# Patient Record
Sex: FEMALE | Race: BLACK OR AFRICAN AMERICAN | NOT HISPANIC OR LATINO | Employment: OTHER | URBAN - METROPOLITAN AREA
[De-identification: names, ages, dates, MRNs, and addresses within clinical notes are randomized per-mention and may not be internally consistent; named-entity substitution may affect disease eponyms.]

---

## 2023-03-31 ENCOUNTER — HOSPITAL ENCOUNTER (OUTPATIENT)
Dept: DATA CONVERSION | Facility: HOSPITAL | Age: 53
End: 2023-03-31
Attending: PEDIATRICS | Admitting: PEDIATRICS
Payer: COMMERCIAL

## 2023-03-31 DIAGNOSIS — G47.33 OBSTRUCTIVE SLEEP APNEA (ADULT) (PEDIATRIC): ICD-10-CM

## 2023-03-31 DIAGNOSIS — Z86.718 PERSONAL HISTORY OF OTHER VENOUS THROMBOSIS AND EMBOLISM: ICD-10-CM

## 2023-03-31 DIAGNOSIS — D57.20 SICKLE-CELL/HB-C DISEASE WITHOUT CRISIS (MULTI): ICD-10-CM

## 2023-04-17 ENCOUNTER — HOSPITAL ENCOUNTER (OUTPATIENT)
Dept: DATA CONVERSION | Facility: HOSPITAL | Age: 53
End: 2023-04-17
Attending: NURSE PRACTITIONER | Admitting: NURSE PRACTITIONER
Payer: COMMERCIAL

## 2023-04-17 DIAGNOSIS — I87.1 COMPRESSION OF VEIN: ICD-10-CM

## 2023-05-05 ENCOUNTER — HOSPITAL ENCOUNTER (OUTPATIENT)
Dept: DATA CONVERSION | Facility: HOSPITAL | Age: 53
End: 2023-05-05
Attending: PEDIATRICS
Payer: COMMERCIAL

## 2023-05-05 DIAGNOSIS — Z53.8 PROCEDURE AND TREATMENT NOT CARRIED OUT FOR OTHER REASONS: ICD-10-CM

## 2023-05-05 DIAGNOSIS — D57.1 SICKLE-CELL DISEASE WITHOUT CRISIS (MULTI): ICD-10-CM

## 2023-06-09 ENCOUNTER — HOSPITAL ENCOUNTER (OUTPATIENT)
Dept: DATA CONVERSION | Facility: HOSPITAL | Age: 53
End: 2023-06-09
Attending: PEDIATRICS | Admitting: PEDIATRICS
Payer: COMMERCIAL

## 2023-06-09 DIAGNOSIS — Z86.718 PERSONAL HISTORY OF OTHER VENOUS THROMBOSIS AND EMBOLISM: ICD-10-CM

## 2023-06-09 DIAGNOSIS — D57.1 SICKLE-CELL DISEASE WITHOUT CRISIS (MULTI): ICD-10-CM

## 2023-06-09 DIAGNOSIS — G47.33 OBSTRUCTIVE SLEEP APNEA (ADULT) (PEDIATRIC): ICD-10-CM

## 2023-06-09 DIAGNOSIS — Z79.01 LONG TERM (CURRENT) USE OF ANTICOAGULANTS: ICD-10-CM

## 2023-07-28 ENCOUNTER — PATIENT OUTREACH (OUTPATIENT)
Dept: CARE COORDINATION | Facility: CLINIC | Age: 53
End: 2023-07-28
Payer: COMMERCIAL

## 2023-08-12 ENCOUNTER — HOSPITAL ENCOUNTER (OUTPATIENT)
Dept: DATA CONVERSION | Facility: HOSPITAL | Age: 53
Discharge: HOME | End: 2023-08-12

## 2023-08-12 DIAGNOSIS — R10.30 LOWER ABDOMINAL PAIN, UNSPECIFIED: ICD-10-CM

## 2023-08-12 DIAGNOSIS — R07.89 OTHER CHEST PAIN: ICD-10-CM

## 2023-08-12 DIAGNOSIS — M79.601 PAIN IN RIGHT ARM: ICD-10-CM

## 2023-08-12 DIAGNOSIS — M79.604 PAIN IN RIGHT LEG: ICD-10-CM

## 2023-08-12 DIAGNOSIS — M25.522 PAIN IN LEFT ELBOW: ICD-10-CM

## 2023-08-12 DIAGNOSIS — M54.50 LOW BACK PAIN, UNSPECIFIED: ICD-10-CM

## 2023-08-12 DIAGNOSIS — M54.2 CERVICALGIA: ICD-10-CM

## 2023-08-12 DIAGNOSIS — Z79.01 LONG TERM (CURRENT) USE OF ANTICOAGULANTS: ICD-10-CM
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
## 2023-08-12 DIAGNOSIS — Z20.822 CONTACT WITH AND (SUSPECTED) EXPOSURE TO COVID-19: ICD-10-CM

## 2023-08-12 DIAGNOSIS — M25.551 PAIN IN RIGHT HIP: ICD-10-CM

## 2023-08-12 DIAGNOSIS — D57.1 SICKLE-CELL DISEASE WITHOUT CRISIS (MULTI): ICD-10-CM

## 2023-08-12 DIAGNOSIS — M25.562 PAIN IN LEFT KNEE: ICD-10-CM

## 2023-08-12 LAB
ABO + RH BLD: NORMAL
ANION GAP SERPL CALCULATED.3IONS-SCNC: 9 MMOL/L (ref 0–19)
ANISOCYTOSIS BLD QL SMEAR: ABNORMAL
ANTICOAGULANT: ABNORMAL
APAP SERPL-MCNC: 5 UG/ML (ref 15–30)
BASOPHILS # BLD AUTO: 0.03 K/UL (ref 0–0.22)
BASOPHILS NFR BLD AUTO: 0.2 % (ref 0–1)
BLD GP AB SCN SERPL QL: NEGATIVE
BLD PROD TYP BPU: NORMAL
BUN SERPL-MCNC: 16 MG/DL (ref 8–25)
BUN/CREAT SERPL: 8.4 RATIO (ref 8–21)
CALCIUM SERPL-MCNC: 8.7 MG/DL (ref 8.5–10.4)
CHLORIDE SERPL-SCNC: 101 MMOL/L (ref 97–107)
CO2 SERPL-SCNC: 25 MMOL/L (ref 24–31)
CREAT SERPL-MCNC: 1.9 MG/DL (ref 0.4–1.6)
DEPRECATED RDW RBC AUTO: 76.5 FL (ref 37–54)
DIFFERENTIAL METHOD BLD: ABNORMAL
EOSINOPHIL # BLD AUTO: 0.14 K/UL (ref 0–0.45)
EOSINOPHIL NFR BLD: 0.9 % (ref 0–3)
ERYTHROCYTE [DISTWIDTH] IN BLOOD BY AUTOMATED COUNT: 29.7 % (ref 11.7–15)
ETHANOL SERPL-MCNC: <0.01 GM/DL (ref 0–0.01)
EUA DISCLAIMER: NORMAL
FLUAV RNA NPH QL NAA+PROBE: NEGATIVE
FLUBV RNA NPH QL NAA+PROBE: NEGATIVE
GFR SERPL CREATININE-BSD FRML MDRD: 31 ML/MIN/1.73 M2
GLUCOSE BLD STRIP.AUTO-MCNC: 83 MG/DL (ref 65–99)
GLUCOSE SERPL-MCNC: 83 MG/DL (ref 65–99)
HCT VFR BLD AUTO: 30.4 % (ref 36–44)
HGB BLD-MCNC: 9.8 GM/DL (ref 12–15)
HS TROPONIN T DELTA: NORMAL (ref 0–4)
HX OF BLOOD TRANSFUSION: NORMAL
IMM GRANULOCYTES # BLD AUTO: 0.12 K/UL (ref 0–0.1)
INR PPP: 1.3 (ref 0.86–1.16)
LACTATE BLDV-SCNC: 1.3 MMOL/L (ref 0.4–2)
LITHIUM SERPL-SCNC: 0.1 MMOL/L (ref 0.6–1.2)
LYMPHOCYTES # BLD AUTO: 1.68 K/UL (ref 1.2–3.2)
LYMPHOCYTES NFR BLD MANUAL: 10.7 % (ref 20–40)
MCH RBC QN AUTO: 23.7 PG (ref 26–34)
MCHC RBC AUTO-ENTMCNC: 32.2 % (ref 31–37)
MCV RBC AUTO: 73.6 FL (ref 80–100)
MICROCYTES BLD QL SMEAR: ABNORMAL
MONOCYTES # BLD AUTO: 1.04 K/UL (ref 0–0.8)
MONOCYTES NFR BLD MANUAL: 6.6 % (ref 0–8)
NEUTROPHILS # BLD AUTO: 12.64 K/UL
NEUTROPHILS # BLD AUTO: 12.64 K/UL (ref 1.8–7.7)
NEUTROPHILS.IMMATURE NFR BLD: 0.8 % (ref 0–1)
NEUTS SEG NFR BLD: 80.8 % (ref 50–70)
NRBC BLD-RTO: 5 /100 WBC
NUM BPU REQUESTED: 0
OSMOL GAP SERPL: 285 MOS/KG (ref 285–295)
PLATELET # BLD AUTO: 320 K/UL (ref 150–450)
PLATELET BLD QL SMEAR: ABNORMAL
PMV BLD AUTO: 9.4 CU (ref 7–12.6)
POLYCHROMASIA BLD QL SMEAR: ABNORMAL
POTASSIUM SERPL-SCNC: 4.2 MMOL/L (ref 3.4–5.1)
PROTHROMBIN TIME: 13.6 SEC (ref 9.3–12.7)
RBC # BLD AUTO: 4.13 M/UL (ref 4–4.9)
RBC MORPH BLD: ABNORMAL
SALICYLATES SERPL-MCNC: 0.3 MG/DL (ref 3–25)
SARS-COV-2 RNA SPEC QL NAA+PROBE: NEGATIVE
SODIUM SERPL-SCNC: 134 MMOL/L (ref 133–145)
SPECIMEN EXP DATE BLD: NORMAL
TARGETS BLD QL SMEAR: ABNORMAL
TEST ORDERED: NORMAL
TRANSF BAND NUM PATIENT: NORMAL
TROPONIN T SERPL-MCNC: 9 NG/L
WBC # BLD AUTO: 15.7 K/UL (ref 4.5–11)
WBC MORPH BLD: ABNORMAL

## 2023-08-14 ENCOUNTER — PATIENT OUTREACH (OUTPATIENT)
Dept: CARE COORDINATION | Facility: CLINIC | Age: 53
End: 2023-08-14
Payer: COMMERCIAL

## 2023-08-14 NOTE — PROGRESS NOTES
Outreach call to patient to support a smooth transition of care from recent admission.  Unable to leave a voicemail message for patient with my contact information.    JARETT VuongN, RN

## 2023-09-07 VITALS — BODY MASS INDEX: 41.51 KG/M2 | HEIGHT: 65 IN | WEIGHT: 249.12 LBS

## 2023-09-10 PROBLEM — J96.20 ACUTE ON CHRONIC RESPIRATORY FAILURE (MULTI): Status: ACTIVE | Noted: 2023-09-10

## 2023-09-10 PROBLEM — F33.9 MAJOR DEPRESSION, RECURRENT, CHRONIC (CMS-HCC): Status: ACTIVE | Noted: 2023-09-10

## 2023-09-10 PROBLEM — M19.019 OSTEOARTHRITIS OF SHOULDER: Status: ACTIVE | Noted: 2023-09-10

## 2023-09-10 PROBLEM — R55 VASOVAGAL EPISODE: Status: ACTIVE | Noted: 2023-09-10

## 2023-09-10 PROBLEM — S82.891A ANKLE FRACTURE, RIGHT: Status: ACTIVE | Noted: 2023-09-10

## 2023-09-10 PROBLEM — B37.0 ORAL THRUSH: Status: ACTIVE | Noted: 2023-09-10

## 2023-09-10 PROBLEM — H52.7 REFRACTIVE ERROR: Status: ACTIVE | Noted: 2023-09-10

## 2023-09-10 PROBLEM — M25.569 PAIN IN JOINT, LOWER LEG: Status: ACTIVE | Noted: 2023-09-10

## 2023-09-10 PROBLEM — R53.81 DEBILITY: Status: ACTIVE | Noted: 2023-09-10

## 2023-09-10 PROBLEM — I27.20 PULMONARY HYPERTENSION (MULTI): Status: ACTIVE | Noted: 2023-09-10

## 2023-09-10 PROBLEM — L85.1 ACQUIRED PLANTAR POROKERATOSIS: Status: ACTIVE | Noted: 2023-09-10

## 2023-09-10 PROBLEM — H52.13 BILATERAL MYOPIA: Status: ACTIVE | Noted: 2023-09-10

## 2023-09-10 PROBLEM — R22.1 NECK SWELLING: Status: ACTIVE | Noted: 2023-09-10

## 2023-09-10 PROBLEM — I11.9 HYPERTENSIVE HEART DISEASE WITHOUT HEART FAILURE: Status: ACTIVE | Noted: 2023-09-10

## 2023-09-10 PROBLEM — R79.89 ELEVATED TROPONIN I LEVEL: Status: ACTIVE | Noted: 2023-09-10

## 2023-09-10 PROBLEM — D57.00: Status: ACTIVE | Noted: 2023-09-10

## 2023-09-10 PROBLEM — D64.9 TRANSFUSION-DEPENDENT ANEMIA: Status: ACTIVE | Noted: 2023-09-10

## 2023-09-10 PROBLEM — D64.9 ANEMIA: Status: ACTIVE | Noted: 2023-09-10

## 2023-09-10 PROBLEM — E66.01 MORBID OBESITY WITH BMI OF 40.0-44.9, ADULT (MULTI): Status: ACTIVE | Noted: 2023-09-10

## 2023-09-10 PROBLEM — I47.10 PAROXYSMAL SUPRAVENTRICULAR TACHYCARDIA (CMS-HCC): Status: ACTIVE | Noted: 2023-09-10

## 2023-09-10 PROBLEM — R09.02 HYPOXEMIA: Status: ACTIVE | Noted: 2023-09-10

## 2023-09-10 PROBLEM — I87.1 SVC SYNDROME: Status: ACTIVE | Noted: 2023-09-10

## 2023-09-10 PROBLEM — R40.20 LOC (LOSS OF CONSCIOUSNESS) (MULTI): Status: ACTIVE | Noted: 2023-09-10

## 2023-09-10 PROBLEM — R41.82 ALTERED MENTAL STATUS: Status: ACTIVE | Noted: 2023-09-10

## 2023-09-10 PROBLEM — R71.8 RETICULOCYTOPENIA: Status: ACTIVE | Noted: 2023-09-10

## 2023-09-10 PROBLEM — R52 GENERALIZED PAIN: Status: ACTIVE | Noted: 2023-09-10

## 2023-09-10 PROBLEM — S83.411A MCL SPRAIN OF RIGHT KNEE: Status: ACTIVE | Noted: 2023-09-10

## 2023-09-10 PROBLEM — K76.9 HEPATOPATHY: Status: ACTIVE | Noted: 2023-09-10

## 2023-09-10 PROBLEM — J18.9 PNEUMONIA: Status: ACTIVE | Noted: 2023-09-10

## 2023-09-10 PROBLEM — Z60.9 HIGH RISK SOCIAL SITUATION: Status: ACTIVE | Noted: 2023-09-10

## 2023-09-10 PROBLEM — D50.9 IRON DEFICIENCY ANEMIA: Status: ACTIVE | Noted: 2023-09-10

## 2023-09-10 PROBLEM — R94.2 DECREASED FUNCTIONAL RESIDUAL CAPACITY: Status: ACTIVE | Noted: 2023-09-10

## 2023-09-10 PROBLEM — R79.89 ABNORMAL LIVER FUNCTION TESTS: Status: ACTIVE | Noted: 2023-09-10

## 2023-09-10 PROBLEM — R74.8 HIGH LEVEL OF CARDIAC MARKER: Status: ACTIVE | Noted: 2023-09-10

## 2023-09-10 PROBLEM — R93.1 ABNORMAL ECHOCARDIOGRAM: Status: ACTIVE | Noted: 2023-09-10

## 2023-09-10 PROBLEM — M16.11 PRIMARY LOCALIZED OSTEOARTHRITIS OF RIGHT HIP: Status: ACTIVE | Noted: 2023-09-10

## 2023-09-10 PROBLEM — L68.0 HIRSUTISM: Status: ACTIVE | Noted: 2023-09-10

## 2023-09-10 PROBLEM — S83.206A TEAR OF MENISCUS OF RIGHT KNEE: Status: ACTIVE | Noted: 2023-09-10

## 2023-09-10 PROBLEM — R22.0 SWELLING OF FACE: Status: ACTIVE | Noted: 2023-09-10

## 2023-09-10 PROBLEM — R93.1 ABNORMAL FINDINGS ON CARDIAC CATHETERIZATION: Status: ACTIVE | Noted: 2023-09-10

## 2023-09-10 PROBLEM — R94.30 ELEVATED LEFT VENTRICULAR END-DIASTOLIC PRESSURE (LVEDP): Status: ACTIVE | Noted: 2023-09-10

## 2023-09-10 PROBLEM — I82.210: Status: ACTIVE | Noted: 2023-09-10

## 2023-09-10 PROBLEM — R45.2 DEPRESSED MOOD WITH FEELING OF LONELINESS: Status: ACTIVE | Noted: 2023-09-10

## 2023-09-10 PROBLEM — D57.00 HEMOGLOBIN SS DISEASE WITH CRISIS (MULTI): Status: ACTIVE | Noted: 2023-09-10

## 2023-09-10 PROBLEM — J98.11 ATELECTASIS OF LEFT LUNG: Status: ACTIVE | Noted: 2023-09-10

## 2023-09-10 PROBLEM — M17.11 ARTHRITIS OF KNEE, RIGHT: Status: ACTIVE | Noted: 2023-09-10

## 2023-09-10 PROBLEM — I95.9 HYPOTENSION, UNSPECIFIED: Status: ACTIVE | Noted: 2020-12-10

## 2023-09-10 PROBLEM — I26.99 PULMONARY EMBOLISM (MULTI): Status: ACTIVE | Noted: 2023-09-10

## 2023-09-10 PROBLEM — M87.059 ASEPTIC NECROSIS OF FEMORAL HEAD (MULTI): Status: ACTIVE | Noted: 2023-09-10

## 2023-09-10 PROBLEM — H33.8 OLD RETINAL DETACHMENT, PARTIAL: Status: ACTIVE | Noted: 2023-09-10

## 2023-09-10 PROBLEM — K75.9 INFLAMMATORY LIVER DISEASE: Status: ACTIVE | Noted: 2023-09-10

## 2023-09-10 PROBLEM — Z87.891 EX-CIGARETTE SMOKER: Status: ACTIVE | Noted: 2023-09-10

## 2023-09-10 PROBLEM — N18.2 CHRONIC KIDNEY DISEASE, STAGE II (MILD): Status: ACTIVE | Noted: 2023-09-10

## 2023-09-10 PROBLEM — R07.9 CHEST PAIN: Status: ACTIVE | Noted: 2023-09-10
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
## 2023-09-10 PROBLEM — R55 SYNCOPE: Status: ACTIVE | Noted: 2023-09-10

## 2023-09-10 PROBLEM — I82.409 DVT (DEEP VENOUS THROMBOSIS) (MULTI): Status: ACTIVE | Noted: 2023-09-10

## 2023-09-10 PROBLEM — R70.1 RETICULOCYTOSIS: Status: ACTIVE | Noted: 2023-09-10

## 2023-09-10 PROBLEM — N17.0 ACUTE TUBULAR NECROSIS (CMS-HCC): Status: ACTIVE | Noted: 2023-09-10

## 2023-09-10 PROBLEM — G47.33 OSA ON CPAP: Status: ACTIVE | Noted: 2023-09-10

## 2023-09-10 PROBLEM — R07.89 CHEST PAIN, MUSCULOSKELETAL: Status: ACTIVE | Noted: 2023-09-10

## 2023-09-10 PROBLEM — M79.89 SWELLING OF UPPER EXTREMITY: Status: ACTIVE | Noted: 2023-09-10

## 2023-09-10 PROBLEM — I42.9 CARDIOMYOPATHY (MULTI): Status: ACTIVE | Noted: 2023-09-10

## 2023-09-10 PROBLEM — D57.00 VASO-OCCLUSIVE SICKLE CELL CRISIS (MULTI): Status: ACTIVE | Noted: 2023-09-10

## 2023-09-10 PROBLEM — G83.4 CAUDA EQUINA SYNDROME (MULTI): Status: ACTIVE | Noted: 2023-09-10

## 2023-09-10 PROBLEM — D57.01: Status: ACTIVE | Noted: 2023-09-10

## 2023-09-10 PROBLEM — I21.4 ACUTE NON-ST ELEVATION MYOCARDIAL INFARCTION (NSTEMI) (MULTI): Status: ACTIVE | Noted: 2023-09-10

## 2023-09-10 PROBLEM — R06.02 SHORTNESS OF BREATH: Status: ACTIVE | Noted: 2020-01-13

## 2023-09-10 PROBLEM — N17.9 ACUTE KIDNEY FAILURE (CMS-HCC): Status: ACTIVE | Noted: 2023-09-10

## 2023-09-10 PROBLEM — I49.3 PREMATURE VENTRICULAR CONTRACTIONS: Status: ACTIVE | Noted: 2023-09-10

## 2023-09-10 PROBLEM — K76.3: Status: ACTIVE | Noted: 2023-09-10

## 2023-09-10 PROBLEM — H52.4 BILATERAL PRESBYOPIA: Status: ACTIVE | Noted: 2023-09-10

## 2023-09-10 PROBLEM — D72.829 LEUKOCYTOSIS: Status: ACTIVE | Noted: 2023-09-10

## 2023-09-10 PROBLEM — K59.03 CONSTIPATION DUE TO OPIOID THERAPY: Status: ACTIVE | Noted: 2023-09-10

## 2023-09-10 PROBLEM — N91.5 OLIGOMENORRHEA: Status: ACTIVE | Noted: 2023-09-10

## 2023-09-10 PROBLEM — E87.29 RESPIRATORY ACIDOSIS: Status: ACTIVE | Noted: 2023-09-10

## 2023-09-10 PROBLEM — H52.10 MYOPIA: Status: ACTIVE | Noted: 2023-09-10

## 2023-09-10 PROBLEM — M54.10 RADICULOPATHY: Status: ACTIVE | Noted: 2023-09-10

## 2023-09-10 PROBLEM — M87.00 AVN (AVASCULAR NECROSIS OF BONE) (MULTI): Status: ACTIVE | Noted: 2023-09-10

## 2023-09-10 PROBLEM — H35.89 RETINAL MACULAR ATROPHY: Status: ACTIVE | Noted: 2023-09-10

## 2023-09-10 PROBLEM — H52.209 ASTIGMATISM: Status: ACTIVE | Noted: 2023-09-10

## 2023-09-10 PROBLEM — R76.8 SS-A ANTIBODY POSITIVE: Status: ACTIVE | Noted: 2023-09-10

## 2023-09-10 PROBLEM — N17.9 AKI (ACUTE KIDNEY INJURY) (CMS-HCC): Status: ACTIVE | Noted: 2023-09-10

## 2023-09-10 PROBLEM — R19.7 DIARRHEA: Status: ACTIVE | Noted: 2023-09-10

## 2023-09-10 PROBLEM — I82.401: Status: ACTIVE | Noted: 2023-09-10

## 2023-09-10 PROBLEM — N39.41 URGE INCONTINENCE: Status: ACTIVE | Noted: 2023-09-10

## 2023-09-10 PROBLEM — T40.2X5A CONSTIPATION DUE TO OPIOID THERAPY: Status: ACTIVE | Noted: 2023-09-10

## 2023-09-10 PROBLEM — R42 VERTIGO: Status: ACTIVE | Noted: 2023-09-10

## 2023-09-10 PROBLEM — R26.9 ABNORMAL GAIT: Status: ACTIVE | Noted: 2023-09-10

## 2023-09-10 PROBLEM — N83.299 FUNCTIONAL OVARIAN CYSTS: Status: ACTIVE | Noted: 2023-09-10

## 2023-09-10 PROBLEM — S93.431A ANKLE SYNDESMOSIS DISRUPTION, RIGHT, INITIAL ENCOUNTER: Status: ACTIVE | Noted: 2023-09-10

## 2023-09-10 PROBLEM — R53.83 LETHARGY: Status: ACTIVE | Noted: 2023-09-10

## 2023-09-10 PROBLEM — R92.8 ABNORMAL FINDING ON BREAST IMAGING: Status: ACTIVE | Noted: 2023-09-10

## 2023-09-10 PROBLEM — G89.29 CHRONIC PAIN: Status: ACTIVE | Noted: 2023-09-10

## 2023-09-10 PROBLEM — M79.671 RIGHT FOOT PAIN: Status: ACTIVE | Noted: 2023-09-10

## 2023-09-10 PROBLEM — H43.9 VITREOUS DEBRIS: Status: ACTIVE | Noted: 2023-09-10

## 2023-09-10 PROBLEM — H33.21 RETINAL DETACHMENT, RIGHT: Status: ACTIVE | Noted: 2023-09-10

## 2023-09-10 PROBLEM — T82.868A: Status: ACTIVE | Noted: 2023-09-10

## 2023-09-10 PROBLEM — R45.89 DEPRESSED MOOD WITH FEELING OF LONELINESS: Status: ACTIVE | Noted: 2023-09-10

## 2023-09-10 PROBLEM — R32 INCONTINENCE: Status: ACTIVE | Noted: 2023-09-10

## 2023-09-10 PROBLEM — J81.1 PULMONARY EDEMA (HHS-HCC): Status: ACTIVE | Noted: 2023-09-10

## 2023-09-10 PROBLEM — R29.898 LIMB WEAKNESS: Status: ACTIVE | Noted: 2023-09-10

## 2023-09-10 PROBLEM — M25.559 HIP PAIN: Status: ACTIVE | Noted: 2023-09-10

## 2023-09-10 PROBLEM — M17.9 OSTEOARTHRITIS OF KNEE: Status: ACTIVE | Noted: 2023-09-10

## 2023-09-10 PROBLEM — S82.61XA CLOSED DISPLACED FRACTURE OF LATERAL MALLEOLUS OF RIGHT FIBULA: Status: ACTIVE | Noted: 2023-09-10

## 2023-09-10 PROBLEM — Z86.69 HISTORY OF RETINAL DETACHMENT: Status: ACTIVE | Noted: 2023-09-10
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
## 2023-09-10 PROBLEM — Z86.79 H/O SUPRAVENTRICULAR TACHYCARDIA: Status: ACTIVE | Noted: 2023-09-10

## 2023-09-10 PROBLEM — S80.01XA CONTUSION OF KNEE, RIGHT: Status: ACTIVE | Noted: 2023-09-10

## 2023-09-10 RX ORDER — METOPROLOL SUCCINATE 25 MG/1
25 TABLET, EXTENDED RELEASE ORAL 2 TIMES DAILY
COMMUNITY
End: 2023-10-13 | Stop reason: ALTCHOICE

## 2023-09-10 RX ORDER — BENZONATATE 200 MG/1
200 CAPSULE ORAL 3 TIMES DAILY
COMMUNITY
Start: 2023-06-29 | End: 2023-10-02 | Stop reason: ALTCHOICE

## 2023-09-10 RX ORDER — TIZANIDINE 2 MG/1
4 TABLET ORAL EVERY 8 HOURS
COMMUNITY
Start: 2022-09-14 | End: 2023-10-02 | Stop reason: ALTCHOICE

## 2023-09-10 RX ORDER — FUROSEMIDE 20 MG/1
20 TABLET ORAL EVERY OTHER DAY
Status: ON HOLD | COMMUNITY
Start: 2023-07-28

## 2023-09-10 RX ORDER — OXYCODONE HYDROCHLORIDE 10 MG/1
10 TABLET ORAL EVERY 4 HOURS PRN
COMMUNITY
End: 2023-10-02 | Stop reason: SDUPTHER

## 2023-09-10 RX ORDER — ALBUTEROL SULFATE 90 UG/1
2 AEROSOL, METERED RESPIRATORY (INHALATION) EVERY 4 HOURS PRN
COMMUNITY
End: 2024-02-13 | Stop reason: SDUPTHER

## 2023-09-10 RX ORDER — SIMVASTATIN 10 MG/1
1 TABLET, FILM COATED ORAL EVERY EVENING
COMMUNITY
Start: 2017-04-19 | End: 2023-10-02 | Stop reason: ALTCHOICE

## 2023-09-10 RX ORDER — GABAPENTIN 100 MG/1
CAPSULE ORAL 3 TIMES DAILY PRN
COMMUNITY
End: 2023-10-02 | Stop reason: ALTCHOICE

## 2023-09-10 RX ORDER — TRAZODONE HYDROCHLORIDE 50 MG/1
TABLET ORAL NIGHTLY
COMMUNITY
End: 2023-11-08 | Stop reason: SDUPTHER

## 2023-09-10 RX ORDER — WARFARIN SODIUM 5 MG/1
5 TABLET ORAL EVERY EVENING
Status: ON HOLD | COMMUNITY
End: 2024-03-03 | Stop reason: SDUPTHER

## 2023-09-10 RX ORDER — DULOXETIN HYDROCHLORIDE 20 MG/1
20 CAPSULE, DELAYED RELEASE ORAL DAILY
COMMUNITY
Start: 2023-06-07 | End: 2023-12-06 | Stop reason: DRUGHIGH

## 2023-09-10 RX ORDER — LORATADINE 10 MG/1
1 TABLET ORAL DAILY PRN
Status: ON HOLD | COMMUNITY
Start: 2023-01-04

## 2023-09-10 RX ORDER — BENZONATATE 100 MG/1
1 CAPSULE ORAL 2 TIMES DAILY
COMMUNITY
Start: 2023-02-22 | End: 2023-10-02 | Stop reason: ALTCHOICE

## 2023-09-10 RX ORDER — VENLAFAXINE HYDROCHLORIDE 75 MG/1
75 CAPSULE, EXTENDED RELEASE ORAL DAILY
COMMUNITY
Start: 2022-07-27 | End: 2023-10-02 | Stop reason: ALTCHOICE

## 2023-09-10 RX ORDER — FOLIC ACID 1 MG/1
1 TABLET ORAL DAILY
Status: ON HOLD | COMMUNITY

## 2023-09-10 RX ORDER — MOMETASONE FUROATE 50 UG/1
1 SPRAY, METERED NASAL DAILY PRN
COMMUNITY
Start: 2022-06-01 | End: 2023-12-11 | Stop reason: HOSPADM

## 2023-09-10 RX ORDER — DOCUSATE SODIUM 100 MG/1
100 CAPSULE, LIQUID FILLED ORAL 2 TIMES DAILY PRN
COMMUNITY
Start: 2016-04-10 | End: 2023-12-07 | Stop reason: ALTCHOICE

## 2023-09-10 RX ORDER — METOPROLOL TARTRATE 25 MG/1
25 TABLET, FILM COATED ORAL 2 TIMES DAILY
COMMUNITY
Start: 2023-07-18 | End: 2024-01-24 | Stop reason: HOSPADM

## 2023-09-10 RX ORDER — WARFARIN 7.5 MG/1
7.5 TABLET ORAL
COMMUNITY
Start: 2022-11-16 | End: 2023-10-02 | Stop reason: ALTCHOICE

## 2023-09-10 RX ORDER — ONDANSETRON 4 MG/1
4 TABLET, FILM COATED ORAL EVERY 4 HOURS PRN
COMMUNITY
End: 2023-11-08 | Stop reason: SDUPTHER

## 2023-09-18 LAB — TROPONIN I, HIGH SENSITIVITY: <3 NG/L (ref 0–34)

## 2023-09-29 DIAGNOSIS — I26.99 RECURRENT PULMONARY EMBOLISM (MULTI): Primary | ICD-10-CM

## 2023-09-29 DIAGNOSIS — I82.210 THROMBOSIS OF SUPERIOR VENA CAVA (MULTI): ICD-10-CM

## 2023-09-29 LAB
INR IN PPP BY COAGULATION ASSAY EXTERNAL: 2.4
PROTHROMBIN TIME (PT) IN PPP BY COAGULATION ASSAY EXTERNAL: NORMAL SECONDS

## 2023-10-02 ENCOUNTER — OFFICE VISIT (OUTPATIENT)
Dept: HEMATOLOGY/ONCOLOGY | Facility: HOSPITAL | Age: 53
End: 2023-10-02
Payer: COMMERCIAL

## 2023-10-02 VITALS
HEART RATE: 86 BPM | SYSTOLIC BLOOD PRESSURE: 117 MMHG | OXYGEN SATURATION: 98 % | WEIGHT: 238.1 LBS | RESPIRATION RATE: 18 BRPM | DIASTOLIC BLOOD PRESSURE: 59 MMHG | TEMPERATURE: 97.9 F | BODY MASS INDEX: 39.62 KG/M2

## 2023-10-02 DIAGNOSIS — D57.00 SICKLE CELL CRISIS (MULTI): Primary | ICD-10-CM

## 2023-10-02 DIAGNOSIS — D57.1 SICKLE-CELL DISEASE WITHOUT CRISIS (MULTI): ICD-10-CM

## 2023-10-02 PROBLEM — E87.29 RESPIRATORY ACIDOSIS: Status: RESOLVED | Noted: 2023-09-10 | Resolved: 2023-10-02

## 2023-10-02 PROBLEM — R19.7 DIARRHEA: Status: RESOLVED | Noted: 2023-09-10 | Resolved: 2023-10-02

## 2023-10-02 PROBLEM — R07.9 CHEST PAIN: Status: RESOLVED | Noted: 2023-09-10 | Resolved: 2023-10-02

## 2023-10-02 PROBLEM — I95.9 HYPOTENSION, UNSPECIFIED: Status: RESOLVED | Noted: 2020-12-10 | Resolved: 2023-10-02

## 2023-10-02 PROBLEM — R79.89 ABNORMAL LIVER FUNCTION TESTS: Status: RESOLVED | Noted: 2023-09-10 | Resolved: 2023-10-02

## 2023-10-02 PROBLEM — R71.8 RETICULOCYTOPENIA: Status: RESOLVED | Noted: 2023-09-10 | Resolved: 2023-10-02

## 2023-10-02 PROBLEM — B37.0 ORAL THRUSH: Status: RESOLVED | Noted: 2023-09-10 | Resolved: 2023-10-02

## 2023-10-02 PROBLEM — N91.5 OLIGOMENORRHEA: Status: RESOLVED | Noted: 2023-09-10 | Resolved: 2023-10-02

## 2023-10-02 PROCEDURE — 1036F TOBACCO NON-USER: CPT | Performed by: PEDIATRICS

## 2023-10-02 PROCEDURE — 99215 OFFICE O/P EST HI 40 MIN: CPT | Performed by: PEDIATRICS

## 2023-10-02 PROCEDURE — 3008F BODY MASS INDEX DOCD: CPT | Performed by: PEDIATRICS

## 2023-10-02 RX ORDER — OXYCODONE HYDROCHLORIDE 10 MG/1
10 TABLET ORAL EVERY 4 HOURS PRN
Qty: 75 TABLET | Refills: 0 | Status: SHIPPED | OUTPATIENT
Start: 2023-10-02 | End: 2023-10-23 | Stop reason: SDUPTHER

## 2023-10-02 ASSESSMENT — ENCOUNTER SYMPTOMS
NAUSEA: 0
DIARRHEA: 0
BRUISES/BLEEDS EASILY: 0
HEMATURIA: 0
WHEEZING: 0
APPETITE CHANGE: 0
PALPITATIONS: 0
TROUBLE SWALLOWING: 0
CONSTIPATION: 0
SHORTNESS OF BREATH: 0
FATIGUE: 1
VOMITING: 0
UNEXPECTED WEIGHT CHANGE: 0
BACK PAIN: 1
ARTHRALGIAS: 1
HEADACHES: 0
LIGHT-HEADEDNESS: 0
DIZZINESS: 0
BLOOD IN STOOL: 0
ABDOMINAL PAIN: 0
WOUND: 0
DYSURIA: 0
CHEST TIGHTNESS: 0
FEVER: 0
ADENOPATHY: 0
CHILLS: 0
COUGH: 0

## 2023-10-02 ASSESSMENT — PAIN SCALES - GENERAL: PAINLEVEL: 7

## 2023-10-02 NOTE — PROGRESS NOTES
Primary Care Provider: Eric Wei MD  Visit Type: Follow Up    Assessment/Plan    Senait Narvaez is a 53 year old with   1. History of Hb SC SCD and chronic pain. She presents with acute on chronic pain which is generalized and rates it at 7/10.   - oral tylenol as needed for mild to moderate pain   - oral oxycodone 10-20 mg every 4-6 hours as needed for moderate to severe and breakthrough pain. Dispensed 75 tablets   - oral duloxetine for chronic pain and also anxiety/depression   - reviewed [facility] and there were no abnormal or concerning opioid prescriptions.   - discussed non pharmacologic methods of pain control     2. Encounter for DMT with full rbc exchange every 5-6 weeks, with indication being MSOF and chronic pain. She last received this on 9/21/23 and next due on 10/25/23. Senait has had increased pain for a day or 2 post exchange transfusion which is unusual for her. She has very poor venous access and using femoral veins for apheresis   - proceed with planned full exchange as scheduled later this month. This will continue to be performed under telemetry on account of recurrent arrhythmia   - IR to place central line    3. History of recurrent VTE/PE with SVC syndrome, on life long anticoagulation with warfarin   - continue oral warfarin 5 mg daily with target INR of 2-3     4.  History of bronchial asthma, without recent exacerbation   - as needed albuterol   - continue inhaled nasal steroids     5. History of insomnia   - continue oral trazodone     6. History of CAN on CPAP with 2L of supplemental oxygen    7. Chronic anemia secondary to SCD    - continue daily folic acid     8. Chronic constipation   - colase and miralax as needed     9. History of tachycardia and arrhythmia/SVT, well controlled   - metoprolol as prescribed   - follow up with cardiology as scheduled on 10/13    10. CKD   - follow up with nephrology as scheduled on 10/19  - avoid nephrotoxic medications     11. Pulmonary hypertension    - follow up with pulmonology as scheduled  10/17    12. Follow up in 4-5 weeks or sooner if indicated         Subjective    Senait is present with for follow up of SCD. She last had her full rbc exchange visits on 9/2/23.  She is next scheduled for 10/25/23. She is typically admitted for rbc exchange which is performed under telemetry  because of history of recurrent cardiac arrhythmia. She complains about generalized aches and pain which she attributes to her sickle cell pain. Pain is rated at 7/10 and we has been taking 1-2 tablets of her oral oxycodone 10 mg tablets.   Facial and neck swelling are all unchanged from prior. She remains on warfarin as anticoagulation for recurrent VTE. She is tolerating this without increased bruising or bleeding. INR has been therapeutic between 2-3. She denies palpitations, chest pain or chest tightness SOB, headaches, dizziness, nausea or vomiting. Bronchial asthma has been well controlled and has not required albuterol. She is afebrile. She is stool ing well with current laxatives. She has been compliant with supplemental oxygen 2L at night and has been compliant. She is sleeping well with prn trazodone.     Review of Systems   Constitutional:  Positive for fatigue. Negative for appetite change, chills, fever and unexpected weight change.   HENT:   Negative for mouth sores, nosebleeds and trouble swallowing.    Respiratory:  Negative for chest tightness, cough, shortness of breath and wheezing.    Cardiovascular:  Negative for chest pain and palpitations.   Gastrointestinal:  Negative for abdominal pain, blood in stool, constipation, diarrhea, nausea and vomiting.   Genitourinary:  Negative for dysuria and hematuria.    Musculoskeletal:  Positive for arthralgias and back pain.   Skin:  Negative for rash and wound.   Neurological:  Negative for dizziness, headaches and light-headedness.   Hematological:  Negative for adenopathy. Does not bruise/bleed easily.      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 Has been evaluated for BMT but donor was considered to be too high risk.   Recurrent DVT/PE with lifelong anticoagulation on coumadin  Cauda equina with saddle numbness, history of bowel/bladder incontinence  Chronic right hip pain, secondary to AVN    History of acute chest syndrome.   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   Obstructive Sleep and significant nocturnal hemoglobin desaturation    Bilateral lower extremity edema, recurrent  SVC syndrome S/P balloon angioplasty of SVC/left brachiocephalic vein, removal of left sided Mediport catheter (1/21/20)  Syncopal episodes   Balloon removed via IR on April 2023.   SVT event with conversion with 1 dose 6mg of adenosine on 5/5/23  Admission June 2023 underwent RHC/LHC on 6/16 with mPA pressure 36, wedge 14, CI 4.2 and her coronary angiogram revealed no angiographic evidence of obstructive CAD. Dx of pulm HTN.     Family History   Problem Relation    Diabetes Father    Gout Father    Sickle cell trait Father    Seizures Sister    Diabetes Other    Uterine cancer Other    Other (congenital blindness) Cousin     Oncology History    No history exists.       Senait Narvaez  has no history on file for tobacco use.  She  has no history on file for alcohol use.  She  has no history on file for drug use.    Physical Exam  Constitutional:       Appearance: She is normal weight.   HENT:      Mouth/Throat:      Mouth: Mucous membranes are moist.   Neck:      Vascular: No carotid bruit.      Comments: Bilateral swelling of the neck from SVC syndrome  Cardiovascular:      Rate and Rhythm: Normal rate and regular rhythm.      Pulses: Normal pulses.      Heart sounds: Murmur heard.   Pulmonary:      Breath sounds: Normal breath sounds. No stridor. No wheezing or rales.   Abdominal:      Palpations: Abdomen is soft.       Tenderness: There is no abdominal tenderness. There is no guarding.   Neurological:      Mental Status: She is alert.   Psychiatric:         Mood and Affect: Mood normal.       LABS    Reviewed all relevant recent labs

## 2023-10-03 ENCOUNTER — PATIENT OUTREACH (OUTPATIENT)
Dept: CARE COORDINATION | Facility: CLINIC | Age: 53
End: 2023-10-03
Payer: COMMERCIAL

## 2023-10-03 NOTE — PROGRESS NOTES
Chart reviewed, CM outreach to patient who states she is doing okay, patient was seen by Dr. Whaley from [facility]co on 10/2 for her sickle cell crisis.  Patient states she is getting prepped for her apheresis treatment soon.  Patient was aware of upcoming appointments with her other providers. Patient stated her and provider discussed her healthcare in detail as of 10/2. Patient had no questions or concerns noted.      JARETT VuongN, RN

## 2023-10-13 ENCOUNTER — OFFICE VISIT (OUTPATIENT)
Dept: CARDIOLOGY | Facility: CLINIC | Age: 53
End: 2023-10-13
Payer: COMMERCIAL

## 2023-10-13 ENCOUNTER — ANTICOAGULATION - WARFARIN VISIT (OUTPATIENT)
Dept: CARDIOLOGY | Facility: CLINIC | Age: 53
End: 2023-10-13

## 2023-10-13 ENCOUNTER — ANTICOAGULATION - WARFARIN VISIT (OUTPATIENT)
Dept: CARDIOLOGY | Facility: CLINIC | Age: 53
End: 2023-10-13
Payer: COMMERCIAL

## 2023-10-13 VITALS
WEIGHT: 237 LBS | HEART RATE: 73 BPM | HEIGHT: 65 IN | OXYGEN SATURATION: 97 % | DIASTOLIC BLOOD PRESSURE: 70 MMHG | BODY MASS INDEX: 39.49 KG/M2 | SYSTOLIC BLOOD PRESSURE: 104 MMHG

## 2023-10-13 DIAGNOSIS — Z86.79 H/O SUPRAVENTRICULAR TACHYCARDIA: ICD-10-CM

## 2023-10-13 DIAGNOSIS — I26.99 RECURRENT PULMONARY EMBOLISM (MULTI): ICD-10-CM

## 2023-10-13 DIAGNOSIS — I82.210 THROMBOSIS OF SUPERIOR VENA CAVA (MULTI): ICD-10-CM

## 2023-10-13 DIAGNOSIS — I27.20 PULMONARY HYPERTENSION (MULTI): ICD-10-CM

## 2023-10-13 DIAGNOSIS — I82.4Y9 DEEP VEIN THROMBOSIS (DVT) OF PROXIMAL LOWER EXTREMITY, UNSPECIFIED CHRONICITY, UNSPECIFIED LATERALITY (MULTI): Primary | ICD-10-CM

## 2023-10-13 DIAGNOSIS — I47.10 PAROXYSMAL SUPRAVENTRICULAR TACHYCARDIA (CMS-HCC): ICD-10-CM

## 2023-10-13 DIAGNOSIS — I21.4 ACUTE NON-ST ELEVATION MYOCARDIAL INFARCTION (NSTEMI) (MULTI): Primary | ICD-10-CM

## 2023-10-13 DIAGNOSIS — I11.9 HYPERTENSIVE HEART DISEASE WITHOUT HEART FAILURE: ICD-10-CM

## 2023-10-13 DIAGNOSIS — I42.8 OTHER CARDIOMYOPATHY (MULTI): ICD-10-CM

## 2023-10-13 LAB
POC INR: 2.5
POC PROTHROMBIN TIME: NORMAL

## 2023-10-13 PROCEDURE — 1036F TOBACCO NON-USER: CPT | Performed by: INTERNAL MEDICINE

## 2023-10-13 PROCEDURE — 93010 ELECTROCARDIOGRAM REPORT: CPT | Performed by: INTERNAL MEDICINE

## 2023-10-13 PROCEDURE — 3008F BODY MASS INDEX DOCD: CPT | Performed by: INTERNAL MEDICINE

## 2023-10-13 PROCEDURE — 85610 PROTHROMBIN TIME: CPT

## 2023-10-13 PROCEDURE — 93005 ELECTROCARDIOGRAM TRACING: CPT | Performed by: INTERNAL MEDICINE

## 2023-10-13 PROCEDURE — 99213 OFFICE O/P EST LOW 20 MIN: CPT | Performed by: INTERNAL MEDICINE

## 2023-10-13 ASSESSMENT — PAIN SCALES - GENERAL: PAINLEVEL: 8

## 2023-10-13 ASSESSMENT — ENCOUNTER SYMPTOMS
DEPRESSION: 0
LOSS OF SENSATION IN FEET: 0
OCCASIONAL FEELINGS OF UNSTEADINESS: 0

## 2023-10-13 NOTE — PROGRESS NOTES
Patient identification verified with 2 identifiers.    Location: [facility] Patient Self-Testing Program [phone]    Referring Physician: Dr. Patti Gross  Enrollment/ Re-enrollment date: 10/18/23   INR Goal: 2.0-3.0  INR monitoring is per [facility] protocol.  Anticoagulation Medication: warfarin  Indication: DVT, PE    Subjective   Bleeding signs/symptoms: No    Bruising: No   Major bleeding event: No  Thrombosis signs/symptoms: No  Thromboembolic event: No  Missed doses: No  Extra doses: No  Medication changes: No  Dietary changes: No  Change in health: No  Change in activity: No  Alcohol: No  Other concerns: No    Upcoming Surgeries:  Does the Patient Have any upcoming surgeries that require interruption in anticoagulation therapy? yes. Patient will be admitted for a blood transfusion on 10/26. She will hold 2 doses prior to procedure. Pt states she will resume warfarin on 10/26, but will not be discharged until 10/31. Scheduled next INR for 11/3.   Does the patient require bridging? no      Anticoagulation Summary  As of 10/13/2023      INR goal:  2.0-3.0   TTR:  100.0 % (4 d)   INR used for dosin.50 (10/13/2023)   Weekly warfarin total:  35 mg               Assessment/Plan   Therapeutic     1. New dose: no change    2. Next INR: 3 weeks      Education provided to patient during the visit:  Patient instructed to call in interim with questions, concerns and changes.   Patient educated on interactions between medications and warfarin.   Patient educated on dietary consistency in vitamin k consumption.   Patient educated on affects of alcohol consumption while taking warfarin.   Patient educated on signs of bleeding/clotting.   Patient educated on compliance with dosing, follow up appointments, and prescribed plan of care.
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
## 2023-10-17 ENCOUNTER — TELEPHONE (OUTPATIENT)
Age: 53
End: 2023-10-17
Payer: COMMERCIAL

## 2023-10-17 ENCOUNTER — TELEMEDICINE (OUTPATIENT)
Dept: PULMONOLOGY | Facility: HOSPITAL | Age: 53
End: 2023-10-17
Payer: COMMERCIAL

## 2023-10-17 ENCOUNTER — APPOINTMENT (OUTPATIENT)
Dept: PULMONOLOGY | Facility: HOSPITAL | Age: 53
End: 2023-10-17
Payer: COMMERCIAL

## 2023-10-17 DIAGNOSIS — D57.00 SICKLE CELL DISEASE WITH CRISIS (MULTI): Primary | ICD-10-CM

## 2023-10-17 PROBLEM — D57.09: Status: ACTIVE | Noted: 2023-10-17

## 2023-10-17 NOTE — PROGRESS NOTES
History of Present Illness  Patient is a 53 year old female being seen today for initial evaluation of PAH following clot event. She was referred by Dr. Hensley following admission in June. Patient was originally admitted for lethargy due to need for exchange transfusion. Hypoxia, pulmonary edema, and elevated troponin, and CHARLES were noted. Abnormal CT and V/Q led to a cath, which was also abnormal. Patient was discharged on warfarin.     Patient had SOB for ~1 week prior to June admission. She has dizziness at times, has syncopal episodes every ~3-4 months, and chest pain. Some edema present today. She has a chronic cough and fatigue. Patient denies wheezing. She does not currently require oxygen. She does have CAN and wears her CPAP 4 nights/week.     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 8.7 / C.I. 4.2; non-obstructive CAD  V/Q (6/13/23): multiple distinct wedge-shaped subsegmental and segmental perfusion defects, more in the right lung  Echo (6/10/23): moerately enlarged RV with normal function, mildly dilated RA  CTPE (6/9/23): no acute cardiopulm abnormalities, no acute pulmonary embolism, questionable eccentric filling defects in lobar PAs, borderline enlargement of MPA, borderline RA and RV enlargement, mosaic attenuation of lung parenchyma, scattered subsegmental atelectasis/scarring    Testing for today's visit includes a 6MWT, jennifer, and  DLCO. Recent labs done and will be reviewed, along with all other testing.          Patient Discussion/Summary    1) No further workup for CTEPH  2) Should see heart failure for high left sided filling pressures  3) Continue PAP for CAN  4) Recommend lifelong anticoagulation given multiple recurrent events.      Provider Impressions    1) Pulmonary  a. Sent for pulmonary hypertension.    mPAP = 36 mm Hg  RA = 15 mm Hg  CO = 8.7  LVEDP= 19 which is consistent with PCWP wedge tracings which suggest 20.    PVR = 147 Dynes.sec.cm    FAILS DEFINITION OF PRECAPILLARY PH NECESSARY FOR ANY CONSIDERATION OF PAH. ELEVATED PAP DUE TO A COMBINATION OF HIGH OUTPUT AND MODEST VOLUME OVERLOAD WITH INCREASED LEFT SIDED FILLING PRESSURES.     b. Incidental note of perfusion changes. These are most consistent with chronic small vessel infarction seen in sickle cell disease. The RU segment. has no corresponding vascular occlusion seen on 6/9/2023 CTPA. The basilar changes are likely artifact and non consistent with vascular occlusion. She, by definition does not have CTEPH since her PH is not precapillary. Unclear if RUL defect is real or not or associated with acute process. At this point, reasonable to continue anticoagulation lifelong if no complications.     c. CAN on PAP    d. Appears to have bronchospasm, written for albuterol Low normal FEV1/FVC ratio, possible restriction, no volumes. DLCO is moderately decreased even when corrected for alveolar volume, likely consistent with sickle cell infarction.     2) Cardiac  a. Heart failure and pulmonary edema  b. Paroxysmal SVT on metoprolol.   c. Recurrent DVT/PE but no CTED.- at this point lifelong anticoagulation.    3) Endocrine - BMI = 41

## 2023-10-19 ENCOUNTER — APPOINTMENT (OUTPATIENT)
Dept: NEPHROLOGY | Facility: CLINIC | Age: 53
End: 2023-10-19
Payer: COMMERCIAL

## 2023-10-19 ASSESSMENT — PATIENT HEALTH QUESTIONNAIRE - PHQ9
9. THOUGHTS THAT YOU WOULD BE BETTER OFF DEAD, OR OF HURTING YOURSELF: NOT AT ALL
10. IF YOU CHECKED OFF ANY PROBLEMS, HOW DIFFICULT HAVE THESE PROBLEMS MADE IT FOR YOU TO DO YOUR WORK, TAKE CARE OF THINGS AT HOME, OR GET ALONG WITH OTHER PEOPLE: NOT DIFFICULT AT ALL
10. IF YOU CHECKED OFF ANY PROBLEMS, HOW DIFFICULT HAVE THESE PROBLEMS MADE IT FOR YOU TO DO YOUR WORK, TAKE CARE OF THINGS AT HOME, OR GET ALONG WITH OTHER PEOPLE: NOT DIFFICULT AT ALL
9. THOUGHTS THAT YOU WOULD BE BETTER OFF DEAD, OR OF HURTING YOURSELF: 0
3. TROUBLE FALLING OR STAYING ASLEEP OR SLEEPING TOO MUCH: 0
SUM OF ALL RESPONSES TO PHQ QUESTIONS 1-9: 3
2. FEELING DOWN, DEPRESSED, IRRITABLE, OR HOPELESS: 0
1. LITTLE INTEREST OR PLEASURE IN DOING THINGS: SEVERAL DAYS
2. FEELING DOWN, DEPRESSED, IRRITABLE, OR HOPELESS: NOT AT ALL
5. POOR APPETITE OR OVEREATING: 1
4. FEELING TIRED OR HAVING LITTLE ENERGY: 1
3. TROUBLE FALLING OR STAYING ASLEEP OR SLEEPING TOO MUCH: NOT AT ALL
8. MOVING OR SPEAKING SO SLOWLY THAT OTHER PEOPLE COULD HAVE NOTICED. OR THE OPPOSITE, BEING SO FIGETY OR RESTLESS THAT YOU HAVE BEEN MOVING AROUND A LOT MORE THAN USUAL: NOT AT ALL
2. FEELING DOWN, DEPRESSED OR HOPELESS: NOT AT ALL
1. LITTLE INTEREST OR PLEASURE IN DOING THINGS: 1
4. FEELING TIRED OR HAVING LITTLE ENERGY: SEVERAL DAYS
8. MOVING OR SPEAKING SO SLOWLY THAT OTHER PEOPLE COULD HAVE NOTICED. OR THE OPPOSITE, BEING SO FIGETY OR RESTLESS THAT YOU HAVE BEEN MOVING AROUND A LOT MORE THAN USUAL: 0
9. THOUGHTS THAT YOU WOULD BE BETTER OFF DEAD, OR OF HURTING YOURSELF: NOT AT ALL
7. TROUBLE CONCENTRATING ON THINGS, SUCH AS READING THE NEWSPAPER OR WATCHING TELEVISION: 0
3. TROUBLE FALLING OR STAYING ASLEEP OR SLEEPING TOO MUCH: NOT AT ALL
4. FEELING TIRED OR HAVING LITTLE ENERGY: SEVERAL DAYS
6. FEELING BAD ABOUT YOURSELF - OR THAT YOU ARE A FAILURE OR HAVE LET YOURSELF OR YOUR FAMILY DOWN: NOT AT ALL
7. TROUBLE CONCENTRATING ON THINGS, SUCH AS READING THE NEWSPAPER OR WATCHING TELEVISION: NOT AT ALL
6. FEELING BAD ABOUT YOURSELF - OR THAT YOU ARE A FAILURE OR HAVE LET YOURSELF OR YOUR FAMILY DOWN: NOT AT ALL
5. POOR APPETITE OR OVEREATING: SEVERAL DAYS
8. MOVING OR SPEAKING SO SLOWLY THAT OTHER PEOPLE COULD HAVE NOTICED. OR THE OPPOSITE, BEING SO FIGETY OR RESTLESS THAT YOU HAVE BEEN MOVING AROUND A LOT MORE THAN USUAL: NOT AT ALL
7. TROUBLE CONCENTRATING ON THINGS, SUCH AS READING THE NEWSPAPER OR WATCHING TELEVISION: NOT AT ALL
5. POOR APPETITE OR OVEREATING: SEVERAL DAYS
6. FEELING BAD ABOUT YOURSELF - OR THAT YOU ARE A FAILURE OR HAVE LET YOURSELF OR YOUR FAMILY DOWN: 0
1. LITTLE INTEREST OR PLEASURE IN DOING THINGS: SEVERAL DAYS
SUM OF ALL RESPONSES TO PHQ QUESTIONS 1-9: 3

## 2023-10-23 ENCOUNTER — TELEPHONE (OUTPATIENT)
Dept: ADMISSION | Facility: HOSPITAL | Age: 53
End: 2023-10-23
Payer: COMMERCIAL

## 2023-10-23 DIAGNOSIS — D57.00 SICKLE CELL CRISIS (MULTI): ICD-10-CM

## 2023-10-23 RX ORDER — OXYCODONE HYDROCHLORIDE 10 MG/1
10 TABLET ORAL EVERY 4 HOURS PRN
Qty: 75 TABLET | Refills: 0 | Status: SHIPPED | OUTPATIENT
Start: 2023-10-23 | End: 2023-11-07 | Stop reason: SDUPTHER

## 2023-10-23 NOTE — TELEPHONE ENCOUNTER
Pt requesting a refill on oxycodone 10mg every 4hrs prn, #75.  Last rx'd 10/2/23.  She uses [facility] in [place].

## 2023-10-24 ENCOUNTER — LAB (OUTPATIENT)
Dept: LAB | Facility: HOSPITAL | Age: 53
DRG: 812 | End: 2023-10-24
Payer: COMMERCIAL

## 2023-10-24 ENCOUNTER — OFFICE VISIT (OUTPATIENT)
Dept: HEMATOLOGY/ONCOLOGY | Facility: HOSPITAL | Age: 53
DRG: 812 | End: 2023-10-24
Payer: COMMERCIAL

## 2023-10-24 VITALS
HEART RATE: 87 BPM | HEIGHT: 64 IN | RESPIRATION RATE: 17 BRPM | OXYGEN SATURATION: 94 % | BODY MASS INDEX: 41.19 KG/M2 | SYSTOLIC BLOOD PRESSURE: 103 MMHG | WEIGHT: 241.3 LBS | TEMPERATURE: 98.8 F | DIASTOLIC BLOOD PRESSURE: 64 MMHG

## 2023-10-24 VITALS
TEMPERATURE: 99.1 F | RESPIRATION RATE: 18 BRPM | HEART RATE: 91 BPM | SYSTOLIC BLOOD PRESSURE: 112 MMHG | DIASTOLIC BLOOD PRESSURE: 56 MMHG | OXYGEN SATURATION: 95 %

## 2023-10-24 DIAGNOSIS — D57.00 SICKLE CELL DISEASE WITH CRISIS (MULTI): ICD-10-CM

## 2023-10-24 DIAGNOSIS — D57.00 SICKLE CELL CRISIS (MULTI): Primary | ICD-10-CM

## 2023-10-24 DIAGNOSIS — D57.00 SICKLE CELL DISEASE WITH CRISIS (MULTI): Primary | ICD-10-CM

## 2023-10-24 LAB
ABO GROUP (TYPE) IN BLOOD: NORMAL
ALBUMIN SERPL BCP-MCNC: 3.9 G/DL (ref 3.4–5)
ALP SERPL-CCNC: 112 U/L (ref 33–110)
ALT SERPL W P-5'-P-CCNC: 6 U/L (ref 7–45)
ANION GAP SERPL CALC-SCNC: 10 MMOL/L (ref 10–20)
ANTIBODY SCREEN: NORMAL
AST SERPL W P-5'-P-CCNC: 12 U/L (ref 9–39)
ATRIAL RATE: 73 BPM
BASOPHILS # BLD AUTO: 0.02 X10*3/UL (ref 0–0.1)
BASOPHILS NFR BLD AUTO: 0.2 %
BILIRUB SERPL-MCNC: 1.1 MG/DL (ref 0–1.2)
BUN SERPL-MCNC: 12 MG/DL (ref 6–23)
CA-I BLD-SCNC: 1.23 MMOL/L (ref 1.1–1.33)
CALCIUM SERPL-MCNC: 9.5 MG/DL (ref 8.6–10.3)
CHLORIDE SERPL-SCNC: 104 MMOL/L (ref 98–107)
CO2 SERPL-SCNC: 31 MMOL/L (ref 21–32)
CREAT SERPL-MCNC: 1.3 MG/DL (ref 0.5–1.05)
EOSINOPHIL # BLD AUTO: 0.21 X10*3/UL (ref 0–0.7)
EOSINOPHIL NFR BLD AUTO: 1.6 %
ERYTHROCYTE [DISTWIDTH] IN BLOOD BY AUTOMATED COUNT: 29.2 % (ref 11.5–14.5)
FERRITIN SERPL-MCNC: 25 NG/ML (ref 8–150)
GFR SERPL CREATININE-BSD FRML MDRD: 49 ML/MIN/1.73M*2
GLUCOSE SERPL-MCNC: 93 MG/DL (ref 74–99)
HCT VFR BLD AUTO: 29.2 % (ref 36–46)
HGB BLD-MCNC: 9.6 G/DL (ref 12–16)
HGB RETIC QN: 23 PG (ref 28–38)
IMM GRANULOCYTES # BLD AUTO: 0.05 X10*3/UL (ref 0–0.7)
IMM GRANULOCYTES NFR BLD AUTO: 0.4 % (ref 0–0.9)
IMMATURE RETIC FRACTION: 53.9 %
LDH SERPL L TO P-CCNC: 155 U/L (ref 84–246)
LYMPHOCYTES # BLD AUTO: 2.39 X10*3/UL (ref 1.2–4.8)
LYMPHOCYTES NFR BLD AUTO: 18.6 %
MCH RBC QN AUTO: 23.6 PG (ref 26–34)
MCHC RBC AUTO-ENTMCNC: 32.9 G/DL (ref 32–36)
MCV RBC AUTO: 72 FL (ref 80–100)
MONOCYTES # BLD AUTO: 0.91 X10*3/UL (ref 0.1–1)
MONOCYTES NFR BLD AUTO: 7.1 %
NEUTROPHILS # BLD AUTO: 9.27 X10*3/UL (ref 1.2–7.7)
NEUTROPHILS NFR BLD AUTO: 72.1 %
NRBC BLD-RTO: 5.8 /100 WBCS (ref 0–0)
P AXIS: 68 DEGREES
P OFFSET: 198 MS
P ONSET: 146 MS
PAPPENHEIMER BOD BLD QL SMEAR: PRESENT
PLATELET # BLD AUTO: 353 X10*3/UL (ref 150–450)
PMV BLD AUTO: 9.3 FL (ref 7.5–11.5)
POLYCHROMASIA BLD QL SMEAR: NORMAL
POTASSIUM SERPL-SCNC: 3.8 MMOL/L (ref 3.5–5.3)
PR INTERVAL: 164 MS
PROT SERPL-MCNC: 7.5 G/DL (ref 6.4–8.2)
Q ONSET: 228 MS
QRS COUNT: 12 BEATS
QRS DURATION: 82 MS
QT INTERVAL: 388 MS
QTC CALCULATION(BAZETT): 427 MS
QTC FREDERICIA: 414 MS
R AXIS: 74 DEGREES
RBC # BLD AUTO: 4.07 X10*6/UL (ref 4–5.2)
RBC MORPH BLD: NORMAL
RETICS #: 0.02 X10*6/UL (ref 0.02–0.08)
RETICS/RBC NFR AUTO: 0.4 % (ref 0.5–2)
RH FACTOR (ANTIGEN D): NORMAL
SODIUM SERPL-SCNC: 141 MMOL/L (ref 136–145)
T AXIS: 83 DEGREES
T OFFSET: 422 MS
TARGETS BLD QL SMEAR: NORMAL
VENTRICULAR RATE: 73 BPM
WBC # BLD AUTO: 12.9 X10*3/UL (ref 4.4–11.3)

## 2023-10-24 PROCEDURE — 83021 HEMOGLOBIN CHROMOTOGRAPHY: CPT

## 2023-10-24 PROCEDURE — S0119 ONDANSETRON 4 MG: HCPCS | Performed by: NURSE PRACTITIONER

## 2023-10-24 PROCEDURE — 86160 COMPLEMENT ANTIGEN: CPT | Performed by: PHYSICIAN ASSISTANT

## 2023-10-24 PROCEDURE — 96372 THER/PROPH/DIAG INJ SC/IM: CPT | Performed by: NURSE PRACTITIONER

## 2023-10-24 PROCEDURE — 82728 ASSAY OF FERRITIN: CPT

## 2023-10-24 PROCEDURE — 86140 C-REACTIVE PROTEIN: CPT | Performed by: PHYSICIAN ASSISTANT

## 2023-10-24 PROCEDURE — 86147 CARDIOLIPIN ANTIBODY EA IG: CPT | Performed by: PHYSICIAN ASSISTANT

## 2023-10-24 PROCEDURE — 99214 OFFICE O/P EST MOD 30 MIN: CPT | Performed by: INTERNAL MEDICINE

## 2023-10-24 PROCEDURE — 80053 COMPREHEN METABOLIC PANEL: CPT

## 2023-10-24 PROCEDURE — 1036F TOBACCO NON-USER: CPT | Performed by: NURSE PRACTITIONER

## 2023-10-24 PROCEDURE — 3008F BODY MASS INDEX DOCD: CPT | Performed by: INTERNAL MEDICINE

## 2023-10-24 PROCEDURE — 86850 RBC ANTIBODY SCREEN: CPT

## 2023-10-24 PROCEDURE — 86900 BLOOD TYPING SEROLOGIC ABO: CPT

## 2023-10-24 PROCEDURE — 1036F TOBACCO NON-USER: CPT | Performed by: INTERNAL MEDICINE

## 2023-10-24 PROCEDURE — 83615 LACTATE (LD) (LDH) ENZYME: CPT

## 2023-10-24 PROCEDURE — 82330 ASSAY OF CALCIUM: CPT

## 2023-10-24 PROCEDURE — 86038 ANTINUCLEAR ANTIBODIES: CPT

## 2023-10-24 PROCEDURE — 86235 NUCLEAR ANTIGEN ANTIBODY: CPT

## 2023-10-24 PROCEDURE — 2500000004 HC RX 250 GENERAL PHARMACY W/ HCPCS (ALT 636 FOR OP/ED): Performed by: NURSE PRACTITIONER

## 2023-10-24 PROCEDURE — 85025 COMPLETE CBC W/AUTO DIFF WBC: CPT

## 2023-10-24 PROCEDURE — 83020 HEMOGLOBIN ELECTROPHORESIS: CPT | Performed by: PATHOLOGY

## 2023-10-24 PROCEDURE — 99215 OFFICE O/P EST HI 40 MIN: CPT | Mod: 25 | Performed by: NURSE PRACTITIONER

## 2023-10-24 PROCEDURE — 2500000005 HC RX 250 GENERAL PHARMACY W/O HCPCS: Performed by: NURSE PRACTITIONER

## 2023-10-24 PROCEDURE — 2500000001 HC RX 250 WO HCPCS SELF ADMINISTERED DRUGS (ALT 637 FOR MEDICARE OP): Performed by: NURSE PRACTITIONER

## 2023-10-24 PROCEDURE — 3008F BODY MASS INDEX DOCD: CPT | Performed by: NURSE PRACTITIONER

## 2023-10-24 PROCEDURE — 36415 COLL VENOUS BLD VENIPUNCTURE: CPT

## 2023-10-24 PROCEDURE — 86146 BETA-2 GLYCOPROTEIN ANTIBODY: CPT | Performed by: PHYSICIAN ASSISTANT

## 2023-10-24 PROCEDURE — 96372 THER/PROPH/DIAG INJ SC/IM: CPT

## 2023-10-24 PROCEDURE — 85045 AUTOMATED RETICULOCYTE COUNT: CPT

## 2023-10-24 RX ORDER — ONDANSETRON HYDROCHLORIDE 8 MG/1
8 TABLET, FILM COATED ORAL ONCE
Status: COMPLETED | OUTPATIENT
Start: 2023-10-24 | End: 2023-10-24

## 2023-10-24 RX ORDER — CYCLOBENZAPRINE HCL 10 MG
10 TABLET ORAL ONCE
Status: COMPLETED | OUTPATIENT
Start: 2023-10-24 | End: 2023-10-24

## 2023-10-24 RX ORDER — ACETAMINOPHEN 325 MG/1
650 TABLET ORAL ONCE
Status: DISCONTINUED | OUTPATIENT
Start: 2023-10-24 | End: 2023-10-24 | Stop reason: HOSPADM

## 2023-10-24 RX ORDER — HYDROMORPHONE HYDROCHLORIDE 1 MG/ML
0.5 INJECTION, SOLUTION INTRAMUSCULAR; INTRAVENOUS; SUBCUTANEOUS ONCE
Status: DISCONTINUED | OUTPATIENT
Start: 2023-10-24 | End: 2023-10-24 | Stop reason: ALTCHOICE

## 2023-10-24 RX ADMIN — CYCLOBENZAPRINE 10 MG: 10 TABLET, FILM COATED ORAL at 12:13

## 2023-10-24 RX ADMIN — HYDROMORPHONE HYDROCHLORIDE 1 MG: 2 INJECTION, SOLUTION INTRAMUSCULAR; INTRAVENOUS; SUBCUTANEOUS at 13:05

## 2023-10-24 RX ADMIN — HYDROMORPHONE HYDROCHLORIDE 1 MG: 2 INJECTION, SOLUTION INTRAMUSCULAR; INTRAVENOUS; SUBCUTANEOUS at 13:47

## 2023-10-24 RX ADMIN — HYDROMORPHONE HYDROCHLORIDE 0.5 MG: 2 INJECTION, SOLUTION INTRAMUSCULAR; INTRAVENOUS; SUBCUTANEOUS at 12:15

## 2023-10-24 RX ADMIN — ONDANSETRON HYDROCHLORIDE 8 MG: 8 TABLET, FILM COATED ORAL at 12:13

## 2023-10-24 ASSESSMENT — ENCOUNTER SYMPTOMS
PSYCHIATRIC NEGATIVE: 1
CONSTITUTIONAL NEGATIVE: 1
HOT FLASHES: 1
RESPIRATORY NEGATIVE: 1
GASTROINTESTINAL NEGATIVE: 1
HEMATOLOGIC/LYMPHATIC NEGATIVE: 1
CARDIOVASCULAR NEGATIVE: 1
APPETITE CHANGE: 1
PSYCHIATRIC NEGATIVE: 1
DIZZINESS: 1
NEUROLOGICAL NEGATIVE: 1
CARDIOVASCULAR NEGATIVE: 1
MUSCULOSKELETAL NEGATIVE: 1
FATIGUE: 1
EYES NEGATIVE: 1
NAUSEA: 1
ENDOCRINE NEGATIVE: 1
EYES NEGATIVE: 1
HEMATOLOGIC/LYMPHATIC NEGATIVE: 1

## 2023-10-24 ASSESSMENT — PAIN SCALES - GENERAL
PAINLEVEL: 9
PAINLEVEL: 9
PAINLEVEL_OUTOF10: 7
PAINLEVEL_OUTOF10: 8
PAINLEVEL_OUTOF10: 9

## 2023-10-24 NOTE — PROGRESS NOTES
Patient ID: Senait Narvaez is a 53 y.o. female.  Referring Physician: No referring provider defined for this encounter.  Primary Care Provider: Eric Wei MD  Visit Type: Follow Up      Subjective    HPI  Senait is not feeling well today, has not felt well for her the whole month after her exchange. She has pain all over her body, describes as a throbbing that is constant. Pain is worse in her legs. Just had pain in her back and shot down her right leg. Gabapentin gave her bad diarrhea and she doenst want to try again. Pain starts bad in the morning and she gets up slowly, doesn't improve or get worse. Feels her pain medication has been ineffective. Pain is a 9/10, took 20mg oxycodone at 10pm, didn't fall asleep. She would like to go [facility] today. Considering a break from exchanges in the future.      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 Has been evaluated for BMT but donor was considered to be too high risk.   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   History of acute chest syndrome.   Question of fibromyalgia.   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   Obstructive Sleep and significant nocturnal hemoglobin desaturation (greater than 40% of sleep time with an SaO2 of less than 90%).   Bilateral lower extremity edema, recurrent  SVC syndrome: She was diagnosed with SVC syndrome, she had Bilateral Upper Extremity and Facial Swelling d/t partial filling defect within the SVC and a thrombus of the SVC complicated by bilateral Mediport catheters. Vasc med consulted, rec lifelong coumadin. She was on heparin drip bridge and coumadin was initiated on 1/16. INR 3.0 on 1/29/2019.  She is status post Venogram, SVC balloon angioplasty and Right mediport removal (1/15/20) and status post placement of left IJ temporary apharesis catheter, SVC angiogram, Balloon angioplasty of SVC/left brachiocephalic vein, Removal of left sided Mediport catheter (1/21/20). She was given IV diuretics lasix 2/26-29 for UE edema 2/2 SVC syndrome with edema. Breast remain swollen recommended breast binder. SVC in Nov 2022 with stent placed.   Syncopal episodes - referred to Neuro   11/22 hospitalization for SVC stricture; s/p SVC angioplasty, which was successful.  FUV in IR 12/9/22.   Bilateral Upper Extremity and Facial Swelling d/t partial filling defect within the SVC and a thrombus of the SVC complicated by bilateral Mediport catheters.   Balloon removed via IR on April 2023.   SVT event with conversion with 1 dose 6mg of adenosine on 5/5/23  Admission June 2023 underwent RHC/LHC on 6/16 with mPA pressure 36, wedge 14, CI 4.2 and her coronary angiogram revealed no angiographic evidence of obstructive CAD. Dx of pulm HTN.     Review of Systems   Constitutional:  Positive for appetite change and fatigue.   Eyes: Negative.    Cardiovascular: Negative.    Gastrointestinal:  Positive for nausea.   Endocrine: Positive for hot flashes.   Genitourinary: Negative.     Neurological:  Positive for dizziness.   Hematological: Negative.    Psychiatric/Behavioral: Negative.          Objective   BSA: There is no height or weight on file to calculate BSA.  There were no vitals taken for this visit.     has a past medical history of Asthma, CHF (congestive heart failure) (CMS/[facility]), Chronic pain disorder, Compression of vein (02/19/2020), and Hypotension, unspecified (12/10/2020).   has a past surgical history that includes Appendectomy (08/05/2013); Cholecystectomy (08/05/2013); Other surgical history (05/13/2014); Other surgical history (10/09/2014); Other surgical history (06/09/2014); MR angio head wo IV contrast (8/16/2014);  MR angio neck wo IV contrast (8/16/2014); IR CVC tunneled (3/13/2015); IR CVC tunneled (1/29/2016); IR CVC tunneled (1/17/2018); IR CVC tunneled (2/22/2018); IR CVC tunneled (3/22/2018); IR CVC tunneled (4/18/2018); IR CVC tunneled (5/16/2018); IR CVC tunneled (6/12/2018); US guided biopsy lymph node superficial (9/17/2019); CT guided percutaneous biopsy LYMPH node superficial (9/19/2019); IR CVC tunneled (1/21/2020); IR CVC tunneled (2/28/2020); IR CVC tunneled (4/10/2020); IR CVC tunneled (5/22/2020); IR CVC tunneled (6/19/2020); IR CVC tunneled (7/23/2020); IR CVC tunneled (9/4/2020); IR CVC tunneled (10/9/2020); IR CVC tunneled (11/13/2020); IR CVC tunneled (12/18/2020); IR CVC tunneled (1/22/2021); IR CVC tunneled (2/26/2021); IR CVC tunneled (4/2/2021); IR CVC tunneled (5/7/2021); IR CVC tunneled (6/11/2021); IR CVC tunneled (7/16/2021); IR CVC tunneled (8/20/2021); IR CVC tunneled (9/24/2021); IR CVC tunneled (10/29/2021); IR CVC tunneled (12/3/2021); IR CVC tunneled (1/7/2022); IR CVC tunneled (2/23/2022); IR CVC tunneled (3/25/2022); IR CVC tunneled (4/22/2022); IR CVC tunneled (6/3/2022); IR CVC tunneled (9/18/2017); IR CVC tunneled (10/30/2017); IR CVC tunneled (12/19/2017); IR CVC tunneled (1/6/2023); IR CVC tunneled (2/24/2023); IR CVC tunneled (7/8/2022); IR CVC tunneled (8/12/2022); IR CVC tunneled (9/16/2022); CT angio neck w and wo IV contrast (10/19/2022); IR CVC tunneled (10/20/2022); IR CVC tunneled (11/29/2022); IR CVC tunneled (3/31/2023); IR CVC tunneled (6/9/2023); IR CVC tunneled (7/20/2023); IR CVC tunneled (8/16/2023); and IR CVC tunneled (9/21/2023).  Family History   Problem Relation Name Age of Onset    Diabetes Father      Gout Father      Sickle cell trait Father      Seizures Sister      Diabetes Other      Uterine cancer Other      Other (congenital blindness) Cousin     Grandfather had RA   Oncology History    No history exists.       Senait Solange  reports that she has quit smoking.  Her smoking use included cigarettes. She has been exposed to tobacco smoke. She has never used smokeless tobacco.  She  reports that she does not currently use alcohol.  She  reports that she does not currently use drugs.    Physical Exam  Constitutional:       Appearance: She is ill-appearing.   HENT:      Head: Normocephalic.      Right Ear: Tympanic membrane normal.      Left Ear: Tympanic membrane normal.      Nose: Nose normal.   Eyes:      Conjunctiva/sclera: Conjunctivae normal.   Cardiovascular:      Pulses: Normal pulses.      Heart sounds: Normal heart sounds.   Pulmonary:      Effort: Pulmonary effort is normal.      Breath sounds: Normal breath sounds.   Abdominal:      General: Bowel sounds are normal.   Musculoskeletal:         General: Tenderness present.      Cervical back: Normal range of motion.   Skin:     General: Skin is warm and dry.   Neurological:      Mental Status: She is oriented to person, place, and time.   Psychiatric:         Mood and Affect: Mood normal.         WBC   Date/Time Value Ref Range Status   09/23/2023 08:51 AM 9.2 4.4 - 11.3 x10E9/L Final   09/22/2023 08:58 AM 8.0 4.4 - 11.3 x10E9/L Final   09/22/2023 05:24 AM CANCELED       Comment:     Result canceled by the ancillary.     nRBC   Date Value Ref Range Status   09/23/2023 5.5 0.0 - 0.0 /100 WBC Final   09/22/2023 4.0 0.0 - 0.0 /100 WBC Final   09/22/2023 CANCELED       Comment:     Result canceled by the ancillary.     RBC   Date Value Ref Range Status   09/23/2023 3.17 (L) 4.00 - 5.20 x10E12/L Final   09/22/2023 3.20 (L) 4.00 - 5.20 x10E12/L Final   09/22/2023 CANCELED       Comment:     Result canceled by the ancillary.     Hemoglobin   Date Value Ref Range Status   09/23/2023 8.2 (L) 12.0 - 16.0 g/dL Final   09/22/2023 8.7 (L) 12.0 - 16.0 g/dL Final   09/22/2023 CANCELED       Comment:     Result canceled by the ancillary.     Hematocrit   Date Value Ref Range Status   09/23/2023 24.4 (L) 36.0 - 46.0 % Final    09/22/2023 25.3 (L) 36.0 - 46.0 % Final   09/22/2023 CANCELED       Comment:     Result canceled by the ancillary.     MCV   Date/Time Value Ref Range Status   09/23/2023 08:51 AM 77 (L) 80 - 100 fL Final   09/22/2023 08:58 AM 79 (L) 80 - 100 fL Final   09/22/2023 05:24 AM CANCELED       Comment:     Result canceled by the ancillary.     MCH   Date/Time Value Ref Range Status   08/12/2023 03:07 PM 23.7 (L) 26 - 34 PG Final     MCHC   Date/Time Value Ref Range Status   09/23/2023 08:51 AM 33.6 32.0 - 36.0 g/dL Final   09/22/2023 08:58 AM 34.4 32.0 - 36.0 g/dL Final   09/22/2023 05:24 AM CANCELED       Comment:     Result canceled by the ancillary.     RDW   Date/Time Value Ref Range Status   09/23/2023 08:51 AM 26.3 (H) 11.5 - 14.5 % Final   09/22/2023 08:58 AM 26.4 (H) 11.5 - 14.5 % Final   09/22/2023 05:24 AM CANCELED       Comment:     Result canceled by the ancillary.     Platelets   Date/Time Value Ref Range Status   09/23/2023 08:51  150 - 450 x10E9/L Final   09/22/2023 08:58  150 - 450 x10E9/L Final   09/22/2023 05:24 AM CANCELED       Comment:     Result canceled by the ancillary.     MPV   Date/Time Value Ref Range Status   08/12/2023 03:07 PM 9.4 7.0 - 12.6 CU Final     Neutrophils %   Date/Time Value Ref Range Status   09/22/2023 05:24 AM CANCELED       Comment:     Result canceled by the ancillary.   09/21/2023 07:15 AM CANCELED       Comment:     Result canceled by the ancillary.   09/18/2023 10:04 AM 76.1 40.0 - 80.0 % Final     Immature Granulocytes %, Automated   Date/Time Value Ref Range Status   09/23/2023 08:51 AM 0.3 0.0 - 0.9 % Final     Comment:      Immature Granulocyte Count (IG) includes promyelocytes,    myelocytes and metamyelocytes but does not include bands.   Percent differential counts (%) should be interpreted in the   context of the absolute cell counts (cells/L).     09/22/2023 08:58 AM 0.4 0.0 - 0.9 % Final     Comment:      Immature Granulocyte Count (IG) includes  promyelocytes,    myelocytes and metamyelocytes but does not include bands.   Percent differential counts (%) should be interpreted in the   context of the absolute cell counts (cells/L).     09/22/2023 05:24 AM CANCELED       Comment:      Immature Granulocyte Count (IG) includes promyelocytes,    myelocytes and metamyelocytes but does not include bands.   Percent differential counts (%) should be interpreted in the   context of the absolute cell counts (cells/L).    Result canceled by the ancillary.       Lymphocytes %   Date/Time Value Ref Range Status   09/23/2023 08:51 AM 27.4 13.0 - 44.0 % Final   09/22/2023 08:58 AM 20.9 13.0 - 44.0 % Final   09/22/2023 05:24 AM CANCELED       Comment:     Result canceled by the ancillary.   09/21/2023 01:37 PM 20.0 13.0 - 44.0 % Final     Monocytes %   Date/Time Value Ref Range Status   09/23/2023 08:51 AM 8.5 2.0 - 10.0 % Final   09/22/2023 08:58 AM 4.3 2.0 - 10.0 % Final   09/22/2023 05:24 AM CANCELED       Comment:     Result canceled by the ancillary.   09/21/2023 01:37 PM 2.6 2.0 - 10.0 % Final     Eosinophils %   Date/Time Value Ref Range Status   09/23/2023 08:51 AM 3.4 0.0 - 6.0 % Final   09/22/2023 08:58 AM 4.4 0.0 - 6.0 % Final   09/22/2023 05:24 AM CANCELED       Comment:     Result canceled by the ancillary.   09/21/2023 01:37 PM 0.0 0.0 - 6.0 % Final     Basophils %   Date/Time Value Ref Range Status   09/23/2023 08:51 AM 0.0 0.0 - 2.0 % Final   09/22/2023 08:58 AM 0.0 0.0 - 2.0 % Final   09/22/2023 05:24 AM CANCELED       Comment:     Result canceled by the ancillary.   09/21/2023 01:37 PM 0.0 0.0 - 2.0 % Final     Neutrophils Absolute   Date/Time Value Ref Range Status   09/22/2023 05:24 AM CANCELED       Comment:     Result canceled by the ancillary.   09/21/2023 07:15 AM CANCELED       Comment:     Result canceled by the ancillary.   09/18/2023 10:04 AM 7.46 1.20 - 7.70 x10E9/L Final     Immature Granulocytes Absolute, Automated   Date/Time Value Ref Range  "Status   08/12/2023 03:07 PM 0.12 (H) 0.0 - 0.1 K/UL Final     Lymphocytes Absolute   Date/Time Value Ref Range Status   09/22/2023 05:24 AM CANCELED       Comment:     Result canceled by the ancillary.   09/21/2023 07:15 AM CANCELED       Comment:     Result canceled by the ancillary.   09/18/2023 10:04 AM 1.41 1.20 - 4.80 x10E9/L Final     Monocytes Absolute   Date/Time Value Ref Range Status   09/22/2023 05:24 AM CANCELED       Comment:     Result canceled by the ancillary.   09/21/2023 07:15 AM CANCELED       Comment:     Result canceled by the ancillary.   09/18/2023 10:04 AM 0.74 0.10 - 1.00 x10E9/L Final     Eosinophils Absolute   Date/Time Value Ref Range Status   09/23/2023 08:51 AM 0.31 0.00 - 0.70 x10E9/L Final   09/22/2023 08:58 AM 0.35 0.00 - 0.70 x10E9/L Final   09/22/2023 05:24 AM CANCELED       Comment:     Result canceled by the ancillary.   09/21/2023 01:37 PM 0.00 0.00 - 0.70 x10E9/L Final     Basophils Absolute   Date/Time Value Ref Range Status   09/23/2023 08:51 AM 0.00 0.00 - 0.10 x10E9/L Final   09/22/2023 08:58 AM 0.00 0.00 - 0.10 x10E9/L Final   09/22/2023 05:24 AM CANCELED       Comment:     Result canceled by the ancillary.   09/21/2023 01:37 PM 0.00 0.00 - 0.10 x10E9/L Final       No components found for: \"PT\"  aPTT   Date/Time Value Ref Range Status   09/23/2023 08:51 AM 30 27 - 38 sec Final     Comment:     Note new reference range as of 6/20/2023 at 10:00am.   09/22/2023 08:58 AM 29 27 - 38 sec Final     Comment:     Note new reference range as of 6/20/2023 at 10:00am.   09/21/2023 07:15 AM CANCELED       Comment:     Note new reference range as of 6/20/2023 at 10:00am.    Result canceled by the ancillary.         Assessment/Plan        Restart duloxetine at 20mg for one month, then will evaluate and increase to 30mg  Continue 10-20mg oxycodone as needed for pain (declined option to trial hydromorphone)   ISABELLE w reflex pending- if positive will refer to rheumatology (saw inpt)   RBCX " on 10/27- pt to be admitted on 10/26  Referral to gyn for visit and management of hot flashes  Pt will schedule optho visit on her own  Next fuv prior to RBCX  Will consider pain management referral- will need cardiac clearance prior to treatment after rheumatology assessment      Senait Narvaez is a 53 year old with   1. History of Hb SC SCD and chronic pain   - oral tylenol as needed for mild to moderate pain   - oral oxycodone 10 mg every 4-6 hours as needed for moderate to severe and breakthrough pain   - oral duloxetine for chronic pain and aslo anxiety/depression   - reviewed [place]RS  - discussed non pharmacologic methods of pain control     2. Encounter for DMT with full rbc exchange every 5 weeks and next scheduled on 9/20/23, indication being MSOF and chronic pain. She has very poor venous access and using femoral veins for apheresis   - proceed with planned full exchange as scheduled. This will be performed under telemetry on account of recurrent arrythmia     3. History of recurrent VTE/PE with SVC syndrome. She is on life long anticoagulation with warfarin   - continue oral warfarin 5 mg daily with target INR of 2-3     4.  History of bronchial asthma and seasonal allergies   - as needed albuterol   - continue inhaled nasal steroids   - continue as needed loratidine   - observe cough and if worsens, will give us a call     5. History of insominia   - continue oral trazodone     6. History of CAN on CPAP with 2L of supplemental oxygen    7. Anemia with HB of 11.2 g/dl   - continue daily folic acid     8. Chronic constipation   - colase and miralax as needed     9. History of tachycardia and arrythmia/SVT, well controlled   - metoprolol as prescribed   - follow up with cardiology as scheduled     10. CKD with  eGFR of 75  - follow up with nephrology as scheduled   - avoid nephrotoxic medications     11. Pulmonary hypertension   - follow up with pulmonology as scheduled          Problem List Items Addressed This  Visit    None  Visit Diagnoses       Sickle cell disease with crisis (CMS/HCC)    -  Primary    Relevant Orders    Calcium, Ionized    CBC and Auto Differential    Comprehensive Metabolic Panel    Reticulocytes    Lactate Dehydrogenase    HEMOGLOBIN IDENTIFICATION WITH PATH REVIEW    Ferritin    Blood typing and cross match             Maria De Jesus Watt, GRISEL-CNP

## 2023-10-24 NOTE — PROGRESS NOTES
Patient ID: Senait Narvaez is a 53 y.o. female.  Referring Physician: No referring provider defined for this encounter.  Primary Care Provider: Eric Wei MD  Visit Type: Follow Up      Subjective    HPI  Pt presents to [facility] for evaluation of uncontrolled generalized pain.  Last pain dose taken last night at 2200 with no relief.  She denies   Denies CP, cough, sob, n/v/d or abd pain, headache or blurry vision. Pt denies COVID exposure, fever or chills. Plans to get flu vaccine.  No other concerns today.      Review of Systems   Constitutional: Negative.    HENT:  Negative.     Eyes: Negative.    Respiratory: Negative.     Cardiovascular: Negative.    Gastrointestinal: Negative.    Endocrine: Negative.    Genitourinary: Negative.     Musculoskeletal: Negative.    Skin: Negative.    Neurological: Negative.    Hematological: Negative.    Psychiatric/Behavioral: Negative.          Objective   BSA: There is no height or weight on file to calculate BSA.    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 Has been evaluated  for BMT but donor was considered to be too high risk.    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    History of acute chest syndrome.    Question of fibromyalgia.    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    Obstructive Sleep and significant nocturnal hemoglobin desaturation (greater than 40% of sleep time with an SaO2 of less than 90%).    Bilateral lower extremity edema, recurrent   SVC syndrome: She was diagnosed with SVC  syndrome, she had Bilateral Upper Extremity and Facial Swelling d/t partial filling defect within the SVC and a thrombus of the SVC complicated by bilateral Mediport catheters. Vasc med consulted, rec lifelong  coumadin. She was on heparin drip bridge and coumadin was initiated on 1/16. INR 3.0 on 1/29/2019. She is status post Venogram, SVC balloon angioplasty and Right mediport removal (1/15/20) and status post placement of left IJ temporary apharesis catheter,  SVC angiogram, Balloon angioplasty of SVC/left brachiocephalic vein, Removal of left sided Mediport catheter (1/21/20). She was given IV diuretics lasix 2/26-29 for UE edema 2/2 SVC syndrome with edema. Breast remain swollen recommended breast binder.  SVC in Nov 2022 with stent placed.    Syncopal episodes - referred to Neuro    11/22 hospitalization for SVC stricture; s/p SVC angioplasty, which was successful.  FUV in IR 12/9/22.   Bilateral Upper Extremity and Facial Swelling d/t partial filling defect within the  SVC and a thrombus of the SVC complicated by bilateral Mediport catheters.    Balloon removed via IR on April 2023.    SVT event with conversion with 1 dose 6mg of adenosine on 5/5/23   Admission June 2023 underwent RHC/LHC on 6/16 with mPA pressure 36, wedge 14, CI 4.2 and her coronary angiogram revealed no angiographic evidence of obstructive CAD.  Dx of pulm HTN.       Family History   Problem Relation Name Age of Onset    Diabetes Father      Gout Father      Sickle cell trait Father      Seizures Sister      Diabetes Other      Uterine cancer Other      Other (congenital blindness) Cousin         Physical Exam  Vitals reviewed.   Constitutional:       Appearance: Normal appearance.   HENT:      Head: Normocephalic.      Nose: Nose normal.      Mouth/Throat:      Mouth: Mucous membranes are moist.      Pharynx: Oropharynx is clear.   Eyes:      Extraocular Movements: Extraocular movements intact.      Conjunctiva/sclera: Conjunctivae  normal.      Pupils: Pupils are equal, round, and reactive to light.   Cardiovascular:      Rate and Rhythm: Normal rate and regular rhythm.      Pulses: Normal pulses.      Heart sounds: Normal heart sounds.   Pulmonary:      Breath sounds: Normal breath sounds.   Abdominal:      General: Bowel sounds are normal.      Palpations: Abdomen is soft.   Musculoskeletal:         General: Normal range of motion.      Cervical back: Normal range of motion and neck supple.   Skin:     General: Skin is warm and dry.      Capillary Refill: Capillary refill takes less than 2 seconds.   Neurological:      General: No focal deficit present.      Mental Status: She is alert and oriented to person, place, and time.   Psychiatric:         Mood and Affect: Mood normal.         Behavior: Behavior normal.         Thought Content: Thought content normal.         Judgment: Judgment normal.         WBC   Date/Time Value Ref Range Status   10/24/2023 11:03 AM 12.9 (H) 4.4 - 11.3 x10*3/uL Final   09/23/2023 08:51 AM 9.2 4.4 - 11.3 x10E9/L Final   09/22/2023 08:58 AM 8.0 4.4 - 11.3 x10E9/L Final   09/22/2023 05:24 AM CANCELED       Comment:     Result canceled by the ancillary.     nRBC   Date Value Ref Range Status   10/24/2023 5.8 (H) 0.0 - 0.0 /100 WBCs Final   09/23/2023 5.5 0.0 - 0.0 /100 WBC Final   09/22/2023 4.0 0.0 - 0.0 /100 WBC Final   09/22/2023 CANCELED       Comment:     Result canceled by the ancillary.     RBC   Date Value Ref Range Status   10/24/2023 4.07 4.00 - 5.20 x10*6/uL Final   09/23/2023 3.17 (L) 4.00 - 5.20 x10E12/L Final   09/22/2023 3.20 (L) 4.00 - 5.20 x10E12/L Final   09/22/2023 CANCELED       Comment:     Result canceled by the ancillary.     Hemoglobin   Date Value Ref Range Status   10/24/2023 9.6 (L) 12.0 - 16.0 g/dL Final   09/23/2023 8.2 (L) 12.0 - 16.0 g/dL Final   09/22/2023 8.7 (L) 12.0 - 16.0 g/dL Final   09/22/2023 CANCELED       Comment:     Result canceled by the ancillary.     Hematocrit   Date  Value Ref Range Status   10/24/2023 29.2 (L) 36.0 - 46.0 % Final   09/23/2023 24.4 (L) 36.0 - 46.0 % Final   09/22/2023 25.3 (L) 36.0 - 46.0 % Final   09/22/2023 CANCELED       Comment:     Result canceled by the ancillary.     MCV   Date/Time Value Ref Range Status   10/24/2023 11:03 AM 72 (L) 80 - 100 fL Final   09/23/2023 08:51 AM 77 (L) 80 - 100 fL Final   09/22/2023 08:58 AM 79 (L) 80 - 100 fL Final   09/22/2023 05:24 AM CANCELED       Comment:     Result canceled by the ancillary.     MCH   Date/Time Value Ref Range Status   10/24/2023 11:03 AM 23.6 (L) 26.0 - 34.0 pg Final   08/12/2023 03:07 PM 23.7 (L) 26 - 34 PG Final     MCHC   Date/Time Value Ref Range Status   10/24/2023 11:03 AM 32.9 32.0 - 36.0 g/dL Final   09/23/2023 08:51 AM 33.6 32.0 - 36.0 g/dL Final   09/22/2023 08:58 AM 34.4 32.0 - 36.0 g/dL Final   09/22/2023 05:24 AM CANCELED       Comment:     Result canceled by the ancillary.     RDW   Date/Time Value Ref Range Status   10/24/2023 11:03 AM 29.2 (H) 11.5 - 14.5 % Final   09/23/2023 08:51 AM 26.3 (H) 11.5 - 14.5 % Final   09/22/2023 08:58 AM 26.4 (H) 11.5 - 14.5 % Final   09/22/2023 05:24 AM CANCELED       Comment:     Result canceled by the ancillary.     Platelets   Date/Time Value Ref Range Status   10/24/2023 11:03  150 - 450 x10*3/uL Final   09/23/2023 08:51  150 - 450 x10E9/L Final   09/22/2023 08:58  150 - 450 x10E9/L Final   09/22/2023 05:24 AM CANCELED       Comment:     Result canceled by the ancillary.     MPV   Date/Time Value Ref Range Status   10/24/2023 11:03 AM 9.3 7.5 - 11.5 fL Final   08/12/2023 03:07 PM 9.4 7.0 - 12.6 CU Final     Neutrophils %   Date/Time Value Ref Range Status   10/24/2023 11:03 AM 72.1 40.0 - 80.0 % Final   09/22/2023 05:24 AM CANCELED       Comment:     Result canceled by the ancillary.   09/21/2023 07:15 AM CANCELED       Comment:     Result canceled by the ancillary.   09/18/2023 10:04 AM 76.1 40.0 - 80.0 % Final     Immature  Granulocytes %, Automated   Date/Time Value Ref Range Status   10/24/2023 11:03 AM 0.4 0.0 - 0.9 % Final     Comment:     Immature Granulocyte Count (IG) includes promyelocytes, myelocytes and metamyelocytes but does not include bands. Percent differential counts (%) should be interpreted in the context of the absolute cell counts (cells/UL).   09/23/2023 08:51 AM 0.3 0.0 - 0.9 % Final     Comment:      Immature Granulocyte Count (IG) includes promyelocytes,    myelocytes and metamyelocytes but does not include bands.   Percent differential counts (%) should be interpreted in the   context of the absolute cell counts (cells/L).     09/22/2023 08:58 AM 0.4 0.0 - 0.9 % Final     Comment:      Immature Granulocyte Count (IG) includes promyelocytes,    myelocytes and metamyelocytes but does not include bands.   Percent differential counts (%) should be interpreted in the   context of the absolute cell counts (cells/L).     09/22/2023 05:24 AM CANCELED       Comment:      Immature Granulocyte Count (IG) includes promyelocytes,    myelocytes and metamyelocytes but does not include bands.   Percent differential counts (%) should be interpreted in the   context of the absolute cell counts (cells/L).    Result canceled by the ancillary.       Lymphocytes %   Date/Time Value Ref Range Status   10/24/2023 11:03 AM 18.6 13.0 - 44.0 % Final   09/23/2023 08:51 AM 27.4 13.0 - 44.0 % Final   09/22/2023 08:58 AM 20.9 13.0 - 44.0 % Final   09/22/2023 05:24 AM CANCELED       Comment:     Result canceled by the ancillary.   09/21/2023 01:37 PM 20.0 13.0 - 44.0 % Final     Monocytes %   Date/Time Value Ref Range Status   10/24/2023 11:03 AM 7.1 2.0 - 10.0 % Final   09/23/2023 08:51 AM 8.5 2.0 - 10.0 % Final   09/22/2023 08:58 AM 4.3 2.0 - 10.0 % Final   09/22/2023 05:24 AM CANCELED       Comment:     Result canceled by the ancillary.   09/21/2023 01:37 PM 2.6 2.0 - 10.0 % Final     Eosinophils %   Date/Time Value Ref Range Status    10/24/2023 11:03 AM 1.6 0.0 - 6.0 % Final   09/23/2023 08:51 AM 3.4 0.0 - 6.0 % Final   09/22/2023 08:58 AM 4.4 0.0 - 6.0 % Final   09/22/2023 05:24 AM CANCELED       Comment:     Result canceled by the ancillary.   09/21/2023 01:37 PM 0.0 0.0 - 6.0 % Final     Basophils %   Date/Time Value Ref Range Status   10/24/2023 11:03 AM 0.2 0.0 - 2.0 % Final   09/23/2023 08:51 AM 0.0 0.0 - 2.0 % Final   09/22/2023 08:58 AM 0.0 0.0 - 2.0 % Final   09/22/2023 05:24 AM CANCELED       Comment:     Result canceled by the ancillary.   09/21/2023 01:37 PM 0.0 0.0 - 2.0 % Final     Neutrophils Absolute   Date/Time Value Ref Range Status   10/24/2023 11:03 AM 9.27 (H) 1.20 - 7.70 x10*3/uL Final     Comment:     Percent differential counts (%) should be interpreted in the context of the absolute cell counts (cells/uL).   09/22/2023 05:24 AM CANCELED       Comment:     Result canceled by the ancillary.   09/21/2023 07:15 AM CANCELED       Comment:     Result canceled by the ancillary.   09/18/2023 10:04 AM 7.46 1.20 - 7.70 x10E9/L Final     Immature Granulocytes Absolute, Automated   Date/Time Value Ref Range Status   10/24/2023 11:03 AM 0.05 0.00 - 0.70 x10*3/uL Final   08/12/2023 03:07 PM 0.12 (H) 0.0 - 0.1 K/UL Final     Lymphocytes Absolute   Date/Time Value Ref Range Status   10/24/2023 11:03 AM 2.39 1.20 - 4.80 x10*3/uL Final   09/22/2023 05:24 AM CANCELED       Comment:     Result canceled by the ancillary.   09/21/2023 07:15 AM CANCELED       Comment:     Result canceled by the ancillary.   09/18/2023 10:04 AM 1.41 1.20 - 4.80 x10E9/L Final     Monocytes Absolute   Date/Time Value Ref Range Status   10/24/2023 11:03 AM 0.91 0.10 - 1.00 x10*3/uL Final   09/22/2023 05:24 AM CANCELED       Comment:     Result canceled by the ancillary.   09/21/2023 07:15 AM CANCELED       Comment:     Result canceled by the ancillary.   09/18/2023 10:04 AM 0.74 0.10 - 1.00 x10E9/L Final     Eosinophils Absolute   Date/Time Value Ref Range  "Status   10/24/2023 11:03 AM 0.21 0.00 - 0.70 x10*3/uL Final   09/23/2023 08:51 AM 0.31 0.00 - 0.70 x10E9/L Final   09/22/2023 08:58 AM 0.35 0.00 - 0.70 x10E9/L Final   09/22/2023 05:24 AM CANCELED       Comment:     Result canceled by the ancillary.   09/21/2023 01:37 PM 0.00 0.00 - 0.70 x10E9/L Final     Basophils Absolute   Date/Time Value Ref Range Status   10/24/2023 11:03 AM 0.02 0.00 - 0.10 x10*3/uL Final     Comment:     Automated WBC differential has been confirmed by manual smear.   09/23/2023 08:51 AM 0.00 0.00 - 0.10 x10E9/L Final   09/22/2023 08:58 AM 0.00 0.00 - 0.10 x10E9/L Final   09/22/2023 05:24 AM CANCELED       Comment:     Result canceled by the ancillary.   09/21/2023 01:37 PM 0.00 0.00 - 0.10 x10E9/L Final       No components found for: \"PT\"  aPTT   Date/Time Value Ref Range Status   09/23/2023 08:51 AM 30 27 - 38 sec Final     Comment:     Note new reference range as of 6/20/2023 at 10:00am.   09/22/2023 08:58 AM 29 27 - 38 sec Final     Comment:     Note new reference range as of 6/20/2023 at 10:00am.   09/21/2023 07:15 AM CANCELED       Comment:     Note new reference range as of 6/20/2023 at 10:00am.    Result canceled by the ancillary.         Assessment/Plan       [facility]    - VSS  - labs near baseline, no indication of acute vasoocclusive crisis, pain likely 2/2 AVN   - careplan available  - Dilaudid 0.5mg x1, dose increased per care plan: Dilaudid 1mg x2  - Flexeril 10mg once for AVN pain  - Benadryl 25 mg once for opioid induced pruritus   - 650mg tylenol for mild pain  - Zofran as needed for opioid induced nausea     Dispo  - pt discharged home in stable condition   - Instructed to follow-up with sickle cell team as schedule  - Instructed to return to [facility] or ED for worsening pain        "

## 2023-10-25 LAB
ANA PATTERN: ABNORMAL
ANA SER QL HEP2 SUBST: POSITIVE
ANA TITR SER IF: ABNORMAL {TITER}
CENTROMERE B AB SER-ACNC: <0.2 AI
CHROMATIN AB SERPL-ACNC: <0.2 AI
DSDNA AB SER-ACNC: 16 IU/ML
ENA JO1 AB SER QL IA: <0.2 AI
ENA RNP AB SER IA-ACNC: <0.2 AI
ENA SCL70 AB SER QL IA: <0.2 AI
ENA SM AB SER IA-ACNC: <0.2 AI
ENA SM+RNP AB SER QL IA: <0.2 AI
ENA SS-A AB SER IA-ACNC: <0.2 AI
ENA SS-B AB SER IA-ACNC: <0.2 AI
RIBOSOMAL P AB SER-ACNC: <0.2 AI

## 2023-10-26 ENCOUNTER — TELEPHONE (OUTPATIENT)
Dept: HEMATOLOGY/ONCOLOGY | Facility: HOSPITAL | Age: 53
End: 2023-10-26

## 2023-10-26 ENCOUNTER — APPOINTMENT (OUTPATIENT)
Dept: RADIOLOGY | Facility: HOSPITAL | Age: 53
DRG: 812 | End: 2023-10-26
Payer: COMMERCIAL

## 2023-10-26 ENCOUNTER — HOSPITAL ENCOUNTER (INPATIENT)
Facility: HOSPITAL | Age: 53
LOS: 6 days | Discharge: HOME | DRG: 812 | End: 2023-11-01
Attending: INTERNAL MEDICINE | Admitting: INTERNAL MEDICINE
Payer: COMMERCIAL

## 2023-10-26 DIAGNOSIS — I27.20 PULMONARY HYPERTENSION (MULTI): ICD-10-CM

## 2023-10-26 DIAGNOSIS — D57.09 SICKLE CELL DISEASE WITH CRISIS AND OTHER COMPLICATION (MULTI): ICD-10-CM

## 2023-10-26 DIAGNOSIS — D57.00 SICKLE CELL CRISIS (MULTI): Primary | ICD-10-CM

## 2023-10-26 DIAGNOSIS — M79.89 SWELLING OF UPPER EXTREMITY: ICD-10-CM

## 2023-10-26 PROBLEM — D57.1 SICKLE CELL ANEMIA WITHOUT CRISIS (MULTI): Status: ACTIVE | Noted: 2023-10-26

## 2023-10-26 LAB
ABO GROUP (TYPE) IN BLOOD: NORMAL
ALBUMIN SERPL BCP-MCNC: 3.6 G/DL (ref 3.4–5)
ALP SERPL-CCNC: 118 U/L (ref 33–110)
ALT SERPL W P-5'-P-CCNC: 7 U/L (ref 7–45)
AMPHETAMINES UR QL SCN: NORMAL
ANION GAP SERPL CALC-SCNC: 13 MMOL/L (ref 10–20)
ANTIBODY SCREEN: NORMAL
APTT PPP: 27 SECONDS (ref 27–38)
AST SERPL W P-5'-P-CCNC: 13 U/L (ref 9–39)
BARBITURATES UR QL SCN: NORMAL
BASOPHILS # BLD AUTO: 0.04 X10*3/UL (ref 0–0.1)
BASOPHILS NFR BLD AUTO: 0.3 %
BILIRUB SERPL-MCNC: 1.7 MG/DL (ref 0–1.2)
BUN SERPL-MCNC: 27 MG/DL (ref 6–23)
BZE UR QL SCN: NORMAL
CALCIUM SERPL-MCNC: 8.7 MG/DL (ref 8.6–10.6)
CANNABINOIDS UR QL SCN: NORMAL
CHLORIDE SERPL-SCNC: 103 MMOL/L (ref 98–107)
CO2 SERPL-SCNC: 27 MMOL/L (ref 21–32)
CREAT SERPL-MCNC: 2.46 MG/DL (ref 0.5–1.05)
CREAT UR-MCNC: 311.3 MG/DL (ref 20–320)
DACRYOCYTES BLD QL SMEAR: NORMAL
EOSINOPHIL # BLD AUTO: 0.08 X10*3/UL (ref 0–0.7)
EOSINOPHIL NFR BLD AUTO: 0.6 %
ERYTHROCYTE [DISTWIDTH] IN BLOOD BY AUTOMATED COUNT: 30.2 % (ref 11.5–14.5)
GFR SERPL CREATININE-BSD FRML MDRD: 23 ML/MIN/1.73M*2
GLUCOSE SERPL-MCNC: 100 MG/DL (ref 74–99)
HCT VFR BLD AUTO: 27.2 % (ref 36–46)
HEMOGLOBIN A2: 3.1 % (ref 2–3.5)
HEMOGLOBIN A: 43 % (ref 95.8–98)
HEMOGLOBIN C: 26.4 %
HEMOGLOBIN F: 0.4 % (ref 0–2)
HEMOGLOBIN IDENTIFICATION INTERPRETATION: ABNORMAL
HEMOGLOBIN S: 27.1 %
HGB BLD-MCNC: 8.5 G/DL (ref 12–16)
HGB RETIC QN: 26 PG (ref 28–38)
HOWELL-JOLLY BOD BLD QL SMEAR: PRESENT
HYPOCHROMIA BLD QL SMEAR: NORMAL
IMM GRANULOCYTES # BLD AUTO: 0.07 X10*3/UL (ref 0–0.7)
IMM GRANULOCYTES NFR BLD AUTO: 0.5 % (ref 0–0.9)
IMMATURE RETIC FRACTION: 63.5 %
INR PPP: 1.1 (ref 0.9–1.1)
LDH SERPL L TO P-CCNC: 178 U/L (ref 84–246)
LYMPHOCYTES # BLD AUTO: 1.96 X10*3/UL (ref 1.2–4.8)
LYMPHOCYTES NFR BLD AUTO: 14.6 %
MCH RBC QN AUTO: 23.4 PG (ref 26–34)
MCHC RBC AUTO-ENTMCNC: 31.3 G/DL (ref 32–36)
MCV RBC AUTO: 75 FL (ref 80–100)
MONOCYTES # BLD AUTO: 1.14 X10*3/UL (ref 0.1–1)
MONOCYTES NFR BLD AUTO: 8.5 %
NEUTROPHILS # BLD AUTO: 10.16 X10*3/UL (ref 1.2–7.7)
NEUTROPHILS NFR BLD AUTO: 75.5 %
NRBC BLD-RTO: 0.4 /100 WBCS (ref 0–0)
PAPPENHEIMER BOD BLD QL SMEAR: PRESENT
PATH REVIEW-HGB IDENTIFICATION: ABNORMAL
PCP UR QL SCN: NORMAL
PLATELET # BLD AUTO: 337 X10*3/UL (ref 150–450)
PMV BLD AUTO: 9.4 FL (ref 7.5–11.5)
POLYCHROMASIA BLD QL SMEAR: NORMAL
POTASSIUM SERPL-SCNC: 4.9 MMOL/L (ref 3.5–5.3)
PROT SERPL-MCNC: 6.6 G/DL (ref 6.4–8.2)
PROTHROMBIN TIME: 12.6 SECONDS (ref 9.8–12.8)
RBC # BLD AUTO: 3.64 X10*6/UL (ref 4–5.2)
RBC MORPH BLD: NORMAL
RETICS #: 0.01 X10*6/UL (ref 0.02–0.08)
RETICS/RBC NFR AUTO: 0.3 % (ref 0.5–2)
RH FACTOR (ANTIGEN D): NORMAL
SCHISTOCYTES BLD QL SMEAR: NORMAL
SODIUM SERPL-SCNC: 138 MMOL/L (ref 136–145)
STOMATOCYTES BLD QL SMEAR: NORMAL
TARGETS BLD QL SMEAR: NORMAL
WBC # BLD AUTO: 13.5 X10*3/UL (ref 4.4–11.3)

## 2023-10-26 PROCEDURE — 36556 INSERT NON-TUNNEL CV CATH: CPT | Performed by: STUDENT IN AN ORGANIZED HEALTH CARE EDUCATION/TRAINING PROGRAM

## 2023-10-26 PROCEDURE — 86850 RBC ANTIBODY SCREEN: CPT | Performed by: PHYSICIAN ASSISTANT

## 2023-10-26 PROCEDURE — 85730 THROMBOPLASTIN TIME PARTIAL: CPT | Performed by: PHYSICIAN ASSISTANT

## 2023-10-26 PROCEDURE — 1200000002 HC GENERAL ROOM WITH TELEMETRY DAILY

## 2023-10-26 PROCEDURE — C1894 INTRO/SHEATH, NON-LASER: HCPCS

## 2023-10-26 PROCEDURE — 86902 BLOOD TYPE ANTIGEN DONOR EA: CPT

## 2023-10-26 PROCEDURE — 06HY33Z INSERTION OF INFUSION DEVICE INTO LOWER VEIN, PERCUTANEOUS APPROACH: ICD-10-PCS | Performed by: INTERNAL MEDICINE

## 2023-10-26 PROCEDURE — 2720000007 HC OR 272 NO HCPCS

## 2023-10-26 PROCEDURE — C1769 GUIDE WIRE: HCPCS

## 2023-10-26 PROCEDURE — 83615 LACTATE (LD) (LDH) ENZYME: CPT | Performed by: PHYSICIAN ASSISTANT

## 2023-10-26 PROCEDURE — 1270000001 HC SEMI-PRIVATE ONCOLOGY ROOM DAILY

## 2023-10-26 PROCEDURE — 85045 AUTOMATED RETICULOCYTE COUNT: CPT | Performed by: PHYSICIAN ASSISTANT

## 2023-10-26 PROCEDURE — G0378 HOSPITAL OBSERVATION PER HR: HCPCS

## 2023-10-26 PROCEDURE — 2500000004 HC RX 250 GENERAL PHARMACY W/ HCPCS (ALT 636 FOR OP/ED): Performed by: PHYSICIAN ASSISTANT

## 2023-10-26 PROCEDURE — 77001 FLUOROGUIDE FOR VEIN DEVICE: CPT | Performed by: STUDENT IN AN ORGANIZED HEALTH CARE EDUCATION/TRAINING PROGRAM

## 2023-10-26 PROCEDURE — 80053 COMPREHEN METABOLIC PANEL: CPT | Performed by: PHYSICIAN ASSISTANT

## 2023-10-26 PROCEDURE — 86922 COMPATIBILITY TEST ANTIGLOB: CPT

## 2023-10-26 PROCEDURE — 80358 DRUG SCREENING METHADONE: CPT | Performed by: PHYSICIAN ASSISTANT

## 2023-10-26 PROCEDURE — C1752 CATH,HEMODIALYSIS,SHORT-TERM: HCPCS

## 2023-10-26 PROCEDURE — 36556 INSERT NON-TUNNEL CV CATH: CPT | Mod: GC | Performed by: STUDENT IN AN ORGANIZED HEALTH CARE EDUCATION/TRAINING PROGRAM

## 2023-10-26 PROCEDURE — 80307 DRUG TEST PRSMV CHEM ANLYZR: CPT | Performed by: PHYSICIAN ASSISTANT

## 2023-10-26 PROCEDURE — 36430 TRANSFUSION BLD/BLD COMPNT: CPT

## 2023-10-26 PROCEDURE — 2780000003 HC OR 278 NO HCPCS

## 2023-10-26 PROCEDURE — 36415 COLL VENOUS BLD VENIPUNCTURE: CPT | Performed by: PHYSICIAN ASSISTANT

## 2023-10-26 PROCEDURE — 76937 US GUIDE VASCULAR ACCESS: CPT | Performed by: STUDENT IN AN ORGANIZED HEALTH CARE EDUCATION/TRAINING PROGRAM

## 2023-10-26 PROCEDURE — 85025 COMPLETE CBC W/AUTO DIFF WBC: CPT | Performed by: PHYSICIAN ASSISTANT

## 2023-10-26 PROCEDURE — 2500000001 HC RX 250 WO HCPCS SELF ADMINISTERED DRUGS (ALT 637 FOR MEDICARE OP): Performed by: PHYSICIAN ASSISTANT

## 2023-10-26 PROCEDURE — 99223 1ST HOSP IP/OBS HIGH 75: CPT | Performed by: PHYSICIAN ASSISTANT

## 2023-10-26 RX ORDER — FUROSEMIDE 20 MG/1
20 TABLET ORAL EVERY OTHER DAY
Status: DISCONTINUED | OUTPATIENT
Start: 2023-10-27 | End: 2023-10-30

## 2023-10-26 RX ORDER — POLYETHYLENE GLYCOL 3350 17 G/17G
17 POWDER, FOR SOLUTION ORAL 2 TIMES DAILY PRN
Status: DISCONTINUED | OUTPATIENT
Start: 2023-10-26 | End: 2023-11-01 | Stop reason: HOSPADM

## 2023-10-26 RX ORDER — METOPROLOL TARTRATE 25 MG/1
25 TABLET, FILM COATED ORAL 2 TIMES DAILY
Status: DISCONTINUED | OUTPATIENT
Start: 2023-10-26 | End: 2023-11-01 | Stop reason: HOSPADM

## 2023-10-26 RX ORDER — HYDROMORPHONE HYDROCHLORIDE 1 MG/ML
0.6 INJECTION, SOLUTION INTRAMUSCULAR; INTRAVENOUS; SUBCUTANEOUS
Status: DISCONTINUED | OUTPATIENT
Start: 2023-10-26 | End: 2023-10-27

## 2023-10-26 RX ORDER — ONDANSETRON 4 MG/1
4 TABLET, FILM COATED ORAL EVERY 8 HOURS PRN
Status: DISCONTINUED | OUTPATIENT
Start: 2023-10-26 | End: 2023-11-01 | Stop reason: HOSPADM

## 2023-10-26 RX ORDER — FLUTICASONE PROPIONATE 50 MCG
2 SPRAY, SUSPENSION (ML) NASAL DAILY
Status: DISCONTINUED | OUTPATIENT
Start: 2023-10-26 | End: 2023-11-01 | Stop reason: HOSPADM

## 2023-10-26 RX ORDER — HYDROMORPHONE HYDROCHLORIDE 1 MG/ML
1 INJECTION, SOLUTION INTRAMUSCULAR; INTRAVENOUS; SUBCUTANEOUS ONCE
Status: DISCONTINUED | OUTPATIENT
Start: 2023-10-26 | End: 2023-10-26

## 2023-10-26 RX ORDER — ALBUTEROL SULFATE 90 UG/1
2 AEROSOL, METERED RESPIRATORY (INHALATION) EVERY 4 HOURS PRN
Status: DISCONTINUED | OUTPATIENT
Start: 2023-10-26 | End: 2023-11-01 | Stop reason: HOSPADM

## 2023-10-26 RX ORDER — DULOXETIN HYDROCHLORIDE 20 MG/1
20 CAPSULE, DELAYED RELEASE ORAL DAILY
Status: DISCONTINUED | OUTPATIENT
Start: 2023-10-26 | End: 2023-11-01 | Stop reason: HOSPADM

## 2023-10-26 RX ORDER — DOCUSATE SODIUM 100 MG/1
100 CAPSULE, LIQUID FILLED ORAL 2 TIMES DAILY PRN
Status: DISCONTINUED | OUTPATIENT
Start: 2023-10-26 | End: 2023-11-01 | Stop reason: HOSPADM

## 2023-10-26 RX ORDER — ACETAMINOPHEN 325 MG/1
650 TABLET ORAL EVERY 6 HOURS PRN
Status: DISCONTINUED | OUTPATIENT
Start: 2023-10-26 | End: 2023-11-01 | Stop reason: HOSPADM

## 2023-10-26 RX ORDER — FOLIC ACID 1 MG/1
1 TABLET ORAL DAILY
Status: DISCONTINUED | OUTPATIENT
Start: 2023-10-26 | End: 2023-11-01 | Stop reason: HOSPADM

## 2023-10-26 RX ORDER — WARFARIN SODIUM 5 MG/1
5 TABLET ORAL DAILY
Status: DISCONTINUED | OUTPATIENT
Start: 2023-10-26 | End: 2023-11-01 | Stop reason: HOSPADM

## 2023-10-26 RX ORDER — OXYCODONE HYDROCHLORIDE 5 MG/1
10 TABLET ORAL EVERY 4 HOURS PRN
Status: DISCONTINUED | OUTPATIENT
Start: 2023-10-26 | End: 2023-10-30

## 2023-10-26 RX ORDER — DEXTROSE MONOHYDRATE AND SODIUM CHLORIDE 5; .45 G/100ML; G/100ML
75 INJECTION, SOLUTION INTRAVENOUS CONTINUOUS
Status: ACTIVE | OUTPATIENT
Start: 2023-10-26 | End: 2023-10-27

## 2023-10-26 RX ORDER — TRAZODONE HYDROCHLORIDE 50 MG/1
50 TABLET ORAL NIGHTLY
Status: DISCONTINUED | OUTPATIENT
Start: 2023-10-26 | End: 2023-11-01 | Stop reason: HOSPADM

## 2023-10-26 RX ORDER — LORATADINE 10 MG/1
10 TABLET ORAL DAILY PRN
Status: DISCONTINUED | OUTPATIENT
Start: 2023-10-26 | End: 2023-11-01 | Stop reason: HOSPADM

## 2023-10-26 RX ADMIN — HYDROMORPHONE HYDROCHLORIDE 0.6 MG: 1 INJECTION, SOLUTION INTRAMUSCULAR; INTRAVENOUS; SUBCUTANEOUS at 18:39

## 2023-10-26 RX ADMIN — Medication 2 L/MIN: at 22:37

## 2023-10-26 RX ADMIN — TRAZODONE HYDROCHLORIDE 50 MG: 50 TABLET ORAL at 21:33

## 2023-10-26 RX ADMIN — OXYCODONE HYDROCHLORIDE 10 MG: 5 TABLET ORAL at 16:47

## 2023-10-26 RX ADMIN — HYDROMORPHONE HYDROCHLORIDE 0.6 MG: 1 INJECTION, SOLUTION INTRAMUSCULAR; INTRAVENOUS; SUBCUTANEOUS at 21:33

## 2023-10-26 RX ADMIN — DEXTROSE AND SODIUM CHLORIDE 75 ML/HR: 5; 450 INJECTION, SOLUTION INTRAVENOUS at 16:28

## 2023-10-26 RX ADMIN — OXYCODONE HYDROCHLORIDE 10 MG: 5 TABLET ORAL at 23:39

## 2023-10-26 RX ADMIN — WARFARIN SODIUM 5 MG: 5 TABLET ORAL at 18:40

## 2023-10-26 SDOH — SOCIAL STABILITY: SOCIAL INSECURITY: ARE YOU OR HAVE YOU BEEN THREATENED OR ABUSED PHYSICALLY, EMOTIONALLY, OR SEXUALLY BY ANYONE?: NO

## 2023-10-26 SDOH — SOCIAL STABILITY: SOCIAL INSECURITY: HAS ANYONE EVER THREATENED TO HURT YOUR FAMILY OR YOUR PETS?: NO

## 2023-10-26 SDOH — HEALTH STABILITY: PHYSICAL HEALTH: ON AVERAGE, HOW MANY MINUTES DO YOU ENGAGE IN EXERCISE AT THIS LEVEL?: PATIENT DECLINED

## 2023-10-26 SDOH — ECONOMIC STABILITY: TRANSPORTATION INSECURITY
IN THE PAST 12 MONTHS, HAS LACK OF TRANSPORTATION KEPT YOU FROM MEETINGS, WORK, OR FROM GETTING THINGS NEEDED FOR DAILY LIVING?: PATIENT DECLINED

## 2023-10-26 SDOH — SOCIAL STABILITY: SOCIAL INSECURITY: DO YOU FEEL ANYONE HAS EXPLOITED OR TAKEN ADVANTAGE OF YOU FINANCIALLY OR OF YOUR PERSONAL PROPERTY?: NO

## 2023-10-26 SDOH — SOCIAL STABILITY: SOCIAL INSECURITY: DO YOU FEEL UNSAFE GOING BACK TO THE PLACE WHERE YOU ARE LIVING?: NO

## 2023-10-26 SDOH — SOCIAL STABILITY: SOCIAL INSECURITY: HAVE YOU HAD THOUGHTS OF HARMING ANYONE ELSE?: NO

## 2023-10-26 SDOH — ECONOMIC STABILITY: INCOME INSECURITY: IN THE LAST 12 MONTHS, WAS THERE A TIME WHEN YOU WERE NOT ABLE TO PAY THE MORTGAGE OR RENT ON TIME?: PATIENT REFUSED

## 2023-10-26 SDOH — SOCIAL STABILITY: SOCIAL INSECURITY: DOES ANYONE TRY TO KEEP YOU FROM HAVING/CONTACTING OTHER FRIENDS OR DOING THINGS OUTSIDE YOUR HOME?: NO

## 2023-10-26 SDOH — ECONOMIC STABILITY: TRANSPORTATION INSECURITY
IN THE PAST 12 MONTHS, HAS THE LACK OF TRANSPORTATION KEPT YOU FROM MEDICAL APPOINTMENTS OR FROM GETTING MEDICATIONS?: PATIENT DECLINED

## 2023-10-26 SDOH — HEALTH STABILITY: PHYSICAL HEALTH
ON AVERAGE, HOW MANY DAYS PER WEEK DO YOU ENGAGE IN MODERATE TO STRENUOUS EXERCISE (LIKE A BRISK WALK)?: PATIENT DECLINED

## 2023-10-26 SDOH — SOCIAL STABILITY: SOCIAL INSECURITY: ARE THERE ANY APPARENT SIGNS OF INJURIES/BEHAVIORS THAT COULD BE RELATED TO ABUSE/NEGLECT?: NO

## 2023-10-26 SDOH — SOCIAL STABILITY: SOCIAL INSECURITY: ABUSE: ADULT

## 2023-10-26 SDOH — ECONOMIC STABILITY: HOUSING INSECURITY
IN THE LAST 12 MONTHS, WAS THERE A TIME WHEN YOU DID NOT HAVE A STEADY PLACE TO SLEEP OR SLEPT IN A SHELTER (INCLUDING NOW)?: PATIENT REFUSED

## 2023-10-26 SDOH — SOCIAL STABILITY: SOCIAL INSECURITY: WERE YOU ABLE TO COMPLETE ALL THE BEHAVIORAL HEALTH SCREENINGS?: YES

## 2023-10-26 SDOH — ECONOMIC STABILITY: INCOME INSECURITY: HOW HARD IS IT FOR YOU TO PAY FOR THE VERY BASICS LIKE FOOD, HOUSING, MEDICAL CARE, AND HEATING?: PATIENT DECLINED

## 2023-10-26 ASSESSMENT — PAIN SCALES - GENERAL
PAINLEVEL_OUTOF10: 0 - NO PAIN
PAINLEVEL_OUTOF10: 9
PAINLEVEL_OUTOF10: 0 - NO PAIN
PAINLEVEL_OUTOF10: 0 - NO PAIN
PAINLEVEL_OUTOF10: 8
PAINLEVEL_OUTOF10: 8
PAINLEVEL_OUTOF10: 0 - NO PAIN
PAINLEVEL_OUTOF10: 8
PAINLEVEL_OUTOF10: 9

## 2023-10-26 ASSESSMENT — ENCOUNTER SYMPTOMS
CONSTIPATION: 0
COUGH: 0
SHORTNESS OF BREATH: 0
ABDOMINAL PAIN: 0
WEAKNESS: 0
DYSURIA: 0
APPETITE CHANGE: 0
DIARRHEA: 0
PALPITATIONS: 0
CHILLS: 0
FEVER: 0
NAUSEA: 0
NUMBNESS: 0

## 2023-10-26 ASSESSMENT — COGNITIVE AND FUNCTIONAL STATUS - GENERAL
MOBILITY SCORE: 23
CLIMB 3 TO 5 STEPS WITH RAILING: A LITTLE
CLIMB 3 TO 5 STEPS WITH RAILING: A LITTLE
DAILY ACTIVITIY SCORE: 24
CLIMB 3 TO 5 STEPS WITH RAILING: A LITTLE
DAILY ACTIVITIY SCORE: 24
PATIENT BASELINE BEDBOUND: NO
MOBILITY SCORE: 23
MOBILITY SCORE: 23
DAILY ACTIVITIY SCORE: 24

## 2023-10-26 ASSESSMENT — LIFESTYLE VARIABLES
HOW MANY STANDARD DRINKS CONTAINING ALCOHOL DO YOU HAVE ON A TYPICAL DAY: PATIENT DOES NOT DRINK
HOW OFTEN DO YOU HAVE A DRINK CONTAINING ALCOHOL: NEVER
AUDIT-C TOTAL SCORE: 0
SKIP TO QUESTIONS 9-10: 1
AUDIT-C TOTAL SCORE: 0
HOW OFTEN DO YOU HAVE 6 OR MORE DRINKS ON ONE OCCASION: NEVER

## 2023-10-26 ASSESSMENT — ACTIVITIES OF DAILY LIVING (ADL)
LACK_OF_TRANSPORTATION: PATIENT DECLINED
FEEDING YOURSELF: INDEPENDENT
HEARING - LEFT EAR: FUNCTIONAL
ADEQUATE_TO_COMPLETE_ADL: YES
PATIENT'S MEMORY ADEQUATE TO SAFELY COMPLETE DAILY ACTIVITIES?: YES
DRESSING YOURSELF: INDEPENDENT
JUDGMENT_ADEQUATE_SAFELY_COMPLETE_DAILY_ACTIVITIES: YES
WALKS IN HOME: INDEPENDENT
HEARING - RIGHT EAR: FUNCTIONAL
BATHING: INDEPENDENT
GROOMING: INDEPENDENT
TOILETING: INDEPENDENT
LACK_OF_TRANSPORTATION: PATIENT DECLINED

## 2023-10-26 ASSESSMENT — PATIENT HEALTH QUESTIONNAIRE - PHQ9
SUM OF ALL RESPONSES TO PHQ9 QUESTIONS 1 & 2: 0
1. LITTLE INTEREST OR PLEASURE IN DOING THINGS: NOT AT ALL
2. FEELING DOWN, DEPRESSED OR HOPELESS: NOT AT ALL

## 2023-10-26 ASSESSMENT — PAIN - FUNCTIONAL ASSESSMENT
PAIN_FUNCTIONAL_ASSESSMENT: 0-10

## 2023-10-26 NOTE — PRE-PROCEDURE NOTE
Interventional Radiology Preprocedure Note    Indication for procedure: There were no encounter diagnoses.    Relevant review of systems:  ROS negative, including shortness of breath, chest pain, abdominal pain, nausea, and vomiting.    Relevant Labs:   Lab Results   Component Value Date    CREATININE 1.30 (H) 10/24/2023    EGFR 49 (L) 10/24/2023    INR 1.1 10/26/2023    PROTIME 12.6 10/26/2023       Planned Sedation/Anesthesia: Minimal    Airway assessment: normal    Directed physical examination:    A&Ox3, normal respiratory effort, resting comfortably in bed.     Mallampati: II (hard and soft palate, upper portion of tonsils anduvula visible)    ASA Score: ASA 3 - Patient with moderate systemic disease with functional limitations    Benefits, risks and alternatives of procedure and planned sedation have been discussed with the patient and/or their representative. All questions answered and they agree to proceed.

## 2023-10-26 NOTE — H&P
History Of Present Illness  KIRSTY MARSHALL is a 54 y/o Female with PMH of hemoglobin SC disease (on exchange transfusions every four to six weeks), hx of SVC syndrome, recurrent DVT/PE (on lifelong Coumadin), new pHTN (6/2023), cauda equine with saddle numbness, fibromyalgia, Raynaud's disease, and CAN, who presents as a direct admit (9/20) for apheresis line placement and inpatient red blood cell exchange.   Patient follows with  sickle cell clinic and has had complications with recent outpatient exchanges; including SVT following line placement requiring admission and hypoxia/lethargy following exchange found to have new pHTN. Patient is being admitted for apheresis line placement followed by routine inpatient RBCEx and monitoring. States she has been doing well at home, managing her sickle cell with her home meds as prescribed. Denies any HA, fevers/chills, dizziness/lightheadedness, cough, congestion, rhinorrhea, sore throat, SOB, CP, palpitations, abdominal pain, n/v/d/c, melena, hematuria, dysuria, urgency/frequency, numbness/tingling, bruising/bleeding, or rashes. Denies any sick contacts or known COVID-19 exposure.       Past Medical History  She has a past medical history of Asthma, CHF (congestive heart failure) (CMS/[facility]), Chronic pain disorder, Compression of vein (02/19/2020), and Hypotension, unspecified (12/10/2020).  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 Has been evaluated for BMT but donor was considered to be too high risk.   - Recurrent DVT/PE with lifelong anticoagulation on coumadin  - Cauda equina with saddle numbness, history of bowel/bladder incontinence  - Chronic right hip pain, attributed radiographically to early AVN (sclerosis), with bilateral radicular sxs x 1-2 months.   - History of acute chest syndrome.   - Question of fibromyalgia.   - History of retinal detachment, likely  secondary to sickle cell disease.   - Obstructive Sleep and significant nocturnal hemoglobin desaturation (greater than 40% of sleep time with an SaO2 of less than 90%).   - Bilateral lower extremity edema, recurrent  - SVC syndrome: She was diagnosed with SVC syndrome, she had Bilateral Upper Extremity and Facial Swelling d/t partial filling defect within the SVC and a thrombus of the SVC complicated by bilateral Mediport catheters. Vasc med consulted, rec lifelong coumadin. She was on heparin drip bridge and coumadin was initiated on 1/16. INR 3.0 on 1/29/2019. - She is status post Venogram, SVC balloon angioplasty and Right mediport removal (1/15/20) and status post placement of left IJ temporary apharesis catheter, SVC angiogram, Balloon angioplasty of SVC/left brachiocephalic vein, Removal of left sided Mediport catheter (1/21/20). She was given IV diuretics lasix 2/26-29 for UE edema 2/2 SVC syndrome with edema. - Breast remain swollen recommended breast binder. SVC in Nov 2022 with stent placed.   - Syncopal episodes - referred to Neuro   - 11/22 hospitalization for SVC stricture; s/p SVC angioplasty, which was successful. FUV in IR 12/9/22. Bilateral Upper Extremity and Facial Swelling d/t partial filling defect within the SVC and a thrombus of the SVC complicated by bilateral Mediport catheters.   - Balloon removed via IR on April 2023.   - SVT event with conversion with 1 dose 6mg of adenosine on 5/5/23  - Admission June 2023 underwent RHC/LHC on 6/16 with mPA pressure 36, wedge 14, CI 4.2 and her coronary angiogram revealed no angiographic evidence of obstructive CAD. Dx of pulm HTN.     Family Hx: Lung disease, Cancer  Social Hx: Former smoker, denies drugs and alcohol  Allergies: NKDA    Surgical History    Social History  She reports that she has quit smoking. Her smoking use included cigarettes. She has been exposed to tobacco smoke. She has never used smokeless tobacco. She reports that she does not  currently use alcohol. She reports that she does not currently use drugs.     Allergies  Patient has no known allergies.    Review of Systems   Constitutional:  Negative for appetite change, chills and fever.   HENT:  Negative for congestion.    Respiratory:  Negative for cough and shortness of breath.    Cardiovascular:  Negative for chest pain, palpitations and leg swelling.   Gastrointestinal:  Negative for abdominal pain, constipation, diarrhea and nausea.   Genitourinary:  Negative for dysuria.   Neurological:  Negative for weakness and numbness.        Physical Exam     Last Recorded Vitals  Blood pressure 101/68, pulse (!) 112, temperature 36 °C (96.8 °F), resp. rate 18, SpO2 90 %.    Relevant Results  Scheduled medications  DULoxetine, 20 mg, oral, Daily  fluticasone, 2 spray, Each Nostril, Daily  folic acid, 1 mg, oral, Daily  [START ON 10/27/2023] furosemide, 20 mg, oral, Every other day  metoprolol tartrate, 25 mg, oral, BID  traZODone, 50 mg, oral, Nightly  warfarin, 5 mg, oral, Daily    Continuous medications     PRN medications  PRN medications: acetaminophen, albuterol, docusate sodium, docusate sodium **OR** polyethylene glycol, loratadine, ondansetron         Assessment/Plan   Principal Problem:    Sickle cell disease with crisis and other complication (CMS/Formerly [facility])  Active Problems:    Chronic pain    DVT (deep venous thrombosis) (CMS/Formerly [facility])    Sickle cell anemia without crisis (CMS/Formerly [facility])    KIRSTY MARSHALL is a 53 year old Female with 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 Pt has had complications with past outpatient exchanges; including SVT following line placement requiring admission and hypoxia/lethargy following exchange found  to have new pHTN. Patient is now admitted for observation following apheresis line placement and RBCEx. Plan for apheresis line placement possible today and routine RBCEx tomorrow 10/27.     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  - NOT in pain crisis, admitted for observation following line/red cell exchange  - Will order home Oxycodone 10mg q4hrs PRN severe pain if needed   - On admit started IV dilaudid 0.5mg q3hrs PRN breakthrough pain  - Bowel regimen for opioid-induced constipation   - Continue home Duloxetine 20mg daily, PO Zofran PRN, Folic Acid 1mg daily, and MVI daily  - Plan for apheresis line placement and RBCEx tomorrow 10/27  - Exchange labs done, HgbS 27.1%, HgbC 26.4%   - Telemetry ordered    # New pHTN   - 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 Yi and Dr. 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 On lifelong anticoagulation, DOAC discouraged due to high risk of clotting.   - Continue home Coumadin 5mg daily   - Caution with fluids, will hydrate X 12 hrs for CHARLES   - Follows with Coumadin clinic outpatient    # CHARLES on CKDII  - Baseline ~ SCr <2. Cr 2.4 on admit  - Follows with nephrology otpt  - Will give gentle IV hydration X12 hrs    - Recommend to avoid contrast and NSAIDS    # CAN  - Continue home CPAP   - Continue home Albuterol PRN    # H/O Cauda Equine syndrome  - Follows Dr. Delacruz    # DISPO:  - Full Code, confirmed on admit  - DC home with resumed O2 pending monitoring post red cell exchange   - FUV with sickle cell clinic on discharge         Leanne Gonzales PA-C

## 2023-10-26 NOTE — TELEPHONE ENCOUNTER
Called and spoke with patient and advised that I will update Dr. Whaley with patients concerns but she needs to discuss with staff on inpatient team. Patient verbalized understanding.

## 2023-10-26 NOTE — CARE PLAN
The patient's goals for the shift include      The clinical goals for the shift include Patient will remain safe and free from injury by end of shift 10/26/2023 at 1900    Patient had femoral line placed in IR in order to prepare for inpatient exchange tomorrow 10/27/23.     Problem: Pain - Adult  Goal: Verbalizes/displays adequate comfort level or baseline comfort level  Outcome: Progressing     Problem: Safety - Adult  Goal: Free from fall injury  Outcome: Progressing     Problem: Discharge Planning  Goal: Discharge to home or other facility with appropriate resources  Outcome: Progressing     Problem: Chronic Conditions and Co-morbidities  Goal: Patient's chronic conditions and co-morbidity symptoms are monitored and maintained or improved  Outcome: Progressing

## 2023-10-26 NOTE — Clinical Note
Right internal jugular Apheresis cath placed, no sedation. Dressing c/d/I. Pt to RPCU, report to RN

## 2023-10-26 NOTE — SIGNIFICANT EVENT
10/26/23 1044   Onset Documentation   Rapid Response Initiated By Radar auto page   Location/Room [facility] [place]   Pager Time 1044   Arrival Time 1050   Event End Time 1105   Level II Called No   Primary Reason for Call Radar auto page     Rapid Response Note    Radar auto-page received for a Radar score of 7 with the following vital signs: 36.0, 112, 18, 101/68, 90%.  Vital signs reviewed with RN.  Patient is here for a exchange due to sickle cell crisis.  When I arrived to the bedside oxygen saturation was 70%.  Placed patient on 4 L NC which increased SpO2 to 94%.  Patient denies any dyspnea.  RN to inquire about getting line placed for exchange today.  RN to contact Rapid Response with any future concerns or clinical deterioration.

## 2023-10-26 NOTE — POST-PROCEDURE NOTE
Interventional Radiology Brief Postprocedure Note    Attending: Dr. Ehsan MD    Assistant: None    Diagnosis: Sickle cell disease    Description of procedure: Successful placement of a right femoral temporary apheresis catheter. Line ready for use. Please refer to PACs for further details.    Anesthesia:  Local    Complications: None    Estimated Blood Loss: none    Medications  As of 10/26/23 1501      DULoxetine (Cymbalta) DR capsule 20 mg (mg) Total dose:  0 mg*   *Administration not included in total     Date/Time Rate/Dose/Volume Action       10/26/23  1100 *20 mg Missed               folic acid (Folvite) tablet 1 mg (mg) Total dose:  0 mg*   *Administration not included in total     Date/Time Rate/Dose/Volume Action       10/26/23  1100 *1 mg Missed               metoprolol tartrate (Lopressor) tablet 25 mg (mg) Total dose:  0 mg*   *Administration not included in total     Date/Time Rate/Dose/Volume Action       10/26/23  1100 *25 mg Missed               fluticasone (Flonase) nasal spray 2 spray (spray) Total dose:  0 spray* Dosing weight:  109   *Administration not included in total     Date/Time Rate/Dose/Volume Action       10/26/23  1100 *2 spray Missed               warfarin (Coumadin) tablet 5 mg (mg) Total dose:  Cannot be calculated*   *Administration dose not documented     Date/Time Rate/Dose/Volume Action       10/26/23  1128 *Not included in total Held by provider                   No specimens collected      See detailed result report with images in PACS.    The patient tolerated the procedure well without incident or complication and is in stable condition.

## 2023-10-27 ENCOUNTER — APPOINTMENT (OUTPATIENT)
Dept: RADIOLOGY | Facility: HOSPITAL | Age: 53
DRG: 812 | End: 2023-10-27
Payer: COMMERCIAL

## 2023-10-27 ENCOUNTER — APPOINTMENT (OUTPATIENT)
Dept: OTHER | Facility: HOSPITAL | Age: 53
DRG: 812 | End: 2023-10-27
Payer: COMMERCIAL

## 2023-10-27 ENCOUNTER — APPOINTMENT (OUTPATIENT)
Dept: OTHER | Facility: HOSPITAL | Age: 53
End: 2023-10-27
Payer: COMMERCIAL

## 2023-10-27 LAB
ALBUMIN SERPL BCP-MCNC: 3.4 G/DL (ref 3.4–5)
ALP SERPL-CCNC: 108 U/L (ref 33–110)
ALT SERPL W P-5'-P-CCNC: 3 U/L (ref 7–45)
ANION GAP SERPL CALC-SCNC: 11 MMOL/L (ref 10–20)
APPEARANCE UR: ABNORMAL
AST SERPL W P-5'-P-CCNC: 18 U/L (ref 9–39)
B2 GLYCOPROT1 IGA SER-ACNC: 1 U/ML
B2 GLYCOPROT1 IGG SER-ACNC: <1.4 U/ML
B2 GLYCOPROT1 IGM SER-ACNC: 0.3 U/ML
BILIRUB SERPL-MCNC: 1.2 MG/DL (ref 0–1.2)
BILIRUB UR STRIP.AUTO-MCNC: NEGATIVE MG/DL
BUN SERPL-MCNC: 31 MG/DL (ref 6–23)
C3 SERPL-MCNC: 128 MG/DL (ref 87–200)
C4 SERPL-MCNC: 30 MG/DL (ref 10–50)
CA-I BLD-SCNC: 0.86 MMOL/L (ref 1.1–1.33)
CALCIUM SERPL-MCNC: 8.5 MG/DL (ref 8.6–10.6)
CARDIOLIPIN IGA SERPL-ACNC: 1.2 APL U/ML
CARDIOLIPIN IGG SER IA-ACNC: <1.6 GPL U/ML
CARDIOLIPIN IGM SER IA-ACNC: 0.3 MPL U/ML
CHLORIDE SERPL-SCNC: 103 MMOL/L (ref 98–107)
CO2 SERPL-SCNC: 27 MMOL/L (ref 21–32)
COLOR UR: YELLOW
CREAT SERPL-MCNC: 2.37 MG/DL (ref 0.5–1.05)
CRP SERPL-MCNC: 0.98 MG/DL
ERYTHROCYTE [DISTWIDTH] IN BLOOD BY AUTOMATED COUNT: 25.2 % (ref 11.5–14.5)
ERYTHROCYTE [DISTWIDTH] IN BLOOD BY AUTOMATED COUNT: 30.8 % (ref 11.5–14.5)
ERYTHROCYTE [SEDIMENTATION RATE] IN BLOOD BY WESTERGREN METHOD: 16 MM/H (ref 0–30)
GFR SERPL CREATININE-BSD FRML MDRD: 24 ML/MIN/1.73M*2
GLUCOSE SERPL-MCNC: 106 MG/DL (ref 74–99)
GLUCOSE UR STRIP.AUTO-MCNC: NEGATIVE MG/DL
HCT VFR BLD AUTO: 26 % (ref 36–46)
HCT VFR BLD AUTO: 30.1 % (ref 36–46)
HGB BLD-MCNC: 8.1 G/DL (ref 12–16)
HGB BLD-MCNC: 9.7 G/DL (ref 12–16)
HGB RETIC QN: 19 PG (ref 28–38)
IMMATURE RETIC FRACTION: 9.2 %
KETONES UR STRIP.AUTO-MCNC: NEGATIVE MG/DL
LDH SERPL L TO P-CCNC: 214 U/L (ref 84–246)
LEUKOCYTE ESTERASE UR QL STRIP.AUTO: NEGATIVE
MCH RBC QN AUTO: 23.3 PG (ref 26–34)
MCH RBC QN AUTO: 24.7 PG (ref 26–34)
MCHC RBC AUTO-ENTMCNC: 31.2 G/DL (ref 32–36)
MCHC RBC AUTO-ENTMCNC: 32.2 G/DL (ref 32–36)
MCV RBC AUTO: 75 FL (ref 80–100)
MCV RBC AUTO: 77 FL (ref 80–100)
NITRITE UR QL STRIP.AUTO: NEGATIVE
NRBC BLD-RTO: 0.3 /100 WBCS (ref 0–0)
NRBC BLD-RTO: 6.9 /100 WBCS (ref 0–0)
PH UR STRIP.AUTO: 5 [PH]
PLATELET # BLD AUTO: 181 X10*3/UL (ref 150–450)
PLATELET # BLD AUTO: 300 X10*3/UL (ref 150–450)
PMV BLD AUTO: 10 FL (ref 7.5–11.5)
PMV BLD AUTO: 9.7 FL (ref 7.5–11.5)
POTASSIUM SERPL-SCNC: 4.8 MMOL/L (ref 3.5–5.3)
PROT SERPL-MCNC: 6.7 G/DL (ref 6.4–8.2)
PROT UR STRIP.AUTO-MCNC: NEGATIVE MG/DL
RBC # BLD AUTO: 3.48 X10*6/UL (ref 4–5.2)
RBC # BLD AUTO: 3.92 X10*6/UL (ref 4–5.2)
RBC # UR STRIP.AUTO: NEGATIVE /UL
RETICS #: 0.53 X10*6/UL (ref 0.02–0.08)
RETICS/RBC NFR AUTO: 15.3 % (ref 0.5–2)
SODIUM SERPL-SCNC: 136 MMOL/L (ref 136–145)
SP GR UR STRIP.AUTO: 1.01
UROBILINOGEN UR STRIP.AUTO-MCNC: <2 MG/DL
WBC # BLD AUTO: 11.7 X10*3/UL (ref 4.4–11.3)
WBC # BLD AUTO: 8.7 X10*3/UL (ref 4.4–11.3)

## 2023-10-27 PROCEDURE — 2500000001 HC RX 250 WO HCPCS SELF ADMINISTERED DRUGS (ALT 637 FOR MEDICARE OP): Performed by: PHYSICIAN ASSISTANT

## 2023-10-27 PROCEDURE — 2500000004 HC RX 250 GENERAL PHARMACY W/ HCPCS (ALT 636 FOR OP/ED): Performed by: PHYSICIAN ASSISTANT

## 2023-10-27 PROCEDURE — 76770 US EXAM ABDO BACK WALL COMP: CPT | Performed by: RADIOLOGY

## 2023-10-27 PROCEDURE — 84300 ASSAY OF URINE SODIUM: CPT | Performed by: PHYSICIAN ASSISTANT

## 2023-10-27 PROCEDURE — 80053 COMPREHEN METABOLIC PANEL: CPT | Performed by: PHYSICIAN ASSISTANT

## 2023-10-27 PROCEDURE — 85027 COMPLETE CBC AUTOMATED: CPT

## 2023-10-27 PROCEDURE — 6A551Z0 PHERESIS OF ERYTHROCYTES, MULTIPLE: ICD-10-PCS | Performed by: INTERNAL MEDICINE

## 2023-10-27 PROCEDURE — 85045 AUTOMATED RETICULOCYTE COUNT: CPT | Performed by: PHYSICIAN ASSISTANT

## 2023-10-27 PROCEDURE — 83615 LACTATE (LD) (LDH) ENZYME: CPT | Performed by: PHYSICIAN ASSISTANT

## 2023-10-27 PROCEDURE — 2500000001 HC RX 250 WO HCPCS SELF ADMINISTERED DRUGS (ALT 637 FOR MEDICARE OP)

## 2023-10-27 PROCEDURE — 2500000004 HC RX 250 GENERAL PHARMACY W/ HCPCS (ALT 636 FOR OP/ED)

## 2023-10-27 PROCEDURE — 99233 SBSQ HOSP IP/OBS HIGH 50: CPT | Performed by: PHYSICIAN ASSISTANT

## 2023-10-27 PROCEDURE — 36512 APHERESIS RBC: CPT | Performed by: PATHOLOGY

## 2023-10-27 PROCEDURE — 84133 ASSAY OF URINE POTASSIUM: CPT | Performed by: PHYSICIAN ASSISTANT

## 2023-10-27 PROCEDURE — 83021 HEMOGLOBIN CHROMOTOGRAPHY: CPT

## 2023-10-27 PROCEDURE — 85652 RBC SED RATE AUTOMATED: CPT | Performed by: PHYSICIAN ASSISTANT

## 2023-10-27 PROCEDURE — 36512 APHERESIS RBC: CPT

## 2023-10-27 PROCEDURE — 83020 HEMOGLOBIN ELECTROPHORESIS: CPT

## 2023-10-27 PROCEDURE — 82330 ASSAY OF CALCIUM: CPT

## 2023-10-27 PROCEDURE — G0378 HOSPITAL OBSERVATION PER HR: HCPCS

## 2023-10-27 PROCEDURE — 76770 US EXAM ABDO BACK WALL COMP: CPT

## 2023-10-27 PROCEDURE — 81003 URINALYSIS AUTO W/O SCOPE: CPT | Performed by: PHYSICIAN ASSISTANT

## 2023-10-27 PROCEDURE — 1200000002 HC GENERAL ROOM WITH TELEMETRY DAILY

## 2023-10-27 PROCEDURE — 1270000001 HC SEMI-PRIVATE ONCOLOGY ROOM DAILY

## 2023-10-27 PROCEDURE — 85027 COMPLETE CBC AUTOMATED: CPT | Performed by: PHYSICIAN ASSISTANT

## 2023-10-27 PROCEDURE — P9016 RBC LEUKOCYTES REDUCED: HCPCS

## 2023-10-27 PROCEDURE — 99222 1ST HOSP IP/OBS MODERATE 55: CPT | Performed by: INTERNAL MEDICINE

## 2023-10-27 RX ORDER — ACETAMINOPHEN 325 MG/1
650 TABLET ORAL ONCE
Status: COMPLETED | OUTPATIENT
Start: 2023-10-27 | End: 2023-10-27

## 2023-10-27 RX ORDER — DIPHENHYDRAMINE HYDROCHLORIDE 50 MG/ML
25 INJECTION INTRAMUSCULAR; INTRAVENOUS EVERY 5 MIN PRN
Status: DISCONTINUED | OUTPATIENT
Start: 2023-10-27 | End: 2023-10-27 | Stop reason: HOSPADM

## 2023-10-27 RX ORDER — HEPARIN SODIUM 1000 [USP'U]/ML
1000 INJECTION, SOLUTION INTRAVENOUS; SUBCUTANEOUS ONCE
Status: COMPLETED | OUTPATIENT
Start: 2023-10-27 | End: 2023-10-27

## 2023-10-27 RX ORDER — DEXTROSE MONOHYDRATE AND SODIUM CHLORIDE 5; .45 G/100ML; G/100ML
75 INJECTION, SOLUTION INTRAVENOUS CONTINUOUS
Status: ACTIVE | OUTPATIENT
Start: 2023-10-27 | End: 2023-10-28

## 2023-10-27 RX ORDER — BISMUTH SUBSALICYLATE 262 MG
1 TABLET,CHEWABLE ORAL DAILY
Status: ON HOLD | COMMUNITY
End: 2024-01-18 | Stop reason: WASHOUT

## 2023-10-27 RX ORDER — CALCIUM CARBONATE 200(500)MG
1500 TABLET,CHEWABLE ORAL EVERY 5 MIN PRN
Status: DISCONTINUED | OUTPATIENT
Start: 2023-10-27 | End: 2023-10-27 | Stop reason: HOSPADM

## 2023-10-27 RX ORDER — DIPHENHYDRAMINE HCL 25 MG
25 CAPSULE ORAL ONCE
Status: COMPLETED | OUTPATIENT
Start: 2023-10-27 | End: 2023-10-27

## 2023-10-27 RX ADMIN — SODIUM CHLORIDE 500 ML: 9 INJECTION, SOLUTION INTRAVENOUS at 13:30

## 2023-10-27 RX ADMIN — DULOXETINE HYDROCHLORIDE 20 MG: 20 CAPSULE, DELAYED RELEASE ORAL at 08:05

## 2023-10-27 RX ADMIN — HYDROMORPHONE HYDROCHLORIDE 0.6 MG: 1 INJECTION, SOLUTION INTRAMUSCULAR; INTRAVENOUS; SUBCUTANEOUS at 03:04

## 2023-10-27 RX ADMIN — ACETAMINOPHEN 650 MG: 325 TABLET ORAL at 09:45

## 2023-10-27 RX ADMIN — FOLIC ACID 1 MG: 1 TABLET ORAL at 08:05

## 2023-10-27 RX ADMIN — OXYCODONE HYDROCHLORIDE 10 MG: 5 TABLET ORAL at 20:37

## 2023-10-27 RX ADMIN — DIPHENHYDRAMINE HYDROCHLORIDE 25 MG: 25 CAPSULE ORAL at 09:45

## 2023-10-27 RX ADMIN — TRAZODONE HYDROCHLORIDE 50 MG: 50 TABLET ORAL at 20:37

## 2023-10-27 RX ADMIN — HEPARIN SODIUM 1000 UNITS: 1000 INJECTION, SOLUTION INTRAVENOUS; SUBCUTANEOUS at 12:12

## 2023-10-27 RX ADMIN — OXYCODONE HYDROCHLORIDE 10 MG: 5 TABLET ORAL at 06:07

## 2023-10-27 RX ADMIN — FUROSEMIDE 20 MG: 20 TABLET ORAL at 08:04

## 2023-10-27 RX ADMIN — DEXTROSE AND SODIUM CHLORIDE 75 ML/HR: 5; 450 INJECTION, SOLUTION INTRAVENOUS at 08:00

## 2023-10-27 RX ADMIN — HYDROMORPHONE HYDROCHLORIDE 0.6 MG: 1 INJECTION, SOLUTION INTRAMUSCULAR; INTRAVENOUS; SUBCUTANEOUS at 08:05

## 2023-10-27 RX ADMIN — METOPROLOL TARTRATE 25 MG: 25 TABLET, FILM COATED ORAL at 08:05

## 2023-10-27 ASSESSMENT — PAIN SCALES - GENERAL
PAINLEVEL_OUTOF10: 8
PAINLEVEL_OUTOF10: 7
PAINLEVEL_OUTOF10: 9
PAINLEVEL_OUTOF10: 8
PAINLEVEL_OUTOF10: 6
PAINLEVEL_OUTOF10: 6
PAINLEVEL_OUTOF10: 8
PAINLEVEL_OUTOF10: 9
PAINLEVEL_OUTOF10: 5 - MODERATE PAIN
PAINLEVEL_OUTOF10: 7
PAINLEVEL_OUTOF10: 6

## 2023-10-27 ASSESSMENT — COGNITIVE AND FUNCTIONAL STATUS - GENERAL
MOBILITY SCORE: 24
DAILY ACTIVITIY SCORE: 24

## 2023-10-27 ASSESSMENT — PAIN - FUNCTIONAL ASSESSMENT
PAIN_FUNCTIONAL_ASSESSMENT: 0-10

## 2023-10-27 NOTE — NURSING NOTE
Apheresis Nursing Note    Senait Narvaez is a 53 y.o. female presenting for Chronic Red Blood Cell exchange.    Pre-Procedure Assessment  Pre-Procedure Assessment  Level of Consciousness: Alert  Orientation Level: Oriented X4  Cognition: Appropriate judgement  Pain Score:  (managed by primary team)  Access Sites 'Okay to Use' Obtained: Yes  Access Site(s) Assessment: Yes  Estimated Replacement Volume: 1900  Vital Signs  Temp: 36.0C  Heart Rate: 73  Resp: 18  BP: 92/60  Medical Gas Therapy  SpO2: 96 %  Pulse Oximetry Type: Intermittent  Oximetry Probe Site Location: Finger  Patient Activity During SpO2 Measurement: At rest  Medical Gas Therapy: Supplemental oxygen  O2 Delivery Method: Nasal cannula  Procedure Information and Time Out  Procedure Type: Red blood cell exchange  Red Cell Diagnosis: Other (see comment) (chronic exchange program)  Target Hemoglobin S (HgB S) (g/dL): 26 g/dL (Combined hemoglobin S+C target)  Target Hematocrit (HCT) %: 28 %  Target Fraction of Cells Remaining (FCR): 50  Units Used: 6  Fluid Balance: 100%  Kit and Blood/Fluid Warmer Inspection: Tubing inspected prior to connection to patient  Apheresis Machine: Spectra Optia [id number]  Apheresis Machine PM in Date: Yes  Blood Warmer: Astotherm DNA [id number]  Blood Warmer PM in Date: Yes  RBC Exchange Time-Out Completed: Yes  Provider Time Out Completed with: Des  Time Out Completed on: 10/27/23  Time Out Completed at: 1025  Equipment and Disposables  Kit Type: Exchange kits  Kit Lot Number: [id number]  Kit Expiration Date: 09/01/25  ADC-A Used as Anticoagulant: Yes  ACD-A Lot #: [id number]  ACD-A Expiration Date: 06/01/25  9% Sodium Chloride Lot # (Liter): [id number]  9% Sodium Chloride Expiration Date (Liter): 12/31/24  Procedure Start  Start Time: 1040    Post-Procedure Assessment:  Post-Procedure  End Time: 1212  Waste Materials Collected for Research: No  Procedure Outcome: Treatment completed successfully  Adverse Event: No  Post-Procedure Line  Assessment: Patent  Post-Procedure Access Care : Loaded/flushed per order, Caps changed, 'Do Not Flush' label applied  Vital Signs  Temp: 36.2 °C  Heart Rate: 69  Resp: 18  BP: 103/61  Medical Gas Therapy  SpO2: 99 %  Pulse Oximetry Type: Intermittent  Oximetry Probe Site Location: Finger  Patient Activity During SpO2 Measurement: At rest  Medical Gas Therapy: Supplemental oxygen  O2 Delivery Method: Nasal cannula, 2L  Post-Procedure Patient Fluid Values   Replacement Fluid Intake (mL): 1734 mL  AC Infused (mL): 187 mL  Rinseback Intake (mL): 0 mL  Ca+ Solution Intake (mL): 0 mL  Other Intake (mL): 0 mL  Apheresis Intake Total (mL): 1921 mL  Removed During Apheresis Output (mL): 2030 mL  AC in Bag Output (mL): 107 mL  Samples Output (mL): 20 mL  Apheresis Output Total (mL): 1943 mL  Inlet Volume Processed (mL): 3884 mL  Net Difference (mL): -22 mL  Disposition  Patient's Condition at End of Procedure: Stable  Disposition: N/A - procedure performed at bedside  Report Given to:  Hilton and Dr. Nunes  $ Apheresis Charge: Red blood cell exchange    Victor Manuel Escalante RN

## 2023-10-27 NOTE — PROGRESS NOTES
"Senait Marshall is a 53 y.o. female on day 1 of admission presenting with Sickle cell disease with crisis and other complication (CMS/HCC).    Subjective   Seen this morning at bedside, still c/o pain all over, we dicussed her getting her exchange today. Denies chest pain, SOB, fever/chills.     Objective     Physical Exam    Last Recorded Vitals  Blood pressure 119/53, pulse 89, temperature 36.2 °C (97.2 °F), resp. rate 16, height 1.65 m (5' 4.96\"), weight 110 kg (241 lb 9.6 oz), SpO2 93 %.  Intake/Output last 3 Shifts:  I/O last 3 completed shifts:  In: 1379 (12.6 mL/kg) [I.V.:1379 (12.6 mL/kg)]  Out: 1 (0 mL/kg) [Urine:1 (0 mL/kg/hr)]  Weight: 109.6 kg     Relevant Results           This patient currently has cardiac telemetry ordered; if you would like to modify or discontinue the telemetry order, click here to go to the orders activity to modify/discontinue the order.    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 Pt has had complications with past outpatient exchanges; including SVT following line placement requiring admission and hypoxia/lethargy following exchange found to have new pHTN. Patient is now admitted for observation following apheresis line placement and RBCEx. Plan for apheresis line placement possible today and routine RBCEx tomorrow 10/27.      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  - NOT in pain crisis, admitted for observation following line/red cell exchange  - Will order home Oxycodone 10mg q4hrs PRN severe pain if needed   - On admit started IV dilaudid 0.5mg q3hrs PRN breakthrough pain  - Bowel regimen for opioid-induced constipation   - Continue home Duloxetine 20mg daily, PO Zofran PRN, Folic Acid 1mg daily, and MVI daily  - S/P apheresis line placement on 10/26, plan to remove prior to discharge   - Scheduled for RBCEx today 10/27  - Exchange labs done, HgbS 27.1%, HgbC 26.4%   - Cont Telemetry      # New pHTN   - 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 Yi and Dr. 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 On lifelong anticoagulation, DOAC discouraged due to high risk of clotting.   - Continue home Coumadin 5mg daily   - Caution with fluids, will  hydrate X 12 hrs for CHARLES   - Monitor with INR   - Follows with Coumadin clinic outpatient     # CHARLES on CKDII  - Baseline ~ SCr <2. Cr 2.4 on admit  - Follows with nephrology otpt  - Will give gentle IV hydration X12 hrs    - UA/urine culture, urine electrolyte ordered for eval  - Recommend to avoid contrast and NSAIDS     # CAN  - Continue home CPAP   - Continue home Albuterol PRN     # H/O Cauda Equine syndrome  - Follows Dr. Delacruz     # DISPO:  - Full Code, confirmed on admit  - DC home with resumed O2 pending monitoring post red cell exchange   - FUV with sickle cell clinic on discharge        I spent >60  minutes in the professional and overall care of this patient.      IRMA WilliamC

## 2023-10-27 NOTE — POST-PROCEDURE NOTE
This is a 53 y.o. female with Hemoglobin SC Disease currently on the chronic exchange program who underwent automated red blood cell exchange (RCE) with 6 RBC units with target HgbS+HgbC 26% and target HCT 28%.     I saw and evaluated the patient during the RCE.  The patient was resting in bed.  Vascular access functioned well.    WBC   Date/Time Value Ref Range Status   10/27/2023 03:21 AM 11.7 (H) 4.4 - 11.3 x10*3/uL Final     Hemoglobin   Date/Time Value Ref Range Status   10/27/2023 03:21 AM 8.1 (L) 12.0 - 16.0 g/dL Final     Hematocrit   Date/Time Value Ref Range Status   10/27/2023 03:21 AM 26.0 (L) 36.0 - 46.0 % Final     Platelets   Date/Time Value Ref Range Status   10/27/2023 03:21  150 - 450 x10*3/uL Final        POCT Calcium, Ionized   Date/Time Value Ref Range Status   10/24/2023 11:03 AM 1.23 1.1 - 1.33 mmol/L Final     Comment:     The performance characteristics of ionized calcium tested  in heparinized plasma or serum have been validated by the  individual  laboratory site where testing is performed.   Testing on heparinized plasma or serum is not approved by   the FDA; however, such approval is not necessary.        Hemoglobin S   Date/Time Value Ref Range Status   10/24/2023 11:03 AM 27.1 (H) <=0.0 % Final        Ferritin   Date/Time Value Ref Range Status   10/24/2023 11:03 AM 25 8 - 150 ng/mL Final        Pre-procedure vital signs at 10:23:  T: 36 C, HR: 73, RR: 18, BP: 92/60  Pulse oximetry: 96% on 2L O2 via [place].    Post-procedure vital signs at 12:12:  T: 36.2 C, HR: 69, RR: 18, BP: 103/61  Pulse oximetry: 98% on 2L O2 via [place].    Outcome: RCE completed without complications.    Assessment and Plan:  53 y.o. female with Hemoglobin SC Disease who underwent RCE as part of the chronic exchange program.    Draw post-procedure labs  Vascular access to be discontinued  by the clinical team post RCE completion.  No further RCE planned for this admission  Tentative next RCE  in 4-6 weeks.

## 2023-10-27 NOTE — CONSULTS
Reason For Consult  Concern for lupus    History Of Present Illness  Senait Narvaez is a 53 y.o. 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 Has been evaluated  for BMT but donor was considered to be too high risk.    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    History of acute chest syndrome.    Question of fibromyalgia.    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    Obstructive Sleep and significant nocturnal hemoglobin desaturation (greater than 40% of sleep time with an SaO2 of less than 90%).    Bilateral lower extremity edema, recurrent   SVC syndrome: She was diagnosed with SVC syndrome, she had Bilateral Upper Extremity and Facial Swelling d/t partial filling defect within the SVC and a thrombus of the SVC complicated by bilateral Mediport catheters. Vasc med consulted, rec lifelong  coumadin. She was on heparin drip bridge and coumadin was initiated on 1/16. INR 3.0 on 1/29/2019. She is status post Venogram, SVC balloon angioplasty and Right mediport removal (1/15/20) and status post placement of left IJ temporary apharesis catheter,  SVC angiogram, Balloon angioplasty of SVC/left brachiocephalic vein, Removal of left sided Mediport catheter (1/21/20). She was given IV diuretics lasix 2/26-29 for UE edema 2/2 SVC syndrome with edema. Breast remain swollen recommended breast binder.  SVC in Nov 2022 with stent placed.    Syncopal episodes - referred to Neuro    11/22 hospitalization for SVC stricture; s/p SVC angioplasty, which was successful.  FUV in IR 12/9/22.   Bilateral Upper Extremity and  Facial Swelling d/t partial filling defect within the  SVC and a thrombus of the SVC complicated by bilateral Mediport catheters.    Balloon removed via IR on April 2023.    SVT event with conversion with 1 dose 6mg of adenosine on 5/5/23       Patient has long standing history of generalized body aches  Says its been many years, it hurts all over  Sometimes her hands hurt  Sometimes she has morning stiffness for about an hour  Occasional swelling of fingers bilaterally  She is not very active, but says movement at times helps  She has long standing history of RP - self limited not on any meds  Denies rash/photsensitivity/eye redness/ alopecia/ oral or genial ulcers  Denies recurrent fevers, night sweats, weight has been stable  Denies GI symptoms  Says she is not short of breath  Denies family history of autoimmune disease    As aforementioned has several co morbid conditions  IT is not clear whether the DVTs she had initially were unprovoked, she is on chronic anticoagulation  Unable to tell me the clinical sitiuation that triggered DVTs or it was spontaneous  Patient keeps going back to sleep unable to get good history from her    She has recently been diagnosed with pulmonary hypertension  But rt Cath results were suggestive of possible Group 2 pulm hypertension and not pre cap pulmonary hypertension  She follows with cardiology/pulmonology colleages for that    She has children, alive and health  No prior pregnancy related issues that she can recall    Past Medical History  She has a past medical history of Asthma, CHF (congestive heart failure) (CMS/HCC), Chronic pain disorder, Compression of vein (02/19/2020), and Hypotension, unspecified (12/10/2020).          Surgical History  She has a past surgical history that includes Appendectomy (08/05/2013); Cholecystectomy (08/05/2013); Other surgical history (05/13/2014); Other surgical history (10/09/2014); Other surgical history (06/09/2014); MR angio head wo IV  contrast (8/16/2014); MR angio neck wo IV contrast (8/16/2014); IR CVC tunneled (3/13/2015); IR CVC tunneled (1/29/2016); IR CVC tunneled (1/17/2018); IR CVC tunneled (2/22/2018); IR CVC tunneled (3/22/2018); IR CVC tunneled (4/18/2018); IR CVC tunneled (5/16/2018); IR CVC tunneled (6/12/2018); US guided biopsy lymph node superficial (9/17/2019); CT guided percutaneous biopsy LYMPH node superficial (9/19/2019); IR CVC tunneled (1/21/2020); IR CVC tunneled (2/28/2020); IR CVC tunneled (4/10/2020); IR CVC tunneled (5/22/2020); IR CVC tunneled (6/19/2020); IR CVC tunneled (7/23/2020); IR CVC tunneled (9/4/2020); IR CVC tunneled (10/9/2020); IR CVC tunneled (11/13/2020); IR CVC tunneled (12/18/2020); IR CVC tunneled (1/22/2021); IR CVC tunneled (2/26/2021); IR CVC tunneled (4/2/2021); IR CVC tunneled (5/7/2021); IR CVC tunneled (6/11/2021); IR CVC tunneled (7/16/2021); IR CVC tunneled (8/20/2021); IR CVC tunneled (9/24/2021); IR CVC tunneled (10/29/2021); IR CVC tunneled (12/3/2021); IR CVC tunneled (1/7/2022); IR CVC tunneled (2/23/2022); IR CVC tunneled (3/25/2022); IR CVC tunneled (4/22/2022); IR CVC tunneled (6/3/2022); IR CVC tunneled (9/18/2017); IR CVC tunneled (10/30/2017); IR CVC tunneled (12/19/2017); IR CVC tunneled (1/6/2023); IR CVC tunneled (2/24/2023); IR CVC tunneled (7/8/2022); IR CVC tunneled (8/12/2022); IR CVC tunneled (9/16/2022); CT angio neck w and wo IV contrast (10/19/2022); IR CVC tunneled (10/20/2022); IR CVC tunneled (11/29/2022); IR CVC tunneled (3/31/2023); IR CVC tunneled (6/9/2023); IR CVC tunneled (7/20/2023); IR CVC tunneled (8/16/2023); IR CVC tunneled (9/21/2023); and IR CVC nontunneled (10/26/2023).     Social History  She reports that she has quit smoking. Her smoking use included cigarettes. She has been exposed to tobacco smoke. She has never used smokeless tobacco. She reports that she does not currently use alcohol. She reports that she does not currently use drugs.    Family  "History  Family History   Problem Relation Name Age of Onset    Diabetes Father      Gout Father      Sickle cell trait Father      Seizures Sister      Diabetes Other      Uterine cancer Other      Other (congenital blindness) Cousin          Allergies  Patient has no known allergies.    Review of Systems  As aforementioned all systems negative other than respiratory    Physical Exam  Physical Exam  General: Cooperative, in NAD   Eyes: Conjunctiva clear, sclera white, anicteric   Nose: No external deformity, no mucosal crusting or signs of bleeding   Throat/Mouth: Moist mucous membranes, normal dentition, no ulcers or lesions   Lungs: No respiratory distress; lungs CTAB; no wheezes, rales, rhonchi, or stridor   Heart: Normal S1 and S2; regular rate and rhythm; no murmurs, rubs, or gallops  Skin: No rashes, ulcers, tophi, or nodules noted  MSK:   Spine: Normal posture, no point tenderness to palpation, normal ROM  Upper Extremities:   Hands: No erythema, warmth, synovitis, or pain of the DIPs, PIPs, or MCPs; normal ROM  Wrists: No erythema, warmth, synovitis, or pain of the wrists; normal ROM  Elbows: No erythema, warmth, synovitis, or pain; normal ROM   Shoulders: No erythema, warmth, appreciable swelling, or pain; normal ROM   Lower Extremities:   Hips: No gross deformity, erythema, warmth, or pain; normal ROM   Knees: No erythema, warmth, swelling, or pain; normal ROM   Ankles: No erythema, warmth, synovitis, or pain; normal ROM  Feet: No gross deformity, erythema, or swelling; MTP squeeze negative bilaterally      Last Recorded Vitals  Blood pressure 100/52, pulse 68, temperature 35.8 °C (96.4 °F), temperature source Tympanic, resp. rate 16, height 1.65 m (5' 4.96\"), weight 110 kg (241 lb 9.6 oz), SpO2 100 %.    Relevant Results  Results for orders placed or performed during the hospital encounter of 10/26/23 (from the past 24 hour(s))   Opiate/Opioid/Benzo Prescription Compliance Screen   Result Value Ref Range "    Creatinine, Urine Random 311.3 20.0 - 320.0 mg/dL    Amphetamine Screen, Urine Presumptive Negative Presumptive Negative    Barbiturate Screen, Urine Presumptive Negative Presumptive Negative    Cannabinoid Screen, Urine Presumptive Negative Presumptive Negative    Cocaine Metabolite Screen, Urine Presumptive Negative Presumptive Negative    PCP Screen, Urine Presumptive Negative Presumptive Negative   CBC   Result Value Ref Range    WBC 11.7 (H) 4.4 - 11.3 x10*3/uL    nRBC 0.3 (H) 0.0 - 0.0 /100 WBCs    RBC 3.48 (L) 4.00 - 5.20 x10*6/uL    Hemoglobin 8.1 (L) 12.0 - 16.0 g/dL    Hematocrit 26.0 (L) 36.0 - 46.0 %    MCV 75 (L) 80 - 100 fL    MCH 23.3 (L) 26.0 - 34.0 pg    MCHC 31.2 (L) 32.0 - 36.0 g/dL    RDW 30.8 (H) 11.5 - 14.5 %    Platelets 300 150 - 450 x10*3/uL    MPV 10.0 7.5 - 11.5 fL   Reticulocytes   Result Value Ref Range    Retic % 15.3 (H) 0.5 - 2.0 %    Retic Absolute 0.532 (H) 0.018 - 0.083 x10*6/uL    Reticulocyte Hemoglobin 19 (L) 28 - 38 pg    Immature Retic fraction 9.2 <=16.0 %   Comprehensive Metabolic Panel   Result Value Ref Range    Glucose 106 (H) 74 - 99 mg/dL    Sodium 136 136 - 145 mmol/L    Potassium 4.8 3.5 - 5.3 mmol/L    Chloride 103 98 - 107 mmol/L    Bicarbonate 27 21 - 32 mmol/L    Anion Gap 11 10 - 20 mmol/L    Urea Nitrogen 31 (H) 6 - 23 mg/dL    Creatinine 2.37 (H) 0.50 - 1.05 mg/dL    eGFR 24 (L) >60 mL/min/1.73m*2    Calcium 8.5 (L) 8.6 - 10.6 mg/dL    Albumin 3.4 3.4 - 5.0 g/dL    Alkaline Phosphatase 108 33 - 110 U/L    Total Protein 6.7 6.4 - 8.2 g/dL    AST 18 9 - 39 U/L    Bilirubin, Total 1.2 0.0 - 1.2 mg/dL    ALT 3 (L) 7 - 45 U/L   Lactate Dehydrogenase   Result Value Ref Range     84 - 246 U/L   Sedimentation rate, automated   Result Value Ref Range    Sedimentation Rate 16 0 - 30 mm/h   CBC   Result Value Ref Range    WBC 8.7 4.4 - 11.3 x10*3/uL    nRBC 6.9 (H) 0.0 - 0.0 /100 WBCs    RBC 3.92 (L) 4.00 - 5.20 x10*6/uL    Hemoglobin 9.7 (L) 12.0 - 16.0 g/dL     Hematocrit 30.1 (L) 36.0 - 46.0 %    MCV 77 (L) 80 - 100 fL    MCH 24.7 (L) 26.0 - 34.0 pg    MCHC 32.2 32.0 - 36.0 g/dL    RDW 25.2 (H) 11.5 - 14.5 %    Platelets 181 150 - 450 x10*3/uL    MPV 9.7 7.5 - 11.5 fL   Calcium, Ionized   Result Value Ref Range    POCT Calcium, Ionized 0.86 (L) 1.1 - 1.33 mmol/L        Scheduled medications  DULoxetine, 20 mg, oral, Daily  fluticasone, 2 spray, Each Nostril, Daily  folic acid, 1 mg, oral, Daily  furosemide, 20 mg, oral, Every other day  metoprolol tartrate, 25 mg, oral, BID  traZODone, 50 mg, oral, Nightly  warfarin, 5 mg, oral, Daily      Continuous medications  dextrose 5%-0.45 % sodium chloride, 75 mL/hr, Last Rate: 75 mL/hr (10/27/23 0800)      PRN medications  PRN medications: acetaminophen, albuterol, docusate sodium, docusate sodium **OR** polyethylene glycol, loratadine, ondansetron, oxyCODONE, oxygen         Assessment/Plan     # Multiple morbidities  With background of Sickle cell disease    Has generalized body aches possibly related to pain centralization  Need to get more history from patient when she is more alert  To identifidy any hx of anixety/depression or PTSD  No clinical s/s of an autoimmune disorder  Lupus serology is not very impression  She was ISABELLE negative previously with mild titire dsDNA of no clinical significance  And currently has low titre ISABELLE with low titire dsDNA    Recommendations  - send C3/C4/ESR/CRP, full urinalysis, urine microscopy, 24 hour urinary protein  ( Urine PCR will not be reliable with low eGFR)  -Please request APS serology for history of recurrent VTEs  - Work up for CHARLES - She has elevated Cr since September - Needs full work up to determine the cause   Please send CK, SPEP, urine studies as requested. Possibly involve nephrology colleagues if CR does not improve with hydration.   - obtain US kidneys if not done before  Differential is wide - HbSC induced nephropathy/ Warfarin induced nephropathy are remote  possibilities  - Xray of HANDS bilateral 3 views  - our suspicion of SLE is low at this point, will review labs and make suggestions once results are available    Case seen and discussed with Dr Avtar MULLINS spent 45 minutes in the professional and overall care of this patient.      Miguel Ángel Rice MD

## 2023-10-27 NOTE — SIGNIFICANT EVENT
RAPID RESPONSE RN NOTE- RADAR 6   10/27/23 1318   Onset Documentation   Rapid Response Initiated By Radar auto page   Location/Room [facility]   Pager Time 1318   Arrival Time 1319   Event End Time 1330   Level II Called No   Primary Reason for Call Radar auto page  (RADAR 6)     VS 35.8, 67, 16, 79/55,100% 2 lpm NC.  POC and VS discussed with bedside RN in patients room.  Per RN the patient (Sickle cell) just finished an exchange- she has gotten hypotensive with exchanges in the past.  RICA Gonzales notified of BP - order for 500 ml NS bolus placed and initiated.  Patient to remain on division at this time- bedside RN to contact Rapid Response Team with any further concerns or patient deterioration.

## 2023-10-27 NOTE — CARE PLAN
Problem: Pain - Adult  Goal: Verbalizes/displays adequate comfort level or baseline comfort level  Outcome: Progressing     Problem: Safety - Adult  Goal: Free from fall injury  Outcome: Progressing     Problem: Chronic Conditions and Co-morbidities  Goal: Patient's chronic conditions and co-morbidity symptoms are monitored and maintained or improved  Outcome: Progressing

## 2023-10-27 NOTE — PROGRESS NOTES
Pharmacy Medication History Review    Senait Narvaez is a 53 y.o. female admitted for Sickle cell disease with crisis and other complication (CMS/[facility]). Pharmacy reviewed the patient's [id number] medications and allergies for accuracy.    The list below reflects the updated PTA list. Please review each medication in order reconciliation for additional clarification and justification.  Medications Prior to Admission   Medication Sig Dispense Refill Last Dose    albuterol 90 mcg/actuation inhaler Inhale 2 puffs every 4 hours if needed for shortness of breath or wheezing.   10/26/2023    docusate sodium (Colace) 100 mg capsule Take 1 capsule (100 mg) by mouth 2 times a day as needed for constipation.   10/26/2023    DULoxetine (Cymbalta) 20 mg DR capsule Take 1 capsule (20 mg) by mouth once daily.   10/26/2023    folic acid (Folvite) 1 mg tablet Take 1 tablet (1 mg) by mouth once daily.   10/26/2023    furosemide (Lasix) 20 mg tablet Take 1 tablet (20 mg) by mouth every other day.   10/26/2023    loratadine (Claritin) 10 mg tablet Take 1 tablet (10 mg) by mouth once daily as needed for allergies.   10/26/2023    metoprolol tartrate (Lopressor) 25 mg tablet Take 1 tablet (25 mg) by mouth 2 times a day.   10/26/2023    mometasone (Nasonex) 50 mcg/actuation nasal spray Administer 2 sprays into each nostril once daily.   10/26/2023    multivitamin tablet Take 1 tablet by mouth once daily.       ondansetron (Zofran) 4 mg tablet Take 1 tablet (4 mg) by mouth every 4 hours if needed for nausea.   10/26/2023    oxyCODONE (Roxicodone) 10 mg immediate release tablet Take 1 tablet (10 mg) by mouth every 4 hours if needed for severe pain (7 - 10) (pain). 75 tablet 0 10/26/2023    traZODone (Desyrel) 50 mg tablet Take by mouth once daily at bedtime. 1/2 - 1 tabs for insomnia   10/26/2023    warfarin (Coumadin) 5 mg tablet Take 1 tablet (5 mg) by mouth once daily in the evening.   10/26/2023        The list below reflectives the  updated allergy list. Please review each documented allergy for additional clarification and justification.  Allergies  Reviewed by Lizbeth Kaur PharmD on 10/27/2023   No Known Allergies       Sources used: Pharmacy dispense history, OARRs, patient interview (relatively poor historian-only able to confirm medications; pt drowsy during interview), and heme/onc note from 9/18    Below are additional concerns with the patient's PTA list.  -past due fills for the following medications: cymbalta (6/7- 30 day supply), folic acid (8/23- 30 day supply), nasonex (no fill hx), warfarin (8/24- 30 day supply), and trazodone (11/2022)  -pt denies taking nasonex and trazodone  -pt states that she follows a coumadin clinic off [facility], but unable to tell me the name of the facility    Lizbeth Kaur PharmD  Transitions of Care Pharmacist  Medication reconciliation complete  Please reach out via [employer] secure chat for questions, or if no response call [employer] or [facility]

## 2023-10-28 ENCOUNTER — APPOINTMENT (OUTPATIENT)
Dept: RADIOLOGY | Facility: HOSPITAL | Age: 53
DRG: 812 | End: 2023-10-28
Payer: COMMERCIAL

## 2023-10-28 LAB
ALBUMIN SERPL BCP-MCNC: 3.3 G/DL (ref 3.4–5)
ALP SERPL-CCNC: 101 U/L (ref 33–110)
ALT SERPL W P-5'-P-CCNC: 7 U/L (ref 7–45)
ANION GAP SERPL CALC-SCNC: 13 MMOL/L (ref 10–20)
APTT PPP: 28 SECONDS (ref 27–38)
AST SERPL W P-5'-P-CCNC: 10 U/L (ref 9–39)
BILIRUB SERPL-MCNC: 1.6 MG/DL (ref 0–1.2)
BLOOD EXPIRATION DATE: NORMAL
BUN SERPL-MCNC: 29 MG/DL (ref 6–23)
CALCIUM SERPL-MCNC: 7.9 MG/DL (ref 8.6–10.6)
CHLORIDE SERPL-SCNC: 105 MMOL/L (ref 98–107)
CHLORIDE UR-SCNC: 19 MMOL/L
CHLORIDE/CREATININE (MMOL/G) IN URINE: 8 MMOL/G CREAT (ref 38–318)
CO2 SERPL-SCNC: 25 MMOL/L (ref 21–32)
CREAT SERPL-MCNC: 1.64 MG/DL (ref 0.5–1.05)
CREAT UR-MCNC: 229.4 MG/DL (ref 20–320)
DISPENSE STATUS: NORMAL
ERYTHROCYTE [DISTWIDTH] IN BLOOD BY AUTOMATED COUNT: 25.1 % (ref 11.5–14.5)
GFR SERPL CREATININE-BSD FRML MDRD: 37 ML/MIN/1.73M*2
GLUCOSE SERPL-MCNC: 91 MG/DL (ref 74–99)
HCT VFR BLD AUTO: 27.8 % (ref 36–46)
HGB BLD-MCNC: 8.8 G/DL (ref 12–16)
HGB RETIC QN: 24 PG (ref 28–38)
IMMATURE RETIC FRACTION: 31.3 %
INR PPP: 1.1 (ref 0.9–1.1)
LDH SERPL L TO P-CCNC: 183 U/L (ref 84–246)
MCH RBC QN AUTO: 24.4 PG (ref 26–34)
MCHC RBC AUTO-ENTMCNC: 31.7 G/DL (ref 32–36)
MCV RBC AUTO: 77 FL (ref 80–100)
NRBC BLD-RTO: 0.9 /100 WBCS (ref 0–0)
PLATELET # BLD AUTO: 177 X10*3/UL (ref 150–450)
PMV BLD AUTO: 9.6 FL (ref 7.5–11.5)
POTASSIUM SERPL-SCNC: 4.7 MMOL/L (ref 3.5–5.3)
POTASSIUM UR-SCNC: 35 MMOL/L
POTASSIUM/CREAT UR-RTO: 15 MMOL/G CREAT
PRODUCT BLOOD TYPE: 1700
PRODUCT BLOOD TYPE: 5100
PRODUCT BLOOD TYPE: 7300
PRODUCT BLOOD TYPE: 7300
PRODUCT CODE: NORMAL
PROT SERPL-MCNC: 6.2 G/DL (ref 6.4–8.2)
PROTHROMBIN TIME: 12.8 SECONDS (ref 9.8–12.8)
RBC # BLD AUTO: 3.6 X10*6/UL (ref 4–5.2)
RETICS #: 0.15 X10*6/UL (ref 0.02–0.08)
RETICS/RBC NFR AUTO: 4.3 % (ref 0.5–2)
SODIUM SERPL-SCNC: 138 MMOL/L (ref 136–145)
SODIUM UR-SCNC: 10 MMOL/L
SODIUM/CREAT UR-RTO: 4 MMOL/G CREAT
UNIT ABO: NORMAL
UNIT NUMBER: NORMAL
UNIT RH: NORMAL
UNIT VOLUME: 350
UNIT VOLUME: 400
UNIT VOLUME: 400
WBC # BLD AUTO: 9.3 X10*3/UL (ref 4.4–11.3)
XM INTEP: NORMAL

## 2023-10-28 PROCEDURE — 73130 X-RAY EXAM OF HAND: CPT | Mod: LT,FY

## 2023-10-28 PROCEDURE — 73130 X-RAY EXAM OF HAND: CPT | Mod: RT,FY

## 2023-10-28 PROCEDURE — 73130 X-RAY EXAM OF HAND: CPT | Mod: LEFT SIDE | Performed by: RADIOLOGY

## 2023-10-28 PROCEDURE — 2500000001 HC RX 250 WO HCPCS SELF ADMINISTERED DRUGS (ALT 637 FOR MEDICARE OP): Performed by: PHYSICIAN ASSISTANT

## 2023-10-28 PROCEDURE — 1270000001 HC SEMI-PRIVATE ONCOLOGY ROOM DAILY

## 2023-10-28 PROCEDURE — 85027 COMPLETE CBC AUTOMATED: CPT | Performed by: PHYSICIAN ASSISTANT

## 2023-10-28 PROCEDURE — 85610 PROTHROMBIN TIME: CPT | Performed by: PHYSICIAN ASSISTANT

## 2023-10-28 PROCEDURE — 2500000004 HC RX 250 GENERAL PHARMACY W/ HCPCS (ALT 636 FOR OP/ED): Performed by: PHYSICIAN ASSISTANT

## 2023-10-28 PROCEDURE — 80053 COMPREHEN METABOLIC PANEL: CPT | Performed by: PHYSICIAN ASSISTANT

## 2023-10-28 PROCEDURE — 85730 THROMBOPLASTIN TIME PARTIAL: CPT | Performed by: PHYSICIAN ASSISTANT

## 2023-10-28 PROCEDURE — 85613 RUSSELL VIPER VENOM DILUTED: CPT | Performed by: PHYSICIAN ASSISTANT

## 2023-10-28 PROCEDURE — 83615 LACTATE (LD) (LDH) ENZYME: CPT | Performed by: PHYSICIAN ASSISTANT

## 2023-10-28 PROCEDURE — 1200000002 HC GENERAL ROOM WITH TELEMETRY DAILY

## 2023-10-28 PROCEDURE — 85045 AUTOMATED RETICULOCYTE COUNT: CPT | Performed by: PHYSICIAN ASSISTANT

## 2023-10-28 PROCEDURE — G0378 HOSPITAL OBSERVATION PER HR: HCPCS

## 2023-10-28 PROCEDURE — 94660 CPAP INITIATION&MGMT: CPT

## 2023-10-28 PROCEDURE — 99233 SBSQ HOSP IP/OBS HIGH 50: CPT | Performed by: PHYSICIAN ASSISTANT

## 2023-10-28 RX ORDER — HYDROMORPHONE HYDROCHLORIDE 1 MG/ML
0.6 INJECTION, SOLUTION INTRAMUSCULAR; INTRAVENOUS; SUBCUTANEOUS
Status: DISCONTINUED | OUTPATIENT
Start: 2023-10-28 | End: 2023-10-29

## 2023-10-28 RX ORDER — WARFARIN 7.5 MG/1
7.5 TABLET ORAL ONCE
Status: COMPLETED | OUTPATIENT
Start: 2023-10-28 | End: 2023-10-28

## 2023-10-28 RX ADMIN — OXYCODONE HYDROCHLORIDE 10 MG: 5 TABLET ORAL at 01:29

## 2023-10-28 RX ADMIN — DOCUSATE SODIUM 100 MG: 100 CAPSULE, LIQUID FILLED ORAL at 09:29

## 2023-10-28 RX ADMIN — POLYETHYLENE GLYCOL 3350 17 G: 17 POWDER, FOR SOLUTION ORAL at 09:31

## 2023-10-28 RX ADMIN — DEXTROSE AND SODIUM CHLORIDE 75 ML/HR: 5; 450 INJECTION, SOLUTION INTRAVENOUS at 05:42

## 2023-10-28 RX ADMIN — METOPROLOL TARTRATE 25 MG: 25 TABLET, FILM COATED ORAL at 09:25

## 2023-10-28 RX ADMIN — Medication: at 00:17

## 2023-10-28 RX ADMIN — HYDROMORPHONE HYDROCHLORIDE 0.6 MG: 1 INJECTION, SOLUTION INTRAMUSCULAR; INTRAVENOUS; SUBCUTANEOUS at 11:27

## 2023-10-28 RX ADMIN — WARFARIN SODIUM 7.5 MG: 7.5 TABLET ORAL at 17:35

## 2023-10-28 RX ADMIN — OXYCODONE HYDROCHLORIDE 10 MG: 5 TABLET ORAL at 20:12

## 2023-10-28 RX ADMIN — METOPROLOL TARTRATE 25 MG: 25 TABLET, FILM COATED ORAL at 20:13

## 2023-10-28 RX ADMIN — DULOXETINE HYDROCHLORIDE 20 MG: 20 CAPSULE, DELAYED RELEASE ORAL at 11:27

## 2023-10-28 RX ADMIN — TRAZODONE HYDROCHLORIDE 50 MG: 50 TABLET ORAL at 20:06

## 2023-10-28 RX ADMIN — HYDROMORPHONE HYDROCHLORIDE 0.6 MG: 1 INJECTION, SOLUTION INTRAMUSCULAR; INTRAVENOUS; SUBCUTANEOUS at 16:02

## 2023-10-28 RX ADMIN — FOLIC ACID 1 MG: 1 TABLET ORAL at 09:25

## 2023-10-28 RX ADMIN — HYDROMORPHONE HYDROCHLORIDE 0.6 MG: 1 INJECTION, SOLUTION INTRAMUSCULAR; INTRAVENOUS; SUBCUTANEOUS at 23:24

## 2023-10-28 RX ADMIN — OXYCODONE HYDROCHLORIDE 10 MG: 5 TABLET ORAL at 05:57

## 2023-10-28 RX ADMIN — DEXTROSE AND SODIUM CHLORIDE 75 ML/HR: 5; 450 INJECTION, SOLUTION INTRAVENOUS at 18:53

## 2023-10-28 ASSESSMENT — PAIN SCALES - GENERAL
PAINLEVEL_OUTOF10: 7
PAINLEVEL_OUTOF10: 8
PAINLEVEL_OUTOF10: 8
PAINLEVEL_OUTOF10: 9
PAINLEVEL_OUTOF10: 8
PAINLEVEL_OUTOF10: 9
PAINLEVEL_OUTOF10: 9
PAINLEVEL_OUTOF10: 8
PAINLEVEL_OUTOF10: 8

## 2023-10-28 ASSESSMENT — COGNITIVE AND FUNCTIONAL STATUS - GENERAL
DAILY ACTIVITIY SCORE: 24
MOBILITY SCORE: 24
MOBILITY SCORE: 24
DAILY ACTIVITIY SCORE: 24

## 2023-10-28 ASSESSMENT — PAIN DESCRIPTION - DESCRIPTORS: DESCRIPTORS: ACHING

## 2023-10-28 ASSESSMENT — PAIN - FUNCTIONAL ASSESSMENT
PAIN_FUNCTIONAL_ASSESSMENT: 0-10

## 2023-10-28 NOTE — PROGRESS NOTES
"Senait Marshall is a 53 y.o. female on day 1 of admission presenting with Sickle cell disease with crisis and other complication (CMS/HCC).    Subjective   Seen this morning at bedside, still c/o pain all over, she had her routine exchange done yesterday.  We discussed getting xray of her hands and urine studies. Denies chest pain, SOB, fever/chills.     Objective     Physical Exam    Last Recorded Vitals  Blood pressure 95/63, pulse 70, temperature 36.5 °C (97.7 °F), temperature source Temporal, resp. rate 16, height 1.65 m (5' 4.96\"), weight 110 kg (241 lb 9.6 oz), SpO2 96 %.  Intake/Output last 3 Shifts:  I/O last 3 completed shifts:  In: 5546 (50.6 mL/kg) [I.V.:3125 (28.5 mL/kg); Other:1921; IV Piggyback:500]  Out: 1944 (17.7 mL/kg) [Urine:1 (0 mL/kg/hr); Other:1943]  Weight: 109.6 kg     Relevant Results           This patient currently has cardiac telemetry ordered; if you would like to modify or discontinue the telemetry order, click here to go to the orders activity to modify/discontinue the order.    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 Pt has had complications with past outpatient exchanges; including SVT following line placement requiring admission and hypoxia/lethargy following exchange found to have new pHTN. Patient is now admitted for observation following apheresis line placement and RBCEx. Patient had femoral line placed by IR on 10/26 and had red cell " exchange on 10/27. Her outpatient labs done prior to admit was concerning for lupus and rheumatology was consulted with workup in progress.      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  - NOT in pain crisis, admitted for observation following line/red cell exchange  - Will order home Oxycodone 10mg q4hrs PRN severe pain if needed   - On admit started IV dilaudid 0.6mg q3hrs PRN breakthrough pain  - Bowel regimen for opioid-induced constipation   - Continue home Duloxetine 20mg daily, PO Zofran PRN, Folic Acid 1mg daily, and MVI daily  - S/P apheresis line placement on 10/26, plan to remove prior to discharge   - S/P RBCEx on 10/27  - Exchange labs done, HgbS 27.1%, HgbC 26.4%   - Cont Telemetry     # Concerns for autoimmune disease   - Low suspicion for SLE per rheum   - Had labs done outpatient that shows positive ANAwith low titire dsDNA  - Xray hands ordered   - f/u C3/C4/ESR/CRP, urine studies   - Rheumatology consulted, recs appreciated     # New pHTN   - 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 Yi and Dr. 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 Cr 2.4  on admit, cr 10/28 1.64  - Will give gentle IV hydration X12 hrs    - Renal US ordered   - UA/urine culture, urine electrolyte ordered for eval  - Recommend to avoid contrast and NSAIDS  - Follows with nephrology otpt     # Hx SVT  - Baseline admission EKG ordered; pending  - Echo (6/29) EF 60-65%  - Continue home Metoprolol Succs 25mg daily  - Follows with Cardiology outpt  - Telemetry ordered     # DVT/ PE/ SVC Thrombus  - Hx SVC stricture s/p SVC angioplasty  - B/l Upper Extremity and Facial Swelling d/t partial filling defect within the SVC and a thrombus of the SVC complicated by bilateral Mediport catheters. On lifelong anticoagulation, DOAC discouraged due to high risk of clotting.   - Continue home Coumadin 5mg daily   - Caution with fluids, will hydrate X 12 hrs for CHARLES   - Monitor with INR   - Follows with Coumadin clinic outpatient    # CAN  - Continue home CPAP   - Continue home Albuterol PRN     # H/O Cauda Equine syndrome  - Follows Dr. Delacruz     # DISPO:  - Full Code, confirmed on admit  - DC home with resumed O2 pending rheumatology [facility]  - FUV with sickle cell clinic on discharge        I spent >60  minutes in the professional and overall care of this patient.      Leanne Gonzales PA-C

## 2023-10-28 NOTE — CARE PLAN
The patient's goals for the shift include  Problem: Pain - Adult  Goal: Verbalizes/displays adequate comfort level or baseline comfort level  Outcome: Progressing     Problem: Safety - Adult  Goal: Free from fall injury  Outcome: Progressing     Problem: Discharge Planning  Goal: Discharge to home or other facility with appropriate resources  Outcome: Progressing     Problem: Chronic Conditions and Co-morbidities  Goal: Patient's chronic conditions and co-morbidity symptoms are monitored and maintained or improved  Outcome: Progressing

## 2023-10-28 NOTE — CARE PLAN
Problem: Safety - Adult  Goal: Free from fall injury  Outcome: Progressing     Problem: Pain - Adult  Goal: Verbalizes/displays adequate comfort level or baseline comfort level  Outcome: Progressing

## 2023-10-29 PROBLEM — D57.00 SICKLE CELL CRISIS (MULTI): Status: ACTIVE | Noted: 2023-10-29

## 2023-10-29 LAB
ALBUMIN SERPL BCP-MCNC: 3.3 G/DL (ref 3.4–5)
ALP SERPL-CCNC: 102 U/L (ref 33–110)
ALT SERPL W P-5'-P-CCNC: 5 U/L (ref 7–45)
ANION GAP SERPL CALC-SCNC: <7 MMOL/L (ref 10–20)
APTT PPP: 28 SECONDS (ref 27–38)
AST SERPL W P-5'-P-CCNC: 8 U/L (ref 9–39)
BILIRUB SERPL-MCNC: 0.8 MG/DL (ref 0–1.2)
BUN SERPL-MCNC: 22 MG/DL (ref 6–23)
CALCIUM SERPL-MCNC: 8.4 MG/DL (ref 8.6–10.6)
CHLORIDE SERPL-SCNC: 104 MMOL/L (ref 98–107)
CO2 SERPL-SCNC: 30 MMOL/L (ref 21–32)
COLLECT DURATION TIME SPEC: 24 HRS
CREAT 24H UR-MCNC: 128.4 MG/DL (ref 20–320)
CREAT 24H UR-MRATE: 1.09 G/24 H
CREAT SERPL-MCNC: 1.35 MG/DL (ref 0.5–1.05)
ERYTHROCYTE [DISTWIDTH] IN BLOOD BY AUTOMATED COUNT: 26 % (ref 11.5–14.5)
GFR SERPL CREATININE-BSD FRML MDRD: 47 ML/MIN/1.73M*2
GLUCOSE SERPL-MCNC: 90 MG/DL (ref 74–99)
HCT VFR BLD AUTO: 26.8 % (ref 36–46)
HGB BLD-MCNC: 8.5 G/DL (ref 12–16)
HGB RETIC QN: 26 PG (ref 28–38)
IMMATURE RETIC FRACTION: 32.2 %
INR PPP: 1.2 (ref 0.9–1.1)
LDH SERPL L TO P-CCNC: 156 U/L (ref 84–246)
MCH RBC QN AUTO: 24.9 PG (ref 26–34)
MCHC RBC AUTO-ENTMCNC: 31.7 G/DL (ref 32–36)
MCV RBC AUTO: 78 FL (ref 80–100)
NRBC BLD-RTO: 16.8 /100 WBCS (ref 0–0)
PLATELET # BLD AUTO: 193 X10*3/UL (ref 150–450)
PMV BLD AUTO: 10.1 FL (ref 7.5–11.5)
POTASSIUM SERPL-SCNC: 5 MMOL/L (ref 3.5–5.3)
PROT 24H UR-MCNC: 13 MG/DL (ref 5–24)
PROT 24H UR-MRATE: 111 MG/24H
PROT SERPL-MCNC: 5.9 G/DL (ref 6.4–8.2)
PROTHROMBIN TIME: 13.7 SECONDS (ref 9.8–12.8)
RBC # BLD AUTO: 3.42 X10*6/UL (ref 4–5.2)
RETICS #: 0.18 X10*6/UL (ref 0.02–0.08)
RETICS/RBC NFR AUTO: 5.2 % (ref 0.5–2)
SODIUM SERPL-SCNC: 135 MMOL/L (ref 136–145)
SPECIMEN VOL 24H UR: 850 ML
WBC # BLD AUTO: 8.5 X10*3/UL (ref 4.4–11.3)

## 2023-10-29 PROCEDURE — 1270000001 HC SEMI-PRIVATE ONCOLOGY ROOM DAILY

## 2023-10-29 PROCEDURE — 5A09357 ASSISTANCE WITH RESPIRATORY VENTILATION, LESS THAN 24 CONSECUTIVE HOURS, CONTINUOUS POSITIVE AIRWAY PRESSURE: ICD-10-PCS | Performed by: INTERNAL MEDICINE

## 2023-10-29 PROCEDURE — 2500000004 HC RX 250 GENERAL PHARMACY W/ HCPCS (ALT 636 FOR OP/ED)

## 2023-10-29 PROCEDURE — 85045 AUTOMATED RETICULOCYTE COUNT: CPT | Performed by: PHYSICIAN ASSISTANT

## 2023-10-29 PROCEDURE — 2500000001 HC RX 250 WO HCPCS SELF ADMINISTERED DRUGS (ALT 637 FOR MEDICARE OP): Performed by: PHYSICIAN ASSISTANT

## 2023-10-29 PROCEDURE — 99232 SBSQ HOSP IP/OBS MODERATE 35: CPT

## 2023-10-29 PROCEDURE — 85730 THROMBOPLASTIN TIME PARTIAL: CPT

## 2023-10-29 PROCEDURE — 1200000002 HC GENERAL ROOM WITH TELEMETRY DAILY

## 2023-10-29 PROCEDURE — 83615 LACTATE (LD) (LDH) ENZYME: CPT | Performed by: PHYSICIAN ASSISTANT

## 2023-10-29 PROCEDURE — 2500000002 HC RX 250 W HCPCS SELF ADMINISTERED DRUGS (ALT 637 FOR MEDICARE OP, ALT 636 FOR OP/ED): Performed by: PHYSICIAN ASSISTANT

## 2023-10-29 PROCEDURE — 94660 CPAP INITIATION&MGMT: CPT

## 2023-10-29 PROCEDURE — 85027 COMPLETE CBC AUTOMATED: CPT | Performed by: PHYSICIAN ASSISTANT

## 2023-10-29 PROCEDURE — 84156 ASSAY OF PROTEIN URINE: CPT | Performed by: PHYSICIAN ASSISTANT

## 2023-10-29 PROCEDURE — 80053 COMPREHEN METABOLIC PANEL: CPT | Performed by: PHYSICIAN ASSISTANT

## 2023-10-29 PROCEDURE — 2500000004 HC RX 250 GENERAL PHARMACY W/ HCPCS (ALT 636 FOR OP/ED): Performed by: PHYSICIAN ASSISTANT

## 2023-10-29 RX ORDER — WARFARIN 7.5 MG/1
7.5 TABLET ORAL DAILY
Status: COMPLETED | OUTPATIENT
Start: 2023-10-29 | End: 2023-10-30

## 2023-10-29 RX ORDER — HYDROMORPHONE HYDROCHLORIDE 1 MG/ML
0.6 INJECTION, SOLUTION INTRAMUSCULAR; INTRAVENOUS; SUBCUTANEOUS EVERY 6 HOURS PRN
Status: DISCONTINUED | OUTPATIENT
Start: 2023-10-29 | End: 2023-10-30

## 2023-10-29 RX ADMIN — METOPROLOL TARTRATE 25 MG: 25 TABLET, FILM COATED ORAL at 21:11

## 2023-10-29 RX ADMIN — HYDROMORPHONE HYDROCHLORIDE 0.6 MG: 1 INJECTION, SOLUTION INTRAMUSCULAR; INTRAVENOUS; SUBCUTANEOUS at 21:12

## 2023-10-29 RX ADMIN — FOLIC ACID 1 MG: 1 TABLET ORAL at 09:55

## 2023-10-29 RX ADMIN — DULOXETINE HYDROCHLORIDE 20 MG: 20 CAPSULE, DELAYED RELEASE ORAL at 09:55

## 2023-10-29 RX ADMIN — HYDROMORPHONE HYDROCHLORIDE 0.6 MG: 1 INJECTION, SOLUTION INTRAMUSCULAR; INTRAVENOUS; SUBCUTANEOUS at 07:31

## 2023-10-29 RX ADMIN — HYDROMORPHONE HYDROCHLORIDE 0.6 MG: 1 INJECTION, SOLUTION INTRAMUSCULAR; INTRAVENOUS; SUBCUTANEOUS at 10:43

## 2023-10-29 RX ADMIN — FLUTICASONE PROPIONATE 2 SPRAY: 50 SPRAY, METERED NASAL at 09:55

## 2023-10-29 RX ADMIN — METOPROLOL TARTRATE 25 MG: 25 TABLET, FILM COATED ORAL at 09:55

## 2023-10-29 RX ADMIN — WARFARIN SODIUM 7.5 MG: 7.5 TABLET ORAL at 18:50

## 2023-10-29 RX ADMIN — FUROSEMIDE 20 MG: 20 TABLET ORAL at 09:55

## 2023-10-29 RX ADMIN — HYDROMORPHONE HYDROCHLORIDE 0.6 MG: 1 INJECTION, SOLUTION INTRAMUSCULAR; INTRAVENOUS; SUBCUTANEOUS at 16:10

## 2023-10-29 RX ADMIN — OXYCODONE HYDROCHLORIDE 10 MG: 5 TABLET ORAL at 04:24

## 2023-10-29 RX ADMIN — TRAZODONE HYDROCHLORIDE 50 MG: 50 TABLET ORAL at 21:11

## 2023-10-29 ASSESSMENT — COGNITIVE AND FUNCTIONAL STATUS - GENERAL
DAILY ACTIVITIY SCORE: 24
MOBILITY SCORE: 24

## 2023-10-29 ASSESSMENT — PAIN SCALES - GENERAL
PAINLEVEL_OUTOF10: 8
PAINLEVEL_OUTOF10: 9
PAINLEVEL_OUTOF10: 7
PAINLEVEL_OUTOF10: 8

## 2023-10-29 ASSESSMENT — PAIN - FUNCTIONAL ASSESSMENT
PAIN_FUNCTIONAL_ASSESSMENT: 0-10

## 2023-10-29 ASSESSMENT — PAIN DESCRIPTION - DESCRIPTORS: DESCRIPTORS: ACHING

## 2023-10-29 NOTE — CARE PLAN
The patient's goals for the shift include      The clinical goals for the shift include Patient will maintain systolic pressure at or above 90 mm Hg and diastolic pressure at or above 50 mm Hg to each measurement of vital signs through end of shift.      Problem: Pain - Adult  Goal: Verbalizes/displays adequate comfort level or baseline comfort level  Outcome: Progressing     Problem: Safety - Adult  Goal: Free from fall injury  Outcome: Progressing     Problem: Discharge Planning  Goal: Discharge to home or other facility with appropriate resources  Outcome: Progressing     Problem: Chronic Conditions and Co-morbidities  Goal: Patient's chronic conditions and co-morbidity symptoms are monitored and maintained or improved  Outcome: Progressing     Problem: Pain  Goal: Takes deep breaths with improved pain control throughout the shift  Outcome: Progressing  Goal: Turns in bed with improved pain control throughout the shift  Outcome: Progressing  Goal: Walks with improved pain control throughout the shift  Outcome: Progressing  Goal: Performs ADL's with improved pain control throughout shift  Outcome: Progressing  Goal: Participates in PT with improved pain control throughout the shift  Outcome: Progressing  Goal: Free from opioid side effects throughout the shift  Outcome: Progressing  Goal: Free from acute confusion related to pain meds throughout the shift  Outcome: Progressing

## 2023-10-29 NOTE — SIGNIFICANT EVENT
RADAR initiated Rapid Response page     10/29/23 0843   Onset Documentation   Rapid Response Initiated By Radar auto page   Location/Room [facility]   Pager Time [phone]   Arrival Time 0844   Event End Time 0900   Level II Called No   Primary Reason for Call Radar auto page     RADAR of 6 received for VS of 35.9, 58, 18, 98/61, 95%.  RN made aware of VS.  No interventions required of Rapid Response RN at present time.  Patient asleep upon arrival of RRT RN.  Patient allowed to rest.  RN encouraged to page Rapid Response if further concern in patient's condition arises.

## 2023-10-29 NOTE — PROGRESS NOTES
"Senait Narvaez is a 53 y.o. female on day 1 of admission presenting with Sickle cell disease with crisis and other complication (CMS/HCC).    Subjective   Patient seen at bedside. States that she continues to have pain in her joints, and back. Rates pain 10/10. States that pain is somewhat relieved with pain medications. Denies SOB, CP, heart palpitations, N/V/D/C, fever/chills        Objective     Physical Exam  Constitutional:       Appearance: Normal appearance.   HENT:      Head: Normocephalic.      Nose: Nose normal.   Eyes:      Pupils: Pupils are equal, round, and reactive to light.   Cardiovascular:      Rate and Rhythm: Normal rate and regular rhythm.   Pulmonary:      Effort: Pulmonary effort is normal.   Abdominal:      General: Bowel sounds are normal.   Musculoskeletal:         General: Normal range of motion.      Cervical back: Normal range of motion.   Neurological:      General: No focal deficit present.      Mental Status: She is alert and oriented to person, place, and time.   Psychiatric:         Mood and Affect: Mood normal.         Behavior: Behavior normal.         Last Recorded Vitals  Blood pressure 110/66, pulse 53, temperature 36.4 °C (97.5 °F), temperature source Temporal, resp. rate 16, height 1.65 m (5' 4.96\"), weight 110 kg (241 lb 9.6 oz), SpO2 100 %.  Intake/Output last 3 Shifts:  I/O last 3 completed shifts:  In: - (0 mL/kg)   Out: 250 (2.3 mL/kg) [Urine:250 (0.1 mL/kg/hr)]  Weight: 109.6 kg     Relevant Results           This patient currently has cardiac telemetry ordered; if you would like to modify or discontinue the telemetry order, click here to go to the orders activity to modify/discontinue the order.  This patient has a central line   Reason for the central line remaining today? 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 Pt has had complications with past outpatient exchanges; including SVT following line placement requiring admission and hypoxia/lethargy following exchange found to have new pHTN. Patient is now admitted for observation following apheresis line placement and RBCEx. Patient had femoral line placed by IR on 10/26 and had red cell exchange on 10/27. Her outpatient labs done prior to admit. During admission, concerns fro SLE d/t pain and CHARLES on CKD. Rheumatology consulted 10/27 with workup in progress.      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  - NOT in pain crisis, admitted for observation following line/red cell exchange  - Will order home Oxycodone 10mg q4hrs PRN severe pain if needed   - On admit started IV dilaudid 0.6mg q3hrs PRN breakthrough pain. 10/29 decreased fruequency to q6 hours with plan to rotate with PO Oxycodone   - Bowel regimen for opioid-induced constipation   - Continue home Duloxetine 20mg daily, PO Zofran PRN, Folic Acid 1mg daily, and MVI daily  - S/P apheresis line placement on 10/26, plan to remove 10/29  - S/P RBCEx on 10/27  - Exchange labs done, HgbS 27.1%, HgbC 26.4%      # Concerns for autoimmune disease   -  Low suspicion for SLE per rheum   - Had labs done outpatient that shows positive ISABELLE with low titire dsDNA  - Xray hands neg for acute process    - f/u C3/C4/ESR/CRP, urine studies   - Rheumatology consulted 10/27, recs appreciated      # New pHTN   - 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 Yi and Dr. 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 Cr 2.4 on admit. 10/29 1.35  - 10/28 gentle IV hydration X12 hrs    - Renal US 10/27 unremarkable   - UA/urine culture, urine electrolyte ordered for eval-unremarkable   - Recommend to avoid contrast and NSAIDS  - Follows with nephrology otpt     # Hx SVT  - Baseline admission EKG ordered  - Echo (6/29) EF 60-65%  - Continue home Metoprolol Succs 25mg daily  - Follows with Cardiology outpt  - Telemetry ordered     # DVT/ PE/ SVC Thrombus  - Hx SVC stricture s/p SVC angioplasty  - B/l Upper Extremity and Facial Swelling d/t partial filling defect within the SVC and a thrombus of the SVC complicated by bilateral Mediport catheters. On lifelong anticoagulation, DOAC discouraged due to high risk of clotting.   - Continue home Coumadin 5mg daily   - Caution with fluids  - Monitor with INR daily   - 10/28 INR subtherapeutic. Gave 1 dose 7.5 mg Warfarin   - Follows with Coumadin clinic outpatient     # CAN  - Continue home CPAP   - Continue home Albuterol PRN     # H/O Cauda Equine syndrome  - Follows Dr. Delacruz     # DISPO:  -  Full Code, confirmed on admit  - DC home with resumed O2 pending rheumatology mccloud  - FUV with sickle cell clinic on discharge          I spent 60 minutes in the professional and overall care of this patient.      Rach Saleh, APRN-CNP

## 2023-10-30 LAB
ALBUMIN SERPL BCP-MCNC: 3.2 G/DL (ref 3.4–5)
ALP SERPL-CCNC: 99 U/L (ref 33–110)
ALT SERPL W P-5'-P-CCNC: 5 U/L (ref 7–45)
ANION GAP SERPL CALC-SCNC: 9 MMOL/L (ref 10–20)
APTT PPP: 31 SECONDS (ref 27–38)
AST SERPL W P-5'-P-CCNC: 8 U/L (ref 9–39)
BILIRUB SERPL-MCNC: 0.8 MG/DL (ref 0–1.2)
BUN SERPL-MCNC: 19 MG/DL (ref 6–23)
CALCIUM SERPL-MCNC: 8.4 MG/DL (ref 8.6–10.6)
CHLORIDE SERPL-SCNC: 105 MMOL/L (ref 98–107)
CO2 SERPL-SCNC: 30 MMOL/L (ref 21–32)
CREAT SERPL-MCNC: 1.06 MG/DL (ref 0.5–1.05)
DRVVT SCREEN TO CONFIRM RATIO: 0.91 RATIO
DRVVT/DRVVT CFM NRMLZD PPP-RTO: 0.97 RATIO
DRVVT/DRVVT CFM P DOAC NEUT NORM PPP-RTO: 0.94 RATIO
ERYTHROCYTE [DISTWIDTH] IN BLOOD BY AUTOMATED COUNT: 26.1 % (ref 11.5–14.5)
GFR SERPL CREATININE-BSD FRML MDRD: 63 ML/MIN/1.73M*2
GLUCOSE SERPL-MCNC: 84 MG/DL (ref 74–99)
HCT VFR BLD AUTO: 29.2 % (ref 36–46)
HGB BLD-MCNC: 9.4 G/DL (ref 12–16)
HGB RETIC QN: 26 PG (ref 28–38)
IMMATURE RETIC FRACTION: 29.7 %
INR PPP: 1.5 (ref 0.9–1.1)
LA 2 SCREEN W REFLEX-IMP: NORMAL
LDH SERPL L TO P-CCNC: 155 U/L (ref 84–246)
MCH RBC QN AUTO: 25.3 PG (ref 26–34)
MCHC RBC AUTO-ENTMCNC: 32.2 G/DL (ref 32–36)
MCV RBC AUTO: 79 FL (ref 80–100)
NORMALIZED SCT PPP-RTO: 0.67 RATIO
NRBC BLD-RTO: 1.2 /100 WBCS (ref 0–0)
PLATELET # BLD AUTO: 193 X10*3/UL (ref 150–450)
PMV BLD AUTO: 9.3 FL (ref 7.5–11.5)
POTASSIUM SERPL-SCNC: 4.8 MMOL/L (ref 3.5–5.3)
PROT SERPL-MCNC: 5.8 G/DL (ref 6.4–8.2)
PROTHROMBIN TIME: 17.5 SECONDS (ref 9.8–12.8)
RBC # BLD AUTO: 3.72 X10*6/UL (ref 4–5.2)
RETICS #: 0.21 X10*6/UL (ref 0.02–0.08)
RETICS/RBC NFR AUTO: 5.8 % (ref 0.5–2)
SILICA CLOTTING TIME CONFIRMATION: 1.18 RATIO
SILICA CLOTTING TIME SCREEN: 0.8 RATIO
SODIUM SERPL-SCNC: 139 MMOL/L (ref 136–145)
WBC # BLD AUTO: 7.8 X10*3/UL (ref 4.4–11.3)

## 2023-10-30 PROCEDURE — 99232 SBSQ HOSP IP/OBS MODERATE 35: CPT | Performed by: INTERNAL MEDICINE

## 2023-10-30 PROCEDURE — 36415 COLL VENOUS BLD VENIPUNCTURE: CPT

## 2023-10-30 PROCEDURE — 2500000001 HC RX 250 WO HCPCS SELF ADMINISTERED DRUGS (ALT 637 FOR MEDICARE OP): Performed by: PHYSICIAN ASSISTANT

## 2023-10-30 PROCEDURE — 94660 CPAP INITIATION&MGMT: CPT

## 2023-10-30 PROCEDURE — 80053 COMPREHEN METABOLIC PANEL: CPT | Performed by: PHYSICIAN ASSISTANT

## 2023-10-30 PROCEDURE — 2500000004 HC RX 250 GENERAL PHARMACY W/ HCPCS (ALT 636 FOR OP/ED)

## 2023-10-30 PROCEDURE — 85730 THROMBOPLASTIN TIME PARTIAL: CPT

## 2023-10-30 PROCEDURE — 36415 COLL VENOUS BLD VENIPUNCTURE: CPT | Performed by: PHYSICIAN ASSISTANT

## 2023-10-30 PROCEDURE — 2500000004 HC RX 250 GENERAL PHARMACY W/ HCPCS (ALT 636 FOR OP/ED): Performed by: PHYSICIAN ASSISTANT

## 2023-10-30 PROCEDURE — 85045 AUTOMATED RETICULOCYTE COUNT: CPT | Performed by: PHYSICIAN ASSISTANT

## 2023-10-30 PROCEDURE — 99233 SBSQ HOSP IP/OBS HIGH 50: CPT | Performed by: PHYSICIAN ASSISTANT

## 2023-10-30 PROCEDURE — 1270000001 HC SEMI-PRIVATE ONCOLOGY ROOM DAILY

## 2023-10-30 PROCEDURE — 83615 LACTATE (LD) (LDH) ENZYME: CPT | Performed by: PHYSICIAN ASSISTANT

## 2023-10-30 PROCEDURE — 85027 COMPLETE CBC AUTOMATED: CPT | Performed by: PHYSICIAN ASSISTANT

## 2023-10-30 PROCEDURE — 1200000002 HC GENERAL ROOM WITH TELEMETRY DAILY

## 2023-10-30 RX ORDER — OXYCODONE HYDROCHLORIDE 5 MG/1
10 TABLET ORAL
Status: DISCONTINUED | OUTPATIENT
Start: 2023-10-30 | End: 2023-10-31

## 2023-10-30 RX ORDER — LACTULOSE 10 G/15ML
20 SOLUTION ORAL 4 TIMES DAILY
Status: DISPENSED | OUTPATIENT
Start: 2023-10-30 | End: 2023-10-31

## 2023-10-30 RX ORDER — FUROSEMIDE 20 MG/1
20 TABLET ORAL DAILY
Status: DISCONTINUED | OUTPATIENT
Start: 2023-10-31 | End: 2023-11-01 | Stop reason: HOSPADM

## 2023-10-30 RX ORDER — HYDROMORPHONE HYDROCHLORIDE 1 MG/ML
0.6 INJECTION, SOLUTION INTRAMUSCULAR; INTRAVENOUS; SUBCUTANEOUS
Status: DISCONTINUED | OUTPATIENT
Start: 2023-10-30 | End: 2023-11-01 | Stop reason: HOSPADM

## 2023-10-30 RX ADMIN — LACTULOSE 20 G: 20 SOLUTION ORAL at 12:36

## 2023-10-30 RX ADMIN — DOCUSATE SODIUM 100 MG: 100 CAPSULE, LIQUID FILLED ORAL at 11:25

## 2023-10-30 RX ADMIN — TRAZODONE HYDROCHLORIDE 50 MG: 50 TABLET ORAL at 20:47

## 2023-10-30 RX ADMIN — OXYCODONE HYDROCHLORIDE 10 MG: 5 TABLET ORAL at 07:34

## 2023-10-30 RX ADMIN — OXYCODONE HYDROCHLORIDE 10 MG: 5 TABLET ORAL at 17:48

## 2023-10-30 RX ADMIN — HYDROMORPHONE HYDROCHLORIDE 0.6 MG: 1 INJECTION, SOLUTION INTRAMUSCULAR; INTRAVENOUS; SUBCUTANEOUS at 14:35

## 2023-10-30 RX ADMIN — HYDROMORPHONE HYDROCHLORIDE 0.6 MG: 1 INJECTION, SOLUTION INTRAMUSCULAR; INTRAVENOUS; SUBCUTANEOUS at 08:59

## 2023-10-30 RX ADMIN — LACTULOSE 20 G: 20 SOLUTION ORAL at 17:49

## 2023-10-30 RX ADMIN — OXYCODONE HYDROCHLORIDE 10 MG: 5 TABLET ORAL at 11:25

## 2023-10-30 RX ADMIN — DULOXETINE HYDROCHLORIDE 20 MG: 20 CAPSULE, DELAYED RELEASE ORAL at 08:58

## 2023-10-30 RX ADMIN — FOLIC ACID 1 MG: 1 TABLET ORAL at 08:58

## 2023-10-30 RX ADMIN — OXYCODONE HYDROCHLORIDE 10 MG: 5 TABLET ORAL at 01:06

## 2023-10-30 RX ADMIN — HYDROMORPHONE HYDROCHLORIDE 0.6 MG: 1 INJECTION, SOLUTION INTRAMUSCULAR; INTRAVENOUS; SUBCUTANEOUS at 20:47

## 2023-10-30 RX ADMIN — Medication: at 08:48

## 2023-10-30 RX ADMIN — HYDROMORPHONE HYDROCHLORIDE 0.6 MG: 1 INJECTION, SOLUTION INTRAMUSCULAR; INTRAVENOUS; SUBCUTANEOUS at 02:36

## 2023-10-30 RX ADMIN — METOPROLOL TARTRATE 25 MG: 25 TABLET, FILM COATED ORAL at 20:48

## 2023-10-30 RX ADMIN — WARFARIN SODIUM 7.5 MG: 7.5 TABLET ORAL at 17:49

## 2023-10-30 ASSESSMENT — COGNITIVE AND FUNCTIONAL STATUS - GENERAL
MOBILITY SCORE: 24
MOBILITY SCORE: 24
DAILY ACTIVITIY SCORE: 24
DAILY ACTIVITIY SCORE: 24

## 2023-10-30 ASSESSMENT — PAIN - FUNCTIONAL ASSESSMENT
PAIN_FUNCTIONAL_ASSESSMENT: 0-10

## 2023-10-30 ASSESSMENT — PAIN SCALES - GENERAL
PAINLEVEL_OUTOF10: 8
PAINLEVEL_OUTOF10: 10 - WORST POSSIBLE PAIN
PAINLEVEL_OUTOF10: 8
PAINLEVEL_OUTOF10: 8
PAINLEVEL_OUTOF10: 7
PAINLEVEL_OUTOF10: 7
PAINLEVEL_OUTOF10: 8
PAINLEVEL_OUTOF10: 10 - WORST POSSIBLE PAIN

## 2023-10-30 NOTE — PROGRESS NOTES
"Senait Narvaez is a 53 y.o. female on day 2 of admission presenting with Sickle cell disease with crisis and other complication (CMS/HCC).    Subjective   Patient seen this morning  at bedside, continues to have pain in her joints, and back. Now c/o leg edema.  Denies SOB, CP, heart palpitations, N/V/D/C, fever/chills.     Objective     Physical Exam  Constitutional:       Appearance: Normal appearance.   HENT:      Head: Normocephalic.      Nose: Nose normal.   Eyes:      Pupils: Pupils are equal, round, and reactive to light.   Cardiovascular:      Rate and Rhythm: Normal rate and regular rhythm.   Pulmonary:      Effort: Pulmonary effort is normal.   Abdominal:      General: Bowel sounds are normal.   Musculoskeletal:         General: Normal range of motion.      Cervical back: Normal range of motion.   Neurological:      General: No focal deficit present.      Mental Status: She is alert and oriented to person, place, and time.   Psychiatric:         Mood and Affect: Mood normal.         Behavior: Behavior normal.         Last Recorded Vitals  Blood pressure 104/52, pulse 57, temperature 36.3 °C (97.3 °F), temperature source Temporal, resp. rate 18, height 1.65 m (5' 4.96\"), weight 110 kg (241 lb 9.6 oz), SpO2 97 %.  Intake/Output last 3 Shifts:  I/O last 3 completed shifts:  In: - (0 mL/kg)   Out: 50 (0.5 mL/kg) [Urine:50 (0 mL/kg/hr)]  Weight: 109.6 kg     Relevant Results           This patient currently has cardiac telemetry ordered; if you would like to modify or discontinue the telemetry order, click here to go to the orders activity to modify/discontinue the order.  This patient has a central line   Reason for the central line remaining today?    Assessment/Plan   Principal Problem:    Sickle cell disease with crisis and other complication (CMS/HCC)  Active Problems:    Chronic pain    DVT (deep venous thrombosis) (CMS/HCC)    Sickle cell anemia without crisis (CMS/HCC)    Sickle cell crisis " (CMS/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 Pt has had complications with past outpatient exchanges; including SVT following line placement requiring admission and hypoxia/lethargy following exchange found to have new pHTN. Patient is now admitted for observation following apheresis line placement and RBCEx. Patient had femoral line placed by IR on 10/26 and had red cell exchange on 10/27. Her outpatient labs done prior to admit. During admission, concerns fro SLE d/t pain and CHARLES on CKD. Rheumatology consulted 10/27 with workup in progress.      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  - NOT in pain crisis, admitted for observation following line/red cell exchange  - For pain management will rotate IV Dilaudid 0.6mg q3 hrs PRN with Oxycodone 10mg q3hrs PRN   - Bowel regimen for opioid-induced constipation, lactulose added    - Continue home Duloxetine 20mg daily, PO Zofran PRN, Folic Acid 1mg daily, and MVI daily  - S/P apheresis line placement on 10/26, plan to remove 10/29  - S/P RBCEx on 10/27  - Exchange labs done, HgbS 27.1%, HgbC 26.4%       # Concerns for autoimmune disease   - Low suspicion for SLE per rheum   - Had labs done outpatient that shows positive ISABELLE with low titire dsDNA  - Xray hands neg for acute process    - f/u C3 172, C4 34  - ESR 16, CRP 0.98  - 24 urine study  done  - Rheumatology consulted 10/27, recs appreciated      # New pHTN   - 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 Yi and Dr. 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 Cr 2.4 on admit. 10/29 1.35  - 10/28 gentle IV hydration X12 hrs    - Renal US 10/27 unremarkable   - UA/urine culture, urine electrolyte ordered for eval-unremarkable   - Recommend to avoid contrast and NSAIDS  - Follows with nephrology otpt     # Hx SVT  - Baseline admission EKG ordered  - Echo (6/29) EF 60-65%  - Continue home Metoprolol Succs 25mg daily  - Follows with Cardiology outpt  - Telemetry ordered     # DVT/ PE/ SVC Thrombus  - Hx SVC stricture s/p SVC angioplasty  - B/l Upper Extremity and Facial Swelling d/t partial filling defect within the SVC and a thrombus of the SVC complicated by bilateral Mediport catheters. On lifelong anticoagulation, DOAC discouraged due to high risk of clotting.   - Take lasix 20 mg every other day, Will start daily for trace leg edema   - Continue home Coumadin 5mg daily   - Caution with fluids  - 10/28 INR subtherapeutic. Will cont  7.5 mg Warfarin X 3 days and reassess. Pharmacy to help with dosing    - Monitor with INR daily, 10/30 INR 1.5    - Follows with Coumadin clinic outpatient, has home INR monitor      # CAN  - Continue home CPAP   - Continue home Albuterol PRN     # H/O Cauda Equine syndrome  - Follows Dr. Delacruz     # DISPO:  - Full Code,  confirmed on admit  - DC home with resumed O2 pending rheumatology mccloud  - FUV with sickle cell clinic on discharge          I spent 60 minutes in the professional and overall care of this patient.      Leanne Gonzales PA-C

## 2023-10-30 NOTE — CARE PLAN
The patient's goals for the shift include      The clinical goals for the shift include pt will report pain less than 7/10 throughout shift on 10/30/23 by 1900      Problem: Pain - Adult  Goal: Verbalizes/displays adequate comfort level or baseline comfort level  Outcome: Progressing     Problem: Safety - Adult  Goal: Free from fall injury  Outcome: Progressing     Problem: Discharge Planning  Goal: Discharge to home or other facility with appropriate resources  Outcome: Progressing     Problem: Chronic Conditions and Co-morbidities  Goal: Patient's chronic conditions and co-morbidity symptoms are monitored and maintained or improved  Outcome: Progressing     Problem: Pain  Goal: Takes deep breaths with improved pain control throughout the shift  Outcome: Progressing  Goal: Turns in bed with improved pain control throughout the shift  Outcome: Progressing  Goal: Walks with improved pain control throughout the shift  Outcome: Progressing  Goal: Performs ADL's with improved pain control throughout shift  Outcome: Progressing  Goal: Participates in PT with improved pain control throughout the shift  Outcome: Progressing  Goal: Free from opioid side effects throughout the shift  Outcome: Progressing  Goal: Free from acute confusion related to pain meds throughout the shift  Outcome: Progressing   Pt remained safe and free from injury during shift, VSS. Pt rotated between oxy and dilaudid to manage generalized pain during shift.

## 2023-10-30 NOTE — PROGRESS NOTES
"Spiritual Care Visit    Clinical Encounter Type  Visited With: Patient  Routine Visit: Introduction  Continue Visiting: Yes    Taxonomy  Intended Effects: Establish rapport and connectedness, Demonstrate caring and concern  Methods: Encourage self reflection, Encourage story-telling, Offer support  Interventions: Active listening, Discuss coping mechanisms with someone, Manuel Lyle introduced self and Spiritual Care services to patient Senait \"Zehra\" Solange. Patient shared that she feels comforted by her komal and her family. She began attending her aunt's and uncle's [employer]ian[employer] in [place] and feels welcomed by the community.  held space for patient to reflect on her father and his life, and on the rest of her family. Patient shared she has a good friend who checks in on her every day and who has been supportive. Patient shared she enjoys music, reading, and practicing meditation.     Patient expressed she would welcome art and music therapy services;  made referrals.  provided support through compassionate presence, active listening, supportive conversation, and prayer. Patient was appreciative of visit and did not have any further needs at this time. Spiritual Care remains available as needed/requested.    Rev. Anita Pandya MDiv, BCC  "

## 2023-10-30 NOTE — PROGRESS NOTES
"Senait Narvaez is a 53 y.o. female on day 2 of admission presenting with Sickle cell disease with crisis and other complication (CMS/HCC).    Subjective   Similar complaints of generalized body aches which sound mechanical in nature  Old hx of Raynaud's disease from when she was young  No other features of autoimmune disease    Objective   Vitals:    10/30/23 0848   BP: 104/52   Pulse: 57   Resp: 18   Temp: 36.3 °C (97.3 °F)   SpO2: 97%      Physical Exam  Physical Exam  General: Cooperative, in NAD   Eyes: Conjunctiva clear, sclera white, anicteric   Nose: No external deformity, no mucosal crusting or signs of bleeding   Throat/Mouth: Moist mucous membranes, normal dentition, no ulcers or lesions   Lungs: No respiratory distress; lungs CTAB; no wheezes, rales, rhonchi, or stridor   Heart: Normal S1 and S2; regular rate and rhythm; no murmurs, rubs, or gallops  Skin: No rashes, ulcers, tophi, or nodules noted  MSK:   Spine: Normal posture, no point tenderness to palpation, normal ROM  Upper Extremities:   Hands: No erythema, warmth, synovitis, or pain of the DIPs, PIPs, or MCPs; normal ROM  Wrists: No erythema, warmth, synovitis, or pain of the wrists; normal ROM  Elbows: No erythema, warmth, synovitis, or pain; normal ROM   Shoulders: No erythema, warmth, appreciable swelling, or pain; normal ROM   Lower Extremities:   Hips: No gross deformity, erythema, warmth, or pain; normal ROM   Knees: No erythema, warmth, swelling, or pain; normal ROM   Ankles: No erythema, warmth, synovitis, or pain; normal ROM  Feet: No gross deformity, erythema, or swelling; MTP squeeze negative bilaterally   Last Recorded Vitals  Blood pressure 104/52, pulse 57, temperature 36.3 °C (97.3 °F), temperature source Temporal, resp. rate 18, height 1.65 m (5' 4.96\"), weight 110 kg (241 lb 9.6 oz), SpO2 97 %.  Intake/Output last 3 Shifts:  I/O last 3 completed shifts:  In: - (0 mL/kg)   Out: 50 (0.5 mL/kg) [Urine:50 (0 mL/kg/hr)]  Weight: 109.6 kg "     Results for orders placed or performed during the hospital encounter of 10/26/23 (from the past 24 hour(s))   Protein, Total 24 Hour Urine   Result Value Ref Range    Collection period 24 hrs    Urine Volume 850 mL    Total Protein, Urine 13 5 - 24 mg/dL    Total Protein,  24 Hour Urine 111 mg/24h    Creatinine, Urine 128.4 20.0 - 320.0 mg/dL    Creatinine, 24 Hour Urine 1.09 g/24 h   Sterile Cup   Result Value Ref Range    Extra Tube Hold for add-ons.    Coagulation Screen   Result Value Ref Range    Protime 13.7 (H) 9.8 - 12.8 seconds    INR 1.2 (H) 0.9 - 1.1    aPTT 28 27 - 38 seconds   Comprehensive Metabolic Panel   Result Value Ref Range    Glucose 84 74 - 99 mg/dL    Sodium 139 136 - 145 mmol/L    Potassium 4.8 3.5 - 5.3 mmol/L    Chloride 105 98 - 107 mmol/L    Bicarbonate 30 21 - 32 mmol/L    Anion Gap 9 (L) 10 - 20 mmol/L    Urea Nitrogen 19 6 - 23 mg/dL    Creatinine 1.06 (H) 0.50 - 1.05 mg/dL    eGFR 63 >60 mL/min/1.73m*2    Calcium 8.4 (L) 8.6 - 10.6 mg/dL    Albumin 3.2 (L) 3.4 - 5.0 g/dL    Alkaline Phosphatase 99 33 - 110 U/L    Total Protein 5.8 (L) 6.4 - 8.2 g/dL    AST 8 (L) 9 - 39 U/L    Bilirubin, Total 0.8 0.0 - 1.2 mg/dL    ALT 5 (L) 7 - 45 U/L   Lactate Dehydrogenase   Result Value Ref Range     84 - 246 U/L   Coagulation Screen   Result Value Ref Range    Protime 17.5 (H) 9.8 - 12.8 seconds    INR 1.5 (H) 0.9 - 1.1    aPTT 31 27 - 38 seconds   CBC   Result Value Ref Range    WBC 7.8 4.4 - 11.3 x10*3/uL    nRBC 1.2 (H) 0.0 - 0.0 /100 WBCs    RBC 3.72 (L) 4.00 - 5.20 x10*6/uL    Hemoglobin 9.4 (L) 12.0 - 16.0 g/dL    Hematocrit 29.2 (L) 36.0 - 46.0 %    MCV 79 (L) 80 - 100 fL    MCH 25.3 (L) 26.0 - 34.0 pg    MCHC 32.2 32.0 - 36.0 g/dL    RDW 26.1 (H) 11.5 - 14.5 %    Platelets 193 150 - 450 x10*3/uL    MPV 9.3 7.5 - 11.5 fL   Reticulocytes   Result Value Ref Range    Retic % 5.8 (H) 0.5 - 2.0 %    Retic Absolute 0.215 (H) 0.018 - 0.083 x10*6/uL    Reticulocyte Hemoglobin 26 (L) 28  - 38 pg    Immature Retic fraction 29.7 (H) <=16.0 %             Assessment/Plan   Principal Problem:    Sickle cell disease with crisis and other complication (CMS/[facility])  Active Problems:    Chronic pain    DVT (deep venous thrombosis) (CMS/HCC)    Sickle cell anemia without crisis (CMS/HCC)    Sickle cell crisis (CMS/HCC)      # Generalized body aches   Patient does not meet criteria for Lupus  ISABELLE and dsDNA low titre are most probably false positive results  Normal ESR, normal complements, bland urine.  No evidence of serositis no other evidence of end organ involvement concerning for lupus.  Has mechanical pain in her hands and lower back and rest of widespread pain is possibly from pain centralization ( although she does not meet the full criteria for fibromyalgia)  Hand xrays reviewed no evidence of inflammatory arthrits  APS serology negative as well  CHARLES is improving and is now near normal Cr with bland urine  Rheumatology is signing off    Outpatient pain can be managed by PCP or pain medicine    I spent 35 minutes in the professional and overall care of this patient.    Case seen and discussed with Dr Nova  Plan not final until countersigned by attending  Miguel Ángel Rice MD

## 2023-10-31 LAB
ALBUMIN SERPL BCP-MCNC: 3.2 G/DL (ref 3.4–5)
ALP SERPL-CCNC: 103 U/L (ref 33–110)
ALT SERPL W P-5'-P-CCNC: 6 U/L (ref 7–45)
ANION GAP SERPL CALC-SCNC: 10 MMOL/L (ref 10–20)
APTT PPP: 36 SECONDS (ref 27–38)
AST SERPL W P-5'-P-CCNC: 12 U/L (ref 9–39)
BILIRUB SERPL-MCNC: 0.7 MG/DL (ref 0–1.2)
BUN SERPL-MCNC: 14 MG/DL (ref 6–23)
CALCIUM SERPL-MCNC: 8.5 MG/DL (ref 8.6–10.6)
CHLORIDE SERPL-SCNC: 105 MMOL/L (ref 98–107)
CO2 SERPL-SCNC: 30 MMOL/L (ref 21–32)
CREAT SERPL-MCNC: 1.03 MG/DL (ref 0.5–1.05)
ERYTHROCYTE [DISTWIDTH] IN BLOOD BY AUTOMATED COUNT: 25.7 % (ref 11.5–14.5)
GFR SERPL CREATININE-BSD FRML MDRD: 65 ML/MIN/1.73M*2
GLUCOSE SERPL-MCNC: 79 MG/DL (ref 74–99)
HCT VFR BLD AUTO: 29.3 % (ref 36–46)
HEMOGLOBIN A2: 3 % (ref 2–3.5)
HEMOGLOBIN A: 71.3 % (ref 95.8–98)
HEMOGLOBIN C: 12.5 %
HEMOGLOBIN F: 0.7 % (ref 0–2)
HEMOGLOBIN IDENTIFICATION INTERPRETATION: ABNORMAL
HEMOGLOBIN S: 12.5 %
HGB BLD-MCNC: 9.1 G/DL (ref 12–16)
HGB RETIC QN: 25 PG (ref 28–38)
IMMATURE RETIC FRACTION: 24.2 %
INR PPP: 2 (ref 0.9–1.1)
LDH SERPL L TO P-CCNC: 219 U/L (ref 84–246)
MCH RBC QN AUTO: 24.6 PG (ref 26–34)
MCHC RBC AUTO-ENTMCNC: 31.1 G/DL (ref 32–36)
MCV RBC AUTO: 79 FL (ref 80–100)
NRBC BLD-RTO: 0.4 /100 WBCS (ref 0–0)
PATH REVIEW-HGB IDENTIFICATION: ABNORMAL
PLATELET # BLD AUTO: 239 X10*3/UL (ref 150–450)
PMV BLD AUTO: 9.5 FL (ref 7.5–11.5)
POTASSIUM SERPL-SCNC: 4.8 MMOL/L (ref 3.5–5.3)
PROT SERPL-MCNC: 5.7 G/DL (ref 6.4–8.2)
PROTHROMBIN TIME: 23 SECONDS (ref 9.8–12.8)
RBC # BLD AUTO: 3.7 X10*6/UL (ref 4–5.2)
RETICS #: 0.2 X10*6/UL (ref 0.02–0.08)
RETICS/RBC NFR AUTO: 5.4 % (ref 0.5–2)
SODIUM SERPL-SCNC: 140 MMOL/L (ref 136–145)
WBC # BLD AUTO: 7 X10*3/UL (ref 4.4–11.3)

## 2023-10-31 PROCEDURE — 36415 COLL VENOUS BLD VENIPUNCTURE: CPT

## 2023-10-31 PROCEDURE — 85045 AUTOMATED RETICULOCYTE COUNT: CPT | Performed by: PHYSICIAN ASSISTANT

## 2023-10-31 PROCEDURE — 1270000001 HC SEMI-PRIVATE ONCOLOGY ROOM DAILY

## 2023-10-31 PROCEDURE — 85027 COMPLETE CBC AUTOMATED: CPT | Performed by: PHYSICIAN ASSISTANT

## 2023-10-31 PROCEDURE — 2500000004 HC RX 250 GENERAL PHARMACY W/ HCPCS (ALT 636 FOR OP/ED): Performed by: PHYSICIAN ASSISTANT

## 2023-10-31 PROCEDURE — 2500000001 HC RX 250 WO HCPCS SELF ADMINISTERED DRUGS (ALT 637 FOR MEDICARE OP): Performed by: PHYSICIAN ASSISTANT

## 2023-10-31 PROCEDURE — 36415 COLL VENOUS BLD VENIPUNCTURE: CPT | Performed by: PHYSICIAN ASSISTANT

## 2023-10-31 PROCEDURE — 83615 LACTATE (LD) (LDH) ENZYME: CPT | Performed by: PHYSICIAN ASSISTANT

## 2023-10-31 PROCEDURE — 80053 COMPREHEN METABOLIC PANEL: CPT | Performed by: PHYSICIAN ASSISTANT

## 2023-10-31 PROCEDURE — 94660 CPAP INITIATION&MGMT: CPT

## 2023-10-31 PROCEDURE — 1200000002 HC GENERAL ROOM WITH TELEMETRY DAILY

## 2023-10-31 PROCEDURE — 85610 PROTHROMBIN TIME: CPT

## 2023-10-31 PROCEDURE — 99233 SBSQ HOSP IP/OBS HIGH 50: CPT | Performed by: PHYSICIAN ASSISTANT

## 2023-10-31 RX ORDER — OXYCODONE HYDROCHLORIDE 5 MG/1
15 TABLET ORAL
Status: DISCONTINUED | OUTPATIENT
Start: 2023-10-31 | End: 2023-11-01 | Stop reason: HOSPADM

## 2023-10-31 RX ORDER — LACTULOSE 10 G/15ML
20 SOLUTION ORAL 4 TIMES DAILY
Status: DISCONTINUED | OUTPATIENT
Start: 2023-10-31 | End: 2023-11-01 | Stop reason: HOSPADM

## 2023-10-31 RX ORDER — HYDROMORPHONE HYDROCHLORIDE 1 MG/ML
0.6 INJECTION, SOLUTION INTRAMUSCULAR; INTRAVENOUS; SUBCUTANEOUS ONCE
Status: COMPLETED | OUTPATIENT
Start: 2023-10-31 | End: 2023-10-31

## 2023-10-31 RX ADMIN — OXYCODONE HYDROCHLORIDE 10 MG: 5 TABLET ORAL at 02:37

## 2023-10-31 RX ADMIN — HYDROMORPHONE HYDROCHLORIDE 0.6 MG: 1 INJECTION, SOLUTION INTRAMUSCULAR; INTRAVENOUS; SUBCUTANEOUS at 10:01

## 2023-10-31 RX ADMIN — OXYCODONE HYDROCHLORIDE 10 MG: 5 TABLET ORAL at 08:56

## 2023-10-31 RX ADMIN — OXYCODONE HYDROCHLORIDE 15 MG: 5 TABLET ORAL at 18:19

## 2023-10-31 RX ADMIN — LACTULOSE 20 G: 20 SOLUTION ORAL at 10:02

## 2023-10-31 RX ADMIN — DOCUSATE SODIUM 100 MG: 100 CAPSULE, LIQUID FILLED ORAL at 12:12

## 2023-10-31 RX ADMIN — HYDROMORPHONE HYDROCHLORIDE 0.6 MG: 1 INJECTION, SOLUTION INTRAMUSCULAR; INTRAVENOUS; SUBCUTANEOUS at 15:20

## 2023-10-31 RX ADMIN — TRAZODONE HYDROCHLORIDE 50 MG: 50 TABLET ORAL at 20:22

## 2023-10-31 RX ADMIN — FOLIC ACID 1 MG: 1 TABLET ORAL at 08:59

## 2023-10-31 RX ADMIN — OXYCODONE HYDROCHLORIDE 15 MG: 5 TABLET ORAL at 12:12

## 2023-10-31 RX ADMIN — HYDROMORPHONE HYDROCHLORIDE 0.6 MG: 1 INJECTION, SOLUTION INTRAMUSCULAR; INTRAVENOUS; SUBCUTANEOUS at 21:29

## 2023-10-31 RX ADMIN — DULOXETINE HYDROCHLORIDE 20 MG: 20 CAPSULE, DELAYED RELEASE ORAL at 08:59

## 2023-10-31 RX ADMIN — WARFARIN SODIUM 5 MG: 5 TABLET ORAL at 20:22

## 2023-10-31 RX ADMIN — LACTULOSE 20 G: 20 SOLUTION ORAL at 18:19

## 2023-10-31 RX ADMIN — HYDROMORPHONE HYDROCHLORIDE 0.6 MG: 1 INJECTION, SOLUTION INTRAMUSCULAR; INTRAVENOUS; SUBCUTANEOUS at 05:34

## 2023-10-31 RX ADMIN — LACTULOSE 20 G: 20 SOLUTION ORAL at 20:22

## 2023-10-31 ASSESSMENT — PAIN SCALES - GENERAL
PAINLEVEL_OUTOF10: 8
PAINLEVEL_OUTOF10: 7
PAINLEVEL_OUTOF10: 7
PAINLEVEL_OUTOF10: 8
PAINLEVEL_OUTOF10: 9
PAINLEVEL_OUTOF10: 6
PAINLEVEL_OUTOF10: 8
PAINLEVEL_OUTOF10: 6
PAINLEVEL_OUTOF10: 8
PAINLEVEL_OUTOF10: 7
PAINLEVEL_OUTOF10: 8
PAINLEVEL_OUTOF10: 6
PAINLEVEL_OUTOF10: 6

## 2023-10-31 ASSESSMENT — COGNITIVE AND FUNCTIONAL STATUS - GENERAL
DAILY ACTIVITIY SCORE: 24
DAILY ACTIVITIY SCORE: 24
MOBILITY SCORE: 24
MOBILITY SCORE: 24

## 2023-10-31 ASSESSMENT — PAIN - FUNCTIONAL ASSESSMENT

## 2023-10-31 ASSESSMENT — PAIN DESCRIPTION - DESCRIPTORS
DESCRIPTORS: ACHING
DESCRIPTORS: THROBBING

## 2023-10-31 NOTE — CARE PLAN
The patient's goals for the shift include      The clinical goals for the shift include Pt will state a decrease in pain by end of shift 10/31/23 1900      Problem: Pain - Adult  Goal: Verbalizes/displays adequate comfort level or baseline comfort level  Outcome: Progressing     Problem: Safety - Adult  Goal: Free from fall injury  Outcome: Progressing     Problem: Discharge Planning  Goal: Discharge to home or other facility with appropriate resources  Outcome: Progressing     Problem: Chronic Conditions and Co-morbidities  Goal: Patient's chronic conditions and co-morbidity symptoms are monitored and maintained or improved  Outcome: Progressing     Problem: Pain  Goal: Takes deep breaths with improved pain control throughout the shift  Outcome: Progressing  Goal: Turns in bed with improved pain control throughout the shift  Outcome: Progressing  Goal: Walks with improved pain control throughout the shift  Outcome: Progressing  Goal: Performs ADL's with improved pain control throughout shift  Outcome: Progressing  Goal: Free from opioid side effects throughout the shift  Outcome: Progressing  Goal: Free from acute confusion related to pain meds throughout the shift  Outcome: Progressing     Pt continues to progress towards goals.

## 2023-10-31 NOTE — CARE PLAN
The clinical goals for the shift include Patient will rate pain at 6/10 or less to at least one assessment by end of shift.    Problem: Pain - Adult  Goal: Verbalizes/displays adequate comfort level or baseline comfort level  Outcome: Progressing     Problem: Safety - Adult  Goal: Free from fall injury  Outcome: Progressing     Problem: Discharge Planning  Goal: Discharge to home or other facility with appropriate resources  Outcome: Progressing     Problem: Chronic Conditions and Co-morbidities  Goal: Patient's chronic conditions and co-morbidity symptoms are monitored and maintained or improved  Outcome: Progressing     Problem: Pain  Goal: Takes deep breaths with improved pain control throughout the shift  Outcome: Progressing  Goal: Turns in bed with improved pain control throughout the shift  Outcome: Progressing  Goal: Walks with improved pain control throughout the shift  Outcome: Progressing  Goal: Performs ADL's with improved pain control throughout shift  Outcome: Progressing  Goal: Free from opioid side effects throughout the shift  Outcome: Progressing  Goal: Free from acute confusion related to pain meds throughout the shift  Outcome: Progressing

## 2023-10-31 NOTE — PROGRESS NOTES
"Senait Marshall is a 53 y.o. female on day 3 of admission presenting with Sickle cell disease with crisis and other complication (CMS/HCC).    Subjective   Patient seen this morning  at bedside, doing better but still having pain, we discussed that she does not have lupus and she was relief by this news. We discussed following up with outpatient sickle cell team for better  chronic pain management.   Denies SOB, CP, heart palpitations, N/V/D/C, fever/chills.     Objective       Last Recorded Vitals  Blood pressure 97/64, pulse 57, temperature 36.3 °C (97.3 °F), resp. rate 16, height 1.65 m (5' 4.96\"), weight 110 kg (241 lb 9.6 oz), SpO2 97 %.  Intake/Output last 3 Shifts:  No intake/output data recorded.    Relevant Results           This patient currently has cardiac telemetry ordered; if you would like to modify or discontinue the telemetry order, click here to go to the orders activity to modify/discontinue the order.  This patient has a central line   Reason for the central line remaining today?    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 Pt has had complications with past outpatient exchanges; including SVT following line placement requiring admission and hypoxia/lethargy following exchange found to have new pHTN. Patient is now admitted for observation following apheresis line placement and RBCEx. Patient had femoral line placed by IR on " 10/26 and had red cell exchange on 10/27. Her outpatient labs done prior to admit. During admission, concerns fro SLE d/t pain and CHARLES on CKD. Rheumatology consulted 10/27 with workup negative for lupus.      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  - NOT in pain crisis, admitted for observation following line/red cell exchange  - For pain management will rotate IV Dilaudid 0.6mg q3 hrs PRN with Oxycodone 15mg q3hrs PRN   - Bowel regimen for opioid-induced constipation, lactulose added    - Continue home Duloxetine 20mg daily, PO Zofran PRN, Folic Acid 1mg daily, and MVI daily  - S/P apheresis line placement on 10/26, plan to remove 10/29  - S/P RBCEx on 10/27  - Exchange labs done, HgbS 27.1%, HgbC 26.4%       # Concerns for autoimmune disease   - Per rheum patient does not meet criteria for Lupus, ISABELLE and dsDNA low titre are most probably false positive results  - Xray hands neg for acute process    - f/u C3 172, C4 34  - ESR 16, CRP 0.98  - 24 urine study done  - Rheumatology consulted, recs appreciated, now signed off     # New pHTN   - 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 Yi and Dr. 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 Cr 2.4 on admit. Now at baseline.   - 10/28 gentle IV hydration X12 hrs    - Renal US 10/27 unremarkable   - UA/urine culture, urine electrolyte ordered for eval-unremarkable   - Recommend to avoid contrast and NSAIDS  - Follows with nephrology otpt     # Hx SVT  - Baseline admission EKG ordered  - Echo (6/29) EF 60-65%  - Continue home Metoprolol Succs 25mg daily. Held for bradycardia. Patient advised to check her HR at home and hold BB id HR<60 bpm.   - Follows with Cardiology outpt  - Telemetry ordered     # DVT/ PE/ SVC Thrombus  - Hx SVC stricture s/p SVC angioplasty  - B/l Upper Extremity and Facial Swelling d/t partial filling defect within the SVC and a thrombus of the SVC complicated by bilateral Mediport catheters. On lifelong anticoagulation, DOAC discouraged due to high risk of clotting.   - Take lasix 20 mg every other day, Will start daily for trace leg edema   - Continue home Coumadin 5mg daily 10/31  - Caution with fluids  - 10/28 INR subtherapeutic. Will cont  7.5 mg Warfarin X 3 days and reassess.   - Monitor with INR daily, 10/31 INR 2   - Follows with Coumadin clinic outpatient, has home INR monitor      # CAN  - Continue home CPAP   - Continue home Albuterol PRN     # H/O Cauda Equine syndrome  - Follows Dr. Delacruz     # DISPO:  - Full Code, confirmed on admit  - DC home with resumed O2 on 11/1   - FUV with sickle cell clinic on discharge, an apt was requested for 1 week post discharge      I spent 60 minutes in the professional and overall care of this patient.      Leanne Gonzales PA-C

## 2023-11-01 VITALS
SYSTOLIC BLOOD PRESSURE: 104 MMHG | HEART RATE: 58 BPM | BODY MASS INDEX: 40.25 KG/M2 | TEMPERATURE: 96.4 F | WEIGHT: 241.6 LBS | OXYGEN SATURATION: 95 % | RESPIRATION RATE: 16 BRPM | HEIGHT: 65 IN | DIASTOLIC BLOOD PRESSURE: 55 MMHG

## 2023-11-01 LAB
ALBUMIN SERPL BCP-MCNC: 3 G/DL (ref 3.4–5)
ALP SERPL-CCNC: 104 U/L (ref 33–110)
ALT SERPL W P-5'-P-CCNC: 3 U/L (ref 7–45)
ANION GAP SERPL CALC-SCNC: 10 MMOL/L (ref 10–20)
APTT PPP: 40 SECONDS (ref 27–38)
AST SERPL W P-5'-P-CCNC: 11 U/L (ref 9–39)
BILIRUB SERPL-MCNC: 0.8 MG/DL (ref 0–1.2)
BUN SERPL-MCNC: 11 MG/DL (ref 6–23)
CALCIUM SERPL-MCNC: 9.3 MG/DL (ref 8.6–10.6)
CHLORIDE SERPL-SCNC: 103 MMOL/L (ref 98–107)
CO2 SERPL-SCNC: 31 MMOL/L (ref 21–32)
CREAT SERPL-MCNC: 0.91 MG/DL (ref 0.5–1.05)
ERYTHROCYTE [DISTWIDTH] IN BLOOD BY AUTOMATED COUNT: 25 % (ref 11.5–14.5)
GFR SERPL CREATININE-BSD FRML MDRD: 75 ML/MIN/1.73M*2
GLUCOSE SERPL-MCNC: 77 MG/DL (ref 74–99)
HCT VFR BLD AUTO: 29.6 % (ref 36–46)
HGB BLD-MCNC: 9.3 G/DL (ref 12–16)
HGB RETIC QN: 22 PG (ref 28–38)
IMMATURE RETIC FRACTION: 16.5 %
INR PPP: 2.4 (ref 0.9–1.1)
LDH SERPL L TO P-CCNC: 171 U/L (ref 84–246)
MCH RBC QN AUTO: 24.6 PG (ref 26–34)
MCHC RBC AUTO-ENTMCNC: 31.4 G/DL (ref 32–36)
MCV RBC AUTO: 78 FL (ref 80–100)
NRBC BLD-RTO: 14.2 /100 WBCS (ref 0–0)
PLATELET # BLD AUTO: 240 X10*3/UL (ref 150–450)
PMV BLD AUTO: 10.1 FL (ref 7.5–11.5)
POTASSIUM SERPL-SCNC: 4.4 MMOL/L (ref 3.5–5.3)
PROT SERPL-MCNC: 6.4 G/DL (ref 6.4–8.2)
PROTHROMBIN TIME: 27.6 SECONDS (ref 9.8–12.8)
RBC # BLD AUTO: 3.78 X10*6/UL (ref 4–5.2)
RETICS #: 0.22 X10*6/UL (ref 0.02–0.08)
RETICS/RBC NFR AUTO: 5.8 % (ref 0.5–2)
SODIUM SERPL-SCNC: 140 MMOL/L (ref 136–145)
WBC # BLD AUTO: 7.7 X10*3/UL (ref 4.4–11.3)

## 2023-11-01 PROCEDURE — 80053 COMPREHEN METABOLIC PANEL: CPT | Performed by: PHYSICIAN ASSISTANT

## 2023-11-01 PROCEDURE — 99239 HOSP IP/OBS DSCHRG MGMT >30: CPT | Performed by: STUDENT IN AN ORGANIZED HEALTH CARE EDUCATION/TRAINING PROGRAM

## 2023-11-01 PROCEDURE — 36415 COLL VENOUS BLD VENIPUNCTURE: CPT

## 2023-11-01 PROCEDURE — 84075 ASSAY ALKALINE PHOSPHATASE: CPT | Performed by: PHYSICIAN ASSISTANT

## 2023-11-01 PROCEDURE — 2500000001 HC RX 250 WO HCPCS SELF ADMINISTERED DRUGS (ALT 637 FOR MEDICARE OP): Performed by: PHYSICIAN ASSISTANT

## 2023-11-01 PROCEDURE — 85027 COMPLETE CBC AUTOMATED: CPT | Performed by: PHYSICIAN ASSISTANT

## 2023-11-01 PROCEDURE — 94660 CPAP INITIATION&MGMT: CPT

## 2023-11-01 PROCEDURE — 85045 AUTOMATED RETICULOCYTE COUNT: CPT | Performed by: PHYSICIAN ASSISTANT

## 2023-11-01 PROCEDURE — 36415 COLL VENOUS BLD VENIPUNCTURE: CPT | Performed by: PHYSICIAN ASSISTANT

## 2023-11-01 PROCEDURE — 2500000004 HC RX 250 GENERAL PHARMACY W/ HCPCS (ALT 636 FOR OP/ED): Performed by: PHYSICIAN ASSISTANT

## 2023-11-01 PROCEDURE — 85610 PROTHROMBIN TIME: CPT

## 2023-11-01 PROCEDURE — 83615 LACTATE (LD) (LDH) ENZYME: CPT | Performed by: PHYSICIAN ASSISTANT

## 2023-11-01 RX ORDER — SYRING-NEEDL,DISP,INSUL,0.3 ML 29 G X1/2"
296 SYRINGE, EMPTY DISPOSABLE MISCELLANEOUS ONCE
Status: DISCONTINUED | OUTPATIENT
Start: 2023-11-01 | End: 2023-11-01 | Stop reason: HOSPADM

## 2023-11-01 RX ADMIN — FOLIC ACID 1 MG: 1 TABLET ORAL at 09:52

## 2023-11-01 RX ADMIN — OXYCODONE HYDROCHLORIDE 15 MG: 5 TABLET ORAL at 00:38

## 2023-11-01 RX ADMIN — METOPROLOL TARTRATE 25 MG: 25 TABLET, FILM COATED ORAL at 09:52

## 2023-11-01 RX ADMIN — FUROSEMIDE 20 MG: 20 TABLET ORAL at 09:52

## 2023-11-01 RX ADMIN — HYDROMORPHONE HYDROCHLORIDE 0.6 MG: 1 INJECTION, SOLUTION INTRAMUSCULAR; INTRAVENOUS; SUBCUTANEOUS at 09:52

## 2023-11-01 RX ADMIN — LACTULOSE 20 G: 20 SOLUTION ORAL at 09:51

## 2023-11-01 RX ADMIN — FLUTICASONE PROPIONATE 2 SPRAY: 50 SPRAY, METERED NASAL at 09:52

## 2023-11-01 RX ADMIN — OXYCODONE HYDROCHLORIDE 15 MG: 5 TABLET ORAL at 13:00

## 2023-11-01 RX ADMIN — OXYCODONE HYDROCHLORIDE 15 MG: 5 TABLET ORAL at 06:56

## 2023-11-01 RX ADMIN — HYDROMORPHONE HYDROCHLORIDE 0.6 MG: 1 INJECTION, SOLUTION INTRAMUSCULAR; INTRAVENOUS; SUBCUTANEOUS at 03:45

## 2023-11-01 RX ADMIN — DULOXETINE HYDROCHLORIDE 20 MG: 20 CAPSULE, DELAYED RELEASE ORAL at 09:52

## 2023-11-01 ASSESSMENT — PAIN - FUNCTIONAL ASSESSMENT
PAIN_FUNCTIONAL_ASSESSMENT: 0-10

## 2023-11-01 ASSESSMENT — COGNITIVE AND FUNCTIONAL STATUS - GENERAL
DAILY ACTIVITIY SCORE: 24
MOBILITY SCORE: 24

## 2023-11-01 ASSESSMENT — PAIN SCALES - GENERAL
PAINLEVEL_OUTOF10: 8
PAINLEVEL_OUTOF10: 8
PAINLEVEL_OUTOF10: 7
PAINLEVEL_OUTOF10: 8

## 2023-11-01 NOTE — CARE PLAN
The clinical goals for the shift include Patient will rate pain at 6/10 or less at least once by end of shift.    Problem: Pain - Adult  Goal: Verbalizes/displays adequate comfort level or baseline comfort level  Outcome: Progressing     Problem: Safety - Adult  Goal: Free from fall injury  Outcome: Progressing     Problem: Discharge Planning  Goal: Discharge to home or other facility with appropriate resources  Outcome: Progressing     Problem: Chronic Conditions and Co-morbidities  Goal: Patient's chronic conditions and co-morbidity symptoms are monitored and maintained or improved  Outcome: Progressing     Problem: Pain  Goal: Takes deep breaths with improved pain control throughout the shift  Outcome: Progressing  Goal: Turns in bed with improved pain control throughout the shift  Outcome: Progressing  Goal: Walks with improved pain control throughout the shift  Outcome: Progressing  Goal: Performs ADL's with improved pain control throughout shift  Outcome: Progressing  Goal: Free from opioid side effects throughout the shift  Outcome: Progressing  Goal: Free from acute confusion related to pain meds throughout the shift  Outcome: Progressing

## 2023-11-01 NOTE — DISCHARGE INSTRUCTIONS
Lance Sapp,    You were admitted for an exchange transfusion. While you were admitted, we evaluated you for autoimmune disease and found that you DO NOT have lupus.   Please follow up with your sickle cell providers. Please keep track of your heart rate at home, we noticed some lower heart rates while you were in the hospital. If your heart rates are consistently low or you feel light headed or dizzy, please reach out to your heart doctor about stopping or adjusting your metoprolol.     Please take miralax when you go home to help with your constipation.     -Your care team

## 2023-11-01 NOTE — DISCHARGE SUMMARY
"Discharge Diagnosis  Sickle cell disease  History of SVC syndrome  Recurrent VTE  Cauda equina syndrome with saddle numbness  Raynaud's disease  CAN    Issues Requiring Follow-Up  [ ] follow up with sickle cell clinic for continued care  [ ] follow up with pulmonology for pulmonary HTN     Test Results Pending At Discharge  Pending Labs       Order Current Status    Opiate/Opioid/Benzo Prescription Compliance In process    Opiate/Opioid/Benzo Prescription Compliance Confirmation In process    Extra Tubes Preliminary result    Sterile Cup Preliminary result            Hospital Course  Senait Narvaez is a 53 year old Female with 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 Pt has had complications with past outpatient exchanges; including SVT following line placement requiring admission and hypoxia/lethargy following exchange found to have new pHTN. Patient had femoral line placed by IR on 10/26 and had red cell exchange on 10/27. During admission, concerns fro SLE d/t pain and CHARLES on CKD. Rheumatology consulted 10/27 with workup was ultimately negative for lupus. Her pain was thought to be mechanical in her hands and low back with wide spread pain potentially due to pain centralization however not meeting full criteria for fibromyalgia. She was stable for discharge home on 11/1. Follow up requested for sickle cell clinic and new referral to pulmonology for continued workup/management of pulmonary hypertension.     /55 (BP Location: Right arm, Patient Position: Lying)   Pulse 58   Temp 35.8 °C (96.4 °F) (Tympanic)   Resp 16   Ht 1.65 m (5' 4.96\")   Wt 110 kg (241 lb 9.6 oz)   SpO2 95%   BMI 40.25 kg/m²      Pertinent Physical Exam At Time " of Discharge  General: alert, conversant, in no apparent distress  HEENT: normocephalic, atraumatic, EOMI, no scleral icterus  CV: RRR, no murmurs  Pulm: CTAB, no wheezes or crackles, no increased work of breathing  Abd: soft, non tender, non distended  : no baker   Ext: warm, trace lower extremity edema  Skin: no rashes  Neuro: moving all extremities  Psych: normal affect      Home Medications     Medication List      CONTINUE taking these medications     albuterol 90 mcg/actuation inhaler   docusate sodium 100 mg capsule; Commonly known as: Colace   DULoxetine 20 mg DR capsule; Commonly known as: Cymbalta   folic acid 1 mg tablet; Commonly known as: Folvite   furosemide 20 mg tablet; Commonly known as: Lasix   loratadine 10 mg tablet; Commonly known as: Claritin   metoprolol tartrate 25 mg tablet; Commonly known as: Lopressor   mometasone 50 mcg/actuation nasal spray; Commonly known as: Nasonex   multivitamin tablet   ondansetron 4 mg tablet; Commonly known as: Zofran   oxyCODONE 10 mg immediate release tablet; Commonly known as: Roxicodone;   Take 1 tablet (10 mg) by mouth every 4 hours if needed for severe pain (7   - 10) (pain).   traZODone 50 mg tablet; Commonly known as: Desyrel   warfarin 5 mg tablet; Commonly known as: Coumadin; Take as directed. If   you are unsure how to take this medication, talk to your nurse or doctor.       Outpatient Follow-Up  Future Appointments   Date Time Provider Department Center   1/11/2024  1:40 PM Clive Jorge MD [id number]   7/15/2024 10:20 AM Ankur White DO [id number]       Shelley Rodriguez MD

## 2023-11-01 NOTE — HOSPITAL COURSE
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 Pt has had complications with past outpatient exchanges; including SVT following line placement requiring admission and hypoxia/lethargy following exchange found to have new pHTN. Patient had femoral line placed by IR on 10/26 and had red cell exchange on 10/27. During admission, concerns fro SLE d/t pain and CHARLES on CKD. Rheumatology consulted 10/27 with workup was ultimately negative for lupus. Her pain was thought to be mechanical in her hands and low back with wide spread pain potentially due to pain centralization however not meeting full criteria for fibromyalgia. She was stable for discharge home on 11/1. Follow up requested for sickle cell clinic and new referral to pulmonology for continued workup/management of pulmonary hypertension.

## 2023-11-02 LAB
1OH-MIDAZOLAM UR CFM-MCNC: <25 NG/ML
6MAM UR CFM-MCNC: <25 NG/ML
7AMINOCLONAZEPAM UR CFM-MCNC: <25 NG/ML
A-OH ALPRAZ UR CFM-MCNC: <25 NG/ML
ALPRAZ UR CFM-MCNC: <25 NG/ML
CHLORDIAZEP UR CFM-MCNC: <25 NG/ML
CLONAZEPAM UR CFM-MCNC: <25 NG/ML
CODEINE UR CFM-MCNC: <50 NG/ML
DIAZEPAM UR CFM-MCNC: <25 NG/ML
EDDP UR CFM-MCNC: <25 NG/ML
FENTANYL UR CFM-MCNC: <2.5 NG/ML
HYDROCODONE CTO UR CFM-MCNC: <25 NG/ML
HYDROMORPHONE UR CFM-MCNC: 444 NG/ML
LORAZEPAM UR CFM-MCNC: <25 NG/ML
METHADONE UR CFM-MCNC: <25 NG/ML
MIDAZOLAM UR CFM-MCNC: <25 NG/ML
MORPHINE UR CFM-MCNC: <50 NG/ML
NORDIAZEPAM UR CFM-MCNC: <25 NG/ML
NORFENTANYL UR CFM-MCNC: <2.5 NG/ML
NORHYDROCODONE UR CFM-MCNC: <25 NG/ML
NOROXYCODONE UR CFM-MCNC: >1000 NG/ML
NORTRAMADOL UR-MCNC: <50 NG/ML
OXAZEPAM UR CFM-MCNC: <25 NG/ML
OXYCODONE UR CFM-MCNC: >2500 NG/ML
OXYMORPHONE UR CFM-MCNC: >2500 NG/ML
TEMAZEPAM UR CFM-MCNC: <25 NG/ML
TRAMADOL UR CFM-MCNC: <50 NG/ML
ZOLPIDEM UR CFM-MCNC: <25 NG/ML
ZOLPIDEM UR-MCNC: <25 NG/ML

## 2023-11-03 ENCOUNTER — ANTICOAGULATION - WARFARIN VISIT (OUTPATIENT)
Dept: CARDIOLOGY | Facility: CLINIC | Age: 53
End: 2023-11-03
Payer: COMMERCIAL

## 2023-11-03 DIAGNOSIS — I26.99 RECURRENT PULMONARY EMBOLISM (MULTI): ICD-10-CM

## 2023-11-03 DIAGNOSIS — I82.210 THROMBOSIS OF SUPERIOR VENA CAVA (MULTI): ICD-10-CM

## 2023-11-03 DIAGNOSIS — I82.4Y9 DEEP VEIN THROMBOSIS (DVT) OF PROXIMAL LOWER EXTREMITY, UNSPECIFIED CHRONICITY, UNSPECIFIED LATERALITY (MULTI): Primary | ICD-10-CM

## 2023-11-03 LAB
INR IN PPP BY COAGULATION ASSAY EXTERNAL: 3
PROTHROMBIN TIME (PT) IN PPP BY COAGULATION ASSAY EXTERNAL: NORMAL SECONDS

## 2023-11-03 NOTE — PROGRESS NOTES
Patient identification verified with 2 identifiers.    Location: [facility] Patient Self-Testing Program [phone]    Referring Physician: Dr. Patti Gross  Enrollment/ Re-enrollment date: 10/18/23   INR Goal: 2.0-3.0  INR monitoring is per [facility] protocol.  Anticoagulation Medication: warfarin  Indication: DVT, PE    Subjective   Bleeding signs/symptoms: No    Bruising: No   Major bleeding event: No  Thrombosis signs/symptoms: No  Thromboembolic event: No  Missed doses: No  Extra doses: No  Medication changes: No  Dietary changes: No  Change in health: No  Change in activity: No  Alcohol: No  Other concerns: No    Upcoming Surgeries:  Does the Patient Have any upcoming surgeries that require interruption in anticoagulation therapy? no  Does the patient require bridging? no      Anticoagulation Summary  As of 11/3/2023      INR goal:  2.0-3.0   TTR:  100.0 % (4 d)   INR used for dosing:  3.00 (11/3/2023)   Weekly warfarin total:  35 mg               Assessment/Plan   Therapeutic     1. New dose: no change    2. Next INR: 1 week    Received faxed INR self-test results and called patient. Pt identification verified with 2 pt identifiers. Current dose schedule reviewed with patient, patient verbalized understanding. Pt instructed to call in interim with questions, concerns or changes.  ARMANI Krishna RN    Education provided to patient during the visit:  Patient instructed to call in interim with questions, concerns and changes.

## 2023-11-07 ENCOUNTER — TELEPHONE (OUTPATIENT)
Dept: HEMATOLOGY/ONCOLOGY | Facility: HOSPITAL | Age: 53
End: 2023-11-07
Payer: COMMERCIAL

## 2023-11-07 DIAGNOSIS — D57.00 SICKLE CELL CRISIS (MULTI): ICD-10-CM

## 2023-11-07 RX ORDER — OXYCODONE HYDROCHLORIDE 10 MG/1
10 TABLET ORAL EVERY 4 HOURS PRN
Qty: 75 TABLET | Refills: 0 | Status: SHIPPED | OUTPATIENT
Start: 2023-11-07 | End: 2023-11-22 | Stop reason: SDUPTHER

## 2023-11-07 ASSESSMENT — PATIENT HEALTH QUESTIONNAIRE - PHQ9
5. POOR APPETITE OR OVEREATING: SEVERAL DAYS
6. FEELING BAD ABOUT YOURSELF - OR THAT YOU ARE A FAILURE OR HAVE LET YOURSELF OR YOUR FAMILY DOWN: NOT AT ALL
2. FEELING DOWN, DEPRESSED, IRRITABLE, OR HOPELESS: NOT AT ALL
4. FEELING TIRED OR HAVING LITTLE ENERGY: SEVERAL DAYS
SUM OF ALL RESPONSES TO PHQ QUESTIONS 1-9: 3
1. LITTLE INTEREST OR PLEASURE IN DOING THINGS: 0
8. MOVING OR SPEAKING SO SLOWLY THAT OTHER PEOPLE COULD HAVE NOTICED. OR THE OPPOSITE, BEING SO FIGETY OR RESTLESS THAT YOU HAVE BEEN MOVING AROUND A LOT MORE THAN USUAL: NOT AT ALL
3. TROUBLE FALLING OR STAYING ASLEEP OR SLEEPING TOO MUCH: SEVERAL DAYS
9. THOUGHTS THAT YOU WOULD BE BETTER OFF DEAD, OR OF HURTING YOURSELF: 0
10. IF YOU CHECKED OFF ANY PROBLEMS, HOW DIFFICULT HAVE THESE PROBLEMS MADE IT FOR YOU TO DO YOUR WORK, TAKE CARE OF THINGS AT HOME, OR GET ALONG WITH OTHER PEOPLE: SOMEWHAT DIFFICULT
4. FEELING TIRED OR HAVING LITTLE ENERGY: 1
2. FEELING DOWN, DEPRESSED OR HOPELESS: NOT AT ALL
4. FEELING TIRED OR HAVING LITTLE ENERGY: SEVERAL DAYS
1. LITTLE INTEREST OR PLEASURE IN DOING THINGS: NOT AT ALL
2. FEELING DOWN, DEPRESSED, IRRITABLE, OR HOPELESS: 0
3. TROUBLE FALLING OR STAYING ASLEEP OR SLEEPING TOO MUCH: 1
SUM OF ALL RESPONSES TO PHQ QUESTIONS 1-9: 3
9. THOUGHTS THAT YOU WOULD BE BETTER OFF DEAD, OR OF HURTING YOURSELF: NOT AT ALL
9. THOUGHTS THAT YOU WOULD BE BETTER OFF DEAD, OR OF HURTING YOURSELF: NOT AT ALL
7. TROUBLE CONCENTRATING ON THINGS, SUCH AS READING THE NEWSPAPER OR WATCHING TELEVISION: NOT AT ALL
1. LITTLE INTEREST OR PLEASURE IN DOING THINGS: NOT AT ALL
8. MOVING OR SPEAKING SO SLOWLY THAT OTHER PEOPLE COULD HAVE NOTICED. OR THE OPPOSITE, BEING SO FIGETY OR RESTLESS THAT YOU HAVE BEEN MOVING AROUND A LOT MORE THAN USUAL: 0
6. FEELING BAD ABOUT YOURSELF - OR THAT YOU ARE A FAILURE OR HAVE LET YOURSELF OR YOUR FAMILY DOWN: NOT AT ALL
3. TROUBLE FALLING OR STAYING ASLEEP OR SLEEPING TOO MUCH: SEVERAL DAYS
10. IF YOU CHECKED OFF ANY PROBLEMS, HOW DIFFICULT HAVE THESE PROBLEMS MADE IT FOR YOU TO DO YOUR WORK, TAKE CARE OF THINGS AT HOME, OR GET ALONG WITH OTHER PEOPLE: SOMEWHAT DIFFICULT
5. POOR APPETITE OR OVEREATING: 1
5. POOR APPETITE OR OVEREATING: SEVERAL DAYS
6. FEELING BAD ABOUT YOURSELF - OR THAT YOU ARE A FAILURE OR HAVE LET YOURSELF OR YOUR FAMILY DOWN: 0
7. TROUBLE CONCENTRATING ON THINGS, SUCH AS READING THE NEWSPAPER OR WATCHING TELEVISION: NOT AT ALL
8. MOVING OR SPEAKING SO SLOWLY THAT OTHER PEOPLE COULD HAVE NOTICED. OR THE OPPOSITE, BEING SO FIGETY OR RESTLESS THAT YOU HAVE BEEN MOVING AROUND A LOT MORE THAN USUAL: NOT AT ALL
7. TROUBLE CONCENTRATING ON THINGS, SUCH AS READING THE NEWSPAPER OR WATCHING TELEVISION: 0

## 2023-11-07 NOTE — TELEPHONE ENCOUNTER
Refill request received for Oxycodone 10mg IR  Preferred pharmacy [facility] [id number]  Message sent to Sickle Cell team.

## 2023-11-08 ENCOUNTER — OFFICE VISIT (OUTPATIENT)
Dept: HEMATOLOGY/ONCOLOGY | Facility: HOSPITAL | Age: 53
End: 2023-11-08
Payer: COMMERCIAL

## 2023-11-08 VITALS
TEMPERATURE: 95.7 F | SYSTOLIC BLOOD PRESSURE: 105 MMHG | OXYGEN SATURATION: 94 % | HEART RATE: 71 BPM | BODY MASS INDEX: 39.95 KG/M2 | DIASTOLIC BLOOD PRESSURE: 60 MMHG | RESPIRATION RATE: 18 BRPM | WEIGHT: 239.8 LBS

## 2023-11-08 DIAGNOSIS — D57.1 SICKLE-CELL DISEASE WITHOUT CRISIS (MULTI): Primary | ICD-10-CM

## 2023-11-08 DIAGNOSIS — D57.09 SICKLE CELL DISEASE WITH CRISIS AND OTHER COMPLICATION (MULTI): ICD-10-CM

## 2023-11-08 DIAGNOSIS — M79.89 SWELLING OF UPPER EXTREMITY: ICD-10-CM

## 2023-11-08 PROCEDURE — 99215 OFFICE O/P EST HI 40 MIN: CPT | Performed by: PEDIATRICS

## 2023-11-08 PROCEDURE — 3008F BODY MASS INDEX DOCD: CPT | Performed by: PEDIATRICS

## 2023-11-08 PROCEDURE — 1036F TOBACCO NON-USER: CPT | Performed by: PEDIATRICS

## 2023-11-08 RX ORDER — ONDANSETRON 4 MG/1
4 TABLET, FILM COATED ORAL EVERY 8 HOURS PRN
Qty: 20 TABLET | Refills: 2 | Status: SHIPPED | OUTPATIENT
Start: 2023-11-08 | End: 2024-05-02 | Stop reason: SDUPTHER

## 2023-11-08 RX ORDER — TRAZODONE HYDROCHLORIDE 50 MG/1
50 TABLET ORAL NIGHTLY PRN
Qty: 30 TABLET | Refills: 0 | Status: SHIPPED | OUTPATIENT
Start: 2023-11-08 | End: 2024-01-16 | Stop reason: SDUPTHER

## 2023-11-08 ASSESSMENT — PAIN SCALES - GENERAL: PAINLEVEL: 0-NO PAIN

## 2023-11-09 ASSESSMENT — ENCOUNTER SYMPTOMS
VOMITING: 0
TROUBLE SWALLOWING: 0
FEVER: 0
APPETITE CHANGE: 0
CONSTIPATION: 0
WHEEZING: 0
DYSURIA: 0
BRUISES/BLEEDS EASILY: 0
HEADACHES: 0
WOUND: 0
LIGHT-HEADEDNESS: 0
ADENOPATHY: 0
ARTHRALGIAS: 1
DIZZINESS: 0
NAUSEA: 0
DIARRHEA: 0
BLOOD IN STOOL: 0
SHORTNESS OF BREATH: 0
PALPITATIONS: 0
HEMATURIA: 0
COUGH: 0
CHILLS: 0
UNEXPECTED WEIGHT CHANGE: 0
CHEST TIGHTNESS: 0
FATIGUE: 1
ABDOMINAL PAIN: 0
BACK PAIN: 1

## 2023-11-09 NOTE — PROGRESS NOTES
Primary Care Provider: Eric Wei MD  Visit Type: Follow Up    Assessment/Plan    Senait Narvaez is a 53 year old with   1. History of Hb SC SCD and chronic pain. She presents with acute on chronic pain which is generalized and rates it at 6/10.   - oral tylenol as needed for mild to moderate pain   - oral oxycodone 10-20 mg every 4-6 hours as needed for moderate to severe and breakthrough pain   - oral duloxetine for chronic pain and also anxiety/depression. Will hold off increasing dose because she gets a bit jittery. She has been on gabapentin in the past but that gives her diarrhea   - reviewed OARRS and there were no abnormal or concerning opioid prescriptions.   - discussed non pharmacologic methods of pain control     2. Encounter for DMT with full rbc exchange every 5-6 weeks. Indication is MSOF and chronic pain. She last received this on 10/27/23. Senait continues to have increased pain for several days post exchange transfusion. Although she had an elevated ISABELLE, rheumatology does not think she has lupus. She has very poor venous access and using femoral veins for apheresis   - we will continue full exchange transfusions through this winter and will space how out to every 6 weeks instead of 5 weeks. Exchange transfusions will continue to be performed under telemetry on account of recurrent arrhythmia   - follow up with rheumatology in 6 months     3. History of recurrent VTE/PE with SVC syndrome, on life long anticoagulation with warfarin   - continue oral warfarin 5 mg daily with target INR of 2-3     4.  History of CAN on CPAP with 2L of supplemental oxygen    7. Chronic anemia secondary to SCD    - continue daily folic acid     8. Chronic constipation   - colase and miralax as needed     9. History of tachycardia and arrhythmia/SVT, well controlled   - metoprolol as prescribed     10. Follow up will be for her next exchange transfusion          Subjective    Senait is present with for follow up of SCD with  her mother. She last had her full rbc exchange on 10/27/23. This went well but has continued to have increased sickle cell pain for the past week and she is just starting to feel better. She is typically admitted for rbc exchange which is performed under telemetry  because of history of recurrent cardiac arrhythmia. She complains about generalized aches and pain which she attributes to her sickle cell pain. Pain is rated at 6/10 and continues to take 1-2 tablets of her oral oxycodone 10 mg tablets.   Facial and neck swelling are all unchanged from prior. She remains on warfarin as anticoagulation for recurrent VTE. She is tolerating this without increased bruising or bleeding. Her INR has been therapeutic between 2-3. She denies palpitations, chest pain or chest tightness SOB, headaches, dizziness, nausea or vomiting. Bronchial asthma has been well controlled. She is afebrile. She is stooling well with current laxatives. She has been compliant with supplemental oxygen 2L at night and has been compliant    Review of Systems   Constitutional:  Positive for fatigue. Negative for appetite change, chills, fever and unexpected weight change.   HENT:   Negative for mouth sores, nosebleeds and trouble swallowing.    Respiratory:  Negative for chest tightness, cough, shortness of breath and wheezing.    Cardiovascular:  Negative for chest pain and palpitations.   Gastrointestinal:  Negative for abdominal pain, blood in stool, constipation, diarrhea, nausea and vomiting.   Genitourinary:  Negative for dysuria and hematuria.    Musculoskeletal:  Positive for arthralgias and back pain.   Skin:  Negative for rash and wound.   Neurological:  Negative for dizziness, headaches and light-headedness.   Hematological:  Negative for adenopathy. Does not bruise/bleed easily.      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 Has been  evaluated for BMT but donor was considered to be too high risk.   Recurrent DVT/PE with lifelong anticoagulation on coumadin  Cauda equina with saddle numbness, history of bowel/bladder incontinence  Chronic right hip pain, secondary to AVN    History of acute chest syndrome.   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   Obstructive Sleep and significant nocturnal hemoglobin desaturation    Bilateral lower extremity edema, recurrent  SVC syndrome S/P balloon angioplasty of SVC/left brachiocephalic vein, removal of left sided Mediport catheter (1/21/20)  Syncopal episodes   Balloon removed via IR on April 2023.   SVT event with conversion with 1 dose 6mg of adenosine on 5/5/23  Admission June 2023 underwent RHC/LHC on 6/16 with mPA pressure 36, wedge 14, CI 4.2 and her coronary angiogram revealed no angiographic evidence of obstructive CAD. Dx of pulm HTN.     Family History   Problem Relation    Diabetes Father    Gout Father    Sickle cell trait Father    Seizures Sister    Diabetes Other    Uterine cancer Other    Other (congenital blindness) Cousin     Oncology History    No history exists.       Senait Solange  reports that she has quit smoking. Her smoking use included cigarettes. She has been exposed to tobacco smoke. She has never used smokeless tobacco.  She  reports that she does not currently use alcohol.  She  reports that she does not currently use drugs.    Physical Exam  Constitutional:       Appearance: She is normal weight.   HENT:      Mouth/Throat:      Mouth: Mucous membranes are moist.   Neck:      Vascular: No carotid bruit.      Comments: Bilateral swelling of the neck from SVC syndrome  Cardiovascular:      Rate and Rhythm: Normal rate and regular rhythm.      Pulses: Normal pulses.      Heart sounds: Murmur heard.   Pulmonary:      Breath sounds: Normal breath sounds. No stridor. No wheezing or rales.   Abdominal:      Palpations: Abdomen is soft.      Tenderness:  There is no abdominal tenderness. There is no guarding.   Neurological:      Mental Status: She is alert.   Psychiatric:         Mood and Affect: Mood normal.       LABS    Reviewed all relevant recent labs

## 2023-11-13 ENCOUNTER — ANTICOAGULATION - WARFARIN VISIT (OUTPATIENT)
Dept: CARDIOLOGY | Facility: CLINIC | Age: 53
End: 2023-11-13
Payer: COMMERCIAL

## 2023-11-13 DIAGNOSIS — I82.210 THROMBOSIS OF SUPERIOR VENA CAVA (MULTI): ICD-10-CM

## 2023-11-13 DIAGNOSIS — I82.4Y9 DEEP VEIN THROMBOSIS (DVT) OF PROXIMAL LOWER EXTREMITY, UNSPECIFIED CHRONICITY, UNSPECIFIED LATERALITY (MULTI): Primary | ICD-10-CM

## 2023-11-13 DIAGNOSIS — I26.99 RECURRENT PULMONARY EMBOLISM (MULTI): ICD-10-CM

## 2023-11-13 LAB
INR IN PPP BY COAGULATION ASSAY EXTERNAL: 2.5
PROTHROMBIN TIME (PT) IN PPP BY COAGULATION ASSAY EXTERNAL: NORMAL SECONDS

## 2023-11-13 NOTE — PROGRESS NOTES
Patient identification verified with 2 identifiers.    Location: [facility] Patient Self-Testing Program [phone]    Referring Physician: Dr. Patti Gross  Enrollment/ Re-enrollment date: 10/18/23   INR Goal: 2.0-3.0  INR monitoring is per [facility] protocol.  Anticoagulation Medication: warfarin  Indication: DVT, PE    Subjective   Bleeding signs/symptoms: No    Bruising: No   Major bleeding event: No  Thrombosis signs/symptoms: No  Thromboembolic event: No  Missed doses: No  Extra doses: No  Medication changes: No  Dietary changes: No  Change in health: No  Change in activity: No  Alcohol: No  Other concerns: No    Upcoming Surgeries:  Does the Patient Have any upcoming surgeries that require interruption in anticoagulation therapy? no  Does the patient require bridging? no      Anticoagulation Summary  As of 2023      INR goal:  2.0-3.0   TTR:  100.0 % (2 wk)   INR used for dosin.50 (2023)   Weekly warfarin total:  35 mg               Assessment/Plan   Therapeutic     1. New dose: no change    2. Next INR: 2 weeks      Education provided to patient during the visit:  Patient instructed to call in interim with questions, concerns and changes.

## 2023-11-14 ENCOUNTER — ANTICOAGULATION - WARFARIN VISIT (OUTPATIENT)
Dept: CARDIOLOGY | Facility: CLINIC | Age: 53
End: 2023-11-14
Payer: COMMERCIAL

## 2023-11-14 DIAGNOSIS — I26.99 RECURRENT PULMONARY EMBOLISM (MULTI): ICD-10-CM

## 2023-11-14 DIAGNOSIS — I82.210 THROMBOSIS OF SUPERIOR VENA CAVA (MULTI): ICD-10-CM

## 2023-11-14 DIAGNOSIS — I82.4Y9 DEEP VEIN THROMBOSIS (DVT) OF PROXIMAL LOWER EXTREMITY, UNSPECIFIED CHRONICITY, UNSPECIFIED LATERALITY (MULTI): Primary | ICD-10-CM

## 2023-11-15 DIAGNOSIS — D57.00 SICKLE CELL DISEASE WITH CRISIS (MULTI): Primary | ICD-10-CM

## 2023-11-22 ENCOUNTER — TELEPHONE (OUTPATIENT)
Dept: ADMISSION | Facility: HOSPITAL | Age: 53
End: 2023-11-22
Payer: COMMERCIAL

## 2023-11-22 ENCOUNTER — TELEPHONE (OUTPATIENT)
Dept: HEMATOLOGY/ONCOLOGY | Facility: HOSPITAL | Age: 53
End: 2023-11-22

## 2023-11-22 DIAGNOSIS — D57.00 SICKLE CELL CRISIS (MULTI): ICD-10-CM

## 2023-11-22 RX ORDER — OXYCODONE HYDROCHLORIDE 10 MG/1
10 TABLET ORAL EVERY 4 HOURS PRN
Qty: 75 TABLET | Refills: 0 | Status: SHIPPED | OUTPATIENT
Start: 2023-11-22 | End: 2023-12-06 | Stop reason: SDUPTHER

## 2023-11-27 ENCOUNTER — ANTICOAGULATION - WARFARIN VISIT (OUTPATIENT)
Dept: CARDIOLOGY | Facility: CLINIC | Age: 53
End: 2023-11-27
Payer: COMMERCIAL

## 2023-11-27 ENCOUNTER — HOSPITAL ENCOUNTER (OUTPATIENT)
Dept: RADIOLOGY | Facility: HOSPITAL | Age: 53
Discharge: HOME | End: 2023-11-27
Payer: COMMERCIAL

## 2023-11-27 DIAGNOSIS — N63.25 UNSPECIFIED LUMP IN THE LEFT BREAST, OVERLAPPING QUADRANTS: ICD-10-CM

## 2023-11-27 DIAGNOSIS — I82.210 THROMBOSIS OF SUPERIOR VENA CAVA (MULTI): ICD-10-CM

## 2023-11-27 DIAGNOSIS — I26.99 RECURRENT PULMONARY EMBOLISM (MULTI): ICD-10-CM

## 2023-11-27 DIAGNOSIS — D57.00 SICKLE CELL DISEASE WITH CRISIS (MULTI): ICD-10-CM

## 2023-11-27 DIAGNOSIS — I82.4Y9 DEEP VEIN THROMBOSIS (DVT) OF PROXIMAL LOWER EXTREMITY, UNSPECIFIED CHRONICITY, UNSPECIFIED LATERALITY (MULTI): Primary | ICD-10-CM

## 2023-11-27 LAB
INR IN PPP BY COAGULATION ASSAY EXTERNAL: 2.2
PROTHROMBIN TIME (PT) IN PPP BY COAGULATION ASSAY EXTERNAL: NORMAL SECONDS

## 2023-11-27 PROCEDURE — 76642 ULTRASOUND BREAST LIMITED: CPT | Mod: LEFT SIDE | Performed by: RADIOLOGY

## 2023-11-27 PROCEDURE — 76982 USE 1ST TARGET LESION: CPT | Mod: LT

## 2023-11-27 PROCEDURE — 76642 ULTRASOUND BREAST LIMITED: CPT | Mod: LT

## 2023-11-27 NOTE — PROGRESS NOTES
Patient identification verified with 2 identifiers.    Location: [facility] Patient Self-Testing Program [phone]    Referring Physician: Dr. Patti Gross; BOOGIE Watt  Enrollment/ Re-enrollment date: 24  INR Goal: 2.0-3.0  INR monitoring is per [facility] protocol.  Anticoagulation Medication: warfarin  Indication: DVT, PE    Subjective   Bleeding signs/symptoms: No  Bruising: No   Major bleeding event: No  Thrombosis signs/symptoms: No  Thromboembolic event: No  Missed doses: No  Extra doses: No  Medication changes: No  Dietary changes: No  Change in health: No  Change in activity: No  Alcohol: No  Other concerns: No    Upcoming Surgeries:  Does the Patient Have any upcoming surgeries that require interruption in anticoagulation therapy? no  Does the patient require bridging? no      Anticoagulation Summary  As of 2023      INR goal:  2.0-3.0   TTR:  100.0 % (4 wk)   INR used for dosin.20 (2023)   Weekly warfarin total:  35 mg               Assessment/Plan   Therapeutic     1. New dose: no change    2. Next INR: 2 weeks      Education provided to patient during the visit:  Patient instructed to call in interim with questions, concerns and changes.   Patient educated on interactions between medications and warfarin.   Patient educated on dietary consistency in vitamin k consumption.   Patient educated on affects of alcohol consumption while taking warfarin.   Patient educated on signs of bleeding/clotting.   Patient educated on compliance with dosing, follow up appointments, and prescribed plan of care.

## 2023-11-29 DIAGNOSIS — D57.00 SICKLE CELL DISEASE WITH CRISIS (MULTI): Primary | ICD-10-CM

## 2023-12-01 NOTE — PROGRESS NOTES
Patient ID: Senait Narvaez is a 53 y.o. female.  Referring Physician: No referring provider defined for this encounter.  Primary Care Provider: Eric Wei MD  Visit Type: Follow Up      Subjective    HPI  Senait is not feeling well today, has not felt well for her the whole month after her exchange. She has pain all over her body, describes as a throbbing that is constant. Pain is worse in her legs. Just had pain in her back and shot down her right leg. Gabapentin gave her bad diarrhea and she doenst want to try again. Pain starts bad in the morning and she gets up slowly, doesn't improve or get worse. Feels her pain medication has been ineffective. Pain is a 9/10, took 20mg oxycodone at 10pm, didn't fall asleep. She would like to go [facility] today. Considering a break from exchanges in the future.      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 Has been evaluated for BMT but donor was considered to be too high risk.   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   History of acute chest syndrome.   Question of fibromyalgia.   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   Obstructive Sleep and significant nocturnal hemoglobin desaturation (greater than 40% of sleep time with an SaO2 of less than 90%).   Bilateral lower extremity edema, recurrent  SVC syndrome: She was diagnosed with SVC syndrome, she had Bilateral Upper Extremity and Facial Swelling d/t partial filling defect within the SVC and a thrombus of the SVC complicated by bilateral Mediport catheters. Vasc med consulted, rec lifelong coumadin. She was on heparin drip bridge and coumadin was initiated on 1/16. INR 3.0 on 1/29/2019.  She is status post Venogram, SVC balloon angioplasty and Right mediport removal (1/15/20) and status post placement of left IJ temporary apharesis catheter, SVC angiogram, Balloon angioplasty of SVC/left brachiocephalic vein, Removal of left sided Mediport catheter (1/21/20). She was given IV diuretics lasix 2/26-29 for UE edema 2/2 SVC syndrome with edema. Breast remain swollen recommended breast binder. SVC in Nov 2022 with stent placed.   Syncopal episodes - referred to Neuro   11/22 hospitalization for SVC stricture; s/p SVC angioplasty, which was successful.  FUV in IR 12/9/22.   Bilateral Upper Extremity and Facial Swelling d/t partial filling defect within the SVC and a thrombus of the SVC complicated by bilateral Mediport catheters.   Balloon removed via IR on April 2023.   SVT event with conversion with 1 dose 6mg of adenosine on 5/5/23  Admission June 2023 underwent RHC/LHC on 6/16 with mPA pressure 36, wedge 14, CI 4.2 and her coronary angiogram revealed no angiographic evidence of obstructive CAD. Dx of pulm HTN.     Review of Systems   Constitutional:  Positive for appetite change and fatigue.   Eyes: Negative.    Cardiovascular: Negative.    Gastrointestinal:  Positive for nausea.   Endocrine: Positive for hot flashes.   Genitourinary: Negative.     Neurological:  Positive for dizziness.   Hematological: Negative.    Psychiatric/Behavioral: Negative.          Objective   BSA: There is no height or weight on file to calculate BSA.  There were no vitals taken for this visit.     has a past medical history of Asthma, CHF (congestive heart failure) (CMS/[facility]), Chronic pain disorder, Compression of vein (02/19/2020), and Hypotension, unspecified (12/10/2020).   has a past surgical history that includes Appendectomy (08/05/2013); Cholecystectomy (08/05/2013); Other surgical history (05/13/2014); Other surgical history (10/09/2014); Other surgical history (06/09/2014); MR angio head wo IV contrast (8/16/2014);  MR angio neck wo IV contrast (8/16/2014); IR CVC tunneled (3/13/2015); IR CVC tunneled (1/29/2016); IR CVC tunneled (1/17/2018); IR CVC tunneled (2/22/2018); IR CVC tunneled (3/22/2018); IR CVC tunneled (4/18/2018); IR CVC tunneled (5/16/2018); IR CVC tunneled (6/12/2018); US guided biopsy lymph node superficial (9/17/2019); CT guided percutaneous biopsy LYMPH node superficial (9/19/2019); IR CVC tunneled (1/21/2020); IR CVC tunneled (2/28/2020); IR CVC tunneled (4/10/2020); IR CVC tunneled (5/22/2020); IR CVC tunneled (6/19/2020); IR CVC tunneled (7/23/2020); IR CVC tunneled (9/4/2020); IR CVC tunneled (10/9/2020); IR CVC tunneled (11/13/2020); IR CVC tunneled (12/18/2020); IR CVC tunneled (1/22/2021); IR CVC tunneled (2/26/2021); IR CVC tunneled (4/2/2021); IR CVC tunneled (5/7/2021); IR CVC tunneled (6/11/2021); IR CVC tunneled (7/16/2021); IR CVC tunneled (8/20/2021); IR CVC tunneled (9/24/2021); IR CVC tunneled (10/29/2021); IR CVC tunneled (12/3/2021); IR CVC tunneled (1/7/2022); IR CVC tunneled (2/23/2022); IR CVC tunneled (3/25/2022); IR CVC tunneled (4/22/2022); IR CVC tunneled (6/3/2022); IR CVC tunneled (9/18/2017); IR CVC tunneled (10/30/2017); IR CVC tunneled (12/19/2017); IR CVC tunneled (1/6/2023); IR CVC tunneled (2/24/2023); IR CVC tunneled (7/8/2022); IR CVC tunneled (8/12/2022); IR CVC tunneled (9/16/2022); CT angio neck w and wo IV contrast (10/19/2022); IR CVC tunneled (10/20/2022); IR CVC tunneled (11/29/2022); IR CVC tunneled (3/31/2023); IR CVC tunneled (6/9/2023); IR CVC tunneled (7/20/2023); IR CVC tunneled (8/16/2023); and IR CVC tunneled (9/21/2023).  Family History   Problem Relation Name Age of Onset    Diabetes Father      Gout Father      Sickle cell trait Father      Seizures Sister      Diabetes Other      Uterine cancer Other      Other (congenital blindness) Cousin     Grandfather had RA   Oncology History    No history exists.       Senait Solange  reports that she has quit smoking.  Her smoking use included cigarettes. She has been exposed to tobacco smoke. She has never used smokeless tobacco.  She  reports that she does not currently use alcohol.  She  reports that she does not currently use drugs.    Physical Exam  Constitutional:       Appearance: She is ill-appearing.   HENT:      Head: Normocephalic.      Right Ear: Tympanic membrane normal.      Left Ear: Tympanic membrane normal.      Nose: Nose normal.   Eyes:      Conjunctiva/sclera: Conjunctivae normal.   Cardiovascular:      Pulses: Normal pulses.      Heart sounds: Normal heart sounds.   Pulmonary:      Effort: Pulmonary effort is normal.      Breath sounds: Normal breath sounds.   Abdominal:      General: Bowel sounds are normal.   Musculoskeletal:         General: Tenderness present.      Cervical back: Normal range of motion.   Skin:     General: Skin is warm and dry.   Neurological:      Mental Status: She is oriented to person, place, and time.   Psychiatric:         Mood and Affect: Mood normal.         WBC   Date/Time Value Ref Range Status   09/23/2023 08:51 AM 9.2 4.4 - 11.3 x10E9/L Final   09/22/2023 08:58 AM 8.0 4.4 - 11.3 x10E9/L Final   09/22/2023 05:24 AM CANCELED       Comment:     Result canceled by the ancillary.     nRBC   Date Value Ref Range Status   09/23/2023 5.5 0.0 - 0.0 /100 WBC Final   09/22/2023 4.0 0.0 - 0.0 /100 WBC Final   09/22/2023 CANCELED       Comment:     Result canceled by the ancillary.     RBC   Date Value Ref Range Status   09/23/2023 3.17 (L) 4.00 - 5.20 x10E12/L Final   09/22/2023 3.20 (L) 4.00 - 5.20 x10E12/L Final   09/22/2023 CANCELED       Comment:     Result canceled by the ancillary.     Hemoglobin   Date Value Ref Range Status   09/23/2023 8.2 (L) 12.0 - 16.0 g/dL Final   09/22/2023 8.7 (L) 12.0 - 16.0 g/dL Final   09/22/2023 CANCELED       Comment:     Result canceled by the ancillary.     Hematocrit   Date Value Ref Range Status   09/23/2023 24.4 (L) 36.0 - 46.0 % Final    09/22/2023 25.3 (L) 36.0 - 46.0 % Final   09/22/2023 CANCELED       Comment:     Result canceled by the ancillary.     MCV   Date/Time Value Ref Range Status   09/23/2023 08:51 AM 77 (L) 80 - 100 fL Final   09/22/2023 08:58 AM 79 (L) 80 - 100 fL Final   09/22/2023 05:24 AM CANCELED       Comment:     Result canceled by the ancillary.     MCH   Date/Time Value Ref Range Status   08/12/2023 03:07 PM 23.7 (L) 26 - 34 PG Final     MCHC   Date/Time Value Ref Range Status   09/23/2023 08:51 AM 33.6 32.0 - 36.0 g/dL Final   09/22/2023 08:58 AM 34.4 32.0 - 36.0 g/dL Final   09/22/2023 05:24 AM CANCELED       Comment:     Result canceled by the ancillary.     RDW   Date/Time Value Ref Range Status   09/23/2023 08:51 AM 26.3 (H) 11.5 - 14.5 % Final   09/22/2023 08:58 AM 26.4 (H) 11.5 - 14.5 % Final   09/22/2023 05:24 AM CANCELED       Comment:     Result canceled by the ancillary.     Platelets   Date/Time Value Ref Range Status   09/23/2023 08:51  150 - 450 x10E9/L Final   09/22/2023 08:58  150 - 450 x10E9/L Final   09/22/2023 05:24 AM CANCELED       Comment:     Result canceled by the ancillary.     MPV   Date/Time Value Ref Range Status   08/12/2023 03:07 PM 9.4 7.0 - 12.6 CU Final     Neutrophils %   Date/Time Value Ref Range Status   09/22/2023 05:24 AM CANCELED       Comment:     Result canceled by the ancillary.   09/21/2023 07:15 AM CANCELED       Comment:     Result canceled by the ancillary.   09/18/2023 10:04 AM 76.1 40.0 - 80.0 % Final     Immature Granulocytes %, Automated   Date/Time Value Ref Range Status   09/23/2023 08:51 AM 0.3 0.0 - 0.9 % Final     Comment:      Immature Granulocyte Count (IG) includes promyelocytes,    myelocytes and metamyelocytes but does not include bands.   Percent differential counts (%) should be interpreted in the   context of the absolute cell counts (cells/L).     09/22/2023 08:58 AM 0.4 0.0 - 0.9 % Final     Comment:      Immature Granulocyte Count (IG) includes  promyelocytes,    myelocytes and metamyelocytes but does not include bands.   Percent differential counts (%) should be interpreted in the   context of the absolute cell counts (cells/L).     09/22/2023 05:24 AM CANCELED       Comment:      Immature Granulocyte Count (IG) includes promyelocytes,    myelocytes and metamyelocytes but does not include bands.   Percent differential counts (%) should be interpreted in the   context of the absolute cell counts (cells/L).    Result canceled by the ancillary.       Lymphocytes %   Date/Time Value Ref Range Status   09/23/2023 08:51 AM 27.4 13.0 - 44.0 % Final   09/22/2023 08:58 AM 20.9 13.0 - 44.0 % Final   09/22/2023 05:24 AM CANCELED       Comment:     Result canceled by the ancillary.   09/21/2023 01:37 PM 20.0 13.0 - 44.0 % Final     Monocytes %   Date/Time Value Ref Range Status   09/23/2023 08:51 AM 8.5 2.0 - 10.0 % Final   09/22/2023 08:58 AM 4.3 2.0 - 10.0 % Final   09/22/2023 05:24 AM CANCELED       Comment:     Result canceled by the ancillary.   09/21/2023 01:37 PM 2.6 2.0 - 10.0 % Final     Eosinophils %   Date/Time Value Ref Range Status   09/23/2023 08:51 AM 3.4 0.0 - 6.0 % Final   09/22/2023 08:58 AM 4.4 0.0 - 6.0 % Final   09/22/2023 05:24 AM CANCELED       Comment:     Result canceled by the ancillary.   09/21/2023 01:37 PM 0.0 0.0 - 6.0 % Final     Basophils %   Date/Time Value Ref Range Status   09/23/2023 08:51 AM 0.0 0.0 - 2.0 % Final   09/22/2023 08:58 AM 0.0 0.0 - 2.0 % Final   09/22/2023 05:24 AM CANCELED       Comment:     Result canceled by the ancillary.   09/21/2023 01:37 PM 0.0 0.0 - 2.0 % Final     Neutrophils Absolute   Date/Time Value Ref Range Status   09/22/2023 05:24 AM CANCELED       Comment:     Result canceled by the ancillary.   09/21/2023 07:15 AM CANCELED       Comment:     Result canceled by the ancillary.   09/18/2023 10:04 AM 7.46 1.20 - 7.70 x10E9/L Final     Immature Granulocytes Absolute, Automated   Date/Time Value Ref Range  "Status   08/12/2023 03:07 PM 0.12 (H) 0.0 - 0.1 K/UL Final     Lymphocytes Absolute   Date/Time Value Ref Range Status   09/22/2023 05:24 AM CANCELED       Comment:     Result canceled by the ancillary.   09/21/2023 07:15 AM CANCELED       Comment:     Result canceled by the ancillary.   09/18/2023 10:04 AM 1.41 1.20 - 4.80 x10E9/L Final     Monocytes Absolute   Date/Time Value Ref Range Status   09/22/2023 05:24 AM CANCELED       Comment:     Result canceled by the ancillary.   09/21/2023 07:15 AM CANCELED       Comment:     Result canceled by the ancillary.   09/18/2023 10:04 AM 0.74 0.10 - 1.00 x10E9/L Final     Eosinophils Absolute   Date/Time Value Ref Range Status   09/23/2023 08:51 AM 0.31 0.00 - 0.70 x10E9/L Final   09/22/2023 08:58 AM 0.35 0.00 - 0.70 x10E9/L Final   09/22/2023 05:24 AM CANCELED       Comment:     Result canceled by the ancillary.   09/21/2023 01:37 PM 0.00 0.00 - 0.70 x10E9/L Final     Basophils Absolute   Date/Time Value Ref Range Status   09/23/2023 08:51 AM 0.00 0.00 - 0.10 x10E9/L Final   09/22/2023 08:58 AM 0.00 0.00 - 0.10 x10E9/L Final   09/22/2023 05:24 AM CANCELED       Comment:     Result canceled by the ancillary.   09/21/2023 01:37 PM 0.00 0.00 - 0.10 x10E9/L Final       No components found for: \"PT\"  aPTT   Date/Time Value Ref Range Status   09/23/2023 08:51 AM 30 27 - 38 sec Final     Comment:     Note new reference range as of 6/20/2023 at 10:00am.   09/22/2023 08:58 AM 29 27 - 38 sec Final     Comment:     Note new reference range as of 6/20/2023 at 10:00am.   09/21/2023 07:15 AM CANCELED       Comment:     Note new reference range as of 6/20/2023 at 10:00am.    Result canceled by the ancillary.         Assessment/Plan        Restart duloxetine at 20mg for one month, then will evaluate and increase to 30mg  Continue 10-20mg oxycodone as needed for pain (declined option to trial hydromorphone)   ISABELLE w reflex pending- if positive will refer to rheumatology (saw inpt)   RBCX " on 10/27- pt to be admitted on 10/26  Referral to gyn for visit and management of hot flashes  Pt will schedule optho visit on her own  Next fuv prior to RBCX  Will consider pain management referral- will need cardiac clearance prior to treatment after rheumatology assessment      Senait Narvaez is a 53 year old with   1. History of Hb SC SCD and chronic pain   - oral tylenol as needed for mild to moderate pain   - oral oxycodone 10 mg every 4-6 hours as needed for moderate to severe and breakthrough pain   - oral duloxetine for chronic pain and aslo anxiety/depression   - reviewed [place]RS  - discussed non pharmacologic methods of pain control     2. Encounter for DMT with full rbc exchange every 5 weeks and next scheduled on 9/20/23, indication being MSOF and chronic pain. She has very poor venous access and using femoral veins for apheresis   - proceed with planned full exchange as scheduled. This will be performed under telemetry on account of recurrent arrythmia     3. History of recurrent VTE/PE with SVC syndrome. She is on life long anticoagulation with warfarin   - continue oral warfarin 5 mg daily with target INR of 2-3     4.  History of bronchial asthma and seasonal allergies   - as needed albuterol   - continue inhaled nasal steroids   - continue as needed loratidine   - observe cough and if worsens, will give us a call     5. History of insominia   - continue oral trazodone     6. History of CAN on CPAP with 2L of supplemental oxygen    7. Anemia with HB of 11.2 g/dl   - continue daily folic acid     8. Chronic constipation   - colase and miralax as needed     9. History of tachycardia and arrythmia/SVT, well controlled   - metoprolol as prescribed   - follow up with cardiology as scheduled     10. CKD with  eGFR of 75  - follow up with nephrology as scheduled   - avoid nephrotoxic medications     11. Pulmonary hypertension   - follow up with pulmonology as scheduled          Problem List Items Addressed This  Visit    None  Visit Diagnoses       Sickle cell disease with crisis (CMS/HCC)    -  Primary    Relevant Orders    Calcium, Ionized    CBC and Auto Differential    Comprehensive Metabolic Panel    Reticulocytes    Lactate Dehydrogenase    HEMOGLOBIN IDENTIFICATION WITH PATH REVIEW    Ferritin    Blood typing and cross match

## 2023-12-06 ENCOUNTER — LAB (OUTPATIENT)
Dept: LAB | Facility: HOSPITAL | Age: 53
DRG: 812 | End: 2023-12-06
Payer: COMMERCIAL

## 2023-12-06 ENCOUNTER — OFFICE VISIT (OUTPATIENT)
Dept: HEMATOLOGY/ONCOLOGY | Facility: HOSPITAL | Age: 53
DRG: 812 | End: 2023-12-06
Payer: COMMERCIAL

## 2023-12-06 ENCOUNTER — DOCUMENTATION (OUTPATIENT)
Dept: HEMATOLOGY/ONCOLOGY | Facility: HOSPITAL | Age: 53
End: 2023-12-06

## 2023-12-06 VITALS
RESPIRATION RATE: 18 BRPM | WEIGHT: 236.11 LBS | OXYGEN SATURATION: 94 % | SYSTOLIC BLOOD PRESSURE: 101 MMHG | TEMPERATURE: 97.9 F | BODY MASS INDEX: 39.34 KG/M2 | DIASTOLIC BLOOD PRESSURE: 63 MMHG | HEART RATE: 76 BPM

## 2023-12-06 VITALS
OXYGEN SATURATION: 95 % | RESPIRATION RATE: 18 BRPM | DIASTOLIC BLOOD PRESSURE: 59 MMHG | BODY MASS INDEX: 39.25 KG/M2 | SYSTOLIC BLOOD PRESSURE: 109 MMHG | WEIGHT: 235.6 LBS | HEART RATE: 82 BPM | TEMPERATURE: 97.5 F

## 2023-12-06 DIAGNOSIS — D57.00 SICKLE CELL DISEASE WITH CRISIS (MULTI): ICD-10-CM

## 2023-12-06 DIAGNOSIS — Z82.61 FAMILY HISTORY OF RHEUMATOID ARTHRITIS: Primary | ICD-10-CM

## 2023-12-06 DIAGNOSIS — D57.00: ICD-10-CM

## 2023-12-06 DIAGNOSIS — D57.00 SICKLE CELL CRISIS (MULTI): ICD-10-CM

## 2023-12-06 DIAGNOSIS — R52 ACUTE PAIN: Primary | ICD-10-CM

## 2023-12-06 LAB
ABO GROUP (TYPE) IN BLOOD: NORMAL
ACANTHOCYTES BLD QL SMEAR: NORMAL
ALBUMIN SERPL BCP-MCNC: 4 G/DL (ref 3.4–5)
ALP SERPL-CCNC: 96 U/L (ref 33–110)
ALT SERPL W P-5'-P-CCNC: 6 U/L (ref 7–45)
ANION GAP SERPL CALC-SCNC: 9 MMOL/L (ref 10–20)
ANTIBODY SCREEN: NORMAL
AST SERPL W P-5'-P-CCNC: 10 U/L (ref 9–39)
BASOPHILS # BLD AUTO: 0.02 X10*3/UL (ref 0–0.1)
BASOPHILS NFR BLD AUTO: 0.2 %
BILIRUB SERPL-MCNC: 1.1 MG/DL (ref 0–1.2)
BUN SERPL-MCNC: 11 MG/DL (ref 6–23)
BURR CELLS BLD QL SMEAR: NORMAL
CA-I BLD-SCNC: 1.24 MMOL/L (ref 1.1–1.33)
CALCIUM SERPL-MCNC: 9.3 MG/DL (ref 8.6–10.3)
CHLORIDE SERPL-SCNC: 104 MMOL/L (ref 98–107)
CO2 SERPL-SCNC: 31 MMOL/L (ref 21–32)
CREAT SERPL-MCNC: 1.37 MG/DL (ref 0.5–1.05)
DACRYOCYTES BLD QL SMEAR: NORMAL
EOSINOPHIL # BLD AUTO: 0.11 X10*3/UL (ref 0–0.7)
EOSINOPHIL NFR BLD AUTO: 1 %
ERYTHROCYTE [DISTWIDTH] IN BLOOD BY AUTOMATED COUNT: 26.8 % (ref 11.5–14.5)
FERRITIN SERPL-MCNC: 14 NG/ML (ref 8–150)
GFR SERPL CREATININE-BSD FRML MDRD: 46 ML/MIN/1.73M*2
GLUCOSE SERPL-MCNC: 94 MG/DL (ref 74–99)
HCT VFR BLD AUTO: 33.4 % (ref 36–46)
HEMOGLOBIN A2: 3.3 % (ref 2–3.5)
HEMOGLOBIN A: 40.3 % (ref 95.8–98)
HEMOGLOBIN C: 27.2 %
HEMOGLOBIN F: 0.9 % (ref 0–2)
HEMOGLOBIN IDENTIFICATION INTERPRETATION: ABNORMAL
HEMOGLOBIN S: 28.3 %
HGB BLD-MCNC: 10.8 G/DL (ref 12–16)
HGB RETIC QN: 22 PG (ref 28–38)
HYPOCHROMIA BLD QL SMEAR: NORMAL
IMM GRANULOCYTES # BLD AUTO: 0.03 X10*3/UL (ref 0–0.7)
IMM GRANULOCYTES NFR BLD AUTO: 0.3 % (ref 0–0.9)
IMMATURE RETIC FRACTION: 23.2 %
LDH SERPL L TO P-CCNC: 144 U/L (ref 84–246)
LYMPHOCYTES # BLD AUTO: 1.5 X10*3/UL (ref 1.2–4.8)
LYMPHOCYTES NFR BLD AUTO: 13.1 %
MCH RBC QN AUTO: 22.4 PG (ref 26–34)
MCHC RBC AUTO-ENTMCNC: 32.3 G/DL (ref 32–36)
MCV RBC AUTO: 69 FL (ref 80–100)
MONOCYTES # BLD AUTO: 0.84 X10*3/UL (ref 0.1–1)
MONOCYTES NFR BLD AUTO: 7.3 %
NEUTROPHILS # BLD AUTO: 8.97 X10*3/UL (ref 1.2–7.7)
NEUTROPHILS NFR BLD AUTO: 78.1 %
NRBC BLD-RTO: 3.9 /100 WBCS (ref 0–0)
PAPPENHEIMER BOD BLD QL SMEAR: PRESENT
PATH REVIEW-HGB IDENTIFICATION: ABNORMAL
PLATELET # BLD AUTO: 275 X10*3/UL (ref 150–450)
POLYCHROMASIA BLD QL SMEAR: NORMAL
POTASSIUM SERPL-SCNC: 4.7 MMOL/L (ref 3.5–5.3)
PROT SERPL-MCNC: 7.2 G/DL (ref 6.4–8.2)
RBC # BLD AUTO: 4.82 X10*6/UL (ref 4–5.2)
RBC MORPH BLD: NORMAL
RETICS #: 0.11 X10*6/UL (ref 0.02–0.08)
RETICS/RBC NFR AUTO: 2.3 % (ref 0.5–2)
RH FACTOR (ANTIGEN D): NORMAL
SCHISTOCYTES BLD QL SMEAR: NORMAL
SICKLE CELLS BLD QL SMEAR: NORMAL
SODIUM SERPL-SCNC: 139 MMOL/L (ref 136–145)
TARGETS BLD QL SMEAR: NORMAL
WBC # BLD AUTO: 11.5 X10*3/UL (ref 4.4–11.3)

## 2023-12-06 PROCEDURE — 80053 COMPREHEN METABOLIC PANEL: CPT

## 2023-12-06 PROCEDURE — 85025 COMPLETE CBC W/AUTO DIFF WBC: CPT

## 2023-12-06 PROCEDURE — 82330 ASSAY OF CALCIUM: CPT

## 2023-12-06 PROCEDURE — 36415 COLL VENOUS BLD VENIPUNCTURE: CPT

## 2023-12-06 PROCEDURE — 2500000005 HC RX 250 GENERAL PHARMACY W/O HCPCS: Performed by: NURSE PRACTITIONER

## 2023-12-06 PROCEDURE — 99417 PROLNG OP E/M EACH 15 MIN: CPT | Mod: ZK | Performed by: NURSE PRACTITIONER

## 2023-12-06 PROCEDURE — 83615 LACTATE (LD) (LDH) ENZYME: CPT

## 2023-12-06 PROCEDURE — 82728 ASSAY OF FERRITIN: CPT

## 2023-12-06 PROCEDURE — 2500000001 HC RX 250 WO HCPCS SELF ADMINISTERED DRUGS (ALT 637 FOR MEDICARE OP): Performed by: NURSE PRACTITIONER

## 2023-12-06 PROCEDURE — 2500000004 HC RX 250 GENERAL PHARMACY W/ HCPCS (ALT 636 FOR OP/ED): Performed by: NURSE PRACTITIONER

## 2023-12-06 PROCEDURE — 96372 THER/PROPH/DIAG INJ SC/IM: CPT | Mod: ZK | Performed by: NURSE PRACTITIONER

## 2023-12-06 PROCEDURE — 86850 RBC ANTIBODY SCREEN: CPT

## 2023-12-06 PROCEDURE — 1036F TOBACCO NON-USER: CPT | Performed by: INTERNAL MEDICINE

## 2023-12-06 PROCEDURE — 85045 AUTOMATED RETICULOCYTE COUNT: CPT

## 2023-12-06 PROCEDURE — 3008F BODY MASS INDEX DOCD: CPT | Performed by: INTERNAL MEDICINE

## 2023-12-06 PROCEDURE — 83021 HEMOGLOBIN CHROMOTOGRAPHY: CPT

## 2023-12-06 PROCEDURE — G2212 PROLONG OUTPT/OFFICE VIS: HCPCS | Performed by: NURSE PRACTITIONER

## 2023-12-06 PROCEDURE — 86922 COMPATIBILITY TEST ANTIGLOB: CPT

## 2023-12-06 PROCEDURE — 86901 BLOOD TYPING SEROLOGIC RH(D): CPT

## 2023-12-06 PROCEDURE — 99215 OFFICE O/P EST HI 40 MIN: CPT | Performed by: NURSE PRACTITIONER

## 2023-12-06 PROCEDURE — 99215 OFFICE O/P EST HI 40 MIN: CPT | Mod: 25,ZK | Performed by: NURSE PRACTITIONER

## 2023-12-06 PROCEDURE — 83020 HEMOGLOBIN ELECTROPHORESIS: CPT | Performed by: PATHOLOGY

## 2023-12-06 PROCEDURE — 96372 THER/PROPH/DIAG INJ SC/IM: CPT

## 2023-12-06 RX ORDER — DULOXETIN HYDROCHLORIDE 20 MG/1
CAPSULE, DELAYED RELEASE ORAL
Qty: 60 CAPSULE | Refills: 2 | Status: SHIPPED | OUTPATIENT
Start: 2023-12-06 | End: 2024-01-24 | Stop reason: HOSPADM

## 2023-12-06 RX ORDER — NALOXONE HYDROCHLORIDE 0.4 MG/ML
0.4 INJECTION, SOLUTION INTRAMUSCULAR; INTRAVENOUS; SUBCUTANEOUS
Status: DISCONTINUED | OUTPATIENT
Start: 2023-12-06 | End: 2023-12-06 | Stop reason: HOSPADM

## 2023-12-06 RX ORDER — ONDANSETRON HYDROCHLORIDE 8 MG/1
8 TABLET, FILM COATED ORAL ONCE
Status: COMPLETED | OUTPATIENT
Start: 2023-12-06 | End: 2023-12-06

## 2023-12-06 RX ORDER — DIPHENHYDRAMINE HCL 25 MG
25 CAPSULE ORAL ONCE
Status: COMPLETED | OUTPATIENT
Start: 2023-12-06 | End: 2023-12-06

## 2023-12-06 RX ORDER — OXYCODONE HYDROCHLORIDE 10 MG/1
10 TABLET ORAL EVERY 4 HOURS PRN
Qty: 75 TABLET | Refills: 0 | Status: SHIPPED | OUTPATIENT
Start: 2023-12-06 | End: 2023-12-20 | Stop reason: SDUPTHER

## 2023-12-06 RX ADMIN — HYDROMORPHONE HYDROCHLORIDE 0.5 MG: 2 INJECTION, SOLUTION INTRAMUSCULAR; INTRAVENOUS; SUBCUTANEOUS at 13:17

## 2023-12-06 RX ADMIN — HYDROMORPHONE HYDROCHLORIDE 0.5 MG: 2 INJECTION, SOLUTION INTRAMUSCULAR; INTRAVENOUS; SUBCUTANEOUS at 11:08

## 2023-12-06 RX ADMIN — ONDANSETRON HYDROCHLORIDE 8 MG: 8 TABLET, FILM COATED ORAL at 12:20

## 2023-12-06 RX ADMIN — DIPHENHYDRAMINE HYDROCHLORIDE 25 MG: 25 CAPSULE ORAL at 12:20

## 2023-12-06 RX ADMIN — HYDROMORPHONE HYDROCHLORIDE 0.5 MG: 2 INJECTION, SOLUTION INTRAMUSCULAR; INTRAVENOUS; SUBCUTANEOUS at 12:20

## 2023-12-06 ASSESSMENT — PAIN SCALES - GENERAL
PAINLEVEL_OUTOF10: 9
PAINLEVEL_OUTOF10: 7
PAINLEVEL: 10-WORST PAIN EVER
PAINLEVEL_OUTOF10: 8
PAINLEVEL: 9

## 2023-12-06 ASSESSMENT — PAIN - FUNCTIONAL ASSESSMENT
PAIN_FUNCTIONAL_ASSESSMENT: 0-10

## 2023-12-06 NOTE — PROGRESS NOTES
Patient ID:  Senait Narvaez is a 53 y.o. female.    Past Medical History:   Past Medical History:  No date: Asthma  No date: CHF (congestive heart failure) (CMS/HCC)  No date: Chronic pain disorder  02/19/2020: Compression of vein      Comment:  Superior vena cava syndrome  12/10/2020: Hypotension, unspecified   Surgical History:    Past Surgical History:   Procedure Laterality Date   • APPENDECTOMY  08/05/2013    Appendectomy   • CHOLECYSTECTOMY  08/05/2013    Cholecystectomy   • CT ANGIO NECK W  10/19/2022    CT NECK ANGIO W AND WO IV CONTRAST 10/19/2022 [facility] CLINICAL LEGAC[facility]   • CT GUIDED PERCUTANEOUS BIOPSY LYMPH NODE SUPERFICIAL  9/19/2019    CT GUIDED PERCUTANEOUS BIOPSY LYMPH NODE SUPERFICIAL 9[place]19/[place] [facility] [place]   • IR CVC NONTUNNELED  10/26/2023    IR CVC NONTUNNELED 10[place]26/2023 [facility] [place]   • IR CVC TUNNELED  3/13/2015    IR CVC TUNNELED 3/13/2015 [facility] CLINICAL LEGACY   • IR CVC TUNNELED  1/29/2016    IR CVC TUNNELED 1/29/2016 [facility]   • IR CVC TUNNELED  1/17/2018    IR CVC TUNNELED 1/17/2018 [facility]   • IR CVC TUNNELED  2/22/2018    IR CVC TUNNELED 2/22/2018 [facility]   • IR CVC TUNNELED  3/22/2018    IR CVC TUNNELED 3/22/2018 [facility] CLINICAL LEGACY   • IR CVC TUNNELED  4/18/2018    IR CVC TUNNELED 4/18/2018 [facility]   • IR CVC TUNNELED  5/16/2018    IR CVC TUNNELED 5/16/2018 [facility]   • IR CVC TUNNELED  6/12/2018    IR CVC TUNNELED 6/12/2018 [facility]   • IR CVC TUNNELED  1/21/2020    IR CVC TUNNELED 1/21/2020 [facility]PATIENT LEGACY   • IR CVC TUNNELED  2/28/2020    IR CVC TUNNELED 2/28/2020 [facility]   • IR CVC TUNNELED  4/10/2020    IR CVC TUNNELED 4/10/2020 [facility]   • IR CVC TUNNELED  5/22/2020    IR CVC TUNNELED 5/22/2020 [facility]   • IR CVC TUNNELED  6/19/2020    IR CVC TUNNELED 6/19/2020 [facility]   • IR CVC TUNNELED  7/23/2020    IR CVC TUNNELED 7/23/2020 [facility]   • IR CVC TUNNELED  9/4/2020    IR CVC TUNNELED  9/4/2020 [facility]   • IR CVC TUNNELED  10/9/2020    IR CVC TUNNELED 10/9/2020 [facility]   • IR CVC TUNNELED  11/13/2020    IR CVC TUNNELED 11/13/2020 [facility]   • IR CVC TUNNELED  12/18/2020    IR CVC TUNNELED 12/18/2020 [facility]   • IR CVC TUNNELED  1/22/2021    IR CVC TUNNELED 1/22/2021 [facility]   • IR CVC TUNNELED  2/26/2021    IR CVC TUNNELED 2/26/2021 [facility] CLINICAL LEGACY   • IR CVC TUNNELED  4/2/2021    IR CVC TUNNELED 4/2/2021 [facility]   • IR CVC TUNNELED  5/7/2021    IR CVC TUNNELED 5/7/2021 [facility]   • IR CVC TUNNELED  6/11/2021    IR CVC TUNNELED 6/11/2021 [facility]   • IR CVC TUNNELED  7/16/2021    IR CVC TUNNELED 7/16/2021 [facility]   • IR CVC TUNNELED  8/20/2021    IR CVC TUNNELED 8/20/2021 [facility]   • IR CVC TUNNELED  9/24/2021    IR CVC TUNNELED 9/24/2021 [facility]   • IR CVC TUNNELED  10/29/2021    IR CVC TUNNELED 10/29/2021 [facility]   • IR CVC TUNNELED  12/3/2021    IR CVC TUNNELED 12/3/2021 [facility]   • IR CVC TUNNELED  1/7/2022    IR CVC TUNNELED 1/7/2022 [facility]   • IR CVC TUNNELED  2/23/2022    IR CVC TUNNELED 2/23/2022 [facility] CLINICAL LEGACY   • IR CVC TUNNELED  3/25/2022    IR CVC TUNNELED 3/25/2022 [facility]   • IR CVC TUNNELED  4/22/2022    IR CVC TUNNELED 4/22/2022 [facility]   • IR CVC TUNNELED  6/3/2022    IR CVC TUNNELED 6/3/2022 [facility]   • IR CVC TUNNELED  9/18/2017    IR CVC TUNNELED 9/18/2017 [facility]   • IR CVC TUNNELED  10/30/2017    IR CVC TUNNELED 10/30/2017 [facility]   • IR CVC TUNNELED  12/19/2017    IR CVC TUNNELED 12/19/2017 [facility]   • IR CVC TUNNELED  1/6/2023    IR CVC TUNNELED 1/6/2023 [facility]   • IR CVC TUNNELED  2/24/2023    IR CVC TUNNELED [facility]   • IR CVC TUNNELED  7/8/2022    IR CVC TUNNELED 7/8/2022 [facility]   • IR CVC TUNNELED  8/12/2022    IR CVC TUNNELED 8/12/2022 [facility] CLINICAL  LEGACY   • IR CVC TUNNELED  9/16/2022    IR CVC TUNNELED 9/16/2022 [facility] ANCILLARY LEGACY   • IR CVC TUNNELED  10/20/2022    IR CVC TUNNELED 10/20/2022 [facility] CL[facility] LEGAC[facility]   • IR CVC TUNNELED  11/29/2022    IR CVC TUNNELED 11/29/2022 [facility] ANCILLARY LEGACY   • IR CVC TUNNELED  3/31/2023    IR CVC TUNNELED [facility]   • IR CVC TUNNELED  6/9/2023    IR CVC TUNNELED 6/9/2023 [facility]   • IR CVC TUNNELED  7/20/2023    IR CVC TUNNELED 7/20/2023 [facility]   • IR CVC TUNNELED  8/16/2023    IR CVC TUNNELED 8/16/2023 [facility]   • IR CVC TUNNELED  9/21/2023    IR CVC TUNNELED 9/21/2023 [facility]   • MR HEAD ANGIO WO IV CONTRAST  8/16/2014    MR HEAD ANGIO WO IV CONTRAST 8/16/2014 [facility] ANCILLARY LEGACY   • MR NECK ANGIO WO IV CONTRAST  8/16/2014    MR NECK ANGIO WO IV CONTRAST 8/16/2014 [facility]CILLARY LEGACY   • OTHER SURGICAL HISTORY  05/13/2014    Fluid Drained, Retina Inspected: Breaks Supported By Buckle   • OTHER SURGICAL HISTORY  10/09/2014    Closed Treatment Of Fracture Of The Lateral Malleolus   • OTHER SURGICAL HISTORY  06/09/2014    Salpingo-oophorectomy Right Side   • US GUIDED BIOPSY LYMPH NODE SUPERFICIAL  9/17/2019    US GUIDED BIOPSY LYMPH NODE SUPERFICIAL 9/17/2019 [facility] INPATIENT LEGACY      Family History:    Family History   Problem Relation Name Age of Onset   • Diabetes Father     • Gout Father     • Sickle cell trait Father     • Seizures Sister     • Diabetes Other     • Uterine cancer Other     • Other (congenital blindness) Cousin       Family Oncology History:    Cancer-related family history includes Uterine cancer in an other family member.  Social History:    Social History     Tobacco Use   • Smoking status: Former     Types: Cigarettes     Passive exposure: Current   • Smokeless tobacco: Never   Substance Use Topics   • Alcohol use: Not Currently   • Drug use: Not Currently          Subjective   Chief Complaint: Uncontrolled Pain    HPI  Senait is a 53 y.o. 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 She rates her pain as 9/10 and describes it as generalized pain throughout her body that started about 3-4 days ago. The last time she took pain medication was on Sunday 10 mg oxycodone, this was the last of her prescription. She did request a refill from her sickle cell team at her appointment this am. A tolerable pain level for her is 5/10. Pt denies chest pain, cough, SOB,  blurry vision, falls, fever or chills, n/v/d/abd pain, or urinary complaints.  She did confirm having a headache last night but it has since resolved. Her last BM was this am. She has a scheduled admission tomorrow for a planned exchange on Friday 12/8.       ROS  See above    Allergies  No Known Allergies     Medications  Current Outpatient Medications   Medication Instructions   • albuterol 90 mcg/actuation inhaler 2 puffs, inhalation, Every 4 hours PRN   • docusate sodium (COLACE) 100 mg, oral, 2 times daily PRN   • DULoxetine (CYMBALTA) 20 mg, oral, Daily   • folic acid (FOLVITE) 1 mg, oral, Daily   • furosemide (LASIX) 20 mg, oral, Every other day   • loratadine (Claritin) 10 mg tablet 1 tablet, oral, Daily PRN   • metoprolol tartrate (LOPRESSOR) 25 mg, oral, 2 times daily   • mometasone (Nasonex) 50 mcg/actuation nasal spray 2 sprays, Each Nostril, Daily   • multivitamin tablet 1 tablet, oral, Daily   • ondansetron (ZOFRAN) 4 mg, oral, Every 8 hours PRN   • oxyCODONE (ROXICODONE) 10 mg, oral, Every 4 hours PRN   • oxygen (O2) gas therapy 1 each, inhalation, Continuous   • traZODone (DESYREL) 50 mg, oral, Nightly PRN, 1/2 - 1 tabs for insomnia   • warfarin (COUMADIN) 5 mg, oral, Every evening          Objective   Vitals: /63 (BP Location:  Right arm, Patient Position: Sitting, BP Cuff Size: Large adult)   Pulse 76   Temp 36.6 °C (97.9 °F) (Temporal)   Resp 18   Wt 107 kg (236 lb 1.8 oz)   SpO2 94%   BMI 39.34 kg/m²   Weight:   Vitals:    12/06/23 1054   Weight: 107 kg (236 lb 1.8 oz)       Physical Exam  Constitutional:       Appearance: Normal appearance.   HENT:      Head: Normocephalic and atraumatic.      Mouth/Throat:      Mouth: Mucous membranes are moist.   Eyes:      Conjunctiva/sclera: Conjunctivae normal.   Cardiovascular:      Rate and Rhythm: Normal rate.      Pulses: Normal pulses.      Heart sounds: Normal heart sounds.   Pulmonary:      Effort: Pulmonary effort is normal.      Breath sounds: Normal breath sounds.   Abdominal:      General: Abdomen is flat. Bowel sounds are normal.      Palpations: Abdomen is soft.   Musculoskeletal:         General: Normal range of motion.      Cervical back: Normal range of motion.   Skin:     General: Skin is warm and dry.   Neurological:      General: No focal deficit present.      Mental Status: She is alert and oriented to person, place, and time.   Psychiatric:         Mood and Affect: Mood normal.         Behavior: Behavior normal.         Thought Content: Thought content normal.       Diagnostic Results     Labs    Results from last 7 days   Lab Units 12/06/23  1034   WBC AUTO x10*3/uL 11.5*   HEMOGLOBIN g/dL 10.8*   HEMATOCRIT % 33.4*   PLATELETS AUTO x10*3/uL 275   NEUTROS ABS x10*3/uL 8.97*   LYMPHS ABS AUTO x10*3/uL 1.50   MONOS ABS AUTO x10*3/uL 0.84   EOS ABS AUTO x10*3/uL 0.11   NEUTROS PCT AUTO % 78.1   LYMPHS PCT AUTO % 13.1   MONOS PCT AUTO % 7.3   EOS PCT AUTO % 1.0      Results from last 7 days   Lab Units 12/06/23  1034   GLUCOSE mg/dL 94   SODIUM mmol/L 139   POTASSIUM mmol/L 4.7   CHLORIDE mmol/L 104   CO2 mmol/L 31   BUN mg/dL 11   CREATININE mg/dL 1.37*   EGFR mL/min/1.73m*2 46*   CALCIUM mg/dL 9.3   ALBUMIN g/dL 4.0   PROTEIN TOTAL g/dL 7.2     Results from last 7 days    Lab Units 12/06/23  1034   BILIRUBIN TOTAL mg/dL 1.1   ALK PHOS U/L 96   ALT U/L 6*   AST U/L 10         Results from last 7 days   Lab Units 12/06/23  1034   RETIC CT PCT % 2.3*   RETIC CT ABS x10*6/uL 0.111*   IMMATURE RETIC FRACTION % 23.2*   RETIC HGB pg 22*             Lab Results   Component Value Date    HGB 10.8 (L) 12/06/2023    HGB 9.3 (L) 11/01/2023    HGB 9.1 (L) 10/31/2023     12/06/2023     11/01/2023     10/31/2023     12/06/2023     11/01/2023     10/31/2023       Images  === 10/26/23 ===    XR HAND 3+ VIEWS LEFT    - Impression -  Unremarkable bilateral hand radiographs.    Signed by: Tia Tripp 10/28/2023 4:14 PM  Dictation workstation:   [id number]   === 10/24/22 ===    CT CHEST PULMONARY EMBOLISM W IV CONTRAST    - Impression -  1. No evidence of acute pulmonary embolism. Prominent azygous/joi  azygous system may reflect azygous/SVC junction stricture/narrowing.  2. Nonocclusive eccentric filling defects in the bilateral lower lobe  subsegmental pulmonary branches, likely chronic pulmonary emboli.  Mosaic perfusion pattern most likely represents changes of prior  chronic pulmonary embolism. Correlate with a component of small  vessel disease. Correlate with pulmonary hypertension.  3. Stable multifocal bandlike atelectasis versus scarring.  4. Stable 3-4 mm pulmonary nodules, likely benign. No new or  suspicious pulmonary nodules are seen.    I personally reviewed the images/study and I agree with the findings  as stated. This study was interpreted at [facility], [place], [place].     No echocardiogram results found for the past 12 months       Assessment/Plan   Senait Narvaez is a 53 y.o. female with hemoglobin SC SCD (on exchange transfusions every five to six weeks) with uncontrolled sickle cell pain.          [facility] Course  - VSS  -  Hemolysis labs near baseline, no indication of acute vaso-occlusive crisis or  indication for blood transfusion   - Dilaudid 0.5mg subcutaneous every hour  x 3 doses  given for sickle cell related pain  - Zofran 8mg once for opioid inuced nausea/vomiting  - Benadryl 25mg once for opioid induced pruritus     Disposition  - Patient discharged home with no further needs following [facility] Course  - Return to clinic/ED instructions given  - Planned admission for exchange transfusion on 12/7            Marlene Harmon, GRISEL-CNP

## 2023-12-06 NOTE — PROGRESS NOTES
Patient ID: Senait Narvaez is a 53 y.o. female.  Referring Physician: No referring provider defined for this encounter.  Primary Care Provider: Eric Wei MD  Visit Type: Follow Up      Subjective    HPI  Senait is not feeling well today, she is in pain all over. Worst in legs, rates 9/10. Had some swelling yesterday. No recent illness. No chest pain or sob. Wants to go to [facility]. Agree to increase duloxitine to 40mg daily. 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 Has been evaluated for BMT but donor was considered to be too high risk.   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   History of acute chest syndrome.   Question of fibromyalgia.   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   Obstructive Sleep and significant nocturnal hemoglobin desaturation (greater than 40% of sleep time with an SaO2 of less than 90%).   Bilateral lower extremity edema, recurrent  SVC syndrome: She was diagnosed with SVC syndrome, she had Bilateral Upper Extremity and Facial Swelling d/t partial filling defect within the SVC and a thrombus of the SVC complicated by bilateral Mediport catheters. Vasc med consulted, rec lifelong coumadin. She was on heparin drip bridge and coumadin was initiated on 1/16. INR 3.0 on 1/29/2019. She is status post Venogram, SVC balloon angioplasty and Right mediport removal (1/15/20) and status post placement of left IJ temporary apharesis catheter, SVC angiogram, Balloon angioplasty of SVC/left brachiocephalic vein, Removal of left sided Mediport catheter (1/21/20). She was given IV diuretics lasix 2/26-29 for UE edema 2/2 SVC syndrome with edema.  Breast remain swollen recommended breast binder. SVC in Nov 2022 with stent placed.   Syncopal episodes - referred to Neuro   11/22 hospitalization for SVC stricture; s/p SVC angioplasty, which was successful.  FUV in IR 12/9/22.   Bilateral Upper Extremity and Facial Swelling d/t partial filling defect within the SVC and a thrombus of the SVC complicated by bilateral Mediport catheters.   Balloon removed via IR on April 2023.   SVT event with conversion with 1 dose 6mg of adenosine on 5/5/23  Admission June 2023 underwent RHC/LHC on 6/16 with mPA pressure 36, wedge 14, CI 4.2 and her coronary angiogram revealed no angiographic evidence of obstructive CAD. Dx of pulm HTN.   16. Positive ISABELLE with reflex- upcoming rheumatology appt     Review of Systems   Constitutional:  Positive for appetite change and fatigue.   Eyes: Negative.    Cardiovascular: Negative.    Gastrointestinal:  Positive for nausea.   Endocrine: Positive for hot flashes.   Genitourinary: Negative.     Neurological:  Positive for dizziness.   Hematological: Negative.    Psychiatric/Behavioral: Negative.          Objective   BSA: There is no height or weight on file to calculate BSA.  There were no vitals taken for this visit.     has a past medical history of Asthma, CHF (congestive heart failure) (CMS/HCC), Chronic pain disorder, Compression of vein (02/19/2020), and Hypotension, unspecified (12/10/2020).   has a past surgical history that includes Appendectomy (08/05/2013); Cholecystectomy (08/05/2013); Other surgical history (05/13/2014); Other surgical history (10/09/2014); Other surgical history (06/09/2014); MR angio head wo IV contrast (8/16/2014); MR angio neck wo IV contrast (8/16/2014); IR CVC tunneled (3/13/2015); IR CVC tunneled (1/29/2016); IR CVC tunneled (1/17/2018); IR CVC tunneled (2/22/2018); IR CVC tunneled (3/22/2018); IR CVC tunneled (4/18/2018); IR CVC tunneled (5/16/2018); IR CVC tunneled (6/12/2018); US guided biopsy lymph  node superficial (9/17/2019); CT guided percutaneous biopsy LYMPH node superficial (9/19/2019); IR CVC tunneled (1/21/2020); IR CVC tunneled (2/28/2020); IR CVC tunneled (4/10/2020); IR CVC tunneled (5/22/2020); IR CVC tunneled (6/19/2020); IR CVC tunneled (7/23/2020); IR CVC tunneled (9/4/2020); IR CVC tunneled (10/9/2020); IR CVC tunneled (11/13/2020); IR CVC tunneled (12/18/2020); IR CVC tunneled (1/22/2021); IR CVC tunneled (2/26/2021); IR CVC tunneled (4/2/2021); IR CVC tunneled (5/7/2021); IR CVC tunneled (6/11/2021); IR CVC tunneled (7/16/2021); IR CVC tunneled (8/20/2021); IR CVC tunneled (9/24/2021); IR CVC tunneled (10/29/2021); IR CVC tunneled (12/3/2021); IR CVC tunneled (1/7/2022); IR CVC tunneled (2/23/2022); IR CVC tunneled (3/25/2022); IR CVC tunneled (4/22/2022); IR CVC tunneled (6/3/2022); IR CVC tunneled (9/18/2017); IR CVC tunneled (10/30/2017); IR CVC tunneled (12/19/2017); IR CVC tunneled (1/6/2023); IR CVC tunneled (2/24/2023); IR CVC tunneled (7/8/2022); IR CVC tunneled (8/12/2022); IR CVC tunneled (9/16/2022); CT angio neck w and wo IV contrast (10/19/2022); IR CVC tunneled (10/20/2022); IR CVC tunneled (11/29/2022); IR CVC tunneled (3/31/2023); IR CVC tunneled (6/9/2023); IR CVC tunneled (7/20/2023); IR CVC tunneled (8/16/2023); and IR CVC tunneled (9/21/2023).  Family History   Problem Relation Name Age of Onset    Diabetes Father      Gout Father      Sickle cell trait Father      Seizures Sister      Diabetes Other      Uterine cancer Other      Other (congenital blindness) Cousin     Grandfather had RA   Oncology History    No history exists.       Senait Narvaez  reports that she has quit smoking. Her smoking use included cigarettes. She has been exposed to tobacco smoke. She has never used smokeless tobacco.  She  reports that she does not currently use alcohol.  She  reports that she does not currently use drugs.    Physical Exam  Constitutional:       Appearance: She is ill-appearing.    HENT:      Head: Normocephalic.      Right Ear: Tympanic membrane normal.      Left Ear: Tympanic membrane normal.      Nose: Nose normal.   Eyes:      Conjunctiva/sclera: Conjunctivae normal.   Cardiovascular:      Pulses: Normal pulses.      Heart sounds: Normal heart sounds.   Pulmonary:      Effort: Pulmonary effort is normal.      Breath sounds: Normal breath sounds.   Abdominal:      General: Bowel sounds are normal.   Musculoskeletal:         General: Tenderness present.      Cervical back: Normal range of motion.   Skin:     General: Skin is warm and dry.   Neurological:      Mental Status: She is oriented to person, place, and time.   Psychiatric:         Mood and Affect: Mood normal.         WBC   Date/Time Value Ref Range Status   09/23/2023 08:51 AM 9.2 4.4 - 11.3 x10E9/L Final   09/22/2023 08:58 AM 8.0 4.4 - 11.3 x10E9/L Final   09/22/2023 05:24 AM CANCELED       Comment:     Result canceled by the ancillary.     nRBC   Date Value Ref Range Status   09/23/2023 5.5 0.0 - 0.0 /100 WBC Final   09/22/2023 4.0 0.0 - 0.0 /100 WBC Final   09/22/2023 CANCELED       Comment:     Result canceled by the ancillary.     RBC   Date Value Ref Range Status   09/23/2023 3.17 (L) 4.00 - 5.20 x10E12/L Final   09/22/2023 3.20 (L) 4.00 - 5.20 x10E12/L Final   09/22/2023 CANCELED       Comment:     Result canceled by the ancillary.     Hemoglobin   Date Value Ref Range Status   09/23/2023 8.2 (L) 12.0 - 16.0 g/dL Final   09/22/2023 8.7 (L) 12.0 - 16.0 g/dL Final   09/22/2023 CANCELED       Comment:     Result canceled by the ancillary.     Hematocrit   Date Value Ref Range Status   09/23/2023 24.4 (L) 36.0 - 46.0 % Final   09/22/2023 25.3 (L) 36.0 - 46.0 % Final   09/22/2023 CANCELED       Comment:     Result canceled by the ancillary.     MCV   Date/Time Value Ref Range Status   09/23/2023 08:51 AM 77 (L) 80 - 100 fL Final   09/22/2023 08:58 AM 79 (L) 80 - 100 fL Final   09/22/2023 05:24 AM CANCELED       Comment:      Result canceled by the ancillary.     MCH   Date/Time Value Ref Range Status   08/12/2023 03:07 PM 23.7 (L) 26 - 34 PG Final     MCHC   Date/Time Value Ref Range Status   09/23/2023 08:51 AM 33.6 32.0 - 36.0 g/dL Final   09/22/2023 08:58 AM 34.4 32.0 - 36.0 g/dL Final   09/22/2023 05:24 AM CANCELED       Comment:     Result canceled by the ancillary.     RDW   Date/Time Value Ref Range Status   09/23/2023 08:51 AM 26.3 (H) 11.5 - 14.5 % Final   09/22/2023 08:58 AM 26.4 (H) 11.5 - 14.5 % Final   09/22/2023 05:24 AM CANCELED       Comment:     Result canceled by the ancillary.     Platelets   Date/Time Value Ref Range Status   09/23/2023 08:51  150 - 450 x10E9/L Final   09/22/2023 08:58  150 - 450 x10E9/L Final   09/22/2023 05:24 AM CANCELED       Comment:     Result canceled by the ancillary.     MPV   Date/Time Value Ref Range Status   08/12/2023 03:07 PM 9.4 7.0 - 12.6 CU Final     Neutrophils %   Date/Time Value Ref Range Status   09/22/2023 05:24 AM CANCELED       Comment:     Result canceled by the ancillary.   09/21/2023 07:15 AM CANCELED       Comment:     Result canceled by the ancillary.   09/18/2023 10:04 AM 76.1 40.0 - 80.0 % Final     Immature Granulocytes %, Automated   Date/Time Value Ref Range Status   09/23/2023 08:51 AM 0.3 0.0 - 0.9 % Final     Comment:      Immature Granulocyte Count (IG) includes promyelocytes,    myelocytes and metamyelocytes but does not include bands.   Percent differential counts (%) should be interpreted in the   context of the absolute cell counts (cells/L).     09/22/2023 08:58 AM 0.4 0.0 - 0.9 % Final     Comment:      Immature Granulocyte Count (IG) includes promyelocytes,    myelocytes and metamyelocytes but does not include bands.   Percent differential counts (%) should be interpreted in the   context of the absolute cell counts (cells/L).     09/22/2023 05:24 AM CANCELED       Comment:      Immature Granulocyte Count (IG) includes promyelocytes,     myelocytes and metamyelocytes but does not include bands.   Percent differential counts (%) should be interpreted in the   context of the absolute cell counts (cells/L).    Result canceled by the ancillary.       Lymphocytes %   Date/Time Value Ref Range Status   09/23/2023 08:51 AM 27.4 13.0 - 44.0 % Final   09/22/2023 08:58 AM 20.9 13.0 - 44.0 % Final   09/22/2023 05:24 AM CANCELED       Comment:     Result canceled by the ancillary.   09/21/2023 01:37 PM 20.0 13.0 - 44.0 % Final     Monocytes %   Date/Time Value Ref Range Status   09/23/2023 08:51 AM 8.5 2.0 - 10.0 % Final   09/22/2023 08:58 AM 4.3 2.0 - 10.0 % Final   09/22/2023 05:24 AM CANCELED       Comment:     Result canceled by the ancillary.   09/21/2023 01:37 PM 2.6 2.0 - 10.0 % Final     Eosinophils %   Date/Time Value Ref Range Status   09/23/2023 08:51 AM 3.4 0.0 - 6.0 % Final   09/22/2023 08:58 AM 4.4 0.0 - 6.0 % Final   09/22/2023 05:24 AM CANCELED       Comment:     Result canceled by the ancillary.   09/21/2023 01:37 PM 0.0 0.0 - 6.0 % Final     Basophils %   Date/Time Value Ref Range Status   09/23/2023 08:51 AM 0.0 0.0 - 2.0 % Final   09/22/2023 08:58 AM 0.0 0.0 - 2.0 % Final   09/22/2023 05:24 AM CANCELED       Comment:     Result canceled by the ancillary.   09/21/2023 01:37 PM 0.0 0.0 - 2.0 % Final     Neutrophils Absolute   Date/Time Value Ref Range Status   09/22/2023 05:24 AM CANCELED       Comment:     Result canceled by the ancillary.   09/21/2023 07:15 AM CANCELED       Comment:     Result canceled by the ancillary.   09/18/2023 10:04 AM 7.46 1.20 - 7.70 x10E9/L Final     Immature Granulocytes Absolute, Automated   Date/Time Value Ref Range Status   08/12/2023 03:07 PM 0.12 (H) 0.0 - 0.1 K/UL Final     Lymphocytes Absolute   Date/Time Value Ref Range Status   09/22/2023 05:24 AM CANCELED       Comment:     Result canceled by the ancillary.   09/21/2023 07:15 AM CANCELED       Comment:     Result canceled by the ancillary.   09/18/2023  "10:04 AM 1.41 1.20 - 4.80 x10E9/L Final     Monocytes Absolute   Date/Time Value Ref Range Status   09/22/2023 05:24 AM CANCELED       Comment:     Result canceled by the ancillary.   09/21/2023 07:15 AM CANCELED       Comment:     Result canceled by the ancillary.   09/18/2023 10:04 AM 0.74 0.10 - 1.00 x10E9/L Final     Eosinophils Absolute   Date/Time Value Ref Range Status   09/23/2023 08:51 AM 0.31 0.00 - 0.70 x10E9/L Final   09/22/2023 08:58 AM 0.35 0.00 - 0.70 x10E9/L Final   09/22/2023 05:24 AM CANCELED       Comment:     Result canceled by the ancillary.   09/21/2023 01:37 PM 0.00 0.00 - 0.70 x10E9/L Final     Basophils Absolute   Date/Time Value Ref Range Status   09/23/2023 08:51 AM 0.00 0.00 - 0.10 x10E9/L Final   09/22/2023 08:58 AM 0.00 0.00 - 0.10 x10E9/L Final   09/22/2023 05:24 AM CANCELED       Comment:     Result canceled by the ancillary.   09/21/2023 01:37 PM 0.00 0.00 - 0.10 x10E9/L Final       No components found for: \"PT\"  aPTT   Date/Time Value Ref Range Status   09/23/2023 08:51 AM 30 27 - 38 sec Final     Comment:     Note new reference range as of 6/20/2023 at 10:00am.   09/22/2023 08:58 AM 29 27 - 38 sec Final     Comment:     Note new reference range as of 6/20/2023 at 10:00am.   09/21/2023 07:15 AM CANCELED       Comment:     Note new reference range as of 6/20/2023 at 10:00am.    Result canceled by the ancillary.         Assessment/Plan        - increase duloxetine to 40mg daily, send new rx  - send oxycodone rx  - update care plan in EPIC   Next fuv prior to RBCX  Will consider pain management referral- will need cardiac clearance prior to treatment after rheumatology assessment      Senait Narvaez is a 53 year old with   1. History of Hb SC SCD and chronic pain   - oral tylenol as needed for mild to moderate pain   - oral oxycodone 10 mg every 4-6 hours as needed for moderate to severe and breakthrough pain   - oral duloxetine for chronic pain and aslo anxiety/depression   - reviewed " OARRS  - discussed non pharmacologic methods of pain control     2. Encounter for DMT with full rbc exchange every 5 weeks and next scheduled on 9/20/23, indication being MSOF and chronic pain. She has very poor venous access and using femoral veins for apheresis   - proceed with planned full exchange as scheduled. This will be performed under telemetry on account of recurrent arrythmia     3. History of recurrent VTE/PE with SVC syndrome. She is on life long anticoagulation with warfarin   - continue oral warfarin 5 mg daily with target INR of 2-3     4.  History of bronchial asthma and seasonal allergies   - as needed albuterol   - continue inhaled nasal steroids   - continue as needed loratidine   - observe cough and if worsens, will give us a call     5. History of insominia   - continue oral trazodone     6. History of CAN on CPAP with 2L of supplemental oxygen    7. Anemia with HB of 11.2 g/dl   - continue daily folic acid     8. Chronic constipation   - colase and miralax as needed     9. History of tachycardia and arrythmia/SVT, well controlled   - metoprolol as prescribed   - follow up with cardiology as scheduled     10. CKD with  eGFR of 75  - follow up with nephrology as scheduled   - avoid nephrotoxic medications     11. Pulmonary hypertension   - follow up with pulmonology as scheduled     12. Rheum: Positive ISABELLE and reflex- referred to rheumatology          Problem List Items Addressed This Visit    None  Visit Diagnoses       Sickle cell disease with crisis (CMS/HCC)    -  Primary    Relevant Orders    Calcium, Ionized    CBC and Auto Differential    Comprehensive Metabolic Panel    Reticulocytes    Lactate Dehydrogenase    HEMOGLOBIN IDENTIFICATION WITH PATH REVIEW    Ferritin    Blood typing and cross match

## 2023-12-06 NOTE — CARE PLAN
Comments: Adult Sickle Cell Disease (SCD) Program Patient Care Plan     MR number:  [id number]   Patient name: Senait Narvaez    YOB: 1970   Sickle cell providers: Adult Sickle Cell Disease Team   Sickle cell type: SC     Critically important information    1. This care plan expires 2 years after it was last modified. It was last modified _____23_. If this care plan is , contact the adult SCD team for help.     Mon-Fri, 8:30 a.m. to 5 p.m. - use Acute care clinic nurse practitioner pager [phone].      2. 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 Has been evaluated for BMT but donor was considered to be too high risk.   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   History of acute chest syndrome.   Question of fibromyalgia.   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   Obstructive Sleep and significant nocturnal hemoglobin desaturation (greater than 40% of sleep time with an SaO2 of less than 90%).   Bilateral lower extremity edema, recurrent  SVC syndrome: She was diagnosed with SVC syndrome, she had Bilateral Upper Extremity and Facial Swelling d/t partial filling defect within the SVC and a thrombus of the SVC complicated by bilateral Mediport catheters. Vasc med consulted, rec lifelong coumadin. She was on heparin drip bridge and coumadin was initiated on . INR 3.0 on 2019. She is status post Venogram, SVC balloon angioplasty and Right mediport removal (1/15/20) and status post placement of left IJ temporary apharesis catheter, SVC angiogram, Balloon angioplasty of SVC/left brachiocephalic vein, Removal of left sided Mediport catheter (20).  She was given IV diuretics lasix 2/26-29 for UE edema 2/2 SVC syndrome with edema. Breast remain swollen recommended breast binder. SVC in Nov 2022 with stent placed.   Syncopal episodes - referred to Neuro   11/22 hospitalization for SVC stricture; s/p SVC angioplasty, which was successful.  FUV in IR 12/9/22.   Bilateral Upper Extremity and Facial Swelling d/t partial filling defect within the SVC and a thrombus of the SVC complicated by bilateral Mediport catheters.   Balloon removed via IR on April 2023.   SVT event with conversion with 1 dose 6mg of adenosine on 5/5/23  Admission June 2023 underwent RHC/LHC on 6/16 with mPA pressure 36, wedge 14, CI 4.2 and her coronary angiogram revealed no angiographic evidence of obstructive CAD. Dx of pulm HTN.   16. Positive ISABELLE with reflex- upcoming rheumatology appt           See patient's most recent outpatient note for additional medical history.      3. Do not give IV Benadryl with opioids because it potentiates the dysphoric effects of opioids. History of nocturnal hypoxia, please provide pt with 2L of nasal cannula oxygen when treating with opioid pain medication.        Pain management in the ED or Acute Care Clinic ([facility])   1. Ask the patient: What pain medicine have you taken? When did you last take it, what was the dose, and how many/much did you take?      2. Treat patient's pain first. This can be done while labs are pending. Reassess their pain 30 minutes after treatment. This will help you decide sooner how to triage the patient.      3. Hold opioid dose if patient is sedated     4. Individualized pain management plan for ED or [facility]:                  For sickle cell pain crisis:    For sickle cell pain crisis: Hydromorphone 0.5mg SQ/IV 1hr x 3 doses   Can increase to 1mg Q1hr if needed   PO Benadryl 25mg for opioid induced pruritus  PO Zofran 8mg for nausea  Can consider cyclobenzaprine as adjuvants, if no contraindications  Apply O2 for nocturnal hypoxia        For severe pain with objective findings: __________     Admit patient for:    Pain crisis - if this care plan states patient has h/o chronic pain, admit at your discretion     Fever of 38.5°C or higher    Fever of 38°C or higher with concern for infection    Respiratory symptoms     Stroke    Non-SCD related health problems (e.g. acute abdomen, ARF, uncontrolled BP, cholecystitis)     Greater than 1.0 g/dL drop from baseline hemoglobin    Unexpected rise in LDH     If patient is ADMITTED:       1. Before ordering and calculating appropriate pain med dose, obtain an OARSS report to confirm patient's controlled substance rx history. For help getting an OARSS report, talk to your supervising resident or attending.      2. Confirm home pain med use with patient by asking what they last took for pain and when they took it. If pt on long acting or adjuvant for chronic pain per OARRS or clinic note, please continue this dosing     3. Activate the Adult sickle cell order set in  Care      4. Continue Acute care clinic or ED management of patient's pain until Adult Sickle Cell order set is activated.     5.            For INPATIENT pain management patient may have:     For mild to moderate pain: ____0.5mg IVP Q3 PRN ____  For moderate to severe pain: _______1-1.5mg IVP Q3 PRN____      6. Assess patient frequently (at least daily) for signs of worsening crisis, such as increasing or new pain, infection or worsening pulmonary symptoms. Due to radiation exposure risks, only order radiology tests when clinically indicated.      If patient has cough, new or worsening chest pain or respiratory symptoms or fever, a CXR to r/o impending acute chest syndrome is appropriate. Otherwise, this is not necessary     7. Upon discharge: Instruct patient to call SCD navigator to determine follow-up appointment. Weekends/holidays: do not give more than 20 pills of short-acting pain medicine. During the week: instruct patient to call  the Adult SCD clinic at [phone] for pain med refills; do not give patient any pain med prescriptions.      This care plan reflects the information on this patient that is currently known. If this care plan is missing key information or is incorrect, please notify the Adult Sickle Cell Team (or email 'Adult Sickle Cell Team') ASAP. This care plan is a guide and not a substitute for clinical judgment.

## 2023-12-07 ENCOUNTER — DOCUMENTATION (OUTPATIENT)
Dept: CARE COORDINATION | Facility: CLINIC | Age: 53
End: 2023-12-07
Payer: COMMERCIAL

## 2023-12-07 ENCOUNTER — HOSPITAL ENCOUNTER (INPATIENT)
Facility: HOSPITAL | Age: 53
LOS: 2 days | Discharge: HOME | DRG: 812 | End: 2023-12-11
Attending: INTERNAL MEDICINE | Admitting: INTERNAL MEDICINE
Payer: COMMERCIAL

## 2023-12-07 ENCOUNTER — HOSPITAL ENCOUNTER (INPATIENT)
Facility: HOSPITAL | Age: 53
LOS: 1 days | Discharge: STILL ADMITTED OR ADMINISTRATIVE | DRG: 812 | End: 2023-12-07
Attending: INTERNAL MEDICINE | Admitting: INTERNAL MEDICINE
Payer: COMMERCIAL

## 2023-12-07 ENCOUNTER — APPOINTMENT (OUTPATIENT)
Dept: RADIOLOGY | Facility: HOSPITAL | Age: 53
DRG: 812 | End: 2023-12-07
Payer: COMMERCIAL

## 2023-12-07 VITALS
TEMPERATURE: 95.7 F | SYSTOLIC BLOOD PRESSURE: 108 MMHG | HEART RATE: 104 BPM | RESPIRATION RATE: 16 BRPM | DIASTOLIC BLOOD PRESSURE: 73 MMHG | OXYGEN SATURATION: 97 %

## 2023-12-07 DIAGNOSIS — D57.09 SICKLE CELL DISEASE WITH CRISIS AND OTHER COMPLICATION (MULTI): Primary | ICD-10-CM

## 2023-12-07 PROBLEM — D57.1 HB-SS DISEASE WITHOUT CRISIS (MULTI): Status: ACTIVE | Noted: 2023-12-07

## 2023-12-07 LAB
APTT PPP: 31 SECONDS (ref 27–38)
INR PPP: 1 (ref 0.9–1.1)
PROTHROMBIN TIME: 11.1 SECONDS (ref 9.8–12.8)

## 2023-12-07 PROCEDURE — C1752 CATH,HEMODIALYSIS,SHORT-TERM: HCPCS | Performed by: STUDENT IN AN ORGANIZED HEALTH CARE EDUCATION/TRAINING PROGRAM

## 2023-12-07 PROCEDURE — 85730 THROMBOPLASTIN TIME PARTIAL: CPT | Performed by: NURSE PRACTITIONER

## 2023-12-07 PROCEDURE — 02HV33Z INSERTION OF INFUSION DEVICE INTO SUPERIOR VENA CAVA, PERCUTANEOUS APPROACH: ICD-10-PCS | Performed by: PHYSICIAN ASSISTANT

## 2023-12-07 PROCEDURE — 36556 INSERT NON-TUNNEL CV CATH: CPT | Performed by: STUDENT IN AN ORGANIZED HEALTH CARE EDUCATION/TRAINING PROGRAM

## 2023-12-07 PROCEDURE — C1769 GUIDE WIRE: HCPCS | Performed by: STUDENT IN AN ORGANIZED HEALTH CARE EDUCATION/TRAINING PROGRAM

## 2023-12-07 PROCEDURE — 2780000003 HC OR 278 NO HCPCS: Performed by: STUDENT IN AN ORGANIZED HEALTH CARE EDUCATION/TRAINING PROGRAM

## 2023-12-07 PROCEDURE — 99153 MOD SED SAME PHYS/QHP EA: CPT | Performed by: STUDENT IN AN ORGANIZED HEALTH CARE EDUCATION/TRAINING PROGRAM

## 2023-12-07 PROCEDURE — G0379 DIRECT REFER HOSPITAL OBSERV: HCPCS

## 2023-12-07 PROCEDURE — 99152 MOD SED SAME PHYS/QHP 5/>YRS: CPT | Performed by: STUDENT IN AN ORGANIZED HEALTH CARE EDUCATION/TRAINING PROGRAM

## 2023-12-07 PROCEDURE — 36430 TRANSFUSION BLD/BLD COMPNT: CPT

## 2023-12-07 PROCEDURE — 1170000001 HC PRIVATE ONCOLOGY ROOM DAILY

## 2023-12-07 PROCEDURE — 36556 INSERT NON-TUNNEL CV CATH: CPT | Mod: GC | Performed by: STUDENT IN AN ORGANIZED HEALTH CARE EDUCATION/TRAINING PROGRAM

## 2023-12-07 PROCEDURE — 2500000004 HC RX 250 GENERAL PHARMACY W/ HCPCS (ALT 636 FOR OP/ED): Performed by: STUDENT IN AN ORGANIZED HEALTH CARE EDUCATION/TRAINING PROGRAM

## 2023-12-07 PROCEDURE — G0378 HOSPITAL OBSERVATION PER HR: HCPCS

## 2023-12-07 PROCEDURE — 2720000007 HC OR 272 NO HCPCS: Performed by: STUDENT IN AN ORGANIZED HEALTH CARE EDUCATION/TRAINING PROGRAM

## 2023-12-07 PROCEDURE — C1894 INTRO/SHEATH, NON-LASER: HCPCS | Performed by: STUDENT IN AN ORGANIZED HEALTH CARE EDUCATION/TRAINING PROGRAM

## 2023-12-07 PROCEDURE — 2500000001 HC RX 250 WO HCPCS SELF ADMINISTERED DRUGS (ALT 637 FOR MEDICARE OP): Performed by: NURSE PRACTITIONER

## 2023-12-07 PROCEDURE — 76937 US GUIDE VASCULAR ACCESS: CPT | Performed by: STUDENT IN AN ORGANIZED HEALTH CARE EDUCATION/TRAINING PROGRAM

## 2023-12-07 PROCEDURE — 77001 FLUOROGUIDE FOR VEIN DEVICE: CPT | Performed by: STUDENT IN AN ORGANIZED HEALTH CARE EDUCATION/TRAINING PROGRAM

## 2023-12-07 PROCEDURE — 99223 1ST HOSP IP/OBS HIGH 75: CPT | Performed by: NURSE PRACTITIONER

## 2023-12-07 PROCEDURE — 2500000004 HC RX 250 GENERAL PHARMACY W/ HCPCS (ALT 636 FOR OP/ED): Performed by: NURSE PRACTITIONER

## 2023-12-07 RX ORDER — MIDAZOLAM HYDROCHLORIDE 1 MG/ML
INJECTION INTRAMUSCULAR; INTRAVENOUS
Status: COMPLETED | OUTPATIENT
Start: 2023-12-07 | End: 2023-12-07

## 2023-12-07 RX ORDER — DULOXETIN HYDROCHLORIDE 20 MG/1
40 CAPSULE, DELAYED RELEASE ORAL DAILY
Status: DISCONTINUED | OUTPATIENT
Start: 2023-12-07 | End: 2023-12-11 | Stop reason: HOSPADM

## 2023-12-07 RX ORDER — METOPROLOL TARTRATE 25 MG/1
25 TABLET, FILM COATED ORAL 2 TIMES DAILY
Status: DISCONTINUED | OUTPATIENT
Start: 2023-12-07 | End: 2023-12-11 | Stop reason: HOSPADM

## 2023-12-07 RX ORDER — ONDANSETRON 4 MG/1
4 TABLET, FILM COATED ORAL EVERY 8 HOURS PRN
Status: DISCONTINUED | OUTPATIENT
Start: 2023-12-07 | End: 2023-12-11 | Stop reason: HOSPADM

## 2023-12-07 RX ORDER — HYDROMORPHONE HYDROCHLORIDE 1 MG/ML
1.5 INJECTION, SOLUTION INTRAMUSCULAR; INTRAVENOUS; SUBCUTANEOUS
Status: DISCONTINUED | OUTPATIENT
Start: 2023-12-07 | End: 2023-12-11 | Stop reason: HOSPADM

## 2023-12-07 RX ORDER — ALBUTEROL SULFATE 90 UG/1
2 AEROSOL, METERED RESPIRATORY (INHALATION) EVERY 4 HOURS PRN
Status: DISCONTINUED | OUTPATIENT
Start: 2023-12-07 | End: 2023-12-11 | Stop reason: HOSPADM

## 2023-12-07 RX ORDER — FOLIC ACID 1 MG/1
1 TABLET ORAL DAILY
Status: DISCONTINUED | OUTPATIENT
Start: 2023-12-07 | End: 2023-12-11 | Stop reason: HOSPADM

## 2023-12-07 RX ORDER — OXYCODONE HYDROCHLORIDE 5 MG/1
10 TABLET ORAL EVERY 4 HOURS PRN
Status: DISCONTINUED | OUTPATIENT
Start: 2023-12-07 | End: 2023-12-11 | Stop reason: HOSPADM

## 2023-12-07 RX ORDER — CYCLOBENZAPRINE HCL 10 MG
10 TABLET ORAL 3 TIMES DAILY PRN
Status: ON HOLD | COMMUNITY
End: 2024-01-18 | Stop reason: WASHOUT

## 2023-12-07 RX ORDER — MULTIVIT-MIN/IRON FUM/FOLIC AC 7.5 MG-4
1 TABLET ORAL DAILY
Status: DISCONTINUED | OUTPATIENT
Start: 2023-12-07 | End: 2023-12-11 | Stop reason: HOSPADM

## 2023-12-07 RX ORDER — HYDROMORPHONE HYDROCHLORIDE 1 MG/ML
1.5 INJECTION, SOLUTION INTRAMUSCULAR; INTRAVENOUS; SUBCUTANEOUS ONCE
Status: DISCONTINUED | OUTPATIENT
Start: 2023-12-07 | End: 2023-12-07 | Stop reason: HOSPADM

## 2023-12-07 RX ORDER — FUROSEMIDE 20 MG/1
20 TABLET ORAL EVERY OTHER DAY
Status: DISCONTINUED | OUTPATIENT
Start: 2023-12-08 | End: 2023-12-11 | Stop reason: HOSPADM

## 2023-12-07 RX ORDER — FENTANYL CITRATE 50 UG/ML
INJECTION, SOLUTION INTRAMUSCULAR; INTRAVENOUS
Status: COMPLETED | OUTPATIENT
Start: 2023-12-07 | End: 2023-12-07

## 2023-12-07 RX ORDER — TRAZODONE HYDROCHLORIDE 50 MG/1
50 TABLET ORAL NIGHTLY PRN
Status: DISCONTINUED | OUTPATIENT
Start: 2023-12-07 | End: 2023-12-11 | Stop reason: HOSPADM

## 2023-12-07 RX ORDER — HYDROMORPHONE HYDROCHLORIDE 2 MG/1
2 TABLET ORAL EVERY 4 HOURS PRN
Status: ON HOLD | COMMUNITY
End: 2024-01-18 | Stop reason: WASHOUT

## 2023-12-07 RX ORDER — LORATADINE 10 MG/1
10 TABLET ORAL DAILY PRN
Status: DISCONTINUED | OUTPATIENT
Start: 2023-12-07 | End: 2023-12-11 | Stop reason: HOSPADM

## 2023-12-07 RX ORDER — WARFARIN SODIUM 5 MG/1
5 TABLET ORAL DAILY
Status: DISCONTINUED | OUTPATIENT
Start: 2023-12-07 | End: 2023-12-11 | Stop reason: HOSPADM

## 2023-12-07 RX ORDER — AMOXICILLIN 250 MG
2 CAPSULE ORAL 2 TIMES DAILY
Status: DISCONTINUED | OUTPATIENT
Start: 2023-12-07 | End: 2023-12-11 | Stop reason: HOSPADM

## 2023-12-07 RX ADMIN — FENTANYL CITRATE 50 MCG: 50 INJECTION, SOLUTION INTRAMUSCULAR; INTRAVENOUS at 14:05

## 2023-12-07 RX ADMIN — FOLIC ACID 1 MG: 1 TABLET ORAL at 16:04

## 2023-12-07 RX ADMIN — MIDAZOLAM HYDROCHLORIDE 1 MG: 1 INJECTION, SOLUTION INTRAMUSCULAR; INTRAVENOUS at 14:05

## 2023-12-07 RX ADMIN — HYDROMORPHONE HYDROCHLORIDE 1.5 MG: 1 INJECTION, SOLUTION INTRAMUSCULAR; INTRAVENOUS; SUBCUTANEOUS at 15:59

## 2023-12-07 RX ADMIN — DULOXETINE HYDROCHLORIDE 40 MG: 20 CAPSULE, DELAYED RELEASE ORAL at 16:05

## 2023-12-07 RX ADMIN — MIDAZOLAM HYDROCHLORIDE 1 MG: 1 INJECTION, SOLUTION INTRAMUSCULAR; INTRAVENOUS at 13:57

## 2023-12-07 RX ADMIN — OXYCODONE HYDROCHLORIDE 10 MG: 5 TABLET ORAL at 18:03

## 2023-12-07 RX ADMIN — WARFARIN SODIUM 5 MG: 5 TABLET ORAL at 17:39

## 2023-12-07 RX ADMIN — FENTANYL CITRATE 50 MCG: 50 INJECTION, SOLUTION INTRAMUSCULAR; INTRAVENOUS at 13:56

## 2023-12-07 RX ADMIN — SENNOSIDES AND DOCUSATE SODIUM 2 TABLET: 8.6; 5 TABLET ORAL at 16:04

## 2023-12-07 RX ADMIN — HYDROMORPHONE HYDROCHLORIDE 1.5 MG: 1 INJECTION, SOLUTION INTRAMUSCULAR; INTRAVENOUS; SUBCUTANEOUS at 20:36

## 2023-12-07 RX ADMIN — METOPROLOL TARTRATE 25 MG: 25 TABLET, FILM COATED ORAL at 16:04

## 2023-12-07 RX ADMIN — METOPROLOL TARTRATE 25 MG: 25 TABLET, FILM COATED ORAL at 20:36

## 2023-12-07 RX ADMIN — Medication 1 TABLET: at 16:04

## 2023-12-07 SDOH — SOCIAL STABILITY: SOCIAL INSECURITY: HAVE YOU HAD THOUGHTS OF HARMING ANYONE ELSE?: YES

## 2023-12-07 SDOH — SOCIAL STABILITY: SOCIAL INSECURITY: ARE THERE ANY APPARENT SIGNS OF INJURIES/BEHAVIORS THAT COULD BE RELATED TO ABUSE/NEGLECT?: NO

## 2023-12-07 SDOH — SOCIAL STABILITY: SOCIAL INSECURITY: HAS ANYONE EVER THREATENED TO HURT YOUR FAMILY OR YOUR PETS?: NO

## 2023-12-07 SDOH — SOCIAL STABILITY: SOCIAL INSECURITY: DO YOU FEEL ANYONE HAS EXPLOITED OR TAKEN ADVANTAGE OF YOU FINANCIALLY OR OF YOUR PERSONAL PROPERTY?: NO

## 2023-12-07 SDOH — SOCIAL STABILITY: SOCIAL INSECURITY: DOES ANYONE TRY TO KEEP YOU FROM HAVING/CONTACTING OTHER FRIENDS OR DOING THINGS OUTSIDE YOUR HOME?: NO

## 2023-12-07 SDOH — SOCIAL STABILITY: SOCIAL INSECURITY: ABUSE: ADULT

## 2023-12-07 SDOH — SOCIAL STABILITY: SOCIAL INSECURITY: ARE YOU OR HAVE YOU BEEN THREATENED OR ABUSED PHYSICALLY, EMOTIONALLY, OR SEXUALLY BY ANYONE?: NO

## 2023-12-07 SDOH — SOCIAL STABILITY: SOCIAL INSECURITY: WERE YOU ABLE TO COMPLETE ALL THE BEHAVIORAL HEALTH SCREENINGS?: YES

## 2023-12-07 SDOH — SOCIAL STABILITY: SOCIAL INSECURITY: DO YOU FEEL UNSAFE GOING BACK TO THE PLACE WHERE YOU ARE LIVING?: NO

## 2023-12-07 SDOH — SOCIAL STABILITY: SOCIAL INSECURITY: HAVE YOU HAD THOUGHTS OF HARMING ANYONE ELSE?: NO

## 2023-12-07 ASSESSMENT — PATIENT HEALTH QUESTIONNAIRE - PHQ9
1. LITTLE INTEREST OR PLEASURE IN DOING THINGS: NOT AT ALL
1. LITTLE INTEREST OR PLEASURE IN DOING THINGS: NOT AT ALL
2. FEELING DOWN, DEPRESSED OR HOPELESS: NOT AT ALL
SUM OF ALL RESPONSES TO PHQ9 QUESTIONS 1 & 2: 0
SUM OF ALL RESPONSES TO PHQ9 QUESTIONS 1 & 2: 0
2. FEELING DOWN, DEPRESSED OR HOPELESS: NOT AT ALL

## 2023-12-07 ASSESSMENT — PAIN SCALES - GENERAL
PAINLEVEL_OUTOF10: 9
PAINLEVEL_OUTOF10: 10 - WORST POSSIBLE PAIN
PAINLEVEL_OUTOF10: 0 - NO PAIN
PAINLEVEL_OUTOF10: 9
PAINLEVEL_OUTOF10: 0 - NO PAIN
PAINLEVEL_OUTOF10: 6
PAINLEVEL_OUTOF10: 6
PAINLEVEL_OUTOF10: 0 - NO PAIN
PAINLEVEL_OUTOF10: 6
PAINLEVEL_OUTOF10: 0 - NO PAIN
PAINLEVEL_OUTOF10: 10 - WORST POSSIBLE PAIN
PAINLEVEL_OUTOF10: 0 - NO PAIN

## 2023-12-07 ASSESSMENT — COGNITIVE AND FUNCTIONAL STATUS - GENERAL
PATIENT BASELINE BEDBOUND: NO
DAILY ACTIVITIY SCORE: 24
DAILY ACTIVITIY SCORE: 24
MOBILITY SCORE: 24
MOBILITY SCORE: 24

## 2023-12-07 ASSESSMENT — LIFESTYLE VARIABLES
HOW OFTEN DO YOU HAVE 6 OR MORE DRINKS ON ONE OCCASION: NEVER
AUDIT-C TOTAL SCORE: 0
HOW MANY STANDARD DRINKS CONTAINING ALCOHOL DO YOU HAVE ON A TYPICAL DAY: PATIENT DOES NOT DRINK
HOW OFTEN DO YOU HAVE A DRINK CONTAINING ALCOHOL: NEVER
SKIP TO QUESTIONS 9-10: 1
HOW OFTEN DO YOU HAVE A DRINK CONTAINING ALCOHOL: NEVER
AUDIT-C TOTAL SCORE: 0
SKIP TO QUESTIONS 9-10: 1
HOW OFTEN DO YOU HAVE 6 OR MORE DRINKS ON ONE OCCASION: NEVER
HOW MANY STANDARD DRINKS CONTAINING ALCOHOL DO YOU HAVE ON A TYPICAL DAY: PATIENT DOES NOT DRINK
AUDIT-C TOTAL SCORE: 0
AUDIT-C TOTAL SCORE: 0

## 2023-12-07 ASSESSMENT — ACTIVITIES OF DAILY LIVING (ADL)
LACK_OF_TRANSPORTATION: NO
JUDGMENT_ADEQUATE_SAFELY_COMPLETE_DAILY_ACTIVITIES: YES
HEARING - RIGHT EAR: FUNCTIONAL
FEEDING YOURSELF: INDEPENDENT
WALKS IN HOME: INDEPENDENT
HEARING - LEFT EAR: FUNCTIONAL
JUDGMENT_ADEQUATE_SAFELY_COMPLETE_DAILY_ACTIVITIES: YES
HEARING - LEFT EAR: FUNCTIONAL
TOILETING: INDEPENDENT
BATHING: INDEPENDENT
GROOMING: INDEPENDENT
DRESSING YOURSELF: INDEPENDENT
DRESSING YOURSELF: INDEPENDENT
TOILETING: INDEPENDENT
PATIENT'S MEMORY ADEQUATE TO SAFELY COMPLETE DAILY ACTIVITIES?: YES
HEARING - RIGHT EAR: FUNCTIONAL
BATHING: INDEPENDENT
FEEDING YOURSELF: INDEPENDENT
WALKS IN HOME: INDEPENDENT
GROOMING: INDEPENDENT
ADEQUATE_TO_COMPLETE_ADL: YES
LACK_OF_TRANSPORTATION: NO
PATIENT'S MEMORY ADEQUATE TO SAFELY COMPLETE DAILY ACTIVITIES?: YES
ADEQUATE_TO_COMPLETE_ADL: YES

## 2023-12-07 ASSESSMENT — ENCOUNTER SYMPTOMS
RESPIRATORY NEGATIVE: 1
NEUROLOGICAL NEGATIVE: 1
CONSTITUTIONAL NEGATIVE: 1
PSYCHIATRIC NEGATIVE: 1
MUSCULOSKELETAL NEGATIVE: 1
CONSTIPATION: 0
DIFFICULTY URINATING: 0
NAUSEA: 0
DIARRHEA: 0
VOMITING: 0
ABDOMINAL PAIN: 0
FREQUENCY: 0
CARDIOVASCULAR NEGATIVE: 1
DYSURIA: 0

## 2023-12-07 ASSESSMENT — COLUMBIA-SUICIDE SEVERITY RATING SCALE - C-SSRS
1. IN THE PAST MONTH, HAVE YOU WISHED YOU WERE DEAD OR WISHED YOU COULD GO TO SLEEP AND NOT WAKE UP?: NO
2. HAVE YOU ACTUALLY HAD ANY THOUGHTS OF KILLING YOURSELF?: NO
6. HAVE YOU EVER DONE ANYTHING, STARTED TO DO ANYTHING, OR PREPARED TO DO ANYTHING TO END YOUR LIFE?: NO
1. IN THE PAST MONTH, HAVE YOU WISHED YOU WERE DEAD OR WISHED YOU COULD GO TO SLEEP AND NOT WAKE UP?: NO
6. HAVE YOU EVER DONE ANYTHING, STARTED TO DO ANYTHING, OR PREPARED TO DO ANYTHING TO END YOUR LIFE?: NO
2. HAVE YOU ACTUALLY HAD ANY THOUGHTS OF KILLING YOURSELF?: NO

## 2023-12-07 ASSESSMENT — PAIN - FUNCTIONAL ASSESSMENT

## 2023-12-07 NOTE — PROGRESS NOTES
Pharmacy Medication History Review    Senait Narvaez is a 53 y.o. female admitted for Sickle cell disease with crisis and other complication (CMS/[facility]). Pharmacy reviewed the patient's [id number] medications and allergies for accuracy.    The list below reflects the updated PTA list. Comments regarding how patient may be taking medications differently can be found in the Admit Orders Activity  Prior to Admission Medications   Prescriptions Last Dose Informant   DULoxetine (Cymbalta) 20 mg DR capsule 12/6/2023 Self   Sig: Take 1 capsule (20 mg) by mouth once daily for 1 day, THEN 2 capsules (40 mg) once daily. Do not crush or chew..   HYDROmorphone (Dilaudid) 2 mg tablet Past Month Self   Sig: Take 1 tablet (2 mg) by mouth every 4 hours if needed for severe pain (7 - 10).   albuterol 90 mcg/actuation inhaler Past Week Self   Sig: Inhale 2 puffs every 4 hours if needed for shortness of breath or wheezing.   cyclobenzaprine (Flexeril) 10 mg tablet Past Month Self   Sig: Take 1 tablet (10 mg) by mouth 3 times a day as needed for muscle spasms.   folic acid (Folvite) 1 mg tablet 12/6/2023 Self   Sig: Take 1 tablet (1 mg) by mouth once daily.   furosemide (Lasix) 20 mg tablet 12/6/2023 Self   Sig: Take 1 tablet (20 mg) by mouth every other day.   loratadine (Claritin) 10 mg tablet More than a month Self   Sig: Take 1 tablet (10 mg) by mouth once daily as needed for allergies.   metoprolol tartrate (Lopressor) 25 mg tablet 12/6/2023 Self   Sig: Take 1 tablet (25 mg) by mouth 2 times a day.   mometasone (Nasonex) 50 mcg/actuation nasal spray More than a month Self   Sig: Administer 1 spray into each nostril once daily as needed (allergies).   multivitamin tablet 12/6/2023 Self   Sig: Take 1 tablet by mouth once daily.   ondansetron (Zofran) 4 mg tablet Past Week Self   Sig: Take 1 tablet (4 mg) by mouth every 8 hours if needed for nausea or vomiting.   oxyCODONE (Roxicodone) 10 mg immediate release tablet Past Week Self    Sig: Take 1 tablet (10 mg) by mouth every 4 hours if needed for severe pain (7 - 10) (pain).   oxygen (O2) gas therapy 12/6/2023 Self   Sig: Inhale 1 each continuously.   traZODone (Desyrel) 50 mg tablet 12/6/2023 Self   Sig: Take 1 tablet (50 mg) by mouth as needed at bedtime for sleep. 1/2 - 1 tabs for insomnia   warfarin (Coumadin) 5 mg tablet 12/6/2023 Self   Sig: Take 1 tablet (5 mg) by mouth once daily in the evening.      Facility-Administered Medications: None        The list below reflects the updated allergy list. Please review each documented allergy for additional clarification and justification.  Allergies  Reviewed by Chris Irvin PharmD on 12/7/2023   No Known Allergies         Patient declines M2B at discharge. Pharmacy has been updated to [facility] in [place].    Sources used to complete the med history include   - Medication dispense history  - CareEverywhere records  - OARRS yes  - Patient interview: Patient is a reliable historian    Below are additional concerns with the patient's PTA list.  - Per patient, she has been taking duloxetine 20 mg - 1 capsule by mouth daily. Patient has not yet started taking duloxetine 40 mg yet.    Margarita Irvin PharmD   Meds PGY1 Pharmacy Resident    [facility]  Please reach out via [employer] Secure Chat for questions, or if no response call [employer] or [employer] “[facility]”

## 2023-12-07 NOTE — Clinical Note
Trialysis cath placed, pt tolerated procedure well with 2mg versed and 100mcg fentanyl IVP, VSS. Dressing applied by Dr. Moise is C/D/I. Pt to CU report to RN.

## 2023-12-07 NOTE — PRE-PROCEDURE NOTE
Interventional Radiology Preprocedure Note    Indication for procedure: There were no encounter diagnoses.    Relevant review of systems: NA    Relevant Labs:   Lab Results   Component Value Date    CREATININE 1.37 (H) 12/06/2023    EGFR 46 (L) 12/06/2023    INR 2.20 11/27/2023    PROTIME 27.6 (H) 11/01/2023       Planned Sedation/Anesthesia: Minimal    Airway assessment: normal    Directed physical examination:    Physical Exam  Constitutional:       Appearance: Normal appearance.   HENT:      Head: Normocephalic.      Nose: Nose normal.      Mouth/Throat:      Pharynx: Oropharynx is clear.   Cardiovascular:      Rate and Rhythm: Normal rate and regular rhythm.   Pulmonary:      Effort: Pulmonary effort is normal.      Breath sounds: Normal breath sounds.   Neurological:      Mental Status: She is alert. Mental status is at baseline.          Mallampati: III (soft and hard palate and base of uvula visible)    ASA Score: ASA 2 - Patient with mild systemic disease with no functional limitations    Benefits, risks and alternatives of procedure and planned sedation have been discussed with the patient and/or their representative. All questions answered and they agree to proceed.

## 2023-12-07 NOTE — CARE PLAN
Problem: Fall/Injury  Goal: Not fall by end of shift  Outcome: Progressing  Goal: Be free from injury by end of the shift  Outcome: Progressing  Goal: Verbalize understanding of personal risk factors for fall in the hospital  Outcome: Progressing  Goal: Verbalize understanding of risk factor reduction measures to prevent injury from fall in the home  Outcome: Progressing  Goal: Use assistive devices by end of the shift  Outcome: Progressing  Goal: Pace activities to prevent fatigue by end of the shift  Outcome: Progressing     Problem: Pain  Goal: Takes deep breaths with improved pain control throughout the shift  Outcome: Progressing  Goal: Turns in bed with improved pain control throughout the shift  Outcome: Progressing  Goal: Walks with improved pain control throughout the shift  Outcome: Progressing  Goal: Performs ADL's with improved pain control throughout shift  Outcome: Progressing  Goal: Participates in PT with improved pain control throughout the shift  Outcome: Progressing  Goal: Free from opioid side effects throughout the shift  Outcome: Progressing  Goal: Free from acute confusion related to pain meds throughout the shift  Outcome: Progressing       The clinical goals for the shift include Pt will manage an acceptable pain level less than 7/10 throughout shift.

## 2023-12-07 NOTE — POST-PROCEDURE NOTE
Interventional Radiology Brief Postprocedure Note    Attending: Marcos Moser MD    Assistant: Wolf Moise MD    Diagnosis: Sickle cell    Description of procedure: Right CFV central venous catheter was placed under fluoroscopic guidance. No complications. Please refer to radiology report for further details.      Anesthesia:  Local, moderate sedation.     Complications: None    Estimated Blood Loss:  5 cc's    Medications  As of 12/07/23 1704      sennosides-docusate sodium (Nita-Colace) 8.6-50 mg per tablet 2 tablet (tablet) Total dose:  2 tablet Dosing weight:  107      Date/Time Rate/Dose/Volume Action       12/07/23  1604 2 tablet Given               DULoxetine (Cymbalta) DR capsule 40 mg (mg) Total dose:  40 mg      Date/Time Rate/Dose/Volume Action       12/07/23  1605 40 mg Given               folic acid (Folvite) tablet 1 mg (mg) Total dose:  1 mg      Date/Time Rate/Dose/Volume Action       12/07/23  1604 1 mg Given               loratadine (Claritin) tablet 10 mg (mg) Total dose:  0 mg*   *Administration not included in total     Date/Time Rate/Dose/Volume Action       12/07/23  1558 *10 mg Missed               metoprolol tartrate (Lopressor) tablet 25 mg (mg) Total dose:  25 mg      Date/Time Rate/Dose/Volume Action       12/07/23  1604 25 mg Given               multivitamin with minerals 1 tablet (tablet) Total dose:  1 tablet Dosing weight:  107      Date/Time Rate/Dose/Volume Action       12/07/23  1604 1 tablet Given               HYDROmorphone (Dilaudid) injection 1.5 mg (mg) Total dose:  1.5 mg Dosing weight:  107      Date/Time Rate/Dose/Volume Action       12/07/23  1559 1.5 mg Given               fentaNYL PF (Sublimaze) injection (mcg) Total dose:  100 mcg      Date/Time Rate/Dose/Volume Action       12/07/23  1356 50 mcg Given      1405 50 mcg Given               midazolam (Versed) injection (mg) Total dose:  2 mg      Date/Time Rate/Dose/Volume Action       12/07/23  1357 1 mg Given       1405 1 mg Given                   No specimens collected      See detailed result report with images in PACS.    The patient tolerated the procedure well without incident or complication and is in stable condition.

## 2023-12-07 NOTE — H&P
History Of Present Illness  Senait Narvaez is a 53 y.o. 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 Patient follows with  sickle cell clinic and has had complications with recent outpatient exchanges, including SVT following line placement requiring admission and hypoxia/lethargy following exchange  (found to have new pHTN). She is being admitted for apheresis line placement followed by routine inpatient RBCEx and monitoring.     States she has been doing well at home, managing her sickle cell with her home meds as prescribed. Denies any HA, fevers/chills, dizziness/lightheadedness, cough, congestion, rhinorrhea, sore throat, SOB, CP, palpitations, abdominal pain, n/v/d/c, melena, hematuria, dysuria, urgency/frequency, numbness/tingling, bruising/bleeding, or rashes. Denies any sick contacts or known COVID-19 exposure.  .     Past Medical History  She has a past medical history of Asthma, CHF (congestive heart failure) (CMS/[facility]), Chronic pain disorder, Compression of vein (02/19/2020), and Hypotension, unspecified (12/10/2020).    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 Has been evaluated for BMT but donor was considered to be too high risk.   - Recurrent DVT/PE with lifelong anticoagulation on coumadin  - Cauda equina with saddle numbness, history of bowel/bladder incontinence  - Chronic right hip pain, attributed radiographically to early AVN (sclerosis), with bilateral radicular sxs x 1-2 months.   - History of acute chest syndrome.   - Question of fibromyalgia.   - History of retinal detachment, likely secondary to  sickle cell disease.   - Obstructive Sleep and significant nocturnal hemoglobin desaturation (greater than 40% of sleep time with an SaO2 of less than 90%).   - Bilateral lower extremity edema, recurrent  - SVC syndrome: She was diagnosed with SVC syndrome, she had Bilateral Upper Extremity and Facial Swelling d/t partial filling defect within the SVC and a thrombus of the SVC complicated by bilateral Mediport catheters. Vasc med consulted, rec lifelong coumadin. She was on heparin drip bridge and coumadin was initiated on 1/16. INR 3.0 on 1/29/2019. - She is status post Venogram, SVC balloon angioplasty and Right mediport removal (1/15/20) and status post placement of left IJ temporary apharesis catheter, SVC angiogram, Balloon angioplasty of SVC/left brachiocephalic vein, Removal of left sided Mediport catheter (1/21/20). She was given IV diuretics lasix 2/26-29 for UE edema 2/2 SVC syndrome with edema. - Breast remain swollen recommended breast binder. SVC in Nov 2022 with stent placed.   - Syncopal episodes - referred to Neuro   - 11/22 hospitalization for SVC stricture; s/p SVC angioplasty, which was successful. FUV in IR 12/9/22. Bilateral Upper Extremity and Facial Swelling d/t partial filling defect within the SVC and a thrombus of the SVC complicated by bilateral Mediport catheters.   - Balloon removed via IR on April 2023.   - SVT event with conversion with 1 dose 6mg of adenosine on 5/5/23  - Admission June 2023 underwent RHC/LHC on 6/16 with mPA pressure 36, wedge 14, CI 4.2 and her coronary angiogram revealed no angiographic evidence of obstructive CAD. Dx of pulm HTN.    Surgical History  She has a past surgical history that includes Appendectomy (08/05/2013); Cholecystectomy (08/05/2013)    Family Hx: Lung disease, Cancer  Allergies: NKDA    Social History  She reports that she has quit smoking. Her smoking use included cigarettes. She has been exposed to tobacco smoke. She has never used smokeless  tobacco. She reports that she does not currently use alcohol. She reports that she does not currently use drugs.    Home scheduled medications  DULoxetine, 20 mg, oral, Daily  fluticasone, 2 spray, Each Nostril, Daily  folic acid, 1 mg, oral, Daily  furosemide, 20 mg, oral, Every other day  metoprolol tartrate, 25 mg, oral, BID  traZODone, 50 mg, oral, Nightly  warfarin, 5 mg, oral, Daily     PRN medications: acetaminophen, albuterol, docusate sodium, docusate sodium **OR** polyethylene glycol, loratadine, ondansetron     Review of Systems   Constitutional: Negative.    HENT: Negative.     Respiratory: Negative.     Cardiovascular: Negative.    Gastrointestinal:  Negative for abdominal pain, constipation, diarrhea, nausea and vomiting.   Genitourinary:  Negative for difficulty urinating, dysuria, frequency and urgency.   Musculoskeletal: Negative.    Skin:  Negative for rash.   Neurological: Negative.    Psychiatric/Behavioral: Negative.         Physical Exam  Vitals reviewed.   Constitutional:       Appearance: Normal appearance.   HENT:      Head: Normocephalic and atraumatic.      Nose: Nose normal.      Mouth/Throat:      Mouth: Mucous membranes are moist.      Pharynx: Oropharynx is clear.   Eyes:      Extraocular Movements: Extraocular movements intact.      Pupils: Pupils are equal, round, and reactive to light.   Cardiovascular:      Rate and Rhythm: Normal rate and regular rhythm.      Pulses: Normal pulses.      Heart sounds: Normal heart sounds.   Pulmonary:      Effort: Pulmonary effort is normal.      Breath sounds: Normal breath sounds.   Abdominal:      General: Bowel sounds are normal.      Palpations: Abdomen is soft.   Musculoskeletal:         General: Normal range of motion.   Skin:     General: Skin is warm.   Neurological:      General: No focal deficit present.      Mental Status: She is alert and oriented to person, place, and time. Mental status is at baseline.   Psychiatric:         Mood and  Affect: Mood normal.         Behavior: Behavior normal.          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 Pt has had complications with past outpatient exchanges including SVT following line placement requiring admission and hypoxia/lethargy following exchange (found to have new pHTN). Patient is now admitted for observation following apheresis line placement and RBCEx. Plan for apheresis line placement 12/7 and routine RBC exchange 12/8.      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  - Managed through routine RBC exchanges q4-6 weeks, most recent RBCEx 10/27/23  - OARRS reviewed, no aberrant behavior noted  - new care plan from 12/6/23- For mild to moderate pain: Dilaudid 0.5mg IVP q3h as needed, for moderate to severe pain Dilaudid 1- 1.5mg q3h PRN  - not currently in pain crisis, admitted for observation following line/red cell exchange  - 12/7 start home Oxycodone 10mg q4hrs PRN mod pain + IVP dilaudid 1.5mg q3h PRN   - Bowel regimen for opioid-induced constipation   - Continue home Duloxetine 20mg daily, PO Zofran PRN, Folic Acid 1mg daily, and MVI daily  - Plan for apheresis line placement 12/7 and routine RBCEx 12/8  - Exchange labs 12/6- HgbS 28.3%      # recently diagnosed pHTN   - 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 Yi and Dr. 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 On lifelong anticoagulation, DOAC discouraged due to high risk of clotting.   - Continue home Coumadin 5mg daily   - Caution with fluids  - Follows with Coumadin clinic outpatient     # CHARLES on CKDII  - Baseline ~ SCr <2. Cr 1.37 on admit  - Follows with nephrology otpt     # CAN  - Continue home CPAP   - Continue home Albuterol PRN     # H/O Cauda Equine syndrome  - Follows Dr. Delacruz as outpatient      # DISPO:  - Full Code, confirmed on admit  - DC home with resumed O2 pending monitoring post red cell exchange   - FUV with sickle cell clinic on discharge        I spent > 60 minutes in the professional and overall care of this patient.      Vero Razo, APRN-CNP

## 2023-12-08 ENCOUNTER — APPOINTMENT (OUTPATIENT)
Dept: OTHER | Facility: HOSPITAL | Age: 53
DRG: 812 | End: 2023-12-08
Payer: COMMERCIAL

## 2023-12-08 LAB
ALBUMIN SERPL BCP-MCNC: 3.3 G/DL (ref 3.4–5)
ALBUMIN SERPL BCP-MCNC: 3.5 G/DL (ref 3.4–5)
ALBUMIN SERPL BCP-MCNC: 3.8 G/DL (ref 3.4–5)
ALP SERPL-CCNC: 103 U/L (ref 33–110)
ALP SERPL-CCNC: 87 U/L (ref 33–110)
ALP SERPL-CCNC: 93 U/L (ref 33–110)
ALT SERPL W P-5'-P-CCNC: 5 U/L (ref 7–45)
ALT SERPL W P-5'-P-CCNC: 6 U/L (ref 7–45)
ALT SERPL W P-5'-P-CCNC: 6 U/L (ref 7–45)
ANION GAP SERPL CALC-SCNC: 11 MMOL/L (ref 10–20)
ANION GAP SERPL CALC-SCNC: 14 MMOL/L (ref 10–20)
ANION GAP SERPL CALC-SCNC: 14 MMOL/L (ref 10–20)
AST SERPL W P-5'-P-CCNC: 11 U/L (ref 9–39)
AST SERPL W P-5'-P-CCNC: 12 U/L (ref 9–39)
AST SERPL W P-5'-P-CCNC: 14 U/L (ref 9–39)
BILIRUB SERPL-MCNC: 1.5 MG/DL (ref 0–1.2)
BILIRUB SERPL-MCNC: 2.2 MG/DL (ref 0–1.2)
BILIRUB SERPL-MCNC: 2.6 MG/DL (ref 0–1.2)
BLOOD EXPIRATION DATE: NORMAL
BUN SERPL-MCNC: 23 MG/DL (ref 6–23)
BUN SERPL-MCNC: 28 MG/DL (ref 6–23)
BUN SERPL-MCNC: 33 MG/DL (ref 6–23)
CA-I BLD-SCNC: 1.05 MMOL/L (ref 1.1–1.33)
CALCIUM SERPL-MCNC: 8.2 MG/DL (ref 8.6–10.6)
CALCIUM SERPL-MCNC: 8.7 MG/DL (ref 8.6–10.6)
CALCIUM SERPL-MCNC: 9.3 MG/DL (ref 8.6–10.6)
CHLORIDE SERPL-SCNC: 101 MMOL/L (ref 98–107)
CHLORIDE SERPL-SCNC: 102 MMOL/L (ref 98–107)
CHLORIDE SERPL-SCNC: 103 MMOL/L (ref 98–107)
CO2 SERPL-SCNC: 27 MMOL/L (ref 21–32)
CO2 SERPL-SCNC: 27 MMOL/L (ref 21–32)
CO2 SERPL-SCNC: 28 MMOL/L (ref 21–32)
CREAT SERPL-MCNC: 2.45 MG/DL (ref 0.5–1.05)
CREAT SERPL-MCNC: 3.1 MG/DL (ref 0.5–1.05)
CREAT SERPL-MCNC: 3.66 MG/DL (ref 0.5–1.05)
DISPENSE STATUS: NORMAL
ERYTHROCYTE [DISTWIDTH] IN BLOOD BY AUTOMATED COUNT: 24.3 % (ref 11.5–14.5)
ERYTHROCYTE [DISTWIDTH] IN BLOOD BY AUTOMATED COUNT: 27.4 % (ref 11.5–14.5)
GFR SERPL CREATININE-BSD FRML MDRD: 14 ML/MIN/1.73M*2
GFR SERPL CREATININE-BSD FRML MDRD: 17 ML/MIN/1.73M*2
GFR SERPL CREATININE-BSD FRML MDRD: 23 ML/MIN/1.73M*2
GLUCOSE BLD MANUAL STRIP-MCNC: 111 MG/DL (ref 74–99)
GLUCOSE BLD MANUAL STRIP-MCNC: 199 MG/DL (ref 74–99)
GLUCOSE BLD MANUAL STRIP-MCNC: 60 MG/DL (ref 74–99)
GLUCOSE BLD MANUAL STRIP-MCNC: 94 MG/DL (ref 74–99)
GLUCOSE SERPL-MCNC: 101 MG/DL (ref 74–99)
GLUCOSE SERPL-MCNC: 84 MG/DL (ref 74–99)
GLUCOSE SERPL-MCNC: 91 MG/DL (ref 74–99)
HCT VFR BLD AUTO: 31.6 % (ref 36–46)
HCT VFR BLD AUTO: 31.9 % (ref 36–46)
HGB BLD-MCNC: 10.1 G/DL (ref 12–16)
HGB BLD-MCNC: 10.1 G/DL (ref 12–16)
HGB RETIC QN: 21 PG (ref 28–38)
IMMATURE RETIC FRACTION: 21.9 %
LDH SERPL L TO P-CCNC: 181 U/L (ref 84–246)
MCH RBC QN AUTO: 22.9 PG (ref 26–34)
MCH RBC QN AUTO: 25 PG (ref 26–34)
MCHC RBC AUTO-ENTMCNC: 31.7 G/DL (ref 32–36)
MCHC RBC AUTO-ENTMCNC: 32 G/DL (ref 32–36)
MCV RBC AUTO: 72 FL (ref 80–100)
MCV RBC AUTO: 79 FL (ref 80–100)
NRBC BLD-RTO: 0 /100 WBCS (ref 0–0)
NRBC BLD-RTO: 4.2 /100 WBCS (ref 0–0)
PLATELET # BLD AUTO: 147 X10*3/UL (ref 150–450)
PLATELET # BLD AUTO: 239 X10*3/UL (ref 150–450)
POTASSIUM SERPL-SCNC: 5.9 MMOL/L (ref 3.5–5.3)
POTASSIUM SERPL-SCNC: 5.9 MMOL/L (ref 3.5–5.3)
POTASSIUM SERPL-SCNC: 6.8 MMOL/L (ref 3.5–5.3)
PRODUCT BLOOD TYPE: 1700
PRODUCT CODE: NORMAL
PROT SERPL-MCNC: 5.9 G/DL (ref 6.4–8.2)
PROT SERPL-MCNC: 6.1 G/DL (ref 6.4–8.2)
PROT SERPL-MCNC: 6.7 G/DL (ref 6.4–8.2)
RBC # BLD AUTO: 4.04 X10*6/UL (ref 4–5.2)
RBC # BLD AUTO: 4.42 X10*6/UL (ref 4–5.2)
RETICS #: 0.15 X10*6/UL (ref 0.02–0.08)
RETICS/RBC NFR AUTO: 3.4 % (ref 0.5–2)
SODIUM SERPL-SCNC: 135 MMOL/L (ref 136–145)
SODIUM SERPL-SCNC: 136 MMOL/L (ref 136–145)
SODIUM SERPL-SCNC: 137 MMOL/L (ref 136–145)
UNIT ABO: NORMAL
UNIT NUMBER: NORMAL
UNIT RH: NORMAL
UNIT VOLUME: 350
WBC # BLD AUTO: 10.9 X10*3/UL (ref 4.4–11.3)
WBC # BLD AUTO: 8.6 X10*3/UL (ref 4.4–11.3)
XM INTEP: NORMAL

## 2023-12-08 PROCEDURE — 2500000002 HC RX 250 W HCPCS SELF ADMINISTERED DRUGS (ALT 637 FOR MEDICARE OP, ALT 636 FOR OP/ED)

## 2023-12-08 PROCEDURE — 99232 SBSQ HOSP IP/OBS MODERATE 35: CPT | Performed by: INTERNAL MEDICINE

## 2023-12-08 PROCEDURE — 2500000004 HC RX 250 GENERAL PHARMACY W/ HCPCS (ALT 636 FOR OP/ED)

## 2023-12-08 PROCEDURE — 84132 ASSAY OF SERUM POTASSIUM: CPT

## 2023-12-08 PROCEDURE — 2500000004 HC RX 250 GENERAL PHARMACY W/ HCPCS (ALT 636 FOR OP/ED): Performed by: NURSE PRACTITIONER

## 2023-12-08 PROCEDURE — 2500000005 HC RX 250 GENERAL PHARMACY W/O HCPCS

## 2023-12-08 PROCEDURE — 86902 BLOOD TYPE ANTIGEN DONOR EA: CPT

## 2023-12-08 PROCEDURE — P9016 RBC LEUKOCYTES REDUCED: HCPCS

## 2023-12-08 PROCEDURE — 82947 ASSAY GLUCOSE BLOOD QUANT: CPT

## 2023-12-08 PROCEDURE — 85027 COMPLETE CBC AUTOMATED: CPT

## 2023-12-08 PROCEDURE — 85660 RBC SICKLE CELL TEST: CPT

## 2023-12-08 PROCEDURE — 83020 HEMOGLOBIN ELECTROPHORESIS: CPT

## 2023-12-08 PROCEDURE — 93010 ELECTROCARDIOGRAM REPORT: CPT | Performed by: INTERNAL MEDICINE

## 2023-12-08 PROCEDURE — 83021 HEMOGLOBIN CHROMOTOGRAPHY: CPT

## 2023-12-08 PROCEDURE — 6A550Z0 PHERESIS OF ERYTHROCYTES, SINGLE: ICD-10-PCS | Performed by: STUDENT IN AN ORGANIZED HEALTH CARE EDUCATION/TRAINING PROGRAM

## 2023-12-08 PROCEDURE — 36512 APHERESIS RBC: CPT

## 2023-12-08 PROCEDURE — 2500000001 HC RX 250 WO HCPCS SELF ADMINISTERED DRUGS (ALT 637 FOR MEDICARE OP): Performed by: NURSE PRACTITIONER

## 2023-12-08 PROCEDURE — 85045 AUTOMATED RETICULOCYTE COUNT: CPT | Performed by: NURSE PRACTITIONER

## 2023-12-08 PROCEDURE — 80053 COMPREHEN METABOLIC PANEL: CPT | Performed by: NURSE PRACTITIONER

## 2023-12-08 PROCEDURE — 83615 LACTATE (LD) (LDH) ENZYME: CPT | Performed by: NURSE PRACTITIONER

## 2023-12-08 PROCEDURE — G0378 HOSPITAL OBSERVATION PER HR: HCPCS

## 2023-12-08 PROCEDURE — 85027 COMPLETE CBC AUTOMATED: CPT | Performed by: NURSE PRACTITIONER

## 2023-12-08 PROCEDURE — 2500000001 HC RX 250 WO HCPCS SELF ADMINISTERED DRUGS (ALT 637 FOR MEDICARE OP)

## 2023-12-08 PROCEDURE — 82330 ASSAY OF CALCIUM: CPT

## 2023-12-08 RX ORDER — ACETAMINOPHEN 325 MG/1
650 TABLET ORAL ONCE
Status: DISCONTINUED | OUTPATIENT
Start: 2023-12-08 | End: 2023-12-08 | Stop reason: HOSPADM

## 2023-12-08 RX ORDER — CALCIUM CARBONATE 200(500)MG
1500 TABLET,CHEWABLE ORAL EVERY 5 MIN PRN
Status: DISCONTINUED | OUTPATIENT
Start: 2023-12-08 | End: 2023-12-08 | Stop reason: HOSPADM

## 2023-12-08 RX ORDER — DIPHENHYDRAMINE HYDROCHLORIDE 50 MG/ML
25 INJECTION INTRAMUSCULAR; INTRAVENOUS EVERY 5 MIN PRN
Status: DISCONTINUED | OUTPATIENT
Start: 2023-12-08 | End: 2023-12-08 | Stop reason: HOSPADM

## 2023-12-08 RX ORDER — DEXTROSE MONOHYDRATE 100 MG/ML
50 INJECTION, SOLUTION INTRAVENOUS CONTINUOUS
Status: DISCONTINUED | OUTPATIENT
Start: 2023-12-08 | End: 2023-12-08

## 2023-12-08 RX ORDER — DEXTROSE MONOHYDRATE 100 MG/ML
50 INJECTION, SOLUTION INTRAVENOUS CONTINUOUS
Status: ACTIVE | OUTPATIENT
Start: 2023-12-08 | End: 2023-12-08

## 2023-12-08 RX ORDER — DEXTROSE 50 % IN WATER (D50W) INTRAVENOUS SYRINGE
25 ONCE
Status: COMPLETED | OUTPATIENT
Start: 2023-12-08 | End: 2023-12-08

## 2023-12-08 RX ORDER — HEPARIN SODIUM 1000 [USP'U]/ML
1000 INJECTION, SOLUTION INTRAVENOUS; SUBCUTANEOUS ONCE
Status: COMPLETED | OUTPATIENT
Start: 2023-12-08 | End: 2023-12-08

## 2023-12-08 RX ORDER — DIPHENHYDRAMINE HCL 25 MG
25 CAPSULE ORAL ONCE
Status: COMPLETED | OUTPATIENT
Start: 2023-12-08 | End: 2023-12-08

## 2023-12-08 RX ADMIN — SODIUM CHLORIDE 500 ML: 9 INJECTION, SOLUTION INTRAVENOUS at 09:35

## 2023-12-08 RX ADMIN — DEXTROSE MONOHYDRATE 50 ML/HR: 100 INJECTION, SOLUTION INTRAVENOUS at 17:05

## 2023-12-08 RX ADMIN — HYDROMORPHONE HYDROCHLORIDE 1.5 MG: 1 INJECTION, SOLUTION INTRAMUSCULAR; INTRAVENOUS; SUBCUTANEOUS at 01:56

## 2023-12-08 RX ADMIN — DULOXETINE HYDROCHLORIDE 40 MG: 20 CAPSULE, DELAYED RELEASE ORAL at 08:27

## 2023-12-08 RX ADMIN — SODIUM CHLORIDE 500 ML: 9 INJECTION, SOLUTION INTRAVENOUS at 15:12

## 2023-12-08 RX ADMIN — FUROSEMIDE 20 MG: 20 TABLET ORAL at 08:27

## 2023-12-08 RX ADMIN — HYDROMORPHONE HYDROCHLORIDE 1.5 MG: 1 INJECTION, SOLUTION INTRAMUSCULAR; INTRAVENOUS; SUBCUTANEOUS at 04:25

## 2023-12-08 RX ADMIN — Medication 1 TABLET: at 08:27

## 2023-12-08 RX ADMIN — HYDROMORPHONE HYDROCHLORIDE 1.5 MG: 1 INJECTION, SOLUTION INTRAMUSCULAR; INTRAVENOUS; SUBCUTANEOUS at 15:00

## 2023-12-08 RX ADMIN — SODIUM ZIRCONIUM CYCLOSILICATE 10 G: 10 POWDER, FOR SUSPENSION ORAL at 15:46

## 2023-12-08 RX ADMIN — WARFARIN SODIUM 5 MG: 5 TABLET ORAL at 17:55

## 2023-12-08 RX ADMIN — HEPARIN SODIUM 1000 UNITS: 1000 INJECTION INTRAVENOUS; SUBCUTANEOUS at 11:34

## 2023-12-08 RX ADMIN — OXYCODONE HYDROCHLORIDE 10 MG: 5 TABLET ORAL at 06:31

## 2023-12-08 RX ADMIN — OXYCODONE HYDROCHLORIDE 10 MG: 5 TABLET ORAL at 02:19

## 2023-12-08 RX ADMIN — METOPROLOL TARTRATE 25 MG: 25 TABLET, FILM COATED ORAL at 08:27

## 2023-12-08 RX ADMIN — FOLIC ACID 1 MG: 1 TABLET ORAL at 08:27

## 2023-12-08 RX ADMIN — SODIUM ZIRCONIUM CYCLOSILICATE 10 G: 10 POWDER, FOR SUSPENSION ORAL at 21:56

## 2023-12-08 RX ADMIN — HYDROMORPHONE HYDROCHLORIDE 1.5 MG: 1 INJECTION, SOLUTION INTRAMUSCULAR; INTRAVENOUS; SUBCUTANEOUS at 08:26

## 2023-12-08 RX ADMIN — SODIUM CHLORIDE 500 ML: 9 INJECTION, SOLUTION INTRAVENOUS at 21:56

## 2023-12-08 RX ADMIN — CALCIUM GLUCONATE 25 ML/HR: 20 INJECTION, SOLUTION INTRAVENOUS at 09:15

## 2023-12-08 RX ADMIN — HYDROMORPHONE HYDROCHLORIDE 1.5 MG: 1 INJECTION, SOLUTION INTRAMUSCULAR; INTRAVENOUS; SUBCUTANEOUS at 19:29

## 2023-12-08 RX ADMIN — DEXTROSE MONOHYDRATE 25 G: 25 INJECTION, SOLUTION INTRAVENOUS at 15:00

## 2023-12-08 RX ADMIN — HYDROMORPHONE HYDROCHLORIDE 1.5 MG: 1 INJECTION, SOLUTION INTRAMUSCULAR; INTRAVENOUS; SUBCUTANEOUS at 11:34

## 2023-12-08 RX ADMIN — DIPHENHYDRAMINE HYDROCHLORIDE 25 MG: 25 CAPSULE ORAL at 08:48

## 2023-12-08 RX ADMIN — INSULIN HUMAN 10 UNITS: 100 INJECTION, SOLUTION PARENTERAL at 14:59

## 2023-12-08 ASSESSMENT — COGNITIVE AND FUNCTIONAL STATUS - GENERAL
DAILY ACTIVITIY SCORE: 24
MOBILITY SCORE: 24
DAILY ACTIVITIY SCORE: 24
MOBILITY SCORE: 24

## 2023-12-08 ASSESSMENT — PAIN SCALES - GENERAL
PAINLEVEL_OUTOF10: 10 - WORST POSSIBLE PAIN
PAINLEVEL_OUTOF10: 9
PAINLEVEL_OUTOF10: 9
PAINLEVEL_OUTOF10: 7
PAINLEVEL_OUTOF10: 8
PAINLEVEL_OUTOF10: 10 - WORST POSSIBLE PAIN
PAINLEVEL_OUTOF10: 10 - WORST POSSIBLE PAIN
PAINLEVEL_OUTOF10: 9
PAINLEVEL_OUTOF10: 8
PAINLEVEL_OUTOF10: 8
PAINLEVEL_OUTOF10: 9
PAINLEVEL_OUTOF10: 8

## 2023-12-08 ASSESSMENT — PAIN - FUNCTIONAL ASSESSMENT
PAIN_FUNCTIONAL_ASSESSMENT: 0-10

## 2023-12-08 NOTE — CARE PLAN
Problem: Fall/Injury  Goal: Not fall by end of shift  Outcome: Progressing  Goal: Be free from injury by end of the shift  Outcome: Progressing  Goal: Verbalize understanding of personal risk factors for fall in the hospital  Outcome: Progressing  Goal: Verbalize understanding of risk factor reduction measures to prevent injury from fall in the home  Outcome: Progressing  Goal: Use assistive devices by end of the shift  Outcome: Progressing  Goal: Pace activities to prevent fatigue by end of the shift  Outcome: Progressing     Problem: Pain  Goal: Takes deep breaths with improved pain control throughout the shift  Outcome: Progressing  Goal: Turns in bed with improved pain control throughout the shift  Outcome: Progressing  Goal: Walks with improved pain control throughout the shift  Outcome: Progressing  Goal: Performs ADL's with improved pain control throughout shift  Outcome: Progressing  Goal: Participates in PT with improved pain control throughout the shift  Outcome: Progressing  Goal: Free from opioid side effects throughout the shift  Outcome: Progressing  Goal: Free from acute confusion related to pain meds throughout the shift  Outcome: Progressing

## 2023-12-08 NOTE — NURSING NOTE
Apheresis Nursing Note    Seniat Narvaez is a 53 y.o. female presenting for RBCEX as part of Chronic Exchange Program.    Pre-Procedure Assessment  Pre-Procedure Assessment  Level of Consciousness: Responds to voice  Orientation Level: Oriented X4, Appropriate for developmental age  Cognition: Follows commands  Pain Score: 9  Pain Type: Chronic pain  Acceptable Comfort Level (Numeric): 5  Pain Interventions:  (pain being managed by [facility] 4 team)  Access Sites 'Okay to Use' Obtained: Yes  Access Site(s) Assessment: Yes  Estimated Replacement Volume: 1900  Vital Signs  Temp: 36.3 °C (97.3 °F)  Heart Rate: 70  Resp: 18  BP: 93/69  Medical Gas Therapy  SpO2: 94%  Pulse Oximetry Type: Intermittent  Oximetry Probe Site Location: Finger  Patient Activity During SpO2 Measurement: At rest  Medical Gas Therapy: Supplemental oxygen (4L)  O2 Delivery Method: Nasal cannula  Procedure Information and Time Out  Procedure Type: Red blood cell exchange  Red Cell Diagnosis: Other (see comment) (Chronic Exchange program)  Target Hemoglobin S (HgB S) (g/dL): 28 g/dL (patient has SC disease. Value above is target SC)  Target Hematocrit (HCT) %: 28 %  Target Fraction of Cells Remaining (FCR): 51  Units Used: 6  Volume Processed (L): Other (see comment) (n/a RBCEX)  Fluid Balance: 100%  RBC Exchange Time-Out Completed: Yes  Provider Time Out Completed with: Dr.Yi Gerald Baer  Time Out Completed on: 12/08/23  Time Out Completed at: 0910  Equipment and Disposables  Apheresis Machine: Spectra Optia [id number]  Apheresis Machine PM in Date: Yes  Blood Warmer: Astotherm DNA [id number]  Blood Warmer PM in Date: Yes  Kit and Blood/Fluid Warmer Inspection: Tubing inspected with machine prime  Kit Type: Exchange kits  Kit Lot Number: [id number]  Kit Expiration Date: 09/01/25  ADC-A Used as Anticoagulant: Yes  ACD-A Lot #: [id number]  ACD-A Expiration Date: 08/01/25  9% Sodium Chloride Lot # (Liter): [id number]  9% Sodium Chloride Expiration Date (Liter):  03/31/25  Procedure Start  Start Time: 0917    Post-Procedure Assessment:  Post-Procedure  End Time: 1130  Waste Materials Collected for Research: No  Procedure Outcome: Treatment completed successfully  Adverse Event: Yes (see comment) (Patient hypotensive after first unit of exchange. 500cc bolus administered and RBCEX able to be completed. Both transfusion medicine and [place] team aware of BPs.)  Post-Procedure Line Assessment: Patent  Post-Procedure Access Care : Caps changed, 'Do Not Flush' label applied, Loaded/flushed per order  Vital Signs  Temp: 36.3 °C (97.3 °F)  Heart Rate: 69  Resp: 18  BP: 92/59  Medical Gas Therapy  SpO2: 99 %  Pulse Oximetry Type: Intermittent  Oximetry Probe Site Location: Finger  Patient Activity During SpO2 Measurement: At rest  Medical Gas Therapy: Supplemental oxygen (4L)  O2 Delivery Method: Nasal cannula  Post-Procedure Patient Fluid Values   Replacement Fluid Intake (mL): 1817 mL  AC Infused (mL): 224 mL  Rinseback Intake (mL): 0 mL  Ca+ Solution Intake (mL): 36 mL  Other Intake (mL): 0 mL  Apheresis Intake Total (mL): 2077 mL  Removed During Apheresis Output (mL): 2152 mL  AC in Bag Output (mL): 106 mL  Samples Output (mL): 20 mL  Apheresis Output Total (mL): 2066 mL  Inlet Volume Processed (mL): 4345 mL  Net Difference (mL): 11 mL  Disposition  Patient's Condition at End of Procedure: Stable  Disposition: N/A - procedure performed at bedside  Report Given to: Floor Nurse  $ Apheresis Charge: Red blood cell exchange    Lizbeth Babb RN

## 2023-12-08 NOTE — POST-PROCEDURE NOTE
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     I saw and evaluated the patient during the RCE.  The patient was resting in bed.  Vascular access functioned well.    WBC   Date/Time Value Ref Range Status   12/08/2023 04:32 AM 10.9 4.4 - 11.3 x10*3/uL Final     Hemoglobin   Date/Time Value Ref Range Status   12/08/2023 04:32 AM 10.1 (L) 12.0 - 16.0 g/dL Final     Hematocrit   Date/Time Value Ref Range Status   12/08/2023 04:32 AM 31.6 (L) 36.0 - 46.0 % Final     Platelets   Date/Time Value Ref Range Status   12/08/2023 04:32  150 - 450 x10*3/uL Final        POCT Calcium, Ionized   Date/Time Value Ref Range Status   12/06/2023 10:34 AM 1.24 1.1 - 1.33 mmol/L Final     Comment:     The performance characteristics of ionized calcium tested  in heparinized plasma or serum have been validated by the  individual  laboratory site where testing is performed.   Testing on heparinized plasma or serum is not approved by   the FDA; however, such approval is not necessary.        Hemoglobin S   Date/Time Value Ref Range Status   12/06/2023 10:34 AM 28.3 (H) <=0.0 % Final        Ferritin   Date/Time Value Ref Range Status   12/06/2023 10:34 AM 14 8 - 150 ng/mL Final        Pre-procedure vital signs at 08:57  T: 36.3 C, HR: 70, RR: 18, BP: 93/69  Pulse oximetry: 94% on 4 L nasal cannula    Post-procedure vital signs at 11:30  T: 36.3 C, HR: 69, RR: 18, BP: 92/59    Outcome: RCE completed. The patient's blood pressure (BP) dropped to 77/46 at the completion of the 2/6 units of RBC.  The patient was evaluated and endorsed no complaints.  The procedure was paused and the primary team was contacted.  A recheck showed a BP of 99/46.  The procedure was restarted and the patient's BP was at 82/41 at 5/6 units, again with no complaints.  The procedure was continued with  more frequent monitoring of the BP, and at the next check was at 89/57. The patient had no complaints at the end of the procedure.     Assessment and Plan:  53 y.o. female with Sickle Cell Disease who underwent RCE as part of the chronic exchange program.    Draw post-procedure labs  Vascular access to be managed by the clinical team.  Next procedure to be scheduled by the apheresis center in coordination with primary outpatient hematology team.  Tentative next RCE in 4-6 weeks.

## 2023-12-08 NOTE — PROGRESS NOTES
Senait Marshall is a 53 y.o. female on day 0 of admission presenting with Sickle cell disease with crisis and other complication (CMS/HCC).    Subjective   Patient seen at bedside. Currently receiving her routine exchange transfusion. States that she is experiencing some generalized pain. Rates pain 6/10. Denies any new symptoms. Denies SOB, CP, heart palpitations, N/V/D/C, fever/chills, headache dizziness or vision changes        Objective     Physical Exam  Constitutional:       Appearance: Normal appearance.   HENT:      Head: Normocephalic.      Mouth/Throat:      Mouth: Mucous membranes are moist.   Eyes:      Pupils: Pupils are equal, round, and reactive to light.   Cardiovascular:      Rate and Rhythm: Normal rate.   Pulmonary:      Effort: Pulmonary effort is normal.   Abdominal:      Palpations: Abdomen is soft.   Musculoskeletal:         General: Normal range of motion.      Cervical back: Normal range of motion.   Skin:     General: Skin is warm.   Neurological:      General: No focal deficit present.      Mental Status: She is alert.   Psychiatric:         Mood and Affect: Mood normal.         Behavior: Behavior normal.         Last Recorded Vitals  Blood pressure 92/59, pulse 69, temperature 36.3 °C (97.3 °F), temperature source Temporal, resp. rate 18, SpO2 94 %.  Intake/Output last 3 Shifts:  No intake/output data recorded.    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 Pt has had complications with past  outpatient exchanges including SVT following line placement requiring admission and hypoxia/lethargy following exchange (found to have new pHTN). Patient is now admitted for observation following apheresis line placement and RBCEx. Apheresis line placement 12/7 and routine RBC exchange 12/8.      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  - Managed through routine RBC exchanges q4-6 weeks, most recent RBCEx 10/27/23  - OARRS reviewed, no aberrant behavior noted  - new care plan from 12/6/23- For mild to moderate pain: Dilaudid 0.5mg IVP q3h as needed, for moderate to severe pain Dilaudid 1- 1.5mg q3h PRN  - not currently in pain crisis, admitted for observation following line/red cell exchange  - 12/7 start home Oxycodone 10mg q4hrs PRN mod pain + IVP dilaudid 1.5mg q3h PRN   - Bowel regimen for opioid-induced constipation   - Continue home Duloxetine 20mg daily, PO Zofran PRN, Folic Acid 1mg daily, and MVI daily  - Plan for apheresis line placement 12/7 and routine RBCEx 12/8  - Exchange labs 12/6- HgbS 28.3%      # recently diagnosed pHTN   - 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 Yi and Dr. 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 On lifelong anticoagulation, DOAC discouraged due to high risk of clotting.   - Continue home Coumadin 5mg daily   - Caution with fluids  - Follows with Coumadin clinic outpatient     # CHARLES on CKDII  - Baseline ~ SCr <2. Cr 1.37 on admit  - Follows with nephrology otpt     # CAN  - Continue home CPAP   - Continue home Albuterol PRN     # H/O Cauda Equine syndrome  - Follows Dr. Delacruz as outpatient      # DISPO:  - Full Code, confirmed on admit  - DC home with resumed O2 pending monitoring post red cell exchange   - FUV with sickle cell clinic on discharge          I spent 60 minutes in the professional and overall care of this patient.      GRISEL Gomes-CNP    Patient discussed with Dr. Cr

## 2023-12-08 NOTE — CARE PLAN
The patient's goals for the shift include      The clinical goals for the shift include Pt will have decreased pain through end of shift 12/8/2023 1900      Problem: Fall/Injury  Goal: Not fall by end of shift  Outcome: Progressing  Goal: Be free from injury by end of the shift  Outcome: Progressing  Goal: Verbalize understanding of personal risk factors for fall in the hospital  Outcome: Progressing  Goal: Verbalize understanding of risk factor reduction measures to prevent injury from fall in the home  Outcome: Progressing  Goal: Use assistive devices by end of the shift  Outcome: Progressing  Goal: Pace activities to prevent fatigue by end of the shift  Outcome: Progressing     Problem: Pain  Goal: Takes deep breaths with improved pain control throughout the shift  Outcome: Progressing  Goal: Turns in bed with improved pain control throughout the shift  Outcome: Progressing  Goal: Walks with improved pain control throughout the shift  Outcome: Progressing  Goal: Performs ADL's with improved pain control throughout shift  Outcome: Progressing  Goal: Participates in PT with improved pain control throughout the shift  Outcome: Progressing  Goal: Free from opioid side effects throughout the shift  Outcome: Progressing  Goal: Free from acute confusion related to pain meds throughout the shift  Outcome: Progressing

## 2023-12-08 NOTE — SIGNIFICANT EVENT
12/08/23 1746   Onset Documentation   Rapid Response Initiated By Radar auto page   [place] [facility] [place] [phone]   Arrival Time 1738   Event End Time 1743   Level II Called No   Primary Reason for Call Radar auto page     Rapid Response Note    Radar auto-page received for a radar score of 6 with the following vital signs: 36.3, 70, 18, 94/53, 93%.  Vital signs were reviewed with primary RN who has no concerns at this time. RN will contact Rapid Response Team with future concerns or clinical deterioration.

## 2023-12-08 NOTE — PROGRESS NOTES
Senait Narvaez is a 53 y.o. female on day 0 of admission presenting with Sickle cell disease with crisis and other complication (CMS/HCC).    TCC Note    Plan per Medical/Surgical Team: exchange on 12/8, pain control  Status: Inpatient  Payor Source: [facility]  Discharge disposition: home with resumed 2L nocturnal O2/CPAP  Expected date of discharge: 12/10/23  Barriers: none  Primary Oncologist:  Preferred home care agency:    Demographics and insurance verifed with patient. Will continue to follow patient for any discharge needs.     JERMAINE LIM RN TCC

## 2023-12-09 LAB
ALBUMIN SERPL BCP-MCNC: 3.2 G/DL (ref 3.4–5)
ALP SERPL-CCNC: 88 U/L (ref 33–110)
ALT SERPL W P-5'-P-CCNC: 6 U/L (ref 7–45)
ANION GAP SERPL CALC-SCNC: 11 MMOL/L (ref 10–20)
AST SERPL W P-5'-P-CCNC: 9 U/L (ref 9–39)
BASOPHILS # BLD AUTO: 0.02 X10*3/UL (ref 0–0.1)
BASOPHILS NFR BLD AUTO: 0.2 %
BILIRUB SERPL-MCNC: 1.3 MG/DL (ref 0–1.2)
BUN SERPL-MCNC: 33 MG/DL (ref 6–23)
CALCIUM SERPL-MCNC: 8.2 MG/DL (ref 8.6–10.6)
CHLORIDE SERPL-SCNC: 104 MMOL/L (ref 98–107)
CHLORIDE UR-SCNC: 86 MMOL/L
CHLORIDE/CREATININE (MMOL/G) IN URINE: 97 MMOL/G CREAT (ref 38–318)
CO2 SERPL-SCNC: 26 MMOL/L (ref 21–32)
CREAT SERPL-MCNC: 3.42 MG/DL (ref 0.5–1.05)
CREAT UR-MCNC: 88.6 MG/DL (ref 20–320)
EOSINOPHIL # BLD AUTO: 0.28 X10*3/UL (ref 0–0.7)
EOSINOPHIL NFR BLD AUTO: 2.3 %
ERYTHROCYTE [DISTWIDTH] IN BLOOD BY AUTOMATED COUNT: 24.8 % (ref 11.5–14.5)
GFR SERPL CREATININE-BSD FRML MDRD: 15 ML/MIN/1.73M*2
GLUCOSE SERPL-MCNC: 92 MG/DL (ref 74–99)
HCT VFR BLD AUTO: 27.1 % (ref 36–46)
HGB BLD-MCNC: 8.8 G/DL (ref 12–16)
HGB RETIC QN: 23 PG (ref 28–38)
HYPOCHROMIA BLD QL SMEAR: NORMAL
IMM GRANULOCYTES # BLD AUTO: 0.05 X10*3/UL (ref 0–0.7)
IMM GRANULOCYTES NFR BLD AUTO: 0.4 % (ref 0–0.9)
IMMATURE RETIC FRACTION: 21.2 %
LDH SERPL L TO P-CCNC: 180 U/L (ref 84–246)
LYMPHOCYTES # BLD AUTO: 1.61 X10*3/UL (ref 1.2–4.8)
LYMPHOCYTES NFR BLD AUTO: 13.1 %
MCH RBC QN AUTO: 25.4 PG (ref 26–34)
MCHC RBC AUTO-ENTMCNC: 32.5 G/DL (ref 32–36)
MCV RBC AUTO: 78 FL (ref 80–100)
MONOCYTES # BLD AUTO: 1 X10*3/UL (ref 0.1–1)
MONOCYTES NFR BLD AUTO: 8.1 %
NEUTROPHILS # BLD AUTO: 9.31 X10*3/UL (ref 1.2–7.7)
NEUTROPHILS NFR BLD AUTO: 75.9 %
NRBC BLD-RTO: 0.2 /100 WBCS (ref 0–0)
PLATELET # BLD AUTO: 150 X10*3/UL (ref 150–450)
POLYCHROMASIA BLD QL SMEAR: NORMAL
POTASSIUM SERPL-SCNC: 5.3 MMOL/L (ref 3.5–5.3)
POTASSIUM UR-SCNC: 28 MMOL/L
POTASSIUM/CREAT UR-RTO: 32 MMOL/G CREAT
PROT SERPL-MCNC: 5.6 G/DL (ref 6.4–8.2)
RBC # BLD AUTO: 3.46 X10*6/UL (ref 4–5.2)
RBC MORPH BLD: NORMAL
RETICS #: 0.16 X10*6/UL (ref 0.02–0.08)
RETICS/RBC NFR AUTO: 4.7 % (ref 0.5–2)
SODIUM SERPL-SCNC: 136 MMOL/L (ref 136–145)
SODIUM UR-SCNC: 63 MMOL/L
SODIUM/CREAT UR-RTO: 71 MMOL/G CREAT
TARGETS BLD QL SMEAR: NORMAL
WBC # BLD AUTO: 12.3 X10*3/UL (ref 4.4–11.3)

## 2023-12-09 PROCEDURE — 1170000001 HC PRIVATE ONCOLOGY ROOM DAILY

## 2023-12-09 PROCEDURE — 82436 ASSAY OF URINE CHLORIDE: CPT

## 2023-12-09 PROCEDURE — 83020 HEMOGLOBIN ELECTROPHORESIS: CPT

## 2023-12-09 PROCEDURE — 83615 LACTATE (LD) (LDH) ENZYME: CPT

## 2023-12-09 PROCEDURE — 2500000001 HC RX 250 WO HCPCS SELF ADMINISTERED DRUGS (ALT 637 FOR MEDICARE OP): Performed by: NURSE PRACTITIONER

## 2023-12-09 PROCEDURE — 85025 COMPLETE CBC W/AUTO DIFF WBC: CPT

## 2023-12-09 PROCEDURE — 2500000004 HC RX 250 GENERAL PHARMACY W/ HCPCS (ALT 636 FOR OP/ED): Performed by: NURSE PRACTITIONER

## 2023-12-09 PROCEDURE — 2500000004 HC RX 250 GENERAL PHARMACY W/ HCPCS (ALT 636 FOR OP/ED)

## 2023-12-09 PROCEDURE — 80053 COMPREHEN METABOLIC PANEL: CPT

## 2023-12-09 PROCEDURE — 83021 HEMOGLOBIN CHROMOTOGRAPHY: CPT

## 2023-12-09 PROCEDURE — 99233 SBSQ HOSP IP/OBS HIGH 50: CPT

## 2023-12-09 PROCEDURE — 85045 AUTOMATED RETICULOCYTE COUNT: CPT

## 2023-12-09 RX ORDER — CYCLOBENZAPRINE HCL 10 MG
10 TABLET ORAL 3 TIMES DAILY PRN
Status: CANCELLED | OUTPATIENT
Start: 2023-12-09

## 2023-12-09 RX ADMIN — SENNOSIDES AND DOCUSATE SODIUM 2 TABLET: 8.6; 5 TABLET ORAL at 10:02

## 2023-12-09 RX ADMIN — HYDROMORPHONE HYDROCHLORIDE 1.5 MG: 1 INJECTION, SOLUTION INTRAMUSCULAR; INTRAVENOUS; SUBCUTANEOUS at 05:46

## 2023-12-09 RX ADMIN — FOLIC ACID 1 MG: 1 TABLET ORAL at 10:01

## 2023-12-09 RX ADMIN — SODIUM ZIRCONIUM CYCLOSILICATE 10 G: 10 POWDER, FOR SUSPENSION ORAL at 22:48

## 2023-12-09 RX ADMIN — SODIUM ZIRCONIUM CYCLOSILICATE 10 G: 10 POWDER, FOR SUSPENSION ORAL at 14:14

## 2023-12-09 RX ADMIN — SODIUM CHLORIDE 500 ML: 9 INJECTION, SOLUTION INTRAVENOUS at 00:30

## 2023-12-09 RX ADMIN — SODIUM CHLORIDE 500 ML: 9 INJECTION, SOLUTION INTRAVENOUS at 11:34

## 2023-12-09 RX ADMIN — SENNOSIDES AND DOCUSATE SODIUM 2 TABLET: 8.6; 5 TABLET ORAL at 20:28

## 2023-12-09 RX ADMIN — HYDROMORPHONE HYDROCHLORIDE 1.5 MG: 1 INJECTION, SOLUTION INTRAMUSCULAR; INTRAVENOUS; SUBCUTANEOUS at 13:33

## 2023-12-09 RX ADMIN — HYDROMORPHONE HYDROCHLORIDE 1.5 MG: 1 INJECTION, SOLUTION INTRAMUSCULAR; INTRAVENOUS; SUBCUTANEOUS at 22:48

## 2023-12-09 RX ADMIN — WARFARIN SODIUM 5 MG: 5 TABLET ORAL at 17:36

## 2023-12-09 RX ADMIN — HYDROMORPHONE HYDROCHLORIDE 1.5 MG: 1 INJECTION, SOLUTION INTRAMUSCULAR; INTRAVENOUS; SUBCUTANEOUS at 01:27

## 2023-12-09 RX ADMIN — HYDROMORPHONE HYDROCHLORIDE 1.5 MG: 1 INJECTION, SOLUTION INTRAMUSCULAR; INTRAVENOUS; SUBCUTANEOUS at 16:35

## 2023-12-09 RX ADMIN — HYDROMORPHONE HYDROCHLORIDE 1.5 MG: 1 INJECTION, SOLUTION INTRAMUSCULAR; INTRAVENOUS; SUBCUTANEOUS at 10:00

## 2023-12-09 RX ADMIN — Medication 1 TABLET: at 10:01

## 2023-12-09 RX ADMIN — SODIUM ZIRCONIUM CYCLOSILICATE 10 G: 10 POWDER, FOR SUSPENSION ORAL at 05:27

## 2023-12-09 RX ADMIN — DULOXETINE HYDROCHLORIDE 40 MG: 20 CAPSULE, DELAYED RELEASE ORAL at 10:02

## 2023-12-09 RX ADMIN — HYDROMORPHONE HYDROCHLORIDE 1.5 MG: 1 INJECTION, SOLUTION INTRAMUSCULAR; INTRAVENOUS; SUBCUTANEOUS at 19:36

## 2023-12-09 RX ADMIN — METOPROLOL TARTRATE 25 MG: 25 TABLET, FILM COATED ORAL at 20:28

## 2023-12-09 ASSESSMENT — PAIN SCALES - GENERAL
PAINLEVEL_OUTOF10: 8
PAINLEVEL_OUTOF10: 8
PAINLEVEL_OUTOF10: 7
PAINLEVEL_OUTOF10: 8
PAINLEVEL_OUTOF10: 8
PAINLEVEL_OUTOF10: 7
PAINLEVEL_OUTOF10: 8

## 2023-12-09 ASSESSMENT — COGNITIVE AND FUNCTIONAL STATUS - GENERAL
DAILY ACTIVITIY SCORE: 24
MOBILITY SCORE: 24
MOBILITY SCORE: 24
DAILY ACTIVITIY SCORE: 24

## 2023-12-09 ASSESSMENT — PAIN - FUNCTIONAL ASSESSMENT
PAIN_FUNCTIONAL_ASSESSMENT: 0-10

## 2023-12-09 ASSESSMENT — PAIN DESCRIPTION - LOCATION: LOCATION: GENERALIZED

## 2023-12-09 NOTE — PROGRESS NOTES
Senait Marshall is a 53 y.o. female on day 0 of admission presenting with Sickle cell disease with crisis and other complication (CMS/HCC).    Subjective   Patient seen at bedside. States that she is experiencing generalized pain which she rates 8/10. Denies any new symptoms of fever/chills, N/V/D/C, headache dizziness or vision changes        Objective     Physical Exam  HENT:      Head: Normocephalic.      Nose: Nose normal.      Mouth/Throat:      Mouth: Mucous membranes are moist.   Eyes:      Pupils: Pupils are equal, round, and reactive to light.   Cardiovascular:      Rate and Rhythm: Normal rate.   Pulmonary:      Effort: Pulmonary effort is normal.   Abdominal:      Palpations: Abdomen is soft.   Musculoskeletal:         General: Normal range of motion.      Cervical back: Normal range of motion.   Skin:     General: Skin is warm.   Neurological:      General: No focal deficit present.      Mental Status: She is alert.   Psychiatric:         Mood and Affect: Mood normal.         Behavior: Behavior normal.         Last Recorded Vitals  Blood pressure 110/76, pulse 81, temperature 36.5 °C (97.7 °F), resp. rate 16, SpO2 97 %.  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 Pt has had complications with past outpatient exchanges including SVT following line placement requiring  admission and hypoxia/lethargy following exchange (found to have new pHTN). Patient is now admitted for observation following apheresis line placement and RBCEx. Apheresis line placement 12/7 and routine RBC exchange 12/8. Patient experienced some mild hypotension and lethargy during and after exchange but recovered the following day 12/9. Kidney function worsening during admission. 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  - Managed through routine RBC exchanges q4-6 weeks, most recent RBCEx 10/27/23  - OARRS reviewed, no aberrant behavior noted  - new care plan from 12/6/23- For mild to moderate pain: Dilaudid 0.5mg IVP q3h as needed, for moderate to severe pain Dilaudid 1- 1.5mg q3h PRN  - not currently in pain crisis, admitted for observation following line/red cell exchange  - 12/7 start home Oxycodone 10mg q4hrs PRN mod pain + IVP dilaudid 1.5mg q3h PRN   - Bowel regimen for opioid-induced constipation   - Continue home Duloxetine 20mg daily, PO Zofran PRN, Folic Acid 1mg daily, and MVI daily  - Apheresis line placement 12/7 and routine RBCEx 12/8 completed  - Patient experienced hypotension and lethargy during and after exchange. 1.5L IVF bolus given 12/8.   - Pre-exchange hgb S 28.3-repeat after exchange 13.8      # recently diagnosed pHTN   - 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 Yi and Dr. 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 On lifelong anticoagulation, DOAC discouraged due to high risk of clotting.   - Continue home Coumadin 5mg daily   - Caution with fluids  - Follows with Coumadin clinic outpatient     # CHARLES on CKDII/hyperkalemia   - Baseline ~ SCr <2. Cr 1.37 on admit. 12/8 increased to 3.66  - Urine electrolytes ordered-pending   - Renal U/S ordered-pending   - 12/8 received 1.5L IVF bolus   - Consider nephrology consult if kidney function not improving   - 12/8 K+ 6.8. Received 10u insulin + D50 + D10 50mL x6 hours + Lokelma 10g q8 hours x6 doses   - Follows with nephrology otpt     # CAN  - Continue home CPAP   - Continue home Albuterol PRN     # H/O Cauda Equine syndrome  - Follows Dr. Delacruz as outpatient      # DISPO:  - Full Code, confirmed on admit  - DC home with resumed O2 pending monitoring post red cell exchange   - FUV with sickle cell clinic on discharge           I spent 60 minutes in the professional and overall care of this patient.      Rach Saleh, GRISEL-CNP    Patient discussed with Dr. Cr

## 2023-12-09 NOTE — SIGNIFICANT EVENT
Rapid Response RN Note    Rapid response RN at bedside for RADAR score 6 due to the following VS: T 36 °Celsius; HR 61 ; RR 16; BP 80/46; SPO2 96%.     Reviewed above VS with bedside RN.  RN preparing to give 500 mL NS bolus per provider order.  No interventions by rapid response team indicated at this time.      Staff to page rapid response for any concerns or acute change in condition/VS. Randall Germain RN.

## 2023-12-09 NOTE — CARE PLAN
Pt has been resting in room for majority of shift. Pt remained safe and free from injury during shift. Patient continues to have complaints of pain during shift rating 8/10. Provider was okay with removing patient off of tele at this time. Metoprolol was requested to be held by provider due to patient's BP (see flowsheet).     The clinical goals for the shift include pt will have pain less than 7/10 throughout shift      Problem: Fall/Injury  Goal: Not fall by end of shift  Outcome: Progressing  Goal: Be free from injury by end of the shift  Outcome: Progressing  Goal: Verbalize understanding of personal risk factors for fall in the hospital  Outcome: Progressing  Goal: Verbalize understanding of risk factor reduction measures to prevent injury from fall in the home  Outcome: Progressing  Goal: Use assistive devices by end of the shift  Outcome: Progressing  Goal: Pace activities to prevent fatigue by end of the shift  Outcome: Progressing     Problem: Pain  Goal: Takes deep breaths with improved pain control throughout the shift  Outcome: Progressing  Goal: Turns in bed with improved pain control throughout the shift  Outcome: Progressing  Goal: Walks with improved pain control throughout the shift  Outcome: Progressing  Goal: Performs ADL's with improved pain control throughout shift  Outcome: Progressing  Goal: Participates in PT with improved pain control throughout the shift  Outcome: Progressing  Goal: Free from opioid side effects throughout the shift  Outcome: Progressing  Goal: Free from acute confusion related to pain meds throughout the shift  Outcome: Progressing

## 2023-12-09 NOTE — CARE PLAN
Problem: Fall/Injury  Goal: Not fall by end of shift  Outcome: Progressing  Goal: Be free from injury by end of the shift  Outcome: Progressing  Goal: Verbalize understanding of personal risk factors for fall in the hospital  Outcome: Progressing  Goal: Verbalize understanding of risk factor reduction measures to prevent injury from fall in the home  Outcome: Progressing  Goal: Use assistive devices by end of the shift  Outcome: Progressing  Goal: Pace activities to prevent fatigue by end of the shift  Outcome: Progressing     Problem: Pain  Goal: Takes deep breaths with improved pain control throughout the shift  Outcome: Progressing  Goal: Turns in bed with improved pain control throughout the shift  Outcome: Progressing  Goal: Walks with improved pain control throughout the shift  Outcome: Progressing  Goal: Performs ADL's with improved pain control throughout shift  Outcome: Progressing  Goal: Participates in PT with improved pain control throughout the shift  Outcome: Progressing  Goal: Free from opioid side effects throughout the shift  Outcome: Progressing  Goal: Free from acute confusion related to pain meds throughout the shift  Outcome: Progressing       The clinical goals for the shift include pt will have pain less than 7/10 throughout shift    Over the shift, pt hypotensive. Received 1 LR NS. Vitals stable after bolus. In generalized pain still while taking medications

## 2023-12-10 LAB
ALBUMIN SERPL BCP-MCNC: 3.2 G/DL (ref 3.4–5)
ALP SERPL-CCNC: 83 U/L (ref 33–110)
ALT SERPL W P-5'-P-CCNC: 4 U/L (ref 7–45)
ANION GAP SERPL CALC-SCNC: 10 MMOL/L (ref 10–20)
AST SERPL W P-5'-P-CCNC: 9 U/L (ref 9–39)
BASOPHILS # BLD AUTO: 0.01 X10*3/UL (ref 0–0.1)
BASOPHILS NFR BLD AUTO: 0.1 %
BILIRUB SERPL-MCNC: 0.9 MG/DL (ref 0–1.2)
BUN SERPL-MCNC: 26 MG/DL (ref 6–23)
CALCIUM SERPL-MCNC: 8.4 MG/DL (ref 8.6–10.6)
CHLORIDE SERPL-SCNC: 105 MMOL/L (ref 98–107)
CO2 SERPL-SCNC: 29 MMOL/L (ref 21–32)
CREAT SERPL-MCNC: 1.29 MG/DL (ref 0.5–1.05)
EOSINOPHIL # BLD AUTO: 0.39 X10*3/UL (ref 0–0.7)
EOSINOPHIL NFR BLD AUTO: 4.5 %
ERYTHROCYTE [DISTWIDTH] IN BLOOD BY AUTOMATED COUNT: 26.3 % (ref 11.5–14.5)
GFR SERPL CREATININE-BSD FRML MDRD: 49 ML/MIN/1.73M*2
GLUCOSE SERPL-MCNC: 83 MG/DL (ref 74–99)
HCT VFR BLD AUTO: 25.1 % (ref 36–46)
HGB BLD-MCNC: 8.1 G/DL (ref 12–16)
HYPOCHROMIA BLD QL SMEAR: NORMAL
IMM GRANULOCYTES # BLD AUTO: 0.03 X10*3/UL (ref 0–0.7)
IMM GRANULOCYTES NFR BLD AUTO: 0.3 % (ref 0–0.9)
LYMPHOCYTES # BLD AUTO: 1.51 X10*3/UL (ref 1.2–4.8)
LYMPHOCYTES NFR BLD AUTO: 17.4 %
MCH RBC QN AUTO: 25.5 PG (ref 26–34)
MCHC RBC AUTO-ENTMCNC: 32.3 G/DL (ref 32–36)
MCV RBC AUTO: 79 FL (ref 80–100)
MONOCYTES # BLD AUTO: 0.78 X10*3/UL (ref 0.1–1)
MONOCYTES NFR BLD AUTO: 9 %
NEUTROPHILS # BLD AUTO: 5.95 X10*3/UL (ref 1.2–7.7)
NEUTROPHILS NFR BLD AUTO: 68.7 %
NRBC BLD-RTO: 10.5 /100 WBCS (ref 0–0)
PLATELET # BLD AUTO: 150 X10*3/UL (ref 150–450)
POLYCHROMASIA BLD QL SMEAR: NORMAL
POTASSIUM SERPL-SCNC: 4.7 MMOL/L (ref 3.5–5.3)
PROT SERPL-MCNC: 5.8 G/DL (ref 6.4–8.2)
RBC # BLD AUTO: 3.18 X10*6/UL (ref 4–5.2)
RBC MORPH BLD: NORMAL
SICKLE CELLS BLD QL SMEAR: NORMAL
SODIUM SERPL-SCNC: 139 MMOL/L (ref 136–145)
TARGETS BLD QL SMEAR: NORMAL
WBC # BLD AUTO: 8.7 X10*3/UL (ref 4.4–11.3)

## 2023-12-10 PROCEDURE — 2500000004 HC RX 250 GENERAL PHARMACY W/ HCPCS (ALT 636 FOR OP/ED): Performed by: NURSE PRACTITIONER

## 2023-12-10 PROCEDURE — 1170000001 HC PRIVATE ONCOLOGY ROOM DAILY

## 2023-12-10 PROCEDURE — 85025 COMPLETE CBC W/AUTO DIFF WBC: CPT

## 2023-12-10 PROCEDURE — 2500000001 HC RX 250 WO HCPCS SELF ADMINISTERED DRUGS (ALT 637 FOR MEDICARE OP): Performed by: NURSE PRACTITIONER

## 2023-12-10 PROCEDURE — 99233 SBSQ HOSP IP/OBS HIGH 50: CPT

## 2023-12-10 PROCEDURE — 2500000004 HC RX 250 GENERAL PHARMACY W/ HCPCS (ALT 636 FOR OP/ED)

## 2023-12-10 PROCEDURE — 84075 ASSAY ALKALINE PHOSPHATASE: CPT

## 2023-12-10 RX ADMIN — HYDROMORPHONE HYDROCHLORIDE 1.5 MG: 1 INJECTION, SOLUTION INTRAMUSCULAR; INTRAVENOUS; SUBCUTANEOUS at 09:13

## 2023-12-10 RX ADMIN — HYDROMORPHONE HYDROCHLORIDE 1.5 MG: 1 INJECTION, SOLUTION INTRAMUSCULAR; INTRAVENOUS; SUBCUTANEOUS at 18:42

## 2023-12-10 RX ADMIN — METOPROLOL TARTRATE 25 MG: 25 TABLET, FILM COATED ORAL at 20:33

## 2023-12-10 RX ADMIN — DULOXETINE HYDROCHLORIDE 40 MG: 20 CAPSULE, DELAYED RELEASE ORAL at 09:13

## 2023-12-10 RX ADMIN — SODIUM ZIRCONIUM CYCLOSILICATE 10 G: 10 POWDER, FOR SUSPENSION ORAL at 05:48

## 2023-12-10 RX ADMIN — HYDROMORPHONE HYDROCHLORIDE 1.5 MG: 1 INJECTION, SOLUTION INTRAMUSCULAR; INTRAVENOUS; SUBCUTANEOUS at 05:48

## 2023-12-10 RX ADMIN — FUROSEMIDE 20 MG: 20 TABLET ORAL at 09:13

## 2023-12-10 RX ADMIN — Medication 1 TABLET: at 09:13

## 2023-12-10 RX ADMIN — WARFARIN SODIUM 5 MG: 5 TABLET ORAL at 18:42

## 2023-12-10 RX ADMIN — SENNOSIDES AND DOCUSATE SODIUM 2 TABLET: 8.6; 5 TABLET ORAL at 20:23

## 2023-12-10 RX ADMIN — SENNOSIDES AND DOCUSATE SODIUM 2 TABLET: 8.6; 5 TABLET ORAL at 09:13

## 2023-12-10 RX ADMIN — HYDROMORPHONE HYDROCHLORIDE 1.5 MG: 1 INJECTION, SOLUTION INTRAMUSCULAR; INTRAVENOUS; SUBCUTANEOUS at 02:31

## 2023-12-10 RX ADMIN — HYDROMORPHONE HYDROCHLORIDE 1.5 MG: 1 INJECTION, SOLUTION INTRAMUSCULAR; INTRAVENOUS; SUBCUTANEOUS at 12:27

## 2023-12-10 RX ADMIN — FOLIC ACID 1 MG: 1 TABLET ORAL at 09:13

## 2023-12-10 RX ADMIN — HYDROMORPHONE HYDROCHLORIDE 1.5 MG: 1 INJECTION, SOLUTION INTRAMUSCULAR; INTRAVENOUS; SUBCUTANEOUS at 15:28

## 2023-12-10 RX ADMIN — HYDROMORPHONE HYDROCHLORIDE 1.5 MG: 1 INJECTION, SOLUTION INTRAMUSCULAR; INTRAVENOUS; SUBCUTANEOUS at 22:07

## 2023-12-10 ASSESSMENT — COGNITIVE AND FUNCTIONAL STATUS - GENERAL
DAILY ACTIVITIY SCORE: 24
MOBILITY SCORE: 24
DAILY ACTIVITIY SCORE: 24
MOBILITY SCORE: 24

## 2023-12-10 ASSESSMENT — PAIN SCALES - GENERAL
PAINLEVEL_OUTOF10: 8
PAINLEVEL_OUTOF10: 7
PAINLEVEL_OUTOF10: 8
PAINLEVEL_OUTOF10: 8
PAINLEVEL_OUTOF10: 9
PAINLEVEL_OUTOF10: 8

## 2023-12-10 ASSESSMENT — PAIN - FUNCTIONAL ASSESSMENT
PAIN_FUNCTIONAL_ASSESSMENT: 0-10

## 2023-12-10 NOTE — PROGRESS NOTES
Senait Narvaez is a 53 y.o. female on day 1 of admission presenting with Sickle cell disease with crisis and other complication (CMS/HCC).    Subjective   Patient seen at bedside. States that she continues to have generalized pain. Rates pain 8/10. States that she would like to remain inpatient 1 more day for pain management. Denies SOB, CP, heart palpitations, N/V/D/C, headache dizziness or vision changes        Objective     Physical Exam  Constitutional:       Appearance: Normal appearance.   HENT:      Head: Normocephalic.      Right Ear: Tympanic membrane normal.      Mouth/Throat:      Mouth: Mucous membranes are moist.   Eyes:      Pupils: Pupils are equal, round, and reactive to light.   Cardiovascular:      Rate and Rhythm: Normal rate.   Pulmonary:      Effort: Pulmonary effort is normal.   Abdominal:      Palpations: Abdomen is soft.   Musculoskeletal:         General: Normal range of motion.      Cervical back: Normal range of motion.   Neurological:      General: No focal deficit present.      Mental Status: She is alert.   Psychiatric:         Mood and Affect: Mood normal.         Behavior: Behavior normal.         Last Recorded Vitals  Blood pressure 119/71, pulse 63, temperature 36.3 °C (97.3 °F), temperature source Temporal, resp. rate 18, SpO2 96 %.  Intake/Output last 3 Shifts:  I/O last 3 completed shifts:  In: 1750 [I.V.:250; IV Piggyback:1500]  Out: 300 [Urine:300]    Relevant Results              This patient has a central line   Reason for the central line remaining today?  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 female on day 0 of admission presenting with Sickle cell disease with crisis and other complication (CMS/HCC).        Subjective   Patient seen at  bedside. States that she is experiencing generalized pain which she rates 8/10. Denies any new symptoms of fever/chills, N/V/D/C, headache dizziness or vision changes               Objective   Physical Exam  HENT:      Head: Normocephalic.      Nose: Nose normal.      Mouth/Throat:      Mouth: Mucous membranes are moist.   Eyes:      Pupils: Pupils are equal, round, and reactive to light.   Cardiovascular:      Rate and Rhythm: Normal rate.   Pulmonary:      Effort: Pulmonary effort is normal.   Abdominal:      Palpations: Abdomen is soft.   Musculoskeletal:         General: Normal range of motion.      Cervical back: Normal range of motion.   Skin:     General: Skin is warm.   Neurological:      General: No focal deficit present.      Mental Status: She is alert.   Psychiatric:         Mood and Affect: Mood normal.         Behavior: Behavior normal.            Last Recorded Vitals  Blood pressure 110/76, pulse 81, temperature 36.5 °C (97.7 °F), resp. rate 16, SpO2 97 %.  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 Pt has had complications with past outpatient exchanges including SVT following line placement requiring admission and hypoxia/lethargy following exchange (found to have new pHTN). Patient is now admitted for observation following apheresis line placement  and RBCEx. Apheresis line placement 12/7 and routine RBC exchange 12/8. Patient experienced some mild hypotension and lethargy during and after exchange but recovered the following day 12/9. Kidney function worsening during admission, unclear etiology. Possibly in setting of contrast during line placement. 12/10 Kidney function improved back to patients baseline. 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  - Managed through routine RBC exchanges q4-6 weeks, most recent RBCEx 10/27/23  - OARRS reviewed, no aberrant behavior noted  - new care plan from 12/6/23- For mild to moderate pain: Dilaudid 0.5mg IVP q3h as needed, for moderate to severe pain Dilaudid 1- 1.5mg q3h PRN  - not currently in pain crisis, admitted for observation following line/red cell exchange  - 12/7 start home Oxycodone 10mg q4hrs PRN mod pain + IVP dilaudid 1.5mg q3h PRN   - Bowel regimen for opioid-induced constipation   - Continue home Duloxetine 20mg daily, PO Zofran PRN, Folic Acid 1mg daily, and MVI daily  - Apheresis line placement 12/7 and routine RBCEx 12/8 completed  - Patient experienced hypotension and lethargy during and after exchange. 1.5L IVF bolus given 12/8.   - Pre-exchange hgb S 28.3-repeat after exchange 13.8      # recently diagnosed pHTN   - 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 Yi and Dr. 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 On lifelong anticoagulation, DOAC discouraged due to high risk of clotting.   - Continue home Coumadin 5mg daily   - Caution with fluids  - Follows with Coumadin clinic outpatient     # CHARLES on CKDII/hyperkalemia-resolved   - Baseline ~ SCr <2. Cr 1.37 on admit. 12/8 increased to 3.66  - Urine electrolytes ordered-likely intrinsic   - 12/8 received 1.5L IVF bolus   - 12/8 K+ 6.8. Received 10u insulin + D50 + D10 50mL x6 hours + Lokelma 10g q8 hours x6 doses   - Follows with nephrology otpt     # CAN  - Continue home CPAP   - Continue home Albuterol PRN     # H/O Cauda Equine syndrome  - Follows Dr. Delacruz as outpatient      # DISPO:  - Full Code, confirmed on admit  - DC home with resumed O2 pending monitoring post red cell exchange   - FUV with sickle cell clinic on discharge   - Apheresis line to be pulled before discharge                I spent 60 minutes in the professional and overall care of this patient.      Rach Saleh, APRN-CNP

## 2023-12-10 NOTE — CARE PLAN
Problem: Fall/Injury  Goal: Not fall by end of shift  Outcome: Progressing  Goal: Be free from injury by end of the shift  Outcome: Progressing  Goal: Verbalize understanding of personal risk factors for fall in the hospital  Outcome: Progressing  Goal: Verbalize understanding of risk factor reduction measures to prevent injury from fall in the home  Outcome: Progressing  Goal: Use assistive devices by end of the shift  Outcome: Progressing  Goal: Pace activities to prevent fatigue by end of the shift  Outcome: Progressing     Problem: Pain  Goal: Takes deep breaths with improved pain control throughout the shift  Outcome: Progressing  Goal: Turns in bed with improved pain control throughout the shift  Outcome: Progressing  Goal: Walks with improved pain control throughout the shift  Outcome: Progressing  Goal: Performs ADL's with improved pain control throughout shift  Outcome: Progressing  Goal: Participates in PT with improved pain control throughout the shift  Outcome: Progressing  Goal: Free from opioid side effects throughout the shift  Outcome: Progressing  Goal: Free from acute confusion related to pain meds throughout the shift  Outcome: Progressing     The clinical goals for the shift include pt will report pain less than 7/10 throughout shift    Over the shift, patient still reporting severe pain, getting meds PRN as ordered. Vitals stable, no acute events overnight

## 2023-12-10 NOTE — CARE PLAN
The patient's goals for the shift include      The clinical goals for the shift include Pt will report a decrease in pain by end of shift 12/10 1900      Problem: Fall/Injury  Goal: Not fall by end of shift  Outcome: Progressing  Goal: Be free from injury by end of the shift  Outcome: Progressing  Goal: Verbalize understanding of personal risk factors for fall in the hospital  Outcome: Progressing  Goal: Verbalize understanding of risk factor reduction measures to prevent injury from fall in the home  Outcome: Progressing  Goal: Use assistive devices by end of the shift  Outcome: Progressing  Goal: Pace activities to prevent fatigue by end of the shift  Outcome: Progressing     Problem: Pain  Goal: Takes deep breaths with improved pain control throughout the shift  Outcome: Progressing  Goal: Turns in bed with improved pain control throughout the shift  Outcome: Progressing  Goal: Walks with improved pain control throughout the shift  Outcome: Progressing  Goal: Performs ADL's with improved pain control throughout shift  Outcome: Progressing  Goal: Participates in PT with improved pain control throughout the shift  Outcome: Progressing  Goal: Free from opioid side effects throughout the shift  Outcome: Progressing  Goal: Free from acute confusion related to pain meds throughout the shift  Outcome: Progressing     Pt continues to progress towards goals.

## 2023-12-11 ENCOUNTER — APPOINTMENT (OUTPATIENT)
Dept: CARDIOLOGY | Facility: HOSPITAL | Age: 53
DRG: 812 | End: 2023-12-11
Payer: COMMERCIAL

## 2023-12-11 VITALS
TEMPERATURE: 97.5 F | OXYGEN SATURATION: 100 % | DIASTOLIC BLOOD PRESSURE: 70 MMHG | SYSTOLIC BLOOD PRESSURE: 128 MMHG | HEART RATE: 60 BPM | RESPIRATION RATE: 18 BRPM

## 2023-12-11 LAB
ALBUMIN SERPL BCP-MCNC: 3.1 G/DL (ref 3.4–5)
ALP SERPL-CCNC: 83 U/L (ref 33–110)
ALT SERPL W P-5'-P-CCNC: 5 U/L (ref 7–45)
ANION GAP SERPL CALC-SCNC: 7 MMOL/L (ref 10–20)
AST SERPL W P-5'-P-CCNC: 9 U/L (ref 9–39)
BASOPHILS # BLD AUTO: 0.03 X10*3/UL (ref 0–0.1)
BASOPHILS NFR BLD AUTO: 0.3 %
BILIRUB SERPL-MCNC: 0.9 MG/DL (ref 0–1.2)
BUN SERPL-MCNC: 16 MG/DL (ref 6–23)
CALCIUM SERPL-MCNC: 8.7 MG/DL (ref 8.6–10.6)
CHLORIDE SERPL-SCNC: 104 MMOL/L (ref 98–107)
CO2 SERPL-SCNC: 33 MMOL/L (ref 21–32)
CREAT SERPL-MCNC: 1 MG/DL (ref 0.5–1.05)
EOSINOPHIL # BLD AUTO: 0.3 X10*3/UL (ref 0–0.7)
EOSINOPHIL NFR BLD AUTO: 3.4 %
ERYTHROCYTE [DISTWIDTH] IN BLOOD BY AUTOMATED COUNT: 27.2 % (ref 11.5–14.5)
GFR SERPL CREATININE-BSD FRML MDRD: 67 ML/MIN/1.73M*2
GLUCOSE SERPL-MCNC: 78 MG/DL (ref 74–99)
HCT VFR BLD AUTO: 26.2 % (ref 36–46)
HEMOGLOBIN A2: 2.8 % (ref 2–3.5)
HEMOGLOBIN A: 67.9 % (ref 95.8–98)
HEMOGLOBIN C: 13.2 %
HEMOGLOBIN F: 2.3 % (ref 0–2)
HEMOGLOBIN IDENTIFICATION INTERPRETATION: ABNORMAL
HEMOGLOBIN S: 13.8 %
HGB BLD-MCNC: 8.3 G/DL (ref 12–16)
HYPOCHROMIA BLD QL SMEAR: NORMAL
IMM GRANULOCYTES # BLD AUTO: 0.03 X10*3/UL (ref 0–0.7)
IMM GRANULOCYTES NFR BLD AUTO: 0.3 % (ref 0–0.9)
LYMPHOCYTES # BLD AUTO: 1.65 X10*3/UL (ref 1.2–4.8)
LYMPHOCYTES NFR BLD AUTO: 18.4 %
MCH RBC QN AUTO: 25.4 PG (ref 26–34)
MCHC RBC AUTO-ENTMCNC: 31.7 G/DL (ref 32–36)
MCV RBC AUTO: 80 FL (ref 80–100)
MONOCYTES # BLD AUTO: 0.89 X10*3/UL (ref 0.1–1)
MONOCYTES NFR BLD AUTO: 9.9 %
NEUTROPHILS # BLD AUTO: 6.05 X10*3/UL (ref 1.2–7.7)
NEUTROPHILS NFR BLD AUTO: 67.7 %
NRBC BLD-RTO: 17.4 /100 WBCS (ref 0–0)
PATH REVIEW-HGB IDENTIFICATION: ABNORMAL
PLATELET # BLD AUTO: 171 X10*3/UL (ref 150–450)
POLYCHROMASIA BLD QL SMEAR: NORMAL
POTASSIUM SERPL-SCNC: 4.4 MMOL/L (ref 3.5–5.3)
PROT SERPL-MCNC: 5.7 G/DL (ref 6.4–8.2)
RBC # BLD AUTO: 3.27 X10*6/UL (ref 4–5.2)
RBC MORPH BLD: NORMAL
SODIUM SERPL-SCNC: 140 MMOL/L (ref 136–145)
TARGETS BLD QL SMEAR: NORMAL
WBC # BLD AUTO: 9 X10*3/UL (ref 4.4–11.3)

## 2023-12-11 PROCEDURE — 85025 COMPLETE CBC W/AUTO DIFF WBC: CPT

## 2023-12-11 PROCEDURE — 2500000001 HC RX 250 WO HCPCS SELF ADMINISTERED DRUGS (ALT 637 FOR MEDICARE OP): Performed by: NURSE PRACTITIONER

## 2023-12-11 PROCEDURE — 93005 ELECTROCARDIOGRAM TRACING: CPT

## 2023-12-11 PROCEDURE — 99239 HOSP IP/OBS DSCHRG MGMT >30: CPT | Performed by: PHYSICIAN ASSISTANT

## 2023-12-11 PROCEDURE — 80053 COMPREHEN METABOLIC PANEL: CPT

## 2023-12-11 PROCEDURE — 2500000004 HC RX 250 GENERAL PHARMACY W/ HCPCS (ALT 636 FOR OP/ED): Performed by: NURSE PRACTITIONER

## 2023-12-11 RX ADMIN — HYDROMORPHONE HYDROCHLORIDE 1.5 MG: 1 INJECTION, SOLUTION INTRAMUSCULAR; INTRAVENOUS; SUBCUTANEOUS at 01:08

## 2023-12-11 RX ADMIN — DULOXETINE HYDROCHLORIDE 40 MG: 20 CAPSULE, DELAYED RELEASE ORAL at 09:34

## 2023-12-11 RX ADMIN — FOLIC ACID 1 MG: 1 TABLET ORAL at 09:35

## 2023-12-11 RX ADMIN — SENNOSIDES AND DOCUSATE SODIUM 2 TABLET: 8.6; 5 TABLET ORAL at 09:34

## 2023-12-11 RX ADMIN — Medication 1 TABLET: at 09:35

## 2023-12-11 RX ADMIN — HYDROMORPHONE HYDROCHLORIDE 1.5 MG: 1 INJECTION, SOLUTION INTRAMUSCULAR; INTRAVENOUS; SUBCUTANEOUS at 09:34

## 2023-12-11 RX ADMIN — HYDROMORPHONE HYDROCHLORIDE 1.5 MG: 1 INJECTION, SOLUTION INTRAMUSCULAR; INTRAVENOUS; SUBCUTANEOUS at 06:28

## 2023-12-11 RX ADMIN — METOPROLOL TARTRATE 25 MG: 25 TABLET, FILM COATED ORAL at 09:35

## 2023-12-11 ASSESSMENT — PAIN SCALES - GENERAL
PAINLEVEL_OUTOF10: 8
PAINLEVEL_OUTOF10: 8
PAINLEVEL_OUTOF10: 7

## 2023-12-11 ASSESSMENT — COGNITIVE AND FUNCTIONAL STATUS - GENERAL
DAILY ACTIVITIY SCORE: 24
MOBILITY SCORE: 23
CLIMB 3 TO 5 STEPS WITH RAILING: A LITTLE

## 2023-12-11 ASSESSMENT — PAIN - FUNCTIONAL ASSESSMENT
PAIN_FUNCTIONAL_ASSESSMENT: 0-10

## 2023-12-11 NOTE — DISCHARGE SUMMARY
Discharge Diagnosis  Sickle cell disease with crisis and other complication (CMS/HCC)    Issues Requiring Follow-Up  None, she will follow up in sickle cell clinic.     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 Pt has had complications with past outpatient exchanges including SVT following line placement requiring admission and hypoxia/lethargy following exchange (found to have new pHTN). Patient is now admitted for observation following apheresis line placement and RBCEx. Apheresis line placement 12/7 and routine RBC exchange 12/8. Patient experienced some mild hypotension and lethargy during and after exchange but recovered the following day 12/9. Kidney function worsening during admission, unclear etiology. Possibly in setting of contrast during line placement. Kidney function back to baseline on day of discharge. Patient will call to make an apt with sickle cell clinic.   On the day of discharge, the patient reported feeling well and pain was controlled. Vitals and labs were stable.   Attending has reviewed all labs and vitals, and discussed and agreed with the discharge plan prior to patient discharge.  Patient discharged in stable condition. > 30 minutes spent on discharge planning.  Pertinent Physical Exam At Time of Discharge  Physical Exam  General: alert, awake, and well-developed  Skin: warm, dry, intact  Head/Neck: NCAT, supple  Eyes: EOMI, no scleral icterus  Mouth: OMM, no erythema or  discharge   Lungs: CTA, no adventitious breath sounds  Cardiovascular: RRR,no m/r/g, no edema  Abdomen: +BS, soft, non-tender, non-distended  Neuro: normal movement, normal speech   Psych: normal affect and mood  Home Medications     Medication List      CONTINUE taking these medications     albuterol 90 mcg/actuation inhaler   cyclobenzaprine 10 mg tablet; Commonly known as: Flexeril   DULoxetine 20 mg DR capsule; Commonly known as: Cymbalta; Take 1 capsule   (20 mg) by mouth once daily for 1 day, THEN 2 capsules (40 mg) once daily.   Do not crush or chew..; Start taking on: December 6, 2023   folic acid 1 mg tablet; Commonly known as: Folvite   furosemide 20 mg tablet; Commonly known as: Lasix   HYDROmorphone 2 mg tablet; Commonly known as: Dilaudid   loratadine 10 mg tablet; Commonly known as: Claritin   metoprolol tartrate 25 mg tablet; Commonly known as: Lopressor   multivitamin tablet   ondansetron 4 mg tablet; Commonly known as: Zofran; Take 1 tablet (4 mg)   by mouth every 8 hours if needed for nausea or vomiting.   oxyCODONE 10 mg immediate release tablet; Commonly known as: Roxicodone;   Take 1 tablet (10 mg) by mouth every 4 hours if needed for severe pain (7   - 10) (pain).   oxygen gas therapy; Commonly known as: O2; Inhale 1 each continuously.   traZODone 50 mg tablet; Commonly known as: Desyrel; Take 1 tablet (50   mg) by mouth as needed at bedtime for sleep. 1/2 - 1 tabs for insomnia   warfarin 5 mg tablet; Commonly known as: Coumadin; Take as directed. If   you are unsure how to take this medication, talk to your nurse or doctor.     STOP taking these medications     mometasone 50 mcg/actuation nasal spray; Commonly known as: Nasonex       Outpatient Follow-Up  Future Appointments   Date Time Provider Department Center   1/5/2024  3:00 PM GRISEL Petty-CNP [id number] [facility]   1/11/2024  1:40 PM Clive Jorge MD [id number] [facility]   1/15/2024  9:00 AM Mirna Vital MD  [id number] [facility]   7/15/2024 10:20 AM Ankur White DO [id number] [facility]       Leanne Gonzales PA-C

## 2023-12-11 NOTE — NURSING NOTE
12/11/23 1230:    Pt discharge complete. CVC removed by provider prior to discharge. All patient belongings taken by patient and discharge instructions and medication education given to patient. Vitals stable at time of discharge.

## 2023-12-11 NOTE — CARE PLAN
Problem: Fall/Injury  Goal: Not fall by end of shift  Outcome: Progressing  Goal: Be free from injury by end of the shift  Outcome: Progressing  Goal: Verbalize understanding of personal risk factors for fall in the hospital  Outcome: Progressing  Goal: Verbalize understanding of risk factor reduction measures to prevent injury from fall in the home  Outcome: Progressing  Goal: Use assistive devices by end of the shift  Outcome: Progressing  Goal: Pace activities to prevent fatigue by end of the shift  Outcome: Progressing     Problem: Pain  Goal: Takes deep breaths with improved pain control throughout the shift  Outcome: Progressing  Goal: Turns in bed with improved pain control throughout the shift  Outcome: Progressing  Goal: Walks with improved pain control throughout the shift  Outcome: Progressing  Goal: Performs ADL's with improved pain control throughout shift  Outcome: Progressing  Goal: Participates in PT with improved pain control throughout the shift  Outcome: Progressing  Goal: Free from opioid side effects throughout the shift  Outcome: Progressing  Goal: Free from acute confusion related to pain meds throughout the shift  Outcome: Progressing       The clinical goals for the shift include p wwill report pain less than 7/10 throughou shift    Over the shift, pt got pain meds. Vitals stable. No acute events overnight

## 2023-12-11 NOTE — CARE PLAN
The patient's goals for the shift include      The clinical goals for the shift include pt will rate pain at tolerable level by time of discharge      Problem: Fall/Injury  Goal: Not fall by end of shift  Outcome: Met  Goal: Be free from injury by end of the shift  Outcome: Met  Goal: Verbalize understanding of personal risk factors for fall in the hospital  Outcome: Met  Goal: Verbalize understanding of risk factor reduction measures to prevent injury from fall in the home  Outcome: Met  Goal: Use assistive devices by end of the shift  Outcome: Met  Goal: Pace activities to prevent fatigue by end of the shift  Outcome: Met     Problem: Pain  Goal: Takes deep breaths with improved pain control throughout the shift  Outcome: Met  Goal: Turns in bed with improved pain control throughout the shift  Outcome: Met  Goal: Walks with improved pain control throughout the shift  Outcome: Met  Goal: Performs ADL's with improved pain control throughout shift  Outcome: Met  Goal: Participates in PT with improved pain control throughout the shift  Outcome: Met  Goal: Free from opioid side effects throughout the shift  Outcome: Met  Goal: Free from acute confusion related to pain meds throughout the shift  Outcome: Met     All patient goals met at time of discharge.

## 2023-12-12 ENCOUNTER — ANTICOAGULATION - WARFARIN VISIT (OUTPATIENT)
Dept: CARDIOLOGY | Facility: CLINIC | Age: 53
End: 2023-12-12
Payer: COMMERCIAL

## 2023-12-12 DIAGNOSIS — I82.4Y9 DEEP VEIN THROMBOSIS (DVT) OF PROXIMAL LOWER EXTREMITY, UNSPECIFIED CHRONICITY, UNSPECIFIED LATERALITY (MULTI): Primary | ICD-10-CM

## 2023-12-12 DIAGNOSIS — I26.99 RECURRENT PULMONARY EMBOLISM (MULTI): ICD-10-CM

## 2023-12-12 DIAGNOSIS — I82.210 THROMBOSIS OF SUPERIOR VENA CAVA (MULTI): ICD-10-CM

## 2023-12-12 LAB
HEMOGLOBIN A2: 3 % (ref 2–3.5)
HEMOGLOBIN A: 68 % (ref 95.8–98)
HEMOGLOBIN C: 14.3 %
HEMOGLOBIN F: 0.4 % (ref 0–2)
HEMOGLOBIN IDENTIFICATION INTERPRETATION: ABNORMAL
HEMOGLOBIN S: 14.3 %
PATH REVIEW-HGB IDENTIFICATION: ABNORMAL

## 2023-12-13 LAB
ATRIAL RATE: 62 BPM
HOLD SPECIMEN: NORMAL
INR IN PPP BY COAGULATION ASSAY EXTERNAL: 2.7
P AXIS: 32 DEGREES
P OFFSET: 171 MS
P ONSET: 125 MS
PR INTERVAL: 176 MS
PROTHROMBIN TIME (PT) IN PPP BY COAGULATION ASSAY EXTERNAL: NORMAL SECONDS
Q ONSET: 213 MS
QRS COUNT: 10 BEATS
QRS DURATION: 84 MS
QT INTERVAL: 422 MS
QTC CALCULATION(BAZETT): 428 MS
QTC FREDERICIA: 426 MS
R AXIS: 119 DEGREES
T AXIS: 94 DEGREES
T OFFSET: 424 MS
VENTRICULAR RATE: 62 BPM

## 2023-12-13 NOTE — PROGRESS NOTES
Patient identification verified with 2 identifiers.    Location: [facility] Patient Self-Testing Program [phone]    Referring Physician: JERO UGALDE CNP  Enrollment/ Re-enrollment date: 2024   INR Goal: 2.0-3.0  INR monitoring is per [facility] protocol.  Anticoagulation Medication: warfarin  Indication: Deep Vein Thrombosis (DVT)    Subjective   Bleeding signs/symptoms: No    Bruising: No   Major bleeding event: No  Thrombosis signs/symptoms: No  Thromboembolic event: No  Missed doses: No  Extra doses: No  Medication changes: No  Dietary changes: No  Change in health: No  Change in activity: No  Alcohol: No  Other concerns: No    Upcoming Surgeries:  Does the Patient Have any upcoming surgeries that require interruption in anticoagulation therapy? no  Does the patient require bridging? no      Anticoagulation Summary  As of 2023      INR goal:  2.0-3.0   TTR:  76.9 % (1.3 mo)   INR used for dosin.70 (2023)   Weekly warfarin total:  35 mg               Assessment/Plan   Therapeutic     1. New dose: no change    2. Next INR: 2 weeks      Education provided to patient during the visit:  Patient instructed to call in interim with questions, concerns and changes.   Patient educated on interactions between medications and warfarin.   Patient educated on dietary consistency in vitamin k consumption.   Patient educated on affects of alcohol consumption while taking warfarin.   Patient educated on signs of bleeding/clotting.   Patient educated on compliance with dosing, follow up appointments, and prescribed plan of care.

## 2023-12-17 NOTE — DOCUMENTATION CLARIFICATION NOTE
"    PATIENT:               KIRSTY MARSHALL  ACCT #:                  [id number]  MRN:                       [id number]  :                       1970  ADMIT DATE:       2023 12:14 PM  DISCH DATE:        2023 12:28 PM  RESPONDING PROVIDER #:        [id number]          PROVIDER RESPONSE TEXT:    Acute kidney injury without acute tubular necrosis    CDI QUERY TEXT:    [facility]_ATN        Instruction:    Based on your assessment of the patient and the clinical information, please provide the requested documentation by clicking on the appropriate radio button and enter any additional information if prompted.    Question: Please further clarify the diagnosis of acute kidney injury as    When answering this query, please exercise your independent professional judgment. The fact that a question is being asked, does not imply that any particular answer is desired or expected.    The patient's clinical indicators include:  Clinical Information: 53 year old female admitted for apheresis line placement and RBC exchange.    Clinical Indicators:  -  Oncology PN states, \"CHARLES on CKD II...Baseline SCr <2. Cr 1.37 on admit.  increased to 3.66. Urine electrolytes ordered. Renal US ordered.  received 1.5L IVF bolus\".  - 12/10 Oncology PN states, \"Kidney function worsening during admission, unclear etiology. Possibly in setting of contrast during line placement...Urine electrolytes ordered-likely intrinsic\".  -  Urine sodium 63  - Creatinine levels -  1.37   2.45, 3.10   3.42  12/10 1.29   1.00    Treatment:  - IVFs  - Monitoring of renal function  - Renal ultrasound    Risk Factors:  - IV contrast on   - Hypotension  - CKD II  Options provided:  -- Acute kidney injury with acute tubular necrosis  -- Acute kidney injury without acute tubular necrosis  -- Other - I will add my own diagnosis  -- Refer to Clinical Documentation Reviewer    Query created by: Maria De Jesus Green on 12/15/2023 3:03 " PM      Electronically signed by:  PENG REARDON PA-C 12/17/2023 7:06 AM

## 2023-12-20 ENCOUNTER — TELEPHONE (OUTPATIENT)
Dept: ADMISSION | Facility: HOSPITAL | Age: 53
End: 2023-12-20
Payer: COMMERCIAL

## 2023-12-20 DIAGNOSIS — D57.00 SICKLE CELL CRISIS (MULTI): ICD-10-CM

## 2023-12-20 RX ORDER — OXYCODONE HYDROCHLORIDE 10 MG/1
10 TABLET ORAL EVERY 4 HOURS PRN
Qty: 75 TABLET | Refills: 0 | Status: SHIPPED | OUTPATIENT
Start: 2023-12-20 | End: 2024-01-04 | Stop reason: SDUPTHER

## 2023-12-20 NOTE — TELEPHONE ENCOUNTER
Senait Narvaez called the refill line for Oxycodone 10mg. Requesting refills be sent to [facility] pharmacy; message sent to Sickle Cell team to submit.

## 2023-12-27 ENCOUNTER — ANTICOAGULATION - WARFARIN VISIT (OUTPATIENT)
Dept: CARDIOLOGY | Facility: CLINIC | Age: 53
End: 2023-12-27
Payer: COMMERCIAL

## 2023-12-27 DIAGNOSIS — I82.210 THROMBOSIS OF SUPERIOR VENA CAVA (MULTI): ICD-10-CM

## 2023-12-27 DIAGNOSIS — I26.99 RECURRENT PULMONARY EMBOLISM (MULTI): ICD-10-CM

## 2023-12-27 DIAGNOSIS — I82.4Y9 DEEP VEIN THROMBOSIS (DVT) OF PROXIMAL LOWER EXTREMITY, UNSPECIFIED CHRONICITY, UNSPECIFIED LATERALITY (MULTI): ICD-10-CM

## 2023-12-28 ENCOUNTER — ANTICOAGULATION - WARFARIN VISIT (OUTPATIENT)
Dept: CARDIOLOGY | Facility: CLINIC | Age: 53
End: 2023-12-28
Payer: COMMERCIAL

## 2023-12-28 DIAGNOSIS — I82.210 THROMBOSIS OF SUPERIOR VENA CAVA (MULTI): ICD-10-CM

## 2023-12-28 DIAGNOSIS — I82.4Y9 DEEP VEIN THROMBOSIS (DVT) OF PROXIMAL LOWER EXTREMITY, UNSPECIFIED CHRONICITY, UNSPECIFIED LATERALITY (MULTI): Primary | ICD-10-CM

## 2023-12-28 DIAGNOSIS — I26.99 RECURRENT PULMONARY EMBOLISM (MULTI): ICD-10-CM

## 2023-12-28 NOTE — PROGRESS NOTES
Patient identification verified with 2 identifiers.    Location: [facility] Patient Self-Testing Program [phone]    Referring Physician: TOMAS Albert   Enrollment/ Re-enrollment date: 11/14/2024   INR Goal: 2.0-3.0  INR monitoring is per [facility] protocol.  Anticoagulation Medication: warfarin  Indication: Deep Vein Thrombosis (DVT) and Pulmonary Embolism (PE)    Subjective   Bleeding signs/symptoms: No    Bruising: No   Major bleeding event: No  Thrombosis signs/symptoms: No  Thromboembolic event: No  Missed doses: No  Extra doses: No  Medication changes: No  Dietary changes: No  Change in health: No  Change in activity: No  Alcohol: No  Other concerns: No    Upcoming Surgeries:  Does the Patient Have any upcoming surgeries that require interruption in anticoagulation therapy? no  Does the patient require bridging? no      Anticoagulation Summary  As of 12/28/2023      INR goal:  2.0-3.0   TTR:  76.9 % (1.3 mo)   INR used for dosing:                 Assessment/Plan   Therapeutic     1. New dose: no change    2. Next INR: 2 weeks      Education provided to patient during the visit:  Patient instructed to call in interim with questions, concerns and changes.

## 2024-01-04 ENCOUNTER — TELEPHONE (OUTPATIENT)
Dept: ADMISSION | Facility: HOSPITAL | Age: 54
End: 2024-01-04
Payer: COMMERCIAL

## 2024-01-04 DIAGNOSIS — D57.00 SICKLE CELL CRISIS (MULTI): ICD-10-CM

## 2024-01-04 RX ORDER — NALOXONE HYDROCHLORIDE 4 MG/.1ML
4 SPRAY NASAL AS NEEDED
Qty: 2 EACH | Refills: 0 | Status: ON HOLD | OUTPATIENT
Start: 2024-01-04 | End: 2025-01-03

## 2024-01-04 RX ORDER — OXYCODONE HYDROCHLORIDE 10 MG/1
10 TABLET ORAL EVERY 4 HOURS PRN
Qty: 75 TABLET | Refills: 0 | Status: SHIPPED | OUTPATIENT
Start: 2024-01-04 | End: 2024-01-16 | Stop reason: SDUPTHER

## 2024-01-05 ENCOUNTER — APPOINTMENT (OUTPATIENT)
Dept: PULMONOLOGY | Facility: HOSPITAL | Age: 54
End: 2024-01-05
Payer: COMMERCIAL

## 2024-01-08 DIAGNOSIS — D57.00 SICKLE CELL DISEASE WITH CRISIS (MULTI): Primary | ICD-10-CM

## 2024-01-11 ENCOUNTER — ANTICOAGULATION - WARFARIN VISIT (OUTPATIENT)
Dept: CARDIOLOGY | Facility: CLINIC | Age: 54
End: 2024-01-11

## 2024-01-11 ENCOUNTER — OFFICE VISIT (OUTPATIENT)
Dept: NEPHROLOGY | Facility: CLINIC | Age: 54
End: 2024-01-11
Payer: COMMERCIAL

## 2024-01-11 VITALS
HEART RATE: 79 BPM | DIASTOLIC BLOOD PRESSURE: 66 MMHG | WEIGHT: 242 LBS | SYSTOLIC BLOOD PRESSURE: 104 MMHG | HEIGHT: 65 IN | BODY MASS INDEX: 40.32 KG/M2

## 2024-01-11 DIAGNOSIS — I82.210 THROMBOSIS OF SUPERIOR VENA CAVA (MULTI): ICD-10-CM

## 2024-01-11 DIAGNOSIS — N18.2 CKD (CHRONIC KIDNEY DISEASE) STAGE 2, GFR 60-89 ML/MIN: Primary | ICD-10-CM

## 2024-01-11 DIAGNOSIS — I26.99 RECURRENT PULMONARY EMBOLISM (MULTI): ICD-10-CM

## 2024-01-11 DIAGNOSIS — I82.4Y9 DEEP VEIN THROMBOSIS (DVT) OF PROXIMAL LOWER EXTREMITY, UNSPECIFIED CHRONICITY, UNSPECIFIED LATERALITY (MULTI): Primary | ICD-10-CM

## 2024-01-11 PROCEDURE — 3008F BODY MASS INDEX DOCD: CPT | Performed by: INTERNAL MEDICINE

## 2024-01-11 PROCEDURE — 1036F TOBACCO NON-USER: CPT | Performed by: INTERNAL MEDICINE

## 2024-01-11 PROCEDURE — 99213 OFFICE O/P EST LOW 20 MIN: CPT | Performed by: INTERNAL MEDICINE

## 2024-01-11 RX ORDER — FLUTICASONE PROPIONATE 50 MCG
2 SPRAY, SUSPENSION (ML) NASAL AS NEEDED
Status: ON HOLD | COMMUNITY
Start: 2023-10-26

## 2024-01-11 RX ORDER — ASCORBIC ACID 500 MG
500 TABLET,CHEWABLE ORAL DAILY
Status: ON HOLD | COMMUNITY

## 2024-01-11 RX ORDER — ALBUTEROL SULFATE 90 UG/1
2 AEROSOL, METERED RESPIRATORY (INHALATION) 4 TIMES DAILY
Status: ON HOLD | COMMUNITY
Start: 2014-01-04 | End: 2024-01-18 | Stop reason: WASHOUT

## 2024-01-11 RX ORDER — MILK THISTLE 150 MG
1 CAPSULE ORAL DAILY
Status: ON HOLD | COMMUNITY

## 2024-01-11 NOTE — PATIENT INSTRUCTIONS
Please check BP daily at different times of the day, if the upper number (systolic) is less than 100 mm, do not take the water pill (furosemide). If it stays low for many days, please contact your heart doctor/PCP.   See me in one year, sooner if your kidney number worsens. Check kidney blood test and urine test every 6 months
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
## 2024-01-12 LAB
INR IN PPP BY COAGULATION ASSAY EXTERNAL: 1 (ref 2–3)
PROTHROMBIN TIME (PT) IN PPP BY COAGULATION ASSAY EXTERNAL: ABNORMAL SECONDS

## 2024-01-12 NOTE — PROGRESS NOTES
Patient identification verified with 2 identifiers.    Location: [facility] Patient Self-Testing Program [phone]    Referring Physician: TOMAS Albert   Enrollment/ Re-enrollment date: 2024   INR Goal: 2.0-3.0  INR monitoring is per [facility] protocol.  Anticoagulation Medication: warfarin  Indication: Deep Vein Thrombosis (DVT) and Pulmonary Embolism (PE)    Subjective   Bleeding signs/symptoms: No    Bruising: No   Major bleeding event: No  Thrombosis signs/symptoms: No  Thromboembolic event: No  Missed doses: Yes  Extra doses: No  Medication changes: No  Dietary changes: No  Change in health: Yes  Change in activity: No  Alcohol: No  Other concerns: Yes    Upcoming Surgeries:  Does the Patient Have any upcoming surgeries that require interruption in anticoagulation therapy? no  Does the patient require bridging? no      Anticoagulation Summary  As of 2024      INR goal:  2.0-3.0   TTR:  71.4 % (2.3 mo)   INR used for dosin.00 (2024)   Weekly warfarin total:  35 mg               Assessment/Plan   Subtherapeutic     1. New dose:  Messaged Maria De Jesus Watt CNP regarding pt's 1.0 INR.  Pt will take 10mg warfarin today 5mg Sat and 5mg Sun.  She will retest on Monday.  Pt states understanding of dosing.     2. Next INR:  3 days      Education provided to patient during the visit:  Patient instructed to call in interim with questions, concerns and changes.   Patient educated on signs of bleeding/clotting.   Patient educated on compliance with dosing, follow up appointments, and prescribed plan of care.
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
## 2024-01-15 ENCOUNTER — OFFICE VISIT (OUTPATIENT)
Dept: RHEUMATOLOGY | Facility: CLINIC | Age: 54
End: 2024-01-15
Payer: COMMERCIAL

## 2024-01-15 ENCOUNTER — ANTICOAGULATION - WARFARIN VISIT (OUTPATIENT)
Dept: CARDIOLOGY | Facility: CLINIC | Age: 54
End: 2024-01-15

## 2024-01-15 VITALS
HEART RATE: 75 BPM | SYSTOLIC BLOOD PRESSURE: 111 MMHG | TEMPERATURE: 97.7 F | BODY MASS INDEX: 40.32 KG/M2 | HEIGHT: 65 IN | WEIGHT: 242 LBS | DIASTOLIC BLOOD PRESSURE: 60 MMHG

## 2024-01-15 DIAGNOSIS — I82.210 THROMBOSIS OF SUPERIOR VENA CAVA (MULTI): ICD-10-CM

## 2024-01-15 DIAGNOSIS — N18.31 STAGE 3A CHRONIC KIDNEY DISEASE (MULTI): ICD-10-CM

## 2024-01-15 DIAGNOSIS — I82.4Y9 DEEP VEIN THROMBOSIS (DVT) OF PROXIMAL LOWER EXTREMITY, UNSPECIFIED CHRONICITY, UNSPECIFIED LATERALITY (MULTI): Primary | ICD-10-CM

## 2024-01-15 DIAGNOSIS — I26.99 RECURRENT PULMONARY EMBOLISM (MULTI): ICD-10-CM

## 2024-01-15 DIAGNOSIS — Z82.61 FAMILY HISTORY OF RHEUMATOID ARTHRITIS: ICD-10-CM

## 2024-01-15 LAB
INR IN PPP BY COAGULATION ASSAY EXTERNAL: 1.8 (ref 2–3)
PROTHROMBIN TIME (PT) IN PPP BY COAGULATION ASSAY EXTERNAL: ABNORMAL SECONDS

## 2024-01-15 PROCEDURE — 99214 OFFICE O/P EST MOD 30 MIN: CPT | Performed by: STUDENT IN AN ORGANIZED HEALTH CARE EDUCATION/TRAINING PROGRAM

## 2024-01-15 PROCEDURE — 1036F TOBACCO NON-USER: CPT | Performed by: STUDENT IN AN ORGANIZED HEALTH CARE EDUCATION/TRAINING PROGRAM

## 2024-01-15 PROCEDURE — 3008F BODY MASS INDEX DOCD: CPT | Performed by: STUDENT IN AN ORGANIZED HEALTH CARE EDUCATION/TRAINING PROGRAM

## 2024-01-15 ASSESSMENT — PAIN SCALES - GENERAL: PAINLEVEL: 7

## 2024-01-15 NOTE — PROGRESS NOTES
Patient identification verified with 2 identifiers.    Location: [facility] Patient Self-Testing Program [phone]    Referring Physician: TOMAS Albert   Enrollment/ Re-enrollment date: 2024   INR Goal: 2.0-3.0  INR monitoring is per [facility] protocol.  Anticoagulation Medication: warfarin  Indication: Deep Vein Thrombosis (DVT) and Pulmonary Embolism (PE)    Subjective   Bleeding signs/symptoms: No    Bruising: No   Major bleeding event: No  Thrombosis signs/symptoms: No  Thromboembolic event: No  Missed doses: No  Extra doses: No  Medication changes: No  Dietary changes: No  Change in health: No  Change in activity: No  Alcohol: No  Other concerns: No    Upcoming Surgeries:  Does the Patient Have any upcoming surgeries that require interruption in anticoagulation therapy? yes Sickle cell blood treatment  Does the patient require bridging? no      Anticoagulation Summary  As of 1/15/2024      INR goal:  2.0-3.0   TTR:  68.4 % (2.4 mo)   INR used for dosin.80 (1/15/2024)   Weekly warfarin total:  35 mg               Assessment/Plan   Subtherapeutic     1. New dose:  Pt will take 10mg warfarin today (additional 5mg tablet) and 5mg Tomorrow.  She will retest on Wednesday.  Pt states understanding of dosing.  Pt plans to hold coumadin again beginning Wed for sickle cell treatment on Fri.     2. Next INR:  2 days      Education provided to patient during the visit:  Patient instructed to call in interim with questions, concerns and changes.   Patient educated on signs of bleeding/clotting.   Patient educated on compliance with dosing, follow up appointments, and prescribed plan of care.

## 2024-01-16 ENCOUNTER — OFFICE VISIT (OUTPATIENT)
Dept: HEMATOLOGY/ONCOLOGY | Facility: HOSPITAL | Age: 54
DRG: 812 | End: 2024-01-16
Payer: COMMERCIAL

## 2024-01-16 ENCOUNTER — LAB (OUTPATIENT)
Dept: LAB | Facility: HOSPITAL | Age: 54
DRG: 812 | End: 2024-01-16
Payer: COMMERCIAL

## 2024-01-16 ENCOUNTER — HOSPITAL ENCOUNTER (OUTPATIENT)
Dept: RADIOLOGY | Facility: HOSPITAL | Age: 54
Discharge: HOME | DRG: 812 | End: 2024-01-16
Payer: COMMERCIAL

## 2024-01-16 VITALS
RESPIRATION RATE: 17 BRPM | WEIGHT: 237.9 LBS | TEMPERATURE: 96.4 F | OXYGEN SATURATION: 98 % | HEART RATE: 81 BPM | SYSTOLIC BLOOD PRESSURE: 131 MMHG | DIASTOLIC BLOOD PRESSURE: 66 MMHG | BODY MASS INDEX: 39.59 KG/M2

## 2024-01-16 DIAGNOSIS — D57.1 SICKLE CELL ANEMIA WITHOUT CRISIS (MULTI): ICD-10-CM

## 2024-01-16 DIAGNOSIS — N18.31 STAGE 3A CHRONIC KIDNEY DISEASE (MULTI): ICD-10-CM

## 2024-01-16 DIAGNOSIS — D57.00 SICKLE CELL DISEASE WITH CRISIS (MULTI): ICD-10-CM

## 2024-01-16 DIAGNOSIS — Z82.61 FAMILY HISTORY OF RHEUMATOID ARTHRITIS: ICD-10-CM

## 2024-01-16 DIAGNOSIS — D57.00 SICKLE CELL CRISIS (MULTI): ICD-10-CM

## 2024-01-16 DIAGNOSIS — D57.1 SICKLE-CELL DISEASE WITHOUT CRISIS (MULTI): ICD-10-CM

## 2024-01-16 LAB
25(OH)D3 SERPL-MCNC: 10 NG/ML (ref 30–100)
ABO GROUP (TYPE) IN BLOOD: NORMAL
ALBUMIN SERPL BCP-MCNC: 4.2 G/DL (ref 3.4–5)
ALP SERPL-CCNC: 124 U/L (ref 33–110)
ALT SERPL W P-5'-P-CCNC: 8 U/L (ref 7–45)
ANION GAP SERPL CALC-SCNC: 10 MMOL/L (ref 10–20)
ANTIBODY SCREEN: NORMAL
APPEARANCE UR: CLEAR
AST SERPL W P-5'-P-CCNC: 12 U/L (ref 9–39)
BASOPHILS # BLD AUTO: 0.01 X10*3/UL (ref 0–0.1)
BASOPHILS NFR BLD AUTO: 0.1 %
BILIRUB SERPL-MCNC: 1.3 MG/DL (ref 0–1.2)
BILIRUB UR STRIP.AUTO-MCNC: NEGATIVE MG/DL
BUN SERPL-MCNC: 11 MG/DL (ref 6–23)
C3 SERPL-MCNC: 131 MG/DL (ref 87–200)
C4 SERPL-MCNC: 34 MG/DL (ref 10–50)
CA-I BLD-SCNC: 1.18 MMOL/L (ref 1.1–1.33)
CALCIUM SERPL-MCNC: 9.4 MG/DL (ref 8.6–10.3)
CCP IGG SERPL-ACNC: <1 U/ML
CHLORIDE SERPL-SCNC: 100 MMOL/L (ref 98–107)
CO2 SERPL-SCNC: 30 MMOL/L (ref 21–32)
COLOR UR: YELLOW
CREAT SERPL-MCNC: 0.99 MG/DL (ref 0.5–1.05)
CREAT UR-MCNC: 136.5 MG/DL (ref 20–320)
CRP SERPL-MCNC: 1.01 MG/DL
EGFRCR SERPLBLD CKD-EPI 2021: 68 ML/MIN/1.73M*2
EOSINOPHIL # BLD AUTO: 0.05 X10*3/UL (ref 0–0.7)
EOSINOPHIL NFR BLD AUTO: 0.7 %
ERYTHROCYTE [DISTWIDTH] IN BLOOD BY AUTOMATED COUNT: 26.1 % (ref 11.5–14.5)
FERRITIN SERPL-MCNC: 41 NG/ML (ref 8–150)
GLUCOSE SERPL-MCNC: 105 MG/DL (ref 74–99)
GLUCOSE UR STRIP.AUTO-MCNC: NEGATIVE MG/DL
HCT VFR BLD AUTO: 34.7 % (ref 36–46)
HGB BLD-MCNC: 11.5 G/DL (ref 12–16)
HGB RETIC QN: 20 PG (ref 28–38)
HYPOCHROMIA BLD QL SMEAR: NORMAL
IMM GRANULOCYTES # BLD AUTO: 0.02 X10*3/UL (ref 0–0.7)
IMM GRANULOCYTES NFR BLD AUTO: 0.3 % (ref 0–0.9)
IMMATURE RETIC FRACTION: 42.2 %
INR PPP: 2.2 (ref 0.9–1.1)
IRON SATN MFR SERPL: ABNORMAL %
IRON SERPL-MCNC: 13 UG/DL (ref 35–150)
KETONES UR STRIP.AUTO-MCNC: NEGATIVE MG/DL
LDH SERPL L TO P-CCNC: 183 U/L (ref 84–246)
LEUKOCYTE ESTERASE UR QL STRIP.AUTO: NEGATIVE
LYMPHOCYTES # BLD AUTO: 1.38 X10*3/UL (ref 1.2–4.8)
LYMPHOCYTES NFR BLD AUTO: 18.8 %
MCH RBC QN AUTO: 23.4 PG (ref 26–34)
MCHC RBC AUTO-ENTMCNC: 33.1 G/DL (ref 32–36)
MCV RBC AUTO: 71 FL (ref 80–100)
MONOCYTES # BLD AUTO: 0.38 X10*3/UL (ref 0.1–1)
MONOCYTES NFR BLD AUTO: 5.2 %
NEUTROPHILS # BLD AUTO: 5.52 X10*3/UL (ref 1.2–7.7)
NEUTROPHILS NFR BLD AUTO: 74.9 %
NITRITE UR QL STRIP.AUTO: NEGATIVE
NRBC BLD-RTO: 0.4 /100 WBCS (ref 0–0)
PH UR STRIP.AUTO: 7 [PH]
PLATELET # BLD AUTO: 351 X10*3/UL (ref 150–450)
POLYCHROMASIA BLD QL SMEAR: NORMAL
POTASSIUM SERPL-SCNC: 3.7 MMOL/L (ref 3.5–5.3)
PROT SERPL-MCNC: 7.9 G/DL (ref 6.4–8.2)
PROT UR STRIP.AUTO-MCNC: NEGATIVE MG/DL
PROT UR-ACNC: 15 MG/DL (ref 5–24)
PROT/CREAT UR: 0.11 MG/MG CREAT (ref 0–0.17)
PROTHROMBIN TIME: 24.7 SECONDS (ref 9.8–12.8)
RBC # BLD AUTO: 4.92 X10*6/UL (ref 4–5.2)
RBC # UR STRIP.AUTO: NEGATIVE /UL
RBC MORPH BLD: NORMAL
RETICS #: 0.02 X10*6/UL (ref 0.02–0.08)
RETICS/RBC NFR AUTO: 0.4 % (ref 0.5–2)
RH FACTOR (ANTIGEN D): NORMAL
RHEUMATOID FACT SER NEPH-ACNC: <10 IU/ML (ref 0–15)
SCHISTOCYTES BLD QL SMEAR: NORMAL
SODIUM SERPL-SCNC: 136 MMOL/L (ref 136–145)
SP GR UR STRIP.AUTO: 1.01
TARGETS BLD QL SMEAR: NORMAL
TIBC SERPL-MCNC: ABNORMAL UG/DL
UIBC SERPL-MCNC: >450 UG/DL (ref 110–370)
URATE SERPL-MCNC: 5.4 MG/DL (ref 2.3–6.7)
UROBILINOGEN UR STRIP.AUTO-MCNC: <2 MG/DL
WBC # BLD AUTO: 7.4 X10*3/UL (ref 4.4–11.3)

## 2024-01-16 PROCEDURE — 83021 HEMOGLOBIN CHROMOTOGRAPHY: CPT

## 2024-01-16 PROCEDURE — 86160 COMPLEMENT ANTIGEN: CPT

## 2024-01-16 PROCEDURE — 99214 OFFICE O/P EST MOD 30 MIN: CPT | Performed by: INTERNAL MEDICINE

## 2024-01-16 PROCEDURE — 83020 HEMOGLOBIN ELECTROPHORESIS: CPT | Performed by: PATHOLOGY

## 2024-01-16 PROCEDURE — 86140 C-REACTIVE PROTEIN: CPT

## 2024-01-16 PROCEDURE — 86235 NUCLEAR ANTIGEN ANTIBODY: CPT

## 2024-01-16 PROCEDURE — 81003 URINALYSIS AUTO W/O SCOPE: CPT

## 2024-01-16 PROCEDURE — 85610 PROTHROMBIN TIME: CPT

## 2024-01-16 PROCEDURE — 83615 LACTATE (LD) (LDH) ENZYME: CPT

## 2024-01-16 PROCEDURE — 86038 ANTINUCLEAR ANTIBODIES: CPT

## 2024-01-16 PROCEDURE — 82306 VITAMIN D 25 HYDROXY: CPT

## 2024-01-16 PROCEDURE — 73030 X-RAY EXAM OF SHOULDER: CPT | Mod: 50

## 2024-01-16 PROCEDURE — 82728 ASSAY OF FERRITIN: CPT

## 2024-01-16 PROCEDURE — 3008F BODY MASS INDEX DOCD: CPT | Performed by: INTERNAL MEDICINE

## 2024-01-16 PROCEDURE — 85045 AUTOMATED RETICULOCYTE COUNT: CPT

## 2024-01-16 PROCEDURE — 73030 X-RAY EXAM OF SHOULDER: CPT | Mod: BILATERAL PROCEDURE | Performed by: RADIOLOGY

## 2024-01-16 PROCEDURE — 73562 X-RAY EXAM OF KNEE 3: CPT | Mod: 50

## 2024-01-16 PROCEDURE — 1036F TOBACCO NON-USER: CPT | Performed by: INTERNAL MEDICINE

## 2024-01-16 PROCEDURE — 84075 ASSAY ALKALINE PHOSPHATASE: CPT

## 2024-01-16 PROCEDURE — 85660 RBC SICKLE CELL TEST: CPT

## 2024-01-16 PROCEDURE — 85025 COMPLETE CBC W/AUTO DIFF WBC: CPT

## 2024-01-16 PROCEDURE — 73562 X-RAY EXAM OF KNEE 3: CPT | Mod: BILATERAL PROCEDURE | Performed by: RADIOLOGY

## 2024-01-16 PROCEDURE — 86902 BLOOD TYPE ANTIGEN DONOR EA: CPT

## 2024-01-16 PROCEDURE — 86200 CCP ANTIBODY: CPT

## 2024-01-16 PROCEDURE — 83540 ASSAY OF IRON: CPT

## 2024-01-16 PROCEDURE — 86900 BLOOD TYPING SEROLOGIC ABO: CPT

## 2024-01-16 PROCEDURE — 82330 ASSAY OF CALCIUM: CPT

## 2024-01-16 PROCEDURE — 86431 RHEUMATOID FACTOR QUANT: CPT

## 2024-01-16 PROCEDURE — 86922 COMPATIBILITY TEST ANTIGLOB: CPT

## 2024-01-16 PROCEDURE — 82570 ASSAY OF URINE CREATININE: CPT

## 2024-01-16 PROCEDURE — 84550 ASSAY OF BLOOD/URIC ACID: CPT

## 2024-01-16 RX ORDER — TRAZODONE HYDROCHLORIDE 50 MG/1
50 TABLET ORAL NIGHTLY PRN
Qty: 30 TABLET | Refills: 2 | Status: SHIPPED | OUTPATIENT
Start: 2024-01-16 | End: 2024-03-03 | Stop reason: HOSPADM

## 2024-01-16 RX ORDER — DULOXETIN HYDROCHLORIDE 20 MG/1
40 CAPSULE, DELAYED RELEASE ORAL DAILY
Qty: 60 CAPSULE | Refills: 2 | Status: SHIPPED | OUTPATIENT
Start: 2024-01-16 | End: 2024-04-15 | Stop reason: SDUPTHER

## 2024-01-16 RX ORDER — OXYCODONE HYDROCHLORIDE 10 MG/1
10 TABLET ORAL EVERY 4 HOURS PRN
Qty: 75 TABLET | Refills: 0 | Status: SHIPPED | OUTPATIENT
Start: 2024-01-16 | End: 2024-01-30 | Stop reason: SDUPTHER

## 2024-01-16 ASSESSMENT — PAIN SCALES - GENERAL: PAINLEVEL: 8

## 2024-01-16 ASSESSMENT — ENCOUNTER SYMPTOMS
DEPRESSION: 0
OCCASIONAL FEELINGS OF UNSTEADINESS: 0
LOSS OF SENSATION IN FEET: 0

## 2024-01-16 NOTE — PROGRESS NOTES
Patient ID: Senait Narvaez is a 53 y.o. female.  Referring Physician: No referring provider defined for this encounter.  Primary Care Provider: Eric Wei MD  Visit Type: Follow Up      Subjective    HPI  Senait is not feeling well today, she is in pain all over, she had a fall on Sunday, she felt dizzy before she fell. She has had bouts of dizziness at home with and without position changes. Discuss returning to cardiology visit. Has some numbness in her left leg and had swelling in her right. Will order ultrasound. She feels some improvement with pushing her exchange out a week. She has her exchange on Friday this week, and will be admitted on Thursday.   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 Has been evaluated for BMT but donor was considered to be too high risk.   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   History of acute chest syndrome.   Question of fibromyalgia.   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   Obstructive Sleep and significant nocturnal hemoglobin desaturation (greater than 40% of sleep time with an SaO2 of less than 90%).   Bilateral lower extremity edema, recurrent  SVC syndrome: She was diagnosed with SVC syndrome, she had Bilateral Upper Extremity and Facial Swelling d/t partial filling defect within the SVC and a thrombus of the SVC complicated by bilateral Mediport catheters. Vasc med consulted, rec lifelong coumadin. She was on heparin drip bridge and coumadin was initiated on 1/16. INR 3.0 on 1/29/2019. She is status post Venogram, SVC balloon angioplasty and Right mediport removal (1/15/20) and status post placement of left IJ temporary  apharesis catheter, SVC angiogram, Balloon angioplasty of SVC/left brachiocephalic vein, Removal of left sided Mediport catheter (1/21/20). She was given IV diuretics lasix 2/26-29 for UE edema 2/2 SVC syndrome with edema. Breast remain swollen recommended breast binder. SVC in Nov 2022 with stent placed.   Syncopal episodes - referred to Neuro   11/22 hospitalization for SVC stricture; s/p SVC angioplasty, which was successful.  FUV in IR 12/9/22.   Bilateral Upper Extremity and Facial Swelling d/t partial filling defect within the SVC and a thrombus of the SVC complicated by bilateral Mediport catheters.   Balloon removed via IR on April 2023.   SVT event with conversion with 1 dose 6mg of adenosine on 5/5/23  Admission June 2023 underwent RHC/LHC on 6/16 with mPA pressure 36, wedge 14, CI 4.2 and her coronary angiogram revealed no angiographic evidence of obstructive CAD. Dx of pulm HTN.   16. Positive ISABELLE with reflex- upcoming rheumatology appt     Review of Systems   Constitutional:  Positive for appetite change and fatigue.   Eyes: Negative.    Cardiovascular: Negative.    Gastrointestinal:     Endocrine: Positive for hot flashes.   Genitourinary: Negative.     Neurological:  Positive for dizziness. Has some numbness in her left leg   Hematological: Negative.    Psychiatric/Behavioral: Negative.          Objective   BSA: There is no height or weight on file to calculate BSA.  There were no vitals taken for this visit.     has a past medical history of Asthma, CHF (congestive heart failure) (CMS/[facility]), Chronic pain disorder, Compression of vein (02/19/2020), and Hypotension, unspecified (12/10/2020).   has a past surgical history that includes Appendectomy (08/05/2013); Cholecystectomy (08/05/2013); Other surgical history (05/13/2014); Other surgical history (10/09/2014); Other surgical history (06/09/2014); MR angio head wo IV contrast (8/16/2014); MR angio neck wo IV contrast (8/16/2014); IR CVC tunneled  (3/13/2015); IR CVC tunneled (1/29/2016); IR CVC tunneled (1/17/2018); IR CVC tunneled (2/22/2018); IR CVC tunneled (3/22/2018); IR CVC tunneled (4/18/2018); IR CVC tunneled (5/16/2018); IR CVC tunneled (6/12/2018); US guided biopsy lymph node superficial (9/17/2019); CT guided percutaneous biopsy LYMPH node superficial (9/19/2019); IR CVC tunneled (1/21/2020); IR CVC tunneled (2/28/2020); IR CVC tunneled (4/10/2020); IR CVC tunneled (5/22/2020); IR CVC tunneled (6/19/2020); IR CVC tunneled (7/23/2020); IR CVC tunneled (9/4/2020); IR CVC tunneled (10/9/2020); IR CVC tunneled (11/13/2020); IR CVC tunneled (12/18/2020); IR CVC tunneled (1/22/2021); IR CVC tunneled (2/26/2021); IR CVC tunneled (4/2/2021); IR CVC tunneled (5/7/2021); IR CVC tunneled (6/11/2021); IR CVC tunneled (7/16/2021); IR CVC tunneled (8/20/2021); IR CVC tunneled (9/24/2021); IR CVC tunneled (10/29/2021); IR CVC tunneled (12/3/2021); IR CVC tunneled (1/7/2022); IR CVC tunneled (2/23/2022); IR CVC tunneled (3/25/2022); IR CVC tunneled (4/22/2022); IR CVC tunneled (6/3/2022); IR CVC tunneled (9/18/2017); IR CVC tunneled (10/30/2017); IR CVC tunneled (12/19/2017); IR CVC tunneled (1/6/2023); IR CVC tunneled (2/24/2023); IR CVC tunneled (7/8/2022); IR CVC tunneled (8/12/2022); IR CVC tunneled (9/16/2022); CT angio neck w and wo IV contrast (10/19/2022); IR CVC tunneled (10/20/2022); IR CVC tunneled (11/29/2022); IR CVC tunneled (3/31/2023); IR CVC tunneled (6/9/2023); IR CVC tunneled (7/20/2023); IR CVC tunneled (8/16/2023); and IR CVC tunneled (9/21/2023).  Family History   Problem Relation Name Age of Onset    Diabetes Father      Gout Father      Sickle cell trait Father      Seizures Sister      Diabetes Other      Uterine cancer Other      Other (congenital blindness) Cousin     Grandfather had RA   Oncology History    No history exists.       Senait Narvaez  reports that she has quit smoking. Her smoking use included cigarettes. She has been exposed  to tobacco smoke. She has never used smokeless tobacco.  She  reports that she does not currently use alcohol.  She  reports that she does not currently use drugs.    Physical Exam  Constitutional:       Appearance: She is ill-appearing.   HENT:      Head: Normocephalic.      Right Ear: Tympanic membrane normal.      Left Ear: Tympanic membrane normal.      Nose: Nose normal.   Eyes:      Conjunctiva/sclera: Conjunctivae normal.   Cardiovascular:      Pulses: Normal pulses.      Heart sounds: Normal heart sounds.   Pulmonary:      Effort: Pulmonary effort is normal.      Breath sounds: Normal breath sounds.   Abdominal:      General: Bowel sounds are normal.   Musculoskeletal:         General: Tenderness present.      Cervical back: Normal range of motion.   Skin:     General: Skin is warm and dry.   Neurological:      Mental Status: She is oriented to person, place, and time.   Psychiatric:         Mood and Affect: Mood normal.         WBC   Date/Time Value Ref Range Status   09/23/2023 08:51 AM 9.2 4.4 - 11.3 x10E9/L Final   09/22/2023 08:58 AM 8.0 4.4 - 11.3 x10E9/L Final   09/22/2023 05:24 AM CANCELED       Comment:     Result canceled by the ancillary.     nRBC   Date Value Ref Range Status   09/23/2023 5.5 0.0 - 0.0 /100 WBC Final   09/22/2023 4.0 0.0 - 0.0 /100 WBC Final   09/22/2023 CANCELED       Comment:     Result canceled by the ancillary.     RBC   Date Value Ref Range Status   09/23/2023 3.17 (L) 4.00 - 5.20 x10E12/L Final   09/22/2023 3.20 (L) 4.00 - 5.20 x10E12/L Final   09/22/2023 CANCELED       Comment:     Result canceled by the ancillary.     Hemoglobin   Date Value Ref Range Status   09/23/2023 8.2 (L) 12.0 - 16.0 g/dL Final   09/22/2023 8.7 (L) 12.0 - 16.0 g/dL Final   09/22/2023 CANCELED       Comment:     Result canceled by the ancillary.     Hematocrit   Date Value Ref Range Status   09/23/2023 24.4 (L) 36.0 - 46.0 % Final   09/22/2023 25.3 (L) 36.0 - 46.0 % Final   09/22/2023 CANCELED        Comment:     Result canceled by the ancillary.     MCV   Date/Time Value Ref Range Status   09/23/2023 08:51 AM 77 (L) 80 - 100 fL Final   09/22/2023 08:58 AM 79 (L) 80 - 100 fL Final   09/22/2023 05:24 AM CANCELED       Comment:     Result canceled by the ancillary.     MCH   Date/Time Value Ref Range Status   08/12/2023 03:07 PM 23.7 (L) 26 - 34 PG Final     MCHC   Date/Time Value Ref Range Status   09/23/2023 08:51 AM 33.6 32.0 - 36.0 g/dL Final   09/22/2023 08:58 AM 34.4 32.0 - 36.0 g/dL Final   09/22/2023 05:24 AM CANCELED       Comment:     Result canceled by the ancillary.     RDW   Date/Time Value Ref Range Status   09/23/2023 08:51 AM 26.3 (H) 11.5 - 14.5 % Final   09/22/2023 08:58 AM 26.4 (H) 11.5 - 14.5 % Final   09/22/2023 05:24 AM CANCELED       Comment:     Result canceled by the ancillary.     Platelets   Date/Time Value Ref Range Status   09/23/2023 08:51  150 - 450 x10E9/L Final   09/22/2023 08:58  150 - 450 x10E9/L Final   09/22/2023 05:24 AM CANCELED       Comment:     Result canceled by the ancillary.     MPV   Date/Time Value Ref Range Status   08/12/2023 03:07 PM 9.4 7.0 - 12.6 CU Final     Neutrophils %   Date/Time Value Ref Range Status   09/22/2023 05:24 AM CANCELED       Comment:     Result canceled by the ancillary.   09/21/2023 07:15 AM CANCELED       Comment:     Result canceled by the ancillary.   09/18/2023 10:04 AM 76.1 40.0 - 80.0 % Final     Immature Granulocytes %, Automated   Date/Time Value Ref Range Status   09/23/2023 08:51 AM 0.3 0.0 - 0.9 % Final     Comment:      Immature Granulocyte Count (IG) includes promyelocytes,    myelocytes and metamyelocytes but does not include bands.   Percent differential counts (%) should be interpreted in the   context of the absolute cell counts (cells/L).     09/22/2023 08:58 AM 0.4 0.0 - 0.9 % Final     Comment:      Immature Granulocyte Count (IG) includes promyelocytes,    myelocytes and metamyelocytes but does not include  bands.   Percent differential counts (%) should be interpreted in the   context of the absolute cell counts (cells/L).     09/22/2023 05:24 AM CANCELED       Comment:      Immature Granulocyte Count (IG) includes promyelocytes,    myelocytes and metamyelocytes but does not include bands.   Percent differential counts (%) should be interpreted in the   context of the absolute cell counts (cells/L).    Result canceled by the ancillary.       Lymphocytes %   Date/Time Value Ref Range Status   09/23/2023 08:51 AM 27.4 13.0 - 44.0 % Final   09/22/2023 08:58 AM 20.9 13.0 - 44.0 % Final   09/22/2023 05:24 AM CANCELED       Comment:     Result canceled by the ancillary.   09/21/2023 01:37 PM 20.0 13.0 - 44.0 % Final     Monocytes %   Date/Time Value Ref Range Status   09/23/2023 08:51 AM 8.5 2.0 - 10.0 % Final   09/22/2023 08:58 AM 4.3 2.0 - 10.0 % Final   09/22/2023 05:24 AM CANCELED       Comment:     Result canceled by the ancillary.   09/21/2023 01:37 PM 2.6 2.0 - 10.0 % Final     Eosinophils %   Date/Time Value Ref Range Status   09/23/2023 08:51 AM 3.4 0.0 - 6.0 % Final   09/22/2023 08:58 AM 4.4 0.0 - 6.0 % Final   09/22/2023 05:24 AM CANCELED       Comment:     Result canceled by the ancillary.   09/21/2023 01:37 PM 0.0 0.0 - 6.0 % Final     Basophils %   Date/Time Value Ref Range Status   09/23/2023 08:51 AM 0.0 0.0 - 2.0 % Final   09/22/2023 08:58 AM 0.0 0.0 - 2.0 % Final   09/22/2023 05:24 AM CANCELED       Comment:     Result canceled by the ancillary.   09/21/2023 01:37 PM 0.0 0.0 - 2.0 % Final     Neutrophils Absolute   Date/Time Value Ref Range Status   09/22/2023 05:24 AM CANCELED       Comment:     Result canceled by the ancillary.   09/21/2023 07:15 AM CANCELED       Comment:     Result canceled by the ancillary.   09/18/2023 10:04 AM 7.46 1.20 - 7.70 x10E9/L Final     Immature Granulocytes Absolute, Automated   Date/Time Value Ref Range Status   08/12/2023 03:07 PM 0.12 (H) 0.0 - 0.1 K/UL Final  "    Lymphocytes Absolute   Date/Time Value Ref Range Status   09/22/2023 05:24 AM CANCELED       Comment:     Result canceled by the ancillary.   09/21/2023 07:15 AM CANCELED       Comment:     Result canceled by the ancillary.   09/18/2023 10:04 AM 1.41 1.20 - 4.80 x10E9/L Final     Monocytes Absolute   Date/Time Value Ref Range Status   09/22/2023 05:24 AM CANCELED       Comment:     Result canceled by the ancillary.   09/21/2023 07:15 AM CANCELED       Comment:     Result canceled by the ancillary.   09/18/2023 10:04 AM 0.74 0.10 - 1.00 x10E9/L Final     Eosinophils Absolute   Date/Time Value Ref Range Status   09/23/2023 08:51 AM 0.31 0.00 - 0.70 x10E9/L Final   09/22/2023 08:58 AM 0.35 0.00 - 0.70 x10E9/L Final   09/22/2023 05:24 AM CANCELED       Comment:     Result canceled by the ancillary.   09/21/2023 01:37 PM 0.00 0.00 - 0.70 x10E9/L Final     Basophils Absolute   Date/Time Value Ref Range Status   09/23/2023 08:51 AM 0.00 0.00 - 0.10 x10E9/L Final   09/22/2023 08:58 AM 0.00 0.00 - 0.10 x10E9/L Final   09/22/2023 05:24 AM CANCELED       Comment:     Result canceled by the ancillary.   09/21/2023 01:37 PM 0.00 0.00 - 0.10 x10E9/L Final       No components found for: \"PT\"  aPTT   Date/Time Value Ref Range Status   09/23/2023 08:51 AM 30 27 - 38 sec Final     Comment:     Note new reference range as of 6/20/2023 at 10:00am.   09/22/2023 08:58 AM 29 27 - 38 sec Final     Comment:     Note new reference range as of 6/20/2023 at 10:00am.   09/21/2023 07:15 AM CANCELED       Comment:     Note new reference range as of 6/20/2023 at 10:00am.    Result canceled by the ancillary.         Assessment/Plan        - increase duloxetine to 40mg daily, send new rx  - send oxycodone rx  Next fuv prior to RBCX  - ultrasound ordered for bilateral leg pain/numbness and swelling- right and left leg without evidence of DVT or acute process.   - care plan updated in Dec 2023    Will consider pain management referral- will need " cardiac clearance prior to treatment after rheumatology assessment      Senait Narvaez is a 53 year old with   1. History of Hb SC SCD and chronic pain   - oral tylenol as needed for mild to moderate pain   - oral oxycodone 10 mg every 4-6 hours as needed for moderate to severe and breakthrough pain   - oral duloxetine for chronic pain and aslo anxiety/depression   - reviewed [place]  - discussed non pharmacologic methods of pain control     2. Encounter for DMT with full rbc exchange every 5 weeks and next scheduled on 9/20/23, indication being MSOF and chronic pain. She has very poor venous access and using femoral veins for apheresis   - proceed with planned full exchange as scheduled. This will be performed under telemetry on account of recurrent arrythmia     3. History of recurrent VTE/PE with SVC syndrome. She is on life long anticoagulation with warfarin   - continue oral warfarin 5 mg daily with target INR of 2-3     4.  History of bronchial asthma and seasonal allergies   - as needed albuterol   - continue inhaled nasal steroids   - continue as needed loratidine   - observe cough and if worsens, will give us a call     5. History of insominia   - continue oral trazodone     6. History of CAN on CPAP with 2L of supplemental oxygen    7. Anemia with HB of 11.2 g/dl   - continue daily folic acid     8. Chronic constipation   - colase and miralax as needed     9. History of tachycardia and arrythmia/SVT, well controlled   - metoprolol as prescribed   - follow up with cardiology as scheduled     10. CKD with  eGFR of 75  - follow up with nephrology as scheduled   - avoid nephrotoxic medications     11. Pulmonary hypertension   - follow up with pulmonology as scheduled     12. Rheum: Positive ISABELLE and reflex- referred to rheumatology          Problem List Items Addressed This Visit    None  Visit Diagnoses       Sickle cell disease with crisis (CMS/HCC)    -  Primary    Relevant Orders    Calcium, Ionized    CBC and  Auto Differential    Comprehensive Metabolic Panel    Reticulocytes    Lactate Dehydrogenase    HEMOGLOBIN IDENTIFICATION WITH PATH REVIEW    Ferritin    Blood typing and cross match

## 2024-01-17 ENCOUNTER — ANTICOAGULATION - WARFARIN VISIT (OUTPATIENT)
Dept: CARDIOLOGY | Facility: HOSPITAL | Age: 54
End: 2024-01-17
Payer: COMMERCIAL

## 2024-01-17 DIAGNOSIS — I82.210 THROMBOSIS OF SUPERIOR VENA CAVA (MULTI): ICD-10-CM

## 2024-01-17 DIAGNOSIS — I82.4Y9 DEEP VEIN THROMBOSIS (DVT) OF PROXIMAL LOWER EXTREMITY, UNSPECIFIED CHRONICITY, UNSPECIFIED LATERALITY (MULTI): ICD-10-CM

## 2024-01-17 DIAGNOSIS — I26.99 RECURRENT PULMONARY EMBOLISM (MULTI): ICD-10-CM

## 2024-01-17 LAB
ANA PATTERN: ABNORMAL
ANA SER QL HEP2 SUBST: POSITIVE
ANA TITR SER IF: ABNORMAL {TITER}
CENTROMERE B AB SER-ACNC: <0.2 AI
CHROMATIN AB SERPL-ACNC: <0.2 AI
DSDNA AB SER-ACNC: 21 IU/ML
ENA JO1 AB SER QL IA: <0.2 AI
ENA RNP AB SER IA-ACNC: <0.2 AI
ENA SCL70 AB SER QL IA: <0.2 AI
ENA SM AB SER IA-ACNC: <0.2 AI
ENA SM+RNP AB SER QL IA: <0.2 AI
ENA SS-A AB SER IA-ACNC: <0.2 AI
ENA SS-B AB SER IA-ACNC: <0.2 AI
HEMOGLOBIN A2: 3.1 % (ref 2–3.5)
HEMOGLOBIN A: 39.7 % (ref 95.8–98)
HEMOGLOBIN C: 28.1 %
HEMOGLOBIN F: 0.4 % (ref 0–2)
HEMOGLOBIN IDENTIFICATION INTERPRETATION: ABNORMAL
HEMOGLOBIN S: 28.7 %
PATH REVIEW-HGB IDENTIFICATION: ABNORMAL
RIBOSOMAL P AB SER-ACNC: <0.2 AI

## 2024-01-18 ENCOUNTER — ANTICOAGULATION - WARFARIN VISIT (OUTPATIENT)
Dept: CARDIOLOGY | Facility: CLINIC | Age: 54
End: 2024-01-18
Payer: COMMERCIAL

## 2024-01-18 ENCOUNTER — HOSPITAL ENCOUNTER (INPATIENT)
Facility: HOSPITAL | Age: 54
LOS: 6 days | Discharge: HOME | DRG: 812 | End: 2024-01-24
Attending: INTERNAL MEDICINE
Payer: COMMERCIAL

## 2024-01-18 DIAGNOSIS — R60.0 LOCALIZED EDEMA: ICD-10-CM

## 2024-01-18 DIAGNOSIS — R93.1 ABNORMAL ECHOCARDIOGRAM: ICD-10-CM

## 2024-01-18 DIAGNOSIS — R94.30 ELEVATED LEFT VENTRICULAR END-DIASTOLIC PRESSURE (LVEDP): ICD-10-CM

## 2024-01-18 DIAGNOSIS — R22.0 SWELLING OF FACE: ICD-10-CM

## 2024-01-18 DIAGNOSIS — D57.1 SICKLE CELL ANEMIA WITHOUT CRISIS (MULTI): ICD-10-CM

## 2024-01-18 DIAGNOSIS — I82.210 THROMBOSIS OF SUPERIOR VENA CAVA (MULTI): ICD-10-CM

## 2024-01-18 DIAGNOSIS — R93.1 ABNORMAL FINDINGS ON CARDIAC CATHETERIZATION: ICD-10-CM

## 2024-01-18 DIAGNOSIS — I11.9 HYPERTENSIVE HEART DISEASE WITHOUT HEART FAILURE: ICD-10-CM

## 2024-01-18 DIAGNOSIS — D57.1 HB-SS DISEASE WITHOUT CRISIS (MULTI): ICD-10-CM

## 2024-01-18 DIAGNOSIS — I87.1 SVC SYNDROME: ICD-10-CM

## 2024-01-18 DIAGNOSIS — D57.00 SICKLE CELL CRISIS (MULTI): ICD-10-CM

## 2024-01-18 DIAGNOSIS — I82.4Y9 DEEP VEIN THROMBOSIS (DVT) OF PROXIMAL LOWER EXTREMITY, UNSPECIFIED CHRONICITY, UNSPECIFIED LATERALITY (MULTI): ICD-10-CM

## 2024-01-18 DIAGNOSIS — R06.02 SHORTNESS OF BREATH: ICD-10-CM

## 2024-01-18 DIAGNOSIS — I26.99 RECURRENT PULMONARY EMBOLISM (MULTI): ICD-10-CM

## 2024-01-18 DIAGNOSIS — I47.10 PAROXYSMAL SUPRAVENTRICULAR TACHYCARDIA (CMS-HCC): ICD-10-CM

## 2024-01-18 DIAGNOSIS — M79.89 SWELLING OF UPPER EXTREMITY: ICD-10-CM

## 2024-01-18 DIAGNOSIS — M79.89 SWELLING OF RIGHT LOWER EXTREMITY: Primary | ICD-10-CM

## 2024-01-18 LAB
ACANTHOCYTES BLD QL SMEAR: ABNORMAL
ALBUMIN SERPL BCP-MCNC: 3.8 G/DL (ref 3.4–5)
ALP SERPL-CCNC: 101 U/L (ref 33–110)
ALT SERPL W P-5'-P-CCNC: 5 U/L (ref 7–45)
AMPHETAMINES UR QL SCN: ABNORMAL
ANION GAP SERPL CALC-SCNC: 11 MMOL/L (ref 10–20)
APTT PPP: 44 SECONDS (ref 27–38)
AST SERPL W P-5'-P-CCNC: 17 U/L (ref 9–39)
BARBITURATES UR QL SCN: ABNORMAL
BASOPHILS # BLD MANUAL: 0 X10*3/UL (ref 0–0.1)
BASOPHILS NFR BLD MANUAL: 0 %
BILIRUB SERPL-MCNC: 1 MG/DL (ref 0–1.2)
BUN SERPL-MCNC: 17 MG/DL (ref 6–23)
BURR CELLS BLD QL SMEAR: ABNORMAL
BZE UR QL SCN: ABNORMAL
CA-I BLD-SCNC: 1.06 MMOL/L (ref 1.1–1.33)
CALCIUM SERPL-MCNC: 9.3 MG/DL (ref 8.6–10.6)
CANNABINOIDS UR QL SCN: ABNORMAL
CHLORIDE SERPL-SCNC: 104 MMOL/L (ref 98–107)
CO2 SERPL-SCNC: 25 MMOL/L (ref 21–32)
CREAT SERPL-MCNC: 1.17 MG/DL (ref 0.5–1.05)
CREAT UR-MCNC: 172.8 MG/DL (ref 20–320)
EGFRCR SERPLBLD CKD-EPI 2021: 56 ML/MIN/1.73M*2
EOSINOPHIL # BLD MANUAL: 0 X10*3/UL (ref 0–0.7)
EOSINOPHIL NFR BLD MANUAL: 0 %
ERYTHROCYTE [DISTWIDTH] IN BLOOD BY AUTOMATED COUNT: 27.1 % (ref 11.5–14.5)
GLUCOSE SERPL-MCNC: 102 MG/DL (ref 74–99)
HCT VFR BLD AUTO: 31.9 % (ref 36–46)
HGB BLD-MCNC: 9.8 G/DL (ref 12–16)
HGB RETIC QN: 17 PG (ref 28–38)
HYPOCHROMIA BLD QL SMEAR: ABNORMAL
IMM GRANULOCYTES # BLD AUTO: 0.03 X10*3/UL (ref 0–0.7)
IMM GRANULOCYTES NFR BLD AUTO: 0.4 % (ref 0–0.9)
IMMATURE RETIC FRACTION: 41.9 %
INR PPP: 3.3 (ref 0.9–1.1)
LDH SERPL L TO P-CCNC: 247 U/L (ref 84–246)
LYMPHOCYTES # BLD MANUAL: 1.02 X10*3/UL (ref 1.2–4.8)
LYMPHOCYTES NFR BLD MANUAL: 13.8 %
MCH RBC QN AUTO: 22.6 PG (ref 26–34)
MCHC RBC AUTO-ENTMCNC: 30.7 G/DL (ref 32–36)
MCV RBC AUTO: 74 FL (ref 80–100)
MONOCYTES # BLD MANUAL: 0.38 X10*3/UL (ref 0.1–1)
MONOCYTES NFR BLD MANUAL: 5.2 %
NEUTS SEG # BLD MANUAL: 5.99 X10*3/UL (ref 1.2–7)
NEUTS SEG NFR BLD MANUAL: 81 %
NRBC BLD-RTO: 13.9 /100 WBCS (ref 0–0)
PCP UR QL SCN: ABNORMAL
PLATELET # BLD AUTO: 296 X10*3/UL (ref 150–450)
POLYCHROMASIA BLD QL SMEAR: ABNORMAL
POTASSIUM SERPL-SCNC: 4.2 MMOL/L (ref 3.5–5.3)
PROT SERPL-MCNC: 6.9 G/DL (ref 6.4–8.2)
PROTHROMBIN TIME: 37.6 SECONDS (ref 9.8–12.8)
RBC # BLD AUTO: 4.34 X10*6/UL (ref 4–5.2)
RBC MORPH BLD: ABNORMAL
RETICS #: 0.01 X10*6/UL (ref 0.02–0.08)
RETICS/RBC NFR AUTO: 0.2 % (ref 0.5–2)
SARS-COV-2 RNA RESP QL NAA+PROBE: NOT DETECTED
SCHISTOCYTES BLD QL SMEAR: ABNORMAL
SODIUM SERPL-SCNC: 136 MMOL/L (ref 136–145)
TARGETS BLD QL SMEAR: ABNORMAL
TOTAL CELLS COUNTED BLD: 116
WBC # BLD AUTO: 7.4 X10*3/UL (ref 4.4–11.3)

## 2024-01-18 PROCEDURE — 80346 BENZODIAZEPINES1-12: CPT

## 2024-01-18 PROCEDURE — 85027 COMPLETE CBC AUTOMATED: CPT

## 2024-01-18 PROCEDURE — 83021 HEMOGLOBIN CHROMOTOGRAPHY: CPT

## 2024-01-18 PROCEDURE — 2500000004 HC RX 250 GENERAL PHARMACY W/ HCPCS (ALT 636 FOR OP/ED)

## 2024-01-18 PROCEDURE — 80307 DRUG TEST PRSMV CHEM ANLYZR: CPT

## 2024-01-18 PROCEDURE — 1200000002 HC GENERAL ROOM WITH TELEMETRY DAILY

## 2024-01-18 PROCEDURE — 93010 ELECTROCARDIOGRAM REPORT: CPT | Performed by: INTERNAL MEDICINE

## 2024-01-18 PROCEDURE — 83615 LACTATE (LD) (LDH) ENZYME: CPT

## 2024-01-18 PROCEDURE — 5A09357 ASSISTANCE WITH RESPIRATORY VENTILATION, LESS THAN 24 CONSECUTIVE HOURS, CONTINUOUS POSITIVE AIRWAY PRESSURE: ICD-10-PCS

## 2024-01-18 PROCEDURE — 2500000001 HC RX 250 WO HCPCS SELF ADMINISTERED DRUGS (ALT 637 FOR MEDICARE OP)

## 2024-01-18 PROCEDURE — 85045 AUTOMATED RETICULOCYTE COUNT: CPT

## 2024-01-18 PROCEDURE — 2500000001 HC RX 250 WO HCPCS SELF ADMINISTERED DRUGS (ALT 637 FOR MEDICARE OP): Performed by: PHYSICIAN ASSISTANT

## 2024-01-18 PROCEDURE — 2500000002 HC RX 250 W HCPCS SELF ADMINISTERED DRUGS (ALT 637 FOR MEDICARE OP, ALT 636 FOR OP/ED)

## 2024-01-18 PROCEDURE — 85007 BL SMEAR W/DIFF WBC COUNT: CPT

## 2024-01-18 PROCEDURE — 2500000005 HC RX 250 GENERAL PHARMACY W/O HCPCS

## 2024-01-18 PROCEDURE — 99223 1ST HOSP IP/OBS HIGH 75: CPT

## 2024-01-18 PROCEDURE — 85610 PROTHROMBIN TIME: CPT

## 2024-01-18 PROCEDURE — 82330 ASSAY OF CALCIUM: CPT

## 2024-01-18 PROCEDURE — 83020 HEMOGLOBIN ELECTROPHORESIS: CPT

## 2024-01-18 PROCEDURE — 84075 ASSAY ALKALINE PHOSPHATASE: CPT

## 2024-01-18 PROCEDURE — 87635 SARS-COV-2 COVID-19 AMP PRB: CPT

## 2024-01-18 PROCEDURE — 80349 CANNABINOIDS NATURAL: CPT

## 2024-01-18 RX ORDER — POLYETHYLENE GLYCOL 3350 17 G/17G
17 POWDER, FOR SOLUTION ORAL 2 TIMES DAILY PRN
Status: DISCONTINUED | OUTPATIENT
Start: 2024-01-18 | End: 2024-01-21

## 2024-01-18 RX ORDER — FLUTICASONE PROPIONATE 50 MCG
2 SPRAY, SUSPENSION (ML) NASAL DAILY PRN
Status: DISCONTINUED | OUTPATIENT
Start: 2024-01-18 | End: 2024-01-24 | Stop reason: HOSPADM

## 2024-01-18 RX ORDER — DIPHENHYDRAMINE HCL 25 MG
25 CAPSULE ORAL EVERY 6 HOURS PRN
Status: DISCONTINUED | OUTPATIENT
Start: 2024-01-18 | End: 2024-01-24 | Stop reason: HOSPADM

## 2024-01-18 RX ORDER — DIPHENHYDRAMINE HYDROCHLORIDE 50 MG/ML
25 INJECTION INTRAMUSCULAR; INTRAVENOUS EVERY 5 MIN PRN
Status: DISCONTINUED | OUTPATIENT
Start: 2024-01-19 | End: 2024-01-19 | Stop reason: HOSPADM

## 2024-01-18 RX ORDER — TRAZODONE HYDROCHLORIDE 50 MG/1
50 TABLET ORAL NIGHTLY PRN
Status: DISCONTINUED | OUTPATIENT
Start: 2024-01-18 | End: 2024-01-24 | Stop reason: HOSPADM

## 2024-01-18 RX ORDER — DOCUSATE SODIUM 100 MG/1
100 CAPSULE, LIQUID FILLED ORAL 2 TIMES DAILY PRN
Status: DISCONTINUED | OUTPATIENT
Start: 2024-01-18 | End: 2024-01-21

## 2024-01-18 RX ORDER — WARFARIN SODIUM 5 MG/1
5 TABLET ORAL DAILY
Status: DISCONTINUED | OUTPATIENT
Start: 2024-01-18 | End: 2024-01-24 | Stop reason: HOSPADM

## 2024-01-18 RX ORDER — ASCORBIC ACID 500 MG
500 TABLET ORAL DAILY
Status: DISCONTINUED | OUTPATIENT
Start: 2024-01-18 | End: 2024-01-24 | Stop reason: HOSPADM

## 2024-01-18 RX ORDER — FUROSEMIDE 20 MG/1
20 TABLET ORAL EVERY OTHER DAY
Status: DISCONTINUED | OUTPATIENT
Start: 2024-01-19 | End: 2024-01-24 | Stop reason: HOSPADM

## 2024-01-18 RX ORDER — HEPARIN SODIUM 1000 [USP'U]/ML
1000 INJECTION, SOLUTION INTRAVENOUS; SUBCUTANEOUS ONCE
Status: COMPLETED | OUTPATIENT
Start: 2024-01-19 | End: 2024-01-19

## 2024-01-18 RX ORDER — ONDANSETRON 4 MG/1
4 TABLET, FILM COATED ORAL EVERY 8 HOURS PRN
Status: DISCONTINUED | OUTPATIENT
Start: 2024-01-18 | End: 2024-01-24 | Stop reason: HOSPADM

## 2024-01-18 RX ORDER — DULOXETIN HYDROCHLORIDE 20 MG/1
20 CAPSULE, DELAYED RELEASE ORAL DAILY
Status: DISCONTINUED | OUTPATIENT
Start: 2024-01-18 | End: 2024-01-18

## 2024-01-18 RX ORDER — MULTIVIT-MIN/IRON FUM/FOLIC AC 7.5 MG-4
1 TABLET ORAL DAILY
Status: DISCONTINUED | OUTPATIENT
Start: 2024-01-18 | End: 2024-01-24 | Stop reason: HOSPADM

## 2024-01-18 RX ORDER — HYDROMORPHONE HYDROCHLORIDE 1 MG/ML
1.5 INJECTION, SOLUTION INTRAMUSCULAR; INTRAVENOUS; SUBCUTANEOUS
Status: DISCONTINUED | OUTPATIENT
Start: 2024-01-18 | End: 2024-01-19

## 2024-01-18 RX ORDER — METOPROLOL TARTRATE 25 MG/1
25 TABLET, FILM COATED ORAL 2 TIMES DAILY
Status: DISCONTINUED | OUTPATIENT
Start: 2024-01-18 | End: 2024-01-20

## 2024-01-18 RX ORDER — FOLIC ACID 1 MG/1
1 TABLET ORAL DAILY
Status: DISCONTINUED | OUTPATIENT
Start: 2024-01-18 | End: 2024-01-24 | Stop reason: HOSPADM

## 2024-01-18 RX ORDER — CYCLOBENZAPRINE HCL 10 MG
10 TABLET ORAL 3 TIMES DAILY PRN
Status: DISCONTINUED | OUTPATIENT
Start: 2024-01-18 | End: 2024-01-24 | Stop reason: HOSPADM

## 2024-01-18 RX ORDER — OXYCODONE HYDROCHLORIDE 5 MG/1
10 TABLET ORAL EVERY 4 HOURS PRN
Status: DISCONTINUED | OUTPATIENT
Start: 2024-01-18 | End: 2024-01-18

## 2024-01-18 RX ORDER — LORATADINE 10 MG/1
10 TABLET ORAL DAILY PRN
Status: DISCONTINUED | OUTPATIENT
Start: 2024-01-18 | End: 2024-01-24 | Stop reason: HOSPADM

## 2024-01-18 RX ORDER — DULOXETIN HYDROCHLORIDE 20 MG/1
40 CAPSULE, DELAYED RELEASE ORAL NIGHTLY
Status: DISCONTINUED | OUTPATIENT
Start: 2024-01-18 | End: 2024-01-24 | Stop reason: HOSPADM

## 2024-01-18 RX ORDER — DEXTROSE MONOHYDRATE AND SODIUM CHLORIDE 5; .45 G/100ML; G/100ML
50 INJECTION, SOLUTION INTRAVENOUS CONTINUOUS
Status: ACTIVE | OUTPATIENT
Start: 2024-01-18 | End: 2024-01-19

## 2024-01-18 RX ORDER — CALCIUM CARBONATE 200(500)MG
1500 TABLET,CHEWABLE ORAL EVERY 5 MIN PRN
Status: DISCONTINUED | OUTPATIENT
Start: 2024-01-19 | End: 2024-01-19 | Stop reason: HOSPADM

## 2024-01-18 RX ORDER — ACETAMINOPHEN 325 MG/1
650 TABLET ORAL ONCE
Status: COMPLETED | OUTPATIENT
Start: 2024-01-19 | End: 2024-01-19

## 2024-01-18 RX ORDER — DIPHENHYDRAMINE HCL 25 MG
25 CAPSULE ORAL ONCE
Status: COMPLETED | OUTPATIENT
Start: 2024-01-19 | End: 2024-01-19

## 2024-01-18 RX ORDER — ALBUTEROL SULFATE 90 UG/1
2 AEROSOL, METERED RESPIRATORY (INHALATION) EVERY 6 HOURS PRN
Status: DISCONTINUED | OUTPATIENT
Start: 2024-01-18 | End: 2024-01-24 | Stop reason: HOSPADM

## 2024-01-18 RX ADMIN — HYDROMORPHONE HYDROCHLORIDE 1.5 MG: 1 INJECTION, SOLUTION INTRAMUSCULAR; INTRAVENOUS; SUBCUTANEOUS at 15:49

## 2024-01-18 RX ADMIN — LORATADINE 10 MG: 10 TABLET ORAL at 13:12

## 2024-01-18 RX ADMIN — CYCLOBENZAPRINE 10 MG: 10 TABLET, FILM COATED ORAL at 13:12

## 2024-01-18 RX ADMIN — HYDROMORPHONE HYDROCHLORIDE 1.5 MG: 1 INJECTION, SOLUTION INTRAMUSCULAR; INTRAVENOUS; SUBCUTANEOUS at 18:07

## 2024-01-18 RX ADMIN — Medication 1 TABLET: at 13:12

## 2024-01-18 RX ADMIN — METOPROLOL TARTRATE 25 MG: 25 TABLET, FILM COATED ORAL at 21:44

## 2024-01-18 RX ADMIN — DIPHENHYDRAMINE HYDROCHLORIDE 25 MG: 25 CAPSULE ORAL at 22:23

## 2024-01-18 RX ADMIN — METOPROLOL TARTRATE 25 MG: 25 TABLET, FILM COATED ORAL at 13:12

## 2024-01-18 RX ADMIN — OXYCODONE HYDROCHLORIDE AND ACETAMINOPHEN 500 MG: 500 TABLET ORAL at 13:12

## 2024-01-18 RX ADMIN — DOCUSATE SODIUM 100 MG: 100 CAPSULE, LIQUID FILLED ORAL at 13:12

## 2024-01-18 RX ADMIN — DEXTROSE AND SODIUM CHLORIDE 50 ML/HR: 5; 450 INJECTION, SOLUTION INTRAVENOUS at 16:55

## 2024-01-18 RX ADMIN — FOLIC ACID 1 MG: 1 TABLET ORAL at 13:12

## 2024-01-18 RX ADMIN — FLUTICASONE PROPIONATE 2 SPRAY: 50 SPRAY, METERED NASAL at 13:12

## 2024-01-18 RX ADMIN — HYDROMORPHONE HYDROCHLORIDE 1.5 MG: 1 INJECTION, SOLUTION INTRAMUSCULAR; INTRAVENOUS; SUBCUTANEOUS at 21:44

## 2024-01-18 RX ADMIN — WARFARIN SODIUM 5 MG: 5 TABLET ORAL at 18:12

## 2024-01-18 RX ADMIN — DULOXETINE HYDROCHLORIDE 40 MG: 20 CAPSULE, DELAYED RELEASE ORAL at 21:44

## 2024-01-18 RX ADMIN — OXYCODONE HYDROCHLORIDE 10 MG: 5 TABLET ORAL at 13:12

## 2024-01-18 RX ADMIN — Medication: at 16:36

## 2024-01-18 SDOH — SOCIAL STABILITY: SOCIAL INSECURITY: HAS ANYONE EVER THREATENED TO HURT YOUR FAMILY OR YOUR PETS?: NO

## 2024-01-18 SDOH — SOCIAL STABILITY: SOCIAL INSECURITY: WERE YOU ABLE TO COMPLETE ALL THE BEHAVIORAL HEALTH SCREENINGS?: YES

## 2024-01-18 SDOH — SOCIAL STABILITY: SOCIAL INSECURITY: DOES ANYONE TRY TO KEEP YOU FROM HAVING/CONTACTING OTHER FRIENDS OR DOING THINGS OUTSIDE YOUR HOME?: NO

## 2024-01-18 SDOH — SOCIAL STABILITY: SOCIAL INSECURITY: DO YOU FEEL UNSAFE GOING BACK TO THE PLACE WHERE YOU ARE LIVING?: NO

## 2024-01-18 SDOH — SOCIAL STABILITY: SOCIAL INSECURITY: ARE THERE ANY APPARENT SIGNS OF INJURIES/BEHAVIORS THAT COULD BE RELATED TO ABUSE/NEGLECT?: NO

## 2024-01-18 SDOH — SOCIAL STABILITY: SOCIAL INSECURITY: HAVE YOU HAD THOUGHTS OF HARMING ANYONE ELSE?: NO

## 2024-01-18 SDOH — SOCIAL STABILITY: SOCIAL INSECURITY: ABUSE: ADULT

## 2024-01-18 SDOH — SOCIAL STABILITY: SOCIAL INSECURITY: DO YOU FEEL ANYONE HAS EXPLOITED OR TAKEN ADVANTAGE OF YOU FINANCIALLY OR OF YOUR PERSONAL PROPERTY?: NO

## 2024-01-18 SDOH — SOCIAL STABILITY: SOCIAL INSECURITY: ARE YOU OR HAVE YOU BEEN THREATENED OR ABUSED PHYSICALLY, EMOTIONALLY, OR SEXUALLY BY ANYONE?: NO

## 2024-01-18 ASSESSMENT — COGNITIVE AND FUNCTIONAL STATUS - GENERAL
DAILY ACTIVITIY SCORE: 24
PATIENT BASELINE BEDBOUND: NO
MOBILITY SCORE: 24
MOBILITY SCORE: 24
DAILY ACTIVITIY SCORE: 24

## 2024-01-18 ASSESSMENT — ACTIVITIES OF DAILY LIVING (ADL)
WALKS IN HOME: INDEPENDENT
DRESSING YOURSELF: INDEPENDENT
BATHING: INDEPENDENT
FEEDING YOURSELF: INDEPENDENT
TOILETING: INDEPENDENT
ADEQUATE_TO_COMPLETE_ADL: YES
HEARING - LEFT EAR: FUNCTIONAL
LACK_OF_TRANSPORTATION: NO
JUDGMENT_ADEQUATE_SAFELY_COMPLETE_DAILY_ACTIVITIES: YES
HEARING - RIGHT EAR: FUNCTIONAL
PATIENT'S MEMORY ADEQUATE TO SAFELY COMPLETE DAILY ACTIVITIES?: YES
GROOMING: INDEPENDENT

## 2024-01-18 ASSESSMENT — PAIN DESCRIPTION - ORIENTATION
ORIENTATION: RIGHT;LEFT
ORIENTATION: RIGHT;LEFT

## 2024-01-18 ASSESSMENT — PAIN SCALES - GENERAL
PAINLEVEL_OUTOF10: 9
PAINLEVEL_OUTOF10: 6
PAINLEVEL_OUTOF10: 8
PAINLEVEL_OUTOF10: 7
PAINLEVEL_OUTOF10: 9

## 2024-01-18 ASSESSMENT — PAIN - FUNCTIONAL ASSESSMENT
PAIN_FUNCTIONAL_ASSESSMENT: 0-10

## 2024-01-18 ASSESSMENT — COLUMBIA-SUICIDE SEVERITY RATING SCALE - C-SSRS
2. HAVE YOU ACTUALLY HAD ANY THOUGHTS OF KILLING YOURSELF?: NO
1. IN THE PAST MONTH, HAVE YOU WISHED YOU WERE DEAD OR WISHED YOU COULD GO TO SLEEP AND NOT WAKE UP?: NO
6. HAVE YOU EVER DONE ANYTHING, STARTED TO DO ANYTHING, OR PREPARED TO DO ANYTHING TO END YOUR LIFE?: NO

## 2024-01-18 ASSESSMENT — PAIN DESCRIPTION - LOCATION
LOCATION: GENERALIZED
LOCATION: SHOULDER
LOCATION: SHOULDER

## 2024-01-18 ASSESSMENT — LIFESTYLE VARIABLES
HOW OFTEN DO YOU HAVE A DRINK CONTAINING ALCOHOL: NEVER
SKIP TO QUESTIONS 9-10: 1
HOW OFTEN DO YOU HAVE 6 OR MORE DRINKS ON ONE OCCASION: NEVER
HOW MANY STANDARD DRINKS CONTAINING ALCOHOL DO YOU HAVE ON A TYPICAL DAY: PATIENT DOES NOT DRINK
AUDIT-C TOTAL SCORE: 0
AUDIT-C TOTAL SCORE: 0

## 2024-01-18 NOTE — H&P
History Of Present Illness  Senait Narvaez is a 53 y.o. 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 Patient follows with  sickle cell clinic and has had complications with recent outpatient exchanges, including SVT following line placement requiring admission and hypoxia/lethargy following exchange (found to have new pHTN). She is being admitted for apheresis line placement followed by routine inpatient RBCEx and monitoring.     Patient seen on arrival. Patient states she is having pain in her legs, back and her shoulders. She also stated that she recently had a fall at home on 1/14 in her bathroom. She stated she got up to go to the bathroom in the middle of the night, felt dizzy, walked only a few steps to the bathroom and does not remember falling, just waking up in the bathtub after falling. She claims she did not hit her head. Denies any HA, fevers/chills, dizziness/lightheadedness, cough, congestion, rhinorrhea, sore throat, SOB, CP, palpitations, abdominal pain, n/v/d/c, melena, hematuria, dysuria, urgency/frequency, numbness/tingling, bruising/bleeding, or rashes. Denies any sick contacts or known COVID-19 exposure.  .     Past Medical History  She has a past medical history of Asthma, CHF (congestive heart failure) (CMS/HCC), Chronic pain disorder, Compression of vein (02/19/2020), and Hypotension, unspecified (12/10/2020).     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 Has been evaluated for BMT but donor was considered to be too high risk.   - Recurrent DVT/PE with lifelong anticoagulation on coumadin  -  Cauda equina with saddle numbness, history of bowel/bladder incontinence  - Chronic right hip pain, attributed radiographically to early AVN (sclerosis), with bilateral radicular sxs x 1-2 months  - History of acute chest syndrome  - Question of fibromyalgia  - History of retinal detachment, likely secondary to sickle cell disease   - Obstructive Sleep and significant nocturnal hemoglobin desaturation (greater than 40% of sleep time with an SaO2 of less than 90%)   - Bilateral lower extremity edema, recurrent  - SVC syndrome: She was diagnosed with SVC syndrome, she had Bilateral Upper Extremity and Facial Swelling d/t partial filling defect within the SVC and a thrombus of the SVC complicated by bilateral Mediport catheters. Vasc med consulted, rec lifelong coumadin. She was on heparin drip bridge and coumadin was initiated on 1/16. INR 3.0 on 1/29/2019. - She is status post Venogram, SVC balloon angioplasty and Right mediport removal (1/15/20) and status post placement of left IJ temporary apharesis catheter, SVC angiogram, Balloon angioplasty of SVC/left brachiocephalic vein, Removal of left sided Mediport catheter (1/21/20). She was given IV diuretics lasix 2/26-29 for UE edema 2/2 SVC syndrome with edema. - Breast remain swollen recommended breast binder. SVC in Nov 2022 with stent placed.   - Syncopal episodes - referred to Neuro   - 11/22 hospitalization for SVC stricture; s/p SVC angioplasty, which was successful. FUV in IR 12/9/22. Bilateral Upper Extremity and Facial Swelling d/t partial filling defect within the SVC and a thrombus of the SVC complicated by bilateral Mediport catheters.  - Balloon removed via IR on April 2023   - SVT event with conversion with 1 dose 6mg of adenosine on 5/5/23  - Admission June 2023 underwent RHC/LHC on 6/16 with mPA pressure 36, wedge 14, CI 4.2 and her coronary angiogram revealed no angiographic evidence of obstructive CAD. Dx of pulm HTN.  - Positive ISABELLE with reflex,  saw rheum on 1/15, workup to r/o inflammatory arthritis     Surgical History  She has a past surgical history that includes Appendectomy (08/05/2013); Cholecystectomy (08/05/2013)     Allergies: NKDA     Social History  She reports that she has quit smoking. Her smoking use included cigarettes. She has been exposed to tobacco smoke. She has never used smokeless tobacco. She reports that she does not currently use alcohol. She reports that she does not currently use drugs.     Home scheduled medications  DULoxetine, 20 mg, oral, Daily  fluticasone, 2 spray, Each Nostril, Daily  folic acid, 1 mg, oral, Daily  furosemide, 20 mg, oral, Every other day  metoprolol tartrate, 25 mg, oral, BID  traZODone, 50 mg, oral, Nightly  warfarin, 5 mg, oral, Daily     PRN medications: acetaminophen, albuterol, docusate sodium, docusate sodium **OR** polyethylene glycol, loratadine, ondansetron        Review of Systems   Constitutional: Negative.    HENT: Negative.     Respiratory: Negative.     Cardiovascular: Negative.    Gastrointestinal:  Negative for abdominal pain, constipation, diarrhea, nausea and vomiting.   Genitourinary:  Negative for difficulty urinating, dysuria, frequency and urgency.   Musculoskeletal: Negative.    Skin:  Negative for rash.   Neurological: Negative.    Psychiatric/Behavioral: Negative.         Last Recorded Vitals  Blood pressure 94/61, pulse 74, temperature 36.7 °C (98.1 °F), temperature source Temporal, resp. rate 18, SpO2 96 %.    Physical Exam  Constitutional:       Appearance: Normal appearance.   HENT:      Head: Normocephalic and atraumatic.      Nose: Nose normal.      Mouth: Mucous membranes are moist.      Pharynx: Oropharynx is clear.   Eyes:      Extraocular Movements: Extraocular movements intact.      Pupils: Pupils are equal, round, and reactive to light.   Cardiovascular:      Rate and Rhythm: Normal rate and regular rhythm.      Pulses: Normal pulses.      Heart sounds: Normal heart  sounds.   Pulmonary:      Effort: Pulmonary effort is normal.      Breath sounds: Normal breath sounds.   Abdominal:      General: Bowel sounds are normal.      Palpations: Abdomen is soft.   Musculoskeletal:         General: Normal range of motion.   Skin:     General: Skin is warm.   Neurological:      General: No focal deficit present.      Mental Status: She is alert and oriented to person, place, and time. Mental status is at baseline.   Psychiatric:         Mood and Affect: Mood normal.         Behavior: Behavior normal.            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 Pt has had complications with past outpatient exchanges including SVT following line placement requiring admission and hypoxia/lethargy following exchange (found to have new pHTN). Patient is now admitted for observation following apheresis line placement and RBCEx. Plan for apheresis line placement and routine RBC exchange 1/19.      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  - Managed through routine RBC exchanges q6 weeks, most recent RBCEx 12/8/23  - OAR[place] reviewed, no aberrant behavior noted  - new care plan from 12/6/23- For mild to moderate pain: Dilaudid 0.5mg IVP q3h as needed, for moderate to severe pain Dilaudid 1- 1.5mg q3h PRN  - not currently in pain crisis, admitted for observation following line/red cell exchange  - For pain management - will start IV dilaudid 1.5mg q3 PRN  - Bowel regimen for opioid-induced constipation   - Continue home Duloxetine 40mg daily, PO Zofran PRN,  Folic Acid 1mg daily, and MVI daily  - Plan for apheresis line placement 1/19 and routine RBCEx 1/19  - Exchange labs 1/18- HgbS 28.4%     # recent episode of dizziness  - may be related to hypotension  - s/p fall at home on 1/14  - will get orthostatic vitals, repeat ECHO, EKG     # recently diagnosed pHTN   - 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 Yi and Dr. 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 On lifelong anticoagulation, DOAC discouraged due to high risk of clotting.   - Continue home Coumadin 5mg daily   - INR 2.2 (1/16)  - Monitors INR with at home Coagcheck device  - Follows with Coumadin clinic outpatient  - Caution with fluids       # CHARLES on CKDII  - Baseline ~ SCr <2. Cr 1.17 on admission  - Thought to be related to hypotension mediated by metoprolol (hx of SVT) and furosemide (hx of heart failure)  - Follows with nephrology outpt - last appointment 1/11     # CAN  - Continue home CPAP   - Continue home Albuterol PRN     # H/O Cauda Equine syndrome  - Follows  Dr. Delacruz as outpatient     # DISPO:  - Full Code, confirmed on admit  - DC home with resumed O2 pending monitoring post red cell exchange   - FUV with sickle cell clinic on discharge        I spent >60 minutes in the professional and overall care of this patient.      Leanna Steven, APRN-CNP

## 2024-01-18 NOTE — PROGRESS NOTES
Pharmacy Medication History Review    Senait Narvaez is a 53 y.o. female admitted for Sickle cell disease with crisis and other complication (CMS/[facility]). Pharmacy reviewed the patient's [id number] medications and allergies for accuracy.    The list below reflects the updated PTA list. Comments regarding how patient may be taking medications differently can be found in the Admit Orders Activity  Prior to Admission Medications   Prescriptions Last Dose Informant Patient Reported? Taking?   DULoxetine (Cymbalta) 20 mg DR capsule  Self, Other No Yes   Sig: Take 2 capsules (40 mg) by mouth once daily. Do not crush or chew.   Patient taking differently: Take 2 capsules (40 mg) by mouth once daily at bedtime. Do not crush or chew.   albuterol 90 mcg/actuation inhaler  Self Yes PRN   Sig: Inhale 2 puffs every 4 hours if needed for shortness of breath or wheezing.   ascorbic acid (Vitamin C) 500 mg chewable tablet 1/17/2024 Self Yes Yes   Sig: Chew 1 tablet (500 mg) once daily.   elderberry fruit 350 mg capsule  Self Yes Yes   Sig: Take 2 capsules by mouth once daily.   fluticasone (Flonase) 50 mcg/actuation nasal spray  Self Yes PRN   Sig: Administer 2 sprays into each nostril if needed.   folic acid (Folvite) 1 mg tablet  Self Yes Yes   Sig: Take 1 tablet (1 mg) by mouth once daily.   furosemide (Lasix) 20 mg tablet 1/17/2024 Self Yes Yes   Sig: Take 1 tablet (20 mg) by mouth every other day.   loratadine (Claritin) 10 mg tablet  Self Yes PRN   Sig: Take 1 tablet (10 mg) by mouth once daily as needed for allergies.   metoprolol tartrate (Lopressor) 25 mg tablet  Self Yes Yes   Sig: Take 1 tablet (25 mg) by mouth 2 times a day.   multivitamin with minerals (multivitamin-iron-folic acid) tablet  Self Yes Yes   Sig: Take 1 tablet by mouth once daily.   naloxone (Narcan) 4 mg/0.1 mL nasal spray  Self No PRN   Sig: Administer 1 spray (4 mg) into affected nostril(s) if needed for opioid reversal. May repeat every 2-3 minutes  if needed, alternating nostrils, until medical assistance becomes available.   ondansetron (Zofran) 4 mg tablet  Self No PRN   Sig: Take 1 tablet (4 mg) by mouth every 8 hours if needed for nausea or vomiting.   oxyCODONE (Roxicodone) 10 mg immediate release tablet  Self No PRN   Sig: Take 1 tablet (10 mg) by mouth every 4 hours if needed for severe pain (7 - 10) (pain).  OARRS last dispensed 1/16/2024 for 12 day supply   oxygen (O2) gas therapy  Self No Yes   Sig: Inhale 1 each continuously.   Patient taking differently: Inhale 1 each continuously. Use at night with CPAP   traZODone (Desyrel) 50 mg tablet  Self No PRN   Sig: Take 1 tablet (50 mg) by mouth as needed at bedtime for sleep. 1/2 - 1 tabs for insomnia   warfarin (Coumadin) 5 mg tablet  Self, Other Yes See comments   Sig: Take 1 tablet (5 mg) by mouth once daily in the evening.  -->Patient states taking daily and filling prescription at [facility]. Per [facility], last dispensed 30 tablets on 8/24/23 for 30 day supply.      Facility-Administered Medications: None        The list below reflects the updated allergy list. Please review each documented allergy for additional clarification and justification.  Allergies  Reviewed by Suzanna Abbott PharmD on 1/18/2024   No Known Allergies         Patient declines M2B at discharge. Pharmacy has been updated to [facility] #[id number] ([place], [place]).    Sources used to complete the med history include the outpatient dispense report, OARRS, 1/16 Hem Onc note, 1/15 Rheumatology note, 1/11 nephrology note, [facility] pharmacy, and patient interview (good historian, able to list all medications and confirm strengths/directions).    Below are additional concerns with the patient's PTA list.  -Patient states taking warfarin 5mg 1 tablet by mouth daily. Per [facility] pharmacy, last dispense was 30 tablets for 30 day supply on 8/24/2023.    Suzanna Abbott PharmD   [facility] PGY1 Pharmacy Resident   Meds Ambulatory and Retail  "Services    Please reach out via [email or web address] Secure Chat for questions, or if no response call [phone] or vocera \"MedRec\"    "

## 2024-01-19 ENCOUNTER — APPOINTMENT (OUTPATIENT)
Dept: RADIOLOGY | Facility: HOSPITAL | Age: 54
DRG: 812 | End: 2024-01-19
Payer: COMMERCIAL

## 2024-01-19 ENCOUNTER — APPOINTMENT (OUTPATIENT)
Dept: CARDIOLOGY | Facility: HOSPITAL | Age: 54
End: 2024-01-19
Payer: COMMERCIAL

## 2024-01-19 ENCOUNTER — APPOINTMENT (OUTPATIENT)
Dept: OTHER | Facility: HOSPITAL | Age: 54
DRG: 812 | End: 2024-01-19
Payer: COMMERCIAL

## 2024-01-19 DIAGNOSIS — Z82.61 FAMILY HISTORY OF RHEUMATOID ARTHRITIS: Primary | ICD-10-CM

## 2024-01-19 LAB
ALBUMIN SERPL BCP-MCNC: 3.5 G/DL (ref 3.4–5)
ALP SERPL-CCNC: 92 U/L (ref 33–110)
ALT SERPL W P-5'-P-CCNC: 6 U/L (ref 7–45)
ANION GAP BLDA CALCULATED.4IONS-SCNC: 5 MMO/L (ref 10–25)
ANION GAP SERPL CALC-SCNC: 8 MMOL/L (ref 10–20)
APTT PPP: 188 SECONDS (ref 27–38)
APTT PPP: 42 SECONDS (ref 27–38)
AST SERPL W P-5'-P-CCNC: 13 U/L (ref 9–39)
BASE EXCESS BLDA CALC-SCNC: 4.3 MMOL/L (ref -2–3)
BILIRUB SERPL-MCNC: 0.7 MG/DL (ref 0–1.2)
BLOOD EXPIRATION DATE: NORMAL
BODY TEMPERATURE: 37 DEGREES CELSIUS
BUN SERPL-MCNC: 21 MG/DL (ref 6–23)
CA-I BLD-SCNC: 0.96 MMOL/L (ref 1.1–1.33)
CA-I BLDA-SCNC: 1.18 MMOL/L (ref 1.1–1.33)
CALCIUM SERPL-MCNC: 8.8 MG/DL (ref 8.6–10.6)
CHLORIDE BLDA-SCNC: 101 MMOL/L (ref 98–107)
CHLORIDE SERPL-SCNC: 100 MMOL/L (ref 98–107)
CO2 SERPL-SCNC: 30 MMOL/L (ref 21–32)
CREAT SERPL-MCNC: 1.34 MG/DL (ref 0.5–1.05)
DISPENSE STATUS: NORMAL
EGFRCR SERPLBLD CKD-EPI 2021: 47 ML/MIN/1.73M*2
ERYTHROCYTE [DISTWIDTH] IN BLOOD BY AUTOMATED COUNT: 21.4 % (ref 11.5–14.5)
ERYTHROCYTE [DISTWIDTH] IN BLOOD BY AUTOMATED COUNT: 27.7 % (ref 11.5–14.5)
GLUCOSE BLDA-MCNC: 89 MG/DL (ref 74–99)
GLUCOSE SERPL-MCNC: 132 MG/DL (ref 74–99)
HCO3 BLDA-SCNC: 31.7 MMOL/L (ref 22–26)
HCT VFR BLD AUTO: 27.2 % (ref 36–46)
HCT VFR BLD AUTO: 29.3 % (ref 36–46)
HCT VFR BLD EST: 29 % (ref 36–46)
HEMOGLOBIN A2: 3.1 % (ref 2–3.5)
HEMOGLOBIN A: 40 % (ref 95.8–98)
HEMOGLOBIN C: 28.1 %
HEMOGLOBIN F: 0.4 % (ref 0–2)
HEMOGLOBIN IDENTIFICATION INTERPRETATION: ABNORMAL
HEMOGLOBIN S: 28.4 %
HGB BLD-MCNC: 8.8 G/DL (ref 12–16)
HGB BLD-MCNC: 9.5 G/DL (ref 12–16)
HGB BLDA-MCNC: 9.8 G/DL (ref 12–16)
HGB RETIC QN: 21 PG (ref 28–38)
IMMATURE RETIC FRACTION: 8.9 %
INHALED O2 CONCENTRATION: 40 %
INR PPP: 3.4 (ref 0.9–1.1)
INR PPP: 3.7 (ref 0.9–1.1)
LACTATE BLDA-SCNC: 0.4 MMOL/L (ref 0.4–2)
LDH SERPL L TO P-CCNC: 183 U/L (ref 84–246)
MCH RBC QN AUTO: 23.5 PG (ref 26–34)
MCH RBC QN AUTO: 25.9 PG (ref 26–34)
MCHC RBC AUTO-ENTMCNC: 32.4 G/DL (ref 32–36)
MCHC RBC AUTO-ENTMCNC: 32.4 G/DL (ref 32–36)
MCV RBC AUTO: 73 FL (ref 80–100)
MCV RBC AUTO: 80 FL (ref 80–100)
NRBC BLD-RTO: 0.3 /100 WBCS (ref 0–0)
NRBC BLD-RTO: 8.4 /100 WBCS (ref 0–0)
OXYHGB MFR BLDA: 91.2 % (ref 94–98)
PATH REVIEW-HGB IDENTIFICATION: ABNORMAL
PCO2 BLDA: 63 MM HG (ref 38–42)
PH BLDA: 7.31 PH (ref 7.38–7.42)
PLATELET # BLD AUTO: 134 X10*3/UL (ref 150–450)
PLATELET # BLD AUTO: 275 X10*3/UL (ref 150–450)
PO2 BLDA: 70 MM HG (ref 85–95)
POTASSIUM BLDA-SCNC: 4.5 MMOL/L (ref 3.5–5.3)
POTASSIUM SERPL-SCNC: 4.1 MMOL/L (ref 3.5–5.3)
PRODUCT BLOOD TYPE: 1700
PRODUCT BLOOD TYPE: 1700
PRODUCT BLOOD TYPE: 7300
PRODUCT BLOOD TYPE: 9500
PRODUCT CODE: NORMAL
PROT SERPL-MCNC: 6.8 G/DL (ref 6.4–8.2)
PROTHROMBIN TIME: 39.2 SECONDS (ref 9.8–12.8)
PROTHROMBIN TIME: 42.2 SECONDS (ref 9.8–12.8)
RBC # BLD AUTO: 3.4 X10*6/UL (ref 4–5.2)
RBC # BLD AUTO: 4.04 X10*6/UL (ref 4–5.2)
RETICS #: 0.14 X10*6/UL (ref 0.02–0.08)
RETICS/RBC NFR AUTO: 4 % (ref 0.5–2)
SAO2 % BLDA: 96 % (ref 94–100)
SODIUM BLDA-SCNC: 133 MMOL/L (ref 136–145)
SODIUM SERPL-SCNC: 134 MMOL/L (ref 136–145)
UNIT ABO: NORMAL
UNIT NUMBER: NORMAL
UNIT RH: NORMAL
UNIT VOLUME: 259
UNIT VOLUME: 350
WBC # BLD AUTO: 5.5 X10*3/UL (ref 4.4–11.3)
WBC # BLD AUTO: 6.5 X10*3/UL (ref 4.4–11.3)
XM INTEP: NORMAL

## 2024-01-19 PROCEDURE — 36556 INSERT NON-TUNNEL CV CATH: CPT | Performed by: RADIOLOGY

## 2024-01-19 PROCEDURE — 36430 TRANSFUSION BLD/BLD COMPNT: CPT

## 2024-01-19 PROCEDURE — 94660 CPAP INITIATION&MGMT: CPT

## 2024-01-19 PROCEDURE — 6A550Z3 PHERESIS OF PLASMA, SINGLE: ICD-10-PCS

## 2024-01-19 PROCEDURE — 76937 US GUIDE VASCULAR ACCESS: CPT | Performed by: RADIOLOGY

## 2024-01-19 PROCEDURE — 36415 COLL VENOUS BLD VENIPUNCTURE: CPT

## 2024-01-19 PROCEDURE — 06H033Z INSERTION OF INFUSION DEVICE INTO INFERIOR VENA CAVA, PERCUTANEOUS APPROACH: ICD-10-PCS | Performed by: RADIOLOGY

## 2024-01-19 PROCEDURE — 2500000004 HC RX 250 GENERAL PHARMACY W/ HCPCS (ALT 636 FOR OP/ED): Performed by: RADIOLOGY

## 2024-01-19 PROCEDURE — 2500000004 HC RX 250 GENERAL PHARMACY W/ HCPCS (ALT 636 FOR OP/ED)

## 2024-01-19 PROCEDURE — 77001 FLUOROGUIDE FOR VEIN DEVICE: CPT | Performed by: RADIOLOGY

## 2024-01-19 PROCEDURE — 36512 APHERESIS RBC: CPT | Performed by: PATHOLOGY

## 2024-01-19 PROCEDURE — 82330 ASSAY OF CALCIUM: CPT

## 2024-01-19 PROCEDURE — 83020 HEMOGLOBIN ELECTROPHORESIS: CPT

## 2024-01-19 PROCEDURE — 36600 WITHDRAWAL OF ARTERIAL BLOOD: CPT

## 2024-01-19 PROCEDURE — C1752 CATH,HEMODIALYSIS,SHORT-TERM: HCPCS

## 2024-01-19 PROCEDURE — 2780000003 HC OR 278 NO HCPCS

## 2024-01-19 PROCEDURE — 80053 COMPREHEN METABOLIC PANEL: CPT

## 2024-01-19 PROCEDURE — 85610 PROTHROMBIN TIME: CPT

## 2024-01-19 PROCEDURE — 36512 APHERESIS RBC: CPT

## 2024-01-19 PROCEDURE — 85027 COMPLETE CBC AUTOMATED: CPT

## 2024-01-19 PROCEDURE — 83021 HEMOGLOBIN CHROMOTOGRAPHY: CPT

## 2024-01-19 PROCEDURE — 85045 AUTOMATED RETICULOCYTE COUNT: CPT

## 2024-01-19 PROCEDURE — 1200000002 HC GENERAL ROOM WITH TELEMETRY DAILY

## 2024-01-19 PROCEDURE — C1894 INTRO/SHEATH, NON-LASER: HCPCS

## 2024-01-19 PROCEDURE — 2500000001 HC RX 250 WO HCPCS SELF ADMINISTERED DRUGS (ALT 637 FOR MEDICARE OP)

## 2024-01-19 PROCEDURE — 85610 PROTHROMBIN TIME: CPT | Performed by: PHYSICIAN ASSISTANT

## 2024-01-19 PROCEDURE — 83615 LACTATE (LD) (LDH) ENZYME: CPT

## 2024-01-19 PROCEDURE — 93005 ELECTROCARDIOGRAM TRACING: CPT

## 2024-01-19 PROCEDURE — 36415 COLL VENOUS BLD VENIPUNCTURE: CPT | Performed by: PHYSICIAN ASSISTANT

## 2024-01-19 PROCEDURE — 84132 ASSAY OF SERUM POTASSIUM: CPT

## 2024-01-19 PROCEDURE — P9040 RBC LEUKOREDUCED IRRADIATED: HCPCS

## 2024-01-19 PROCEDURE — 36010 PLACE CATHETER IN VEIN: CPT | Performed by: RADIOLOGY

## 2024-01-19 PROCEDURE — 99233 SBSQ HOSP IP/OBS HIGH 50: CPT

## 2024-01-19 PROCEDURE — 2720000007 HC OR 272 NO HCPCS

## 2024-01-19 RX ORDER — ERGOCALCIFEROL 1.25 MG/1
50000 CAPSULE ORAL
Qty: 6 CAPSULE | Refills: 1 | Status: SHIPPED | OUTPATIENT
Start: 2024-01-19 | End: 2024-03-19

## 2024-01-19 RX ORDER — HYDROMORPHONE HYDROCHLORIDE 1 MG/ML
1 INJECTION, SOLUTION INTRAMUSCULAR; INTRAVENOUS; SUBCUTANEOUS
Status: DISCONTINUED | OUTPATIENT
Start: 2024-01-19 | End: 2024-01-20

## 2024-01-19 RX ORDER — HYDROMORPHONE HYDROCHLORIDE 1 MG/ML
1 INJECTION, SOLUTION INTRAMUSCULAR; INTRAVENOUS; SUBCUTANEOUS ONCE
Status: COMPLETED | OUTPATIENT
Start: 2024-01-19 | End: 2024-01-19

## 2024-01-19 RX ORDER — OXYCODONE HYDROCHLORIDE 5 MG/1
10 TABLET ORAL EVERY 4 HOURS PRN
Status: DISCONTINUED | OUTPATIENT
Start: 2024-01-19 | End: 2024-01-19

## 2024-01-19 RX ORDER — FENTANYL CITRATE 50 UG/ML
INJECTION, SOLUTION INTRAMUSCULAR; INTRAVENOUS
Status: COMPLETED | OUTPATIENT
Start: 2024-01-19 | End: 2024-01-19

## 2024-01-19 RX ORDER — NALOXONE HYDROCHLORIDE 0.4 MG/ML
0.2 INJECTION, SOLUTION INTRAMUSCULAR; INTRAVENOUS; SUBCUTANEOUS ONCE
Status: DISCONTINUED | OUTPATIENT
Start: 2024-01-19 | End: 2024-01-19

## 2024-01-19 RX ADMIN — DIPHENHYDRAMINE HYDROCHLORIDE 25 MG: 25 CAPSULE ORAL at 10:52

## 2024-01-19 RX ADMIN — FENTANYL CITRATE 50 MCG: 50 INJECTION, SOLUTION INTRAMUSCULAR; INTRAVENOUS at 08:40

## 2024-01-19 RX ADMIN — HYDROMORPHONE HYDROCHLORIDE 1 MG: 1 INJECTION, SOLUTION INTRAMUSCULAR; INTRAVENOUS; SUBCUTANEOUS at 10:28

## 2024-01-19 RX ADMIN — HEPARIN SODIUM 1000 UNITS: 1000 INJECTION INTRAVENOUS; SUBCUTANEOUS at 13:50

## 2024-01-19 RX ADMIN — HYDROMORPHONE HYDROCHLORIDE 1 MG: 1 INJECTION, SOLUTION INTRAMUSCULAR; INTRAVENOUS; SUBCUTANEOUS at 20:23

## 2024-01-19 RX ADMIN — CYCLOBENZAPRINE 10 MG: 10 TABLET, FILM COATED ORAL at 10:29

## 2024-01-19 RX ADMIN — CALCIUM GLUCONATE 25 ML/HR: 98 INJECTION, SOLUTION INTRAVENOUS at 11:38

## 2024-01-19 RX ADMIN — SODIUM CHLORIDE 500 ML: 9 INJECTION, SOLUTION INTRAVENOUS at 14:10

## 2024-01-19 RX ADMIN — DOCUSATE SODIUM 100 MG: 100 CAPSULE, LIQUID FILLED ORAL at 10:28

## 2024-01-19 RX ADMIN — HYDROMORPHONE HYDROCHLORIDE 1 MG: 1 INJECTION, SOLUTION INTRAMUSCULAR; INTRAVENOUS; SUBCUTANEOUS at 23:32

## 2024-01-19 RX ADMIN — Medication 1 TABLET: at 10:28

## 2024-01-19 RX ADMIN — FOLIC ACID 1 MG: 1 TABLET ORAL at 10:29

## 2024-01-19 RX ADMIN — LORATADINE 10 MG: 10 TABLET ORAL at 10:28

## 2024-01-19 RX ADMIN — HYDROMORPHONE HYDROCHLORIDE 1 MG: 1 INJECTION, SOLUTION INTRAMUSCULAR; INTRAVENOUS; SUBCUTANEOUS at 17:17

## 2024-01-19 RX ADMIN — OXYCODONE HYDROCHLORIDE AND ACETAMINOPHEN 500 MG: 500 TABLET ORAL at 10:28

## 2024-01-19 RX ADMIN — ACETAMINOPHEN 650 MG: 325 TABLET ORAL at 10:52

## 2024-01-19 RX ADMIN — METOPROLOL TARTRATE 25 MG: 25 TABLET, FILM COATED ORAL at 10:29

## 2024-01-19 RX ADMIN — FUROSEMIDE 20 MG: 20 TABLET ORAL at 10:29

## 2024-01-19 RX ADMIN — HYDROMORPHONE HYDROCHLORIDE 1 MG: 1 INJECTION, SOLUTION INTRAMUSCULAR; INTRAVENOUS; SUBCUTANEOUS at 06:11

## 2024-01-19 RX ADMIN — DULOXETINE HYDROCHLORIDE 40 MG: 20 CAPSULE, DELAYED RELEASE ORAL at 20:23

## 2024-01-19 ASSESSMENT — PAIN SCALES - GENERAL
PAINLEVEL_OUTOF10: 8
PAINLEVEL_OUTOF10: 0 - NO PAIN
PAINLEVEL_OUTOF10: 0 - NO PAIN
PAINLEVEL_OUTOF10: 6
PAINLEVEL_OUTOF10: 7
PAINLEVEL_OUTOF10: 8
PAINLEVEL_OUTOF10: 0 - NO PAIN

## 2024-01-19 ASSESSMENT — COGNITIVE AND FUNCTIONAL STATUS - GENERAL
MOBILITY SCORE: 23
DAILY ACTIVITIY SCORE: 24
DAILY ACTIVITIY SCORE: 24
CLIMB 3 TO 5 STEPS WITH RAILING: A LITTLE
MOBILITY SCORE: 24

## 2024-01-19 ASSESSMENT — PAIN DESCRIPTION - LOCATION: LOCATION: GENERALIZED

## 2024-01-19 ASSESSMENT — PAIN - FUNCTIONAL ASSESSMENT
PAIN_FUNCTIONAL_ASSESSMENT: 0-10

## 2024-01-19 ASSESSMENT — PAIN DESCRIPTION - DESCRIPTORS: DESCRIPTORS: OTHER (COMMENT)

## 2024-01-19 NOTE — PROGRESS NOTES
Pt is inpatient at [facility] for sickle cell.  Please follow up next week.

## 2024-01-19 NOTE — POST-PROCEDURE NOTE
This is a 53 y.o. female with Hemoglobin SC Disease currently on the chronic exchange program who underwent automated red blood cell exchange (RCE) with 7 RBC units with target HgbSC 26% and target HCT 28%.     I saw and evaluated the patient during the RCE.  The patient was resting in bed.  Vascular access functioned well.    WBC   Date/Time Value Ref Range Status   01/18/2024 01:09 PM 7.4 4.4 - 11.3 x10*3/uL Final     Hemoglobin   Date/Time Value Ref Range Status   01/18/2024 01:09 PM 9.8 (L) 12.0 - 16.0 g/dL Final     Hematocrit   Date/Time Value Ref Range Status   01/18/2024 01:09 PM 31.9 (L) 36.0 - 46.0 % Final     Platelets   Date/Time Value Ref Range Status   01/18/2024 01:09  150 - 450 x10*3/uL Final     Comment:     Platelet count verified by smear review.        POCT Calcium, Ionized   Date/Time Value Ref Range Status   01/18/2024 01:09 PM 1.06 (L) 1.1 - 1.33 mmol/L Final     Comment:     The performance characteristics of ionized calcium tested  in heparinized plasma or serum have been validated by the  individual  laboratory site where testing is performed.   Testing on heparinized plasma or serum is not approved by   the FDA; however, such approval is not necessary.        Hemoglobin S   Date/Time Value Ref Range Status   01/18/2024 01:09 PM 28.4 (H) <=0.0 % Final        Ferritin   Date/Time Value Ref Range Status   01/16/2024 12:59 PM 41 8 - 150 ng/mL Final        Pre-procedure vital signs at 11:14  T: 36.3 C, HR: 55, RR: 14, BP: 119/59  Pulse oximetry: 98% 2 L O2 nasal cannula    Post-procedure vital signs at 13:47  T: 36.4 C, HR: 60, RR: 14, BP: 80/50  Pulse oximetry: 100% 2 L O2  nasal cannula    Outcome: RCE completed.  Following the procedure at 14:05 blood pressure was measured 77/35 mmHg, 500 mL of fluid bolus was administered for hypotension. Following the hydration therapy, her blood pressure went up to 104/46. She was asymptomatic during this period and all other vital signs were  stable.     Assessment and Plan:  53 y.o. female with Hemoglobin SC Disease who underwent RCE as part of the chronic exchange program.    Draw post-procedure labs  Vascular access to be managed by the clinical team.  Next procedure to be scheduled by the apheresis center in coordination with primary outpatient hematology team.  Tentative next RCE in 4-6 weeks.

## 2024-01-19 NOTE — SIGNIFICANT EVENT
~0017 provider was notified that pt was put on CPAP and resp rate recorded at 7 by CPAP machine. Provider and nurse at beside, pt easily arousable and RR self recorded at 12, other VSS. MICU fellow called, rec ABG, hold opioids, low threshold for narcan and code white. ABG results: pH 7.31, pCO2 63, pO2 70, lactate 0.4. NACR at bedside and rec trial off cpap, place pt on 2L NC with continuous pulse ox and continue telemetry. On 2L NC pt RR 12 and SpO2 100%.

## 2024-01-19 NOTE — CARE PLAN
Problem: Fall/Injury  Goal: Not fall by end of shift  Outcome: Progressing  Goal: Be free from injury by end of the shift  Outcome: Progressing  Goal: Verbalize understanding of personal risk factors for fall in the hospital  Outcome: Progressing  Goal: Verbalize understanding of risk factor reduction measures to prevent injury from fall in the home  Outcome: Progressing  Goal: Use assistive devices by end of the shift  Outcome: Progressing  Goal: Pace activities to prevent fatigue by end of the shift  Outcome: Progressing     Problem: Pain  Goal: Takes deep breaths with improved pain control throughout the shift  Outcome: Progressing  Goal: Turns in bed with improved pain control throughout the shift  Outcome: Progressing  Goal: Walks with improved pain control throughout the shift  Outcome: Progressing  Goal: Performs ADL's with improved pain control throughout shift  Outcome: Progressing  Goal: Participates in PT with improved pain control throughout the shift  Outcome: Progressing  Goal: Free from opioid side effects throughout the shift  Outcome: Progressing  Goal: Free from acute confusion related to pain meds throughout the shift  Outcome: Progressing     Problem: Pain  Goal: My pain/discomfort is manageable  Outcome: Progressing     Problem: Safety  Goal: Patient will be injury free during hospitalization  Outcome: Progressing  Goal: I will remain free of falls  Outcome: Progressing     Problem: Daily Care  Goal: Daily care needs are met  Outcome: Progressing     Problem: Psychosocial Needs  Goal: Demonstrates ability to cope with hospitalization/illness  Outcome: Progressing  Goal: Collaborate with me, my family, and caregiver to identify my specific goals  Outcome: Progressing     Problem: Discharge Barriers  Goal: My discharge needs are met  Outcome: Progressing       The clinical goals for the shift include pt will report pain less than 7/10 throughout shift    Over the shift, the patient remained safe  and free from injury. Pain in shoulders and back, medicated PRN. Had bradypnea while on CPAP, placed on 2L NC and cont pulse ox, vitals improved after. Otherwise, vitals stable overnight

## 2024-01-19 NOTE — SIGNIFICANT EVENT
Rapid Response RN Note    Rapid response RN at bedside for RADAR score 8 due to the following VS: T 35.6 °Celsius; HR 58 ; RR 8; BP 98/63; SPO2 100%.     Reviewed above VS with bedside RN. VS recheck: T: 35.9 R: 12 HR: 62 BP: 103/66. No interventions by rapid response team indicated at this time.      Staff to page rapid response for any concerns or acute change in condition/VS. Randall Germain RN.

## 2024-01-19 NOTE — PROGRESS NOTES
Senait Marshall is a 53 y.o. female on day 1 of admission presenting with Sickle cell disease with crisis and other complication (CMS/HCC).    Subjective   Seen this AM at bedside. States that she is still having generalized pain this morning. Also discussed with patient about her event overnight in which her respirations and blood pressure were low. She stated she did not feel any different overnight or during this episode. Denies fever/chills, nausea/vomiting, H/A or SOB.     Objective  Physical Exam  Constitutional:       Appearance: Normal appearance.   HENT:      Head: Normocephalic and atraumatic.      Nose: Nose normal.      Mouth: Mucous membranes are moist.      Pharynx: Oropharynx is clear.   Eyes:      Extraocular Movements: Extraocular movements intact.      Pupils: Pupils are equal, round, and reactive to light.   Cardiovascular:      Rate and Rhythm: Normal rate and regular rhythm.      Pulses: Normal pulses.      Heart sounds: Normal heart sounds.   Pulmonary:      Effort: Pulmonary effort is normal.      Breath sounds: Normal breath sounds.   Abdominal:      General: Bowel sounds are normal.      Palpations: Abdomen is soft.   Musculoskeletal:         General: Normal range of motion.   Skin:     General: Skin is warm.   Neurological:      General: No focal deficit present.      Mental Status: She is alert and oriented to person, place, and time. Mental status is at baseline.   Psychiatric:         Mood and Affect: Mood normal.         Behavior: Behavior normal.       Last Recorded Vitals  Blood pressure 119/59, pulse 55, temperature 36.3 °C (97.3 °F), resp. rate 14, SpO2 98 %.  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 Pt has had complications with past outpatient exchanges including SVT following line placement requiring admission and hypoxia/lethargy following exchange (found to have new pHTN). Patient is now admitted for observation following apheresis line placement and RBCEx. Plan for apheresis line placement and routine RBC exchange 1/19.      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  - Managed through routine RBC exchanges q6 weeks, most recent RBCEx 12/8/23  - OARRS reviewed, no aberrant behavior noted  - new care plan from 12/6/23- For mild to moderate pain: Dilaudid 0.5mg IVP q3h as needed, for moderate to severe pain Dilaudid 1- 1.5mg q3h PRN  - Admitted for observation following line/red cell exchange, however patient is also currently in crisis  - 1/19 IV Dilaudid dose decreased from 1.5mg to 1 mg q3 after hypotension/bradypnea episode overnight, which resolved with no intervention  - Bowel regimen for opioid-induced constipation   - Continue home Duloxetine 40mg daily, PO Zofran PRN, Folic Acid 1mg daily, and MVI daily  - Plan for apheresis line placement 1/19 and routine RBCEx 1/19  - Exchange labs 1/18- HgbS 28.4%      # recent episode of dizziness  - may be related to hypotension  - s/p fall at home on 1/14  - orthostatic vitals negative (1/18)  - EKG showed no new abnormal activity  - Will order ECHO     # recently diagnosed pHTN   - 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 Yi and Dr. 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 On lifelong anticoagulation, DOAC discouraged due to high risk of clotting.   - INR 3.3 (1/18)  - Held coumadin dose (1/19) post-line placement, will monitor daily and dose base on INR  - Patient monitors INR with at home Coagcheck device  - Follows with Coumadin clinic outpatient  - Caution with fluids        # CHARLES on CKDII  - Baseline ~ SCr <2. Cr 1.17 on admission  - Thought to be related to hypotension mediated by metoprolol (hx of SVT) and furosemide (hx of heart failure)  - D5 1/2@50 (1-18-1/19 for 12 hours)  - Follows with nephrology outpt - last appointment 1/11     # CAN  - Continue home CPAP   - Continue home Albuterol PRN     # H/O Cauda Equine syndrome  - Follows Dr. Delacruz as outpatient     # DISPO:  - Full Code, confirmed on admit  - DC home with resumed O2 pending monitoring post red cell exchange   - FUV with sickle cell clinic on discharge         I spent >60 minutes in the professional and overall care of this patient.       Leanna Steven, APRN-CNP

## 2024-01-19 NOTE — CARE PLAN
The patient's goals for the shift include      The clinical goals for the shift include pt will report pain less than 7/10 throughout shift    Pt will tolerate blood exchange and femoral line placement with VSS and HDS

## 2024-01-19 NOTE — POST-PROCEDURE NOTE
Interventional Radiology Brief Postprocedure Note    Attending: Dr. Ibarra    Assistant: Dr. Nguyen    Diagnosis: Sickle Cell Anemia    Description of procedure: Status post successful IR guided placement of an aphaeresis catheter in the right groin common femoral vein. Catheter is ready for use.     Anesthesia:  Local    Complications: None    Estimated Blood Loss: none    Medications  As of 01/19/24 1039      docusate sodium (Colace) capsule 100 mg (mg) Total dose:  200 mg Dosing weight:  108      Date/Time Rate/Dose/Volume Action       01/18/24  1312 100 mg Given     01/19/24  1028 100 mg Given               polyethylene glycol (Glycolax, Miralax) packet 17 g (g) Total dose:  Cannot be calculated* Dosing weight:  108   *Administration dose not documented     Date/Time Rate/Dose/Volume Action       01/18/24  1312 *Not included in total See Alternative     01/19/24  1028 *Not included in total See Alternative               ascorbic acid (Vitamin C) tablet 500 mg (mg) Total dose:  1,000 mg Dosing weight:  108      Date/Time Rate/Dose/Volume Action       01/18/24  1312 500 mg Given     01/19/24  1028 500 mg Given               cyclobenzaprine (Flexeril) tablet 10 mg (mg) Total dose:  20 mg Dosing weight:  108      Date/Time Rate/Dose/Volume Action       01/18/24  1312 10 mg Given     01/19/24  1029 10 mg Given               DULoxetine (Cymbalta) DR capsule 40 mg (mg) Total dose:  40 mg      Date/Time Rate/Dose/Volume Action       01/18/24  2144 40 mg Given               fluticasone (Flonase) nasal spray 2 spray (spray) Total dose:  2 spray      Date/Time Rate/Dose/Volume Action       01/18/24  1312 2 spray Given               folic acid (Folvite) tablet 1 mg (mg) Total dose:  2 mg      Date/Time Rate/Dose/Volume Action       01/18/24  1312 1 mg Given     01/19/24  1029 1 mg Given               furosemide (Lasix) tablet 20 mg (mg) Total dose:  20 mg      Date/Time Rate/Dose/Volume Action       01/19/24  1029 20 mg Given                loratadine (Claritin) tablet 10 mg (mg) Total dose:  20 mg      Date/Time Rate/Dose/Volume Action       01/18/24  1312 10 mg Given     01/19/24  1028 10 mg Given               metoprolol tartrate (Lopressor) tablet 25 mg (mg) Total dose:  75 mg      Date/Time Rate/Dose/Volume Action       01/18/24  1312 25 mg Given      2144 25 mg Given     01/19/24  1029 25 mg Given               multivitamin with minerals 1 tablet (tablet) Total dose:  2 tablet      Date/Time Rate/Dose/Volume Action       01/18/24  1312 1 tablet Given     01/19/24  1028 1 tablet Given               warfarin (Coumadin) tablet 5 mg (mg) Total dose:  5 mg      Date/Time Rate/Dose/Volume Action       01/18/24  1812 5 mg Given     01/19/24  0751 *Not included in total Held by provider               oxyCODONE (Roxicodone) immediate release tablet 10 mg (mg) Total dose:  10 mg Dosing weight:  108      Date/Time Rate/Dose/Volume Action       01/18/24  1312 10 mg Given               diphenhydrAMINE (BENADryl) capsule 25 mg (mg) Total dose:  25 mg Dosing weight:  108      Date/Time Rate/Dose/Volume Action       01/18/24  2223 25 mg Given               HYDROmorphone (Dilaudid) injection 1.5 mg (mg) Total dose:  4.5 mg Dosing weight:  108      Date/Time Rate/Dose/Volume Action       01/18/24  1549 1.5 mg Given      1807 1.5 mg Given      2144 1.5 mg Given     01/19/24  0137 *Not included in total Held by provider      0717 *Not included in total Unheld by provider               HYDROmorphone (Dilaudid) injection 1 mg (mg) Total dose:  1 mg Dosing weight:  108      Date/Time Rate/Dose/Volume Action       01/19/24  0611 1 mg Given               HYDROmorphone (Dilaudid) injection 1 mg (mg) Total dose:  1 mg Dosing weight:  108      Date/Time Rate/Dose/Volume Action       01/19/24  1028 1 mg Given               dextrose 5%-0.45 % sodium chloride infusion (mL/hr) Total volume:  695 mL* Dosing weight:  108   *From user-documented volume     Date/Time  Rate/Dose/Volume Action       01/18/24  1655 50 mL/hr New Bag     01/19/24  0649 695 mL                oxygen (O2) therapy (L/min) Total volume:  Not documented* Dosing weight:  108   *Total volume has not been documented. View each administration to see the amount administered.     Date/Time Rate/Dose/Volume Action       01/18/24  1636  Start     01/19/24  0300 2 L/min Rate Verify               naloxone (Narcan) injection 0.2 mg (mg) Total dose:  0 mg* Dosing weight:  108   *Administration not included in total     Date/Time Rate/Dose/Volume Action       01/19/24  0200 *0.2 mg Missed               fentaNYL PF (Sublimaze) injection (mcg) Total dose:  50 mcg      Date/Time Rate/Dose/Volume Action       01/19/24  0840 50 mcg Given                   No specimens collected      See detailed result report with images in PACS.    The patient tolerated the procedure well without incident or complication and is in stable condition.

## 2024-01-19 NOTE — PRE-PROCEDURE NOTE
Interventional Radiology Preprocedure Note    Indication for procedure: Sickle cell disease with need for red cell exchange therapy.     Relevant review of systems: NA    Relevant Labs:   Lab Results   Component Value Date    CREATININE 1.17 (H) 01/18/2024    EGFR 56 (L) 01/18/2024    INR 3.3 (H) 01/18/2024    PROTIME 37.6 (H) 01/18/2024       Planned Sedation/Anesthesia: Minimal    Airway assessment: normal    Directed physical examination:    Physical Exam  HENT:      Head: Normocephalic.   Pulmonary:      Effort: Pulmonary effort is normal.   Neurological:      Mental Status: She is alert.          Mallampati: III (soft and hard palate and base of uvula visible)    ASA Score: ASA 3 - Patient with moderate systemic disease with functional limitations    Benefits, risks and alternatives of procedure and planned sedation have been discussed with the patient and/or their representative. All questions answered and they agree to proceed.

## 2024-01-19 NOTE — NURSING NOTE
"Apheresis Nursing Note    Senait Narvaez is a 53 y.o. female presenting for Chronic RBC exchange.    Pre-Procedure Assessment  Pre-Procedure Assessment  Level of Consciousness: Alert  Orientation Level: Oriented X4  Cognition: Appropriate judgement, Appropriate safety awareness, Appropriate attention/concentration, Appropriate for developmental age, Follows commands  Pain Score: 8  Pain Type: Chronic pain  Acceptable Comfort Level (Numeric): 5  Pain Descriptors: Other (Comment) (\"all of the above\")  Pain Interventions: Therapeutic presence (pain meds managed by primary team)  Access Sites 'Okay to Use' Obtained: Yes  Estimated Replacement Volume: 2300  Vital Signs  Temp: 36.3 °C  Heart Rate: 55  Resp: 14  BP: 119/59  Medical Gas Therapy  SpO2: 100 %  Pulse Oximetry Type: Intermittent  Oximetry Probe Site Location: Finger  Patient Activity During SpO2 Measurement: At rest  Medical Gas Therapy: Supplemental oxygen  O2 Delivery Method: Nasal cannula (2L)  Procedure Information and Time Out  Procedure Type: Red blood cell exchange  Red Cell Diagnosis: Other (see comment) (chronic exchange program)  Target Hemoglobin S (HgB S) (g/dL):  (Target HbS+HbC=26)  Target Hematocrit (HCT) %: 28 %  Target Fraction of Cells Remaining (FCR): 45  Units Used: 7  Volume Processed (L):  (n/a)  Fluid Balance: 100%  Kit and Blood/Fluid Warmer Inspection: Tubing inspected with machine primeApheresis RBC Exchange Time-Out Completed: Yes  Provider Time Out Completed with: Malcolm  Time Out Completed on: 01/19/24  Time Out Completed at: 1122  Equipment and Disposables  Apheresis Machine: Spectra Optia [id number]  Apheresis Machine PM in Date: Yes  Blood Warmer: Astotherm DNA [id number]  Blood Warmer PM in Date: Yes  Kit and Blood/Fluid Warmer Inspection: Tubing inspected prior to connection to patient  Kit Type: Exchange kits  Kit Lot Number: [id number]  Kit Expiration Date: 10/01/25  ADC-A Used as Anticoagulant: Yes  ACD-A Lot #: [id number]  ACD-A " "Expiration Date: 09/01/25  9% Sodium Chloride Lot # (Liter): [id number]  9% Sodium Chloride Expiration Date (Liter): 12/31/24  Procedure Start  Start Time: 1155    Post-Procedure Assessment:  Post-Procedure  End Time: 1343  Waste Materials Collected for Research: No  Procedure Outcome: Treatment completed successfully  Adverse Event: Yes (see comment)  Post-Procedure Line Assessment: Patent  Post-Procedure Access Care : Loaded/flushed per order, Caps changed, 'Do Not Flush' label applied  Addtional Comments/Procedure Details: Post report and post bolus vitals called to Dr. Fowler.  He sees no need for further interventions at this time.  Patient talking on phone and continues to state that she \"feels fine.\"  See flowsheet comments for more details.  Vital Signs  Temp: 36.2 °C (97.2 °F)  Heart Rate: 57  Resp: 16  BP: (!) 105/46  Medical Gas Therapy  SpO2: 100 %  Pulse Oximetry Type: Intermittent  Oximetry Probe Site Location: Finger  Patient Activity During SpO2 Measurement: At rest  Medical Gas Therapy: Supplemental oxygen  O2 Delivery Method: Nasal cannula (2L)  Post-Procedure Patient Fluid Values   Replacement Fluid Intake (mL): 2086 mL  AC Infused (mL): 269 mL  Rinseback Intake (mL): 0 mL  Ca+ Solution Intake (mL): 45 mL  Other Intake (mL): 500 mL (bolus)  Apheresis Intake Total (mL): 2900 mL  Removed During Apheresis Output (mL): 2471 mL  AC in Bag Output (mL): 112 mL  Samples Output (mL): 20 mL  Apheresis Output Total (mL): 2379 mL  Inlet Volume Processed (mL): 5018 mL  Net Difference (mL): 521 mL  Disposition  Patient's Condition at End of Procedure: Stable (Patient continuous to state that she feels fine.  She is more awake than during the last few hours and she ambulated to the bathroom without problems.)  Disposition: N/A - procedure performed at bedside  Transferred via:  (n/a)  Report Given to: Floor Nurse (Post report and latest vitals discussed with JERICA Skaggs.)  $ Apheresis Charge: Red blood cell " exchange    Victor Manuel Escalante RN

## 2024-01-20 LAB
ALBUMIN SERPL BCP-MCNC: 3.2 G/DL (ref 3.4–5)
ALP SERPL-CCNC: 81 U/L (ref 33–110)
ALT SERPL W P-5'-P-CCNC: 5 U/L (ref 7–45)
ANION GAP SERPL CALC-SCNC: 10 MMOL/L (ref 10–20)
AST SERPL W P-5'-P-CCNC: 11 U/L (ref 9–39)
BILIRUB SERPL-MCNC: 1.1 MG/DL (ref 0–1.2)
BUN SERPL-MCNC: 23 MG/DL (ref 6–23)
CALCIUM SERPL-MCNC: 8.2 MG/DL (ref 8.6–10.6)
CHLORIDE SERPL-SCNC: 104 MMOL/L (ref 98–107)
CO2 SERPL-SCNC: 29 MMOL/L (ref 21–32)
CREAT SERPL-MCNC: 1.21 MG/DL (ref 0.5–1.05)
EGFRCR SERPLBLD CKD-EPI 2021: 53 ML/MIN/1.73M*2
ERYTHROCYTE [DISTWIDTH] IN BLOOD BY AUTOMATED COUNT: 21.7 % (ref 11.5–14.5)
GLUCOSE SERPL-MCNC: 85 MG/DL (ref 74–99)
HCT VFR BLD AUTO: 25.5 % (ref 36–46)
HGB BLD-MCNC: 8.6 G/DL (ref 12–16)
HGB RETIC QN: 20 PG (ref 28–38)
IMMATURE RETIC FRACTION: 16.3 %
LDH SERPL L TO P-CCNC: 174 U/L (ref 84–246)
MCH RBC QN AUTO: 26.1 PG (ref 26–34)
MCHC RBC AUTO-ENTMCNC: 33.7 G/DL (ref 32–36)
MCV RBC AUTO: 77 FL (ref 80–100)
NRBC BLD-RTO: 0.3 /100 WBCS (ref 0–0)
PLATELET # BLD AUTO: 144 X10*3/UL (ref 150–450)
POTASSIUM SERPL-SCNC: 4.6 MMOL/L (ref 3.5–5.3)
PROT SERPL-MCNC: 5.7 G/DL (ref 6.4–8.2)
RBC # BLD AUTO: 3.3 X10*6/UL (ref 4–5.2)
RETICS #: 0.15 X10*6/UL (ref 0.02–0.08)
RETICS/RBC NFR AUTO: 4.5 % (ref 0.5–2)
SODIUM SERPL-SCNC: 138 MMOL/L (ref 136–145)
WBC # BLD AUTO: 6.8 X10*3/UL (ref 4.4–11.3)

## 2024-01-20 PROCEDURE — 85027 COMPLETE CBC AUTOMATED: CPT

## 2024-01-20 PROCEDURE — 80053 COMPREHEN METABOLIC PANEL: CPT

## 2024-01-20 PROCEDURE — 94660 CPAP INITIATION&MGMT: CPT

## 2024-01-20 PROCEDURE — 99233 SBSQ HOSP IP/OBS HIGH 50: CPT | Performed by: PHYSICIAN ASSISTANT

## 2024-01-20 PROCEDURE — 1170000001 HC PRIVATE ONCOLOGY ROOM DAILY

## 2024-01-20 PROCEDURE — 2500000005 HC RX 250 GENERAL PHARMACY W/O HCPCS

## 2024-01-20 PROCEDURE — 2500000004 HC RX 250 GENERAL PHARMACY W/ HCPCS (ALT 636 FOR OP/ED): Performed by: PHYSICIAN ASSISTANT

## 2024-01-20 PROCEDURE — 2500000001 HC RX 250 WO HCPCS SELF ADMINISTERED DRUGS (ALT 637 FOR MEDICARE OP)

## 2024-01-20 PROCEDURE — 85045 AUTOMATED RETICULOCYTE COUNT: CPT

## 2024-01-20 PROCEDURE — 2500000004 HC RX 250 GENERAL PHARMACY W/ HCPCS (ALT 636 FOR OP/ED)

## 2024-01-20 PROCEDURE — 36415 COLL VENOUS BLD VENIPUNCTURE: CPT

## 2024-01-20 PROCEDURE — 83615 LACTATE (LD) (LDH) ENZYME: CPT

## 2024-01-20 RX ORDER — METOPROLOL TARTRATE 25 MG/1
12.5 TABLET, FILM COATED ORAL 2 TIMES DAILY
Status: DISCONTINUED | OUTPATIENT
Start: 2024-01-20 | End: 2024-01-23

## 2024-01-20 RX ORDER — HYDROMORPHONE HYDROCHLORIDE 1 MG/ML
1.5 INJECTION, SOLUTION INTRAMUSCULAR; INTRAVENOUS; SUBCUTANEOUS
Status: DISCONTINUED | OUTPATIENT
Start: 2024-01-20 | End: 2024-01-21

## 2024-01-20 RX ORDER — WARFARIN 2.5 MG/1
2.5 TABLET ORAL ONCE
Status: COMPLETED | OUTPATIENT
Start: 2024-01-20 | End: 2024-01-20

## 2024-01-20 RX ADMIN — Medication 1 TABLET: at 08:48

## 2024-01-20 RX ADMIN — FOLIC ACID 1 MG: 1 TABLET ORAL at 08:48

## 2024-01-20 RX ADMIN — DULOXETINE HYDROCHLORIDE 40 MG: 20 CAPSULE, DELAYED RELEASE ORAL at 20:37

## 2024-01-20 RX ADMIN — HYDROMORPHONE HYDROCHLORIDE 1 MG: 1 INJECTION, SOLUTION INTRAMUSCULAR; INTRAVENOUS; SUBCUTANEOUS at 08:40

## 2024-01-20 RX ADMIN — HYDROMORPHONE HYDROCHLORIDE 1.5 MG: 1 INJECTION, SOLUTION INTRAMUSCULAR; INTRAVENOUS; SUBCUTANEOUS at 14:59

## 2024-01-20 RX ADMIN — HYDROMORPHONE HYDROCHLORIDE 1.5 MG: 1 INJECTION, SOLUTION INTRAMUSCULAR; INTRAVENOUS; SUBCUTANEOUS at 20:30

## 2024-01-20 RX ADMIN — HYDROMORPHONE HYDROCHLORIDE 1.5 MG: 1 INJECTION, SOLUTION INTRAMUSCULAR; INTRAVENOUS; SUBCUTANEOUS at 11:53

## 2024-01-20 RX ADMIN — HYDROMORPHONE HYDROCHLORIDE 1 MG: 1 INJECTION, SOLUTION INTRAMUSCULAR; INTRAVENOUS; SUBCUTANEOUS at 03:54

## 2024-01-20 RX ADMIN — DIPHENHYDRAMINE HYDROCHLORIDE 25 MG: 25 CAPSULE ORAL at 12:01

## 2024-01-20 RX ADMIN — OXYCODONE HYDROCHLORIDE AND ACETAMINOPHEN 500 MG: 500 TABLET ORAL at 08:48

## 2024-01-20 RX ADMIN — HYDROMORPHONE HYDROCHLORIDE 1.5 MG: 1 INJECTION, SOLUTION INTRAMUSCULAR; INTRAVENOUS; SUBCUTANEOUS at 23:54

## 2024-01-20 RX ADMIN — WARFARIN SODIUM 2.5 MG: 2.5 TABLET ORAL at 18:02

## 2024-01-20 RX ADMIN — HYDROMORPHONE HYDROCHLORIDE 1.5 MG: 1 INJECTION, SOLUTION INTRAMUSCULAR; INTRAVENOUS; SUBCUTANEOUS at 18:02

## 2024-01-20 RX ADMIN — TRAZODONE HYDROCHLORIDE 50 MG: 50 TABLET ORAL at 20:30

## 2024-01-20 RX ADMIN — Medication 2 L/MIN: at 08:35

## 2024-01-20 ASSESSMENT — COGNITIVE AND FUNCTIONAL STATUS - GENERAL
MOBILITY SCORE: 24
MOBILITY SCORE: 24
DAILY ACTIVITIY SCORE: 24
DAILY ACTIVITIY SCORE: 24

## 2024-01-20 ASSESSMENT — PAIN SCALES - GENERAL
PAINLEVEL_OUTOF10: 9
PAINLEVEL_OUTOF10: 8
PAINLEVEL_OUTOF10: 9
PAINLEVEL_OUTOF10: 9
PAINLEVEL_OUTOF10: 8
PAINLEVEL_OUTOF10: 9
PAINLEVEL_OUTOF10: 8
PAINLEVEL_OUTOF10: 9

## 2024-01-20 ASSESSMENT — PAIN - FUNCTIONAL ASSESSMENT
PAIN_FUNCTIONAL_ASSESSMENT: 0-10

## 2024-01-20 NOTE — CARE PLAN
Problem: Fall/Injury  Goal: Not fall by end of shift  Outcome: Progressing  Goal: Be free from injury by end of the shift  Outcome: Progressing  Goal: Verbalize understanding of personal risk factors for fall in the hospital  Outcome: Progressing  Goal: Verbalize understanding of risk factor reduction measures to prevent injury from fall in the home  Outcome: Progressing  Goal: Use assistive devices by end of the shift  Outcome: Progressing  Goal: Pace activities to prevent fatigue by end of the shift  Outcome: Progressing     Problem: Pain  Goal: Takes deep breaths with improved pain control throughout the shift  Outcome: Progressing  Goal: Turns in bed with improved pain control throughout the shift  Outcome: Progressing  Goal: Walks with improved pain control throughout the shift  Outcome: Progressing  Goal: Performs ADL's with improved pain control throughout shift  Outcome: Progressing  Goal: Participates in PT with improved pain control throughout the shift  Outcome: Progressing  Goal: Free from opioid side effects throughout the shift  Outcome: Progressing  Goal: Free from acute confusion related to pain meds throughout the shift  Outcome: Progressing     Problem: Pain  Goal: My pain/discomfort is manageable  Outcome: Progressing     Problem: Safety  Goal: Patient will be injury free during hospitalization  Outcome: Progressing  Goal: I will remain free of falls  Outcome: Progressing     Problem: Daily Care  Goal: Daily care needs are met  Outcome: Progressing     Problem: Psychosocial Needs  Goal: Demonstrates ability to cope with hospitalization/illness  Outcome: Progressing  Goal: Collaborate with me, my family, and caregiver to identify my specific goals  Outcome: Progressing     Problem: Discharge Barriers  Goal: My discharge needs are met  Outcome: Progressing       The clinical goals for the shift include pt will rate pain less than 7/10 throughout shift    Over the shift, the patient remained safe  and free from injury. Had generalized pain, medicated PRN. Vitals stable. No acute events overnight

## 2024-01-20 NOTE — PROGRESS NOTES
Senait Marshall is a 53 y.o. female on day 2 of admission presenting with Sickle cell disease with crisis and other complication (CMS/HCC).    Subjective   Seen this morning at bedside. Did well overnight but still having lingering pain. We discussed going back up on IV Dilaudid to 1.5, was adjusted yesterday because she gets a little sedated from Benadryl which is given as a pre-med for exchange transfusion and is part of the protocol.  Denies fever/chills, nausea/vomiting, H/A or SOB.     Objective  Physical Exam  Constitutional:       Appearance: Normal appearance.   HENT:      Head: Normocephalic and atraumatic.      Nose: Nose normal.      Mouth: Mucous membranes are moist.      Pharynx: Oropharynx is clear.   Eyes:      Extraocular Movements: Extraocular movements intact.      Pupils: Pupils are equal, round, and reactive to light.   Cardiovascular:      Rate and Rhythm: Normal rate and regular rhythm.      Pulses: Normal pulses.      Heart sounds: Normal heart sounds.   Pulmonary:      Effort: Pulmonary effort is normal.      Breath sounds: Normal breath sounds.   Abdominal:      General: Bowel sounds are normal.      Palpations: Abdomen is soft.   Musculoskeletal:         General: Normal range of motion.   Skin:     General: Skin is warm.   Neurological:      General: No focal deficit present.      Mental Status: She is alert and oriented to person, place, and time. Mental status is at baseline.   Psychiatric:         Mood and Affect: Mood normal.         Behavior: Behavior normal.     Last Recorded Vitals  Blood pressure 119/59, pulse 55, temperature 36.3 °C (97.3 °F), resp. rate 14, SpO2 98 %.  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 Pt has had complications with past outpatient exchanges including SVT following line placement requiring admission and hypoxia/lethargy following exchange (found to have new pHTN). Patient is now admitted for observation following apheresis line placement and RBCEx. Apheresis was placed on 1/19 and she had her routine exchange after. After her red cell exchange she was hypotensive with SBP <100, and fell asleep after the exchange likely from the Benadryl. She wore up and was not sedated with repeated SBP in `120. Plan discharge tomorrow if pain control is optimized.      # HbSC disease   - Now in crisis   - Managed through routine RBC exchanges q6 weeks, most recent prior to admit was on 12/8/23  - OARRS reviewed, no aberrant behavior noted  - new care plan from 12/6/23- For mild to moderate pain: Dilaudid 0.5mg IVP q3h as needed, for moderate to severe pain Dilaudid 1- 1.5mg q3h PRN  - Admitted for observation following line/red cell exchange, however patient is also currently in crisis  - 1/19 IV Dilaudid dose decreased from 1.5mg to 1 mg q3 after hypotension/bradypnea episode overnight, which resolved with no intervention  - 1/20: Increase IV Dilaudid to 1.5mg q3 hrs PRN   - Bowel regimen for opioid-induced constipation   - Continue home Duloxetine 40mg daily, PO Zofran PRN, Folic Acid 1mg daily, and MVI daily  - S/P apheresis line placement  (IR) on 1/19 and routine RBCEx 1/19  - Exchange labs 1/18- HgbS 28.4%. Post exchange Hgb S pending       # Recent episode of dizziness  - Now resolved   - may be related to hypotension  - s/p fall at home on 1/14  - orthostatic vitals negative (1/18)  - EKG showed no new abnormal activity  - Repeat ECHO  ordered      # Recently diagnosed pHTN   - 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 Yi and Dr. 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 Reduced inpatient on 1/20 for hypotension, start Metoprolol 12.5 mg BID   - Stable on tele since admission, discontinue cardiac monitor   - Follows with Cardiology outpt, will request outpatient follow up post discharge to adjust meds given persistent hypotension     # DVT/ PE/ SVC Thrombus  - Hx SVC stricture s/p SVC angioplasty  - B/l Upper Extremity and Facial Swelling d/t partial filling defect within the SVC and a thrombus of the SVC complicated by bilateral Mediport catheters. On lifelong anticoagulation, DOAC discouraged due to high risk of clotting.   - ,   - INR 3.3 (1/18), INR 3.4 (1/20)   - Held coumadin dose (1/19) post-line placement, will monitor daily and dose base on INR  - Patient monitors INR with at home CoaCastTVheck device  - Follows with Coumadin clinic outpatient  - Caution with fluids  - Cont Lasix 20mg every other day   - Check INR on 1/21 for Coumadin dosing, currently on Hold for elevate INR      # CHARLES on CKDII  - Baseline ~ SCr <2. Cr 1.17 on admission  - Thought to be related to hypotension mediated by metoprolol (hx of SVT) and furosemide (hx of heart failure)  - D5 1/2@50 (1-18-1/19 for 12 hours)  - 1/20: Cr  1.2   - Follows with nephrology outpt - last appointment 1/11     # CAN  - Continue home CPAP   - Continue home Albuterol PRN     # H/O Cauda Equine syndrome  - Follows Dr. Delacruz as outpatient     # DISPO:  - Full Code, confirmed on admit  - DC home with resumed O2 pending monitoring post red cell exchange   - FUV with sickle cell clinic on discharge      I spent >60 minutes in the professional and overall care of this patient.  IRMA WilliamC

## 2024-01-21 LAB
ALBUMIN SERPL BCP-MCNC: 3.1 G/DL (ref 3.4–5)
ALP SERPL-CCNC: 81 U/L (ref 33–110)
ALT SERPL W P-5'-P-CCNC: 5 U/L (ref 7–45)
ANION GAP SERPL CALC-SCNC: 9 MMOL/L (ref 10–20)
AST SERPL W P-5'-P-CCNC: 10 U/L (ref 9–39)
BILIRUB SERPL-MCNC: 0.7 MG/DL (ref 0–1.2)
BUN SERPL-MCNC: 19 MG/DL (ref 6–23)
CALCIUM SERPL-MCNC: 8.5 MG/DL (ref 8.6–10.6)
CHLORIDE SERPL-SCNC: 103 MMOL/L (ref 98–107)
CO2 SERPL-SCNC: 32 MMOL/L (ref 21–32)
CREAT SERPL-MCNC: 1.01 MG/DL (ref 0.5–1.05)
EGFRCR SERPLBLD CKD-EPI 2021: 66 ML/MIN/1.73M*2
ERYTHROCYTE [DISTWIDTH] IN BLOOD BY AUTOMATED COUNT: 24 % (ref 11.5–14.5)
GLUCOSE SERPL-MCNC: 88 MG/DL (ref 74–99)
HCT VFR BLD AUTO: 24.6 % (ref 36–46)
HGB BLD-MCNC: 7.8 G/DL (ref 12–16)
HGB RETIC QN: 20 PG (ref 28–38)
IMMATURE RETIC FRACTION: 23.7 %
INR PPP: 2.7 (ref 0.9–1.1)
LDH SERPL L TO P-CCNC: 161 U/L (ref 84–246)
MCH RBC QN AUTO: 25.4 PG (ref 26–34)
MCHC RBC AUTO-ENTMCNC: 31.7 G/DL (ref 32–36)
MCV RBC AUTO: 80 FL (ref 80–100)
NRBC BLD-RTO: 16.8 /100 WBCS (ref 0–0)
PLATELET # BLD AUTO: 178 X10*3/UL (ref 150–450)
POTASSIUM SERPL-SCNC: 4.5 MMOL/L (ref 3.5–5.3)
PROT SERPL-MCNC: 5.6 G/DL (ref 6.4–8.2)
PROTHROMBIN TIME: 30.9 SECONDS (ref 9.8–12.8)
RBC # BLD AUTO: 3.07 X10*6/UL (ref 4–5.2)
RETICS #: 0.2 X10*6/UL (ref 0.02–0.08)
RETICS/RBC NFR AUTO: 6.4 % (ref 0.5–2)
SODIUM SERPL-SCNC: 139 MMOL/L (ref 136–145)
WBC # BLD AUTO: 6.7 X10*3/UL (ref 4.4–11.3)

## 2024-01-21 PROCEDURE — 2500000005 HC RX 250 GENERAL PHARMACY W/O HCPCS

## 2024-01-21 PROCEDURE — 1170000001 HC PRIVATE ONCOLOGY ROOM DAILY

## 2024-01-21 PROCEDURE — 83615 LACTATE (LD) (LDH) ENZYME: CPT | Performed by: PHYSICIAN ASSISTANT

## 2024-01-21 PROCEDURE — 85610 PROTHROMBIN TIME: CPT | Performed by: PHYSICIAN ASSISTANT

## 2024-01-21 PROCEDURE — 85045 AUTOMATED RETICULOCYTE COUNT: CPT | Performed by: PHYSICIAN ASSISTANT

## 2024-01-21 PROCEDURE — 99233 SBSQ HOSP IP/OBS HIGH 50: CPT

## 2024-01-21 PROCEDURE — 2500000001 HC RX 250 WO HCPCS SELF ADMINISTERED DRUGS (ALT 637 FOR MEDICARE OP)

## 2024-01-21 PROCEDURE — 2500000004 HC RX 250 GENERAL PHARMACY W/ HCPCS (ALT 636 FOR OP/ED)

## 2024-01-21 PROCEDURE — 85027 COMPLETE CBC AUTOMATED: CPT | Performed by: PHYSICIAN ASSISTANT

## 2024-01-21 PROCEDURE — 36415 COLL VENOUS BLD VENIPUNCTURE: CPT | Performed by: PHYSICIAN ASSISTANT

## 2024-01-21 PROCEDURE — 84075 ASSAY ALKALINE PHOSPHATASE: CPT | Performed by: PHYSICIAN ASSISTANT

## 2024-01-21 PROCEDURE — 2500000004 HC RX 250 GENERAL PHARMACY W/ HCPCS (ALT 636 FOR OP/ED): Performed by: PHYSICIAN ASSISTANT

## 2024-01-21 RX ORDER — AMOXICILLIN 250 MG
2 CAPSULE ORAL NIGHTLY
Status: DISCONTINUED | OUTPATIENT
Start: 2024-01-21 | End: 2024-01-24 | Stop reason: HOSPADM

## 2024-01-21 RX ORDER — LACTULOSE 10 G/15ML
20 SOLUTION ORAL DAILY
Status: DISCONTINUED | OUTPATIENT
Start: 2024-01-21 | End: 2024-01-21

## 2024-01-21 RX ORDER — ALBUTEROL SULFATE 90 UG/1
2 AEROSOL, METERED RESPIRATORY (INHALATION) EVERY 6 HOURS PRN
Qty: 18 G | Refills: 11 | Status: ON HOLD | OUTPATIENT
Start: 2024-01-21 | End: 2025-01-20

## 2024-01-21 RX ORDER — HYDROMORPHONE HYDROCHLORIDE 1 MG/ML
1 INJECTION, SOLUTION INTRAMUSCULAR; INTRAVENOUS; SUBCUTANEOUS
Status: DISCONTINUED | OUTPATIENT
Start: 2024-01-21 | End: 2024-01-22

## 2024-01-21 RX ORDER — METOPROLOL TARTRATE 25 MG/1
12.5 TABLET, FILM COATED ORAL 2 TIMES DAILY
Qty: 30 TABLET | Refills: 11 | Status: ON HOLD | OUTPATIENT
Start: 2024-01-21 | End: 2025-01-20

## 2024-01-21 RX ORDER — POLYETHYLENE GLYCOL 3350 17 G/17G
17 POWDER, FOR SOLUTION ORAL DAILY
Status: DISCONTINUED | OUTPATIENT
Start: 2024-01-21 | End: 2024-01-24 | Stop reason: HOSPADM

## 2024-01-21 RX ORDER — HYDROMORPHONE HYDROCHLORIDE 1 MG/ML
1 INJECTION, SOLUTION INTRAMUSCULAR; INTRAVENOUS; SUBCUTANEOUS ONCE
Status: COMPLETED | OUTPATIENT
Start: 2024-01-21 | End: 2024-01-21

## 2024-01-21 RX ORDER — WARFARIN 2.5 MG/1
2.5 TABLET ORAL ONCE
Status: COMPLETED | OUTPATIENT
Start: 2024-01-21 | End: 2024-01-21

## 2024-01-21 RX ADMIN — HYDROMORPHONE HYDROCHLORIDE 1 MG: 1 INJECTION, SOLUTION INTRAMUSCULAR; INTRAVENOUS; SUBCUTANEOUS at 12:27

## 2024-01-21 RX ADMIN — Medication 2 L/MIN: at 08:46

## 2024-01-21 RX ADMIN — Medication 1 TABLET: at 09:11

## 2024-01-21 RX ADMIN — HYDROMORPHONE HYDROCHLORIDE 1.5 MG: 1 INJECTION, SOLUTION INTRAMUSCULAR; INTRAVENOUS; SUBCUTANEOUS at 06:04

## 2024-01-21 RX ADMIN — DULOXETINE HYDROCHLORIDE 40 MG: 20 CAPSULE, DELAYED RELEASE ORAL at 20:48

## 2024-01-21 RX ADMIN — HYDROMORPHONE HYDROCHLORIDE 1 MG: 1 INJECTION, SOLUTION INTRAMUSCULAR; INTRAVENOUS; SUBCUTANEOUS at 21:08

## 2024-01-21 RX ADMIN — FOLIC ACID 1 MG: 1 TABLET ORAL at 09:11

## 2024-01-21 RX ADMIN — OXYCODONE HYDROCHLORIDE AND ACETAMINOPHEN 500 MG: 500 TABLET ORAL at 09:11

## 2024-01-21 RX ADMIN — HYDROMORPHONE HYDROCHLORIDE 1.5 MG: 1 INJECTION, SOLUTION INTRAMUSCULAR; INTRAVENOUS; SUBCUTANEOUS at 02:57

## 2024-01-21 RX ADMIN — HYDROMORPHONE HYDROCHLORIDE 1 MG: 1 INJECTION, SOLUTION INTRAMUSCULAR; INTRAVENOUS; SUBCUTANEOUS at 18:44

## 2024-01-21 RX ADMIN — HYDROMORPHONE HYDROCHLORIDE 1 MG: 1 INJECTION, SOLUTION INTRAMUSCULAR; INTRAVENOUS; SUBCUTANEOUS at 13:00

## 2024-01-21 RX ADMIN — SENNOSIDES AND DOCUSATE SODIUM 2 TABLET: 8.6; 5 TABLET ORAL at 20:48

## 2024-01-21 RX ADMIN — HYDROMORPHONE HYDROCHLORIDE 1 MG: 1 INJECTION, SOLUTION INTRAMUSCULAR; INTRAVENOUS; SUBCUTANEOUS at 15:43

## 2024-01-21 RX ADMIN — WARFARIN SODIUM 2.5 MG: 2.5 TABLET ORAL at 18:11

## 2024-01-21 RX ADMIN — HYDROMORPHONE HYDROCHLORIDE 1 MG: 1 INJECTION, SOLUTION INTRAMUSCULAR; INTRAVENOUS; SUBCUTANEOUS at 09:11

## 2024-01-21 ASSESSMENT — PAIN SCALES - GENERAL
PAINLEVEL_OUTOF10: 8
PAINLEVEL_OUTOF10: 9
PAINLEVEL_OUTOF10: 9
PAINLEVEL_OUTOF10: 8
PAINLEVEL_OUTOF10: 9
PAINLEVEL_OUTOF10: 8

## 2024-01-21 ASSESSMENT — PAIN - FUNCTIONAL ASSESSMENT
PAIN_FUNCTIONAL_ASSESSMENT: 0-10

## 2024-01-21 ASSESSMENT — COGNITIVE AND FUNCTIONAL STATUS - GENERAL
DAILY ACTIVITIY SCORE: 24
MOBILITY SCORE: 24

## 2024-01-21 NOTE — SIGNIFICANT EVENT
RT at bedside to speak with patient about cpap for tonight. Pt states she is uninterested in wearing cpap machine tonight but will reach out if she changes her mind.

## 2024-01-21 NOTE — DISCHARGE INSTRUCTIONS
For pain medication refills please call the sickle cell prescription line at [phone].  For scheduling follow up with sickle cell team call [phone].  If you feel your pain is uncontrolled at home, please call the sickle cell acute care clinic ( if you qualify)  Monday-Friday, 8a-2p at [phone].

## 2024-01-21 NOTE — CARE PLAN
Problem: Fall/Injury  Goal: Not fall by end of shift  Outcome: Progressing  Goal: Be free from injury by end of the shift  Outcome: Progressing  Goal: Verbalize understanding of personal risk factors for fall in the hospital  Outcome: Progressing  Goal: Verbalize understanding of risk factor reduction measures to prevent injury from fall in the home  Outcome: Progressing  Goal: Use assistive devices by end of the shift  Outcome: Progressing  Goal: Pace activities to prevent fatigue by end of the shift  Outcome: Progressing     Problem: Pain  Goal: Takes deep breaths with improved pain control throughout the shift  Outcome: Progressing  Goal: Turns in bed with improved pain control throughout the shift  Outcome: Progressing  Goal: Walks with improved pain control throughout the shift  Outcome: Progressing  Goal: Performs ADL's with improved pain control throughout shift  Outcome: Progressing  Goal: Participates in PT with improved pain control throughout the shift  Outcome: Progressing  Goal: Free from opioid side effects throughout the shift  Outcome: Progressing  Goal: Free from acute confusion related to pain meds throughout the shift  Outcome: Progressing     Problem: Pain  Goal: My pain/discomfort is manageable  Outcome: Progressing     Problem: Safety  Goal: Patient will be injury free during hospitalization  Outcome: Progressing  Goal: I will remain free of falls  Outcome: Progressing     Problem: Daily Care  Goal: Daily care needs are met  Outcome: Progressing     Problem: Psychosocial Needs  Goal: Demonstrates ability to cope with hospitalization/illness  Outcome: Progressing  Goal: Collaborate with me, my family, and caregiver to identify my specific goals  Outcome: Progressing     Problem: Discharge Barriers  Goal: My discharge needs are met  Outcome: Progressing       The clinical goals for the shift include Pt will remain HDS and VSS throughout shift end on 1/21/24 @1900

## 2024-01-21 NOTE — PROGRESS NOTES
Senait Marshall is a 53 y.o. female on day 3 of admission presenting with Sickle cell disease with crisis and other complication (CMS/HCC).    Subjective   Senait is doing fine this morning. She states her pain is improving overall. We discussed weaning her pain meds down to 1mg q3h PRN for possible DC tomorrow, pt agreed to this plan. Denies chest pain, fever/chills, nausea/vomiting, H/A or SOB. States her last BM was on Wednesday, discussed escalating her bowel regimen today, pt agreed.      Objective  Physical Exam  Constitutional:       Appearance: Normal appearance.   HENT:      Head: Normocephalic and atraumatic.      Nose: Nose normal.      Mouth: Mucous membranes are moist.      Pharynx: Oropharynx is clear.   Eyes:      Extraocular Movements: Extraocular movements intact.      Pupils: Pupils are equal, round, and reactive to light.   Cardiovascular:      Rate and Rhythm: Normal rate and regular rhythm.      Pulses: Normal pulses.      Heart sounds: Normal heart sounds.   Pulmonary:      Effort: Pulmonary effort is normal.      Breath sounds: Normal breath sounds.   Abdominal:      General: Bowel sounds are normal.      Palpations: Abdomen is soft.   Musculoskeletal:         General: Normal range of motion.   Skin:     General: Skin is warm.   Neurological:      General: No focal deficit present.      Mental Status: She is alert and oriented to person, place, and time. Mental status is at baseline.   Psychiatric:         Mood and Affect: Mood normal.         Behavior: Behavior normal.     Last Recorded Vitals  Blood pressure 119/59, pulse 55, temperature 36.3 °C (97.3 °F), resp. rate 14, SpO2 98 %.  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 Pt has had complications with past outpatient exchanges including SVT following line placement requiring admission and hypoxia/lethargy following exchange (found to have new pHTN). Patient is now admitted for observation following apheresis line placement and RBCEx. Apheresis was placed on 1/19 and s/p inpt routine exchange. After her red cell exchange she was hypotensive with SBP <100, and fell asleep after the exchange likely from the Benadryl. She woke up and was not sedated with repeated SBP in 120. 1/21 INR 2.7, plan 2.5mg warfarin once per pharmacy and trend INR. DC pending improvement in pain.      # HbSC disease   - Now in crisis   - Managed through routine RBC exchanges q6 weeks, most recent prior to admit was on 12/8/23  - OARRS reviewed, no aberrant behavior noted  - Hgb baseline ~9, 1/21 Hgb 7.8 - no indication for simple transfusion at this time as pt asymptomatic  - new care plan from 12/6/23- For mild to moderate pain: Dilaudid 0.5mg IVP q3h as needed, for moderate to severe pain Dilaudid 1- 1.5mg q3h PRN  - Admitted for observation following line/red cell exchange, however patient is also currently in crisis  - 1/19 IV Dilaudid dose decreased from 1.5mg to 1 mg q3 after hypotension/bradypnea episode overnight, which resolved with no intervention  - (1/20-1/21): s/p Increase IV Dilaudid to 1.5mg q3 hrs PRN   - (1/21-current) start IV dilaudid 1mg q3h PRN for pain  - s/p Bowel regimen for opioid-induced constipation PRN  - (1/21-current) start scheduled miralax and docusenna 2 tabs daily  - Continue home Duloxetine 40mg daily, PO Zofran PRN, Folic Acid 1mg daily, and MVI daily  - S/P apheresis line placement (IR) on 1/19 and  routine RBCEx 1/19  - Exchange labs 1/18- HgbS 28.4%. Post exchange Hgb S pending       # Recent episode of dizziness  - Now resolved   - may be related to hypotension  - s/p fall at home on 1/14  - orthostatic vitals negative (1/18)  - EKG showed no new abnormal activity  - Repeat ECHO ordered, pending     # Recently diagnosed pHTN   - 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 Yi and Dr. 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 Reduced inpatient on 1/20 for hypotension, start Metoprolol 12.5 mg BID   - Stable on tele since admission, discontinue cardiac monitor   - Follows with Cardiology outpt, will request outpatient follow up post discharge to adjust meds given persistent hypotension     # DVT/ PE/ SVC Thrombus  - Hx SVC stricture s/p SVC angioplasty  - B/l Upper Extremity and Facial Swelling d/t partial filling defect within the SVC and a thrombus of the SVC complicated by bilateral Mediport catheters. On lifelong anticoagulation, DOAC discouraged due to high risk of clotting.   - INR 3.7,  (1/19)  - Held coumadin dose (1/19) post-line placement, will monitor daily and dose base on INR  - Patient monitors INR with at home Coagcheck device  - Follows with Coumadin clinic outpatient  - Caution with fluids  - Cont Lasix 20mg every other day   - 1/20 s/p 2.5mg warfarin  - okay per pharmacy  - 1/21 INR 2.7, Per pharmacy give one more dose of warfarin 2.5mg and trend INR, plan to restart home warfarin 5mg daily 1/22 if INR trending down     # CHARLES on CKDII- improving  - Baseline ~ SCr <2. Cr 1.17 on admission  - Thought to be related to hypotension mediated by metoprolol (hx of SVT) and furosemide (hx of heart failure)  - D5 1/2@50 (1-18-1/19 for 12 hours)  - 1/21: sCr 1.01  - Follows with nephrology outpt - last appointment 1/11     # CAN  - Continue home CPAP   - Continue home Albuterol PRN     # H/O Cauda Equine syndrome  - Follows Dr. Delacruz as outpatient     # DISPO:  - Full Code, confirmed on admit  - DC home with resumed O2 pending monitoring post red cell exchange   - FUV Cardio 1/2, Rheum 4/15, Cardio 7/15, sickle cell requested      I spent >60 minutes in the professional and overall care of this patient.    Assessment and plan discussed with attending physician Dr. Nuñez, PAGualbertoC

## 2024-01-22 ENCOUNTER — APPOINTMENT (OUTPATIENT)
Dept: CARDIOLOGY | Facility: HOSPITAL | Age: 54
DRG: 812 | End: 2024-01-22
Payer: COMMERCIAL

## 2024-01-22 ENCOUNTER — APPOINTMENT (OUTPATIENT)
Dept: VASCULAR MEDICINE | Facility: HOSPITAL | Age: 54
DRG: 812 | End: 2024-01-22
Payer: COMMERCIAL

## 2024-01-22 LAB
ALBUMIN SERPL BCP-MCNC: 3.2 G/DL (ref 3.4–5)
ALP SERPL-CCNC: 81 U/L (ref 33–110)
ALT SERPL W P-5'-P-CCNC: 5 U/L (ref 7–45)
ANION GAP SERPL CALC-SCNC: <7 MMOL/L (ref 10–20)
APTT PPP: 40 SECONDS (ref 27–38)
AST SERPL W P-5'-P-CCNC: 11 U/L (ref 9–39)
BASOPHILS # BLD AUTO: 0.02 X10*3/UL (ref 0–0.1)
BASOPHILS NFR BLD AUTO: 0.3 %
BILIRUB SERPL-MCNC: 0.7 MG/DL (ref 0–1.2)
BUN SERPL-MCNC: 15 MG/DL (ref 6–23)
CALCIUM SERPL-MCNC: 8.5 MG/DL (ref 8.6–10.6)
CHLORIDE SERPL-SCNC: 101 MMOL/L (ref 98–107)
CO2 SERPL-SCNC: 35 MMOL/L (ref 21–32)
CREAT SERPL-MCNC: 0.96 MG/DL (ref 0.5–1.05)
EGFRCR SERPLBLD CKD-EPI 2021: 70 ML/MIN/1.73M*2
EOSINOPHIL # BLD AUTO: 0.5 X10*3/UL (ref 0–0.7)
EOSINOPHIL NFR BLD AUTO: 7.2 %
ERYTHROCYTE [DISTWIDTH] IN BLOOD BY AUTOMATED COUNT: 24.4 % (ref 11.5–14.5)
GLUCOSE SERPL-MCNC: 92 MG/DL (ref 74–99)
HCT VFR BLD AUTO: 25.1 % (ref 36–46)
HGB BLD-MCNC: 7.9 G/DL (ref 12–16)
HGB RETIC QN: 21 PG (ref 28–38)
HYPOCHROMIA BLD QL SMEAR: NORMAL
IMM GRANULOCYTES # BLD AUTO: 0.04 X10*3/UL (ref 0–0.7)
IMM GRANULOCYTES NFR BLD AUTO: 0.6 % (ref 0–0.9)
IMMATURE RETIC FRACTION: 22.4 %
INR PPP: 2.1 (ref 0.9–1.1)
LDH SERPL L TO P-CCNC: 164 U/L (ref 84–246)
LYMPHOCYTES # BLD AUTO: 1 X10*3/UL (ref 1.2–4.8)
LYMPHOCYTES NFR BLD AUTO: 14.3 %
MCH RBC QN AUTO: 25.2 PG (ref 26–34)
MCHC RBC AUTO-ENTMCNC: 31.5 G/DL (ref 32–36)
MCV RBC AUTO: 80 FL (ref 80–100)
MONOCYTES # BLD AUTO: 0.89 X10*3/UL (ref 0.1–1)
MONOCYTES NFR BLD AUTO: 12.8 %
NEUTROPHILS # BLD AUTO: 4.53 X10*3/UL (ref 1.2–7.7)
NEUTROPHILS NFR BLD AUTO: 64.8 %
NRBC BLD-RTO: 14.6 /100 WBCS (ref 0–0)
PLATELET # BLD AUTO: 197 X10*3/UL (ref 150–450)
POLYCHROMASIA BLD QL SMEAR: NORMAL
POTASSIUM SERPL-SCNC: 4.7 MMOL/L (ref 3.5–5.3)
PROT SERPL-MCNC: 5.7 G/DL (ref 6.4–8.2)
PROTHROMBIN TIME: 23.4 SECONDS (ref 9.8–12.8)
RBC # BLD AUTO: 3.13 X10*6/UL (ref 4–5.2)
RBC MORPH BLD: NORMAL
RETICS #: 0.2 X10*6/UL (ref 0.02–0.08)
RETICS/RBC NFR AUTO: 6.3 % (ref 0.5–2)
SCHISTOCYTES BLD QL SMEAR: NORMAL
SODIUM SERPL-SCNC: 136 MMOL/L (ref 136–145)
TARGETS BLD QL SMEAR: NORMAL
WBC # BLD AUTO: 7 X10*3/UL (ref 4.4–11.3)

## 2024-01-22 PROCEDURE — 2500000004 HC RX 250 GENERAL PHARMACY W/ HCPCS (ALT 636 FOR OP/ED)

## 2024-01-22 PROCEDURE — 36415 COLL VENOUS BLD VENIPUNCTURE: CPT

## 2024-01-22 PROCEDURE — 85045 AUTOMATED RETICULOCYTE COUNT: CPT

## 2024-01-22 PROCEDURE — 2500000001 HC RX 250 WO HCPCS SELF ADMINISTERED DRUGS (ALT 637 FOR MEDICARE OP)

## 2024-01-22 PROCEDURE — 93971 EXTREMITY STUDY: CPT

## 2024-01-22 PROCEDURE — 85610 PROTHROMBIN TIME: CPT

## 2024-01-22 PROCEDURE — 2500000004 HC RX 250 GENERAL PHARMACY W/ HCPCS (ALT 636 FOR OP/ED): Performed by: INTERNAL MEDICINE

## 2024-01-22 PROCEDURE — 80053 COMPREHEN METABOLIC PANEL: CPT

## 2024-01-22 PROCEDURE — 2500000001 HC RX 250 WO HCPCS SELF ADMINISTERED DRUGS (ALT 637 FOR MEDICARE OP): Performed by: PHYSICIAN ASSISTANT

## 2024-01-22 PROCEDURE — 93971 EXTREMITY STUDY: CPT | Performed by: INTERNAL MEDICINE

## 2024-01-22 PROCEDURE — 99232 SBSQ HOSP IP/OBS MODERATE 35: CPT | Performed by: INTERNAL MEDICINE

## 2024-01-22 PROCEDURE — 1170000001 HC PRIVATE ONCOLOGY ROOM DAILY

## 2024-01-22 PROCEDURE — 83615 LACTATE (LD) (LDH) ENZYME: CPT

## 2024-01-22 PROCEDURE — 85025 COMPLETE CBC W/AUTO DIFF WBC: CPT

## 2024-01-22 RX ORDER — WARFARIN SODIUM 5 MG/1
5 TABLET ORAL DAILY
Status: DISCONTINUED | OUTPATIENT
Start: 2024-01-22 | End: 2024-01-24 | Stop reason: HOSPADM

## 2024-01-22 RX ORDER — HYDROMORPHONE HYDROCHLORIDE 1 MG/ML
1.5 INJECTION, SOLUTION INTRAMUSCULAR; INTRAVENOUS; SUBCUTANEOUS
Status: DISCONTINUED | OUTPATIENT
Start: 2024-01-22 | End: 2024-01-23

## 2024-01-22 RX ADMIN — DULOXETINE HYDROCHLORIDE 40 MG: 20 CAPSULE, DELAYED RELEASE ORAL at 21:55

## 2024-01-22 RX ADMIN — Medication 1 TABLET: at 09:21

## 2024-01-22 RX ADMIN — OXYCODONE HYDROCHLORIDE AND ACETAMINOPHEN 500 MG: 500 TABLET ORAL at 09:21

## 2024-01-22 RX ADMIN — WARFARIN SODIUM 5 MG: 5 TABLET ORAL at 18:42

## 2024-01-22 RX ADMIN — HYDROMORPHONE HYDROCHLORIDE 1 MG: 1 INJECTION, SOLUTION INTRAMUSCULAR; INTRAVENOUS; SUBCUTANEOUS at 09:21

## 2024-01-22 RX ADMIN — HYDROMORPHONE HYDROCHLORIDE 1 MG: 1 INJECTION, SOLUTION INTRAMUSCULAR; INTRAVENOUS; SUBCUTANEOUS at 00:38

## 2024-01-22 RX ADMIN — HYDROMORPHONE HYDROCHLORIDE 1.5 MG: 1 INJECTION, SOLUTION INTRAMUSCULAR; INTRAVENOUS; SUBCUTANEOUS at 21:55

## 2024-01-22 RX ADMIN — HYDROMORPHONE HYDROCHLORIDE 1 MG: 1 INJECTION, SOLUTION INTRAMUSCULAR; INTRAVENOUS; SUBCUTANEOUS at 03:48

## 2024-01-22 RX ADMIN — HYDROMORPHONE HYDROCHLORIDE 1 MG: 1 INJECTION, SOLUTION INTRAMUSCULAR; INTRAVENOUS; SUBCUTANEOUS at 12:31

## 2024-01-22 RX ADMIN — METOPROLOL TARTRATE 12.5 MG: 25 TABLET, FILM COATED ORAL at 22:02

## 2024-01-22 RX ADMIN — SENNOSIDES AND DOCUSATE SODIUM 2 TABLET: 8.6; 5 TABLET ORAL at 21:55

## 2024-01-22 RX ADMIN — FOLIC ACID 1 MG: 1 TABLET ORAL at 09:21

## 2024-01-22 RX ADMIN — HYDROMORPHONE HYDROCHLORIDE 1 MG: 1 INJECTION, SOLUTION INTRAMUSCULAR; INTRAVENOUS; SUBCUTANEOUS at 06:25

## 2024-01-22 RX ADMIN — HYDROMORPHONE HYDROCHLORIDE 1.5 MG: 1 INJECTION, SOLUTION INTRAMUSCULAR; INTRAVENOUS; SUBCUTANEOUS at 18:42

## 2024-01-22 RX ADMIN — POLYETHYLENE GLYCOL 3350 17 G: 17 POWDER, FOR SOLUTION ORAL at 09:21

## 2024-01-22 RX ADMIN — METOPROLOL TARTRATE 12.5 MG: 25 TABLET, FILM COATED ORAL at 09:21

## 2024-01-22 RX ADMIN — HYDROMORPHONE HYDROCHLORIDE 1.5 MG: 1 INJECTION, SOLUTION INTRAMUSCULAR; INTRAVENOUS; SUBCUTANEOUS at 15:39

## 2024-01-22 ASSESSMENT — PAIN SCALES - GENERAL
PAINLEVEL_OUTOF10: 9
PAINLEVEL_OUTOF10: 5 - MODERATE PAIN
PAINLEVEL_OUTOF10: 7
PAINLEVEL_OUTOF10: 7
PAINLEVEL_OUTOF10: 9
PAINLEVEL_OUTOF10: 10 - WORST POSSIBLE PAIN
PAINLEVEL_OUTOF10: 5 - MODERATE PAIN
PAINLEVEL_OUTOF10: 9
PAINLEVEL_OUTOF10: 8

## 2024-01-22 ASSESSMENT — COGNITIVE AND FUNCTIONAL STATUS - GENERAL
MOBILITY SCORE: 24
DAILY ACTIVITIY SCORE: 24
DAILY ACTIVITIY SCORE: 24
MOBILITY SCORE: 24

## 2024-01-22 ASSESSMENT — PAIN - FUNCTIONAL ASSESSMENT
PAIN_FUNCTIONAL_ASSESSMENT: 0-10

## 2024-01-22 NOTE — CARE PLAN
The patient's goals for the shift include      The clinical goals for the shift include pt will remain safe and free from injury on shift 01/22/2024 [id number]      Problem: Fall/Injury  Goal: Not fall by end of shift  Outcome: Progressing  Goal: Be free from injury by end of the shift  Outcome: Progressing  Goal: Verbalize understanding of personal risk factors for fall in the hospital  Outcome: Progressing  Goal: Verbalize understanding of risk factor reduction measures to prevent injury from fall in the home  Outcome: Progressing  Goal: Use assistive devices by end of the shift  Outcome: Progressing  Goal: Pace activities to prevent fatigue by end of the shift  Outcome: Progressing     Problem: Pain  Goal: Takes deep breaths with improved pain control throughout the shift  Outcome: Progressing  Goal: Turns in bed with improved pain control throughout the shift  Outcome: Progressing  Goal: Walks with improved pain control throughout the shift  Outcome: Progressing  Goal: Performs ADL's with improved pain control throughout shift  Outcome: Progressing  Goal: Participates in PT with improved pain control throughout the shift  Outcome: Progressing  Goal: Free from opioid side effects throughout the shift  Outcome: Progressing  Goal: Free from acute confusion related to pain meds throughout the shift  Outcome: Progressing     Problem: Pain  Goal: My pain/discomfort is manageable  Outcome: Progressing     Problem: Safety  Goal: Patient will be injury free during hospitalization  Outcome: Progressing  Goal: I will remain free of falls  Outcome: Progressing     Problem: Daily Care  Goal: Daily care needs are met  Outcome: Progressing     Problem: Psychosocial Needs  Goal: Demonstrates ability to cope with hospitalization/illness  Outcome: Progressing  Goal: Collaborate with me, my family, and caregiver to identify my specific goals  Outcome: Progressing     Problem: Discharge Barriers  Goal: My discharge needs are  met  Outcome: Progressing

## 2024-01-22 NOTE — PROGRESS NOTES
Senait Narvaez is a 53 y.o. female on day 4 of admission presenting with Sickle cell disease with crisis and other complication (CMS/HCC).    Subjective   Pain persists, about the same.   Ambulating. C/o of RLE swelling.   I have reviewed histories, allergies and medications have been reviewed and there are no changes    Objective   Review of Systems  Physical Exam  Vitals:    01/22/24 0111   BP: 118/52   Pulse: 74   Resp: 16   Temp: 36.8 °C (98.2 °F)   SpO2: 100%   Constitutional:       Appearance: Normal appearance.   HENT:      Head: Normocephalic and atraumatic.      Nose: Nose normal.      Mouth: Mucous membranes are moist.      Pharynx: Oropharynx is clear.   Eyes:      Extraocular Movements: Extraocular movements intact.      Pupils: Pupils are equal, round, and reactive to light.   Cardiovascular:      Rate and Rhythm: Normal rate and regular rhythm.      Pulses: Normal pulses.      Heart sounds: Normal heart sounds.   Pulmonary:      Effort: Pulmonary effort is normal.      Breath sounds: Normal breath sounds.   Abdominal:      General: Bowel sounds are normal.      Palpations: Abdomen is soft.   Musculoskeletal:         General: Normal range of motion. Some degree of RLE swelling, unable to tell if chronic in nature.   Skin:     General: Skin is warm.   Neurological:      General: No focal deficit present.      Mental Status: She is alert and oriented to person, place, and time. Mental status is at baseline.   Psychiatric:         Mood and Affect: Mood normal.         Behavior: Behavior normal.     Last Recorded Vitals  Blood pressure 118/52, pulse 74, temperature 36.8 °C (98.2 °F), resp. rate 16, SpO2 100 %.  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 Pt has had complications with past outpatient exchanges including SVT following line placement requiring admission and hypoxia/lethargy following exchange (found to have new pHTN). Patient is now admitted for observation following apheresis line placement and RBCEx. Apheresis was placed on 1/19 and s/p inpt routine exchange. After her red cell exchange, she was hypotensive with SBP <100, and fell asleep after the exchange likely from the Benadryl. She woke up and was not sedated with repeated SBP in 120 ----1/21 INR 2.7, plan 2.5mg warfarin once per pharmacy and trend INR. DC pending improvement in pain.      # HbSC disease   - Presentation in Crisis  - Managed through routine RBC exchanges q6 weeks, most recent prior to admit was on 12/8/23  - OARRS reviewed, no aberrant behavior noted  - Hgb baseline ~9, 1/21 Hgb 7.8 - no indication for simple transfusion at this time as pt asymptomatic  - new care plan from 12/6/23- For mild to moderate pain: Dilaudid 0.5mg IVP q3h as needed, for moderate to severe pain Dilaudid 1- 1.5mg q3h PRN  - Admitted for observation following line/red cell exchange - also in crisis  - 1/19 IV Dilaudid dose decreased from 1.5mg to 1 mg q3 after hypotension/bradypnea episode overnight, which resolved with no intervention  - (1/20-1/21): s/p Increase IV Dilaudid to 1.5mg q3 hrs PRN   - (1/21-current) start IV dilaudid 1mg q3h PRN for pain  - s/p Bowel regimen for opioid-induced constipation PRN  - (1/21-current) start scheduled miralax and docusenna 2 tabs daily  - Continue home Duloxetine 40mg daily, PO Zofran PRN, Folic Acid 1mg  daily, and MVI daily  - S/P apheresis line placement (IR) on 1/19 and routine RBCEx 1/19  - Exchange labs 1/18- HgbS 28.4%.     # Recent episode of dizziness likely 2/2 to Hypotension  - s/p fall at home on 1/14  - orthostatic vitals negative (1/18)  - EKG showed no new abnormal activity-  rep. Echo      # Recently diagnosed pHTN   - 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 Yi and Dr. 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 ECHO  - Takes Metoprolol 25mg BID. Reduced inpatient on 1/20 for hypotension ---Metoprolol 12.5 mg BID   - Stable on tele since admission, discontinue cardiac monitor   - Follows with Cardiology outpt, will request outpatient follow up post discharge to adjust meds given persistent hypotension     # DVT/ PE/ SVC Thrombus  - Hx SVC stricture s/p SVC angioplasty  - B/l Upper Extremity and Facial Swelling d/t partial filling defect within the SVC and a thrombus of the SVC complicated by bilateral Mediport catheters. On lifelong anticoagulation, DOAC discouraged due to high risk of clotting.   - INR 3.7,  (1/19)  - Held coumadin dose (1/19) post-line placement, will monitor daily and dose base on INR  - Patient monitors INR with at home Coagcheck device  - Follows with Coumadin clinic outpatient  - Caution with fluids  - Cont Lasix 20mg every other day   - 1/20 s/p 2.5mg warfarin - okay per  pharmacy  - 1/21 INR 2.7, Per pharmacy give one more dose of warfarin 2.5mg and trend INR - restarted on  home warfarin 5mg daily 1/22 as INR is trending down. Confirmed with Pharmacy.     - Ultrasound RLE: 1/22/24  for c/o RLE swelling      # CHARLES on CKDII- improving  - Baseline ~ SCr <2. Cr 1.17 on admission  - Thought to be related to hypotension mediated by metoprolol (hx of SVT) and furosemide (hx of heart failure)  - D5 1/2@50 (1-18-1/19 for 12 hours)  - 1/21: sCr 1.01  - 1/22: sCr. 0.96   - Follows with nephrology outpt - last appointment 1/11     # CAN  - Continue home CPAP   - Continue home Albuterol PRN     # H/O Cauda Equine syndrome  - Follows Dr. Delacruz as outpatient     # DISPO:  - Full Code, confirmed on admit  - DC home with resumed O2 pending monitoring post red cell exchange   - FUV Cardio 1/2, Rheum 4/15, Cardio 7/15, sickle cell requested        I spent 60 minutes in the professional and overall care of this patient.    Oriana Kong MD

## 2024-01-23 ENCOUNTER — APPOINTMENT (OUTPATIENT)
Dept: CARDIOLOGY | Facility: HOSPITAL | Age: 54
DRG: 812 | End: 2024-01-23
Payer: COMMERCIAL

## 2024-01-23 LAB
1OH-MIDAZOLAM UR CFM-MCNC: <25 NG/ML
6MAM UR CFM-MCNC: <25 NG/ML
7AMINOCLONAZEPAM UR CFM-MCNC: <25 NG/ML
A-OH ALPRAZ UR CFM-MCNC: <25 NG/ML
ALBUMIN SERPL BCP-MCNC: 3.5 G/DL (ref 3.4–5)
ALP SERPL-CCNC: 89 U/L (ref 33–110)
ALPRAZ UR CFM-MCNC: <25 NG/ML
ALT SERPL W P-5'-P-CCNC: 6 U/L (ref 7–45)
ANION GAP SERPL CALC-SCNC: 10 MMOL/L (ref 10–20)
AORTIC VALVE MEAN GRADIENT: 4.2 MMHG
AORTIC VALVE PEAK VELOCITY: 1.74 M/S
APTT PPP: 35 SECONDS (ref 27–38)
AST SERPL W P-5'-P-CCNC: 14 U/L (ref 9–39)
AV PEAK GRADIENT: 12.1 MMHG
AVA (PEAK VEL): 2.86 CM2
AVA (VTI): 3.53 CM2
BASOPHILS # BLD MANUAL: 0.07 X10*3/UL (ref 0–0.1)
BASOPHILS NFR BLD MANUAL: 0.9 %
BILIRUB SERPL-MCNC: 1 MG/DL (ref 0–1.2)
BUN SERPL-MCNC: 13 MG/DL (ref 6–23)
CALCIUM SERPL-MCNC: 9 MG/DL (ref 8.6–10.6)
CARBOXYTHC UR-MCNC: 22 NG/ML
CHLORDIAZEP UR CFM-MCNC: <25 NG/ML
CHLORIDE SERPL-SCNC: 99 MMOL/L (ref 98–107)
CLONAZEPAM UR CFM-MCNC: <25 NG/ML
CO2 SERPL-SCNC: 34 MMOL/L (ref 21–32)
CODEINE UR CFM-MCNC: <50 NG/ML
CREAT SERPL-MCNC: 0.86 MG/DL (ref 0.5–1.05)
DIAZEPAM UR CFM-MCNC: <25 NG/ML
EDDP UR CFM-MCNC: <25 NG/ML
EGFRCR SERPLBLD CKD-EPI 2021: 80 ML/MIN/1.73M*2
EOSINOPHIL # BLD MANUAL: 0.45 X10*3/UL (ref 0–0.7)
EOSINOPHIL NFR BLD MANUAL: 5.4 %
ERYTHROCYTE [DISTWIDTH] IN BLOOD BY AUTOMATED COUNT: 25.1 % (ref 11.5–14.5)
FENTANYL UR CFM-MCNC: <2.5 NG/ML
GLUCOSE SERPL-MCNC: 84 MG/DL (ref 74–99)
HCT VFR BLD AUTO: 27.4 % (ref 36–46)
HGB BLD-MCNC: 8.7 G/DL (ref 12–16)
HYDROCODONE CTO UR CFM-MCNC: <25 NG/ML
HYDROMORPHONE UR CFM-MCNC: <25 NG/ML
HYPOCHROMIA BLD QL SMEAR: NORMAL
IMM GRANULOCYTES # BLD AUTO: 0.03 X10*3/UL (ref 0–0.7)
IMM GRANULOCYTES NFR BLD AUTO: 0.4 % (ref 0–0.9)
INR PPP: 1.8 (ref 0.9–1.1)
LEFT ATRIUM VOLUME AREA LENGTH INDEX BSA: 34.5 ML/M2
LEFT VENTRICLE INTERNAL DIMENSION DIASTOLE: 4.7 CM (ref 3.5–6)
LEFT VENTRICULAR OUTFLOW TRACT DIAMETER: 2.08 CM
LORAZEPAM UR CFM-MCNC: <25 NG/ML
LYMPHOCYTES # BLD MANUAL: 1.41 X10*3/UL (ref 1.2–4.8)
LYMPHOCYTES NFR BLD MANUAL: 17 %
MCH RBC QN AUTO: 26 PG (ref 26–34)
MCHC RBC AUTO-ENTMCNC: 31.8 G/DL (ref 32–36)
MCV RBC AUTO: 82 FL (ref 80–100)
METHADONE UR CFM-MCNC: <25 NG/ML
MIDAZOLAM UR CFM-MCNC: <25 NG/ML
MITRAL VALVE E/A RATIO: 1.19
MITRAL VALVE E/E' RATIO: 6.51
MONOCYTES # BLD MANUAL: 0.59 X10*3/UL (ref 0.1–1)
MONOCYTES NFR BLD MANUAL: 7.1 %
MORPHINE UR CFM-MCNC: <50 NG/ML
NEUTS SEG # BLD MANUAL: 5.78 X10*3/UL (ref 1.2–7)
NEUTS SEG NFR BLD MANUAL: 69.6 %
NORDIAZEPAM UR CFM-MCNC: <25 NG/ML
NORFENTANYL UR CFM-MCNC: <2.5 NG/ML
NORHYDROCODONE UR CFM-MCNC: <25 NG/ML
NOROXYCODONE UR CFM-MCNC: >1000 NG/ML
NORTRAMADOL UR-MCNC: <50 NG/ML
NRBC BLD-RTO: 8.6 /100 WBCS (ref 0–0)
OXAZEPAM UR CFM-MCNC: <25 NG/ML
OXYCODONE UR CFM-MCNC: >2500 NG/ML
OXYMORPHONE UR CFM-MCNC: >2500 NG/ML
PLATELET # BLD AUTO: 191 X10*3/UL (ref 150–450)
POTASSIUM SERPL-SCNC: 4.3 MMOL/L (ref 3.5–5.3)
PROT SERPL-MCNC: 6.8 G/DL (ref 6.4–8.2)
PROTHROMBIN TIME: 20.9 SECONDS (ref 9.8–12.8)
RBC # BLD AUTO: 3.35 X10*6/UL (ref 4–5.2)
RBC MORPH BLD: NORMAL
RIGHT VENTRICLE FREE WALL PEAK S': 15 CM/S
SODIUM SERPL-SCNC: 139 MMOL/L (ref 136–145)
STOMATOCYTES BLD QL SMEAR: NORMAL
TARGETS BLD QL SMEAR: NORMAL
TEMAZEPAM UR CFM-MCNC: <25 NG/ML
TOTAL CELLS COUNTED BLD: 112
TRAMADOL UR CFM-MCNC: <50 NG/ML
TRICUSPID ANNULAR PLANE SYSTOLIC EXCURSION: 2.7 CM
WBC # BLD AUTO: 8.3 X10*3/UL (ref 4.4–11.3)
ZOLPIDEM UR CFM-MCNC: <25 NG/ML
ZOLPIDEM UR-MCNC: <25 NG/ML

## 2024-01-23 PROCEDURE — 85610 PROTHROMBIN TIME: CPT

## 2024-01-23 PROCEDURE — 2500000001 HC RX 250 WO HCPCS SELF ADMINISTERED DRUGS (ALT 637 FOR MEDICARE OP)

## 2024-01-23 PROCEDURE — 85027 COMPLETE CBC AUTOMATED: CPT

## 2024-01-23 PROCEDURE — 2500000004 HC RX 250 GENERAL PHARMACY W/ HCPCS (ALT 636 FOR OP/ED): Performed by: INTERNAL MEDICINE

## 2024-01-23 PROCEDURE — 36415 COLL VENOUS BLD VENIPUNCTURE: CPT

## 2024-01-23 PROCEDURE — 1170000001 HC PRIVATE ONCOLOGY ROOM DAILY

## 2024-01-23 PROCEDURE — 93306 TTE W/DOPPLER COMPLETE: CPT | Performed by: INTERNAL MEDICINE

## 2024-01-23 PROCEDURE — 2500000001 HC RX 250 WO HCPCS SELF ADMINISTERED DRUGS (ALT 637 FOR MEDICARE OP): Performed by: PHYSICIAN ASSISTANT

## 2024-01-23 PROCEDURE — 2500000004 HC RX 250 GENERAL PHARMACY W/ HCPCS (ALT 636 FOR OP/ED)

## 2024-01-23 PROCEDURE — 2500000004 HC RX 250 GENERAL PHARMACY W/ HCPCS (ALT 636 FOR OP/ED): Performed by: PHYSICIAN ASSISTANT

## 2024-01-23 PROCEDURE — 85007 BL SMEAR W/DIFF WBC COUNT: CPT

## 2024-01-23 PROCEDURE — 99233 SBSQ HOSP IP/OBS HIGH 50: CPT

## 2024-01-23 PROCEDURE — 84075 ASSAY ALKALINE PHOSPHATASE: CPT

## 2024-01-23 PROCEDURE — 93306 TTE W/DOPPLER COMPLETE: CPT

## 2024-01-23 RX ORDER — METOPROLOL TARTRATE 25 MG/1
25 TABLET, FILM COATED ORAL 2 TIMES DAILY
Status: DISCONTINUED | OUTPATIENT
Start: 2024-01-23 | End: 2024-01-23

## 2024-01-23 RX ORDER — HYDROMORPHONE HYDROCHLORIDE 1 MG/ML
1 INJECTION, SOLUTION INTRAMUSCULAR; INTRAVENOUS; SUBCUTANEOUS
Status: DISCONTINUED | OUTPATIENT
Start: 2024-01-23 | End: 2024-01-24

## 2024-01-23 RX ORDER — POLYETHYLENE GLYCOL 3350 17 G/17G
17 POWDER, FOR SOLUTION ORAL ONCE
Status: DISCONTINUED | OUTPATIENT
Start: 2024-01-23 | End: 2024-01-24 | Stop reason: HOSPADM

## 2024-01-23 RX ORDER — METOPROLOL TARTRATE 25 MG/1
12.5 TABLET, FILM COATED ORAL 2 TIMES DAILY
Status: DISCONTINUED | OUTPATIENT
Start: 2024-01-23 | End: 2024-01-24 | Stop reason: HOSPADM

## 2024-01-23 RX ADMIN — Medication 1 TABLET: at 08:54

## 2024-01-23 RX ADMIN — HYDROMORPHONE HYDROCHLORIDE 1.5 MG: 1 INJECTION, SOLUTION INTRAMUSCULAR; INTRAVENOUS; SUBCUTANEOUS at 00:48

## 2024-01-23 RX ADMIN — HYDROMORPHONE HYDROCHLORIDE 1 MG: 1 INJECTION, SOLUTION INTRAMUSCULAR; INTRAVENOUS; SUBCUTANEOUS at 15:26

## 2024-01-23 RX ADMIN — HYDROMORPHONE HYDROCHLORIDE 1 MG: 1 INJECTION, SOLUTION INTRAMUSCULAR; INTRAVENOUS; SUBCUTANEOUS at 21:12

## 2024-01-23 RX ADMIN — OXYCODONE HYDROCHLORIDE AND ACETAMINOPHEN 500 MG: 500 TABLET ORAL at 08:54

## 2024-01-23 RX ADMIN — HYDROMORPHONE HYDROCHLORIDE 1.5 MG: 1 INJECTION, SOLUTION INTRAMUSCULAR; INTRAVENOUS; SUBCUTANEOUS at 08:59

## 2024-01-23 RX ADMIN — HYDROMORPHONE HYDROCHLORIDE 1 MG: 1 INJECTION, SOLUTION INTRAMUSCULAR; INTRAVENOUS; SUBCUTANEOUS at 18:16

## 2024-01-23 RX ADMIN — HYDROMORPHONE HYDROCHLORIDE 1 MG: 1 INJECTION, SOLUTION INTRAMUSCULAR; INTRAVENOUS; SUBCUTANEOUS at 12:15

## 2024-01-23 RX ADMIN — FUROSEMIDE 20 MG: 20 TABLET ORAL at 08:54

## 2024-01-23 RX ADMIN — METOPROLOL TARTRATE 12.5 MG: 25 TABLET, FILM COATED ORAL at 08:54

## 2024-01-23 RX ADMIN — ALTEPLASE 1 MG: 2.2 INJECTION, POWDER, LYOPHILIZED, FOR SOLUTION INTRAVENOUS at 13:35

## 2024-01-23 RX ADMIN — HYDROMORPHONE HYDROCHLORIDE 1.5 MG: 1 INJECTION, SOLUTION INTRAMUSCULAR; INTRAVENOUS; SUBCUTANEOUS at 04:52

## 2024-01-23 RX ADMIN — PERFLUTREN 4 ML OF DILUTION: 6.52 INJECTION, SUSPENSION INTRAVENOUS at 17:03

## 2024-01-23 RX ADMIN — METOPROLOL TARTRATE 12.5 MG: 25 TABLET, FILM COATED ORAL at 21:12

## 2024-01-23 RX ADMIN — SENNOSIDES AND DOCUSATE SODIUM 2 TABLET: 8.6; 5 TABLET ORAL at 21:12

## 2024-01-23 RX ADMIN — DULOXETINE HYDROCHLORIDE 40 MG: 20 CAPSULE, DELAYED RELEASE ORAL at 21:12

## 2024-01-23 RX ADMIN — POLYETHYLENE GLYCOL 3350 17 G: 17 POWDER, FOR SOLUTION ORAL at 08:54

## 2024-01-23 RX ADMIN — FOLIC ACID 1 MG: 1 TABLET ORAL at 08:54

## 2024-01-23 RX ADMIN — WARFARIN SODIUM 5 MG: 5 TABLET ORAL at 18:16

## 2024-01-23 ASSESSMENT — COGNITIVE AND FUNCTIONAL STATUS - GENERAL
MOBILITY SCORE: 23
MOBILITY SCORE: 24
DAILY ACTIVITIY SCORE: 24
MOBILITY SCORE: 24
CLIMB 3 TO 5 STEPS WITH RAILING: A LITTLE
DAILY ACTIVITIY SCORE: 24
DAILY ACTIVITIY SCORE: 24

## 2024-01-23 ASSESSMENT — PAIN - FUNCTIONAL ASSESSMENT
PAIN_FUNCTIONAL_ASSESSMENT: 0-10

## 2024-01-23 ASSESSMENT — PAIN SCALES - GENERAL
PAINLEVEL_OUTOF10: 9
PAINLEVEL_OUTOF10: 8
PAINLEVEL_OUTOF10: 9
PAINLEVEL_OUTOF10: 7
PAINLEVEL_OUTOF10: 6
PAINLEVEL_OUTOF10: 6
PAINLEVEL_OUTOF10: 7
PAINLEVEL_OUTOF10: 9
PAINLEVEL_OUTOF10: 9
PAINLEVEL_OUTOF10: 6

## 2024-01-23 ASSESSMENT — PAIN DESCRIPTION - ORIENTATION: ORIENTATION: RIGHT;LEFT

## 2024-01-23 ASSESSMENT — PAIN DESCRIPTION - LOCATION: LOCATION: GENERALIZED

## 2024-01-23 NOTE — PROGRESS NOTES
Senait Narvaez is a 53 y.o. female on day 5 of admission presenting with Sickle cell disease with crisis and other complication (CMS/HCC).    Subjective   Seen at bedside this morning. States that pain is the same and is generalized throughout body, rating 8/10. Denies CP or SOB. Discussed results of RLE US negative. Still complaining of right lower extremity pain and swelling. No rash or warmth on exam. Patient admits to taking her warfarin appropriately at home. Discussed keeping her pain regimen this same today with either weaning or discharging tomorrow. Patient agreeable to plan with no further questions. Denies chest pain, fever/chills, nausea/vomiting, H/A or SOB. States her last BM was on Wednesday, 1/17, discussed escalating her bowel regimen today with an extra dose of miralax, pt agreed.     Objective     Physical Exam  Vitals reviewed.   Constitutional:       Appearance: Normal appearance.   HENT:      Head: Normocephalic and atraumatic.      Nose: Nose normal.      Mouth/Throat:      Mouth: Mucous membranes are moist.      Pharynx: Oropharynx is clear.   Eyes:      Extraocular Movements: Extraocular movements intact.      Pupils: Pupils are equal, round, and reactive to light.   Cardiovascular:      Rate and Rhythm: Normal rate and regular rhythm.      Pulses: Normal pulses.      Heart sounds: Normal heart sounds.   Pulmonary:      Effort: Pulmonary effort is normal.      Breath sounds: Normal breath sounds.   Abdominal:      General: Bowel sounds are normal.      Palpations: Abdomen is soft.      Comments: aphaeresis catheter in the right groin in place. To be removed before discharge    Musculoskeletal:         General: Normal range of motion.   Skin:     General: Skin is warm.   Neurological:      General: No focal deficit present.      Mental Status: She is alert and oriented to person, place, and time. Mental status is at baseline.   Psychiatric:         Mood and Affect: Mood normal.         Behavior:  Behavior normal.       Last Recorded Vitals  Blood pressure 110/55, pulse 65, temperature 36 °C (96.8 °F), resp. rate 18, SpO2 100 %.  Intake/Output last 3 Shifts:  I/O last 3 completed shifts:  In: 820 [P.O.:820]  Out: -     Relevant Results  Scheduled medications  ascorbic acid, 500 mg, oral, Daily  DULoxetine, 40 mg, oral, Nightly  folic acid, 1 mg, oral, Daily  furosemide, 20 mg, oral, Every other day  metoprolol tartrate, 12.5 mg, oral, BID  multivitamin with minerals, 1 tablet, oral, Daily  polyethylene glycol, 17 g, oral, Daily  polyethylene glycol, 17 g, oral, Once  sennosides-docusate sodium, 2 tablet, oral, Nightly  [Held by provider] warfarin, 5 mg, oral, Daily  warfarin, 5 mg, oral, Daily    Continuous medications     PRN medications  PRN medications: albuterol, cyclobenzaprine, diphenhydrAMINE, fluticasone, HYDROmorphone, loratadine, ondansetron, oxygen, oxygen, traZODone       Lower extremity venous duplex right    Result Date: 1/22/2024   Right Lower Venous: No evidence of acute deep vein thrombus visualized in the right lower extremity. Pt. has the infusion line in upper thigh. Left Lower Venous: Left common femoral vein is negative for deep vein thrombus.  Comparison: Compared with study from 3/4/2021, no significant change.       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 Pt has had complications with past outpatient exchanges including SVT following line placement requiring  admission and hypoxia/lethargy following exchange (found to have new pHTN). Patient is now admitted for observation following apheresis line placement and RBCEx. Apheresis was placed on 1/19 s/p inpt routine exchange. After her red cell exchange she was hypotensive with SBP <100, and fell asleep after the exchange likely from the Benadryl. She woke up and was not sedated with repeated SBP in 120. 1/21 INR 2.7, plan 2.5mg warfarin once per pharmacy and trend INR. On 1/22, warfarin back to 5mg daily. DC pending improvement in pain, most likely 1/24.      # HbSC disease   - Now in pain crisis   - Managed through routine RBC exchanges q6 weeks, most recent prior to admit was on 12/8/23  - OARRS reviewed, no aberrant behavior noted  - Hgb baseline ~9, 1/21 Hgb 7.8 - no indication for simple transfusion at this time as pt asymptomatic  - new care plan from 12/6/23- For mild to moderate pain: Dilaudid 0.5mg IVP q3h as needed, for moderate to severe pain Dilaudid 1- 1.5mg q3h PRN  - Admitted for observation following line/red cell exchange, however patient is also currently in crisis  - 1/19 IV Dilaudid dose decreased from 1.5mg to 1 mg q3 after hypotension/bradypnea episode overnight, which resolved with no intervention  - (1/20-1/23) s/p IV Dilaudid to 1.5mg q3 hrs PRN   - (1/23- ) decreased to IV dilaudid 1mg q3h PRN for pain  - s/p Bowel regimen for opioid-induced constipation PRN  - (1/21-current) start scheduled miralax and docusenna 2 tabs daily  - Continue home Duloxetine 40mg daily, PO Zofran PRN, Folic Acid 1mg daily, and MVI daily  - S/P apheresis line placement (IR) on 1/19 and routine RBCEx 1/19  - Exchange labs 1/18- HgbS 28.4%. Post exchange Hgb S 11.4 (1/19)     # Recent episode of dizziness - resolved   - may be related to hypotension on 1/20   - s/p fall at home on 1/14  - orthostatic vitals negative (1/18)  - EKG showed no new abnormal activity  - Repeat ECHO ordered, pending     # Recently diagnosed pHTN    - 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 Yi and Dr. 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 Reduced inpatient on 1/20 for hypotension, s/p Metoprolol 12.5 mg BID (will take this at home until outpatient cardiology appointment)   - Stable on tele since admission, discontinue cardiac monitor   - Follows with Cardiology outpt, will request outpatient follow up post discharge to adjust meds given persistent hypotension  - Monitor BP/HR      # DVT/ PE/ SVC Thrombus  - Hx SVC stricture s/p SVC angioplasty  - B/l Upper Extremity and Facial Swelling d/t partial filling defect within the SVC and a thrombus of the SVC complicated by bilateral Mediport catheters. On lifelong anticoagulation, DOAC discouraged due to high risk of clotting.   - INR 3.7,  (1/19)  - Held coumadin dose (1/19) post-line placement, will monitor daily and dose base on INR  - Patient monitors INR with at home CoaMocavoheck device  - Follows with Coumadin clinic outpatient  - Caution with fluids  - Cont Lasix 20mg every other day   - 1/20 s/p 2.5mg warfarin - okay per pharmacy  - 1/21 INR 2.7, Per pharmacy give one more dose of warfarin 2.5mg and trend INR  - restarted home warfarin 5mg daily 1/22 since INR trending down  - Monitor INR daily      # CHARLES on CKDII- resolved   - Baseline ~ SCr  <2. Cr 1.17 on admission  - Thought to be related to hypotension mediated by metoprolol (hx of SVT) and furosemide (hx of heart failure)  - D5 1/2@50 (1-18-1/19 for 12 hours)  - 1/22: sCr 0.96  - Follows with nephrology outpt - last appointment 1/11     # CAN  - Continue home CPAP   - Continue home Albuterol PRN     # H/O Cauda Equine syndrome  - Follows Dr. Delacruz as outpatient     # DISPO:  - Full Code, confirmed on admit  - DC home with resumed O2 pending monitoring post red cell exchange; most likely 1/24   - FUV sickle cell 1/31, Cardio 2/2 & 7/15, Rheum 4/15      I spent >60 minutes in the professional and overall care of this patient.    Assessment and plan discussed with attending physician Dr. Tayo Kerns, APRN-CNP

## 2024-01-23 NOTE — CARE PLAN
The patient's goals for the shift include  pain management.    The clinical goals for the shift include pt will remain safe and free from injury during shift on 01/23/2024 [id number]    Problem: Fall/Injury  Goal: Not fall by end of shift  Outcome: Progressing  Goal: Be free from injury by end of the shift  Outcome: Progressing  Goal: Verbalize understanding of personal risk factors for fall in the hospital  Outcome: Progressing  Goal: Verbalize understanding of risk factor reduction measures to prevent injury from fall in the home  Outcome: Progressing  Goal: Use assistive devices by end of the shift  Outcome: Progressing  Goal: Pace activities to prevent fatigue by end of the shift  Outcome: Progressing     Problem: Pain  Goal: Takes deep breaths with improved pain control throughout the shift  Outcome: Progressing  Goal: Turns in bed with improved pain control throughout the shift  Outcome: Progressing  Goal: Walks with improved pain control throughout the shift  Outcome: Progressing  Goal: Performs ADL's with improved pain control throughout shift  Outcome: Progressing  Goal: Participates in PT with improved pain control throughout the shift  Outcome: Progressing  Goal: Free from opioid side effects throughout the shift  Outcome: Progressing  Goal: Free from acute confusion related to pain meds throughout the shift  Outcome: Progressing     Problem: Pain  Goal: My pain/discomfort is manageable  Outcome: Progressing     Problem: Safety  Goal: Patient will be injury free during hospitalization  Outcome: Progressing  Goal: I will remain free of falls  Outcome: Progressing     Problem: Daily Care  Goal: Daily care needs are met  Outcome: Progressing     Problem: Psychosocial Needs  Goal: Demonstrates ability to cope with hospitalization/illness  Outcome: Progressing  Goal: Collaborate with me, my family, and caregiver to identify my specific goals  Outcome: Progressing     Problem: Discharge Barriers  Goal: My  discharge needs are met  Outcome: Progressing

## 2024-01-24 VITALS
SYSTOLIC BLOOD PRESSURE: 95 MMHG | DIASTOLIC BLOOD PRESSURE: 46 MMHG | HEART RATE: 63 BPM | TEMPERATURE: 97 F | RESPIRATION RATE: 18 BRPM | OXYGEN SATURATION: 100 %

## 2024-01-24 LAB
HEMOGLOBIN A2: 2.6 % (ref 2–3.5)
HEMOGLOBIN A: 74.9 % (ref 95.8–98)
HEMOGLOBIN C: 10.7 %
HEMOGLOBIN F: 0.4 % (ref 0–2)
HEMOGLOBIN IDENTIFICATION INTERPRETATION: ABNORMAL
HEMOGLOBIN S: 11.4 %
PATH REVIEW-HGB IDENTIFICATION: ABNORMAL

## 2024-01-24 PROCEDURE — 99239 HOSP IP/OBS DSCHRG MGMT >30: CPT

## 2024-01-24 PROCEDURE — 2500000001 HC RX 250 WO HCPCS SELF ADMINISTERED DRUGS (ALT 637 FOR MEDICARE OP)

## 2024-01-24 PROCEDURE — 2500000004 HC RX 250 GENERAL PHARMACY W/ HCPCS (ALT 636 FOR OP/ED)

## 2024-01-24 RX ORDER — HYDROMORPHONE HYDROCHLORIDE 1 MG/ML
1 INJECTION, SOLUTION INTRAMUSCULAR; INTRAVENOUS; SUBCUTANEOUS EVERY 4 HOURS PRN
Status: DISCONTINUED | OUTPATIENT
Start: 2024-01-24 | End: 2024-01-24 | Stop reason: HOSPADM

## 2024-01-24 RX ORDER — OXYCODONE HYDROCHLORIDE 5 MG/1
10 TABLET ORAL EVERY 4 HOURS PRN
Status: DISCONTINUED | OUTPATIENT
Start: 2024-01-24 | End: 2024-01-24 | Stop reason: HOSPADM

## 2024-01-24 RX ADMIN — METOPROLOL TARTRATE 12.5 MG: 25 TABLET, FILM COATED ORAL at 08:50

## 2024-01-24 RX ADMIN — FOLIC ACID 1 MG: 1 TABLET ORAL at 08:50

## 2024-01-24 RX ADMIN — OXYCODONE HYDROCHLORIDE 10 MG: 5 TABLET ORAL at 08:45

## 2024-01-24 RX ADMIN — HYDROMORPHONE HYDROCHLORIDE 1 MG: 1 INJECTION, SOLUTION INTRAMUSCULAR; INTRAVENOUS; SUBCUTANEOUS at 10:42

## 2024-01-24 RX ADMIN — HYDROMORPHONE HYDROCHLORIDE 1 MG: 1 INJECTION, SOLUTION INTRAMUSCULAR; INTRAVENOUS; SUBCUTANEOUS at 00:49

## 2024-01-24 RX ADMIN — OXYCODONE HYDROCHLORIDE AND ACETAMINOPHEN 500 MG: 500 TABLET ORAL at 08:50

## 2024-01-24 RX ADMIN — HYDROMORPHONE HYDROCHLORIDE 1 MG: 1 INJECTION, SOLUTION INTRAMUSCULAR; INTRAVENOUS; SUBCUTANEOUS at 14:33

## 2024-01-24 RX ADMIN — HYDROMORPHONE HYDROCHLORIDE 1 MG: 1 INJECTION, SOLUTION INTRAMUSCULAR; INTRAVENOUS; SUBCUTANEOUS at 04:45

## 2024-01-24 RX ADMIN — Medication 1 TABLET: at 08:50

## 2024-01-24 RX ADMIN — OXYCODONE HYDROCHLORIDE 10 MG: 5 TABLET ORAL at 12:48

## 2024-01-24 ASSESSMENT — PAIN - FUNCTIONAL ASSESSMENT
PAIN_FUNCTIONAL_ASSESSMENT: 0-10

## 2024-01-24 ASSESSMENT — PAIN SCALES - GENERAL
PAINLEVEL_OUTOF10: 9
PAINLEVEL_OUTOF10: 8
PAINLEVEL_OUTOF10: 4
PAINLEVEL_OUTOF10: 8
PAINLEVEL_OUTOF10: 8
PAINLEVEL_OUTOF10: 0 - NO PAIN
PAINLEVEL_OUTOF10: 8
PAINLEVEL_OUTOF10: 7
PAINLEVEL_OUTOF10: 9

## 2024-01-24 ASSESSMENT — PAIN DESCRIPTION - ORIENTATION
ORIENTATION: RIGHT;LEFT
ORIENTATION: RIGHT;LEFT

## 2024-01-24 ASSESSMENT — COGNITIVE AND FUNCTIONAL STATUS - GENERAL
DAILY ACTIVITIY SCORE: 24
MOBILITY SCORE: 24

## 2024-01-24 ASSESSMENT — PAIN DESCRIPTION - LOCATION
LOCATION: GENERALIZED
LOCATION: GENERALIZED

## 2024-01-24 NOTE — CARE PLAN
Problem: Fall/Injury  Goal: Not fall by end of shift  Outcome: Progressing  Goal: Be free from injury by end of the shift  Outcome: Progressing  Goal: Verbalize understanding of personal risk factors for fall in the hospital  Outcome: Progressing  Goal: Verbalize understanding of risk factor reduction measures to prevent injury from fall in the home  Outcome: Progressing  Goal: Use assistive devices by end of the shift  Outcome: Progressing  Goal: Pace activities to prevent fatigue by end of the shift  Outcome: Progressing     Problem: Pain  Goal: Takes deep breaths with improved pain control throughout the shift  Outcome: Progressing  Goal: Turns in bed with improved pain control throughout the shift  Outcome: Progressing  Goal: Walks with improved pain control throughout the shift  Outcome: Progressing  Goal: Performs ADL's with improved pain control throughout shift  Outcome: Progressing  Goal: Participates in PT with improved pain control throughout the shift  Outcome: Progressing  Goal: Free from opioid side effects throughout the shift  Outcome: Progressing  Goal: Free from acute confusion related to pain meds throughout the shift  Outcome: Progressing     Problem: Pain  Goal: My pain/discomfort is manageable  Outcome: Progressing     Problem: Safety  Goal: Patient will be injury free during hospitalization  Outcome: Progressing  Goal: I will remain free of falls  Outcome: Progressing     Problem: Daily Care  Goal: Daily care needs are met  Outcome: Progressing     Problem: Psychosocial Needs  Goal: Demonstrates ability to cope with hospitalization/illness  Outcome: Progressing  Goal: Collaborate with me, my family, and caregiver to identify my specific goals  Outcome: Progressing     Problem: Discharge Barriers  Goal: My discharge needs are met  Outcome: Progressing     Problem: Pain - Adult  Goal: Verbalizes/displays adequate comfort level or baseline comfort level  Outcome: Progressing     Problem: Safety  - Adult  Goal: Free from fall injury  Outcome: Progressing     Problem: Discharge Planning  Goal: Discharge to home or other facility with appropriate resources  Outcome: Progressing     Problem: Chronic Conditions and Co-morbidities  Goal: Patient's chronic conditions and co-morbidity symptoms are monitored and maintained or improved  Outcome: Progressing       The clinical goals for the shift include pt will report pain less than 7/10 by end of shift on 1/24/24 @1900    Pt discharged to home and transported home by mother. R femoral line removed prior and discharge instructions reviewed, pt denies questions at this time. Discharge complete.  Marlene Adames RN

## 2024-01-24 NOTE — DISCHARGE SUMMARY
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 Pt has had complications with past outpatient exchanges including SVT following line placement requiring admission and hypoxia/lethargy following exchange (found to have new pHTN). Patient is now admitted for observation following apheresis line placement and RBCEx. Apheresis was placed on 1/19 s/p inpt routine exchange. After her red cell exchange she was hypotensive with SBP <100, dizzy, and fell asleep after the exchange likely from the Benadryl. She woke up and was not sedated with repeated SBP in 120. Follow up echo (1/23) with EF 60-65%. Her home dose of metoprolol was decreased from 25mg BID to 12.5mg BID. Patient was also complaining of right lower calf pain during admission without erythema, rash or swelling; LE Vasc US negative for DVT (1/22). On 1/21, INR 2.7, plan 2.5mg warfarin once per pharmacy and trend INR. On 1/22, warfarin back to home dose of 5mg daily. Pain was managed appropriately following patient's care path, 1/20-1/23 s/p IV Dilaudid to 1.5mg q3 hrs PRN, 1/23 started rotating pain medications with home 10mg oxycodone. On the day of discharge, the patient reported feeling well and pain was controlled. Vitals and labs were stable. Patient was educated that she will be taking 12.5mg metoprolol BID until further discussion with her cardiology outpatient appointment  on 2/2/24. Patient had no further questions and apheresis line was pulled before discharge without complications.     Patient did not request any medication refills on discharge.    FUV sickle cell 1/31, Cardio 2/2, Rheum 4/15, Cardio 7/15    I spent >60 minutes in the professional and overall care of this patient and discharge planning.     Assessment and plan discussed with attending physician Dr. Cr     Pertinent Physical Exam At Time of Discharge  Physical Exam  Vitals reviewed.   Constitutional:       Appearance: Normal appearance.   HENT:      Head: Normocephalic and atraumatic.      Nose: Nose normal.      Mouth/Throat:      Mouth: Mucous membranes are moist.      Pharynx: Oropharynx is clear.   Eyes:      Extraocular Movements: Extraocular movements intact.      Pupils: Pupils are equal, round, and reactive to light.   Cardiovascular:      Rate and Rhythm: Normal rate and regular rhythm.      Pulses: Normal pulses.      Heart sounds: Normal heart sounds.   Pulmonary:      Effort: Pulmonary effort is normal.      Breath sounds: Normal breath sounds.   Abdominal:      General: Bowel sounds are normal.      Palpations: Abdomen is soft.   Musculoskeletal:         General: Normal range of motion.   Skin:     General: Skin is warm.   Neurological:      General: No focal deficit present.      Mental Status: She is alert and oriented to person, place, and time. Mental status is at baseline.   Psychiatric:         Mood and Affect: Mood normal.         Behavior: Behavior normal.       Home Medications     Medication List      CHANGE how you take these medications     metoprolol tartrate 25 mg tablet; Commonly known as: Lopressor; Take 0.5   tablets (12.5 mg) by mouth 2 times a day.; What changed: how much to take     CONTINUE taking these medications     * albuterol 90 mcg/actuation inhaler   * albuterol 90 mcg/actuation inhaler; Inhale 2 puffs every 6 hours if   needed for wheezing.   ascorbic acid 500 mg  chewable tablet; Commonly known as: Vitamin C   DULoxetine 20 mg DR capsule; Commonly known as: Cymbalta; Take 2   capsules (40 mg) by mouth once daily. Do not crush or chew.   elderberry fruit 350 mg capsule   ergocalciferol 1.25 MG (43583 UT) capsule; Commonly known as: Vitamin   D-2; Take 1 capsule (50,000 Units) by mouth 1 (one) time per week.   fluticasone 50 mcg/actuation nasal spray; Commonly known as: Flonase   folic acid 1 mg tablet; Commonly known as: Folvite   furosemide 20 mg tablet; Commonly known as: Lasix   loratadine 10 mg tablet; Commonly known as: Claritin   multivitamin with minerals tablet   naloxone 4 mg/0.1 mL nasal spray; Commonly known as: Narcan; Administer   1 spray (4 mg) into affected nostril(s) if needed for opioid reversal. May   repeat every 2-3 minutes if needed, alternating nostrils, until medical   assistance becomes available.   ondansetron 4 mg tablet; Commonly known as: Zofran; Take 1 tablet (4 mg)   by mouth every 8 hours if needed for nausea or vomiting.   oxyCODONE 10 mg immediate release tablet; Commonly known as: Roxicodone;   Take 1 tablet (10 mg) by mouth every 4 hours if needed for severe pain (7   - 10) (pain).   oxygen gas therapy; Commonly known as: O2; Inhale 1 each continuously.   traZODone 50 mg tablet; Commonly known as: Desyrel; Take 1 tablet (50   mg) by mouth as needed at bedtime for sleep. 1/2 - 1 tabs for insomnia   warfarin 5 mg tablet; Commonly known as: Coumadin; Take as directed. If   you are unsure how to take this medication, talk to your nurse or doctor.  * This list has 2 medication(s) that are the same as other medications   prescribed for you. Read the directions carefully, and ask your doctor or   other care provider to review them with you.       Outpatient Follow-Up  Future Appointments   Date Time Provider Department Center   1/31/2024 10:00 AM GRISEL Albert-CNP [id number] [facility]   2/2/2024 11:20 AM Ankur White DO [address]    4/15/2024 10:30 AM Mirna Vital MD [id number] [facility]   7/15/2024 10:20 AM Ankur White DO [id number] [facility]       Sonia Kerns, GRISEL-CNP

## 2024-01-24 NOTE — CARE PLAN
The patient's goals for the shift include      The clinical goals for the shift include Patient will report pain level less than 8 throughout the shift.      Problem: Fall/Injury  Goal: Not fall by end of shift  Outcome: Progressing  Goal: Be free from injury by end of the shift  Outcome: Progressing  Goal: Verbalize understanding of personal risk factors for fall in the hospital  Outcome: Progressing  Goal: Verbalize understanding of risk factor reduction measures to prevent injury from fall in the home  Outcome: Progressing  Goal: Use assistive devices by end of the shift  Outcome: Progressing  Goal: Pace activities to prevent fatigue by end of the shift  Outcome: Progressing     Problem: Pain  Goal: Takes deep breaths with improved pain control throughout the shift  Outcome: Progressing  Goal: Turns in bed with improved pain control throughout the shift  Outcome: Progressing  Goal: Walks with improved pain control throughout the shift  Outcome: Progressing  Goal: Performs ADL's with improved pain control throughout shift  Outcome: Progressing  Goal: Participates in PT with improved pain control throughout the shift  Outcome: Progressing  Goal: Free from opioid side effects throughout the shift  Outcome: Progressing  Goal: Free from acute confusion related to pain meds throughout the shift  Outcome: Progressing     Problem: Pain  Goal: My pain/discomfort is manageable  Outcome: Progressing     Problem: Safety  Goal: Patient will be injury free during hospitalization  Outcome: Progressing  Goal: I will remain free of falls  Outcome: Progressing     Problem: Daily Care  Goal: Daily care needs are met  Outcome: Progressing     Problem: Psychosocial Needs  Goal: Demonstrates ability to cope with hospitalization/illness  Outcome: Progressing  Goal: Collaborate with me, my family, and caregiver to identify my specific goals  Outcome: Progressing     Problem: Discharge Barriers  Goal: My discharge needs are met  Outcome:  Progressing     Problem: Pain - Adult  Goal: Verbalizes/displays adequate comfort level or baseline comfort level  Outcome: Progressing     Problem: Safety - Adult  Goal: Free from fall injury  Outcome: Progressing     Problem: Discharge Planning  Goal: Discharge to home or other facility with appropriate resources  Outcome: Progressing     Problem: Chronic Conditions and Co-morbidities  Goal: Patient's chronic conditions and co-morbidity symptoms are monitored and maintained or improved  Outcome: Progressing

## 2024-01-25 LAB
ATRIAL RATE: 72 BPM
P AXIS: 56 DEGREES
P OFFSET: 176 MS
P ONSET: 129 MS
PR INTERVAL: 170 MS
Q ONSET: 214 MS
QRS COUNT: 12 BEATS
QRS DURATION: 80 MS
QT INTERVAL: 408 MS
QTC CALCULATION(BAZETT): 446 MS
QTC FREDERICIA: 433 MS
R AXIS: 115 DEGREES
T AXIS: 109 DEGREES
T OFFSET: 418 MS
VENTRICULAR RATE: 72 BPM

## 2024-01-26 ENCOUNTER — TELEPHONE (OUTPATIENT)
Dept: CARDIOLOGY | Facility: CLINIC | Age: 54
End: 2024-01-26
Payer: COMMERCIAL

## 2024-01-26 NOTE — TELEPHONE ENCOUNTER
PST pt discharged on 1/23 with INR 1.8.  She is due for an annual Meter Review.  Appt made for 2/1 in [place] at 1:00 pm.

## 2024-01-30 ENCOUNTER — TELEPHONE (OUTPATIENT)
Dept: ADMISSION | Facility: HOSPITAL | Age: 54
End: 2024-01-30
Payer: COMMERCIAL

## 2024-01-30 DIAGNOSIS — D57.00 SICKLE CELL CRISIS (MULTI): ICD-10-CM

## 2024-01-30 RX ORDER — OXYCODONE HYDROCHLORIDE 10 MG/1
10 TABLET ORAL EVERY 4 HOURS PRN
Qty: 75 TABLET | Refills: 0 | Status: SHIPPED | OUTPATIENT
Start: 2024-01-30 | End: 2024-02-12 | Stop reason: SDUPTHER

## 2024-01-30 NOTE — TELEPHONE ENCOUNTER
Medication Refill-  Oxycodone 10mg    [facility]-  [facility] #[id number]

## 2024-01-31 ENCOUNTER — APPOINTMENT (OUTPATIENT)
Dept: HEMATOLOGY/ONCOLOGY | Facility: HOSPITAL | Age: 54
End: 2024-01-31
Payer: COMMERCIAL

## 2024-02-01 ENCOUNTER — DOCUMENTATION (OUTPATIENT)
Dept: CARDIOLOGY | Facility: CLINIC | Age: 54
End: 2024-02-01
Payer: COMMERCIAL

## 2024-02-01 ENCOUNTER — ANTICOAGULATION - WARFARIN VISIT (OUTPATIENT)
Dept: CARDIOLOGY | Facility: CLINIC | Age: 54
End: 2024-02-01
Payer: COMMERCIAL

## 2024-02-01 ENCOUNTER — APPOINTMENT (OUTPATIENT)
Dept: CARDIOLOGY | Facility: CLINIC | Age: 54
End: 2024-02-01
Payer: COMMERCIAL

## 2024-02-01 DIAGNOSIS — I82.4Y9 DEEP VEIN THROMBOSIS (DVT) OF PROXIMAL LOWER EXTREMITY, UNSPECIFIED CHRONICITY, UNSPECIFIED LATERALITY (MULTI): Primary | ICD-10-CM

## 2024-02-01 DIAGNOSIS — I26.99 RECURRENT PULMONARY EMBOLISM (MULTI): ICD-10-CM

## 2024-02-01 DIAGNOSIS — I82.210 THROMBOSIS OF SUPERIOR VENA CAVA (MULTI): ICD-10-CM

## 2024-02-01 NOTE — PROGRESS NOTES
Patient rescheduled her annual PST meter review for 2/15 earlier today. Called patient and asked that she home test INR tomorrow 2/2. She is agreeable. Marlene Hall RN

## 2024-02-01 NOTE — PROGRESS NOTES
Pt called and cancelled today's appt due to family emergency.  Requested reschedule after 2/13.  POCT scheduled for 2/15 @ 1300.

## 2024-02-02 ENCOUNTER — APPOINTMENT (OUTPATIENT)
Dept: CARDIOLOGY | Facility: CLINIC | Age: 54
End: 2024-02-02
Payer: COMMERCIAL

## 2024-02-05 ENCOUNTER — ANTICOAGULATION - WARFARIN VISIT (OUTPATIENT)
Dept: CARDIOLOGY | Facility: CLINIC | Age: 54
End: 2024-02-05
Payer: COMMERCIAL

## 2024-02-05 DIAGNOSIS — I26.99 RECURRENT PULMONARY EMBOLISM (MULTI): ICD-10-CM

## 2024-02-05 DIAGNOSIS — I82.210 THROMBOSIS OF SUPERIOR VENA CAVA (MULTI): ICD-10-CM

## 2024-02-05 DIAGNOSIS — I82.4Y9 DEEP VEIN THROMBOSIS (DVT) OF PROXIMAL LOWER EXTREMITY, UNSPECIFIED CHRONICITY, UNSPECIFIED LATERALITY (MULTI): Primary | ICD-10-CM

## 2024-02-05 LAB
INR IN PPP BY COAGULATION ASSAY EXTERNAL: 2.9
PROTHROMBIN TIME (PT) IN PPP BY COAGULATION ASSAY EXTERNAL: NORMAL SECONDS

## 2024-02-05 NOTE — PROGRESS NOTES
Patient identification verified with 2 identifiers.    Location: [facility] Patient Self-Testing Program [phone]    Referring Physician: TOMAS Albert   Enrollment/ Re-enrollment date: 2024   INR Goal: 2.0-3.0  INR monitoring is per [facility] protocol.  Anticoagulation Medication: warfarin  Indication: Deep Vein Thrombosis (DVT), PE, SVC thrombosis    Subjective   Bleeding signs/symptoms: No    Bruising: No   Major bleeding event: No  Thrombosis signs/symptoms: No  Thromboembolic event: No  Missed doses: No  Extra doses: No  Medication changes: No  Dietary changes: No  Change in health: No  Change in activity: No  Alcohol: No  Other concerns: No; pt had been admitted for her regularly schedule blood transfusion and warfarin dose maintained.      Upcoming Procedures:  Does the Patient Have any upcoming procedures that require interruption in anticoagulation therapy? no  Does the patient require bridging? no      Anticoagulation Summary  As of 2024      INR goal:  2.0-3.0   TTR:  68.0 % (2.4 mo)   INR used for dosin.90 (2024)   Weekly warfarin total:  35 mg               Assessment/Plan   Therapeutic     1. New dose: no change    2. Next INR: 1 week      Education provided to patient during the visit:  Patient instructed to call in interim with questions, concerns and changes.

## 2024-02-12 ENCOUNTER — TELEPHONE (OUTPATIENT)
Dept: ADMISSION | Facility: HOSPITAL | Age: 54
End: 2024-02-12
Payer: COMMERCIAL

## 2024-02-12 DIAGNOSIS — D57.00 SICKLE CELL CRISIS (MULTI): ICD-10-CM

## 2024-02-12 RX ORDER — OXYCODONE HYDROCHLORIDE 10 MG/1
10 TABLET ORAL EVERY 4 HOURS PRN
Qty: 75 TABLET | Refills: 0 | Status: SHIPPED | OUTPATIENT
Start: 2024-02-12 | End: 2024-02-27 | Stop reason: SDUPTHER

## 2024-02-12 NOTE — TELEPHONE ENCOUNTER
Senait Narvaez called the refill line for oxycodone 10mg. Requesting refills be sent to [facility] pharmacy; message sent to Sickle Cell team to submit.

## 2024-02-13 ENCOUNTER — OFFICE VISIT (OUTPATIENT)
Dept: CARDIOLOGY | Facility: CLINIC | Age: 54
End: 2024-02-13
Payer: COMMERCIAL

## 2024-02-13 VITALS
HEART RATE: 85 BPM | BODY MASS INDEX: 40.32 KG/M2 | HEIGHT: 65 IN | DIASTOLIC BLOOD PRESSURE: 81 MMHG | OXYGEN SATURATION: 94 % | SYSTOLIC BLOOD PRESSURE: 113 MMHG | WEIGHT: 242 LBS

## 2024-02-13 DIAGNOSIS — I21.4 ACUTE NON-ST ELEVATION MYOCARDIAL INFARCTION (NSTEMI) (MULTI): ICD-10-CM

## 2024-02-13 DIAGNOSIS — R93.1 ABNORMAL ECHOCARDIOGRAM: Primary | ICD-10-CM

## 2024-02-13 DIAGNOSIS — R79.89 ELEVATED TROPONIN I LEVEL: ICD-10-CM

## 2024-02-13 LAB
ATRIAL RATE: 85 BPM
P AXIS: 78 DEGREES
P OFFSET: 202 MS
P ONSET: 153 MS
PR INTERVAL: 158 MS
Q ONSET: 232 MS
QRS COUNT: 13 BEATS
QRS DURATION: 72 MS
QT INTERVAL: 352 MS
QTC CALCULATION(BAZETT): 418 MS
QTC FREDERICIA: 395 MS
R AXIS: 75 DEGREES
T AXIS: 89 DEGREES
T OFFSET: 408 MS
VENTRICULAR RATE: 85 BPM

## 2024-02-13 PROCEDURE — 93005 ELECTROCARDIOGRAM TRACING: CPT | Performed by: INTERNAL MEDICINE

## 2024-02-13 PROCEDURE — 3008F BODY MASS INDEX DOCD: CPT | Performed by: INTERNAL MEDICINE

## 2024-02-13 PROCEDURE — 99214 OFFICE O/P EST MOD 30 MIN: CPT | Performed by: INTERNAL MEDICINE

## 2024-02-13 PROCEDURE — 93010 ELECTROCARDIOGRAM REPORT: CPT | Performed by: INTERNAL MEDICINE

## 2024-02-13 PROCEDURE — 1036F TOBACCO NON-USER: CPT | Performed by: INTERNAL MEDICINE

## 2024-02-13 RX ORDER — AMOXICILLIN 250 MG
2 CAPSULE ORAL EVERY 12 HOURS
Status: ON HOLD | COMMUNITY
Start: 2023-12-07

## 2024-02-13 ASSESSMENT — ENCOUNTER SYMPTOMS
DEPRESSION: 0
LOSS OF SENSATION IN FEET: 0
OCCASIONAL FEELINGS OF UNSTEADINESS: 0
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
## 2024-02-15 ENCOUNTER — ANTICOAGULATION - WARFARIN VISIT (OUTPATIENT)
Dept: CARDIOLOGY | Facility: CLINIC | Age: 54
End: 2024-02-15
Payer: COMMERCIAL

## 2024-02-15 DIAGNOSIS — I26.99 RECURRENT PULMONARY EMBOLISM (MULTI): ICD-10-CM

## 2024-02-15 DIAGNOSIS — I82.4Y9 DEEP VEIN THROMBOSIS (DVT) OF PROXIMAL LOWER EXTREMITY, UNSPECIFIED CHRONICITY, UNSPECIFIED LATERALITY (MULTI): Primary | ICD-10-CM

## 2024-02-15 DIAGNOSIS — I82.210 THROMBOSIS OF SUPERIOR VENA CAVA (MULTI): ICD-10-CM

## 2024-02-15 LAB
INR IN PPP BY COAGULATION ASSAY EXTERNAL: 1.9
INR IN PPP BY COAGULATION ASSAY EXTERNAL: 1.9
PROTHROMBIN TIME (PT) IN PPP BY COAGULATION ASSAY EXTERNAL: NORMAL SECONDS
PROTHROMBIN TIME (PT) IN PPP BY COAGULATION ASSAY EXTERNAL: NORMAL SECONDS

## 2024-02-15 NOTE — PROGRESS NOTES
Patient identification verified with 2 identifiers.    Location: [facility]    Referring Physician: TOMAS Albert   Enrollment/ Re-enrollment date: 2024   INR Goal: 2.0-3.0  INR monitoring is per [facility] protocol.  Anticoagulation Medication: warfarin  Indication: Deep Vein Thrombosis (DVT), PE, SVC thrombosis    2/15/24 PATIENT HERE FOR METER REVIEW TODAY, I have watched  her perform self test and obtain sufficient sample of blood on first test.  Pt demonstrated ability to use meter safely and proficiently.  She does not have any issues that she reports with self testing.  Her meter displays the correct time and date and recent INRs in meter match up with reported INRs.      Subjective   Bleeding signs/symptoms: No    Bruising: No   Major bleeding event: No  Thrombosis signs/symptoms: No  Thromboembolic event: No  Missed doses: No  Extra doses: No  Medication changes: No  Dietary changes: Yes  PT HAD A BIRTHDAY PARTY LAST WEEK AND ATE SOME SPINACH DIP.  Change in health: No  Change in activity: No  Alcohol: No  Other concerns: No    Upcoming Procedures:  Does the Patient Have any upcoming procedures that require interruption in anticoagulation therapy? no  Does the patient require bridging? no      Anticoagulation Summary  As of 2/15/2024      INR goal:  2.0-3.0   TTR:  70.7 % (2.8 mo)   INR used for dosin.90 (2/15/2024)   Weekly warfarin total:  35 mg               Assessment/Plan   Subtherapeutic     1. New dose: no change    2. Next INR: 1 week      Education provided to patient during the visit:  Patient instructed to call in interim with questions, concerns and changes.   Patient educated on interactions between medications and warfarin.   Patient educated on compliance with dosing, follow up appointments, and prescribed plan of care.

## 2024-02-20 ENCOUNTER — TELEPHONE (OUTPATIENT)
Dept: HEMATOLOGY/ONCOLOGY | Facility: HOSPITAL | Age: 54
End: 2024-02-20
Payer: COMMERCIAL

## 2024-02-20 DIAGNOSIS — D57.00 SICKLE CELL DISEASE WITH CRISIS (MULTI): Primary | ICD-10-CM

## 2024-02-22 ENCOUNTER — ANTICOAGULATION - WARFARIN VISIT (OUTPATIENT)
Dept: CARDIOLOGY | Facility: CLINIC | Age: 54
End: 2024-02-22
Payer: COMMERCIAL

## 2024-02-22 DIAGNOSIS — I82.4Y9 DEEP VEIN THROMBOSIS (DVT) OF PROXIMAL LOWER EXTREMITY, UNSPECIFIED CHRONICITY, UNSPECIFIED LATERALITY (MULTI): Primary | ICD-10-CM

## 2024-02-22 DIAGNOSIS — I26.99 RECURRENT PULMONARY EMBOLISM (MULTI): ICD-10-CM

## 2024-02-22 DIAGNOSIS — I82.210 THROMBOSIS OF SUPERIOR VENA CAVA (MULTI): ICD-10-CM

## 2024-02-22 NOTE — PROGRESS NOTES
Patient identification verified with 2 identifiers.    Location: [facility] Patient Self-Testing Program [phone]    Referring Physician: TOMAS Albert   Enrollment/ Re-enrollment date: 2024   INR Goal: 2.0-3.0  INR monitoring is per [facility] protocol.  Anticoagulation Medication: warfarin  Indication: Deep Vein Thrombosis (DVT)    Subjective   Bleeding signs/symptoms: No    Bruising: No   Major bleeding event: No  Thrombosis signs/symptoms: No  Thromboembolic event: No  Missed doses: No  Extra doses: No  Medication changes: No  Dietary changes: No  Change in health: No  Change in activity: No  Alcohol: No  Other concerns: No    Upcoming Procedures:  Does the Patient Have any upcoming procedures that require interruption in anticoagulation therapy? no  Does the patient require bridging? no      Anticoagulation Summary  As of 2024      INR goal:  2.0-3.0   TTR:  71.4 % (3 mo)   INR used for dosin.40 (2024)   Weekly warfarin total:  35 mg               Assessment/Plan   Therapeutic     1. New dose: no change    2. Next INR: 2 weeks      Education provided to patient during the visit:  Patient instructed to call in interim with questions, concerns and changes.

## 2024-02-27 ENCOUNTER — OFFICE VISIT (OUTPATIENT)
Dept: HEMATOLOGY/ONCOLOGY | Facility: HOSPITAL | Age: 54
DRG: 812 | End: 2024-02-27
Payer: COMMERCIAL

## 2024-02-27 ENCOUNTER — TELEPHONE (OUTPATIENT)
Dept: HEMATOLOGY/ONCOLOGY | Facility: HOSPITAL | Age: 54
End: 2024-02-27
Payer: COMMERCIAL

## 2024-02-27 ENCOUNTER — HOSPITAL ENCOUNTER (OUTPATIENT)
Dept: RADIOLOGY | Facility: HOSPITAL | Age: 54
Discharge: HOME | DRG: 812 | End: 2024-02-27
Payer: COMMERCIAL

## 2024-02-27 VITALS
RESPIRATION RATE: 18 BRPM | WEIGHT: 239.2 LBS | DIASTOLIC BLOOD PRESSURE: 88 MMHG | OXYGEN SATURATION: 99 % | HEART RATE: 76 BPM | BODY MASS INDEX: 39.8 KG/M2 | TEMPERATURE: 96.8 F | SYSTOLIC BLOOD PRESSURE: 164 MMHG

## 2024-02-27 DIAGNOSIS — D57.00 SICKLE CELL CRISIS (MULTI): ICD-10-CM

## 2024-02-27 DIAGNOSIS — D57.00 SICKLE CELL CRISIS (MULTI): Primary | ICD-10-CM

## 2024-02-27 LAB
ACANTHOCYTES BLD QL SMEAR: NORMAL
ALBUMIN SERPL BCP-MCNC: 3.7 G/DL (ref 3.4–5)
ALP SERPL-CCNC: 106 U/L (ref 33–110)
ALT SERPL W P-5'-P-CCNC: 4 U/L (ref 7–45)
ANION GAP SERPL CALC-SCNC: 11 MMOL/L (ref 10–20)
AST SERPL W P-5'-P-CCNC: 11 U/L (ref 9–39)
BASOPHILS # BLD AUTO: 0.02 X10*3/UL (ref 0–0.1)
BASOPHILS NFR BLD AUTO: 0.2 %
BILIRUB SERPL-MCNC: 1.1 MG/DL (ref 0–1.2)
BUN SERPL-MCNC: 9 MG/DL (ref 6–23)
CALCIUM SERPL-MCNC: 9.2 MG/DL (ref 8.6–10.3)
CHLORIDE SERPL-SCNC: 103 MMOL/L (ref 98–107)
CO2 SERPL-SCNC: 28 MMOL/L (ref 21–32)
CREAT SERPL-MCNC: 0.9 MG/DL (ref 0.5–1.05)
EGFRCR SERPLBLD CKD-EPI 2021: 76 ML/MIN/1.73M*2
EOSINOPHIL # BLD AUTO: 0.12 X10*3/UL (ref 0–0.7)
EOSINOPHIL NFR BLD AUTO: 1.2 %
ERYTHROCYTE [DISTWIDTH] IN BLOOD BY AUTOMATED COUNT: 28.5 % (ref 11.5–14.5)
GIANT PLATELETS BLD QL SMEAR: NORMAL
GLUCOSE SERPL-MCNC: 107 MG/DL (ref 74–99)
HCT VFR BLD AUTO: 31.9 % (ref 36–46)
HGB BLD-MCNC: 10.3 G/DL (ref 12–16)
HGB RETIC QN: 23 PG (ref 28–38)
HOWELL-JOLLY BOD BLD QL SMEAR: PRESENT
HYPOCHROMIA BLD QL SMEAR: NORMAL
IMM GRANULOCYTES # BLD AUTO: 0.02 X10*3/UL (ref 0–0.7)
IMM GRANULOCYTES NFR BLD AUTO: 0.2 % (ref 0–0.9)
IMMATURE RETIC FRACTION: 58.3 %
LDH SERPL L TO P-CCNC: 161 U/L (ref 84–246)
LYMPHOCYTES # BLD AUTO: 1.61 X10*3/UL (ref 1.2–4.8)
LYMPHOCYTES NFR BLD AUTO: 16.7 %
MCH RBC QN AUTO: 22.8 PG (ref 26–34)
MCHC RBC AUTO-ENTMCNC: 32.3 G/DL (ref 32–36)
MCV RBC AUTO: 71 FL (ref 80–100)
MONOCYTES # BLD AUTO: 0.53 X10*3/UL (ref 0.1–1)
MONOCYTES NFR BLD AUTO: 5.5 %
NEUTROPHILS # BLD AUTO: 7.35 X10*3/UL (ref 1.2–7.7)
NEUTROPHILS NFR BLD AUTO: 76.2 %
NRBC BLD-RTO: 0.2 /100 WBCS (ref 0–0)
PAPPENHEIMER BOD BLD QL SMEAR: PRESENT
PLATELET # BLD AUTO: 370 X10*3/UL (ref 150–450)
POLYCHROMASIA BLD QL SMEAR: NORMAL
POTASSIUM SERPL-SCNC: 3.9 MMOL/L (ref 3.5–5.3)
PROT SERPL-MCNC: 7.3 G/DL (ref 6.4–8.2)
RBC # BLD AUTO: 4.51 X10*6/UL (ref 4–5.2)
RBC MORPH BLD: NORMAL
RETICS #: 0.04 X10*6/UL (ref 0.02–0.08)
RETICS/RBC NFR AUTO: 1 % (ref 0.5–2)
SCHISTOCYTES BLD QL SMEAR: NORMAL
SODIUM SERPL-SCNC: 138 MMOL/L (ref 136–145)
TARGETS BLD QL SMEAR: NORMAL
WBC # BLD AUTO: 9.7 X10*3/UL (ref 4.4–11.3)

## 2024-02-27 PROCEDURE — 85045 AUTOMATED RETICULOCYTE COUNT: CPT

## 2024-02-27 PROCEDURE — 85025 COMPLETE CBC W/AUTO DIFF WBC: CPT

## 2024-02-27 PROCEDURE — G2212 PROLONG OUTPT/OFFICE VIS: HCPCS | Performed by: NURSE PRACTITIONER

## 2024-02-27 PROCEDURE — 71046 X-RAY EXAM CHEST 2 VIEWS: CPT | Performed by: RADIOLOGY

## 2024-02-27 PROCEDURE — 2500000001 HC RX 250 WO HCPCS SELF ADMINISTERED DRUGS (ALT 637 FOR MEDICARE OP): Performed by: NURSE PRACTITIONER

## 2024-02-27 PROCEDURE — 99215 OFFICE O/P EST HI 40 MIN: CPT | Performed by: NURSE PRACTITIONER

## 2024-02-27 PROCEDURE — 96372 THER/PROPH/DIAG INJ SC/IM: CPT

## 2024-02-27 PROCEDURE — 83615 LACTATE (LD) (LDH) ENZYME: CPT

## 2024-02-27 PROCEDURE — 99417 PROLNG OP E/M EACH 15 MIN: CPT | Mod: ZK | Performed by: NURSE PRACTITIONER

## 2024-02-27 PROCEDURE — 2500000005 HC RX 250 GENERAL PHARMACY W/O HCPCS: Performed by: NURSE PRACTITIONER

## 2024-02-27 PROCEDURE — 80053 COMPREHEN METABOLIC PANEL: CPT

## 2024-02-27 PROCEDURE — 87636 SARSCOV2 & INF A&B AMP PRB: CPT

## 2024-02-27 PROCEDURE — 99215 OFFICE O/P EST HI 40 MIN: CPT | Mod: ZK | Performed by: NURSE PRACTITIONER

## 2024-02-27 PROCEDURE — 71046 X-RAY EXAM CHEST 2 VIEWS: CPT

## 2024-02-27 PROCEDURE — 96372 THER/PROPH/DIAG INJ SC/IM: CPT | Performed by: NURSE PRACTITIONER

## 2024-02-27 PROCEDURE — 2500000004 HC RX 250 GENERAL PHARMACY W/ HCPCS (ALT 636 FOR OP/ED): Performed by: NURSE PRACTITIONER

## 2024-02-27 PROCEDURE — 36415 COLL VENOUS BLD VENIPUNCTURE: CPT

## 2024-02-27 RX ORDER — ONDANSETRON HYDROCHLORIDE 8 MG/1
8 TABLET, FILM COATED ORAL ONCE
Status: COMPLETED | OUTPATIENT
Start: 2024-02-27 | End: 2024-02-27

## 2024-02-27 RX ORDER — NALOXONE HYDROCHLORIDE 0.4 MG/ML
0.4 INJECTION, SOLUTION INTRAMUSCULAR; INTRAVENOUS; SUBCUTANEOUS
Status: DISCONTINUED | OUTPATIENT
Start: 2024-02-27 | End: 2024-02-27 | Stop reason: HOSPADM

## 2024-02-27 RX ORDER — DIPHENHYDRAMINE HCL 25 MG
25 CAPSULE ORAL ONCE
Status: COMPLETED | OUTPATIENT
Start: 2024-02-27 | End: 2024-02-27

## 2024-02-27 RX ORDER — OXYCODONE HYDROCHLORIDE 10 MG/1
10 TABLET ORAL EVERY 4 HOURS PRN
Qty: 75 TABLET | Refills: 0 | Status: SHIPPED | OUTPATIENT
Start: 2024-02-27 | End: 2024-03-12 | Stop reason: SDUPTHER

## 2024-02-27 RX ADMIN — HYDROMORPHONE HYDROCHLORIDE 0.5 MG: 2 INJECTION, SOLUTION INTRAMUSCULAR; INTRAVENOUS; SUBCUTANEOUS at 16:37

## 2024-02-27 RX ADMIN — HYDROMORPHONE HYDROCHLORIDE 0.5 MG: 2 INJECTION, SOLUTION INTRAMUSCULAR; INTRAVENOUS; SUBCUTANEOUS at 13:19

## 2024-02-27 RX ADMIN — ONDANSETRON HYDROCHLORIDE 8 MG: 8 TABLET, FILM COATED ORAL at 13:19

## 2024-02-27 RX ADMIN — HYDROMORPHONE HYDROCHLORIDE 0.5 MG: 2 INJECTION, SOLUTION INTRAMUSCULAR; INTRAVENOUS; SUBCUTANEOUS at 15:24

## 2024-02-27 RX ADMIN — HYDROMORPHONE HYDROCHLORIDE 0.5 MG: 2 INJECTION, SOLUTION INTRAMUSCULAR; INTRAVENOUS; SUBCUTANEOUS at 14:25

## 2024-02-27 RX ADMIN — DIPHENHYDRAMINE HYDROCHLORIDE 25 MG: 25 CAPSULE ORAL at 13:19

## 2024-02-27 ASSESSMENT — PAIN SCALES - GENERAL
PAINLEVEL_OUTOF10: 7
PAINLEVEL: 9
PAINLEVEL_OUTOF10: 9
PAINLEVEL_OUTOF10: 8

## 2024-02-27 ASSESSMENT — PAIN DESCRIPTION - LOCATION: LOCATION: GENERALIZED

## 2024-02-27 ASSESSMENT — PAIN - FUNCTIONAL ASSESSMENT
PAIN_FUNCTIONAL_ASSESSMENT: 0-10
PAIN_FUNCTIONAL_ASSESSMENT: PAINAD (PAIN ASSESSMENT IN ADVANCED DEMENTIA SCALE)

## 2024-02-27 NOTE — PROGRESS NOTES
Pt refused ED, stated they don't know how to take care of sickle cell patients down there. She would rather wait at home for a phone call to be able to come back for a bed. Pt informed that she can call back at 830 am tomorrow morning to be seen in [facility] again. When pt left she was having 6/10 pain.

## 2024-02-27 NOTE — TELEPHONE ENCOUNTER
Pt requesting refill   Oxycodone 10mg. 1 tablet every 4 hrs prn  Pharmacy verified.   Last FUV 1/16, next FUV 2/28

## 2024-02-27 NOTE — PROGRESS NOTES
Patient ID:  Senait Narvaez is a 54 y.o. female.  Subjective   Chief Complaint: Uncontrolled Pain    HPI  Senait is a 54 y.o. 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 Her pain is all over and has been going on since her birthday party mid February but now feels even worse. The pain is consistent with her sickle cell pain. She has tried managing with her home oxycodone. She also reports productive cough (clear sputum) for the past week or so, some nausea and has 2 episodes of emesis the last 2 nights, an episode of diarrhea this am,HA and some nasal congestion. She does have some SOB that is with activity but she feels this is new. Pt denies chest pain, palpitations, blurry vision, falls, fever or chills, abd pain, or urinary complaints.         ROS  Review of Systems - Oncology     Allergies  No Known Allergies     Medications  Current Outpatient Medications   Medication Instructions    albuterol 90 mcg/actuation inhaler 2 puffs, inhalation, Every 6 hours PRN    ascorbic acid (VITAMIN C) 500 mg, oral, Daily    DULoxetine (CYMBALTA) 40 mg, oral, Daily, Do not crush or chew.    elderberry fruit 350 mg capsule 2 capsules, oral, Daily    ergocalciferol (VITAMIN D-2) 50,000 Units, oral, Weekly    fluticasone (Flonase) 50 mcg/actuation nasal spray 2 sprays, Each Nostril, As needed    folic acid (FOLVITE) 1 mg, oral, Daily    furosemide (LASIX) 20 mg, oral, Every other day    loratadine (Claritin) 10 mg tablet 1 tablet, oral, Daily PRN    metoprolol tartrate (LOPRESSOR) 12.5 mg, oral, 2 times daily    multivitamin with minerals tablet 1 tablet, oral, Daily RT    naloxone (NARCAN) 4 mg, nasal, As needed, May repeat every 2-3 minutes if needed, alternating  nostrils, until medical assistance becomes available.    ondansetron (ZOFRAN) 4 mg, oral, Every 8 hours PRN    oxyCODONE (ROXICODONE) 10 mg, oral, Every 4 hours PRN    oxygen (O2) gas therapy 1 each, inhalation, Continuous    sennosides-docusate sodium (Nita-Colace) 8.6-50 mg tablet 2 tablets, oral, Every 12 hours    traZODone (DESYREL) 50 mg, oral, Nightly PRN, 1/2 - 1 tabs for insomnia    warfarin (COUMADIN) 5 mg, oral, Every evening        Past Medical History:   Past Medical History:  No date: Asthma  No date: CHF (congestive heart failure) (CMS/[facility])  No date: Chronic pain disorder  02/19/2020: Compression of vein      Comment:  Superior vena cava syndrome  12/10/2020: Hypotension, unspecified   Surgical History:    Past Surgical History:   Procedure Laterality Date    APPENDECTOMY  08/05/2013    Appendectomy    CHOLECYSTECTOMY  08/05/2013    Cholecystectomy    CT ANGIO NECK  10/19/2022    CT NECK ANGIO W AND WO IV CONTRAST 10/19/2022 [facility] CLINICAL LEGACY    CT GUIDED PERCUTANEOUS BIOPSY LYMPH NODE SUPERFICIAL  9/19/2019    CT GUIDED PERCUTANEOUS BIOPSY LYMPH NODE SUPERFICIAL 9/19/2019 [facility] [place] [place]    IR CVC NONTUNNELED  10/26/2023    IR CVC NONTUNNELED 10/26/2023 [facility] ANGIO    IR CVC NONTUNNELED  12/7/2023    IR CVC NONTUNNELED 12/7/2023 Marcos Moser MD [facility] ANGIO    IR CVC TUNNELED  3/13/2015    IR CVC TUNNELED 3/13/2015 [facility] CLINICAL LEGACY    IR CVC TUNNELED  1/29/2016    IR CVC TUNNELED 1/29/2016 [facility] AIB LEGACY    IR CVC TUNNELED  1/17/2018    IR CVC TUNNELED 1/17/2018 [facility] AIB LEGACY    IR CVC TUNNELED  2/22/2018    IR CVC TUNNELED 2/22/2018 [facility] AIB LEGACY    IR CVC TUNNELED  3/22/2018    IR CVC TUNNELED 3/22/2018 [facility] CLINICAL LEGACY    IR CVC TUNNELED  4/18/2018    IR CVC TUNNELED 4/18/2018 [facility]IB LEGACY    IR CVC TUNNELED  5/16/2018    IR CVC TUNNELED 5/16/2018 [facility] AIB LEGACY    IR CVC TUNNELED  6/12/2018    IR CVC TUNNELED 6/12/2018 [facility] AIB LEGACY    IR CVC TUNNELED  1/21/2020    IR CVC  TUNNELED 1/21/2020 [facility] INPATIENT LEGACY    IR CVC TUNNELED  2/28/2020    IR CVC TUNNELED 2/28/2020 [facility] [place] [place]    IR CVC TUNNELED  4/10/2020    IR CVC TUNNELED 4/10/2020 [facility]    IR CVC TUNNELED  5/22/2020    IR CVC TUNNELED 5/22/2020 [facility]    IR CVC TUNNELED  6/19/2020    IR CVC TUNNELED 6/19/2020 [facility]    IR CVC TUNNELED  7/23/2020    IR CVC TUNNELED 7/23/2020 [facility]    IR CVC TUNNELED  9/4/2020    IR CVC TUNNELED 9/4/2020 [facility]    IR CVC TUNNELED  10/9/2020    IR CVC TUNNELED 10/9/2020 [facility] LEGAC[facility]    IR CVC TUNNELED  11/13/2020    IR CVC TUNNELED 11/13/2020 [facility]    IR CVC TUNNELED  12/18/2020    IR CVC TUNNELED 12/18/2020 [facility]    IR CVC TUNNELED  1/22/2021    IR CVC TUNNELED 1/22/2021 [facility]    IR CVC TUNNELED  2/26/2021    IR CVC TUNNELED 2/26/2021 [facility] CLINICAL LEGACY    IR CVC TUNNELED  4/2/2021    IR CVC TUNNELED 4/2/2021 [facility]    IR CVC TUNNELED  5/7/2021    IR CVC TUNNELED 5/7/2021 [facility]CILL[facility] LEGACY    IR CVC TUNNELED  6/11/2021    IR CVC TUNNELED 6/11/2021 [facility]    IR CVC TUNNELED  7/16/2021    IR CVC TUNNELED 7/16/2021 [facility]CILL[facility] LEGACY    IR CVC TUNNELED  8/20/2021    IR CVC TUNNELED 8/20/2021 [facility]    IR CVC TUNNELED  9/24/2021    IR CVC TUNNELED 9/24/2021 [facility]    IR CVC TUNNELED  10/29/2021    IR CVC TUNNELED 10/29/2021 [facility]GAC[facility]    IR CVC TUNNELED  12/3/2021    IR CVC TUNNELED 12/3/2021 [facility]    IR CVC TUNNELED  1/7/2022    IR CVC TUNNELED 1/7/2022 [facility] ANCILLARY LEGACY    IR CVC TUNNELED  2/23/2022    IR CVC TUNNELED 2/23/2022 [facility] CLINICAL LEGACY    IR CVC TUNNELED  3/25/2022    IR CVC TUNNELED 3/25/2022 [facility] ANCILLARY LEGACY    IR CVC TUNNELED  4/22/2022    IR CVC TUNNELED 4/22/2022 [facility] ANCILLARY LEGACY    IR CVC TUNNELED  6/3/2022    IR CVC TUNNELED 6/3/2022 [facility] ANCILLARY LEGACY    IR CVC TUNNELED  9/18/2017    IR CVC TUNNELED 9/18/2017 [facility] AIB  LEGACY    IR CVC TUNNELED  10/30/2017    IR CVC TUNNELED 10/30/2017 [facility] AIB LEGACY    IR CVC TUNNELED  12/19/2017    IR CVC TUNNELED 12/19/2017 [facility] AIB LEGACY    IR CVC TUNNELED  1/6/2023    IR CVC TUNNELED 1/6/2023 [facility] LEGACY    IR CVC TUNNELED  2/24/2023    IR CVC TUNNELED [facility] ANGIO    IR CVC TUNNELED  7/8/2022    IR CVC TUNNELED 7/8/2022 [facility] ANCILLARY LEGACY    IR CVC TUNNELED  8/12/2022    IR CVC TUNNELED 8/12/2022 [facility] CLINICAL LEGACY    IR CVC TUNNELED  9/16/2022    IR CVC TUNNELED 9/16/2022 [facility] ANCILLARY LEGACY    IR CVC TUNNELED  10/20/2022    IR CVC TUNNELED 10/20/2022 [facility] CLINICAL LEGACY    IR CVC TUNNELED  11/29/2022    IR CVC TUNNELED 11/29/2022 [facility] ANCILLARY LEGACY    IR CVC TUNNELED  3/31/2023    IR CVC TUNNELED [facility] ANGIO    IR CVC TUNNELED  6/9/2023    IR CVC TUNNELED 6/9/2023 [facility] ANGIO    IR CVC TUNNELED  7/20/2023    IR CVC TUNNELED 7/20/2023 [facility] ANGIO    IR CVC TUNNELED  8/16/2023    IR CVC TUNNELED 8/16/2023 [facility] ANGIO    IR CVC TUNNELED  9/21/2023    IR CVC TUNNELED 9/21/2023 [facility] ANGIO    MR HEAD ANGIO WO IV CONTRAST  8/16/2014    MR HEAD ANGIO WO IV CONTRAST 8/16/2014 [facility] ANCILLARY LEGACY    MR NECK ANGIO WO IV CONTRAST  8/16/2014    MR NECK ANGIO WO IV CONTRAST 8/16/2014 [facility] ANCILLARY LEGACY    OTHER SURGICAL HISTORY  05/13/2014    Fluid Drained, Retina Inspected: Breaks Supported By Buckle    OTHER SURGICAL HISTORY  10/09/2014    Closed Treatment Of Fracture Of The Lateral Malleolus    OTHER SURGICAL HISTORY  06/09/2014    Salpingo-oophorectomy Right Side    US GUIDED BIOPSY LYMPH NODE SUPERFICIAL  9/17/2019    [place] GUIDED BIOPSY LYMPH NODE SUPERFICIAL [place]/2019 [facility] [place]      Family History:    Family History   Problem Relation Name Age of Onset    Diabetes Father      Gout Father      Sickle cell trait Father      Seizures Sister      Diabetes Other      Uterine cancer Other      Other (congenital blindness) Cousin       Family Oncology History:    Cancer-related family  history includes Uterine cancer in an other family member.  Social History:    Social History     Tobacco Use    Smoking status: Former     Types: Cigarettes     Quit date: 2014     Years since quitting: 10.1     Passive exposure: Past    Smokeless tobacco: Never   Substance Use Topics    Alcohol use: Not Currently    Drug use: Not Currently        Objective   Vitals: /88 (BP Location: Right arm, Patient Position: Sitting)   Pulse 76   Temp 36 °C (96.8 °F) (Temporal)   Resp 18   Wt 109 kg (239 lb 3.2 oz)   SpO2 99%   BMI 39.80 kg/m²   Weight:   Vitals:    02/27/24 1302   Weight: 109 kg (239 lb 3.2 oz)       Physical Exam  Vitals reviewed.   Constitutional:       Appearance: Normal appearance.   HENT:      Head: Normocephalic and atraumatic.      Nose: Congestion present.      Mouth/Throat:      Mouth: Mucous membranes are moist.      Pharynx: Oropharynx is clear. No posterior oropharyngeal erythema.   Eyes:      Conjunctiva/sclera: Conjunctivae normal.      Pupils: Pupils are equal, round, and reactive to light.   Cardiovascular:      Rate and Rhythm: Normal rate and regular rhythm.      Pulses: Normal pulses.      Heart sounds: Normal heart sounds.   Pulmonary:      Effort: Pulmonary effort is normal.      Breath sounds: Normal breath sounds.   Abdominal:      General: Abdomen is flat. Bowel sounds are normal. There is no distension.      Palpations: Abdomen is soft.      Tenderness: There is no abdominal tenderness.   Musculoskeletal:         General: No swelling.      Cervical back: Normal range of motion and neck supple.      Right lower leg: No edema.      Left lower leg: No edema.   Skin:     General: Skin is warm and dry.   Neurological:      General: No focal deficit present.      Mental Status: She is alert and oriented to person, place, and time.       Diagnostic Results     Labs  Results from last 7 days   Lab Units 02/27/24  1313   WBC AUTO x10*3/uL 9.7   HEMOGLOBIN g/dL 10.3*   HEMATOCRIT %  31.9*   PLATELETS AUTO x10*3/uL 370   NEUTROS ABS x10*3/uL 7.35   LYMPHS ABS AUTO x10*3/uL 1.61   MONOS ABS AUTO x10*3/uL 0.53   EOS ABS AUTO x10*3/uL 0.12   NEUTROS PCT AUTO % 76.2   LYMPHS PCT AUTO % 16.7   MONOS PCT AUTO % 5.5   EOS PCT AUTO % 1.2      Results from last 7 days   Lab Units 02/27/24  1313   GLUCOSE mg/dL 107*   SODIUM mmol/L 138   POTASSIUM mmol/L 3.9   CHLORIDE mmol/L 103   CO2 mmol/L 28   BUN mg/dL 9   CREATININE mg/dL 0.90   EGFR mL/min/1.73m*2 76   CALCIUM mg/dL 9.2   ALBUMIN g/dL 3.7   PROTEIN TOTAL g/dL 7.3     Results from last 7 days   Lab Units 02/27/24  1313   BILIRUBIN TOTAL mg/dL 1.1   ALK PHOS U/L 106   ALT U/L 4*   AST U/L 11                     Lab Results   Component Value Date    HGB 8.7 (L) 01/23/2024    HGB 7.9 (L) 01/22/2024    HGB 7.8 (L) 01/21/2024     01/23/2024     01/22/2024     01/21/2024     01/22/2024     01/21/2024     01/20/2024       Images  === 01/16/24 ===    XR KNEE 3 VIEWS BILATERAL    - Impression -  Degenerative changes of the bilateral knees as above.  Remote avulsion of the right medial collateral ligament from the  medial femoral condyle.    I personally reviewed the images/study and I agree with the findings  as stated by Dr. Darrel Rico. This study was interpreted at  [place], [place].    MACRO:  None    Signed by: Venkat Giordano 1/18/2024 1:16 PM  Dictation workstation:   [id number]   === 10/24/22 ===    CT CHEST PULMONARY EMBOLISM W IV CONTRAST    - Impression -  1. No evidence of acute pulmonary embolism. Prominent azygous/joi  azygous system may reflect azygous/SVC junction stricture/narrowing.  2. Nonocclusive eccentric filling defects in the bilateral lower lobe  subsegmental pulmonary branches, likely chronic pulmonary emboli.  Mosaic perfusion pattern most likely represents changes of prior  chronic pulmonary embolism. Correlate with a component of  small  vessel disease. Correlate with pulmonary hypertension.  3. Stable multifocal bandlike atelectasis versus scarring.  4. Stable 3-4 mm pulmonary nodules, likely benign. No new or  suspicious pulmonary nodules are seen.    I personally reviewed the images/study and I agree with the findings  as stated. This study was interpreted at [place], [place].     Transthoracic Echo (TTE) Complete    Result Date: 1/23/2024   [facility], [address] [place] [address]                Tel [phone] and Fax [phone] TRANSTHORACIC ECHOCARDIOGRAM REPORT  Patient Name:     KIRSTY [facility]         Letty Physician:  [id number] Ag Houston MD Study Date:       1/23/2024           Ordering Provider:  [id number] PENG REARDON MRN/PID:          [id number]            Fellow: Accession#:       [id number]        Nurse: Date of           1970 / 53      Sonographer:        Sona Jiménez KERMIT Birth/Age:        years Gender:           F                   Additional Staff: Height:           165.10 cm           Admit Date:         1/8/2024 Weight:           107.50 kg           Admission Status:   Inpatient - Routine BSA:              2.13 m2             Encounter#:         [id number]                                       [place] [address]                                       Location: Blood Pressure: 110 /46 mmHg Study Type:    TRANSTHORACIC ECHO (TTE) COMPLETE Diagnosis/ICD: Sickle cell disease without crisis-D57.1 Indication:    Sickle cell disease without crisis; Thrombosis of superior vena                cava CPT Code:      Echo Complete w Full [id number] Patient History: Pertinent History: Previous DVT, Cardiomyopathy, Chest Pain and PE. Sickle cell                    disease; Pulm HTN; CHARLES. Study Detail: The following Echo studies were performed: 2D, M-Mode, Doppler and                color flow. Technically challenging study due to body habitus and               poor acoustic windows. Definity used as a contrast agent for               endocardial border definition. Total contrast used for this               procedure was 4 mL via IV push.  PHYSICIAN INTERPRETATION: Left Ventricle: The left ventricular systolic function is normal, with an estimated ejection fraction of 60-65%. There are no regional wall motion abnormalities. The left ventricular cavity size is normal. Spectral Doppler shows a normal pattern of left ventricular diastolic filling. Left Atrium: The left atrium is upper limits of normal in size. Right Ventricle: The right ventricle is moderately enlarged. There is reduced right ventricular systolic function. Right Atrium: The right atrium was not well visualized. Aortic Valve: The aortic valve is probably trileaflet. There is no evidence of aortic valve regurgitation. The peak instantaneous gradient of the aortic valve is 12.1 mmHg. The mean gradient of the aortic valve is 4.2 mmHg. Mitral Valve: The mitral valve is normal in structure. There is no evidence of mitral valve regurgitation. Tricuspid Valve: The tricuspid valve was not well visualized. There is trace tricuspid regurgitation. The right ventricular systolic pressure is unable to be estimated. Pulmonic Valve: The pulmonic valve is not well visualized. There is physiologic pulmonic valve regurgitation. Pericardium: There is no pericardial effusion noted. Aorta: The aortic root was not well visualized. Systemic Veins: The inferior vena cava appears dilated. There is less than 50% IVC collapse with inspiration. In comparison to the previous echocardiogram(s): Compared with study from 6/29/2023, the imaging windows are more challenging on the current examination but the right ventricle appears larger and less dynamic.  CONCLUSIONS:  1. Poorly visualized anatomical structures due to suboptimal image quality.  2. Left  ventricular systolic function is normal with a 60-65% estimated ejection fraction.  3. There is reduced right ventricular systolic function.  4. Moderately enlarged right ventricle. QUANTITATIVE DATA SUMMARY: 2D MEASUREMENTS:                          Normal Ranges: IVSd:          0.71 cm   (0.6-1.1cm) LVPWd:         0.67 cm   (0.6-1.1cm) LVIDd:         4.70 cm   (3.9-5.9cm) LVIDs:         3.08 cm LV Mass Index: 47.5 g/m2 LV % FS        34.6 % LA VOLUME:                               Normal Ranges: LA Vol A4C:        76.9 ml    (22+/-6mL/m2) LA Vol A2C:        65.4 ml LA Vol BP:         73.4 ml LA Vol Index A4C:  36.1ml/m2 LA Vol Index A2C:  30.7 ml/m2 LA Vol Index BP:   34.5 ml/m2 LA Area A4C:       22.3 cm2 LA Area A2C:       21.3 cm2 LA Major Axis A4C: 5.5 cm LA Major Axis A2C: 5.9 cm RA VOLUME BY A/L METHOD:                               Normal Ranges: RA Vol A4C:        51.4 ml    (8.3-19.5ml) RA Vol Index A4C:  24.2 ml/m2 RA Area A4C:       17.9 cm2 RA Major Axis A4C: 5.3 cm M-MODE MEASUREMENTS:                  Normal Ranges: Ao Root: 2.90 cm (2.0-3.7cm) LAs:     3.38 cm (2.7-4.0cm) LV DIASTOLIC FUNCTION:                               Normal Ranges: MV Peak E:        0.78 m/s    (0.7-1.2 m/s) MV Peak A:        0.65 m/s    (0.42-0.7 m/s) E/A Ratio:        1.19        (1.0-2.2) MV e'             0.12 m/s    (>8.0) MV lateral e'     0.13 m/s MV medial e'      0.11 m/s MV A Dur:         156.04 msec E/e' Ratio:       6.51        (<8.0) PulmV Sys Jeremy:    34.04 cm/s PulmV Jose Jeremy:   30.46 cm/s PulmV S/D Jeremy:    1.12 PulmV A Revs Jeremy: 25.89 cm/s PulmV A Revs Dur: 117.98 msec MITRAL VALVE:                 Normal Ranges: MV DT: 304 msec (150-240msec) AORTIC VALVE:                                    Normal Ranges: AoV Vmax:                1.74 m/s  (<=1.7m/s) AoV Peak P.1 mmHg (<20mmHg) AoV Mean P.2 mmHg  (1.7-11.5mmHg) LVOT Max Jeremy:            1.46 m/s  (<=1.1m/s) AoV VTI:                  29.52 cm  (18-25cm) LVOT VTI:                30.61 cm LVOT Diameter:           2.08 cm   (1.8-2.4cm) AoV Area, VTI:           3.53 cm2  (2.5-5.5cm2) AoV Area,Vmax:           2.86 cm2  (2.5-4.5cm2) AoV Dimensionless Index: 1.04  RIGHT VENTRICLE: TAPSE: 27.0 mm RV s'  0.15 m/s TRICUSPID VALVE/RVSP:                   Normal Ranges: IVC Diam: 2.10 cm PULMONIC VALVE:                         Normal Ranges: PV Accel Time: 148 msec (>120ms) PV Max Jeremy:    0.7 m/s  (0.6-0.9m/s) PV Max P.8 mmHg Pulmonary Veins: PulmV A Revs Dur: 117.98 msec PulmV A Revs Jeremy: 25.89 cm/s PulmV Jose Jeremy:   30.46 cm/s PulmV S/D Jeremy:    1.12 PulmV Sys Jeremy:    34.04 cm/s  [id number] Ag Houston MD Electronically signed on 2024 at 7:41:34 PM  ** Final **         Assessment/Plan   Senait Narvaez is a 54 y.o. 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          [facility] Course  - VSS  -  Hemolysis labs near baseline, no indication of acute vaso-occlusive crisis or indication for blood transfusion   - dilaudid 0.5 mg Q 1 hour x 4 doses per care plan given for sickle cell related pain  - Zofran 8mg once for opioid induced nausea/vomiting  - Benadryl 25mg once for opioid induced pruritus   - Covid/flu swab pending (reported N&V, cough, some SOB with activity SpO2 99%on RA)  - CXR pending     Disposition  - direct admit for pain crisis accepted by Dr. Cr, per transfer center likely no bed avail and patient will have to go to ED for further management. Sickle cell team updated on plan  for admission.             Marlene Harmon, GRISEL-CNP

## 2024-02-28 ENCOUNTER — APPOINTMENT (OUTPATIENT)
Dept: HEMATOLOGY/ONCOLOGY | Facility: HOSPITAL | Age: 54
DRG: 812 | End: 2024-02-28
Payer: COMMERCIAL

## 2024-02-28 ENCOUNTER — TELEPHONE (OUTPATIENT)
Dept: HEMATOLOGY/ONCOLOGY | Facility: HOSPITAL | Age: 54
End: 2024-02-28

## 2024-02-28 ENCOUNTER — HOSPITAL ENCOUNTER (INPATIENT)
Facility: HOSPITAL | Age: 54
LOS: 4 days | Discharge: HOME | DRG: 812 | End: 2024-03-03
Attending: INTERNAL MEDICINE
Payer: COMMERCIAL

## 2024-02-28 VITALS
TEMPERATURE: 99 F | SYSTOLIC BLOOD PRESSURE: 123 MMHG | WEIGHT: 240.52 LBS | BODY MASS INDEX: 40.02 KG/M2 | DIASTOLIC BLOOD PRESSURE: 59 MMHG | RESPIRATION RATE: 18 BRPM | OXYGEN SATURATION: 100 % | HEART RATE: 87 BPM

## 2024-02-28 DIAGNOSIS — D57.00 SICKLE CELL CRISIS (MULTI): Primary | ICD-10-CM

## 2024-02-28 DIAGNOSIS — I82.210 THROMBOSIS OF SUPERIOR VENA CAVA (MULTI): ICD-10-CM

## 2024-02-28 DIAGNOSIS — G47.33 OSA ON CPAP: ICD-10-CM

## 2024-02-28 DIAGNOSIS — J02.9 SORE THROAT: ICD-10-CM

## 2024-02-28 DIAGNOSIS — D57.1 SICKLE CELL ANEMIA WITHOUT CRISIS (MULTI): Primary | ICD-10-CM

## 2024-02-28 LAB
ABO GROUP (TYPE) IN BLOOD: NORMAL
ALBUMIN SERPL BCP-MCNC: 3.7 G/DL (ref 3.4–5)
ALBUMIN SERPL BCP-MCNC: 3.9 G/DL (ref 3.4–5)
ALP SERPL-CCNC: 104 U/L (ref 33–110)
ALP SERPL-CCNC: 104 U/L (ref 33–110)
ALT SERPL W P-5'-P-CCNC: 5 U/L (ref 7–45)
ALT SERPL W P-5'-P-CCNC: 8 U/L (ref 7–45)
ANION GAP SERPL CALC-SCNC: 10 MMOL/L (ref 10–20)
ANION GAP SERPL CALC-SCNC: 14 MMOL/L (ref 10–20)
ANTIBODY SCREEN: NORMAL
APTT PPP: 31 SECONDS (ref 27–38)
AST SERPL W P-5'-P-CCNC: 13 U/L (ref 9–39)
AST SERPL W P-5'-P-CCNC: 31 U/L (ref 9–39)
BASOPHILS # BLD AUTO: 0.01 X10*3/UL (ref 0–0.1)
BASOPHILS # BLD MANUAL: 0 X10*3/UL (ref 0–0.1)
BASOPHILS NFR BLD AUTO: 0.1 %
BASOPHILS NFR BLD MANUAL: 0 %
BILIRUB SERPL-MCNC: 1.1 MG/DL (ref 0–1.2)
BILIRUB SERPL-MCNC: 1.2 MG/DL (ref 0–1.2)
BUN SERPL-MCNC: 10 MG/DL (ref 6–23)
BUN SERPL-MCNC: 13 MG/DL (ref 6–23)
BURR CELLS BLD QL SMEAR: NORMAL
CA-I BLD-SCNC: 1.07 MMOL/L (ref 1.1–1.33)
CALCIUM SERPL-MCNC: 8.7 MG/DL (ref 8.6–10.6)
CALCIUM SERPL-MCNC: 9.2 MG/DL (ref 8.6–10.3)
CHLORIDE SERPL-SCNC: 100 MMOL/L (ref 98–107)
CHLORIDE SERPL-SCNC: 103 MMOL/L (ref 98–107)
CO2 SERPL-SCNC: 27 MMOL/L (ref 21–32)
CO2 SERPL-SCNC: 29 MMOL/L (ref 21–32)
CREAT SERPL-MCNC: 1.13 MG/DL (ref 0.5–1.05)
CREAT SERPL-MCNC: 1.44 MG/DL (ref 0.5–1.05)
EGFRCR SERPLBLD CKD-EPI 2021: 43 ML/MIN/1.73M*2
EGFRCR SERPLBLD CKD-EPI 2021: 58 ML/MIN/1.73M*2
EOSINOPHIL # BLD AUTO: 0.22 X10*3/UL (ref 0–0.7)
EOSINOPHIL # BLD MANUAL: 0.12 X10*3/UL (ref 0–0.7)
EOSINOPHIL NFR BLD AUTO: 2.1 %
EOSINOPHIL NFR BLD MANUAL: 0.9 %
ERYTHROCYTE [DISTWIDTH] IN BLOOD BY AUTOMATED COUNT: 29.1 % (ref 11.5–14.5)
ERYTHROCYTE [DISTWIDTH] IN BLOOD BY AUTOMATED COUNT: 29.2 % (ref 11.5–14.5)
FLUAV RNA RESP QL NAA+PROBE: NOT DETECTED
FLUBV RNA RESP QL NAA+PROBE: NOT DETECTED
GLUCOSE SERPL-MCNC: 108 MG/DL (ref 74–99)
GLUCOSE SERPL-MCNC: 95 MG/DL (ref 74–99)
HCT VFR BLD AUTO: 30.6 % (ref 36–46)
HCT VFR BLD AUTO: 31.2 % (ref 36–46)
HGB BLD-MCNC: 10 G/DL (ref 12–16)
HGB BLD-MCNC: 9.6 G/DL (ref 12–16)
HGB RETIC QN: 21 PG (ref 28–38)
HGB RETIC QN: 21 PG (ref 28–38)
HOWELL-JOLLY BOD BLD QL SMEAR: PRESENT
HYPOCHROMIA BLD QL SMEAR: ABNORMAL
HYPOCHROMIA BLD QL SMEAR: NORMAL
IMM GRANULOCYTES # BLD AUTO: 0.04 X10*3/UL (ref 0–0.7)
IMM GRANULOCYTES # BLD AUTO: 0.05 X10*3/UL (ref 0–0.7)
IMM GRANULOCYTES NFR BLD AUTO: 0.4 % (ref 0–0.9)
IMM GRANULOCYTES NFR BLD AUTO: 0.4 % (ref 0–0.9)
IMMATURE RETIC FRACTION: 51.3 %
IMMATURE RETIC FRACTION: 53.6 %
INR PPP: 1.7 (ref 0.9–1.1)
LDH SERPL L TO P-CCNC: 168 U/L (ref 84–246)
LDH SERPL L TO P-CCNC: 329 U/L (ref 84–246)
LYMPHOCYTES # BLD AUTO: 1.61 X10*3/UL (ref 1.2–4.8)
LYMPHOCYTES # BLD MANUAL: 0.69 X10*3/UL (ref 1.2–4.8)
LYMPHOCYTES NFR BLD AUTO: 15.5 %
LYMPHOCYTES NFR BLD MANUAL: 5.2 %
MCH RBC QN AUTO: 22.9 PG (ref 26–34)
MCH RBC QN AUTO: 23 PG (ref 26–34)
MCHC RBC AUTO-ENTMCNC: 31.4 G/DL (ref 32–36)
MCHC RBC AUTO-ENTMCNC: 32.1 G/DL (ref 32–36)
MCV RBC AUTO: 72 FL (ref 80–100)
MCV RBC AUTO: 73 FL (ref 80–100)
MONOCYTES # BLD AUTO: 0.88 X10*3/UL (ref 0.1–1)
MONOCYTES # BLD MANUAL: 0.35 X10*3/UL (ref 0.1–1)
MONOCYTES NFR BLD AUTO: 8.5 %
MONOCYTES NFR BLD MANUAL: 2.6 %
MYELOCYTES # BLD MANUAL: 0.23 X10*3/UL
MYELOCYTES NFR BLD MANUAL: 1.7 %
NEUTROPHILS # BLD AUTO: 7.61 X10*3/UL (ref 1.2–7.7)
NEUTROPHILS NFR BLD AUTO: 73.4 %
NEUTS SEG # BLD MANUAL: 11.92 X10*3/UL (ref 1.2–7)
NEUTS SEG NFR BLD MANUAL: 89.6 %
NRBC BLD-RTO: 6.2 /100 WBCS (ref 0–0)
NRBC BLD-RTO: 8.7 /100 WBCS (ref 0–0)
PLATELET # BLD AUTO: 320 X10*3/UL (ref 150–450)
PLATELET # BLD AUTO: 350 X10*3/UL (ref 150–450)
POLYCHROMASIA BLD QL SMEAR: ABNORMAL
POLYCHROMASIA BLD QL SMEAR: NORMAL
POTASSIUM SERPL-SCNC: 3.9 MMOL/L (ref 3.5–5.3)
POTASSIUM SERPL-SCNC: 5.4 MMOL/L (ref 3.5–5.3)
PROT SERPL-MCNC: 7 G/DL (ref 6.4–8.2)
PROT SERPL-MCNC: 7.1 G/DL (ref 6.4–8.2)
PROTHROMBIN TIME: 19.3 SECONDS (ref 9.8–12.8)
RBC # BLD AUTO: 4.2 X10*6/UL (ref 4–5.2)
RBC # BLD AUTO: 4.34 X10*6/UL (ref 4–5.2)
RBC MORPH BLD: ABNORMAL
RBC MORPH BLD: NORMAL
RETICS #: 0.02 X10*6/UL (ref 0.02–0.08)
RETICS #: 0.02 X10*6/UL (ref 0.02–0.08)
RETICS/RBC NFR AUTO: 0.5 % (ref 0.5–2)
RETICS/RBC NFR AUTO: 0.5 % (ref 0.5–2)
RH FACTOR (ANTIGEN D): NORMAL
SARS-COV-2 RNA RESP QL NAA+PROBE: NOT DETECTED
SCHISTOCYTES BLD QL SMEAR: NORMAL
SICKLE CELLS BLD QL SMEAR: NORMAL
SODIUM SERPL-SCNC: 136 MMOL/L (ref 136–145)
SODIUM SERPL-SCNC: 138 MMOL/L (ref 136–145)
TARGETS BLD QL SMEAR: ABNORMAL
TARGETS BLD QL SMEAR: NORMAL
TOTAL CELLS COUNTED BLD: 115
WBC # BLD AUTO: 10.4 X10*3/UL (ref 4.4–11.3)
WBC # BLD AUTO: 13.3 X10*3/UL (ref 4.4–11.3)

## 2024-02-28 PROCEDURE — 86900 BLOOD TYPING SEROLOGIC ABO: CPT | Mod: 91 | Performed by: NURSE PRACTITIONER

## 2024-02-28 PROCEDURE — 2500000004 HC RX 250 GENERAL PHARMACY W/ HCPCS (ALT 636 FOR OP/ED): Performed by: NURSE PRACTITIONER

## 2024-02-28 PROCEDURE — 85045 AUTOMATED RETICULOCYTE COUNT: CPT | Mod: 91,MUE

## 2024-02-28 PROCEDURE — 2500000004 HC RX 250 GENERAL PHARMACY W/ HCPCS (ALT 636 FOR OP/ED)

## 2024-02-28 PROCEDURE — 1170000001 HC PRIVATE ONCOLOGY ROOM DAILY

## 2024-02-28 PROCEDURE — 82330 ASSAY OF CALCIUM: CPT | Performed by: NURSE PRACTITIONER

## 2024-02-28 PROCEDURE — 83615 LACTATE (LD) (LDH) ENZYME: CPT | Mod: 91

## 2024-02-28 PROCEDURE — 2500000005 HC RX 250 GENERAL PHARMACY W/O HCPCS

## 2024-02-28 PROCEDURE — 80053 COMPREHEN METABOLIC PANEL: CPT | Mod: 91

## 2024-02-28 PROCEDURE — 85730 THROMBOPLASTIN TIME PARTIAL: CPT | Mod: 91 | Performed by: NURSE PRACTITIONER

## 2024-02-28 PROCEDURE — 86922 COMPATIBILITY TEST ANTIGLOB: CPT | Mod: 91

## 2024-02-28 PROCEDURE — 85045 AUTOMATED RETICULOCYTE COUNT: CPT

## 2024-02-28 PROCEDURE — 85027 COMPLETE CBC AUTOMATED: CPT

## 2024-02-28 PROCEDURE — 99215 OFFICE O/P EST HI 40 MIN: CPT | Performed by: NURSE PRACTITIONER

## 2024-02-28 PROCEDURE — 99223 1ST HOSP IP/OBS HIGH 75: CPT

## 2024-02-28 PROCEDURE — 85007 BL SMEAR W/DIFF WBC COUNT: CPT

## 2024-02-28 PROCEDURE — 2500000001 HC RX 250 WO HCPCS SELF ADMINISTERED DRUGS (ALT 637 FOR MEDICARE OP): Performed by: NURSE PRACTITIONER

## 2024-02-28 PROCEDURE — 96372 THER/PROPH/DIAG INJ SC/IM: CPT | Performed by: NURSE PRACTITIONER

## 2024-02-28 PROCEDURE — 85025 COMPLETE CBC W/AUTO DIFF WBC: CPT

## 2024-02-28 PROCEDURE — 83021 HEMOGLOBIN CHROMOTOGRAPHY: CPT | Performed by: NURSE PRACTITIONER

## 2024-02-28 PROCEDURE — 83020 HEMOGLOBIN ELECTROPHORESIS: CPT | Performed by: NURSE PRACTITIONER

## 2024-02-28 PROCEDURE — 36415 COLL VENOUS BLD VENIPUNCTURE: CPT

## 2024-02-28 PROCEDURE — 2500000005 HC RX 250 GENERAL PHARMACY W/O HCPCS: Performed by: NURSE PRACTITIONER

## 2024-02-28 PROCEDURE — 99215 OFFICE O/P EST HI 40 MIN: CPT | Mod: ZK | Performed by: NURSE PRACTITIONER

## 2024-02-28 PROCEDURE — 99417 PROLNG OP E/M EACH 15 MIN: CPT | Mod: ZK | Performed by: NURSE PRACTITIONER

## 2024-02-28 PROCEDURE — G2212 PROLONG OUTPT/OFFICE VIS: HCPCS | Performed by: NURSE PRACTITIONER

## 2024-02-28 PROCEDURE — 83615 LACTATE (LD) (LDH) ENZYME: CPT

## 2024-02-28 PROCEDURE — 80053 COMPREHEN METABOLIC PANEL: CPT

## 2024-02-28 PROCEDURE — 96372 THER/PROPH/DIAG INJ SC/IM: CPT

## 2024-02-28 PROCEDURE — 2500000001 HC RX 250 WO HCPCS SELF ADMINISTERED DRUGS (ALT 637 FOR MEDICARE OP)

## 2024-02-28 RX ORDER — FUROSEMIDE 20 MG/1
20 TABLET ORAL EVERY OTHER DAY
Status: DISCONTINUED | OUTPATIENT
Start: 2024-02-29 | End: 2024-03-03 | Stop reason: HOSPADM

## 2024-02-28 RX ORDER — FOLIC ACID 1 MG/1
1 TABLET ORAL DAILY
Status: DISCONTINUED | OUTPATIENT
Start: 2024-02-28 | End: 2024-03-03 | Stop reason: HOSPADM

## 2024-02-28 RX ORDER — DIPHENHYDRAMINE HCL 25 MG
25 CAPSULE ORAL ONCE
Status: COMPLETED | OUTPATIENT
Start: 2024-02-28 | End: 2024-02-28

## 2024-02-28 RX ORDER — ONDANSETRON HYDROCHLORIDE 8 MG/1
8 TABLET, FILM COATED ORAL ONCE
Status: COMPLETED | OUTPATIENT
Start: 2024-02-28 | End: 2024-02-28

## 2024-02-28 RX ORDER — HYDROMORPHONE HYDROCHLORIDE 1 MG/ML
1 INJECTION, SOLUTION INTRAMUSCULAR; INTRAVENOUS; SUBCUTANEOUS
Status: DISCONTINUED | OUTPATIENT
Start: 2024-02-28 | End: 2024-03-03 | Stop reason: HOSPADM

## 2024-02-28 RX ORDER — LORATADINE 10 MG/1
10 TABLET ORAL DAILY PRN
Status: DISCONTINUED | OUTPATIENT
Start: 2024-02-28 | End: 2024-03-03 | Stop reason: HOSPADM

## 2024-02-28 RX ORDER — FLUTICASONE PROPIONATE 50 MCG
2 SPRAY, SUSPENSION (ML) NASAL AS NEEDED
Status: DISCONTINUED | OUTPATIENT
Start: 2024-02-28 | End: 2024-03-03 | Stop reason: HOSPADM

## 2024-02-28 RX ORDER — MULTIVIT-MIN/IRON FUM/FOLIC AC 7.5 MG-4
1 TABLET ORAL
Status: DISCONTINUED | OUTPATIENT
Start: 2024-02-28 | End: 2024-03-03 | Stop reason: HOSPADM

## 2024-02-28 RX ORDER — POLYETHYLENE GLYCOL 3350 17 G/17G
17 POWDER, FOR SOLUTION ORAL DAILY
Status: DISCONTINUED | OUTPATIENT
Start: 2024-02-28 | End: 2024-03-03 | Stop reason: HOSPADM

## 2024-02-28 RX ORDER — WARFARIN SODIUM 5 MG/1
5 TABLET ORAL DAILY
Status: DISCONTINUED | OUTPATIENT
Start: 2024-02-28 | End: 2024-03-03 | Stop reason: HOSPADM

## 2024-02-28 RX ORDER — AMOXICILLIN 250 MG
2 CAPSULE ORAL 2 TIMES DAILY
Status: DISCONTINUED | OUTPATIENT
Start: 2024-02-28 | End: 2024-03-03 | Stop reason: HOSPADM

## 2024-02-28 RX ORDER — NALOXONE HYDROCHLORIDE 0.4 MG/ML
0.4 INJECTION, SOLUTION INTRAMUSCULAR; INTRAVENOUS; SUBCUTANEOUS
Status: DISCONTINUED | OUTPATIENT
Start: 2024-02-28 | End: 2024-02-28 | Stop reason: HOSPADM

## 2024-02-28 RX ORDER — METOPROLOL TARTRATE 25 MG/1
12.5 TABLET, FILM COATED ORAL 2 TIMES DAILY
Status: DISCONTINUED | OUTPATIENT
Start: 2024-02-28 | End: 2024-03-03 | Stop reason: HOSPADM

## 2024-02-28 RX ORDER — ONDANSETRON 4 MG/1
4 TABLET, FILM COATED ORAL EVERY 8 HOURS PRN
Status: DISCONTINUED | OUTPATIENT
Start: 2024-02-28 | End: 2024-03-03 | Stop reason: HOSPADM

## 2024-02-28 RX ORDER — ALBUTEROL SULFATE 90 UG/1
2 AEROSOL, METERED RESPIRATORY (INHALATION) EVERY 6 HOURS PRN
Status: DISCONTINUED | OUTPATIENT
Start: 2024-02-28 | End: 2024-03-03 | Stop reason: HOSPADM

## 2024-02-28 RX ORDER — DULOXETIN HYDROCHLORIDE 20 MG/1
40 CAPSULE, DELAYED RELEASE ORAL DAILY
Status: DISCONTINUED | OUTPATIENT
Start: 2024-02-28 | End: 2024-03-03 | Stop reason: HOSPADM

## 2024-02-28 RX ADMIN — HYDROMORPHONE HYDROCHLORIDE 1 MG: 2 INJECTION, SOLUTION INTRAMUSCULAR; INTRAVENOUS; SUBCUTANEOUS at 11:19

## 2024-02-28 RX ADMIN — HYDROMORPHONE HYDROCHLORIDE 1 MG: 1 INJECTION, SOLUTION INTRAMUSCULAR; INTRAVENOUS; SUBCUTANEOUS at 16:06

## 2024-02-28 RX ADMIN — ONDANSETRON HYDROCHLORIDE 8 MG: 8 TABLET, FILM COATED ORAL at 10:16

## 2024-02-28 RX ADMIN — DULOXETINE 40 MG: 20 CAPSULE, DELAYED RELEASE ORAL at 16:10

## 2024-02-28 RX ADMIN — DIPHENHYDRAMINE HYDROCHLORIDE 25 MG: 25 CAPSULE ORAL at 10:15

## 2024-02-28 RX ADMIN — FOLIC ACID 1 MG: 1 TABLET ORAL at 14:32

## 2024-02-28 RX ADMIN — HYDROMORPHONE HYDROCHLORIDE 1 MG: 2 INJECTION, SOLUTION INTRAMUSCULAR; INTRAVENOUS; SUBCUTANEOUS at 12:35

## 2024-02-28 RX ADMIN — HYDROMORPHONE HYDROCHLORIDE 1 MG: 1 INJECTION, SOLUTION INTRAMUSCULAR; INTRAVENOUS; SUBCUTANEOUS at 22:52

## 2024-02-28 RX ADMIN — METOPROLOL TARTRATE 12.5 MG: 25 TABLET, FILM COATED ORAL at 20:40

## 2024-02-28 RX ADMIN — LORATADINE 10 MG: 10 TABLET ORAL at 14:32

## 2024-02-28 RX ADMIN — HYDROMORPHONE HYDROCHLORIDE 1 MG: 2 INJECTION, SOLUTION INTRAMUSCULAR; INTRAVENOUS; SUBCUTANEOUS at 10:13

## 2024-02-28 RX ADMIN — Medication 2 L/MIN: at 13:45

## 2024-02-28 RX ADMIN — SENNOSIDES AND DOCUSATE SODIUM 2 TABLET: 8.6; 5 TABLET ORAL at 20:40

## 2024-02-28 RX ADMIN — Medication 1 TABLET: at 14:32

## 2024-02-28 RX ADMIN — POLYETHYLENE GLYCOL 3350 17 G: 17 POWDER, FOR SOLUTION ORAL at 14:32

## 2024-02-28 RX ADMIN — HYDROMORPHONE HYDROCHLORIDE 1 MG: 1 INJECTION, SOLUTION INTRAMUSCULAR; INTRAVENOUS; SUBCUTANEOUS at 19:20

## 2024-02-28 SDOH — SOCIAL STABILITY: SOCIAL INSECURITY: ABUSE: ADULT

## 2024-02-28 SDOH — SOCIAL STABILITY: SOCIAL INSECURITY: DO YOU FEEL UNSAFE GOING BACK TO THE PLACE WHERE YOU ARE LIVING?: NO

## 2024-02-28 SDOH — SOCIAL STABILITY: SOCIAL INSECURITY: ARE YOU OR HAVE YOU BEEN THREATENED OR ABUSED PHYSICALLY, EMOTIONALLY, OR SEXUALLY BY ANYONE?: NO

## 2024-02-28 SDOH — SOCIAL STABILITY: SOCIAL INSECURITY: WERE YOU ABLE TO COMPLETE ALL THE BEHAVIORAL HEALTH SCREENINGS?: YES

## 2024-02-28 SDOH — SOCIAL STABILITY: SOCIAL INSECURITY: DO YOU FEEL ANYONE HAS EXPLOITED OR TAKEN ADVANTAGE OF YOU FINANCIALLY OR OF YOUR PERSONAL PROPERTY?: NO

## 2024-02-28 SDOH — SOCIAL STABILITY: SOCIAL INSECURITY: ARE THERE ANY APPARENT SIGNS OF INJURIES/BEHAVIORS THAT COULD BE RELATED TO ABUSE/NEGLECT?: NO

## 2024-02-28 SDOH — SOCIAL STABILITY: SOCIAL INSECURITY: DOES ANYONE TRY TO KEEP YOU FROM HAVING/CONTACTING OTHER FRIENDS OR DOING THINGS OUTSIDE YOUR HOME?: NO

## 2024-02-28 SDOH — SOCIAL STABILITY: SOCIAL INSECURITY: HAS ANYONE EVER THREATENED TO HURT YOUR FAMILY OR YOUR PETS?: NO

## 2024-02-28 SDOH — SOCIAL STABILITY: SOCIAL INSECURITY: HAVE YOU HAD THOUGHTS OF HARMING ANYONE ELSE?: NO

## 2024-02-28 ASSESSMENT — LIFESTYLE VARIABLES
AUDIT-C TOTAL SCORE: 0
HOW MANY STANDARD DRINKS CONTAINING ALCOHOL DO YOU HAVE ON A TYPICAL DAY: PATIENT DOES NOT DRINK
SKIP TO QUESTIONS 9-10: 1
AUDIT-C TOTAL SCORE: 0
HOW OFTEN DO YOU HAVE A DRINK CONTAINING ALCOHOL: NEVER
HOW OFTEN DO YOU HAVE 6 OR MORE DRINKS ON ONE OCCASION: NEVER

## 2024-02-28 ASSESSMENT — ACTIVITIES OF DAILY LIVING (ADL)
BATHING: INDEPENDENT
LACK_OF_TRANSPORTATION: PATIENT DECLINED
HEARING - RIGHT EAR: FUNCTIONAL
HEARING - LEFT EAR: FUNCTIONAL
GROOMING: INDEPENDENT
DRESSING YOURSELF: INDEPENDENT
PATIENT'S MEMORY ADEQUATE TO SAFELY COMPLETE DAILY ACTIVITIES?: YES
FEEDING YOURSELF: INDEPENDENT
FEEDING YOURSELF: INDEPENDENT
JUDGMENT_ADEQUATE_SAFELY_COMPLETE_DAILY_ACTIVITIES: YES
HEARING - LEFT EAR: FUNCTIONAL
HEARING - RIGHT EAR: FUNCTIONAL
TOILETING: INDEPENDENT
GROOMING: INDEPENDENT
WALKS IN HOME: INDEPENDENT
JUDGMENT_ADEQUATE_SAFELY_COMPLETE_DAILY_ACTIVITIES: YES
ADEQUATE_TO_COMPLETE_ADL: YES
PATIENT'S MEMORY ADEQUATE TO SAFELY COMPLETE DAILY ACTIVITIES?: YES
WALKS IN HOME: INDEPENDENT
ADEQUATE_TO_COMPLETE_ADL: YES
TOILETING: INDEPENDENT
DRESSING YOURSELF: INDEPENDENT

## 2024-02-28 ASSESSMENT — ENCOUNTER SYMPTOMS
CARDIOVASCULAR NEGATIVE: 1
NEUROLOGICAL NEGATIVE: 1
GASTROINTESTINAL NEGATIVE: 1
PSYCHIATRIC NEGATIVE: 1
SHORTNESS OF BREATH: 1
BACK PAIN: 1
FACIAL SWELLING: 1
CONSTITUTIONAL NEGATIVE: 1

## 2024-02-28 ASSESSMENT — COGNITIVE AND FUNCTIONAL STATUS - GENERAL
PATIENT BASELINE BEDBOUND: NO
DAILY ACTIVITIY SCORE: 24
MOBILITY SCORE: 24

## 2024-02-28 ASSESSMENT — PAIN SCALES - GENERAL
PAINLEVEL_OUTOF10: 7
PAINLEVEL_OUTOF10: 8
PAINLEVEL_OUTOF10: 6
PAINLEVEL_OUTOF10: 10 - WORST POSSIBLE PAIN
PAINLEVEL_OUTOF10: 8
PAINLEVEL_OUTOF10: 9
PAINLEVEL_OUTOF10: 8
PAINLEVEL: 10-WORST PAIN EVER
PAINLEVEL_OUTOF10: 8
PAINLEVEL_OUTOF10: 8

## 2024-02-28 ASSESSMENT — PAIN - FUNCTIONAL ASSESSMENT
PAIN_FUNCTIONAL_ASSESSMENT: 0-10

## 2024-02-28 ASSESSMENT — COLUMBIA-SUICIDE SEVERITY RATING SCALE - C-SSRS
6. HAVE YOU EVER DONE ANYTHING, STARTED TO DO ANYTHING, OR PREPARED TO DO ANYTHING TO END YOUR LIFE?: NO
2. HAVE YOU ACTUALLY HAD ANY THOUGHTS OF KILLING YOURSELF?: NO
1. IN THE PAST MONTH, HAVE YOU WISHED YOU WERE DEAD OR WISHED YOU COULD GO TO SLEEP AND NOT WAKE UP?: NO

## 2024-02-28 ASSESSMENT — PAIN DESCRIPTION - LOCATION
LOCATION: OTHER (COMMENT)
LOCATION: OTHER (COMMENT)

## 2024-02-28 ASSESSMENT — PATIENT HEALTH QUESTIONNAIRE - PHQ9
1. LITTLE INTEREST OR PLEASURE IN DOING THINGS: NOT AT ALL
2. FEELING DOWN, DEPRESSED OR HOPELESS: NOT AT ALL
SUM OF ALL RESPONSES TO PHQ9 QUESTIONS 1 & 2: 0

## 2024-02-28 ASSESSMENT — PAIN DESCRIPTION - ORIENTATION: ORIENTATION: OTHER (COMMENT)

## 2024-02-28 NOTE — PROGRESS NOTES
Patient ID:  Senait Narvaez is a 54 y.o. female.  Subjective   Chief Complaint: Uncontrolled Pain    HPI  Senait is a 54 y.o. 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 Her pain is all over and has been going on since her birthday party mid February but now feels even worse. The pain is consistent with her sickle cell pain. She has tried managing with her home oxycodone. She does have some SOB that is with activity but she feels this is new. She was seen in [facility] on 2/27 and admission order was placed during her visit but a bed was not available and she decided to wait for a bed at home. She returns this am still with uncontrolled pain.  Pt denies chest pain, palpitations, blurry vision, falls, fever or chills, abd pain, or urinary complaints.         ROS  Review of Systems - Oncology     Allergies  No Known Allergies     Medications  Current Outpatient Medications   Medication Instructions    albuterol 90 mcg/actuation inhaler 2 puffs, inhalation, Every 6 hours PRN    ascorbic acid (VITAMIN C) 500 mg, oral, Daily    DULoxetine (CYMBALTA) 40 mg, oral, Daily, Do not crush or chew.    elderberry fruit 350 mg capsule 2 capsules, oral, Daily    ergocalciferol (VITAMIN D-2) 50,000 Units, oral, Weekly    fluticasone (Flonase) 50 mcg/actuation nasal spray 2 sprays, Each Nostril, As needed    folic acid (FOLVITE) 1 mg, oral, Daily    furosemide (LASIX) 20 mg, oral, Every other day    loratadine (Claritin) 10 mg tablet 1 tablet, oral, Daily PRN    metoprolol tartrate (LOPRESSOR) 12.5 mg, oral, 2 times daily    multivitamin with minerals tablet 1 tablet, oral, Daily RT    naloxone (NARCAN) 4 mg, nasal, As needed, May repeat every 2-3 minutes if needed, alternating  nostrils, until medical assistance becomes available.    ondansetron (ZOFRAN) 4 mg, oral, Every 8 hours PRN    oxyCODONE (ROXICODONE) 10 mg, oral, Every 4 hours PRN    oxygen (O2) gas therapy 1 each, inhalation, Continuous    oxygen (O2) gas therapy 1 each, inhalation, Continuous    oxygen (O2) gas therapy 1 each, inhalation, Continuous    sennosides-docusate sodium (Nita-Colace) 8.6-50 mg tablet 2 tablets, oral, Every 12 hours    traZODone (DESYREL) 50 mg, oral, Nightly PRN, 1/2 - 1 tabs for insomnia    warfarin (COUMADIN) 5 mg, oral, Every evening        Past Medical History:   Past Medical History:  No date: Asthma  No date: CHF (congestive heart failure) (CMS/HCC)  No date: Chronic pain disorder  02/19/2020: Compression of vein      Comment:  Superior vena cava syndrome  12/10/2020: Hypotension, unspecified   Surgical History:    Past Surgical History:   Procedure Laterality Date    APPENDECTOMY  08/05/2013    Appendectomy    CHOLECYSTECTOMY  08/05/2013    Cholecystectomy    CT ANGIO NECK  10/19/2022    CT NECK ANGIO W AND WO IV CONTRAST 10/19/2022 [facility] CLINICAL LEGACY    CT GUIDED PERCUTANEOUS BIOPSY LYMPH NODE SUPERFICIAL  9/19/2019    CT GUIDED PERCUTANEOUS BIOPSY LYMPH NODE SUPERFICIAL 9/19/2019 [facility]PATIENT LEGACY    IR CVC NONTUNNELED  10/26/2023    IR CVC NONTUNNELED 10/26/2023 [facility] ANGIO    IR CVC NONTUNNELED  12/7/2023    IR CVC NONTUNNELED 12/7/2023 Marcos Moser MD [facility] ANGIO    IR CVC TUNNELED  3/13/2015    IR CVC TUNNELED 3/13/2015 [facility] CLINICAL LEGACY    IR CVC TUNNELED  1/29/2016    IR CVC TUNNELED 1/29/2016 [facility] AIB LEGACY    IR CVC TUNNELED  1/17/2018    IR CVC TUNNELED 1/17/2018 [facility] AIB LEGACY    IR CVC TUNNELED  2/22/2018    IR CVC TUNNELED 2/22/2018 [facility] AIB LEGACY    IR CVC TUNNELED  3/22/2018    IR CVC TUNNELED 3/22/2018 [facility] CLINICAL LEGACY    IR CVC TUNNELED  4/18/2018    IR CVC TUNNELED 4/18/2018 [facility] AIB LEGACY    IR CVC TUNNELED  5/16/2018    IR CVC TUNNELED 5/16/2018 [facility] AIB  LEGACY    IR CVC TUNNELED  6/12/2018    IR CVC TUNNELED 6/12/2018 [facility] AIB LEGACY    IR CVC TUNNELED  1/21/2020    IR CVC TUNNELED 1/21/2020 [facility] INPATIENT LEGACY    IR CVC TUNNELED  2/28/2020    IR CVC TUNNELED 2/28/2020 [facility] ANCILLARY LEGACY    IR CVC TUNNELED  4/10/2020    IR CVC TUNNELED 4/10/2020 [facility] AIB LEGACY    IR CVC TUNNELED  5/22/2020    IR CVC TUNNELED 5/22/2020 [facility] ANCILLARY LEGACY    IR CVC TUNNELED  6/19/2020    IR CVC TUNNELED 6/19/2020 [facility] AIB LEGACY    IR CVC TUNNELED  7/23/2020    IR CVC TUNNELED 7/23/2020 [facility] AIB LEGACY    IR CVC TUNNELED  9/4/2020    IR CVC TUNNELED 9/4/2020 [facility] AIB LEGACY    IR CVC TUNNELED  10/9/2020    IR CVC TUNNELED 10/9/2020 [facility] ANCILLARY LEGACY    IR CVC TUNNELED  11/13/2020    IR CVC TUNNELED 11/13/2020 [facility] AIB LEGACY    IR CVC TUNNELED  12/18/2020    IR CVC TUNNELED 12/18/2020 [facility] AIB LEGACY    IR CVC TUNNELED  1/22/2021    IR CVC TUNNELED 1/22/2021 [facility] AIB LEGACY    IR CVC TUNNELED  2/26/2021    IR CVC TUNNELED 2/26/2021 [facility] CLINICAL LEGACY    IR CVC TUNNELED  4/2/2021    IR CVC TUNNELED 4/2/2021 [facility] AIB LEGACY    IR CVC TUNNELED  5/7/2021    IR CVC TUNNELED 5/7/2021 [facility] ANCILLARY LEGACY    IR CVC TUNNELED  6/11/2021    IR CVC TUNNELED 6/11/2021 [facility] AIB LEGACY    IR CVC TUNNELED  7/16/2021    IR CVC TUNNELED 7/16/2021 [facility] ANCILLARY LEGACY    IR CVC TUNNELED  8/20/2021    IR CVC TUNNELED 8/20/2021 [facility] AIB LEGACY    IR CVC TUNNELED  9/24/2021    IR CVC TUNNELED 9/24/2021 [facility] AIB LEGACY    IR CVC TUNNELED  10/29/2021    IR CVC TUNNELED 10/29/2021 [facility]IB LEGACY    IR CVC TUNNELED  12/3/2021    IR CVC TUNNELED 12/3/2021 [facility] AIB LEGACY    IR CVC TUNNELED  1/7/2022    IR CVC TUNNELED 1/7/2022 [facility] ANCILLARY LEGACY    IR CVC TUNNELED  2/23/2022    IR CVC TUNNELED 2/23/2022 [facility] CLINICAL LEGACY    IR CVC TUNNELED  3/25/2022    IR CVC TUNNELED 3/25/2022 [facility] ANCILLARY LEGACY    IR CVC TUNNELED  4/22/2022    IR CVC TUNNELED 4/22/2022 [facility] ANCILLARY LEGACY    IR CVC TUNNELED  6/3/2022     IR CVC TUNNELED 6/3/2022 [facility] ANCILLARY LEGACY    IR CVC TUNNELED  9/18/2017    IR CVC TUNNELED 9/18/2017 [facility]IB LEGACY    IR CVC TUNNELED  10/30/2017    IR CVC TUNNELED 10/30/2017 [facility]IB LEGAC[facility]    IR CVC TUNNELED  12/19/2017    IR CVC TUNNELED 12/19/2017 [facility]IB LEGACY    IR CVC TUNNELED  1/6/2023    IR CVC TUNNELED 1/6/2023 [facility] LEGACY    IR CVC TUNNELED  2/24/2023    IR CVC TUNNELED [facility] ANGIO    IR CVC TUNNELED  7/8/2022    IR CVC TUNNELED 7/8/2022 [facility]CILLARY LEGACY    IR CVC TUNNELED  8/12/2022    IR CVC TUNNELED 8/12/2022 [facility] CLINICAL LEGACY    IR CVC TUNNELED  9/16/2022    IR CVC TUNNELED 9/16/2022 [facility]CI[facility]GAC[facility]    IR CVC TUNNELED  10/20/2022    IR CVC TUNNELED 10/20/2022 [facility] CLINICAL LEGACY    IR CVC TUNNELED  11/29/2022    IR CVC TUNNELED 11/29/2022 [facility]CILLARY LEGAC[facility]    IR CVC TUNNELED  3/31/2023    IR CVC TUNNELED [facility] ANGIO    IR CVC TUNNELED  6/9/2023    IR CVC TUNNELED 6/9/2023 [facility]GIO    IR CVC TUNNELED  7/20/2023    IR CVC TUNNELED 7/20/2023 [facility]GIO    IR CVC TUNNELED  8/16/2023    IR CVC TUNNELED 8/16/2023 [facility] ANGIO    IR CVC TUNNELED  9/21/2023    IR CVC TUNNELED 9/21/2023 [facility] ANGIO    MR HEAD ANGIO WO IV CONTRAST  8/16/2014    MR HEAD ANGIO WO IV CONTRAST 8/16/2014 [facility] ANCILLARY LEGACY    MR NECK ANGIO WO IV CONTRAST  8/16/2014    MR NECK ANGIO WO IV CONTRAST 8/16/2014 [facility] ANCILLARY LEGACY    OTHER SURGICAL HISTORY  05/13/2014    Fluid Drained, Retina Inspected: Breaks Supported By Buckle    OTHER SURGICAL HISTORY  10/09/2014    Closed Treatment Of Fracture Of The Lateral Malleolus    OTHER SURGICAL HISTORY  06/09/2014    Salpingo-oophorectomy Right Side    US GUIDED BIOPSY LYMPH NODE SUPERFICIAL  9/17/2019    US GUIDED BIOPSY LYMPH NODE SUPERFICIAL 9/17/2019 [facility] INPATIENT LEGACY      Family History:    Family History   Problem Relation Name Age of Onset    Diabetes Father      Gout Father      Sickle cell trait Father      Seizures Sister      Diabetes Other       Uterine cancer Other      Other (congenital blindness) Cousin       Family Oncology History:    Cancer-related family history includes Uterine cancer in an other family member.  Social History:    Social History     Tobacco Use    Smoking status: Former     Types: Cigarettes     Quit date: 2014     Years since quitting: 10.1     Passive exposure: Past    Smokeless tobacco: Never   Substance Use Topics    Alcohol use: Not Currently    Drug use: Not Currently        Objective   Vitals: /59 (BP Location: Right arm, Patient Position: Sitting, BP Cuff Size: Large adult)   Pulse 87   Temp 37.2 °C (99 °F) (Temporal)   Resp 18   Wt 109 kg (240 lb 8.4 oz)   SpO2 100%   BMI 40.02 kg/m²   Weight:   Vitals:    02/28/24 0955   Weight: 109 kg (240 lb 8.4 oz)       Physical Exam  Vitals reviewed.   Constitutional:       Appearance: Normal appearance.   HENT:      Head: Normocephalic and atraumatic.      Nose: Congestion present.      Mouth/Throat:      Mouth: Mucous membranes are moist.      Pharynx: Oropharynx is clear. No posterior oropharyngeal erythema.   Eyes:      Conjunctiva/sclera: Conjunctivae normal.      Pupils: Pupils are equal, round, and reactive to light.   Cardiovascular:      Rate and Rhythm: Normal rate and regular rhythm.      Pulses: Normal pulses.      Heart sounds: Normal heart sounds.   Pulmonary:      Effort: Pulmonary effort is normal.      Breath sounds: Normal breath sounds.   Abdominal:      General: Abdomen is flat. Bowel sounds are normal. There is no distension.      Palpations: Abdomen is soft.      Tenderness: There is no abdominal tenderness.   Musculoskeletal:         General: No swelling.      Cervical back: Normal range of motion and neck supple.      Right lower leg: No edema.      Left lower leg: No edema.   Skin:     General: Skin is warm and dry.   Neurological:      General: No focal deficit present.      Mental Status: She is alert and oriented to person, place, and time.    Psychiatric:         Behavior: Behavior normal.       Diagnostic Results     Labs  Results from last 7 days   Lab Units 02/28/24  1020 02/27/24  1313   WBC AUTO x10*3/uL 10.4 9.7   HEMOGLOBIN g/dL 10.0* 10.3*   HEMATOCRIT % 31.2* 31.9*   PLATELETS AUTO x10*3/uL 350 370   NEUTROS ABS x10*3/uL 7.61 7.35   LYMPHS ABS AUTO x10*3/uL 1.61 1.61   MONOS ABS AUTO x10*3/uL 0.88 0.53   EOS ABS AUTO x10*3/uL 0.22 0.12   NEUTROS PCT AUTO % 73.4 76.2   LYMPHS PCT AUTO % 15.5 16.7   MONOS PCT AUTO % 8.5 5.5   EOS PCT AUTO % 2.1 1.2        Results from last 7 days   Lab Units 02/28/24  1020 02/27/24  1313   GLUCOSE mg/dL 95 107*   SODIUM mmol/L 138 138   POTASSIUM mmol/L 3.9 3.9   CHLORIDE mmol/L 103 103   CO2 mmol/L 29 28   BUN mg/dL 10 9   CREATININE mg/dL 1.13* 0.90   EGFR mL/min/1.73m*2 58* 76   CALCIUM mg/dL 9.2 9.2   ALBUMIN g/dL 3.7 3.7   PROTEIN TOTAL g/dL 7.1 7.3       Results from last 7 days   Lab Units 02/28/24  1020 02/27/24  1313   BILIRUBIN TOTAL mg/dL 1.1 1.1   ALK PHOS U/L 104 106   ALT U/L 5* 4*   AST U/L 13 11           Results from last 7 days   Lab Units 02/28/24  1020 02/27/24  1313   RETIC CT PCT % 0.5 1.0   RETIC CT ABS x10*6/uL 0.020 0.044   IMMATURE RETIC FRACTION % 53.6* 58.3*   RETIC HGB pg 21* 23*     Results from last 7 days   Lab Units 02/28/24  1020 02/27/24  1313   LD U/L 168 161         Lab Results   Component Value Date    HGB 10.0 (L) 02/28/2024    HGB 10.3 (L) 02/27/2024    HGB 8.7 (L) 01/23/2024     02/28/2024     02/27/2024     01/23/2024     02/28/2024     02/27/2024     01/22/2024       Images  === 02/27/24 ===    XR CHEST 2 VIEWS (Wet Read)  This result has not been signed. Information might be incomplete.    - Impression -  1. No acute cardiopulmonary process.  2. Persistent blunting of the left costophrenic angle with associated  streaky, linear opacities, likely representing subsegmental  atelectasis versus scarring.    I personally reviewed  the images/study, and I agree with the findings  as stated above. This study was interpreted at [facility], [place], [place].    MACRO:  None      Dictation workstation:   [id number]     Transthoracic Echo (TTE) Complete    Result Date: 1/23/2024   [facility], [address] [place] [address]                Tel [phone] and Fax [phone] TRANSTHORACIC ECHOCARDIOGRAM REPORT  Patient Name:     KIRSTY Saenz Physician:  [id number] Ag Houston MD Study Date:       1/23/2024           Ordering Provider:  [id number] PENG REARDON MRN/PID:          [id number]            Fellow: Accession#:       [id number]        Nurse: Date of           1970 / 53      Sonographer:        Sona Jiménez RDCS Birth/Age:        years Gender:           F                   Additional Staff: Height:           165.10 cm           Admit Date:         1/8/2024 Weight:           107.50 kg           Admission Status:   Inpatient - Routine BSA:              2.13 m2             Encounter#:         [id number]                                       [place] [address]                                       Location: Blood Pressure: 110 /46 mmHg Study Type:    TRANSTHORACIC ECHO (TTE) COMPLETE Diagnosis/ICD: Sickle cell disease without crisis-D57.1 Indication:    Sickle cell disease without crisis; Thrombosis of superior vena                cava CPT Code:      Echo Complete w Full [id number] Patient History: Pertinent History: Previous DVT, Cardiomyopathy, Chest Pain and PE. Sickle cell                    disease; Pulm HTN; CHARLES. Study Detail: The following Echo studies were performed: 2D, M-Mode, Doppler and               color flow. Technically challenging study due to body habitus and               poor acoustic windows. Definity used as a contrast agent for               endocardial border definition.  Total contrast used for this               procedure was 4 mL via IV push.  PHYSICIAN INTERPRETATION: Left Ventricle: The left ventricular systolic function is normal, with an estimated ejection fraction of 60-65%. There are no regional wall motion abnormalities. The left ventricular cavity size is normal. Spectral Doppler shows a normal pattern of left ventricular diastolic filling. Left Atrium: The left atrium is upper limits of normal in size. Right Ventricle: The right ventricle is moderately enlarged. There is reduced right ventricular systolic function. Right Atrium: The right atrium was not well visualized. Aortic Valve: The aortic valve is probably trileaflet. There is no evidence of aortic valve regurgitation. The peak instantaneous gradient of the aortic valve is 12.1 mmHg. The mean gradient of the aortic valve is 4.2 mmHg. Mitral Valve: The mitral valve is normal in structure. There is no evidence of mitral valve regurgitation. Tricuspid Valve: The tricuspid valve was not well visualized. There is trace tricuspid regurgitation. The right ventricular systolic pressure is unable to be estimated. Pulmonic Valve: The pulmonic valve is not well visualized. There is physiologic pulmonic valve regurgitation. Pericardium: There is no pericardial effusion noted. Aorta: The aortic root was not well visualized. Systemic Veins: The inferior vena cava appears dilated. There is less than 50% IVC collapse with inspiration. In comparison to the previous echocardiogram(s): Compared with study from 6/29/2023, the imaging windows are more challenging on the current examination but the right ventricle appears larger and less dynamic.  CONCLUSIONS:  1. Poorly visualized anatomical structures due to suboptimal image quality.  2. Left ventricular systolic function is normal with a 60-65% estimated ejection fraction.  3. There is reduced right ventricular systolic function.  4. Moderately enlarged right ventricle. QUANTITATIVE  DATA SUMMARY: 2D MEASUREMENTS:                          Normal Ranges: IVSd:          0.71 cm   (0.6-1.1cm) LVPWd:         0.67 cm   (0.6-1.1cm) LVIDd:         4.70 cm   (3.9-5.9cm) LVIDs:         3.08 cm LV Mass Index: 47.5 g/m2 LV % FS        34.6 % LA VOLUME:                               Normal Ranges: LA Vol A4C:        76.9 ml    (22+/-6mL/m2) LA Vol A2C:        65.4 ml LA Vol BP:         73.4 ml LA Vol Index A4C:  36.1ml/m2 LA Vol Index A2C:  30.7 ml/m2 LA Vol Index BP:   34.5 ml/m2 LA Area A4C:       22.3 cm2 LA Area A2C:       21.3 cm2 LA Major Axis A4C: 5.5 cm LA Major Axis A2C: 5.9 cm RA VOLUME BY A/L METHOD:                               Normal Ranges: RA Vol A4C:        51.4 ml    (8.3-19.5ml) RA Vol Index A4C:  24.2 ml/m2 RA Area A4C:       17.9 cm2 RA Major Axis A4C: 5.3 cm M-MODE MEASUREMENTS:                  Normal Ranges: Ao Root: 2.90 cm (2.0-3.7cm) LAs:     3.38 cm (2.7-4.0cm) LV DIASTOLIC FUNCTION:                               Normal Ranges: MV Peak E:        0.78 m/s    (0.7-1.2 m/s) MV Peak A:        0.65 m/s    (0.42-0.7 m/s) E/A Ratio:        1.19        (1.0-2.2) MV e'             0.12 m/s    (>8.0) MV lateral e'     0.13 m/s MV medial e'      0.11 m/s MV A Dur:         156.04 msec E/e' Ratio:       6.51        (<8.0) PulmV Sys Jeremy:    34.04 cm/s PulmV Jose Jeremy:   30.46 cm/s PulmV S/D Jeremy:    1.12 PulmV A Revs Jeremy: 25.89 cm/s PulmV A Revs Dur: 117.98 msec MITRAL VALVE:                 Normal Ranges: MV DT: 304 msec (150-240msec) AORTIC VALVE:                                    Normal Ranges: AoV Vmax:                1.74 m/s  (<=1.7m/s) AoV Peak P.1 mmHg (<20mmHg) AoV Mean P.2 mmHg  (1.7-11.5mmHg) LVOT Max Jeremy:            1.46 m/s  (<=1.1m/s) AoV VTI:                 29.52 cm  (18-25cm) LVOT VTI:                30.61 cm LVOT Diameter:           2.08 cm   (1.8-2.4cm) AoV Area, VTI:           3.53 cm2  (2.5-5.5cm2) AoV Area,Vmax:           2.86 cm2   (2.5-4.5cm2) AoV Dimensionless Index: 1.04  RIGHT VENTRICLE: TAPSE: 27.0 mm RV s'  0.15 m/s TRICUSPID VALVE/RVSP:                   Normal Ranges: IVC Diam: 2.10 cm PULMONIC VALVE:                         Normal Ranges: PV Accel Time: 148 msec (>120ms) PV Max Jeremy:    0.7 m/s  (0.6-0.9m/s) PV Max P.8 mmHg Pulmonary Veins: PulmV A Revs Dur: 117.98 msec PulmV A Revs Jeremy: 25.89 cm/s PulmV Jose Jeremy:   30.46 cm/s PulmV S/D Jeremy:    1.12 PulmV Sys Jeremy:    34.04 cm/s  [id number] Ag Houston MD Electronically signed on 2024 at 7:41:34 PM  ** Final **         Assessment/Plan   Senait Narvaez is a 54 y.o. 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          [facility] Course  - VSS  -  Cr slightly elevated 1.13 (baseline 0.8-0.9),   - dilaudid 1 mg Q 1 hour x 3 doses per care plan given for sickle cell related pain  - Zofran 8mg once for opioid induced nausea/vomiting  - Benadryl 25mg once for opioid induced pruritus   - Covid/flu swab pending (reported N&V, cough, some SOB with activity SpO2 99%on RA)  - Hemoglobin identification, ionized calcium, type and screen and coag pending for upcoming exchange     Disposition  - direct admit for pain crisis accepted by Dr. Cr,  Sickle cell team updated on plan  for admission.             Marlene Harmon, GRISEL-CNP

## 2024-02-28 NOTE — H&P
History Of Present Illness  Senait Narvaez is a 54 y.o. female presenting with acute on chronic sickle cell.    Senait Narvaez is a 53 y.o. 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 Plan originally was to be admitted tomorrow 2/29 for line placement for planned routine RBC exchange 3/1. We discussed lysis labs that are around her baseline, reviewed yesterdays CXR results, and current plan for pain mgmt. Pt reports pain this morning was 10/10 and after receiving pain medicine, it went down to 8/10 in [facility]. She states the pain has been getting progressively worse over a couple of weeks but that it really went higher this morning.     Denies nausea, vomiting, fever, chills, being near sick contacts, SOB, constipation, diarrhea, and dizziness.      Past Medical History  She has a past medical history of Asthma, CHF (congestive heart failure) (CMS/[facility]), Chronic pain disorder, Compression of vein (02/19/2020), and Hypotension, unspecified (12/10/2020).      Surgical History  She has a past surgical history that includes Appendectomy (08/05/2013); Cholecystectomy (08/05/2013); Other surgical history (05/13/2014); Other surgical history (10/09/2014); Other surgical history (06/09/2014); MR angio head wo IV contrast (8/16/2014); MR angio neck wo IV contrast (8/16/2014); IR CVC tunneled (3/13/2015); IR CVC tunneled (1/29/2016); IR CVC tunneled (1/17/2018); IR CVC tunneled (2/22/2018); IR CVC tunneled (3/22/2018); IR CVC tunneled (4/18/2018); IR CVC tunneled (5/16/2018); IR CVC tunneled (6/12/2018); US guided biopsy lymph node superficial (9/17/2019); CT guided percutaneous biopsy LYMPH node superficial (9/19/2019); IR CVC tunneled (1/21/2020); IR CVC  tunneled (2/28/2020); IR CVC tunneled (4/10/2020); IR CVC tunneled (5/22/2020); IR CVC tunneled (6/19/2020); IR CVC tunneled (7/23/2020); IR CVC tunneled (9/4/2020); IR CVC tunneled (10/9/2020); IR CVC tunneled (11/13/2020); IR CVC tunneled (12/18/2020); IR CVC tunneled (1/22/2021); IR CVC tunneled (2/26/2021); IR CVC tunneled (4/2/2021); IR CVC tunneled (5/7/2021); IR CVC tunneled (6/11/2021); IR CVC tunneled (7/16/2021); IR CVC tunneled (8/20/2021); IR CVC tunneled (9/24/2021); IR CVC tunneled (10/29/2021); IR CVC tunneled (12/3/2021); IR CVC tunneled (1/7/2022); IR CVC tunneled (2/23/2022); IR CVC tunneled (3/25/2022); IR CVC tunneled (4/22/2022); IR CVC tunneled (6/3/2022); IR CVC tunneled (9/18/2017); IR CVC tunneled (10/30/2017); IR CVC tunneled (12/19/2017); IR CVC tunneled (1/6/2023); IR CVC tunneled (2/24/2023); IR CVC tunneled (7/8/2022); IR CVC tunneled (8/12/2022); IR CVC tunneled (9/16/2022); CT angio neck (10/19/2022); IR CVC tunneled (10/20/2022); IR CVC tunneled (11/29/2022); IR CVC tunneled (3/31/2023); IR CVC tunneled (6/9/2023); IR CVC tunneled (7/20/2023); IR CVC tunneled (8/16/2023); IR CVC tunneled (9/21/2023); IR CVC nontunneled (10/26/2023); and IR CVC nontunneled (12/7/2023).    Oncology History    No history exists.        Social History  She reports that she quit smoking about 10 years ago. Her smoking use included cigarettes. She has been exposed to tobacco smoke. She has never used smokeless tobacco. She reports that she does not currently use alcohol. She reports that she does not currently use drugs.     Allergies  Patient has no known allergies.    Review of Systems   Constitutional: Negative.    HENT:  Positive for facial swelling.    Respiratory:  Positive for shortness of breath.    Cardiovascular: Negative.    Gastrointestinal: Negative.    Genitourinary: Negative.    Musculoskeletal:  Positive for back pain.   Skin: Negative.    Neurological: Negative.    Psychiatric/Behavioral:  Negative.          Physical Exam  Constitutional:       Appearance: She is obese.   HENT:      Head: Normocephalic.      Right Ear: External ear normal.      Left Ear: External ear normal.      Nose: Nose normal.   Eyes:      Extraocular Movements: Extraocular movements intact.      Conjunctiva/sclera: Conjunctivae normal.      Pupils: Pupils are equal, round, and reactive to light.   Pulmonary:      Effort: Pulmonary effort is normal.      Comments: Diminished throughout all lung fields  Abdominal:      General: Bowel sounds are normal.      Palpations: Abdomen is soft.   Musculoskeletal:         General: Normal range of motion.      Cervical back: Normal range of motion and neck supple.   Skin:     General: Skin is warm and dry.   Neurological:      General: No focal deficit present.      Mental Status: She is alert and oriented to person, place, and time.   Psychiatric:         Mood and Affect: Mood normal.         Behavior: Behavior normal.         Thought Content: Thought content normal.          Last Recorded Vitals  There were no vitals taken for this visit.    Relevant Results    .Scheduled medications  DULoxetine, 40 mg, oral, Daily  folic acid, 1 mg, oral, Daily  metoprolol tartrate, 12.5 mg, oral, BID  multivitamin with minerals, 1 tablet, oral, Daily  sennosides-docusate sodium, 2 tablet, oral, q12h  warfarin, 5 mg, oral, Daily      Continuous medications  oxygen,       PRN medications  PRN medications: albuterol, fluticasone, loratadine, ondansetron    .  Results for orders placed or performed in visit on 02/28/24 (from the past 24 hour(s))   Reticulocytes   Result Value Ref Range    Retic % 0.5 0.5 - 2.0 %    Retic Absolute 0.020 0.018 - 0.083 x10*6/uL    Reticulocyte Hemoglobin 21 (L) 28 - 38 pg    Immature Retic fraction 53.6 (H) <=16.0 %   Lactate Dehydrogenase   Result Value Ref Range     84 - 246 U/L   Comprehensive Metabolic Panel   Result Value Ref Range    Glucose 95 74 - 99 mg/dL     Sodium 138 136 - 145 mmol/L    Potassium 3.9 3.5 - 5.3 mmol/L    Chloride 103 98 - 107 mmol/L    Bicarbonate 29 21 - 32 mmol/L    Anion Gap 10 10 - 20 mmol/L    Urea Nitrogen 10 6 - 23 mg/dL    Creatinine 1.13 (H) 0.50 - 1.05 mg/dL    eGFR 58 (L) >60 mL/min/1.73m*2    Calcium 9.2 8.6 - 10.3 mg/dL    Albumin 3.7 3.4 - 5.0 g/dL    Alkaline Phosphatase 104 33 - 110 U/L    Total Protein 7.1 6.4 - 8.2 g/dL    AST 13 9 - 39 U/L    Bilirubin, Total 1.1 0.0 - 1.2 mg/dL    ALT 5 (L) 7 - 45 U/L   CBC and Auto Differential   Result Value Ref Range    WBC 10.4 4.4 - 11.3 x10*3/uL    nRBC 8.7 (H) 0.0 - 0.0 /100 WBCs    RBC 4.34 4.00 - 5.20 x10*6/uL    Hemoglobin 10.0 (L) 12.0 - 16.0 g/dL    Hematocrit 31.2 (L) 36.0 - 46.0 %    MCV 72 (L) 80 - 100 fL    MCH 23.0 (L) 26.0 - 34.0 pg    MCHC 32.1 32.0 - 36.0 g/dL    RDW 29.1 (H) 11.5 - 14.5 %    Platelets 350 150 - 450 x10*3/uL    Neutrophils % 73.4 40.0 - 80.0 %    Immature Granulocytes %, Automated 0.4 0.0 - 0.9 %    Lymphocytes % 15.5 13.0 - 44.0 %    Monocytes % 8.5 2.0 - 10.0 %    Eosinophils % 2.1 0.0 - 6.0 %    Basophils % 0.1 0.0 - 2.0 %    Neutrophils Absolute 7.61 1.20 - 7.70 x10*3/uL    Immature Granulocytes Absolute, Automated 0.04 0.00 - 0.70 x10*3/uL    Lymphocytes Absolute 1.61 1.20 - 4.80 x10*3/uL    Monocytes Absolute 0.88 0.10 - 1.00 x10*3/uL    Eosinophils Absolute 0.22 0.00 - 0.70 x10*3/uL    Basophils Absolute 0.01 0.00 - 0.10 x10*3/uL   Morphology   Result Value Ref Range    RBC Morphology See Below     Polychromasia Mild     Hypochromia Mild     RBC Fragments Few     Sickle Cells Few     Target Cells Many     Sandra Cells Few     Rhodes-Crest Bodies Present    Hemoglobin Identification with Path Review   Result Value Ref Range    Hemoglobin A      Hemoglobin F      Hemoglobin S 27.9 (H) <=0.0 %    Hemoglobin C 26.7 (H) <=0.0 %    Hemoglobin A2      Hemoglobin Identification Interpretation      Pathologist Review-Hemoglobin Identification     Calcium, ionized    Result Value Ref Range    POCT Calcium, Ionized 1.07 (L) 1.1 - 1.33 mmol/L   Coagulation Screen   Result Value Ref Range    Protime 19.3 (H) 9.8 - 12.8 seconds    INR 1.7 (H) 0.9 - 1.1    aPTT 31 27 - 38 seconds          Assessment/Plan   Principal Problem:    Sickle cell disease with crisis and other complication (CMS/HCC)  Active Problems:    Sickle cell anemia without crisis (CMS/[facility])    Senait Narvaez is a 53 y.o. 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 Plan originally was to be admitted tomorrow 2/29 for line placement for planned routine RBC exchange 3/1. Lysis labs at baseline. 2/27 CXR w/o evidence of acute process. Admitted for further pain mgmt. Started on IV dilaudid for pain mgmt per carepath.      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  - Managed through routine RBC exchanges q6 weeks, most recent RBCEx 1/20/24   - OARRS reviewed, no aberrant behavior noted (Last refilled oxy 10mg tab qty 75, x12 days on 2/12)   - No leukocytosis on admit, afebrile, no s/s infectious on admit   - Hemolysis labs at baseline, Hg baseline ~8-11; Hg 10 on admit  - 2/27 CXR w/o evidence of acute process, atelectasis vs scarring   - Care plan from 12/6/23- For mild to moderate pain: Dilaudid 0.5mg IVP q3h as needed, for moderate to severe pain Dilaudid 1- 1.5mg q3h PRN  - For pain management - started on IV dilaudid 1 mg q3 PRN on admit   - bowel regimen for opioid induced constipation, zofran for opioid induced nausea, and benadrly for opioid induced pruritus; [facility] 2/28  - c/w home Duloxetine 40mg daily, PO Zofran PRN, Folic Acid 1mg daily, and MVI daily  - Plan for apheresis line placement 2/29 and routine RBCEx 3/1   - Hg S 27.9%, C 26.7%, other pre-exchange  labs obtained in [facility]     # pHTN   - 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 Yi and Dr. 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 (PFT to reevaluate for obstruction and small airway disease), and sleep apnea    - c/w home 2 L via CPAP at night   - Follows with pulmonology outpt     # Hx SVT  - Baseline admission EKG pending on admit   - ECHO (6/29/23) EF 60-65%  - c/w home Metoprolol Succs 25mg daily and 20mg lasix every other day   - Follows with Cardiology outpt, 2/13 most recent OP visit, tolerating all meds including lasix 20 mg every other day, metop tartrate 12.5mg BID to control SVT  - Telemetry ordered for 3/1 once exchange started      # DVT/ PE/ SVC Thrombus  - Hx SVC stricture s/p SVC angioplasty  - B/l Upper Extremity and Facial Swelling d/t partial filling defect within the SVC and a thrombus of the SVC complicated by bilateral Mediport catheters. On lifelong anticoagulation, DOAC discouraged due to high risk of clotting   - Continue home Coumadin 5mg daily, held on admit for line placement tomorrow in IR 2/29   - INR 1.7 (2/28)   - Monitors INR with at home Coagcheck device  - Follows with Coumadin clinic outpatient  - Caution with fluids      # CKD II  - Baseline ~ SCr <2. Cr 1.13 on admission  - Thought to be related to hypotension mediated by metoprolol (hx of SVT) and furosemide (hx of heart failure)  - Follows with nephrology outpt - most recent appointment 1/11/24, check labs q6 mo, FUV in 1yr Nephrology      # CAN  - Continue home CPAP   - Continue home Albuterol PRN     # H/O Cauda Equine syndrome  - Follows   Jayme as outpatient    # dispo  - Full code  - DC home resumed O2 pending RBCex and improvement in pain  - FUV 4/15 Rheum, 7/15 Cards    I spent >60 minutes in the professional and overall care of this patient.    Assessment and plan discussed with attending physician Dr. Cr.       Marisa Brizuela, APRN-CNP

## 2024-02-28 NOTE — CARE PLAN
The patient's goals for the shift include      The clinical goals for the shift include        Problem: Pain  Goal: My pain/discomfort is manageable  2/28/2024 1505 by Marlene Juares RN  Outcome: Progressing  2/28/2024 1504 by Marlene Juares RN  Outcome: Progressing     Problem: Safety  Goal: Patient will be injury free during hospitalization  2/28/2024 1505 by Marlene Juares RN  Outcome: Progressing  2/28/2024 1504 by Marlene Juares RN  Outcome: Progressing  Goal: I will remain free of falls  2/28/2024 1505 by Marlene Juares RN  Outcome: Progressing  2/28/2024 1504 by Marlene Juares RN  Outcome: Progressing     Problem: Daily Care  Goal: Daily care needs are met  2/28/2024 1505 by Marlene Juares RN  Outcome: Progressing  2/28/2024 1504 by Marlene Juares RN  Outcome: Progressing     Problem: Psychosocial Needs  Goal: Demonstrates ability to cope with hospitalization/illness  2/28/2024 1505 by Marlene Juares RN  Outcome: Progressing  2/28/2024 1504 by Marlene Juares RN  Outcome: Progressing  Goal: Collaborate with me, my family, and caregiver to identify my specific goals  2/28/2024 1505 by Marlene Juares RN  Outcome: Progressing  2/28/2024 1504 by Marlene Juares RN  Outcome: Progressing     Problem: Discharge Barriers  Goal: My discharge needs are met  2/28/2024 1505 by Marlene Juares RN  Outcome: Progressing  2/28/2024 1504 by Marlene Juares RN  Outcome: Progressing     Problem: Pain  Goal: Takes deep breaths with improved pain control throughout the shift  2/28/2024 1505 by Marlene Juares RN  Outcome: Progressing  2/28/2024 1504 by Marlene Juares RN  Outcome: Progressing  Goal: Turns in bed with improved pain control throughout the shift  2/28/2024 1505 by Marlene Juares RN  Outcome: Progressing  2/28/2024 1504 by Marlene Juares RN  Outcome: Progressing  Goal: Walks with improved pain control throughout the shift  2/28/2024 1505 by Marlene Juares,  RN  Outcome: Progressing  2/28/2024 1504 by Marlene Juares RN  Outcome: Progressing  Goal: Performs ADL's with improved pain control throughout shift  2/28/2024 1505 by Marlene Juares RN  Outcome: Progressing  2/28/2024 1504 by Marlene Juares RN  Outcome: Progressing  Goal: Participates in PT with improved pain control throughout the shift  2/28/2024 1505 by Marlene Juares RN  Outcome: Progressing  2/28/2024 1504 by Marlene Juares RN  Outcome: Progressing  Goal: Free from opioid side effects throughout the shift  2/28/2024 1505 by Marlene Juares RN  Outcome: Progressing  2/28/2024 1504 by Marlene Juares RN  Outcome: Progressing  Goal: Free from acute confusion related to pain meds throughout the shift  2/28/2024 1505 by Marlene Juares RN  Outcome: Progressing  2/28/2024 1504 by Marlene Juares RN  Outcome: Progressing

## 2024-02-28 NOTE — PROGRESS NOTES
Pharmacy Medication History Review    Senait Narvaez is a 54 y.o. female admitted for Sickle cell disease with crisis and other complication (CMS/[facility]). Pharmacy reviewed the patient's [id number] medications and allergies for accuracy.    The list below reflects the updated PTA list. Comments regarding how patient may be taking medications differently can be found in the Admit Orders Activity  Prior to Admission Medications   Prescriptions Last Dose Informant Patient Reported?   DULoxetine (Cymbalta) 20 mg DR capsule  Self, Other No   Sig: Take 2 capsules (40 mg) by mouth once daily. Do not crush or chew.   Patient taking differently: Take 2 capsules (40 mg) by mouth once daily at bedtime. Do not crush or chew.   albuterol 90 mcg/actuation inhaler  Self No   Sig: Inhale 2 puffs every 6 hours if needed for wheezing.   ascorbic acid (Vitamin C) 500 mg chewable tablet  Self Yes   Sig: Chew 1 tablet (500 mg) once daily. As needed   elderberry fruit 350 mg capsule  Self Yes   Sig: Take 1 capsule by mouth once daily.   ergocalciferol (Vitamin D-2) 1.25 MG (87259 UT) capsule  Self No   Sig: Take 1 capsule (50,000 Units) by mouth 1 (one) time per week.   fluticasone (Flonase) 50 mcg/actuation nasal spray  Self Yes   Sig: Administer 2 sprays into each nostril if needed.   folic acid (Folvite) 1 mg tablet  Self Yes   Sig: Take 1 tablet (1 mg) by mouth once daily.   furosemide (Lasix) 20 mg tablet  Self Yes   Sig: Take 1 tablet (20 mg) by mouth every other day.   loratadine (Claritin) 10 mg tablet  Self Yes   Sig: Take 1 tablet (10 mg) by mouth once daily as needed for allergies.   metoprolol tartrate (Lopressor) 25 mg tablet  Self No   Sig: Take 0.5 tablets (12.5 mg) by mouth 2 times a day.   multivitamin with minerals tablet  Self Yes   Sig: Take 1 tablet by mouth once daily.   naloxone (Narcan) 4 mg/0.1 mL nasal spray  Self No   Sig: Administer 1 spray (4 mg) into affected nostril(s) if needed for opioid reversal. May  repeat every 2-3 minutes if needed, alternating nostrils, until medical assistance becomes available.   ondansetron (Zofran) 4 mg tablet  Self No   Sig: Take 1 tablet (4 mg) by mouth every 8 hours if needed for nausea or vomiting.   oxyCODONE (Roxicodone) 10 mg immediate release tablet  Self No   Sig: Take 1 tablet (10 mg) by mouth every 4 hours if needed for severe pain (7 - 10) (pain).   oxygen (O2) gas therapy  Self No   Sig: Inhale 1 each continuously.   oxygen (O2) gas therapy  Self No   Sig: Inhale 1 each continuously.   sennosides-docusate sodium (Nita-Colace) 8.6-50 mg tablet  Self Yes   Sig: Take 2 tablets by mouth every 12 hours.   traZODone (Desyrel) 50 mg tablet  Self No   Sig: Take 1 tablet (50 mg) by mouth as needed at bedtime for sleep. 1/2 - 1 tabs for insomnia   warfarin (Coumadin) 5 mg tablet  Self, Other Yes   Sig: Take 1 tablet (5 mg) by mouth once daily in the evening.      Facility-Administered Medications: None        The list below reflects the updated allergy list. Please review each documented allergy for additional clarification and justification.  Allergies  Reviewed by [place]oacano Self MA on 2/27/2024   No Known Allergies         Patient accepts M2B at discharge. Pharmacy has been updated to [facility].    Sources used to complete the med history include out patient fill history, OARRS, and patient interview- moderate historian.      Below are additional concerns with the patient's PTA list.  Patient reports taking Cymbalta differently: 1 tab every day  Patient reports taking Metoprolol tart differently: 0.5 tab every day   Patient reports not taking Vitamin D    Darius Little PharmD  Transitions of Care Pharmacist  [facility]  Please reach out via Secure Chat for questions, or if no response call [employer] or [employer]

## 2024-02-29 ENCOUNTER — APPOINTMENT (OUTPATIENT)
Dept: RADIOLOGY | Facility: HOSPITAL | Age: 54
DRG: 812 | End: 2024-02-29
Payer: COMMERCIAL

## 2024-02-29 ENCOUNTER — TELEPHONE (OUTPATIENT)
Dept: PRIMARY CARE | Facility: CLINIC | Age: 54
End: 2024-02-29

## 2024-02-29 LAB
ALBUMIN SERPL BCP-MCNC: 3.7 G/DL (ref 3.4–5)
ALP SERPL-CCNC: 107 U/L (ref 33–110)
ALT SERPL W P-5'-P-CCNC: 7 U/L (ref 7–45)
ANION GAP SERPL CALC-SCNC: 10 MMOL/L (ref 10–20)
AST SERPL W P-5'-P-CCNC: 11 U/L (ref 9–39)
BASOPHILS # BLD MANUAL: 0 X10*3/UL (ref 0–0.1)
BASOPHILS NFR BLD MANUAL: 0 %
BILIRUB SERPL-MCNC: 1 MG/DL (ref 0–1.2)
BUN SERPL-MCNC: 21 MG/DL (ref 6–23)
CALCIUM SERPL-MCNC: 8.9 MG/DL (ref 8.6–10.6)
CHLORIDE SERPL-SCNC: 100 MMOL/L (ref 98–107)
CO2 SERPL-SCNC: 31 MMOL/L (ref 21–32)
CREAT SERPL-MCNC: 1.72 MG/DL (ref 0.5–1.05)
EGFRCR SERPLBLD CKD-EPI 2021: 35 ML/MIN/1.73M*2
EOSINOPHIL # BLD MANUAL: 0.15 X10*3/UL (ref 0–0.7)
EOSINOPHIL NFR BLD MANUAL: 1.7 %
ERYTHROCYTE [DISTWIDTH] IN BLOOD BY AUTOMATED COUNT: 29.2 % (ref 11.5–14.5)
GLUCOSE SERPL-MCNC: 90 MG/DL (ref 74–99)
HCT VFR BLD AUTO: 28.5 % (ref 36–46)
HEMOGLOBIN A2: 3.2 % (ref 2–3.5)
HEMOGLOBIN A: 42 % (ref 95.8–98)
HEMOGLOBIN C: 26.7 %
HEMOGLOBIN F: 0.2 % (ref 0–2)
HEMOGLOBIN IDENTIFICATION INTERPRETATION: ABNORMAL
HEMOGLOBIN S: 27.9 %
HGB BLD-MCNC: 9.1 G/DL (ref 12–16)
HGB RETIC QN: 22 PG (ref 28–38)
HYPOCHROMIA BLD QL SMEAR: NORMAL
IMM GRANULOCYTES # BLD AUTO: 0.03 X10*3/UL (ref 0–0.7)
IMM GRANULOCYTES NFR BLD AUTO: 0.3 % (ref 0–0.9)
IMMATURE RETIC FRACTION: 53.1 %
LDH SERPL L TO P-CCNC: 171 U/L (ref 84–246)
LYMPHOCYTES # BLD MANUAL: 1.32 X10*3/UL (ref 1.2–4.8)
LYMPHOCYTES NFR BLD MANUAL: 14.7 %
MCH RBC QN AUTO: 23.2 PG (ref 26–34)
MCHC RBC AUTO-ENTMCNC: 31.9 G/DL (ref 32–36)
MCV RBC AUTO: 73 FL (ref 80–100)
MONOCYTES # BLD MANUAL: 0.54 X10*3/UL (ref 0.1–1)
MONOCYTES NFR BLD MANUAL: 6 %
NEUTS SEG # BLD MANUAL: 6.98 X10*3/UL (ref 1.2–7)
NEUTS SEG NFR BLD MANUAL: 77.6 %
NRBC BLD-RTO: 0.4 /100 WBCS (ref 0–0)
PATH REVIEW-HGB IDENTIFICATION: ABNORMAL
PLATELET # BLD AUTO: 328 X10*3/UL (ref 150–450)
POLYCHROMASIA BLD QL SMEAR: NORMAL
POTASSIUM SERPL-SCNC: 4.6 MMOL/L (ref 3.5–5.3)
PROT SERPL-MCNC: 6.9 G/DL (ref 6.4–8.2)
RBC # BLD AUTO: 3.93 X10*6/UL (ref 4–5.2)
RBC MORPH BLD: NORMAL
RETICS #: 0.01 X10*6/UL (ref 0.02–0.08)
RETICS/RBC NFR AUTO: 0.4 % (ref 0.5–2)
SODIUM SERPL-SCNC: 136 MMOL/L (ref 136–145)
TARGETS BLD QL SMEAR: NORMAL
TOTAL CELLS COUNTED BLD: 116
WBC # BLD AUTO: 9 X10*3/UL (ref 4.4–11.3)

## 2024-02-29 PROCEDURE — 2780000003 HC OR 278 NO HCPCS

## 2024-02-29 PROCEDURE — 85007 BL SMEAR W/DIFF WBC COUNT: CPT

## 2024-02-29 PROCEDURE — 36556 INSERT NON-TUNNEL CV CATH: CPT | Performed by: STUDENT IN AN ORGANIZED HEALTH CARE EDUCATION/TRAINING PROGRAM

## 2024-02-29 PROCEDURE — 1200000002 HC GENERAL ROOM WITH TELEMETRY DAILY

## 2024-02-29 PROCEDURE — 2500000004 HC RX 250 GENERAL PHARMACY W/ HCPCS (ALT 636 FOR OP/ED)

## 2024-02-29 PROCEDURE — 94660 CPAP INITIATION&MGMT: CPT | Mod: MUE

## 2024-02-29 PROCEDURE — 36415 COLL VENOUS BLD VENIPUNCTURE: CPT

## 2024-02-29 PROCEDURE — 85045 AUTOMATED RETICULOCYTE COUNT: CPT

## 2024-02-29 PROCEDURE — 2500000001 HC RX 250 WO HCPCS SELF ADMINISTERED DRUGS (ALT 637 FOR MEDICARE OP)

## 2024-02-29 PROCEDURE — 80053 COMPREHEN METABOLIC PANEL: CPT

## 2024-02-29 PROCEDURE — 99232 SBSQ HOSP IP/OBS MODERATE 35: CPT | Performed by: INTERNAL MEDICINE

## 2024-02-29 PROCEDURE — 85027 COMPLETE CBC AUTOMATED: CPT

## 2024-02-29 PROCEDURE — 76937 US GUIDE VASCULAR ACCESS: CPT | Performed by: STUDENT IN AN ORGANIZED HEALTH CARE EDUCATION/TRAINING PROGRAM

## 2024-02-29 PROCEDURE — C1752 CATH,HEMODIALYSIS,SHORT-TERM: HCPCS

## 2024-02-29 PROCEDURE — 77001 FLUOROGUIDE FOR VEIN DEVICE: CPT | Performed by: STUDENT IN AN ORGANIZED HEALTH CARE EDUCATION/TRAINING PROGRAM

## 2024-02-29 PROCEDURE — 2500000002 HC RX 250 W HCPCS SELF ADMINISTERED DRUGS (ALT 637 FOR MEDICARE OP, ALT 636 FOR OP/ED): Mod: MUE

## 2024-02-29 PROCEDURE — 83615 LACTATE (LD) (LDH) ENZYME: CPT

## 2024-02-29 PROCEDURE — 06H033Z INSERTION OF INFUSION DEVICE INTO INFERIOR VENA CAVA, PERCUTANEOUS APPROACH: ICD-10-PCS | Performed by: STUDENT IN AN ORGANIZED HEALTH CARE EDUCATION/TRAINING PROGRAM

## 2024-02-29 RX ADMIN — SODIUM CHLORIDE, POTASSIUM CHLORIDE, SODIUM LACTATE AND CALCIUM CHLORIDE 250 ML: 600; 310; 30; 20 INJECTION, SOLUTION INTRAVENOUS at 12:14

## 2024-02-29 RX ADMIN — FUROSEMIDE 20 MG: 20 TABLET ORAL at 09:24

## 2024-02-29 RX ADMIN — HYDROMORPHONE HYDROCHLORIDE 1 MG: 1 INJECTION, SOLUTION INTRAMUSCULAR; INTRAVENOUS; SUBCUTANEOUS at 15:40

## 2024-02-29 RX ADMIN — WARFARIN SODIUM 5 MG: 5 TABLET ORAL at 18:13

## 2024-02-29 RX ADMIN — SENNOSIDES AND DOCUSATE SODIUM 2 TABLET: 8.6; 5 TABLET ORAL at 20:11

## 2024-02-29 RX ADMIN — SENNOSIDES AND DOCUSATE SODIUM 2 TABLET: 8.6; 5 TABLET ORAL at 09:19

## 2024-02-29 RX ADMIN — HYDROMORPHONE HYDROCHLORIDE 1 MG: 1 INJECTION, SOLUTION INTRAMUSCULAR; INTRAVENOUS; SUBCUTANEOUS at 05:42

## 2024-02-29 RX ADMIN — Medication 1 TABLET: at 07:00

## 2024-02-29 RX ADMIN — HYDROMORPHONE HYDROCHLORIDE 1 MG: 1 INJECTION, SOLUTION INTRAMUSCULAR; INTRAVENOUS; SUBCUTANEOUS at 09:10

## 2024-02-29 RX ADMIN — HYDROMORPHONE HYDROCHLORIDE 1 MG: 1 INJECTION, SOLUTION INTRAMUSCULAR; INTRAVENOUS; SUBCUTANEOUS at 23:38

## 2024-02-29 RX ADMIN — HYDROMORPHONE HYDROCHLORIDE 1 MG: 1 INJECTION, SOLUTION INTRAMUSCULAR; INTRAVENOUS; SUBCUTANEOUS at 20:11

## 2024-02-29 RX ADMIN — DULOXETINE 40 MG: 20 CAPSULE, DELAYED RELEASE ORAL at 09:19

## 2024-02-29 RX ADMIN — METOPROLOL TARTRATE 12.5 MG: 25 TABLET, FILM COATED ORAL at 09:24

## 2024-02-29 RX ADMIN — FOLIC ACID 1 MG: 1 TABLET ORAL at 09:19

## 2024-02-29 RX ADMIN — METOPROLOL TARTRATE 12.5 MG: 25 TABLET, FILM COATED ORAL at 20:11

## 2024-02-29 RX ADMIN — HYDROMORPHONE HYDROCHLORIDE 1 MG: 1 INJECTION, SOLUTION INTRAMUSCULAR; INTRAVENOUS; SUBCUTANEOUS at 02:00

## 2024-02-29 RX ADMIN — HYDROMORPHONE HYDROCHLORIDE 1 MG: 1 INJECTION, SOLUTION INTRAMUSCULAR; INTRAVENOUS; SUBCUTANEOUS at 12:13

## 2024-02-29 ASSESSMENT — COGNITIVE AND FUNCTIONAL STATUS - GENERAL
MOBILITY SCORE: 24
MOBILITY SCORE: 24
DAILY ACTIVITIY SCORE: 24

## 2024-02-29 ASSESSMENT — PAIN SCALES - GENERAL
PAINLEVEL_OUTOF10: 0 - NO PAIN
PAINLEVEL_OUTOF10: 9
PAINLEVEL_OUTOF10: 0 - NO PAIN
PAINLEVEL_OUTOF10: 9
PAINLEVEL_OUTOF10: 9
PAINLEVEL_OUTOF10: 0 - NO PAIN
PAINLEVEL_OUTOF10: 8
PAINLEVEL_OUTOF10: 9
PAINLEVEL_OUTOF10: 7
PAINLEVEL_OUTOF10: 8
PAINLEVEL_OUTOF10: 0 - NO PAIN
PAINLEVEL_OUTOF10: 9

## 2024-02-29 ASSESSMENT — PAIN - FUNCTIONAL ASSESSMENT
PAIN_FUNCTIONAL_ASSESSMENT: 0-10

## 2024-02-29 NOTE — CARE PLAN
The patient's goals for the shift include      The clinical goals for the shift include Pt will have decreased pain through end of shift 2/29/2024 1900      Problem: Pain  Goal: My pain/discomfort is manageable  Outcome: Progressing     Problem: Safety  Goal: Patient will be injury free during hospitalization  Outcome: Progressing  Goal: I will remain free of falls  Outcome: Progressing     Problem: Daily Care  Goal: Daily care needs are met  Outcome: Progressing     Problem: Psychosocial Needs  Goal: Demonstrates ability to cope with hospitalization/illness  Outcome: Progressing  Goal: Collaborate with me, my family, and caregiver to identify my specific goals  Outcome: Progressing     Problem: Discharge Barriers  Goal: My discharge needs are met  Outcome: Progressing     Problem: Pain  Goal: Takes deep breaths with improved pain control throughout the shift  Outcome: Progressing  Goal: Turns in bed with improved pain control throughout the shift  Outcome: Progressing  Goal: Walks with improved pain control throughout the shift  Outcome: Progressing  Goal: Performs ADL's with improved pain control throughout shift  Outcome: Progressing  Goal: Participates in PT with improved pain control throughout the shift  Outcome: Progressing  Goal: Free from opioid side effects throughout the shift  Outcome: Progressing  Goal: Free from acute confusion related to pain meds throughout the shift  Outcome: Progressing

## 2024-02-29 NOTE — PROGRESS NOTES
"Senait Narvaez is a 54 y.o. female on day 1 of admission presenting with Sickle cell disease with crisis and other complication (CMS/HCC).    Subjective   Pt seen at bedside this AM. Discussed plan for line from IR today, pt agreeable. Reports pain was \"all over\" on/off overnight, says it feels like her acute crises that occur around this time but overall current regimen is working.    Denies nausea, vomiting, fever, chills, chest pain, SOB, abd pain, constipation, diarrhea, bleeding, and headaches.        Objective     Physical Exam  Constitutional:       Appearance: She is obese.   HENT:      Head: Normocephalic.      Right Ear: External ear normal.      Left Ear: External ear normal.      Nose: Nose normal.   Eyes:      Extraocular Movements: Extraocular movements intact.      Conjunctiva/sclera: Conjunctivae normal.      Pupils: Pupils are equal, round, and reactive to light.   Cardiovascular:      Rate and Rhythm: Normal rate and regular rhythm.   Pulmonary:      Comments: Diminished throughout all lung fields   Abdominal:      Palpations: Abdomen is soft.   Musculoskeletal:         General: Normal range of motion.      Cervical back: Normal range of motion and neck supple.   Skin:     General: Skin is warm and dry.   Neurological:      General: No focal deficit present.      Mental Status: She is alert and oriented to person, place, and time. Mental status is at baseline.   Psychiatric:         Mood and Affect: Mood normal.         Behavior: Behavior normal.         Thought Content: Thought content normal.         Last Recorded Vitals  Blood pressure 100/68, pulse 67, temperature 36.2 °C (97.2 °F), temperature source Temporal, resp. rate 18, height 1.651 m (5' 5\"), weight 109 kg (240 lb 4.8 oz), SpO2 93 %.  Intake/Output last 3 Shifts:  No intake/output data recorded.    Relevant Results     .Scheduled medications  DULoxetine, 40 mg, oral, Daily  folic acid, 1 mg, oral, Daily  furosemide, 20 mg, oral, Every other " day  metoprolol tartrate, 12.5 mg, oral, BID  multivitamin with minerals, 1 tablet, oral, Daily  polyethylene glycol, 17 g, oral, Daily  sennosides-docusate sodium, 2 tablet, oral, BID  [Held by provider] warfarin, 5 mg, oral, Daily      Continuous medications  oxygen, , Last Rate: 4 L/min (02/28/24 1724)      PRN medications  PRN medications: albuterol, fluticasone, HYDROmorphone, loratadine, ondansetron    .  Results for orders placed or performed during the hospital encounter of 02/28/24 (from the past 24 hour(s))   CBC and Auto Differential   Result Value Ref Range    WBC 13.3 (H) 4.4 - 11.3 x10*3/uL    nRBC 6.2 (H) 0.0 - 0.0 /100 WBCs    RBC 4.20 4.00 - 5.20 x10*6/uL    Hemoglobin 9.6 (L) 12.0 - 16.0 g/dL    Hematocrit 30.6 (L) 36.0 - 46.0 %    MCV 73 (L) 80 - 100 fL    MCH 22.9 (L) 26.0 - 34.0 pg    MCHC 31.4 (L) 32.0 - 36.0 g/dL    RDW 29.2 (H) 11.5 - 14.5 %    Platelets 320 150 - 450 x10*3/uL    Immature Granulocytes %, Automated 0.4 0.0 - 0.9 %    Immature Granulocytes Absolute, Automated 0.05 0.00 - 0.70 x10*3/uL   Comprehensive Metabolic Panel   Result Value Ref Range    Glucose 108 (H) 74 - 99 mg/dL    Sodium 136 136 - 145 mmol/L    Potassium 5.4 (H) 3.5 - 5.3 mmol/L    Chloride 100 98 - 107 mmol/L    Bicarbonate 27 21 - 32 mmol/L    Anion Gap 14 10 - 20 mmol/L    Urea Nitrogen 13 6 - 23 mg/dL    Creatinine 1.44 (H) 0.50 - 1.05 mg/dL    eGFR 43 (L) >60 mL/min/1.73m*2    Calcium 8.7 8.6 - 10.6 mg/dL    Albumin 3.9 3.4 - 5.0 g/dL    Alkaline Phosphatase 104 33 - 110 U/L    Total Protein 7.0 6.4 - 8.2 g/dL    AST 31 9 - 39 U/L    Bilirubin, Total 1.2 0.0 - 1.2 mg/dL    ALT 8 7 - 45 U/L   Lactate Dehydrogenase   Result Value Ref Range     (H) 84 - 246 U/L   Reticulocytes   Result Value Ref Range    Retic % 0.5 0.5 - 2.0 %    Retic Absolute 0.020 0.018 - 0.083 x10*6/uL    Reticulocyte Hemoglobin 21 (L) 28 - 38 pg    Immature Retic fraction 51.3 (H) <=16.0 %   Manual Differential   Result Value Ref  Range    Neutrophils %, Manual 89.6 40.0 - 80.0 %    Lymphocytes %, Manual 5.2 13.0 - 44.0 %    Monocytes %, Manual 2.6 2.0 - 10.0 %    Eosinophils %, Manual 0.9 0.0 - 6.0 %    Basophils %, Manual 0.0 0.0 - 2.0 %    Myelocytes %, Manual 1.7 0.0 - 0.0 %    Seg Neutrophils Absolute, Manual 11.92 (H) 1.20 - 7.00 x10*3/uL    Lymphocytes Absolute, Manual 0.69 (L) 1.20 - 4.80 x10*3/uL    Monocytes Absolute, Manual 0.35 0.10 - 1.00 x10*3/uL    Eosinophils Absolute, Manual 0.12 0.00 - 0.70 x10*3/uL    Basophils Absolute, Manual 0.00 0.00 - 0.10 x10*3/uL    Myelocytes Absolute, Manual 0.23 0.00 - 0.00 x10*3/uL    Total Cells Counted 115     RBC Morphology See Below     Polychromasia Mild     Hypochromia Mild     Target Cells Many            Assessment/Plan   Principal Problem:    Sickle cell disease with crisis and other complication (CMS/HCC)  Active Problems:    Sickle cell anemia without crisis (CMS/HCC)    Senait Narvaez is a 53 y.o. 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 Plan originally was to be admitted 2/29 for line placement for planned routine RBC exchange 3/1. 2/29 IR pending for apheresis line. Lysis labs at baseline. 2/27 CXR w/o evidence of acute process. Admitted for further pain mgmt. Started on IV dilaudid (2/28- current) for pain mgmt per [facility].         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  - Managed through routine RBC exchanges q6 weeks, most recent RBCEx 1/20/24   - OARRS reviewed, no aberrant behavior noted (Last refilled oxy 10mg tab qty 75, x12 days on 2/12)   - No leukocytosis on admit, afebrile, no s/s infectious on admit   - 2/27 Flu A/B/COVID negative on admit   - Hemolysis labs at baseline,  Hg baseline ~8-11; Hg 10 on admit  - 2/27 CXR w/o evidence of acute process, atelectasis vs scarring   - Care plan from 12/6/23- For mild to moderate pain: Dilaudid 0.5mg IVP q3h as needed, for moderate to severe pain Dilaudid 1- 1.5mg q3h PRN  - For pain management - started on IV dilaudid 1 mg q3 PRN on admit (2/28- current)   - bowel regimen for opioid induced constipation, zofran for opioid induced nausea, and benadrly for opioid induced pruritus; LBM 2/28  - c/w home Duloxetine 40mg daily, PO Zofran PRN, Folic Acid 1mg daily, and MVI daily  - Plan for apheresis line placement 2/29 and routine RBCEx 3/1   - Hg S 27.9%, C 26.7%, other pre-exchange labs obtained in [facility]   - Utox pending collection      # pHTN   - 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 Yi and Dr. 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 (PFT to reevaluate for obstruction and small airway disease), and sleep apnea    - c/w home 2 L via CPAP at night   - Follows with pulmonology outpt     # Hx SVT  - Baseline admission EKG pending on admit   - ECHO (6/29/23) EF 60-65%  - c/w home Metoprolol Succs 25mg daily and 20mg lasix every other day   - Follows with Cardiology outpt, 2/13 most recent OP visit, tolerating all meds including lasix 20 mg every other day, metop tartrate 12.5mg BID to control SVT  - Telemetry ordered for 3/1 once exchange started      # DVT/ PE/ SVC Thrombus  - Hx SVC stricture s/p SVC angioplasty  - B/l Upper Extremity and Facial Swelling d/t partial filling defect within the SVC and a thrombus of the SVC complicated by bilateral Mediport catheters. On  lifelong anticoagulation, DOAC discouraged due to high risk of clotting   - Continue home Coumadin 5mg daily, held on admit for line placement in IR 2/29, will resume afterward   - INR 1.7 (2/28)   - Monitors INR with at home Coagcheck device  - Follows with Coumadin clinic outpatient  - Caution with fluids      # CKD II  - Baseline ~ SCr <2. Cr 1.13 on admission  - Thought to be related to hypotension mediated by metoprolol (hx of SVT) and furosemide (hx of heart failure)  - Follows with nephrology outpt - most recent appointment 1/11/24, check labs q6 mo, FUV in 1yr Nephrology   - 2/28 PM labs mild hemolysis detected K+ 5.4 and Scr 1.44, 2/29 repeat AM labs pending      # CAN  - Continue home CPAP   - Continue home Albuterol PRN     # H/O Cauda Equine syndrome  - Follows Dr. Delacruz as outpatient     # dispo  - Full code  - DC home resumed O2 pending RBCex and improvement in pain  - Access: PIV   - FUV 4/15 Rheum, 7/15 Cards     I spent >60 minutes in the professional and overall care of this patient.     Assessment and plan discussed with attending physician Dr. Cr.     Marisa Brizuela, APRN-CNP

## 2024-02-29 NOTE — PRE-PROCEDURE NOTE
Interventional Radiology Preprocedure Note    Indication for procedure: The primary encounter diagnosis was Sickle cell anemia without crisis (CMS/HCC). A diagnosis of CAN on CPAP was also pertinent to this visit.    Relevant review of systems: NA    Relevant Labs:   Lab Results   Component Value Date    CREATININE 1.72 (H) 02/29/2024    EGFR 35 (L) 02/29/2024    INR 1.7 (H) 02/28/2024    PROTIME 19.3 (H) 02/28/2024       Planned Sedation/Anesthesia: Minimal    Airway assessment: normal    Directed physical examination:    Aox3  Normal rate of respirations    Mallampati: II (hard and soft palate, upper portion of tonsils anduvula visible)    ASA Score: ASA 2 - Patient with mild systemic disease with no functional limitations    Benefits, risks and alternatives of procedure and planned sedation have been discussed with the patient and/or their representative. All questions answered and they agree to proceed.

## 2024-02-29 NOTE — POST-PROCEDURE NOTE
Interventional Radiology Brief Postprocedure Note    Attending: Marcos Moser MD    Assistant: Vargas Da Silva MD    Diagnosis: Sickle cell disease    Description of procedure: Technically successful insertion of 13 Fr temporary trialysis catheter into the right common femoral vein. Please see separately dictated full report on PACS.     Anesthesia:  Topical    Complications: None    Estimated Blood Loss: minimal    Medications  As of 02/29/24 1126      DULoxetine (Cymbalta) DR capsule 40 mg (mg) Total dose:  80 mg Dosing weight:  109      Date/Time Rate/Dose/Volume Action       02/28/24  1610 40 mg Given     02/29/24  0919 40 mg Given               folic acid (Folvite) tablet 1 mg (mg) Total dose:  2 mg      Date/Time Rate/Dose/Volume Action       02/28/24  1432 1 mg Given     02/29/24  0919 1 mg Given               metoprolol tartrate (Lopressor) tablet 12.5 mg (mg) Total dose:  25 mg      Date/Time Rate/Dose/Volume Action       02/28/24  2040 12.5 mg Given     02/29/24  0924 12.5 mg Given               loratadine (Claritin) tablet 10 mg (mg) Total dose:  10 mg      Date/Time Rate/Dose/Volume Action       02/28/24  1432 10 mg Given               multivitamin with minerals 1 tablet (tablet) Total dose:  2 tablet Dosing weight:  109      Date/Time Rate/Dose/Volume Action       02/28/24  1432 1 tablet Given     02/29/24  0700 1 tablet Given               oxygen (O2) therapy (L/min) Total volume:  Not documented*   *Total volume has not been documented. View each administration to see the amount administered.     Date/Time Rate/Dose/Volume Action       02/28/24  1345 2 L/min - 120,000 mL/hr Start      1724 4 L/min - 240,000 mL/hr Rate Change               warfarin (Coumadin) tablet 5 mg (mg) Total dose:  Cannot be calculated*   *Administration dose not documented     Date/Time Rate/Dose/Volume Action       02/28/24  1354 *Not included in total Held by provider      1800 *5 mg Missed               sennosides-docusate  sodium (Nita-Colace) 8.6-50 mg per tablet 2 tablet (tablet) Total dose:  4 tablet Dosing weight:  109      Date/Time Rate/Dose/Volume Action       02/28/24  2040 2 tablet Given     02/29/24 0919 2 tablet Given               HYDROmorphone (Dilaudid) injection 1 mg (mg) Total dose:  6 mg Dosing weight:  109      Date/Time Rate/Dose/Volume Action       02/28/24  1606 1 mg Given      1920 1 mg Given      2252 1 mg Given     02/29/24  0200 1 mg Given      0542 1 mg Given      0910 1 mg Given               polyethylene glycol (Glycolax, Miralax) packet 17 g (g) Total dose:  17 g* Dosing weight:  109   *Administration not included in total     Date/Time Rate/Dose/Volume Action       02/28/24  1432 17 g Given     02/29/24  0900 *17 g Missed               furosemide (Lasix) tablet 20 mg (mg) Total dose:  20 mg      Date/Time Rate/Dose/Volume Action       02/29/24 0924 20 mg Given                   No specimens collected      See detailed result report with images in PACS.    The patient tolerated the procedure well without incident or complication and is in stable condition.

## 2024-02-29 NOTE — SIGNIFICANT EVENT
Rapid Response RN responding for RADAR score of 6 due to the following VS: T 36.2 °Celsius; HR 67 ; RR 18; /68; SPO2 93% .      Reviewed abnormal VS with bedside RN who expressed no concerns regarding the patient's current condition based upon these VS.  No interventions by Rapid Response team indicated at this time.

## 2024-03-01 ENCOUNTER — APPOINTMENT (OUTPATIENT)
Dept: RADIOLOGY | Facility: HOSPITAL | Age: 54
DRG: 812 | End: 2024-03-01
Payer: COMMERCIAL

## 2024-03-01 ENCOUNTER — APPOINTMENT (OUTPATIENT)
Dept: OTHER | Facility: HOSPITAL | Age: 54
DRG: 812 | End: 2024-03-01
Payer: COMMERCIAL

## 2024-03-01 ENCOUNTER — HOSPITAL ENCOUNTER (OUTPATIENT)
Dept: CARDIOLOGY | Facility: HOSPITAL | Age: 54
Discharge: HOME | End: 2024-03-01
Payer: COMMERCIAL

## 2024-03-01 LAB
ALBUMIN SERPL BCP-MCNC: 3.4 G/DL (ref 3.4–5)
ALBUMIN SERPL BCP-MCNC: 3.5 G/DL (ref 3.4–5)
ALP SERPL-CCNC: 97 U/L (ref 33–110)
ALP SERPL-CCNC: 99 U/L (ref 33–110)
ALT SERPL W P-5'-P-CCNC: 6 U/L (ref 7–45)
ALT SERPL W P-5'-P-CCNC: 7 U/L (ref 7–45)
ANION GAP SERPL CALC-SCNC: 10 MMOL/L (ref 10–20)
ANION GAP SERPL CALC-SCNC: 11 MMOL/L (ref 10–20)
AST SERPL W P-5'-P-CCNC: 11 U/L (ref 9–39)
AST SERPL W P-5'-P-CCNC: 12 U/L (ref 9–39)
BASOPHILS # BLD MANUAL: 0 X10*3/UL (ref 0–0.1)
BASOPHILS # BLD MANUAL: 0.07 X10*3/UL (ref 0–0.1)
BASOPHILS NFR BLD MANUAL: 0 %
BASOPHILS NFR BLD MANUAL: 0.9 %
BILIRUB SERPL-MCNC: 0.9 MG/DL (ref 0–1.2)
BILIRUB SERPL-MCNC: 1.4 MG/DL (ref 0–1.2)
BLOOD EXPIRATION DATE: NORMAL
BUN SERPL-MCNC: 22 MG/DL (ref 6–23)
BUN SERPL-MCNC: 26 MG/DL (ref 6–23)
CA-I BLD-SCNC: 0.95 MMOL/L (ref 1.1–1.33)
CALCIUM SERPL-MCNC: 8.4 MG/DL (ref 8.6–10.6)
CALCIUM SERPL-MCNC: 8.5 MG/DL (ref 8.6–10.6)
CHLORIDE SERPL-SCNC: 103 MMOL/L (ref 98–107)
CHLORIDE SERPL-SCNC: 98 MMOL/L (ref 98–107)
CO2 SERPL-SCNC: 30 MMOL/L (ref 21–32)
CO2 SERPL-SCNC: 32 MMOL/L (ref 21–32)
CREAT SERPL-MCNC: 1.29 MG/DL (ref 0.5–1.05)
CREAT SERPL-MCNC: 1.52 MG/DL (ref 0.5–1.05)
DISPENSE STATUS: NORMAL
EGFRCR SERPLBLD CKD-EPI 2021: 40 ML/MIN/1.73M*2
EGFRCR SERPLBLD CKD-EPI 2021: 49 ML/MIN/1.73M*2
EOSINOPHIL # BLD MANUAL: 0.32 X10*3/UL (ref 0–0.7)
EOSINOPHIL # BLD MANUAL: 0.33 X10*3/UL (ref 0–0.7)
EOSINOPHIL NFR BLD MANUAL: 3.5 %
EOSINOPHIL NFR BLD MANUAL: 4.3 %
ERYTHROCYTE [DISTWIDTH] IN BLOOD BY AUTOMATED COUNT: 23.8 % (ref 11.5–14.5)
ERYTHROCYTE [DISTWIDTH] IN BLOOD BY AUTOMATED COUNT: 24.3 % (ref 11.5–14.5)
ERYTHROCYTE [DISTWIDTH] IN BLOOD BY AUTOMATED COUNT: 29.5 % (ref 11.5–14.5)
GLUCOSE SERPL-MCNC: 101 MG/DL (ref 74–99)
GLUCOSE SERPL-MCNC: 88 MG/DL (ref 74–99)
HCT VFR BLD AUTO: 26.3 % (ref 36–46)
HCT VFR BLD AUTO: 29.9 % (ref 36–46)
HCT VFR BLD AUTO: 30.5 % (ref 36–46)
HGB BLD-MCNC: 8.5 G/DL (ref 12–16)
HGB BLD-MCNC: 9.7 G/DL (ref 12–16)
HGB BLD-MCNC: 9.9 G/DL (ref 12–16)
HGB RETIC QN: 21 PG (ref 28–38)
HYPOCHROMIA BLD QL SMEAR: ABNORMAL
HYPOCHROMIA BLD QL SMEAR: ABNORMAL
IMM GRANULOCYTES # BLD AUTO: 0.02 X10*3/UL (ref 0–0.7)
IMM GRANULOCYTES # BLD AUTO: 0.04 X10*3/UL (ref 0–0.7)
IMM GRANULOCYTES NFR BLD AUTO: 0.3 % (ref 0–0.9)
IMM GRANULOCYTES NFR BLD AUTO: 0.4 % (ref 0–0.9)
IMMATURE RETIC FRACTION: 60 %
LDH SERPL L TO P-CCNC: 179 U/L (ref 84–246)
LYMPHOCYTES # BLD MANUAL: 1.1 X10*3/UL (ref 1.2–4.8)
LYMPHOCYTES # BLD MANUAL: 1.33 X10*3/UL (ref 1.2–4.8)
LYMPHOCYTES NFR BLD MANUAL: 14 %
LYMPHOCYTES NFR BLD MANUAL: 14.8 %
MCH RBC QN AUTO: 23 PG (ref 26–34)
MCH RBC QN AUTO: 25.8 PG (ref 26–34)
MCH RBC QN AUTO: 26.4 PG (ref 26–34)
MCHC RBC AUTO-ENTMCNC: 32.3 G/DL (ref 32–36)
MCHC RBC AUTO-ENTMCNC: 32.4 G/DL (ref 32–36)
MCHC RBC AUTO-ENTMCNC: 32.5 G/DL (ref 32–36)
MCV RBC AUTO: 71 FL (ref 80–100)
MCV RBC AUTO: 79 FL (ref 80–100)
MCV RBC AUTO: 82 FL (ref 80–100)
MONOCYTES # BLD MANUAL: 0.42 X10*3/UL (ref 0.1–1)
MONOCYTES # BLD MANUAL: 0.52 X10*3/UL (ref 0.1–1)
MONOCYTES NFR BLD MANUAL: 4.4 %
MONOCYTES NFR BLD MANUAL: 7 %
NEUTS SEG # BLD MANUAL: 5.28 X10*3/UL (ref 1.2–7)
NEUTS SEG # BLD MANUAL: 6.83 X10*3/UL (ref 1.2–7)
NEUTS SEG NFR BLD MANUAL: 71.3 %
NEUTS SEG NFR BLD MANUAL: 71.9 %
NRBC BLD-RTO: 0 /100 WBCS (ref 0–0)
NRBC BLD-RTO: 0 /100 WBCS (ref 0–0)
NRBC BLD-RTO: 8.1 /100 WBCS (ref 0–0)
PLATELET # BLD AUTO: 154 X10*3/UL (ref 150–450)
PLATELET # BLD AUTO: 227 X10*3/UL (ref 150–450)
PLATELET # BLD AUTO: 287 X10*3/UL (ref 150–450)
POLYCHROMASIA BLD QL SMEAR: ABNORMAL
POTASSIUM SERPL-SCNC: 4.8 MMOL/L (ref 3.5–5.3)
POTASSIUM SERPL-SCNC: 4.9 MMOL/L (ref 3.5–5.3)
PRODUCT BLOOD TYPE: 1700
PRODUCT BLOOD TYPE: 7300
PRODUCT BLOOD TYPE: 7300
PRODUCT CODE: NORMAL
PROT SERPL-MCNC: 6 G/DL (ref 6.4–8.2)
PROT SERPL-MCNC: 6.6 G/DL (ref 6.4–8.2)
RBC # BLD AUTO: 3.67 X10*6/UL (ref 4–5.2)
RBC # BLD AUTO: 3.7 X10*6/UL (ref 4–5.2)
RBC # BLD AUTO: 3.84 X10*6/UL (ref 4–5.2)
RBC MORPH BLD: ABNORMAL
RBC MORPH BLD: ABNORMAL
RETICS #: 0.01 X10*6/UL (ref 0.02–0.08)
RETICS/RBC NFR AUTO: 0.2 % (ref 0.5–2)
SICKLE CELLS BLD QL SMEAR: ABNORMAL
SODIUM SERPL-SCNC: 134 MMOL/L (ref 136–145)
SODIUM SERPL-SCNC: 140 MMOL/L (ref 136–145)
TARGETS BLD QL SMEAR: ABNORMAL
TARGETS BLD QL SMEAR: ABNORMAL
TOTAL CELLS COUNTED BLD: 114
TOTAL CELLS COUNTED BLD: 115
UNIT ABO: NORMAL
UNIT NUMBER: NORMAL
UNIT RH: NORMAL
UNIT VOLUME: 288
UNIT VOLUME: 292
UNIT VOLUME: 350
VARIANT LYMPHS # BLD MANUAL: 0.13 X10*3/UL (ref 0–0.5)
VARIANT LYMPHS # BLD MANUAL: 0.59 X10*3/UL (ref 0–0.5)
VARIANT LYMPHS NFR BLD: 1.7 %
VARIANT LYMPHS NFR BLD: 6.2 %
WBC # BLD AUTO: 6.7 X10*3/UL (ref 4.4–11.3)
WBC # BLD AUTO: 7.4 X10*3/UL (ref 4.4–11.3)
WBC # BLD AUTO: 9.5 X10*3/UL (ref 4.4–11.3)
XM INTEP: NORMAL

## 2024-03-01 PROCEDURE — 83021 HEMOGLOBIN CHROMOTOGRAPHY: CPT

## 2024-03-01 PROCEDURE — 85045 AUTOMATED RETICULOCYTE COUNT: CPT

## 2024-03-01 PROCEDURE — 84075 ASSAY ALKALINE PHOSPHATASE: CPT

## 2024-03-01 PROCEDURE — 93005 ELECTROCARDIOGRAM TRACING: CPT

## 2024-03-01 PROCEDURE — 36430 TRANSFUSION BLD/BLD COMPNT: CPT

## 2024-03-01 PROCEDURE — P9016 RBC LEUKOCYTES REDUCED: HCPCS

## 2024-03-01 PROCEDURE — 36512 APHERESIS RBC: CPT | Performed by: PATHOLOGY

## 2024-03-01 PROCEDURE — 2500000004 HC RX 250 GENERAL PHARMACY W/ HCPCS (ALT 636 FOR OP/ED)

## 2024-03-01 PROCEDURE — 85027 COMPLETE CBC AUTOMATED: CPT

## 2024-03-01 PROCEDURE — 36415 COLL VENOUS BLD VENIPUNCTURE: CPT

## 2024-03-01 PROCEDURE — 1200000002 HC GENERAL ROOM WITH TELEMETRY DAILY

## 2024-03-01 PROCEDURE — 99233 SBSQ HOSP IP/OBS HIGH 50: CPT

## 2024-03-01 PROCEDURE — 93010 ELECTROCARDIOGRAM REPORT: CPT | Performed by: INTERNAL MEDICINE

## 2024-03-01 PROCEDURE — 83615 LACTATE (LD) (LDH) ENZYME: CPT

## 2024-03-01 PROCEDURE — 82330 ASSAY OF CALCIUM: CPT

## 2024-03-01 PROCEDURE — 85007 BL SMEAR W/DIFF WBC COUNT: CPT

## 2024-03-01 PROCEDURE — 83020 HEMOGLOBIN ELECTROPHORESIS: CPT

## 2024-03-01 PROCEDURE — 2500000002 HC RX 250 W HCPCS SELF ADMINISTERED DRUGS (ALT 637 FOR MEDICARE OP, ALT 636 FOR OP/ED): Mod: MUE

## 2024-03-01 PROCEDURE — 85007 BL SMEAR W/DIFF WBC COUNT: CPT | Mod: MUE

## 2024-03-01 PROCEDURE — 36512 APHERESIS RBC: CPT

## 2024-03-01 PROCEDURE — 2500000001 HC RX 250 WO HCPCS SELF ADMINISTERED DRUGS (ALT 637 FOR MEDICARE OP)

## 2024-03-01 RX ORDER — HYDROMORPHONE HYDROCHLORIDE 1 MG/ML
1 INJECTION, SOLUTION INTRAMUSCULAR; INTRAVENOUS; SUBCUTANEOUS ONCE
Status: COMPLETED | OUTPATIENT
Start: 2024-03-01 | End: 2024-03-01

## 2024-03-01 RX ORDER — DIPHENHYDRAMINE HCL 25 MG
25 CAPSULE ORAL ONCE
Status: COMPLETED | OUTPATIENT
Start: 2024-03-01 | End: 2024-03-01

## 2024-03-01 RX ORDER — DIPHENHYDRAMINE HYDROCHLORIDE 50 MG/ML
25 INJECTION INTRAMUSCULAR; INTRAVENOUS EVERY 5 MIN PRN
Status: DISCONTINUED | OUTPATIENT
Start: 2024-03-01 | End: 2024-03-01 | Stop reason: HOSPADM

## 2024-03-01 RX ORDER — HEPARIN SODIUM 1000 [USP'U]/ML
1000 INJECTION, SOLUTION INTRAVENOUS; SUBCUTANEOUS ONCE
Status: COMPLETED | OUTPATIENT
Start: 2024-03-01 | End: 2024-03-01

## 2024-03-01 RX ORDER — ACETAMINOPHEN 325 MG/1
650 TABLET ORAL ONCE
Status: COMPLETED | OUTPATIENT
Start: 2024-03-01 | End: 2024-03-01

## 2024-03-01 RX ORDER — CALCIUM CARBONATE 200(500)MG
1500 TABLET,CHEWABLE ORAL EVERY 5 MIN PRN
Status: DISCONTINUED | OUTPATIENT
Start: 2024-03-01 | End: 2024-03-01 | Stop reason: HOSPADM

## 2024-03-01 RX ADMIN — HYDROMORPHONE HYDROCHLORIDE 1 MG: 1 INJECTION, SOLUTION INTRAMUSCULAR; INTRAVENOUS; SUBCUTANEOUS at 12:31

## 2024-03-01 RX ADMIN — METOPROLOL TARTRATE 12.5 MG: 25 TABLET, FILM COATED ORAL at 21:17

## 2024-03-01 RX ADMIN — HYDROMORPHONE HYDROCHLORIDE 1 MG: 1 INJECTION, SOLUTION INTRAMUSCULAR; INTRAVENOUS; SUBCUTANEOUS at 02:49

## 2024-03-01 RX ADMIN — SENNOSIDES AND DOCUSATE SODIUM 2 TABLET: 8.6; 5 TABLET ORAL at 21:17

## 2024-03-01 RX ADMIN — Medication 1 TABLET: at 12:31

## 2024-03-01 RX ADMIN — ACETAMINOPHEN 650 MG: 325 TABLET ORAL at 09:20

## 2024-03-01 RX ADMIN — DIPHENHYDRAMINE HYDROCHLORIDE 25 MG: 25 CAPSULE ORAL at 09:20

## 2024-03-01 RX ADMIN — HYDROMORPHONE HYDROCHLORIDE 1 MG: 1 INJECTION, SOLUTION INTRAMUSCULAR; INTRAVENOUS; SUBCUTANEOUS at 08:58

## 2024-03-01 RX ADMIN — HYDROMORPHONE HYDROCHLORIDE 1 MG: 1 INJECTION, SOLUTION INTRAMUSCULAR; INTRAVENOUS; SUBCUTANEOUS at 21:16

## 2024-03-01 RX ADMIN — CALCIUM GLUCONATE 25 ML/HR: 20 INJECTION, SOLUTION INTRAVENOUS at 09:16

## 2024-03-01 RX ADMIN — WARFARIN SODIUM 5 MG: 5 TABLET ORAL at 18:35

## 2024-03-01 RX ADMIN — HEPARIN SODIUM 1000 UNITS: 1000 INJECTION INTRAVENOUS; SUBCUTANEOUS at 11:10

## 2024-03-01 RX ADMIN — FOLIC ACID 1 MG: 1 TABLET ORAL at 08:58

## 2024-03-01 RX ADMIN — METOPROLOL TARTRATE 12.5 MG: 25 TABLET, FILM COATED ORAL at 08:58

## 2024-03-01 RX ADMIN — HYDROMORPHONE HYDROCHLORIDE 1 MG: 1 INJECTION, SOLUTION INTRAMUSCULAR; INTRAVENOUS; SUBCUTANEOUS at 15:30

## 2024-03-01 RX ADMIN — HYDROMORPHONE HYDROCHLORIDE 1 MG: 1 INJECTION, SOLUTION INTRAMUSCULAR; INTRAVENOUS; SUBCUTANEOUS at 05:58

## 2024-03-01 RX ADMIN — SENNOSIDES AND DOCUSATE SODIUM 2 TABLET: 8.6; 5 TABLET ORAL at 08:58

## 2024-03-01 RX ADMIN — HYDROMORPHONE HYDROCHLORIDE 1 MG: 1 INJECTION, SOLUTION INTRAMUSCULAR; INTRAVENOUS; SUBCUTANEOUS at 11:08

## 2024-03-01 RX ADMIN — HYDROMORPHONE HYDROCHLORIDE 1 MG: 1 INJECTION, SOLUTION INTRAMUSCULAR; INTRAVENOUS; SUBCUTANEOUS at 18:35

## 2024-03-01 RX ADMIN — DULOXETINE 40 MG: 20 CAPSULE, DELAYED RELEASE ORAL at 09:00

## 2024-03-01 ASSESSMENT — PAIN SCALES - GENERAL
PAINLEVEL_OUTOF10: 9
PAINLEVEL_OUTOF10: 9
PAINLEVEL_OUTOF10: 7
PAINLEVEL_OUTOF10: 7
PAINLEVEL_OUTOF10: 10 - WORST POSSIBLE PAIN
PAINLEVEL_OUTOF10: 2
PAINLEVEL_OUTOF10: 10 - WORST POSSIBLE PAIN
PAINLEVEL_OUTOF10: 10 - WORST POSSIBLE PAIN
PAINLEVEL_OUTOF10: 9
PAINLEVEL_OUTOF10: 2
PAINLEVEL_OUTOF10: 10 - WORST POSSIBLE PAIN
PAINLEVEL_OUTOF10: 9
PAINLEVEL_OUTOF10: 7
PAINLEVEL_OUTOF10: 9
PAINLEVEL_OUTOF10: 9
PAINLEVEL_OUTOF10: 2

## 2024-03-01 ASSESSMENT — COGNITIVE AND FUNCTIONAL STATUS - GENERAL
MOBILITY SCORE: 24
DAILY ACTIVITIY SCORE: 24

## 2024-03-01 ASSESSMENT — PAIN - FUNCTIONAL ASSESSMENT
PAIN_FUNCTIONAL_ASSESSMENT: 0-10
PAIN_FUNCTIONAL_ASSESSMENT: 0-10

## 2024-03-01 ASSESSMENT — PAIN DESCRIPTION - DESCRIPTORS: DESCRIPTORS: ACHING;THROBBING

## 2024-03-01 NOTE — NURSING NOTE
Apheresis Nursing Note    Senait Narvaez is a 54 y.o. female presenting for RBCEX.    Pre-Procedure Assessment  Pre-Procedure Assessment  Level of Consciousness: Alert  Orientation Level: Oriented X4  Cognition: Appropriate judgement, Appropriate safety awareness, Follows commands  Pain Score: 7  Pain Type: Chronic pain (generlized)  Acceptable Comfort Level (Numeric): 4  Pain Descriptors: Aching, Throbbing  Pain Interventions: Medication (See MAR)  Access Sites 'Okay to Use' Obtained: Yes  Access Site(s) Assessment: Yes  Estimated Replacement Volume: 1900  Vital Signs  Temp: 36.4 °C (97.5 °F)  Heart Rate: 75  Resp: 16  BP: 140/68  Medical Gas Therapy  SpO2: 95 %  Pulse Oximetry Type: Intermittent  Oximetry Probe Site Location: Finger  Patient Activity During SpO2 Measurement: At rest  Medical Gas Therapy: Supplemental oxygen (2L)  O2 Delivery Method: Nasal cannula  Procedure Information and Time Out  Procedure Type: Red blood cell exchange  Red Cell Diagnosis: Other (see comment) (pt on chronic RBCEX d/t SCD)  Target Hemoglobin S (HgB S) (g/dL): 28 g/dL (pt has s/c)  Target Hematocrit (HCT) %: 28 %  Target Fraction of Cells Remaining (FCR): 52  Units Used: 6  Volume Processed (L):  (n/a)  Fluid Balance: 100%  RBC Exchange Time-Out Completed: Yes  Provider Time Out Completed with: Dr Silas Fowler  Time Out Completed on: 03/01/24  Time Out Completed at: 0925  Equipment and Disposables  Apheresis Machine: Spectra Optia [id number]  Apheresis Machine PM in Date: Yes  Blood Warmer: Astotherm DNA [id number]  Blood Warmer PM in Date: Yes  Kit and Blood/Fluid Warmer Inspection: Tubing inspected with machine prime: Tubing inspected prior to connection to patient.  Kit Type: Exchange kits  Kit Lot Number: [id number]  Kit Expiration Date: 10/01/25  ADC-A Used as Anticoagulant: Yes  ACD-A Lot #: [id number]  ACD-A Expiration Date: 10/01/25  9% Sodium Chloride Lot # (Liter): [id number]  9% Sodium Chloride Expiration Date (Liter):  06/30/25  Sodium Chloride Volume: 100 mL  9% Sodium Chloride Lot #: [id number]  9% Sodium Chloride Expiration Date: 06/30/25  Procedure Start  Start Time: 0933    Post-Procedure Assessment:  Post-Procedure  End Time: 1106  Waste Materials Collected for Research: No  Procedure Outcome: Treatment completed successfully  Adverse Event: No  Post-Procedure Line Assessment: Patent  Post-Procedure Access Care : Loaded/flushed per order, Caps changed, 'Do Not Flush' label applied  Vital Signs  Temp: 36.6 °C (97.9 °F)  Heart Rate: 65  Resp: 16  BP: 123/67  Medical Gas Therapy  SpO2: 98 %  Pulse Oximetry Type: Intermittent  Oximetry Probe Site Location: Finger  Patient Activity During SpO2 Measurement: At rest  Medical Gas Therapy: Supplemental oxygen (2L)  O2 Delivery Method: Nasal cannula  Post-Procedure Patient Fluid Values   Replacement Fluid Intake (mL): 1908 mL  AC Infused (mL): 229 mL  Rinseback Intake (mL): 0 mL  Ca+ Solution Intake (mL): 42 mL  Other Intake (mL): 0 mL  Apheresis Intake Total (mL): 2179 mL  Removed During Apheresis Output (mL): 2310 mL  AC in Bag Output (mL): 169 mL  Samples Output (mL): 12 mL  Apheresis Output Total (mL): 2153 mL  Inlet Volume Processed (mL): 4036 mL  Net Difference (mL): 26 mL  Disposition  Patient's Condition at End of Procedure: Stable  Disposition: N/A - procedure performed at bedside  Report Given to: Floor Nurse  $ Apheresis Charge: Red blood cell exchange    Kelly Dyson RN

## 2024-03-01 NOTE — CARE PLAN
The patient's goals for the shift include      The clinical goals for the shift include patient will remain free of injury throughout shift

## 2024-03-01 NOTE — PROGRESS NOTES
"Senait Narvaez is a 54 y.o. female on day 2 of admission presenting with Sickle cell disease with crisis and other complication (CMS/HCC).    Subjective   Patient seen at bedside. States that she is having generalized pain. Rates pain 8/10 before pain medications and 7/10 after she receives her Dilaudid IV. States that she is experiencing some mild SOB and feels better on her 2L 02. Denies any CP or heart palpitations, N/V/D/C, headache dizziness vision changes or fever/chills.         Objective     Physical Exam  HENT:      Head: Normocephalic.      Nose: Nose normal.      Mouth/Throat:      Mouth: Mucous membranes are moist.   Eyes:      Pupils: Pupils are equal, round, and reactive to light.   Cardiovascular:      Rate and Rhythm: Normal rate.   Pulmonary:      Effort: Pulmonary effort is normal.   Abdominal:      Palpations: Abdomen is soft.   Musculoskeletal:         General: Normal range of motion.      Cervical back: Normal range of motion.   Skin:     General: Skin is warm.      Capillary Refill: Capillary refill takes less than 2 seconds.   Neurological:      General: No focal deficit present.      Mental Status: She is alert.   Psychiatric:         Mood and Affect: Mood normal.         Behavior: Behavior normal.         Last Recorded Vitals  Blood pressure 118/64, pulse 72, temperature 36.8 °C (98.2 °F), resp. rate 16, height 1.651 m (5' 5\"), weight 109 kg (240 lb 4.8 oz), SpO2 98 %.  Intake/Output last 3 Shifts:  I/O last 3 completed shifts:  In: 250 (2.3 mL/kg) [IV Piggyback:250]  Out: - (0 mL/kg)   Weight: 109 kg     Relevant Results                             Assessment/Plan   Principal Problem:    Sickle cell disease with crisis and other complication (CMS/HCC)  Active Problems:    Sickle cell anemia without crisis (CMS/HCC)    Senait Narvaez is a 53 y.o. 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 2/29 line placed by IR 3/1 patient underwent routine exchange transfusion. Lysis labs at baseline. 2/27 CXR w/o evidence of acute process. Admitted for further pain mgmt. Started on IV dilaudid (2/28- current) for pain mgmt per [facility].         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  - Managed through routine RBC exchanges q6 weeks, most recent RBCEx 1/20/24   - OARRS reviewed, no aberrant behavior noted (Last refilled oxy 10mg tab qty 75, x12 days on 2/12)   - No leukocytosis on admit, afebrile, no s/s infectious on admit   - 2/27 Flu A/B/COVID negative on admit   - Hemolysis labs at baseline, Hg baseline ~8-11; Hg 10 on admit  - 2/27 CXR w/o evidence of acute process, atelectasis vs scarring   - Care plan from 12/6/23- For mild to moderate pain: Dilaudid 0.5mg IVP q3h as needed, for moderate to severe pain Dilaudid 1- 1.5mg q3h PRN  - For pain management - started on IV dilaudid 1 mg q3 PRN on admit (2/28- current)   - bowel regimen for opioid induced constipation, zofran for opioid induced nausea, and benadrly for opioid induced pruritus; [facility] 2/28  - c/w home Duloxetine 40mg daily, PO Zofran PRN, Folic Acid 1mg daily, and MVI daily  - Apheresis line placed 2/29   - Received RBC exchange 3/1   - Hg S 27.9%, C 26.7%  - Utox pending collection      # pHTN   - 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 Yi and Dr. Soto  - RHC 6/16/23 with mPA pressure 36, wedge 14, CI 4.2  - Per inpatient note 6/16/23- No further work up for pulm hypertension at this time, however patient should be followed outpatient for pulmonary hypertension (with repeat interval echo), mosaicism on  imaging (PFT to reevaluate for obstruction and small airway disease), and sleep apnea    - c/w home 2 L via CPAP at night   - Follows with pulmonology outpt     # Hx SVT  - Baseline admission EKG showing NSR  - ECHO (6/29/23) EF 60-65%  - c/w home Metoprolol Succs 25mg daily and 20mg lasix every other day   - Follows with Cardiology outpt, 2/13 most recent OP visit, tolerating all meds including lasix 20 mg every other day, metop tartrate 12.5mg BID to control SVT  - Telemetry ordered for 3/1 once exchange started      # DVT/ PE/ SVC Thrombus  - Hx SVC stricture s/p SVC angioplasty  - B/l Upper Extremity and Facial Swelling d/t partial filling defect within the SVC and a thrombus of the SVC complicated by bilateral Mediport catheters. On lifelong anticoagulation, DOAC discouraged due to high risk of clotting   - Continue home Coumadin 5mg daily  - INR 1.7 (2/28)   - Monitors INR with at home Coagcheck device  - Follows with Coumadin clinic outpatient  - Caution with fluids      # CKD II  - Baseline ~ SCr <2. Cr 1.13 on admission  - Thought to be related to hypotension mediated by metoprolol (hx of SVT) and furosemide (hx of heart failure)  - Follows with nephrology outpt - most recent appointment 1/11/24, check labs q6 mo, FUV in 1yr Nephrology   - 2/28 PM labs mild hemolysis detected K+ 5.4 and Scr 1.44     # CAN  - Continue home CPAP   - Continue home Albuterol PRN     # H/O Cauda Equine syndrome  - Follows Dr. Delacruz as outpatient     # dispo  - Full code  - DC home resumed O2 pending RBCex and improvement in pain  - Access: PIV   - FUV 4/15 Rheum, 7/15 Cards         I spent 60 minutes in the professional and overall care of this patient.      Rach Saleh, GRISEL-CNP  Patient discussed with Dr. Cr

## 2024-03-01 NOTE — POST-PROCEDURE NOTE
This is a 54 y.o. female with Sickle Cell Disease who underwent automated red blood cell exchange (RCE) with 6 RBC units with target HgbS 28% and target HCT 28%.     I saw and evaluated the patient during the RCE.  The patient was resting in bed.  Vascular access functioned well.    WBC   Date/Time Value Ref Range Status   03/01/2024 06:18 AM 9.5 4.4 - 11.3 x10*3/uL Final     Hemoglobin   Date/Time Value Ref Range Status   03/01/2024 06:18 AM 8.5 (L) 12.0 - 16.0 g/dL Final     Hematocrit   Date/Time Value Ref Range Status   03/01/2024 06:18 AM 26.3 (L) 36.0 - 46.0 % Final     Platelets   Date/Time Value Ref Range Status   03/01/2024 06:18  150 - 450 x10*3/uL Final        POCT Calcium, Ionized   Date/Time Value Ref Range Status   02/28/2024 11:30 AM 1.07 (L) 1.1 - 1.33 mmol/L Final     Comment:     The performance characteristics of ionized calcium tested  in heparinized plasma or serum have been validated by the  individual  laboratory site where testing is performed.   Testing on heparinized plasma or serum is not approved by   the FDA; however, such approval is not necessary.        Hemoglobin S   Date/Time Value Ref Range Status   02/28/2024 10:20 AM 27.9 (H) <=0.0 % Final        Ferritin   Date/Time Value Ref Range Status   01/16/2024 12:59 PM 41 8 - 150 ng/mL Final        Pre-procedure vital signs at 0920:  T: 36.4 C, HR: 74, RR: 160, BP: 140/68  Pulse oximetry: 95% on 2 L nasal cannula    Post-procedure vital signs at 1106:  T: 36.6 C, HR: 65, RR: 16, BP: 123/67  Pulse oximetry: 98% on 2 L nasal cannula    Outcome: RCE completed.  Adverse Reaction: No    Assessment and Plan:  54 y.o. female with Sickle Cell Disease who underwent RCE.    Draw post-procedure labs  Vascular access to be managed by the clinical team.  No further RCE planned for this admission  Tentative next RCE in 4-6 weeks.

## 2024-03-02 LAB
ALBUMIN SERPL BCP-MCNC: 3.5 G/DL (ref 3.4–5)
ALP SERPL-CCNC: 99 U/L (ref 33–110)
ALT SERPL W P-5'-P-CCNC: 4 U/L (ref 7–45)
ANION GAP SERPL CALC-SCNC: 9 MMOL/L (ref 10–20)
AST SERPL W P-5'-P-CCNC: 10 U/L (ref 9–39)
BASOPHILS # BLD AUTO: 0.02 X10*3/UL (ref 0–0.1)
BASOPHILS NFR BLD AUTO: 0.2 %
BILIRUB SERPL-MCNC: 1.4 MG/DL (ref 0–1.2)
BUN SERPL-MCNC: 16 MG/DL (ref 6–23)
CALCIUM SERPL-MCNC: 8.9 MG/DL (ref 8.6–10.6)
CARDIAC TROPONIN I PNL SERPL HS: <3 NG/L (ref 0–34)
CHLORIDE SERPL-SCNC: 102 MMOL/L (ref 98–107)
CO2 SERPL-SCNC: 33 MMOL/L (ref 21–32)
CREAT SERPL-MCNC: 0.96 MG/DL (ref 0.5–1.05)
EGFRCR SERPLBLD CKD-EPI 2021: 70 ML/MIN/1.73M*2
EOSINOPHIL # BLD AUTO: 0.46 X10*3/UL (ref 0–0.7)
EOSINOPHIL NFR BLD AUTO: 5.2 %
ERYTHROCYTE [DISTWIDTH] IN BLOOD BY AUTOMATED COUNT: 23.6 % (ref 11.5–14.5)
GLUCOSE SERPL-MCNC: 78 MG/DL (ref 74–99)
HCT VFR BLD AUTO: 28.1 % (ref 36–46)
HGB BLD-MCNC: 9.4 G/DL (ref 12–16)
HYPOCHROMIA BLD QL SMEAR: NORMAL
IMM GRANULOCYTES # BLD AUTO: 0.02 X10*3/UL (ref 0–0.7)
IMM GRANULOCYTES NFR BLD AUTO: 0.2 % (ref 0–0.9)
LYMPHOCYTES # BLD AUTO: 1.8 X10*3/UL (ref 1.2–4.8)
LYMPHOCYTES NFR BLD AUTO: 20.4 %
MAGNESIUM SERPL-MCNC: 2.31 MG/DL (ref 1.6–2.4)
MCH RBC QN AUTO: 25.7 PG (ref 26–34)
MCHC RBC AUTO-ENTMCNC: 33.5 G/DL (ref 32–36)
MCV RBC AUTO: 77 FL (ref 80–100)
MONOCYTES # BLD AUTO: 0.76 X10*3/UL (ref 0.1–1)
MONOCYTES NFR BLD AUTO: 8.6 %
NEUTROPHILS # BLD AUTO: 5.76 X10*3/UL (ref 1.2–7.7)
NEUTROPHILS NFR BLD AUTO: 65.4 %
NRBC BLD-RTO: 0.3 /100 WBCS (ref 0–0)
PLATELET # BLD AUTO: 157 X10*3/UL (ref 150–450)
POLYCHROMASIA BLD QL SMEAR: NORMAL
POTASSIUM SERPL-SCNC: 4.7 MMOL/L (ref 3.5–5.3)
PROT SERPL-MCNC: 6.6 G/DL (ref 6.4–8.2)
RBC # BLD AUTO: 3.66 X10*6/UL (ref 4–5.2)
RBC MORPH BLD: NORMAL
SCHISTOCYTES BLD QL SMEAR: NORMAL
SODIUM SERPL-SCNC: 139 MMOL/L (ref 136–145)
TARGETS BLD QL SMEAR: NORMAL
WBC # BLD AUTO: 8.8 X10*3/UL (ref 4.4–11.3)

## 2024-03-02 PROCEDURE — 2500000002 HC RX 250 W HCPCS SELF ADMINISTERED DRUGS (ALT 637 FOR MEDICARE OP, ALT 636 FOR OP/ED)

## 2024-03-02 PROCEDURE — 85025 COMPLETE CBC W/AUTO DIFF WBC: CPT

## 2024-03-02 PROCEDURE — 84484 ASSAY OF TROPONIN QUANT: CPT

## 2024-03-02 PROCEDURE — 94660 CPAP INITIATION&MGMT: CPT

## 2024-03-02 PROCEDURE — 83735 ASSAY OF MAGNESIUM: CPT

## 2024-03-02 PROCEDURE — 2500000001 HC RX 250 WO HCPCS SELF ADMINISTERED DRUGS (ALT 637 FOR MEDICARE OP)

## 2024-03-02 PROCEDURE — 36415 COLL VENOUS BLD VENIPUNCTURE: CPT

## 2024-03-02 PROCEDURE — 1200000002 HC GENERAL ROOM WITH TELEMETRY DAILY

## 2024-03-02 PROCEDURE — 2500000004 HC RX 250 GENERAL PHARMACY W/ HCPCS (ALT 636 FOR OP/ED)

## 2024-03-02 PROCEDURE — 99233 SBSQ HOSP IP/OBS HIGH 50: CPT

## 2024-03-02 PROCEDURE — 84075 ASSAY ALKALINE PHOSPHATASE: CPT

## 2024-03-02 RX ADMIN — FUROSEMIDE 20 MG: 20 TABLET ORAL at 09:17

## 2024-03-02 RX ADMIN — DULOXETINE 40 MG: 20 CAPSULE, DELAYED RELEASE ORAL at 09:16

## 2024-03-02 RX ADMIN — HYDROMORPHONE HYDROCHLORIDE 1 MG: 1 INJECTION, SOLUTION INTRAMUSCULAR; INTRAVENOUS; SUBCUTANEOUS at 09:12

## 2024-03-02 RX ADMIN — METOPROLOL TARTRATE 12.5 MG: 25 TABLET, FILM COATED ORAL at 21:58

## 2024-03-02 RX ADMIN — METOPROLOL TARTRATE 12.5 MG: 25 TABLET, FILM COATED ORAL at 09:21

## 2024-03-02 RX ADMIN — HYDROMORPHONE HYDROCHLORIDE 1 MG: 1 INJECTION, SOLUTION INTRAMUSCULAR; INTRAVENOUS; SUBCUTANEOUS at 15:20

## 2024-03-02 RX ADMIN — HYDROMORPHONE HYDROCHLORIDE 1 MG: 1 INJECTION, SOLUTION INTRAMUSCULAR; INTRAVENOUS; SUBCUTANEOUS at 12:20

## 2024-03-02 RX ADMIN — Medication 1 TABLET: at 15:22

## 2024-03-02 RX ADMIN — HYDROMORPHONE HYDROCHLORIDE 1 MG: 1 INJECTION, SOLUTION INTRAMUSCULAR; INTRAVENOUS; SUBCUTANEOUS at 21:59

## 2024-03-02 RX ADMIN — HYDROMORPHONE HYDROCHLORIDE 1 MG: 1 INJECTION, SOLUTION INTRAMUSCULAR; INTRAVENOUS; SUBCUTANEOUS at 00:36

## 2024-03-02 RX ADMIN — HYDROMORPHONE HYDROCHLORIDE 1 MG: 1 INJECTION, SOLUTION INTRAMUSCULAR; INTRAVENOUS; SUBCUTANEOUS at 05:18

## 2024-03-02 RX ADMIN — FOLIC ACID 1 MG: 1 TABLET ORAL at 09:17

## 2024-03-02 RX ADMIN — WARFARIN SODIUM 5 MG: 5 TABLET ORAL at 18:32

## 2024-03-02 RX ADMIN — SENNOSIDES AND DOCUSATE SODIUM 2 TABLET: 8.6; 5 TABLET ORAL at 09:17

## 2024-03-02 RX ADMIN — HYDROMORPHONE HYDROCHLORIDE 1 MG: 1 INJECTION, SOLUTION INTRAMUSCULAR; INTRAVENOUS; SUBCUTANEOUS at 18:29

## 2024-03-02 ASSESSMENT — PAIN SCALES - GENERAL
PAINLEVEL_OUTOF10: 8
PAINLEVEL_OUTOF10: 9
PAINLEVEL_OUTOF10: 7
PAINLEVEL_OUTOF10: 9
PAINLEVEL_OUTOF10: 6
PAINLEVEL_OUTOF10: 9
PAINLEVEL_OUTOF10: 8

## 2024-03-02 ASSESSMENT — PAIN - FUNCTIONAL ASSESSMENT
PAIN_FUNCTIONAL_ASSESSMENT: 0-10

## 2024-03-02 ASSESSMENT — COGNITIVE AND FUNCTIONAL STATUS - GENERAL
MOBILITY SCORE: 24
MOBILITY SCORE: 24
DAILY ACTIVITIY SCORE: 24
DAILY ACTIVITIY SCORE: 24

## 2024-03-02 NOTE — CARE PLAN
Problem: Pain  Goal: My pain/discomfort is manageable  Outcome: Progressing     Problem: Safety  Goal: Patient will be injury free during hospitalization  Outcome: Progressing  Goal: I will remain free of falls  Outcome: Progressing     Problem: Daily Care  Goal: Daily care needs are met  Outcome: Progressing     Problem: Psychosocial Needs  Goal: Demonstrates ability to cope with hospitalization/illness  Outcome: Progressing  Goal: Collaborate with me, my family, and caregiver to identify my specific goals  Outcome: Progressing     Problem: Discharge Barriers  Goal: My discharge needs are met  Outcome: Progressing     Problem: Pain  Goal: Takes deep breaths with improved pain control throughout the shift  Outcome: Progressing  Goal: Turns in bed with improved pain control throughout the shift  Outcome: Progressing  Goal: Walks with improved pain control throughout the shift  Outcome: Progressing  Goal: Performs ADL's with improved pain control throughout shift  Outcome: Progressing  Goal: Participates in PT with improved pain control throughout the shift  Outcome: Progressing  Goal: Free from opioid side effects throughout the shift  Outcome: Progressing  Goal: Free from acute confusion related to pain meds throughout the shift  Outcome: Progressing       The clinical goals for the shift include pt will rate pain less than 7/10 throughout shift    Over the shift, the patient remained safe and free from injury. Had generalized pain, medicated PRN. VSS. One episode of chest pain, EKG and trops completed.

## 2024-03-02 NOTE — PROGRESS NOTES
"Senait Narvaez is a 54 y.o. female on day 3 of admission presenting with Sickle cell disease with crisis and other complication (CMS/HCC).    Subjective   Patient seen at bedside. States that she continues to have generalized pain. Rates pain 7/10. Denies any new fever/chills, SOB, CP, heart palpitations, N/V/D/C, headache dizziness or vision changes        Objective     Physical Exam  HENT:      Head: Normocephalic.      Nose: Nose normal.      Mouth/Throat:      Mouth: Mucous membranes are moist.   Eyes:      Pupils: Pupils are equal, round, and reactive to light.   Cardiovascular:      Rate and Rhythm: Normal rate.   Pulmonary:      Effort: Pulmonary effort is normal.   Abdominal:      Palpations: Abdomen is soft.   Musculoskeletal:         General: Normal range of motion.      Cervical back: Normal range of motion.   Skin:     General: Skin is warm.      Capillary Refill: Capillary refill takes less than 2 seconds.   Neurological:      General: No focal deficit present.      Mental Status: She is alert.   Psychiatric:         Mood and Affect: Mood normal.         Behavior: Behavior normal.         Last Recorded Vitals  Blood pressure 111/70, pulse 63, temperature 36.4 °C (97.5 °F), temperature source Temporal, resp. rate 18, height 1.651 m (5' 5\"), weight 109 kg (240 lb 4.8 oz), SpO2 94 %.  Intake/Output last 3 Shifts:  I/O last 3 completed shifts:  In: 2179 (20 mL/kg) [Other:2179]  Out: 2153 (19.8 mL/kg) [Other:2153]  Weight: 109 kg     Relevant Results                             Assessment/Plan   Principal Problem:    Sickle cell disease with crisis and other complication (CMS/HCC)  Active Problems:    Sickle cell anemia without crisis (CMS/HCC)    Senait Narvaez is a 53 y.o. 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 2/29 line placed by IR 3/1 patient underwent routine exchange transfusion. Lysis labs at baseline. 2/27 CXR w/o evidence of acute process. Admitted for further pain mgmt. Started on IV dilaudid (2/28- current) for pain mgmt per [facility].         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  - Managed through routine RBC exchanges q6 weeks, most recent RBCEx 1/20/24   - OARRS reviewed, no aberrant behavior noted (Last refilled oxy 10mg tab qty 75, x12 days on 2/12)   - No leukocytosis on admit, afebrile, no s/s infectious on admit   - 2/27 Flu A/B/COVID negative on admit   - Hemolysis labs at baseline, Hg baseline ~8-11; Hg 10 on admit  - 2/27 CXR w/o evidence of acute process, atelectasis vs scarring   - Care plan from 12/6/23- For mild to moderate pain: Dilaudid 0.5mg IVP q3h as needed, for moderate to severe pain Dilaudid 1- 1.5mg q3h PRN  - For pain management - started on IV dilaudid 1 mg q3 PRN on admit (2/28- current)   - bowel regimen for opioid induced constipation, zofran for opioid induced nausea, and benadrly for opioid induced pruritus; [facility] 2/28  - c/w home Duloxetine 40mg daily, PO Zofran PRN, Folic Acid 1mg daily, and MVI daily  - Apheresis line placed 2/29   - Received RBC exchange 3/1   - Hg S 27.9%, C 26.7%, repeat after exchange S 11.5, C 11.2   - Utox pending collection      # pHTN   - 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 Yi and Dr. 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 (PFT to reevaluate for obstruction and  small airway disease), and sleep apnea    - c/w home 2 L via CPAP at night   - Follows with pulmonology outpt     # Hx SVT  - Baseline admission EKG showing NSR  - ECHO (6/29/23) EF 60-65%  - c/w home Metoprolol Succs 25mg daily and 20mg lasix every other day   - Follows with Cardiology outpt, 2/13 most recent OP visit, tolerating all meds including lasix 20 mg every other day, metop tartrate 12.5mg BID to control SVT  - Telemetry ordered for 3/1 once exchange started      # DVT/ PE/ SVC Thrombus  - Hx SVC stricture s/p SVC angioplasty  - B/l Upper Extremity and Facial Swelling d/t partial filling defect within the SVC and a thrombus of the SVC complicated by bilateral Mediport catheters. On lifelong anticoagulation, DOAC discouraged due to high risk of clotting   - Continue home Coumadin 5mg daily  - INR 1.7 (2/28)   - Monitors INR with at home Coagcheck device  - Follows with Coumadin clinic outpatient  - Caution with fluids      # CKD II  - Baseline ~ SCr <2. Cr 1.13 on admission  - Thought to be related to hypotension mediated by metoprolol (hx of SVT) and furosemide (hx of heart failure)  - Follows with nephrology outpt - most recent appointment 1/11/24, check labs q6 mo, FUV in 1yr Nephrology   - 2/28 PM labs mild hemolysis detected K+ 5.4 and Scr 1.44     # CAN  - Continue home CPAP   - Continue home Albuterol PRN     # H/O Cauda Equine syndrome  - Follows Dr. Delacruz as outpatient     # dispo  - Full code  - DC home resumed O2 pending improvement in pain  - Access: PIV   - FUV 4/15 Rheum, 7/15 Cards       I spent 45 minutes in the professional and overall care of this patient.      GRISEL Gomes-CNP  Patient discussed with Dr. Cr

## 2024-03-02 NOTE — CARE PLAN
Problem: Pain  Goal: My pain/discomfort is manageable  Outcome: Progressing     Problem: Safety  Goal: Patient will be injury free during hospitalization  Outcome: Progressing  Goal: I will remain free of falls  Outcome: Progressing     Problem: Daily Care  Goal: Daily care needs are met  Outcome: Progressing     Problem: Psychosocial Needs  Goal: Demonstrates ability to cope with hospitalization/illness  Outcome: Progressing  Goal: Collaborate with me, my family, and caregiver to identify my specific goals  Outcome: Progressing     Problem: Discharge Barriers  Goal: My discharge needs are met  Outcome: Progressing     Problem: Pain  Goal: Takes deep breaths with improved pain control throughout the shift  Outcome: Progressing  Goal: Turns in bed with improved pain control throughout the shift  Outcome: Progressing  Goal: Walks with improved pain control throughout the shift  Outcome: Progressing  Goal: Performs ADL's with improved pain control throughout shift  Outcome: Progressing  Goal: Participates in PT with improved pain control throughout the shift  Outcome: Progressing  Goal: Free from opioid side effects throughout the shift  Outcome: Progressing  Goal: Free from acute confusion related to pain meds throughout the shift  Outcome: Progressing   The patient's goals for the shift include pain control     The clinical goals for the shift include pt will rate pain less than 7/10 throughout shift    Over the shift, the patient did not make progress toward the following goals. Barriers to progression include pain rated 9/10. Recommendations to address these barriers include frequent pain assessment.

## 2024-03-03 ENCOUNTER — PHARMACY VISIT (OUTPATIENT)
Dept: PHARMACY | Facility: CLINIC | Age: 54
End: 2024-03-03
Payer: COMMERCIAL

## 2024-03-03 VITALS
HEIGHT: 65 IN | HEART RATE: 56 BPM | BODY MASS INDEX: 40.04 KG/M2 | SYSTOLIC BLOOD PRESSURE: 105 MMHG | WEIGHT: 240.3 LBS | RESPIRATION RATE: 16 BRPM | DIASTOLIC BLOOD PRESSURE: 66 MMHG | OXYGEN SATURATION: 97 % | TEMPERATURE: 97.7 F

## 2024-03-03 PROCEDURE — 2500000001 HC RX 250 WO HCPCS SELF ADMINISTERED DRUGS (ALT 637 FOR MEDICARE OP)

## 2024-03-03 PROCEDURE — 99239 HOSP IP/OBS DSCHRG MGMT >30: CPT

## 2024-03-03 PROCEDURE — 2500000004 HC RX 250 GENERAL PHARMACY W/ HCPCS (ALT 636 FOR OP/ED)

## 2024-03-03 PROCEDURE — RXMED WILLOW AMBULATORY MEDICATION CHARGE

## 2024-03-03 RX ORDER — WARFARIN SODIUM 5 MG/1
5 TABLET ORAL EVERY EVENING
Qty: 30 TABLET | Refills: 0 | Status: SHIPPED | OUTPATIENT
Start: 2024-03-03 | End: 2024-03-29 | Stop reason: SDUPTHER

## 2024-03-03 RX ADMIN — FOLIC ACID 1 MG: 1 TABLET ORAL at 08:49

## 2024-03-03 RX ADMIN — HYDROMORPHONE HYDROCHLORIDE 1 MG: 1 INJECTION, SOLUTION INTRAMUSCULAR; INTRAVENOUS; SUBCUTANEOUS at 11:51

## 2024-03-03 RX ADMIN — HYDROMORPHONE HYDROCHLORIDE 1 MG: 1 INJECTION, SOLUTION INTRAMUSCULAR; INTRAVENOUS; SUBCUTANEOUS at 01:06

## 2024-03-03 RX ADMIN — SENNOSIDES AND DOCUSATE SODIUM 2 TABLET: 8.6; 5 TABLET ORAL at 08:49

## 2024-03-03 RX ADMIN — HYDROMORPHONE HYDROCHLORIDE 1 MG: 1 INJECTION, SOLUTION INTRAMUSCULAR; INTRAVENOUS; SUBCUTANEOUS at 05:19

## 2024-03-03 RX ADMIN — DULOXETINE 40 MG: 20 CAPSULE, DELAYED RELEASE ORAL at 08:49

## 2024-03-03 RX ADMIN — BENZOCAINE AND MENTHOL 1 LOZENGE: 15; 3.6 LOZENGE ORAL at 10:19

## 2024-03-03 RX ADMIN — METOPROLOL TARTRATE 12.5 MG: 25 TABLET, FILM COATED ORAL at 09:42

## 2024-03-03 RX ADMIN — Medication 1 TABLET: at 09:42

## 2024-03-03 RX ADMIN — HYDROMORPHONE HYDROCHLORIDE 1 MG: 1 INJECTION, SOLUTION INTRAMUSCULAR; INTRAVENOUS; SUBCUTANEOUS at 08:49

## 2024-03-03 ASSESSMENT — PAIN - FUNCTIONAL ASSESSMENT
PAIN_FUNCTIONAL_ASSESSMENT: 0-10

## 2024-03-03 ASSESSMENT — PAIN SCALES - GENERAL
PAINLEVEL_OUTOF10: 9
PAINLEVEL_OUTOF10: 7
PAINLEVEL_OUTOF10: 8

## 2024-03-03 ASSESSMENT — PAIN DESCRIPTION - LOCATION
LOCATION: GENERALIZED
LOCATION: GENERALIZED

## 2024-03-03 ASSESSMENT — PAIN DESCRIPTION - ORIENTATION: ORIENTATION: OTHER (COMMENT)

## 2024-03-03 NOTE — CARE PLAN
The patient's goals for the shift include      The clinical goals for the shift include Patient will remain with managed pain throughout shift.      Problem: Pain  Goal: My pain/discomfort is manageable  Outcome: Progressing     Problem: Safety  Goal: Patient will be injury free during hospitalization  Outcome: Progressing  Goal: I will remain free of falls  Outcome: Progressing     Problem: Daily Care  Goal: Daily care needs are met  Outcome: Progressing     Problem: Psychosocial Needs  Goal: Demonstrates ability to cope with hospitalization/illness  Outcome: Progressing  Goal: Collaborate with me, my family, and caregiver to identify my specific goals  Outcome: Progressing     Problem: Discharge Barriers  Goal: My discharge needs are met  Outcome: Progressing     Problem: Pain  Goal: Takes deep breaths with improved pain control throughout the shift  Outcome: Progressing  Goal: Turns in bed with improved pain control throughout the shift  Outcome: Progressing  Goal: Walks with improved pain control throughout the shift  Outcome: Progressing  Goal: Performs ADL's with improved pain control throughout shift  Outcome: Progressing  Goal: Participates in PT with improved pain control throughout the shift  Outcome: Progressing  Goal: Free from opioid side effects throughout the shift  Outcome: Progressing  Goal: Free from acute confusion related to pain meds throughout the shift  Outcome: Progressing     Problem: Pain - Adult  Goal: Verbalizes/displays adequate comfort level or baseline comfort level  Outcome: Progressing     Problem: Safety - Adult  Goal: Free from fall injury  Outcome: Progressing     Problem: Discharge Planning  Goal: Discharge to home or other facility with appropriate resources  Outcome: Progressing     Problem: Chronic Conditions and Co-morbidities  Goal: Patient's chronic conditions and co-morbidity symptoms are monitored and maintained or improved  Outcome: Progressing     Problem:  Fall/Injury  Goal: Not fall by end of shift  Outcome: Progressing  Goal: Be free from injury by end of the shift  Outcome: Progressing  Goal: Verbalize understanding of personal risk factors for fall in the hospital  Outcome: Progressing  Goal: Verbalize understanding of risk factor reduction measures to prevent injury from fall in the home  Outcome: Progressing  Goal: Use assistive devices by end of the shift  Outcome: Progressing  Goal: Pace activities to prevent fatigue by end of the shift  Outcome: Progressing

## 2024-03-03 NOTE — DISCHARGE SUMMARY
Discharge Diagnosis  Sickle cell disease with crisis and other complication (CMS/[facility])    Issues Requiring Follow-Up  Sickle cell disease     Test Results Pending At Discharge  Pending Labs       Order Current Status    HEMOGLOBIN IDENTIFICATION WITH PATH REVIEW Preliminary result            Hospital Course     Senait Narvaez is a 53 y.o. 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 2/29 line placed by IR 3/1 patient underwent routine exchange transfusion. Lysis labs at baseline. 2/27 CXR w/o evidence of acute process. Admitted for further pain mgmt. Started on IV dilaudid (2/28- 3/3) for pain mgmt per [facility]. 3/3 patient discharged home to follow up with sickle cell team outpatient. Patient sent with Refill for Warfarin x30 days. Patient to continue all other home medications   On the day of discharge, the patient reported feeling well and pain was controlled. Vitals and labs were stable. On exam:  Constitutional: Awake, NAD  ENMT: mucous membranes moist, no apparent injury, no lesions seen  Head/Neck: NCAT  Respiratory/Thorax: CTAB, no wheezing  Cardiovascular: RRR, S1+S2, no murmur  Gastrointestinal: Soft, NT, nondistended, +BS  Extremities: No LE edema  Psychological: Appropriate mood and behavior  Skin: no rash noted   Attending has reviewed all labs and vitals, and discussed and agreed with the discharge plan prior to patient discharge.  Patient discharged in stable condition. > 30 minutes spent on discharge planning        Pertinent Physical Exam At Time of Discharge  Physical Exam  HENT:      Head: Normocephalic.      Nose: Nose normal.      Mouth/Throat:      Mouth: Mucous membranes are moist.   Eyes:       Pupils: Pupils are equal, round, and reactive to light.   Cardiovascular:      Rate and Rhythm: Normal rate.   Pulmonary:      Effort: Pulmonary effort is normal.   Abdominal:      Palpations: Abdomen is soft.   Musculoskeletal:         General: Normal range of motion.      Cervical back: Normal range of motion.   Skin:     General: Skin is warm.   Neurological:      General: No focal deficit present.      Mental Status: She is alert.   Psychiatric:         Mood and Affect: Mood normal.         Behavior: Behavior normal.         Home Medications     Medication List      START taking these medications     benzocaine-menthol 15-3.6 mg lozenge; Commonly known as: Cepastat Sore   Throat; Dissolve 1 lozenge in the mouth every 4 hours if needed for sore   throat.     CHANGE how you take these medications     DULoxetine 20 mg DR capsule; Commonly known as: Cymbalta; Take 2   capsules (40 mg) by mouth once daily. Do not crush or chew.; What changed:   when to take this     CONTINUE taking these medications     albuterol 90 mcg/actuation inhaler; Inhale 2 puffs every 6 hours if   needed for wheezing.   ascorbic acid 500 mg chewable tablet; Commonly known as: Vitamin C   elderberry fruit 350 mg capsule   ergocalciferol 1.25 MG (47979 UT) capsule; Commonly known as: Vitamin   D-2; Take 1 capsule (50,000 Units) by mouth 1 (one) time per week.   fluticasone 50 mcg/actuation nasal spray; Commonly known as: Flonase   folic acid 1 mg tablet; Commonly known as: Folvite   furosemide 20 mg tablet; Commonly known as: Lasix   loratadine 10 mg tablet; Commonly known as: Claritin   metoprolol tartrate 25 mg tablet; Commonly known as: Lopressor; Take 0.5   tablets (12.5 mg) by mouth 2 times a day.   multivitamin with minerals tablet   naloxone 4 mg/0.1 mL nasal spray; Commonly known as: Narcan; Administer   1 spray (4 mg) into affected nostril(s) if needed for opioid reversal. May   repeat every 2-3 minutes if needed, alternating  nostrils, until medical   assistance becomes available.   ondansetron 4 mg tablet; Commonly known as: Zofran; Take 1 tablet (4 mg)   by mouth every 8 hours if needed for nausea or vomiting.   oxyCODONE 10 mg immediate release tablet; Commonly known as: Roxicodone;   Take 1 tablet (10 mg) by mouth every 4 hours if needed for severe pain (7   - 10) (pain).   oxygen gas therapy; Commonly known as: O2; Inhale 1 each continuously.   sennosides-docusate sodium 8.6-50 mg tablet; Commonly known as:   Nita-Colace   warfarin 5 mg tablet; Commonly known as: Coumadin; Take as directed. If   you are unsure how to take this medication, talk to your nurse or doctor.;   Original instructions: Take 1 tablet (5 mg) by mouth once daily in the   evening.     STOP taking these medications     oxygen gas therapy; Commonly known as: O2   traZODone 50 mg tablet; Commonly known as: Desyrel       Outpatient Follow-Up  Future Appointments   Date Time Provider Department Center   4/15/2024 10:30 AM Mirna Vital MD [id number] [facility]   7/15/2024 10:20 AM DO DANIEL Nguyễn [facility]   8/13/2024 11:40 AM DO DANIEL Nguyễn APRN-CNP

## 2024-03-04 LAB
ATRIAL RATE: 62 BPM
HEMOGLOBIN A2: 3 % (ref 2–3.5)
HEMOGLOBIN A: 73.8 % (ref 95.8–98)
HEMOGLOBIN C: 11.2 %
HEMOGLOBIN F: 0.5 % (ref 0–2)
HEMOGLOBIN IDENTIFICATION INTERPRETATION: ABNORMAL
HEMOGLOBIN S: 11.5 %
P AXIS: 31 DEGREES
P OFFSET: 176 MS
P ONSET: 125 MS
PATH REVIEW-HGB IDENTIFICATION: ABNORMAL
PR INTERVAL: 180 MS
Q ONSET: 215 MS
QRS COUNT: 10 BEATS
QRS DURATION: 78 MS
QT INTERVAL: 384 MS
QTC CALCULATION(BAZETT): 389 MS
QTC FREDERICIA: 388 MS
R AXIS: 75 DEGREES
T AXIS: 105 DEGREES
T OFFSET: 407 MS
VENTRICULAR RATE: 62 BPM

## 2024-03-07 ENCOUNTER — ANTICOAGULATION - WARFARIN VISIT (OUTPATIENT)
Dept: CARDIOLOGY | Facility: CLINIC | Age: 54
End: 2024-03-07
Payer: COMMERCIAL

## 2024-03-07 DIAGNOSIS — I82.210 THROMBOSIS OF SUPERIOR VENA CAVA (MULTI): ICD-10-CM

## 2024-03-07 DIAGNOSIS — I26.99 RECURRENT PULMONARY EMBOLISM (MULTI): Primary | ICD-10-CM

## 2024-03-07 LAB
INR IN PPP BY COAGULATION ASSAY EXTERNAL: 2.8
PROTHROMBIN TIME (PT) IN PPP BY COAGULATION ASSAY EXTERNAL: NORMAL SECONDS

## 2024-03-07 NOTE — PROGRESS NOTES
Patient identification verified with 2 identifiers.    Location: [facility] Patient Self-Testing Program [phone]     Referring Physician: TOMAS Albert   Enrollment/ Re-enrollment date: 2024   INR Goal: 2.0-3.0  INR monitoring is per [facility] protocol.  Anticoagulation Medication: warfarin  Indication: Deep Vein Thrombosis (DVT)    Subjective   Bleeding signs/symptoms: No    Bruising: No   Major bleeding event: No  Thrombosis signs/symptoms: No  Thromboembolic event: No  Missed doses: No  Extra doses: No  Medication changes: No  Dietary changes: No  Change in health: No  Change in activity: No  Alcohol: No  Other concerns: No    Upcoming Procedures:  Does the Patient Have any upcoming procedures that require interruption in anticoagulation therapy? no  Does the patient require bridging? no      Anticoagulation Summary  As of 3/7/2024      INR goal:  2.0-3.0   TTR:  70.4 % (3.2 mo)   INR used for dosin.80 (3/7/2024)   Weekly warfarin total:  35 mg               Assessment/Plan   Therapeutic     1. New dose: no change    2. Next INR: 2 weeks      Education provided to patient during the visit:  Patient instructed to call in interim with questions, concerns and changes.   Patient educated on compliance with dosing, follow up appointments, and prescribed plan of care.

## 2024-03-12 ENCOUNTER — TELEPHONE (OUTPATIENT)
Dept: ADMISSION | Facility: HOSPITAL | Age: 54
End: 2024-03-12
Payer: COMMERCIAL

## 2024-03-12 DIAGNOSIS — D57.00 SICKLE CELL CRISIS (MULTI): ICD-10-CM

## 2024-03-12 RX ORDER — OXYCODONE HYDROCHLORIDE 10 MG/1
10 TABLET ORAL EVERY 4 HOURS PRN
Qty: 75 TABLET | Refills: 0 | Status: SHIPPED | OUTPATIENT
Start: 2024-03-12 | End: 2024-03-26 | Stop reason: SDUPTHER

## 2024-03-12 NOTE — TELEPHONE ENCOUNTER
Pt called requesting a refill on her Oxycodone 10mg to be sent to [facility] on file. Message sent to the team.

## 2024-03-20 DIAGNOSIS — D57.00 SICKLE CELL CRISIS (MULTI): Primary | ICD-10-CM

## 2024-03-20 DIAGNOSIS — D57.1 SICKLE CELL ANEMIA WITHOUT CRISIS (MULTI): ICD-10-CM

## 2024-03-21 ENCOUNTER — ANTICOAGULATION - WARFARIN VISIT (OUTPATIENT)
Dept: CARDIOLOGY | Facility: CLINIC | Age: 54
End: 2024-03-21
Payer: COMMERCIAL

## 2024-03-21 DIAGNOSIS — I82.210 THROMBOSIS OF SUPERIOR VENA CAVA (MULTI): ICD-10-CM

## 2024-03-21 DIAGNOSIS — I26.99 RECURRENT PULMONARY EMBOLISM (MULTI): ICD-10-CM

## 2024-03-21 DIAGNOSIS — I82.4Y9 DEEP VEIN THROMBOSIS (DVT) OF PROXIMAL LOWER EXTREMITY, UNSPECIFIED CHRONICITY, UNSPECIFIED LATERALITY (MULTI): Primary | ICD-10-CM

## 2024-03-21 LAB
INR IN PPP BY COAGULATION ASSAY EXTERNAL: 2.6
PROTHROMBIN TIME (PT) IN PPP BY COAGULATION ASSAY EXTERNAL: NORMAL SECONDS

## 2024-03-21 NOTE — PROGRESS NOTES
Patient identification verified with 2 identifiers.    Location: [facility] Patient Self-Testing Program [phone]    Referring Physician: TOMAS Albert   Enrollment/ Re-enrollment date: 2024   INR Goal: 2.0-3.0  INR monitoring is per [facility] protocol.  Anticoagulation Medication: warfarin  Indication: Deep Vein Thrombosis (DVT)    Subjective   Bleeding signs/symptoms: No    Bruising: No   Major bleeding event: No  Thrombosis signs/symptoms: No  Thromboembolic event: No  Missed doses: No  Extra doses: No  Medication changes: No  Dietary changes: No  Change in health: No  Change in activity: No  Alcohol: No  Other concerns: No    Upcoming Procedures:  Does the Patient Have any upcoming procedures that require interruption in anticoagulation therapy? no  Does the patient require bridging? no      Anticoagulation Summary  As of 3/21/2024      INR goal:  2.0-3.0   TTR:  74.2 % (3.7 mo)   INR used for dosin.60 (3/21/2024)   Weekly warfarin total:  35 mg               Assessment/Plan   Therapeutic     1. New dose: no change    2. Next INR: 2 weeks      Education provided to patient during the visit:  Patient instructed to call in interim with questions, concerns and changes.

## 2024-03-26 ENCOUNTER — TELEPHONE (OUTPATIENT)
Dept: ADMISSION | Facility: HOSPITAL | Age: 54
End: 2024-03-26
Payer: COMMERCIAL

## 2024-03-26 DIAGNOSIS — D57.00 SICKLE CELL CRISIS (MULTI): ICD-10-CM

## 2024-03-26 RX ORDER — OXYCODONE HYDROCHLORIDE 10 MG/1
10 TABLET ORAL EVERY 4 HOURS PRN
Qty: 75 TABLET | Refills: 0 | Status: SHIPPED | OUTPATIENT
Start: 2024-03-26 | End: 2024-04-09 | Stop reason: SDUPTHER

## 2024-03-29 DIAGNOSIS — I82.210 THROMBOSIS OF SUPERIOR VENA CAVA (MULTI): ICD-10-CM

## 2024-03-29 RX ORDER — WARFARIN SODIUM 5 MG/1
5 TABLET ORAL EVERY EVENING
Qty: 30 TABLET | Refills: 0 | Status: SHIPPED | OUTPATIENT
Start: 2024-03-29 | End: 2024-05-02 | Stop reason: SDUPTHER

## 2024-04-02 ENCOUNTER — TELEPHONE (OUTPATIENT)
Dept: HEMATOLOGY/ONCOLOGY | Facility: HOSPITAL | Age: 54
End: 2024-04-02
Payer: COMMERCIAL

## 2024-04-02 DIAGNOSIS — D57.00 SICKLE CELL CRISIS (MULTI): ICD-10-CM

## 2024-04-02 NOTE — TELEPHONE ENCOUNTER
This nurse called the pt to follow up on her request to move her full exchange appt from FUV 4/10, admission on 4/11 and exchange 4/12. Wanting new date, the week of 4/15. Pt informed unable to confirm if appointment able to be moved at this time. Pt states she is trying to go out of town with family, but states will not go if exchange unable to be confirmed for a new date. This nurse informed pt will call on 4/9 with updates on exchange dates.

## 2024-04-04 ENCOUNTER — ANTICOAGULATION - WARFARIN VISIT (OUTPATIENT)
Dept: CARDIOLOGY | Facility: CLINIC | Age: 54
End: 2024-04-04
Payer: COMMERCIAL

## 2024-04-04 DIAGNOSIS — I82.4Y9 DEEP VEIN THROMBOSIS (DVT) OF PROXIMAL LOWER EXTREMITY, UNSPECIFIED CHRONICITY, UNSPECIFIED LATERALITY (MULTI): Primary | ICD-10-CM

## 2024-04-04 DIAGNOSIS — I26.99 RECURRENT PULMONARY EMBOLISM (MULTI): ICD-10-CM

## 2024-04-04 DIAGNOSIS — I82.210 THROMBOSIS OF SUPERIOR VENA CAVA (MULTI): ICD-10-CM

## 2024-04-04 NOTE — PROGRESS NOTES
Patient identification verified with 2 identifiers.    Location: [facility] Patient Self-Testing Program [phone]    Referring Physician: TOMAS Albert   Enrollment/ Re-enrollment date: 2024   INR Goal: 2.0-3.0  INR monitoring is per [facility] protocol.  Anticoagulation Medication: warfarin  Indication: Deep Vein Thrombosis (DVT) and Pulmonary Embolism (PE)    Subjective   Bleeding signs/symptoms: No    Bruising: No   Major bleeding event: No  Thrombosis signs/symptoms: No  Thromboembolic event: No  Missed doses: No  Extra doses: No  Medication changes: No  Dietary changes: No  Change in health: No  Change in activity: No  Alcohol: No  Other concerns: No    Upcoming Procedures:  Does the Patient Have any upcoming procedures that require interruption in anticoagulation therapy? no  Does the patient require bridging? no      Anticoagulation Summary  As of 2024      INR goal:  2.0-3.0   TTR:  77.1 % (4.1 mo)   INR used for dosin.40 (2024)   Weekly warfarin total:  35 mg               Assessment/Plan   Therapeutic     1. New dose: no change    2. Next INR: 2 weeks      Education provided to patient during the visit:  Patient instructed to call in interim with questions, concerns and changes.

## 2024-04-09 RX ORDER — OXYCODONE HYDROCHLORIDE 10 MG/1
10 TABLET ORAL EVERY 4 HOURS PRN
Qty: 75 TABLET | Refills: 0 | Status: SHIPPED | OUTPATIENT
Start: 2024-04-09 | End: 2024-04-23 | Stop reason: SDUPTHER

## 2024-04-10 ENCOUNTER — OFFICE VISIT (OUTPATIENT)
Dept: HEMATOLOGY/ONCOLOGY | Facility: HOSPITAL | Age: 54
End: 2024-04-10
Payer: COMMERCIAL

## 2024-04-10 VITALS
HEIGHT: 64 IN | BODY MASS INDEX: 41.42 KG/M2 | HEART RATE: 91 BPM | TEMPERATURE: 97 F | WEIGHT: 242.6 LBS | OXYGEN SATURATION: 95 % | RESPIRATION RATE: 16 BRPM | DIASTOLIC BLOOD PRESSURE: 53 MMHG | SYSTOLIC BLOOD PRESSURE: 100 MMHG

## 2024-04-10 DIAGNOSIS — D57.219 SICKLE-CELL-HEMOGLOBIN C DISEASE WITH CRISIS (MULTI): Primary | ICD-10-CM

## 2024-04-10 DIAGNOSIS — I82.210 THROMBOSIS OF SUPERIOR VENA CAVA (MULTI): ICD-10-CM

## 2024-04-10 DIAGNOSIS — Z92.89 HISTORY OF EXCHANGE TRANSFUSION: ICD-10-CM

## 2024-04-10 PROCEDURE — 99214 OFFICE O/P EST MOD 30 MIN: CPT | Performed by: PEDIATRICS

## 2024-04-10 PROCEDURE — 3008F BODY MASS INDEX DOCD: CPT | Performed by: PEDIATRICS

## 2024-04-10 ASSESSMENT — PAIN SCALES - GENERAL: PAINLEVEL: 7

## 2024-04-12 DIAGNOSIS — Z82.61 FAMILY HISTORY OF RHEUMATOID ARTHRITIS: Primary | ICD-10-CM

## 2024-04-15 ENCOUNTER — TELEPHONE (OUTPATIENT)
Dept: ADMISSION | Facility: HOSPITAL | Age: 54
End: 2024-04-15

## 2024-04-15 DIAGNOSIS — D57.00 SICKLE CELL CRISIS (MULTI): ICD-10-CM

## 2024-04-15 RX ORDER — DULOXETIN HYDROCHLORIDE 20 MG/1
40 CAPSULE, DELAYED RELEASE ORAL NIGHTLY
Qty: 60 CAPSULE | Refills: 3 | Status: SHIPPED | OUTPATIENT
Start: 2024-04-15 | End: 2024-05-08 | Stop reason: ALTCHOICE

## 2024-04-15 NOTE — TELEPHONE ENCOUNTER
[facility]   Duloxetine 20mg. 2 capsules at HS  Last FUV 4/10

## 2024-04-16 ASSESSMENT — ENCOUNTER SYMPTOMS
EYE PROBLEMS: 0
BLOOD IN STOOL: 0
VOMITING: 0
COUGH: 0
PALPITATIONS: 0
FEVER: 0
HEMATURIA: 0
EXTREMITY WEAKNESS: 0
WHEEZING: 0
FREQUENCY: 0
FLANK PAIN: 0
ARTHRALGIAS: 1
CHILLS: 0
ADENOPATHY: 0
WOUND: 0
FATIGUE: 1
DYSURIA: 0
MYALGIAS: 0
CONSTIPATION: 0
DEPRESSION: 0
LEG SWELLING: 0
SCLERAL ICTERUS: 0
BRUISES/BLEEDS EASILY: 0
BACK PAIN: 1
NUMBNESS: 0
SHORTNESS OF BREATH: 1
HEMOPTYSIS: 0
CHEST TIGHTNESS: 0
NERVOUS/ANXIOUS: 0
DIARRHEA: 0
ABDOMINAL PAIN: 0
DIAPHORESIS: 0
SORE THROAT: 0
NAUSEA: 0
HEADACHES: 0
LIGHT-HEADEDNESS: 0

## 2024-04-16 NOTE — PROGRESS NOTES
SICKLE CELL OUTPATIENT NOTE  Patient ID: Senait Narvaez   Visit Type: Follow up visit     ASSESSMENT AND PLAN  Senait Narvaez is 54 y.o. female with     History of Hb SC SCD with chronic pain which is at her baseline. No changes in her home analgesics   Oral tylenol 650 mg every 6 hours prn for mild to moderate pain   Oral oxycodone 10-20 mg every 4-6 hours as needed for moderate to severe and breakthrough pain   Oral duloxetine 40 mg daily for chronic pain and also anxiety/depression   Non pharmacologic methods of pain control   Reviewed [facility]    Encounter for management of disease modifying therapy. Senait Narvaez is currently chronic full automated red cell exchange transfusions. She will like to hold red cell exchange in the interim because she thinks that she has increased pain post exchange transfusions and generally feels unwell for a couple of weeks after treatment. We will hold red cell exchange transfusion.     Chronic anemia secondary to SCD with a hemoglobin of 9.4 g/dl. This is around her baseline    Daily folic acid     History of recurrent VTE/PE with SVC syndrome, on extended duration anticoagulation with warfarin    Oral warfarin 5 mg daily with target INR of 2-3      Constipation is well controlled  Continue current laxatives with colace and miralax    Follow up office visit will be in 4 weeks time     Chief Complaint: Follow up for SCD on chronic red cell exchange transfusion      Interval History: Senait is present with for follow up of SCD. She hs scheduled for red cell exchange transfusion but she will like to hold off because she has a family gathering later this week and also because she usually has more pain necessitating her staying in hospital for a few days after her treatment and generally feels more sick for about 1-2 weeks after the with increased fatigue and lethargy. Chronic pain is at her baseline at about 6-7/10 and is well controlled with her current home oral pain regimen   Facial and neck  swelling are all unchanged from prior. She remains on warfarin as anticoagulation for recurrent VTE, which she is tolerating without increased bruising or bleeding. Her INR has been therapeutic between 2-3. She denies palpitations, chest pain or chest tightness, headaches, dizziness, nausea or vomiting. Bronchial asthma has been well controlled. She is afebrile. She is stooling well with current laxatives. She has been compliant with supplemental oxygen 2L at night and has been compliant    Review of System:   Review of Systems   Constitutional:  Positive for fatigue. Negative for chills, diaphoresis and fever.   HENT:   Negative for mouth sores, nosebleeds and sore throat.    Eyes:  Negative for eye problems and icterus.   Respiratory:  Positive for shortness of breath. Negative for chest tightness, cough, hemoptysis and wheezing.    Cardiovascular:  Negative for chest pain, leg swelling and palpitations.   Gastrointestinal:  Negative for abdominal pain, blood in stool, constipation, diarrhea, nausea and vomiting.   Genitourinary:  Negative for dysuria, frequency and hematuria.    Musculoskeletal:  Positive for arthralgias and back pain. Negative for flank pain, gait problem and myalgias.   Skin:  Negative for itching, rash and wound.   Neurological:  Negative for extremity weakness, gait problem, headaches, light-headedness and numbness.   Hematological:  Negative for adenopathy. Does not bruise/bleed easily.   Psychiatric/Behavioral:  Negative for depression. The patient is not nervous/anxious.         Allergies:   No Known Allergies    Current Medications:   Outpatient Encounter Medications as of 4/10/2024   Medication Sig    albuterol 90 mcg/actuation inhaler Inhale 2 puffs every 6 hours if needed for wheezing.    ascorbic acid (Vitamin C) 500 mg chewable tablet Chew 1 tablet (500 mg) once daily. As needed    elderberry fruit 350 mg capsule Take 1 capsule by mouth once daily.    fluticasone (Flonase) 50  mcg/actuation nasal spray Administer 2 sprays into each nostril if needed.    folic acid (Folvite) 1 mg tablet Take 1 tablet (1 mg) by mouth once daily.    furosemide (Lasix) 20 mg tablet Take 1 tablet (20 mg) by mouth every other day.    loratadine (Claritin) 10 mg tablet Take 1 tablet (10 mg) by mouth once daily as needed for allergies.    metoprolol tartrate (Lopressor) 25 mg tablet Take 0.5 tablets (12.5 mg) by mouth 2 times a day.    multivitamin with minerals tablet Take 1 tablet by mouth once daily.    naloxone (Narcan) 4 mg/0.1 mL nasal spray Administer 1 spray (4 mg) into affected nostril(s) if needed for opioid reversal. May repeat every 2-3 minutes if needed, alternating nostrils, until medical assistance becomes available.    ondansetron (Zofran) 4 mg tablet Take 1 tablet (4 mg) by mouth every 8 hours if needed for nausea or vomiting.    oxyCODONE (Roxicodone) 10 mg immediate release tablet Take 1 tablet (10 mg) by mouth every 4 hours if needed for severe pain (7 - 10) (pain).    oxygen (O2) gas therapy Inhale 1 each continuously.    sennosides-docusate sodium (Nita-Colace) 8.6-50 mg tablet Take 2 tablets by mouth every 12 hours.    warfarin (Coumadin) 5 mg tablet Take 1 tablet (5 mg) by mouth once daily in the evening.    [DISCONTINUED] DULoxetine (Cymbalta) 20 mg DR capsule Take 2 capsules (40 mg) by mouth once daily. Do not crush or chew. (Patient taking differently: Take 2 capsules (40 mg) by mouth once daily at bedtime. Do not crush or chew.)    [DISCONTINUED] benzocaine-menthol (Cepastat Sore Throat) 15-3.6 mg lozenge Dissolve 1 lozenge in the mouth every 4 hours if needed for sore throat. (Patient not taking: Reported on 4/10/2024)    [DISCONTINUED] oxyCODONE (Roxicodone) 10 mg immediate release tablet Take 1 tablet (10 mg) by mouth every 4 hours if needed for severe pain (7 - 10) (pain).     No facility-administered encounter medications on file as of 4/10/2024.       Past Medical History:   She  has a past medical history of Asthma ([facility]), CHF (congestive heart failure) ([facility]), Chronic pain disorder, Compression of vein (02/19/2020), and Hypotension, unspecified (12/10/2020).    Past Surgical History:   She has a past surgical history that includes Appendectomy (08/05/2013); Cholecystectomy (08/05/2013); Other surgical history (05/13/2014); Other surgical history (10/09/2014); Other surgical history (06/09/2014); MR angio head wo IV contrast (8/16/2014); MR angio neck wo IV contrast (8/16/2014); IR CVC tunneled (3/13/2015); IR CVC tunneled (1/29/2016); IR CVC tunneled (1/17/2018); IR CVC tunneled (2/22/2018); IR CVC tunneled (3/22/2018); IR CVC tunneled (4/18/2018); IR CVC tunneled (5/16/2018); IR CVC tunneled (6/12/2018); US guided biopsy lymph node superficial (9/17/2019); CT guided percutaneous biopsy LYMPH node superficial (9/19/2019); IR CVC tunneled (1/21/2020); IR CVC tunneled (2/28/2020); IR CVC tunneled (4/10/2020); IR CVC tunneled (5/22/2020); IR CVC tunneled (6/19/2020); IR CVC tunneled (7/23/2020); IR CVC tunneled (9/4/2020); IR CVC tunneled (10/9/2020); IR CVC tunneled (11/13/2020); IR CVC tunneled (12/18/2020); IR CVC tunneled (1/22/2021); IR CVC tunneled (2/26/2021); IR CVC tunneled (4/2/2021); IR CVC tunneled (5/7/2021); IR CVC tunneled (6/11/2021); IR CVC tunneled (7/16/2021); IR CVC tunneled (8/20/2021); IR CVC tunneled (9/24/2021); IR CVC tunneled (10/29/2021); IR CVC tunneled (12/3/2021); IR CVC tunneled (1/7/2022); IR CVC tunneled (2/23/2022); IR CVC tunneled (3/25/2022); IR CVC tunneled (4/22/2022); IR CVC tunneled (6/3/2022); IR CVC tunneled (9/18/2017); IR CVC tunneled (10/30/2017); IR CVC tunneled (12/19/2017); IR CVC tunneled (1/6/2023); IR CVC tunneled (2/24/2023); IR CVC tunneled (7/8/2022); IR CVC tunneled (8/12/2022); IR CVC tunneled (9/16/2022); CT angio neck (10/19/2022); IR CVC tunneled (10/20/2022); IR CVC tunneled (11/29/2022); IR CVC tunneled (3/31/2023); IR CVC  "tunneled (6/9/2023); IR CVC tunneled (7/20/2023); IR CVC tunneled (8/16/2023); IR CVC tunneled (9/21/2023); IR CVC nontunneled (10/26/2023); and IR CVC nontunneled (12/7/2023).    Family History:   Family History   Problem Relation Name Age of Onset    Diabetes Father      Gout Father      Sickle cell trait Father      Seizures Sister      Diabetes Other      Uterine cancer Other      Other (congenital blindness) Cousin         Social History:   Senait Narvaez  reports that she quit smoking about 10 years ago. Her smoking use included cigarettes. She has been exposed to tobacco smoke. She has never used smokeless tobacco.  reports that she does not currently use drugs.    EXAMINATION FINDINGS   /53 (BP Location: Left arm, Patient Position: Sitting)   Pulse 91   Temp 36.1 °C (97 °F) (Temporal)   Resp 16   Ht (S) 1.632 m (5' 4.25\")   Wt 110 kg (242 lb 9.6 oz)   SpO2 95%   BMI 41.32 kg/m²   2.23 meters squared    Physical Exam  Vitals and nursing note reviewed.   Constitutional:       General: She is not in acute distress.     Appearance: She is not toxic-appearing.   Eyes:      General:         Right eye: No discharge.      Extraocular Movements: Extraocular movements intact.      Pupils: Pupils are equal, round, and reactive to light.   Neck:      Comments: SVC syndrome with distension of her neck veins  Cardiovascular:      Rate and Rhythm: Normal rate and regular rhythm.      Pulses: Normal pulses.      Heart sounds: No murmur heard.     No gallop.   Pulmonary:      Effort: Pulmonary effort is normal. No respiratory distress.      Breath sounds: Normal breath sounds. No wheezing or rales.   Abdominal:      General: Bowel sounds are normal. There is no distension.      Tenderness: There is no abdominal tenderness.   Musculoskeletal:         General: No swelling or deformity.      Cervical back: No rigidity.      Right lower leg: No edema.      Left lower leg: No edema.   Skin:     General: Skin is warm.    "   Capillary Refill: Capillary refill takes less than 2 seconds.      Findings: No lesion or rash.   Neurological:      General: No focal deficit present.      Mental Status: She is alert and oriented to person, place, and time. Mental status is at baseline.      Cranial Nerves: No cranial nerve deficit.      Motor: No weakness.      Gait: Gait normal.   Psychiatric:         Mood and Affect: Mood normal.         Behavior: Behavior normal.       LABS   Anticoagulation - Warfarin Visit on 04/04/2024   Component Date Value Ref Range Status    INR External 04/04/2024 2.40   Final              Erich Whaley MD

## 2024-04-18 ENCOUNTER — ANTICOAGULATION - WARFARIN VISIT (OUTPATIENT)
Dept: CARDIOLOGY | Facility: CLINIC | Age: 54
End: 2024-04-18
Payer: COMMERCIAL

## 2024-04-18 DIAGNOSIS — I82.210 THROMBOSIS OF SUPERIOR VENA CAVA (MULTI): ICD-10-CM

## 2024-04-18 DIAGNOSIS — I82.4Y9 DEEP VEIN THROMBOSIS (DVT) OF PROXIMAL LOWER EXTREMITY, UNSPECIFIED CHRONICITY, UNSPECIFIED LATERALITY (MULTI): Primary | ICD-10-CM

## 2024-04-18 DIAGNOSIS — I26.99 RECURRENT PULMONARY EMBOLISM (MULTI): ICD-10-CM

## 2024-04-18 NOTE — PROGRESS NOTES
Patient identification verified with 2 identifiers.    Location: [facility] Patient Self-Testing Program [phone]    Referring Physician: TOMAS Albert   Enrollment/ Re-enrollment date: 2024   INR Goal: 2.0-3.0  INR monitoring is per [facility] protocol.  Anticoagulation Medication: warfarin  Indication: Deep Vein Thrombosis (DVT) and Pulmonary Embolism (PE)    Subjective   Bleeding signs/symptoms: No    Bruising: No   Major bleeding event: No  Thrombosis signs/symptoms: No  Thromboembolic event: No  Missed doses: No  Extra doses: No  Medication changes: No  Dietary changes: No  Change in health: No  Change in activity: No  Alcohol: No  Other concerns: No    Upcoming Procedures:  Does the Patient Have any upcoming procedures that require interruption in anticoagulation therapy? no  Does the patient require bridging? no      Anticoagulation Summary  As of 2024      INR goal:  2.0-3.0   TTR:  79.4% (4.6 mo)   INR used for dosin.40 (2024)   Weekly warfarin total:  35 mg               Assessment/Plan   Therapeutic     1. New dose: Left VM with dosing instructions and next testing date, asking patient to call with any questions or concerns.    2. Next INR: 2 weeks      Education provided to patient during the visit:  Patient instructed to call in interim with questions, concerns and changes.

## 2024-04-23 ENCOUNTER — TELEPHONE (OUTPATIENT)
Dept: HEMATOLOGY/ONCOLOGY | Facility: HOSPITAL | Age: 54
End: 2024-04-23
Payer: COMMERCIAL

## 2024-04-23 DIAGNOSIS — D57.00 SICKLE CELL CRISIS (MULTI): ICD-10-CM

## 2024-04-23 RX ORDER — OXYCODONE HYDROCHLORIDE 10 MG/1
10 TABLET ORAL EVERY 4 HOURS PRN
Qty: 75 TABLET | Refills: 0 | Status: SHIPPED | OUTPATIENT
Start: 2024-04-23 | End: 2024-05-07 | Stop reason: SDUPTHER

## 2024-04-23 NOTE — TELEPHONE ENCOUNTER
Refill request received for Oxycodone 10mg.  Preferred pharmacy is [facility] at [address]  Message sent to Sickle Cell team.

## 2024-05-02 ENCOUNTER — ANTICOAGULATION - WARFARIN VISIT (OUTPATIENT)
Dept: CARDIOLOGY | Facility: CLINIC | Age: 54
End: 2024-05-02
Payer: COMMERCIAL

## 2024-05-02 DIAGNOSIS — I82.210 THROMBOSIS OF SUPERIOR VENA CAVA (MULTI): ICD-10-CM

## 2024-05-02 DIAGNOSIS — I82.4Y9 DEEP VEIN THROMBOSIS (DVT) OF PROXIMAL LOWER EXTREMITY, UNSPECIFIED CHRONICITY, UNSPECIFIED LATERALITY (MULTI): Primary | ICD-10-CM

## 2024-05-02 DIAGNOSIS — D57.1 SICKLE-CELL DISEASE WITHOUT CRISIS (MULTI): ICD-10-CM

## 2024-05-02 DIAGNOSIS — I26.99 RECURRENT PULMONARY EMBOLISM (MULTI): ICD-10-CM

## 2024-05-02 NOTE — PROGRESS NOTES
Patient identification verified with 2 identifiers.    Location: [facility] Patient Self-Testing Program [phone]    Referring Physician: TOMAS Albert   Enrollment/ Re-enrollment date: 11/14/2024   INR Goal: 2.0-3.0  INR monitoring is per [facility] protocol.  Anticoagulation Medication: warfarin  Indication: Deep Vein Thrombosis (DVT) and Pulmonary Embolism (PE)    Subjective   Bleeding signs/symptoms: No    Bruising: No   Major bleeding event: No  Thrombosis signs/symptoms: No  Thromboembolic event: No  Missed doses: No  Extra doses: No  Medication changes: No  Dietary changes: No  Change in health: No  Change in activity: No  Alcohol: No  Other concerns: No    Upcoming Procedures:  Does the Patient Have any upcoming procedures that require interruption in anticoagulation therapy? no  Does the patient require bridging? no      Anticoagulation Summary  As of 5/2/2024      INR goal:  2.0-3.0   TTR:  79.4% (4.6 mo)   INR used for dosing:                 Assessment/Plan   Therapeutic     1. New dose: Spoke to patient with dosing instructions and next testing date.    2. Next INR: 2 weeks      Education provided to patient during the visit:  Patient instructed to call in interim with questions, concerns and changes.

## 2024-05-03 RX ORDER — WARFARIN SODIUM 5 MG/1
5 TABLET ORAL EVERY EVENING
Qty: 30 TABLET | Refills: 0 | Status: ON HOLD | OUTPATIENT
Start: 2024-05-03 | End: 2024-06-02

## 2024-05-03 RX ORDER — ONDANSETRON 4 MG/1
4 TABLET, FILM COATED ORAL EVERY 8 HOURS PRN
Qty: 20 TABLET | Refills: 2 | Status: ON HOLD | OUTPATIENT
Start: 2024-05-03

## 2024-05-07 ENCOUNTER — TELEPHONE (OUTPATIENT)
Dept: ADMISSION | Facility: HOSPITAL | Age: 54
End: 2024-05-07
Payer: COMMERCIAL

## 2024-05-07 DIAGNOSIS — D57.00 SICKLE CELL CRISIS (MULTI): ICD-10-CM

## 2024-05-07 RX ORDER — OXYCODONE HYDROCHLORIDE 10 MG/1
10 TABLET ORAL EVERY 4 HOURS PRN
Qty: 75 TABLET | Refills: 0 | Status: SHIPPED | OUTPATIENT
Start: 2024-05-07 | End: 2024-05-21 | Stop reason: SDUPTHER

## 2024-05-08 ENCOUNTER — LAB (OUTPATIENT)
Dept: LAB | Facility: HOSPITAL | Age: 54
End: 2024-05-08
Payer: COMMERCIAL

## 2024-05-08 ENCOUNTER — OFFICE VISIT (OUTPATIENT)
Dept: HEMATOLOGY/ONCOLOGY | Facility: HOSPITAL | Age: 54
End: 2024-05-08
Payer: COMMERCIAL

## 2024-05-08 ENCOUNTER — APPOINTMENT (OUTPATIENT)
Dept: HEMATOLOGY/ONCOLOGY | Facility: HOSPITAL | Age: 54
End: 2024-05-08
Payer: COMMERCIAL

## 2024-05-08 VITALS
OXYGEN SATURATION: 92 % | BODY MASS INDEX: 40.38 KG/M2 | WEIGHT: 237.1 LBS | HEART RATE: 70 BPM | SYSTOLIC BLOOD PRESSURE: 137 MMHG | TEMPERATURE: 97.2 F | DIASTOLIC BLOOD PRESSURE: 64 MMHG | RESPIRATION RATE: 16 BRPM

## 2024-05-08 DIAGNOSIS — D57.219 SICKLE-CELL-HEMOGLOBIN C DISEASE WITH CRISIS (MULTI): Primary | ICD-10-CM

## 2024-05-08 DIAGNOSIS — K59.00 CONSTIPATION, UNSPECIFIED CONSTIPATION TYPE: ICD-10-CM

## 2024-05-08 DIAGNOSIS — Z92.89 HISTORY OF EXCHANGE TRANSFUSION: ICD-10-CM

## 2024-05-08 DIAGNOSIS — D57.1 SICKLE CELL ANEMIA WITHOUT CRISIS (MULTI): ICD-10-CM

## 2024-05-08 DIAGNOSIS — Z82.61 FAMILY HISTORY OF RHEUMATOID ARTHRITIS: ICD-10-CM

## 2024-05-08 LAB
25(OH)D3 SERPL-MCNC: 10 NG/ML (ref 30–100)
ABO GROUP (TYPE) IN BLOOD: NORMAL
ACANTHOCYTES BLD QL SMEAR: NORMAL
ALBUMIN SERPL BCP-MCNC: 4.1 G/DL (ref 3.4–5)
ALP SERPL-CCNC: 95 U/L (ref 33–110)
ALT SERPL W P-5'-P-CCNC: 5 U/L (ref 7–45)
ANION GAP SERPL CALC-SCNC: 11 MMOL/L (ref 10–20)
ANTIBODY SCREEN: NORMAL
AST SERPL W P-5'-P-CCNC: 13 U/L (ref 9–39)
BASOPHILS # BLD AUTO: 0.02 X10*3/UL (ref 0–0.1)
BASOPHILS NFR BLD AUTO: 0.2 %
BILIRUB SERPL-MCNC: 2.6 MG/DL (ref 0–1.2)
BUN SERPL-MCNC: 7 MG/DL (ref 6–23)
CA-I BLD-SCNC: 1.18 MMOL/L (ref 1.1–1.33)
CALCIUM SERPL-MCNC: 9 MG/DL (ref 8.6–10.3)
CHLORIDE SERPL-SCNC: 105 MMOL/L (ref 98–107)
CO2 SERPL-SCNC: 29 MMOL/L (ref 21–32)
CREAT SERPL-MCNC: 1.05 MG/DL (ref 0.5–1.05)
EGFRCR SERPLBLD CKD-EPI 2021: 63 ML/MIN/1.73M*2
EOSINOPHIL # BLD AUTO: 0.15 X10*3/UL (ref 0–0.7)
EOSINOPHIL NFR BLD AUTO: 1.8 %
ERYTHROCYTE [DISTWIDTH] IN BLOOD BY AUTOMATED COUNT: 27.2 % (ref 11.5–14.5)
FERRITIN SERPL-MCNC: 31 NG/ML (ref 8–150)
GLUCOSE SERPL-MCNC: 106 MG/DL (ref 74–99)
HCT VFR BLD AUTO: 31.7 % (ref 36–46)
HEMOGLOBIN A2: 2.9 % (ref 2–3.5)
HEMOGLOBIN A: 23.6 % (ref 95.8–98)
HEMOGLOBIN C: 33.4 %
HEMOGLOBIN F: 0.7 % (ref 0–2)
HEMOGLOBIN IDENTIFICATION INTERPRETATION: ABNORMAL
HEMOGLOBIN S: 39.4 %
HGB BLD-MCNC: 10.8 G/DL (ref 12–16)
HGB RETIC QN: 32 PG (ref 28–38)
HYPOCHROMIA BLD QL SMEAR: NORMAL
IMM GRANULOCYTES # BLD AUTO: 0.02 X10*3/UL (ref 0–0.7)
IMM GRANULOCYTES NFR BLD AUTO: 0.2 % (ref 0–0.9)
IMMATURE RETIC FRACTION: 52.9 %
LDH SERPL L TO P-CCNC: 207 U/L (ref 84–246)
LYMPHOCYTES # BLD AUTO: 1.5 X10*3/UL (ref 1.2–4.8)
LYMPHOCYTES NFR BLD AUTO: 17.7 %
MCH RBC QN AUTO: 23.5 PG (ref 26–34)
MCHC RBC AUTO-ENTMCNC: 34.1 G/DL (ref 32–36)
MCV RBC AUTO: 69 FL (ref 80–100)
MONOCYTES # BLD AUTO: 0.57 X10*3/UL (ref 0.1–1)
MONOCYTES NFR BLD AUTO: 6.7 %
NEUTROPHILS # BLD AUTO: 6.22 X10*3/UL (ref 1.2–7.7)
NEUTROPHILS NFR BLD AUTO: 73.4 %
NRBC BLD-RTO: 0 /100 WBCS (ref 0–0)
PAPPENHEIMER BOD BLD QL SMEAR: PRESENT
PATH REVIEW-HGB IDENTIFICATION: ABNORMAL
PLATELET # BLD AUTO: 321 X10*3/UL (ref 150–450)
POTASSIUM SERPL-SCNC: 3.7 MMOL/L (ref 3.5–5.3)
PROT SERPL-MCNC: 7.4 G/DL (ref 6.4–8.2)
RBC # BLD AUTO: 4.6 X10*6/UL (ref 4–5.2)
RBC MORPH BLD: NORMAL
RETICS #: 0.01 X10*6/UL (ref 0.02–0.08)
RETICS/RBC NFR AUTO: 0.3 % (ref 0.5–2)
RH FACTOR (ANTIGEN D): NORMAL
SCHISTOCYTES BLD QL SMEAR: NORMAL
SODIUM SERPL-SCNC: 141 MMOL/L (ref 136–145)
TARGETS BLD QL SMEAR: NORMAL
WBC # BLD AUTO: 8.5 X10*3/UL (ref 4.4–11.3)

## 2024-05-08 PROCEDURE — 82330 ASSAY OF CALCIUM: CPT

## 2024-05-08 PROCEDURE — 83021 HEMOGLOBIN CHROMOTOGRAPHY: CPT

## 2024-05-08 PROCEDURE — 99214 OFFICE O/P EST MOD 30 MIN: CPT | Performed by: PEDIATRICS

## 2024-05-08 PROCEDURE — 82728 ASSAY OF FERRITIN: CPT

## 2024-05-08 PROCEDURE — 86901 BLOOD TYPING SEROLOGIC RH(D): CPT

## 2024-05-08 PROCEDURE — 83615 LACTATE (LD) (LDH) ENZYME: CPT

## 2024-05-08 PROCEDURE — 83020 HEMOGLOBIN ELECTROPHORESIS: CPT | Performed by: PATHOLOGY

## 2024-05-08 PROCEDURE — 80053 COMPREHEN METABOLIC PANEL: CPT

## 2024-05-08 PROCEDURE — 85045 AUTOMATED RETICULOCYTE COUNT: CPT

## 2024-05-08 PROCEDURE — 85025 COMPLETE CBC W/AUTO DIFF WBC: CPT

## 2024-05-08 PROCEDURE — 82306 VITAMIN D 25 HYDROXY: CPT

## 2024-05-08 PROCEDURE — 3008F BODY MASS INDEX DOCD: CPT | Performed by: PEDIATRICS

## 2024-05-08 PROCEDURE — 36415 COLL VENOUS BLD VENIPUNCTURE: CPT

## 2024-05-08 ASSESSMENT — PAIN SCALES - GENERAL: PAINLEVEL: 7

## 2024-05-09 ASSESSMENT — ENCOUNTER SYMPTOMS
SORE THROAT: 0
HEMATURIA: 0
WHEEZING: 0
HEADACHES: 0
BACK PAIN: 1
BRUISES/BLEEDS EASILY: 0
DIARRHEA: 0
SCLERAL ICTERUS: 0
LIGHT-HEADEDNESS: 0
HEMOPTYSIS: 0
NUMBNESS: 0
FLANK PAIN: 0
VOMITING: 0
NAUSEA: 0
ADENOPATHY: 0
NERVOUS/ANXIOUS: 0
FEVER: 0
ARTHRALGIAS: 1
FATIGUE: 1
DEPRESSION: 0
FREQUENCY: 0
MYALGIAS: 0
CHILLS: 0
COUGH: 0
DIAPHORESIS: 0
PALPITATIONS: 0
ABDOMINAL PAIN: 0
CONSTIPATION: 0
SHORTNESS OF BREATH: 1
LEG SWELLING: 0
EXTREMITY WEAKNESS: 0
BLOOD IN STOOL: 0
EYE PROBLEMS: 0
CHEST TIGHTNESS: 0
WOUND: 0
DYSURIA: 0

## 2024-05-09 NOTE — PROGRESS NOTES
SICKLE CELL OUTPATIENT NOTE  Patient ID: Senait Narvaez   Visit Type: Follow up visit     ASSESSMENT AND PLAN  Senait Narvaez is 54 y.o. female with     History of Hb SC SCD with chronic pain. She was previously on chronic automated red cell exchange transfusion with last transfusion on 3/1/24. This was discontinued per patient request, because of increased pain, fatigue and generally not feeling well for several days post procedure. She has been doing well since discontinuation of exchange transfusion and chronic pain is at her baseline. Her Hb is 10.8 g/dl which is around her baseline. No changes in her home analgesics   Oral tylenol 650 mg every 6 hours prn for mild to moderate pain   Oral oxycodone 10-20 mg every 4-6 hours as needed for moderate to severe and breakthrough pain   Oral duloxetine 40 mg daily has been self discontinued by patient as she did not think it helps with her pain and mood.   Non pharmacologic methods of pain control   Reviewed OARRS  Daily folic acid   We will continue to hold off red cell exchange transfusion in the interim and continue to reassess need at each encounter   Continue supplemental 2L night time oxygen     History of recurrent VTE/PE with SVC syndrome, on extended duration anticoagulation with warfarin. She denies increased bruising or bleeding   Oral warfarin 5 mg daily with target INR of 2-3      Constipation is well controlled  Continue current laxatives with colace and miralax    Follow up office visit will be in 6 weeks time     Chief Complaint: Follow up for SCD on chronic red cell exchange transfusion      Interval History: Senait is present with for follow up of SCD. Her daughter was on the phone for the encounter. She is doing well and at her baseline of health. She last had her red cell exchange transfusion on 3/1/24. Chronic pain is unchanged from prior and is well controlled with her current home oral pain regimen. Facial and neck swelling from SVC syndrome are all  unchanged from prior. She remains on warfarin as anticoagulation for recurrent VTE, which she is tolerating without increased bruising or bleeding. She denies palpitations, chest pain, chest tightness, headaches, dizziness, nausea or vomiting. She is afebrile. She is stooling well with current laxatives. She has been compliant with supplemental oxygen 2L at night. Chronic fatigue is unchanged from prior.      Review of System:   Review of Systems   Constitutional:  Positive for fatigue. Negative for chills, diaphoresis and fever.   HENT:   Negative for mouth sores, nosebleeds and sore throat.    Eyes:  Negative for eye problems and icterus.   Respiratory:  Positive for shortness of breath. Negative for chest tightness, cough, hemoptysis and wheezing.    Cardiovascular:  Negative for chest pain, leg swelling and palpitations.   Gastrointestinal:  Negative for abdominal pain, blood in stool, constipation, diarrhea, nausea and vomiting.   Genitourinary:  Negative for dysuria, frequency and hematuria.    Musculoskeletal:  Positive for arthralgias and back pain. Negative for flank pain, gait problem and myalgias.   Skin:  Negative for itching, rash and wound.   Neurological:  Negative for extremity weakness, gait problem, headaches, light-headedness and numbness.   Hematological:  Negative for adenopathy. Does not bruise/bleed easily.   Psychiatric/Behavioral:  Negative for depression. The patient is not nervous/anxious.       Allergies:   No Known Allergies    Current Medications:     Medication Documentation Review Audit       Reviewed by Erich Whaley MD (Physician) on 05/08/24 at 0848      Medication Order Taking? Sig Documenting Provider Last Dose Status   albuterol 90 mcg/actuation inhaler [id number] Yes Inhale 2 puffs every 6 hours if needed for wheezing. Shayla Kaur PA-C Taking Active   ascorbic acid (Vitamin C) 500 mg chewable tablet [id number] Yes Chew 1 tablet (500 mg) once daily. As needed Historical  Provider, MD Taking Active   Discontinued 05/08/24 0847   elderberry fruit 350 mg capsule [id number] Yes Take 1 capsule by mouth once daily. Historical Provider, MD Taking Active   fluticasone (Flonase) 50 mcg/actuation nasal spray [id number] Yes Administer 2 sprays into each nostril if needed. Historical Provider, MD Taking Active   folic acid (Folvite) 1 mg tablet [id number] Yes Take 1 tablet (1 mg) by mouth once daily. Historical Provider, MD Taking Active   furosemide (Lasix) 20 mg tablet [id number] Yes Take 1 tablet (20 mg) by mouth every other day. Historical Provider, MD Taking Active   loratadine (Claritin) 10 mg tablet [id number] Yes Take 1 tablet (10 mg) by mouth once daily as needed for allergies. Historical Provider, MD Taking Active   metoprolol tartrate (Lopressor) 25 mg tablet [id number] Yes Take 0.5 tablets (12.5 mg) by mouth 2 times a day. Shayla Kaur PA-C Taking Active   multivitamin with minerals tablet [id number] Yes Take 1 tablet by mouth once daily. Historical Provider, MD Taking Active   naloxone (Narcan) 4 mg/0.1 mL nasal spray [id number] Yes Administer 1 spray (4 mg) into affected nostril(s) if needed for opioid reversal. May repeat every 2-3 minutes if needed, alternating nostrils, until medical assistance becomes available. Britni Ely APRN-CNP Taking Active   ondansetron (Zofran) 4 mg tablet [id number] Yes Take 1 tablet (4 mg) by mouth every 8 hours if needed for nausea or vomiting. Maria De Jesus Watt APRN-CNP Taking Active   oxyCODONE (Roxicodone) 10 mg immediate release tablet [id number] Yes Take 1 tablet (10 mg) by mouth every 4 hours if needed for severe pain (7 - 10) (pain). GRISEL Moseley-CNP Taking Active   oxygen (O2) gas therapy [id number] Yes Inhale 1 each continuously. Marlene Harmon APRN-CNP Taking Active   sennosides-docusate sodium (Nita-Colace) 8.6-50 mg tablet [id number] Yes Take 2 tablets by mouth every 12 hours. Historical Provider, MD Taking  Active   warfarin (Coumadin) 5 mg tablet [id number] Yes Take 1 tablet (5 mg) by mouth once daily in the evening. GRISEL Albert-CNP Taking Active                     Past Medical History:   She has a past medical history of Asthma (HHS-HCC), CHF (congestive heart failure) (Multi), Chronic pain disorder, Compression of vein (02/19/2020), and Hypotension, unspecified (12/10/2020).    Past Surgical History:   She has a past surgical history that includes Appendectomy (08/05/2013); Cholecystectomy (08/05/2013); Other surgical history (05/13/2014); Other surgical history (10/09/2014); Other surgical history (06/09/2014); MR angio head wo IV contrast (8/16/2014); MR angio neck wo IV contrast (8/16/2014); IR CVC tunneled (3/13/2015); IR CVC tunneled (1/29/2016); IR CVC tunneled (1/17/2018); IR CVC tunneled (2/22/2018); IR CVC tunneled (3/22/2018); IR CVC tunneled (4/18/2018); IR CVC tunneled (5/16/2018); IR CVC tunneled (6/12/2018); US guided biopsy lymph node superficial (9/17/2019); CT guided percutaneous biopsy LYMPH node superficial (9/19/2019); IR CVC tunneled (1/21/2020); IR CVC tunneled (2/28/2020); IR CVC tunneled (4/10/2020); IR CVC tunneled (5/22/2020); IR CVC tunneled (6/19/2020); IR CVC tunneled (7/23/2020); IR CVC tunneled (9/4/2020); IR CVC tunneled (10/9/2020); IR CVC tunneled (11/13/2020); IR CVC tunneled (12/18/2020); IR CVC tunneled (1/22/2021); IR CVC tunneled (2/26/2021); IR CVC tunneled (4/2/2021); IR CVC tunneled (5/7/2021); IR CVC tunneled (6/11/2021); IR CVC tunneled (7/16/2021); IR CVC tunneled (8/20/2021); IR CVC tunneled (9/24/2021); IR CVC tunneled (10/29/2021); IR CVC tunneled (12/3/2021); IR CVC tunneled (1/7/2022); IR CVC tunneled (2/23/2022); IR CVC tunneled (3/25/2022); IR CVC tunneled (4/22/2022); IR CVC tunneled (6/3/2022); IR CVC tunneled (9/18/2017); IR CVC tunneled (10/30/2017); IR CVC tunneled (12/19/2017); IR CVC tunneled (1/6/2023); IR CVC tunneled (2/24/2023); IR CVC  tunneled (7/8/2022); IR CVC tunneled (8/12/2022); IR CVC tunneled (9/16/2022); CT angio neck (10/19/2022); IR CVC tunneled (10/20/2022); IR CVC tunneled (11/29/2022); IR CVC tunneled (3/31/2023); IR CVC tunneled (6/9/2023); IR CVC tunneled (7/20/2023); IR CVC tunneled (8/16/2023); IR CVC tunneled (9/21/2023); IR CVC nontunneled (10/26/2023); and IR CVC nontunneled (12/7/2023).    Family History:   Family History   Problem Relation Name Age of Onset    Diabetes Father      Gout Father      Sickle cell trait Father      Seizures Sister      Diabetes Other      Uterine cancer Other      Other (congenital blindness) Cousin         Social History:   Senait Narvaez  reports that she quit smoking about 10 years ago. Her smoking use included cigarettes. She has been exposed to tobacco smoke. She has never used smokeless tobacco.  reports that she does not currently use drugs.    EXAMINATION FINDINGS   /64 (BP Location: Right arm, Patient Position: Sitting)   Pulse 70   Temp 36.2 °C (97.2 °F) (Temporal)   Resp 16   Wt 108 kg (237 lb 1.6 oz)   SpO2 92%   BMI 40.38 kg/m²   2.21 meters squared    Physical Exam  Vitals and nursing note reviewed.   Constitutional:       General: She is not in acute distress.     Appearance: She is not toxic-appearing.   Eyes:      General:         Right eye: No discharge.      Extraocular Movements: Extraocular movements intact.      Pupils: Pupils are equal, round, and reactive to light.   Neck:      Comments: SVC syndrome with distension of her neck veins  Cardiovascular:      Rate and Rhythm: Normal rate and regular rhythm.      Pulses: Normal pulses.      Heart sounds: No murmur heard.     No gallop.   Pulmonary:      Effort: Pulmonary effort is normal. No respiratory distress.      Breath sounds: Normal breath sounds. No wheezing or rales.   Abdominal:      General: Bowel sounds are normal. There is no distension.      Tenderness: There is no abdominal tenderness.   Musculoskeletal:          General: No swelling or deformity.      Cervical back: No rigidity.      Right lower leg: No edema.      Left lower leg: No edema.   Skin:     General: Skin is warm.      Capillary Refill: Capillary refill takes less than 2 seconds.      Findings: No lesion or rash.   Neurological:      General: No focal deficit present.      Mental Status: She is alert and oriented to person, place, and time. Mental status is at baseline.      Cranial Nerves: No cranial nerve deficit.      Motor: No weakness.      Gait: Gait normal.   Psychiatric:         Mood and Affect: Mood normal.         Behavior: Behavior normal.     LABS   Lab on 05/08/2024   Component Date Value Ref Range Status    POCT Calcium, Ionized 05/08/2024 1.18  1.1 - 1.33 mmol/L Final    The performance characteristics of ionized calcium tested  in heparinized plasma or serum have been validated by the  individual  laboratory site where testing is performed.   Testing on heparinized plasma or serum is not approved by   the FDA; however, such approval is not necessary.    WBC 05/08/2024 8.5  4.4 - 11.3 x10*3/uL Final    nRBC 05/08/2024 0.0  0.0 - 0.0 /100 WBCs Final    RBC 05/08/2024 4.60  4.00 - 5.20 x10*6/uL Final    Hemoglobin 05/08/2024 10.8 (L)  12.0 - 16.0 g/dL Final    Hematocrit 05/08/2024 31.7 (L)  36.0 - 46.0 % Final    MCV 05/08/2024 69 (L)  80 - 100 fL Final    MCH 05/08/2024 23.5 (L)  26.0 - 34.0 pg Final    MCHC 05/08/2024 34.1  32.0 - 36.0 g/dL Final    RDW 05/08/2024 27.2 (H)  11.5 - 14.5 % Final    Platelets 05/08/2024 321  150 - 450 x10*3/uL Final    Neutrophils % 05/08/2024 73.4  40.0 - 80.0 % Final    Immature Granulocytes %, Automated 05/08/2024 0.2  0.0 - 0.9 % Final    Immature Granulocyte Count (IG) includes promyelocytes, myelocytes and metamyelocytes but does not include bands. Percent differential counts (%) should be interpreted in the context of the absolute cell counts (cells/UL).    Lymphocytes % 05/08/2024 17.7  13.0 - 44.0  % Final    Monocytes % 05/08/2024 6.7  2.0 - 10.0 % Final    Eosinophils % 05/08/2024 1.8  0.0 - 6.0 % Final    Basophils % 05/08/2024 0.2  0.0 - 2.0 % Final    Neutrophils Absolute 05/08/2024 6.22  1.20 - 7.70 x10*3/uL Final    Percent differential counts (%) should be interpreted in the context of the absolute cell counts (cells/uL).    Immature Granulocytes Absolute, Au* 05/08/2024 0.02  0.00 - 0.70 x10*3/uL Final    Lymphocytes Absolute 05/08/2024 1.50  1.20 - 4.80 x10*3/uL Final    Monocytes Absolute 05/08/2024 0.57  0.10 - 1.00 x10*3/uL Final    Eosinophils Absolute 05/08/2024 0.15  0.00 - 0.70 x10*3/uL Final    Basophils Absolute 05/08/2024 0.02  0.00 - 0.10 x10*3/uL Final    Automated WBC differential has been confirmed by manual smear.    Glucose 05/08/2024 106 (H)  74 - 99 mg/dL Final    Sodium 05/08/2024 141  136 - 145 mmol/L Final    Potassium 05/08/2024 3.7  3.5 - 5.3 mmol/L Final    Chloride 05/08/2024 105  98 - 107 mmol/L Final    Bicarbonate 05/08/2024 29  21 - 32 mmol/L Final    Anion Gap 05/08/2024 11  10 - 20 mmol/L Final    Urea Nitrogen 05/08/2024 7  6 - 23 mg/dL Final    Creatinine 05/08/2024 1.05  0.50 - 1.05 mg/dL Final    eGFR 05/08/2024 63  >60 mL/min/1.73m*2 Final    Calculations of estimated GFR are performed using the 2021 CKD-EPI Study Refit equation without the race variable for the IDMS-Traceable creatinine methods.  https://jasn.asnjournals.org/content/early/2021/09/22/ASN.8603141076    Calcium 05/08/2024 9.0  8.6 - 10.3 mg/dL Final    Albumin 05/08/2024 4.1  3.4 - 5.0 g/dL Final    Alkaline Phosphatase 05/08/2024 95  33 - 110 U/L Final    Total Protein 05/08/2024 7.4  6.4 - 8.2 g/dL Final    AST 05/08/2024 13  9 - 39 U/L Final    Bilirubin, Total 05/08/2024 2.6 (H)  0.0 - 1.2 mg/dL Final    ALT 05/08/2024 5 (L)  7 - 45 U/L Final    Patients treated with Sulfasalazine may generate falsely decreased results for ALT.    Ferritin 05/08/2024 31  8 - 150 ng/mL Final    Hemoglobin A  "05/08/2024 23.6 (L)  95.8 - 98.0 % Final    Hemoglobin F 05/08/2024 0.7  0.0 - 2.0 % Final    Hemoglobin S 05/08/2024 39.4 (H)  <=0.0 % Final    Hemoglobin C 05/08/2024 33.4 (H)  <=0.0 % Final    Hemoglobin A2 05/08/2024 2.9  2.0 - 3.5 % Final    Hemoglobin Identification Interpre* 05/08/2024 See Below (A)  Normal Final    Known Hemoglobin SC Disease post-transfusion     Pathologist Review-Hemoglobin Iden* 05/08/2024 Electronically signed out by Pedro Pablo Colorado MD on 5/8/24 at 4:55 PM.  By the signature on this report, the individual or group listed as making the Final Interpretation/Diagnosis certifies that they have reviewed this case.   Final    LDH 05/08/2024 207  84 - 246 U/L Final    Retic % 05/08/2024 0.3 (L)  0.5 - 2.0 % Final    Retic Absolute 05/08/2024 0.015 (L)  0.018 - 0.083 x10*6/uL Final    Reticulocyte Hemoglobin 05/08/2024 32  28 - 38 pg Final    Immature Retic fraction 05/08/2024 52.9 (H)  <=16.0 % Final    Reticulocytes are measured based on a fluorescent technique. The IRF, or immature reticulocyte fraction, is the percent of reticulocytes that show medium (MFR) or high (HFR) fluorescence.  This value can be used to assess the relative maturity of the reticulocyte population in response to anemia. The \"shift reticulocytes\" are not measured by this technique, eliminating the need for their correction in the reticulocyte index.    ABO TYPE 05/08/2024 AB   Final    Rh TYPE 05/08/2024 POS   Final    ANTIBODY SCREEN 05/08/2024 NEG   Final    Vitamin D, 25-Hydroxy, Total 05/08/2024 10 (L)  30 - 100 ng/mL Final    RBC Morphology 05/08/2024 See Below   Final    Hypochromia 05/08/2024 Mild   Final    RBC Fragments 05/08/2024 Few   Final    Target Cells 05/08/2024 Many   Final    Acanthocytes 05/08/2024 Few   Final    Pappenheimer Bodies 05/08/2024 Present   Final              Erich Whaley MD                         "

## 2024-05-13 DIAGNOSIS — E55.9 VITAMIN D DEFICIENCY: ICD-10-CM

## 2024-05-13 DIAGNOSIS — E53.8 VITAMIN B12 DEFICIENCY: Primary | ICD-10-CM

## 2024-05-13 RX ORDER — ERGOCALCIFEROL 1.25 MG/1
50000 CAPSULE ORAL
Qty: 8 CAPSULE | Refills: 0 | Status: ON HOLD | OUTPATIENT
Start: 2024-05-19 | End: 2024-07-08

## 2024-05-13 NOTE — PROGRESS NOTES
Vitamin D level deficient at 10. Will start patient on vitamin D 50,000 international units one tablet weekly x 8 weeks after completion-patient will start OTC vitamin D3 2000 IUs. Patient notified and states understanding.

## 2024-05-16 ENCOUNTER — ANTICOAGULATION - WARFARIN VISIT (OUTPATIENT)
Dept: CARDIOLOGY | Facility: CLINIC | Age: 54
End: 2024-05-16
Payer: COMMERCIAL

## 2024-05-16 DIAGNOSIS — I82.4Y9 DEEP VEIN THROMBOSIS (DVT) OF PROXIMAL LOWER EXTREMITY, UNSPECIFIED CHRONICITY, UNSPECIFIED LATERALITY (MULTI): Primary | ICD-10-CM

## 2024-05-16 DIAGNOSIS — I82.210 THROMBOSIS OF SUPERIOR VENA CAVA (MULTI): ICD-10-CM

## 2024-05-16 DIAGNOSIS — I26.99 RECURRENT PULMONARY EMBOLISM (MULTI): ICD-10-CM

## 2024-05-17 NOTE — PROGRESS NOTES
Patient identification verified with 2 identifiers.    Location: [facility] Patient Self-Testing Program [phone]    Referring Physician: TOMAS Albert   Enrollment/ Re-enrollment date: 2024   INR Goal: 2.0-3.0  INR monitoring is per [facility] protocol.  Anticoagulation Medication: warfarin  Indication: Deep Vein Thrombosis (DVT) and Pulmonary Embolism (PE)    Subjective   Bleeding signs/symptoms: No    Bruising: No   Major bleeding event: No  Thrombosis signs/symptoms: No  Thromboembolic event: No  Missed doses: No  Extra doses: No  Medication changes: No  Dietary changes: No  Change in health: No  Change in activity: No  Alcohol: No  Other concerns: No    Upcoming Procedures:  Does the Patient Have any upcoming procedures that require interruption in anticoagulation therapy? no  Does the patient require bridging? no      Anticoagulation Summary  As of 2024      INR goal:  2.0-3.0   TTR:  82.9% (5.6 mo)   INR used for dosin.50 (2024)   Weekly warfarin total:  35 mg               Assessment/Plan   Therapeutic     1. New dose: no change    2. Next INR: 2 weeks      Education provided to patient during the visit:  Patient instructed to call in interim with questions, concerns and changes.

## 2024-05-21 ENCOUNTER — TELEPHONE (OUTPATIENT)
Dept: ADMISSION | Facility: HOSPITAL | Age: 54
End: 2024-05-21
Payer: COMMERCIAL

## 2024-05-21 DIAGNOSIS — D57.00 SICKLE CELL CRISIS (MULTI): ICD-10-CM

## 2024-05-21 RX ORDER — OXYCODONE HYDROCHLORIDE 10 MG/1
10 TABLET ORAL EVERY 4 HOURS PRN
Qty: 75 TABLET | Refills: 0 | Status: SHIPPED | OUTPATIENT
Start: 2024-05-21 | End: 2024-06-04 | Stop reason: SDUPTHER

## 2024-05-21 NOTE — TELEPHONE ENCOUNTER
Pt requesting refill   Oxycodone 10mg. 1 tablet every 4 hrs prn  Pharmacy: [facility] on [address] [place]  Last FUV 5/8 with next FUV 6/19

## 2024-05-23 PROBLEM — M54.50 LOW BACK PAIN, UNSPECIFIED: Status: ACTIVE | Noted: 2023-08-12

## 2024-05-23 PROBLEM — Z86.718 PERSONAL HISTORY OF OTHER VENOUS THROMBOSIS AND EMBOLISM: Status: ACTIVE | Noted: 2022-03-08

## 2024-05-23 PROBLEM — S82.401D: Status: ACTIVE | Noted: 2022-03-08

## 2024-05-23 PROBLEM — M25.562 LEFT KNEE PAIN: Status: ACTIVE | Noted: 2023-08-12

## 2024-05-23 PROBLEM — J06.9 ACUTE UPPER RESPIRATORY INFECTION: Status: ACTIVE | Noted: 2024-02-28
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
## 2024-05-23 PROBLEM — C56.1 MALIGNANT NEOPLASM OF RIGHT OVARY (MULTI): Status: ACTIVE | Noted: 2022-03-08

## 2024-05-23 PROBLEM — N95.1 HOT FLASHES DUE TO MENOPAUSE: Status: ACTIVE | Noted: 2024-05-23

## 2024-05-23 PROBLEM — E78.5 HYPERLIPIDEMIA, UNSPECIFIED: Status: ACTIVE | Noted: 2022-03-08

## 2024-05-23 PROBLEM — N63.0 MASS OF BREAST: Status: ACTIVE | Noted: 2023-08-22

## 2024-05-23 PROBLEM — R60.0 LOCALIZED EDEMA: Status: ACTIVE | Noted: 2024-01-18

## 2024-05-23 PROBLEM — F32.A DEPRESSION, UNSPECIFIED: Status: ACTIVE | Noted: 2022-03-08

## 2024-05-23 PROBLEM — R05.9 COUGH: Status: ACTIVE | Noted: 2023-08-14

## 2024-05-23 PROBLEM — E66.01 MORBID (SEVERE) OBESITY DUE TO EXCESS CALORIES (MULTI): Status: ACTIVE | Noted: 2022-03-14

## 2024-05-23 PROBLEM — M25.529 ELBOW PAIN: Status: ACTIVE | Noted: 2023-08-12

## 2024-05-23 PROBLEM — L03.90 CELLULITIS: Status: ACTIVE | Noted: 2024-05-23

## 2024-05-23 PROBLEM — M79.89 SWELLING OF RIGHT LOWER EXTREMITY: Status: ACTIVE | Noted: 2023-09-10

## 2024-05-23 PROBLEM — Z20.822 CONTACT WITH AND (SUSPECTED) EXPOSURE TO COVID-19: Status: ACTIVE | Noted: 2023-06-19

## 2024-05-23 PROBLEM — T14.90XA BLUNT INJURY: Status: ACTIVE | Noted: 2024-05-23

## 2024-05-23 PROBLEM — R10.30 LOWER ABDOMINAL PAIN: Status: ACTIVE | Noted: 2023-08-12

## 2024-05-30 ENCOUNTER — ANTICOAGULATION - WARFARIN VISIT (OUTPATIENT)
Dept: CARDIOLOGY | Facility: CLINIC | Age: 54
End: 2024-05-30
Payer: COMMERCIAL

## 2024-05-30 DIAGNOSIS — I82.210 THROMBOSIS OF SUPERIOR VENA CAVA (MULTI): ICD-10-CM

## 2024-05-30 DIAGNOSIS — I82.4Y9 DEEP VEIN THROMBOSIS (DVT) OF PROXIMAL LOWER EXTREMITY, UNSPECIFIED CHRONICITY, UNSPECIFIED LATERALITY (MULTI): Primary | ICD-10-CM

## 2024-05-30 DIAGNOSIS — I26.99 RECURRENT PULMONARY EMBOLISM (MULTI): ICD-10-CM

## 2024-05-30 NOTE — PROGRESS NOTES
Patient identification verified with 2 identifiers.    Location: [facility] Patient Self-Testing Program [phone]    Referring Physician: TOMAS Albert   Enrollment/ Re-enrollment date: 2024   INR Goal: 2.0-3.0  INR monitoring is per [facility] protocol.  Anticoagulation Medication: warfarin  Indication: Deep Vein Thrombosis (DVT) and Pulmonary Embolism (PE)    Subjective   Bleeding signs/symptoms: No    Bruising: No   Major bleeding event: No  Thrombosis signs/symptoms: No  Thromboembolic event: No  Missed doses: No  Extra doses: No  Medication changes: No  Dietary changes: No  Change in health: No  Change in activity: No  Alcohol: No  Other concerns: No    Upcoming Procedures:  Does the Patient Have any upcoming procedures that require interruption in anticoagulation therapy? no  Does the patient require bridging? no      Anticoagulation Summary  As of 2024      INR goal:  2.0-3.0   TTR:  84.2% (6 mo)   INR used for dosin.80 (2024)   Weekly warfarin total:  35 mg               Assessment/Plan   Therapeutic     1. New dose: Spoke to patient with dosing instructions and next testing date.    2. Next INR: 2 weeks      Education provided to patient during the visit:  Patient instructed to call in interim with questions, concerns and changes.

## 2024-06-04 ENCOUNTER — TELEPHONE (OUTPATIENT)
Dept: ADMISSION | Facility: HOSPITAL | Age: 54
End: 2024-06-04
Payer: COMMERCIAL

## 2024-06-04 DIAGNOSIS — D57.00 SICKLE CELL CRISIS (MULTI): ICD-10-CM

## 2024-06-04 RX ORDER — OXYCODONE HYDROCHLORIDE 10 MG/1
10 TABLET ORAL EVERY 4 HOURS PRN
Qty: 75 TABLET | Refills: 0 | Status: ON HOLD | OUTPATIENT
Start: 2024-06-04

## 2024-06-04 NOTE — TELEPHONE ENCOUNTER
Refill request received for Oxycodone 10mg.  Preferred pharmacy is [facility] at [address] in [place].  Message sent to Sickle Cell team.

## 2024-06-10 ENCOUNTER — OFFICE VISIT (OUTPATIENT)
Dept: HEMATOLOGY/ONCOLOGY | Facility: HOSPITAL | Age: 54
End: 2024-06-10
Payer: COMMERCIAL

## 2024-06-10 ENCOUNTER — HOSPITAL ENCOUNTER (OUTPATIENT)
Dept: RADIOLOGY | Facility: HOSPITAL | Age: 54
Discharge: HOME | End: 2024-06-10
Payer: COMMERCIAL

## 2024-06-10 ENCOUNTER — TELEPHONE (OUTPATIENT)
Dept: HEMATOLOGY/ONCOLOGY | Facility: HOSPITAL | Age: 54
End: 2024-06-10

## 2024-06-10 ENCOUNTER — HOSPITAL ENCOUNTER (INPATIENT)
Facility: HOSPITAL | Age: 54
LOS: 4 days | Discharge: HOME | End: 2024-06-14
Attending: INTERNAL MEDICINE | Admitting: INTERNAL MEDICINE
Payer: COMMERCIAL

## 2024-06-10 VITALS
HEART RATE: 100 BPM | DIASTOLIC BLOOD PRESSURE: 54 MMHG | SYSTOLIC BLOOD PRESSURE: 114 MMHG | BODY MASS INDEX: 41.45 KG/M2 | TEMPERATURE: 96.8 F | RESPIRATION RATE: 20 BRPM | WEIGHT: 243.39 LBS | OXYGEN SATURATION: 88 %

## 2024-06-10 DIAGNOSIS — I27.20 PULMONARY HYPERTENSION (MULTI): ICD-10-CM

## 2024-06-10 DIAGNOSIS — D57.00 SICKLE CELL CRISIS (MULTI): Primary | ICD-10-CM

## 2024-06-10 DIAGNOSIS — D57.00 SICKLE CELL ANEMIA WITH PAIN (MULTI): Primary | ICD-10-CM

## 2024-06-10 LAB
ALBUMIN SERPL BCP-MCNC: 3.7 G/DL (ref 3.4–5)
ALP SERPL-CCNC: 115 U/L (ref 33–110)
ALT SERPL W P-5'-P-CCNC: 6 U/L (ref 7–45)
ANION GAP SERPL CALC-SCNC: 13 MMOL/L (ref 10–20)
AST SERPL W P-5'-P-CCNC: 14 U/L (ref 9–39)
BASOPHILS # BLD AUTO: 0.02 X10*3/UL (ref 0–0.1)
BASOPHILS NFR BLD AUTO: 0.2 %
BILIRUB SERPL-MCNC: 2.9 MG/DL (ref 0–1.2)
BUN SERPL-MCNC: 31 MG/DL (ref 6–23)
BURR CELLS BLD QL SMEAR: NORMAL
CALCIUM SERPL-MCNC: 8.8 MG/DL (ref 8.6–10.3)
CHLORIDE SERPL-SCNC: 101 MMOL/L (ref 98–107)
CO2 SERPL-SCNC: 26 MMOL/L (ref 21–32)
CREAT SERPL-MCNC: 1.86 MG/DL (ref 0.5–1.05)
DACRYOCYTES BLD QL SMEAR: NORMAL
EGFRCR SERPLBLD CKD-EPI 2021: 32 ML/MIN/1.73M*2
EOSINOPHIL # BLD AUTO: 0.06 X10*3/UL (ref 0–0.7)
EOSINOPHIL NFR BLD AUTO: 0.6 %
ERYTHROCYTE [DISTWIDTH] IN BLOOD BY AUTOMATED COUNT: 27.4 % (ref 11.5–14.5)
GIANT PLATELETS BLD QL SMEAR: NORMAL
GLUCOSE SERPL-MCNC: 97 MG/DL (ref 74–99)
HCT VFR BLD AUTO: 27.9 % (ref 36–46)
HGB BLD-MCNC: 9.6 G/DL (ref 12–16)
HGB RETIC QN: 33 PG (ref 28–38)
IMM GRANULOCYTES # BLD AUTO: 0.04 X10*3/UL (ref 0–0.7)
IMM GRANULOCYTES NFR BLD AUTO: 0.4 % (ref 0–0.9)
IMMATURE RETIC FRACTION: 48.5 %
INR PPP: 1.2 (ref 0.9–1.1)
LDH SERPL L TO P-CCNC: 230 U/L (ref 84–246)
LYMPHOCYTES # BLD AUTO: 1.94 X10*3/UL (ref 1.2–4.8)
LYMPHOCYTES NFR BLD AUTO: 20.3 %
MCH RBC QN AUTO: 26.4 PG (ref 26–34)
MCHC RBC AUTO-ENTMCNC: 34.4 G/DL (ref 32–36)
MCV RBC AUTO: 77 FL (ref 80–100)
MONOCYTES # BLD AUTO: 0.69 X10*3/UL (ref 0.1–1)
MONOCYTES NFR BLD AUTO: 7.2 %
NEUTROPHILS # BLD AUTO: 6.81 X10*3/UL (ref 1.2–7.7)
NEUTROPHILS NFR BLD AUTO: 71.3 %
NRBC BLD-RTO: 21.5 /100 WBCS (ref 0–0)
OVALOCYTES BLD QL SMEAR: NORMAL
PAPPENHEIMER BOD BLD QL SMEAR: PRESENT
PLATELET # BLD AUTO: 231 X10*3/UL (ref 150–450)
PLATELET CLUMP BLD QL SMEAR: PRESENT
POLYCHROMASIA BLD QL SMEAR: NORMAL
POTASSIUM SERPL-SCNC: 5 MMOL/L (ref 3.5–5.3)
PROT SERPL-MCNC: 7.2 G/DL (ref 6.4–8.2)
PROTHROMBIN TIME: 13.9 SECONDS (ref 9.8–12.8)
RBC # BLD AUTO: 3.63 X10*6/UL (ref 4–5.2)
RBC MORPH BLD: NORMAL
RETICS #: 0.06 X10*6/UL (ref 0.02–0.08)
RETICS/RBC NFR AUTO: 1.7 % (ref 0.5–2)
SCHISTOCYTES BLD QL SMEAR: NORMAL
SODIUM SERPL-SCNC: 135 MMOL/L (ref 136–145)
TARGETS BLD QL SMEAR: NORMAL
WBC # BLD AUTO: 9.6 X10*3/UL (ref 4.4–11.3)

## 2024-06-10 PROCEDURE — 2500000004 HC RX 250 GENERAL PHARMACY W/ HCPCS (ALT 636 FOR OP/ED)

## 2024-06-10 PROCEDURE — 2500000005 HC RX 250 GENERAL PHARMACY W/O HCPCS

## 2024-06-10 PROCEDURE — 83021 HEMOGLOBIN CHROMOTOGRAPHY: CPT

## 2024-06-10 PROCEDURE — 83615 LACTATE (LD) (LDH) ENZYME: CPT

## 2024-06-10 PROCEDURE — 85610 PROTHROMBIN TIME: CPT

## 2024-06-10 PROCEDURE — 36415 COLL VENOUS BLD VENIPUNCTURE: CPT

## 2024-06-10 PROCEDURE — 99417 PROLNG OP E/M EACH 15 MIN: CPT | Performed by: NURSE PRACTITIONER

## 2024-06-10 PROCEDURE — 2500000001 HC RX 250 WO HCPCS SELF ADMINISTERED DRUGS (ALT 637 FOR MEDICARE OP): Performed by: NURSE PRACTITIONER

## 2024-06-10 PROCEDURE — 99215 OFFICE O/P EST HI 40 MIN: CPT | Performed by: NURSE PRACTITIONER

## 2024-06-10 PROCEDURE — 2500000004 HC RX 250 GENERAL PHARMACY W/ HCPCS (ALT 636 FOR OP/ED): Performed by: NURSE PRACTITIONER

## 2024-06-10 PROCEDURE — 85025 COMPLETE CBC W/AUTO DIFF WBC: CPT

## 2024-06-10 PROCEDURE — 2500000005 HC RX 250 GENERAL PHARMACY W/O HCPCS: Performed by: NURSE PRACTITIONER

## 2024-06-10 PROCEDURE — 85045 AUTOMATED RETICULOCYTE COUNT: CPT

## 2024-06-10 PROCEDURE — 3008F BODY MASS INDEX DOCD: CPT | Performed by: NURSE PRACTITIONER

## 2024-06-10 PROCEDURE — 2580000001 HC RX 258 IV SOLUTIONS

## 2024-06-10 PROCEDURE — 1170000001 HC PRIVATE ONCOLOGY ROOM DAILY

## 2024-06-10 PROCEDURE — 71045 X-RAY EXAM CHEST 1 VIEW: CPT

## 2024-06-10 PROCEDURE — 84075 ASSAY ALKALINE PHOSPHATASE: CPT

## 2024-06-10 PROCEDURE — 71045 X-RAY EXAM CHEST 1 VIEW: CPT | Performed by: RADIOLOGY

## 2024-06-10 PROCEDURE — 99223 1ST HOSP IP/OBS HIGH 75: CPT

## 2024-06-10 RX ORDER — LIDOCAINE 560 MG/1
1 PATCH PERCUTANEOUS; TOPICAL; TRANSDERMAL DAILY
Status: DISCONTINUED | OUTPATIENT
Start: 2024-06-10 | End: 2024-06-14 | Stop reason: HOSPADM

## 2024-06-10 RX ORDER — HYDROMORPHONE HYDROCHLORIDE 1 MG/ML
1 INJECTION, SOLUTION INTRAMUSCULAR; INTRAVENOUS; SUBCUTANEOUS
Status: DISCONTINUED | OUTPATIENT
Start: 2024-06-10 | End: 2024-06-14 | Stop reason: HOSPADM

## 2024-06-10 RX ORDER — AMOXICILLIN 250 MG
2 CAPSULE ORAL EVERY 12 HOURS
Status: DISCONTINUED | OUTPATIENT
Start: 2024-06-10 | End: 2024-06-14 | Stop reason: HOSPADM

## 2024-06-10 RX ORDER — ONDANSETRON 4 MG/1
4 TABLET, FILM COATED ORAL EVERY 8 HOURS PRN
Status: DISCONTINUED | OUTPATIENT
Start: 2024-06-10 | End: 2024-06-10 | Stop reason: SDUPTHER

## 2024-06-10 RX ORDER — NALOXONE HYDROCHLORIDE 0.4 MG/ML
0.4 INJECTION, SOLUTION INTRAMUSCULAR; INTRAVENOUS; SUBCUTANEOUS
Status: DISCONTINUED | OUTPATIENT
Start: 2024-06-10 | End: 2024-06-10 | Stop reason: HOSPADM

## 2024-06-10 RX ORDER — NALOXONE HYDROCHLORIDE 0.4 MG/ML
0.2 INJECTION, SOLUTION INTRAMUSCULAR; INTRAVENOUS; SUBCUTANEOUS ONCE
Status: COMPLETED | OUTPATIENT
Start: 2024-06-10 | End: 2024-06-10

## 2024-06-10 RX ORDER — NALOXONE HYDROCHLORIDE 0.4 MG/ML
0.2 INJECTION, SOLUTION INTRAMUSCULAR; INTRAVENOUS; SUBCUTANEOUS AS NEEDED
Status: DISCONTINUED | OUTPATIENT
Start: 2024-06-10 | End: 2024-06-14 | Stop reason: HOSPADM

## 2024-06-10 RX ORDER — FLUTICASONE PROPIONATE 50 MCG
2 SPRAY, SUSPENSION (ML) NASAL AS NEEDED
Status: DISCONTINUED | OUTPATIENT
Start: 2024-06-10 | End: 2024-06-14 | Stop reason: HOSPADM

## 2024-06-10 RX ORDER — LORATADINE 10 MG/1
10 TABLET ORAL DAILY PRN
Status: DISCONTINUED | OUTPATIENT
Start: 2024-06-10 | End: 2024-06-14 | Stop reason: HOSPADM

## 2024-06-10 RX ORDER — MULTIVIT-MIN/IRON FUM/FOLIC AC 7.5 MG-4
1 TABLET ORAL DAILY
Status: DISCONTINUED | OUTPATIENT
Start: 2024-06-10 | End: 2024-06-14 | Stop reason: HOSPADM

## 2024-06-10 RX ORDER — POLYETHYLENE GLYCOL 3350 17 G/17G
17 POWDER, FOR SOLUTION ORAL 2 TIMES DAILY PRN
Status: DISCONTINUED | OUTPATIENT
Start: 2024-06-10 | End: 2024-06-14 | Stop reason: HOSPADM

## 2024-06-10 RX ORDER — DOCUSATE SODIUM 100 MG/1
100 CAPSULE, LIQUID FILLED ORAL 2 TIMES DAILY PRN
Status: DISCONTINUED | OUTPATIENT
Start: 2024-06-10 | End: 2024-06-14 | Stop reason: HOSPADM

## 2024-06-10 RX ORDER — ERGOCALCIFEROL 1.25 MG/1
50000 CAPSULE ORAL
Status: DISCONTINUED | OUTPATIENT
Start: 2024-06-10 | End: 2024-06-14 | Stop reason: HOSPADM

## 2024-06-10 RX ORDER — FUROSEMIDE 20 MG/1
20 TABLET ORAL EVERY OTHER DAY
Status: DISCONTINUED | OUTPATIENT
Start: 2024-06-11 | End: 2024-06-14 | Stop reason: HOSPADM

## 2024-06-10 RX ORDER — ALBUTEROL SULFATE 90 UG/1
2 AEROSOL, METERED RESPIRATORY (INHALATION) EVERY 6 HOURS PRN
Status: DISCONTINUED | OUTPATIENT
Start: 2024-06-10 | End: 2024-06-14 | Stop reason: HOSPADM

## 2024-06-10 RX ORDER — ONDANSETRON 8 MG/1
8 TABLET, ORALLY DISINTEGRATING ORAL EVERY 8 HOURS PRN
Status: DISCONTINUED | OUTPATIENT
Start: 2024-06-10 | End: 2024-06-14 | Stop reason: HOSPADM

## 2024-06-10 RX ORDER — WARFARIN SODIUM 5 MG/1
5 TABLET ORAL DAILY
Status: DISCONTINUED | OUTPATIENT
Start: 2024-06-10 | End: 2024-06-14 | Stop reason: HOSPADM

## 2024-06-10 RX ORDER — FOLIC ACID 1 MG/1
1 TABLET ORAL DAILY
Status: DISCONTINUED | OUTPATIENT
Start: 2024-06-10 | End: 2024-06-14 | Stop reason: HOSPADM

## 2024-06-10 RX ORDER — DIPHENHYDRAMINE HCL 25 MG
25 CAPSULE ORAL EVERY 6 HOURS PRN
Status: DISCONTINUED | OUTPATIENT
Start: 2024-06-10 | End: 2024-06-14 | Stop reason: HOSPADM

## 2024-06-10 RX ORDER — SENNOSIDES 8.6 MG/1
1 TABLET ORAL 2 TIMES DAILY
Status: DISCONTINUED | OUTPATIENT
Start: 2024-06-10 | End: 2024-06-10 | Stop reason: SDUPTHER

## 2024-06-10 RX ORDER — CYCLOBENZAPRINE HCL 10 MG
5 TABLET ORAL ONCE
Status: COMPLETED | OUTPATIENT
Start: 2024-06-10 | End: 2024-06-10

## 2024-06-10 RX ORDER — ACETAMINOPHEN 325 MG/1
650 TABLET ORAL ONCE
Status: COMPLETED | OUTPATIENT
Start: 2024-06-10 | End: 2024-06-10

## 2024-06-10 RX ORDER — ONDANSETRON HYDROCHLORIDE 8 MG/1
8 TABLET, FILM COATED ORAL ONCE
Status: COMPLETED | OUTPATIENT
Start: 2024-06-10 | End: 2024-06-10

## 2024-06-10 RX ORDER — DIPHENHYDRAMINE HCL 25 MG
25 CAPSULE ORAL ONCE
Status: COMPLETED | OUTPATIENT
Start: 2024-06-10 | End: 2024-06-10

## 2024-06-10 RX ORDER — ONDANSETRON HYDROCHLORIDE 2 MG/ML
4 INJECTION, SOLUTION INTRAVENOUS EVERY 8 HOURS PRN
Status: DISCONTINUED | OUTPATIENT
Start: 2024-06-10 | End: 2024-06-14 | Stop reason: HOSPADM

## 2024-06-10 RX ORDER — METOPROLOL TARTRATE 25 MG/1
12.5 TABLET, FILM COATED ORAL 2 TIMES DAILY
Status: DISCONTINUED | OUTPATIENT
Start: 2024-06-10 | End: 2024-06-14 | Stop reason: HOSPADM

## 2024-06-10 RX ADMIN — CYCLOBENZAPRINE 5 MG: 10 TABLET, FILM COATED ORAL at 10:47

## 2024-06-10 RX ADMIN — HYDROMORPHONE HYDROCHLORIDE 1 MG: 2 INJECTION, SOLUTION INTRAMUSCULAR; INTRAVENOUS; SUBCUTANEOUS at 12:57

## 2024-06-10 RX ADMIN — HYDROMORPHONE HYDROCHLORIDE 1 MG: 2 INJECTION, SOLUTION INTRAMUSCULAR; INTRAVENOUS; SUBCUTANEOUS at 14:42

## 2024-06-10 RX ADMIN — NALOXONE HYDROCHLORIDE 0.2 MG: 0.4 INJECTION, SOLUTION INTRAMUSCULAR; INTRAVENOUS; SUBCUTANEOUS at 16:03

## 2024-06-10 RX ADMIN — DEXTROSE AND SODIUM CHLORIDE 1000 ML: 5; 450 INJECTION, SOLUTION INTRAVENOUS at 16:13

## 2024-06-10 RX ADMIN — HYDROMORPHONE HYDROCHLORIDE 1 MG: 2 INJECTION, SOLUTION INTRAMUSCULAR; INTRAVENOUS; SUBCUTANEOUS at 10:47

## 2024-06-10 RX ADMIN — ACETAMINOPHEN 650 MG: 325 TABLET ORAL at 10:47

## 2024-06-10 RX ADMIN — DIPHENHYDRAMINE HYDROCHLORIDE 25 MG: 25 CAPSULE ORAL at 10:47

## 2024-06-10 RX ADMIN — Medication 3 L/MIN: at 16:30

## 2024-06-10 RX ADMIN — ONDANSETRON HYDROCHLORIDE 8 MG: 8 TABLET, FILM COATED ORAL at 10:49

## 2024-06-10 RX ADMIN — HYDROMORPHONE HYDROCHLORIDE 1 MG: 1 INJECTION, SOLUTION INTRAMUSCULAR; INTRAVENOUS; SUBCUTANEOUS at 20:58

## 2024-06-10 RX ADMIN — HYDROMORPHONE HYDROCHLORIDE 1 MG: 2 INJECTION, SOLUTION INTRAMUSCULAR; INTRAVENOUS; SUBCUTANEOUS at 11:55

## 2024-06-10 SDOH — ECONOMIC STABILITY: INCOME INSECURITY: IN THE LAST 12 MONTHS, WAS THERE A TIME WHEN YOU WERE NOT ABLE TO PAY THE MORTGAGE OR RENT ON TIME?: NO

## 2024-06-10 SDOH — ECONOMIC STABILITY: HOUSING INSECURITY
IN THE LAST 12 MONTHS, WAS THERE A TIME WHEN YOU DID NOT HAVE A STEADY PLACE TO SLEEP OR SLEPT IN A SHELTER (INCLUDING NOW)?: PATIENT DECLINED

## 2024-06-10 SDOH — SOCIAL STABILITY: SOCIAL INSECURITY: HAS ANYONE EVER THREATENED TO HURT YOUR FAMILY OR YOUR PETS?: NO

## 2024-06-10 SDOH — SOCIAL STABILITY: SOCIAL INSECURITY: DO YOU FEEL ANYONE HAS EXPLOITED OR TAKEN ADVANTAGE OF YOU FINANCIALLY OR OF YOUR PERSONAL PROPERTY?: NO

## 2024-06-10 SDOH — SOCIAL STABILITY: SOCIAL INSECURITY: ARE YOU OR HAVE YOU BEEN THREATENED OR ABUSED PHYSICALLY, EMOTIONALLY, OR SEXUALLY BY ANYONE?: NO

## 2024-06-10 SDOH — ECONOMIC STABILITY: TRANSPORTATION INSECURITY
IN THE PAST 12 MONTHS, HAS THE LACK OF TRANSPORTATION KEPT YOU FROM MEDICAL APPOINTMENTS OR FROM GETTING MEDICATIONS?: NO

## 2024-06-10 SDOH — ECONOMIC STABILITY: HOUSING INSECURITY
IN THE LAST 12 MONTHS, WAS THERE A TIME WHEN YOU DID NOT HAVE A STEADY PLACE TO SLEEP OR SLEPT IN A SHELTER (INCLUDING NOW)?: NO

## 2024-06-10 SDOH — ECONOMIC STABILITY: INCOME INSECURITY: IN THE LAST 12 MONTHS, WAS THERE A TIME WHEN YOU WERE NOT ABLE TO PAY THE MORTGAGE OR RENT ON TIME?: PATIENT DECLINED

## 2024-06-10 SDOH — ECONOMIC STABILITY: INCOME INSECURITY: HOW HARD IS IT FOR YOU TO PAY FOR THE VERY BASICS LIKE FOOD, HOUSING, MEDICAL CARE, AND HEATING?: NOT VERY HARD

## 2024-06-10 SDOH — ECONOMIC STABILITY: HOUSING INSECURITY: IN THE LAST 12 MONTHS, HOW MANY PLACES HAVE YOU LIVED?: 1

## 2024-06-10 SDOH — SOCIAL STABILITY: SOCIAL INSECURITY: DO YOU FEEL UNSAFE GOING BACK TO THE PLACE WHERE YOU ARE LIVING?: NO

## 2024-06-10 SDOH — SOCIAL STABILITY: SOCIAL INSECURITY: HAVE YOU HAD ANY THOUGHTS OF HARMING ANYONE ELSE?: NO

## 2024-06-10 SDOH — SOCIAL STABILITY: SOCIAL INSECURITY: ARE THERE ANY APPARENT SIGNS OF INJURIES/BEHAVIORS THAT COULD BE RELATED TO ABUSE/NEGLECT?: NO

## 2024-06-10 SDOH — SOCIAL STABILITY: SOCIAL INSECURITY: HAVE YOU HAD THOUGHTS OF HARMING ANYONE ELSE?: NO

## 2024-06-10 SDOH — ECONOMIC STABILITY: TRANSPORTATION INSECURITY
IN THE PAST 12 MONTHS, HAS LACK OF TRANSPORTATION KEPT YOU FROM MEETINGS, WORK, OR FROM GETTING THINGS NEEDED FOR DAILY LIVING?: NO

## 2024-06-10 SDOH — SOCIAL STABILITY: SOCIAL INSECURITY: WERE YOU ABLE TO COMPLETE ALL THE BEHAVIORAL HEALTH SCREENINGS?: YES

## 2024-06-10 SDOH — SOCIAL STABILITY: SOCIAL INSECURITY: ABUSE: ADULT

## 2024-06-10 SDOH — SOCIAL STABILITY: SOCIAL INSECURITY: DOES ANYONE TRY TO KEEP YOU FROM HAVING/CONTACTING OTHER FRIENDS OR DOING THINGS OUTSIDE YOUR HOME?: NO

## 2024-06-10 SDOH — ECONOMIC STABILITY: INCOME INSECURITY: HOW HARD IS IT FOR YOU TO PAY FOR THE VERY BASICS LIKE FOOD, HOUSING, MEDICAL CARE, AND HEATING?: PATIENT DECLINED

## 2024-06-10 ASSESSMENT — ENCOUNTER SYMPTOMS
EYES NEGATIVE: 1
ENDOCRINE NEGATIVE: 1
PSYCHIATRIC NEGATIVE: 1
ARTHRALGIAS: 1
HEMATOLOGIC/LYMPHATIC NEGATIVE: 1
CONSTITUTIONAL NEGATIVE: 1
MYALGIAS: 1
GASTROINTESTINAL NEGATIVE: 1
NEUROLOGICAL NEGATIVE: 1
ALLERGIC/IMMUNOLOGIC NEGATIVE: 1
CARDIOVASCULAR NEGATIVE: 1
COUGH: 1

## 2024-06-10 ASSESSMENT — ACTIVITIES OF DAILY LIVING (ADL)
HEARING - RIGHT EAR: FUNCTIONAL
GROOMING: INDEPENDENT
JUDGMENT_ADEQUATE_SAFELY_COMPLETE_DAILY_ACTIVITIES: YES
TOILETING: INDEPENDENT
WALKS IN HOME: INDEPENDENT
DRESSING YOURSELF: INDEPENDENT
FEEDING YOURSELF: INDEPENDENT
ADEQUATE_TO_COMPLETE_ADL: YES
LACK_OF_TRANSPORTATION: PATIENT DECLINED
PATIENT'S MEMORY ADEQUATE TO SAFELY COMPLETE DAILY ACTIVITIES?: YES
HEARING - LEFT EAR: FUNCTIONAL
BATHING: INDEPENDENT

## 2024-06-10 ASSESSMENT — LIFESTYLE VARIABLES
SKIP TO QUESTIONS 9-10: 0
HOW OFTEN DO YOU HAVE A DRINK CONTAINING ALCOHOL: PATIENT DECLINED
AUDIT-C TOTAL SCORE: -1
HOW MANY STANDARD DRINKS CONTAINING ALCOHOL DO YOU HAVE ON A TYPICAL DAY: PATIENT DECLINED
HOW OFTEN DO YOU HAVE 6 OR MORE DRINKS ON ONE OCCASION: PATIENT DECLINED
AUDIT-C TOTAL SCORE: -1

## 2024-06-10 ASSESSMENT — PAIN - FUNCTIONAL ASSESSMENT
PAIN_FUNCTIONAL_ASSESSMENT: 0-10
PAIN_FUNCTIONAL_ASSESSMENT: 0-10

## 2024-06-10 ASSESSMENT — PATIENT HEALTH QUESTIONNAIRE - PHQ9
1. LITTLE INTEREST OR PLEASURE IN DOING THINGS: NOT AT ALL
SUM OF ALL RESPONSES TO PHQ9 QUESTIONS 1 & 2: 0
2. FEELING DOWN, DEPRESSED OR HOPELESS: NOT AT ALL

## 2024-06-10 ASSESSMENT — PAIN SCALES - GENERAL
PAINLEVEL: 10-WORST PAIN EVER
PAINLEVEL_OUTOF10: 8
PAINLEVEL_OUTOF10: 3
PAINLEVEL_OUTOF10: 8

## 2024-06-10 NOTE — CARE PLAN
The patient's goals for the shift include      The clinical goals for the shift include Safety and pain management.

## 2024-06-10 NOTE — NURSING NOTE
Patient arrived from clinic and placed in bed. Patient lethargic, short shallow breaths and confused. Vitals taken and notified team. Team at bedside. Followed orders. Patient AAOX4 and talking from bed. Patient states she feels better.

## 2024-06-10 NOTE — PROGRESS NOTES
Patient ID:  Senait Narvaez is a 54 y.o. female.  Subjective   Chief Complaint: Uncontrolled Pain    HPI  Senait is a 54 y.o. 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 She is having her typical sickle cell pain all over in her joints. The pain started about 3 days ago. She has been taking her oxycodone, last time was overnight without enough relief. She has some left but wanted to be seen since it wasn't effective enough. She has a slight nonproductive cough that started last week. Pt denies chest pain, SOB, headaches, blurry vision, falls, fever or chills, n/v/d/abd pain, or urinary complaints.        ROS  Review of Systems - Oncology     Allergies  No Known Allergies     Medications  Current Outpatient Medications   Medication Instructions    albuterol 90 mcg/actuation inhaler 2 puffs, inhalation, Every 6 hours PRN    ascorbic acid (VITAMIN C) 500 mg, oral, Daily, As needed    elderberry fruit 350 mg capsule 1 capsule, oral, Daily    ergocalciferol (VITAMIN D-2) 50,000 Units, oral, Once Weekly    fluticasone (Flonase) 50 mcg/actuation nasal spray 2 sprays, Each Nostril, As needed    folic acid (FOLVITE) 1 mg, oral, Daily    furosemide (LASIX) 20 mg, oral, Every other day    loratadine (Claritin) 10 mg tablet 1 tablet, oral, Daily PRN    metoprolol tartrate (LOPRESSOR) 12.5 mg, oral, 2 times daily    multivitamin with minerals tablet 1 tablet, oral, Daily RT    naloxone (NARCAN) 4 mg, nasal, As needed, May repeat every 2-3 minutes if needed, alternating nostrils, until medical assistance becomes available.    ondansetron (ZOFRAN) 4 mg, oral, Every 8 hours PRN    oxyCODONE (ROXICODONE) 10 mg, oral, Every 4 hours PRN    oxygen (O2) gas therapy 1 each,  inhalation, Continuous    sennosides-docusate sodium (Nita-Colace) 8.6-50 mg tablet 2 tablets, oral, Every 12 hours    warfarin (COUMADIN) 5 mg, oral, Every evening        Past Medical History:   Past Medical History:  No date: Asthma (HHS-HCC)  No date: CHF (congestive heart failure) (Multi)  No date: Chronic pain disorder  02/19/2020: Compression of vein      Comment:  Superior vena cava syndrome  12/10/2020: Hypotension, unspecified   Surgical History:    Past Surgical History:   Procedure Laterality Date    APPENDECTOMY  08/05/2013    Appendectomy    CHOLECYSTECTOMY  08/05/2013    Cholecystectomy    CT ANGIO NECK  10/19/2022    CT NECK ANGIO W AND WO IV CONTRAST 10/19/2022 [facility] CLINICAL LEGACY    CT GUIDED PERCUTANEOUS BIOPSY LYMPH NODE SUPERFICIAL  9/19/2019    CT GUIDED PERCUTANEOUS BIOPSY LYMPH NODE SUPERFICIAL 9/19/2019 [facility]    IR CVC NONTUNNELED  10/26/2023    IR CVC NONTUNNELED 10/26/2023 [facility] ANGIO    IR CVC NONTUNNELED  12/7/2023    IR CVC NONTUNNELED 12/7/2023 Marcos Moser MD [facility] ANGIO    IR CVC TUNNELED  3/13/2015    IR CVC TUNNELED 3/13/2015 [facility] CLINICAL LEGACY    IR CVC TUNNELED  1/29/2016    IR CVC TUNNELED 1/29/2016 [facility]    IR CVC TUNNELED  1/17/2018    IR CVC TUNNELED 1/17/2018 [facility]    IR CVC TUNNELED  2/22/2018    IR CVC TUNNELED 2/22/2018 [facility]    IR CVC TUNNELED  3/22/2018    IR CVC TUNNELED 3/22/2018 [facility] CLINICAL LEGACY    IR CVC TUNNELED  4/18/2018    IR CVC TUNNELED 4/18/2018 [facility]    IR CVC TUNNELED  5/16/2018    IR CVC TUNNELED 5/16/2018 [facility]    IR CVC TUNNELED  6/12/2018    IR CVC TUNNELED 6/12/2018 [facility]    IR CVC TUNNELED  1/21/2020    IR CVC TUNNELED 1/21/2020 [facility] INPATIENT LEGACY    IR CVC TUNNELED  2/28/2020    IR CVC TUNNELED 2/28/2020 [facility]CILLARY LEGACY    IR CVC TUNNELED  4/10/2020    IR CVC TUNNELED 4/10/2020 [facility] AIB LEGACY    IR CVC TUNNELED  5/22/2020    IR CVC TUNNELED 5/22/2020 [facility]CILLARY  LEGACY    IR CVC TUNNELED  6/19/2020    IR CVC TUNNELED 6/19/2020 [facility] AIB LEGACY    IR CVC TUNNELED  7/23/2020    IR CVC TUNNELED 7/23/2020 [facility] AIB LEGACY    IR CVC TUNNELED  9/4/2020    IR CVC TUNNELED 9/4/2020 [facility] AIB LEGACY    IR CVC TUNNELED  10/9/2020    IR CVC TUNNELED 10/9/2020 [facility] ANCILLARY LEGACY    IR CVC TUNNELED  11/13/2020    IR CVC TUNNELED 11/13/2020 [facility] AIB LEGACY    IR CVC TUNNELED  12/18/2020    IR CVC TUNNELED 12/18/2020 [facility] AIB LEGACY    IR CVC TUNNELED  1/22/2021    IR CVC TUNNELED 1/22/2021 [facility] AIB LEGACY    IR CVC TUNNELED  2/26/2021    IR CVC TUNNELED 2/26/2021 [facility] CLINICAL LEGACY    IR CVC TUNNELED  4/2/2021    IR CVC TUNNELED 4/2/2021 [facility] AIB LEGACY    IR CVC TUNNELED  5/7/2021    IR CVC TUNNELED 5/7/2021 [facility] ANCILLARY LEGACY    IR CVC TUNNELED  6/11/2021    IR CVC TUNNELED 6/11/2021 [facility] AIB LEGACY    IR CVC TUNNELED  7/16/2021    IR CVC TUNNELED 7/16/2021 [facility] ANCILLARY LEGACY    IR CVC TUNNELED  8/20/2021    IR CVC TUNNELED 8/20/2021 [facility] AIB LEGACY    IR CVC TUNNELED  9/24/2021    IR CVC TUNNELED 9/24/2021 [facility] AIB LEGACY    IR CVC TUNNELED  10/29/2021    IR CVC TUNNELED 10/29/2021 [facility] AIB LEGACY    IR CVC TUNNELED  12/3/2021    IR CVC TUNNELED 12/3/2021 [facility] AIB LEGACY    IR CVC TUNNELED  1/7/2022    IR CVC TUNNELED 1/7/2022 [facility] ANCILLARY LEGACY    IR CVC TUNNELED  2/23/2022    IR CVC TUNNELED 2/23/2022 [facility] CLINICAL LEGACY    IR CVC TUNNELED  3/25/2022    IR CVC TUNNELED 3/25/2022 [facility] ANCILLARY LEGACY    IR CVC TUNNELED  4/22/2022    IR CVC TUNNELED 4/22/2022 [facility] ANCILLARY LEGACY    IR CVC TUNNELED  6/3/2022    IR CVC TUNNELED 6/3/2022 [facility] ANCILLARY LEGACY    IR CVC TUNNELED  9/18/2017    IR CVC TUNNELED 9/18/2017 [facility] AIB LEGACY    IR CVC TUNNELED  10/30/2017    IR CVC TUNNELED 10/30/2017 [facility] AIB LEGACY    IR CVC TUNNELED  12/19/2017    IR CVC TUNNELED 12/19/2017 [facility] AIB LEGACY    IR CVC TUNNELED  1/6/2023    IR CVC TUNNELED 1/6/2023 [facility] LEGACY    IR CVC TUNNELED  2/24/2023     IR CVC TUNNELED [facility] ANGIO    IR CVC TUNNELED  7/8/2022    IR CVC TUNNELED 7/8/2022 [facility] ANCILLARY LEGACY    IR CVC TUNNELED  8/12/2022    IR CVC TUNNELED 8/12/2022 [facility] CLINICAL LEGACY    IR CVC TUNNELED  9/16/2022    IR CVC TUNNELED 9/16/2022 [facility] ANCILLARY LEGACY    IR CVC TUNNELED  10/20/2022    IR CVC TUNNELED 10/20/2022 [facility] CLINICAL LEGACY    IR CVC TUNNELED  11/29/2022    IR CVC TUNNELED 11/29/2022 [facility] ANCILLARY LEGACY    IR CVC TUNNELED  3/31/2023    IR CVC TUNNELED [facility] ANGIO    IR CVC TUNNELED  6/9/2023    IR CVC TUNNELED 6/9/2023 [facility] ANGIO    IR CVC TUNNELED  7/20/2023    IR CVC TUNNELED 7/20/2023 [facility] ANGIO    IR CVC TUNNELED  8/16/2023    IR CVC TUNNELED 8/16/2023 [facility] ANGIO    IR CVC TUNNELED  9/21/2023    IR CVC TUNNELED 9/21/2023 [facility] ANGIO    MR HEAD ANGIO WO IV CONTRAST  8/16/2014    MR HEAD ANGIO WO IV CONTRAST 8/16/2014 [facility] ANCILLARY LEGACY    MR NECK ANGIO WO IV CONTRAST  8/16/2014    MR NECK ANGIO WO IV CONTRAST 8/16/2014 [facility] [place]LL[facility]GAC[facility]    OTHER SURGICAL HISTORY  05/13/2014    Fluid Drained, Retina Inspected: Breaks Supported By Buckle    OTHER SURGICAL HISTORY  10/09/2014    Closed Treatment Of Fracture Of The Lateral Malleolus    OTHER SURGICAL HISTORY  06/09/2014    Salpingo-oophorectomy Right Side    US GUIDED BIOPSY LYMPH NODE SUPERFICIAL  9/17/2019    US GUIDED BIOPSY LYMPH NODE SUPERFICIAL 9/17/2019 [facility] [place]      Family History:    Family History   Problem Relation Name Age of Onset    Diabetes Father      Gout Father      Sickle cell trait Father      Seizures Sister      Diabetes Other      Uterine cancer Other      Other (congenital blindness) Cousin       Family Oncology History:    Cancer-related family history includes Uterine cancer in an other family member.  Social History:    Social History     Tobacco Use    Smoking status: Former     Current packs/day: 0.00     Types: Cigarettes     Quit date: 2014     Years since quitting: 10.4     Passive exposure: Past     Smokeless tobacco: Never   Substance Use Topics    Alcohol use: Not Currently    Drug use: Not Currently        Objective   Vitals: There were no vitals taken for this visit.  Weight:   There were no vitals filed for this visit.      Physical Exam  Vitals reviewed.   Constitutional:       Appearance: Normal appearance.   HENT:      Head: Normocephalic and atraumatic.      Nose: Nose normal.      Mouth/Throat:      Mouth: Mucous membranes are moist.      Pharynx: Oropharynx is clear.   Eyes:      Conjunctiva/sclera: Conjunctivae normal.      Pupils: Pupils are equal, round, and reactive to light.   Cardiovascular:      Rate and Rhythm: Normal rate and regular rhythm.      Pulses: Normal pulses.      Heart sounds: Normal heart sounds.   Pulmonary:      Effort: Pulmonary effort is normal.      Breath sounds: Normal breath sounds.   Abdominal:      General: Abdomen is flat. Bowel sounds are normal. There is no distension.      Palpations: Abdomen is soft.      Tenderness: There is no abdominal tenderness.   Musculoskeletal:         General: No swelling.      Cervical back: Normal range of motion and neck supple.   Skin:     General: Skin is warm and dry.   Neurological:      General: No focal deficit present.      Mental Status: She is alert and oriented to person, place, and time.   Psychiatric:         Behavior: Behavior normal.         Diagnostic Results     Labs                                   Lab Results   Component Value Date    HGB 10.8 (L) 05/08/2024    HGB 9.4 (L) 03/02/2024    HGB 9.9 (L) 03/01/2024     05/08/2024     03/02/2024     03/01/2024     05/08/2024     03/01/2024     02/29/2024       Images  === 02/27/24 ===    XR CHEST 2 VIEWS (Wet Read)  This result has not been signed. Information might be incomplete.    - Impression -  1. No acute cardiopulmonary process.  2. Persistent blunting of the left costophrenic angle with associated  streaky, linear opacities,  likely representing subsegmental  atelectasis versus scarring.    I personally reviewed the images/study, and I agree with the findings  as stated above. This study was interpreted at [facility], [place], [place].    MACRO:  None      Dictation workstation:   [id number]     Transthoracic Echo (TTE) Complete    Result Date: 1/23/2024   [facility], [address] [place] [address]                Tel [phone] and Fax [phone] TRANSTHORACIC ECHOCARDIOGRAM REPORT  Patient Name:     KIRSTY Saenz Physician:  [id number] Ag Houston MD Study Date:       1/23/2024           Ordering Provider:  [id number] PENG REARDON MRN/PID:          [id number]            Fellow: Accession#:       [id number]        Nurse: Date of           1970 / 53      Sonographer:        Sona Jiménez RDCS Birth/Age:        years Gender:           F                   Additional Staff: Height:           165.10 cm           Admit Date:         1/8/2024 Weight:           107.50 kg           Admission Status:   Inpatient - Routine BSA:              2.13 m2             Encounter#:         [id number]                                       [place] [address]                                       Location: Blood Pressure: 110 /46 mmHg Study Type:    TRANSTHORACIC ECHO (TTE) COMPLETE Diagnosis/ICD: Sickle cell disease without crisis-D57.1 Indication:    Sickle cell disease without crisis; Thrombosis of superior vena                cava CPT Code:      Echo Complete w Full [id number] Patient History: Pertinent History: Previous DVT, Cardiomyopathy, Chest Pain and PE. Sickle cell                    disease; Pulm HTN; CHARLES. Study Detail: The following Echo studies were performed: 2D, M-Mode, Doppler and               color flow. Technically challenging study due to body habitus and               poor acoustic  windows. Definity used as a contrast agent for               endocardial border definition. Total contrast used for this               procedure was 4 mL via IV push.  PHYSICIAN INTERPRETATION: Left Ventricle: The left ventricular systolic function is normal, with an estimated ejection fraction of 60-65%. There are no regional wall motion abnormalities. The left ventricular cavity size is normal. Spectral Doppler shows a normal pattern of left ventricular diastolic filling. Left Atrium: The left atrium is upper limits of normal in size. Right Ventricle: The right ventricle is moderately enlarged. There is reduced right ventricular systolic function. Right Atrium: The right atrium was not well visualized. Aortic Valve: The aortic valve is probably trileaflet. There is no evidence of aortic valve regurgitation. The peak instantaneous gradient of the aortic valve is 12.1 mmHg. The mean gradient of the aortic valve is 4.2 mmHg. Mitral Valve: The mitral valve is normal in structure. There is no evidence of mitral valve regurgitation. Tricuspid Valve: The tricuspid valve was not well visualized. There is trace tricuspid regurgitation. The right ventricular systolic pressure is unable to be estimated. Pulmonic Valve: The pulmonic valve is not well visualized. There is physiologic pulmonic valve regurgitation. Pericardium: There is no pericardial effusion noted. Aorta: The aortic root was not well visualized. Systemic Veins: The inferior vena cava appears dilated. There is less than 50% IVC collapse with inspiration. In comparison to the previous echocardiogram(s): Compared with study from 6/29/2023, the imaging windows are more challenging on the current examination but the right ventricle appears larger and less dynamic.  CONCLUSIONS:  1. Poorly visualized anatomical structures due to suboptimal image quality.  2. Left ventricular systolic function is normal with a 60-65% estimated ejection fraction.  3. There is reduced  right ventricular systolic function.  4. Moderately enlarged right ventricle. QUANTITATIVE DATA SUMMARY: 2D MEASUREMENTS:                          Normal Ranges: IVSd:          0.71 cm   (0.6-1.1cm) LVPWd:         0.67 cm   (0.6-1.1cm) LVIDd:         4.70 cm   (3.9-5.9cm) LVIDs:         3.08 cm LV Mass Index: 47.5 g/m2 LV % FS        34.6 % LA VOLUME:                               Normal Ranges: LA Vol A4C:        76.9 ml    (22+/-6mL/m2) LA Vol A2C:        65.4 ml LA Vol BP:         73.4 ml LA Vol Index A4C:  36.1ml/m2 LA Vol Index A2C:  30.7 ml/m2 LA Vol Index BP:   34.5 ml/m2 LA Area A4C:       22.3 cm2 LA Area A2C:       21.3 cm2 LA Major Axis A4C: 5.5 cm LA Major Axis A2C: 5.9 cm RA VOLUME BY A/L METHOD:                               Normal Ranges: RA Vol A4C:        51.4 ml    (8.3-19.5ml) RA Vol Index A4C:  24.2 ml/m2 RA Area A4C:       17.9 cm2 RA Major Axis A4C: 5.3 cm M-MODE MEASUREMENTS:                  Normal Ranges: Ao Root: 2.90 cm (2.0-3.7cm) LAs:     3.38 cm (2.7-4.0cm) LV DIASTOLIC FUNCTION:                               Normal Ranges: MV Peak E:        0.78 m/s    (0.7-1.2 m/s) MV Peak A:        0.65 m/s    (0.42-0.7 m/s) E/A Ratio:        1.19        (1.0-2.2) MV e'             0.12 m/s    (>8.0) MV lateral e'     0.13 m/s MV medial e'      0.11 m/s MV A Dur:         156.04 msec E/e' Ratio:       6.51        (<8.0) PulmV Sys Jeremy:    34.04 cm/s PulmV Jose Jeremy:   30.46 cm/s PulmV S/D Jeremy:    1.12 PulmV A Revs Jeremy: 25.89 cm/s PulmV A Revs Dur: 117.98 msec MITRAL VALVE:                 Normal Ranges: MV DT: 304 msec (150-240msec) AORTIC VALVE:                                    Normal Ranges: AoV Vmax:                1.74 m/s  (<=1.7m/s) AoV Peak P.1 mmHg (<20mmHg) AoV Mean P.2 mmHg  (1.7-11.5mmHg) LVOT Max Jeremy:            1.46 m/s  (<=1.1m/s) AoV VTI:                 29.52 cm  (18-25cm) LVOT VTI:                30.61 cm LVOT Diameter:           2.08 cm   (1.8-2.4cm)  AoV Area, VTI:           3.53 cm2  (2.5-5.5cm2) AoV Area,Vmax:           2.86 cm2  (2.5-4.5cm2) AoV Dimensionless Index: 1.04  RIGHT VENTRICLE: TAPSE: 27.0 mm RV s'  0.15 m/s TRICUSPID VALVE/RVSP:                   Normal Ranges: IVC Diam: 2.10 cm PULMONIC VALVE:                         Normal Ranges: PV Accel Time: 148 msec (>120ms) PV Max Jeremy:    0.7 m/s  (0.6-0.9m/s) PV Max P.8 mmHg Pulmonary Veins: PulmV A Revs Dur: 117.98 msec PulmV A Revs Jeremy: 25.89 cm/s PulmV Jose Jeremy:   30.46 cm/s PulmV S/D Jeremy:    1.12 PulmV Sys Jeremy:    34.04 cm/s  [id number] Ag Houston MD Electronically signed on 2024 at 7:41:34 PM  ** Final **         Assessment/Plan   Senait Narvaez is a 54 y.o. 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      [facility] Course  - VSS  -  hemolysis labs near baseline, mild CHARLES  - difficulty getting IV access, IV team to attempt, will give IVF for CHARLES if successful  - dilaudid 1 mg Q 1 hour x 3 doses per care plan given for sickle cell related pain then 1mg q2h as needed until bed is available  - Zofran 8mg once for opioid induced nausea/vomiting  - Benadryl 25mg once for opioid induced pruritus   - CXR for c/o cough without acute findings, no concern for acute chest    Disposition  - direct admit for intractable pain, accepted by Dr. Cr  - sickle cell team updated on plan for admission.             Rebeca Riojas, GRISEL-CNP

## 2024-06-10 NOTE — SIGNIFICANT EVENT
RAPID RESPONSE RN NOTE - SONIA 7   06/10/24 1555   Onset Documentation   Rapid Response Initiated By Radar auto page   Location/Room [facility]  ([facility] 5012)   Pager Time 1550   Arrival Time 1555   Event End Time 1605   Level II Called No   Primary Reason for Call Radar auto page  (RADAR 7)     VS: 35, 95, 79/59, 84%.  Upon arrival to room patient (sickle cell) found sitting up in bed, awake- Nursing staff and primary team TATI Pavon at bedside. VS retaken and charted.  Per Staff the patient just arrived as direct [facility]- patient received total of 3 mg PO Dilaudid.  Narcan 0.2 mg IV push administered as ordered- patient now more alert and conversant.  RN to contact Rapid Response Team with any further concerns or patient deterioration.

## 2024-06-10 NOTE — PROGRESS NOTES
Pharmacy Medication History Review    Senait Narvaez is a 54 y.o. female admitted for Sickle cell anemia with pain (Multi). Pharmacy reviewed the patient's [id number] medications and allergies for accuracy.    The list below reflects the updated PTA list.   Comments regarding how patient may be taking medications differently can be found in the Admit Orders Activity  Prior to Admission Medications   Prescriptions Last Dose Informant Patient Reported?   albuterol 90 mcg/actuation inhaler Unknown Self No   Sig: Inhale 2 puffs every 6 hours if needed for wheezing.   ascorbic acid (Vitamin C) 500 mg chewable tablet Past Week Self Yes   Sig: Chew 1 tablet (500 mg) once daily. As needed   elderberry fruit 350 mg capsule Past Week Self Yes   Sig: Take 1 capsule by mouth once daily.   ergocalciferol (Vitamin D-2) 1.25 MG (12769 UT) capsule New Rx Self No   Sig: Take 1 capsule (50,000 Units) by mouth 1 (one) time per week for 8 doses.   fluticasone (Flonase) 50 mcg/actuation nasal spray More than a month Self Yes   Sig: Administer 2 sprays into each nostril if needed.   folic acid (Folvite) 1 mg tablet 6/10/2024 Self Yes   Sig: Take 1 tablet (1 mg) by mouth once daily.   furosemide (Lasix) 20 mg tablet 6/9/2024 Self Yes   Sig: Take 1 tablet (20 mg) by mouth every other day.   loratadine (Claritin) 10 mg tablet Past Week Self Yes   Sig: Take 1 tablet (10 mg) by mouth once daily as needed for allergies.   metoprolol tartrate (Lopressor) 25 mg tablet 6/10/2024 Self No   Sig: Take 0.5 tablets (12.5 mg) by mouth 2 times a day.   multivitamin with minerals tablet Unknown Self Yes   Sig: Take 1 tablet by mouth once daily.   naloxone (Narcan) 4 mg/0.1 mL nasal spray Unknown Self No   Sig: Administer 1 spray (4 mg) into affected nostril(s) if needed for opioid reversal. May repeat every 2-3 minutes if needed, alternating nostrils, until medical assistance becomes available.   ondansetron (Zofran) 4 mg tablet Unknown Self No  "  Sig: Take 1 tablet (4 mg) by mouth every 8 hours if needed for nausea or vomiting.   oxyCODONE (Roxicodone) 10 mg immediate release tablet 6/9/2024 Self No   Sig: Take 1 tablet (10 mg) by mouth every 4 hours if needed for severe pain (7 - 10) (pain).   oxygen (O2) gas therapy  Self No   Sig: Inhale 1 each continuously.   sennosides-docusate sodium (Nita-Colace) 8.6-50 mg tablet Past Month Self Yes   Sig: Take 2 tablets by mouth every 12 hours.   warfarin (Coumadin) 5 mg tablet 6/9/2024 at 1800 Self No   Sig: Take 1 tablet (5 mg) by mouth once daily in the evening.      Facility-Administered Medications: None        The list below reflects the updated allergy list. Please review each documented allergy for additional clarification and justification.  Allergies  Reviewed by William Solano RPh on 6/10/2024   No Known Allergies         Patient accepts M2B at discharge.   [facility]     Sources:   Pt interview - knows medications well  Dispense hx   OARRS   05/30/24 anticoagulation RN note    Additional Comments:  Warfarin 5 mg tablets at home   5 mg daily at 1800  Managed outpatient by  anticoagulation RNs      William Solano PharmD  Transitions of Care Pharmacist  06/10/24     Secure Chat preferred   If no response call [phone] or Vocera \"Med Rec\"   "

## 2024-06-10 NOTE — H&P
History Of Present Illness  Senait Narvaez is a 54 y.o. female presenting with Senait Narvaez is a 53 y.o. 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     Patient presented to the [facility] clinic this morning with c/o severe generalized pain located in her joints. She states her pain started about 3 days ago, and she tried to take her home oxycodone with minimal relief. She does also endorse a nonproductive cough that started last week. Denies recent sick contacts. Denies chest pain, SOB, N/V/D/C, fever, chills, abdominal pain, bleeding, dizziness.     ROS: A complete review of systems was performed and is negative except for as mentioned above in the HPI.    [facility] course:  Vitals: none recorded   Imaging: CXR w/out acute finding, no concern for ACS  Labs: WBC 9.6, hgb 9.6 (baseline~9) tbili 2.9 (baseline~1.5), Cr. 1.86 (baseline~1-1.50) Alk Phos 115, ALT 6  Medications: IV Dilaudid 1mg x4, PO zofran 8mg x1, Po Benadryl 25mg x1      Past Medical History  She has a past medical history of Asthma ([facility]-H[facility]), CHF (congestive heart failure) (Multi), Chronic pain disorder, Compression of vein (02/19/2020), and Hypotension, unspecified (12/10/2020).    Surgical History  She has a past surgical history that includes Appendectomy (08/05/2013); Cholecystectomy (08/05/2013); Other surgical history (05/13/2014); Other surgical history (10/09/2014); Other surgical history (06/09/2014); MR angio head wo IV contrast (8/16/2014); MR angio neck wo IV contrast (8/16/2014); IR CVC tunneled (3/13/2015); IR CVC tunneled (1/29/2016); IR CVC tunneled (1/17/2018); IR CVC tunneled (2/22/2018); IR CVC tunneled (3/22/2018); IR CVC tunneled (4/18/2018); IR CVC tunneled  (5/16/2018); IR CVC tunneled (6/12/2018); US guided biopsy lymph node superficial (9/17/2019); CT guided percutaneous biopsy LYMPH node superficial (9/19/2019); IR CVC tunneled (1/21/2020); IR CVC tunneled (2/28/2020); IR CVC tunneled (4/10/2020); IR CVC tunneled (5/22/2020); IR CVC tunneled (6/19/2020); IR CVC tunneled (7/23/2020); IR CVC tunneled (9/4/2020); IR CVC tunneled (10/9/2020); IR CVC tunneled (11/13/2020); IR CVC tunneled (12/18/2020); IR CVC tunneled (1/22/2021); IR CVC tunneled (2/26/2021); IR CVC tunneled (4/2/2021); IR CVC tunneled (5/7/2021); IR CVC tunneled (6/11/2021); IR CVC tunneled (7/16/2021); IR CVC tunneled (8/20/2021); IR CVC tunneled (9/24/2021); IR CVC tunneled (10/29/2021); IR CVC tunneled (12/3/2021); IR CVC tunneled (1/7/2022); IR CVC tunneled (2/23/2022); IR CVC tunneled (3/25/2022); IR CVC tunneled (4/22/2022); IR CVC tunneled (6/3/2022); IR CVC tunneled (9/18/2017); IR CVC tunneled (10/30/2017); IR CVC tunneled (12/19/2017); IR CVC tunneled (1/6/2023); IR CVC tunneled (2/24/2023); IR CVC tunneled (7/8/2022); IR CVC tunneled (8/12/2022); IR CVC tunneled (9/16/2022); CT angio neck (10/19/2022); IR CVC tunneled (10/20/2022); IR CVC tunneled (11/29/2022); IR CVC tunneled (3/31/2023); IR CVC tunneled (6/9/2023); IR CVC tunneled (7/20/2023); IR CVC tunneled (8/16/2023); IR CVC tunneled (9/21/2023); IR CVC nontunneled (10/26/2023); and IR CVC nontunneled (12/7/2023).    Oncology History    No history exists.        Social History  She reports that she quit smoking about 10 years ago. Her smoking use included cigarettes. She has been exposed to tobacco smoke. She has never used smokeless tobacco. She reports that she does not currently use alcohol. She reports that she does not currently use drugs.     Allergies  Patient has no known allergies.    Review of Systems   Constitutional: Negative.    HENT: Negative.     Eyes: Negative.    Respiratory:  Positive for cough.    Cardiovascular:  Negative.    Gastrointestinal: Negative.    Endocrine: Negative.    Genitourinary: Negative.    Musculoskeletal:  Positive for arthralgias and myalgias.   Skin: Negative.    Allergic/Immunologic: Negative.    Neurological: Negative.    Hematological: Negative.    Psychiatric/Behavioral: Negative.          Physical Exam  Vitals reviewed.   Constitutional:       Appearance: Normal appearance. She is obese.   HENT:      Head: Normocephalic and atraumatic.      Nose: Nose normal.      Mouth/Throat:      Mouth: Mucous membranes are moist.      Pharynx: Oropharynx is clear.   Eyes:      Extraocular Movements: Extraocular movements intact.      Pupils: Pupils are equal, round, and reactive to light.   Cardiovascular:      Rate and Rhythm: Normal rate and regular rhythm.      Pulses: Normal pulses.      Heart sounds: Normal heart sounds.   Pulmonary:      Effort: Pulmonary effort is normal.      Breath sounds: Normal breath sounds.   Abdominal:      General: Bowel sounds are normal.      Palpations: Abdomen is soft.   Musculoskeletal:         General: Swelling (facial) present. Normal range of motion.   Skin:     General: Skin is warm.   Neurological:      General: No focal deficit present.      Mental Status: She is alert and oriented to person, place, and time. Mental status is at baseline.   Psychiatric:         Mood and Affect: Mood normal.         Behavior: Behavior normal.          Last Recorded Vitals  Blood pressure 93/60, pulse 86, temperature 35.8 °C (96.4 °F), resp. rate 18, SpO2 99%.    Relevant Results  Scheduled medications  ascorbic acid, 500 mg, oral, Daily  dextrose 5 % and sodium chloride 0.45 % bolus, 1,000 mL, intravenous, Once  ergocalciferol, 50,000 Units, oral, Every Sunday  folic acid, 1 mg, oral, Daily  [START ON 6/11/2024] furosemide, 20 mg, oral, Every other day  lidocaine, 1 patch, transdermal, Daily  multivitamin with minerals, 1 tablet, oral, Daily  sennosides-docusate sodium, 2 tablet, oral,  q12h  warfarin, 5 mg, oral, Daily      Continuous medications  oxygen, , Last Rate: 3 L/min (06/10/24 1630)      PRN medications  PRN medications: albuterol, diphenhydrAMINE, docusate sodium **OR** polyethylene glycol, fluticasone, HYDROmorphone, loratadine, ondansetron ODT **OR** ondansetron      Assessment/Plan   Principal Problem:    Sickle cell anemia with pain (Multi)  Senait Narvaez is a 53 year-old female with a PMHx of Hgb SC disease (previously on exchange transfusions q4-6 weeks, last transfusion 3/1/24), hx of SVC syndrome, 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  - Previously managed through routine RBC exchanges q6 weeks, most recent RBCEx 3/1/24, per patient pausing on transfusions for time being  - OARRS reviewed, no aberrant behavior noted (Last refilled oxy 10mg tab qty 75, x12 days on 6/4/24)   - No leukocytosis on admit, afebrile, no s/s infectious on admit   - Hemolysis labs at baseline, Hbg baseline ~8-11; Hbg 9.6 on admit (6/10)  - CXR w/o evidence of acute process, atelectasis vs scarring (6/10)  - Care plan from 12/6/23- For mild to moderate pain: Dilaudid 0.5mg IVP q3h as needed, for moderate to severe pain Dilaudid 1- 1.5mg q3h PRN  - On arrival to [facility], naloxone 0.2mg administered for hypotension, hypoxia and drowsiness (6/10)  - For pain management - started on IV dilaudid 1 mg q3h PRN on admit (6/10- )  - bowel regimen for opioid induced constipation, zofran for opioid induced nausea, and benadrly for opioid induced pruritus; [facility] 2/28  - c/w home Duloxetine 40mg daily, PO Zofran PRN, Folic Acid 1mg daily, and MVI daily     # pHTN   - Initial c/f CTEPH as imaging findings with dilated PA, filling defects, and mosaicism  - VQ scan (6/13) with non anatomical  basal defects and RUL defect due to scar--> not favoring CTEPH per Dr. Yi and Dr. 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 (PFT to reevaluate for obstruction and small airway disease), and sleep apnea    - c/w home 2 L via CPAP at night   - Follows with pulmonology outpt     # Hx SVT  - Baseline admission EKG pending on admit   - ECHO (01/20/24) EF 60-65%  - c/w home Metoprolol Succs 12.5mg daily and 20mg lasix every other day   - Follows with Cardiology outpt, 2/13 most recent OP visit, tolerating all meds including lasix 20 mg every other day, metop tartrate 12.5mg BID to control SVT     # DVT/ PE/ SVC Thrombus  - Hx SVC stricture s/p SVC angioplasty  - B/l Upper Extremity and Facial Swelling d/t partial filling defect within the SVC and a thrombus of the SVC complicated by bilateral Mediport catheters. On lifelong anticoagulation, DOAC discouraged due to high risk of clotting   - Cont w/ home Coumadin 5mg daily  - Monitors INR with at home Coagcheck device  - Follows with Coumadin clinic outpatient  - Caution with fluids      # CKD II  # CHARLES  - Baseline ~ SCr <2, 1.86 on admission (6/10)  - Thought to be related to hypotension mediated by metoprolol (hx of SVT) and furosemide (hx of heart failure)  - Follows with nephrology outpt - most recent appointment 1/11/24, check labs q6 mo, FUV in 1yr Nephrology   - pt c/o BL flank pain on admission (6/10), 1L D5 1/2NS bolus ordered  - BL renal US ordered (6/10), pending     # CAN  - cont home CPAP   - cont home Albuterol PRN     # H/O Cauda Equine syndrome  - Follows Dr. Delacruz as outpatient    #Misc  - cont home Vit C 500mg, Vit D 50,000 weekly (Sunday), multivitamin daily  - cont home loratadine 10mg daily PRN     # DISPO  - Full code-  confirmed on admission  - NOK: Tati Salgado (mother) (#[phone])  - DC home resumed O2 pending RBCex and improvement in pain  - FUV on 6/19 w/ sickle cell clinic, 7/15 and 8/13 with Cardiology     I spent >90 minutes in the professional and overall care of this patient.    Assessment and plan to be discussed with attending physician Dr. Cr.     Leanna Steven, GRISEL-CNP

## 2024-06-10 NOTE — PROGRESS NOTES
Patient in infusion center for sickle cell pain. Patient was admitted and report given to Damian PIZANO. Patient was taken to bed.

## 2024-06-11 ENCOUNTER — APPOINTMENT (OUTPATIENT)
Dept: RADIOLOGY | Facility: HOSPITAL | Age: 54
End: 2024-06-11
Payer: COMMERCIAL

## 2024-06-11 LAB
ALBUMIN SERPL BCP-MCNC: 3.7 G/DL (ref 3.4–5)
ALP SERPL-CCNC: 114 U/L (ref 33–110)
ALT SERPL W P-5'-P-CCNC: 5 U/L (ref 7–45)
ANION GAP SERPL CALC-SCNC: 11 MMOL/L (ref 10–20)
APTT PPP: 28 SECONDS (ref 27–38)
AST SERPL W P-5'-P-CCNC: 13 U/L (ref 9–39)
BASOPHILS # BLD MANUAL: 0 X10*3/UL (ref 0–0.1)
BASOPHILS NFR BLD MANUAL: 0 %
BILIRUB SERPL-MCNC: 2.5 MG/DL (ref 0–1.2)
BUN SERPL-MCNC: 33 MG/DL (ref 6–23)
BURR CELLS BLD QL SMEAR: NORMAL
CALCIUM SERPL-MCNC: 8.6 MG/DL (ref 8.6–10.6)
CHLORIDE SERPL-SCNC: 102 MMOL/L (ref 98–107)
CO2 SERPL-SCNC: 28 MMOL/L (ref 21–32)
CREAT SERPL-MCNC: 2.14 MG/DL (ref 0.5–1.05)
EGFRCR SERPLBLD CKD-EPI 2021: 27 ML/MIN/1.73M*2
EOSINOPHIL # BLD MANUAL: 0.14 X10*3/UL (ref 0–0.7)
EOSINOPHIL NFR BLD MANUAL: 1.7 %
ERYTHROCYTE [DISTWIDTH] IN BLOOD BY AUTOMATED COUNT: 27 % (ref 11.5–14.5)
GLUCOSE SERPL-MCNC: 86 MG/DL (ref 74–99)
HCT VFR BLD AUTO: 26.4 % (ref 36–46)
HEMOGLOBIN A2: 3.1 % (ref 2–3.5)
HEMOGLOBIN A: 9.6 % (ref 95.8–98)
HEMOGLOBIN C: 44.6 %
HEMOGLOBIN F: 0.5 % (ref 0–2)
HEMOGLOBIN IDENTIFICATION INTERPRETATION: ABNORMAL
HEMOGLOBIN S: 42.2 %
HGB BLD-MCNC: 9.2 G/DL (ref 12–16)
HGB RETIC QN: 30 PG (ref 28–38)
HOWELL-JOLLY BOD BLD QL SMEAR: PRESENT
IMM GRANULOCYTES # BLD AUTO: 0.03 X10*3/UL (ref 0–0.7)
IMM GRANULOCYTES NFR BLD AUTO: 0.4 % (ref 0–0.9)
IMMATURE RETIC FRACTION: 47.5 %
INR PPP: 1.1 (ref 0.9–1.1)
LDH SERPL L TO P-CCNC: 238 U/L (ref 84–246)
LYMPHOCYTES # BLD MANUAL: 1.33 X10*3/UL (ref 1.2–4.8)
LYMPHOCYTES NFR BLD MANUAL: 16 %
MCH RBC QN AUTO: 26.7 PG (ref 26–34)
MCHC RBC AUTO-ENTMCNC: 34.8 G/DL (ref 32–36)
MCV RBC AUTO: 77 FL (ref 80–100)
MONOCYTES # BLD MANUAL: 0.84 X10*3/UL (ref 0.1–1)
MONOCYTES NFR BLD MANUAL: 10.1 %
MYELOCYTES # BLD MANUAL: 0.07 X10*3/UL
MYELOCYTES NFR BLD MANUAL: 0.8 %
NEUTS SEG # BLD MANUAL: 5.93 X10*3/UL (ref 1.2–7)
NEUTS SEG NFR BLD MANUAL: 71.4 %
NRBC BLD-RTO: 45.1 /100 WBCS (ref 0–0)
PAPPENHEIMER BOD BLD QL SMEAR: PRESENT
PATH REVIEW-HGB IDENTIFICATION: ABNORMAL
PLATELET # BLD AUTO: 300 X10*3/UL (ref 150–450)
POLYCHROMASIA BLD QL SMEAR: NORMAL
POTASSIUM SERPL-SCNC: 4.9 MMOL/L (ref 3.5–5.3)
PROT SERPL-MCNC: 6.8 G/DL (ref 6.4–8.2)
PROTHROMBIN TIME: 12.9 SECONDS (ref 9.8–12.8)
RBC # BLD AUTO: 3.45 X10*6/UL (ref 4–5.2)
RBC MORPH BLD: NORMAL
RETICS #: 0.05 X10*6/UL (ref 0.02–0.08)
RETICS/RBC NFR AUTO: 1.4 % (ref 0.5–2)
SCHISTOCYTES BLD QL SMEAR: NORMAL
SICKLE CELLS BLD QL SMEAR: NORMAL
SODIUM SERPL-SCNC: 136 MMOL/L (ref 136–145)
TARGETS BLD QL SMEAR: NORMAL
TOTAL CELLS COUNTED BLD: 119
WBC # BLD AUTO: 8.3 X10*3/UL (ref 4.4–11.3)

## 2024-06-11 PROCEDURE — 1170000001 HC PRIVATE ONCOLOGY ROOM DAILY

## 2024-06-11 PROCEDURE — 83615 LACTATE (LD) (LDH) ENZYME: CPT

## 2024-06-11 PROCEDURE — 2500000001 HC RX 250 WO HCPCS SELF ADMINISTERED DRUGS (ALT 637 FOR MEDICARE OP)

## 2024-06-11 PROCEDURE — 94660 CPAP INITIATION&MGMT: CPT

## 2024-06-11 PROCEDURE — 2500000004 HC RX 250 GENERAL PHARMACY W/ HCPCS (ALT 636 FOR OP/ED)

## 2024-06-11 PROCEDURE — 36415 COLL VENOUS BLD VENIPUNCTURE: CPT

## 2024-06-11 PROCEDURE — 85027 COMPLETE CBC AUTOMATED: CPT

## 2024-06-11 PROCEDURE — 85610 PROTHROMBIN TIME: CPT

## 2024-06-11 PROCEDURE — 99233 SBSQ HOSP IP/OBS HIGH 50: CPT

## 2024-06-11 PROCEDURE — 76770 US EXAM ABDO BACK WALL COMP: CPT | Performed by: STUDENT IN AN ORGANIZED HEALTH CARE EDUCATION/TRAINING PROGRAM

## 2024-06-11 PROCEDURE — 85045 AUTOMATED RETICULOCYTE COUNT: CPT

## 2024-06-11 PROCEDURE — 76770 US EXAM ABDO BACK WALL COMP: CPT

## 2024-06-11 PROCEDURE — 2500000002 HC RX 250 W HCPCS SELF ADMINISTERED DRUGS (ALT 637 FOR MEDICARE OP, ALT 636 FOR OP/ED)

## 2024-06-11 PROCEDURE — 80053 COMPREHEN METABOLIC PANEL: CPT

## 2024-06-11 PROCEDURE — 85007 BL SMEAR W/DIFF WBC COUNT: CPT

## 2024-06-11 RX ADMIN — HYDROMORPHONE HYDROCHLORIDE 1 MG: 1 INJECTION, SOLUTION INTRAMUSCULAR; INTRAVENOUS; SUBCUTANEOUS at 15:35

## 2024-06-11 RX ADMIN — DOCUSATE SODIUM AND SENNOSIDES 2 TABLET: 8.6; 5 TABLET, FILM COATED ORAL at 15:35

## 2024-06-11 RX ADMIN — Medication 1 TABLET: at 09:13

## 2024-06-11 RX ADMIN — HYDROMORPHONE HYDROCHLORIDE 1 MG: 1 INJECTION, SOLUTION INTRAMUSCULAR; INTRAVENOUS; SUBCUTANEOUS at 12:17

## 2024-06-11 RX ADMIN — HYDROMORPHONE HYDROCHLORIDE 1 MG: 1 INJECTION, SOLUTION INTRAMUSCULAR; INTRAVENOUS; SUBCUTANEOUS at 18:53

## 2024-06-11 RX ADMIN — WARFARIN SODIUM 5 MG: 5 TABLET ORAL at 18:16

## 2024-06-11 RX ADMIN — DIPHENHYDRAMINE HYDROCHLORIDE 25 MG: 25 CAPSULE ORAL at 15:38

## 2024-06-11 RX ADMIN — SODIUM CHLORIDE, SODIUM LACTATE, POTASSIUM CHLORIDE, AND CALCIUM CHLORIDE 500 ML: 600; 310; 30; 20 INJECTION, SOLUTION INTRAVENOUS at 12:15

## 2024-06-11 RX ADMIN — FOLIC ACID 1 MG: 1 TABLET ORAL at 09:13

## 2024-06-11 RX ADMIN — FUROSEMIDE 20 MG: 20 TABLET ORAL at 09:13

## 2024-06-11 RX ADMIN — HYDROMORPHONE HYDROCHLORIDE 1 MG: 1 INJECTION, SOLUTION INTRAMUSCULAR; INTRAVENOUS; SUBCUTANEOUS at 04:41

## 2024-06-11 RX ADMIN — HYDROMORPHONE HYDROCHLORIDE 1 MG: 1 INJECTION, SOLUTION INTRAMUSCULAR; INTRAVENOUS; SUBCUTANEOUS at 09:13

## 2024-06-11 RX ADMIN — Medication 500 MG: at 09:00

## 2024-06-11 RX ADMIN — DIPHENHYDRAMINE HYDROCHLORIDE 25 MG: 25 CAPSULE ORAL at 21:40

## 2024-06-11 ASSESSMENT — COGNITIVE AND FUNCTIONAL STATUS - GENERAL
PATIENT BASELINE BEDBOUND: NO
DAILY ACTIVITIY SCORE: 24
MOBILITY SCORE: 24
MOBILITY SCORE: 24
DAILY ACTIVITIY SCORE: 24

## 2024-06-11 ASSESSMENT — PAIN DESCRIPTION - LOCATION: LOCATION: GENERALIZED

## 2024-06-11 ASSESSMENT — PAIN - FUNCTIONAL ASSESSMENT
PAIN_FUNCTIONAL_ASSESSMENT: 0-10

## 2024-06-11 ASSESSMENT — PAIN SCALES - GENERAL
PAINLEVEL_OUTOF10: 2
PAINLEVEL_OUTOF10: 3
PAINLEVEL_OUTOF10: 7
PAINLEVEL_OUTOF10: 8

## 2024-06-11 NOTE — CARE PLAN
Problem: Pain - Adult  Goal: Verbalizes/displays adequate comfort level or baseline comfort level  Outcome: Progressing     Problem: Safety - Adult  Goal: Free from fall injury  Outcome: Progressing     Problem: Discharge Planning  Goal: Discharge to home or other facility with appropriate resources  Outcome: Progressing     Problem: Chronic Conditions and Co-morbidities  Goal: Patient's chronic conditions and co-morbidity symptoms are monitored and maintained or improved  Outcome: Progressing     Problem: Fall/Injury  Goal: Not fall by end of shift  Outcome: Progressing  Goal: Be free from injury by end of the shift  Outcome: Progressing  Goal: Verbalize understanding of personal risk factors for fall in the hospital  Outcome: Progressing  Goal: Verbalize understanding of risk factor reduction measures to prevent injury from fall in the home  Outcome: Progressing  Goal: Use assistive devices by end of the shift  Outcome: Progressing  Goal: Pace activities to prevent fatigue by end of the shift  Outcome: Progressing   The patient's goals for the shift include      The clinical goals for the shift include Pain management throughout shift    Over the shift, the patient made progress toward goals. Patient tolerating PRN dilaudid with decrease in pain levels after administration. Patient still requiring PRN dilaudid to manage pain.

## 2024-06-11 NOTE — SIGNIFICANT EVENT
"Rapid Response RN Note    Rapid response RN for RADAR score 7 due to the recent VS listed below:     Vitals:    06/10/24 1606 06/10/24 1611 06/10/24 1751 06/10/24 2057   BP: 87/55 93/60 101/70 106/70   BP Location: Left arm Left arm Left arm Left arm   Patient Position:  Lying Lying Lying   Pulse: 85 86 87 95   Resp:  18 18 18   Temp: 35.8 °C (96.4 °F)  36.1 °C (97 °F) 36.6 °C (97.9 °F)   TempSrc:   Temporal Temporal   SpO2: 97% 99% 98% 96%   Weight:  109 kg (240 lb)     Height:  1.626 m (5' 4\")          Reviewed above VS with bedside RN via phone.  Per RN, pox was entered incorrectly.  RN corrected error.  VS within patient's current trends.  No concerns from RN.  No interventions by rapid response team indicated at this time.  Staff to page rapid response for any concerns or acute change in condition/VS.    "

## 2024-06-11 NOTE — PROGRESS NOTES
"Senait Narvaez is a 54 y.o. female on day 1 of admission presenting with Sickle cell anemia with pain (Multi).    Subjective   Seen pt at bedside. Pt endorsing severe pain, located in her BL arms, BL shoulders and joints. She states that the pain medication is working and providing relief. She also endorsed having issues with voiding before being admitted, and further discussed her ultrasound results. Denies CP, bleeding, fever/chills, N/V/C/D, SOB, H/A.      Objective     Physical Exam  Vitals reviewed.   Constitutional:       Appearance: Normal appearance. She is obese.   HENT:      Head: Normocephalic and atraumatic.      Nose: Nose normal.      Mouth/Throat:      Mouth: Mucous membranes are moist.      Pharynx: Oropharynx is clear.   Eyes:      Extraocular Movements: Extraocular movements intact.      Pupils: Pupils are equal, round, and reactive to light.   Cardiovascular:      Rate and Rhythm: Normal rate and regular rhythm.      Pulses: Normal pulses.      Heart sounds: Normal heart sounds.   Pulmonary:      Effort: Pulmonary effort is normal.      Breath sounds: Normal breath sounds.   Abdominal:      General: Bowel sounds are normal.      Palpations: Abdomen is soft.   Musculoskeletal:         General: Normal range of motion.   Skin:     General: Skin is warm.   Neurological:      General: No focal deficit present.      Mental Status: She is alert and oriented to person, place, and time. Mental status is at baseline.   Psychiatric:         Mood and Affect: Mood normal.         Behavior: Behavior normal.       Last Recorded Vitals  Blood pressure 116/74, pulse 73, temperature 36.6 °C (97.9 °F), temperature source Temporal, resp. rate 16, height 1.626 m (5' 4\"), weight 109 kg (240 lb), SpO2 95%.  Intake/Output last 3 Shifts:  I/O last 3 completed shifts:  In: 1000 (9.2 mL/kg) [IV Piggyback:1000]  Out: - (0 mL/kg)   Weight: 108.9 kg     Relevant Results  Scheduled medications  ascorbic acid, 500 mg, oral, " Daily  ergocalciferol, 50,000 Units, oral, Every Sunday  folic acid, 1 mg, oral, Daily  furosemide, 20 mg, oral, Every other day  lactated Ringer's, 500 mL, intravenous, Once  lidocaine, 1 patch, transdermal, Daily  [Held by provider] metoprolol tartrate, 12.5 mg, oral, BID  multivitamin with minerals, 1 tablet, oral, Daily  sennosides-docusate sodium, 2 tablet, oral, q12h  warfarin, 5 mg, oral, Daily      Continuous medications  oxygen, , Last Rate: 3 L/min (06/10/24 1630)      PRN medications  PRN medications: albuterol, diphenhydrAMINE, docusate sodium **OR** polyethylene glycol, fluticasone, HYDROmorphone, loratadine, naloxone, ondansetron ODT **OR** ondansetron     US renal complete    Result Date: 6/11/2024  Interpreted By:  Kelin Preciado, STUDY: US RENAL COMPLETE;  6/11/2024 10:50 am   INDICATION: Signs/Symptoms:ruddy, bilateral flank pain, has not voided in 2 days.   COMPARISON: None.   ACCESSION NUMBER(S): [id number]   ORDERING CLINICIAN: TATI DE GUZMAN   TECHNIQUE: Multiple images of the kidneys were obtained  .   FINDINGS: RIGHT KIDNEY: The right kidney measures 9.5 cm in length. The renal cortical echogenicity and thickness are within normal limits. No hydronephrosis is present; no evidence of nephrolithiasis.   LEFT KIDNEY: The left kidney measures 9.7 cm in length. The renal cortical echogenicity and thickness are within normal limits. No hydronephrosis is present; no evidence of nephrolithiasis.   BLADDER: The urinary bladder is unremarkable in appearance.       Unremarkable renal ultrasound. No hydronephrosis or nephrolithiasis.   I personally reviewed the images/study and I agree with Kelin Preciado DO's (radiology resident) findings as stated. This study was interpreted at [facility], [place], [place].   MACRO: None     Dictation workstation:   [id number]    XR chest 1 view    Result Date: 6/10/2024  Interpreted By:  Mylene Garcia,  and Lyudmila Pittman  STUDY: XR CHEST 1 VIEW;  6/10/2024 11:46 am   INDICATION: Signs/Symptoms:cough.   COMPARISON: Chest radiograph 02/27/2024   ACCESSION NUMBER(S): [id number]   ORDERING CLINICIAN: NAVA CORTEZ   FINDINGS: AP radiograph of the chest was provided.   CARDIOMEDIASTINAL SILHOUETTE: Cardiomediastinal silhouette is normal in size and configuration.   LUNGS: Hazy opacity in the left lower lung and slight blunting of the left costophrenic angle, similar to prior. The right lung is clear. No pneumothorax.   ABDOMEN: No remarkable upper abdominal findings.   BONES: No acute osseous changes.       1.  No evidence of acute cardiopulmonary process. 2. Persistence of blunting of the left costophrenic angle with adjacent left lower lung hazy opacity, most likely representing persistent atelectasis/scarring.   I personally reviewed the images/study and resident's interpretation and I agree with the findings as stated by Toya Coreas MD (resident radiologist). This study was analyzed and interpreted at [place], [place].   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 Patient started on IV Dilaudid for pain management. Upon arrival to [facility] on admission, pt given naloxone 0.2mcg x1 dose for hypotension, hypoxia and drowsiness (6/10). Pt c/o bilateral flank pain on admit with associated oliguria, BL renal US ordered (6/10); results unremarkable, no  hydronephrosis or nephrolithiasis. DC home pending pain and CHARLES improvement.     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  - Previously managed through routine RBC exchanges q6 weeks, most recent RBCEx 3/1/24, per patient pausing on transfusions for time being  - OARRS reviewed, no aberrant behavior noted (Last refilled oxy 10mg tab qty 75, x12 days on 6/4/24)   - No leukocytosis on admit, afebrile, no s/s infectious on admit   - Hemolysis labs at baseline, Hbg baseline ~8-11; Hbg 9.6 on admit (6/10)  - CXR w/o evidence of acute process, atelectasis vs scarring (6/10)  - Care plan from 12/6/23- For mild to moderate pain: Dilaudid 0.5mg IVP q3h as needed, for moderate to severe pain Dilaudid 1- 1.5mg q3h PRN  - On arrival to [facility], naloxone 0.2mg administered for hypotension, hypoxia and drowsiness (6/10)  - For pain management - started on IV dilaudid 1 mg q3h PRN on admit (6/10- )  - bowel regimen for opioid induced constipation, zofran for opioid induced nausea, and benadrly for opioid induced pruritus[place] [facility] [place]  - c/w home Duloxetine 40mg daily, PO Zofran PRN, Folic Acid 1mg daily, and MVI daily     # pHTN   - 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 Yi and Dr. 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 (PFT to reevaluate for obstruction and small airway disease), and sleep apnea    - c/w home 2 L via CPAP at night   - Follows with pulmonology outpt     # Hx SVT  - Baseline admission EKG pending on admit   - ECHO (01/20/24) EF 60-65%  - c/w home 20mg lasix every other day   - Metoprolol Succs 12.5mg daily on hold until BP stabilizes (6/11) consider  restarting 6/12  - Follows with Cardiology outpt, 2/13 most recent OP visit, tolerating all meds including lasix 20 mg every other day, metop tartrate 12.5mg BID to control SVT     # DVT/ PE/ SVC Thrombus  - Hx SVC stricture s/p SVC angioplasty  - B/l Upper Extremity and Facial Swelling d/t partial filling defect within the SVC and a thrombus of the SVC complicated by bilateral Mediport catheters. On lifelong anticoagulation, DOAC discouraged due to high risk of clotting   - Cont w/ home Coumadin 5mg daily  - Monitors INR with at home Coagcheck device  - Follows with Coumadin clinic outpatient  - Caution with fluids      # CKD II  # CHARLES  - Baseline ~ SCr <2, 1.86 on admission (6/10)  - Thought to be related to hypotension mediated by metoprolol (hx of SVT) and furosemide (hx of heart failure)  - Follows with nephrology outpt - most recent appointment 1/11/24, check labs q6 mo, FUV in 1yr Nephrology   - pt c/o BL flank pain on admission (6/10) s/p 1L D5 1/2NS bolus  - BL renal US (6/11) results unremarkable, no hydronephrosis or nephrolithiasis  - Cr 2.14 (6.11) s/p 500ml LR bolus over 6 hours  - UA (6/11) pending     # CAN  - cont home CPAP   - cont home Albuterol PRN     # H/O Cauda Equine syndrome  - Follows Dr. Delacruz as outpatient     #Misc  - cont home Vit C 500mg, Vit D 50,000 weekly (Sunday), multivitamin daily  - cont home loratadine 10mg daily PRN     # DISPO  - Full code- confirmed on admission  - NOK: Tati Salgado (mother) (#[phone])  - DC home resumed O2 pending RBCex and improvement in pain  - FUV on 6/19 w/ sickle cell clinic, 7/15 and 8/13 with Cardiology    I spent >60 minutes in the professional and overall care of this patient.    Assessment and plan discussed with attending physician Dr. Cr.    Leanna Steven, APRN-CNP

## 2024-06-12 ENCOUNTER — TELEPHONE (OUTPATIENT)
Dept: HEMATOLOGY/ONCOLOGY | Facility: HOSPITAL | Age: 54
End: 2024-06-12

## 2024-06-12 LAB
ALBUMIN SERPL BCP-MCNC: 3.4 G/DL (ref 3.4–5)
ALP SERPL-CCNC: 107 U/L (ref 33–110)
ALT SERPL W P-5'-P-CCNC: 5 U/L (ref 7–45)
ANION GAP SERPL CALC-SCNC: 8 MMOL/L (ref 10–20)
AST SERPL W P-5'-P-CCNC: 11 U/L (ref 9–39)
BASOPHILS # BLD MANUAL: 0 X10*3/UL (ref 0–0.1)
BASOPHILS NFR BLD MANUAL: 0 %
BILIRUB SERPL-MCNC: 1.6 MG/DL (ref 0–1.2)
BUN SERPL-MCNC: 23 MG/DL (ref 6–23)
CALCIUM SERPL-MCNC: 8.5 MG/DL (ref 8.6–10.6)
CHLORIDE SERPL-SCNC: 104 MMOL/L (ref 98–107)
CO2 SERPL-SCNC: 31 MMOL/L (ref 21–32)
CREAT SERPL-MCNC: 1.37 MG/DL (ref 0.5–1.05)
EGFRCR SERPLBLD CKD-EPI 2021: 46 ML/MIN/1.73M*2
EOSINOPHIL # BLD MANUAL: 0.14 X10*3/UL (ref 0–0.7)
EOSINOPHIL NFR BLD MANUAL: 1.8 %
ERYTHROCYTE [DISTWIDTH] IN BLOOD BY AUTOMATED COUNT: 26.6 % (ref 11.5–14.5)
GLUCOSE SERPL-MCNC: 71 MG/DL (ref 74–99)
HCT VFR BLD AUTO: 24.7 % (ref 36–46)
HGB BLD-MCNC: 8.7 G/DL (ref 12–16)
HGB RETIC QN: 27 PG (ref 28–38)
IMM GRANULOCYTES # BLD AUTO: 0.02 X10*3/UL (ref 0–0.7)
IMM GRANULOCYTES NFR BLD AUTO: 0.3 % (ref 0–0.9)
IMMATURE RETIC FRACTION: 47.2 %
LDH SERPL L TO P-CCNC: 215 U/L (ref 84–246)
LYMPHOCYTES # BLD MANUAL: 0.87 X10*3/UL (ref 1.2–4.8)
LYMPHOCYTES NFR BLD MANUAL: 11.4 %
MCH RBC QN AUTO: 26.9 PG (ref 26–34)
MCHC RBC AUTO-ENTMCNC: 35.2 G/DL (ref 32–36)
MCV RBC AUTO: 76 FL (ref 80–100)
MONOCYTES # BLD MANUAL: 0.46 X10*3/UL (ref 0.1–1)
MONOCYTES NFR BLD MANUAL: 6.1 %
MYELOCYTES # BLD MANUAL: 0.07 X10*3/UL
MYELOCYTES NFR BLD MANUAL: 0.9 %
NEUTS SEG # BLD MANUAL: 5.73 X10*3/UL (ref 1.2–7)
NEUTS SEG NFR BLD MANUAL: 75.4 %
NRBC BLD-RTO: 55.6 /100 WBCS (ref 0–0)
PLATELET # BLD AUTO: 269 X10*3/UL (ref 150–450)
POLYCHROMASIA BLD QL SMEAR: ABNORMAL
POTASSIUM SERPL-SCNC: 4.7 MMOL/L (ref 3.5–5.3)
PROT SERPL-MCNC: 6.4 G/DL (ref 6.4–8.2)
RBC # BLD AUTO: 3.24 X10*6/UL (ref 4–5.2)
RBC MORPH BLD: ABNORMAL
RETICS #: 0.06 X10*6/UL (ref 0.02–0.08)
RETICS/RBC NFR AUTO: 1.7 % (ref 0.5–2)
SODIUM SERPL-SCNC: 138 MMOL/L (ref 136–145)
TARGETS BLD QL SMEAR: ABNORMAL
TOTAL CELLS COUNTED BLD: 114
VARIANT LYMPHS # BLD MANUAL: 0.33 X10*3/UL (ref 0–0.5)
VARIANT LYMPHS NFR BLD: 4.4 %
WBC # BLD AUTO: 7.6 X10*3/UL (ref 4.4–11.3)

## 2024-06-12 PROCEDURE — 85007 BL SMEAR W/DIFF WBC COUNT: CPT

## 2024-06-12 PROCEDURE — 2500000002 HC RX 250 W HCPCS SELF ADMINISTERED DRUGS (ALT 637 FOR MEDICARE OP, ALT 636 FOR OP/ED)

## 2024-06-12 PROCEDURE — 36415 COLL VENOUS BLD VENIPUNCTURE: CPT

## 2024-06-12 PROCEDURE — 83615 LACTATE (LD) (LDH) ENZYME: CPT

## 2024-06-12 PROCEDURE — 99233 SBSQ HOSP IP/OBS HIGH 50: CPT

## 2024-06-12 PROCEDURE — 1170000001 HC PRIVATE ONCOLOGY ROOM DAILY

## 2024-06-12 PROCEDURE — 94660 CPAP INITIATION&MGMT: CPT

## 2024-06-12 PROCEDURE — 2500000001 HC RX 250 WO HCPCS SELF ADMINISTERED DRUGS (ALT 637 FOR MEDICARE OP)

## 2024-06-12 PROCEDURE — 2500000004 HC RX 250 GENERAL PHARMACY W/ HCPCS (ALT 636 FOR OP/ED)

## 2024-06-12 PROCEDURE — 85045 AUTOMATED RETICULOCYTE COUNT: CPT

## 2024-06-12 PROCEDURE — 85027 COMPLETE CBC AUTOMATED: CPT

## 2024-06-12 PROCEDURE — 80053 COMPREHEN METABOLIC PANEL: CPT

## 2024-06-12 RX ADMIN — HYDROMORPHONE HYDROCHLORIDE 1 MG: 1 INJECTION, SOLUTION INTRAMUSCULAR; INTRAVENOUS; SUBCUTANEOUS at 12:14

## 2024-06-12 RX ADMIN — DOCUSATE SODIUM AND SENNOSIDES 2 TABLET: 8.6; 5 TABLET, FILM COATED ORAL at 16:02

## 2024-06-12 RX ADMIN — HYDROMORPHONE HYDROCHLORIDE 1 MG: 1 INJECTION, SOLUTION INTRAMUSCULAR; INTRAVENOUS; SUBCUTANEOUS at 22:11

## 2024-06-12 RX ADMIN — HYDROMORPHONE HYDROCHLORIDE 1 MG: 1 INJECTION, SOLUTION INTRAMUSCULAR; INTRAVENOUS; SUBCUTANEOUS at 16:02

## 2024-06-12 RX ADMIN — DIPHENHYDRAMINE HYDROCHLORIDE 25 MG: 25 CAPSULE ORAL at 08:25

## 2024-06-12 RX ADMIN — HYDROMORPHONE HYDROCHLORIDE 1 MG: 1 INJECTION, SOLUTION INTRAMUSCULAR; INTRAVENOUS; SUBCUTANEOUS at 19:33

## 2024-06-12 RX ADMIN — HYDROMORPHONE HYDROCHLORIDE 1 MG: 1 INJECTION, SOLUTION INTRAMUSCULAR; INTRAVENOUS; SUBCUTANEOUS at 08:17

## 2024-06-12 RX ADMIN — FOLIC ACID 1 MG: 1 TABLET ORAL at 08:25

## 2024-06-12 RX ADMIN — HYDROMORPHONE HYDROCHLORIDE 1 MG: 1 INJECTION, SOLUTION INTRAMUSCULAR; INTRAVENOUS; SUBCUTANEOUS at 04:18

## 2024-06-12 RX ADMIN — DOCUSATE SODIUM AND SENNOSIDES 2 TABLET: 8.6; 5 TABLET, FILM COATED ORAL at 04:18

## 2024-06-12 RX ADMIN — WARFARIN SODIUM 5 MG: 5 TABLET ORAL at 17:13

## 2024-06-12 RX ADMIN — Medication 1 TABLET: at 08:25

## 2024-06-12 RX ADMIN — Medication 500 MG: at 09:00

## 2024-06-12 ASSESSMENT — COGNITIVE AND FUNCTIONAL STATUS - GENERAL
DAILY ACTIVITIY SCORE: 24
MOBILITY SCORE: 24

## 2024-06-12 ASSESSMENT — PAIN SCALES - GENERAL
PAINLEVEL_OUTOF10: 9
PAINLEVEL_OUTOF10: 3
PAINLEVEL_OUTOF10: 8
PAINLEVEL_OUTOF10: 7
PAINLEVEL_OUTOF10: 8

## 2024-06-12 ASSESSMENT — PAIN - FUNCTIONAL ASSESSMENT
PAIN_FUNCTIONAL_ASSESSMENT: 0-10
PAIN_FUNCTIONAL_ASSESSMENT: 0-10

## 2024-06-12 NOTE — PROGRESS NOTES
"Senait Narvaez is a 54 y.o. female on day 2 of admission presenting with Sickle cell anemia with pain (Multi).    Subjective   Patient seen at bedside. States that she continues to have generalized pain. States that her pain goes all the way into her toes which is new. Denies any SOB, CP, heart palpitations, fever/chills, N/V/D/C, headache dizziness of vision changes        Objective     Physical Exam  HENT:      Head: Normocephalic.      Nose: Nose normal.      Mouth/Throat:      Mouth: Mucous membranes are moist.   Eyes:      Pupils: Pupils are equal, round, and reactive to light.   Cardiovascular:      Rate and Rhythm: Normal rate.      Pulses: Normal pulses.   Pulmonary:      Effort: Pulmonary effort is normal.   Abdominal:      Palpations: Abdomen is soft.   Musculoskeletal:         General: Normal range of motion.      Cervical back: Normal range of motion.   Skin:     General: Skin is warm.      Capillary Refill: Capillary refill takes less than 2 seconds.   Neurological:      General: No focal deficit present.      Mental Status: She is alert.   Psychiatric:         Mood and Affect: Mood normal.         Last Recorded Vitals  Blood pressure 92/61, pulse 67, temperature 36.2 °C (97.2 °F), temperature source Temporal, resp. rate 15, height 1.626 m (5' 4\"), weight 109 kg (240 lb), SpO2 99%.  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 Patient started on IV Dilaudid for pain management. Upon arrival to [facility] on admission, pt given naloxone 0.2mcg x1 dose for hypotension, hypoxia and drowsiness (6/10). Pt c/o bilateral flank pain on admit with associated oliguria, BL renal US ordered (6/10); results unremarkable, no hydronephrosis or nephrolithiasis. 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  - Previously managed through routine RBC exchanges q6 weeks, most recent RBCEx 3/1/24, per patient pausing on transfusions for time being  - OARRS reviewed, no aberrant behavior noted (Last refilled oxy 10mg tab qty 75, x12 days on 6/4/24)   - No leukocytosis on admit, afebrile, no s/s infectious on admit   - Hemolysis labs at baseline, Hbg baseline ~8-11; Hbg 9.6 on admit (6/10)  - CXR w/o evidence of acute process, atelectasis vs scarring (6/10)  - Care plan from 12/6/23- For mild to moderate pain: Dilaudid 0.5mg IVP q3h as needed, for moderate to severe pain Dilaudid 1- 1.5mg q3h PRN  - On arrival to [facility], naloxone 0.2mg administered for hypotension, hypoxia and drowsiness (6/10)  - For pain management - started on IV dilaudid 1 mg q3h PRN on admit (6/10- )  - bowel regimen for opioid induced constipation, zofran for opioid induced nausea, and benadrly for opioid induced pruritus; LBM 2/28  - c/w home Duloxetine 40mg daily, PO Zofran PRN, Folic Acid 1mg daily, and MVI daily     # pHTN   - 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 Yi and Dr. 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 (PFT to  reevaluate for obstruction and small airway disease), and sleep apnea    - c/w home 2 L via CPAP at night   - Follows with pulmonology outpt     # Hx SVT  - Baseline admission EKG pending on admit   - ECHO (01/20/24) EF 60-65%  - c/w home 20mg lasix every other day   - Metoprolol Succs 12.5mg daily on hold until BP stabilizes (6/11) consider restarting 6/12  - Follows with Cardiology outpt, 2/13 most recent OP visit, tolerating all meds including lasix 20 mg every other day, metop tartrate 12.5mg BID to control SVT     # DVT/ PE/ SVC Thrombus  - Hx SVC stricture s/p SVC angioplasty  - B/l Upper Extremity and Facial Swelling d/t partial filling defect within the SVC and a thrombus of the SVC complicated by bilateral Mediport catheters. On lifelong anticoagulation, DOAC discouraged due to high risk of clotting   - Cont w/ home Coumadin 5mg daily  - Monitors INR with at home Coagcheck device  - Follows with Coumadin clinic outpatient  - Caution with fluids      # CKD II  # CHARLES-improved   - Baseline ~ SCr <2, 1.86 on admission (6/10)  - Thought to be related to hypotension mediated by metoprolol (hx of SVT) and furosemide (hx of heart failure)  - Follows with nephrology outpt - most recent appointment 1/11/24, check labs q6 mo, FUV in 1yr Nephrology   - pt c/o BL flank pain on admission (6/10) s/p 1L D5 1/2NS bolus  - BL renal US (6/11) results unremarkable, no hydronephrosis or nephrolithiasis  - Cr 2.14 (6.11) s/p 500ml LR bolus over 6 hours  - UA (6/11) pending     # CNA  - cont home CPAP   - cont home Albuterol PRN     # H/O Cauda Equine syndrome  - Follows Dr. Delacruz as outpatient     #Misc  - cont home Vit C 500mg, Vit D 50,000 weekly (Sunday), multivitamin daily  - cont home loratadine 10mg daily PRN     # DISPO  - Full code- confirmed on admission  - NOK: Tati Salgado (mother) (#[phone])  - DC home resumed O2 pending RBCex and improvement in pain  - FUV on 6/19 w/ sickle cell clinic, 7/15 and 8/13 with  Cardiology         I spent 60 minutes in the professional and overall care of this patient.      GRISEL Gomes-CNP  Patient discussed with Dr. Cr

## 2024-06-12 NOTE — PROGRESS NOTES
Music Therapy Note    Senait Narvaez     Therapy Session  Referral Type: Pain rounds  Visit Type: Follow-up visit  Session Start Time: 1547  Session End Time: 1552  Intervention Delivery: In-person  Conflict of Service: None  Family Present for Session: None     Pre-assessment  Unable to Assess Reason: Outcomes not applicable  Mood/Affect: Appropriate, Calm, Cooperative         Treatment/Interventions  Music Therapy Interventions: Assessment  Interruption: No  Patient Fell Asleep at End of Session: No    Post-assessment  Unable to Assess Reason: Did not provide expressive therapy intervention  Mood/Affect: Appropriate, Calm, Cooperative  Continue Visiting: No  Total Session Time (min): 5 minutes    Narrative  Assessment Detail: Patient presented awake and alert, in bed with HOB raised, displaying calm affect. Patient familiar to this MT from a prior admission, but that admit was over a year ago and pt understandably did not recognize this MT. MT introduced self and role and pt was receptive to music therapy education. Patient denied needs at this time but expressed gratitude for MT's visit.  Follow-up: MT to sign off.    Education Documentation  No documentation found.

## 2024-06-13 ENCOUNTER — ANTICOAGULATION - WARFARIN VISIT (OUTPATIENT)
Dept: CARDIOLOGY | Facility: CLINIC | Age: 54
End: 2024-06-13
Payer: COMMERCIAL

## 2024-06-13 DIAGNOSIS — I82.210 THROMBOSIS OF SUPERIOR VENA CAVA (MULTI): ICD-10-CM

## 2024-06-13 DIAGNOSIS — I26.99 RECURRENT PULMONARY EMBOLISM (MULTI): ICD-10-CM

## 2024-06-13 DIAGNOSIS — I82.4Y9 DEEP VEIN THROMBOSIS (DVT) OF PROXIMAL LOWER EXTREMITY, UNSPECIFIED CHRONICITY, UNSPECIFIED LATERALITY (MULTI): Primary | ICD-10-CM

## 2024-06-13 LAB
ALBUMIN SERPL BCP-MCNC: 3.6 G/DL (ref 3.4–5)
ALP SERPL-CCNC: 104 U/L (ref 33–110)
ALT SERPL W P-5'-P-CCNC: 5 U/L (ref 7–45)
ANION GAP SERPL CALC-SCNC: 9 MMOL/L (ref 10–20)
AST SERPL W P-5'-P-CCNC: 20 U/L (ref 9–39)
BASOPHILS # BLD MANUAL: 0 X10*3/UL (ref 0–0.1)
BASOPHILS NFR BLD MANUAL: 0 %
BILIRUB SERPL-MCNC: 1.3 MG/DL (ref 0–1.2)
BUN SERPL-MCNC: 16 MG/DL (ref 6–23)
CALCIUM SERPL-MCNC: 8.7 MG/DL (ref 8.6–10.6)
CHLORIDE SERPL-SCNC: 103 MMOL/L (ref 98–107)
CO2 SERPL-SCNC: 32 MMOL/L (ref 21–32)
CREAT SERPL-MCNC: 1.05 MG/DL (ref 0.5–1.05)
EGFRCR SERPLBLD CKD-EPI 2021: 63 ML/MIN/1.73M*2
EOSINOPHIL # BLD MANUAL: 0.36 X10*3/UL (ref 0–0.7)
EOSINOPHIL NFR BLD MANUAL: 5 %
ERYTHROCYTE [DISTWIDTH] IN BLOOD BY AUTOMATED COUNT: 26.7 % (ref 11.5–14.5)
GLUCOSE SERPL-MCNC: 70 MG/DL (ref 74–99)
HCT VFR BLD AUTO: 27.3 % (ref 36–46)
HGB BLD-MCNC: 9.2 G/DL (ref 12–16)
HGB RETIC QN: 23 PG (ref 28–38)
HOWELL-JOLLY BOD BLD QL SMEAR: PRESENT
HYPOCHROMIA BLD QL SMEAR: ABNORMAL
IMM GRANULOCYTES # BLD AUTO: 0.03 X10*3/UL (ref 0–0.7)
IMM GRANULOCYTES NFR BLD AUTO: 0.4 % (ref 0–0.9)
IMMATURE RETIC FRACTION: 46.6 %
LDH SERPL L TO P-CCNC: 279 U/L (ref 84–246)
LYMPHOCYTES # BLD MANUAL: 1.94 X10*3/UL (ref 1.2–4.8)
LYMPHOCYTES NFR BLD MANUAL: 27 %
MCH RBC QN AUTO: 26.7 PG (ref 26–34)
MCHC RBC AUTO-ENTMCNC: 33.7 G/DL (ref 32–36)
MCV RBC AUTO: 79 FL (ref 80–100)
MONOCYTES # BLD MANUAL: 0.43 X10*3/UL (ref 0.1–1)
MONOCYTES NFR BLD MANUAL: 6 %
NEUTS SEG # BLD MANUAL: 4.39 X10*3/UL (ref 1.2–7)
NEUTS SEG NFR BLD MANUAL: 61 %
NRBC BLD MANUAL-RTO: 152 % (ref 0–0)
NRBC BLD-RTO: 2.1 /100 WBCS (ref 0–0)
PLATELET # BLD AUTO: 267 X10*3/UL (ref 150–450)
POLYCHROMASIA BLD QL SMEAR: ABNORMAL
POTASSIUM SERPL-SCNC: 5.7 MMOL/L (ref 3.5–5.3)
PROT SERPL-MCNC: 6.2 G/DL (ref 6.4–8.2)
RBC # BLD AUTO: 3.45 X10*6/UL (ref 4–5.2)
RBC MORPH BLD: ABNORMAL
RETICS #: 0.07 X10*6/UL (ref 0.02–0.08)
RETICS/RBC NFR AUTO: 1.9 % (ref 0.5–2)
SODIUM SERPL-SCNC: 138 MMOL/L (ref 136–145)
TARGETS BLD QL SMEAR: ABNORMAL
TOTAL CELLS COUNTED BLD: 100
VARIANT LYMPHS # BLD MANUAL: 0.07 X10*3/UL (ref 0–0.5)
VARIANT LYMPHS NFR BLD: 1 %
WBC # BLD AUTO: 7.2 X10*3/UL (ref 4.4–11.3)

## 2024-06-13 PROCEDURE — 2500000004 HC RX 250 GENERAL PHARMACY W/ HCPCS (ALT 636 FOR OP/ED)

## 2024-06-13 PROCEDURE — 1170000001 HC PRIVATE ONCOLOGY ROOM DAILY

## 2024-06-13 PROCEDURE — 83615 LACTATE (LD) (LDH) ENZYME: CPT

## 2024-06-13 PROCEDURE — 2500000001 HC RX 250 WO HCPCS SELF ADMINISTERED DRUGS (ALT 637 FOR MEDICARE OP)

## 2024-06-13 PROCEDURE — 85027 COMPLETE CBC AUTOMATED: CPT

## 2024-06-13 PROCEDURE — 85045 AUTOMATED RETICULOCYTE COUNT: CPT

## 2024-06-13 PROCEDURE — 85007 BL SMEAR W/DIFF WBC COUNT: CPT

## 2024-06-13 PROCEDURE — 99233 SBSQ HOSP IP/OBS HIGH 50: CPT

## 2024-06-13 PROCEDURE — 80053 COMPREHEN METABOLIC PANEL: CPT

## 2024-06-13 PROCEDURE — 36415 COLL VENOUS BLD VENIPUNCTURE: CPT

## 2024-06-13 PROCEDURE — 2500000002 HC RX 250 W HCPCS SELF ADMINISTERED DRUGS (ALT 637 FOR MEDICARE OP, ALT 636 FOR OP/ED)

## 2024-06-13 RX ADMIN — FUROSEMIDE 20 MG: 20 TABLET ORAL at 08:52

## 2024-06-13 RX ADMIN — HYDROMORPHONE HYDROCHLORIDE 1 MG: 1 INJECTION, SOLUTION INTRAMUSCULAR; INTRAVENOUS; SUBCUTANEOUS at 08:52

## 2024-06-13 RX ADMIN — WARFARIN SODIUM 5 MG: 5 TABLET ORAL at 17:56

## 2024-06-13 RX ADMIN — HYDROMORPHONE HYDROCHLORIDE 1 MG: 1 INJECTION, SOLUTION INTRAMUSCULAR; INTRAVENOUS; SUBCUTANEOUS at 22:46

## 2024-06-13 RX ADMIN — HYDROMORPHONE HYDROCHLORIDE 1 MG: 1 INJECTION, SOLUTION INTRAMUSCULAR; INTRAVENOUS; SUBCUTANEOUS at 19:46

## 2024-06-13 RX ADMIN — Medication 1 TABLET: at 08:52

## 2024-06-13 RX ADMIN — HYDROMORPHONE HYDROCHLORIDE 1 MG: 1 INJECTION, SOLUTION INTRAMUSCULAR; INTRAVENOUS; SUBCUTANEOUS at 16:32

## 2024-06-13 RX ADMIN — DOCUSATE SODIUM AND SENNOSIDES 2 TABLET: 8.6; 5 TABLET, FILM COATED ORAL at 04:58

## 2024-06-13 RX ADMIN — FOLIC ACID 1 MG: 1 TABLET ORAL at 08:52

## 2024-06-13 RX ADMIN — HYDROMORPHONE HYDROCHLORIDE 1 MG: 1 INJECTION, SOLUTION INTRAMUSCULAR; INTRAVENOUS; SUBCUTANEOUS at 04:58

## 2024-06-13 RX ADMIN — DIPHENHYDRAMINE HYDROCHLORIDE 25 MG: 25 CAPSULE ORAL at 08:56

## 2024-06-13 RX ADMIN — HYDROMORPHONE HYDROCHLORIDE 1 MG: 1 INJECTION, SOLUTION INTRAMUSCULAR; INTRAVENOUS; SUBCUTANEOUS at 12:36

## 2024-06-13 RX ADMIN — DOCUSATE SODIUM AND SENNOSIDES 2 TABLET: 8.6; 5 TABLET, FILM COATED ORAL at 16:32

## 2024-06-13 RX ADMIN — HYDROMORPHONE HYDROCHLORIDE 1 MG: 1 INJECTION, SOLUTION INTRAMUSCULAR; INTRAVENOUS; SUBCUTANEOUS at 01:30

## 2024-06-13 ASSESSMENT — PAIN SCALES - GENERAL
PAINLEVEL_OUTOF10: 0 - NO PAIN
PAINLEVEL_OUTOF10: 8
PAINLEVEL_OUTOF10: 0 - NO PAIN
PAINLEVEL_OUTOF10: 8
PAINLEVEL_OUTOF10: 8
PAINLEVEL_OUTOF10: 7
PAINLEVEL_OUTOF10: 8

## 2024-06-13 NOTE — PROGRESS NOTES
"Senait Narvaez is a 54 y.o. female on day 3 of admission presenting with Sickle cell anemia with pain (Multi).    Subjective   Patient seen at bedside. States that she continues to have generalized pain. Discussed lab results. Discussed possible discharge. Patient states that she would like to resume routine exchanges. Stated that Dr. Whaley will follow up outpatient to discuss scheduling these. Patient states that she would like to remain inpatient one more day for pain management.        Objective     Physical Exam  HENT:      Head: Normocephalic.      Nose: Nose normal.      Mouth/Throat:      Mouth: Mucous membranes are moist.   Eyes:      Pupils: Pupils are equal, round, and reactive to light.   Cardiovascular:      Rate and Rhythm: Normal rate.   Pulmonary:      Effort: Pulmonary effort is normal.   Abdominal:      Palpations: Abdomen is soft.   Genitourinary:     General: Normal vulva.   Musculoskeletal:         General: Normal range of motion.      Cervical back: Normal range of motion.   Skin:     General: Skin is warm.      Capillary Refill: Capillary refill takes less than 2 seconds.   Neurological:      General: No focal deficit present.      Mental Status: She is alert.   Psychiatric:         Mood and Affect: Mood normal.         Behavior: Behavior normal.         Last Recorded Vitals  Blood pressure 102/52, pulse 72, temperature 35.8 °C (96.4 °F), temperature source Temporal, resp. rate 16, height 1.626 m (5' 4\"), weight 109 kg (240 lb), SpO2 100%.  Intake/Output last 3 Shifts:  I/O last 3 completed shifts:  In: - (0 mL/kg)   Out: 650 (6 mL/kg) [Urine:650 (0.2 mL/kg/hr)]  Weight: 108.9 kg     Relevant Results                             Assessment/Plan   Principal Problem:    Sickle cell anemia with pain (Multi)    Senait Narvaez is a 53 year-old female with a PMHx of Hgb SC disease (previously on exchange transfusions q4-6 weeks, last transfusion 3/1/24), hx of SVC syndrome, recurrent DVT/PE (on lifelong " Coumadin), pHTN (6/2023), cauda equina with saddle numbness, hx SVT, fibromyalgia, Raynaud's disease, CKD II (baseline SCr~<2) and CAN who presents 2/28 from [facility] for uncontrolled acute on chronic sickle cell pain. Patient started on IV Dilaudid for pain management. Upon arrival to [facility] on admission, pt given naloxone 0.2mcg x1 dose for hypotension, hypoxia and drowsiness (6/10). Pt c/o bilateral flank pain on admit with associated oliguria, BL renal US ordered (6/10); results unremarkable, no hydronephrosis or nephrolithiasis. 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  - Previously managed through routine RBC exchanges q6 weeks, most recent RBCEx 3/1/24, per patient pausing on transfusions for time being  - OARRS reviewed, no aberrant behavior noted (Last refilled oxy 10mg tab qty 75, x12 days on 6/4/24)   - No leukocytosis on admit, afebrile, no s/s infectious on admit   - Hemolysis labs at baseline, Hbg baseline ~8-11; Hbg 9.6 on admit (6/10)  - CXR w/o evidence of acute process, atelectasis vs scarring (6/10)  - Care plan from 12/6/23- For mild to moderate pain: Dilaudid 0.5mg IVP q3h as needed, for moderate to severe pain Dilaudid 1- 1.5mg q3h PRN  - On arrival to [facility], naloxone 0.2mg administered for hypotension, hypoxia and drowsiness (6/10)  - For pain management - started on IV dilaudid 1 mg q3h PRN on admit (6/10- )  - bowel regimen for opioid induced constipation, zofran for opioid induced nausea, and benadrly for opioid induced pruritus; LBM 2/28  - c/w home Duloxetine 40mg daily, PO Zofran PRN, Folic Acid 1mg daily, and MVI daily     # pHTN   - 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 Yi and Dr. 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 (PFT to reevaluate for obstruction and small airway disease), and sleep apnea    - c/w home 2 L via CPAP at night   - Follows with pulmonology outpt     # Hx SVT  - Baseline admission EKG pending on admit   - ECHO (01/20/24) EF 60-65%  - c/w home 20mg lasix every other day   - Metoprolol Succs 12.5mg daily on hold until BP stabilizes (6/11) consider restarting 6/12  - Follows with Cardiology outpt, 2/13 most recent OP visit, tolerating all meds including lasix 20 mg every other day, metop tartrate 12.5mg BID to control SVT     # DVT/ PE/ SVC Thrombus  - Hx SVC stricture s/p SVC angioplasty  - B/l Upper Extremity and Facial Swelling d/t partial filling defect within the SVC and a thrombus of the SVC complicated by bilateral Mediport catheters. On lifelong anticoagulation, DOAC discouraged due to high risk of clotting   - Cont w/ home Coumadin 5mg daily  - Monitors INR with at home Coagcheck device  - Follows with Coumadin clinic outpatient  - Caution with fluids      # CKD II  # CHARLES-improved   - Baseline ~ SCr <2, 1.86 on admission (6/10)  - Thought to be related to hypotension mediated by metoprolol (hx of SVT) and furosemide (hx of heart failure)  - Follows with nephrology outpt - most recent appointment 1/11/24, check labs q6 mo, FUV in 1yr Nephrology   - pt c/o BL flank pain on admission (6/10) s/p 1L D5 1/2NS bolus  - BL renal US (6/11) results unremarkable, no hydronephrosis or nephrolithiasis  - Cr 2.14 (6.11) s/p 500ml LR bolus over 6 hours  - UA (6/11) pending     # CAN  - cont home CPAP   - cont home Albuterol PRN     # H/O Cauda Equine syndrome  - Follows Dr. Delacruz as outpatient     #Misc  - cont home Vit C 500mg, Vit D 50,000 weekly (Sunday), multivitamin daily  - cont home loratadine 10mg daily PRN     # DISPO  - Full code- confirmed on admission  - NOK: Tati Salgado (mother)  (#[phone])  - DC home resumed O2  - FUV on 6/19 w/ sickle cell clinic, 7/15 and 8/13 with Cardiology       I spent 45 minutes in the professional and overall care of this patient.      GRISEL Gomes-CNP  Patient discussed with dr. Cr

## 2024-06-14 ENCOUNTER — TELEPHONE (OUTPATIENT)
Dept: ADMISSION | Facility: HOSPITAL | Age: 54
End: 2024-06-14
Payer: COMMERCIAL

## 2024-06-14 VITALS
RESPIRATION RATE: 16 BRPM | DIASTOLIC BLOOD PRESSURE: 82 MMHG | SYSTOLIC BLOOD PRESSURE: 128 MMHG | HEIGHT: 64 IN | WEIGHT: 240 LBS | HEART RATE: 71 BPM | TEMPERATURE: 97.2 F | BODY MASS INDEX: 40.97 KG/M2 | OXYGEN SATURATION: 98 %

## 2024-06-14 DIAGNOSIS — D57.00 SICKLE CELL CRISIS (MULTI): ICD-10-CM

## 2024-06-14 LAB
ALBUMIN SERPL BCP-MCNC: 3.3 G/DL (ref 3.4–5)
ALP SERPL-CCNC: 94 U/L (ref 33–110)
ALT SERPL W P-5'-P-CCNC: 6 U/L (ref 7–45)
ANION GAP SERPL CALC-SCNC: 9 MMOL/L (ref 10–20)
AST SERPL W P-5'-P-CCNC: 12 U/L (ref 9–39)
BASOPHILS # BLD MANUAL: 0 X10*3/UL (ref 0–0.1)
BASOPHILS NFR BLD MANUAL: 0 %
BILIRUB SERPL-MCNC: 1.5 MG/DL (ref 0–1.2)
BUN SERPL-MCNC: 11 MG/DL (ref 6–23)
BURR CELLS BLD QL SMEAR: NORMAL
CALCIUM SERPL-MCNC: 8.8 MG/DL (ref 8.6–10.6)
CHLORIDE SERPL-SCNC: 103 MMOL/L (ref 98–107)
CO2 SERPL-SCNC: 34 MMOL/L (ref 21–32)
CREAT SERPL-MCNC: 0.88 MG/DL (ref 0.5–1.05)
DACRYOCYTES BLD QL SMEAR: NORMAL
EGFRCR SERPLBLD CKD-EPI 2021: 78 ML/MIN/1.73M*2
EOSINOPHIL # BLD MANUAL: 0.23 X10*3/UL (ref 0–0.7)
EOSINOPHIL NFR BLD MANUAL: 3.5 %
ERYTHROCYTE [DISTWIDTH] IN BLOOD BY AUTOMATED COUNT: 25.8 % (ref 11.5–14.5)
GLUCOSE SERPL-MCNC: 66 MG/DL (ref 74–99)
HCT VFR BLD AUTO: 27.1 % (ref 36–46)
HGB BLD-MCNC: 9.4 G/DL (ref 12–16)
HGB RETIC QN: 22 PG (ref 28–38)
HYPOCHROMIA BLD QL SMEAR: NORMAL
IMM GRANULOCYTES # BLD AUTO: 0.02 X10*3/UL (ref 0–0.7)
IMM GRANULOCYTES NFR BLD AUTO: 0.3 % (ref 0–0.9)
IMMATURE RETIC FRACTION: 51.1 %
LDH SERPL L TO P-CCNC: 199 U/L (ref 84–246)
LYMPHOCYTES # BLD MANUAL: 1.29 X10*3/UL (ref 1.2–4.8)
LYMPHOCYTES NFR BLD MANUAL: 19.3 %
MCH RBC QN AUTO: 26.5 PG (ref 26–34)
MCHC RBC AUTO-ENTMCNC: 34.7 G/DL (ref 32–36)
MCV RBC AUTO: 76 FL (ref 80–100)
MONOCYTES # BLD MANUAL: 0.36 X10*3/UL (ref 0.1–1)
MONOCYTES NFR BLD MANUAL: 5.3 %
NEUTS SEG # BLD MANUAL: 4.82 X10*3/UL (ref 1.2–7)
NEUTS SEG NFR BLD MANUAL: 71.9 %
NRBC BLD-RTO: 0.9 /100 WBCS (ref 0–0)
OVALOCYTES BLD QL SMEAR: NORMAL
PLATELET # BLD AUTO: 268 X10*3/UL (ref 150–450)
POLYCHROMASIA BLD QL SMEAR: NORMAL
POTASSIUM SERPL-SCNC: 4.4 MMOL/L (ref 3.5–5.3)
PROT SERPL-MCNC: 6 G/DL (ref 6.4–8.2)
RBC # BLD AUTO: 3.55 X10*6/UL (ref 4–5.2)
RBC MORPH BLD: NORMAL
RETICS #: 0.11 X10*6/UL (ref 0.02–0.08)
RETICS/RBC NFR AUTO: 3.1 % (ref 0.5–2)
SCHISTOCYTES BLD QL SMEAR: NORMAL
SODIUM SERPL-SCNC: 142 MMOL/L (ref 136–145)
TARGETS BLD QL SMEAR: NORMAL
TOTAL CELLS COUNTED BLD: 114
WBC # BLD AUTO: 6.7 X10*3/UL (ref 4.4–11.3)

## 2024-06-14 PROCEDURE — 85007 BL SMEAR W/DIFF WBC COUNT: CPT

## 2024-06-14 PROCEDURE — 36415 COLL VENOUS BLD VENIPUNCTURE: CPT

## 2024-06-14 PROCEDURE — 83615 LACTATE (LD) (LDH) ENZYME: CPT

## 2024-06-14 PROCEDURE — 85027 COMPLETE CBC AUTOMATED: CPT

## 2024-06-14 PROCEDURE — 2500000001 HC RX 250 WO HCPCS SELF ADMINISTERED DRUGS (ALT 637 FOR MEDICARE OP)

## 2024-06-14 PROCEDURE — 94660 CPAP INITIATION&MGMT: CPT

## 2024-06-14 PROCEDURE — 99239 HOSP IP/OBS DSCHRG MGMT >30: CPT | Performed by: STUDENT IN AN ORGANIZED HEALTH CARE EDUCATION/TRAINING PROGRAM

## 2024-06-14 PROCEDURE — 84075 ASSAY ALKALINE PHOSPHATASE: CPT

## 2024-06-14 PROCEDURE — 2500000004 HC RX 250 GENERAL PHARMACY W/ HCPCS (ALT 636 FOR OP/ED)

## 2024-06-14 PROCEDURE — 85045 AUTOMATED RETICULOCYTE COUNT: CPT

## 2024-06-14 RX ORDER — OXYCODONE HYDROCHLORIDE 10 MG/1
10 TABLET ORAL EVERY 4 HOURS PRN
Qty: 75 TABLET | Refills: 0 | Status: SHIPPED | OUTPATIENT
Start: 2024-06-14

## 2024-06-14 RX ADMIN — HYDROMORPHONE HYDROCHLORIDE 1 MG: 1 INJECTION, SOLUTION INTRAMUSCULAR; INTRAVENOUS; SUBCUTANEOUS at 05:11

## 2024-06-14 RX ADMIN — FOLIC ACID 1 MG: 1 TABLET ORAL at 08:17

## 2024-06-14 RX ADMIN — HYDROMORPHONE HYDROCHLORIDE 1 MG: 1 INJECTION, SOLUTION INTRAMUSCULAR; INTRAVENOUS; SUBCUTANEOUS at 11:25

## 2024-06-14 RX ADMIN — Medication 1 TABLET: at 08:17

## 2024-06-14 RX ADMIN — HYDROMORPHONE HYDROCHLORIDE 1 MG: 1 INJECTION, SOLUTION INTRAMUSCULAR; INTRAVENOUS; SUBCUTANEOUS at 01:43

## 2024-06-14 RX ADMIN — DOCUSATE SODIUM AND SENNOSIDES 2 TABLET: 8.6; 5 TABLET, FILM COATED ORAL at 05:11

## 2024-06-14 RX ADMIN — HYDROMORPHONE HYDROCHLORIDE 1 MG: 1 INJECTION, SOLUTION INTRAMUSCULAR; INTRAVENOUS; SUBCUTANEOUS at 08:10

## 2024-06-14 RX ADMIN — Medication 500 MG: at 09:00

## 2024-06-14 ASSESSMENT — PAIN SCALES - GENERAL
PAINLEVEL_OUTOF10: 0 - NO PAIN
PAINLEVEL_OUTOF10: 0 - NO PAIN
PAINLEVEL_OUTOF10: 7
PAINLEVEL_OUTOF10: 6
PAINLEVEL_OUTOF10: 8
PAINLEVEL_OUTOF10: 6
PAINLEVEL_OUTOF10: 8
PAINLEVEL_OUTOF10: 8

## 2024-06-14 ASSESSMENT — ACTIVITIES OF DAILY LIVING (ADL): LACK_OF_TRANSPORTATION: PATIENT DECLINED

## 2024-06-14 NOTE — DISCHARGE SUMMARY
Discharge Diagnosis  Sickle cell anemia with pain (Multi)    Issues Requiring Follow-Up  Pain management plan with Dr. Mackey    Test Results Pending At Discharge  Pending Labs       No current pending labs.            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 Patient started on IV Dilaudid for pain management. Upon arrival to [facility] on admission, pt given naloxone 0.2mcg x1 dose for hypotension, hypoxia and drowsiness (6/10). Pt c/o bilateral flank pain on admit with associated oliguria, BL renal US ordered (6/10); results unremarkable, no hydronephrosis or nephrolithiasis. DC home after pain and CHARLES improvement.    Pertinent Physical Exam At Time of Discharge  Physical Exam  Constitutional:       Appearance: Normal appearance.   HENT:      Head: Normocephalic.      Nose: Nose normal.      Mouth/Throat:      Mouth: Mucous membranes are moist.   Eyes:      Pupils: Pupils are equal, round, and reactive to light.   Cardiovascular:      Rate and Rhythm: Normal rate and regular rhythm.   Pulmonary:      Effort: Pulmonary effort is normal.   Abdominal:      General: Abdomen is flat.   Musculoskeletal:         General: Normal range of motion.      Cervical back: Normal range of motion.   Skin:     General: Skin is warm.   Neurological:      General: No focal deficit present.      Mental Status: She is alert.   Psychiatric:         Mood and Affect: Mood normal.         Home Medications     Medication List      CONTINUE taking these medications     albuterol 90 mcg/actuation inhaler; Inhale 2 puffs every 6 hours if   needed for wheezing.   ascorbic acid  500 mg chewable tablet; Commonly known as: Vitamin C   elderberry fruit 350 mg capsule   ergocalciferol 1.25 MG (05633 UT) capsule; Commonly known as: Vitamin   D-2; Take 1 capsule (50,000 Units) by mouth 1 (one) time per week for 8   doses.   fluticasone 50 mcg/actuation nasal spray; Commonly known as: Flonase   folic acid 1 mg tablet; Commonly known as: Folvite   furosemide 20 mg tablet; Commonly known as: Lasix   loratadine 10 mg tablet; Commonly known as: Claritin   metoprolol tartrate 25 mg tablet; Commonly known as: Lopressor; Take 0.5   tablets (12.5 mg) by mouth 2 times a day.   multivitamin with minerals tablet   naloxone 4 mg/0.1 mL nasal spray; Commonly known as: Narcan; Administer   1 spray (4 mg) into affected nostril(s) if needed for opioid reversal. May   repeat every 2-3 minutes if needed, alternating nostrils, until medical   assistance becomes available.   ondansetron 4 mg tablet; Commonly known as: Zofran; Take 1 tablet (4 mg)   by mouth every 8 hours if needed for nausea or vomiting.   oxyCODONE 10 mg immediate release tablet; Commonly known as: Roxicodone;   Take 1 tablet (10 mg) by mouth every 4 hours if needed for severe pain (7   - 10) (pain).   oxygen gas therapy; Commonly known as: O2; Inhale 1 each continuously.   sennosides-docusate sodium 8.6-50 mg tablet; Commonly known as:   Nita-Colace   warfarin 5 mg tablet; Commonly known as: Coumadin; Take as directed. If   you are unsure how to take this medication, talk to your nurse or doctor.;   Original instructions: Take 1 tablet (5 mg) by mouth once daily in the   evening.       Outpatient Follow-Up  Future Appointments   Date Time Provider Department Center   6/19/2024 11:30 AM Erich Whaley MD [id number] [facility]   7/8/2024 10:00 AM Rhett Yu MD [id number] Academic   7/15/2024 10:20 AM DO DANIEL Nguyễn [facility]   8/13/2024 11:40 AM DO STANFORD Nguy[address]       Alicia Grijalva MD

## 2024-06-14 NOTE — PROGRESS NOTES
06/14/24 1100   Discharge Planning   Living Arrangements Alone   Support Systems Children;Family members   Assistance Needed nocturnal O2   Type of Residence Private residence   Home or Post Acute Services None   Financial Resource Strain   How hard is it for you to pay for the very basics like food, housing, medical care, and heating? Pt Declined   Housing Stability   In the last 12 months, was there a time when you were not able to pay the mortgage or rent on time? Pt Declined   In the last 12 months, was there a time when you did not have a steady place to sleep or slept in a shelter (including now)? Pt Declined   Transportation Needs   In the past 12 months, has lack of transportation kept you from medical appointments or from getting medications? Pt Declined   In the past 12 months, has lack of transportation kept you from meetings, work, or from getting things needed for daily living? Pt Declined     FLO was notified that pt needed assistance with transport for discharge. FLO met with pt and confirmed destination address and contact details and let pt know that it is likely that transport will be in contact with her via text or call.   FLO called pt's insurance for transport. Pt's pickup window is [phone]; transportation dispatch reported that there are no [id number] vehicles available so they will send a Lyft/[employer] with updates sent to pt's cell phone.   FLO and bedside nurse confirmed with care team that pt is medically ready.   FLO met with pt and updated her on transport time.   Discharge orders are in. SW will follow for final discharge needs.  Narendra Hunter [facility], LSW.     06/14/2024 1230  Pt and bedside RN were heading down to the lobby when pt got a text that the ride was canceled.   FLO called pt's insurance again and new transport was arranged. [id number] will be at [facility] to transport pt home at 1320.  Bedside RN notified, pt will be taken down to the lobby around 1300.  FLO will follow.  Narednra Hunter [facility], LSW.

## 2024-06-14 NOTE — TELEPHONE ENCOUNTER
Senait Narvaez called the refill line for Oxycodone 10mg. Requesting refills be sent to [facility] pharmacy; message sent to Sickle Cell team to submit.   Patient states she is being discharged home today.

## 2024-06-17 NOTE — PROGRESS NOTES
PER [facility] PT DISCHARGED HOME 6/14/24. SPOKE TO PT. SHE IS UNABLE TO TEST TODAY BUT WILL TEST FIRST THING TOMORROW MORNING. (6/18).

## 2024-06-18 ENCOUNTER — ANTICOAGULATION - WARFARIN VISIT (OUTPATIENT)
Dept: CARDIOLOGY | Facility: CLINIC | Age: 54
End: 2024-06-18
Payer: COMMERCIAL

## 2024-06-18 DIAGNOSIS — I82.210 THROMBOSIS OF SUPERIOR VENA CAVA (MULTI): ICD-10-CM

## 2024-06-18 DIAGNOSIS — I26.99 RECURRENT PULMONARY EMBOLISM (MULTI): ICD-10-CM

## 2024-06-18 DIAGNOSIS — I82.409 DEEP VEIN THROMBOSIS (DVT) OF LOWER EXTREMITY, UNSPECIFIED CHRONICITY, UNSPECIFIED LATERALITY, UNSPECIFIED VEIN (MULTI): Primary | ICD-10-CM

## 2024-06-18 LAB
INR IN PPP BY COAGULATION ASSAY EXTERNAL: 1.9 (ref 2–3)
PROTHROMBIN TIME (PT) IN PPP BY COAGULATION ASSAY EXTERNAL: ABNORMAL SECONDS

## 2024-06-18 NOTE — PROGRESS NOTES
Patient identification verified with 2 identifiers.    Location: [facility] Patient Self-Testing Program [phone]    Referring Physician: TOMAS Albert   Enrollment/ Re-enrollment date: 2024   INR Goal: 2.0-3.0  INR monitoring is per [facility] protocol.  Anticoagulation Medication: warfarin  Indication: Deep Vein Thrombosis (DVT) and Pulmonary Embolism (PE)    Subjective   Bleeding signs/symptoms: No    Bruising: No   Major bleeding event: No  Thrombosis signs/symptoms: No  Thromboembolic event: No  Missed doses: No  Extra doses: No  Medication changes: No  Dietary changes: No  Change in health: Yes  Change in activity: No  Alcohol: No  Other concerns: No    Upcoming Procedures:  Does the Patient Have any upcoming procedures that require interruption in anticoagulation therapy? no  Does the patient require bridging? no      Anticoagulation Summary  As of 2024      INR goal:  2.0-3.0   TTR:  84.2% (6 mo)   INR used for dosin.90 (2024)   Weekly warfarin total:  37.5 mg               Assessment/Plan   Subtherapeutic     1. New dose:  TWD increased approximately 5% per protocol.  Pt states understanding.     2. Next INR: 1 week      Education provided to patient during the visit:  Patient instructed to call in interim with questions, concerns and changes.   Patient educated on interactions between medications and warfarin.   Patient educated on dietary consistency in vitamin k consumption.   Patient educated on signs of bleeding/clotting.   Patient educated on compliance with dosing, follow up appointments, and prescribed plan of care.

## 2024-06-19 ENCOUNTER — OFFICE VISIT (OUTPATIENT)
Dept: HEMATOLOGY/ONCOLOGY | Facility: HOSPITAL | Age: 54
End: 2024-06-19
Payer: COMMERCIAL

## 2024-06-19 VITALS
SYSTOLIC BLOOD PRESSURE: 148 MMHG | RESPIRATION RATE: 16 BRPM | TEMPERATURE: 98.1 F | HEART RATE: 70 BPM | DIASTOLIC BLOOD PRESSURE: 65 MMHG | WEIGHT: 237.66 LBS | BODY MASS INDEX: 40.79 KG/M2 | OXYGEN SATURATION: 93 %

## 2024-06-19 DIAGNOSIS — D57.219 SICKLE-CELL-HEMOGLOBIN C DISEASE WITH CRISIS (MULTI): ICD-10-CM

## 2024-06-19 DIAGNOSIS — O22.30 DVT (DEEP VEIN THROMBOSIS) IN PREGNANCY (HHS-HCC): Primary | ICD-10-CM

## 2024-06-19 DIAGNOSIS — G47.33 OSA ON CPAP: ICD-10-CM

## 2024-06-19 DIAGNOSIS — I82.90 VTE (VENOUS THROMBOEMBOLISM): ICD-10-CM

## 2024-06-19 DIAGNOSIS — K59.00 CONSTIPATION, UNSPECIFIED CONSTIPATION TYPE: ICD-10-CM

## 2024-06-19 PROCEDURE — 99215 OFFICE O/P EST HI 40 MIN: CPT | Performed by: PEDIATRICS

## 2024-06-19 PROCEDURE — 3008F BODY MASS INDEX DOCD: CPT | Performed by: PEDIATRICS

## 2024-06-19 ASSESSMENT — ENCOUNTER SYMPTOMS
DEPRESSION: 0
FATIGUE: 1
DIAPHORESIS: 0
NERVOUS/ANXIOUS: 0
ARTHRALGIAS: 1
EYE PROBLEMS: 0
COUGH: 0
BLOOD IN STOOL: 0
HEMATURIA: 0
HEMOPTYSIS: 0
BACK PAIN: 0
CHILLS: 0
FEVER: 0
MYALGIAS: 0
ABDOMINAL PAIN: 0
LIGHT-HEADEDNESS: 0
DIARRHEA: 0
FREQUENCY: 0
WOUND: 0
LEG SWELLING: 0
PALPITATIONS: 0
WHEEZING: 0
CHEST TIGHTNESS: 0
FLANK PAIN: 0
SCLERAL ICTERUS: 0
HEADACHES: 0
NUMBNESS: 0
SORE THROAT: 0
CONSTIPATION: 0
NAUSEA: 0
DYSURIA: 0
BRUISES/BLEEDS EASILY: 0
EXTREMITY WEAKNESS: 0
ADENOPATHY: 0
VOMITING: 0

## 2024-06-19 ASSESSMENT — PAIN SCALES - GENERAL: PAINLEVEL: 6

## 2024-06-19 NOTE — PROGRESS NOTES
SICKLE CELL OUTPATIENT NOTE  Patient ID: Senait Narvaez   Visit Type: Follow up visit     ASSESSMENT AND PLAN  Senait Narvaez is 54 y.o. 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 No changes in her home analgesics. She has a history of nocturnal hypoxia and on supplemental oxygen  Oral tylenol 650 mg every 6 hours prn for mild to moderate pain   Oral oxycodone 10-20 mg every 4-6 hours as needed for moderate to severe and breakthrough pain   Oral duloxetine 40 mg daily for chronic pain and also anxiety/depression   Non pharmacologic methods of pain control   Reviewed O[facility]  Continue 2L night time oxygen   Care plan updated     Encounter for management of disease modifying therapy. Senait Narvaez was previously on chronic full automated red cell exchange transfusions which was discontinued in April of 2024 per her request. We will continue to hold off exchange transfusion and review at every visit. When do do resume, we will give this every 8 weeks as an outpatient and she is agreeable to this.      Chronic anemia secondary to SCD with a hemoglobin of 9.4 g/dl. This is around her baseline    Daily folic acid     History of recurrent VTE/PE with SVC syndrome, on extended duration anticoagulation with warfarin and continues to tolerate this without any complications.   Oral warfarin 5 mg daily with target INR of 2-3      Constipation is well controlled  Continue current laxatives with colace and miralax    Follow up office visit will be in 4 weeks time     Chief Complaint: Follow up for SCD on chronic red cell exchange transfusion      Interval History: Senait is present with for hospital follow up of SCD. She was admitted from hospital from 6/10-6/14 for acute sickle cell pain which has resolved and chronic pain is at her baseline of 5-6/10, generalized and in her large joints. Red cell exchange transfusion is on hold. Facial and neck swelling are unchanged from prior. She remains on  warfarin as anticoagulation for recurrent VTE, which she is tolerating without increased bruising or bleeding. Her INR has been therapeutic between 2-3. She denies palpitations, chest pain or chest tightness, headaches, dizziness, nausea or vomiting. Bronchial asthma has been well controlled. She is afebrile. She is stooling well with current laxatives. She has been compliant with supplemental oxygen 2L at night and has been compliant    Review of System:   Review of Systems   Constitutional:  Positive for fatigue. Negative for chills, diaphoresis and fever.   HENT:   Negative for mouth sores, nosebleeds and sore throat.    Eyes:  Negative for eye problems and icterus.   Respiratory:  Negative for chest tightness, cough, hemoptysis and wheezing.    Cardiovascular:  Negative for chest pain, leg swelling and palpitations.   Gastrointestinal:  Negative for abdominal pain, blood in stool, constipation, diarrhea, nausea and vomiting.   Genitourinary:  Negative for dysuria, frequency and hematuria.    Musculoskeletal:  Positive for arthralgias. Negative for back pain, flank pain, gait problem and myalgias.   Skin:  Negative for itching, rash and wound.   Neurological:  Negative for extremity weakness, gait problem, headaches, light-headedness and numbness.   Hematological:  Negative for adenopathy. Does not bruise/bleed easily.   Psychiatric/Behavioral:  Negative for depression. The patient is not nervous/anxious.         Allergies:   No Known Allergies    Current Medications:   Outpatient Encounter Medications as of 4/10/2024   Medication Sig    albuterol 90 mcg/actuation inhaler Inhale 2 puffs every 6 hours if needed for wheezing.    ascorbic acid (Vitamin C) 500 mg chewable tablet Chew 1 tablet (500 mg) once daily. As needed    elderberry fruit 350 mg capsule Take 1 capsule by mouth once daily.    fluticasone (Flonase) 50 mcg/actuation nasal spray Administer 2 sprays into each nostril if needed.    folic acid (Folvite)  1 mg tablet Take 1 tablet (1 mg) by mouth once daily.    furosemide (Lasix) 20 mg tablet Take 1 tablet (20 mg) by mouth every other day.    loratadine (Claritin) 10 mg tablet Take 1 tablet (10 mg) by mouth once daily as needed for allergies.    metoprolol tartrate (Lopressor) 25 mg tablet Take 0.5 tablets (12.5 mg) by mouth 2 times a day.    multivitamin with minerals tablet Take 1 tablet by mouth once daily.    naloxone (Narcan) 4 mg/0.1 mL nasal spray Administer 1 spray (4 mg) into affected nostril(s) if needed for opioid reversal. May repeat every 2-3 minutes if needed, alternating nostrils, until medical assistance becomes available.    ondansetron (Zofran) 4 mg tablet Take 1 tablet (4 mg) by mouth every 8 hours if needed for nausea or vomiting.    oxyCODONE (Roxicodone) 10 mg immediate release tablet Take 1 tablet (10 mg) by mouth every 4 hours if needed for severe pain (7 - 10) (pain).    oxygen (O2) gas therapy Inhale 1 each continuously.    sennosides-docusate sodium (Nita-Colace) 8.6-50 mg tablet Take 2 tablets by mouth every 12 hours.    warfarin (Coumadin) 5 mg tablet Take 1 tablet (5 mg) by mouth once daily in the evening.     Past Medical History:   She has a past medical history of Asthma ([facility]), CHF (congestive heart failure) ([facility]), Chronic pain disorder, Compression of vein (02/19/2020), and Hypotension, unspecified (12/10/2020).    Past Surgical History:   She has a past surgical history that includes Appendectomy (08/05/2013); Cholecystectomy (08/05/2013); Other surgical history (05/13/2014); Other surgical history (10/09/2014); Other surgical history (06/09/2014); MR angio head wo IV contrast (8/16/2014); MR angio neck wo IV contrast (8/16/2014); IR CVC tunneled (3/13/2015); IR CVC tunneled (1/29/2016); IR CVC tunneled (1/17/2018); IR CVC tunneled (2/22/2018); IR CVC tunneled (3/22/2018); IR CVC tunneled (4/18/2018); IR CVC tunneled (5/16/2018); IR CVC tunneled (6/12/2018); US guided biopsy  lymph node superficial (9/17/2019); CT guided percutaneous biopsy LYMPH node superficial (9/19/2019); IR CVC tunneled (1/21/2020); IR CVC tunneled (2/28/2020); IR CVC tunneled (4/10/2020); IR CVC tunneled (5/22/2020); IR CVC tunneled (6/19/2020); IR CVC tunneled (7/23/2020); IR CVC tunneled (9/4/2020); IR CVC tunneled (10/9/2020); IR CVC tunneled (11/13/2020); IR CVC tunneled (12/18/2020); IR CVC tunneled (1/22/2021); IR CVC tunneled (2/26/2021); IR CVC tunneled (4/2/2021); IR CVC tunneled (5/7/2021); IR CVC tunneled (6/11/2021); IR CVC tunneled (7/16/2021); IR CVC tunneled (8/20/2021); IR CVC tunneled (9/24/2021); IR CVC tunneled (10/29/2021); IR CVC tunneled (12/3/2021); IR CVC tunneled (1/7/2022); IR CVC tunneled (2/23/2022); IR CVC tunneled (3/25/2022); IR CVC tunneled (4/22/2022); IR CVC tunneled (6/3/2022); IR CVC tunneled (9/18/2017); IR CVC tunneled (10/30/2017); IR CVC tunneled (12/19/2017); IR CVC tunneled (1/6/2023); IR CVC tunneled (2/24/2023); IR CVC tunneled (7/8/2022); IR CVC tunneled (8/12/2022); IR CVC tunneled (9/16/2022); CT angio neck (10/19/2022); IR CVC tunneled (10/20/2022); IR CVC tunneled (11/29/2022); IR CVC tunneled (3/31/2023); IR CVC tunneled (6/9/2023); IR CVC tunneled (7/20/2023); IR CVC tunneled (8/16/2023); IR CVC tunneled (9/21/2023); IR CVC nontunneled (10/26/2023); and IR CVC nontunneled (12/7/2023).    Family History:   Family History   Problem Relation    Diabetes Father    Gout Father    Sickle cell trait Father    Seizures Sister    Diabetes Other    Uterine cancer Other    Other (congenital blindness) Cousin     Social History:   Senait Narvaez  reports that she quit smoking about 10 years ago. Her smoking use included cigarettes. She has been exposed to tobacco smoke. She has never used smokeless tobacco.  reports that she does not currently use drugs.    EXAMINATION FINDINGS   /65 (BP Location: Left arm, Patient Position: Sitting)   Pulse 70   Temp 36.7 °C (98.1 °F)    Resp 16   Wt 108 kg (237 lb 10.5 oz)   SpO2 93%   BMI 40.79 kg/m²   2.21 meters squared    Physical Exam  Vitals and nursing note reviewed.   Constitutional:       General: She is not in acute distress.     Appearance: She is not toxic-appearing.   Neck:      Vascular: No carotid bruit.      Comments: SVC syndrome with distension of her neck veins  Cardiovascular:      Rate and Rhythm: Normal rate and regular rhythm.      Pulses: Normal pulses.      Heart sounds: No murmur heard.     No gallop.   Pulmonary:      Effort: Pulmonary effort is normal. No respiratory distress.      Breath sounds: Normal breath sounds. No wheezing.   Abdominal:      General: Bowel sounds are normal.      Palpations: Abdomen is soft.      Tenderness: There is no abdominal tenderness. There is no guarding.   Musculoskeletal:         General: No swelling or deformity.      Cervical back: No rigidity or tenderness.      Right lower leg: No edema.      Left lower leg: No edema.   Lymphadenopathy:      Cervical: No cervical adenopathy.   Skin:     General: Skin is warm.      Capillary Refill: Capillary refill takes less than 2 seconds.      Findings: No lesion or rash.   Neurological:      General: No focal deficit present.      Mental Status: She is alert and oriented to person, place, and time. Mental status is at baseline.      Cranial Nerves: No cranial nerve deficit.      Motor: No weakness.      Gait: Gait normal.   Psychiatric:         Mood and Affect: Mood normal.         Behavior: Behavior normal.       LABS   Anticoagulation - Warfarin Visit on 06/18/2024   Component Date Value Ref Range Status    INR External 06/18/2024 1.90 (A)  2 - 3 Final              Erich Whaley MD

## 2024-06-21 ENCOUNTER — ANTICOAGULATION - WARFARIN VISIT (OUTPATIENT)
Dept: CARDIOLOGY | Facility: CLINIC | Age: 54
End: 2024-06-21
Payer: COMMERCIAL

## 2024-06-21 DIAGNOSIS — I26.99 RECURRENT PULMONARY EMBOLISM (MULTI): Primary | ICD-10-CM

## 2024-06-21 DIAGNOSIS — I82.210 THROMBOSIS OF SUPERIOR VENA CAVA (MULTI): ICD-10-CM

## 2024-06-21 DIAGNOSIS — I82.4Z9 DEEP VEIN THROMBOSIS (DVT) OF DISTAL VEIN OF LOWER EXTREMITY, UNSPECIFIED CHRONICITY, UNSPECIFIED LATERALITY (MULTI): ICD-10-CM

## 2024-06-25 ENCOUNTER — ANTICOAGULATION - WARFARIN VISIT (OUTPATIENT)
Dept: CARDIOLOGY | Facility: CLINIC | Age: 54
End: 2024-06-25
Payer: COMMERCIAL

## 2024-06-25 DIAGNOSIS — I26.99 RECURRENT PULMONARY EMBOLISM (MULTI): ICD-10-CM

## 2024-06-25 DIAGNOSIS — I82.210 THROMBOSIS OF SUPERIOR VENA CAVA (MULTI): ICD-10-CM

## 2024-06-25 DIAGNOSIS — I82.4Z9 DEEP VEIN THROMBOSIS (DVT) OF DISTAL VEIN OF LOWER EXTREMITY, UNSPECIFIED CHRONICITY, UNSPECIFIED LATERALITY (MULTI): Primary | ICD-10-CM

## 2024-06-25 LAB
INR IN PPP BY COAGULATION ASSAY EXTERNAL: 2.8 (ref 2–3)
PROTHROMBIN TIME (PT) IN PPP BY COAGULATION ASSAY EXTERNAL: NORMAL SECONDS

## 2024-06-25 NOTE — PROGRESS NOTES
Patient identification verified with 2 identifiers.    Location: [facility] Patient Self-Testing Program [phone]    Referring Physician: TOMAS Albert   Enrollment/ Re-enrollment date: 2024   INR Goal: 2.0-3.0  INR monitoring is per [facility] protocol.  Anticoagulation Medication: warfarin  Indication: Deep Vein Thrombosis (DVT) and Pulmonary Embolism (PE)    Subjective   Bleeding signs/symptoms: No    Bruising: No   Major bleeding event: No  Thrombosis signs/symptoms: No  Thromboembolic event: No  Missed doses: No  Extra doses: No  Medication changes: No  Dietary changes: No  Change in health: No  Change in activity: No  Alcohol: No  Other concerns: No    Upcoming Procedures:  Does the Patient Have any upcoming procedures that require interruption in anticoagulation therapy? no  Does the patient require bridging? no      Anticoagulation Summary  As of 2024      INR goal:  2.0-3.0   TTR:  84.3% (6.2 mo)   INR used for dosin.80 (2024)   Weekly warfarin total:  37.5 mg               Assessment/Plan   Therapeutic     1. New dose: no change    2. Next INR: 1 week      Education provided to patient during the visit:  Patient instructed to call in interim with questions, concerns and changes.   Patient educated on interactions between medications and warfarin.   Patient educated on dietary consistency in vitamin k consumption.   Patient educated on signs of bleeding/clotting.   Patient educated on compliance with dosing, follow up appointments, and prescribed plan of care.

## 2024-06-27 ENCOUNTER — TELEPHONE (OUTPATIENT)
Dept: ADMISSION | Facility: HOSPITAL | Age: 54
End: 2024-06-27
Payer: COMMERCIAL

## 2024-06-27 DIAGNOSIS — D57.00 SICKLE CELL CRISIS (MULTI): ICD-10-CM

## 2024-06-27 RX ORDER — OXYCODONE HYDROCHLORIDE 10 MG/1
10 TABLET ORAL EVERY 4 HOURS PRN
Qty: 75 TABLET | Refills: 0 | Status: SHIPPED | OUTPATIENT
Start: 2024-06-27

## 2024-06-27 NOTE — TELEPHONE ENCOUNTER
Pt is requesting a refill on her Oxycodone 10mg to be sent to [facility] on file. Message sent to the team.

## 2024-07-01 ENCOUNTER — TELEPHONE (OUTPATIENT)
Dept: CARDIOLOGY | Facility: CLINIC | Age: 54
End: 2024-07-01
Payer: COMMERCIAL

## 2024-07-01 DIAGNOSIS — I26.99 RECURRENT PULMONARY EMBOLISM (MULTI): ICD-10-CM

## 2024-07-01 DIAGNOSIS — I82.210 THROMBOSIS OF SUPERIOR VENA CAVA (MULTI): ICD-10-CM

## 2024-07-01 DIAGNOSIS — I82.4Z9 DEEP VEIN THROMBOSIS (DVT) OF DISTAL VEIN OF LOWER EXTREMITY, UNSPECIFIED CHRONICITY, UNSPECIFIED LATERALITY (MULTI): Primary | ICD-10-CM

## 2024-07-01 NOTE — TELEPHONE ENCOUNTER
Pt has new referring. [facility] order faxed and  Electronic Renewal sent to Dr. Erich Whaley MD for signature and return to [facility] for processing.  Confirmed with Dr. Whaley on telephone he is new referring.

## 2024-07-02 ENCOUNTER — ANTICOAGULATION - WARFARIN VISIT (OUTPATIENT)
Dept: CARDIOLOGY | Facility: CLINIC | Age: 54
End: 2024-07-02
Payer: COMMERCIAL

## 2024-07-02 DIAGNOSIS — I82.4Z9 DEEP VEIN THROMBOSIS (DVT) OF DISTAL VEIN OF LOWER EXTREMITY, UNSPECIFIED CHRONICITY, UNSPECIFIED LATERALITY (MULTI): ICD-10-CM

## 2024-07-02 DIAGNOSIS — I26.99 RECURRENT PULMONARY EMBOLISM (MULTI): ICD-10-CM

## 2024-07-02 DIAGNOSIS — I82.509 CHRONIC DEEP VEIN THROMBOSIS (DVT) OF LOWER EXTREMITY, UNSPECIFIED LATERALITY, UNSPECIFIED VEIN (MULTI): Primary | ICD-10-CM

## 2024-07-02 DIAGNOSIS — I82.210 THROMBOSIS OF SUPERIOR VENA CAVA (MULTI): ICD-10-CM

## 2024-07-02 LAB
INR IN PPP BY COAGULATION ASSAY EXTERNAL: 2.9 (ref 2–3)
PROTHROMBIN TIME (PT) IN PPP BY COAGULATION ASSAY EXTERNAL: NORMAL SECONDS

## 2024-07-02 NOTE — PROGRESS NOTES
Patient identification verified with 2 identifiers.    Location: [facility] Patient Self-Testing Program [phone]    Referring Physician: Dr. Erich Whaley   Enrollment/ Re-enrollment date: 2025   INR Goal: 2.0-3.0  INR monitoring is per [facility] protocol.  Anticoagulation Medication: warfarin  Indication: Deep Vein Thrombosis (DVT) and Pulmonary Embolism (PE)    Subjective   Bleeding signs/symptoms: No    Bruising: No   Major bleeding event: No  Thrombosis signs/symptoms: No  Thromboembolic event: No  Missed doses: No  Extra doses: No  Medication changes: No  Dietary changes: No  Change in health: No  Change in activity: No  Alcohol: No  Other concerns: No    Upcoming Procedures:  Does the Patient Have any upcoming procedures that require interruption in anticoagulation therapy? no  Does the patient require bridging? no      Anticoagulation Summary  As of 2024      INR goal:  2.0-3.0   TTR:  84.9% (6.5 mo)   INR used for dosin.90 (2024)   Weekly warfarin total:  37.5 mg               Assessment/Plan   Therapeutic     1. New dose: no change    2. Next INR: 1 week      Education provided to patient during the visit:  Patient instructed to call in interim with questions, concerns and changes.   Patient educated on interactions between medications and warfarin.   Patient educated on dietary consistency in vitamin k consumption.   Patient educated on signs of bleeding/clotting.   Patient educated on compliance with dosing, follow up appointments, and prescribed plan of care.

## 2024-07-08 ENCOUNTER — APPOINTMENT (OUTPATIENT)
Dept: RHEUMATOLOGY | Facility: CLINIC | Age: 54
End: 2024-07-08
Payer: COMMERCIAL

## 2024-07-09 ENCOUNTER — TELEPHONE (OUTPATIENT)
Dept: CARDIOLOGY | Facility: CLINIC | Age: 54
End: 2024-07-09
Payer: COMMERCIAL

## 2024-07-09 NOTE — TELEPHONE ENCOUNTER
Received signed order from Dr. Whaley and faxed to [facility] for processing/update.  Scanned into patient's chart also.

## 2024-07-11 ENCOUNTER — TELEPHONE (OUTPATIENT)
Dept: HEMATOLOGY/ONCOLOGY | Facility: HOSPITAL | Age: 54
End: 2024-07-11
Payer: COMMERCIAL

## 2024-07-11 DIAGNOSIS — D57.00 SICKLE CELL CRISIS (MULTI): ICD-10-CM

## 2024-07-11 RX ORDER — OXYCODONE HYDROCHLORIDE 10 MG/1
10 TABLET ORAL EVERY 4 HOURS PRN
Qty: 75 TABLET | Refills: 0 | Status: SHIPPED | OUTPATIENT
Start: 2024-07-11

## 2024-07-11 NOTE — TELEPHONE ENCOUNTER
Pt is requesting a refill of oxycodone 10mg q4 prn, #75.  Last prescription was written 6/27/24.  Preferred pharmacy is [facility] in [place].
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
## 2024-07-15 ENCOUNTER — APPOINTMENT (OUTPATIENT)
Dept: RHEUMATOLOGY | Facility: CLINIC | Age: 54
End: 2024-07-15
Payer: COMMERCIAL

## 2024-07-15 ENCOUNTER — APPOINTMENT (OUTPATIENT)
Dept: CARDIOLOGY | Facility: CLINIC | Age: 54
End: 2024-07-15
Payer: COMMERCIAL

## 2024-07-15 DIAGNOSIS — I42.8 OTHER CARDIOMYOPATHY (MULTI): Primary | ICD-10-CM

## 2024-07-15 PROCEDURE — 93005 ELECTROCARDIOGRAM TRACING: CPT | Performed by: INTERNAL MEDICINE

## 2024-07-16 ENCOUNTER — ANTICOAGULATION - WARFARIN VISIT (OUTPATIENT)
Dept: CARDIOLOGY | Facility: CLINIC | Age: 54
End: 2024-07-16
Payer: COMMERCIAL

## 2024-07-16 DIAGNOSIS — I26.99 RECURRENT PULMONARY EMBOLISM (MULTI): ICD-10-CM

## 2024-07-16 DIAGNOSIS — I82.4Z9 DEEP VEIN THROMBOSIS (DVT) OF DISTAL VEIN OF LOWER EXTREMITY, UNSPECIFIED CHRONICITY, UNSPECIFIED LATERALITY (MULTI): ICD-10-CM

## 2024-07-16 DIAGNOSIS — I82.210 THROMBOSIS OF SUPERIOR VENA CAVA (MULTI): ICD-10-CM

## 2024-07-16 DIAGNOSIS — I82.4Y9 DEEP VEIN THROMBOSIS (DVT) OF PROXIMAL LOWER EXTREMITY, UNSPECIFIED CHRONICITY, UNSPECIFIED LATERALITY (MULTI): Primary | ICD-10-CM

## 2024-07-16 LAB
INR IN PPP BY COAGULATION ASSAY EXTERNAL: 2.6
PROTHROMBIN TIME (PT) IN PPP BY COAGULATION ASSAY EXTERNAL: NORMAL

## 2024-07-16 NOTE — PROGRESS NOTES
Patient identification verified with 2 identifiers.    Location: [facility] Patient Self-Testing Program [phone]    Referring Physician: Dr. Erich Whaley   Enrollment/ Re-enrollment date: 2025   INR Goal: 2.0-3.0  INR monitoring is per [facility] protocol.  Anticoagulation Medication: warfarin  Indication: Deep Vein Thrombosis (DVT) and Pulmonary Embolism (PE)    Subjective   Bleeding signs/symptoms: No    Bruising: No   Major bleeding event: No  Thrombosis signs/symptoms: No  Thromboembolic event: No  Missed doses: No  Extra doses: No  Medication changes: No  Dietary changes: No  Change in health: No  Change in activity: No  Alcohol: No  Other concerns: No    Upcoming Procedures:  Does the Patient Have any upcoming procedures that require interruption in anticoagulation therapy? no  Does the patient require bridging? no      Anticoagulation Summary  As of 2024      INR goal:  2.0-3.0   TTR:  85.9% (6.9 mo)   INR used for dosin.60 (2024)   Weekly warfarin total:  37.5 mg               Assessment/Plan   Therapeutic     1. New dose: no change    2. Next INR: 2 weeks      Education provided to patient during the visit:  Patient instructed to call in interim with questions, concerns and changes.   Patient educated on interactions between medications and warfarin.   Patient educated on dietary consistency in vitamin k consumption.   Patient educated on signs of bleeding/clotting.   Patient educated on compliance with dosing, follow up appointments, and prescribed plan of care.

## 2024-07-17 ENCOUNTER — LAB (OUTPATIENT)
Dept: LAB | Facility: HOSPITAL | Age: 54
End: 2024-07-17
Payer: COMMERCIAL

## 2024-07-17 ENCOUNTER — OFFICE VISIT (OUTPATIENT)
Dept: HEMATOLOGY/ONCOLOGY | Facility: HOSPITAL | Age: 54
End: 2024-07-17
Payer: COMMERCIAL

## 2024-07-17 VITALS
RESPIRATION RATE: 15 BRPM | SYSTOLIC BLOOD PRESSURE: 135 MMHG | BODY MASS INDEX: 38.79 KG/M2 | OXYGEN SATURATION: 95 % | DIASTOLIC BLOOD PRESSURE: 56 MMHG | TEMPERATURE: 96.1 F | HEART RATE: 79 BPM | WEIGHT: 226 LBS

## 2024-07-17 DIAGNOSIS — I82.210 THROMBOSIS OF SUPERIOR VENA CAVA (MULTI): ICD-10-CM

## 2024-07-17 DIAGNOSIS — K59.00 CONSTIPATION, UNSPECIFIED CONSTIPATION TYPE: ICD-10-CM

## 2024-07-17 DIAGNOSIS — D57.00 SICKLE CELL CRISIS (MULTI): Primary | ICD-10-CM

## 2024-07-17 PROCEDURE — 99214 OFFICE O/P EST MOD 30 MIN: CPT | Performed by: PEDIATRICS

## 2024-07-17 RX ORDER — CYCLOBENZAPRINE HCL 10 MG
10 TABLET ORAL 3 TIMES DAILY PRN
Qty: 30 TABLET | Refills: 3 | Status: SHIPPED | OUTPATIENT
Start: 2024-07-17

## 2024-07-17 RX ORDER — WARFARIN SODIUM 5 MG/1
5 TABLET ORAL EVERY EVENING
Qty: 30 TABLET | Refills: 3 | Status: SHIPPED | OUTPATIENT
Start: 2024-07-17 | End: 2024-08-16

## 2024-07-17 ASSESSMENT — PAIN SCALES - GENERAL: PAINLEVEL: 0-NO PAIN

## 2024-07-19 ASSESSMENT — ENCOUNTER SYMPTOMS
ADENOPATHY: 0
SCLERAL ICTERUS: 0
HEMATURIA: 0
FEVER: 0
PALPITATIONS: 0
NAUSEA: 0
EYE PROBLEMS: 0
MYALGIAS: 0
DEPRESSION: 0
CONSTIPATION: 0
VOMITING: 0
NERVOUS/ANXIOUS: 0
ARTHRALGIAS: 1
HEMOPTYSIS: 0
BLOOD IN STOOL: 0
CHEST TIGHTNESS: 0
LEG SWELLING: 0
ABDOMINAL PAIN: 0
DIAPHORESIS: 0
BRUISES/BLEEDS EASILY: 0
WOUND: 0
LIGHT-HEADEDNESS: 0
SORE THROAT: 0
FREQUENCY: 0
DYSURIA: 0
FATIGUE: 1
DIARRHEA: 0
CHILLS: 0
EXTREMITY WEAKNESS: 0
BACK PAIN: 0
WHEEZING: 0
COUGH: 0
FLANK PAIN: 0
HEADACHES: 0
NUMBNESS: 0

## 2024-07-19 NOTE — PROGRESS NOTES
SICKLE CELL OUTPATIENT NOTE  Patient ID: Senait Narvaez   Visit Type: Follow up visit     ASSESSMENT AND PLAN  Senait Narvaez is 54 y.o. 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 She rates pain at 5-6/1- which is her typical chronic pain. This is well controlled with current home oral regimen. Senait was previously on red cell exchange transfusions but this has been put on hold in the interim   History of recurrent VTE/PE with SVC syndrome, on extended duration anticoagulation with warfarin. She continues to tolerate this without any major complications   History of nocturnal hypoxia on night time oxygen. She has planned air travel to [place] this fall   Chronic constipation, well controlled     PLAN   - No changes in her current home oral regimen and this is with   Oral tylenol 650 mg every 6 hours prn for mild to moderate pain   Oral oxycodone 10-20 mg every 4-6 hours as needed for moderate to severe and breakthrough pain    Non pharmacologic methods of pain control   Reviewed OARRS  - Continue 2L night time oxygen   - We will request for a HAST/flight simulation test   - Continue to hold off red cell exchange transfusion for now since she has done quite well off it recently. We will continue to review need to resume this during each encounter with us   - Daily folic acid 1 mg   - Continue warfarin 5 mg daily with target INR of 2-3    - Colace and miralax prn for chronic constipation   - Follow up will be in 1-2 months time.     Chief Complaint: Follow up for SCD on chronic red cell exchange transfusion      Interval History: Senait is present with for follow up of SCD. She has overall been doing well since her last office visit encounter and has not had any admissions or ED visits. Chronic pain is about the same and its generalized but worse in her lower back and arms whish she rates at 5-6/10. This is overall well controlled with her current home meds. Red cell exchange transfusion is on hold and  indicates she feels better that when she was on the red cell exchange transfusion. Facial and neck swelling are unchanged from prior. She remains on warfarin as anticoagulation for recurrent VTE, which she is tolerating without increased bruising or bleeding. Her INR has been therapeutic between 2-3. She denies palpitations, chest pain or chest tightness, headaches, dizziness, nausea or vomiting. She is afebrile. She is stooling well with current laxatives. She has been compliant with supplemental oxygen 2L at night. She is planning on a  trip to [place] this fall. She denies focal neurologic deficits or leg ulcers     Review of System:   Review of Systems   Constitutional:  Positive for fatigue. Negative for chills, diaphoresis and fever.   HENT:   Negative for mouth sores, nosebleeds and sore throat.    Eyes:  Negative for eye problems and icterus.   Respiratory:  Negative for chest tightness, cough, hemoptysis and wheezing.    Cardiovascular:  Negative for chest pain, leg swelling and palpitations.   Gastrointestinal:  Negative for abdominal pain, blood in stool, constipation, diarrhea, nausea and vomiting.   Genitourinary:  Negative for dysuria, frequency and hematuria.    Musculoskeletal:  Positive for arthralgias. Negative for back pain, flank pain, gait problem and myalgias.   Skin:  Negative for itching, rash and wound.   Neurological:  Negative for extremity weakness, gait problem, headaches, light-headedness and numbness.   Hematological:  Negative for adenopathy. Does not bruise/bleed easily.   Psychiatric/Behavioral:  Negative for depression. The patient is not nervous/anxious.       Allergies:   No Known Allergies    Current Medications:   Medication Documentation Review Audit       Reviewed by Erich Whaley MD (Physician) on 06/19/24 at 1309      Medication Order Taking? Sig Documenting Provider Last Dose Status   albuterol 90 mcg/actuation inhaler [id number] Yes Inhale 2 puffs every 6 hours if  needed for wheezing. Shayla Kaur PA-C Taking Active   ascorbic acid (Vitamin C) 500 mg chewable tablet [id number] Yes Chew 1 tablet (500 mg) once daily. As needed Historical Provider, MD Taking Active   elderberry fruit 350 mg capsule [id number] Yes Take 1 capsule by mouth once daily. Historical Provider, MD Taking Active   ergocalciferol (Vitamin D-2) 1.25 MG (79901 UT) capsule [id number] Yes Take 1 capsule (50,000 Units) by mouth 1 (one) time per week for 8 doses. GRISEL Moseley-CNP Taking Active   fluticasone (Flonase) 50 mcg/actuation nasal spray [id number] Yes Administer 2 sprays into each nostril if needed. Historical Provider, MD Taking Active   folic acid (Folvite) 1 mg tablet [id number] Yes Take 1 tablet (1 mg) by mouth once daily. Historical Provider, MD Taking Active   furosemide (Lasix) 20 mg tablet [id number] Yes Take 1 tablet (20 mg) by mouth every other day. Historical Provider, MD Taking Active   loratadine (Claritin) 10 mg tablet [id number] Yes Take 1 tablet (10 mg) by mouth once daily as needed for allergies. Historical Provider, MD Taking Active   metoprolol tartrate (Lopressor) 25 mg tablet [id number] Yes Take 0.5 tablets (12.5 mg) by mouth 2 times a day. Shayla Kaur PA-C Taking Active   multivitamin with minerals tablet [id number] Yes Take 1 tablet by mouth once daily. Historical Provider, MD Taking Active   naloxone (Narcan) 4 mg/0.1 mL nasal spray [id number] Yes Administer 1 spray (4 mg) into affected nostril(s) if needed for opioid reversal. May repeat every 2-3 minutes if needed, alternating nostrils, until medical assistance becomes available. GRISEL Mendez-CNP Taking Active   ondansetron (Zofran) 4 mg tablet [id number] Yes Take 1 tablet (4 mg) by mouth every 8 hours if needed for nausea or vomiting. GRISEL Albert-CNP Taking Active   oxyCODONE (Roxicodone) 10 mg immediate release tablet [id number] Yes Take 1 tablet (10 mg) by mouth every 4 hours if  needed for severe pain (7 - 10) (pain). GRISEL Albert-CNP Taking Active   oxygen (O2) gas therapy [id number] Yes Inhale 1 each continuously. GRISEL Contreras-CNP Taking Active   sennosides-docusate sodium (Nita-Colace) 8.6-50 mg tablet [id number] Yes Take 2 tablets by mouth every 12 hours. Historical Provider, MD Taking Active   warfarin (Coumadin) 5 mg tablet [id number]  Take 1 tablet (5 mg) by mouth once daily in the evening. GRISEL Albert-CNP  Active                   Past Medical History:   She has a past medical history of Asthma ([facility]), CHF (congestive heart failure) ([facility]), Chronic pain disorder, Compression of vein (02/19/2020), and Hypotension, unspecified (12/10/2020).    Past Surgical History:   She has a past surgical history that includes Appendectomy (08/05/2013); Cholecystectomy (08/05/2013); Other surgical history (05/13/2014); Other surgical history (10/09/2014); Other surgical history (06/09/2014); MR angio head wo IV contrast (8/16/2014); MR angio neck wo IV contrast (8/16/2014); IR CVC tunneled (3/13/2015); IR CVC tunneled (1/29/2016); IR CVC tunneled (1/17/2018); IR CVC tunneled (2/22/2018); IR CVC tunneled (3/22/2018); IR CVC tunneled (4/18/2018); IR CVC tunneled (5/16/2018); IR CVC tunneled (6/12/2018); US guided biopsy lymph node superficial (9/17/2019); CT guided percutaneous biopsy LYMPH node superficial (9/19/2019); IR CVC tunneled (1/21/2020); IR CVC tunneled (2/28/2020); IR CVC tunneled (4/10/2020); IR CVC tunneled (5/22/2020); IR CVC tunneled (6/19/2020); IR CVC tunneled (7/23/2020); IR CVC tunneled (9/4/2020); IR CVC tunneled (10/9/2020); IR CVC tunneled (11/13/2020); IR CVC tunneled (12/18/2020); IR CVC tunneled (1/22/2021); IR CVC tunneled (2/26/2021); IR CVC tunneled (4/2/2021); IR CVC tunneled (5/7/2021); IR CVC tunneled (6/11/2021); IR CVC tunneled (7/16/2021); IR CVC tunneled (8/20/2021); IR CVC tunneled (9/24/2021); IR CVC tunneled (10/29/2021); IR  CVC tunneled (12/3/2021); IR CVC tunneled (1/7/2022); IR CVC tunneled (2/23/2022); IR CVC tunneled (3/25/2022); IR CVC tunneled (4/22/2022); IR CVC tunneled (6/3/2022); IR CVC tunneled (9/18/2017); IR CVC tunneled (10/30/2017); IR CVC tunneled (12/19/2017); IR CVC tunneled (1/6/2023); IR CVC tunneled (2/24/2023); IR CVC tunneled (7/8/2022); IR CVC tunneled (8/12/2022); IR CVC tunneled (9/16/2022); CT angio neck (10/19/2022); IR CVC tunneled (10/20/2022); IR CVC tunneled (11/29/2022); IR CVC tunneled (3/31/2023); IR CVC tunneled (6/9/2023); IR CVC tunneled (7/20/2023); IR CVC tunneled (8/16/2023); IR CVC tunneled (9/21/2023); IR CVC nontunneled (10/26/2023); and IR CVC nontunneled (12/7/2023).    Family History:   Family History   Problem Relation    Diabetes Father    Gout Father    Sickle cell trait Father    Seizures Sister    Diabetes Other    Uterine cancer Other    Other (congenital blindness) Cousin     Social History:   Senait Narvaez  reports that she quit smoking about 10 years ago. Her smoking use included cigarettes. She has been exposed to tobacco smoke. She has never used smokeless tobacco.  reports that she does not currently use drugs.    EXAMINATION FINDINGS   /56 (BP Location: Left arm, Patient Position: Sitting, BP Cuff Size: Adult)   Pulse 79   Temp 35.6 °C (96.1 °F) (Temporal)   Resp 15   Wt 103 kg (226 lb)   SpO2 95%   BMI 38.79 kg/m²   2.16 meters squared    Physical Exam  Vitals and nursing note reviewed.   Constitutional:       General: She is not in acute distress.     Appearance: She is not toxic-appearing.   Neck:      Vascular: No carotid bruit.      Comments: SVC syndrome with distension of her neck veins  Cardiovascular:      Rate and Rhythm: Normal rate and regular rhythm.      Pulses: Normal pulses.      Heart sounds: No murmur heard.     No gallop.   Pulmonary:      Effort: Pulmonary effort is normal. No respiratory distress.      Breath sounds: Normal breath sounds. No  wheezing.   Abdominal:      General: Bowel sounds are normal.      Palpations: Abdomen is soft.      Tenderness: There is no abdominal tenderness. There is no guarding.   Musculoskeletal:         General: No swelling or deformity.      Cervical back: No rigidity or tenderness.      Right lower leg: No edema.      Left lower leg: No edema.   Lymphadenopathy:      Cervical: No cervical adenopathy.   Skin:     General: Skin is warm.      Capillary Refill: Capillary refill takes less than 2 seconds.      Findings: No lesion or rash.   Neurological:      General: No focal deficit present.      Mental Status: She is alert and oriented to person, place, and time. Mental status is at baseline.      Cranial Nerves: No cranial nerve deficit.      Motor: No weakness.      Gait: Gait normal.   Psychiatric:         Mood and Affect: Mood normal.         Behavior: Behavior normal.       LABS   Anticoagulation - Warfarin Visit on 07/16/2024   Component Date Value Ref Range Status    INR External 07/16/2024 2.60   Final              Erich Whaley MD

## 2024-07-23 ENCOUNTER — ANTICOAGULATION - WARFARIN VISIT (OUTPATIENT)
Dept: CARDIOLOGY | Facility: HOSPITAL | Age: 54
End: 2024-07-23
Payer: COMMERCIAL

## 2024-07-23 ENCOUNTER — TELEPHONE (OUTPATIENT)
Dept: ADMISSION | Facility: HOSPITAL | Age: 54
End: 2024-07-23
Payer: COMMERCIAL

## 2024-07-23 DIAGNOSIS — I82.4Y9 DEEP VEIN THROMBOSIS (DVT) OF PROXIMAL LOWER EXTREMITY, UNSPECIFIED CHRONICITY, UNSPECIFIED LATERALITY (MULTI): Primary | ICD-10-CM

## 2024-07-23 DIAGNOSIS — D57.00 SICKLE CELL CRISIS (MULTI): ICD-10-CM

## 2024-07-23 DIAGNOSIS — I82.210 THROMBOSIS OF SUPERIOR VENA CAVA (MULTI): ICD-10-CM

## 2024-07-23 DIAGNOSIS — I82.4Z9 DEEP VEIN THROMBOSIS (DVT) OF DISTAL VEIN OF LOWER EXTREMITY, UNSPECIFIED CHRONICITY, UNSPECIFIED LATERALITY (MULTI): ICD-10-CM

## 2024-07-23 DIAGNOSIS — I26.99 RECURRENT PULMONARY EMBOLISM (MULTI): ICD-10-CM

## 2024-07-23 RX ORDER — OXYCODONE HYDROCHLORIDE 10 MG/1
10 TABLET ORAL EVERY 4 HOURS PRN
Qty: 75 TABLET | Refills: 0 | Status: SHIPPED | OUTPATIENT
Start: 2024-07-23

## 2024-07-23 NOTE — TELEPHONE ENCOUNTER
Senait Narvaez called the refill line for Oxycodone 10mg. Requesting refills be sent to [facility] pharmacy; message sent to Sickle Cell team to submit.

## 2024-07-30 ENCOUNTER — ANTICOAGULATION - WARFARIN VISIT (OUTPATIENT)
Dept: CARDIOLOGY | Facility: CLINIC | Age: 54
End: 2024-07-30
Payer: COMMERCIAL

## 2024-07-30 DIAGNOSIS — I82.210 THROMBOSIS OF SUPERIOR VENA CAVA (MULTI): ICD-10-CM

## 2024-07-30 DIAGNOSIS — I82.4Z9 DEEP VEIN THROMBOSIS (DVT) OF DISTAL VEIN OF LOWER EXTREMITY, UNSPECIFIED CHRONICITY, UNSPECIFIED LATERALITY (MULTI): ICD-10-CM

## 2024-07-30 DIAGNOSIS — I26.99 RECURRENT PULMONARY EMBOLISM (MULTI): ICD-10-CM

## 2024-07-30 DIAGNOSIS — I82.4Y9 DEEP VEIN THROMBOSIS (DVT) OF PROXIMAL LOWER EXTREMITY, UNSPECIFIED CHRONICITY, UNSPECIFIED LATERALITY (MULTI): Primary | ICD-10-CM

## 2024-07-30 LAB
INR IN PPP BY COAGULATION ASSAY EXTERNAL: 2.9
PROTHROMBIN TIME (PT) IN PPP BY COAGULATION ASSAY EXTERNAL: NORMAL

## 2024-07-30 NOTE — PROGRESS NOTES
Patient identification verified with 2 identifiers.    Location: [facility] Patient Self-Testing Program [phone]    Referring Physician: Dr. Erich Whaley   Enrollment/ Re-enrollment date: 2025   INR Goal: 2.0-3.0  INR monitoring is per [facility] protocol.  Anticoagulation Medication: warfarin  Indication: Deep Vein Thrombosis (DVT) and Pulmonary Embolism (PE)    Subjective   Bleeding signs/symptoms: No    Bruising: No   Major bleeding event: No  Thrombosis signs/symptoms: No  Thromboembolic event: No  Missed doses: No  Extra doses: No  Medication changes: Yes  ON FLEXERAL  Dietary changes: No  Change in health: No  Change in activity: No  Alcohol: No  Other concerns: No    Upcoming Procedures:  Does the Patient Have any upcoming procedures that require interruption in anticoagulation therapy? no  Does the patient require bridging? no      Anticoagulation Summary  As of 2024      INR goal:  2.0-3.0   TTR:  86.8% (7.4 mo)   INR used for dosin.90 (2024)   Weekly warfarin total:  37.5 mg               Assessment/Plan   Therapeutic     1. New dose: no change    2. Next INR: 2 weeks      Education provided to patient during the visit:  Patient instructed to call in interim with questions, concerns and changes.   Patient educated on interactions between medications and warfarin.   Patient educated on dietary consistency in vitamin k consumption.   Patient educated on signs of bleeding/clotting.   Patient educated on compliance with dosing, follow up appointments, and prescribed plan of care.

## 2024-08-08 ENCOUNTER — TELEPHONE (OUTPATIENT)
Dept: ADMISSION | Facility: HOSPITAL | Age: 54
End: 2024-08-08
Payer: COMMERCIAL

## 2024-08-08 DIAGNOSIS — D57.00 SICKLE CELL CRISIS (MULTI): ICD-10-CM

## 2024-08-08 RX ORDER — OXYCODONE HYDROCHLORIDE 10 MG/1
10 TABLET ORAL EVERY 4 HOURS PRN
Qty: 75 TABLET | Refills: 0 | Status: SHIPPED | OUTPATIENT
Start: 2024-08-08 | End: 2024-08-08 | Stop reason: SDUPTHER

## 2024-08-08 RX ORDER — OXYCODONE HYDROCHLORIDE 10 MG/1
10 TABLET ORAL EVERY 4 HOURS PRN
Qty: 75 TABLET | Refills: 0 | Status: SHIPPED | OUTPATIENT
Start: 2024-08-08

## 2024-08-08 NOTE — TELEPHONE ENCOUNTER
Pt requesting refill   Oxycodone 10mg. 1 tablet every 4 hrs prn  Pharmacy: [facility] [address] [place]  Last FUV 7/17 with next FUV 8/12

## 2024-08-12 ENCOUNTER — LAB (OUTPATIENT)
Dept: LAB | Facility: HOSPITAL | Age: 54
End: 2024-08-12
Payer: COMMERCIAL

## 2024-08-12 ENCOUNTER — OFFICE VISIT (OUTPATIENT)
Dept: HEMATOLOGY/ONCOLOGY | Facility: HOSPITAL | Age: 54
End: 2024-08-12
Payer: COMMERCIAL

## 2024-08-12 VITALS
DIASTOLIC BLOOD PRESSURE: 64 MMHG | RESPIRATION RATE: 16 BRPM | TEMPERATURE: 96.6 F | OXYGEN SATURATION: 90 % | WEIGHT: 233.5 LBS | HEART RATE: 72 BPM | BODY MASS INDEX: 40.08 KG/M2 | SYSTOLIC BLOOD PRESSURE: 126 MMHG

## 2024-08-12 DIAGNOSIS — I82.90 VTE (VENOUS THROMBOEMBOLISM): ICD-10-CM

## 2024-08-12 DIAGNOSIS — Z92.89 HISTORY OF EXCHANGE TRANSFUSION: ICD-10-CM

## 2024-08-12 DIAGNOSIS — G89.4 CHRONIC PAIN SYNDROME: Primary | ICD-10-CM

## 2024-08-12 DIAGNOSIS — D57.219 SICKLE-CELL-HEMOGLOBIN C DISEASE WITH CRISIS (MULTI): ICD-10-CM

## 2024-08-12 DIAGNOSIS — I15.9 SECONDARY HYPERTENSION: ICD-10-CM

## 2024-08-12 DIAGNOSIS — D57.00 SICKLE CELL CRISIS (MULTI): ICD-10-CM

## 2024-08-12 LAB
ALBUMIN SERPL BCP-MCNC: 3.8 G/DL (ref 3.4–5)
ALP SERPL-CCNC: 128 U/L (ref 33–110)
ALT SERPL W P-5'-P-CCNC: 16 U/L (ref 7–45)
ANION GAP SERPL CALC-SCNC: 10 MMOL/L (ref 10–20)
AST SERPL W P-5'-P-CCNC: 21 U/L (ref 9–39)
BASOPHILS # BLD AUTO: 0.02 X10*3/UL (ref 0–0.1)
BASOPHILS NFR BLD AUTO: 0.2 %
BILIRUB SERPL-MCNC: 3 MG/DL (ref 0–1.2)
BUN SERPL-MCNC: 12 MG/DL (ref 6–23)
BURR CELLS BLD QL SMEAR: NORMAL
CALCIUM SERPL-MCNC: 8.9 MG/DL (ref 8.6–10.3)
CHLORIDE SERPL-SCNC: 104 MMOL/L (ref 98–107)
CO2 SERPL-SCNC: 30 MMOL/L (ref 21–32)
CREAT SERPL-MCNC: 0.94 MG/DL (ref 0.5–1.05)
DACRYOCYTES BLD QL SMEAR: NORMAL
EGFRCR SERPLBLD CKD-EPI 2021: 72 ML/MIN/1.73M*2
EOSINOPHIL # BLD AUTO: 0.48 X10*3/UL (ref 0–0.7)
EOSINOPHIL NFR BLD AUTO: 5.7 %
ERYTHROCYTE [DISTWIDTH] IN BLOOD BY AUTOMATED COUNT: 31.7 % (ref 11.5–14.5)
GIANT PLATELETS BLD QL SMEAR: NORMAL
GLUCOSE SERPL-MCNC: 89 MG/DL (ref 74–99)
HCT VFR BLD AUTO: 30.3 % (ref 36–46)
HGB BLD-MCNC: 10.9 G/DL (ref 12–16)
HGB RETIC QN: 33 PG (ref 28–38)
HYPOCHROMIA BLD QL SMEAR: NORMAL
IMM GRANULOCYTES # BLD AUTO: 0.03 X10*3/UL (ref 0–0.7)
IMM GRANULOCYTES NFR BLD AUTO: 0.4 % (ref 0–0.9)
IMMATURE RETIC FRACTION: 49.2 %
LDH SERPL L TO P-CCNC: 289 U/L (ref 84–246)
LYMPHOCYTES # BLD AUTO: 2.51 X10*3/UL (ref 1.2–4.8)
LYMPHOCYTES NFR BLD AUTO: 30 %
MCH RBC QN AUTO: 26.8 PG (ref 26–34)
MCHC RBC AUTO-ENTMCNC: 36 G/DL (ref 32–36)
MCV RBC AUTO: 74 FL (ref 80–100)
MONOCYTES # BLD AUTO: 0.67 X10*3/UL (ref 0.1–1)
MONOCYTES NFR BLD AUTO: 8 %
NEUTROPHILS # BLD AUTO: 4.65 X10*3/UL (ref 1.2–7.7)
NEUTROPHILS NFR BLD AUTO: 55.7 %
NRBC BLD-RTO: 18.5 /100 WBCS (ref 0–0)
OVALOCYTES BLD QL SMEAR: NORMAL
PAPPENHEIMER BOD BLD QL SMEAR: PRESENT
PLATELET # BLD AUTO: 219 X10*3/UL (ref 150–450)
POLYCHROMASIA BLD QL SMEAR: NORMAL
POTASSIUM SERPL-SCNC: 4.2 MMOL/L (ref 3.5–5.3)
PROT SERPL-MCNC: 7 G/DL (ref 6.4–8.2)
RBC # BLD AUTO: 4.07 X10*6/UL (ref 4–5.2)
RBC MORPH BLD: NORMAL
RETICS #: 0.26 X10*6/UL (ref 0.02–0.08)
RETICS/RBC NFR AUTO: 6.3 % (ref 0.5–2)
SCHISTOCYTES BLD QL SMEAR: NORMAL
SICKLE CELLS BLD QL SMEAR: NORMAL
SODIUM SERPL-SCNC: 140 MMOL/L (ref 136–145)
TARGETS BLD QL SMEAR: NORMAL
WBC # BLD AUTO: 8.4 X10*3/UL (ref 4.4–11.3)

## 2024-08-12 PROCEDURE — 3078F DIAST BP <80 MM HG: CPT | Performed by: PEDIATRICS

## 2024-08-12 PROCEDURE — 3074F SYST BP LT 130 MM HG: CPT | Performed by: PEDIATRICS

## 2024-08-12 PROCEDURE — 80053 COMPREHEN METABOLIC PANEL: CPT

## 2024-08-12 PROCEDURE — 99215 OFFICE O/P EST HI 40 MIN: CPT | Performed by: PEDIATRICS

## 2024-08-12 PROCEDURE — 85045 AUTOMATED RETICULOCYTE COUNT: CPT

## 2024-08-12 PROCEDURE — 83615 LACTATE (LD) (LDH) ENZYME: CPT

## 2024-08-12 PROCEDURE — 85025 COMPLETE CBC W/AUTO DIFF WBC: CPT

## 2024-08-12 PROCEDURE — 36415 COLL VENOUS BLD VENIPUNCTURE: CPT

## 2024-08-12 RX ORDER — FOLIC ACID 1 MG/1
1 TABLET ORAL DAILY
Qty: 90 TABLET | Refills: 3 | Status: ON HOLD | OUTPATIENT
Start: 2024-08-12

## 2024-08-12 ASSESSMENT — PAIN SCALES - GENERAL: PAINLEVEL: 7

## 2024-08-13 ENCOUNTER — OFFICE VISIT (OUTPATIENT)
Dept: CARDIOLOGY | Facility: CLINIC | Age: 54
End: 2024-08-13
Payer: COMMERCIAL

## 2024-08-13 ENCOUNTER — ANTICOAGULATION - WARFARIN VISIT (OUTPATIENT)
Dept: CARDIOLOGY | Facility: CLINIC | Age: 54
End: 2024-08-13
Payer: COMMERCIAL

## 2024-08-13 VITALS
HEART RATE: 68 BPM | OXYGEN SATURATION: 96 % | HEIGHT: 65 IN | BODY MASS INDEX: 38.19 KG/M2 | SYSTOLIC BLOOD PRESSURE: 130 MMHG | DIASTOLIC BLOOD PRESSURE: 84 MMHG | WEIGHT: 229.2 LBS

## 2024-08-13 DIAGNOSIS — I82.4Z9 DEEP VEIN THROMBOSIS (DVT) OF DISTAL VEIN OF LOWER EXTREMITY, UNSPECIFIED CHRONICITY, UNSPECIFIED LATERALITY (MULTI): ICD-10-CM

## 2024-08-13 DIAGNOSIS — I26.99 RECURRENT PULMONARY EMBOLISM (MULTI): ICD-10-CM

## 2024-08-13 DIAGNOSIS — I82.4Y9 DEEP VEIN THROMBOSIS (DVT) OF PROXIMAL LOWER EXTREMITY, UNSPECIFIED CHRONICITY, UNSPECIFIED LATERALITY (MULTI): Primary | ICD-10-CM

## 2024-08-13 DIAGNOSIS — Z86.79 H/O SUPRAVENTRICULAR TACHYCARDIA: ICD-10-CM

## 2024-08-13 DIAGNOSIS — I11.9 HYPERTENSIVE HEART DISEASE WITHOUT HEART FAILURE: ICD-10-CM

## 2024-08-13 DIAGNOSIS — I27.20 PULMONARY HYPERTENSION (MULTI): ICD-10-CM

## 2024-08-13 DIAGNOSIS — R79.89 ELEVATED TROPONIN I LEVEL: ICD-10-CM

## 2024-08-13 DIAGNOSIS — I42.9 CARDIOMYOPATHY, UNSPECIFIED TYPE (MULTI): Primary | ICD-10-CM

## 2024-08-13 DIAGNOSIS — I21.4 ACUTE NON-ST ELEVATION MYOCARDIAL INFARCTION (NSTEMI) (MULTI): ICD-10-CM

## 2024-08-13 DIAGNOSIS — I82.210 THROMBOSIS OF SUPERIOR VENA CAVA (MULTI): ICD-10-CM

## 2024-08-13 PROCEDURE — 99213 OFFICE O/P EST LOW 20 MIN: CPT | Performed by: INTERNAL MEDICINE

## 2024-08-13 PROCEDURE — 93005 ELECTROCARDIOGRAM TRACING: CPT | Performed by: INTERNAL MEDICINE

## 2024-08-13 PROCEDURE — 93010 ELECTROCARDIOGRAM REPORT: CPT | Performed by: INTERNAL MEDICINE

## 2024-08-13 PROCEDURE — G2211 COMPLEX E/M VISIT ADD ON: HCPCS | Performed by: INTERNAL MEDICINE

## 2024-08-13 PROCEDURE — 3008F BODY MASS INDEX DOCD: CPT | Performed by: INTERNAL MEDICINE

## 2024-08-13 RX ORDER — FUROSEMIDE 20 MG/1
20 TABLET ORAL EVERY OTHER DAY
Qty: 45 TABLET | Refills: 3 | Status: SHIPPED | OUTPATIENT
Start: 2024-08-13 | End: 2025-08-13

## 2024-08-13 RX ORDER — METOPROLOL TARTRATE 25 MG/1
12.5 TABLET, FILM COATED ORAL 2 TIMES DAILY
Qty: 90 TABLET | Refills: 3 | Status: SHIPPED | OUTPATIENT
Start: 2024-08-13 | End: 2025-08-13

## 2024-08-13 ASSESSMENT — ENCOUNTER SYMPTOMS
LOSS OF SENSATION IN FEET: 0
OCCASIONAL FEELINGS OF UNSTEADINESS: 0
DEPRESSION: 0
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
## 2024-08-13 NOTE — PROGRESS NOTES
Called patient to reminder her to test INR today. She is unable to do so today and states she will test on Thursday 8/15/24.

## 2024-08-15 ENCOUNTER — ANTICOAGULATION - WARFARIN VISIT (OUTPATIENT)
Dept: CARDIOLOGY | Facility: CLINIC | Age: 54
End: 2024-08-15
Payer: COMMERCIAL

## 2024-08-15 DIAGNOSIS — I82.210 THROMBOSIS OF SUPERIOR VENA CAVA (MULTI): ICD-10-CM

## 2024-08-15 DIAGNOSIS — I82.4Z9 DEEP VEIN THROMBOSIS (DVT) OF DISTAL VEIN OF LOWER EXTREMITY, UNSPECIFIED CHRONICITY, UNSPECIFIED LATERALITY (MULTI): ICD-10-CM

## 2024-08-15 DIAGNOSIS — I82.4Y9 DEEP VEIN THROMBOSIS (DVT) OF PROXIMAL LOWER EXTREMITY, UNSPECIFIED CHRONICITY, UNSPECIFIED LATERALITY (MULTI): Primary | ICD-10-CM

## 2024-08-15 DIAGNOSIS — I26.99 RECURRENT PULMONARY EMBOLISM (MULTI): ICD-10-CM

## 2024-08-15 NOTE — PROGRESS NOTES
Patient identification verified with 2 identifiers.    Location: [facility] Patient Self-Testing Program [phone]    Referring Physician: Dr. Erich Whaley   Enrollment/ Re-enrollment date: 2025   INR Goal: 2.0-3.0  INR monitoring is per [facility] protocol.  Anticoagulation Medication: warfarin  Indication: Deep Vein Thrombosis (DVT) and Pulmonary Embolism (PE)    Subjective   Bleeding signs/symptoms: No    Bruising: No   Major bleeding event: No  Thrombosis signs/symptoms: No  Thromboembolic event: No  Missed doses: No  Extra doses: No  Medication changes: No  Dietary changes: No  Change in health: No  Change in activity: No  Alcohol: No  Other concerns: No    Upcoming Procedures:  Does the Patient Have any upcoming procedures that require interruption in anticoagulation therapy? no  Does the patient require bridging? no      Anticoagulation Summary  As of 8/15/2024      INR goal:  2.0-3.0   TTR:  87.7% (7.9 mo)   INR used for dosin.60 (8/15/2024)   Weekly warfarin total:  37.5 mg               Assessment/Plan   Therapeutic     1. New dose: Spoke to patient with dosing and next testing date.    2. Next INR: 2 weeks      Education provided to patient during the visit:  Patient instructed to call in interim with questions, concerns and changes.

## 2024-08-16 LAB
ATRIAL RATE: 68 BPM
P AXIS: 71 DEGREES
P OFFSET: 177 MS
P ONSET: 126 MS
PR INTERVAL: 174 MS
Q ONSET: 213 MS
QRS COUNT: 11 BEATS
QRS DURATION: 78 MS
QT INTERVAL: 406 MS
QTC CALCULATION(BAZETT): 431 MS
QTC FREDERICIA: 423 MS
R AXIS: 79 DEGREES
T AXIS: 80 DEGREES
T OFFSET: 416 MS
VENTRICULAR RATE: 68 BPM

## 2024-08-20 ENCOUNTER — TELEPHONE (OUTPATIENT)
Dept: ADMISSION | Facility: HOSPITAL | Age: 54
End: 2024-08-20
Payer: COMMERCIAL

## 2024-08-20 DIAGNOSIS — D57.00 SICKLE CELL CRISIS (MULTI): ICD-10-CM

## 2024-08-20 DIAGNOSIS — F11.20 OPIOID DEPENDENCE, DAILY USE (MULTI): ICD-10-CM

## 2024-08-20 RX ORDER — OXYCODONE HYDROCHLORIDE 10 MG/1
10 TABLET ORAL EVERY 4 HOURS PRN
Qty: 75 TABLET | Refills: 0 | Status: SHIPPED | OUTPATIENT
Start: 2024-08-20 | End: 2024-08-20 | Stop reason: SDUPTHER

## 2024-08-20 NOTE — TELEPHONE ENCOUNTER
Senait Narvaez called the refill line for Oxycodone 10mg. Requesting refills be sent to [facility] pharmacy; message sent to Sickle Cell team to submit.

## 2024-08-29 ENCOUNTER — ANTICOAGULATION - WARFARIN VISIT (OUTPATIENT)
Dept: CARDIOLOGY | Facility: CLINIC | Age: 54
End: 2024-08-29
Payer: COMMERCIAL

## 2024-08-29 DIAGNOSIS — I82.4Y9 DEEP VEIN THROMBOSIS (DVT) OF PROXIMAL LOWER EXTREMITY, UNSPECIFIED CHRONICITY, UNSPECIFIED LATERALITY (MULTI): Primary | ICD-10-CM

## 2024-08-29 DIAGNOSIS — I26.99 RECURRENT PULMONARY EMBOLISM (MULTI): ICD-10-CM

## 2024-08-29 DIAGNOSIS — I82.4Z9 DEEP VEIN THROMBOSIS (DVT) OF DISTAL VEIN OF LOWER EXTREMITY, UNSPECIFIED CHRONICITY, UNSPECIFIED LATERALITY (MULTI): ICD-10-CM

## 2024-08-29 DIAGNOSIS — I82.210 THROMBOSIS OF SUPERIOR VENA CAVA (MULTI): ICD-10-CM

## 2024-08-29 NOTE — PROGRESS NOTES
Patient identification verified with 2 identifiers.    Location: [facility] Patient Self-Testing Program [phone]    Referring Physician: Dr. Erich Whaley   Enrollment/ Re-enrollment date: 2025   INR Goal: 2.0-3.0  INR monitoring is per [facility] protocol.  Anticoagulation Medication: warfarin  Indication: Deep Vein Thrombosis (DVT) and Pulmonary Embolism (PE)    Subjective   Bleeding signs/symptoms: No    Bruising: No   Major bleeding event: No  Thrombosis signs/symptoms: No  Thromboembolic event: No  Missed doses: No  Extra doses: No  Medication changes: No  Dietary changes: No  Change in health: No  Change in activity: No  Alcohol: No  Other concerns: No    Upcoming Procedures:  Does the Patient Have any upcoming procedures that require interruption in anticoagulation therapy? no  Does the patient require bridging? no      Anticoagulation Summary  As of 2024      INR goal:  2.0-3.0   TTR:  88.4% (8.4 mo)   INR used for dosin.90 (2024)   Weekly warfarin total:  37.5 mg               Assessment/Plan   Therapeutic     1. New dose: Spoke to patient with dosing and next testing date.    2. Next INR: 2 weeks      Education provided to patient during the visit:  Patient instructed to call in interim with questions, concerns and changes.

## 2024-09-03 ENCOUNTER — TELEPHONE (OUTPATIENT)
Dept: ADMISSION | Facility: HOSPITAL | Age: 54
End: 2024-09-03
Payer: COMMERCIAL

## 2024-09-03 DIAGNOSIS — D57.00 SICKLE CELL CRISIS (MULTI): ICD-10-CM

## 2024-09-03 RX ORDER — OXYCODONE HYDROCHLORIDE 10 MG/1
10 TABLET ORAL EVERY 4 HOURS PRN
Qty: 75 TABLET | Refills: 0 | Status: SHIPPED | OUTPATIENT
Start: 2024-09-03

## 2024-09-03 NOTE — TELEPHONE ENCOUNTER
Pt called requesting a refill on her Oxycodone 10mg q4 hours PRN to be sent to [facility] [address] on file. Message sent to the team.

## 2024-09-08 ASSESSMENT — ENCOUNTER SYMPTOMS
FREQUENCY: 0
PALPITATIONS: 0
HEMATURIA: 0
CONSTIPATION: 0
WHEEZING: 0
NERVOUS/ANXIOUS: 0
LIGHT-HEADEDNESS: 0
ARTHRALGIAS: 1
FLANK PAIN: 0
NAUSEA: 0
NUMBNESS: 0
WOUND: 0
CHILLS: 0
CHEST TIGHTNESS: 0
FATIGUE: 1
BRUISES/BLEEDS EASILY: 0
DYSURIA: 0
BACK PAIN: 1
BLOOD IN STOOL: 0
HEMOPTYSIS: 0
LEG SWELLING: 0
HEADACHES: 0
VOMITING: 0
DEPRESSION: 0
ADENOPATHY: 0
MYALGIAS: 0
EXTREMITY WEAKNESS: 0
DIAPHORESIS: 0
COUGH: 0
DIARRHEA: 0
SCLERAL ICTERUS: 0
FEVER: 0
EYE PROBLEMS: 0
ABDOMINAL PAIN: 0
SORE THROAT: 0

## 2024-09-08 NOTE — PROGRESS NOTES
SICKLE CELL OUTPATIENT NOTE  Patient ID: Senait Narvaez   Visit Type: Follow up visit     ASSESSMENT AND PLAN  Senait Narvaez is 54 y.o. 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 She is overall doing well off the exchange transfusion and will still like to hold for now    Recurrent VTE/PE with SVC syndrome, on extended duration anticoagulation with warfarin   Nocturnal hypoxia on night time oxygen. She has planned air travel to [place] this fall   Chronic constipation, well controlled   Hypertension and palpitations     PLAN   - No changes in her current home oral regimen and this is with   Oral tylenol 650 mg every 6 hours prn for mild to moderate pain   Flexeril as needed as a muscle relaxant  Oral oxycodone 10-20 mg every 4-6 hours as needed for moderate to severe and breakthrough pain    Non pharmacologic methods of pain control   Reviewed [facility]  - Continue 2L night time oxygen   - Continue to hold off red cell exchange transfusion for now and we will reassess at her next office visit    - Daily folic acid 1 mg   - Continue warfarin 5 mg daily with target INR of 2-3    - Colace and miralax prn for chronic constipation   - Continue metoprolol 12.5 mg BID  - Follow up will be in 2 months time.     Chief Complaint: Follow up for HB SC SCD, chronic pain and previously on red cell exchange transfusion      Interval History: Senait is present for follow up of SCD with chronic pain and she is without any complaints. She is overall doing well and stable. Chronic pain is unchanged from prior and rates pain at her baseline of 6/10. Pain is still generalized and worse in her lower back and extremities.  She is able to function at thi ramana level. She has not had any recent hospitalization or ED visits.  She will still like to hold off red cell exchange transfusions in the interim as she think that she is doing well  clinically. Facial and neck swelling are unchanged from prior. She remains on warfarin as anticoagulation for recurrent VTE, which she is tolerating without increased bruising or bleeding and INR's have been therapeutic between 2-3. She denies palpitations, chest pain or chest tightness, headaches, dizziness, nausea or vomiting. She is afebrile. She is stooling well with current laxatives. She has been compliant with supplemental oxygen 2L at night. She denies focal neurologic deficits or leg ulcers     Review of System:   Review of Systems   Constitutional:  Positive for fatigue. Negative for chills, diaphoresis and fever.   HENT:   Negative for mouth sores, nosebleeds and sore throat.    Eyes:  Negative for eye problems and icterus.   Respiratory:  Negative for chest tightness, cough, hemoptysis and wheezing.    Cardiovascular:  Negative for chest pain, leg swelling and palpitations.   Gastrointestinal:  Negative for abdominal pain, blood in stool, constipation, diarrhea, nausea and vomiting.   Genitourinary:  Negative for dysuria, frequency and hematuria.    Musculoskeletal:  Positive for arthralgias and back pain. Negative for flank pain, gait problem and myalgias.   Skin:  Negative for itching, rash and wound.   Neurological:  Negative for extremity weakness, gait problem, headaches, light-headedness and numbness.   Hematological:  Negative for adenopathy. Does not bruise/bleed easily.   Psychiatric/Behavioral:  Negative for depression. The patient is not nervous/anxious.       Allergies:   No Known Allergies    Current Medications:   Medication Documentation Review Audit       Reviewed by Mayra Maxwell RN (Registered Nurse) on 08/13/24 at 1149      Medication Order Taking? Sig Documenting Provider Last Dose Status   albuterol 90 mcg/actuation inhaler [id number] Yes Inhale 2 puffs every 6 hours if needed for wheezing. Shayla Kaur PA-C Taking Active   ascorbic acid (Vitamin C) 500 mg chewable tablet  [id number] Yes Chew 1 tablet (500 mg) once daily. As needed Historical Provider, MD Taking Active   cyclobenzaprine (Flexeril) 10 mg tablet [id number] Yes Take 1 tablet (10 mg) by mouth 3 times a day as needed for muscle spasms. Erich Whaley MD Taking Active   elderberry fruit 350 mg capsule [id number] Yes Take 1 capsule by mouth once daily. Historical Provider, MD Taking Active   fluticasone (Flonase) 50 mcg/actuation nasal spray [id number] Yes Administer 2 sprays into each nostril if needed. Historical Provider, MD Taking Active     Discontinued 08/12/24 0940   folic acid (Folvite) 1 mg tablet [id number] Yes Take 1 tablet (1 mg) by mouth once daily. Erich Whaley MD Taking Active   furosemide (Lasix) 20 mg tablet [id number] Yes Take 1 tablet (20 mg) by mouth every other day. Historical Provider, MD Taking Active   loratadine (Claritin) 10 mg tablet [id number] Yes Take 1 tablet (10 mg) by mouth once daily as needed for allergies. Historical Provider, MD Taking Active   metoprolol tartrate (Lopressor) 25 mg tablet [id number] Yes Take 0.5 tablets (12.5 mg) by mouth 2 times a day. Shayla Kaur PA-C Taking Active   multivitamin with minerals tablet [id number] Yes Take 1 tablet by mouth once daily. Historical Provider, MD Taking Active   naloxone (Narcan) 4 mg/0.1 mL nasal spray [id number] Yes Administer 1 spray (4 mg) into affected nostril(s) if needed for opioid reversal. May repeat every 2-3 minutes if needed, alternating nostrils, until medical assistance becomes available. GRISEL Mendez-CNP Taking Active   ondansetron (Zofran) 4 mg tablet [id number] Yes Take 1 tablet (4 mg) by mouth every 8 hours if needed for nausea or vomiting. GRISEL Albert-CNP Taking Active     Discontinued 08/08/24 0907     Discontinued 08/08/24 1536   oxyCODONE (Roxicodone) 10 mg immediate release tablet [id number] Yes Take 1 tablet (10 mg) by mouth every 4 hours if needed for severe pain (7 - 10) (pain). Britni Ely  APRN-CNP Taking Active   oxygen (O2) gas therapy [id number] Yes Inhale 1 each continuously. Marlene Harmon, APRN-CNP Taking Active   sennosides-docusate sodium (Nita-Colace) 8.6-50 mg tablet [id number] Yes Take 2 tablets by mouth every 12 hours. Historical Provider, MD Taking Active   warfarin (Coumadin) 5 mg tablet [id number] Yes Take 1 tablet (5 mg) by mouth once daily in the evening. Erich Whaley MD Taking Active                   Past Medical History:   She has a past medical history of Asthma ([facility]), CHF (congestive heart failure) ([facility]), Chronic pain disorder, Compression of vein (02/19/2020), and Hypotension, unspecified (12/10/2020).    Past Surgical History:   She has a past surgical history that includes Appendectomy (08/05/2013); Cholecystectomy (08/05/2013); Other surgical history (05/13/2014); Other surgical history (10/09/2014); Other surgical history (06/09/2014); MR angio head wo IV contrast (8/16/2014); MR angio neck wo IV contrast (8/16/2014); IR CVC tunneled (3/13/2015); IR CVC tunneled (1/29/2016); IR CVC tunneled (1/17/2018); IR CVC tunneled (2/22/2018); IR CVC tunneled (3/22/2018); IR CVC tunneled (4/18/2018); IR CVC tunneled (5/16/2018); IR CVC tunneled (6/12/2018); US guided biopsy lymph node superficial (9/17/2019); CT guided percutaneous biopsy LYMPH node superficial (9/19/2019); IR CVC tunneled (1/21/2020); IR CVC tunneled (2/28/2020); IR CVC tunneled (4/10/2020); IR CVC tunneled (5/22/2020); IR CVC tunneled (6/19/2020); IR CVC tunneled (7/23/2020); IR CVC tunneled (9/4/2020); IR CVC tunneled (10/9/2020); IR CVC tunneled (11/13/2020); IR CVC tunneled (12/18/2020); IR CVC tunneled (1/22/2021); IR CVC tunneled (2/26/2021); IR CVC tunneled (4/2/2021); IR CVC tunneled (5/7/2021); IR CVC tunneled (6/11/2021); IR CVC tunneled (7/16/2021); IR CVC tunneled (8/20/2021); IR CVC tunneled (9/24/2021); IR CVC tunneled (10/29/2021); IR CVC tunneled (12/3/2021); IR CVC tunneled (1/7/2022); IR CVC  tunneled (2/23/2022); IR CVC tunneled (3/25/2022); IR CVC tunneled (4/22/2022); IR CVC tunneled (6/3/2022); IR CVC tunneled (9/18/2017); IR CVC tunneled (10/30/2017); IR CVC tunneled (12/19/2017); IR CVC tunneled (1/6/2023); IR CVC tunneled (2/24/2023); IR CVC tunneled (7/8/2022); IR CVC tunneled (8/12/2022); IR CVC tunneled (9/16/2022); CT angio neck (10/19/2022); IR CVC tunneled (10/20/2022); IR CVC tunneled (11/29/2022); IR CVC tunneled (3/31/2023); IR CVC tunneled (6/9/2023); IR CVC tunneled (7/20/2023); IR CVC tunneled (8/16/2023); IR CVC tunneled (9/21/2023); IR CVC nontunneled (10/26/2023); and IR CVC nontunneled (12/7/2023).    Family History:   Family History   Problem Relation    Diabetes Father    Gout Father    Sickle cell trait Father    Seizures Sister    Diabetes Other    Uterine cancer Other    Other (congenital blindness) Cousin     Social History:   Senait Narvaez  reports that she quit smoking about 10 years ago. Her smoking use included cigarettes. She has been exposed to tobacco smoke. She has never used smokeless tobacco.  reports that she does not currently use drugs.    EXAMINATION FINDINGS   /64 (BP Location: Left arm, Patient Position: Sitting)   Pulse 72   Temp 35.9 °C (96.6 °F) (Temporal)   Resp 16   Wt 106 kg (233 lb 8 oz)   SpO2 90%   BMI 40.08 kg/m²   2.19 meters squared    Physical Exam  Vitals and nursing note reviewed.   Constitutional:       General: She is not in acute distress.     Appearance: She is not toxic-appearing.   Neck:      Vascular: No carotid bruit.      Comments: SVC syndrome with distension of her neck veins  Cardiovascular:      Rate and Rhythm: Normal rate and regular rhythm.      Pulses: Normal pulses.      Heart sounds: No murmur heard.     No gallop.   Pulmonary:      Effort: Pulmonary effort is normal. No respiratory distress.      Breath sounds: Normal breath sounds. No wheezing.   Abdominal:      General: Bowel sounds are normal.      Palpations:  Abdomen is soft.      Tenderness: There is no abdominal tenderness. There is no guarding.   Musculoskeletal:         General: No swelling or deformity.      Cervical back: No rigidity or tenderness.      Right lower leg: No edema.      Left lower leg: No edema.   Lymphadenopathy:      Cervical: No cervical adenopathy.   Skin:     General: Skin is warm.      Capillary Refill: Capillary refill takes less than 2 seconds.      Findings: No lesion or rash.   Neurological:      General: No focal deficit present.      Mental Status: She is alert and oriented to person, place, and time. Mental status is at baseline.      Cranial Nerves: No cranial nerve deficit.      Motor: No weakness.      Gait: Gait normal.   Psychiatric:         Mood and Affect: Mood normal.         Behavior: Behavior normal.       LABS   Lab on 08/12/2024   Component Date Value Ref Range Status    WBC 08/12/2024 8.4  4.4 - 11.3 x10*3/uL Final    nRBC 08/12/2024 18.5 (H)  0.0 - 0.0 /100 WBCs Final    RBC 08/12/2024 4.07  4.00 - 5.20 x10*6/uL Final    Hemoglobin 08/12/2024 10.9 (L)  12.0 - 16.0 g/dL Final    Hematocrit 08/12/2024 30.3 (L)  36.0 - 46.0 % Final    MCV 08/12/2024 74 (L)  80 - 100 fL Final    MCH 08/12/2024 26.8  26.0 - 34.0 pg Final    MCHC 08/12/2024 36.0  32.0 - 36.0 g/dL Final    RDW 08/12/2024 31.7 (H)  11.5 - 14.5 % Final    Platelets 08/12/2024 219  150 - 450 x10*3/uL Final    Neutrophils % 08/12/2024 55.7  40.0 - 80.0 % Final    Immature Granulocytes %, Automated 08/12/2024 0.4  0.0 - 0.9 % Final    Immature Granulocyte Count (IG) includes promyelocytes, myelocytes and metamyelocytes but does not include bands. Percent differential counts (%) should be interpreted in the context of the absolute cell counts (cells/UL).    Lymphocytes % 08/12/2024 30.0  13.0 - 44.0 % Final    Monocytes % 08/12/2024 8.0  2.0 - 10.0 % Final    Eosinophils % 08/12/2024 5.7  0.0 - 6.0 % Final    Basophils % 08/12/2024 0.2  0.0 - 2.0 % Final    Neutrophils  Absolute 08/12/2024 4.65  1.20 - 7.70 x10*3/uL Final    Percent differential counts (%) should be interpreted in the context of the absolute cell counts (cells/uL).    Immature Granulocytes Absolute, Au* 08/12/2024 0.03  0.00 - 0.70 x10*3/uL Final    Lymphocytes Absolute 08/12/2024 2.51  1.20 - 4.80 x10*3/uL Final    Monocytes Absolute 08/12/2024 0.67  0.10 - 1.00 x10*3/uL Final    Eosinophils Absolute 08/12/2024 0.48  0.00 - 0.70 x10*3/uL Final    Basophils Absolute 08/12/2024 0.02  0.00 - 0.10 x10*3/uL Final    Automated WBC differential has been confirmed by manual smear.    Glucose 08/12/2024 89  74 - 99 mg/dL Final    Sodium 08/12/2024 140  136 - 145 mmol/L Final    Potassium 08/12/2024 4.2  3.5 - 5.3 mmol/L Final    Chloride 08/12/2024 104  98 - 107 mmol/L Final    Bicarbonate 08/12/2024 30  21 - 32 mmol/L Final    Anion Gap 08/12/2024 10  10 - 20 mmol/L Final    Urea Nitrogen 08/12/2024 12  6 - 23 mg/dL Final    Creatinine 08/12/2024 0.94  0.50 - 1.05 mg/dL Final    eGFR 08/12/2024 72  >60 mL/min/1.73m*2 Final    Calculations of estimated GFR are performed using the 2021 CKD-EPI Study Refit equation without the race variable for the IDMS-Traceable creatinine methods.  https://jasn.asnjournals.org/content/early/2021/09/22/ASN.6568674820    Calcium 08/12/2024 8.9  8.6 - 10.3 mg/dL Final    Albumin 08/12/2024 3.8  3.4 - 5.0 g/dL Final    Alkaline Phosphatase 08/12/2024 128 (H)  33 - 110 U/L Final    Total Protein 08/12/2024 7.0  6.4 - 8.2 g/dL Final    AST 08/12/2024 21  9 - 39 U/L Final    Bilirubin, Total 08/12/2024 3.0 (H)  0.0 - 1.2 mg/dL Final    ALT 08/12/2024 16  7 - 45 U/L Final    Patients treated with Sulfasalazine may generate falsely decreased results for ALT.    LDH 08/12/2024 289 (H)  84 - 246 U/L Final    Retic % 08/12/2024 6.3 (H)  0.5 - 2.0 % Final    Retic Absolute 08/12/2024 0.257 (H)  0.018 - 0.083 x10*6/uL Final    Reticulocyte Hemoglobin 08/12/2024 33  28 - 38 pg Final    Immature Retic  "fraction 08/12/2024 49.2 (H)  <=16.0 % Final    Reticulocytes are measured based on a fluorescent technique. The IRF, or immature reticulocyte fraction, is the percent of reticulocytes that show medium (MFR) or high (HFR) fluorescence.  This value can be used to assess the relative maturity of the reticulocyte population in response to anemia. The \"shift reticulocytes\" are not measured by this technique, eliminating the need for their correction in the reticulocyte index.    RBC Morphology 08/12/2024 See Below   Final    Polychromasia 08/12/2024 Mild   Final    Hypochromia 08/12/2024 Mild   Final    RBC Fragments 08/12/2024 Few   Final    Sickle Cells 08/12/2024 Few   Final    Target Cells 08/12/2024 Many   Final    Ovalocytes 08/12/2024 Few   Final    Teardrop Cells 08/12/2024 Few   Final    Sandra Cells 08/12/2024 Few   Final    Pappenheimer Bodies 08/12/2024 Present   Final    Giant Platelets 08/12/2024 Few   Final              Erich Whaley MD                         "

## 2024-09-10 ENCOUNTER — APPOINTMENT (OUTPATIENT)
Dept: RADIOLOGY | Facility: HOSPITAL | Age: 54
DRG: 811 | End: 2024-09-10
Payer: COMMERCIAL

## 2024-09-10 ENCOUNTER — TELEPHONE (OUTPATIENT)
Dept: HEMATOLOGY/ONCOLOGY | Facility: HOSPITAL | Age: 54
End: 2024-09-10

## 2024-09-10 ENCOUNTER — TELEPHONE (OUTPATIENT)
Dept: HEMATOLOGY/ONCOLOGY | Facility: HOSPITAL | Age: 54
End: 2024-09-10
Payer: COMMERCIAL

## 2024-09-10 ENCOUNTER — HOSPITAL ENCOUNTER (INPATIENT)
Facility: HOSPITAL | Age: 54
End: 2024-09-10
Attending: STUDENT IN AN ORGANIZED HEALTH CARE EDUCATION/TRAINING PROGRAM | Admitting: STUDENT IN AN ORGANIZED HEALTH CARE EDUCATION/TRAINING PROGRAM
Payer: COMMERCIAL

## 2024-09-10 DIAGNOSIS — R21 RASH AND OTHER NONSPECIFIC SKIN ERUPTION: ICD-10-CM

## 2024-09-10 DIAGNOSIS — R60.0 LOCALIZED EDEMA: ICD-10-CM

## 2024-09-10 DIAGNOSIS — K72.00 ACUTE LIVER FAILURE WITHOUT HEPATIC COMA (HHS-HCC): Primary | ICD-10-CM

## 2024-09-10 DIAGNOSIS — M10.9 ACUTE GOUT OF LEFT ANKLE, UNSPECIFIED CAUSE: ICD-10-CM

## 2024-09-10 DIAGNOSIS — J96.21 ACUTE ON CHRONIC RESPIRATORY FAILURE WITH HYPOXIA (MULTI): ICD-10-CM

## 2024-09-10 DIAGNOSIS — Z86.718 PERSONAL HISTORY OF OTHER VENOUS THROMBOSIS AND EMBOLISM: ICD-10-CM

## 2024-09-10 DIAGNOSIS — L27.1 DRUG ERUPTION, FIXED: ICD-10-CM

## 2024-09-10 DIAGNOSIS — D57.00 SICKLE CELL ANEMIA WITH PAIN (MULTI): ICD-10-CM

## 2024-09-10 DIAGNOSIS — Z79.01 WARFARIN ANTICOAGULATION: ICD-10-CM

## 2024-09-10 DIAGNOSIS — R79.89 TROPONIN LEVEL ELEVATED: ICD-10-CM

## 2024-09-10 DIAGNOSIS — I21.4 NSTEMI (NON-ST ELEVATED MYOCARDIAL INFARCTION) (MULTI): ICD-10-CM

## 2024-09-10 DIAGNOSIS — I82.4Z9 DEEP VEIN THROMBOSIS (DVT) OF DISTAL VEIN OF LOWER EXTREMITY, UNSPECIFIED CHRONICITY, UNSPECIFIED LATERALITY (MULTI): ICD-10-CM

## 2024-09-10 DIAGNOSIS — D57.00 SICKLE CELL CRISIS (MULTI): ICD-10-CM

## 2024-09-10 DIAGNOSIS — D57.1 SICKLE CELL ANEMIA WITHOUT CRISIS (MULTI): ICD-10-CM

## 2024-09-10 DIAGNOSIS — D57.01 SICKLE CELL CRISIS ACUTE CHEST SYNDROME (MULTI): ICD-10-CM

## 2024-09-10 LAB
ABO GROUP (TYPE) IN BLOOD: NORMAL
ALBUMIN SERPL BCP-MCNC: 3 G/DL (ref 3.4–5)
ALP SERPL-CCNC: 131 U/L (ref 33–110)
ALT SERPL W P-5'-P-CCNC: 2611 U/L (ref 7–45)
ANION GAP BLDA CALCULATED.4IONS-SCNC: 18 MMO/L (ref 10–25)
ANION GAP BLDV CALCULATED.4IONS-SCNC: 19 MMOL/L (ref 10–25)
ANION GAP SERPL CALC-SCNC: 26 MMOL/L (ref 10–20)
ANTIBODY SCREEN: NORMAL
APTT PPP: 29 SECONDS (ref 27–38)
AST SERPL W P-5'-P-CCNC: 4727 U/L (ref 9–39)
BASE EXCESS BLDA CALC-SCNC: -13.2 MMOL/L (ref -2–3)
BASE EXCESS BLDMV CALC-SCNC: -13.3 MMOL/L (ref -2–3)
BASE EXCESS BLDV CALC-SCNC: -10.2 MMOL/L (ref -2–3)
BASOPHILS # BLD MANUAL: 0 X10*3/UL (ref 0–0.1)
BASOPHILS NFR BLD MANUAL: 0 %
BILIRUB DIRECT SERPL-MCNC: 3.1 MG/DL (ref 0–0.3)
BILIRUB SERPL-MCNC: 10.2 MG/DL (ref 0–1.2)
BODY TEMPERATURE: 37 DEGREES CELSIUS
BUN SERPL-MCNC: 64 MG/DL (ref 6–23)
CA-I BLD-SCNC: 1 MMOL/L (ref 1.1–1.33)
CA-I BLDA-SCNC: 1.02 MMOL/L (ref 1.1–1.33)
CA-I BLDV-SCNC: 1.03 MMOL/L (ref 1.1–1.33)
CALCIUM SERPL-MCNC: 8 MG/DL (ref 8.6–10.6)
CARDIAC TROPONIN I PNL SERPL HS: 1315 NG/L (ref 0–34)
CARDIAC TROPONIN I PNL SERPL HS: 1431 NG/L (ref 0–34)
CHLORIDE BLDA-SCNC: 102 MMOL/L (ref 98–107)
CHLORIDE BLDV-SCNC: 102 MMOL/L (ref 98–107)
CHLORIDE SERPL-SCNC: 97 MMOL/L (ref 98–107)
CO2 SERPL-SCNC: 17 MMOL/L (ref 21–32)
CREAT SERPL-MCNC: 3.32 MG/DL (ref 0.5–1.05)
EGFRCR SERPLBLD CKD-EPI 2021: 16 ML/MIN/1.73M*2
EOSINOPHIL # BLD MANUAL: 0 X10*3/UL (ref 0–0.7)
EOSINOPHIL NFR BLD MANUAL: 0 %
ERYTHROCYTE [DISTWIDTH] IN BLOOD BY AUTOMATED COUNT: 30.9 % (ref 11.5–14.5)
FIBRINOGEN PPP-MCNC: 318 MG/DL (ref 200–400)
FLOW: 2 LPM
GLUCOSE BLD MANUAL STRIP-MCNC: 10 MG/DL (ref 74–99)
GLUCOSE BLD MANUAL STRIP-MCNC: 166 MG/DL (ref 74–99)
GLUCOSE BLD MANUAL STRIP-MCNC: 67 MG/DL (ref 74–99)
GLUCOSE BLDA-MCNC: 126 MG/DL (ref 74–99)
GLUCOSE BLDV-MCNC: 49 MG/DL (ref 74–99)
GLUCOSE SERPL-MCNC: 53 MG/DL (ref 74–99)
HAPTOGLOB SERPL NEPH-MCNC: <30 MG/DL (ref 30–200)
HCO3 BLDA-SCNC: 15.6 MMOL/L (ref 22–26)
HCO3 BLDMV-SCNC: 16.5 MMOL/L (ref 22–26)
HCO3 BLDV-SCNC: 17.2 MMOL/L (ref 22–26)
HCT VFR BLD AUTO: 17.7 % (ref 36–46)
HCT VFR BLD EST: 22 % (ref 36–46)
HCT VFR BLD EST: 29 % (ref 36–46)
HGB BLD-MCNC: 6.6 G/DL (ref 12–16)
HGB BLDA-MCNC: 9.6 G/DL (ref 12–16)
HGB BLDV-MCNC: 7.3 G/DL (ref 12–16)
HOWELL-JOLLY BOD BLD QL SMEAR: PRESENT
IMM GRANULOCYTES # BLD AUTO: 0.56 X10*3/UL (ref 0–0.7)
IMM GRANULOCYTES NFR BLD AUTO: 2.3 % (ref 0–0.9)
INHALED O2 CONCENTRATION: 0 %
INHALED O2 CONCENTRATION: 21 %
INHALED O2 CONCENTRATION: 60 %
INR PPP: 3 (ref 0.9–1.1)
LACTATE BLDA-SCNC: 9.6 MMOL/L (ref 0.4–2)
LACTATE BLDV-SCNC: 9.5 MMOL/L (ref 0.4–2)
LDH SERPL L TO P-CCNC: ABNORMAL U/L (ref 84–246)
LYMPHOCYTES # BLD MANUAL: 0.25 X10*3/UL (ref 1.2–4.8)
LYMPHOCYTES NFR BLD MANUAL: 1 %
MAGNESIUM SERPL-MCNC: 2.57 MG/DL (ref 1.6–2.4)
MCH RBC QN AUTO: 29.7 PG (ref 26–34)
MCHC RBC AUTO-ENTMCNC: 37.3 G/DL (ref 32–36)
MCV RBC AUTO: 80 FL (ref 80–100)
METAMYELOCYTES # BLD MANUAL: 0.5 X10*3/UL
METAMYELOCYTES NFR BLD MANUAL: 2 %
MONOCYTES # BLD MANUAL: 0.74 X10*3/UL (ref 0.1–1)
MONOCYTES NFR BLD MANUAL: 3 %
MYELOCYTES # BLD MANUAL: 0.74 X10*3/UL
MYELOCYTES NFR BLD MANUAL: 3 %
NEUTROPHILS # BLD MANUAL: 22.07 X10*3/UL (ref 1.2–7.7)
NEUTS BAND # BLD MANUAL: 5.95 X10*3/UL (ref 0–0.7)
NEUTS BAND NFR BLD MANUAL: 24 %
NEUTS SEG # BLD MANUAL: 16.12 X10*3/UL (ref 1.2–7)
NEUTS SEG NFR BLD MANUAL: 65 %
NRBC BLD-RTO: 209.8 /100 WBCS (ref 0–0)
OXYHGB MFR BLDA: 90.4 % (ref 94–98)
OXYHGB MFR BLDMV: 59.6 % (ref 45–75)
OXYHGB MFR BLDV: 44.1 % (ref 45–75)
PAPPENHEIMER BOD BLD QL SMEAR: PRESENT
PCO2 BLDA: 48 MM HG (ref 38–42)
PCO2 BLDMV: 53 MM HG (ref 41–51)
PCO2 BLDV: 45 MM HG (ref 41–51)
PH BLDA: 7.12 PH (ref 7.38–7.42)
PH BLDMV: 7.1 PH (ref 7.33–7.43)
PH BLDV: 7.19 PH (ref 7.33–7.43)
PHOSPHATE SERPL-MCNC: 7.4 MG/DL (ref 2.5–4.9)
PLATELET # BLD AUTO: 111 X10*3/UL (ref 150–450)
PO2 BLDA: 110 MM HG (ref 85–95)
PO2 BLDMV: 56 MM HG (ref 35–45)
PO2 BLDV: 44 MM HG (ref 35–45)
POLYCHROMASIA BLD QL SMEAR: ABNORMAL
POTASSIUM BLDA-SCNC: 6.9 MMOL/L (ref 3.5–5.3)
POTASSIUM BLDV-SCNC: 6.8 MMOL/L (ref 3.5–5.3)
POTASSIUM SERPL-SCNC: 6.3 MMOL/L (ref 3.5–5.3)
PROT SERPL-MCNC: 5.5 G/DL (ref 6.4–8.2)
PROTHROMBIN TIME: 33.7 SECONDS (ref 9.8–12.8)
RBC # BLD AUTO: 2.22 X10*6/UL (ref 4–5.2)
RBC MORPH BLD: ABNORMAL
RH FACTOR (ANTIGEN D): NORMAL
SAO2 % BLDA: 94 % (ref 94–100)
SAO2 % BLDMV: 61 % (ref 45–75)
SAO2 % BLDV: 45 % (ref 45–75)
SCHISTOCYTES BLD QL SMEAR: ABNORMAL
SICKLE CELLS BLD QL SMEAR: ABNORMAL
SODIUM BLDA-SCNC: 129 MMOL/L (ref 136–145)
SODIUM BLDV-SCNC: 131 MMOL/L (ref 136–145)
SODIUM SERPL-SCNC: 134 MMOL/L (ref 136–145)
TARGETS BLD QL SMEAR: ABNORMAL
TOTAL CELLS COUNTED BLD: 100
VARIANT LYMPHS # BLD MANUAL: 0.5 X10*3/UL (ref 0–0.5)
VARIANT LYMPHS NFR BLD: 2 %
WBC # BLD AUTO: 24.8 X10*3/UL (ref 4.4–11.3)

## 2024-09-10 PROCEDURE — 82435 ASSAY OF BLOOD CHLORIDE: CPT

## 2024-09-10 PROCEDURE — 86922 COMPATIBILITY TEST ANTIGLOB: CPT

## 2024-09-10 PROCEDURE — 82947 ASSAY GLUCOSE BLOOD QUANT: CPT

## 2024-09-10 PROCEDURE — 87086 URINE CULTURE/COLONY COUNT: CPT

## 2024-09-10 PROCEDURE — 81001 URINALYSIS AUTO W/SCOPE: CPT

## 2024-09-10 PROCEDURE — 84100 ASSAY OF PHOSPHORUS: CPT

## 2024-09-10 PROCEDURE — 84132 ASSAY OF SERUM POTASSIUM: CPT | Performed by: STUDENT IN AN ORGANIZED HEALTH CARE EDUCATION/TRAINING PROGRAM

## 2024-09-10 PROCEDURE — 93010 ELECTROCARDIOGRAM REPORT: CPT | Performed by: INTERNAL MEDICINE

## 2024-09-10 PROCEDURE — 71045 X-RAY EXAM CHEST 1 VIEW: CPT

## 2024-09-10 PROCEDURE — 2500000005 HC RX 250 GENERAL PHARMACY W/O HCPCS: Performed by: INTERNAL MEDICINE

## 2024-09-10 PROCEDURE — 85027 COMPLETE CBC AUTOMATED: CPT

## 2024-09-10 PROCEDURE — 2500000004 HC RX 250 GENERAL PHARMACY W/ HCPCS (ALT 636 FOR OP/ED)

## 2024-09-10 PROCEDURE — 85384 FIBRINOGEN ACTIVITY: CPT | Performed by: INTERNAL MEDICINE

## 2024-09-10 PROCEDURE — 82435 ASSAY OF BLOOD CHLORIDE: CPT | Performed by: STUDENT IN AN ORGANIZED HEALTH CARE EDUCATION/TRAINING PROGRAM

## 2024-09-10 PROCEDURE — 87040 BLOOD CULTURE FOR BACTERIA: CPT

## 2024-09-10 PROCEDURE — 71045 X-RAY EXAM CHEST 1 VIEW: CPT | Performed by: RADIOLOGY

## 2024-09-10 PROCEDURE — 85060 BLOOD SMEAR INTERPRETATION: CPT

## 2024-09-10 PROCEDURE — 86901 BLOOD TYPING SEROLOGIC RH(D): CPT

## 2024-09-10 PROCEDURE — 36415 COLL VENOUS BLD VENIPUNCTURE: CPT

## 2024-09-10 PROCEDURE — 85610 PROTHROMBIN TIME: CPT

## 2024-09-10 PROCEDURE — 82436 ASSAY OF URINE CHLORIDE: CPT

## 2024-09-10 PROCEDURE — 94002 VENT MGMT INPAT INIT DAY: CPT

## 2024-09-10 PROCEDURE — 83020 HEMOGLOBIN ELECTROPHORESIS: CPT

## 2024-09-10 PROCEDURE — 70450 CT HEAD/BRAIN W/O DYE: CPT

## 2024-09-10 PROCEDURE — 2500000004 HC RX 250 GENERAL PHARMACY W/ HCPCS (ALT 636 FOR OP/ED): Performed by: INTERNAL MEDICINE

## 2024-09-10 PROCEDURE — 37799 UNLISTED PX VASCULAR SURGERY: CPT

## 2024-09-10 PROCEDURE — 83735 ASSAY OF MAGNESIUM: CPT

## 2024-09-10 PROCEDURE — 74177 CT ABD & PELVIS W/CONTRAST: CPT

## 2024-09-10 PROCEDURE — 84075 ASSAY ALKALINE PHOSPHATASE: CPT

## 2024-09-10 PROCEDURE — 82805 BLOOD GASES W/O2 SATURATION: CPT

## 2024-09-10 PROCEDURE — 82330 ASSAY OF CALCIUM: CPT

## 2024-09-10 PROCEDURE — 84132 ASSAY OF SERUM POTASSIUM: CPT

## 2024-09-10 PROCEDURE — 85007 BL SMEAR W/DIFF WBC COUNT: CPT

## 2024-09-10 PROCEDURE — 80074 ACUTE HEPATITIS PANEL: CPT

## 2024-09-10 PROCEDURE — 84484 ASSAY OF TROPONIN QUANT: CPT

## 2024-09-10 PROCEDURE — 74018 RADEX ABDOMEN 1 VIEW: CPT

## 2024-09-10 PROCEDURE — 85045 AUTOMATED RETICULOCYTE COUNT: CPT

## 2024-09-10 PROCEDURE — 99291 CRITICAL CARE FIRST HOUR: CPT

## 2024-09-10 PROCEDURE — 82570 ASSAY OF URINE CREATININE: CPT

## 2024-09-10 PROCEDURE — 83010 ASSAY OF HAPTOGLOBIN QUANT: CPT

## 2024-09-10 PROCEDURE — 99292 CRITICAL CARE ADDL 30 MIN: CPT

## 2024-09-10 PROCEDURE — 2500000005 HC RX 250 GENERAL PHARMACY W/O HCPCS

## 2024-09-10 PROCEDURE — 2500000002 HC RX 250 W HCPCS SELF ADMINISTERED DRUGS (ALT 637 FOR MEDICARE OP, ALT 636 FOR OP/ED)

## 2024-09-10 PROCEDURE — 83615 LACTATE (LD) (LDH) ENZYME: CPT

## 2024-09-10 PROCEDURE — 74018 RADEX ABDOMEN 1 VIEW: CPT | Performed by: RADIOLOGY

## 2024-09-10 PROCEDURE — 83021 HEMOGLOBIN CHROMOTOGRAPHY: CPT

## 2024-09-10 PROCEDURE — 82248 BILIRUBIN DIRECT: CPT

## 2024-09-10 PROCEDURE — 2020000001 HC ICU ROOM DAILY

## 2024-09-10 RX ORDER — CALCIUM GLUCONATE 98 MG/ML
INJECTION, SOLUTION INTRAVENOUS
Status: COMPLETED
Start: 2024-09-10 | End: 2024-09-10

## 2024-09-10 RX ORDER — DEXTROSE 50 % IN WATER (D50W) INTRAVENOUS SYRINGE
12.5
Status: DISPENSED | OUTPATIENT
Start: 2024-09-10

## 2024-09-10 RX ORDER — ROCURONIUM BROMIDE 10 MG/ML
INJECTION, SOLUTION INTRAVENOUS CODE/TRAUMA/SEDATION MEDICATION
Status: COMPLETED | OUTPATIENT
Start: 2024-09-10 | End: 2024-09-10

## 2024-09-10 RX ORDER — FENTANYL CITRATE 50 UG/ML
INJECTION, SOLUTION INTRAMUSCULAR; INTRAVENOUS
Status: DISPENSED
Start: 2024-09-10 | End: 2024-09-11

## 2024-09-10 RX ORDER — PROPOFOL 10 MG/ML
5-50 INJECTION, EMULSION INTRAVENOUS CONTINUOUS
Status: DISCONTINUED | OUTPATIENT
Start: 2024-09-10 | End: 2024-09-16 | Stop reason: WASHOUT

## 2024-09-10 RX ORDER — DEXTROSE MONOHYDRATE 100 MG/ML
50 INJECTION, SOLUTION INTRAVENOUS CONTINUOUS
Status: DISCONTINUED | OUTPATIENT
Start: 2024-09-10 | End: 2024-09-11

## 2024-09-10 RX ORDER — ETOMIDATE 2 MG/ML
INJECTION INTRAVENOUS CODE/TRAUMA/SEDATION MEDICATION
Status: COMPLETED | OUTPATIENT
Start: 2024-09-10 | End: 2024-09-10

## 2024-09-10 RX ORDER — FENTANYL CITRATE 50 UG/ML
INJECTION, SOLUTION INTRAMUSCULAR; INTRAVENOUS CODE/TRAUMA/SEDATION MEDICATION
Status: COMPLETED | OUTPATIENT
Start: 2024-09-10 | End: 2024-09-10

## 2024-09-10 RX ORDER — VANCOMYCIN HYDROCHLORIDE 1 G/20ML
INJECTION, POWDER, LYOPHILIZED, FOR SOLUTION INTRAVENOUS DAILY PRN
Status: DISCONTINUED | OUTPATIENT
Start: 2024-09-10 | End: 2024-09-13

## 2024-09-10 RX ORDER — DEXTROSE 50 % IN WATER (D50W) INTRAVENOUS SYRINGE
25 ONCE
Status: DISCONTINUED | OUTPATIENT
Start: 2024-09-10 | End: 2024-09-10

## 2024-09-10 RX ORDER — INDOMETHACIN 25 MG/1
CAPSULE ORAL
Status: COMPLETED
Start: 2024-09-10 | End: 2024-09-10

## 2024-09-10 RX ORDER — INDOMETHACIN 25 MG/1
50 CAPSULE ORAL ONCE
Status: COMPLETED | OUTPATIENT
Start: 2024-09-10 | End: 2024-09-10

## 2024-09-10 RX ORDER — PHENYLEPHRINE HCL IN 0.9% NACL 0.4MG/10ML
SYRINGE (ML) INTRAVENOUS
Status: DISPENSED
Start: 2024-09-10 | End: 2024-09-11

## 2024-09-10 RX ORDER — FENTANYL CITRATE 50 UG/ML
50 INJECTION, SOLUTION INTRAMUSCULAR; INTRAVENOUS
Status: DISCONTINUED | OUTPATIENT
Start: 2024-09-10 | End: 2024-09-10

## 2024-09-10 RX ORDER — ONDANSETRON HYDROCHLORIDE 2 MG/ML
INJECTION, SOLUTION INTRAVENOUS
Status: COMPLETED
Start: 2024-09-10 | End: 2024-09-10

## 2024-09-10 RX ORDER — CALCIUM GLUCONATE 98 MG/ML
1 INJECTION, SOLUTION INTRAVENOUS ONCE
Status: DISCONTINUED | OUTPATIENT
Start: 2024-09-10 | End: 2024-09-10

## 2024-09-10 RX ORDER — FENTANYL CITRATE-0.9 % NACL/PF 10 MCG/ML
0-300 PLASTIC BAG, INJECTION (ML) INTRAVENOUS CONTINUOUS
Status: DISCONTINUED | OUTPATIENT
Start: 2024-09-10 | End: 2024-09-16

## 2024-09-10 RX ORDER — FENTANYL CITRATE-0.9 % NACL/PF 10 MCG/ML
PLASTIC BAG, INJECTION (ML) INTRAVENOUS
Status: COMPLETED
Start: 2024-09-10 | End: 2024-09-10

## 2024-09-10 RX ORDER — DEXTROSE 50 % IN WATER (D50W) INTRAVENOUS SYRINGE
Status: COMPLETED
Start: 2024-09-10 | End: 2024-09-10

## 2024-09-10 RX ORDER — ETOMIDATE 2 MG/ML
INJECTION INTRAVENOUS
Status: DISPENSED
Start: 2024-09-10 | End: 2024-09-11

## 2024-09-10 RX ORDER — HYDROMORPHONE HYDROCHLORIDE 1 MG/ML
0.5 INJECTION, SOLUTION INTRAMUSCULAR; INTRAVENOUS; SUBCUTANEOUS EVERY 2 HOUR PRN
Status: DISCONTINUED | OUTPATIENT
Start: 2024-09-10 | End: 2024-09-11

## 2024-09-10 RX ORDER — DEXTROSE 50 % IN WATER (D50W) INTRAVENOUS SYRINGE
25
Status: ACTIVE | OUTPATIENT
Start: 2024-09-10

## 2024-09-10 RX ORDER — NOREPINEPHRINE BITARTRATE/D5W 8 MG/250ML
.01-1 PLASTIC BAG, INJECTION (ML) INTRAVENOUS CONTINUOUS
Status: DISCONTINUED | OUTPATIENT
Start: 2024-09-10 | End: 2024-09-17

## 2024-09-10 RX ORDER — CALCIUM GLUCONATE 98 MG/ML
1 INJECTION, SOLUTION INTRAVENOUS ONCE
Status: COMPLETED | OUTPATIENT
Start: 2024-09-11 | End: 2024-09-10

## 2024-09-10 RX ORDER — DEXTROSE MONOHYDRATE 100 MG/ML
50 INJECTION, SOLUTION INTRAVENOUS CONTINUOUS
Status: DISCONTINUED | OUTPATIENT
Start: 2024-09-10 | End: 2024-09-10

## 2024-09-10 RX ORDER — ROCURONIUM BROMIDE 10 MG/ML
INJECTION, SOLUTION INTRAVENOUS
Status: DISPENSED
Start: 2024-09-10 | End: 2024-09-11

## 2024-09-10 RX ORDER — PROPOFOL 10 MG/ML
INJECTION, EMULSION INTRAVENOUS
Status: COMPLETED
Start: 2024-09-10 | End: 2024-09-10

## 2024-09-10 RX ORDER — DEXTROSE 50 % IN WATER (D50W) INTRAVENOUS SYRINGE
25 ONCE
Status: COMPLETED | OUTPATIENT
Start: 2024-09-10 | End: 2024-09-10

## 2024-09-10 RX ORDER — ONDANSETRON HYDROCHLORIDE 2 MG/ML
4 INJECTION, SOLUTION INTRAVENOUS ONCE
Status: COMPLETED | OUTPATIENT
Start: 2024-09-10 | End: 2024-09-10

## 2024-09-10 ASSESSMENT — PAIN - FUNCTIONAL ASSESSMENT: PAIN_FUNCTIONAL_ASSESSMENT: 0-10

## 2024-09-10 ASSESSMENT — PAIN SCALES - GENERAL
PAINLEVEL_OUTOF10: 8
PAINLEVEL_OUTOF10: 8

## 2024-09-10 NOTE — TELEPHONE ENCOUNTER
Patient called, [facility] appointment scheduled for 9/10@ 10am. Patient confirmed she has transportation.

## 2024-09-10 NOTE — H&P
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     Patient reporting diffuse body pain starting around 8:30AM on 9/10 when she was resting. This feels similar to her typical pain crises and is rated 7/10. Pain is especially bad in her legs. She states sometimes unknown what triggers her pain crises and currently unsure of trigger. Endorses aching chest pain without worsening with inspiration though this chest pain is typical of her pain crises. Also endorsing headache, nausea, and lower extremity edema. Denies dyspnea, cough, palpitations, fevers, chills, diarrhea/constipation, blood in stool/black tarry stool, dysuria, changes in urination. Did not eat or drink today. She would be okay with an exchange transfusion if it were necessary and she says it has been a while since her last one.     Patient also endorsing new tongue swelling. No known allergies and denies itching/feeling like throat is closing.    Social history:  Lives alone  No mobility device  No smoking, alcohol, no drug use  Retired     In the OSH ED:  - Vitals:   T 36.5, HR 78, /56, SpO2 95% on RA, Pain 10/10  - Labs:   CBC: WBC 30.44, Hgb 7.4, plt 91   BMP: Na 133, K 8.0, Cl 94, HCO3 14, BUN 55, Cr 3.05, glu 38  POCT glucose 96  LFT: Ca 8.2, tprot 6.4, alb 3.4, alkphos 156, , , tbili 11.1   Electrolytes: PO4 8.0, Mg 2.7   hs-TnI 322 -> 323, lactate 11.0 -> 9.5   BCx x2 drawn   Retic 1.8%, abs retic 0.043    Uric acid 15.0   UA: turbid, 2+ blood, 1+ protein, 250LE, negative nitrite, rare bacteria, few squamous       - Imagin/10 CT A/P wo  IV contrast at OSH: no acute findings  Abdomen / Pelvis:   Liver: No lesions identified in the unenhanced liver.   Biliary: No biliary ductal dilation.  Prior cholecystectomy.   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   Pancreas: Unremarkable.   Adrenals: No mass.   Kidneys: No calculus, hydronephrosis or finding to suggest a cyst or mass   in the unenhanced kidneys.   GI Tract: Bowel loops are nondilated with no findings of obstruction.   Lymph Nodes: No lymphadenopathy.   Mesentery/peritoneum: No ascites.   Retroperitoneum: No mass.   Vasculature: There is aortic atherosclerotic calcification.  No aneurysmal dilation of the abdominal aorta.   Pelvis: No pelvic lymphadenopathy or fluid collection.   Bones/Soft Tissues: No acute abnormality identified.   Lower thorax: There is mild bibasilar atelectasis or scarring.    9/10 CXR: No acute radiographic abnormality      In the ED, arrived around 11AM. Given dilaudid for 10/10 pain and Zofran. Found to be hyperkalemic with K 8.0, given 5 units regular insulin and multiple D10 boluses given hypoglycemia. Also given calcium gluconate x2, 50meq sodium bicarb, albuterol, fentanyl 50mcg bolus x2, 1L NS, and zosyn. Patient transferred to [facility] MICU.     Upon arrival to the unit:  - Vitals:   T 36.5, HR 65, BP 69/54, RR 18, SpO2 >95% on 2LNC  - Labs:   CBC: WBC 24.8, Hgb 6.6, plt 111   BMP: Na 134, K 6.3, Cl 97, HCO3 17, BUN 64, Cr 3.32, glu 53  LFT: Ca 8.0, tprot 5.5, alb 3.0, alkphos 131, AST 4,727, ALT 2,611, tbili 10.2, dbili 3.1   Electrolytes: PO4 7.4, Mg 2.57   Heme: PT 33.7, INR 3.0   hs-TnI 1,315 -> 1,143 -> 1,186, lactate ###   Fibrinogen 318, LDH>12,000, haptoglobin <30   2141 VBG: pH 7.19, pCO2 45, pO2 44, lactate 9.5   0032 ABG: pH 7.25, pCO2 42, pO2 110, lactate 7.3    - Imaging:   CT C/A/P with IV contrast:  1. Heterogeneous mottled mosaic pattern of enhancement is  present  within the hepatic parenchyma, which can be seen in the setting of  hepatic venous congestion.  2. Diffuse mosaic attenuation of the lung parenchyma, which can be  seen in the setting of small airway disease, pulmonary edema or acute  infection.  3. Additional findings as above.    CTH:  No acute intracranial abnormality.      Chronic small-vessel ischemic changes.    In the MICU, had 7/10 pain so given 0.5mg dilaudid injection. MAPs in mid 50s (notably with patient Aox4 and answering questions appropriately) so given 2L LR and started on levo then vasopressin. Hypoglycemic so given amp D50.  Noting increasing lethargy. Central line and Deonna placed. Due to hyperkalemia given hyperkalemia cocktail. Initially held lokelma pending CT C/A/P to evaluate for source of infection given concern with patient having significant tenderness to abdominal exam. Intubated due to lethargy and inability to compensate from respiratory standpoint for metabolic acidosis (started on fentanyl, propofol). Went for CT C/A/P following intubation.     Also started on vanc/zosyn, given amp bicarb, multiple calcium repletions, and pRBCx2.       Cardiac Hx:      Echocardiogram 01/2024   1. Poorly visualized anatomical structures due to suboptimal image quality.   2. Left ventricular systolic function is normal with a 60-65% estimated ejection fraction.   3. There is reduced right ventricular systolic function.   4. Moderately enlarged right ventricle.            Onco-Echocardiogram 06/2023   Left ventricular systolic function is normal with a 60-65% estimated ejection fraction.  2. Spectral Doppler shows an impaired relaxation pattern of left ventricular diastolic filling.  3. The pulmonary artery is not well visualized.'     1. Left ventricular systolic function is normal with a 60-65% estimated ejection fraction.  2. Poorly visualized anatomical structures due to suboptimal image quality.  3. Suboptimal endocardial definition - would  have benefitted from the use of echocontast. Overall normal LV systolic function without obvious regional wall motion abnormalities. Estimated LVEF 60-65%.  4. Moderately enlarged right ventricle.  5. Findings discussed with primary service at the time of reporting.  6. Compared with the prior exam from 11/11/2022 today's exam is more technically difficult since echocontrast was not utilized. The LV systolic funciton was normal at that time. Note that the RV was not seen well on either study, but appears to be dilated today and both the RV and RA appear to be bowing into the left side suggestive of elevated right sided pressures. Reported as normal in size previously, but not well seen. Unclear if the RV dilatation is new today.    Cath: 06/2023  Coronary Angiography:  The coronary circulation is right dominant.     Left Main Coronary Artery:  The left main coronary artery is a normal caliber vessel. The left main arises normally from the left coronary sinus of Valsalva and bifurcates into the LAD and circumflex coronary arteries. The left main coronary artery showed no significant disease or stenosis greater than 30%.     Left Anterior Descending Coronary Artery Distribution:  The left anterior descending coronary artery is a normal caliber vessel. The LAD arises normally from the left main coronary artery. The LAD demonstrated no significant disease or stenosis greater than 30%.     Circumflex Coronary Artery Distribution:  The circumflex coronary artery is a normal caliber vessel. The circumflex arises normally from the left main coronary artery and terminates in the AV groove. The circumflex revealed no significant disease or stenosis greater than 30%.     Right Heart Catheterization:  A balloon tipped catheter was advanced through the right heart to record pressures. Cardiac output was calculated via the Mine method. Elevated ventricular filling pressure. Cardiac output is normal. Elevated pulmonary vascular  resistance. Normal systemic vascular resistance.     Right Coronary Artery Distribution:     The right coronary artery is a normal caliber vessel. The RCA arises normally from the right sinus of Valsalva. The RCA showed no significant disease or stenosis greater than 30%.        Meds    Home medications:  Current Outpatient Medications   Medication Instructions    albuterol 90 mcg/actuation inhaler 2 puffs, inhalation, Every 6 hours PRN    ascorbic acid (VITAMIN C) 500 mg, oral, Daily, As needed    cyclobenzaprine (FLEXERIL) 10 mg, oral, 3 times daily PRN    elderberry fruit 350 mg capsule 1 capsule, oral, Daily    fluticasone (Flonase) 50 mcg/actuation nasal spray 2 sprays, Each Nostril, As needed    folic acid (FOLVITE) 1 mg, oral, Daily    furosemide (LASIX) 20 mg, oral, Every other day    loratadine (Claritin) 10 mg tablet 1 tablet, oral, Daily PRN    metoprolol tartrate (LOPRESSOR) 12.5 mg, oral, 2 times daily    multivitamin with minerals tablet 1 tablet, oral, Daily RT    naloxone (NARCAN) 4 mg, nasal, As needed, May repeat every 2-3 minutes if needed, alternating nostrils, until medical assistance becomes available.    ondansetron (ZOFRAN) 4 mg, oral, Every 8 hours PRN    oxyCODONE (ROXICODONE) 10 mg, oral, Every 4 hours PRN    oxygen (O2) gas therapy 1 each, inhalation, Continuous    sennosides-docusate sodium (Nita-Colace) 8.6-50 mg tablet 2 tablets, oral, Every 12 hours    warfarin (COUMADIN) 5 mg, oral, Every evening        Inpatient medications:  Scheduled medications  etomidate, , ,   fentaNYL, 25 mcg, intravenous, Once  fentaNYL PF, , ,   pantoprazole, 40 mg, intravenous, Daily  perflutren protein A microsphere, 0.5 mL, intravenous, Once in imaging  phenylephrine HCl in 0.9% NaCl, , ,   piperacillin-tazobactam, 4.5 g, intravenous, q6h  rocuronium, , ,   sodium zirconium cyclosilicate, 10 g, oral, TID  sulfur hexafluoride microsphr, 2 mL, intravenous, Once in imaging  [START ON 9/14/2024] thiamine, 100  "mg, intravenous, Daily  thiamine, 500 mg, intravenous, TID      Continuous medications  fentaNYL, 0-300 mcg/hr, Last Rate: 25 mcg/hr (09/11/24 0400)  norepinephrine, 0.01-1 mcg/kg/min, Last Rate: 0.04 mcg/kg/min (09/11/24 0400)  propofol, 5-50 mcg/kg/min, Last Rate: 20 mcg/kg/min (09/11/24 0459)  vasopressin, 0.03 Units/min, Last Rate: Stopped (09/11/24 0045)      PRN medications  PRN medications: dextrose, dextrose, etomidate, fentaNYL, fentaNYL PF, glucagon, glucagon, HYDROmorphone, oxygen, phenylephrine HCl in 0.9% NaCl, rocuronium, vancomycin     Objective    Blood pressure (!) 101/46, pulse 74, temperature 36.2 °C (97.2 °F), temperature source Temporal, resp. rate 24, height 1.651 m (5' 5\"), weight 110 kg (242 lb 8.1 oz), SpO2 100%.     Physical exam:  Constitutional: Well-developed female in pain   HEENT: Normocephalic, atraumatic. EOMI grossly, neck and tongue swelling  Respiratory: Significant upper airway noises vs rhonchi bilaterally, 2L NC when initially examined  Cardiovascular: RRR. No murmurs, gallops, or rubs.   Abdominal: Soft, significantly tender to palpation.   Neuro: Aox4 however increasingly lethargic. Did not complete full neuro exam given urgency to place lines given low MAPS and to intubate however grossly EOMI, moving extremities spontaneously, conversant and following commands  MSK: No LE edema bilaterally. Tender to touch.   Skin: Warm, dry. No rashes or wounds.      Intake/Output Summary (Last 24 hours) at 9/11/2024 0528  Last data filed at 9/11/2024 0449  Gross per 24 hour   Intake 3498.01 ml   Output 400 ml   Net 3098.01 ml       Micro/ID:     Lab Results   Component Value Date    BLOODCULT Loaded on Instrument - Culture in progress 09/10/2024    BLOODCULT Loaded on Instrument - Culture in progress 09/10/2024       Summary of Key Imaging Results  CT chest abdomen pelvis w IV contrast    Result Date: 9/11/2024  STUDY: CT CHEST ABDOMEN PELVIS W IV CONTRAST;  9/11/2024 12:19 am   " INDICATION: Signs/Symptoms:concern for worsened SVC syndrome, also in pain crisis look for acute chest syndrome or PNA given septic appearance.   COMPARISON: CT chest abdomen pelvis 08/12/2023.   ACCESSION NUMBER(S): [id number]   ORDERING CLINICIAN: KWAKU IVERSON   TECHNIQUE: Contiguous axial images of the chest, abdomen, and pelvis were obtained after the intravenous administration of 96 mL Omnipaque 350 contrast.  Coronal and sagittal reformatted images were reconstructed from the axial data.   FINDINGS: CT CHEST:   MEDIASTINUM AND LYMPH NODES:  Enteric tube is seen coursing through the esophagus with distal tip terminating in the gastric antrum.  No enlarged intrathoracic or axillary lymph nodes by imaging criteria. No pneumomediastinum.   VESSELS:  Normal caliber thoracic aorta. No evidence of traumatic aortic injury. No significant aortic atherosclerosis.   HEART: Normal size.  No coronary artery calcifications. No significant pericardial effusion.   LUNG, AIRWAYS, PLEURA:  Diffuse mosaic attenuation bilaterally, which is nonspecific but can be seen in the setting of small airway disease or pulmonary edema. Linear scarring/atelectasis in the lung bases bilaterally. Paraseptal emphysematous changes in the lung bases. No pleural effusions. No pneumothorax.   CHEST WALL SOFT TISSUES: No discernible acute abnormality.   OSSEOUS STRUCTURES: No acute osseous abnormality.     CT ABDOMEN/PELVIS:   ABDOMINAL WALL: No acute abnormality.   LIVER: There is mottled appearance of the periphery of the hepatic parenchyma and along the hepatic dome, which in the setting SVC syndrome may be secondary to hepatic congestion.   BILE DUCTS: No significant intrahepatic or extrahepatic dilatation.   GALLBLADDER: Status post cholecystectomy.   SPLEEN: Status post splenectomy.   PANCREAS: No pancreatic ductal dilatation or mass.   ADRENALS: No significant abnormality.   KIDNEYS, URETERS, BLADDER: Normal in size, enhancing  symmetrically. Scattered simple renal cysts bilaterally. No hydronephrosis or nephroureterolithiasis. Bladder is decompressed with Slater catheter in place and scattered foci of gas. No abnormal bladder wall thickening.   REPRODUCTIVE ORGANS: Uterus is present.   VESSELS: No acute vascular injury.   LYMPH NODES/RETROPERITONEUM: No acute retroperitoneal abnormality. No enlarged lymph nodes.   BOWEL/MESENTERY/PERITONEUM: Stomach is decompressed, with distal tip of enteric tube within the gastric antrum. Small bowel is normal in caliber, without abnormal wall thickening. There is fatty replacement of the ileocecal valve. The large bowel is normal in caliber without abnormal wall thickening. Scattered colonic diverticulosis without evidence of acute inflammation. Normal appendix. No ascites, free air or fluid collection.   MUSCULOSKELETAL: No acute osseous abnormality.       1. Heterogeneous mottled mosaic pattern of enhancement is present within the hepatic parenchyma, which can be seen in the setting of hepatic venous congestion. 2. Diffuse mosaic attenuation of the lung parenchyma, which can be seen in the setting of small airway disease, pulmonary edema or acute infection. 3. Additional findings as above.   I personally reviewed the images/study and I agree with the findings as stated by Femi Alba MD (Radiology Resident). This study was interpreted at [place], [place].   MACRO: None.     Dictation workstation:   [id number]    CT head wo IV contrast    Result Date: 9/11/2024  STUDY: CT HEAD WO IV CONTRAST;  9/11/2024 12:19 am   INDICATION: Signs/Symptoms:new asymmetric face swelling, lethargy.     COMPARISON: CT head 08/12/2023.   ACCESSION NUMBER(S): [id number]   ORDERING CLINICIAN: KWAKU IVERSON   TECHNIQUE: Noncontrast axial CT scan of head was performed. Angled reformats in brain and bone windows were generated. The images were reviewed in  bone, brain, blood and soft tissue windows.   FINDINGS: CSF Spaces: Ventricles and sulci are age concordant. Basal cisterns are patent. There is no extraaxial fluid collection.   Parenchyma: No acute intraparenchymal hemorrhage or parenchymal evidence of acute large territorial ischemic infarct. Subtle patchy periventricular and subcortical white matter hypoattenuation, which while nonspecific are likely sequelae of chronic small-vessel ischemic changes. The grey-white differentiation is intact. There is no mass effect or midline shift..   Calvarium: The calvarium is unremarkable.   Paranasal sinuses and mastoids: Visualized paranasal sinuses and mastoids are clear.       No acute intracranial abnormality.   Chronic small-vessel ischemic changes.   I personally reviewed the images/study and I agree with the findings as stated by Femi Alba MD (Radiology Resident). This study was interpreted at [place], [place].   MACRO: None     Dictation workstation:   [id number]    XR chest 1 view    Result Date: 9/10/2024  STUDY: XR CHEST 1 VIEW; XR ABDOMEN 1 VIEW;  9/10/2024 11:05 pm; 9/10/2024 11:07 pm   INDICATION: Signs/Symptoms:ETT placement confirmation; Signs/Symptoms:Eval NG tube.   COMPARISON: Chest radiograph 09/10/2024   ACCESSION NUMBER(S): [id number]; [id number]   ORDERING CLINICIAN: KWAKU IVERSON   FINDINGS: Supine AP radiographs of the chest and abdomen were provided.   Endotracheal tube positioned 5.4 cm above the todd. Enteric tube coursing below the level of the diaphragm with distal tip projecting over the right hemiabdomen in the expected location of the gastric antrum. Right internal jugular central venous catheter with distal tip projecting over the upper SVC. Right upper quadrant surgical clips are noted.   CARDIOMEDIASTINAL SILHOUETTE: Cardiomediastinal silhouette is stable in size and configuration.   LUNGS: Mild interstitial  edema. No focal consolidation, pleural effusion or detectable pneumothorax.   ABDOMEN: Nonspecific nonobstructive bowel gas pattern. Limited evaluation of pneumoperitoneum on supine imaging, however no gross evidence of free air is noted. Moderate colonic stool burden.   BONES: No acute osseous changes.       1.  Mild interstitial edema. 2. Medical lines and devices as above.   I personally reviewed the images/study and I agree with the findings as stated by Femi Alba MD (Radiology Resident). This study was interpreted at [place], [place].   MACRO: None     Dictation workstation:   [id number]    XR abdomen 1 view    Result Date: 9/10/2024  STUDY: XR CHEST 1 VIEW; XR ABDOMEN 1 VIEW;  9/10/2024 11:05 pm; 9/10/2024 11:07 pm   INDICATION: Signs/Symptoms:ETT placement confirmation; Signs/Symptoms:Eval NG tube.   COMPARISON: Chest radiograph 09/10/2024   ACCESSION NUMBER(S): [id number]; [id number]   ORDERING CLINICIAN: KWAKU IVERSON   FINDINGS: Supine AP radiographs of the chest and abdomen were provided.   Endotracheal tube positioned 5.4 cm above the todd. Enteric tube coursing below the level of the diaphragm with distal tip projecting over the right hemiabdomen in the expected location of the gastric antrum. Right internal jugular central venous catheter with distal tip projecting over the upper SVC. Right upper quadrant surgical clips are noted.   CARDIOMEDIASTINAL SILHOUETTE: Cardiomediastinal silhouette is stable in size and configuration.   LUNGS: Mild interstitial edema. No focal consolidation, pleural effusion or detectable pneumothorax.   ABDOMEN: Nonspecific nonobstructive bowel gas pattern. Limited evaluation of pneumoperitoneum on supine imaging, however no gross evidence of free air is noted. Moderate colonic stool burden.   BONES: No acute osseous changes.       1.  Mild interstitial edema. 2. Medical lines and devices as above.    I personally reviewed the images/study and I agree with the findings as stated by Femi Alba MD (Radiology Resident). This study was interpreted at [place], [place].   MACRO: None     Dictation workstation:   [id number]    XR chest 1 view    Result Date: 9/10/2024  STUDY: XR CHEST 1 VIEW;  9/10/2024 9:22 pm   INDICATION: Signs/Symptoms:Sickle Cell Crisis.   COMPARISON: Chest radiograph 06/10/2024   ACCESSION NUMBER(S): [id number]   ORDERING CLINICIAN: KWAKU IVERSON   FINDINGS: AP radiograph of the chest was provided.   MEDICAL DEVICES: Right IJ approach central venous catheter with the tip overlying the junction of the right subclavian vein and IVC.   CARDIOMEDIASTINAL SILHOUETTE: Cardiomediastinal silhouette is normal in size and configuration.   LUNGS: Unchanged hazy opacity in the left lower lung with blunting of the left costophrenic angle. No pleural effusions or pneumothorax seen.   ABDOMEN: No remarkable upper abdominal findings.   BONES: No acute osseous changes.       1.  No evidence of acute cardiopulmonary process. 2. Similar appearance of persistent atelectasis/scarring in the left lower lung 3. Right IJ approach central venous catheter with the tip overlying the junction of the right subclavian vein and IVC.   I personally reviewed the images/study and resident's interpretation and I agree with the findings as stated by Toya Coreas MD (resident radiologist). This study was analyzed and interpreted at [place], [place].   MACRO: None     Dictation workstation:   [id number]    CT abdomen pelvis wo IV contrast    Result Date: 9/10/2024  * * *Final Report* * * DATE OF EXAM: Sep 10 2024  2:57PM   EUC   0531  -  CT ABD/PEL WO IVCON  / ACCESSION #  [id number] PROCEDURE REASON: Abdominal abscess/infection suspected      * * * * Physician Interpretation * * * * RESULT: EXAMINATION:  CT ABDOMEN  AND PELVIS WITHOUT IV CONTRAST CLINICAL HISTORY: Elevated bilirubin and LFTs TECHNIQUE: Non-IV contrast imaging of the abdomen and pelvis was performed using standard technique, scanning from just above the dome of the diaphragm to the symphysis pubis.  Unenhanced imaging is limited for the evaluation of some intra-abdominal and pelvic pathology. MQ:  CTAPWO_3 Contrast: IV: None : ml of CT Radiation dose: Integrated Dose-length product (DLP) for this visit =   378 mGy*cm. CT Dose Reduction Employed: Automated exposure control(AEC) and iterative recon COMPARISON: None. RESULT: Abdomen / Pelvis: Liver: No lesions identified in the unenhanced liver. Biliary: No biliary ductal dilation.  Prior cholecystectomy. 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 Pancreas: Unremarkable. Adrenals: No mass. Kidneys: No calculus, hydronephrosis or finding to suggest a cyst or mass in the unenhanced kidneys. GI Tract: Bowel loops are nondilated with no findings of obstruction. Lymph Nodes: No lymphadenopathy. Mesentery/peritoneum: No ascites. Retroperitoneum: No mass. Vasculature: There is aortic atherosclerotic calcification.  No aneurysmal dilation of the abdominal aorta. Pelvis: No pelvic lymphadenopathy or fluid collection. Bones/Soft Tissues: No acute abnormality identified. Lower thorax: There is mild bibasilar atelectasis or scarring. Localizer images: No additional findings.    IMPRESSION: No acute findings in the abdomen or pelvis Transcribed Using Voice Recognition Transcribe Date/Time: Sep 10 2024  3:10P Dictated by: XENA CRAWFORD MD This examination was interpreted and the report reviewed and electronically signed by: XENA CRAWFORD MD on Sep 10 2024  3:19PM  EST    XR chest 1 view    Result Date: 9/10/2024  * * *Final Report* * * DATE OF EXAM: Sep 10 2024 11:47AM   EUX   [id number]  -  XR CHEST 1V FRONTAL PORT  / ACCESSION #  [id number]  PROCEDURE REASON: Shortness of breath      * * * * Physician Interpretation * * * * RESULT: EXAMINATION:  CHEST RADIOGRAPH (PORTABLE SINGLE VIEW AP) Exam Date/Time:  9/10/2024 11:47 AM CLINICAL HISTORY: Shortness of breath MQ:  XCPR_5 Comparison:  01/04/2014 RESULT: Lines, tubes, and devices:  None. Lungs and pleura:  Similar mild left basilar atelectasis.  Otherwise lungs are clear.  No significant pleural effusion. Cardiomediastinal silhouette:  Stable cardiomediastinal silhouette. Other:  .    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 Patient critically ill with hypotension initially requiring 2 pressors, CHARLES, acute liver injury, hemolytic anemia requiring tranfusions, altered mentation with lethargy and not compensating appropriately respiratory wise for acidosis requiring intubation. Multiorgan failure likely in setting of hemolytic anemia with hypovolemia in crisis rather than sepsis however covering with antibiotics broadly. Despite elevated troponin and ST depressions, echocardiogram without significant structural changes to suggest cardiogenic shock. Discussed exchange transfusion with hematology and transfusion medicine, planned potentially for 9/11 AM.     NEUROLOGIC  #AMS  ::Likely in setting of critical illness with contribution of  hypoglycemia  ::Intubated, sedated  ::CTH negative  -c/w fentanyl and propofol  -thiamine 500mg TID, c/w thiamine 100mg daily    #Cauda equina with saddle numbness  -no acute interventions    CARDIOVASCULAR  #Hypotension  ::Mixed So2 demonstrates not entirely septic, may also be hypovolemic vs less likely cardiogenic shock  -continue levophed, titrate to MAP 65+  -continue vasopressin 0.03, wean as able if MAP 65+  -receiving fluids with blood transfusions x2 ovn    #Elevated troponins, downtrended  #EKG with ST depressions  ::Bedside echo by cardiology fellow on admission without wall motion abnormalities/signs of ischemic changes  -troponin trended to peak  -formal cardiology consult in AM  -formal echo  -serial EKGs  -discussed with cardiology fellow, hold on aspirin load and anticoagulation at this time    #Hx SVT  -hold home metoprolol tartrate 12.5mg BID    PULMONARY  #Intubated  #CAN  # pHTN (6/2023)  #Chronic 2L O2 at night use  ::On 2L O2 on arrival, denying dyspnea and cough  ::CT chest with signs of pulmonary edema  -wean vent settings as tolerated  -albuterol nebulizer for wheezing  -hold home Lasix 20    RENAL/  #CHARLES on CKD II  #Severe hyperkalemia  #Hyponatremia  ::CHARLES likely iso hypotension and prerenal  ::Baseline cre 0.8-1  ::Admission cre 3.55 from 3.05 at OSH  ::EKG with peaked T waves  ::Now s/p IV contrast on 9/10 with CT scan  ::9/10 OSH UA UA: turbid, 2+ blood, 1+ protein, 250LE, negative nitrite, rare bacteria, few squamous  -monitor RFP q4hrs   -hyperkalemia cocktail as needed  -lokelma 10g TID  -follow up urine lytes  -consider nephrology consult if concern will need to start dialysis    #Metabolic acidosis  #Elevated lactate  -trend ABG and lactate     #Hypocalcemia  -goal ionized calcium 1.2 for exchange transfusion, replete prn    GASTROINTESTINAL  #Acute liver injury  ::On presentation to  MICU ALT 2611, AST 4727, T bili 10.2, INR 3.0  ::CT with hepatic venous congestion, patient  lacks gallbladder   -trend MELD labs  -consider Hepatology consult if continues to worsen    #Bowel regimen  -c/w sennosides  -c/w miralax    ENDOCRINE  #Hypoglycemia  ::Likely iso critical illness  -q4hr glucose checks with hypoglycemia protocol  -start D10 gtt as needed    HEME/ONC  #Sickle cell crisis  #Hgb SC disease (previously on exchange transfusions q4-6 weeks, last transfusion 3/1/24)  #Hemolytic anemia  ::LDH>12,000, haptoglobin <30, fibrinogen 318, retic % 5.2  -formal consult to sickle cell team in AM (discussed overnight with on call hematology covering sickle cell)  -transfusion medicine consulted, appreciate recs  -pending possible exchange transfusion in AM (will need line), HCT must be >21, iCA must be >1.2 prior to exchange  -pending peripheral smear, hemoglobin electrophoresis,   -daily LDH, retics    #Hx recurrent DVT/PE (on lifelong Coumadin)  # hx of SVC syndrome  ::Per family, facial/neck appearance on presentation at her baseline  -hold home warfarin 5mg daily (INR 3.0), trend INR daily    MSK  #Fibromyalgia  #Raynaud's disease  #Muscle spasms  -no active interventions at this time given above  -holding home flexuril 10mg TID prn muscle spasms due to AMS    INFECTIOUS DISEASE  #Leukocytosis  #Concern for some component of sepsis  ::Unclear infectious source. No acute findings on CT C/A/P. Patient had denied urinary symptoms prior to intubation.   ::Given 2L fluids   -c/w vancomycin  -c/w zosyn 4.5g q6hrs  -pending procal    MISC  -holding home vitamin C, elderberry, loratidine 10mg, zofran 4mg, oxycodone 10mg q4h prn, MVI    N: NPO  A: L radial Deonna, Trialysis, ETT, Slater, OG, pIV  DVT ppx: SCDs, holding home warfarin (INR 3.0)  GI ppx: PPI    Code Status: FULL CODE (confirmed on 9/10)  Surrogate Medical Decision-maker:   Prior to intubation patient listed mother as first decision maker then daughter Tiesha  Mother Tati Salgado has POA paperwork in chart: [phone], Daughter Tiesha Narvaez  [phone]

## 2024-09-11 ENCOUNTER — APPOINTMENT (OUTPATIENT)
Dept: RADIOLOGY | Facility: HOSPITAL | Age: 54
DRG: 811 | End: 2024-09-11
Payer: COMMERCIAL

## 2024-09-11 ENCOUNTER — APPOINTMENT (OUTPATIENT)
Dept: CARDIOLOGY | Facility: HOSPITAL | Age: 54
End: 2024-09-11
Payer: COMMERCIAL

## 2024-09-11 ENCOUNTER — APPOINTMENT (OUTPATIENT)
Dept: OTHER | Facility: HOSPITAL | Age: 54
DRG: 811 | End: 2024-09-11
Payer: COMMERCIAL

## 2024-09-11 ENCOUNTER — APPOINTMENT (OUTPATIENT)
Dept: CARDIOLOGY | Facility: HOSPITAL | Age: 54
DRG: 811 | End: 2024-09-11
Payer: COMMERCIAL

## 2024-09-11 LAB
25(OH)D3 SERPL-MCNC: 8 NG/ML (ref 30–100)
ACANTHOCYTES BLD QL SMEAR: ABNORMAL
ALBUMIN SERPL BCP-MCNC: 2.3 G/DL (ref 3.4–5)
ALBUMIN SERPL BCP-MCNC: 2.3 G/DL (ref 3.4–5)
ALBUMIN SERPL BCP-MCNC: 2.6 G/DL (ref 3.4–5)
ALBUMIN SERPL BCP-MCNC: 2.8 G/DL (ref 3.4–5)
ALP SERPL-CCNC: 209 U/L (ref 33–110)
ALP SERPL-CCNC: 242 U/L (ref 33–110)
ALT SERPL W P-5'-P-CCNC: 3260 U/L (ref 7–45)
ALT SERPL W P-5'-P-CCNC: 4119 U/L (ref 7–45)
ANION GAP BLDA CALCULATED.4IONS-SCNC: 14 MMO/L (ref 10–25)
ANION GAP BLDA CALCULATED.4IONS-SCNC: 16 MMO/L (ref 10–25)
ANION GAP BLDA CALCULATED.4IONS-SCNC: 16 MMO/L (ref 10–25)
ANION GAP SERPL CALC-SCNC: 20 MMOL/L (ref 10–20)
ANION GAP SERPL CALC-SCNC: 20 MMOL/L (ref 10–20)
ANION GAP SERPL CALC-SCNC: 22 MMOL/L (ref 10–20)
ANION GAP SERPL CALC-SCNC: 23 MMOL/L (ref 10–20)
ANION GAP SERPL CALC-SCNC: 24 MMOL/L (ref 10–20)
AORTIC VALVE MEAN GRADIENT: 5 MMHG
AORTIC VALVE MEAN GRADIENT: 6 MMHG
AORTIC VALVE PEAK VELOCITY: 1.66 M/S
AORTIC VALVE PEAK VELOCITY: 1.69 M/S
APAP SERPL-MCNC: 10.1 UG/ML
APAP SERPL-MCNC: <10 UG/ML
APPEARANCE UR: ABNORMAL
APTT PPP: 28 SECONDS (ref 27–38)
APTT PPP: 29 SECONDS (ref 27–38)
APTT PPP: 29 SECONDS (ref 27–38)
AST SERPL W P-5'-P-CCNC: 6218 U/L (ref 9–39)
AST SERPL W P-5'-P-CCNC: ABNORMAL U/L (ref 9–39)
AV PEAK GRADIENT: 11 MMHG
AV PEAK GRADIENT: 11.4 MMHG
AVA (PEAK VEL): 2.73 CM2
AVA (PEAK VEL): 2.99 CM2
AVA (VTI): 2.55 CM2
AVA (VTI): 4.42 CM2
BASE EXCESS BLDA CALC-SCNC: -4.1 MMOL/L (ref -2–3)
BASE EXCESS BLDA CALC-SCNC: -6.5 MMOL/L (ref -2–3)
BASE EXCESS BLDA CALC-SCNC: -8.1 MMOL/L (ref -2–3)
BASO STIPL BLD QL SMEAR: PRESENT
BASOPHILS # BLD AUTO: 0.13 X10*3/UL (ref 0–0.1)
BASOPHILS # BLD MANUAL: 0 X10*3/UL (ref 0–0.1)
BASOPHILS # BLD MANUAL: 0 X10*3/UL (ref 0–0.1)
BASOPHILS NFR BLD AUTO: 0.8 %
BASOPHILS NFR BLD MANUAL: 0 %
BASOPHILS NFR BLD MANUAL: 0 %
BILIRUB DIRECT SERPL-MCNC: 3.7 MG/DL (ref 0–0.3)
BILIRUB DIRECT SERPL-MCNC: 3.7 MG/DL (ref 0–0.3)
BILIRUB SERPL-MCNC: 10.7 MG/DL (ref 0–1.2)
BILIRUB SERPL-MCNC: 8.8 MG/DL (ref 0–1.2)
BILIRUB UR STRIP.AUTO-MCNC: NEGATIVE MG/DL
BLOOD EXPIRATION DATE: NORMAL
BNP SERPL-MCNC: 1512 PG/ML (ref 0–99)
BODY TEMPERATURE: 37 DEGREES CELSIUS
BUN SERPL-MCNC: 61 MG/DL (ref 6–23)
BUN SERPL-MCNC: 66 MG/DL (ref 6–23)
BUN SERPL-MCNC: 67 MG/DL (ref 6–23)
BUN SERPL-MCNC: 67 MG/DL (ref 6–23)
BUN SERPL-MCNC: 69 MG/DL (ref 6–23)
BURR CELLS BLD QL SMEAR: ABNORMAL
BURR CELLS BLD QL SMEAR: NORMAL
CA-I BLD-SCNC: 0.95 MMOL/L (ref 1.1–1.33)
CA-I BLD-SCNC: 1.02 MMOL/L (ref 1.1–1.33)
CA-I BLD-SCNC: 1.06 MMOL/L (ref 1.1–1.33)
CA-I BLDA-SCNC: 1.04 MMOL/L (ref 1.1–1.33)
CA-I BLDA-SCNC: 1.09 MMOL/L (ref 1.1–1.33)
CA-I BLDA-SCNC: 1.11 MMOL/L (ref 1.1–1.33)
CALCIUM SERPL-MCNC: 7.4 MG/DL (ref 8.6–10.6)
CALCIUM SERPL-MCNC: 7.8 MG/DL (ref 8.6–10.6)
CALCIUM SERPL-MCNC: 8 MG/DL (ref 8.6–10.6)
CALCIUM SERPL-MCNC: 8 MG/DL (ref 8.6–10.6)
CALCIUM SERPL-MCNC: 8.5 MG/DL (ref 8.6–10.6)
CARDIAC TROPONIN I PNL SERPL HS: 1186 NG/L (ref 0–34)
CHLORIDE BLDA-SCNC: 100 MMOL/L (ref 98–107)
CHLORIDE BLDA-SCNC: 99 MMOL/L (ref 98–107)
CHLORIDE BLDA-SCNC: 99 MMOL/L (ref 98–107)
CHLORIDE SERPL-SCNC: 95 MMOL/L (ref 98–107)
CHLORIDE SERPL-SCNC: 96 MMOL/L (ref 98–107)
CHLORIDE SERPL-SCNC: 96 MMOL/L (ref 98–107)
CHLORIDE SERPL-SCNC: 97 MMOL/L (ref 98–107)
CHLORIDE SERPL-SCNC: 97 MMOL/L (ref 98–107)
CHLORIDE UR-SCNC: 19 MMOL/L
CHLORIDE/CREATININE (MMOL/G) IN URINE: 21 MMOL/G CREAT (ref 38–318)
CK SERPL-CCNC: 944 U/L (ref 0–215)
CMV IGG AVIDITY SERPL IA-RTO: REACTIVE %
CO2 SERPL-SCNC: 17 MMOL/L (ref 21–32)
CO2 SERPL-SCNC: 18 MMOL/L (ref 21–32)
CO2 SERPL-SCNC: 19 MMOL/L (ref 21–32)
CO2 SERPL-SCNC: 21 MMOL/L (ref 21–32)
CO2 SERPL-SCNC: 22 MMOL/L (ref 21–32)
COLOR UR: YELLOW
CREAT SERPL-MCNC: 3.55 MG/DL (ref 0.5–1.05)
CREAT SERPL-MCNC: 3.76 MG/DL (ref 0.5–1.05)
CREAT SERPL-MCNC: 3.97 MG/DL (ref 0.5–1.05)
CREAT SERPL-MCNC: 4.07 MG/DL (ref 0.5–1.05)
CREAT SERPL-MCNC: 4.4 MG/DL (ref 0.5–1.05)
CREAT UR-MCNC: 90.2 MG/DL (ref 20–320)
DACRYOCYTES BLD QL SMEAR: ABNORMAL
DAT-POLYSPECIFIC: NORMAL
DISPENSE STATUS: NORMAL
EBV EA IGG SER QL: NEGATIVE
EBV NA AB SER QL: POSITIVE
EBV VCA IGG SER IA-ACNC: POSITIVE
EBV VCA IGM SER IA-ACNC: NEGATIVE
EGFRCR SERPLBLD CKD-EPI 2021: 11 ML/MIN/1.73M*2
EGFRCR SERPLBLD CKD-EPI 2021: 12 ML/MIN/1.73M*2
EGFRCR SERPLBLD CKD-EPI 2021: 13 ML/MIN/1.73M*2
EGFRCR SERPLBLD CKD-EPI 2021: 14 ML/MIN/1.73M*2
EGFRCR SERPLBLD CKD-EPI 2021: 15 ML/MIN/1.73M*2
EJECTION FRACTION APICAL 4 CHAMBER: 71.4
EJECTION FRACTION APICAL 4 CHAMBER: 78.2
EJECTION FRACTION: 63 %
EJECTION FRACTION: 68 %
EOSINOPHIL # BLD AUTO: 0.04 X10*3/UL (ref 0–0.7)
EOSINOPHIL # BLD MANUAL: 0 X10*3/UL (ref 0–0.7)
EOSINOPHIL # BLD MANUAL: 0 X10*3/UL (ref 0–0.7)
EOSINOPHIL NFR BLD AUTO: 0.2 %
EOSINOPHIL NFR BLD MANUAL: 0 %
EOSINOPHIL NFR BLD MANUAL: 0 %
ERYTHROCYTE [DISTWIDTH] IN BLOOD BY AUTOMATED COUNT: 18.7 % (ref 11.5–14.5)
ERYTHROCYTE [DISTWIDTH] IN BLOOD BY AUTOMATED COUNT: 19.4 % (ref 11.5–14.5)
ERYTHROCYTE [DISTWIDTH] IN BLOOD BY AUTOMATED COUNT: 24.6 % (ref 11.5–14.5)
ERYTHROCYTE [DISTWIDTH] IN BLOOD BY AUTOMATED COUNT: 31 % (ref 11.5–14.5)
ETHANOL SERPL-MCNC: <10 MG/DL
GLUCOSE BLD MANUAL STRIP-MCNC: 100 MG/DL (ref 74–99)
GLUCOSE BLD MANUAL STRIP-MCNC: 117 MG/DL (ref 74–99)
GLUCOSE BLD MANUAL STRIP-MCNC: 121 MG/DL (ref 74–99)
GLUCOSE BLD MANUAL STRIP-MCNC: 123 MG/DL (ref 74–99)
GLUCOSE BLD MANUAL STRIP-MCNC: 127 MG/DL (ref 74–99)
GLUCOSE BLD MANUAL STRIP-MCNC: 151 MG/DL (ref 74–99)
GLUCOSE BLD MANUAL STRIP-MCNC: 237 MG/DL (ref 74–99)
GLUCOSE BLD MANUAL STRIP-MCNC: 25 MG/DL (ref 74–99)
GLUCOSE BLD MANUAL STRIP-MCNC: 47 MG/DL (ref 74–99)
GLUCOSE BLD MANUAL STRIP-MCNC: 95 MG/DL (ref 74–99)
GLUCOSE BLD MANUAL STRIP-MCNC: 98 MG/DL (ref 74–99)
GLUCOSE BLD MANUAL STRIP-MCNC: <10 MG/DL (ref 74–99)
GLUCOSE BLD MANUAL STRIP-MCNC: <10 MG/DL (ref 74–99)
GLUCOSE BLDA-MCNC: 129 MG/DL (ref 74–99)
GLUCOSE BLDA-MCNC: 265 MG/DL (ref 74–99)
GLUCOSE BLDA-MCNC: 90 MG/DL (ref 74–99)
GLUCOSE SERPL-MCNC: 105 MG/DL (ref 74–99)
GLUCOSE SERPL-MCNC: 116 MG/DL (ref 74–99)
GLUCOSE SERPL-MCNC: 120 MG/DL (ref 74–99)
GLUCOSE SERPL-MCNC: 254 MG/DL (ref 74–99)
GLUCOSE SERPL-MCNC: 76 MG/DL (ref 74–99)
GLUCOSE UR STRIP.AUTO-MCNC: NORMAL MG/DL
HAV IGM SER QL: NONREACTIVE
HBV CORE IGM SER QL: NONREACTIVE
HBV SURFACE AG SERPL QL IA: NONREACTIVE
HCO3 BLDA-SCNC: 18.1 MMOL/L (ref 22–26)
HCO3 BLDA-SCNC: 18.4 MMOL/L (ref 22–26)
HCO3 BLDA-SCNC: 19 MMOL/L (ref 22–26)
HCT VFR BLD AUTO: 15.7 % (ref 36–46)
HCT VFR BLD AUTO: 26 % (ref 36–46)
HCT VFR BLD AUTO: 27.4 % (ref 36–46)
HCT VFR BLD AUTO: 29.5 % (ref 36–46)
HCT VFR BLD EST: 19 % (ref 36–46)
HCT VFR BLD EST: 30 % (ref 36–46)
HCT VFR BLD EST: 33 % (ref 36–46)
HCV AB SER QL: NONREACTIVE
HGB BLD-MCNC: 10.8 G/DL (ref 12–16)
HGB BLD-MCNC: 6 G/DL (ref 12–16)
HGB BLD-MCNC: 9.4 G/DL (ref 12–16)
HGB BLD-MCNC: 9.8 G/DL (ref 12–16)
HGB BLDA-MCNC: 11.1 G/DL (ref 12–16)
HGB BLDA-MCNC: 6.4 G/DL (ref 12–16)
HGB BLDA-MCNC: 9.9 G/DL (ref 12–16)
HGB RETIC QN: 26 PG (ref 28–38)
HGB RETIC QN: 37 PG (ref 28–38)
HOWELL-JOLLY BOD BLD QL SMEAR: PRESENT
IMM GRANULOCYTES # BLD AUTO: 0.12 X10*3/UL (ref 0–0.7)
IMM GRANULOCYTES # BLD AUTO: 0.18 X10*3/UL (ref 0–0.7)
IMM GRANULOCYTES # BLD AUTO: 0.19 X10*3/UL (ref 0–0.7)
IMM GRANULOCYTES NFR BLD AUTO: 0.7 % (ref 0–0.9)
IMM GRANULOCYTES NFR BLD AUTO: 1.1 % (ref 0–0.9)
IMM GRANULOCYTES NFR BLD AUTO: 1.2 % (ref 0–0.9)
IMMATURE RETIC FRACTION: 11 %
IMMATURE RETIC FRACTION: 38.2 %
INHALED O2 CONCENTRATION: 30 %
INHALED O2 CONCENTRATION: 40 %
INHALED O2 CONCENTRATION: 50 %
INR PPP: 2.9 (ref 0.9–1.1)
INR PPP: 2.9 (ref 0.9–1.1)
INR PPP: 3 (ref 0.9–1.1)
KETONES UR STRIP.AUTO-MCNC: ABNORMAL MG/DL
LACTATE BLDA-SCNC: 3.9 MMOL/L (ref 0.4–2)
LACTATE BLDA-SCNC: 6.8 MMOL/L (ref 0.4–2)
LACTATE BLDA-SCNC: 7.3 MMOL/L (ref 0.4–2)
LDH SERPL L TO P-CCNC: ABNORMAL U/L (ref 84–246)
LDH SERPL L TO P-CCNC: ABNORMAL U/L (ref 84–246)
LEFT ATRIUM VOLUME AREA LENGTH INDEX BSA: 10.1 ML/M2
LEFT VENTRICLE INTERNAL DIMENSION DIASTOLE: 3.7 CM (ref 3.5–6)
LEFT VENTRICLE INTERNAL DIMENSION DIASTOLE: 4.15 CM (ref 3.5–6)
LEFT VENTRICULAR OUTFLOW TRACT DIAMETER: 2 CM
LEFT VENTRICULAR OUTFLOW TRACT DIAMETER: 2.2 CM
LEUKOCYTE ESTERASE UR QL STRIP.AUTO: ABNORMAL
LYMPHOCYTES # BLD AUTO: 0.31 X10*3/UL (ref 1.2–4.8)
LYMPHOCYTES # BLD MANUAL: 0.5 X10*3/UL (ref 1.2–4.8)
LYMPHOCYTES # BLD MANUAL: 0.78 X10*3/UL (ref 1.2–4.8)
LYMPHOCYTES NFR BLD AUTO: 1.8 %
LYMPHOCYTES NFR BLD MANUAL: 3 %
LYMPHOCYTES NFR BLD MANUAL: 4.7 %
MAGNESIUM SERPL-MCNC: 1.95 MG/DL (ref 1.6–2.4)
MAGNESIUM SERPL-MCNC: 2.25 MG/DL (ref 1.6–2.4)
MCH RBC QN AUTO: 29.3 PG (ref 26–34)
MCH RBC QN AUTO: 29.7 PG (ref 26–34)
MCH RBC QN AUTO: 30 PG (ref 26–34)
MCH RBC QN AUTO: 30.3 PG (ref 26–34)
MCHC RBC AUTO-ENTMCNC: 35.8 G/DL (ref 32–36)
MCHC RBC AUTO-ENTMCNC: 36.2 G/DL (ref 32–36)
MCHC RBC AUTO-ENTMCNC: 36.6 G/DL (ref 32–36)
MCHC RBC AUTO-ENTMCNC: 38.2 G/DL (ref 32–36)
MCV RBC AUTO: 79 FL (ref 80–100)
MCV RBC AUTO: 80 FL (ref 80–100)
MCV RBC AUTO: 82 FL (ref 80–100)
MCV RBC AUTO: 84 FL (ref 80–100)
METAMYELOCYTES # BLD MANUAL: 1.34 X10*3/UL
METAMYELOCYTES NFR BLD MANUAL: 8 %
MITRAL VALVE E/A RATIO: 1.02
MITRAL VALVE E/A RATIO: 1.54
MONOCYTES # BLD AUTO: 1.13 X10*3/UL (ref 0.1–1)
MONOCYTES # BLD MANUAL: 0.78 X10*3/UL (ref 0.1–1)
MONOCYTES # BLD MANUAL: 1 X10*3/UL (ref 0.1–1)
MONOCYTES NFR BLD AUTO: 6.7 %
MONOCYTES NFR BLD MANUAL: 4.7 %
MONOCYTES NFR BLD MANUAL: 6 %
MUCOUS THREADS #/AREA URNS AUTO: ABNORMAL /LPF
MYELOCYTES # BLD MANUAL: 1 X10*3/UL
MYELOCYTES NFR BLD MANUAL: 6 %
NEUTROPHILS # BLD AUTO: 15.03 X10*3/UL (ref 1.2–7.7)
NEUTROPHILS # BLD MANUAL: 12.86 X10*3/UL (ref 1.2–7.7)
NEUTROPHILS # BLD MANUAL: 14.95 X10*3/UL (ref 1.2–7.7)
NEUTROPHILS NFR BLD AUTO: 89.8 %
NEUTS BAND # BLD MANUAL: 6.96 X10*3/UL (ref 0–0.7)
NEUTS BAND # BLD MANUAL: 7.85 X10*3/UL (ref 0–0.7)
NEUTS BAND NFR BLD MANUAL: 42.2 %
NEUTS BAND NFR BLD MANUAL: 47 %
NEUTS SEG # BLD MANUAL: 5.01 X10*3/UL (ref 1.2–7)
NEUTS SEG # BLD MANUAL: 7.99 X10*3/UL (ref 1.2–7)
NEUTS SEG NFR BLD MANUAL: 30 %
NEUTS SEG NFR BLD MANUAL: 48.4 %
NITRITE UR QL STRIP.AUTO: NEGATIVE
NRBC BLD-RTO: 299.1 /100 WBCS (ref 0–0)
NRBC BLD-RTO: 372 /100 WBCS (ref 0–0)
NRBC BLD-RTO: 378.3 /100 WBCS (ref 0–0)
NRBC BLD-RTO: 405.5 /100 WBCS (ref 0–0)
OXYHGB MFR BLDA: 92.4 % (ref 94–98)
OXYHGB MFR BLDA: 94.3 % (ref 94–98)
OXYHGB MFR BLDA: 95.7 % (ref 94–98)
PAPPENHEIMER BOD BLD QL SMEAR: PRESENT
PAPPENHEIMER BOD BLD QL SMEAR: PRESENT
PATH REVIEW-CBC DIFFERENTIAL: NORMAL
PCO2 BLDA: 28 MM HG (ref 38–42)
PCO2 BLDA: 32 MM HG (ref 38–42)
PCO2 BLDA: 42 MM HG (ref 38–42)
PH BLDA: 7.25 PH (ref 7.38–7.42)
PH BLDA: 7.36 PH (ref 7.38–7.42)
PH BLDA: 7.44 PH (ref 7.38–7.42)
PH UR STRIP.AUTO: 5.5 [PH]
PHOSPHATE SERPL-MCNC: 3.6 MG/DL (ref 2.5–4.9)
PHOSPHATE SERPL-MCNC: 4.7 MG/DL (ref 2.5–4.9)
PHOSPHATE SERPL-MCNC: 4.9 MG/DL (ref 2.5–4.9)
PHOSPHATE SERPL-MCNC: 5.3 MG/DL (ref 2.5–4.9)
PHOSPHATE SERPL-MCNC: 6.7 MG/DL (ref 2.5–4.9)
PLATELET # BLD AUTO: 108 X10*3/UL (ref 150–450)
PLATELET # BLD AUTO: 123 X10*3/UL (ref 150–450)
PLATELET # BLD AUTO: 58 X10*3/UL (ref 150–450)
PLATELET # BLD AUTO: 83 X10*3/UL (ref 150–450)
PO2 BLDA: 100 MM HG (ref 85–95)
PO2 BLDA: 109 MM HG (ref 85–95)
PO2 BLDA: 138 MM HG (ref 85–95)
POLYCHROMASIA BLD QL SMEAR: ABNORMAL
POLYCHROMASIA BLD QL SMEAR: ABNORMAL
POLYCHROMASIA BLD QL SMEAR: NORMAL
POTASSIUM BLDA-SCNC: 5.3 MMOL/L (ref 3.5–5.3)
POTASSIUM BLDA-SCNC: 5.7 MMOL/L (ref 3.5–5.3)
POTASSIUM BLDA-SCNC: 5.8 MMOL/L (ref 3.5–5.3)
POTASSIUM SERPL-SCNC: 4.6 MMOL/L (ref 3.5–5.3)
POTASSIUM SERPL-SCNC: 4.9 MMOL/L (ref 3.5–5.3)
POTASSIUM SERPL-SCNC: 5 MMOL/L (ref 3.5–5.3)
POTASSIUM SERPL-SCNC: 5.6 MMOL/L (ref 3.5–5.3)
POTASSIUM SERPL-SCNC: 5.6 MMOL/L (ref 3.5–5.3)
POTASSIUM UR-SCNC: 66 MMOL/L
POTASSIUM/CREAT UR-RTO: 73 MMOL/G CREAT
PROCALCITONIN SERPL-MCNC: 7.09 NG/ML
PRODUCT BLOOD TYPE: 2800
PRODUCT BLOOD TYPE: 5100
PRODUCT BLOOD TYPE: 9500
PRODUCT BLOOD TYPE: 9500
PRODUCT CODE: NORMAL
PROT SERPL-MCNC: 4.6 G/DL (ref 6.4–8.2)
PROT SERPL-MCNC: 4.9 G/DL (ref 6.4–8.2)
PROT UR STRIP.AUTO-MCNC: ABNORMAL MG/DL
PROTHROMBIN TIME: 32.8 SECONDS (ref 9.8–12.8)
PROTHROMBIN TIME: 33.3 SECONDS (ref 9.8–12.8)
PROTHROMBIN TIME: 33.8 SECONDS (ref 9.8–12.8)
RBC # BLD AUTO: 1.98 X10*6/UL (ref 4–5.2)
RBC # BLD AUTO: 3.16 X10*6/UL (ref 4–5.2)
RBC # BLD AUTO: 3.27 X10*6/UL (ref 4–5.2)
RBC # BLD AUTO: 3.68 X10*6/UL (ref 4–5.2)
RBC # UR STRIP.AUTO: ABNORMAL /UL
RBC #/AREA URNS AUTO: ABNORMAL /HPF
RBC MORPH BLD: ABNORMAL
RBC MORPH BLD: ABNORMAL
RBC MORPH BLD: NORMAL
RETICS #: 0.12 X10*6/UL (ref 0.02–0.08)
RETICS #: ABNORMAL 10*3/UL
RETICS/RBC NFR AUTO: 33 % (ref 0.5–2)
RETICS/RBC NFR AUTO: 5.2 % (ref 0.5–2)
RIGHT VENTRICLE FREE WALL PEAK S': 9.36 CM/S
SALICYLATES SERPL-MCNC: 3 MG/DL
SALICYLATES SERPL-MCNC: <3 MG/DL
SAO2 % BLDA: 96 % (ref 94–100)
SAO2 % BLDA: 99 % (ref 94–100)
SAO2 % BLDA: 99 % (ref 94–100)
SCHISTOCYTES BLD QL SMEAR: ABNORMAL
SCHISTOCYTES BLD QL SMEAR: ABNORMAL
SCHISTOCYTES BLD QL SMEAR: NORMAL
SICKLE CELLS BLD QL SMEAR: ABNORMAL
SODIUM BLDA-SCNC: 127 MMOL/L (ref 136–145)
SODIUM BLDA-SCNC: 128 MMOL/L (ref 136–145)
SODIUM BLDA-SCNC: 128 MMOL/L (ref 136–145)
SODIUM SERPL-SCNC: 131 MMOL/L (ref 136–145)
SODIUM SERPL-SCNC: 131 MMOL/L (ref 136–145)
SODIUM SERPL-SCNC: 132 MMOL/L (ref 136–145)
SODIUM SERPL-SCNC: 132 MMOL/L (ref 136–145)
SODIUM SERPL-SCNC: 134 MMOL/L (ref 136–145)
SODIUM UR-SCNC: 42 MMOL/L
SODIUM/CREAT UR-RTO: 47 MMOL/G CREAT
SP GR UR STRIP.AUTO: 1.01
SQUAMOUS #/AREA URNS AUTO: ABNORMAL /HPF
TARGETS BLD QL SMEAR: ABNORMAL
TARGETS BLD QL SMEAR: ABNORMAL
TARGETS BLD QL SMEAR: NORMAL
TOTAL CELLS COUNTED BLD: 100
TOTAL CELLS COUNTED BLD: 64
TRICUSPID ANNULAR PLANE SYSTOLIC EXCURSION: 1.3 CM
TRICUSPID ANNULAR PLANE SYSTOLIC EXCURSION: 1.9 CM
UNIT ABO: NORMAL
UNIT NUMBER: NORMAL
UNIT RH: NORMAL
UNIT VOLUME: 283
UNIT VOLUME: 287
UNIT VOLUME: 295
UNIT VOLUME: 350
UROBILINOGEN UR STRIP.AUTO-MCNC: ABNORMAL MG/DL
WBC # BLD AUTO: 10.1 X10*3/UL (ref 4.4–11.3)
WBC # BLD AUTO: 16.5 X10*3/UL (ref 4.4–11.3)
WBC # BLD AUTO: 16.7 X10*3/UL (ref 4.4–11.3)
WBC # BLD AUTO: 16.8 X10*3/UL (ref 4.4–11.3)
WBC #/AREA URNS AUTO: >50 /HPF
XM INTEP: NORMAL

## 2024-09-11 PROCEDURE — 2500000001 HC RX 250 WO HCPCS SELF ADMINISTERED DRUGS (ALT 637 FOR MEDICARE OP)

## 2024-09-11 PROCEDURE — 80179 DRUG ASSAY SALICYLATE: CPT

## 2024-09-11 PROCEDURE — 2500000004 HC RX 250 GENERAL PHARMACY W/ HCPCS (ALT 636 FOR OP/ED)

## 2024-09-11 PROCEDURE — 36556 INSERT NON-TUNNEL CV CATH: CPT

## 2024-09-11 PROCEDURE — 36512 APHERESIS RBC: CPT | Performed by: PATHOLOGY

## 2024-09-11 PROCEDURE — 84075 ASSAY ALKALINE PHOSPHATASE: CPT

## 2024-09-11 PROCEDURE — 36556 INSERT NON-TUNNEL CV CATH: CPT | Mod: GC

## 2024-09-11 PROCEDURE — 86645 CMV ANTIBODY IGM: CPT

## 2024-09-11 PROCEDURE — 93005 ELECTROCARDIOGRAM TRACING: CPT

## 2024-09-11 PROCEDURE — 3E043XZ INTRODUCTION OF VASOPRESSOR INTO CENTRAL VEIN, PERCUTANEOUS APPROACH: ICD-10-PCS | Performed by: STUDENT IN AN ORGANIZED HEALTH CARE EDUCATION/TRAINING PROGRAM

## 2024-09-11 PROCEDURE — 93325 DOPPLER ECHO COLOR FLOW MAPG: CPT | Performed by: INTERNAL MEDICINE

## 2024-09-11 PROCEDURE — 93976 VASCULAR STUDY: CPT | Mod: DISTINCT PROCEDURAL SERVICE | Performed by: RADIOLOGY

## 2024-09-11 PROCEDURE — 0BH17EZ INSERTION OF ENDOTRACHEAL AIRWAY INTO TRACHEA, VIA NATURAL OR ARTIFICIAL OPENING: ICD-10-PCS | Performed by: STUDENT IN AN ORGANIZED HEALTH CARE EDUCATION/TRAINING PROGRAM

## 2024-09-11 PROCEDURE — 76700 US EXAM ABDOM COMPLETE: CPT | Mod: 59

## 2024-09-11 PROCEDURE — 82248 BILIRUBIN DIRECT: CPT

## 2024-09-11 PROCEDURE — 83021 HEMOGLOBIN CHROMOTOGRAPHY: CPT

## 2024-09-11 PROCEDURE — 31500 INSERT EMERGENCY AIRWAY: CPT | Performed by: INTERNAL MEDICINE

## 2024-09-11 PROCEDURE — 85027 COMPLETE CBC AUTOMATED: CPT

## 2024-09-11 PROCEDURE — 36620 INSERTION CATHETER ARTERY: CPT | Performed by: INTERNAL MEDICINE

## 2024-09-11 PROCEDURE — 83880 ASSAY OF NATRIURETIC PEPTIDE: CPT

## 2024-09-11 PROCEDURE — 85007 BL SMEAR W/DIFF WBC COUNT: CPT

## 2024-09-11 PROCEDURE — 2020000001 HC ICU ROOM DAILY

## 2024-09-11 PROCEDURE — 82330 ASSAY OF CALCIUM: CPT

## 2024-09-11 PROCEDURE — 36556 INSERT NON-TUNNEL CV CATH: CPT | Mod: GC | Performed by: INTERNAL MEDICINE

## 2024-09-11 PROCEDURE — 86644 CMV ANTIBODY: CPT

## 2024-09-11 PROCEDURE — 87070 CULTURE OTHR SPECIMN AEROBIC: CPT

## 2024-09-11 PROCEDURE — 87081 CULTURE SCREEN ONLY: CPT

## 2024-09-11 PROCEDURE — 80053 COMPREHEN METABOLIC PANEL: CPT

## 2024-09-11 PROCEDURE — P9040 RBC LEUKOREDUCED IRRADIATED: HCPCS

## 2024-09-11 PROCEDURE — 93308 TTE F-UP OR LMTD: CPT

## 2024-09-11 PROCEDURE — 82306 VITAMIN D 25 HYDROXY: CPT

## 2024-09-11 PROCEDURE — 84132 ASSAY OF SERUM POTASSIUM: CPT

## 2024-09-11 PROCEDURE — 86880 COOMBS TEST DIRECT: CPT

## 2024-09-11 PROCEDURE — 37799 UNLISTED PX VASCULAR SURGERY: CPT

## 2024-09-11 PROCEDURE — 93306 TTE W/DOPPLER COMPLETE: CPT

## 2024-09-11 PROCEDURE — 84100 ASSAY OF PHOSPHORUS: CPT

## 2024-09-11 PROCEDURE — 82435 ASSAY OF BLOOD CHLORIDE: CPT

## 2024-09-11 PROCEDURE — 86008 ALLG SPEC IGE RECOMB EA: CPT

## 2024-09-11 PROCEDURE — 85018 HEMOGLOBIN: CPT

## 2024-09-11 PROCEDURE — 84145 PROCALCITONIN (PCT): CPT

## 2024-09-11 PROCEDURE — 84450 TRANSFERASE (AST) (SGOT): CPT

## 2024-09-11 PROCEDURE — 83735 ASSAY OF MAGNESIUM: CPT

## 2024-09-11 PROCEDURE — 36620 INSERTION CATHETER ARTERY: CPT | Mod: GC | Performed by: INTERNAL MEDICINE

## 2024-09-11 PROCEDURE — 83020 HEMOGLOBIN ELECTROPHORESIS: CPT | Performed by: PATHOLOGY

## 2024-09-11 PROCEDURE — 99223 1ST HOSP IP/OBS HIGH 75: CPT | Performed by: STUDENT IN AN ORGANIZED HEALTH CARE EDUCATION/TRAINING PROGRAM

## 2024-09-11 PROCEDURE — 74177 CT ABD & PELVIS W/CONTRAST: CPT | Performed by: RADIOLOGY

## 2024-09-11 PROCEDURE — 83615 LACTATE (LD) (LDH) ENZYME: CPT

## 2024-09-11 PROCEDURE — 31500 INSERT EMERGENCY AIRWAY: CPT | Mod: GC | Performed by: INTERNAL MEDICINE

## 2024-09-11 PROCEDURE — 02HV33Z INSERTION OF INFUSION DEVICE INTO SUPERIOR VENA CAVA, PERCUTANEOUS APPROACH: ICD-10-PCS | Performed by: STUDENT IN AN ORGANIZED HEALTH CARE EDUCATION/TRAINING PROGRAM

## 2024-09-11 PROCEDURE — 71045 X-RAY EXAM CHEST 1 VIEW: CPT

## 2024-09-11 PROCEDURE — 87205 SMEAR GRAM STAIN: CPT

## 2024-09-11 PROCEDURE — 80320 DRUG SCREEN QUANTALCOHOLS: CPT

## 2024-09-11 PROCEDURE — 94003 VENT MGMT INPAT SUBQ DAY: CPT

## 2024-09-11 PROCEDURE — 71045 X-RAY EXAM CHEST 1 VIEW: CPT | Performed by: RADIOLOGY

## 2024-09-11 PROCEDURE — 2500000005 HC RX 250 GENERAL PHARMACY W/O HCPCS: Performed by: INTERNAL MEDICINE

## 2024-09-11 PROCEDURE — 82550 ASSAY OF CK (CPK): CPT | Performed by: STUDENT IN AN ORGANIZED HEALTH CARE EDUCATION/TRAINING PROGRAM

## 2024-09-11 PROCEDURE — 99291 CRITICAL CARE FIRST HOUR: CPT

## 2024-09-11 PROCEDURE — 80202 ASSAY OF VANCOMYCIN: CPT | Performed by: STUDENT IN AN ORGANIZED HEALTH CARE EDUCATION/TRAINING PROGRAM

## 2024-09-11 PROCEDURE — 99223 1ST HOSP IP/OBS HIGH 75: CPT | Performed by: INTERNAL MEDICINE

## 2024-09-11 PROCEDURE — P9016 RBC LEUKOCYTES REDUCED: HCPCS

## 2024-09-11 PROCEDURE — 86663 EPSTEIN-BARR ANTIBODY: CPT

## 2024-09-11 PROCEDURE — 99221 1ST HOSP IP/OBS SF/LOW 40: CPT | Performed by: PATHOLOGY

## 2024-09-11 PROCEDURE — 71260 CT THORAX DX C+: CPT | Performed by: RADIOLOGY

## 2024-09-11 PROCEDURE — 93010 ELECTROCARDIOGRAM REPORT: CPT | Performed by: INTERNAL MEDICINE

## 2024-09-11 PROCEDURE — 2500000005 HC RX 250 GENERAL PHARMACY W/O HCPCS

## 2024-09-11 PROCEDURE — 36512 APHERESIS RBC: CPT

## 2024-09-11 PROCEDURE — 94799 UNLISTED PULMONARY SVC/PX: CPT

## 2024-09-11 PROCEDURE — 93321 DOPPLER ECHO F-UP/LMTD STD: CPT | Performed by: INTERNAL MEDICINE

## 2024-09-11 PROCEDURE — 2500000004 HC RX 250 GENERAL PHARMACY W/ HCPCS (ALT 636 FOR OP/ED): Performed by: STUDENT IN AN ORGANIZED HEALTH CARE EDUCATION/TRAINING PROGRAM

## 2024-09-11 PROCEDURE — 85610 PROTHROMBIN TIME: CPT

## 2024-09-11 PROCEDURE — 80069 RENAL FUNCTION PANEL: CPT | Mod: CCI

## 2024-09-11 PROCEDURE — 86902 BLOOD TYPE ANTIGEN DONOR EA: CPT

## 2024-09-11 PROCEDURE — 93975 VASCULAR STUDY: CPT

## 2024-09-11 PROCEDURE — 36415 COLL VENOUS BLD VENIPUNCTURE: CPT

## 2024-09-11 PROCEDURE — 36556 INSERT NON-TUNNEL CV CATH: CPT | Performed by: INTERNAL MEDICINE

## 2024-09-11 PROCEDURE — 93308 TTE F-UP OR LMTD: CPT | Performed by: INTERNAL MEDICINE

## 2024-09-11 PROCEDURE — 36430 TRANSFUSION BLD/BLD COMPNT: CPT

## 2024-09-11 PROCEDURE — 2500000002 HC RX 250 W HCPCS SELF ADMINISTERED DRUGS (ALT 637 FOR MEDICARE OP, ALT 636 FOR OP/ED)

## 2024-09-11 PROCEDURE — 5A1955Z RESPIRATORY VENTILATION, GREATER THAN 96 CONSECUTIVE HOURS: ICD-10-PCS | Performed by: STUDENT IN AN ORGANIZED HEALTH CARE EDUCATION/TRAINING PROGRAM

## 2024-09-11 PROCEDURE — 86664 EPSTEIN-BARR NUCLEAR ANTIGEN: CPT

## 2024-09-11 PROCEDURE — 70450 CT HEAD/BRAIN W/O DYE: CPT | Performed by: RADIOLOGY

## 2024-09-11 PROCEDURE — 93306 TTE W/DOPPLER COMPLETE: CPT | Performed by: INTERNAL MEDICINE

## 2024-09-11 PROCEDURE — 2550000001 HC RX 255 CONTRASTS: Performed by: STUDENT IN AN ORGANIZED HEALTH CARE EDUCATION/TRAINING PROGRAM

## 2024-09-11 PROCEDURE — 86665 EPSTEIN-BARR CAPSID VCA: CPT

## 2024-09-11 PROCEDURE — 84484 ASSAY OF TROPONIN QUANT: CPT

## 2024-09-11 PROCEDURE — 80143 DRUG ASSAY ACETAMINOPHEN: CPT

## 2024-09-11 PROCEDURE — P9017 PLASMA 1 DONOR FRZ W/IN 8 HR: HCPCS

## 2024-09-11 PROCEDURE — 82947 ASSAY GLUCOSE BLOOD QUANT: CPT

## 2024-09-11 PROCEDURE — 85045 AUTOMATED RETICULOCYTE COUNT: CPT

## 2024-09-11 PROCEDURE — 76705 ECHO EXAM OF ABDOMEN: CPT | Mod: DISTINCT PROCEDURAL SERVICE | Performed by: RADIOLOGY

## 2024-09-11 PROCEDURE — 2500000004 HC RX 250 GENERAL PHARMACY W/ HCPCS (ALT 636 FOR OP/ED): Mod: JZ

## 2024-09-11 RX ORDER — ALBUTEROL SULFATE 0.83 MG/ML
2.5 SOLUTION RESPIRATORY (INHALATION) EVERY 6 HOURS PRN
Status: ACTIVE | OUTPATIENT
Start: 2024-09-11

## 2024-09-11 RX ORDER — ACETAMINOPHEN 325 MG/1
650 TABLET ORAL ONCE
Status: COMPLETED | OUTPATIENT
Start: 2024-09-11 | End: 2024-09-11

## 2024-09-11 RX ORDER — DIPHENHYDRAMINE HYDROCHLORIDE 50 MG/ML
25 INJECTION INTRAMUSCULAR; INTRAVENOUS EVERY 5 MIN PRN
Status: DISCONTINUED | OUTPATIENT
Start: 2024-09-11 | End: 2024-09-11 | Stop reason: HOSPADM

## 2024-09-11 RX ORDER — DIPHENHYDRAMINE HCL 25 MG
25 CAPSULE ORAL ONCE
Status: COMPLETED | OUTPATIENT
Start: 2024-09-11 | End: 2024-09-11

## 2024-09-11 RX ORDER — BENZONATATE 100 MG/1
100 CAPSULE ORAL 2 TIMES DAILY PRN
Status: ON HOLD | COMMUNITY

## 2024-09-11 RX ORDER — CALCIUM CARBONATE 200(500)MG
1500 TABLET,CHEWABLE ORAL EVERY 5 MIN PRN
Status: DISCONTINUED | OUTPATIENT
Start: 2024-09-11 | End: 2024-09-11 | Stop reason: HOSPADM

## 2024-09-11 RX ORDER — POLYETHYLENE GLYCOL 3350 17 G/17G
17 POWDER, FOR SOLUTION ORAL DAILY
Status: DISCONTINUED | OUTPATIENT
Start: 2024-09-11 | End: 2024-09-12

## 2024-09-11 RX ORDER — DEXTROSE MONOHYDRATE 50 MG/ML
50 INJECTION, SOLUTION INTRAVENOUS CONTINUOUS
Status: DISCONTINUED | OUTPATIENT
Start: 2024-09-11 | End: 2024-09-11

## 2024-09-11 RX ORDER — THIAMINE HYDROCHLORIDE 100 MG/ML
100 INJECTION, SOLUTION INTRAMUSCULAR; INTRAVENOUS DAILY
Status: DISPENSED | OUTPATIENT
Start: 2024-09-14

## 2024-09-11 RX ORDER — SODIUM CHLORIDE 0.9 % (FLUSH) 0.9 %
10 SYRINGE (ML) INJECTION ONCE
Status: DISCONTINUED | OUTPATIENT
Start: 2024-09-11 | End: 2024-09-11 | Stop reason: HOSPADM

## 2024-09-11 RX ORDER — CALCIUM GLUCONATE 20 MG/ML
2 INJECTION, SOLUTION INTRAVENOUS ONCE
Status: COMPLETED | OUTPATIENT
Start: 2024-09-11 | End: 2024-09-11

## 2024-09-11 RX ORDER — CALCIUM GLUCONATE 98 MG/ML
1 INJECTION, SOLUTION INTRAVENOUS ONCE
Status: COMPLETED | OUTPATIENT
Start: 2024-09-11 | End: 2024-09-10

## 2024-09-11 RX ORDER — SENNOSIDES 8.6 MG/1
1 TABLET ORAL 2 TIMES DAILY
Status: DISCONTINUED | OUTPATIENT
Start: 2024-09-11 | End: 2024-09-12

## 2024-09-11 RX ORDER — FOLIC ACID 1 MG/1
1 TABLET ORAL DAILY
Status: DISPENSED | OUTPATIENT
Start: 2024-09-11

## 2024-09-11 RX ORDER — CALCIUM GLUCONATE 20 MG/ML
2 INJECTION, SOLUTION INTRAVENOUS ONCE
Status: DISCONTINUED | OUTPATIENT
Start: 2024-09-11 | End: 2024-09-11

## 2024-09-11 RX ORDER — HEPARIN SODIUM 1000 [USP'U]/ML
1200 INJECTION, SOLUTION INTRAVENOUS; SUBCUTANEOUS ONCE
Status: COMPLETED | OUTPATIENT
Start: 2024-09-11 | End: 2024-09-11

## 2024-09-11 RX ORDER — PANTOPRAZOLE SODIUM 40 MG/10ML
40 INJECTION, POWDER, LYOPHILIZED, FOR SOLUTION INTRAVENOUS DAILY
Status: DISPENSED | OUTPATIENT
Start: 2024-09-11

## 2024-09-11 SDOH — SOCIAL STABILITY: SOCIAL INSECURITY
WITHIN THE LAST YEAR, HAVE TO BEEN RAPED OR FORCED TO HAVE ANY KIND OF SEXUAL ACTIVITY BY YOUR PARTNER OR EX-PARTNER?: NO

## 2024-09-11 SDOH — ECONOMIC STABILITY: INCOME INSECURITY: IN THE PAST 12 MONTHS, HAS THE ELECTRIC, GAS, OIL, OR WATER COMPANY THREATENED TO SHUT OFF SERVICE IN YOUR HOME?: NO

## 2024-09-11 SDOH — SOCIAL STABILITY: SOCIAL INSECURITY
WITHIN THE LAST YEAR, HAVE YOU BEEN RAPED OR FORCED TO HAVE ANY KIND OF SEXUAL ACTIVITY BY YOUR PARTNER OR EX-PARTNER?: NO

## 2024-09-11 SDOH — ECONOMIC STABILITY: FOOD INSECURITY: WITHIN THE PAST 12 MONTHS, THE FOOD YOU BOUGHT JUST DIDN'T LAST AND YOU DIDN'T HAVE MONEY TO GET MORE.: NEVER TRUE

## 2024-09-11 SDOH — SOCIAL STABILITY: SOCIAL INSECURITY: WITHIN THE LAST YEAR, HAVE YOU BEEN AFRAID OF YOUR PARTNER OR EX-PARTNER?: NO

## 2024-09-11 SDOH — ECONOMIC STABILITY: HOUSING INSECURITY: IN THE PAST 12 MONTHS, HOW MANY TIMES HAVE YOU MOVED WHERE YOU WERE LIVING?: 0

## 2024-09-11 SDOH — SOCIAL STABILITY: SOCIAL INSECURITY: HAVE YOU HAD THOUGHTS OF HARMING ANYONE ELSE?: UNABLE TO ASSESS

## 2024-09-11 SDOH — SOCIAL STABILITY: SOCIAL INSECURITY: WITHIN THE LAST YEAR, HAVE YOU BEEN HUMILIATED OR EMOTIONALLY ABUSED IN OTHER WAYS BY YOUR PARTNER OR EX-PARTNER?: NO

## 2024-09-11 SDOH — SOCIAL STABILITY: SOCIAL INSECURITY
WITHIN THE LAST YEAR, HAVE YOU BEEN KICKED, HIT, SLAPPED, OR OTHERWISE PHYSICALLY HURT BY YOUR PARTNER OR EX-PARTNER?: NO

## 2024-09-11 SDOH — ECONOMIC STABILITY: HOUSING INSECURITY: AT ANY TIME IN THE PAST 12 MONTHS, WERE YOU HOMELESS OR LIVING IN A SHELTER (INCLUDING NOW)?: NO

## 2024-09-11 SDOH — SOCIAL STABILITY: SOCIAL INSECURITY: DOES ANYONE TRY TO KEEP YOU FROM HAVING/CONTACTING OTHER FRIENDS OR DOING THINGS OUTSIDE YOUR HOME?: UNABLE TO ASSESS

## 2024-09-11 SDOH — ECONOMIC STABILITY: INCOME INSECURITY: IN THE PAST 12 MONTHS HAS THE ELECTRIC, GAS, OIL, OR WATER COMPANY THREATENED TO SHUT OFF SERVICES IN YOUR HOME?: NO

## 2024-09-11 SDOH — ECONOMIC STABILITY: INCOME INSECURITY: IN THE LAST 12 MONTHS, WAS THERE A TIME WHEN YOU WERE NOT ABLE TO PAY THE MORTGAGE OR RENT ON TIME?: NO

## 2024-09-11 SDOH — SOCIAL STABILITY: SOCIAL INSECURITY: HAVE YOU HAD ANY THOUGHTS OF HARMING ANYONE ELSE?: UNABLE TO ASSESS

## 2024-09-11 SDOH — ECONOMIC STABILITY: FOOD INSECURITY: WITHIN THE PAST 12 MONTHS, YOU WORRIED THAT YOUR FOOD WOULD RUN OUT BEFORE YOU GOT THE MONEY TO BUY MORE.: NEVER TRUE

## 2024-09-11 SDOH — ECONOMIC STABILITY: INCOME INSECURITY: HOW HARD IS IT FOR YOU TO PAY FOR THE VERY BASICS LIKE FOOD, HOUSING, MEDICAL CARE, AND HEATING?: NOT HARD AT ALL

## 2024-09-11 SDOH — ECONOMIC STABILITY: HOUSING INSECURITY: IN THE LAST 12 MONTHS, WAS THERE A TIME WHEN YOU WERE NOT ABLE TO PAY THE MORTGAGE OR RENT ON TIME?: NO

## 2024-09-11 SDOH — SOCIAL STABILITY: SOCIAL INSECURITY: DO YOU FEEL UNSAFE GOING BACK TO THE PLACE WHERE YOU ARE LIVING?: UNABLE TO ASSESS

## 2024-09-11 SDOH — ECONOMIC STABILITY: FOOD INSECURITY: HOW HARD IS IT FOR YOU TO PAY FOR THE VERY BASICS LIKE FOOD, HOUSING, MEDICAL CARE, AND HEATING?: NOT HARD AT ALL

## 2024-09-11 SDOH — SOCIAL STABILITY: SOCIAL INSECURITY: DO YOU FEEL ANYONE HAS EXPLOITED OR TAKEN ADVANTAGE OF YOU FINANCIALLY OR OF YOUR PERSONAL PROPERTY?: UNABLE TO ASSESS

## 2024-09-11 SDOH — ECONOMIC STABILITY: TRANSPORTATION INSECURITY: IN THE PAST 12 MONTHS, HAS LACK OF TRANSPORTATION KEPT YOU FROM MEDICAL APPOINTMENTS OR FROM GETTING MEDICATIONS?: NO

## 2024-09-11 SDOH — ECONOMIC STABILITY: FOOD INSECURITY: WITHIN THE PAST 12 MONTHS, YOU WORRIED THAT YOUR FOOD WOULD RUN OUT BEFORE YOU GOT MONEY TO BUY MORE.: NEVER TRUE

## 2024-09-11 SDOH — SOCIAL STABILITY: SOCIAL INSECURITY: HAS ANYONE EVER THREATENED TO HURT YOUR FAMILY OR YOUR PETS?: UNABLE TO ASSESS

## 2024-09-11 SDOH — SOCIAL STABILITY: SOCIAL INSECURITY: ARE YOU OR HAVE YOU BEEN THREATENED OR ABUSED PHYSICALLY, EMOTIONALLY, OR SEXUALLY BY ANYONE?: UNABLE TO ASSESS

## 2024-09-11 SDOH — SOCIAL STABILITY: SOCIAL INSECURITY: ARE THERE ANY APPARENT SIGNS OF INJURIES/BEHAVIORS THAT COULD BE RELATED TO ABUSE/NEGLECT?: UNABLE TO ASSESS

## 2024-09-11 SDOH — SOCIAL STABILITY: SOCIAL INSECURITY: ABUSE: ADULT

## 2024-09-11 SDOH — SOCIAL STABILITY: SOCIAL INSECURITY: WERE YOU ABLE TO COMPLETE ALL THE BEHAVIORAL HEALTH SCREENINGS?: NO

## 2024-09-11 ASSESSMENT — PAIN SCALES - GENERAL
PAINLEVEL_OUTOF10: 10 - WORST POSSIBLE PAIN
PAINLEVEL_OUTOF10: 0 - NO PAIN
PAINLEVEL_OUTOF10: 10 - WORST POSSIBLE PAIN
PAINLEVEL_OUTOF10: 0 - NO PAIN

## 2024-09-11 ASSESSMENT — PAIN - FUNCTIONAL ASSESSMENT
PAIN_FUNCTIONAL_ASSESSMENT: 0-10
PAIN_FUNCTIONAL_ASSESSMENT: 0-10
PAIN_FUNCTIONAL_ASSESSMENT: CPOT (CRITICAL CARE PAIN OBSERVATION TOOL)
PAIN_FUNCTIONAL_ASSESSMENT: 0-10
PAIN_FUNCTIONAL_ASSESSMENT: 0-10

## 2024-09-11 ASSESSMENT — ACTIVITIES OF DAILY LIVING (ADL)
ASSISTIVE_DEVICE: OTHER (COMMENT)
HEARING - LEFT EAR: UNABLE TO ASSESS
DRESSING YOURSELF: UNABLE TO ASSESS
PATIENT'S MEMORY ADEQUATE TO SAFELY COMPLETE DAILY ACTIVITIES?: UNABLE TO ASSESS
HEARING - RIGHT EAR: UNABLE TO ASSESS
LACK_OF_TRANSPORTATION: PATIENT UNABLE TO ANSWER
TOILETING: UNABLE TO ASSESS
JUDGMENT_ADEQUATE_SAFELY_COMPLETE_DAILY_ACTIVITIES: UNABLE TO ASSESS
FEEDING YOURSELF: UNABLE TO ASSESS
LACK_OF_TRANSPORTATION: PATIENT UNABLE TO ANSWER
GROOMING: UNABLE TO ASSESS
LACK_OF_TRANSPORTATION: NO
ADEQUATE_TO_COMPLETE_ADL: UNABLE TO ASSESS
BATHING: UNABLE TO ASSESS
LACK_OF_TRANSPORTATION: NO
WALKS IN HOME: UNABLE TO ASSESS

## 2024-09-11 ASSESSMENT — COGNITIVE AND FUNCTIONAL STATUS - GENERAL: PATIENT BASELINE BEDBOUND: UNABLE TO ASSESS AT THIS TIME

## 2024-09-11 ASSESSMENT — LIFESTYLE VARIABLES
HOW OFTEN DO YOU HAVE A DRINK CONTAINING ALCOHOL: PATIENT UNABLE TO ANSWER
HOW OFTEN DO YOU HAVE 6 OR MORE DRINKS ON ONE OCCASION: PATIENT UNABLE TO ANSWER
AUDIT-C TOTAL SCORE: -1
HOW MANY STANDARD DRINKS CONTAINING ALCOHOL DO YOU HAVE ON A TYPICAL DAY: PATIENT UNABLE TO ANSWER
AUDIT-C TOTAL SCORE: -1
SKIP TO QUESTIONS 9-10: 0

## 2024-09-11 ASSESSMENT — COLUMBIA-SUICIDE SEVERITY RATING SCALE - C-SSRS
6. HAVE YOU EVER DONE ANYTHING, STARTED TO DO ANYTHING, OR PREPARED TO DO ANYTHING TO END YOUR LIFE?: NO
1. IN THE PAST MONTH, HAVE YOU WISHED YOU WERE DEAD OR WISHED YOU COULD GO TO SLEEP AND NOT WAKE UP?: NO
2. HAVE YOU ACTUALLY HAD ANY THOUGHTS OF KILLING YOURSELF?: NO

## 2024-09-11 NOTE — CONSULTS
"Nutrition Initial Assessment:   Nutrition Assessment    Reason for Assessment: Admission nursing screening    Patient is a 54 y.o. female presenting with diffuse pain.  Treated for sickle cell crisis.  Course c/b low BP with need for pressors, CHARLES, liver injury, lethargy with a change in mental status and need for intubation.  Remains intubated this morning.  She was receiving a transfusion exchange at visit this morning.  She is on levo and vaso for pressure support and sedated with fentanyl and propofol.    Pt with an OGT in place for enteral access.  Current propofol rate is 13.2mls/hr which provides 348kcals daily from fat.  Team started her on TF of Nepro at 10mls/hr this morning as her blood sugars were low per her RN.  Unfortunately, a trickle TF will not adequately raise a pt's blood sugar-- currently, pt is only receiving 1.6grams carbs per hour or 38.4grams carbs per day.  Nepro is also a lower/moderate carb formula.      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), pHTN (6/2023), cauda equina with saddle numbness, hx SVT, fibromyalgia, Raynaud's disease, CKD II (baseline SCr~<2) and CAN        Anthropometrics:  Height: 165.1 cm (5' 5\")   Weight: 110 kg (242 lb 8.1 oz)   BMI (Calculated): 40.36  IBW/kg (Dietitian Calculated): 56.8 kg  Percent of IBW: 194 %     Weight History:     9/10/24: 110kg  8/12/24: 106kg  6/10/24: 110kg  4/10/24: 110kg  1/11/24: 110kg  10/2/23: 108kg    Weight Change %:       Nutrition Focused Physical Exam Findings:    Subcutaneous Fat Loss:   Orbital Fat Pads: Well nourished (slightly bulging fat pads)  Buccal Fat Pads: Well nourished (full, rounded cheeks)  Triceps: Well nourished (ample fat tissue)  Muscle Wasting:  Temporalis: Well nourished (well-defined muscle)  Pectoralis (Clavicular Region): Well nourished (clavicle not " visible)  Deltoid/Trapezius: Well nourished (rounded appearance at arm, shoulder, neck)  Quadriceps: Well nourished (well developed, well rounded)  Gastrocnemius: Well nourished (well developed bulbous muscle)  Edema:  Edema: none  Physical Findings:  Skin: Negative    Nutrition Significant Labs:  BMP Trend:   Results from last 7 days   Lab Units 09/11/24  0758 09/11/24  0504 09/11/24  0035 09/10/24  1912   GLUCOSE mg/dL 76 120* 254* 53*   CALCIUM mg/dL 8.5* 7.8* 7.4* 8.0*   SODIUM mmol/L 134* 132* 131* 134*   POTASSIUM mmol/L 4.6 5.0 5.6* 6.3*   CO2 mmol/L 22 19* 18* 17*   CHLORIDE mmol/L 97* 95* 97* 97*   BUN mg/dL 69* 66* 67* 64*   CREATININE mg/dL 3.97* 3.76* 3.55* 3.32*    , A1C:  Lab Results   Component Value Date    HGBA1C 6.9 02/18/2020   , BG POCT trend:   Results from last 7 days   Lab Units 09/11/24  1139 09/11/24  1013 09/11/24  1010 09/11/24  0852 09/11/24  0809   POCT GLUCOSE mg/dL 121* 98 <10* 95 47*    , Renal Lab Trend:   Results from last 7 days   Lab Units 09/11/24  0758 09/11/24  0504 09/11/24  0035 09/10/24  1912   POTASSIUM mmol/L 4.6 5.0 5.6* 6.3*   PHOSPHORUS mg/dL 4.9 5.3* 6.7* 7.4*   SODIUM mmol/L 134* 132* 131* 134*   MAGNESIUM mg/dL  --  2.25  --  2.57*   EGFR mL/min/1.73m*2 13* 14* 15* 16*   BUN mg/dL 69* 66* 67* 64*   CREATININE mg/dL 3.97* 3.76* 3.55* 3.32*    , Vit D:   Lab Results   Component Value Date    VITD25 10 (L) 05/08/2024        Nutrition Specific Medications:  Scheduled medications  fentaNYL, 25 mcg, intravenous, Once  folic acid, 1 mg, oral, Daily  heparin, 1,200 Units, intravenous, Once  heparin, 1,200 Units, intravenous, Once  pantoprazole, 40 mg, intravenous, Daily  perflutren protein A microsphere, 0.5 mL, intravenous, Once in imaging  perflutren protein A microsphere, 0.5 mL, intravenous, Once in imaging  piperacillin-tazobactam, 2.25 g, intravenous, q6h  polyethylene glycol, 17 g, oral, Daily  sennosides, 1 tablet, oral, BID  sodium chloride 0.9%, 10 mL,  intra-catheter, Once  sodium chloride 0.9%, 10 mL, intra-catheter, Once  sodium zirconium cyclosilicate, 10 g, oral, TID  sulfur hexafluoride microsphr, 2 mL, intravenous, Once in imaging  sulfur hexafluoride microsphr, 2 mL, intravenous, Once in imaging  [START ON 9/14/2024] thiamine, 100 mg, intravenous, Daily  thiamine, 500 mg, intravenous, TID      Continuous medications  calcium gluconate 2 g in 100 mL infusion, 25 mL/hr, Last Rate: 25 mL/hr (09/11/24 1002)  fentaNYL, 0-300 mcg/hr, Last Rate: 50 mcg/hr (09/11/24 0807)  norepinephrine, 0.01-1 mcg/kg/min, Last Rate: 0.1 mcg/kg/min (09/11/24 0908)  propofol, 5-50 mcg/kg/min, Last Rate: 20 mcg/kg/min (09/11/24 0801)  vasopressin, 0.03 Units/min, Last Rate: Stopped (09/11/24 0045)      PRN medications  PRN medications: albuterol, alteplase, calcium carbonate, dextrose, dextrose, diphenhydrAMINE, fentaNYL, glucagon, glucagon, oxygen, sodium chloride, vancomycin     I/O:   Last BM Date:  (unknown);          Dietary Orders (From admission, onward)       Start     Ordered    09/11/24 0908  Enteral feeding with NPO Nepro; 10 (trickle)  Diet effective now        Question Answer Comment   Tube feeding formula: Nepro    Tube feeding continuous rate (mL/hr): 10 trickle       09/11/24 0913                     Estimated Needs:   Total Energy Estimated Needs (kCal):  (5225-2528 while intubated)  Method for Estimating Needs: MV= 9.9, RMR= 1835  Total Protein Estimated Needs (g):  (85)  Method for Estimating Needs: 56.8kg x 1.5            Nutrition Diagnosis        Nutrition Diagnosis  Patient has Nutrition Diagnosis: Yes  Diagnosis Status (1): New  Nutrition Diagnosis 1: Increased nutrient needs  Related to (1): need for intubation; obesity  As Evidenced by (1): altered metabolism in the setting of critical illness with h/o obesity (BMI= 40.4)       Nutrition Interventions/Recommendations         Nutrition Prescription:   For tube feed: can continue with Nepro while serum  potassium was elevated.  Increase by 10mls q6hrs until goal rate of 30mls/hr reached.  This is goal while on Propofol.  Once off of Propofol, can increase to new goal rate of 35mls/hr.  Also order one packet of Pro-Stat AWC once daily.   Check new Vitamin D level as she was deficient in May 2024 and unclear if she was ever repleted.          Nutrition Interventions:    TF at goal with propofol= 1622kcals, 58grams protein  TF at goal without propofol= 1587kcals, 85grams protein       Nutrition Education:   Not appropriate       Nutrition Monitoring and Evaluation   Food/Nutrient Related History Monitoring  Monitoring and Evaluation Plan: Enteral and parenteral nutrition intake  Criteria: TF at goal to meet 100% estimated nutrition needs         Time Spent/Follow-up Reminder:   Time Spent (min): 60 minutes  Last Date of Nutrition Visit: 09/11/24  Nutrition Follow-Up Needed?: Dietitian to reassess per policy  Follow up Comment: GIGI cisse-- TF recs

## 2024-09-11 NOTE — PROCEDURES
Intubation Procedure Note    Indication: hypercarbia, acidosis    Consent: the patient's mother    Medications Used: etomidate and fentanyl rocuronium    Procedure: The patient was placed in the supine. Intubation was performed using a low pro S4 which showed a grade 3 view.  A size 7.5mm cuffed endotracheal tube endotracheal tube was passed. ET tube was secured.  Initial confirmation of placement included by auscultation, by CXR, and ETCO2 monitor.  A chest x-ray to verify correct placement of the tube .    The patient tolerated the procedure Patient tolerated the procedure well.     Attempts: 1 attempt    Complications:  Difficult airway with large tongue and increased soft tissue surrounding hypopharynx. Initial view of vocal cords that was occluded by either tissue or mucous.

## 2024-09-11 NOTE — PROGRESS NOTES
Pharmacy Medication History Review    Senait Narvaez is a 54 y.o. female admitted for Sickle cell crisis (Multi). Pharmacy reviewed the patient's [id number] medications and allergies for accuracy.    Medications ADDED:  Benzonatate 100 mg capsule  Medications CHANGED:  None  Medications REMOVED:   Elderberry fruit 350 mg  Loratadine 10 mg  Multivitamin    The list below reflects the updated PTA list.   Prior to Admission Medications   Prescriptions Last Dose Informant Patient Reported? Taking?   albuterol 90 mcg/actuation inhaler Not Taking Self, Mother No No   Sig: Inhale 2 puffs every 6 hours if needed for wheezing.   Patient not taking: Reported on 9/11/2024   ascorbic acid (Vitamin C) 500 mg chewable tablet  Self, Mother Yes No   Sig: Chew 1 tablet (500 mg) once daily. As needed   benzonatate (Tessalon) 100 mg capsule  Mother Yes No   Sig: Take 1 capsule (100 mg) by mouth 2 times a day as needed for cough. Do not crush or chew.   cyclobenzaprine (Flexeril) 10 mg tablet  Mother No No   Sig: Take 1 tablet (10 mg) by mouth 3 times a day as needed for muscle spasms.   fluticasone (Flonase) 50 mcg/actuation nasal spray  Self, Mother Yes No   Sig: Administer 2 sprays into each nostril if needed.   folic acid (Folvite) 1 mg tablet  Mother No No   Sig: Take 1 tablet (1 mg) by mouth once daily.   furosemide (Lasix) 20 mg tablet  Mother No No   Sig: Take 1 tablet (20 mg) by mouth every other day.   metoprolol tartrate (Lopressor) 25 mg tablet  Mother No No   Sig: Take 0.5 tablets (12.5 mg) by mouth 2 times a day.   naloxone (Narcan) 4 mg/0.1 mL nasal spray  Self, Mother No No   Sig: Administer 1 spray (4 mg) into affected nostril(s) if needed for opioid reversal. May repeat every 2-3 minutes if needed, alternating nostrils, until medical assistance becomes available.   ondansetron (Zofran) 4 mg tablet  Self, Mother No No   Sig: Take 1 tablet (4 mg) by mouth every 8 hours if needed for nausea or vomiting.   oxyCODONE  "(Roxicodone) 10 mg immediate release tablet  Mother No No   Sig: Take 1 tablet (10 mg) by mouth every 4 hours if needed for severe pain (7 - 10) (pain).   oxygen (O2) gas therapy  Self, Mother No No   Sig: Inhale 1 each continuously.   sennosides-docusate sodium (Nita-Colace) 8.6-50 mg tablet  Self, Mother Yes No   Sig: Take 2 tablets by mouth every 12 hours.   warfarin (Coumadin) 5 mg tablet   No No   Sig: Take 1 tablet (5 mg) by mouth once daily in the evening.      Facility-Administered Medications: None        The list below reflects the updated allergy list. Please review each documented allergy for additional clarification and justification.  Allergies  Reviewed by Ramil Reyes, RN on 9/11/2024   No Known Allergies         Patient declines M2B at discharge.     Sources:   Patient's mother Tati (good historian)  Medication Fill history  [facility]    Additional Comments:  None        GUILLE FRANCO  PGY-1 Pharmacy Resident  09/11/24     Secure Chat preferred   If no response call [phone] or [employer] \"Med Rec\"   "

## 2024-09-11 NOTE — PROGRESS NOTES
Medical Intensive Care - Daily Progress Note   Subjective    Senait Narvaez is a 54 y.o. female patient admitted on 9/10/2024 with following ICU needs: hypotension requiring vasopressors and acidosis with inappropriate respiratory compensation requiring intubation    Interval History:  Spoke with daughter this AM. She states that patient normally sees Dr. Whaley regarding her sickle cell disease. Per daughter, patient without recent travel or other infectious symptoms. States that patient complained of pain beginning last week and worsening until 9/9/24 when she also had associated lethargy. Patient stating that she has been in pain this morning.    Meds    Scheduled medications  acetaminophen, 650 mg, oral, Once  diphenhydrAMINE, 25 mg, oral, Once  etomidate, , ,   fentaNYL, 25 mcg, intravenous, Once  fentaNYL PF, , ,   folic acid, 1 mg, oral, Daily  pantoprazole, 40 mg, intravenous, Daily  perflutren protein A microsphere, 0.5 mL, intravenous, Once in imaging  phenylephrine HCl in 0.9% NaCl, , ,   piperacillin-tazobactam, 2.25 g, intravenous, q6h  polyethylene glycol, 17 g, oral, Daily  rocuronium, , ,   sennosides, 1 tablet, oral, BID  sodium chloride 0.9%, 10 mL, intra-catheter, Once  sodium chloride 0.9%, 10 mL, intra-catheter, Once  sodium zirconium cyclosilicate, 10 g, oral, TID  sulfur hexafluoride microsphr, 2 mL, intravenous, Once in imaging  [START ON 9/14/2024] thiamine, 100 mg, intravenous, Daily  thiamine, 500 mg, intravenous, TID      Continuous medications  calcium gluconate 2 g in 100 mL infusion, 25 mL/hr  dextrose 5%, 50 mL/hr  fentaNYL, 0-300 mcg/hr, Last Rate: 50 mcg/hr (09/11/24 0807)  norepinephrine, 0.01-1 mcg/kg/min, Last Rate: 0.09 mcg/kg/min (09/11/24 0859)  propofol, 5-50 mcg/kg/min, Last Rate: 20 mcg/kg/min (09/11/24 0801)  vasopressin, 0.03 Units/min, Last Rate: Stopped (09/11/24 0045)      PRN medications  PRN medications: albuterol, alteplase, calcium carbonate, dextrose, dextrose,  "diphenhydrAMINE, etomidate, fentaNYL, fentaNYL PF, glucagon, glucagon, oxygen, phenylephrine HCl in 0.9% NaCl, rocuronium, sodium chloride, vancomycin     Objective    Blood pressure (!) 98/49, pulse 98, temperature 36.6 °C (97.9 °F), temperature source Temporal, resp. rate 24, height 1.651 m (5' 5\"), weight 110 kg (242 lb 8.1 oz), SpO2 100%.     Temp  Min: 35.7 °C (96.3 °F)  Max: 36.6 °C (97.9 °F)  Pulse  Min: 57  Max: 102  BP  Min: 73/55  Max: 131/62  Resp  Min: 11  Max: 31  SpO2  Min: 97 %  Max: 100 %    Physical Exam  Constitutional:       Appearance: She is ill-appearing.      Interventions: She is intubated.      Comments: obese, RASS -1   Cardiovascular:      Pulses: Normal pulses.      Comments: Heart sounds difficult to auscultate over ventilator  Pulmonary:      Effort: She is intubated.      Comments: Rhonchorous breath sounds bilaterally, left worse than right  Abdominal:      General: Abdomen is flat.      Palpations: Abdomen is soft.   Skin:     General: Skin is warm and dry.   Neurological:      Mental Status: She is oriented to person, place, and time.      Comments: Able to nod her head yes and no appropriately to questions            Intake/Output Summary (Last 24 hours) at 9/11/2024 0908  Last data filed at 9/11/2024 0744  Gross per 24 hour   Intake 3847.47 ml   Output 460 ml   Net 3387.47 ml     Net IO Since Admission: 3,387.47 mL [09/11/24 0908]     Labs:   Results from last 72 hours   Lab Units 09/11/24  0504 09/11/24  0035 09/10/24  1912   HEMOGLOBIN g/dL 9.4* 6.0* 6.6*   HEMATOCRIT % 26.0* 15.7* 17.7*   WBC AUTO x10*3/uL 16.5* 16.7* 24.8*   PLATELETS AUTO x10*3/uL 108* 123* 111*   LYMPHO PCT MAN % 4.7 3.0 1.0   MONO PCT MAN % 4.7 6.0 3.0   EOSINO PCT MAN % 0.0 0.0 0.0      Results from last 72 hours   Lab Units 09/11/24  0504 09/11/24  0035 09/10/24  1912   SODIUM mmol/L 132* 131* 134*   POTASSIUM mmol/L 5.0 5.6* 6.3*   CHLORIDE mmol/L 95* 97* 97*   CO2 mmol/L 19* 18* 17*   BUN mg/dL 66* 67* " 64*   CREATININE mg/dL 3.76* 3.55* 3.32*   GLUCOSE mg/dL 120* 254* 53*   CALCIUM mg/dL 7.8* 7.4* 8.0*   ANION GAP mmol/L 23* 22* 26*   EGFR mL/min/1.73m*2 14* 15* 16*   PHOSPHORUS mg/dL 5.3* 6.7* 7.4*        Results from last 72 hours   Lab Units 09/11/24  0504 09/11/24  0035 09/10/24  2123 09/10/24  1912   LD U/L >12,000*  --   --  >12,000*   TROPHSCMC ng/L  --  1,186* 1,431* 1,315*        Micro/ID:   Lab Results   Component Value Date    BLOODCULT Loaded on Instrument - Culture in progress 09/10/2024    BLOODCULT Loaded on Instrument - Culture in progress 09/10/2024       Summary of key imaging results from the last 24 hours  CT C/A/P w/ contrast 9/11/24: hepatic venous congestion, diffuse mosaic attenuation of the lung parenchyma, which can be seen in the setting of small airway disease, pulmonary edema or acute infection   CT head wo IV contrast 9/11/24: No acute intracranial abnormality    Assessment and Plan     Assessment: Senait Narvaez is a 54 y.o. 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 Patient critically ill with hypotension initially requiring 2 pressors, CHARLES, acute liver injury 2/2 congestive hepatopathy, hemolytic anemia requiring transfusions, altered mentation with lethargy and not compensating appropriately respiratory wise for acidosis requiring intubation.    Mechanical Ventilation: < 4 days  Sedation/Analgesia:  Fentanyl and Propofol  Restraints: Restraints indicated as alternative therapies have been attempted and have been ineffective.  Restrain with soft wrist restraints and side rails up x4 until medical devices discontinued and/or  patient able to participate with plan of care.     Summary for 09/11/24:  S/p exchange transfusion 9/11 AM, transfusion team on board  Start trickle tube feeds  Remains on vasopressors, titrate to MAP >65  Consult cardiology given concerns for NSTEMI versus demand ischemia. Awaiting formal TTE this morning.  Consult nephrology given worsening CHARLES and possible need for dialysis. Ordered renal ultrasound and urine electrolytes.  Formal consult placed to hematology team  Consult hepatology given worsening transaminitis in the setting of acute liver injury. Ordered liver Doppler ultrasound and additional viral hepatitis workup    Plan:  NEUROLOGY/PSYCH:  #AMS  ::Likely in setting of critical illness with contribution of hypoglycemia  ::Intubated, sedated  ::CTH negative  -c/w fentanyl and propofol  -thiamine 500mg TID, c/w thiamine 100mg daily     #Cauda equina with saddle numbness  -no acute interventions    CARDIOVASCULAR:  #Hypotension  ::Mixed So2 demonstrates not entirely septic, may also be hypovolemic vs less likely cardiogenic shock on TTE  -continue levo and vaso, titrate to MAP 65+  -receiving fluids with blood transfusions x2 ovn  -S/p exchange transfusion 9/11 AM     #Elevated troponins, downtrended  #EKG with ST depressions  ::Bedside echo by cardiology fellow on admission without wall motion abnormalities/signs of ischemic changes  -troponin trended to peak  -Consulted cardiology, appreciate recommendations  -Follow-up TTE results  -serial EKGs  -discussed with cardiology fellow on admission, hold on aspirin load and anticoagulation at this time     #Hx SVT  -hold home metoprolol tartrate 12.5mg BID    PULMONARY:  #Intubated  #CAN  #pHTN (6/2023)  #Chronic 2L O2 at night use  ::On 2L O2 on arrival, denying dyspnea and cough  ::CT chest with signs of pulmonary edema  -wean vent settings as tolerated, SBT today  -albuterol nebulizer for wheezing  -hold home Lasix 20    RENAL/GENITOURINARY:  #CHARLES on CKD  II  #Severe hyperkalemia, resolving  #Hyponatremia  ::Pre-renal CAHRLES likely iso hypotension  ::Baseline cre 0.8-1, now 4.07  ::EKG with peaked T waves previously  ::Now s/p IV contrast on 9/10 with CT scan  ::9/10 OSH UA: turbid, 2+ blood, 1+ protein, 250LE, negative nitrite, rare bacteria, few squamous  -monitor BMP q4hrs  -hyperkalemia cocktail as needed  -lokelma 10g TID  -Follow-up renal ultrasound  -Follow up urine lytes  -Nephrology consulted given concerns for possible need for RRT     #Mixed HAGMA/NAGMA with secondary respiratory alkalosis  #Elevated lactate (likely Type A)  -trend ABG and lactate     #Hypocalcemia  -Replete PRN    GASTROENTEROLOGY:  #Acute liver injury 2/2 hemodynamic instability versus sickle cell crisis  ::On presentation to  MICU ALT 2611, AST 4727, T bili 10.2, INR 3.0  ::CT with hepatic venous congestion, patient lacks gallbladder  -trend MELD labs  -Hepatology consulted if continues to worsen  -Sent viral hepatitis panel, EBV, CMV  -Ordered liver US with Doppler  -Ordered CK  -Hepatology on board, appreciate recommendations    #Bowel regimen  -c/w sennosides  -c/w miralax    ENDOCRINOLOGY:  #Hypoglycemia  ::Likely iso critical illness  -q4hr glucose checks with hypoglycemia protocol  -start D10 gtt as needed    HEMATOLOGY:  #Sickle cell crisis  #Hgb SC disease (previously on exchange transfusions q4-6 weeks, last transfusion 3/1/24)  #Hemolytic anemia  ::LDH>12,000, haptoglobin <30, fibrinogen 318, retic % 5.2  :: HgB electrophoresis approx 50% HbC and 50% HbS  -formal consult to placed to hematology and sickle cell teams  -transfusion medicine consulted, appreciate recs  -Exchange transfusion completed this a.m  -pending peripheral smear  -daily LDH, retics  -Pain management with fentanyl 25mcg PRN     #Hx recurrent DVT/PE (on lifelong Coumadin)  # hx of SVC syndrome  ::Per family, facial/neck appearance on presentation at her baseline  - Hx SVC stricture s/p SVC angioplasty  - B/l  Upper Extremity and Facial Swelling d/t partial filling defect within the SVC and a thrombus of the SVC complicated by bilateral Mediport catheters. On lifelong anticoagulation, DOAC discouraged due to high risk of clotting.  -hold home warfarin 5mg daily (INR 3.0), trend INR daily  -Will continue ongoing discussions with hematology regarding anticoagulation    MUSCULOSKELETAL/ SKIN:  #Fibromyalgia  #Raynaud's disease  #Muscle spasms  -no active interventions at this time  -holding home flexuril 10mg TID prn muscle spasms due to AMS    INFECTIOUS DISEASE:  #Leukocytosis  #Concern for some component of sepsis  ::Unclear infectious source. No acute findings on CT C/A/P. Patient had denied urinary symptoms prior to intubation.   ::S/p 2L fluids  -c/w vancomycin  -c/w zosyn  -pending procal    MISC:  -holding home vitamin C, elderberry, loratidine 10mg, zofran 4mg, oxycodone 10mg q4h prn, MVI     ICU Check List       ICU Liberation: Intervention:   Assess, Prevent, Manage Pain CPOT - Fentanyl bolus PRN   Both SAT and SBT [] SAT  [x] SBT 30-60 min [] Extubate to NC  [] Extubate to NIV  [] Discuss Trach   Choice of analgesia and sedation RASS: 0/-1  Fentanyl and Propofol    Delirium: Assess, prevent and manage CAM -    Early Mobility and Exercise  [] PT /OT consult   Family Engagement and Empowerment [x]Family updated []SW consult     FEN   Fluids: PRN  Electrolytes: PRN  Nutrition: NPO Diet; Effective now     Prophylaxis:  DVT ppx: SCDs, holding home warfarin (INR 3.0)  GI ppx: PPI  Bowel care: MiraLAX, senna BID    Hardware:  Catheter: Left internal jugular Trialysis (placed 9/10/24)  Access: pIV, OG  Drains: Slater  Lines: L radial Deonna (placed 9/10/24)    Social:  Code: Full Code    HPOA: Tati Salgado (mother) [phone]. Prior to intubation patient listed mother as first decision maker then daughter Tiesha ([phone]).  Disposition: MICU pending extubation    Salvador Rosario MD   09/11/24 at 9:08 AM     Disclaimer:  Documentation completed with the information available at the time of input. The times in the chart may not be reflective of actual patient care times, interventions, or procedures. Documentation occurs after the physical care of the patient.

## 2024-09-11 NOTE — PROCEDURES
Central Line Placement Procedure Note    Indication: dialysis/hemapheresis     Consent: the patient, POA, and emergent    UNIVERSAL PROTOCOL/ TIMEOUT:  Preprocedure verification is complete patient verified and consents confirmed, procedure sites are identified and marked, timeout was called before the start of the procedure.     right internal jugular vein was prepped with betadine and draped in a sterile fashion. Local anesthesia was with 1% lidocaine was used. Real time ultrasound was utilized throughout this procedure. A large bore needle was used to identify the vein.  A guide wire was then inserted into the vein through the needle. Triple lumen catheter was then inserted into the vessel over the guide wire using the Seldinger technique.  All ports showed good, free flowing blood return and were flushed with saline solution.  The catheter was then securely fastened to the skin with sutures and covered with a sterile dressing.  A post procedure X-ray showed good line position.    The patient tolerated the procedure Patient tolerated the procedure well.    Complications: None     Appropriate/Playful

## 2024-09-11 NOTE — SIGNIFICANT EVENT
Brief Cardiology Note    54F Hb SC, recurrent VT/PE with SVC syndrome on coumadin, p/w generalized pain including chest pain and multi-organ dysfunction in shock concerning for sickle cell vaso-occlusive crisis +- sepsis. EKG showing ST depressions V1-V3 and troponin elevated to 1400 downtrended to 1186 (299 at [facility] prior to presentation at ). Patient also with Hb of 6 and supratherapeutic INR. Bedside echo showing hyperdynamic LV, LVOT VTI of 20cm, dilated RV.     Given overall picture, troponin elevation/EKG changes without any WMA on echo and multiple medical issues that are causing increasing demand, this seems more consistent with a type II NSTEMI and in the setting of anemia and elevated INR can hold off on starting AC and anti-PLT therapy at this time.     Recommendations:  [ ] Serial EKGs (please place in media or muse)  [ ] Please order formal complete TTE   [ ] Formal cardiology consult in AM

## 2024-09-11 NOTE — PROCEDURES
Arterial Line Placement    The patient was prepped and draped in the usual sterile manner using chlorhexidine scrub. 1% lidocaine was used to numb the region. The L radial artery was palpated and visualized with ultrasound and successfully cannulated on the first pass. Pulsatile, arterial blood was visualized and the artery was then threaded using the Seldinger technique and a catheter was then sutured into place. Good wave-form was obtained. The patient tolerated the procedure well without any immediate complications. The area was cleaned and Tegaderm was applied.

## 2024-09-11 NOTE — CONSULTS
Name: Senait Narvaez  MRN: [id number]  Encounter Date: 9/11/2024  PCP: No Assigned PCP Generic Provider, MD  Heme-Onc: Dr. Hill    Reason for consult: Hx of SC disease, shock with multiorgan system failure, now s/p exchange transfusion  Attending Provider: Amanda LU    Hematology/ Oncology Consult Note      History of Present Illness   Senait Narvaez is a 54 y.o. 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 Patient was unsure of trigger for crisis, and endorses aching chest pain. Labs in outside ED showed significant leukocytosis, transaminitis, elevated lactate, and hyperkalemia. Blood cx x2 were drawn. See admission HPI for more information.    When patient arrived to [facility] MICU, patient was hypotensive to 69/54 and requiring 2L NC. Patient had elevated troponins, lactate and was acidotic on ABG and VBG. Dilaudid was given for pain and 2 units pRBCs. Increasing oxygen requirements necessitated intubation, and patient required two pressors.     Echo found no left heart abnormalities and image quality was poor so pHTN could not be assessed. CT chest with signs of pulmonary edema.    Unclear infectious source, concern for sepsis, so patient was started on vanc/zosyn.    This AM patient received automated red blood cell exchange with 6u pRBCs.     Heme/Onc History    PMH of Hgb SC disease managed by Dr. Whaley. Patient previously dependent on red cell exchange transfusion which was on hold per patient preference. Hx of avascular necrosis of R hip     From 12/06/2023 Heme Onc note-   She was diagnosed with SVC syndrome,  she had Bilateral Upper Extremity and Facial Swelling d/t partial filling defect within the SVC and a thrombus of the SVC complicated by bilateral Mediport catheters. Vasc med consulted, rec lifelong coumadin. She was on heparin drip bridge and coumadin was initiated on 1/16. INR 3.0 on 1/29/2019. She is status post Venogram, SVC balloon angioplasty and Right mediport removal (1/15/20) and status post placement of left IJ temporary apharesis catheter, SVC angiogram, Balloon angioplasty of SVC/left brachiocephalic vein, Removal of left sided Mediport catheter (1/21/20). She was given IV diuretics lasix 2/26-29 for UE edema 2/2 SVC syndrome with edema. Breast remain swollen recommended breast binder. SVC in Nov 2022 with stent placed.     H&P from 1/8/2018 refers to multiorgan failure x2 with hepatopathy (once in Dec 2017)    Past Medical history     Past Medical History:   Diagnosis Date    Asthma (HHS-HCC)     CHF (congestive heart failure) (Multi)     Chronic pain disorder     Compression of vein 02/19/2020    Superior vena cava syndrome    Hypotension, unspecified 12/10/2020         Past Surgical History     Past Surgical History:   Procedure Laterality Date    APPENDECTOMY  08/05/2013    Appendectomy    CHOLECYSTECTOMY  08/05/2013    Cholecystectomy    CT ANGIO NECK  10/19/2022    CT NECK ANGIO W AND WO IV CONTRAST 10/19/2022 [facility] CLINICAL LEGACY    CT GUIDED PERCUTANEOUS BIOPSY LYMPH NODE SUPERFICIAL  9/19/2019    CT GUIDED PERCUTANEOUS BIOPSY LYMPH NODE SUPERFICIAL 9/19/2019 [facility] INPATIENT LEGACY    IR CVC NONTUNNELED  10/26/2023    IR CVC NONTUNNELED 10/26/2023 [facility] ANGIO    IR CVC NONTUNNELED  12/7/2023    IR CVC NONTUNNELED 12/7/2023 Marcos Moser MD [facility] ANGIO    IR CVC TUNNELED  3/13/2015    IR CVC TUNNELED 3/13/2015 [facility] CLINICAL LEGACY    IR CVC TUNNELED  1/29/2016    IR CVC TUNNELED 1/29/2016 [facility] AIB LEGACY    IR CVC TUNNELED  1/17/2018    IR CVC TUNNELED 1/17/2018 [facility] AIB LEGACY    IR CVC TUNNELED   2/22/2018    IR CVC TUNNELED 2/22/2018 [facility] AIB LEGACY    IR CVC TUNNELED  3/22/2018    IR CVC TUNNELED 3/22/2018 [facility] CLINICAL LEGACY    IR CVC TUNNELED  4/18/2018    IR CVC TUNNELED 4/18/2018 [facility] AIB LEGACY    IR CVC TUNNELED  5/16/2018    IR CVC TUNNELED 5/16/2018 [facility] AIB LEGACY    IR CVC TUNNELED  6/12/2018    IR CVC TUNNELED 6/12/2018 [facility] [place] AIB LEGACY    IR CVC TUNNELED  1/21/2020    IR CVC TUNNELED 1/21/2020 [facility] INPATIENT LEGACY    IR CVC TUNNELED  2/28/2020    IR CVC TUNNELED 2/28/2020 [facility] ANCILLARY LEGACY    IR CVC TUNNELED  4/10/2020    IR CVC TUNNELED 4/10/2020 [facility] AIB LEGACY    IR CVC TUNNELED  5/22/2020    IR CVC TUNNELED 5/22/2020 [facility] [place] ANCILLARY LEGACY    IR CVC TUNNELED  6/19/2020    IR CVC TUNNELED 6/19/2020 [facility] AIB LEGACY    IR CVC TUNNELED  7/23/2020    IR CVC TUNNELED 7/23/2020 [facility] AIB LEGACY    IR CVC TUNNELED  9/4/2020    IR CVC TUNNELED 9/4/2020 [facility] AIB LEGACY    IR CVC TUNNELED  10/9/2020    IR CVC TUNNELED 10/9/2020 [facility] ANCILLARY LEGACY    IR CVC TUNNELED  11/13/2020    IR CVC TUNNELED 11/13/2020 [facility] AIB LEGACY    IR CVC TUNNELED  12/18/2020    IR CVC TUNNELED 12/18/2020 [facility] [place] [facility] AIB LEGACY    IR CVC TUNNELED  1/22/2021    IR CVC TUNNELED 1/22/2021 [facility] AIB LEGACY    IR CVC TUNNELED  2/26/2021    IR CVC TUNNELED 2/26/2021 [facility] CLINICAL LEGACY    IR CVC TUNNELED  4/2/2021    IR CVC TUNNELED 4/2/2021 [facility] AIB LEGACY    IR CVC TUNNELED  5/7/2021    IR CVC TUNNELED 5/7/2021 [facility] ANCILLARY LEGACY    IR CVC TUNNELED  6/11/2021    IR CVC TUNNELED 6/11/2021 [facility] AIB LEGACY    IR CVC TUNNELED  7/16/2021    IR CVC TUNNELED 7/16/2021 [facility] ANCILLARY LEGACY    IR CVC TUNNELED  8/20/2021    IR CVC TUNNELED 8/20/2021 [facility] AIB LEGACY    IR CVC TUNNELED  9/24/2021    IR CVC TUNNELED 9/24/2021 [facility] AIB LEGACY    IR CVC TUNNELED  10/29/2021    IR CVC TUNNELED 10/29/2021 [facility] AIB LEGACY    IR CVC TUNNELED  12/3/2021    IR CVC TUNNELED 12/3/2021 [facility] AIB LEGACY    IR CVC TUNNELED  1/7/2022    IR CVC TUNNELED 1/7/2022 [facility] ANCILLARY  LEGACY    IR CVC TUNNELED  2/23/2022    IR CVC TUNNELED 2/23/2022 [facility] CLINICAL LEGACY    IR CVC TUNNELED  3/25/2022    IR CVC TUNNELED 3/25/2022 [facility] ANCILLARY LEGACY    IR CVC TUNNELED  4/22/2022    IR CVC TUNNELED 4/22/2022 [facility] ANCILLARY LEGACY    IR CVC TUNNELED  6/3/2022    IR CVC TUNNELED 6/3/2022 [facility] ANCILLARY LEGACY    IR CVC TUNNELED  9/18/2017    IR CVC TUNNELED 9/18/2017 [facility]    IR CVC TUNNELED  10/30/2017    IR CVC TUNNELED 10/30/2017 [facility]    IR CVC TUNNELED  12/19/2017    IR CVC TUNNELED 12/19/2017 [facility]    IR CVC TUNNELED  1/6/2023    IR CVC TUNNELED 1/6/2023 [facility] LEGACY    IR CVC TUNNELED  2/24/2023    IR CVC TUNNELED [facility] ANGIO    IR CVC TUNNELED  7/8/2022    IR CVC TUNNELED 7/8/2022 [facility] ANCILLARY LEGACY    IR CVC TUNNELED  8/12/2022    IR CVC TUNNELED 8/12/2022 [facility] CLINICAL LEGACY    IR CVC TUNNELED  9/16/2022    IR CVC TUNNELED 9/16/2022 [facility] ANCILLARY LEGACY    IR CVC TUNNELED  10/20/2022    IR CVC TUNNELED 10/20/2022 [facility] CLINICAL LEGACY    IR CVC TUNNELED  11/29/2022    IR CVC TUNNELED 11/29/2022 [facility] ANCILLARY LEGACY    IR CVC TUNNELED  3/31/2023    IR CVC TUNNELED [facility] ANGIO    IR CVC TUNNELED  6/9/2023    IR CVC TUNNELED 6/9/2023 [facility] ANGIO    IR CVC TUNNELED  7/20/2023    IR CVC TUNNELED 7/20/2023 [facility] ANGIO    IR CVC TUNNELED  8/16/2023    IR CVC TUNNELED 8/16/2023 [facility] ANGIO    IR CVC TUNNELED  9/21/2023    IR CVC TUNNELED 9/21/2023 [facility] ANGIO    MR HEAD ANGIO WO IV CONTRAST  8/16/2014    MR HEAD ANGIO WO IV CONTRAST 8/16/2014 [facility] ANCILLARY LEGACY    MR NECK ANGIO WO IV CONTRAST  8/16/2014    MR NECK ANGIO WO IV CONTRAST 8/16/2014 [facility] ANCILLARY LEGACY    OTHER SURGICAL HISTORY  05/13/2014    Fluid Drained, Retina Inspected: Breaks Supported By Buckle    OTHER SURGICAL HISTORY  10/09/2014    Closed Treatment Of Fracture Of The Lateral Malleolus    OTHER SURGICAL HISTORY  06/09/2014    Salpingo-oophorectomy Right Side    US GUIDED BIOPSY LYMPH NODE SUPERFICIAL   9/17/2019    US GUIDED BIOPSY LYMPH NODE SUPERFICIAL 9/17/2019 [facility] INPATIENT LEGACY         Family History      Family History   Problem Relation Name Age of Onset    Diabetes Father      Gout Father      Sickle cell trait Father      Seizures Sister      Diabetes Other      Uterine cancer Other      Other (congenital blindness) Cousin           Social History     Social History     Socioeconomic History    Marital status: Single   Tobacco Use    Smoking status: Former     Current packs/day: 0.00     Types: Cigarettes     Quit date: 2014     Years since quitting: 10.7     Passive exposure: Past    Smokeless tobacco: Never   Substance and Sexual Activity    Alcohol use: Not Currently    Drug use: Not Currently     Social Determinants of Health     Financial Resource Strain: Patient Unable To Answer (9/11/2024)    Overall Financial Resource Strain (CARDIA)     Difficulty of Paying Living Expenses: Patient unable to answer   Food Insecurity: Unknown (9/10/2024)    [facility]    Hunger Vital Sign     Worried About Running Out of Food in the Last Year: Never true   Transportation Needs: Patient Unable To Answer (9/11/2024)    PRAPARE - Transportation     Lack of Transportation (Medical): Patient unable to answer     Lack of Transportation (Non-Medical): Patient unable to answer   Physical Activity: Patient Declined (10/26/2023)    Exercise Vital Sign     Days of Exercise per Week: Patient declined     Minutes of Exercise per Session: Patient declined   Housing Stability: Patient Unable To Answer (9/11/2024)    Housing Stability Vital Sign     Unable to Pay for Housing in the Last Year: Patient unable to answer     Number of Times Moved in the Last Year: 0     Homeless in the Last Year: Patient unable to answer         Allergies   No Known Allergies    Medications   fentaNYL, 25 mcg, Once  folic acid, 1 mg, Daily  pantoprazole, 40 mg, Daily  perflutren protein A microsphere, 0.5 mL,  "Once in imaging  perflutren protein A microsphere, 0.5 mL, Once in imaging  piperacillin-tazobactam, 2.25 g, q6h  polyethylene glycol, 17 g, Daily  sennosides, 1 tablet, BID  sodium zirconium cyclosilicate, 10 g, TID  sulfur hexafluoride microsphr, 2 mL, Once in imaging  sulfur hexafluoride microsphr, 2 mL, Once in imaging  [START ON 2024] thiamine, 100 mg, Daily  thiamine, 500 mg, TID      fentaNYL, Last Rate: 50 mcg/hr (24 1255)  norepinephrine, Last Rate: 0.1 mcg/kg/min (24 1227)  propofol, Last Rate: 20 mcg/kg/min (24 1307)  vasopressin, Last Rate: 0.03 Units/min (24 1036)      albuterol, 2.5 mg, q6h PRN  alteplase, 2 mg, PRN  dextrose, 12.5 g, q15 min PRN  dextrose, 25 g, q15 min PRN  fentaNYL, 25 mcg, q10 min PRN  glucagon, 1 mg, q15 min PRN  glucagon, 1 mg, q15 min PRN  oxygen, , Continuous PRN - O2/gases  vancomycin, , Daily PRN        Review of Systems   Review of Systems   10 pt ROS reviewed and negative aside from above    Physical Exam   Blood pressure 93/53, pulse 91, temperature 36.5 °C (97.7 °F), temperature source Temporal, resp. rate 24, height 1.651 m (5' 5\"), weight 110 kg (242 lb 8.1 oz), SpO2 100%.    ECO    Gen: intubated, sedated  HEENT: AT/NC  CV: unable to auscultate over ventilator  Pulm: rhonchi bilaterally  Abd: soft, NT/ND, no organomegaly  Ext: no LE edema  Skin: warm and dry      Labs     Lab Results   Component Value Date    GLUCOSE 116 (H) 2024    CALCIUM 8.0 (L) 2024     (L) 2024    K 4.9 2024    CO2 17 (L) 2024    CL 96 (L) 2024    BUN 61 (H) 2024    CREATININE 4.07 (H) 2024       Lab Results   Component Value Date    WBC 10.1 2024    HGB 9.8 (L) 2024    HCT 27.4 (L) 2024    MCV 84 2024    PLT 58 (L) 2024       Lab Results   Component Value Date    ALT 4,119 (H) 2024    AST >10,000 (H) 2024    ALKPHOS 209 (H) 2024    BILITOT 10.7 (H) 2024      " Most Recent   NORA-POLYSPECIFIC NEG  9/11/24 00:35      Latest Reference Range & Units 09/11/24 05:04   WBC 4.4 - 11.3 x10*3/uL 16.5 (H)   nRBC 0.0 - 0.0 /100 WBCs 378.3 (H)   RBC 4.00 - 5.20 x10*6/uL 3.16 (L)   HEMOGLOBIN 12.0 - 16.0 g/dL 9.4 (L)   HEMATOCRIT 36.0 - 46.0 % 26.0 (L)   MCV 80 - 100 fL 82   MCH 26.0 - 34.0 pg 29.7   MCHC 32.0 - 36.0 g/dL 36.2 (H)   RED CELL DISTRIBUTION WIDTH 11.5 - 14.5 % 24.6 (H)   Platelets 150 - 450 x10*3/uL 108 (L)   Immature Granulocytes %, Automated 0.0 - 0.9 % 1.2 (H)   Immature Granulocytes Absolute, Automated 0.00 - 0.70 x10*3/uL 0.19   Neutrophils %, Manual 40.0 - 80.0 % 48.4   Bands %, Manual 0.0 - 5.0 % 42.2   Lymphocytes %, Manual 13.0 - 44.0 % 4.7   Monocytes %, Manual 2.0 - 10.0 % 4.7   Eosinophils %, Manual 0.0 - 6.0 % 0.0   Basophils %, Manual 0.0 - 2.0 % 0.0   Seg Neutrophils Absolute, Manual 1.20 - 7.00 x10*3/uL 7.99 (H)   Bands Absolute, Manual 0.00 - 0.70 x10*3/uL 6.96 (H)   Lymphocytes Absolute, Manual 1.20 - 4.80 x10*3/uL 0.78 (L)   Monocytes Absolute, Manual 0.10 - 1.00 x10*3/uL 0.78   Eosinophils Absolute, Manual 0.00 - 0.70 x10*3/uL 0.00   Basophils Absolute, Manual 0.00 - 0.10 x10*3/uL 0.00   Total Cells Counted  64   Neutrophils Absolute, Manual 1.20 - 7.70 x10*3/uL 14.95 (H)   RBC Morphology  See Below   Polychromasia  Mild   RBC Fragments  Few   Target Cells  Few   Teardrop Cells  Few   Tivoli Cells  Few   Acanthocytes  Few   Basophilic Stippling  Present   [place] Bodies  Present   Pappenheimer Bodies  Present   (H): Data is abnormally high  (L): Data is abnormally low   Latest Reference Range & Units Most Recent   Hemoglobin A  COMMENT ONLY (P)  9/11/24 12:13   Hemoglobin F  COMMENT ONLY (P)  9/11/24 12:13   Hemoglobin S <=0.0 % 14.7 (H) (P)  9/11/24 12:13   Hemoglobin C <=0.0 % 18.1 (H) (P)  9/11/24 12:13   Hemoglobin A2  COMMENT ONLY (P)  9/11/24 12:13   Hemoglobin Other % 3.5  9/10/18 05:38   Hemoglobin Identification Interpretation  COMMENT ONLY  (P)  9/11/24 12:13   Pathologist Review-Hemoglobin Identification  COMMENT ONLY (P)  9/11/24 12:13   (H): Data is abnormally high  (P): Preliminary    Slide review:  Anisocytosis with polychromasia. Occasional nucleated RBCs. Few sickled cells, few giant platelets. Reticulocytes noted.  No schistocytes. Refer to slide photos in media tab.  Imaging   XR chest 1 view 9/11  1.  Question slight interval worsening in left basilar  atelectasis/infiltrate.    CTAP w IV contrast 9/11  IMPRESSION:  1. Heterogeneous mottled mosaic pattern of enhancement is present within the hepatic parenchyma, which can be seen in the setting of hepatic venous congestion.  2. Diffuse mosaic attenuation of the lung parenchyma, which can be seen in the setting of small airway disease, pulmonary edema or acute infection.  3. Additional findings stated in report.    CT head wo IV contrast 9/11  IMPRESSION:  No acute intracranial hemorrhage or mass effect.  Mild degree of nonspecific white matter hypodensity is compatible with microangiopathy.      Assessment/Plan     Senait Narvaez is a 54 y.o. 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 Patient was transferred to [facility] MICU and subsequently had hypotension requiring vasopressors and acidosis with inadequate respiratory compensation requiring intubation.     NORA is negative, and review of blood smear did not show schistocytes or other evidence of hemolysis. Autoimmune hemolysis not supported by work up she has not had a transfusion in a few months as relates to  is no evidence that patient has had hemolytic reactions previously. 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    Etiology for new onset hypoxia is unclear in this patient, as it could be an early or atypical presentation of acute chest syndrome, or pulmonary embolism. Infection also cannot be ruled out. Patient is severely thrombocytopenic, and warfarin has not been reversed. Primary team can consider reversal.    Patient is s/p exchange transfusion (6u pRBC)and 2u pRBC, which should help prevent further sickling and its sequelae. Hgb S is well below goal of 30% of total Hgb. We recommend CBCs, reticulocyte counts, LDH, and DIC panel daily.    Thank you for this consult, we will continue to follow. Patient seen and discussed with attending physician, Dr. Hill, who agrees with the above.     Irene Jarrett, MS4  Hematology Consult Pager: [phone]  Oncology Consult Pager: [phone]

## 2024-09-11 NOTE — CONSULTS
[facility]   [facility]  INITIAL CONSULT NOTE       Reason For Consult  Elevated LFTs    SUBJECTIVE     History Of Present Illness  Senait Narvaez is a 54 y.o. 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 Hepatology is consulted for Elevated LFTs.    Patient presented due to diffuse body pain that started on 9/10, rated 7/10, similar to previous episodes, mainly in her legs, but also stating chest pain. No triggers identified. With hypotension initially requiring 2 pressors, CHARLES, acute liver injury, hemolytic anemia requiring transfusions. Also altered mental status with lethargy requiring intubation and mechanical ventilation. Patient initiated today exchange transfusion.     Appears that previously LFTs have been relatively normal except her a persistently elevated bilirubin likely iso known sickle cell disease recently LFTS, 8/2024 , ALT 16, AST 21, Tbili 3.0, Dbili 0.4 and on presentation to OSH 9/10 alkphos 156, , , tbili 11.1 and then on presentation to  MICU with noted BP 69/54 prior to pressor initiation LFT   9/10 , ALT 2611, AST 4,727, Tbili 10.2, Dbili 3.1 and have since increased to , ALT 4,119, AST >10,000.     Acute hepatitis panel negative, acetaminophen level 10.1, alcohol <10, salicylate <3, .     Review of Systems  Unable to obtain       Past Medical History:    Past Medical History:   Diagnosis Date    Asthma (HHS-HCC)     CHF (congestive heart failure)  (Multi)     Chronic pain disorder     Compression of vein 02/19/2020    Superior vena cava syndrome    Hypotension, unspecified 12/10/2020       Home Medications  Medications Prior to Admission   Medication Sig Dispense Refill Last Dose    albuterol 90 mcg/actuation inhaler Inhale 2 puffs every 6 hours if needed for wheezing. 18 g 11     ascorbic acid (Vitamin C) 500 mg chewable tablet Chew 1 tablet (500 mg) once daily. As needed       cyclobenzaprine (Flexeril) 10 mg tablet Take 1 tablet (10 mg) by mouth 3 times a day as needed for muscle spasms. 30 tablet 3     elderberry fruit 350 mg capsule Take 1 capsule by mouth once daily.       fluticasone (Flonase) 50 mcg/actuation nasal spray Administer 2 sprays into each nostril if needed.       folic acid (Folvite) 1 mg tablet Take 1 tablet (1 mg) by mouth once daily. 90 tablet 3     furosemide (Lasix) 20 mg tablet Take 1 tablet (20 mg) by mouth every other day. 45 tablet 3     loratadine (Claritin) 10 mg tablet Take 1 tablet (10 mg) by mouth once daily as needed for allergies.       metoprolol tartrate (Lopressor) 25 mg tablet Take 0.5 tablets (12.5 mg) by mouth 2 times a day. 90 tablet 3     multivitamin with minerals tablet Take 1 tablet by mouth once daily.       naloxone (Narcan) 4 mg/0.1 mL nasal spray Administer 1 spray (4 mg) into affected nostril(s) if needed for opioid reversal. May repeat every 2-3 minutes if needed, alternating nostrils, until medical assistance becomes available. 2 each 0     ondansetron (Zofran) 4 mg tablet Take 1 tablet (4 mg) by mouth every 8 hours if needed for nausea or vomiting. 20 tablet 2     oxyCODONE (Roxicodone) 10 mg immediate release tablet Take 1 tablet (10 mg) by mouth every 4 hours if needed for severe pain (7 - 10) (pain). 75 tablet 0     oxygen (O2) gas therapy Inhale 1 each continuously. 1 each 0     sennosides-docusate sodium (Nita-Colace) 8.6-50 mg tablet Take 2 tablets by mouth every 12 hours.       warfarin (Coumadin)  5 mg tablet Take 1 tablet (5 mg) by mouth once daily in the evening. 30 tablet 3          Surgical History:    Past Surgical History:   Procedure Laterality Date    APPENDECTOMY  08/05/2013    Appendectomy    CHOLECYSTECTOMY  08/05/2013    Cholecystectomy    CT ANGIO NECK  10/19/2022    CT NECK ANGIO W AND WO IV CONTRAST 10/19/2022 [facility] CLINICAL LEGACY    CT GUIDED PERCUTANEOUS BIOPSY LYMPH NODE SUPERFICIAL  9/19/2019    CT GUIDED PERCUTANEOUS BIOPSY LYMPH NODE SUPERFICIAL 9/19/2019 [facility] INPATIENT LEGACY    IR CVC NONTUNNELED  10/26/2023    IR CVC NONTUNNELED 10/26/2023 [facility]    IR CVC NONTUNNELED  12/7/2023    IR CVC NONTUNNELED 12/7/2023 Marcos Moser MD [facility]GIO    IR CVC TUNNELED  3/13/2015    IR CVC TUNNELED 3/13/2015 [facility] CLINICAL LEGACY    IR CVC TUNNELED  1/29/2016    IR CVC TUNNELED 1/29/2016 [facility] AIB LEGACY    IR CVC TUNNELED  1/17/2018    IR CVC TUNNELED 1/17/2018 [facility]IB LEGACY    IR CVC TUNNELED  2/22/2018    IR CVC TUNNELED 2/22/2018 [facility]IB LEGACY    IR CVC TUNNELED  3/22/2018    IR CVC TUNNELED 3/22/2018 [facility] CLINICAL LEGACY    IR CVC TUNNELED  4/18/2018    IR CVC TUNNELED 4/18/2018 [facility]IB LEGACY    IR CVC TUNNELED  5/16/2018    IR CVC TUNNELED 5/16/2018 [facility]IB LEGACY    IR CVC TUNNELED  6/12/2018    IR CVC TUNNELED 6/12/2018 [facility]IB LEGACY    IR CVC TUNNELED  1/21/2020    IR CVC TUNNELED 1/21/2020 [facility] INPATIENT LEGACY    IR CVC TUNNELED  2/28/2020    IR CVC TUNNELED 2/28/2020 [facility] ANCILLARY LEGACY    IR CVC TUNNELED  4/10/2020    IR CVC TUNNELED 4/10/2020 [facility]IB LEGACY    IR CVC TUNNELED  5/22/2020    IR CVC TUNNELED 5/22/2020 [facility] ANCILLARY LEGACY    IR CVC TUNNELED  6/19/2020    IR CVC TUNNELED 6/19/2020 [facility]IB LEGAC[facility]    IR CVC TUNNELED  7/23/2020    IR CVC TUNNELED 7/23/2020 [facility]IB LEGACY    IR CVC TUNNELED  9/4/2020    IR CVC TUNNELED 9/4/2020 [facility]IB LEGACY    IR CVC TUNNELED  10/9/2020    IR CVC TUNNELED 10/9/2020 [facility] ANCILLARY LEGACY    IR CVC TUNNELED  11/13/2020    IR CVC  TUNNELED 11/13/2020 [facility]IB LEGACY    IR CVC TUNNELED  12/18/2020    IR CVC TUNNELED 12/18/2020 [facility] AIB LEGACY    IR CVC TUNNELED  1/22/2021    IR CVC TUNNELED 1/22/2021 [facility] AIB LEGACY    IR CVC TUNNELED  2/26/2021    IR CVC TUNNELED 2/26/2021 [facility] CLINICAL LEGACY    IR CVC TUNNELED  4/2/2021    IR CVC TUNNELED 4/2/2021 [facility]GAC[facility]    IR CVC TUNNELED  5/7/2021    IR CVC TUNNELED 5/7/2021 [facility]CILLARY LEGACY    IR CVC TUNNELED  6/11/2021    IR CVC TUNNELED 6/11/2021 [facility]IB LEGAC[facility]    IR CVC TUNNELED  7/16/2021    IR CVC TUNNELED 7/16/2021 [facility]CILLARY LEGACY    IR CVC TUNNELED  8/20/2021    IR CVC TUNNELED 8/20/2021 [facility] AIB LEGACY    IR CVC TUNNELED  9/24/2021    IR CVC TUNNELED 9/24/2021 [facility]GAC[facility]    IR CVC TUNNELED  10/29/2021    IR CVC TUNNELED 10/29/2021 [facility]IB LEGACY    IR CVC TUNNELED  12/3/2021    IR CVC TUNNELED 12/3/2021 [facility]GAC[facility]    IR CVC TUNNELED  1/7/2022    IR CVC TUNNELED 1/7/2022 [facility] ANCILLARY LEGACY    IR CVC TUNNELED  2/23/2022    IR CVC TUNNELED 2/23/2022 [facility] CLINICAL LEGACY    IR CVC TUNNELED  3/25/2022    IR CVC TUNNELED 3/25/2022 [facility]LL[facility] LEGAC[facility]    IR CVC TUNNELED  4/22/2022    IR CVC TUNNELED 4/22/2022 [facility]GAC[facility]    IR CVC TUNNELED  6/3/2022    IR CVC TUNNELED 6/3/2022 [facility]LLARY LEGAC[facility]    IR CVC TUNNELED  9/18/2017    IR CVC TUNNELED 9/18/2017 [facility]GAC[facility]    IR CVC TUNNELED  10/30/2017    IR CVC TUNNELED 10/30/2017 [facility]    IR CVC TUNNELED  12/19/2017    IR CVC TUNNELED 12/19/2017 [facility]IB [facility]GAC[facility]    IR CVC TUNNELED  1/6/2023    IR CVC TUNNELED 1/6/2023 [facility]    IR CVC TUNNELED  2/24/2023    IR CVC TUNNELED [facility]    IR CVC TUNNELED  7/8/2022    IR CVC TUNNELED 7/8/2022 [facility]LL[facility] LEGACY    IR CVC TUNNELED  8/12/2022    IR CVC TUNNELED 8/12/2022 [facility] CLINICAL LEGACY    IR CVC TUNNELED  9/16/2022    IR CVC TUNNELED 9/16/2022 [facility]GAC[facility]    IR CVC TUNNELED  10/20/2022    IR CVC TUNNELED 10/20/2022 [facility] CLINICAL LEGACY     IR CVC TUNNELED  11/29/2022    IR CVC TUNNELED 11/29/2022 [facility]LL[facility]GAC[facility]    IR CVC TUNNELED  3/31/2023    IR CVC TUNNELED [facility]    IR CVC TUNNELED  6/9/2023    IR CVC TUNNELED 6/9/2023 [facility]    IR CVC TUNNELED  7/20/2023    IR CVC TUNNELED 7/20/2023 [facility]    IR CVC TUNNELED  8/16/2023    IR CVC TUNNELED 8/16/2023 [facility]    IR CVC TUNNELED  9/21/2023    IR CVC TUNNELED 9/21/2023 [facility]    MR HEAD ANGIO WO IV CONTRAST  8/16/2014    MR HEAD ANGIO WO IV CONTRAST 8/16/2014 [facility]CILLARY LEGACY    MR NECK ANGIO WO IV CONTRAST  8/16/2014    MR NECK ANGIO WO IV CONTRAST 8/16/2014 [facility]CILLARY LEGACY    OTHER SURGICAL HISTORY  05/13/2014    Fluid Drained, Retina Inspected: Breaks Supported By Buckle    OTHER SURGICAL HISTORY  10/09/2014    Closed Treatment Of Fracture Of The Lateral Malleolus    OTHER SURGICAL HISTORY  06/09/2014    Salpingo-oophorectomy Right Side    US GUIDED BIOPSY LYMPH NODE SUPERFICIAL  9/17/2019    US GUIDED BIOPSY LYMPH NODE SUPERFICIAL 9/17/2019 [facility] INPATIENT LEGACY       Allergies:  No Known Allergies    Social History:    Social History     Socioeconomic History    Marital status: Single     Spouse name: Not on file    Number of children: Not on file    Years of education: Not on file    Highest education level: Not on file   Occupational History    Not on file   Tobacco Use    Smoking status: Former     Current packs/day: 0.00     Types: Cigarettes     Quit date: 2014     Years since quitting: 10.7     Passive exposure: Past    Smokeless tobacco: Never   Substance and Sexual Activity    Alcohol use: Not Currently    Drug use: Not Currently    Sexual activity: Not on file   Other Topics Concern    Not on file   Social History Narrative    Not on file     Social Determinants of Health     Financial Resource Strain: Patient Unable To Answer (9/11/2024)    Overall Financial Resource Strain (CARDIA)     Difficulty of Paying Living Expenses: Patient unable to answer   Food  Insecurity: Unknown (9/10/2024)    Received from [facility], [facility]    Hunger Vital Sign     Worried About Running Out of Food in the Last Year: Never true     Ran Out of Food in the Last Year: Not on file   Transportation Needs: Patient Unable To Answer (9/11/2024)    PRAPARE - Transportation     Lack of Transportation (Medical): Patient unable to answer     Lack of Transportation (Non-Medical): Patient unable to answer   Physical Activity: Patient Declined (10/26/2023)    Exercise Vital Sign     Days of Exercise per Week: Patient declined     Minutes of Exercise per Session: Patient declined   Stress: Not on file   Social Connections: Not on file   Intimate Partner Violence: Not on file   Housing Stability: Patient Unable To Answer (9/11/2024)    Housing Stability Vital Sign     Unable to Pay for Housing in the Last Year: Patient unable to answer     Number of Times Moved in the Last Year: 0     Homeless in the Last Year: Patient unable to answer       Family History:    Family History   Problem Relation Name Age of Onset    Diabetes Father      Gout Father      Sickle cell trait Father      Seizures Sister      Diabetes Other      Uterine cancer Other      Other (congenital blindness) Cousin         EXAM     Vitals:    Vitals:    09/11/24 1155 09/11/24 1200 09/11/24 1208 09/11/24 1215   BP: 93/53   93/53   Pulse: 91 89  91   Resp:  24 24 24   Temp: 36.5 °C (97.7 °F)   36.5 °C (97.7 °F)   TempSrc: Temporal      SpO2:  100% 100% 100%   Weight:       Height:         Failed to redirect to the Timeline version of the [employer].    Intake/Output Summary (Last 24 hours) at 9/11/2024 1236  Last data filed at 9/11/2024 1215  Gross per 24 hour   Intake 5984.47 ml   Output 2663 ml   Net 3321.47 ml         Physical Exam  General: intubated and sedated  HEENT: PERRLA, positive scleral icterus, moist MM  Respiratory: mechanical breath sounds  Cardiovascular: RRR  Abdomen: Soft, no fluid wave  appreciated  Extremities: trace edema  Neuro: sedated but able to follow commands on exam     OBJECTIVE                                                                              Medications       Current Facility-Administered Medications:     albuterol 2.5 mg /3 mL (0.083 %) nebulizer solution 2.5 mg, 2.5 mg, nebulization, q6h PRN, sAter Puga MD    alteplase (Cathflo Activase) injection 2 mg, 2 mg, intra-catheter, PRN, Mandy Tijerina MD    calcium carbonate (Tums) chewable tablet 1,500 mg, 1,500 mg, oral, q5 min PRN, Yomaira Reed MD    calcium gluconate 2 g in 100 mL infusion, 25 mL/hr, intravenous, Continuous, Evelina Clarke MD, Stopped at 09/11/24 1211    dextrose 50 % injection 12.5 g, 12.5 g, intravenous, q15 min PRN, Aster Puga MD, 12.5 g at 09/11/24 0815    dextrose 50 % injection 25 g, 25 g, intravenous, q15 min PRN, Aster Puga MD, 25 g at 09/10/24 2304    diphenhydrAMINE (BENADryl) injection 25 mg, 25 mg, intravenous, q5 min PRN, Yomaira Reed MD    fentanyl (Sublimaze) 1000 mcg in sodium chloride 0.9% 100 mL (10 mcg/mL) infusion (premix), 0-300 mcg/hr, intravenous, Continuous, Aster Puga MD, Last Rate: 5 mL/hr at 09/11/24 0807, 50 mcg/hr at 09/11/24 0807    fentaNYL bolus from bag 25 mcg, 25 mcg, intravenous, Once, Aster Puga MD    fentaNYL bolus from bag 25 mcg, 25 mcg, intravenous, q10 min PRN, Aster Puga MD    folic acid (Folvite) tablet 1 mg, 1 mg, oral, Daily, Aster Puga MD, 1 mg at 09/11/24 0934    glucagon (Glucagen) injection 1 mg, 1 mg, intramuscular, q15 min PRN, Aster Puga MD    glucagon (Glucagen) injection 1 mg, 1 mg, intramuscular, q15 min PRN, Aster Puga MD    norepinephrine (Levophed) 8 mg in dextrose 5% 250 mL (0.032 mg/mL) infusion (premix), 0.01-1 mcg/kg/min, intravenous, Continuous, Aster Puga MD, Last Rate: 19.5 mL/hr at 09/11/24 1227, 0.1 mcg/kg/min at 09/11/24 1227    oxygen (O2) therapy, , inhalation, Continuous PRN -  O2/gases, Marcellus Carlson MD, 40 percent at 09/11/24 0045    pantoprazole (ProtoNix) injection 40 mg, 40 mg, intravenous, Daily, Aster Puga MD, 40 mg at 09/11/24 0934    perflutren protein A microsphere (Optison) injection 0.5 mL, 0.5 mL, intravenous, Once in imaging, Edy Lemon MD    perflutren protein A microsphere (Optison) injection 0.5 mL, 0.5 mL, intravenous, Once in imaging, Aster Puga MD    piperacillin-tazobactam (Zosyn) 2.25 g in dextrose (iso) IV 50 mL, 2.25 g, intravenous, q6h, Mandy Tijerina MD, Stopped at 09/11/24 1210    polyethylene glycol (Glycolax, Miralax) packet 17 g, 17 g, oral, Daily, Aster Puga MD, 17 g at 09/11/24 0934    propofol (Diprivan) infusion, 5-50 mcg/kg/min, intravenous, Continuous, Aster Puga MD, Last Rate: 31.2 mL/hr at 09/11/24 1155, 50 mcg/kg/min at 09/11/24 1155    sennosides (Senokot) tablet 8.6 mg, 1 tablet, oral, BID, Aster Puga MD, 8.6 mg at 09/11/24 0934    sodium chloride 0.9 % bolus 500 mL, 500 mL, intravenous, Once PRN, Yomaira Reed MD    sodium chloride 0.9% flush 10 mL, 10 mL, intra-catheter, Once, Yomaira Reed MD    sodium chloride 0.9% flush 10 mL, 10 mL, intra-catheter, Once, Yomaira Reed MD    sodium zirconium cyclosilicate (Lokelma) packet 10 g, 10 g, oral, TID, Aster Puga MD, 10 g at 09/11/24 0454    sulfur hexafluoride microsphr (Lumason) injection 24.28 mg, 2 mL, intravenous, Once in imaging, Edy Lemon MD    sulfur hexafluoride microsphr (Lumason) injection 24.28 mg, 2 mL, intravenous, Once in imaging, Aster Puga MD    [START ON 9/14/2024] thiamine (Vitamin B1) injection 100 mg, 100 mg, intravenous, Daily, Aster Puga MD    thiamine 500 mg in dextrose 5% 100 mL IV, 500 mg, intravenous, TID, Aster Puga MD, Stopped at 09/11/24 1110    vancomycin (Vancocin) pharmacy to dose - pharmacy monitoring, , miscellaneous, Daily PRN, Aster Puga MD    vasopressin (Vasostrict) 0.2 unit/mL in 5%  dextrose 100 mL IV, 0.03 Units/min, intravenous, Continuous, Aster Puga MD, Last Rate: 9 mL/hr at 09/11/24 1036, 0.03 Units/min at 09/11/24 1036                                                                            Labs     Results for orders placed or performed during the hospital encounter of 09/10/24 (from the past 24 hour(s))   CBC and Auto Differential   Result Value Ref Range    WBC 24.8 (H) 4.4 - 11.3 x10*3/uL    nRBC 209.8 (H) 0.0 - 0.0 /100 WBCs    RBC 2.22 (L) 4.00 - 5.20 x10*6/uL    Hemoglobin 6.6 (L) 12.0 - 16.0 g/dL    Hematocrit 17.7 (L) 36.0 - 46.0 %    MCV 80 80 - 100 fL    MCH 29.7 26.0 - 34.0 pg    MCHC 37.3 (H) 32.0 - 36.0 g/dL    RDW 30.9 (H) 11.5 - 14.5 %    Platelets 111 (L) 150 - 450 x10*3/uL    Immature Granulocytes %, Automated 2.3 (H) 0.0 - 0.9 %    Immature Granulocytes Absolute, Automated 0.56 0.00 - 0.70 x10*3/uL   Magnesium   Result Value Ref Range    Magnesium 2.57 (H) 1.60 - 2.40 mg/dL   Type and screen   Result Value Ref Range    ABO TYPE AB     Rh TYPE POS     ANTIBODY SCREEN NEG    Hepatic function panel   Result Value Ref Range    Albumin 3.0 (L) 3.4 - 5.0 g/dL    Bilirubin, Total 10.2 (H) 0.0 - 1.2 mg/dL    Bilirubin, Direct 3.1 (H) 0.0 - 0.3 mg/dL    Alkaline Phosphatase 131 (H) 33 - 110 U/L    ALT 2,611 (H) 7 - 45 U/L    AST 4,727 (H) 9 - 39 U/L    Total Protein 5.5 (L) 6.4 - 8.2 g/dL   Coagulation Screen   Result Value Ref Range    Protime 33.7 (H) 9.8 - 12.8 seconds    INR 3.0 (H) 0.9 - 1.1    aPTT 29 27 - 38 seconds   Blood Gas Venous Full Panel   Result Value Ref Range    POCT pH, Venous 7.19 (LL) 7.33 - 7.43 pH    POCT pCO2, Venous 45 41 - 51 mm Hg    POCT pO2, Venous 44 35 - 45 mm Hg    POCT SO2, Venous 45 45 - 75 %    POCT Oxy Hemoglobin, Venous 44.1 (L) 45.0 - 75.0 %    POCT Hematocrit Calculated, Venous 22.0 (L) 36.0 - 46.0 %    POCT Sodium, Venous 131 (L) 136 - 145 mmol/L    POCT Potassium, Venous 6.8 (HH) 3.5 - 5.3 mmol/L    POCT Chloride, Venous 102 98 -  107 mmol/L    POCT Ionized Calicum, Venous 1.03 (L) 1.10 - 1.33 mmol/L    POCT Glucose, Venous 49 (LL) 74 - 99 mg/dL    POCT Lactate, Venous 9.5 (HH) 0.4 - 2.0 mmol/L    POCT Base Excess, Venous -10.2 (L) -2.0 - 3.0 mmol/L    POCT HCO3 Calculated, Venous 17.2 (L) 22.0 - 26.0 mmol/L    POCT Hemoglobin, Venous 7.3 (L) 12.0 - 16.0 g/dL    POCT Anion Gap, Venous 19.0 10.0 - 25.0 mmol/L    Patient Temperature 37.0 degrees Celsius    FiO2 21 %    Flow 2.0 LPM   Phosphorus   Result Value Ref Range    Phosphorus 7.4 (H) 2.5 - 4.9 mg/dL   Basic Metabolic Panel   Result Value Ref Range    Glucose 53 (LL) 74 - 99 mg/dL    Sodium 134 (L) 136 - 145 mmol/L    Potassium 6.3 (HH) 3.5 - 5.3 mmol/L    Chloride 97 (L) 98 - 107 mmol/L    Bicarbonate 17 (L) 21 - 32 mmol/L    Anion Gap 26 (H) 10 - 20 mmol/L    Urea Nitrogen 64 (H) 6 - 23 mg/dL    Creatinine 3.32 (H) 0.50 - 1.05 mg/dL    eGFR 16 (L) >60 mL/min/1.73m*2    Calcium 8.0 (L) 8.6 - 10.6 mg/dL   Troponin I, High Sensitivity   Result Value Ref Range    Troponin I, High Sensitivity ([facility]) 1,315 (HH) 0 - 34 ng/L   Lactate dehydrogenase   Result Value Ref Range    LDH >12,000 (H) 84 - 246 U/L   Hemoglobin Identification with Path Review   Result Value Ref Range    Hemoglobin F      Hemoglobin S 47.1 (H) <=0.0 %    Hemoglobin C 48.8 (H) <=0.0 %    Hemoglobin A2      Hemoglobin Identification Interpretation      Pathologist Review-Hemoglobin Identification     Reticulocytes   Result Value Ref Range    Retic % 5.2 (H) 0.5 - 2.0 %    Retic Absolute 0.119 (H) 0.018 - 0.083 x10*6/uL    Reticulocyte Hemoglobin 37 28 - 38 pg    Immature Retic fraction 38.2 (H) <=16.0 %   Fibrinogen   Result Value Ref Range    Fibrinogen 318 200 - 400 mg/dL   Manual Differential   Result Value Ref Range    Neutrophils %, Manual 65.0 40.0 - 80.0 %    Bands %, Manual 24.0 0.0 - 5.0 %    Lymphocytes %, Manual 1.0 13.0 - 44.0 %    Monocytes %, Manual 3.0 2.0 - 10.0 %    Eosinophils %, Manual 0.0 0.0 - 6.0 %     Basophils %, Manual 0.0 0.0 - 2.0 %    Atypical Lymphocytes %, Manual 2.0 0.0 - 2.0 %    Metamyelocytes %, Manual 2.0 0.0 - 0.0 %    Myelocytes %, Manual 3.0 0.0 - 0.0 %    Seg Neutrophils Absolute, Manual 16.12 (H) 1.20 - 7.00 x10*3/uL    Bands Absolute, Manual 5.95 (H) 0.00 - 0.70 x10*3/uL    Lymphocytes Absolute, Manual 0.25 (L) 1.20 - 4.80 x10*3/uL    Monocytes Absolute, Manual 0.74 0.10 - 1.00 x10*3/uL    Eosinophils Absolute, Manual 0.00 0.00 - 0.70 x10*3/uL    Basophils Absolute, Manual 0.00 0.00 - 0.10 x10*3/uL    Atypical Lymphs Absolute, Manual 0.50 0.00 - 0.50 x10*3/uL    Metamyelocytes Absolute, Manual 0.50 0.00 - 0.00 x10*3/uL    Myelocytes Absolute, Manual 0.74 0.00 - 0.00 x10*3/uL    Total Cells Counted 100     Neutrophils Absolute, Manual 22.07 (H) 1.20 - 7.70 x10*3/uL    RBC Morphology See Below     Polychromasia Mild     RBC Fragments Few     Sickle Cells Few     Target Cells Many     [place] Bodies Present     Pappenheimer Bodies Present    POCT GLUCOSE   Result Value Ref Range    POCT Glucose 10 (L) 74 - 99 mg/dL   POCT GLUCOSE   Result Value Ref Range    POCT Glucose 166 (H) 74 - 99 mg/dL   Blood Gas Arterial Full Panel   Result Value Ref Range    POCT pH, Arterial 7.12 (LL) 7.38 - 7.42 pH    POCT pCO2, Arterial 48 (H) 38 - 42 mm Hg    POCT pO2, Arterial 110 (H) 85 - 95 mm Hg    POCT SO2, Arterial 94 94 - 100 %    POCT Oxy Hemoglobin, Arterial 90.4 (L) 94.0 - 98.0 %    POCT Hematocrit Calculated, Arterial 29.0 (L) 36.0 - 46.0 %    POCT Sodium, Arterial 129 (L) 136 - 145 mmol/L    POCT Potassium, Arterial 6.9 (HH) 3.5 - 5.3 mmol/L    POCT Chloride, Arterial 102 98 - 107 mmol/L    POCT Ionized Calcium, Arterial 1.02 (L) 1.10 - 1.33 mmol/L    POCT Glucose, Arterial 126 (H) 74 - 99 mg/dL    POCT Lactate, Arterial 9.6 (HH) 0.4 - 2.0 mmol/L    POCT Base Excess, Arterial -13.2 (L) -2.0 - 3.0 mmol/L    POCT HCO3 Calculated, Arterial 15.6 (L) 22.0 - 26.0 mmol/L    POCT Hemoglobin, Arterial 9.6 (L)  12.0 - 16.0 g/dL    POCT Anion Gap, Arterial 18 10 - 25 mmo/L    Patient Temperature 37.0 degrees Celsius    FiO2 0 %   Haptoglobin   Result Value Ref Range    Haptoglobin <30 (L) 30 - 200 mg/dL   Blood Culture    Specimen: Peripheral Venipuncture; Blood culture   Result Value Ref Range    Blood Culture Loaded on Instrument - Culture in progress    Blood Culture    Specimen: Peripheral Venipuncture; Blood culture   Result Value Ref Range    Blood Culture Loaded on Instrument - Culture in progress    Hepatitis panel, acute   Result Value Ref Range    Hepatitis B Surface AG Nonreactive Nonreactive    Hepatitis A  AB- IgM Nonreactive Nonreactive    Hepatitis B Core AB; IgM Nonreactive Nonreactive    Hepatitis C AB Nonreactive Nonreactive   CALCIUM, IONIZED   Result Value Ref Range    POCT Calcium, Ionized 1.00 (L) 1.1 - 1.33 mmol/L   Troponin I, High Sensitivity   Result Value Ref Range    Troponin I, High Sensitivity (CMC) 1,431 (HH) 0 - 34 ng/L   BLOOD GAS MIXED VENOUS   Result Value Ref Range    POCT pH, Mixed 7.10 (LL) 7.33 - 7.43 pH    POCT pCO2, Mixed 53 (H) 41 - 51 mm Hg    POCT pO2, Mixed 56 (H) 35 - 45 mm Hg    POCT SO2, Mixed 61 45 - 75 %    POCT Oxy Hemoglobin, Mixed 59.6 45.0 - 75.0 %    POCT Base Excess, Mixed -13.3 (L) -2.0 - 3.0 mmol/L    POCT HCO3 Calculated, Mixed 16.5 (L) 22.0 - 26.0 mmol/L    Patient Temperature 37.0 degrees Celsius    FiO2 60 %   Prepare RBC: 1 Units, Hgb S Negative, Leukocytes Reduced (CMV reduced risk), Irradiated   Result Value Ref Range    PRODUCT CODE [id number]     Unit Number [id number]     Unit ABO O     Unit RH POS     XM INTEP COMP     Dispense Status TR     Blood Expiration Date 9/28/2024 11:59:00 PM EDT     PRODUCT BLOOD TYPE 5100     UNIT VOLUME 350    POCT GLUCOSE   Result Value Ref Range    POCT Glucose 67 (L) 74 - 99 mg/dL   Urinalysis with Reflex Culture and Microscopic   Result Value Ref Range    Color, Urine Yellow Light-Yellow, Yellow, Dark-Yellow     Appearance, Urine Turbid (N) Clear    Specific Gravity, Urine 1.013 1.005 - 1.035    pH, Urine 5.5 5.0, 5.5, 6.0, 6.5, 7.0, 7.5, 8.0    Protein, Urine 70 (1+) (A) NEGATIVE, 10 (TRACE), 20 (TRACE) mg/dL    Glucose, Urine Normal Normal mg/dL    Blood, Urine OVER (3+) (A) NEGATIVE    Ketones, Urine TRACE (A) NEGATIVE mg/dL    Bilirubin, Urine NEGATIVE NEGATIVE    Urobilinogen, Urine 3 (1+) (A) Normal mg/dL    Nitrite, Urine NEGATIVE NEGATIVE    Leukocyte Esterase, Urine 500 Cande/µL (A) NEGATIVE   Microscopic Only, Urine   Result Value Ref Range    WBC, Urine >50 (A) 1-5, NONE /HPF    RBC, Urine 6-10 (A) NONE, 1-2, 3-5 /HPF    Squamous Epithelial Cells, Urine 1-9 (SPARSE) Reference range not established. /HPF    Mucus, Urine FEW Reference range not established. /LPF   Prepare RBC: 1 Units, Irradiated, Leukocytes Reduced (CMV reduced risk), Hgb S Negative   Result Value Ref Range    PRODUCT CODE Q1898R71     Unit Number [id number]     Unit ABO O     Unit RH POS     XM INTEP COMP     Dispense Status TR     Blood Expiration Date 9/26/2024 11:59:00 PM EDT     PRODUCT BLOOD TYPE 5100     UNIT VOLUME 295    Blood Gas Arterial Full Panel   Result Value Ref Range    POCT pH, Arterial 7.25 (LL) 7.38 - 7.42 pH    POCT pCO2, Arterial 42 38 - 42 mm Hg    POCT pO2, Arterial 100 (H) 85 - 95 mm Hg    POCT SO2, Arterial 96 94 - 100 %    POCT Oxy Hemoglobin, Arterial 92.4 (L) 94.0 - 98.0 %    POCT Hematocrit Calculated, Arterial 19.0 (L) 36.0 - 46.0 %    POCT Sodium, Arterial 128 (L) 136 - 145 mmol/L    POCT Potassium, Arterial 5.8 (H) 3.5 - 5.3 mmol/L    POCT Chloride, Arterial 99 98 - 107 mmol/L    POCT Ionized Calcium, Arterial 1.11 1.10 - 1.33 mmol/L    POCT Glucose, Arterial 265 (H) 74 - 99 mg/dL    POCT Lactate, Arterial 7.3 (HH) 0.4 - 2.0 mmol/L    POCT Base Excess, Arterial -8.1 (L) -2.0 - 3.0 mmol/L    POCT HCO3 Calculated, Arterial 18.4 (L) 22.0 - 26.0 mmol/L    POCT Hemoglobin, Arterial 6.4 (LL) 12.0 - 16.0 g/dL    POCT  Anion Gap, Arterial 16 10 - 25 mmo/L    Patient Temperature 37.0 degrees Celsius    FiO2 40 %   Renal function panel   Result Value Ref Range    Glucose 254 (H) 74 - 99 mg/dL    Sodium 131 (L) 136 - 145 mmol/L    Potassium 5.6 (H) 3.5 - 5.3 mmol/L    Chloride 97 (L) 98 - 107 mmol/L    Bicarbonate 18 (L) 21 - 32 mmol/L    Anion Gap 22 (H) 10 - 20 mmol/L    Urea Nitrogen 67 (H) 6 - 23 mg/dL    Creatinine 3.55 (H) 0.50 - 1.05 mg/dL    eGFR 15 (L) >60 mL/min/1.73m*2    Calcium 7.4 (L) 8.6 - 10.6 mg/dL    Phosphorus 6.7 (H) 2.5 - 4.9 mg/dL    Albumin 2.3 (L) 3.4 - 5.0 g/dL   CALCIUM, IONIZED   Result Value Ref Range    POCT Calcium, Ionized 1.06 (L) 1.1 - 1.33 mmol/L   Troponin I, High Sensitivity   Result Value Ref Range    Troponin I, High Sensitivity (CMC) 1,186 (HH) 0 - 34 ng/L   Direct Antiglobulin Test   Result Value Ref Range    NORA-POLYSPECIFIC NEG    CBC and Auto Differential   Result Value Ref Range    WBC 16.7 (H) 4.4 - 11.3 x10*3/uL    nRBC 299.1 (H) 0.0 - 0.0 /100 WBCs    RBC 1.98 (L) 4.00 - 5.20 x10*6/uL    Hemoglobin 6.0 (LL) 12.0 - 16.0 g/dL    Hematocrit 15.7 (L) 36.0 - 46.0 %    MCV 79 (L) 80 - 100 fL    MCH 30.3 26.0 - 34.0 pg    MCHC 38.2 (H) 32.0 - 36.0 g/dL    RDW 31.0 (H) 11.5 - 14.5 %    Platelets 123 (L) 150 - 450 x10*3/uL    Immature Granulocytes %, Automated 1.1 (H) 0.0 - 0.9 %    Immature Granulocytes Absolute, Automated 0.18 0.00 - 0.70 x10*3/uL   Manual Differential   Result Value Ref Range    Neutrophils %, Manual 30.0 40.0 - 80.0 %    Bands %, Manual 47.0 0.0 - 5.0 %    Lymphocytes %, Manual 3.0 13.0 - 44.0 %    Monocytes %, Manual 6.0 2.0 - 10.0 %    Eosinophils %, Manual 0.0 0.0 - 6.0 %    Basophils %, Manual 0.0 0.0 - 2.0 %    Metamyelocytes %, Manual 8.0 0.0 - 0.0 %    Myelocytes %, Manual 6.0 0.0 - 0.0 %    Seg Neutrophils Absolute, Manual 5.01 1.20 - 7.00 x10*3/uL    Bands Absolute, Manual 7.85 (H) 0.00 - 0.70 x10*3/uL    Lymphocytes Absolute, Manual 0.50 (L) 1.20 - 4.80 x10*3/uL     Monocytes Absolute, Manual 1.00 0.10 - 1.00 x10*3/uL    Eosinophils Absolute, Manual 0.00 0.00 - 0.70 x10*3/uL    Basophils Absolute, Manual 0.00 0.00 - 0.10 x10*3/uL    Metamyelocytes Absolute, Manual 1.34 0.00 - 0.00 x10*3/uL    Myelocytes Absolute, Manual 1.00 0.00 - 0.00 x10*3/uL    Total Cells Counted 100     Neutrophils Absolute, Manual 12.86 (H) 1.20 - 7.70 x10*3/uL    RBC Morphology See Below     Polychromasia Mild     RBC Fragments Many     Sickle Cells Few     Target Cells Many     Pappenheimer Bodies Present    Salicylate   Result Value Ref Range    Salicylate  3 4 - 20 mg/dL   POCT GLUCOSE   Result Value Ref Range    POCT Glucose 237 (H) 74 - 99 mg/dL   Respiratory Culture/Smear    Specimen: SPUTUM; Fluid   Result Value Ref Range    Gram Stain       Gram stain indicates specimen consists of lower respiratory tract secretions.    Gram Stain No predominant organism    Transthoracic Echo (TTE) Limited   Result Value Ref Range    AV pk rhett 1.69 m/s    AV mn grad 5.0 mmHg    LVOT diam 2.20 cm    MV E/A ratio 1.54     Tricuspid annular plane systolic excursion 1.9 cm    LV EF 68 %    LVIDd 4.15 cm    Aortic Valve Area by Continuity of VTI 4.42 cm2    Aortic Valve Area by Continuity of Peak Velocity 2.99 cm2    AV pk grad 11.4 mmHg    LV A4C EF 71.4    Prepare RBC: 6 Units, Hgb S Negative, Leukocytes Reduced (CMV reduced risk)   Result Value Ref Range    PRODUCT CODE T6701D09     Unit Number [id number]     Unit ABO O     Unit RH NEG     XM INTEP COMP     Dispense Status TR     Blood Expiration Date 10/10/2024 11:59:00 PM EDT     PRODUCT BLOOD TYPE 9500     UNIT VOLUME 287     PRODUCT CODE L1705Y73     Unit Number [id number]     Unit ABO O     Unit RH NEG     XM INTEP COMP     Dispense Status TR     Blood Expiration Date 10/7/2024 11:59:00 PM EDT     PRODUCT BLOOD TYPE 9500     UNIT VOLUME 350     PRODUCT CODE W5372G75     Unit Number [id number]     Unit ABO O     Unit RH POS     XM INTEP COMP      Dispense Status TR     Blood Expiration Date 10/5/2024 11:59:00 PM EDT     PRODUCT BLOOD TYPE 5100     UNIT VOLUME 350     PRODUCT CODE V1101G33     Unit Number [id number]     Unit ABO O     Unit RH POS     XM INTEP COMP     Dispense Status TR     Blood Expiration Date 10/4/2024 11:59:00 PM EDT     PRODUCT BLOOD TYPE 5100     UNIT VOLUME 350     PRODUCT CODE Q8139C87     Unit Number [id number]     Unit ABO O     Unit RH POS     XM INTEP COMP     Dispense Status TR     Blood Expiration Date 10/3/2024 11:59:00 PM EDT     PRODUCT BLOOD TYPE 5100     UNIT VOLUME 350     PRODUCT CODE K5852I08     Unit Number [id number]     Unit ABO O     Unit RH POS     XM INTEP COMP     Dispense Status TR     Blood Expiration Date 10/3/2024 11:59:00 PM EDT     PRODUCT BLOOD TYPE 5100     UNIT VOLUME 350    POCT GLUCOSE   Result Value Ref Range    POCT Glucose 117 (H) 74 - 99 mg/dL   POCT GLUCOSE   Result Value Ref Range    POCT Glucose 151 (H) 74 - 99 mg/dL   Blood Gas Arterial Full Panel   Result Value Ref Range    POCT pH, Arterial 7.36 (L) 7.38 - 7.42 pH    POCT pCO2, Arterial 32 (L) 38 - 42 mm Hg    POCT pO2, Arterial 109 (H) 85 - 95 mm Hg    POCT SO2, Arterial 99 94 - 100 %    POCT Oxy Hemoglobin, Arterial 94.3 94.0 - 98.0 %    POCT Hematocrit Calculated, Arterial 30.0 (L) 36.0 - 46.0 %    POCT Sodium, Arterial 128 (L) 136 - 145 mmol/L    POCT Potassium, Arterial 5.3 3.5 - 5.3 mmol/L    POCT Chloride, Arterial 99 98 - 107 mmol/L    POCT Ionized Calcium, Arterial 1.09 (L) 1.10 - 1.33 mmol/L    POCT Glucose, Arterial 129 (H) 74 - 99 mg/dL    POCT Lactate, Arterial 6.8 (HH) 0.4 - 2.0 mmol/L    POCT Base Excess, Arterial -6.5 (L) -2.0 - 3.0 mmol/L    POCT HCO3 Calculated, Arterial 18.1 (L) 22.0 - 26.0 mmol/L    POCT Hemoglobin, Arterial 9.9 (L) 12.0 - 16.0 g/dL    POCT Anion Gap, Arterial 16 10 - 25 mmo/L    Patient Temperature 37.0 degrees Celsius    FiO2 30 %   Renal function panel   Result Value Ref Range    Glucose  120 (H) 74 - 99 mg/dL    Sodium 132 (L) 136 - 145 mmol/L    Potassium 5.0 3.5 - 5.3 mmol/L    Chloride 95 (L) 98 - 107 mmol/L    Bicarbonate 19 (L) 21 - 32 mmol/L    Anion Gap 23 (H) 10 - 20 mmol/L    Urea Nitrogen 66 (H) 6 - 23 mg/dL    Creatinine 3.76 (H) 0.50 - 1.05 mg/dL    eGFR 14 (L) >60 mL/min/1.73m*2    Calcium 7.8 (L) 8.6 - 10.6 mg/dL    Phosphorus 5.3 (H) 2.5 - 4.9 mg/dL    Albumin 2.8 (L) 3.4 - 5.0 g/dL   Lactate dehydrogenase   Result Value Ref Range    LDH >12,000 (H) 84 - 246 U/L   Magnesium   Result Value Ref Range    Magnesium 2.25 1.60 - 2.40 mg/dL   CBC and Auto Differential   Result Value Ref Range    WBC 16.5 (H) 4.4 - 11.3 x10*3/uL    nRBC 378.3 (H) 0.0 - 0.0 /100 WBCs    RBC 3.16 (L) 4.00 - 5.20 x10*6/uL    Hemoglobin 9.4 (L) 12.0 - 16.0 g/dL    Hematocrit 26.0 (L) 36.0 - 46.0 %    MCV 82 80 - 100 fL    MCH 29.7 26.0 - 34.0 pg    MCHC 36.2 (H) 32.0 - 36.0 g/dL    RDW 24.6 (H) 11.5 - 14.5 %    Platelets 108 (L) 150 - 450 x10*3/uL    Immature Granulocytes %, Automated 1.2 (H) 0.0 - 0.9 %    Immature Granulocytes Absolute, Automated 0.19 0.00 - 0.70 x10*3/uL   CALCIUM, IONIZED   Result Value Ref Range    POCT Calcium, Ionized 1.02 (L) 1.1 - 1.33 mmol/L   Reticulocytes   Result Value Ref Range    Retic % 33.0 (H) 0.5 - 2.0 %    Retic Absolute      Reticulocyte Hemoglobin 26 (L) 28 - 38 pg    Immature Retic fraction 11.0 <=16.0 %   Hepatic function panel   Result Value Ref Range    Albumin 2.8 (L) 3.4 - 5.0 g/dL    Bilirubin, Total 10.7 (H) 0.0 - 1.2 mg/dL    Bilirubin, Direct 3.7 (H) 0.0 - 0.3 mg/dL    Alkaline Phosphatase 209 (H) 33 - 110 U/L    ALT 4,119 (H) 7 - 45 U/L    AST >10,000 (H) 9 - 39 U/L    Total Protein 4.9 (L) 6.4 - 8.2 g/dL   Manual Differential   Result Value Ref Range    Neutrophils %, Manual 48.4 40.0 - 80.0 %    Bands %, Manual 42.2 0.0 - 5.0 %    Lymphocytes %, Manual 4.7 13.0 - 44.0 %    Monocytes %, Manual 4.7 2.0 - 10.0 %    Eosinophils %, Manual 0.0 0.0 - 6.0 %    Basophils  %, Manual 0.0 0.0 - 2.0 %    Seg Neutrophils Absolute, Manual 7.99 (H) 1.20 - 7.00 x10*3/uL    Bands Absolute, Manual 6.96 (H) 0.00 - 0.70 x10*3/uL    Lymphocytes Absolute, Manual 0.78 (L) 1.20 - 4.80 x10*3/uL    Monocytes Absolute, Manual 0.78 0.10 - 1.00 x10*3/uL    Eosinophils Absolute, Manual 0.00 0.00 - 0.70 x10*3/uL    Basophils Absolute, Manual 0.00 0.00 - 0.10 x10*3/uL    Total Cells Counted 64     Neutrophils Absolute, Manual 14.95 (H) 1.20 - 7.70 x10*3/uL    RBC Morphology See Below     Polychromasia Mild     RBC Fragments Few     Target Cells Few     Teardrop Cells Few     Sandra Cells Few     Acanthocytes Few     Basophilic Stippling Present     [place] Bodies Present     Pappenheimer Bodies Present    B-type natriuretic peptide   Result Value Ref Range    BNP 1,512 (H) 0 - 99 pg/mL   Acetaminophen   Result Value Ref Range    Acetaminophen <10.0 10.0 - 30.0 ug/mL   Procalcitonin   Result Value Ref Range    Procalcitonin 7.09 (H) <=0.07 ng/mL   Coagulation Screen   Result Value Ref Range    Protime 33.8 (H) 9.8 - 12.8 seconds    INR 3.0 (H) 0.9 - 1.1    aPTT 29 27 - 38 seconds   Renal function panel   Result Value Ref Range    Glucose 76 74 - 99 mg/dL    Sodium 134 (L) 136 - 145 mmol/L    Potassium 4.6 3.5 - 5.3 mmol/L    Chloride 97 (L) 98 - 107 mmol/L    Bicarbonate 22 21 - 32 mmol/L    Anion Gap 20 10 - 20 mmol/L    Urea Nitrogen 69 (H) 6 - 23 mg/dL    Creatinine 3.97 (H) 0.50 - 1.05 mg/dL    eGFR 13 (L) >60 mL/min/1.73m*2    Calcium 8.5 (L) 8.6 - 10.6 mg/dL    Phosphorus 4.9 2.5 - 4.9 mg/dL    Albumin 2.8 (L) 3.4 - 5.0 g/dL   POCT GLUCOSE   Result Value Ref Range    POCT Glucose 47 (L) 74 - 99 mg/dL   POCT GLUCOSE   Result Value Ref Range    POCT Glucose 95 74 - 99 mg/dL   Transthoracic Echo (TTE) Complete   Result Value Ref Range    AV pk rhett 1.66 m/s    AV mn grad 6.0 mmHg    LVOT diam 2.00 cm    MV E/A ratio 1.02     Tricuspid annular plane systolic excursion 1.3 cm    LA vol index A/L 10.1  ml/m2    LV EF 63 %    RV free wall pk S' 9.36 cm/s    LVIDd 3.70 cm    Aortic Valve Area by Continuity of VTI 2.55 cm2    Aortic Valve Area by Continuity of Peak Velocity 2.73 cm2    AV pk grad 11.0 mmHg    LV A4C EF 78.2    POCT GLUCOSE   Result Value Ref Range    POCT Glucose <10 (L) 74 - 99 mg/dL   POCT GLUCOSE   Result Value Ref Range    POCT Glucose 98 74 - 99 mg/dL   Coagulation Screen   Result Value Ref Range    Protime 33.3 (H) 9.8 - 12.8 seconds    INR 2.9 (H) 0.9 - 1.1    aPTT 28 27 - 38 seconds   Acute Toxicology Panel, Blood   Result Value Ref Range    Acetaminophen 10.1 10.0 - 30.0 ug/mL    Salicylate  <3 4 - 20 mg/dL    Alcohol <10 <=10 mg/dL   POCT GLUCOSE   Result Value Ref Range    POCT Glucose 121 (H) 74 - 99 mg/dL     *Note: Due to a large number of results and/or encounters for the requested time period, some results have not been displayed. A complete set of results can be found in Results Review.                                                                              Imaging           === 09/10/24 ===    CT CHEST ABDOMEN PELVIS W IV CONTRAST    - Impression -  1. Heterogeneous mottled mosaic pattern of enhancement is present  within the hepatic parenchyma, which can be seen in the setting of  hepatic venous congestion.  2. Diffuse mosaic attenuation of the lung parenchyma, which can be  seen in the setting of small airway disease, pulmonary edema or acute  infection.  3. Additional findings as above.                                                                               GI Procedures     none           ASSESSMENT / PLAN                  ASSESSMENT/PLAN:  Senait Narvaez is a 54 y.o. 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 Hepatology is consulted for Elevated LFTs.    Her LFTs were elevated from prior on initial ED presentation 9/10 likely iso vaso-occlusive crisis, she then developed shock with marked hypotension BP 69/54 requiring pressor support and since then her LFTs have continued to rise. Workup including Acute hepatitis panel negative, acetaminophen level 10.1, alcohol <10, salicylate <3, .  CT AP noted mottled mosaic pattern of enhancement is present within the hepatic parenchyma, which can be seen in the setting of hepatic venous congestion and patient does have newly reduced RV function. Currently suspect her LFTs are elevated as a result of multiple factors including ischemic hepatitis (shock liver), vaso-occlusive crisis, and hepatic congestion from possible new HF.     #Ischemic Hepatitis   #Elevated LFTs   Recommendations   -please obtain liver ultrasound with dopplers   -avoid hepatotoxic drugs   -maintain adequate BP with MAP >65   -trend daily hepatic function panel   -further management of shock per primary team     Patient was seen& discussed with Dr. Coffman    Thank you for the consultation. Hepatology will continue to the follow.  - During weekday hours of 7am- 5pm, please do not hesitate to contact me on [employer] Chat or page [phone] if there are any further questions   - After hours, on weekends, and on holidays, please page the [place] GI fellow at [phone]. Thank you.       Steffi Telles MD  PGY4 Gastroenterology Fellow  [facility]

## 2024-09-11 NOTE — CONSULTS
Vancomycin Dosing by Pharmacy- INITIAL    Senait Narvaez is a 54 y.o. year old female who Pharmacy has been consulted for vancomycin dosing for other bloodstream infection . Based on the patient's indication and renal status this patient will be dosed based on a goal trough/random level of 15-20.     Renal function is currently declining.    There were no vitals taken for this visit.     Lab Results   Component Value Date    CREATININE 3.32 (H) 09/10/2024    CREATININE 0.94 2024    CREATININE 0.88 2024    CREATININE 1.05 2024        Patient weight is as follows: There were no vitals filed for this visit.    Cultures:  No results found for the encounter in last 14 days.        No intake/output data recorded.  I/O during current shift:  No intake/output data recorded.    Temp (24hrs), Av.5 °C (97.7 °F), Min:36.5 °C (97.7 °F), Max:36.5 °C (97.7 °F)         Assessment/Plan     Patient will be given a loading dose of 2000 mg.  Will not initiate vancomycin maintenance due to current CHARLES. Dosing by level at this time.   Follow-up level will be ordered on 24 at 2100 unless clinically indicated sooner.  Will continue to monitor renal function daily while on vancomycin and order serum creatinine at least every 48 hours if not already ordered.  Follow for continued vancomycin needs, clinical response, and signs/symptoms of toxicity.       Elaine Montez, PharmD

## 2024-09-11 NOTE — POST-PROCEDURE NOTE
This is a 54 y.o. female with sickle cell pain crisis and multiorgan dysfunction who underwent automated red blood cell exchange (RCE) with 6 RBC units with estimated 42% FCR and target hematocrit 27%.     I saw and evaluated the patient during the RCE.  The patient was resting in bed.  Vascular access functioned well.    WBC   Date/Time Value Ref Range Status   09/11/2024 05:04 AM 16.5 (H) 4.4 - 11.3 x10*3/uL Final     Hemoglobin   Date/Time Value Ref Range Status   09/11/2024 05:04 AM 9.4 (L) 12.0 - 16.0 g/dL Final     Hematocrit   Date/Time Value Ref Range Status   09/11/2024 05:04 AM 26.0 (L) 36.0 - 46.0 % Final     Platelets   Date/Time Value Ref Range Status   09/11/2024 05:04  (L) 150 - 450 x10*3/uL Final        POCT Calcium, Ionized   Date/Time Value Ref Range Status   09/11/2024 05:04 AM 1.02 (L) 1.1 - 1.33 mmol/L Final     Comment:     The performance characteristics of ionized calcium tested  in heparinized plasma or serum have been validated by the  individual  laboratory site where testing is performed.   Testing on heparinized plasma or serum is not approved by   the FDA; however, such approval is not necessary.        Hemoglobin S   Date/Time Value Ref Range Status   06/10/2024 10:11 AM 42.2 (H) <=0.0 % Final        Ferritin   Date/Time Value Ref Range Status   05/08/2024 09:18 AM 31 8 - 150 ng/mL Final        Pre-procedure vital signs at 1005:  T: 36.7 C, HR: 91, RR: 24, BP: 95/48  Pulse oximetry: 99% on ventilator    Post-procedure vital signs at 1215:  T: 36.5 C, HR: 91, RR: 24, BP: 93/53  Pulse oximetry: 100% on ventilator    Outcome: RCE completed.  Adverse Reaction: No    Assessment and Plan:  54 y.o. female with  who underwent RCE.    Draw post-procedure labs  Vascular access to be managed by the clinical team.  No further RCE planned for this admission    Raphael Baer MD, PhD  PGY-2, Transfusion Medicine

## 2024-09-11 NOTE — PROGRESS NOTES
SOCIAL WORK NOTE      09/11/24 1612   Discharge Planning   Living Arrangements Alone   Support Systems Family members;Children   Assistance Needed independent per family   Type of Residence Private residence   Home or Post Acute Services None   Expected Discharge Disposition   (TBD)   Financial Resource Strain   How hard is it for you to pay for the very basics like food, housing, medical care, and heating? Not hard   Housing Stability   In the last 12 months, was there a time when you were not able to pay the mortgage or rent on time? N   In the past 12 months, how many times have you moved where you were living? 0   At any time in the past 12 months, were you homeless or living in a shelter (including now)? N   Transportation Needs   In the past 12 months, has lack of transportation kept you from medical appointments or from getting medications? no   In the past 12 months, has lack of transportation kept you from meetings, work, or from getting things needed for daily living? No     NOK: mother and daughter listed. Daughter Tiesha is HCPOA per scanning in 2018, page 11 of documents notes healthcare   DME: denied, but mother requested wheelchair   Home Care: none  HD: denied  PCP:  per mother, but provider name unknown   Additional information: Patient currently intubated. SW spoke with family at bedside (mother, aunt, niece, and friend) for assessment (please see flowsheets for further details). Patient normally lives alone and is independent at baseline. Mother reports difficulties getting patient up recently and reports need for wheelchair. No reported issues with SDOH. Social work to follow.  VIRIDIANA Woodard, TEVIN-S ([id number])

## 2024-09-11 NOTE — POST-PROCEDURE NOTE
Senait Manns Choice [id number]       Procedure type: Red cell Exchange    Replacement Fluids:  6 units of PRBC used for procedure (RBCX)     Procedure Completed Without complications.    Attending notified of completion status. See flow sheet(s) for additional details.  Post-Vitals listed below.    Post-procedure vitals:  Vitals @ 1215  BP: 93/53  Temp: 36.5 °C (97.7 °F)  Temp Source: Temporal [Temporal]  Heart Rate: 91  Resp: 24  SpO2: 100 %        Ramil Reyes, RN

## 2024-09-11 NOTE — CONSULTS
NEPHROLOGY NEW CONSULT NOTE   Senait Narvaez   54 y.o.    @WT@  MRN/Room: [id number]    Reason for consult: CHARLES on CKD    HPI:  Senait Narvaez is a 54 y.o. 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     Patient presented due to diffuse body pain that started on 9/10, rated 7/10, similar to previous episodes, mainly in her legs, but also stating chest pain. No triggers identified. With hypotension initially requiring 2 pressors, CHARLES, acute liver injury 2/2 congestive hepatopathy, hemolytic anemia requiring transfusions. Also altered mental status with lethargy requiring intubation and mechanical ventilation. Patient initiated today exchange transfusion.     Nephrology consulted for CHARLES on CKD, to consider possibility of dialysis.     Baseline creatinine is 0.8-1  Creatinine at the time of admission was 3.32 and started trending up from 3.32 and now up to 4.07  Blood pressure remained low requiring 2 pressors support during hospital course.   Urine output was last 24hrs 0.3 ml/kg/hr  Recent contrast studies on contrast on 9/10 with CT scan  Recent nephrotoxic medication use (vanco/vanco+zosyn, amphotericin B, gentamicin, chemo drugs, NsAIDS): zosyn  Recent ACE/ARB/diuretic use: No    Fever, skin rash or eosinophilia on CBC: No   Vasooclussive crisis due to SCD  Recent changes in urine output: Oliguria  frequency/foamy urine, dysuria, Incomplete bladder emptying, dark/bloody urine, new back pain: No  Swelling in leg, weight gain/loss, SOB/orthopnea/PND: No  Prior history of NSAID use: No  Prior history of renal stones: No reported  Herbal remedies no, PPI No  Smoking  and drug use HX:  "No  Hx of vascular disease (CVA, MI, TIA, PVD): No  Hx of cancers: No    In The ER: BP 93/53   Pulse 95   Temp 36.5 °C (97.7 °F) (Temporal)   Resp 24   Ht 1.651 m (5' 5\")   Wt 110 kg (242 lb 8.1 oz)   SpO2 100%   BMI 40.36 kg/m²      Past Medical History:   Diagnosis Date    Asthma (HHS-HCC)     CHF (congestive heart failure) (Multi)     Chronic pain disorder     Compression of vein 02/19/2020    Superior vena cava syndrome    Hypotension, unspecified 12/10/2020      Past Surgical History:   Procedure Laterality Date    APPENDECTOMY  08/05/2013    Appendectomy    CHOLECYSTECTOMY  08/05/2013    Cholecystectomy    CT ANGIO NECK  10/19/2022    CT NECK ANGIO W AND WO IV CONTRAST 10/19/2022 [facility] CLINICAL LEGACY    CT GUIDED PERCUTANEOUS BIOPSY LYMPH NODE SUPERFICIAL  9/19/2019    CT GUIDED PERCUTANEOUS BIOPSY LYMPH NODE SUPERFICIAL 9[place]19/[place] [facility] [place]       Social History     Socioeconomic History    Marital status: Single     Spouse name: Not on file    Number of children: Not on file    Years of education: Not on file    Highest education level: Not on file   Occupational History    Not on file   Tobacco Use    Smoking status: Former     Current packs/day: 0.00     Types: Cigarettes     Quit date: 2014     Years since quitting: 10.7     Passive exposure: Past    Smokeless tobacco: Never   Substance and Sexual Activity    Alcohol use: Not Currently    Drug use: Not Currently    Sexual activity: Not on file   Other Topics Concern    Not on file   Social History Narrative    Not on file     No Known Allergies     Medications Prior to Admission   Medication Sig Dispense Refill Last Dose    albuterol 90 mcg/actuation inhaler Inhale 2 puffs every 6 hours if needed for wheezing. (Patient not taking: Reported on 9/11/2024) 18 g 11 Not Taking    ascorbic acid (Vitamin C) 500 mg chewable tablet Chew 1 tablet (500 mg) once daily. As needed       benzonatate (Tessalon) 100 mg capsule Take 1 capsule (100 mg) by " mouth 2 times a day as needed for cough. Do not crush or chew.       cyclobenzaprine (Flexeril) 10 mg tablet Take 1 tablet (10 mg) by mouth 3 times a day as needed for muscle spasms. 30 tablet 3     fluticasone (Flonase) 50 mcg/actuation nasal spray Administer 2 sprays into each nostril if needed.       folic acid (Folvite) 1 mg tablet Take 1 tablet (1 mg) by mouth once daily. 90 tablet 3     furosemide (Lasix) 20 mg tablet Take 1 tablet (20 mg) by mouth every other day. 45 tablet 3     metoprolol tartrate (Lopressor) 25 mg tablet Take 0.5 tablets (12.5 mg) by mouth 2 times a day. 90 tablet 3     naloxone (Narcan) 4 mg/0.1 mL nasal spray Administer 1 spray (4 mg) into affected nostril(s) if needed for opioid reversal. May repeat every 2-3 minutes if needed, alternating nostrils, until medical assistance becomes available. 2 each 0     ondansetron (Zofran) 4 mg tablet Take 1 tablet (4 mg) by mouth every 8 hours if needed for nausea or vomiting. 20 tablet 2     oxyCODONE (Roxicodone) 10 mg immediate release tablet Take 1 tablet (10 mg) by mouth every 4 hours if needed for severe pain (7 - 10) (pain). 75 tablet 0     oxygen (O2) gas therapy Inhale 1 each continuously. 1 each 0     sennosides-docusate sodium (Nita-Colace) 8.6-50 mg tablet Take 2 tablets by mouth every 12 hours.       warfarin (Coumadin) 5 mg tablet Take 1 tablet (5 mg) by mouth once daily in the evening. 30 tablet 3       Vitals:    09/11/24 1500   BP:    Pulse: 95   Resp: 24   Temp:    SpO2: 100%      09/09 1900 - 09/11 0659  In: 3762.2 [I.V.:312.2]  Out: 460 [Urine:460]   Weight change:      General appearance: No distress. On mechanical ventilation.   Skin: no apparent rash  Heart: regular rhythm. No rub  Lungs: Mild ronchi bilaterally. No wheezing/crackles  Abdomen: soft, no distended  Extremities: No edema bilat  :  Slater catheter in placed  Neuro: Sedation on mechanical ventilation     Blood Labs:  Results for orders placed or performed during  the hospital encounter of 09/10/24 (from the past 24 hour(s))   CBC and Auto Differential   Result Value Ref Range    WBC 24.8 (H) 4.4 - 11.3 x10*3/uL    nRBC 209.8 (H) 0.0 - 0.0 /100 WBCs    RBC 2.22 (L) 4.00 - 5.20 x10*6/uL    Hemoglobin 6.6 (L) 12.0 - 16.0 g/dL    Hematocrit 17.7 (L) 36.0 - 46.0 %    MCV 80 80 - 100 fL    MCH 29.7 26.0 - 34.0 pg    MCHC 37.3 (H) 32.0 - 36.0 g/dL    RDW 30.9 (H) 11.5 - 14.5 %    Platelets 111 (L) 150 - 450 x10*3/uL    Immature Granulocytes %, Automated 2.3 (H) 0.0 - 0.9 %    Immature Granulocytes Absolute, Automated 0.56 0.00 - 0.70 x10*3/uL   Magnesium   Result Value Ref Range    Magnesium 2.57 (H) 1.60 - 2.40 mg/dL   Type and screen   Result Value Ref Range    ABO TYPE AB     Rh TYPE POS     ANTIBODY SCREEN NEG    Hepatic function panel   Result Value Ref Range    Albumin 3.0 (L) 3.4 - 5.0 g/dL    Bilirubin, Total 10.2 (H) 0.0 - 1.2 mg/dL    Bilirubin, Direct 3.1 (H) 0.0 - 0.3 mg/dL    Alkaline Phosphatase 131 (H) 33 - 110 U/L    ALT 2,611 (H) 7 - 45 U/L    AST 4,727 (H) 9 - 39 U/L    Total Protein 5.5 (L) 6.4 - 8.2 g/dL   Coagulation Screen   Result Value Ref Range    Protime 33.7 (H) 9.8 - 12.8 seconds    INR 3.0 (H) 0.9 - 1.1    aPTT 29 27 - 38 seconds   Blood Gas Venous Full Panel   Result Value Ref Range    POCT pH, Venous 7.19 (LL) 7.33 - 7.43 pH    POCT pCO2, Venous 45 41 - 51 mm Hg    POCT pO2, Venous 44 35 - 45 mm Hg    POCT SO2, Venous 45 45 - 75 %    POCT Oxy Hemoglobin, Venous 44.1 (L) 45.0 - 75.0 %    POCT Hematocrit Calculated, Venous 22.0 (L) 36.0 - 46.0 %    POCT Sodium, Venous 131 (L) 136 - 145 mmol/L    POCT Potassium, Venous 6.8 (HH) 3.5 - 5.3 mmol/L    POCT Chloride, Venous 102 98 - 107 mmol/L    POCT Ionized Calicum, Venous 1.03 (L) 1.10 - 1.33 mmol/L    POCT Glucose, Venous 49 (LL) 74 - 99 mg/dL    POCT Lactate, Venous 9.5 (HH) 0.4 - 2.0 mmol/L    POCT Base Excess, Venous -10.2 (L) -2.0 - 3.0 mmol/L    POCT HCO3 Calculated, Venous 17.2 (L) 22.0 - 26.0  mmol/L    POCT Hemoglobin, Venous 7.3 (L) 12.0 - 16.0 g/dL    POCT Anion Gap, Venous 19.0 10.0 - 25.0 mmol/L    Patient Temperature 37.0 degrees Celsius    FiO2 21 %    Flow 2.0 LPM   Phosphorus   Result Value Ref Range    Phosphorus 7.4 (H) 2.5 - 4.9 mg/dL   Basic Metabolic Panel   Result Value Ref Range    Glucose 53 (LL) 74 - 99 mg/dL    Sodium 134 (L) 136 - 145 mmol/L    Potassium 6.3 (HH) 3.5 - 5.3 mmol/L    Chloride 97 (L) 98 - 107 mmol/L    Bicarbonate 17 (L) 21 - 32 mmol/L    Anion Gap 26 (H) 10 - 20 mmol/L    Urea Nitrogen 64 (H) 6 - 23 mg/dL    Creatinine 3.32 (H) 0.50 - 1.05 mg/dL    eGFR 16 (L) >60 mL/min/1.73m*2    Calcium 8.0 (L) 8.6 - 10.6 mg/dL   Troponin I, High Sensitivity   Result Value Ref Range    Troponin I, High Sensitivity (CMC) 1,315 (HH) 0 - 34 ng/L   Lactate dehydrogenase   Result Value Ref Range    LDH >12,000 (H) 84 - 246 U/L   Hemoglobin Identification with Path Review   Result Value Ref Range    Hemoglobin F      Hemoglobin S 47.1 (H) <=0.0 %    Hemoglobin C 48.8 (H) <=0.0 %    Hemoglobin A2      Hemoglobin Identification Interpretation      Pathologist Review-Hemoglobin Identification     Reticulocytes   Result Value Ref Range    Retic % 5.2 (H) 0.5 - 2.0 %    Retic Absolute 0.119 (H) 0.018 - 0.083 x10*6/uL    Reticulocyte Hemoglobin 37 28 - 38 pg    Immature Retic fraction 38.2 (H) <=16.0 %   Fibrinogen   Result Value Ref Range    Fibrinogen 318 200 - 400 mg/dL   Manual Differential   Result Value Ref Range    Neutrophils %, Manual 65.0 40.0 - 80.0 %    Bands %, Manual 24.0 0.0 - 5.0 %    Lymphocytes %, Manual 1.0 13.0 - 44.0 %    Monocytes %, Manual 3.0 2.0 - 10.0 %    Eosinophils %, Manual 0.0 0.0 - 6.0 %    Basophils %, Manual 0.0 0.0 - 2.0 %    Atypical Lymphocytes %, Manual 2.0 0.0 - 2.0 %    Metamyelocytes %, Manual 2.0 0.0 - 0.0 %    Myelocytes %, Manual 3.0 0.0 - 0.0 %    Seg Neutrophils Absolute, Manual 16.12 (H) 1.20 - 7.00 x10*3/uL    Bands Absolute, Manual 5.95 (H) 0.00 -  0.70 x10*3/uL    Lymphocytes Absolute, Manual 0.25 (L) 1.20 - 4.80 x10*3/uL    Monocytes Absolute, Manual 0.74 0.10 - 1.00 x10*3/uL    Eosinophils Absolute, Manual 0.00 0.00 - 0.70 x10*3/uL    Basophils Absolute, Manual 0.00 0.00 - 0.10 x10*3/uL    Atypical Lymphs Absolute, Manual 0.50 0.00 - 0.50 x10*3/uL    Metamyelocytes Absolute, Manual 0.50 0.00 - 0.00 x10*3/uL    Myelocytes Absolute, Manual 0.74 0.00 - 0.00 x10*3/uL    Total Cells Counted 100     Neutrophils Absolute, Manual 22.07 (H) 1.20 - 7.70 x10*3/uL    RBC Morphology See Below     Polychromasia Mild     RBC Fragments Few     Sickle Cells Few     Target Cells Many     Rhodes-[place] Bodies Present     Pappenheimer Bodies Present    POCT GLUCOSE   Result Value Ref Range    POCT Glucose 10 (L) 74 - 99 mg/dL   POCT GLUCOSE   Result Value Ref Range    POCT Glucose 166 (H) 74 - 99 mg/dL   Blood Gas Arterial Full Panel   Result Value Ref Range    POCT pH, Arterial 7.12 (LL) 7.38 - 7.42 pH    POCT pCO2, Arterial 48 (H) 38 - 42 mm Hg    POCT pO2, Arterial 110 (H) 85 - 95 mm Hg    POCT SO2, Arterial 94 94 - 100 %    POCT Oxy Hemoglobin, Arterial 90.4 (L) 94.0 - 98.0 %    POCT Hematocrit Calculated, Arterial 29.0 (L) 36.0 - 46.0 %    POCT Sodium, Arterial 129 (L) 136 - 145 mmol/L    POCT Potassium, Arterial 6.9 (HH) 3.5 - 5.3 mmol/L    POCT Chloride, Arterial 102 98 - 107 mmol/L    POCT Ionized Calcium, Arterial 1.02 (L) 1.10 - 1.33 mmol/L    POCT Glucose, Arterial 126 (H) 74 - 99 mg/dL    POCT Lactate, Arterial 9.6 (HH) 0.4 - 2.0 mmol/L    POCT Base Excess, Arterial -13.2 (L) -2.0 - 3.0 mmol/L    POCT HCO3 Calculated, Arterial 15.6 (L) 22.0 - 26.0 mmol/L    POCT Hemoglobin, Arterial 9.6 (L) 12.0 - 16.0 g/dL    POCT Anion Gap, Arterial 18 10 - 25 mmo/L    Patient Temperature 37.0 degrees Celsius    FiO2 0 %   Haptoglobin   Result Value Ref Range    Haptoglobin <30 (L) 30 - 200 mg/dL   Blood Culture    Specimen: Peripheral Venipuncture; Blood culture   Result Value  Ref Range    Blood Culture Loaded on Instrument - Culture in progress    Blood Culture    Specimen: Peripheral Venipuncture; Blood culture   Result Value Ref Range    Blood Culture Loaded on Instrument - Culture in progress    Hepatitis panel, acute   Result Value Ref Range    Hepatitis B Surface AG Nonreactive Nonreactive    Hepatitis A  AB- IgM Nonreactive Nonreactive    Hepatitis B Core AB; IgM Nonreactive Nonreactive    Hepatitis C AB Nonreactive Nonreactive   CALCIUM, IONIZED   Result Value Ref Range    POCT Calcium, Ionized 1.00 (L) 1.1 - 1.33 mmol/L   Troponin I, High Sensitivity   Result Value Ref Range    Troponin I, High Sensitivity (CMC) 1,431 (HH) 0 - 34 ng/L   BLOOD GAS MIXED VENOUS   Result Value Ref Range    POCT pH, Mixed 7.10 (LL) 7.33 - 7.43 pH    POCT pCO2, Mixed 53 (H) 41 - 51 mm Hg    POCT pO2, Mixed 56 (H) 35 - 45 mm Hg    POCT SO2, Mixed 61 45 - 75 %    POCT Oxy Hemoglobin, Mixed 59.6 45.0 - 75.0 %    POCT Base Excess, Mixed -13.3 (L) -2.0 - 3.0 mmol/L    POCT HCO3 Calculated, Mixed 16.5 (L) 22.0 - 26.0 mmol/L    Patient Temperature 37.0 degrees Celsius    FiO2 60 %   POCT GLUCOSE   Result Value Ref Range    POCT Glucose 67 (L) 74 - 99 mg/dL   Urinalysis with Reflex Culture and Microscopic   Result Value Ref Range    Color, Urine Yellow Light-Yellow, Yellow, Dark-Yellow    Appearance, Urine Turbid (N) Clear    Specific Gravity, Urine 1.013 1.005 - 1.035    pH, Urine 5.5 5.0, 5.5, 6.0, 6.5, 7.0, 7.5, 8.0    Protein, Urine 70 (1+) (A) NEGATIVE, 10 (TRACE), 20 (TRACE) mg/dL    Glucose, Urine Normal Normal mg/dL    Blood, Urine OVER (3+) (A) NEGATIVE    Ketones, Urine TRACE (A) NEGATIVE mg/dL    Bilirubin, Urine NEGATIVE NEGATIVE    Urobilinogen, Urine 3 (1+) (A) Normal mg/dL    Nitrite, Urine NEGATIVE NEGATIVE    Leukocyte Esterase, Urine 500 Cande/µL (A) NEGATIVE   Microscopic Only, Urine   Result Value Ref Range    WBC, Urine >50 (A) 1-5, NONE /HPF    RBC, Urine 6-10 (A) NONE, 1-2, 3-5 /HPF     Squamous Epithelial Cells, Urine 1-9 (SPARSE) Reference range not established. /HPF    Mucus, Urine FEW Reference range not established. /LPF   Urine electrolytes   Result Value Ref Range    Sodium, Urine Random 42 mmol/L    Sodium/Creatinine Ratio 47 Not established. mmol/g Creat    Potassium, Urine Random 66 mmol/L    Potassium/Creatinine Ratio 73 Not established mmol/g Creat    Chloride, Urine Random 19 mmol/L    Chloride/Creatinine Ratio 21 (L) 38 - 318 mmol/g creat    Creatinine, Urine Random 90.2 20.0 - 320.0 mg/dL   Result Value Ref Range    POCT pH, Arterial 7.25 (LL) 7.38 - 7.42 pH    POCT pCO2, Arterial 42 38 - 42 mm Hg    POCT pO2, Arterial 100 (H) 85 - 95 mm Hg    POCT SO2, Arterial 96 94 - 100 %    POCT Oxy Hemoglobin, Arterial 92.4 (L) 94.0 - 98.0 %    POCT Hematocrit Calculated, Arterial 19.0 (L) 36.0 - 46.0 %    POCT Sodium, Arterial 128 (L) 136 - 145 mmol/L    POCT Potassium, Arterial 5.8 (H) 3.5 - 5.3 mmol/L    POCT Chloride, Arterial 99 98 - 107 mmol/L    POCT Ionized Calcium, Arterial 1.11 1.10 - 1.33 mmol/L    POCT Glucose, Arterial 265 (H) 74 - 99 mg/dL    POCT Lactate, Arterial 7.3 (HH) 0.4 - 2.0 mmol/L    POCT Base Excess, Arterial -8.1 (L) -2.0 - 3.0 mmol/L    POCT HCO3 Calculated, Arterial 18.4 (L) 22.0 - 26.0 mmol/L    POCT Hemoglobin, Arterial 6.4 (LL) 12.0 - 16.0 g/dL    POCT Anion Gap, Arterial 16 10 - 25 mmo/L    Patient Temperature 37.0 degrees Celsius    FiO2 40 %   Renal function panel   Result Value Ref Range    Glucose 254 (H) 74 - 99 mg/dL    Sodium 131 (L) 136 - 145 mmol/L    Potassium 5.6 (H) 3.5 - 5.3 mmol/L    Chloride 97 (L) 98 - 107 mmol/L    Bicarbonate 18 (L) 21 - 32 mmol/L    Anion Gap 22 (H) 10 - 20 mmol/L    Urea Nitrogen 67 (H) 6 - 23 mg/dL    Creatinine 3.55 (H) 0.50 - 1.05 mg/dL    eGFR 15 (L) >60 mL/min/1.73m*2    Calcium 7.4 (L) 8.6 - 10.6 mg/dL    Phosphorus 6.7 (H) 2.5 - 4.9 mg/dL    Albumin 2.3 (L) 3.4 - 5.0 g/dL   CALCIUM, IONIZED   Result Value Ref Range     POCT Calcium, Ionized 1.06 (L) 1.1 - 1.33 mmol/L   Troponin I, High Sensitivity   Result Value Ref Range    Troponin I, High Sensitivity (CMC) 1,186 (HH) 0 - 34 ng/L   Direct Antiglobulin Test   Result Value Ref Range    NORA-POLYSPECIFIC NEG    CBC and Auto Differential   Result Value Ref Range    WBC 16.7 (H) 4.4 - 11.3 x10*3/uL    nRBC 299.1 (H) 0.0 - 0.0 /100 WBCs    RBC 1.98 (L) 4.00 - 5.20 x10*6/uL    Hemoglobin 6.0 (LL) 12.0 - 16.0 g/dL    Hematocrit 15.7 (L) 36.0 - 46.0 %    MCV 79 (L) 80 - 100 fL    MCH 30.3 26.0 - 34.0 pg    MCHC 38.2 (H) 32.0 - 36.0 g/dL    RDW 31.0 (H) 11.5 - 14.5 %    Platelets 123 (L) 150 - 450 x10*3/uL    Immature Granulocytes %, Automated 1.1 (H) 0.0 - 0.9 %    Immature Granulocytes Absolute, Automated 0.18 0.00 - 0.70 x10*3/uL   Manual Differential   Result Value Ref Range    Neutrophils %, Manual 30.0 40.0 - 80.0 %    Bands %, Manual 47.0 0.0 - 5.0 %    Lymphocytes %, Manual 3.0 13.0 - 44.0 %    Monocytes %, Manual 6.0 2.0 - 10.0 %    Eosinophils %, Manual 0.0 0.0 - 6.0 %    Basophils %, Manual 0.0 0.0 - 2.0 %    Metamyelocytes %, Manual 8.0 0.0 - 0.0 %    Myelocytes %, Manual 6.0 0.0 - 0.0 %    Seg Neutrophils Absolute, Manual 5.01 1.20 - 7.00 x10*3/uL    Bands Absolute, Manual 7.85 (H) 0.00 - 0.70 x10*3/uL    Lymphocytes Absolute, Manual 0.50 (L) 1.20 - 4.80 x10*3/uL    Monocytes Absolute, Manual 1.00 0.10 - 1.00 x10*3/uL    Eosinophils Absolute, Manual 0.00 0.00 - 0.70 x10*3/uL    Basophils Absolute, Manual 0.00 0.00 - 0.10 x10*3/uL    Metamyelocytes Absolute, Manual 1.34 0.00 - 0.00 x10*3/uL    Myelocytes Absolute, Manual 1.00 0.00 - 0.00 x10*3/uL    Total Cells Counted 100     Neutrophils Absolute, Manual 12.86 (H) 1.20 - 7.70 x10*3/uL    RBC Morphology See Below     Polychromasia Mild     RBC Fragments Many     Sickle Cells Few     Target Cells Many     Pappenheimer Bodies Present    Salicylate   Result Value Ref Range    Salicylate  3 4 - 20 mg/dL   POCT GLUCOSE   Result  Value Ref Range    POCT Glucose 237 (H) 74 - 99 mg/dL   Respiratory Culture/Smear    Specimen: SPUTUM; Fluid   Result Value Ref Range    Gram Stain       Gram stain indicates specimen consists of lower respiratory tract secretions.    Gram Stain No predominant organism    Transthoracic Echo (TTE) Limited   Result Value Ref Range    AV pk rhett 1.69 m/s    AV mn grad 5.0 mmHg    LVOT diam 2.20 cm    MV E/A ratio 1.54     Tricuspid annular plane systolic excursion 1.9 cm    LV EF 68 %    LVIDd 4.15 cm    Aortic Valve Area by Continuity of VTI 4.42 cm2    Aortic Valve Area by Continuity of Peak Velocity 2.99 cm2    AV pk grad 11.4 mmHg    LV A4C EF 71.4    Result Value Ref Range    POCT Glucose 117 (H) 74 - 99 mg/dL   POCT GLUCOSE   Result Value Ref Range    POCT Glucose 151 (H) 74 - 99 mg/dL   Blood Gas Arterial Full Panel   Result Value Ref Range    POCT pH, Arterial 7.36 (L) 7.38 - 7.42 pH    POCT pCO2, Arterial 32 (L) 38 - 42 mm Hg    POCT pO2, Arterial 109 (H) 85 - 95 mm Hg    POCT SO2, Arterial 99 94 - 100 %    POCT Oxy Hemoglobin, Arterial 94.3 94.0 - 98.0 %    POCT Hematocrit Calculated, Arterial 30.0 (L) 36.0 - 46.0 %    POCT Sodium, Arterial 128 (L) 136 - 145 mmol/L    POCT Potassium, Arterial 5.3 3.5 - 5.3 mmol/L    POCT Chloride, Arterial 99 98 - 107 mmol/L    POCT Ionized Calcium, Arterial 1.09 (L) 1.10 - 1.33 mmol/L    POCT Glucose, Arterial 129 (H) 74 - 99 mg/dL    POCT Lactate, Arterial 6.8 (HH) 0.4 - 2.0 mmol/L    POCT Base Excess, Arterial -6.5 (L) -2.0 - 3.0 mmol/L    POCT HCO3 Calculated, Arterial 18.1 (L) 22.0 - 26.0 mmol/L    POCT Hemoglobin, Arterial 9.9 (L) 12.0 - 16.0 g/dL    POCT Anion Gap, Arterial 16 10 - 25 mmo/L    Patient Temperature 37.0 degrees Celsius    FiO2 30 %   Renal function panel   Result Value Ref Range    Glucose 120 (H) 74 - 99 mg/dL    Sodium 132 (L) 136 - 145 mmol/L    Potassium 5.0 3.5 - 5.3 mmol/L    Chloride 95 (L) 98 - 107 mmol/L    Bicarbonate 19 (L) 21 - 32 mmol/L     Anion Gap 23 (H) 10 - 20 mmol/L    Urea Nitrogen 66 (H) 6 - 23 mg/dL    Creatinine 3.76 (H) 0.50 - 1.05 mg/dL    eGFR 14 (L) >60 mL/min/1.73m*2    Calcium 7.8 (L) 8.6 - 10.6 mg/dL    Phosphorus 5.3 (H) 2.5 - 4.9 mg/dL    Albumin 2.8 (L) 3.4 - 5.0 g/dL   Lactate dehydrogenase   Result Value Ref Range    LDH >12,000 (H) 84 - 246 U/L   Magnesium   Result Value Ref Range    Magnesium 2.25 1.60 - 2.40 mg/dL   CBC and Auto Differential   Result Value Ref Range    WBC 16.5 (H) 4.4 - 11.3 x10*3/uL    nRBC 378.3 (H) 0.0 - 0.0 /100 WBCs    RBC 3.16 (L) 4.00 - 5.20 x10*6/uL    Hemoglobin 9.4 (L) 12.0 - 16.0 g/dL    Hematocrit 26.0 (L) 36.0 - 46.0 %    MCV 82 80 - 100 fL    MCH 29.7 26.0 - 34.0 pg    MCHC 36.2 (H) 32.0 - 36.0 g/dL    RDW 24.6 (H) 11.5 - 14.5 %    Platelets 108 (L) 150 - 450 x10*3/uL    Immature Granulocytes %, Automated 1.2 (H) 0.0 - 0.9 %    Immature Granulocytes Absolute, Automated 0.19 0.00 - 0.70 x10*3/uL   CALCIUM, IONIZED   Result Value Ref Range    POCT Calcium, Ionized 1.02 (L) 1.1 - 1.33 mmol/L   Reticulocytes   Result Value Ref Range    Retic % 33.0 (H) 0.5 - 2.0 %    Retic Absolute      Reticulocyte Hemoglobin 26 (L) 28 - 38 pg    Immature Retic fraction 11.0 <=16.0 %   Hepatic function panel   Result Value Ref Range    Albumin 2.8 (L) 3.4 - 5.0 g/dL    Bilirubin, Total 10.7 (H) 0.0 - 1.2 mg/dL    Bilirubin, Direct 3.7 (H) 0.0 - 0.3 mg/dL    Alkaline Phosphatase 209 (H) 33 - 110 U/L    ALT 4,119 (H) 7 - 45 U/L    AST >10,000 (H) 9 - 39 U/L    Total Protein 4.9 (L) 6.4 - 8.2 g/dL   Manual Differential   Result Value Ref Range    Neutrophils %, Manual 48.4 40.0 - 80.0 %    Bands %, Manual 42.2 0.0 - 5.0 %    Lymphocytes %, Manual 4.7 13.0 - 44.0 %    Monocytes %, Manual 4.7 2.0 - 10.0 %    Eosinophils %, Manual 0.0 0.0 - 6.0 %    Basophils %, Manual 0.0 0.0 - 2.0 %    Seg Neutrophils Absolute, Manual 7.99 (H) 1.20 - 7.00 x10*3/uL    Bands Absolute, Manual 6.96 (H) 0.00 - 0.70 x10*3/uL    Lymphocytes  Absolute, Manual 0.78 (L) 1.20 - 4.80 x10*3/uL    Monocytes Absolute, Manual 0.78 0.10 - 1.00 x10*3/uL    Eosinophils Absolute, Manual 0.00 0.00 - 0.70 x10*3/uL    Basophils Absolute, Manual 0.00 0.00 - 0.10 x10*3/uL    Total Cells Counted 64     Neutrophils Absolute, Manual 14.95 (H) 1.20 - 7.70 x10*3/uL    RBC Morphology See Below     Polychromasia Mild     RBC Fragments Few     Target Cells Few     Teardrop Cells Few     Sandra Cells Few     Acanthocytes Few     Basophilic Stippling Present     Rhodes[place] Bodies Present     Pappenheimer Bodies Present    B-type natriuretic peptide   Result Value Ref Range    BNP 1,512 (H) 0 - 99 pg/mL   Acetaminophen   Result Value Ref Range    Acetaminophen <10.0 10.0 - 30.0 ug/mL   Procalcitonin   Result Value Ref Range    Procalcitonin 7.09 (H) <=0.07 ng/mL   Coagulation Screen   Result Value Ref Range    Protime 33.8 (H) 9.8 - 12.8 seconds    INR 3.0 (H) 0.9 - 1.1    aPTT 29 27 - 38 seconds   Renal function panel   Result Value Ref Range    Glucose 76 74 - 99 mg/dL    Sodium 134 (L) 136 - 145 mmol/L    Potassium 4.6 3.5 - 5.3 mmol/L    Chloride 97 (L) 98 - 107 mmol/L    Bicarbonate 22 21 - 32 mmol/L    Anion Gap 20 10 - 20 mmol/L    Urea Nitrogen 69 (H) 6 - 23 mg/dL    Creatinine 3.97 (H) 0.50 - 1.05 mg/dL    eGFR 13 (L) >60 mL/min/1.73m*2    Calcium 8.5 (L) 8.6 - 10.6 mg/dL    Phosphorus 4.9 2.5 - 4.9 mg/dL    Albumin 2.8 (L) 3.4 - 5.0 g/dL   POCT GLUCOSE   Result Value Ref Range    POCT Glucose 47 (L) 74 - 99 mg/dL   POCT GLUCOSE   Result Value Ref Range    POCT Glucose 95 74 - 99 mg/dL   Transthoracic Echo (TTE) Complete   Result Value Ref Range    AV pk rhett 1.66 m/s    AV mn grad 6.0 mmHg    LVOT diam 2.00 cm    MV E/A ratio 1.02     Tricuspid annular plane systolic excursion 1.3 cm    LA vol index A/L 10.1 ml/m2    LV EF 63 %    RV free wall pk S' 9.36 cm/s    LVIDd 3.70 cm    Aortic Valve Area by Continuity of VTI 2.55 cm2    Aortic Valve Area by Continuity of Peak  Velocity 2.73 cm2    AV pk grad 11.0 mmHg    LV A4C EF 78.2    POCT GLUCOSE   Result Value Ref Range    POCT Glucose <10 (L) 74 - 99 mg/dL   POCT GLUCOSE   Result Value Ref Range    POCT Glucose 98 74 - 99 mg/dL   Coagulation Screen   Result Value Ref Range    Protime 33.3 (H) 9.8 - 12.8 seconds    INR 2.9 (H) 0.9 - 1.1    aPTT 28 27 - 38 seconds   Acute Toxicology Panel, Blood   Result Value Ref Range    Acetaminophen 10.1 10.0 - 30.0 ug/mL    Salicylate  <3 4 - 20 mg/dL    Alcohol <10 <=10 mg/dL   EBV Screen (VCA IgG/IgM)   Result Value Ref Range    EBV VCA, IgG  Positive (A) Negative    EBV VCA, IgM  Negative Negative   Creatine Kinase   Result Value Ref Range    Creatine Kinase 944 (H) 0 - 215 U/L   EBV Early Antigen Antibody, IgG   Result Value Ref Range    EBV Early Antigen Antibody, IgG Negative Negative   Trish-Barr Virus Nuclear Antigen Antibody, IgG   Result Value Ref Range    EBV Nuclear Antigen Antibody, IgG Positive (A) Negative   POCT GLUCOSE   Result Value Ref Range    POCT Glucose 121 (H) 74 - 99 mg/dL   Cytomegalovirus IgG   Result Value Ref Range    Cytomegalovirus IgG Reactive (A) Nonreactive   Vitamin D 25-Hydroxy,Total (for eval of Vitamin D levels)   Result Value Ref Range    Vitamin D, 25-Hydroxy, Total 8 (L) 30 - 100 ng/mL   Renal function panel   Result Value Ref Range    Glucose 116 (H) 74 - 99 mg/dL    Sodium 132 (L) 136 - 145 mmol/L    Potassium 4.9 3.5 - 5.3 mmol/L    Chloride 96 (L) 98 - 107 mmol/L    Bicarbonate 17 (L) 21 - 32 mmol/L    Anion Gap 24 (H) 10 - 20 mmol/L    Urea Nitrogen 61 (H) 6 - 23 mg/dL    Creatinine 4.07 (H) 0.50 - 1.05 mg/dL    eGFR 12 (L) >60 mL/min/1.73m*2    Calcium 8.0 (L) 8.6 - 10.6 mg/dL    Phosphorus 4.7 2.5 - 4.9 mg/dL    Albumin 2.3 (L) 3.4 - 5.0 g/dL   CBC   Result Value Ref Range    WBC 10.1 4.4 - 11.3 x10*3/uL    nRBC 405.5 (H) 0.0 - 0.0 /100 WBCs    RBC 3.27 (L) 4.00 - 5.20 x10*6/uL    Hemoglobin 9.8 (L) 12.0 - 16.0 g/dL    Hematocrit 27.4 (L) 36.0 -  46.0 %    MCV 84 80 - 100 fL    MCH 30.0 26.0 - 34.0 pg    MCHC 35.8 32.0 - 36.0 g/dL    RDW 19.4 (H) 11.5 - 14.5 %    Platelets 58 (L) 150 - 450 x10*3/uL   Calcium, Ionized   Result Value Ref Range    POCT Calcium, Ionized 0.95 (L) 1.1 - 1.33 mmol/L   HEMOGLOBIN IDENTIFICATION WITH PATH REVIEW   Result Value Ref Range    Hemoglobin A      Hemoglobin F      Hemoglobin S 14.7 (H) <=0.0 %    Hemoglobin C 18.1 (H) <=0.0 %    Hemoglobin A2      Hemoglobin Identification Interpretation      Pathologist Review-Hemoglobin Identification       *Note: Due to a large number of results and/or encounters for the requested time period, some results have not been displayed. A complete set of results can be found in Results Review.     RENAL US 6/10/2024   RIGHT KIDNEY:  The right kidney measures 9.5 cm in length. The renal cortical  echogenicity and thickness are within normal limits. No  hydronephrosis is present; no evidence of nephrolithiasis.      LEFT KIDNEY:  The left kidney is not well visualized secondary to overlying gas and  poor penetration. Otherwise, the left kidney measures 9.7 cm in  length. The renal cortical echogenicity and thickness are within  normal limits. No hydronephrosis is present; no evidence of  nephrolithiasis within the limits of the current exam.      BLADDER:  The urinary bladder is unremarkable in appearance. Bilateral ureteral  jets are identified.      IMPRESSION:  Unremarkable renal ultrasound within the limits of the current exam.  No hydronephrosis or nephrolithiasis.    CT ABDOMEN SCAN 9/10/2024  KIDNEYS, URETERS, BLADDER: Normal in size, enhancing symmetrically.  Scattered simple renal cysts bilaterally. No hydronephrosis or  nephroureterolithiasis. Bladder is decompressed with Slater catheter  in place and scattered foci of gas. No abnormal bladder wall  thickening.    ASSESSMENT:  Senait Narvaez is a  54 y.o.  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     Patient with CHARLES on CKD, with several previous episodes of CHARLES secondary to SCD pain crisis. She was cece to recover from past episodes of CHARLES without requiring dialysis. Current azotemia, electrolytes and acid base status does not warrant dialysis at this time. Hyperkalemia has been corrected as well as acidosis, with last pH 7.44. We suggest to continue support measures for shock and management of underline cause of hemodynamic instability (vasooclusive crisis and/or septic shock). Nephrology will follow up closely.     #CHARLES stage 2 oliguric on CKD II (baseline eGFR  - Baseline creatinine 0.8-1, 4.07  #Severe hyperkalemia resolved  FeNa labs from 9/10 - 1.2%  - Creatinine 3.32 (9/10)--> 4.07 (9/11)  - Probably Pre-renal CHARLES due to shock secondary to vasooclussive crisis  - s/p IV contrast on 9/10 with CT scan  - UA: Protein 1+, leukocyte esterase 500 karina/uL, WBC >50, RBC 6-10    #Sickle cell crisis  - 4 pain crisis on current year  ##Hgb SC disease (previously on exchange transfusions q4-6 weeks, last transfusion 3/1/24)  ##Multiorgan failure   ##Hemolytic anemia  #Acute liver injury 2/2 hemodynamic instability versus sickle cell crisis   #Acute respiratory failure on Mechanical ventilation   #CAN (night oxygen use at home)  #pHTN (6/2023)  #Hx recurrent DVT/PE (on lifelong Coumadin)  #Hx of SVC syndrome    Kidney   - CHARLES on CKD, oliguric, Stage II  - Baseline creatinine: 0.8-1  - Etiology: Multifactorial. Shock, hypotension, vasooclussive crisis   - UA showed: RBC, Protein +1   - Urine electrolyes showed FeNA/FeUrea of  consistent  ATN (1-4) - Clinical volume status: Volume status managed by  primary team  - Electrolytes (Na, K, Ca, Phos): Hyperkalemia, corrected stable  - Acid base status: Metabolic acidosis, corrected stable  Hemodynamics  - Blood pressures remained stable/rapidly fluctuating/low need any pressor support during hospital course.   - On HTN meds? Any changes needed?  Avoid nephrotoxins  - Contrast, hyperglycemia, NSAIDs, ischemia (anemia)  Medications (causative)   - Example - Zosyn and vancomycin, NSAIDs, ACE/ARBs, etc.   Medications (adjustments)   - Medication adjustments based off current GFR  Obstruction   -  Renal US pending    Recommendations:  - Continue supportive care keeping blood pressure with MAP>70  - Volume status managed by primary team, diuresis as needed if hypervolemia  - Continue management of Hyperkalemia   - Indication for dialysis:  No   - Avoid nephrotoxins, contrast if possible  - strict Is/Os  - Renal dosing for medications for latest eGFR, follow medication trough as appropriate  - Avoid hypotension/rapid fluctuations in Bps  - No indication for RRT at present; but consent obtained in event it becomes necessary over coming days    Patient to be staffed with attending physician Dr. Dias. Discussed with Nephrology fellow Dr. Grace.   Plan preliminary until cosigned by attending physician.    Jeff Valdivia MD  PGY1 Internal Medicine

## 2024-09-11 NOTE — NURSING NOTE
Charles consulted for PIV placement by bedside RN. Charles RN to bedside to assess. Scanned patient's bilateral upper extremities via ultrasound, no vessels noted for IV cannulation. Alternative access recommended, bedside RN aware.

## 2024-09-11 NOTE — SIGNIFICANT EVENT
Patient has been identified as having an emergent need for administration of iodinated contrast for CT scan prior to result of laboratory studies OR despite known decreased GFR due to possibility of life and/or limb threatening pathology.     I acknowledge the risks and benefits of emergently proceeding with contrast administration including that, at present, it is the position of the [employer] that contrast induced nephropathy (DELFINA) is a rare but possible consequence. At this time the benefits of proceeding with contrast administration outweigh the risks.     Attempts will be made to mitigate possible DELFINA risk with IV fluid hydration if able.

## 2024-09-11 NOTE — CARE PLAN
Hematology Note    Asked by MICU re. Red cell exchange     54 year old female with Hb S.C. 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 No dyspnea.     Afebrile, on 2L oxygen, MAPs 50s; HR 50s     [facility]    Retic Count 1.8%, abs 0.043  WBC 30.4, Hb 7.4, Plt 91    Na 133, K 8, Cl 94, CO2 14, AG 25, BUN 55, Cr 3.05, Glu 38 Ca 8.2  , , TB 11.1     Lactate 9.5, VBG pH 7.19    CXR - no acute radiographic abnormality     [facility]  WBC 24.8, Hb 6.6, Plt 111  Cr 3.32, ALT 2611, AST 4727, TB 10.2, DB 3.1  LDH >12k  pH 7.12, lactate 9.6    There is clear evidence of multi-organ failure which may be secondary to a sickle cell vaso-occlusive crisis / sickle cell hepatopathy vs. Sepsis. Her functional asplenia would be a risk factor for immunosuppression. Her CXR does not suggest acute chest syndrome. Given the possible contribution of her sickle cell disease to her current critically ill state, we recommend urgent red cell exchange and continued supportive care.     Recommend:  - urgent red cell exchange, can give one or two units of RBCs a simple transfusion as a temporizing measure  - transfusion medicine contacted  - agree with pan-CT imaging  - continue broad-spectrum antibiotics, IVF  - trend CBC/diff, LDH, reticulocyte count   - follow up Hb electrophoresis     Formal Hematology consult to follow tomorrow. Discussed with Dr. Hill.     Mike Azevedo MD  Hematology Oncology PGY6

## 2024-09-12 ENCOUNTER — APPOINTMENT (OUTPATIENT)
Dept: CARDIOLOGY | Facility: HOSPITAL | Age: 54
DRG: 811 | End: 2024-09-12
Payer: COMMERCIAL

## 2024-09-12 ENCOUNTER — ANTICOAGULATION - WARFARIN VISIT (OUTPATIENT)
Dept: CARDIOLOGY | Facility: CLINIC | Age: 54
End: 2024-09-12
Payer: COMMERCIAL

## 2024-09-12 ENCOUNTER — APPOINTMENT (OUTPATIENT)
Dept: CARDIOLOGY | Facility: HOSPITAL | Age: 54
End: 2024-09-12
Payer: COMMERCIAL

## 2024-09-12 DIAGNOSIS — I26.99 RECURRENT PULMONARY EMBOLISM (MULTI): Primary | ICD-10-CM

## 2024-09-12 DIAGNOSIS — I82.4Z9 DEEP VEIN THROMBOSIS (DVT) OF DISTAL VEIN OF LOWER EXTREMITY, UNSPECIFIED CHRONICITY, UNSPECIFIED LATERALITY (MULTI): ICD-10-CM

## 2024-09-12 DIAGNOSIS — I82.210 THROMBOSIS OF SUPERIOR VENA CAVA (MULTI): ICD-10-CM

## 2024-09-12 DIAGNOSIS — I82.4Y9 DEEP VEIN THROMBOSIS (DVT) OF PROXIMAL LOWER EXTREMITY, UNSPECIFIED CHRONICITY, UNSPECIFIED LATERALITY (MULTI): ICD-10-CM

## 2024-09-12 LAB
ACANTHOCYTES BLD QL SMEAR: ABNORMAL
ALBUMIN SERPL BCP-MCNC: 2.4 G/DL (ref 3.4–5)
ALBUMIN SERPL BCP-MCNC: 2.5 G/DL (ref 3.4–5)
ALP SERPL-CCNC: 247 U/L (ref 33–110)
ALP SERPL-CCNC: 249 U/L (ref 33–110)
ALP SERPL-CCNC: 259 U/L (ref 33–110)
ALP SERPL-CCNC: 262 U/L (ref 33–110)
ALT SERPL W P-5'-P-CCNC: 1767 U/L (ref 7–45)
ALT SERPL W P-5'-P-CCNC: 1981 U/L (ref 7–45)
ALT SERPL W P-5'-P-CCNC: 2735 U/L (ref 7–45)
ALT SERPL W P-5'-P-CCNC: 2840 U/L (ref 7–45)
ANION GAP BLDV CALCULATED.4IONS-SCNC: 12 MMOL/L (ref 10–25)
ANION GAP SERPL CALC-SCNC: 17 MMOL/L (ref 10–20)
ANION GAP SERPL CALC-SCNC: 17 MMOL/L (ref 10–20)
ANION GAP SERPL CALC-SCNC: 18 MMOL/L (ref 10–20)
ANION GAP SERPL CALC-SCNC: 22 MMOL/L (ref 10–20)
APTT PPP: 30 SECONDS (ref 27–38)
APTT PPP: 30 SECONDS (ref 27–38)
APTT PPP: 31 SECONDS (ref 27–38)
APTT PPP: 34 SECONDS (ref 27–38)
APTT PPP: 34 SECONDS (ref 27–38)
AST SERPL W P-5'-P-CCNC: 1449 U/L (ref 9–39)
AST SERPL W P-5'-P-CCNC: 1957 U/L (ref 9–39)
AST SERPL W P-5'-P-CCNC: 3236 U/L (ref 9–39)
AST SERPL W P-5'-P-CCNC: 4126 U/L (ref 9–39)
BASE EXCESS BLDV CALC-SCNC: -1.8 MMOL/L (ref -2–3)
BASO STIPL BLD QL SMEAR: PRESENT
BASO STIPL BLD QL SMEAR: PRESENT
BASOPHILS # BLD MANUAL: 0 X10*3/UL (ref 0–0.1)
BASOPHILS # BLD MANUAL: 0 X10*3/UL (ref 0–0.1)
BASOPHILS NFR BLD MANUAL: 0 %
BASOPHILS NFR BLD MANUAL: 0 %
BILIRUB DIRECT SERPL-MCNC: 3.9 MG/DL (ref 0–0.3)
BILIRUB DIRECT SERPL-MCNC: 4.1 MG/DL (ref 0–0.3)
BILIRUB DIRECT SERPL-MCNC: 4.5 MG/DL (ref 0–0.3)
BILIRUB DIRECT SERPL-MCNC: 5 MG/DL (ref 0–0.3)
BILIRUB SERPL-MCNC: 8.4 MG/DL (ref 0–1.2)
BILIRUB SERPL-MCNC: 8.5 MG/DL (ref 0–1.2)
BILIRUB SERPL-MCNC: 9.2 MG/DL (ref 0–1.2)
BILIRUB SERPL-MCNC: 9.3 MG/DL (ref 0–1.2)
BODY TEMPERATURE: 37 DEGREES CELSIUS
BUN SERPL-MCNC: 66 MG/DL (ref 6–23)
BUN SERPL-MCNC: 68 MG/DL (ref 6–23)
BUN SERPL-MCNC: 70 MG/DL (ref 6–23)
BUN SERPL-MCNC: 70 MG/DL (ref 6–23)
BURR CELLS BLD QL SMEAR: ABNORMAL
CA-I BLDV-SCNC: 1.04 MMOL/L (ref 1.1–1.33)
CALCIUM SERPL-MCNC: 7.3 MG/DL (ref 8.6–10.6)
CALCIUM SERPL-MCNC: 7.5 MG/DL (ref 8.6–10.6)
CALCIUM SERPL-MCNC: 7.7 MG/DL (ref 8.6–10.6)
CALCIUM SERPL-MCNC: 7.7 MG/DL (ref 8.6–10.6)
CHLORIDE BLDV-SCNC: 94 MMOL/L (ref 98–107)
CHLORIDE SERPL-SCNC: 93 MMOL/L (ref 98–107)
CMV IGM SERPL-ACNC: <8 AU/ML
CO2 SERPL-SCNC: 21 MMOL/L (ref 21–32)
CO2 SERPL-SCNC: 24 MMOL/L (ref 21–32)
CO2 SERPL-SCNC: 24 MMOL/L (ref 21–32)
CO2 SERPL-SCNC: 25 MMOL/L (ref 21–32)
CREAT SERPL-MCNC: 4.82 MG/DL (ref 0.5–1.05)
CREAT SERPL-MCNC: 5.32 MG/DL (ref 0.5–1.05)
CREAT SERPL-MCNC: 5.34 MG/DL (ref 0.5–1.05)
CREAT SERPL-MCNC: 5.42 MG/DL (ref 0.5–1.05)
DACRYOCYTES BLD QL SMEAR: ABNORMAL
EGFRCR SERPLBLD CKD-EPI 2021: 10 ML/MIN/1.73M*2
EGFRCR SERPLBLD CKD-EPI 2021: 9 ML/MIN/1.73M*2
EOSINOPHIL # BLD MANUAL: 0 X10*3/UL (ref 0–0.7)
EOSINOPHIL # BLD MANUAL: 0 X10*3/UL (ref 0–0.7)
EOSINOPHIL NFR BLD MANUAL: 0 %
EOSINOPHIL NFR BLD MANUAL: 0 %
ERYTHROCYTE [DISTWIDTH] IN BLOOD BY AUTOMATED COUNT: 19.3 % (ref 11.5–14.5)
ERYTHROCYTE [DISTWIDTH] IN BLOOD BY AUTOMATED COUNT: 20 % (ref 11.5–14.5)
ERYTHROCYTE [DISTWIDTH] IN BLOOD BY AUTOMATED COUNT: 21.3 % (ref 11.5–14.5)
ERYTHROCYTE [DISTWIDTH] IN BLOOD BY AUTOMATED COUNT: 21.7 % (ref 11.5–14.5)
ERYTHROCYTE [DISTWIDTH] IN BLOOD BY AUTOMATED COUNT: 21.7 % (ref 11.5–14.5)
FIBRINOGEN PPP-MCNC: 310 MG/DL (ref 200–400)
GLUCOSE BLD MANUAL STRIP-MCNC: 106 MG/DL (ref 74–99)
GLUCOSE BLD MANUAL STRIP-MCNC: 109 MG/DL (ref 74–99)
GLUCOSE BLD MANUAL STRIP-MCNC: 111 MG/DL (ref 74–99)
GLUCOSE BLD MANUAL STRIP-MCNC: 129 MG/DL (ref 74–99)
GLUCOSE BLD MANUAL STRIP-MCNC: 99 MG/DL (ref 74–99)
GLUCOSE BLDV-MCNC: 122 MG/DL (ref 74–99)
GLUCOSE SERPL-MCNC: 104 MG/DL (ref 74–99)
GLUCOSE SERPL-MCNC: 107 MG/DL (ref 74–99)
GLUCOSE SERPL-MCNC: 109 MG/DL (ref 74–99)
GLUCOSE SERPL-MCNC: 99 MG/DL (ref 74–99)
HAPTOGLOB SERPL NEPH-MCNC: <30 MG/DL (ref 30–200)
HCO3 BLDV-SCNC: 23.1 MMOL/L (ref 22–26)
HCT VFR BLD AUTO: 25.6 % (ref 36–46)
HCT VFR BLD AUTO: 27.2 % (ref 36–46)
HCT VFR BLD AUTO: 27.2 % (ref 36–46)
HCT VFR BLD AUTO: 28 % (ref 36–46)
HCT VFR BLD AUTO: 28.3 % (ref 36–46)
HCT VFR BLD EST: 31 % (ref 36–46)
HEMOGLOBIN A2: 2.8 % (ref 2–3.5)
HEMOGLOBIN A2: 3.2 % (ref 2–3.5)
HEMOGLOBIN A: 63.9 % (ref 95.8–98)
HEMOGLOBIN C: 18.1 %
HEMOGLOBIN C: 48.8 %
HEMOGLOBIN F: 0.5 % (ref 0–2)
HEMOGLOBIN F: 0.9 % (ref 0–2)
HEMOGLOBIN IDENTIFICATION INTERPRETATION: ABNORMAL
HEMOGLOBIN IDENTIFICATION INTERPRETATION: ABNORMAL
HEMOGLOBIN S: 14.7 %
HEMOGLOBIN S: 47.1 %
HGB BLD-MCNC: 10.3 G/DL (ref 12–16)
HGB BLD-MCNC: 10.4 G/DL (ref 12–16)
HGB BLD-MCNC: 9.3 G/DL (ref 12–16)
HGB BLD-MCNC: 9.4 G/DL (ref 12–16)
HGB BLD-MCNC: 9.5 G/DL (ref 12–16)
HGB BLDV-MCNC: 10.4 G/DL (ref 12–16)
HGB RETIC QN: 30 PG (ref 28–38)
IMM GRANULOCYTES # BLD AUTO: 0.29 X10*3/UL (ref 0–0.7)
IMM GRANULOCYTES # BLD AUTO: 0.34 X10*3/UL (ref 0–0.7)
IMM GRANULOCYTES NFR BLD AUTO: 1.4 % (ref 0–0.9)
IMM GRANULOCYTES NFR BLD AUTO: 1.6 % (ref 0–0.9)
IMMATURE RETIC FRACTION: 18.1 %
INHALED O2 CONCENTRATION: 30 %
INR PPP: 1.7 (ref 0.9–1.1)
INR PPP: 1.8 (ref 0.9–1.1)
INR PPP: 2.1 (ref 0.9–1.1)
INR PPP: 2.4 (ref 0.9–1.1)
INR PPP: 2.5 (ref 0.9–1.1)
LACTATE BLDV-SCNC: 2.7 MMOL/L (ref 0.4–2)
LDH SERPL L TO P-CCNC: 4233 U/L (ref 84–246)
LEGIONELLA AG UR QL: NEGATIVE
LYMPHOCYTES # BLD MANUAL: 0 X10*3/UL (ref 1.2–4.8)
LYMPHOCYTES # BLD MANUAL: 1.29 X10*3/UL (ref 1.2–4.8)
LYMPHOCYTES NFR BLD MANUAL: 0 %
LYMPHOCYTES NFR BLD MANUAL: 6.4 %
MAGNESIUM SERPL-MCNC: 1.89 MG/DL (ref 1.6–2.4)
MAGNESIUM SERPL-MCNC: 1.96 MG/DL (ref 1.6–2.4)
MAGNESIUM SERPL-MCNC: 1.97 MG/DL (ref 1.6–2.4)
MAGNESIUM SERPL-MCNC: 2.03 MG/DL (ref 1.6–2.4)
MCH RBC QN AUTO: 29.1 PG (ref 26–34)
MCH RBC QN AUTO: 29.5 PG (ref 26–34)
MCH RBC QN AUTO: 29.6 PG (ref 26–34)
MCH RBC QN AUTO: 29.7 PG (ref 26–34)
MCH RBC QN AUTO: 29.9 PG (ref 26–34)
MCHC RBC AUTO-ENTMCNC: 34.6 G/DL (ref 32–36)
MCHC RBC AUTO-ENTMCNC: 34.9 G/DL (ref 32–36)
MCHC RBC AUTO-ENTMCNC: 36.3 G/DL (ref 32–36)
MCHC RBC AUTO-ENTMCNC: 36.7 G/DL (ref 32–36)
MCHC RBC AUTO-ENTMCNC: 36.8 G/DL (ref 32–36)
MCV RBC AUTO: 81 FL (ref 80–100)
MCV RBC AUTO: 81 FL (ref 80–100)
MCV RBC AUTO: 82 FL (ref 80–100)
MCV RBC AUTO: 83 FL (ref 80–100)
MCV RBC AUTO: 85 FL (ref 80–100)
METAMYELOCYTES # BLD MANUAL: 0.89 X10*3/UL
METAMYELOCYTES # BLD MANUAL: 2.14 X10*3/UL
METAMYELOCYTES NFR BLD MANUAL: 10.6 %
METAMYELOCYTES NFR BLD MANUAL: 4.1 %
MONOCYTES # BLD MANUAL: 0 X10*3/UL (ref 0.1–1)
MONOCYTES # BLD MANUAL: 1.32 X10*3/UL (ref 0.1–1)
MONOCYTES NFR BLD MANUAL: 0 %
MONOCYTES NFR BLD MANUAL: 6.1 %
MYELOCYTES # BLD MANUAL: 1.32 X10*3/UL
MYELOCYTES NFR BLD MANUAL: 6.1 %
NEUTROPHILS # BLD MANUAL: 16.35 X10*3/UL (ref 1.2–7.7)
NEUTROPHILS # BLD MANUAL: 18.08 X10*3/UL (ref 1.2–7.7)
NEUTS BAND # BLD MANUAL: 10.58 X10*3/UL (ref 0–0.7)
NEUTS BAND # BLD MANUAL: 2.59 X10*3/UL (ref 0–0.7)
NEUTS BAND NFR BLD MANUAL: 12.8 %
NEUTS BAND NFR BLD MANUAL: 49 %
NEUTS SEG # BLD MANUAL: 13.76 X10*3/UL (ref 1.2–7)
NEUTS SEG # BLD MANUAL: 7.5 X10*3/UL (ref 1.2–7)
NEUTS SEG NFR BLD MANUAL: 34.7 %
NEUTS SEG NFR BLD MANUAL: 68.1 %
NRBC BLD-RTO: 302.9 /100 WBCS (ref 0–0)
NRBC BLD-RTO: 328.2 /100 WBCS (ref 0–0)
NRBC BLD-RTO: 339.8 /100 WBCS (ref 0–0)
NRBC BLD-RTO: 349.8 /100 WBCS (ref 0–0)
NRBC BLD-RTO: 356.4 /100 WBCS (ref 0–0)
OXYHGB MFR BLDV: 62.8 % (ref 45–75)
PATH REVIEW-HGB IDENTIFICATION: ABNORMAL
PATH REVIEW-HGB IDENTIFICATION: ABNORMAL
PCO2 BLDV: 39 MM HG (ref 41–51)
PH BLDV: 7.38 PH (ref 7.33–7.43)
PHOSPHATE SERPL-MCNC: 3.8 MG/DL (ref 2.5–4.9)
PHOSPHATE SERPL-MCNC: 5.1 MG/DL (ref 2.5–4.9)
PLATELET # BLD AUTO: 74 X10*3/UL (ref 150–450)
PLATELET # BLD AUTO: 74 X10*3/UL (ref 150–450)
PLATELET # BLD AUTO: 76 X10*3/UL (ref 150–450)
PLATELET # BLD AUTO: 79 X10*3/UL (ref 150–450)
PLATELET # BLD AUTO: 85 X10*3/UL (ref 150–450)
PO2 BLDV: 44 MM HG (ref 35–45)
POLYCHROMASIA BLD QL SMEAR: ABNORMAL
POLYCHROMASIA BLD QL SMEAR: ABNORMAL
POTASSIUM BLDV-SCNC: 5.4 MMOL/L (ref 3.5–5.3)
POTASSIUM SERPL-SCNC: 4.8 MMOL/L (ref 3.5–5.3)
POTASSIUM SERPL-SCNC: 5 MMOL/L (ref 3.5–5.3)
POTASSIUM SERPL-SCNC: 5.6 MMOL/L (ref 3.5–5.3)
POTASSIUM SERPL-SCNC: 5.7 MMOL/L (ref 3.5–5.3)
PROMYELOCYTES # BLD MANUAL: 0.42 X10*3/UL
PROMYELOCYTES NFR BLD MANUAL: 2.1 %
PROT SERPL-MCNC: 4.5 G/DL (ref 6.4–8.2)
PROT SERPL-MCNC: 4.6 G/DL (ref 6.4–8.2)
PROT SERPL-MCNC: 4.7 G/DL (ref 6.4–8.2)
PROT SERPL-MCNC: 4.8 G/DL (ref 6.4–8.2)
PROTHROMBIN TIME: 19.1 SECONDS (ref 9.8–12.8)
PROTHROMBIN TIME: 20.6 SECONDS (ref 9.8–12.8)
PROTHROMBIN TIME: 23.8 SECONDS (ref 9.8–12.8)
PROTHROMBIN TIME: 27.1 SECONDS (ref 9.8–12.8)
PROTHROMBIN TIME: 27.9 SECONDS (ref 9.8–12.8)
RBC # BLD AUTO: 3.11 X10*6/UL (ref 4–5.2)
RBC # BLD AUTO: 3.19 X10*6/UL (ref 4–5.2)
RBC # BLD AUTO: 3.26 X10*6/UL (ref 4–5.2)
RBC # BLD AUTO: 3.48 X10*6/UL (ref 4–5.2)
RBC # BLD AUTO: 3.5 X10*6/UL (ref 4–5.2)
RBC MORPH BLD: ABNORMAL
RBC MORPH BLD: ABNORMAL
RETICS #: 0.42 X10*6/UL (ref 0.02–0.08)
RETICS/RBC NFR AUTO: 12.1 % (ref 0.5–2)
S PNEUM AG UR QL: NEGATIVE
SAO2 % BLDV: 65 % (ref 45–75)
SCHISTOCYTES BLD QL SMEAR: ABNORMAL
SICKLE CELLS BLD QL SMEAR: ABNORMAL
SODIUM BLDV-SCNC: 124 MMOL/L (ref 136–145)
SODIUM SERPL-SCNC: 129 MMOL/L (ref 136–145)
SODIUM SERPL-SCNC: 129 MMOL/L (ref 136–145)
SODIUM SERPL-SCNC: 130 MMOL/L (ref 136–145)
SODIUM SERPL-SCNC: 130 MMOL/L (ref 136–145)
STAPHYLOCOCCUS SPEC CULT: NORMAL
STAPHYLOCOCCUS SPEC CULT: NORMAL
TARGETS BLD QL SMEAR: ABNORMAL
TARGETS BLD QL SMEAR: ABNORMAL
TOTAL CELLS COUNTED BLD: 47
TOTAL CELLS COUNTED BLD: 49
VANCOMYCIN SERPL-MCNC: 19.7 UG/ML (ref 5–20)
WBC # BLD AUTO: 20.2 X10*3/UL (ref 4.4–11.3)
WBC # BLD AUTO: 21.6 X10*3/UL (ref 4.4–11.3)
WBC # BLD AUTO: 23.1 X10*3/UL (ref 4.4–11.3)
WBC # BLD AUTO: 23.4 X10*3/UL (ref 4.4–11.3)
WBC # BLD AUTO: 24.4 X10*3/UL (ref 4.4–11.3)

## 2024-09-12 PROCEDURE — 93005 ELECTROCARDIOGRAM TRACING: CPT

## 2024-09-12 PROCEDURE — 87899 AGENT NOS ASSAY W/OPTIC: CPT

## 2024-09-12 PROCEDURE — 84100 ASSAY OF PHOSPHORUS: CPT

## 2024-09-12 PROCEDURE — 82248 BILIRUBIN DIRECT: CPT

## 2024-09-12 PROCEDURE — 37799 UNLISTED PX VASCULAR SURGERY: CPT

## 2024-09-12 PROCEDURE — 82330 ASSAY OF CALCIUM: CPT

## 2024-09-12 PROCEDURE — 85384 FIBRINOGEN ACTIVITY: CPT

## 2024-09-12 PROCEDURE — 2500000005 HC RX 250 GENERAL PHARMACY W/O HCPCS

## 2024-09-12 PROCEDURE — 2500000002 HC RX 250 W HCPCS SELF ADMINISTERED DRUGS (ALT 637 FOR MEDICARE OP, ALT 636 FOR OP/ED)

## 2024-09-12 PROCEDURE — 83615 LACTATE (LD) (LDH) ENZYME: CPT

## 2024-09-12 PROCEDURE — 2500000004 HC RX 250 GENERAL PHARMACY W/ HCPCS (ALT 636 FOR OP/ED)

## 2024-09-12 PROCEDURE — 84132 ASSAY OF SERUM POTASSIUM: CPT

## 2024-09-12 PROCEDURE — 82435 ASSAY OF BLOOD CHLORIDE: CPT

## 2024-09-12 PROCEDURE — 83010 ASSAY OF HAPTOGLOBIN QUANT: CPT

## 2024-09-12 PROCEDURE — 85610 PROTHROMBIN TIME: CPT

## 2024-09-12 PROCEDURE — 80053 COMPREHEN METABOLIC PANEL: CPT

## 2024-09-12 PROCEDURE — 94003 VENT MGMT INPAT SUBQ DAY: CPT

## 2024-09-12 PROCEDURE — 85027 COMPLETE CBC AUTOMATED: CPT

## 2024-09-12 PROCEDURE — 36415 COLL VENOUS BLD VENIPUNCTURE: CPT

## 2024-09-12 PROCEDURE — 85730 THROMBOPLASTIN TIME PARTIAL: CPT

## 2024-09-12 PROCEDURE — 06HY33Z INSERTION OF INFUSION DEVICE INTO LOWER VEIN, PERCUTANEOUS APPROACH: ICD-10-PCS

## 2024-09-12 PROCEDURE — 85045 AUTOMATED RETICULOCYTE COUNT: CPT

## 2024-09-12 PROCEDURE — 83735 ASSAY OF MAGNESIUM: CPT

## 2024-09-12 PROCEDURE — 2500000005 HC RX 250 GENERAL PHARMACY W/O HCPCS: Performed by: INTERNAL MEDICINE

## 2024-09-12 PROCEDURE — 85007 BL SMEAR W/DIFF WBC COUNT: CPT

## 2024-09-12 PROCEDURE — 87040 BLOOD CULTURE FOR BACTERIA: CPT

## 2024-09-12 PROCEDURE — 2020000001 HC ICU ROOM DAILY

## 2024-09-12 PROCEDURE — 87449 NOS EACH ORGANISM AG IA: CPT

## 2024-09-12 PROCEDURE — 99222 1ST HOSP IP/OBS MODERATE 55: CPT

## 2024-09-12 PROCEDURE — 2500000001 HC RX 250 WO HCPCS SELF ADMINISTERED DRUGS (ALT 637 FOR MEDICARE OP)

## 2024-09-12 PROCEDURE — 99291 CRITICAL CARE FIRST HOUR: CPT

## 2024-09-12 PROCEDURE — 82947 ASSAY GLUCOSE BLOOD QUANT: CPT

## 2024-09-12 PROCEDURE — 99232 SBSQ HOSP IP/OBS MODERATE 35: CPT | Performed by: INTERNAL MEDICINE

## 2024-09-12 PROCEDURE — 80076 HEPATIC FUNCTION PANEL: CPT | Mod: CCI

## 2024-09-12 PROCEDURE — 99233 SBSQ HOSP IP/OBS HIGH 50: CPT | Performed by: STUDENT IN AN ORGANIZED HEALTH CARE EDUCATION/TRAINING PROGRAM

## 2024-09-12 PROCEDURE — 93010 ELECTROCARDIOGRAM REPORT: CPT | Performed by: INTERNAL MEDICINE

## 2024-09-12 RX ORDER — VANCOMYCIN HYDROCHLORIDE 500 MG/100ML
500 INJECTION, SOLUTION INTRAVENOUS ONCE
Status: COMPLETED | OUTPATIENT
Start: 2024-09-12 | End: 2024-09-12

## 2024-09-12 RX ORDER — POLYETHYLENE GLYCOL 3350 17 G/17G
17 POWDER, FOR SOLUTION ORAL 2 TIMES DAILY
Status: DISPENSED | OUTPATIENT
Start: 2024-09-12

## 2024-09-12 RX ORDER — MAGNESIUM SULFATE HEPTAHYDRATE 40 MG/ML
2 INJECTION, SOLUTION INTRAVENOUS ONCE
Status: COMPLETED | OUTPATIENT
Start: 2024-09-12 | End: 2024-09-12

## 2024-09-12 RX ORDER — SENNOSIDES 8.6 MG/1
2 TABLET ORAL 2 TIMES DAILY
Status: DISPENSED | OUTPATIENT
Start: 2024-09-12

## 2024-09-12 ASSESSMENT — PAIN SCALES - GENERAL: PAINLEVEL_OUTOF10: 0 - NO PAIN

## 2024-09-12 ASSESSMENT — PAIN - FUNCTIONAL ASSESSMENT
PAIN_FUNCTIONAL_ASSESSMENT: CPOT (CRITICAL CARE PAIN OBSERVATION TOOL)

## 2024-09-12 NOTE — PROGRESS NOTES
Vancomycin Dosing by Pharmacy- FOLLOW UP    Senait Narvaez is a 54 y.o. year old female who Pharmacy has been consulted for vancomycin dosing for  sepsis/bacteremia . Based on the patient's indication and renal status this patient is being dosed based on a goal trough/random level of 15-20.     CHARLES on CKD II (BL SCr <2 per nephro). No indication for RRT at this time.     Current vancomycin dose:  2000mg once 9/11 @0110    Most recent trough level: 19.7 mcg/mL 9/11 @2353 -23hr level    Estimated Creatinine Clearance: 15.7 mL/min (A) (by C-G formula based on SCr of 5.34 mg/dL (H)).     Results from last 7 days   Lab Units 09/12/24  0441 09/11/24  2353 09/11/24  1754   BUN mg/dL 70* 66* 67*   CREATININE mg/dL 5.34* 4.82* 4.40*   WBC AUTO x10*3/uL 21.6* 20.2* 16.8*   VANCOMYCIN RM ug/mL  --  19.7  --         Blood Culture   Date/Time Value Ref Range Status   09/10/2024 09:21 PM No growth at 1 day  Preliminary   09/10/2024 09:21 PM No growth at 1 day  Preliminary     Gram Stain   Date/Time Value Ref Range Status   09/11/2024 01:03 AM   Preliminary    Gram stain indicates specimen consists of lower respiratory tract secretions.   09/11/2024 01:03 AM No predominant organism  Preliminary     C. difficile Toxin, PCR   Date/Time Value Ref Range Status   02/23/2021 11:58 AM NOT DETECTED Not Detected Final     Comment:      This assay detects the presence of the tcdB (toxin B)   gene via DNA amplification, and results should be   interpreted in the context of the patients history   and clinical findings. This test cannot be performed   on formed stools or used as a test of cure, and should   not be performed more than once per 7 days.          No results found for the last 90 days.      Visit Vitals  /64   Pulse 89   Temp 37 °C (98.6 °F) (Temporal)   Resp 24        Assessment/Plan    Within goal random/trough level. Will re-dose with Vancomycin 500 mg x1 9/12 @0900.   The next level will be obtained on 9/13 AM labs. May be  obtained sooner if clinically indicated.   Will continue to monitor renal function daily while on vancomycin and order serum creatinine at least every 48 hours if not already ordered.  Follow for continued vancomycin needs, clinical response, and signs/symptoms of toxicity.       Margarita Vargas, PharmD

## 2024-09-12 NOTE — PROCEDURES
Central Line Procedure Note: FEMORAL LINE PLACEMENT  A time out was performed. My hands were washed immediately prior to the procedure. I wore a surgical cap, mask with protective eyewear, full gown and sterile gloves throughout the procedure. The patient was placed in flat supine position. The Right groin was prepped using  chlorhexidine scrub and draped in sterile fashion using a full sheet drape and sterile towels. Skin preparation was allowed to dry prior to skin puncture. Anatomic landmarks were identified. Anesthesia was achieved over the vein using 1% lidocaine. Using real-time ultrasound, with sterile probe cover and sterile gel, the introducer needle was inserted into the vein under direct ultrasound visualization. Procedure was attempted twice. Venous blood was withdrawn. The syringe was removed and a guidewire was advanced into the introducer needle. The guidewire was visualized in the appropriate vein by ultrasound. The introducer needle was exchanged for a dilator over the guidewire. After appropriate dilation was obtained, the dilator was exchanged over the wire for a central venous catheter. The wire was removed and the catheter was sutured in place.  A sterile dressing was placed over the catheter and biopatch. The patient tolerated the procedure without any hemodynamic compromise. At time of procedure completion, all ports aspirated and flushed properly.

## 2024-09-12 NOTE — PROGRESS NOTES
Senait Narvaez is a 54 y.o. female on day 2 of admission presenting with Sickle cell crisis (Multi).      Subjective   No events overnight. Patient under sedation on MV, open eyes with loud voice.        Objective     Last Recorded Vitals  /64   Pulse 89   Temp 37 °C (98.6 °F) (Temporal)   Resp 24   Wt 123 kg (270 lb 8.1 oz)   SpO2 97%   Intake/Output last 3 Shifts:    Intake/Output Summary (Last 24 hours) at 9/12/2024 0815  Last data filed at 9/12/2024 0800  Gross per 24 hour   Intake 5789.8 ml   Output 2974 ml   Net 2815.8 ml       Admission Weight  Weight: 110 kg (242 lb 8.1 oz) (09/10/24 2000)    Daily Weight  09/12/24 : 123 kg (270 lb 8.1 oz)    Image Results  US abdomen complete  Narrative: Interpreted By:  Arnoldo Noonan and Barbat Antonio   STUDY:  US ABDOMEN COMPLETE;  9/11/2024 7:10 pm      INDICATION:  Signs/Symptoms:acute liver injury.      COMPARISON:  CT chest abdomen pelvis 09/11/2024. Ultrasound renal 05/11/2024.      ACCESSION NUMBER(S):  [id number]      ORDERING CLINICIAN:  KWAKU IVERSON      TECHNIQUE:  Multiple images of the right upper quadrant were obtained.  Gray  scale, color Doppler and spectral Doppler waveform analysis was  performed.  This examination was interpreted at [facility].      FINDINGS:  LIMITATIONS: Patient body habitus. Overlying bowel gas. All findings  are reported within these limitations.      LIVER:  The liver measures 17.3 cm (near the upper limit of normal for size)  and is diffusely echogenic in appearance, consistent with diffuse  fatty infiltration. The resulting increased beam attenuation thereby  limiting evaluation of the liver for focal lesions. Within the  limitations, no focal lesions are seen.      GALLBLADDER:  Status post cholecystectomy.      BILIARY SYSTEM:  No evidence of intra or extrahepatic biliary dilatation is  identified; the common bile duct measures 0.3 cm.      DOPPLER EVALUATION:       HEPATIC ARTERIES:  Hepatic artery and its right and left branches RI's are estimated at  0.7, 0.7 and 0.7, respectively.      PORTAL VEIN:  Portal vein is patent and measures 0.8 cm. There is normal  respiratory variation. Portal vein velocities are calculated as  follows: main portal vein 35.0 cm/s, antegrade flow; left portal vein  branch 16.8 cm/s, antegrade flow; right portal vein anterior branch  21.2 cm/s, antegrade flow; right portal vein posterior branch 30.5  cm/s, antegrade flow. The splenic vein is also patent.      HEPATIC VEIN:  The right, middle and left hepatic veins are patent and demonstrate  triphasic antegrade flow. IVC appears also patent.      PANCREAS:  The pancreas is poorly visualized due to overlying bowel gas.      RIGHT KIDNEY:  The right kidney measures 12.7 cm in length. No hydronephrosis or  renal calculi are seen.      SPLEEN:  The spleen measures 11.0 cm and is grossly unremarkable.      PERITONEUM AND RETROPERITONEUM:  There is no free or loculated fluid seen in the abdomen.      Impression: 1. The liver size is near the upper limit of normal and diffusely  echogenic in appearance, consistent with diffuse fatty infiltration.  2. Unremarkable doppler interrogation of the liver.      I personally reviewed the images/study and I agree with the findings  as stated by Christopher Soriano MD. This study was interpreted at  [facility], [place], [place]      MACRO:  None      Signed by: Arnoldo Noonan 9/12/2024 6:00 AM  Dictation workstation:   [id number]      Physical Exam  Constitutional:       Appearance: She is ill-appearing.   HENT:      Head: Normocephalic.   Eyes:      General: Scleral icterus present.   Cardiovascular:      Rate and Rhythm: Normal rate and regular rhythm.   Pulmonary:      Breath sounds: Normal breath sounds.      Comments: On mechanical  ventilation  Abdominal:      General: Abdomen is flat. There is no distension.      Palpations:  Abdomen is soft.      Tenderness: There is no guarding.   Skin:     Capillary Refill: Capillary refill takes less than 2 seconds.   Neurological:      Comments: Patient under sedation fent/propofol RAAS -1         Relevant Results             Results for orders placed or performed during the hospital encounter of 09/10/24 (from the past 24 hour(s))   Transthoracic Echo (TTE) Complete   Result Value Ref Range    AV pk rhett 1.66 m/s    AV mn grad 6.0 mmHg    LVOT diam 2.00 cm    MV E/A ratio 1.02     Tricuspid annular plane systolic excursion 1.3 cm    LA vol index A/L 10.1 ml/m2    LV EF 63 %    RV free wall pk S' 9.36 cm/s    LVIDd 3.70 cm    Aortic Valve Area by Continuity of VTI 2.55 cm2    Aortic Valve Area by Continuity of Peak Velocity 2.73 cm2    AV pk grad 11.0 mmHg    LV A4C EF 78.2    POCT GLUCOSE   Result Value Ref Range    POCT Glucose <10 (L) 74 - 99 mg/dL   POCT GLUCOSE   Result Value Ref Range    POCT Glucose 98 74 - 99 mg/dL   Coagulation Screen   Result Value Ref Range    Protime 33.3 (H) 9.8 - 12.8 seconds    INR 2.9 (H) 0.9 - 1.1    aPTT 28 27 - 38 seconds   Acute Toxicology Panel, Blood   Result Value Ref Range    Acetaminophen 10.1 10.0 - 30.0 ug/mL    Salicylate  <3 4 - 20 mg/dL    Alcohol <10 <=10 mg/dL   EBV Screen (VCA IgG/IgM)   Result Value Ref Range    EBV VCA, IgG  Positive (A) Negative    EBV VCA, IgM  Negative Negative   Creatine Kinase   Result Value Ref Range    Creatine Kinase 944 (H) 0 - 215 U/L   EBV Early Antigen Antibody, IgG   Result Value Ref Range    EBV Early Antigen Antibody, IgG Negative Negative   Trish-Barr Virus Nuclear Antigen Antibody, IgG   Result Value Ref Range    EBV Nuclear Antigen Antibody, IgG Positive (A) Negative   POCT GLUCOSE   Result Value Ref Range    POCT Glucose 121 (H) 74 - 99 mg/dL   Cytomegalovirus IgG   Result Value Ref Range    Cytomegalovirus IgG Reactive (A) Nonreactive   Vitamin D 25-Hydroxy,Total (for eval of Vitamin D levels)   Result Value  Ref Range    Vitamin D, 25-Hydroxy, Total 8 (L) 30 - 100 ng/mL   Renal function panel   Result Value Ref Range    Glucose 116 (H) 74 - 99 mg/dL    Sodium 132 (L) 136 - 145 mmol/L    Potassium 4.9 3.5 - 5.3 mmol/L    Chloride 96 (L) 98 - 107 mmol/L    Bicarbonate 17 (L) 21 - 32 mmol/L    Anion Gap 24 (H) 10 - 20 mmol/L    Urea Nitrogen 61 (H) 6 - 23 mg/dL    Creatinine 4.07 (H) 0.50 - 1.05 mg/dL    eGFR 12 (L) >60 mL/min/1.73m*2    Calcium 8.0 (L) 8.6 - 10.6 mg/dL    Phosphorus 4.7 2.5 - 4.9 mg/dL    Albumin 2.3 (L) 3.4 - 5.0 g/dL   CBC   Result Value Ref Range    WBC 10.1 4.4 - 11.3 x10*3/uL    nRBC 405.5 (H) 0.0 - 0.0 /100 WBCs    RBC 3.27 (L) 4.00 - 5.20 x10*6/uL    Hemoglobin 9.8 (L) 12.0 - 16.0 g/dL    Hematocrit 27.4 (L) 36.0 - 46.0 %    MCV 84 80 - 100 fL    MCH 30.0 26.0 - 34.0 pg    MCHC 35.8 32.0 - 36.0 g/dL    RDW 19.4 (H) 11.5 - 14.5 %    Platelets 58 (L) 150 - 450 x10*3/uL   Calcium, Ionized   Result Value Ref Range    POCT Calcium, Ionized 0.95 (L) 1.1 - 1.33 mmol/L   HEMOGLOBIN IDENTIFICATION WITH PATH REVIEW   Result Value Ref Range    Hemoglobin A      Hemoglobin F      Hemoglobin S 14.7 (H) <=0.0 %    Hemoglobin C 18.1 (H) <=0.0 %    Hemoglobin A2      Hemoglobin Identification Interpretation      Pathologist Review-Hemoglobin Identification     BLOOD GAS ARTERIAL FULL PANEL   Result Value Ref Range    POCT pH, Arterial 7.44 (H) 7.38 - 7.42 pH    POCT pCO2, Arterial 28 (L) 38 - 42 mm Hg    POCT pO2, Arterial 138 (H) 85 - 95 mm Hg    POCT SO2, Arterial 99 94 - 100 %    POCT Oxy Hemoglobin, Arterial 95.7 94.0 - 98.0 %    POCT Hematocrit Calculated, Arterial 33.0 (L) 36.0 - 46.0 %    POCT Sodium, Arterial 127 (L) 136 - 145 mmol/L    POCT Potassium, Arterial 5.7 (H) 3.5 - 5.3 mmol/L    POCT Chloride, Arterial 100 98 - 107 mmol/L    POCT Ionized Calcium, Arterial 1.04 (L) 1.10 - 1.33 mmol/L    POCT Glucose, Arterial 90 74 - 99 mg/dL    POCT Lactate, Arterial 3.9 (H) 0.4 - 2.0 mmol/L    POCT Base Excess,  Arterial -4.1 (L) -2.0 - 3.0 mmol/L    POCT HCO3 Calculated, Arterial 19.0 (L) 22.0 - 26.0 mmol/L    POCT Hemoglobin, Arterial 11.1 (L) 12.0 - 16.0 g/dL    POCT Anion Gap, Arterial 14 10 - 25 mmo/L    Patient Temperature 37.0 degrees Celsius    FiO2 50 %   Prepare Plasma: 1 Units   Result Value Ref Range    PRODUCT CODE Y4195V41     Unit Number [id number]     Unit ABO AB     Unit RH NEG     Dispense Status TR     Blood Expiration Date 9/12/2024  9:45:00 PM EDT     PRODUCT BLOOD TYPE 2800     UNIT VOLUME 283    CBC and Auto Differential   Result Value Ref Range    WBC 16.8 (H) 4.4 - 11.3 x10*3/uL    nRBC 372.0 (H) 0.0 - 0.0 /100 WBCs    RBC 3.68 (L) 4.00 - 5.20 x10*6/uL    Hemoglobin 10.8 (L) 12.0 - 16.0 g/dL    Hematocrit 29.5 (L) 36.0 - 46.0 %    MCV 80 80 - 100 fL    MCH 29.3 26.0 - 34.0 pg    MCHC 36.6 (H) 32.0 - 36.0 g/dL    RDW 18.7 (H) 11.5 - 14.5 %    Platelets 83 (L) 150 - 450 x10*3/uL    Neutrophils % 89.8 40.0 - 80.0 %    Immature Granulocytes %, Automated 0.7 0.0 - 0.9 %    Lymphocytes % 1.8 13.0 - 44.0 %    Monocytes % 6.7 2.0 - 10.0 %    Eosinophils % 0.2 0.0 - 6.0 %    Basophils % 0.8 0.0 - 2.0 %    Neutrophils Absolute 15.03 (H) 1.20 - 7.70 x10*3/uL    Immature Granulocytes Absolute, Automated 0.12 0.00 - 0.70 x10*3/uL    Lymphocytes Absolute 0.31 (L) 1.20 - 4.80 x10*3/uL    Monocytes Absolute 1.13 (H) 0.10 - 1.00 x10*3/uL    Eosinophils Absolute 0.04 0.00 - 0.70 x10*3/uL    Basophils Absolute 0.13 (H) 0.00 - 0.10 x10*3/uL   Magnesium   Result Value Ref Range    Magnesium 1.95 1.60 - 2.40 mg/dL   Coagulation Screen   Result Value Ref Range    Protime 32.8 (H) 9.8 - 12.8 seconds    INR 2.9 (H) 0.9 - 1.1    aPTT 29 27 - 38 seconds   Hepatic function panel   Result Value Ref Range    Albumin 2.6 (L) 3.4 - 5.0 g/dL    Bilirubin, Total 8.8 (H) 0.0 - 1.2 mg/dL    Bilirubin, Direct 3.7 (H) 0.0 - 0.3 mg/dL    Alkaline Phosphatase 242 (H) 33 - 110 U/L    ALT 3,260 (H) 7 - 45 U/L    AST 6,218 (H) 9 - 39  U/L    Total Protein 4.6 (L) 6.4 - 8.2 g/dL   Phosphorus   Result Value Ref Range    Phosphorus 3.6 2.5 - 4.9 mg/dL   Basic Metabolic Panel   Result Value Ref Range    Glucose 105 (H) 74 - 99 mg/dL    Sodium 131 (L) 136 - 145 mmol/L    Potassium 5.6 (H) 3.5 - 5.3 mmol/L    Chloride 96 (L) 98 - 107 mmol/L    Bicarbonate 21 21 - 32 mmol/L    Anion Gap 20 10 - 20 mmol/L    Urea Nitrogen 67 (H) 6 - 23 mg/dL    Creatinine 4.40 (H) 0.50 - 1.05 mg/dL    eGFR 11 (L) >60 mL/min/1.73m*2    Calcium 8.0 (L) 8.6 - 10.6 mg/dL   Lactate dehydrogenase   Result Value Ref Range    LDH 10,552 (H) 84 - 246 U/L   Morphology   Result Value Ref Range    RBC Morphology See Below     Polychromasia Mild     RBC Fragments Few     Target Cells Few     Sandra Cells Few    POCT GLUCOSE   Result Value Ref Range    POCT Glucose 127 (H) 74 - 99 mg/dL   POCT GLUCOSE   Result Value Ref Range    POCT Glucose 25 (L) 74 - 99 mg/dL   POCT GLUCOSE   Result Value Ref Range    POCT Glucose <10 (L) 74 - 99 mg/dL   POCT GLUCOSE   Result Value Ref Range    POCT Glucose 100 (H) 74 - 99 mg/dL   POCT GLUCOSE   Result Value Ref Range    POCT Glucose 123 (H) 74 - 99 mg/dL   Vancomycin   Result Value Ref Range    Vancomycin 19.7 5.0 - 20.0 ug/mL   CBC and Auto Differential   Result Value Ref Range    WBC 20.2 (H) 4.4 - 11.3 x10*3/uL    nRBC 349.8 (H) 0.0 - 0.0 /100 WBCs    RBC 3.48 (L) 4.00 - 5.20 x10*6/uL    Hemoglobin 10.3 (L) 12.0 - 16.0 g/dL    Hematocrit 28.0 (L) 36.0 - 46.0 %    MCV 81 80 - 100 fL    MCH 29.6 26.0 - 34.0 pg    MCHC 36.8 (H) 32.0 - 36.0 g/dL    RDW 19.3 (H) 11.5 - 14.5 %    Platelets 85 (L) 150 - 450 x10*3/uL    Immature Granulocytes %, Automated 1.4 (H) 0.0 - 0.9 %    Immature Granulocytes Absolute, Automated 0.29 0.00 - 0.70 x10*3/uL   Magnesium   Result Value Ref Range    Magnesium 1.96 1.60 - 2.40 mg/dL   Coagulation Screen   Result Value Ref Range    Protime 27.9 (H) 9.8 - 12.8 seconds    INR 2.5 (H) 0.9 - 1.1    aPTT 30 27 - 38 seconds    Hepatic function panel   Result Value Ref Range    Albumin 2.5 (L) 3.4 - 5.0 g/dL    Bilirubin, Total 9.2 (H) 0.0 - 1.2 mg/dL    Bilirubin, Direct 3.9 (H) 0.0 - 0.3 mg/dL    Alkaline Phosphatase 249 (H) 33 - 110 U/L    ALT 2,840 (H) 7 - 45 U/L    AST 4,126 (H) 9 - 39 U/L    Total Protein 4.5 (L) 6.4 - 8.2 g/dL   Phosphorus   Result Value Ref Range    Phosphorus 3.8 2.5 - 4.9 mg/dL   Basic Metabolic Panel   Result Value Ref Range    Glucose 104 (H) 74 - 99 mg/dL    Sodium 130 (L) 136 - 145 mmol/L    Potassium 5.7 (H) 3.5 - 5.3 mmol/L    Chloride 93 (L) 98 - 107 mmol/L    Bicarbonate 25 21 - 32 mmol/L    Anion Gap 18 10 - 20 mmol/L    Urea Nitrogen 66 (H) 6 - 23 mg/dL    Creatinine 4.82 (H) 0.50 - 1.05 mg/dL    eGFR 10 (L) >60 mL/min/1.73m*2    Calcium 7.7 (L) 8.6 - 10.6 mg/dL   Manual Differential   Result Value Ref Range    Neutrophils %, Manual 68.1 40.0 - 80.0 %    Bands %, Manual 12.8 0.0 - 5.0 %    Lymphocytes %, Manual 6.4 13.0 - 44.0 %    Monocytes %, Manual 0.0 2.0 - 10.0 %    Eosinophils %, Manual 0.0 0.0 - 6.0 %    Basophils %, Manual 0.0 0.0 - 2.0 %    Metamyelocytes %, Manual 10.6 0.0 - 0.0 %    Promyelocytes %, Manual 2.1 0.0 - 0.0 %    Seg Neutrophils Absolute, Manual 13.76 (H) 1.20 - 7.00 x10*3/uL    Bands Absolute, Manual 2.59 (H) 0.00 - 0.70 x10*3/uL    Lymphocytes Absolute, Manual 1.29 1.20 - 4.80 x10*3/uL    Monocytes Absolute, Manual 0.00 (L) 0.10 - 1.00 x10*3/uL    Eosinophils Absolute, Manual 0.00 0.00 - 0.70 x10*3/uL    Basophils Absolute, Manual 0.00 0.00 - 0.10 x10*3/uL    Metamyelocytes Absolute, Manual 2.14 0.00 - 0.00 x10*3/uL    Promyelocytes Absolute, Manual 0.42 0.00 - 0.00 x10*3/uL    Total Cells Counted 47     Neutrophils Absolute, Manual 16.35 (H) 1.20 - 7.70 x10*3/uL    RBC Morphology See Below     Polychromasia Mild     Target Cells Few     Basophilic Stippling Present    Lactate dehydrogenase   Result Value Ref Range    LDH 4,233 (H) 84 - 246 U/L   Reticulocytes   Result Value  Ref Range    Retic % 12.1 (H) 0.5 - 2.0 %    Retic Absolute 0.424 (H) 0.018 - 0.083 x10*6/uL    Reticulocyte Hemoglobin 30 28 - 38 pg    Immature Retic fraction 18.1 (H) <=16.0 %   CBC and Auto Differential   Result Value Ref Range    WBC 21.6 (H) 4.4 - 11.3 x10*3/uL    nRBC 356.4 (H) 0.0 - 0.0 /100 WBCs    RBC 3.50 (L) 4.00 - 5.20 x10*6/uL    Hemoglobin 10.4 (L) 12.0 - 16.0 g/dL    Hematocrit 28.3 (L) 36.0 - 46.0 %    MCV 81 80 - 100 fL    MCH 29.7 26.0 - 34.0 pg    MCHC 36.7 (H) 32.0 - 36.0 g/dL    RDW 20.0 (H) 11.5 - 14.5 %    Platelets 79 (L) 150 - 450 x10*3/uL    Immature Granulocytes %, Automated 1.6 (H) 0.0 - 0.9 %    Immature Granulocytes Absolute, Automated 0.34 0.00 - 0.70 x10*3/uL   Magnesium   Result Value Ref Range    Magnesium 2.03 1.60 - 2.40 mg/dL   Coagulation Screen   Result Value Ref Range    Protime 27.1 (H) 9.8 - 12.8 seconds    INR 2.4 (H) 0.9 - 1.1    aPTT 31 27 - 38 seconds   Hepatic function panel   Result Value Ref Range    Albumin 2.5 (L) 3.4 - 5.0 g/dL    Bilirubin, Total 9.3 (H) 0.0 - 1.2 mg/dL    Bilirubin, Direct 4.1 (H) 0.0 - 0.3 mg/dL    Alkaline Phosphatase 262 (H) 33 - 110 U/L    ALT 2,735 (H) 7 - 45 U/L    AST 3,236 (H) 9 - 39 U/L    Total Protein 4.6 (L) 6.4 - 8.2 g/dL   Haptoglobin   Result Value Ref Range    Haptoglobin <30 (L) 30 - 200 mg/dL   Fibrinogen   Result Value Ref Range    Fibrinogen 310 200 - 400 mg/dL   Manual Differential   Result Value Ref Range    Neutrophils %, Manual 34.7 40.0 - 80.0 %    Bands %, Manual 49.0 0.0 - 5.0 %    Lymphocytes %, Manual 0.0 13.0 - 44.0 %    Monocytes %, Manual 6.1 2.0 - 10.0 %    Eosinophils %, Manual 0.0 0.0 - 6.0 %    Basophils %, Manual 0.0 0.0 - 2.0 %    Metamyelocytes %, Manual 4.1 0.0 - 0.0 %    Myelocytes %, Manual 6.1 0.0 - 0.0 %    Seg Neutrophils Absolute, Manual 7.50 (H) 1.20 - 7.00 x10*3/uL    Bands Absolute, Manual 10.58 (H) 0.00 - 0.70 x10*3/uL    Lymphocytes Absolute, Manual 0.00 (L) 1.20 - 4.80 x10*3/uL    Monocytes  Absolute, Manual 1.32 (H) 0.10 - 1.00 x10*3/uL    Eosinophils Absolute, Manual 0.00 0.00 - 0.70 x10*3/uL    Basophils Absolute, Manual 0.00 0.00 - 0.10 x10*3/uL    Metamyelocytes Absolute, Manual 0.89 0.00 - 0.00 x10*3/uL    Myelocytes Absolute, Manual 1.32 0.00 - 0.00 x10*3/uL    Total Cells Counted 49     Neutrophils Absolute, Manual 18.08 (H) 1.20 - 7.70 x10*3/uL    RBC Morphology See Below     Polychromasia Mild     RBC Fragments Few     Sickle Cells Few     Target Cells Few     Teardrop Cells Few     Sandra Cells Few     Acanthocytes Few     Basophilic Stippling Present    Basic metabolic panel   Result Value Ref Range    Glucose 99 74 - 99 mg/dL    Sodium 130 (L) 136 - 145 mmol/L    Potassium 5.6 (H) 3.5 - 5.3 mmol/L    Chloride 93 (L) 98 - 107 mmol/L    Bicarbonate 21 21 - 32 mmol/L    Anion Gap 22 (H) 10 - 20 mmol/L    Urea Nitrogen 70 (H) 6 - 23 mg/dL    Creatinine 5.34 (H) 0.50 - 1.05 mg/dL    eGFR 9 (L) >60 mL/min/1.73m*2    Calcium 7.7 (L) 8.6 - 10.6 mg/dL   POCT GLUCOSE   Result Value Ref Range    POCT Glucose 129 (H) 74 - 99 mg/dL   Blood Gas Venous Full Panel   Result Value Ref Range    POCT pH, Venous 7.38 7.33 - 7.43 pH    POCT pCO2, Venous 39 (L) 41 - 51 mm Hg    POCT pO2, Venous 44 35 - 45 mm Hg    POCT SO2, Venous 65 45 - 75 %    POCT Oxy Hemoglobin, Venous 62.8 45.0 - 75.0 %    POCT Hematocrit Calculated, Venous 31.0 (L) 36.0 - 46.0 %    POCT Sodium, Venous 124 (L) 136 - 145 mmol/L    POCT Potassium, Venous 5.4 (H) 3.5 - 5.3 mmol/L    POCT Chloride, Venous 94 (L) 98 - 107 mmol/L    POCT Ionized Calicum, Venous 1.04 (L) 1.10 - 1.33 mmol/L    POCT Glucose, Venous 122 (H) 74 - 99 mg/dL    POCT Lactate, Venous 2.7 (H) 0.4 - 2.0 mmol/L    POCT Base Excess, Venous -1.8 -2.0 - 3.0 mmol/L    POCT HCO3 Calculated, Venous 23.1 22.0 - 26.0 mmol/L    POCT Hemoglobin, Venous 10.4 (L) 12.0 - 16.0 g/dL    POCT Anion Gap, Venous 12.0 10.0 - 25.0 mmol/L    Patient Temperature 37.0 degrees Celsius    FiO2 30 %      *Note: Due to a large number of results and/or encounters for the requested time period, some results have not been displayed. A complete set of results can be found in Results Review.       Assessment/Plan      Senait Narvaez is a  54 y.o.  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      Patient with CHARLES stage 3 oliguric on CKD, with several previous episodes of CHARLES secondary to SCD pain crisis. Current CHARLES most likely secondary to shock/vasoocclusive crisis/sepsis?. Azotemia continue sraising. However, hyperkalemia remains stable and still oliguric, acid base status stable as well. No indication for dialysis at this time. Nephrology will follow up.      #CHARLES stage 3 oliguric on CKD II (baseline eGFR 72 - Baseline creatinine 0.8-1)  #Severe hyperkalemia resolved  #Metabolic acidosis resolved   - Multifactorial, likely due to shock/venoocclusive crisis/sepsis?  - FeNa 1.2% (9/10)  - Creatinine during admission: 3.32 (9/10)--> 4.07 (9/11) -->5.34 (9/12)  - s/p IV contrast on 9/10 with CT scan  - UA: Protein 1+, leukocyte esterase 500 karina/uL, WBC >50, RBC 6-10  - Urine output last 24 hours: 0.2-0.3 ml/kg/hr (9/11)  - Hyperkalemia: K: 5.6 (9/12)  - Hypocalcemia: 7.7 (9/12)  - Metabolic acidosis: stable, pH: 7.38, HCO3: 23.1 (9/12)  - Anemia: Hb 10.4 (9/12)     #Sickle cell crisis  - 4 pain crisis on current year  ##Hgb SC disease (previously on exchange transfusions q4-6 weeks, last transfusion 3/1/24)  ##Multiorgan failure   - Requiring 2 pressors   ##Hemolytic anemia  #Acute liver injury 2/2 hemodynamic instability versus sickle cell crisis   #Acute  respiratory failure on Mechanical ventilation   #CAN (night oxygen use at home)  #pHTN (6/2023)  #Hx recurrent DVT/PE (on lifelong Coumadin)  #Hx of SVC syndrome     Kidney   - CHARLES on CKD, oliguric, Stage II  - Baseline creatinine: 0.8-1  - Etiology: Multifactorial. Shock, hypotension, vasooclussive crisis   - UA showed: RBC, Protein +1   - Urine electrolyes showed FeNA/FeUrea of  consistent  ATN (1-4) - Clinical volume status: Volume status managed by primary team  - Electrolytes (Na, K, Ca, Phos): Hyperkalemia, corrected stable  - Acid base status: Metabolic acidosis, corrected stable  Hemodynamics  - Blood pressures remained stable/rapidly fluctuating/low need any pressor support during hospital course.   - On HTN meds? Any changes needed?  Avoid nephrotoxins  - Contrast, hyperglycemia, NSAIDs, ischemia (anemia)  Medications (causative)              - Example - Zosyn and vancomycin, NSAIDs, ACE/ARBs, etc.   Medications (adjustments)              - Medication adjustments based off current GFR  Obstruction              -  Renal US: pending      Recommendations:  - Continue supportive care, goal blood pressure MAP>70  - Volume status managed by primary team, diuresis as needed if hypervolemia  - Avoid nephrotoxins, contrast if possible  - strict Is/Os  - Renal dosing for medications for latest eGFR, follow medication trough as appropriate (Vancomycin/Zosyn)  - Avoid hypotension/rapid fluctuations in Bps  - No indication for RRT at present; but consent obtained in event it becomes necessary over coming days     Patient to be staffed with attending physician Dr. Dias.      Jeff Valdivia MD  PGY1 Internal Medicine

## 2024-09-12 NOTE — PROGRESS NOTES
"Medical Intensive Care - Daily Progress Note   Subjective    Senait Narvaez is a 54 y.o. female patient admitted on 9/10/2024 with following ICU needs: hypotension requiring vasopressors and acidosis with inappropriate respiratory compensation requiring intubation    Interval History:  Hyperkalemia overnight, changed Lokelma from TID to q8h (end date 9/12). S/p exchange transfusion. No BM overnight. No acute events.    Meds    Scheduled medications  folic acid, 1 mg, oral, Daily  pantoprazole, 40 mg, intravenous, Daily  piperacillin-tazobactam, 2.25 g, intravenous, q6h  polyethylene glycol, 17 g, oral, BID  sennosides, 2 tablet, oral, BID  sodium zirconium cyclosilicate, 10 g, oral, q8h  [START ON 9/14/2024] thiamine, 100 mg, intravenous, Daily  thiamine, 500 mg, intravenous, TID      Continuous medications  fentaNYL, 0-300 mcg/hr, Last Rate: Stopped (09/12/24 1046)  norepinephrine, 0.01-1 mcg/kg/min, Last Rate: 0.18 mcg/kg/min (09/12/24 1200)  propofol, 5-50 mcg/kg/min, Last Rate: Stopped (09/12/24 1005)  vasopressin, 0.03 Units/min, Last Rate: 0.03 Units/min (09/12/24 1200)      PRN medications  PRN medications: albuterol, alteplase, dextrose, dextrose, fentaNYL, glucagon, glucagon, oxygen, vancomycin     Objective    Blood pressure 101/64, pulse 98, temperature 36.3 °C (97.3 °F), resp. rate 18, height 1.651 m (5' 5\"), weight 123 kg (270 lb 8.1 oz), SpO2 95%.     Temp  Min: 35.7 °C (96.3 °F)  Max: 37 °C (98.6 °F)  Pulse  Min: 85  Max: 109  BP  Min: 92/57  Max: 104/64  Resp  Min: 18  Max: 24  SpO2  Min: 93 %  Max: 100 %    Physical Exam  Constitutional:       Appearance: She is ill-appearing.      Interventions: She is intubated.      Comments: obese, RASS -4   Cardiovascular:      Rate and Rhythm: Normal rate and regular rhythm.      Pulses: Normal pulses.   Pulmonary:      Effort: She is intubated.      Comments: Rhonchorous breath sounds bilaterally, left worse than right  Abdominal:      General: Abdomen is flat. "      Palpations: Abdomen is soft.   Skin:     General: Skin is warm and dry.   Neurological:      Mental Status: She is oriented to person, place, and time.            Intake/Output Summary (Last 24 hours) at 9/12/2024 1224  Last data filed at 9/12/2024 1209  Gross per 24 hour   Intake 3998.29 ml   Output 1126 ml   Net 2872.29 ml     Net IO Since Admission: 6,389.4 mL [09/12/24 1224]     Labs:   Results from last 72 hours   Lab Units 09/12/24 0441 09/11/24 2353 09/11/24 1754   HEMOGLOBIN g/dL 10.4* 10.3* 10.8*   HEMATOCRIT % 28.3* 28.0* 29.5*   WBC AUTO x10*3/uL 21.6* 20.2* 16.8*   PLATELETS AUTO x10*3/uL 79* 85* 83*   NEUTROS PCT AUTO %  --   --  89.8   LYMPHO PCT MAN % 0.0 6.4  --    LYMPHS PCT AUTO %  --   --  1.8   MONO PCT MAN % 6.1 0.0  --    MONOS PCT AUTO %  --   --  6.7   EOSINO PCT MAN % 0.0 0.0  --    EOS PCT AUTO %  --   --  0.2      Results from last 72 hours   Lab Units 09/12/24 0441 09/11/24 2353 09/11/24 1754 09/11/24  1155   SODIUM mmol/L 130* 130* 131* 132*   POTASSIUM mmol/L 5.6* 5.7* 5.6* 4.9   CHLORIDE mmol/L 93* 93* 96* 96*   CO2 mmol/L 21 25 21 17*   BUN mg/dL 70* 66* 67* 61*   CREATININE mg/dL 5.34* 4.82* 4.40* 4.07*   GLUCOSE mg/dL 99 104* 105* 116*   CALCIUM mg/dL 7.7* 7.7* 8.0* 8.0*   ANION GAP mmol/L 22* 18 20 24*   EGFR mL/min/1.73m*2 9* 10* 11* 12*   PHOSPHORUS mg/dL  --  3.8 3.6 4.7        Results from last 72 hours   Lab Units 09/12/24 0441 09/11/24 1754 09/11/24  0504 09/11/24  0035 09/10/24  2123 09/10/24  1912   LD U/L 4,233* 10,552* >12,000*  --   --  >12,000*   TROPHSCMC ng/L  --   --   --  1,186* 1,431* 1,315*        Micro/ID:   Lab Results   Component Value Date    BLOODCULT No growth at 1 day 09/10/2024    BLOODCULT No growth at 1 day 09/10/2024       Summary of key imaging results from the last 24 hours  CT C/A/P w/ contrast 9/11/24: hepatic venous congestion, diffuse mosaic attenuation of the lung parenchyma, which can be seen in the setting of small airway disease,  pulmonary edema or acute infection   CT head wo IV contrast 9/11/24: No acute intracranial abnormality  Liver US with Doppler: the liver size is near the upper limit of normal and diffusely echogenic in appearance, consistent with diffuse fatty infiltration. Unremarkable doppler interrogation of the liver.    Assessment and Plan     Assessment: Senait Narvaez is a 54 y.o. 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 Patient critically ill with hypotension initially requiring 2 pressors, CHARLES, acute liver injury possibly 2/2 congestive hepatopathy, hemolytic anemia requiring transfusions, altered mentation with lethargy and not compensating appropriately respiratory wise for acidosis requiring intubation.    Mechanical Ventilation: < 4 days  Sedation/Analgesia:  Fentanyl and Propofol  Restraints: Restraints indicated as alternative therapies have been attempted and have been ineffective.  Restrain with soft wrist restraints and side rails up x4 until medical devices discontinued and/or patient able to participate with plan of care.     Summary for 09/12/24:  S/p exchange transfusion 6 units PRBCs 9/11 AM, transfusion team on board, no plans for additional exchange transfusion  Continue tube feeds  Spontaneous awakening trial today  Remains on vasopressors, titrate to MAP >65  Consider suppository if no bowel movement this afternoon    Plan:  NEUROLOGY/PSYCH:  #AMS  ::Likely in setting of critical illness with contribution of hypoglycemia  ::Intubated, sedated  ::CTH negative  -c/w fentanyl and propofol (SAT)  -thiamine 500mg TID, c/w thiamine 100mg daily     #Starla  equina with saddle numbness  -no acute interventions    CARDIOVASCULAR:  #Hypotension, septic vs hypovolemic shock  ::Mixed So2 68 demonstrates not entirely septic, may also be hypovolemic vs less likely cardiogenic shock given TTE findings  -continue levo and vaso, titrate to MAP 65+  -S/p 2u pRBCs 9/10  -S/p exchange transfusion 9/11 AM     #Elevated troponins, now downtrending  #EKG with ST depressions, likely Type II MI due to vaso-occlusive crisis and anemia  ::TTE 9/11: EF 60-65%, RV enlarged and reduced in function which appears stable when compared to last TTE (1/2024)  ::Troponin leak is likely due to cardiac demand vs supply mismatch in the setting of worsening anemia and vaso-occlusive crisis  -serial EKGs  -cardiology consulted, now signed off without indication to treat as ACS  -Will consider V/Q scan if respiratory status worsens with a strong suspicion for PE     #Hx SVT  -hold home metoprolol tartrate 12.5mg BID    PULMONARY:  #Intubated  #CAN  #pHTN (6/2023)  #Chronic 2L O2 at night use  ::On 2L O2 on arrival, denying dyspnea and cough  ::CT chest with signs of pulmonary edema  -wean vent settings as tolerated  -albuterol nebulizer for wheezing  -hold home Lasix 20    RENAL/GENITOURINARY:  #CHARLES on CKD II, oliguric phase  #Severe hyperkalemia with previous peaked T waves on EKG, resolving  #Hyponatremia  ::Pre-renal CHARLES likely iso hypotension  ::Baseline cre 0.8-1, now 4.82     ::Now s/p IV contrast on 9/10 with CT scan  ::9/10 OSH UA: turbid, 2+ blood, 1+ protein, 250LE, negative nitrite, rare bacteria, few squamous  ::FeNa 1.2%, consistent with ATN  -monitor BMP q4hrs  -hyperkalemia cocktail as needed  -lokelma 10g q8h  -Follow-up renal ultrasound  -Nephrology consulted, no need for RRT at this time, but family has consented if needed     #Mixed HAGMA/NAGMA with secondary respiratory alkalosis  #Elevated lactate (likely Type A)  -trend ABG and lactate     #Hypocalcemia  -Replete  PRN    GASTROENTEROLOGY:  #Acute liver injury 2/2 hemodynamic instability versus sickle cell crisis  ::On presentation to  MICU ALT 2611, AST 4727, T bili 10.2, INR 3.0, now improving  ::CT with hepatic venous congestion, patient lacks gallbladder  ::Liver US with Doppler: Diffuse fatty infiltration, unremarkable doppler interrogation of the liver.  ::EBV IgG positive, IgM negative  ::CMV IgG positive, PCR negative  ::Acute hepatitis panel unremarkable  ::CK elevated (944)  -trend MELD labs  -Hepatology consulted, appreciate recommendations    #Constipation  -c/w sennosides  -c/w miralax  -Will consider suppository if no bowel bowel movement-this afternoon  -Continue tube feeds    ENDOCRINOLOGY:  #Hypoglycemia  ::Likely iso critical illness  -q4hr glucose checks with hypoglycemia protocol  -start D10 gtt as needed    HEMATOLOGY:  #Sickle cell crisis  #Hgb SC disease (previously on exchange transfusions q4-6 weeks, last transfusion before admission 3/1/24)  #Hemolytic anemia  ::LDH>12,000 (now downtrending), haptoglobin <30, fibrinogen 318, retic % 5.2 on presentation  ::Peripheral smear without helmet cells  -transfusion medicine consulted, appreciate recs  -Exchange transfusion completed 9/11, no plans for additional transfusion at this time  -daily LDH, retics  -Pain management with fentanyl 25mcg PRN     #Hx recurrent DVT/PE (on lifelong Coumadin)  # hx of SVC syndrome  ::Per family, facial/neck appearance on presentation at her baseline  ::S/p FFP 9/11  - Hx SVC stricture s/p SVC angioplasty  - B/l Upper Extremity and Facial Swelling d/t partial filling defect within the SVC and a thrombus of the SVC complicated by bilateral Mediport catheters. On lifelong anticoagulation, DOAC discouraged due to high risk of clotting.  -hold home warfarin 5mg daily, trend INR. Goal 2-3.  -Will continue ongoing discussions with hematology regarding anticoagulation    MUSCULOSKELETAL/ SKIN:  #Fibromyalgia  #Raynaud's  disease  #Muscle spasms  -no active interventions at this time  -holding home flexuril 10mg TID prn muscle spasms due to AMS    INFECTIOUS DISEASE:  #Leukocytosis  #Concern for some component of sepsis  ::Unclear infectious source. No acute findings on CT C/A/P. Patient had denied urinary symptoms prior to intubation.  ::Procal elevated 7.09  ::S/p 2L fluids  :: Blood cultures 9/10 NGTD x 1 day  -c/w vancomycin and zosyn  -Send repeat blood cultures    MISC:  -holding home vitamin C, elderberry, loratidine 10mg, zofran 4mg, oxycodone 10mg q4h prn, MVI    ICU Check List       ICU Liberation: Intervention:   Assess, Prevent, Manage Pain CPOT - Fentanyl bolus PRN   Both SAT and SBT [x] SAT  [] SBT 30-60 min [] Extubate to NC  [] Extubate to NIV  [] Discuss Trach   Choice of analgesia and sedation RASS: 0/-1  Fentanyl and Propofol    Delirium: Assess, prevent and manage CAM -    Early Mobility and Exercise  [x] PT /OT consult   Family Engagement and Empowerment [x]Family updated []SW consult     FEN   Fluids: PRN  Electrolytes: PRN  Nutrition: Enteral feeding with NPO Nepro; 10 (10mls q6hrs until goal rate of 30mls/hr); 200 (cc); Water; Other (specify); Every 8 hours     Prophylaxis:  DVT ppx: SCDs, holding home warfarin (elevated INR)  GI ppx: PPI  Bowel care: MiraLAX BID, senna BID    Hardware:  Catheter: Left internal jugular Trialysis (placed 9/10/24), right femoral CVC (placed 9/11/24)  Access: pIV, OG  Drains: Slater  Lines: L radial Deonna (placed 9/10/24)    Social:  Code: Full Code    HPOA: Tati Salgado (mother) [phone]. Daughter Tiesha ([phone]) also involved with patient's care.  Disposition: MICU pending extubation    Salvador Rosario MD   09/12/24 at 12:24 PM     Disclaimer: Documentation completed with the information available at the time of input. The times in the chart may not be reflective of actual patient care times, interventions, or procedures. Documentation occurs after the physical care of the  patient.

## 2024-09-12 NOTE — HOSPITAL COURSE
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     Per patient's daughter, patient began feeling diffuse body pain lasting for about 1 week. The symptoms were consistent with previous sickle cell pain crises. Patient had received exchange transfusions in the past but were stopped in March 2024 after patient had worsening lethargy post transfusions. Patient then began to have worsening lethargy and aching chest pain on 9/10/2024. She was taken to [facility] ED and subsequently transferred to  MICU for further evaluation.    In the MICU, patient was hemodynamically unstable and given boluses of fluids while started on levo and vasopressin. Patient was subsequently intubated due to worsening lethargic and lack of respiratory compensation from significant metabolic acidosis. Vancomycin and Zosyn started. Patient was then transfused 2 units pRBCs.    Patient underwent exchange transfusion on 9/11. Cardiology consulted given concerns for NSTEMI versus demand ischemia with ST depressions on EKG and elevated troponins. TTE showing EF 60- 65% with RV enlargement and reduced function that appeared stable when compared to previous TTE. Cardiology recommended no further indication to treat as ACS. Patient had severe CHARLES with peak creatinine around 5.4. No hemodialysis was required per nephrology. Finally hepatology consulted given worsening LFTs in the setting of severe acute liver injury. Liver ultrasound with Doppler only remarkable for diffuse fatty infiltration. Remainder of viral hepatitis workup returned negative. Both CHARLES and acute liver injury improved with monitoring and supportive  management.    Patient began to have pruritic rash first noticed on the bilateral breasts on 9/14. Dermatology consulted, skin biopsies taken on 9/15. Rash then began to worsen to involve the groin, lateral thigh, and scalp with numerous tense bullae. Discontinued heparin given concerns for potential HIT, started bivalirudin. Patient was extubated on 9/16/2024. Started stress dose steroids given continued pressor requirements. Pressors were then weaned and discontinued on 9/17. Stress dose steroids discontinued as well. Patient stable and appropriate for transfer to the floor.    Patient transferred to [facility] 9/17, PT/OT rec SNF. Coumadin bridge was started 9/18. Given persistent rash with unremarkable labs, derm re-biopsied on 9/18, findings ultimately felt to be most c/w fixed drug eruption. Dermatology contacted for bx results (9/20) rec triamcinolone cream BID and interdry cloths. Leukocytosis uptrending 9/21, repeat infectious workup negative (CXR, Abd XR, blood cultures, MRSA negative 9/21). UA with leuks (9/21). On 9/25, per nursing and patient, rash seems to be worsening. Derm re-engaged recommended to apply vasoline to all eroded/denuded areas and continue triamcinolone cream to both the black plaques as well as the red areas across the trunk and thighs. 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 Wound care following. For warfarin dosing, per pharmacy, if INR between 2-3 can restart home warfarin 5mg daily, if INR < 2 restart bival. INR 3.4 on 9/25, warfarin stopped, repeat INR 2.8 9/26 and warfarin restarted. On 9/26, increased PO oxycodone and discontinued IV dilaudid in anticipation for discharge.

## 2024-09-12 NOTE — CARE PLAN
Problem: Safety - Medical Restraint  Goal: Remains free of injury from restraints (Restraint for Interference with Medical Device)  Outcome: Progressing  Goal: Free from restraint(s) (Restraint for Interference with Medical Device)  Outcome: Progressing     Problem: Safety - Adult  Goal: Free from fall injury  Outcome: Progressing     Problem: Skin  Goal: Participates in plan/prevention/treatment measures  Outcome: Progressing  Flowsheets (Taken 9/12/2024 0409)  Participates in plan/prevention/treatment measures: Elevate heels  Goal: Prevent/manage excess moisture  Outcome: Progressing  Flowsheets (Taken 9/12/2024 0409)  Prevent/manage excess moisture: Moisturize dry skin  Goal: Prevent/minimize sheer/friction injuries  Outcome: Progressing  Flowsheets (Taken 9/12/2024 0409)  Prevent/minimize sheer/friction injuries:   Complete micro-shifts as needed if patient unable. Adjust patient position to relieve pressure points, not a full turn   Increase activity/out of bed for meals   Use pull sheet   HOB 30 degrees or less   Turn/reposition every 2 hours/use positioning/transfer devices   Utilize specialty bed per algorithm  Goal: Promote/optimize nutrition  Outcome: Progressing  Flowsheets (Taken 9/12/2024 0409)  Promote/optimize nutrition: Monitor/record intake including meals  Goal: Promote skin healing  Outcome: Progressing  Flowsheets (Taken 9/12/2024 0409)  Promote skin healing:   Assess skin/pad under line(s)/device(s)   Protective dressings over bony prominences   Turn/reposition every 2 hours/use positioning/transfer devices   Ensure correct size (line/device) and apply per  instructions   Rotate device position/do not position patient on device     Problem: Knowledge Deficit  Goal: Patient/family/caregiver demonstrates understanding of disease process, treatment plan, medications, and discharge instructions  Outcome: Progressing     Problem: Mechanical Ventilation  Goal: Patient Will Maintain  Patent Airway  Outcome: Progressing  Goal: Oral health is maintained or improved  Outcome: Progressing  Goal: ET tube will be managed safely  Outcome: Progressing     Problem: Pain  Goal: Turns in bed with improved pain control throughout the shift  Outcome: Progressing

## 2024-09-12 NOTE — PROGRESS NOTES
"[facility]  [facility]  CONSULT FOLLOW-UP     Reason For Consult  Elevated LFTs     SUBJECTIVE     Patient remains intubated on sedation this morning NE 0.2 & vaso 0.03 on exam.     She received exchange transfusion yesterday.     EXAM     Last Recorded Vitals  Blood pressure 101/64, pulse 97, temperature 36.3 °C (97.3 °F), resp. rate 18, height 1.651 m (5' 5\"), weight 123 kg (270 lb 8.1 oz), SpO2 95%.      Intake/Output Summary (Last 24 hours) at 9/12/2024 1131  Last data filed at 9/12/2024 1100  Gross per 24 hour   Intake 6050.09 ml   Output 3134 ml   Net 2916.09 ml       Physical Exam   General: intubated and sedated  HEENT: PERRLA, positive scleral icterus, moist MM  Respiratory: mechanical breath sounds  Cardiovascular: RRR  Abdomen: Soft, no fluid wave appreciated  Extremities: trace edema  Neuro: sedated reflexes present       OBJECTIVE                                                                              Medications             Current Facility-Administered Medications:     albuterol 2.5 mg /3 mL (0.083 %) nebulizer solution 2.5 mg, 2.5 mg, nebulization, q6h PRN, Aster Puga MD    alteplase (Cathflo Activase) injection 2 mg, 2 mg, intra-catheter, PRN, Mandy Tijerina MD    dextrose 50 % injection 12.5 g, 12.5 g, intravenous, q15 min PRN, Aster Puga MD, 12.5 g at 09/11/24 0815    dextrose 50 % injection 25 g, 25 g, intravenous, q15 min PRN, Aster Puga MD, 25 g at 09/10/24 2304    fentanyl (Sublimaze) 1000 mcg in sodium chloride 0.9% 100 mL (10 mcg/mL) infusion (premix), 0-300 mcg/hr, intravenous, Continuous, Aster Puga MD, Stopped at 09/12/24 1046    fentaNYL bolus from bag 25 mcg, 25 mcg, intravenous, Once, Aster Puga MD    fentaNYL bolus from bag 25 mcg, 25 mcg, intravenous, q10 min PRN, Aster Puga MD, 25 mcg at 09/11/24 1543    folic acid (Folvite) tablet 1 mg, 1 mg, oral, Daily, Aster Puga MD, 1 mg at " 09/12/24 0922    glucagon (Glucagen) injection 1 mg, 1 mg, intramuscular, q15 min PRN, Aster Puga MD    glucagon (Glucagen) injection 1 mg, 1 mg, intramuscular, q15 min PRN, Aster Puga MD    norepinephrine (Levophed) 8 mg in dextrose 5% 250 mL (0.032 mg/mL) infusion (premix), 0.01-1 mcg/kg/min, intravenous, Continuous, Aster Puga MD, Last Rate: 35.1 mL/hr at 09/12/24 1127, 0.18 mcg/kg/min at 09/12/24 1127    oxygen (O2) therapy, , inhalation, Continuous PRN - O2/gases, Marcellus Carlson MD, 30 percent at 09/11/24 1934    pantoprazole (ProtoNix) injection 40 mg, 40 mg, intravenous, Daily, Aster Puga MD, 40 mg at 09/12/24 0922    perflutren protein A microsphere (Optison) injection 0.5 mL, 0.5 mL, intravenous, Once in imaging, Edy Lemon MD    perflutren protein A microsphere (Optison) injection 0.5 mL, 0.5 mL, intravenous, Once in imaging, Aster Puga MD    piperacillin-tazobactam (Zosyn) 2.25 g in dextrose (iso) IV 50 mL, 2.25 g, intravenous, q6h, Mandy Tijerina MD, Last Rate: 0 mL/hr at 09/12/24 0535, 2.25 g at 09/12/24 1125    polyethylene glycol (Glycolax, Miralax) packet 17 g, 17 g, oral, BID, Mandy Tijerina MD, 17 g at 09/12/24 0922    propofol (Diprivan) infusion, 5-50 mcg/kg/min, intravenous, Continuous, Aster Puga MD, Stopped at 09/12/24 1005    sennosides (Senokot) tablet 17.2 mg, 2 tablet, oral, BID, Mandy Tijerina MD, 17.2 mg at 09/12/24 0922    sodium zirconium cyclosilicate (Lokelma) packet 10 g, 10 g, oral, q8h, Linda Carranza MD, 10 g at 09/12/24 0457    sulfur hexafluoride microsphr (Lumason) injection 24.28 mg, 2 mL, intravenous, Once in imaging, Edy Lemon MD    sulfur hexafluoride microsphr (Lumason) injection 24.28 mg, 2 mL, intravenous, Once in imaging, Aster Puga MD    [START ON 9/14/2024] thiamine (Vitamin B1) injection 100 mg, 100 mg, intravenous, Daily, Aster Puga MD    thiamine 500 mg in dextrose 5% 100 mL IV, 500  mg, intravenous, TID, Aster Puga MD, Stopped at 09/12/24 1005    vancomycin (Vancocin) pharmacy to dose - pharmacy monitoring, , miscellaneous, Daily PRN, Aster Puga MD    vasopressin (Vasostrict) 0.2 unit/mL in 5% dextrose 100 mL IV, 0.03 Units/min, intravenous, Continuous, Aster Puga MD, Last Rate: 9 mL/hr at 09/12/24 1100, 0.03 Units/min at 09/12/24 1100                                                                            Labs     CBC RFP   Lab Results   Component Value Date    WBC 21.6 (H) 09/12/2024    HGB 10.4 (L) 09/12/2024    HCT 28.3 (L) 09/12/2024    MCV 81 09/12/2024    PLT 79 (L) 09/12/2024    NEUTROABS 15.03 (H) 09/11/2024    Lab Results   Component Value Date     (L) 09/12/2024    K 5.6 (H) 09/12/2024    CL 93 (L) 09/12/2024    CO2 21 09/12/2024    BUN 70 (H) 09/12/2024    CREATININE 5.34 (H) 09/12/2024     Lab Results   Component Value Date    MG 2.03 09/12/2024    PHOS 3.8 09/11/2024    CALCIUM 7.7 (L) 09/12/2024    ALBUMIN 2.5 (L) 09/12/2024         Hepatic Function ABG/VBG   Lab Results   Component Value Date    ALT 2,735 (H) 09/12/2024    AST 3,236 (H) 09/12/2024    ALKPHOS 262 (H) 09/12/2024     Lab Results   Component Value Date    BILIDIR 4.1 (H) 09/12/2024      Lab Results   Component Value Date    PROTIME 27.1 (H) 09/12/2024    APTT 31 09/12/2024    INR 2.4 (H) 09/12/2024    ALBUMINELP 3.9 02/16/2022    Lab Results   Component Value Date    LACTATE 0.9 08/30/2019                                                                              Imaging     === 09/10/24 ===    US ABDOMEN COMPLETE    - Impression -  1. The liver size is near the upper limit of normal and diffusely  echogenic in appearance, consistent with diffuse fatty infiltration.  2. Unremarkable doppler interrogation of the liver.    === 09/10/24 ===     CT CHEST ABDOMEN PELVIS W IV CONTRAST     - Impression -  1. Heterogeneous mottled mosaic pattern of enhancement is present  within the hepatic  parenchyma, which can be seen in the setting of  hepatic venous congestion.  2. Diffuse mosaic attenuation of the lung parenchyma, which can be  seen in the setting of small airway disease, pulmonary edema or acute  infection.  3. Additional findings as above.                                                                           GI Procedures     none    ASSESSMENT / PLAN     ASSESSMENT/PLAN:    Senait Narvaez is a 54 y.o. 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 Hepatology is consulted for Elevated LFTs.     Her LFTs were elevated from prior on initial ED presentation 9/10 likely iso vaso-occlusive crisis, she then developed shock with marked hypotension BP 69/54 requiring pressor support and since then her LFTs have continued to rise. Workup including Acute hepatitis panel negative, acetaminophen level 10.1, alcohol <10, salicylate <3, .  CT AP noted mottled mosaic pattern of enhancement is present within the hepatic parenchyma, which can be seen in the setting of hepatic venous congestion and patient does have newly reduced RV function. Currently suspect her LFTs are elevated as a result of multiple factors including ischemic hepatitis (shock liver), vaso-occlusive crisis, and hepatic congestion from possible new HF, LFTs have started to downtrend. Doppler ultrasound of liver negative for vascular occlusion.     #Ischemic Hepatitis   #Elevated LFTs   Recommendations   -avoid hepatotoxic drugs   -maintain adequate BP with MAP >65   -trend daily hepatic  function panel   -further management of shock per primary team     Patient was seen & discussed with Dr. Coffman .    Thank you for the consultation. Hepatology will continue to follow.  During weekday hours of 7am-5pm, please do not hesitate to contact me on [employer] Chat or page [phone], if there are any further questions.  After hours, on weekends, and on holidays, please page the on-call GI fellow at [phone].   Thank you.     Steffi Telles MD  PGY4 Gastroenterology Fellow  [facility]

## 2024-09-12 NOTE — PROGRESS NOTES
Spiritual Care Visit    Clinical Encounter Type  Visited With: Patient and family together  Routine Visit: Follow-up  Continue Visiting: Yes  Referral From: Physician  Referral To:     Taxonomy  Intended Effects: Build relationship of care and support, Demonstrate caring and concern, Establish rapport and connectedness  Methods: Collaborate with care team member, Encourage sharing of feelings, Offer support  Interventions: Acknowledge current situation, Active listening, Discuss coping mechanisms with someone, [place]     introduced self and role to patient Senait Narvaez's daughter, Juvenal. Patient was intubated and sleeping at time of visit. Daughter shared that she is patient's only child and reflected on the difficulty of seeing patient this ill. She shared she does have good support especially from patient's mother and that she is caring for herself as best she can.      spoke briefly to patient, who opened her eyes and acknowledged .  provided care through reflective listening, supportive conversation, and prayer. Daughter was appreciative of visit and did not have any further needs at this time. Spiritual Care remains available as needed/requested.    Rev. Anita Pandya MDiv, BCC

## 2024-09-12 NOTE — CARE PLAN
The patient's goals for the shift include      The clinical goals for the shift include Patient will have decreasing vasopressor requirements this shift      Problem: Safety - Medical Restraint  Goal: Remains free of injury from restraints (Restraint for Interference with Medical Device)  Outcome: Progressing  Goal: Free from restraint(s) (Restraint for Interference with Medical Device)  Outcome: Progressing     Problem: Pain - Adult  Goal: Verbalizes/displays adequate comfort level or baseline comfort level  Outcome: Progressing     Problem: Safety - Adult  Goal: Free from fall injury  Outcome: Progressing     Problem: Skin  Goal: Decreased wound size/increased tissue granulation at next dressing change  Outcome: Progressing  Flowsheets (Taken 9/12/2024 0752)  Decreased wound size/increased tissue granulation at next dressing change:   Promote sleep for wound healing   Protective dressings over bony prominences   Utilize specialty bed per algorithm  Goal: Participates in plan/prevention/treatment measures  Outcome: Progressing  Flowsheets (Taken 9/12/2024 0752)  Participates in plan/prevention/treatment measures: Elevate heels  Goal: Prevent/manage excess moisture  Outcome: Progressing  Flowsheets (Taken 9/12/2024 0752)  Prevent/manage excess moisture:   Cleanse incontinence/protect with barrier cream   Follow provider orders for dressing changes   Moisturize dry skin   Monitor for/manage infection if present  Goal: Prevent/minimize sheer/friction injuries  Outcome: Progressing  Flowsheets (Taken 9/12/2024 0752)  Prevent/minimize sheer/friction injuries:   Complete micro-shifts as needed if patient unable. Adjust patient position to relieve pressure points, not a full turn   Turn/reposition every 2 hours/use positioning/transfer devices   Use pull sheet   HOB 30 degrees or less   Utilize specialty bed per algorithm  Goal: Promote/optimize nutrition  Outcome: Progressing  Flowsheets (Taken 9/12/2024  3760)  Promote/optimize nutrition: Discuss with provider if NPO > 2 days  Goal: Promote skin healing  Outcome: Progressing  Flowsheets (Taken 9/12/2024 0753)  Promote skin healing:   Assess skin/pad under line(s)/device(s)   Rotate device position/do not position patient on device   Turn/reposition every 2 hours/use positioning/transfer devices   Ensure correct size (line/device) and apply per  instructions   Protective dressings over bony prominences     Problem: Knowledge Deficit  Goal: Patient/family/caregiver demonstrates understanding of disease process, treatment plan, medications, and discharge instructions  Outcome: Progressing     Problem: Mechanical Ventilation  Goal: Patient Will Maintain Patent Airway  Outcome: Progressing  Goal: Oral health is maintained or improved  Outcome: Progressing     Problem: Pain  Goal: Turns in bed with improved pain control throughout the shift  Outcome: Progressing     Problem: Nutrition  Goal: Tube feed tolerance  Outcome: Progressing

## 2024-09-12 NOTE — PROGRESS NOTES
Vancomycin Dosing by Pharmacy- FOLLOW UP    Senait Narvaez is a 54 y.o. year old female who Pharmacy has been consulted for vancomycin dosing for bloodstream infection. Based on the patient's indication and renal status this patient is being dosed based on a goal trough/random level of 15-20.     Renal function is currently declining. Follow nephrology recommendations for changes in renal plan    Current vancomycin dose: 2000 mg x1 dose on  at 0100    Most recent trough level: 19.7 mcg/mL    Visit Vitals  /64   Pulse 87   Temp 36.3 °C (97.3 °F) (Temporal)   Resp 24        Lab Results   Component Value Date    CREATININE 4.82 (H) 2024    CREATININE 4.40 (H) 2024    CREATININE 4.07 (H) 2024    CREATININE 3.97 (H) 2024        Patient weight is as follows:   Vitals:    09/10/24 2000   Weight: 110 kg (242 lb 8.1 oz)       Cultures:  No results found for the encounter in last 14 days.       I/O last 3 completed shifts:  In: 7526.6 (68.4 mL/kg) [I.V.:639.6 (5.8 mL/kg); Blood:650; Other:7; NG/GT:100; IV Piggyback:4050]  Out: 2864 (26 mL/kg) [Urine:651 (0.2 mL/kg/hr); Emesis/NG output:90; Other:3]  Weight: 110 kg   I/O during current shift:  I/O this shift:  In: 1472.6 [I.V.:828.6; Blood:289; NG/GT:200; IV Piggyback:155]  Out: 260 [Urine:260]    Temp (24hrs), Av.3 °C (97.4 °F), Min:35.7 °C (96.3 °F), Max:36.9 °C (98.4 °F)      Assessment/Plan    Within goal random/trough level  Will hold dose since patient is therapuetic and recheck a 24 hour level.  The next level will be obtained on  at AM labs. May be obtained sooner if clinically indicated.   Will continue to monitor renal function daily while on vancomycin and order serum creatinine at least every 48 hours if not already ordered.  Follow for continued vancomycin needs, clinical response, and signs/symptoms of toxicity.       Marino Kirby, MeenaD

## 2024-09-12 NOTE — PROGRESS NOTES
Name: Senait Narvaez  MRN: [id number]  Encounter Date: 9/12/2024  PCP: No Assigned PCP Generic Provider, MD  Heme-Onc: Dr. Hill     Reason for consult: Hx of SC disease, shock with multiorgan system failure, now s/p exchange transfusion   Attending Provider Amanda LU     Hematology/ Oncology Consult Daily Progress Note    Overnight Events   Worsening renal function.     Patient opening eyes today and turning head. Oxygen requirements have not changed and vasopressor requirements have decreased slightly.    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 Patient previously dependent on red cell exchange transfusion which was on hold per patient preference. Hx of avascular necrosis of R hip      From discussion with mother on 9/12-- She was previously having exchanges q4-6 weeks, but suffered extreme pain after exchanges and often required hospitalizations after them. She would feel better around 10 days after exchange. The repeated burden and pain of exchanges was becoming too much for her, so she decided to take a break, discussed with Dr. Whaley. She has not had an exchange since March. Mother reports she has had multiple ICU admissions requiring intubation, most recent she believes in 2017, cannot recall the one before that.    From 12/06/2023 Heme Onc note-   She was diagnosed with SVC syndrome, she had Bilateral Upper Extremity and Facial Swelling d/t partial filling defect within the SVC and a thrombus of the SVC complicated by bilateral Mediport catheters. Vasc med consulted, rec lifelong coumadin. She was on heparin drip bridge and coumadin was initiated on 1/16. INR 3.0 on 1/29/2019. She is status post Venogram, SVC balloon angioplasty and Right mediport removal (1/15/20) and status post placement of left IJ temporary apharesis catheter, SVC angiogram, Balloon angioplasty of SVC/left brachiocephalic vein, Removal of left sided Mediport catheter (1/21/20). She was given IV diuretics lasix  "- for UE edema 2/2 SVC syndrome with edema. Breast remain swollen recommended breast binder. SVC in 2022 with stent placed.      H&P from 2018 refers to multiorgan failure x2 with hepatopathy (once in Dec 2017)    Review of Systems   Unable to obtain    Allergies   No Known Allergies    Medications     folic acid, 1 mg, Daily  pantoprazole, 40 mg, Daily  piperacillin-tazobactam, 2.25 g, q6h  polyethylene glycol, 17 g, BID  sennosides, 2 tablet, BID  sodium zirconium cyclosilicate, 10 g, q8h  [START ON 2024] thiamine, 100 mg, Daily  thiamine, 500 mg, TID      fentaNYL, Last Rate: Stopped (24 1046)  norepinephrine, Last Rate: 0.18 mcg/kg/min (24 1200)  propofol, Last Rate: Stopped (24 1005)  vasopressin, Last Rate: 0.03 Units/min (24 1200)      albuterol, 2.5 mg, q6h PRN  alteplase, 2 mg, PRN  dextrose, 12.5 g, q15 min PRN  dextrose, 25 g, q15 min PRN  fentaNYL, 25 mcg, q10 min PRN  glucagon, 1 mg, q15 min PRN  glucagon, 1 mg, q15 min PRN  oxygen, , Continuous PRN - O2/gases  vancomycin, , Daily PRN        Physical Exam   Blood pressure 101/64, pulse 98, temperature 36.3 °C (97.3 °F), resp. rate 18, height 1.651 m (5' 5\"), weight 123 kg (270 lb 8.1 oz), SpO2 95%.    ECO    Gen: intubated, sedated  HEENT: AT/NC  CV: unable to auscultate over ventilator  Pulm: rhonchi bilaterally  Abd: soft, NT/ND, no organomegaly  Ext: no LE edema  Skin: warm and dry       Labs     Lab Results   Component Value Date    GLUCOSE 99 2024    CALCIUM 7.7 (L) 2024     (L) 2024    K 5.6 (H) 2024    CO2 21 2024    CL 93 (L) 2024    BUN 70 (H) 2024    CREATININE 5.34 (H) 2024       Lab Results   Component Value Date    WBC 21.6 (H) 2024    HGB 10.4 (L) 2024    HCT 28.3 (L) 2024    MCV 81 2024    PLT 79 (L) 2024       Lab Results   Component Value Date    ALT 2,735 (H) 2024    AST 3,236 (H) 2024    " ALKPHOS 262 (H) 09/12/2024    BILITOT 9.3 (H) 09/12/2024      Latest Reference Range & Units Most Recent   POCT pH, Arterial 7.38 - 7.42 pH 7.44 (H)  9/11/24 16:31   POCT pCO2, Arterial 38 - 42 mm Hg 28 (L)  9/11/24 16:31   POCT pO2, Arterial 85 - 95 mm Hg 138 (H)  9/11/24 16:31   POCT SO2, Arterial 94 - 100 % 99  9/11/24 16:31   POCT Oxy Hemoglobin, Arterial 94.0 - 98.0 % 95.7  9/11/24 16:31   POCT Hematocrit Calculated, Arterial 36.0 - 46.0 % 33.0 (L)  9/11/24 16:31   POCT Sodium, Arterial 136 - 145 mmol/L 127 (L)  9/11/24 16:31   POCT Potassium, Arterial 3.5 - 5.3 mmol/L 5.7 (H)  9/11/24 16:31   POCT Chloride, Arterial 98 - 107 mmol/L 100  9/11/24 16:31   POCT Ionized Calcium, Arterial 1.10 - 1.33 mmol/L 1.04 (L)  9/11/24 16:31   POCT Glucose, Arterial 74 - 99 mg/dL 90  9/11/24 16:31   POCT Lactate, Arterial 0.4 - 2.0 mmol/L 3.9 (H)  9/11/24 16:31   POCT Base Excess, Arterial -2.0 - 3.0 mmol/L -4.1 (L)  9/11/24 16:31   POCT HCO3 Calculated, Arterial 22.0 - 26.0 mmol/L 19.0 (L)  9/11/24 16:31   POCT Hemoglobin, Arterial 12.0 - 16.0 g/dL 11.1 (L)  9/11/24 16:31   POCT Anion Gap, Arterial 10 - 25 mmo/L 14  9/11/24 16:31   (H): Data is abnormally high  (L): Data is abnormally low   Latest Reference Range & Units 09/12/24 04:41   WBC 4.4 - 11.3 x10*3/uL 21.6 (H)   nRBC 0.0 - 0.0 /100 WBCs 356.4 (H)   RBC 4.00 - 5.20 x10*6/uL 3.50 (L)   HEMOGLOBIN 12.0 - 16.0 g/dL 10.4 (L)   HEMATOCRIT 36.0 - 46.0 % 28.3 (L)   MCV 80 - 100 fL 81   MCH 26.0 - 34.0 pg 29.7   MCHC 32.0 - 36.0 g/dL 36.7 (H)   RED CELL DISTRIBUTION WIDTH 11.5 - 14.5 % 20.0 (H)   Platelets 150 - 450 x10*3/uL 79 (L)   Immature Granulocytes %, Automated 0.0 - 0.9 % 1.6 (H)   Immature Granulocytes Absolute, Automated 0.00 - 0.70 x10*3/uL 0.34   Neutrophils %, Manual 40.0 - 80.0 % 34.7   Bands %, Manual 0.0 - 5.0 % 49.0   Lymphocytes %, Manual 13.0 - 44.0 % 0.0   Monocytes %, Manual 2.0 - 10.0 % 6.1   Eosinophils %, Manual 0.0 - 6.0 % 0.0   Basophils %, Manual  0.0 - 2.0 % 0.0   Metamyelocytes % 0.0 - 0.0 % 4.1   Myelocytes %, Manual 0.0 - 0.0 % 6.1   Seg Neutrophils Absolute, Manual 1.20 - 7.00 x10*3/uL 7.50 (H)   Bands Absolute, Manual 0.00 - 0.70 x10*3/uL 10.58 (H)   Lymphocytes Absolute, Manual 1.20 - 4.80 x10*3/uL 0.00 (L)   Monocytes Absolute, Manual 0.10 - 1.00 x10*3/uL 1.32 (H)   Eosinophils Absolute, Manual 0.00 - 0.70 x10*3/uL 0.00   Basophils Absolute, Manual 0.00 - 0.10 x10*3/uL 0.00   Metamyelocytes Absolute 0.00 - 0.00 x10*3/uL 0.89   Myelocytes Absolute 0.00 - 0.00 x10*3/uL 1.32   Total Cells Counted  49   Neutrophils Absolute, Manual 1.20 - 7.70 x10*3/uL 18.08 (H)   RBC Morphology  See Below   Polychromasia  Mild   RBC Fragments  Few   Sickle Cells  Few   Target Cells  Few   Teardrop Cells  Few   [place] Cells  Few   Acanthocytes  Few   Basophilic Stippling  Present   (H): Data is abnormally high  (L): Data is abnormally low  Imaging       Assessment/Plan     Senait Narvaez is a 54 y.o. 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 Patient was transferred to [facility] MICU and subsequently had hypotension requiring vasopressors and acidosis with inadequate respiratory compensation requiring intubation.      NORA is negative, and review of blood smear did not show schistocytes or other evidence of hemolysis. Hemolysis does not fit with clinical picture and there is no evidence that patient has had hemolytic reactions previously. 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     Etiology for new onset hypoxia is unclear in this patient, as it could be an early or atypical presentation of acute chest syndrome,  or pulmonary embolism. Infection also cannot be ruled out. Patient is severely thrombocytopenic, and warfarin has not been reversed. Primary team can consider reversal.     Patient is s/p exchange transfusion (6u pRBC)and 2u pRBC, which should help prevent further sickling and its sequelae. Hgb S is well below goal of 30% of total Hgb. We recommend CBCs, reticulocyte counts, LDH, and DIC panel daily.    9/12 Update: Continue supportive care. No indication for repeat exchange at this time. Patient is s/p temporary warfarin reversal with plasma.    Patient seen and discussed with attending physician, Dr. Hill, who agrees with the above.       Irene Jarrett, MS4    Hematology Consult Pager: [phone]  Oncology Consult Pager: [phone]

## 2024-09-13 ENCOUNTER — APPOINTMENT (OUTPATIENT)
Dept: RADIOLOGY | Facility: HOSPITAL | Age: 54
End: 2024-09-13
Payer: COMMERCIAL

## 2024-09-13 ENCOUNTER — APPOINTMENT (OUTPATIENT)
Dept: CARDIOLOGY | Facility: HOSPITAL | Age: 54
DRG: 811 | End: 2024-09-13
Payer: COMMERCIAL

## 2024-09-13 LAB
ALBUMIN SERPL BCP-MCNC: 2.3 G/DL (ref 3.4–5)
ALP SERPL-CCNC: 219 U/L (ref 33–110)
ALP SERPL-CCNC: 229 U/L (ref 33–110)
ALP SERPL-CCNC: 233 U/L (ref 33–110)
ALPHA GAL IGE, VIRC: <0.1 KU/L
ALT SERPL W P-5'-P-CCNC: 1290 U/L (ref 7–45)
ALT SERPL W P-5'-P-CCNC: 1585 U/L (ref 7–45)
ALT SERPL W P-5'-P-CCNC: 1710 U/L (ref 7–45)
ANION GAP BLDV CALCULATED.4IONS-SCNC: 12 MMOL/L (ref 10–25)
ANION GAP SERPL CALC-SCNC: 16 MMOL/L (ref 10–20)
ANION GAP SERPL CALC-SCNC: 17 MMOL/L (ref 10–20)
ANION GAP SERPL CALC-SCNC: 18 MMOL/L (ref 10–20)
ANION GAP SERPL CALC-SCNC: 19 MMOL/L (ref 10–20)
APTT PPP: 32 SECONDS (ref 27–38)
APTT PPP: 34 SECONDS (ref 27–38)
AST SERPL W P-5'-P-CCNC: 1118 U/L (ref 9–39)
AST SERPL W P-5'-P-CCNC: 581 U/L (ref 9–39)
AST SERPL W P-5'-P-CCNC: 875 U/L (ref 9–39)
BACTERIA SPEC RESP CULT: NORMAL
BASE EXCESS BLDV CALC-SCNC: -3 MMOL/L (ref -2–3)
BEEF IGE, VIRC: <0.1 KU/L
BILIRUB DIRECT SERPL-MCNC: 5 MG/DL (ref 0–0.3)
BILIRUB DIRECT SERPL-MCNC: 5.8 MG/DL (ref 0–0.3)
BILIRUB DIRECT SERPL-MCNC: 6.5 MG/DL (ref 0–0.3)
BILIRUB SERPL-MCNC: 8.7 MG/DL (ref 0–1.2)
BILIRUB SERPL-MCNC: 9.3 MG/DL (ref 0–1.2)
BILIRUB SERPL-MCNC: 9.8 MG/DL (ref 0–1.2)
BODY TEMPERATURE: 37 DEGREES CELSIUS
BUN SERPL-MCNC: 64 MG/DL (ref 6–23)
BUN SERPL-MCNC: 67 MG/DL (ref 6–23)
CA-I BLDV-SCNC: 0.95 MMOL/L (ref 1.1–1.33)
CALCIUM SERPL-MCNC: 6.9 MG/DL (ref 8.6–10.6)
CALCIUM SERPL-MCNC: 7 MG/DL (ref 8.6–10.6)
CALCIUM SERPL-MCNC: 7 MG/DL (ref 8.6–10.6)
CALCIUM SERPL-MCNC: 7.2 MG/DL (ref 8.6–10.6)
CHLORIDE BLDV-SCNC: 96 MMOL/L (ref 98–107)
CHLORIDE SERPL-SCNC: 93 MMOL/L (ref 98–107)
CHLORIDE SERPL-SCNC: 93 MMOL/L (ref 98–107)
CHLORIDE SERPL-SCNC: 94 MMOL/L (ref 98–107)
CHLORIDE SERPL-SCNC: 94 MMOL/L (ref 98–107)
CLASS BEEF, VIRC: 0
CLASS LAMB, VIRC: 0
CLASS PORK, VIRC: 0
CO2 SERPL-SCNC: 22 MMOL/L (ref 21–32)
CO2 SERPL-SCNC: 23 MMOL/L (ref 21–32)
CREAT SERPL-MCNC: 5.07 MG/DL (ref 0.5–1.05)
CREAT SERPL-MCNC: 5.08 MG/DL (ref 0.5–1.05)
CREAT SERPL-MCNC: 5.24 MG/DL (ref 0.5–1.05)
CREAT SERPL-MCNC: 5.27 MG/DL (ref 0.5–1.05)
EGFRCR SERPLBLD CKD-EPI 2021: 10 ML/MIN/1.73M*2
EGFRCR SERPLBLD CKD-EPI 2021: 9 ML/MIN/1.73M*2
ERYTHROCYTE [DISTWIDTH] IN BLOOD BY AUTOMATED COUNT: 22.2 % (ref 11.5–14.5)
ERYTHROCYTE [DISTWIDTH] IN BLOOD BY AUTOMATED COUNT: 22.9 % (ref 11.5–14.5)
ERYTHROCYTE [DISTWIDTH] IN BLOOD BY AUTOMATED COUNT: 23.2 % (ref 11.5–14.5)
GLUCOSE BLD MANUAL STRIP-MCNC: 112 MG/DL (ref 74–99)
GLUCOSE BLD MANUAL STRIP-MCNC: 113 MG/DL (ref 74–99)
GLUCOSE BLD MANUAL STRIP-MCNC: 116 MG/DL (ref 74–99)
GLUCOSE BLD MANUAL STRIP-MCNC: 121 MG/DL (ref 74–99)
GLUCOSE BLD MANUAL STRIP-MCNC: 121 MG/DL (ref 74–99)
GLUCOSE BLD MANUAL STRIP-MCNC: 136 MG/DL (ref 74–99)
GLUCOSE BLDV-MCNC: 131 MG/DL (ref 74–99)
GLUCOSE SERPL-MCNC: 102 MG/DL (ref 74–99)
GLUCOSE SERPL-MCNC: 118 MG/DL (ref 74–99)
GLUCOSE SERPL-MCNC: 135 MG/DL (ref 74–99)
GLUCOSE SERPL-MCNC: 92 MG/DL (ref 74–99)
GRAM STN SPEC: NORMAL
GRAM STN SPEC: NORMAL
HAPTOGLOB SERPL NEPH-MCNC: <30 MG/DL (ref 30–200)
HCO3 BLDV-SCNC: 20.8 MMOL/L (ref 22–26)
HCT VFR BLD AUTO: 26.1 % (ref 36–46)
HCT VFR BLD AUTO: 26.4 % (ref 36–46)
HCT VFR BLD AUTO: 27 % (ref 36–46)
HCT VFR BLD EST: 31 % (ref 36–46)
HGB BLD-MCNC: 9 G/DL (ref 12–16)
HGB BLD-MCNC: 9.2 G/DL (ref 12–16)
HGB BLD-MCNC: 9.4 G/DL (ref 12–16)
HGB BLDV-MCNC: 10.2 G/DL (ref 12–16)
HGB RETIC QN: 31 PG (ref 28–38)
IMMATURE RETIC FRACTION: 28.9 %
INHALED O2 CONCENTRATION: 30 %
INR PPP: 1.4 (ref 0.9–1.1)
INR PPP: 1.5 (ref 0.9–1.1)
LACTATE BLDV-SCNC: 2 MMOL/L (ref 0.4–2)
LAMB IGE, VIRC: <0.1 KU/L
LDH SERPL L TO P-CCNC: 1611 U/L (ref 84–246)
MAGNESIUM SERPL-MCNC: 2.39 MG/DL (ref 1.6–2.4)
MAGNESIUM SERPL-MCNC: 2.55 MG/DL (ref 1.6–2.4)
MAGNESIUM SERPL-MCNC: 2.64 MG/DL (ref 1.6–2.4)
MCH RBC QN AUTO: 29.3 PG (ref 26–34)
MCH RBC QN AUTO: 29.8 PG (ref 26–34)
MCH RBC QN AUTO: 30.1 PG (ref 26–34)
MCHC RBC AUTO-ENTMCNC: 34.1 G/DL (ref 32–36)
MCHC RBC AUTO-ENTMCNC: 34.5 G/DL (ref 32–36)
MCHC RBC AUTO-ENTMCNC: 35.6 G/DL (ref 32–36)
MCV RBC AUTO: 84 FL (ref 80–100)
MCV RBC AUTO: 86 FL (ref 80–100)
MCV RBC AUTO: 87 FL (ref 80–100)
NRBC BLD-RTO: 254.3 /100 WBCS (ref 0–0)
NRBC BLD-RTO: 277.8 /100 WBCS (ref 0–0)
NRBC BLD-RTO: 300 /100 WBCS (ref 0–0)
OXYHGB MFR BLDV: 81.6 % (ref 45–75)
PCO2 BLDV: 32 MM HG (ref 41–51)
PH BLDV: 7.42 PH (ref 7.33–7.43)
PLATELET # BLD AUTO: 57 X10*3/UL (ref 150–450)
PLATELET # BLD AUTO: 66 X10*3/UL (ref 150–450)
PLATELET # BLD AUTO: 77 X10*3/UL (ref 150–450)
PO2 BLDV: 55 MM HG (ref 35–45)
PORK IGE, VIRC: <0.1 KU/L
POTASSIUM BLDV-SCNC: 4.3 MMOL/L (ref 3.5–5.3)
POTASSIUM SERPL-SCNC: 3.9 MMOL/L (ref 3.5–5.3)
POTASSIUM SERPL-SCNC: 4 MMOL/L (ref 3.5–5.3)
POTASSIUM SERPL-SCNC: 4.5 MMOL/L (ref 3.5–5.3)
POTASSIUM SERPL-SCNC: 4.7 MMOL/L (ref 3.5–5.3)
PROT SERPL-MCNC: 4.2 G/DL (ref 6.4–8.2)
PROT SERPL-MCNC: 4.4 G/DL (ref 6.4–8.2)
PROT SERPL-MCNC: 4.6 G/DL (ref 6.4–8.2)
PROTHROMBIN TIME: 15.8 SECONDS (ref 9.8–12.8)
PROTHROMBIN TIME: 16.9 SECONDS (ref 9.8–12.8)
RBC # BLD AUTO: 2.99 X10*6/UL (ref 4–5.2)
RBC # BLD AUTO: 3.14 X10*6/UL (ref 4–5.2)
RBC # BLD AUTO: 3.15 X10*6/UL (ref 4–5.2)
RETICS #: 0.4 X10*6/UL (ref 0.02–0.08)
RETICS/RBC NFR AUTO: 12.8 % (ref 0.5–2)
SAO2 % BLDV: 85 % (ref 45–75)
SODIUM BLDV-SCNC: 124 MMOL/L (ref 136–145)
SODIUM SERPL-SCNC: 129 MMOL/L (ref 136–145)
SODIUM SERPL-SCNC: 130 MMOL/L (ref 136–145)
UFH PPP CHRO-ACNC: 0.1 IU/ML
VANCOMYCIN TROUGH SERPL-MCNC: 18.6 UG/ML (ref 5–20)
WBC # BLD AUTO: 23.7 X10*3/UL (ref 4.4–11.3)
WBC # BLD AUTO: 24.5 X10*3/UL (ref 4.4–11.3)
WBC # BLD AUTO: 24.8 X10*3/UL (ref 4.4–11.3)

## 2024-09-13 PROCEDURE — 82435 ASSAY OF BLOOD CHLORIDE: CPT

## 2024-09-13 PROCEDURE — 93005 ELECTROCARDIOGRAM TRACING: CPT

## 2024-09-13 PROCEDURE — 2500000001 HC RX 250 WO HCPCS SELF ADMINISTERED DRUGS (ALT 637 FOR MEDICARE OP)

## 2024-09-13 PROCEDURE — 83615 LACTATE (LD) (LDH) ENZYME: CPT

## 2024-09-13 PROCEDURE — 99221 1ST HOSP IP/OBS SF/LOW 40: CPT | Performed by: STUDENT IN AN ORGANIZED HEALTH CARE EDUCATION/TRAINING PROGRAM

## 2024-09-13 PROCEDURE — 85027 COMPLETE CBC AUTOMATED: CPT

## 2024-09-13 PROCEDURE — 37799 UNLISTED PX VASCULAR SURGERY: CPT

## 2024-09-13 PROCEDURE — 2500000004 HC RX 250 GENERAL PHARMACY W/ HCPCS (ALT 636 FOR OP/ED)

## 2024-09-13 PROCEDURE — 2020000001 HC ICU ROOM DAILY

## 2024-09-13 PROCEDURE — 84075 ASSAY ALKALINE PHOSPHATASE: CPT

## 2024-09-13 PROCEDURE — 99291 CRITICAL CARE FIRST HOUR: CPT

## 2024-09-13 PROCEDURE — 99232 SBSQ HOSP IP/OBS MODERATE 35: CPT

## 2024-09-13 PROCEDURE — 2500000005 HC RX 250 GENERAL PHARMACY W/O HCPCS

## 2024-09-13 PROCEDURE — 80202 ASSAY OF VANCOMYCIN: CPT

## 2024-09-13 PROCEDURE — 71045 X-RAY EXAM CHEST 1 VIEW: CPT

## 2024-09-13 PROCEDURE — 71045 X-RAY EXAM CHEST 1 VIEW: CPT | Performed by: RADIOLOGY

## 2024-09-13 PROCEDURE — 93010 ELECTROCARDIOGRAM REPORT: CPT | Performed by: INTERNAL MEDICINE

## 2024-09-13 PROCEDURE — 83735 ASSAY OF MAGNESIUM: CPT

## 2024-09-13 PROCEDURE — 84132 ASSAY OF SERUM POTASSIUM: CPT

## 2024-09-13 PROCEDURE — 85610 PROTHROMBIN TIME: CPT

## 2024-09-13 PROCEDURE — 82810 BLOOD GASES O2 SAT ONLY: CPT

## 2024-09-13 PROCEDURE — 84460 ALANINE AMINO (ALT) (SGPT): CPT

## 2024-09-13 PROCEDURE — 85520 HEPARIN ASSAY: CPT

## 2024-09-13 PROCEDURE — 85045 AUTOMATED RETICULOCYTE COUNT: CPT

## 2024-09-13 PROCEDURE — 83010 ASSAY OF HAPTOGLOBIN QUANT: CPT

## 2024-09-13 PROCEDURE — 82947 ASSAY GLUCOSE BLOOD QUANT: CPT

## 2024-09-13 PROCEDURE — 80048 BASIC METABOLIC PNL TOTAL CA: CPT

## 2024-09-13 PROCEDURE — 80053 COMPREHEN METABOLIC PANEL: CPT

## 2024-09-13 PROCEDURE — 94003 VENT MGMT INPAT SUBQ DAY: CPT

## 2024-09-13 RX ORDER — VANCOMYCIN HYDROCHLORIDE 500 MG/100ML
500 INJECTION, SOLUTION INTRAVENOUS ONCE
Status: COMPLETED | OUTPATIENT
Start: 2024-09-13 | End: 2024-09-13

## 2024-09-13 RX ORDER — OXYCODONE HYDROCHLORIDE 10 MG/1
10 TABLET ORAL EVERY 6 HOURS
Status: DISCONTINUED | OUTPATIENT
Start: 2024-09-13 | End: 2024-09-17

## 2024-09-13 RX ORDER — OXYCODONE HYDROCHLORIDE 5 MG/1
10 TABLET ORAL EVERY 4 HOURS PRN
Status: DISCONTINUED | OUTPATIENT
Start: 2024-09-13 | End: 2024-09-13

## 2024-09-13 RX ORDER — HEPARIN SODIUM 10000 [USP'U]/100ML
0-4000 INJECTION, SOLUTION INTRAVENOUS CONTINUOUS
Status: DISCONTINUED | OUTPATIENT
Start: 2024-09-13 | End: 2024-09-16

## 2024-09-13 ASSESSMENT — PAIN SCALES - GENERAL
PAINLEVEL_OUTOF10: 0 - NO PAIN

## 2024-09-13 ASSESSMENT — PAIN - FUNCTIONAL ASSESSMENT

## 2024-09-13 NOTE — PROGRESS NOTES
Communication Note    Patient Name: Senait Narvaez  MRN: [id number]  Today's Date: 9/13/2024   Room: 17/17-A    Discipline: Physical Therapy      Missed Visit: Yes  Missed Visit Reason:  (PT evaluation reviewed, patient wtih increased pressor support and unable to tolerate vent off sedation.  Will continue to monitor and follow up as medically appropriate.)      09/13/24 at 1:09 PM   Mahendra Cook, PT   Rehab Office: [phone]

## 2024-09-13 NOTE — SIGNIFICANT EVENT
Senait Narvaez is a 54 y.o. 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 Hepatology is consulted for Elevated LFTs.     Her LFTs were elevated from prior on initial ED presentation 9/10 likely iso vaso-occlusive crisis, she then developed shock with marked hypotension BP 69/54 requiring pressor support and since then her LFTs have continued to rise. Workup including Acute hepatitis panel negative, acetaminophen level 10.1, alcohol <10, salicylate <3, .  CT AP noted mottled mosaic pattern of enhancement is present within the hepatic parenchyma, which can be seen in the setting of hepatic venous congestion and patient does have newly reduced RV function. Currently suspect her LFTs are elevated as a result of multiple factors including ischemic hepatitis (shock liver), vaso-occlusive crisis, and hepatic congestion from possible new HF, LFTs have started to downtrend. Doppler ultrasound of liver negative for vascular occlusion. She is still requiring pressors and ischemic event (hypotension) is still ongoing thus transaminases are still elevated but slowly down trending.      #Ischemic Hepatitis   #Elevated LFTs   Recommendations   -avoid hepatotoxic drugs   -maintain adequate BP with MAP >65   -trend daily hepatic function panel   -further management of shock per primary team           Thank you for the consultation.  The Hepatology consulting team will sign off now.  Please do not hesitate to contact us again on by paging the consultation team again between the  weekday hours of 7 AM - 5 PM.  If there is an urgent concern during the weekend, after-hours, or holidays; then please page the [place] GI [place] [phone]. Thank you.

## 2024-09-13 NOTE — PROGRESS NOTES
Name: Senait Narvaez  MRN: [id number]  Encounter Date: 9/13/2024  PCP: No Assigned PCP Generic Provider, MD  Heme-Onc: Dr. Hill     Reason for consult: Hx of SC disease, shock with multiorgan system failure, now s/p exchange transfusion   Attending Provider Amanda LU     Hematology/ Oncology Consult Daily Progress Note    Overnight Events   Worsening renal function.     Patient opening eyes today and turning head. Oxygen requirements have decreased. Still on two pressors.    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 Patient previously dependent on red cell exchange transfusion which was on hold per patient preference. Hx of avascular necrosis of R hip      From discussion with mother on 9/12-- She was previously having exchanges q4-6 weeks, but suffered extreme pain after exchanges and often required hospitalizations after them. She would feel better around 10 days after exchange. The repeated burden and pain of exchanges was becoming too much for her, so she decided to take a break, discussed with Dr. Whaley. She has not had an exchange since March. Mother reports she has had multiple ICU admissions requiring intubation, most recent she believes in 2017, cannot recall the one before that.    From 12/06/2023 Heme Onc note-   She was diagnosed with SVC syndrome, she had Bilateral Upper Extremity and Facial Swelling d/t partial filling defect within the SVC and a thrombus of the SVC complicated by bilateral Mediport catheters. Vasc med consulted, rec lifelong coumadin. She was on heparin drip bridge and coumadin was initiated on 1/16. INR 3.0 on 1/29/2019. She is status post Venogram, SVC balloon angioplasty and Right mediport removal (1/15/20) and status post placement of left IJ temporary apharesis catheter, SVC angiogram, Balloon angioplasty of SVC/left brachiocephalic vein, Removal of left sided Mediport catheter (1/21/20). She was given IV diuretics lasix 2/26-29 for UE edema 2/2 SVC syndrome  "with edema. Breast remain swollen recommended breast binder. SVC in 2022 with stent placed.      H&P from 2018 refers to multiorgan failure x2 with hepatopathy (once in Dec 2017)    Review of Systems   Unable to obtain    Allergies   No Known Allergies    Medications     folic acid, 1 mg, Daily  pantoprazole, 40 mg, Daily  piperacillin-tazobactam, 2.25 g, q6h  polyethylene glycol, 17 g, BID  sennosides, 2 tablet, BID  [START ON 2024] thiamine, 100 mg, Daily  thiamine, 500 mg, TID      fentaNYL, Last Rate: 50 mcg/hr (24 1100)  norepinephrine, Last Rate: 0.16 mcg/kg/min (24 1328)  propofol, Last Rate: Stopped (24 1005)  vasopressin, Last Rate: 0.03 Units/min (24 1100)      albuterol, 2.5 mg, q6h PRN  alteplase, 2 mg, PRN  dextrose, 12.5 g, q15 min PRN  dextrose, 25 g, q15 min PRN  fentaNYL, 25 mcg, q10 min PRN  glucagon, 1 mg, q15 min PRN  glucagon, 1 mg, q15 min PRN  oxyCODONE, 10 mg, q4h PRN  oxygen, , Continuous PRN - O2/gases        Physical Exam   Blood pressure 130/55, pulse 85, temperature 36.4 °C (97.5 °F), resp. rate 15, height 1.651 m (5' 5\"), weight 123 kg (271 lb 9.7 oz), SpO2 96%.    ECO    Gen: intubated, sedated  HEENT: AT/NC  CV: unable to auscultate over ventilator  Pulm: rhonchi bilaterally  Abd: soft, NT/ND, no organomegaly  Ext: no LE edema  Skin: warm and dry       Labs     Lab Results   Component Value Date    GLUCOSE 135 (H) 2024    CALCIUM 7.0 (L) 2024     (L) 2024    K 3.9 2024    CO2 23 2024    CL 94 (L) 2024    BUN 64 (H) 2024    CREATININE 5.08 (H) 2024       Lab Results   Component Value Date    WBC 23.7 (H) 2024    HGB 9.0 (L) 2024    HCT 26.1 (L) 2024    MCV 87 2024    PLT 66 (L) 2024       Lab Results   Component Value Date    ALT 1,290 (H) 2024     (H) 2024    ALKPHOS 219 (H) 2024    BILITOT 9.8 (H) 2024      Latest Reference Range " & Units Most Recent   POCT pH, Arterial 7.38 - 7.42 pH 7.44 (H)  9/11/24 16:31   POCT pCO2, Arterial 38 - 42 mm Hg 28 (L)  9/11/24 16:31   POCT pO2, Arterial 85 - 95 mm Hg 138 (H)  9/11/24 16:31   POCT SO2, Arterial 94 - 100 % 99  9/11/24 16:31   POCT Oxy Hemoglobin, Arterial 94.0 - 98.0 % 95.7  9/11/24 16:31   POCT Hematocrit Calculated, Arterial 36.0 - 46.0 % 33.0 (L)  9/11/24 16:31   POCT Sodium, Arterial 136 - 145 mmol/L 127 (L)  9/11/24 16:31   POCT Potassium, Arterial 3.5 - 5.3 mmol/L 5.7 (H)  9/11/24 16:31   POCT Chloride, Arterial 98 - 107 mmol/L 100  9/11/24 16:31   POCT Ionized Calcium, Arterial 1.10 - 1.33 mmol/L 1.04 (L)  9/11/24 16:31   POCT Glucose, Arterial 74 - 99 mg/dL 90  9/11/24 16:31   POCT Lactate, Arterial 0.4 - 2.0 mmol/L 3.9 (H)  9/11/24 16:31   POCT Base Excess, Arterial -2.0 - 3.0 mmol/L -4.1 (L)  9/11/24 16:31   POCT HCO3 Calculated, Arterial 22.0 - 26.0 mmol/L 19.0 (L)  9/11/24 16:31   POCT Hemoglobin, Arterial 12.0 - 16.0 g/dL 11.1 (L)  9/11/24 16:31   POCT Anion Gap, Arterial 10 - 25 mmo/L 14  9/11/24 16:31   (H): Data is abnormally high  (L): Data is abnormally low   Latest Reference Range & Units 09/12/24 04:41   WBC 4.4 - 11.3 x10*3/uL 21.6 (H)   nRBC 0.0 - 0.0 /100 WBCs 356.4 (H)   RBC 4.00 - 5.20 x10*6/uL 3.50 (L)   HEMOGLOBIN 12.0 - 16.0 g/dL 10.4 (L)   HEMATOCRIT 36.0 - 46.0 % 28.3 (L)   MCV 80 - 100 fL 81   MCH 26.0 - 34.0 pg 29.7   MCHC 32.0 - 36.0 g/dL 36.7 (H)   RED CELL DISTRIBUTION WIDTH 11.5 - 14.5 % 20.0 (H)   Platelets 150 - 450 x10*3/uL 79 (L)   Immature Granulocytes %, Automated 0.0 - 0.9 % 1.6 (H)   Immature Granulocytes Absolute, Automated 0.00 - 0.70 x10*3/uL 0.34   Neutrophils %, Manual 40.0 - 80.0 % 34.7   Bands %, Manual 0.0 - 5.0 % 49.0   Lymphocytes %, Manual 13.0 - 44.0 % 0.0   Monocytes %, Manual 2.0 - 10.0 % 6.1   Eosinophils %, Manual 0.0 - 6.0 % 0.0   Basophils %, Manual 0.0 - 2.0 % 0.0   Metamyelocytes % 0.0 - 0.0 % 4.1   Myelocytes %, Manual 0.0 - 0.0 %  6.1   Seg Neutrophils Absolute, Manual 1.20 - 7.00 x10*3/uL 7.50 (H)   Bands Absolute, Manual 0.00 - 0.70 x10*3/uL 10.58 (H)   Lymphocytes Absolute, Manual 1.20 - 4.80 x10*3/uL 0.00 (L)   Monocytes Absolute, Manual 0.10 - 1.00 x10*3/uL 1.32 (H)   Eosinophils Absolute, Manual 0.00 - 0.70 x10*3/uL 0.00   Basophils Absolute, Manual 0.00 - 0.10 x10*3/uL 0.00   Metamyelocytes Absolute 0.00 - 0.00 x10*3/uL 0.89   Myelocytes Absolute 0.00 - 0.00 x10*3/uL 1.32   Total Cells Counted  49   Neutrophils Absolute, Manual 1.20 - 7.70 x10*3/uL 18.08 (H)   RBC Morphology  See Below   Polychromasia  Mild   RBC Fragments  Few   Sickle Cells  Few   Target Cells  Few   Teardrop Cells  Few   [place] Cells  Few   Acanthocytes  Few   Basophilic Stippling  Present   (H): Data is abnormally high  (L): Data is abnormally low  Imaging       Assessment/Plan     Senait Narvaez is a 54 y.o. 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 Patient was transferred to [facility] MICU and subsequently had hypotension requiring vasopressors and acidosis with inadequate respiratory compensation requiring intubation.      NORA is negative, and review of blood smear did not show schistocytes or other evidence of hemolysis. Hemolysis does not fit with clinical picture and there is no evidence that patient has had hemolytic reactions previously. 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     Etiology for new onset hypoxia is unclear in this patient, as it could be an early or atypical presentation of acute chest syndrome, or pulmonary embolism. Infection also cannot be ruled out. Patient is severely  thrombocytopenic, and warfarin has not been reversed. Primary team can consider reversal.     Patient is s/p exchange transfusion (6u pRBC)and 2u pRBC, which should help prevent further sickling and its sequelae. Hgb S is well below goal of 30% of total Hgb. We recommend CBCs, reticulocyte counts, LDH, and DIC panel daily.    9/13 Update: Continue supportive care. No indication for repeat exchange at this time. We recommend a heparin drip (no boluses) following stroke protocol aiming for a lower threshold therapeutic level.    Patient seen and discussed with attending physician, Dr. Hill, who agrees with the above.       Irene Jarrett, MS4  Hematology Consult Pager: [phone]  Oncology Consult Pager: [phone]

## 2024-09-13 NOTE — CARE PLAN
Problem: Safety - Medical Restraint  Goal: Remains free of injury from restraints (Restraint for Interference with Medical Device)  Outcome: Progressing  Goal: Free from restraint(s) (Restraint for Interference with Medical Device)  Outcome: Progressing     Problem: Skin  Goal: Prevent/minimize sheer/friction injuries  Outcome: Progressing  Flowsheets (Taken 9/12/2024 0752 by Bouchra Harp RN)  Prevent/minimize sheer/friction injuries:   Complete micro-shifts as needed if patient unable. Adjust patient position to relieve pressure points, not a full turn   Turn/reposition every 2 hours/use positioning/transfer devices   Use pull sheet   HOB 30 degrees or less   Utilize specialty bed per algorithm  Goal: Promote skin healing  Outcome: Progressing  Flowsheets (Taken 9/12/2024 0752 by Bouchra Harp RN)  Promote skin healing:   Assess skin/pad under line(s)/device(s)   Rotate device position/do not position patient on device   Turn/reposition every 2 hours/use positioning/transfer devices   Ensure correct size (line/device) and apply per  instructions   Protective dressings over bony prominences   The clinical goals for the shift include pt will have decreased pressor need

## 2024-09-13 NOTE — PROGRESS NOTES
Senait Narvaez is a 54 y.o. female on day 3 of admission presenting with No Principal Problem: There is no principal problem currently on the Problem List. Please update the Problem List and refresh..      Subjective   No events overnight. Patient opens ayes when called by her name, still sedated under mechanical ventilation.        Objective     Last Recorded Vitals  /55   Pulse 89   Temp 36.4 °C (97.5 °F)   Resp 17   Wt 123 kg (271 lb 9.7 oz)   SpO2 95%   Intake/Output last 3 Shifts:    Intake/Output Summary (Last 24 hours) at 9/13/2024 1552  Last data filed at 9/13/2024 1300  Gross per 24 hour   Intake 2463.55 ml   Output 1435 ml   Net 1028.55 ml       Admission Weight  Weight: 110 kg (242 lb 8.1 oz) (09/10/24 2000)    Daily Weight  09/13/24 : 123 kg (271 lb 9.7 oz)    Image Results  XR chest 1 view  Narrative: Interpreted By:  Matthew Jean-Baptiste,   STUDY:  XR CHEST 1 VIEW; 9/13/2024 9:27 am      INDICATION:  Signs/Symptoms:eval infiltrates, intubated.      COMPARISON:  09/11/2024.      ACCESSION NUMBER(S):  [id number]      ORDERING CLINICIAN:  KWAKU IVERSON      FINDINGS:  Endotracheal tube in satisfactory position.      CARDIOMEDIASTINAL SILHOUETTE:  Cardiomegaly versus pericardial effusion. Prominence of central  pulmonary arteries and correlate with a component of pulmonary artery  hypertension.      LUNGS:  Increase in perihilar edema/effusions. No pneumothorax.      ABDOMEN:  No remarkable upper abdominal findings.      BONES:  No acute osseous changes.      Impression: 1.  Increase in perihilar edema/effusions and correlate with fluid  and cardiac status.          Signed by: Matthew Jean-Baptiste 9/13/2024 10:55 AM  Dictation workstation:   [id number]      Physical Exam  Constitutional:       Comments: Sedation RASS -1. Barely open eyes when called by her name.    HENT:      Head: Normocephalic.   Cardiovascular:      Rate and Rhythm: Normal rate and regular rhythm.   Pulmonary:      Effort: Pulmonary  effort is normal.      Breath sounds: Normal breath sounds.   Abdominal:      General: Abdomen is flat.      Palpations: Abdomen is soft.   Skin:     Capillary Refill: Capillary refill takes less than 2 seconds.   Neurological:      Comments: RASS -1       Relevant Results          Scheduled medications  folic acid, 1 mg, oral, Daily  pantoprazole, 40 mg, intravenous, Daily  piperacillin-tazobactam, 2.25 g, intravenous, q6h  polyethylene glycol, 17 g, oral, BID  sennosides, 2 tablet, oral, BID  [START ON 9/14/2024] thiamine, 100 mg, intravenous, Daily  thiamine, 500 mg, intravenous, TID      Continuous medications  fentaNYL, 0-300 mcg/hr, Last Rate: 50 mcg/hr (09/13/24 1300)  norepinephrine, 0.01-1 mcg/kg/min, Last Rate: 0.16 mcg/kg/min (09/13/24 1328)  propofol, 5-50 mcg/kg/min, Last Rate: Stopped (09/12/24 1005)  vasopressin, 0.03 Units/min, Last Rate: 0.03 Units/min (09/13/24 1300)      PRN medications  PRN medications: albuterol, alteplase, dextrose, dextrose, fentaNYL, glucagon, glucagon, oxyCODONE, oxygen  Results for orders placed or performed during the hospital encounter of 09/10/24 (from the past 24 hour(s))   POCT GLUCOSE   Result Value Ref Range    POCT Glucose 99 74 - 99 mg/dL   Basic metabolic panel   Result Value Ref Range    Glucose 107 (H) 74 - 99 mg/dL    Sodium 129 (L) 136 - 145 mmol/L    Potassium 4.8 3.5 - 5.3 mmol/L    Chloride 93 (L) 98 - 107 mmol/L    Bicarbonate 24 21 - 32 mmol/L    Anion Gap 17 10 - 20 mmol/L    Urea Nitrogen 68 (H) 6 - 23 mg/dL    Creatinine 5.42 (H) 0.50 - 1.05 mg/dL    eGFR 9 (L) >60 mL/min/1.73m*2    Calcium 7.3 (L) 8.6 - 10.6 mg/dL   CBC   Result Value Ref Range    WBC 23.1 (H) 4.4 - 11.3 x10*3/uL    nRBC 328.2 (H) 0.0 - 0.0 /100 WBCs    RBC 3.19 (L) 4.00 - 5.20 x10*6/uL    Hemoglobin 9.4 (L) 12.0 - 16.0 g/dL    Hematocrit 27.2 (L) 36.0 - 46.0 %    MCV 85 80 - 100 fL    MCH 29.5 26.0 - 34.0 pg    MCHC 34.6 32.0 - 36.0 g/dL    RDW 21.7 (H) 11.5 - 14.5 %    Platelets 74  (L) 150 - 450 x10*3/uL   Coagulation Screen   Result Value Ref Range    Protime 20.6 (H) 9.8 - 12.8 seconds    INR 1.8 (H) 0.9 - 1.1    aPTT 34 27 - 38 seconds   Hepatic function panel   Result Value Ref Range    Albumin 2.4 (L) 3.4 - 5.0 g/dL    Bilirubin, Total 8.5 (H) 0.0 - 1.2 mg/dL    Bilirubin, Direct 5.0 (H) 0.0 - 0.3 mg/dL    Alkaline Phosphatase 247 (H) 33 - 110 U/L    ALT 1,767 (H) 7 - 45 U/L    AST 1,449 (H) 9 - 39 U/L    Total Protein 4.7 (L) 6.4 - 8.2 g/dL   Magnesium   Result Value Ref Range    Magnesium 1.89 1.60 - 2.40 mg/dL   Phosphorus   Result Value Ref Range    Phosphorus 5.1 (H) 2.5 - 4.9 mg/dL   POCT GLUCOSE   Result Value Ref Range    POCT Glucose 109 (H) 74 - 99 mg/dL   Basic metabolic panel   Result Value Ref Range    Glucose 102 (H) 74 - 99 mg/dL    Sodium 129 (L) 136 - 145 mmol/L    Potassium 4.7 3.5 - 5.3 mmol/L    Chloride 94 (L) 98 - 107 mmol/L    Bicarbonate 22 21 - 32 mmol/L    Anion Gap 18 10 - 20 mmol/L    Urea Nitrogen 67 (H) 6 - 23 mg/dL    Creatinine 5.24 (H) 0.50 - 1.05 mg/dL    eGFR 9 (L) >60 mL/min/1.73m*2    Calcium 6.9 (L) 8.6 - 10.6 mg/dL   CBC   Result Value Ref Range    WBC 24.4 (H) 4.4 - 11.3 x10*3/uL    nRBC 302.9 (H) 0.0 - 0.0 /100 WBCs    RBC 3.11 (L) 4.00 - 5.20 x10*6/uL    Hemoglobin 9.3 (L) 12.0 - 16.0 g/dL    Hematocrit 25.6 (L) 36.0 - 46.0 %    MCV 82 80 - 100 fL    MCH 29.9 26.0 - 34.0 pg    MCHC 36.3 (H) 32.0 - 36.0 g/dL    RDW 21.3 (H) 11.5 - 14.5 %    Platelets 74 (L) 150 - 450 x10*3/uL   Coagulation Screen   Result Value Ref Range    Protime 19.1 (H) 9.8 - 12.8 seconds    INR 1.7 (H) 0.9 - 1.1    aPTT 34 27 - 38 seconds   Hepatic function panel   Result Value Ref Range    Albumin 2.3 (L) 3.4 - 5.0 g/dL    Bilirubin, Total 8.7 (H) 0.0 - 1.2 mg/dL    Bilirubin, Direct 5.0 (H) 0.0 - 0.3 mg/dL    Alkaline Phosphatase 229 (H) 33 - 110 U/L    ALT 1,710 (H) 7 - 45 U/L    AST 1,118 (H) 9 - 39 U/L    Total Protein 4.2 (L) 6.4 - 8.2 g/dL   Magnesium   Result Value Ref  Range    Magnesium 2.64 (H) 1.60 - 2.40 mg/dL   POCT GLUCOSE   Result Value Ref Range    POCT Glucose 111 (H) 74 - 99 mg/dL   POCT GLUCOSE   Result Value Ref Range    POCT Glucose 113 (H) 74 - 99 mg/dL   Lactate dehydrogenase   Result Value Ref Range    LDH 1,611 (H) 84 - 246 U/L   Reticulocytes   Result Value Ref Range    Retic % 12.8 (H) 0.5 - 2.0 %    Retic Absolute 0.404 (H) 0.018 - 0.083 x10*6/uL    Reticulocyte Hemoglobin 31 28 - 38 pg    Immature Retic fraction 28.9 (H) <=16.0 %   Vancomycin, Trough   Result Value Ref Range    Vancomycin, Trough 18.6 5.0 - 20.0 ug/mL   Haptoglobin   Result Value Ref Range    Haptoglobin <30 (L) 30 - 200 mg/dL   Basic metabolic panel   Result Value Ref Range    Glucose 92 74 - 99 mg/dL    Sodium 130 (L) 136 - 145 mmol/L    Potassium 4.5 3.5 - 5.3 mmol/L    Chloride 93 (L) 98 - 107 mmol/L    Bicarbonate 23 21 - 32 mmol/L    Anion Gap 19 10 - 20 mmol/L    Urea Nitrogen 67 (H) 6 - 23 mg/dL    Creatinine 5.27 (H) 0.50 - 1.05 mg/dL    eGFR 9 (L) >60 mL/min/1.73m*2    Calcium 7.0 (L) 8.6 - 10.6 mg/dL   CBC   Result Value Ref Range    WBC 24.5 (H) 4.4 - 11.3 x10*3/uL    nRBC 300.0 (H) 0.0 - 0.0 /100 WBCs    RBC 3.15 (L) 4.00 - 5.20 x10*6/uL    Hemoglobin 9.4 (L) 12.0 - 16.0 g/dL    Hematocrit 26.4 (L) 36.0 - 46.0 %    MCV 84 80 - 100 fL    MCH 29.8 26.0 - 34.0 pg    MCHC 35.6 32.0 - 36.0 g/dL    RDW 22.2 (H) 11.5 - 14.5 %    Platelets 77 (L) 150 - 450 x10*3/uL   Coagulation Screen   Result Value Ref Range    Protime 16.9 (H) 9.8 - 12.8 seconds    INR 1.5 (H) 0.9 - 1.1    aPTT 34 27 - 38 seconds   Hepatic function panel   Result Value Ref Range    Albumin 2.3 (L) 3.4 - 5.0 g/dL    Bilirubin, Total 9.3 (H) 0.0 - 1.2 mg/dL    Bilirubin, Direct 5.8 (H) 0.0 - 0.3 mg/dL    Alkaline Phosphatase 233 (H) 33 - 110 U/L    ALT 1,585 (H) 7 - 45 U/L     (H) 9 - 39 U/L    Total Protein 4.4 (L) 6.4 - 8.2 g/dL   Magnesium   Result Value Ref Range    Magnesium 2.55 (H) 1.60 - 2.40 mg/dL   POCT  GLUCOSE   Result Value Ref Range    POCT Glucose 121 (H) 74 - 99 mg/dL   Basic metabolic panel   Result Value Ref Range    Glucose 135 (H) 74 - 99 mg/dL    Sodium 129 (L) 136 - 145 mmol/L    Potassium 3.9 3.5 - 5.3 mmol/L    Chloride 94 (L) 98 - 107 mmol/L    Bicarbonate 23 21 - 32 mmol/L    Anion Gap 16 10 - 20 mmol/L    Urea Nitrogen 64 (H) 6 - 23 mg/dL    Creatinine 5.08 (H) 0.50 - 1.05 mg/dL    eGFR 9 (L) >60 mL/min/1.73m*2    Calcium 7.0 (L) 8.6 - 10.6 mg/dL   CBC   Result Value Ref Range    WBC 23.7 (H) 4.4 - 11.3 x10*3/uL    nRBC 277.8 (H) 0.0 - 0.0 /100 WBCs    RBC 2.99 (L) 4.00 - 5.20 x10*6/uL    Hemoglobin 9.0 (L) 12.0 - 16.0 g/dL    Hematocrit 26.1 (L) 36.0 - 46.0 %    MCV 87 80 - 100 fL    MCH 30.1 26.0 - 34.0 pg    MCHC 34.5 32.0 - 36.0 g/dL    RDW 22.9 (H) 11.5 - 14.5 %    Platelets 66 (L) 150 - 450 x10*3/uL   Coagulation Screen   Result Value Ref Range    Protime 15.8 (H) 9.8 - 12.8 seconds    INR 1.4 (H) 0.9 - 1.1    aPTT 32 27 - 38 seconds   Hepatic function panel   Result Value Ref Range    Albumin 2.3 (L) 3.4 - 5.0 g/dL    Bilirubin, Total 9.8 (H) 0.0 - 1.2 mg/dL    Bilirubin, Direct 6.5 (H) 0.0 - 0.3 mg/dL    Alkaline Phosphatase 219 (H) 33 - 110 U/L    ALT 1,290 (H) 7 - 45 U/L     (H) 9 - 39 U/L    Total Protein 4.6 (L) 6.4 - 8.2 g/dL   Magnesium   Result Value Ref Range    Magnesium 2.39 1.60 - 2.40 mg/dL   Blood Gas Venous Full Panel   Result Value Ref Range    POCT pH, Venous 7.42 7.33 - 7.43 pH    POCT pCO2, Venous 32 (L) 41 - 51 mm Hg    POCT pO2, Venous 55 (H) 35 - 45 mm Hg    POCT SO2, Venous 85 (H) 45 - 75 %    POCT Oxy Hemoglobin, Venous 81.6 (H) 45.0 - 75.0 %    POCT Hematocrit Calculated, Venous 31.0 (L) 36.0 - 46.0 %    POCT Sodium, Venous 124 (L) 136 - 145 mmol/L    POCT Potassium, Venous 4.3 3.5 - 5.3 mmol/L    POCT Chloride, Venous 96 (L) 98 - 107 mmol/L    POCT Ionized Calicum, Venous 0.95 (L) 1.10 - 1.33 mmol/L    POCT Glucose, Venous 131 (H) 74 - 99 mg/dL    POCT  Lactate, Venous 2.0 0.4 - 2.0 mmol/L    POCT Base Excess, Venous -3.0 (L) -2.0 - 3.0 mmol/L    POCT HCO3 Calculated, Venous 20.8 (L) 22.0 - 26.0 mmol/L    POCT Hemoglobin, Venous 10.2 (L) 12.0 - 16.0 g/dL    POCT Anion Gap, Venous 12.0 10.0 - 25.0 mmol/L    Patient Temperature 37.0 degrees Celsius    FiO2 30 %   POCT GLUCOSE   Result Value Ref Range    POCT Glucose 136 (H) 74 - 99 mg/dL     *Note: Due to a large number of results and/or encounters for the requested time period, some results have not been displayed. A complete set of results can be found in Results Review.        Assessment/Plan      Senait Narvaez is a  54 y.o.  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      Patient with CHARLES stage 3 oliguric on CKD, with several previous episodes of CHARLES secondary to SCD pain crisis. Current CHARLES most likely secondary to shock/vasoocclusive crisis/multiorgan failure. Azotemia improving today and patient had better urine output than yesterday, and electrolytes are stable. No indication for dialysis at this time, and patient seems to be responding to hemodynamic support provided.      #CHARLES stage 3 oliguric on CKD II (baseline eGFR 72 - Baseline creatinine 0.8-1)  #Severe hyperkalemia resolved  #Metabolic acidosis resolved   - Multifactorial, likely due to shock/venoocclusive crisis/multiorgan failure  - FeNa 1.2% (9/10)  - Creatinine during admission: 3.32 (9/10)--> 4.07 (9/11) -->5.34 (9/12)  - s/p IV contrast on 9/10 with CT scan  - UA: Protein 1+, leukocyte esterase 500 karina/uL, WBC >50, RBC 6-10  - Urine output last 24 hours: 0.2-0.3 ml/kg/hr (9/11)  -  Hyperkalemia: K: 5.6 (9/12)  - Hypocalcemia: 7.7 (9/12)  - Metabolic acidosis: stable, pH: 7.38, HCO3: 23.1 (9/12)  - Anemia: Hb 10.4 (9/12)     #Sickle cell crisis  - 4 pain crisis on current year  ##Hgb SC disease (previously on exchange transfusions q4-6 weeks, last transfusion 3/1/24)  ##Multiorgan failure   - Requiring 2 pressors   ##Hemolytic anemia  #Acute liver injury 2/2 hemodynamic instability versus sickle cell crisis   #Acute respiratory failure on Mechanical ventilation   #CAN (night oxygen use at home)  #pHTN (6/2023)  #Hx recurrent DVT/PE (on lifelong Coumadin)  #Hx of SVC syndrome     Kidney   - CHARLES on CKD, oliguric, Stage II  - Baseline creatinine: 0.8-1  - Etiology: Multifactorial. Shock, hypotension, vasooclussive crisis   - UA showed: RBC, Protein +1   - Urine electrolyes showed FeNA/FeUrea of  consistent  ATN (1-4) - Clinical volume status: Volume status managed by primary team  - Electrolytes (Na, K, Ca, Phos): Hyperkalemia, corrected stable  - Acid base status: Metabolic acidosis, corrected stable  Hemodynamics  - Blood pressures remained stable/rapidly fluctuating/low need any pressor support during hospital course.   - On HTN meds? Any changes needed?  Avoid nephrotoxins  - Contrast, hyperglycemia, NSAIDs, ischemia (anemia)  Medications (causative)              - Example - Zosyn and vancomycin, NSAIDs, ACE/ARBs, etc.   Medications (adjustments)              - Medication adjustments based off current GFR  Obstruction              -  Renal US: pending      Recommendations:  - Continue supportive care, goal blood pressure MAP>70  - Volume status managed by primary team, diuresis as needed if hypervolemia  - Avoid nephrotoxins, contrast if possible  - strict Is/Os  - Renal dosing for medications for latest eGFR, follow medication trough as appropriate (Vancomycin/Zosyn)  - Avoid hypotension/rapid fluctuations in Bps  - No indication for RRT at present; but consent obtained in event it becomes  necessary over coming days  - Team will follow      Patient to be staffed with attending physician Dr. Dias.      Jeff Valdivia MD  PGY1 Internal Medicine

## 2024-09-13 NOTE — PROGRESS NOTES
Occupational Therapy                 Therapy Communication Note    Patient Name: Senait Narvaez  MRN: [id number]  Department: [facility] MICU  Room: 17/17-A  Today's Date: 9/13/2024     Discipline: Occupational Therapy    Missed Visit Reason: Missed Visit Reason: Patient placed on medical hold (Pt on hold, requiring increased pressors and is highly agitated with decreased sedation. Will reattempt when medically appropriate.)    Missed Time: Attempt    Vanesa Winter OT  9/13/2024

## 2024-09-13 NOTE — CONSULTS
Vancomycin Dosing by Pharmacy- Cessation of Therapy    Consult to pharmacy for vancomycin dosing has been discontinued by the prescriber, pharmacy will sign off at this time.    Please call pharmacy if there are further questions or re-enter a consult if vancomycin is resumed.     Dora Patton, PharmD

## 2024-09-13 NOTE — PROGRESS NOTES
"Medical Intensive Care - Daily Progress Note   Subjective    Senait Narvaez is a 54 y.o. female patient admitted on 9/10/2024 with following ICU needs: hypotension requiring vasopressors and acidosis with inappropriate respiratory compensation requiring intubation    Interval History:  No BM, but passing gas. No acute events.    Meds    Scheduled medications  folic acid, 1 mg, oral, Daily  pantoprazole, 40 mg, intravenous, Daily  piperacillin-tazobactam, 2.25 g, intravenous, q6h  polyethylene glycol, 17 g, oral, BID  sennosides, 2 tablet, oral, BID  [START ON 9/14/2024] thiamine, 100 mg, intravenous, Daily  thiamine, 500 mg, intravenous, TID      Continuous medications  fentaNYL, 0-300 mcg/hr, Last Rate: 50 mcg/hr (09/13/24 1300)  norepinephrine, 0.01-1 mcg/kg/min, Last Rate: 0.16 mcg/kg/min (09/13/24 1328)  propofol, 5-50 mcg/kg/min, Last Rate: Stopped (09/12/24 1005)  vasopressin, 0.03 Units/min, Last Rate: 0.03 Units/min (09/13/24 1300)      PRN medications  PRN medications: albuterol, alteplase, dextrose, dextrose, fentaNYL, glucagon, glucagon, oxyCODONE, oxygen     Objective    Blood pressure 130/55, pulse 89, temperature 36.4 °C (97.5 °F), resp. rate 17, height 1.651 m (5' 5\"), weight 123 kg (271 lb 9.7 oz), SpO2 95%.     Temp  Min: 35.8 °C (96.4 °F)  Max: 37.1 °C (98.8 °F)  Pulse  Min: 79  Max: 106  BP  Min: 130/55  Max: 130/55  Resp  Min: 12  Max: 24  SpO2  Min: 91 %  Max: 99 %    Physical Exam  Constitutional:       Appearance: She is ill-appearing.      Interventions: She is intubated.      Comments: obese, RASS -2   Cardiovascular:      Rate and Rhythm: Normal rate and regular rhythm.      Pulses: Normal pulses.      Heart sounds:      Gallop (S3) present.   Pulmonary:      Effort: She is intubated.      Comments: Rhonchorous breath sounds bilaterally, left worse than right  Abdominal:      General: Abdomen is flat.      Palpations: Abdomen is soft.   Skin:     General: Skin is warm and dry.   Neurological: "      Mental Status: She is oriented to person, place, and time.            Intake/Output Summary (Last 24 hours) at 9/13/2024 1454  Last data filed at 9/13/2024 1300  Gross per 24 hour   Intake 2526.21 ml   Output 1560 ml   Net 966.21 ml     Net IO Since Admission: 7,394.46 mL [09/13/24 1454]     Labs:   Results from last 72 hours   Lab Units 09/13/24  1120 09/13/24 0413 09/12/24 2301 09/12/24  1257 09/12/24 0441 09/11/24 2353 09/11/24  1754   HEMOGLOBIN g/dL 9.0* 9.4* 9.3*   < > 10.4* 10.3* 10.8*   HEMATOCRIT % 26.1* 26.4* 25.6*   < > 28.3* 28.0* 29.5*   WBC AUTO x10*3/uL 23.7* 24.5* 24.4*   < > 21.6* 20.2* 16.8*   PLATELETS AUTO x10*3/uL 66* 77* 74*   < > 79* 85* 83*   NEUTROS PCT AUTO %  --   --   --   --   --   --  89.8   LYMPHO PCT MAN %  --   --   --   --  0.0 6.4  --    LYMPHS PCT AUTO %  --   --   --   --   --   --  1.8   MONO PCT MAN %  --   --   --   --  6.1 0.0  --    MONOS PCT AUTO %  --   --   --   --   --   --  6.7   EOSINO PCT MAN %  --   --   --   --  0.0 0.0  --    EOS PCT AUTO %  --   --   --   --   --   --  0.2    < > = values in this interval not displayed.      Results from last 72 hours   Lab Units 09/13/24  1120 09/13/24 0413 09/12/24 2301 09/12/24  1809 09/12/24  0441 09/11/24  2353 09/11/24  1754   SODIUM mmol/L 129* 130* 129* 129*   < > 130* 131*   POTASSIUM mmol/L 3.9 4.5 4.7 4.8   < > 5.7* 5.6*   CHLORIDE mmol/L 94* 93* 94* 93*   < > 93* 96*   CO2 mmol/L 23 23 22 24   < > 25 21   BUN mg/dL 64* 67* 67* 68*   < > 66* 67*   CREATININE mg/dL 5.08* 5.27* 5.24* 5.42*   < > 4.82* 4.40*   GLUCOSE mg/dL 135* 92 102* 107*   < > 104* 105*   CALCIUM mg/dL 7.0* 7.0* 6.9* 7.3*   < > 7.7* 8.0*   ANION GAP mmol/L 16 19 18 17   < > 18 20   EGFR mL/min/1.73m*2 9* 9* 9* 9*   < > 10* 11*   PHOSPHORUS mg/dL  --   --   --  5.1*  --  3.8 3.6    < > = values in this interval not displayed.        Results from last 72 hours   Lab Units 09/13/24  0413 09/12/24  0441 09/11/24  1754 09/11/24  0504  09/11/24  0035 09/10/24  2123 09/10/24  1912   LD U/L 1,611* 4,233* 10,552*   < >  --   --  >12,000*   TROPHSCMC ng/L  --   --   --   --  1,186* 1,431* 1,315*    < > = values in this interval not displayed.        Micro/ID:   Lab Results   Component Value Date    BLOODCULT Loaded on Instrument - Culture in progress 09/12/2024    BLOODCULT Loaded on Instrument - Culture in progress 09/12/2024       Summary of key imaging results from the last 24 hours  CXR 9/13: Increase in perihilar edema/effusions and correlate with fluid and cardiac status.    Assessment and Plan     Assessment: Senait Narvaez is a 54 y.o. 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 Patient critically ill with hypotension initially requiring 2 pressors, CHARLES, acute liver injury possibly 2/2 congestive hepatopathy, hemolytic anemia requiring transfusions, altered mentation with lethargy and not compensating appropriately respiratory wise for acidosis requiring intubation.    Mechanical Ventilation: < 4 days  Sedation/Analgesia:  Fentanyl and Propofol  Restraints: Restraints indicated as alternative therapies have been attempted and have been ineffective.  Restrain with soft wrist restraints and side rails up x4 until medical devices discontinued and/or patient able to participate with plan of care.     Summary for 09/13/24:  S/p exchange transfusion 6 units PRBCs 9/11 AM, transfusion team on board, no plans for additional exchange transfusion  Continue tube feeds  Spontaneous awakening trial today, will restart home oxycodone 10mg q4h PRN for severe pain  Remains on vasopressors, titrate to MAP  >65. Ordered 500cc bolus NS.  Ordered enema given severe constipation  INR decreasing to 1.5, will speak with hematology regarding need for anticoagulation  Discontinued vancomycin  If worsening pressor requirements, will consider adding stress dose steroids.    Plan:  NEUROLOGY/PSYCH:  #AMS  ::Likely in setting of critical illness with contribution of hypoglycemia  ::Intubated, sedated  ::CTH negative  -c/w fentanyl  -Spontaneous awakening trial today  -Restart home oxycodone 10mg q4h PRN for severe pain  -thiamine 500mg TID, c/w thiamine 100mg daily     #Cauda equina with saddle numbness  -no acute interventions    CARDIOVASCULAR:  #Hypotension, septic vs hypovolemic vs cardiogenic shock  ::Mixed So2 68 demonstrates not entirely septic, may also be hypovolemic vs less likely cardiogenic shock given TTE findings  -continue levo and vaso, titrate to MAP 65+  -Ordered 500cc bolus NS  -S/p 2u pRBCs 9/10  -S/p exchange transfusion 9/11 AM  -If worsening pressor requirements, will consider adding stress dose steroids     #Elevated troponins, now downtrending  #EKG with ST depressions, likely Type II MI due to vaso-occlusive crisis and anemia  ::TTE 9/11: EF 60-65%, RV enlarged and reduced in function which appears stable when compared to last TTE (1/2024)  ::Troponin leak is likely due to cardiac demand vs supply mismatch in the setting of worsening anemia and vaso-occlusive crisis  -serial EKGs  -cardiology consulted, now signed off without indication to treat as ACS  -Will consider V/Q scan if respiratory status worsens with a strong suspicion for PE    #Hx SVT  -hold home metoprolol tartrate 12.5mg BID    PULMONARY:  #Intubated  #CAN  #pHTN (6/2023)  #Chronic 2L O2 at night use  ::On 2L O2 on arrival, denying dyspnea and cough  ::CT chest with signs of pulmonary edema  -wean vent settings as tolerated  -albuterol nebulizer for wheezing  -hold home Lasix 20    RENAL/GENITOURINARY:  #CHARLES on CKD II, now polyuric  phase  #Severe hyperkalemia with previous peaked T waves on EKG, resolving  #Hyponatremia  ::Pre-renal CHARLES likely iso hypotension  ::Baseline cre 0.8-1, now 5.27  ::Now s/p IV contrast on 9/10 with CT scan  ::9/10 OSH UA: turbid, 2+ blood, 1+ protein, 250LE, negative nitrite, rare bacteria, few squamous  ::FeNa 1.2%, consistent with ATN  -monitor BMP BID  -hyperkalemia cocktail as needed, consider adding Lokalema if potassium persistently elevated  -Nephrology consulted, no need for RRT at this time     #Mixed HAGMA/NAGMA with secondary respiratory alkalosis  #Elevated lactate (likely Type A)  -trend ABG and lactate     #Hypocalcemia  -Replete PRN    GASTROENTEROLOGY:  #Acute liver injury 2/2 hemodynamic instability versus sickle cell crisis  ::On presentation to  MICU ALT 2611, AST 4727, T bili 10.2, INR 3.0, now improving  ::CT with hepatic venous congestion, patient lacks gallbladder  ::Liver US with Doppler: Diffuse fatty infiltration, unremarkable doppler interrogation of the liver.  ::EBV IgG positive, IgM negative  ::CMV IgG positive, PCR negative  ::Acute hepatitis panel unremarkable  ::CK elevated (944)  -trend MELD labs  -Hepatology consulted, now signed off    #Constipation  -c/w sennosides  -c/w miralax  -Ordered enema given severe constipation  -Holding tube feeds pending potential extubation    ENDOCRINOLOGY:  #Hypoglycemia  ::Likely iso critical illness  -q4hr glucose checks with hypoglycemia protocol  -start D10 gtt as needed    HEMATOLOGY:  #Sickle cell crisis  #Hgb SC disease (previously on exchange transfusions q4-6 weeks, last transfusion before admission 3/1/24)  #Hemolytic anemia  ::LDH>12,000 (now downtrending), haptoglobin <30, fibrinogen 318, retic % 5.2 on presentation  ::Peripheral smear without helmet cells  -transfusion medicine consulted, appreciate recs  -Exchange transfusion completed 9/11, no plans for additional transfusion at this time  -daily LDH, retics  -Pain management with  fentanyl 25mcg PRN     #Hx recurrent DVT/PE (on lifelong Coumadin)  # hx of SVC syndrome  ::Per family, facial/neck appearance on presentation at her baseline  ::S/p FFP 9/11  - Hx SVC stricture s/p SVC angioplasty  - B/l Upper Extremity and Facial Swelling d/t partial filling defect within the SVC and a thrombus of the SVC complicated by bilateral Mediport catheters. On lifelong anticoagulation, DOAC discouraged due to high risk of clotting.  -hold home warfarin 5mg daily, trend INR. Goal 2-3.  -INR decreasing to 1.5, will speak with hematology regarding need for anticoagulation.    MUSCULOSKELETAL/ SKIN:  #Fibromyalgia  #Raynaud's disease  #Muscle spasms  -no active interventions at this time  -holding home flexuril 10mg TID prn muscle spasms due to AMS    INFECTIOUS DISEASE:  #Leukocytosis  #Concern for some component of sepsis  ::Unclear infectious source. No acute findings on CT C/A/P. Patient had denied urinary symptoms prior to intubation.  ::Procal elevated 7.09  ::S/p 2L fluids  :: Blood cultures 9/10 NGTD x 1 day  :: Blood cultures 9/12: awaiting results  -discontinued vancomycin, continue zosyn (day 4 of 7)    MISC:  -holding home vitamin C, elderberry, loratidine 10mg, zofran 4mg, oxycodone 10mg q4h prn, MVI    ICU Check List       ICU Liberation: Intervention:   Assess, Prevent, Manage Pain CPOT - Fentanyl bolus PRN   Both SAT and SBT [x] SAT  [x] SBT 30-60 min [] Extubate to NC  [] Extubate to NIV  [] Discuss Trach   Choice of analgesia and sedation RASS: 0/-1  Fentanyl    Delirium: Assess, prevent and manage CAM -    Early Mobility and Exercise  [x] PT /OT consult   Family Engagement and Empowerment [x]Family updated []SW consult     FEN   Fluids: PRN  Electrolytes: PRN  Nutrition: Enteral feeding with NPO Nepro; 10 (10mls q6hrs until goal rate of 30mls/hr); 200 (cc); Water; Other (specify); Every 8 hours     Prophylaxis:  DVT ppx: SCDs, holding home warfarin  GI ppx: PPI  Bowel care: MiraLAX BID,  senna BID    Hardware:  Catheter: Right internal jugular Trialysis (placed 9/10/24), right femoral CVC (placed 9/11/24)  Access: pIV, OG  Drains: Slater  Lines: L radial Deonna (placed 9/10/24)    Social:  Code: Full Code    HPOA: Tati Salgado (mother) [phone]. Daughter Tiesha ([phone]) also involved with patient's care.  Disposition: MICU pending extubation    Salvador Rosario MD   09/13/24 at 2:54 PM     Disclaimer: Documentation completed with the information available at the time of input. The times in the chart may not be reflective of actual patient care times, interventions, or procedures. Documentation occurs after the physical care of the patient.

## 2024-09-13 NOTE — CONSULTS
Vancomycin Dosing by Pharmacy- FOLLOW UP    Senait Narvaez is a 54 y.o. year old female who Pharmacy has been consulted for vancomycin dosing for other bacteremia . Based on the patient's indication and renal status this patient is being dosed based on a goal trough/random level of 15-20.     Renal function is currently declining. CHARLES on CKD.    Current vancomycin dose: Dose by level.     Most recent random level: 18.6 mcg/mL    Visit Vitals  /55   Pulse 85   Temp 37.1 °C (98.8 °F)   Resp 15        Lab Results   Component Value Date    CREATININE 5.27 (H) 2024    CREATININE 5.24 (H) 2024    CREATININE 5.42 (H) 2024    CREATININE 5.32 (H) 2024        Patient weight is as follows:   Vitals:    24 0600   Weight: 123 kg (271 lb 9.7 oz)       Cultures:  Susceptibility data for the encounter in last 14 days.  Collected Specimen Info Organism   24 Fluid from SPUTUM Normal throat jake        I/O last 3 completed shifts:  In: 4389.9 (35.6 mL/kg) [I.V.:2285.9 (18.6 mL/kg); Blood:289; NG/GT:1300; IV Piggyback:515]  Out: 2680 (21.8 mL/kg) [Urine:2680 (0.6 mL/kg/hr)]  Weight: 123.2 kg   I/O during current shift:  No intake/output data recorded.    Temp (24hrs), Av.6 °C (97.8 °F), Min:35.8 °C (96.4 °F), Max:37.1 °C (98.8 °F)      Assessment/Plan    Within goal random/trough level. Will redose with vancomycin 500 mg x1.   The next level will be obtained on 24 at 1000. May be obtained sooner if clinically indicated.   Will continue to monitor renal function daily while on vancomycin and order serum creatinine at least every 48 hours if not already ordered.  Follow for continued vancomycin needs, clinical response, and signs/symptoms of toxicity.       Dora Patton, PharmD

## 2024-09-14 ENCOUNTER — APPOINTMENT (OUTPATIENT)
Dept: RADIOLOGY | Facility: HOSPITAL | Age: 54
End: 2024-09-14
Payer: COMMERCIAL

## 2024-09-14 ENCOUNTER — APPOINTMENT (OUTPATIENT)
Dept: CARDIOLOGY | Facility: HOSPITAL | Age: 54
DRG: 811 | End: 2024-09-14
Payer: COMMERCIAL

## 2024-09-14 LAB
ALBUMIN SERPL BCP-MCNC: 2.2 G/DL (ref 3.4–5)
ALBUMIN SERPL BCP-MCNC: 2.5 G/DL (ref 3.4–5)
ALP SERPL-CCNC: 234 U/L (ref 33–110)
ALP SERPL-CCNC: 255 U/L (ref 33–110)
ALT SERPL W P-5'-P-CCNC: 756 U/L (ref 7–45)
ALT SERPL W P-5'-P-CCNC: 905 U/L (ref 7–45)
ANION GAP SERPL CALC-SCNC: 16 MMOL/L (ref 10–20)
ANION GAP SERPL CALC-SCNC: 16 MMOL/L (ref 10–20)
APTT PPP: 76 SECONDS (ref 27–38)
AST SERPL W P-5'-P-CCNC: 184 U/L (ref 9–39)
AST SERPL W P-5'-P-CCNC: 305 U/L (ref 9–39)
BACTERIA BLD CULT: NORMAL
BACTERIA BLD CULT: NORMAL
BILIRUB DIRECT SERPL-MCNC: 8 MG/DL (ref 0–0.3)
BILIRUB DIRECT SERPL-MCNC: 9 MG/DL (ref 0–0.3)
BILIRUB SERPL-MCNC: 13.3 MG/DL (ref 0–1.2)
BILIRUB SERPL-MCNC: 14.7 MG/DL (ref 0–1.2)
BUN SERPL-MCNC: 63 MG/DL (ref 6–23)
BUN SERPL-MCNC: 65 MG/DL (ref 6–23)
CA-I BLD-SCNC: 0.97 MMOL/L (ref 1.1–1.33)
CALCIUM SERPL-MCNC: 6.9 MG/DL (ref 8.6–10.6)
CALCIUM SERPL-MCNC: 7.8 MG/DL (ref 8.6–10.6)
CHLORIDE SERPL-SCNC: 91 MMOL/L (ref 98–107)
CHLORIDE SERPL-SCNC: 92 MMOL/L (ref 98–107)
CO2 SERPL-SCNC: 23 MMOL/L (ref 21–32)
CO2 SERPL-SCNC: 23 MMOL/L (ref 21–32)
CREAT SERPL-MCNC: 5.05 MG/DL (ref 0.5–1.05)
CREAT SERPL-MCNC: 5.09 MG/DL (ref 0.5–1.05)
EGFRCR SERPLBLD CKD-EPI 2021: 10 ML/MIN/1.73M*2
EGFRCR SERPLBLD CKD-EPI 2021: 9 ML/MIN/1.73M*2
ERYTHROCYTE [DISTWIDTH] IN BLOOD BY AUTOMATED COUNT: 22.1 % (ref 11.5–14.5)
ERYTHROCYTE [DISTWIDTH] IN BLOOD BY AUTOMATED COUNT: 22.3 % (ref 11.5–14.5)
FIBRINOGEN PPP-MCNC: 550 MG/DL (ref 200–400)
GLUCOSE BLD MANUAL STRIP-MCNC: 104 MG/DL (ref 74–99)
GLUCOSE BLD MANUAL STRIP-MCNC: 111 MG/DL (ref 74–99)
GLUCOSE BLD MANUAL STRIP-MCNC: 84 MG/DL (ref 74–99)
GLUCOSE BLD MANUAL STRIP-MCNC: 85 MG/DL (ref 74–99)
GLUCOSE BLD MANUAL STRIP-MCNC: 87 MG/DL (ref 74–99)
GLUCOSE BLD MANUAL STRIP-MCNC: 93 MG/DL (ref 74–99)
GLUCOSE SERPL-MCNC: 81 MG/DL (ref 74–99)
GLUCOSE SERPL-MCNC: 84 MG/DL (ref 74–99)
HAPTOGLOB SERPL NEPH-MCNC: <30 MG/DL (ref 30–200)
HCT VFR BLD AUTO: 24.5 % (ref 36–46)
HCT VFR BLD AUTO: 25.7 % (ref 36–46)
HGB BLD-MCNC: 8.9 G/DL (ref 12–16)
HGB BLD-MCNC: 9.3 G/DL (ref 12–16)
HGB RETIC QN: 31 PG (ref 28–38)
IMMATURE RETIC FRACTION: 35 %
INR PPP: 1.3 (ref 0.9–1.1)
LACTATE SERPL-SCNC: 0.9 MMOL/L (ref 0.4–2)
LDH SERPL L TO P-CCNC: 1072 U/L (ref 84–246)
MAGNESIUM SERPL-MCNC: 2.47 MG/DL (ref 1.6–2.4)
MCH RBC QN AUTO: 29.8 PG (ref 26–34)
MCH RBC QN AUTO: 30 PG (ref 26–34)
MCHC RBC AUTO-ENTMCNC: 36.2 G/DL (ref 32–36)
MCHC RBC AUTO-ENTMCNC: 36.3 G/DL (ref 32–36)
MCV RBC AUTO: 82 FL (ref 80–100)
MCV RBC AUTO: 83 FL (ref 80–100)
NRBC BLD-RTO: 197.9 /100 WBCS (ref 0–0)
NRBC BLD-RTO: 235.3 /100 WBCS (ref 0–0)
PLATELET # BLD AUTO: 64 X10*3/UL (ref 150–450)
PLATELET # BLD AUTO: 64 X10*3/UL (ref 150–450)
POTASSIUM SERPL-SCNC: 4.1 MMOL/L (ref 3.5–5.3)
POTASSIUM SERPL-SCNC: 4.2 MMOL/L (ref 3.5–5.3)
PROT SERPL-MCNC: 4.5 G/DL (ref 6.4–8.2)
PROT SERPL-MCNC: 5.2 G/DL (ref 6.4–8.2)
PROTHROMBIN TIME: 14.4 SECONDS (ref 9.8–12.8)
RBC # BLD AUTO: 2.99 X10*6/UL (ref 4–5.2)
RBC # BLD AUTO: 3.1 X10*6/UL (ref 4–5.2)
RETICS #: 0.48 X10*6/UL (ref 0.02–0.08)
RETICS/RBC NFR AUTO: 15.4 % (ref 0.5–2)
SODIUM SERPL-SCNC: 126 MMOL/L (ref 136–145)
SODIUM SERPL-SCNC: 127 MMOL/L (ref 136–145)
UFH PPP CHRO-ACNC: 0.2 IU/ML
UFH PPP CHRO-ACNC: 0.3 IU/ML
UFH PPP CHRO-ACNC: 0.3 IU/ML
WBC # BLD AUTO: 20.2 X10*3/UL (ref 4.4–11.3)
WBC # BLD AUTO: 22.4 X10*3/UL (ref 4.4–11.3)

## 2024-09-14 PROCEDURE — 2020000001 HC ICU ROOM DAILY

## 2024-09-14 PROCEDURE — 2500000001 HC RX 250 WO HCPCS SELF ADMINISTERED DRUGS (ALT 637 FOR MEDICARE OP)

## 2024-09-14 PROCEDURE — 2500000004 HC RX 250 GENERAL PHARMACY W/ HCPCS (ALT 636 FOR OP/ED)

## 2024-09-14 PROCEDURE — 37799 UNLISTED PX VASCULAR SURGERY: CPT

## 2024-09-14 PROCEDURE — 80048 BASIC METABOLIC PNL TOTAL CA: CPT | Mod: CCI

## 2024-09-14 PROCEDURE — 93005 ELECTROCARDIOGRAM TRACING: CPT

## 2024-09-14 PROCEDURE — 74018 RADEX ABDOMEN 1 VIEW: CPT | Performed by: STUDENT IN AN ORGANIZED HEALTH CARE EDUCATION/TRAINING PROGRAM

## 2024-09-14 PROCEDURE — 83605 ASSAY OF LACTIC ACID: CPT

## 2024-09-14 PROCEDURE — 71045 X-RAY EXAM CHEST 1 VIEW: CPT

## 2024-09-14 PROCEDURE — 85520 HEPARIN ASSAY: CPT

## 2024-09-14 PROCEDURE — 80053 COMPREHEN METABOLIC PANEL: CPT

## 2024-09-14 PROCEDURE — 82947 ASSAY GLUCOSE BLOOD QUANT: CPT

## 2024-09-14 PROCEDURE — 84075 ASSAY ALKALINE PHOSPHATASE: CPT

## 2024-09-14 PROCEDURE — 85027 COMPLETE CBC AUTOMATED: CPT

## 2024-09-14 PROCEDURE — 85610 PROTHROMBIN TIME: CPT

## 2024-09-14 PROCEDURE — 99232 SBSQ HOSP IP/OBS MODERATE 35: CPT | Performed by: STUDENT IN AN ORGANIZED HEALTH CARE EDUCATION/TRAINING PROGRAM

## 2024-09-14 PROCEDURE — 71045 X-RAY EXAM CHEST 1 VIEW: CPT | Performed by: STUDENT IN AN ORGANIZED HEALTH CARE EDUCATION/TRAINING PROGRAM

## 2024-09-14 PROCEDURE — 85045 AUTOMATED RETICULOCYTE COUNT: CPT

## 2024-09-14 PROCEDURE — 2500000004 HC RX 250 GENERAL PHARMACY W/ HCPCS (ALT 636 FOR OP/ED): Mod: JZ

## 2024-09-14 PROCEDURE — 83010 ASSAY OF HAPTOGLOBIN QUANT: CPT

## 2024-09-14 PROCEDURE — 85384 FIBRINOGEN ACTIVITY: CPT

## 2024-09-14 PROCEDURE — 94003 VENT MGMT INPAT SUBQ DAY: CPT

## 2024-09-14 PROCEDURE — 2500000005 HC RX 250 GENERAL PHARMACY W/O HCPCS

## 2024-09-14 PROCEDURE — 80048 BASIC METABOLIC PNL TOTAL CA: CPT

## 2024-09-14 PROCEDURE — 99291 CRITICAL CARE FIRST HOUR: CPT

## 2024-09-14 PROCEDURE — 83615 LACTATE (LD) (LDH) ENZYME: CPT

## 2024-09-14 PROCEDURE — 83735 ASSAY OF MAGNESIUM: CPT

## 2024-09-14 PROCEDURE — 74018 RADEX ABDOMEN 1 VIEW: CPT

## 2024-09-14 PROCEDURE — 99233 SBSQ HOSP IP/OBS HIGH 50: CPT

## 2024-09-14 PROCEDURE — 82330 ASSAY OF CALCIUM: CPT

## 2024-09-14 RX ORDER — CALCIUM GLUCONATE 20 MG/ML
2 INJECTION, SOLUTION INTRAVENOUS ONCE
Status: COMPLETED | OUTPATIENT
Start: 2024-09-14 | End: 2024-09-14

## 2024-09-14 RX ORDER — BISACODYL 10 MG/1
10 SUPPOSITORY RECTAL ONCE AS NEEDED
Status: COMPLETED | OUTPATIENT
Start: 2024-09-14 | End: 2024-09-15

## 2024-09-14 RX ORDER — DEXMEDETOMIDINE HYDROCHLORIDE 4 UG/ML
.1-1.5 INJECTION, SOLUTION INTRAVENOUS CONTINUOUS
Status: DISCONTINUED | OUTPATIENT
Start: 2024-09-14 | End: 2024-09-16

## 2024-09-14 ASSESSMENT — PAIN - FUNCTIONAL ASSESSMENT
PAIN_FUNCTIONAL_ASSESSMENT: CPOT (CRITICAL CARE PAIN OBSERVATION TOOL)
PAIN_FUNCTIONAL_ASSESSMENT: CPOT (CRITICAL CARE PAIN OBSERVATION TOOL)

## 2024-09-14 ASSESSMENT — PAIN SCALES - GENERAL: PAINLEVEL_OUTOF10: 0 - NO PAIN

## 2024-09-14 ASSESSMENT — PAIN SCALES - PAIN ASSESSMENT IN ADVANCED DEMENTIA (PAINAD)
BODYLANGUAGE: TENSE, DISTRESSED PACING, FIDGETING
TOTALSCORE: 3
CONSOLABILITY: NO NEED TO CONSOLE
BREATHING: NORMAL
NEGVOCALIZATION: OCCASIONAL MOAN/GROAN, LOW SPEECH, NEGATIVE/DISAPPROVING QUALITY
FACIALEXPRESSION: SAD, FRIGHTENED, FROWN

## 2024-09-14 NOTE — PROGRESS NOTES
"Medical Intensive Care - Daily Progress Note   Subjective    Senait Narvaez is a 54 y.o. female patient admitted on 9/10/2024 with following ICU needs: hypotension requiring vasopressors and acidosis with inappropriate respiratory compensation requiring intubation     Interval History:  Overnight, patient had bowel, movement. One large solid stool and rest liquid. No other events.    Meds    Scheduled medications  folic acid, 1 mg, oral, Daily  oxyCODONE, 10 mg, oral, q6h  pantoprazole, 40 mg, intravenous, Daily  piperacillin-tazobactam, 2.25 g, intravenous, q6h  polyethylene glycol, 17 g, oral, BID  sennosides, 2 tablet, oral, BID  thiamine, 100 mg, intravenous, Daily      Continuous medications  dexmedeTOMIDine, 0.1-1.5 mcg/kg/hr, Last Rate: 0.4 mcg/kg/hr (09/14/24 1100)  fentaNYL, 0-300 mcg/hr, Last Rate: 25 mcg/hr (09/14/24 0700)  heparin, 0-4,000 Units/hr, Last Rate: 1,400 Units/hr (09/14/24 1046)  norepinephrine, 0.01-1 mcg/kg/min, Last Rate: 0.15 mcg/kg/min (09/14/24 1045)  propofol, 5-50 mcg/kg/min, Last Rate: Stopped (09/12/24 1005)  vasopressin, 0.03 Units/min, Last Rate: 0.03 Units/min (09/14/24 0931)      PRN medications  PRN medications: albuterol, alteplase, bisacodyl, dextrose, dextrose, fentaNYL, glucagon, glucagon, oxygen     Objective    Blood pressure 130/55, pulse 62, temperature 36.6 °C (97.9 °F), temperature source Temporal, resp. rate 14, height 1.651 m (5' 5\"), weight 122 kg (268 lb 15.4 oz), SpO2 96%.     Temp  Min: 36.6 °C (97.9 °F)  Max: 37.6 °C (99.7 °F)  Pulse  Min: 62  Max: 91  Resp  Min: 12  Max: 19  SpO2  Min: 91 %  Max: 99 %    Physical Exam  Constitutional:       Appearance: She is ill-appearing.      Interventions: She is intubated.      Comments: obese, RASS -2   Cardiovascular:      Rate and Rhythm: Normal rate and regular rhythm.      Pulses: Normal pulses.      Heart sounds:      Gallop (S3) present.   Pulmonary:      Effort: She is intubated.      Comments: Rhonchorous breath " sounds bilaterally, left worse than right  Abdominal:      General: Abdomen is flat.      Palpations: Abdomen is soft.   Skin:     General: Skin is warm and dry. Dark pigmentation rash over right breast and nipple.   Neurological:      Mental Status: She is oriented to person, place, and time.     Intake/Output Summary (Last 24 hours) at 9/14/2024 1317  Last data filed at 9/14/2024 1100  Gross per 24 hour   Intake 2277.11 ml   Output 3670 ml   Net -1392.89 ml     Net IO Since Admission: 5,776.57 mL [09/14/24 1317]     Labs:   Results from last 72 hours   Lab Units 09/14/24  0450 09/13/24  1825 09/13/24  1120 09/12/24  1257 09/12/24 0441 09/11/24 2353 09/11/24  1754   HEMOGLOBIN g/dL 9.3* 9.2* 9.0*   < > 10.4* 10.3* 10.8*   HEMATOCRIT % 25.7* 27.0* 26.1*   < > 28.3* 28.0* 29.5*   WBC AUTO x10*3/uL 22.4* 24.8* 23.7*   < > 21.6* 20.2* 16.8*   PLATELETS AUTO x10*3/uL 64* 57* 66*   < > 79* 85* 83*   NEUTROS PCT AUTO %  --   --   --   --   --   --  89.8   LYMPHO PCT MAN %  --   --   --   --  0.0 6.4  --    LYMPHS PCT AUTO %  --   --   --   --   --   --  1.8   MONO PCT MAN %  --   --   --   --  6.1 0.0  --    MONOS PCT AUTO %  --   --   --   --   --   --  6.7   EOSINO PCT MAN %  --   --   --   --  0.0 0.0  --    EOS PCT AUTO %  --   --   --   --   --   --  0.2    < > = values in this interval not displayed.      Results from last 72 hours   Lab Units 09/14/24  0450 09/13/24  1825 09/13/24  1120 09/12/24  2301 09/12/24  1809 09/12/24  0441 09/11/24  2353 09/11/24  1754   SODIUM mmol/L 127* 129* 129*   < > 129*   < > 130* 131*   POTASSIUM mmol/L 4.2 4.0 3.9   < > 4.8   < > 5.7* 5.6*   CHLORIDE mmol/L 92* 93* 94*   < > 93*   < > 93* 96*   CO2 mmol/L 23 23 23   < > 24   < > 25 21   BUN mg/dL 65* 67* 64*   < > 68*   < > 66* 67*   CREATININE mg/dL 5.09* 5.07* 5.08*   < > 5.42*   < > 4.82* 4.40*   GLUCOSE mg/dL 84 118* 135*   < > 107*   < > 104* 105*   CALCIUM mg/dL 6.9* 7.2* 7.0*   < > 7.3*   < > 7.7* 8.0*   ANION GAP mmol/L  16 17 16   < > 17   < > 18 20   EGFR mL/min/1.73m*2 9* 10* 9*   < > 9*   < > 10* 11*   PHOSPHORUS mg/dL  --   --   --   --  5.1*  --  3.8 3.6    < > = values in this interval not displayed.        Micro/ID:   Lab Results   Component Value Date    BLOODCULT No growth at 1 day 09/12/2024    BLOODCULT No growth at 1 day 09/12/2024       Summary of key imaging results from the last 24 hours  CXR 9/14:  Slightly low lung volumes with bronchovascular crowding and  questionable mild pulmonary edema. There is similar mild bibasilar  presumed atelectasis with small pleural effusions, left more than  right. There is no pneumothorax.    KUB 9/14  Multiple gas-filled prominent and mildly dilated small bowel  loops, similar to minimally worsened as compared to prior radiograph  09/10/2024. Findings likely represent ileus and attention on  follow-up imaging is recommended.  Gas seen within non dilated colon. Moderate colonic stool burden,  particularly in the rectal region    Assessment and Plan     Assessment: Senait Narvaez is a 54 y.o. 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 Patient critically ill with hypotension initially requiring 2 pressors, CHARLES, acute liver injury possibly 2/2 congestive hepatopathy, hemolytic anemia requiring transfusions, altered mentation with lethargy and not compensating appropriately respiratory wise for acidosis requiring intubation.     Mechanical Ventilation: < 4 days  Sedation/Analgesia:  Fentanyl and Propofol  Restraints: Restraints indicated as alternative therapies have been attempted and have been ineffective.  Restrain with soft  wrist restraints and side rails up x4 until medical devices discontinued and/or patient able to participate with plan of care.      Summary for 09/14/24:  S/p exchange transfusion 6 units PRBCs 9/11 AM, transfusion team on board, no plans for additional exchange transfusion  Continue tube feeds  Patient with ~2L of presumed stool output via GG tube and vomiting. Aggressive anti-constipation via enema was given. Patient now making BMs and no longer draining fecal matter from OG suction. Repleted volume loss with fluids.   Remains on vasopressors, titrate to MAP >70. Down trending pressor needs.   If worsening pressor requirements, will consider adding stress dose steroids.  Making good urine in setting of CHARLES with down trending Cr  New rash over b/l breasts: confluence of pustular lesions, non-erythematous, no purulence. Drug reaction (acneiform eruption?) vs less likely cellulitis/mastitis. Noted prior to the adding of precedex. Treated with anti-histamine cream and will ctm.     Plan:  NEUROLOGY/PSYCH:  #AMS  ::Likely in setting of critical illness with contribution of hypoglycemia  ::Intubated, sedated  ::CTH negative  -c/w fentanyl  -Restart home oxycodone 10mg q4h PRN for severe pain  -thiamine 500mg TID, c/w thiamine 100mg daily     #Cauda equina with saddle numbness  -no acute interventions     CARDIOVASCULAR:  #Hypotension, septic vs hypovolemic vs cardiogenic shock  ::Mixed So2 68 demonstrates not entirely septic, may also be hypovolemic vs less likely cardiogenic shock given TTE findings  -continue levo and vaso, titrate to MAP 65+  -Ordered 500cc bolus NS  -S/p 2u pRBCs 9/10  -S/p exchange transfusion 9/11 AM  -If worsening pressor requirements, will consider adding stress dose steroids     #Elevated troponins, now downtrending  #EKG with ST depressions, likely Type II MI due to vaso-occlusive crisis and anemia  ::TTE 9/11: EF 60-65%, RV enlarged and reduced in function which appears stable when compared  to last TTE (1/2024)  ::Troponin leak is likely due to cardiac demand vs supply mismatch in the setting of worsening anemia and vaso-occlusive crisis  -serial EKGs  -cardiology consulted, now signed off without indication to treat as ACS  -Will consider V/Q scan if respiratory status worsens with a strong suspicion for PE     #Hx SVT  -hold home metoprolol tartrate 12.5mg BID     PULMONARY:  #Intubated  #CAN  #pHTN (6/2023)  #Chronic 2L O2 at night use  ::On 2L O2 on arrival, denying dyspnea and cough  ::CT chest with signs of pulmonary edema  -wean vent settings as tolerated  -albuterol nebulizer for wheezing  -hold home Lasix 20     RENAL/GENITOURINARY:  #CHARLES on CKD II, now polyuric phase  #Severe hyperkalemia with previous peaked T waves on EKG, resolving  #Hyponatremia  ::Pre-renal CHARLES likely iso hypotension  ::Baseline cre 0.8-1, now 5.09, downtrending  ::Now s/p IV contrast on 9/10 with CT scan  ::9/10 OSH UA: turbid, 2+ blood, 1+ protein, 250LE, negative nitrite, rare bacteria, few squamous  ::FeNa 1.2%, consistent with ATN  -monitor BMP BID  -hyperkalemia cocktail as needed, consider adding Lokalema if potassium persistently elevated  -Nephrology consulted, no need for RRT at this time     #Mixed HAGMA/NAGMA with secondary respiratory alkalosis  #Elevated lactate (likely Type A)  -trend ABG and lactate     #Hypocalcemia  -Replete PRN     GASTROENTEROLOGY:  #Acute liver injury 2/2 hemodynamic instability versus sickle cell crisis  ::On presentation to  MICU ALT 2611, AST 4727, T bili 10.2, INR 3.0, now improving  ::CT with hepatic venous congestion, patient lacks gallbladder  ::Liver US with Doppler: Diffuse fatty infiltration, unremarkable doppler interrogation of the liver.  ::EBV IgG positive, IgM negative  ::CMV IgG positive, PCR negative  ::Acute hepatitis panel unremarkable  ::CK elevated (944) at admission  -trend MELD labs  -Hepatology consulted, now signed off  -recurrent severe constipation      #Constipation  -c/w sennosides  -c/w miralax  -Ordered enema given severe constipation  -Holding tube feeds pending potential extubation     ENDOCRINOLOGY:  #Hypoglycemia  ::Likely iso critical illness  -q4hr glucose checks with hypoglycemia protocol  -start D10 gtt as needed     HEMATOLOGY:  #Sickle cell crisis  #Hgb SC disease (previously on exchange transfusions q4-6 weeks, last transfusion before admission 3/1/24)  #Hemolytic anemia  ::LDH>12,000 (now downtrending), haptoglobin <30, fibrinogen 318, retic % 5.2 on presentation  ::Peripheral smear without helmet cells  -transfusion medicine consulted, appreciate recs  -Exchange transfusion completed 9/11, no plans for additional transfusion at this time  -daily LDH, retics  -Pain management with fentanyl 25mcg PRN     #Hx recurrent DVT/PE (on lifelong Coumadin)  # hx of SVC syndrome  ::Per family, facial/neck appearance on presentation at her baseline  ::S/p FFP 9/11  - Hx SVC stricture s/p SVC angioplasty  - B/l Upper Extremity and Facial Swelling d/t partial filling defect within the SVC and a thrombus of the SVC complicated by bilateral Mediport catheters. On lifelong anticoagulation, DOAC discouraged due to high risk of clotting.  -hold home warfarin 5mg daily, trend INR. Goal 2-3.  -INR decreasing to 1.4, heparin gtt increased     MUSCULOSKELETAL/ SKIN:  #Fibromyalgia  #Raynaud's disease  #Muscle spasms  -no active interventions at this time  -holding home flexuril 10mg TID prn muscle spasms due to AMS     INFECTIOUS DISEASE:  #Leukocytosis  #Concern for some component of sepsis  ::Unclear infectious source. No acute findings on CT C/A/P. Patient had denied urinary symptoms prior to intubation.  ::Procal elevated 7.09  ::S/p 2L fluids  :: Blood cultures 9/10 NGTD x 3 day  :: Blood cultures 9/12: NGTD x 1 day  -discontinued vancomycin, continue zosyn (day 5 of 7)     MISC:  -holding home vitamin C, elderberry, loratidine 10mg, zofran 4mg, oxycodone 10mg q4h  prn, MVI    ICU Check List              ICU Liberation: Intervention:   Assess, Prevent, Manage Pain CPOT - Fentanyl bolus PRN   Both SAT and SBT [x] SAT  [x] SBT 30-60 min [] Extubate to NC  [] Extubate to NIV  [] Discuss Trach   Choice of analgesia and sedation RASS: 0/-1  Fentanyl     Delirium: Assess, prevent and manage CAM -     Early Mobility and Exercise   [x] PT /OT consult   Family Engagement and Empowerment [x]Family updated []SW consult      FEN   Fluids: PRN  Electrolytes: PRN  Nutrition: Enteral feeding with NPO Nepro; 10 (10mls q6hrs until goal rate of 30mls/hr); 200 (cc); Water; Other (specify); Every 8 hours      Prophylaxis:  DVT ppx: SCDs, holding home warfarin  GI ppx: PPI  Bowel care: MiraLAX BID, senna BID     Hardware:  Catheter: Right internal jugular Trialysis (placed 9/10/24), right femoral CVC (placed 9/11/24)  Access: pIV, OG  Drains: Slater  Lines: L radial Deonna (placed 9/10/24)     Social:  Code: Full Code    HPOA: Tati Salgado (mother) [phone]. Daughter Tiesha ([phone]) also involved with patient's care.  Disposition: MICU pending extubation     Jose Arevalo MD   09/14/24 at 1:17 PM     Disclaimer: Documentation completed with the information available at the time of input. The times in the chart may not be reflective of actual patient care times, interventions, or procedures. Documentation occurs after the physical care of the patient.

## 2024-09-14 NOTE — PROGRESS NOTES
Name: Senait Narvaez  MRN: [id number]  Encounter Date: 9/14/2024  PCP: No Assigned PCP Generic Provider, MD  Heme-Onc: Dr. Hill     Reason for consult: Hx of SC disease, shock with multiorgan system failure, now s/p exchange transfusion   Attending Provider Amanda LU     Hematology/ Oncology Consult Daily Progress Note    Overnight Events   NGT placed with feculent-appearing return. Remains on levo and vaso but vent settings at 30%/5. Per team, no bleeding on heparin.     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 Patient previously dependent on red cell exchange transfusion which was on hold per patient preference. Hx of avascular necrosis of R hip      From discussion with mother on 9/12-- She was previously having exchanges q4-6 weeks, but suffered extreme pain after exchanges and often required hospitalizations after them. She would feel better around 10 days after exchange. The repeated burden and pain of exchanges was becoming too much for her, so she decided to take a break, discussed with Dr. Whaley. She has not had an exchange since March. Mother reports she has had multiple ICU admissions requiring intubation, most recent she believes in 2017, cannot recall the one before that.    From 12/06/2023 Heme Onc note-   She was diagnosed with SVC syndrome, she had Bilateral Upper Extremity and Facial Swelling d/t partial filling defect within the SVC and a thrombus of the SVC complicated by bilateral Mediport catheters. Vasc med consulted, rec lifelong coumadin. She was on heparin drip bridge and coumadin was initiated on 1/16. INR 3.0 on 1/29/2019. She is status post Venogram, SVC balloon angioplasty and Right mediport removal (1/15/20) and status post placement of left IJ temporary apharesis catheter, SVC angiogram, Balloon angioplasty of SVC/left brachiocephalic vein, Removal of left sided Mediport catheter (1/21/20). She was given IV diuretics lasix 2/26-29 for UE edema 2/2 SVC syndrome  "with edema. Breast remain swollen recommended breast binder. SVC in Nov 2022 with stent placed.      H&P from 1/8/2018 refers to multiorgan failure x2 with hepatopathy (once in Dec 2017)    Review of Systems   Unable to obtain    Allergies   No Known Allergies    Medications     folic acid, 1 mg, Daily  oxyCODONE, 10 mg, q6h  pantoprazole, 40 mg, Daily  piperacillin-tazobactam, 2.25 g, q6h  polyethylene glycol, 17 g, BID  sennosides, 2 tablet, BID  thiamine, 100 mg, Daily      dexmedeTOMIDine, Last Rate: 0.4 mcg/kg/hr (09/14/24 1100)  fentaNYL, Last Rate: 25 mcg/hr (09/14/24 0700)  heparin, Last Rate: 1,400 Units/hr (09/14/24 1046)  norepinephrine, Last Rate: 0.15 mcg/kg/min (09/14/24 1045)  propofol, Last Rate: Stopped (09/12/24 1005)  vasopressin, Last Rate: 0.03 Units/min (09/14/24 0931)      albuterol, 2.5 mg, q6h PRN  alteplase, 2 mg, PRN  bisacodyl, 10 mg, Once PRN  dextrose, 12.5 g, q15 min PRN  dextrose, 25 g, q15 min PRN  fentaNYL, 25 mcg, q10 min PRN  glucagon, 1 mg, q15 min PRN  glucagon, 1 mg, q15 min PRN  oxygen, , Continuous PRN - O2/gases        Physical Exam   Blood pressure 130/55, pulse 62, temperature 36.6 °C (97.9 °F), temperature source Temporal, resp. rate 14, height 1.651 m (5' 5\"), weight 122 kg (268 lb 15.4 oz), SpO2 96%.    Gen: intubated, awake with minimal discomfort   HEENT: AT/NC, NGT with feculent return, dried blood in nares bilaterally   CV: RRR, remains hypotensive on two pressors   Pulm: rhonchi bilaterally   Abd: soft, NT/ND, no organomegaly  Ext: no LE edema  Skin: warm and dry    Labs     Lab Results   Component Value Date    GLUCOSE 84 09/14/2024    CALCIUM 6.9 (L) 09/14/2024     (L) 09/14/2024    K 4.2 09/14/2024    CO2 23 09/14/2024    CL 92 (L) 09/14/2024    BUN 65 (H) 09/14/2024    CREATININE 5.09 (H) 09/14/2024       Lab Results   Component Value Date    WBC 22.4 (H) 09/14/2024    HGB 9.3 (L) 09/14/2024    HCT 25.7 (L) 09/14/2024    MCV 83 09/14/2024    PLT 64 (L) " 09/14/2024       Lab Results   Component Value Date     (H) 09/14/2024     (H) 09/14/2024    ALKPHOS 255 (H) 09/14/2024    BILITOT 13.3 (H) 09/14/2024      Latest Reference Range & Units Most Recent   POCT pH, Arterial 7.38 - 7.42 pH 7.44 (H)  9/11/24 16:31   POCT pCO2, Arterial 38 - 42 mm Hg 28 (L)  9/11/24 16:31   POCT pO2, Arterial 85 - 95 mm Hg 138 (H)  9/11/24 16:31   POCT SO2, Arterial 94 - 100 % 99  9/11/24 16:31   POCT Oxy Hemoglobin, Arterial 94.0 - 98.0 % 95.7  9/11/24 16:31   POCT Hematocrit Calculated, Arterial 36.0 - 46.0 % 33.0 (L)  9/11/24 16:31   POCT Sodium, Arterial 136 - 145 mmol/L 127 (L)  9/11/24 16:31   POCT Potassium, Arterial 3.5 - 5.3 mmol/L 5.7 (H)  9/11/24 16:31   POCT Chloride, Arterial 98 - 107 mmol/L 100  9/11/24 16:31   POCT Ionized Calcium, Arterial 1.10 - 1.33 mmol/L 1.04 (L)  9/11/24 16:31   POCT Glucose, Arterial 74 - 99 mg/dL 90  9/11/24 16:31   POCT Lactate, Arterial 0.4 - 2.0 mmol/L 3.9 (H)  9/11/24 16:31   POCT Base Excess, Arterial -2.0 - 3.0 mmol/L -4.1 (L)  9/11/24 16:31   POCT HCO3 Calculated, Arterial 22.0 - 26.0 mmol/L 19.0 (L)  9/11/24 16:31   POCT Hemoglobin, Arterial 12.0 - 16.0 g/dL 11.1 (L)  9/11/24 16:31   POCT Anion Gap, Arterial 10 - 25 mmo/L 14  9/11/24 16:31   (H): Data is abnormally high  (L): Data is abnormally low   Latest Reference Range & Units 09/12/24 04:41   WBC 4.4 - 11.3 x10*3/uL 21.6 (H)   nRBC 0.0 - 0.0 /100 WBCs 356.4 (H)   RBC 4.00 - 5.20 x10*6/uL 3.50 (L)   HEMOGLOBIN 12.0 - 16.0 g/dL 10.4 (L)   HEMATOCRIT 36.0 - 46.0 % 28.3 (L)   MCV 80 - 100 fL 81   MCH 26.0 - 34.0 pg 29.7   MCHC 32.0 - 36.0 g/dL 36.7 (H)   RED CELL DISTRIBUTION WIDTH 11.5 - 14.5 % 20.0 (H)   Platelets 150 - 450 x10*3/uL 79 (L)   Immature Granulocytes %, Automated 0.0 - 0.9 % 1.6 (H)   Immature Granulocytes Absolute, Automated 0.00 - 0.70 x10*3/uL 0.34   Neutrophils %, Manual 40.0 - 80.0 % 34.7   Bands %, Manual 0.0 - 5.0 % 49.0   Lymphocytes %, Manual 13.0 - 44.0  % 0.0   Monocytes %, Manual 2.0 - 10.0 % 6.1   Eosinophils %, Manual 0.0 - 6.0 % 0.0   Basophils %, Manual 0.0 - 2.0 % 0.0   Metamyelocytes % 0.0 - 0.0 % 4.1   Myelocytes %, Manual 0.0 - 0.0 % 6.1   Seg Neutrophils Absolute, Manual 1.20 - 7.00 x10*3/uL 7.50 (H)   Bands Absolute, Manual 0.00 - 0.70 x10*3/uL 10.58 (H)   Lymphocytes Absolute, Manual 1.20 - 4.80 x10*3/uL 0.00 (L)   Monocytes Absolute, Manual 0.10 - 1.00 x10*3/uL 1.32 (H)   Eosinophils Absolute, Manual 0.00 - 0.70 x10*3/uL 0.00   Basophils Absolute, Manual 0.00 - 0.10 x10*3/uL 0.00   Metamyelocytes Absolute 0.00 - 0.00 x10*3/uL 0.89   Myelocytes Absolute 0.00 - 0.00 x10*3/uL 1.32   Total Cells Counted  49   Neutrophils Absolute, Manual 1.20 - 7.70 x10*3/uL 18.08 (H)   RBC Morphology  See Below   Polychromasia  Mild   RBC Fragments  Few   Sickle Cells  Few   Target Cells  Few   Teardrop Cells  Few   Stonewall Cells  Few   Acanthocytes  Few   Basophilic Stippling  Present   (H): Data is abnormally high  (L): Data is abnormally low  Imaging   Reviewed     Assessment/Plan     Senait Narvaez is a 54 y.o. 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 Patient was transferred to [facility] MICU and subsequently had hypotension requiring vasopressors and acidosis with inadequate respiratory compensation requiring intubation.      NORA is negative, and review of blood smear did not show schistocytes or other evidence of hemolysis. Hemolysis does not fit with clinical picture and there is no evidence that patient has had hemolytic reactions previously. 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     Etiology for new  onset hypoxia is unclear in this patient, as it could be an early or atypical presentation of acute chest syndrome, or pulmonary embolism. Infection also cannot be ruled out. Patient is severely thrombocytopenic, and warfarin has not been reversed. Primary team can consider reversal.     Patient is s/p exchange transfusion (6u pRBC)and 2u pRBC, which should help prevent further sickling and its sequelae. Hgb S is well below goal of 30% of total Hgb. We recommend CBCs, reticulocyte counts, LDH, and DIC panel daily.    9/14 Update: Continue supportive care. No indication for repeat exchange at this time. Continue heparin infusion and monitor closely for bleeding.     Patient seen and discussed with attending physician, Dr. Hill, who agrees with the above.     Ky Person MD  Hematology-Oncology Fellow, PGY5  Hematology Consult Pager: [phone]  Oncology Consult Pager: [phone]

## 2024-09-14 NOTE — PROGRESS NOTES
Senait Narvaez is a 54 y.o. female on day 4 of admission presenting with No Principal Problem: There is no principal problem currently on the Problem List. Please update the Problem List and refresh..      Subjective   Patient on MV under sedation. No events overnight.        Objective     Last Recorded Vitals  /55   Pulse 79   Temp 36.9 °C (98.4 °F) (Temporal)   Resp 12   Wt 122 kg (268 lb 15.4 oz)   SpO2 96%   Intake/Output last 3 Shifts:    Intake/Output Summary (Last 24 hours) at 9/14/2024 0900  Last data filed at 9/14/2024 0800  Gross per 24 hour   Intake 2885.98 ml   Output 2750 ml   Net 135.98 ml       Admission Weight  Weight: 110 kg (242 lb 8.1 oz) (09/10/24 2000)    Daily Weight  09/14/24 : 122 kg (268 lb 15.4 oz)    Image Results  XR chest 1 view  Narrative: Interpreted By:  Osman Granados,   STUDY:  XR CHEST 1 VIEW;  9/14/2024 4:15 am      INDICATION:  Signs/Symptoms:Intubated and sedated.      COMPARISON:  Chest radiograph 09/13/2024; chest, abdomen and pelvis CT scan  09/11/2024      ACCESSION NUMBER(S):  [id number]      ORDERING CLINICIAN:  KWAKU IVERSON      FINDINGS:  AP radiograph of the chest. Patient is now slightly rotated towards  right. Endotracheal tube tip now projects approximately 4.7 cm  superior to the level of todd. Enteric tube is again seen coursing  below diaphragm and tip not included in field of view.      CARDIOMEDIASTINAL SILHOUETTE:  The cardiomediastinal silhouette is stable in size and configuration.      LUNGS:  Slightly low lung volumes with bronchovascular crowding and  questionable mild pulmonary edema. There is similar mild bibasilar  presumed atelectasis with small pleural effusions, left more than  right. There is no pneumothorax.      ABDOMEN:  No remarkable upper abdominal findings.      BONES:  No acute osseous abnormality.      Impression: 1. No significant change in cardiopulmonary status.  2. Questionable mild pulmonary edema, in setting of low lung  volumes..  3. Similar mild bibasilar presumed atelectasis with small pleural  effusions, left more than right.  4. Medical devices as above.      Signed by: Osman Granados 9/14/2024 7:30 AM  Dictation workstation:   [id number]  XR abdomen 1 view  Narrative: Interpreted By:  Osman Granados,   STUDY:  XR ABDOMEN 1 VIEW;  9/14/2024 4:15 am      INDICATION:  Signs/Symptoms:Assess for ileus.      COMPARISON:  Abdominal radiograph 09/10/2024 and chest, abdomen and pelvis CT scan  09/11/2024      ACCESSION NUMBER(S):  [id number]      ORDERING CLINICIAN:  KWAKU IVERSON      FINDINGS:  2 AP radiographs of abdomen are available for interpretation.  Enteric tube is seen coursing below diaphragm and tip projecting over  expected location of distal stomach. Right femoral approach catheter  with tip projecting over right sacroiliac joint. Status post prior  cholecystectomy.      Multiple gas-filled prominent and mildly dilated small bowel loops.  Gas is also seen within non dilated colon. Moderate colonic stool  burden, particularly in the rectal region. Small amount of positive  contrast material within left upper quadrant abdomen. Limited  evaluation of pneumoperitoneum on supine imaging, however no gross  evidence of free air is noted.      Visualized lung bases are clear      Osseous structures demonstrate no acute bony changes.      Impression: 1. Multiple gas-filled prominent and mildly dilated small bowel  loops, similar to minimally worsened as compared to prior radiograph  09/10/2024. Findings likely represent ileus and attention on  follow-up imaging is recommended.  2. Gas seen within non dilated colon. Moderate colonic stool burden,  particularly in the rectal region      Signed by: Osman Granados 9/14/2024 7:28 AM  Dictation workstation:   [id number]      Physical Exam  HENT:      Head: Normocephalic.   Eyes:      General: Scleral icterus present.   Cardiovascular:      Rate and Rhythm: Normal rate and regular rhythm.    Pulmonary:      Effort: Pulmonary effort is normal.      Breath sounds: Rales present.   Abdominal:      General: Abdomen is flat.      Palpations: Abdomen is soft.   Skin:     General: Skin is warm.   Neurological:      Comments: Sedation RASS -1         Relevant Results             Results for orders placed or performed during the hospital encounter of 09/10/24 (from the past 24 hour(s))   POCT GLUCOSE   Result Value Ref Range    POCT Glucose 121 (H) 74 - 99 mg/dL   Basic metabolic panel   Result Value Ref Range    Glucose 118 (H) 74 - 99 mg/dL    Sodium 129 (L) 136 - 145 mmol/L    Potassium 4.0 3.5 - 5.3 mmol/L    Chloride 93 (L) 98 - 107 mmol/L    Bicarbonate 23 21 - 32 mmol/L    Anion Gap 17 10 - 20 mmol/L    Urea Nitrogen 67 (H) 6 - 23 mg/dL    Creatinine 5.07 (H) 0.50 - 1.05 mg/dL    eGFR 10 (L) >60 mL/min/1.73m*2    Calcium 7.2 (L) 8.6 - 10.6 mg/dL   CBC   Result Value Ref Range    WBC 24.8 (H) 4.4 - 11.3 x10*3/uL    nRBC 254.3 (H) 0.0 - 0.0 /100 WBCs    RBC 3.14 (L) 4.00 - 5.20 x10*6/uL    Hemoglobin 9.2 (L) 12.0 - 16.0 g/dL    Hematocrit 27.0 (L) 36.0 - 46.0 %    MCV 86 80 - 100 fL    MCH 29.3 26.0 - 34.0 pg    MCHC 34.1 32.0 - 36.0 g/dL    RDW 23.2 (H) 11.5 - 14.5 %    Platelets 57 (L) 150 - 450 x10*3/uL   POCT GLUCOSE   Result Value Ref Range    POCT Glucose 112 (H) 74 - 99 mg/dL   Heparin Assay, UFH   Result Value Ref Range    Heparin Unfractionated 0.1 See Comment Below for Therapeutic Ranges IU/mL   POCT GLUCOSE   Result Value Ref Range    POCT Glucose 116 (H) 74 - 99 mg/dL   POCT GLUCOSE   Result Value Ref Range    POCT Glucose 111 (H) 74 - 99 mg/dL   Lactate dehydrogenase   Result Value Ref Range    LDH 1,072 (H) 84 - 246 U/L   Reticulocytes   Result Value Ref Range    Retic % 15.4 (H) 0.5 - 2.0 %    Retic Absolute 0.479 (H) 0.018 - 0.083 x10*6/uL    Reticulocyte Hemoglobin 31 28 - 38 pg    Immature Retic fraction 35.0 (H) <=16.0 %   Haptoglobin   Result Value Ref Range    Haptoglobin <30 (L) 30 -  200 mg/dL   Basic metabolic panel   Result Value Ref Range    Glucose 84 74 - 99 mg/dL    Sodium 127 (L) 136 - 145 mmol/L    Potassium 4.2 3.5 - 5.3 mmol/L    Chloride 92 (L) 98 - 107 mmol/L    Bicarbonate 23 21 - 32 mmol/L    Anion Gap 16 10 - 20 mmol/L    Urea Nitrogen 65 (H) 6 - 23 mg/dL    Creatinine 5.09 (H) 0.50 - 1.05 mg/dL    eGFR 9 (L) >60 mL/min/1.73m*2    Calcium 6.9 (L) 8.6 - 10.6 mg/dL   CBC   Result Value Ref Range    WBC 22.4 (H) 4.4 - 11.3 x10*3/uL    nRBC 235.3 (H) 0.0 - 0.0 /100 WBCs    RBC 3.10 (L) 4.00 - 5.20 x10*6/uL    Hemoglobin 9.3 (L) 12.0 - 16.0 g/dL    Hematocrit 25.7 (L) 36.0 - 46.0 %    MCV 83 80 - 100 fL    MCH 30.0 26.0 - 34.0 pg    MCHC 36.2 (H) 32.0 - 36.0 g/dL    RDW 22.3 (H) 11.5 - 14.5 %    Platelets 64 (L) 150 - 450 x10*3/uL   Calcium, ionized   Result Value Ref Range    POCT Calcium, Ionized 0.97 (L) 1.1 - 1.33 mmol/L   Heparin Assay, UFH   Result Value Ref Range    Heparin Unfractionated 0.2 See Comment Below for Therapeutic Ranges IU/mL   Hepatic function panel   Result Value Ref Range    Albumin 2.2 (L) 3.4 - 5.0 g/dL    Bilirubin, Total 13.3 (H) 0.0 - 1.2 mg/dL    Bilirubin, Direct 8.0 (H) 0.0 - 0.3 mg/dL    Alkaline Phosphatase 255 (H) 33 - 110 U/L     (H) 7 - 45 U/L     (H) 9 - 39 U/L    Total Protein 4.5 (L) 6.4 - 8.2 g/dL   POCT GLUCOSE   Result Value Ref Range    POCT Glucose 104 (H) 74 - 99 mg/dL   POCT GLUCOSE   Result Value Ref Range    POCT Glucose 93 74 - 99 mg/dL   Heparin Assay, UFH   Result Value Ref Range    Heparin Unfractionated 0.3 See Comment Below for Therapeutic Ranges IU/mL     *Note: Due to a large number of results and/or encounters for the requested time period, some results have not been displayed. A complete set of results can be found in Results Review.     Scheduled medications  folic acid, 1 mg, oral, Daily  oxyCODONE, 10 mg, oral, q6h  pantoprazole, 40 mg, intravenous, Daily  piperacillin-tazobactam, 2.25 g, intravenous,  q6h  polyethylene glycol, 17 g, oral, BID  sennosides, 2 tablet, oral, BID  thiamine, 100 mg, intravenous, Daily      Continuous medications  dexmedeTOMIDine, 0.1-1.5 mcg/kg/hr, Last Rate: 0.4 mcg/kg/hr (09/14/24 1100)  fentaNYL, 0-300 mcg/hr, Last Rate: 25 mcg/hr (09/14/24 0700)  heparin, 0-4,000 Units/hr, Last Rate: 1,400 Units/hr (09/14/24 1046)  norepinephrine, 0.01-1 mcg/kg/min, Last Rate: 0.15 mcg/kg/min (09/14/24 1045)  propofol, 5-50 mcg/kg/min, Last Rate: Stopped (09/12/24 1005)  vasopressin, 0.03 Units/min, Last Rate: 0.03 Units/min (09/14/24 0931)      PRN medications  PRN medications: albuterol, alteplase, bisacodyl, dextrose, dextrose, fentaNYL, glucagon, glucagon, oxygen    Assessment/Plan      Senait Narvaez is a  54 y.o. 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      Patient with CHARLES stage 3 oliguric on CKD, with several previous episodes of CHARLES secondary to SCD pain crisis. Current CHARLES most likely secondary to shock/vasoocclusive crisis/multiorgan failure. Still on pressors. Azotemia remains high, but stable, urine output >1L yesterday. We suggest continue hemodynamic support keeping in goals per primary team.      #CHARLES stage 3 oliguric on CKD II (baseline eGFR 72 - Baseline creatinine 0.8-1)  #Severe hyperkalemia resolved  #Metabolic acidosis resolved   - Multifactorial, likely due to shock/venoocclusive crisis/multiorgan failure  - FeNa 1.2% (9/10)  - Creatinine during admission: 3.32 (9/10)--> 4.07 (9/11) -->5.34 (9/12)  - s/p IV contrast on 9/10 with CT scan  - UA: Protein 1+, leukocyte esterase 500 karina/uL, WBC >50, RBC 6-10  - Urine output  last 24 hours: 0.2-0.3 ml/kg/hr (9/11)  - Hyperkalemia: K: 5.6 (9/12)  - Hypocalcemia: 7.7 (9/12)  - Metabolic acidosis: stable, pH: 7.38, HCO3: 23.1 (9/12)  - Anemia: Hb 10.4 (9/12)     #Sickle cell crisis  - 4 pain crisis on current year  ##Hgb SC disease (previously on exchange transfusions q4-6 weeks, last transfusion 3/1/24)  - s/p exchange transfusion (6u pRBC) and 2u pRBC   ##Multiorgan failure   - Requiring 2 pressors   ##Hemolytic anemia  #Acute liver injury 2/2 hemodynamic instability versus sickle cell crisis   #Acute respiratory failure on Mechanical ventilation   #CAN (night oxygen use at home)  #pHTN (6/2023)  #Hx recurrent DVT/PE (on lifelong Coumadin)  #Hx of SVC syndrome     Kidney   - CHARLES on CKD, oliguric, Stage II  - Baseline creatinine: 0.8-1  - Etiology: Multifactorial. Shock, hypotension, vasooclussive crisis   - UA showed: RBC, Protein +1   - Urine electrolyes showed FeNA/FeUrea of  consistent  ATN (1-4) - Clinical volume status: Volume status managed by primary team  - Electrolytes (Na, K, Ca, Phos): Hyperkalemia, corrected stable  - Acid base status: Metabolic acidosis, corrected stable  Hemodynamics  - Blood pressures remained stable/rapidly fluctuating/low need any pressor support during hospital course.   - On HTN meds? Any changes needed?  Avoid nephrotoxins  - Contrast, hyperglycemia, NSAIDs, ischemia (anemia)  Medications (causative)              - Example - Zosyn and vancomycin, NSAIDs, ACE/ARBs, etc.   Medications (adjustments)              - Medication adjustments based off current GFR  Obstruction              -  Renal US: pending      Recommendations:  - Continue supportive care, goal blood pressure MAP>70  - Volume status managed by primary team, patient has acceptable urinary output. Try to avoid diuretics if possible  - Avoid nephrotoxins, contrast if possible  - strict Is/Os  - Renal dosing for medications for latest eGFR, follow medication trough as appropriate  (Vancomycin/Zosyn)  - Avoid hypotension/rapid fluctuations in Bps  - No indication for RRT at present; but consent obtained in event it becomes necessary over coming days  - Team will follow      Patient discussed with attending physician Dr. Fung.      Jeff Valdivia MD  PGY1 Internal Medicine

## 2024-09-15 ENCOUNTER — APPOINTMENT (OUTPATIENT)
Dept: RADIOLOGY | Facility: HOSPITAL | Age: 54
End: 2024-09-15
Payer: COMMERCIAL

## 2024-09-15 VITALS
HEIGHT: 65 IN | TEMPERATURE: 97.6 F | RESPIRATION RATE: 12 BRPM | HEART RATE: 60 BPM | DIASTOLIC BLOOD PRESSURE: 55 MMHG | BODY MASS INDEX: 44.81 KG/M2 | SYSTOLIC BLOOD PRESSURE: 130 MMHG | WEIGHT: 268.96 LBS | OXYGEN SATURATION: 96 %

## 2024-09-15 LAB
ABO GROUP (TYPE) IN BLOOD: NORMAL
ALBUMIN SERPL BCP-MCNC: 2.3 G/DL (ref 3.4–5)
ALBUMIN SERPL BCP-MCNC: 2.5 G/DL (ref 3.4–5)
ALP SERPL-CCNC: 229 U/L (ref 33–110)
ALP SERPL-CCNC: 249 U/L (ref 33–110)
ALT SERPL W P-5'-P-CCNC: 479 U/L (ref 7–45)
ALT SERPL W P-5'-P-CCNC: 583 U/L (ref 7–45)
ANION GAP BLDA CALCULATED.4IONS-SCNC: 13 MMO/L (ref 10–25)
ANION GAP SERPL CALC-SCNC: 16 MMOL/L (ref 10–20)
ANION GAP SERPL CALC-SCNC: 20 MMOL/L (ref 10–20)
ANTIBODY SCREEN: NORMAL
APPEARANCE UR: ABNORMAL
APTT PPP: 62 SECONDS (ref 27–38)
APTT PPP: 69 SECONDS (ref 27–38)
AST SERPL W P-5'-P-CCNC: 138 U/L (ref 9–39)
AST SERPL W P-5'-P-CCNC: 89 U/L (ref 9–39)
BACTERIA #/AREA URNS AUTO: ABNORMAL /HPF
BACTERIA BLD CULT: NORMAL
BASE EXCESS BLDA CALC-SCNC: -4.9 MMOL/L (ref -2–3)
BILIRUB DIRECT SERPL-MCNC: 11.2 MG/DL (ref 0–0.3)
BILIRUB DIRECT SERPL-MCNC: 9.9 MG/DL (ref 0–0.3)
BILIRUB SERPL-MCNC: 16.2 MG/DL (ref 0–1.2)
BILIRUB SERPL-MCNC: 16.3 MG/DL (ref 0–1.2)
BILIRUB UR STRIP.AUTO-MCNC: ABNORMAL MG/DL
BODY TEMPERATURE: 37 DEGREES CELSIUS
BUN SERPL-MCNC: 63 MG/DL (ref 6–23)
BUN SERPL-MCNC: 64 MG/DL (ref 6–23)
CA-I BLDA-SCNC: 1.07 MMOL/L (ref 1.1–1.33)
CALCIUM SERPL-MCNC: 7.2 MG/DL (ref 8.6–10.6)
CALCIUM SERPL-MCNC: 7.9 MG/DL (ref 8.6–10.6)
CHLORIDE BLDA-SCNC: 96 MMOL/L (ref 98–107)
CHLORIDE SERPL-SCNC: 92 MMOL/L (ref 98–107)
CHLORIDE SERPL-SCNC: 93 MMOL/L (ref 98–107)
CO2 SERPL-SCNC: 21 MMOL/L (ref 21–32)
CO2 SERPL-SCNC: 24 MMOL/L (ref 21–32)
COLOR UR: ABNORMAL
CORTIS AM PEAK SERPL-MSCNC: 30.1 UG/DL (ref 5–20)
CREAT SERPL-MCNC: 4.4 MG/DL (ref 0.5–1.05)
CREAT SERPL-MCNC: 4.8 MG/DL (ref 0.5–1.05)
EGFRCR SERPLBLD CKD-EPI 2021: 10 ML/MIN/1.73M*2
EGFRCR SERPLBLD CKD-EPI 2021: 11 ML/MIN/1.73M*2
ERYTHROCYTE [DISTWIDTH] IN BLOOD BY AUTOMATED COUNT: 22.3 % (ref 11.5–14.5)
ERYTHROCYTE [DISTWIDTH] IN BLOOD BY AUTOMATED COUNT: 22.7 % (ref 11.5–14.5)
GLUCOSE BLD MANUAL STRIP-MCNC: 106 MG/DL (ref 74–99)
GLUCOSE BLD MANUAL STRIP-MCNC: 84 MG/DL (ref 74–99)
GLUCOSE BLD MANUAL STRIP-MCNC: 89 MG/DL (ref 74–99)
GLUCOSE BLD MANUAL STRIP-MCNC: 98 MG/DL (ref 74–99)
GLUCOSE BLDA-MCNC: 84 MG/DL (ref 74–99)
GLUCOSE SERPL-MCNC: 67 MG/DL (ref 74–99)
GLUCOSE SERPL-MCNC: 87 MG/DL (ref 74–99)
GLUCOSE UR STRIP.AUTO-MCNC: NORMAL MG/DL
GRAM STN SPEC: NORMAL
GRAM STN SPEC: NORMAL
HAPTOGLOB SERPL NEPH-MCNC: 30 MG/DL (ref 30–200)
HCO3 BLDA-SCNC: 21.1 MMOL/L (ref 22–26)
HCT VFR BLD AUTO: 25.2 % (ref 36–46)
HCT VFR BLD AUTO: 25.9 % (ref 36–46)
HCT VFR BLD EST: 29 % (ref 36–46)
HGB BLD-MCNC: 9 G/DL (ref 12–16)
HGB BLD-MCNC: 9.2 G/DL (ref 12–16)
HGB BLDA-MCNC: 9.5 G/DL (ref 12–16)
HGB RETIC QN: 33 PG (ref 28–38)
HOLD SPECIMEN: NORMAL
IMMATURE RETIC FRACTION: 31.6 %
INHALED O2 CONCENTRATION: 30 %
INR PPP: 1.4 (ref 0.9–1.1)
INR PPP: 1.5 (ref 0.9–1.1)
KETONES UR STRIP.AUTO-MCNC: NEGATIVE MG/DL
LACTATE BLDA-SCNC: 1.1 MMOL/L (ref 0.4–2)
LACTATE SERPL-SCNC: 0.8 MMOL/L (ref 0.4–2)
LDH SERPL L TO P-CCNC: 902 U/L (ref 84–246)
LEUKOCYTE ESTERASE UR QL STRIP.AUTO: ABNORMAL
MAGNESIUM SERPL-MCNC: 2.38 MG/DL (ref 1.6–2.4)
MAGNESIUM SERPL-MCNC: 2.41 MG/DL (ref 1.6–2.4)
MCH RBC QN AUTO: 30.1 PG (ref 26–34)
MCH RBC QN AUTO: 30.9 PG (ref 26–34)
MCHC RBC AUTO-ENTMCNC: 35.5 G/DL (ref 32–36)
MCHC RBC AUTO-ENTMCNC: 35.7 G/DL (ref 32–36)
MCV RBC AUTO: 84 FL (ref 80–100)
MCV RBC AUTO: 87 FL (ref 80–100)
MUCOUS THREADS #/AREA URNS AUTO: ABNORMAL /LPF
NITRITE UR QL STRIP.AUTO: NEGATIVE
NRBC BLD-RTO: 120.6 /100 WBCS (ref 0–0)
NRBC BLD-RTO: 141.3 /100 WBCS (ref 0–0)
OXYHGB MFR BLDA: 92.2 % (ref 94–98)
PCO2 BLDA: 42 MM HG (ref 38–42)
PH BLDA: 7.31 PH (ref 7.38–7.42)
PH UR STRIP.AUTO: 6 [PH]
PLATELET # BLD AUTO: 63 X10*3/UL (ref 150–450)
PLATELET # BLD AUTO: 64 X10*3/UL (ref 150–450)
PO2 BLDA: 86 MM HG (ref 85–95)
POTASSIUM BLDA-SCNC: 4 MMOL/L (ref 3.5–5.3)
POTASSIUM SERPL-SCNC: 3.8 MMOL/L (ref 3.5–5.3)
POTASSIUM SERPL-SCNC: 4.2 MMOL/L (ref 3.5–5.3)
PROT SERPL-MCNC: 4.6 G/DL (ref 6.4–8.2)
PROT SERPL-MCNC: 5.5 G/DL (ref 6.4–8.2)
PROT UR STRIP.AUTO-MCNC: ABNORMAL MG/DL
PROTHROMBIN TIME: 15.9 SECONDS (ref 9.8–12.8)
PROTHROMBIN TIME: 17.1 SECONDS (ref 9.8–12.8)
RBC # BLD AUTO: 2.98 X10*6/UL (ref 4–5.2)
RBC # BLD AUTO: 2.99 X10*6/UL (ref 4–5.2)
RBC # UR STRIP.AUTO: ABNORMAL /UL
RBC #/AREA URNS AUTO: >20 /HPF
RETICS #: 0.56 X10*6/UL (ref 0.02–0.08)
RETICS/RBC NFR AUTO: 18.9 % (ref 0.5–2)
RH FACTOR (ANTIGEN D): NORMAL
SAO2 % BLDA: 97 % (ref 94–100)
SODIUM BLDA-SCNC: 126 MMOL/L (ref 136–145)
SODIUM SERPL-SCNC: 128 MMOL/L (ref 136–145)
SODIUM SERPL-SCNC: 130 MMOL/L (ref 136–145)
SP GR UR STRIP.AUTO: 1.02
SQUAMOUS #/AREA URNS AUTO: ABNORMAL /HPF
TRANS CELLS #/AREA UR COMP ASSIST: ABNORMAL /HPF
TSH SERPL-ACNC: 5.1 MIU/L (ref 0.44–3.98)
UFH PPP CHRO-ACNC: 0.3 IU/ML
UROBILINOGEN UR STRIP.AUTO-MCNC: ABNORMAL MG/DL
WBC # BLD AUTO: 21.4 X10*3/UL (ref 4.4–11.3)
WBC # BLD AUTO: 22.7 X10*3/UL (ref 4.4–11.3)
WBC #/AREA URNS AUTO: ABNORMAL /HPF

## 2024-09-15 PROCEDURE — 99223 1ST HOSP IP/OBS HIGH 75: CPT | Performed by: DERMATOLOGY

## 2024-09-15 PROCEDURE — 80048 BASIC METABOLIC PNL TOTAL CA: CPT

## 2024-09-15 PROCEDURE — 86036 ANCA SCREEN EACH ANTIBODY: CPT

## 2024-09-15 PROCEDURE — 83735 ASSAY OF MAGNESIUM: CPT

## 2024-09-15 PROCEDURE — 81001 URINALYSIS AUTO W/SCOPE: CPT | Performed by: INTERNAL MEDICINE

## 2024-09-15 PROCEDURE — 85610 PROTHROMBIN TIME: CPT

## 2024-09-15 PROCEDURE — 99291 CRITICAL CARE FIRST HOUR: CPT

## 2024-09-15 PROCEDURE — 82533 TOTAL CORTISOL: CPT | Performed by: INTERNAL MEDICINE

## 2024-09-15 PROCEDURE — 82248 BILIRUBIN DIRECT: CPT

## 2024-09-15 PROCEDURE — 84132 ASSAY OF SERUM POTASSIUM: CPT

## 2024-09-15 PROCEDURE — 2500000001 HC RX 250 WO HCPCS SELF ADMINISTERED DRUGS (ALT 637 FOR MEDICARE OP)

## 2024-09-15 PROCEDURE — 86635 COCCIDIOIDES ANTIBODY: CPT

## 2024-09-15 PROCEDURE — 2500000004 HC RX 250 GENERAL PHARMACY W/ HCPCS (ALT 636 FOR OP/ED)

## 2024-09-15 PROCEDURE — 83615 LACTATE (LD) (LDH) ENZYME: CPT

## 2024-09-15 PROCEDURE — 87205 SMEAR GRAM STAIN: CPT

## 2024-09-15 PROCEDURE — 80053 COMPREHEN METABOLIC PANEL: CPT

## 2024-09-15 PROCEDURE — 83605 ASSAY OF LACTIC ACID: CPT

## 2024-09-15 PROCEDURE — 0HB5XZX EXCISION OF CHEST SKIN, EXTERNAL APPROACH, DIAGNOSTIC: ICD-10-PCS | Performed by: DERMATOLOGY

## 2024-09-15 PROCEDURE — 86612 BLASTOMYCES ANTIBODY: CPT

## 2024-09-15 PROCEDURE — 84100 ASSAY OF PHOSPHORUS: CPT

## 2024-09-15 PROCEDURE — 84443 ASSAY THYROID STIM HORMONE: CPT | Performed by: INTERNAL MEDICINE

## 2024-09-15 PROCEDURE — 93010 ELECTROCARDIOGRAM REPORT: CPT | Performed by: INTERNAL MEDICINE

## 2024-09-15 PROCEDURE — 88346 IMFLUOR 1ST 1ANTB STAIN PX: CPT | Performed by: DERMATOLOGY

## 2024-09-15 PROCEDURE — 87086 URINE CULTURE/COLONY COUNT: CPT | Performed by: INTERNAL MEDICINE

## 2024-09-15 PROCEDURE — 37799 UNLISTED PX VASCULAR SURGERY: CPT

## 2024-09-15 PROCEDURE — 84295 ASSAY OF SERUM SODIUM: CPT

## 2024-09-15 PROCEDURE — 87385 HISTOPLASMA CAPSUL AG IA: CPT

## 2024-09-15 PROCEDURE — 87449 NOS EACH ORGANISM AG IA: CPT

## 2024-09-15 PROCEDURE — 36415 COLL VENOUS BLD VENIPUNCTURE: CPT

## 2024-09-15 PROCEDURE — 86901 BLOOD TYPING SEROLOGIC RH(D): CPT

## 2024-09-15 PROCEDURE — 88350 IMFLUOR EA ADDL 1ANTB STN PX: CPT | Performed by: DERMATOLOGY

## 2024-09-15 PROCEDURE — 74018 RADEX ABDOMEN 1 VIEW: CPT

## 2024-09-15 PROCEDURE — 85520 HEPARIN ASSAY: CPT

## 2024-09-15 PROCEDURE — 11104 PUNCH BX SKIN SINGLE LESION: CPT | Performed by: DERMATOLOGY

## 2024-09-15 PROCEDURE — 2500000005 HC RX 250 GENERAL PHARMACY W/O HCPCS

## 2024-09-15 PROCEDURE — 85027 COMPLETE CBC AUTOMATED: CPT

## 2024-09-15 PROCEDURE — 83010 ASSAY OF HAPTOGLOBIN QUANT: CPT

## 2024-09-15 PROCEDURE — 82947 ASSAY GLUCOSE BLOOD QUANT: CPT

## 2024-09-15 PROCEDURE — 83516 IMMUNOASSAY NONANTIBODY: CPT

## 2024-09-15 PROCEDURE — 2020000001 HC ICU ROOM DAILY

## 2024-09-15 PROCEDURE — 11105 PUNCH BX SKIN EA SEP/ADDL: CPT | Performed by: DERMATOLOGY

## 2024-09-15 PROCEDURE — 82330 ASSAY OF CALCIUM: CPT

## 2024-09-15 PROCEDURE — 74018 RADEX ABDOMEN 1 VIEW: CPT | Performed by: STUDENT IN AN ORGANIZED HEALTH CARE EDUCATION/TRAINING PROGRAM

## 2024-09-15 PROCEDURE — 86606 ASPERGILLUS ANTIBODY: CPT

## 2024-09-15 PROCEDURE — 99233 SBSQ HOSP IP/OBS HIGH 50: CPT | Performed by: INTERNAL MEDICINE

## 2024-09-15 PROCEDURE — 85045 AUTOMATED RETICULOCYTE COUNT: CPT

## 2024-09-15 PROCEDURE — 87040 BLOOD CULTURE FOR BACTERIA: CPT

## 2024-09-15 PROCEDURE — 86641 CRYPTOCOCCUS ANTIBODY: CPT

## 2024-09-15 RX ORDER — ATROPINE SULFATE 0.1 MG/ML
INJECTION INTRAVENOUS
Status: DISPENSED
Start: 2024-09-15 | End: 2024-09-16

## 2024-09-15 RX ORDER — MICAFUNGIN SODIUM 100 MG/5ML
100 INJECTION, POWDER, LYOPHILIZED, FOR SOLUTION INTRAVENOUS EVERY 24 HOURS
Status: DISCONTINUED | OUTPATIENT
Start: 2024-09-15 | End: 2024-09-15

## 2024-09-15 ASSESSMENT — ENCOUNTER SYMPTOMS
ABDOMINAL PAIN: 1
HEMATURIA: 0
ARTHRALGIAS: 1
EYE REDNESS: 0
EYE DISCHARGE: 0
BLOOD IN STOOL: 0
DIFFICULTY URINATING: 0
CONSTIPATION: 0

## 2024-09-15 ASSESSMENT — PAIN - FUNCTIONAL ASSESSMENT
PAIN_FUNCTIONAL_ASSESSMENT: CPOT (CRITICAL CARE PAIN OBSERVATION TOOL)

## 2024-09-15 ASSESSMENT — PAIN SCALES - GENERAL
PAINLEVEL_OUTOF10: 0 - NO PAIN
PAINLEVEL_OUTOF10: 0 - NO PAIN

## 2024-09-15 NOTE — PROGRESS NOTES
"Medical Intensive Care - Daily Progress Note   Subjective    Senait Narvaez is a 54 y.o. female patient admitted on 9/10/2024 with following ICU needs: hypotension requiring vasopressors and acidosis with inappropriate respiratory compensation requiring intubation    Interval History:  Overnight, patient had bowel movements. No further feculent material from OG but has been vomiting/dry heaving overnight with 600cc out of OG from wall suction. Nursing held off on restarting trickle tube feeds for that reason. After worsening leukocytosis, overnight team repeated blood cx and started micafungin. No other events.    Meds    Scheduled medications  diphenhydramine-zinc acetate, , Topical, TID  folic acid, 1 mg, oral, Daily  oxyCODONE, 10 mg, oral, q6h  pantoprazole, 40 mg, intravenous, Daily  piperacillin-tazobactam, 2.25 g, intravenous, q6h  polyethylene glycol, 17 g, oral, BID  sennosides, 2 tablet, oral, BID  thiamine, 100 mg, intravenous, Daily      Continuous medications  dexmedeTOMIDine, 0.1-1.5 mcg/kg/hr, Last Rate: 0.4 mcg/kg/hr (09/15/24 0725)  fentaNYL, 0-300 mcg/hr, Last Rate: 25 mcg/hr (09/15/24 0725)  heparin, 0-4,000 Units/hr, Last Rate: 1,400 Units/hr (09/15/24 0725)  norepinephrine, 0.01-1 mcg/kg/min, Last Rate: 0.12 mcg/kg/min (09/15/24 0725)  propofol, 5-50 mcg/kg/min, Last Rate: Stopped (09/12/24 1005)  vasopressin, 0.03 Units/min, Last Rate: 0.03 Units/min (09/15/24 0725)      PRN medications  PRN medications: albuterol, alteplase, bisacodyl, dextrose, dextrose, fentaNYL, glucagon, glucagon, oxygen     Objective    Blood pressure 130/55, pulse 59, temperature 36.8 °C (98.2 °F), resp. rate 12, height 1.651 m (5' 5\"), weight 122 kg (268 lb 15.4 oz), SpO2 94%.     Temp  Min: 36.6 °C (97.9 °F)  Max: 36.8 °C (98.2 °F)  Pulse  Min: 59  Max: 73  Resp  Min: 12  Max: 18  SpO2  Min: 92 %  Max: 100 %    Physical Exam  Constitutional:       Appearance: She is ill-appearing.      Interventions: She is intubated.     "  Comments: Obese, RASS -2   Cardiovascular:      Rate and Rhythm: Normal rate and regular rhythm.      Pulses: Normal pulses.      Heart sounds: Normal heart sounds.   Pulmonary:      Effort: She is intubated.      Comments: Rhonchorous breath sounds bilaterally, left worse than right  Abdominal:      General: Abdomen is flat. Bowel sounds are normal.      Palpations: Abdomen is soft.   Skin:     General: Skin is warm and dry.      Findings: Rash (Nonraised pigmented rash appearing circumferentially around bilateral areola on the breast. Rash also present in the groin.) present.          Intake/Output Summary (Last 24 hours) at 9/15/2024 1111  Last data filed at 9/15/2024 0725  Gross per 24 hour   Intake 1536.06 ml   Output 1295 ml   Net 241.06 ml     Net IO Since Admission: 6,020.13 mL [09/15/24 1111]     Labs:   Results from last 72 hours   Lab Units 09/15/24  0457 09/14/24  1650 09/14/24  0450   HEMOGLOBIN g/dL 9.2* 8.9* 9.3*   HEMATOCRIT % 25.9* 24.5* 25.7*   WBC AUTO x10*3/uL 22.7* 20.2* 22.4*   PLATELETS AUTO x10*3/uL 63* 64* 64*      Results from last 72 hours   Lab Units 09/15/24  0457 09/14/24  1650 09/14/24  0450 09/12/24  2301 09/12/24  1809   SODIUM mmol/L 130* 126* 127*   < > 129*   POTASSIUM mmol/L 4.2 4.1 4.2   < > 4.8   CHLORIDE mmol/L 93* 91* 92*   < > 93*   CO2 mmol/L 21 23 23   < > 24   BUN mg/dL 64* 63* 65*   < > 68*   CREATININE mg/dL 4.80* 5.05* 5.09*   < > 5.42*   GLUCOSE mg/dL 67* 81 84   < > 107*   CALCIUM mg/dL 7.2* 7.8* 6.9*   < > 7.3*   ANION GAP mmol/L 20 16 16   < > 17   EGFR mL/min/1.73m*2 10* 10* 9*   < > 9*   PHOSPHORUS mg/dL  --   --   --   --  5.1*    < > = values in this interval not displayed.        Micro/ID:   Lab Results   Component Value Date    BLOODCULT Loaded on Instrument - Culture in progress 09/15/2024       Summary of key imaging results from the last 24 hours  KUB 9/15: Multiple gas-filled prominent and mildly dilated small bowel loops, similar to prior radiograph  from yesterday. Findings likely represent ileus and attention on follow-up imaging is recommended. Mild to moderate colonic stool burden, slightly improved from prior.    Assessment and Plan     Assessment: Senait Narvaez is a 54 y.o. 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 Patient critically ill with hypotension initially requiring 2 pressors, CHARLES, acute liver injury possibly 2/2 congestive hepatopathy, hemolytic anemia requiring transfusions, altered mentation with lethargy and not compensating appropriately respiratory wise for acidosis requiring intubation.     Mechanical Ventilation: 4-10 days  Sedation/Analgesia:  Precedex, Fentanyl, oxycodone  Restraints: Restraints indicated as alternative therapies have been attempted and have been ineffective.  Restrain with soft wrist restraints and side rails up x4 until medical devices discontinued and/or patient able to participate with plan of care.      Summary for 09/14/24:  Continue tube feeds as tolerated. Continue aggressive bowel regimen with MiraLAX BID, senna BID, and additional enema today.  Planning to wean pressors to systolic . Possible SBT and extubation today.  Making good urine in setting of CHARLES with down trending Cr.  Holding micafungin started by overnight team. Ordered fungal serologic marker workup.  Rash remains present over b/l breasts: non-erythematous, no purulence. Drug reaction (acneiform eruption?) vs less likely cellulitis/mastitis. noted prior to the adding of precedex. Treated with anti-histamine cream yesterday. Consulted dermatology today.      Plan:  NEUROLOGY/PSYCH:  #AMS  ::Likely in setting of critical illness with contribution of hypoglycemia  ::Intubated, sedated  ::CTH negative  c/w fentanyl, weaning as tolerated. Continue oxycodone 10 mg q6h.  thiamine 500mg TID, c/w thiamine 100mg daily     #Cauda equina with saddle numbness  no acute interventions     CARDIOVASCULAR:  #Hypotension, septic vs hypovolemic vs cardiogenic shock  ::Mixed So2 68 demonstrates not entirely septic, may also be hypovolemic vs less likely cardiogenic shock given TTE findings  continue levo and vaso, wean to systolic  mmHg  Ordered 500cc bolus NS  S/p 2u pRBCs 9/10  S/p exchange transfusion 9/11 AM  If worsening pressor requirements, will consider adding stress dose steroids     #Elevated troponins, now downtrending  #EKG with ST depressions, likely Type II MI due to vaso-occlusive crisis and anemia  ::TTE 9/11: EF 60-65%, RV enlarged and reduced in function which appears stable when compared to last TTE (1/2024)  ::Troponin leak is likely due to cardiac demand vs supply mismatch in the setting of worsening anemia and vaso-occlusive crisis  cardiology consulted, now signed off without indication to treat as ACS  Will consider V/Q scan if respiratory status worsens with a strong suspicion for PE     #Hx SVT  -hold home metoprolol tartrate 12.5mg BID     PULMONARY:  #Intubated  #CAN  #pHTN (6/2023)  #Chronic 2L O2 at night use  ::On 2L O2 on arrival, denying dyspnea and cough  ::CT chest with signs of pulmonary edema  wean vent settings as tolerated  albuterol nebulizer for wheezing  hold home Lasix 20  Potential SBT today if able to wean vasopressors     RENAL/GENITOURINARY:  #CHARLES on CKD II, now polyuric phase  #Severe hyperkalemia with previous peaked T waves on EKG, resolving  ::Pre-renal CHARLES likely iso hypotension  ::Baseline cre 0.8-1, now 5.09, downtrending  ::Now s/p IV contrast on 9/10 with CT scan  ::9/10 OSH UA: turbid, 2+ blood, 1+ protein, 250LE, negative  nitrite, rare bacteria, few squamous  ::FeNa 1.2%, consistent with ATN  monitor BMP BID  Hyperkalemia cocktail as needed, consider adding Lokalema if potassium persistently elevated  Nephrology consulted, no need for RRT at this time     #Hyponatremia  Improving, continue to monitor    #Mixed HAGMA/NAGMA with secondary respiratory alkalosis  #Elevated lactate (likely Type A)  trend ABG and lactate     #Hypocalcemia  Replete PRN     GASTROENTEROLOGY:  #Acute liver injury 2/2 hemodynamic instability versus sickle cell crisis  ::On presentation to  MICU ALT 2611, AST 4727, T bili 10.2, INR 3.0, now improving  ::CT with hepatic venous congestion, patient lacks gallbladder  ::Liver US with Doppler: Diffuse fatty infiltration, unremarkable doppler interrogation of the liver.  ::EBV IgG positive, IgM negative  ::CMV IgG positive, PCR negative  ::Acute hepatitis panel unremarkable  ::CK elevated (944) at admission  trend MELD labs  Hepatology consulted, now signed off    #Indirect hyperbilirubinuria, worsening  Iso improving hemolysis labs, may be secondary to impaired bile secretion iso hepatobiliary injury.   Continue to monitor     #Ileus on KUB  #Constipation, recurrent severe  c/w sennosides BID, miralax BID, suppository  Restarted trickle tube feeds after cessation of feculent material from OG suction    ENDOCRINOLOGY:  #Hypoglycemia  ::Likely iso critical illness  q4hr glucose checks with hypoglycemia protocol  start D10 gtt as needed     HEMATOLOGY:  #Sickle cell crisis  #Hgb SC disease (previously on exchange transfusions q4-6 weeks, last transfusion before admission 3/1/24)  #Hemolytic anemia  ::LDH>12,000 (now downtrending), haptoglobin <30, fibrinogen 318, retic % 5.2 on presentation  ::Peripheral smear without helmet cells  ::transfusion medicine and hematology teams on board, appreciate recs  ::Exchange transfusion completed 9/11, no plans for additional transfusion at this time  daily LDH, retics  Pain  management with fentanyl 25mcg PRN, oxycodone 10 mg q6h     #Hx recurrent DVT/PE (on lifelong Coumadin)  # hx of SVC syndrome  ::Per family, facial/neck appearance on presentation at her baseline  ::S/p FFP 9/11  Hx SVC stricture s/p SVC angioplasty  B/l Upper Extremity and Facial Swelling d/t partial filling defect within the SVC and a thrombus of the SVC complicated by bilateral Mediport catheters. On lifelong anticoagulation, DOAC discouraged due to high risk of clotting.  hold home warfarin 5mg daily, trend INR. Goal 2-3.  INR decreasing, continue heparin gtt     MUSCULOSKELETAL/ SKIN:  #Fibromyalgia  #Raynaud's disease  #Muscle spasms  -no active interventions at this time  -holding home flexuril 10mg TID prn muscle spasms due to AMS    #Rash  -Present over b/l breasts: non-erythematous, no purulence. Drug reaction (acneiform eruption?) vs less likely cellulitis/mastitis. noted prior to the adding of precedex. Treated with anti-histamine cream yesterday.  Consult dermatology today.     INFECTIOUS DISEASE:  #Leukocytosis  #Concern for some component of sepsis  ::Unclear infectious source. No acute findings on CT C/A/P. Patient had denied urinary symptoms prior to intubation.  ::Procal elevated 7.09  ::S/p 2L fluids  :: Blood cultures 9/10 NGTD x 3 day  :: Blood cultures 9/12: NGTD x 1 day  :: Blood cultures 9/15: awaiting results  Discontinued vancomycin, continue zosyn (day 6 of 7)  Holding micafungin started by overnight team. Ordered fungal serologic marker workup.     MISC:  -holding home vitamin C, elderberry, loratidine 10mg, zofran 4mg, oxycodone 10mg q4h prn, MVI    ICU Check List              ICU Liberation: Intervention:   Assess, Prevent, Manage Pain CPOT - Fentanyl bolus PRN   Both SAT and SBT [x] SAT  [x] SBT 30-60 min [] Extubate to NC  [] Extubate to NIV  [] Discuss Trach   Choice of analgesia and sedation RASS: 0/-1  Fentanyl     Delirium: Assess, prevent and manage CAM -     Early Mobility and  Exercise   [x] PT /OT consult   Family Engagement and Empowerment [x]Family updated []SW consult      FEN   Fluids: PRN  Electrolytes: PRN  Nutrition: Enteral feeding with NPO Nepro; 10 (10mls q6hrs until goal rate of 30mls/hr); 200 (cc); Water; Other (specify); Every 8 hours      Prophylaxis:  DVT ppx: SCDs, heparin gtt, holding home warfarin  GI ppx: PPI  Bowel care: MiraLAX BID, senna BID, suppository PRN     Hardware:  Catheter: Right internal jugular Trialysis (placed 9/10/24), right femoral CVC (placed 9/11/24)  Access: pIV, OG  Drains: Slater  Lines: L radial [place] (placed 9/10/24)     Social:  Code: Full Code   HPOA: Tati Salgado (mother) [phone]. Daughter Tiesha ([phone]) also involved with patient's care.  Disposition: MICU pending extubation     Salvador Rosario MD   09/15/24 at 11:11 AM     Disclaimer: Documentation completed with the information available at the time of input. The times in the chart may not be reflective of actual patient care times, interventions, or procedures. Documentation occurs after the physical care of the patient.

## 2024-09-15 NOTE — PROGRESS NOTES
Senait Narvaez   54 y.o.    @WT@  MRN/Room: [id number]    Subjective: Remains intubated, sedated     Objective:     Meds:   diphenhydramine-zinc acetate, , TID  folic acid, 1 mg, Daily  oxyCODONE, 10 mg, q6h  pantoprazole, 40 mg, Daily  piperacillin-tazobactam, 2.25 g, q6h  polyethylene glycol, 17 g, BID  sennosides, 2 tablet, BID  thiamine, 100 mg, Daily      dexmedeTOMIDine, Last Rate: 0.4 mcg/kg/hr (09/15/24 0725)  fentaNYL, Last Rate: 25 mcg/hr (09/15/24 0725)  heparin, Last Rate: 1,400 Units/hr (09/15/24 0725)  norepinephrine, Last Rate: 0.12 mcg/kg/min (09/15/24 0725)  propofol, Last Rate: Stopped (09/12/24 1005)  vasopressin, Last Rate: 0.03 Units/min (09/15/24 0725)      albuterol, 2.5 mg, q6h PRN  alteplase, 2 mg, PRN  dextrose, 12.5 g, q15 min PRN  dextrose, 25 g, q15 min PRN  fentaNYL, 25 mcg, q10 min PRN  glucagon, 1 mg, q15 min PRN  glucagon, 1 mg, q15 min PRN  oxygen, , Continuous PRN - O2/gases        Vitals:    09/15/24 1200   BP:    Pulse:    Resp:    Temp: 36.9 °C (98.4 °F)   SpO2:           Intake/Output Summary (Last 24 hours) at 9/15/2024 1238  Last data filed at 9/15/2024 0725  Gross per 24 hour   Intake 1469.06 ml   Output 1295 ml   Net 174.06 ml       General appearance: intubated, sedated   Eyes: non-icteric  Heart: RRR  Lungs: CTA bilat   Abdomen: soft, nt/nd  Extremities: no edema bilat  Slater  Access: R trialysis     Blood Labs:  Results for orders placed or performed during the hospital encounter of 09/10/24 (from the past 24 hour(s))   Heparin Assay, UFH   Result Value Ref Range    Heparin Unfractionated 0.3 See Comment Below for Therapeutic Ranges IU/mL   POCT GLUCOSE   Result Value Ref Range    POCT Glucose 87 74 - 99 mg/dL   Hepatic Function Panel   Result Value Ref Range    Albumin 2.5 (L) 3.4 - 5.0 g/dL    Bilirubin, Total 14.7 (H) 0.0 - 1.2 mg/dL    Bilirubin, Direct 9.0 (H) 0.0 - 0.3 mg/dL    Alkaline Phosphatase 234 (H) 33 - 110 U/L     (H) 7 - 45 U/L     (H) 9  - 39 U/L    Total Protein 5.2 (L) 6.4 - 8.2 g/dL   Magnesium   Result Value Ref Range    Magnesium 2.47 (H) 1.60 - 2.40 mg/dL   Coagulation Screen   Result Value Ref Range    Protime 14.4 (H) 9.8 - 12.8 seconds    INR 1.3 (H) 0.9 - 1.1    aPTT 76 (H) 27 - 38 seconds   Basic metabolic panel   Result Value Ref Range    Glucose 81 74 - 99 mg/dL    Sodium 126 (L) 136 - 145 mmol/L    Potassium 4.1 3.5 - 5.3 mmol/L    Chloride 91 (L) 98 - 107 mmol/L    Bicarbonate 23 21 - 32 mmol/L    Anion Gap 16 10 - 20 mmol/L    Urea Nitrogen 63 (H) 6 - 23 mg/dL    Creatinine 5.05 (H) 0.50 - 1.05 mg/dL    eGFR 10 (L) >60 mL/min/1.73m*2    Calcium 7.8 (L) 8.6 - 10.6 mg/dL   CBC   Result Value Ref Range    WBC 20.2 (H) 4.4 - 11.3 x10*3/uL    nRBC 197.9 (H) 0.0 - 0.0 /100 WBCs    RBC 2.99 (L) 4.00 - 5.20 x10*6/uL    Hemoglobin 8.9 (L) 12.0 - 16.0 g/dL    Hematocrit 24.5 (L) 36.0 - 46.0 %    MCV 82 80 - 100 fL    MCH 29.8 26.0 - 34.0 pg    MCHC 36.3 (H) 32.0 - 36.0 g/dL    RDW 22.1 (H) 11.5 - 14.5 %    Platelets 64 (L) 150 - 450 x10*3/uL   Fibrinogen   Result Value Ref Range    Fibrinogen 550 (H) 200 - 400 mg/dL   POCT GLUCOSE   Result Value Ref Range    POCT Glucose 84 74 - 99 mg/dL   POCT GLUCOSE   Result Value Ref Range    POCT Glucose 85 74 - 99 mg/dL   Type And Screen   Result Value Ref Range    ABO TYPE AB     Rh TYPE POS     ANTIBODY SCREEN NEG    Blood Culture    Specimen: Peripheral Venipuncture; Blood culture   Result Value Ref Range    Blood Culture Loaded on Instrument - Culture in progress    Respiratory Culture/Smear    Specimen: Tracheal Aspirate; Fluid   Result Value Ref Range    Gram Stain (3+) Moderate Polymorphonuclear leukocytes     Gram Stain No organisms seen    Urinalysis with Reflex Culture and Microscopic   Result Value Ref Range    Color, Urine Dark-Yellow Light-Yellow, Yellow, Dark-Yellow    Appearance, Urine Turbid (N) Clear    Specific Gravity, Urine 1.017 1.005 - 1.035    pH, Urine 6.0 5.0, 5.5, 6.0, 6.5, 7.0,  7.5, 8.0    Protein, Urine 30 (1+) (A) NEGATIVE, 10 (TRACE), 20 (TRACE) mg/dL    Glucose, Urine Normal Normal mg/dL    Blood, Urine 1.0 (3+) (A) NEGATIVE    Ketones, Urine NEGATIVE NEGATIVE mg/dL    Bilirubin, Urine 2 (2+) (A) NEGATIVE    Urobilinogen, Urine 3 (1+) (A) Normal mg/dL    Nitrite, Urine NEGATIVE NEGATIVE    Leukocyte Esterase, Urine 250 Cande/µL (A) NEGATIVE   Microscopic Only, Urine   Result Value Ref Range    WBC, Urine 21-50 (A) 1-5, NONE /HPF    RBC, Urine >20 (A) NONE, 1-2, 3-5 /HPF    Squamous Epithelial Cells, Urine 1-9 (SPARSE) Reference range not established. /HPF    Transitional Epithelial Cells, Urine 1-2 (FEW) Reference range not established. /HPF    Bacteria, Urine 1+ (A) NONE SEEN /HPF    Mucus, Urine FEW Reference range not established. /LPF   Lactate dehydrogenase   Result Value Ref Range     (H) 84 - 246 U/L   Reticulocytes   Result Value Ref Range    Retic % 18.9 (H) 0.5 - 2.0 %    Retic Absolute 0.563 (H) 0.018 - 0.083 x10*6/uL    Reticulocyte Hemoglobin 33 28 - 38 pg    Immature Retic fraction 31.6 (H) <=16.0 %   Haptoglobin   Result Value Ref Range    Haptoglobin 30 30 - 200 mg/dL   Hepatic Function Panel   Result Value Ref Range    Albumin 2.3 (L) 3.4 - 5.0 g/dL    Bilirubin, Total 16.2 (H) 0.0 - 1.2 mg/dL    Bilirubin, Direct 9.9 (H) 0.0 - 0.3 mg/dL    Alkaline Phosphatase 249 (H) 33 - 110 U/L     (H) 7 - 45 U/L     (H) 9 - 39 U/L    Total Protein 4.6 (L) 6.4 - 8.2 g/dL   Magnesium   Result Value Ref Range    Magnesium 2.38 1.60 - 2.40 mg/dL   Coagulation Screen   Result Value Ref Range    Protime 15.9 (H) 9.8 - 12.8 seconds    INR 1.4 (H) 0.9 - 1.1    aPTT 69 (H) 27 - 38 seconds   Basic metabolic panel   Result Value Ref Range    Glucose 67 (L) 74 - 99 mg/dL    Sodium 130 (L) 136 - 145 mmol/L    Potassium 4.2 3.5 - 5.3 mmol/L    Chloride 93 (L) 98 - 107 mmol/L    Bicarbonate 21 21 - 32 mmol/L    Anion Gap 20 10 - 20 mmol/L    Urea Nitrogen 64 (H) 6 - 23 mg/dL     Creatinine 4.80 (H) 0.50 - 1.05 mg/dL    eGFR 10 (L) >60 mL/min/1.73m*2    Calcium 7.2 (L) 8.6 - 10.6 mg/dL   CBC   Result Value Ref Range    WBC 22.7 (H) 4.4 - 11.3 x10*3/uL    nRBC 141.3 (H) 0.0 - 0.0 /100 WBCs    RBC 2.98 (L) 4.00 - 5.20 x10*6/uL    Hemoglobin 9.2 (L) 12.0 - 16.0 g/dL    Hematocrit 25.9 (L) 36.0 - 46.0 %    MCV 87 80 - 100 fL    MCH 30.9 26.0 - 34.0 pg    MCHC 35.5 32.0 - 36.0 g/dL    RDW 22.7 (H) 11.5 - 14.5 %    Platelets 63 (L) 150 - 450 x10*3/uL   Cortisol AM   Result Value Ref Range    Cortisol  A.M. 30.1 (H) 5.0 - 20.0 ug/dL   TSH   Result Value Ref Range    Thyroid Stimulating Hormone 5.10 (H) 0.44 - 3.98 mIU/L   Heparin Assay, UFH   Result Value Ref Range    Heparin Unfractionated 0.3 See Comment Below for Therapeutic Ranges IU/mL   POCT GLUCOSE   Result Value Ref Range    POCT Glucose 89 74 - 99 mg/dL   BLOOD GAS ARTERIAL FULL PANEL   Result Value Ref Range    POCT pH, Arterial 7.31 (L) 7.38 - 7.42 pH    POCT pCO2, Arterial 42 38 - 42 mm Hg    POCT pO2, Arterial 86 85 - 95 mm Hg    POCT SO2, Arterial 97 94 - 100 %    POCT Oxy Hemoglobin, Arterial 92.2 (L) 94.0 - 98.0 %    POCT Hematocrit Calculated, Arterial 29.0 (L) 36.0 - 46.0 %    POCT Sodium, Arterial 126 (L) 136 - 145 mmol/L    POCT Potassium, Arterial 4.0 3.5 - 5.3 mmol/L    POCT Chloride, Arterial 96 (L) 98 - 107 mmol/L    POCT Ionized Calcium, Arterial 1.07 (L) 1.10 - 1.33 mmol/L    POCT Glucose, Arterial 84 74 - 99 mg/dL    POCT Lactate, Arterial 1.1 0.4 - 2.0 mmol/L    POCT Base Excess, Arterial -4.9 (L) -2.0 - 3.0 mmol/L    POCT HCO3 Calculated, Arterial 21.1 (L) 22.0 - 26.0 mmol/L    POCT Hemoglobin, Arterial 9.5 (L) 12.0 - 16.0 g/dL    POCT Anion Gap, Arterial 13 10 - 25 mmo/L    Patient Temperature 37.0 degrees Celsius    FiO2 30 %   POCT GLUCOSE   Result Value Ref Range    POCT Glucose 98 74 - 99 mg/dL     *Note: Due to a large number of results and/or encounters for the requested time period, some results have not been  displayed. A complete set of results can be found in Results Review.              ASSESSMENT:  55yo F with PMH HbSS admitted due to painful crisis, undifferentiated shock, respiratory failure. Required exchange transfusion. Nephrology consulted for CHARLES.     #CHARLES, non oliguric - Baseline Cr 0.8-1.0  Etiology: Secondary to ischemic ATN due to shock (undifferentiated)  -UA: 1+ protein, +RBC, +WBC  -volume status: euvolemic  -hemodynamics: remains on levophed, vasopressin     #Sickle cell  #Pain crisis  #Elevated LFT  -s/p exchange transfusion      RECOMMENDATIONS:  -ok to keep trialysis line in place given hemodynamic instability   -supportive management per ICU team  -hold off on diuresis, maintain net even fluid balance  -No acute indication for dialysis at this time   -strict I/O, daily RFP. Will follow       Trudy Porter DO  Nephrology Fellow   Daytime / Weekend Renal Pager [phone]  After 7 pm Emergencies [phone] Pager [phone]

## 2024-09-15 NOTE — CARE PLAN
BRIEF PLAN:    Consultants     Pertinent DATA     TIMELINE  Date of MICU Admission:     CHECKLIST    01 Sedation: Precedex  02 Vent: yes   03 Vasoactives:   NE   Vasopressin   04 Nutrition: ***    05. RRT: nephrology following   06. ABx: Zosyn   07. Steroids: none   08. Diuretics: nephrology following, none   09. AC: yes   10. Lines/Drains:  ETT   HD Triple Lumen Right   CVC right femoral   Slater   NG/OG/Feeding tube   LUE: arterial line, PIV       11. Bowel Regimen:***   12. Code Status:***    TRANSITION of CARE ITEMS    ***    LEARNING POINTS FOR TEAM

## 2024-09-15 NOTE — PROCEDURES
Punch Biopsy Procedure Note     Pre-operative Diagnosis: vasculitis vs vasculopathy     Post-operative Diagnosis: same     Locations: Specimen A: L breast lateral  Specimen B: L breast medial     Indications: diagnostic uncertainty     Procedure Details:  Patient informed of the risks (including bleeding and infection) and benefits of the procedure and verbal informed consent obtained.     The lesions and surrounding area was given a sterile prep using alcohol and draped in the usual sterile fashion. The skin was then stretched perpendicular to the skin tension lines and the lesions removed using the 4mm punch. The resulting ellipse was then closed. The wound(s) were closed with 4-0 Ethilon using simple interrupted stitches. Vaseline ointment and gauze applied. The specimen was sent for pathologic examination. The patient tolerated the procedure well.     EBL: 0 ml      Plan:  1. For wound care, apply Vaseline daily to the areas for one week.  2. Suture removal in 10-12 days.           I was present for the entirety of the procedure(s).  I saw and evaluated the patient. I personally obtained the key and critical portions of the history and physical exam or was physically present for key and critical portions performed by the resident/fellow. I reviewed the resident/fellow's documentation and discussed the patient with the resident/fellow. I agree with the resident/fellow's medical decision making as documented in the note and made changes where appropriate.    Devi Ratliff MD

## 2024-09-16 ENCOUNTER — TELEPHONE (OUTPATIENT)
Dept: CARDIOLOGY | Facility: CLINIC | Age: 54
End: 2024-09-16

## 2024-09-16 ENCOUNTER — APPOINTMENT (OUTPATIENT)
Dept: CARDIOLOGY | Facility: HOSPITAL | Age: 54
DRG: 811 | End: 2024-09-16
Payer: COMMERCIAL

## 2024-09-16 ENCOUNTER — APPOINTMENT (OUTPATIENT)
Dept: RADIOLOGY | Facility: HOSPITAL | Age: 54
End: 2024-09-16
Payer: COMMERCIAL

## 2024-09-16 ENCOUNTER — APPOINTMENT (OUTPATIENT)
Dept: RADIOLOGY | Facility: HOSPITAL | Age: 54
DRG: 811 | End: 2024-09-16
Payer: COMMERCIAL

## 2024-09-16 LAB
ALBUMIN SERPL BCP-MCNC: 2.3 G/DL (ref 3.4–5)
ALBUMIN SERPL BCP-MCNC: 2.4 G/DL (ref 3.4–5)
ALP SERPL-CCNC: 208 U/L (ref 33–110)
ALP SERPL-CCNC: 216 U/L (ref 33–110)
ALT SERPL W P-5'-P-CCNC: 309 U/L (ref 7–45)
ALT SERPL W P-5'-P-CCNC: 397 U/L (ref 7–45)
ANION GAP BLDA CALCULATED.4IONS-SCNC: 10 MMO/L (ref 10–25)
ANION GAP SERPL CALC-SCNC: 17 MMOL/L (ref 10–20)
ANION GAP SERPL CALC-SCNC: 19 MMOL/L (ref 10–20)
APTT PPP: 61 SECONDS (ref 27–38)
APTT PPP: 61 SECONDS (ref 27–38)
AST SERPL W P-5'-P-CCNC: 57 U/L (ref 9–39)
AST SERPL W P-5'-P-CCNC: 73 U/L (ref 9–39)
ATRIAL RATE: 58 BPM
BACTERIA BLD CULT: NORMAL
BACTERIA BLD CULT: NORMAL
BACTERIA UR CULT: NO GROWTH
BASE EXCESS BLDA CALC-SCNC: -5.4 MMOL/L (ref -2–3)
BILIRUB DIRECT SERPL-MCNC: 12 MG/DL (ref 0–0.3)
BILIRUB DIRECT SERPL-MCNC: 9.6 MG/DL (ref 0–0.3)
BILIRUB SERPL-MCNC: 16.4 MG/DL (ref 0–1.2)
BILIRUB SERPL-MCNC: 16.4 MG/DL (ref 0–1.2)
BODY TEMPERATURE: 37 DEGREES CELSIUS
BUN SERPL-MCNC: 59 MG/DL (ref 6–23)
BUN SERPL-MCNC: 61 MG/DL (ref 6–23)
CA-I BLDA-SCNC: 1.05 MMOL/L (ref 1.1–1.33)
CALCIUM SERPL-MCNC: 7.5 MG/DL (ref 8.6–10.6)
CALCIUM SERPL-MCNC: 7.6 MG/DL (ref 8.6–10.6)
CHLORIDE BLDA-SCNC: 100 MMOL/L (ref 98–107)
CHLORIDE SERPL-SCNC: 94 MMOL/L (ref 98–107)
CHLORIDE SERPL-SCNC: 97 MMOL/L (ref 98–107)
CO2 SERPL-SCNC: 23 MMOL/L (ref 21–32)
CO2 SERPL-SCNC: 24 MMOL/L (ref 21–32)
CREAT SERPL-MCNC: 3.6 MG/DL (ref 0.5–1.05)
CREAT SERPL-MCNC: 3.98 MG/DL (ref 0.5–1.05)
EGFRCR SERPLBLD CKD-EPI 2021: 13 ML/MIN/1.73M*2
EGFRCR SERPLBLD CKD-EPI 2021: 14 ML/MIN/1.73M*2
ERYTHROCYTE [DISTWIDTH] IN BLOOD BY AUTOMATED COUNT: 21.5 % (ref 11.5–14.5)
ERYTHROCYTE [DISTWIDTH] IN BLOOD BY AUTOMATED COUNT: 21.9 % (ref 11.5–14.5)
GLUCOSE BLD MANUAL STRIP-MCNC: 104 MG/DL (ref 74–99)
GLUCOSE BLD MANUAL STRIP-MCNC: 105 MG/DL (ref 74–99)
GLUCOSE BLD MANUAL STRIP-MCNC: 111 MG/DL (ref 74–99)
GLUCOSE BLD MANUAL STRIP-MCNC: 87 MG/DL (ref 74–99)
GLUCOSE BLDA-MCNC: 88 MG/DL (ref 74–99)
GLUCOSE SERPL-MCNC: 104 MG/DL (ref 74–99)
GLUCOSE SERPL-MCNC: 81 MG/DL (ref 74–99)
HAPTOGLOB SERPL NEPH-MCNC: <30 MG/DL (ref 30–200)
HCO3 BLDA-SCNC: 20.7 MMOL/L (ref 22–26)
HCT VFR BLD AUTO: 23.7 % (ref 36–46)
HCT VFR BLD AUTO: 24.5 % (ref 36–46)
HCT VFR BLD EST: 26 % (ref 36–46)
HGB BLD-MCNC: 8.7 G/DL (ref 12–16)
HGB BLD-MCNC: 8.9 G/DL (ref 12–16)
HGB BLDA-MCNC: 8.6 G/DL (ref 12–16)
HGB RETIC QN: 34 PG (ref 28–38)
HOLD SPECIMEN: NORMAL
IMMATURE RETIC FRACTION: 31.4 %
INHALED O2 CONCENTRATION: 30 %
INR PPP: 1.6 (ref 0.9–1.1)
INR PPP: 1.7 (ref 0.9–1.1)
LACTATE BLDA-SCNC: 0.9 MMOL/L (ref 0.4–2)
LDH SERPL L TO P-CCNC: 697 U/L (ref 84–246)
MAGNESIUM SERPL-MCNC: 2.23 MG/DL (ref 1.6–2.4)
MAGNESIUM SERPL-MCNC: 2.32 MG/DL (ref 1.6–2.4)
MCH RBC QN AUTO: 30.1 PG (ref 26–34)
MCH RBC QN AUTO: 30.5 PG (ref 26–34)
MCHC RBC AUTO-ENTMCNC: 36.3 G/DL (ref 32–36)
MCHC RBC AUTO-ENTMCNC: 36.7 G/DL (ref 32–36)
MCV RBC AUTO: 82 FL (ref 80–100)
MCV RBC AUTO: 84 FL (ref 80–100)
NRBC BLD-RTO: 104.4 /100 WBCS (ref 0–0)
NRBC BLD-RTO: 77 /100 WBCS (ref 0–0)
OXYHGB MFR BLDA: 94.8 % (ref 94–98)
P AXIS: 60 DEGREES
P OFFSET: 191 MS
P ONSET: 134 MS
PCO2 BLDA: 42 MM HG (ref 38–42)
PH BLDA: 7.3 PH (ref 7.38–7.42)
PHOSPHATE SERPL-MCNC: 5.7 MG/DL (ref 2.5–4.9)
PLATELET # BLD AUTO: 73 X10*3/UL (ref 150–450)
PLATELET # BLD AUTO: 94 X10*3/UL (ref 150–450)
PO2 BLDA: 124 MM HG (ref 85–95)
POTASSIUM BLDA-SCNC: 3.5 MMOL/L (ref 3.5–5.3)
POTASSIUM SERPL-SCNC: 3.5 MMOL/L (ref 3.5–5.3)
POTASSIUM SERPL-SCNC: 3.8 MMOL/L (ref 3.5–5.3)
PR INTERVAL: 186 MS
PROT SERPL-MCNC: 4.8 G/DL (ref 6.4–8.2)
PROT SERPL-MCNC: 5 G/DL (ref 6.4–8.2)
PROTHROMBIN TIME: 18.4 SECONDS (ref 9.8–12.8)
PROTHROMBIN TIME: 18.7 SECONDS (ref 9.8–12.8)
Q ONSET: 227 MS
QRS COUNT: 9 BEATS
QRS DURATION: 88 MS
QT INTERVAL: 472 MS
QTC CALCULATION(BAZETT): 463 MS
QTC FREDERICIA: 466 MS
R AXIS: 58 DEGREES
RBC # BLD AUTO: 2.89 X10*6/UL (ref 4–5.2)
RBC # BLD AUTO: 2.92 X10*6/UL (ref 4–5.2)
RETICS #: 0.57 X10*6/UL (ref 0.02–0.08)
RETICS/RBC NFR AUTO: 19.5 % (ref 0.5–2)
SAO2 % BLDA: 98 % (ref 94–100)
SODIUM BLDA-SCNC: 127 MMOL/L (ref 136–145)
SODIUM SERPL-SCNC: 131 MMOL/L (ref 136–145)
SODIUM SERPL-SCNC: 135 MMOL/L (ref 136–145)
T AXIS: -46 DEGREES
T OFFSET: 463 MS
UFH PPP CHRO-ACNC: 0.2 IU/ML
UFH PPP CHRO-ACNC: 0.2 IU/ML
VENTRICULAR RATE: 58 BPM
WBC # BLD AUTO: 20.2 X10*3/UL (ref 4.4–11.3)
WBC # BLD AUTO: 21.5 X10*3/UL (ref 4.4–11.3)

## 2024-09-16 PROCEDURE — 2500000005 HC RX 250 GENERAL PHARMACY W/O HCPCS

## 2024-09-16 PROCEDURE — 82947 ASSAY GLUCOSE BLOOD QUANT: CPT

## 2024-09-16 PROCEDURE — 85027 COMPLETE CBC AUTOMATED: CPT

## 2024-09-16 PROCEDURE — 85610 PROTHROMBIN TIME: CPT

## 2024-09-16 PROCEDURE — 2500000001 HC RX 250 WO HCPCS SELF ADMINISTERED DRUGS (ALT 637 FOR MEDICARE OP)

## 2024-09-16 PROCEDURE — 71045 X-RAY EXAM CHEST 1 VIEW: CPT

## 2024-09-16 PROCEDURE — 82435 ASSAY OF BLOOD CHLORIDE: CPT

## 2024-09-16 PROCEDURE — 2500000004 HC RX 250 GENERAL PHARMACY W/ HCPCS (ALT 636 FOR OP/ED)

## 2024-09-16 PROCEDURE — 99221 1ST HOSP IP/OBS SF/LOW 40: CPT | Performed by: STUDENT IN AN ORGANIZED HEALTH CARE EDUCATION/TRAINING PROGRAM

## 2024-09-16 PROCEDURE — 86022 PLATELET ANTIBODIES: CPT | Performed by: STUDENT IN AN ORGANIZED HEALTH CARE EDUCATION/TRAINING PROGRAM

## 2024-09-16 PROCEDURE — 99232 SBSQ HOSP IP/OBS MODERATE 35: CPT

## 2024-09-16 PROCEDURE — 84132 ASSAY OF SERUM POTASSIUM: CPT

## 2024-09-16 PROCEDURE — 99291 CRITICAL CARE FIRST HOUR: CPT

## 2024-09-16 PROCEDURE — 74018 RADEX ABDOMEN 1 VIEW: CPT | Performed by: RADIOLOGY

## 2024-09-16 PROCEDURE — 99233 SBSQ HOSP IP/OBS HIGH 50: CPT

## 2024-09-16 PROCEDURE — 94003 VENT MGMT INPAT SUBQ DAY: CPT

## 2024-09-16 PROCEDURE — 71045 X-RAY EXAM CHEST 1 VIEW: CPT | Performed by: RADIOLOGY

## 2024-09-16 PROCEDURE — 83735 ASSAY OF MAGNESIUM: CPT

## 2024-09-16 PROCEDURE — 86038 ANTINUCLEAR ANTIBODIES: CPT

## 2024-09-16 PROCEDURE — 83615 LACTATE (LD) (LDH) ENZYME: CPT

## 2024-09-16 PROCEDURE — 85306 CLOT INHIBIT PROT S FREE: CPT

## 2024-09-16 PROCEDURE — 83010 ASSAY OF HAPTOGLOBIN QUANT: CPT

## 2024-09-16 PROCEDURE — 80053 COMPREHEN METABOLIC PANEL: CPT

## 2024-09-16 PROCEDURE — 82248 BILIRUBIN DIRECT: CPT

## 2024-09-16 PROCEDURE — 85045 AUTOMATED RETICULOCYTE COUNT: CPT

## 2024-09-16 PROCEDURE — 85303 CLOT INHIBIT PROT C ACTIVITY: CPT

## 2024-09-16 PROCEDURE — 82330 ASSAY OF CALCIUM: CPT

## 2024-09-16 PROCEDURE — 2020000001 HC ICU ROOM DAILY

## 2024-09-16 PROCEDURE — 2500000005 HC RX 250 GENERAL PHARMACY W/O HCPCS: Performed by: INTERNAL MEDICINE

## 2024-09-16 PROCEDURE — 85520 HEPARIN ASSAY: CPT

## 2024-09-16 PROCEDURE — 74018 RADEX ABDOMEN 1 VIEW: CPT

## 2024-09-16 PROCEDURE — 37799 UNLISTED PX VASCULAR SURGERY: CPT

## 2024-09-16 PROCEDURE — 93010 ELECTROCARDIOGRAM REPORT: CPT | Performed by: STUDENT IN AN ORGANIZED HEALTH CARE EDUCATION/TRAINING PROGRAM

## 2024-09-16 PROCEDURE — 86329 IMMUNODIFFUSION NES: CPT

## 2024-09-16 PROCEDURE — 93005 ELECTROCARDIOGRAM TRACING: CPT

## 2024-09-16 RX ORDER — POTASSIUM CHLORIDE 14.9 MG/ML
20 INJECTION INTRAVENOUS
Status: COMPLETED | OUTPATIENT
Start: 2024-09-16 | End: 2024-09-17

## 2024-09-16 RX ORDER — HYDROMORPHONE HYDROCHLORIDE 1 MG/ML
0.4 INJECTION, SOLUTION INTRAMUSCULAR; INTRAVENOUS; SUBCUTANEOUS EVERY 4 HOURS PRN
Status: DISCONTINUED | OUTPATIENT
Start: 2024-09-16 | End: 2024-09-16

## 2024-09-16 RX ORDER — BIVALIRUDIN 250 MG/5ML
.05-.49 INJECTION, POWDER, LYOPHILIZED, FOR SOLUTION INTRAVENOUS CONTINUOUS
Status: DISCONTINUED | OUTPATIENT
Start: 2024-09-16 | End: 2024-09-23

## 2024-09-16 RX ORDER — BIVALIRUDIN 250 MG/5ML
.05-.49 INJECTION, POWDER, LYOPHILIZED, FOR SOLUTION INTRAVENOUS CONTINUOUS
Status: CANCELLED | OUTPATIENT
Start: 2024-09-16

## 2024-09-16 RX ORDER — BISACODYL 10 MG/1
10 SUPPOSITORY RECTAL DAILY
Status: DISCONTINUED | OUTPATIENT
Start: 2024-09-16 | End: 2024-09-26

## 2024-09-16 RX ORDER — HYDROMORPHONE HYDROCHLORIDE 1 MG/ML
0.4 INJECTION, SOLUTION INTRAMUSCULAR; INTRAVENOUS; SUBCUTANEOUS
Status: DISCONTINUED | OUTPATIENT
Start: 2024-09-16 | End: 2024-09-17

## 2024-09-16 RX ORDER — ACETAMINOPHEN 10 MG/ML
1000 INJECTION, SOLUTION INTRAVENOUS EVERY 12 HOURS PRN
Status: DISCONTINUED | OUTPATIENT
Start: 2024-09-16 | End: 2024-09-17

## 2024-09-16 RX ORDER — DIPHENHYDRAMINE HYDROCHLORIDE 50 MG/ML
25 INJECTION INTRAMUSCULAR; INTRAVENOUS ONCE
Status: COMPLETED | OUTPATIENT
Start: 2024-09-16 | End: 2024-09-16

## 2024-09-16 RX ORDER — POTASSIUM CHLORIDE 14.9 MG/ML
20 INJECTION INTRAVENOUS ONCE
Status: COMPLETED | OUTPATIENT
Start: 2024-09-16 | End: 2024-09-16

## 2024-09-16 RX ORDER — PROPOFOL 10 MG/ML
5-50 INJECTION, EMULSION INTRAVENOUS CONTINUOUS
Status: DISCONTINUED | OUTPATIENT
Start: 2024-09-16 | End: 2024-09-16

## 2024-09-16 ASSESSMENT — PAIN DESCRIPTION - LOCATION: LOCATION: GENERALIZED

## 2024-09-16 ASSESSMENT — PAIN - FUNCTIONAL ASSESSMENT
PAIN_FUNCTIONAL_ASSESSMENT: CPOT (CRITICAL CARE PAIN OBSERVATION TOOL)
PAIN_FUNCTIONAL_ASSESSMENT: 0-10
PAIN_FUNCTIONAL_ASSESSMENT: CPOT (CRITICAL CARE PAIN OBSERVATION TOOL)
PAIN_FUNCTIONAL_ASSESSMENT: 0-10

## 2024-09-16 ASSESSMENT — PAIN SCALES - GENERAL
PAINLEVEL_OUTOF10: 0 - NO PAIN
PAINLEVEL_OUTOF10: 7
PAINLEVEL_OUTOF10: 7
PAINLEVEL_OUTOF10: 0 - NO PAIN
PAINLEVEL_OUTOF10: 7
PAINLEVEL_OUTOF10: 0 - NO PAIN
PAINLEVEL_OUTOF10: 0 - NO PAIN

## 2024-09-16 NOTE — PROGRESS NOTES
Speech-Language Pathology  Therapy Communication Note  Patient Name: Senait Narvaez  MRN: [id number]  Today's Date: 9/16/2024   Time: 1500    Discipline: Speech-Language Pathology    Reason: Attempt    Comment:  Orders received for clinical swallow evaluation. Pt recently extubated after ~6 days; will complete evaluation next date, 9/17. MD aware.
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
## 2024-09-16 NOTE — PROGRESS NOTES
Communication Note    Patient Name: Senait Narvaez  MRN: [id number]  Today's Date: 9/16/2024   Room: 17/17-A    Discipline: Physical Therapy      Missed Visit: Yes  Missed Visit Reason:  (PT evaluation reviewed, patient continues to require fluctuating and increased pressor support.  Will continue to monitor and follow up as medically appropriate.)      09/16/24 at 9:01 AM   Mahendra Cook, PT   Rehab Office: [phone]

## 2024-09-16 NOTE — PROGRESS NOTES
Name: Senait Narvaez  MRN: [id number]  Encounter Date: 9/16/2024  PCP: No Assigned PCP Generic Provider, MD  Heme-Onc: Dr. Hill     Reason for consult: Hx of SC disease, shock with multiorgan system failure, now s/p exchange transfusion   Attending Provider Amanda LU     Hematology/ Oncology Consult Daily Progress Note    Overnight Events   Extubated successfully. Endorsing general pain. Patient also has developed painful bullae (pictures uploaded to media section in chart) across her breasts, axilla, and thighs. S/p derm biopsy, results still pending.    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 Patient previously dependent on red cell exchange transfusion which was on hold per patient preference. Hx of avascular necrosis of R hip      From discussion with mother on 9/12-- She was previously having exchanges q4-6 weeks, but suffered extreme pain after exchanges and often required hospitalizations after them. She would feel better around 10 days after exchange. The repeated burden and pain of exchanges was becoming too much for her, so she decided to take a break, discussed with Dr. Whaley. She has not had an exchange since March. Mother reports she has had multiple ICU admissions requiring intubation, most recent she believes in 2017, cannot recall the one before that.    From 12/06/2023 Heme Onc note-   She was diagnosed with SVC syndrome, she had Bilateral Upper Extremity and Facial Swelling d/t partial filling defect within the SVC and a thrombus of the SVC complicated by bilateral Mediport catheters. Vasc med consulted, rec lifelong coumadin. She was on heparin drip bridge and coumadin was initiated on 1/16. INR 3.0 on 1/29/2019. She is status post Venogram, SVC balloon angioplasty and Right mediport removal (1/15/20) and status post placement of left IJ temporary apharesis catheter, SVC angiogram, Balloon angioplasty of SVC/left brachiocephalic vein, Removal of left sided Mediport catheter  "(1/21/20). She was given IV diuretics lasix 2/26-29 for UE edema 2/2 SVC syndrome with edema. Breast remain swollen recommended breast binder. SVC in Nov 2022 with stent placed.      H&P from 1/8/2018 refers to multiorgan failure x2 with hepatopathy (once in Dec 2017)    Review of Systems   Unable to obtain    Allergies   No Known Allergies    Medications     bisacodyl, 10 mg, Daily  diphenhydramine-zinc acetate, , TID  folic acid, 1 mg, Daily  hydrocortisone sodium succinate, 50 mg, q6h  oxyCODONE, 10 mg, q6h  pantoprazole, 40 mg, Daily  piperacillin-tazobactam, 2.25 g, q6h  polyethylene glycol, 17 g, BID  sennosides, 2 tablet, BID  thiamine, 100 mg, Daily      fentaNYL, Last Rate: 75 mcg/hr (09/16/24 0800)  heparin, Last Rate: 1,400 Units/hr (09/16/24 0800)  norepinephrine, Last Rate: 0.1 mcg/kg/min (09/16/24 1100)  propofol, Last Rate: Stopped (09/16/24 1045)  vasopressin, Last Rate: Stopped (09/16/24 1101)      albuterol, 2.5 mg, q6h PRN  alteplase, 2 mg, PRN  dextrose, 12.5 g, q15 min PRN  dextrose, 25 g, q15 min PRN  fentaNYL, 25 mcg, q10 min PRN  glucagon, 1 mg, q15 min PRN  glucagon, 1 mg, q15 min PRN  oxygen, , Continuous PRN - O2/gases        Physical Exam   Blood pressure 130/55, pulse 82, temperature 36.9 °C (98.4 °F), resp. rate 13, height 1.651 m (5' 5\"), weight 123 kg (270 lb 1 oz), SpO2 100%.    Gen: awake with minimal discomfort   HEENT: AT/NC, NGT with feculent return, dried blood in nares bilaterally, sceral jaundice  CV: RRR, on two pressors   Pulm: rhonchi bilaterally   Abd: soft, NT/ND, no organomegaly  Ext: no LE edema  Skin: warm and dry, dark purple retiform patches across bilateral breasts superiorly and inferiorly, upper lateral trunk and bilateral flanks    Labs     Lab Results   Component Value Date    GLUCOSE 81 09/16/2024    CALCIUM 7.5 (L) 09/16/2024     (L) 09/16/2024    K 3.8 09/16/2024    CO2 24 09/16/2024    CL 94 (L) 09/16/2024    BUN 61 (H) 09/16/2024    CREATININE 3.98 " (H) 09/16/2024       Lab Results   Component Value Date    WBC 20.2 (H) 09/16/2024    HGB 8.9 (L) 09/16/2024    HCT 24.5 (L) 09/16/2024    MCV 84 09/16/2024    PLT 73 (L) 09/16/2024       Lab Results   Component Value Date     (H) 09/16/2024    AST 73 (H) 09/16/2024    ALKPHOS 216 (H) 09/16/2024    BILITOT 16.4 (H) 09/16/2024      Latest Reference Range & Units Most Recent   POCT pH, Arterial 7.38 - 7.42 pH 7.44 (H)  9/11/24 16:31   POCT pCO2, Arterial 38 - 42 mm Hg 28 (L)  9/11/24 16:31   POCT pO2, Arterial 85 - 95 mm Hg 138 (H)  9/11/24 16:31   POCT SO2, Arterial 94 - 100 % 99  9/11/24 16:31   POCT Oxy Hemoglobin, Arterial 94.0 - 98.0 % 95.7  9/11/24 16:31   POCT Hematocrit Calculated, Arterial 36.0 - 46.0 % 33.0 (L)  9/11/24 16:31   POCT Sodium, Arterial 136 - 145 mmol/L 127 (L)  9/11/24 16:31   POCT Potassium, Arterial 3.5 - 5.3 mmol/L 5.7 (H)  9/11/24 16:31   POCT Chloride, Arterial 98 - 107 mmol/L 100  9/11/24 16:31   POCT Ionized Calcium, Arterial 1.10 - 1.33 mmol/L 1.04 (L)  9/11/24 16:31   POCT Glucose, Arterial 74 - 99 mg/dL 90  9/11/24 16:31   POCT Lactate, Arterial 0.4 - 2.0 mmol/L 3.9 (H)  9/11/24 16:31   POCT Base Excess, Arterial -2.0 - 3.0 mmol/L -4.1 (L)  9/11/24 16:31   POCT HCO3 Calculated, Arterial 22.0 - 26.0 mmol/L 19.0 (L)  9/11/24 16:31   POCT Hemoglobin, Arterial 12.0 - 16.0 g/dL 11.1 (L)  9/11/24 16:31   POCT Anion Gap, Arterial 10 - 25 mmo/L 14  9/11/24 16:31   (H): Data is abnormally high  (L): Data is abnormally low   Latest Reference Range & Units 09/12/24 04:41   WBC 4.4 - 11.3 x10*3/uL 21.6 (H)   nRBC 0.0 - 0.0 /100 WBCs 356.4 (H)   RBC 4.00 - 5.20 x10*6/uL 3.50 (L)   HEMOGLOBIN 12.0 - 16.0 g/dL 10.4 (L)   HEMATOCRIT 36.0 - 46.0 % 28.3 (L)   MCV 80 - 100 fL 81   MCH 26.0 - 34.0 pg 29.7   MCHC 32.0 - 36.0 g/dL 36.7 (H)   RED CELL DISTRIBUTION WIDTH 11.5 - 14.5 % 20.0 (H)   Platelets 150 - 450 x10*3/uL 79 (L)   Immature Granulocytes %, Automated 0.0 - 0.9 % 1.6 (H)   Immature  Granulocytes Absolute, Automated 0.00 - 0.70 x10*3/uL 0.34   Neutrophils %, Manual 40.0 - 80.0 % 34.7   Bands %, Manual 0.0 - 5.0 % 49.0   Lymphocytes %, Manual 13.0 - 44.0 % 0.0   Monocytes %, Manual 2.0 - 10.0 % 6.1   Eosinophils %, Manual 0.0 - 6.0 % 0.0   Basophils %, Manual 0.0 - 2.0 % 0.0   Metamyelocytes % 0.0 - 0.0 % 4.1   Myelocytes %, Manual 0.0 - 0.0 % 6.1   Seg Neutrophils Absolute, Manual 1.20 - 7.00 x10*3/uL 7.50 (H)   Bands Absolute, Manual 0.00 - 0.70 x10*3/uL 10.58 (H)   Lymphocytes Absolute, Manual 1.20 - 4.80 x10*3/uL 0.00 (L)   Monocytes Absolute, Manual 0.10 - 1.00 x10*3/uL 1.32 (H)   Eosinophils Absolute, Manual 0.00 - 0.70 x10*3/uL 0.00   Basophils Absolute, Manual 0.00 - 0.10 x10*3/uL 0.00   Metamyelocytes Absolute 0.00 - 0.00 x10*3/uL 0.89   Myelocytes Absolute 0.00 - 0.00 x10*3/uL 1.32   Total Cells Counted  49   Neutrophils Absolute, Manual 1.20 - 7.70 x10*3/uL 18.08 (H)   RBC Morphology  See Below   Polychromasia  Mild   RBC Fragments  Few   Sickle Cells  Few   Target Cells  Few   Teardrop Cells  Few   Sandra Cells  Few   Acanthocytes  Few   Basophilic Stippling  Present   (H): Data is abnormally high  (L): Data is abnormally low  Imaging   Reviewed     Assessment/Plan     Senait Narvaez is a 54 y.o. 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 Patient was transferred to [facility] and subsequently had hypotension requiring vasopressors and acidosis with inadequate respiratory compensation requiring intubation.      NORA is negative, and review of blood smear did not show schistocytes or other evidence of hemolysis. Hemolysis does not fit with clinical picture and there is no evidence that  patient has had hemolytic reactions previously. 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     Etiology for new onset hypoxia is unclear in this patient, as it could be an early or atypical presentation of acute chest syndrome, or pulmonary embolism. Infection also cannot be ruled out. Patient is severely thrombocytopenic, and warfarin has not been reversed. Primary team can consider reversal.     Patient is s/p exchange transfusion (6u pRBC)and 2u pRBC, which should help prevent further sickling and its sequelae. Hgb S is well below goal of 30% of total Hgb. We recommend CBCs, reticulocyte counts, LDH, and DIC panel daily.    9/15 Update: Continue supportive care. No indication for repeat exchange at this time. Continue heparin infusion. Low 4T score and low concern for heparin induced thrombocytopenia so no need to discontinue heparin. Agree with current vasculitis workup and recommend addition of  lupus anticoagulant to labs.    Patient seen and discussed with attending physician, Dr. Morillo, who agrees with the above.     Irene Jarrett, MS4  Hematology Consult Pager: [phone]  Oncology Consult Pager: [phone]

## 2024-09-16 NOTE — CARE PLAN
Problem: Skin  Goal: Decreased wound size/increased tissue granulation at next dressing change  Outcome: Progressing  Flowsheets (Taken 9/16/2024 1940)  Decreased wound size/increased tissue granulation at next dressing change: Protective dressings over bony prominences  Goal: Participates in plan/prevention/treatment measures  Outcome: Progressing  Flowsheets (Taken 9/16/2024 1940)  Participates in plan/prevention/treatment measures: Elevate heels  Goal: Prevent/manage excess moisture  Outcome: Progressing  Flowsheets (Taken 9/16/2024 1940)  Prevent/manage excess moisture: Monitor for/manage infection if present  Goal: Prevent/minimize sheer/friction injuries  Outcome: Progressing  Flowsheets (Taken 9/16/2024 1940)  Prevent/minimize sheer/friction injuries: Turn/reposition every 2 hours/use positioning/transfer devices  Goal: Promote/optimize nutrition  Outcome: Progressing  Flowsheets (Taken 9/16/2024 1940)  Promote/optimize nutrition: Monitor/record intake including meals  Goal: Promote skin healing  Outcome: Progressing  Flowsheets (Taken 9/16/2024 1940)  Promote skin healing: Turn/reposition every 2 hours/use positioning/transfer devices     Problem: Pain  Goal: Free from opioid side effects throughout the shift  Outcome: Progressing

## 2024-09-16 NOTE — PROGRESS NOTES
"Medical Intensive Care - Daily Progress Note   Subjective    Senait Narvaez is a 54 y.o. female patient admitted on 9/10/2024 with following ICU needs: hypotension requiring vasopressors and acidosis with inappropriate respiratory compensation requiring intubation    Interval History:  Overnight, patient had bowel movements with bowel regimen. Patient with generalized itching, given a one-time dose of IV Benadryl 25 mg. Patient also had coughing and dry heaving. ET and OG tubes repositioned. Patient was bolused with fentanyl 50 & 25 mcg. Noted persistent gagging, and Fentanyl ggt rate was increased to 100 mcg. Propofol was also started. Off Precedex.    Meds    Scheduled medications  bisacodyl, 10 mg, rectal, Daily  diphenhydramine-zinc acetate, , Topical, TID  folic acid, 1 mg, oral, Daily  hydrocortisone sodium succinate, 50 mg, intravenous, q6h  [Held by provider] oxyCODONE, 10 mg, oral, q6h  pantoprazole, 40 mg, intravenous, Daily  piperacillin-tazobactam, 2.25 g, intravenous, q6h  polyethylene glycol, 17 g, oral, BID  sennosides, 2 tablet, oral, BID  thiamine, 100 mg, intravenous, Daily      Continuous medications  fentaNYL, 0-300 mcg/hr, Last Rate: Stopped (09/16/24 1130)  heparin, 0-4,000 Units/hr, Last Rate: 1,600 Units/hr (09/16/24 1434)  norepinephrine, 0.01-1 mcg/kg/min, Last Rate: 0.12 mcg/kg/min (09/16/24 1230)  propofol, 5-50 mcg/kg/min, Last Rate: Stopped (09/16/24 1045)  vasopressin, 0.03 Units/min, Last Rate: Stopped (09/16/24 1101)      PRN medications  PRN medications: albuterol, alteplase, dextrose, dextrose, fentaNYL, glucagon, glucagon, HYDROmorphone, oxygen     Objective    Blood pressure 130/55, pulse 79, temperature 36.3 °C (97.3 °F), temperature source Temporal, resp. rate 16, height 1.651 m (5' 5\"), weight 123 kg (270 lb 1 oz), SpO2 100%.     Temp  Min: 36.2 °C (97.2 °F)  Max: 36.9 °C (98.4 °F)  Pulse  Min: 48  Max: 97  Resp  Min: 11  Max: 19  SpO2  Min: 87 %  Max: 100 %    Physical " Exam  Constitutional:       Appearance: She is ill-appearing.      Interventions: She is intubated.      Comments: Obese, RASS -1, able to follow commands   Cardiovascular:      Rate and Rhythm: Normal rate and regular rhythm.      Pulses: Normal pulses.      Heart sounds: Normal heart sounds.   Pulmonary:      Effort: She is intubated.      Comments: Rhonchorous breath sounds bilaterally, left worse than right  Abdominal:      General: Abdomen is flat. Bowel sounds are normal.      Palpations: Abdomen is soft.   Skin:     General: Skin is warm and dry.      Findings: Rash (Nonraised pigmented rash appearing circumferentially around bilateral areola on the breast. Rash also present in the groin.) present.          Intake/Output Summary (Last 24 hours) at 9/16/2024 1443  Last data filed at 9/16/2024 1321  Gross per 24 hour   Intake 1576.46 ml   Output 1975 ml   Net -398.54 ml     Net IO Since Admission: 5,559.54 mL [09/16/24 1443]     Labs:   Results from last 72 hours   Lab Units 09/16/24  0447 09/15/24  1716 09/15/24  0457   HEMOGLOBIN g/dL 8.9* 9.0* 9.2*   HEMATOCRIT % 24.5* 25.2* 25.9*   WBC AUTO x10*3/uL 20.2* 21.4* 22.7*   PLATELETS AUTO x10*3/uL 73* 64* 63*      Results from last 72 hours   Lab Units 09/16/24  0447 09/15/24  1716 09/15/24  0457   SODIUM mmol/L 131* 128* 130*   POTASSIUM mmol/L 3.8 3.8 4.2   CHLORIDE mmol/L 94* 92* 93*   CO2 mmol/L 24 24 21   BUN mg/dL 61* 63* 64*   CREATININE mg/dL 3.98* 4.40* 4.80*   GLUCOSE mg/dL 81 87 67*   CALCIUM mg/dL 7.5* 7.9* 7.2*   ANION GAP mmol/L 17 16 20   EGFR mL/min/1.73m*2 13* 11* 10*   PHOSPHORUS mg/dL  --  5.7*  --         Micro/ID:   Lab Results   Component Value Date    URINECULTURE No growth 09/15/2024    BLOODCULT No growth at 1 day 09/15/2024       Summary of key imaging results from the last 24 hours  CXR 9/16: Endotracheal tube terminates 6.2 cm from the todd. Right internal jugular central venous catheter terminates over the upper SVC. Mild pulmonary  edema, similar to the prior exam. Small left greater than right pleural effusions.    Assessment and Plan     Assessment: Senait Narvaez is a 54 y.o. 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 Patient critically ill with hypotension initially requiring 2 pressors, CHARLES, acute liver injury possibly 2/2 congestive hepatopathy, hemolytic anemia requiring transfusions, altered mentation with lethargy and not compensating appropriately respiratory wise for acidosis requiring intubation.     Mechanical Ventilation: 4-10 days  Sedation/Analgesia:  Fentanyl, Propofol, oxycodone  Restraints: Restraints indicated as alternative therapies have been attempted and have been ineffective. Restrain with soft wrist restraints and side rails up x4 until medical devices discontinued and/or patient able to participate with plan of care.      Summary for 09/16/24:  Continue aggressive bowel regimen with MiraLAX BID, senna BID, and additional enema today. Currently holding tube feeds pending potential extubation today.  Planning to wean pressors and sedation. Possible SBT and extubation today.  Making good urine in setting of CHARLES with down trending Cr.  Consulted dermatology 9/15 for purpura present over b/l breasts. Follow-up biopsy results, ANCA, and cryoglobulins.  Starting stress dose steroids hydrocortisone 50 q6h    Plan:  NEUROLOGY/PSYCH:  #AMS  ::Likely in setting of critical illness with contribution of hypoglycemia  ::Intubated, sedated  ::CTH negative  :: Worsening agitation and gagging overnight, restarted propofol and increased fentanyl drip  c/w fentanyl, weaning as  tolerated. Plan to wean propofol as well. Continue oxycodone 10 mg q6h PO.  thiamine 500mg TID, c/w thiamine 100mg daily  SAT today with SBT     #Cauda equina with saddle numbness  no acute interventions     CARDIOVASCULAR:  #Hypotension, septic vs hypovolemic vs cardiogenic shock  ::Mixed So2 68 demonstrates not entirely septic, may also be hypovolemic vs less likely cardiogenic shock given TTE findings  continue levo and vaso, wean to systolic  mmHg  S/p 2u pRBCs 9/10  S/p exchange transfusion 9/11 AM  Starting stress dose steroids hydrocortisone 50 q6h     #Elevated troponins, now downtrending  #EKG with ST depressions, likely Type II MI due to vaso-occlusive crisis and anemia  ::TTE 9/11: EF 60-65%, RV enlarged and reduced in function which appears stable when compared to last TTE (1/2024)  ::Troponin leak is likely due to cardiac demand vs supply mismatch in the setting of worsening anemia and vaso-occlusive crisis  cardiology consulted, now signed off without indication to treat as ACS  Will consider V/Q scan if respiratory status worsens with a strong suspicion for PE     #Hx SVT  -hold home metoprolol tartrate 12.5mg BID     PULMONARY:  #Intubated  #CAN  #pHTN (6/2023)  #Chronic 2L O2 at night use  ::On 2L O2 on arrival, denying dyspnea and cough  ::CT chest with signs of pulmonary edema  wean vent settings as tolerated  albuterol nebulizer for wheezing  hold home Lasix 20  SBT with potential extubation today     RENAL/GENITOURINARY:  #CHARLES on CKD II, now polyuric phase  #Severe hyperkalemia with previous peaked T waves on EKG, resolving  ::Pre-renal CHARLES likely iso hypotension  ::Baseline cre 0.8-1, now 5.09, downtrending  ::Now s/p IV contrast on 9/10 with CT scan  ::9/10 OSH UA: turbid, 2+ blood, 1+ protein, 250LE, negative nitrite, rare bacteria, few squamous  ::FeNa 1.2%, consistent with ATN  monitor BMP BID  Hyperkalemia cocktail as needed, consider adding Lokalema if potassium persistently  elevated  Nephrology consulted, no need for RRT at this time     #Hyponatremia  Improving, continue to monitor    #Mixed HAGMA/NAGMA with secondary respiratory alkalosis  #Elevated lactate (likely Type A)  trend ABG and lactate     #Hypocalcemia  Replete PRN     GASTROENTEROLOGY:  #Acute liver injury 2/2 hemodynamic instability versus sickle cell crisis  ::On presentation to  MICU ALT 2611, AST 4727, T bili 10.2, INR 3.0, continues to improve  ::CT with hepatic venous congestion, patient lacks gallbladder  ::Liver US with Doppler: Diffuse fatty infiltration, unremarkable doppler interrogation of the liver.  ::EBV IgG positive, IgM negative  ::CMV IgG positive, PCR negative  ::Acute hepatitis panel unremarkable  ::CK elevated (944) at admission  trend MELD labs  Hepatology consulted, now signed off    #Direct hyperbilirubinuria, worsening  Iso improving hemolysis labs, may be secondary to impaired bile secretion iso hepatobiliary injury.   Continue to monitor     #Ileus on KUB  #Constipation, recurrent severe  c/w sennosides BID, miralax BID, suppository  Plans to restart diet after extubation today pending swallow study    ENDOCRINOLOGY:  #Hypoglycemia  ::Likely iso critical illness  q4hr glucose checks with hypoglycemia protocol     HEMATOLOGY:  #Sickle cell crisis  #Hgb SC disease (previously on exchange transfusions q4-6 weeks, last transfusion before admission 3/1/24)  #Hemolytic anemia  ::LDH>12,000 (now downtrending), haptoglobin <30, fibrinogen 318, retic % 5.2 on presentation  ::Peripheral smear without helmet cells  ::transfusion medicine and hematology teams on board, appreciate recs  ::Exchange transfusion completed 9/11, no plans for additional transfusion at this time  daily LDH, retics  Pain management with oxycodone 10 mg q6h     #Hx recurrent DVT/PE (on lifelong Coumadin)  # hx of SVC syndrome  ::Per family, facial/neck appearance on presentation at her baseline  ::S/p FFP 9/11  Hx SVC stricture  s/p SVC angioplasty  B/l Upper Extremity and Facial Swelling d/t partial filling defect within the SVC and a thrombus of the SVC complicated by bilateral Mediport catheters. On lifelong anticoagulation, DOAC discouraged due to high risk of clotting.  hold home warfarin 5mg daily, trend INR. Goal 2-3.  INR stable, continue heparin gtt     MUSCULOSKELETAL/ SKIN:  #Fibromyalgia  #Raynaud's disease  #Muscle spasms  -no active interventions at this time  -holding home flexuril 10mg TID prn muscle spasms due to AMS    #Purpura  -Present over b/l breasts: non-erythematous, no purulence. Dermatology consulted. Differential diagnosis includes purpura secondary to infection versus coagulopathy versus vasculopathy versus small and/or medium vessel vasculitis versus calciphylaxis.  S/p skin punch biopsies x 2 9/15, awaiting results  Follow-up ANCA and cryoglobulin labs  Suture removal around 9/25-9/27     INFECTIOUS DISEASE:  #Leukocytosis, improving  #Concern for some component of sepsis  ::Unclear infectious source. No acute findings on CT C/A/P. Patient had denied urinary symptoms prior to intubation.  ::Procal elevated 7.09 previously  ::S/p 2L fluids  :: Blood cultures 9/10: Finalized no growth  :: Blood cultures 9/12: NGTD x 3 days  :: Blood cultures 9/15: NGTD x 1 day  :: Strep and Legionella Ag negative  :: MRSA swab negative  Discontinued vancomycin, continue zosyn (day 6 of 7)  Holding micafungin started by overnight team. Ordered fungal serologic marker workup.     MISC:  -holding home vitamin C, elderberry, loratidine 10mg, zofran 4mg, oxycodone 10mg q4h prn, MVI    ICU Check List              ICU Liberation: Intervention:   Assess, Prevent, Manage Pain CPOT - Fentanyl bolus PRN   Both SAT and SBT [x] SAT  [x] SBT 30-60 min [x] Extubate to NC  [] Extubate to NIV  [] Discuss Trach   Choice of analgesia and sedation RASS: 0/-1  Fentanyl, propofol     Delirium: Assess, prevent and manage CAM -     Early Mobility and  Exercise   [x] PT /OT consult   Family Engagement and Empowerment [x]Family updated []SW consult      FEN   Fluids: PRN  Electrolytes: PRN  Nutrition: Enteral feeding with NPO Nepro; 10 (10mls q6hrs until goal rate of 30mls/hr); 200 (cc); Water; Other (specify); Every 8 hours      Prophylaxis:  DVT ppx: SCDs, heparin gtt, holding home warfarin  GI ppx: PPI  Bowel care: MiraLAX BID, senna BID, suppository PRN     Hardware:  Catheter: Right internal jugular Trialysis (placed 9/10/24), right femoral CVC (placed 9/11/24)  Access: pIV, OG  Drains: Slater  Lines: L radial Acra (placed 9/10/24)     Social:  Code: Full Code   HPOA: Tati Salgado (mother) [phone]. Daughter Tiesha ([phone]) also involved with patient's care.  Disposition: MICU pending extubation     Salvador Rosario MD   09/16/24 at 2:43 PM     Disclaimer: Documentation completed with the information available at the time of input. The times in the chart may not be reflective of actual patient care times, interventions, or procedures. Documentation occurs after the physical care of the patient.

## 2024-09-17 LAB
ALBUMIN SERPL BCP-MCNC: 2.5 G/DL (ref 3.4–5)
ALBUMIN SERPL BCP-MCNC: 3 G/DL (ref 3.4–5)
ALP SERPL-CCNC: 195 U/L (ref 33–110)
ALP SERPL-CCNC: 241 U/L (ref 33–110)
ALT SERPL W P-5'-P-CCNC: 238 U/L (ref 7–45)
ALT SERPL W P-5'-P-CCNC: 298 U/L (ref 7–45)
ANION GAP SERPL CALC-SCNC: 14 MMOL/L (ref 10–20)
ANION GAP SERPL CALC-SCNC: 15 MMOL/L (ref 10–20)
APTT PPP: 62 SECONDS (ref 27–38)
AST SERPL W P-5'-P-CCNC: 51 U/L (ref 9–39)
AST SERPL W P-5'-P-CCNC: 51 U/L (ref 9–39)
ATRIAL RATE: 101 BPM
ATRIAL RATE: 80 BPM
ATRIAL RATE: 86 BPM
ATRIAL RATE: 86 BPM
BACTERIA SPEC RESP CULT: NORMAL
BILIRUB DIRECT SERPL-MCNC: 6.4 MG/DL (ref 0–0.3)
BILIRUB DIRECT SERPL-MCNC: 9.3 MG/DL (ref 0–0.3)
BILIRUB SERPL-MCNC: 10.9 MG/DL (ref 0–1.2)
BILIRUB SERPL-MCNC: 14.8 MG/DL (ref 0–1.2)
BUN SERPL-MCNC: 56 MG/DL (ref 6–23)
BUN SERPL-MCNC: 57 MG/DL (ref 6–23)
CALCIUM SERPL-MCNC: 8 MG/DL (ref 8.6–10.6)
CALCIUM SERPL-MCNC: 8.3 MG/DL (ref 8.6–10.6)
CHLORIDE SERPL-SCNC: 98 MMOL/L (ref 98–107)
CHLORIDE SERPL-SCNC: 99 MMOL/L (ref 98–107)
CO2 SERPL-SCNC: 22 MMOL/L (ref 21–32)
CO2 SERPL-SCNC: 26 MMOL/L (ref 21–32)
CREAT SERPL-MCNC: 2.94 MG/DL (ref 0.5–1.05)
CREAT SERPL-MCNC: 3.1 MG/DL (ref 0.5–1.05)
EGFRCR SERPLBLD CKD-EPI 2021: 17 ML/MIN/1.73M*2
EGFRCR SERPLBLD CKD-EPI 2021: 18 ML/MIN/1.73M*2
ERYTHROCYTE [DISTWIDTH] IN BLOOD BY AUTOMATED COUNT: 21.6 % (ref 11.5–14.5)
ERYTHROCYTE [DISTWIDTH] IN BLOOD BY AUTOMATED COUNT: 22.5 % (ref 11.5–14.5)
FUNGITELL BETA-D GLUCAN,SERUM: <31 PG/ML
GLUCOSE BLD MANUAL STRIP-MCNC: 100 MG/DL (ref 74–99)
GLUCOSE BLD MANUAL STRIP-MCNC: 113 MG/DL (ref 74–99)
GLUCOSE BLD MANUAL STRIP-MCNC: 114 MG/DL (ref 74–99)
GLUCOSE BLD MANUAL STRIP-MCNC: 158 MG/DL (ref 74–99)
GLUCOSE SERPL-MCNC: 110 MG/DL (ref 74–99)
GLUCOSE SERPL-MCNC: 141 MG/DL (ref 74–99)
GRAM STN SPEC: NORMAL
GRAM STN SPEC: NORMAL
HAPTOGLOB SERPL NEPH-MCNC: <30 MG/DL (ref 30–200)
HCT VFR BLD AUTO: 23.3 % (ref 36–46)
HCT VFR BLD AUTO: 29.4 % (ref 36–46)
HGB BLD-MCNC: 10 G/DL (ref 12–16)
HGB BLD-MCNC: 8.5 G/DL (ref 12–16)
HGB RETIC QN: 35 PG (ref 28–38)
IMMATURE RETIC FRACTION: 31 %
INR PPP: 2.2 (ref 0.9–1.1)
LDH SERPL L TO P-CCNC: 604 U/L (ref 84–246)
MAGNESIUM SERPL-MCNC: 2.27 MG/DL (ref 1.6–2.4)
MAGNESIUM SERPL-MCNC: 2.44 MG/DL (ref 1.6–2.4)
MCH RBC QN AUTO: 30.2 PG (ref 26–34)
MCH RBC QN AUTO: 31 PG (ref 26–34)
MCHC RBC AUTO-ENTMCNC: 34 G/DL (ref 32–36)
MCHC RBC AUTO-ENTMCNC: 36.5 G/DL (ref 32–36)
MCV RBC AUTO: 83 FL (ref 80–100)
MCV RBC AUTO: 91 FL (ref 80–100)
NRBC BLD-RTO: 2.7 /100 WBCS (ref 0–0)
NRBC BLD-RTO: 63.4 /100 WBCS (ref 0–0)
P AXIS: 30 DEGREES
P AXIS: 66 DEGREES
P AXIS: 67 DEGREES
P AXIS: 74 DEGREES
P OFFSET: 180 MS
P OFFSET: 185 MS
P OFFSET: 189 MS
P OFFSET: 190 MS
P ONSET: 132 MS
P ONSET: 137 MS
P ONSET: 140 MS
P ONSET: 147 MS
PLATELET # BLD AUTO: 108 X10*3/UL (ref 150–450)
PLATELET # BLD AUTO: 152 X10*3/UL (ref 150–450)
POTASSIUM SERPL-SCNC: 4.1 MMOL/L (ref 3.5–5.3)
POTASSIUM SERPL-SCNC: 4.1 MMOL/L (ref 3.5–5.3)
PR INTERVAL: 136 MS
PR INTERVAL: 148 MS
PR INTERVAL: 148 MS
PR INTERVAL: 152 MS
PROT C ACT/NOR PPP CHRO: 39 % ACTIVITY (ref 70–150)
PROT S ACT/NOR PPP: 83 % ACTIVITY (ref 64–150)
PROT SERPL-MCNC: 5.1 G/DL (ref 6.4–8.2)
PROT SERPL-MCNC: 6.1 G/DL (ref 6.4–8.2)
PROTHROMBIN TIME: 24.8 SECONDS (ref 9.8–12.8)
Q ONSET: 208 MS
Q ONSET: 211 MS
Q ONSET: 214 MS
Q ONSET: 215 MS
QRS COUNT: 13 BEATS
QRS COUNT: 14 BEATS
QRS COUNT: 15 BEATS
QRS COUNT: 16 BEATS
QRS DURATION: 82 MS
QRS DURATION: 88 MS
QT INTERVAL: 328 MS
QT INTERVAL: 356 MS
QT INTERVAL: 360 MS
QT INTERVAL: 394 MS
QTC CALCULATION(BAZETT): 425 MS
QTC CALCULATION(BAZETT): 426 MS
QTC CALCULATION(BAZETT): 430 MS
QTC CALCULATION(BAZETT): 454 MS
QTC FREDERICIA: 390 MS
QTC FREDERICIA: 401 MS
QTC FREDERICIA: 406 MS
QTC FREDERICIA: 434 MS
R AXIS: 123 DEGREES
R AXIS: 145 DEGREES
R AXIS: 147 DEGREES
R AXIS: 188 DEGREES
RBC # BLD AUTO: 2.81 X10*6/UL (ref 4–5.2)
RBC # BLD AUTO: 3.23 X10*6/UL (ref 4–5.2)
RETICS #: 0.55 X10*6/UL (ref 0.02–0.08)
RETICS/RBC NFR AUTO: 19.5 % (ref 0.5–2)
SCAN RESULT: NORMAL
SODIUM SERPL-SCNC: 132 MMOL/L (ref 136–145)
SODIUM SERPL-SCNC: 134 MMOL/L (ref 136–145)
T AXIS: 105 DEGREES
T AXIS: 106 DEGREES
T AXIS: 87 DEGREES
T AXIS: 99 DEGREES
T OFFSET: 379 MS
T OFFSET: 389 MS
T OFFSET: 394 MS
T OFFSET: 405 MS
VENTRICULAR RATE: 101 BPM
VENTRICULAR RATE: 80 BPM
VENTRICULAR RATE: 86 BPM
VENTRICULAR RATE: 86 BPM
WBC # BLD AUTO: 19.9 X10*3/UL (ref 4.4–11.3)
WBC # BLD AUTO: 21.6 X10*3/UL (ref 4.4–11.3)

## 2024-09-17 PROCEDURE — 2500000004 HC RX 250 GENERAL PHARMACY W/ HCPCS (ALT 636 FOR OP/ED)

## 2024-09-17 PROCEDURE — 83735 ASSAY OF MAGNESIUM: CPT

## 2024-09-17 PROCEDURE — 83615 LACTATE (LD) (LDH) ENZYME: CPT

## 2024-09-17 PROCEDURE — 97129 THER IVNTJ 1ST 15 MIN: CPT | Mod: GO

## 2024-09-17 PROCEDURE — 85045 AUTOMATED RETICULOCYTE COUNT: CPT

## 2024-09-17 PROCEDURE — 84075 ASSAY ALKALINE PHOSPHATASE: CPT

## 2024-09-17 PROCEDURE — 1170000001 HC PRIVATE ONCOLOGY ROOM DAILY

## 2024-09-17 PROCEDURE — 85610 PROTHROMBIN TIME: CPT

## 2024-09-17 PROCEDURE — 99232 SBSQ HOSP IP/OBS MODERATE 35: CPT

## 2024-09-17 PROCEDURE — 97166 OT EVAL MOD COMPLEX 45 MIN: CPT | Mod: GO

## 2024-09-17 PROCEDURE — 99291 CRITICAL CARE FIRST HOUR: CPT

## 2024-09-17 PROCEDURE — 83010 ASSAY OF HAPTOGLOBIN QUANT: CPT

## 2024-09-17 PROCEDURE — 97161 PT EVAL LOW COMPLEX 20 MIN: CPT | Mod: GP | Performed by: STUDENT IN AN ORGANIZED HEALTH CARE EDUCATION/TRAINING PROGRAM

## 2024-09-17 PROCEDURE — 82947 ASSAY GLUCOSE BLOOD QUANT: CPT

## 2024-09-17 PROCEDURE — 37799 UNLISTED PX VASCULAR SURGERY: CPT

## 2024-09-17 PROCEDURE — 2500000001 HC RX 250 WO HCPCS SELF ADMINISTERED DRUGS (ALT 637 FOR MEDICARE OP)

## 2024-09-17 PROCEDURE — 82248 BILIRUBIN DIRECT: CPT

## 2024-09-17 PROCEDURE — 36415 COLL VENOUS BLD VENIPUNCTURE: CPT

## 2024-09-17 PROCEDURE — 97530 THERAPEUTIC ACTIVITIES: CPT | Mod: GP | Performed by: STUDENT IN AN ORGANIZED HEALTH CARE EDUCATION/TRAINING PROGRAM

## 2024-09-17 PROCEDURE — 85027 COMPLETE CBC AUTOMATED: CPT

## 2024-09-17 RX ORDER — ACETAMINOPHEN 325 MG/1
975 TABLET ORAL EVERY 12 HOURS
Status: DISCONTINUED | OUTPATIENT
Start: 2024-09-17 | End: 2024-09-21

## 2024-09-17 RX ORDER — HYDROMORPHONE HYDROCHLORIDE 1 MG/ML
0.6 INJECTION, SOLUTION INTRAMUSCULAR; INTRAVENOUS; SUBCUTANEOUS
Status: DISCONTINUED | OUTPATIENT
Start: 2024-09-17 | End: 2024-09-25

## 2024-09-17 RX ORDER — METOPROLOL TARTRATE 25 MG/1
12.5 TABLET, FILM COATED ORAL 2 TIMES DAILY
Status: DISPENSED | OUTPATIENT
Start: 2024-09-17

## 2024-09-17 RX ORDER — HYDROMORPHONE HYDROCHLORIDE 1 MG/ML
0.4 INJECTION, SOLUTION INTRAMUSCULAR; INTRAVENOUS; SUBCUTANEOUS
Status: DISCONTINUED | OUTPATIENT
Start: 2024-09-17 | End: 2024-09-17

## 2024-09-17 RX ORDER — OXYCODONE HYDROCHLORIDE 10 MG/1
10 TABLET ORAL EVERY 4 HOURS PRN
Status: DISCONTINUED | OUTPATIENT
Start: 2024-09-17 | End: 2024-09-25

## 2024-09-17 RX ORDER — ACETAMINOPHEN 325 MG/1
975 TABLET ORAL EVERY 8 HOURS
Status: DISCONTINUED | OUTPATIENT
Start: 2024-09-17 | End: 2024-09-17

## 2024-09-17 RX ORDER — ONDANSETRON 4 MG/1
4 TABLET, FILM COATED ORAL EVERY 8 HOURS PRN
Status: DISPENSED | OUTPATIENT
Start: 2024-09-17

## 2024-09-17 ASSESSMENT — PAIN SCALES - GENERAL
PAINLEVEL_OUTOF10: 0 - NO PAIN
PAINLEVEL_OUTOF10: 7
PAINLEVEL_OUTOF10: 0 - NO PAIN
PAINLEVEL_OUTOF10: 7
PAINLEVEL_OUTOF10: 8
PAINLEVEL_OUTOF10: 0 - NO PAIN
PAINLEVEL_OUTOF10: 4
PAINLEVEL_OUTOF10: 0 - NO PAIN
PAINLEVEL_OUTOF10: 3

## 2024-09-17 ASSESSMENT — COGNITIVE AND FUNCTIONAL STATUS - GENERAL
MOVING TO AND FROM BED TO CHAIR: A LOT
TOILETING: A LOT
MOVING TO AND FROM BED TO CHAIR: A LOT
STANDING UP FROM CHAIR USING ARMS: A LOT
PERSONAL GROOMING: A LITTLE
MOVING FROM LYING ON BACK TO SITTING ON SIDE OF FLAT BED WITH BEDRAILS: A LITTLE
TOILETING: TOTAL
DRESSING REGULAR LOWER BODY CLOTHING: TOTAL
TURNING FROM BACK TO SIDE WHILE IN FLAT BAD: A LITTLE
HELP NEEDED FOR BATHING: A LOT
DRESSING REGULAR LOWER BODY CLOTHING: A LOT
WALKING IN HOSPITAL ROOM: TOTAL
MOBILITY SCORE: 10
STANDING UP FROM CHAIR USING ARMS: A LOT
WALKING IN HOSPITAL ROOM: A LOT
HELP NEEDED FOR BATHING: A LOT
MOVING FROM LYING ON BACK TO SITTING ON SIDE OF FLAT BED WITH BEDRAILS: A LOT
MOBILITY SCORE: 14
DAILY ACTIVITIY SCORE: 14
CLIMB 3 TO 5 STEPS WITH RAILING: TOTAL
CLIMB 3 TO 5 STEPS WITH RAILING: A LOT
DAILY ACTIVITIY SCORE: 16
TURNING FROM BACK TO SIDE WHILE IN FLAT BAD: A LOT
DRESSING REGULAR UPPER BODY CLOTHING: A LITTLE
PERSONAL GROOMING: A LITTLE
DRESSING REGULAR UPPER BODY CLOTHING: A LITTLE

## 2024-09-17 ASSESSMENT — PAIN - FUNCTIONAL ASSESSMENT
PAIN_FUNCTIONAL_ASSESSMENT: 0-10

## 2024-09-17 ASSESSMENT — PAIN DESCRIPTION - DESCRIPTORS: DESCRIPTORS: ACHING

## 2024-09-17 ASSESSMENT — ACTIVITIES OF DAILY LIVING (ADL): BATHING_ASSISTANCE: MODERATE

## 2024-09-17 NOTE — SIGNIFICANT EVENT
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 ED for diffuse pain and lethargy, then transferred to  MICU for hemodynamic instability. Patient was subsequently intubated due to worsening lethargic and lack of respiratory compensation from significant metabolic acidosis. Vancomycin and Zosyn started for empiric coverage, patient also started on pressors for BP support. Patient was  transfused 2 units pRBCs and then underwent RBCEx on 9/11. Cardiology consulted for c/f NSTEMI vs demand ischemia (ST depressions and elevated troponin), rec treat as ACS. Course further c/b severe CHARLES with peak sCr of 5.4, and acute liver injury with severely elevated liver enzymes (ALT 4119, AST >10k). Liver US only remarkable for fatty infiltration, viral hepatitis panel negative. CHARLES and liver injury improved with supportive management. Pruritic rash noted 9/14, Derm consulted and took multiple biopsies 9/15. Rash then began to worsen to involve the groin, lateral thigh, and scalp with numerous tense bullae. Discontinued heparin given concerns for potential HIT, started bivalirudin. Patient was extubated on 9/16/2024. Started stress dose steroids given continued pressor requirements, pressors Dc'd 9/17. Patient transferred to [facility] 9/17, DC pending improved pain, PT/OT and remainder of derm w/u.     #Purpuric rash  #Concerns for possible heparin-induced thrombocytopenia (HIT)  - around the bilateral breasts, scalp and groin, now worsening to include tense bullae  - Non-erythematous, no purulence. Dermatology consulted. Differential diagnosis includes purpura secondary to infection versus coagulopathy versus  vasculopathy versus small and/or medium vessel vasculitis versus calciphylaxis.  - Concerns for possible heparin-induced thrombocytopenia with concurrent skin findings. Discontinued heparin 9/16 and transitioned to bivalirudin  -4 Ts score 0  -Low Protein C activity  - Sent PF4 with reflex to [facility], awaiting results  - s/p skin punch biopsies x 2 9/15, awaiting results. Dermatology continuing to follow.  - Follow-up ANCA and cryoglobulin labs  - Plan for suture removal around 9/25-9/27    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  # ACS  - Previously managed through routine RBC exchanges q6 weeks, most recent RBCEx 3/1/24, per patient pausing on transfusions for time being  - OARRS reviewed, no aberrant behavior noted  - LDH>12,000 (now downtrending), haptoglobin <30, fibrinogen 318, retic % 5.2 on presentation, bili 10.7  - leukocytosis 24.8 on admission to MICU  - CXR (9/10) persistent atelectasis/scarring in the left lower lung  - CT CAP (9/11) Diffuse mosaic attenuation of the lung parenchyma, which can be seen in the setting of small airway disease, pulmonary edema or acute infection  - intubated upon arrival to MICU, now s/p extubation 9/16  - started on vanc and zosyn (finished both courses prior to floor transfer)  - Procal elevated 7.09 (9/11)  - Blood cultures 9/10, 9/12, 9/15 all NGTD  - Strep and Legionella Ag, MRSA nares negative  - trop peak 1431, cards rec treat as ACS (see below)  - S/p 2u pRBCs 9/10 on arrival to MICU  - S/p exchange transfusion 9/11 AM for ACS  - Care plan from 12/6/23- For mild to moderate pain: Dilaudid 0.5mg IVP q3h as needed, for moderate to severe pain Dilaudid 1- 1.5mg q3h PRN  - On arrival to [facility], started on 0.6mg dilaudid IVP q3h PRN severe pain   - bowel regimen for opioid induced constipation, zofran for opioid induced nausea, and benadrly for opioid induced pruritus; LBM 2/28  - c/w home Duloxetine 40mg daily, PO Zofran PRN, Folic Acid 1mg daily, and MVI daily    # ST depression with  tropinemia   # Hx SVT  - Elevated troponins on admission, peak 1431 and now downtrending   - EKG with ST depressions, likely Type II MI due to vaso-occlusive crisis and anemia  - TTE 9/11: EF 60-65%, RV enlarged and reduced in function which appears stable when compared to last TTE (1/2024)  - Troponin leak is likely due to cardiac demand vs supply mismatch in the setting of worsening anemia and vaso-occlusive crisis  - Cardiology consulted, now signed off with indication to treat as ACS  - lasix held in MICU 2/2 hypotension and CHARLES, can resume as able   - Follows with Cardiology outpt, will request FUV closer to DC    # CHARLES on CKD II, improving  - Baseline ~ SCr <2, peak 5.4 this admission  - likely mixed pre-renal/intra-renal, on CKD II, now polyuric phase  - FeNa 1.2%, consistent with ATN  - Severe hyperkalemia with previous peaked T waves on EKG, resolved  - Nephrology followed, now signed off, no need for HD  - s/p multiple K cocktails in MICU    # Acute liver injury, improving  # Direct hyperbilirubinuria, improving  - likely 2/2 hemodynamic instability versus sickle cell crisis  - On presentation to  MICU ALT 2611, AST 4727, T bili 10.2, INR 3.0, continues to improve  - CT with hepatic venous congestion, patient lacks gallbladder  - Liver US with Doppler: Diffuse fatty infiltration, unremarkable doppler interrogation of the liver  - EBV IgG positive, IgM negative  - CMV IgG positive, PCR negative  - Acute hepatitis panel unremarkable  - Hepatology now signed off     # pHTN   - 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 Yi and Dr. 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 On lifelong anticoagulation, DOAC discouraged due to high risk of clotting   - home Coumadin 5mg daily HELD this admission 2/2 unstable course  - transition back to coumadin once cleared by Heme  - Follows with Coumadin clinic outpatient  - Caution with fluids      # CAN  - cont home CPAP   - cont home Albuterol PRN     # H/O Cauda Equine syndrome  - Follows Dr. Delacruz as outpatient  - no current issues     # DISPO  - Full code- confirmed on admission  - NOK: Tati Salgado (mother) (#[phone])

## 2024-09-17 NOTE — PROGRESS NOTES
Speech-Language Pathology  Therapy Communication Note  Patient Name: Senait Narvaez  MRN: [id number]  Today's Date: 9/17/2024   Time: 845    Discipline: Speech-Language Pathology    Reason: Attempt    Comment:  Per MD/EMR, pt upgraded to liquid diet per nursing screen + no longer requesting formal SLP evaluation. Will sign off/complete order.     Please re-consult if w/ any further c/f oropharyngeal dysphagia. MD aware.

## 2024-09-17 NOTE — NURSING NOTE
15:20: Request for second PIV insertion received, Patient's bilateral forearms assessed with/without US, 2 attempts made, unsuccessful. RN is informed.

## 2024-09-17 NOTE — CARE PLAN
Problem: Skin  Goal: Decreased wound size/increased tissue granulation at next dressing change  9/17/2024 0707 by Darrel Pearson RN  Outcome: Progressing  Flowsheets (Taken 9/17/2024 0707)  Decreased wound size/increased tissue granulation at next dressing change: Protective dressings over bony prominences  9/16/2024 1940 by Darrel Pearson RN  Outcome: Progressing  Flowsheets (Taken 9/16/2024 1940)  Decreased wound size/increased tissue granulation at next dressing change: Protective dressings over bony prominences  Goal: Participates in plan/prevention/treatment measures  9/17/2024 0707 by Darrel Pearson RN  Outcome: Progressing  Flowsheets (Taken 9/17/2024 0707)  Participates in plan/prevention/treatment measures: Elevate heels  9/16/2024 1940 by Darrel Pearson RN  Outcome: Progressing  Flowsheets (Taken 9/16/2024 1940)  Participates in plan/prevention/treatment measures: Elevate heels  Goal: Prevent/manage excess moisture  9/17/2024 0707 by Darrel Pearson RN  Outcome: Progressing  Flowsheets (Taken 9/17/2024 0707)  Prevent/manage excess moisture: Monitor for/manage infection if present  9/16/2024 1940 by Darrel Pearson RN  Outcome: Progressing  Flowsheets (Taken 9/16/2024 1940)  Prevent/manage excess moisture: Monitor for/manage infection if present  Goal: Prevent/minimize sheer/friction injuries  9/17/2024 0707 by Darrel Pearson RN  Outcome: Progressing  Flowsheets (Taken 9/17/2024 0707)  Prevent/minimize sheer/friction injuries: Turn/reposition every 2 hours/use positioning/transfer devices  9/16/2024 1940 by Darrel Pearson RN  Outcome: Progressing  Flowsheets (Taken 9/16/2024 1940)  Prevent/minimize sheer/friction injuries: Turn/reposition every 2 hours/use positioning/transfer devices  Goal: Promote/optimize nutrition  9/17/2024 0707 by Darrel Pearson RN  Outcome: Progressing  Flowsheets (Taken 9/17/2024 0707)  Promote/optimize nutrition: Monitor/record intake including  meals  9/16/2024 1940 by Darrel Pearson RN  Outcome: Progressing  Flowsheets (Taken 9/16/2024 1940)  Promote/optimize nutrition: Monitor/record intake including meals  Goal: Promote skin healing  9/17/2024 0707 by Darrel Pearson RN  Outcome: Progressing  Flowsheets (Taken 9/17/2024 0707)  Promote skin healing: Turn/reposition every 2 hours/use positioning/transfer devices  9/16/2024 1940 by Darrel Pearson RN  Outcome: Progressing  Flowsheets (Taken 9/16/2024 1940)  Promote skin healing: Turn/reposition every 2 hours/use positioning/transfer devices     Problem: Pain  Goal: Free from opioid side effects throughout the shift  9/17/2024 0707 by Darrel Pearson RN  Outcome: Progressing  9/16/2024 1940 by Darrel Pearson RN  Outcome: Progressing

## 2024-09-17 NOTE — SIGNIFICANT EVENT
Floor Readiness Note       I, personally, evaluated Senait Narvaez prior to transfer to the floor, including reviewing all current laboratory and imaging studies. The patient remains appropriate for transfer to the floor. Bedside nurse and respiratory therapy are also in agreement of patient's readiness for the floor.     Brief summary:  Senait Narvaez is a 54 y.o. female who was admitted to the MICU on 9/10/2024 for sickle cell crisis. She has a past medical history significant for sickle cell disease SVC syndrome, recurrent DVT/PE. She has been treated with exchange transfusion (9/11), 2 units pRBCs, Zosyn (day 7 of 7), vasopressors (now weaned), and intubation (now on 5 L nasal cannula). Patient remains on bivalirudin after concerns for potential HIT given worsening purpuric rash.    Updated focused Physical Exam:  Physical Exam  Constitutional:       General: She is not in acute distress.     Appearance: She is obese.      Comments: Obese   HENT:      Head: Normocephalic and atraumatic.   Eyes:      General: Scleral icterus present.   Cardiovascular:      Rate and Rhythm: Normal rate and regular rhythm.      Pulses: Normal pulses.      Heart sounds: Normal heart sounds.   Pulmonary:      Breath sounds: Rales (diffusely throughout) present.      Comments: 5L NC  Abdominal:      General: Abdomen is flat. Bowel sounds are normal.      Palpations: Abdomen is soft.  Skin:     General: Skin is warm and dry.      Findings: Rash (Nonraised pigmented purpura with numerous tense bullae circumferentially on the bilateral breasts. Purpura also present over scalp.) present.   Neurological:      General: No focal deficit present.     Current Vital Signs:  Heart Rate: 75 (09/17/24 1300 : Darrel Pearson RN)  BP: (!) 100/48 (09/17/24 1300 : Darrel Pearson RN)  Temp: 36.4 °C (97.5 °F) (09/17/24 1100 : Kwadwo Shaffer)  Resp: (!) 9 (09/17/24 1300 : Darrel Pearson RN)  SpO2: 100 % (09/17/24 1300 : Darrel Pearson RN)    Relevant  updates since rounds:  None    Accepting team,  Britt , received verbal sign out and the Provider Care team/Attending has been updated. Bedside nurse will now call accepting nurse for report and patient will be transferred to [place] [id number].    Salvador Rosario MD

## 2024-09-17 NOTE — PROGRESS NOTES
SOCIAL WORK NOTE   -ICU Treatment Plan: SW met with team for interdisciplinary rounds. Patient improving, extubated and off pressors, pending floor   -Support System: daughter  -Planned Disposition: Home vs SNF  -Additional information:  Social work to follow.   VIRIDIANA Woodard, LISW-S ([id number])

## 2024-09-17 NOTE — PROGRESS NOTES
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 ED for diffuse pain.     Per patient's daughter, patient began feeling diffuse body pain lasting for about 1 week. The symptoms were consistent with previous sickle cell pain crises. Patient had received exchange transfusions in the past but were stopped in March 2024 after patient had worsening lethargy post transfusions. Patient then began to have worsening lethargy and aching chest pain on 9/10/2024. She was taken to [facility] ED and subsequently transferred to  MICU for further evaluation.    In the MICU, patient was hemodynamically unstable and given boluses of fluids while started on levo and vasopressin. Patient was subsequently intubated due to worsening lethargic and lack of respiratory compensation from significant metabolic acidosis. Vancomycin and Zosyn started. Patient was then transfused 2 units pRBCs.    Patient underwent exchange transfusion on 9/11. Cardiology consulted given concerns for NSTEMI versus demand ischemia with ST depressions on EKG and elevated troponins. TTE showing EF 60- 65% with RV enlargement and reduced function that appeared stable when compared to previous TTE. Cardiology recommended no further indication to treat as ACS. Patient had severe CHARLES with peak creatinine around 5.4. No hemodialysis was required per nephrology. Finally hepatology consulted given worsening LFTs in the setting of severe acute liver injury. Liver ultrasound with Doppler only remarkable for diffuse fatty infiltration. Remainder of viral hepatitis workup returned  negative. Both CHARLES and acute liver injury improved with monitoring and supportive management.    Patient began to have pruritic rash first noticed on the bilateral breasts on 9/14. Dermatology consulted, skin biopsies taken on 9/15. Rash then began to worsen to involve the groin, lateral thigh, and scalp with numerous tense bullae. Discontinued heparin given concerns for potential HIT, started bivalirudin. Patient was extubated on 9/16/2024. Started stress dose steroids given continued pressor requirements. Pressors were then weaned and discontinued on 9/17. Stress dose steroids discontinued as well. Patient stable and appropriate for transfer to the floor.    C: Code Status/DPOA Info/Goals of Care/ACP Note    Full Code  DPOA/Contact Number: Extended Emergency Contact Information  Primary Emergency Contact: MILLYCLARYDALJIT MARSHALL  Address: [address] [place] [id number] [facility]  Home Phone: [phone]  Mobile Phone: [phone]  Relation: Mother  Secondary Emergency Contact: Tiesha Marshall  Home Phone: [phone]  Relation: Parent     U: Unprescribing & Pertinent High-Risk Medications    Changes to home meds: Currently holding home Lasix, Flexeril, and warfarin. Resuming oxycodone 10 mg     Anticoagulation: Yes - Therapeutic bivalirudin    Antibiotics:   [] N/A - no current planned antimicrobioals  [x]  Zosyn: indication pneumonia/acute chest syndrome, completing 7-day course today 9/17    P: Pending Tests at the Time of Transfer   Skin punch biopsy 9/15       A: Active consultants, including Rehab:   [x]  Subspecialty Consultants: hematology  [x]  PT  [x]  OT  []  SLP  []  Wound Care    U: Uncertainty Measure/Diagnostic Pause:    Working diagnosis at the time of transfer: shock most likely septic iso sickle cell crisis     Diagnosis Degree of Certainty: 2. Some uncertainty about the clinical diagnosis.     S: Summary of Major Problems and To-Dos:   Senait Marshall is a 54 y.o.  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 Patient was found to be critically ill with hypotension initially requiring 2 pressors, acute chest syndrome requiring antibiotics, severe CHARLES, acute liver injury possibly 2/2 congestive hepatopathy, hemolytic anemia requiring transfusions, altered mentation with lethargy and not compensating appropriately respiratory wise for acidosis requiring intubation. Now extubated with pressors weaned.     NEUROLOGY/PSYCH:  #AMS, now resolved  ::Likely in setting of critical illness with contribution of hypoglycemia  ::CTH negative  thiamine 500mg TID, c/w thiamine 100mg daily    #Pain secondary to sickle cell disease  Analgesia with Oxycodone 10 mg q6h, Tylenol 975 q12h, Dilaudid 0.4 mg q3h PRN for breakthrough pain    #Cauda equina with saddle numbness  no acute interventions     CARDIOVASCULAR:  #Hypotension, septic vs hypovolemic vs cardiogenic shock, now resulved  ::Mixed So2 68 demonstrates not entirely septic, may also be hypovolemic vs less likely cardiogenic shock given TTE findings  S/p 2u pRBCs 9/10  S/p exchange transfusion 9/11 AM  Discontinue stress dose steroids 9/17     #Elevated troponins on admission, trended to peak  #EKG with ST depressions, likely Type II MI due to vaso-occlusive crisis and anemia  ::TTE 9/11: EF 60-65%, RV enlarged and reduced in function which appears stable when compared to last TTE (1/2024)  ::Troponin leak is likely due to cardiac demand vs supply mismatch in the setting of worsening anemia and vaso-occlusive crisis  cardiology consulted, now signed off without  indication to treat as ACS  Will consider V/Q scan if respiratory status worsens with a strong suspicion for PE     #Hx SVT  -hold home metoprolol tartrate 12.5mg BID     PULMONARY:  #S/p extubation 9/16  #Acute chest syndrome  #CAN  #pHTN (6/2023)  #Chronic 2L O2 at night use  ::On 2L O2 on arrival, denying dyspnea and cough  ::CT chest with signs of pulmonary edema  ::S/p extubation 9/16  albuterol nebulizer for wheezing  holding home Lasix 20     RENAL/GENITOURINARY:  #CHARLES, likely mixed pre-renal/intra-renal, on CKD II, now polyuric phase  #Severe hyperkalemia with previous peaked T waves on EKG, resolving  ::Pre-renal CHARLES likely iso hypotension  ::FeNa 1.2%, consistent with ATN  ::Baseline cre 0.8-1, continues to improve from peak around 5.4  monitor BMP BID  Hyperkalemia cocktail as needed, consider adding Lokalema if potassium persistently elevated  Nephrology signed off, no need for RRT at this time     GASTROENTEROLOGY:  #Acute liver injury 2/2 hemodynamic instability versus sickle cell crisis  #Concerns for rhabdomyolysis, resolved  ::On presentation to  MICU ALT 2611, AST 4727, T bili 10.2, INR 3.0, continues to improve  ::CT with hepatic venous congestion, patient lacks gallbladder  ::Liver US with Doppler: Diffuse fatty infiltration, unremarkable doppler interrogation of the liver.  ::EBV IgG positive, IgM negative  ::CMV IgG positive, PCR negative  ::Acute hepatitis panel unremarkable  ::CK elevated (944) at admission  trend MELD labs  Hepatology consulted, now signed off    #Direct hyperbilirubinuria, improving  Iso improving hemolysis labs, may be secondary to impaired bile secretion iso hepatobiliary injury.   Continue to monitor     #Ileus on KUB  #Constipation, recurrent severe  c/w sennosides BID, miralax BID, suppository  Tolerating clear liquid diet well, will advance to regular diet    ENDOCRINOLOGY:  #Hypoglycemia  ::Likely iso critical illness  q4hr glucose checks with hypoglycemia  protocol     HEMATOLOGY:  #Sickle cell crisis  #Hgb SC disease (previously on exchange transfusions q4-6 weeks, last transfusion before admission 3/1/24)  #Hemolytic anemia  ::LDH>12,000 (now downtrending), haptoglobin <30, fibrinogen 318, retic % 5.2 on presentation  ::Peripheral smear without helmet cells  ::transfusion medicine and hematology teams on board, appreciate recs  ::Exchange transfusion completed 9/11, no plans for additional transfusion at this time  daily LDH, retics  Pain management as above (see neuro section)     #Hx 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 On lifelong anticoagulation, DOAC discouraged due to high risk of clotting.  Holding home warfarin 5mg daily  INR stable, discontinued heparin ggt and started bivalirudin 9/16. See MSK/skin section below.     MUSCULOSKELETAL/ SKIN:  #Fibromyalgia  #Raynaud's disease  #Muscle spasms  -no active interventions at this time  -holding home flexuril 10mg TID prn muscle spasms due to AMS    #Purpura around the bilateral breasts, scalp and groin, now worsening to include tense bullae  #Concerns for possible heparin-induced thrombocytopenia (HIT)  -Non-erythematous, no purulence. Dermatology consulted. Differential diagnosis includes purpura secondary to infection versus coagulopathy versus vasculopathy versus small and/or medium vessel vasculitis versus calciphylaxis.  -Concerns for possible heparin-induced thrombocytopenia with concurrent skin findings. Discontinued heparin 9/16 and transitioned to bivalirudin  -4 Ts score 0  -Low Protein C activity  Sent PF4 with reflex to [facility], awaiting  results  S/p skin punch biopsies x 2 9/15, awaiting results. Dermatology continuing to follow.  Follow-up ANCA and cryoglobulin labs  Plan for suture removal around 9/25-9/27     INFECTIOUS DISEASE:  #Leukocytosis, improving  #Concern for some component of sepsis previously, now resolved  ::Unclear infectious source. Possibly acute chest syndrome. No acute findings on CT C/A/P. Patient had denied urinary symptoms prior to intubation.  ::Procal elevated 7.09 previously  ::S/p 2L fluids  :: Blood cultures 9/10: Finalized no growth  :: Blood cultures 9/12: NGTD x 3 days  :: Blood cultures 9/15: NGTD x 2 days  :: Strep and Legionella Ag negative  :: MRSA swab negative  Discontinued vancomycin, continue zosyn (day 7 of 7)  Follow-up fungal serologic marker workup     MISC:  -holding home vitamin C, elderberry, loratidine 10mg, zofran 4mg, oxycodone 10mg q4h prn, MVI    To-do list prior to transfer:  [x]None     E: Exam, including Lines/Drains/Airways & Data Review:   Physical Exam  Constitutional:       General: She is not in acute distress.     Appearance: She is obese.      Comments: Obese   HENT:      Head: Normocephalic and atraumatic.   Eyes:      General: Scleral icterus present.   Cardiovascular:      Rate and Rhythm: Normal rate and regular rhythm.      Pulses: Normal pulses.      Heart sounds: Normal heart sounds.   Pulmonary:      Breath sounds: Rales (diffusely throughout) present.      Comments: 5L NC  Abdominal:      General: Abdomen is flat. Bowel sounds are normal.      Palpations: Abdomen is soft.  Skin:     General: Skin is warm and dry.      Findings: Rash (Nonraised pigmented purpura with numerous tense bullae circumferentially on the bilateral breasts. Purpura also present over scalp.) present.   Neurological:      General: No focal deficit present.      Mental Status: She is alert and oriented to person, place, and time.     Difficult airway? No  Lines/drains assessed for removal? Yes, describe:  arterial lines & CVCs removed    Within 30 minutes of the patient physically leaving the floor, a Floor Readiness Note needs to be placed with updated vitals.

## 2024-09-17 NOTE — SIGNIFICANT EVENT
Nephrology entry     Renal function continues to improve  Can remove trialysis line  Nephrology will sign off.   Outpatient Nephrology follow up on discharge (Dr Jorge follows patient)  Please reach out via Epic Chat with questions    Millie Grace MD   Nephrology fellow   Nephrology pager [phone]  Available via Epic Chat      Clothing

## 2024-09-17 NOTE — PROGRESS NOTES
Occupational Therapy    Evaluation and Treatment    Patient Name: Senait Narvaez  MRN: [id number]  Today's Date: 9/17/2024  Room: 17/17  Time Calculation  Start Time: 1030  Stop Time: 1110  Time Calculation (min): 40 min    Assessment  IP OT Assessment  OT Assessment: Per communication with PT, patient requiring MOD A for sit<>stand and unable to progress to chair 2/2 pain. Recommend MOD intensity OT services when medically appropriate for discharge and continue acute care OT.  Prognosis: Good  Barriers to Discharge: Other (Comment) (medical acuity)  Evaluation/Treatment Tolerance: Patient limited by fatigue  End of Session Communication: Bedside nurse  End of Session Patient Position: Bed, 3 rail up, Alarm off, not on at start of session  Plan:  Inpatient Plan  Treatment Interventions: ADL retraining, Visual perceptual retraining, Functional transfer training, UE strengthening/ROM, Endurance training, Cognitive reorientation, Patient/family training, Neuromuscular reeducation, Fine motor coordination activities, UE splinting, Equipment evaluation/education, Compensatory technique education  OT Frequency: 3 times per week  OT Discharge Recommendations: Moderate intensity level of continued care (hopeful progression to LOW)  OT Recommended Transfer Status: Assist of 1  OT - OK to Discharge: Yes (when medically appropriate)  OT Assessment  OT Assessment Results: Decreased ADL status, Decreased endurance, Decreased functional mobility, Decreased cognition  Prognosis: Good  Barriers to Discharge: Other (Comment) (medical acuity)  Evaluation/Treatment Tolerance: Patient limited by fatigue  Strengths: Ability to acquire knowledge, Attitude of self, Premorbid level of function  Barriers to Participation: Comorbidities    Subjective   Current Problem:  1. Acute liver failure without hepatic coma (HHS-HCC)  Vascular US abdomen/pelvis duplex complete    Vascular US abdomen/pelvis duplex complete      2. Sickle cell crisis  (Multi)        3. Acute on chronic respiratory failure with hypoxia (Multi)  Transthoracic Echo (TTE) Limited    Transthoracic Echo (TTE) Limited      4. Troponin level elevated        5. NSTEMI (non-ST elevated myocardial infarction) (Multi)  Transthoracic Echo (TTE) Complete    Transthoracic Echo (TTE) Complete        General:  Reason for Referral: 55 y/o F presenting to OSH with diffuse pain 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   Precautions:  Hearing/Visual Limitations: appears WFL; pt had glasses in room to wear at all times; not wearing during session.  Medical Precautions: Fall precautions, Oxygen therapy device and L/min (on 5L NC)  Vital Signs:     09/17/24 1030 09/17/24 1110   Vital Signs   Vitals Session Pre OT Post OT   Heart Rate 81 73   Resp  --   --    SpO2 99 % 100 %   /59 136/54   MAP (mmHg) 85 80       Pain:  Pain Assessment  Pain Assessment: 0-10  0-10 (Numeric) Pain Score: 4  Pain Type: Chronic pain  Pain Location: Generalized  Pain Descriptors: Aching  Pain Frequency: Constant/continuous  Pain Onset: Ongoing  Pain Interventions: Distraction, Emotional support  Response to Interventions: tolerated OT in bed activity  Lines/Tubes/Drains:  Arterial Line 09/10/24 Left Radial (Active)   Number of days: 6       Urethral Catheter 16 Fr. (Active)   Number of days: 2      "  Hemodialysis Cath 09/10/24 Triple lumen Right (Active)   Number of days: 6         Objective   Cognition:  Orientation Level: Disoriented to time (initially reporting it was March 22,2024; after asking patient what month is the start of fall, pt reporting Sept and reporting she never said March. Ordering food, patient thought it was time for lunch; reoriented to time and demo good carryover.)  Attention:  (Distractible)  Insight: Mild  Processing Speed: Delayed        Love Agitation Sedation Scale  Love Agitation Sedation Scale (RASS): Alert and calm  Home Living:  Type of Home: Apartment  Lives With: Alone (reporting daughter could stay with her at d/c; daughter works. Patient boyfriend sometimes stays at the apt; boyfriend works.)  Home Adaptive Equipment: None  Home Layout: One level  Home Access: Elevator  Bathroom Shower/Tub: Tub/shower unit (with grab bars and shower chair)  Home Living Comments: 5th floor apt with elevator access; laundry facilities on 5th floor.   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  Hand Dominance: Right  Prior Function Comments: Pt reporting she does everything for herself. Pt. does not drive.    ADL:  Eating Assistance: Modified independent (Device)  Eating Deficit: Setup  Grooming Assistance: Stand by  Grooming Deficit: Supervision/safety, Setup  Bathing Assistance: Moderate  UE Dressing Assistance: Minimal  LE Dressing Assistance: Total  Toileting Assistance with Device: Total    Bed Mobility/Transfers: Bed Mobility/Transfers: Bed Mobility  Bed Mobility: No (Patient politely declined OOB activity.)    Vision: Vision - Basic Assessment  Current Vision: Wears glasses all the time     Strength:  Strength Comments: " BUEs at least 4-/5    Coordination:  Movements are Fluid and Coordinated: Yes   Hand Function:  Hand Function  Gross Grasp: Functional  Coordination: Functional  Extremities:   RUE   RUE : Within Functional Limits, LUE   LUE: Within Functional Limits,  , and        Outcome Measures: [facility] Daily Activity  Putting on and taking off regular lower body clothing: Total  Bathing (including washing, rinsing, drying): A lot  Putting on and taking off regular upper body clothing: A little  Toileting, which includes using toilet, bedpan or urinal: Total  Taking care of personal grooming such as brushing teeth: A little  Eating Meals: None  Daily Activity - Total Score: 14    Confusion Assessment Method-ICU (CAM-ICU)  Feature 3: Altered Level of Consciousness: Negative    ,   Love Agitation Sedation Scale  Love Agitation Sedation Scale (RASS): Alert and calm      Education Documentation  Handouts, taught by Kimberly Brewer OT at 9/17/2024 12:49 PM.  Learner: Patient  Readiness: Acceptance  Method: Explanation  Response: Verbalizes Understanding, Needs Reinforcement  Comment: safety parameters, orientation, role of OT    Body Mechanics, taught by Kimberly Brewer OT at 9/17/2024 12:49 PM.  Learner: Patient  Readiness: Acceptance  Method: Explanation  Response: Verbalizes Understanding, Needs Reinforcement  Comment: safety parameters, orientation, role of OT    Precautions, taught by Kimberly Brewer OT at 9/17/2024 12:49 PM.  Learner: Patient  Readiness: Acceptance  Method: Explanation  Response: Verbalizes Understanding, Needs Reinforcement  Comment: safety parameters, orientation, role of OT    ADL Training, taught by Kimberly Brewer OT at 9/17/2024 12:49 PM.  Learner: Patient  Readiness: Acceptance  Method: Explanation  Response: Verbalizes Understanding, Needs Reinforcement  Comment: safety parameters, orientation, role of OT    Education Comments  No comments found.        Goals:   Encounter Problems        Encounter Problems (Active)       ADLs       Patient will complete toileting with SBA and min cues.   (Progressing)       Start:  09/17/24    Expected End:  10/01/24            Patient will complete LB dressing with SBA and min cues.   (Progressing)       Start:  09/17/24    Expected End:  10/01/24            Patient will complete UB dressing with set up assist.   (Progressing)       Start:  09/17/24    Expected End:  10/01/24            Patient will complete grooming with set up assist.   (Progressing)       Start:  09/17/24    Expected End:  10/01/24                   BALANCE       Patient will demo standing balance for at least 5 min with SBA in prep for standing ADLs. (Progressing)       Start:  09/17/24    Expected End:  10/01/24               COGNITION/SAFETY       Patient will score WNL on cognitive assessment. (Progressing)       Start:  09/17/24    Expected End:  10/01/24               MOBILITY       Patient will complete bed mobility with SBA and min cues.    (Progressing)       Start:  09/17/24    Expected End:  10/01/24            Pt. Will demo short household distance functional mobility with SBA using LRAD.   (Progressing)       Start:  09/17/24    Expected End:  10/01/24               TRANSFERS       Pt. Will complete stand pivot transfer with SBA assist with min cues and using LRAD.   (Progressing)       Start:  09/17/24    Expected End:  10/01/24                   Treatment Completed on Evaluation  Cognitive Skill Development:  Cognitive Skill Development Activity 1: Reviewed orientation; updated white board.  Cognitive Skill Development Activity 2: Patient scoring 10/28 on Short Blessed Test indicating cognitive deficits.        09/17/24 at 12:50 PM   Kimberly Brewer OT   Rehab Office: [phone]

## 2024-09-17 NOTE — PROGRESS NOTES
Physical Therapy    Physical Therapy Evaluation    Patient Name: Senait Narvaez  MRN: [id number]  Room: 17/17-A  Today's Date: 9/17/2024   Time Calculation  Start Time: 0950  Stop Time: 1020  Time Calculation (min): 30 min    Assessment/Plan   PT Assessment  PT Assessment Results: Decreased endurance, Impaired balance, Decreased mobility, Pain  Rehab Prognosis: Good  Barriers to Discharge: medical acuity/pain control  End of Session Communication: Bedside nurse  End of Session Patient Position: Bed, 3 rail up, Alarm off, not on at start of session  IP OR SWING BED PT PLAN  Inpatient or Swing Bed: Inpatient  PT Plan  Treatment/Interventions: Gait training, Bed mobility, Transfer training, Balance training, Strengthening, Endurance training, Therapeutic exercise, Range of motion, Therapeutic activity  PT Plan: Ongoing PT  PT Frequency: 5 times per week  PT Discharge Recommendations: Low intensity level of continued care  Equipment Recommended upon Discharge:  (will continue to assess)  PT Recommended Transfer Status: Assist x1  PT - OK to Discharge: Yes  Reason for Referral: sickle cell crisis  Past Medical History Relevant to Rehab: PMHx of Hgb SC disease (previously on exchange transfusions q4-6 weeks, last transfusion 3/1/24), hx of SVC syndrome, recurrent DVT/PE (on lifelong Coumadin), pHTN (6/2023), cauda equina with saddle numbness, hx SVT, fibromyalgia, Raynaud's disease, CKD II (baseline SCr~<2) and CAN  Prior to Session Communication: Bedside nurse  Patient Position Received: Bed, 3 rail up, Alarm off, not on at start of session    Subjective      General Visit Information:  Subjective: Patient is alert, agreeable to PT.  Slow to respond for all activities.   Reason for Referral: sickle cell crisis  Past Medical History Relevant to Rehab: PMHx of Hgb SC disease (previously on exchange transfusions q4-6 weeks, last transfusion 3/1/24), hx of SVC syndrome, recurrent DVT/PE (on lifelong Coumadin), pHTN (6/2023),  cauda equina with saddle numbness, hx SVT, fibromyalgia, Raynaud's disease, CKD II (baseline SCr~<2) and CAN  Prior to Session Communication: Bedside nurse  Patient Position Received: Bed, 3 rail up, Alarm off, not on at start of session   Home Living:  Home Living  Type of Home: Apartment  Lives With: Alone (daughter can assist as needed)  Home Adaptive Equipment: None  Home Layout: One level  Home Access: Elevator  Prior Level of Function:  Prior Function Per Pt/Caregiver Report  Level of [place]: Independent with ADLs and functional transfers  Prior Function Comments: - drives  Precautions:  Precautions  Medical Precautions: Fall precautions, Oxygen therapy device and L/min  Vital Signs:  Pre Vitals  Vital Signs  Heart Rate: 70  SpO2: 100 %  BP: 128/70 (max 168/68 with activity)   Post Vitals  Vital Signs  Heart Rate: 78  SpO2: 100 %  BP: 122/72   Lines/Tubes/Drains:  Arterial Line 09/10/24 Left Radial (Active)   Number of days: 6       Urethral Catheter 16 Fr. (Active)   Number of days: 2       Hemodialysis Cath 09/10/24 Triple lumen Right (Active)   Number of days: 6     Medical Gas Therapy: Supplemental oxygen  Medical Gas Delivery Method: Nasal cannula  Continuous Medications/Drips:  bivalirudin, 0.05-0.49 mg/kg/hr, Last Rate: 0.05 mg/kg/hr (09/17/24 0800)        Objective   Pain:  Pain Assessment  0-10 (Numeric) Pain Score: 3 (8 c standing trials, 3 end of session)  Pain Location: Generalized  Pain Interventions: Repositioned    Cognition:  Cognition  Overall Cognitive Status: Within Functional Limits  Orientation Level: Oriented X4    General Assessments:  Extremity/Trunk Assessments:  Tone: No abnormalities noted  Sensation  Light Touch: No apparent deficits  Coordination  Movements are Fluid and Coordinated: Yes  Upper Extremity  ROM: WNL  Strength: BUE grossly 4/5  Lower Extremity  ROM: WFL  Strength: Grossly 4/5 limited by pain   Sitting Static Balance Normal  Sitting Dynamic Balance Not NormalUE  Support Two UE support and Assist Level Min Assist  Standing Static Balance Not NormalUE Support Two UE support and Assist Level Min Assist  Standing Dynamic Balance Not NormalUE Support Two UE support and Assist Level Min Assist    Functional Assessments:  Bed Mobility  Bed Mobility: Yes  Bed Mobility 1  Bed Mobility 1: Supine to sitting  Level of Assistance 1: Moderate assistance  Bed Mobility Comments 1: HOB 30, TAPS  Bed Mobility 2  Bed Mobility  2: Sitting to supine  Level of Assistance 2: Minimum assistance  Bed Mobility Comments 2: LE assist    Transfers  Transfer: Yes  Transfer 1  Transfer From 1: Sit to  Transfer to 1: Stand  Transfer Device 1:  (2 HHA)  Transfer Level of Assistance 1: Moderate assistance  Transfers 2  Transfer From 2: Stand to  Transfer to 2: Sit  Transfer Device 2:  (2 HHA)  Transfer Level of Assistance 2: Moderate assistance    Ambulation/Gait Training  Ambulation/Gait Training Performed: Yes  Ambulation/Gait Training 1  Surface 1: Level tile  Device 1:  (2 HHA)  Assistance 1: Minimum assistance  Quality of Gait 1:  (antalgic)  Comments/Distance (ft) 1: 3 steps, limited by increased pain c WB in thighs/legs              Outcome Measures:  [facility] Basic Mobility  Turning from your back to your side while in a flat bed without using bedrails: A lot  Moving from lying on your back to sitting on the side of a flat bed without using bedrails: A lot  Moving to and from bed to chair (including a wheelchair): A lot  Standing up from a chair using your arms (e.g. wheelchair or bedside chair): A lot  To walk in hospital room: Total  Climbing 3-5 steps with railing: Total  Basic Mobility - Total Score: 10           FSS-ICU  Ambulation: Unable to attempt due to weakness  Rolling: Moderate assistance (performs 50 - 74% of task)  Sitting: Minimal assistance (performs 75% or more of task)  Transfer Sit-to-Stand: Moderate assistance (performs 50 - 74% of task)  Transfer Supine-to-Sit: Moderate assistance  (performs 50 - 74% of task)  Total Score: 13  ICU Mobility Screen  ICU Mobility Scale: Standing             Encounter Problems       Encounter Problems (Active)       PT Problem       Patient will complete supine to sit and sit to supine Independent  (Progressing)       Start:  09/17/24    Expected End:  10/01/24            Patient will perform sit<>stand transfer with LRAD, and Modified Independent  (Progressing)       Start:  09/17/24    Expected End:  10/01/24            Patient will ambulate >150' with LRAD and Modified Independent  (Progressing)       Start:  09/17/24    Expected End:  10/01/24            Patient will complete Tinetti Balance Assesment with a score equal to or better than 18 to reduce falls risk.    (Progressing)       Start:  09/17/24    Expected End:  10/01/24               Pain - Adult          Treatment on evaluation  Sit-stand x 2 c 2 HHA mod a  Stand-sit x2 c 2 HHA mod A  Static stand 20s x 2 min A  BLE PF, DF, heel slide, isometric quad set 5 x 1 AROM    Assessment: Patient presents 2/2 sickle cell crisis.  Currently mod A for mobility with pain control and endurance main limiting factors.  Patient would benefit from further therapy to increase functional independence and safety.  Will continue to follow during acute stay.      Education Documentation  Precautions, taught by Mahendra Cook PT at 9/17/2024 10:56 AM.  Learner: Patient  Readiness: Acceptance  Method: Explanation  Response: Needs Reinforcement  Comment: mobility precautions    Body Mechanics, taught by Mahendra Cook PT at 9/17/2024 10:56 AM.  Learner: Patient  Readiness: Acceptance  Method: Explanation  Response: Needs Reinforcement  Comment: mobility precautions    Mobility Training, taught by Mahendra Cook PT at 9/17/2024 10:56 AM.  Learner: Patient  Readiness: Acceptance  Method: Explanation  Response: Needs Reinforcement  Comment: mobility precautions    Education Comments  No comments  found.          09/17/24 at 10:56 AM   Mahendra Cook, PT   Rehab Office: [phone]

## 2024-09-18 LAB
ALBUMIN SERPL BCP-MCNC: 3 G/DL (ref 3.4–5)
ALP SERPL-CCNC: 237 U/L (ref 33–110)
ALT SERPL W P-5'-P-CCNC: 247 U/L (ref 7–45)
ANCA AB PATTERN SER IF-IMP: NORMAL
ANCA IGG TITR SER IF: NORMAL {TITER}
ANION GAP SERPL CALC-SCNC: 17 MMOL/L (ref 10–20)
APTT PPP: 53 SECONDS (ref 27–38)
AST SERPL W P-5'-P-CCNC: 51 U/L (ref 9–39)
BILIRUB DIRECT SERPL-MCNC: 5.4 MG/DL (ref 0–0.3)
BILIRUB SERPL-MCNC: 9.1 MG/DL (ref 0–1.2)
BUN SERPL-MCNC: 57 MG/DL (ref 6–23)
CALCIUM SERPL-MCNC: 8.8 MG/DL (ref 8.6–10.6)
CHLORIDE SERPL-SCNC: 99 MMOL/L (ref 98–107)
CO2 SERPL-SCNC: 24 MMOL/L (ref 21–32)
COCCIDIOIDES AB TITR SER CF: ABNORMAL {TITER}
CREAT SERPL-MCNC: 2.79 MG/DL (ref 0.5–1.05)
EGFRCR SERPLBLD CKD-EPI 2021: 19 ML/MIN/1.73M*2
ERYTHROCYTE [DISTWIDTH] IN BLOOD BY AUTOMATED COUNT: 22.1 % (ref 11.5–14.5)
GLUCOSE SERPL-MCNC: 87 MG/DL (ref 74–99)
HAPTOGLOB SERPL NEPH-MCNC: <30 MG/DL (ref 30–200)
HCT VFR BLD AUTO: 29.3 % (ref 36–46)
HGB BLD-MCNC: 10 G/DL (ref 12–16)
HGB RETIC QN: 34 PG (ref 28–38)
IMMATURE RETIC FRACTION: 26.1 %
INR PPP: 1.8 (ref 0.9–1.1)
LDH SERPL L TO P-CCNC: 677 U/L (ref 84–246)
MAGNESIUM SERPL-MCNC: 2.54 MG/DL (ref 1.6–2.4)
MCH RBC QN AUTO: 30.3 PG (ref 26–34)
MCHC RBC AUTO-ENTMCNC: 34.1 G/DL (ref 32–36)
MCV RBC AUTO: 89 FL (ref 80–100)
MYELOPEROXIDASE AB SER-ACNC: 0 AU/ML (ref 0–19)
NRBC BLD-RTO: 3.5 /100 WBCS (ref 0–0)
PLATELET # BLD AUTO: 212 X10*3/UL (ref 150–450)
POTASSIUM SERPL-SCNC: 4.1 MMOL/L (ref 3.5–5.3)
PROT SERPL-MCNC: 6.3 G/DL (ref 6.4–8.2)
PROTEINASE3 AB SER-ACNC: 13 AU/ML (ref 0–19)
PROTHROMBIN TIME: 20.9 SECONDS (ref 9.8–12.8)
RBC # BLD AUTO: 3.3 X10*6/UL (ref 4–5.2)
RETICS #: 0.7 X10*6/UL (ref 0.02–0.08)
RETICS/RBC NFR AUTO: 21.2 % (ref 0.5–2)
SODIUM SERPL-SCNC: 136 MMOL/L (ref 136–145)
WBC # BLD AUTO: 18 X10*3/UL (ref 4.4–11.3)

## 2024-09-18 PROCEDURE — 0HB7XZX EXCISION OF ABDOMEN SKIN, EXTERNAL APPROACH, DIAGNOSTIC: ICD-10-PCS | Performed by: STUDENT IN AN ORGANIZED HEALTH CARE EDUCATION/TRAINING PROGRAM

## 2024-09-18 PROCEDURE — 2500000005 HC RX 250 GENERAL PHARMACY W/O HCPCS

## 2024-09-18 PROCEDURE — 99232 SBSQ HOSP IP/OBS MODERATE 35: CPT | Performed by: STUDENT IN AN ORGANIZED HEALTH CARE EDUCATION/TRAINING PROGRAM

## 2024-09-18 PROCEDURE — 85027 COMPLETE CBC AUTOMATED: CPT

## 2024-09-18 PROCEDURE — 80053 COMPREHEN METABOLIC PANEL: CPT

## 2024-09-18 PROCEDURE — 85613 RUSSELL VIPER VENOM DILUTED: CPT

## 2024-09-18 PROCEDURE — 88305 TISSUE EXAM BY PATHOLOGIST: CPT | Performed by: DERMATOLOGY

## 2024-09-18 PROCEDURE — 2500000001 HC RX 250 WO HCPCS SELF ADMINISTERED DRUGS (ALT 637 FOR MEDICARE OP)

## 2024-09-18 PROCEDURE — 2500000004 HC RX 250 GENERAL PHARMACY W/ HCPCS (ALT 636 FOR OP/ED)

## 2024-09-18 PROCEDURE — 85610 PROTHROMBIN TIME: CPT

## 2024-09-18 PROCEDURE — 11104 PUNCH BX SKIN SINGLE LESION: CPT | Performed by: STUDENT IN AN ORGANIZED HEALTH CARE EDUCATION/TRAINING PROGRAM

## 2024-09-18 PROCEDURE — 85045 AUTOMATED RETICULOCYTE COUNT: CPT

## 2024-09-18 PROCEDURE — 82248 BILIRUBIN DIRECT: CPT

## 2024-09-18 PROCEDURE — 2500000002 HC RX 250 W HCPCS SELF ADMINISTERED DRUGS (ALT 637 FOR MEDICARE OP, ALT 636 FOR OP/ED)

## 2024-09-18 PROCEDURE — 1170000001 HC PRIVATE ONCOLOGY ROOM DAILY

## 2024-09-18 PROCEDURE — 83735 ASSAY OF MAGNESIUM: CPT

## 2024-09-18 PROCEDURE — 83615 LACTATE (LD) (LDH) ENZYME: CPT

## 2024-09-18 PROCEDURE — 36415 COLL VENOUS BLD VENIPUNCTURE: CPT

## 2024-09-18 PROCEDURE — 97530 THERAPEUTIC ACTIVITIES: CPT | Mod: GP

## 2024-09-18 PROCEDURE — 83010 ASSAY OF HAPTOGLOBIN QUANT: CPT

## 2024-09-18 RX ORDER — WARFARIN SODIUM 5 MG/1
5 TABLET ORAL DAILY
Status: DISCONTINUED | OUTPATIENT
Start: 2024-09-18 | End: 2024-09-23

## 2024-09-18 ASSESSMENT — COGNITIVE AND FUNCTIONAL STATUS - GENERAL
WALKING IN HOSPITAL ROOM: TOTAL
TOILETING: A LOT
TURNING FROM BACK TO SIDE WHILE IN FLAT BAD: A LITTLE
PERSONAL GROOMING: A LITTLE
DAILY ACTIVITIY SCORE: 16
MOVING TO AND FROM BED TO CHAIR: A LOT
CLIMB 3 TO 5 STEPS WITH RAILING: A LOT
DRESSING REGULAR LOWER BODY CLOTHING: A LOT
HELP NEEDED FOR BATHING: A LOT
MOBILITY SCORE: 14
CLIMB 3 TO 5 STEPS WITH RAILING: TOTAL
MOVING TO AND FROM BED TO CHAIR: A LOT
MOVING FROM LYING ON BACK TO SITTING ON SIDE OF FLAT BED WITH BEDRAILS: A LITTLE
DRESSING REGULAR UPPER BODY CLOTHING: A LITTLE
MOVING FROM LYING ON BACK TO SITTING ON SIDE OF FLAT BED WITH BEDRAILS: A LITTLE
TURNING FROM BACK TO SIDE WHILE IN FLAT BAD: A LOT
MOBILITY SCORE: 11
STANDING UP FROM CHAIR USING ARMS: A LOT
STANDING UP FROM CHAIR USING ARMS: A LOT
WALKING IN HOSPITAL ROOM: A LOT

## 2024-09-18 ASSESSMENT — PAIN SCALES - GENERAL
PAINLEVEL_OUTOF10: 8
PAINLEVEL_OUTOF10: 6
PAINLEVEL_OUTOF10: 8
PAINLEVEL_OUTOF10: 9
PAINLEVEL_OUTOF10: 5 - MODERATE PAIN
PAINLEVEL_OUTOF10: 8
PAINLEVEL_OUTOF10: 6
PAINLEVEL_OUTOF10: 7
PAINLEVEL_OUTOF10: 4

## 2024-09-18 ASSESSMENT — PAIN - FUNCTIONAL ASSESSMENT
PAIN_FUNCTIONAL_ASSESSMENT: 0-10

## 2024-09-18 ASSESSMENT — PAIN DESCRIPTION - DESCRIPTORS: DESCRIPTORS: ACHING

## 2024-09-18 ASSESSMENT — PAIN DESCRIPTION - LOCATION: LOCATION: GENERALIZED

## 2024-09-18 NOTE — PROGRESS NOTES
Name: Senait Narvaez  MRN: [id number]  Encounter Date: 9/18/2024  PCP: No Assigned PCP Generic Provider, MD  Heme-Onc: Dr. Hill     Reason for consult: Hx of SC disease, shock with multiorgan system failure, now s/p exchange transfusion   Attending Provider Amanda LU     Hematology/ Oncology Consult Daily Progress Note    Overnight Events   Patient endorses pain from rash and biopsy sites. States rash is not enlarging. She also endorses fatigue.    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 Patient previously dependent on red cell exchange transfusion which was on hold per patient preference. Hx of avascular necrosis of R hip      From discussion with mother on 9/12-- She was previously having exchanges q4-6 weeks, but suffered extreme pain after exchanges and often required hospitalizations after them. She would feel better around 10 days after exchange. The repeated burden and pain of exchanges was becoming too much for her, so she decided to take a break, discussed with Dr. Whaley. She has not had an exchange since March. Mother reports she has had multiple ICU admissions requiring intubation, most recent she believes in 2017, cannot recall the one before that.    From 12/06/2023 Heme Onc note-   She was diagnosed with SVC syndrome, she had Bilateral Upper Extremity and Facial Swelling d/t partial filling defect within the SVC and a thrombus of the SVC complicated by bilateral Mediport catheters. Vasc med consulted, rec lifelong coumadin. She was on heparin drip bridge and coumadin was initiated on 1/16. INR 3.0 on 1/29/2019. She is status post Venogram, SVC balloon angioplasty and Right mediport removal (1/15/20) and status post placement of left IJ temporary apharesis catheter, SVC angiogram, Balloon angioplasty of SVC/left brachiocephalic vein, Removal of left sided Mediport catheter (1/21/20). She was given IV diuretics lasix 2/26-29 for UE edema 2/2 SVC syndrome with edema. Breast remain  "swollen recommended breast binder. [facility] in Nov 2022 with stent placed.      H&P from 1/8/2018 refers to multiorgan failure x2 with hepatopathy (once in Dec 2017)    Review of Systems   Negative unless listed in HPI    Allergies   No Known Allergies    Medications     acetaminophen, 975 mg, q12h  bisacodyl, 10 mg, Daily  diphenhydramine-zinc acetate, , TID  folic acid, 1 mg, Daily  metoprolol tartrate, 12.5 mg, BID  pantoprazole, 40 mg, Daily  polyethylene glycol, 17 g, BID  sennosides, 2 tablet, BID  thiamine, 100 mg, Daily      bivalirudin, Last Rate: 0.05 mg/kg/hr (09/17/24 1902)      albuterol, 2.5 mg, q6h PRN  alteplase, 2 mg, PRN  dextrose, 12.5 g, q15 min PRN  dextrose, 25 g, q15 min PRN  glucagon, 1 mg, q15 min PRN  glucagon, 1 mg, q15 min PRN  HYDROmorphone, 0.6 mg, q3h PRN  ondansetron, 4 mg, q8h PRN  oxyCODONE, 10 mg, q4h PRN  oxygen, , Continuous PRN - O2/gases        Physical Exam   Blood pressure 113/69, pulse 67, temperature 36 °C (96.8 °F), temperature source Temporal, resp. rate 16, height 1.651 m (5' 5\"), weight 120 kg (264 lb 12.4 oz), SpO2 100%.    Gen: awake with minimal discomfort   HEENT: AT/NC  Pulm: no respiratory distress  Skin: warm and dry, dark purple retiform patches across bilateral breasts superiorly and inferiorly, upper lateral trunk and bilateral flanks    Labs      Latest Reference Range & Units 09/18/24 08:09   GLUCOSE 74 - 99 mg/dL 87   SODIUM 136 - 145 mmol/L 136   POTASSIUM 3.5 - 5.3 mmol/L 4.1   CHLORIDE 98 - 107 mmol/L 99   Bicarbonate 21 - 32 mmol/L 24   Anion Gap 10 - 20 mmol/L 17   Blood Urea Nitrogen 6 - 23 mg/dL 57 (H)   Creatinine 0.50 - 1.05 mg/dL 2.79 (H)   EGFR >60 mL/min/1.73m*2 19 (L)   Calcium 8.6 - 10.6 mg/dL 8.8   Albumin 3.4 - 5.0 g/dL 3.0 (L)   Alkaline Phosphatase 33 - 110 U/L 237 (H)   ALT 7 - 45 U/L 247 (H)   AST 9 - 39 U/L 51 (H)   Bilirubin Total 0.0 - 1.2 mg/dL 9.1 (H)   Bilirubin, Direct 0.0 - 0.3 mg/dL 5.4 (H)   Haptoglobin 30 - 200 mg/dL <30 (L) "   Total Protein 6.4 - 8.2 g/dL 6.3 (L)   MAGNESIUM 1.60 - 2.40 mg/dL 2.54 (H)   LDH 84 - 246 U/L 677 (H)   (H): Data is abnormally high  (L): Data is abnormally low   Latest Reference Range & Units 09/18/24 08:09   WBC 4.4 - 11.3 x10*3/uL 18.0 (H)   nRBC 0.0 - 0.0 /100 WBCs 3.5 (H)   RBC 4.00 - 5.20 x10*6/uL 3.30 (L)   HEMOGLOBIN 12.0 - 16.0 g/dL 10.0 (L)   HEMATOCRIT 36.0 - 46.0 % 29.3 (L)   MCV 80 - 100 fL 89   MCH 26.0 - 34.0 pg 30.3   MCHC 32.0 - 36.0 g/dL 34.1   RED CELL DISTRIBUTION WIDTH 11.5 - 14.5 % 22.1 (H)   Platelets 150 - 450 x10*3/uL 212   (H): Data is abnormally high  (L): Data is abnormally low     Latest Reference Range & Units 09/18/24 08:09   Retic Absolute 0.018 - 0.083 x10*6/uL 0.700 (H)   Retic % 0.5 - 2.0 % 21.2 (H)   Reticulocyte Hemoglobin 28 - 38 pg 34   (H): Data is abnormally high  Imaging   Reviewed     Assessment/Plan     Senait Narvaez is a 54 y.o. 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 Patient was transferred to [facility] MICU and subsequently had hypotension requiring vasopressors and acidosis with inadequate respiratory compensation requiring intubation.      NORA is negative, and review of blood smear did not show schistocytes or other evidence of hemolysis. Hemolysis does not fit with clinical picture and there is no evidence that patient has had hemolytic reactions previously. 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     Etiology for new onset hypoxia is unclear in this patient, as it could be an early or atypical presentation of acute chest syndrome, or pulmonary embolism. Infection also cannot be ruled out. Patient  is severely thrombocytopenic, and warfarin has not been reversed. Primary team can consider reversal.     Patient is s/p exchange transfusion (6u pRBC)and 2u pRBC, which should help prevent further sickling and its sequelae. Hgb S is well below goal of 30% of total Hgb. We recommend CBCs, reticulocyte counts, LDH, and DIC panel daily.    9/18 Update: Continue supportive care. No indication for repeat exchange at this time.  PF4 assay negative, so low concern for HIT. When primary team feels it is appropriate, team can bridge patient from bivalrudin to warfarin, or switch bivalrudin to heparin and then bridge.    Patient seen and discussed with attending physician, Dr. Morillo, who agrees with the above.     Irene Jarrett, MS4  Hematology Consult Pager: [phone]  Oncology Consult Pager: [phone]

## 2024-09-18 NOTE — PROGRESS NOTES
"Senait Narvaez is a 54 y.o. female on day 8 of admission presenting with No Principal Problem: There is no principal problem currently on the Problem List. Please update the Problem List and refresh..    Subjective   Seen at bedside, sleeping. Pain is a little better but rash is very uncomfortable, waiting on biopsies. Denies N/V/D/C, chest pain, cough, shortness of breath.         Objective     Physical Exam  Constitutional:       General: She is not in acute distress.     Appearance: She is obese. She is not toxic-appearing.   HENT:      Head: Normocephalic and atraumatic.   Eyes:      General: No scleral icterus.     Extraocular Movements: Extraocular movements intact.   Cardiovascular:      Rate and Rhythm: Normal rate and regular rhythm.   Pulmonary:      Effort: No respiratory distress.   Abdominal:      General: Abdomen is flat.      Palpations: Abdomen is soft.      Tenderness: There is no abdominal tenderness.   Musculoskeletal:         General: No swelling or tenderness.   Skin:     Coloration: Skin is not jaundiced.      Findings: Rash present. No bruising. Rash is purpuric.   Neurological:      Mental Status: She is oriented to person, place, and time. Mental status is at baseline.         Last Recorded Vitals  Blood pressure 109/57, pulse 73, temperature 36.3 °C (97.3 °F), temperature source Temporal, resp. rate 16, height 1.651 m (5' 5\"), weight 120 kg (264 lb 12.4 oz), SpO2 99%.  Intake/Output last 3 Shifts:  I/O last 3 completed shifts:  In: 1526.2 (12.7 mL/kg) [P.O.:1200; I.V.:26.2 (0.2 mL/kg); IV Piggyback:300]  Out: 2090 (17.4 mL/kg) [Urine:2090 (0.5 mL/kg/hr)]  Weight: 120.1 kg       Assessment/Plan   Assessment & Plan  Sickle cell disease with crisis and other complication (Multi)    Senait Narvaez is a 53 year-old female with a PMHx of Hgb SC disease (previously on exchange transfusions q4-6 weeks, last transfusion 3/1/24), hx of SVC syndrome, recurrent DVT/PE (on lifelong Coumadin), pHTN (6/2023), " cauda equina with saddle numbness, hx SVT, fibromyalgia, Raynaud's disease, CKD II (baseline SCr~<2) and CAN who presented to OSH ED for diffuse pain and lethargy, then transferred to  MICU for hemodynamic instability. Patient was subsequently intubated due to worsening lethargic and lack of respiratory compensation from significant metabolic acidosis. Vancomycin and Zosyn started for empiric coverage, patient also started on pressors for BP support. Patient was  transfused 2 units pRBCs and then underwent RBCEx on 9/11. Cardiology consulted for c/f NSTEMI vs demand ischemia (ST depressions and elevated troponin), rec treat as ACS. Course further c/b severe CHARLES with peak sCr of 5.4, and acute liver injury with severely elevated liver enzymes (ALT 4119, AST >10k). Liver US only remarkable for fatty infiltration, viral hepatitis panel negative. CHARLES and liver injury improved with supportive management. Pruritic rash noted 9/14, Derm consulted and took multiple biopsies 9/15. Rash then began to worsen to involve the groin, lateral thigh, and scalp with numerous tense bullae. Discontinued heparin given concerns for potential HIT, started bivalirudin. Patient was extubated on 9/16/2024. Started stress dose steroids given continued pressor requirements, pressors Dc'd 9/17. Patient transferred to [facility] 9/17, DC pending improved pain, PT/OT and remainder of derm w/u.      #Purpuric rash  #Concerns for possible heparin-induced thrombocytopenia (HIT)  - around the bilateral breasts, scalp and groin, now worsening to include tense bullae  - Non-erythematous, no purulence. Dermatology consulted. Differential diagnosis includes purpura secondary to infection versus coagulopathy versus vasculopathy versus small and/or medium vessel vasculitis versus calciphylaxis.  - Concerns for possible heparin-induced thrombocytopenia with concurrent skin findings. Discontinued heparin 9/16 and transitioned to bivalirudin  -4 Ts score 0  -Low  Protein C activity  - ANCA, PR3 and MPO negative  - PF4 with reflex to BINA negative   - s/p skin punch biopsies x 2 9/15, awaiting results. Dermatology continuing to follow.  - cryoglobulin labs pending   - Plan for suture removal around 9/25-9/27     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  # ACS  - Previously managed through routine RBC exchanges q6 weeks, most recent RBCEx 3/1/24, per patient pausing on transfusions for time being  - OARRS reviewed, no aberrant behavior noted  - LDH>12,000 (now downtrending), haptoglobin <30, fibrinogen 318, retic % 5.2 on presentation, bili 10.7  - leukocytosis 24.8 on admission to MICU  - CXR (9/10) persistent atelectasis/scarring in the left lower lung  - CT CAP (9/11) Diffuse mosaic attenuation of the lung parenchyma, which can be seen in the setting of small airway disease, pulmonary edema or acute infection  - intubated upon arrival to MICU, now s/p extubation 9/16  - started on vanc and zosyn (finished both courses prior to floor transfer)  - Procal elevated 7.09 (9/11)  - Blood cultures 9/10, 9/12, 9/15 all NGTD  - Strep and Legionella Ag, MRSA nares negative  - trop peak 1431, cards rec treat as ACS (see below)  - S/p 2u pRBCs 9/10 on arrival to MICU  - S/p exchange transfusion 9/11 AM for ACS  - Care plan from 12/6/23- For mild to moderate pain: Dilaudid 0.5mg IVP q3h as needed, for moderate to severe pain Dilaudid 1- 1.5mg q3h PRN  - On arrival to [facility], started on 0.6mg dilaudid IVP q3h PRN severe pain   - bowel regimen for opioid induced constipation, zofran for opioid induced nausea, and benadrly for opioid induced pruritus; LBM 2/28  - c/w home Duloxetine 40mg daily, PO Zofran PRN, Folic Acid 1mg daily, and MVI daily     # ST depression with tropinemia   # Hx SVT  - Elevated troponins on admission, peak 1431 and now downtrending   - EKG with ST depressions, likely Type II MI due to vaso-occlusive crisis and anemia  - TTE 9/11: EF 60-65%, RV enlarged and reduced in function  which appears stable when compared to last TTE (1/2024)  - Troponin leak is likely due to cardiac demand vs supply mismatch in the setting of worsening anemia and vaso-occlusive crisis  - Cardiology consulted, now signed off with indication to treat as ACS  - lasix held in MICU 2/2 hypotension and CHARLES, can resume as able   - Follows with Cardiology outpt, will request FUV closer to DC     # CHARLES on CKD II, improving  - Baseline ~ SCr <2, peak 5.4 this admission  - likely mixed pre-renal/intra-renal, on CKD II, now polyuric phase  - FeNa 1.2%, consistent with ATN  - Severe hyperkalemia with previous peaked T waves on EKG, resolved  - Nephrology followed, now signed off, no need for HD  - s/p multiple K cocktails in MICU     # Acute liver injury, improving  # Direct hyperbilirubinuria, improving  - likely 2/2 hemodynamic instability versus sickle cell crisis  - On presentation to  MICU ALT 2611, AST 4727, T bili 10.2, INR 3.0, continues to improve  - CT with hepatic venous congestion, patient lacks gallbladder  - Liver US with Doppler: Diffuse fatty infiltration, unremarkable doppler interrogation of the liver  - EBV IgG positive, IgM negative  - CMV IgG positive, PCR negative  - Acute hepatitis panel unremarkable  - Hepatology now signed off     # pHTN   - 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 iY and Dr. 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 On lifelong anticoagulation, DOAC discouraged due to high risk of clotting   - home Coumadin 5mg daily HELD this admission 2/2 unstable course  - transition back to coumadin once cleared by Heme  - Follows with Coumadin clinic outpatient  - Caution with fluids      # CAN  - cont home CPAP   - cont home Albuterol PRN     # H/O Cauda Equine syndrome  - Follows Dr. Delacruz as outpatient  - no current issues      # DISPO  - Full code- confirmed on admission  - NOK: Tati Salgado (mother) (#[phone])       I spent >60 minutes in the professional and overall care of this patient.      GRISEL Redmond-CNP

## 2024-09-18 NOTE — CARE PLAN
Problem: Safety - Medical Restraint  Goal: Remains free of injury from restraints (Restraint for Interference with Medical Device)  9/18/2024 0748 by Mandy Mckeon RN  Outcome: Progressing  9/18/2024 0748 by Mandy Mckeon RN  Outcome: Progressing  Goal: Free from restraint(s) (Restraint for Interference with Medical Device)  9/18/2024 0748 by Mandy Mckeon RN  Outcome: Progressing  9/18/2024 0748 by Mandy Mckeon RN  Outcome: Progressing     Problem: Pain - Adult  Goal: Verbalizes/displays adequate comfort level or baseline comfort level  9/18/2024 0748 by Mandy Mckeon RN  Outcome: Progressing  9/18/2024 0748 by Mandy Mckeon RN  Outcome: Progressing     Problem: Safety - Adult  Goal: Free from fall injury  9/18/2024 0748 by Mandy Mckeon RN  Outcome: Progressing  9/18/2024 0748 by Mandy Mckeon RN  Outcome: Progressing     Problem: Skin  Goal: Decreased wound size/increased tissue granulation at next dressing change  9/18/2024 0748 by Mandy Mckeon RN  Outcome: Progressing  9/18/2024 0748 by Mandy Mckeon RN  Outcome: Progressing  Goal: Participates in plan/prevention/treatment measures  9/18/2024 0748 by Mandy Mckeon RN  Outcome: Progressing  9/18/2024 0748 by Mandy Mckeon RN  Outcome: Progressing  Goal: Prevent/manage excess moisture  9/18/2024 0748 by Mandy Mckeon RN  Outcome: Progressing  9/18/2024 0748 by Mandy Mckeon RN  Outcome: Progressing  Goal: Prevent/minimize sheer/friction injuries  9/18/2024 0748 by Mandy Mckeon RN  Outcome: Progressing  9/18/2024 0748 by Mandy Mckeon RN  Outcome: Progressing  Goal: Promote/optimize nutrition  9/18/2024 0748 by Mandy Mckeon RN  Outcome: Progressing  9/18/2024 0748 by Mandy Mckeon RN  Outcome: Progressing  Goal: Promote skin healing  9/18/2024 0748 by Mandy Mckeon RN  Outcome: Progressing  9/18/2024 0748 by Mandy Mckeon RN  Outcome: Progressing     Problem: Knowledge Deficit  Goal: Patient/family/caregiver  No care due was identified.  Health [facility] Embedded Care Due Messages. Reference number: [id number].   1/04/2024 5:11:50 PM CST   demonstrates understanding of disease process, treatment plan, medications, and discharge instructions  9/18/2024 0748 by Mandy Mckeon RN  Outcome: Progressing  9/18/2024 0748 by Mandy Mckeon RN  Outcome: Progressing     Problem: Mechanical Ventilation  Goal: Patient Will Maintain Patent Airway  Outcome: Progressing  Goal: Oral health is maintained or improved  Outcome: Progressing  Goal: Tracheostomy will be managed safely  Outcome: Progressing  Goal: ET tube will be managed safely  Outcome: Progressing  Goal: Ability to express needs and understand communication  Outcome: Progressing  Goal: Mobility/activity is maintained at optimum level for patient  Outcome: Progressing     Problem: Pain  Goal: Takes deep breaths with improved pain control throughout the shift  9/18/2024 0748 by Mandy Mckeon RN  Outcome: Progressing  9/18/2024 0748 by Mandy Mckeon RN  Outcome: Progressing  Goal: Turns in bed with improved pain control throughout the shift  9/18/2024 0748 by Mandy Mckeon RN  Outcome: Progressing  9/18/2024 0748 by Mandy Mckeon RN  Outcome: Progressing  Goal: Walks with improved pain control throughout the shift  9/18/2024 0748 by Mandy Mckeon RN  Outcome: Progressing  9/18/2024 0748 by Mandy Mckeon RN  Outcome: Progressing  Goal: Performs ADL's with improved pain control throughout shift  9/18/2024 0748 by Mandy Mckeon RN  Outcome: Progressing  9/18/2024 0748 by Mandy Mckeon RN  Outcome: Progressing  Goal: Participates in PT with improved pain control throughout the shift  9/18/2024 0748 by Mandy Mckeon RN  Outcome: Progressing  9/18/2024 0748 by Mandy Mckeon RN  Outcome: Progressing  Goal: Free from opioid side effects throughout the shift  9/18/2024 0748 by Mandy Mckeon RN  Outcome: Progressing  9/18/2024 0748 by Mandy Mckeon RN  Outcome: Progressing  Goal: Free from acute confusion related to pain meds throughout the shift  9/18/2024 0748 by Mandy Mckeon  RN  Outcome: Progressing  9/18/2024 0748 by Mandy Mckeon RN  Outcome: Progressing     Problem: Nutrition  Goal: Less than 5 days NPO/clear liquids  9/18/2024 0748 by Mandy Mckeon RN  Outcome: Progressing  9/18/2024 0748 by Mandy Mckeon RN  Outcome: Progressing  Goal: Oral intake greater than 50%  9/18/2024 0748 by Mandy Mckeon RN  Outcome: Progressing  9/18/2024 0748 by Mandy Mckeon RN  Outcome: Progressing  Goal: Oral intake greater 75%  9/18/2024 0748 by Mandy Mckeon RN  Outcome: Progressing  9/18/2024 0748 by Mandy Mckeon RN  Outcome: Progressing  Goal: Consume prescribed supplement  9/18/2024 0748 by Mandy Mckeon RN  Outcome: Progressing  9/18/2024 0748 by Mandy Mckeon RN  Outcome: Progressing  Goal: Adequate PO fluid intake  9/18/2024 0748 by Mandy Mckeon RN  Outcome: Progressing  9/18/2024 0748 by Mandy Mckeon RN  Outcome: Progressing  Goal: Nutrition support goals are met within 48 hrs  9/18/2024 0748 by Mandy Mckeon RN  Outcome: Progressing  9/18/2024 0748 by Mandy Mckeon RN  Outcome: Progressing  Goal: Nutrition support is meeting 75% of nutrient needs  9/18/2024 0748 by Mandy Mckeon RN  Outcome: Progressing  9/18/2024 0748 by Mandy Mckeon RN  Outcome: Progressing  Goal: Tube feed tolerance  9/18/2024 0748 by Mandy Mckeon RN  Outcome: Progressing  9/18/2024 0748 by Mandy Mckeon RN  Outcome: Progressing  Goal: BG  mg/dL  9/18/2024 0748 by Mandy Mckeon RN  Outcome: Progressing  9/18/2024 0748 by Mandy Mckeon RN  Outcome: Progressing  Goal: Lab values WNL  9/18/2024 0748 by Mandy Mckeon RN  Outcome: Progressing  9/18/2024 0748 by Mandy Mckeon RN  Outcome: Progressing  Goal: Electrolytes WNL  9/18/2024 0748 by Mandy Mckeon, RN  Outcome: Progressing  9/18/2024 0748 by Mandy Mckeon RN  Outcome: Progressing  Goal: Promote healing  9/18/2024 0748 by Mandy Mckeon RN  Outcome: Progressing  9/18/2024 0748 by Mandy Mckeon RN  Outcome:  Progressing  Goal: Maintain stable weight  9/18/2024 0748 by Mandy Mckeon RN  Outcome: Progressing  9/18/2024 0748 by Mandy Mckeon RN  Outcome: Progressing  Goal: Reduce weight from edema/fluid  9/18/2024 0748 by Mandy Mckeon RN  Outcome: Progressing  9/18/2024 0748 by Mandy Mckeon RN  Outcome: Progressing  Goal: Gradual weight gain  9/18/2024 0748 by Mandy Mckeon RN  Outcome: Progressing  9/18/2024 0748 by Mandy Mckeon RN  Outcome: Progressing  Goal: Improve ostomy output  9/18/2024 0748 by Mandy Mckeon RN  Outcome: Progressing  9/18/2024 0748 by Mandy Mckeon RN  Outcome: Progressing       The clinical goals for the shift include pt will rate pain less than 7/10 throughout shift

## 2024-09-18 NOTE — PROGRESS NOTES
Physical Therapy    Physical Therapy Treatment    Patient Name: Senait Narvaez  MRN: [id number]  Department: [facility]  Room: [facility]4024-A  Today's Date: 9/18/2024  Time Calculation  Start Time: 1316  Stop Time: 1355  Time Calculation (min): 39 min         Assessment/Plan   PT Assessment  PT Assessment Results: Decreased strength, Decreased endurance, Impaired balance, Decreased mobility, Orthopedic restrictions, Pain  Rehab Prognosis: Good  Barriers to Discharge: none  Evaluation/Treatment Tolerance: Patient limited by fatigue, Patient limited by pain  Medical Staff Made Aware: Yes  Strengths: Attitude of self, Coping skills  Barriers to Participation: Comorbidities  End of Session Communication: Bedside nurse  Assessment Comment: The pt presented with unsafe bed mobility, sit to stand to sit and bed to chair to bed transfers using face-to-face HHA and will require moderate intensity level therapy at this time after d/c.  End of Session Patient Position: Bed, 3 rail up, Alarm on  PT Plan  Inpatient/Swing Bed or Outpatient: Inpatient  PT Plan  Treatment/Interventions: Bed mobility, Transfer training, Gait training, Balance training, Strengthening, Endurance training, Therapeutic exercise, Therapeutic activity, Home exercise program  PT Plan: Ongoing PT  PT Frequency: 3 times per week  PT Discharge Recommendations: Moderate intensity level of continued care  Equipment Recommended upon Discharge:  (TBD)  PT Recommended Transfer Status: Assist x1  PT - OK to Discharge: Yes      General Visit Information:   PT  Visit  PT Received On: 09/18/24  Response to Previous Treatment: Patient reporting fatigue but able to participate.  General  Prior to Session Communication: Bedside nurse  Patient Position Received: Bed, 3 rail up, Alarm on  Preferred Learning Style: verbal, visual, written  General Comment: The pt was pleasant, cooperative and willing to participate in therapy.    Subjective   Precautions:  Precautions  Hearing/Visual  Limitations: Hearing and vision WFL  Medical Precautions: Fall precautions, Oxygen therapy device and L/min  Precautions Comment: Pt in compliance with precautions throughout PT session.    Vital Signs (Past 2hrs)        Date/Time Vitals Session Patient Position Pulse Resp SpO2 BP MAP (mmHg)    09/18/24 1316 Pre PT  Lying  64  --  99 %  --  --                   Objective   Pain:  Pain Assessment  Pain Assessment: 0-10  0-10 (Numeric) Pain Score: 9  Pain Type: Acute pain  Pain Location: Generalized  Pain Descriptors: Aching  Pain Frequency: Constant/continuous  Pain Onset: Ongoing  Clinical Progression: Not changed  Patient's Stated Pain Goal: No pain  Pain Interventions: Therapeutic presence  Response to Interventions: Pain stable  Cognition:  Cognition  Overall Cognitive Status: Within Functional Limits  Orientation Level: Oriented X4  Coordination:  Movements are Fluid and Coordinated: Yes  Coordination Comment: WFL  Postural Control:  Postural Control  Postural Control: Impaired  Posture Comment: Pt presented with good sitting posture and impaired standing posture using face-to-face HHA.  Static Sitting Balance  Static Sitting-Balance Support: Bilateral upper extremity supported, Feet supported  Static Sitting-Level of Assistance: Distant supervision  Static Sitting-Comment/Number of Minutes: Sitting EOB  Dynamic Sitting Balance  Dynamic Sitting-Balance Support: Bilateral upper extremity supported, Feet supported  Dynamic Sitting-Level of Assistance: Distant supervision  Dynamic Sitting-Comments: Sitting EOB  Static Standing Balance  Static Standing-Balance Support: Bilateral upper extremity supported  Static Standing-Level of Assistance: Maximum assistance  Static Standing-Comment/Number of Minutes: using face-to-face HHA  Dynamic Standing Balance  Dynamic Standing-Balance Support: Bilateral upper extremity supported  Dynamic Standing-Level of Assistance: Maximum assistance  Dynamic Standing-Comments: using  face-to-face HHA  Extremity/Trunk Assessments:  RUE   RUE : Within Functional Limits  LUE   LUE: Within Functional Limits  RLE   RLE : Exceptions to WFL  AROM RLE (degrees)  RLE AROM Comment: WFL  Strength RLE  R Hip Flexion: 3/5  R Knee Flexion: 3+/5  R Knee Extension: 3+/5  R Ankle Dorsiflexion: 4/5  R Ankle Plantar Flexion: 4/5  LLE   LLE : Exceptions to WFL  AROM LLE (degrees)  LLE AROM Comment: WFL  Strength LLE  L Hip Flexion: 3/5  L Knee Flexion: 3+/5  L Knee Extension: 3+/5  L Ankle Dorsiflexion: 4/5  L Ankle Plantar Flexion: 4/5  Activity Tolerance:  Activity Tolerance  Endurance: Decreased tolerance for upright activites  Treatments:  Therapeutic Exercise  Therapeutic Exercise Performed: No    Therapeutic Activity  Therapeutic Activity Performed: Yes  Therapeutic Activity 1: Pt performed EOB and OOB activity.    Balance/Neuromuscular Re-Education  Balance/Neuromuscular Re-Education Activity Performed: Yes  Balance/Neuromuscular Re-Education Activity 1: Supervision assist static sitting balance using Raul UE and LE support.  Balance/Neuromuscular Re-Education Activity 2: Supervision assist dynamic sitting balance using Raul UE and LE support.  Balance/Neuromuscular Re-Education Activity 3: Max assist static standing balance using face-to-face HHA.  Balance/Neuromuscular Re-Education Activity 4: Max assist dynamic standing balance using face-to-face HHA.    Bed Mobility  Bed Mobility: Yes  Bed Mobility 1  Bed Mobility 1: Supine to sitting  Level of Assistance 1: Moderate assistance  Bed Mobility Comments 1: HOB elevated  Bed Mobility 2  Bed Mobility  2: Sitting to supine  Level of Assistance 2: Moderate assistance  Bed Mobility Comments 2: HOB elevated    Ambulation/Gait Training  Ambulation/Gait Training Performed: No  Transfers  Transfer: Yes  Transfer 1  Transfer From 1: Sit to  Transfer to 1: Stand  Transfer Device 1:  (no device)  Transfer Level of Assistance 1: Maximum assistance  Trials/Comments 1:  using face-to-face HHA  Transfers 2  Transfer From 2: Stand to  Transfer to 2: Sit  Transfer Device 2:  (no device)  Transfer Level of Assistance 2: Maximum assistance  Trials/Comments 2: using face-to-face HHA  Transfers 3  Transfer From 3: Bed to  Transfer to 3: Chair with arms  Transfer Device 3:  (no device)  Transfer Level of Assistance 3: Maximum assistance  Trials/Comments 3: using face-to-face HHA  Transfers 4  Transfer From 4: Chair with arms to  Transfer to 4: Bed  Transfer Device 4:  (no device)  Transfer Level of Assistance 4: Maximum assistance  Trials/Comments 4: using face-to-face HHA    Stairs  Stairs: No    Outcome Measures:  [facility] Basic Mobility  Turning from your back to your side while in a flat bed without using bedrails: A little  Moving from lying on your back to sitting on the side of a flat bed without using bedrails: A lot  Moving to and from bed to chair (including a wheelchair): A lot  Standing up from a chair using your arms (e.g. wheelchair or bedside chair): A lot  To walk in hospital room: Total  Climbing 3-5 steps with railing: Total  Basic Mobility - Total Score: 11    OP EDUCATION:  Outpatient Education  Individual(s) Educated: Patient  Education Provided: Body Mechanics, Fall Risk, Home Safety, POC, Post-Op Precautions, Posture  Patient Response to Education: Patient/Caregiver Verbalized Understanding of Information, Patient/Caregiver Performed Return Demonstration of Exercises/Activities    Encounter Problems       Encounter Problems (Active)       PT Problem       Patient will complete supine to sit and sit to supine Independent  (Progressing)       Start:  09/17/24    Expected End:  10/01/24            Patient will perform sit<>stand transfer with LRAD, and Modified Independent  (Progressing)       Start:  09/17/24    Expected End:  10/01/24            Patient will ambulate >150' with LRAD and Modified Independent  (Progressing)       Start:  09/17/24    Expected End:  10/01/24             Patient will complete Tinetti Balance Assesment with a score equal to or better than 18 to reduce falls risk.    (Progressing)       Start:  09/17/24    Expected End:  10/01/24               Pain - Adult

## 2024-09-18 NOTE — PROCEDURES
Biopsy    Date/Time: 9/18/2024 3:18 PM    Performed by: Aracely Rg DO  Authorized by: Shad Sands MD    Consent:     Consent obtained:  Verbal    Consent given by:  Patient    Risks, benefits, and alternatives were discussed: yes      Risks discussed:  Bleeding, infection, pain and poor cosmetic result  Universal protocol:     Procedure explained and questions answered to patient or proxy's satisfaction: yes      Relevant documents present and verified: yes      Site/side marked: yes      Immediately prior to procedure, a time out was called: yes      Patient identity confirmed:  Verbally with patient  Pre-procedure details:     Procedure prep: alcohol.  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     The lesion and surrounding area was given a sterile prep using alcohol and draped in the usual sterile fashion. The skin was then stretched perpendicular to the skin tension lines and the lesion removed using the 4mm punch. The resulting ellipse was then closed. The wound was closed with 5-0 fast absorbing using simple interrupted stitches. Vaseline ointment and a sterile dressing applied. The specimen was sent for pathologic examination. The patient tolerated the procedure well.     EBL: 0 ml      Plan:  1. For wound care, keep bandage on for 24 hours and to avoid bathing until then. Afterwards, continue to wash with soap and water daily, change bandage daily with Vaseline application x 7 days.                Post-procedure  details:     Procedure completion:  Tolerated well, no immediate complications    I was present during all critical and key portions of the procedure(s) and immediately available to furnish services the entire duration.  See resident note for details.   Shad Sands MD

## 2024-09-19 LAB
ALBUMIN SERPL BCP-MCNC: 2.8 G/DL (ref 3.4–5)
ALP SERPL-CCNC: 193 U/L (ref 33–110)
ALT SERPL W P-5'-P-CCNC: 153 U/L (ref 7–45)
ANION GAP SERPL CALC-SCNC: 14 MMOL/L (ref 10–20)
AST SERPL W P-5'-P-CCNC: 49 U/L (ref 9–39)
BACTERIA BLD CULT: NORMAL
BILIRUB SERPL-MCNC: 5.2 MG/DL (ref 0–1.2)
BUN SERPL-MCNC: 54 MG/DL (ref 6–23)
CALCIUM SERPL-MCNC: 8.6 MG/DL (ref 8.6–10.6)
CHLORIDE SERPL-SCNC: 103 MMOL/L (ref 98–107)
CO2 SERPL-SCNC: 26 MMOL/L (ref 21–32)
CREAT SERPL-MCNC: 2.22 MG/DL (ref 0.5–1.05)
DRVVT SCREEN TO CONFIRM RATIO: 3.25 RATIO
DRVVT/DRVVT CFM NRMLZD PPP-RTO: 2.21 RATIO
DRVVT/DRVVT CFM P DOAC NEUT NORM PPP-RTO: 1.47 RATIO
EGFRCR SERPLBLD CKD-EPI 2021: 26 ML/MIN/1.73M*2
ERYTHROCYTE [DISTWIDTH] IN BLOOD BY AUTOMATED COUNT: 21.8 % (ref 11.5–14.5)
ERYTHROCYTE [DISTWIDTH] IN BLOOD BY AUTOMATED COUNT: 21.9 % (ref 11.5–14.5)
GLUCOSE SERPL-MCNC: 65 MG/DL (ref 74–99)
HCT VFR BLD AUTO: 27.5 % (ref 36–46)
HCT VFR BLD AUTO: 27.9 % (ref 36–46)
HGB BLD-MCNC: 9.1 G/DL (ref 12–16)
HGB BLD-MCNC: 9.1 G/DL (ref 12–16)
HGB RETIC QN: 33 PG (ref 28–38)
IMMATURE RETIC FRACTION: 21.2 %
INR PPP: 2.3 (ref 0.9–1.1)
LA 2 SCREEN W REFLEX-IMP: ABNORMAL
LAB AP ASR DISCLAIMER: NORMAL
LABORATORY COMMENT REPORT: NORMAL
LABORATORY COMMENT REPORT: NORMAL
LDH SERPL L TO P-CCNC: 501 U/L (ref 84–246)
MAGNESIUM SERPL-MCNC: 2.11 MG/DL (ref 1.6–2.4)
MAGNESIUM SERPL-MCNC: 2.11 MG/DL (ref 1.6–2.4)
MCH RBC QN AUTO: 29.6 PG (ref 26–34)
MCH RBC QN AUTO: 30.4 PG (ref 26–34)
MCHC RBC AUTO-ENTMCNC: 32.6 G/DL (ref 32–36)
MCHC RBC AUTO-ENTMCNC: 33.1 G/DL (ref 32–36)
MCV RBC AUTO: 90 FL (ref 80–100)
MCV RBC AUTO: 93 FL (ref 80–100)
NORMALIZED SCT PPP-RTO: 0.53 RATIO
NRBC BLD-RTO: 41.8 /100 WBCS (ref 0–0)
NRBC BLD-RTO: 41.8 /100 WBCS (ref 0–0)
PATH REPORT.FINAL DX SPEC: NORMAL
PATH REPORT.FINAL DX SPEC: NORMAL
PATH REPORT.GROSS SPEC: NORMAL
PATH REPORT.GROSS SPEC: NORMAL
PATH REPORT.RELEVANT HX SPEC: NORMAL
PATH REPORT.RELEVANT HX SPEC: NORMAL
PATH REPORT.TOTAL CANCER: NORMAL
PATH REPORT.TOTAL CANCER: NORMAL
PLATELET # BLD AUTO: 275 X10*3/UL (ref 150–450)
PLATELET # BLD AUTO: 279 X10*3/UL (ref 150–450)
POTASSIUM SERPL-SCNC: 3.7 MMOL/L (ref 3.5–5.3)
PROT SERPL-MCNC: 5.8 G/DL (ref 6.4–8.2)
PROTHROMBIN TIME: 26.4 SECONDS (ref 9.8–12.8)
RBC # BLD AUTO: 2.99 X10*6/UL (ref 4–5.2)
RBC # BLD AUTO: 3.07 X10*6/UL (ref 4–5.2)
RETICS #: 0.63 X10*6/UL (ref 0.02–0.08)
RETICS/RBC NFR AUTO: 20.7 % (ref 0.5–2)
SILICA CLOTTING TIME CONFIRMATION: 2.18 RATIO
SILICA CLOTTING TIME SCREEN: 1.16 RATIO
SODIUM SERPL-SCNC: 139 MMOL/L (ref 136–145)
WBC # BLD AUTO: 15.9 X10*3/UL (ref 4.4–11.3)
WBC # BLD AUTO: 16.6 X10*3/UL (ref 4.4–11.3)

## 2024-09-19 PROCEDURE — 85027 COMPLETE CBC AUTOMATED: CPT

## 2024-09-19 PROCEDURE — 2500000004 HC RX 250 GENERAL PHARMACY W/ HCPCS (ALT 636 FOR OP/ED)

## 2024-09-19 PROCEDURE — 80053 COMPREHEN METABOLIC PANEL: CPT

## 2024-09-19 PROCEDURE — 1170000001 HC PRIVATE ONCOLOGY ROOM DAILY

## 2024-09-19 PROCEDURE — 85610 PROTHROMBIN TIME: CPT

## 2024-09-19 PROCEDURE — 83615 LACTATE (LD) (LDH) ENZYME: CPT

## 2024-09-19 PROCEDURE — 99233 SBSQ HOSP IP/OBS HIGH 50: CPT

## 2024-09-19 PROCEDURE — 99232 SBSQ HOSP IP/OBS MODERATE 35: CPT | Performed by: STUDENT IN AN ORGANIZED HEALTH CARE EDUCATION/TRAINING PROGRAM

## 2024-09-19 PROCEDURE — 2500000001 HC RX 250 WO HCPCS SELF ADMINISTERED DRUGS (ALT 637 FOR MEDICARE OP)

## 2024-09-19 PROCEDURE — 85045 AUTOMATED RETICULOCYTE COUNT: CPT

## 2024-09-19 PROCEDURE — 2500000002 HC RX 250 W HCPCS SELF ADMINISTERED DRUGS (ALT 637 FOR MEDICARE OP, ALT 636 FOR OP/ED)

## 2024-09-19 PROCEDURE — 83735 ASSAY OF MAGNESIUM: CPT

## 2024-09-19 PROCEDURE — 36415 COLL VENOUS BLD VENIPUNCTURE: CPT

## 2024-09-19 ASSESSMENT — ACTIVITIES OF DAILY LIVING (ADL): LACK_OF_TRANSPORTATION: NO

## 2024-09-19 ASSESSMENT — PAIN SCALES - GENERAL
PAINLEVEL_OUTOF10: 8
PAINLEVEL_OUTOF10: 7
PAINLEVEL_OUTOF10: 8
PAINLEVEL_OUTOF10: 5 - MODERATE PAIN
PAINLEVEL_OUTOF10: 7
PAINLEVEL_OUTOF10: 5 - MODERATE PAIN

## 2024-09-19 ASSESSMENT — PAIN DESCRIPTION - LOCATION
LOCATION: GENERALIZED
LOCATION: GENERALIZED

## 2024-09-19 ASSESSMENT — PAIN - FUNCTIONAL ASSESSMENT
PAIN_FUNCTIONAL_ASSESSMENT: 0-10

## 2024-09-19 NOTE — CARE PLAN
Problem: Pain - Adult  Goal: Verbalizes/displays adequate comfort level or baseline comfort level  Outcome: Progressing     Problem: Safety - Adult  Goal: Free from fall injury  Outcome: Progressing     Problem: Skin  Goal: Decreased wound size/increased tissue granulation at next dressing change  Outcome: Progressing  Goal: Participates in plan/prevention/treatment measures  Outcome: Progressing  Goal: Prevent/manage excess moisture  Outcome: Progressing  Goal: Prevent/minimize sheer/friction injuries  Outcome: Progressing  Goal: Promote/optimize nutrition  Outcome: Progressing  Goal: Promote skin healing  Outcome: Progressing     Problem: Knowledge Deficit  Goal: Patient/family/caregiver demonstrates understanding of disease process, treatment plan, medications, and discharge instructions  Outcome: Progressing     Problem: Pain  Goal: Takes deep breaths with improved pain control throughout the shift  Outcome: Progressing  Goal: Turns in bed with improved pain control throughout the shift  Outcome: Progressing  Goal: Walks with improved pain control throughout the shift  Outcome: Progressing  Goal: Performs ADL's with improved pain control throughout shift  Outcome: Progressing  Goal: Participates in PT with improved pain control throughout the shift  Outcome: Progressing  Goal: Free from opioid side effects throughout the shift  Outcome: Progressing  Goal: Free from acute confusion related to pain meds throughout the shift  Outcome: Progressing     Problem: Nutrition  Goal: Less than 5 days NPO/clear liquids  Outcome: Progressing  Goal: Oral intake greater than 50%  Outcome: Progressing  Goal: Oral intake greater 75%  Outcome: Progressing  Goal: Consume prescribed supplement  Outcome: Progressing  Goal: Adequate PO fluid intake  Outcome: Progressing  Goal: Nutrition support goals are met within 48 hrs  Outcome: Progressing  Goal: Nutrition support is meeting 75% of nutrient needs  Outcome: Progressing  Goal: BG   mg/dL  Outcome: Progressing  Goal: Lab values WNL  Outcome: Progressing  Goal: Electrolytes WNL  Outcome: Progressing  Goal: Promote healing  Outcome: Progressing  Goal: Maintain stable weight  Outcome: Progressing  Goal: Reduce weight from edema/fluid  Outcome: Progressing       The clinical goals for the shift include pt will remain VSS  throughout shift    Over the shift, the patient remained safe and free from injury. Had generalized pain, medicated PRN. Remained VSS throughout shift. No acute events overnight

## 2024-09-19 NOTE — PROGRESS NOTES
09/19/24 1400   Discharge Planning   Living Arrangements Alone   Support Systems Parent;Children   Type of Residence Private residence   Number of Stairs to Enter Residence 0   Number of Stairs Within Residence 0   Do you have animals or pets at home? No   Who is requesting discharge planning? Provider   Home or Post Acute Services In home services   Expected Discharge Disposition Home H   Does the patient need discharge transport arranged? Yes   RoundTrip coordination needed? Yes   Financial Resource Strain   How hard is it for you to pay for the very basics like food, housing, medical care, and heating? Not hard   Housing Stability   In the last 12 months, was there a time when you were not able to pay the mortgage or rent on time? N   In the past 12 months, how many times have you moved where you were living? 0   Transportation Needs   In the past 12 months, has lack of transportation kept you from medical appointments or from getting medications? no   In the past 12 months, has lack of transportation kept you from meetings, work, or from getting things needed for daily living? No     SW met with pt to complete admission assessment.  Pt lives alone in a first floor apartment.  Pt does use an elevator to get to her apartment and there are not steps to enter or inside.  Pts primary support / emergency contact is her mother Tati [phone] and her daughter Tiesha [phone].  Pt does not have any pets.  Pt reports she feels safe at home. Pt does not work and receives SSD.   Pt reports that all of her basic needs are being met.  Pt reports that she was independent with all ADL's prior to this admission.  Pt does drive.  Pts preferred pharmacy is [facility] closest to her home.  SW discussed SNF, provided pt with choice list and requested pt review list and provide 3-5 facilities.  Pt denies any other needs or concerns at this time.  SW to follow up with pt to obtain SNF choices and initiate referrals.  SW to  continue to follow and advise of updates.  Elizabeth Gunsalus LISW-S  Care Transitions Supervisor    Addendum:  Pts mother Mylene presented and advised care team that pt will not be going to SNF.  SW met with pt and Mylene to discuss.  Mylene advised that pt had a previous SNF stay and they were not happy with her care.  Pt also verbalized that she will not go to SNF.  Mylene shared that she and pts daughter will provide support to pt when she is discharged home.  Pt is in agreement with home care and would like [facility] to follow.  SW will advise med team of the above.  SW to continue to follow and advise of updates.  Elizabeth Gunsalus LISW-S  Care Transitions Supervisor

## 2024-09-19 NOTE — SIGNIFICANT EVENT
Rapid Response RN Note    Rapid response RN paged for RADAR score 6 due to the following VS: T 36.1 °C; HR 68; RR 16; BP 97/63; SPO2 93%.  .  Reviewed above VS with bedside RN.  VS within patient's current trends.  Patient denied pain, shortness of breath, dizziness or lightheadedness.  No interventions by rapid response team indicated at this time.      Staff to page rapid response for any concerns or acute change in condition/VS.

## 2024-09-19 NOTE — PROGRESS NOTES
Name: Senait Narvaez  MRN: [id number]  Encounter Date: 9/19/2024  PCP: No Assigned PCP Generic Provider, MD  Heme-Onc: Dr. Hill     Reason for consult: Hx of SC disease, shock with multiorgan system failure, now s/p exchange transfusion   Attending Provider Amanda LU     Hematology/ Oncology Consult Daily Progress Note    Overnight Events   Patient states pain from rash is unchanged. States rash is not enlarging.     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 Patient previously dependent on red cell exchange transfusion which was on hold per patient preference. Hx of avascular necrosis of R hip      From discussion with mother on 9/12-- She was previously having exchanges q4-6 weeks, but suffered extreme pain after exchanges and often required hospitalizations after them. She would feel better around 10 days after exchange. The repeated burden and pain of exchanges was becoming too much for her, so she decided to take a break, discussed with Dr. Whaley. She has not had an exchange since March. Mother reports she has had multiple ICU admissions requiring intubation, most recent she believes in 2017, cannot recall the one before that.    From 12/06/2023 Heme Onc note-   She was diagnosed with SVC syndrome, she had Bilateral Upper Extremity and Facial Swelling d/t partial filling defect within the SVC and a thrombus of the SVC complicated by bilateral Mediport catheters. Vasc med consulted, rec lifelong coumadin. She was on heparin drip bridge and coumadin was initiated on 1/16. INR 3.0 on 1/29/2019. She is status post Venogram, SVC balloon angioplasty and Right mediport removal (1/15/20) and status post placement of left IJ temporary apharesis catheter, SVC angiogram, Balloon angioplasty of SVC/left brachiocephalic vein, Removal of left sided Mediport catheter (1/21/20). She was given IV diuretics lasix 2/26-29 for UE edema 2/2 SVC syndrome with edema. Breast remain swollen recommended breast  "binder. SVC in Nov 2022 with stent placed.      H&P from 1/8/2018 refers to multiorgan failure x2 with hepatopathy (once in Dec 2017)    Review of Systems   Negative unless listed in HPI    Allergies   No Known Allergies    Medications     acetaminophen, 975 mg, q12h  bisacodyl, 10 mg, Daily  diphenhydramine-zinc acetate, , TID  folic acid, 1 mg, Daily  metoprolol tartrate, 12.5 mg, BID  pantoprazole, 40 mg, Daily  polyethylene glycol, 17 g, BID  sennosides, 2 tablet, BID  thiamine, 100 mg, Daily  warfarin, 5 mg, Daily      bivalirudin, Last Rate: 0.05 mg/kg/hr (09/19/24 0722)      albuterol, 2.5 mg, q6h PRN  alteplase, 2 mg, PRN  dextrose, 12.5 g, q15 min PRN  dextrose, 25 g, q15 min PRN  glucagon, 1 mg, q15 min PRN  glucagon, 1 mg, q15 min PRN  HYDROmorphone, 0.6 mg, q3h PRN  ondansetron, 4 mg, q8h PRN  oxyCODONE, 10 mg, q4h PRN  oxygen, , Continuous PRN - O2/gases        Physical Exam   Blood pressure 107/72, pulse 69, temperature 36.7 °C (98.1 °F), temperature source Temporal, resp. rate 16, height 1.651 m (5' 5\"), weight 120 kg (264 lb 12.4 oz), SpO2 100%.    Gen: awake with minimal discomfort   HEENT: AT/NC  Pulm: no respiratory distress  Skin: warm and dry, dark purple retiform patches across bilateral breasts superiorly and inferiorly, upper lateral trunk and bilateral flanks    Labs      Latest Reference Range & Units 09/19/24 07:59   GLUCOSE 74 - 99 mg/dL 65 (L)   SODIUM 136 - 145 mmol/L 139   POTASSIUM 3.5 - 5.3 mmol/L 3.7   CHLORIDE 98 - 107 mmol/L 103   Bicarbonate 21 - 32 mmol/L 26   Anion Gap 10 - 20 mmol/L 14   Blood Urea Nitrogen 6 - 23 mg/dL 54 (H)   Creatinine 0.50 - 1.05 mg/dL 2.22 (H)   EGFR >60 mL/min/1.73m*2 26 (L)   Calcium 8.6 - 10.6 mg/dL 8.6   Albumin 3.4 - 5.0 g/dL 2.8 (L)   Alkaline Phosphatase 33 - 110 U/L 193 (H)   ALT 7 - 45 U/L 153 (H)   AST 9 - 39 U/L 49 (H)   Bilirubin Total 0.0 - 1.2 mg/dL 5.2 (H)   (L): Data is abnormally low  (H): Data is abnormally high     Latest Reference " Range & Units 09/19/24 07:59   LDH 84 - 246 U/L 501 (H)   (H): Data is abnormally high   Latest Reference Range & Units 09/19/24 15:23   WBC 4.4 - 11.3 x10*3/uL 15.9 (H)   nRBC 0.0 - 0.0 /100 WBCs 41.8 (H)   RBC 4.00 - 5.20 x10*6/uL 2.99 (L)   HEMOGLOBIN 12.0 - 16.0 g/dL 9.1 (L)   HEMATOCRIT 36.0 - 46.0 % 27.9 (L)   MCV 80 - 100 fL 93   MCH 26.0 - 34.0 pg 30.4   MCHC 32.0 - 36.0 g/dL 32.6   RED CELL DISTRIBUTION WIDTH 11.5 - 14.5 % 21.8 (H)   Platelets 150 - 450 x10*3/uL 275   (H): Data is abnormally high  (L): Data is abnormally low    Imaging   Reviewed     Assessment/Plan     Senait Narvaez is a 54 y.o. 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 Patient was transferred to [facility] and subsequently had hypotension requiring vasopressors and acidosis with inadequate respiratory compensation requiring intubation.      NORA is negative, and review of blood smear did not show schistocytes or other evidence of hemolysis. Hemolysis does not fit with clinical picture and there is no evidence that patient has had hemolytic reactions previously. 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     Etiology for new onset hypoxia is unclear in this patient, as it could be an early or atypical presentation of acute chest syndrome, or pulmonary embolism. Infection also cannot be ruled out. Patient is severely thrombocytopenic, and warfarin has not been reversed. Primary team can consider reversal.     Patient is s/p exchange transfusion (6u pRBC)and 2u pRBC, which should help prevent further sickling and its sequelae. Hgb S is well below goal of 30% of total Hgb. We  recommend CBCs, reticulocyte counts, LDH, and DIC panel daily.    9/19 Update: Continue supportive care. No indication for repeat exchange at this time.  PF4 assay negative, so low concern for HIT. Patient bridging to warfarin from bivalrudin.    Hematology to sign-off at this time. We recommend close outpatient follow-up with Dr. Whaley.  Patient seen and discussed with attending physician, Dr. Morillo, who agrees with the above.     Irene Jarrett, MS4  Hematology Consult Pager: [phone]  Oncology Consult Pager: [phone]

## 2024-09-19 NOTE — PROGRESS NOTES
"Senait Narvaez is a 54 y.o. female on day 9 of admission presenting with No Principal Problem: There is no principal problem currently on the Problem List. Please update the Problem List and refresh..    Subjective   NAEON. Seen at bedside, reports still having pain from rash. Denies N/V/D/C, chest pain, cough, shortness of breath.  Pain right now is 10/10, will ask for pain meds. Rash seems to look better today, not as raised as yesterday. Bullae still present but rash no longer spreading. She seems tired today but states she feels at her baseline.        Objective     Physical Exam  Constitutional:       General: She is not in acute distress.     Appearance: She is not toxic-appearing.   HENT:      Head: Normocephalic and atraumatic.   Eyes:      General: No scleral icterus.     Extraocular Movements: Extraocular movements intact.   Cardiovascular:      Rate and Rhythm: Normal rate and regular rhythm.   Pulmonary:      Effort: No respiratory distress.   Abdominal:      Tenderness: There is no abdominal tenderness.   Musculoskeletal:         General: No swelling or tenderness.   Skin:     Coloration: Skin is not jaundiced or pale.      Findings: Rash (purpuric, + bullae) present. No bruising.   Neurological:      Mental Status: She is oriented to person, place, and time. Mental status is at baseline.         Last Recorded Vitals  Blood pressure 103/67, pulse 67, temperature 36.6 °C (97.9 °F), temperature source Temporal, resp. rate 16, height 1.651 m (5' 5\"), weight 120 kg (264 lb 12.4 oz), SpO2 99%.  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 ED for diffuse pain and lethargy, then transferred to  MICU for hemodynamic instability. Patient was subsequently intubated due to worsening lethargic and lack of respiratory compensation from significant metabolic acidosis. Vancomycin and Zosyn started for empiric coverage, patient also started on pressors for BP support. Patient was  transfused 2 units pRBCs and then underwent RBCEx on 9/11. Cardiology consulted for c/f NSTEMI vs demand ischemia (ST depressions and elevated troponin), rec treat as ACS. Course further c/b severe CHARLES with peak sCr of 5.4, and acute liver injury with severely elevated liver enzymes (ALT 4119, AST >10k). Liver US only remarkable for fatty infiltration, viral hepatitis panel negative. CHARLES and liver injury improved with supportive management. Pruritic rash noted 9/14, Derm consulted and took multiple biopsies 9/15. Rash then began to worsen to involve the groin, lateral thigh, and scalp with numerous tense bullae. Discontinued heparin given concerns for potential HIT, started bivalirudin. Patient was extubated on 9/16/2024. Started stress dose steroids given continued pressor requirements, pressors Dc'd 9/17. Patient transferred to [facility] 9/17, PT/OT rec SNF. Coumadin bridge was started 9/18. Given persistent rash with unremarkable labs, derm re-biopsied on 9/18. DC pending improved pain, remainder of derm w/u, and SNF placement.      #Purpuric rash  #Concerns for possible heparin-induced thrombocytopenia (HIT)  - around the bilateral breasts, scalp and groin, now worsening to include tense bullae  - Non-erythematous, no purulence. Dermatology consulted. Differential diagnosis includes purpura secondary to infection versus coagulopathy versus vasculopathy versus small and/or  medium vessel vasculitis versus calciphylaxis.  - Concerns for possible heparin-induced thrombocytopenia with concurrent skin findings. Discontinued heparin 9/16 and transitioned to bivalirudin  - 4 Ts score 0  - Low Protein C activity  - ANCA, PR3 and MPO negative  - PF4 with reflex to BINA negative   - s/p skin punch biopsies x 2 9/15 and one on 9/18, awaiting results.   - Dermatology continuing to follow  - cryoglobulin labs pending   - Plan for suture removal around 9/25-9/27     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  # ACS  - Previously managed through routine RBC exchanges q6 weeks, most recent RBCEx 3/1/24, per patient pausing on transfusions for time being  - OARRS reviewed, no aberrant behavior noted  - LDH>12,000 (now downtrending), haptoglobin <30, fibrinogen 318, retic % 5.2 on presentation, bili 10.7  - leukocytosis 24.8 on admission to MICU  - CXR (9/10) persistent atelectasis/scarring in the left lower lung  - CT CAP (9/11) Diffuse mosaic attenuation of the lung parenchyma, which can be seen in the setting of small airway disease, pulmonary edema or acute infection  - intubated upon arrival to MICU, now s/p extubation 9/16  - started on vanc and zosyn (finished both courses prior to floor transfer)  - Procal elevated 7.09 (9/11)  - Blood cultures 9/10, 9/12, 9/15 all NGTD  - Strep and Legionella Ag, MRSA nares negative  - trop peak 1431, cards rec treat as ACS (see below)  - S/p 2u pRBCs 9/10 on arrival to MICU  - S/p exchange transfusion 9/11 AM for ACS  - Care plan from 12/6/23- For mild to moderate pain: Dilaudid 0.5mg IVP q3h as needed, for moderate to severe pain Dilaudid 1- 1.5mg q3h PRN  - On arrival to [facility], started on 0.6mg dilaudid IVP q3h PRN severe pain   - bowel regimen for opioid induced constipation, zofran for opioid induced nausea, and benadrly for opioid induced pruritus; LBM 2/28  - c/w home Duloxetine 40mg daily, PO Zofran PRN, Folic Acid 1mg daily, and MVI daily     # ST depression with  tropinemia   # Hx SVT  - Elevated troponins on admission, peak 1431 and now downtrending   - EKG with ST depressions, likely Type II MI due to vaso-occlusive crisis and anemia  - TTE 9/11: EF 60-65%, RV enlarged and reduced in function which appears stable when compared to last TTE (1/2024)  - Troponin leak is likely due to cardiac demand vs supply mismatch in the setting of worsening anemia and vaso-occlusive crisis  - Cardiology consulted, now signed off with indication to treat as ACS  - lasix held in MICU 2/2 hypotension and CHARLES, can resume as able   - Follows with Cardiology outpt, will request FUV closer to DC     # CHARLES on CKD II, improving  - Baseline ~ SCr <2, peak 5.4 this admission  - likely mixed pre-renal/intra-renal, on CKD II, now polyuric phase  - FeNa 1.2%, consistent with ATN  - Severe hyperkalemia with previous peaked T waves on EKG, resolved  - Nephrology followed, now signed off, no need for HD  - s/p multiple K cocktails in MICU     # Acute liver injury, improving  # Direct hyperbilirubinuria, improving  - likely 2/2 hemodynamic instability versus sickle cell crisis  - On presentation to  MICU ALT 2611, AST 4727, T bili 10.2, INR 3.0, continues to improve  - CT with hepatic venous congestion, patient lacks gallbladder  - Liver US with Doppler: Diffuse fatty infiltration, unremarkable doppler interrogation of the liver  - EBV IgG positive, IgM negative  - CMV IgG positive, PCR negative  - Acute hepatitis panel unremarkable  - Hepatology now signed off     # pHTN   - 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 Yi and Dr. 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 On lifelong anticoagulation, DOAC discouraged due to high risk of clotting   - home Coumadin 5mg daily initially HELD on admission to MICU 2/2 unstable course  - restarted coumadin 9/18 PM, will plan to bridge with bival, Ok'd by heme   - Follows with Coumadin clinic outpatient  - Caution with fluids      # CAN  - cont home CPAP   - cont home Albuterol PRN     # H/O Cauda Equine syndrome  - Follows Dr. Delacruz as outpatient  - no current issues      # DISPO  - Full code- confirmed on admission  - NOK: Tati Salgado (mother) (#[phone])  - PT/OT rec SNF, pending choice/placement  - FUV PCP 10/1, Anim 10/7, Cardiology 2/13       I spent >60 minutes in the professional and overall care of this patient.      Jazmin Pavon, GRISEL-CNP

## 2024-09-20 LAB
ALBUMIN SERPL BCP-MCNC: 2.7 G/DL (ref 3.4–5)
ALP SERPL-CCNC: 172 U/L (ref 33–110)
ALT SERPL W P-5'-P-CCNC: 130 U/L (ref 7–45)
ANA SER QL HEP2 SUBST: NEGATIVE
ANION GAP SERPL CALC-SCNC: 11 MMOL/L (ref 10–20)
APTT PPP: 68 SECONDS (ref 27–38)
ASPERGILLUS AB SER QL ID: NOT DETECTED
ASPERGILLUS AB TITR SER CF: NORMAL {TITER}
AST SERPL W P-5'-P-CCNC: 35 U/L (ref 9–39)
ATRIAL RATE: 87 BPM
BILIRUB SERPL-MCNC: 3.9 MG/DL (ref 0–1.2)
BUN SERPL-MCNC: 47 MG/DL (ref 6–23)
CALCIUM SERPL-MCNC: 8.8 MG/DL (ref 8.6–10.6)
CHLORIDE SERPL-SCNC: 105 MMOL/L (ref 98–107)
CO2 SERPL-SCNC: 29 MMOL/L (ref 21–32)
CREAT SERPL-MCNC: 1.79 MG/DL (ref 0.5–1.05)
CRYOGLOBULIN  (SJC): NORMAL
EGFRCR SERPLBLD CKD-EPI 2021: 33 ML/MIN/1.73M*2
ERYTHROCYTE [DISTWIDTH] IN BLOOD BY AUTOMATED COUNT: 21.5 % (ref 11.5–14.5)
ERYTHROCYTE [DISTWIDTH] IN BLOOD BY AUTOMATED COUNT: 22.4 % (ref 11.5–14.5)
GLUCOSE SERPL-MCNC: 72 MG/DL (ref 74–99)
HCT VFR BLD AUTO: 25.7 % (ref 36–46)
HCT VFR BLD AUTO: 32.1 % (ref 36–46)
HGB BLD-MCNC: 10.4 G/DL (ref 12–16)
HGB BLD-MCNC: 8.7 G/DL (ref 12–16)
HGB RETIC QN: 33 PG (ref 28–38)
IMMATURE RETIC FRACTION: 22.3 %
INR PPP: 3.7 (ref 0.9–1.1)
INR PPP: 3.8 (ref 0.9–1.1)
LDH SERPL L TO P-CCNC: 389 U/L (ref 84–246)
MAGNESIUM SERPL-MCNC: 1.97 MG/DL (ref 1.6–2.4)
MAGNESIUM SERPL-MCNC: 2.23 MG/DL (ref 1.6–2.4)
MCH RBC QN AUTO: 30.7 PG (ref 26–34)
MCH RBC QN AUTO: 31 PG (ref 26–34)
MCHC RBC AUTO-ENTMCNC: 32.4 G/DL (ref 32–36)
MCHC RBC AUTO-ENTMCNC: 33.9 G/DL (ref 32–36)
MCV RBC AUTO: 92 FL (ref 80–100)
MCV RBC AUTO: 95 FL (ref 80–100)
NRBC BLD-RTO: 28 /100 WBCS (ref 0–0)
NRBC BLD-RTO: 31 /100 WBCS (ref 0–0)
P AXIS: 54 DEGREES
P OFFSET: 186 MS
P ONSET: 133 MS
PLATELET # BLD AUTO: 299 X10*3/UL (ref 150–450)
PLATELET # BLD AUTO: 373 X10*3/UL (ref 150–450)
POTASSIUM SERPL-SCNC: 3.4 MMOL/L (ref 3.5–5.3)
PR INTERVAL: 160 MS
PROT SERPL-MCNC: 5.8 G/DL (ref 6.4–8.2)
PROTHROMBIN TIME: 42.4 SECONDS (ref 9.8–12.8)
PROTHROMBIN TIME: 43.8 SECONDS (ref 9.8–12.8)
Q ONSET: 213 MS
QRS COUNT: 15 BEATS
QRS DURATION: 78 MS
QT INTERVAL: 356 MS
QTC CALCULATION(BAZETT): 428 MS
QTC FREDERICIA: 402 MS
R AXIS: 53 DEGREES
RBC # BLD AUTO: 2.81 X10*6/UL (ref 4–5.2)
RBC # BLD AUTO: 3.39 X10*6/UL (ref 4–5.2)
RETICS #: 0.48 X10*6/UL (ref 0.02–0.08)
RETICS/RBC NFR AUTO: 17.1 % (ref 0.5–2)
SCAN RESULT: NORMAL
SODIUM SERPL-SCNC: 142 MMOL/L (ref 136–145)
T AXIS: 57 DEGREES
T OFFSET: 391 MS
VENTRICULAR RATE: 87 BPM
WBC # BLD AUTO: 19.6 X10*3/UL (ref 4.4–11.3)
WBC # BLD AUTO: 23.2 X10*3/UL (ref 4.4–11.3)

## 2024-09-20 PROCEDURE — 83735 ASSAY OF MAGNESIUM: CPT

## 2024-09-20 PROCEDURE — 99233 SBSQ HOSP IP/OBS HIGH 50: CPT | Performed by: STUDENT IN AN ORGANIZED HEALTH CARE EDUCATION/TRAINING PROGRAM

## 2024-09-20 PROCEDURE — 85610 PROTHROMBIN TIME: CPT

## 2024-09-20 PROCEDURE — 85027 COMPLETE CBC AUTOMATED: CPT

## 2024-09-20 PROCEDURE — 1170000001 HC PRIVATE ONCOLOGY ROOM DAILY

## 2024-09-20 PROCEDURE — 2500000004 HC RX 250 GENERAL PHARMACY W/ HCPCS (ALT 636 FOR OP/ED)

## 2024-09-20 PROCEDURE — 99233 SBSQ HOSP IP/OBS HIGH 50: CPT

## 2024-09-20 PROCEDURE — 2500000001 HC RX 250 WO HCPCS SELF ADMINISTERED DRUGS (ALT 637 FOR MEDICARE OP)

## 2024-09-20 PROCEDURE — 36415 COLL VENOUS BLD VENIPUNCTURE: CPT

## 2024-09-20 PROCEDURE — 85730 THROMBOPLASTIN TIME PARTIAL: CPT

## 2024-09-20 PROCEDURE — 83615 LACTATE (LD) (LDH) ENZYME: CPT

## 2024-09-20 PROCEDURE — 80053 COMPREHEN METABOLIC PANEL: CPT

## 2024-09-20 PROCEDURE — 85045 AUTOMATED RETICULOCYTE COUNT: CPT

## 2024-09-20 PROCEDURE — 97530 THERAPEUTIC ACTIVITIES: CPT | Mod: GP

## 2024-09-20 RX ORDER — POTASSIUM CHLORIDE 14.9 MG/ML
20 INJECTION INTRAVENOUS
Status: COMPLETED | OUTPATIENT
Start: 2024-09-20 | End: 2024-09-20

## 2024-09-20 RX ORDER — TRIAMCINOLONE ACETONIDE 1 MG/G
CREAM TOPICAL 2 TIMES DAILY
Status: DISPENSED | OUTPATIENT
Start: 2024-09-20

## 2024-09-20 RX ORDER — NYSTATIN 100000 [USP'U]/G
1 POWDER TOPICAL 2 TIMES DAILY
Status: DISPENSED | OUTPATIENT
Start: 2024-09-20

## 2024-09-20 ASSESSMENT — COGNITIVE AND FUNCTIONAL STATUS - GENERAL
MOVING TO AND FROM BED TO CHAIR: A LOT
CLIMB 3 TO 5 STEPS WITH RAILING: TOTAL
TURNING FROM BACK TO SIDE WHILE IN FLAT BAD: A LOT
WALKING IN HOSPITAL ROOM: TOTAL
STANDING UP FROM CHAIR USING ARMS: A LOT
MOBILITY SCORE: 11
MOVING FROM LYING ON BACK TO SITTING ON SIDE OF FLAT BED WITH BEDRAILS: A LITTLE

## 2024-09-20 ASSESSMENT — PAIN - FUNCTIONAL ASSESSMENT
PAIN_FUNCTIONAL_ASSESSMENT: 0-10

## 2024-09-20 ASSESSMENT — PAIN DESCRIPTION - DESCRIPTORS: DESCRIPTORS: ACHING

## 2024-09-20 ASSESSMENT — PAIN SCALES - GENERAL
PAINLEVEL_OUTOF10: 8
PAINLEVEL_OUTOF10: 8
PAINLEVEL_OUTOF10: 7
PAINLEVEL_OUTOF10: 7
PAINLEVEL_OUTOF10: 8

## 2024-09-20 NOTE — CARE PLAN
The patient's goals for the shift include      The clinical goals for the shift include patient will remain safe and free from injury this shift 9/20/24 0700      Problem: Pain - Adult  Goal: Verbalizes/displays adequate comfort level or baseline comfort level  Outcome: Progressing     Problem: Safety - Adult  Goal: Free from fall injury  Outcome: Progressing     Problem: Skin  Goal: Decreased wound size/increased tissue granulation at next dressing change  Outcome: Progressing  Goal: Participates in plan/prevention/treatment measures  Outcome: Progressing  Goal: Prevent/manage excess moisture  Outcome: Progressing  Flowsheets (Taken 9/19/2024 2146)  Prevent/manage excess moisture: Cleanse incontinence/protect with barrier cream  Goal: Prevent/minimize sheer/friction injuries  Outcome: Progressing  Goal: Promote/optimize nutrition  Outcome: Progressing  Goal: Promote skin healing  Outcome: Progressing

## 2024-09-20 NOTE — PROGRESS NOTES
DERMATOLOGY CONSULT PROGRESS NOTE  Name: Senait Narvaez  MRN: [id number]  : 1970    Subjective    Patient is sitting comfortably in her room. She denies any new lesions or increased tenderness of the lesions.      Objective    Vitals:    24 1021 24 1231 24 1331 24 1452   BP: 110/65 107/69  112/71   BP Location:  Left arm  Left arm   Patient Position:  Lying  Lying   Pulse:  66 79 78   Resp:    16   Temp:  36 °C (96.8 °F)  36.8 °C (98.2 °F)   TempSrc:    Temporal   SpO2:  94% 99% 99%   Weight:       Height:          Exam    Physical Exam   GEN: no acute distress  NEURO: moving all extremities   EYES: conjunctiva and eyelids normal. No conjunctival injection or erosions appreciated  ENT:   - Lips: normal  - Teeth/gums: normal  - Oropharynx: normal tongue and mucosa  NECK: normal and symmetric.   CV: no varicosities, warmth or tenderness of extremities.  GI: Flat abdomen. Non-tender. No hepatosplenomegaly.    EXTREMITIES: no distal digital clubbing, cyanosis, petechiae   SKIN: A full body skin exam including scalp, face, eyes, ears, neck, trunk, bilateral upper & lower extremities, toenails and fingernails were examined with the following findings:  -Purple-black plaques in a retiform pattern on the forehead, feet, upper thighs, flank, and breasts, some with adjacent bullae. No progression since previous exam, some with improvement on the forehead.   -Reported moist uncomfortable rash under the breasts     Medications:  Scheduled Meds: acetaminophen, 975 mg, oral, q12h  bisacodyl, 10 mg, rectal, Daily  diphenhydramine-zinc acetate, , Topical, TID  folic acid, 1 mg, oral, Daily  metoprolol tartrate, 12.5 mg, oral, BID  nystatin, 1 Application, Topical, BID  pantoprazole, 40 mg, intravenous, Daily  polyethylene glycol, 17 g, oral, BID  sennosides, 2 tablet, oral, BID  thiamine, 100 mg, intravenous, Daily  triamcinolone, , Topical, BID  [Held by provider] warfarin, 5 mg, oral, Daily        Continuous Infusions: [Held by provider] bivalirudin, 0.05-0.49 mg/kg/hr, Last Rate: Stopped (09/20/24 1245)       PRN Meds: PRN medications: albuterol, alteplase, dextrose, dextrose, glucagon, glucagon, HYDROmorphone, ondansetron, oxyCODONE, oxygen     Results:  Results for orders placed or performed during the hospital encounter of 09/10/24 (from the past 24 hour(s))   Lactate dehydrogenase   Result Value Ref Range     (H) 84 - 246 U/L   Reticulocytes   Result Value Ref Range    Retic % 17.1 (H) 0.5 - 2.0 %    Retic Absolute 0.480 (H) 0.018 - 0.083 x10*6/uL    Reticulocyte Hemoglobin 33 28 - 38 pg    Immature Retic fraction 22.3 (H) <=16.0 %   CBC   Result Value Ref Range    WBC 19.6 (H) 4.4 - 11.3 x10*3/uL    nRBC 31.0 (H) 0.0 - 0.0 /100 WBCs    RBC 2.81 (L) 4.00 - 5.20 x10*6/uL    Hemoglobin 8.7 (L) 12.0 - 16.0 g/dL    Hematocrit 25.7 (L) 36.0 - 46.0 %    MCV 92 80 - 100 fL    MCH 31.0 26.0 - 34.0 pg    MCHC 33.9 32.0 - 36.0 g/dL    RDW 21.5 (H) 11.5 - 14.5 %    Platelets 373 150 - 450 x10*3/uL   Comprehensive metabolic panel   Result Value Ref Range    Glucose 72 (L) 74 - 99 mg/dL    Sodium 142 136 - 145 mmol/L    Potassium 3.4 (L) 3.5 - 5.3 mmol/L    Chloride 105 98 - 107 mmol/L    Bicarbonate 29 21 - 32 mmol/L    Anion Gap 11 10 - 20 mmol/L    Urea Nitrogen 47 (H) 6 - 23 mg/dL    Creatinine 1.79 (H) 0.50 - 1.05 mg/dL    eGFR 33 (L) >60 mL/min/1.73m*2    Calcium 8.8 8.6 - 10.6 mg/dL    Albumin 2.7 (L) 3.4 - 5.0 g/dL    Alkaline Phosphatase 172 (H) 33 - 110 U/L    Total Protein 5.8 (L) 6.4 - 8.2 g/dL    AST 35 9 - 39 U/L    Bilirubin, Total 3.9 (H) 0.0 - 1.2 mg/dL     (H) 7 - 45 U/L   Protime-INR   Result Value Ref Range    Protime 43.8 (H) 9.8 - 12.8 seconds    INR 3.8 (H) 0.9 - 1.1   Magnesium   Result Value Ref Range    Magnesium 1.97 1.60 - 2.40 mg/dL   aPTT   Result Value Ref Range    aPTT 68 (H) 27 - 38 seconds   CBC   Result Value Ref Range    WBC 23.2 (H) 4.4 - 11.3 x10*3/uL    nRBC 28.0  (H) 0.0 - 0.0 /100 WBCs    RBC 3.39 (L) 4.00 - 5.20 x10*6/uL    Hemoglobin 10.4 (L) 12.0 - 16.0 g/dL    Hematocrit 32.1 (L) 36.0 - 46.0 %    MCV 95 80 - 100 fL    MCH 30.7 26.0 - 34.0 pg    MCHC 32.4 32.0 - 36.0 g/dL    RDW 22.4 (H) 11.5 - 14.5 %    Platelets 299 150 - 450 x10*3/uL   Protime-INR   Result Value Ref Range    Protime 42.4 (H) 9.8 - 12.8 seconds    INR 3.7 (H) 0.9 - 1.1     *Note: Due to a large number of results and/or encounters for the requested time period, some results have not been displayed. A complete set of results can be found in Results Review.        Assessment/Plan   Senait Narvaez is a 53 year-old female currently in the MICU for multi-organ failure likely secondary to vaso-occlusive crisis related to her Hb SC disease vs sepsis. She has a significant hematologic history including regular exchange transfusions, recurrent DVT/PE on lifelong warfarin, pulmonary HTN, cauda equina, Raynaud's disease, and CKD II. Dermatology was consulted for new rash.     Differential diagnoses: Favor Multifocal Fixed Drug Eruption     Impression: Biopsies have returned showing a process that initially occurred but now already in stages of healing and re-epithelialization. With clinical correlation, the findings are most consistent with multifocal drug eruption. After review of medications patient was given previously, the most suspicious is vancomycin as the inciting trigger. It is notable that her rash has not progressed for several days, making a current medication less suspicious. This type of drug eruption also favors intertriginous areas, and proper barrier protection can be helpful for comfort of the patient.      Recommendations:  -Consider vancomycin to be added to allergy list, to avoid in the future   -START Triamcinolone 0.1% cream BID to affected areas   -START Interdry cloths to apply under the breasts       The patient was seen and discussed with attending physician Dr. Sands. The assessment and  plan was communicated to the care team.    Thank you for the consultation and for the opportunity to contribute to the care of this patient.      Aracely Rg,    PGY3, Dermatology  Epic chat (preferred)  Team pager [phone]     I saw and evaluated the patient. I personally obtained the key and critical portions of the history and physical exam or was physically present for key and critical portions performed by the resident. I reviewed the resident's documentation and discussed the patient with the resident. I agree with the resident's medical decision making as documented in the note.    Shad Sands MD

## 2024-09-20 NOTE — PROGRESS NOTES
Physical Therapy    Physical Therapy Treatment    Patient Name: Senait Narvaez  MRN: [id number]  Department: [facility]  Room: [facility]4024-A  Today's Date: 9/20/2024  Time Calculation  Start Time: 1331  Stop Time: 1406  Time Calculation (min): 35 min         Assessment/Plan   PT Assessment  PT Assessment Results: Decreased strength, Decreased endurance, Impaired balance, Decreased mobility, Orthopedic restrictions, Pain  Rehab Prognosis: Good  Barriers to Discharge: none  Evaluation/Treatment Tolerance: Patient limited by fatigue, Patient limited by pain  Medical Staff Made Aware: Yes  Strengths: Attitude of self, Coping skills  Barriers to Participation: Comorbidities  End of Session Communication: Bedside nurse  Assessment Comment: The pt presented with unsafe and unsteady transfers requiring max assistance using face-to-face HHA.  End of Session Patient Position: Bed, 3 rail up, Alarm off, caregiver present  PT Plan  Inpatient/Swing Bed or Outpatient: Inpatient  PT Plan  Treatment/Interventions: Bed mobility, Transfer training, Gait training, Balance training, Strengthening, Endurance training, Therapeutic exercise, Therapeutic activity, Home exercise program  PT Plan: Ongoing PT  PT Frequency: 3 times per week  PT Discharge Recommendations: Moderate intensity level of continued care  Equipment Recommended upon Discharge: Wheeled walker  PT Recommended Transfer Status: Assist x2  PT - OK to Discharge: Yes      General Visit Information:   PT  Visit  PT Received On: 09/20/24  Response to Previous Treatment: Patient reporting fatigue but able to participate.  General  Family/Caregiver Present: Yes  Caregiver Feedback: Mother and niece present with good support.  Prior to Session Communication: Bedside nurse  Patient Position Received: Up in chair, Alarm on  Preferred Learning Style: verbal, visual, written  General Comment: The pt was pleasant, cooperative and willing to participate in therapy.    Subjective    Precautions:  Precautions  Hearing/Visual Limitations: Hearing and vision WFL  Medical Precautions: Fall precautions, Oxygen therapy device and L/min  Precautions Comment: Pt in compliance with precautions throughout PT session.    Vital Signs (Past 2hrs)        Date/Time Vitals Session Patient Position Pulse Resp SpO2 BP MAP (mmHg)    09/20/24 1331 Pre PT  Sitting  79  --  99 %  --  --                   Vital Signs Comment: vitals stable     Objective   Pain:  Pain Assessment  Pain Assessment: 0-10  0-10 (Numeric) Pain Score: 8  Pain Type: Acute pain, Chronic pain  Pain Location: Generalized  Pain Descriptors: Aching  Pain Frequency: Constant/continuous  Pain Onset: Ongoing  Clinical Progression: Not changed  Patient's Stated Pain Goal: No pain  Pain Interventions: Therapeutic presence  Response to Interventions: Pain stable  Cognition:  Cognition  Overall Cognitive Status: Within Functional Limits  Orientation Level: Oriented X4  Coordination:  Movements are Fluid and Coordinated: Yes  Coordination Comment: WFL  Postural Control:  Postural Control  Postural Control: Impaired  Posture Comment: Pt presented with good sitting posture and impaired standing posture using face-to-face HHA.  Static Sitting Balance  Static Sitting-Balance Support: Bilateral upper extremity supported, Feet supported  Static Sitting-Level of Assistance: Distant supervision  Static Sitting-Comment/Number of Minutes: Sitting EOB  Dynamic Sitting Balance  Dynamic Sitting-Balance Support: Bilateral upper extremity supported, Feet supported  Dynamic Sitting-Level of Assistance: Distant supervision  Dynamic Sitting-Comments: Sitting EOB  Static Standing Balance  Static Standing-Balance Support: Bilateral upper extremity supported  Static Standing-Level of Assistance: Maximum assistance  Static Standing-Comment/Number of Minutes: using face-to-face HHA  Dynamic Standing Balance  Dynamic Standing-Balance Support: Bilateral upper extremity  supported  Dynamic Standing-Level of Assistance: Maximum assistance  Dynamic Standing-Comments: using face-to-face HHA  Extremity/Trunk Assessments:     RUE   RUE : Within Functional Limits  LUE   LUE: Within Functional Limits  RLE   RLE : Exceptions to WFL  AROM RLE (degrees)  RLE AROM Comment: WFL  Strength RLE  R Hip Flexion: 3/5  R Knee Flexion: 3+/5  R Knee Extension: 3+/5  R Ankle Dorsiflexion: 4/5  R Ankle Plantar Flexion: 4/5  LLE   LLE : Exceptions to WFL  AROM LLE (degrees)  LLE AROM Comment: WFL  Strength LLE  L Hip Flexion: 3/5  L Knee Flexion: 3+/5  L Knee Extension: 3+/5  L Ankle Dorsiflexion: 4/5  L Ankle Plantar Flexion: 4/5  Activity Tolerance:  Activity Tolerance  Endurance: Decreased tolerance for upright activites  Treatments:  Therapeutic Exercise  Therapeutic Exercise Performed: No    Therapeutic Activity  Therapeutic Activity Performed: Yes  Therapeutic Activity 1: Pt performed bed to chair transfer using face-to-face HHA.    Balance/Neuromuscular Re-Education  Balance/Neuromuscular Re-Education Activity Performed: Yes  Balance/Neuromuscular Re-Education Activity 1: Max assist static standing balance using face-to-face HHA.  Balance/Neuromuscular Re-Education Activity 2: Max assist dynamic standing balance using face-to-face HHA.    Bed Mobility  Bed Mobility: Yes  Bed Mobility 1  Bed Mobility 1: Sitting to supine  Level of Assistance 1: Moderate assistance  Bed Mobility Comments 1: log roll technique    Ambulation/Gait Training  Ambulation/Gait Training Performed: No  Transfers  Transfer: Yes  Transfer 1  Transfer From 1: Sit to  Transfer to 1: Stand  Transfer Device 1:  (no device)  Transfer Level of Assistance 1: Maximum assistance  Trials/Comments 1: using face-to-face HHA  Transfers 2  Transfer From 2: Stand to  Transfer to 2: Sit  Transfer Device 2:  (no device)  Transfer Level of Assistance 2: Maximum assistance  Trials/Comments 2: using face-to-face HHA  Transfers 3  Transfer From 3:  Chair with arms to  Transfer to 3: Bed  Transfer Device 3:  (no device)  Transfer Level of Assistance 3: Maximum assistance  Trials/Comments 3: using face-to-face HHA    Stairs  Stairs: No    Outcome Measures:  [facility] Basic Mobility  Turning from your back to your side while in a flat bed without using bedrails: A little  Moving from lying on your back to sitting on the side of a flat bed without using bedrails: A lot  Moving to and from bed to chair (including a wheelchair): A lot  Standing up from a chair using your arms (e.g. wheelchair or bedside chair): A lot  To walk in hospital room: Total  Climbing 3-5 steps with railing: Total  Basic Mobility - Total Score: 11    OP EDUCATION:  Outpatient Education  Individual(s) Educated: Patient, Other  Education Provided: Body Mechanics, Fall Risk, Home Safety, POC, Post-Op Precautions, Posture  Patient Response to Education: Patient/Caregiver Verbalized Understanding of Information, Patient/Caregiver Performed Return Demonstration of Exercises/Activities    Encounter Problems       Encounter Problems (Active)       PT Problem       Patient will complete supine to sit and sit to supine Independent  (Progressing)       Start:  09/17/24    Expected End:  10/01/24            Patient will perform sit<>stand transfer with LRAD, and Modified Independent  (Progressing)       Start:  09/17/24    Expected End:  10/01/24            Patient will ambulate >150' with LRAD and Modified Independent  (Progressing)       Start:  09/17/24    Expected End:  10/01/24            Patient will complete Tinetti Balance Assesment with a score equal to or better than 18 to reduce falls risk.    (Progressing)       Start:  09/17/24    Expected End:  10/01/24               Pain - Adult

## 2024-09-20 NOTE — PROGRESS NOTES
Spiritual Care Visit      introduced self and spiritual care services to patient and patient's mother, explaining services available and checking in to see how patient is doing today. She expressed starting to feel a little better and hoping to get home soon.  spent time speaking with them about their family's strong komal, her mother's work as a  and her full-time job working for the [employer]. Patient and mother were grateful for the visit and welcomed support any time the patient is at [facility].     Spiritual Care will continue to follow and provide support. Please reach out with any needs/concerns.     Rev. Dev Cheema, Supportive Oncology

## 2024-09-20 NOTE — PROGRESS NOTES
"Senait Narvaez is a 54 y.o. female on day 10 of admission presenting with No Principal Problem: There is no principal problem currently on the Problem List. Please update the Problem List and refresh..    Subjective   Senait is doing fine this morning. ORTIZ. Seen at bedside, reports still having pain from rash. Denies N/V/D/C, chest pain, cough, shortness of breath.  Pain right now is 7/10, will ask for pain meds. Rash is about the same today per pt. She seems tired today but states she feels at her baseline.        Objective     Physical Exam  Constitutional:       General: She is not in acute distress.     Appearance: She is not toxic-appearing.   HENT:      Head: Normocephalic and atraumatic.   Eyes:      General: No scleral icterus.     Extraocular Movements: Extraocular movements intact.   Cardiovascular:      Rate and Rhythm: Normal rate and regular rhythm.   Pulmonary:      Effort: No respiratory distress.   Abdominal:      Tenderness: There is no abdominal tenderness.   Musculoskeletal:         General: No swelling or tenderness.   Skin:     Coloration: Skin is not jaundiced or pale.      Findings: Rash (purpuric, + bullae) present. No bruising.   Neurological:      Mental Status: She is oriented to person, place, and time. Mental status is at baseline.         Last Recorded Vitals  Blood pressure 107/69, pulse 66, temperature 36 °C (96.8 °F), resp. rate 16, height 1.651 m (5' 5\"), weight 120 kg (264 lb 12.4 oz), SpO2 94%.  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 Patient was subsequently intubated due to worsening lethargic and lack of respiratory compensation from significant metabolic acidosis. Vancomycin and Zosyn started for empiric coverage, patient also started on pressors for BP support. Patient was  transfused 2 units pRBCs and then underwent RBCEx on 9/11. Cardiology consulted for c/f NSTEMI vs demand ischemia (ST depressions and elevated troponin), rec treat as ACS. Course further c/b severe CHARLES with peak sCr of 5.4, and acute liver injury with severely elevated liver enzymes (ALT 4119, AST >10k). Liver US only remarkable for fatty infiltration, viral hepatitis panel negative. CHARLES and liver injury improved with supportive management. Pruritic rash noted 9/14, Derm consulted and took multiple biopsies 9/15. Rash then began to worsen to involve the groin, lateral thigh, and scalp with numerous tense bullae. Discontinued heparin given concerns for potential HIT, started bivalirudin. Patient was extubated on 9/16/2024. Started stress dose steroids given continued pressor requirements, pressors Dc'd 9/17. Patient transferred to [facility] 9/17, PT/OT rec SNF. Coumadin bridge was started 9/18. Given persistent rash with unremarkable labs, derm re-biopsied on 9/18. INR 3.8, bival and home warfarin stopped, repeat INR pending. Dermatology contacted for bx results (9/20) pending recs. DC pending improved pain, remainder of derm w/u, and SNF placement.      #Purpuric rash  #Concerns for possible heparin-induced thrombocytopenia (HIT)  - around the bilateral breasts, scalp and groin, now worsening to include tense bullae  - Non-erythematous, no purulence. Dermatology consulted. Differential diagnosis includes purpura secondary to infection versus  coagulopathy versus vasculopathy versus small and/or medium vessel vasculitis versus calciphylaxis.  - Concerns for possible heparin-induced thrombocytopenia with concurrent skin findings. Discontinued heparin 9/16 and transitioned to bivalirudin  - 4 Ts score 0  - Low Protein C activity  - ANCA, PR3 and MPO negative  - PF4 with reflex to BINA negative   - s/p skin punch biopsies x 2 9/15 and one on 9/18, showed SUPERFICIAL EPIDERMAL DEGENERATION WITH ERYTHROCYTE EXTRAVASATION WITH NEUTROPHILS. These findings could be seen secondary to an older trauma, or resolving erythema multiforme, a fixed drug eruption, Edgar-Christopher syndrome, or toxic epidermal necrolysis. 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  # ACS  - Previously managed through routine RBC exchanges q6 weeks, most recent RBCEx 3/1/24, per patient pausing on transfusions for time being  - OARRS reviewed, no aberrant behavior noted  - LDH>12,000 (now downtrending), haptoglobin <30, fibrinogen 318, retic % 5.2 on presentation, bili 10.7  - leukocytosis 24.8 on admission to MICU  - CXR (9/10) persistent atelectasis/scarring in the left lower lung  - CT CAP (9/11) Diffuse mosaic attenuation of the lung parenchyma, which can be seen in the setting of small airway disease, pulmonary edema or acute infection  - intubated upon arrival to MICU, now s/p extubation 9/16  - started on vanc and zosyn (finished both courses prior to floor transfer)  - Procal elevated 7.09 (9/11)  - Blood cultures 9/10, 9/12, 9/15 all NGTD  - Strep and Legionella Ag, MRSA nares negative  - trop peak 1431, cards rec treat as ACS (see below)  - S/p 2u pRBCs 9/10 on arrival to MICU  - S/p exchange transfusion 9/11 AM for ACS  - Care plan from 12/6/23- For mild to moderate pain: Dilaudid 0.5mg IVP q3h as needed, for moderate to  severe pain Dilaudid 1- 1.5mg q3h PRN  - [facility] arrival to [facility], started on 0.6mg dilaudid IVP q3h PRN severe pain   - bowel regimen for opioid induced constipation, zofran for opioid induced nausea, and benadrly for opioid induced pruritus  - c/w home Duloxetine 40mg daily, PO Zofran PRN, Folic Acid 1mg daily, and MVI daily  - per Heme (9/19) Continue supportive care. No indication for repeat exchange at this time. PF4 assay negative, so low concern for HIT. Patient bridging to warfarin from bivalrudin      # ST depression with tropinemia   # Hx SVT  - Elevated troponins on admission, peak 1431 and now downtrending   - EKG with ST depressions, likely Type II MI due to vaso-occlusive crisis and anemia  - TTE 9/11: EF 60-65%, RV enlarged and reduced in function which appears stable when compared to last TTE (1/2024)  - Troponin leak is likely due to cardiac demand vs supply mismatch in the setting of worsening anemia and vaso-occlusive crisis  - Cardiology consulted, now signed off with indication to treat as ACS  - lasix held in MICU 2/2 hypotension and CHARLES, can resume as able   - Follows with Cardiology outpt, will request FUV closer to DC     # CHARLES on CKD II, improving  - Baseline ~ SCr <2, peak 5.4 this admission. 9/20 sCr 1.79  - likely mixed pre-renal/intra-renal, on CKD II, now polyuric phase  - FeNa 1.2%, consistent with ATN  - Severe hyperkalemia with previous peaked T waves on EKG, resolved  - Nephrology followed, now signed off, no need for HD  - s/p multiple K cocktails in MICU     # Acute liver injury, improving  # Direct hyperbilirubinuria, improving  - likely 2/2 hemodynamic instability versus sickle cell crisis  - On presentation to  MICU ALT 2611, AST 4727, T bili 10.2, INR 3.0, continues to improve  - CT with hepatic venous congestion, patient lacks gallbladder  - Liver US with Doppler: Diffuse fatty infiltration, unremarkable doppler interrogation of the liver  - EBV IgG positive, IgM negative  - CMV  IgG positive, PCR negative  - Acute hepatitis panel unremarkable  - Hepatology now signed off  - 9/20 tbili 3.9, AST 35, ,      # pHTN   - 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 Yi and Dr. 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 On lifelong anticoagulation, DOAC discouraged due to high risk of clotting   - home Coumadin 5mg daily initially HELD on admission to MICU 2/2 unstable course  - restarted coumadin 9/18 PM, will plan to bridge with bival, Ok'd by heme   - 9/20 INR 3.8, bival and home warfarin held. Pharmacy rec repeat INR 4 hours after bival was held. If the INR < 2, we can restart the bival, but if the INR 2-3, we can continue with warfarin monotherapy   - 9/20 repeat INR pending  - Follows with Coumadin clinic outpatient  - Caution with fluids      # CAN  - cont home CPAP   - cont home Albuterol PRN     # H/O Cauda Equine syndrome  - Follows Dr. Delacruz as outpatient  - no current issues      # DISPO  - Full code- confirmed on admission  - NOK: Tati Salgado (mother) (#[phone])  - PT/OT rec SNF, pending choice/placement  - FUV PCP 10/1, Anim 10/7, Cardiology 2/13     I spent >60 minutes in the professional and overall care of this  patient.    Assessment and plan discussed with attending physician Dr. Shellie Kruger PA-C

## 2024-09-21 ENCOUNTER — HOME HEALTH ADMISSION (OUTPATIENT)
Dept: HOME HEALTH SERVICES | Facility: HOME HEALTH | Age: 54
End: 2024-09-21
Payer: COMMERCIAL

## 2024-09-21 ENCOUNTER — APPOINTMENT (OUTPATIENT)
Dept: RADIOLOGY | Facility: HOSPITAL | Age: 54
End: 2024-09-21
Payer: COMMERCIAL

## 2024-09-21 ENCOUNTER — APPOINTMENT (OUTPATIENT)
Dept: RADIOLOGY | Facility: HOSPITAL | Age: 54
DRG: 811 | End: 2024-09-21
Payer: COMMERCIAL

## 2024-09-21 LAB
ALBUMIN SERPL BCP-MCNC: 2.8 G/DL (ref 3.4–5)
ALP SERPL-CCNC: 170 U/L (ref 33–110)
ALT SERPL W P-5'-P-CCNC: 98 U/L (ref 7–45)
ANION GAP SERPL CALC-SCNC: 11 MMOL/L (ref 10–20)
APPEARANCE UR: ABNORMAL
AST SERPL W P-5'-P-CCNC: 38 U/L (ref 9–39)
BILIRUB SERPL-MCNC: 3.7 MG/DL (ref 0–1.2)
BILIRUB UR STRIP.AUTO-MCNC: NEGATIVE MG/DL
BUN SERPL-MCNC: 41 MG/DL (ref 6–23)
CALCIUM SERPL-MCNC: 8.8 MG/DL (ref 8.6–10.6)
CHLORIDE SERPL-SCNC: 105 MMOL/L (ref 98–107)
CO2 SERPL-SCNC: 27 MMOL/L (ref 21–32)
COLOR UR: YELLOW
CREAT SERPL-MCNC: 1.58 MG/DL (ref 0.5–1.05)
EGFRCR SERPLBLD CKD-EPI 2021: 39 ML/MIN/1.73M*2
ERYTHROCYTE [DISTWIDTH] IN BLOOD BY AUTOMATED COUNT: 22.4 % (ref 11.5–14.5)
GLUCOSE SERPL-MCNC: 74 MG/DL (ref 74–99)
GLUCOSE UR STRIP.AUTO-MCNC: NORMAL MG/DL
HCT VFR BLD AUTO: 27.4 % (ref 36–46)
HGB BLD-MCNC: 8.4 G/DL (ref 12–16)
HGB RETIC QN: 32 PG (ref 28–38)
HOLD SPECIMEN: NORMAL
IMMATURE RETIC FRACTION: 22.1 %
INR PPP: 3.2 (ref 0.9–1.1)
KETONES UR STRIP.AUTO-MCNC: NEGATIVE MG/DL
LDH SERPL L TO P-CCNC: 341 U/L (ref 84–246)
LEUKOCYTE ESTERASE UR QL STRIP.AUTO: ABNORMAL
MAGNESIUM SERPL-MCNC: 1.89 MG/DL (ref 1.6–2.4)
MCH RBC QN AUTO: 31 PG (ref 26–34)
MCHC RBC AUTO-ENTMCNC: 30.7 G/DL (ref 32–36)
MCV RBC AUTO: 101 FL (ref 80–100)
NITRITE UR QL STRIP.AUTO: NEGATIVE
NRBC BLD-RTO: 18.5 /100 WBCS (ref 0–0)
PH UR STRIP.AUTO: 6 [PH]
PLATELET # BLD AUTO: 305 X10*3/UL (ref 150–450)
POTASSIUM SERPL-SCNC: 3.8 MMOL/L (ref 3.5–5.3)
PROT SERPL-MCNC: 5.9 G/DL (ref 6.4–8.2)
PROT UR STRIP.AUTO-MCNC: ABNORMAL MG/DL
PROTHROMBIN TIME: 36.5 SECONDS (ref 9.8–12.8)
RBC # BLD AUTO: 2.71 X10*6/UL (ref 4–5.2)
RBC # UR STRIP.AUTO: ABNORMAL /UL
RBC #/AREA URNS AUTO: >20 /HPF
RETICS #: 0.42 X10*6/UL (ref 0.02–0.08)
RETICS/RBC NFR AUTO: 15.6 % (ref 0.5–2)
SODIUM SERPL-SCNC: 139 MMOL/L (ref 136–145)
SP GR UR STRIP.AUTO: 1.01
SQUAMOUS #/AREA URNS AUTO: ABNORMAL /HPF
UROBILINOGEN UR STRIP.AUTO-MCNC: NORMAL MG/DL
WBC # BLD AUTO: 23 X10*3/UL (ref 4.4–11.3)
WBC #/AREA URNS AUTO: >50 /HPF
YEAST BUDDING #/AREA UR COMP ASSIST: PRESENT /HPF

## 2024-09-21 PROCEDURE — 81001 URINALYSIS AUTO W/SCOPE: CPT

## 2024-09-21 PROCEDURE — 2500000001 HC RX 250 WO HCPCS SELF ADMINISTERED DRUGS (ALT 637 FOR MEDICARE OP)

## 2024-09-21 PROCEDURE — 99233 SBSQ HOSP IP/OBS HIGH 50: CPT

## 2024-09-21 PROCEDURE — 71045 X-RAY EXAM CHEST 1 VIEW: CPT

## 2024-09-21 PROCEDURE — 71045 X-RAY EXAM CHEST 1 VIEW: CPT | Performed by: RADIOLOGY

## 2024-09-21 PROCEDURE — 36415 COLL VENOUS BLD VENIPUNCTURE: CPT

## 2024-09-21 PROCEDURE — 74018 RADEX ABDOMEN 1 VIEW: CPT

## 2024-09-21 PROCEDURE — 74018 RADEX ABDOMEN 1 VIEW: CPT | Performed by: RADIOLOGY

## 2024-09-21 PROCEDURE — 85610 PROTHROMBIN TIME: CPT

## 2024-09-21 PROCEDURE — 85027 COMPLETE CBC AUTOMATED: CPT

## 2024-09-21 PROCEDURE — 87086 URINE CULTURE/COLONY COUNT: CPT

## 2024-09-21 PROCEDURE — 80053 COMPREHEN METABOLIC PANEL: CPT

## 2024-09-21 PROCEDURE — 83735 ASSAY OF MAGNESIUM: CPT

## 2024-09-21 PROCEDURE — 87899 AGENT NOS ASSAY W/OPTIC: CPT

## 2024-09-21 PROCEDURE — 83615 LACTATE (LD) (LDH) ENZYME: CPT

## 2024-09-21 PROCEDURE — 87040 BLOOD CULTURE FOR BACTERIA: CPT

## 2024-09-21 PROCEDURE — 87081 CULTURE SCREEN ONLY: CPT

## 2024-09-21 PROCEDURE — 2500000004 HC RX 250 GENERAL PHARMACY W/ HCPCS (ALT 636 FOR OP/ED)

## 2024-09-21 PROCEDURE — 87449 NOS EACH ORGANISM AG IA: CPT

## 2024-09-21 PROCEDURE — 1170000001 HC PRIVATE ONCOLOGY ROOM DAILY

## 2024-09-21 PROCEDURE — 85045 AUTOMATED RETICULOCYTE COUNT: CPT

## 2024-09-21 RX ORDER — ACETAMINOPHEN 325 MG/1
975 TABLET ORAL EVERY 12 HOURS PRN
Status: DISPENSED | OUTPATIENT
Start: 2024-09-21

## 2024-09-21 ASSESSMENT — COGNITIVE AND FUNCTIONAL STATUS - GENERAL
MOBILITY SCORE: 8
TURNING FROM BACK TO SIDE WHILE IN FLAT BAD: A LOT
DAILY ACTIVITIY SCORE: 10
EATING MEALS: A LITTLE
TOILETING: A LOT
TOILETING: TOTAL
CLIMB 3 TO 5 STEPS WITH RAILING: TOTAL
DRESSING REGULAR UPPER BODY CLOTHING: A LOT
DRESSING REGULAR UPPER BODY CLOTHING: A LOT
MOBILITY SCORE: 9
STANDING UP FROM CHAIR USING ARMS: TOTAL
PERSONAL GROOMING: A LOT
PERSONAL GROOMING: A LOT
DAILY ACTIVITIY SCORE: 11
DRESSING REGULAR LOWER BODY CLOTHING: TOTAL
MOVING FROM LYING ON BACK TO SITTING ON SIDE OF FLAT BED WITH BEDRAILS: A LITTLE
HELP NEEDED FOR BATHING: TOTAL
DRESSING REGULAR LOWER BODY CLOTHING: TOTAL
MOVING TO AND FROM BED TO CHAIR: TOTAL
WALKING IN HOSPITAL ROOM: TOTAL
MOVING FROM LYING ON BACK TO SITTING ON SIDE OF FLAT BED WITH BEDRAILS: A LOT
STANDING UP FROM CHAIR USING ARMS: TOTAL
HELP NEEDED FOR BATHING: TOTAL
MOVING TO AND FROM BED TO CHAIR: TOTAL
CLIMB 3 TO 5 STEPS WITH RAILING: TOTAL
TURNING FROM BACK TO SIDE WHILE IN FLAT BAD: A LOT
WALKING IN HOSPITAL ROOM: TOTAL
EATING MEALS: A LITTLE

## 2024-09-21 ASSESSMENT — PAIN SCALES - GENERAL
PAINLEVEL_OUTOF10: 8
PAINLEVEL_OUTOF10: 7
PAINLEVEL_OUTOF10: 8
PAINLEVEL_OUTOF10: 7
PAINLEVEL_OUTOF10: 0 - NO PAIN
PAINLEVEL_OUTOF10: 8

## 2024-09-21 ASSESSMENT — PAIN - FUNCTIONAL ASSESSMENT
PAIN_FUNCTIONAL_ASSESSMENT: 0-10

## 2024-09-21 ASSESSMENT — PAIN SCALES - PAIN ASSESSMENT IN ADVANCED DEMENTIA (PAINAD): TOTALSCORE: MEDICATION (SEE MAR)

## 2024-09-21 NOTE — CARE PLAN
The patient's goals for the shift include      The clinical goals for the shift include patient will remain safe and free from injury this shift 9/21/24 0700      Problem: Pain - Adult  Goal: Verbalizes/displays adequate comfort level or baseline comfort level  Outcome: Progressing     Problem: Safety - Adult  Goal: Free from fall injury  Outcome: Progressing     Problem: Skin  Goal: Decreased wound size/increased tissue granulation at next dressing change  Outcome: Progressing  Goal: Participates in plan/prevention/treatment measures  Outcome: Progressing  Goal: Prevent/manage excess moisture  Outcome: Progressing  Flowsheets (Taken 9/20/2024 2228)  Prevent/manage excess moisture: Cleanse incontinence/protect with barrier cream  Goal: Prevent/minimize sheer/friction injuries  Outcome: Progressing  Goal: Promote/optimize nutrition  Outcome: Progressing  Goal: Promote skin healing  Outcome: Progressing

## 2024-09-21 NOTE — PROGRESS NOTES
"Senait Narvaez is a 54 y.o. female on day 11 of admission presenting with No Principal Problem: There is no principal problem currently on the Problem List. Please update the Problem List and refresh..    Subjective   Senait is doing fine this morning. She states her pain is the same today but endorses new abdominal pain. Pt states she is having regular Bms. We discussed getting some imaging today for an infectious workup and for abdominal pain. Denies CP, SOB, N/V/D/C, fever, chills. ROS: A complete review of systems was performed and is negative except for as mentioned above in the HPI        Objective     Physical Exam  Constitutional:       General: She is not in acute distress.     Appearance: She is not toxic-appearing.   HENT:      Head: Normocephalic and atraumatic.   Eyes:      General: No scleral icterus.     Extraocular Movements: Extraocular movements intact.   Cardiovascular:      Rate and Rhythm: Normal rate and regular rhythm.   Pulmonary:      Effort: No respiratory distress.   Abdominal:      Tenderness: There is no abdominal tenderness.   Musculoskeletal:         General: No swelling or tenderness.   Skin:     Coloration: Skin is not jaundiced or pale.      Findings: Rash (purpuric, + bullae) present. No bruising.   Neurological:      Mental Status: She is oriented to person, place, and time. Mental status is at baseline.         Last Recorded Vitals  Blood pressure 106/67, pulse 80, temperature 36.4 °C (97.5 °F), temperature source Temporal, resp. rate 16, height 1.651 m (5' 5\"), weight 120 kg (264 lb 12.4 oz), SpO2 94%.  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 Patient was subsequently intubated due to worsening lethargic and lack of respiratory compensation from significant metabolic acidosis. Vancomycin and Zosyn started for empiric coverage, patient also started on pressors for BP support. Patient was  transfused 2 units pRBCs and then underwent RBCEx on 9/11. Cardiology consulted for c/f NSTEMI vs demand ischemia (ST depressions and elevated troponin), rec treat as ACS. Course further c/b severe CHARLES with peak sCr of 5.4, and acute liver injury with severely elevated liver enzymes (ALT 4119, AST >10k). Liver US only remarkable for fatty infiltration, viral hepatitis panel negative. CHARLES and liver injury improved with supportive management. Pruritic rash noted 9/14, Derm consulted and took multiple biopsies 9/15. Rash then began to worsen to involve the groin, lateral thigh, and scalp with numerous tense bullae. Discontinued heparin given concerns for potential HIT, started bivalirudin. Patient was extubated on 9/16/2024. Started stress dose steroids given continued pressor requirements, pressors Dc'd 9/17. Patient transferred to [facility] 9/17, PT/OT rec SNF. Coumadin bridge was started 9/18. Given persistent rash with unremarkable labs, derm re-biopsied on 9/18. INR 3.8, bival and home warfarin stopped, repeat INR 9/21 3.2, per pharm recheck INR 9/22. Dermatology contacted for bx results (9/20) rec triamcinolone cream BID and interdry cloths. Leukocytosis uptrending 9/21, infectious workup started. DC pending improved pain and infectious workup.      #Purpuric rash  #Concerns for possible heparin-induced thrombocytopenia (HIT)  - around the bilateral  breasts, scalp and groin, now worsening to include tense bullae  - Non-erythematous, no purulence. Dermatology consulted. Differential diagnosis includes purpura secondary to infection versus coagulopathy versus vasculopathy versus small and/or medium vessel vasculitis versus calciphylaxis.  - Concerns for possible heparin-induced thrombocytopenia with concurrent skin findings. Discontinued heparin 9/16 and transitioned to bivalirudin  - 4 Ts score 0  - Low Protein C activity  - ANCA, PR3 and MPO negative  - ISABELLE neg  - PF4 with reflex to BINA negative   - s/p skin punch biopsies x 2 9/15 and one on 9/18, showed SUPERFICIAL EPIDERMAL DEGENERATION WITH ERYTHROCYTE EXTRAVASATION WITH NEUTROPHILS. These findings could be seen secondary to an older trauma, or resolving erythema multiforme, a fixed drug eruption, Edgar-Christopher syndrome, or toxic epidermal necrolysis. Favor Multifocal Fixed Drug Eruption   - Dermatology contacted (9/20) about results, Consider vancomycin to be added to allergy list, to avoid in the future, START Triamcinolone 0.1% cream BID to affected areas, START Interdry cloths to apply under the breasts  - Start triamcinolone 0.1% cream BID (9/20-current)  - cryoglobulin labs had not enough specimen available to analyze  - Plan for suture removal around 9/25-9/27    #Leukocytosis  - likely I/s/o rash vs reactive vs infectious process  - leukocytosis noted on admission to MICU (WBC 24.8)  - leukocytosis improved throughout hospital stay however started uptrending 9/20  - 9/21 WBC 23.0   - Blood cultures 9/10, 9/12, 9/15 all NGTD  - Strep and Legionella Ag, MRSA nares negative  - CXR (9/21) pending  - abdominal xray (9/21) pending  - repeat blood cultures x 2 (9/21) pending  - UA (9/21) pending     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  # ACS  - Previously managed through routine RBC exchanges q6 weeks, most recent RBCEx 3/1/24, per patient pausing on transfusions for time being  - OARRS reviewed, no aberrant  behavior noted  - LDH>12,000 (now downtrending), haptoglobin <30, fibrinogen 318, retic % 5.2 on presentation, bili 10.7  - leukocytosis 24.8 on admission to MICU  - CXR (9/10) persistent atelectasis/scarring in the left lower lung  - CT CAP (9/11) Diffuse mosaic attenuation of the lung parenchyma, which can be seen in the setting of small airway disease, pulmonary edema or acute infection  - intubated upon arrival to MICU, now s/p extubation 9/16  - started on vanc and zosyn (finished both courses prior to floor transfer)  - Procal elevated 7.09 (9/11)  - Blood cultures 9/10, 9/12, 9/15 all NGTD  - Strep and Legionella Ag, MRSA nares negative  - trop peak 1431, cards rec treat as ACS (see below)  - [facility]  - S/p exchange transfusion 9/11 AM for ACS  - Care plan from 12/6/23- For mild to moderate pain: Dilaudid 0.5mg IVP q3h as needed, for moderate to severe pain Dilaudid 1- 1.5mg q3h PRN  - On arrival to [facility], started on 0.6mg dilaudid IVP q3h PRN severe pain   - bowel regimen for opioid induced constipation, zofran for opioid induced nausea, and benadrly for opioid induced pruritus  - c/w home Duloxetine 40mg daily, PO Zofran PRN, Folic Acid 1mg daily, and MVI daily  - per Heme (9/19) Continue supportive care. No indication for repeat exchange at this time. PF4 assay negative, so low concern for HIT. Patient bridging to warfarin from bivalrudin. Hematology signed off     # ST depression with tropinemia   # Hx SVT  - Elevated troponins on admission, peak 1431 and now downtrending   - EKG with ST depressions, likely Type II MI due to vaso-occlusive crisis and anemia  - TTE 9/11: EF 60-65%, RV enlarged and reduced in function which appears stable when compared to last TTE (1/2024)  - Troponin leak is likely due to cardiac demand vs supply mismatch in the setting of worsening anemia and vaso-occlusive crisis  - Cardiology consulted, now signed off with indication to treat as ACS  - lasix  held in MICU 2/2 hypotension and CHARLES, can resume as able   - Follows with Cardiology outpt, FUV 2/13/25     # CHARLES on CKD II, improving  - Baseline ~ SCr <2, peak 5.4 this admission. 9/21 sCr 1.58  - likely mixed pre-renal/intra-renal, on CKD II, now polyuric phase  - FeNa 1.2%, consistent with ATN  - Severe hyperkalemia with previous peaked T waves on EKG, resolved  - Nephrology followed, now signed off, no need for HD  - s/p multiple K cocktails in MICU  - encourage increased PO intake     # Acute liver injury, improving  # Direct hyperbilirubinuria, improving  - likely 2/2 hemodynamic instability versus sickle cell crisis  - On presentation to  MICU ALT 2611, AST 4727, T bili 10.2, INR 3.0, continues to improve  - CT with hepatic venous congestion, patient lacks gallbladder  - Liver US with Doppler: Diffuse fatty infiltration, unremarkable doppler interrogation of the liver  - EBV IgG positive, IgM negative  - CMV IgG positive, PCR negative  - Acute hepatitis panel unremarkable  - Hepatology now signed off  - 9/21 tbili 3.7, AST 38, ALT 98,      # pHTN   - 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 Yi and Dr. 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 On lifelong  anticoagulation, DOAC discouraged due to high risk of clotting   - home Coumadin 5mg daily initially HELD on admission to MICU 2/2 unstable course  - restarted coumadin 9/18 PM, will plan to bridge with bival, Ok'd by heme   - 9/20 INR 3.8, bival and home warfarin held. Pharmacy rec repeat INR 4 hours after bival was held. If the INR < 2, we can restart the bival, but if the INR 2-3, we can continue with warfarin monotherapy   - 9/20 repeat INR 3.7, pharmacy rec continuing to hold both medications  - 9/21 INR 3.2, per pharmacy - get repeat INR in AM 9/22, if INR between 2-3 can restart home warfarin 5mg daily  - Follows with Coumadin clinic outpatient  - Caution with fluids      # CAN  - cont home CPAP   - cont home Albuterol PRN     # H/O Cauda Equine syndrome  - Follows Dr. Delarcuz as outpatient  - no current issues      # DISPO  - Full code- confirmed on admission  - NOK: Tati Salgado (mother) (#[phone])  - PT/OT rec SNF, pending choice/placement  - FUV PCP 10/1, Anim 10/7, Cardiology 2/13     I spent >60 minutes in the professional and overall care of this patient.    Assessment and plan discussed with attending physician Dr. Shellie Kruger PA-C

## 2024-09-22 LAB
ALBUMIN SERPL BCP-MCNC: 2.8 G/DL (ref 3.4–5)
ALP SERPL-CCNC: 192 U/L (ref 33–110)
ALT SERPL W P-5'-P-CCNC: 79 U/L (ref 7–45)
ANION GAP SERPL CALC-SCNC: 12 MMOL/L (ref 10–20)
AST SERPL W P-5'-P-CCNC: 42 U/L (ref 9–39)
ATRIAL RATE: 69 BPM
BACTERIA BLD CULT: NORMAL
BASO STIPL BLD QL SMEAR: PRESENT
BASOPHILS # BLD MANUAL: 0 X10*3/UL (ref 0–0.1)
BASOPHILS NFR BLD MANUAL: 0 %
BILIRUB SERPL-MCNC: 3.2 MG/DL (ref 0–1.2)
BUN SERPL-MCNC: 27 MG/DL (ref 6–23)
CALCIUM SERPL-MCNC: 8.9 MG/DL (ref 8.6–10.6)
CHLORIDE SERPL-SCNC: 105 MMOL/L (ref 98–107)
CO2 SERPL-SCNC: 28 MMOL/L (ref 21–32)
CREAT SERPL-MCNC: 1.09 MG/DL (ref 0.5–1.05)
EGFRCR SERPLBLD CKD-EPI 2021: 60 ML/MIN/1.73M*2
EOSINOPHIL # BLD MANUAL: 0 X10*3/UL (ref 0–0.7)
EOSINOPHIL NFR BLD MANUAL: 0 %
ERYTHROCYTE [DISTWIDTH] IN BLOOD BY AUTOMATED COUNT: 21 % (ref 11.5–14.5)
GLUCOSE SERPL-MCNC: 85 MG/DL (ref 74–99)
HCT VFR BLD AUTO: 28.5 % (ref 36–46)
HGB BLD-MCNC: 8.9 G/DL (ref 12–16)
HGB RETIC QN: 31 PG (ref 28–38)
HYPOCHROMIA BLD QL SMEAR: ABNORMAL
IMM GRANULOCYTES # BLD AUTO: ABNORMAL 10*3/UL
IMM GRANULOCYTES NFR BLD AUTO: ABNORMAL %
IMMATURE RETIC FRACTION: 20.5 %
INR PPP: 3 (ref 0.9–1.1)
LDH SERPL L TO P-CCNC: 344 U/L (ref 84–246)
LEGIONELLA AG UR QL: NEGATIVE
LYMPHOCYTES # BLD MANUAL: 0.74 X10*3/UL (ref 1.2–4.8)
LYMPHOCYTES NFR BLD MANUAL: 4.2 %
MAGNESIUM SERPL-MCNC: 1.69 MG/DL (ref 1.6–2.4)
MCH RBC QN AUTO: 29.9 PG (ref 26–34)
MCHC RBC AUTO-ENTMCNC: 31.2 G/DL (ref 32–36)
MCV RBC AUTO: 96 FL (ref 80–100)
MONOCYTES # BLD MANUAL: 0.6 X10*3/UL (ref 0.1–1)
MONOCYTES NFR BLD MANUAL: 3.4 %
NEUTS SEG # BLD MANUAL: 16.2 X10*3/UL (ref 1.2–7)
NEUTS SEG NFR BLD MANUAL: 91.5 %
NRBC BLD MANUAL-RTO: 24.6 % (ref 0–0)
NRBC BLD-RTO: 14 /100 WBCS (ref 0–0)
P AXIS: 68 DEGREES
P OFFSET: 175 MS
P ONSET: 117 MS
PAPPENHEIMER BOD BLD QL SMEAR: PRESENT
PLATELET # BLD AUTO: 449 X10*3/UL (ref 150–450)
POLYCHROMASIA BLD QL SMEAR: ABNORMAL
POTASSIUM SERPL-SCNC: 3.7 MMOL/L (ref 3.5–5.3)
PR INTERVAL: 182 MS
PROT SERPL-MCNC: 6.2 G/DL (ref 6.4–8.2)
PROTHROMBIN TIME: 34.1 SECONDS (ref 9.8–12.8)
Q ONSET: 208 MS
QRS COUNT: 11 BEATS
QRS DURATION: 96 MS
QT INTERVAL: 440 MS
QTC CALCULATION(BAZETT): 471 MS
QTC FREDERICIA: 461 MS
R AXIS: 61 DEGREES
RBC # BLD AUTO: 2.98 X10*6/UL (ref 4–5.2)
RBC MORPH BLD: ABNORMAL
RETICS #: 0.39 X10*6/UL (ref 0.02–0.08)
RETICS/RBC NFR AUTO: 13.1 % (ref 0.5–2)
S PNEUM AG UR QL: NEGATIVE
SODIUM SERPL-SCNC: 141 MMOL/L (ref 136–145)
T AXIS: -11 DEGREES
T OFFSET: 428 MS
TARGETS BLD QL SMEAR: ABNORMAL
TOTAL CELLS COUNTED BLD: 118
VARIANT LYMPHS # BLD MANUAL: 0.16 X10*3/UL (ref 0–0.5)
VARIANT LYMPHS NFR BLD: 0.9 %
VENTRICULAR RATE: 69 BPM
WBC # BLD AUTO: 17.7 X10*3/UL (ref 4.4–11.3)

## 2024-09-22 PROCEDURE — 2500000004 HC RX 250 GENERAL PHARMACY W/ HCPCS (ALT 636 FOR OP/ED)

## 2024-09-22 PROCEDURE — 80053 COMPREHEN METABOLIC PANEL: CPT

## 2024-09-22 PROCEDURE — 85027 COMPLETE CBC AUTOMATED: CPT

## 2024-09-22 PROCEDURE — 2500000001 HC RX 250 WO HCPCS SELF ADMINISTERED DRUGS (ALT 637 FOR MEDICARE OP)

## 2024-09-22 PROCEDURE — 83735 ASSAY OF MAGNESIUM: CPT

## 2024-09-22 PROCEDURE — 1170000001 HC PRIVATE ONCOLOGY ROOM DAILY

## 2024-09-22 PROCEDURE — 85610 PROTHROMBIN TIME: CPT

## 2024-09-22 PROCEDURE — 85007 BL SMEAR W/DIFF WBC COUNT: CPT

## 2024-09-22 PROCEDURE — 83615 LACTATE (LD) (LDH) ENZYME: CPT

## 2024-09-22 PROCEDURE — 99233 SBSQ HOSP IP/OBS HIGH 50: CPT

## 2024-09-22 PROCEDURE — 85045 AUTOMATED RETICULOCYTE COUNT: CPT

## 2024-09-22 PROCEDURE — 36415 COLL VENOUS BLD VENIPUNCTURE: CPT

## 2024-09-22 ASSESSMENT — PAIN DESCRIPTION - LOCATION
LOCATION: GENERALIZED

## 2024-09-22 ASSESSMENT — COGNITIVE AND FUNCTIONAL STATUS - GENERAL
DAILY ACTIVITIY SCORE: 11
EATING MEALS: A LITTLE
MOVING FROM LYING ON BACK TO SITTING ON SIDE OF FLAT BED WITH BEDRAILS: A LOT
TURNING FROM BACK TO SIDE WHILE IN FLAT BAD: A LOT
DRESSING REGULAR UPPER BODY CLOTHING: A LOT
HELP NEEDED FOR BATHING: TOTAL
MOVING TO AND FROM BED TO CHAIR: TOTAL
CLIMB 3 TO 5 STEPS WITH RAILING: TOTAL
DRESSING REGULAR LOWER BODY CLOTHING: TOTAL
WALKING IN HOSPITAL ROOM: TOTAL
PERSONAL GROOMING: A LOT
STANDING UP FROM CHAIR USING ARMS: TOTAL
TOILETING: A LOT
MOBILITY SCORE: 8

## 2024-09-22 ASSESSMENT — PAIN SCALES - GENERAL
PAINLEVEL_OUTOF10: 9
PAINLEVEL_OUTOF10: 8
PAINLEVEL_OUTOF10: 6
PAINLEVEL_OUTOF10: 0 - NO PAIN
PAINLEVEL_OUTOF10: 8
PAINLEVEL_OUTOF10: 5 - MODERATE PAIN
PAINLEVEL_OUTOF10: 4
PAINLEVEL_OUTOF10: 8

## 2024-09-22 ASSESSMENT — PAIN - FUNCTIONAL ASSESSMENT
PAIN_FUNCTIONAL_ASSESSMENT: 0-10

## 2024-09-22 NOTE — CARE PLAN
Patient will remain HDS and VSS by 9/22/24 at 0700    Problem: Pain - Adult  Goal: Verbalizes/displays adequate comfort level or baseline comfort level  Outcome: Progressing     Problem: Safety - Adult  Goal: Free from fall injury  Outcome: Progressing     Problem: Skin  Goal: Decreased wound size/increased tissue granulation at next dressing change  Outcome: Progressing  Flowsheets (Taken 9/22/2024 0512)  Decreased wound size/increased tissue granulation at next dressing change: Promote sleep for wound healing  Goal: Participates in plan/prevention/treatment measures  Outcome: Progressing  Flowsheets (Taken 9/17/2024 0707 by Darrel Pearson, RN)  Participates in plan/prevention/treatment measures: Elevate heels  Goal: Prevent/manage excess moisture  Outcome: Progressing  Flowsheets (Taken 9/20/2024 2228 by Keiln Rivera RN)  Prevent/manage excess moisture: Cleanse incontinence/protect with barrier cream  Goal: Prevent/minimize sheer/friction injuries  Outcome: Progressing  Flowsheets (Taken 9/22/2024 0512)  Prevent/minimize sheer/friction injuries: HOB 30 degrees or less  Goal: Promote/optimize nutrition  Outcome: Progressing  Flowsheets (Taken 9/22/2024 0512)  Promote/optimize nutrition: Monitor/record intake including meals  Goal: Promote skin healing  Outcome: Progressing  Flowsheets (Taken 9/22/2024 0512)  Promote skin healing: Assess skin/pad under line(s)/device(s)     Problem: Knowledge Deficit  Goal: Patient/family/caregiver demonstrates understanding of disease process, treatment plan, medications, and discharge instructions  Outcome: Progressing     Problem: Pain  Goal: Takes deep breaths with improved pain control throughout the shift  Outcome: Progressing  Goal: Turns in bed with improved pain control throughout the shift  Outcome: Progressing  Goal: Walks with improved pain control throughout the shift  Outcome: Progressing  Goal: Performs ADL's with improved pain control throughout shift  Outcome:  Progressing  Goal: Participates in PT with improved pain control throughout the shift  Outcome: Progressing  Goal: Free from opioid side effects throughout the shift  Outcome: Progressing  Goal: Free from acute confusion related to pain meds throughout the shift  Outcome: Progressing     Problem: Nutrition  Goal: Less than 5 days NPO/clear liquids  Outcome: Progressing  Goal: Oral intake greater than 50%  Outcome: Progressing  Goal: Oral intake greater 75%  Outcome: Progressing  Goal: Consume prescribed supplement  Outcome: Progressing  Goal: Adequate PO fluid intake  Outcome: Progressing  Goal: Nutrition support goals are met within 48 hrs  Outcome: Progressing  Goal: Nutrition support is meeting 75% of nutrient needs  Outcome: Progressing  Goal: Tube feed tolerance  Outcome: Progressing  Goal: BG  mg/dL  Outcome: Progressing  Goal: Lab values WNL  Outcome: Progressing  Goal: Electrolytes WNL  Outcome: Progressing  Goal: Promote healing  Outcome: Progressing  Goal: Maintain stable weight  Outcome: Progressing  Goal: Reduce weight from edema/fluid  Outcome: Progressing  Goal: Gradual weight gain  Outcome: Progressing  Goal: Improve ostomy output  Outcome: Progressing     Problem: Fall/Injury  Goal: Not fall by end of shift  Outcome: Progressing  Goal: Be free from injury by end of the shift  Outcome: Progressing  Goal: Verbalize understanding of personal risk factors for fall in the hospital  Outcome: Progressing  Goal: Verbalize understanding of risk factor reduction measures to prevent injury from fall in the home  Outcome: Progressing  Goal: Use assistive devices by end of the shift  Outcome: Progressing  Goal: Pace activities to prevent fatigue by end of the shift  Outcome: Progressing

## 2024-09-22 NOTE — PROGRESS NOTES
Senait Narvaez is a 54 y.o. female on day 12 of admission presenting with Sickle cell disease with crisis and other complication (Multi).      Subjective   NAEO. Ms. Narvaez reports stable pain her legs. Denies any fever/chills, N/V, change in appetite, chest pain, SOB, or abdominal pain. Endorses decent appetite with last bowel movement yesterday.        Objective     Last Recorded Vitals  /67 (BP Location: Left arm, Patient Position: Lying)   Pulse 103   Temp 36.8 °C (98.2 °F) (Temporal)   Resp 18   Wt 120 kg (264 lb 12.4 oz)   SpO2 97%   Intake/Output last 3 Shifts:    Intake/Output Summary (Last 24 hours) at 9/22/2024 0913  Last data filed at 9/22/2024 0836  Gross per 24 hour   Intake 340 ml   Output 1825 ml   Net -1485 ml     Admission Weight  Weight: 110 kg (242 lb 8.1 oz) (09/10/24 2000)    Daily Weight  09/17/24 : 120 kg (264 lb 12.4 oz)      Physical Exam  Constitutional:       General: She is not in acute distress.  HENT:      Head: Normocephalic and atraumatic.      Nose: Nose normal.      Mouth/Throat:      Mouth: Mucous membranes are moist.      Pharynx: Oropharynx is clear.   Eyes:      General: No scleral icterus.     Extraocular Movements: Extraocular movements intact.      Conjunctiva/sclera: Conjunctivae normal.      Pupils: Pupils are equal, round, and reactive to light.   Cardiovascular:      Rate and Rhythm: Normal rate and regular rhythm.      Pulses: Normal pulses.      Heart sounds: Normal heart sounds.   Pulmonary:      Effort: Pulmonary effort is normal. No respiratory distress.      Breath sounds: Normal breath sounds.   Abdominal:      General: Abdomen is flat. There is no distension.      Palpations: Abdomen is soft.      Tenderness: There is no abdominal tenderness.   Musculoskeletal:         General: Normal range of motion.      Cervical back: Normal range of motion and neck supple.      Right lower leg: No edema.      Left lower leg: No edema.   Skin:     General: Skin is warm  and dry.      Comments: Scattered, purple plaques across forehead and chest   Neurological:      General: No focal deficit present.      Mental Status: She is alert and oriented to person, place, and time.   Psychiatric:         Mood and Affect: Mood normal.         Behavior: Behavior normal.       Relevant Results  Results for orders placed or performed during the hospital encounter of 09/10/24 (from the past 24 hour(s))   Blood Culture    Specimen: Peripheral Venipuncture; Blood culture   Result Value Ref Range    Blood Culture Loaded on Instrument - Culture in progress      *Note: Due to a large number of results and/or encounters for the requested time period, some results have not been displayed. A complete set of results can be found in Results Review.         Imaging:  XR CHEST 1 VIEW; 9/21/2024 10:50 am   IMPRESSION:  1.  There is diffuse perihilar interstitial prominence and correlate  with fluid status or atypical infection. Minimal left basilar  atelectasis/effusion. If there is continued concern for pneumonia,  follow-up with PA and lateral x-ray would be helpful.  2. Prominence of the central pulmonary arteries and correlate with a  component of pulmonary artery hypertension..    XR ABDOMEN 1 VIEW; 9/21/2024 9:57 am  IMPRESSION:  1.  Nonobstructive bowel gas pattern.      Assessment & Plan  Sickle cell disease with crisis and other complication (Multi)    Senait Narvaez is a 53 year-old female with a PMHx of Hgb SC disease (previously on exchange transfusions q4-6 weeks, last transfusion 3/1/24), hx of SVC syndrome, recurrent DVT/PE (on lifelong Coumadin), pHTN (6/2023), cauda equina with saddle numbness, hx SVT, fibromyalgia, Raynaud's disease, CKD II (baseline SCr~<2) and CAN who presented to [facility] ED for diffuse pain and lethargy, then transferred to  MICU for hemodynamic instability. Patient was subsequently intubated due to worsening lethargic and lack of respiratory compensation from significant  metabolic acidosis with concern for ACS. Vancomycin and Zosyn started for empiric coverage, patient also started on pressors for BP support. Patient was  transfused 2 units pRBCs and then underwent RBCEx on 9/11. Cardiology consulted for c/f NSTEMI vs demand ischemia (ST depressions and elevated troponin), rec treat as ACS. Course further c/b severe CHARLES with peak sCr of 5.4, and acute liver injury with severely elevated liver enzymes (ALT 4119, AST >10k). Liver US only remarkable for fatty infiltration, viral hepatitis panel negative. CHARLES and liver injury improved with supportive management. Pruritic rash noted 9/14, Derm consulted and took multiple biopsies 9/15. Rash then began to worsen to involve the groin, lateral thigh, and scalp with numerous tense bullae. Discontinued heparin given concerns for potential HIT, started bivalirudin. Patient was extubated on 9/16/2024. Started stress dose steroids given continued pressor requirements, pressors Dc'd 9/17. Patient transferred to [facility] 9/17, PT/OT rec SNF. Coumadin bridge was started 9/18. Given persistent rash with unremarkable labs, derm re-biopsied on 9/18, findings ultimately felt to be most c/w fixed drug eruption. INR 3.8, bival and home warfarin stopped, repeat INR 9/21 3.2, per pharm recheck INR 9/22. Dermatology contacted for bx results (9/20) rec triamcinolone cream BID and interdry cloths. Leukocytosis uptrending 9/21, infectious workup started. DC pending improved pain and infectious workup.    Updates 9/22:   -Sickle cell crisis pain currently stable, encouraged patient to rotate IV Dilaudid with PO oxycodone   -Encouraged out of bed with nursing today  -AM labs pending, will follow-up for leukocytosis trend. No evidence of active infection otherwise  -Rash-related pain seems to be improved with topical triamcinolone; will continue to monitor  -Per discussion with pharmacy, plan to resume warfarin 5 mg daily if INR is between 2-3 today    #Purpuric rash,  suspected fixed drug eruption  #Concern for possible heparin-induced thrombocytopenia (HIT)-resolved  -Purple, reticular pattern of plaques noted around the bilateral breasts, scalp and groin,  with tense bullae. Non-erythematous, no purulence  - Some initial concern for possible heparin-induced thrombocytopenia with concurrent skin findings. Discontinued heparin 9/16 and transitioned to bivalirudin  -4 T score 0, PF4 w/ reflex BINA negative. Hematology ultimately consulted, felt there was low concern for HIT and signed off  -Differential diagnosis includes purpura secondary to infection versus coagulopathy versus vasculopathy versus small and/or medium vessel vasculitis versus calciphylaxis vs drug rash  -Dermatology consulted, work-up to date:  - Low Protein C activity  - ANCA, PR3 and MPO negative  - ISABELLE neg  - cryoglobulin labs- not enough specimen to analyze  - S/p skin punch biopsies x 2 9/15 and one on 9/18, showed SUPERFICIAL EPIDERMAL DEGENERATION WITH ERYTHROCYTE EXTRAVASATION WITH NEUTROPHILS. These findings could be seen secondary to an older trauma, or resolving erythema multiforme, a fixed drug eruption, Edgar-Christopher syndrome, or toxic epidermal necrolysis. Favor Multifocal Fixed Drug Eruption    - Dermatology contacted (9/20) about results, recommended adding vancomycin to allergy list and starting triamcinolone    PLAN:   - Continue triamcinolone 0.1% cream BID (9/20-current) per derm recs  - Plan for suture removal around 9/25-9/27     #Leukocytosis  - Likely I/s/o rash vs reactive vs infectious process  - leukocytosis noted on admission to MICU (WBC 24.8)  - leukocytosis improved throughout hospital stay however started uptrending 9/20  - Blood cultures 9/10, 9/12, 9/15 all NGTD  - Strep and Legionella Ag, MRSA nares negative  - CXR (9/21) largely unremarkable, again re-demonstrated perihilar prominence seen on prior XR  - Abdominal xray (9/21) unremarkable  - Repeat blood cultures x 2 (9/21)  pending  - UA (9/21) with 500 LE, urine cx pending    PLAN:  -AM labs 9/22 pending, will continue to monitor      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  # ACS  - Previously managed through routine RBC exchanges q6 weeks, most recent RBCEx 3/1/24, per patient pausing on transfusions for time being  - OARRS reviewed, no aberrant behavior noted  - LDH>12,000 (now downtrending), haptoglobin <30, fibrinogen 318, retic % 5.2 on presentation, bili 10.7  - leukocytosis 24.8 on admission to MICU  - CXR (9/10) persistent atelectasis/scarring in the left lower lung  - CT CAP (9/11) Diffuse mosaic attenuation of the lung parenchyma, which can be seen in the setting of small airway disease, pulmonary edema or acute infection  - intubated upon arrival to MICU, now s/p extubation 9/16  - started on vanc and zosyn (finished both courses prior to floor transfer)  - Procal elevated 7.09 (9/11)  - Blood cultures 9/10, 9/12, 9/15 all NGTD  - Strep and Legionella Ag, MRSA nares negative  - trop peak 1431, cards rec treat as ACS (see below)  - S/p 2u pRBCs 9/10 on arrival to MICU  - S/p exchange transfusion 9/11 AM for ACS  - Care plan from 12/6/23- For mild to moderate pain: Dilaudid 0.5mg IVP q3h as needed, for moderate to severe pain Dilaudid 1- 1.5mg q3h PRN  - On arrival to [facility], started on 0.6mg dilaudid IVP q3h PRN severe pain     PLAN:  -Pain currently stable  -Continue oxycodone 10 mg q4h PRN for moderate pain, 0.6 mg dilaudid IV q3h PRN for severe pain and   - Bowel regimen for opioid induced constipation, zofran for opioid induced nausea, and benadrly for opioid induced pruritus  - c/w home Duloxetine 40mg daily, PO Zofran PRN, Folic Acid 1mg daily, and MVI daily    # ST depression with tropinemia, suspected 2/2 demand ischemia iso ACS  # Hx SVT  - Elevated troponins on admission, peak 1431 and now downtrending   - EKG with ST depressions, likely Type II MI due to vaso-occlusive crisis and anemia  - TTE 9/11: EF 60-65%, RV enlarged and  reduced in function which appears stable when compared to last TTE (1/2024)  - Troponin leak is likely due to cardiac demand vs supply mismatch in the setting of worsening anemia and vaso-occlusive crisis  - Cardiology consulted, now signed off with indication to treat troponemia as ACS    PLAN:    - Lasix held in MICU 2/2 hypotension and CHARLES, can resume as able   - Follow-up with Cardiology outpt, FUV 2/13/25     # CHARLES on CKD II, improving  - Baseline ~ SCr <2, peak 5.4 this admission. 9/21 sCr 1.58  - likely mixed pre-renal/intra-renal, on CKD II, now polyuric phase  - FeNa 1.2%, consistent with ATN  - Severe hyperkalemia with previous peaked T waves on EKG, resolved  - Nephrology followed, now signed off, no need for HD  - s/p multiple K cocktails in MICU  - encourage increased PO intake     # Acute liver injury, improving  # Direct hyperbilirubinuria, improving  - Suspected 2/2 hemodynamic instability versus sickle cell crisis  - On presentation to  MICU ALT 2611, AST 4727, T bili 10.2, INR 3.0; continues to improve  - CT with hepatic venous congestion, patient lacks gallbladder  - Liver US with Doppler: Diffuse fatty infiltration, unremarkable doppler interrogation of the liver  - EBV IgG positive, IgM negative  - CMV IgG positive, PCR negative  - Acute hepatitis panel unremarkable  - Hepatology initially following, now signed off  -Will continue to monitor, trend daily CMP     # pHTN   - Initial c/f CTEPH as imaging findings with dilated PA, filling defects, and mosaicism  - VQ scan (6/13/23) with non anatomical basal defects and RUL defect due to scar--> not favoring CTEPH per Dr. Yi and Dr. 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 On lifelong anticoagulation, DOAC discouraged due to high risk of clotting   - home Coumadin 5mg daily initially HELD on admission to MICU 2/2 unstable course  - restarted coumadin 9/18 PM, will plan to bridge with bival, Ok'd by heme   - 9/20 INR 3.8, bival and home warfarin held. Pharmacy rec repeat INR 4 hours after bival was held. If the INR < 2, we can restart the bival, but if the INR 2-3, we can continue with warfarin monotherapy   - 9/20 repeat INR 3.7, pharmacy rec continuing to hold both medications  - 9/21 INR 3.2, per pharmacy - get repeat INR in AM 9/22, if INR between 2-3 can restart home warfarin 5mg daily    PLAN:  -Pending AM INR, ok to resume warfarin 5 mg daily if between 2-3  - Follows with Coumadin clinic outpatient  - Caution with fluids      # CAN  - cont home CPAP   - cont home Albuterol PRN     # H/O Cauda Equine syndrome  - Follows Dr. Delacruz as outpatient  - no current issues      # DISPO  - Full code- confirmed on admission  - NOK: Tati Salgado (mother) (#[phone])  - PT/OT rec SNF, pending choice/placement  - FUV PCP 10/1, Anim 10/7, Cardiology 2/13            Kelin Diaz MD

## 2024-09-23 ENCOUNTER — TELEPHONE (OUTPATIENT)
Dept: DERMATOLOGY | Facility: CLINIC | Age: 54
End: 2024-09-23
Payer: COMMERCIAL

## 2024-09-23 LAB
ALBUMIN SERPL BCP-MCNC: 2.6 G/DL (ref 3.4–5)
ALP SERPL-CCNC: 161 U/L (ref 33–110)
ALT SERPL W P-5'-P-CCNC: 66 U/L (ref 7–45)
ANION GAP SERPL CALC-SCNC: 11 MMOL/L (ref 10–20)
AST SERPL W P-5'-P-CCNC: 37 U/L (ref 9–39)
BASO STIPL BLD QL SMEAR: PRESENT
BASOPHILS # BLD MANUAL: 0 X10*3/UL (ref 0–0.1)
BASOPHILS NFR BLD MANUAL: 0 %
BILIRUB SERPL-MCNC: 2.9 MG/DL (ref 0–1.2)
BUN SERPL-MCNC: 23 MG/DL (ref 6–23)
CALCIUM SERPL-MCNC: 8.8 MG/DL (ref 8.6–10.6)
CHLORIDE SERPL-SCNC: 106 MMOL/L (ref 98–107)
CO2 SERPL-SCNC: 30 MMOL/L (ref 21–32)
CREAT SERPL-MCNC: 1.1 MG/DL (ref 0.5–1.05)
EGFRCR SERPLBLD CKD-EPI 2021: 59 ML/MIN/1.73M*2
EOSINOPHIL # BLD MANUAL: 0.16 X10*3/UL (ref 0–0.7)
EOSINOPHIL NFR BLD MANUAL: 0.9 %
ERYTHROCYTE [DISTWIDTH] IN BLOOD BY AUTOMATED COUNT: 20.2 % (ref 11.5–14.5)
GLUCOSE SERPL-MCNC: 70 MG/DL (ref 74–99)
HCT VFR BLD AUTO: 26.9 % (ref 36–46)
HGB BLD-MCNC: 8.6 G/DL (ref 12–16)
HGB RETIC QN: 33 PG (ref 28–38)
IMM GRANULOCYTES # BLD AUTO: 0.13 X10*3/UL (ref 0–0.7)
IMM GRANULOCYTES NFR BLD AUTO: 0.7 % (ref 0–0.9)
IMMATURE RETIC FRACTION: 21.7 %
INR PPP: 2.4 (ref 0.9–1.1)
LDH SERPL L TO P-CCNC: 280 U/L (ref 84–246)
LYMPHOCYTES # BLD MANUAL: 0.92 X10*3/UL (ref 1.2–4.8)
LYMPHOCYTES NFR BLD MANUAL: 5.1 %
MCH RBC QN AUTO: 30.7 PG (ref 26–34)
MCHC RBC AUTO-ENTMCNC: 32 G/DL (ref 32–36)
MCV RBC AUTO: 96 FL (ref 80–100)
MONOCYTES # BLD MANUAL: 1.37 X10*3/UL (ref 0.1–1)
MONOCYTES NFR BLD MANUAL: 7.6 %
NEUTS SEG # BLD MANUAL: 15.55 X10*3/UL (ref 1.2–7)
NEUTS SEG NFR BLD MANUAL: 86.4 %
NRBC BLD-RTO: 14.1 /100 WBCS (ref 0–0)
PAPPENHEIMER BOD BLD QL SMEAR: PRESENT
PLATELET # BLD AUTO: 479 X10*3/UL (ref 150–450)
POLYCHROMASIA BLD QL SMEAR: ABNORMAL
POTASSIUM SERPL-SCNC: 3.7 MMOL/L (ref 3.5–5.3)
PROT SERPL-MCNC: 5.7 G/DL (ref 6.4–8.2)
PROTHROMBIN TIME: 26.9 SECONDS (ref 9.8–12.8)
RBC # BLD AUTO: 2.8 X10*6/UL (ref 4–5.2)
RBC MORPH BLD: ABNORMAL
RETICS #: 0.34 X10*6/UL (ref 0.02–0.08)
RETICS/RBC NFR AUTO: 12.3 % (ref 0.5–2)
SODIUM SERPL-SCNC: 143 MMOL/L (ref 136–145)
STAPHYLOCOCCUS SPEC CULT: NORMAL
TARGETS BLD QL SMEAR: ABNORMAL
TOTAL CELLS COUNTED BLD: 118
WBC # BLD AUTO: 18 X10*3/UL (ref 4.4–11.3)

## 2024-09-23 PROCEDURE — 36415 COLL VENOUS BLD VENIPUNCTURE: CPT

## 2024-09-23 PROCEDURE — 2500000001 HC RX 250 WO HCPCS SELF ADMINISTERED DRUGS (ALT 637 FOR MEDICARE OP)

## 2024-09-23 PROCEDURE — 2500000004 HC RX 250 GENERAL PHARMACY W/ HCPCS (ALT 636 FOR OP/ED)

## 2024-09-23 PROCEDURE — 1170000001 HC PRIVATE ONCOLOGY ROOM DAILY

## 2024-09-23 PROCEDURE — 2500000002 HC RX 250 W HCPCS SELF ADMINISTERED DRUGS (ALT 637 FOR MEDICARE OP, ALT 636 FOR OP/ED)

## 2024-09-23 PROCEDURE — 85007 BL SMEAR W/DIFF WBC COUNT: CPT

## 2024-09-23 PROCEDURE — 99233 SBSQ HOSP IP/OBS HIGH 50: CPT

## 2024-09-23 PROCEDURE — 85610 PROTHROMBIN TIME: CPT

## 2024-09-23 PROCEDURE — 80053 COMPREHEN METABOLIC PANEL: CPT

## 2024-09-23 PROCEDURE — 85027 COMPLETE CBC AUTOMATED: CPT

## 2024-09-23 PROCEDURE — 83615 LACTATE (LD) (LDH) ENZYME: CPT

## 2024-09-23 PROCEDURE — 85045 AUTOMATED RETICULOCYTE COUNT: CPT

## 2024-09-23 RX ORDER — WARFARIN SODIUM 5 MG/1
5 TABLET ORAL DAILY
Status: DISPENSED | OUTPATIENT
Start: 2024-09-23

## 2024-09-23 RX ORDER — POTASSIUM CHLORIDE 1.5 G/1.58G
40 POWDER, FOR SOLUTION ORAL ONCE
Status: COMPLETED | OUTPATIENT
Start: 2024-09-23 | End: 2024-09-23

## 2024-09-23 ASSESSMENT — PAIN SCALES - GENERAL
PAINLEVEL_OUTOF10: 6
PAINLEVEL_OUTOF10: 9
PAINLEVEL_OUTOF10: 9
PAINLEVEL_OUTOF10: 5 - MODERATE PAIN
PAINLEVEL_OUTOF10: 9
PAINLEVEL_OUTOF10: 6
PAINLEVEL_OUTOF10: 8
PAINLEVEL_OUTOF10: 6
PAINLEVEL_OUTOF10: 9
PAINLEVEL_OUTOF10: 10 - WORST POSSIBLE PAIN
PAINLEVEL_OUTOF10: 0 - NO PAIN
PAINLEVEL_OUTOF10: 6

## 2024-09-23 ASSESSMENT — COGNITIVE AND FUNCTIONAL STATUS - GENERAL
TURNING FROM BACK TO SIDE WHILE IN FLAT BAD: A LOT
HELP NEEDED FOR BATHING: A LOT
MOBILITY SCORE: 8
WALKING IN HOSPITAL ROOM: TOTAL
DRESSING REGULAR UPPER BODY CLOTHING: TOTAL
STANDING UP FROM CHAIR USING ARMS: TOTAL
EATING MEALS: A LITTLE
TOILETING: TOTAL
MOVING FROM LYING ON BACK TO SITTING ON SIDE OF FLAT BED WITH BEDRAILS: A LOT
CLIMB 3 TO 5 STEPS WITH RAILING: TOTAL
DAILY ACTIVITIY SCORE: 11
PERSONAL GROOMING: A LOT
MOVING TO AND FROM BED TO CHAIR: TOTAL
DRESSING REGULAR LOWER BODY CLOTHING: A LOT

## 2024-09-23 ASSESSMENT — PAIN - FUNCTIONAL ASSESSMENT
PAIN_FUNCTIONAL_ASSESSMENT: 0-10

## 2024-09-23 NOTE — CARE PLAN
Problem: Pain - Adult  Goal: Verbalizes/displays adequate comfort level or baseline comfort level  Outcome: Progressing     Problem: Safety - Adult  Goal: Free from fall injury  Outcome: Progressing     Problem: Skin  Goal: Decreased wound size/increased tissue granulation at next dressing change  Outcome: Progressing  Flowsheets (Taken 9/23/2024 1935)  Decreased wound size/increased tissue granulation at next dressing change: Promote sleep for wound healing  Goal: Participates in plan/prevention/treatment measures  Outcome: Progressing  Goal: Prevent/manage excess moisture  Outcome: Progressing  Flowsheets (Taken 9/23/2024 1935)  Prevent/manage excess moisture: Cleanse incontinence/protect with barrier cream  Goal: Prevent/minimize sheer/friction injuries  Outcome: Progressing  Flowsheets (Taken 9/23/2024 1935)  Prevent/minimize sheer/friction injuries:   Use pull sheet   HOB 30 degrees or less  Goal: Promote/optimize nutrition  Outcome: Progressing  Goal: Promote skin healing  Outcome: Progressing     Problem: Knowledge Deficit  Goal: Patient/family/caregiver demonstrates understanding of disease process, treatment plan, medications, and discharge instructions  Outcome: Progressing     Problem: Pain  Goal: Takes deep breaths with improved pain control throughout the shift  Outcome: Progressing  Goal: Turns in bed with improved pain control throughout the shift  Outcome: Progressing  Goal: Walks with improved pain control throughout the shift  Outcome: Progressing  Goal: Performs ADL's with improved pain control throughout shift  Outcome: Progressing  Goal: Participates in PT with improved pain control throughout the shift  Outcome: Progressing  Goal: Free from opioid side effects throughout the shift  Outcome: Progressing  Goal: Free from acute confusion related to pain meds throughout the shift  Outcome: Progressing     The clinical goals for the shift include Pt will remain HDS through EOS

## 2024-09-23 NOTE — PROGRESS NOTES
Senait Narvaez is a 54 y.o. female on day 13 of admission presenting with Sickle cell disease with crisis and other complication.      Subjective   NAEO. Patient reported pain is stable. Denies any fever/chills, N/V, change in appetite, chest pain, SOB, or abdominal pain. Patient reported that the rash is slightly improved.       Objective     Last Recorded Vitals  /73 (BP Location: Right arm, Patient Position: Lying) Comment (BP Location): fore arm  Pulse 92   Temp 36.7 °C (98.1 °F) (Temporal)   Resp 18   Wt 120 kg (264 lb 12.4 oz)   SpO2 96%   Intake/Output last 3 Shifts:    Intake/Output Summary (Last 24 hours) at 9/23/2024 1130  Last data filed at 9/23/2024 0527  Gross per 24 hour   Intake --   Output 1200 ml   Net -1200 ml     Admission Weight  Weight: 110 kg (242 lb 8.1 oz) (09/10/24 2000)    Daily Weight  09/17/24 : 120 kg (264 lb 12.4 oz)      Physical Exam  Constitutional:       General: She is not in acute distress.  HENT:      Head: Normocephalic and atraumatic.      Nose: Nose normal.      Mouth/Throat:      Mouth: Mucous membranes are moist.      Pharynx: Oropharynx is clear.   Eyes:      General: No scleral icterus.     Extraocular Movements: Extraocular movements intact.      Conjunctiva/sclera: Conjunctivae normal.      Pupils: Pupils are equal, round, and reactive to light.   Cardiovascular:      Rate and Rhythm: Normal rate and regular rhythm.      Pulses: Normal pulses.      Heart sounds: Normal heart sounds.   Pulmonary:      Effort: Pulmonary effort is normal. No respiratory distress.      Breath sounds: Normal breath sounds.   Abdominal:      General: Abdomen is flat. There is no distension.      Palpations: Abdomen is soft.      Tenderness: There is no abdominal tenderness.   Musculoskeletal:         General: Normal range of motion.      Cervical back: Normal range of motion and neck supple.      Right lower leg: No edema.      Left lower leg: No edema.   Skin:     General: Skin is  warm and dry.      Comments: Scattered, purple plaques across forehead and chest   Neurological:      General: No focal deficit present.      Mental Status: She is alert and oriented to person, place, and time.   Psychiatric:         Mood and Affect: Mood normal.         Behavior: Behavior normal.       Relevant Results  Results for orders placed or performed during the hospital encounter of 09/10/24 (from the past 24 hour(s))   Lactate dehydrogenase   Result Value Ref Range     (H) 84 - 246 U/L   Reticulocytes   Result Value Ref Range    Retic % 13.1 (H) 0.5 - 2.0 %    Retic Absolute 0.390 (H) 0.018 - 0.083 x10*6/uL    Reticulocyte Hemoglobin 31 28 - 38 pg    Immature Retic fraction 20.5 (H) <=16.0 %   Comprehensive metabolic panel   Result Value Ref Range    Glucose 85 74 - 99 mg/dL    Sodium 141 136 - 145 mmol/L    Potassium 3.7 3.5 - 5.3 mmol/L    Chloride 105 98 - 107 mmol/L    Bicarbonate 28 21 - 32 mmol/L    Anion Gap 12 10 - 20 mmol/L    Urea Nitrogen 27 (H) 6 - 23 mg/dL    Creatinine 1.09 (H) 0.50 - 1.05 mg/dL    eGFR 60 (L) >60 mL/min/1.73m*2    Calcium 8.9 8.6 - 10.6 mg/dL    Albumin 2.8 (L) 3.4 - 5.0 g/dL    Alkaline Phosphatase 192 (H) 33 - 110 U/L    Total Protein 6.2 (L) 6.4 - 8.2 g/dL    AST 42 (H) 9 - 39 U/L    Bilirubin, Total 3.2 (H) 0.0 - 1.2 mg/dL    ALT 79 (H) 7 - 45 U/L   Protime-INR   Result Value Ref Range    Protime 34.1 (H) 9.8 - 12.8 seconds    INR 3.0 (H) 0.9 - 1.1   CBC and Auto Differential   Result Value Ref Range    WBC 17.7 (H) 4.4 - 11.3 x10*3/uL    nRBC 14.0 (H) 0.0 - 0.0 /100 WBCs    RBC 2.98 (L) 4.00 - 5.20 x10*6/uL    Hemoglobin 8.9 (L) 12.0 - 16.0 g/dL    Hematocrit 28.5 (L) 36.0 - 46.0 %    MCV 96 80 - 100 fL    MCH 29.9 26.0 - 34.0 pg    MCHC 31.2 (L) 32.0 - 36.0 g/dL    RDW 21.0 (H) 11.5 - 14.5 %    Platelets 449 150 - 450 x10*3/uL    Immature Granulocytes %, Automated      Immature Granulocytes Absolute, Automated     Magnesium   Result Value Ref Range    Magnesium  1.69 1.60 - 2.40 mg/dL   Manual Differential   Result Value Ref Range    Neutrophils %, Manual 91.5 40.0 - 80.0 %    Lymphocytes %, Manual 4.2 13.0 - 44.0 %    Monocytes %, Manual 3.4 2.0 - 10.0 %    Eosinophils %, Manual 0.0 0.0 - 6.0 %    Basophils %, Manual 0.0 0.0 - 2.0 %    Atypical Lymphocytes %, Manual 0.9 0.0 - 2.0 %    Seg Neutrophils Absolute, Manual 16.20 (H) 1.20 - 7.00 x10*3/uL    Lymphocytes Absolute, Manual 0.74 (L) 1.20 - 4.80 x10*3/uL    Monocytes Absolute, Manual 0.60 0.10 - 1.00 x10*3/uL    Eosinophils Absolute, Manual 0.00 0.00 - 0.70 x10*3/uL    Basophils Absolute, Manual 0.00 0.00 - 0.10 x10*3/uL    Atypical Lymphs Absolute, Manual 0.16 0.00 - 0.50 x10*3/uL    Total Cells Counted 118     Manual nRBC per 100 Cells 24.6 (H) 0.0 - 0.0 %    RBC Morphology See Below     Polychromasia Mild     Hypochromia Mild     Target Cells Many     Basophilic Stippling Present     Pappenheimer Bodies Present    Lactate dehydrogenase   Result Value Ref Range     (H) 84 - 246 U/L   Comprehensive metabolic panel   Result Value Ref Range    Glucose 70 (L) 74 - 99 mg/dL    Sodium 143 136 - 145 mmol/L    Potassium 3.7 3.5 - 5.3 mmol/L    Chloride 106 98 - 107 mmol/L    Bicarbonate 30 21 - 32 mmol/L    Anion Gap 11 10 - 20 mmol/L    Urea Nitrogen 23 6 - 23 mg/dL    Creatinine 1.10 (H) 0.50 - 1.05 mg/dL    eGFR 59 (L) >60 mL/min/1.73m*2    Calcium 8.8 8.6 - 10.6 mg/dL    Albumin 2.6 (L) 3.4 - 5.0 g/dL    Alkaline Phosphatase 161 (H) 33 - 110 U/L    Total Protein 5.7 (L) 6.4 - 8.2 g/dL    AST 37 9 - 39 U/L    Bilirubin, Total 2.9 (H) 0.0 - 1.2 mg/dL    ALT 66 (H) 7 - 45 U/L   Protime-INR   Result Value Ref Range    Protime 26.9 (H) 9.8 - 12.8 seconds    INR 2.4 (H) 0.9 - 1.1   Reticulocytes   Result Value Ref Range    Retic % 12.3 (H) 0.5 - 2.0 %    Retic Absolute 0.345 (H) 0.018 - 0.083 x10*6/uL    Reticulocyte Hemoglobin 33 28 - 38 pg    Immature Retic fraction 21.7 (H) <=16.0 %     *Note: Due to a large number  of results and/or encounters for the requested time period, some results have not been displayed. A complete set of results can be found in Results Review.         Imaging:  XR CHEST 1 VIEW; 9/21/2024 10:50 am   IMPRESSION:  1.  There is diffuse perihilar interstitial prominence and correlate  with fluid status or atypical infection. Minimal left basilar  atelectasis/effusion. If there is continued concern for pneumonia,  follow-up with PA and lateral x-ray would be helpful.  2. Prominence of the central pulmonary arteries and correlate with a  component of pulmonary artery hypertension..    XR ABDOMEN 1 VIEW; 9/21/2024 9:57 am  IMPRESSION:  1.  Nonobstructive bowel gas pattern.      Assessment & Plan  Sickle cell disease with crisis and other complication    Senait Narvaez is a 53 year-old female with a PMHx of Hgb SC disease (previously on exchange transfusions q4-6 weeks, last transfusion 3/1/24), hx of SVC syndrome, 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 Patient was subsequently intubated due to worsening lethargic and lack of respiratory compensation from significant metabolic acidosis with concern for ACS. Vancomycin and Zosyn started for empiric coverage, patient also started on pressors for BP support. Patient was  transfused 2 units pRBCs and then underwent RBCEx on 9/11. Cardiology consulted for c/f NSTEMI vs demand ischemia (ST depressions and elevated troponin), rec treat as ACS. Course further c/b severe CHARLES with peak sCr of 5.4, and acute liver injury with severely elevated liver enzymes (ALT 4119, AST >10k). Liver US only remarkable for fatty infiltration, viral hepatitis panel negative. CHARLES and liver injury improved with supportive management. Pruritic rash noted 9/14,  Derm consulted and took multiple biopsies 9/15. Rash then began to worsen to involve the groin, lateral thigh, and scalp with numerous tense bullae. Discontinued heparin given concerns for potential HIT, started bivalirudin. Patient was extubated on 9/16/2024. Started stress dose steroids given continued pressor requirements, pressors Dc'd 9/17. Patient transferred to [facility] 9/17, PT/OT rec SNF. Coumadin bridge was started 9/18. Given persistent rash with unremarkable labs, derm re-biopsied on 9/18, findings ultimately felt to be most c/w fixed drug eruption. INR 3.8, bival and home warfarin stopped, repeat INR 9/21 3.2, per pharm recheck INR 9/22. Dermatology contacted for bx results (9/20) rec triamcinolone cream BID and interdry cloths. Leukocytosis uptrending 9/21, infectious workup started. DC pending improved pain and infectious workup.    Updates 9/23:  -INR 2.4, Restarted home Warfarin 5mg, INR pending tomorrow , per pharmacy recommendations   -Sickle cell crisis pain currently stable, encouraged patient to rotate IV Dilaudid with PO oxycodone   -Rash-related pain seems to be improved with topical triamcinolone; will continue to monitor    #Purpuric rash, suspected fixed drug eruption  #Concern for possible heparin-induced thrombocytopenia (HIT)-resolved  -Purple, reticular pattern of plaques noted around the bilateral breasts, scalp and groin,  with tense bullae. Non-erythematous, no purulence  - Some initial concern for possible heparin-induced thrombocytopenia with concurrent skin findings. Discontinued heparin 9/16 and transitioned to bivalirudin  -4 T score 0, PF4 w/ reflex BINA negative. Hematology ultimately consulted, felt there was low concern for HIT and signed off  -Differential diagnosis includes purpura secondary to infection versus coagulopathy versus vasculopathy versus small and/or medium vessel vasculitis versus calciphylaxis vs drug rash  -Dermatology consulted, work-up to date:  - Low Protein  C activity  - ANCA, PR3 and MPO negative  - ISABELLE neg  - cryoglobulin labs- not enough specimen to analyze  - S/p skin punch biopsies x 2 9/15 and one on 9/18, showed SUPERFICIAL EPIDERMAL DEGENERATION WITH ERYTHROCYTE EXTRAVASATION WITH NEUTROPHILS. These findings could be seen secondary to an older trauma, or resolving erythema multiforme, a fixed drug eruption, Edgar-Christopher syndrome, or toxic epidermal necrolysis. Favor Multifocal Fixed Drug Eruption    - Dermatology contacted (9/20) about results, recommended adding vancomycin to allergy list and starting triamcinolone    PLAN:   - Continue triamcinolone 0.1% cream BID (9/20-current) per derm recs  - Plan for suture removal around 9/25-9/27     #Leukocytosis  - Likely I/s/o rash vs reactive vs infectious process  - leukocytosis noted on admission to MICU (WBC 24.8)  - leukocytosis improved throughout hospital stay however started uptrending 9/20  - Blood cultures 9/10, 9/12, 9/15 all NGTD  - Strep and Legionella Ag, MRSA nares negative  - CXR (9/21) largely unremarkable, again re-demonstrated perihilar prominence seen on prior XR  - Abdominal xray (9/21) unremarkable  - Repeat blood cultures x 2 (9/21) pending  - UA (9/21) with 500 LE, urine cx pending    PLAN:  -AM labs 9/23 pending, will continue to monitor      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  # ACS  - Previously managed through routine RBC exchanges q6 weeks, most recent RBCEx 3/1/24, per patient pausing on transfusions for time being  - OARRS reviewed, no aberrant behavior noted  - LDH>12,000 (now downtrending), haptoglobin <30, fibrinogen 318, retic % 5.2 on presentation, bili 10.7  - leukocytosis 24.8 on admission to MICU  - CXR (9/10) persistent atelectasis/scarring in the left lower lung  - CT CAP (9/11) Diffuse mosaic attenuation of the lung parenchyma, which can be seen in the setting of small airway disease, pulmonary edema or acute infection  - intubated upon arrival to MICU, now s/p extubation  9/16  - started on vanc and zosyn (finished both courses prior to floor transfer)  - Procal elevated 7.09 (9/11)  - Blood cultures 9/10, 9/12, 9/15 all NGTD  - Strep and Legionella Ag, MRSA nares negative  - trop peak 1431, cards rec treat as ACS (see below)  - [facility]  - S/p exchange transfusion 9/11 AM for ACS  - Care plan from 12/6/23- For mild to moderate pain: Dilaudid 0.5mg IVP q3h as needed, for moderate to severe pain Dilaudid 1- 1.5mg q3h PRN  - On arrival to [facility], started on 0.6mg dilaudid IVP q3h PRN severe pain     PLAN:  -Pain currently stable  -Continue oxycodone 10 mg q4h PRN for moderate pain, 0.6 mg dilaudid IV q3h PRN for severe pain and   - Bowel regimen for opioid induced constipation, zofran for opioid induced nausea, and benadrly for opioid induced pruritus  - c/w home Duloxetine 40mg daily, PO Zofran PRN, Folic Acid 1mg daily, and MVI daily    # ST depression with tropinemia, suspected 2/2 demand ischemia iso ACS  # Hx SVT  - Elevated troponins on admission, peak 1431 and now downtrending   - EKG with ST depressions, likely Type II MI due to vaso-occlusive crisis and anemia  - TTE 9/11: EF 60-65%, RV enlarged and reduced in function which appears stable when compared to last TTE (1/2024)  - Troponin leak is likely due to cardiac demand vs supply mismatch in the setting of worsening anemia and vaso-occlusive crisis  - Cardiology consulted, now signed off with indication to treat troponemia as ACS    PLAN:    - Lasix held in MICU 2/2 hypotension and CHARLES, can resume as able   - Follow-up with Cardiology outpt, FUV 2/13/25     # CHARLES on CKD II, improving  - Baseline ~ SCr <2, peak 5.4 this admission. 9/21 sCr 1.58  - likely mixed pre-renal/intra-renal, on CKD II, now polyuric phase  - FeNa 1.2%, consistent with ATN  - Severe hyperkalemia with previous peaked T waves on EKG, resolved  - Nephrology followed, now signed off, no need for HD  - s/p multiple K cocktails in  MICU  - encourage increased PO intake     # Acute liver injury, improving  # Direct hyperbilirubinuria, improving  - Suspected 2/2 hemodynamic instability versus sickle cell crisis  - On presentation to  MICU ALT 2611, AST 4727, T bili 10.2, INR 3.0; continues to improve  - CT with hepatic venous congestion, patient lacks gallbladder  - Liver US with Doppler: Diffuse fatty infiltration, unremarkable doppler interrogation of the liver  - EBV IgG positive, IgM negative  - CMV IgG positive, PCR negative  - Acute hepatitis panel unremarkable  - Hepatology initially following, now signed off  -Will continue to monitor, trend daily CMP     # pHTN   - Initial c/f CTEPH as imaging findings with dilated PA, filling defects, and mosaicism  - VQ scan (6/13/23) with non anatomical basal defects and RUL defect due to scar--> not favoring CTEPH per Dr. Yi and Dr. 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 On lifelong anticoagulation, DOAC discouraged due to high risk of clotting   - home Coumadin 5mg daily initially HELD on admission to MICU 2/2 unstable course  - restarted coumadin 9/18 PM, will plan to bridge with bival, Ok'd by heme   - 9/20 INR 3.8, bival and home warfarin held. Pharmacy rec repeat INR 4 hours after bival was held. If the INR < 2, we can restart the bival, but if the INR 2-3, we can continue with warfarin  monotherapy   - 9/20 repeat INR 3.7, pharmacy rec continuing to hold both medications  - 9/21 INR 3.2, per pharmacy - get repeat INR in AM 9/22, if INR between 2-3 can restart home warfarin 5mg daily    PLAN:  -Started Warfarin 5mg daily per Pharm recs, INR pending tomorrow AM  - Follows with Coumadin clinic outpatient  - Caution with fluids      # CAN  - cont home CPAP   - cont home Albuterol PRN     # H/O Cauda Equine syndrome  - Follows Dr. Delacruz as outpatient  - no current issues      # DISPO  - Full code- confirmed on admission  - NOK: Tati Salgado (mother) (#[phone])  - PT/OT rec SNF, pending choice/placement  - FUV PCP 10/1, Anim 10/7, Cardiology 2/13            Asael Irby MD

## 2024-09-23 NOTE — PROGRESS NOTES
09/23/24 1300   Discharge Planning   Who is requesting discharge planning? Patient   Home or Post Acute Services Post acute facilities (Rehab/SNF/etc)   Type of Post Acute Facility Services Skilled nursing   Expected Discharge Disposition SNF   Does the patient need discharge transport arranged? Yes   RoundTrip coordination needed? Yes   Has discharge transport been arranged? No     Care Transitions Note  9/23/2024  Senait's mother spoke with this LSW and stated that upon further consideration, they would like to pursue SNF upon discharge. Placed referral in [facility] to [facility], which is [facility]. Will follow.   YULI Zamorano

## 2024-09-23 NOTE — PROGRESS NOTES
"Nutrition Follow Up Assessment:   Nutrition Assessment    The patient is a 54 y.o. female who is hospital day #13.  Pt initially admitted to MICU and intubated d/t lethargy and lack of respiratory compensation d/t metabolic acidosis.  Abx started and RBC exchange done. Extubated 09/16. Started on steroids. Derm following for rash. Working on pain management.     PMHx: Hgb SC disease, hx of SVC syndrome, recurrent DVT/PE (on lifelong Coumadin), pHTN (6/2023), cauda equina with saddle numbness, hx SVT, fibromyalgia, Raynaud's disease, CKD II (baseline SCr~<2) and CAN      Nutrition History:  Food and Nutrient History: Pt reports eating ok. States she has been doing 2-3 meals a day which is her baseline. Mostly doing soups and soft foods like mashed potatoes. Reports that mouth/throat feels \"raw\" still from being intubated. Liquids she is able to tolerate well. Has not tried ONS in the past. PTA reports she was not eating much either as her appetite was decreased. No N/V/D/C recently. No food allergies.      Anthropometrics:  Start of admission anthropometrics:  Height: 165.1 cm (5' 5\")  Weight: 110 kg (242 lb 8.1 oz)  BMI (Calculated): 40.36    IBW/kg (Dietitian Calculated): 56.8 kg  Percent of IBW: 194 %         Date/Time Weight   09/17/24 0600 120 kg (264 lb 12.4 oz)   09/16/24 0600 123 kg (270 lb 1 oz)   09/14/24 0835 122 kg (268 lb 15.4 oz)   09/13/24 0600 123 kg (271 lb 9.7 oz)   09/12/24 0500 123 kg (270 lb 8.1 oz)   09/10/24 2000 110 kg (242 lb 8.1 oz)       Weight Change %:  Weight History / % Weight Change: Wt increased from start of admission likely d/t fluids. No new wt since transfer to [facility]. Pt reports UBW of 236#.    Nutrition Focused Physical Exam Findings:  defer: Refer to 09/11 - well nourished     Edema:  Edema: +1 trace  Edema Location: generalized  Physical Findings:  Skin:  (rash throughout body)    Objective Data:    Last BM Date: 09/21/24    Nutrition Significant Labs:    Results from last 7 days "   Lab Units 09/23/24  0751 09/22/24  1846 09/21/24  0900 09/20/24  1609   HEMOGLOBIN g/dL  --  8.9* 8.4* 10.4*   MCV fL  --  96 101* 95   GLUCOSE mg/dL 70* 85 74  --    POTASSIUM mmol/L 3.7 3.7 3.8  --    SODIUM mmol/L 143 141 139  --    MAGNESIUM mg/dL  --  1.69 1.89 2.23   CREATININE mg/dL 1.10* 1.09* 1.58*  --    BUN mg/dL 23 27* 41*  --    ALT U/L 66* 79* 98*  --    AST U/L 37 42* 38  --    ALK PHOS U/L 161* 192* 170*  --        Nutrition Specific Medications:  bisacodyl, 10 mg, rectal, Daily  folic acid, 1 mg, oral, Daily  pantoprazole, 40 mg, intravenous, Daily  polyethylene glycol, 17 g, oral, BID  potassium chloride, 40 mEq, oral, Once  sennosides, 2 tablet, oral, BID  thiamine, 100 mg, intravenous, Daily    I/O:   I/O last 2 completed shifts:  In: - (0 mL/kg)   Out: 1600 (13.3 mL/kg) [Urine:1600 (0.6 mL/kg/hr)]  Weight: 120.1 kg     Dietary Orders (From admission, onward)       Start     Ordered    09/17/24 1014  Adult diet Regular  Diet effective now        Question:  Diet type  Answer:  Regular    09/17/24 1013                     Estimated Needs:   Total Energy Estimated Needs (kCal): 1704 kCal  Method for Estimating Needs: 30 kcal/kg IBW  MSJ = 1699    Total Protein Estimated Needs (g): 85 g  Method for Estimating Needs: 56.8kg x 1.5 g/kg    Method for Estimating Needs: 1 ml/kcal or per team          Nutrition Diagnosis        Nutrition Diagnosis  Patient has Nutrition Diagnosis: Yes  Diagnosis Status (1): Resolved  Nutrition Diagnosis 1: Increased nutrient needs  Related to (1): need for intubation; obesity  As Evidenced by (1): altered metabolism in the setting of critical illness with h/o obesity (BMI= 40.4)  Additional Nutrition Diagnosis: Diagnosis 2  Diagnosis Status (2): New  Nutrition Diagnosis 2: Predicted inadequate nutrient intake  Related to (2): recent prolonged intubation  As Evidenced by (2): pt reports only able to eat soft foods and liquids       Nutrition Interventions/Recommendations    Individualized Nutrition Prescription Provided for : 1700 kcal and 85g protein via oral diet    Medical Food Supplement: Commercial beverage  Goal: Ensure Plus (350 kcal, 13g protein) BID  Orders placed   Obtain repeat wt       Nutrition Education:   Discussed ONS use when PO intake is decreased and consumption of soft foods.     Nutrition Monitoring and Evaluation   Monitoring and Evaluation Plan: Energy intake  Energy Intake: Estimated energy intake  Criteria: >50% of nutr needs    Monitoring and Evaluation Plan: Weight  Weight: Weight change  Criteria: no wt change                   Time Spent (min): 45 minutes

## 2024-09-23 NOTE — CARE PLAN
Patient afebrile. Vitals stable. Still having pain. No nausea or vomiting. No issues. Will continue to monitor.

## 2024-09-23 NOTE — SIGNIFICANT EVENT
Rapid Response RN Note    Rapid response RN for RADAR score 6 due to the recent VS listed below:     Vitals:    09/22/24 1342 09/22/24 1557 09/22/24 2035 09/23/24 0040   BP: 108/64 125/77 109/70 109/73   BP Location: Left arm Left arm Left arm Left arm   Patient Position: Lying Lying Lying Lying   Pulse: 93 96 104 102   Resp: 18 18 18 18   Temp: 36.3 °C (97.3 °F) 36 °C (96.8 °F) 36.9 °C (98.4 °F) 36.7 °C (98.1 °F)   TempSrc: Temporal Temporal Temporal Temporal   SpO2: 96% 99% 95% 93%   Weight:       Height:            Reviewed above VS with bedside Rn via phone.  VS within patient's current trends.  No concerns from RN.  No interventions by rapid response team indicated at this time.  Staff to page rapid response for any concerns or acute change in condition/VS.

## 2024-09-23 NOTE — CARE PLAN
Problem: Skin  Goal: Decreased wound size/increased tissue granulation at next dressing change  Outcome: Progressing  Flowsheets (Taken 9/22/2024 2209)  Decreased wound size/increased tissue granulation at next dressing change: Promote sleep for wound healing  Goal: Participates in plan/prevention/treatment measures  Outcome: Progressing  Flowsheets (Taken 9/22/2024 2209)  Participates in plan/prevention/treatment measures: Elevate heels  Goal: Prevent/manage excess moisture  Outcome: Progressing  Flowsheets (Taken 9/22/2024 2209)  Prevent/manage excess moisture: Cleanse incontinence/protect with barrier cream  Goal: Prevent/minimize sheer/friction injuries  Outcome: Progressing  Flowsheets (Taken 9/22/2024 2209)  Prevent/minimize sheer/friction injuries: HOB 30 degrees or less  Goal: Promote/optimize nutrition  Outcome: Progressing  Goal: Promote skin healing  Outcome: Progressing     Problem: Pain  Goal: Takes deep breaths with improved pain control throughout the shift  Outcome: Progressing  Goal: Turns in bed with improved pain control throughout the shift  Outcome: Progressing  Goal: Walks with improved pain control throughout the shift  Outcome: Progressing  Goal: Performs ADL's with improved pain control throughout shift  Outcome: Progressing  Goal: Participates in PT with improved pain control throughout the shift  Outcome: Progressing  Goal: Free from opioid side effects throughout the shift  Outcome: Progressing  Goal: Free from acute confusion related to pain meds throughout the shift  Outcome: Progressing   The patient's goals for the shift include      The clinical goals for the shift include Patient will remain safe and free from injury throughout shift

## 2024-09-24 ENCOUNTER — DOCUMENTATION (OUTPATIENT)
Dept: HOME HEALTH SERVICES | Facility: HOME HEALTH | Age: 54
End: 2024-09-24
Payer: COMMERCIAL

## 2024-09-24 LAB
ALBUMIN SERPL BCP-MCNC: 2.5 G/DL (ref 3.4–5)
ALP SERPL-CCNC: 153 U/L (ref 33–110)
ALT SERPL W P-5'-P-CCNC: 63 U/L (ref 7–45)
ANION GAP SERPL CALC-SCNC: 10 MMOL/L (ref 10–20)
AST SERPL W P-5'-P-CCNC: 39 U/L (ref 9–39)
BASOPHILS # BLD MANUAL: 0.13 X10*3/UL (ref 0–0.1)
BASOPHILS NFR BLD MANUAL: 0.9 %
BILIRUB SERPL-MCNC: 2.6 MG/DL (ref 0–1.2)
BUN SERPL-MCNC: 17 MG/DL (ref 6–23)
CALCIUM SERPL-MCNC: 8.5 MG/DL (ref 8.6–10.6)
CHLORIDE SERPL-SCNC: 104 MMOL/L (ref 98–107)
CO2 SERPL-SCNC: 30 MMOL/L (ref 21–32)
CREAT SERPL-MCNC: 1.09 MG/DL (ref 0.5–1.05)
EGFRCR SERPLBLD CKD-EPI 2021: 60 ML/MIN/1.73M*2
EOSINOPHIL # BLD MANUAL: 0 X10*3/UL (ref 0–0.7)
EOSINOPHIL NFR BLD MANUAL: 0 %
ERYTHROCYTE [DISTWIDTH] IN BLOOD BY AUTOMATED COUNT: 19.9 % (ref 11.5–14.5)
GLUCOSE SERPL-MCNC: 76 MG/DL (ref 74–99)
HCT VFR BLD AUTO: 24.2 % (ref 36–46)
HGB BLD-MCNC: 8.1 G/DL (ref 12–16)
HGB RETIC QN: 32 PG (ref 28–38)
HOWELL-JOLLY BOD BLD QL SMEAR: PRESENT
IMM GRANULOCYTES # BLD AUTO: 0.09 X10*3/UL (ref 0–0.7)
IMM GRANULOCYTES NFR BLD AUTO: 0.6 % (ref 0–0.9)
IMMATURE RETIC FRACTION: 15.6 %
INR PPP: 2.6 (ref 0.9–1.1)
LDH SERPL L TO P-CCNC: 270 U/L (ref 84–246)
LYMPHOCYTES # BLD MANUAL: 0.13 X10*3/UL (ref 1.2–4.8)
LYMPHOCYTES NFR BLD MANUAL: 0.9 %
MCH RBC QN AUTO: 31.4 PG (ref 26–34)
MCHC RBC AUTO-ENTMCNC: 33.5 G/DL (ref 32–36)
MCV RBC AUTO: 94 FL (ref 80–100)
MONOCYTES # BLD MANUAL: 1.17 X10*3/UL (ref 0.1–1)
MONOCYTES NFR BLD MANUAL: 7.9 %
NEUTS SEG # BLD MANUAL: 13.36 X10*3/UL (ref 1.2–7)
NEUTS SEG NFR BLD MANUAL: 90.3 %
NRBC BLD-RTO: 9.3 /100 WBCS (ref 0–0)
PAPPENHEIMER BOD BLD QL SMEAR: PRESENT
PLATELET # BLD AUTO: 469 X10*3/UL (ref 150–450)
POTASSIUM SERPL-SCNC: 4 MMOL/L (ref 3.5–5.3)
PROT SERPL-MCNC: 5.8 G/DL (ref 6.4–8.2)
PROTHROMBIN TIME: 29.6 SECONDS (ref 9.8–12.8)
RBC # BLD AUTO: 2.58 X10*6/UL (ref 4–5.2)
RBC MORPH BLD: ABNORMAL
RETICS #: 0.31 X10*6/UL (ref 0.02–0.08)
RETICS/RBC NFR AUTO: 11.9 % (ref 0.5–2)
SICKLE CELLS BLD QL SMEAR: ABNORMAL
SODIUM SERPL-SCNC: 140 MMOL/L (ref 136–145)
STOMATOCYTES BLD QL SMEAR: ABNORMAL
TARGETS BLD QL SMEAR: ABNORMAL
TOTAL CELLS COUNTED BLD: 114
WBC # BLD AUTO: 14.8 X10*3/UL (ref 4.4–11.3)

## 2024-09-24 PROCEDURE — 2500000001 HC RX 250 WO HCPCS SELF ADMINISTERED DRUGS (ALT 637 FOR MEDICARE OP)

## 2024-09-24 PROCEDURE — 2500000004 HC RX 250 GENERAL PHARMACY W/ HCPCS (ALT 636 FOR OP/ED)

## 2024-09-24 PROCEDURE — 1170000001 HC PRIVATE ONCOLOGY ROOM DAILY

## 2024-09-24 PROCEDURE — 83615 LACTATE (LD) (LDH) ENZYME: CPT

## 2024-09-24 PROCEDURE — 2500000002 HC RX 250 W HCPCS SELF ADMINISTERED DRUGS (ALT 637 FOR MEDICARE OP, ALT 636 FOR OP/ED)

## 2024-09-24 PROCEDURE — 80053 COMPREHEN METABOLIC PANEL: CPT

## 2024-09-24 PROCEDURE — 36415 COLL VENOUS BLD VENIPUNCTURE: CPT

## 2024-09-24 PROCEDURE — 85610 PROTHROMBIN TIME: CPT

## 2024-09-24 PROCEDURE — 99233 SBSQ HOSP IP/OBS HIGH 50: CPT

## 2024-09-24 PROCEDURE — 85045 AUTOMATED RETICULOCYTE COUNT: CPT

## 2024-09-24 PROCEDURE — 85027 COMPLETE CBC AUTOMATED: CPT

## 2024-09-24 PROCEDURE — 85007 BL SMEAR W/DIFF WBC COUNT: CPT

## 2024-09-24 ASSESSMENT — COGNITIVE AND FUNCTIONAL STATUS - GENERAL
DAILY ACTIVITIY SCORE: 17
EATING MEALS: A LITTLE
MOVING FROM LYING ON BACK TO SITTING ON SIDE OF FLAT BED WITH BEDRAILS: A LITTLE
MOBILITY SCORE: 14
TURNING FROM BACK TO SIDE WHILE IN FLAT BAD: A LITTLE
DRESSING REGULAR UPPER BODY CLOTHING: TOTAL
WALKING IN HOSPITAL ROOM: TOTAL
PERSONAL GROOMING: A LOT
DRESSING REGULAR UPPER BODY CLOTHING: A LOT
CLIMB 3 TO 5 STEPS WITH RAILING: A LOT
STANDING UP FROM CHAIR USING ARMS: A LOT
HELP NEEDED FOR BATHING: A LOT
MOVING FROM LYING ON BACK TO SITTING ON SIDE OF FLAT BED WITH BEDRAILS: A LOT
HELP NEEDED FOR BATHING: A LOT
WALKING IN HOSPITAL ROOM: A LOT
TURNING FROM BACK TO SIDE WHILE IN FLAT BAD: A LOT
TOILETING: TOTAL
MOVING TO AND FROM BED TO CHAIR: TOTAL
TOILETING: A LOT
DRESSING REGULAR LOWER BODY CLOTHING: A LOT
DAILY ACTIVITIY SCORE: 11
MOVING TO AND FROM BED TO CHAIR: A LOT
DRESSING REGULAR LOWER BODY CLOTHING: A LITTLE
CLIMB 3 TO 5 STEPS WITH RAILING: TOTAL
STANDING UP FROM CHAIR USING ARMS: TOTAL
MOBILITY SCORE: 8

## 2024-09-24 ASSESSMENT — PAIN - FUNCTIONAL ASSESSMENT
PAIN_FUNCTIONAL_ASSESSMENT: 0-10

## 2024-09-24 ASSESSMENT — PAIN SCALES - GENERAL
PAINLEVEL_OUTOF10: 8
PAINLEVEL_OUTOF10: 6
PAINLEVEL_OUTOF10: 8
PAINLEVEL_OUTOF10: 7
PAINLEVEL_OUTOF10: 8
PAINLEVEL_OUTOF10: 6
PAINLEVEL_OUTOF10: 9
PAINLEVEL_OUTOF10: 9
PAINLEVEL_OUTOF10: 8
PAINLEVEL_OUTOF10: 7
PAINLEVEL_OUTOF10: 8
PAINLEVEL_OUTOF10: 6

## 2024-09-24 ASSESSMENT — PAIN DESCRIPTION - DESCRIPTORS
DESCRIPTORS: ACHING

## 2024-09-24 NOTE — PROGRESS NOTES
Physical Therapy                 Therapy Communication Note    Patient Name: Senait Narvaez  MRN: [id number]  Department: [facility]  Room: [address] [place] [address]  Today's Date: 9/24/2024     Discipline: Physical Therapy    Missed Visit Reason: Missed Visit Reason: Patient refused    Missed Time: Attempt    Comment: Pt politely declined stating not feeling well.

## 2024-09-24 NOTE — SIGNIFICANT EVENT
Rapid Response RN Note    Rapid response RN for RADAR score 7 due to the recent VS listed below:     Vitals:    09/23/24 1311 09/23/24 1632 09/23/24 2023 09/24/24 0014   BP: 117/71 103/65 124/88 96/61   BP Location: Right arm Right arm Left arm Left arm   Patient Position: Lying Lying Lying Lying   Pulse: 75 101 105 97   Resp: 18 18 18 18   Temp: 36.7 °C (98.1 °F) 36.6 °C (97.9 °F) 36.7 °C (98.1 °F) 37.2 °C (99 °F)   TempSrc: Temporal Temporal Temporal Temporal   SpO2: 96% 98% 96% 93%   Weight:       Height:            Reviewed above VS with bedside Rn via phone.  No concerns from RN.  No interventions by rapid response team indicated at this time.      Staff to page rapid response for any concerns or acute change in condition/VS.

## 2024-09-24 NOTE — PROGRESS NOTES
Senait Narvaez is a 54 y.o. female on day 14 of admission presenting with Sickle cell disease with crisis and other complication.      Subjective   NAEO. Patient reported pain is stable. Denies any fever/chills, N/V, change in appetite, chest pain, SOB, or abdominal pain. Patient reported that rash is similar from prior.        Objective     Last Recorded Vitals  /59 (BP Location: Left arm, Patient Position: Lying)   Pulse 105   Temp 36.9 °C (98.4 °F) (Temporal)   Resp 16   Wt 120 kg (264 lb 12.4 oz)   SpO2 97%   Intake/Output last 3 Shifts:    Intake/Output Summary (Last 24 hours) at 9/24/2024 0983  Last data filed at 9/24/2024 0762  Gross per 24 hour   Intake --   Output 1250 ml   Net -1250 ml     Admission Weight  Weight: 110 kg (242 lb 8.1 oz) (09/10/24 2000)    Daily Weight  09/17/24 : 120 kg (264 lb 12.4 oz)      Physical Exam  Constitutional:       General: She is not in acute distress.  HENT:      Head: Normocephalic and atraumatic.      Nose: Nose normal.      Mouth/Throat:      Mouth: Mucous membranes are moist.      Pharynx: Oropharynx is clear.   Eyes:      General: No scleral icterus.     Extraocular Movements: Extraocular movements intact.      Conjunctiva/sclera: Conjunctivae normal.      Pupils: Pupils are equal, round, and reactive to light.   Cardiovascular:      Rate and Rhythm: Normal rate and regular rhythm.      Pulses: Normal pulses.      Heart sounds: Normal heart sounds.   Pulmonary:      Effort: Pulmonary effort is normal. No respiratory distress.      Breath sounds: Normal breath sounds.   Abdominal:      General: Abdomen is flat. There is no distension.      Palpations: Abdomen is soft.      Tenderness: There is no abdominal tenderness.   Musculoskeletal:         General: Normal range of motion.      Cervical back: Normal range of motion and neck supple.      Right lower leg: No edema.      Left lower leg: No edema.   Skin:     General: Skin is warm and dry.      Comments:  Scattered, purple plaques across forehead and chest   Neurological:      General: No focal deficit present.      Mental Status: She is alert and oriented to person, place, and time.   Psychiatric:         Mood and Affect: Mood normal.         Behavior: Behavior normal.       Relevant Results  Results for orders placed or performed during the hospital encounter of 09/10/24 (from the past 24 hour(s))   Lactate dehydrogenase   Result Value Ref Range     (H) 84 - 246 U/L   Reticulocytes   Result Value Ref Range    Retic % 11.9 (H) 0.5 - 2.0 %    Retic Absolute 0.308 (H) 0.018 - 0.083 x10*6/uL    Reticulocyte Hemoglobin 32 28 - 38 pg    Immature Retic fraction 15.6 <=16.0 %   Comprehensive metabolic panel   Result Value Ref Range    Glucose 76 74 - 99 mg/dL    Sodium 140 136 - 145 mmol/L    Potassium 4.0 3.5 - 5.3 mmol/L    Chloride 104 98 - 107 mmol/L    Bicarbonate 30 21 - 32 mmol/L    Anion Gap 10 10 - 20 mmol/L    Urea Nitrogen 17 6 - 23 mg/dL    Creatinine 1.09 (H) 0.50 - 1.05 mg/dL    eGFR 60 (L) >60 mL/min/1.73m*2    Calcium 8.5 (L) 8.6 - 10.6 mg/dL    Albumin 2.5 (L) 3.4 - 5.0 g/dL    Alkaline Phosphatase 153 (H) 33 - 110 U/L    Total Protein 5.8 (L) 6.4 - 8.2 g/dL    AST 39 9 - 39 U/L    Bilirubin, Total 2.6 (H) 0.0 - 1.2 mg/dL    ALT 63 (H) 7 - 45 U/L   Protime-INR   Result Value Ref Range    Protime 29.6 (H) 9.8 - 12.8 seconds    INR 2.6 (H) 0.9 - 1.1   CBC and Auto Differential   Result Value Ref Range    WBC 14.8 (H) 4.4 - 11.3 x10*3/uL    nRBC 9.3 (H) 0.0 - 0.0 /100 WBCs    RBC 2.58 (L) 4.00 - 5.20 x10*6/uL    Hemoglobin 8.1 (L) 12.0 - 16.0 g/dL    Hematocrit 24.2 (L) 36.0 - 46.0 %    MCV 94 80 - 100 fL    MCH 31.4 26.0 - 34.0 pg    MCHC 33.5 32.0 - 36.0 g/dL    RDW 19.9 (H) 11.5 - 14.5 %    Platelets 469 (H) 150 - 450 x10*3/uL    Immature Granulocytes %, Automated 0.6 0.0 - 0.9 %    Immature Granulocytes Absolute, Automated 0.09 0.00 - 0.70 x10*3/uL   Manual Differential   Result Value Ref Range     Neutrophils %, Manual 90.3 40.0 - 80.0 %    Lymphocytes %, Manual 0.9 13.0 - 44.0 %    Monocytes %, Manual 7.9 2.0 - 10.0 %    Eosinophils %, Manual 0.0 0.0 - 6.0 %    Basophils %, Manual 0.9 0.0 - 2.0 %    Seg Neutrophils Absolute, Manual 13.36 (H) 1.20 - 7.00 x10*3/uL    Lymphocytes Absolute, Manual 0.13 (L) 1.20 - 4.80 x10*3/uL    Monocytes Absolute, Manual 1.17 (H) 0.10 - 1.00 x10*3/uL    Eosinophils Absolute, Manual 0.00 0.00 - 0.70 x10*3/uL    Basophils Absolute, Manual 0.13 (H) 0.00 - 0.10 x10*3/uL    Total Cells Counted 114     RBC Morphology See Below     Sickle Cells Few     Target Cells Many     Stomatocytes Few     Rhodes-Jolly Bodies Present     Pappenheimer Bodies Present      *Note: Due to a large number of results and/or encounters for the requested time period, some results have not been displayed. A complete set of results can be found in Results Review.         Imaging:  XR CHEST 1 VIEW; 9/21/2024 10:50 am   IMPRESSION:  1.  There is diffuse perihilar interstitial prominence and correlate  with fluid status or atypical infection. Minimal left basilar  atelectasis/effusion. If there is continued concern for pneumonia,  follow-up with PA and lateral x-ray would be helpful.  2. Prominence of the central pulmonary arteries and correlate with a  component of pulmonary artery hypertension..    XR ABDOMEN 1 VIEW; 9/21/2024 9:57 am  IMPRESSION:  1.  Nonobstructive bowel gas pattern.      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 Patient was  subsequently intubated due to worsening lethargic and lack of respiratory compensation from significant metabolic acidosis with concern for ACS. Vancomycin and Zosyn started for empiric coverage, patient also started on pressors for BP support. Patient was  transfused 2 units pRBCs and then underwent RBCEx on 9/11. Cardiology consulted for c/f NSTEMI vs demand ischemia (ST depressions and elevated troponin), rec treat as ACS. Course further c/b severe CHARLES with peak sCr of 5.4, and acute liver injury with severely elevated liver enzymes (ALT 4119, AST >10k). Liver US only remarkable for fatty infiltration, viral hepatitis panel negative. CHARLES and liver injury improved with supportive management. Pruritic rash noted 9/14, Derm consulted and took multiple biopsies 9/15. Rash then began to worsen to involve the groin, lateral thigh, and scalp with numerous tense bullae. Discontinued heparin given concerns for potential HIT, started bivalirudin. Patient was extubated on 9/16/2024. Started stress dose steroids given continued pressor requirements, pressors Dc'd 9/17. Patient transferred to [facility] 9/17, PT/OT rec SNF. Coumadin bridge was started 9/18. Given persistent rash with unremarkable labs, derm re-biopsied on 9/18, findings ultimately felt to be most c/w fixed drug eruption. INR 3.8, bival and home warfarin stopped, repeat INR 9/21 3.2, per pharm recheck INR 9/22. Dermatology contacted for bx results (9/20) rec triamcinolone cream BID and interdry cloths. Leukocytosis uptrending 9/21, infectious workup started. DC pending improved pain and infectious workup.    Updates 9/24:  -INR 2.6 after restarting Warfarin 5mg, will maintain INR goal of 2-3, Home dose 5mg Warfarin daily and 7.5 mg on Tuesday  -Sickle cell crisis pain currently stable  -Patient now willing to go to SNF, SW placed referral yesterday, will trial stopping Dilaudid tomorrow for SNF placement    #Purpuric rash, suspected fixed drug eruption  #Concern for  possible heparin-induced thrombocytopenia (HIT)-resolved  -Purple, reticular pattern of plaques noted around the bilateral breasts, scalp and groin,  with tense bullae. Non-erythematous, no purulence  - Some initial concern for possible heparin-induced thrombocytopenia with concurrent skin findings. Discontinued heparin 9/16 and transitioned to bivalirudin  -4 T score 0, PF4 w/ reflex BINA negative. Hematology ultimately consulted, felt there was low concern for HIT and signed off  -Differential diagnosis includes purpura secondary to infection versus coagulopathy versus vasculopathy versus small and/or medium vessel vasculitis versus calciphylaxis vs drug rash  -Dermatology consulted, work-up to date:  - Low Protein C activity  - ANCA, PR3 and MPO negative  - ISABELLE neg  - cryoglobulin labs- not enough specimen to analyze  - S/p skin punch biopsies x 2 9/15 and one on 9/18, showed SUPERFICIAL EPIDERMAL DEGENERATION WITH ERYTHROCYTE EXTRAVASATION WITH NEUTROPHILS. These findings could be seen secondary to an older trauma, or resolving erythema multiforme, a fixed drug eruption, Edgar-Christopher syndrome, or toxic epidermal necrolysis. Favor Multifocal Fixed Drug Eruption    - Dermatology contacted (9/20) about results, recommended adding vancomycin to allergy list and starting triamcinolone    PLAN:   - Continue triamcinolone 0.1% cream BID (9/20-current) per derm recs  - Plan for suture removal around 9/25-9/27     #Leukocytosis (Improving)  - Likely I/s/o rash vs reactive vs infectious process  - leukocytosis noted on admission to MICU (WBC 24.8)  - leukocytosis improved throughout hospital stay however started uptrending 9/20  - Blood cultures 9/10, 9/12, 9/15 all NGTD  - Strep and Legionella Ag, MRSA nares negative  - CXR (9/21) largely unremarkable, again re-demonstrated perihilar prominence seen on prior XR  - Abdominal xray (9/21) unremarkable  - Repeat blood cultures x 2 (9/21) pending  - UA (9/21) with 500 LE,  urine cx pending    PLAN:  -AM labs 9/23 pending, will continue to monitor      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  # ACS  - Previously managed through routine RBC exchanges q6 weeks, most recent RBCEx 3/1/24, per patient pausing on transfusions for time being  - OARRS reviewed, no aberrant behavior noted  - LDH>12,000 (now downtrending), haptoglobin <30, fibrinogen 318, retic % 5.2 on presentation, bili 10.7  - leukocytosis 24.8 on admission to MICU  - CXR (9/10) persistent atelectasis/scarring in the left lower lung  - CT CAP (9/11) Diffuse mosaic attenuation of the lung parenchyma, which can be seen in the setting of small airway disease, pulmonary edema or acute infection  - intubated upon arrival to MICU, now s/p extubation 9/16  - started on vanc and zosyn (finished both courses prior to floor transfer)  - Procal elevated 7.09 (9/11)  - Blood cultures 9/10, 9/12, 9/15 all NGTD  - Strep and Legionella Ag, MRSA nares negative  - trop peak 1431, cards rec treat as ACS (see below)  - S/p 2u pRBCs 9/10 on arrival to MICU  - S/p exchange transfusion 9/11 AM for ACS  - Care plan from 12/6/23- For mild to moderate pain: Dilaudid 0.5mg IVP q3h as needed, for moderate to severe pain Dilaudid 1- 1.5mg q3h PRN  - On arrival to [facility], started on 0.6mg dilaudid IVP q3h PRN severe pain     PLAN:  -Pain currently stable  -Continue oxycodone 10 mg q4h PRN for moderate pain, 0.6 mg dilaudid IV q3h PRN for severe pain and   - Bowel regimen for opioid induced constipation, zofran for opioid induced nausea, and benadrly for opioid induced pruritus  - c/w home Duloxetine 40mg daily, PO Zofran PRN, Folic Acid 1mg daily, and MVI daily    # ST depression with tropinemia, suspected 2/2 demand ischemia iso ACS  # Hx SVT  - Elevated troponins on admission, peak 1431 and now downtrending   - EKG with ST depressions, likely Type II MI due to vaso-occlusive crisis and anemia  - TTE 9/11: EF 60-65%, RV enlarged and reduced in function which appears  stable when compared to last TTE (1/2024)  - Troponin leak is likely due to cardiac demand vs supply mismatch in the setting of worsening anemia and vaso-occlusive crisis  - Cardiology consulted, now signed off with indication to treat troponemia as ACS    PLAN:    - Lasix held in MICU 2/2 hypotension and CHARLES, can resume as able   - Follow-up with Cardiology outpt, FUV 2/13/25     # CHARLES on CKD II, improving  - Baseline ~ SCr <2, peak 5.4 this admission. 9/21 sCr 1.58  - likely mixed pre-renal/intra-renal, on CKD II, now polyuric phase  - FeNa 1.2%, consistent with ATN  - Severe hyperkalemia with previous peaked T waves on EKG, resolved  - Nephrology followed, now signed off, no need for HD  - s/p multiple K cocktails in MICU  - encourage increased PO intake     # Acute liver injury, improving  # Direct hyperbilirubinuria, improving  - Suspected 2/2 hemodynamic instability versus sickle cell crisis  - On presentation to  MICU ALT 2611, AST 4727, T bili 10.2, INR 3.0; continues to improve  - CT with hepatic venous congestion, patient lacks gallbladder  - Liver US with Doppler: Diffuse fatty infiltration, unremarkable doppler interrogation of the liver  - EBV IgG positive, IgM negative  - CMV IgG positive, PCR negative  - Acute hepatitis panel unremarkable  - Hepatology initially following, now signed off  -Will continue to monitor, trend daily CMP     # pHTN   - Initial c/f CTEPH as imaging findings with dilated PA, filling defects, and mosaicism  - VQ scan (6/13/23) with non anatomical basal defects and RUL defect due to scar--> not favoring CTEPH per Dr. Yi and Dr. 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 On lifelong anticoagulation, DOAC discouraged due to high risk of clotting   - home Coumadin 5mg daily initially HELD on admission to MICU 2/2 unstable course  - restarted coumadin 9/18 PM, will plan to bridge with bival, Ok'd by heme   - 9/20 INR 3.8, bival and home warfarin held. Pharmacy rec repeat INR 4 hours after bival was held. If the INR < 2, we can restart the bival, but if the INR 2-3, we can continue with warfarin monotherapy   - 9/20 repeat INR 3.7, pharmacy rec continuing to hold both medications  - 9/21 INR 3.2, per pharmacy - get repeat INR in AM 9/22, if INR between 2-3 can restart home warfarin 5mg daily    PLAN:  -Started Warfarin 5mg daily per Pharm recs, INR pending tomorrow AM  - Follows with Coumadin clinic outpatient  - Caution with fluids      # CAN  - cont home CPAP   - cont home Albuterol PRN     # H/O Cauda Equine syndrome  - Follows Dr. Delacruz as outpatient  - no current issues      # DISPO  - Full code- confirmed on admission  - NOK: Tati Salgado (mother) (#[phone])  - PT/OT rec SNF, pending choice/placement  - FUV PCP 10/1, Anim 10/7, Cardiology 2/13            Asael Irby MD

## 2024-09-24 NOTE — CARE PLAN
The patient's goals for the shift include      Problem: Safety - Medical Restraint  Goal: Remains free of injury from restraints (Restraint for Interference with Medical Device)  Outcome: Progressing  Goal: Free from restraint(s) (Restraint for Interference with Medical Device)  Outcome: Progressing     Problem: Pain - Adult  Goal: Verbalizes/displays adequate comfort level or baseline comfort level  Outcome: Progressing     Problem: Safety - Adult  Goal: Free from fall injury  Outcome: Progressing     Problem: Skin  Goal: Decreased wound size/increased tissue granulation at next dressing change  Outcome: Progressing  Goal: Participates in plan/prevention/treatment measures  Outcome: Progressing  Goal: Prevent/manage excess moisture  Outcome: Progressing  Goal: Prevent/minimize sheer/friction injuries  9/24/2024 1407 by Melia Hernández RN  Outcome: Progressing  9/24/2024 1407 by Melia Hernández RN  Flowsheets (Taken 9/24/2024 1407)  Prevent/minimize sheer/friction injuries:   HOB 30 degrees or less   Turn/reposition every 2 hours/use positioning/transfer devices   Use pull sheet  Goal: Promote/optimize nutrition  Outcome: Progressing  Goal: Promote skin healing  Outcome: Progressing     Problem: Knowledge Deficit  Goal: Patient/family/caregiver demonstrates understanding of disease process, treatment plan, medications, and discharge instructions  Outcome: Progressing     Problem: Mechanical Ventilation  Goal: Patient Will Maintain Patent Airway  Outcome: Progressing  Goal: Oral health is maintained or improved  Outcome: Progressing  Goal: Tracheostomy will be managed safely  Outcome: Progressing  Goal: ET tube will be managed safely  Outcome: Progressing  Goal: Ability to express needs and understand communication  Outcome: Progressing  Goal: Mobility/activity is maintained at optimum level for patient  Outcome: Progressing     Problem: Pain  Goal: Takes deep breaths with improved pain control throughout the  shift  Outcome: Progressing  Goal: Turns in bed with improved pain control throughout the shift  Outcome: Progressing  Goal: Walks with improved pain control throughout the shift  Outcome: Progressing  Goal: Performs ADL's with improved pain control throughout shift  Outcome: Progressing  Goal: Participates in PT with improved pain control throughout the shift  Outcome: Progressing  Goal: Free from opioid side effects throughout the shift  Outcome: Progressing  Goal: Free from acute confusion related to pain meds throughout the shift  Outcome: Progressing     Problem: Nutrition  Goal: Less than 5 days NPO/clear liquids  Outcome: Progressing  Goal: Oral intake greater than 50%  Outcome: Progressing  Goal: Oral intake greater 75%  Outcome: Progressing  Goal: Consume prescribed supplement  Outcome: Progressing  Goal: Adequate PO fluid intake  Outcome: Progressing  Goal: Nutrition support goals are met within 48 hrs  Outcome: Progressing  Goal: Nutrition support is meeting 75% of nutrient needs  Outcome: Progressing  Goal: Tube feed tolerance  Outcome: Progressing  Goal: BG  mg/dL  Outcome: Progressing  Goal: Lab values WNL  Outcome: Progressing  Goal: Electrolytes WNL  Outcome: Progressing  Goal: Promote healing  Outcome: Progressing  Goal: Maintain stable weight  Outcome: Progressing  Goal: Reduce weight from edema/fluid  Outcome: Progressing  Goal: Gradual weight gain  Outcome: Progressing  Goal: Improve ostomy output  Outcome: Progressing     Problem: Fall/Injury  Goal: Not fall by end of shift  Outcome: Progressing  Goal: Be free from injury by end of the shift  Outcome: Progressing  Goal: Verbalize understanding of personal risk factors for fall in the hospital  Outcome: Progressing  Goal: Verbalize understanding of risk factor reduction measures to prevent injury from fall in the home  Outcome: Progressing  Goal: Use assistive devices by end of the shift  Outcome: Progressing  Goal: Pace activities to  prevent fatigue by end of the shift  Outcome: Progressing     The clinical goals for the shift include pt will report pain score less than 8/10 throughout shift.

## 2024-09-24 NOTE — PROGRESS NOTES
09/24/24 1300   Discharge Planning   Who is requesting discharge planning? Patient   Home or Post Acute Services Post acute facilities (Rehab/SNF/etc)   Type of Post Acute Facility Services Skilled nursing   Expected Discharge Disposition SNF     Care Transitions Note  9/24/2024  Met with Senait and mother Tati ([phone]) at bedside to discuss discharge planning. [facility] is able to accept as long as patient is off IV pain meds. Team aware, plan was to wean off IV dilaudid tomorrow per rounds this morning. Will send updated clinicals from today.    Senait and Tati voiced concerns about Senait's rash. Tati would like the rash to improve before going to SNF. Tati states Dr. Whaley plans to check in with them. This LSW sent message to TEVIN Sahni outpatient SW to update her. Tati had questions about SNF providing injections or oral pain meds, LSW sent message to [facility] for more info. Mother also adds that Senait needs to be admitted for exchanges every 3-4 weeks, wonders how this would be handled if still at SNF. LSW will update team and follow for needs that arise.   YULI Zamorano

## 2024-09-25 LAB
ALBUMIN SERPL BCP-MCNC: 2.7 G/DL (ref 3.4–5)
ALP SERPL-CCNC: 168 U/L (ref 33–110)
ALT SERPL W P-5'-P-CCNC: 57 U/L (ref 7–45)
ANION GAP SERPL CALC-SCNC: 12 MMOL/L (ref 10–20)
AST SERPL W P-5'-P-CCNC: 43 U/L (ref 9–39)
BACTERIA BLD CULT: NORMAL
BASOPHILS # BLD MANUAL: 0 X10*3/UL (ref 0–0.1)
BASOPHILS NFR BLD MANUAL: 0 %
BILIRUB SERPL-MCNC: 2.5 MG/DL (ref 0–1.2)
BUN SERPL-MCNC: 16 MG/DL (ref 6–23)
CALCIUM SERPL-MCNC: 8.8 MG/DL (ref 8.6–10.6)
CHLORIDE SERPL-SCNC: 103 MMOL/L (ref 98–107)
CO2 SERPL-SCNC: 31 MMOL/L (ref 21–32)
CREAT SERPL-MCNC: 1.13 MG/DL (ref 0.5–1.05)
EGFRCR SERPLBLD CKD-EPI 2021: 58 ML/MIN/1.73M*2
EOSINOPHIL # BLD MANUAL: 0 X10*3/UL (ref 0–0.7)
EOSINOPHIL NFR BLD MANUAL: 0 %
ERYTHROCYTE [DISTWIDTH] IN BLOOD BY AUTOMATED COUNT: 19.9 % (ref 11.5–14.5)
GLUCOSE SERPL-MCNC: 71 MG/DL (ref 74–99)
HCT VFR BLD AUTO: 28.2 % (ref 36–46)
HGB BLD-MCNC: 9.1 G/DL (ref 12–16)
HGB RETIC QN: 29 PG (ref 28–38)
IMM GRANULOCYTES # BLD AUTO: 0.25 X10*3/UL (ref 0–0.7)
IMM GRANULOCYTES NFR BLD AUTO: 2 % (ref 0–0.9)
IMMATURE RETIC FRACTION: 12.3 %
INR PPP: 3.4 (ref 0.9–1.1)
LDH SERPL L TO P-CCNC: 291 U/L (ref 84–246)
LYMPHOCYTES # BLD MANUAL: 0.88 X10*3/UL (ref 1.2–4.8)
LYMPHOCYTES NFR BLD MANUAL: 6.9 %
MCH RBC QN AUTO: 30.6 PG (ref 26–34)
MCHC RBC AUTO-ENTMCNC: 32.3 G/DL (ref 32–36)
MCV RBC AUTO: 95 FL (ref 80–100)
MONOCYTES # BLD MANUAL: 0.66 X10*3/UL (ref 0.1–1)
MONOCYTES NFR BLD MANUAL: 5.2 %
NEUTROPHILS # BLD MANUAL: 11.05 X10*3/UL (ref 1.2–7.7)
NEUTS BAND # BLD MANUAL: 0.22 X10*3/UL (ref 0–0.7)
NEUTS BAND NFR BLD MANUAL: 1.7 %
NEUTS SEG # BLD MANUAL: 10.83 X10*3/UL (ref 1.2–7)
NEUTS SEG NFR BLD MANUAL: 85.3 %
NRBC BLD-RTO: 8 /100 WBCS (ref 0–0)
PLATELET # BLD AUTO: 459 X10*3/UL (ref 150–450)
POLYCHROMASIA BLD QL SMEAR: ABNORMAL
POTASSIUM SERPL-SCNC: 4.7 MMOL/L (ref 3.5–5.3)
PROT SERPL-MCNC: 6.2 G/DL (ref 6.4–8.2)
PROTHROMBIN TIME: 38.9 SECONDS (ref 9.8–12.8)
RBC # BLD AUTO: 2.97 X10*6/UL (ref 4–5.2)
RBC MORPH BLD: ABNORMAL
RETICS #: 0.26 X10*6/UL (ref 0.02–0.08)
RETICS/RBC NFR AUTO: 8.6 % (ref 0.5–2)
SODIUM SERPL-SCNC: 141 MMOL/L (ref 136–145)
TARGETS BLD QL SMEAR: ABNORMAL
TOTAL CELLS COUNTED BLD: 116
VARIANT LYMPHS # BLD MANUAL: 0.11 X10*3/UL (ref 0–0.5)
VARIANT LYMPHS NFR BLD: 0.9 %
WBC # BLD AUTO: 12.7 X10*3/UL (ref 4.4–11.3)

## 2024-09-25 PROCEDURE — 85007 BL SMEAR W/DIFF WBC COUNT: CPT

## 2024-09-25 PROCEDURE — 36415 COLL VENOUS BLD VENIPUNCTURE: CPT

## 2024-09-25 PROCEDURE — 99233 SBSQ HOSP IP/OBS HIGH 50: CPT | Performed by: STUDENT IN AN ORGANIZED HEALTH CARE EDUCATION/TRAINING PROGRAM

## 2024-09-25 PROCEDURE — 2500000001 HC RX 250 WO HCPCS SELF ADMINISTERED DRUGS (ALT 637 FOR MEDICARE OP)

## 2024-09-25 PROCEDURE — 99233 SBSQ HOSP IP/OBS HIGH 50: CPT

## 2024-09-25 PROCEDURE — 97110 THERAPEUTIC EXERCISES: CPT | Mod: GP

## 2024-09-25 PROCEDURE — 85610 PROTHROMBIN TIME: CPT

## 2024-09-25 PROCEDURE — 2500000004 HC RX 250 GENERAL PHARMACY W/ HCPCS (ALT 636 FOR OP/ED)

## 2024-09-25 PROCEDURE — 85045 AUTOMATED RETICULOCYTE COUNT: CPT

## 2024-09-25 PROCEDURE — 83615 LACTATE (LD) (LDH) ENZYME: CPT

## 2024-09-25 PROCEDURE — 80053 COMPREHEN METABOLIC PANEL: CPT

## 2024-09-25 PROCEDURE — 85027 COMPLETE CBC AUTOMATED: CPT

## 2024-09-25 PROCEDURE — 1170000001 HC PRIVATE ONCOLOGY ROOM DAILY

## 2024-09-25 RX ORDER — HYDROMORPHONE HYDROCHLORIDE 1 MG/ML
0.6 INJECTION, SOLUTION INTRAMUSCULAR; INTRAVENOUS; SUBCUTANEOUS EVERY 4 HOURS PRN
Status: DISCONTINUED | OUTPATIENT
Start: 2024-09-25 | End: 2024-09-26

## 2024-09-25 RX ORDER — PETROLATUM 420 MG/G
OINTMENT TOPICAL 2 TIMES DAILY
Status: DISPENSED | OUTPATIENT
Start: 2024-09-25

## 2024-09-25 ASSESSMENT — COGNITIVE AND FUNCTIONAL STATUS - GENERAL
MOVING TO AND FROM BED TO CHAIR: A LOT
MOVING FROM LYING ON BACK TO SITTING ON SIDE OF FLAT BED WITH BEDRAILS: A LITTLE
MOBILITY SCORE: 11
WALKING IN HOSPITAL ROOM: TOTAL
DRESSING REGULAR LOWER BODY CLOTHING: A LITTLE
STANDING UP FROM CHAIR USING ARMS: A LOT
STANDING UP FROM CHAIR USING ARMS: A LOT
MOVING FROM LYING ON BACK TO SITTING ON SIDE OF FLAT BED WITH BEDRAILS: A LITTLE
DAILY ACTIVITIY SCORE: 17
DRESSING REGULAR UPPER BODY CLOTHING: A LOT
TURNING FROM BACK TO SIDE WHILE IN FLAT BAD: A LITTLE
WALKING IN HOSPITAL ROOM: A LOT
TOILETING: A LOT
CLIMB 3 TO 5 STEPS WITH RAILING: TOTAL
HELP NEEDED FOR BATHING: A LOT
MOVING TO AND FROM BED TO CHAIR: A LOT
TURNING FROM BACK TO SIDE WHILE IN FLAT BAD: A LOT
MOBILITY SCORE: 14
CLIMB 3 TO 5 STEPS WITH RAILING: A LOT

## 2024-09-25 ASSESSMENT — PAIN SCALES - GENERAL
PAINLEVEL_OUTOF10: 9
PAINLEVEL_OUTOF10: 6
PAINLEVEL_OUTOF10: 9
PAINLEVEL_OUTOF10: 6
PAINLEVEL_OUTOF10: 8
PAINLEVEL_OUTOF10: 9
PAINLEVEL_OUTOF10: 8
PAINLEVEL_OUTOF10: 10 - WORST POSSIBLE PAIN
PAINLEVEL_OUTOF10: 6
PAINLEVEL_OUTOF10: 9
PAINLEVEL_OUTOF10: 6
PAINLEVEL_OUTOF10: 9
PAINLEVEL_OUTOF10: 8
PAINLEVEL_OUTOF10: 8

## 2024-09-25 ASSESSMENT — PAIN - FUNCTIONAL ASSESSMENT
PAIN_FUNCTIONAL_ASSESSMENT: 0-10

## 2024-09-25 ASSESSMENT — PAIN DESCRIPTION - DESCRIPTORS: DESCRIPTORS: ACHING

## 2024-09-25 NOTE — ASSESSMENT & PLAN NOTE
Senait Narvaez is a 53 year-old female with a PMHx of Hgb SC disease (previously on exchange transfusions q4-6 weeks, last transfusion 3/1/24), hx of SVC syndrome, recurrent DVT/PE (on lifelong Coumadin), pHTN (6/2023), cauda equina with saddle numbness, hx SVT, fibromyalgia, Raynaud's disease, CKD II (baseline SCr~<2) and CAN who presented to [facility] ED for diffuse pain and lethargy, then transferred to  MICU for hemodynamic instability. Patient was subsequently intubated due to worsening lethargic and lack of respiratory compensation from significant metabolic acidosis with concern for ACS. Vancomycin and Zosyn started for empiric coverage, patient also started on pressors for BP support. Patient was  transfused 2 units pRBCs and then underwent RBCEx on 9/11. Cardiology consulted for c/f NSTEMI vs demand ischemia (ST depressions and elevated troponin), rec treat as ACS. Course further c/b severe CHARLES with peak sCr of 5.4, and acute liver injury with severely elevated liver enzymes (ALT 4119, AST >10k). Liver US only remarkable for fatty infiltration, viral hepatitis panel negative. CHARLES and liver injury improved with supportive management. Pruritic rash noted 9/14, Derm consulted and took multiple biopsies 9/15. Rash then began to worsen to involve the groin, lateral thigh, and scalp with numerous tense bullae. Discontinued heparin given concerns for potential HIT, started bivalirudin. Patient was extubated on 9/16/2024. Started stress dose steroids given continued pressor requirements, pressors Dc'd 9/17. Patient transferred to [facility] 9/17, PT/OT rec SNF. Coumadin bridge was started 9/18. Given persistent rash with unremarkable labs, derm re-biopsied on 9/18, findings ultimately felt to be most c/w fixed drug eruption. Dermatology contacted for bx results (9/20) rec triamcinolone cream BID and interdry cloths. Leukocytosis uptrending 9/21, repeat infectious workup negative (CXR, Abd XR, blood cultures, MRSA negative 9/21).  UA with leuks, urine culture pending (9/21). For warfarin dosing, per pharmacy, if INR between 2-3 can restart home warfarin 5mg daily, if INR < 2 restart bival. INR 3.4 on 9/25, warfarin stopped, repeat INR pending 9/26. On 9/25, per nursing and patient, rash seems to be worsening. Derm re-engaged and wound care re-consulted, recs pending. DC pending improved pain and infectious workup.     #Purpuric rash, suspected fixed drug eruption  #Concern for possible heparin-induced thrombocytopenia (HIT) -resolved  -Purple, reticular pattern of plaques noted around the bilateral breasts, scalp and groin,  with tense bullae. Non-erythematous, no purulence  - Some initial concern for possible heparin-induced thrombocytopenia with concurrent skin findings. Discontinued heparin 9/16 and transitioned to bivalirudin  -4 T score 0, PF4 w/ reflex BINA negative. Hematology ultimately consulted, felt there was low concern for HIT and signed off  -Differential diagnosis includes purpura secondary to infection versus coagulopathy versus vasculopathy versus small and/or medium vessel vasculitis versus calciphylaxis vs drug rash  -Dermatology consulted, work-up to date:  - Low Protein C activity  - ANCA, PR3 and MPO negative  - ISABELLE neg  - cryoglobulin labs- not enough specimen to analyze  - S/p skin punch biopsies x 2 9/15 and one on 9/18, showed SUPERFICIAL EPIDERMAL DEGENERATION WITH ERYTHROCYTE EXTRAVASATION WITH NEUTROPHILS. These findings could be seen secondary to an older trauma, or resolving erythema multiforme, a fixed drug eruption, Edgar-Christopher syndrome, or toxic epidermal necrolysis. Favor Multifocal Fixed Drug Eruption    - Dermatology contacted (9/20) about results, recommended adding vancomycin to allergy list and starting triamcinolone  - Continue triamcinolone 0.1% cream BID (9/20-current) per derm recs  - Plan for suture removal around 9/25-9/27  - On 9/25, concern from pt and nursing that rash is not improving.  Under breasts and groin still causing pain. Bilateral upper thighs and forehead rash improving   - Wound care re-consulted 9/25   - Re-engaged dermatology on 9/25 given worsening rash; recs pending      #Leukocytosis (Improving)  - Likely I/s/o rash vs reactive vs infectious process  - leukocytosis noted on admission to MICU (WBC 24.8) --> 12.7 (9/25)   - leukocytosis improved throughout hospital stay however started uptrending 9/20; now improving on 9/25   - Blood cultures 9/10, 9/12, 9/15 all NGTD  - Strep and Legionella Ag, MRSA nares negative  - CXR (9/21) largely unremarkable, again re-demonstrated perihilar prominence seen on prior XR  - Abdominal xray (9/21) unremarkable  - Repeat blood cultures x 2 (9/21) pending  - UA (9/21) with 500 LE, urine cx pending     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  # ACS  - Previously managed through routine RBC exchanges q6 weeks, most recent RBCEx 3/1/24, per patient pausing on transfusions for time being  - OARRS reviewed, no aberrant behavior noted  - LDH>12,000 (now downtrending), haptoglobin <30, fibrinogen 318, retic % 5.2 on presentation, bili 10.7  - leukocytosis 24.8 on admission to MICU  - CXR (9/10) persistent atelectasis/scarring in the left lower lung  - CT CAP (9/11) Diffuse mosaic attenuation of the lung parenchyma, which can be seen in the setting of small airway disease, pulmonary edema or acute infection  - intubated upon arrival to MICU, now s/p extubation 9/16  - started on vanc and zosyn (finished both courses prior to floor transfer)  - Procal elevated 7.09 (9/11)  - Blood cultures 9/10, 9/12, 9/15 all NGTD  - Strep and Legionella Ag, MRSA nares negative  - trop peak 1431, cards rec treat as ACS (see below)  - S/p 2u pRBCs 9/10 on arrival to MICU  - S/p exchange transfusion 9/11 AM for ACS  - Care plan from 12/6/23- For mild to moderate pain: Dilaudid 0.5mg IVP q3h as needed, for moderate to severe pain Dilaudid 1- 1.5mg q3h PRN  - On arrival to [facility], started on  0.6mg dilaudid IVP q3h PRN severe pain   - Started 0.6mg dilaudid IVP q4h PRN breakthrough and 15mg PO oxycodone Q4 hrs severe pain (9/25- )   - Bowel regimen for opioid induced constipation, zofran for opioid induced nausea, and benadrly for opioid induced pruritus  - c/w home Duloxetine 40mg daily, PO Zofran PRN, Folic Acid 1mg daily, and MVI daily     # ST depression with tropinemia, suspected 2/2 demand ischemia iso ACS  # Hx SVT  - Elevated troponins on admission, peak 1431 and now downtrending   - EKG with ST depressions, likely Type II MI due to vaso-occlusive crisis and anemia  - TTE 9/11: EF 60-65%, RV enlarged and reduced in function which appears stable when compared to last TTE (1/2024)  - Troponin leak is likely due to cardiac demand vs supply mismatch in the setting of worsening anemia and vaso-occlusive crisis  - Cardiology consulted, now signed off with indication to treat troponemia as ACS  - Lasix held in MICU 2/2 hypotension and CHARLES, can resume as able   - Follow-up with Cardiology outpt, FUV 2/13/25    # CHARLES on CKD II, improving  - Baseline ~ SCr <2, peak 5.4 this admission. 9/21 sCr 1.58 --> 1.13 (9/25)   - likely mixed pre-renal/intra-renal, on CKD II, now polyuric phase  - FeNa 1.2%, consistent with ATN  - Severe hyperkalemia with previous peaked T waves on EKG, resolved  - Nephrology followed, now signed off, no need for HD  - s/p multiple K cocktails in MICU  - encourage increased PO intake     # Acute liver injury, improving  # Direct hyperbilirubinuria, improving  - Suspected 2/2 hemodynamic instability versus sickle cell crisis  - On presentation to  MICU ALT 2611, AST 4727, T bili 10.2, INR 3.0; continues to improve  - CT with hepatic venous congestion, patient lacks gallbladder  - Liver US with Doppler: Diffuse fatty infiltration, unremarkable doppler interrogation of the liver  - EBV IgG positive, IgM negative  - CMV IgG positive, PCR negative  - Acute hepatitis panel unremarkable  -  Hepatology initially following, now signed off  -Will continue to monitor, trend daily CMP     # pHTN   - Initial c/f CTEPH as imaging findings with dilated PA, filling defects, and mosaicism  - VQ scan (6/13/23) with non anatomical basal defects and RUL defect due to scar--> not favoring CTEPH per Dr. Yi and Dr. 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 On lifelong anticoagulation, DOAC discouraged due to high risk of clotting   - home Coumadin 5mg daily initially HELD on admission to MICU 2/2 unstable course  - restarted coumadin 9/18 PM, will plan to bridge with bival, Ok'd by heme   - 9/20 INR 3.8, bival and home warfarin held. Pharmacy rec repeat INR 4 hours after bival was held. If the INR < 2, we can restart the bival, but if the INR 2-3, we can continue with warfarin monotherapy   - 9/20 repeat INR 3.7, pharmacy rec continuing to hold both medications  - 9/21 INR 3.2, per pharmacy - get repeat INR in AM 9/22, if INR between 2-3 can restart home warfarin 5mg daily  -INR 2.6 after restarting Warfarin 5mg, will maintain INR goal of 2-3, Home dose 5mg Warfarin daily and 7.5 mg on Tuesday  - INR 3.4 (9/25). Held warfarin on 9/25. Repeat INR 9/26. Plan to restart if <3   - Follows with Coumadin clinic outpatient  - Caution with fluids      # CAN  - cont home CPAP   - cont home Albuterol PRN     # H/O Cauda Equine  syndrome  - Follows Dr. Delacruz as outpatient  - no current issues      # DISPO  - Full code- confirmed on admission  - NOK: Tati Salgado (mother) (#[phone])  - PT/OT rec SNF, pending choice/placement  - FUV PCP 10/1, Anim 10/7, Cardiology 2/13

## 2024-09-25 NOTE — CONSULTS
Inpatient consult to Dermatology  Consult performed by: Aracely Rg DO  Consult ordered by: GRISEL De-CNP      DERMATOLOGY DEPARTMENT CONSULTATION NOTE  Name: Senait Narvaez  MRN: [id number]  : 1970    Reason for consultation: Worsening rash     History of Present Illness  Senait Narvaez is a 53 year-old female with a PMHx of Hgb SC disease (previously on exchange transfusions q4-6 weeks, last transfusion while inpatient ), hx of SVC syndrome, recurrent DVT/PE (on lifelong Coumadin), pHTN (2023), cauda equina with saddle numbness, hx SVT, fibromyalgia, Raynaud's disease, CKD II (baseline SCr~<2) and CAN who presented to [facility] ED for diffuse pain. Dermatology has been re-consulted for worsening rash.     Brief HPI: She was transferred to [facility] 9/10 with evidence of multi-organ failure likely secondary to vaso-occlusive sickle cell crisis vs sepsis. She was intubated while in MICU due to worsening lethargy in the setting of metabolic acidosis. She was transfused with 2 units of pRBCs, and underwent red blood cell exchange . Dermatology was initially consulted 9/15 due to new rash with bullae. Primary team had concern for heparin induced thrombocytopenia, therefore, patient was discontinued on heparin, and bivalrudin initiated. Biopsies from  were most consistent with a multifocal fixed drug eruption. After review of medications, vancomycin was the likely culprit. Upon visits to the patient, she had no additional progression of the rash, and the affected areas had been treated with triamcinolone 0.1% cream.     Nurses stated they noticed progression of the rash most likely starting . Patient states she has pain the open skin areas, with itch in the new red areas on the trunk and lower extremities.        Review of Systems  Review of Systems     Past Medical History  Past Medical History:   Diagnosis Date    Asthma ([facility]-HCC)     CHF (congestive heart failure) ([facility])     Chronic pain  disorder     Compression of vein 02/19/2020    Superior vena cava syndrome    Hypotension, unspecified 12/10/2020       Past Surgical History   has a past surgical history that includes Appendectomy (08/05/2013); Cholecystectomy (08/05/2013); Other surgical history (05/13/2014); Other surgical history (10/09/2014); Other surgical history (06/09/2014); MR angio head wo IV contrast (8/16/2014); MR angio neck wo IV contrast (8/16/2014); IR CVC tunneled (3/13/2015); IR CVC tunneled (1/29/2016); IR CVC tunneled (1/17/2018); IR CVC tunneled (2/22/2018); IR CVC tunneled (3/22/2018); IR CVC tunneled (4/18/2018); IR CVC tunneled (5/16/2018); IR CVC tunneled (6/12/2018); US guided biopsy lymph node superficial (9/17/2019); CT guided percutaneous biopsy LYMPH node superficial (9/19/2019); IR CVC tunneled (1/21/2020); IR CVC tunneled (2/28/2020); IR CVC tunneled (4/10/2020); IR CVC tunneled (5/22/2020); IR CVC tunneled (6/19/2020); IR CVC tunneled (7/23/2020); IR CVC tunneled (9/4/2020); IR CVC tunneled (10/9/2020); IR CVC tunneled (11/13/2020); IR CVC tunneled (12/18/2020); IR CVC tunneled (1/22/2021); IR CVC tunneled (2/26/2021); IR CVC tunneled (4/2/2021); IR CVC tunneled (5/7/2021); IR CVC tunneled (6/11/2021); IR CVC tunneled (7/16/2021); IR CVC tunneled (8/20/2021); IR CVC tunneled (9/24/2021); IR CVC tunneled (10/29/2021); IR CVC tunneled (12/3/2021); IR CVC tunneled (1/7/2022); IR CVC tunneled (2/23/2022); IR CVC tunneled (3/25/2022); IR CVC tunneled (4/22/2022); IR CVC tunneled (6/3/2022); IR CVC tunneled (9/18/2017); IR CVC tunneled (10/30/2017); IR CVC tunneled (12/19/2017); IR CVC tunneled (1/6/2023); IR CVC tunneled (2/24/2023); IR CVC tunneled (7/8/2022); IR CVC tunneled (8/12/2022); IR CVC tunneled (9/16/2022); CT angio neck (10/19/2022); IR CVC tunneled (10/20/2022); IR CVC tunneled (11/29/2022); IR CVC tunneled (3/31/2023); IR CVC tunneled (6/9/2023); IR CVC tunneled (7/20/2023); IR CVC tunneled (8/16/2023); IR  CVC tunneled (9/21/2023); IR CVC nontunneled (10/26/2023); IR CVC nontunneled (12/7/2023); and Biopsy (9/15/2024).     Allergies  Allergies   Allergen Reactions    Vancomycin Rash       Medications  Scheduled Meds: bisacodyl, 10 mg, rectal, Daily  folic acid, 1 mg, oral, Daily  metoprolol tartrate, 12.5 mg, oral, BID  nystatin, 1 Application, Topical, BID  pantoprazole, 40 mg, intravenous, Daily  polyethylene glycol, 17 g, oral, BID  sennosides, 2 tablet, oral, BID  thiamine, 100 mg, intravenous, Daily  triamcinolone, , Topical, BID  [Held by provider] warfarin, 5 mg, oral, Daily       Continuous Infusions:     PRN Meds: PRN medications: acetaminophen, albuterol, alteplase, dextrose, dextrose, glucagon, glucagon, HYDROmorphone, ondansetron, oxyCODONE, oxygen, sodium chloride     Family History  Family History   Problem Relation Name Age of Onset    Diabetes Father      Gout Father      Sickle cell trait Father      Seizures Sister      Diabetes Other      Uterine cancer Other      Other (congenital blindness) Cousin         Social History   reports that she quit smoking about 10 years ago. Her smoking use included cigarettes. She has been exposed to tobacco smoke. She has never used smokeless tobacco. She reports that she does not currently use alcohol. She reports that she does not currently use drugs.     Objective    Vitals:    09/25/24 0828 09/25/24 1150 09/25/24 1458 09/25/24 1605   BP: 108/71 109/72  90/62   BP Location: Left arm Left arm     Patient Position: Sitting Lying     Pulse: 98 80 84 87   Resp: 16 18  18   Temp: 36.4 °C (97.5 °F) 36.5 °C (97.7 °F)  36.6 °C (97.9 °F)   TempSrc: Temporal Temporal  Temporal   SpO2: 96% 95% 98% 97%   Weight:       Height:            Exam    GEN: no acute distress  NEURO: moving all extremities   EYES: conjunctiva and eyelids normal. No conjunctival injection or erosions appreciated  ENT:   - Lips: normal  - Teeth/gums: normal  - Oropharynx: normal tongue and  mucosa  NECK: normal and symmetric.   CV: no varicosities, warmth or tenderness of extremities.  GI: Flat abdomen. Non-tender. No hepatosplenomegaly.    EXTREMITIES: no distal digital clubbing, cyanosis, petechiae   SKIN: A full body skin exam including scalp, face, eyes, ears, neck, trunk, bilateral upper & lower extremities, toenails and fingernails were examined with the following findings:  On the bilateral breasts, flanks, umbilical area, there are black plaques with some desquamating scale, areas of erosions on the inferior aspect of the breasts as well as inner thighs ; ill defined erythema on the abdomen, breasts, and thighs       Laboratory and Data  Results for orders placed or performed during the hospital encounter of 09/10/24 (from the past 24 hour(s))   Lactate dehydrogenase   Result Value Ref Range     (H) 84 - 246 U/L   Reticulocytes   Result Value Ref Range    Retic % 8.6 (H) 0.5 - 2.0 %    Retic Absolute 0.256 (H) 0.018 - 0.083 x10*6/uL    Reticulocyte Hemoglobin 29 28 - 38 pg    Immature Retic fraction 12.3 <=16.0 %   Comprehensive metabolic panel   Result Value Ref Range    Glucose 71 (L) 74 - 99 mg/dL    Sodium 141 136 - 145 mmol/L    Potassium 4.7 3.5 - 5.3 mmol/L    Chloride 103 98 - 107 mmol/L    Bicarbonate 31 21 - 32 mmol/L    Anion Gap 12 10 - 20 mmol/L    Urea Nitrogen 16 6 - 23 mg/dL    Creatinine 1.13 (H) 0.50 - 1.05 mg/dL    eGFR 58 (L) >60 mL/min/1.73m*2    Calcium 8.8 8.6 - 10.6 mg/dL    Albumin 2.7 (L) 3.4 - 5.0 g/dL    Alkaline Phosphatase 168 (H) 33 - 110 U/L    Total Protein 6.2 (L) 6.4 - 8.2 g/dL    AST 43 (H) 9 - 39 U/L    Bilirubin, Total 2.5 (H) 0.0 - 1.2 mg/dL    ALT 57 (H) 7 - 45 U/L   Protime-INR   Result Value Ref Range    Protime 38.9 (H) 9.8 - 12.8 seconds    INR 3.4 (H) 0.9 - 1.1   CBC and Auto Differential   Result Value Ref Range    WBC 12.7 (H) 4.4 - 11.3 x10*3/uL    nRBC 8.0 (H) 0.0 - 0.0 /100 WBCs    RBC 2.97 (L) 4.00 - 5.20 x10*6/uL    Hemoglobin 9.1 (L)  12.0 - 16.0 g/dL    Hematocrit 28.2 (L) 36.0 - 46.0 %    MCV 95 80 - 100 fL    MCH 30.6 26.0 - 34.0 pg    MCHC 32.3 32.0 - 36.0 g/dL    RDW 19.9 (H) 11.5 - 14.5 %    Platelets 459 (H) 150 - 450 x10*3/uL    Immature Granulocytes %, Automated 2.0 (H) 0.0 - 0.9 %    Immature Granulocytes Absolute, Automated 0.25 0.00 - 0.70 x10*3/uL   Manual Differential   Result Value Ref Range    Neutrophils %, Manual 85.3 40.0 - 80.0 %    Bands %, Manual 1.7 0.0 - 5.0 %    Lymphocytes %, Manual 6.9 13.0 - 44.0 %    Monocytes %, Manual 5.2 2.0 - 10.0 %    Eosinophils %, Manual 0.0 0.0 - 6.0 %    Basophils %, Manual 0.0 0.0 - 2.0 %    Atypical Lymphocytes %, Manual 0.9 0.0 - 2.0 %    Seg Neutrophils Absolute, Manual 10.83 (H) 1.20 - 7.00 x10*3/uL    Bands Absolute, Manual 0.22 0.00 - 0.70 x10*3/uL    Lymphocytes Absolute, Manual 0.88 (L) 1.20 - 4.80 x10*3/uL    Monocytes Absolute, Manual 0.66 0.10 - 1.00 x10*3/uL    Eosinophils Absolute, Manual 0.00 0.00 - 0.70 x10*3/uL    Basophils Absolute, Manual 0.00 0.00 - 0.10 x10*3/uL    Atypical Lymphs Absolute, Manual 0.11 0.00 - 0.50 x10*3/uL    Total Cells Counted 116     Neutrophils Absolute, Manual 11.05 (H) 1.20 - 7.70 x10*3/uL    RBC Morphology See Below     Polychromasia Mild     Target Cells Many      *Note: Due to a large number of results and/or encounters for the requested time period, some results have not been displayed. A complete set of results can be found in Results Review.        Assessment/Plan   Senait Narvaez is a 53 year-old female with a PMHx of Hgb SC disease (previously on exchange transfusions q4-6 weeks, last transfusion while inpatient 9/11), hx of SVC syndrome, recurrent DVT/PE (on lifelong Coumadin), pHTN (6/2023), cauda equina with saddle numbness, hx SVT, fibromyalgia, Raynaud's disease, CKD II (baseline SCr~<2) and CAN who presented to [facility] ED for diffuse pain. Dermatology has been re-consulted for worsening rash.     Differential diagnoses: Favor Multifocal Fixed  Drug Eruption     Impression: The erythema appreciated on the trunk and extremities is surrounding the known drug eruption. Given the location and similar symptoms as original rash, it is most likely part of the drug eruption the patient has previously been known to have. The use of topical steroid as well as removing the offending agent has attenuated the progression of the disease. The erosions appreciated on the breasts and inner thighs are secondary to the necrotic skin that was seen on the biopsy, still consistent with the same drug eruption in its healing stages. Continued management of these areas will be helpful in proper resolution of the rash and re-epithelialization of eroded areas.      Recommendations:  -Apply triamcinolone 0.1% cream to both the black plaques as well as the red areas across the trunk and thighs   -Apply vaseline to all eroded/denuded areas. Mepilex transfer sheets may be used for areas of friction     The patient was seen and discussed with attending physician Dr. Sands. The assessment and plan was communicated to the care team.    Thank you for the consultation and for the opportunity to contribute to the care of this patient.      Aracely Rg,    PGY3, Dermatology  Epic chat (preferred)  Team pager [phone]     I saw and evaluated the patient. I personally obtained the key and critical portions of the history and physical exam or was physically present for key and critical portions performed by the resident. I reviewed the resident's documentation and discussed the patient with the resident. I agree with the resident's medical decision making as documented in the note.    Shad Sands MD

## 2024-09-25 NOTE — CARE PLAN
The patient's goals for the shift include      The clinical goals for the shift include pt will stand by end of shift    Problem: Safety - Medical Restraint  Goal: Remains free of injury from restraints (Restraint for Interference with Medical Device)  Outcome: Progressing  Goal: Free from restraint(s) (Restraint for Interference with Medical Device)  Outcome: Progressing     Problem: Pain - Adult  Goal: Verbalizes/displays adequate comfort level or baseline comfort level  Outcome: Progressing     Problem: Safety - Adult  Goal: Free from fall injury  Outcome: Progressing     Problem: Skin  Goal: Decreased wound size/increased tissue granulation at next dressing change  Outcome: Progressing  Goal: Participates in plan/prevention/treatment measures  Outcome: Progressing  Goal: Prevent/manage excess moisture  Outcome: Progressing  Goal: Prevent/minimize sheer/friction injuries  Outcome: Progressing  Goal: Promote/optimize nutrition  Outcome: Progressing  Goal: Promote skin healing  Outcome: Progressing     Problem: Knowledge Deficit  Goal: Patient/family/caregiver demonstrates understanding of disease process, treatment plan, medications, and discharge instructions  Outcome: Progressing     Problem: Mechanical Ventilation  Goal: Patient Will Maintain Patent Airway  Outcome: Progressing  Goal: Oral health is maintained or improved  Outcome: Progressing  Goal: Tracheostomy will be managed safely  Outcome: Progressing  Goal: ET tube will be managed safely  Outcome: Progressing  Goal: Ability to express needs and understand communication  Outcome: Progressing  Goal: Mobility/activity is maintained at optimum level for patient  Outcome: Progressing     Problem: Pain  Goal: Takes deep breaths with improved pain control throughout the shift  Outcome: Progressing  Goal: Turns in bed with improved pain control throughout the shift  Outcome: Progressing  Goal: Walks with improved pain control throughout the shift  Outcome:  Progressing  Goal: Performs ADL's with improved pain control throughout shift  Outcome: Progressing  Goal: Participates in PT with improved pain control throughout the shift  Outcome: Progressing  Goal: Free from opioid side effects throughout the shift  Outcome: Progressing  Goal: Free from acute confusion related to pain meds throughout the shift  Outcome: Progressing     Problem: Nutrition  Goal: Less than 5 days NPO/clear liquids  Outcome: Progressing  Goal: Oral intake greater than 50%  Outcome: Progressing  Goal: Oral intake greater 75%  Outcome: Progressing  Goal: Consume prescribed supplement  Outcome: Progressing  Goal: Adequate PO fluid intake  Outcome: Progressing  Goal: Nutrition support goals are met within 48 hrs  Outcome: Progressing  Goal: Nutrition support is meeting 75% of nutrient needs  Outcome: Progressing  Goal: Tube feed tolerance  Outcome: Progressing  Goal: BG  mg/dL  Outcome: Progressing  Goal: Lab values WNL  Outcome: Progressing  Goal: Electrolytes WNL  Outcome: Progressing  Goal: Promote healing  Outcome: Progressing  Goal: Maintain stable weight  Outcome: Progressing  Goal: Reduce weight from edema/fluid  Outcome: Progressing  Goal: Gradual weight gain  Outcome: Progressing  Goal: Improve ostomy output  Outcome: Progressing     Problem: Fall/Injury  Goal: Not fall by end of shift  Outcome: Progressing  Goal: Be free from injury by end of the shift  Outcome: Progressing  Goal: Verbalize understanding of personal risk factors for fall in the hospital  Outcome: Progressing  Goal: Verbalize understanding of risk factor reduction measures to prevent injury from fall in the home  Outcome: Progressing  Goal: Use assistive devices by end of the shift  Outcome: Progressing  Goal: Pace activities to prevent fatigue by end of the shift  Outcome: Progressing

## 2024-09-25 NOTE — PROGRESS NOTES
Senait Narvaez is a 54 y.o. female on day 15 of admission presenting with Sickle cell disease with crisis and other complication.    Subjective   Seen at bedside this AM. Patient is up in chair this morning. States that pain feels worse this morning and did not get much sleep overnight because of pain. Discussed increasing her home dose of PO oxycodone and keeping her IV dilaudid the same today. Her pain is generalized and skin feels like it is burning. She is very nervous about skin breakdown and does not feel like her skin is getting any better. Discussed talking to wound care and re-engaging dermatology again today for further guidance.     Objective     Physical Exam  Vitals reviewed.   Constitutional:       Appearance: Normal appearance.   HENT:      Head: Normocephalic and atraumatic.      Nose: Nose normal.      Mouth/Throat:      Mouth: Mucous membranes are moist.      Pharynx: Oropharynx is clear.   Eyes:      Extraocular Movements: Extraocular movements intact.      Pupils: Pupils are equal, round, and reactive to light.   Cardiovascular:      Rate and Rhythm: Normal rate and regular rhythm.      Pulses: Normal pulses.      Heart sounds: Normal heart sounds.   Pulmonary:      Effort: Pulmonary effort is normal.      Breath sounds: Normal breath sounds.   Abdominal:      General: Bowel sounds are normal.      Palpations: Abdomen is soft.   Musculoskeletal:         General: Normal range of motion.   Skin:     General: Skin is warm.      Findings: Rash present. Rash is crusting.      Comments: A full body skin exam complete. Scattered, purple plaques across forehead, chest, and bilateral upper thighs and bilateral breasts .  Moisture associated vs drug eruption rash in groin    Neurological:      General: No focal deficit present.      Mental Status: She is alert and oriented to person, place, and time. Mental status is at baseline.   Psychiatric:         Mood and Affect: Mood normal.         Behavior: Behavior  "normal.         Last Recorded Vitals  Blood pressure 108/71, pulse 98, temperature 36.4 °C (97.5 °F), temperature source Temporal, resp. rate 16, height 1.651 m (5' 5\"), weight 120 kg (264 lb 12.4 oz), SpO2 96%.  Intake/Output last 3 Shifts:  I/O last 3 completed shifts:  In: - (0 mL/kg)   Out: 1150 (9.6 mL/kg) [Urine:1150 (0.3 mL/kg/hr)]  Weight: 120.1 kg     Relevant Results  Scheduled medications  bisacodyl, 10 mg, rectal, Daily  folic acid, 1 mg, oral, Daily  metoprolol tartrate, 12.5 mg, oral, BID  nystatin, 1 Application, Topical, BID  pantoprazole, 40 mg, intravenous, Daily  polyethylene glycol, 17 g, oral, BID  sennosides, 2 tablet, oral, BID  thiamine, 100 mg, intravenous, Daily  triamcinolone, , Topical, BID  [Held by provider] warfarin, 5 mg, oral, Daily    Continuous medications     PRN medications  PRN medications: acetaminophen, albuterol, alteplase, dextrose, dextrose, glucagon, glucagon, HYDROmorphone, ondansetron, oxyCODONE, oxygen, sodium chloride         Assessment/Plan   Assessment & Plan  Sickle cell disease with crisis and other complication  Senait Narvaez is a 53 year-old female with a PMHx of Hgb SC disease (previously on exchange transfusions q4-6 weeks, last transfusion 3/1/24), hx of SVC syndrome, 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 Patient was subsequently intubated due to worsening lethargic and lack of respiratory compensation from significant metabolic acidosis with concern for ACS. Vancomycin and Zosyn started for empiric coverage, patient also started on pressors for BP support. Patient was  transfused 2 units pRBCs and then underwent RBCEx on 9/11. Cardiology consulted for c/f NSTEMI vs demand ischemia (ST depressions and elevated troponin), rec " treat as ACS. Course further c/b severe CHARLES with peak sCr of 5.4, and acute liver injury with severely elevated liver enzymes (ALT 4119, AST >10k). Liver US only remarkable for fatty infiltration, viral hepatitis panel negative. CHARLES and liver injury improved with supportive management. Pruritic rash noted 9/14, Derm consulted and took multiple biopsies 9/15. Rash then began to worsen to involve the groin, lateral thigh, and scalp with numerous tense bullae. Discontinued heparin given concerns for potential HIT, started bivalirudin. Patient was extubated on 9/16/2024. Started stress dose steroids given continued pressor requirements, pressors Dc'd 9/17. Patient transferred to [facility] 9/17, PT/OT rec SNF. Coumadin bridge was started 9/18. Given persistent rash with unremarkable labs, derm re-biopsied on 9/18, findings ultimately felt to be most c/w fixed drug eruption. Dermatology contacted for bx results (9/20) rec triamcinolone cream BID and interdry cloths. Leukocytosis uptrending 9/21, repeat infectious workup negative (CXR, Abd XR, blood cultures, MRSA negative 9/21). UA with leuks, urine culture pending (9/21). For warfarin dosing, per pharmacy, if INR between 2-3 can restart home warfarin 5mg daily, if INR < 2 restart bival. INR 3.4 on 9/25, warfarin stopped, repeat INR pending 9/26. On 9/25, per nursing and patient, rash seems to be worsening. Derm re-engaged and wound care re-consulted, recs pending. DC pending improved pain and infectious workup.     #Purpuric rash, suspected fixed drug eruption  #Concern for possible heparin-induced thrombocytopenia (HIT) -resolved  -Purple, reticular pattern of plaques noted around the bilateral breasts, scalp and groin,  with tense bullae. Non-erythematous, no purulence  - Some initial concern for possible heparin-induced thrombocytopenia with concurrent skin findings. Discontinued heparin 9/16 and transitioned to bivalirudin  -4 T score 0, PF4 w/ reflex BINA negative.  Hematology ultimately consulted, felt there was low concern for HIT and signed off  -Differential diagnosis includes purpura secondary to infection versus coagulopathy versus vasculopathy versus small and/or medium vessel vasculitis versus calciphylaxis vs drug rash  -Dermatology consulted, work-up to date:  - Low Protein C activity  - ANCA, PR3 and MPO negative  - ISABELLE neg  - cryoglobulin labs- not enough specimen to analyze  - S/p skin punch biopsies x 2 9/15 and one on 9/18, showed SUPERFICIAL EPIDERMAL DEGENERATION WITH ERYTHROCYTE EXTRAVASATION WITH NEUTROPHILS. These findings could be seen secondary to an older trauma, or resolving erythema multiforme, a fixed drug eruption, Edgar-Christopher syndrome, or toxic epidermal necrolysis. Favor Multifocal Fixed Drug Eruption    - Dermatology contacted (9/20) about results, recommended adding vancomycin to allergy list and starting triamcinolone  - Continue triamcinolone 0.1% cream BID (9/20-current) per derm recs  - Plan for suture removal around 9/25-9/27  - On 9/25, concern from pt and nursing that rash is not improving. Under breasts and groin still causing pain. Bilateral upper thighs and forehead rash improving   - Wound care re-consulted 9/25   - Re-engaged dermatology on 9/25 given worsening rash; recs pending      #Leukocytosis (Improving)  - Likely I/s/o rash vs reactive vs infectious process  - leukocytosis noted on admission to MICU (WBC 24.8) --> 12.7 (9/25)   - leukocytosis improved throughout hospital stay however started uptrending 9/20; now improving on 9/25   - Blood cultures 9/10, 9/12, 9/15 all NGTD  - Strep and Legionella Ag, MRSA nares negative  - CXR (9/21) largely unremarkable, again re-demonstrated perihilar prominence seen on prior XR  - Abdominal xray (9/21) unremarkable  - Repeat blood cultures x 2 (9/21) pending  - UA (9/21) with 500 LE, urine cx pending     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  # ACS  - Previously managed through routine RBC  exchanges q6 weeks, most recent RBCEx 3/1/24, per patient pausing on transfusions for time being  - OARRS reviewed, no aberrant behavior noted  - LDH>12,000 (now downtrending), haptoglobin <30, fibrinogen 318, retic % 5.2 on presentation, bili 10.7  - leukocytosis 24.8 on admission to MICU  - CXR (9/10) persistent atelectasis/scarring in the left lower lung  - CT CAP (9/11) Diffuse mosaic attenuation of the lung parenchyma, which can be seen in the setting of small airway disease, pulmonary edema or acute infection  - intubated upon arrival to MICU, now s/p extubation 9/16  - started on vanc and zosyn (finished both courses prior to floor transfer)  - Procal elevated 7.09 (9/11)  - Blood cultures 9/10, 9/12, 9/15 all NGTD  - Strep and Legionella Ag, MRSA nares negative  - trop peak 1431, cards rec treat as ACS (see below)  - [facility]  - S/p exchange transfusion 9/11 AM for ACS  - Care plan from 12/6/23- For mild to moderate pain: Dilaudid 0.5mg IVP q3h as needed, for moderate to severe pain Dilaudid 1- 1.5mg q3h PRN  - On arrival to [facility], started on 0.6mg dilaudid IVP q3h PRN severe pain   - Started 0.6mg dilaudid IVP q4h PRN breakthrough and 15mg PO oxycodone Q4 hrs severe pain (9/25- )   - Bowel regimen for opioid induced constipation, zofran for opioid induced nausea, and benadrly for opioid induced pruritus  - c/w home Duloxetine 40mg daily, PO Zofran PRN, Folic Acid 1mg daily, and MVI daily     # ST depression with tropinemia, suspected 2/2 demand ischemia iso ACS  # Hx SVT  - Elevated troponins on admission, peak 1431 and now downtrending   - EKG with ST depressions, likely Type II MI due to vaso-occlusive crisis and anemia  - TTE 9/11: EF 60-65%, RV enlarged and reduced in function which appears stable when compared to last TTE (1/2024)  - Troponin leak is likely due to cardiac demand vs supply mismatch in the setting of worsening anemia and vaso-occlusive crisis  - Cardiology  consulted, now signed off with indication to treat troponemia as ACS  - Lasix held in MICU 2/2 hypotension and CHARLES, can resume as able   - Follow-up with Cardiology outpt, FUV 2/13/25    # CHARLES on CKD II, improving  - Baseline ~ SCr <2, peak 5.4 this admission. 9/21 sCr 1.58 --> 1.13 (9/25)   - likely mixed pre-renal/intra-renal, on CKD II, now polyuric phase  - FeNa 1.2%, consistent with ATN  - Severe hyperkalemia with previous peaked T waves on EKG, resolved  - Nephrology followed, now signed off, no need for HD  - s/p multiple K cocktails in MICU  - encourage increased PO intake     # Acute liver injury, improving  # Direct hyperbilirubinuria, improving  - Suspected 2/2 hemodynamic instability versus sickle cell crisis  - On presentation to  MICU ALT 2611, AST 4727, T bili 10.2, INR 3.0; continues to improve  - CT with hepatic venous congestion, patient lacks gallbladder  - Liver US with Doppler: Diffuse fatty infiltration, unremarkable doppler interrogation of the liver  - EBV IgG positive, IgM negative  - CMV IgG positive, PCR negative  - Acute hepatitis panel unremarkable  - Hepatology initially following, now signed off  -Will continue to monitor, trend daily CMP     # pHTN   - Initial c/f CTEPH as imaging findings with dilated PA, filling defects, and mosaicism  - VQ scan (6/13/23) with non anatomical basal defects and RUL defect due to scar--> not favoring CTEPH per Dr. Yi and Dr. 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 On lifelong anticoagulation, DOAC discouraged due to high risk of clotting   - home Coumadin 5mg daily initially HELD on admission to MICU 2/2 unstable course  - restarted coumadin 9/18 PM, will plan to bridge with bival, Ok'd by heme   - 9/20 INR 3.8, bival and home warfarin held. Pharmacy rec repeat INR 4 hours after bival was held. If the INR < 2, we can restart the bival, but if the INR 2-3, we can continue with warfarin monotherapy   - 9/20 repeat INR 3.7, pharmacy rec continuing to hold both medications  - 9/21 INR 3.2, per pharmacy - get repeat INR in AM 9/22, if INR between 2-3 can restart home warfarin 5mg daily  -INR 2.6 after restarting Warfarin 5mg, will maintain INR goal of 2-3, Home dose 5mg Warfarin daily and 7.5 mg on Tuesday  - INR 3.4 (9/25). Held warfarin on 9/25. Repeat INR 9/26. Plan to restart if <3   - Follows with Coumadin clinic outpatient  - Caution with fluids      # CAN  - cont home CPAP   - cont home Albuterol PRN     # H/O Cauda Equine syndrome  - Follows Dr. Delacruz as outpatient  - no current issues      # DISPO  - Full code- confirmed on admission  - NOK: Tati Salgado (mother) (#[phone])  - PT/OT rec SNF, pending choice/placement  - FUV PCP 10/1, Anim 10/7, Cardiology 2/13    I spent >60 minutes in the professional and overall care of this patient.    Assessment and plan discussed with attending physician Dr. Tayo Kerns, APRN-CNP

## 2024-09-25 NOTE — SIGNIFICANT EVENT
Rapid Response RN Note     09/25/24 0235   Onset Documentation   Rapid Response Initiated By Radar auto page   Location/Room [facility]  ([facility] 0119)   Pager Time 0234   Arrival Time 0235   Event End Time 0238   Primary Reason for Call Radar auto page  (score 6)     RADAR score 6 due to the following VS: T 37.2 °C; ; RR 18; BP 92/65; SPO2 94%.     Reviewed above VS with bedside RN via phone.  Vital signs within patient's current trends.  Patient without complaints.  Staff to page rapid response for any concerns or acute change in condition/VS.

## 2024-09-25 NOTE — CARE PLAN
The patient's goals for the shift include      The clinical goals for the shift include patient will get out of bed and remain in chair for 50% of the shhift 9/25/24 1900      Problem: Pain - Adult  Goal: Verbalizes/displays adequate comfort level or baseline comfort level  Outcome: Progressing     Problem: Skin  Goal: Decreased wound size/increased tissue granulation at next dressing change  Outcome: Progressing  Goal: Participates in plan/prevention/treatment measures  Outcome: Progressing  Goal: Prevent/manage excess moisture  Outcome: Progressing  Flowsheets (Taken 9/25/2024 1021)  Prevent/manage excess moisture: Cleanse incontinence/protect with barrier cream  Goal: Prevent/minimize sheer/friction injuries  Outcome: Progressing  Goal: Promote/optimize nutrition  Outcome: Progressing  Goal: Promote skin healing  Outcome: Progressing

## 2024-09-25 NOTE — PROGRESS NOTES
Art Therapy Note    Senait Narvaez     Therapy Session  Referral Type: New referral this admission  Visit Type: Follow-up visit  Session Start Time: 1457  Session End Time: 1504  Intervention Delivery: In-person  Conflict of Service: Declined treatment  Number of family members present: 0  Number of staff members present: 1              Treatment/Interventions  Areas of Focus: Pain management  Art Therapy Interventions: Assessment, Empathic listening/validating emotions  Interruption: No  Patient Fell Asleep at End of Session: No    Post-assessment  Total Session Time (min): 7 minutes    Narrative  Assessment Detail: ATR made follow up visit to Pt.;s room to assess for AT needs and wants.  Pt lying in bed resting and finishing up with another provide but welcomed the visit.  Evaluation: Pt open to talking with ATR about the benfits of AT services while in the hospital and how it can be used as intervention to distract her from  her pain.  Pt open to the idea but delcoiend forthe day stating she was in way too much pain to sit and attempt any art.  She appreciated the offer and visit and asked to be seen again at the end of the week.  Follow-up: ATR will continue to follow Pt and try to offer services again at the end of the wweek per her request.    Education Documentation  No documentation found.

## 2024-09-25 NOTE — PROGRESS NOTES
Physical Therapy                 Therapy Communication Note    Patient Name: Senait Narvaez  MRN: [id number]  [facility]  Room: [facility]4024-  Today's Date: 9/25/2024     Discipline: Physical Therapy    Missed Visit Reason: Missed Visit Reason: Other (Comment)    Missed Time: Attempt    Comment: Conflict of service: pt care, pt being washed up by staff upon arrival.

## 2024-09-25 NOTE — CONSULTS
Wound Care Consult     Visit Date: 9/25/2024      Patient Name: Senait Narvaez         MRN: [id number]           YOB: 1970     Reason for Consult: Reassess skin changes              Pertinent Labs:   Albumin   Date Value Ref Range Status   09/25/2024 2.7 (L) 3.4 - 5.0 g/dL Final   02/16/2022 3.9 3.4 - 5.0 g/dL Final     ALBUMIN (MG/L) IN URINE   Date Value Ref Range Status   04/20/2022 10.7 Not Established mg/L Final     Albumin, CSF   Date Value Ref Range Status   09/10/2019 15 0 - 35 mg/dL Final     Albumin Index   Date Value Ref Range Status   09/10/2019 5.1 0.0 - 9.0 ratio Final     Albumin, Serum   Date Value Ref Range Status   09/10/2019 2,970 (L) 3,500 - 5,200 mg/dL Final     ALBUMIN-CSF - DATA CONVERSION   Date Value Ref Range Status   09/10/2019 13.4 8.4 - 34.2 mg/dL Final       Wound Assessment:  Wound 09/16/24 Rectum (Active)   Wound Image   09/24/24 1052   Wound Length (cm) 2 cm 09/16/24 1312   Wound Width (cm) 0.5 cm 09/16/24 1312   Wound Surface Area (cm^2) 1 cm^2 09/16/24 1312   Drainage Description None 09/24/24 1151   Drainage Amount None 09/24/24 1151   Dressing Open to air 09/24/24 1151       Wound 09/18/24 Breast Left;Lower (Active)   Wound Image    09/24/24 1042   Drainage Description None 09/24/24 1151   Drainage Amount Scant 09/24/24 1151   Dressing Petroleum gauze;Other (Comment) 09/25/24 1243   Dressing Changed New 09/25/24 1243   Dressing Status Clean;Dry 09/25/24 1243       Wound 09/18/24 Breast Lateral;Lower;Right (Active)   Wound Image    09/24/24 1043   Drainage Description None 09/24/24 1151   Drainage Amount Small 09/24/24 1151   Dressing Petroleum gauze;Other (Comment) 09/25/24 1243   Dressing Changed New 09/25/24 1243   Dressing Status Clean;Dry 09/25/24 1243       Wound 09/24/24 Left (Active)   Wound Image   09/24/24 1051   Drainage Description None 09/24/24 1151   Drainage Amount None 09/24/24 1151   Dressing Open to air 09/24/24 1151       Wound 09/24/24 Right  (Active)   Wound Image   09/24/24 1059   Drainage Description None 09/24/24 1151   Drainage Amount None 09/24/24 1151   Dressing Open to air 09/24/24 1151       Wound 09/24/24 Pretibial Distal;Right (Active)   Wound Image   09/24/24 1100   Drainage Description None 09/24/24 1151   Drainage Amount None 09/24/24 1151   Dressing Open to air 09/24/24 1151       Wound 09/24/24 Leg Left;Proximal;Upper;Anterior (Active)   Wound Image   09/24/24 1101   Drainage Description None 09/24/24 1151   Drainage Amount None 09/24/24 1151   Dressing Open to air 09/24/24 1151       Wound Team Summary Assessment:   Spoke with bedside nurse. Dermatology assessed patient today. New recommendations for care.  Will defer to dermatology       Wound Team Plan: Please contact wound care for support      Emilee FOREMAN   9/25/2024  2:21 PM

## 2024-09-25 NOTE — PROGRESS NOTES
Physical Therapy    Physical Therapy Treatment    Patient Name: Senait Narvaez  MRN: [id number]  Department: [facility]  Room: [facility]4024-A  Today's Date: 9/25/2024  Time Calculation  Start Time: 1458  Stop Time: 1521  Time Calculation (min): 23 min         Assessment/Plan   PT Assessment  PT Assessment Results: Decreased strength, Decreased range of motion, Decreased endurance, Impaired balance, Decreased mobility, Pain  Rehab Prognosis: Good  Barriers to Discharge: none  Evaluation/Treatment Tolerance: Patient limited by fatigue, Patient limited by pain  Medical Staff Made Aware: Yes  Strengths: Attitude of self, Coping skills  Barriers to Participation: Comorbidities  End of Session Communication: Bedside nurse  Assessment Comment: The pt performed supine therapeutic exercises with minimal difficulty due L LE pain and decreased strength.  End of Session Patient Position: Bed, 3 rail up, Alarm on  PT Plan  Inpatient/Swing Bed or Outpatient: Inpatient  PT Plan  Treatment/Interventions: Bed mobility, Transfer training, Gait training, Balance training, Strengthening, Endurance training, Range of motion, Therapeutic exercise, Therapeutic activity, Home exercise program  PT Plan: Ongoing PT  PT Frequency: 3 times per week  PT Discharge Recommendations: Moderate intensity level of continued care  Equipment Recommended upon Discharge: Wheeled walker  PT Recommended Transfer Status: Assist x1  PT - OK to Discharge: Yes      General Visit Information:   PT  Visit  PT Received On: 09/25/24  Response to Previous Treatment: Patient reporting fatigue but able to participate.  General  Missed Visit: Yes  Missed Visit Reason: Other (Comment)  Prior to Session Communication: Bedside nurse  Patient Position Received: Bed, 3 rail up, Alarm on  Preferred Learning Style: verbal, visual, written  General Comment: The pt politely declined EOB and OOB activities, but agreeable to supine therapeutic exercises.    Subjective    Precautions:  Precautions  Hearing/Visual Limitations: Hearing and vision WFL  Medical Precautions: Fall precautions, Oxygen therapy device and L/min  Precautions Comment: Pt in compliance with precautions throughout PT session.    Vital Signs (Past 2hrs)        Date/Time Vitals Session Patient Position Pulse Resp SpO2 BP MAP (mmHg)    09/25/24 1458 Pre PT  Lying  84  --  98 %  --  --                   Vital Signs Comment: vitals stable     Objective   Pain:  Pain Assessment  Pain Assessment: 0-10  0-10 (Numeric) Pain Score: 6  Pain Type: Acute pain  Pain Location: Leg  Pain Orientation: Left  Pain Descriptors: Aching  Pain Frequency: Constant/continuous  Pain Onset: Ongoing  Clinical Progression: Not changed  Pain Interventions: Therapeutic presence  Response to Interventions: Pain stable  Cognition:  Cognition  Overall Cognitive Status: Within Functional Limits  Orientation Level: Oriented X4  Coordination:  Movements are Fluid and Coordinated: Yes  Coordination Comment: WFL  Postural Control:  Postural Control  Postural Control:  (not assessed)  Static Sitting Balance  Static Sitting-Balance Support:  (not assessed)  Dynamic Sitting Balance  Dynamic Sitting-Balance Support:  (not assessed)  Static Standing Balance  Static Standing-Balance Support:  (not assessed)  Dynamic Standing Balance  Dynamic Standing-Balance Support:  (not assessed)  Extremity/Trunk Assessments:  RUE   RUE : Within Functional Limits  LUE   LUE: Within Functional Limits  RLE   RLE : Exceptions to WFL  AROM RLE (degrees)  RLE AROM Comment: WFL  Strength RLE  R Hip Flexion: 3/5  R Knee Flexion: 4-/5  R Knee Extension: 4-/5  R Ankle Dorsiflexion: 4+/5  R Ankle Plantar Flexion: 4+/5  LLE   LLE : Exceptions to WFL  AROM LLE (degrees)  LLE AROM Comment: Decreased hip and knee flexion  Strength LLE  L Hip Flexion: 2+/5  L Knee Flexion: 3-/5  L Knee Extension: 3-/5  L Ankle Dorsiflexion: 3+/5  L Ankle Plantar Flexion: 3+/5  Activity  Tolerance:  Activity Tolerance  Endurance: Endurance does not limit participation in activity (supine therapeutic exercises)  Treatments:  Therapeutic Exercise  Therapeutic Exercise Performed: Yes  Therapeutic Exercise Activity 1: ankle pumps x 10  Therapeutic Exercise Activity 2: heel slides x 10  Therapeutic Exercise Activity 3: SAQ x 10  Therapeutic Exercise Activity 4: SLR x 10  Therapeutic Exercise Activity 5: Hip ABD x 10    Therapeutic Activity  Therapeutic Activity Performed: No    Balance/Neuromuscular Re-Education  Balance/Neuromuscular Re-Education Activity Performed: No    Bed Mobility  Bed Mobility: No    Ambulation/Gait Training  Ambulation/Gait Training Performed: No  Transfers  Transfer: No    Stairs  Stairs: No    Outcome Measures:  [facility] Basic Mobility  Turning from your back to your side while in a flat bed without using bedrails: A little  Moving from lying on your back to sitting on the side of a flat bed without using bedrails: A lot  Moving to and from bed to chair (including a wheelchair): A lot  Standing up from a chair using your arms (e.g. wheelchair or bedside chair): A lot  To walk in hospital room: Total  Climbing 3-5 steps with railing: Total  Basic Mobility - Total Score: 11    OP EDUCATION:  Outpatient Education  Individual(s) Educated: Patient  Education Provided: Home Exercise Program  Patient Response to Education: Patient/Caregiver Verbalized Understanding of Information, Patient/Caregiver Performed Return Demonstration of Exercises/Activities    Encounter Problems       Encounter Problems (Active)       PT Problem       Patient will complete supine to sit and sit to supine Independent  (Progressing)       Start:  09/17/24    Expected End:  10/01/24            Patient will perform sit<>stand transfer with LRAD, and Modified Independent  (Progressing)       Start:  09/17/24    Expected End:  10/01/24            Patient will ambulate >150' with LRAD and Modified Independent   (Progressing)       Start:  09/17/24    Expected End:  10/01/24            Patient will complete Tinetti Balance Assesment with a score equal to or better than 18 to reduce falls risk.    (Progressing)       Start:  09/17/24    Expected End:  10/01/24               Pain - Adult

## 2024-09-25 NOTE — PROGRESS NOTES
Music Therapy Note    Senait Narvaez     Therapy Session  Referral Type: New referral this admission  Visit Type: Follow-up visit  Session Start Time: 1344  Session End Time: 1348  Intervention Delivery: In-person  Conflict of Service: None  Family Present for Session: None     Pre-assessment  Unable to Assess Reason: Outcomes not applicable  Pain Score: 8  Mood/Affect: Appropriate, Calm, Cooperative, Tired/lethargic         Treatment/Interventions  Music Therapy Interventions: Assessment  Interruption: No  Patient Fell Asleep at End of Session: No    Post-assessment  Unable to Assess Reason: Did not provide expressive therapy intervention  Mood/Affect: Appropriate, Calm, Cooperative  Continue Visiting: Yes  Total Session Time (min): 4 minutes    Narrative  Assessment Detail: Patient presented awake and alert, supine in bed, displaying calm and lethargic affect. Patient shared that she was experiencing immense pain and waiting for her next dose of pain medication. Patient was agreeable to music therapy services during this admission but declined intervention at this time, stating that she might be interested the next day.  Follow-up: MT to continue to follow.    Education Documentation  No documentation found.

## 2024-09-26 LAB
ALBUMIN SERPL BCP-MCNC: 2.6 G/DL (ref 3.4–5)
ALP SERPL-CCNC: 148 U/L (ref 33–110)
ALT SERPL W P-5'-P-CCNC: 51 U/L (ref 7–45)
ANION GAP SERPL CALC-SCNC: 13 MMOL/L (ref 10–20)
AST SERPL W P-5'-P-CCNC: 46 U/L (ref 9–39)
BASOPHILS # BLD MANUAL: 0.09 X10*3/UL (ref 0–0.1)
BASOPHILS NFR BLD MANUAL: 0.9 %
BILIRUB SERPL-MCNC: 2.5 MG/DL (ref 0–1.2)
BUN SERPL-MCNC: 15 MG/DL (ref 6–23)
CALCIUM SERPL-MCNC: 8.4 MG/DL (ref 8.6–10.6)
CHLORIDE SERPL-SCNC: 103 MMOL/L (ref 98–107)
CO2 SERPL-SCNC: 28 MMOL/L (ref 21–32)
CREAT SERPL-MCNC: 1.1 MG/DL (ref 0.5–1.05)
EGFRCR SERPLBLD CKD-EPI 2021: 59 ML/MIN/1.73M*2
EOSINOPHIL # BLD MANUAL: 0 X10*3/UL (ref 0–0.7)
EOSINOPHIL NFR BLD MANUAL: 0 %
ERYTHROCYTE [DISTWIDTH] IN BLOOD BY AUTOMATED COUNT: 20.1 % (ref 11.5–14.5)
GLUCOSE SERPL-MCNC: 62 MG/DL (ref 74–99)
HCT VFR BLD AUTO: 25.4 % (ref 36–46)
HGB BLD-MCNC: 8.2 G/DL (ref 12–16)
HGB RETIC QN: 30 PG (ref 28–38)
HOWELL-JOLLY BOD BLD QL SMEAR: PRESENT
HYPOCHROMIA BLD QL SMEAR: ABNORMAL
IMM GRANULOCYTES # BLD AUTO: 0.07 X10*3/UL (ref 0–0.7)
IMM GRANULOCYTES NFR BLD AUTO: 0.7 % (ref 0–0.9)
IMMATURE RETIC FRACTION: 9.1 %
INR PPP: 2.8 (ref 0.9–1.1)
LDH SERPL L TO P-CCNC: 272 U/L (ref 84–246)
LYMPHOCYTES # BLD MANUAL: 0.75 X10*3/UL (ref 1.2–4.8)
LYMPHOCYTES NFR BLD MANUAL: 7.7 %
MCH RBC QN AUTO: 30.3 PG (ref 26–34)
MCHC RBC AUTO-ENTMCNC: 32.3 G/DL (ref 32–36)
MCV RBC AUTO: 94 FL (ref 80–100)
METAMYELOCYTES # BLD MANUAL: 0.09 X10*3/UL
METAMYELOCYTES NFR BLD MANUAL: 0.9 %
MONOCYTES # BLD MANUAL: 0.84 X10*3/UL (ref 0.1–1)
MONOCYTES NFR BLD MANUAL: 8.6 %
NEUTS SEG # BLD MANUAL: 8.03 X10*3/UL (ref 1.2–7)
NEUTS SEG NFR BLD MANUAL: 81.9 %
NRBC BLD-RTO: 7.6 /100 WBCS (ref 0–0)
PAPPENHEIMER BOD BLD QL SMEAR: PRESENT
PLATELET # BLD AUTO: 387 X10*3/UL (ref 150–450)
POLYCHROMASIA BLD QL SMEAR: ABNORMAL
POTASSIUM SERPL-SCNC: 4.2 MMOL/L (ref 3.5–5.3)
PROT SERPL-MCNC: 5.9 G/DL (ref 6.4–8.2)
PROTHROMBIN TIME: 31.7 SECONDS (ref 9.8–12.8)
RBC # BLD AUTO: 2.71 X10*6/UL (ref 4–5.2)
RBC MORPH BLD: ABNORMAL
RETICS #: 0.25 X10*6/UL (ref 0.02–0.08)
RETICS/RBC NFR AUTO: 9.3 % (ref 0.5–2)
SODIUM SERPL-SCNC: 140 MMOL/L (ref 136–145)
TARGETS BLD QL SMEAR: ABNORMAL
TOTAL CELLS COUNTED BLD: 116
WBC # BLD AUTO: 9.8 X10*3/UL (ref 4.4–11.3)

## 2024-09-26 PROCEDURE — 2500000001 HC RX 250 WO HCPCS SELF ADMINISTERED DRUGS (ALT 637 FOR MEDICARE OP)

## 2024-09-26 PROCEDURE — 85045 AUTOMATED RETICULOCYTE COUNT: CPT

## 2024-09-26 PROCEDURE — 2500000002 HC RX 250 W HCPCS SELF ADMINISTERED DRUGS (ALT 637 FOR MEDICARE OP, ALT 636 FOR OP/ED)

## 2024-09-26 PROCEDURE — 85027 COMPLETE CBC AUTOMATED: CPT

## 2024-09-26 PROCEDURE — 1170000001 HC PRIVATE ONCOLOGY ROOM DAILY

## 2024-09-26 PROCEDURE — 97530 THERAPEUTIC ACTIVITIES: CPT | Mod: GP

## 2024-09-26 PROCEDURE — 36415 COLL VENOUS BLD VENIPUNCTURE: CPT

## 2024-09-26 PROCEDURE — 2500000004 HC RX 250 GENERAL PHARMACY W/ HCPCS (ALT 636 FOR OP/ED)

## 2024-09-26 PROCEDURE — 99233 SBSQ HOSP IP/OBS HIGH 50: CPT

## 2024-09-26 PROCEDURE — 97535 SELF CARE MNGMENT TRAINING: CPT | Mod: GO

## 2024-09-26 PROCEDURE — 84075 ASSAY ALKALINE PHOSPHATASE: CPT

## 2024-09-26 PROCEDURE — 85007 BL SMEAR W/DIFF WBC COUNT: CPT

## 2024-09-26 PROCEDURE — 85610 PROTHROMBIN TIME: CPT

## 2024-09-26 PROCEDURE — 83615 LACTATE (LD) (LDH) ENZYME: CPT

## 2024-09-26 RX ORDER — OXYCODONE HYDROCHLORIDE 10 MG/1
20 TABLET ORAL EVERY 4 HOURS PRN
Status: DISPENSED | OUTPATIENT
Start: 2024-09-26

## 2024-09-26 RX ORDER — BISACODYL 10 MG/1
10 SUPPOSITORY RECTAL DAILY PRN
Status: ACTIVE | OUTPATIENT
Start: 2024-09-26

## 2024-09-26 RX ORDER — HYDROMORPHONE HYDROCHLORIDE 1 MG/ML
0.5 INJECTION, SOLUTION INTRAMUSCULAR; INTRAVENOUS; SUBCUTANEOUS ONCE
Status: COMPLETED | OUTPATIENT
Start: 2024-09-26 | End: 2024-09-26

## 2024-09-26 RX ORDER — OXYCODONE HYDROCHLORIDE 15 MG/1
15 TABLET, FILM COATED, EXTENDED RELEASE ORAL EVERY 12 HOURS SCHEDULED
Status: DISCONTINUED | OUTPATIENT
Start: 2024-09-26 | End: 2024-09-28

## 2024-09-26 RX ORDER — PETROLATUM 420 MG/G
2 OINTMENT TOPICAL 2 TIMES DAILY
Start: 2024-09-26

## 2024-09-26 RX ORDER — TRIAMCINOLONE ACETONIDE 1 MG/G
CREAM TOPICAL 2 TIMES DAILY
Start: 2024-09-26

## 2024-09-26 ASSESSMENT — PAIN SCALES - GENERAL
PAINLEVEL_OUTOF10: 8
PAINLEVEL_OUTOF10: 9
PAINLEVEL_OUTOF10: 8
PAINLEVEL_OUTOF10: 9
PAINLEVEL_OUTOF10: 8
PAINLEVEL_OUTOF10: 8

## 2024-09-26 ASSESSMENT — COGNITIVE AND FUNCTIONAL STATUS - GENERAL
MOBILITY SCORE: 13
MOVING FROM LYING ON BACK TO SITTING ON SIDE OF FLAT BED WITH BEDRAILS: A LITTLE
WALKING IN HOSPITAL ROOM: A LOT
MOBILITY SCORE: 13
DAILY ACTIVITIY SCORE: 15
TURNING FROM BACK TO SIDE WHILE IN FLAT BAD: A LITTLE
PERSONAL GROOMING: A LITTLE
CLIMB 3 TO 5 STEPS WITH RAILING: TOTAL
PERSONAL GROOMING: A LITTLE
MOVING FROM LYING ON BACK TO SITTING ON SIDE OF FLAT BED WITH BEDRAILS: A LITTLE
TURNING FROM BACK TO SIDE WHILE IN FLAT BAD: A LITTLE
DRESSING REGULAR LOWER BODY CLOTHING: A LOT
DRESSING REGULAR UPPER BODY CLOTHING: A LITTLE
STANDING UP FROM CHAIR USING ARMS: A LOT
MOVING TO AND FROM BED TO CHAIR: A LOT
STANDING UP FROM CHAIR USING ARMS: A LOT
DRESSING REGULAR LOWER BODY CLOTHING: A LOT
TOILETING: A LOT
EATING MEALS: A LITTLE
DAILY ACTIVITIY SCORE: 15
TOILETING: A LOT
HELP NEEDED FOR BATHING: A LOT
WALKING IN HOSPITAL ROOM: A LOT
DRESSING REGULAR UPPER BODY CLOTHING: A LOT
HELP NEEDED FOR BATHING: A LOT
MOVING TO AND FROM BED TO CHAIR: A LOT
CLIMB 3 TO 5 STEPS WITH RAILING: TOTAL

## 2024-09-26 ASSESSMENT — PAIN - FUNCTIONAL ASSESSMENT
PAIN_FUNCTIONAL_ASSESSMENT: 0-10

## 2024-09-26 ASSESSMENT — ACTIVITIES OF DAILY LIVING (ADL): HOME_MANAGEMENT_TIME_ENTRY: 18

## 2024-09-26 ASSESSMENT — PAIN DESCRIPTION - DESCRIPTORS: DESCRIPTORS: ACHING

## 2024-09-26 NOTE — CARE PLAN
The patient's goals for the shift include      The clinical goals for the shift include pt will rate pain less than 7/10 by end of shift      Problem: Safety - Medical Restraint  Goal: Remains free of injury from restraints (Restraint for Interference with Medical Device)  Outcome: Progressing  Goal: Free from restraint(s) (Restraint for Interference with Medical Device)  Outcome: Progressing     Problem: Pain - Adult  Goal: Verbalizes/displays adequate comfort level or baseline comfort level  Outcome: Progressing     Problem: Safety - Adult  Goal: Free from fall injury  Outcome: Progressing     Problem: Skin  Goal: Decreased wound size/increased tissue granulation at next dressing change  Outcome: Progressing  Goal: Participates in plan/prevention/treatment measures  Outcome: Progressing  Goal: Prevent/manage excess moisture  Outcome: Progressing  Goal: Prevent/minimize sheer/friction injuries  Outcome: Progressing  Goal: Promote/optimize nutrition  Outcome: Progressing  Goal: Promote skin healing  Outcome: Progressing     Problem: Knowledge Deficit  Goal: Patient/family/caregiver demonstrates understanding of disease process, treatment plan, medications, and discharge instructions  Outcome: Progressing     Problem: Mechanical Ventilation  Goal: Patient Will Maintain Patent Airway  Outcome: Progressing  Goal: Oral health is maintained or improved  Outcome: Progressing  Goal: Tracheostomy will be managed safely  Outcome: Progressing  Goal: ET tube will be managed safely  Outcome: Progressing  Goal: Ability to express needs and understand communication  Outcome: Progressing  Goal: Mobility/activity is maintained at optimum level for patient  Outcome: Progressing     Problem: Pain  Goal: Takes deep breaths with improved pain control throughout the shift  Outcome: Progressing  Goal: Turns in bed with improved pain control throughout the shift  Outcome: Progressing  Goal: Walks with improved pain control throughout the  shift  Outcome: Progressing  Goal: Performs ADL's with improved pain control throughout shift  Outcome: Progressing  Goal: Participates in PT with improved pain control throughout the shift  Outcome: Progressing  Goal: Free from opioid side effects throughout the shift  Outcome: Progressing  Goal: Free from acute confusion related to pain meds throughout the shift  Outcome: Progressing     Problem: Nutrition  Goal: Less than 5 days NPO/clear liquids  Outcome: Progressing  Goal: Oral intake greater than 50%  Outcome: Progressing  Goal: Oral intake greater 75%  Outcome: Progressing  Goal: Consume prescribed supplement  Outcome: Progressing  Goal: Adequate PO fluid intake  Outcome: Progressing  Goal: Nutrition support goals are met within 48 hrs  Outcome: Progressing  Goal: Nutrition support is meeting 75% of nutrient needs  Outcome: Progressing  Goal: Tube feed tolerance  Outcome: Progressing  Goal: BG  mg/dL  Outcome: Progressing  Goal: Lab values WNL  Outcome: Progressing  Goal: Electrolytes WNL  Outcome: Progressing  Goal: Promote healing  Outcome: Progressing  Goal: Maintain stable weight  Outcome: Progressing  Goal: Reduce weight from edema/fluid  Outcome: Progressing  Goal: Gradual weight gain  Outcome: Progressing  Goal: Improve ostomy output  Outcome: Progressing     Problem: Fall/Injury  Goal: Not fall by end of shift  Outcome: Progressing  Goal: Be free from injury by end of the shift  Outcome: Progressing  Goal: Verbalize understanding of personal risk factors for fall in the hospital  Outcome: Progressing  Goal: Verbalize understanding of risk factor reduction measures to prevent injury from fall in the home  Outcome: Progressing  Goal: Use assistive devices by end of the shift  Outcome: Progressing  Goal: Pace activities to prevent fatigue by end of the shift  Outcome: Progressing

## 2024-09-26 NOTE — CARE PLAN
The patient's goals for the shift include      The clinical goals for the shift include patient will rate pain less than 8/10 this shift 9/26/24 1900      Problem: Skin  Goal: Decreased wound size/increased tissue granulation at next dressing change  Outcome: Progressing  Goal: Participates in plan/prevention/treatment measures  Outcome: Progressing  Goal: Prevent/manage excess moisture  Outcome: Progressing  Flowsheets (Taken 9/26/2024 0953)  Prevent/manage excess moisture: Cleanse incontinence/protect with barrier cream  Goal: Prevent/minimize sheer/friction injuries  Outcome: Progressing  Goal: Promote/optimize nutrition  Outcome: Progressing  Goal: Promote skin healing  Outcome: Progressing     Problem: Safety - Adult  Goal: Free from fall injury  Outcome: Progressing     Problem: Pain - Adult  Goal: Verbalizes/displays adequate comfort level or baseline comfort level  Outcome: Progressing

## 2024-09-26 NOTE — ASSESSMENT & PLAN NOTE
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 Patient was subsequently intubated due to worsening lethargic and lack of respiratory compensation from significant metabolic acidosis with concern for ACS. Vancomycin and Zosyn started for empiric coverage, patient also started on pressors for BP support. Patient was  transfused 2 units pRBCs and then underwent RBCEx on 9/11. Cardiology consulted for c/f NSTEMI vs demand ischemia (ST depressions and elevated troponin), rec treat as ACS. Course further c/b severe CHARLES with peak sCr of 5.4, and acute liver injury with severely elevated liver enzymes (ALT 4119, AST >10k). Liver US only remarkable for fatty infiltration, viral hepatitis panel negative. CHARLES and liver injury improved with supportive management. Pruritic rash noted 9/14, Derm consulted and took multiple biopsies 9/15. Rash then began to worsen to involve the groin, lateral thigh, and scalp with numerous tense bullae. Discontinued heparin given concerns for potential HIT, started bivalirudin. Patient was extubated on 9/16/2024. Started stress dose steroids given continued pressor requirements, pressors Dc'd 9/17. Patient transferred to [facility] 9/17, PT/OT rec SNF. Coumadin bridge was started 9/18. Given persistent rash with unremarkable labs, derm re-biopsied on 9/18, findings ultimately felt to be most c/w fixed drug eruption. Dermatology contacted for bx results (9/20) rec triamcinolone cream BID and interdry cloths. Leukocytosis uptrending 9/21, repeat infectious workup negative (CXR, Abd XR, blood cultures, MRSA negative 9/21).  UA with leuks, urine culture pending (9/21). On 9/25, per nursing and patient, rash seems to be worsening. Derm re-engaged recommended to apply vasoline to all eroded/denuded areas and continue triamcinolone cream to both the black plaques as well as the red areas across the trunk and thighs. 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 Wound care following. For warfarin dosing, per pharmacy, if INR between 2-3 can restart home warfarin 5mg daily, if INR < 2 restart bival. INR 3.4 on 9/25, warfarin stopped, repeat INR 2.8 9/26 and warfarin restarted. On 9/26, increased PO oxycodone and discontinued IV dilaudid in anticipation for discharge. DC pending improved pain and improvement of skin.     #Purpuric rash, suspected fixed drug eruption  #Concern for possible heparin-induced thrombocytopenia (HIT) -resolved  -Purple, reticular pattern of plaques noted around the bilateral breasts, scalp and groin,  with tense bullae. Non-erythematous, no purulence  - Some initial concern for possible heparin-induced thrombocytopenia with concurrent skin findings. Discontinued heparin 9/16 and transitioned to bivalirudin  -4 T score 0, PF4 w/ reflex BINA negative. Hematology ultimately consulted, felt there was low concern for HIT and signed off  -Differential diagnosis includes purpura secondary to infection versus coagulopathy versus vasculopathy versus small and/or medium vessel vasculitis versus calciphylaxis vs drug rash  -Dermatology consulted, work-up to date:  - Low Protein C activity  - ANCA, PR3 and MPO negative  - ISABELLE neg  - cryoglobulin labs- not enough specimen to analyze  - S/p skin punch biopsies x 2 9/15 and one on 9/18, showed SUPERFICIAL EPIDERMAL DEGENERATION WITH ERYTHROCYTE EXTRAVASATION WITH NEUTROPHILS. These findings could be seen secondary to an older trauma, or resolving erythema multiforme, a fixed drug  eruption, Edgar-Christopher syndrome, or toxic epidermal necrolysis. Favor Multifocal Fixed Drug Eruption    - Dermatology contacted (9/20) about results, recommended adding vancomycin to allergy list and starting triamcinolone  - Continue triamcinolone 0.1% cream BID (9/20-current) per derm recs  - Plan for left breat suture removal around 9/25-9/27 (per derm, sutures from flank were dissolvable)  - On 9/25, concern from pt and nursing that rash is not improving. Under breasts and groin still causing pain. Bilateral upper thighs and forehead rash improving   - Wound care re-consulted 9/25   - Re-engaged dermatology on 9/25 given worsening rash; recommended to apply vasoline to all eroded/denuded areas and continue triamcinolone cream to both the black plaques as well as the red areas across the trunk and thighs. Per dermatology, erythema is mild and not palpable, it is most likely part of the drug eruption the patient has previously been known to have     #Leukocytosis -Improving  - Likely I/s/o rash vs reactive vs infectious process  - leukocytosis noted on admission to MICU (WBC 24.8) --> 12.7 (9/25) --> 9.8   - leukocytosis improved throughout hospital stay however started uptrending 9/20; now improving on 9/25   - Blood cultures 9/10, 9/12, 9/15 all NGTD  - Strep and Legionella Ag, MRSA nares negative  - CXR (9/21) largely unremarkable, again re-demonstrated perihilar prominence seen on prior XR  - Abdominal xray (9/21) unremarkable  - Repeat blood cultures x 2 (9/21) NGTD  - UA (9/21) with 500 LE, urine cx pending     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  # ACS  - Previously managed through routine RBC exchanges q6 weeks, most recent RBCEx 3/1/24, per patient pausing on transfusions for time being  - OARRS reviewed, no aberrant behavior noted  - LDH>12,000 (now downtrending), haptoglobin <30, fibrinogen 318, retic % 5.2 on presentation, bili 10.7  - leukocytosis 24.8 on admission to MICU  - CXR (9/10) persistent  atelectasis/scarring in the left lower lung  - CT CAP (9/11) Diffuse mosaic attenuation of the lung parenchyma, which can be seen in the setting of small airway disease, pulmonary edema or acute infection  - intubated upon arrival to MICU, now s/p extubation 9/16  - started on vanc and zosyn (finished both courses prior to floor transfer)  - Procal elevated 7.09 (9/11)  - Blood cultures 9/10, 9/12, 9/15 all NGTD  - Strep and Legionella Ag, MRSA nares negative  - trop peak 1431, cards rec treat as ACS (see below)  - S/p 2u pRBCs 9/10 on arrival to MICU  - S/p exchange transfusion 9/11 AM for ACS  - Care plan from 12/6/23- For mild to moderate pain: Dilaudid 0.5mg IVP q3h as needed, for moderate to severe pain Dilaudid 1- 1.5mg q3h PRN  - On arrival to [facility], started on 0.6mg dilaudid IVP q3h PRN severe pain   - s/p 0.6mg dilaudid IVP q4h PRN breakthrough and 15mg PO oxycodone Q4 hrs severe pain (9/25)  - Per discussion with Dr. Whaley (9/25), okay to increase PO oxycodone to 15-20mg Q4hr to optimize pain for discharge to SNF.   - On 9/26, discontinued IV dilaudid and increased to 20mg PO oxycodone q4hrs for severe pain   - Bowel regimen for opioid induced constipation, zofran for opioid induced nausea, and benadrly for opioid induced pruritus  - c/w home Duloxetine 40mg daily, PO Zofran PRN, Folic Acid 1mg daily, and MVI daily     # ST depression with tropinemia, suspected 2/2 demand ischemia iso ACS  # Hx SVT  - Elevated troponins on admission, peak 1431 and now downtrending   - EKG with ST depressions, likely Type II MI due to vaso-occlusive crisis and anemia  - TTE 9/11: EF 60-65%, RV enlarged and reduced in function which appears stable when compared to last TTE (1/2024)  - Troponin leak is likely due to cardiac demand vs supply mismatch in the setting of worsening anemia and vaso-occlusive crisis  - Cardiology consulted, now signed off with indication to treat troponemia as ACS  - Lasix held in MICU 2/2 hypotension  and CHARLES, can resume as able   - Follow-up with Cardiology outpt, FUV 2/13/25    # CHARLES on CKD II, improving  - Baseline ~ SCr <2, peak 5.4 this admission. 9/21 sCr 1.58 --> 1.13 (9/25)   - likely mixed pre-renal/intra-renal, on CKD II, now polyuric phase  - FeNa 1.2%, consistent with ATN  - Severe hyperkalemia with previous peaked T waves on EKG, resolved  - Nephrology followed, now signed off, no need for HD  - s/p multiple K cocktails in MICU  - encourage increased PO intake     # Acute liver injury, improving  # Direct hyperbilirubinuria, improving  - Suspected 2/2 hemodynamic instability versus sickle cell crisis  - On presentation to  MICU ALT 2611, AST 4727, T bili 10.2, INR 3.0; continues to improve  - CT with hepatic venous congestion, patient lacks gallbladder  - Liver US with Doppler: Diffuse fatty infiltration, unremarkable doppler interrogation of the liver  - EBV IgG positive, IgM negative  - CMV IgG positive, PCR negative  - Acute hepatitis panel unremarkable  - Hepatology initially following, now signed off  - Will continue to monitor, trend daily CMP     # pHTN   - Initial c/f CTEPH as imaging findings with dilated PA, filling defects, and mosaicism  - VQ scan (6/13/23) with non anatomical basal defects and RUL defect due to scar--> not favoring CTEPH per Dr. Yi and Dr. 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 On lifelong anticoagulation, DOAC discouraged due to high risk of clotting   - home Coumadin 5mg daily initially HELD on admission to MICU 2/2 unstable course  - restarted coumadin 9/18 PM, will plan to bridge with bival, Ok'd by heme   - 9/20 INR 3.8, bival and home warfarin held. Pharmacy rec repeat INR 4 hours after bival was held. If the INR < 2, we can restart the bival, but if the INR 2-3, we can continue with warfarin monotherapy   - 9/20 repeat INR 3.7, pharmacy rec continuing to hold both medications  - 9/21 INR 3.2, per pharmacy - get repeat INR in AM 9/22, if INR between 2-3 can restart home warfarin 5mg daily  -INR 2.6 (9/24) after restarting Warfarin 5mg, will maintain INR goal of 2-3  - INR 3.4 (9/25). Held warfarin on 9/25. Repeat INR 9/26. Plan to restart if <3   - INR 2.8 (9/26), restarted warfarin, recheck INR 9/27   - Follows with Coumadin clinic outpatient  - Plan to consult vascular medicine if INR is not therapeutic on home regimen      # CAN  - cont home CPAP   - cont home Albuterol PRN     # H/O Cauda Equine syndrome  - Follows Dr. Delacruz as outpatient  - no current issues      # DISPO  - Full code- confirmed on admission  - NOK: Tati Salgado (mother) (#[phone])  - PT/OT rec SNF, pending choice/placement  - FUV PCP 10/1, Anim 10/7, Cardiology 2/13

## 2024-09-26 NOTE — PROGRESS NOTES
Occupational Therapy    Occupational Therapy Treatment    Name: Senait Narvaez  MRN: [id number]  : 1970  Date: 24  Room: [address] [place] [address]      Time Calculation  Start Time: 947  Stop Time: 100  Time Calculation (min): 18 min    Assessment:  End of Session Communication: Bedside nurse  End of Session Patient Position: Up in chair, Alarm off, not on at start of session  Plan:  Treatment Interventions: ADL retraining, Visual perceptual retraining, Functional transfer training, UE strengthening/ROM, Endurance training, Cognitive reorientation, Patient/family training, Neuromuscular reeducation, Fine motor coordination activities, UE splinting, Equipment evaluation/education, Compensatory technique education  OT Frequency: 3 times per week  OT Discharge Recommendations: Moderate intensity level of continued care (hopeful progression to LOW)  Equipment Recommended upon Discharge: Wheeled walker  OT Recommended Transfer Status: Assist of 1  OT - OK to Discharge: Yes (when medically appropriate)    Subjective   General:  OT Last Visit  OT Received On: 24  Co-Treatment: PT  Co-Treatment Reason: to maximize pts safety and rehab potential  Prior to Session Communication: Bedside nurse  Patient Position Received: Bed, 3 rail up, Alarm on  General Comment: pt in supine on arrival, willing to participate in therapy , tolerated ADLs and transfer to chair   Precautions:  Medical Precautions: Fall precautions, Oxygen therapy device and L/min  Vitals:  Vital Signs (Past 2hrs)           Lines/Tubes/Drains:       Cognition:  Overall Cognitive Status: Within Functional Limits  Orientation Level: Oriented X4    Pain Assessment:  Pain Assessment  Pain Assessment: 0-10  0-10 (Numeric) Pain Score: 8  Pain Type: Acute pain  Pain Location: Leg  Pain Orientation: Left  Pain Interventions: Therapeutic presence     Objective   Activities of Daily Living:        Grooming  Grooming Level of Assistance: Setup  Grooming Where Assessed:  Edge of bed  Grooming Comments: pt completed face hygiene         UE Dressing  UE Dressing Level of Assistance: Moderate assistance  UE Dressing Where Assessed: Edge of bed  UE Dressing Comments: pt required assistance donning and doffing gown, required assistance with pulling up sleeves to shoulder    LE Dressing  LE Dressing: Yes  Sock Level of Assistance: Maximum assistance  LE Dressing Where Assessed: Bed level          Bed Mobility/Transfers:   Bed Mobility  Bed Mobility: Yes  Bed Mobility 1  Bed Mobility 1: Sitting to supine  Level of Assistance 1: Close supervision  Bed Mobility Comments 1: HOB raised, use of bed rails    Transfers  Transfer: Yes  Transfer 1  Transfer From 1: Sit to, Stand to  Transfer to 1: Stand, Sit  Technique 1: Sit to stand, Stand to sit  Transfer Device 1: Walker  Transfer Level of Assistance 1: Moderate assistance, +2, Moderate verbal cues  Transfers 2  Transfer From 2: Bed to  Transfer to 2: Chair with arms  Transfer Device 2: Walker  Transfer Level of Assistance 2: Moderate assistance, +2         Balance:  Dynamic Standing Balance  Dynamic Standing-Balance Support: Bilateral upper extremity supported  Dynamic Standing-Level of Assistance: Moderate assistance (x2, using WW)  Static Standing Balance  Static Standing-Balance Support: Bilateral upper extremity supported  Static Standing-Level of Assistance: Minimum assistance (x2, using WW)      Outcome Measures:  [facility] Daily Activity  Putting on and taking off regular lower body clothing: A lot  Bathing (including washing, rinsing, drying): A lot  Putting on and taking off regular upper body clothing: A lot  Toileting, which includes using toilet, bedpan or urinal: A lot  Taking care of personal grooming such as brushing teeth: A little  Eating Meals: None  Daily Activity - Total Score: 15     Education Documentation  No documentation found.  Education Comments  No comments found.        Goals:  Encounter Problems       Encounter  Problems (Active)       ADLs       Patient will complete toileting with SBA and min cues.   (Progressing)       Start:  09/17/24    Expected End:  10/01/24            Patient will complete LB dressing with SBA and min cues.   (Progressing)       Start:  09/17/24    Expected End:  10/01/24            Patient will complete UB dressing with set up assist.   (Progressing)       Start:  09/17/24    Expected End:  10/01/24            Patient will complete grooming with set up assist.   (Progressing)       Start:  09/17/24    Expected End:  10/01/24                   BALANCE       Patient will demo standing balance for at least 5 min with SBA in prep for standing ADLs. (Progressing)       Start:  09/17/24    Expected End:  10/01/24               COGNITION/SAFETY       Patient will score WNL on cognitive assessment. (Progressing)       Start:  09/17/24    Expected End:  10/01/24               MOBILITY       Patient will complete bed mobility with SBA and min cues.    (Progressing)       Start:  09/17/24    Expected End:  10/01/24            Pt. Will demo short household distance functional mobility with SBA using LRAD.   (Progressing)       Start:  09/17/24    Expected End:  10/01/24               TRANSFERS       Pt. Will complete stand pivot transfer with SBA assist with min cues and using LRAD.   (Progressing)       Start:  09/17/24    Expected End:  10/01/24                     09/26/24 at 1:45 PM   Sultana Silva, OT   [phone]

## 2024-09-26 NOTE — SIGNIFICANT EVENT
Rapid Response RN Note    RADAR score 6 due to the following VS: T 36.5 °C; HR 95; RR 18; BP 93/61; SPO2 95%.     Reviewed above VS with bedside RN via phone.  Vital signs within patient's current trends.  No acute change in condition.  No interventions by rapid response team indicated at this time.    Staff to page rapid response for any concerns or acute change in condition/VS.

## 2024-09-26 NOTE — PROGRESS NOTES
Physical Therapy    Physical Therapy Treatment    Patient Name: Senait Narvaez  MRN: [id number]  Department: [facility]  Room: [facility]4024-A  Today's Date: 9/26/2024  Time Calculation  Start Time: 0948  Stop Time: 1005  Time Calculation (min): 17 min         Assessment/Plan   PT Assessment  PT Assessment Results: Decreased strength, Decreased endurance, Impaired balance, Decreased mobility, Orthopedic restrictions, Pain  Rehab Prognosis: Good  Barriers to Discharge: none  Evaluation/Treatment Tolerance: Patient limited by fatigue, Patient limited by pain  Medical Staff Made Aware: Yes  Strengths: Attitude of self, Coping skills  Barriers to Participation: Comorbidities  End of Session Communication: Bedside nurse  Assessment Comment: The pt demonstrated gradual functional progression with decreased assistance with bed mobility and transfers using a wheeled walker.  End of Session Patient Position: Up in chair, Alarm on  PT Plan  Inpatient/Swing Bed or Outpatient: Inpatient  PT Plan  Treatment/Interventions: Bed mobility, Transfer training, Gait training, Balance training, Strengthening, Endurance training, Therapeutic exercise, Therapeutic activity, Home exercise program  PT Plan: Ongoing PT  PT Frequency: 3 times per week  PT Discharge Recommendations: Moderate intensity level of continued care  Equipment Recommended upon Discharge: Wheeled walker  PT Recommended Transfer Status: Assist x2  PT - OK to Discharge: Yes      General Visit Information:   PT  Visit  PT Received On: 09/26/24  Response to Previous Treatment: Patient reporting fatigue but able to participate.  General  Missed Visit: Yes  Missed Visit Reason: Other (Comment)  Co-Treatment: OT  Co-Treatment Reason: PT/OT co-tx for increased pt safety and participation.  Prior to Session Communication: Bedside nurse  Patient Position Received: Bed, 3 rail up, Alarm on  Preferred Learning Style: verbal, visual, written  General Comment: The pt was pleasant, cooperative and  willing to participate in therapy.    Subjective   Precautions:  Precautions  Hearing/Visual Limitations: Hearing and vision WFL  Medical Precautions: Fall precautions, Oxygen therapy device and L/min  Precautions Comment: Pt in compliance with precautions throughout PT session.    Vital Signs (Past 2hrs)        Date/Time Vitals Session Patient Position Pulse Resp SpO2 BP MAP (mmHg)    09/26/24 0948 During PT  Lying  87  --  96 %  --  --                   Vital Signs Comment: vitals stable     Objective   Pain:  Pain Assessment  Pain Assessment: 0-10  0-10 (Numeric) Pain Score: 8  Pain Type: Acute pain  Pain Location: Leg  Pain Orientation: Left  Pain Descriptors: Aching  Pain Frequency: Constant/continuous  Pain Onset: Ongoing  Clinical Progression: Not changed  Pain Interventions: Therapeutic presence  Response to Interventions: Pain stable  Cognition:  Cognition  Overall Cognitive Status: Within Functional Limits  Orientation Level: Oriented X4  Coordination:  Movements are Fluid and Coordinated: Yes  Coordination Comment: WFL  Postural Control:  Postural Control  Postural Control: Impaired  Posture Comment: Pt presented with good sitting posture and impaired standing posture using a wheeled walker.  Static Sitting Balance  Static Sitting-Balance Support: Bilateral upper extremity supported, Feet supported  Static Sitting-Level of Assistance: Close supervision  Static Sitting-Comment/Number of Minutes: Sitting EOB  Dynamic Sitting Balance  Dynamic Sitting-Balance Support: Bilateral upper extremity supported, Feet supported  Dynamic Sitting-Level of Assistance: Close supervision  Dynamic Sitting-Comments: Sitting EOB  Static Standing Balance  Static Standing-Balance Support: Bilateral upper extremity supported  Static Standing-Level of Assistance: Moderate assistance (x 2)  Static Standing-Comment/Number of Minutes: using a wheeled walker  Dynamic Standing Balance  Dynamic Standing-Balance Support: Bilateral upper  extremity supported  Dynamic Standing-Level of Assistance: Moderate assistance (x 2)  Dynamic Standing-Comments: using a wheeled walker  Extremity/Trunk Assessments:  RUE   RUE : Within Functional Limits  LUE   LUE: Within Functional Limits  RLE   RLE : Exceptions to WFL  AROM RLE (degrees)  RLE AROM Comment: WFL  Strength RLE  R Hip Flexion: 3+/5  R Knee Flexion: 4-/5  R Knee Extension: 4-/5  R Ankle Dorsiflexion: 4+/5  R Ankle Plantar Flexion: 4+/5  LLE   LLE : Exceptions to WFL  AROM LLE (degrees)  LLE AROM Comment: WFL  Strength LLE  L Hip Flexion: 3-/5  L Knee Flexion: 3/5  L Knee Extension: 3/5  L Ankle Dorsiflexion: 3+/5  L Ankle Plantar Flexion: 3+/5  Activity Tolerance:  Activity Tolerance  Endurance: Decreased tolerance for upright activites  Treatments:  Therapeutic Exercise  Therapeutic Exercise Performed: No  Therapeutic Exercise Activity 1: ankle pumps x 10  Therapeutic Exercise Activity 2: heel slides x 10  Therapeutic Exercise Activity 3: SAQ x 10  Therapeutic Exercise Activity 4: SLR x 10  Therapeutic Exercise Activity 5: Hip ABD x 10    Therapeutic Activity  Therapeutic Activity Performed: Yes  Therapeutic Activity 1: The pt performed OOB activity.    Balance/Neuromuscular Re-Education  Balance/Neuromuscular Re-Education Activity Performed: Yes  Balance/Neuromuscular Re-Education Activity 1: Moderate assist x 2 static standing balance using a wheeled walker.  Balance/Neuromuscular Re-Education Activity 2: Moderate assist x 2 dynamic standing balance using a wheeled walker    Bed Mobility  Bed Mobility: Yes  Bed Mobility 1  Bed Mobility 1: Supine to sitting  Level of Assistance 1: Close supervision  Bed Mobility Comments 1: HOB slightly elevated    Ambulation/Gait Training  Ambulation/Gait Training Performed: No  Transfers  Transfer: Yes  Transfer 1  Transfer From 1: Sit to  Transfer to 1: Stand  Transfer Device 1: Walker  Transfer Level of Assistance 1: Moderate assistance, +2  Trials/Comments 1:  gradual improvement  Transfers 2  Transfer From 2: Stand to  Transfer to 2: Sit  Transfer Device 2: Walker  Transfer Level of Assistance 2: Moderate assistance, +2  Trials/Comments 2: gradual improvement  Transfers 3  Transfer From 3: Bed to  Transfer to 3: Chair with arms  Transfer Device 3: Walker  Transfer Level of Assistance 3: Moderate assistance, +2  Trials/Comments 3: gradual improvement    Stairs  Stairs: No    Outcome Measures:  [facility] Basic Mobility  Turning from your back to your side while in a flat bed without using bedrails: A little  Moving from lying on your back to sitting on the side of a flat bed without using bedrails: A little  Moving to and from bed to chair (including a wheelchair): A lot  Standing up from a chair using your arms (e.g. wheelchair or bedside chair): A lot  To walk in hospital room: A lot  Climbing 3-5 steps with railing: Total  Basic Mobility - Total Score: 13    OP EDUCATION:  Outpatient Education  Individual(s) Educated: Patient  Education Provided: Body Mechanics, Fall Risk, Home Safety, POC, Posture  Patient Response to Education: Patient/Caregiver Verbalized Understanding of Information, Patient/Caregiver Performed Return Demonstration of Exercises/Activities    Encounter Problems       Encounter Problems (Active)       PT Problem       Patient will complete supine to sit and sit to supine Independent  (Progressing)       Start:  09/17/24    Expected End:  10/01/24            Patient will perform sit<>stand transfer with LRAD, and Modified Independent  (Progressing)       Start:  09/17/24    Expected End:  10/01/24            Patient will ambulate >150' with LRAD and Modified Independent  (Progressing)       Start:  09/17/24    Expected End:  10/01/24            Patient will complete Tinetti Balance Assesment with a score equal to or better than 18 to reduce falls risk.    (Progressing)       Start:  09/17/24    Expected End:  10/01/24               Pain - Adult

## 2024-09-26 NOTE — PROGRESS NOTES
Senait Narvaez is a 54 y.o. female on day 16 of admission presenting with Sickle cell disease with crisis and other complication.    Subjective   Seen at bedside this AM. Stated that pain is slightly better compared to yesterday. Skin feels the same, burning at sites of excoriation. Discussed dermatology recommendations and that wound healing will take weeks. She is understanding and had no further questions. We discussed stopping her IV dilaudid today and increasing her PO dose of oxycodone per Dr. Lugo recommendations. She was agreeable to plan and had no further questions. Denies any fever/chills, N/V, change in appetite, chest pain, SOB, or abdominal pain     Objective     Physical Exam  Vitals reviewed.   Constitutional:       Appearance: Normal appearance.   HENT:      Head: Normocephalic and atraumatic.      Nose: Nose normal.      Mouth/Throat:      Mouth: Mucous membranes are moist.      Pharynx: Oropharynx is clear.   Eyes:      Extraocular Movements: Extraocular movements intact.      Pupils: Pupils are equal, round, and reactive to light.   Cardiovascular:      Rate and Rhythm: Normal rate and regular rhythm.      Pulses: Normal pulses.      Heart sounds: Normal heart sounds.   Pulmonary:      Effort: Pulmonary effort is normal.      Breath sounds: Normal breath sounds.   Abdominal:      General: Bowel sounds are normal.      Palpations: Abdomen is soft.   Musculoskeletal:         General: Normal range of motion.   Skin:     General: Skin is warm.      Comments: On the bilateral breasts, flanks, umbilical area, there are black plaques with some desquamating scale, areas of erosions on the inferior aspect of the breasts as well as inner thighs ; ill defined erythema on the abdomen, breasts, and thighs    Neurological:      General: No focal deficit present.      Mental Status: She is alert and oriented to person, place, and time. Mental status is at baseline.   Psychiatric:         Mood and Affect: Mood  "normal.         Behavior: Behavior normal.       Last Recorded Vitals  Blood pressure 112/73, pulse 103, temperature 37 °C (98.6 °F), temperature source Temporal, resp. rate 16, height 1.651 m (5' 5\"), weight 120 kg (264 lb 12.4 oz), SpO2 96%.  Intake/Output last 3 Shifts:  No intake/output data recorded.    Relevant Results  Scheduled medications  bisacodyl, 10 mg, rectal, Daily  folic acid, 1 mg, oral, Daily  metoprolol tartrate, 12.5 mg, oral, BID  nystatin, 1 Application, Topical, BID  pantoprazole, 40 mg, intravenous, Daily  polyethylene glycol, 17 g, oral, BID  sennosides, 2 tablet, oral, BID  thiamine, 100 mg, intravenous, Daily  triamcinolone, , Topical, BID  warfarin, 5 mg, oral, Daily  white petrolatum, , Topical, BID    Continuous medications     PRN medications  PRN medications: acetaminophen, albuterol, alteplase, dextrose, dextrose, glucagon, glucagon, ondansetron, oxyCODONE, oxygen, sodium chloride       Assessment/Plan   Assessment & Plan  Sickle cell disease with crisis and other complication  Senait Narvaez is a 53 year-old female with a PMHx of Hgb SC disease (previously on exchange transfusions q4-6 weeks, last transfusion 3/1/24), hx of SVC syndrome, 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 Patient was subsequently intubated due to worsening lethargic and lack of respiratory compensation from significant metabolic acidosis with concern for ACS. Vancomycin and Zosyn started for empiric coverage, patient also started on pressors for BP support. Patient was  transfused 2 units pRBCs and then underwent RBCEx on 9/11. Cardiology consulted for c/f NSTEMI vs demand ischemia (ST depressions and elevated troponin), rec treat as ACS. Course further c/b severe CHARLES with peak sCr " of 5.4, and acute liver injury with severely elevated liver enzymes (ALT 4119, AST >10k). Liver US only remarkable for fatty infiltration, viral hepatitis panel negative. CHARLES and liver injury improved with supportive management. Pruritic rash noted 9/14, Derm consulted and took multiple biopsies 9/15. Rash then began to worsen to involve the groin, lateral thigh, and scalp with numerous tense bullae. Discontinued heparin given concerns for potential HIT, started bivalirudin. Patient was extubated on 9/16/2024. Started stress dose steroids given continued pressor requirements, pressors Dc'd 9/17. Patient transferred to [facility] 9/17, PT/OT rec SNF. Coumadin bridge was started 9/18. Given persistent rash with unremarkable labs, derm re-biopsied on 9/18, findings ultimately felt to be most c/w fixed drug eruption. Dermatology contacted for bx results (9/20) rec triamcinolone cream BID and interdry cloths. Leukocytosis uptrending 9/21, repeat infectious workup negative (CXR, Abd XR, blood cultures, MRSA negative 9/21). UA with leuks, urine culture pending (9/21). On 9/25, per nursing and patient, rash seems to be worsening. Derm re-engaged recommended to apply vasoline to all eroded/denuded areas and continue triamcinolone cream to both the black plaques as well as the red areas across the trunk and thighs. 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 Wound care following. For warfarin dosing, per pharmacy, if INR between 2-3 can restart home warfarin 5mg daily, if INR < 2 restart bival. INR 3.4 on 9/25, warfarin stopped, repeat INR 2.8 9/26 and warfarin restarted. On 9/26, increased PO oxycodone and discontinued IV dilaudid in anticipation for discharge. DC pending improved pain and improvement of skin.     #Purpuric rash, suspected fixed drug eruption  #Concern for possible heparin-induced thrombocytopenia (HIT)  -resolved  -Purple, reticular pattern of plaques noted around the bilateral breasts, scalp and groin,  with tense bullae. Non-erythematous, no purulence  - Some initial concern for possible heparin-induced thrombocytopenia with concurrent skin findings. Discontinued heparin 9/16 and transitioned to bivalirudin  -4 T score 0, PF4 w/ reflex BINA negative. Hematology ultimately consulted, felt there was low concern for HIT and signed off  -Differential diagnosis includes purpura secondary to infection versus coagulopathy versus vasculopathy versus small and/or medium vessel vasculitis versus calciphylaxis vs drug rash  -Dermatology consulted, work-up to date:  - Low Protein C activity  - ANCA, PR3 and MPO negative  - ISABELLE neg  - cryoglobulin labs- not enough specimen to analyze  - S/p skin punch biopsies x 2 9/15 and one on 9/18, showed SUPERFICIAL EPIDERMAL DEGENERATION WITH ERYTHROCYTE EXTRAVASATION WITH NEUTROPHILS. These findings could be seen secondary to an older trauma, or resolving erythema multiforme, a fixed drug eruption, Edgar-Christopher syndrome, or toxic epidermal necrolysis. Favor Multifocal Fixed Drug Eruption    - Dermatology contacted (9/20) about results, recommended adding vancomycin to allergy list and starting triamcinolone  - Continue triamcinolone 0.1% cream BID (9/20-current) per derm recs  - Plan for left breat suture removal around 9/25-9/27 (per derm, sutures from flank were dissolvable)  - On 9/25, concern from pt and nursing that rash is not improving. Under breasts and groin still causing pain. Bilateral upper thighs and forehead rash improving   - Wound care re-consulted 9/25   - Re-engaged dermatology on 9/25 given worsening rash; recommended to apply vasoline to all eroded/denuded areas and continue triamcinolone cream to both the black plaques as well as the red areas across the trunk and thighs. Per dermatology, erythema is mild and not palpable, it is most likely part of the drug  eruption the patient has previously been known to have     #Leukocytosis -Improving  - Likely I/s/o rash vs reactive vs infectious process  - leukocytosis noted on admission to MICU (WBC 24.8) --> 12.7 (9/25) --> 9.8   - leukocytosis improved throughout hospital stay however started uptrending 9/20; now improving on 9/25   - Blood cultures 9/10, 9/12, 9/15 all NGTD  - Strep and Legionella Ag, MRSA nares negative  - CXR (9/21) largely unremarkable, again re-demonstrated perihilar prominence seen on prior XR  - Abdominal xray (9/21) unremarkable  - Repeat blood cultures x 2 (9/21) NGTD  - UA (9/21) with 500 LE, urine cx pending     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  # ACS  - Previously managed through routine RBC exchanges q6 weeks, most recent RBCEx 3/1/24, per patient pausing on transfusions for time being  - OARRS reviewed, no aberrant behavior noted  - LDH>12,000 (now downtrending), haptoglobin <30, fibrinogen 318, retic % 5.2 on presentation, bili 10.7  - leukocytosis 24.8 on admission to MICU  - CXR (9/10) persistent atelectasis/scarring in the left lower lung  - CT CAP (9/11) Diffuse mosaic attenuation of the lung parenchyma, which can be seen in the setting of small airway disease, pulmonary edema or acute infection  - intubated upon arrival to MICU, now s/p extubation 9/16  - started on vanc and zosyn (finished both courses prior to floor transfer)  - Procal elevated 7.09 (9/11)  - Blood cultures 9/10, 9/12, 9/15 all NGTD  - Strep and Legionella Ag, MRSA nares negative  - trop peak 1431, cards rec treat as ACS (see below)  - [facility]  - S/p exchange transfusion 9/11 AM for ACS  - Care plan from 12/6/23- For mild to moderate pain: Dilaudid 0.5mg IVP q3h as needed, for moderate to severe pain Dilaudid 1- 1.5mg q3h PRN  - On arrival to [facility], started on 0.6mg dilaudid IVP q3h PRN severe pain   - s/p 0.6mg dilaudid IVP q4h PRN breakthrough and 15mg PO oxycodone Q4 hrs severe pain (9/25)  -  Per discussion with Dr. Whaley (9/25), okay to increase PO oxycodone to 15-20mg Q4hr to optimize pain for discharge to SNF.   - On 9/26, discontinued IV dilaudid and increased to 20mg PO oxycodone q4hrs for severe pain   - Bowel regimen for opioid induced constipation, zofran for opioid induced nausea, and benadrly for opioid induced pruritus  - c/w home Duloxetine 40mg daily, PO Zofran PRN, Folic Acid 1mg daily, and MVI daily     # ST depression with tropinemia, suspected 2/2 demand ischemia iso ACS  # Hx SVT  - Elevated troponins on admission, peak 1431 and now downtrending   - EKG with ST depressions, likely Type II MI due to vaso-occlusive crisis and anemia  - TTE 9/11: EF 60-65%, RV enlarged and reduced in function which appears stable when compared to last TTE (1/2024)  - Troponin leak is likely due to cardiac demand vs supply mismatch in the setting of worsening anemia and vaso-occlusive crisis  - Cardiology consulted, now signed off with indication to treat troponemia as ACS  - Lasix held in MICU 2/2 hypotension and CHARLES, can resume as able   - Follow-up with Cardiology outpt, FUV 2/13/25    # CHARLES on CKD II, improving  - Baseline ~ SCr <2, peak 5.4 this admission. 9/21 sCr 1.58 --> 1.13 (9/25)   - likely mixed pre-renal/intra-renal, on CKD II, now polyuric phase  - FeNa 1.2%, consistent with ATN  - Severe hyperkalemia with previous peaked T waves on EKG, resolved  - Nephrology followed, now signed off, no need for HD  - s/p multiple K cocktails in MICU  - encourage increased PO intake     # Acute liver injury, improving  # Direct hyperbilirubinuria, improving  - Suspected 2/2 hemodynamic instability versus sickle cell crisis  - On presentation to  MICU ALT 2611, AST 4727, T bili 10.2, INR 3.0; continues to improve  - CT with hepatic venous congestion, patient lacks gallbladder  - Liver US with Doppler: Diffuse fatty infiltration, unremarkable doppler interrogation of the liver  - EBV IgG positive, IgM  negative  - CMV IgG positive, PCR negative  - Acute hepatitis panel unremarkable  - Hepatology initially following, now signed off  - Will continue to monitor, trend daily CMP     # pHTN   - Initial c/f CTEPH as imaging findings with dilated PA, filling defects, and mosaicism  - VQ scan (6/13/23) with non anatomical basal defects and RUL defect due to scar--> not favoring CTEPH per Dr. Yi and Dr. 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 On lifelong anticoagulation, DOAC discouraged due to high risk of clotting   - home Coumadin 5mg daily initially HELD on admission to MICU 2/2 unstable course  - restarted coumadin 9/18 PM, will plan to bridge with bival, Ok'd by heme   - 9/20 INR 3.8, bival and home warfarin held. Pharmacy rec repeat INR 4 hours after bival was held. If the INR < 2, we can restart the bival, but if the INR 2-3, we can continue with warfarin monotherapy   - 9/20 repeat INR 3.7, pharmacy rec continuing to hold both medications  - 9/21 INR 3.2, per pharmacy - get repeat INR in AM 9/22, if INR between 2-3 can restart home warfarin 5mg daily  -INR 2.6 (9/24) after restarting Warfarin 5mg, will maintain INR goal of 2-3  - INR 3.4 (9/25). Held warfarin on 9/25. Repeat INR 9/26. Plan to restart if <3   - INR 2.8 (9/26), restarted warfarin, recheck INR 9/27   - Follows with Coumadin clinic outpatient  - Plan to  consult vascular medicine if INR is not therapeutic on home regimen      # CAN  - cont home CPAP   - cont home Albuterol PRN     # H/O Cauda Equine syndrome  - Follows Dr. Delacruz as outpatient  - no current issues      # DISPO  - Full code- confirmed on admission  - NOK: Tati Salgado (mother) (#[phone])  - PT/OT rec SNF, pending choice/placement  - FUV PCP 10/1, Anim 10/7, Cardiology 2/13      I spent >60 minutes in the professional and overall care of this patient.    Assessment and plan discussed with attending physician Dr. Tayo Kerns, APRN-CNP

## 2024-09-27 ENCOUNTER — APPOINTMENT (OUTPATIENT)
Dept: RADIOLOGY | Facility: HOSPITAL | Age: 54
DRG: 811 | End: 2024-09-27
Payer: COMMERCIAL

## 2024-09-27 ENCOUNTER — APPOINTMENT (OUTPATIENT)
Dept: RADIOLOGY | Facility: HOSPITAL | Age: 54
End: 2024-09-27
Payer: COMMERCIAL

## 2024-09-27 LAB
ALBUMIN SERPL BCP-MCNC: 2.7 G/DL (ref 3.4–5)
ALP SERPL-CCNC: 173 U/L (ref 33–110)
ALT SERPL W P-5'-P-CCNC: 52 U/L (ref 7–45)
ANION GAP SERPL CALC-SCNC: 11 MMOL/L (ref 10–20)
AST SERPL W P-5'-P-CCNC: 53 U/L (ref 9–39)
BASOPHILS # BLD AUTO: 0.06 X10*3/UL (ref 0–0.1)
BASOPHILS NFR BLD AUTO: 0.5 %
BILIRUB SERPL-MCNC: 2.5 MG/DL (ref 0–1.2)
BUN SERPL-MCNC: 17 MG/DL (ref 6–23)
CALCIUM SERPL-MCNC: 8.6 MG/DL (ref 8.6–10.6)
CHLORIDE SERPL-SCNC: 101 MMOL/L (ref 98–107)
CO2 SERPL-SCNC: 31 MMOL/L (ref 21–32)
CREAT SERPL-MCNC: 1.64 MG/DL (ref 0.5–1.05)
EGFRCR SERPLBLD CKD-EPI 2021: 37 ML/MIN/1.73M*2
EOSINOPHIL # BLD AUTO: 0.16 X10*3/UL (ref 0–0.7)
EOSINOPHIL NFR BLD AUTO: 1.3 %
ERYTHROCYTE [DISTWIDTH] IN BLOOD BY AUTOMATED COUNT: 20.7 % (ref 11.5–14.5)
GLUCOSE SERPL-MCNC: 76 MG/DL (ref 74–99)
HCT VFR BLD AUTO: 26.5 % (ref 36–46)
HGB BLD-MCNC: 8.5 G/DL (ref 12–16)
HGB RETIC QN: 29 PG (ref 28–38)
HOWELL-JOLLY BOD BLD QL SMEAR: PRESENT
IMM GRANULOCYTES # BLD AUTO: 0.13 X10*3/UL (ref 0–0.7)
IMM GRANULOCYTES NFR BLD AUTO: 1.1 % (ref 0–0.9)
IMMATURE RETIC FRACTION: 9.7 %
INR PPP: 2.5 (ref 0.9–1.1)
LDH SERPL L TO P-CCNC: 322 U/L (ref 84–246)
LYMPHOCYTES # BLD AUTO: 1.22 X10*3/UL (ref 1.2–4.8)
LYMPHOCYTES NFR BLD AUTO: 10.2 %
MCH RBC QN AUTO: 30.4 PG (ref 26–34)
MCHC RBC AUTO-ENTMCNC: 32.1 G/DL (ref 32–36)
MCV RBC AUTO: 95 FL (ref 80–100)
MONOCYTES # BLD AUTO: 1.15 X10*3/UL (ref 0.1–1)
MONOCYTES NFR BLD AUTO: 9.6 %
NEUTROPHILS # BLD AUTO: 9.27 X10*3/UL (ref 1.2–7.7)
NEUTROPHILS NFR BLD AUTO: 77.3 %
NRBC BLD-RTO: 6 /100 WBCS (ref 0–0)
PAPPENHEIMER BOD BLD QL SMEAR: PRESENT
PLATELET # BLD AUTO: 325 X10*3/UL (ref 150–450)
POLYCHROMASIA BLD QL SMEAR: NORMAL
POTASSIUM SERPL-SCNC: 4.3 MMOL/L (ref 3.5–5.3)
PROT SERPL-MCNC: 6.3 G/DL (ref 6.4–8.2)
PROTHROMBIN TIME: 28.6 SECONDS (ref 9.8–12.8)
RBC # BLD AUTO: 2.8 X10*6/UL (ref 4–5.2)
RBC MORPH BLD: NORMAL
RETICS #: 0.27 X10*6/UL (ref 0.02–0.08)
RETICS/RBC NFR AUTO: 9.8 % (ref 0.5–2)
SCHISTOCYTES BLD QL SMEAR: NORMAL
SICKLE CELLS BLD QL SMEAR: NORMAL
SODIUM SERPL-SCNC: 139 MMOL/L (ref 136–145)
TARGETS BLD QL SMEAR: NORMAL
WBC # BLD AUTO: 12 X10*3/UL (ref 4.4–11.3)

## 2024-09-27 PROCEDURE — 97530 THERAPEUTIC ACTIVITIES: CPT | Mod: GP

## 2024-09-27 PROCEDURE — 85610 PROTHROMBIN TIME: CPT

## 2024-09-27 PROCEDURE — 1170000001 HC PRIVATE ONCOLOGY ROOM DAILY

## 2024-09-27 PROCEDURE — 85045 AUTOMATED RETICULOCYTE COUNT: CPT

## 2024-09-27 PROCEDURE — 83615 LACTATE (LD) (LDH) ENZYME: CPT

## 2024-09-27 PROCEDURE — 2500000004 HC RX 250 GENERAL PHARMACY W/ HCPCS (ALT 636 FOR OP/ED)

## 2024-09-27 PROCEDURE — 85025 COMPLETE CBC W/AUTO DIFF WBC: CPT

## 2024-09-27 PROCEDURE — 99233 SBSQ HOSP IP/OBS HIGH 50: CPT

## 2024-09-27 PROCEDURE — 80053 COMPREHEN METABOLIC PANEL: CPT

## 2024-09-27 PROCEDURE — 2500000005 HC RX 250 GENERAL PHARMACY W/O HCPCS

## 2024-09-27 PROCEDURE — 71045 X-RAY EXAM CHEST 1 VIEW: CPT | Performed by: RADIOLOGY

## 2024-09-27 PROCEDURE — 2500000001 HC RX 250 WO HCPCS SELF ADMINISTERED DRUGS (ALT 637 FOR MEDICARE OP)

## 2024-09-27 PROCEDURE — 2500000002 HC RX 250 W HCPCS SELF ADMINISTERED DRUGS (ALT 637 FOR MEDICARE OP, ALT 636 FOR OP/ED)

## 2024-09-27 PROCEDURE — 70450 CT HEAD/BRAIN W/O DYE: CPT | Performed by: RADIOLOGY

## 2024-09-27 PROCEDURE — 36415 COLL VENOUS BLD VENIPUNCTURE: CPT

## 2024-09-27 PROCEDURE — 99233 SBSQ HOSP IP/OBS HIGH 50: CPT | Performed by: STUDENT IN AN ORGANIZED HEALTH CARE EDUCATION/TRAINING PROGRAM

## 2024-09-27 PROCEDURE — 70450 CT HEAD/BRAIN W/O DYE: CPT

## 2024-09-27 PROCEDURE — 71045 X-RAY EXAM CHEST 1 VIEW: CPT

## 2024-09-27 ASSESSMENT — PAIN - FUNCTIONAL ASSESSMENT
PAIN_FUNCTIONAL_ASSESSMENT: 0-10

## 2024-09-27 ASSESSMENT — PAIN SCALES - GENERAL
PAINLEVEL_OUTOF10: 8

## 2024-09-27 ASSESSMENT — COGNITIVE AND FUNCTIONAL STATUS - GENERAL
MOVING FROM LYING ON BACK TO SITTING ON SIDE OF FLAT BED WITH BEDRAILS: A LITTLE
MOBILITY SCORE: 16
CLIMB 3 TO 5 STEPS WITH RAILING: TOTAL
STANDING UP FROM CHAIR USING ARMS: A LITTLE
TURNING FROM BACK TO SIDE WHILE IN FLAT BAD: A LITTLE
MOVING TO AND FROM BED TO CHAIR: A LITTLE
WALKING IN HOSPITAL ROOM: A LITTLE

## 2024-09-27 ASSESSMENT — PAIN DESCRIPTION - DESCRIPTORS: DESCRIPTORS: ACHING

## 2024-09-27 NOTE — CARE PLAN
Problem: Skin  Goal: Decreased wound size/increased tissue granulation at next dressing change  Outcome: Progressing  Goal: Participates in plan/prevention/treatment measures  9/27/2024 0923 by Kelin Rivera RN  Flowsheets (Taken 9/27/2024 0923)  Participates in plan/prevention/treatment measures: Increase activity/out of bed for meals  9/27/2024 0923 by Kelin Rivera RN  Outcome: Progressing  Flowsheets (Taken 9/27/2024 0923)  Participates in plan/prevention/treatment measures: Increase activity/out of bed for meals  Goal: Prevent/manage excess moisture  Outcome: Progressing  Goal: Prevent/minimize sheer/friction injuries  Outcome: Progressing  Goal: Promote/optimize nutrition  Outcome: Progressing  Goal: Promote skin healing  Outcome: Progressing   The patient's goals for the shift include      The clinical goals for the shift include patient will rate pain less than 8/10 this shift 9/27/24 1900    Over the shift, the patient did not make progress toward the following goals. Barriers to progression include ***. Recommendations to address these barriers include ***.

## 2024-09-27 NOTE — CARE PLAN
The patient's goals for the shift include      The clinical goals for the shift include patient will rate pain less than 8/10 this shift 9/27/24 3510      Problem: Pain - Adult  Goal: Verbalizes/displays adequate comfort level or baseline comfort level  Outcome: Progressing     Problem: Safety - Adult  Goal: Free from fall injury  Outcome: Progressing     Problem: Skin  Goal: Decreased wound size/increased tissue granulation at next dressing change  Outcome: Progressing  Goal: Participates in plan/prevention/treatment measures  Outcome: Progressing  Goal: Prevent/manage excess moisture  Outcome: Progressing  Goal: Prevent/minimize sheer/friction injuries  Outcome: Progressing  Goal: Promote/optimize nutrition  Outcome: Progressing  Goal: Promote skin healing  Outcome: Progressing

## 2024-09-27 NOTE — SIGNIFICANT EVENT
Rapid Response Nurse Note: [] Rapid Response [x] RADAR alert: Score 6  Pager time: [phone]  Arrival time: 112  Event end time: 113  Location: [facility] 4024  [x] Phone triage     Rapid response initiated by:  [] Rapid Response RN [] Family [] Nursing Supervisor [] Physician   [x] RADAR auto-page [] Sepsis auto-page [] RN [] RT   [] NP/PA [] Other:     Primary reason for call:   [] BAT [] New CPAP/BiPAP [] Bleeding [] Change in mental status   [] Chest pain [] Code blue [] FiO2 >/= 50% [] HR </= 40 bpm   [] HR >/= 130 bpm [] Hyperglycemia [] Hypoglycemia [x] RADAR    [] RR </= 8 bpm [] RR >/= 30 bpm [] SBP </= 90 mmHg [] SpO2 < 90%   [] Seizure [] Sepsis [] Staff concern:     Initial VS and/or RADAR VS: T 37.0 °C; HR 91; RR 16; /69; SPO2 93%.  Providers present at bedside:   Objective/Subjective data relevant to event:   Interventions:  [x] None [] ABG [] Assist w/ICU transfer [] BAT paged    [] Bag mask [] Blood [] Cardioversion [] Code Blue   [] Code blue for intubation [] Code status changed [] Chest x-ray [] EKG   [] IV fluid/bolus [] KUB x-ray [] Labs/cultures [] Medication   [] Nebulizer treatment [] NIPPV (CPAP/BiPAP) [] Oxygen [] Oral airway   [] Peripheral IV [] Palliative care consult [] CT/MRI [] Sepsis protocol    [] Suctioned [] Other:     Name of ICU Provider contacted (if applicable):   Outcome:  [] Coded and  [] Code blue for intubation [] Coded and transferred to ICU []  on division   [x] Remained on division (no change) [] Remained on division + additional monitoring [] Remained in ED [] Transferred to ED   [] Transferred to ICU [] Transferred to inpatient status [] Transferred for interventions (procedure) [] Transferred to ICU stepdown    [] Transferred to surgery [] Transferred to telemetry [] Sepsis protocol [] STEMI protocol   [] Stroke protocol [x] Bedside nurse instructed to page rapid response for any concerns or acute change in condition/VS     Additional Comments:     Dillon  auto-page received for a radar score of 6 with the above listed vital signs.  Vital signs were confirmed and reviewed with primary RN.  She is at her current baseline and RN has no concerns.  There are no indications for interventions by Rapid Response at this time.  RN to contact Rapid Response with any future concerns or signs of clinical decompensation.

## 2024-09-27 NOTE — PROGRESS NOTES
DERMATOLOGY CONSULT PROGRESS NOTE  Name: Senait Narvaez  MRN: [id number]  : 1970    Subjective   Patient was sitting up in her chair speaking with low, minimal speech.      Objective    Vitals:    24 2355 24 0404 24 0750 24 1122   BP: 106/74 115/74 103/68 102/69   BP Location: Right arm Right arm Left arm Left arm   Patient Position: Lying Lying Lying Lying   Pulse: 97 101 96 91   Resp: 18 16 16 16   Temp: 36.7 °C (98.1 °F) 36.3 °C (97.3 °F) 37 °C (98.6 °F) 37 °C (98.6 °F)   TempSrc: Temporal Temporal Temporal Temporal   SpO2: 95% 94% 94% 93%   Weight:       Height:          Exam    Physical Exam   GEN: no acute distress  NEURO: moving all extremities   EYES: conjunctiva and eyelids normal. No conjunctival injection or erosions appreciated  ENT:   - Lips: swollen  - Teeth/gums: normal  - Oropharynx: swollen tongue with normal mucosa, clear airway  NECK: normal and symmetric.   CV: no varicosities, warmth or tenderness of extremities.  GI: Flat abdomen. Non-tender. No hepatosplenomegaly.  LYMPH: no LAD   EXTREMITIES: no distal digital clubbing, cyanosis, petechiae  SKIN: A full body skin exam including scalp, face, eyes, ears, neck, trunk, bilateral upper & lower extremities, toenails and fingernails were examined with the following findings: On bilateral breasts, flanks, umbilical area, there are black plaques with some desquamating scale, areas of erosion on the inferior aspect of the breasts as well as inner thighs; ill defined, fading erythema on the abdomen, breasts and thighs      Medications:  Scheduled Meds: folic acid, 1 mg, oral, Daily  metoprolol tartrate, 12.5 mg, oral, BID  nystatin, 1 Application, Topical, BID  oxyCODONE ER, 15 mg, oral, q12h BEATA  pantoprazole, 40 mg, intravenous, Daily  polyethylene glycol, 17 g, oral, BID  sennosides, 2 tablet, oral, BID  thiamine, 100 mg, intravenous, Daily  triamcinolone, , Topical, BID  warfarin, 5 mg, oral, Daily  white petrolatum, ,  Topical, BID       Continuous Infusions:     PRN Meds: PRN medications: acetaminophen, albuterol, alteplase, bisacodyl, dextrose, dextrose, glucagon, glucagon, ondansetron, oxyCODONE, oxygen, sodium chloride     Results:  Results for orders placed or performed during the hospital encounter of 09/10/24 (from the past 24 hour(s))   Lactate dehydrogenase   Result Value Ref Range     (H) 84 - 246 U/L   Reticulocytes   Result Value Ref Range    Retic % 9.8 (H) 0.5 - 2.0 %    Retic Absolute 0.274 (H) 0.018 - 0.083 x10*6/uL    Reticulocyte Hemoglobin 29 28 - 38 pg    Immature Retic fraction 9.7 <=16.0 %   Comprehensive metabolic panel   Result Value Ref Range    Glucose 76 74 - 99 mg/dL    Sodium 139 136 - 145 mmol/L    Potassium 4.3 3.5 - 5.3 mmol/L    Chloride 101 98 - 107 mmol/L    Bicarbonate 31 21 - 32 mmol/L    Anion Gap 11 10 - 20 mmol/L    Urea Nitrogen 17 6 - 23 mg/dL    Creatinine 1.64 (H) 0.50 - 1.05 mg/dL    eGFR 37 (L) >60 mL/min/1.73m*2    Calcium 8.6 8.6 - 10.6 mg/dL    Albumin 2.7 (L) 3.4 - 5.0 g/dL    Alkaline Phosphatase 173 (H) 33 - 110 U/L    Total Protein 6.3 (L) 6.4 - 8.2 g/dL    AST 53 (H) 9 - 39 U/L    Bilirubin, Total 2.5 (H) 0.0 - 1.2 mg/dL    ALT 52 (H) 7 - 45 U/L   Protime-INR   Result Value Ref Range    Protime 28.6 (H) 9.8 - 12.8 seconds    INR 2.5 (H) 0.9 - 1.1   CBC and Auto Differential   Result Value Ref Range    WBC 12.0 (H) 4.4 - 11.3 x10*3/uL    nRBC 6.0 (H) 0.0 - 0.0 /100 WBCs    RBC 2.80 (L) 4.00 - 5.20 x10*6/uL    Hemoglobin 8.5 (L) 12.0 - 16.0 g/dL    Hematocrit 26.5 (L) 36.0 - 46.0 %    MCV 95 80 - 100 fL    MCH 30.4 26.0 - 34.0 pg    MCHC 32.1 32.0 - 36.0 g/dL    RDW 20.7 (H) 11.5 - 14.5 %    Platelets 325 150 - 450 x10*3/uL    Neutrophils % 77.3 40.0 - 80.0 %    Immature Granulocytes %, Automated 1.1 (H) 0.0 - 0.9 %    Lymphocytes % 10.2 13.0 - 44.0 %    Monocytes % 9.6 2.0 - 10.0 %    Eosinophils % 1.3 0.0 - 6.0 %    Basophils % 0.5 0.0 - 2.0 %    Neutrophils Absolute  9.27 (H) 1.20 - 7.70 x10*3/uL    Immature Granulocytes Absolute, Automated 0.13 0.00 - 0.70 x10*3/uL    Lymphocytes Absolute 1.22 1.20 - 4.80 x10*3/uL    Monocytes Absolute 1.15 (H) 0.10 - 1.00 x10*3/uL    Eosinophils Absolute 0.16 0.00 - 0.70 x10*3/uL    Basophils Absolute 0.06 0.00 - 0.10 x10*3/uL   Morphology   Result Value Ref Range    RBC Morphology See Below     Polychromasia Mild     RBC Fragments Few     Sickle Cells Few     Target Cells Many     [place] Bodies Present     Pappenheimer Bodies Present      *Note: Due to a large number of results and/or encounters for the requested time period, some results have not been displayed. A complete set of results can be found in Results Review.        Assessment/Plan   Senait Narvaez is a 54 y.o. 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 Dermatology was re-consulted for worsening rash.    Differential diagnoses: favor multifocal fixed drug eruption    Impression: The erythema is fading on the trunk and extremities with the triamcinolone 0.1% cream. The erosions appreciated on the breasts have decreased scaling, and the patient reports less pain. The improvement of the patient's rash with triamcinolone 0.1% cream supports the diagnosis of an attenuated multifocal fixed drug eruption due to the suspected culprit being removed earlier during admission.     Recommendations:  -Continue triamcinolone 0.1% cream to both black plaques as well as the red areas across the trunk and thighs  -Apply vaseline to all eroded/denuded areas. Mepilex transfer sheets may be used for  areas of friction.    The patient was seen and discussed with attending physician Dr. Sands. The assessment and plan was communicated to the care team.    Thank you for the consultation and for the opportunity to contribute to the care of this patient.      RIAN TAVAREZ   Medical Student, Dermatology  Epic chat (preferred)  Team pager [phone]      I have reviewed the medical student documentation and verified the findings in the note as written with additions or exceptions as stated in the body of the note.     Aracely Rg, DO   PGY-3, Dermatology  Epic chat (preferred)  Team pager [phone]    I saw and evaluated the patient. I personally obtained the key and critical portions of the history and physical exam or was physically present for key and critical portions performed by the resident. I reviewed the resident's documentation and discussed the patient with the resident. I agree with the resident's medical decision making as documented in the note.    Shad Sands MD

## 2024-09-27 NOTE — SIGNIFICANT EVENT
Rapid Response RN Note    RADAR score 6 due to the following VS: T 36.4 °C; HR 92; RR 16; /63; SPO2 95%.     Reviewed above VS with bedside RN via phone.  Vital signs within patient's current trends.  No acute change in condition.  No interventions by rapid response team indicated at this time.    Staff to page rapid response for any concerns or acute change in condition/VS. Randall Germain RN.

## 2024-09-27 NOTE — PROGRESS NOTES
Physical Therapy    Physical Therapy Treatment    Patient Name: Senait Narvaez  MRN: [id number]  Department: [facility]  Room: [facility]4024-A  Today's Date: 9/27/2024  Time Calculation  Start Time: 1422  Stop Time: 1502  Time Calculation (min): 40 min         Assessment/Plan   PT Assessment  PT Assessment Results: Decreased strength, Decreased endurance, Impaired balance, Decreased mobility, Orthopedic restrictions, Pain  Rehab Prognosis: Good  Barriers to Discharge: none  Evaluation/Treatment Tolerance: Patient limited by fatigue, Patient limited by pain  Medical Staff Made Aware: Yes  Strengths: Attitude of self, Coping skills  Barriers to Participation: Comorbidities  End of Session Communication: Bedside nurse  Assessment Comment: The pt demonstrated continued gradual functional progression with decreased assistance with sit to stand to sit transfers and amb using a wheeled walker.  End of Session Patient Position: Bed, 3 rail up, Alarm off, caregiver present  PT Plan  Inpatient/Swing Bed or Outpatient: Inpatient  PT Plan  Treatment/Interventions: Bed mobility, Transfer training, Gait training, Balance training, Strengthening, Endurance training, Therapeutic exercise, Therapeutic activity, Home exercise program  PT Plan: Ongoing PT  PT Frequency: 3 times per week  PT Discharge Recommendations: Moderate intensity level of continued care  Equipment Recommended upon Discharge: Wheeled walker  PT Recommended Transfer Status: Assist x1  PT - OK to Discharge: Yes      General Visit Information:   PT  Visit  Response to Previous Treatment: Patient reporting fatigue but able to participate.  General  Family/Caregiver Present: Yes  Caregiver Feedback: Mother present with good support.  Prior to Session Communication: Bedside nurse  Patient Position Received: Up in chair, Alarm off, caregiver present  Preferred Learning Style: verbal, visual, written  General Comment: The pt was pleasant, cooperative and willing to participate in  therapy. The pt was sitting in chair upon arrival on 2L humidified O2 at 84%. RN notified and increased O2 to 4L humidified O2, which increased SpO2 to 93%.    Subjective   Precautions:  Precautions  Hearing/Visual Limitations: Hearing and vision WFL  Medical Precautions: Fall precautions, Oxygen therapy device and L/min  Precautions Comment: Pt in compliance with precautions throughout PT session.    Vital Signs (Past 2hrs)        Date/Time Vitals Session Patient Position Pulse Resp SpO2 BP MAP (mmHg)    09/27/24 1422 During PT  Sitting  99  --  84 %  --  --                   Vital Signs Comment: vitals stable on 4L humidified O2     Objective   Pain:  Pain Assessment  Pain Assessment: 0-10  0-10 (Numeric) Pain Score: 8  Pain Type: Acute pain  Pain Location: Leg  Pain Orientation: Left  Pain Descriptors: Aching  Pain Frequency: Constant/continuous  Pain Onset: Ongoing  Clinical Progression: Not changed  Patient's Stated Pain Goal: No pain  Pain Interventions: Therapeutic presence  Response to Interventions: Pain stable  Cognition:  Cognition  Overall Cognitive Status: Within Functional Limits  Orientation Level: Oriented X4  Coordination:  Movements are Fluid and Coordinated: Yes  Coordination Comment: WFL  Postural Control:  Postural Control  Postural Control: Within Functional Limits  Posture Comment: The pt presented with good sitting and standing posture using a wheeled walker.  Static Sitting Balance  Static Sitting-Balance Support: Bilateral upper extremity supported, Feet supported  Static Sitting-Level of Assistance: Distant supervision  Static Sitting-Comment/Number of Minutes: Sitting in chair  Dynamic Sitting Balance  Dynamic Sitting-Balance Support: Bilateral upper extremity supported, Feet supported  Dynamic Sitting-Level of Assistance: Distant supervision  Dynamic Sitting-Comments: Sitting in chair  Static Standing Balance  Static Standing-Balance Support: Bilateral upper extremity supported  Static  Standing-Level of Assistance: Contact guard  Static Standing-Comment/Number of Minutes: using a wheeled walker  Dynamic Standing Balance  Dynamic Standing-Balance Support: Bilateral upper extremity supported  Dynamic Standing-Level of Assistance: Contact guard  Dynamic Standing-Comments: using a wheeled walker  Extremity/Trunk Assessments:  RUE   RUE : Within Functional Limits  LUE   LUE: Within Functional Limits  RLE   RLE : Exceptions to WFL  AROM RLE (degrees)  RLE AROM Comment: WFL  Strength RLE  R Hip Flexion: 4/5  R Knee Flexion: 4+/5  R Knee Extension: 4+/5  R Ankle Dorsiflexion: 5/5  R Ankle Plantar Flexion: 5/5  LLE   LLE : Exceptions to WFL  AROM LLE (degrees)  LLE AROM Comment: WFL  Strength LLE  L Hip Flexion: 3/5  L Knee Flexion: 4-/5  L Knee Extension: 4-/5  L Ankle Dorsiflexion: 4+/5  L Ankle Plantar Flexion: 4+/5  Activity Tolerance:  Activity Tolerance  Endurance: Decreased tolerance for upright activites  Treatments:  Therapeutic Exercise  Therapeutic Exercise Performed: No    Therapeutic Activity  Therapeutic Activity Performed: Yes  Therapeutic Activity 1: The pt performed OOB activity using a wheeled walker with gradual improvement.    Balance/Neuromuscular Re-Education  Balance/Neuromuscular Re-Education Activity Performed: Yes  Balance/Neuromuscular Re-Education Activity 1: CGA static standing balance using a wheeled walker.  Balance/Neuromuscular Re-Education Activity 2: CGA dynamic standing balance using a wheeled walker.    Bed Mobility  Bed Mobility: Yes  Bed Mobility 1  Bed Mobility 1: Sitting to supine  Level of Assistance 1: Minimum assistance  Bed Mobility Comments 1: log roll technique    Ambulation/Gait Training  Ambulation/Gait Training Performed: Yes  Ambulation/Gait Training 1  Surface 1: Level tile  Device 1: Rolling walker  Assistance 1: Contact guard  Quality of Gait 1: Decreased step length, Antalgic (unsteady, decreased efrain, decreased endurance, gradual  improvement)  Comments/Distance (ft) 1: 3ft  Transfers  Transfer: Yes  Transfer 1  Transfer From 1: Sit to  Transfer to 1: Stand  Transfer Device 1: Walker  Transfer Level of Assistance 1: Minimum assistance  Transfers 2  Transfer From 2: Stand to  Transfer to 2: Sit  Transfer Device 2: Walker  Transfer Level of Assistance 2: Close supervision  Transfers 3  Transfer From 3: Chair with arms to  Transfer to 3: Bed  Transfer Device 3: Walker  Transfer Level of Assistance 3: Contact guard    Stairs  Stairs: No    Outcome Measures:  [facility] Basic Mobility  Turning from your back to your side while in a flat bed without using bedrails: A little  Moving from lying on your back to sitting on the side of a flat bed without using bedrails: A little  Moving to and from bed to chair (including a wheelchair): A little  Standing up from a chair using your arms (e.g. wheelchair or bedside chair): A little  To walk in hospital room: A little  Climbing 3-5 steps with railing: Total  Basic Mobility - Total Score: 16    OP EDUCATION:  Outpatient Education  Individual(s) Educated: Patient, Parent  Education Provided: Body Mechanics, Fall Risk, Home Safety, POC, Posture, Post-Op Precautions  Patient Response to Education: Patient/Caregiver Verbalized Understanding of Information, Patient/Caregiver Performed Return Demonstration of Exercises/Activities    Encounter Problems       Encounter Problems (Active)       PT Problem       Patient will complete supine to sit and sit to supine Independent  (Progressing)       Start:  09/17/24    Expected End:  10/01/24            Patient will perform sit<>stand transfer with LRAD, and Modified Independent  (Progressing)       Start:  09/17/24    Expected End:  10/01/24            Patient will ambulate >150' with LRAD and Modified Independent  (Progressing)       Start:  09/17/24    Expected End:  10/01/24            Patient will complete Tinetti Balance Assesment with a score equal to or better than  18 to reduce falls risk.    (Progressing)       Start:  09/17/24    Expected End:  10/01/24               Pain - Adult

## 2024-09-27 NOTE — PROGRESS NOTES
09/24/24 1300   Discharge Planning   Who is requesting discharge planning? Patient   Home or Post Acute Services Post acute facilities (Rehab/SNF/etc)   Type of Post Acute Facility Services Skilled nursing   Expected Discharge Disposition SNF     Care Transitions Note  09/24/24  Met with Senait and  Tati ([phone]) at bedside to discuss discharge planning. [facility] is able to accept as long as patient is off IV pain meds. Team aware, plan was to wean off IV dilaudid tomorrow per rounds this morning. Will send updated clinicals from today.    Senait and Tati voiced concerns about Senait's rash. Tati would like the rash to improve before going to SNF. Tati states Dr. Whaley plans to check in with them. This LSW sent message to TEVIN Sahni outpatient SW to update her. Tati had questions about SNF providing injections or oral pain meds, LSW sent message to [facility] for more info. Mother also adds that Senait needs to be admitted for exchanges every 3-4 weeks, wonders how this would be handled if still at SNF. LSW will update team and follow for needs that arise.   YULI Zamorano     UPDATE 09/27/24  Per medical team, Senait's rash is stable and improving, although not totally better. They discussed with Senait and Tati that it will take time to heal. She has weaned off IV pain meds, now on oxy. Plan to go to SNF on Monday. LSW to start pre-cert for [facility].

## 2024-09-27 NOTE — PROGRESS NOTES
Music Therapy Note    Senait Narvaez     Therapy Session  Referral Type: New referral this admission  Visit Type: Follow-up visit  Session Start Time: 1423  Session End Time: 1426  Intervention Delivery: In-person  Conflict of Service: Declined treatment     Pre-assessment  Pain Score: 6  Mood/Affect: Appropriate, Calm, Cooperative         Treatment/Interventions       Post-assessment  Total Session Time (min): 3 minutes    Narrative  Assessment Detail: Patient presented awake and alert, supine in bed, displaying calm but uncomfortable affect. Patient declined music intervention at this time, expressing gratitude for MT's attempt.  Follow-up: MT to continue to follow.    Education Documentation  No documentation found.

## 2024-09-27 NOTE — PROGRESS NOTES
"Senait Narvaez is a 54 y.o. female on day 17 of admission presenting with Sickle cell disease with crisis and other complication.    Subjective   Seen at bedside, seems a little more lethargic today. Denies N/V/D/C, chest pain, cough, shortness of breath. States her tongue feels numb and thinks her lips are a little swollen, also having L hand numbness. No focal motor deficits or        Objective     Physical Exam  Constitutional:       General: She is not in acute distress.     Appearance: She is obese. She is not toxic-appearing.   HENT:      Head: Normocephalic and atraumatic.   Eyes:      General: No scleral icterus.     Extraocular Movements: Extraocular movements intact.   Cardiovascular:      Rate and Rhythm: Normal rate and regular rhythm.   Pulmonary:      Effort: No respiratory distress.   Abdominal:      General: Abdomen is flat.      Palpations: Abdomen is soft.      Tenderness: There is no abdominal tenderness.   Musculoskeletal:         General: No swelling or tenderness.   Skin:     Coloration: Skin is not jaundiced.      Findings: No bruising or erythema.   Neurological:      General: No focal deficit present.      Mental Status: She is oriented to person, place, and time. Mental status is at baseline.      Motor: No weakness.         Last Recorded Vitals  Blood pressure 102/69, pulse 91, temperature 37 °C (98.6 °F), temperature source Temporal, resp. rate 16, height 1.651 m (5' 5\"), weight 120 kg (264 lb 12.4 oz), SpO2 93%.  Intake/Output last 3 Shifts:  No intake/output data recorded.        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 Patient was subsequently intubated due to worsening lethargic and lack of respiratory compensation from significant metabolic acidosis with concern for ACS. Vancomycin and Zosyn started for empiric coverage, patient also started on pressors for BP support. Patient was  transfused 2 units pRBCs and then underwent RBCEx on 9/11. Cardiology consulted for c/f NSTEMI vs demand ischemia (ST depressions and elevated troponin), rec treat as ACS. Course further c/b severe CHARLES with peak sCr of 5.4, and acute liver injury with severely elevated liver enzymes (ALT 4119, AST >10k). Liver US only remarkable for fatty infiltration, viral hepatitis panel negative. CHARLES and liver injury improved with supportive management. Pruritic rash noted 9/14, Derm consulted and took multiple biopsies 9/15. Rash then began to worsen to involve the groin, lateral thigh, and scalp with numerous tense bullae. Discontinued heparin given concerns for potential HIT, started bivalirudin. Patient was extubated on 9/16/2024. Started stress dose steroids given continued pressor requirements, pressors Dc'd 9/17. Patient transferred to [facility] 9/17, PT/OT rec SNF. Coumadin bridge was started 9/18. Given persistent rash with unremarkable labs, derm re-biopsied on 9/18, findings ultimately felt to be most c/w fixed drug eruption. Dermatology contacted for bx results (9/20) rec triamcinolone cream BID and interdry cloths. Leukocytosis uptrending 9/21, repeat infectious workup negative (CXR, Abd XR, blood cultures, MRSA negative 9/21). UA with leuks (9/21). On 9/25, per nursing and patient, rash seems to be worsening. Derm re-engaged recommended to apply vasoline to all eroded/denuded areas and continue triamcinolone cream to both the black plaques as well as the red areas across the trunk and thighs. Per dermatology, erythema is mild and not  palpable, it is most likely part of the drug eruption the patient has previously been known to have. Wound care following. For warfarin dosing, per pharmacy, if INR between 2-3 can restart home warfarin 5mg daily, if INR < 2 restart bival. INR 3.4 on 9/25, warfarin stopped, repeat INR 2.8 9/26 and warfarin restarted. On 9/26, increased PO oxycodone and discontinued IV dilaudid in anticipation for discharge. DC pending improved pain and improvement of skin.     #Purpuric rash, suspected fixed drug eruption  #Concern for possible heparin-induced thrombocytopenia (HIT) -resolved  -Purple, reticular pattern of plaques noted around the bilateral breasts, scalp and groin,  with tense bullae. Non-erythematous, no purulence  - Some initial concern for possible heparin-induced thrombocytopenia with concurrent skin findings. Discontinued heparin 9/16 and transitioned to bivalirudin  -4 T score 0, PF4 w/ reflex BINA negative. Hematology ultimately consulted, felt there was low concern for HIT and signed off  -Differential diagnosis includes purpura secondary to infection versus coagulopathy versus vasculopathy versus small and/or medium vessel vasculitis versus calciphylaxis vs drug rash  -Dermatology consulted, work-up to date:  - Low Protein C activity  - ANCA, PR3 and MPO negative  - ISABELLE neg  - cryoglobulin labs- not enough specimen to analyze  - S/p skin punch biopsies x 2 9/15 and one on 9/18, showed SUPERFICIAL EPIDERMAL DEGENERATION WITH ERYTHROCYTE EXTRAVASATION WITH NEUTROPHILS. These findings could be seen secondary to an older trauma, or resolving erythema multiforme, a fixed drug eruption, Edgar-Christopher syndrome, or toxic epidermal necrolysis. Favor Multifocal Fixed Drug Eruption    - Dermatology contacted (9/20) about results, recommended adding vancomycin to allergy list and starting triamcinolone  - Continue triamcinolone 0.1% cream BID (9/20-current) per derm recs  - left breat suture removed 9/26 (per derm,  sutures from flank were dissolvable)  - On 9/25, concern from pt and nursing that rash is not improving. Under breasts and groin still causing pain. Bilateral upper thighs and forehead rash improving   - Wound care re-consulted 9/25   - Re-engaged dermatology on 9/25 given worsening rash; recommended to apply vasoline to all eroded/denuded areas and continue triamcinolone cream to both the black plaques as well as the red areas across the trunk and thighs. Per dermatology, erythema is mild and not palpable, it is most likely part of the drug eruption the patient has previously been known to have     #Leukocytosis -Improving  - Likely I/s/o rash vs reactive vs infectious process  - leukocytosis noted on admission to MICU (WBC 24.8) --> 12.7 (9/25) --> 9.8   - leukocytosis improved throughout hospital stay however started uptrending 9/20; now improving on 9/25   - Blood cultures 9/10, 9/12, 9/15 all NGTD  - Strep and Legionella Ag, MRSA nares negative  - CXR (9/21) largely unremarkable, again re-demonstrated perihilar prominence seen on prior XR  - Abdominal xray (9/21) unremarkable  - Repeat blood cultures x 2 (9/21) NGTD  - UA (9/21) with 500 LE, no culture resulted.      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  # ACS  - Previously managed through routine RBC exchanges q6 weeks, most recent RBCEx 3/1/24, per patient pausing on transfusions for time being  - OARRS reviewed, no aberrant behavior noted  - LDH>12,000 (now downtrending), haptoglobin <30, fibrinogen 318, retic % 5.2 on presentation, bili 10.7  - leukocytosis 24.8 on admission to MICU  - CXR (9/10) persistent atelectasis/scarring in the left lower lung  - CT CAP (9/11) Diffuse mosaic attenuation of the lung parenchyma, which can be seen in the setting of small airway disease, pulmonary edema or acute infection  - intubated upon arrival to MICU, now s/p extubation 9/16  - started on vanc and zosyn (finished both courses prior to floor transfer)  - Procal elevated  7.09 (9/11)  - Blood cultures 9/10, 9/12, 9/15 all NGTD  - Strep and Legionella Ag, MRSA nares negative  - trop peak 1431, cards rec treat as ACS (see below)  - S/p 2u pRBCs 9/10 on arrival to MICU  - S/p exchange transfusion 9/11 AM for ACS  - Care plan from 12/6/23- For mild to moderate pain: Dilaudid 0.5mg IVP q3h as needed, for moderate to severe pain Dilaudid 1- 1.5mg q3h PRN  - On arrival to [facility], started on 0.6mg dilaudid IVP q3h PRN severe pain   - s/p 0.6mg dilaudid IVP q4h PRN breakthrough and 15mg PO oxycodone Q4 hrs severe pain (9/25)  - Per discussion with Dr. Whaley (9/25), okay to increase PO oxycodone to 15-20mg Q4hr to optimize pain for discharge to SNF.   - On 9/26, discontinued IV dilaudid and increased to 20mg PO oxycodone q4hrs for severe pain, also added 15mg Oxycontin BID per discussion with Dr Whaley   - Bowel regimen for opioid induced constipation, zofran for opioid induced nausea, and benadrly for opioid induced pruritus  - c/w home Duloxetine 40mg daily, PO Zofran PRN, Folic Acid 1mg daily, and MVI daily     # ST depression with tropinemia, suspected 2/2 demand ischemia iso ACS  # Hx SVT  - Elevated troponins on admission, peak 1431 and now downtrending   - EKG with ST depressions, likely Type II MI due to vaso-occlusive crisis and anemia  - TTE 9/11: EF 60-65%, RV enlarged and reduced in function which appears stable when compared to last TTE (1/2024)  - Troponin leak is likely due to cardiac demand vs supply mismatch in the setting of worsening anemia and vaso-occlusive crisis  - Cardiology consulted, now signed off with indication to treat troponemia as ACS  - Lasix held in MICU 2/2 hypotension and CHARLES, can resume as able   - Follow-up with Cardiology outpt, FUV 2/13/25     # CHARLES on CKD II, improving  - Baseline ~ SCr <2, peak 5.4 this admission. 9/21 sCr 1.58 --> 1.13 (9/25)   - likely mixed pre-renal/intra-renal, on CKD II, now polyuric phase  - FeNa 1.2%, consistent with ATN  - Severe  hyperkalemia with previous peaked T waves on EKG, resolved  - Nephrology followed, now signed off, no need for HD  - s/p multiple K cocktails in MICU  - encourage increased PO intake     # Acute liver injury, improving  # Direct hyperbilirubinuria, improving  - Suspected 2/2 hemodynamic instability versus sickle cell crisis  - [facility], T bili 10.2, INR 3.0; continues to improve  - CT with hepatic venous congestion, patient lacks gallbladder  - Liver US with Doppler: Diffuse fatty infiltration, unremarkable doppler interrogation of the liver  - EBV IgG positive, IgM negative  - CMV IgG positive, PCR negative  - Acute hepatitis panel unremarkable  - Hepatology initially following, now signed off  - Will continue to monitor, trend daily CMP     # pHTN   - Initial c/f CTEPH as imaging findings with dilated PA, filling defects, and mosaicism  - VQ scan (6/13/23) with non anatomical basal defects and RUL defect due to scar--> not favoring CTEPH per Dr. Yi and Dr. 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 On lifelong anticoagulation, DOAC discouraged due to high risk of clotting   - home Coumadin 5mg daily initially HELD on admission to MICU 2/2 unstable course  - restarted coumadin 9/18 PM, will plan to bridge with bival, Ok'd by heme   - 9/20 INR 3.8, bival and home warfarin held. Pharmacy  rec repeat INR 4 hours after bival was held. If the INR < 2, we can restart the bival, but if the INR 2-3, we can continue with warfarin monotherapy   - 9/20 repeat INR 3.7, pharmacy rec continuing to hold both medications  - 9/21 INR 3.2, per pharmacy - get repeat INR in AM 9/22, if INR between 2-3 can restart home warfarin 5mg daily  -INR 2.6 (9/24) after restarting Warfarin 5mg, will maintain INR goal of 2-3  - INR 3.4 (9/25). Held warfarin on 9/25.   - Repeat INR 2.8 (9/26), restarted warfarin  - INR 9/27 2.5, CTM daily   - Follows with Coumadin clinic outpatient  - Plan to consult vascular medicine if INR is not therapeutic on home regimen      # CAN  - cont home CPAP   - cont home Albuterol PRN     # H/O Cauda Equine syndrome  - Follows Dr. Delacruz as outpatient  - no current issues      # DISPO  - Full code- confirmed on admission  - NOK: Tati Salgado (mother) (#[phone])  - PT/OT rec SNF, pending choice/placement  - FUV PCP 10/1, Anim 10/7, Cardiology 2/13       I spent >60 minutes in the professional and overall care of this patient.      GRISEL Redmond-CNP

## 2024-09-28 LAB
ALBUMIN SERPL BCP-MCNC: 3 G/DL (ref 3.4–5)
ALP SERPL-CCNC: 176 U/L (ref 33–110)
ALT SERPL W P-5'-P-CCNC: 47 U/L (ref 7–45)
ANION GAP SERPL CALC-SCNC: 14 MMOL/L (ref 10–20)
AST SERPL W P-5'-P-CCNC: 46 U/L (ref 9–39)
B DERMAT AB TITR SER: NEGATIVE {TITER}
BASOPHILS # BLD AUTO: 0.04 X10*3/UL (ref 0–0.1)
BASOPHILS NFR BLD AUTO: 0.3 %
BILIRUB SERPL-MCNC: 2.6 MG/DL (ref 0–1.2)
BUN SERPL-MCNC: 24 MG/DL (ref 6–23)
C NEOFORM AB TITR SER AGGL: NEGATIVE {TITER}
CALCIUM SERPL-MCNC: 8.4 MG/DL (ref 8.6–10.6)
CHLORIDE SERPL-SCNC: 98 MMOL/L (ref 98–107)
CO2 SERPL-SCNC: 28 MMOL/L (ref 21–32)
CREAT SERPL-MCNC: 2.65 MG/DL (ref 0.5–1.05)
EGFRCR SERPLBLD CKD-EPI 2021: 21 ML/MIN/1.73M*2
EOSINOPHIL # BLD AUTO: 0.19 X10*3/UL (ref 0–0.7)
EOSINOPHIL NFR BLD AUTO: 1.6 %
ERYTHROCYTE [DISTWIDTH] IN BLOOD BY AUTOMATED COUNT: 21.7 % (ref 11.5–14.5)
GLUCOSE SERPL-MCNC: 78 MG/DL (ref 74–99)
HCT VFR BLD AUTO: 25.4 % (ref 36–46)
HGB BLD-MCNC: 8.2 G/DL (ref 12–16)
HGB RETIC QN: 29 PG (ref 28–38)
IMM GRANULOCYTES # BLD AUTO: 0.12 X10*3/UL (ref 0–0.7)
IMM GRANULOCYTES NFR BLD AUTO: 1 % (ref 0–0.9)
IMMATURE RETIC FRACTION: 9.1 %
INR PPP: 2.7 (ref 0.9–1.1)
LDH SERPL L TO P-CCNC: 251 U/L (ref 84–246)
LYMPHOCYTES # BLD AUTO: 1.13 X10*3/UL (ref 1.2–4.8)
LYMPHOCYTES NFR BLD AUTO: 9.6 %
MCH RBC QN AUTO: 30.1 PG (ref 26–34)
MCHC RBC AUTO-ENTMCNC: 32.3 G/DL (ref 32–36)
MCV RBC AUTO: 93 FL (ref 80–100)
MONOCYTES # BLD AUTO: 1.39 X10*3/UL (ref 0.1–1)
MONOCYTES NFR BLD AUTO: 11.8 %
NEUTROPHILS # BLD AUTO: 8.92 X10*3/UL (ref 1.2–7.7)
NEUTROPHILS NFR BLD AUTO: 75.7 %
NRBC BLD-RTO: 6.4 /100 WBCS (ref 0–0)
PAPPENHEIMER BOD BLD QL SMEAR: PRESENT
PLATELET # BLD AUTO: 280 X10*3/UL (ref 150–450)
POLYCHROMASIA BLD QL SMEAR: NORMAL
POTASSIUM SERPL-SCNC: 4.9 MMOL/L (ref 3.5–5.3)
PROT SERPL-MCNC: 6.5 G/DL (ref 6.4–8.2)
PROTHROMBIN TIME: 30.9 SECONDS (ref 9.8–12.8)
RBC # BLD AUTO: 2.72 X10*6/UL (ref 4–5.2)
RBC MORPH BLD: NORMAL
RETICS #: 0.35 X10*6/UL (ref 0.02–0.08)
RETICS/RBC NFR AUTO: 12.8 % (ref 0.5–2)
SICKLE CELLS BLD QL SMEAR: NORMAL
SODIUM SERPL-SCNC: 135 MMOL/L (ref 136–145)
TARGETS BLD QL SMEAR: NORMAL
WBC # BLD AUTO: 11.8 X10*3/UL (ref 4.4–11.3)

## 2024-09-28 PROCEDURE — 2500000005 HC RX 250 GENERAL PHARMACY W/O HCPCS

## 2024-09-28 PROCEDURE — 83615 LACTATE (LD) (LDH) ENZYME: CPT

## 2024-09-28 PROCEDURE — 99233 SBSQ HOSP IP/OBS HIGH 50: CPT

## 2024-09-28 PROCEDURE — 2500000004 HC RX 250 GENERAL PHARMACY W/ HCPCS (ALT 636 FOR OP/ED)

## 2024-09-28 PROCEDURE — 2500000001 HC RX 250 WO HCPCS SELF ADMINISTERED DRUGS (ALT 637 FOR MEDICARE OP)

## 2024-09-28 PROCEDURE — 5A09357 ASSISTANCE WITH RESPIRATORY VENTILATION, LESS THAN 24 CONSECUTIVE HOURS, CONTINUOUS POSITIVE AIRWAY PRESSURE: ICD-10-PCS | Performed by: STUDENT IN AN ORGANIZED HEALTH CARE EDUCATION/TRAINING PROGRAM

## 2024-09-28 PROCEDURE — 1170000001 HC PRIVATE ONCOLOGY ROOM DAILY

## 2024-09-28 PROCEDURE — 85025 COMPLETE CBC W/AUTO DIFF WBC: CPT

## 2024-09-28 PROCEDURE — 36415 COLL VENOUS BLD VENIPUNCTURE: CPT

## 2024-09-28 PROCEDURE — 85610 PROTHROMBIN TIME: CPT

## 2024-09-28 PROCEDURE — 2500000002 HC RX 250 W HCPCS SELF ADMINISTERED DRUGS (ALT 637 FOR MEDICARE OP, ALT 636 FOR OP/ED)

## 2024-09-28 PROCEDURE — 80053 COMPREHEN METABOLIC PANEL: CPT

## 2024-09-28 PROCEDURE — 85045 AUTOMATED RETICULOCYTE COUNT: CPT

## 2024-09-28 RX ORDER — OXYCODONE HCL 10 MG/1
10 TABLET, FILM COATED, EXTENDED RELEASE ORAL EVERY 12 HOURS SCHEDULED
Status: ACTIVE | OUTPATIENT
Start: 2024-09-28

## 2024-09-28 RX ORDER — OXYCODONE HYDROCHLORIDE 10 MG/1
10 TABLET ORAL ONCE
Status: COMPLETED | OUTPATIENT
Start: 2024-09-28 | End: 2024-09-28

## 2024-09-28 ASSESSMENT — PAIN - FUNCTIONAL ASSESSMENT
PAIN_FUNCTIONAL_ASSESSMENT: 0-10

## 2024-09-28 ASSESSMENT — PAIN SCALES - GENERAL
PAINLEVEL_OUTOF10: 8
PAINLEVEL_OUTOF10: 9
PAINLEVEL_OUTOF10: 8
PAINLEVEL_OUTOF10: 8
PAINLEVEL_OUTOF10: 7

## 2024-09-28 ASSESSMENT — COGNITIVE AND FUNCTIONAL STATUS - GENERAL
TOILETING: A LOT
EATING MEALS: A LITTLE
CLIMB 3 TO 5 STEPS WITH RAILING: TOTAL
STANDING UP FROM CHAIR USING ARMS: A LOT
DAILY ACTIVITIY SCORE: 18
DAILY ACTIVITIY SCORE: 15
DRESSING REGULAR LOWER BODY CLOTHING: A LOT
PERSONAL GROOMING: A LITTLE
DRESSING REGULAR UPPER BODY CLOTHING: A LITTLE
MOVING TO AND FROM BED TO CHAIR: A LOT
CLIMB 3 TO 5 STEPS WITH RAILING: TOTAL
WALKING IN HOSPITAL ROOM: A LOT
MOBILITY SCORE: 14
MOVING TO AND FROM BED TO CHAIR: A LOT
STANDING UP FROM CHAIR USING ARMS: A LOT
TURNING FROM BACK TO SIDE WHILE IN FLAT BAD: A LITTLE
DRESSING REGULAR LOWER BODY CLOTHING: A LITTLE
TURNING FROM BACK TO SIDE WHILE IN FLAT BAD: A LITTLE
CLIMB 3 TO 5 STEPS WITH RAILING: A LOT
HELP NEEDED FOR BATHING: A LOT
WALKING IN HOSPITAL ROOM: A LOT
DRESSING REGULAR UPPER BODY CLOTHING: A LITTLE
TOILETING: A LOT
MOBILITY SCORE: 14
MOVING TO AND FROM BED TO CHAIR: A LOT
TURNING FROM BACK TO SIDE WHILE IN FLAT BAD: A LITTLE
MOBILITY SCORE: 15
STANDING UP FROM CHAIR USING ARMS: A LOT
HELP NEEDED FOR BATHING: A LOT
WALKING IN HOSPITAL ROOM: A LOT

## 2024-09-28 NOTE — SIGNIFICANT EVENT
.Rapid Response Nurse Note: [] Rapid Response [x] RADAR alert: Score 6  Pager time: [phone]  Arrival time:   Event end time:   Location: S4  [] Phone triage     Rapid response initiated by:  [] Rapid Response RN [] Family [] Nursing Supervisor [] Physician   [] RADAR auto-page [] Sepsis auto-page [] RN [] RT   [] NP/PA [] Other:     Primary reason for call:   [] BAT [] New CPAP/BiPAP [] Bleeding [] Change in mental status   [] Chest pain [] Code blue [] FiO2 >/= 50% [] HR </= 40 bpm   [] HR >/= 130 bpm [] Hyperglycemia [] Hypoglycemia [x] RADAR    [] RR </= 8 bpm [] RR >/= 30 bpm [] SBP </= 90 mmHg [] SpO2 < 90%   [] Seizure [] Sepsis [] Staff concern:     Initial VS and/or RADAR VS: T  °C; HR ; RR ; BP ; SPO2 %.  Providers present at bedside:   Objective/Subjective data relevant to event:   Interventions:  [x] None [] ABG [] Assist w/ICU transfer [] BAT paged    [] Bag mask [] Blood [] Cardioversion [] Code Blue   [] Code blue for intubation [] Code status changed [] Chest x-ray [] EKG   [] IV fluid/bolus [] KUB x-ray [] Labs/cultures [] Medication   [] Nebulizer treatment [] NIPPV (CPAP/BiPAP) [] Oxygen [] Oral airway   [] Peripheral IV [] Palliative care consult [] CT/MRI [] Sepsis protocol    [] Suctioned [] Other:     Name of ICU Provider contacted (if applicable):   Outcome:  [] Coded and  [] Code blue for intubation [] Coded and transferred to ICU []  on division   [] Remained on division (no change) [] Remained on division + additional monitoring [] Remained in ED [] Transferred to ED   [] Transferred to ICU [] Transferred to inpatient status [] Transferred for interventions (procedure) [] Transferred to ICU stepdown    [] Transferred to surgery [] Transferred to telemetry [] Sepsis protocol [] STEMI protocol   [] Stroke protocol [x] Bedside nurse instructed to page rapid response for any concerns or acute change in condition/VS     Additional Comments: Notified nurse of RADAR page.  No  concerns at this time per bedside nurse.  Encouraged to call with changes.

## 2024-09-28 NOTE — PROGRESS NOTES
Senait Narvaez is a 54 y.o. female on day 18 of admission presenting with Sickle cell disease with crisis and other complication.    Subjective   No Acute Events Overnight. Endorses feeling more nauseas today and had 1 episode of emesis. Non bloody. Denies any chest pain, cough, sob.    Objective   Visit Vitals  /77 (BP Location: Left arm, Patient Position: Lying)   Pulse 98   Temp 37.1 °C (98.8 °F) (Temporal)   Resp 20     Physical Exam  Vitals reviewed.   Constitutional:       Appearance: Normal appearance.   HENT:      Head: Normocephalic and atraumatic.      Nose: Nose normal.      Mouth/Throat:      Mouth: Mucous membranes are moist.      Pharynx: Oropharynx is clear.   Eyes:      Extraocular Movements: Extraocular movements intact.      Pupils: Pupils are equal, round, and reactive to light.   Cardiovascular:      Rate and Rhythm: Normal rate and regular rhythm.      Pulses: Normal pulses.      Heart sounds: Normal heart sounds.   Pulmonary:      Effort: Pulmonary effort is normal.      Breath sounds: Normal breath sounds.   Abdominal:      General: Bowel sounds are normal.      Palpations: Abdomen is soft.   Musculoskeletal:         General: Normal range of motion.   Skin:     General: Skin is warm.      Comments: On the bilateral breasts, flanks, umbilical area, there are black plaques with some desquamating scale, areas of erosions on the inferior aspect of the breasts as well as inner thighs ; ill defined erythema on the abdomen, breasts, and thighs    Neurological:      General: No focal deficit present.      Mental Status: She is alert and oriented to person, place, and time. Mental status is at baseline.   Psychiatric:         Mood and Affect: Mood normal.         Behavior: Behavior normal.     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 Patient was subsequently intubated due to worsening lethargic and lack of respiratory compensation from significant metabolic acidosis with concern for ACS. Vancomycin and Zosyn started for empiric coverage, patient also started on pressors for BP support. Patient was  transfused 2 units pRBCs and then underwent RBCEx on 9/11. Cardiology consulted for c/f NSTEMI vs demand ischemia (ST depressions and elevated troponin), rec treat as ACS. Course further c/b severe CHARLES with peak sCr of 5.4, and acute liver injury with severely elevated liver enzymes (ALT 4119, AST >10k). Liver US only remarkable for fatty infiltration, viral hepatitis panel negative. CHARLES and liver injury improved with supportive management. Pruritic rash noted 9/14, Derm consulted and took multiple biopsies 9/15. Rash then began to worsen to involve the groin, lateral thigh, and scalp with numerous tense bullae. Discontinued heparin given concerns for potential HIT, started bivalirudin. Patient was extubated on 9/16/2024. Started stress dose steroids given continued pressor requirements, pressors Dc'd 9/17. Patient transferred to [facility] 9/17, PT/OT rec SNF. Coumadin bridge was started 9/18. Given persistent rash with unremarkable labs, derm re-biopsied on 9/18, findings ultimately felt to be most c/w fixed drug eruption. Dermatology contacted for bx results (9/20) rec triamcinolone cream BID and interdry cloths. Leukocytosis uptrending 9/21, repeat infectious workup negative (CXR, Abd XR, blood cultures, MRSA negative 9/21). UA with leuks (9/21). On 9/25, per nursing and patient, rash seems to be worsening. Derm re-engaged recommended to  apply vasoline to all eroded/denuded areas and continue triamcinolone cream to both the black plaques as well as the red areas across the trunk and thighs. 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 Wound care following. For warfarin dosing, per pharmacy, if INR between 2-3 can restart home warfarin 5mg daily, if INR < 2 restart bival. INR 3.4 on 9/25, warfarin stopped, repeat INR 2.8 9/26 and warfarin restarted. On 9/26, increased PO oxycodone and discontinued IV dilaudid in anticipation for discharge. DC pending improved pain and improvement of skin.    Updates 9/28:  - INR 2.7  - Cr increased from 1.1 (9/27) -> 1.6 (9/28). Poor PO intake, emesis x1, and nausea. Encouraged increased PO intake and check serum creatinine tomorrow. C/w prn zofran  - wean oxygen as tolerated, maintain O2 sats 88-92%     #Purpuric rash, suspected fixed drug eruption  #Concern for possible heparin-induced thrombocytopenia (HIT) -resolved  -Purple, reticular pattern of plaques noted around the bilateral breasts, scalp and groin,  with tense bullae. Non-erythematous, no purulence  - Some initial concern for possible heparin-induced thrombocytopenia with concurrent skin findings. Discontinued heparin 9/16 and transitioned to bivalirudin  -4 T score 0, PF4 w/ reflex BINA negative. Hematology ultimately consulted, felt there was low concern for HIT and signed off  -Differential diagnosis includes purpura secondary to infection versus coagulopathy versus vasculopathy versus small and/or medium vessel vasculitis versus calciphylaxis vs drug rash  -Dermatology consulted, work-up to date:  - Low Protein C activity  - ANCA, PR3 and MPO negative  - ISABELLE neg  - cryoglobulin labs- not enough specimen to analyze  - S/p skin punch biopsies x 2 9/15 and one on 9/18, showed SUPERFICIAL EPIDERMAL DEGENERATION WITH ERYTHROCYTE EXTRAVASATION WITH NEUTROPHILS.  These findings could be seen secondary to an older trauma, or resolving erythema multiforme, a fixed drug eruption, Edgar-Christopher syndrome, or toxic epidermal necrolysis. Favor Multifocal Fixed Drug Eruption    - Dermatology contacted (9/20) about results, recommended adding vancomycin to allergy list and starting triamcinolone  - Continue triamcinolone 0.1% cream BID (9/20-current) per derm recs  - left breat suture removed 9/26 (per derm, sutures from flank were dissolvable)  - On 9/25, concern from pt and nursing that rash is not improving. Under breasts and groin still causing pain. Bilateral upper thighs and forehead rash improving   - Wound care re-consulted 9/25   - Re-engaged dermatology on 9/25 given worsening rash; recommended to apply vasoline to all eroded/denuded areas and continue triamcinolone cream to both the black plaques as well as the red areas across the trunk and thighs. Per dermatology, erythema is mild and not palpable, it is most likely part of the drug eruption the patient has previously been known to have     #Leukocytosis -Improving  - Likely I/s/o rash vs reactive vs infectious process  - leukocytosis noted on admission to MICU (WBC 24.8) --> 12.7 (9/25) --> 9.8   - leukocytosis improved throughout hospital stay however started uptrending 9/20; now improving on 9/25   - Blood cultures 9/10, 9/12, 9/15 all NGTD  - Strep and Legionella Ag, MRSA nares negative  - CXR (9/21) largely unremarkable, again re-demonstrated perihilar prominence seen on prior XR  - Abdominal xray (9/21) unremarkable  - Repeat blood cultures x 2 (9/21) NGTD  - UA (9/21) with 500 LE, no culture resulted.      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  # ACS  - Previously managed through routine RBC exchanges q6 weeks, most recent RBCEx 3/1/24, per patient pausing on transfusions for time being  - OARRS reviewed, no aberrant behavior noted  - LDH>12,000 (now downtrending), haptoglobin <30, fibrinogen 318, retic % 5.2 on  presentation, bili 10.7  - leukocytosis 24.8 on admission to MICU  - CXR (9/10) persistent atelectasis/scarring in the left lower lung  - CT CAP (9/11) Diffuse mosaic attenuation of the lung parenchyma, which can be seen in the setting of small airway disease, pulmonary edema or acute infection  - intubated upon arrival to MICU, now s/p extubation 9/16  - started on vanc and zosyn (finished both courses prior to floor transfer)  - Procal elevated 7.09 (9/11)  - Blood cultures 9/10, 9/12, 9/15 all NGTD  - Strep and Legionella Ag, MRSA nares negative  - trop peak 1431, cards rec treat as ACS (see below)  - [facility]  - S/p exchange transfusion 9/11 AM for ACS  - Care plan from 12/6/23- For mild to moderate pain: Dilaudid 0.5mg IVP q3h as needed, for moderate to severe pain Dilaudid 1- 1.5mg q3h PRN  - On arrival to [facility], started on 0.6mg dilaudid IVP q3h PRN severe pain   - s/p 0.6mg dilaudid IVP q4h PRN breakthrough and 15mg PO oxycodone Q4 hrs severe pain (9/25)  - Per discussion with Dr. Whaley (9/25), okay to increase PO oxycodone to 15-20mg Q4hr to optimize pain for discharge to SNF.   - On 9/26, discontinued IV dilaudid and increased to 20mg PO oxycodone q4hrs for severe pain, also added 15mg Oxycontin BID per discussion with Dr Whaley   - Bowel regimen for opioid induced constipation, zofran for opioid induced nausea, and benadrly for opioid induced pruritus  - c/w home Duloxetine 40mg daily, PO Zofran PRN, Folic Acid 1mg daily, and MVI daily     # ST depression with tropinemia, suspected 2/2 demand ischemia iso ACS  # Hx SVT  - Elevated troponins on admission, peak 1431 and now downtrending   - EKG with ST depressions, likely Type II MI due to vaso-occlusive crisis and anemia  - TTE 9/11: EF 60-65%, RV enlarged and reduced in function which appears stable when compared to last TTE (1/2024)  - Troponin leak is likely due to cardiac demand vs supply mismatch in the setting of worsening  anemia and vaso-occlusive crisis  - Cardiology consulted, now signed off with indication to treat troponemia as ACS  - Lasix held in MICU 2/2 hypotension and CHARLES, can resume as able   - Follow-up with Cardiology outpt, FUV 2/13/25     # CHARLES on CKD II  - Baseline SCr ~ 1.0-1.5, peak 5.4 this admission. 9/21 sCr 1.58 --> 1.10 (9/27)   - likely mixed pre-renal/intra-renal, on CKD II. FeNa 1.2%, consistent with ATN  - Cr increased from 1.1 (9/27) -> 1.6 (9/28). Poor PO intake, emesis x1, and nausea. Address with fluids and check serum creatinine tomorrow.  - Nephrology followed, now signed off, no need for HD  - encourage increased PO intake     # Acute liver injury, improving  # Direct hyperbilirubinuria, improving  - Suspected 2/2 hemodynamic instability versus sickle cell crisis  - On presentation to  MICU ALT 2611, AST 4727, T bili 10.2, INR 3.0; continues to improve  - CT with hepatic venous congestion, patient lacks gallbladder  - Liver US with Doppler: Diffuse fatty infiltration, unremarkable doppler interrogation of the liver  - EBV IgG positive, IgM negative  - CMV IgG positive, PCR negative  - Acute hepatitis panel unremarkable  - Hepatology initially following, now signed off  - Will continue to monitor, trend daily CMP     # pHTN   - Initial c/f CTEPH as imaging findings with dilated PA, filling defects, and mosaicism  - VQ scan (6/13/23) with non anatomical basal defects and RUL defect due to scar--> not favoring CTEPH per Dr. Yi and Dr. 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 On lifelong anticoagulation, DOAC discouraged due to high risk of clotting   - home Coumadin 5mg daily initially HELD on admission to MICU 2/2 unstable course  - restarted coumadin 9/18 PM, will plan to bridge with bival, Ok'd by heme   - 9/20 INR 3.8, bival and home warfarin held. Pharmacy rec repeat INR 4 hours after bival was held. If the INR < 2, we can restart the bival, but if the INR 2-3, we can continue with warfarin monotherapy   - 9/20 repeat INR 3.7, pharmacy rec continuing to hold both medications  - 9/21 INR 3.2, per pharmacy - get repeat INR in AM 9/22, if INR between 2-3 can restart home warfarin 5mg daily  -INR 2.6 (9/24) after restarting Warfarin 5mg, will maintain INR goal of 2-3  - INR 3.4 (9/25). Held warfarin on 9/25.   - Repeat INR 2.8 (9/26), restarted warfarin  - INR 9/27 2.5, 9/28 2.7. CTM daily  - Follows with Coumadin clinic outpatient  - Plan to consult vascular medicine if INR is not therapeutic on home regimen      # CAN  - cont home CPAP   - cont home Albuterol PRN     # H/O Cauda Equine syndrome  - Follows Dr. Delacruz as outpatient  - no current issues      # DISPO  - Full code- confirmed on admission  - NOK: Tati Salgado (mother) (#[phone])  - PT/OT rec SNF, pending choice/placement  - FUV PCP 10/1, Anim 10/7, Cardiology 2/13       I spent >60 minutes in the professional and overall care of this patient.      Dariana Parrish MD

## 2024-09-28 NOTE — SIGNIFICANT EVENT
Rapid Response Nurse Note: [] Rapid Response [x] RADAR alert: Score 7  Pager time:   Arrival time:   Event end time:   Location: [facility] 4024  [x] Phone triage     Rapid response initiated by:  [] Rapid Response RN [] Family [] Nursing Supervisor [] Physician   [x] RADAR auto-page [] Sepsis auto-page [] RN [] RT   [] NP/PA [] Other:     Primary reason for call:   [] BAT [] New CPAP/BiPAP [] Bleeding [] Change in mental status   [] Chest pain [] Code blue [] FiO2 >/= 50% [] HR </= 40 bpm   [] HR >/= 130 bpm [] Hyperglycemia [] Hypoglycemia [x] RADAR    [] RR </= 8 bpm [] RR >/= 30 bpm [] SBP </= 90 mmHg [] SpO2 < 90%   [] Seizure [] Sepsis [] Staff concern:     Initial VS and/or RADAR VS: T 37.4 °C; HR 95; RR 20; BP 91/52; SPO2 92%.  Providers present at bedside:   Objective/Subjective data relevant to event:     Interventions:  [x] None [] ABG [] Assist w/ICU transfer [] BAT paged    [] Bag mask [] Blood [] Cardioversion [] Code Blue   [] Code blue for intubation [] Code status changed [] Chest x-ray [] EKG   [] IV fluid/bolus [] KUB x-ray [] Labs/cultures [] Medication   [] Nebulizer treatment [] NIPPV (CPAP/BiPAP) [] Oxygen [] Oral airway   [] Peripheral IV [] Palliative care consult [] CT/MRI [] Sepsis protocol    [] Suctioned [] Other:     Name of ICU Provider contacted (if applicable):   Outcome:  [] Coded and  [] Code blue for intubation [] Coded and transferred to ICU []  on division   [x] Remained on division (no change) [] Remained on division + additional monitoring [] Remained in ED [] Transferred to ED   [] Transferred to ICU [] Transferred to inpatient status [] Transferred for interventions (procedure) [] Transferred to ICU stepdown    [] Transferred to surgery [] Transferred to telemetry [] Sepsis protocol [] STEMI protocol   [] Stroke protocol [x] Bedside nurse instructed to page rapid response for any concerns or acute change in condition/VS     Additional Comments:     Dillon  auto-page received for a radar score of 7 with the above listed vital signs. Vital signs were confirmed and reviewed with primary RN. She is at her current baseline and RN has no concerns. There are no indications for interventions by Rapid Response at this time. RN to contact Rapid Response with any future concerns or signs of clinical decompensation.

## 2024-09-28 NOTE — CARE PLAN
The patient's goals for the shift include      The clinical goals for the shift include pt will remain HDS and VSS throughout shift end on 9/28/24 @1900      Problem: Pain - Adult  Goal: Verbalizes/displays adequate comfort level or baseline comfort level  Outcome: Progressing     Problem: Safety - Adult  Goal: Free from fall injury  Outcome: Progressing     Problem: Skin  Goal: Decreased wound size/increased tissue granulation at next dressing change  Outcome: Progressing  Flowsheets (Taken 9/28/2024 1759)  Decreased wound size/increased tissue granulation at next dressing change: Promote sleep for wound healing  Goal: Participates in plan/prevention/treatment measures  Outcome: Progressing  Flowsheets (Taken 9/28/2024 1759)  Participates in plan/prevention/treatment measures: Increase activity/out of bed for meals  Goal: Prevent/manage excess moisture  Outcome: Progressing  Flowsheets (Taken 9/28/2024 1759)  Prevent/manage excess moisture: Follow provider orders for dressing changes  Goal: Prevent/minimize sheer/friction injuries  Outcome: Progressing  Flowsheets (Taken 9/28/2024 1759)  Prevent/minimize sheer/friction injuries: Use pull sheet  Goal: Promote/optimize nutrition  Outcome: Progressing  Flowsheets (Taken 9/28/2024 1759)  Promote/optimize nutrition:   Assist with feeding   Offer water/supplements/favorite foods  Goal: Promote skin healing  Outcome: Progressing  Flowsheets (Taken 9/28/2024 1759)  Promote skin healing: Assess skin/pad under line(s)/device(s)     Problem: Knowledge Deficit  Goal: Patient/family/caregiver demonstrates understanding of disease process, treatment plan, medications, and discharge instructions  Outcome: Progressing     Problem: Mechanical Ventilation  Goal: Patient Will Maintain Patent Airway  Outcome: Progressing  Goal: Oral health is maintained or improved  Outcome: Progressing  Goal: Tracheostomy will be managed safely  Outcome: Progressing  Goal: ET tube will be managed  safely  Outcome: Progressing  Goal: Ability to express needs and understand communication  Outcome: Progressing  Goal: Mobility/activity is maintained at optimum level for patient  Outcome: Progressing     Problem: Pain  Goal: Takes deep breaths with improved pain control throughout the shift  Outcome: Progressing  Goal: Turns in bed with improved pain control throughout the shift  Outcome: Progressing  Goal: Walks with improved pain control throughout the shift  Outcome: Progressing  Goal: Performs ADL's with improved pain control throughout shift  Outcome: Progressing  Goal: Participates in PT with improved pain control throughout the shift  Outcome: Progressing  Goal: Free from opioid side effects throughout the shift  Outcome: Progressing  Goal: Free from acute confusion related to pain meds throughout the shift  Outcome: Progressing     Problem: Nutrition  Goal: Less than 5 days NPO/clear liquids  Outcome: Progressing  Goal: Oral intake greater than 50%  Outcome: Progressing  Goal: Oral intake greater 75%  Outcome: Progressing  Goal: Consume prescribed supplement  Outcome: Progressing  Goal: Adequate PO fluid intake  Outcome: Progressing  Goal: Nutrition support goals are met within 48 hrs  Outcome: Progressing  Goal: Nutrition support is meeting 75% of nutrient needs  Outcome: Progressing  Goal: Tube feed tolerance  Outcome: Progressing  Goal: BG  mg/dL  Outcome: Progressing  Goal: Lab values WNL  Outcome: Progressing  Goal: Electrolytes WNL  Outcome: Progressing  Goal: Promote healing  Outcome: Progressing  Goal: Maintain stable weight  Outcome: Progressing  Goal: Reduce weight from edema/fluid  Outcome: Progressing  Goal: Gradual weight gain  Outcome: Progressing  Goal: Improve ostomy output  Outcome: Progressing     Problem: Fall/Injury  Goal: Not fall by end of shift  Outcome: Progressing  Goal: Be free from injury by end of the shift  Outcome: Progressing  Goal: Verbalize understanding of personal  risk factors for fall in the hospital  Outcome: Progressing  Goal: Verbalize understanding of risk factor reduction measures to prevent injury from fall in the home  Outcome: Progressing  Goal: Use assistive devices by end of the shift  Outcome: Progressing  Goal: Pace activities to prevent fatigue by end of the shift  Outcome: Progressing

## 2024-09-29 VITALS
WEIGHT: 264.77 LBS | SYSTOLIC BLOOD PRESSURE: 112 MMHG | HEART RATE: 94 BPM | DIASTOLIC BLOOD PRESSURE: 64 MMHG | BODY MASS INDEX: 44.11 KG/M2 | TEMPERATURE: 97.7 F | OXYGEN SATURATION: 93 % | RESPIRATION RATE: 14 BRPM | HEIGHT: 65 IN

## 2024-09-29 LAB
ALBUMIN SERPL BCP-MCNC: 2.6 G/DL (ref 3.4–5)
ALBUMIN SERPL BCP-MCNC: 2.8 G/DL (ref 3.4–5)
ALP SERPL-CCNC: 171 U/L (ref 33–110)
ALT SERPL W P-5'-P-CCNC: 44 U/L (ref 7–45)
ANION GAP BLDA CALCULATED.4IONS-SCNC: 7 MMO/L (ref 10–25)
ANION GAP BLDV CALCULATED.4IONS-SCNC: 7 MMOL/L (ref 10–25)
ANION GAP SERPL CALC-SCNC: 13 MMOL/L (ref 10–20)
ANION GAP SERPL CALC-SCNC: 17 MMOL/L (ref 10–20)
APPEARANCE UR: ABNORMAL
AST SERPL W P-5'-P-CCNC: 52 U/L (ref 9–39)
BACTERIA #/AREA URNS AUTO: ABNORMAL /HPF
BACTERIA BLD CULT: NORMAL
BACTERIA BLD CULT: NORMAL
BASE EXCESS BLDA CALC-SCNC: 1.1 MMOL/L (ref -2–3)
BASE EXCESS BLDV CALC-SCNC: 1.1 MMOL/L (ref -2–3)
BASO STIPL BLD QL SMEAR: PRESENT
BASOPHILS # BLD MANUAL: 0 X10*3/UL (ref 0–0.1)
BASOPHILS NFR BLD MANUAL: 0 %
BILIRUB SERPL-MCNC: 2.5 MG/DL (ref 0–1.2)
BILIRUB UR STRIP.AUTO-MCNC: NEGATIVE MG/DL
BODY TEMPERATURE: 37 DEGREES CELSIUS
BODY TEMPERATURE: 37 DEGREES CELSIUS
BUN SERPL-MCNC: 28 MG/DL (ref 6–23)
BUN SERPL-MCNC: 29 MG/DL (ref 6–23)
CA-I BLDA-SCNC: 1.19 MMOL/L (ref 1.1–1.33)
CA-I BLDV-SCNC: 1.15 MMOL/L (ref 1.1–1.33)
CALCIUM SERPL-MCNC: 8.1 MG/DL (ref 8.6–10.6)
CALCIUM SERPL-MCNC: 8.3 MG/DL (ref 8.6–10.6)
CHLORIDE BLDA-SCNC: 100 MMOL/L (ref 98–107)
CHLORIDE BLDV-SCNC: 101 MMOL/L (ref 98–107)
CHLORIDE SERPL-SCNC: 99 MMOL/L (ref 98–107)
CHLORIDE SERPL-SCNC: 99 MMOL/L (ref 98–107)
CHLORIDE UR-SCNC: 18 MMOL/L
CHLORIDE/CREATININE (MMOL/G) IN URINE: 14 MMOL/G CREAT (ref 38–318)
CO2 SERPL-SCNC: 24 MMOL/L (ref 21–32)
CO2 SERPL-SCNC: 29 MMOL/L (ref 21–32)
COLOR UR: YELLOW
CREAT SERPL-MCNC: 2.88 MG/DL (ref 0.5–1.05)
CREAT SERPL-MCNC: 2.93 MG/DL (ref 0.5–1.05)
CREAT UR-MCNC: 127.8 MG/DL (ref 20–320)
EGFRCR SERPLBLD CKD-EPI 2021: 18 ML/MIN/1.73M*2
EGFRCR SERPLBLD CKD-EPI 2021: 19 ML/MIN/1.73M*2
EOSINOPHIL # BLD MANUAL: 0 X10*3/UL (ref 0–0.7)
EOSINOPHIL NFR BLD MANUAL: 0 %
ERYTHROCYTE [DISTWIDTH] IN BLOOD BY AUTOMATED COUNT: 22.1 % (ref 11.5–14.5)
ERYTHROCYTE [DISTWIDTH] IN BLOOD BY AUTOMATED COUNT: 23.4 % (ref 11.5–14.5)
GLUCOSE BLD MANUAL STRIP-MCNC: 84 MG/DL (ref 74–99)
GLUCOSE BLD MANUAL STRIP-MCNC: 88 MG/DL (ref 74–99)
GLUCOSE BLDA-MCNC: 83 MG/DL (ref 74–99)
GLUCOSE BLDV-MCNC: 74 MG/DL (ref 74–99)
GLUCOSE SERPL-MCNC: 64 MG/DL (ref 74–99)
GLUCOSE SERPL-MCNC: 73 MG/DL (ref 74–99)
GLUCOSE UR STRIP.AUTO-MCNC: NORMAL MG/DL
HCO3 BLDA-SCNC: 28.9 MMOL/L (ref 22–26)
HCO3 BLDV-SCNC: 29.9 MMOL/L (ref 22–26)
HCT VFR BLD AUTO: 25.1 % (ref 36–46)
HCT VFR BLD AUTO: 25.1 % (ref 36–46)
HCT VFR BLD EST: 29 % (ref 36–46)
HCT VFR BLD EST: 30 % (ref 36–46)
HGB BLD-MCNC: 7.7 G/DL (ref 12–16)
HGB BLD-MCNC: 8.1 G/DL (ref 12–16)
HGB BLDA-MCNC: 9.9 G/DL (ref 12–16)
HGB BLDV-MCNC: 9.5 G/DL (ref 12–16)
HGB RETIC QN: 28 PG (ref 28–38)
IMM GRANULOCYTES # BLD AUTO: 0.08 X10*3/UL (ref 0–0.7)
IMM GRANULOCYTES NFR BLD AUTO: 0.7 % (ref 0–0.9)
IMMATURE RETIC FRACTION: 10.7 %
INHALED O2 CONCENTRATION: 36 %
INHALED O2 CONCENTRATION: 36 %
INR PPP: 4 (ref 0.9–1.1)
KETONES UR STRIP.AUTO-MCNC: NEGATIVE MG/DL
LACTATE BLDA-SCNC: 0.8 MMOL/L (ref 0.4–2)
LACTATE BLDV-SCNC: 1.3 MMOL/L (ref 0.4–2)
LDH SERPL L TO P-CCNC: 337 U/L (ref 84–246)
LEUKOCYTE ESTERASE UR QL STRIP.AUTO: ABNORMAL
LYMPHOCYTES # BLD MANUAL: 0.97 X10*3/UL (ref 1.2–4.8)
LYMPHOCYTES NFR BLD MANUAL: 8.2 %
MCH RBC QN AUTO: 30 PG (ref 26–34)
MCH RBC QN AUTO: 30.4 PG (ref 26–34)
MCHC RBC AUTO-ENTMCNC: 30.7 G/DL (ref 32–36)
MCHC RBC AUTO-ENTMCNC: 32.3 G/DL (ref 32–36)
MCV RBC AUTO: 93 FL (ref 80–100)
MCV RBC AUTO: 99 FL (ref 80–100)
MONOCYTES # BLD MANUAL: 0.67 X10*3/UL (ref 0.1–1)
MONOCYTES NFR BLD MANUAL: 5.7 %
MUCOUS THREADS #/AREA URNS AUTO: ABNORMAL /LPF
NEUTS SEG # BLD MANUAL: 10.16 X10*3/UL (ref 1.2–7)
NEUTS SEG NFR BLD MANUAL: 86.1 %
NITRITE UR QL STRIP.AUTO: NEGATIVE
NRBC BLD-RTO: 7.1 /100 WBCS (ref 0–0)
NRBC BLD-RTO: 7.7 /100 WBCS (ref 0–0)
OXYHGB MFR BLDA: 94.6 % (ref 94–98)
OXYHGB MFR BLDV: 50.9 % (ref 45–75)
PAPPENHEIMER BOD BLD QL SMEAR: PRESENT
PCO2 BLDA: 63 MM HG (ref 38–42)
PCO2 BLDV: 73 MM HG (ref 41–51)
PH BLDA: 7.27 PH (ref 7.38–7.42)
PH BLDV: 7.22 PH (ref 7.33–7.43)
PH UR STRIP.AUTO: 5.5 [PH]
PHOSPHATE SERPL-MCNC: 5.6 MG/DL (ref 2.5–4.9)
PLATELET # BLD AUTO: 237 X10*3/UL (ref 150–450)
PLATELET # BLD AUTO: 276 X10*3/UL (ref 150–450)
PO2 BLDA: 85 MM HG (ref 85–95)
PO2 BLDV: 36 MM HG (ref 35–45)
POLYCHROMASIA BLD QL SMEAR: ABNORMAL
POTASSIUM BLDA-SCNC: 5.7 MMOL/L (ref 3.5–5.3)
POTASSIUM BLDV-SCNC: 6.2 MMOL/L (ref 3.5–5.3)
POTASSIUM SERPL-SCNC: 5.5 MMOL/L (ref 3.5–5.3)
POTASSIUM SERPL-SCNC: 5.9 MMOL/L (ref 3.5–5.3)
POTASSIUM UR-SCNC: 33 MMOL/L
POTASSIUM/CREAT UR-RTO: 26 MMOL/G CREAT
PROT SERPL-MCNC: 6.2 G/DL (ref 6.4–8.2)
PROT UR STRIP.AUTO-MCNC: ABNORMAL MG/DL
PROTHROMBIN TIME: 46 SECONDS (ref 9.8–12.8)
RBC # BLD AUTO: 2.53 X10*6/UL (ref 4–5.2)
RBC # BLD AUTO: 2.7 X10*6/UL (ref 4–5.2)
RBC # UR STRIP.AUTO: ABNORMAL /UL
RBC #/AREA URNS AUTO: >20 /HPF
RBC MORPH BLD: ABNORMAL
RETICS #: 0.31 X10*6/UL (ref 0.02–0.08)
RETICS/RBC NFR AUTO: 12.4 % (ref 0.5–2)
SAO2 % BLDA: 98 % (ref 94–100)
SAO2 % BLDV: 52 % (ref 45–75)
SODIUM BLDA-SCNC: 130 MMOL/L (ref 136–145)
SODIUM BLDV-SCNC: 132 MMOL/L (ref 136–145)
SODIUM SERPL-SCNC: 134 MMOL/L (ref 136–145)
SODIUM SERPL-SCNC: 135 MMOL/L (ref 136–145)
SODIUM UR-SCNC: 22 MMOL/L
SODIUM/CREAT UR-RTO: 17 MMOL/G CREAT
SP GR UR STRIP.AUTO: 1.01
SQUAMOUS #/AREA URNS AUTO: ABNORMAL /HPF
TARGETS BLD QL SMEAR: ABNORMAL
TOTAL CELLS COUNTED BLD: 122
UROBILINOGEN UR STRIP.AUTO-MCNC: ABNORMAL MG/DL
WBC # BLD AUTO: 11.8 X10*3/UL (ref 4.4–11.3)
WBC # BLD AUTO: 12.3 X10*3/UL (ref 4.4–11.3)
WBC #/AREA URNS AUTO: ABNORMAL /HPF

## 2024-09-29 PROCEDURE — 82435 ASSAY OF BLOOD CHLORIDE: CPT | Performed by: NURSE PRACTITIONER

## 2024-09-29 PROCEDURE — 36415 COLL VENOUS BLD VENIPUNCTURE: CPT | Performed by: NURSE PRACTITIONER

## 2024-09-29 PROCEDURE — 82435 ASSAY OF BLOOD CHLORIDE: CPT

## 2024-09-29 PROCEDURE — 85045 AUTOMATED RETICULOCYTE COUNT: CPT

## 2024-09-29 PROCEDURE — 84132 ASSAY OF SERUM POTASSIUM: CPT

## 2024-09-29 PROCEDURE — 36415 COLL VENOUS BLD VENIPUNCTURE: CPT

## 2024-09-29 PROCEDURE — 2500000001 HC RX 250 WO HCPCS SELF ADMINISTERED DRUGS (ALT 637 FOR MEDICARE OP)

## 2024-09-29 PROCEDURE — 87075 CULTR BACTERIA EXCEPT BLOOD: CPT | Performed by: NURSE PRACTITIONER

## 2024-09-29 PROCEDURE — 85027 COMPLETE CBC AUTOMATED: CPT

## 2024-09-29 PROCEDURE — 81001 URINALYSIS AUTO W/SCOPE: CPT | Performed by: NURSE PRACTITIONER

## 2024-09-29 PROCEDURE — 2500000002 HC RX 250 W HCPCS SELF ADMINISTERED DRUGS (ALT 637 FOR MEDICARE OP, ALT 636 FOR OP/ED)

## 2024-09-29 PROCEDURE — 82330 ASSAY OF CALCIUM: CPT

## 2024-09-29 PROCEDURE — 99233 SBSQ HOSP IP/OBS HIGH 50: CPT | Performed by: NURSE PRACTITIONER

## 2024-09-29 PROCEDURE — 80051 ELECTROLYTE PANEL: CPT

## 2024-09-29 PROCEDURE — 82436 ASSAY OF URINE CHLORIDE: CPT | Performed by: NURSE PRACTITIONER

## 2024-09-29 PROCEDURE — 94660 CPAP INITIATION&MGMT: CPT

## 2024-09-29 PROCEDURE — 85027 COMPLETE CBC AUTOMATED: CPT | Performed by: NURSE PRACTITIONER

## 2024-09-29 PROCEDURE — 85610 PROTHROMBIN TIME: CPT | Performed by: NURSE PRACTITIONER

## 2024-09-29 PROCEDURE — 82947 ASSAY GLUCOSE BLOOD QUANT: CPT

## 2024-09-29 PROCEDURE — 84132 ASSAY OF SERUM POTASSIUM: CPT | Performed by: NURSE PRACTITIONER

## 2024-09-29 PROCEDURE — 1170000001 HC PRIVATE ONCOLOGY ROOM DAILY

## 2024-09-29 PROCEDURE — 87040 BLOOD CULTURE FOR BACTERIA: CPT | Performed by: NURSE PRACTITIONER

## 2024-09-29 PROCEDURE — 83615 LACTATE (LD) (LDH) ENZYME: CPT

## 2024-09-29 PROCEDURE — 85007 BL SMEAR W/DIFF WBC COUNT: CPT

## 2024-09-29 PROCEDURE — 2500000004 HC RX 250 GENERAL PHARMACY W/ HCPCS (ALT 636 FOR OP/ED)

## 2024-09-29 PROCEDURE — 2500000005 HC RX 250 GENERAL PHARMACY W/O HCPCS

## 2024-09-29 PROCEDURE — 87086 URINE CULTURE/COLONY COUNT: CPT | Performed by: NURSE PRACTITIONER

## 2024-09-29 PROCEDURE — 36600 WITHDRAWAL OF ARTERIAL BLOOD: CPT

## 2024-09-29 PROCEDURE — 99232 SBSQ HOSP IP/OBS MODERATE 35: CPT

## 2024-09-29 PROCEDURE — 2500000005 HC RX 250 GENERAL PHARMACY W/O HCPCS: Performed by: NURSE PRACTITIONER

## 2024-09-29 PROCEDURE — 2500000004 HC RX 250 GENERAL PHARMACY W/ HCPCS (ALT 636 FOR OP/ED): Performed by: NURSE PRACTITIONER

## 2024-09-29 RX ORDER — NALOXONE HYDROCHLORIDE 0.4 MG/ML
0.2 INJECTION, SOLUTION INTRAMUSCULAR; INTRAVENOUS; SUBCUTANEOUS EVERY 5 MIN PRN
Status: DISCONTINUED | OUTPATIENT
Start: 2024-09-29 | End: 2024-09-29

## 2024-09-29 RX ORDER — NALOXONE HYDROCHLORIDE 0.4 MG/ML
INJECTION, SOLUTION INTRAMUSCULAR; INTRAVENOUS; SUBCUTANEOUS
Status: COMPLETED
Start: 2024-09-29 | End: 2024-09-29

## 2024-09-29 RX ORDER — NALOXONE HYDROCHLORIDE 0.4 MG/ML
0.2 INJECTION, SOLUTION INTRAMUSCULAR; INTRAVENOUS; SUBCUTANEOUS ONCE AS NEEDED
Status: COMPLETED | OUTPATIENT
Start: 2024-09-29 | End: 2024-09-29

## 2024-09-29 RX ORDER — NALOXONE HYDROCHLORIDE 0.4 MG/ML
0.4 INJECTION, SOLUTION INTRAMUSCULAR; INTRAVENOUS; SUBCUTANEOUS EVERY 5 MIN PRN
Status: ACTIVE | OUTPATIENT
Start: 2024-09-29

## 2024-09-29 ASSESSMENT — COGNITIVE AND FUNCTIONAL STATUS - GENERAL
TOILETING: A LOT
EATING MEALS: A LITTLE
DRESSING REGULAR LOWER BODY CLOTHING: A LOT
HELP NEEDED FOR BATHING: A LOT
MOBILITY SCORE: 13
CLIMB 3 TO 5 STEPS WITH RAILING: TOTAL
WALKING IN HOSPITAL ROOM: A LOT
MOVING TO AND FROM BED TO CHAIR: A LOT
PERSONAL GROOMING: A LITTLE
STANDING UP FROM CHAIR USING ARMS: A LOT
MOVING FROM LYING ON BACK TO SITTING ON SIDE OF FLAT BED WITH BEDRAILS: A LITTLE
TURNING FROM BACK TO SIDE WHILE IN FLAT BAD: A LITTLE
DRESSING REGULAR UPPER BODY CLOTHING: A LITTLE
DAILY ACTIVITIY SCORE: 15

## 2024-09-29 ASSESSMENT — PAIN SCALES - GENERAL: PAINLEVEL_OUTOF10: 0 - NO PAIN

## 2024-09-29 NOTE — SIGNIFICANT EVENT
CANCELLED BRAIN ATTACK NOTE  Brain Attack called during a Rapid Response at 0232 out of concern for AMS. Stroke team arrived at 0233. LKW was determined to be approximately an hour prior. BP was 93/68; BG was 84. Rapid neurologic evaluation revealed eyes half-open staring ahead, pupils 2->1 symmetric, intermittently following vocal commands but not speaking, BLE drift. NIHSS was thus 11 (breakdown below) but nonfocal. Exam was non-localizable on the neuraxis (including no evidence of top-of-the-basilar nor Artery of Percheron dysfunction given intermittent verbal responses and command following). NACR and Rapid Team suspected opioid intoxication and/or acute hypercapnic respiratory failure. VBG was obtained and Narcan administered. Patient's NIHSS improved to 2.    The patient was not a candidate for IV thrombolysis given low concern for stroke as the primary diagnosis. Likewise not a candidate for endovascular mechanical thrombectomy given no evidence for LVO. Given no role for acute neurologic intervention, the Brain Attack protocol was cancelled. Clinical reasoning explained to the primary team, who agree with this plan.    Luis Contreras MD   Neurology Resident PGY-4  Brain Attack Team (BAT)      Initial NIHSS:  1a  Level of consciousness: 1=not alert but arousable by minor stimulation to obey, answer or respond   1b. LOC questions:  2=Performs neither task correctly   1c. LOC commands: 2=Performs neither task correctly   2.  Best Gaze: 0=normal   3. Visual: 0=No visual loss   4. Facial Palsy: 0=Normal symmetric movement   5a. Motor Left Arm: 0=No drift, limb holds 90 (or 45) degrees for full 10 seconds   5b.  Motor Right Arm: 0=No drift, limb holds 90 (or 45) degrees for full 10 seconds   6a. Motor Left Le=Drift, limb holds 90 (or 45) degrees but drifts down before full 10 seconds: does not hit bed   6b  Motor Right Le=Drift, limb holds 90 (or 45) degrees but drifts down before full 10 seconds: does  not hit bed   7. Limb Ataxia: 0=Absent   8.  Sensory: 0=Normal; no sensory loss   9. Best Language:  2=Severe Aphasia: fragmentary expression, inference needed, cannot identify materials   10. Dysarthria: 2=Severe; patient speech is so slurred as to be unintelligible in the absence of or our of proportion to any dysphagia, or is mute/anarthric   11. Extinction and Inattention: 0=No abnormality          Total:   11       Final NIHSS:  1a  Level of consciousness: 0=alert; keenly responsive   1b. LOC questions:  0=Performs both tasks correctly   1c. LOC commands: 0=Performs both tasks correctly   2.  Best Gaze: 0=normal   3. Visual: 0=No visual loss   4. Facial Palsy: 0=Normal symmetric movement   5a. Motor Left Arm: 0=No drift, limb holds 90 (or 45) degrees for full 10 seconds   5b.  Motor Right Arm: 0=No drift, limb holds 90 (or 45) degrees for full 10 seconds   6a. Motor Left Le=Drift, limb holds 90 (or 45) degrees but drifts down before full 10 seconds: does not hit bed   6b  Motor Right Le=Drift, limb holds 90 (or 45) degrees but drifts down before full 10 seconds: does not hit bed   7. Limb Ataxia: 0=Absent   8.  Sensory: 0=Normal; no sensory loss   9. Best Language:  0=No aphasia, normal   10. Dysarthria: 0=Normal   11. Extinction and Inattention: 0=No abnormality          Total:   2

## 2024-09-29 NOTE — NURSING NOTE
Rapid Response Nurse Note: [x] Rapid Response  Pager time: 226  Arrival time: 228  Event end time: 249  Location: [facility] 4024  [] Phone triage     Rapid response initiated by:  [] Rapid Response RN [] Family [] Nursing Supervisor [] Physician   [] RADAR auto-page [] Sepsis auto-page [x] RN [] RT   [] NP/PA [] Other:     Primary reason for call:   [] BAT [] New CPAP/BiPAP [] Bleeding [x] Change in mental status   [] Chest pain [] Code blue [] FiO2 >/= 50% [] HR </= 40 bpm   [] HR >/= 130 bpm [] Hyperglycemia [] Hypoglycemia [] RADAR    [] RR </= 8 bpm [] RR >/= 30 bpm [] SBP </= 90 mmHg [] SpO2 < 90%   [] Seizure [] Sepsis [] Staff concern:     Vitals:    24 2219 24 0235 24 0248 24 0300   BP: 92/61 93/68 92/61    BP Location: Left arm      Patient Position: Lying      Pulse: 96 96 95    Resp: 20 20 14 13   Temp: 37.3 °C (99.1 °F) 36.9 °C (98.4 °F) 37.1 °C (98.8 °F)    TempSrc: Temporal Temporal Temporal    SpO2: 95% 100% 99%    Weight:       Height:            Providers present at bedside: Kiersten CASPER (Britt), Torres (TRINO)  Objective/Subjective data relevant to event: Pt with altered mental status  Interventions:  [] None [] ABG [] Assist w/ICU transfer [] BAT paged    [] Bag mask [] Blood [] Cardioversion [] Code Blue   [] Code blue for intubation [] Code status changed [] Chest x-ray [] EKG   [] IV fluid/bolus [] KUB x-ray [] Labs/cultures [] Medication   [] Nebulizer treatment [] NIPPV (CPAP/BiPAP) [] Oxygen [] Oral airway   [] Peripheral IV [] Palliative care consult [] CT/MRI [] Sepsis protocol    [x] Suctioned [x] Other: Narcan. VBG, labs, BAT paged       Outcome:  [] Coded and  [] Code blue for intubation [] Coded and transferred to ICU []  on division   [x] Remained on division (no change) [] Remained on division + additional monitoring [] Remained in ED [] Transferred to ED   [] Transferred to ICU [] Transferred to inpatient status [] Transferred for interventions  (procedure) [] Transferred to ICU stepdown    [] Transferred to surgery [] Transferred to telemetry [] Sepsis protocol [] STEMI protocol   [] Stroke protocol [x] Bedside nurse instructed to page rapid response for any concerns or acute change in condition/VS     Additional Comments: BAT paged due to altered mental status.  Dr Contreras (stroke team) came to evaluate pt.  No focal deficits noted.  BAT cancelled.  0.4 mg Narcan given with improvement in mental status.  Pt awake and conversing.  Placed on CPAP HS.   Oral care provided.

## 2024-09-29 NOTE — PROGRESS NOTES
"Senait Narvaez is a 54 y.o. female on day 19 of admission presenting with Sickle cell disease with crisis and other complication.    Subjective   When awake, answering yes/no questions. Answers 'yes' when questioned if her breathing feels normal. Otherwise somnolent and unable to answer questions.       Objective     Physical Exam  Constitutional:       Appearance: She is obese.   HENT:      Head: Normocephalic and atraumatic.      Mouth/Throat:      Mouth: Mucous membranes are moist.   Eyes:      Extraocular Movements: Extraocular movements intact.   Cardiovascular:      Rate and Rhythm: Normal rate and regular rhythm.      Pulses: Normal pulses.      Heart sounds: Normal heart sounds.   Pulmonary:      Breath sounds: Normal breath sounds.   Abdominal:      General: Bowel sounds are normal.      Palpations: Abdomen is soft.   Musculoskeletal:         General: Normal range of motion.      Cervical back: Normal range of motion.   Skin:     General: Skin is warm and dry.   Neurological:      Comments: Somnolent at time of assessment, not following commands or registering voices. After Narcan, MS improved. Able to speak her name and that of her nurses. Oriented x 3. Following commands. MCKEON. No weakness in extremities.         Last Recorded Vitals  Blood pressure 92/61, pulse 95, temperature 37.1 °C (98.8 °F), temperature source Temporal, resp. rate 13, height 1.651 m (5' 5\"), weight 120 kg (264 lb 12.4 oz), SpO2 99%.  Intake/Output last 3 Shifts:  No intake/output data recorded.    Relevant Results               Results for orders placed or performed during the hospital encounter of 09/10/24 (from the past 24 hour(s))   POCT GLUCOSE   Result Value Ref Range    POCT Glucose 84 74 - 99 mg/dL   Lactate dehydrogenase   Result Value Ref Range     (H) 84 - 246 U/L   Reticulocytes   Result Value Ref Range    Retic % 12.4 (H) 0.5 - 2.0 %    Retic Absolute 0.313 (H) 0.018 - 0.083 x10*6/uL    Reticulocyte Hemoglobin 28 28 - " 38 pg    Immature Retic fraction 10.7 <=16.0 %   CBC and Auto Differential   Result Value Ref Range    WBC 11.8 (H) 4.4 - 11.3 x10*3/uL    nRBC 7.1 (H) 0.0 - 0.0 /100 WBCs    RBC 2.53 (L) 4.00 - 5.20 x10*6/uL    Hemoglobin 7.7 (L) 12.0 - 16.0 g/dL    Hematocrit 25.1 (L) 36.0 - 46.0 %    MCV 99 80 - 100 fL    MCH 30.4 26.0 - 34.0 pg    MCHC 30.7 (L) 32.0 - 36.0 g/dL    RDW 23.4 (H) 11.5 - 14.5 %    Platelets 237 150 - 450 x10*3/uL    Immature Granulocytes %, Automated 0.7 0.0 - 0.9 %    Immature Granulocytes Absolute, Automated 0.08 0.00 - 0.70 x10*3/uL   Comprehensive metabolic panel   Result Value Ref Range    Glucose 64 (L) 74 - 99 mg/dL    Sodium 134 (L) 136 - 145 mmol/L    Potassium 5.9 (H) 3.5 - 5.3 mmol/L    Chloride 99 98 - 107 mmol/L    Bicarbonate 24 21 - 32 mmol/L    Anion Gap 17 10 - 20 mmol/L    Urea Nitrogen 28 (H) 6 - 23 mg/dL    Creatinine 2.93 (H) 0.50 - 1.05 mg/dL    eGFR 18 (L) >60 mL/min/1.73m*2    Calcium 8.1 (L) 8.6 - 10.6 mg/dL    Albumin 2.8 (L) 3.4 - 5.0 g/dL    Alkaline Phosphatase 171 (H) 33 - 110 U/L    Total Protein 6.2 (L) 6.4 - 8.2 g/dL    AST 52 (H) 9 - 39 U/L    Bilirubin, Total 2.5 (H) 0.0 - 1.2 mg/dL    ALT 44 7 - 45 U/L   BLOOD GAS VENOUS FULL PANEL   Result Value Ref Range    POCT pH, Venous 7.22 (LL) 7.33 - 7.43 pH    POCT pCO2, Venous 73 (HH) 41 - 51 mm Hg    POCT pO2, Venous 36 35 - 45 mm Hg    POCT SO2, Venous 52 45 - 75 %    POCT Oxy Hemoglobin, Venous 50.9 45.0 - 75.0 %    POCT Hematocrit Calculated, Venous 29.0 (L) 36.0 - 46.0 %    POCT Sodium, Venous 132 (L) 136 - 145 mmol/L    POCT Potassium, Venous 6.2 (HH) 3.5 - 5.3 mmol/L    POCT Chloride, Venous 101 98 - 107 mmol/L    POCT Ionized Calicum, Venous 1.15 1.10 - 1.33 mmol/L    POCT Glucose, Venous 74 74 - 99 mg/dL    POCT Lactate, Venous 1.3 0.4 - 2.0 mmol/L    POCT Base Excess, Venous 1.1 -2.0 - 3.0 mmol/L    POCT HCO3 Calculated, Venous 29.9 (H) 22.0 - 26.0 mmol/L    POCT Hemoglobin, Venous 9.5 (L) 12.0 - 16.0 g/dL     POCT Anion Gap, Venous 7.0 (L) 10.0 - 25.0 mmol/L    Patient Temperature 37.0 degrees Celsius    FiO2 36 %   Manual Differential   Result Value Ref Range    Neutrophils %, Manual 86.1 40.0 - 80.0 %    Lymphocytes %, Manual 8.2 13.0 - 44.0 %    Monocytes %, Manual 5.7 2.0 - 10.0 %    Eosinophils %, Manual 0.0 0.0 - 6.0 %    Basophils %, Manual 0.0 0.0 - 2.0 %    Seg Neutrophils Absolute, Manual 10.16 (H) 1.20 - 7.00 x10*3/uL    Lymphocytes Absolute, Manual 0.97 (L) 1.20 - 4.80 x10*3/uL    Monocytes Absolute, Manual 0.67 0.10 - 1.00 x10*3/uL    Eosinophils Absolute, Manual 0.00 0.00 - 0.70 x10*3/uL    Basophils Absolute, Manual 0.00 0.00 - 0.10 x10*3/uL    Total Cells Counted 122     RBC Morphology See Below     Polychromasia Mild     Target Cells Few     Basophilic Stippling Present     Pappenheimer Bodies Present    POCT GLUCOSE   Result Value Ref Range    POCT Glucose 88 74 - 99 mg/dL   BLOOD GAS ARTERIAL FULL PANEL   Result Value Ref Range    POCT pH, Arterial 7.27 (L) 7.38 - 7.42 pH    POCT pCO2, Arterial 63 (H) 38 - 42 mm Hg    POCT pO2, Arterial 85 85 - 95 mm Hg    POCT SO2, Arterial 98 94 - 100 %    POCT Oxy Hemoglobin, Arterial 94.6 94.0 - 98.0 %    POCT Hematocrit Calculated, Arterial 30.0 (L) 36.0 - 46.0 %    POCT Sodium, Arterial 130 (L) 136 - 145 mmol/L    POCT Potassium, Arterial 5.7 (H) 3.5 - 5.3 mmol/L    POCT Chloride, Arterial 100 98 - 107 mmol/L    POCT Ionized Calcium, Arterial 1.19 1.10 - 1.33 mmol/L    POCT Glucose, Arterial 83 74 - 99 mg/dL    POCT Lactate, Arterial 0.8 0.4 - 2.0 mmol/L    POCT Base Excess, Arterial 1.1 -2.0 - 3.0 mmol/L    POCT HCO3 Calculated, Arterial 28.9 (H) 22.0 - 26.0 mmol/L    POCT Hemoglobin, Arterial 9.9 (L) 12.0 - 16.0 g/dL    POCT Anion Gap, Arterial 7 (L) 10 - 25 mmo/L    Patient Temperature 37.0 degrees Celsius    FiO2 36 %   CBC   Result Value Ref Range    WBC 12.3 (H) 4.4 - 11.3 x10*3/uL    nRBC 7.7 (H) 0.0 - 0.0 /100 WBCs    RBC 2.70 (L) 4.00 - 5.20 x10*6/uL     Hemoglobin 8.1 (L) 12.0 - 16.0 g/dL    Hematocrit 25.1 (L) 36.0 - 46.0 %    MCV 93 80 - 100 fL    MCH 30.0 26.0 - 34.0 pg    MCHC 32.3 32.0 - 36.0 g/dL    RDW 22.1 (H) 11.5 - 14.5 %    Platelets 276 150 - 450 x10*3/uL   Renal function panel   Result Value Ref Range    Glucose 73 (L) 74 - 99 mg/dL    Sodium 135 (L) 136 - 145 mmol/L    Potassium 5.5 (H) 3.5 - 5.3 mmol/L    Chloride 99 98 - 107 mmol/L    Bicarbonate 29 21 - 32 mmol/L    Anion Gap 13 10 - 20 mmol/L    Urea Nitrogen 29 (H) 6 - 23 mg/dL    Creatinine 2.88 (H) 0.50 - 1.05 mg/dL    eGFR 19 (L) >60 mL/min/1.73m*2    Calcium 8.3 (L) 8.6 - 10.6 mg/dL    Phosphorus 5.6 (H) 2.5 - 4.9 mg/dL    Albumin 2.6 (L) 3.4 - 5.0 g/dL   Blood Culture    Specimen: Peripheral Venipuncture; Blood culture   Result Value Ref Range    Blood Culture Loaded on Instrument - Culture in progress    Blood Culture    Specimen: Peripheral Venipuncture; Blood culture   Result Value Ref Range    Blood Culture Loaded on Instrument - Culture in progress    Urine electrolytes   Result Value Ref Range    Sodium, Urine Random 22 mmol/L    Sodium/Creatinine Ratio 17 Not established. mmol/g Creat    Potassium, Urine Random 33 mmol/L    Potassium/Creatinine Ratio 26 Not established mmol/g Creat    Chloride, Urine Random 18 mmol/L    Chloride/Creatinine Ratio 14 (L) 38 - 318 mmol/g creat    Creatinine, Urine Random 127.8 20.0 - 320.0 mg/dL   Urinalysis with Reflex Culture and Microscopic   Result Value Ref Range    Color, Urine Yellow Light-Yellow, Yellow, Dark-Yellow    Appearance, Urine Turbid (N) Clear    Specific Gravity, Urine 1.011 1.005 - 1.035    pH, Urine 5.5 5.0, 5.5, 6.0, 6.5, 7.0, 7.5, 8.0    Protein, Urine 30 (1+) (A) NEGATIVE, 10 (TRACE), 20 (TRACE) mg/dL    Glucose, Urine Normal Normal mg/dL    Blood, Urine OVER (3+) (A) NEGATIVE    Ketones, Urine NEGATIVE NEGATIVE mg/dL    Bilirubin, Urine NEGATIVE NEGATIVE    Urobilinogen, Urine 2 (1+) (A) Normal mg/dL    Nitrite, Urine NEGATIVE  NEGATIVE    Leukocyte Esterase, Urine 500 Cande/µL (A) NEGATIVE   Microscopic Only, Urine   Result Value Ref Range    WBC, Urine 21-50 (A) 1-5, NONE /HPF    RBC, Urine >20 (A) NONE, 1-2, 3-5 /HPF    Squamous Epithelial Cells, Urine 1-9 (SPARSE) Reference range not established. /HPF    Bacteria, Urine 1+ (A) NONE SEEN /HPF    Mucus, Urine FEW Reference range not established. /LPF     *Note: Due to a large number of results and/or encounters for the requested time period, some results have not been displayed. A complete set of results can be found in Results Review.               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 Patient was subsequently intubated due to worsening lethargic and lack of respiratory compensation from significant metabolic acidosis with concern for ACS. Vancomycin and Zosyn started for empiric coverage, patient also started on pressors for BP support. Patient was  transfused 2 units pRBCs and then underwent RBCEx on 9/11. Cardiology consulted for c/f NSTEMI vs demand ischemia (ST depressions and elevated troponin), rec treat as ACS. Course further c/b severe CHARLES with peak sCr of 5.4, and acute liver injury with severely elevated liver enzymes (ALT 4119, AST >10k). Liver US only remarkable for fatty infiltration, viral hepatitis panel negative. CHARLES and liver injury improved with supportive management. Pruritic rash noted 9/14, Derm consulted and took multiple biopsies 9/15. Rash then began to worsen to involve the  groin, lateral thigh, and scalp with numerous tense bullae. Discontinued heparin given concerns for potential HIT, started bivalirudin. Patient was extubated on 9/16/2024. Started stress dose steroids given continued pressor requirements, pressors Dc'd 9/17. Patient transferred to [facility] 9/17, PT/OT rec SNF. Coumadin bridge was started 9/18. Given persistent rash with unremarkable labs, derm re-biopsied on 9/18, findings ultimately felt to be most c/w fixed drug eruption. Dermatology contacted for bx results (9/20) rec triamcinolone cream BID and interdry cloths. Leukocytosis uptrending 9/21, repeat infectious workup negative (CXR, Abd XR, blood cultures, MRSA negative 9/21). UA with leuks, urine culture pending (9/21). On 9/25, per nursing and patient, rash seems to be worsening. Derm re-engaged recommended to apply vasoline to all eroded/denuded areas and continue triamcinolone cream to both the black plaques as well as the red areas across the trunk and thighs. 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 Wound care following. For warfarin dosing, per pharmacy, if INR between 2-3 can restart home warfarin 5mg daily, if INR < 2 restart bival. INR 3.4 on 9/25, warfarin stopped, repeat INR 2.8 9/26 and warfarin restarted. On 9/26, increased PO oxycodone and discontinued IV dilaudid in anticipation for discharge. On 9/29 began having waxing/waning mental status and respiratory acidosis. Mental status improved with narcan. All opioids discontinued. BUN/creatinine elevated again. Renal re-engaged.       #Waxing and waning AMS  -felt to be due to opioids i/s/o worsening renal function and c/b respiratory acidosis  -all opioids discontinued  -keep supplemental O2 on board, but titrate to maintain SPO2 of 90-92% at most  -on CPAP at home, enforce nightly use to extent possible; may need BiPAP if unresolved.    #Purpuric rash,  suspected fixed drug eruption  #Concern for possible heparin-induced thrombocytopenia (HIT) -resolved  -Purple, reticular pattern of plaques noted around the bilateral breasts, scalp and groin,  with tense bullae. Non-erythematous, no purulence  - Some initial concern for possible heparin-induced thrombocytopenia with concurrent skin findings. Discontinued heparin 9/16 and transitioned to bivalirudin  -4 T score 0, PF4 w/ reflex BINA negative. Hematology ultimately consulted, felt there was low concern for HIT and signed off  -Differential diagnosis includes purpura secondary to infection versus coagulopathy versus vasculopathy versus small and/or medium vessel vasculitis versus calciphylaxis vs drug rash  -Dermatology consulted, work-up to date:  - Low Protein C activity  - ANCA, PR3 and MPO negative  - ISABELLE neg  - cryoglobulin labs- not enough specimen to analyze  - S/p skin punch biopsies x 2 9/15 and one on 9/18, showed SUPERFICIAL EPIDERMAL DEGENERATION WITH ERYTHROCYTE EXTRAVASATION WITH NEUTROPHILS. These findings could be seen secondary to an older trauma, or resolving erythema multiforme, a fixed drug eruption, Edgar-Christopher syndrome, or toxic epidermal necrolysis. Favor Multifocal Fixed Drug Eruption    - Dermatology contacted (9/20) about results, recommended adding vancomycin to allergy list and starting triamcinolone  - Continue triamcinolone 0.1% cream BID (9/20-current) per derm recs  - Plan for left breat suture removal around 9/25-9/27 (per derm, sutures from flank were dissolvable)  - On 9/25, concern from pt and nursing that rash is not improving. Under breasts and groin still causing pain. Bilateral upper thighs and forehead rash improving   - Wound care re-consulted 9/25   - Re-engaged dermatology on 9/25 given worsening rash; recommended to apply vasoline to all eroded/denuded areas and continue triamcinolone cream to both the black plaques as well as the red areas across the trunk and thighs.  Per dermatology, erythema is mild and not palpable, it is most likely part of the drug eruption the patient has previously been known to have     #Leukocytosis -was Improving, now very mild uptrend  - Likely I/s/o rash vs reactive vs infectious process  - leukocytosis noted on admission to MICU (WBC 24.8) --> 12.7 (9/25) --> 9.8   - leukocytosis improved throughout hospital stay however started uptrending 9/20; now improving on 9/25   - Blood cultures 9/10, 9/12, 9/15 all NGTD; redrawn on 9/29 d/t AMS  - Strep and Legionella Ag, MRSA nares negative  - CXR (9/21) largely unremarkable, again re-demonstrated perihilar prominence seen on prior XR  - Abdominal xray (9/21) unremarkable  - Repeat blood cultures x 2 (9/21) NGTD  - UA (9/21) with 500 LE, cx negative     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  # ACS  - Previously managed through routine RBC exchanges q6 weeks, most recent RBCEx 3/1/24, per patient pausing on transfusions for time being  - OARRS reviewed, no aberrant behavior noted  - LDH>12,000 (now downtrending), haptoglobin <30, fibrinogen 318, retic % 5.2 on presentation, bili 10.7  - leukocytosis 24.8 on admission to MICU  - CXR (9/10) persistent atelectasis/scarring in the left lower lung  - CT CAP (9/11) Diffuse mosaic attenuation of the lung parenchyma, which can be seen in the setting of small airway disease, pulmonary edema or acute infection  - intubated upon arrival to MICU, now s/p extubation 9/16  - started on vanc and zosyn (finished both courses prior to floor transfer)  - Procal elevated 7.09 (9/11)  - Blood cultures 9/10, 9/12, 9/15 all NGTD  - Strep and Legionella Ag, MRSA nares negative  - trop peak 1431, cards rec treat as ACS (see below)  - S/p 2u pRBCs 9/10 on arrival to MICU  - S/p exchange transfusion 9/11 AM for ACS  - Care plan from 12/6/23- For mild to moderate pain: Dilaudid 0.5mg IVP q3h as needed, for moderate to severe pain Dilaudid 1- 1.5mg q3h PRN  - On arrival to [facility], started on 0.6mg  dilaudid IVP q3h PRN severe pain   - s/p 0.6mg dilaudid IVP q4h PRN breakthrough and 15mg PO oxycodone Q4 hrs severe pain (9/25)  - Per discussion with Dr. Whaley (9/25), okay to increase PO oxycodone to 15-20mg Q4hr to optimize pain for discharge to SNF.   - On 9/26, discontinued IV dilaudid and increased to 20mg PO oxycodone q4hrs for severe pain   - Bowel regimen for opioid induced constipation, zofran for opioid induced nausea, and benadrly for opioid induced pruritus  - c/w home Duloxetine 40mg daily, PO Zofran PRN, Folic Acid 1mg daily, and MVI daily  - all opioid pain meds mentioned above are placed on hold 9/29 due to AMS     # ST depression with tropinemia, suspected 2/2 demand ischemia iso ACS  # Hx SVT  - Elevated troponins on admission, peak 1431 and now downtrending   - EKG with ST depressions, likely Type II MI due to vaso-occlusive crisis and anemia  - TTE 9/11: EF 60-65%, RV enlarged and reduced in function which appears stable when compared to last TTE (1/2024)  - Troponin leak is likely due to cardiac demand vs supply mismatch in the setting of worsening anemia and vaso-occlusive crisis  - Cardiology consulted, now signed off with indication to treat troponemia as ACS  - Lasix held in MICU 2/2 hypotension and CHARLES, can resume as able   - Follow-up with Cardiology outpt, FUV 2/13/25    # CHARLES on CKD II, was improving, now creatinine uptrending atain  - Baseline ~ SCr <2, peak 5.4 this admission. 9/21 sCr 1.58 --> 1.13 (9/25)   - likely mixed pre-renal/intra-renal, on CKD II, now polyuric phase  - FeNa 1.2%, consistent with ATN  - Severe hyperkalemia with previous peaked T waves on EKG, resolved  - Nephrology had signed off; have asked to see her again 9/29  - 500 ml bolus NS given over 2 hours  - metoprolol placed on hold given soft BPs  - Lokelma 10 g once today  - Strict I/O charting   - Avoid nephrotoxic agents.   - F/U Urinalysis   - F/U urine electrolytes  - F/U RFP       # Acute liver injury,  improving  # Direct hyperbilirubinuria, improving  - Suspected 2/2 hemodynamic instability versus sickle cell crisis  - [facility], T bili 10.2, INR 3.0; continues to improve  - CT with hepatic venous congestion, patient lacks gallbladder  - Liver US with Doppler: Diffuse fatty infiltration, unremarkable doppler interrogation of the liver  - EBV IgG positive, IgM negative  - CMV IgG positive, PCR negative  - Acute hepatitis panel unremarkable  - Hepatology initially following, now signed off  - Will continue to monitor, trend daily CMP     # pHTN   - Initial c/f CTEPH as imaging findings with dilated PA, filling defects, and mosaicism  - VQ scan (6/13/23) with non anatomical basal defects and RUL defect due to scar--> not favoring CTEPH per Dr. Yi and Dr. 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 On lifelong anticoagulation, DOAC discouraged due to high risk of clotting   - home Coumadin 5mg daily initially HELD on admission to MICU 2/2 unstable course  - restarted coumadin 9/18 PM, will plan to bridge with bival, Ok'd by heme   - 9/20 INR 3.8, bival and home warfarin held. Pharmacy rec repeat INR 4 hours after bival was held. If the INR < 2, we can restart the bival, but if the INR 2-3, we can continue with warfarin monotherapy   - 9/20 repeat INR 3.7, pharmacy rec continuing to hold both  medications  - 9/21 INR 3.2, per pharmacy - get repeat INR in AM 9/22, if INR between 2-3 can restart home warfarin 5mg daily  -INR 2.6 (9/24) after restarting Warfarin 5mg, will maintain INR goal of 2-3  - INR 3.4 (9/25). Held warfarin on 9/25. Repeat INR 9/26. Plan to restart if <3   - INR 2.8 (9/26), restarted warfarin, recheck INR 9/27   - Follows with Coumadin clinic outpatient  - Plan to consult vascular medicine if INR is not therapeutic on home regimen      # CAN  - cont home CPAP   - cont home Albuterol PRN     # H/O Cauda Equine syndrome  - Follows Dr. Delacruz as outpatient  - no current issues      # DISPO  - Full code- confirmed on admission  - NOK: Tati Salgado (mother) (#[phone])  - PT/OT rec SNF, pending choice/placement  - FUV PCP 10/1, Anim 10/7, Cardiology 2/13       I spent 90 minutes in the professional and overall care of this patient.      Raven Roach, APRN-CNP

## 2024-09-29 NOTE — SIGNIFICANT EVENT
Rapid Response Nurse Note: [] Rapid Response [x] RADAR alert: Score 7  Pager time: [phone]  Arrival time: 945  Event end time:   Location: [facility] 4024  [] Phone triage     Rapid response initiated by:  [] Rapid Response RN [] Family [] Nursing Supervisor [] Physician   [x] RADAR auto-page [] Sepsis auto-page [] RN [] RT   [] NP/PA [] Other:     Primary reason for call:   [] BAT [] New CPAP/BiPAP [] Bleeding [] Change in mental status   [] Chest pain [] Code blue [] FiO2 >/= 50% [] HR </= 40 bpm   [] HR >/= 130 bpm [] Hyperglycemia [] Hypoglycemia [x] RADAR    [] RR </= 8 bpm [] RR >/= 30 bpm [] SBP </= 90 mmHg [] SpO2 < 90%   [] Seizure [] Sepsis [] Staff concern:     Initial VS and/or RADAR VS: T 37.2 °C; HR 93; RR 14; BP 99/40; SPO2 93%.  Providers present at bedside: primary RN, Rapid Response RN  Objective/Subjective data relevant to event: altered mental status    Interventions:  [x] None [] ABG [] Assist w/ICU transfer [] BAT paged    [] Bag mask [] Blood [] Cardioversion [] Code Blue   [] Code blue for intubation [] Code status changed [] Chest x-ray [] EKG   [] IV fluid/bolus [] KUB x-ray [] Labs/cultures [] Medication   [] Nebulizer treatment [] NIPPV (CPAP/BiPAP) [] Oxygen [] Oral airway   [] Peripheral IV [] Palliative care consult [] CT/MRI [] Sepsis protocol    [] Suctioned [] Other:     Name of ICU Provider contacted (if applicable):     Outcome:  [] Coded and  [] Code blue for intubation [] Coded and transferred to ICU []  on division   [x] Remained on division (no change) [] Remained on division + additional monitoring [] Remained in ED [] Transferred to ED   [] Transferred to ICU [] Transferred to inpatient status [] Transferred for interventions (procedure) [] Transferred to ICU stepdown    [] Transferred to surgery [] Transferred to telemetry [] Sepsis protocol [] STEMI protocol   [] Stroke protocol [x] Bedside nurse instructed to page rapid response for any concerns or acute change  in condition/VS     Additional Comments:     Radar auto-page received for a radar score of 7 with the above listed vital signs.  Vital signs were confirmed and reviewed with primary RN.  Patient still with altered mental status AO X1-2 and lethargic.      AB.27, 63, 85, SaO2 98%, Lactate 0.8, HCO3 28.9    She has received multiple narcan doses since last night.    Contacted [place] team, Raven Roach CNP to review plan of care.  Awaiting reply.    RN to contact Rapid Response with any future concerns or signs of clinical decompensation.

## 2024-09-29 NOTE — CARE PLAN
The patient's goals for the shift include      The clinical goals for the shift include Pt will remain HDs and VSs through end of shift 9/29/2024 1900      Problem: Pain - Adult  Goal: Verbalizes/displays adequate comfort level or baseline comfort level  9/29/2024 1233 by Marlene Juares RN  Outcome: Progressing  9/29/2024 1233 by Marlene Juares RN  Outcome: Progressing     Problem: Safety - Adult  Goal: Free from fall injury  9/29/2024 1233 by Marlene Juares RN  Outcome: Progressing  9/29/2024 1233 by Marlene Juares RN  Outcome: Progressing     Problem: Skin  Goal: Decreased wound size/increased tissue granulation at next dressing change  9/29/2024 1233 by Marlene Juares RN  Outcome: Progressing  Flowsheets (Taken 9/29/2024 1233)  Decreased wound size/increased tissue granulation at next dressing change: Promote sleep for wound healing  9/29/2024 1233 by Marlene Juares RN  Outcome: Progressing  Flowsheets (Taken 9/29/2024 1233)  Decreased wound size/increased tissue granulation at next dressing change: Promote sleep for wound healing  Goal: Participates in plan/prevention/treatment measures  9/29/2024 1233 by Marlene Juares RN  Outcome: Progressing  Flowsheets (Taken 9/29/2024 1233)  Participates in plan/prevention/treatment measures: Increase activity/out of bed for meals  9/29/2024 1233 by Marlene Juares RN  Outcome: Progressing  Flowsheets (Taken 9/29/2024 1233)  Participates in plan/prevention/treatment measures: Increase activity/out of bed for meals  Goal: Prevent/manage excess moisture  9/29/2024 1233 by Marlene Juares RN  Outcome: Progressing  Flowsheets (Taken 9/29/2024 1233)  Prevent/manage excess moisture: Follow provider orders for dressing changes  9/29/2024 1233 by Marlene Juares RN  Outcome: Progressing  Flowsheets (Taken 9/29/2024 1233)  Prevent/manage excess moisture: Follow provider orders for dressing changes  Goal: Prevent/minimize sheer/friction injuries  9/29/2024  1233 by Marlene Juares RN  Outcome: Progressing  Flowsheets (Taken 9/29/2024 1233)  Prevent/minimize sheer/friction injuries: Use pull sheet  9/29/2024 1233 by Marlene Juares RN  Outcome: Progressing  Flowsheets (Taken 9/29/2024 1233)  Prevent/minimize sheer/friction injuries: Use pull sheet  Goal: Promote/optimize nutrition  9/29/2024 1233 by Marlene Juares RN  Outcome: Progressing  Flowsheets (Taken 9/29/2024 1233)  Promote/optimize nutrition:   Consume > 50% meals/supplements   Offer water/supplements/favorite foods  9/29/2024 1233 by Marlene Juares RN  Outcome: Progressing  Flowsheets (Taken 9/29/2024 1233)  Promote/optimize nutrition:   Consume > 50% meals/supplements   Offer water/supplements/favorite foods  Goal: Promote skin healing  9/29/2024 1233 by Marlene Juares RN  Outcome: Progressing  Flowsheets (Taken 9/29/2024 1233)  Promote skin healing: Assess skin/pad under line(s)/device(s)  9/29/2024 1233 by Marlene Juares RN  Outcome: Progressing  Flowsheets (Taken 9/29/2024 1233)  Promote skin healing: Assess skin/pad under line(s)/device(s)     Problem: Knowledge Deficit  Goal: Patient/family/caregiver demonstrates understanding of disease process, treatment plan, medications, and discharge instructions  9/29/2024 1233 by Marlene Juares RN  Outcome: Progressing  9/29/2024 1233 by Marlene Juares RN  Outcome: Progressing     Problem: Mechanical Ventilation  Goal: Patient Will Maintain Patent Airway  9/29/2024 1233 by Marlene Juares RN  Outcome: Progressing  9/29/2024 1233 by Marlene Juares RN  Outcome: Progressing  Goal: Oral health is maintained or improved  9/29/2024 1233 by Marlene Juares RN  Outcome: Progressing  9/29/2024 1233 by Marlene Juares RN  Outcome: Progressing  Goal: Tracheostomy will be managed safely  9/29/2024 1233 by Marlene Juares RN  Outcome: Progressing  9/29/2024 1233 by Marlene Juares RN  Outcome: Progressing  Goal: ET tube will be managed  safely  9/29/2024 1233 by Marlene Juares RN  Outcome: Progressing  9/29/2024 1233 by Marlene Juares RN  Outcome: Progressing  Goal: Ability to express needs and understand communication  9/29/2024 1233 by Marlene Juares RN  Outcome: Progressing  9/29/2024 1233 by Marlene Juares RN  Outcome: Progressing  Goal: Mobility/activity is maintained at optimum level for patient  9/29/2024 1233 by Marlene Juares RN  Outcome: Progressing  9/29/2024 1233 by Marlene Juares RN  Outcome: Progressing     Problem: Pain  Goal: Takes deep breaths with improved pain control throughout the shift  9/29/2024 1233 by Marlene Juares RN  Outcome: Progressing  9/29/2024 1233 by Marlene Juares RN  Outcome: Progressing  Goal: Turns in bed with improved pain control throughout the shift  9/29/2024 1233 by Marlene Juares RN  Outcome: Progressing  9/29/2024 1233 by Marlene Juares RN  Outcome: Progressing  Goal: Walks with improved pain control throughout the shift  9/29/2024 1233 by Marlene Juares RN  Outcome: Progressing  9/29/2024 1233 by Marlene Juares RN  Outcome: Progressing  Goal: Performs ADL's with improved pain control throughout shift  9/29/2024 1233 by Marlene Juares RN  Outcome: Progressing  9/29/2024 1233 by Marlene Juares RN  Outcome: Progressing  Goal: Participates in PT with improved pain control throughout the shift  9/29/2024 1233 by Marlene Juares RN  Outcome: Progressing  9/29/2024 1233 by Marlene Juares RN  Outcome: Progressing  Goal: Free from opioid side effects throughout the shift  9/29/2024 1233 by Marlene Juares RN  Outcome: Progressing  9/29/2024 1233 by Marlene Juares RN  Outcome: Progressing  Goal: Free from acute confusion related to pain meds throughout the shift  9/29/2024 1233 by Marlene Juares RN  Outcome: Progressing  9/29/2024 1233 by Marlene Juares RN  Outcome: Progressing     Problem: Nutrition  Goal: Less than 5 days NPO/clear liquids  9/29/2024  1233 by Marlene Juares RN  Outcome: Progressing  9/29/2024 1233 by Marlene Juares RN  Outcome: Progressing  Goal: Oral intake greater than 50%  9/29/2024 1233 by Marlene Juares RN  Outcome: Progressing  9/29/2024 1233 by Marlene Juares RN  Outcome: Progressing  Goal: Oral intake greater 75%  9/29/2024 1233 by Marlene Juares RN  Outcome: Progressing  9/29/2024 1233 by Marlene Juares RN  Outcome: Progressing  Goal: Consume prescribed supplement  9/29/2024 1233 by Marlene Juares RN  Outcome: Progressing  9/29/2024 1233 by Marlene Juares RN  Outcome: Progressing  Goal: Adequate PO fluid intake  9/29/2024 1233 by Marlene Juares RN  Outcome: Progressing  9/29/2024 1233 by Marlene Juares RN  Outcome: Progressing  Goal: Nutrition support goals are met within 48 hrs  9/29/2024 1233 by Marlene Juares RN  Outcome: Progressing  9/29/2024 1233 by Marlene Juares RN  Outcome: Progressing  Goal: Nutrition support is meeting 75% of nutrient needs  9/29/2024 1233 by Marlene Juares RN  Outcome: Progressing  9/29/2024 1233 by Marlene Juares RN  Outcome: Progressing  Goal: Tube feed tolerance  9/29/2024 1233 by Marlene Juares RN  Outcome: Progressing  9/29/2024 1233 by Marlene Juares RN  Outcome: Progressing  Goal: BG  mg/dL  9/29/2024 1233 by Marlene Juares RN  Outcome: Progressing  9/29/2024 1233 by Marlene Juares RN  Outcome: Progressing  Goal: Lab values WNL  9/29/2024 1233 by Marlene Juares RN  Outcome: Progressing  9/29/2024 1233 by Marlene Juares RN  Outcome: Progressing  Goal: Electrolytes WNL  9/29/2024 1233 by Marlene Juares RN  Outcome: Progressing  9/29/2024 1233 by Marlene Juares RN  Outcome: Progressing  Goal: Promote healing  9/29/2024 1233 by Marlene Juares RN  Outcome: Progressing  9/29/2024 1233 by Marlene Juares RN  Outcome: Progressing  Goal: Maintain stable weight  9/29/2024 1233 by Marlene Juares RN  Outcome: Progressing  9/29/2024  1233 by Marlene Juares RN  Outcome: Progressing  Goal: Reduce weight from edema/fluid  9/29/2024 1233 by Marlene Juares, RN  Outcome: Progressing  9/29/2024 1233 by Marlene Juares RN  Outcome: Progressing  Goal: Gradual weight gain  9/29/2024 1233 by Marlene Juares, RN  Outcome: Progressing  9/29/2024 1233 by Marlene Juares RN  Outcome: Progressing  Goal: Improve ostomy output  9/29/2024 1233 by Marlene Juares RN  Outcome: Progressing  9/29/2024 1233 by Marlene Juares RN  Outcome: Progressing     Problem: Fall/Injury  Goal: Not fall by end of shift  9/29/2024 1233 by Marlnee Juares RN  Outcome: Progressing  9/29/2024 1233 by Marlene Juares RN  Outcome: Progressing  Goal: Be free from injury by end of the shift  9/29/2024 1233 by Marlene Juares, RN  Outcome: Progressing  9/29/2024 1233 by Marlene Juares RN  Outcome: Progressing  Goal: Verbalize understanding of personal risk factors for fall in the hospital  9/29/2024 1233 by Marlene Juares, RN  Outcome: Progressing  9/29/2024 1233 by Marlene Juares RN  Outcome: Progressing  Goal: Verbalize understanding of risk factor reduction measures to prevent injury from fall in the home  9/29/2024 1233 by Marlene Juares, RN  Outcome: Progressing  9/29/2024 1233 by Marlene Juares RN  Outcome: Progressing  Goal: Use assistive devices by end of the shift  9/29/2024 1233 by Marlene Juares, RN  Outcome: Progressing  9/29/2024 1233 by Marlene Juares RN  Outcome: Progressing  Goal: Pace activities to prevent fatigue by end of the shift  9/29/2024 1233 by Marlene Juares RN  Outcome: Progressing  9/29/2024 1233 by Marlene Juares RN  Outcome: Progressing

## 2024-09-29 NOTE — ASSESSMENT & PLAN NOTE
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 Patient was subsequently intubated due to worsening lethargic and lack of respiratory compensation from significant metabolic acidosis with concern for ACS. Vancomycin and Zosyn started for empiric coverage, patient also started on pressors for BP support. Patient was  transfused 2 units pRBCs and then underwent RBCEx on 9/11. Cardiology consulted for c/f NSTEMI vs demand ischemia (ST depressions and elevated troponin), rec treat as ACS. Course further c/b severe CHARLES with peak sCr of 5.4, and acute liver injury with severely elevated liver enzymes (ALT 4119, AST >10k). Liver US only remarkable for fatty infiltration, viral hepatitis panel negative. CHARLES and liver injury improved with supportive management. Pruritic rash noted 9/14, Derm consulted and took multiple biopsies 9/15. Rash then began to worsen to involve the groin, lateral thigh, and scalp with numerous tense bullae. Discontinued heparin given concerns for potential HIT, started bivalirudin. Patient was extubated on 9/16/2024. Started stress dose steroids given continued pressor requirements, pressors Dc'd 9/17. Patient transferred to [facility] 9/17, PT/OT rec SNF. Coumadin bridge was started 9/18. Given persistent rash with unremarkable labs, derm re-biopsied on 9/18, findings ultimately felt to be most c/w fixed drug eruption. Dermatology contacted for bx results (9/20) rec triamcinolone cream BID and interdry cloths. Leukocytosis uptrending 9/21, repeat infectious workup negative (CXR, Abd XR, blood cultures, MRSA negative 9/21).  UA with leuks, urine culture pending (9/21). On 9/25, per nursing and patient, rash seems to be worsening. Derm re-engaged recommended to apply vasoline to all eroded/denuded areas and continue triamcinolone cream to both the black plaques as well as the red areas across the trunk and thighs. 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 Wound care following. For warfarin dosing, per pharmacy, if INR between 2-3 can restart home warfarin 5mg daily, if INR < 2 restart bival. INR 3.4 on 9/25, warfarin stopped, repeat INR 2.8 9/26 and warfarin restarted. On 9/26, increased PO oxycodone and discontinued IV dilaudid in anticipation for discharge. On 9/29 began having waxing/waning mental status and respiratory acidosis. Mental status improved with narcan. All opioids discontinued. BUN/creatinine elevated again. Renal re-engaged.     #Purpuric rash, suspected fixed drug eruption  #Concern for possible heparin-induced thrombocytopenia (HIT) -resolved  -Purple, reticular pattern of plaques noted around the bilateral breasts, scalp and groin,  with tense bullae. Non-erythematous, no purulence  - Some initial concern for possible heparin-induced thrombocytopenia with concurrent skin findings. Discontinued heparin 9/16 and transitioned to bivalirudin  -4 T score 0, PF4 w/ reflex BINA negative. Hematology ultimately consulted, felt there was low concern for HIT and signed off  -Differential diagnosis includes purpura secondary to infection versus coagulopathy versus vasculopathy versus small and/or medium vessel vasculitis versus calciphylaxis vs drug rash  -Dermatology consulted, work-up to date:  - Low Protein C activity  - ANCA, PR3 and MPO negative  - ISABELLE neg  - cryoglobulin labs- not enough specimen to analyze  - S/p skin punch biopsies x 2 9/15 and one on 9/18, showed SUPERFICIAL EPIDERMAL DEGENERATION WITH ERYTHROCYTE  EXTRAVASATION WITH NEUTROPHILS. These findings could be seen secondary to an older trauma, or resolving erythema multiforme, a fixed drug eruption, Edgar-Christopher syndrome, or toxic epidermal necrolysis. Favor Multifocal Fixed Drug Eruption    - Dermatology contacted (9/20) about results, recommended adding vancomycin to allergy list and starting triamcinolone  - Continue triamcinolone 0.1% cream BID (9/20-current) per derm recs  - Plan for left breat suture removal around 9/25-9/27 (per derm, sutures from flank were dissolvable)  - On 9/25, concern from pt and nursing that rash is not improving. Under breasts and groin still causing pain. Bilateral upper thighs and forehead rash improving   - Wound care re-consulted 9/25   - Re-engaged dermatology on 9/25 given worsening rash; recommended to apply vasoline to all eroded/denuded areas and continue triamcinolone cream to both the black plaques as well as the red areas across the trunk and thighs. Per dermatology, erythema is mild and not palpable, it is most likely part of the drug eruption the patient has previously been known to have     #Leukocytosis -Improving  - Likely I/s/o rash vs reactive vs infectious process  - leukocytosis noted on admission to MICU (WBC 24.8) --> 12.7 (9/25) --> 9.8   - leukocytosis improved throughout hospital stay however started uptrending 9/20; now improving on 9/25   - Blood cultures 9/10, 9/12, 9/15 all NGTD  - Strep and Legionella Ag, MRSA nares negative  - CXR (9/21) largely unremarkable, again re-demonstrated perihilar prominence seen on prior XR  - Abdominal xray (9/21) unremarkable  - Repeat blood cultures x 2 (9/21) NGTD  - UA (9/21) with 500 LE, urine cx pending     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  # ACS  - Previously managed through routine RBC exchanges q6 weeks, most recent RBCEx 3/1/24, per patient pausing on transfusions for time being  - OARRS reviewed, no aberrant behavior noted  - LDH>12,000 (now downtrending),  haptoglobin <30, fibrinogen 318, retic % 5.2 on presentation, bili 10.7  - leukocytosis 24.8 on admission to MICU  - CXR (9/10) persistent atelectasis/scarring in the left lower lung  - CT CAP (9/11) Diffuse mosaic attenuation of the lung parenchyma, which can be seen in the setting of small airway disease, pulmonary edema or acute infection  - intubated upon arrival to MICU, now s/p extubation 9/16  - started on vanc and zosyn (finished both courses prior to floor transfer)  - Procal elevated 7.09 (9/11)  - Blood cultures 9/10, 9/12, 9/15 all NGTD  - Strep and Legionella Ag, MRSA nares negative  - trop peak 1431, cards rec treat as ACS (see below)  - [facility]  - S/p exchange transfusion 9/11 AM for ACS  - Care plan from 12/6/23- For mild to moderate pain: Dilaudid 0.5mg IVP q3h as needed, for moderate to severe pain Dilaudid 1- 1.5mg q3h PRN  - On arrival to [facility], started on 0.6mg dilaudid IVP q3h PRN severe pain   - s/p 0.6mg dilaudid IVP q4h PRN breakthrough and 15mg PO oxycodone Q4 hrs severe pain (9/25)  - Per discussion with Dr. Whaley (9/25), okay to increase PO oxycodone to 15-20mg Q4hr to optimize pain for discharge to SNF.   - On 9/26, discontinued IV dilaudid and increased to 20mg PO oxycodone q4hrs for severe pain   - Bowel regimen for opioid induced constipation, zofran for opioid induced nausea, and benadrly for opioid induced pruritus  - c/w home Duloxetine 40mg daily, PO Zofran PRN, Folic Acid 1mg daily, and MVI daily     # ST depression with tropinemia, suspected 2/2 demand ischemia iso ACS  # Hx SVT  - Elevated troponins on admission, peak 1431 and now downtrending   - EKG with ST depressions, likely Type II MI due to vaso-occlusive crisis and anemia  - TTE 9/11: EF 60-65%, RV enlarged and reduced in function which appears stable when compared to last TTE (1/2024)  - Troponin leak is likely due to cardiac demand vs supply mismatch in the setting of worsening anemia and  vaso-occlusive crisis  - Cardiology consulted, now signed off with indication to treat troponemia as ACS  - Lasix held in MICU 2/2 hypotension and CHARLES, can resume as able   - Follow-up with Cardiology outpt, FUV 2/13/25    # CHARLES on CKD II, improving  - Baseline ~ SCr <2, peak 5.4 this admission. 9/21 sCr 1.58 --> 1.13 (9/25)   - likely mixed pre-renal/intra-renal, on CKD II, now polyuric phase  - FeNa 1.2%, consistent with ATN  - Severe hyperkalemia with previous peaked T waves on EKG, resolved  - Nephrology followed, now signed off, no need for HD  - s/p multiple K cocktails in MICU  - encourage increased PO intake     # Acute liver injury, improving  # Direct hyperbilirubinuria, improving  - Suspected 2/2 hemodynamic instability versus sickle cell crisis  - On presentation to  MICU ALT 2611, AST 4727, T bili 10.2, INR 3.0; continues to improve  - CT with hepatic venous congestion, patient lacks gallbladder  - Liver US with Doppler: Diffuse fatty infiltration, unremarkable doppler interrogation of the liver  - EBV IgG positive, IgM negative  - CMV IgG positive, PCR negative  - Acute hepatitis panel unremarkable  - Hepatology initially following, now signed off  - Will continue to monitor, trend daily CMP     # pHTN   - Initial c/f CTEPH as imaging findings with dilated PA, filling defects, and mosaicism  - VQ scan (6/13/23) with non anatomical basal defects and RUL defect due to scar--> not favoring CTEPH per Dr. Yi and Dr. 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 On lifelong anticoagulation, DOAC discouraged due to high risk of clotting   - home Coumadin 5mg daily initially HELD on admission to MICU 2/2 unstable course  - restarted coumadin 9/18 PM, will plan to bridge with bival, Ok'd by heme   - 9/20 INR 3.8, bival and home warfarin held. Pharmacy rec repeat INR 4 hours after bival was held. If the INR < 2, we can restart the bival, but if the INR 2-3, we can continue with warfarin monotherapy   - 9/20 repeat INR 3.7, pharmacy rec continuing to hold both medications  - 9/21 INR 3.2, per pharmacy - get repeat INR in AM 9/22, if INR between 2-3 can restart home warfarin 5mg daily  -INR 2.6 (9/24) after restarting Warfarin 5mg, will maintain INR goal of 2-3  - INR 3.4 (9/25). Held warfarin on 9/25. Repeat INR 9/26. Plan to restart if <3   - INR 2.8 (9/26), restarted warfarin, recheck INR 9/27   - Follows with Coumadin clinic outpatient  - Plan to consult vascular medicine if INR is not therapeutic on home regimen      # CAN  - cont home CPAP   - cont home Albuterol PRN     # H/O Cauda Equine syndrome  - Follows Dr. Delacruz as outpatient  - no current issues      # DISPO  - Full code- confirmed on admission  - NOK: Tati Salgado (mother) (#[phone])  - PT/OT rec SNF, pending choice/placement  - FUV PCP 10/1, Anim 10/7, Cardiology 2/13

## 2024-09-29 NOTE — SIGNIFICANT EVENT
Rapid/BAT called for AMS, decreased responsiveness.   Glucose 84, vitals as charted.   BAT/rapid team at bedside.  ABG ordered, narcan ordered.   0239 Narcan administered.   0241 patient alert and oriented, more responsive. Labs drawn, ABG changed to VBG.   CPAP applied.

## 2024-09-29 NOTE — PROGRESS NOTES
Senait Narvaez   54 y.o.      MRN/Room: [id number]/[id number]/4024-A    Subjective: Limited as patient only responding yes or no to questioning    Objective:     Meds:   folic acid, 1 mg, Daily  metoprolol tartrate, 12.5 mg, BID  nystatin, 1 Application, BID  [Held by provider] oxyCODONE ER, 10 mg, q12h BEATA  oxygen, , Continuous - Inhalation  pantoprazole, 40 mg, Daily  polyethylene glycol, 17 g, BID  sennosides, 2 tablet, BID  thiamine, 100 mg, Daily  triamcinolone, , BID  warfarin, 5 mg, Daily  white petrolatum, , BID         acetaminophen, 975 mg, q12h PRN  albuterol, 2.5 mg, q6h PRN  alteplase, 2 mg, PRN  bisacodyl, 10 mg, Daily PRN  dextrose, 12.5 g, q15 min PRN  dextrose, 25 g, q15 min PRN  glucagon, 1 mg, q15 min PRN  glucagon, 1 mg, q15 min PRN  naloxone, 0.4 mg, q5 min PRN  ondansetron, 4 mg, q8h PRN  oxyCODONE, 20 mg, q4h PRN  oxygen, , Continuous PRN - O2/gases  sodium chloride, 1 spray, 4x daily PRN        Vitals:    09/29/24 0940   BP: (!) 99/40   Pulse: 93   Resp: 14   Temp: 37.2 °C (99 °F)   SpO2: 93%        No intake or output data in the 24 hours ending 09/29/24 1114    General appearance: no distress  Eyes: non-icteric  Heart: Regular, Rate and rhythm   Lungs: CTA bilat No wheezing/crackles  Abdomen: soft, nt/nd  Extremities: Trace edema bilateral lower extremities   No Slater  Neuro: AxOx2      Blood Labs:  Results for orders placed or performed during the hospital encounter of 09/10/24 (from the past 24 hour(s))   POCT GLUCOSE   Result Value Ref Range    POCT Glucose 84 74 - 99 mg/dL   Lactate dehydrogenase   Result Value Ref Range     (H) 84 - 246 U/L   Reticulocytes   Result Value Ref Range    Retic % 12.4 (H) 0.5 - 2.0 %    Retic Absolute 0.313 (H) 0.018 - 0.083 x10*6/uL    Reticulocyte Hemoglobin 28 28 - 38 pg    Immature Retic fraction 10.7 <=16.0 %   CBC and Auto Differential   Result Value Ref Range    WBC 11.8 (H) 4.4 - 11.3 x10*3/uL    nRBC 7.1 (H) 0.0 - 0.0 /100 WBCs    RBC 2.53 (L) 4.00 -  5.20 x10*6/uL    Hemoglobin 7.7 (L) 12.0 - 16.0 g/dL    Hematocrit 25.1 (L) 36.0 - 46.0 %    MCV 99 80 - 100 fL    MCH 30.4 26.0 - 34.0 pg    MCHC 30.7 (L) 32.0 - 36.0 g/dL    RDW 23.4 (H) 11.5 - 14.5 %    Platelets 237 150 - 450 x10*3/uL    Immature Granulocytes %, Automated 0.7 0.0 - 0.9 %    Immature Granulocytes Absolute, Automated 0.08 0.00 - 0.70 x10*3/uL   Comprehensive metabolic panel   Result Value Ref Range    Glucose 64 (L) 74 - 99 mg/dL    Sodium 134 (L) 136 - 145 mmol/L    Potassium 5.9 (H) 3.5 - 5.3 mmol/L    Chloride 99 98 - 107 mmol/L    Bicarbonate 24 21 - 32 mmol/L    Anion Gap 17 10 - 20 mmol/L    Urea Nitrogen 28 (H) 6 - 23 mg/dL    Creatinine 2.93 (H) 0.50 - 1.05 mg/dL    eGFR 18 (L) >60 mL/min/1.73m*2    Calcium 8.1 (L) 8.6 - 10.6 mg/dL    Albumin 2.8 (L) 3.4 - 5.0 g/dL    Alkaline Phosphatase 171 (H) 33 - 110 U/L    Total Protein 6.2 (L) 6.4 - 8.2 g/dL    AST 52 (H) 9 - 39 U/L    Bilirubin, Total 2.5 (H) 0.0 - 1.2 mg/dL    ALT 44 7 - 45 U/L   BLOOD GAS VENOUS FULL PANEL   Result Value Ref Range    POCT pH, Venous 7.22 (LL) 7.33 - 7.43 pH    POCT pCO2, Venous 73 (HH) 41 - 51 mm Hg    POCT pO2, Venous 36 35 - 45 mm Hg    POCT SO2, Venous 52 45 - 75 %    POCT Oxy Hemoglobin, Venous 50.9 45.0 - 75.0 %    POCT Hematocrit Calculated, Venous 29.0 (L) 36.0 - 46.0 %    POCT Sodium, Venous 132 (L) 136 - 145 mmol/L    POCT Potassium, Venous 6.2 (HH) 3.5 - 5.3 mmol/L    POCT Chloride, Venous 101 98 - 107 mmol/L    POCT Ionized Calicum, Venous 1.15 1.10 - 1.33 mmol/L    POCT Glucose, Venous 74 74 - 99 mg/dL    POCT Lactate, Venous 1.3 0.4 - 2.0 mmol/L    POCT Base Excess, Venous 1.1 -2.0 - 3.0 mmol/L    POCT HCO3 Calculated, Venous 29.9 (H) 22.0 - 26.0 mmol/L    POCT Hemoglobin, Venous 9.5 (L) 12.0 - 16.0 g/dL    POCT Anion Gap, Venous 7.0 (L) 10.0 - 25.0 mmol/L    Patient Temperature 37.0 degrees Celsius    FiO2 36 %   Manual Differential   Result Value Ref Range    Neutrophils %, Manual 86.1 40.0 - 80.0  %    Lymphocytes %, Manual 8.2 13.0 - 44.0 %    Monocytes %, Manual 5.7 2.0 - 10.0 %    Eosinophils %, Manual 0.0 0.0 - 6.0 %    Basophils %, Manual 0.0 0.0 - 2.0 %    Seg Neutrophils Absolute, Manual 10.16 (H) 1.20 - 7.00 x10*3/uL    Lymphocytes Absolute, Manual 0.97 (L) 1.20 - 4.80 x10*3/uL    Monocytes Absolute, Manual 0.67 0.10 - 1.00 x10*3/uL    Eosinophils Absolute, Manual 0.00 0.00 - 0.70 x10*3/uL    Basophils Absolute, Manual 0.00 0.00 - 0.10 x10*3/uL    Total Cells Counted 122     RBC Morphology See Below     Polychromasia Mild     Target Cells Few     Basophilic Stippling Present     Pappenheimer Bodies Present    POCT GLUCOSE   Result Value Ref Range    POCT Glucose 88 74 - 99 mg/dL   BLOOD GAS ARTERIAL FULL PANEL   Result Value Ref Range    POCT pH, Arterial 7.27 (L) 7.38 - 7.42 pH    POCT pCO2, Arterial 63 (H) 38 - 42 mm Hg    POCT pO2, Arterial 85 85 - 95 mm Hg    POCT SO2, Arterial 98 94 - 100 %    POCT Oxy Hemoglobin, Arterial 94.6 94.0 - 98.0 %    POCT Hematocrit Calculated, Arterial 30.0 (L) 36.0 - 46.0 %    POCT Sodium, Arterial 130 (L) 136 - 145 mmol/L    POCT Potassium, Arterial 5.7 (H) 3.5 - 5.3 mmol/L    POCT Chloride, Arterial 100 98 - 107 mmol/L    POCT Ionized Calcium, Arterial 1.19 1.10 - 1.33 mmol/L    POCT Glucose, Arterial 83 74 - 99 mg/dL    POCT Lactate, Arterial 0.8 0.4 - 2.0 mmol/L    POCT Base Excess, Arterial 1.1 -2.0 - 3.0 mmol/L    POCT HCO3 Calculated, Arterial 28.9 (H) 22.0 - 26.0 mmol/L    POCT Hemoglobin, Arterial 9.9 (L) 12.0 - 16.0 g/dL    POCT Anion Gap, Arterial 7 (L) 10 - 25 mmo/L    Patient Temperature 37.0 degrees Celsius    FiO2 36 %   CBC   Result Value Ref Range    WBC 12.3 (H) 4.4 - 11.3 x10*3/uL    nRBC 7.7 (H) 0.0 - 0.0 /100 WBCs    RBC 2.70 (L) 4.00 - 5.20 x10*6/uL    Hemoglobin 8.1 (L) 12.0 - 16.0 g/dL    Hematocrit 25.1 (L) 36.0 - 46.0 %    MCV 93 80 - 100 fL    MCH 30.0 26.0 - 34.0 pg    MCHC 32.3 32.0 - 36.0 g/dL    RDW 22.1 (H) 11.5 - 14.5 %    Platelets  276 150 - 450 x10*3/uL   Renal function panel   Result Value Ref Range    Glucose 73 (L) 74 - 99 mg/dL    Sodium 135 (L) 136 - 145 mmol/L    Potassium 5.5 (H) 3.5 - 5.3 mmol/L    Chloride 99 98 - 107 mmol/L    Bicarbonate 29 21 - 32 mmol/L    Anion Gap 13 10 - 20 mmol/L    Urea Nitrogen 29 (H) 6 - 23 mg/dL    Creatinine 2.88 (H) 0.50 - 1.05 mg/dL    eGFR 19 (L) >60 mL/min/1.73m*2    Calcium 8.3 (L) 8.6 - 10.6 mg/dL    Phosphorus 5.6 (H) 2.5 - 4.9 mg/dL    Albumin 2.6 (L) 3.4 - 5.0 g/dL     *Note: Due to a large number of results and/or encounters for the requested time period, some results have not been displayed. A complete set of results can be found in Results Review.        ASSESSMENT:  Senait Narvaez is a  54 y.o. 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 Nephrology was previously following for ATN, due to shock requiring pressor support and exchange transfusion. Patient's creatinine peaked at 5.4 but continued to downtrend so Nephrology signed off 9/17. We are now reengaged for new CHARLES.       #CHARLES stage 3 oliguric on CKD II (baseline eGFR 72 - Baseline creatinine 0.8-1)  - s/p IV contrast on 9/10 with CT scan, no recent contrast   - Creatinine during admission: 1.13, (9/26) 1.10 (9/27), 1.64 (9/28),  2.65 (9/29) 2.93   - Repeat today 2.88   - Decrease in weight based on charting       Etiology: Likely prerenal versus ATN given persistent Hypotension over admission, and recent documentation of poor PO intake.     #Hyperkalemia   - Hyperkalemia: K: 5.5 (9/29), likely related to acidosis.     #Respiratory acidosis  - ABG 7.27,  pCO2 63, pO2 85  - HCO3 29 on RFP    Due to respiratory acidosis given PCO2 elevation. Not metabolic as Bicarb is not decreased    RECOMMENDATIONS:  - Check Orthostatics  - 500 ml bolus NS given over 2 hours  -  Consider decrease in metoprolol given soft BP's  - Lokelma 10 g once today  - Strict I/O charting   - Avoid nephrotoxic agents.   - F/U Urinalysis   - F/U urine electrolytes  - F/U RFP          Jessenia Diaz MD  PGY-1 Internal Medicine Resident   Daytime / Weekend Renal Pager [phone]  After 7 pm Emergencies [phone] Pager [phone]

## 2024-09-30 LAB
ALBUMIN SERPL BCP-MCNC: 2.7 G/DL (ref 3.4–5)
ALP SERPL-CCNC: 154 U/L (ref 33–110)
ALT SERPL W P-5'-P-CCNC: 34 U/L (ref 7–45)
ANION GAP BLDA CALCULATED.4IONS-SCNC: 6 MMO/L (ref 10–25)
ANION GAP SERPL CALC-SCNC: 14 MMOL/L (ref 10–20)
AST SERPL W P-5'-P-CCNC: 46 U/L (ref 9–39)
BASE EXCESS BLDA CALC-SCNC: 0.3 MMOL/L (ref -2–3)
BASOPHILS # BLD AUTO: 0.03 X10*3/UL (ref 0–0.1)
BASOPHILS NFR BLD AUTO: 0.3 %
BILIRUB SERPL-MCNC: 2.3 MG/DL (ref 0–1.2)
BNP SERPL-MCNC: 316 PG/ML (ref 0–99)
BODY TEMPERATURE: 37 DEGREES CELSIUS
BUN SERPL-MCNC: 29 MG/DL (ref 6–23)
CA-I BLDA-SCNC: 1.17 MMOL/L (ref 1.1–1.33)
CALCIUM SERPL-MCNC: 8.6 MG/DL (ref 8.6–10.6)
CHLORIDE BLDA-SCNC: 102 MMOL/L (ref 98–107)
CHLORIDE SERPL-SCNC: 101 MMOL/L (ref 98–107)
CO2 SERPL-SCNC: 29 MMOL/L (ref 21–32)
CREAT SERPL-MCNC: 2.37 MG/DL (ref 0.5–1.05)
EGFRCR SERPLBLD CKD-EPI 2021: 24 ML/MIN/1.73M*2
EOSINOPHIL # BLD AUTO: 0.12 X10*3/UL (ref 0–0.7)
EOSINOPHIL NFR BLD AUTO: 1.1 %
ERYTHROCYTE [DISTWIDTH] IN BLOOD BY AUTOMATED COUNT: 21.9 % (ref 11.5–14.5)
GLUCOSE BLDA-MCNC: 86 MG/DL (ref 74–99)
GLUCOSE SERPL-MCNC: 62 MG/DL (ref 74–99)
HCO3 BLDA-SCNC: 28.5 MMOL/L (ref 22–26)
HCT VFR BLD AUTO: 24.8 % (ref 36–46)
HCT VFR BLD EST: 26 % (ref 36–46)
HGB BLD-MCNC: 8 G/DL (ref 12–16)
HGB BLDA-MCNC: 8.5 G/DL (ref 12–16)
HGB RETIC QN: 30 PG (ref 28–38)
HOLD SPECIMEN: NORMAL
IMM GRANULOCYTES # BLD AUTO: 0.09 X10*3/UL (ref 0–0.7)
IMM GRANULOCYTES NFR BLD AUTO: 0.8 % (ref 0–0.9)
IMMATURE RETIC FRACTION: 12.8 %
INHALED O2 CONCENTRATION: 36 %
LACTATE BLDA-SCNC: 0.4 MMOL/L (ref 0.4–2)
LDH SERPL L TO P-CCNC: 273 U/L (ref 84–246)
LYMPHOCYTES # BLD AUTO: 0.58 X10*3/UL (ref 1.2–4.8)
LYMPHOCYTES NFR BLD AUTO: 5.4 %
MCH RBC QN AUTO: 30.4 PG (ref 26–34)
MCHC RBC AUTO-ENTMCNC: 32.3 G/DL (ref 32–36)
MCV RBC AUTO: 94 FL (ref 80–100)
MONOCYTES # BLD AUTO: 1.6 X10*3/UL (ref 0.1–1)
MONOCYTES NFR BLD AUTO: 14.9 %
NEUTROPHILS # BLD AUTO: 8.32 X10*3/UL (ref 1.2–7.7)
NEUTROPHILS NFR BLD AUTO: 77.5 %
NRBC BLD-RTO: 10.8 /100 WBCS (ref 0–0)
OXYHGB MFR BLDA: 90.2 % (ref 94–98)
PAPPENHEIMER BOD BLD QL SMEAR: PRESENT
PCO2 BLDA: 68 MM HG (ref 38–42)
PH BLDA: 7.23 PH (ref 7.38–7.42)
PLATELET # BLD AUTO: 263 X10*3/UL (ref 150–450)
PO2 BLDA: 68 MM HG (ref 85–95)
POLYCHROMASIA BLD QL SMEAR: NORMAL
POTASSIUM BLDA-SCNC: 5.6 MMOL/L (ref 3.5–5.3)
POTASSIUM SERPL-SCNC: 5.1 MMOL/L (ref 3.5–5.3)
PROT SERPL-MCNC: 6.3 G/DL (ref 6.4–8.2)
RBC # BLD AUTO: 2.63 X10*6/UL (ref 4–5.2)
RBC MORPH BLD: NORMAL
RETICS #: 0.36 X10*6/UL (ref 0.02–0.08)
RETICS/RBC NFR AUTO: 13.9 % (ref 0.5–2)
SAO2 % BLDA: 93 % (ref 94–100)
SODIUM BLDA-SCNC: 131 MMOL/L (ref 136–145)
SODIUM SERPL-SCNC: 139 MMOL/L (ref 136–145)
TARGETS BLD QL SMEAR: NORMAL
WBC # BLD AUTO: 10.7 X10*3/UL (ref 4.4–11.3)

## 2024-09-30 PROCEDURE — 80053 COMPREHEN METABOLIC PANEL: CPT

## 2024-09-30 PROCEDURE — 83880 ASSAY OF NATRIURETIC PEPTIDE: CPT

## 2024-09-30 PROCEDURE — 99232 SBSQ HOSP IP/OBS MODERATE 35: CPT

## 2024-09-30 PROCEDURE — 36415 COLL VENOUS BLD VENIPUNCTURE: CPT | Performed by: NURSE PRACTITIONER

## 2024-09-30 PROCEDURE — 2500000002 HC RX 250 W HCPCS SELF ADMINISTERED DRUGS (ALT 637 FOR MEDICARE OP, ALT 636 FOR OP/ED): Performed by: NURSE PRACTITIONER

## 2024-09-30 PROCEDURE — 85025 COMPLETE CBC W/AUTO DIFF WBC: CPT | Performed by: NURSE PRACTITIONER

## 2024-09-30 PROCEDURE — 99233 SBSQ HOSP IP/OBS HIGH 50: CPT

## 2024-09-30 PROCEDURE — 2500000001 HC RX 250 WO HCPCS SELF ADMINISTERED DRUGS (ALT 637 FOR MEDICARE OP)

## 2024-09-30 PROCEDURE — 2500000005 HC RX 250 GENERAL PHARMACY W/O HCPCS: Performed by: NURSE PRACTITIONER

## 2024-09-30 PROCEDURE — 94660 CPAP INITIATION&MGMT: CPT

## 2024-09-30 PROCEDURE — 1170000001 HC PRIVATE ONCOLOGY ROOM DAILY

## 2024-09-30 PROCEDURE — 97530 THERAPEUTIC ACTIVITIES: CPT | Mod: GO | Performed by: OCCUPATIONAL THERAPIST

## 2024-09-30 PROCEDURE — 85045 AUTOMATED RETICULOCYTE COUNT: CPT

## 2024-09-30 PROCEDURE — 83615 LACTATE (LD) (LDH) ENZYME: CPT

## 2024-09-30 PROCEDURE — 2500000004 HC RX 250 GENERAL PHARMACY W/ HCPCS (ALT 636 FOR OP/ED)

## 2024-09-30 PROCEDURE — 97535 SELF CARE MNGMENT TRAINING: CPT | Mod: GO,59 | Performed by: OCCUPATIONAL THERAPIST

## 2024-09-30 ASSESSMENT — PAIN SCALES - GENERAL
PAINLEVEL_OUTOF10: 5 - MODERATE PAIN
PAINLEVEL_OUTOF10: 5 - MODERATE PAIN

## 2024-09-30 ASSESSMENT — COGNITIVE AND FUNCTIONAL STATUS - GENERAL
MOVING FROM LYING ON BACK TO SITTING ON SIDE OF FLAT BED WITH BEDRAILS: A LOT
EATING MEALS: A LOT
PERSONAL GROOMING: A LOT
STANDING UP FROM CHAIR USING ARMS: A LOT
TURNING FROM BACK TO SIDE WHILE IN FLAT BAD: A LOT
MOBILITY SCORE: 11
MOBILITY SCORE: 11
EATING MEALS: TOTAL
TOILETING: A LOT
DAILY ACTIVITIY SCORE: 8
DRESSING REGULAR LOWER BODY CLOTHING: A LOT
WALKING IN HOSPITAL ROOM: A LOT
DRESSING REGULAR UPPER BODY CLOTHING: A LOT
PERSONAL GROOMING: A LOT
CLIMB 3 TO 5 STEPS WITH RAILING: TOTAL
DRESSING REGULAR LOWER BODY CLOTHING: TOTAL
TURNING FROM BACK TO SIDE WHILE IN FLAT BAD: A LOT
DAILY ACTIVITIY SCORE: 12
DAILY ACTIVITIY SCORE: 12
DRESSING REGULAR LOWER BODY CLOTHING: A LOT
TOILETING: A LOT
CLIMB 3 TO 5 STEPS WITH RAILING: TOTAL
MOVING TO AND FROM BED TO CHAIR: A LOT
STANDING UP FROM CHAIR USING ARMS: A LOT
HELP NEEDED FOR BATHING: A LOT
TOILETING: TOTAL
DRESSING REGULAR UPPER BODY CLOTHING: A LOT
MOVING TO AND FROM BED TO CHAIR: A LOT
WALKING IN HOSPITAL ROOM: A LOT
HELP NEEDED FOR BATHING: TOTAL
DRESSING REGULAR UPPER BODY CLOTHING: A LOT
PERSONAL GROOMING: A LOT
EATING MEALS: A LOT
MOVING FROM LYING ON BACK TO SITTING ON SIDE OF FLAT BED WITH BEDRAILS: A LOT
HELP NEEDED FOR BATHING: A LOT

## 2024-09-30 ASSESSMENT — PAIN - FUNCTIONAL ASSESSMENT
PAIN_FUNCTIONAL_ASSESSMENT: 0-10
PAIN_FUNCTIONAL_ASSESSMENT: UNABLE TO SELF-REPORT

## 2024-09-30 ASSESSMENT — ACTIVITIES OF DAILY LIVING (ADL): HOME_MANAGEMENT_TIME_ENTRY: 34

## 2024-09-30 ASSESSMENT — PAIN SCALES - PAIN ASSESSMENT IN ADVANCED DEMENTIA (PAINAD)
BODYLANGUAGE: RELAXED
BREATHING: NORMAL
TOTALSCORE: 0
CONSOLABILITY: NO NEED TO CONSOLE
FACIALEXPRESSION: SMILING OR INEXPRESSIVE
BODYLANGUAGE: RELAXED
FACIALEXPRESSION: SMILING OR INEXPRESSIVE
TOTALSCORE: 0
BREATHING: NORMAL
CONSOLABILITY: NO NEED TO CONSOLE

## 2024-09-30 NOTE — PROGRESS NOTES
Senait Narvaez   54 y.o.      MRN/Room: [id number]/4024/4024-A    Subjective: Patient with slight improvement in mentation today. Yesterday she only responded with yes or no to questioning, today she responds in short phrases.     Objective:     Meds:   folic acid, 1 mg, Daily  [Held by provider] metoprolol tartrate, 12.5 mg, BID  nystatin, 1 Application, BID  [Held by provider] oxyCODONE ER, 10 mg, q12h BEATA  oxygen, , Continuous - Inhalation  pantoprazole, 40 mg, Daily  polyethylene glycol, 17 g, BID  sennosides, 2 tablet, BID  thiamine, 100 mg, Daily  triamcinolone, , BID  [Held by provider] warfarin, 5 mg, Daily  white petrolatum, , BID         acetaminophen, 975 mg, q12h PRN  albuterol, 2.5 mg, q6h PRN  alteplase, 2 mg, PRN  bisacodyl, 10 mg, Daily PRN  dextrose, 12.5 g, q15 min PRN  dextrose, 25 g, q15 min PRN  glucagon, 1 mg, q15 min PRN  glucagon, 1 mg, q15 min PRN  naloxone, 0.4 mg, q5 min PRN  ondansetron, 4 mg, q8h PRN  [Held by provider] oxyCODONE, 20 mg, q4h PRN  oxygen, , Continuous PRN - O2/gases  sodium chloride, 1 spray, 4x daily PRN        Vitals:    09/30/24 1241   BP: 104/68   Pulse: 101   Resp: 16   Temp: 36.8 °C (98.2 °F)   SpO2: 97%          Intake/Output Summary (Last 24 hours) at 9/30/2024 1340  Last data filed at 9/29/2024 1835  Gross per 24 hour   Intake 500 ml   Output --   Net 500 ml       General appearance: no distress  Eyes: non-icteric  Heart: Regular, Rate and rhythm   Lungs: CTA bilat No wheezing/crackles  Abdomen: soft, nt/nd  Extremities: Trace edema bilateral lower extremities, SCD's in place   No Slater  Neuro: Responding to questions in short phrases. AxOx2       Blood Labs:  Results for orders placed or performed during the hospital encounter of 09/10/24 (from the past 24 hour(s))   Protime-INR   Result Value Ref Range    Protime 46.0 (H) 9.8 - 12.8 seconds    INR 4.0 (H) 0.9 - 1.1   Lactate dehydrogenase   Result Value Ref Range     (H) 84 - 246 U/L   Reticulocytes   Result  Value Ref Range    Retic % 13.9 (H) 0.5 - 2.0 %    Retic Absolute 0.365 (H) 0.018 - 0.083 x10*6/uL    Reticulocyte Hemoglobin 30 28 - 38 pg    Immature Retic fraction 12.8 <=16.0 %   CBC and Auto Differential   Result Value Ref Range    WBC 10.7 4.4 - 11.3 x10*3/uL    nRBC 10.8 (H) 0.0 - 0.0 /100 WBCs    RBC 2.63 (L) 4.00 - 5.20 x10*6/uL    Hemoglobin 8.0 (L) 12.0 - 16.0 g/dL    Hematocrit 24.8 (L) 36.0 - 46.0 %    MCV 94 80 - 100 fL    MCH 30.4 26.0 - 34.0 pg    MCHC 32.3 32.0 - 36.0 g/dL    RDW 21.9 (H) 11.5 - 14.5 %    Platelets 263 150 - 450 x10*3/uL    Neutrophils % 77.5 40.0 - 80.0 %    Immature Granulocytes %, Automated 0.8 0.0 - 0.9 %    Lymphocytes % 5.4 13.0 - 44.0 %    Monocytes % 14.9 2.0 - 10.0 %    Eosinophils % 1.1 0.0 - 6.0 %    Basophils % 0.3 0.0 - 2.0 %    Neutrophils Absolute 8.32 (H) 1.20 - 7.70 x10*3/uL    Immature Granulocytes Absolute, Automated 0.09 0.00 - 0.70 x10*3/uL    Lymphocytes Absolute 0.58 (L) 1.20 - 4.80 x10*3/uL    Monocytes Absolute 1.60 (H) 0.10 - 1.00 x10*3/uL    Eosinophils Absolute 0.12 0.00 - 0.70 x10*3/uL    Basophils Absolute 0.03 0.00 - 0.10 x10*3/uL   Comprehensive metabolic panel   Result Value Ref Range    Glucose 62 (L) 74 - 99 mg/dL    Sodium 139 136 - 145 mmol/L    Potassium 5.1 3.5 - 5.3 mmol/L    Chloride 101 98 - 107 mmol/L    Bicarbonate 29 21 - 32 mmol/L    Anion Gap 14 10 - 20 mmol/L    Urea Nitrogen 29 (H) 6 - 23 mg/dL    Creatinine 2.37 (H) 0.50 - 1.05 mg/dL    eGFR 24 (L) >60 mL/min/1.73m*2    Calcium 8.6 8.6 - 10.6 mg/dL    Albumin 2.7 (L) 3.4 - 5.0 g/dL    Alkaline Phosphatase 154 (H) 33 - 110 U/L    Total Protein 6.3 (L) 6.4 - 8.2 g/dL    AST 46 (H) 9 - 39 U/L    Bilirubin, Total 2.3 (H) 0.0 - 1.2 mg/dL    ALT 34 7 - 45 U/L   Morphology   Result Value Ref Range    RBC Morphology See Below     Polychromasia Mild     Target Cells Many     Pappenheimer Bodies Present      *Note: Due to a large number of results and/or encounters for the requested time  period, some results have not been displayed. A complete set of results can be found in Results Review.        ASSESSMENT:  Senait Narvaez is a  54 y.o. 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 Nephrology was previously following for ATN, due to shock requiring pressor support and exchange transfusion. Patient's creatinine peaked at 5.4 but continued to downtrend so Nephrology signed off 9/17. We are now reengaged for new CHARLES improving after fluid bolus.       #CHARLES stage 3 oliguric on CKD II (baseline eGFR 72 - Baseline creatinine 0.8-1)  :: FeNA 0.4%, Urine Na 22, K 33, Cl 18, Cr 127.8   :: s/p IV contrast on 9/10 with CT scan, no recent contrast   :: Creatinine during admission: 1.13, (9/26) 1.10 (9/27), 1.64 (9/28),  2.65 (9/29) 2.93   Repeat 2.88 (9/29)   :: Creatinine 2. 37 today   :: S/p 500 ml NS bolus     Etiology: Likely prerenal versus ATN given persistent Hypotension over admission, and recent documentation of poor PO intake.     #UTI   :: Urinalysis Leukocyte esterase positive, 21-50 WBC's, >20 RBC's     #Hyperkalemia resolving   - Hyperkalemia: K: 5.5 (9/29) --> 5.1 after 1 dose of 10g Lokelma     #Respiratory acidosis  - ABG 7.27, pCO2 63, pO2 85  - HCO3 29 on RFP    Due to respiratory acidosis given PCO2 elevation. Not metabolic as Bicarb is not decreased    RECOMMENDATIONS:  - Try to ovoid oxycodone as analgesic as there is some renal excretion. Can try IV dilaudid or fentanyl.   - Monitor RFP   - Strict I/O charting   - Avoid nephrotoxic agents.           Jessenia Diaz MD  PGY-1 Internal Medicine Resident    Daytime / Weekend Renal Pager [phone]  After 7 pm Emergencies [phone] Pager [phone]

## 2024-09-30 NOTE — PROGRESS NOTES
09/24/24 1300   Discharge Planning   Who is requesting discharge planning? Patient   Home or Post Acute Services Post acute facilities (Rehab/SNF/etc)   Type of Post Acute Facility Services Skilled nursing   Expected Discharge Disposition SNF     Care Transitions Note  09/24/24  Met with Senait and mother Tati ([phone]) at bedside to discuss discharge planning. [facility] is able to accept as long as patient is off IV pain meds. Team aware, plan was to wean off IV dilaudid tomorrow per rounds this morning. Will send updated clinicals from today.    Senait and Tati voiced concerns about Senait's rash. Tati would like the rash to improve before going to SNF. Tati states Dr. Whaley plans to check in with them. This LSW sent message to [facility] outpatient SW to update her. Tati had questions about SNF providing injections or oral pain meds, LSW sent message to [facility] for more info. Mother also adds that Senait needs to be admitted for exchanges every 3-4 weeks, wonders how this would be handled if still at SNF. LSW will update team and follow for needs that arise.   YULI Zamorano     UPDATE 09/27/24  Per medical team, Senait's rash is stable and improving, although not totally better. They discussed with Senait and Tati that it will take time to heal. She has weaned off IV pain meds, now on oxy. Plan to go to SNF on Monday. LSW to start pre-cert for [facility].     UPDATE 09/30/24  Per team, patient is not medically ready. Pre-cert was not started for [facility], updates sent to facility. Following for discharge needs as patient improves.  YULI Zamorano

## 2024-09-30 NOTE — PROGRESS NOTES
"Senait Narvaez is a 54 y.o. female on day 20 of admission presenting with Sickle cell disease with crisis and other complication.    Subjective   Seen pt at bedside this morning. Pt verbally responsive to name, and responsive to touch. Pt did know her name, but was unable to answer questions with more than a yes or no. Pt c/o no pain on assessment. Discussed with her plans to wait for labs to result, and continue to monitor respiratory and renal function. Denies fever/chills, N/V/D/C, bleeding, dizziness, H/A, SOB or vision changes.     Objective     Physical Exam  Constitutional:       Appearance: She is obese.   HENT:      Head: Normocephalic and atraumatic.      Mouth/Throat:      Mouth: Mucous membranes are moist.   Eyes:      Extraocular Movements: Extraocular movements intact.   Cardiovascular:      Rate and Rhythm: Normal rate and regular rhythm.      Pulses: Normal pulses.      Heart sounds: Normal heart sounds.   Pulmonary:      Breath sounds: Normal breath sounds.   Abdominal:      General: Bowel sounds are normal.      Palpations: Abdomen is soft.   Musculoskeletal:         General: Normal range of motion.      Cervical back: Normal range of motion.   Skin:     General: Skin is warm and dry.      Findings: Lesion and rash present.   Neurological:      Comments: Pt A&Ox1, responsive to name and physical touch.        Last Recorded Vitals  Blood pressure 120/69, pulse 101, temperature 37.3 °C (99.1 °F), temperature source Temporal, resp. rate 16, height 1.651 m (5' 5\"), weight 120 kg (264 lb 12.4 oz), SpO2 97%.  Intake/Output last 3 Shifts:  I/O last 3 completed shifts:  In: 500 (4.2 mL/kg) [IV Piggyback:500]  Out: 200 (1.7 mL/kg) [Urine:200 (0 mL/kg/hr)]  Weight: 120.1 kg     Relevant Results  Scheduled medications  folic acid, 1 mg, oral, Daily  [Held by provider] metoprolol tartrate, 12.5 mg, oral, BID  nystatin, 1 Application, Topical, BID  [Held by provider] oxyCODONE ER, 10 mg, oral, q12h BEATA  oxygen, , " inhalation, Continuous - Inhalation  pantoprazole, 40 mg, intravenous, Daily  polyethylene glycol, 17 g, oral, BID  sennosides, 2 tablet, oral, BID  thiamine, 100 mg, intravenous, Daily  triamcinolone, , Topical, BID  [Held by provider] warfarin, 5 mg, oral, Daily  white petrolatum, , Topical, BID      Continuous medications     PRN medications  PRN medications: acetaminophen, albuterol, alteplase, bisacodyl, dextrose, dextrose, glucagon, glucagon, naloxone, ondansetron, [Held by provider] oxyCODONE, oxygen, sodium chloride   Results for orders placed or performed during the hospital encounter of 09/10/24 (from the past 24 hour(s))   Lactate dehydrogenase   Result Value Ref Range     (H) 84 - 246 U/L   Reticulocytes   Result Value Ref Range    Retic % 13.9 (H) 0.5 - 2.0 %    Retic Absolute 0.365 (H) 0.018 - 0.083 x10*6/uL    Reticulocyte Hemoglobin 30 28 - 38 pg    Immature Retic fraction 12.8 <=16.0 %   CBC and Auto Differential   Result Value Ref Range    WBC 10.7 4.4 - 11.3 x10*3/uL    nRBC 10.8 (H) 0.0 - 0.0 /100 WBCs    RBC 2.63 (L) 4.00 - 5.20 x10*6/uL    Hemoglobin 8.0 (L) 12.0 - 16.0 g/dL    Hematocrit 24.8 (L) 36.0 - 46.0 %    MCV 94 80 - 100 fL    MCH 30.4 26.0 - 34.0 pg    MCHC 32.3 32.0 - 36.0 g/dL    RDW 21.9 (H) 11.5 - 14.5 %    Platelets 263 150 - 450 x10*3/uL    Neutrophils % 77.5 40.0 - 80.0 %    Immature Granulocytes %, Automated 0.8 0.0 - 0.9 %    Lymphocytes % 5.4 13.0 - 44.0 %    Monocytes % 14.9 2.0 - 10.0 %    Eosinophils % 1.1 0.0 - 6.0 %    Basophils % 0.3 0.0 - 2.0 %    Neutrophils Absolute 8.32 (H) 1.20 - 7.70 x10*3/uL    Immature Granulocytes Absolute, Automated 0.09 0.00 - 0.70 x10*3/uL    Lymphocytes Absolute 0.58 (L) 1.20 - 4.80 x10*3/uL    Monocytes Absolute 1.60 (H) 0.10 - 1.00 x10*3/uL    Eosinophils Absolute 0.12 0.00 - 0.70 x10*3/uL    Basophils Absolute 0.03 0.00 - 0.10 x10*3/uL   Comprehensive metabolic panel   Result Value Ref Range    Glucose 62 (L) 74 - 99 mg/dL     Sodium 139 136 - 145 mmol/L    Potassium 5.1 3.5 - 5.3 mmol/L    Chloride 101 98 - 107 mmol/L    Bicarbonate 29 21 - 32 mmol/L    Anion Gap 14 10 - 20 mmol/L    Urea Nitrogen 29 (H) 6 - 23 mg/dL    Creatinine 2.37 (H) 0.50 - 1.05 mg/dL    eGFR 24 (L) >60 mL/min/1.73m*2    Calcium 8.6 8.6 - 10.6 mg/dL    Albumin 2.7 (L) 3.4 - 5.0 g/dL    Alkaline Phosphatase 154 (H) 33 - 110 U/L    Total Protein 6.3 (L) 6.4 - 8.2 g/dL    AST 46 (H) 9 - 39 U/L    Bilirubin, Total 2.3 (H) 0.0 - 1.2 mg/dL    ALT 34 7 - 45 U/L   Morphology   Result Value Ref Range    RBC Morphology See Below     Polychromasia Mild     Target Cells Many     Pappenheimer Bodies Present    B-type natriuretic peptide   Result Value Ref Range     (H) 0 - 99 pg/mL     *Note: Due to a large number of results and/or encounters for the requested time period, some results have not been displayed. A complete set of results can be found in Results Review.               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 Patient was subsequently intubated due to worsening lethargic and lack of respiratory compensation from significant metabolic acidosis with concern for ACS. Vancomycin and Zosyn started for empiric coverage, patient also started on pressors for BP support. Patient was transfused 2 units pRBCs and then underwent RBCEx on 9/11. Cardiology consulted for c/f NSTEMI vs demand ischemia (ST depressions and elevated troponin), rec treat as ACS. Course further c/b severe CHARLES with  peak sCr of 5.4, and acute liver injury with severely elevated liver enzymes (ALT 4119, AST >10k). Liver US only remarkable for fatty infiltration, viral hepatitis panel negative. CHARLES and liver injury improved with supportive management. Pruritic rash noted 9/14, Derm consulted and took multiple biopsies 9/15. Rash then began to worsen to involve the groin, lateral thigh, and scalp with numerous tense bullae. Discontinued heparin given concerns for potential HIT, started bivalirudin. Patient was extubated on 9/16/2024. Started stress dose steroids given continued pressor requirements, pressors Dc'd 9/17. Patient transferred to [facility] 9/17, PT/OT rec SNF. Coumadin bridge was started 9/18. Given persistent rash with unremarkable labs, derm re-biopsied on 9/18, findings ultimately felt to be most c/w fixed drug eruption. Dermatology contacted for bx results (9/20) rec triamcinolone cream BID and interdry cloths. Leukocytosis uptrending 9/21, repeat infectious workup negative (CXR, Abd XR, blood cultures, MRSA negative 9/21). UA with leuks, urine culture pending (9/21). On 9/25, per nursing and patient, rash seems to be worsening. Derm re-engaged recommended to apply vasoline to all eroded/denuded areas and continue triamcinolone cream to both the black plaques as well as the red areas across the trunk and thighs. 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 Wound care following. For warfarin dosing, per pharmacy, if INR between 2-3 can restart home warfarin 5mg daily, if INR < 2 restart bival. INR 3.4 on 9/25, warfarin stopped, repeat INR 2.8 9/26 and warfarin restarted. On 9/26, increased PO oxycodone and discontinued IV dilaudid in anticipation for discharge. Overnight on 9/29, brain attack called out of concern for change in mental status, no focal deficits noted, BAT cancelled and narcan administered x3 with improvement  of mental status. As of 9/30, pt still only A&Ox1 and only using one worded answers to questions. Opioids still held (9/30); continue to monitor improvements in pt mental and renal function. INR 4.0 (9/30), Coumadin held for 9/30, repeat coag pending for 10/1. DC pending improved pain and improvement of skin.    #Waxing and waning AMS  - felt to be due to opioids i/s/o worsening renal function and c/b respiratory acidosis  - all opioids on hold (9/28- )  - keep supplemental O2 on board, but titrate to maintain SPO2 of 90-92% at most  - on CPAP at home, enforce nightly use to extent possible; may need BiPAP if unresolved    # CHARLES on CKD II, improving  - Baseline ~ SCr <2, peak 5.4 this admission. 9/21 sCr 1.58 --> 1.13 (9/25)   - likely pre-renal vs ATN given persistant hypotension over admission, on CKD II, now polyuric phase  - FeNA 0.4%  - Severe hyperkalemia with previous peaked T waves on EKG, resolved  - Nephrology following  - s/p multiple K cocktails in MICU  - encourage increased PO intake     #Purpuric rash, suspected fixed drug eruption  #Concern for possible heparin-induced thrombocytopenia (HIT) -resolved  - Purple, reticular pattern of plaques noted around the bilateral breasts, scalp and groin,  with tense bullae. Non-erythematous, no purulence  - Some initial concern for possible heparin-induced thrombocytopenia with concurrent skin findings. Discontinued heparin 9/16 and transitioned to bivalirudin  - 4 T score 0, PF4 w/ reflex BINA negative. Hematology ultimately consulted, felt there was low concern for HIT and signed off  - Differential diagnosis includes purpura secondary to infection versus coagulopathy versus vasculopathy versus small and/or medium vessel vasculitis versus calciphylaxis vs drug rash  - Dermatology consulted, work-up to date:  - Low Protein C activity  - ANCA, PR3 and MPO negative  - ISABELLE neg  - cryoglobulin labs- not enough specimen to analyze  - S/p skin punch biopsies x 2 9/15  and one on 9/18, showed SUPERFICIAL EPIDERMAL DEGENERATION WITH ERYTHROCYTE EXTRAVASATION WITH NEUTROPHILS. These findings could be seen secondary to an older trauma, or resolving erythema multiforme, a fixed drug eruption, Edgar-Christopher syndrome, or toxic epidermal necrolysis. Favor Multifocal Fixed Drug Eruption    - Dermatology contacted (9/20) about results, recommended adding vancomycin to allergy list and starting triamcinolone  - Continue triamcinolone 0.1% cream BID (9/20-current) per derm recs  - Plan for left breat suture removal around 9/25-9/27 (per derm, sutures from flank were dissolvable)  - On 9/25, concern from pt and nursing that rash is not improving. Under breasts and groin still causing pain. Bilateral upper thighs and forehead rash improving   - Wound care re-consulted 9/25   - Re-engaged dermatology on 9/25 given worsening rash; recommended to apply vasoline to all eroded/denuded areas and continue triamcinolone cream to both the black plaques as well as the red areas across the trunk and thighs. Per dermatology, erythema is mild and not palpable, it is most likely part of the drug eruption the patient has previously been known to have     #Leukocytosis , improving  - Likely I/s/o rash vs reactive vs infectious process  - leukocytosis noted on admission to MICU (WBC 24.8) --> 12.7 (9/25) --> 9.8   - leukocytosis improved throughout hospital stay however started uptrending 9/20; now improving on 9/25   - Blood cultures 9/10, 9/12, 9/15 all NGTD  - Strep and Legionella Ag, MRSA nares negative  - CXR (9/21) largely unremarkable, again re-demonstrated perihilar prominence seen on prior XR  - Abdominal xray (9/21) unremarkable  - Repeat blood cultures x 2 (9/21) NGTD  - UA (9/21) with 500 LE, urine cx pending  - UA (9/30) +bacteria, +leuks, urine culture pending (9/29)    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  # ACS  - Previously managed through routine RBC exchanges q6 weeks, most recent RBCEx 3/1/24,  per patient pausing on transfusions for time being  - OARRS reviewed, no aberrant behavior noted  - LDH>12,000 (now downtrending), haptoglobin <30, fibrinogen 318, retic % 5.2 on presentation, bili 10.7  - leukocytosis 24.8 on admission to MICU  - CXR (9/10) persistent atelectasis/scarring in the left lower lung  - CT CAP (9/11) Diffuse mosaic attenuation of the lung parenchyma, which can be seen in the setting of small airway disease, pulmonary edema or acute infection  - intubated upon arrival to MICU, now s/p extubation 9/16  - started on vanc and zosyn (finished both courses prior to floor transfer)  - Procal elevated 7.09 (9/11)  - Blood cultures 9/10, 9/12, 9/15 all NGTD  - Strep and Legionella Ag, MRSA nares negative  - trop peak 1431, cards rec treat as ACS (see below)  - [facility]  - S/p exchange transfusion 9/11 AM for ACS  - Care plan from 12/6/23- For mild to moderate pain: Dilaudid 0.5mg IVP q3h as needed, for moderate to severe pain Dilaudid 1- 1.5mg q3h PRN  - On arrival to [facility], started on 0.6mg dilaudid IVP q3h PRN severe pain   - s/p 0.6mg dilaudid IVP q4h PRN breakthrough and 15mg PO oxycodone Q4 hrs severe pain (9/25)  - Per discussion with Dr. Whaley (9/25), okay to increase PO oxycodone to 15-20mg Q4hr to optimize pain for discharge to SNF.   - On 9/26, discontinued IV dilaudid and increased to 20mg PO oxycodone q4hrs for severe pain   - Bowel regimen for opioid induced constipation, zofran for opioid induced nausea, and benadrly for opioid induced pruritus  - Opioids on hold as of 9/28  - c/w home Duloxetine 40mg daily, PO Zofran PRN, Folic Acid 1mg daily, and MVI daily     # ST depression with tropinemia, suspected 2/2 demand ischemia iso ACS  # Hx SVT  - Elevated troponins on admission, peak 1431 and now downtrending   - EKG with ST depressions, likely Type II MI due to vaso-occlusive crisis and anemia  - TTE 9/11: EF 60-65%, RV enlarged and reduced in function which  appears stable when compared to last TTE (1/2024)  - Troponin leak is likely due to cardiac demand vs supply mismatch in the setting of worsening anemia and vaso-occlusive crisis  - Cardiology consulted, now signed off with indication to treat troponemia as ACS  - Lasix held in MICU 2/2 hypotension and CHARLES, can resume as able   - Follow-up with Cardiology outpt, FUV 2/13/25     # Acute liver injury, improving  # Direct hyperbilirubinuria, improving  - Suspected 2/2 hemodynamic instability versus sickle cell crisis  - On presentation to  MICU ALT 2611, AST 4727, T bili 10.2, INR 3.0; continues to improve  - CT with hepatic venous congestion, patient lacks gallbladder  - Liver US with Doppler: Diffuse fatty infiltration, unremarkable doppler interrogation of the liver  - EBV IgG positive, IgM negative  - CMV IgG positive, PCR negative  - Acute hepatitis panel unremarkable  - Hepatology initially following, now signed off  - Will continue to monitor, trend daily CMP     # pHTN   - Initial c/f CTEPH as imaging findings with dilated PA, filling defects, and mosaicism  - VQ scan (6/13/23) with non anatomical basal defects and RUL defect due to scar--> not favoring CTEPH per Dr. Yi and Dr. 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 On lifelong anticoagulation, DOAC discouraged due to high risk of clotting   - home Coumadin 5mg  daily initially HELD on admission to MICU 2/2 unstable course  - restarted coumadin 9/18 PM, will plan to bridge with bival, Ok'd by heme   - 9/20 INR 3.8, bival and home warfarin held. Pharmacy rec repeat INR 4 hours after bival was held. If the INR < 2, we can restart the bival, but if the INR 2-3, we can continue with warfarin monotherapy   - 9/20 repeat INR 3.7, pharmacy rec continuing to hold both medications  - 9/21 INR 3.2, per pharmacy - get repeat INR in AM 9/22, if INR between 2-3 can restart home warfarin 5mg daily  -INR 2.6 (9/24) after restarting Warfarin 5mg, will maintain INR goal of 2-3  - INR 3.4 (9/25). Held warfarin on 9/25. Repeat INR 9/26. Plan to restart if <3   - INR 2.8 (9/26), restarted warfarin, recheck INR 9/27   - INR 4.0 (9/30), held warfarin, repeat INR 10/1 ordered, plan to restart if <3  - Follows with Coumadin clinic outpatient  - Plan to consult vascular medicine if INR is not therapeutic on home regimen      # CAN  - cont home CPAP   - cont home Albuterol PRN     # H/O Cauda Equine syndrome  - Follows Dr. Delacruz as outpatient  - no current issues      # DISPO  - Full code- confirmed on admission  - NOK: Tati Salgado (mother) (#[phone])  - PT/OT rec SNF, plan for rehab at [facility]  - FUV PCP 10/1, Anim 10/7, Cardiology 2/13     Assessment and plan discussed with attending physician Dr. Canchola.    I spent >60 minutes in the professional and overall care of this patient.    Leanna Steven, APRN-CNP

## 2024-09-30 NOTE — PROGRESS NOTES
Occupational Therapy    Occupational Therapy Treatment    Name: Senait Narvaez  MRN: [id number]  : 1970  Date: 24  Room: [address] [place], [place] [address]      Time Calculation  Start Time: 900  Stop Time: 946  Time Calculation (min): 46 min    Assessment:  OT Assessment: Patient has demonstrated decline in performance since initial evaluation. She has had multiple rapid response calls over the last few days and demonstrates decline in cognition/communication in addition to functional performance.  MD aware and has been working patient up.  Patient would benefit from continued OT tx.  Goals may need adjusted in future session, if altered mental status continues.  Plan:  Treatment Interventions: ADL retraining, Visual perceptual retraining, Functional transfer training, UE strengthening/ROM, Endurance training, Cognitive reorientation, Patient/family training, Neuromuscular reeducation, Fine motor coordination activities, UE splinting, Equipment evaluation/education, Compensatory technique education  OT Frequency: 3 times per week  OT Discharge Recommendations: Moderate intensity level of continued care (hopeful progression to LOW)  Equipment Recommended upon Discharge: Wheeled walker  OT Recommended Transfer Status: Assist of 1  OT - OK to Discharge: Yes (when medically appropriate)    Subjective   General:  OT Last Visit  OT Received On: 24  Prior to Session Communication: Bedside nurse  Patient Position Received: Bed, 3 rail up, Alarm on   Precautions:  Hearing/Visual Limitations: Hearing and vision WFL  Medical Precautions: Fall precautions, Oxygen therapy device and L/min  Vitals:  Vital Signs (Past 2hrs)                  Cognition:  Overall Cognitive Status:  (Patient alert, but with inconsistent response to commands and inconsistent answering of questions.)  Orientation Level:  (oriented to self only)  Following Commands: Follows one step commands with repetition (approximately 30% of the time)  Processing Speed:  "Delayed    4AT  Alertness    xNormal (fully alert, but not agitated, throughout assessment) 0          Mild sleepiness for <10 seconds after waking, then normal 0  Clearly abnormal 4  Score: 0    AMT4  Age, date of birth, place (name of the hospital or building), current year  No mistakes 0  1 mistake 1  x2 or more mistakes/untestable 2  Score: 2    Attention  Months of the year backwards Achieves   7 months or more correctly 0  Starts but scores <7 months / refuses to start 1  xUntestable (cannot start because unwell, drowsy, inattentive) 2  Score: 2    Acute change or fluctuating course  Evidence of significant change or fluctuation in: alertness, cognition, other mental function  (eg. paranoia, hallucinations) arising over the last 2 weeks and still evident in last 24hrs  No 0  xYes 4  Score: 4    Total score: 8  4 or above: possible delirium +/- cognitive impairment  1-3: possible cognitive impairment  0: delirium or severe cognitive impairment unlikely (but  delirium still possible if [4] information incomplete)   Pain Assessment:  Pain Assessment  Pain Assessment:  (Patient unable to rate pain, but stated, \"ouch\" with movement of LEs.)     Objective   Activities of Daily Living:        Grooming  Grooming Level of Assistance:  (oral care- patient required total assist to open toothpaste, apply toothpaste to toothbrush, after hand over hand initiation, she was able to use toothbrush to scrub teeth with max cues, she required max assist to use spit basin)  Grooming Comments: face washing with hand over hand initiation with mod assist overall       LE Dressing  Sock Level of Assistance: Dependent    Toileting  Toileting Level of Assistance: Dependent (of 2)  Toileting Comments: Patient held onto rail with UEs with cueing, but did not assist wtih hygiene or clothing manipulation         Bed Mobility/Transfers:   Bed Mobility  Bed Mobility: Yes  Bed Mobility 1  Bed Mobility 1: Rolling right, Rolling left  Level of " Assistance 1: Maximum assistance (of 2)  Bed Mobility 2  Bed Mobility  2: Supine to sitting, Sitting to supine  Level of Assistance 2: Maximum assistance (of 2)  Bed Mobility Comments 2: elevated HOB      Balance:  Static Sitting Balance  Static Sitting-Level of Assistance: Close supervision (<>CGA)  Static Sitting-Comment/Number of Minutes: left lean present, Patient sat edge of bed approximately 10 minutes       Therapy/Activity:      Therapeutic Activity  Therapeutic Activity 1: attempted targeted reaching with UE, patient completed 3 high 5s  but kept UEs close to body, she made no attempts to reach forward          Cognitive Skill Development:  Cognitive Skill Development  Cognitive Skill Development Activity 1: Provided re-orientation to month, year, location multiple times throughout session.  Cognitive Skill Development Activity 1: Provided re-orientation to month, year, location multiple times throughout session.    Outcome Measures:  [facility] Daily Activity  Putting on and taking off regular lower body clothing: Total  Bathing (including washing, rinsing, drying): Total  Putting on and taking off regular upper body clothing: A lot  Toileting, which includes using toilet, bedpan or urinal: Total  Taking care of personal grooming such as brushing teeth: A lot  Eating Meals: Total  Daily Activity - Total Score: 8  OT Adult Other Outcome Measures  4AT: 8- possible delirium +/- cognitive deficit     Education Documentation  Body Mechanics, taught by Araceli Krause OT at 9/30/2024 10:14 AM.  Learner: Patient  Readiness: Acceptance  Method: Explanation  Response: Needs Reinforcement    ADL Training, taught by Araceli Krause OT at 9/30/2024 10:14 AM.  Learner: Patient  Readiness: Acceptance  Method: Explanation  Response: Needs Reinforcement    Education Comments  No comments found.        Goals:  Encounter Problems       Encounter Problems (Active)       ADLs       Patient will complete toileting with SBA and min  cues.   (Not Progressing)       Start:  09/17/24    Expected End:  10/01/24            Patient will complete LB dressing with SBA and min cues.   (Not Progressing)       Start:  09/17/24    Expected End:  10/01/24            Patient will complete UB dressing with set up assist.   (Not Progressing)       Start:  09/17/24    Expected End:  10/01/24            Patient will complete grooming with set up assist.   (Not Progressing)       Start:  09/17/24    Expected End:  10/01/24                   BALANCE       Patient will demo standing balance for at least 5 min with SBA in prep for standing ADLs. (Not Progressing)       Start:  09/17/24    Expected End:  10/01/24               COGNITION/SAFETY       Patient will score WNL on cognitive assessment. (Not Progressing)       Start:  09/17/24    Expected End:  10/01/24               MOBILITY       Patient will complete bed mobility with SBA and min cues.    (Not Progressing)       Start:  09/17/24    Expected End:  10/01/24            Pt. Will demo short household distance functional mobility with SBA using LRAD.   (Not Progressing)       Start:  09/17/24    Expected End:  10/01/24               TRANSFERS       Pt. Will complete stand pivot transfer with SBA assist with min cues and using LRAD.   (Not Progressing)       Start:  09/17/24    Expected End:  10/01/24                     09/30/24 at 10:15 AM   Araceli Krause, OT   [phone]

## 2024-09-30 NOTE — ASSESSMENT & PLAN NOTE
Senait Narvaez is a 53 year-old female with a PMHx of Hgb SC disease (previously on exchange transfusions q4-6 weeks, last transfusion 3/1/24), hx of SVC syndrome, recurrent DVT/PE (on lifelong Coumadin), pHTN (6/2023), cauda equina with saddle numbness, hx SVT, fibromyalgia, Raynaud's disease, CKD II (baseline SCr~<2) and CAN who presented to [facility] ED for diffuse pain and lethargy, then transferred to  MICU for hemodynamic instability. Patient was subsequently intubated due to worsening lethargic and lack of respiratory compensation from significant metabolic acidosis with concern for ACS. Vancomycin and Zosyn started for empiric coverage, patient also started on pressors for BP support. Patient was transfused 2 units pRBCs and then underwent RBCEx on 9/11. Cardiology consulted for c/f NSTEMI vs demand ischemia (ST depressions and elevated troponin), rec treat as ACS. Course further c/b severe CHARLES with peak sCr of 5.4, and acute liver injury with severely elevated liver enzymes (ALT 4119, AST >10k). Liver US only remarkable for fatty infiltration, viral hepatitis panel negative. CHARLES and liver injury improved with supportive management. Pruritic rash noted 9/14, Derm consulted and took multiple biopsies 9/15. Rash then began to worsen to involve the groin, lateral thigh, and scalp with numerous tense bullae. Discontinued heparin given concerns for potential HIT, started bivalirudin. Patient was extubated on 9/16/2024. Started stress dose steroids given continued pressor requirements, pressors Dc'd 9/17. Patient transferred to [facility] 9/17, PT/OT rec SNF. Coumadin bridge was started 9/18. Given persistent rash with unremarkable labs, derm re-biopsied on 9/18, findings ultimately felt to be most c/w fixed drug eruption. Dermatology contacted for bx results (9/20) rec triamcinolone cream BID and interdry cloths. Leukocytosis uptrending 9/21, repeat infectious workup negative (CXR, Abd XR, blood cultures, MRSA negative 9/21).  UA with leuks, urine culture pending (9/21). On 9/25, per nursing and patient, rash seems to be worsening. Derm re-engaged recommended to apply vasoline to all eroded/denuded areas and continue triamcinolone cream to both the black plaques as well as the red areas across the trunk and thighs. 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 Wound care following. For warfarin dosing, per pharmacy, if INR between 2-3 can restart home warfarin 5mg daily, if INR < 2 restart bival. INR 3.4 on 9/25, warfarin stopped, repeat INR 2.8 9/26 and warfarin restarted. On 9/26, increased PO oxycodone and discontinued IV dilaudid in anticipation for discharge. Overnight on 9/29, brain attack called out of concern for change in mental status, no focal deficits noted, BAT cancelled and narcan administered x3 with improvement of mental status. As of 9/30, pt still only A&Ox1 and only using one worded answers to questions. Opioids still held (9/30); continue to monitor improvements in pt mental and renal function. INR 4.0 (9/30), Coumadin held for 9/30, repeat coag pending for 10/1. DC pending improved pain and improvement of skin.    #Waxing and waning AMS  - felt to be due to opioids i/s/o worsening renal function and c/b respiratory acidosis  - all opioids on hold (9/28- )  - keep supplemental O2 on board, but titrate to maintain SPO2 of 90-92% at most  - on CPAP at home, enforce nightly use to extent possible; may need BiPAP if unresolved    # CHARLES on CKD II, improving  - Baseline ~ SCr <2, peak 5.4 this admission. 9/21 sCr 1.58 --> 1.13 (9/25)   - likely pre-renal vs ATN given persistant hypotension over admission, on CKD II, now polyuric phase  - FeNA 0.4%  - Severe hyperkalemia with previous peaked T waves on EKG, resolved  - Nephrology following  - s/p multiple K cocktails in MICU  - encourage increased PO intake     #Purpuric rash,  suspected fixed drug eruption  #Concern for possible heparin-induced thrombocytopenia (HIT) -resolved  - Purple, reticular pattern of plaques noted around the bilateral breasts, scalp and groin,  with tense bullae. Non-erythematous, no purulence  - Some initial concern for possible heparin-induced thrombocytopenia with concurrent skin findings. Discontinued heparin 9/16 and transitioned to bivalirudin  - 4 T score 0, PF4 w/ reflex BINA negative. Hematology ultimately consulted, felt there was low concern for HIT and signed off  - Differential diagnosis includes purpura secondary to infection versus coagulopathy versus vasculopathy versus small and/or medium vessel vasculitis versus calciphylaxis vs drug rash  - Dermatology consulted, work-up to date:  - Low Protein C activity  - ANCA, PR3 and MPO negative  - ISABELLE neg  - cryoglobulin labs- not enough specimen to analyze  - S/p skin punch biopsies x 2 9/15 and one on 9/18, showed SUPERFICIAL EPIDERMAL DEGENERATION WITH ERYTHROCYTE EXTRAVASATION WITH NEUTROPHILS. These findings could be seen secondary to an older trauma, or resolving erythema multiforme, a fixed drug eruption, Edgar-Christopher syndrome, or toxic epidermal necrolysis. Favor Multifocal Fixed Drug Eruption    - Dermatology contacted (9/20) about results, recommended adding vancomycin to allergy list and starting triamcinolone  - Continue triamcinolone 0.1% cream BID (9/20-current) per derm recs  - Plan for left breat suture removal around 9/25-9/27 (per derm, sutures from flank were dissolvable)  - On 9/25, concern from pt and nursing that rash is not improving. Under breasts and groin still causing pain. Bilateral upper thighs and forehead rash improving   - Wound care re-consulted 9/25   - Re-engaged dermatology on 9/25 given worsening rash; recommended to apply vasoline to all eroded/denuded areas and continue triamcinolone cream to both the black plaques as well as the red areas across the trunk and  thighs. Per dermatology, erythema is mild and not palpable, it is most likely part of the drug eruption the patient has previously been known to have     #Leukocytosis , improving  - Likely I/s/o rash vs reactive vs infectious process  - leukocytosis noted on admission to MICU (WBC 24.8) --> 12.7 (9/25) --> 9.8   - leukocytosis improved throughout hospital stay however started uptrending 9/20; now improving on 9/25   - Blood cultures 9/10, 9/12, 9/15 all NGTD  - Strep and Legionella Ag, MRSA nares negative  - CXR (9/21) largely unremarkable, again re-demonstrated perihilar prominence seen on prior XR  - Abdominal xray (9/21) unremarkable  - Repeat blood cultures x 2 (9/21) NGTD  - UA (9/21) with 500 LE, urine cx pending  - UA (9/30) +bacteria, +leuks, urine culture pending (9/29)    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  # ACS  - Previously managed through routine RBC exchanges q6 weeks, most recent RBCEx 3/1/24, per patient pausing on transfusions for time being  - OARRS reviewed, no aberrant behavior noted  - LDH>12,000 (now downtrending), haptoglobin <30, fibrinogen 318, retic % 5.2 on presentation, bili 10.7  - leukocytosis 24.8 on admission to MICU  - CXR (9/10) persistent atelectasis/scarring in the left lower lung  - CT CAP (9/11) Diffuse mosaic attenuation of the lung parenchyma, which can be seen in the setting of small airway disease, pulmonary edema or acute infection  - intubated upon arrival to MICU, now s/p extubation 9/16  - started on vanc and zosyn (finished both courses prior to floor transfer)  - Procal elevated 7.09 (9/11)  - Blood cultures 9/10, 9/12, 9/15 all NGTD  - Strep and Legionella Ag, MRSA nares negative  - trop peak 1431, cards rec treat as ACS (see below)  - S/p 2u pRBCs 9/10 on arrival to MICU  - S/p exchange transfusion 9/11 AM for ACS  - Care plan from 12/6/23- For mild to moderate pain: Dilaudid 0.5mg IVP q3h as needed, for moderate to severe pain Dilaudid 1- 1.5mg q3h PRN  - On arrival to  RNF, started on 0.6mg dilaudid IVP q3h PRN severe pain   - s/p 0.6mg dilaudid IVP q4h PRN breakthrough and 15mg PO oxycodone Q4 hrs severe pain (9/25)  - Per discussion with Dr. Whaley (9/25), okay to increase PO oxycodone to 15-20mg Q4hr to optimize pain for discharge to SNF.   - On 9/26, discontinued IV dilaudid and increased to 20mg PO oxycodone q4hrs for severe pain   - Bowel regimen for opioid induced constipation, zofran for opioid induced nausea, and benadrly for opioid induced pruritus  - Opioids on hold as of 9/28  - c/w home Duloxetine 40mg daily, PO Zofran PRN, Folic Acid 1mg daily, and MVI daily     # ST depression with tropinemia, suspected 2/2 demand ischemia iso ACS  # Hx SVT  - Elevated troponins on admission, peak 1431 and now downtrending   - EKG with ST depressions, likely Type II MI due to vaso-occlusive crisis and anemia  - TTE 9/11: EF 60-65%, RV enlarged and reduced in function which appears stable when compared to last TTE (1/2024)  - Troponin leak is likely due to cardiac demand vs supply mismatch in the setting of worsening anemia and vaso-occlusive crisis  - Cardiology consulted, now signed off with indication to treat troponemia as ACS  - Lasix held in MICU 2/2 hypotension and CHARLES, can resume as able   - Follow-up with Cardiology outpt, FUV 2/13/25     # Acute liver injury, improving  # Direct hyperbilirubinuria, improving  - Suspected 2/2 hemodynamic instability versus sickle cell crisis  - On presentation to  MICU ALT 2611, AST 4727, T bili 10.2, INR 3.0; continues to improve  - CT with hepatic venous congestion, patient lacks gallbladder  - Liver US with Doppler: Diffuse fatty infiltration, unremarkable doppler interrogation of the liver  - EBV IgG positive, IgM negative  - CMV IgG positive, PCR negative  - Acute hepatitis panel unremarkable  - Hepatology initially following, now signed off  - Will continue to monitor, trend daily CMP     # pHTN   - Initial c/f CTEPH as imaging  findings with dilated PA, filling defects, and mosaicism  - VQ scan (6/13/23) with non anatomical basal defects and RUL defect due to scar--> not favoring CTEPH per Dr. Yi and Dr. 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 On lifelong anticoagulation, DOAC discouraged due to high risk of clotting   - home Coumadin 5mg daily initially HELD on admission to MICU 2/2 unstable course  - restarted coumadin 9/18 PM, will plan to bridge with bival, Ok'd by heme   - 9/20 INR 3.8, bival and home warfarin held. Pharmacy rec repeat INR 4 hours after bival was held. If the INR < 2, we can restart the bival, but if the INR 2-3, we can continue with warfarin monotherapy   - 9/20 repeat INR 3.7, pharmacy rec continuing to hold both medications  - 9/21 INR 3.2, per pharmacy - get repeat INR in AM 9/22, if INR between 2-3 can restart home warfarin 5mg daily  -INR 2.6 (9/24) after restarting Warfarin 5mg, will maintain INR goal of 2-3  - INR 3.4 (9/25). Held warfarin on 9/25. Repeat INR 9/26. Plan to restart if <3   - INR 2.8 (9/26), restarted warfarin, recheck INR 9/27   - INR 4.0 (9/30), held warfarin, repeat INR 10/1 ordered, plan to restart if <3  - Follows with Coumadin clinic outpatient  - Plan to consult vascular medicine if INR is not therapeutic on home regimen      # CAN  - cont home CPAP   - cont home Albuterol PRN     # H/O Cauda  Equine syndrome  - Follows Dr. Delacruz as outpatient  - no current issues      # DISPO  - Full code- confirmed on admission  - NOK: Tati Salgado (mother) (#[phone])  - PT/OT rec SNF, plan for rehab at [facility]  - FUV PCP 10/1, Anim 10/7, Cardiology 2/13

## 2024-10-01 ENCOUNTER — APPOINTMENT (OUTPATIENT)
Dept: PRIMARY CARE | Facility: CLINIC | Age: 54
End: 2024-10-01
Payer: COMMERCIAL

## 2024-10-01 LAB
ALBUMIN SERPL BCP-MCNC: 2.7 G/DL (ref 3.4–5)
ALP SERPL-CCNC: 153 U/L (ref 33–110)
ALT SERPL W P-5'-P-CCNC: 28 U/L (ref 7–45)
ANION GAP SERPL CALC-SCNC: 17 MMOL/L (ref 10–20)
APTT PPP: 58 SECONDS (ref 27–38)
AST SERPL W P-5'-P-CCNC: 42 U/L (ref 9–39)
BACTERIA UR CULT: ABNORMAL
BASOPHILS # BLD AUTO: 0.04 X10*3/UL (ref 0–0.1)
BASOPHILS NFR BLD AUTO: 0.4 %
BILIRUB SERPL-MCNC: 2.3 MG/DL (ref 0–1.2)
BUN SERPL-MCNC: 26 MG/DL (ref 6–23)
CALCIUM SERPL-MCNC: 9 MG/DL (ref 8.6–10.6)
CHLORIDE SERPL-SCNC: 106 MMOL/L (ref 98–107)
CO2 SERPL-SCNC: 27 MMOL/L (ref 21–32)
CREAT SERPL-MCNC: 1.72 MG/DL (ref 0.5–1.05)
D DIMER PPP FEU-MCNC: 904 NG/ML FEU
EGFRCR SERPLBLD CKD-EPI 2021: 35 ML/MIN/1.73M*2
EOSINOPHIL # BLD AUTO: 0.08 X10*3/UL (ref 0–0.7)
EOSINOPHIL NFR BLD AUTO: 0.8 %
ERYTHROCYTE [DISTWIDTH] IN BLOOD BY AUTOMATED COUNT: 21 % (ref 11.5–14.5)
FIBRINOGEN PPP-MCNC: 331 MG/DL (ref 200–400)
GLUCOSE BLD MANUAL STRIP-MCNC: 52 MG/DL (ref 74–99)
GLUCOSE BLD MANUAL STRIP-MCNC: 78 MG/DL (ref 74–99)
GLUCOSE SERPL-MCNC: 53 MG/DL (ref 74–99)
HCT VFR BLD AUTO: 26.5 % (ref 36–46)
HGB BLD-MCNC: 8.5 G/DL (ref 12–16)
HGB RETIC QN: 31 PG (ref 28–38)
IMM GRANULOCYTES # BLD AUTO: 0.08 X10*3/UL (ref 0–0.7)
IMM GRANULOCYTES NFR BLD AUTO: 0.8 % (ref 0–0.9)
IMMATURE RETIC FRACTION: 12.8 %
INR PPP: 6.8 (ref 0.9–1.1)
LDH SERPL L TO P-CCNC: 325 U/L (ref 84–246)
LYMPHOCYTES # BLD AUTO: 1.31 X10*3/UL (ref 1.2–4.8)
LYMPHOCYTES NFR BLD AUTO: 13.5 %
MCH RBC QN AUTO: 29.8 PG (ref 26–34)
MCHC RBC AUTO-ENTMCNC: 32.1 G/DL (ref 32–36)
MCV RBC AUTO: 93 FL (ref 80–100)
MONOCYTES # BLD AUTO: 1.54 X10*3/UL (ref 0.1–1)
MONOCYTES NFR BLD AUTO: 15.9 %
NEUTROPHILS # BLD AUTO: 6.66 X10*3/UL (ref 1.2–7.7)
NEUTROPHILS NFR BLD AUTO: 68.6 %
NRBC BLD-RTO: 16.9 /100 WBCS (ref 0–0)
PAPPENHEIMER BOD BLD QL SMEAR: PRESENT
PLATELET # BLD AUTO: 269 X10*3/UL (ref 150–450)
POLYCHROMASIA BLD QL SMEAR: NORMAL
POTASSIUM SERPL-SCNC: 5.1 MMOL/L (ref 3.5–5.3)
PROT SERPL-MCNC: 6.5 G/DL (ref 6.4–8.2)
PROTHROMBIN TIME: 78.3 SECONDS (ref 9.8–12.8)
RBC # BLD AUTO: 2.85 X10*6/UL (ref 4–5.2)
RBC MORPH BLD: NORMAL
RETICS #: 0.41 X10*6/UL (ref 0.02–0.08)
RETICS/RBC NFR AUTO: 14.4 % (ref 0.5–2)
SODIUM SERPL-SCNC: 145 MMOL/L (ref 136–145)
TARGETS BLD QL SMEAR: NORMAL
WBC # BLD AUTO: 9.7 X10*3/UL (ref 4.4–11.3)

## 2024-10-01 PROCEDURE — 2500000001 HC RX 250 WO HCPCS SELF ADMINISTERED DRUGS (ALT 637 FOR MEDICARE OP)

## 2024-10-01 PROCEDURE — 36415 COLL VENOUS BLD VENIPUNCTURE: CPT

## 2024-10-01 PROCEDURE — 82947 ASSAY GLUCOSE BLOOD QUANT: CPT

## 2024-10-01 PROCEDURE — 83615 LACTATE (LD) (LDH) ENZYME: CPT

## 2024-10-01 PROCEDURE — 99232 SBSQ HOSP IP/OBS MODERATE 35: CPT

## 2024-10-01 PROCEDURE — 85379 FIBRIN DEGRADATION QUANT: CPT

## 2024-10-01 PROCEDURE — 80053 COMPREHEN METABOLIC PANEL: CPT

## 2024-10-01 PROCEDURE — 2500000005 HC RX 250 GENERAL PHARMACY W/O HCPCS: Performed by: NURSE PRACTITIONER

## 2024-10-01 PROCEDURE — 85384 FIBRINOGEN ACTIVITY: CPT

## 2024-10-01 PROCEDURE — 85610 PROTHROMBIN TIME: CPT

## 2024-10-01 PROCEDURE — 85025 COMPLETE CBC W/AUTO DIFF WBC: CPT | Performed by: NURSE PRACTITIONER

## 2024-10-01 PROCEDURE — 1170000001 HC PRIVATE ONCOLOGY ROOM DAILY

## 2024-10-01 PROCEDURE — 94660 CPAP INITIATION&MGMT: CPT

## 2024-10-01 PROCEDURE — 2500000004 HC RX 250 GENERAL PHARMACY W/ HCPCS (ALT 636 FOR OP/ED)

## 2024-10-01 PROCEDURE — 99233 SBSQ HOSP IP/OBS HIGH 50: CPT

## 2024-10-01 PROCEDURE — 85045 AUTOMATED RETICULOCYTE COUNT: CPT

## 2024-10-01 ASSESSMENT — COGNITIVE AND FUNCTIONAL STATUS - GENERAL
CLIMB 3 TO 5 STEPS WITH RAILING: TOTAL
WALKING IN HOSPITAL ROOM: A LOT
CLIMB 3 TO 5 STEPS WITH RAILING: TOTAL
STANDING UP FROM CHAIR USING ARMS: A LOT
TOILETING: A LOT
STANDING UP FROM CHAIR USING ARMS: A LOT
MOBILITY SCORE: 11
TURNING FROM BACK TO SIDE WHILE IN FLAT BAD: A LOT
EATING MEALS: A LOT
PERSONAL GROOMING: A LOT
HELP NEEDED FOR BATHING: A LOT
MOVING TO AND FROM BED TO CHAIR: A LOT
TURNING FROM BACK TO SIDE WHILE IN FLAT BAD: A LOT
DAILY ACTIVITIY SCORE: 12
DRESSING REGULAR UPPER BODY CLOTHING: A LOT
PERSONAL GROOMING: A LOT
TOILETING: A LOT
EATING MEALS: A LOT
MOVING FROM LYING ON BACK TO SITTING ON SIDE OF FLAT BED WITH BEDRAILS: A LOT
MOBILITY SCORE: 11
HELP NEEDED FOR BATHING: A LOT
MOVING FROM LYING ON BACK TO SITTING ON SIDE OF FLAT BED WITH BEDRAILS: A LOT
DRESSING REGULAR UPPER BODY CLOTHING: A LOT
MOVING TO AND FROM BED TO CHAIR: A LOT
DRESSING REGULAR LOWER BODY CLOTHING: A LOT
WALKING IN HOSPITAL ROOM: A LOT
DAILY ACTIVITIY SCORE: 12
DRESSING REGULAR LOWER BODY CLOTHING: A LOT

## 2024-10-01 ASSESSMENT — PAIN SCALES - GENERAL
PAINLEVEL_OUTOF10: 0 - NO PAIN
PAINLEVEL_OUTOF10: 0 - NO PAIN

## 2024-10-01 ASSESSMENT — PAIN - FUNCTIONAL ASSESSMENT: PAIN_FUNCTIONAL_ASSESSMENT: 0-10

## 2024-10-01 NOTE — PROGRESS NOTES
Physical Therapy                 Therapy Communication Note    Patient Name: Senait Narvaez  MRN: [id number]  Department: [facility]  Room: [address] [place] [address]  Today's Date: 10/1/2024     Discipline: Physical Therapy    Missed Visit Reason: Missed Visit Reason: Patient placed on medical hold    Missed Time: Attempt    Comment: Will hold PT today due to pt's decreased status of INR 6.8 and glucose 53.

## 2024-10-01 NOTE — PROGRESS NOTES
"Nutrition Follow Up Assessment:   Nutrition Assessment    The patient is a 54 y.o. female who is hospital day #21.  Since last RD visit:  - derm re-engaged for worsening rash  - warfarin dosage being adjusted based on INR   - since 09/29 pt w/ worsening mental status. Believed to be r/t decreased clearance of meds 2/2 CHARLES. Pt continues to be A&Ox1.     PMHx: Hgb SC disease,  CKD II (baseline SCr~<2)    Nutrition History:  Food and Nutrient History: Last record of PO intake is on 09/24 - 50% of pizza. At time of visit pt unable to provide information. Breakfast tray consisting of oatmeal, potatoes, fruit, and Ensure Plus untouched. Noted about 5 unopened Ensure Plus bottles at bedside. Suspect pt's PO intake further decreased from last visit d/t AMS.      Anthropometrics:  Start of admission anthropometrics:  Height: 165.1 cm (5' 5\")  Weight: 110 kg (242 lb 8.1 oz)  BMI (Calculated): 40.36    IBW/kg (Dietitian Calculated): 56.8 kg  Percent of IBW: 194 %       Weight         9/12/2024  1129 9/13/2024  0600 9/14/2024  0835 9/16/2024  0600 9/17/2024  0600    Weight: 123 kg (270 lb 8.1 oz) 123 kg (271 lb 9.7 oz) 122 kg (268 lb 15.4 oz) 123 kg (270 lb 1 oz) 120 kg (264 lb 12.4 oz)            Weight Change %:  Weight History / % Weight Change: No new wt to assess. Possible pt w/ some wt loss given inadequate PO intake for at least 2 weeks.    Nutrition Focused Physical Exam Findings:  defer: refer to 09/11    Edema:  Edema Location: non pitting generalized / +1 BLE  Physical Findings:  Skin: Negative (rash)    Objective Data:    Last BM Date: 09/29/24    Nutrition Significant Labs:    Results from last 7 days   Lab Units 10/01/24  0846 10/01/24  0844 09/30/24  0705 09/29/24  0848 09/29/24  0246 09/28/24  0819 09/28/24  0818 09/27/24  0809   HEMOGLOBIN g/dL 8.5*  --  8.0* 8.1* 7.7*   < >  --  8.5*   MCV fL 93  --  94 93 99   < >  --  95   GLUCOSE mg/dL  --  53* 62* 73* 64*  --  78 76   POTASSIUM mmol/L  --  5.1 5.1 5.5* " 5.9*  --  4.9 4.3   SODIUM mmol/L  --  145 139 135* 134*  --  135* 139   PHOSPHORUS mg/dL  --   --   --  5.6*  --   --   --   --    CREATININE mg/dL  --  1.72* 2.37* 2.88* 2.93*  --  2.65* 1.64*   BUN mg/dL  --  26* 29* 29* 28*  --  24* 17   ALT U/L  --  28 34  --  44  --  47* 52*   AST U/L  --  42* 46*  --  52*  --  46* 53*   ALK PHOS U/L  --  153* 154*  --  171*  --  176* 173*    < > = values in this interval not displayed.       Nutrition Specific Medications:  folic acid, 1 mg, oral, Daily  pantoprazole, 40 mg, intravenous, Daily  polyethylene glycol, 17 g, oral, BID  sennosides, 2 tablet, oral, BID  thiamine, 100 mg, intravenous, Daily  [Held by provider] warfarin, 5 mg, oral, Daily      I/O:   No intake/output data recorded.    Dietary Orders (From admission, onward)       Start     Ordered    09/23/24 1449  Oral nutritional supplements  Until discontinued        Question Answer Comment   Deliver with Breakfast chocolate - allow more if requested   Deliver with Lunch    Select supplement: Ensure Plus        09/23/24 1449    09/17/24 1014  Adult diet Regular  Diet effective now        Question:  Diet type  Answer:  Regular    09/17/24 1013                     Estimated Needs:   Total Energy Estimated Needs (kCal): 1704 kCal  Method for Estimating Needs: 30 kcal/kg IBW  MSJ = 1699    Total Protein Estimated Needs (g): 85 g  Method for Estimating Needs: 56.8kg x 1.5 g/kg    Method for Estimating Needs: 1 ml/kcal or per team          Nutrition Diagnosis        Nutrition Diagnosis  Patient has Nutrition Diagnosis: Yes  Diagnosis Status (1): Resolved  Nutrition Diagnosis 1: Increased nutrient needs  Related to (1): need for intubation; obesity  As Evidenced by (1): altered metabolism in the setting of critical illness with h/o obesity (BMI= 40.4)  Additional Nutrition Diagnosis: Diagnosis 2  Diagnosis Status (2): Ongoing  Nutrition Diagnosis 2: Predicted inadequate nutrient intake  Related to (2): hospital  course  As Evidenced by (2): prolonged intubation causing pain w/ swallowing and pt reporting only eating soft foods and liquids; now pt with AMS decreasing PO intake       Nutrition Interventions/Recommendations   Individualized Nutrition Prescription Provided for : 1700 kcal and 85g protein via oral diet    Obtain repeat wt.   Pt's PO intake meeting <75% of nutrient needs. Likely related to AMS. If mentation does not improve in the next couple of days should consider initiation of nutrition support.              Nutrition Monitoring and Evaluation   Monitoring and Evaluation Plan: Energy intake  Energy Intake: Estimated energy intake  Criteria: >50% of nutr needs      Monitoring and Evaluation Plan: Weight  Weight: Weight change  Criteria: no wt change                   Time Spent (min): 30 minutes

## 2024-10-01 NOTE — CARE PLAN
The clinical goals for the shift include pt will remain safe and free from injury throughout shift 10/1/2024 1900        Problem: Pain - Adult  Goal: Verbalizes/displays adequate comfort level or baseline comfort level  Outcome: Progressing     Problem: Safety - Adult  Goal: Free from fall injury  Outcome: Progressing     Problem: Skin  Goal: Decreased wound size/increased tissue granulation at next dressing change  Outcome: Progressing  Goal: Participates in plan/prevention/treatment measures  Outcome: Progressing  Goal: Prevent/manage excess moisture  Outcome: Progressing  Goal: Prevent/minimize sheer/friction injuries  Outcome: Progressing  Goal: Promote/optimize nutrition  Outcome: Progressing  Goal: Promote skin healing  Outcome: Progressing  Flowsheets (Taken 10/1/2024 8227)  Promote skin healing:   Assess skin/pad under line(s)/device(s)   Rotate device position/do not position patient on device     Problem: Knowledge Deficit  Goal: Patient/family/caregiver demonstrates understanding of disease process, treatment plan, medications, and discharge instructions  Outcome: Progressing     Problem: Mechanical Ventilation  Goal: Patient Will Maintain Patent Airway  Outcome: Progressing  Goal: Oral health is maintained or improved  Outcome: Progressing  Goal: Tracheostomy will be managed safely  Outcome: Progressing  Goal: ET tube will be managed safely  Outcome: Progressing  Goal: Ability to express needs and understand communication  Outcome: Progressing  Goal: Mobility/activity is maintained at optimum level for patient  Outcome: Progressing     Problem: Pain  Goal: Takes deep breaths with improved pain control throughout the shift  Outcome: Progressing  Goal: Turns in bed with improved pain control throughout the shift  Outcome: Progressing  Goal: Walks with improved pain control throughout the shift  Outcome: Progressing  Goal: Performs ADL's with improved pain control throughout shift  Outcome:  Progressing  Goal: Participates in PT with improved pain control throughout the shift  Outcome: Progressing  Goal: Free from opioid side effects throughout the shift  Outcome: Progressing  Goal: Free from acute confusion related to pain meds throughout the shift  Outcome: Progressing     Problem: Nutrition  Goal: Less than 5 days NPO/clear liquids  Outcome: Progressing  Goal: Oral intake greater than 50%  Outcome: Progressing  Goal: Oral intake greater 75%  Outcome: Progressing  Goal: Consume prescribed supplement  Outcome: Progressing  Goal: Adequate PO fluid intake  Outcome: Progressing  Goal: Nutrition support goals are met within 48 hrs  Outcome: Progressing  Goal: Nutrition support is meeting 75% of nutrient needs  Outcome: Progressing  Goal: Tube feed tolerance  Outcome: Progressing  Goal: BG  mg/dL  Outcome: Progressing  Goal: Lab values WNL  Outcome: Progressing  Goal: Electrolytes WNL  Outcome: Progressing  Goal: Promote healing  Outcome: Progressing  Goal: Maintain stable weight  Outcome: Progressing  Goal: Reduce weight from edema/fluid  Outcome: Progressing  Goal: Gradual weight gain  Outcome: Progressing  Goal: Improve ostomy output  Outcome: Progressing     Problem: Fall/Injury  Goal: Not fall by end of shift  Outcome: Progressing  Goal: Be free from injury by end of the shift  Outcome: Progressing  Goal: Verbalize understanding of personal risk factors for fall in the hospital  Outcome: Progressing  Goal: Verbalize understanding of risk factor reduction measures to prevent injury from fall in the home  Outcome: Progressing  Goal: Use assistive devices by end of the shift  Outcome: Progressing  Goal: Pace activities to prevent fatigue by end of the shift  Outcome: Progressing

## 2024-10-01 NOTE — PROGRESS NOTES
09/24/24 1300   Discharge Planning   Who is requesting discharge planning? Patient   Home or Post Acute Services Post acute facilities (Rehab/SNF/etc)   Type of Post Acute Facility Services Skilled nursing   Expected Discharge Disposition SNF     Care Transitions Note  09/24/24  Met with Senait and mother Tati ([phone]) at bedside to discuss discharge planning. [facility] is able to accept as long as patient is off IV pain meds. Team aware, plan was to wean off IV dilaudid tomorrow per rounds this morning. Will send updated clinicals from today.    Senait and Tati voiced concerns about Senait's rash. Tati would like the rash to improve before going to SNF. Tati states Dr. Whaley plans to check in with them. This LSW sent message to [facility] outpatient SW to update her. Tati had questions about SNF providing injections or oral pain meds, LSW sent message to [facility] for more info. Mother also adds that Senait needs to be admitted for exchanges every 3-4 weeks, wonders how this would be handled if still at SNF. LSW will update team and follow for needs that arise.   YULI Zamorano     UPDATE 09/27/24  Per medical team, Senait's rash is stable and improving, although not totally better. They discussed with Senait and Tati that it will take time to heal. She has weaned off IV pain meds, now on oxy. Plan to go to SNF on Monday. LSW to start pre-cert for [facility].     UPDATE 09/30/24  Per team, patient is not medically ready. Pre-cert was not started for [facility], updates sent to facility. Following for discharge needs as patient improves.  YULI Zamorano     UPDATE 10/01/24  Patient had AMS, CHARLES, over the last few days which are beginning to improve per rounds this morning. Pain meds currently on hold. Updates sent to [facility]. ADOD may be later in the week pending further improvement. Will follow.   YULI Zamorano

## 2024-10-01 NOTE — PROGRESS NOTES
Senait Narvaez   54 y.o.      MRN/Room: [id number]    Subjective: Patient with improvement in mentation today. On arrival to patient room she was talking to a family member on the phone while eating breakfast.     Objective:     Meds:   folic acid, 1 mg, Daily  [Held by provider] metoprolol tartrate, 12.5 mg, BID  nystatin, 1 Application, BID  [Held by provider] oxyCODONE ER, 10 mg, q12h BEATA  oxygen, , Continuous - Inhalation  pantoprazole, 40 mg, Daily  polyethylene glycol, 17 g, BID  sennosides, 2 tablet, BID  thiamine, 100 mg, Daily  triamcinolone, , BID  [Held by provider] warfarin, 5 mg, Daily  white petrolatum, , BID         acetaminophen, 975 mg, q12h PRN  albuterol, 2.5 mg, q6h PRN  alteplase, 2 mg, PRN  bisacodyl, 10 mg, Daily PRN  dextrose, 12.5 g, q15 min PRN  dextrose, 25 g, q15 min PRN  glucagon, 1 mg, q15 min PRN  glucagon, 1 mg, q15 min PRN  naloxone, 0.4 mg, q5 min PRN  ondansetron, 4 mg, q8h PRN  [Held by provider] oxyCODONE, 20 mg, q4h PRN  oxygen, , Continuous PRN - O2/gases  sodium chloride, 1 spray, 4x daily PRN        Vitals:    10/01/24 1541   BP: 108/68   Pulse: 108   Resp: 18   Temp: 37 °C (98.6 °F)   SpO2: 95%          Intake/Output Summary (Last 24 hours) at 10/1/2024 1709  Last data filed at 10/1/2024 0945  Gross per 24 hour   Intake 150 ml   Output 0 ml   Net 150 ml       General appearance: no distress  Eyes: non-icteric  Heart: Regular, Rate and rhythm   Lungs: CTA bilat No wheezing/crackles  Abdomen: soft, nt/nd  Extremities: Trace edema bilateral lower extremities, SCD's in place   No Slater  Neuro: Responding to questions in short phrases. AxOx2       Blood Labs:  Results for orders placed or performed during the hospital encounter of 09/10/24 (from the past 24 hour(s))   Lactate dehydrogenase   Result Value Ref Range     (H) 84 - 246 U/L   Comprehensive metabolic panel   Result Value Ref Range    Glucose 53 (LL) 74 - 99 mg/dL    Sodium 145 136 - 145 mmol/L    Potassium  5.1 3.5 - 5.3 mmol/L    Chloride 106 98 - 107 mmol/L    Bicarbonate 27 21 - 32 mmol/L    Anion Gap 17 10 - 20 mmol/L    Urea Nitrogen 26 (H) 6 - 23 mg/dL    Creatinine 1.72 (H) 0.50 - 1.05 mg/dL    eGFR 35 (L) >60 mL/min/1.73m*2    Calcium 9.0 8.6 - 10.6 mg/dL    Albumin 2.7 (L) 3.4 - 5.0 g/dL    Alkaline Phosphatase 153 (H) 33 - 110 U/L    Total Protein 6.5 6.4 - 8.2 g/dL    AST 42 (H) 9 - 39 U/L    Bilirubin, Total 2.3 (H) 0.0 - 1.2 mg/dL    ALT 28 7 - 45 U/L   Coagulation Screen   Result Value Ref Range    Protime 78.3 (HH) 9.8 - 12.8 seconds    INR 6.8 (HH) 0.9 - 1.1    aPTT 58 (H) 27 - 38 seconds   Reticulocytes   Result Value Ref Range    Retic % 14.4 (H) 0.5 - 2.0 %    Retic Absolute 0.409 (H) 0.018 - 0.083 x10*6/uL    Reticulocyte Hemoglobin 31 28 - 38 pg    Immature Retic fraction 12.8 <=16.0 %   CBC and Auto Differential   Result Value Ref Range    WBC 9.7 4.4 - 11.3 x10*3/uL    nRBC 16.9 (H) 0.0 - 0.0 /100 WBCs    RBC 2.85 (L) 4.00 - 5.20 x10*6/uL    Hemoglobin 8.5 (L) 12.0 - 16.0 g/dL    Hematocrit 26.5 (L) 36.0 - 46.0 %    MCV 93 80 - 100 fL    MCH 29.8 26.0 - 34.0 pg    MCHC 32.1 32.0 - 36.0 g/dL    RDW 21.0 (H) 11.5 - 14.5 %    Platelets 269 150 - 450 x10*3/uL    Neutrophils % 68.6 40.0 - 80.0 %    Immature Granulocytes %, Automated 0.8 0.0 - 0.9 %    Lymphocytes % 13.5 13.0 - 44.0 %    Monocytes % 15.9 2.0 - 10.0 %    Eosinophils % 0.8 0.0 - 6.0 %    Basophils % 0.4 0.0 - 2.0 %    Neutrophils Absolute 6.66 1.20 - 7.70 x10*3/uL    Immature Granulocytes Absolute, Automated 0.08 0.00 - 0.70 x10*3/uL    Lymphocytes Absolute 1.31 1.20 - 4.80 x10*3/uL    Monocytes Absolute 1.54 (H) 0.10 - 1.00 x10*3/uL    Eosinophils Absolute 0.08 0.00 - 0.70 x10*3/uL    Basophils Absolute 0.04 0.00 - 0.10 x10*3/uL   Morphology   Result Value Ref Range    RBC Morphology See Below     Polychromasia Mild     Target Cells Many     Pappenheimer Bodies Present    POCT GLUCOSE   Result Value Ref Range    POCT Glucose 52 (L) 74 -  99 mg/dL   POCT GLUCOSE   Result Value Ref Range    POCT Glucose 78 74 - 99 mg/dL     *Note: Due to a large number of results and/or encounters for the requested time period, some results have not been displayed. A complete set of results can be found in Results Review.        ASSESSMENT:  Senait Narvaez is a  54 y.o. 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 Nephrology was previously following for ATN, due to shock requiring pressor support and exchange transfusion. Patient's creatinine peaked at 5.4 but continued to downtrend so Nephrology signed off 9/17. We are now reengaged for new CHARLES improving after fluid bolus.       #CHARLES stage 3 oliguric on CKD II (baseline eGFR 72 - Baseline creatinine 0.8-1)  :: FeNA 0.4%, Urine Na 22, K 33, Cl 18, Cr 127.8   :: s/p IV contrast on 9/10 with CT scan, no recent contrast   :: Creatinine during admission: 1.13, (9/26) 1.10 (9/27), 1.64 (9/28),  2.65 (9/29) 2.93   Repeat 2.88 (9/29)   :: Creatinine 1.72  today down from 2.37   :: S/p 500 ml NS bolus     Etiology: Likely prerenal versus ATN given persistent Hypotension over admission, and recent documentation of poor PO intake.     #UTI   :: Urinalysis Leukocyte esterase positive, 21-50 WBC's, >20 RBC's     #Hyperkalemia resolving   - Hyperkalemia: K: 5.5 (9/29) --> 5.1 after 1 dose of 10g Lokelma     #Respiratory acidosis  - ABG 7.27, pCO2 63, pO2 85  - HCO3 29 on RFP    Due to respiratory acidosis given PCO2 elevation. Not metabolic as Bicarb is not decreased    RECOMMENDATIONS:  - Nephrology will follow peripherally   - Try to ovoid  oxycodone as analgesic as there is some renal excretion. Can try IV dilaudid or fentanyl.   - Monitor RFP   - Strict I/O charting   - Avoid nephrotoxic agents.             Jessenia Diaz MD  PGY-1 Internal Medicine Resident   Daytime / Weekend Renal Pager [phone]  After 7 pm Emergencies [phone] Pager [phone]

## 2024-10-01 NOTE — PROGRESS NOTES
"Senait Narvaez is a 54 y.o. female on day 21 of admission presenting with Sickle cell disease with crisis and other complication.    Subjective   Patient seen sitting upright in bed. She is alert this morning and able to tell me her name this morning and that we are in [place] in the hospital. She states that she has pain but is not able to specify where pain is at. Also adds that she feels improved from prior days. Patient is slow with her responses.        Objective     Physical Exam  Constitutional:       Appearance: She is ill-appearing.   HENT:      Head: Normocephalic.      Mouth/Throat:      Mouth: Mucous membranes are moist.   Eyes:      Pupils: Pupils are equal, round, and reactive to light.   Cardiovascular:      Rate and Rhythm: Normal rate and regular rhythm.      Pulses: Normal pulses.      Heart sounds: Normal heart sounds.   Pulmonary:      Effort: Pulmonary effort is normal.      Breath sounds: Normal breath sounds.   Abdominal:      General: Abdomen is flat. Bowel sounds are normal.      Palpations: Abdomen is soft.   Musculoskeletal:         General: Normal range of motion.      Cervical back: Normal range of motion and neck supple.   Skin:     General: Skin is warm.      Comments: On the bilateral breasts, flanks, umbilical area, there are black plaques with some desquamating scale, areas of erosions on the inferior aspect of the breasts as well as inner thighs ; ill defined erythema on the abdomen, breasts, and thighs     Neurological:      General: No focal deficit present.      Mental Status: She is alert.      Comments: Oriented x2, disoriented to time. Slow to respond but easily redirectable    Psychiatric:         Mood and Affect: Mood normal.         Last Recorded Vitals  Blood pressure 101/63, pulse 83, temperature 36.4 °C (97.5 °F), temperature source Temporal, resp. rate 18, height 1.651 m (5' 5\"), weight 120 kg (264 lb 12.4 oz), SpO2 94%.  Intake/Output last 3 Shifts:  No intake/output " data recorded.    Relevant Results                 Scheduled medications  folic acid, 1 mg, oral, Daily  [Held by provider] metoprolol tartrate, 12.5 mg, oral, BID  nystatin, 1 Application, Topical, BID  [Held by provider] oxyCODONE ER, 10 mg, oral, q12h BEATA  oxygen, , inhalation, Continuous - Inhalation  pantoprazole, 40 mg, intravenous, Daily  polyethylene glycol, 17 g, oral, BID  sennosides, 2 tablet, oral, BID  thiamine, 100 mg, intravenous, Daily  triamcinolone, , Topical, BID  [Held by provider] warfarin, 5 mg, oral, Daily  white petrolatum, , Topical, BID      Continuous medications     PRN medications  PRN medications: acetaminophen, albuterol, alteplase, bisacodyl, dextrose, dextrose, glucagon, glucagon, naloxone, ondansetron, [Held by provider] oxyCODONE, oxygen, sodium chloride               Assessment/Plan   Assessment & Plan  Sickle cell disease with crisis and other complication    Senait Narvaez is a 53 year-old female with a PMHx of Hgb SC disease (previously on exchange transfusions q4-6 weeks, last transfusion 3/1/24), hx of SVC syndrome, 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 Patient was subsequently intubated due to worsening lethargic and lack of respiratory compensation from significant metabolic acidosis with concern for ACS. Vancomycin and Zosyn started for empiric coverage, patient also started on pressors for BP support. Patient was transfused 2 units pRBCs and then underwent RBCEx on 9/11. Cardiology consulted for c/f NSTEMI vs demand ischemia (ST depressions and elevated troponin), rec treat as ACS. Course further c/b severe CHARLES with peak sCr of 5.4, and acute liver injury with severely elevated liver enzymes (ALT 4119, AST >10k). Liver US only remarkable  for fatty infiltration, viral hepatitis panel negative. CHARLES and liver injury improved with supportive management. Pruritic rash noted 9/14, Derm consulted and took multiple biopsies 9/15. Rash then began to worsen to involve the groin, lateral thigh, and scalp with numerous tense bullae. Discontinued heparin given concerns for potential HIT, started bivalirudin. Patient was extubated on 9/16/2024. Started stress dose steroids given continued pressor requirements, pressors Dc'd 9/17. Patient transferred to [facility] 9/17, PT/OT rec SNF. Coumadin bridge was started 9/18. Given persistent rash with unremarkable labs, derm re-biopsied on 9/18, findings ultimately felt to be most c/w fixed drug eruption. Dermatology contacted for bx results (9/20) rec triamcinolone cream BID and interdry cloths. Leukocytosis uptrending 9/21, repeat infectious workup negative (CXR, Abd XR, blood cultures, MRSA negative 9/21). UA with leuks, urine culture negative (9/21). On 9/25, per nursing and patient, rash seems to be worsening. Derm re-engaged recommended to apply vasoline to all eroded/denuded areas and continue triamcinolone cream to both the black plaques as well as the red areas across the trunk and thighs. 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 Wound care following. For warfarin dosing, per pharmacy, if INR between 2-3 can restart home warfarin 5mg daily, if INR < 2 restart bival. INR 3.4 on 9/25, warfarin stopped, repeat INR 2.8 9/26 and warfarin restarted. On 9/26, increased PO oxycodone and discontinued IV dilaudid in anticipation for discharge. Overnight on 9/29, brain attack called out of concern for change in mental status, no focal deficits noted, BAT cancelled and narcan administered x3 with improvement of mental status. As of 10/1, mental status improving, AMS likely due to opioids i/s/o worsening renal function and c/b  respiratory acidosis. Nephrology re-engaged on 9/29, rec that CHARLES likely pre-renal v. ATN given persistent hypotension. As of 10/1, renal function and AMS improving, continue to hold opioids and re-assess VBG lactate on 10/2. INR 6.8 (10/1), no acitve bleeding, per attending and pharmacy, continue to hold coumadin. DC to SNF pending improved mental status, improved PT/PTT/INR, improved renal function.      #Waxing and waning AMS  - felt to be due to opioids i/s/o worsening renal function and c/b respiratory acidosis  - Overnight on 9/29, brain attack called out of concern for change in mental status, no focal deficits noted, BAT cancelled and narcan administered x3 with improvement of mental status  - all opioids on hold (9/28- )  - UA - +leuks, bacteria, 21-50 WBC; urine culture: likely contaminate (pt with purewick) (10/1)  - Mental status improving as of 10/1   - keep supplemental O2 on board, but titrate to maintain SPO2 of 90-92% at most  - on CPAP at home, enforce nightly use to extent possible; may need BiPAP if unresolved  - plan repeat VBG lactate on 10/2     # Prolonged PT/PTT/INR  # DVT/ PE/ SVC Thrombus  - Hx SVC stricture s/p SVC angioplasty  - B/l Upper Extremity and Facial Swelling d/t partial filling defect within the SVC and a thrombus of the SVC complicated by bilateral Mediport catheters. On lifelong anticoagulation, DOAC discouraged due to high risk of clotting   - home Coumadin 5mg daily initially HELD on admission to MICU 2/2 unstable course  - restarted coumadin 9/18 PM, will plan to bridge with bival, Ok'd by heme   - 9/20 INR 3.8, bival and home warfarin held. Pharmacy rec repeat INR 4 hours after bival was held. If the INR < 2, we can restart the bival, but if the INR 2-3, we can continue with warfarin monotherapy   - 9/20 repeat INR 3.7, pharmacy rec continuing to hold both medications  - 9/21 INR 3.2, per pharmacy - get repeat INR in AM 9/22, if INR between 2-3 can restart home warfarin  5mg daily  -INR 2.6 (9/24) after restarting Warfarin 5mg, will maintain INR goal of 2-3  - INR 3.4 (9/25). Held warfarin on 9/25. Repeat INR 9/26. Plan to restart if <3   - INR 2.8 (9/26), restarted warfarin, recheck INR 9/27   - INR 4.0 (9/30), held warfarin, repeat INR 10/1 ordered, plan to restart if <3  - INR 6.8 (10/1), patient without any active bleeding, per attending and pharmacy, hold vitamin K and recheck labs in the AM   - Follows with Coumadin clinic outpatient  - Plan to consult vascular medicine if INR is not therapeutic on home regimen      # CHARLES on CKD II, improving  - Baseline ~ SCr <2, peak 5.4 this admission. 9/21 sCr 1.58 --> 1.13 (9/25) --> 1.72 (10/1)   - likely pre-renal vs ATN given persistant hypotension over admission, on CKD II, now polyuric phase  - FeNA 0.4%  - s/p 500mL bolus 9/29 with improvement of sCr  - UA - +leuks, bacteria, 21-50 WBC; urine culture: likely contaminate (pt with purewick) (10/1)  - continue to hold home oxycodone given decreased renal excretion   - encourage increased PO intake  - Nephrology following and rec ikely pre-renal vs ATN given persistant hypotension over admission, on CKD II, now polyuric phase, continue to hold oxycodone     #Respiratory acidosis  - ABG 7.27, pCO2 63, pO2 85 on 9/29   - HCO3 29 on RFP    - Due to respiratory acidosis given PCO2 elevation  - plan repeat VBG lactate on 10/2      #Purpuric rash, suspected fixed drug eruption  #Concern for possible heparin-induced thrombocytopenia (HIT) -resolved  - Purple, reticular pattern of plaques noted around the bilateral breasts, scalp and groin,  with tense bullae. Non-erythematous, no purulence  - Some initial concern for possible heparin-induced thrombocytopenia with concurrent skin findings. Discontinued heparin 9/16 and transitioned to bivalirudin  - 4 T score 0, PF4 w/ reflex BINA negative. Hematology ultimately consulted, felt there was low concern for HIT and signed off  - Differential  diagnosis includes purpura secondary to infection versus coagulopathy versus vasculopathy versus small and/or medium vessel vasculitis versus calciphylaxis vs drug rash  - Dermatology consulted, work-up to date:  - Low Protein C activity  - ANCA, PR3 and MPO negative  - ISABELLE neg  - cryoglobulin labs- not enough specimen to analyze  - S/p skin punch biopsies x 2 9/15 and one on 9/18, showed SUPERFICIAL EPIDERMAL DEGENERATION WITH ERYTHROCYTE EXTRAVASATION WITH NEUTROPHILS. These findings could be seen secondary to an older trauma, or resolving erythema multiforme, a fixed drug eruption, Edgar-Christopher syndrome, or toxic epidermal necrolysis. Favor Multifocal Fixed Drug Eruption    - Dermatology contacted (9/20) about results, recommended adding vancomycin to allergy list and starting triamcinolone  - Continue triamcinolone 0.1% cream BID (9/20-current) per derm recs  - Plan for left breat suture removal around 9/25-9/27 (per derm, sutures from flank were dissolvable)  - On 9/25, concern from pt and nursing that rash is not improving. Under breasts and groin still causing pain. Bilateral upper thighs and forehead rash improving   - Wound care re-consulted 9/25   - Re-engaged dermatology on 9/25 given worsening rash; recommended to apply vasoline to all eroded/denuded areas and continue triamcinolone cream to both the black plaques as well as the red areas across the trunk and thighs. Per dermatology, erythema is mild and not palpable, it is most likely part of the drug eruption the patient has previously been known to have     #Leukocytosis , improving  - Likely I/s/o rash vs reactive vs infectious process  - leukocytosis noted on admission to MICU (WBC 24.8) --> 12.7 (9/25) --> 9.7 (10/1)  - leukocytosis improved throughout hospital stay however started uptrending 9/20; now improving on 9/25   - Blood cultures 9/10, 9/12, 9/15 all NGTD  - Strep and Legionella Ag, MRSA nares negative  - CXR (9/21) largely  unremarkable, again re-demonstrated perihilar prominence seen on prior XR  - Abdominal xray (9/21) unremarkable  - Repeat blood cultures x 2 (9/21) NGTD  - UA (9/21) with 500 LE, urine cx negative   - UA (9/30) +bacteria, +leuks, urine culture pending (9/29)     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  # ACS  - Previously managed through routine RBC exchanges q6 weeks, most recent RBCEx 3/1/24, per patient pausing on transfusions for time being  - OARRS reviewed, no aberrant behavior noted  - LDH>12,000 (now downtrending), haptoglobin <30, fibrinogen 318, retic % 5.2 on presentation, bili 10.7  - leukocytosis 24.8 on admission to MICU  - CXR (9/10) persistent atelectasis/scarring in the left lower lung  - CT CAP (9/11) Diffuse mosaic attenuation of the lung parenchyma, which can be seen in the setting of small airway disease, pulmonary edema or acute infection  - intubated upon arrival to MICU, now s/p extubation 9/16  - started on vanc and zosyn (finished both courses prior to floor transfer)  - Procal elevated 7.09 (9/11)  - Blood cultures 9/10, 9/12, 9/15 all NGTD  - Strep and Legionella Ag, MRSA nares negative  - trop peak 1431, cards rec treat as ACS (see below)  - S/p 2u pRBCs 9/10 on arrival to MICU  - S/p exchange transfusion 9/11 AM for ACS  - Care plan from 12/6/23- For mild to moderate pain: Dilaudid 0.5mg IVP q3h as needed, for moderate to severe pain Dilaudid 1- 1.5mg q3h PRN  - On arrival to [facility], started on 0.6mg dilaudid IVP q3h PRN severe pain   - s/p 0.6mg dilaudid IVP q4h PRN breakthrough and 15mg PO oxycodone Q4 hrs severe pain (9/25)  - Per discussion with Dr. Whaley (9/25), okay to increase PO oxycodone to 15-20mg Q4hr to optimize pain for discharge to SNF.   - On 9/26, discontinued IV dilaudid and increased to 20mg PO oxycodone q4hrs for severe pain   - Bowel regimen for opioid induced constipation, zofran for opioid induced nausea, and benadrly for opioid induced pruritus  - Opioids on hold as of 9/28  -  c/w home Duloxetine 40mg daily, PO Zofran PRN, Folic Acid 1mg daily, and MVI daily     # ST depression with tropinemia, suspected 2/2 demand ischemia iso ACS  # Hx SVT  - Elevated troponins on admission, peak 1431 and now downtrending   - EKG with ST depressions, likely Type II MI due to vaso-occlusive crisis and anemia  - TTE 9/11: EF 60-65%, RV enlarged and reduced in function which appears stable when compared to last TTE (1/2024)  - Troponin leak is likely due to cardiac demand vs supply mismatch in the setting of worsening anemia and vaso-occlusive crisis  - Cardiology consulted, now signed off with indication to treat troponemia as ACS  - Lasix held in MICU 2/2 hypotension and CHARLES, can resume as able   - Follow-up with Cardiology outpt, FUV 2/13/25     # Acute liver injury, improving  # Direct hyperbilirubinuria, improving  - Suspected 2/2 hemodynamic instability versus sickle cell crisis  - On presentation to  MICU ALT 2611, AST 4727, T bili 10.2, INR 3.0; continues to improve  - CT with hepatic venous congestion, patient lacks gallbladder  - Liver US with Doppler: Diffuse fatty infiltration, unremarkable doppler interrogation of the liver  - EBV IgG positive, IgM negative  - CMV IgG positive, PCR negative  - Acute hepatitis panel unremarkable  - Hepatology initially following, now signed off  - Will continue to monitor, trend daily CMP     # pHTN   - Initial c/f CTEPH as imaging findings with dilated PA, filling defects, and mosaicism  - VQ scan (6/13/23) with non anatomical basal defects and RUL defect due to scar--> not favoring CTEPH per Dr. Yi and Dr. 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 Delacruz as outpatient  - no current issues      # DISPO  - Full code- confirmed on admission  - NOK: Tati Salgado (mother) (#[phone])  - PT/OT rec SNF, plan for rehab at [facility]  - FUV PCP 10/1 (will request reschedule), Anim 10/7, Cardiology 2/13       I spent 60 minutes in the professional and overall care of this patient.    Assessment and plan as above discussed with attending physician, IRMA ArevaloC

## 2024-10-02 LAB
ALBUMIN SERPL BCP-MCNC: 2.5 G/DL (ref 3.4–5)
ALP SERPL-CCNC: 142 U/L (ref 33–110)
ALT SERPL W P-5'-P-CCNC: 24 U/L (ref 7–45)
ANION GAP BLDV CALCULATED.4IONS-SCNC: 5 MMOL/L (ref 10–25)
ANION GAP SERPL CALC-SCNC: 14 MMOL/L (ref 10–20)
APTT PPP: 60 SECONDS (ref 27–38)
AST SERPL W P-5'-P-CCNC: 29 U/L (ref 9–39)
BASE EXCESS BLDV CALC-SCNC: 6.1 MMOL/L (ref -2–3)
BASOPHILS # BLD AUTO: 0.02 X10*3/UL (ref 0–0.1)
BASOPHILS NFR BLD AUTO: 0.3 %
BILIRUB SERPL-MCNC: 2.3 MG/DL (ref 0–1.2)
BODY TEMPERATURE: 37 DEGREES CELSIUS
BUN SERPL-MCNC: 21 MG/DL (ref 6–23)
CA-I BLDV-SCNC: 1.26 MMOL/L (ref 1.1–1.33)
CALCIUM SERPL-MCNC: 8.8 MG/DL (ref 8.6–10.6)
CHLORIDE BLDV-SCNC: 108 MMOL/L (ref 98–107)
CHLORIDE SERPL-SCNC: 106 MMOL/L (ref 98–107)
CO2 SERPL-SCNC: 30 MMOL/L (ref 21–32)
CREAT SERPL-MCNC: 1.4 MG/DL (ref 0.5–1.05)
EGFRCR SERPLBLD CKD-EPI 2021: 45 ML/MIN/1.73M*2
EOSINOPHIL # BLD AUTO: 0.04 X10*3/UL (ref 0–0.7)
EOSINOPHIL NFR BLD AUTO: 0.5 %
ERYTHROCYTE [DISTWIDTH] IN BLOOD BY AUTOMATED COUNT: 20.4 % (ref 11.5–14.5)
GLUCOSE BLDV-MCNC: 75 MG/DL (ref 74–99)
GLUCOSE SERPL-MCNC: 70 MG/DL (ref 74–99)
HCO3 BLDV-SCNC: 31.2 MMOL/L (ref 22–26)
HCT VFR BLD AUTO: 25 % (ref 36–46)
HCT VFR BLD EST: 30 % (ref 36–46)
HGB BLD-MCNC: 7.9 G/DL (ref 12–16)
HGB BLDV-MCNC: 9.9 G/DL (ref 12–16)
HGB RETIC QN: 31 PG (ref 28–38)
IMM GRANULOCYTES # BLD AUTO: 0.08 X10*3/UL (ref 0–0.7)
IMM GRANULOCYTES NFR BLD AUTO: 1 % (ref 0–0.9)
IMMATURE RETIC FRACTION: 13.7 %
INHALED O2 CONCENTRATION: 28 %
INR PPP: 6.8 (ref 0.9–1.1)
LACTATE BLDV-SCNC: 1.1 MMOL/L (ref 0.4–2)
LACTATE SERPL-SCNC: 0.9 MMOL/L (ref 0.4–2)
LDH SERPL L TO P-CCNC: 206 U/L (ref 84–246)
LYMPHOCYTES # BLD AUTO: 1.24 X10*3/UL (ref 1.2–4.8)
LYMPHOCYTES NFR BLD AUTO: 15.7 %
MCH RBC QN AUTO: 29 PG (ref 26–34)
MCHC RBC AUTO-ENTMCNC: 31.6 G/DL (ref 32–36)
MCV RBC AUTO: 92 FL (ref 80–100)
MONOCYTES # BLD AUTO: 1.27 X10*3/UL (ref 0.1–1)
MONOCYTES NFR BLD AUTO: 16.1 %
NEUTROPHILS # BLD AUTO: 5.25 X10*3/UL (ref 1.2–7.7)
NEUTROPHILS NFR BLD AUTO: 66.4 %
NRBC BLD-RTO: 25.8 /100 WBCS (ref 0–0)
OXYHGB MFR BLDV: 91.4 % (ref 45–75)
PAPPENHEIMER BOD BLD QL SMEAR: PRESENT
PCO2 BLDV: 47 MM HG (ref 41–51)
PH BLDV: 7.43 PH (ref 7.33–7.43)
PLATELET # BLD AUTO: 325 X10*3/UL (ref 150–450)
PO2 BLDV: 67 MM HG (ref 35–45)
POLYCHROMASIA BLD QL SMEAR: NORMAL
POTASSIUM BLDV-SCNC: 4.5 MMOL/L (ref 3.5–5.3)
POTASSIUM SERPL-SCNC: 4.4 MMOL/L (ref 3.5–5.3)
PROT SERPL-MCNC: 5.8 G/DL (ref 6.4–8.2)
PROTHROMBIN TIME: 78.7 SECONDS (ref 9.8–12.8)
RBC # BLD AUTO: 2.72 X10*6/UL (ref 4–5.2)
RBC MORPH BLD: NORMAL
RETICS #: 0.39 X10*6/UL (ref 0.02–0.08)
RETICS/RBC NFR AUTO: 14.2 % (ref 0.5–2)
SAO2 % BLDV: 94 % (ref 45–75)
SODIUM BLDV-SCNC: 140 MMOL/L (ref 136–145)
SODIUM SERPL-SCNC: 146 MMOL/L (ref 136–145)
TARGETS BLD QL SMEAR: NORMAL
WBC # BLD AUTO: 7.9 X10*3/UL (ref 4.4–11.3)

## 2024-10-02 PROCEDURE — 2500000004 HC RX 250 GENERAL PHARMACY W/ HCPCS (ALT 636 FOR OP/ED)

## 2024-10-02 PROCEDURE — 1170000001 HC PRIVATE ONCOLOGY ROOM DAILY

## 2024-10-02 PROCEDURE — 85045 AUTOMATED RETICULOCYTE COUNT: CPT

## 2024-10-02 PROCEDURE — 85730 THROMBOPLASTIN TIME PARTIAL: CPT

## 2024-10-02 PROCEDURE — 82435 ASSAY OF BLOOD CHLORIDE: CPT

## 2024-10-02 PROCEDURE — 2500000005 HC RX 250 GENERAL PHARMACY W/O HCPCS: Performed by: NURSE PRACTITIONER

## 2024-10-02 PROCEDURE — 2500000001 HC RX 250 WO HCPCS SELF ADMINISTERED DRUGS (ALT 637 FOR MEDICARE OP)

## 2024-10-02 PROCEDURE — 99233 SBSQ HOSP IP/OBS HIGH 50: CPT

## 2024-10-02 PROCEDURE — 85610 PROTHROMBIN TIME: CPT

## 2024-10-02 PROCEDURE — 94660 CPAP INITIATION&MGMT: CPT

## 2024-10-02 PROCEDURE — 83615 LACTATE (LD) (LDH) ENZYME: CPT

## 2024-10-02 PROCEDURE — 85025 COMPLETE CBC W/AUTO DIFF WBC: CPT | Performed by: NURSE PRACTITIONER

## 2024-10-02 PROCEDURE — 36415 COLL VENOUS BLD VENIPUNCTURE: CPT

## 2024-10-02 PROCEDURE — 83605 ASSAY OF LACTIC ACID: CPT

## 2024-10-02 RX ORDER — OXYCODONE HYDROCHLORIDE 5 MG/1
2.5 TABLET ORAL EVERY 4 HOURS PRN
Status: DISCONTINUED | OUTPATIENT
Start: 2024-10-02 | End: 2024-10-05

## 2024-10-02 RX ORDER — OXYCODONE HYDROCHLORIDE 10 MG/1
10 TABLET ORAL EVERY 4 HOURS PRN
Status: DISCONTINUED | OUTPATIENT
Start: 2024-10-02 | End: 2024-10-02

## 2024-10-02 ASSESSMENT — COGNITIVE AND FUNCTIONAL STATUS - GENERAL
DRESSING REGULAR LOWER BODY CLOTHING: A LITTLE
WALKING IN HOSPITAL ROOM: A LOT
MOVING FROM LYING ON BACK TO SITTING ON SIDE OF FLAT BED WITH BEDRAILS: A LITTLE
MOBILITY SCORE: 13
TURNING FROM BACK TO SIDE WHILE IN FLAT BAD: A LITTLE
MOVING TO AND FROM BED TO CHAIR: A LOT
TOILETING: A LOT
MOBILITY SCORE: 12
STANDING UP FROM CHAIR USING ARMS: A LOT
HELP NEEDED FOR BATHING: A LITTLE
DRESSING REGULAR UPPER BODY CLOTHING: A LITTLE
CLIMB 3 TO 5 STEPS WITH RAILING: TOTAL
DAILY ACTIVITIY SCORE: 18
TURNING FROM BACK TO SIDE WHILE IN FLAT BAD: A LITTLE
MOVING FROM LYING ON BACK TO SITTING ON SIDE OF FLAT BED WITH BEDRAILS: A LITTLE
PERSONAL GROOMING: A LITTLE
STANDING UP FROM CHAIR USING ARMS: A LOT
CLIMB 3 TO 5 STEPS WITH RAILING: TOTAL
HELP NEEDED FOR BATHING: A LITTLE
WALKING IN HOSPITAL ROOM: TOTAL
DRESSING REGULAR UPPER BODY CLOTHING: A LITTLE
MOVING TO AND FROM BED TO CHAIR: A LOT
TOILETING: A LOT
DAILY ACTIVITIY SCORE: 19
DRESSING REGULAR LOWER BODY CLOTHING: A LITTLE

## 2024-10-02 ASSESSMENT — PAIN SCALES - GENERAL
PAINLEVEL_OUTOF10: 8
PAINLEVEL_OUTOF10: 7
PAINLEVEL_OUTOF10: 7

## 2024-10-02 ASSESSMENT — PAIN - FUNCTIONAL ASSESSMENT: PAIN_FUNCTIONAL_ASSESSMENT: 0-10

## 2024-10-02 NOTE — PROGRESS NOTES
Physical Therapy                 Therapy Communication Note    Patient Name: Senait Narvaez  MRN: [id number]  Department: [facility]  Room: [address] [place] [address]  Today's Date: 10/2/2024     Discipline: Physical Therapy    Missed Visit Reason: Missed Visit Reason: Other (Comment)    Missed Time: Attempt    Comment: Will hold PT today due high INR 6.8, which is a contraindication for physical therapy 2/2 to the high risk for excessive bleeding.

## 2024-10-02 NOTE — SIGNIFICANT EVENT
Rapid Response Nurse Note: [] Rapid Response [x] RADAR alert: Score 6    Pager time:   Arrival time:   Event end time:   Location: [place] [id number]  [x] Phone triage     Rapid response initiated by:  [] Rapid Response RN [] Family [] Nursing Supervisor [] Physician   [x] RADAR auto-page [] Sepsis auto-page [] RN [] RT   [] NP/PA [] Other:     Primary reason for call:   [] BAT [] New CPAP/BiPAP [] Bleeding [] Change in mental status   [] Chest pain [] Code blue [] FiO2 >/= 50% [] HR </= 40 bpm   [] HR >/= 130 bpm [] Hyperglycemia [] Hypoglycemia [x] RADAR    [] RR </= 8 bpm [] RR >/= 30 bpm [] SBP </= 90 mmHg [] SpO2 < 90%   [] Seizure [] Sepsis [] Staff concern:     Initial VS and/or RADAR VS: T 36.5 °C; ; RR 18; BP 98/63; SPO2 94%.  Providers present at bedside (if applicable):   Objective/Subjective data relevant to event (if applicable):   Interventions:  [x] None [] ABG [] Assist w/ICU transfer [] BAT paged    [] Bag mask [] Blood [] Cardioversion [] Code Blue   [] Code blue for intubation [] Code status changed [] Chest x-ray [] EKG   [] IV fluid/bolus [] KUB x-ray [] Labs/cultures [] Medication   [] Nebulizer treatment [] NIPPV (CPAP/BiPAP) [] Oxygen [] Oral airway   [] Peripheral IV [] Palliative care consult [] CT/MRI [] Sepsis protocol    [] Suctioned [] Other:     Name of ICU Provider contacted (if applicable):   Outcome:  [] Coded and  [] Code blue for intubation [] Coded and transferred to ICU []  on division   [] Remained on division (no change) [] Remained on division + additional monitoring [] Remained in ED [] Transferred to ED   [] Transferred to ICU [] Transferred to inpatient status [] Transferred for interventions (procedure) [] Transferred to ICU stepdown    [] Transferred to surgery [] Transferred to telemetry [] Sepsis protocol [] STEMI protocol   [] Stroke protocol [x] Bedside nurse instructed to page rapid response for any concerns or acute change in condition/VS      Additional Comments:

## 2024-10-02 NOTE — CARE PLAN
Patient will remain HDS and VSS by 10/2/24 at 0700    Problem: Pain - Adult  Goal: Verbalizes/displays adequate comfort level or baseline comfort level  Outcome: Progressing     Problem: Safety - Adult  Goal: Free from fall injury  Outcome: Progressing     Problem: Skin  Goal: Decreased wound size/increased tissue granulation at next dressing change  Outcome: Progressing  Flowsheets (Taken 9/29/2024 1233 by Marlene Juares RN)  Decreased wound size/increased tissue granulation at next dressing change: Promote sleep for wound healing  Goal: Participates in plan/prevention/treatment measures  Outcome: Progressing  Flowsheets (Taken 10/2/2024 0504)  Participates in plan/prevention/treatment measures: Elevate heels  Goal: Prevent/manage excess moisture  Outcome: Progressing  Flowsheets (Taken 9/29/2024 1233 by Marelne Juares RN)  Prevent/manage excess moisture: Follow provider orders for dressing changes  Goal: Prevent/minimize sheer/friction injuries  Outcome: Progressing  Flowsheets (Taken 10/2/2024 0504)  Prevent/minimize sheer/friction injuries: Turn/reposition every 2 hours/use positioning/transfer devices  Goal: Promote/optimize nutrition  Outcome: Progressing  Flowsheets (Taken 10/2/2024 0504)  Promote/optimize nutrition: Offer water/supplements/favorite foods  Goal: Promote skin healing  Outcome: Progressing  Flowsheets (Taken 10/2/2024 0504)  Promote skin healing: Turn/reposition every 2 hours/use positioning/transfer devices     Problem: Knowledge Deficit  Goal: Patient/family/caregiver demonstrates understanding of disease process, treatment plan, medications, and discharge instructions  Outcome: Progressing     Problem: Mechanical Ventilation  Goal: Patient Will Maintain Patent Airway  Outcome: Progressing  Goal: Oral health is maintained or improved  Outcome: Progressing  Goal: Tracheostomy will be managed safely  Outcome: Progressing  Goal: ET tube will be managed safely  Outcome: Progressing  Goal:  Ability to express needs and understand communication  Outcome: Progressing  Goal: Mobility/activity is maintained at optimum level for patient  Outcome: Progressing     Problem: Pain  Goal: Takes deep breaths with improved pain control throughout the shift  Outcome: Progressing  Goal: Turns in bed with improved pain control throughout the shift  Outcome: Progressing  Goal: Walks with improved pain control throughout the shift  Outcome: Progressing  Goal: Performs ADL's with improved pain control throughout shift  Outcome: Progressing  Goal: Participates in PT with improved pain control throughout the shift  Outcome: Progressing  Goal: Free from opioid side effects throughout the shift  Outcome: Progressing  Goal: Free from acute confusion related to pain meds throughout the shift  Outcome: Progressing     Problem: Nutrition  Goal: Less than 5 days NPO/clear liquids  Outcome: Progressing  Goal: Oral intake greater than 50%  Outcome: Progressing  Goal: Oral intake greater 75%  Outcome: Progressing  Goal: Consume prescribed supplement  Outcome: Progressing  Goal: Adequate PO fluid intake  Outcome: Progressing  Goal: Nutrition support goals are met within 48 hrs  Outcome: Progressing  Goal: Nutrition support is meeting 75% of nutrient needs  Outcome: Progressing  Goal: Tube feed tolerance  Outcome: Progressing  Goal: BG  mg/dL  Outcome: Progressing  Goal: Lab values WNL  Outcome: Progressing  Goal: Electrolytes WNL  Outcome: Progressing  Goal: Promote healing  Outcome: Progressing  Goal: Maintain stable weight  Outcome: Progressing  Goal: Reduce weight from edema/fluid  Outcome: Progressing  Goal: Gradual weight gain  Outcome: Progressing  Goal: Improve ostomy output  Outcome: Progressing     Problem: Fall/Injury  Goal: Not fall by end of shift  Outcome: Progressing  Goal: Be free from injury by end of the shift  Outcome: Progressing  Goal: Verbalize understanding of personal risk factors for fall in the  hospital  Outcome: Progressing  Goal: Verbalize understanding of risk factor reduction measures to prevent injury from fall in the home  Outcome: Progressing  Goal: Use assistive devices by end of the shift  Outcome: Progressing  Goal: Pace activities to prevent fatigue by end of the shift  Outcome: Progressing

## 2024-10-02 NOTE — PROGRESS NOTES
"Senait Narvaez is a 54 y.o. female on day 22 of admission presenting with Sickle cell disease with crisis and other complication.    Subjective   Patient seen resting in bed. Patient is far more alert today 10/2 and A&Ox3. She states that she feels improved today and doesn't recall events from the past couple of days. She reports 8/10 pain today in b/l LE today. We discussed improving renal function and labs today. Also discussed supra therapeutic INR and plan for PT/OT to see her today for SNF for discharge. She denies shortness of breath, chest pain, abdominal pain, N/V/D, F/C.        Objective     Physical Exam  Constitutional:       Appearance: Normal appearance.   HENT:      Mouth/Throat:      Mouth: Mucous membranes are moist.   Eyes:      Pupils: Pupils are equal, round, and reactive to light.   Cardiovascular:      Rate and Rhythm: Normal rate and regular rhythm.      Pulses: Normal pulses.      Heart sounds: Normal heart sounds.   Pulmonary:      Effort: Pulmonary effort is normal.      Breath sounds: Normal breath sounds.      Comments: 2L NC in place  Abdominal:      General: Abdomen is flat. Bowel sounds are normal.      Palpations: Abdomen is soft.   Musculoskeletal:         General: Normal range of motion.      Cervical back: Normal range of motion and neck supple.   Skin:     General: Skin is warm.   Neurological:      General: No focal deficit present.      Mental Status: She is alert.   Psychiatric:         Mood and Affect: Mood normal.         Last Recorded Vitals  Blood pressure 114/75, pulse 99, temperature 36.5 °C (97.7 °F), temperature source Temporal, resp. rate 18, height 1.651 m (5' 5\"), weight 120 kg (264 lb 12.4 oz), SpO2 94%.  Intake/Output last 3 Shifts:  I/O last 3 completed shifts:  In: 150 (1.2 mL/kg) [P.O.:150]  Out: 0 (0 mL/kg)   Weight: 120.1 kg     Relevant Results                 Scheduled medications  folic acid, 1 mg, oral, Daily  [Held by provider] metoprolol tartrate, 12.5 mg, " oral, BID  nystatin, 1 Application, Topical, BID  [Held by provider] oxyCODONE ER, 10 mg, oral, q12h BEATA  oxygen, , inhalation, Continuous - Inhalation  pantoprazole, 40 mg, intravenous, Daily  polyethylene glycol, 17 g, oral, BID  sennosides, 2 tablet, oral, BID  thiamine, 100 mg, intravenous, Daily  triamcinolone, , Topical, BID  [Held by provider] warfarin, 5 mg, oral, Daily  white petrolatum, , Topical, BID      Continuous medications     PRN medications  PRN medications: acetaminophen, albuterol, alteplase, bisacodyl, dextrose, dextrose, glucagon, glucagon, naloxone, ondansetron, oxyCODONE, [Held by provider] oxyCODONE, oxygen, sodium chloride               Assessment/Plan   Assessment & Plan  Sickle cell disease with crisis and other complication      Senait Narvaez is a 54 year-old female with a PMHx of Hgb SC disease (previously on exchange transfusions q4-6 weeks, last transfusion 3/1/24), hx of SVC syndrome, 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 Patient was subsequently intubated due to worsening lethargic and lack of respiratory compensation from significant metabolic acidosis with concern for ACS. Vancomycin and Zosyn started for empiric coverage, patient also started on pressors for BP support. Patient was transfused 2 units pRBCs and then underwent RBCEx on 9/11. Cardiology consulted for c/f NSTEMI vs demand ischemia (ST depressions and elevated troponin), rec treat as ACS. Course further c/b severe CHARLES with peak sCr of 5.4, and acute liver injury with severely elevated liver enzymes (ALT 4119, AST >10k). Liver US only remarkable for fatty infiltration, viral hepatitis panel negative. CHARLES and liver injury improved with supportive management. Pruritic rash noted 9/14,  Derm consulted and took multiple biopsies 9/15. Rash then began to worsen to involve the groin, lateral thigh, and scalp with numerous tense bullae. Discontinued heparin given concerns for potential HIT, started bivalirudin. Patient was extubated on 9/16/2024. Started stress dose steroids given continued pressor requirements, pressors Dc'd 9/17.     Patient transferred to [facility] 9/17, PT/OT rec SNF. Coumadin bridge was started 9/18. Given persistent rash with unremarkable labs, derm re-biopsied on 9/18, findings ultimately felt to be most c/w fixed drug eruption. Dermatology contacted for bx results (9/20) rec triamcinolone cream BID and interdry cloths. Leukocytosis uptrending 9/21, repeat infectious workup negative (CXR, Abd XR, blood cultures, MRSA negative 9/21). UA with leuks, urine culture negative (9/21). On 9/25, per nursing and patient, rash seems to be worsening. Derm re-engaged recommended to apply vasoline to all eroded/denuded areas and continue triamcinolone cream to both the black plaques as well as the red areas across the trunk and thighs. 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 Wound care following. For warfarin dosing, per pharmacy, if INR between 2-3 can restart home warfarin 5mg daily, if INR < 2 restart bival. INR 3.4 on 9/25, warfarin stopped, repeat INR 2.8 9/26 and warfarin restarted. On 9/26, increased PO oxycodone and discontinued IV dilaudid in anticipation for discharge. Overnight on 9/29, brain attack called out of concern for change in mental status, no focal deficits noted, BAT cancelled and narcan administered x3 with improvement of mental status. As of 10/2, mental status improving, AMS likely due to opioids i/s/o worsening renal function and c/b respiratory acidosis which is now resolved. Nephrology re-engaged on 9/29, rec that CHARLES likely pre-renal v. ATN given persistent hypotension  during admission. As of 10/2 renal function continues to improve. INR continues to be supra therapeutic at 6.8 (10/1 and 10/2), no acitve bleeding, per attending and pharmacy, continue to hold coumadin, last dose of coumadin 9/29 evening. DC to SNF pending improved pain, improved PT/PTT/INR, improved renal function.      # Waxing and waning AMS  - felt to be due to opioids i/s/o worsening renal function and c/b respiratory acidosis  - Overnight on 9/29, brain attack called out of concern for change in mental status, no focal deficits noted, BAT cancelled and narcan administered x3 with improvement of mental status  - opioids held (9/28-10/2)   - 10/2 resume oxycodone at smaller dose of oxycodone 2.5mg q4 hours given improvement of mental status   - UA - +leuks, bacteria, 21-50 WBC; urine culture: likely contaminate (pt with purewick) (10/1)  - Repeat UA and culture pending 10/2 as urine culture from 9/30 showed likely contaminate   - keep supplemental O2 on board, but titrate to maintain SPO2 of 90-92% at most  - on CPAP at home, continue nightly during admission       # Prolonged PT/PTT/INR  # DVT/ PE/ SVC Thrombus  - Hx SVC stricture s/p SVC angioplasty  - B/l Upper Extremity and Facial Swelling d/t partial filling defect within the SVC and a thrombus of the SVC complicated by bilateral Mediport catheters. On lifelong anticoagulation, DOAC discouraged due to high risk of clotting   - home Coumadin 5mg daily initially HELD on admission to MICU 2/2 unstable course  - restarted coumadin 9/18 PM, will plan to bridge with bival, Ok'd by heme   - 9/20 INR 3.8, bival and home warfarin held. Pharmacy rec repeat INR 4 hours after bival was held. If the INR < 2, we can restart the bival, but if the INR 2-3, we can continue with warfarin monotherapy   - 9/20 repeat INR 3.7, pharmacy rec continuing to hold both medications  - 9/21 INR 3.2, per pharmacy - get repeat INR in AM 9/22, if INR between 2-3 can restart home warfarin  5mg daily  - INR 2.6 (9/24) after restarting Warfarin 5mg, will maintain INR goal of 2-3  - INR 3.4 (9/25). Held warfarin on 9/25. Repeat INR 9/26. Plan to restart if <3   - INR 2.8 (9/26), restarted warfarin, recheck INR 9/27   - INR 4.0 (9/30), held warfarin, repeat INR 10/1 ordered, plan to restart if <3  - INR 6.8 (10/1), patient without any active bleeding, per attending and pharmacy, warfarin held   - INR 6.8 (10/2), patient without any active bleeding, per attending and pharmacy, warfarin held   - Follows with Coumadin clinic outpatient  - Plan to consult vascular medicine if INR is not therapeutic on home regimen      # CHARLES on CKD II, improving  - Baseline ~ SCr <2, peak 5.4 this admission. 9/21 sCr 1.58 --> 1.13 (9/25) --> 1.72 (10/1)   - likely pre-renal vs ATN given persistant hypotension over admission, on CKD II, now polyuric phase  - FeNA 0.4%  - s/p 500mL bolus 9/29 with improvement of sCr  - UA - +leuks, bacteria, 21-50 WBC; urine culture: likely contaminate (pt with purewick) (10/1)  - continue to hold home oxycodone given decreased renal excretion   - 10/2 resume oxycodone at smaller dose of oxycodone 2.5mg q4 hours given improvement of mental status and renal function   - encourage increased PO intake  - Nephrology following and rec ikely pre-renal vs ATN given persistant hypotension over admission, on CKD II, now polyuric phase, continue to hold oxycodone      #Respiratory acidosis  - ABG 7.27, pCO2 63, pO2 85 HCO3 29 on 9/29   - repeat VBG after resumed home CPAP showed improvement of acidosis: 7.43, pCO2 47, HCO3 31.2 (10/2)   - on CPAP at home, continue nightly during admission      #Purpuric rash, suspected fixed drug eruption  #Concern for possible heparin-induced thrombocytopenia (HIT) -resolved  - Purple, reticular pattern of plaques noted around the bilateral breasts, scalp and groin,  with tense bullae. Non-erythematous, no purulence  - Some initial concern for possible heparin-induced  thrombocytopenia with concurrent skin findings. Discontinued heparin 9/16 and transitioned to bivalirudin  - 4 T score 0, PF4 w/ reflex BINA negative. Hematology ultimately consulted, felt there was low concern for HIT and signed off  - Differential diagnosis includes purpura secondary to infection versus coagulopathy versus vasculopathy versus small and/or medium vessel vasculitis versus calciphylaxis vs drug rash  - Dermatology consulted, work-up to date:  - Low Protein C activity  - ANCA, PR3 and MPO negative  - ISABELLE neg  - cryoglobulin labs- not enough specimen to analyze  - S/p skin punch biopsies x 2 9/15 and one on 9/18, showed SUPERFICIAL EPIDERMAL DEGENERATION WITH ERYTHROCYTE EXTRAVASATION WITH NEUTROPHILS. These findings could be seen secondary to an older trauma, or resolving erythema multiforme, a fixed drug eruption, Edgar-Christopher syndrome, or toxic epidermal necrolysis. Favor Multifocal Fixed Drug Eruption    - Dermatology contacted (9/20) about results, recommended adding vancomycin to allergy list and starting triamcinolone  - Continue triamcinolone 0.1% cream BID (9/20-current) per derm recs  - Plan for left breat suture removal around 9/25-9/27 (per derm, sutures from flank were dissolvable)  - On 9/25, concern from pt and nursing that rash is not improving. Under breasts and groin still causing pain. Bilateral upper thighs and forehead rash improving   - Wound care re-consulted 9/25   - Re-engaged dermatology on 9/25 given worsening rash; recommended to apply vasoline to all eroded/denuded areas and continue triamcinolone cream to both the black plaques as well as the red areas across the trunk and thighs. Per dermatology, erythema is mild and not palpable, it is most likely part of the drug eruption the patient has previously been known to have     #Leukocytosis , resolved   - Likely I/s/o rash vs reactive vs infectious process  - leukocytosis noted on admission to MICU (WBC 24.8) --> 12.7  (9/25) --> 9.7 (10/1) --> 7.9 (10/2)   - leukocytosis improved throughout hospital stay however started uptrending 9/20; now improving on 9/25   - Blood cultures 9/10, 9/12, 9/15 all NGTD  - Strep and Legionella Ag, MRSA nares negative  - CXR (9/21) largely unremarkable, again re-demonstrated perihilar prominence seen on prior XR  - Abdominal xray (9/21) unremarkable  - Repeat blood cultures x 2 (9/21) NGTD  - UA (9/21) with 500 LE, urine cx negative   - UA - +leuks, bacteria, 21-50 WBC; urine culture: likely contaminate (pt with purewick) (10/1)  - Repeat UA and culture pending 10/2 as urine culture from 9/30 showed likely contaminate      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  # ACS  - Previously managed through routine RBC exchanges q6 weeks, most recent RBCEx 3/1/24, per patient pausing on transfusions for time being  - OARRS reviewed, no aberrant behavior noted  - LDH>12,000 (now downtrending), haptoglobin <30, fibrinogen 318, retic % 5.2 on presentation, bili 10.7  - leukocytosis 24.8 on admission to MICU  - CXR (9/10) persistent atelectasis/scarring in the left lower lung  - CT CAP (9/11) Diffuse mosaic attenuation of the lung parenchyma, which can be seen in the setting of small airway disease, pulmonary edema or acute infection  - intubated upon arrival to MICU, now s/p extubation 9/16  - started on vanc and zosyn (finished both courses prior to floor transfer)  - Procal elevated 7.09 (9/11)  - Blood cultures 9/10, 9/12, 9/15 all NGTD  - Strep and Legionella Ag, MRSA nares negative  - trop peak 1431, cards rec treat as ACS (see below)  - S/p 2u pRBCs 9/10 on arrival to MICU  - S/p exchange transfusion 9/11 AM for ACS  - Care plan from 12/6/23- For mild to moderate pain: Dilaudid 0.5mg IVP q3h as needed, for moderate to severe pain Dilaudid 1- 1.5mg q3h PRN  - [facility], started on 0.6mg dilaudid IVP q3h PRN severe pain   - s/p 0.6mg dilaudid IVP q4h PRN breakthrough and 15mg PO oxycodone Q4 hrs severe pain  (9/25)  - Per discussion with Dr. Whaley (9/25), okay to increase PO oxycodone to 15-20mg Q4hr to optimize pain for discharge to SNF.   - On 9/26, discontinued IV dilaudid and increased to 20mg PO oxycodone q4hrs for severe pain   - opioids held (9/28-10/2)   - 10/2 resume oxycodone at smaller dose of oxycodone 2.5mg q4 hours given improvement of mental status. Continue to hold oxycodone ER   - Bowel regimen for opioid induced constipation, zofran for opioid induced nausea, and benadrly for opioid induced pruritus  - Opioids on hold as of 9/28  - c/w home Duloxetine 40mg daily, PO Zofran PRN, Folic Acid 1mg daily, and MVI daily     # ST depression with tropinemia, suspected 2/2 demand ischemia iso ACS  # Hx SVT  - Elevated troponins on admission, peak 1431 and now downtrending   - EKG with ST depressions, likely Type II MI due to vaso-occlusive crisis and anemia  - TTE 9/11: EF 60-65%, RV enlarged and reduced in function which appears stable when compared to last TTE (1/2024)  - Troponin leak is likely due to cardiac demand vs supply mismatch in the setting of worsening anemia and vaso-occlusive crisis  - Cardiology consulted, now signed off with indication to treat troponemia as ACS  - Lasix held in MICU 2/2 hypotension and CHARLES, can resume as able   - Follow-up with Cardiology outpt, FUV 2/13/25     # Acute liver injury, improving  # Direct hyperbilirubinuria, improving  - Suspected 2/2 hemodynamic instability versus sickle cell crisis  - On presentation to  MICU ALT 2611, AST 4727, T bili 10.2, INR 3.0; continues to improve  - CT with hepatic venous congestion, patient lacks gallbladder  - Liver US with Doppler: Diffuse fatty infiltration, unremarkable doppler interrogation of the liver  - EBV IgG positive, IgM negative  - CMV IgG positive, PCR negative  - Acute hepatitis panel unremarkable  - Hepatology initially following, now signed off  - Will continue to monitor, trend daily CMP     # pHTN   - Initial c/f  CTEPH as imaging findings with dilated PA, filling defects, and mosaicism  - VQ scan (6/13/23) with non anatomical basal defects and RUL defect due to scar--> not favoring CTEPH per Dr. Yi and Dr. 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 Delacruz as outpatient  - no current issues      # DISPO  - Full code- confirmed on admission  - NOK: Tati Salgado (mother) (#[phone])  - PT/OT rec SNF, plan for rehab at [facility]  - FUV PCP 10/1 (will request reschedule), Anim 10/7, Cardiology 2/13       I spent 60 minutes in the professional and overall care of this patient.    Assessment and plan as above discussed with attending physician, Dr. Shellie Ferrell PA-C

## 2024-10-02 NOTE — SIGNIFICANT EVENT
Patient with improving CHARLES    (9/29) 2.93 --> 2.37 (9/30)-->1.72 (10/1)--> 1.40 (10/2)     Nephrology will sign off at this time.       Discussed case with Dr. Jorge (Nephrology)

## 2024-10-03 LAB
ABO GROUP (TYPE) IN BLOOD: NORMAL
ALBUMIN SERPL BCP-MCNC: 2.6 G/DL (ref 3.4–5)
ALP SERPL-CCNC: 135 U/L (ref 33–110)
ALT SERPL W P-5'-P-CCNC: 20 U/L (ref 7–45)
ANION GAP SERPL CALC-SCNC: 11 MMOL/L (ref 10–20)
ANTIBODY SCREEN: NORMAL
APPEARANCE UR: ABNORMAL
APTT PPP: 58 SECONDS (ref 27–38)
AST SERPL W P-5'-P-CCNC: 29 U/L (ref 9–39)
BACTERIA BLD CULT: NORMAL
BACTERIA BLD CULT: NORMAL
BASOPHILS # BLD AUTO: 0.02 X10*3/UL (ref 0–0.1)
BASOPHILS NFR BLD AUTO: 0.2 %
BILIRUB SERPL-MCNC: 2.2 MG/DL (ref 0–1.2)
BILIRUB UR STRIP.AUTO-MCNC: NEGATIVE MG/DL
BUN SERPL-MCNC: 14 MG/DL (ref 6–23)
CALCIUM SERPL-MCNC: 8.9 MG/DL (ref 8.6–10.6)
CHLORIDE SERPL-SCNC: 104 MMOL/L (ref 98–107)
CO2 SERPL-SCNC: 31 MMOL/L (ref 21–32)
COLOR UR: YELLOW
CREAT SERPL-MCNC: 1.14 MG/DL (ref 0.5–1.05)
EGFRCR SERPLBLD CKD-EPI 2021: 57 ML/MIN/1.73M*2
EOSINOPHIL # BLD AUTO: 0.1 X10*3/UL (ref 0–0.7)
EOSINOPHIL NFR BLD AUTO: 1.2 %
ERYTHROCYTE [DISTWIDTH] IN BLOOD BY AUTOMATED COUNT: 19.8 % (ref 11.5–14.5)
GLUCOSE SERPL-MCNC: 72 MG/DL (ref 74–99)
GLUCOSE UR STRIP.AUTO-MCNC: NORMAL MG/DL
HCT VFR BLD AUTO: 27 % (ref 36–46)
HGB BLD-MCNC: 8.8 G/DL (ref 12–16)
HGB RETIC QN: 30 PG (ref 28–38)
IMM GRANULOCYTES # BLD AUTO: 0.05 X10*3/UL (ref 0–0.7)
IMM GRANULOCYTES NFR BLD AUTO: 0.6 % (ref 0–0.9)
IMMATURE RETIC FRACTION: 22.5 %
INR PPP: 6.7 (ref 0.9–1.1)
KETONES UR STRIP.AUTO-MCNC: NEGATIVE MG/DL
LDH SERPL L TO P-CCNC: 284 U/L (ref 84–246)
LEUKOCYTE ESTERASE UR QL STRIP.AUTO: ABNORMAL
LYMPHOCYTES # BLD AUTO: 1.97 X10*3/UL (ref 1.2–4.8)
LYMPHOCYTES NFR BLD AUTO: 23.9 %
MCH RBC QN AUTO: 29.8 PG (ref 26–34)
MCHC RBC AUTO-ENTMCNC: 32.6 G/DL (ref 32–36)
MCV RBC AUTO: 92 FL (ref 80–100)
MONOCYTES # BLD AUTO: 1.07 X10*3/UL (ref 0.1–1)
MONOCYTES NFR BLD AUTO: 13 %
NEUTROPHILS # BLD AUTO: 5.04 X10*3/UL (ref 1.2–7.7)
NEUTROPHILS NFR BLD AUTO: 61.1 %
NITRITE UR QL STRIP.AUTO: NEGATIVE
NRBC BLD-RTO: 32.2 /100 WBCS (ref 0–0)
PH UR STRIP.AUTO: 6 [PH]
PLATELET # BLD AUTO: 363 X10*3/UL (ref 150–450)
POTASSIUM SERPL-SCNC: 4 MMOL/L (ref 3.5–5.3)
PROT SERPL-MCNC: 6 G/DL (ref 6.4–8.2)
PROT UR STRIP.AUTO-MCNC: ABNORMAL MG/DL
PROTHROMBIN TIME: 76.6 SECONDS (ref 9.8–12.8)
RBC # BLD AUTO: 2.95 X10*6/UL (ref 4–5.2)
RBC # UR STRIP.AUTO: ABNORMAL /UL
RBC #/AREA URNS AUTO: >20 /HPF
RETICS #: 0.39 X10*6/UL (ref 0.02–0.08)
RETICS/RBC NFR AUTO: 13.2 % (ref 0.5–2)
RH FACTOR (ANTIGEN D): NORMAL
SODIUM SERPL-SCNC: 142 MMOL/L (ref 136–145)
SP GR UR STRIP.AUTO: 1.01
SQUAMOUS #/AREA URNS AUTO: ABNORMAL /HPF
UROBILINOGEN UR STRIP.AUTO-MCNC: NORMAL MG/DL
WBC # BLD AUTO: 8.3 X10*3/UL (ref 4.4–11.3)
WBC #/AREA URNS AUTO: ABNORMAL /HPF

## 2024-10-03 PROCEDURE — 86901 BLOOD TYPING SEROLOGIC RH(D): CPT | Performed by: NURSE PRACTITIONER

## 2024-10-03 PROCEDURE — 2500000001 HC RX 250 WO HCPCS SELF ADMINISTERED DRUGS (ALT 637 FOR MEDICARE OP)

## 2024-10-03 PROCEDURE — 85730 THROMBOPLASTIN TIME PARTIAL: CPT

## 2024-10-03 PROCEDURE — 2500000005 HC RX 250 GENERAL PHARMACY W/O HCPCS: Performed by: NURSE PRACTITIONER

## 2024-10-03 PROCEDURE — 99233 SBSQ HOSP IP/OBS HIGH 50: CPT | Performed by: NURSE PRACTITIONER

## 2024-10-03 PROCEDURE — 1170000001 HC PRIVATE ONCOLOGY ROOM DAILY

## 2024-10-03 PROCEDURE — 85045 AUTOMATED RETICULOCYTE COUNT: CPT

## 2024-10-03 PROCEDURE — 36415 COLL VENOUS BLD VENIPUNCTURE: CPT

## 2024-10-03 PROCEDURE — 87086 URINE CULTURE/COLONY COUNT: CPT | Performed by: NURSE PRACTITIONER

## 2024-10-03 PROCEDURE — 81001 URINALYSIS AUTO W/SCOPE: CPT

## 2024-10-03 PROCEDURE — 85610 PROTHROMBIN TIME: CPT

## 2024-10-03 PROCEDURE — 83615 LACTATE (LD) (LDH) ENZYME: CPT

## 2024-10-03 PROCEDURE — 80053 COMPREHEN METABOLIC PANEL: CPT

## 2024-10-03 PROCEDURE — 85025 COMPLETE CBC W/AUTO DIFF WBC: CPT

## 2024-10-03 PROCEDURE — 2500000004 HC RX 250 GENERAL PHARMACY W/ HCPCS (ALT 636 FOR OP/ED): Performed by: NURSE PRACTITIONER

## 2024-10-03 PROCEDURE — 36415 COLL VENOUS BLD VENIPUNCTURE: CPT | Performed by: NURSE PRACTITIONER

## 2024-10-03 PROCEDURE — 2500000004 HC RX 250 GENERAL PHARMACY W/ HCPCS (ALT 636 FOR OP/ED)

## 2024-10-03 ASSESSMENT — PAIN SCALES - GENERAL
PAINLEVEL_OUTOF10: 7
PAINLEVEL_OUTOF10: 8
PAINLEVEL_OUTOF10: 9
PAINLEVEL_OUTOF10: 8
PAINLEVEL_OUTOF10: 8
PAINLEVEL_OUTOF10: 9
PAINLEVEL_OUTOF10: 8
PAINLEVEL_OUTOF10: 8

## 2024-10-03 ASSESSMENT — COGNITIVE AND FUNCTIONAL STATUS - GENERAL
MOBILITY SCORE: 11
WALKING IN HOSPITAL ROOM: TOTAL
DRESSING REGULAR UPPER BODY CLOTHING: A LITTLE
TOILETING: A LOT
CLIMB 3 TO 5 STEPS WITH RAILING: TOTAL
MOVING TO AND FROM BED TO CHAIR: A LOT
PERSONAL GROOMING: A LITTLE
DRESSING REGULAR LOWER BODY CLOTHING: A LOT
TURNING FROM BACK TO SIDE WHILE IN FLAT BAD: A LOT
DAILY ACTIVITIY SCORE: 16
STANDING UP FROM CHAIR USING ARMS: A LOT
MOVING FROM LYING ON BACK TO SITTING ON SIDE OF FLAT BED WITH BEDRAILS: A LITTLE
HELP NEEDED FOR BATHING: A LOT

## 2024-10-03 ASSESSMENT — PAIN - FUNCTIONAL ASSESSMENT
PAIN_FUNCTIONAL_ASSESSMENT: 0-10

## 2024-10-03 ASSESSMENT — PAIN DESCRIPTION - LOCATION: LOCATION: SHOULDER

## 2024-10-03 ASSESSMENT — PAIN DESCRIPTION - ORIENTATION: ORIENTATION: RIGHT;LEFT

## 2024-10-03 NOTE — CONSULTS
Wound Care Consult     Visit Date: 10/3/2024      Patient Name: Senait Narvaez         MRN: [id number]           YOB: 1970     Reason for Consult: vancomycin related skin wounds- need updated wound care         Pertinent Labs:   Albumin   Date Value Ref Range Status   10/03/2024 2.6 (L) 3.4 - 5.0 g/dL Final   02/16/2022 3.9 3.4 - 5.0 g/dL Final     ALBUMIN (MG/L) IN URINE   Date Value Ref Range Status   04/20/2022 10.7 Not Established mg/L Final     Albumin, CSF   Date Value Ref Range Status   09/10/2019 15 0 - 35 mg/dL Final     Albumin Index   Date Value Ref Range Status   09/10/2019 5.1 0.0 - 9.0 ratio Final     Albumin, Serum   Date Value Ref Range Status   09/10/2019 2,970 (L) 3,500 - 5,200 mg/dL Final     ALBUMIN-CSF - DATA CONVERSION   Date Value Ref Range Status   09/10/2019 13.4 8.4 - 34.2 mg/dL Final       Wound Assessment:  Wound 09/16/24 Rectum (Active)   Wound Image   10/01/24 0103   Wound Length (cm) 2 cm 09/16/24 1312   Wound Width (cm) 0.5 cm 09/16/24 1312   Wound Surface Area (cm^2) 1 cm^2 09/16/24 1312   Drainage Description None 10/01/24 2058   Drainage Amount None 10/01/24 2058   Dressing Open to air;Other (Comment) 10/02/24 2249   Dressing Changed New 09/26/24 1136   Dressing Status Clean;Occlusive 09/26/24 1136       Wound 09/18/24 Breast Left;Lower (Active)   Wound Image    10/01/24 0109   Drainage Description None 10/01/24 2058   Drainage Amount Scant 09/24/24 1151   Dressing Open to air 10/03/24 1100   Dressing Changed Reinforced 10/02/24 0945   Dressing Status Clean 10/01/24 0945       Wound 09/18/24 Breast Lateral;Lower;Right (Active)   Wound Image   10/01/24 0109   Drainage Description None 10/01/24 2058   Drainage Amount Small 09/24/24 1151   Dressing Open to air 10/03/24 1100   Dressing Changed Reinforced 10/02/24 0945   Dressing Status Old drainage 09/25/24 1954       Wound 09/24/24 Left (Active)   Wound Image   09/24/24 1051   Drainage Description None 10/01/24 2058    Drainage Amount None 10/01/24 2058   Dressing Open to air 10/02/24 2249       Wound 09/24/24 Right (Active)   Wound Image   09/24/24 1059   Drainage Description None 10/01/24 2058   Drainage Amount None 10/01/24 2058   Dressing Open to air 10/02/24 2249       Wound 09/24/24 Pretibial Distal;Right (Active)   Wound Image   09/24/24 1100   Drainage Description None 10/01/24 2058   Drainage Amount None 10/01/24 2058   Dressing Open to air 09/24/24 1151       Wound 09/24/24 Leg Left;Proximal;Upper;Anterior (Active)   Wound Image   10/01/24 0107   Drainage Description None 10/01/24 2058   Drainage Amount None 10/01/24 2058   Dressing Open to air 09/24/24 1151       Wound 10/01/24 Arm Left;Lower;Ventral (Active)   Wound Image   10/01/24 0111       Wound Team Summary Assessment: Patient was seen by dermatology and dermatology made recommendations for care.  Per bedside nurse, patient tolerating current regimen.  Recommend continuing care recommended by dermatology and reach out to dermatology for further questions/concerns     Wound Team Plan: Wound care to sign off at this time.  Wound care may be reconsulted if wound worsens or new area of concern appears.      Ynes Naranjo, MSN, RN, CWCN, COCN  10/03/24 4:54 PM

## 2024-10-03 NOTE — CARE PLAN
The patient's goals for the shift include      The clinical goals for the shift include rash will improve healing

## 2024-10-03 NOTE — CARE PLAN
The clinical goals for the shift include patient will remain HDS and VSS throughout shift, will remain free of pain at 0700      Problem: Safety - Adult  Goal: Free from fall injury  Outcome: Progressing     Problem: Pain - Adult  Goal: Verbalizes/displays adequate comfort level or baseline comfort level  Outcome: Progressing     Problem: Skin  Goal: Promote/optimize nutrition  Outcome: Progressing     Problem: Skin  Goal: Promote skin healing  Outcome: Progressing     Problem: Skin  Goal: Prevent/minimize sheer/friction injuries  Outcome: Progressing     Problem: Fall/Injury  Goal: Not fall by end of shift  Outcome: Progressing

## 2024-10-03 NOTE — PROGRESS NOTES
Physical Therapy                 Therapy Communication Note    Patient Name: Senait Narvaez  MRN: [id number]  Department: [facility]  Room: [facility]4024A  Today's Date: 10/3/2024     Discipline: Physical Therapy    Missed Visit Reason: Missed Visit Reason: Other (Comment)    Missed Time: Attempt    Comment: Will hold PT today due to pt's high INR 6.7 and increased risk for excessive bleeding making physical therapy contraindicative at this time.

## 2024-10-03 NOTE — PROGRESS NOTES
"Senait Narvaez is a 54 y.o. female on day 23 of admission presenting with Sickle cell disease with crisis and other complication.    Subjective   Seen this AM at the bedside. She reports that she is feeling well today, mentation improved. She is having some pain but the oxy is helping. She had some ?bleeding over night, vaginal vs wound excoriation. UA was + for blood. INR still elevated, discussed with pt plan for Vit K today. Also discussed plan for exchange prior to DC, early next week. She denies any fevers/chills, SOB, or CP.       Objective     Physical Exam  Constitutional:       Appearance: Normal appearance. She is obese.   HENT:      Mouth/Throat:      Mouth: Mucous membranes are moist.   Eyes:      Pupils: Pupils are equal, round, and reactive to light.   Cardiovascular:      Rate and Rhythm: Normal rate and regular rhythm.      Pulses: Normal pulses.      Heart sounds: Normal heart sounds.   Pulmonary:      Effort: Pulmonary effort is normal.      Breath sounds: Normal breath sounds.      Comments: 2L NC in place  Abdominal:      General: Abdomen is flat. Bowel sounds are normal.      Palpations: Abdomen is soft.   Musculoskeletal:         General: Normal range of motion.      Cervical back: Normal range of motion and neck supple.   Skin:     General: Skin is warm.   Neurological:      General: No focal deficit present.      Mental Status: She is alert.   Psychiatric:         Mood and Affect: Mood normal.         Last Recorded Vitals  Blood pressure 102/61, pulse 76, temperature 37.1 °C (98.8 °F), temperature source Temporal, resp. rate 16, height 1.651 m (5' 5\"), weight 120 kg (264 lb 12.4 oz), SpO2 100%.  Intake/Output last 3 Shifts:  No intake/output data recorded.    Results for orders placed or performed during the hospital encounter of 09/10/24 (from the past 24 hour(s))   Urinalysis with Reflex Microscopic   Result Value Ref Range    Color, Urine Yellow Light-Yellow, Yellow, Dark-Yellow    " Appearance, Urine Turbid (N) Clear    Specific Gravity, Urine 1.010 1.005 - 1.035    pH, Urine 6.0 5.0, 5.5, 6.0, 6.5, 7.0, 7.5, 8.0    Protein, Urine 20 (TRACE) NEGATIVE, 10 (TRACE), 20 (TRACE) mg/dL    Glucose, Urine Normal Normal mg/dL    Blood, Urine OVER (3+) (A) NEGATIVE    Ketones, Urine NEGATIVE NEGATIVE mg/dL    Bilirubin, Urine NEGATIVE NEGATIVE    Urobilinogen, Urine Normal Normal mg/dL    Nitrite, Urine NEGATIVE NEGATIVE    Leukocyte Esterase, Urine 250 Cande/µL (A) NEGATIVE   Microscopic Only, Urine   Result Value Ref Range    WBC, Urine 21-50 (A) 1-5, NONE /HPF    RBC, Urine >20 (A) NONE, 1-2, 3-5 /HPF    Squamous Epithelial Cells, Urine 1-9 (SPARSE) Reference range not established. /HPF   Lactate dehydrogenase   Result Value Ref Range     (H) 84 - 246 U/L   Reticulocytes   Result Value Ref Range    Retic % 13.2 (H) 0.5 - 2.0 %    Retic Absolute 0.390 (H) 0.018 - 0.083 x10*6/uL    Reticulocyte Hemoglobin 30 28 - 38 pg    Immature Retic fraction 22.5 (H) <=16.0 %   Comprehensive metabolic panel   Result Value Ref Range    Glucose 72 (L) 74 - 99 mg/dL    Sodium 142 136 - 145 mmol/L    Potassium 4.0 3.5 - 5.3 mmol/L    Chloride 104 98 - 107 mmol/L    Bicarbonate 31 21 - 32 mmol/L    Anion Gap 11 10 - 20 mmol/L    Urea Nitrogen 14 6 - 23 mg/dL    Creatinine 1.14 (H) 0.50 - 1.05 mg/dL    eGFR 57 (L) >60 mL/min/1.73m*2    Calcium 8.9 8.6 - 10.6 mg/dL    Albumin 2.6 (L) 3.4 - 5.0 g/dL    Alkaline Phosphatase 135 (H) 33 - 110 U/L    Total Protein 6.0 (L) 6.4 - 8.2 g/dL    AST 29 9 - 39 U/L    Bilirubin, Total 2.2 (H) 0.0 - 1.2 mg/dL    ALT 20 7 - 45 U/L   APTT   Result Value Ref Range    aPTT 58 (H) 27 - 38 seconds   Protime-INR   Result Value Ref Range    Protime 76.6 (HH) 9.8 - 12.8 seconds    INR 6.7 (HH) 0.9 - 1.1   CBC and Auto Differential   Result Value Ref Range    WBC 8.3 4.4 - 11.3 x10*3/uL    nRBC 32.2 (H) 0.0 - 0.0 /100 WBCs    RBC 2.95 (L) 4.00 - 5.20 x10*6/uL    Hemoglobin 8.8 (L) 12.0 -  16.0 g/dL    Hematocrit 27.0 (L) 36.0 - 46.0 %    MCV 92 80 - 100 fL    MCH 29.8 26.0 - 34.0 pg    MCHC 32.6 32.0 - 36.0 g/dL    RDW 19.8 (H) 11.5 - 14.5 %    Platelets 363 150 - 450 x10*3/uL    Neutrophils % 61.1 40.0 - 80.0 %    Immature Granulocytes %, Automated 0.6 0.0 - 0.9 %    Lymphocytes % 23.9 13.0 - 44.0 %    Monocytes % 13.0 2.0 - 10.0 %    Eosinophils % 1.2 0.0 - 6.0 %    Basophils % 0.2 0.0 - 2.0 %    Neutrophils Absolute 5.04 1.20 - 7.70 x10*3/uL    Immature Granulocytes Absolute, Automated 0.05 0.00 - 0.70 x10*3/uL    Lymphocytes Absolute 1.97 1.20 - 4.80 x10*3/uL    Monocytes Absolute 1.07 (H) 0.10 - 1.00 x10*3/uL    Eosinophils Absolute 0.10 0.00 - 0.70 x10*3/uL    Basophils Absolute 0.02 0.00 - 0.10 x10*3/uL     *Note: Due to a large number of results and/or encounters for the requested time period, some results have not been displayed. A complete set of results can be found in Results Review.               Scheduled medications  folic acid, 1 mg, oral, Daily  [Held by provider] metoprolol tartrate, 12.5 mg, oral, BID  nystatin, 1 Application, Topical, BID  [Held by provider] oxyCODONE ER, 10 mg, oral, q12h BEATA  oxygen, , inhalation, Continuous - Inhalation  pantoprazole, 40 mg, intravenous, Daily  phytonadione, 5 mg, intravenous, Once  polyethylene glycol, 17 g, oral, BID  sennosides, 2 tablet, oral, BID  thiamine, 100 mg, intravenous, Daily  triamcinolone, , Topical, BID  [Held by provider] warfarin, 5 mg, oral, Daily  white petrolatum, , Topical, BID      Continuous medications     PRN medications  PRN medications: acetaminophen, albuterol, alteplase, bisacodyl, dextrose, dextrose, glucagon, glucagon, naloxone, ondansetron, oxyCODONE, [Held by provider] oxyCODONE, oxygen, sodium chloride               Assessment/Plan   Assessment & Plan  Sickle cell disease with crisis and other complication  Senait Narvaez is a 53 year-old female with a PMHx of Hgb SC disease (previously on exchange transfusions  q4-6 weeks, last transfusion 3/1/24), hx of SVC syndrome, recurrent DVT/PE (on lifelong Coumadin), pHTN (6/2023), cauda equina with saddle numbness, hx SVT, fibromyalgia, Raynaud's disease, CKD II (baseline SCr~<2) and CAN who presented to [facility] ED for diffuse pain and lethargy, then transferred to  MICU for hemodynamic instability. Patient was subsequently intubated due to worsening lethargic and lack of respiratory compensation from significant metabolic acidosis with concern for ACS. Vancomycin and Zosyn started for empiric coverage, patient also started on pressors for BP support. Patient was transfused 2 units pRBCs and then underwent RBCEx on 9/11. Cardiology consulted for c/f NSTEMI vs demand ischemia (ST depressions and elevated troponin), rec treat as ACS. Course further c/b severe CHARLES with peak sCr of 5.4, and acute liver injury with severely elevated liver enzymes (ALT 4119, AST >10k). Liver US only remarkable for fatty infiltration, viral hepatitis panel negative. CHARLES and liver injury improved with supportive management. Pruritic rash noted 9/14, Derm consulted and took multiple biopsies 9/15. Rash then began to worsen to involve the groin, lateral thigh, and scalp with numerous tense bullae. Discontinued heparin given concerns for potential HIT, started bivalirudin. Patient was extubated on 9/16/2024. Started stress dose steroids given continued pressor requirements, pressors Dc'd 9/17. Patient transferred to [facility] 9/17, PT/OT rec SNF. Coumadin bridge was started 9/18. Given persistent rash with unremarkable labs, derm re-biopsied on 9/18, findings ultimately felt to be most c/w fixed drug eruption. Dermatology contacted for bx results (9/20) rec triamcinolone cream BID and interdry cloths. Leukocytosis uptrending 9/21, repeat infectious workup negative (CXR, Abd XR, blood cultures, MRSA negative 9/21). UA with leuks, urine culture pending (9/21). On 9/25, per nursing and patient, rash seems to be  worsening. Derm re-engaged recommended to apply vasoline to all eroded/denuded areas and continue triamcinolone cream to both the black plaques as well as the red areas across the trunk and thighs. 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 Wound care following. For warfarin dosing, per pharmacy, if INR between 2-3 can restart home warfarin 5mg daily, if INR < 2 restart bival. INR 3.4 on 9/25, warfarin stopped, repeat INR 2.8 9/26 and warfarin restarted. On 9/26, increased PO oxycodone and discontinued IV dilaudid in anticipation for discharge. Overnight on 9/29, brain attack called out of concern for change in mental status, no focal deficits noted, BAT cancelled and narcan administered x3 with improvement of mental status. Mentation improving as of 10/2, small dose oxy resumed. INR remains supratherapeutic 10/3 at 6.7, 5mg IV Vit K x1 given (ok per pharmacy) I/s/o ?bleeding from vagina vs wound excoriation and UA with +blood. Plan for RBCEx prior to dc per Dr. Whaley, likely Tuesday 8/8. DC to SNF pending improvement in pain, skin, INR, and RBCEx.    # Waxing and waning AMS-- improving  - Felt to be due to opioids i/s/o worsening renal function and c/b respiratory acidosis  - All opioids on hold (9/28-10/2 resumed small dose oxy)  - Keep supplemental O2 on board, but titrate to maintain SPO2 of 90-92% at most  - On CPAP at home, enforce nightly use to extent possible; may need BiPAP if unresolved    # CHARLES on CKD II, improving  - Baseline ~ SCr <2, peak 5.4 this admission. 9/21 sCr 1.58 --> 1.13 (9/25)--> 1.14 (10/3)  - Likely pre-renal vs ATN given persistant hypotension over admission, on CKD II, now polyuric phase  - FeNA 0.4%  - Severe hyperkalemia with previous peaked T waves on EKG, resolved  - Nephrology following  - s/p multiple K cocktails in MICU  - Encourage increased PO intake     # Purpuric rash, suspected  fixed drug eruption  # Concern for possible heparin-induced thrombocytopenia (HIT) -resolved  - Purple, reticular pattern of plaques noted around the bilateral breasts, scalp and groin,  with tense bullae. Non-erythematous, no purulence  - Some initial concern for possible heparin-induced thrombocytopenia with concurrent skin findings. Discontinued heparin 9/16 and transitioned to bivalirudin  - 4 T score 0, PF4 w/ reflex BINA negative. Hematology ultimately consulted, felt there was low concern for HIT and signed off  - Differential diagnosis includes purpura secondary to infection versus coagulopathy versus vasculopathy versus small and/or medium vessel vasculitis versus calciphylaxis vs drug rash  - Dermatology consulted, work-up to date:  - Low Protein C activity  - ANCA, PR3 and MPO negative  - ISABELLE neg  - cryoglobulin labs- not enough specimen to analyze  - S/p skin punch biopsies x 2 9/15 and one on 9/18, showed SUPERFICIAL EPIDERMAL DEGENERATION WITH ERYTHROCYTE EXTRAVASATION WITH NEUTROPHILS. These findings could be seen secondary to an older trauma, or resolving erythema multiforme, a fixed drug eruption, Edgar-Christopher syndrome, or toxic epidermal necrolysis. Favor Multifocal Fixed Drug Eruption    - Dermatology contacted (9/20) about results, recommended adding vancomycin to allergy list and starting triamcinolone  - Continue triamcinolone 0.1% cream BID (9/20-current) per derm recs  - Plan for left breat suture removal around 9/25-9/27 (per derm, sutures from flank were dissolvable)  - On 9/25, concern from pt and nursing that rash is not improving. Under breasts and groin still causing pain. Bilateral upper thighs and forehead rash improving   - Wound care re-consulted 9/25   - Re-engaged dermatology on 9/25 given worsening rash; recommended to apply vasoline to all eroded/denuded areas and continue triamcinolone cream to both the black plaques as well as the red areas across the trunk and thighs. Per  dermatology, erythema is mild and not palpable, it is most likely part of the drug eruption the patient has previously been known to have     # Leukocytosis , improving  - Likely I/s/o rash vs reactive vs infectious process  - Leukocytosis noted on admission to MICU (WBC 24.8) --> 12.7 (9/25) --> 8.3 (10/3)  - Leukocytosis improved throughout hospital stay however started uptrending 9/20; now improving on 9/25   - Blood cultures 9/10, 9/12, 9/15 all NGTD  - Strep and Legionella Ag, MRSA nares negative  - CXR (9/21) largely unremarkable, again re-demonstrated perihilar prominence seen on prior XR  - Abdominal xray (9/21) unremarkable  - Repeat blood cultures x 2 (9/21) NGTD  - UA (9/21) with 500 LE, urine cx pending  - UA (9/30) +bacteria, +leuks, urine culture (9/29) mult organisms  - UA sent 10/3 with +blood, leuks, and WBC- not reflex to cx so UCX added on to 10/3 UA and pending    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  # ACS  - Previously managed through routine RBC exchanges q6 weeks, most recent RBCEx 3/1/24, per patient pausing on transfusions for time being  - OARRS reviewed, no aberrant behavior noted  - LDH>12,000 (now downtrending), haptoglobin <30, fibrinogen 318, retic % 5.2 on presentation, bili 10.7  - Leukocytosis 24.8 on admission to MICU  - CXR (9/10) persistent atelectasis/scarring in the left lower lung  - CT CAP (9/11) Diffuse mosaic attenuation of the lung parenchyma, which can be seen in the setting of small airway disease, pulmonary edema or acute infection  - Intubated upon arrival to MICU, now s/p extubation 9/16  - Started on vanc and zosyn (finished both courses prior to floor transfer)  - Procal elevated 7.09 (9/11)  - Blood cultures 9/10, 9/12, 9/15 all NGTD  - Strep and Legionella Ag, MRSA nares negative  - Trop peak 1431, cards rec treat as ACS (see below)  - S/p 2u pRBCs 9/10 on arrival to MICU  - S/p exchange transfusion 9/11 AM for ACS  - Care plan from 12/6/23- For mild to moderate pain:  Dilaudid 0.5mg IVP q3h as needed, for moderate to severe pain Dilaudid 1- 1.5mg q3h PRN  - On arrival to [facility], started on 0.6mg dilaudid IVP q3h PRN severe pain   - s/p 0.6mg dilaudid IVP q4h PRN breakthrough and 15mg PO oxycodone Q4 hrs severe pain (9/25)  - Per discussion with Dr. Whaley (9/25), okay to increase PO oxycodone to 15-20mg Q4hr to optimize pain for discharge to SNF.   - On 9/26, discontinued IV dilaudid and increased to 20mg PO oxycodone q4hrs for severe pain   - Opioids on hold as of 9/28 d/t AMS- resumed 2.5mg oxy q4hrs PRN severe pain on 10/2. ER oxy 10mg BID still held  - Bowel regimen for opioid induced constipation, zofran for opioid induced nausea, and benadrly for opioid induced pruritus  - c/w home Duloxetine 40mg daily, PO Zofran PRN, Folic Acid 1mg daily, and MVI daily     # ST depression with tropinemia, suspected 2/2 demand ischemia iso ACS  # Hx SVT  - Elevated troponins on admission, peak 1431 and now downtrending   - EKG with ST depressions, likely Type II MI due to vaso-occlusive crisis and anemia  - TTE 9/11: EF 60-65%, RV enlarged and reduced in function which appears stable when compared to last TTE (1/2024)  - Troponin leak is likely due to cardiac demand vs supply mismatch in the setting of worsening anemia and vaso-occlusive crisis  - Cardiology consulted, now signed off with indication to treat troponemia as ACS  - Lasix held in MICU 2/2 hypotension and CHARLES, can resume as able   - Follow-up with Cardiology outpt, FUV 2/13/25     # Acute liver injury, improving  # Direct hyperbilirubinuria, improving  - Suspected 2/2 hemodynamic instability versus sickle cell crisis  - On presentation to  MICU ALT 2611, AST 4727, T bili 10.2, INR 3.0; continues to improve  - CT with hepatic venous congestion, patient lacks gallbladder  - Liver US with Doppler: Diffuse fatty infiltration, unremarkable doppler interrogation of the liver  - EBV IgG positive, IgM negative  - CMV IgG positive, PCR  negative  - Acute hepatitis panel unremarkable  - Hepatology initially following, now signed off  - Will continue to monitor, trend daily CMP     # pHTN   - Initial c/f CTEPH as imaging findings with dilated PA, filling defects, and mosaicism  - VQ scan (6/13/23) with non anatomical basal defects and RUL defect due to scar--> not favoring CTEPH per Dr. Yi and Dr. 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 On lifelong anticoagulation, DOAC discouraged due to high risk of clotting   - home Coumadin 5mg daily initially HELD on admission to MICU 2/2 unstable course  - restarted coumadin 9/18 PM, will plan to bridge with bival, Ok'd by heme   - 9/20 INR 3.8, bival and home warfarin held. Pharmacy rec repeat INR 4 hours after bival was held. If the INR < 2, we can restart the bival, but if the INR 2-3, we can continue with warfarin monotherapy   - 9/20 repeat INR 3.7, pharmacy rec continuing to hold both medications  - 9/21 INR 3.2, per pharmacy - get repeat INR in AM 9/22, if INR between 2-3 can restart home warfarin 5mg daily  -INR 2.6 (9/24) after restarting Warfarin 5mg, will maintain INR goal of 2-3  - INR 3.4 (9/25). Held warfarin on 9/25. Repeat INR 9/26. Plan to restart if <3   - INR 2.8 (9/26), restarted warfarin, recheck INR 9/27   - INR 4.0 (9/30), held warfarin, repeat INR 10/1 ordered, plan to restart if <3  - INR 6.8  (10/1), patient without any active bleeding, per attending and pharmacy, warfarin held   - INR 6.8 (10/2), patient without any active bleeding, per attending and pharmacy, warfarin held   - INR 6.7 (10/3), cont to hold warfarin. Ordered 5mg IV Vit K x1 (ok per pharmacy) d/t ?bleeding, vaginal vs wound excoriation and UA +blood- per pharmacy ok to give additional 5mg IV Vit K tomorrow 10/4 if INR still elevated  - Follows with Coumadin clinic outpatient  - Plan to consult vascular medicine if INR is not therapeutic on home regimen      # CAN  - cont home CPAP   - cont home Albuterol PRN     # H/O Cauda Equine syndrome  - Follows Dr. Delacruz as outpatient  - no current issues      # DISPO  - Full code- confirmed on admission  - NOK: Tati Salgado (mother) (#[phone])  - PT/OT rec SNF, plan for rehab at [facility]  - FUV PCP 10/1, Anim 10/7, Cardiology 2/13

## 2024-10-03 NOTE — PROGRESS NOTES
Art Therapy Note    Senait Narvaez     Therapy Session  Referral Type: New referral this admission  Visit Type: Follow-up visit  Session Start Time: 1451  Session End Time: 1452  Intervention Delivery: In-person  Conflict of Service: Working with other staff  Number of staff members present: 2              Treatment/Interventions       Post-assessment  Total Session Time (min): 1 minutes    Narrative  Assessment Detail: ATR made  a visit to Pt.;s room to follow up and offer an AT session and begin work per last vist's doscussion.  Pt lying in bed with a room full of visitors talking with other providers.  ATR will follwo up another time with Pt. to offer services    Education Documentation  No documentation found.

## 2024-10-03 NOTE — PROGRESS NOTES
Occupational Therapy                 Therapy Communication Note    Patient Name: Senait Narvaez  MRN: [id number]  Department: [facility]  Room: [facility]  Today's Date: 10/3/2024     Discipline: Occupational Therapy    Missed Visit Reason: Missed Visit Reason:  (Hold, pt with INR 6.7)    Missed Time: Attempt; will follow up as medically appropriate.    10/03/24 at 10:59 AM   Lizbeth Gaitan OT   Rehab Office: [phone]

## 2024-10-03 NOTE — PROGRESS NOTES
09/24/24 1300   Discharge Planning   Who is requesting discharge planning? Patient   Home or Post Acute Services Post acute facilities (Rehab/SNF/etc)   Type of Post Acute Facility Services Skilled nursing   Expected Discharge Disposition SNF     Care Transitions Note  09/24/24  Met with Senait and  Tati ([phone]) at bedside to discuss discharge planning. [facility] is able to accept as long as patient is off IV pain meds. Team aware, plan was to wean off IV dilaudid tomorrow per rounds this morning. Will send updated clinicals from today.    Senait and Tati voiced concerns about Senait's rash. Tati would like the rash to improve before going to SNF. Tati states Dr. Whaley plans to check in with them. This LSW sent message to TEVIN Sahni outpatient SW to update her. Tati had questions about SNF providing injections or oral pain meds, LSW sent message to [facility] for more info. Mother also adds that Senait needs to be admitted for exchanges every 3-4 weeks, wonders how this would be handled if still at SNF. LSW will update team and follow for needs that arise.   YULI Zamorano     UPDATE 09/27/24  Per medical team, Senait's rash is stable and improving, although not totally better. They discussed with Senait and Tati that it will take time to heal. She has weaned off IV pain meds, now on oxy. Plan to go to SNF on Monday. LSW to start pre-cert for [facility].     UPDATE 09/30/24  Per team, patient is not medically ready. Pre-cert was not started for [facility], updates sent to facility. Following for discharge needs as patient improves.  YULI Zamorano     UPDATE 10/01/24  Patient had AMS, CHARLES, over the last few days which are beginning to improve per rounds this morning. Pain meds currently on hold. Updates sent to [facility]. [facility] may be later in the week pending further improvement. Will follow.   YULI Zamorano     UPDATE 10/03/24  Patient AMS and CHARLES are improving, but  team wants to see more improvement before discharge. Patient also needs routine exchange before leaving, which will be next week. DC pending improvement in INR and MS. Updates sent to [facility].   YULI Zamorano

## 2024-10-03 NOTE — ASSESSMENT & PLAN NOTE
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 Patient was subsequently intubated due to worsening lethargic and lack of respiratory compensation from significant metabolic acidosis with concern for ACS. Vancomycin and Zosyn started for empiric coverage, patient also started on pressors for BP support. Patient was transfused 2 units pRBCs and then underwent RBCEx on 9/11. Cardiology consulted for c/f NSTEMI vs demand ischemia (ST depressions and elevated troponin), rec treat as ACS. Course further c/b severe CHARLES with peak sCr of 5.4, and acute liver injury with severely elevated liver enzymes (ALT 4119, AST >10k). Liver US only remarkable for fatty infiltration, viral hepatitis panel negative. CHARLES and liver injury improved with supportive management. Pruritic rash noted 9/14, Derm consulted and took multiple biopsies 9/15. Rash then began to worsen to involve the groin, lateral thigh, and scalp with numerous tense bullae. Discontinued heparin given concerns for potential HIT, started bivalirudin. Patient was extubated on 9/16/2024. Started stress dose steroids given continued pressor requirements, pressors Dc'd 9/17. Patient transferred to [facility] 9/17, PT/OT rec SNF. Coumadin bridge was started 9/18. Given persistent rash with unremarkable labs, derm re-biopsied on 9/18, findings ultimately felt to be most c/w fixed drug eruption. Dermatology contacted for bx results (9/20) rec triamcinolone cream BID and interdry cloths. Leukocytosis uptrending 9/21, repeat infectious workup negative (CXR, Abd XR, blood cultures, MRSA negative 9/21).  UA with leuks, urine culture pending (9/21). On 9/25, per nursing and patient, rash seems to be worsening. Derm re-engaged recommended to apply vasoline to all eroded/denuded areas and continue triamcinolone cream to both the black plaques as well as the red areas across the trunk and thighs. 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 Wound care following. For warfarin dosing, per pharmacy, if INR between 2-3 can restart home warfarin 5mg daily, if INR < 2 restart bival. INR 3.4 on 9/25, warfarin stopped, repeat INR 2.8 9/26 and warfarin restarted. On 9/26, increased PO oxycodone and discontinued IV dilaudid in anticipation for discharge. Overnight on 9/29, brain attack called out of concern for change in mental status, no focal deficits noted, BAT cancelled and narcan administered x3 with improvement of mental status. Mentation improving as of 10/2, small dose oxy resumed. INR remains supratherapeutic 10/3 at 6.7, 5mg IV Vit K x1 given (ok per pharmacy) I/s/o ?bleeding from vagina vs wound excoriation and UA with +blood. Plan for RBCEx prior to dc per Dr. Whaley, likely Tuesday 8/8. DC to SNF pending improvement in pain, skin, INR, and RBCEx.    # Waxing and waning AMS-- improving  - Felt to be due to opioids i/s/o worsening renal function and c/b respiratory acidosis  - All opioids on hold (9/28-10/2 resumed small dose oxy)  - Keep supplemental O2 on board, but titrate to maintain SPO2 of 90-92% at most  - On CPAP at home, enforce nightly use to extent possible; may need BiPAP if unresolved    # CHARLES on CKD II, improving  - Baseline ~ SCr <2, peak 5.4 this admission. 9/21 sCr 1.58 --> 1.13 (9/25)--> 1.14 (10/3)  - Likely pre-renal vs ATN given persistant hypotension over admission, on CKD II, now polyuric phase  - FeNA 0.4%  - Severe hyperkalemia with previous peaked T waves on EKG, resolved  - Nephrology following  -  s/p multiple K cocktails in MICU  - Encourage increased PO intake     # Purpuric rash, suspected fixed drug eruption  # Concern for possible heparin-induced thrombocytopenia (HIT) -resolved  - Purple, reticular pattern of plaques noted around the bilateral breasts, scalp and groin,  with tense bullae. Non-erythematous, no purulence  - Some initial concern for possible heparin-induced thrombocytopenia with concurrent skin findings. Discontinued heparin 9/16 and transitioned to bivalirudin  - 4 T score 0, PF4 w/ reflex BINA negative. Hematology ultimately consulted, felt there was low concern for HIT and signed off  - Differential diagnosis includes purpura secondary to infection versus coagulopathy versus vasculopathy versus small and/or medium vessel vasculitis versus calciphylaxis vs drug rash  - Dermatology consulted, work-up to date:  - Low Protein C activity  - ANCA, PR3 and MPO negative  - ISABELLE neg  - cryoglobulin labs- not enough specimen to analyze  - S/p skin punch biopsies x 2 9/15 and one on 9/18, showed SUPERFICIAL EPIDERMAL DEGENERATION WITH ERYTHROCYTE EXTRAVASATION WITH NEUTROPHILS. These findings could be seen secondary to an older trauma, or resolving erythema multiforme, a fixed drug eruption, Edgar-Christopher syndrome, or toxic epidermal necrolysis. Favor Multifocal Fixed Drug Eruption    - Dermatology contacted (9/20) about results, recommended adding vancomycin to allergy list and starting triamcinolone  - Continue triamcinolone 0.1% cream BID (9/20-current) per derm recs  - Plan for left breat suture removal around 9/25-9/27 (per derm, sutures from flank were dissolvable)  - On 9/25, concern from pt and nursing that rash is not improving. Under breasts and groin still causing pain. Bilateral upper thighs and forehead rash improving   - Wound care re-consulted 9/25   - Re-engaged dermatology on 9/25 given worsening rash; recommended to apply vasoline to all eroded/denuded areas and continue  triamcinolone cream to both the black plaques as well as the red areas across the trunk and thighs. Per dermatology, erythema is mild and not palpable, it is most likely part of the drug eruption the patient has previously been known to have     # Leukocytosis , improving  - Likely I/s/o rash vs reactive vs infectious process  - Leukocytosis noted on admission to MICU (WBC 24.8) --> 12.7 (9/25) --> 8.3 (10/3)  - Leukocytosis improved throughout hospital stay however started uptrending 9/20; now improving on 9/25   - Blood cultures 9/10, 9/12, 9/15 all NGTD  - Strep and Legionella Ag, MRSA nares negative  - CXR (9/21) largely unremarkable, again re-demonstrated perihilar prominence seen on prior XR  - Abdominal xray (9/21) unremarkable  - Repeat blood cultures x 2 (9/21) NGTD  - UA (9/21) with 500 LE, urine cx pending  - UA (9/30) +bacteria, +leuks, urine culture (9/29) mult organisms  - UA sent 10/3 with +blood, leuks, and WBC- not reflex to cx so UCX added on to 10/3 UA and pending    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  # ACS  - Previously managed through routine RBC exchanges q6 weeks, most recent RBCEx 3/1/24, per patient pausing on transfusions for time being  - OARRS reviewed, no aberrant behavior noted  - LDH>12,000 (now downtrending), haptoglobin <30, fibrinogen 318, retic % 5.2 on presentation, bili 10.7  - Leukocytosis 24.8 on admission to MICU  - CXR (9/10) persistent atelectasis/scarring in the left lower lung  - CT CAP (9/11) Diffuse mosaic attenuation of the lung parenchyma, which can be seen in the setting of small airway disease, pulmonary edema or acute infection  - Intubated upon arrival to MICU, now s/p extubation 9/16  - Started on vanc and zosyn (finished both courses prior to floor transfer)  - Procal elevated 7.09 (9/11)  - Blood cultures 9/10, 9/12, 9/15 all NGTD  - Strep and Legionella Ag, MRSA nares negative  - Trop peak 1431, cards rec treat as ACS (see below)  - S/p 2u pRBCs 9/10 on arrival to  MICU  - S/p exchange transfusion 9/11 AM for ACS  - Care plan from 12/6/23- For mild to moderate pain: Dilaudid 0.5mg IVP q3h as needed, for moderate to severe pain Dilaudid 1- 1.5mg q3h PRN  - On arrival to [facility], started on 0.6mg dilaudid IVP q3h PRN severe pain   - s/p 0.6mg dilaudid IVP q4h PRN breakthrough and 15mg PO oxycodone Q4 hrs severe pain (9/25)  - Per discussion with Dr. Whaley (9/25), okay to increase PO oxycodone to 15-20mg Q4hr to optimize pain for discharge to SNF.   - On 9/26, discontinued IV dilaudid and increased to 20mg PO oxycodone q4hrs for severe pain   - Opioids on hold as of 9/28 d/t AMS- resumed 2.5mg oxy q4hrs PRN severe pain on 10/2. ER oxy 10mg BID still held  - Bowel regimen for opioid induced constipation, zofran for opioid induced nausea, and benadrly for opioid induced pruritus  - c/w home Duloxetine 40mg daily, PO Zofran PRN, Folic Acid 1mg daily, and MVI daily     # ST depression with tropinemia, suspected 2/2 demand ischemia iso ACS  # Hx SVT  - Elevated troponins on admission, peak 1431 and now downtrending   - EKG with ST depressions, likely Type II MI due to vaso-occlusive crisis and anemia  - TTE 9/11: EF 60-65%, RV enlarged and reduced in function which appears stable when compared to last TTE (1/2024)  - Troponin leak is likely due to cardiac demand vs supply mismatch in the setting of worsening anemia and vaso-occlusive crisis  - Cardiology consulted, now signed off with indication to treat troponemia as ACS  - Lasix held in MICU 2/2 hypotension and CHARLES, can resume as able   - Follow-up with Cardiology outpt, FUV 2/13/25     # Acute liver injury, improving  # Direct hyperbilirubinuria, improving  - Suspected 2/2 hemodynamic instability versus sickle cell crisis  - [facility], T bili 10.2, INR 3.0; continues to improve  - CT with hepatic venous congestion, patient lacks gallbladder  - Liver US with Doppler: Diffuse fatty infiltration,  unremarkable doppler interrogation of the liver  - EBV IgG positive, IgM negative  - CMV IgG positive, PCR negative  - Acute hepatitis panel unremarkable  - Hepatology initially following, now signed off  - Will continue to monitor, trend daily CMP     # pHTN   - Initial c/f CTEPH as imaging findings with dilated PA, filling defects, and mosaicism  - VQ scan (6/13/23) with non anatomical basal defects and RUL defect due to scar--> not favoring CTEPH per Dr. Yi and Dr. 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 On lifelong anticoagulation, DOAC discouraged due to high risk of clotting   - home Coumadin 5mg daily initially HELD on admission to MICU 2/2 unstable course  - restarted coumadin 9/18 PM, will plan to bridge with bival, Ok'd by heme   - 9/20 INR 3.8, bival and home warfarin held. Pharmacy rec repeat INR 4 hours after bival was held. If the INR < 2, we can restart the bival, but if the INR 2-3, we can continue with warfarin monotherapy   - 9/20 repeat INR 3.7, pharmacy rec continuing to hold both medications  - 9/21 INR 3.2, per pharmacy - get repeat INR in AM 9/22, if INR between 2-3 can restart home warfarin 5mg daily  -INR 2.6 (9/24) after restarting Warfarin 5mg, will maintain INR goal of 2-3  - INR 3.4 (9/25). Held warfarin on 9/25. Repeat INR 9/26. Plan to restart if <3   - INR 2.8 (9/26), restarted warfarin,  recheck INR 9/27   - INR 4.0 (9/30), held warfarin, repeat INR 10/1 ordered, plan to restart if <3  - INR 6.8 (10/1), patient without any active bleeding, per attending and pharmacy, warfarin held   - INR 6.8 (10/2), patient without any active bleeding, per attending and pharmacy, warfarin held   - INR 6.7 (10/3), cont to hold warfarin. Ordered 5mg IV Vit K x1 (ok per pharmacy) d/t ?bleeding, vaginal vs wound excoriation and UA +blood- per pharmacy ok to give additional 5mg IV Vit K tomorrow 10/4 if INR still elevated  - Follows with Coumadin clinic outpatient  - Plan to consult vascular medicine if INR is not therapeutic on home regimen      # CAN  - cont home CPAP   - cont home Albuterol PRN     # H/O Cauda Equine syndrome  - Follows Dr. Delacruz as outpatient  - no current issues      # DISPO  - Full code- confirmed on admission  - NOK: Tati Salgado (mother) (#[phone])  - PT/OT rec SNF, plan for rehab at [facility]  - FUV PCP 10/1, Anim 10/7, Cardiology 2/13

## 2024-10-04 ENCOUNTER — SOCIAL WORK (OUTPATIENT)
Dept: HEMATOLOGY/ONCOLOGY | Facility: HOSPITAL | Age: 54
End: 2024-10-04
Payer: COMMERCIAL

## 2024-10-04 LAB
ALBUMIN SERPL BCP-MCNC: 2.2 G/DL (ref 3.4–5)
ALP SERPL-CCNC: 125 U/L (ref 33–110)
ALT SERPL W P-5'-P-CCNC: 16 U/L (ref 7–45)
ANION GAP SERPL CALC-SCNC: 9 MMOL/L (ref 10–20)
APTT PPP: 29 SECONDS (ref 27–38)
AST SERPL W P-5'-P-CCNC: 25 U/L (ref 9–39)
BACTERIA UR CULT: ABNORMAL
BASOPHILS # BLD AUTO: 0.02 X10*3/UL (ref 0–0.1)
BASOPHILS NFR BLD AUTO: 0.2 %
BILIRUB SERPL-MCNC: 2.2 MG/DL (ref 0–1.2)
BUN SERPL-MCNC: 12 MG/DL (ref 6–23)
CALCIUM SERPL-MCNC: 8.7 MG/DL (ref 8.6–10.6)
CHLORIDE SERPL-SCNC: 103 MMOL/L (ref 98–107)
CO2 SERPL-SCNC: 32 MMOL/L (ref 21–32)
CREAT SERPL-MCNC: 1.02 MG/DL (ref 0.5–1.05)
EGFRCR SERPLBLD CKD-EPI 2021: 65 ML/MIN/1.73M*2
EOSINOPHIL # BLD AUTO: 0.15 X10*3/UL (ref 0–0.7)
EOSINOPHIL NFR BLD AUTO: 1.8 %
ERYTHROCYTE [DISTWIDTH] IN BLOOD BY AUTOMATED COUNT: 18.9 % (ref 11.5–14.5)
GLUCOSE SERPL-MCNC: 78 MG/DL (ref 74–99)
HCT VFR BLD AUTO: 26 % (ref 36–46)
HGB BLD-MCNC: 8.5 G/DL (ref 12–16)
HGB RETIC QN: 30 PG (ref 28–38)
IMM GRANULOCYTES # BLD AUTO: 0.06 X10*3/UL (ref 0–0.7)
IMM GRANULOCYTES NFR BLD AUTO: 0.7 % (ref 0–0.9)
IMMATURE RETIC FRACTION: 26.8 %
INR PPP: 1.5 (ref 0.9–1.1)
LDH SERPL L TO P-CCNC: 216 U/L (ref 84–246)
LYMPHOCYTES # BLD AUTO: 1.26 X10*3/UL (ref 1.2–4.8)
LYMPHOCYTES NFR BLD AUTO: 14.8 %
MAGNESIUM SERPL-MCNC: 1.55 MG/DL (ref 1.6–2.4)
MCH RBC QN AUTO: 30 PG (ref 26–34)
MCHC RBC AUTO-ENTMCNC: 32.7 G/DL (ref 32–36)
MCV RBC AUTO: 92 FL (ref 80–100)
MONOCYTES # BLD AUTO: 1.01 X10*3/UL (ref 0.1–1)
MONOCYTES NFR BLD AUTO: 11.8 %
NEUTROPHILS # BLD AUTO: 6.04 X10*3/UL (ref 1.2–7.7)
NEUTROPHILS NFR BLD AUTO: 70.7 %
NRBC BLD-RTO: 32.2 /100 WBCS (ref 0–0)
PLATELET # BLD AUTO: 350 X10*3/UL (ref 150–450)
POTASSIUM SERPL-SCNC: 3.8 MMOL/L (ref 3.5–5.3)
PROT SERPL-MCNC: 5.4 G/DL (ref 6.4–8.2)
PROTHROMBIN TIME: 16.8 SECONDS (ref 9.8–12.8)
RBC # BLD AUTO: 2.83 X10*6/UL (ref 4–5.2)
RETICS #: 0.4 X10*6/UL (ref 0.02–0.08)
RETICS/RBC NFR AUTO: 14 % (ref 0.5–2)
SODIUM SERPL-SCNC: 140 MMOL/L (ref 136–145)
WBC # BLD AUTO: 8.5 X10*3/UL (ref 4.4–11.3)

## 2024-10-04 PROCEDURE — 1170000001 HC PRIVATE ONCOLOGY ROOM DAILY

## 2024-10-04 PROCEDURE — 85610 PROTHROMBIN TIME: CPT

## 2024-10-04 PROCEDURE — 2500000004 HC RX 250 GENERAL PHARMACY W/ HCPCS (ALT 636 FOR OP/ED)

## 2024-10-04 PROCEDURE — 36415 COLL VENOUS BLD VENIPUNCTURE: CPT | Performed by: NURSE PRACTITIONER

## 2024-10-04 PROCEDURE — 85730 THROMBOPLASTIN TIME PARTIAL: CPT

## 2024-10-04 PROCEDURE — 85025 COMPLETE CBC W/AUTO DIFF WBC: CPT | Performed by: NURSE PRACTITIONER

## 2024-10-04 PROCEDURE — 83615 LACTATE (LD) (LDH) ENZYME: CPT

## 2024-10-04 PROCEDURE — 2500000001 HC RX 250 WO HCPCS SELF ADMINISTERED DRUGS (ALT 637 FOR MEDICARE OP)

## 2024-10-04 PROCEDURE — 99233 SBSQ HOSP IP/OBS HIGH 50: CPT | Performed by: INTERNAL MEDICINE

## 2024-10-04 PROCEDURE — 97530 THERAPEUTIC ACTIVITIES: CPT | Mod: GP

## 2024-10-04 PROCEDURE — 2500000004 HC RX 250 GENERAL PHARMACY W/ HCPCS (ALT 636 FOR OP/ED): Performed by: INTERNAL MEDICINE

## 2024-10-04 PROCEDURE — 85045 AUTOMATED RETICULOCYTE COUNT: CPT

## 2024-10-04 PROCEDURE — 80053 COMPREHEN METABOLIC PANEL: CPT | Performed by: NURSE PRACTITIONER

## 2024-10-04 PROCEDURE — 2500000005 HC RX 250 GENERAL PHARMACY W/O HCPCS: Performed by: NURSE PRACTITIONER

## 2024-10-04 PROCEDURE — 83735 ASSAY OF MAGNESIUM: CPT | Performed by: NURSE PRACTITIONER

## 2024-10-04 RX ORDER — MAGNESIUM SULFATE HEPTAHYDRATE 40 MG/ML
2 INJECTION, SOLUTION INTRAVENOUS ONCE
Status: COMPLETED | OUTPATIENT
Start: 2024-10-04 | End: 2024-10-04

## 2024-10-04 RX ORDER — LANOLIN ALCOHOL/MO/W.PET/CERES
400 CREAM (GRAM) TOPICAL ONCE
Status: COMPLETED | OUTPATIENT
Start: 2024-10-04 | End: 2024-10-04

## 2024-10-04 RX ORDER — OXYCODONE HYDROCHLORIDE 5 MG/1
5 TABLET ORAL ONCE
Status: COMPLETED | OUTPATIENT
Start: 2024-10-04 | End: 2024-10-04

## 2024-10-04 ASSESSMENT — COGNITIVE AND FUNCTIONAL STATUS - GENERAL
MOVING TO AND FROM BED TO CHAIR: A LITTLE
DAILY ACTIVITIY SCORE: 21
MOBILITY SCORE: 20
WALKING IN HOSPITAL ROOM: TOTAL
STANDING UP FROM CHAIR USING ARMS: A LOT
TOILETING: A LITTLE
CLIMB 3 TO 5 STEPS WITH RAILING: TOTAL
DRESSING REGULAR LOWER BODY CLOTHING: A LITTLE
MOVING FROM LYING ON BACK TO SITTING ON SIDE OF FLAT BED WITH BEDRAILS: A LITTLE
MOBILITY SCORE: 11
MOBILITY SCORE: 18
CLIMB 3 TO 5 STEPS WITH RAILING: A LOT
TURNING FROM BACK TO SIDE WHILE IN FLAT BAD: A LITTLE
WALKING IN HOSPITAL ROOM: A LITTLE
MOVING TO AND FROM BED TO CHAIR: TOTAL
WALKING IN HOSPITAL ROOM: A LOT
CLIMB 3 TO 5 STEPS WITH RAILING: A LITTLE
STANDING UP FROM CHAIR USING ARMS: A LITTLE
DAILY ACTIVITIY SCORE: 23
STANDING UP FROM CHAIR USING ARMS: A LITTLE
MOVING TO AND FROM BED TO CHAIR: A LITTLE
TOILETING: A LITTLE
HELP NEEDED FOR BATHING: A LITTLE

## 2024-10-04 ASSESSMENT — PAIN DESCRIPTION - DESCRIPTORS
DESCRIPTORS: ACHING
DESCRIPTORS: ACHING

## 2024-10-04 ASSESSMENT — PAIN SCALES - GENERAL
PAINLEVEL_OUTOF10: 8
PAINLEVEL_OUTOF10: 8
PAINLEVEL_OUTOF10: 7
PAINLEVEL_OUTOF10: 10 - WORST POSSIBLE PAIN
PAINLEVEL_OUTOF10: 8

## 2024-10-04 ASSESSMENT — PAIN DESCRIPTION - LOCATION
LOCATION: LEG
LOCATION: LEG

## 2024-10-04 ASSESSMENT — PAIN - FUNCTIONAL ASSESSMENT
PAIN_FUNCTIONAL_ASSESSMENT: 0-10

## 2024-10-04 NOTE — PROGRESS NOTES
Spiritual Care Visit      visited with the patient and patient's mother, offer ongoing spiritual support. We spoke about their gratitude for the patient feeling better today and their hope the patient continues to feel better. Patient's mother shared the importance of their ongoing komal, the ways prayer has continued to guide their lives and offer hope no matter what is going on.     Spiritual care will continue to follow.     Rev. Dev Cheema, Supportive Oncology

## 2024-10-04 NOTE — PROGRESS NOTES
Physical Therapy    Physical Therapy Treatment    Patient Name: Senait Narvaez  MRN: [id number]  Department: [facility]  Room: [facility]4023-A  Today's Date: 10/4/2024  Time Calculation  Start Time: 0831  Stop Time: 0918  Time Calculation (min): 47 min         Assessment/Plan   PT Assessment  PT Assessment Results: Decreased strength, Decreased endurance, Impaired balance, Decreased mobility, Orthopedic restrictions, Pain  Rehab Prognosis: Good  Barriers to Discharge: none  Evaluation/Treatment Tolerance: Patient limited by fatigue, Patient limited by pain  Medical Staff Made Aware: Yes  Strengths: Attitude of self, Coping skills  Barriers to Participation: Comorbidities  End of Session Communication: Bedside nurse  Assessment Comment: The pt required decreased assistance with bed mobility, increased assistance with sit to stand transfer, and the inability to amb due to L LE pain, decreased Raul LE strength, and overall deconditioning.  End of Session Patient Position: Bed, 3 rail up, Alarm off, not on at start of session  PT Plan  Inpatient/Swing Bed or Outpatient: Inpatient  PT Plan  Treatment/Interventions: Bed mobility, Transfer training, Gait training, Stair training, Balance training, Strengthening, Endurance training, Therapeutic exercise, Therapeutic activity, Home exercise program  PT Plan: Ongoing PT  PT Frequency: 3 times per week  PT Discharge Recommendations: Moderate intensity level of continued care  Equipment Recommended upon Discharge: Wheeled walker  PT Recommended Transfer Status: Assist x1  PT - OK to Discharge: Yes      General Visit Information:   PT  Visit  PT Received On: 10/04/24  Response to Previous Treatment: Patient reporting fatigue but able to participate.  General  Prior to Session Communication: Bedside nurse  Patient Position Received: Bed, 3 rail up, Alarm off, not on at start of session  Preferred Learning Style: verbal, visual, written  General Comment: The pt was pleasant, cooperative and  willing to participate in therapy.    Subjective   Precautions:  Precautions  Hearing/Visual Limitations: Hearing and vision WFL  Medical Precautions: Fall precautions, Oxygen therapy device and L/min  Precautions Comment: Pt in compliance with precautions throughout PT session.    Vital Signs (Past 2hrs)        Date/Time Vitals Session Patient Position Pulse Resp SpO2 BP MAP (mmHg)    10/04/24 1148 --  --  85  16  97 %  116/77  90                   Vital Signs Comment: vitals stable     Objective   Pain:  Pain Assessment  Pain Assessment: 0-10  0-10 (Numeric) Pain Score: 8  Pain Type: Acute pain  Pain Location: Shoulder  Pain Orientation: Right  Pain Descriptors: Aching  Pain Frequency: Constant/continuous  Pain Onset: Ongoing  Clinical Progression: Not changed  Pain Interventions: Therapeutic presence  Response to Interventions: Pain stable  Multiple Pain Sites: Two  Pain 2  Pain Type 2: Acute pain  Pain Location 2: Leg  Pain Orientation 2: Left  Pain Descriptors 2: Aching  Pain Frequency 2: Constant/continuous  Pain Onset 2: On-going  Clinical Progression 2: Not changed  Pain Interventions 2: Therapeutic presence  Response to Interventions 2: Pain stable  Cognition:  Cognition  Overall Cognitive Status: Within Functional Limits  Orientation Level: Oriented X4  Following Commands: Follows all commands and directions without difficulty  Coordination:  Movements are Fluid and Coordinated: Yes  Coordination Comment: WFL  Postural Control:  Postural Control  Postural Control: Within Functional Limits  Posture Comment: The pt presented with good sitting and standing posture using a wheeled walker.  Static Sitting Balance  Static Sitting-Balance Support: Bilateral upper extremity supported, Feet supported  Static Sitting-Level of Assistance: Distant supervision  Static Sitting-Comment/Number of Minutes: Sitting EOB  Dynamic Sitting Balance  Dynamic Sitting-Balance Support: Bilateral upper extremity supported, Feet  supported  Dynamic Sitting-Level of Assistance: Distant supervision  Dynamic Sitting-Comments: Sitting EOB  Static Standing Balance  Static Standing-Balance Support: Bilateral upper extremity supported  Static Standing-Level of Assistance: Close supervision  Static Standing-Comment/Number of Minutes: using a wheeled walker  Dynamic Standing Balance  Dynamic Standing-Balance Support: Bilateral upper extremity supported  Dynamic Standing-Level of Assistance: Close supervision  Dynamic Standing-Comments: using a wheeled walker  Extremity/Trunk Assessments:     RUE   RUE : Within Functional Limits  LUE   LUE: Within Functional Limits  RLE   RLE : Exceptions to WFL  AROM RLE (degrees)  RLE AROM Comment: WFL  Strength RLE  R Hip Flexion: 4/5  R Knee Flexion: 4+/5  R Knee Extension: 4+/5  R Ankle Dorsiflexion: 5/5  R Ankle Plantar Flexion: 5/5  LLE   LLE : Exceptions to WFL  AROM LLE (degrees)  LLE AROM Comment: WFL  Strength LLE  L Hip Flexion: 3+/5  L Knee Flexion: 4/5  L Knee Extension: 4/5  L Ankle Dorsiflexion: 4+/5  L Ankle Plantar Flexion: 4+/5  Activity Tolerance:  Activity Tolerance  Endurance: Decreased tolerance for upright activites  Treatments:  Therapeutic Exercise  Therapeutic Exercise Performed: No    Therapeutic Activity  Therapeutic Activity Performed: Yes  Therapeutic Activity 1: The pt performed OOB activity using a wheeled walker.    Balance/Neuromuscular Re-Education  Balance/Neuromuscular Re-Education Activity Performed: Yes  Balance/Neuromuscular Re-Education Activity 1: Supervision assistance static sitting balance using Raul UE and LE support.  Balance/Neuromuscular Re-Education Activity 2: Supervision assistance dynamic sitting balance using Raul UE and LE support.  Balance/Neuromuscular Re-Education Activity 3: SBA static standing balance using a wheeled walker.  Balance/Neuromuscular Re-Education Activity 4: SBA dynamic standing balance using a wheeled walker.    Bed Mobility  Bed Mobility:  Yes  Bed Mobility 1  Bed Mobility 1: Supine to sitting  Level of Assistance 1: Close supervision  Bed Mobility Comments 1: lateral roll technique  Bed Mobility 2  Bed Mobility  2: Sitting to supine  Level of Assistance 2: Minimum assistance  Bed Mobility Comments 2: log roll technique    Ambulation/Gait Training  Ambulation/Gait Training Performed: No  Transfers  Transfer: Yes  Transfer 1  Transfer From 1: Sit to  Transfer to 1: Stand  Transfer Device 1: Walker  Transfer Level of Assistance 1: Moderate assistance  Transfers 2  Transfer From 2: Stand to  Transfer to 2: Sit  Transfer Device 2: Walker  Transfer Level of Assistance 2: Minimum assistance    Stairs  Stairs: No    Outcome Measures:  [facility] Basic Mobility  Turning from your back to your side while in a flat bed without using bedrails: A little  Moving from lying on your back to sitting on the side of a flat bed without using bedrails: A little  Moving to and from bed to chair (including a wheelchair): Total  Standing up from a chair using your arms (e.g. wheelchair or bedside chair): A lot  To walk in hospital room: Total  Climbing 3-5 steps with railing: Total  Basic Mobility - Total Score: 11    OP EDUCATION:  Outpatient Education  Individual(s) Educated: Patient  Education Provided: Body Mechanics, Fall Risk, Home Safety, Posture  Patient Response to Education: Patient/Caregiver Verbalized Understanding of Information, Patient/Caregiver Performed Return Demonstration of Exercises/Activities    Encounter Problems       Encounter Problems (Active)       PT Problem       Patient will complete supine to sit and sit to supine Independent  (Progressing)       Start:  09/17/24    Expected End:  10/18/24            Patient will perform sit<>stand transfer with LRAD, and Modified Independent  (Progressing)       Start:  09/17/24    Expected End:  10/18/24            Patient will ambulate >150' with LRAD and Modified Independent  (Progressing)       Start:   09/17/24    Expected End:  10/18/24            Patient will complete Tinetti Balance Assesment with a score equal to or better than 18 to reduce falls risk.    (Progressing)       Start:  09/17/24    Expected End:  10/18/24               Pain - Adult

## 2024-10-04 NOTE — PROGRESS NOTES
Social Work Note    Referral Source: SW-Erich Whaley MD   Meeting Location: In-Person  Person(s) Present: Pt & SW  Identified Needs: Social Support  Impression and Plan: LISW met with patient at bedside for ongoing assessment and support. Pt shared her experience with her current admission. Pt discussed the challenges of the admission and the gratitude she has for her mother. LISW provided pt with empathetic listening and support. Patient denies any further psychosocial or support service needs at this time. Patient encouraged to contact LISW if any needs arise.   Interventions Provided: Supportive Counseling  Estimated Time Spent: 35 min.     SW will continue to follow as needed.

## 2024-10-04 NOTE — ASSESSMENT & PLAN NOTE
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 Patient was subsequently intubated due to worsening lethargic and lack of respiratory compensation from significant metabolic acidosis with concern for ACS. Vancomycin and Zosyn started for empiric coverage, patient also started on pressors for BP support. Patient was transfused 2 units pRBCs and then underwent RBCEx on 9/11. Cardiology consulted for c/f NSTEMI vs demand ischemia (ST depressions and elevated troponin), rec treat as ACS. Course further c/b severe CHARLES with peak sCr of 5.4, and acute liver injury with severely elevated liver enzymes (ALT 4119, AST >10k). Liver US only remarkable for fatty infiltration, viral hepatitis panel negative. CHARLES and liver injury improved with supportive management. Pruritic rash noted 9/14, Derm consulted and took multiple biopsies 9/15. Rash then began to worsen to involve the groin, lateral thigh, and scalp with numerous tense bullae. Discontinued heparin given concerns for potential HIT, started bivalirudin. Patient was extubated on 9/16/2024. Started stress dose steroids given continued pressor requirements, pressors Dc'd 9/17. Patient transferred to [facility] 9/17, PT/OT rec SNF. Coumadin bridge was started 9/18. Given persistent rash with unremarkable labs, derm re-biopsied on 9/18, findings ultimately felt to be most c/w fixed drug eruption. Dermatology contacted for bx results (9/20) rec triamcinolone cream BID and interdry cloths. Leukocytosis uptrending 9/21, repeat infectious workup negative (CXR, Abd XR, blood cultures, MRSA negative 9/21).  UA with leuks, urine culture pending (9/21). On 9/25, per nursing and patient, rash seems to be worsening. Derm re-engaged recommended to apply vasoline to all eroded/denuded areas and continue triamcinolone cream to both the black plaques as well as the red areas across the trunk and thighs. 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 Wound care following. For warfarin dosing, per pharmacy, if INR between 2-3 can restart home warfarin 5mg daily, if INR < 2 restart bival. INR 3.4 on 9/25, warfarin stopped, repeat INR 2.8 9/26 and warfarin restarted. On 9/26, increased PO oxycodone and discontinued IV dilaudid in anticipation for discharge. Overnight on 9/29, brain attack called out of concern for change in mental status, no focal deficits noted, BAT cancelled and narcan administered x3 with improvement of mental status. Mentation improving as of 10/2, small dose oxy resumed. INR remains supratherapeutic 10/3 at 6.7, 5mg IV Vit K x1 given (ok per pharmacy) I/s/o ?bleeding from vagina vs wound excoriation and UA with +blood.  DC to SNF pending improvement in pain, skin, INR, and RBCEx.    # Waxing and waning AMS-- improving  - Felt to be due to opioids i/s/o worsening renal function and c/b respiratory acidosis  - All opioids on hold (9/28-10/2 resumed small dose oxy)  - Keep supplemental O2 on board, but titrate to maintain SPO2 of 90-92% at most  - On CPAP at home, enforce nightly use to extent possible; may need BiPAP if unresolved    # CHARLES on CKD II, improving  - Baseline ~ SCr <2, peak 5.4 this admission. 9/21 sCr 1.58 --> 1.13 (9/25)--> 1.14 (10/3)  - Likely pre-renal vs ATN given persistant hypotension over admission, on CKD II, now polyuric phase  - FeNA 0.4%  - Severe hyperkalemia with previous peaked T waves on EKG, resolved  - Nephrology following  - s/p multiple K cocktails in MICU  - Encourage increased PO  intake     # Purpuric rash, suspected fixed drug eruption  # Concern for possible heparin-induced thrombocytopenia (HIT) -resolved  - Purple, reticular pattern of plaques noted around the bilateral breasts, scalp and groin,  with tense bullae. Non-erythematous, no purulence  - Some initial concern for possible heparin-induced thrombocytopenia with concurrent skin findings. Discontinued heparin 9/16 and transitioned to bivalirudin  - 4 T score 0, PF4 w/ reflex BINA negative. Hematology ultimately consulted, felt there was low concern for HIT and signed off  - Differential diagnosis includes purpura secondary to infection versus coagulopathy versus vasculopathy versus small and/or medium vessel vasculitis versus calciphylaxis vs drug rash  - Dermatology consulted, work-up to date:  - Low Protein C activity  - ANCA, PR3 and MPO negative  - ISABELLE neg  - cryoglobulin labs- not enough specimen to analyze  - S/p skin punch biopsies x 2 9/15 and one on 9/18, showed SUPERFICIAL EPIDERMAL DEGENERATION WITH ERYTHROCYTE EXTRAVASATION WITH NEUTROPHILS. These findings could be seen secondary to an older trauma, or resolving erythema multiforme, a fixed drug eruption, Edgar-Christopher syndrome, or toxic epidermal necrolysis. Favor Multifocal Fixed Drug Eruption    - Dermatology contacted (9/20) about results, recommended adding vancomycin to allergy list and starting triamcinolone  - Continue triamcinolone 0.1% cream BID (9/20-current) per derm recs  - On 9/25, concern from pt and nursing that rash is not improving. Under breasts and groin still causing pain. Bilateral upper thighs and forehead rash improving   - Wound care re-consulted 9/25   - Re-engaged dermatology on 9/25 given worsening rash; recommended to apply vasoline to all eroded/denuded areas and continue triamcinolone cream to both the black plaques as well as the red areas across the trunk and thighs. Per dermatology, erythema is mild and not palpable, it is most likely  part of the drug eruption the patient has previously been known to have.     # Leukocytosis , improving  - Likely I/s/o rash vs reactive vs infectious process  - Leukocytosis noted on admission to MICU (WBC 24.8) --> 12.7 (9/25) --> 8.3 (10/3)  - Leukocytosis improved throughout hospital stay however started uptrending 9/20; improving as of 9/25   - Blood cultures 9/10, 9/12, 9/15 all NGTD  - Strep and Legionella Ag, MRSA nares negative  - CXR (9/21) largely unremarkable, again re-demonstrated perihilar prominence seen on prior XR  - Abdominal xray (9/21) unremarkable  - Repeat blood cultures x 2 (9/21) NGTD  - UA (9/21) with 500 LE, urine cx pending  - UA (9/30) +bacteria, +leuks, urine culture (9/29) mult organisms  - UA sent 10/3 with +blood, leuks, and WBC-Cx pending.       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  # ACS  - Previously managed through routine RBC exchanges q6 weeks, most recent RBCEx 3/1/24, per patient pausing on transfusions for time being  - OARRS reviewed, no aberrant behavior noted  - LDH>12,000 (now downtrending), haptoglobin <30, fibrinogen 318, retic % 5.2 on presentation, bili 10.7  - Leukocytosis 24.8 on admission to MICU  - CXR (9/10) persistent atelectasis/scarring in the left lower lung  - CT CAP (9/11) Diffuse mosaic attenuation of the lung parenchyma, which can be seen in the setting of small airway disease, pulmonary edema or acute infection  - Intubated upon arrival to MICU, now s/p extubation 9/16  - Started on vanc and zosyn (finished both courses prior to floor transfer)  - Procal elevated 7.09 (9/11)  - Blood cultures 9/10, 9/12, 9/15 all NGTD  - Strep and Legionella Ag, MRSA nares negative  - Trop peak 1431, cards rec treat as ACS (see below)  - S/p 2u pRBCs 9/10 on arrival to MICU  - S/p exchange transfusion 9/11 AM for ACS  - Care plan from 12/6/23- For mild to moderate pain: Dilaudid 0.5mg IVP q3h as needed, for moderate to severe pain Dilaudid 1- 1.5mg q3h PRN  - On arrival to [facility],  started on 0.6mg dilaudid IVP q3h PRN severe pain   - s/p 0.6mg dilaudid IVP q4h PRN breakthrough and 15mg PO oxycodone Q4 hrs severe pain (9/25)  - Per discussion with Dr. Whaley (9/25), okay to increase PO oxycodone to 15-20mg Q4hr to optimize pain for discharge to SNF.   - On 9/26, discontinued IV dilaudid and increased to 20mg PO oxycodone q4hrs for severe pain   - Opioids on hold as of 9/28 d/t AMS- resumed 2.5mg oxy q4hrs PRN severe pain on 10/2. ER oxy 10mg BID still held  - Bowel regimen for opioid induced constipation, zofran for opioid induced nausea, and benadrly for opioid induced pruritus  - c/w home Duloxetine 40mg daily, PO Zofran PRN, Folic Acid 1mg daily, and MVI daily     # ST depression with tropinemia, suspected 2/2 demand ischemia iso ACS  # Hx SVT  - Elevated troponins on admission, peak 1431 and now downtrending   - EKG with ST depressions, likely Type II MI due to vaso-occlusive crisis and anemia  - TTE 9/11: EF 60-65%, RV enlarged and reduced in function which appears stable when compared to last TTE (1/2024)  - Troponin leak is likely due to cardiac demand vs supply mismatch in the setting of worsening anemia and vaso-occlusive crisis  - Cardiology consulted, now signed off with indication to treat troponemia as ACS  - Lasix held in MICU 2/2 hypotension and CHARLES, can resume as able   - Follow-up with Cardiology outpt, FUV 2/13/25     # Acute liver injury, improving  # Direct hyperbilirubinuria, improving  - Suspected 2/2 hemodynamic instability versus sickle cell crisis  - On presentation to  MICU ALT 2611, AST 4727, T bili 10.2, INR 3.0; continues to improve  - CT with hepatic venous congestion, patient lacks gallbladder  - Liver US with Doppler: Diffuse fatty infiltration, unremarkable doppler interrogation of the liver  - EBV IgG positive, IgM negative  - CMV IgG positive, PCR negative  - Acute hepatitis panel unremarkable  - Hepatology initially following, now signed off  - Will continue  to monitor, trend daily CMP     # pHTN   - Initial c/f CTEPH as imaging findings with dilated PA, filling defects, and mosaicism  - VQ scan (6/13/23) with non anatomical basal defects and RUL defect due to scar--> not favoring CTEPH per Dr. Yi and Dr. 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 On lifelong anticoagulation, DOAC discouraged due to high risk of clotting   - home Coumadin 5mg daily initially HELD on admission to MICU 2/2 unstable course  - restarted coumadin 9/18 PM, will plan to bridge with bival, Ok'd by heme   - 9/20 INR 3.8, bival and home warfarin held. Pharmacy rec repeat INR 4 hours after bival was held. If the INR < 2, we can restart the bival, but if the INR 2-3, we can continue with warfarin monotherapy   - 9/20 repeat INR 3.7, pharmacy rec continuing to hold both medications  - 9/21 INR 3.2, per pharmacy - get repeat INR in AM 9/22, if INR between 2-3 can restart home warfarin 5mg daily  -INR 2.6 (9/24) after restarting Warfarin 5mg, will maintain INR goal of 2-3  - INR 3.4 (9/25). Held warfarin on 9/25. Repeat INR 9/26. Plan to restart if <3   - INR 2.8 (9/26), restarted warfarin, recheck INR 9/27   - INR 4.0 (9/30), held warfarin, repeat INR 10/1 ordered, plan to restart if <3  - INR 6.8 (10/1), patient without any active bleeding, per attending and pharmacy, warfarin held   - INR 6.8 (10/2),  patient without any active bleeding, per attending and pharmacy, warfarin held   - INR 6.7 (10/3), cont to hold warfarin.   - INR 1.5 (10/4) - continue to hold warfarin  Bridging would depend in the light of the RB exchange day - so far tentatively on Tuesday.   - Follows with Coumadin clinic outpatient    # CAN  - cont home CPAP   - cont home Albuterol PRN     # H/O Cauda Equine syndrome  - Follows Dr. Delacruz as outpatient  - no current issues      # DISPO  - Full code- confirmed on admission  - NOK: Tati Salgado (mother) (#[phone])  - PT/OT rec SNF, plan for rehab at [facility].   - FUV Anim 10/7, Cardiology 2/13

## 2024-10-04 NOTE — PROGRESS NOTES
09/24/24 1300   Discharge Planning   Who is requesting discharge planning? Patient   Home or Post Acute Services Post acute facilities (Rehab/SNF/etc)   Type of Post Acute Facility Services Skilled nursing   Expected Discharge Disposition SNF     Care Transitions Note  09/24/24  Met with Senait and  Tati ([phone]) at bedside to discuss discharge planning. [facility] is able to accept as long as patient is off IV pain meds. Team aware, plan was to wean off IV dilaudid tomorrow per rounds this morning. Will send updated clinicals from today.    Senait and Tati voiced concerns about Senait's rash. Tati would like the rash to improve before going to SNF. Tati states Dr. Whaley plans to check in with them. This LSW sent message to TEVIN Sahni outpatient SW to update her. Tati had questions about SNF providing injections or oral pain meds, LSW sent message to [facility] for more info. Mother also adds that Senait needs to be admitted for exchanges every 3-4 weeks, wonders how this would be handled if still at SNF. LSW will update team and follow for needs that arise.   YULI Zamorano     UPDATE 09/27/24  Per medical team, Senait's rash is stable and improving, although not totally better. They discussed with Senait and Tati that it will take time to heal. She has weaned off IV pain meds, now on oxy. Plan to go to SNF on Monday. LSW to start pre-cert for [facility].     UPDATE 09/30/24  Per team, patient is not medically ready. Pre-cert was not started for [facility], updates sent to facility. Following for discharge needs as patient improves.  YULI Zamorano     UPDATE 10/01/24  Patient had AMS, CHARLES, over the last few days which are beginning to improve per rounds this morning. Pain meds currently on hold. Updates sent to [facility]. ADOD may be later in the week pending further improvement. Will follow.   YULI Zamorano     UPDATE 10/03/24  Patient AMS and CHARLES are improving, but  team wants to see more improvement before discharge. Patient also needs routine exchange before leaving, which will be next week. DC pending improvement in INR and MS. [facility]   YULI Zamorano     UPDATE 10/04/24  Per team, mentation and INR are improving. Exchange planned for close to discharge, likely Monday. PT/OT needed for pre-cert. LSW to ask weekend team to begin pre-cert over weekend for tentative DC Monday. Updates sent to facility.   YULI Zamorano

## 2024-10-04 NOTE — PROGRESS NOTES
"Senait Narvaez is a 54 y.o. female on day 24 of admission presenting with Sickle cell disease with crisis and other complication.    Subjective   Patient is seen and examined this AM.   Denies major active complaints.   Purpura-not palpable.   Reports minimal vaginal bleeding.   Reports acceptable appetite.   Improvement noted in mentation.   RBC exch transfusion -planned for Tuesday 10/8/24 to be confirmed whether transfusion medicine can consider Monday 10/7/24.   Objective   Physical Exam  Vitals:    10/04/24 0441   BP: 115/69   Pulse: 84   Resp: 16   Temp: 36.1 °C (97 °F)   SpO2: 95%    Constitutional:       She is Obese.  HENT:      Mouth/Throat:      Mouth: Mucous membranes are moist.   Eyes:      Pupils: Pupils are equal, round, and reactive to light.   Cardiovascular:      Rate and Rhythm: Normal rate and regular rhythm.      Pulses: Normal pulses.      Heart sounds: Normal heart sounds.   Pulmonary:      Effort: Pulmonary effort is normal.      Breath sounds: Normal breath sounds.      2L NC in place  Abdominal:      General: Abdomen is flat. Bowel sounds are normal.      Palpations: Abdomen is soft.   Musculoskeletal:         General: Normal range of motion.      Cervical back: Normal range of motion and neck supple.   Skin:     General: Skin is warm.   Neurological:      General: No focal deficit present.      Mental Status: She is alert.   Psychiatric:         Mood and Affect: Mood normal.      Last Recorded Vitals  Blood pressure 115/69, pulse 84, temperature 36.1 °C (97 °F), temperature source Temporal, resp. rate 16, height 1.651 m (5' 5\"), weight 120 kg (264 lb 12.4 oz), SpO2 95%.  Intake/Output last 3 Shifts:  No intake/output data recorded.    Relevant Results  Results from last 7 days   Lab Units 10/03/24  0739 10/02/24  0655 10/01/24  1127 10/01/24  1104 10/01/24  0844 09/30/24  0705 09/29/24  0848 09/29/24  0458 09/29/24  0246 09/29/24  0230   POCT GLUCOSE mg/dL  --   --  78 52*  --   --   --  88  " --  84   GLUCOSE mg/dL 72* 70*  --   --  53* 62* 73*  --    < >  --     < > = values in this interval not displayed.          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 Patient was subsequently intubated due to worsening lethargic and lack of respiratory compensation from significant metabolic acidosis with concern for ACS. Vancomycin and Zosyn started for empiric coverage, patient also started on pressors for BP support. Patient was transfused 2 units pRBCs and then underwent RBCEx on 9/11. Cardiology consulted for c/f NSTEMI vs demand ischemia (ST depressions and elevated troponin), rec treat as ACS. Course further c/b severe CHARLES with peak sCr of 5.4, and acute liver injury with severely elevated liver enzymes (ALT 4119, AST >10k). Liver US only remarkable for fatty infiltration, viral hepatitis panel negative. CHARLES and liver injury improved with supportive management. Pruritic rash noted 9/14, Derm consulted and took multiple biopsies 9/15. Rash then began to worsen to involve the groin, lateral thigh, and scalp with numerous tense bullae. Discontinued heparin given concerns for potential HIT, started bivalirudin. Patient was extubated on 9/16/2024. Started stress dose steroids given continued pressor requirements, pressors Dc'd 9/17. Patient transferred to [facility] 9/17, PT/OT rec SNF. Coumadin bridge was started 9/18. Given persistent rash with unremarkable labs, derm re-biopsied on 9/18, findings ultimately felt to be most c/w fixed drug  eruption. Dermatology contacted for bx results (9/20) rec triamcinolone cream BID and interdry cloths. Leukocytosis uptrending 9/21, repeat infectious workup negative (CXR, Abd XR, blood cultures, MRSA negative 9/21). UA with leuks, urine culture pending (9/21). On 9/25, per nursing and patient, rash seems to be worsening. Derm re-engaged recommended to apply vasoline to all eroded/denuded areas and continue triamcinolone cream to both the black plaques as well as the red areas across the trunk and thighs. 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 Wound care following. For warfarin dosing, per pharmacy, if INR between 2-3 can restart home warfarin 5mg daily, if INR < 2 restart bival. INR 3.4 on 9/25, warfarin stopped, repeat INR 2.8 9/26 and warfarin restarted. On 9/26, increased PO oxycodone and discontinued IV dilaudid in anticipation for discharge. Overnight on 9/29, brain attack called out of concern for change in mental status, no focal deficits noted, BAT cancelled and narcan administered x3 with improvement of mental status. Mentation improving as of 10/2, small dose oxy resumed. INR remains supratherapeutic 10/3 at 6.7, 5mg IV Vit K x1 given (ok per pharmacy) I/s/o ?bleeding from vagina vs wound excoriation and UA with +blood.  DC to SNF pending improvement in pain, skin, INR, and RBCEx.    # Waxing and waning AMS-- improving  - Felt to be due to opioids i/s/o worsening renal function and c/b respiratory acidosis  - All opioids on hold (9/28-10/2 resumed small dose oxy)  - Keep supplemental O2 on board, but titrate to maintain SPO2 of 90-92% at most  - On CPAP at home, enforce nightly use to extent possible; may need BiPAP if unresolved    # CHARLES on CKD II, improving  - Baseline ~ SCr <2, peak 5.4 this admission. 9/21 sCr 1.58 --> 1.13 (9/25)--> 1.14 (10/3)  - Likely pre-renal vs ATN given persistant  hypotension over admission, on CKD II, now polyuric phase  - FeNA 0.4%  - Severe hyperkalemia with previous peaked T waves on EKG, resolved  - Nephrology following  - s/p multiple K cocktails in MICU  - Encourage increased PO intake     # Purpuric rash, suspected fixed drug eruption  # Concern for possible heparin-induced thrombocytopenia (HIT) -resolved  - Purple, reticular pattern of plaques noted around the bilateral breasts, scalp and groin,  with tense bullae. Non-erythematous, no purulence  - Some initial concern for possible heparin-induced thrombocytopenia with concurrent skin findings. Discontinued heparin 9/16 and transitioned to bivalirudin  - 4 T score 0, PF4 w/ reflex BINA negative. Hematology ultimately consulted, felt there was low concern for HIT and signed off  - Differential diagnosis includes purpura secondary to infection versus coagulopathy versus vasculopathy versus small and/or medium vessel vasculitis versus calciphylaxis vs drug rash  - Dermatology consulted, work-up to date:  - Low Protein C activity  - ANCA, PR3 and MPO negative  - ISABELLE neg  - cryoglobulin labs- not enough specimen to analyze  - S/p skin punch biopsies x 2 9/15 and one on 9/18, showed SUPERFICIAL EPIDERMAL DEGENERATION WITH ERYTHROCYTE EXTRAVASATION WITH NEUTROPHILS. These findings could be seen secondary to an older trauma, or resolving erythema multiforme, a fixed drug eruption, Edgar-Christopher syndrome, or toxic epidermal necrolysis. Favor Multifocal Fixed Drug Eruption    - Dermatology contacted (9/20) about results, recommended adding vancomycin to allergy list and starting triamcinolone  - Continue triamcinolone 0.1% cream BID (9/20-current) per derm recs  - On 9/25, concern from pt and nursing that rash is not improving. Under breasts and groin still causing pain. Bilateral upper thighs and forehead rash improving   - Wound care re-consulted 9/25   - Re-engaged dermatology on 9/25 given worsening rash; recommended to  apply vasoline to all eroded/denuded areas and continue triamcinolone cream to both the black plaques as well as the red areas across the trunk and thighs. 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     # Leukocytosis , improving  - Likely I/s/o rash vs reactive vs infectious process  - Leukocytosis noted on admission to MICU (WBC 24.8) --> 12.7 (9/25) --> 8.3 (10/3)  - Leukocytosis improved throughout hospital stay however started uptrending 9/20; improving as of 9/25   - Blood cultures 9/10, 9/12, 9/15 all NGTD  - Strep and Legionella Ag, MRSA nares negative  - CXR (9/21) largely unremarkable, again re-demonstrated perihilar prominence seen on prior XR  - Abdominal xray (9/21) unremarkable  - Repeat blood cultures x 2 (9/21) NGTD  - UA (9/21) with 500 LE, urine cx pending  - UA (9/30) +bacteria, +leuks, urine culture (9/29) mult organisms  - UA sent 10/3 with +blood, leuks, and WBC-Cx pending.       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  # ACS  - Previously managed through routine RBC exchanges q6 weeks, most recent RBCEx 3/1/24, per patient pausing on transfusions for time being  - OARRS reviewed, no aberrant behavior noted  - LDH>12,000 (now downtrending), haptoglobin <30, fibrinogen 318, retic % 5.2 on presentation, bili 10.7  - Leukocytosis 24.8 on admission to MICU  - CXR (9/10) persistent atelectasis/scarring in the left lower lung  - CT CAP (9/11) Diffuse mosaic attenuation of the lung parenchyma, which can be seen in the setting of small airway disease, pulmonary edema or acute infection  - Intubated upon arrival to MICU, now s/p extubation 9/16  - Started on vanc and zosyn (finished both courses prior to floor transfer)  - Procal elevated 7.09 (9/11)  - Blood cultures 9/10, 9/12, 9/15 all NGTD  - Strep and Legionella Ag, MRSA nares negative  - Trop peak 1431, cards rec treat as ACS (see below)  - S/p 2u pRBCs 9/10  on arrival to MICU  - S/p exchange transfusion 9/11 AM for ACS  - Care plan from 12/6/23- For mild to moderate pain: Dilaudid 0.5mg IVP q3h as needed, for moderate to severe pain Dilaudid 1- 1.5mg q3h PRN  - On arrival to [facility], started on 0.6mg dilaudid IVP q3h PRN severe pain   - s/p 0.6mg dilaudid IVP q4h PRN breakthrough and 15mg PO oxycodone Q4 hrs severe pain (9/25)  - Per discussion with Dr. Whaley (9/25), okay to increase PO oxycodone to 15-20mg Q4hr to optimize pain for discharge to SNF.   - On 9/26, discontinued IV dilaudid and increased to 20mg PO oxycodone q4hrs for severe pain   - Opioids on hold as of 9/28 d/t AMS- resumed 2.5mg oxy q4hrs PRN severe pain on 10/2. ER oxy 10mg BID still held  - Bowel regimen for opioid induced constipation, zofran for opioid induced nausea, and benadrly for opioid induced pruritus  - c/w home Duloxetine 40mg daily, PO Zofran PRN, Folic Acid 1mg daily, and MVI daily     # ST depression with tropinemia, suspected 2/2 demand ischemia iso ACS  # Hx SVT  - Elevated troponins on admission, peak 1431 and now downtrending   - EKG with ST depressions, likely Type II MI due to vaso-occlusive crisis and anemia  - TTE 9/11: EF 60-65%, RV enlarged and reduced in function which appears stable when compared to last TTE (1/2024)  - Troponin leak is likely due to cardiac demand vs supply mismatch in the setting of worsening anemia and vaso-occlusive crisis  - Cardiology consulted, now signed off with indication to treat troponemia as ACS  - Lasix held in MICU 2/2 hypotension and CHARLES, can resume as able   - Follow-up with Cardiology outpt, FUV 2/13/25     # Acute liver injury, improving  # Direct hyperbilirubinuria, improving  - Suspected 2/2 hemodynamic instability versus sickle cell crisis  - [facility], T bili 10.2, INR 3.0; continues to improve  - CT with hepatic venous congestion, patient lacks gallbladder  - Liver US with Doppler: Diffuse fatty  infiltration, unremarkable doppler interrogation of the liver  - EBV IgG positive, IgM negative  - CMV IgG positive, PCR negative  - Acute hepatitis panel unremarkable  - Hepatology initially following, now signed off  - Will continue to monitor, trend daily CMP     # pHTN   - Initial c/f CTEPH as imaging findings with dilated PA, filling defects, and mosaicism  - VQ scan (6/13/23) with non anatomical basal defects and RUL defect due to scar--> not favoring CTEPH per Dr. Yi and Dr. 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 On lifelong anticoagulation, DOAC discouraged due to high risk of clotting   - home Coumadin 5mg daily initially HELD on admission to MICU 2/2 unstable course  - restarted coumadin 9/18 PM, will plan to bridge with bival, Ok'd by heme   - 9/20 INR 3.8, bival and home warfarin held. Pharmacy rec repeat INR 4 hours after bival was held. If the INR < 2, we can restart the bival, but if the INR 2-3, we can continue with warfarin monotherapy   - 9/20 repeat INR 3.7, pharmacy rec continuing to hold both medications  - 9/21 INR 3.2, per pharmacy - get repeat INR in AM 9/22, if INR between 2-3 can restart home warfarin 5mg daily  -INR 2.6 (9/24) after restarting Warfarin 5mg, will maintain INR goal of 2-3  - INR 3.4 (9/25). Held warfarin on 9/25. Repeat INR 9/26. Plan to restart if <3   - INR 2.8 (9/26),  restarted warfarin, recheck INR 9/27   - INR 4.0 (9/30), held warfarin, repeat INR 10/1 ordered, plan to restart if <3  - INR 6.8 (10/1), patient without any active bleeding, per attending and pharmacy, warfarin held   - INR 6.8 (10/2), patient without any active bleeding, per attending and pharmacy, warfarin held   - INR 6.7 (10/3), cont to hold warfarin.   - INR 1.5 (10/4) - continue to hold warfarin  Bridging would depend in the light of the RB exchange day - so far tentatively on Tuesday.   - Follows with Coumadin clinic outpatient    # CAN  - cont home CPAP   - cont home Albuterol PRN     # H/O Cauda Equine syndrome  - Follows Dr. Delacruz as outpatient  - no current issues      # DISPO  - Full code- confirmed on admission  - NOK: Tati Salgado (mother) (#[phone])  - PT/OT rec SNF, plan for rehab at [facility].   - FUV Anim 10/7, Cardiology 2/13    Oriana Kong MD

## 2024-10-05 ENCOUNTER — APPOINTMENT (OUTPATIENT)
Dept: RADIOLOGY | Facility: HOSPITAL | Age: 54
DRG: 811 | End: 2024-10-05
Payer: COMMERCIAL

## 2024-10-05 LAB
ALBUMIN SERPL BCP-MCNC: 2.5 G/DL (ref 3.4–5)
ALP SERPL-CCNC: 130 U/L (ref 33–110)
ALT SERPL W P-5'-P-CCNC: 19 U/L (ref 7–45)
ANION GAP SERPL CALC-SCNC: 12 MMOL/L (ref 10–20)
APTT PPP: 27 SECONDS (ref 27–38)
AST SERPL W P-5'-P-CCNC: 29 U/L (ref 9–39)
BASOPHILS # BLD AUTO: 0.03 X10*3/UL (ref 0–0.1)
BASOPHILS NFR BLD AUTO: 0.4 %
BILIRUB SERPL-MCNC: 2.5 MG/DL (ref 0–1.2)
BUN SERPL-MCNC: 9 MG/DL (ref 6–23)
CALCIUM SERPL-MCNC: 8.7 MG/DL (ref 8.6–10.6)
CHLORIDE SERPL-SCNC: 102 MMOL/L (ref 98–107)
CO2 SERPL-SCNC: 32 MMOL/L (ref 21–32)
CREAT SERPL-MCNC: 0.98 MG/DL (ref 0.5–1.05)
EGFRCR SERPLBLD CKD-EPI 2021: 68 ML/MIN/1.73M*2
EOSINOPHIL # BLD AUTO: 0.18 X10*3/UL (ref 0–0.7)
EOSINOPHIL NFR BLD AUTO: 2.1 %
ERYTHROCYTE [DISTWIDTH] IN BLOOD BY AUTOMATED COUNT: 18.7 % (ref 11.5–14.5)
GLUCOSE SERPL-MCNC: 72 MG/DL (ref 74–99)
HCT VFR BLD AUTO: 27.4 % (ref 36–46)
HGB BLD-MCNC: 9 G/DL (ref 12–16)
HGB RETIC QN: 30 PG (ref 28–38)
IMM GRANULOCYTES # BLD AUTO: 0.06 X10*3/UL (ref 0–0.7)
IMM GRANULOCYTES NFR BLD AUTO: 0.7 % (ref 0–0.9)
IMMATURE RETIC FRACTION: 18.7 %
INR PPP: 1.2 (ref 0.9–1.1)
LDH SERPL L TO P-CCNC: 254 U/L (ref 84–246)
LYMPHOCYTES # BLD AUTO: 1.11 X10*3/UL (ref 1.2–4.8)
LYMPHOCYTES NFR BLD AUTO: 13 %
MAGNESIUM SERPL-MCNC: 1.6 MG/DL (ref 1.6–2.4)
MCH RBC QN AUTO: 29.6 PG (ref 26–34)
MCHC RBC AUTO-ENTMCNC: 32.8 G/DL (ref 32–36)
MCV RBC AUTO: 90 FL (ref 80–100)
MONOCYTES # BLD AUTO: 1.09 X10*3/UL (ref 0.1–1)
MONOCYTES NFR BLD AUTO: 12.7 %
NEUTROPHILS # BLD AUTO: 6.08 X10*3/UL (ref 1.2–7.7)
NEUTROPHILS NFR BLD AUTO: 71.1 %
NRBC BLD-RTO: 24.9 /100 WBCS (ref 0–0)
PLATELET # BLD AUTO: 307 X10*3/UL (ref 150–450)
POTASSIUM SERPL-SCNC: 3.9 MMOL/L (ref 3.5–5.3)
PROT SERPL-MCNC: 5.8 G/DL (ref 6.4–8.2)
PROTHROMBIN TIME: 13.9 SECONDS (ref 9.8–12.8)
RBC # BLD AUTO: 3.04 X10*6/UL (ref 4–5.2)
RETICS #: 0.43 X10*6/UL (ref 0.02–0.08)
RETICS/RBC NFR AUTO: 14.2 % (ref 0.5–2)
SODIUM SERPL-SCNC: 142 MMOL/L (ref 136–145)
UFH PPP CHRO-ACNC: <0.1 IU/ML
WBC # BLD AUTO: 8.6 X10*3/UL (ref 4.4–11.3)

## 2024-10-05 PROCEDURE — 93971 EXTREMITY STUDY: CPT | Performed by: RADIOLOGY

## 2024-10-05 PROCEDURE — 2500000004 HC RX 250 GENERAL PHARMACY W/ HCPCS (ALT 636 FOR OP/ED): Performed by: NURSE PRACTITIONER

## 2024-10-05 PROCEDURE — 85730 THROMBOPLASTIN TIME PARTIAL: CPT

## 2024-10-05 PROCEDURE — 2500000004 HC RX 250 GENERAL PHARMACY W/ HCPCS (ALT 636 FOR OP/ED)

## 2024-10-05 PROCEDURE — 85025 COMPLETE CBC W/AUTO DIFF WBC: CPT | Performed by: NURSE PRACTITIONER

## 2024-10-05 PROCEDURE — 80053 COMPREHEN METABOLIC PANEL: CPT | Performed by: NURSE PRACTITIONER

## 2024-10-05 PROCEDURE — 85610 PROTHROMBIN TIME: CPT

## 2024-10-05 PROCEDURE — 85520 HEPARIN ASSAY: CPT | Performed by: NURSE PRACTITIONER

## 2024-10-05 PROCEDURE — 36415 COLL VENOUS BLD VENIPUNCTURE: CPT

## 2024-10-05 PROCEDURE — 36415 COLL VENOUS BLD VENIPUNCTURE: CPT | Performed by: NURSE PRACTITIONER

## 2024-10-05 PROCEDURE — 2500000001 HC RX 250 WO HCPCS SELF ADMINISTERED DRUGS (ALT 637 FOR MEDICARE OP)

## 2024-10-05 PROCEDURE — 83615 LACTATE (LD) (LDH) ENZYME: CPT

## 2024-10-05 PROCEDURE — 1170000001 HC PRIVATE ONCOLOGY ROOM DAILY

## 2024-10-05 PROCEDURE — 2500000005 HC RX 250 GENERAL PHARMACY W/O HCPCS: Performed by: NURSE PRACTITIONER

## 2024-10-05 PROCEDURE — 83735 ASSAY OF MAGNESIUM: CPT | Performed by: NURSE PRACTITIONER

## 2024-10-05 PROCEDURE — 2500000001 HC RX 250 WO HCPCS SELF ADMINISTERED DRUGS (ALT 637 FOR MEDICARE OP): Performed by: NURSE PRACTITIONER

## 2024-10-05 PROCEDURE — 93970 EXTREMITY STUDY: CPT

## 2024-10-05 PROCEDURE — 85045 AUTOMATED RETICULOCYTE COUNT: CPT

## 2024-10-05 PROCEDURE — 99233 SBSQ HOSP IP/OBS HIGH 50: CPT | Performed by: NURSE PRACTITIONER

## 2024-10-05 RX ORDER — OXYCODONE HYDROCHLORIDE 10 MG/1
10 TABLET ORAL
Status: DISCONTINUED | OUTPATIENT
Start: 2024-10-05 | End: 2024-10-21 | Stop reason: HOSPADM

## 2024-10-05 RX ORDER — HEPARIN SODIUM 10000 [USP'U]/100ML
0-4500 INJECTION, SOLUTION INTRAVENOUS CONTINUOUS
Status: DISCONTINUED | OUTPATIENT
Start: 2024-10-05 | End: 2024-10-07

## 2024-10-05 RX ORDER — HEPARIN SODIUM 10000 [USP'U]/100ML
0-4500 INJECTION, SOLUTION INTRAVENOUS CONTINUOUS
Status: DISCONTINUED | OUTPATIENT
Start: 2024-10-05 | End: 2024-10-05

## 2024-10-05 ASSESSMENT — PAIN - FUNCTIONAL ASSESSMENT
PAIN_FUNCTIONAL_ASSESSMENT: 0-10

## 2024-10-05 ASSESSMENT — PAIN SCALES - GENERAL
PAINLEVEL_OUTOF10: 8
PAINLEVEL_OUTOF10: 9
PAINLEVEL_OUTOF10: 9
PAINLEVEL_OUTOF10: 8
PAINLEVEL_OUTOF10: 9

## 2024-10-05 NOTE — PROGRESS NOTES
"Senait Narvaez is a 54 y.o. female on day 25 of admission presenting with Sickle cell disease with crisis and other complication.    Subjective   Seen this AM at the bedside. Mother also present at bedside. Pt reports that she is having significant pain in her LLE and swelling and she is concerned for a DVT. I discussed with her/mom that her INR has been supratherapeutic so the chance of DVT is unlikely but we can do a duplex of her legs to make sure. We also discussed increasing pain regimen now that she is more alert. Also discussed using her CPAP at night. She denies any fevers/chills, SOB, or CP.      Objective     Physical Exam  Constitutional:       Appearance: Normal appearance. She is obese.   HENT:      Mouth/Throat:      Mouth: Mucous membranes are moist.   Eyes:      Pupils: Pupils are equal, round, and reactive to light.   Cardiovascular:      Rate and Rhythm: Normal rate and regular rhythm.      Pulses: Normal pulses.      Heart sounds: Normal heart sounds.   Pulmonary:      Effort: Pulmonary effort is normal.      Breath sounds: Normal breath sounds.      Comments: 2L NC in place  Abdominal:      General: Abdomen is flat. Bowel sounds are normal.      Palpations: Abdomen is soft.   Musculoskeletal:         General: Normal range of motion.      Cervical back: Normal range of motion and neck supple.      Left lower leg: Edema present.   Skin:     General: Skin is warm.   Neurological:      General: No focal deficit present.      Mental Status: She is alert.   Psychiatric:         Mood and Affect: Mood normal.       Last Recorded Vitals  Blood pressure 109/72, pulse 74, temperature 37.3 °C (99.1 °F), temperature source Temporal, resp. rate 18, height 1.651 m (5' 5\"), weight 120 kg (264 lb 12.4 oz), SpO2 95%.  Intake/Output last 3 Shifts:  No intake/output data recorded.    No results found. However, due to the size of the patient record, not all encounters were searched. Please check Results Review for a " complete set of results.    Results for orders placed or performed during the hospital encounter of 09/10/24 (from the past 24 hour(s))   Reticulocytes   Result Value Ref Range    Retic % 14.2 (H) 0.5 - 2.0 %    Retic Absolute 0.430 (H) 0.018 - 0.083 x10*6/uL    Reticulocyte Hemoglobin 30 28 - 38 pg    Immature Retic fraction 18.7 (H) <=16.0 %   CBC and Auto Differential   Result Value Ref Range    WBC 8.6 4.4 - 11.3 x10*3/uL    nRBC 24.9 (H) 0.0 - 0.0 /100 WBCs    RBC 3.04 (L) 4.00 - 5.20 x10*6/uL    Hemoglobin 9.0 (L) 12.0 - 16.0 g/dL    Hematocrit 27.4 (L) 36.0 - 46.0 %    MCV 90 80 - 100 fL    MCH 29.6 26.0 - 34.0 pg    MCHC 32.8 32.0 - 36.0 g/dL    RDW 18.7 (H) 11.5 - 14.5 %    Platelets 307 150 - 450 x10*3/uL    Neutrophils % 71.1 40.0 - 80.0 %    Immature Granulocytes %, Automated 0.7 0.0 - 0.9 %    Lymphocytes % 13.0 13.0 - 44.0 %    Monocytes % 12.7 2.0 - 10.0 %    Eosinophils % 2.1 0.0 - 6.0 %    Basophils % 0.4 0.0 - 2.0 %    Neutrophils Absolute 6.08 1.20 - 7.70 x10*3/uL    Immature Granulocytes Absolute, Automated 0.06 0.00 - 0.70 x10*3/uL    Lymphocytes Absolute 1.11 (L) 1.20 - 4.80 x10*3/uL    Monocytes Absolute 1.09 (H) 0.10 - 1.00 x10*3/uL    Eosinophils Absolute 0.18 0.00 - 0.70 x10*3/uL    Basophils Absolute 0.03 0.00 - 0.10 x10*3/uL     *Note: Due to a large number of results and/or encounters for the requested time period, some results have not been displayed. A complete set of results can be found in Results Review.               Scheduled medications  folic acid, 1 mg, oral, Daily  [Held by provider] metoprolol tartrate, 12.5 mg, oral, BID  nystatin, 1 Application, Topical, BID  [Held by provider] oxyCODONE ER, 10 mg, oral, q12h BEATA  oxygen, , inhalation, Continuous - Inhalation  pantoprazole, 40 mg, intravenous, Daily  polyethylene glycol, 17 g, oral, BID  sennosides, 2 tablet, oral, BID  thiamine, 100 mg, intravenous, Daily  triamcinolone, , Topical, BID  [Held by provider] warfarin, 5 mg,  oral, Daily  white petrolatum, , Topical, BID      Continuous medications     PRN medications  PRN medications: acetaminophen, albuterol, alteplase, bisacodyl, dextrose, dextrose, glucagon, glucagon, naloxone, ondansetron, oxyCODONE, [Held by provider] oxyCODONE, oxygen, sodium chloride               Assessment/Plan   Assessment & Plan  Sickle cell disease with crisis and other complication  Senait Narvaez is a 54 year-old female with a PMHx of Hgb SC disease (previously on exchange transfusions q4-6 weeks, last transfusion 3/1/24), hx of SVC syndrome, 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 Patient was subsequently intubated due to worsening lethargic and lack of respiratory compensation from significant metabolic acidosis with concern for ACS. Vancomycin and Zosyn started for empiric coverage, patient also started on pressors for BP support. Patient was transfused 2 units pRBCs and then underwent RBCEx on 9/11. Cardiology consulted for c/f NSTEMI vs demand ischemia (ST depressions and elevated troponin), rec treat as ACS. Course further c/b severe CHARLES with peak sCr of 5.4, and acute liver injury with severely elevated liver enzymes (ALT 4119, AST >10k). Liver US only remarkable for fatty infiltration, viral hepatitis panel negative. CHARLES and liver injury improved with supportive management. Pruritic rash noted 9/14, Derm consulted and took multiple biopsies 9/15. Rash then began to worsen to involve the groin, lateral thigh, and scalp with numerous tense bullae. Discontinued heparin given concerns for potential HIT, started bivalirudin. Patient was extubated on 9/16/2024. Started stress dose steroids given continued pressor requirements, pressors Dc'd 9/17.     Patient transferred to  RNF 9/17, PT/OT rec SNF. Coumadin bridge was started 9/18. Given persistent rash with unremarkable labs, derm re-biopsied on 9/18, findings ultimately felt to be most c/w fixed drug eruption. Dermatology contacted for bx results (9/20) rec triamcinolone cream BID and interdry cloths. Leukocytosis uptrending 9/21, repeat infectious workup negative (CXR, Abd XR, blood cultures, MRSA negative 9/21). UA with leuks. On 9/25, per nursing and patient, rash seems to be worsening. Derm re-engaged recommended to apply vasoline to all eroded/denuded areas and continue triamcinolone cream to both the black plaques as well as the red areas across the trunk and thighs. 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 Wound care following. For warfarin dosing, per pharmacy, if INR between 2-3 can restart home warfarin 5mg daily, if INR < 2 restart bival. INR 3.4 on 9/25, warfarin stopped, repeat INR 2.8 9/26 and warfarin restarted. On 9/26, increased PO oxycodone and discontinued IV dilaudid in anticipation for discharge. Overnight on 9/29, brain attack called out of concern for change in mental status, no focal deficits noted, BAT cancelled and narcan administered x3 with improvement of mental status. Mentation improving as of 10/2, small dose oxy resumed. INR remains supratherapeutic 10/3 at 6.7, 5mg IV Vit K x1 given (ok per pharmacy) I/s/o ?bleeding from vagina vs wound excoriation and UA with +blood. INR  down to 1.5 (10/4). Plan for RBCEx prior to dc per Dr. Whaley, likely Tuesday 8/8. 10/5 ordered duplex BLE as pt c/o severe LLE pain and swelling. 10/5 started heparin drip given normal INR while pending apheresis line placement (previous HIT workup with negative PF4). DC to SNF pending improvement in pain, skin, INR, and RBCEx.      # Waxing and waning AMS-- improved  - Felt to be due to opioids i/s/o worsening renal function and c/b  respiratory acidosis  - All opioids on hold (9/28-10/2 resumed small dose oxy)  - Keep supplemental O2 on board, but titrate to maintain SPO2 of 90-92% at most  - On CPAP at home, enforce nightly use to extent possible; may need BiPAP if unresolved     # CHARLES on CKD II, improving  - Baseline ~ SCr <2, peak 5.4 this admission. 9/21 sCr 1.58 --> 1.13 (9/25)--> 1.14 (10/3)--> 1.02 (10/4)  - Likely pre-renal vs ATN given persistant hypotension over admission, on CKD II, now polyuric phase  - FeNA 0.4%  - Severe hyperkalemia with previous peaked T waves on EKG, resolved  - Nephrology following  - s/p multiple K cocktails in MICU  - Encourage increased PO intake     # Purpuric rash, suspected fixed drug eruption  # Concern for possible heparin-induced thrombocytopenia (HIT) -resolved  - Purple, reticular pattern of plaques noted around the bilateral breasts, scalp and groin,  with tense bullae. Non-erythematous, no purulence  - Some initial concern for possible heparin-induced thrombocytopenia with concurrent skin findings. Discontinued heparin 9/16 and transitioned to bivalirudin  - 4 T score 0, PF4 w/ reflex BINA negative. Hematology ultimately consulted, felt there was low concern for HIT and signed off  - Differential diagnosis includes purpura secondary to infection versus coagulopathy versus vasculopathy versus small and/or medium vessel vasculitis versus calciphylaxis vs drug rash  - Dermatology consulted, work-up to date:  - Low Protein C activity  - ANCA, PR3 and MPO negative  - ISABELLE neg  - cryoglobulin labs- not enough specimen to analyze  - S/p skin punch biopsies x 2 9/15 and one on 9/18, showed SUPERFICIAL EPIDERMAL DEGENERATION WITH ERYTHROCYTE EXTRAVASATION WITH NEUTROPHILS. These findings could be seen secondary to an older trauma, or resolving erythema multiforme, a fixed drug eruption, Edgar-Christopher syndrome, or toxic epidermal necrolysis. Favor Multifocal Fixed Drug Eruption    - Dermatology contacted  (9/20) about results, recommended adding vancomycin to allergy list and starting triamcinolone  - Continue triamcinolone 0.1% cream BID (9/20-current) per derm recs  - Plan for left breat suture removal around 9/25-9/27 (per derm, sutures from flank were dissolvable)  - On 9/25, concern from pt and nursing that rash is not improving. Under breasts and groin still causing pain. Bilateral upper thighs and forehead rash improving   - Wound care re-consulted 9/25, s/o 10/3 as derm gave recs below  - Re-engaged dermatology on 9/25 given worsening rash; recommended to apply vasoline to all eroded/denuded areas and continue triamcinolone cream to both the black plaques as well as the red areas across the trunk and thighs. Per dermatology, erythema is mild and not palpable, it is most likely part of the drug eruption the patient has previously been known to have     # Leukocytosis , improving  - Likely I/s/o rash vs reactive vs infectious process  - Leukocytosis noted on admission to MICU (WBC 24.8) --> 12.7 (9/25) --> 8.3 (10/3)  - Leukocytosis improved throughout hospital stay however started uptrending 9/20; now improving on 9/25   - Blood cultures 9/10, 9/12, 9/15 all NGTD  - Strep and Legionella Ag, MRSA nares negative  - CXR (9/21) largely unremarkable, again re-demonstrated perihilar prominence seen on prior XR  - Abdominal xray (9/21) unremarkable  - Repeat blood cultures x 2 (9/21) NGTD  - UA (9/21) with 500 LE  - UA (9/30) +bacteria, +leuks, urine culture (9/29) mult organisms  - UA sent 10/3 with +blood, leuks, and WBC- not reflex to cx so UCX added on to 10/3 UA and again showed multiple organisms  - Will hold off tx for UTI for now as pt asymptomatic     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  # ACS  - Previously managed through routine RBC exchanges q6 weeks, most recent RBCEx 3/1/24, per patient pausing on transfusions for time being  - OARRS reviewed, no aberrant behavior noted  - LDH>12,000 (now downtrending),  haptoglobin <30, fibrinogen 318, retic % 5.2 on presentation, bili 10.7  - Leukocytosis 24.8 on admission to MICU  - CXR (9/10) persistent atelectasis/scarring in the left lower lung  - CT CAP (9/11) Diffuse mosaic attenuation of the lung parenchyma, which can be seen in the setting of small airway disease, pulmonary edema or acute infection  - Intubated upon arrival to MICU, now s/p extubation 9/16  - Started on vanc and zosyn (finished both courses prior to floor transfer)  - Procal elevated 7.09 (9/11)  - Blood cultures 9/10, 9/12, 9/15 all NGTD  - Strep and Legionella Ag, MRSA nares negative  - Trop peak 1431, cards rec treat as ACS (see below)  - S/p 2u pRBCs 9/10 on arrival to MICU  - S/p exchange transfusion 9/11 AM for ACS  - Care plan from 12/6/23- For mild to moderate pain: Dilaudid 0.5mg IVP q3h as needed, for moderate to severe pain Dilaudid 1- 1.5mg q3h PRN  - On arrival to [facility], started on 0.6mg dilaudid IVP q3h PRN severe pain   - s/p 0.6mg dilaudid IVP q4h PRN breakthrough and 15mg PO oxycodone Q4 hrs severe pain (9/25)  - Per discussion with Dr. Whaley (9/25), okay to increase PO oxycodone to 15-20mg Q4hr to optimize pain for discharge to SNF.   - On 9/26, discontinued IV dilaudid and increased to 20mg PO oxycodone q4hrs for severe pain   - Opioids on hold as of 9/28 d/t AMS- resumed 2.5mg oxy q4hrs PRN severe pain on 10/2. ER oxy 10mg BID still held  - 10/5 increased pain reg as AMS resolved. Started oxycodone 10mg q3hrs PRN severe pain  - Continue to hold ER Oxycontin indefinitely as was major contributor to AMS (was started in ICU and was not on this prior to admission)  - Bowel regimen for opioid induced constipation with Senna 2tabs BID and Miralax BID  - Zofran for opioid induced nausea and benadrly for opioid induced pruritus  - Continue home Duloxetine 40mg daily, Folic Acid 1mg daily, and MVI daily     # ST depression with tropinemia, suspected 2/2 demand ischemia iso ACS  # Hx SVT  - Elevated  troponins on admission, peak 1431 and now downtrending   - EKG with ST depressions, likely Type II MI due to vaso-occlusive crisis and anemia  - TTE 9/11: EF 60-65%, RV enlarged and reduced in function which appears stable when compared to last TTE (1/2024)  - Troponin leak is likely due to cardiac demand vs supply mismatch in the setting of worsening anemia and vaso-occlusive crisis  - Cardiology consulted, now signed off with indication to treat troponemia as ACS  - Lasix held in MICU 2/2 hypotension and CHARLES, can resume as able   - Follow-up with Cardiology outpt, FUV 2/13/25     # Acute liver injury, improving  # Direct hyperbilirubinuria, improving  - Suspected 2/2 hemodynamic instability versus sickle cell crisis  - On presentation to  MICU ALT 2611, AST 4727, T bili 10.2, INR 3.0; continues to improve  - CT with hepatic venous congestion, patient lacks gallbladder  - Liver US with Doppler: Diffuse fatty infiltration, unremarkable doppler interrogation of the liver  - EBV IgG positive, IgM negative  - CMV IgG positive, PCR negative  - Acute hepatitis panel unremarkable  - Hepatology initially following, now signed off  - Will continue to monitor, trend daily CMP     # pHTN   - Initial c/f CTEPH as imaging findings with dilated PA, filling defects, and mosaicism  - VQ scan (6/13/23) with non anatomical basal defects and RUL defect due to scar--> not favoring CTEPH per Dr. Yi and Dr. 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 On lifelong anticoagulation, DOAC discouraged due to high risk of clotting   - home Coumadin 5mg daily initially HELD on admission to MICU 2/2 unstable course  - restarted coumadin 9/18 PM, will plan to bridge with bival, Ok'd by heme   - 9/20 INR 3.8, bival and home warfarin held. Pharmacy rec repeat INR 4 hours after bival was held. If the INR < 2, we can restart the bival, but if the INR 2-3, we can continue with warfarin monotherapy   - 9/20 repeat INR 3.7, pharmacy rec continuing to hold both medications  - 9/21 INR 3.2, per pharmacy - get repeat INR in AM 9/22, if INR between 2-3 can restart home warfarin 5mg daily  -INR 2.6 (9/24) after restarting Warfarin 5mg, will maintain INR goal of 2-3  - INR 3.4 (9/25). Held warfarin on 9/25. Repeat INR 9/26. Plan to restart if <3   - INR 2.8 (9/26), restarted warfarin, recheck INR 9/27   - INR 4.0 (9/30), held warfarin, repeat INR 10/1 ordered, plan to restart if <3  - INR 6.8 (10/1), patient without any active bleeding, per attending and pharmacy, warfarin held   - INR 6.8 (10/2), patient without any active bleeding, per attending and pharmacy, warfarin held   - INR 6.7 (10/3), cont to hold warfarin. Ordered 5mg IV Vit K x1 (ok per pharmacy) d/t ?bleeding, vaginal vs wound excoriation and UA +blood  - 10/4 INR normalized to 1.6, continue to hold warfarin  - 10/5 INR 1.2; started heparin drip given normal INR while pending apheresis line placement (previous HIT workup with negative PF4)  - 10/5 pt c/o severe LLE pain and swelling; duplex BLE pending  - Follows with Coumadin clinic outpatient  - Plan to consult vascular medicine if INR is not therapeutic on home regimen      # CAN  - Continue home CPAP and home Albuterol PRN     # H/O Cauda Equine syndrome  - Follows Dr. Delacruz as outpatient  - No current issues      DISPO:  - Full code- confirmed on  admission  - NOK: Tati Salgado (mother) (#[phone])  - PT/OT rec SNF, plan for rehab at [facility]  - DC to SNF pending improvement in pain, skin, INR, and RBCEx  - FUV  Anim 10/7, Cardiology 2/13

## 2024-10-05 NOTE — PROGRESS NOTES
Senait Narvaez is a 54 y.o. female on day 25 of admission presenting with Sickle cell disease with crisis and other complication.    Transitional Care Coordinator Note: Provider update- sickle cell,- pain control  ADOD mid week. Discharge to [facility]. Awaiting OT eval to ask for precert initiation- TCC spoke with therapy office to request stat eval including updating WB.  TCC team to follow. Josephine Santiago RN TCC via Epic.

## 2024-10-05 NOTE — CARE PLAN
The patient's goals for the shift include      The clinical goals for the shift include patient will rate mega less than 9/10 this shift 10/5/24 1900      Problem: Skin  Goal: Decreased wound size/increased tissue granulation at next dressing change  10/5/2024 0903 by Kelin Rivera RN  Outcome: Progressing  10/5/2024 0903 by Kelin Rivera RN  Outcome: Progressing  Goal: Participates in plan/prevention/treatment measures  10/5/2024 0903 by Kelin Rivera RN  Outcome: Progressing  10/5/2024 0903 by Kelin Rivera RN  Outcome: Progressing  Goal: Prevent/manage excess moisture  10/5/2024 0903 by Kelin Rivera RN  Outcome: Progressing  Flowsheets (Taken 10/5/2024 0903)  Prevent/manage excess moisture: Cleanse incontinence/protect with barrier cream  10/5/2024 0903 by Kelin Rivera RN  Outcome: Progressing  Flowsheets (Taken 10/5/2024 0903)  Prevent/manage excess moisture: Cleanse incontinence/protect with barrier cream  Goal: Prevent/minimize sheer/friction injuries  10/5/2024 0903 by Kelin Rivera RN  Outcome: Progressing  10/5/2024 0903 by Kelin Rivera RN  Outcome: Progressing  Goal: Promote/optimize nutrition  10/5/2024 0903 by Kelin Rivera RN  Outcome: Progressing  10/5/2024 0903 by Kelin Rivera RN  Outcome: Progressing  Goal: Promote skin healing  10/5/2024 0903 by Kelin Rivera RN  Outcome: Progressing  10/5/2024 0903 by Kelin Rivera RN  Outcome: Progressing     Problem: Safety - Adult  Goal: Free from fall injury  10/5/2024 0903 by Kelin Rivera RN  Outcome: Progressing  10/5/2024 0903 by Kelin Rivera RN  Outcome: Progressing     Problem: Pain - Adult  Goal: Verbalizes/displays adequate comfort level or baseline comfort level  10/5/2024 0903 by Kelin Rivera RN  Outcome: Progressing  10/5/2024 0903 by Kelin Rivera RN  Outcome: Progressing

## 2024-10-05 NOTE — CARE PLAN
The patient's goals for the shift include      The clinical goals for the shift include patient will rate mega less than 9/10 this shift 10/5/24 1900      Problem: Skin  Goal: Decreased wound size/increased tissue granulation at next dressing change  Outcome: Progressing  Goal: Participates in plan/prevention/treatment measures  Outcome: Progressing  Goal: Prevent/manage excess moisture  Outcome: Progressing  Goal: Prevent/minimize sheer/friction injuries  Outcome: Progressing  Goal: Promote/optimize nutrition  Outcome: Progressing  Goal: Promote skin healing  Outcome: Progressing     Problem: Safety - Adult  Goal: Free from fall injury  Outcome: Progressing     Problem: Pain - Adult  Goal: Verbalizes/displays adequate comfort level or baseline comfort level  Outcome: Progressing

## 2024-10-05 NOTE — ASSESSMENT & PLAN NOTE
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 Patient was subsequently intubated due to worsening lethargic and lack of respiratory compensation from significant metabolic acidosis with concern for ACS. Vancomycin and Zosyn started for empiric coverage, patient also started on pressors for BP support. Patient was transfused 2 units pRBCs and then underwent RBCEx on 9/11. Cardiology consulted for c/f NSTEMI vs demand ischemia (ST depressions and elevated troponin), rec treat as ACS. Course further c/b severe CHARLES with peak sCr of 5.4, and acute liver injury with severely elevated liver enzymes (ALT 4119, AST >10k). Liver US only remarkable for fatty infiltration, viral hepatitis panel negative. CHARLES and liver injury improved with supportive management. Pruritic rash noted 9/14, Derm consulted and took multiple biopsies 9/15. Rash then began to worsen to involve the groin, lateral thigh, and scalp with numerous tense bullae. Discontinued heparin given concerns for potential HIT, started bivalirudin. Patient was extubated on 9/16/2024. Started stress dose steroids given continued pressor requirements, pressors Dc'd 9/17.     Patient transferred to [facility] 9/17, PT/OT rec SNF. Coumadin bridge was started 9/18. Given persistent rash with unremarkable labs, derm re-biopsied on 9/18, findings ultimately felt to be most c/w fixed drug eruption. Dermatology contacted for bx results (9/20) rec triamcinolone cream BID and interdry cloths. Leukocytosis uptrending 9/21, repeat infectious workup negative (CXR, Abd XR, blood cultures, MRSA negative  9/21). UA with leuks. On 9/25, per nursing and patient, rash seems to be worsening. Derm re-engaged recommended to apply vasoline to all eroded/denuded areas and continue triamcinolone cream to both the black plaques as well as the red areas across the trunk and thighs. 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 Wound care following. For warfarin dosing, per pharmacy, if INR between 2-3 can restart home warfarin 5mg daily, if INR < 2 restart bival. INR 3.4 on 9/25, warfarin stopped, repeat INR 2.8 9/26 and warfarin restarted. On 9/26, increased PO oxycodone and discontinued IV dilaudid in anticipation for discharge. Overnight on 9/29, brain attack called out of concern for change in mental status, no focal deficits noted, BAT cancelled and narcan administered x3 with improvement of mental status. Mentation improving as of 10/2, small dose oxy resumed. INR remains supratherapeutic 10/3 at 6.7, 5mg IV Vit K x1 given (ok per pharmacy) I/s/o ?bleeding from vagina vs wound excoriation and UA with +blood. INR  down to 1.5 (10/4). Plan for RBCEx prior to dc per Dr. Whaley, likely Tuesday 8/8. 10/5 ordered duplex BLE as pt c/o severe LLE pain and swelling. 10/5 started heparin drip given normal INR while pending apheresis line placement (previous HIT workup with negative PF4). DC to SNF pending improvement in pain, skin, INR, and RBCEx.      # Waxing and waning AMS-- improved  - Felt to be due to opioids i/s/o worsening renal function and c/b respiratory acidosis  - All opioids on hold (9/28-10/2 resumed small dose oxy)  - Keep supplemental O2 on board, but titrate to maintain SPO2 of 90-92% at most  - On CPAP at home, enforce nightly use to extent possible; may need BiPAP if unresolved     # CHARLES on CKD II, improving  - Baseline ~ SCr <2, peak 5.4 this admission. 9/21 sCr 1.58 --> 1.13 (9/25)--> 1.14 (10/3)--> 1.02 (10/4)  -  Likely pre-renal vs ATN given persistant hypotension over admission, on CKD II, now polyuric phase  - FeNA 0.4%  - Severe hyperkalemia with previous peaked T waves on EKG, resolved  - Nephrology following  - s/p multiple K cocktails in MICU  - Encourage increased PO intake     # Purpuric rash, suspected fixed drug eruption  # Concern for possible heparin-induced thrombocytopenia (HIT) -resolved  - Purple, reticular pattern of plaques noted around the bilateral breasts, scalp and groin,  with tense bullae. Non-erythematous, no purulence  - Some initial concern for possible heparin-induced thrombocytopenia with concurrent skin findings. Discontinued heparin 9/16 and transitioned to bivalirudin  - 4 T score 0, PF4 w/ reflex BINA negative. Hematology ultimately consulted, felt there was low concern for HIT and signed off  - Differential diagnosis includes purpura secondary to infection versus coagulopathy versus vasculopathy versus small and/or medium vessel vasculitis versus calciphylaxis vs drug rash  - Dermatology consulted, work-up to date:  - Low Protein C activity  - ANCA, PR3 and MPO negative  - ISABELLE neg  - cryoglobulin labs- not enough specimen to analyze  - S/p skin punch biopsies x 2 9/15 and one on 9/18, showed SUPERFICIAL EPIDERMAL DEGENERATION WITH ERYTHROCYTE EXTRAVASATION WITH NEUTROPHILS. These findings could be seen secondary to an older trauma, or resolving erythema multiforme, a fixed drug eruption, Edgar-Christopher syndrome, or toxic epidermal necrolysis. Favor Multifocal Fixed Drug Eruption    - Dermatology contacted (9/20) about results, recommended adding vancomycin to allergy list and starting triamcinolone  - Continue triamcinolone 0.1% cream BID (9/20-current) per derm recs  - Plan for left breat suture removal around 9/25-9/27 (per derm, sutures from flank were dissolvable)  - On 9/25, concern from pt and nursing that rash is not improving. Under breasts and groin still causing pain. Bilateral  upper thighs and forehead rash improving   - Wound care re-consulted 9/25, s/o 10/3 as derm gave recs below  - Re-engaged dermatology on 9/25 given worsening rash; recommended to apply vasoline to all eroded/denuded areas and continue triamcinolone cream to both the black plaques as well as the red areas across the trunk and thighs. Per dermatology, erythema is mild and not palpable, it is most likely part of the drug eruption the patient has previously been known to have     # Leukocytosis , improving  - Likely I/s/o rash vs reactive vs infectious process  - Leukocytosis noted on admission to MICU (WBC 24.8) --> 12.7 (9/25) --> 8.3 (10/3)  - Leukocytosis improved throughout hospital stay however started uptrending 9/20; now improving on 9/25   - Blood cultures 9/10, 9/12, 9/15 all NGTD  - Strep and Legionella Ag, MRSA nares negative  - CXR (9/21) largely unremarkable, again re-demonstrated perihilar prominence seen on prior XR  - Abdominal xray (9/21) unremarkable  - Repeat blood cultures x 2 (9/21) NGTD  - UA (9/21) with 500 LE  - UA (9/30) +bacteria, +leuks, urine culture (9/29) mult organisms  - UA sent 10/3 with +blood, leuks, and WBC- not reflex to cx so UCX added on to 10/3 UA and again showed multiple organisms  - Will hold off tx for UTI for now as pt asymptomatic     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  # ACS  - Previously managed through routine RBC exchanges q6 weeks, most recent RBCEx 3/1/24, per patient pausing on transfusions for time being  - OARRS reviewed, no aberrant behavior noted  - LDH>12,000 (now downtrending), haptoglobin <30, fibrinogen 318, retic % 5.2 on presentation, bili 10.7  - Leukocytosis 24.8 on admission to MICU  - CXR (9/10) persistent atelectasis/scarring in the left lower lung  - CT CAP (9/11) Diffuse mosaic attenuation of the lung parenchyma, which can be seen in the setting of small airway disease, pulmonary edema or acute infection  - Intubated upon arrival to MICU, now s/p  extubation 9/16  - Started on vanc and zosyn (finished both courses prior to floor transfer)  - Procal elevated 7.09 (9/11)  - Blood cultures 9/10, 9/12, 9/15 all NGTD  - Strep and Legionella Ag, MRSA nares negative  - Trop peak 1431, cards rec treat as ACS (see below)  - S/p 2u pRBCs 9/10 on arrival to MICU  - S/p exchange transfusion 9/11 AM for ACS  - Care plan from 12/6/23- For mild to moderate pain: Dilaudid 0.5mg IVP q3h as needed, for moderate to severe pain Dilaudid 1- 1.5mg q3h PRN  - On arrival to [facility], started on 0.6mg dilaudid IVP q3h PRN severe pain   - s/p 0.6mg dilaudid IVP q4h PRN breakthrough and 15mg PO oxycodone Q4 hrs severe pain (9/25)  - Per discussion with Dr. Whaley (9/25), okay to increase PO oxycodone to 15-20mg Q4hr to optimize pain for discharge to SNF.   - On 9/26, discontinued IV dilaudid and increased to 20mg PO oxycodone q4hrs for severe pain   - Opioids on hold as of 9/28 d/t AMS- resumed 2.5mg oxy q4hrs PRN severe pain on 10/2. ER oxy 10mg BID still held  - 10/5 increased pain reg as AMS resolved. Started oxycodone 10mg q3hrs PRN severe pain  - Continue to hold ER Oxycontin indefinitely as was major contributor to AMS (was started in ICU and was not on this prior to admission)  - Bowel regimen for opioid induced constipation with Senna 2tabs BID and Miralax BID  - Zofran for opioid induced nausea and benadrly for opioid induced pruritus  - Continue home Duloxetine 40mg daily, Folic Acid 1mg daily, and MVI daily     # ST depression with tropinemia, suspected 2/2 demand ischemia iso ACS  # Hx SVT  - Elevated troponins on admission, peak 1431 and now downtrending   - EKG with ST depressions, likely Type II MI due to vaso-occlusive crisis and anemia  - TTE 9/11: EF 60-65%, RV enlarged and reduced in function which appears stable when compared to last TTE (1/2024)  - Troponin leak is likely due to cardiac demand vs supply mismatch in the setting of worsening anemia and vaso-occlusive  crisis  - Cardiology consulted, now signed off with indication to treat troponemia as ACS  - Lasix held in MICU 2/2 hypotension and CHARLES, can resume as able   - Follow-up with Cardiology outpt, FUV 2/13/25     # Acute liver injury, improving  # Direct hyperbilirubinuria, improving  - Suspected 2/2 hemodynamic instability versus sickle cell crisis  - On presentation to  MICU ALT 2611, AST 4727, T bili 10.2, INR 3.0; continues to improve  - CT with hepatic venous congestion, patient lacks gallbladder  - Liver US with Doppler: Diffuse fatty infiltration, unremarkable doppler interrogation of the liver  - EBV IgG positive, IgM negative  - CMV IgG positive, PCR negative  - Acute hepatitis panel unremarkable  - Hepatology initially following, now signed off  - Will continue to monitor, trend daily CMP     # pHTN   - Initial c/f CTEPH as imaging findings with dilated PA, filling defects, and mosaicism  - VQ scan (6/13/23) with non anatomical basal defects and RUL defect due to scar--> not favoring CTEPH per Dr. Yi and Dr. 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 On lifelong anticoagulation, DOAC discouraged due to high risk of clotting   - home Coumadin 5mg daily initially HELD on admission to MICU 2/2 unstable course  - restarted coumadin 9/18 PM, will plan to bridge with bival, Ok'd by heme   - 9/20 INR 3.8, bival and home  warfarin held. Pharmacy rec repeat INR 4 hours after bival was held. If the INR < 2, we can restart the bival, but if the INR 2-3, we can continue with warfarin monotherapy   - 9/20 repeat INR 3.7, pharmacy rec continuing to hold both medications  - 9/21 INR 3.2, per pharmacy - get repeat INR in AM 9/22, if INR between 2-3 can restart home warfarin 5mg daily  -INR 2.6 (9/24) after restarting Warfarin 5mg, will maintain INR goal of 2-3  - INR 3.4 (9/25). Held warfarin on 9/25. Repeat INR 9/26. Plan to restart if <3   - INR 2.8 (9/26), restarted warfarin, recheck INR 9/27   - INR 4.0 (9/30), held warfarin, repeat INR 10/1 ordered, plan to restart if <3  - INR 6.8 (10/1), patient without any active bleeding, per attending and pharmacy, warfarin held   - INR 6.8 (10/2), patient without any active bleeding, per attending and pharmacy, warfarin held   - INR 6.7 (10/3), cont to hold warfarin. Ordered 5mg IV Vit K x1 (ok per pharmacy) d/t ?bleeding, vaginal vs wound excoriation and UA +blood  - 10/4 INR normalized to 1.6, continue to hold warfarin  - 10/5 INR 1.2; started heparin drip given normal INR while pending apheresis line placement (previous HIT workup with negative PF4)  - 10/5 pt c/o severe LLE pain and swelling; duplex BLE pending  - Follows with Coumadin clinic outpatient  - Plan to consult vascular medicine if INR is not therapeutic on home regimen      # CAN  - Continue home CPAP and home Albuterol PRN     # H/O Cauda Equine syndrome  - Follows Dr. Delacruz as outpatient  - No current issues      DISPO:  - Full code- confirmed on admission  - NOK: Tati Salgado (mother) (#[phone])  - PT/OT rec SNF, plan for rehab at [facility] [place]  - DC to SNF pending improvement in pain, skin, INR, and RBCEx  - FUV  Anim 10/7, Cardiology 2/13

## 2024-10-06 LAB
ALBUMIN SERPL BCP-MCNC: 2.5 G/DL (ref 3.4–5)
ALP SERPL-CCNC: 129 U/L (ref 33–110)
ALT SERPL W P-5'-P-CCNC: 18 U/L (ref 7–45)
ANION GAP SERPL CALC-SCNC: 10 MMOL/L (ref 10–20)
APTT PPP: >200 SECONDS (ref 27–38)
AST SERPL W P-5'-P-CCNC: 27 U/L (ref 9–39)
BASOPHILS # BLD AUTO: 0.04 X10*3/UL (ref 0–0.1)
BASOPHILS NFR BLD AUTO: 0.4 %
BILIRUB SERPL-MCNC: 2.3 MG/DL (ref 0–1.2)
BUN SERPL-MCNC: 8 MG/DL (ref 6–23)
CALCIUM SERPL-MCNC: 8.6 MG/DL (ref 8.6–10.6)
CHLORIDE SERPL-SCNC: 100 MMOL/L (ref 98–107)
CO2 SERPL-SCNC: 33 MMOL/L (ref 21–32)
CREAT SERPL-MCNC: 0.96 MG/DL (ref 0.5–1.05)
EGFRCR SERPLBLD CKD-EPI 2021: 70 ML/MIN/1.73M*2
EOSINOPHIL # BLD AUTO: 0.37 X10*3/UL (ref 0–0.7)
EOSINOPHIL NFR BLD AUTO: 3.4 %
ERYTHROCYTE [DISTWIDTH] IN BLOOD BY AUTOMATED COUNT: 18.6 % (ref 11.5–14.5)
GLUCOSE BLD MANUAL STRIP-MCNC: 68 MG/DL (ref 74–99)
GLUCOSE BLD MANUAL STRIP-MCNC: 93 MG/DL (ref 74–99)
GLUCOSE SERPL-MCNC: 74 MG/DL (ref 74–99)
HCT VFR BLD AUTO: 27.1 % (ref 36–46)
HGB BLD-MCNC: 8.9 G/DL (ref 12–16)
HGB RETIC QN: 29 PG (ref 28–38)
IMM GRANULOCYTES # BLD AUTO: 0.06 X10*3/UL (ref 0–0.7)
IMM GRANULOCYTES NFR BLD AUTO: 0.6 % (ref 0–0.9)
IMMATURE RETIC FRACTION: 15.1 %
INR PPP: 1.4 (ref 0.9–1.1)
LDH SERPL L TO P-CCNC: 248 U/L (ref 84–246)
LYMPHOCYTES # BLD AUTO: 1.54 X10*3/UL (ref 1.2–4.8)
LYMPHOCYTES NFR BLD AUTO: 14.2 %
MAGNESIUM SERPL-MCNC: 1.52 MG/DL (ref 1.6–2.4)
MCH RBC QN AUTO: 29.2 PG (ref 26–34)
MCHC RBC AUTO-ENTMCNC: 32.8 G/DL (ref 32–36)
MCV RBC AUTO: 89 FL (ref 80–100)
MONOCYTES # BLD AUTO: 1.37 X10*3/UL (ref 0.1–1)
MONOCYTES NFR BLD AUTO: 12.7 %
NEUTROPHILS # BLD AUTO: 7.45 X10*3/UL (ref 1.2–7.7)
NEUTROPHILS NFR BLD AUTO: 68.7 %
NRBC BLD-RTO: 13.6 /100 WBCS (ref 0–0)
PLATELET # BLD AUTO: 336 X10*3/UL (ref 150–450)
POTASSIUM SERPL-SCNC: 3.6 MMOL/L (ref 3.5–5.3)
PROT SERPL-MCNC: 5.9 G/DL (ref 6.4–8.2)
PROTHROMBIN TIME: 15.5 SECONDS (ref 9.8–12.8)
RBC # BLD AUTO: 3.05 X10*6/UL (ref 4–5.2)
RETICS #: 0.41 X10*6/UL (ref 0.02–0.08)
RETICS/RBC NFR AUTO: 13.6 % (ref 0.5–2)
SODIUM SERPL-SCNC: 139 MMOL/L (ref 136–145)
UFH PPP CHRO-ACNC: 0.4 IU/ML
UFH PPP CHRO-ACNC: 0.8 IU/ML
UFH PPP CHRO-ACNC: 0.9 IU/ML
UFH PPP CHRO-ACNC: 1 IU/ML
WBC # BLD AUTO: 10.8 X10*3/UL (ref 4.4–11.3)

## 2024-10-06 PROCEDURE — 82947 ASSAY GLUCOSE BLOOD QUANT: CPT

## 2024-10-06 PROCEDURE — 85520 HEPARIN ASSAY: CPT | Performed by: NURSE PRACTITIONER

## 2024-10-06 PROCEDURE — 2500000001 HC RX 250 WO HCPCS SELF ADMINISTERED DRUGS (ALT 637 FOR MEDICARE OP)

## 2024-10-06 PROCEDURE — 85045 AUTOMATED RETICULOCYTE COUNT: CPT

## 2024-10-06 PROCEDURE — 85025 COMPLETE CBC W/AUTO DIFF WBC: CPT | Performed by: NURSE PRACTITIONER

## 2024-10-06 PROCEDURE — 1170000001 HC PRIVATE ONCOLOGY ROOM DAILY

## 2024-10-06 PROCEDURE — 36415 COLL VENOUS BLD VENIPUNCTURE: CPT

## 2024-10-06 PROCEDURE — 83615 LACTATE (LD) (LDH) ENZYME: CPT

## 2024-10-06 PROCEDURE — 36415 COLL VENOUS BLD VENIPUNCTURE: CPT | Performed by: NURSE PRACTITIONER

## 2024-10-06 PROCEDURE — 99233 SBSQ HOSP IP/OBS HIGH 50: CPT | Performed by: NURSE PRACTITIONER

## 2024-10-06 PROCEDURE — 2500000005 HC RX 250 GENERAL PHARMACY W/O HCPCS: Performed by: NURSE PRACTITIONER

## 2024-10-06 PROCEDURE — 83735 ASSAY OF MAGNESIUM: CPT | Performed by: NURSE PRACTITIONER

## 2024-10-06 PROCEDURE — 85610 PROTHROMBIN TIME: CPT

## 2024-10-06 PROCEDURE — 2500000001 HC RX 250 WO HCPCS SELF ADMINISTERED DRUGS (ALT 637 FOR MEDICARE OP): Performed by: NURSE PRACTITIONER

## 2024-10-06 PROCEDURE — 2500000004 HC RX 250 GENERAL PHARMACY W/ HCPCS (ALT 636 FOR OP/ED)

## 2024-10-06 PROCEDURE — 85730 THROMBOPLASTIN TIME PARTIAL: CPT

## 2024-10-06 PROCEDURE — 80053 COMPREHEN METABOLIC PANEL: CPT | Performed by: NURSE PRACTITIONER

## 2024-10-06 PROCEDURE — 2500000004 HC RX 250 GENERAL PHARMACY W/ HCPCS (ALT 636 FOR OP/ED): Performed by: NURSE PRACTITIONER

## 2024-10-06 RX ORDER — LANOLIN ALCOHOL/MO/W.PET/CERES
100 CREAM (GRAM) TOPICAL DAILY
Status: DISCONTINUED | OUTPATIENT
Start: 2024-10-06 | End: 2024-10-21 | Stop reason: HOSPADM

## 2024-10-06 RX ORDER — GUAIFENESIN 600 MG/1
600 TABLET, EXTENDED RELEASE ORAL 2 TIMES DAILY PRN
Status: DISCONTINUED | OUTPATIENT
Start: 2024-10-06 | End: 2024-10-21 | Stop reason: HOSPADM

## 2024-10-06 RX ORDER — MAGNESIUM SULFATE HEPTAHYDRATE 40 MG/ML
2 INJECTION, SOLUTION INTRAVENOUS ONCE
Status: COMPLETED | OUTPATIENT
Start: 2024-10-06 | End: 2024-10-06

## 2024-10-06 ASSESSMENT — COGNITIVE AND FUNCTIONAL STATUS - GENERAL
DRESSING REGULAR LOWER BODY CLOTHING: A LITTLE
WALKING IN HOSPITAL ROOM: A LOT
MOVING TO AND FROM BED TO CHAIR: A LITTLE
TOILETING: A LITTLE
TOILETING: A LITTLE
CLIMB 3 TO 5 STEPS WITH RAILING: A LOT
STANDING UP FROM CHAIR USING ARMS: A LITTLE
HELP NEEDED FOR BATHING: A LITTLE
DRESSING REGULAR UPPER BODY CLOTHING: A LITTLE
STANDING UP FROM CHAIR USING ARMS: A LITTLE
DAILY ACTIVITIY SCORE: 20
CLIMB 3 TO 5 STEPS WITH RAILING: A LOT
WALKING IN HOSPITAL ROOM: A LOT
HELP NEEDED FOR BATHING: A LITTLE
DRESSING REGULAR LOWER BODY CLOTHING: A LITTLE
MOBILITY SCORE: 18
DAILY ACTIVITIY SCORE: 21
MOBILITY SCORE: 18
MOVING TO AND FROM BED TO CHAIR: A LITTLE

## 2024-10-06 ASSESSMENT — PAIN - FUNCTIONAL ASSESSMENT
PAIN_FUNCTIONAL_ASSESSMENT: 0-10

## 2024-10-06 ASSESSMENT — PAIN SCALES - GENERAL
PAINLEVEL_OUTOF10: 8
PAINLEVEL_OUTOF10: 0 - NO PAIN
PAINLEVEL_OUTOF10: 8
PAINLEVEL_OUTOF10: 0 - NO PAIN
PAINLEVEL_OUTOF10: 7
PAINLEVEL_OUTOF10: 8

## 2024-10-06 NOTE — PROGRESS NOTES
"Senait Narvaez is a 54 y.o. female on day 26 of admission presenting with Sickle cell disease with crisis and other complication.    Subjective   Seen this AM at the bedside. Pt reports continued LLE pain, states that pain meds are helping however. She is happy with her current pain regimen. Discussed negative duplex results. Discussed plan for apheresis line placement tomorrow followed by RBCEx either tomorrow or Tuesday. Didn't wear her CPAP last night again, continued to encourage use. She denies any other complaints or concerns. Eating/drinking well. Having BMs. Denies any fevers/chills, SOB, or CP.       Objective     Physical Exam  Vitals reviewed.   Constitutional:       Appearance: Normal appearance. She is obese.   HENT:      Head: Normocephalic and atraumatic.      Nose: Nose normal.      Mouth/Throat:      Mouth: Mucous membranes are moist.      Pharynx: Oropharynx is clear.   Eyes:      Extraocular Movements: Extraocular movements intact.      Pupils: Pupils are equal, round, and reactive to light.   Cardiovascular:      Rate and Rhythm: Normal rate and regular rhythm.      Pulses: Normal pulses.      Heart sounds: Normal heart sounds.   Pulmonary:      Effort: Pulmonary effort is normal.      Breath sounds: Normal breath sounds.   Abdominal:      General: Bowel sounds are normal.      Palpations: Abdomen is soft.   Musculoskeletal:         General: Normal range of motion.   Skin:     General: Skin is warm.      Comments: Various wounds including b/l breast, LLE, and perineal excoriation   Neurological:      General: No focal deficit present.      Mental Status: She is alert and oriented to person, place, and time. Mental status is at baseline.   Psychiatric:         Mood and Affect: Mood normal.         Behavior: Behavior normal.         Last Recorded Vitals  Blood pressure 106/67, pulse 85, temperature 37.3 °C (99.1 °F), temperature source Temporal, resp. rate 18, height 1.651 m (5' 5\"), weight 113 kg " (248 lb 7.3 oz), SpO2 98%.  Intake/Output last 3 Shifts:  No intake/output data recorded.    Results for orders placed or performed during the hospital encounter of 09/10/24 (from the past 24 hour(s))   Heparin Assay, UFH   Result Value Ref Range    Heparin Unfractionated <0.1 See Comment Below for Therapeutic Ranges IU/mL   Heparin Assay, UFH   Result Value Ref Range    Heparin Unfractionated 0.4 See Comment Below for Therapeutic Ranges IU/mL   POCT GLUCOSE   Result Value Ref Range    POCT Glucose 68 (L) 74 - 99 mg/dL     *Note: Due to a large number of results and/or encounters for the requested time period, some results have not been displayed. A complete set of results can be found in Results Review.       Scheduled medications  folic acid, 1 mg, oral, Daily  [Held by provider] metoprolol tartrate, 12.5 mg, oral, BID  nystatin, 1 Application, Topical, BID  oxygen, , inhalation, Continuous - Inhalation  pantoprazole, 40 mg, intravenous, Daily  polyethylene glycol, 17 g, oral, BID  sennosides, 2 tablet, oral, BID  thiamine, 100 mg, intravenous, Daily  triamcinolone, , Topical, BID  [Held by provider] warfarin, 5 mg, oral, Daily  white petrolatum, , Topical, BID      Continuous medications  heparin, 0-4,500 Units/hr, Last Rate: 1,800 Units/hr (10/06/24 0339)      PRN medications  PRN medications: acetaminophen, albuterol, alteplase, bisacodyl, dextrose, dextrose, glucagon, glucagon, heparin, naloxone, ondansetron, oxyCODONE, oxygen, sodium chloride                      Assessment/Plan   Assessment & Plan  Sickle cell disease with crisis and other complication  Senait Narvaez is a 54 year-old female with a PMHx of Hgb SC disease (previously on exchange transfusions q4-6 weeks, last transfusion 3/1/24), hx of SVC syndrome, 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 Patient was subsequently intubated due to worsening lethargic and lack of respiratory compensation from significant metabolic acidosis with concern for ACS. Vancomycin and Zosyn started for empiric coverage, patient also started on pressors for BP support. Patient was transfused 2 units pRBCs and then underwent RBCEx on 9/11. Cardiology consulted for c/f NSTEMI vs demand ischemia (ST depressions and elevated troponin), rec treat as ACS. Course further c/b severe CHARLES with peak sCr of 5.4, and acute liver injury with severely elevated liver enzymes (ALT 4119, AST >10k). Liver US only remarkable for fatty infiltration, viral hepatitis panel negative. CHARLES and liver injury improved with supportive management. Pruritic rash noted 9/14, Derm consulted and took multiple biopsies 9/15. Rash then began to worsen to involve the groin, lateral thigh, and scalp with numerous tense bullae. Discontinued heparin given concerns for potential HIT, started bivalirudin. Patient was extubated on 9/16/2024. Started stress dose steroids given continued pressor requirements, pressors Dc'd 9/17.     Patient transferred to [facility] 9/17, PT/OT rec SNF. Coumadin bridge was started 9/18. Given persistent rash with unremarkable labs, derm re-biopsied on 9/18, findings ultimately felt to be most c/w fixed drug eruption. Dermatology contacted for bx results (9/20) rec triamcinolone cream BID and interdry cloths. Leukocytosis uptrending 9/21, repeat infectious workup negative (CXR, Abd XR, blood cultures, MRSA negative 9/21). UA with leuks. On 9/25, per nursing and patient, rash seems to be worsening. Derm re-engaged recommended to apply vasoline to all eroded/denuded areas and continue triamcinolone cream to both the black plaques as well as the red areas across the trunk and thighs. Per dermatology, erythema is mild and not palpable, it is most likely part of the drug  eruption the patient has previously been known to have. Wound care following. For warfarin dosing, per pharmacy, if INR between 2-3 can restart home warfarin 5mg daily, if INR < 2 restart bival. INR 3.4 on 9/25, warfarin stopped, repeat INR 2.8 9/26 and warfarin restarted. On 9/26, increased PO oxycodone and discontinued IV dilaudid in anticipation for discharge. Overnight on 9/29, brain attack called out of concern for change in mental status, no focal deficits noted, BAT cancelled and narcan administered x3 with improvement of mental status. Mentation improving as of 10/2, small dose oxy resumed. INR remains supratherapeutic 10/3 at 6.7, 5mg IV Vit K x1 given (ok per pharmacy) I/s/o ?bleeding from vagina vs wound excoriation and UA with +blood. INR  down to 1.5 (10/4). Plan for RBCEx prior to dc per Dr. Whaley, likely Tuesday 8/8. 10/5 ordered duplex BLE as pt c/o severe LLE pain and swelling which was negative. 10/5 started heparin drip given normal INR while pending apheresis line placement (previous HIT workup with negative PF4). DC to SNF pending improvement in pain, skin, INR, and RBCEx.      # Waxing and waning AMS-- improved  - Felt to be due to opioids i/s/o worsening renal function and c/b respiratory acidosis  - All opioids on hold (9/28-10/2 resumed small dose oxy)  - Keep supplemental O2 on board, but titrate to maintain SPO2 of 90-92% at most  - On CPAP at home, enforce nightly use to extent possible; may need BiPAP if unresolved     # CHARLES on CKD II, improving  - Baseline ~ SCr <2, peak 5.4 this admission. 9/21 sCr 1.58 --> 1.13 (9/25)--> 1.14 (10/3)--> 0.98 (10/5)  - Likely pre-renal vs ATN given persistant hypotension over admission, on CKD II, now polyuric phase  - FeNA 0.4%  - Severe hyperkalemia with previous peaked T waves on EKG, resolved  - Nephrology following  - s/p multiple K cocktails in MICU  - Encourage increased PO intake     # Purpuric rash, suspected fixed drug eruption  # Concern for  possible heparin-induced thrombocytopenia (HIT) -resolved  - Purple, reticular pattern of plaques noted around the bilateral breasts, scalp and groin,  with tense bullae. Non-erythematous, no purulence  - Some initial concern for possible heparin-induced thrombocytopenia with concurrent skin findings. Discontinued heparin 9/16 and transitioned to bivalirudin  - 4 T score 0, PF4 w/ reflex BINA negative. Hematology ultimately consulted, felt there was low concern for HIT and signed off  - Differential diagnosis includes purpura secondary to infection versus coagulopathy versus vasculopathy versus small and/or medium vessel vasculitis versus calciphylaxis vs drug rash  - Dermatology consulted, work-up to date:  - Low Protein C activity  - ANCA, PR3 and MPO negative  - ISABELLE neg  - cryoglobulin labs- not enough specimen to analyze  - S/p skin punch biopsies x 2 9/15 and one on 9/18, showed SUPERFICIAL EPIDERMAL DEGENERATION WITH ERYTHROCYTE EXTRAVASATION WITH NEUTROPHILS. These findings could be seen secondary to an older trauma, or resolving erythema multiforme, a fixed drug eruption, Edgar-Christopher syndrome, or toxic epidermal necrolysis. Favor Multifocal Fixed Drug Eruption    - Dermatology contacted (9/20) about results, recommended adding vancomycin to allergy list and starting triamcinolone  - Continue triamcinolone 0.1% cream BID (9/20-current) per derm recs  - Plan for left breat suture removal around 9/25-9/27 (per derm, sutures from flank were dissolvable)  - On 9/25, concern from pt and nursing that rash is not improving. Under breasts and groin still causing pain. Bilateral upper thighs and forehead rash improving   - Wound care re-consulted 9/25, s/o 10/3 as derm gave recs below (wound pictures documented in WC note from 10/3)  - Re-engaged dermatology on 9/25 given worsening rash; recommended to apply vasoline to all eroded/denuded areas and continue triamcinolone cream to both the black plaques as well as  the red areas across the trunk and thighs. Per dermatology, erythema is mild and not palpable, it is most likely part of the drug eruption the patient has previously been known to have     # Leukocytosis , improving  - Likely I/s/o rash vs reactive vs infectious process  - Leukocytosis noted on admission to MICU (WBC 24.8) --> 12.7 (9/25) --> 8.3 (10/3)  - Leukocytosis improved throughout hospital stay however started uptrending 9/20; now improving on 9/25   - Blood cultures 9/10, 9/12, 9/15 all NGTD  - Strep and Legionella Ag, MRSA nares negative  - CXR (9/21) largely unremarkable, again re-demonstrated perihilar prominence seen on prior XR  - Abdominal xray (9/21) unremarkable  - Repeat blood cultures x 2 (9/21) NGTD  - UA (9/21) with 500 LE  - UA (9/30) +bacteria, +leuks, urine culture (9/29) mult organisms  - UA sent 10/3 with +blood, leuks, and WBC- not reflex to cx so UCX added on to 10/3 UA and again showed multiple organisms  - Will hold off tx for UTI for now as pt asymptomatic     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  # ACS  - Previously managed through routine RBC exchanges q6 weeks, most recent RBCEx 3/1/24, per patient pausing on transfusions for time being  - OARRS reviewed, no aberrant behavior noted  - LDH>12,000 (now downtrending), haptoglobin <30, fibrinogen 318, retic % 5.2 on presentation, bili 10.7  - Leukocytosis 24.8 on admission to MICU  - CXR (9/10) persistent atelectasis/scarring in the left lower lung  - CT CAP (9/11) Diffuse mosaic attenuation of the lung parenchyma, which can be seen in the setting of small airway disease, pulmonary edema or acute infection  - Intubated upon arrival to MICU, now s/p extubation 9/16  - Started on vanc and zosyn (finished both courses prior to floor transfer)  - Procal elevated 7.09 (9/11)  - Blood cultures 9/10, 9/12, 9/15 all NGTD  - Strep and Legionella Ag, MRSA nares negative  - Trop peak 1431, cards rec treat as ACS (see below)  - S/p 2u pRBCs 9/10 on  arrival to MICU  - S/p exchange transfusion 9/11 AM for ACS  - Care plan from 12/6/23- For mild to moderate pain: Dilaudid 0.5mg IVP q3h as needed, for moderate to severe pain Dilaudid 1- 1.5mg q3h PRN  - On arrival to [facility], started on 0.6mg dilaudid IVP q3h PRN severe pain   - s/p 0.6mg dilaudid IVP q4h PRN breakthrough and 15mg PO oxycodone Q4 hrs severe pain (9/25)  - Per discussion with Dr. Whaley (9/25), okay to increase PO oxycodone to 15-20mg Q4hr to optimize pain for discharge to SNF.   - On 9/26, discontinued IV dilaudid and increased to 20mg PO oxycodone q4hrs for severe pain   - Opioids on hold as of 9/28 d/t AMS- resumed 2.5mg oxy q4hrs PRN severe pain on 10/2. ER oxy 10mg BID still held  - 10/5 increased pain reg as AMS resolved. Started oxycodone 10mg q3hrs PRN severe pain  - Continue to hold ER Oxycontin indefinitely as was major contributor to AMS (was started in ICU and was not on this prior to admission)  - Bowel regimen for opioid induced constipation with Senna 2tabs BID, Miralax BID, and dulcolax suppository 10mg PRN constipation  - Zofran for opioid induced nausea and benadrly for opioid induced pruritus  - Continue home Folic Acid 1mg daily     # ST depression with tropinemia, suspected 2/2 demand ischemia iso ACS  # Hx SVT  - Elevated troponins on admission, peak 1431 and now downtrending   - EKG with ST depressions, likely Type II MI due to vaso-occlusive crisis and anemia  - TTE 9/11: EF 60-65%, RV enlarged and reduced in function which appears stable when compared to last TTE (1/2024)  - Troponin leak is likely due to cardiac demand vs supply mismatch in the setting of worsening anemia and vaso-occlusive crisis  - Cardiology consulted, now signed off with indication to treat troponemia as ACS  - Lasix held in MICU 2/2 hypotension and CHARLES, can resume as able   - Metop 12.5mg BID held in MICU 2/2 hypotension- BP and HR continue to remain stable, holding off on restarting for now  - Follow-up  with Cardiology outpt, FUV 2/13/25     # Acute liver injury, improving  # Direct hyperbilirubinuria, improving  - Suspected 2/2 hemodynamic instability versus sickle cell crisis  [facility] T bili 10.2, INR 3.0; continues to improve  - CT with hepatic venous congestion, patient lacks gallbladder  - Liver US with Doppler: Diffuse fatty infiltration, unremarkable doppler interrogation of the liver  - EBV IgG positive, IgM negative  - CMV IgG positive, PCR negative  - Acute hepatitis panel unremarkable  - Hepatology initially following, now signed off  - Will continue to monitor, trend daily CMP     # pHTN   - Initial c/f CTEPH as imaging findings with dilated PA, filling defects, and mosaicism  - VQ scan (6/13/23) with non anatomical basal defects and RUL defect due to scar--> not favoring CTEPH per Dr. Yi and Dr. 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 On lifelong anticoagulation, DOAC discouraged due to high risk of clotting   - home Coumadin 5mg daily initially HELD on admission to MICU 2/2 unstable course  - restarted coumadin 9/18 PM, will plan to bridge with bival, Ok'd by heme   - 9/20 INR 3.8, bival and home warfarin held. Pharmacy rec repeat INR 4 hours after bival was held. If the INR < 2, we can restart the bival, but if the INR 2-3, we can continue with warfarin  monotherapy   - 9/20 repeat INR 3.7, pharmacy rec continuing to hold both medications  - 9/21 INR 3.2, per pharmacy - get repeat INR in AM 9/22, if INR between 2-3 can restart home warfarin 5mg daily  -INR 2.6 (9/24) after restarting Warfarin 5mg, will maintain INR goal of 2-3  - INR 3.4 (9/25). Held warfarin on 9/25. Repeat INR 9/26. Plan to restart if <3   - INR 2.8 (9/26), restarted warfarin, recheck INR 9/27   - INR 4.0 (9/30), held warfarin, repeat INR 10/1 ordered, plan to restart if <3  - INR 6.8 (10/1), patient without any active bleeding, per attending and pharmacy, warfarin held   - INR 6.8 (10/2), patient without any active bleeding, per attending and pharmacy, warfarin held   - INR 6.7 (10/3), cont to hold warfarin. Ordered 5mg IV Vit K x1 (ok per pharmacy) d/t ?bleeding, vaginal vs wound excoriation and UA +blood  - 10/4 INR normalized to 1.6, continue to hold warfarin  - 10/5 INR 1.2; started heparin drip given normal INR while pending apheresis line placement (previous HIT workup with negative PF4)--> plan to hold 10/7 at 0400 for apheresis line placement  - 10/5 pt c/o severe LLE pain and swelling; duplex BLE negative  - Follows with Coumadin clinic outpatient  - Plan to consult vascular medicine if INR is not therapeutic on home regimen      # CAN  - Continue home CPAP and home Albuterol PRN     # H/O Cauda Equine syndrome  - Follows Dr. Delacruz as outpatient  - No current issues      DISPO:  - Full code- confirmed on admission  - NOK: Tati Salgado (mother) (#[phone])  - PT/OT rec SNF, plan for rehab at [facility]  - DC to SNF pending improvement in pain, skin, INR, and RBCEx  - FUV Dr. Whaley 10/7 (requested reschedule), Cardiology 2/13    I spent >60 minutes in the professional and overall care of this patient    Assessment and plan as above discussed with attending physician Dr. Luis Angel Hunt, APRN-CNP

## 2024-10-06 NOTE — CARE PLAN
The patient's goals for the shift include      The clinical goals for the shift include patient will remain hds this shift 10/6/24 1900      Problem: Pain - Adult  Goal: Verbalizes/displays adequate comfort level or baseline comfort level  Outcome: Progressing     Problem: Safety - Adult  Goal: Free from fall injury  Outcome: Progressing     Problem: Skin  Goal: Decreased wound size/increased tissue granulation at next dressing change  Outcome: Progressing  Goal: Participates in plan/prevention/treatment measures  Outcome: Progressing  Goal: Prevent/manage excess moisture  Outcome: Progressing  Flowsheets (Taken 10/6/2024 0903)  Prevent/manage excess moisture: Cleanse incontinence/protect with barrier cream  Goal: Prevent/minimize sheer/friction injuries  Outcome: Progressing  Goal: Promote/optimize nutrition  Outcome: Progressing  Goal: Promote skin healing  Outcome: Progressing

## 2024-10-06 NOTE — CARE PLAN
Problem: Pain - Adult  Goal: Verbalizes/displays adequate comfort level or baseline comfort level  Outcome: Progressing

## 2024-10-06 NOTE — NURSING NOTE
The RN contacted the core laboratory regarding the status of the coagulation machines utilized for heparin assays. It was communicated that the machines are currently non-operational, with no estimated time of restoration provided. However, maintenance personnel are expected to arrive this morning to address and resolve the existing issues

## 2024-10-06 NOTE — ASSESSMENT & PLAN NOTE
Senait Narvaez is a 54 year-old female with a PMHx of Hgb SC disease (previously on exchange transfusions q4-6 weeks, last transfusion 3/1/24), hx of SVC syndrome, recurrent DVT/PE (on lifelong Coumadin), pHTN (6/2023), cauda equina with saddle numbness, hx SVT, fibromyalgia, Raynaud's disease, CKD II (baseline SCr~<2) and CAN who presented to [facility] ED for diffuse pain and lethargy, then transferred to  MICU for hemodynamic instability. Patient was subsequently intubated due to worsening lethargic and lack of respiratory compensation from significant metabolic acidosis with concern for ACS. Vancomycin and Zosyn started for empiric coverage, patient also started on pressors for BP support. Patient was transfused 2 units pRBCs and then underwent RBCEx on 9/11. Cardiology consulted for c/f NSTEMI vs demand ischemia (ST depressions and elevated troponin), rec treat as ACS. Course further c/b severe CHARLES with peak sCr of 5.4, and acute liver injury with severely elevated liver enzymes (ALT 4119, AST >10k). Liver US only remarkable for fatty infiltration, viral hepatitis panel negative. CHARLES and liver injury improved with supportive management. Pruritic rash noted 9/14, Derm consulted and took multiple biopsies 9/15. Rash then began to worsen to involve the groin, lateral thigh, and scalp with numerous tense bullae. Discontinued heparin given concerns for potential HIT, started bivalirudin. Patient was extubated on 9/16/2024. Started stress dose steroids given continued pressor requirements, pressors Dc'd 9/17.     Patient transferred to [facility] 9/17, PT/OT rec SNF. Coumadin bridge was started 9/18. Given persistent rash with unremarkable labs, derm re-biopsied on 9/18, findings ultimately felt to be most c/w fixed drug eruption. Dermatology contacted for bx results (9/20) rec triamcinolone cream BID and interdry cloths. Leukocytosis uptrending 9/21, repeat infectious workup negative (CXR, Abd XR, blood cultures, MRSA negative  9/21). UA with leuks. On 9/25, per nursing and patient, rash seems to be worsening. Derm re-engaged recommended to apply vasoline to all eroded/denuded areas and continue triamcinolone cream to both the black plaques as well as the red areas across the trunk and thighs. 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 Wound care following. For warfarin dosing, per pharmacy, if INR between 2-3 can restart home warfarin 5mg daily, if INR < 2 restart bival. INR 3.4 on 9/25, warfarin stopped, repeat INR 2.8 9/26 and warfarin restarted. On 9/26, increased PO oxycodone and discontinued IV dilaudid in anticipation for discharge. Overnight on 9/29, brain attack called out of concern for change in mental status, no focal deficits noted, BAT cancelled and narcan administered x3 with improvement of mental status. Mentation improving as of 10/2, small dose oxy resumed. INR remains supratherapeutic 10/3 at 6.7, 5mg IV Vit K x1 given (ok per pharmacy) I/s/o ?bleeding from vagina vs wound excoriation and UA with +blood. INR  down to 1.5 (10/4). Plan for RBCEx prior to dc per Dr. Whaley, likely Tuesday 8/8. 10/5 ordered duplex BLE as pt c/o severe LLE pain and swelling which was negative. 10/5 started heparin drip given normal INR while pending apheresis line placement (previous HIT workup with negative PF4). DC to SNF pending improvement in pain, skin, INR, and RBCEx.      # Waxing and waning AMS-- improved  - Felt to be due to opioids i/s/o worsening renal function and c/b respiratory acidosis  - All opioids on hold (9/28-10/2 resumed small dose oxy)  - Keep supplemental O2 on board, but titrate to maintain SPO2 of 90-92% at most  - On CPAP at home, enforce nightly use to extent possible; may need BiPAP if unresolved     # CHARLES on CKD II, improving  - Baseline ~ SCr <2, peak 5.4 this admission. 9/21 sCr 1.58 --> 1.13 (9/25)--> 1.14  (10/3)--> 0.98 (10/5)  - Likely pre-renal vs ATN given persistant hypotension over admission, on CKD II, now polyuric phase  - FeNA 0.4%  - Severe hyperkalemia with previous peaked T waves on EKG, resolved  - Nephrology following  - s/p multiple K cocktails in MICU  - Encourage increased PO intake     # Purpuric rash, suspected fixed drug eruption  # Concern for possible heparin-induced thrombocytopenia (HIT) -resolved  - Purple, reticular pattern of plaques noted around the bilateral breasts, scalp and groin,  with tense bullae. Non-erythematous, no purulence  - Some initial concern for possible heparin-induced thrombocytopenia with concurrent skin findings. Discontinued heparin 9/16 and transitioned to bivalirudin  - 4 T score 0, PF4 w/ reflex BINA negative. Hematology ultimately consulted, felt there was low concern for HIT and signed off  - Differential diagnosis includes purpura secondary to infection versus coagulopathy versus vasculopathy versus small and/or medium vessel vasculitis versus calciphylaxis vs drug rash  - Dermatology consulted, work-up to date:  - Low Protein C activity  - ANCA, PR3 and MPO negative  - ISABELLE neg  - cryoglobulin labs- not enough specimen to analyze  - S/p skin punch biopsies x 2 9/15 and one on 9/18, showed SUPERFICIAL EPIDERMAL DEGENERATION WITH ERYTHROCYTE EXTRAVASATION WITH NEUTROPHILS. These findings could be seen secondary to an older trauma, or resolving erythema multiforme, a fixed drug eruption, Edgar-Christopher syndrome, or toxic epidermal necrolysis. Favor Multifocal Fixed Drug Eruption    - Dermatology contacted (9/20) about results, recommended adding vancomycin to allergy list and starting triamcinolone  - Continue triamcinolone 0.1% cream BID (9/20-current) per derm recs  - Plan for left breat suture removal around 9/25-9/27 (per derm, sutures from flank were dissolvable)  - On 9/25, concern from pt and nursing that rash is not improving. Under breasts and groin still  causing pain. Bilateral upper thighs and forehead rash improving   - Wound care re-consulted 9/25, s/o 10/3 as derm gave recs below (wound pictures documented in WC note from 10/3)  - Re-engaged dermatology on 9/25 given worsening rash; recommended to apply vasoline to all eroded/denuded areas and continue triamcinolone cream to both the black plaques as well as the red areas across the trunk and thighs. Per dermatology, erythema is mild and not palpable, it is most likely part of the drug eruption the patient has previously been known to have     # Leukocytosis , improving  - Likely I/s/o rash vs reactive vs infectious process  - Leukocytosis noted on admission to MICU (WBC 24.8) --> 12.7 (9/25) --> 8.3 (10/3)  - Leukocytosis improved throughout hospital stay however started uptrending 9/20; now improving on 9/25   - Blood cultures 9/10, 9/12, 9/15 all NGTD  - Strep and Legionella Ag, MRSA nares negative  - CXR (9/21) largely unremarkable, again re-demonstrated perihilar prominence seen on prior XR  - Abdominal xray (9/21) unremarkable  - Repeat blood cultures x 2 (9/21) NGTD  - UA (9/21) with 500 LE  - UA (9/30) +bacteria, +leuks, urine culture (9/29) mult organisms  - UA sent 10/3 with +blood, leuks, and WBC- not reflex to cx so UCX added on to 10/3 UA and again showed multiple organisms  - Will hold off tx for UTI for now as pt asymptomatic     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  # ACS  - Previously managed through routine RBC exchanges q6 weeks, most recent RBCEx 3/1/24, per patient pausing on transfusions for time being  - OARRS reviewed, no aberrant behavior noted  - LDH>12,000 (now downtrending), haptoglobin <30, fibrinogen 318, retic % 5.2 on presentation, bili 10.7  - Leukocytosis 24.8 on admission to MICU  - CXR (9/10) persistent atelectasis/scarring in the left lower lung  - CT CAP (9/11) Diffuse mosaic attenuation of the lung parenchyma, which can be seen in the setting of small airway disease, pulmonary  edema or acute infection  - Intubated upon arrival to MICU, now s/p extubation 9/16  - Started on vanc and zosyn (finished both courses prior to floor transfer)  - Procal elevated 7.09 (9/11)  - Blood cultures 9/10, 9/12, 9/15 all NGTD  - Strep and Legionella Ag, MRSA nares negative  - Trop peak 1431, cards rec treat as ACS (see below)  - S/p 2u pRBCs 9/10 on arrival to MICU  - S/p exchange transfusion 9/11 AM for ACS  - Care plan from 12/6/23- For mild to moderate pain: Dilaudid 0.5mg IVP q3h as needed, for moderate to severe pain Dilaudid 1- 1.5mg q3h PRN  - On arrival to [facility], started on 0.6mg dilaudid IVP q3h PRN severe pain   - s/p 0.6mg dilaudid IVP q4h PRN breakthrough and 15mg PO oxycodone Q4 hrs severe pain (9/25)  - Per discussion with Dr. Whaley (9/25), ashok to increase PO oxycodone to 15-20mg Q4hr to optimize pain for discharge to SNF.   - On 9/26, discontinued IV dilaudid and increased to 20mg PO oxycodone q4hrs for severe pain   - Opioids on hold as of 9/28 d/t AMS- resumed 2.5mg oxy q4hrs PRN severe pain on 10/2. ER oxy 10mg BID still held  - 10/5 increased pain reg as AMS resolved. Started oxycodone 10mg q3hrs PRN severe pain  - Continue to hold ER Oxycontin indefinitely as was major contributor to AMS (was started in ICU and was not on this prior to admission)  - Bowel regimen for opioid induced constipation with Senna 2tabs BID, Miralax BID, and dulcolax suppository 10mg PRN constipation  - Zofran for opioid induced nausea and benadrly for opioid induced pruritus  - Continue home Folic Acid 1mg daily     # ST depression with tropinemia, suspected 2/2 demand ischemia iso ACS  # Hx SVT  - Elevated troponins on admission, peak 1431 and now downtrending   - EKG with ST depressions, likely Type II MI due to vaso-occlusive crisis and anemia  - TTE 9/11: EF 60-65%, RV enlarged and reduced in function which appears stable when compared to last TTE (1/2024)  - Troponin leak is likely due to cardiac demand vs  supply mismatch in the setting of worsening anemia and vaso-occlusive crisis  - Cardiology consulted, now signed off with indication to treat troponemia as ACS  - Lasix held in MICU 2/2 hypotension and CHARLES, can resume as able   - Metop 12.5mg BID held in MICU 2/2 hypotension- BP and HR continue to remain stable, holding off on restarting for now  - Follow-up with Cardiology outpt, FUV 2/13/25     # Acute liver injury, improving  # Direct hyperbilirubinuria, improving  - Suspected 2/2 hemodynamic instability versus sickle cell crisis  - On presentation to  MICU ALT 2611, AST 4727, T bili 10.2, INR 3.0; continues to improve  - CT with hepatic venous congestion, patient lacks gallbladder  - Liver US with Doppler: Diffuse fatty infiltration, unremarkable doppler interrogation of the liver  - EBV IgG positive, IgM negative  - CMV IgG positive, PCR negative  - Acute hepatitis panel unremarkable  - Hepatology initially following, now signed off  - Will continue to monitor, trend daily CMP     # pHTN   - Initial c/f CTEPH as imaging findings with dilated PA, filling defects, and mosaicism  - VQ scan (6/13/23) with non anatomical basal defects and RUL defect due to scar--> not favoring CTEPH per Dr. Yi and Dr. 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 On lifelong anticoagulation, DOAC discouraged due to high risk of clotting    - home Coumadin 5mg daily initially HELD on admission to MICU 2/2 unstable course  - restarted coumadin 9/18 PM, will plan to bridge with bival, Ok'd by heme   - 9/20 INR 3.8, bival and home warfarin held. Pharmacy rec repeat INR 4 hours after bival was held. If the INR < 2, we can restart the bival, but if the INR 2-3, we can continue with warfarin monotherapy   - 9/20 repeat INR 3.7, pharmacy rec continuing to hold both medications  - 9/21 INR 3.2, per pharmacy - get repeat INR in AM 9/22, if INR between 2-3 can restart home warfarin 5mg daily  -INR 2.6 (9/24) after restarting Warfarin 5mg, will maintain INR goal of 2-3  - INR 3.4 (9/25). Held warfarin on 9/25. Repeat INR 9/26. Plan to restart if <3   - INR 2.8 (9/26), restarted warfarin, recheck INR 9/27   - INR 4.0 (9/30), held warfarin, repeat INR 10/1 ordered, plan to restart if <3  - INR 6.8 (10/1), patient without any active bleeding, per attending and pharmacy, warfarin held   - INR 6.8 (10/2), patient without any active bleeding, per attending and pharmacy, warfarin held   - INR 6.7 (10/3), cont to hold warfarin. Ordered 5mg IV Vit K x1 (ok per pharmacy) d/t ?bleeding, vaginal vs wound excoriation and UA +blood  - 10/4 INR normalized to 1.6, continue to hold warfarin  - 10/5 INR 1.2; started heparin drip given normal INR while pending apheresis line placement (previous HIT workup with negative PF4)--> plan to hold 10/7 at 0400 for apheresis line placement  - 10/5 pt c/o severe LLE pain and swelling; duplex BLE negative  - Follows with Coumadin clinic outpatient  - Plan to consult vascular medicine if INR is not therapeutic on home regimen      # CAN  - Continue home CPAP and home Albuterol PRN     # H/O Cauda Equine syndrome  - Follows Dr. Delacruz as outpatient  - No current issues      DISPO:  - Full code- confirmed on admission  - NOK: Tati Salgado (mother) (#[phone])  - PT/OT rec SNF, plan for rehab at [facility]  - DC to SNF pending improvement  in pain, skin, INR, and RBCEx  - FUV Dr. Whaley 10/7 (requested reschedule), Cardiology 2/13

## 2024-10-07 ENCOUNTER — APPOINTMENT (OUTPATIENT)
Dept: RADIOLOGY | Facility: HOSPITAL | Age: 54
DRG: 811 | End: 2024-10-07
Payer: COMMERCIAL

## 2024-10-07 ENCOUNTER — APPOINTMENT (OUTPATIENT)
Dept: HEMATOLOGY/ONCOLOGY | Facility: HOSPITAL | Age: 54
End: 2024-10-07
Payer: COMMERCIAL

## 2024-10-07 LAB
ABO GROUP (TYPE) IN BLOOD: NORMAL
ALBUMIN SERPL BCP-MCNC: 2.7 G/DL (ref 3.4–5)
ALP SERPL-CCNC: 134 U/L (ref 33–110)
ALT SERPL W P-5'-P-CCNC: 19 U/L (ref 7–45)
ANION GAP SERPL CALC-SCNC: 11 MMOL/L (ref 10–20)
ANTIBODY SCREEN: NORMAL
APTT PPP: 35 SECONDS (ref 27–38)
AST SERPL W P-5'-P-CCNC: 37 U/L (ref 9–39)
BASOPHILS # BLD AUTO: 0.03 X10*3/UL (ref 0–0.1)
BASOPHILS NFR BLD AUTO: 0.3 %
BILIRUB SERPL-MCNC: 2.3 MG/DL (ref 0–1.2)
BUN SERPL-MCNC: 8 MG/DL (ref 6–23)
CA-I BLD-SCNC: 1.16 MMOL/L (ref 1.1–1.33)
CALCIUM SERPL-MCNC: 8.9 MG/DL (ref 8.6–10.6)
CHLORIDE SERPL-SCNC: 98 MMOL/L (ref 98–107)
CO2 SERPL-SCNC: 32 MMOL/L (ref 21–32)
CREAT SERPL-MCNC: 0.99 MG/DL (ref 0.5–1.05)
EGFRCR SERPLBLD CKD-EPI 2021: 67 ML/MIN/1.73M*2
EOSINOPHIL # BLD AUTO: 0.44 X10*3/UL (ref 0–0.7)
EOSINOPHIL NFR BLD AUTO: 4.1 %
ERYTHROCYTE [DISTWIDTH] IN BLOOD BY AUTOMATED COUNT: 18.8 % (ref 11.5–14.5)
GLUCOSE SERPL-MCNC: 61 MG/DL (ref 74–99)
HCT VFR BLD AUTO: 28.1 % (ref 36–46)
HGB BLD-MCNC: 9.3 G/DL (ref 12–16)
HGB RETIC QN: 28 PG (ref 28–38)
IMM GRANULOCYTES # BLD AUTO: 0.08 X10*3/UL (ref 0–0.7)
IMM GRANULOCYTES NFR BLD AUTO: 0.7 % (ref 0–0.9)
IMMATURE RETIC FRACTION: 16.9 %
INR PPP: 1.2 (ref 0.9–1.1)
LDH SERPL L TO P-CCNC: 306 U/L (ref 84–246)
LYMPHOCYTES # BLD AUTO: 1.89 X10*3/UL (ref 1.2–4.8)
LYMPHOCYTES NFR BLD AUTO: 17.5 %
MAGNESIUM SERPL-MCNC: 1.86 MG/DL (ref 1.6–2.4)
MCH RBC QN AUTO: 29.5 PG (ref 26–34)
MCHC RBC AUTO-ENTMCNC: 33.1 G/DL (ref 32–36)
MCV RBC AUTO: 89 FL (ref 80–100)
MONOCYTES # BLD AUTO: 1.37 X10*3/UL (ref 0.1–1)
MONOCYTES NFR BLD AUTO: 12.7 %
NEUTROPHILS # BLD AUTO: 6.98 X10*3/UL (ref 1.2–7.7)
NEUTROPHILS NFR BLD AUTO: 64.7 %
NRBC BLD-RTO: 9.7 /100 WBCS (ref 0–0)
PLATELET # BLD AUTO: 304 X10*3/UL (ref 150–450)
POTASSIUM SERPL-SCNC: 4 MMOL/L (ref 3.5–5.3)
PROT SERPL-MCNC: 6.3 G/DL (ref 6.4–8.2)
PROTHROMBIN TIME: 13.2 SECONDS (ref 9.8–12.8)
RBC # BLD AUTO: 3.15 X10*6/UL (ref 4–5.2)
RETICS #: 0.37 X10*6/UL (ref 0.02–0.08)
RETICS/RBC NFR AUTO: 11.8 % (ref 0.5–2)
RH FACTOR (ANTIGEN D): NORMAL
SODIUM SERPL-SCNC: 137 MMOL/L (ref 136–145)
UFH PPP CHRO-ACNC: 0.2 IU/ML
UFH PPP CHRO-ACNC: 0.7 IU/ML
WBC # BLD AUTO: 10.8 X10*3/UL (ref 4.4–11.3)

## 2024-10-07 PROCEDURE — 99233 SBSQ HOSP IP/OBS HIGH 50: CPT | Performed by: NURSE PRACTITIONER

## 2024-10-07 PROCEDURE — 85660 RBC SICKLE CELL TEST: CPT

## 2024-10-07 PROCEDURE — 7100000010 HC PHASE TWO TIME - EACH INCREMENTAL 1 MINUTE

## 2024-10-07 PROCEDURE — 76937 US GUIDE VASCULAR ACCESS: CPT | Performed by: STUDENT IN AN ORGANIZED HEALTH CARE EDUCATION/TRAINING PROGRAM

## 2024-10-07 PROCEDURE — C1894 INTRO/SHEATH, NON-LASER: HCPCS

## 2024-10-07 PROCEDURE — 2720000007 HC OR 272 NO HCPCS

## 2024-10-07 PROCEDURE — 2500000004 HC RX 250 GENERAL PHARMACY W/ HCPCS (ALT 636 FOR OP/ED): Performed by: NURSE PRACTITIONER

## 2024-10-07 PROCEDURE — 85520 HEPARIN ASSAY: CPT | Performed by: NURSE PRACTITIONER

## 2024-10-07 PROCEDURE — 99152 MOD SED SAME PHYS/QHP 5/>YRS: CPT

## 2024-10-07 PROCEDURE — 83021 HEMOGLOBIN CHROMOTOGRAPHY: CPT | Performed by: NURSE PRACTITIONER

## 2024-10-07 PROCEDURE — 86902 BLOOD TYPE ANTIGEN DONOR EA: CPT

## 2024-10-07 PROCEDURE — 83615 LACTATE (LD) (LDH) ENZYME: CPT

## 2024-10-07 PROCEDURE — 85045 AUTOMATED RETICULOCYTE COUNT: CPT

## 2024-10-07 PROCEDURE — 2500000001 HC RX 250 WO HCPCS SELF ADMINISTERED DRUGS (ALT 637 FOR MEDICARE OP)

## 2024-10-07 PROCEDURE — 85730 THROMBOPLASTIN TIME PARTIAL: CPT

## 2024-10-07 PROCEDURE — C1769 GUIDE WIRE: HCPCS

## 2024-10-07 PROCEDURE — 83020 HEMOGLOBIN ELECTROPHORESIS: CPT | Performed by: NURSE PRACTITIONER

## 2024-10-07 PROCEDURE — 80053 COMPREHEN METABOLIC PANEL: CPT | Performed by: NURSE PRACTITIONER

## 2024-10-07 PROCEDURE — 36556 INSERT NON-TUNNEL CV CATH: CPT | Performed by: STUDENT IN AN ORGANIZED HEALTH CARE EDUCATION/TRAINING PROGRAM

## 2024-10-07 PROCEDURE — 86900 BLOOD TYPING SEROLOGIC ABO: CPT | Performed by: NURSE PRACTITIONER

## 2024-10-07 PROCEDURE — 82330 ASSAY OF CALCIUM: CPT | Performed by: NURSE PRACTITIONER

## 2024-10-07 PROCEDURE — 02HV33Z INSERTION OF INFUSION DEVICE INTO SUPERIOR VENA CAVA, PERCUTANEOUS APPROACH: ICD-10-PCS | Performed by: STUDENT IN AN ORGANIZED HEALTH CARE EDUCATION/TRAINING PROGRAM

## 2024-10-07 PROCEDURE — 97110 THERAPEUTIC EXERCISES: CPT | Mod: GP

## 2024-10-07 PROCEDURE — 36415 COLL VENOUS BLD VENIPUNCTURE: CPT | Performed by: NURSE PRACTITIONER

## 2024-10-07 PROCEDURE — 1170000001 HC PRIVATE ONCOLOGY ROOM DAILY

## 2024-10-07 PROCEDURE — 2780000003 HC OR 278 NO HCPCS

## 2024-10-07 PROCEDURE — 97110 THERAPEUTIC EXERCISES: CPT | Mod: GO

## 2024-10-07 PROCEDURE — 36415 COLL VENOUS BLD VENIPUNCTURE: CPT

## 2024-10-07 PROCEDURE — 85025 COMPLETE CBC W/AUTO DIFF WBC: CPT | Performed by: NURSE PRACTITIONER

## 2024-10-07 PROCEDURE — 86922 COMPATIBILITY TEST ANTIGLOB: CPT

## 2024-10-07 PROCEDURE — C1752 CATH,HEMODIALYSIS,SHORT-TERM: HCPCS

## 2024-10-07 PROCEDURE — 7100000009 HC PHASE TWO TIME - INITIAL BASE CHARGE

## 2024-10-07 PROCEDURE — 83735 ASSAY OF MAGNESIUM: CPT | Performed by: NURSE PRACTITIONER

## 2024-10-07 PROCEDURE — 36410 VNPNXR 3YR/> PHY/QHP DX/THER: CPT | Performed by: STUDENT IN AN ORGANIZED HEALTH CARE EDUCATION/TRAINING PROGRAM

## 2024-10-07 PROCEDURE — 2500000002 HC RX 250 W HCPCS SELF ADMINISTERED DRUGS (ALT 637 FOR MEDICARE OP, ALT 636 FOR OP/ED): Performed by: NURSE PRACTITIONER

## 2024-10-07 PROCEDURE — 2500000004 HC RX 250 GENERAL PHARMACY W/ HCPCS (ALT 636 FOR OP/ED): Performed by: STUDENT IN AN ORGANIZED HEALTH CARE EDUCATION/TRAINING PROGRAM

## 2024-10-07 PROCEDURE — 85610 PROTHROMBIN TIME: CPT

## 2024-10-07 PROCEDURE — 2500000001 HC RX 250 WO HCPCS SELF ADMINISTERED DRUGS (ALT 637 FOR MEDICARE OP): Performed by: NURSE PRACTITIONER

## 2024-10-07 RX ORDER — ENOXAPARIN SODIUM 150 MG/ML
1 INJECTION SUBCUTANEOUS EVERY 12 HOURS
Status: DISCONTINUED | OUTPATIENT
Start: 2024-10-07 | End: 2024-10-12

## 2024-10-07 RX ORDER — FENTANYL CITRATE 50 UG/ML
INJECTION, SOLUTION INTRAMUSCULAR; INTRAVENOUS
Status: COMPLETED | OUTPATIENT
Start: 2024-10-07 | End: 2024-10-07

## 2024-10-07 RX ORDER — MIDAZOLAM HYDROCHLORIDE 1 MG/ML
INJECTION INTRAMUSCULAR; INTRAVENOUS
Status: COMPLETED | OUTPATIENT
Start: 2024-10-07 | End: 2024-10-07

## 2024-10-07 RX ORDER — DIPHENHYDRAMINE HCL 25 MG
25 CAPSULE ORAL ONCE
Status: COMPLETED | OUTPATIENT
Start: 2024-10-07 | End: 2024-10-07

## 2024-10-07 RX ORDER — PANTOPRAZOLE SODIUM 40 MG/1
40 TABLET, DELAYED RELEASE ORAL
Status: DISCONTINUED | OUTPATIENT
Start: 2024-10-08 | End: 2024-10-21 | Stop reason: HOSPADM

## 2024-10-07 ASSESSMENT — COGNITIVE AND FUNCTIONAL STATUS - GENERAL
TOILETING: A LITTLE
DRESSING REGULAR LOWER BODY CLOTHING: TOTAL
DRESSING REGULAR UPPER BODY CLOTHING: A LITTLE
MOBILITY SCORE: 18
STANDING UP FROM CHAIR USING ARMS: A LOT
HELP NEEDED FOR BATHING: A LITTLE
HELP NEEDED FOR BATHING: A LITTLE
DRESSING REGULAR UPPER BODY CLOTHING: A LITTLE
TOILETING: A LITTLE
MOBILITY SCORE: 14
EATING MEALS: A LITTLE
HELP NEEDED FOR BATHING: A LOT
TURNING FROM BACK TO SIDE WHILE IN FLAT BAD: A LITTLE
WALKING IN HOSPITAL ROOM: A LOT
MOVING TO AND FROM BED TO CHAIR: A LOT
MOVING TO AND FROM BED TO CHAIR: A LITTLE
TOILETING: TOTAL
CLIMB 3 TO 5 STEPS WITH RAILING: A LOT
CLIMB 3 TO 5 STEPS WITH RAILING: TOTAL
MOVING FROM LYING ON BACK TO SITTING ON SIDE OF FLAT BED WITH BEDRAILS: A LITTLE
MOVING TO AND FROM BED TO CHAIR: A LITTLE
STANDING UP FROM CHAIR USING ARMS: A LITTLE
DAILY ACTIVITIY SCORE: 19
DAILY ACTIVITIY SCORE: 12
MOBILITY SCORE: 14
PERSONAL GROOMING: A LITTLE
WALKING IN HOSPITAL ROOM: A LOT
CLIMB 3 TO 5 STEPS WITH RAILING: TOTAL
DRESSING REGULAR LOWER BODY CLOTHING: A LITTLE
DRESSING REGULAR UPPER BODY CLOTHING: A LOT
DAILY ACTIVITIY SCORE: 20
TURNING FROM BACK TO SIDE WHILE IN FLAT BAD: A LITTLE
DRESSING REGULAR LOWER BODY CLOTHING: A LOT
WALKING IN HOSPITAL ROOM: TOTAL
STANDING UP FROM CHAIR USING ARMS: A LITTLE

## 2024-10-07 ASSESSMENT — PAIN SCALES - GENERAL
PAINLEVEL_OUTOF10: 8
PAINLEVEL_OUTOF10: 6
PAINLEVEL_OUTOF10: 8
PAINLEVEL_OUTOF10: 0 - NO PAIN
PAINLEVEL_OUTOF10: 0 - NO PAIN
PAINLEVEL_OUTOF10: 9
PAINLEVEL_OUTOF10: 9
PAINLEVEL_OUTOF10: 0 - NO PAIN
PAINLEVEL_OUTOF10: 8
PAINLEVEL_OUTOF10: 7
PAINLEVEL_OUTOF10: 0 - NO PAIN
PAINLEVEL_OUTOF10: 7
PAINLEVEL_OUTOF10: 0 - NO PAIN
PAINLEVEL_OUTOF10: 8

## 2024-10-07 ASSESSMENT — PAIN - FUNCTIONAL ASSESSMENT

## 2024-10-07 ASSESSMENT — PAIN DESCRIPTION - DESCRIPTORS
DESCRIPTORS: THROBBING;ACHING
DESCRIPTORS: ACHING

## 2024-10-07 NOTE — POST-PROCEDURE NOTE
Interventional Radiology Post-Procedure Note    Attending: Harjinder Swan MD    Assistant:   Julio Edgar DO    Diagnosis:   1. Acute liver failure without hepatic coma (HHS-HCC)  Vascular US abdomen/pelvis duplex complete    Vascular US abdomen/pelvis duplex complete      2. Sickle cell crisis (Multi)        3. Acute on chronic respiratory failure with hypoxia (Multi)  Transthoracic Echo (TTE) Limited    Transthoracic Echo (TTE) Limited      4. Troponin level elevated  CANCELED: Referral to Cardiology      5. NSTEMI (non-ST elevated myocardial infarction) (Multi)  Transthoracic Echo (TTE) Complete    Transthoracic Echo (TTE) Complete    CANCELED: Referral to Cardiology      6. Rash and other nonspecific skin eruption  Biopsy    Biopsy      7. Sickle cell anemia with pain (Multi)  CANCELED: Referral to Home Health      8. Deep vein thrombosis (DVT) of distal vein of lower extremity, unspecified chronicity, unspecified laterality (Multi)  Protime-INR      9. Personal history of other venous thrombosis and embolism  Protime-INR      10. Warfarin anticoagulation  Protime-INR      11. Drug eruption, fixed  triamcinolone (Kenalog) 0.1 % cream    white petrolatum (Aquaphor) 41 % ointment ointment      12. Localized edema  Lower extremity venous duplex bilateral    Lower extremity venous duplex bilateral      13. Sickle cell anemia without crisis (Multi)  Referral to Sickle Cell Disease Program           Description of procedure:     Central Line Placement  Side: right    A time out was preformed identifying the correct procedure, the correct location with the nursing staff.  The right groin was prepped with 2% chlorhexidine and draped with a full length sterile sheet in the usual fashion.  1% lidocaine was administered subcutaneously for local anesthesia.  The right femoral vein was accessed under ultrasound guidance with an 18 gauge needle.  A trialysis catheter was inserted via the seldinger technique.  Dark,  nonpulsatile blood was withdrawn flushed easily with normal saline.  The catheter was sutured in place and a sterile dressing was applied over the site prior to removal of drapes.      The patient tolerated the procedure well and there were no complications. Chest x ray is pending.       Anesthesia:  Local    Complications: None    Estimated Blood Loss: minimal          See detailed result report with images in PACS.    The patient tolerated the procedure well without incident or complication and is in stable condition.     Julio Edgar, DO  Interventional Radiology  Nursing Quarterback: [id number], IR pager: [phone]     NON-Urgent on call weekends and after hours weekdays (5pm - 5am) IR pager: [phone]  Urgent & emergent on call weekends and after hours weekdays (5pm-7am) IR pager: [phone]

## 2024-10-07 NOTE — CARE PLAN
Patient will remain HDS and VSS by 10/7/24 at 0700      Problem: Pain - Adult  Goal: Verbalizes/displays adequate comfort level or baseline comfort level  10/7/2024 0552 by Olga Menendez RN  Outcome: Progressing  10/7/2024 0552 by Olga Menendez RN  Outcome: Progressing  10/7/2024 0539 by Olga Menendez RN  Outcome: Progressing     Problem: Safety - Adult  Goal: Free from fall injury  10/7/2024 0552 by Olga Menendez RN  Outcome: Progressing  10/7/2024 0552 by Olga Menendez RN  Outcome: Progressing  10/7/2024 0539 by Olga Menendez RN  Outcome: Progressing     Problem: Skin  Goal: Decreased wound size/increased tissue granulation at next dressing change  10/7/2024 0552 by Olga Menendez RN  Outcome: Progressing  Flowsheets (Taken 10/7/2024 0552)  Decreased wound size/increased tissue granulation at next dressing change: Promote sleep for wound healing  10/7/2024 0552 by Olga Menendez RN  Outcome: Progressing  Flowsheets  Taken 10/7/2024 0552 by Olga Menendez RN  Decreased wound size/increased tissue granulation at next dressing change: Promote sleep for wound healing  Taken 10/6/2024 0154 by Gordon Munoz RN  Decreased wound size/increased tissue granulation at next dressing change: Promote sleep for wound healing  10/7/2024 0539 by Olga Menendez RN  Outcome: Progressing  Flowsheets (Taken 10/6/2024 0154 by Gordon Munoz RN)  Decreased wound size/increased tissue granulation at next dressing change: Promote sleep for wound healing  Goal: Participates in plan/prevention/treatment measures  10/7/2024 0552 by Olga Menendez RN  Outcome: Progressing  Flowsheets (Taken 10/7/2024 0552)  Participates in plan/prevention/treatment measures: Elevate heels  10/7/2024 0552 by Olga Menendez RN  Outcome: Progressing  Flowsheets  Taken 10/7/2024 0552  Participates in plan/prevention/treatment measures: Elevate heels  Taken 10/7/2024 0541  Participates in plan/prevention/treatment measures:  Elevate heels  10/7/2024 0539 by Olga Menendez RN  Outcome: Progressing  Flowsheets (Taken 10/7/2024 0539)  Participates in plan/prevention/treatment measures: Elevate heels  Goal: Prevent/manage excess moisture  10/7/2024 0552 by Olga Menendez RN  Outcome: Progressing  Flowsheets (Taken 10/7/2024 0539)  Prevent/manage excess moisture: Follow provider orders for dressing changes  10/7/2024 0552 by Olga Menendez RN  Outcome: Progressing  Flowsheets (Taken 10/7/2024 0539)  Prevent/manage excess moisture: Follow provider orders for dressing changes  10/7/2024 0539 by Olga Menendez RN  Outcome: Progressing  Flowsheets (Taken 10/7/2024 0539)  Prevent/manage excess moisture: Follow provider orders for dressing changes  Goal: Prevent/minimize sheer/friction injuries  10/7/2024 0552 by Olga Menendez RN  Outcome: Progressing  Flowsheets (Taken 10/7/2024 0541)  Prevent/minimize sheer/friction injuries: HOB 30 degrees or less  10/7/2024 0552 by Olga Menendez RN  Outcome: Progressing  Flowsheets (Taken 10/7/2024 0541)  Prevent/minimize sheer/friction injuries: HOB 30 degrees or less  10/7/2024 0539 by Olga Menendez RN  Outcome: Progressing  Flowsheets (Taken 10/7/2024 0539)  Prevent/minimize sheer/friction injuries: HOB 30 degrees or less  Goal: Promote/optimize nutrition  10/7/2024 0552 by Olga Menendez RN  Outcome: Progressing  Flowsheets (Taken 10/7/2024 0541)  Promote/optimize nutrition: Offer water/supplements/favorite foods  10/7/2024 0552 by Olga Menendez RN  Outcome: Progressing  Flowsheets (Taken 10/7/2024 0541)  Promote/optimize nutrition: Offer water/supplements/favorite foods  10/7/2024 0539 by Olga Menendez RN  Outcome: Progressing  Flowsheets (Taken 10/7/2024 0539)  Promote/optimize nutrition: Offer water/supplements/favorite foods  Goal: Promote skin healing  10/7/2024 0552 by Olga Menendez RN  Outcome: Progressing  Flowsheets (Taken 10/7/2024 0539)  Promote skin healing: Assess  skin/pad under line(s)/device(s)  10/7/2024 0552 by Olag Menendez RN  Outcome: Progressing  Flowsheets (Taken 10/7/2024 0539)  Promote skin healing: Assess skin/pad under line(s)/device(s)  10/7/2024 0539 by Olga Menendez RN  Outcome: Progressing  Flowsheets (Taken 10/7/2024 0539)  Promote skin healing: Assess skin/pad under line(s)/device(s)     Problem: Knowledge Deficit  Goal: Patient/family/caregiver demonstrates understanding of disease process, treatment plan, medications, and discharge instructions  10/7/2024 0552 by Olga Menendez RN  Outcome: Progressing  10/7/2024 0552 by Olga Menendez RN  Outcome: Progressing  10/7/2024 0539 by Olga Menendez RN  Outcome: Progressing     Problem: Pain  Goal: Takes deep breaths with improved pain control throughout the shift  10/7/2024 0552 by Olga Menendez RN  Outcome: Progressing  10/7/2024 0552 by Olga Menendez RN  Outcome: Progressing  10/7/2024 0539 by Olga Menendez RN  Outcome: Progressing  Goal: Turns in bed with improved pain control throughout the shift  10/7/2024 0552 by Olga Menendez RN  Outcome: Progressing  10/7/2024 0552 by Olga Menendez RN  Outcome: Progressing  10/7/2024 0539 by Olga Menendez RN  Outcome: Progressing  Goal: Walks with improved pain control throughout the shift  10/7/2024 0552 by Olga Menendez RN  Outcome: Progressing  10/7/2024 0552 by Olga Menendez RN  Outcome: Progressing  10/7/2024 0539 by Olga Menendez RN  Outcome: Progressing  Goal: Performs ADL's with improved pain control throughout shift  10/7/2024 0552 by Olga Menendez RN  Outcome: Progressing  10/7/2024 0552 by Olga Menendez RN  Outcome: Progressing  10/7/2024 0539 by Olga Menendez RN  Outcome: Progressing  Goal: Participates in PT with improved pain control throughout the shift  10/7/2024 0552 by Olga K Menendez, RN  Outcome: Progressing  10/7/2024 0552 by Olga Menendez RN  Outcome: Progressing  10/7/2024 0539 by Olga LEVY  JERICA Menendez  Outcome: Progressing  Goal: Free from opioid side effects throughout the shift  10/7/2024 0552 by Olga Menendez RN  Outcome: Progressing  10/7/2024 0552 by Olga Menendez RN  Outcome: Progressing  10/7/2024 0539 by Olga Menendez RN  Outcome: Progressing  Goal: Free from acute confusion related to pain meds throughout the shift  10/7/2024 0552 by Olga Menendez RN  Outcome: Progressing  10/7/2024 0552 by Olga Menendez RN  Outcome: Progressing  10/7/2024 0539 by Olga Menendez RN  Outcome: Progressing     Problem: Nutrition  Goal: Less than 5 days NPO/clear liquids  10/7/2024 0552 by Olga Menendez RN  Outcome: Progressing  10/7/2024 0552 by Olga Menendez RN  Outcome: Progressing  10/7/2024 0539 by Olga Menendez RN  Outcome: Progressing  Goal: Oral intake greater than 50%  10/7/2024 0552 by Olga Menendez RN  Outcome: Progressing  10/7/2024 0552 by Olga Menendez RN  Outcome: Progressing  10/7/2024 0539 by Olga Menendez RN  Outcome: Progressing  Goal: Oral intake greater 75%  10/7/2024 0552 by Olga Menendez RN  Outcome: Progressing  10/7/2024 0552 by Olga Menendez RN  Outcome: Progressing  10/7/2024 0539 by Olga Menendez RN  Outcome: Progressing  Goal: Consume prescribed supplement  10/7/2024 0552 by Olga Menendez RN  Outcome: Progressing  10/7/2024 0552 by Olga Menendez RN  Outcome: Progressing  10/7/2024 0539 by Olga Menendez RN  Outcome: Progressing  Goal: Adequate PO fluid intake  10/7/2024 0552 by Olga Menendez RN  Outcome: Progressing  10/7/2024 0552 by Olga Menendez RN  Outcome: Progressing  10/7/2024 0539 by Olga Menendez RN  Outcome: Progressing  Goal: Nutrition support goals are met within 48 hrs  10/7/2024 0552 by Olga Menendez RN  Outcome: Progressing  10/7/2024 0552 by Olga Menendez, RN  Outcome: Progressing  10/7/2024 0539 by Olga Menendez, RN  Outcome: Progressing  Goal: Nutrition support is meeting 75% of nutrient  needs  10/7/2024 0552 by Olga Menendez RN  Outcome: Progressing  10/7/2024 0552 by Olga Menendez RN  Outcome: Progressing  10/7/2024 0539 by Olga Menendez RN  Outcome: Progressing  Goal: BG  mg/dL  10/7/2024 0552 by Olga Menendez RN  Outcome: Progressing  10/7/2024 0552 by Olga Menendez, RN  Outcome: Progressing  10/7/2024 0539 by Olga Menendez RN  Outcome: Progressing  Goal: Lab values WNL  10/7/2024 0552 by Olga Menendez RN  Outcome: Progressing  10/7/2024 0552 by Olga Menendez RN  Outcome: Progressing  10/7/2024 0539 by Olga Menendez RN  Outcome: Progressing  Goal: Electrolytes WNL  10/7/2024 0552 by Olga Menendez, RN  Outcome: Progressing  10/7/2024 0552 by Olga Menendez, JERICA  Outcome: Progressing  10/7/2024 0539 by Olga Menendez RN  Outcome: Progressing  Goal: Promote healing  10/7/2024 0552 by Olga Menendez RN  Outcome: Progressing  10/7/2024 0552 by Olga Menendez RN  Outcome: Progressing  10/7/2024 0539 by Olga Menendez RN  Outcome: Progressing  Goal: Maintain stable weight  10/7/2024 0552 by Olga Menendez, JERICA  Outcome: Progressing  10/7/2024 0552 by Olga Menendez, RN  Outcome: Progressing  10/7/2024 0539 by Olga Menendez RN  Outcome: Progressing  Goal: Reduce weight from edema/fluid  10/7/2024 0552 by Olga Menendez RN  Outcome: Progressing  10/7/2024 0552 by Olga Menendez RN  Outcome: Progressing  10/7/2024 0539 by Olga Menendez RN  Outcome: Progressing  Goal: Gradual weight gain  10/7/2024 0552 by Olga Menendez, RN  Outcome: Progressing  10/7/2024 0552 by Olga Menendez, RN  Outcome: Progressing  10/7/2024 0539 by Olga Menendez RN  Outcome: Progressing    Problem: Fall/Injury  Goal: Not fall by end of shift  10/7/2024 0552 by Olga Menendez RN  Outcome: Progressing  10/7/2024 0552 by Olga Menendez RN  Outcome: Progressing  10/7/2024 0539 by Olga Menendez RN  Outcome: Progressing  Goal: Be free from injury by end of the  shift  10/7/2024 0552 by Olga Menendez RN  Outcome: Progressing  10/7/2024 0552 by Olga Menendez RN  Outcome: Progressing  10/7/2024 0539 by Olga Menendez RN  Outcome: Progressing  Goal: Verbalize understanding of personal risk factors for fall in the hospital  10/7/2024 0552 by Olga Menendez RN  Outcome: Progressing  10/7/2024 0552 by Olga Menendez RN  Outcome: Progressing  10/7/2024 0539 by Olga Menendez RN  Outcome: Progressing  Goal: Verbalize understanding of risk factor reduction measures to prevent injury from fall in the home  10/7/2024 0552 by Olga Menendez RN  Outcome: Progressing  10/7/2024 0552 by Olga Menendez RN  Outcome: Progressing  10/7/2024 0539 by Olga Menendez RN  Outcome: Progressing  Goal: Use assistive devices by end of the shift  10/7/2024 0552 by Olga Menendez RN  Outcome: Progressing  10/7/2024 0552 by Olga Menendez RN  Outcome: Progressing  10/7/2024 0539 by Olga Menendez RN  Outcome: Progressing  Goal: Pace activities to prevent fatigue by end of the shift  10/7/2024 0552 by Olga Menendez RN  Outcome: Progressing  10/7/2024 0552 by Olga Menendez RN  Outcome: Progressing  10/7/2024 0539 by Olga Menendez, RN  Outcome: Progressing

## 2024-10-07 NOTE — SIGNIFICANT EVENT
Rapid Response Nurse Note: [] Rapid Response [x] RADAR alert: Score 7    Pager time:   Arrival time:   Event end time:   Location: [place] [address]  [x] Phone triage     Rapid response initiated by:  [] Rapid Response RN [] Family [] Nursing Supervisor [] Physician   [x] RADAR auto-page [] Sepsis auto-page [] RN [] RT   [] NP/PA [] Other:     Primary reason for call:   [] BAT [] New CPAP/BiPAP [] Bleeding [] Change in mental status   [] Chest pain [] Code blue [] FiO2 >/= 50% [] HR </= 40 bpm   [] HR >/= 130 bpm [] Hyperglycemia [] Hypoglycemia [x] RADAR    [] RR </= 8 bpm [] RR >/= 30 bpm [] SBP </= 90 mmHg [] SpO2 < 90%   [] Seizure [] Sepsis [] Staff concern:     Initial VS and/or RADAR VS: T 37 °C; HR 97; RR 16; /69; SPO2 84%.    Interventions:  [x] None [] ABG [] Assist w/ICU transfer [] BAT paged    [] Bag mask [] Blood [] Cardioversion [] Code Blue   [] Code blue for intubation [] Code status changed [] Chest x-ray [] EKG   [] IV fluid/bolus [] KUB x-ray [] Labs/cultures [] Medication   [] Nebulizer treatment [] NIPPV (CPAP/BiPAP) [] Oxygen [] Oral airway   [] Peripheral IV [] Palliative care consult [] CT/MRI [] Sepsis protocol    [] Suctioned [] Other:     Name of ICU Provider contacted (if applicable):   Outcome:  [] Coded and  [] Code blue for intubation [] Coded and transferred to ICU []  on division   [x] Remained on division (no change) [] Remained on division + additional monitoring [] Remained in ED [] Transferred to ED   [] Transferred to ICU [] Transferred to inpatient status [] Transferred for interventions (procedure) [] Transferred to ICU stepdown    [] Transferred to surgery [] Transferred to telemetry [] Sepsis protocol [] STEMI protocol   [] Stroke protocol [x] Bedside nurse instructed to page rapid response for any concerns or acute change in condition/VS     Additional Comments: RADAR auto page received for above vitals.  Pulse ox recheck at that time by PT = 95%  on 2LNC. Bedside RN aware and states no acute concerns or changes at this time.  Please page rapid response for any assistance needed or concerns for deterioration.

## 2024-10-07 NOTE — PROGRESS NOTES
Occupational Therapy    Occupational Therapy Treatment    Name: Senait Narvaez  MRN: [id number]  : 1970  Date: 10/07/24  Room: [address] [place], [place] [address]      Time Calculation  Start Time: 1255  Stop Time: 1330  Time Calculation (min): 35 min    Assessment:  OT Assessment: Patient receptive to UE HEP established with handout provided for increased carryover. Continue MOD intensity OT rec.  Prognosis: Good  Evaluation/Treatment Tolerance: Patient limited by pain (agreeable to in bed activity)  End of Session Communication: Bedside nurse  End of Session Patient Position: Bed, 3 rail up  Plan:  Treatment Interventions: ADL retraining, Visual perceptual retraining, Functional transfer training, UE strengthening/ROM, Endurance training, Cognitive reorientation, Patient/family training, Neuromuscular reeducation, Fine motor coordination activities, UE splinting, Equipment evaluation/education, Compensatory technique education  OT Frequency: 3 times per week  OT Discharge Recommendations: Moderate intensity level of continued care (hopeful progression to LOW)  Equipment Recommended upon Discharge: Wheeled walker  OT Recommended Transfer Status: Assist of 1  OT - OK to Discharge: Yes (when medically appropriate)    Subjective   General:  OT Last Visit  OT Received On: 10/07/24  Prior to Session Communication: Bedside nurse  Patient Position Received: Bed, 3 rail up  Family/Caregiver Present: Yes  Caregiver Feedback: supportive mother present for part of session.  General Comment: Agreeable to OT session; pt requestion OOB later this date due to LE pain from catheter placement in R groin. Agreeable to in bed activity.   Precautions:  Hearing/Visual Limitations: appears WFL; glasses on.  Medical Precautions: Fall precautions      Lines/Tubes/Drains:  External Urinary Catheter Female (Active)   Number of days: 0       Hemodialysis Cath 10/07/24 Triple lumen Right Non-tunneled catheter (Active)   Number of days: 0       Cognition:  Overall  Cognitive Status: Within Functional Limits  Orientation Level: Oriented X4  Following Commands: Follows one step commands without difficulty  Processing Speed: Within funtional limits    Pain Assessment:  Pain Assessment  Pain Assessment: 0-10  0-10 (Numeric) Pain Score: 9  Pain Type: Acute pain  Pain Location: Leg  Pain Orientation: Right, Left (Left worse overall; Right pain at new catheter site)  Pain Descriptors: Aching  Pain Frequency: Constant/continuous  Pain Onset: Ongoing  Pain Interventions: Repositioned, Distraction, Emotional support  Response to Interventions: tolerated OT     Objective       Functional Standing Tolerance:  Functional Mobility  Functional Mobility Performed: No  Bed Mobility/Transfers:   Bed Mobility  Bed Mobility: No (Patient declined 2/2 pain.)    Transfers  Transfer: No    Therapy/Activity:   Therapeutic Exercise  Therapeutic Exercise Performed: Yes  Therapeutic Exercise Activity 1: Faciliation of bed level exercises to increase activity tolerance and strength. Ankle pumps x 10 with prolonged stretch, elbow flex/ext x 15 reps, shoulder flex/ext x 15 reps, shoulder rolls x 15 reps, forward reaching x [id number] sets. Rest breaks as needed and VSS. Handout provided for increased carryover.  Therapeutic Activity  Therapeutic Activity Performed: Yes  Therapeutic Activity 1: Repositioned with pillow to elevate BLE as pt with some swelling; pt reporting increased comfort.    Cognitive Skill Development:  Cognitive Skill Development  Cognitive Skill Development Activity 1: Active listening regarding coping with CLOF.    Outcome Measures:  [facility] Daily Activity  Putting on and taking off regular lower body clothing: Total  Bathing (including washing, rinsing, drying): A lot  Putting on and taking off regular upper body clothing: A lot  Toileting, which includes using toilet, bedpan or urinal: Total  Taking care of personal grooming such as brushing teeth: A little  Eating Meals: A little  Daily  Activity - Total Score: 12     Education Documentation  Home Exercise Program, taught by Kimberly Brewer OT at 10/7/2024  1:56 PM.  Learner: Family, Patient  Readiness: Acceptance  Method: Explanation  Response: Verbalizes Understanding  Comment: UE HEP and safety parameters    Handouts, taught by Kimberly Brewer OT at 10/7/2024  1:56 PM.  Learner: Family, Patient  Readiness: Acceptance  Method: Explanation  Response: Verbalizes Understanding  Comment: UE HEP and safety parameters    Body Mechanics, taught by Kimberly Brewer OT at 10/7/2024  1:56 PM.  Learner: Family, Patient  Readiness: Acceptance  Method: Explanation  Response: Verbalizes Understanding  Comment: UE HEP and safety parameters    Precautions, taught by Kimberly Brewer OT at 10/7/2024  1:56 PM.  Learner: Family, Patient  Readiness: Acceptance  Method: Explanation  Response: Verbalizes Understanding  Comment: UE HEP and safety parameters    ADL Training, taught by Kimberly Brewer OT at 10/7/2024  1:56 PM.  Learner: Family, Patient  Readiness: Acceptance  Method: Explanation  Response: Verbalizes Understanding  Comment: UE HEP and safety parameters    Education Comments  No comments found.        Goals:  Encounter Problems       Encounter Problems (Active)       ADLs       Patient will complete toileting with SBA and min cues.   (Progressing)       Start:  09/17/24    Expected End:  10/21/24            Patient will complete LB dressing with SBA and min cues.   (Progressing)       Start:  09/17/24    Expected End:  10/21/24            Patient will complete UB dressing with set up assist.   (Progressing)       Start:  09/17/24    Expected End:  10/21/24            Patient will complete grooming with set up assist.   (Progressing)       Start:  09/17/24    Expected End:  10/21/24                   BALANCE       Patient will demo standing balance for at least 5 min with SBA in prep for standing ADLs. (Not Progressing)       Start:  09/17/24     Expected End:  10/21/24               COGNITION/SAFETY       Patient will score WNL on cognitive assessment. (Progressing)       Start:  09/17/24    Expected End:  10/21/24               EXERCISE/STRENGTHENING       Patient will demo UE HEP with MOD I.  (Progressing)       Start:  10/07/24    Expected End:  10/21/24               MOBILITY       Patient will complete bed mobility with SBA and min cues.    (Not Progressing)       Start:  09/17/24    Expected End:  10/21/24            Pt. Will demo short household distance functional mobility with SBA using LRAD.   (Not Progressing)       Start:  09/17/24    Expected End:  10/21/24               TRANSFERS       Pt. Will complete stand pivot transfer with SBA assist with min cues and using LRAD.   (Not Progressing)       Start:  09/17/24    Expected End:  10/21/24                     10/07/24 at 1:57 PM   Kimberly Brewer, OT   [phone]

## 2024-10-07 NOTE — PROGRESS NOTES
Physical Therapy    Physical Therapy Treatment    Patient Name: Senait Narvaez  MRN: [id number]  Department: [facility]  Room: [facility]4023-A  Today's Date: 10/7/2024  Time Calculation  Start Time: 1547  Stop Time: 1610  Time Calculation (min): 23 min         Assessment/Plan   PT Assessment  PT Assessment Results: Decreased strength, Decreased endurance, Impaired balance, Decreased mobility, Pain  Rehab Prognosis: Good  Barriers to Discharge: none  Evaluation/Treatment Tolerance: Patient limited by pain  Medical Staff Made Aware: Yes  Strengths: Attitude of self, Coping skills  Barriers to Participation: Comorbidities  End of Session Communication: Bedside nurse  Assessment Comment: The pt performed supine therapeutic exercises with minimal difficulty.  End of Session Patient Position: Bed, 3 rail up, Alarm on  PT Plan  Inpatient/Swing Bed or Outpatient: Inpatient  PT Plan  Treatment/Interventions: Bed mobility, Transfer training, Gait training, Balance training, Endurance training, Therapeutic exercise, Therapeutic activity, Home exercise program  PT Plan: Ongoing PT  PT Frequency: 3 times per week  PT Discharge Recommendations: Moderate intensity level of continued care  Equipment Recommended upon Discharge: Wheeled walker  PT Recommended Transfer Status: Assist x1  PT - OK to Discharge: Yes      General Visit Information:   PT  Visit  PT Received On: 10/07/24  Response to Previous Treatment: Patient reporting fatigue but able to participate.  General  Prior to Session Communication: Bedside nurse  Patient Position Received: Bed, 3 rail up, Alarm on  Preferred Learning Style: verbal, visual, written  General Comment: Pt with c/o high right groin pain from catheter placement, but agreeable to supine therapeutic exercises.    Subjective   Precautions:  Precautions  Hearing/Visual Limitations: Hearing and vision WFL with glasses.  Medical Precautions: Fall precautions, Oxygen therapy device and L/min  Precautions Comment: Pt in  compliance with precautions throughout PT session.    Vital Signs (Past 2hrs)        Date/Time Vitals Session Patient Position Pulse Resp SpO2 BP MAP (mmHg)    10/07/24 1545 --  --  101  16  97 %  104/69  81     10/07/24 1547 Pre PT  Lying  97  --  84 %  --  --                   Vital Signs Comment: SpO2 was 79-84 on room air and 95 on 2L O2     Objective   Pain:  Pain Assessment  Pain Assessment: 0-10  0-10 (Numeric) Pain Score: 9  Pain Type: Acute pain  Pain Location: Groin  Pain Orientation: Right  Pain Descriptors: Throbbing, Aching  Pain Frequency: Constant/continuous  Pain Onset: Ongoing  Clinical Progression: Not changed  Patient's Stated Pain Goal: No pain  Pain Interventions: Therapeutic presence  Response to Interventions: Pain stable  Cognition:  Cognition  Overall Cognitive Status: Within Functional Limits  Orientation Level: Oriented X4  Following Commands: Follows all commands and directions without difficulty  Coordination:  Movements are Fluid and Coordinated: Yes  Coordination Comment: WFL  Postural Control:  Postural Control  Postural Control:  (not assessed)  Static Sitting Balance  Static Sitting-Balance Support:  (not assessed)  Dynamic Sitting Balance  Dynamic Sitting-Balance Support:  (not assessed)  Static Standing Balance  Static Standing-Balance Support:  (not assessed)  Dynamic Standing Balance  Dynamic Standing-Balance Support:  (not assessed)  Extremity/Trunk Assessments:     RUE   RUE : Within Functional Limits  LUE   LUE: Within Functional Limits  RLE   RLE : Exceptions to WFL  AROM RLE (degrees)  RLE AROM Comment: WFL  Strength RLE  R Hip Flexion: 4/5  R Knee Flexion: 4+/5  R Knee Extension: 4+/5  R Ankle Dorsiflexion: 5/5  R Ankle Plantar Flexion: 5/5  LLE   LLE : Exceptions to WFL  AROM LLE (degrees)  LLE AROM Comment: WFL  Strength LLE  L Hip Flexion: 4-/5  L Knee Flexion: 4/5  L Knee Extension: 4/5  L Ankle Dorsiflexion: 4+/5  L Ankle Plantar Flexion: 4+/5  Activity  Tolerance:  Activity Tolerance  Endurance:  (Pt tolerated 10-20 min exercises)  Treatments:  Therapeutic Exercise  Therapeutic Exercise Performed: Yes  Therapeutic Exercise Activity 1: ankle pumps x 15  Therapeutic Exercise Activity 2: heel slides x 15  Therapeutic Exercise Activity 3: SAQ x 15  Therapeutic Exercise Activity 4: Hip ABD x 15    Therapeutic Activity  Therapeutic Activity Performed: No    Balance/Neuromuscular Re-Education  Balance/Neuromuscular Re-Education Activity Performed: No    Bed Mobility  Bed Mobility: No    Ambulation/Gait Training  Ambulation/Gait Training Performed: No  Transfers  Transfer: No    Outcome Measures:  [facility] Basic Mobility  Turning from your back to your side while in a flat bed without using bedrails: A little  Moving from lying on your back to sitting on the side of a flat bed without using bedrails: A little  Moving to and from bed to chair (including a wheelchair): A little  Standing up from a chair using your arms (e.g. wheelchair or bedside chair): A little  To walk in hospital room: Total  Climbing 3-5 steps with railing: Total  Basic Mobility - Total Score: 14    OP EDUCATION:  Outpatient Education  Individual(s) Educated: Patient  Education Provided: Home Exercise Program  Patient Response to Education: Patient/Caregiver Verbalized Understanding of Information, Patient/Caregiver Performed Return Demonstration of Exercises/Activities    Encounter Problems       Encounter Problems (Active)       PT Problem       Patient will complete supine to sit and sit to supine Independent  (Progressing)       Start:  09/17/24    Expected End:  10/18/24            Patient will perform sit<>stand transfer with LRAD, and Modified Independent  (Progressing)       Start:  09/17/24    Expected End:  10/18/24            Patient will ambulate >150' with LRAD and Modified Independent  (Progressing)       Start:  09/17/24    Expected End:  10/18/24            Patient will complete Tinetti  Balance Assesment with a score equal to or better than 18 to reduce falls risk.    (Progressing)       Start:  09/17/24    Expected End:  10/18/24               Pain - Adult

## 2024-10-07 NOTE — PROGRESS NOTES
10/07/24 1000   Discharge Planning   Who is requesting discharge planning? Patient   Home or Post Acute Services Post acute facilities (Rehab/SNF/etc)   Type of Post Acute Facility Services Skilled nursing   Expected Discharge Disposition SNF  ([facility])   Does the patient need discharge transport arranged? Yes   RoundTrip coordination needed? Yes   Has discharge transport been arranged? No     Care Transitions Note  10/07/24  Patient not medically ready. Patient to receive exchange today or tomorrow for sickle cell management. Pt will need to stay 5 more days for transition bewten lovenox to coumadin. Pre-cert was started over the weekend and approved, but will need to be re-started closer to discharge. Updated PT/OT notes will be needed. Will follow as discharge approaches. Facility made aware, updates sent.   YULI Zamorano

## 2024-10-07 NOTE — PRE-PROCEDURE NOTE
Interventional Radiology Pre-Procedure Note    Indication for procedure: The primary encounter diagnosis was Acute liver failure without hepatic coma (HHS-HCC). Diagnoses of Sickle cell crisis (Multi), Acute on chronic respiratory failure with hypoxia (Multi), Troponin level elevated, NSTEMI (non-ST elevated myocardial infarction) (Multi), Rash and other nonspecific skin eruption, Sickle cell anemia with pain (Multi), Deep vein thrombosis (DVT) of distal vein of lower extremity, unspecified chronicity, unspecified laterality (Multi), Personal history of other venous thrombosis and embolism, Warfarin anticoagulation, Drug eruption, fixed, Localized edema, and Sickle cell anemia without crisis (Multi) were also pertinent to this visit.    Planned Procedure: Trialysis Line Placement    Relevant review of systems: NA    Relevant Labs:   Lab Results   Component Value Date    CREATININE 0.99 10/07/2024    EGFR 67 10/07/2024    INR 1.2 (H) 10/07/2024    PROTIME 13.2 (H) 10/07/2024       Planned Sedation/Anesthesia: Minimal    Airway assessment: normal    Directed physical examination:    Aox3  Normal rate of respirations  Groin is without erythema or swelling      Mallampati: III (soft and hard palate and base of uvula visible)    ASA Score: ASA 2 - Patient with mild systemic disease with no functional limitations    Benefits, risks and alternatives of procedure and planned sedation have been discussed with the patient and/or their representative. All questions answered and they agree to proceed.     Julio Edgar DO  Interventional Radiology  Nursing Quarterback: [id number], IR pager: [phone]     NON-Urgent on call weekends and after hours weekdays (5pm - 5am) IR pager: [phone]  Urgent & emergent on call weekends and after hours weekdays (5pm-7am) IR pager: [phone]

## 2024-10-07 NOTE — PROGRESS NOTES
"Senait Narvaez is a 54 y.o. female on day 27 of admission presenting with Sickle cell disease with crisis and other complication.    Subjective   Seen this AM at the bedside. Reports that her pain is stable today, not worsening. Mostly still in her LLE. Discussed plan for apheresis line placement today and either RBCEx later today or tomorrow. Also discussed plan for bridging back to coumadin with lovenox which will take a few days. She understands. She offers no other complaints or concerns today. She denies any fevers/chills, SOB, or CP.    Objective     Physical Exam  Vitals reviewed.   Constitutional:       Appearance: Normal appearance. She is obese.   HENT:      Head: Normocephalic and atraumatic.      Nose: Nose normal.      Mouth/Throat:      Mouth: Mucous membranes are moist.      Pharynx: Oropharynx is clear.   Eyes:      Extraocular Movements: Extraocular movements intact.      Pupils: Pupils are equal, round, and reactive to light.   Cardiovascular:      Rate and Rhythm: Normal rate and regular rhythm.      Pulses: Normal pulses.      Heart sounds: Normal heart sounds.   Pulmonary:      Effort: Pulmonary effort is normal.      Breath sounds: Normal breath sounds.   Abdominal:      General: Bowel sounds are normal.      Palpations: Abdomen is soft.   Musculoskeletal:         General: Normal range of motion.   Skin:     General: Skin is warm.      Comments: Various wounds including b/l breast, LLE, and perineal excoriation   Neurological:      General: No focal deficit present.      Mental Status: She is alert and oriented to person, place, and time. Mental status is at baseline.   Psychiatric:         Mood and Affect: Mood normal.         Behavior: Behavior normal.     Last Recorded Vitals  Blood pressure 103/66, pulse 78, temperature 36.1 °C (97 °F), temperature source Temporal, resp. rate 17, height 1.651 m (5' 5\"), weight 113 kg (248 lb 7.3 oz), SpO2 100%.  Intake/Output last 3 Shifts:  I/O last 3 " completed shifts:  In: 1510.6 (13.4 mL/kg) [P.O.:850; I.V.:660.6 (5.9 mL/kg)]  Out: 475 (4.2 mL/kg) [Urine:475 (0.1 mL/kg/hr)]  Weight: 112.7 kg     Results for orders placed or performed during the hospital encounter of 09/10/24 (from the past 24 hour(s))   POCT GLUCOSE   Result Value Ref Range    POCT Glucose 93 74 - 99 mg/dL   Heparin Assay, [facility]   Result Value Ref Range    Heparin Unfractionated 0.9 See Comment Below for Therapeutic Ranges IU/mL   Heparin Assay, [facility]   Result Value Ref Range    Heparin Unfractionated 0.8 See Comment Below for Therapeutic Ranges IU/mL   Heparin Assay, [facility]   Result Value Ref Range    Heparin Unfractionated 0.7 See Comment Below for Therapeutic Ranges IU/mL   Heparin Assay, [facility]   Result Value Ref Range    Heparin Unfractionated 0.2 See Comment Below for Therapeutic Ranges IU/mL   APTT   Result Value Ref Range    aPTT 35 27 - 38 seconds   Protime-INR   Result Value Ref Range    Protime 13.2 (H) 9.8 - 12.8 seconds    INR 1.2 (H) 0.9 - 1.1     *Note: Due to a large number of results and/or encounters for the requested time period, some results have not been displayed. A complete set of results can be found in Results Review.       Scheduled medications  folic acid, 1 mg, oral, Daily  [Held by provider] metoprolol tartrate, 12.5 mg, oral, BID  nystatin, 1 Application, Topical, BID  oxygen, , inhalation, Continuous - Inhalation  pantoprazole, 40 mg, intravenous, Daily  polyethylene glycol, 17 g, oral, BID  sennosides, 2 tablet, oral, BID  thiamine, 100 mg, oral, Daily  triamcinolone, , Topical, BID  [Held by provider] warfarin, 5 mg, oral, Daily  white petrolatum, , Topical, BID      Continuous medications  [Held by provider] heparin, 0-4,500 Units/hr, Last Rate: Stopped (10/07/24 0359)      PRN medications  PRN medications: acetaminophen, albuterol, alteplase, bisacodyl, dextrose, dextrose, glucagon, glucagon, guaiFENesin, heparin, naloxone, ondansetron, oxyCODONE, oxygen, sodium  chloride       Assessment/Plan   Assessment & Plan  Sickle cell disease with crisis and other complication  Senait Narvaez is a 54 year-old female with a PMHx of Hgb SC disease (previously on exchange transfusions q4-6 weeks, last transfusion 3/1/24), hx of SVC syndrome, 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 Patient was subsequently intubated due to worsening lethargic and lack of respiratory compensation from significant metabolic acidosis with concern for ACS. Vancomycin and Zosyn started for empiric coverage, patient also started on pressors for BP support. Patient was transfused 2 units pRBCs and then underwent RBCEx on 9/11. Cardiology consulted for c/f NSTEMI vs demand ischemia (ST depressions and elevated troponin), rec treat as ACS. Course further c/b severe CHARLES with peak sCr of 5.4, and acute liver injury with severely elevated liver enzymes (ALT 4119, AST >10k). Liver US only remarkable for fatty infiltration, viral hepatitis panel negative. CHARLES and liver injury improved with supportive management. Pruritic rash noted 9/14, Derm consulted and took multiple biopsies 9/15. Rash then began to worsen to involve the groin, lateral thigh, and scalp with numerous tense bullae. Discontinued heparin given concerns for potential HIT, started bivalirudin. However PF4 was negative. Patient was extubated on 9/16/2024. Started stress dose steroids given continued pressor requirements, pressors Dc'd 9/17.     Patient transferred to [facility] 9/17, PT/OT rec SNF. Coumadin bridge was started 9/18. Given persistent rash with unremarkable labs, derm re-biopsied on 9/18, findings ultimately felt to be most c/w fixed drug eruption. Dermatology contacted for bx results (9/20) rec triamcinolone cream  BID and interdry cloths. Leukocytosis uptrending 9/21, repeat infectious workup negative (CXR, Abd XR, blood cultures, MRSA negative 9/21). UA with leuks. On 9/25, per nursing and patient, rash seems to be worsening. Derm re-engaged recommended to apply vasoline to all eroded/denuded areas and continue triamcinolone cream to both the black plaques as well as the red areas across the trunk and thighs. 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 Wound care following. For warfarin dosing, per pharmacy, if INR between 2-3 can restart home warfarin 5mg daily, if INR < 2 restart bival. INR 3.4 on 9/25, warfarin stopped, repeat INR 2.8 9/26 and warfarin restarted. On 9/26, increased PO oxycodone and discontinued IV dilaudid in anticipation for discharge. Overnight on 9/29, brain attack called out of concern for change in mental status, no focal deficits noted, BAT cancelled and narcan administered x3 with improvement of mental status. Mentation improved as of 10/2, small dose oxy resumed. INR supratherapeutic 10/3 at 6.7, 5mg IV Vit K x1 given (ok per pharmacy) I/s/o ?bleeding from vagina vs wound excoriation and UA with +blood. INR  down to 1.5 (10/4). Plan for RBCEx prior to dc per Dr. Whaley, likely Tuesday 8/8. 10/5 ordered duplex BLE as pt c/o severe LLE pain and swelling which was negative. 10/5 started heparin drip (per attending) given normal INR while pending apheresis line placement (previous HIT workup with negative PF4). Plan to bridge back to coumadin with lovenox after apheresis line placed today 10/7. Plan to start therapeutic Lovenox 120mg BID with Coumadin 5mg nightly (regimen discussed with pharmacy). DC to SNF pending improvement in pain, skin, INR/coumadin bridge, and RBCEx (likely end of this week d/t bridging time).     # Waxing and waning AMS-- improved  - Felt to be due to opioids i/s/o worsening renal  function and c/b respiratory acidosis  - All opioids on hold (9/28-10/2 resumed small dose oxy)  - Keep supplemental O2 on board, but titrate to maintain SPO2 of 90-92% at most  - On CPAP at home, enforce nightly use to extent possible; may need BiPAP if unresolved     # CHARLES on CKD II, improving  - Baseline ~ SCr <2, peak 5.4 this admission. 9/21 sCr 1.58 --> 1.13 (9/25)--> 1.14 (10/3)--> 0.96 (10/6)--> 0.99 (10/7)  - Likely pre-renal vs ATN given persistant hypotension over admission, on CKD II, now polyuric phase  - FeNA 0.4%  - Severe hyperkalemia with previous peaked T waves on EKG, resolved  - Nephrology following  - s/p multiple K cocktails in MICU  - Encourage increased PO intake     # Purpuric rash, suspected fixed drug eruption  # Concern for possible heparin-induced thrombocytopenia (HIT) -resolved  - Purple, reticular pattern of plaques noted around the bilateral breasts, scalp and groin,  with tense bullae. Non-erythematous, no purulence  - Some initial concern for possible heparin-induced thrombocytopenia with concurrent skin findings. Discontinued heparin 9/16 and transitioned to bivalirudin  - 4 T score 0, PF4 w/ reflex BINA negative. Hematology ultimately consulted, felt there was low concern for HIT and signed off  - Differential diagnosis includes purpura secondary to infection versus coagulopathy versus vasculopathy versus small and/or medium vessel vasculitis versus calciphylaxis vs drug rash  - Dermatology consulted, work-up to date:  - Low Protein C activity  - ANCA, PR3 and MPO negative  - ISABELLE neg  - cryoglobulin labs- not enough specimen to analyze  - S/p skin punch biopsies x 2 9/15 and one on 9/18, showed SUPERFICIAL EPIDERMAL DEGENERATION WITH ERYTHROCYTE EXTRAVASATION WITH NEUTROPHILS. These findings could be seen secondary to an older trauma, or resolving erythema multiforme, a fixed drug eruption, Edgar-Christopher syndrome, or toxic epidermal necrolysis. Favor Multifocal Fixed Drug  Eruption    - Dermatology contacted (9/20) about results, recommended adding vancomycin to allergy list and starting triamcinolone  - Continue triamcinolone 0.1% cream BID (9/20-current) per derm recs  - On 9/25, concern from pt and nursing that rash is not improving. Under breasts and groin still causing pain. Bilateral upper thighs and forehead rash improving   - Wound care re-consulted 9/25, s/o 10/3 as derm gave recs below (wound pictures documented in WC note from 10/3)  - Re-engaged dermatology on 9/25 given worsening rash; recommended to apply vasoline to all eroded/denuded areas and continue triamcinolone cream to both the black plaques as well as the red areas across the trunk and thighs. Per dermatology, erythema is mild and not palpable, it is most likely part of the drug eruption the patient has previously been known to have     # Leukocytosis-- improved  - Likely I/s/o rash vs reactive vs infectious process  - Leukocytosis noted on admission to MICU (WBC 24.8) --> 12.7 (9/25) --> 8.3 (10/3)  - Leukocytosis improved throughout hospital stay however started uptrending 9/20; now improving on 9/25   - Blood cultures 9/10, 9/12, 9/15 all NGTD  - Strep and Legionella Ag, MRSA nares negative  - CXR (9/21) largely unremarkable, again re-demonstrated perihilar prominence seen on prior XR  - Abdominal xray (9/21) unremarkable  - Repeat blood cultures x 2 (9/21) NGTD  - UA (9/21) with 500 LE  - UA (9/30) +bacteria, +leuks, urine culture (9/29) mult organisms  - UA sent 10/3 with +blood, leuks, and WBC- not reflex to cx so UCX added on to 10/3 UA and again showed multiple organisms  - Will hold off tx for UTI for now as pt asymptomatic and leukocytosis improved. 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  # ACS  - Previously managed through routine RBC exchanges q6 weeks, most recent RBCEx 3/1/24, per patient pausing on transfusions for time being  - OARRS reviewed, no aberrant behavior  noted  - LDH>12,000 (now downtrending), haptoglobin <30, fibrinogen 318, retic % 5.2 on presentation, bili 10.7  - Leukocytosis 24.8 on admission to MICU  - CXR (9/10) persistent atelectasis/scarring in the left lower lung  - CT CAP (9/11) Diffuse mosaic attenuation of the lung parenchyma, which can be seen in the setting of small airway disease, pulmonary edema or acute infection  - Intubated upon arrival to MICU, now s/p extubation 9/16  - s/p Vanc and Zosyn (finished both courses prior to floor transfer)  - Procal elevated 7.09 (9/11)  - Blood cultures 9/10, 9/12, 9/15 all NGTD  - Strep and Legionella Ag, MRSA nares negative  - Trop peak 1431, cards rec treat as ACS (see below)  - [facility]  - S/p exchange transfusion 9/11 AM for ACS  - Care plan from 12/6/23- For mild to moderate pain: Dilaudid 0.5mg IVP q3h as needed, for moderate to severe pain Dilaudid 1- 1.5mg q3h PRN  - On arrival to [facility], started on 0.6mg dilaudid IVP q3h PRN severe pain   - s/p 0.6mg dilaudid IVP q4h PRN breakthrough and 15mg PO oxycodone Q4 hrs severe pain (9/25)  - Per discussion with Dr. Whaley (9/25), okay to increase PO oxycodone to 15-20mg Q4hr to optimize pain for discharge to SNF.   - On 9/26, discontinued IV dilaudid and increased to 20mg PO oxycodone q4hrs for severe pain   - Opioids on hold as of 9/28 d/t AMS- resumed 2.5mg oxy q4hrs PRN severe pain on 10/2. ER oxy 10mg BID still held  - (10/5- current) increased pain reg as AMS resolved--> continue oxycodone 10mg q3hrs PRN severe pain  - Continue to hold ER Oxycontin indefinitely as was major contributor to AMS (was started in ICU and was not on this prior to admission)  - Bowel regimen for opioid induced constipation with Senna 2tabs BID, Miralax BID, and dulcolax suppository 10mg PRN constipation  - Zofran for opioid induced nausea, no benadryl ordered (assumed from previous AMS but also no reports of opioid-induced pruritus)  - Continue home Folic  Acid 1mg daily, continue thiamine 100mg daily     # ST depression with tropinemia, suspected 2/2 demand ischemia iso ACS  # Hx SVT  - Elevated troponins on admission, peak 1431 and now downtrending   - EKG with ST depressions, likely Type II MI due to vaso-occlusive crisis and anemia  - TTE 9/11: EF 60-65%, RV enlarged and reduced in function which appears stable when compared to last TTE (1/2024)  - Troponin leak is likely due to cardiac demand vs supply mismatch in the setting of worsening anemia and vaso-occlusive crisis  - Cardiology consulted, now signed off with indication to treat troponemia as ACS  - Lasix held in MICU 2/2 hypotension and CHARLES, can resume as able   - Metop 12.5mg BID held in MICU 2/2 hypotension- BP and HR continue to remain stable, holding off on restarting for now  - Follow-up with Cardiology outpt, FUV 2/13/25     # Acute liver injury, improving  # Direct hyperbilirubinuria, improving  - Suspected 2/2 hemodynamic instability versus sickle cell crisis  - On presentation to  MICU ALT 2611, AST 4727, T bili 10.2, INR 3.0; continues to improve  - CT with hepatic venous congestion, patient lacks gallbladder  - Liver US with Doppler: Diffuse fatty infiltration, unremarkable doppler interrogation of the liver  - EBV IgG positive, IgM negative  - CMV IgG positive, PCR negative  - Acute hepatitis panel unremarkable  - Hepatology initially following, now signed off  - Will continue to monitor, trend daily CMP     # pHTN   - Initial c/f CTEPH as imaging findings with dilated PA, filling defects, and mosaicism  - VQ scan (6/13/23) with non anatomical basal defects and RUL defect due to scar--> not favoring CTEPH per Dr. Yi and Dr. 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 On lifelong anticoagulation, DOAC discouraged due to high risk of clotting   - Home Coumadin 5mg daily initially HELD on admission to MICU 2/2 unstable course  - Restarted coumadin 9/18 PM, will plan to bridge with bival, Ok'd by heme   - 9/20 INR 3.8, bival and home warfarin held. Pharmacy rec repeat INR 4 hours after bival was held. If the INR < 2, we can restart the bival, but if the INR 2-3, we can continue with warfarin monotherapy   - 9/20 repeat INR 3.7, pharmacy rec continuing to hold both medications  - 9/21 INR 3.2, per pharmacy - get repeat INR in AM 9/22, if INR between 2-3 can restart home warfarin 5mg daily  -INR 2.6 (9/24) after restarting Warfarin 5mg, will maintain INR goal of 2-3  - INR 3.4 (9/25). Held warfarin on 9/25. Repeat INR 9/26. Plan to restart if <3   - INR 2.8 (9/26), restarted warfarin, recheck INR 9/27   - INR 4.0 (9/30), held warfarin, repeat INR 10/1 ordered, plan to restart if <3  - INR 6.8 (10/1), patient without any active bleeding, per attending and pharmacy, warfarin held   - INR 6.8 (10/2), patient without any active bleeding, per attending and pharmacy, warfarin held   - INR 6.7 (10/3), cont to hold warfarin. Ordered 5mg IV Vit K x1 (ok per pharmacy) d/t ?bleeding, vaginal vs wound excoriation and UA +blood  - 10/4 INR normalized to 1.6, continue to hold warfarin  - 10/5 pt c/o severe LLE pain and swelling; duplex BLE negative  - 10/5 INR 1.2; started heparin drip given normal INR while pending apheresis line placement (previous HIT workup with negative PF4)--> held 10/7 at 0400 for apheresis line  placement  - 10/7 INR 1.2--> plan to start bridge back to coumadin tonight with lovenox. After apheresis line placement today will start coumadin 5mg and start therapeutic lovenox 120mg BID (regimen discussed with pharmacy)  - Follows with Coumadin clinic outpatient  - Plan to consult vascular medicine if INR is not therapeutic on home regimen      # CAN  - Continue home CPAP and home Albuterol PRN     # H/O Cauda Equine syndrome  - Follows Dr. Delacruz as outpatient  - No current issues      DISPO:  - Full code- confirmed on admission  - NOK: Tati Salgado (mother) (#[phone])  - PT/OT rec SNF, plan [facility] rehab at [facility]  - DC to SNF pending improvement in pain, skin, INR/coumadin bridge, and RBCEx  - Derm FUV 11/7, Sickle Cell FUV 11/18, Cardiology FUV 2/13    I spent >60 minutes in the professional and overall care of this patient    Assessment and plan as above discussed with attending physician Dr. Shala Hunt, APRN-CNP

## 2024-10-07 NOTE — ASSESSMENT & PLAN NOTE
Senait Narvaez is a 54 year-old female with a PMHx of Hgb SC disease (previously on exchange transfusions q4-6 weeks, last transfusion 3/1/24), hx of SVC syndrome, recurrent DVT/PE (on lifelong Coumadin), pHTN (6/2023), cauda equina with saddle numbness, hx SVT, fibromyalgia, Raynaud's disease, CKD II (baseline SCr~<2) and CAN who presented to [facility] ED for diffuse pain and lethargy, then transferred to  MICU for hemodynamic instability. Patient was subsequently intubated due to worsening lethargic and lack of respiratory compensation from significant metabolic acidosis with concern for ACS. Vancomycin and Zosyn started for empiric coverage, patient also started on pressors for BP support. Patient was transfused 2 units pRBCs and then underwent RBCEx on 9/11. Cardiology consulted for c/f NSTEMI vs demand ischemia (ST depressions and elevated troponin), rec treat as ACS. Course further c/b severe CHARLES with peak sCr of 5.4, and acute liver injury with severely elevated liver enzymes (ALT 4119, AST >10k). Liver US only remarkable for fatty infiltration, viral hepatitis panel negative. CHARLES and liver injury improved with supportive management. Pruritic rash noted 9/14, Derm consulted and took multiple biopsies 9/15. Rash then began to worsen to involve the groin, lateral thigh, and scalp with numerous tense bullae. Discontinued heparin given concerns for potential HIT, started bivalirudin. However PF4 was negative. Patient was extubated on 9/16/2024. Started stress dose steroids given continued pressor requirements, pressors Dc'd 9/17.     Patient transferred to [facility] 9/17, PT/OT rec SNF. Coumadin bridge was started 9/18. Given persistent rash with unremarkable labs, derm re-biopsied on 9/18, findings ultimately felt to be most c/w fixed drug eruption. Dermatology contacted for bx results (9/20) rec triamcinolone cream BID and interdry cloths. Leukocytosis uptrending 9/21, repeat infectious workup negative (CXR, Abd XR, blood  cultures, MRSA negative 9/21). UA with leuks. On 9/25, per nursing and patient, rash seems to be worsening. Derm re-engaged recommended to apply vasoline to all eroded/denuded areas and continue triamcinolone cream to both the black plaques as well as the red areas across the trunk and thighs. 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 Wound care following. For warfarin dosing, per pharmacy, if INR between 2-3 can restart home warfarin 5mg daily, if INR < 2 restart bival. INR 3.4 on 9/25, warfarin stopped, repeat INR 2.8 9/26 and warfarin restarted. On 9/26, increased PO oxycodone and discontinued IV dilaudid in anticipation for discharge. Overnight on 9/29, brain attack called out of concern for change in mental status, no focal deficits noted, BAT cancelled and narcan administered x3 with improvement of mental status. Mentation improved as of 10/2, small dose oxy resumed. INR supratherapeutic 10/3 at 6.7, 5mg IV Vit K x1 given (ok per pharmacy) I/s/o ?bleeding from vagina vs wound excoriation and UA with +blood. INR  down to 1.5 (10/4). Plan for RBCEx prior to dc per Dr. Whaley, likely Tuesday 8/8. 10/5 ordered duplex BLE as pt c/o severe LLE pain and swelling which was negative. 10/5 started heparin drip (per attending) given normal INR while pending apheresis line placement (previous HIT workup with negative PF4). Plan to bridge back to coumadin with lovenox after apheresis line placed today 10/7. Plan to start therapeutic Lovenox 120mg BID with Coumadin 5mg nightly (regimen discussed with pharmacy). DC to SNF pending improvement in pain, skin, INR/coumadin bridge, and RBCEx (likely end of this week d/t bridging time).     # Waxing and waning AMS-- improved  - Felt to be due to opioids i/s/o worsening renal function and c/b respiratory acidosis  - All opioids on hold (9/28-10/2 resumed small dose oxy)  - Keep  supplemental O2 on board, but titrate to maintain SPO2 of 90-92% at most  - On CPAP at home, enforce nightly use to extent possible; may need BiPAP if unresolved     # CHARLES on CKD II, improving  - Baseline ~ SCr <2, peak 5.4 this admission. 9/21 sCr 1.58 --> 1.13 (9/25)--> 1.14 (10/3)--> 0.96 (10/6)--> 0.99 (10/7)  - Likely pre-renal vs ATN given persistant hypotension over admission, on CKD II, now polyuric phase  - FeNA 0.4%  - Severe hyperkalemia with previous peaked T waves on EKG, resolved  - Nephrology following  - s/p multiple K cocktails in MICU  - Encourage increased PO intake     # Purpuric rash, suspected fixed drug eruption  # Concern for possible heparin-induced thrombocytopenia (HIT) -resolved  - Purple, reticular pattern of plaques noted around the bilateral breasts, scalp and groin,  with tense bullae. Non-erythematous, no purulence  - Some initial concern for possible heparin-induced thrombocytopenia with concurrent skin findings. Discontinued heparin 9/16 and transitioned to bivalirudin  - 4 T score 0, PF4 w/ reflex BINA negative. Hematology ultimately consulted, felt there was low concern for HIT and signed off  - Differential diagnosis includes purpura secondary to infection versus coagulopathy versus vasculopathy versus small and/or medium vessel vasculitis versus calciphylaxis vs drug rash  - Dermatology consulted, work-up to date:  - Low Protein C activity  - ANCA, PR3 and MPO negative  - ISABELLE neg  - cryoglobulin labs- not enough specimen to analyze  - S/p skin punch biopsies x 2 9/15 and one on 9/18, showed SUPERFICIAL EPIDERMAL DEGENERATION WITH ERYTHROCYTE EXTRAVASATION WITH NEUTROPHILS. These findings could be seen secondary to an older trauma, or resolving erythema multiforme, a fixed drug eruption, Edgar-Christopher syndrome, or toxic epidermal necrolysis. Favor Multifocal Fixed Drug Eruption    - Dermatology contacted (9/20) about results, recommended adding vancomycin to allergy list and  starting triamcinolone  - Continue triamcinolone 0.1% cream BID (9/20-current) per derm recs  - On 9/25, concern from pt and nursing that rash is not improving. Under breasts and groin still causing pain. Bilateral upper thighs and forehead rash improving   - Wound care re-consulted 9/25, s/o 10/3 as derm gave recs below (wound pictures documented in WC note from 10/3)  - Re-engaged dermatology on 9/25 given worsening rash; recommended to apply vasoline to all eroded/denuded areas and continue triamcinolone cream to both the black plaques as well as the red areas across the trunk and thighs. Per dermatology, erythema is mild and not palpable, it is most likely part of the drug eruption the patient has previously been known to have     # Leukocytosis-- improved  - Likely I/s/o rash vs reactive vs infectious process  - Leukocytosis noted on admission to MICU (WBC 24.8) --> 12.7 (9/25) --> 8.3 (10/3)  - Leukocytosis improved throughout hospital stay however started uptrending 9/20; now improving on 9/25   - Blood cultures 9/10, 9/12, 9/15 all NGTD  - Strep and Legionella Ag, MRSA nares negative  - CXR (9/21) largely unremarkable, again re-demonstrated perihilar prominence seen on prior XR  - Abdominal xray (9/21) unremarkable  - Repeat blood cultures x 2 (9/21) NGTD  - UA (9/21) with 500 LE  - UA (9/30) +bacteria, +leuks, urine culture (9/29) mult organisms  - UA sent 10/3 with +blood, leuks, and WBC- not reflex to cx so UCX added on to 10/3 UA and again showed multiple organisms  - Will hold off tx for UTI for now as pt asymptomatic and leukocytosis improved. 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  # ACS  - Previously managed through routine RBC exchanges q6 weeks, most recent RBCEx 3/1/24, per patient pausing on transfusions for time being  - OARRS reviewed, no aberrant behavior noted  - LDH>12,000 (now downtrending), haptoglobin <30, fibrinogen 318, retic % 5.2 on presentation, bili  10.7  - Leukocytosis 24.8 on admission to MICU  - CXR (9/10) persistent atelectasis/scarring in the left lower lung  - CT CAP (9/11) Diffuse mosaic attenuation of the lung parenchyma, which can be seen in the setting of small airway disease, pulmonary edema or acute infection  - Intubated upon arrival to MICU, now s/p extubation 9/16  - s/p Vanc and Zosyn (finished both courses prior to floor transfer)  - Procal elevated 7.09 (9/11)  - Blood cultures 9/10, 9/12, 9/15 all NGTD  - Strep and Legionella Ag, MRSA nares negative  - Trop peak 1431, cards rec treat as ACS (see below)  - S/p 2u pRBCs 9/10 on arrival to MICU  - S/p exchange transfusion 9/11 AM for ACS  - Care plan from 12/6/23- For mild to moderate pain: Dilaudid 0.5mg IVP q3h as needed, for moderate to severe pain Dilaudid 1- 1.5mg q3h PRN  - On arrival to [facility], started on 0.6mg dilaudid IVP q3h PRN severe pain   - s/p 0.6mg dilaudid IVP q4h PRN breakthrough and 15mg PO oxycodone Q4 hrs severe pain (9/25)  - Per discussion with Dr. Whaley (9/25), okay to increase PO oxycodone to 15-20mg Q4hr to optimize pain for discharge to SNF.   - On 9/26, discontinued IV dilaudid and increased to 20mg PO oxycodone q4hrs for severe pain   - Opioids on hold as of 9/28 d/t AMS- resumed 2.5mg oxy q4hrs PRN severe pain on 10/2. ER oxy 10mg BID still held  - (10/5- current) increased pain reg as AMS resolved--> continue oxycodone 10mg q3hrs PRN severe pain  - Continue to hold ER Oxycontin indefinitely as was major contributor to AMS (was started in ICU and was not on this prior to admission)  - Bowel regimen for opioid induced constipation with Senna 2tabs BID, Miralax BID, and dulcolax suppository 10mg PRN constipation  - Zofran for opioid induced nausea, no benadryl ordered (assumed from previous AMS but also no reports of opioid-induced pruritus)  - Continue home Folic Acid 1mg daily, continue thiamine 100mg daily     # ST depression with tropinemia, suspected 2/2 demand  ischemia iso ACS  # Hx SVT  - Elevated troponins on admission, peak 1431 and now downtrending   - EKG with ST depressions, likely Type II MI due to vaso-occlusive crisis and anemia  - TTE 9/11: EF 60-65%, RV enlarged and reduced in function which appears stable when compared to last TTE (1/2024)  - Troponin leak is likely due to cardiac demand vs supply mismatch in the setting of worsening anemia and vaso-occlusive crisis  - Cardiology consulted, now signed off with indication to treat troponemia as ACS  - Lasix held in MICU 2/2 hypotension and CHARLES, can resume as able   - Metop 12.5mg BID held in MICU 2/2 hypotension- BP and HR continue to remain stable, holding off on restarting for now  - Follow-up with Cardiology outpt, FUV 2/13/25     # Acute liver injury, improving  # Direct hyperbilirubinuria, improving  - Suspected 2/2 hemodynamic instability versus sickle cell crisis  - On presentation to  MICU ALT 2611, AST 4727, T bili 10.2, INR 3.0; continues to improve  - CT with hepatic venous congestion, patient lacks gallbladder  - Liver US with Doppler: Diffuse fatty infiltration, unremarkable doppler interrogation of the liver  - EBV IgG positive, IgM negative  - CMV IgG positive, PCR negative  - Acute hepatitis panel unremarkable  - Hepatology initially following, now signed off  - Will continue to monitor, trend daily CMP     # pHTN   - Initial c/f CTEPH as imaging findings with dilated PA, filling defects, and mosaicism  - VQ scan (6/13/23) with non anatomical basal defects and RUL defect due to scar--> not favoring CTEPH per Dr. Yi and Dr. 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 On lifelong anticoagulation, DOAC discouraged due to high risk of clotting   - Home Coumadin 5mg daily initially HELD on admission to MICU 2/2 unstable course  - Restarted coumadin 9/18 PM, will plan to bridge with bival, Ok'd by heme   - 9/20 INR 3.8, bival and home warfarin held. Pharmacy rec repeat INR 4 hours after bival was held. If the INR < 2, we can restart the bival, but if the INR 2-3, we can continue with warfarin monotherapy   - 9/20 repeat INR 3.7, pharmacy rec continuing to hold both medications  - 9/21 INR 3.2, per pharmacy - get repeat INR in AM 9/22, if INR between 2-3 can restart home warfarin 5mg daily  -INR 2.6 (9/24) after restarting Warfarin 5mg, will maintain INR goal of 2-3  - INR 3.4 (9/25). Held warfarin on 9/25. Repeat INR 9/26. Plan to restart if <3   - INR 2.8 (9/26), restarted warfarin, recheck INR 9/27   - INR 4.0 (9/30), held warfarin, repeat INR 10/1 ordered, plan to restart if <3  - INR 6.8 (10/1), patient without any active bleeding, per attending and pharmacy, warfarin held   - INR 6.8 (10/2), patient without any active bleeding, per attending and pharmacy, warfarin held   - INR 6.7 (10/3), cont to hold warfarin. Ordered 5mg IV Vit K x1 (ok per pharmacy) d/t ?bleeding, vaginal vs wound excoriation and UA +blood  - 10/4 INR normalized to 1.6, continue to hold warfarin  - 10/5 pt c/o severe LLE pain and swelling; duplex BLE negative  - 10/5 INR 1.2; started heparin drip given normal INR while pending apheresis line placement (previous HIT workup with negative PF4)--> held 10/7 at 0400 for apheresis line placement  - 10/7 INR 1.2--> plan to start bridge back to coumadin tonight with lovenox. After apheresis line  placement today will start coumadin 5mg and start therapeutic lovenox 120mg BID (regimen discussed with pharmacy)  - Follows with Coumadin clinic outpatient  - Plan to consult vascular medicine if INR is not therapeutic on home regimen      # CAN  - Continue home CPAP and home Albuterol PRN     # H/O Cauda Equine syndrome  - Follows Dr. Delacruz as outpatient  - No current issues      DISPO:  - Full code- confirmed on admission  - NOK: Tati Salgado (mother) (#[phone])  - PT/OT rec SNF, plan for rehab at [facility]  - DC to SNF pending improvement in pain, skin, INR/coumadin bridge, and RBCEx  - Derm FUV 11/7, Sickle Cell FUV 11/18, Cardiology FUV 2/13

## 2024-10-08 ENCOUNTER — APPOINTMENT (OUTPATIENT)
Dept: RADIOLOGY | Facility: HOSPITAL | Age: 54
DRG: 811 | End: 2024-10-08
Payer: COMMERCIAL

## 2024-10-08 ENCOUNTER — APPOINTMENT (OUTPATIENT)
Dept: OTHER | Facility: HOSPITAL | Age: 54
DRG: 811 | End: 2024-10-08
Payer: COMMERCIAL

## 2024-10-08 LAB
ALBUMIN SERPL BCP-MCNC: 2.4 G/DL (ref 3.4–5)
ALP SERPL-CCNC: 128 U/L (ref 33–110)
ALT SERPL W P-5'-P-CCNC: 19 U/L (ref 7–45)
ANION GAP SERPL CALC-SCNC: 11 MMOL/L (ref 10–20)
AST SERPL W P-5'-P-CCNC: 29 U/L (ref 9–39)
BASOPHILS # BLD AUTO: 0.04 X10*3/UL (ref 0–0.1)
BASOPHILS NFR BLD AUTO: 0.4 %
BILIRUB SERPL-MCNC: 2 MG/DL (ref 0–1.2)
BUN SERPL-MCNC: 10 MG/DL (ref 6–23)
CA-I BLD-SCNC: 1.04 MMOL/L (ref 1.1–1.33)
CALCIUM SERPL-MCNC: 8.4 MG/DL (ref 8.6–10.6)
CHLORIDE SERPL-SCNC: 98 MMOL/L (ref 98–107)
CO2 SERPL-SCNC: 33 MMOL/L (ref 21–32)
CREAT SERPL-MCNC: 0.97 MG/DL (ref 0.5–1.05)
EGFRCR SERPLBLD CKD-EPI 2021: 69 ML/MIN/1.73M*2
EOSINOPHIL # BLD AUTO: 0.32 X10*3/UL (ref 0–0.7)
EOSINOPHIL NFR BLD AUTO: 3.2 %
ERYTHROCYTE [DISTWIDTH] IN BLOOD BY AUTOMATED COUNT: 16.8 % (ref 11.5–14.5)
ERYTHROCYTE [DISTWIDTH] IN BLOOD BY AUTOMATED COUNT: 18.6 % (ref 11.5–14.5)
GLUCOSE SERPL-MCNC: 63 MG/DL (ref 74–99)
HCT VFR BLD AUTO: 25.2 % (ref 36–46)
HCT VFR BLD AUTO: 28.9 % (ref 36–46)
HEMOGLOBIN A2: 3.1 % (ref 2–3.5)
HEMOGLOBIN A: 24.9 % (ref 95.8–98)
HEMOGLOBIN C: 39.3 %
HEMOGLOBIN F: 0.5 % (ref 0–2)
HEMOGLOBIN IDENTIFICATION INTERPRETATION: ABNORMAL
HEMOGLOBIN S: 32.2 %
HGB BLD-MCNC: 8.4 G/DL (ref 12–16)
HGB BLD-MCNC: 9.6 G/DL (ref 12–16)
HGB RETIC QN: 31 PG (ref 28–38)
IMM GRANULOCYTES # BLD AUTO: 0.12 X10*3/UL (ref 0–0.7)
IMM GRANULOCYTES NFR BLD AUTO: 1.2 % (ref 0–0.9)
IMMATURE RETIC FRACTION: 13.7 %
INR PPP: 1.3 (ref 0.9–1.1)
LDH SERPL L TO P-CCNC: 238 U/L (ref 84–246)
LYMPHOCYTES # BLD AUTO: 1.66 X10*3/UL (ref 1.2–4.8)
LYMPHOCYTES NFR BLD AUTO: 16.9 %
MAGNESIUM SERPL-MCNC: 1.63 MG/DL (ref 1.6–2.4)
MCH RBC QN AUTO: 29.8 PG (ref 26–34)
MCH RBC QN AUTO: 29.8 PG (ref 26–34)
MCHC RBC AUTO-ENTMCNC: 33.2 G/DL (ref 32–36)
MCHC RBC AUTO-ENTMCNC: 33.3 G/DL (ref 32–36)
MCV RBC AUTO: 89 FL (ref 80–100)
MCV RBC AUTO: 90 FL (ref 80–100)
MONOCYTES # BLD AUTO: 1.44 X10*3/UL (ref 0.1–1)
MONOCYTES NFR BLD AUTO: 14.6 %
NEUTROPHILS # BLD AUTO: 6.27 X10*3/UL (ref 1.2–7.7)
NEUTROPHILS NFR BLD AUTO: 63.7 %
NRBC BLD-RTO: 4.1 /100 WBCS (ref 0–0)
NRBC BLD-RTO: 6.6 /100 WBCS (ref 0–0)
PATH REVIEW-HGB IDENTIFICATION: ABNORMAL
PLATELET # BLD AUTO: 182 X10*3/UL (ref 150–450)
PLATELET # BLD AUTO: 272 X10*3/UL (ref 150–450)
POTASSIUM SERPL-SCNC: 4.2 MMOL/L (ref 3.5–5.3)
PROT SERPL-MCNC: 5.5 G/DL (ref 6.4–8.2)
PROTHROMBIN TIME: 14.9 SECONDS (ref 9.8–12.8)
RBC # BLD AUTO: 2.82 X10*6/UL (ref 4–5.2)
RBC # BLD AUTO: 3.22 X10*6/UL (ref 4–5.2)
RETICS #: 0.32 X10*6/UL (ref 0.02–0.08)
RETICS/RBC NFR AUTO: 11.4 % (ref 0.5–2)
SODIUM SERPL-SCNC: 138 MMOL/L (ref 136–145)
WBC # BLD AUTO: 7.8 X10*3/UL (ref 4.4–11.3)
WBC # BLD AUTO: 9.9 X10*3/UL (ref 4.4–11.3)

## 2024-10-08 PROCEDURE — 83020 HEMOGLOBIN ELECTROPHORESIS: CPT

## 2024-10-08 PROCEDURE — 2500000005 HC RX 250 GENERAL PHARMACY W/O HCPCS: Performed by: NURSE PRACTITIONER

## 2024-10-08 PROCEDURE — 2500000002 HC RX 250 W HCPCS SELF ADMINISTERED DRUGS (ALT 637 FOR MEDICARE OP, ALT 636 FOR OP/ED): Performed by: NURSE PRACTITIONER

## 2024-10-08 PROCEDURE — P9016 RBC LEUKOCYTES REDUCED: HCPCS

## 2024-10-08 PROCEDURE — 2500000001 HC RX 250 WO HCPCS SELF ADMINISTERED DRUGS (ALT 637 FOR MEDICARE OP)

## 2024-10-08 PROCEDURE — 73610 X-RAY EXAM OF ANKLE: CPT | Mod: LEFT SIDE | Performed by: STUDENT IN AN ORGANIZED HEALTH CARE EDUCATION/TRAINING PROGRAM

## 2024-10-08 PROCEDURE — 36415 COLL VENOUS BLD VENIPUNCTURE: CPT | Performed by: NURSE PRACTITIONER

## 2024-10-08 PROCEDURE — 2500000001 HC RX 250 WO HCPCS SELF ADMINISTERED DRUGS (ALT 637 FOR MEDICARE OP): Performed by: NURSE PRACTITIONER

## 2024-10-08 PROCEDURE — 2500000004 HC RX 250 GENERAL PHARMACY W/ HCPCS (ALT 636 FOR OP/ED): Mod: JZ

## 2024-10-08 PROCEDURE — 85027 COMPLETE CBC AUTOMATED: CPT

## 2024-10-08 PROCEDURE — 2500000005 HC RX 250 GENERAL PHARMACY W/O HCPCS

## 2024-10-08 PROCEDURE — 99233 SBSQ HOSP IP/OBS HIGH 50: CPT

## 2024-10-08 PROCEDURE — 82330 ASSAY OF CALCIUM: CPT

## 2024-10-08 PROCEDURE — 73610 X-RAY EXAM OF ANKLE: CPT | Mod: LT

## 2024-10-08 PROCEDURE — 80053 COMPREHEN METABOLIC PANEL: CPT | Performed by: NURSE PRACTITIONER

## 2024-10-08 PROCEDURE — 85610 PROTHROMBIN TIME: CPT | Performed by: NURSE PRACTITIONER

## 2024-10-08 PROCEDURE — 36415 COLL VENOUS BLD VENIPUNCTURE: CPT

## 2024-10-08 PROCEDURE — 36512 APHERESIS RBC: CPT | Performed by: STUDENT IN AN ORGANIZED HEALTH CARE EDUCATION/TRAINING PROGRAM

## 2024-10-08 PROCEDURE — 85025 COMPLETE CBC W/AUTO DIFF WBC: CPT | Performed by: NURSE PRACTITIONER

## 2024-10-08 PROCEDURE — 83735 ASSAY OF MAGNESIUM: CPT | Performed by: NURSE PRACTITIONER

## 2024-10-08 PROCEDURE — 36512 APHERESIS RBC: CPT

## 2024-10-08 PROCEDURE — 1170000001 HC PRIVATE ONCOLOGY ROOM DAILY

## 2024-10-08 PROCEDURE — 2500000004 HC RX 250 GENERAL PHARMACY W/ HCPCS (ALT 636 FOR OP/ED): Performed by: NURSE PRACTITIONER

## 2024-10-08 PROCEDURE — 83021 HEMOGLOBIN CHROMOTOGRAPHY: CPT

## 2024-10-08 PROCEDURE — 85045 AUTOMATED RETICULOCYTE COUNT: CPT

## 2024-10-08 PROCEDURE — 83615 LACTATE (LD) (LDH) ENZYME: CPT

## 2024-10-08 RX ORDER — DIPHENHYDRAMINE HYDROCHLORIDE 50 MG/ML
25 INJECTION INTRAMUSCULAR; INTRAVENOUS EVERY 5 MIN PRN
Status: DISCONTINUED | OUTPATIENT
Start: 2024-10-08 | End: 2024-10-08 | Stop reason: HOSPADM

## 2024-10-08 RX ORDER — DIPHENHYDRAMINE HCL 25 MG
25 CAPSULE ORAL ONCE
Status: COMPLETED | OUTPATIENT
Start: 2024-10-08 | End: 2024-10-08

## 2024-10-08 RX ORDER — HEPARIN SODIUM 1000 [USP'U]/ML
1000 INJECTION, SOLUTION INTRAVENOUS; SUBCUTANEOUS ONCE
Status: COMPLETED | OUTPATIENT
Start: 2024-10-08 | End: 2024-10-08

## 2024-10-08 RX ORDER — ACETAMINOPHEN 325 MG/1
650 TABLET ORAL ONCE
Status: COMPLETED | OUTPATIENT
Start: 2024-10-08 | End: 2024-10-08

## 2024-10-08 RX ORDER — CALCIUM CARBONATE 200(500)MG
1500 TABLET,CHEWABLE ORAL EVERY 5 MIN PRN
Status: DISCONTINUED | OUTPATIENT
Start: 2024-10-08 | End: 2024-10-08 | Stop reason: HOSPADM

## 2024-10-08 RX ORDER — CALCIUM GLUCONATE 20 MG/ML
1 INJECTION, SOLUTION INTRAVENOUS EVERY 4 HOURS
Status: COMPLETED | OUTPATIENT
Start: 2024-10-08 | End: 2024-10-08

## 2024-10-08 ASSESSMENT — PAIN SCALES - GENERAL
PAINLEVEL_OUTOF10: 6
PAINLEVEL_OUTOF10: 8
PAINLEVEL_OUTOF10: 10 - WORST POSSIBLE PAIN
PAINLEVEL_OUTOF10: 6

## 2024-10-08 ASSESSMENT — COGNITIVE AND FUNCTIONAL STATUS - GENERAL
DRESSING REGULAR LOWER BODY CLOTHING: A LOT
MOVING TO AND FROM BED TO CHAIR: A LITTLE
HELP NEEDED FOR BATHING: A LITTLE
TOILETING: A LITTLE
DRESSING REGULAR LOWER BODY CLOTHING: A LOT
MOVING TO AND FROM BED TO CHAIR: A LITTLE
WALKING IN HOSPITAL ROOM: A LOT
STANDING UP FROM CHAIR USING ARMS: A LOT
DAILY ACTIVITIY SCORE: 19
STANDING UP FROM CHAIR USING ARMS: A LOT
WALKING IN HOSPITAL ROOM: A LOT
CLIMB 3 TO 5 STEPS WITH RAILING: A LOT
DRESSING REGULAR UPPER BODY CLOTHING: A LITTLE
MOBILITY SCORE: 17
TOILETING: A LITTLE
DRESSING REGULAR UPPER BODY CLOTHING: A LITTLE
HELP NEEDED FOR BATHING: A LITTLE
DAILY ACTIVITIY SCORE: 19

## 2024-10-08 ASSESSMENT — PAIN - FUNCTIONAL ASSESSMENT
PAIN_FUNCTIONAL_ASSESSMENT: 0-10
PAIN_FUNCTIONAL_ASSESSMENT: 0-10

## 2024-10-08 NOTE — POST-PROCEDURE NOTE
This is a 54 y.o. female with Sickle Cell Disease and multiorgan dysfunction who underwent automated red blood cell exchange (RCE) with 6 RBC units with target HgbS 29% and target HCT 28%.     I saw and evaluated the patient during the RCE.  The patient was resting in bed.  Vascular access functioned well.    WBC   Date/Time Value Ref Range Status   10/08/2024 06:08 AM 9.9 4.4 - 11.3 x10*3/uL Final     Hemoglobin   Date/Time Value Ref Range Status   10/08/2024 06:08 AM 8.4 (L) 12.0 - 16.0 g/dL Final     Hematocrit   Date/Time Value Ref Range Status   10/08/2024 06:08 AM 25.2 (L) 36.0 - 46.0 % Final     Platelets   Date/Time Value Ref Range Status   10/08/2024 06:08  150 - 450 x10*3/uL Final        POCT Calcium, Ionized   Date/Time Value Ref Range Status   10/07/2024 12:42 PM 1.16 1.1 - 1.33 mmol/L Final     Comment:     The performance characteristics of ionized calcium tested  in heparinized plasma or serum have been validated by the  individual  laboratory site where testing is performed.   Testing on heparinized plasma or serum is not approved by   the FDA; however, such approval is not necessary.        Hemoglobin S   Date/Time Value Ref Range Status   10/07/2024 06:44 AM 32.2 (H) <=0.0 % Final        Ferritin   Date/Time Value Ref Range Status   05/08/2024 09:18 AM 31 8 - 150 ng/mL Final        Pre-procedure vital signs at 1024:  T: 37.6 C, HR: 80, RR: 18, BP: 96/57  Pulse oximetry: 98% on room air    Post-procedure vital signs at 1224:  T: 37.4 C, HR: 85, RR: 18, BP: 109/62  Pulse oximetry: 98% on room air     Outcome: RCE completed.  Adverse Reaction: No    Assessment and Plan:  54 y.o. female with Sickle Cell Disease and multiorgan dysfunction who underwent RCE.    Draw post-procedure labs.  Vascular access to be managed by the clinical team.  No further RCE planned for this admission.

## 2024-10-08 NOTE — CARE PLAN
The patient's goals for the shift include      The clinical goals for the shift include pt will remain HDS and Vss throughout shift      Problem: Pain - Adult  Goal: Verbalizes/displays adequate comfort level or baseline comfort level  Outcome: Progressing     Problem: Safety - Adult  Goal: Free from fall injury  Outcome: Progressing     Problem: Skin  Goal: Decreased wound size/increased tissue granulation at next dressing change  Outcome: Progressing  Goal: Participates in plan/prevention/treatment measures  Outcome: Progressing  Goal: Prevent/manage excess moisture  Outcome: Progressing  Goal: Prevent/minimize sheer/friction injuries  Outcome: Progressing  Goal: Promote/optimize nutrition  Outcome: Progressing  Goal: Promote skin healing  Outcome: Progressing     Problem: Knowledge Deficit  Goal: Patient/family/caregiver demonstrates understanding of disease process, treatment plan, medications, and discharge instructions  Outcome: Progressing     Problem: Mechanical Ventilation  Goal: Patient Will Maintain Patent Airway  Outcome: Progressing  Goal: Oral health is maintained or improved  Outcome: Progressing  Goal: Tracheostomy will be managed safely  Outcome: Progressing  Goal: ET tube will be managed safely  Outcome: Progressing  Goal: Ability to express needs and understand communication  Outcome: Progressing  Goal: Mobility/activity is maintained at optimum level for patient  Outcome: Progressing     Problem: Pain  Goal: Takes deep breaths with improved pain control throughout the shift  Outcome: Progressing  Goal: Turns in bed with improved pain control throughout the shift  Outcome: Progressing  Goal: Walks with improved pain control throughout the shift  Outcome: Progressing  Goal: Performs ADL's with improved pain control throughout shift  Outcome: Progressing  Goal: Participates in PT with improved pain control throughout the shift  Outcome: Progressing  Goal: Free from opioid side effects throughout the  shift  Outcome: Progressing  Goal: Free from acute confusion related to pain meds throughout the shift  Outcome: Progressing     Problem: Nutrition  Goal: Less than 5 days NPO/clear liquids  Outcome: Progressing  Goal: Oral intake greater than 50%  Outcome: Progressing  Goal: Oral intake greater 75%  Outcome: Progressing  Goal: Consume prescribed supplement  Outcome: Progressing  Goal: Adequate PO fluid intake  Outcome: Progressing  Goal: Nutrition support goals are met within 48 hrs  Outcome: Progressing  Goal: Nutrition support is meeting 75% of nutrient needs  Outcome: Progressing  Goal: Tube feed tolerance  Outcome: Progressing  Goal: BG  mg/dL  Outcome: Progressing  Goal: Lab values WNL  Outcome: Progressing  Goal: Electrolytes WNL  Outcome: Progressing  Goal: Promote healing  Outcome: Progressing  Goal: Maintain stable weight  Outcome: Progressing  Goal: Reduce weight from edema/fluid  Outcome: Progressing  Goal: Gradual weight gain  Outcome: Progressing  Goal: Improve ostomy output  Outcome: Progressing     Problem: Fall/Injury  Goal: Not fall by end of shift  Outcome: Progressing  Goal: Be free from injury by end of the shift  Outcome: Progressing  Goal: Verbalize understanding of personal risk factors for fall in the hospital  Outcome: Progressing  Goal: Verbalize understanding of risk factor reduction measures to prevent injury from fall in the home  Outcome: Progressing  Goal: Use assistive devices by end of the shift  Outcome: Progressing  Goal: Pace activities to prevent fatigue by end of the shift  Outcome: Progressing

## 2024-10-08 NOTE — ASSESSMENT & PLAN NOTE
Senait Narvaez is a 54 year-old female with a PMHx of Hgb SC disease (previously on exchange transfusions q4-6 weeks, last transfusion 3/1/24), hx of SVC syndrome, recurrent DVT/PE (on lifelong Coumadin), pHTN (6/2023), cauda equina with saddle numbness, hx SVT, fibromyalgia, Raynaud's disease, CKD II (baseline SCr~<2) and CAN who presented to [facility] ED for diffuse pain and lethargy, then transferred to  MICU for hemodynamic instability. Patient was subsequently intubated due to worsening lethargic and lack of respiratory compensation from significant metabolic acidosis with concern for ACS. Vancomycin and Zosyn started for empiric coverage, patient also started on pressors for BP support. Patient was transfused 2 units pRBCs and then underwent RBCEx on 9/11. Cardiology consulted for c/f NSTEMI vs demand ischemia (ST depressions and elevated troponin), rec treat as ACS. Course further c/b severe CHARLES with peak sCr of 5.4, and acute liver injury with severely elevated liver enzymes (ALT 4119, AST >10k). Liver US only remarkable for fatty infiltration, viral hepatitis panel negative. CHARLES and liver injury improved with supportive management. Pruritic rash noted 9/14, Derm consulted and took multiple biopsies 9/15. Rash then began to worsen to involve the groin, lateral thigh, and scalp with numerous tense bullae. Discontinued heparin given concerns for potential HIT, started bivalirudin. However PF4 was negative. Patient was extubated on 9/16/2024. Started stress dose steroids given continued pressor requirements, pressors Dc'd 9/17.     Patient transferred to [facility] 9/17, PT/OT rec SNF. Coumadin bridge was started 9/18. Given persistent rash with unremarkable labs, derm re-biopsied on 9/18, findings ultimately felt to be most c/w fixed drug eruption. Dermatology contacted for bx results (9/20) rec triamcinolone cream BID and interdry cloths. Leukocytosis uptrending 9/21, repeat infectious workup negative (CXR, Abd XR, blood  cultures, MRSA negative 9/21). UA with leuks. On 9/25, per nursing and patient, rash seems to be worsening. Derm re-engaged recommended to apply vasoline to all eroded/denuded areas and continue triamcinolone cream to both the black plaques as well as the red areas across the trunk and thighs. 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 Wound care following. For warfarin dosing, per pharmacy, if INR between 2-3 can restart home warfarin 5mg daily, if INR < 2 restart bival. INR 3.4 on 9/25, warfarin stopped, repeat INR 2.8 9/26 and warfarin restarted. On 9/26, increased PO oxycodone and discontinued IV dilaudid in anticipation for discharge. Overnight on 9/29, brain attack called out of concern for change in mental status, no focal deficits noted, BAT cancelled and narcan administered x3 with improvement of mental status. Mentation improved as of 10/2, small dose oxy resumed. INR supratherapeutic 10/3 at 6.7, 5mg IV Vit K x1 given (ok per pharmacy) I/s/o ?bleeding from vagina vs wound excoriation and UA with +blood. INR  down to 1.5 (10/4). Plan for RBCEx prior to dc per Dr. Whaley, planned 10/8. 10/5 ordered duplex BLE as pt c/o severe LLE pain and swelling which was negative. 10/5 started heparin drip (per attending) given normal INR while pending apheresis line placement (previous HIT workup with negative PF4). 10/7 started bridge back to coumadin with lovenox after apheresis line. Continue therapeutic Lovenox 120mg BID with Coumadin 5mg nightly x5 days and until INR is in therapeutic range x24 hours. DC to SNF pending improvement in pain, skin, INR/coumadin bridge, and RBCEx (likely end of this week d/t bridging time).     # Waxing and waning AMS-- resolved   - Felt to be due to opioids i/s/o worsening renal function and c/b respiratory acidosis  - All opioids on hold (9/28-10/2)  - 10/2 resumed oxycodone 10mg q3  hours as AMS now resolved   - continue to hold extended release oxycontin   - Keep supplemental O2 on board, but titrate to maintain SPO2 of 90-92% at most  - On CPAP at home, enforce nightly use to extent possible; may need BiPAP if unresolved     # CHARLES on CKD II, improving  - Baseline ~ SCr <2, peak 5.4 this admission. 9/21 sCr 1.58 --> 1.13 (9/25)--> 1.14 (10/3)--> 0.96 (10/6)--> 0.99 (10/7) --> 0.97 (10/8)  - Likely pre-renal vs ATN given persistant hypotension over admission, on CKD II, now polyuric phase  - FeNA 0.4%  - Severe hyperkalemia with previous peaked T waves on EKG, resolved  - Nephrology following  - s/p multiple K cocktails in MICU  - Encourage increased PO intake     # Purpuric rash, suspected fixed drug eruption  # Concern for possible heparin-induced thrombocytopenia (HIT) -resolved  - Purple, reticular pattern of plaques noted around the bilateral breasts, scalp and groin,  with tense bullae. Non-erythematous, no purulence  - Some initial concern for possible heparin-induced thrombocytopenia with concurrent skin findings. Discontinued heparin 9/16 and transitioned to bivalirudin  - 4 T score 0, PF4 w/ reflex BINA negative. Hematology ultimately consulted, felt there was low concern for HIT and signed off  - Differential diagnosis includes purpura secondary to infection versus coagulopathy versus vasculopathy versus small and/or medium vessel vasculitis versus calciphylaxis vs drug rash  - Dermatology consulted, work-up to date:  - Low Protein C activity  - ANCA, PR3 and MPO negative  - ISABELLE neg  - cryoglobulin labs- not enough specimen to analyze  - S/p skin punch biopsies x 2 9/15 and one on 9/18, showed SUPERFICIAL EPIDERMAL DEGENERATION WITH ERYTHROCYTE EXTRAVASATION WITH NEUTROPHILS. These findings could be seen secondary to an older trauma, or resolving erythema multiforme, a fixed drug eruption, Edgar-Christopher syndrome, or toxic epidermal necrolysis. Favor Multifocal Fixed Drug Eruption     - Dermatology contacted (9/20) about results, recommended adding vancomycin to allergy list and starting triamcinolone  - Continue triamcinolone 0.1% cream BID (9/20-current) per derm recs  - On 9/25, concern from pt and nursing that rash is not improving. Under breasts and groin still causing pain. Bilateral upper thighs and forehead rash improving   - Wound care re-consulted 9/25, s/o 10/3 as derm gave recs below (wound pictures documented in WC note from 10/3)  - Re-engaged dermatology on 9/25 given worsening rash; recommended to apply vasoline to all eroded/denuded areas and continue triamcinolone cream to both the black plaques as well as the red areas across the trunk and thighs. Per dermatology, erythema is mild and not palpable, it is most likely part of the drug eruption the patient has previously been known to have     # Leukocytosis-- resolved   - Likely I/s/o rash vs reactive vs infectious process  - Leukocytosis noted on admission to MICU (WBC 24.8) --> 12.7 (9/25) --> 8.3 (10/3)  - Leukocytosis improved throughout hospital stay however started uptrending 9/20; now improving on 9/25   - Blood cultures 9/10, 9/12, 9/15 all NGTD  - Strep and Legionella Ag, MRSA nares negative  - CXR (9/21) largely unremarkable, again re-demonstrated perihilar prominence seen on prior XR  - Abdominal xray (9/21) unremarkable  - Repeat blood cultures x 2 (9/21) NGTD  - UA (9/21) with 500 LE  - UA (9/30) +bacteria, +leuks, urine culture (9/29) mult organisms  - UA sent 10/3 with +blood, leuks, and WBC- not reflex to cx so UCX added on to 10/3 UA and again showed multiple organisms  - Will hold off tx for UTI for now as pt asymptomatic and leukocytosis improved. 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  # ACS  - Previously managed through routine RBC exchanges q6 weeks, most recent RBCEx 3/1/24, per patient pausing on transfusions for time being  - OARRS reviewed, no aberrant behavior noted  -  LDH>12,000 (now downtrending), haptoglobin <30, fibrinogen 318, retic % 5.2 on presentation, bili 10.7  - Leukocytosis 24.8 on admission to MICU  - CXR (9/10) persistent atelectasis/scarring in the left lower lung  - CT CAP (9/11) Diffuse mosaic attenuation of the lung parenchyma, which can be seen in the setting of small airway disease, pulmonary edema or acute infection  - Intubated upon arrival to MICU, now s/p extubation 9/16  - s/p Vanc and Zosyn (finished both courses prior to floor transfer)  - Procal elevated 7.09 (9/11)  - Blood cultures 9/10, 9/12, 9/15 all NGTD  - Strep and Legionella Ag, MRSA nares negative  - Trop peak 1431, cards rec treat as ACS (see below)  - S/p 2u pRBCs 9/10 on arrival to MICU  - S/p exchange transfusion 9/11 AM for ACS  - Care plan from 12/6/23- For mild to moderate pain: Dilaudid 0.5mg IVP q3h as needed, for moderate to severe pain Dilaudid 1- 1.5mg q3h PRN  - On arrival to [facility], started on 0.6mg dilaudid IVP q3h PRN severe pain   - s/p 0.6mg dilaudid IVP q4h PRN breakthrough and 15mg PO oxycodone Q4 hrs severe pain (9/25)  - Per discussion with Dr. Whaley (9/25), okay to increase PO oxycodone to 15-20mg Q4hr to optimize pain for discharge to SNF.   - On 9/26, discontinued IV dilaudid and increased to 20mg PO oxycodone q4hrs for severe pain   - Opioids on hold as of 9/28 d/t AMS- resumed 2.5mg oxy q4hrs PRN severe pain on 10/2. ER oxy 10mg BID still held  - (10/5- current) increased pain reg as AMS resolved--> continue oxycodone 10mg q3hrs PRN severe pain  - Continue to hold ER Oxycontin indefinitely as was major contributor to AMS (was started in ICU and was not on this prior to admission)  - Bowel regimen for opioid induced constipation with Senna 2tabs BID, Miralax BID, and dulcolax suppository 10mg PRN constipation  - Zofran for opioid induced nausea, no benadryl ordered (assumed from previous AMS but also no reports of opioid-induced pruritus)  - Continue home Folic Acid 1mg  daily, continue thiamine 100mg daily     # ST depression with tropinemia, suspected 2/2 demand ischemia iso ACS  # Hx SVT  - Elevated troponins on admission, peak 1431 and now downtrending   - EKG with ST depressions, likely Type II MI due to vaso-occlusive crisis and anemia  - TTE 9/11: EF 60-65%, RV enlarged and reduced in function which appears stable when compared to last TTE (1/2024)  - Troponin leak is likely due to cardiac demand vs supply mismatch in the setting of worsening anemia and vaso-occlusive crisis  - Cardiology consulted, now signed off with indication to treat troponemia as ACS  - Lasix held in MICU 2/2 hypotension and CHARLES, can resume as able   - Metop 12.5mg BID held in MICU 2/2 hypotension- BP and HR continue to remain stable, holding off on restarting for now  - Follow-up with Cardiology outpt, FUV 2/13/25     # Acute liver injury, improving  # Direct hyperbilirubinuria, improving  - Suspected 2/2 hemodynamic instability versus sickle cell crisis  - On presentation to  MICU ALT 2611, AST 4727, T bili 10.2, INR 3.0; continues to improve  - CT with hepatic venous congestion, patient lacks gallbladder  - Liver US with Doppler: Diffuse fatty infiltration, unremarkable doppler interrogation of the liver  - EBV IgG positive, IgM negative  - CMV IgG positive, PCR negative  - Acute hepatitis panel unremarkable  - Hepatology initially following, now signed off  - Will continue to monitor, trend daily CMP     # pHTN   - Initial c/f CTEPH as imaging findings with dilated PA, filling defects, and mosaicism  - VQ scan (6/13/23) with non anatomical basal defects and RUL defect due to scar--> not favoring CTEPH per Dr. Yi and Dr. 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 On lifelong anticoagulation, DOAC discouraged due to high risk of clotting   - Home Coumadin 5mg daily initially HELD on admission to MICU 2/2 unstable course  - Restarted coumadin 9/18 PM, will plan to bridge with bival, Ok'd by heme   - 9/20 INR 3.8, bival and home warfarin held. Pharmacy rec repeat INR 4 hours after bival was held. If the INR < 2, we can restart the bival, but if the INR 2-3, we can continue with warfarin monotherapy   - 9/20 repeat INR 3.7, pharmacy rec continuing to hold both medications  - 9/21 INR 3.2, per pharmacy - get repeat INR in AM 9/22, if INR between 2-3 can restart home warfarin 5mg daily  -INR 2.6 (9/24) after restarting Warfarin 5mg, will maintain INR goal of 2-3  - INR 3.4 (9/25). Held warfarin on 9/25. Repeat INR 9/26. Plan to restart if <3   - INR 2.8 (9/26), restarted warfarin, recheck INR 9/27   - INR 4.0 (9/30), held warfarin, repeat INR 10/1 ordered, plan to restart if <3  - INR 6.8 (10/1), patient without any active bleeding, per attending and pharmacy, warfarin held   - INR 6.8 (10/2), patient without any active bleeding, per attending and pharmacy, warfarin held   - INR 6.7 (10/3), cont to hold warfarin. Ordered 5mg IV Vit K x1 (ok per pharmacy) d/t ?bleeding, vaginal vs wound excoriation and UA +blood  - 10/4 INR normalized to 1.6, continue to hold warfarin  - 10/5 pt c/o severe LLE pain and swelling; duplex BLE negative  - 10/5 INR 1.2; started heparin drip given normal INR while pending apheresis line placement (previous HIT workup with negative PF4)--> held 10/7 at 0400 for apheresis line placement  - 10/7  INR 1.2--> started bridge back to coumadin tonight with lovenox. Started after apheresis line placement will start coumadin 5mg and start therapeutic lovenox 120mg BID x5 days AND until INR therepeutic x24 hours (regimen discussed with pharmacy)  - Follows with Coumadin clinic outpatient  - Plan to consult vascular medicine if INR is not therapeutic on home regimen      # CAN  - Continue home CPAP and home Albuterol PRN     # H/O Cauda Equine syndrome  - Follows Dr. Delacruz as outpatient  - No current issues      DISPO:  - Full code- confirmed on admission  - NOK: Tati Salgado (mother) (#[phone])  - PT/OT rec SNF, plan for rehab at [facility]  - DC to SNF pending improvement in pain, skin, INR/coumadin bridge, and RBCEx  - Derm FUV 11/7, Sickle Cell FUV 11/18, Cardiology FUV 2/13

## 2024-10-08 NOTE — PROGRESS NOTES
"Nutrition Follow Up Assessment:   Nutrition Assessment    The patient is a 54 y.o. female who is hospital day #28.  Since last RD visit:  - mentation improved. Now A&Ox4.   - working on pain management  - s/p routine RBC exchange on 10/08  - CHARLES improved. Nephrology signed off.   - Vitamin K given to improve INR. Hep drip started.     Nutrition History:  Energy Intake: Fair 50-75 % (Suspect closer to 75%)  Food and Nutrient History: Pt reports her PO intake and appetite varies. This is her baseline. Appetite changes based on pain level. Reports she is still in pain at times. Eating 1-2 times per day. Sometimes will eat all her meal sometimes she may just nibble on 1 meal. Snacks at times. Reports eating mostly oatmeal and soups. Thinks her PO intake is at her baseline. Pt states throat is no longer uncomfortable and able to eat normal foods again. Ensure Plus being sent BID - pt reports drinking intermittently. Not daily. Likes chocolate flavor. Reports today she ate 100% of moody, eggs, and cheese for breakfast. Unsure if she has had any wt changes during admission. Reviewed health touch, pt ordering 2-3 meals a day w/ meal sizes increased the past 3-4 days.    Date/Time Percent Meals Eaten (%)    10/08/24  Breakfast  100 per pt  = 790 kcal and 26g pro    10/06/24 1031 100 = 1000 kcal and 29g protein        Anthropometrics:  Start of admission anthropometrics:  Height: 165.1 cm (5' 5\")  Weight: 110 kg (242 lb 8.1 oz)  BMI (Calculated): 40.36    IBW/kg (Dietitian Calculated): 56.8 kg  Percent of IBW: 194 %       Date/Time Weight   10/05/24 113 kg (248 lb 7.3 oz)   09/17/24 0600 120 kg (264 lb 12.4 oz) - 5.8% x3 weeks.    09/16/24 0600 123 kg (270 lb 1 oz)   09/14/24 0835 122 kg (268 lb 15.4 oz)   09/13/24 0600 123 kg (271 lb 9.7 oz)   09/12/24 0500 123 kg (270 lb 8.1 oz)   09/10/24 2000 110 kg (242 lb 8.1 oz)       Weight Change %:  Weight History / % Weight Change: Wt decreased from 3 weeks ago (5.8% - " significant). However, current wt is closer to admit wt and reported UBW. Suspect wt change is mostly from fluids.    Nutrition Focused Physical Exam Findings:  No changes in NFPE results compared to NFPE from 09/11. No losses noted.   Subcutaneous Fat Loss:   Orbital Fat Pads: Well nourished (slightly bulging fat pads)  Buccal Fat Pads: Well nourished (full, rounded cheeks)  Triceps: Well nourished (ample fat tissue)  Muscle Wasting:  Temporalis: Well nourished (well-defined muscle)  Pectoralis (Clavicular Region): Well nourished (clavicle not visible)  Deltoid/Trapezius: Well nourished (rounded appearance at arm, shoulder, neck)  Interosseous: Well nourished (muscle bulges)  Quadriceps: Well nourished (well developed, well rounded)  Gastrocnemius: Well nourished (well developed bulbous muscle)  Edema:  Edema: none  Physical Findings:  Hair: Negative  Eyes: Negative  Mouth: Negative  Nails: Negative  Skin:  (wounds from rash)    Objective Data:    Last BM Date: 10/05/24    Nutrition Significant Labs:    Results from last 7 days   Lab Units 10/08/24  1225 10/08/24  0608 10/07/24  0644   HEMOGLOBIN g/dL 9.6* 8.4* 9.3*   MCV fL 90 89 89   GLUCOSE mg/dL  --  63* 61*   POTASSIUM mmol/L  --  4.2 4.0   SODIUM mmol/L  --  138 137   MAGNESIUM mg/dL  --  1.63 1.86   CREATININE mg/dL  --  0.97 0.99   BUN mg/dL  --  10 8   ALT U/L  --  19 19   AST U/L  --  29 37   ALK PHOS U/L  --  128* 134*       Nutrition Specific Medications:  calcium gluconate, 1 g, intravenous, q4h  folic acid, 1 mg, oral, Daily  pantoprazole, 40 mg, oral, Daily before breakfast  polyethylene glycol, 17 g, oral, BID  sennosides, 2 tablet, oral, BID  thiamine, 100 mg, oral, Daily  warfarin, 5 mg, oral, Daily    I/O:   No intake/output data recorded.    Dietary Orders (From admission, onward)       Start     Ordered    10/07/24 1117  Adult diet Regular  Diet effective now        Question:  Diet type  Answer:  Regular    10/07/24 1116    09/23/24 1449  Oral  nutritional supplements  Until discontinued        Question Answer Comment   Deliver with Breakfast chocolate - allow more if requested   Deliver with Lunch    Select supplement: Ensure Plus        09/23/24 1449                     Estimated Needs:   Total Energy Estimated Needs (kCal): 1704 kCal  Method for Estimating Needs: 30 kcal/kg IBW  MSJ = 1699    Total Protein Estimated Needs (g): 85 g  Method for Estimating Needs: 56.8kg x 1.5 g/kg    Method for Estimating Needs: 1 ml/kcal or per team          Nutrition Diagnosis        Nutrition Diagnosis  Diagnosis Status (2): Ongoing  Nutrition Diagnosis 2: Predicted inadequate nutrient intake  Related to (2): hospital course  As Evidenced by (2): prolonged intubation causing pain w/ swallowing and pt reporting only eating soft foods and liquids; now pt with AMS decreasing PO intake  Additional Assessment Information (2): PO intake appears improved in the past week w/ improvement in mentation. Suspect pt has been meeting ~75% of nutr needs the past 3-4 days. Will continue to monitor.       Nutrition Interventions/Recommendations    Individualized Nutrition Prescription Provided for : 1700 kcal and 85g protein via oral diet    Will continue with current nutrition plan of care: Ensure Plus (350 kcal,13g pro each) BID   Weekly weights     Nutrition Education:   Reviewed use of ONS and protein intake/sources.      Nutrition Monitoring and Evaluation   Monitoring and Evaluation Plan: Energy intake  Energy Intake: Estimated energy intake  Criteria: >75% of nutr needs    Monitoring and Evaluation Plan: Weight  Weight: Weight change  Criteria: no wt change                   Time Spent (min): 30 minutes

## 2024-10-08 NOTE — PROGRESS NOTES
"Senait Narvaez is a 54 y.o. female on day 28 of admission presenting with Sickle cell disease with crisis and other complication.    Subjective   Patient seen resting in bed. Reports feeling well this morning. 8/10 pain this morning in b/l LE. We discussed plan for RBCex today and continuing bridge to therepuetic coumadin dose, patient agreeable to plan. Did not wear CPAP overnight last night, encouraged importance of it. She otherwise denies shortness of breath, chest pain, abdominal pain, N/V/D, F/C.        Objective     Physical Exam  Constitutional:       Appearance: Normal appearance.   HENT:      Head: Normocephalic.      Nose: Nose normal.      Mouth/Throat:      Mouth: Mucous membranes are moist.   Eyes:      Pupils: Pupils are equal, round, and reactive to light.   Cardiovascular:      Rate and Rhythm: Normal rate and regular rhythm.      Pulses: Normal pulses.      Heart sounds: Normal heart sounds.   Pulmonary:      Effort: Pulmonary effort is normal.      Breath sounds: Normal breath sounds.      Comments: On 2L supplemental O2  Abdominal:      General: Abdomen is flat. Bowel sounds are normal.      Palpations: Abdomen is soft.   Skin:     General: Skin is warm.      Comments: Aphresis line in place in left groin   Neurological:      General: No focal deficit present.      Mental Status: She is alert.   Psychiatric:         Mood and Affect: Mood normal.         Last Recorded Vitals  Blood pressure 117/71, pulse 81, temperature 36.3 °C (97.3 °F), temperature source Temporal, resp. rate 18, height 1.651 m (5' 5\"), weight 113 kg (248 lb 7.3 oz), SpO2 97%.  Intake/Output last 3 Shifts:  I/O last 3 completed shifts:  In: 650 (5.8 mL/kg) [P.O.:650]  Out: 475 (4.2 mL/kg) [Urine:475 (0.1 mL/kg/hr)]  Weight: 112.7 kg     Relevant Results               This patient has a central line   Reason for the central line remaining today? Hemodynamic monitoring                 Assessment/Plan   Assessment & Plan  Sickle cell " disease with crisis and other complication  Senait Narvaez is a 54 year-old female with a PMHx of Hgb SC disease (previously on exchange transfusions q4-6 weeks, last transfusion 3/1/24), hx of SVC syndrome, recurrent DVT/PE (on lifelong Coumadin), pHTN (6/2023), cauda equina with saddle numbness, hx SVT, fibromyalgia, Raynaud's disease, CKD II (baseline SCr~<2) and CAN who presented to [facility] ED for diffuse pain and lethargy, then transferred to  MICU for hemodynamic instability. Patient was subsequently intubated due to worsening lethargic and lack of respiratory compensation from significant metabolic acidosis with concern for ACS. Vancomycin and Zosyn started for empiric coverage, patient also started on pressors for BP support. Patient was transfused 2 units pRBCs and then underwent RBCEx on 9/11. Cardiology consulted for c/f NSTEMI vs demand ischemia (ST depressions and elevated troponin), rec treat as ACS. Course further c/b severe CHARLES with peak sCr of 5.4, and acute liver injury with severely elevated liver enzymes (ALT 4119, AST >10k). Liver US only remarkable for fatty infiltration, viral hepatitis panel negative. CHARLES and liver injury improved with supportive management. Pruritic rash noted 9/14, Derm consulted and took multiple biopsies 9/15. Rash then began to worsen to involve the groin, lateral thigh, and scalp with numerous tense bullae. Discontinued heparin given concerns for potential HIT, started bivalirudin. However PF4 was negative. Patient was extubated on 9/16/2024. Started stress dose steroids given continued pressor requirements, pressors Dc'd 9/17.     Patient transferred to [facility] 9/17, PT/OT rec SNF. Coumadin bridge was started 9/18. Given persistent rash with unremarkable labs, derm re-biopsied on 9/18, findings ultimately felt to be most c/w fixed drug eruption. Dermatology contacted for bx results (9/20) rec triamcinolone cream BID and interdry cloths. Leukocytosis uptrending 9/21, repeat  infectious workup negative (CXR, Abd XR, blood cultures, MRSA negative 9/21). UA with leuks. On 9/25, per nursing and patient, rash seems to be worsening. Derm re-engaged recommended to apply vasoline to all eroded/denuded areas and continue triamcinolone cream to both the black plaques as well as the red areas across the trunk and thighs. 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 Wound care following. For warfarin dosing, per pharmacy, if INR between 2-3 can restart home warfarin 5mg daily, if INR < 2 restart bival. INR 3.4 on 9/25, warfarin stopped, repeat INR 2.8 9/26 and warfarin restarted. On 9/26, increased PO oxycodone and discontinued IV dilaudid in anticipation for discharge. Overnight on 9/29, brain attack called out of concern for change in mental status, no focal deficits noted, BAT cancelled and narcan administered x3 with improvement of mental status. Mentation improved as of 10/2, small dose oxy resumed. INR supratherapeutic 10/3 at 6.7, 5mg IV Vit K x1 given (ok per pharmacy) I/s/o ?bleeding from vagina vs wound excoriation and UA with +blood. INR  down to 1.5 (10/4). Plan for RBCEx prior to dc per Dr. Whaley, planned 10/8. 10/5 ordered duplex BLE as pt c/o severe LLE pain and swelling which was negative. 10/5 started heparin drip (per attending) given normal INR while pending apheresis line placement (previous HIT workup with negative PF4). 10/7 started bridge back to coumadin with lovenox after apheresis line. Continue therapeutic Lovenox 120mg BID with Coumadin 5mg nightly x5 days and until INR is in therapeutic range x24 hours. DC to SNF pending improvement in pain, skin, INR/coumadin bridge, and RBCEx (likely end of this week d/t bridging time).     # Waxing and waning AMS-- resolved   - Felt to be due to opioids i/s/o worsening renal function and c/b respiratory acidosis  - All opioids on hold  (9/28-10/2)  - 10/2 resumed oxycodone 10mg q3 hours as AMS now resolved   - continue to hold extended release oxycontin   - Keep supplemental O2 on board, but titrate to maintain SPO2 of 90-92% at most  - On CPAP at home, enforce nightly use to extent possible; may need BiPAP if unresolved     # CHARLES on CKD II, improving  - Baseline ~ SCr <2, peak 5.4 this admission. 9/21 sCr 1.58 --> 1.13 (9/25)--> 1.14 (10/3)--> 0.96 (10/6)--> 0.99 (10/7) --> 0.97 (10/8)  - Likely pre-renal vs ATN given persistant hypotension over admission, on CKD II, now polyuric phase  - FeNA 0.4%  - Severe hyperkalemia with previous peaked T waves on EKG, resolved  - Nephrology following  - s/p multiple K cocktails in MICU  - Encourage increased PO intake     # Purpuric rash, suspected fixed drug eruption  # Concern for possible heparin-induced thrombocytopenia (HIT) -resolved  - Purple, reticular pattern of plaques noted around the bilateral breasts, scalp and groin,  with tense bullae. Non-erythematous, no purulence  - Some initial concern for possible heparin-induced thrombocytopenia with concurrent skin findings. Discontinued heparin 9/16 and transitioned to bivalirudin  - 4 T score 0, PF4 w/ reflex BINA negative. Hematology ultimately consulted, felt there was low concern for HIT and signed off  - Differential diagnosis includes purpura secondary to infection versus coagulopathy versus vasculopathy versus small and/or medium vessel vasculitis versus calciphylaxis vs drug rash  - Dermatology consulted, work-up to date:  - Low Protein C activity  - ANCA, PR3 and MPO negative  - ISABELLE neg  - cryoglobulin labs- not enough specimen to analyze  - S/p skin punch biopsies x 2 9/15 and one on 9/18, showed SUPERFICIAL EPIDERMAL DEGENERATION WITH ERYTHROCYTE EXTRAVASATION WITH NEUTROPHILS. These findings could be seen secondary to an older trauma, or resolving erythema multiforme, a fixed drug eruption, Edgar-Christopher syndrome, or toxic epidermal  necrolysis. Favor Multifocal Fixed Drug Eruption    - Dermatology contacted (9/20) about results, recommended adding vancomycin to allergy list and starting triamcinolone  - Continue triamcinolone 0.1% cream BID (9/20-current) per derm recs  - On 9/25, concern from pt and nursing that rash is not improving. Under breasts and groin still causing pain. Bilateral upper thighs and forehead rash improving   - Wound care re-consulted 9/25, s/o 10/3 as derm gave recs below (wound pictures documented in WC note from 10/3)  - Re-engaged dermatology on 9/25 given worsening rash; recommended to apply vasoline to all eroded/denuded areas and continue triamcinolone cream to both the black plaques as well as the red areas across the trunk and thighs. Per dermatology, erythema is mild and not palpable, it is most likely part of the drug eruption the patient has previously been known to have     # Leukocytosis-- resolved   - Likely I/s/o rash vs reactive vs infectious process  - Leukocytosis noted on admission to MICU (WBC 24.8) --> 12.7 (9/25) --> 8.3 (10/3)  - Leukocytosis improved throughout hospital stay however started uptrending 9/20; now improving on 9/25   - Blood cultures 9/10, 9/12, 9/15 all NGTD  - Strep and Legionella Ag, MRSA nares negative  - CXR (9/21) largely unremarkable, again re-demonstrated perihilar prominence seen on prior XR  - Abdominal xray (9/21) unremarkable  - Repeat blood cultures x 2 (9/21) NGTD  - UA (9/21) with 500 LE  - UA (9/30) +bacteria, +leuks, urine culture (9/29) mult organisms  - UA sent 10/3 with +blood, leuks, and WBC- not reflex to cx so UCX added on to 10/3 UA and again showed multiple organisms  - Will hold off tx for UTI for now as pt asymptomatic and leukocytosis improved. 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  # ACS  - Previously managed through routine RBC exchanges q6 weeks, most recent RBCEx 3/1/24, per patient pausing on transfusions for time being  -  OARRS reviewed, no aberrant behavior noted  - LDH>12,000 (now downtrending), haptoglobin <30, fibrinogen 318, retic % 5.2 on presentation, bili 10.7  - Leukocytosis 24.8 on admission to MICU  - CXR (9/10) persistent atelectasis/scarring in the left lower lung  - CT CAP (9/11) Diffuse mosaic attenuation of the lung parenchyma, which can be seen in the setting of small airway disease, pulmonary edema or acute infection  - Intubated upon arrival to MICU, now s/p extubation 9/16  - s/p Vanc and Zosyn (finished both courses prior to floor transfer)  - Procal elevated 7.09 (9/11)  - Blood cultures 9/10, 9/12, 9/15 all NGTD  - Strep and Legionella Ag, MRSA nares negative  - Trop peak 1431, cards rec treat as ACS (see below)  - S/p 2u pRBCs 9/10 on arrival to MICU  - S/p exchange transfusion 9/11 AM for ACS  - Care plan from 12/6/23- For mild to moderate pain: Dilaudid 0.5mg IVP q3h as needed, for moderate to severe pain Dilaudid 1- 1.5mg q3h PRN  - On arrival to [facility], started on 0.6mg dilaudid IVP q3h PRN severe pain   - s/p 0.6mg dilaudid IVP q4h PRN breakthrough and 15mg PO oxycodone Q4 hrs severe pain (9/25)  - Per discussion with Dr. Whaley (9/25), okay to increase PO oxycodone to 15-20mg Q4hr to optimize pain for discharge to SNF.   - On 9/26, discontinued IV dilaudid and increased to 20mg PO oxycodone q4hrs for severe pain   - Opioids on hold as of 9/28 d/t AMS- resumed 2.5mg oxy q4hrs PRN severe pain on 10/2. ER oxy 10mg BID still held  - (10/5- current) increased pain reg as AMS resolved--> continue oxycodone 10mg q3hrs PRN severe pain  - Continue to hold ER Oxycontin indefinitely as was major contributor to AMS (was started in ICU and was not on this prior to admission)  - Bowel regimen for opioid induced constipation with Senna 2tabs BID, Miralax BID, and dulcolax suppository 10mg PRN constipation  - Zofran for opioid induced nausea, no benadryl ordered (assumed from previous AMS but also no reports of  opioid-induced pruritus)  - Continue home Folic Acid 1mg daily, continue thiamine 100mg daily     # ST depression with tropinemia, suspected 2/2 demand ischemia iso ACS  # Hx SVT  - Elevated troponins on admission, peak 1431 and now downtrending   - EKG with ST depressions, likely Type II MI due to vaso-occlusive crisis and anemia  - TTE 9/11: EF 60-65%, RV enlarged and reduced in function which appears stable when compared to last TTE (1/2024)  - Troponin leak is likely due to cardiac demand vs supply mismatch in the setting of worsening anemia and vaso-occlusive crisis  - Cardiology consulted, now signed off with indication to treat troponemia as ACS  - Lasix held in MICU 2/2 hypotension and CHARLES, can resume as able   - Metop 12.5mg BID held in MICU 2/2 hypotension- BP and HR continue to remain stable, holding off on restarting for now  - Follow-up with Cardiology outpt, FUV 2/13/25     # Acute liver injury, improving  # Direct hyperbilirubinuria, improving  - Suspected 2/2 hemodynamic instability versus sickle cell crisis  - On presentation to  MICU ALT 2611, AST 4727, T bili 10.2, INR 3.0; continues to improve  - CT with hepatic venous congestion, patient lacks gallbladder  - Liver US with Doppler: Diffuse fatty infiltration, unremarkable doppler interrogation of the liver  - EBV IgG positive, IgM negative  - CMV IgG positive, PCR negative  - Acute hepatitis panel unremarkable  - Hepatology initially following, now signed off  - Will continue to monitor, trend daily CMP     # pHTN   - Initial c/f CTEPH as imaging findings with dilated PA, filling defects, and mosaicism  - VQ scan (6/13/23) with non anatomical basal defects and RUL defect due to scar--> not favoring CTEPH per Dr. Yi and Dr. 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 On lifelong anticoagulation, DOAC discouraged due to high risk of clotting   - Home Coumadin 5mg daily initially HELD on admission to MICU 2/2 unstable course  - Restarted coumadin 9/18 PM, will plan to bridge with bival, Ok'd by heme   - 9/20 INR 3.8, bival and home warfarin held. Pharmacy rec repeat INR 4 hours after bival was held. If the INR < 2, we can restart the bival, but if the INR 2-3, we can continue with warfarin monotherapy   - 9/20 repeat INR 3.7, pharmacy rec continuing to hold both medications  - 9/21 INR 3.2, per pharmacy - get repeat INR in AM 9/22, if INR between 2-3 can restart home warfarin 5mg daily  -INR 2.6 (9/24) after restarting Warfarin 5mg, will maintain INR goal of 2-3  - INR 3.4 (9/25). Held warfarin on 9/25. Repeat INR 9/26. Plan to restart if <3   - INR 2.8 (9/26), restarted warfarin, recheck INR 9/27   - INR 4.0 (9/30), held warfarin, repeat INR 10/1 ordered, plan to restart if <3  - INR 6.8 (10/1), patient without any active bleeding, per attending and pharmacy, warfarin held   - INR 6.8 (10/2), patient without any active bleeding, per attending and pharmacy, warfarin held   - INR 6.7 (10/3), cont to hold warfarin. Ordered 5mg IV Vit K x1 (ok per pharmacy) d/t ?bleeding, vaginal vs wound excoriation and UA +blood  - 10/4 INR normalized to 1.6, continue to hold warfarin  - 10/5 pt c/o severe LLE pain and swelling; duplex BLE negative  - 10/5 INR 1.2; started heparin drip given normal INR while pending apheresis line placement (previous HIT workup with negative  PF4)--> held 10/7 at 0400 for apheresis line placement  - 10/7 INR 1.2--> started bridge back to coumadin tonight with lovenox. Started after apheresis line placement will start coumadin 5mg and start therapeutic lovenox 120mg BID x5 days AND until INR therepeutic x24 hours (regimen discussed with pharmacy)  - Follows with Coumadin clinic outpatient  - Plan to consult vascular medicine if INR is not therapeutic on home regimen      # CAN  - Continue home CPAP and home Albuterol PRN     # H/O Cauda Equine syndrome  - Follows Dr. Delacruz as outpatient  - No current issues      DISPO:  - Full code- confirmed on admission  - NOK: Tati Salgado (mother) (#[phone])  - PT/OT rec SNF, plan [facility]  - DC to SNF pending improvement in pain, skin, INR/coumadin bridge, and RBCEx  - Derm FUV 11/7, Sickle Cell FUV 11/18, Cardiology FUV 2/13         I spent 60 minutes in the professional and overall care of this patient.    Assessment and plan as above discussed with attending physician, Dr. Tayo Ferrell PA-C

## 2024-10-08 NOTE — PROGRESS NOTES
Senait [place] [id number]       Procedure type: Red cell Exchange    Replacement Fluids:  6units of PRBC used for procedure (RBCX)     Procedure Completed Without complications.    Attending notified of completion status. See flow sheet(s) for additional details.  Post-Vitals listed below.    Post-procedure vitals:  Vitals  BP: 109/62  Temp: 37.4 °C (99.3 °F)  Heart Rate: 85  Resp: 18  SpO2: 98 % on 2.5L O2       LEO NOLASCO RN

## 2024-10-09 LAB
ALBUMIN SERPL BCP-MCNC: 2.3 G/DL (ref 3.4–5)
ALP SERPL-CCNC: 125 U/L (ref 33–110)
ALT SERPL W P-5'-P-CCNC: 18 U/L (ref 7–45)
ANION GAP SERPL CALC-SCNC: 10 MMOL/L (ref 10–20)
AST SERPL W P-5'-P-CCNC: 30 U/L (ref 9–39)
BASOPHILS # BLD AUTO: 0.05 X10*3/UL (ref 0–0.1)
BASOPHILS NFR BLD AUTO: 0.4 %
BILIRUB SERPL-MCNC: 2.3 MG/DL (ref 0–1.2)
BUN SERPL-MCNC: 13 MG/DL (ref 6–23)
CA-I BLD-SCNC: 1.2 MMOL/L (ref 1.1–1.33)
CALCIUM SERPL-MCNC: 8.3 MG/DL (ref 8.6–10.6)
CHLORIDE SERPL-SCNC: 97 MMOL/L (ref 98–107)
CO2 SERPL-SCNC: 33 MMOL/L (ref 21–32)
CREAT SERPL-MCNC: 1.17 MG/DL (ref 0.5–1.05)
EGFRCR SERPLBLD CKD-EPI 2021: 55 ML/MIN/1.73M*2
EOSINOPHIL # BLD AUTO: 0.06 X10*3/UL (ref 0–0.7)
EOSINOPHIL NFR BLD AUTO: 0.5 %
ERYTHROCYTE [DISTWIDTH] IN BLOOD BY AUTOMATED COUNT: 17.5 % (ref 11.5–14.5)
GLUCOSE SERPL-MCNC: 74 MG/DL (ref 74–99)
HCT VFR BLD AUTO: 26.1 % (ref 36–46)
HEMOGLOBIN A2: 2.7 % (ref 2–3.5)
HEMOGLOBIN A2: 2.8 % (ref 2–3.5)
HEMOGLOBIN A: 69.5 % (ref 95.8–98)
HEMOGLOBIN A: 71.2 % (ref 95.8–98)
HEMOGLOBIN C: 13.3 %
HEMOGLOBIN C: 13.9 %
HEMOGLOBIN F: 0.4 % (ref 0–2)
HEMOGLOBIN F: 0.4 % (ref 0–2)
HEMOGLOBIN IDENTIFICATION INTERPRETATION: ABNORMAL
HEMOGLOBIN IDENTIFICATION INTERPRETATION: ABNORMAL
HEMOGLOBIN S: 12.4 %
HEMOGLOBIN S: 13.4 %
HGB BLD-MCNC: 8.8 G/DL (ref 12–16)
HGB RETIC QN: 31 PG (ref 28–38)
IMM GRANULOCYTES # BLD AUTO: 0.08 X10*3/UL (ref 0–0.7)
IMM GRANULOCYTES NFR BLD AUTO: 0.7 % (ref 0–0.9)
IMMATURE RETIC FRACTION: 21.5 %
INR PPP: 1.5 (ref 0.9–1.1)
LDH SERPL L TO P-CCNC: 237 U/L (ref 84–246)
LYMPHOCYTES # BLD AUTO: 2.35 X10*3/UL (ref 1.2–4.8)
LYMPHOCYTES NFR BLD AUTO: 19.6 %
MAGNESIUM SERPL-MCNC: 1.56 MG/DL (ref 1.6–2.4)
MCH RBC QN AUTO: 29.7 PG (ref 26–34)
MCHC RBC AUTO-ENTMCNC: 33.7 G/DL (ref 32–36)
MCV RBC AUTO: 88 FL (ref 80–100)
MONOCYTES # BLD AUTO: 1.9 X10*3/UL (ref 0.1–1)
MONOCYTES NFR BLD AUTO: 15.9 %
NEUTROPHILS # BLD AUTO: 7.53 X10*3/UL (ref 1.2–7.7)
NEUTROPHILS NFR BLD AUTO: 62.9 %
NRBC BLD-RTO: 3.8 /100 WBCS (ref 0–0)
PATH REVIEW-HGB IDENTIFICATION: ABNORMAL
PATH REVIEW-HGB IDENTIFICATION: ABNORMAL
PLATELET # BLD AUTO: 179 X10*3/UL (ref 150–450)
POTASSIUM SERPL-SCNC: 4.2 MMOL/L (ref 3.5–5.3)
PROT SERPL-MCNC: 5.4 G/DL (ref 6.4–8.2)
PROTHROMBIN TIME: 16.7 SECONDS (ref 9.8–12.8)
RBC # BLD AUTO: 2.96 X10*6/UL (ref 4–5.2)
RETICS #: 0.19 X10*6/UL (ref 0.02–0.08)
RETICS/RBC NFR AUTO: 6.4 % (ref 0.5–2)
SODIUM SERPL-SCNC: 136 MMOL/L (ref 136–145)
WBC # BLD AUTO: 12 X10*3/UL (ref 4.4–11.3)

## 2024-10-09 PROCEDURE — 84075 ASSAY ALKALINE PHOSPHATASE: CPT | Performed by: NURSE PRACTITIONER

## 2024-10-09 PROCEDURE — 85610 PROTHROMBIN TIME: CPT | Performed by: NURSE PRACTITIONER

## 2024-10-09 PROCEDURE — 94660 CPAP INITIATION&MGMT: CPT

## 2024-10-09 PROCEDURE — 82330 ASSAY OF CALCIUM: CPT

## 2024-10-09 PROCEDURE — 2500000005 HC RX 250 GENERAL PHARMACY W/O HCPCS: Performed by: NURSE PRACTITIONER

## 2024-10-09 PROCEDURE — 85025 COMPLETE CBC W/AUTO DIFF WBC: CPT | Performed by: NURSE PRACTITIONER

## 2024-10-09 PROCEDURE — 2500000004 HC RX 250 GENERAL PHARMACY W/ HCPCS (ALT 636 FOR OP/ED): Performed by: NURSE PRACTITIONER

## 2024-10-09 PROCEDURE — 83615 LACTATE (LD) (LDH) ENZYME: CPT

## 2024-10-09 PROCEDURE — 83735 ASSAY OF MAGNESIUM: CPT | Performed by: NURSE PRACTITIONER

## 2024-10-09 PROCEDURE — 83021 HEMOGLOBIN CHROMOTOGRAPHY: CPT

## 2024-10-09 PROCEDURE — 85045 AUTOMATED RETICULOCYTE COUNT: CPT

## 2024-10-09 PROCEDURE — 97110 THERAPEUTIC EXERCISES: CPT | Mod: GP

## 2024-10-09 PROCEDURE — 99233 SBSQ HOSP IP/OBS HIGH 50: CPT

## 2024-10-09 PROCEDURE — 1170000001 HC PRIVATE ONCOLOGY ROOM DAILY

## 2024-10-09 PROCEDURE — 2500000001 HC RX 250 WO HCPCS SELF ADMINISTERED DRUGS (ALT 637 FOR MEDICARE OP)

## 2024-10-09 PROCEDURE — 97530 THERAPEUTIC ACTIVITIES: CPT | Mod: GP

## 2024-10-09 PROCEDURE — 2500000004 HC RX 250 GENERAL PHARMACY W/ HCPCS (ALT 636 FOR OP/ED)

## 2024-10-09 PROCEDURE — 2500000001 HC RX 250 WO HCPCS SELF ADMINISTERED DRUGS (ALT 637 FOR MEDICARE OP): Performed by: NURSE PRACTITIONER

## 2024-10-09 PROCEDURE — 36415 COLL VENOUS BLD VENIPUNCTURE: CPT

## 2024-10-09 PROCEDURE — 2500000002 HC RX 250 W HCPCS SELF ADMINISTERED DRUGS (ALT 637 FOR MEDICARE OP, ALT 636 FOR OP/ED): Performed by: NURSE PRACTITIONER

## 2024-10-09 RX ORDER — MAGNESIUM SULFATE HEPTAHYDRATE 40 MG/ML
2 INJECTION, SOLUTION INTRAVENOUS ONCE
Status: DISCONTINUED | OUTPATIENT
Start: 2024-10-09 | End: 2024-10-09

## 2024-10-09 RX ORDER — FUROSEMIDE 20 MG/1
20 TABLET ORAL ONCE
Status: COMPLETED | OUTPATIENT
Start: 2024-10-09 | End: 2024-10-09

## 2024-10-09 RX ORDER — LANOLIN ALCOHOL/MO/W.PET/CERES
400 CREAM (GRAM) TOPICAL ONCE
Status: COMPLETED | OUTPATIENT
Start: 2024-10-09 | End: 2024-10-09

## 2024-10-09 ASSESSMENT — COGNITIVE AND FUNCTIONAL STATUS - GENERAL
MOVING TO AND FROM BED TO CHAIR: A LOT
MOBILITY SCORE: 13
MOVING FROM LYING ON BACK TO SITTING ON SIDE OF FLAT BED WITH BEDRAILS: A LITTLE
WALKING IN HOSPITAL ROOM: A LOT
TOILETING: A LITTLE
HELP NEEDED FOR BATHING: A LITTLE
DRESSING REGULAR LOWER BODY CLOTHING: A LITTLE
MOBILITY SCORE: 24
DRESSING REGULAR UPPER BODY CLOTHING: A LITTLE
DAILY ACTIVITIY SCORE: 20
TURNING FROM BACK TO SIDE WHILE IN FLAT BAD: A LITTLE
CLIMB 3 TO 5 STEPS WITH RAILING: TOTAL
STANDING UP FROM CHAIR USING ARMS: A LOT

## 2024-10-09 ASSESSMENT — PAIN SCALES - GENERAL
PAINLEVEL_OUTOF10: 9
PAINLEVEL_OUTOF10: 7
PAINLEVEL_OUTOF10: 8

## 2024-10-09 ASSESSMENT — PAIN - FUNCTIONAL ASSESSMENT
PAIN_FUNCTIONAL_ASSESSMENT: 0-10

## 2024-10-09 ASSESSMENT — PAIN DESCRIPTION - DESCRIPTORS: DESCRIPTORS: THROBBING;STABBING;SHOOTING;SHARP

## 2024-10-09 NOTE — PROGRESS NOTES
"Senait Narvaez is a 54 y.o. female on day 29 of admission presenting with Sickle cell disease with crisis and other complication.    Subjective   Patient seen resting in bed. Reports feeling well this morning, continues to endorse 8/10 pain in lower extremities. Reports routine RBCex went well yesterday. Denies any bleeding. Otherwise eating and drinking well, having bowel movements. We discussed plan to continue bridging coumadin and likely dc early next week to SNF, patient agreeable. Otherwise denies any shortness of breath, chest pain, abdominal pain, N/V/D, F/C, H/a.        Objective     Physical Exam  Constitutional:       Appearance: Normal appearance.   HENT:      Nose: Nose normal.      Mouth/Throat:      Mouth: Mucous membranes are moist.   Eyes:      Pupils: Pupils are equal, round, and reactive to light.   Cardiovascular:      Rate and Rhythm: Normal rate and regular rhythm.      Pulses: Normal pulses.      Heart sounds: Normal heart sounds.   Pulmonary:      Effort: Pulmonary effort is normal.      Breath sounds: Normal breath sounds.   Abdominal:      General: Abdomen is flat. Bowel sounds are normal.      Palpations: Abdomen is soft.   Musculoskeletal:         General: Normal range of motion.      Cervical back: Normal range of motion and neck supple.   Skin:     General: Skin is warm.   Neurological:      General: No focal deficit present.      Mental Status: She is alert.   Psychiatric:         Mood and Affect: Mood normal.         Last Recorded Vitals  Blood pressure 120/67, pulse 87, temperature 36.5 °C (97.7 °F), temperature source Temporal, resp. rate 18, height 1.651 m (5' 5\"), weight 113 kg (248 lb 7.3 oz), SpO2 99%.  Intake/Output last 3 Shifts:  I/O last 3 completed shifts:  In: 2102 (18.7 mL/kg) [Other:2052; IV Piggyback:50]  Out: 2747 (24.4 mL/kg) [Urine:700 (0.2 mL/kg/hr); Other:2047]  Weight: 112.7 kg     Relevant Results                 Scheduled medications  enoxaparin, 1 mg/kg, " subcutaneous, q12h  folic acid, 1 mg, oral, Daily  [Held by provider] metoprolol tartrate, 12.5 mg, oral, BID  nystatin, 1 Application, Topical, BID  oxygen, , inhalation, Continuous - Inhalation  pantoprazole, 40 mg, oral, Daily before breakfast  polyethylene glycol, 17 g, oral, BID  sennosides, 2 tablet, oral, BID  thiamine, 100 mg, oral, Daily  triamcinolone, , Topical, BID  warfarin, 5 mg, oral, Daily  white petrolatum, , Topical, BID      Continuous medications     PRN medications  PRN medications: albuterol, alteplase, bisacodyl, dextrose, dextrose, glucagon, glucagon, guaiFENesin, naloxone, ondansetron, oxyCODONE, oxygen, sodium chloride               Assessment/Plan   Assessment & Plan  Sickle cell disease with crisis and other complication    Senait Narvaez is a 54 year-old female with a PMHx of Hgb SC disease (previously on exchange transfusions q4-6 weeks, last transfusion 3/1/24), hx of SVC syndrome, 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 Patient was subsequently intubated due to worsening lethargic and lack of respiratory compensation from significant metabolic acidosis with concern for ACS. Vancomycin and Zosyn started for empiric coverage, patient also started on pressors for BP support. Patient was transfused 2 units pRBCs and then underwent RBCEx on 9/11. Cardiology consulted for c/f NSTEMI vs demand ischemia (ST depressions and elevated troponin), rec treat as ACS. Course further c/b severe CHARLES with peak sCr of 5.4, and acute liver injury with severely elevated liver enzymes (ALT 4119, AST >10k). Liver US only remarkable for fatty infiltration, viral hepatitis panel negative. CHARLES and liver injury improved with supportive management. Pruritic rash noted 9/14, Derm  consulted and took multiple biopsies 9/15. Rash then began to worsen to involve the groin, lateral thigh, and scalp with numerous tense bullae. Discontinued heparin given concerns for potential HIT, started bivalirudin. However PF4 was negative. Patient was extubated on 9/16/2024. Started stress dose steroids given continued pressor requirements, pressors Dc'd 9/17.      Patient transferred to [facility] 9/17, PT/OT rec SNF. Coumadin bridge was started 9/18. Given persistent rash with unremarkable labs, derm re-biopsied on 9/18, findings ultimately felt to be most c/w fixed drug eruption. Dermatology contacted for bx results (9/20) rec triamcinolone cream BID and interdry cloths. Leukocytosis uptrending 9/21, repeat infectious workup negative (CXR, Abd XR, blood cultures, MRSA negative 9/21). UA with leuks. On 9/25, per nursing and patient, rash seems to be worsening. Derm re-engaged recommended to apply vasoline to all eroded/denuded areas and continue triamcinolone cream to both the black plaques as well as the red areas across the trunk and thighs. 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 Wound care following. For warfarin dosing, per pharmacy, if INR between 2-3 can restart home warfarin 5mg daily, if INR < 2 restart bival. INR 3.4 on 9/25, warfarin stopped, repeat INR 2.8 9/26 and warfarin restarted. On 9/26, increased PO oxycodone and discontinued IV dilaudid in anticipation for discharge. Overnight on 9/29, brain attack called out of concern for change in mental status, no focal deficits noted, BAT cancelled and narcan administered x3 with improvement of mental status. Mentation improved as of 10/2, small dose oxy resumed. INR supratherapeutic 10/3 at 6.7, 5mg IV Vit K x1 given (ok per pharmacy) I/s/o ?bleeding from vagina vs wound excoriation and UA with +blood. INR  down to 1.5 (10/4). S/p routine RBCex 10/8. 10/5  ordered duplex BLE as pt c/o severe LLE pain and swelling which was negative. 10/5 started heparin drip (per attending) given normal INR while pending apheresis line placement (previous HIT workup with negative PF4). 10/7 started bridge back to coumadin with lovenox after apheresis line. Continue therapeutic Lovenox 120mg BID with Coumadin 5mg nightly x5 days and until INR is in therapeutic range x24 hours. DC to SNF pending improvement in pain, skin, INR/coumadin bridge, and RBCEx (likely beginning of next week d/t bridging time).     # Waxing and waning AMS-- resolved   - Felt to be due to opioids i/s/o worsening renal function and c/b respiratory acidosis  - All opioids on hold (9/28-10/2)  - 10/2 resumed oxycodone 10mg q3 hours as AMS now resolved   - continue to hold extended release oxycontin   - Keep supplemental O2 on board, but titrate to maintain SPO2 of 90-92% at most  - On CPAP at home, enforce nightly use to extent possible; may need BiPAP if unresolved     # CHARLES on CKD II, improving  - Baseline ~ SCr <2, peak 5.4 this admission. 9/21 sCr 1.58 --> 1.13 (9/25)--> 1.14 (10/3)--> 0.96 (10/6)--> 0.99 (10/7) --> 0.97 (10/8) --> 1.77 (10/9)   - Likely pre-renal vs ATN given persistant hypotension over admission, on CKD II, now polyuric phase  - FeNA 0.4%  - Severe hyperkalemia with previous peaked T waves on EKG, resolved  - Nephrology initially followed pt, now signed off   - s/p multiple K cocktails in MICU  - encourage PO intake on 10/9 given uptrend of sCr, consider fluid bolus if continues to uptrend 10/10   - Encourage increased PO intake     # Purpuric rash, suspected fixed drug eruption  # Concern for possible heparin-induced thrombocytopenia (HIT) -resolved  - Purple, reticular pattern of plaques noted around the bilateral breasts, scalp and groin,  with tense bullae. Non-erythematous, no purulence  - Some initial concern for possible heparin-induced thrombocytopenia with concurrent skin findings.  Discontinued heparin 9/16 and transitioned to bivalirudin  - 4 T score 0, PF4 w/ reflex BINA negative. Hematology ultimately consulted, felt there was low concern for HIT and signed off  - Differential diagnosis includes purpura secondary to infection versus coagulopathy versus vasculopathy versus small and/or medium vessel vasculitis versus calciphylaxis vs drug rash  - Dermatology consulted, work-up to date:  - Low Protein C activity  - ANCA, PR3 and MPO negative  - ISABELLE neg  - cryoglobulin labs- not enough specimen to analyze  - S/p skin punch biopsies x 2 9/15 and one on 9/18, showed SUPERFICIAL EPIDERMAL DEGENERATION WITH ERYTHROCYTE EXTRAVASATION WITH NEUTROPHILS. These findings could be seen secondary to an older trauma, or resolving erythema multiforme, a fixed drug eruption, Edgar-Christopher syndrome, or toxic epidermal necrolysis. Favor Multifocal Fixed Drug Eruption    - Dermatology contacted (9/20) about results, recommended adding vancomycin to allergy list and starting triamcinolone  - Continue triamcinolone 0.1% cream BID (9/20-current) per derm recs  - On 9/25, concern from pt and nursing that rash is not improving. Under breasts and groin still causing pain. Bilateral upper thighs and forehead rash improving   - Wound care re-consulted 9/25, s/o 10/3 as derm gave recs below (wound pictures documented in WC note from 10/3)  - Re-engaged dermatology on 9/25 given worsening rash; recommended to apply vasoline to all eroded/denuded areas and continue triamcinolone cream to both the black plaques as well as the red areas across the trunk and thighs. Per dermatology, erythema is mild and not palpable, it is most likely part of the drug eruption the patient has previously been known to have     # Leukocytosis-- resolved   - Likely I/s/o rash vs reactive vs infectious process  - Leukocytosis noted on admission to MICU (WBC 24.8) --> 12.7 (9/25) --> 8.3 (10/3)  - Leukocytosis improved throughout hospital stay  however started uptrending 9/20; now improving on 9/25   - Blood cultures 9/10, 9/12, 9/15 all NGTD  - Strep and Legionella Ag, MRSA nares negative  - CXR (9/21) largely unremarkable, again re-demonstrated perihilar prominence seen on prior XR  - Abdominal xray (9/21) unremarkable  - Repeat blood cultures x 2 (9/21) NGTD  - UA (9/21) with 500 LE  - UA (9/30) +bacteria, +leuks, urine culture (9/29) mult organisms  - UA sent 10/3 with +blood, leuks, and WBC- not reflex to cx so UCX added on to 10/3 UA and again showed multiple organisms  - Will hold off tx for UTI for now as pt asymptomatic and leukocytosis improved. 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  # ACS  - Previously managed through routine RBC exchanges q6 weeks, most recent RBCEx 3/1/24, per patient pausing on transfusions for time being  - OARRS reviewed, no aberrant behavior noted  - LDH>12,000 (now downtrending), haptoglobin <30, fibrinogen 318, retic % 5.2 on presentation, bili 10.7  - Leukocytosis 24.8 on admission to MICU  - CXR (9/10) persistent atelectasis/scarring in the left lower lung  - CT CAP (9/11) Diffuse mosaic attenuation of the lung parenchyma, which can be seen in the setting of small airway disease, pulmonary edema or acute infection  - Intubated upon arrival to MICU, now s/p extubation 9/16  - s/p Vanc and Zosyn (finished both courses prior to floor transfer)  - Procal elevated 7.09 (9/11)  - Blood cultures 9/10, 9/12, 9/15 all NGTD  - Strep and Legionella Ag, MRSA nares negative  - Trop peak 1431, cards rec treat as ACS (see below)  - S/p 2u pRBCs 9/10 on arrival to MICU  - S/p exchange transfusion 9/11 AM for ACS  - Care plan from 12/6/23- For mild to moderate pain: Dilaudid 0.5mg IVP q3h as needed, for moderate to severe pain Dilaudid 1- 1.5mg q3h PRN  - On arrival to [facility], started on 0.6mg dilaudid IVP q3h PRN severe pain   - s/p 0.6mg dilaudid IVP q4h PRN breakthrough and 15mg PO oxycodone Q4 hrs severe  pain (9/25)  - Per discussion with Dr. Whaley (9/25), okay to increase PO oxycodone to 15-20mg Q4hr to optimize pain for discharge to SNF.   - On 9/26, discontinued IV dilaudid and increased to 20mg PO oxycodone q4hrs for severe pain   - Opioids on hold as of 9/28 d/t AMS- resumed 2.5mg oxy q4hrs PRN severe pain on 10/2. ER oxy 10mg BID still held  - (10/5- current) increased pain reg as AMS resolved--> continue oxycodone 10mg q3hrs PRN severe pain  - Continue to hold ER Oxycontin indefinitely as was major contributor to AMS (was started in ICU and was not on this prior to admission)  - Bowel regimen for opioid induced constipation with Senna 2tabs BID, Miralax BID, and dulcolax suppository 10mg PRN constipation  - Zofran for opioid induced nausea, no benadryl ordered (assumed from previous AMS but also no reports of opioid-induced pruritus)  - Continue home Folic Acid 1mg daily, continue thiamine 100mg daily     # ST depression with tropinemia, suspected 2/2 demand ischemia iso ACS  # Hx SVT  - Elevated troponins on admission, peak 1431 and now downtrending   - EKG with ST depressions, likely Type II MI due to vaso-occlusive crisis and anemia  - TTE 9/11: EF 60-65%, RV enlarged and reduced in function which appears stable when compared to last TTE (1/2024)  - Troponin leak is likely due to cardiac demand vs supply mismatch in the setting of worsening anemia and vaso-occlusive crisis  - Cardiology consulted, now signed off with indication to treat troponemia as ACS  - Lasix held in MICU 2/2 hypotension and CHARLES, can resume as able   - Metop 12.5mg BID held in MICU 2/2 hypotension- BP and HR continue to remain stable, holding off on restarting for now  - Follow-up with Cardiology outpt, FUV 2/13/25     # Acute liver injury, improving  # Direct hyperbilirubinuria, improving  - Suspected 2/2 hemodynamic instability versus sickle cell crisis  - On presentation to  MICU ALT 2611, AST 4727, T bili 10.2, INR 3.0; continues  to improve  - CT with hepatic venous congestion, patient lacks gallbladder  - Liver US with Doppler: Diffuse fatty infiltration, unremarkable doppler interrogation of the liver  - EBV IgG positive, IgM negative  - CMV IgG positive, PCR negative  - Acute hepatitis panel unremarkable  - Hepatology initially following, now signed off  - Will continue to monitor, trend daily CMP     # pHTN   - Initial c/f CTEPH as imaging findings with dilated PA, filling defects, and mosaicism  - VQ scan (6/13/23) with non anatomical basal defects and RUL defect due to scar--> not favoring CTEPH per Dr. Yi and Dr. 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 On lifelong anticoagulation, DOAC discouraged due to high risk of clotting   - Home Coumadin 5mg daily initially HELD on admission to MICU 2/2 unstable course  - Restarted coumadin 9/18 PM, will plan to bridge with bival, Ok'd by heme   - 9/20 INR 3.8, bival and home warfarin held. Pharmacy rec repeat INR 4 hours after bival was held. If the INR < 2, we can restart the bival, but if the INR 2-3, we can continue with warfarin monotherapy   - 9/20 repeat INR 3.7, pharmacy rec continuing to hold both medications  - 9/21 INR 3.2, per pharmacy - get repeat INR in AM 9/22, if INR between 2-3 can restart home warfarin 5mg daily  -INR 2.6 (9/24) after restarting Warfarin 5mg, will maintain INR  goal of 2-3  - INR 3.4 (9/25). Held warfarin on 9/25. Repeat INR 9/26. Plan to restart if <3   - INR 2.8 (9/26), restarted warfarin, recheck INR 9/27   - INR 4.0 (9/30), held warfarin, repeat INR 10/1 ordered, plan to restart if <3  - INR 6.8 (10/1), patient without any active bleeding, per attending and pharmacy, warfarin held   - INR 6.8 (10/2), patient without any active bleeding, per attending and pharmacy, warfarin held   - INR 6.7 (10/3), cont to hold warfarin. Ordered 5mg IV Vit K x1 (ok per pharmacy) d/t ?bleeding, vaginal vs wound excoriation and UA +blood  - 10/4 INR normalized to 1.6, continue to hold warfarin  - 10/5 pt c/o severe LLE pain and swelling; duplex BLE negative  - 10/5 INR 1.2; started heparin drip given normal INR while pending apheresis line placement (previous HIT workup with negative PF4)--> held 10/7 at 0400 for apheresis line placement  - 10/7 INR 1.2--> started bridge back to coumadin tonight with lovenox. Started after apheresis line placement will start coumadin 5mg and start therapeutic lovenox 120mg BID x5 days AND until INR therepeutic x24 hours (regimen discussed with pharmacy)  - 10/9 INR 1.5   - Follows with Coumadin clinic outpatient  - Plan to consult vascular medicine if INR is not therapeutic on home regimen      # CAN  - Continue home CPAP and home Albuterol PRN     # H/O Cauda Equine syndrome  - Follows Dr. Delacruz as outpatient  - No current issues      DISPO:  - Full code- confirmed on admission  - NOK: Tati Salgado (mother) (#[phone])  - PT/OT rec SNF, plan for rehab at [facility]  - DC to SNF pending improvement in pain, skin, INR/coumadin bridge, and RBCEx  - Derm FUV 11/7, Sickle Cell FUV 11/18, Cardiology FUV 2/13       I spent 60 minutes in the professional and overall care of this patient.    Assessment and plan as above discussed with attending physician, Dr. Tayo Ferrell PA-C

## 2024-10-09 NOTE — PROGRESS NOTES
Music Therapy Note    Senait Narvaez     Therapy Session  Referral Type: New referral this admission  Visit Type: Follow-up visit  Session Start Time: 1340  Session End Time: 1340  Intervention Delivery: In-person  Conflict of Service: Working with other staff               Treatment/Interventions       Post-assessment  Total Session Time (min): 0 minutes    Narrative  Assessment Detail: Patient working with other staff at time of MT's arrival. MT unable to make additional attempt this date.  Follow-up: MT to reattempt at another time as applicable.    Education Documentation  No documentation found.

## 2024-10-09 NOTE — NURSING NOTE
Attempted to wean patient to room air from 2L nasal cannula. Pt spo2 was above 92% for 5 hours. At 1600 pt spo2 87% on room air with no improvement with deep breathing intervention. Pt in no obvious respiratory distress, decision made to place patient back on 2L oxygen. Pt spo2 on oxygen now 99%

## 2024-10-09 NOTE — PROGRESS NOTES
Physical Therapy    Physical Therapy Treatment    Patient Name: Senait Narvaez  MRN: [id number]  Department: [facility]  Room: [id number]  Today's Date: 10/9/2024  Time Calculation  Start Time: 1245  Stop Time: 1320  Time Calculation (min): 35 min         Assessment/Plan   PT Assessment  PT Assessment Results: Decreased strength, Decreased range of motion, Decreased endurance, Impaired balance, Decreased mobility, Pain  Rehab Prognosis: Good  Barriers to Discharge: none  Evaluation/Treatment Tolerance: Patient limited by pain  Medical Staff Made Aware: Yes  Strengths: Attitude of self, Coping skills  Barriers to Participation: Comorbidities  End of Session Communication: Bedside nurse  Assessment Comment: The pt presented with R LE increased edema and L LE high level pain and the pt performed supine therapeutic exercises with moderate difficulty due to decreased strength and increased pain.  End of Session Patient Position: Bed, 3 rail up, Alarm off, caregiver present  PT Plan  Inpatient/Swing Bed or Outpatient: Inpatient  PT Plan  Treatment/Interventions: Bed mobility, Transfer training, Gait training, Stair training, Balance training, Strengthening, Endurance training, Range of motion, Therapeutic exercise, Therapeutic activity, Home exercise program  PT Plan: Ongoing PT  PT Frequency: 3 times per week  PT Discharge Recommendations: Moderate intensity level of continued care  Equipment Recommended upon Discharge: Wheeled walker  PT Recommended Transfer Status: Assist x1  PT - OK to Discharge: Yes      General Visit Information:   PT  Visit  PT Received On: 10/09/24  Response to Previous Treatment: Patient reporting fatigue but able to participate.  General  Family/Caregiver Present: Yes  Caregiver Feedback: Mother and daughter present with good support.  Prior to Session Communication: Bedside nurse  Patient Position Received: Bed, 3 rail up, Alarm off, caregiver present  Preferred Learning Style: verbal, visual,  written  General Comment: Pt declined to perform OOB activity due to high level L LE pain, but agreeable to supine therapeutic exercises.    Subjective   Precautions:  Precautions  Hearing/Visual Limitations: Hearing and vision WFL with glasses.  Medical Precautions: Fall precautions, Oxygen therapy device and L/min  Precautions Comment: Pt in compliance with precautions throughout PT session.    Vital Signs (Past 2hrs)        Date/Time Vitals Session Patient Position Pulse Resp SpO2 BP MAP (mmHg)    10/09/24 1245 Pre PT  Lying  87  --  97 %  --  --                   Vital Signs Comment: vitals stable     Objective   Pain:  Pain Assessment  Pain Assessment: 0-10  0-10 (Numeric) Pain Score: 8  Pain Type: Acute pain  Pain Location: Leg  Pain Orientation: Left  Pain Radiating Towards: left ankle  Pain Descriptors: Throbbing, Stabbing, Shooting, Sharp  Pain Frequency: Constant/continuous  Pain Onset: Ongoing  Clinical Progression: Gradually worsening (with light touch and movement)  Effect of Pain on Daily Activities: disabling  Patient's Stated Pain Goal: No pain  Pain Interventions: Therapeutic presence  Response to Interventions: Pain increase  Cognition:  Cognition  Overall Cognitive Status: Within Functional Limits  Orientation Level: Oriented X4  Following Commands: Follows all commands and directions without difficulty  Coordination:  Movements are Fluid and Coordinated: Yes  Coordination Comment: WFL  Postural Control:  Postural Control  Postural Control:  (not assessed)  Static Sitting Balance  Static Sitting-Balance Support:  (not assessed)  Dynamic Sitting Balance  Dynamic Sitting-Balance Support:  (not assessed)  Static Standing Balance  Static Standing-Balance Support:  (not assessed)  Dynamic Standing Balance  Dynamic Standing-Balance Support:  (not assessed)  Extremity/Trunk Assessments:     RUE   RUE : Within Functional Limits  LUE   LUE: Within Functional Limits  RLE   RLE : Exceptions to WFL  AROM RLE  (degrees)  RLE AROM Comment: WFL  Strength RLE  R Hip Flexion: 3-/5  R Knee Flexion: 3/5  R Knee Extension: 3/5  R Ankle Dorsiflexion: 4-/5  R Ankle Plantar Flexion: 4-/5  LLE   LLE : Exceptions to WFL  AROM LLE (degrees)  LLE AROM Comment: Decreased hip, knee, and ankle ROM.  Strength LLE  L Hip Flexion: 3-/5  L Knee Flexion: 3-/5  L Knee Extension: 3-/5  L Ankle Dorsiflexion: 3+/5  L Ankle Plantar Flexion: 3+/5  Activity Tolerance:  Activity Tolerance  Endurance: Tolerates 30 min exercise with multiple rests  Treatments:  Therapeutic Exercise  Therapeutic Exercise Performed: Yes  Therapeutic Exercise Activity 1: ankle pumps x 15  Therapeutic Exercise Activity 2: heel slides x 15  Therapeutic Exercise Activity 3: SAQ x 15  Therapeutic Exercise Activity 4: SLR x 15  Therapeutic Exercise Activity 5: Hip ABD x 15    Therapeutic Activity  Therapeutic Activity Performed: No    Balance/Neuromuscular Re-Education  Balance/Neuromuscular Re-Education Activity Performed: No    Bed Mobility  Bed Mobility: No    Ambulation/Gait Training  Ambulation/Gait Training Performed: No  Transfers  Transfer: No    Stairs  Stairs: No    Outcome Measures:  [facility] Basic Mobility  Turning from your back to your side while in a flat bed without using bedrails: A little  Moving from lying on your back to sitting on the side of a flat bed without using bedrails: A little  Moving to and from bed to chair (including a wheelchair): A lot  Standing up from a chair using your arms (e.g. wheelchair or bedside chair): A lot  To walk in hospital room: A lot  Climbing 3-5 steps with railing: Total  Basic Mobility - Total Score: 13    OP EDUCATION:  Outpatient Education  Individual(s) Educated: Patient, Parent, Child  Education Provided: Home Exercise Program  Patient Response to Education: Patient/Caregiver Verbalized Understanding of Information, Patient/Caregiver Performed Return Demonstration of Exercises/Activities    Encounter Problems        Encounter Problems (Active)       PT Problem       Patient will complete supine to sit and sit to supine Independent  (Progressing)       Start:  09/17/24    Expected End:  10/18/24            Patient will perform sit<>stand transfer with LRAD, and Modified Independent  (Progressing)       Start:  09/17/24    Expected End:  10/18/24            Patient will ambulate >150' with LRAD and Modified Independent  (Progressing)       Start:  09/17/24    Expected End:  10/18/24            Patient will complete Tinetti Balance Assesment with a score equal to or better than 18 to reduce falls risk.    (Progressing)       Start:  09/17/24    Expected End:  10/18/24               Pain - Adult

## 2024-10-10 LAB
ALBUMIN SERPL BCP-MCNC: 2.2 G/DL (ref 3.4–5)
ALP SERPL-CCNC: 129 U/L (ref 33–110)
ALT SERPL W P-5'-P-CCNC: 18 U/L (ref 7–45)
ANION GAP SERPL CALC-SCNC: 10 MMOL/L (ref 10–20)
AST SERPL W P-5'-P-CCNC: 29 U/L (ref 9–39)
BASOPHILS # BLD AUTO: 0.04 X10*3/UL (ref 0–0.1)
BASOPHILS NFR BLD AUTO: 0.4 %
BILIRUB SERPL-MCNC: 1.8 MG/DL (ref 0–1.2)
BUN SERPL-MCNC: 16 MG/DL (ref 6–23)
CALCIUM SERPL-MCNC: 8.2 MG/DL (ref 8.6–10.6)
CHLORIDE SERPL-SCNC: 98 MMOL/L (ref 98–107)
CO2 SERPL-SCNC: 34 MMOL/L (ref 21–32)
CREAT SERPL-MCNC: 1.3 MG/DL (ref 0.5–1.05)
EGFRCR SERPLBLD CKD-EPI 2021: 49 ML/MIN/1.73M*2
EOSINOPHIL # BLD AUTO: 0.29 X10*3/UL (ref 0–0.7)
EOSINOPHIL NFR BLD AUTO: 2.8 %
ERYTHROCYTE [DISTWIDTH] IN BLOOD BY AUTOMATED COUNT: 18.2 % (ref 11.5–14.5)
GLUCOSE SERPL-MCNC: 82 MG/DL (ref 74–99)
HCT VFR BLD AUTO: 26.5 % (ref 36–46)
HGB BLD-MCNC: 8.7 G/DL (ref 12–16)
HGB RETIC QN: 32 PG (ref 28–38)
IMM GRANULOCYTES # BLD AUTO: 0.07 X10*3/UL (ref 0–0.7)
IMM GRANULOCYTES NFR BLD AUTO: 0.7 % (ref 0–0.9)
IMMATURE RETIC FRACTION: 28.1 %
INR PPP: 1.6 (ref 0.9–1.1)
LDH SERPL L TO P-CCNC: 236 U/L (ref 84–246)
LYMPHOCYTES # BLD AUTO: 2.05 X10*3/UL (ref 1.2–4.8)
LYMPHOCYTES NFR BLD AUTO: 19.5 %
MAGNESIUM SERPL-MCNC: 1.4 MG/DL (ref 1.6–2.4)
MCH RBC QN AUTO: 29.6 PG (ref 26–34)
MCHC RBC AUTO-ENTMCNC: 32.8 G/DL (ref 32–36)
MCV RBC AUTO: 90 FL (ref 80–100)
MONOCYTES # BLD AUTO: 1.57 X10*3/UL (ref 0.1–1)
MONOCYTES NFR BLD AUTO: 15 %
NEUTROPHILS # BLD AUTO: 6.47 X10*3/UL (ref 1.2–7.7)
NEUTROPHILS NFR BLD AUTO: 61.6 %
NRBC BLD-RTO: 4.7 /100 WBCS (ref 0–0)
PLATELET # BLD AUTO: 214 X10*3/UL (ref 150–450)
POTASSIUM SERPL-SCNC: 4.5 MMOL/L (ref 3.5–5.3)
PROT SERPL-MCNC: 5.5 G/DL (ref 6.4–8.2)
PROTHROMBIN TIME: 17.6 SECONDS (ref 9.8–12.8)
RBC # BLD AUTO: 2.94 X10*6/UL (ref 4–5.2)
RETICS #: 0.2 X10*6/UL (ref 0.02–0.08)
RETICS/RBC NFR AUTO: 6.8 % (ref 0.5–2)
SODIUM SERPL-SCNC: 137 MMOL/L (ref 136–145)
WBC # BLD AUTO: 10.5 X10*3/UL (ref 4.4–11.3)

## 2024-10-10 PROCEDURE — 99233 SBSQ HOSP IP/OBS HIGH 50: CPT

## 2024-10-10 PROCEDURE — 85045 AUTOMATED RETICULOCYTE COUNT: CPT

## 2024-10-10 PROCEDURE — 85610 PROTHROMBIN TIME: CPT | Performed by: NURSE PRACTITIONER

## 2024-10-10 PROCEDURE — 36415 COLL VENOUS BLD VENIPUNCTURE: CPT | Performed by: NURSE PRACTITIONER

## 2024-10-10 PROCEDURE — 85025 COMPLETE CBC W/AUTO DIFF WBC: CPT | Performed by: NURSE PRACTITIONER

## 2024-10-10 PROCEDURE — 1170000001 HC PRIVATE ONCOLOGY ROOM DAILY

## 2024-10-10 PROCEDURE — 2500000001 HC RX 250 WO HCPCS SELF ADMINISTERED DRUGS (ALT 637 FOR MEDICARE OP)

## 2024-10-10 PROCEDURE — 2500000004 HC RX 250 GENERAL PHARMACY W/ HCPCS (ALT 636 FOR OP/ED): Performed by: NURSE PRACTITIONER

## 2024-10-10 PROCEDURE — 2500000004 HC RX 250 GENERAL PHARMACY W/ HCPCS (ALT 636 FOR OP/ED)

## 2024-10-10 PROCEDURE — 2500000005 HC RX 250 GENERAL PHARMACY W/O HCPCS: Performed by: NURSE PRACTITIONER

## 2024-10-10 PROCEDURE — 2500000002 HC RX 250 W HCPCS SELF ADMINISTERED DRUGS (ALT 637 FOR MEDICARE OP, ALT 636 FOR OP/ED): Performed by: NURSE PRACTITIONER

## 2024-10-10 PROCEDURE — 2500000001 HC RX 250 WO HCPCS SELF ADMINISTERED DRUGS (ALT 637 FOR MEDICARE OP): Performed by: NURSE PRACTITIONER

## 2024-10-10 PROCEDURE — 83615 LACTATE (LD) (LDH) ENZYME: CPT

## 2024-10-10 PROCEDURE — 80053 COMPREHEN METABOLIC PANEL: CPT | Performed by: NURSE PRACTITIONER

## 2024-10-10 PROCEDURE — 83735 ASSAY OF MAGNESIUM: CPT | Performed by: NURSE PRACTITIONER

## 2024-10-10 RX ORDER — FUROSEMIDE 10 MG/ML
20 INJECTION INTRAMUSCULAR; INTRAVENOUS ONCE
Status: COMPLETED | OUTPATIENT
Start: 2024-10-10 | End: 2024-10-10

## 2024-10-10 RX ORDER — LANOLIN ALCOHOL/MO/W.PET/CERES
800 CREAM (GRAM) TOPICAL ONCE
Status: COMPLETED | OUTPATIENT
Start: 2024-10-10 | End: 2024-10-10

## 2024-10-10 ASSESSMENT — COGNITIVE AND FUNCTIONAL STATUS - GENERAL
STANDING UP FROM CHAIR USING ARMS: A LITTLE
MOBILITY SCORE: 19
DAILY ACTIVITIY SCORE: 20
HELP NEEDED FOR BATHING: A LITTLE
DRESSING REGULAR LOWER BODY CLOTHING: A LITTLE
DRESSING REGULAR UPPER BODY CLOTHING: A LITTLE
CLIMB 3 TO 5 STEPS WITH RAILING: A LOT
TOILETING: A LITTLE
WALKING IN HOSPITAL ROOM: A LOT

## 2024-10-10 ASSESSMENT — PAIN - FUNCTIONAL ASSESSMENT
PAIN_FUNCTIONAL_ASSESSMENT: 0-10

## 2024-10-10 ASSESSMENT — PAIN SCALES - GENERAL
PAINLEVEL_OUTOF10: 8
PAINLEVEL_OUTOF10: 7
PAINLEVEL_OUTOF10: 9
PAINLEVEL_OUTOF10: 7
PAINLEVEL_OUTOF10: 8
PAINLEVEL_OUTOF10: 7
PAINLEVEL_OUTOF10: 8
PAINLEVEL_OUTOF10: 8

## 2024-10-10 NOTE — PROGRESS NOTES
10/10/24 1100   Discharge Planning   Who is requesting discharge planning? Patient   Home or Post Acute Services Post acute facilities (Rehab/SNF/etc)   Type of Post Acute Facility Services Skilled nursing   Expected Discharge Disposition SNF  ([facility])     Care Transitions Note  10/10/24  Patient had exchange recently and is now bridging coumadin. Plan to start pre-cert for SNF [facility] on Sunday. PT/OT notes are needed for pre-cert. Pre-cert team requests that PT/OT see patient Friday/Saturday so that pre-cert can begin Sunday in preparation for dc Monday. LSW updated whiteboard to reflect need for PT/OT with these instructions. PT made aware via secure chat. OT not signed in to patient today but will attempt to contact. Following for other dc needs.   YULI Zamorano

## 2024-10-10 NOTE — CARE PLAN
The patient's goals for the shift include      The clinical goals for the shift include patient will remain safe and free from injury this shift 10/10/24 0700      Problem: Pain - Adult  Goal: Verbalizes/displays adequate comfort level or baseline comfort level  Outcome: Progressing     Problem: Safety - Adult  Goal: Free from fall injury  Outcome: Progressing     Problem: Skin  Goal: Decreased wound size/increased tissue granulation at next dressing change  Outcome: Progressing  Goal: Participates in plan/prevention/treatment measures  Outcome: Progressing  Goal: Prevent/manage excess moisture  Outcome: Progressing  Flowsheets (Taken 10/9/2024 2043)  Prevent/manage excess moisture: Cleanse incontinence/protect with barrier cream  Goal: Prevent/minimize sheer/friction injuries  Outcome: Progressing  Goal: Promote/optimize nutrition  Outcome: Progressing  Goal: Promote skin healing  Outcome: Progressing

## 2024-10-10 NOTE — PROGRESS NOTES
"Senait Narvaez is a 54 y.o. female on day 30 of admission presenting with Sickle cell disease with crisis and other complication.    Subjective   Patient seen resting in bed. Reports feeling okay this morning. Continues to endorse 8/10 pain b/l LE as well as increased edema. Reports that the LE edema is unchanged since 1x dose of po lasix on 10/9, we discussed plan of IV lasix today and once edema is back to patient's baseline, plan to resume home dose lasix as it has been held for awhile since she's been admitted. Pt did not wear CPAP overnight d/t nose bleed, encouraged use. Otherwise denies shortness of breath, chest pain, abdominal pain, N/V/D, F/C.        Objective     Physical Exam  Constitutional:       Appearance: Normal appearance. She is obese.   HENT:      Mouth/Throat:      Mouth: Mucous membranes are moist.   Eyes:      Pupils: Pupils are equal, round, and reactive to light.   Cardiovascular:      Rate and Rhythm: Normal rate and regular rhythm.   Pulmonary:      Effort: Pulmonary effort is normal.      Breath sounds: Normal breath sounds. No rhonchi or rales.   Abdominal:      General: Abdomen is flat. Bowel sounds are normal.      Palpations: Abdomen is soft.   Musculoskeletal:      Cervical back: Normal range of motion and neck supple.      Right lower leg: Edema present.      Left lower leg: Edema present.      Comments: 3+ pitting edema of b/l LE   Skin:     General: Skin is warm.      Capillary Refill: Capillary refill takes less than 2 seconds.   Neurological:      General: No focal deficit present.      Mental Status: She is alert.   Psychiatric:         Mood and Affect: Mood normal.         Last Recorded Vitals  Blood pressure 116/65, pulse 68, temperature 36.7 °C (98.1 °F), temperature source Temporal, resp. rate 18, height 1.651 m (5' 5\"), weight 113 kg (248 lb 7.3 oz), SpO2 98%.  Intake/Output last 3 Shifts:  I/O last 3 completed shifts:  In: - (0 mL/kg)   Out: 1900 (16.9 mL/kg) [Urine:1900 " (0.5 mL/kg/hr)]  Weight: 112.7 kg     Relevant Results               This patient has a central line   Reason for the central line remaining today? Dialysis/Hemapheresis           Assessment/Plan   Assessment & Plan  Sickle cell disease with crisis and other complication  Senait Narvaez is a 54 year-old female with a PMHx of Hgb SC disease (previously on exchange transfusions q4-6 weeks, last transfusion 3/1/24), hx of SVC syndrome, recurrent DVT/PE (on lifelong Coumadin), pHTN (6/2023), cauda equina with saddle numbness, hx SVT, fibromyalgia, Raynaud's disease, CKD II (baseline SCr~<2) and CAN who presented to [facility] ED for diffuse pain and lethargy, then transferred to  MICU for hemodynamic instability. Patient was subsequently intubated due to worsening lethargic and lack of respiratory compensation from significant metabolic acidosis with concern for ACS. Vancomycin and Zosyn started for empiric coverage, patient also started on pressors for BP support. Patient was transfused 2 units pRBCs and then underwent RBCEx on 9/11. Cardiology consulted for c/f NSTEMI vs demand ischemia (ST depressions and elevated troponin), rec treat as ACS. Course further c/b severe CHARLES with peak sCr of 5.4, and acute liver injury with severely elevated liver enzymes (ALT 4119, AST >10k). Liver US only remarkable for fatty infiltration, viral hepatitis panel negative. CHARLES and liver injury improved with supportive management. Pruritic rash noted 9/14, Derm consulted and took multiple biopsies 9/15. Rash then began to worsen to involve the groin, lateral thigh, and scalp with numerous tense bullae. Discontinued heparin given concerns for potential HIT, started bivalirudin. However PF4 was negative. Patient was extubated on 9/16/2024. Started stress dose steroids given continued pressor requirements, pressors Dc'd 9/17.     Patient transferred [facility] 9/17, PT/OT rec SNF. Coumadin bridge was started 9/18. Given persistent rash with  unremarkable labs, derm re-biopsied on 9/18, findings ultimately felt to be most c/w fixed drug eruption. Dermatology contacted for bx results (9/20) rec triamcinolone cream BID and interdry cloths. Leukocytosis uptrending 9/21, repeat infectious workup negative (CXR, Abd XR, blood cultures, MRSA negative 9/21). UA with leuks. On 9/25, per nursing and patient, rash seems to be worsening. Derm re-engaged recommended to apply vasoline to all eroded/denuded areas and continue triamcinolone cream to both the black plaques as well as the red areas across the trunk and thighs. 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 Wound care following. For warfarin dosing, per pharmacy, if INR between 2-3 can restart home warfarin 5mg daily, if INR < 2 restart bival. INR 3.4 on 9/25, warfarin stopped, repeat INR 2.8 9/26 and warfarin restarted. On 9/26, increased PO oxycodone and discontinued IV dilaudid in anticipation for discharge. Overnight on 9/29, brain attack called out of concern for change in mental status, no focal deficits noted, BAT cancelled and narcan administered x3 with improvement of mental status. Mentation improved as of 10/2, small dose oxy resumed. INR supratherapeutic 10/3 at 6.7, 5mg IV Vit K x1 given (ok per pharmacy) I/s/o ?bleeding from vagina vs wound excoriation and UA with +blood. INR  down to 1.5 (10/4). Plan for RBCEx prior to dc per Dr. Whaley, planned 10/8. 10/5 ordered duplex BLE as pt c/o severe LLE pain and swelling which was negative. Left ankle x-ray (10/8) negative for acute fractures, showed soft tissue edema. 10/5 started heparin drip (per attending) given normal INR while pending apheresis line placement (previous HIT workup with negative PF4). 10/7 started bridge back to coumadin with lovenox after apheresis line. Continue therapeutic Lovenox 120mg BID with Coumadin 5mg nightly x5 days and until  INR is in therapeutic range x24 hours. DC to SNF pending improvement in pain, skin, INR/coumadin bridge, and RBCEx (likely beginning of next week d/t bridging time).     # Waxing and waning AMS-- resolved   - Felt to be due to opioids i/s/o worsening renal function and c/b respiratory acidosis  - All opioids on hold (9/28-10/2)  - 10/2 resumed oxycodone 10mg q3 hours as AMS now resolved   - continue to hold extended release oxycontin   - Keep supplemental O2 on board, but titrate to maintain SPO2 of 90-92% at most  - On CPAP at home, enforce nightly use to extent possible; may need BiPAP if unresolved     # CHARLES on CKD II   - Baseline ~ SCr <2, peak 5.4 this admission. 9/21 sCr 1.58 --> 1.13 (9/25)--> 1.14 (10/3)--> 0.96 (10/6)--> 0.99 (10/7) --> 0.97 (10/8) --> 1.30 (10/10)   - Likely pre-renal vs ATN given persistant hypotension over admission, on CKD II, now polyuric phase  - FeNA 0.4%  - Severe hyperkalemia with previous peaked T waves on EKG, resolved  - Nephrology following, now signed off   - s/p multiple K cocktails in MICU  - Encourage increased PO intake  - As of 10/10 starting to uptrend, encouraged increase PO intake, consider IVF support if continues to worsen      # Purpuric rash, suspected fixed drug eruption  # Concern for possible heparin-induced thrombocytopenia (HIT) -resolved  - Purple, reticular pattern of plaques noted around the bilateral breasts, scalp and groin,  with tense bullae. Non-erythematous, no purulence  - Some initial concern for possible heparin-induced thrombocytopenia with concurrent skin findings. Discontinued heparin 9/16 and transitioned to bivalirudin  - 4 T score 0, PF4 w/ reflex BINA negative. Hematology ultimately consulted, felt there was low concern for HIT and signed off  - Differential diagnosis includes purpura secondary to infection versus coagulopathy versus vasculopathy versus small and/or medium vessel vasculitis versus calciphylaxis vs drug rash  - Dermatology  consulted, work-up to date:  - Low Protein C activity  - ANCA, PR3 and MPO negative  - ISABELLE neg  - cryoglobulin labs- not enough specimen to analyze  - S/p skin punch biopsies x 2 9/15 and one on 9/18, showed SUPERFICIAL EPIDERMAL DEGENERATION WITH ERYTHROCYTE EXTRAVASATION WITH NEUTROPHILS. These findings could be seen secondary to an older trauma, or resolving erythema multiforme, a fixed drug eruption, Edgar-Christopher syndrome, or toxic epidermal necrolysis. Favor Multifocal Fixed Drug Eruption    - Dermatology contacted (9/20) about results, recommended adding vancomycin to allergy list and starting triamcinolone  - Continue triamcinolone 0.1% cream BID (9/20-current) per derm recs  - On 9/25, concern from pt and nursing that rash is not improving. Under breasts and groin still causing pain. Bilateral upper thighs and forehead rash improving   - Wound care re-consulted 9/25, s/o 10/3 as derm gave recs below (wound pictures documented in WC note from 10/3)  - Re-engaged dermatology on 9/25 given worsening rash; recommended to apply vasoline to all eroded/denuded areas and continue triamcinolone cream to both the black plaques as well as the red areas across the trunk and thighs. Per dermatology, erythema is mild and not palpable, it is most likely part of the drug eruption the patient has previously been known to have     # Leukocytosis-- resolved   - Likely I/s/o rash vs reactive vs infectious process  - Leukocytosis noted on admission to MICU (WBC 24.8) --> 12.7 (9/25) --> 8.3 (10/3)  - Leukocytosis improved throughout hospital stay however started uptrending 9/20; now improving on 9/25   - Blood cultures 9/10, 9/12, 9/15 all NGTD  - Strep and Legionella Ag, MRSA nares negative  - CXR (9/21) largely unremarkable, again re-demonstrated perihilar prominence seen on prior XR  - Abdominal xray (9/21) unremarkable  - Repeat blood cultures x 2 (9/21) NGTD  - UA (9/21) with 500 LE  - UA (9/30) +bacteria, +leuks, urine  culture (9/29) mult organisms  - UA sent 10/3 with +blood, leuks, and WBC- not reflex to cx so UCX added on to 10/3 UA and again showed multiple organisms  - Will hold off tx for UTI for now as pt asymptomatic and leukocytosis improved. 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  # ACS  - Previously managed through routine RBC exchanges q6 weeks, most recent RBCEx 3/1/24, per patient pausing on transfusions for time being  - OARRS reviewed, no aberrant behavior noted  - LDH>12,000 (now downtrending), haptoglobin <30, fibrinogen 318, retic % 5.2 on presentation, bili 10.7  - Leukocytosis 24.8 on admission to MICU  - CXR (9/10) persistent atelectasis/scarring in the left lower lung  - CT CAP (9/11) Diffuse mosaic attenuation of the lung parenchyma, which can be seen in the setting of small airway disease, pulmonary edema or acute infection  - Intubated upon arrival to MICU, now s/p extubation 9/16  - s/p Vanc and Zosyn (finished both courses prior to floor transfer)  - Procal elevated 7.09 (9/11)  - Blood cultures 9/10, 9/12, 9/15 all NGTD  - Strep and Legionella Ag, MRSA nares negative  - Trop peak 1431, cards rec treat as ACS (see below)  - S/p 2u pRBCs 9/10 on arrival to MICU  - S/p exchange transfusion 9/11 AM for ACS  - S/p routine exchange transfusion 10/8 per Dr. Whaley   - Care plan from 12/6/23- For mild to moderate pain: Dilaudid 0.5mg IVP q3h as needed, for moderate to severe pain Dilaudid 1- 1.5mg q3h PRN  - On arrival to [facility], started on 0.6mg dilaudid IVP q3h PRN severe pain   - s/p 0.6mg dilaudid IVP q4h PRN breakthrough and 15mg PO oxycodone Q4 hrs severe pain (9/25)  - Per discussion with Dr. Whaley (9/25), okay to increase PO oxycodone to 15-20mg Q4hr to optimize pain for discharge to SNF.   - On 9/26, discontinued IV dilaudid and increased to 20mg PO oxycodone q4hrs for severe pain   - Opioids on hold as of 9/28 d/t AMS- resumed 2.5mg oxy q4hrs PRN severe pain on 10/2. ER oxy  10mg BID still held  - (10/5- current) increased pain reg as AMS resolved--> continue oxycodone 10mg q3hrs PRN severe pain  - Continue to hold ER Oxycontin indefinitely as was major contributor to AMS (was started in ICU and was not on this prior to admission)  - Bowel regimen for opioid induced constipation with Senna 2tabs BID, Miralax BID, and dulcolax suppository 10mg PRN constipation  - Zofran for opioid induced nausea, no benadryl ordered (assumed from previous AMS but also no reports of opioid-induced pruritus)  - Continue home Folic Acid 1mg daily, continue thiamine 100mg daily     # ST depression with tropinemia, suspected 2/2 demand ischemia iso ACS  # Hx SVT  - Elevated troponins on admission, peak 1431 and now downtrending   - EKG with ST depressions, likely Type II MI due to vaso-occlusive crisis and anemia  - TTE 9/11: EF 60-65%, RV enlarged and reduced in function which appears stable when compared to last TTE (1/2024)  - Troponin leak is likely due to cardiac demand vs supply mismatch in the setting of worsening anemia and vaso-occlusive crisis  - Cardiology consulted, now signed off with indication to treat troponemia as ACS  - Lasix held in MICU 2/2 hypotension and CHARLES, can resume as able   - Metop 12.5mg BID held in MICU 2/2 hypotension- BP and HR continue to remain stable, holding off on restarting for now  - s/p 1x dose of po lasix 20mg on 10/9 and s/p IVP 20mg lasix on 10/10 for volume overload, plan to resume home dose lasix once LE edema back to patient's baseline   - Follow-up with Cardiology outpt, FUV 2/13/25     # Acute liver injury, improving  # Direct hyperbilirubinuria, improving  - Suspected 2/2 hemodynamic instability versus sickle cell crisis  - On presentation to  MICU ALT 2611, AST 4727, T bili 10.2, INR 3.0; continues to improve  - CT with hepatic venous congestion, patient lacks gallbladder  - Liver US with Doppler: Diffuse fatty infiltration, unremarkable doppler  interrogation of the liver  - EBV IgG positive, IgM negative  - CMV IgG positive, PCR negative  - Acute hepatitis panel unremarkable  - Hepatology initially following, now signed off  - Will continue to monitor, trend daily CMP     # pHTN   - Initial c/f CTEPH as imaging findings with dilated PA, filling defects, and mosaicism  - VQ scan (6/13/23) with non anatomical basal defects and RUL defect due to scar--> not favoring CTEPH per Dr. Yi and Dr. 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 On lifelong anticoagulation, DOAC discouraged due to high risk of clotting   - Home Coumadin 5mg daily initially HELD on admission to MICU 2/2 unstable course  - Restarted coumadin 9/18 PM, will plan to bridge with bival, Ok'd by heme   - 9/20 INR 3.8, bival and home warfarin held. Pharmacy rec repeat INR 4 hours after bival was held. If the INR < 2, we can restart the bival, but if the INR 2-3, we can continue with warfarin monotherapy   - 9/20 repeat INR 3.7, pharmacy rec continuing to hold both medications  - 9/21 INR 3.2, per pharmacy - get repeat INR in AM 9/22, if INR between 2-3 can restart home warfarin 5mg daily  -INR 2.6 (9/24) after restarting Warfarin 5mg, will maintain INR goal of 2-3  - INR 3.4 (9/25). Held warfarin on 9/25. Repeat INR 9/26. Plan to restart if <3   - INR 2.8 (9/26), restarted warfarin, recheck INR 9/27    - INR 4.0 (9/30), held warfarin, repeat INR 10/1 ordered, plan to restart if <3  - INR 6.8 (10/1), patient without any active bleeding, per attending and pharmacy, warfarin held   - INR 6.8 (10/2), patient without any active bleeding, per attending and pharmacy, warfarin held   - INR 6.7 (10/3), cont to hold warfarin. Ordered 5mg IV Vit K x1 (ok per pharmacy) d/t ?bleeding, vaginal vs wound excoriation and UA +blood  - 10/4 INR normalized to 1.6, continue to hold warfarin  - 10/5 pt c/o severe LLE pain and swelling; duplex BLE negative  - 10/5 INR 1.2; started heparin drip given normal INR while pending apheresis line placement (previous HIT workup with negative PF4)--> held 10/7 at 0400 for apheresis line placement  - 10/7 INR 1.2--> started bridge back to coumadin tonight with lovenox. Started after apheresis line placement will start coumadin 5mg and start therapeutic lovenox 120mg BID x5 days AND until INR therepeutic x24 hours (regimen discussed with pharmacy)  - 10/10 INR 1.6   - Follows with Coumadin clinic outpatient  - Plan to consult vascular medicine if INR is not therapeutic on home regimen      # CAN  - Continue home CPAP and home Albuterol PRN     # H/O Cauda Equine syndrome  - Follows Dr. Delacruz as outpatient  - No current issues      DISPO:  - Full code- confirmed on admission  - NOK: Tati Salgado (mother) (#[phone])  - PT/OT rec SNF, plan for rehab at [facility]  - DC to SNF pending improvement in pain, skin, INR/coumadin bridge, and RBCEx  - Derm FUV 11/7, Sickle Cell FUV 11/18, Cardiology FUV 2/13         I spent 60  minutes in the professional and overall care of this patient.    Assessment and plan as above discussed with attending physician, Dr. Tayo Ferrell, SLIM

## 2024-10-10 NOTE — ASSESSMENT & PLAN NOTE
Senait Narvaez is a 54 year-old female with a PMHx of Hgb SC disease (previously on exchange transfusions q4-6 weeks, last transfusion 3/1/24), hx of SVC syndrome, recurrent DVT/PE (on lifelong Coumadin), pHTN (6/2023), cauda equina with saddle numbness, hx SVT, fibromyalgia, Raynaud's disease, CKD II (baseline SCr~<2) and CAN who presented to [facility] ED for diffuse pain and lethargy, then transferred to  MICU for hemodynamic instability. Patient was subsequently intubated due to worsening lethargic and lack of respiratory compensation from significant metabolic acidosis with concern for ACS. Vancomycin and Zosyn started for empiric coverage, patient also started on pressors for BP support. Patient was transfused 2 units pRBCs and then underwent RBCEx on 9/11. Cardiology consulted for c/f NSTEMI vs demand ischemia (ST depressions and elevated troponin), rec treat as ACS. Course further c/b severe CHARLES with peak sCr of 5.4, and acute liver injury with severely elevated liver enzymes (ALT 4119, AST >10k). Liver US only remarkable for fatty infiltration, viral hepatitis panel negative. CHARLES and liver injury improved with supportive management. Pruritic rash noted 9/14, Derm consulted and took multiple biopsies 9/15. Rash then began to worsen to involve the groin, lateral thigh, and scalp with numerous tense bullae. Discontinued heparin given concerns for potential HIT, started bivalirudin. However PF4 was negative. Patient was extubated on 9/16/2024. Started stress dose steroids given continued pressor requirements, pressors Dc'd 9/17.     Patient transferred to [facility] 9/17, PT/OT rec SNF. Coumadin bridge was started 9/18. Given persistent rash with unremarkable labs, derm re-biopsied on 9/18, findings ultimately felt to be most c/w fixed drug eruption. Dermatology contacted for bx results (9/20) rec triamcinolone cream BID and interdry cloths. Leukocytosis uptrending 9/21, repeat infectious workup negative (CXR, Abd XR, blood  cultures, MRSA negative 9/21). UA with leuks. On 9/25, per nursing and patient, rash seems to be worsening. Derm re-engaged recommended to apply vasoline to all eroded/denuded areas and continue triamcinolone cream to both the black plaques as well as the red areas across the trunk and thighs. 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 Wound care following. For warfarin dosing, per pharmacy, if INR between 2-3 can restart home warfarin 5mg daily, if INR < 2 restart bival. INR 3.4 on 9/25, warfarin stopped, repeat INR 2.8 9/26 and warfarin restarted. On 9/26, increased PO oxycodone and discontinued IV dilaudid in anticipation for discharge. Overnight on 9/29, brain attack called out of concern for change in mental status, no focal deficits noted, BAT cancelled and narcan administered x3 with improvement of mental status. Mentation improved as of 10/2, small dose oxy resumed. INR supratherapeutic 10/3 at 6.7, 5mg IV Vit K x1 given (ok per pharmacy) I/s/o ?bleeding from vagina vs wound excoriation and UA with +blood. INR  down to 1.5 (10/4). Plan for RBCEx prior to dc per Dr. Whaley, planned 10/8. 10/5 ordered duplex BLE as pt c/o severe LLE pain and swelling which was negative. Left ankle x-ray (10/8) negative for acute fractures, showed soft tissue edema. 10/5 started heparin drip (per attending) given normal INR while pending apheresis line placement (previous HIT workup with negative PF4). 10/7 started bridge back to coumadin with lovenox after apheresis line. Continue therapeutic Lovenox 120mg BID with Coumadin 5mg nightly x5 days and until INR is in therapeutic range x24 hours. DC to SNF pending improvement in pain, skin, INR/coumadin bridge, and RBCEx (likely beginning of next week d/t bridging time).     # Waxing and waning AMS-- resolved   - Felt to be due to opioids i/s/o worsening renal function and c/b  respiratory acidosis  - All opioids on hold (9/28-10/2)  - 10/2 resumed oxycodone 10mg q3 hours as AMS now resolved   - continue to hold extended release oxycontin   - Keep supplemental O2 on board, but titrate to maintain SPO2 of 90-92% at most  - On CPAP at home, enforce nightly use to extent possible; may need BiPAP if unresolved     # CHARLES on CKD II   - Baseline ~ SCr <2, peak 5.4 this admission. 9/21 sCr 1.58 --> 1.13 (9/25)--> 1.14 (10/3)--> 0.96 (10/6)--> 0.99 (10/7) --> 0.97 (10/8) --> 1.30 (10/10)   - Likely pre-renal vs ATN given persistant hypotension over admission, on CKD II, now polyuric phase  - FeNA 0.4%  - Severe hyperkalemia with previous peaked T waves on EKG, resolved  - Nephrology following, now signed off   - s/p multiple K cocktails in MICU  - Encourage increased PO intake  - As of 10/10 starting to uptrend, encouraged increase PO intake, consider IVF support if continues to worsen      # Purpuric rash, suspected fixed drug eruption  # Concern for possible heparin-induced thrombocytopenia (HIT) -resolved  - Purple, reticular pattern of plaques noted around the bilateral breasts, scalp and groin,  with tense bullae. Non-erythematous, no purulence  - Some initial concern for possible heparin-induced thrombocytopenia with concurrent skin findings. Discontinued heparin 9/16 and transitioned to bivalirudin  - 4 T score 0, PF4 w/ reflex BINA negative. Hematology ultimately consulted, felt there was low concern for HIT and signed off  - Differential diagnosis includes purpura secondary to infection versus coagulopathy versus vasculopathy versus small and/or medium vessel vasculitis versus calciphylaxis vs drug rash  - Dermatology consulted, work-up to date:  - Low Protein C activity  - ANCA, PR3 and MPO negative  - ISABELLE neg  - cryoglobulin labs- not enough specimen to analyze  - S/p skin punch biopsies x 2 9/15 and one on 9/18, showed SUPERFICIAL EPIDERMAL DEGENERATION WITH ERYTHROCYTE EXTRAVASATION  WITH NEUTROPHILS. These findings could be seen secondary to an older trauma, or resolving erythema multiforme, a fixed drug eruption, Edgar-Christopher syndrome, or toxic epidermal necrolysis. Favor Multifocal Fixed Drug Eruption    - Dermatology contacted (9/20) about results, recommended adding vancomycin to allergy list and starting triamcinolone  - Continue triamcinolone 0.1% cream BID (9/20-current) per derm recs  - On 9/25, concern from pt and nursing that rash is not improving. Under breasts and groin still causing pain. Bilateral upper thighs and forehead rash improving   - Wound care re-consulted 9/25, s/o 10/3 as derm gave recs below (wound pictures documented in WC note from 10/3)  - Re-engaged dermatology on 9/25 given worsening rash; recommended to apply vasoline to all eroded/denuded areas and continue triamcinolone cream to both the black plaques as well as the red areas across the trunk and thighs. Per dermatology, erythema is mild and not palpable, it is most likely part of the drug eruption the patient has previously been known to have     # Leukocytosis-- resolved   - Likely I/s/o rash vs reactive vs infectious process  - Leukocytosis noted on admission to MICU (WBC 24.8) --> 12.7 (9/25) --> 8.3 (10/3)  - Leukocytosis improved throughout hospital stay however started uptrending 9/20; now improving on 9/25   - Blood cultures 9/10, 9/12, 9/15 all NGTD  - Strep and Legionella Ag, MRSA nares negative  - CXR (9/21) largely unremarkable, again re-demonstrated perihilar prominence seen on prior XR  - Abdominal xray (9/21) unremarkable  - Repeat blood cultures x 2 (9/21) NGTD  - UA (9/21) with 500 LE  - UA (9/30) +bacteria, +leuks, urine culture (9/29) mult organisms  - UA sent 10/3 with +blood, leuks, and WBC- not reflex to cx so UCX added on to 10/3 UA and again showed multiple organisms  - Will hold off tx for UTI for now as pt asymptomatic and leukocytosis improved. Plan for repeat UA if any  s/sx      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  # ACS  - Previously managed through routine RBC exchanges q6 weeks, most recent RBCEx 3/1/24, per patient pausing on transfusions for time being  - OARRS reviewed, no aberrant behavior noted  - LDH>12,000 (now downtrending), haptoglobin <30, fibrinogen 318, retic % 5.2 on presentation, bili 10.7  - Leukocytosis 24.8 on admission to MICU  - CXR (9/10) persistent atelectasis/scarring in the left lower lung  - CT CAP (9/11) Diffuse mosaic attenuation of the lung parenchyma, which can be seen in the setting of small airway disease, pulmonary edema or acute infection  - Intubated upon arrival to MICU, now s/p extubation 9/16  - s/p Vanc and Zosyn (finished both courses prior to floor transfer)  - Procal elevated 7.09 (9/11)  - Blood cultures 9/10, 9/12, 9/15 all NGTD  - Strep and Legionella Ag, MRSA nares negative  - Trop peak 1431, cards rec treat as ACS (see below)  - S/p 2u pRBCs 9/10 on arrival to MICU  - S/p exchange transfusion 9/11 AM for ACS  - S/p routine exchange transfusion 10/8 per Dr. Whaley   - Care plan from 12/6/23- For mild to moderate pain: Dilaudid 0.5mg IVP q3h as needed, for moderate to severe pain Dilaudid 1- 1.5mg q3h PRN  - On arrival to [facility], started on 0.6mg dilaudid IVP q3h PRN severe pain   - s/p 0.6mg dilaudid IVP q4h PRN breakthrough and 15mg PO oxycodone Q4 hrs severe pain (9/25)  - Per discussion with Dr. Whaley (9/25), okay to increase PO oxycodone to 15-20mg Q4hr to optimize pain for discharge to SNF.   - On 9/26, discontinued IV dilaudid and increased to 20mg PO oxycodone q4hrs for severe pain   - Opioids on hold as of 9/28 d/t AMS- resumed 2.5mg oxy q4hrs PRN severe pain on 10/2. ER oxy 10mg BID still held  - (10/5- current) increased pain reg as AMS resolved--> continue oxycodone 10mg q3hrs PRN severe pain  - Continue to hold ER Oxycontin indefinitely as was major contributor to AMS (was started in ICU and was not on this prior to admission)  - Bowel  regimen for opioid induced constipation with Senna 2tabs BID, Miralax BID, and dulcolax suppository 10mg PRN constipation  - Zofran for opioid induced nausea, no benadryl ordered (assumed from previous AMS but also no reports of opioid-induced pruritus)  - Continue home Folic Acid 1mg daily, continue thiamine 100mg daily     # ST depression with tropinemia, suspected 2/2 demand ischemia iso ACS  # Hx SVT  - Elevated troponins on admission, peak 1431 and now downtrending   - EKG with ST depressions, likely Type II MI due to vaso-occlusive crisis and anemia  - TTE 9/11: EF 60-65%, RV enlarged and reduced in function which appears stable when compared to last TTE (1/2024)  - Troponin leak is likely due to cardiac demand vs supply mismatch in the setting of worsening anemia and vaso-occlusive crisis  - Cardiology consulted, now signed off with indication to treat troponemia as ACS  - Lasix held in MICU 2/2 hypotension and CHARLES, can resume as able   - Metop 12.5mg BID held in MICU 2/2 hypotension- BP and HR continue to remain stable, holding off on restarting for now  - s/p 1x dose of po lasix 20mg on 10/9 and s/p IVP 20mg lasix on 10/10 for volume overload, plan to resume home dose lasix once LE edema back to patient's baseline   - Follow-up with Cardiology outpt, FUV 2/13/25     # Acute liver injury, improving  # Direct hyperbilirubinuria, improving  - Suspected 2/2 hemodynamic instability versus sickle cell crisis  - On presentation to  MICU ALT 2611, AST 4727, T bili 10.2, INR 3.0; continues to improve  - CT with hepatic venous congestion, patient lacks gallbladder  - Liver US with Doppler: Diffuse fatty infiltration, unremarkable doppler interrogation of the liver  - EBV IgG positive, IgM negative  - CMV IgG positive, PCR negative  - Acute hepatitis panel unremarkable  - Hepatology initially following, now signed off  - Will continue to monitor, trend daily CMP     # pHTN   - Initial c/f CTEPH as imaging findings  with dilated PA, filling defects, and mosaicism  - VQ scan (6/13/23) with non anatomical basal defects and RUL defect due to scar--> not favoring CTEPH per Dr. Yi and Dr. 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 On lifelong anticoagulation, DOAC discouraged due to high risk of clotting   - Home Coumadin 5mg daily initially HELD on admission to MICU 2/2 unstable course  - Restarted coumadin 9/18 PM, will plan to bridge with bival, Ok'd by heme   - 9/20 INR 3.8, bival and home warfarin held. Pharmacy rec repeat INR 4 hours after bival was held. If the INR < 2, we can restart the bival, but if the INR 2-3, we can continue with warfarin monotherapy   - 9/20 repeat INR 3.7, pharmacy rec continuing to hold both medications  - 9/21 INR 3.2, per pharmacy - get repeat INR in AM 9/22, if INR between 2-3 can restart home warfarin 5mg daily  -INR 2.6 (9/24) after restarting Warfarin 5mg, will maintain INR goal of 2-3  - INR 3.4 (9/25). Held warfarin on 9/25. Repeat INR 9/26. Plan to restart if <3   - INR 2.8 (9/26), restarted warfarin, recheck INR 9/27   - INR 4.0 (9/30), held warfarin, repeat INR 10/1 ordered, plan to restart if <3  - INR 6.8 (10/1), patient without any active bleeding, per attending and pharmacy, warfarin held   - INR 6.8 (10/2), patient without any active bleeding, per attending and pharmacy, warfarin held   -  INR 6.7 (10/3), cont to hold warfarin. Ordered 5mg IV Vit K x1 (ok per pharmacy) d/t ?bleeding, vaginal vs wound excoriation and UA +blood  - 10/4 INR normalized to 1.6, continue to hold warfarin  - 10/5 pt c/o severe LLE pain and swelling; duplex BLE negative  - 10/5 INR 1.2; started heparin drip given normal INR while pending apheresis line placement (previous HIT workup with negative PF4)--> held 10/7 at 0400 for apheresis line placement  - 10/7 INR 1.2--> started bridge back to coumadin tonight with lovenox. Started after apheresis line placement will start coumadin 5mg and start therapeutic lovenox 120mg BID x5 days AND until INR therepeutic x24 hours (regimen discussed with pharmacy)  - 10/10 INR 1.6   - Follows with Coumadin clinic outpatient  - Plan to consult vascular medicine if INR is not therapeutic on home regimen      # CAN  - Continue home CPAP and home Albuterol PRN     # H/O Cauda Equine syndrome  - Follows Dr. Delacruz as outpatient  - No current issues      DISPO:  - Full code- confirmed on admission  - NOK: Tati Salgado (mother) (#[phone])  - PT/OT rec SNF, plan for rehab at [facility]  - DC to SNF pending improvement in pain, skin, INR/coumadin bridge, and RBCEx  - Derm FUV 11/7, Sickle Cell FUV 11/18, Cardiology FUV 2/13

## 2024-10-11 LAB
ALBUMIN SERPL BCP-MCNC: 2.2 G/DL (ref 3.4–5)
ALP SERPL-CCNC: 145 U/L (ref 33–110)
ALT SERPL W P-5'-P-CCNC: 19 U/L (ref 7–45)
ANION GAP SERPL CALC-SCNC: 9 MMOL/L (ref 10–20)
AST SERPL W P-5'-P-CCNC: 35 U/L (ref 9–39)
BASOPHILS # BLD AUTO: 0.02 X10*3/UL (ref 0–0.1)
BASOPHILS NFR BLD AUTO: 0.2 %
BILIRUB SERPL-MCNC: 1.7 MG/DL (ref 0–1.2)
BUN SERPL-MCNC: 15 MG/DL (ref 6–23)
CALCIUM SERPL-MCNC: 8.1 MG/DL (ref 8.6–10.6)
CHLORIDE SERPL-SCNC: 97 MMOL/L (ref 98–107)
CO2 SERPL-SCNC: 36 MMOL/L (ref 21–32)
CREAT SERPL-MCNC: 1.13 MG/DL (ref 0.5–1.05)
EGFRCR SERPLBLD CKD-EPI 2021: 58 ML/MIN/1.73M*2
EOSINOPHIL # BLD AUTO: 0.24 X10*3/UL (ref 0–0.7)
EOSINOPHIL NFR BLD AUTO: 2.9 %
ERYTHROCYTE [DISTWIDTH] IN BLOOD BY AUTOMATED COUNT: 18.5 % (ref 11.5–14.5)
GLUCOSE SERPL-MCNC: 69 MG/DL (ref 74–99)
HCT VFR BLD AUTO: 25.1 % (ref 36–46)
HGB BLD-MCNC: 8.1 G/DL (ref 12–16)
HGB RETIC QN: 33 PG (ref 28–38)
IMM GRANULOCYTES # BLD AUTO: 0.05 X10*3/UL (ref 0–0.7)
IMM GRANULOCYTES NFR BLD AUTO: 0.6 % (ref 0–0.9)
IMMATURE RETIC FRACTION: 28.3 %
INR PPP: 1.9 (ref 0.9–1.1)
LDH SERPL L TO P-CCNC: 231 U/L (ref 84–246)
LYMPHOCYTES # BLD AUTO: 1.31 X10*3/UL (ref 1.2–4.8)
LYMPHOCYTES NFR BLD AUTO: 15.6 %
MAGNESIUM SERPL-MCNC: 1.47 MG/DL (ref 1.6–2.4)
MCH RBC QN AUTO: 29.7 PG (ref 26–34)
MCHC RBC AUTO-ENTMCNC: 32.3 G/DL (ref 32–36)
MCV RBC AUTO: 92 FL (ref 80–100)
MONOCYTES # BLD AUTO: 1.42 X10*3/UL (ref 0.1–1)
MONOCYTES NFR BLD AUTO: 16.9 %
NEUTROPHILS # BLD AUTO: 5.37 X10*3/UL (ref 1.2–7.7)
NEUTROPHILS NFR BLD AUTO: 63.8 %
NRBC BLD-RTO: 5.1 /100 WBCS (ref 0–0)
PLATELET # BLD AUTO: 234 X10*3/UL (ref 150–450)
POTASSIUM SERPL-SCNC: 4.5 MMOL/L (ref 3.5–5.3)
PROT SERPL-MCNC: 5.5 G/DL (ref 6.4–8.2)
PROTHROMBIN TIME: 21.5 SECONDS (ref 9.8–12.8)
RBC # BLD AUTO: 2.73 X10*6/UL (ref 4–5.2)
RETICS #: 0.2 X10*6/UL (ref 0.02–0.08)
RETICS/RBC NFR AUTO: 7.2 % (ref 0.5–2)
SODIUM SERPL-SCNC: 137 MMOL/L (ref 136–145)
WBC # BLD AUTO: 8.4 X10*3/UL (ref 4.4–11.3)

## 2024-10-11 PROCEDURE — 83615 LACTATE (LD) (LDH) ENZYME: CPT

## 2024-10-11 PROCEDURE — 99233 SBSQ HOSP IP/OBS HIGH 50: CPT | Performed by: PHYSICIAN ASSISTANT

## 2024-10-11 PROCEDURE — 36415 COLL VENOUS BLD VENIPUNCTURE: CPT | Performed by: NURSE PRACTITIONER

## 2024-10-11 PROCEDURE — 1170000001 HC PRIVATE ONCOLOGY ROOM DAILY

## 2024-10-11 PROCEDURE — 80053 COMPREHEN METABOLIC PANEL: CPT | Performed by: NURSE PRACTITIONER

## 2024-10-11 PROCEDURE — 85045 AUTOMATED RETICULOCYTE COUNT: CPT

## 2024-10-11 PROCEDURE — 2500000004 HC RX 250 GENERAL PHARMACY W/ HCPCS (ALT 636 FOR OP/ED): Performed by: NURSE PRACTITIONER

## 2024-10-11 PROCEDURE — 97530 THERAPEUTIC ACTIVITIES: CPT | Mod: GP

## 2024-10-11 PROCEDURE — 2500000001 HC RX 250 WO HCPCS SELF ADMINISTERED DRUGS (ALT 637 FOR MEDICARE OP)

## 2024-10-11 PROCEDURE — 2500000001 HC RX 250 WO HCPCS SELF ADMINISTERED DRUGS (ALT 637 FOR MEDICARE OP): Performed by: NURSE PRACTITIONER

## 2024-10-11 PROCEDURE — 2500000005 HC RX 250 GENERAL PHARMACY W/O HCPCS: Performed by: NURSE PRACTITIONER

## 2024-10-11 PROCEDURE — 2500000002 HC RX 250 W HCPCS SELF ADMINISTERED DRUGS (ALT 637 FOR MEDICARE OP, ALT 636 FOR OP/ED): Performed by: NURSE PRACTITIONER

## 2024-10-11 PROCEDURE — 97110 THERAPEUTIC EXERCISES: CPT | Mod: GP

## 2024-10-11 PROCEDURE — 85025 COMPLETE CBC W/AUTO DIFF WBC: CPT | Performed by: NURSE PRACTITIONER

## 2024-10-11 PROCEDURE — 2500000004 HC RX 250 GENERAL PHARMACY W/ HCPCS (ALT 636 FOR OP/ED): Performed by: PHYSICIAN ASSISTANT

## 2024-10-11 PROCEDURE — 2500000001 HC RX 250 WO HCPCS SELF ADMINISTERED DRUGS (ALT 637 FOR MEDICARE OP): Performed by: PHYSICIAN ASSISTANT

## 2024-10-11 PROCEDURE — 97110 THERAPEUTIC EXERCISES: CPT | Mod: GO | Performed by: OCCUPATIONAL THERAPIST

## 2024-10-11 PROCEDURE — 97535 SELF CARE MNGMENT TRAINING: CPT | Mod: GO | Performed by: OCCUPATIONAL THERAPIST

## 2024-10-11 PROCEDURE — 83735 ASSAY OF MAGNESIUM: CPT | Performed by: NURSE PRACTITIONER

## 2024-10-11 PROCEDURE — 85610 PROTHROMBIN TIME: CPT | Performed by: NURSE PRACTITIONER

## 2024-10-11 RX ORDER — DIPHENHYDRAMINE HCL 25 MG
25 CAPSULE ORAL EVERY 6 HOURS PRN
Status: DISCONTINUED | OUTPATIENT
Start: 2024-10-11 | End: 2024-10-11

## 2024-10-11 RX ORDER — DIPHENHYDRAMINE HCL 25 MG
25 CAPSULE ORAL EVERY 4 HOURS PRN
Status: DISCONTINUED | OUTPATIENT
Start: 2024-10-11 | End: 2024-10-21 | Stop reason: HOSPADM

## 2024-10-11 RX ORDER — LANOLIN ALCOHOL/MO/W.PET/CERES
400 CREAM (GRAM) TOPICAL ONCE
Status: COMPLETED | OUTPATIENT
Start: 2024-10-11 | End: 2024-10-11

## 2024-10-11 ASSESSMENT — COGNITIVE AND FUNCTIONAL STATUS - GENERAL
STANDING UP FROM CHAIR USING ARMS: A LITTLE
DRESSING REGULAR UPPER BODY CLOTHING: A LOT
CLIMB 3 TO 5 STEPS WITH RAILING: A LOT
DRESSING REGULAR LOWER BODY CLOTHING: A LITTLE
WALKING IN HOSPITAL ROOM: A LOT
EATING MEALS: TOTAL
HELP NEEDED FOR BATHING: A LITTLE
CLIMB 3 TO 5 STEPS WITH RAILING: A LOT
DRESSING REGULAR LOWER BODY CLOTHING: A LITTLE
PERSONAL GROOMING: A LITTLE
TOILETING: A LITTLE
STANDING UP FROM CHAIR USING ARMS: A LITTLE
DRESSING REGULAR UPPER BODY CLOTHING: A LITTLE
MOBILITY SCORE: 19
DRESSING REGULAR UPPER BODY CLOTHING: A LITTLE
STANDING UP FROM CHAIR USING ARMS: A LOT
STANDING UP FROM CHAIR USING ARMS: A LITTLE
WALKING IN HOSPITAL ROOM: A LOT
DRESSING REGULAR UPPER BODY CLOTHING: A LITTLE
HELP NEEDED FOR BATHING: A LITTLE
CLIMB 3 TO 5 STEPS WITH RAILING: TOTAL
MOBILITY SCORE: 19
TOILETING: A LITTLE
EATING MEALS: A LITTLE
TOILETING: A LITTLE
TOILETING: A LITTLE
DAILY ACTIVITIY SCORE: 20
WALKING IN HOSPITAL ROOM: A LOT
TOILETING: TOTAL
HELP NEEDED FOR BATHING: A LITTLE
DRESSING REGULAR LOWER BODY CLOTHING: A LOT
MOVING FROM LYING ON BACK TO SITTING ON SIDE OF FLAT BED WITH BEDRAILS: A LITTLE
DAILY ACTIVITIY SCORE: 20
DRESSING REGULAR LOWER BODY CLOTHING: A LITTLE
MOBILITY SCORE: 12
DRESSING REGULAR LOWER BODY CLOTHING: A LITTLE
MOVING TO AND FROM BED TO CHAIR: A LOT
DAILY ACTIVITIY SCORE: 20
CLIMB 3 TO 5 STEPS WITH RAILING: A LOT
MOBILITY SCORE: 19
HELP NEEDED FOR BATHING: A LOT
WALKING IN HOSPITAL ROOM: TOTAL
TURNING FROM BACK TO SIDE WHILE IN FLAT BAD: A LITTLE
DAILY ACTIVITIY SCORE: 13
STANDING UP FROM CHAIR USING ARMS: A LITTLE

## 2024-10-11 ASSESSMENT — ACTIVITIES OF DAILY LIVING (ADL)
EFFECT OF PAIN ON DAILY ACTIVITIES: DECREASED MOBILITY
HOME_MANAGEMENT_TIME_ENTRY: 12

## 2024-10-11 ASSESSMENT — PAIN - FUNCTIONAL ASSESSMENT
PAIN_FUNCTIONAL_ASSESSMENT: 0-10

## 2024-10-11 ASSESSMENT — PAIN SCALES - GENERAL
PAINLEVEL_OUTOF10: 5 - MODERATE PAIN
PAINLEVEL_OUTOF10: 8
PAINLEVEL_OUTOF10: 5 - MODERATE PAIN
PAINLEVEL_OUTOF10: 9
PAINLEVEL_OUTOF10: 8
PAINLEVEL_OUTOF10: 9
PAINLEVEL_OUTOF10: 7
PAINLEVEL_OUTOF10: 8
PAINLEVEL_OUTOF10: 7
PAINLEVEL_OUTOF10: 8
PAINLEVEL_OUTOF10: 5 - MODERATE PAIN
PAINLEVEL_OUTOF10: 5 - MODERATE PAIN

## 2024-10-11 ASSESSMENT — PAIN DESCRIPTION - DESCRIPTORS: DESCRIPTORS: ACHING

## 2024-10-11 NOTE — PROGRESS NOTES
Physical Therapy    Physical Therapy Treatment    Patient Name: Senait Narvaez  MRN: [id number]  Department: [facility]  Room: [facility]4023-A  Today's Date: 10/11/2024  Time Calculation  Start Time: 1144  Stop Time: 1210  Time Calculation (min): 26 min         Assessment/Plan   PT Assessment  PT Assessment Results: Decreased strength, Decreased range of motion, Decreased endurance, Impaired balance, Decreased mobility, Pain  Rehab Prognosis: Good  Barriers to Discharge: none  Evaluation/Treatment Tolerance: Patient limited by fatigue, Patient limited by pain  Medical Staff Made Aware: Yes  Strengths: Attitude of self, Coping skills  Barriers to Participation: Comorbidities  End of Session Communication: Bedside nurse  Assessment Comment: The pt presented with high level L LE pain, which is a barrier to gait training but the pt performed bed mobility and EOB activities without difficulty.  End of Session Patient Position: Bed, 3 rail up, Alarm on  PT Plan  Inpatient/Swing Bed or Outpatient: Inpatient  PT Plan  Treatment/Interventions: Bed mobility, Transfer training, Gait training, Stair training, Balance training, Strengthening, Endurance training, Range of motion, Therapeutic exercise, Therapeutic activity, Home exercise program  PT Plan: Ongoing PT  PT Frequency: 3 times per week  PT Discharge Recommendations: Moderate intensity level of continued care  Equipment Recommended upon Discharge: Wheeled walker  PT Recommended Transfer Status: Assist x1  PT - OK to Discharge: Yes      General Visit Information:   PT  Visit  PT Received On: 10/11/24  Response to Previous Treatment: Patient reporting fatigue but able to participate.  General  Prior to Session Communication: Bedside nurse  Patient Position Received: Bed, 3 rail up, Alarm on  Preferred Learning Style: verbal, visual, written  General Comment: Pt declined to perform OOB activity due to her high level L LE pain, but agreeable to EOB activity.    Subjective    Precautions:  Precautions  Hearing/Visual Limitations: Hearing and vision WFL with glasses.  Medical Precautions: Fall precautions, Oxygen therapy device and L/min  Precautions Comment: Pt in compliance with precautions throughout PT session.    Vital Signs (Past 2hrs)        Date/Time Vitals Session Patient Position Pulse Resp SpO2 BP MAP (mmHg)    10/11/24 1132 --  --  70  12  98 %  101/62  --     10/11/24 1144 Pre PT  Lying  67  --  98 %  --  --                   Vital Signs Comment: vitals stable     Objective   Pain:  Pain Assessment  Pain Assessment: 0-10  0-10 (Numeric) Pain Score: 7  Pain Type: Acute pain  Pain Location: Leg  Pain Orientation: Left  Pain Descriptors: Aching  Pain Frequency: Constant/continuous  Pain Onset: Ongoing  Clinical Progression: Not changed  Effect of Pain on Daily Activities: decreased mobility  Patient's Stated Pain Goal: No pain  Pain Interventions: Therapeutic presence  Response to Interventions: Pain stable  Cognition:  Cognition  Overall Cognitive Status: Within Functional Limits  Orientation Level: Oriented X4  Following Commands: Follows all commands and directions without difficulty  Coordination:  Movements are Fluid and Coordinated: Yes  Coordination Comment: WFL  Postural Control:  Postural Control  Postural Control: Within Functional Limits  Posture Comment: Pt presented with good sitting posture, standing posture was not assessed due to high level L LE pain.  Static Sitting Balance  Static Sitting-Balance Support: Bilateral upper extremity supported, Feet supported  Static Sitting-Level of Assistance: Distant supervision  Static Sitting-Comment/Number of Minutes: Sitting EOB  Dynamic Sitting Balance  Dynamic Sitting-Balance Support: Bilateral upper extremity supported, Feet supported  Dynamic Sitting-Level of Assistance: Distant supervision  Dynamic Sitting-Comments: Sitting EOB  Static Standing Balance  Static Standing-Balance Support:  (not assessed)  Dynamic  Standing Balance  Dynamic Standing-Balance Support:  (not assessed)  Extremity/Trunk Assessments:     RUE   RUE : Within Functional Limits  LUE   LUE: Within Functional Limits  RLE   RLE : Exceptions to WFL  AROM RLE (degrees)  RLE AROM Comment: WFL  Strength RLE  R Hip Flexion: 3+/5  R Knee Flexion: 4-/5  R Knee Extension: 4-/5  R Ankle Dorsiflexion: 4+/5  R Ankle Plantar Flexion: 4+/5  LLE   LLE : Exceptions to WFL  AROM LLE (degrees)  LLE AROM Comment: Decreased hip, knee, and ankle ROM.  Strength LLE  L Hip Flexion: 3/5  L Knee Flexion: 3/5  L Knee Extension: 3/5  L Ankle Dorsiflexion: 3+/5  L Ankle Plantar Flexion: 3+/5  Activity Tolerance:  Activity Tolerance  Endurance: Decreased tolerance for upright activites  Treatments:  Therapeutic Exercise  Therapeutic Exercise Performed: Yes  Therapeutic Exercise Activity 1: seated ankle pumps x 10  Therapeutic Exercise Activity 2: LAQ x 10    Therapeutic Activity  Therapeutic Activity Performed: Yes  Therapeutic Activity 1: Pt sat EOB x 12 min and performed seated exercises and challenging sitting balance.    Balance/Neuromuscular Re-Education  Balance/Neuromuscular Re-Education Activity Performed: Yes  Balance/Neuromuscular Re-Education Activity 1: Supervision assistance static sitting balance using Raul UE support.  Balance/Neuromuscular Re-Education Activity 2: Supervision assistance dynamic sitting balance using Raul UE support.    Bed Mobility  Bed Mobility: Yes  Bed Mobility 1  Bed Mobility 1: Supine to sitting  Level of Assistance 1: Close supervision  Bed Mobility Comments 1: HOB elevated  Bed Mobility 2  Bed Mobility  2: Sitting to supine  Level of Assistance 2: Close supervision  Bed Mobility Comments 2: HOB elevated    Ambulation/Gait Training  Ambulation/Gait Training Performed: No  Transfers  Transfer: No    Stairs  Stairs: No    Outcome Measures:  [facility] Basic Mobility  Turning from your back to your side while in a flat bed without using bedrails: A  little  Moving from lying on your back to sitting on the side of a flat bed without using bedrails: A little  Moving to and from bed to chair (including a wheelchair): A lot  Standing up from a chair using your arms (e.g. wheelchair or bedside chair): A lot  To walk in hospital room: Total  Climbing 3-5 steps with railing: Total  Basic Mobility - Total Score: 12    OP EDUCATION:  Outpatient Education  Individual(s) Educated: Patient  Education Provided: Body Mechanics, Fall Risk, Home Exercise Program, Posture  Patient Response to Education: Patient/Caregiver Verbalized Understanding of Information, Patient/Caregiver Performed Return Demonstration of Exercises/Activities    Encounter Problems       Encounter Problems (Active)       PT Problem       Patient will complete supine to sit and sit to supine Independent  (Progressing)       Start:  09/17/24    Expected End:  10/18/24            Patient will perform sit<>stand transfer with LRAD, and Modified Independent  (Progressing)       Start:  09/17/24    Expected End:  10/18/24            Patient will ambulate >150' with LRAD and Modified Independent  (Progressing)       Start:  09/17/24    Expected End:  10/18/24            Patient will complete Tinetti Balance Assesment with a score equal to or better than 18 to reduce falls risk.    (Progressing)       Start:  09/17/24    Expected End:  10/18/24               Pain - Adult

## 2024-10-11 NOTE — PROGRESS NOTES
"Senait Narvaez is a 54 y.o. female on day 31 of admission presenting with Sickle cell disease with crisis and other complication.    Subjective   Patient seen resting in bed. Reports feeling okay this morning. Continues to endorse 8/10 pain b/l LE as well as increased edema. Reports that the LE edema is unchanged since 1x dose of po lasix on 10/9, we discussed plan of IV lasix today and once edema is back to patient's baseline, plan to resume home dose lasix as it has been held for awhile since she's been admitted. Pt did not wear CPAP overnight d/t nose bleed, encouraged use. Otherwise denies shortness of breath, chest pain, abdominal pain, N/V/D, F/C.        Objective     Physical Exam  Constitutional:       Appearance: Normal appearance. She is obese.   HENT:      Mouth/Throat:      Mouth: Mucous membranes are moist.   Eyes:      Pupils: Pupils are equal, round, and reactive to light.   Cardiovascular:      Rate and Rhythm: Normal rate and regular rhythm.   Pulmonary:      Effort: Pulmonary effort is normal.      Breath sounds: Normal breath sounds. No rhonchi or rales.   Abdominal:      General: Abdomen is flat. Bowel sounds are normal.      Palpations: Abdomen is soft.   Musculoskeletal:      Cervical back: Normal range of motion and neck supple.      Right lower leg: Edema present.      Left lower leg: Edema present.      Comments: 3+ pitting edema of b/l LE   Skin:     General: Skin is warm.      Capillary Refill: Capillary refill takes less than 2 seconds.   Neurological:      General: No focal deficit present.      Mental Status: She is alert.   Psychiatric:         Mood and Affect: Mood normal.       Last Recorded Vitals  Blood pressure 101/62, pulse 67, temperature 36.6 °C (97.9 °F), temperature source Temporal, resp. rate 12, height 1.651 m (5' 5\"), weight 113 kg (248 lb 7.3 oz), SpO2 98%.  Intake/Output last 3 Shifts:  I/O last 3 completed shifts:  In: - (0 mL/kg)   Out: 4050 (35.9 mL/kg) [Urine:4050 (1 " mL/kg/hr)]  Weight: 112.7 kg     Relevant Results    This patient has a central line   Reason for the central line remaining today? 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 Patient was subsequently intubated due to worsening lethargic and lack of respiratory compensation from significant metabolic acidosis with concern for ACS. Vancomycin and Zosyn started for empiric coverage, patient also started on pressors for BP support. Patient was transfused 2 units pRBCs and then underwent RBCEx on 9/11. Cardiology consulted for c/f NSTEMI vs demand ischemia (ST depressions and elevated troponin), rec treat as ACS. Course further c/b severe CHARLES with peak sCr of 5.4, and acute liver injury with severely elevated liver enzymes (ALT 4119, AST >10k). Liver US only remarkable for fatty infiltration, viral hepatitis panel negative. CHARLES and liver injury improved with supportive management. Pruritic rash noted 9/14, Derm consulted and took multiple biopsies 9/15. Rash then began to worsen to involve the groin, lateral thigh, and scalp with numerous tense bullae. Discontinued heparin given concerns for potential HIT, started bivalirudin. Patient was extubated on 9/16/2024. Started stress dose steroids given continued pressor requirements, pressors Dc'd 9/17. Patient transferred to [facility] 9/17, PT/OT rec SNF. Coumadin bridge was started 9/18. Given persistent rash with unremarkable labs, derm re-biopsied on 9/18, findings  ultimately felt to be most c/w fixed drug eruption. Dermatology contacted for bx results (9/20) rec triamcinolone cream BID and interdry cloths. Leukocytosis uptrending 9/21, repeat infectious workup negative (CXR, Abd XR, blood cultures, MRSA negative 9/21). UA with leuks, urine culture pending (9/21). On 9/25, per nursing and patient, rash seems to be worsening. Derm re-engaged recommended to apply vasoline to all eroded/denuded areas and continue triamcinolone cream to both the black plaques as well as the red areas across the trunk and thighs. 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 Wound care following. For warfarin dosing, per pharmacy, if INR between 2-3 can restart home warfarin 5mg daily, if INR < 2 restart bival. INR 3.4 on 9/25, warfarin stopped, repeat INR 2.8 9/26 and warfarin restarted. On 9/26, increased PO oxycodone and discontinued IV dilaudid in anticipation for discharge. Overnight on 9/29, brain attack called out of concern for change in mental status, no focal deficits noted, BAT cancelled and narcan administered x3 with improvement of mental status. Mentation improving as of 10/2, small dose oxy resumed. INR remains supratherapeutic 10/3 at 6.7, 5mg IV Vit K x1 given (ok per pharmacy) I/s/o ?bleeding from vagina vs wound excoriation and UA with +blood.  DC to SNF pending improvement in pain, skin, INR, and RBCEx.    # Waxing and waning AMS---Resolved   - Felt to be due to opioids i/s/o worsening renal function and c/b respiratory acidosis  - All opioids on hold (9/28-10/2 resumed small dose oxy)  - Keep supplemental O2 on board, but titrate to maintain SPO2 of 90-92% at most  - On CPAP at home, enforce nightly use to extent possible; may need BiPAP if unresolved    # CHARLES on CKD II, improving  - Baseline ~ SCr <2, peak 5.4 this admission. 9/21 sCr 1.58 --> 1.13 (9/25)--> 1.14 (10/3) --> 1.13  (10/11)  - Likely pre-renal vs ATN given persistant hypotension over admission, on CKD II, now polyuric phase  - FeNA 0.4%  - Severe hyperkalemia with previous peaked T waves on EKG, resolved  - Nephrology following  - s/p multiple K cocktails in MICU  - Encourage increased PO intake     # Purpuric rash, suspected fixed drug eruption  # Concern for possible heparin-induced thrombocytopenia (HIT) -resolved  - Purple, reticular pattern of plaques noted around the bilateral breasts, scalp and groin,  with tense bullae. Non-erythematous, no purulence  - Some initial concern for possible heparin-induced thrombocytopenia with concurrent skin findings. Discontinued heparin 9/16 and transitioned to bivalirudin  - 4 T score 0, PF4 w/ reflex BINA negative. Hematology ultimately consulted, felt there was low concern for HIT and signed off  - Differential diagnosis includes purpura secondary to infection versus coagulopathy versus vasculopathy versus small and/or medium vessel vasculitis versus calciphylaxis vs drug rash  - Dermatology consulted, work-up to date:  - Low Protein C activity  - ANCA, PR3 and MPO negative  - ISABELLE neg  - cryoglobulin labs- not enough specimen to analyze  - S/p skin punch biopsies x 2 9/15 and one on 9/18, showed SUPERFICIAL EPIDERMAL DEGENERATION WITH ERYTHROCYTE EXTRAVASATION WITH NEUTROPHILS. These findings could be seen secondary to an older trauma, or resolving erythema multiforme, a fixed drug eruption, Edgar-Christopher syndrome, or toxic epidermal necrolysis. Favor Multifocal Fixed Drug Eruption    - Dermatology contacted (9/20) about results, recommended adding vancomycin to allergy list and starting triamcinolone  - Continue triamcinolone 0.1% cream BID (9/20-current) per derm recs  - On 9/25, concern from pt and nursing that rash is not improving. Under breasts and groin still causing pain. Bilateral upper thighs and forehead rash improving   - Wound care re-consulted 9/25   - Re-engaged  dermatology on 9/25 given worsening rash; recommended to apply vasoline to all eroded/denuded areas and continue triamcinolone cream to both the black plaques as well as the red areas across the trunk and thighs. 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     # Leukocytosis , improving  - Likely I/s/o rash vs reactive vs infectious process  - Leukocytosis noted on admission to MICU (WBC 24.8) --> 12.7 (9/25) --> 8.3 (10/3)  - Leukocytosis improved throughout hospital stay however started uptrending 9/20; improving as of 9/25   - Blood cultures 9/10, 9/12, 9/15 all NGTD  - Strep and Legionella Ag, MRSA nares negative  - CXR (9/21) largely unremarkable, again re-demonstrated perihilar prominence seen on prior XR  - Abdominal xray (9/21) unremarkable  - Repeat blood cultures x 2 (9/21) NGTD  - UA (9/21) with 500 LE, urine cx pending  - UA (9/30) +bacteria, +leuks, urine culture (9/29) mult organisms  - UA sent 10/3 with +blood, leuks, and WBC-Cx pending.       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  # ACS  - Previously managed through routine RBC exchanges q6 weeks, most recent RBCEx 3/1/24, per patient pausing on transfusions for time being  - OARRS reviewed, no aberrant behavior noted  - LDH>12,000 (now downtrending), haptoglobin <30, fibrinogen 318, retic % 5.2 on presentation, bili 10.7  - Leukocytosis 24.8 on admission to MICU  - CXR (9/10) persistent atelectasis/scarring in the left lower lung  - CT CAP (9/11) Diffuse mosaic attenuation of the lung parenchyma, which can be seen in the setting of small airway disease, pulmonary edema or acute infection  - Intubated upon arrival to MICU, now s/p extubation 9/16  - Started on vanc and zosyn (finished both courses prior to floor transfer)  - Procal elevated 7.09 (9/11)  - Blood cultures 9/10, 9/12, 9/15 all NGTD  - Strep and Legionella Ag, MRSA nares negative  - Trop peak 1431,  cards rec treat as ACS (see below)  - S/p 2u pRBCs 9/10 on arrival to MICU  - S/p exchange transfusion 9/11 AM for ACS and repeat exchange on 10/8  - Care plan from 12/6/23- For mild to moderate pain: Dilaudid 0.5mg IVP q3h as needed, for moderate to severe pain Dilaudid 1- 1.5mg q3h PRN  - On arrival to [facility], started on 0.6mg dilaudid IVP q3h PRN severe pain   - s/p 0.6mg dilaudid IVP q4h PRN breakthrough and 15mg PO oxycodone Q4 hrs severe pain (9/25)  - Per discussion with Dr. Whaley (9/25), okay to increase PO oxycodone to 15-20mg Q4hr to optimize pain for discharge to SNF.   - On 9/26, discontinued IV dilaudid and increased to 20mg PO oxycodone q4hrs for severe pain   - Opioids on hold as of 9/28 d/t AMS- resumed 2.5mg oxy q4hrs PRN severe pain on 10/2. ER oxy 10mg BID still held  - Bowel regimen for opioid induced constipation, zofran for opioid induced nausea, and benadrly for opioid induced pruritus  - c/w home Duloxetine 40mg daily, PO Zofran PRN, Folic Acid 1mg daily, and MVI daily     # ST depression with tropinemia, suspected 2/2 demand ischemia iso ACS  # Hx SVT  - Elevated troponins on admission, peak 1431 and now downtrending   - EKG with ST depressions, likely Type II MI due to vaso-occlusive crisis and anemia  - TTE 9/11: EF 60-65%, RV enlarged and reduced in function which appears stable when compared to last TTE (1/2024)  - Troponin leak is likely due to cardiac demand vs supply mismatch in the setting of worsening anemia and vaso-occlusive crisis  - Cardiology consulted, now signed off with indication to treat troponemia as ACS  - Lasix held in MICU 2/2 hypotension and CHARLES, can resume as able   - Follow-up with Cardiology outpt, FUV 2/13/25     # Acute liver injury, improving  # Direct hyperbilirubinuria, improving  - Suspected 2/2 hemodynamic instability versus sickle cell crisis  - On presentation to  MICU ALT 2611, AST 4727, T bili 10.2, INR 3.0; continues to improve  - CT with hepatic venous  congestion, patient lacks gallbladder  - Liver US with Doppler: Diffuse fatty infiltration, unremarkable doppler interrogation of the liver  - EBV IgG positive, IgM negative  - CMV IgG positive, PCR negative  - Acute hepatitis panel unremarkable  - Hepatology initially following, now signed off  - Will continue to monitor, trend daily CMP     # pHTN   - Initial c/f CTEPH as imaging findings with dilated PA, filling defects, and mosaicism  - VQ scan (6/13/23) with non anatomical basal defects and RUL defect due to scar--> not favoring CTEPH per Dr. Yi and Dr. 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 On lifelong anticoagulation, DOAC discouraged due to high risk of clotting   - home Coumadin 5mg daily initially HELD on admission to MICU 2/2 unstable course  - restarted coumadin 9/18 PM, will plan to bridge with bival, Ok'd by heme   - 9/20 INR 3.8, bival and home warfarin held. Pharmacy rec repeat INR 4 hours after bival was held. If the INR < 2, we can restart the bival, but if the INR 2-3, we can continue with warfarin monotherapy   - 9/20 repeat INR 3.7, pharmacy rec continuing to hold both medications  - 9/21 INR 3.2, per pharmacy - get repeat INR in AM 9/22, if INR between 2-3 can restart home warfarin 5mg daily  -INR 2.6 (9/24) after restarting Warfarin 5mg, will maintain INR goal of 2-3  - INR 3.4 (9/25). Held  warfarin on 9/25. Repeat INR 9/26. Plan to restart if <3   - INR 2.8 (9/26), restarted warfarin, recheck INR 9/27   - INR 4.0 (9/30), held warfarin, repeat INR 10/1 ordered, plan to restart if <3  - INR 6.8 (10/1), patient without any active bleeding, per attending and pharmacy, warfarin held   - INR 6.8 (10/2), patient without any active bleeding, per attending and pharmacy, warfarin held   - INR 6.7 (10/3), cont to hold warfarin.   - INR 1.5 (10/4) - continue to hold warfarin  - INR 1.9 (10/11)- Cont bridging Coumadin with Lovenox   - Follows with Coumadin clinic outpatient    # CAN  - cont home CPAP   - cont home Albuterol PRN     # H/O Cauda Equine syndrome  - Follows Dr. Delacruz as outpatient  - no current issues      # DISPO  - Full code- confirmed on admission  - NOK: Tati Salgado (mother) (#[phone])  - PT/OT rec SNF, plan for rehab at [facility] likely Monday 10/14  - FUV Anim 10/7, Cardiology 2/13         I spent 60  minutes in the professional and overall care of this patient.    Assessment and plan as above discussed with attending physician, Dr. Tayo Gonzales PA-C

## 2024-10-11 NOTE — ASSESSMENT & PLAN NOTE
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 Patient was subsequently intubated due to worsening lethargic and lack of respiratory compensation from significant metabolic acidosis with concern for ACS. Vancomycin and Zosyn started for empiric coverage, patient also started on pressors for BP support. Patient was transfused 2 units pRBCs and then underwent RBCEx on 9/11. Cardiology consulted for c/f NSTEMI vs demand ischemia (ST depressions and elevated troponin), rec treat as ACS. Course further c/b severe CHARLES with peak sCr of 5.4, and acute liver injury with severely elevated liver enzymes (ALT 4119, AST >10k). Liver US only remarkable for fatty infiltration, viral hepatitis panel negative. CHARLES and liver injury improved with supportive management. Pruritic rash noted 9/14, Derm consulted and took multiple biopsies 9/15. Rash then began to worsen to involve the groin, lateral thigh, and scalp with numerous tense bullae. Discontinued heparin given concerns for potential HIT, started bivalirudin. Patient was extubated on 9/16/2024. Started stress dose steroids given continued pressor requirements, pressors Dc'd 9/17. Patient transferred to [facility] 9/17, PT/OT rec SNF. Coumadin bridge was started 9/18. Given persistent rash with unremarkable labs, derm re-biopsied on 9/18, findings ultimately felt to be most c/w fixed drug eruption. Dermatology contacted for bx results (9/20) rec triamcinolone cream BID and interdry cloths. Leukocytosis uptrending 9/21, repeat infectious workup negative (CXR, Abd XR, blood cultures, MRSA negative 9/21).  UA with leuks, urine culture pending (9/21). On 9/25, per nursing and patient, rash seems to be worsening. Derm re-engaged recommended to apply vasoline to all eroded/denuded areas and continue triamcinolone cream to both the black plaques as well as the red areas across the trunk and thighs. 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 Wound care following. For warfarin dosing, per pharmacy, if INR between 2-3 can restart home warfarin 5mg daily, if INR < 2 restart bival. INR 3.4 on 9/25, warfarin stopped, repeat INR 2.8 9/26 and warfarin restarted. On 9/26, increased PO oxycodone and discontinued IV dilaudid in anticipation for discharge. Overnight on 9/29, brain attack called out of concern for change in mental status, no focal deficits noted, BAT cancelled and narcan administered x3 with improvement of mental status. Mentation improving as of 10/2, small dose oxy resumed. INR remains supratherapeutic 10/3 at 6.7, 5mg IV Vit K x1 given (ok per pharmacy) I/s/o ?bleeding from vagina vs wound excoriation and UA with +blood.  DC to SNF pending improvement in pain, skin, INR, and RBCEx.    # Waxing and waning AMS---Resolved   - Felt to be due to opioids i/s/o worsening renal function and c/b respiratory acidosis  - All opioids on hold (9/28-10/2 resumed small dose oxy)  - Keep supplemental O2 on board, but titrate to maintain SPO2 of 90-92% at most  - On CPAP at home, enforce nightly use to extent possible; may need BiPAP if unresolved    # CHARLES on CKD II, improving  - Baseline ~ SCr <2, peak 5.4 this admission. 9/21 sCr 1.58 --> 1.13 (9/25)--> 1.14 (10/3) --> 1.13 (10/11)  - Likely pre-renal vs ATN given persistant hypotension over admission, on CKD II, now polyuric phase  - FeNA 0.4%  - Severe hyperkalemia with previous peaked T waves on EKG, resolved  - Nephrology following  - s/p multiple K cocktails in MICU  -  Encourage increased PO intake     # Purpuric rash, suspected fixed drug eruption  # Concern for possible heparin-induced thrombocytopenia (HIT) -resolved  - Purple, reticular pattern of plaques noted around the bilateral breasts, scalp and groin,  with tense bullae. Non-erythematous, no purulence  - Some initial concern for possible heparin-induced thrombocytopenia with concurrent skin findings. Discontinued heparin 9/16 and transitioned to bivalirudin  - 4 T score 0, PF4 w/ reflex BINA negative. Hematology ultimately consulted, felt there was low concern for HIT and signed off  - Differential diagnosis includes purpura secondary to infection versus coagulopathy versus vasculopathy versus small and/or medium vessel vasculitis versus calciphylaxis vs drug rash  - Dermatology consulted, work-up to date:  - Low Protein C activity  - ANCA, PR3 and MPO negative  - ISABELLE neg  - cryoglobulin labs- not enough specimen to analyze  - S/p skin punch biopsies x 2 9/15 and one on 9/18, showed SUPERFICIAL EPIDERMAL DEGENERATION WITH ERYTHROCYTE EXTRAVASATION WITH NEUTROPHILS. These findings could be seen secondary to an older trauma, or resolving erythema multiforme, a fixed drug eruption, Edgar-Christopher syndrome, or toxic epidermal necrolysis. Favor Multifocal Fixed Drug Eruption    - Dermatology contacted (9/20) about results, recommended adding vancomycin to allergy list and starting triamcinolone  - Continue triamcinolone 0.1% cream BID (9/20-current) per derm recs  - On 9/25, concern from pt and nursing that rash is not improving. Under breasts and groin still causing pain. Bilateral upper thighs and forehead rash improving   - Wound care re-consulted 9/25   - Re-engaged dermatology on 9/25 given worsening rash; recommended to apply vasoline to all eroded/denuded areas and continue triamcinolone cream to both the black plaques as well as the red areas across the trunk and thighs. Per dermatology, erythema is mild and not  palpable, it is most likely part of the drug eruption the patient has previously been known to have.     # Leukocytosis , improving  - Likely I/s/o rash vs reactive vs infectious process  - Leukocytosis noted on admission to MICU (WBC 24.8) --> 12.7 (9/25) --> 8.3 (10/3)  - Leukocytosis improved throughout hospital stay however started uptrending 9/20; improving as of 9/25   - Blood cultures 9/10, 9/12, 9/15 all NGTD  - Strep and Legionella Ag, MRSA nares negative  - CXR (9/21) largely unremarkable, again re-demonstrated perihilar prominence seen on prior XR  - Abdominal xray (9/21) unremarkable  - Repeat blood cultures x 2 (9/21) NGTD  - UA (9/21) with 500 LE, urine cx pending  - UA (9/30) +bacteria, +leuks, urine culture (9/29) mult organisms  - UA sent 10/3 with +blood, leuks, and WBC-Cx pending.       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  # ACS  - Previously managed through routine RBC exchanges q6 weeks, most recent RBCEx 3/1/24, per patient pausing on transfusions for time being  - OARRS reviewed, no aberrant behavior noted  - LDH>12,000 (now downtrending), haptoglobin <30, fibrinogen 318, retic % 5.2 on presentation, bili 10.7  - Leukocytosis 24.8 on admission to MICU  - CXR (9/10) persistent atelectasis/scarring in the left lower lung  - CT CAP (9/11) Diffuse mosaic attenuation of the lung parenchyma, which can be seen in the setting of small airway disease, pulmonary edema or acute infection  - Intubated upon arrival to MICU, now s/p extubation 9/16  - Started on vanc and zosyn (finished both courses prior to floor transfer)  - Procal elevated 7.09 (9/11)  - Blood cultures 9/10, 9/12, 9/15 all NGTD  - Strep and Legionella Ag, MRSA nares negative  - Trop peak 1431, cards rec treat as ACS (see below)  - S/p 2u pRBCs 9/10 on arrival to MICU  - S/p exchange transfusion 9/11 AM for ACS and repeat exchange on 10/8  - Care plan from 12/6/23- For mild to moderate pain: Dilaudid 0.5mg IVP q3h as needed, for moderate to  severe pain Dilaudid 1- 1.5mg q3h PRN  - On arrival to [facility], started on 0.6mg dilaudid IVP q3h PRN severe pain   - s/p 0.6mg dilaudid IVP q4h PRN breakthrough and 15mg PO oxycodone Q4 hrs severe pain (9/25)  - Per discussion with Dr. Whaley (9/25), okay to increase PO oxycodone to 15-20mg Q4hr to optimize pain for discharge to SNF.   - On 9/26, discontinued IV dilaudid and increased to 20mg PO oxycodone q4hrs for severe pain   - Opioids on hold as of 9/28 d/t AMS- resumed 2.5mg oxy q4hrs PRN severe pain on 10/2. ER oxy 10mg BID still held  - Bowel regimen for opioid induced constipation, zofran for opioid induced nausea, and benadrly for opioid induced pruritus  - c/w home Duloxetine 40mg daily, PO Zofran PRN, Folic Acid 1mg daily, and MVI daily     # ST depression with tropinemia, suspected 2/2 demand ischemia iso ACS  # Hx SVT  - Elevated troponins on admission, peak 1431 and now downtrending   - EKG with ST depressions, likely Type II MI due to vaso-occlusive crisis and anemia  - TTE 9/11: EF 60-65%, RV enlarged and reduced in function which appears stable when compared to last TTE (1/2024)  - Troponin leak is likely due to cardiac demand vs supply mismatch in the setting of worsening anemia and vaso-occlusive crisis  - Cardiology consulted, now signed off with indication to treat troponemia as ACS  - Lasix held in MICU 2/2 hypotension and CHARLES, can resume as able   - Follow-up with Cardiology outpt, FUV 2/13/25     # Acute liver injury, improving  # Direct hyperbilirubinuria, improving  - Suspected 2/2 hemodynamic instability versus sickle cell crisis  - [facility], T bili 10.2, INR 3.0; continues to improve  - CT with hepatic venous congestion, patient lacks gallbladder  - Liver US with Doppler: Diffuse fatty infiltration, unremarkable doppler interrogation of the liver  - EBV IgG positive, IgM negative  - CMV IgG positive, PCR negative  - Acute hepatitis panel unremarkable  -  Hepatology initially following, now signed off  - Will continue to monitor, trend daily CMP     # pHTN   - Initial c/f CTEPH as imaging findings with dilated PA, filling defects, and mosaicism  - VQ scan (6/13/23) with non anatomical basal defects and RUL defect due to scar--> not favoring CTEPH per Dr. Yi and Dr. 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 On lifelong anticoagulation, DOAC discouraged due to high risk of clotting   - home Coumadin 5mg daily initially HELD on admission to MICU 2/2 unstable course  - restarted coumadin 9/18 PM, will plan to bridge with bival, Ok'd by heme   - 9/20 INR 3.8, bival and home warfarin held. Pharmacy rec repeat INR 4 hours after bival was held. If the INR < 2, we can restart the bival, but if the INR 2-3, we can continue with warfarin monotherapy   - 9/20 repeat INR 3.7, pharmacy rec continuing to hold both medications  - 9/21 INR 3.2, per pharmacy - get repeat INR in AM 9/22, if INR between 2-3 can restart home warfarin 5mg daily  -INR 2.6 (9/24) after restarting Warfarin 5mg, will maintain INR goal of 2-3  - INR 3.4 (9/25). Held warfarin on 9/25. Repeat INR 9/26. Plan to restart if <3   - INR 2.8 (9/26), restarted warfarin, recheck INR 9/27   - INR 4.0 (9/30), held warfarin, repeat INR 10/1 ordered, plan to restart if <3  - INR 6.8 (10/1), patient without any active bleeding, per  attending and pharmacy, warfarin held   - INR 6.8 (10/2), patient without any active bleeding, per attending and pharmacy, warfarin held   - INR 6.7 (10/3), cont to hold warfarin.   - INR 1.5 (10/4) - continue to hold warfarin  - INR 1.9 (10/11)- Cont bridging Coumadin with Lovenox   - Follows with Coumadin clinic outpatient    # CAN  - cont home CPAP   - cont home Albuterol PRN     # H/O Cauda Equine syndrome  - Follows Dr. Delacruz as outpatient  - no current issues      # DISPO  - Full code- confirmed on admission  - NOK: Tati Salgado (mother) (#[phone])  - PT/OT rec SNF, plan for rehab at [facility] likely Monday 10/14  - FUV Anim 10/7, Cardiology 2/13

## 2024-10-11 NOTE — CARE PLAN
The patient's goals for the shift include      The clinical goals for the shift include pt will remain injury free    Problem: Pain - Adult  Goal: Verbalizes/displays adequate comfort level or baseline comfort level  Outcome: Progressing     Problem: Safety - Adult  Goal: Free from fall injury  Outcome: Progressing     Problem: Skin  Goal: Decreased wound size/increased tissue granulation at next dressing change  Outcome: Progressing  Flowsheets (Taken 10/11/2024 1012)  Decreased wound size/increased tissue granulation at next dressing change: Promote sleep for wound healing  Goal: Participates in plan/prevention/treatment measures  Outcome: Progressing  Goal: Prevent/manage excess moisture  Outcome: Progressing  Goal: Prevent/minimize sheer/friction injuries  Outcome: Progressing  Goal: Promote/optimize nutrition  Outcome: Progressing  Goal: Promote skin healing  Outcome: Progressing     Problem: Knowledge Deficit  Goal: Patient/family/caregiver demonstrates understanding of disease process, treatment plan, medications, and discharge instructions  Outcome: Progressing     Problem: Mechanical Ventilation  Goal: Patient Will Maintain Patent Airway  Outcome: Progressing  Goal: Oral health is maintained or improved  Outcome: Progressing  Goal: Tracheostomy will be managed safely  Outcome: Progressing  Goal: ET tube will be managed safely  Outcome: Progressing  Goal: Ability to express needs and understand communication  Outcome: Progressing  Goal: Mobility/activity is maintained at optimum level for patient  Outcome: Progressing     Problem: Pain  Goal: Takes deep breaths with improved pain control throughout the shift  Outcome: Progressing  Goal: Turns in bed with improved pain control throughout the shift  Outcome: Progressing  Goal: Walks with improved pain control throughout the shift  Outcome: Progressing  Goal: Performs ADL's with improved pain control throughout shift  Outcome: Progressing  Goal: Participates in  PT with improved pain control throughout the shift  Outcome: Progressing  Goal: Free from opioid side effects throughout the shift  Outcome: Progressing  Goal: Free from acute confusion related to pain meds throughout the shift  Outcome: Progressing     Problem: Nutrition  Goal: Less than 5 days NPO/clear liquids  Outcome: Progressing  Goal: Oral intake greater than 50%  Outcome: Progressing  Goal: Oral intake greater 75%  Outcome: Progressing  Goal: Consume prescribed supplement  Outcome: Progressing  Goal: Adequate PO fluid intake  Outcome: Progressing  Goal: Nutrition support goals are met within 48 hrs  Outcome: Progressing  Goal: Nutrition support is meeting 75% of nutrient needs  Outcome: Progressing  Goal: Tube feed tolerance  Outcome: Progressing  Goal: BG  mg/dL  Outcome: Progressing  Goal: Lab values WNL  Outcome: Progressing  Goal: Electrolytes WNL  Outcome: Progressing  Goal: Promote healing  Outcome: Progressing  Goal: Maintain stable weight  Outcome: Progressing  Goal: Reduce weight from edema/fluid  Outcome: Progressing  Goal: Gradual weight gain  Outcome: Progressing  Goal: Improve ostomy output  Outcome: Progressing     Problem: Fall/Injury  Goal: Not fall by end of shift  Outcome: Progressing  Goal: Be free from injury by end of the shift  Outcome: Progressing  Goal: Verbalize understanding of personal risk factors for fall in the hospital  Outcome: Progressing  Goal: Verbalize understanding of risk factor reduction measures to prevent injury from fall in the home  Outcome: Progressing  Goal: Use assistive devices by end of the shift  Outcome: Progressing  Goal: Pace activities to prevent fatigue by end of the shift  Outcome: Progressing

## 2024-10-11 NOTE — PROGRESS NOTES
Occupational Therapy                 Therapy Communication Note    Patient Name: Senait Narvaez  MRN: [id number]  Department: [facility]  Room: [facility]/4023-A  Today's Date: 10/11/2024     Discipline: Occupational Therapy    Missed Visit Reason: Missed Visit Reason:  (Patient just received breakfast and is starting to eat.)    Missed Time: Attempt

## 2024-10-12 LAB
ALBUMIN SERPL BCP-MCNC: 2.2 G/DL (ref 3.4–5)
ALP SERPL-CCNC: 132 U/L (ref 33–110)
ALT SERPL W P-5'-P-CCNC: 17 U/L (ref 7–45)
ANION GAP SERPL CALC-SCNC: 9 MMOL/L (ref 10–20)
AST SERPL W P-5'-P-CCNC: 33 U/L (ref 9–39)
BASOPHILS # BLD AUTO: 0.02 X10*3/UL (ref 0–0.1)
BASOPHILS NFR BLD AUTO: 0.2 %
BILIRUB SERPL-MCNC: 1.6 MG/DL (ref 0–1.2)
BLOOD EXPIRATION DATE: NORMAL
BUN SERPL-MCNC: 14 MG/DL (ref 6–23)
CALCIUM SERPL-MCNC: 8.4 MG/DL (ref 8.6–10.6)
CHLORIDE SERPL-SCNC: 98 MMOL/L (ref 98–107)
CO2 SERPL-SCNC: 36 MMOL/L (ref 21–32)
CREAT SERPL-MCNC: 0.91 MG/DL (ref 0.5–1.05)
DISPENSE STATUS: NORMAL
EGFRCR SERPLBLD CKD-EPI 2021: 75 ML/MIN/1.73M*2
EOSINOPHIL # BLD AUTO: 0.1 X10*3/UL (ref 0–0.7)
EOSINOPHIL NFR BLD AUTO: 1.1 %
ERYTHROCYTE [DISTWIDTH] IN BLOOD BY AUTOMATED COUNT: 18.3 % (ref 11.5–14.5)
GLUCOSE SERPL-MCNC: 66 MG/DL (ref 74–99)
HCT VFR BLD AUTO: 25.4 % (ref 36–46)
HGB BLD-MCNC: 8.3 G/DL (ref 12–16)
HGB RETIC QN: 34 PG (ref 28–38)
IMM GRANULOCYTES # BLD AUTO: 0.05 X10*3/UL (ref 0–0.7)
IMM GRANULOCYTES NFR BLD AUTO: 0.6 % (ref 0–0.9)
IMMATURE RETIC FRACTION: 25.6 %
INR PPP: 2.1 (ref 0.9–1.1)
LDH SERPL L TO P-CCNC: 235 U/L (ref 84–246)
LYMPHOCYTES # BLD AUTO: 1.14 X10*3/UL (ref 1.2–4.8)
LYMPHOCYTES NFR BLD AUTO: 13 %
MAGNESIUM SERPL-MCNC: 1.56 MG/DL (ref 1.6–2.4)
MCH RBC QN AUTO: 29.9 PG (ref 26–34)
MCHC RBC AUTO-ENTMCNC: 32.7 G/DL (ref 32–36)
MCV RBC AUTO: 91 FL (ref 80–100)
MONOCYTES # BLD AUTO: 1.18 X10*3/UL (ref 0.1–1)
MONOCYTES NFR BLD AUTO: 13.4 %
NEUTROPHILS # BLD AUTO: 6.31 X10*3/UL (ref 1.2–7.7)
NEUTROPHILS NFR BLD AUTO: 71.7 %
NRBC BLD-RTO: 4.3 /100 WBCS (ref 0–0)
PLATELET # BLD AUTO: 261 X10*3/UL (ref 150–450)
POTASSIUM SERPL-SCNC: 4.1 MMOL/L (ref 3.5–5.3)
PRODUCT BLOOD TYPE: 1700
PRODUCT BLOOD TYPE: 7300
PRODUCT BLOOD TYPE: 7300
PRODUCT BLOOD TYPE: 9500
PRODUCT BLOOD TYPE: 9500
PRODUCT CODE: NORMAL
PROT SERPL-MCNC: 5.4 G/DL (ref 6.4–8.2)
PROTHROMBIN TIME: 23.6 SECONDS (ref 9.8–12.8)
RBC # BLD AUTO: 2.78 X10*6/UL (ref 4–5.2)
RETICS #: 0.21 X10*6/UL (ref 0.02–0.08)
RETICS/RBC NFR AUTO: 7.7 % (ref 0.5–2)
SODIUM SERPL-SCNC: 139 MMOL/L (ref 136–145)
UNIT ABO: NORMAL
UNIT NUMBER: NORMAL
UNIT RH: NORMAL
UNIT VOLUME: 290
UNIT VOLUME: 350
WBC # BLD AUTO: 8.8 X10*3/UL (ref 4.4–11.3)
XM INTEP: NORMAL

## 2024-10-12 PROCEDURE — 85025 COMPLETE CBC W/AUTO DIFF WBC: CPT | Performed by: NURSE PRACTITIONER

## 2024-10-12 PROCEDURE — 94660 CPAP INITIATION&MGMT: CPT

## 2024-10-12 PROCEDURE — 99233 SBSQ HOSP IP/OBS HIGH 50: CPT | Performed by: PHYSICIAN ASSISTANT

## 2024-10-12 PROCEDURE — 2500000001 HC RX 250 WO HCPCS SELF ADMINISTERED DRUGS (ALT 637 FOR MEDICARE OP): Performed by: NURSE PRACTITIONER

## 2024-10-12 PROCEDURE — 2500000004 HC RX 250 GENERAL PHARMACY W/ HCPCS (ALT 636 FOR OP/ED): Performed by: NURSE PRACTITIONER

## 2024-10-12 PROCEDURE — 85610 PROTHROMBIN TIME: CPT | Performed by: NURSE PRACTITIONER

## 2024-10-12 PROCEDURE — 2500000004 HC RX 250 GENERAL PHARMACY W/ HCPCS (ALT 636 FOR OP/ED): Performed by: PHYSICIAN ASSISTANT

## 2024-10-12 PROCEDURE — 85045 AUTOMATED RETICULOCYTE COUNT: CPT

## 2024-10-12 PROCEDURE — 82565 ASSAY OF CREATININE: CPT | Performed by: NURSE PRACTITIONER

## 2024-10-12 PROCEDURE — 1170000001 HC PRIVATE ONCOLOGY ROOM DAILY

## 2024-10-12 PROCEDURE — 83615 LACTATE (LD) (LDH) ENZYME: CPT

## 2024-10-12 PROCEDURE — 2500000004 HC RX 250 GENERAL PHARMACY W/ HCPCS (ALT 636 FOR OP/ED)

## 2024-10-12 PROCEDURE — 2500000002 HC RX 250 W HCPCS SELF ADMINISTERED DRUGS (ALT 637 FOR MEDICARE OP, ALT 636 FOR OP/ED): Performed by: NURSE PRACTITIONER

## 2024-10-12 PROCEDURE — 83735 ASSAY OF MAGNESIUM: CPT | Performed by: NURSE PRACTITIONER

## 2024-10-12 PROCEDURE — 2500000005 HC RX 250 GENERAL PHARMACY W/O HCPCS: Performed by: NURSE PRACTITIONER

## 2024-10-12 PROCEDURE — 36415 COLL VENOUS BLD VENIPUNCTURE: CPT | Performed by: NURSE PRACTITIONER

## 2024-10-12 PROCEDURE — 2500000001 HC RX 250 WO HCPCS SELF ADMINISTERED DRUGS (ALT 637 FOR MEDICARE OP)

## 2024-10-12 RX ORDER — FUROSEMIDE 20 MG/1
20 TABLET ORAL EVERY OTHER DAY
Status: DISCONTINUED | OUTPATIENT
Start: 2024-10-13 | End: 2024-10-21 | Stop reason: HOSPADM

## 2024-10-12 RX ORDER — MAGNESIUM SULFATE HEPTAHYDRATE 40 MG/ML
4 INJECTION, SOLUTION INTRAVENOUS ONCE
Status: COMPLETED | OUTPATIENT
Start: 2024-10-12 | End: 2024-10-12

## 2024-10-12 ASSESSMENT — PAIN - FUNCTIONAL ASSESSMENT: PAIN_FUNCTIONAL_ASSESSMENT: 0-10

## 2024-10-12 ASSESSMENT — COGNITIVE AND FUNCTIONAL STATUS - GENERAL
STANDING UP FROM CHAIR USING ARMS: A LOT
DRESSING REGULAR LOWER BODY CLOTHING: A LITTLE
MOBILITY SCORE: 24
DRESSING REGULAR LOWER BODY CLOTHING: A LITTLE
MOVING TO AND FROM BED TO CHAIR: A LOT
EATING MEALS: A LITTLE
HELP NEEDED FOR BATHING: A LITTLE
PERSONAL GROOMING: A LITTLE
PERSONAL GROOMING: A LITTLE
CLIMB 3 TO 5 STEPS WITH RAILING: A LOT
DRESSING REGULAR UPPER BODY CLOTHING: A LITTLE
TURNING FROM BACK TO SIDE WHILE IN FLAT BAD: A LITTLE
TOILETING: A LITTLE
DAILY ACTIVITIY SCORE: 18
HELP NEEDED FOR BATHING: A LITTLE
WALKING IN HOSPITAL ROOM: A LOT
TOILETING: A LITTLE
DAILY ACTIVITIY SCORE: 18
DRESSING REGULAR UPPER BODY CLOTHING: A LITTLE
MOBILITY SCORE: 14
EATING MEALS: A LITTLE
MOVING FROM LYING ON BACK TO SITTING ON SIDE OF FLAT BED WITH BEDRAILS: A LITTLE

## 2024-10-12 ASSESSMENT — PAIN SCALES - GENERAL
PAINLEVEL_OUTOF10: 8
PAINLEVEL_OUTOF10: 8
PAINLEVEL_OUTOF10: 9
PAINLEVEL_OUTOF10: 8
PAINLEVEL_OUTOF10: 7
PAINLEVEL_OUTOF10: 8
PAINLEVEL_OUTOF10: 8

## 2024-10-12 NOTE — PROGRESS NOTES
"Senait Narvaez is a 54 y.o. female on day 32 of admission presenting with Sickle cell disease with crisis and other complication.    Subjective   Seen this morning at bedside, still c/o lower ext pain and edema has not significantly improved. We discussed restarting her home dose lasix  every other day. Encouraged to use CPAP at night. Otherwise denies shortness of breath, chest pain, abdominal pain, N/V/D, F/C.       Objective     Physical Exam  Constitutional:       Appearance: Normal appearance. She is obese.   HENT:      Mouth/Throat:      Mouth: Mucous membranes are moist.   Eyes:      Pupils: Pupils are equal, round, and reactive to light.   Cardiovascular:      Rate and Rhythm: Normal rate and regular rhythm.   Pulmonary:      Effort: Pulmonary effort is normal.      Breath sounds: Normal breath sounds. No rhonchi or rales.   Abdominal:      General: Abdomen is flat. Bowel sounds are normal.      Palpations: Abdomen is soft.   Musculoskeletal:      Cervical back: Normal range of motion and neck supple.      Right lower leg: Edema present.      Left lower leg: Edema present.      Comments: 2+ pitting edema of b/l LE   Skin:     General: Skin is warm.      Capillary Refill: Capillary refill takes less than 2 seconds.   Neurological:      General: No focal deficit present.      Mental Status: She is alert.   Psychiatric:         Mood and Affect: Mood normal.         Last Recorded Vitals  Blood pressure 112/71, pulse 73, temperature 36.1 °C (97 °F), temperature source Temporal, resp. rate 18, height 1.651 m (5' 5\"), weight 113 kg (248 lb 7.3 oz), SpO2 99%.  Intake/Output last 3 Shifts:  I/O last 3 completed shifts:  In: 730 (6.5 mL/kg) [P.O.:730]  Out: 2025 (18 mL/kg) [Urine:2025 (0.5 mL/kg/hr)]  Weight: 112.7 kg     Relevant Results    This patient has a central line   Reason for the central line remaining today? 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 Patient was subsequently intubated due to worsening lethargic and lack of respiratory compensation from significant metabolic acidosis with concern for ACS. Vancomycin and Zosyn started for empiric coverage, patient also started on pressors for BP support. Patient was transfused 2 units pRBCs and then underwent RBCEx on 9/11. Cardiology consulted for c/f NSTEMI vs demand ischemia (ST depressions and elevated troponin), rec treat as ACS. Course further c/b severe CHARLES with peak sCr of 5.4, and acute liver injury with severely elevated liver enzymes (ALT 4119, AST >10k). Liver US only remarkable for fatty infiltration, viral hepatitis panel negative. CHARLES and liver injury improved with supportive management. Pruritic rash noted 9/14, Derm consulted and took multiple biopsies 9/15. Rash then began to worsen to involve the groin, lateral thigh, and scalp with numerous tense bullae. Discontinued heparin given concerns for potential HIT, started bivalirudin. Patient was extubated on 9/16/2024. Started stress dose steroids given continued pressor requirements, pressors Dc'd 9/17. Patient transferred to [facility] 9/17, PT/OT rec SNF. Coumadin bridge was started 9/18. Given persistent rash with unremarkable labs, derm re-biopsied on 9/18, findings ultimately felt to be most c/w fixed drug eruption. Dermatology contacted for bx results (9/20) rec triamcinolone cream BID and interdry cloths. Leukocytosis uptrending 9/21, repeat infectious workup negative (CXR, Abd XR, blood cultures,  MRSA negative 9/21). UA with leuks, urine culture pending (9/21). On 9/25, per nursing and patient, rash seems to be worsening. Derm re-engaged recommended to apply vasoline to all eroded/denuded areas and continue triamcinolone cream to both the black plaques as well as the red areas across the trunk and thighs. 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 Wound care following. For warfarin dosing, per pharmacy, if INR between 2-3 can restart home warfarin 5mg daily, if INR < 2 restart bival. INR 3.4 on 9/25, warfarin stopped, repeat INR 2.8 9/26 and warfarin restarted. On 9/26, increased PO oxycodone and discontinued IV dilaudid in anticipation for discharge. Overnight on 9/29, brain attack called out of concern for change in mental status, no focal deficits noted, BAT cancelled and narcan administered x3 with improvement of mental status. Mentation improving as of 10/2, small dose oxy resumed. INR remains supratherapeutic 10/3 at 6.7, 5mg IV Vit K x1 given (ok per pharmacy) I/s/o ?bleeding from vagina vs wound excoriation and UA with +blood.  DC to SNF pending improvement in pain, skin, INR, and RBCEx. Restarted Lasix 20mg every other day. 10/12, stop bridging with Lovenox, INR 2.1, cont daily Coumadin.     # Waxing and waning AMS---Resolved   - Felt to be due to opioids i/s/o worsening renal function and c/b respiratory acidosis  - All opioids on hold (9/28-10/2 resumed small dose oxy)  - Keep supplemental O2 on board, but titrate to maintain SPO2 of 90-92% at most  - On CPAP at home, enforce nightly use to extent possible; may need BiPAP if unresolved    # CHARLES on CKD II, resolved   - Baseline ~ SCr <2, peak 5.4 this admission. 9/21 sCr 1.58 --> 1.13 (9/25)--> 1.14 (10/3) --> 1.13 (10/11)  - Likely pre-renal vs ATN given persistant hypotension over admission, on CKD II, now polyuric phase  - FeNA 0.4%  - Severe  hyperkalemia with previous peaked T waves on EKG, resolved  - s/p multiple K cocktails in MICU  - Encourage increased PO intake     # Purpuric rash, suspected fixed drug eruption  # Concern for possible heparin-induced thrombocytopenia (HIT) -resolved  - Purple, reticular pattern of plaques noted around the bilateral breasts, scalp and groin,  with tense bullae. Non-erythematous, no purulence  - Some initial concern for possible heparin-induced thrombocytopenia with concurrent skin findings. Discontinued heparin 9/16 and transitioned to bivalirudin  - 4 T score 0, PF4 w/ reflex BINA negative. Hematology ultimately consulted, felt there was low concern for HIT and signed off  - Differential diagnosis includes purpura secondary to infection versus coagulopathy versus vasculopathy versus small and/or medium vessel vasculitis versus calciphylaxis vs drug rash  - Dermatology consulted, work-up to date:  - Low Protein C activity  - ANCA, PR3 and MPO negative  - ISABELLE neg  - cryoglobulin labs- not enough specimen to analyze  - S/p skin punch biopsies x 2 9/15 and one on 9/18, showed SUPERFICIAL EPIDERMAL DEGENERATION WITH ERYTHROCYTE EXTRAVASATION WITH NEUTROPHILS. These findings could be seen secondary to an older trauma, or resolving erythema multiforme, a fixed drug eruption, Edgar-Christopher syndrome, or toxic epidermal necrolysis. Favor Multifocal Fixed Drug Eruption    - Dermatology contacted (9/20) about results, recommended adding vancomycin to allergy list and starting triamcinolone  - On 9/25, concern from pt and nursing that rash is not improving. Under breasts and groin still causing pain. Bilateral upper thighs and forehead rash improving   - Wound care re-consulted 9/25   - Re-engaged dermatology on 9/25 given worsening rash; recommended to apply vasoline to all eroded/denuded areas and continue triamcinolone cream to both the black plaques as well as the red areas across the trunk and thighs. Per dermatology,  erythema is mild and not palpable, it is most likely part of the drug eruption the patient has previously been known to have.     # Leukocytosis , improving  - Likely I/s/o rash vs reactive vs infectious process  - Leukocytosis noted on admission to MICU (WBC 24.8) --> 12.7 (9/25) --> 8.3 (10/3)  - Leukocytosis improved throughout hospital stay however started uptrending 9/20; improving as of 9/25   - Blood cultures 9/10, 9/12, 9/15 all NGTD  - Strep and Legionella Ag, MRSA nares negative  - CXR (9/21) largely unremarkable, again re-demonstrated perihilar prominence seen on prior XR  - Abdominal xray (9/21) unremarkable  - Repeat blood cultures x 2 (9/21) NGTD  - UA (9/21) with 500 LE, urine cx pending  - UA (9/30) +bacteria, +leuks, urine culture (9/29) mult organisms  - UA sent 10/3 with +blood, leuks, and WBC-Cx pending.       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  # ACS  - Previously managed through routine RBC exchanges q6 weeks, most recent RBCEx 3/1/24, per patient pausing on transfusions for time being  - OARRS reviewed, no aberrant behavior noted  - LDH>12,000 (now downtrending), haptoglobin <30, fibrinogen 318, retic % 5.2 on presentation, bili 10.7  - Leukocytosis 24.8 on admission to MICU  - CXR (9/10) persistent atelectasis/scarring in the left lower lung  - CT CAP (9/11) Diffuse mosaic attenuation of the lung parenchyma, which can be seen in the setting of small airway disease, pulmonary edema or acute infection  - Intubated upon arrival to MICU, now s/p extubation 9/16  - Started on vanc and zosyn (finished both courses prior to floor transfer)  - Procal elevated 7.09 (9/11)  - Blood cultures 9/10, 9/12, 9/15 all NGTD  - Strep and Legionella Ag, MRSA nares negative  - Trop peak 1431, cards rec treat as ACS (see below)  - S/p 2u pRBCs 9/10 on arrival to MICU  - S/p exchange transfusion 9/11 AM for ACS and repeat exchange on 10/8  - Care plan from 12/6/23- For mild to moderate pain: Dilaudid 0.5mg IVP q3h as  needed, for moderate to severe pain Dilaudid 1- 1.5mg q3h PRN  - On arrival to [facility], started on 0.6mg dilaudid IVP q3h PRN severe pain   - s/p 0.6mg dilaudid IVP q4h PRN breakthrough and 15mg PO oxycodone Q4 hrs severe pain (9/25)  - Per discussion with Dr. Whaley (9/25), okay to increase PO oxycodone to 15-20mg Q4hr to optimize pain for discharge to SNF.   - On 9/26, discontinued IV dilaudid and increased to 20mg PO oxycodone q4hrs for severe pain   - Opioids on hold as of 9/28 d/t AMS- resumed 2.5mg oxy q4hrs PRN severe pain on 10/2. ER oxy 10mg BID still held  - Bowel regimen for opioid induced constipation, zofran for opioid induced nausea, and benadrly for opioid induced pruritus  - c/w home Duloxetine 40mg daily, PO Zofran PRN, Folic Acid 1mg daily, and MVI daily     # ST depression with tropinemia, suspected 2/2 demand ischemia iso ACS  # Hx SVT  - Elevated troponins on admission, peak 1431 and now downtrending   - EKG with ST depressions, likely Type II MI due to vaso-occlusive crisis and anemia  - TTE 9/11: EF 60-65%, RV enlarged and reduced in function which appears stable when compared to last TTE (1/2024)  - Troponin leak is likely due to cardiac demand vs supply mismatch in the setting of worsening anemia and vaso-occlusive crisis  - Cardiology consulted, now signed off with indication to treat troponemia as ACS  - Lasix held in MICU 2/2 hypotension and CHARLES, can resume as able   - Follow-up with Cardiology outpt, FUV 2/13/25     # Acute liver injury, improving  # Direct hyperbilirubinuria, improving  - Suspected 2/2 hemodynamic instability versus sickle cell crisis  - On presentation to  MICU ALT 2611, AST 4727, T bili 10.2, INR 3.0; continues to improve  - CT with hepatic venous congestion, patient lacks gallbladder  - Liver US with Doppler: Diffuse fatty infiltration, unremarkable doppler interrogation of the liver  - EBV IgG positive, IgM negative  - CMV IgG positive, PCR negative  - Acute hepatitis  panel unremarkable  - Hepatology initially following, now signed off  - Will continue to monitor, trend daily CMP     # pHTN   - Initial c/f CTEPH as imaging findings with dilated PA, filling defects, and mosaicism  - VQ scan (6/13/23) with non anatomical basal defects and RUL defect due to scar--> not favoring CTEPH per Dr. Yi and Dr. 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 On lifelong anticoagulation, DOAC discouraged due to high risk of clotting   - home Coumadin 5mg daily initially HELD on admission to MICU 2/2 unstable course  - restarted coumadin 9/18 PM, will plan to bridge with bival, Ok'd by heme   - 9/20 INR 3.8, bival and home warfarin held. Pharmacy rec repeat INR 4 hours after bival was held. If the INR < 2, we can restart the bival, but if the INR 2-3, we can continue with warfarin monotherapy   - 9/20 repeat INR 3.7, pharmacy rec continuing to hold both medications  - 9/21 INR 3.2, per pharmacy - get repeat INR in AM 9/22, if INR between 2-3 can restart home warfarin 5mg daily  -INR 2.6 (9/24) after restarting Warfarin 5mg, will maintain INR goal of 2-3  - INR 3.4 (9/25). Held warfarin on 9/25. Repeat INR 9/26. Plan to restart if <3   - INR 2.8 (9/26), restarted warfarin, recheck INR 9/27   - INR 4.0 (9/30), held warfarin, repeat INR 10/1 ordered, plan to  restart if <3  - INR 6.8 (10/1), patient without any active bleeding, per attending and pharmacy, warfarin held   - INR 6.8 (10/2), patient without any active bleeding, per attending and pharmacy, warfarin held   - INR 6.7 (10/3), cont to hold warfarin.   - INR 1.5 (10/4) - continue to hold warfarin  - INR 1.9 (10/11)- Cont bridging Coumadin with Lovenox   - INR 2.1 (10/12), Lovenox DC and cont daily Coumadin 5mg   - Follows with Coumadin clinic outpatient    # CAN  - cont home CPAP   - cont home Albuterol PRN     # H/O Cauda Equine syndrome  - Follows Dr. Delacruz as outpatient  - no current issues      # DISPO  - Full code- confirmed on admission  - NOK: Tati Salgado (mother) (#[phone])  - PT/OT rec SNF, plan for rehab at [facility] likely Monday 10/14  - FUV Anim 10/7, Cardiology 2/13         I spent 60  minutes in the professional and overall care of this patient.    Assessment and plan as above discussed with attending physician, Dr. Tayo Gonzales PAMAYA

## 2024-10-12 NOTE — CARE PLAN
Pt will remain HDS and VSS by 10/12/24 at 0700    Problem: Pain - Adult  Goal: Verbalizes/displays adequate comfort level or baseline comfort level  Outcome: Progressing     Problem: Safety - Adult  Goal: Free from fall injury  Outcome: Progressing     Problem: Skin  Goal: Decreased wound size/increased tissue granulation at next dressing change  Outcome: Progressing  Goal: Participates in plan/prevention/treatment measures  Outcome: Progressing  Goal: Prevent/manage excess moisture  Outcome: Progressing  Goal: Prevent/minimize sheer/friction injuries  Outcome: Progressing  Goal: Promote/optimize nutrition  Outcome: Progressing  Goal: Promote skin healing  Outcome: Progressing     Problem: Knowledge Deficit  Goal: Patient/family/caregiver demonstrates understanding of disease process, treatment plan, medications, and discharge instructions  Outcome: Progressing     Problem: Pain  Goal: Takes deep breaths with improved pain control throughout the shift  Outcome: Progressing  Goal: Turns in bed with improved pain control throughout the shift  Outcome: Progressing  Goal: Walks with improved pain control throughout the shift  Outcome: Progressing  Goal: Performs ADL's with improved pain control throughout shift  Outcome: Progressing  Goal: Participates in PT with improved pain control throughout the shift  Outcome: Progressing  Goal: Free from opioid side effects throughout the shift  Outcome: Progressing  Goal: Free from acute confusion related to pain meds throughout the shift  Outcome: Progressing     Problem: Nutrition  Goal: Less than 5 days NPO/clear liquids  Outcome: Progressing  Goal: Oral intake greater than 50%  Outcome: Progressing  Goal: Oral intake greater 75%  Outcome: Progressing  Goal: Consume prescribed supplement  Outcome: Progressing  Goal: Adequate PO fluid intake  Outcome: Progressing  Goal: Nutrition support goals are met within 48 hrs  Outcome: Progressing  Goal: Nutrition support is meeting 75%  of nutrient needs  Outcome: Progressing  Goal: Tube feed tolerance  Outcome: Progressing  Goal: BG  mg/dL  Outcome: Progressing  Goal: Lab values WNL  Outcome: Progressing  Goal: Electrolytes WNL  Outcome: Progressing  Goal: Promote healing  Outcome: Progressing  Goal: Maintain stable weight  Outcome: Progressing  Goal: Reduce weight from edema/fluid  Outcome: Progressing  Goal: Gradual weight gain  Outcome: Progressing

## 2024-10-12 NOTE — ASSESSMENT & PLAN NOTE
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 Patient was subsequently intubated due to worsening lethargic and lack of respiratory compensation from significant metabolic acidosis with concern for ACS. Vancomycin and Zosyn started for empiric coverage, patient also started on pressors for BP support. Patient was transfused 2 units pRBCs and then underwent RBCEx on 9/11. Cardiology consulted for c/f NSTEMI vs demand ischemia (ST depressions and elevated troponin), rec treat as ACS. Course further c/b severe CHARLES with peak sCr of 5.4, and acute liver injury with severely elevated liver enzymes (ALT 4119, AST >10k). Liver US only remarkable for fatty infiltration, viral hepatitis panel negative. CHARLES and liver injury improved with supportive management. Pruritic rash noted 9/14, Derm consulted and took multiple biopsies 9/15. Rash then began to worsen to involve the groin, lateral thigh, and scalp with numerous tense bullae. Discontinued heparin given concerns for potential HIT, started bivalirudin. Patient was extubated on 9/16/2024. Started stress dose steroids given continued pressor requirements, pressors Dc'd 9/17. Patient transferred to [facility] 9/17, PT/OT rec SNF. Coumadin bridge was started 9/18. Given persistent rash with unremarkable labs, derm re-biopsied on 9/18, findings ultimately felt to be most c/w fixed drug eruption. Dermatology contacted for bx results (9/20) rec triamcinolone cream BID and interdry cloths. Leukocytosis uptrending 9/21, repeat infectious workup negative (CXR, Abd XR, blood cultures, MRSA negative 9/21).  UA with leuks, urine culture pending (9/21). On 9/25, per nursing and patient, rash seems to be worsening. Derm re-engaged recommended to apply vasoline to all eroded/denuded areas and continue triamcinolone cream to both the black plaques as well as the red areas across the trunk and thighs. 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 Wound care following. For warfarin dosing, per pharmacy, if INR between 2-3 can restart home warfarin 5mg daily, if INR < 2 restart bival. INR 3.4 on 9/25, warfarin stopped, repeat INR 2.8 9/26 and warfarin restarted. On 9/26, increased PO oxycodone and discontinued IV dilaudid in anticipation for discharge. Overnight on 9/29, brain attack called out of concern for change in mental status, no focal deficits noted, BAT cancelled and narcan administered x3 with improvement of mental status. Mentation improving as of 10/2, small dose oxy resumed. INR remains supratherapeutic 10/3 at 6.7, 5mg IV Vit K x1 given (ok per pharmacy) I/s/o ?bleeding from vagina vs wound excoriation and UA with +blood.  DC to SNF pending improvement in pain, skin, INR, and RBCEx. Restarted Lasix 20mg every other day. 10/12, stop bridging with Lovenox, INR 2.1, cont daily Coumadin.     # Waxing and waning AMS---Resolved   - Felt to be due to opioids i/s/o worsening renal function and c/b respiratory acidosis  - All opioids on hold (9/28-10/2 resumed small dose oxy)  - Keep supplemental O2 on board, but titrate to maintain SPO2 of 90-92% at most  - On CPAP at home, enforce nightly use to extent possible; may need BiPAP if unresolved    # CHARLES on CKD II, resolved   - Baseline ~ SCr <2, peak 5.4 this admission. 9/21 sCr 1.58 --> 1.13 (9/25)--> 1.14 (10/3) --> 1.13 (10/11)  - Likely pre-renal vs ATN given persistant hypotension over admission, on CKD II, now polyuric phase  - FeNA 0.4%  - Severe hyperkalemia with  previous peaked T waves on EKG, resolved  - s/p multiple K cocktails in MICU  - Encourage increased PO intake     # Purpuric rash, suspected fixed drug eruption  # Concern for possible heparin-induced thrombocytopenia (HIT) -resolved  - Purple, reticular pattern of plaques noted around the bilateral breasts, scalp and groin,  with tense bullae. Non-erythematous, no purulence  - Some initial concern for possible heparin-induced thrombocytopenia with concurrent skin findings. Discontinued heparin 9/16 and transitioned to bivalirudin  - 4 T score 0, PF4 w/ reflex BINA negative. Hematology ultimately consulted, felt there was low concern for HIT and signed off  - Differential diagnosis includes purpura secondary to infection versus coagulopathy versus vasculopathy versus small and/or medium vessel vasculitis versus calciphylaxis vs drug rash  - Dermatology consulted, work-up to date:  - Low Protein C activity  - ANCA, PR3 and MPO negative  - ISABELLE neg  - cryoglobulin labs- not enough specimen to analyze  - S/p skin punch biopsies x 2 9/15 and one on 9/18, showed SUPERFICIAL EPIDERMAL DEGENERATION WITH ERYTHROCYTE EXTRAVASATION WITH NEUTROPHILS. These findings could be seen secondary to an older trauma, or resolving erythema multiforme, a fixed drug eruption, Edgar-Christopher syndrome, or toxic epidermal necrolysis. Favor Multifocal Fixed Drug Eruption    - Dermatology contacted (9/20) about results, recommended adding vancomycin to allergy list and starting triamcinolone  - On 9/25, concern from pt and nursing that rash is not improving. Under breasts and groin still causing pain. Bilateral upper thighs and forehead rash improving   - Wound care re-consulted 9/25   - Re-engaged dermatology on 9/25 given worsening rash; recommended to apply vasoline to all eroded/denuded areas and continue triamcinolone cream to both the black plaques as well as the red areas across the trunk and thighs. Per dermatology, erythema is mild and  not palpable, it is most likely part of the drug eruption the patient has previously been known to have.     # Leukocytosis , improving  - Likely I/s/o rash vs reactive vs infectious process  - Leukocytosis noted on admission to MICU (WBC 24.8) --> 12.7 (9/25) --> 8.3 (10/3)  - Leukocytosis improved throughout hospital stay however started uptrending 9/20; improving as of 9/25   - Blood cultures 9/10, 9/12, 9/15 all NGTD  - Strep and Legionella Ag, MRSA nares negative  - CXR (9/21) largely unremarkable, again re-demonstrated perihilar prominence seen on prior XR  - Abdominal xray (9/21) unremarkable  - Repeat blood cultures x 2 (9/21) NGTD  - UA (9/21) with 500 LE, urine cx pending  - UA (9/30) +bacteria, +leuks, urine culture (9/29) mult organisms  - UA sent 10/3 with +blood, leuks, and WBC-Cx pending.       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  # ACS  - Previously managed through routine RBC exchanges q6 weeks, most recent RBCEx 3/1/24, per patient pausing on transfusions for time being  - OARRS reviewed, no aberrant behavior noted  - LDH>12,000 (now downtrending), haptoglobin <30, fibrinogen 318, retic % 5.2 on presentation, bili 10.7  - Leukocytosis 24.8 on admission to MICU  - CXR (9/10) persistent atelectasis/scarring in the left lower lung  - CT CAP (9/11) Diffuse mosaic attenuation of the lung parenchyma, which can be seen in the setting of small airway disease, pulmonary edema or acute infection  - Intubated upon arrival to MICU, now s/p extubation 9/16  - Started on vanc and zosyn (finished both courses prior to floor transfer)  - Procal elevated 7.09 (9/11)  - Blood cultures 9/10, 9/12, 9/15 all NGTD  - Strep and Legionella Ag, MRSA nares negative  - Trop peak 1431, cards rec treat as ACS (see below)  - S/p 2u pRBCs 9/10 on arrival to MICU  - S/p exchange transfusion 9/11 AM for ACS and repeat exchange on 10/8  - Care plan from 12/6/23- For mild to moderate pain: Dilaudid 0.5mg IVP q3h as needed, for moderate to  severe pain Dilaudid 1- 1.5mg q3h PRN  - On arrival to [facility], started on 0.6mg dilaudid IVP q3h PRN severe pain   - s/p 0.6mg dilaudid IVP q4h PRN breakthrough and 15mg PO oxycodone Q4 hrs severe pain (9/25)  - Per discussion with Dr. Whaley (9/25), okay to increase PO oxycodone to 15-20mg Q4hr to optimize pain for discharge to SNF.   - On 9/26, discontinued IV dilaudid and increased to 20mg PO oxycodone q4hrs for severe pain   - Opioids on hold as of 9/28 d/t AMS- resumed 2.5mg oxy q4hrs PRN severe pain on 10/2. ER oxy 10mg BID still held  - Bowel regimen for opioid induced constipation, zofran for opioid induced nausea, and benadrly for opioid induced pruritus  - c/w home Duloxetine 40mg daily, PO Zofran PRN, Folic Acid 1mg daily, and MVI daily     # ST depression with tropinemia, suspected 2/2 demand ischemia iso ACS  # Hx SVT  - Elevated troponins on admission, peak 1431 and now downtrending   - EKG with ST depressions, likely Type II MI due to vaso-occlusive crisis and anemia  - TTE 9/11: EF 60-65%, RV enlarged and reduced in function which appears stable when compared to last TTE (1/2024)  - Troponin leak is likely due to cardiac demand vs supply mismatch in the setting of worsening anemia and vaso-occlusive crisis  - Cardiology consulted, now signed off with indication to treat troponemia as ACS  - Lasix held in MICU 2/2 hypotension and CHARLES, can resume as able   - Follow-up with Cardiology outpt, FUV 2/13/25     # Acute liver injury, improving  # Direct hyperbilirubinuria, improving  - Suspected 2/2 hemodynamic instability versus sickle cell crisis  - [facility] AST 4727, T bili 10.2, INR 3.0; continues to improve  - CT with hepatic venous congestion, patient lacks gallbladder  - Liver US with Doppler: Diffuse fatty infiltration, unremarkable doppler interrogation of the liver  - EBV IgG positive, IgM negative  - CMV IgG positive, PCR negative  - Acute hepatitis panel unremarkable  -  Hepatology initially following, now signed off  - Will continue to monitor, trend daily CMP     # pHTN   - Initial c/f CTEPH as imaging findings with dilated PA, filling defects, and mosaicism  - VQ scan (6/13/23) with non anatomical basal defects and RUL defect due to scar--> not favoring CTEPH per Dr. Yi and Dr. 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 On lifelong anticoagulation, DOAC discouraged due to high risk of clotting   - home Coumadin 5mg daily initially HELD on admission to MICU 2/2 unstable course  - restarted coumadin 9/18 PM, will plan to bridge with bival, Ok'd by heme   - 9/20 INR 3.8, bival and home warfarin held. Pharmacy rec repeat INR 4 hours after bival was held. If the INR < 2, we can restart the bival, but if the INR 2-3, we can continue with warfarin monotherapy   - 9/20 repeat INR 3.7, pharmacy rec continuing to hold both medications  - 9/21 INR 3.2, per pharmacy - get repeat INR in AM 9/22, if INR between 2-3 can restart home warfarin 5mg daily  -INR 2.6 (9/24) after restarting Warfarin 5mg, will maintain INR goal of 2-3  - INR 3.4 (9/25). Held warfarin on 9/25. Repeat INR 9/26. Plan to restart if <3   - INR 2.8 (9/26), restarted warfarin, recheck INR 9/27   - INR 4.0 (9/30), held warfarin, repeat INR 10/1 ordered, plan to restart if <3  - INR  6.8 (10/1), patient without any active bleeding, per attending and pharmacy, warfarin held   - INR 6.8 (10/2), patient without any active bleeding, per attending and pharmacy, warfarin held   - INR 6.7 (10/3), cont to hold warfarin.   - INR 1.5 (10/4) - continue to hold warfarin  - INR 1.9 (10/11)- Cont bridging Coumadin with Lovenox   - INR 2.1 (10/12), Lovenox DC and cont daily Coumadin 5mg   - Follows with Coumadin clinic outpatient    # CAN  - cont home CPAP   - cont home Albuterol PRN     # H/O Cauda Equine syndrome  - Follows Dr. Delacruz as outpatient  - no current issues      # DISPO  - Full code- confirmed on admission  - NOK: Tati Salgado (mother) (#[phone])  - PT/OT rec SNF, plan for rehab at [facility] likely Monday 10/14  - FUV Anim 10/7, Cardiology 2/13

## 2024-10-12 NOTE — CARE PLAN
The patient's goals for the shift include      The clinical goals for the shift include pt will remain injury free    Problem: Pain - Adult  Goal: Verbalizes/displays adequate comfort level or baseline comfort level  Outcome: Progressing     Problem: Safety - Adult  Goal: Free from fall injury  Outcome: Progressing     Problem: Skin  Goal: Decreased wound size/increased tissue granulation at next dressing change  Outcome: Progressing  Flowsheets (Taken 10/12/2024 0714)  Decreased wound size/increased tissue granulation at next dressing change: Promote sleep for wound healing  Goal: Participates in plan/prevention/treatment measures  Outcome: Progressing  Flowsheets (Taken 10/12/2024 0714)  Participates in plan/prevention/treatment measures: Elevate heels  Goal: Prevent/manage excess moisture  Outcome: Progressing  Flowsheets (Taken 10/12/2024 0714)  Prevent/manage excess moisture:   Cleanse incontinence/protect with barrier cream   Moisturize dry skin  Goal: Prevent/minimize sheer/friction injuries  Outcome: Progressing  Flowsheets (Taken 10/12/2024 0714)  Prevent/minimize sheer/friction injuries: Increase activity/out of bed for meals  Goal: Promote/optimize nutrition  Outcome: Progressing  Flowsheets (Taken 10/12/2024 0714)  Promote/optimize nutrition: Offer water/supplements/favorite foods  Goal: Promote skin healing  Outcome: Progressing  Flowsheets (Taken 10/12/2024 0714)  Promote skin healing: Assess skin/pad under line(s)/device(s)     Problem: Knowledge Deficit  Goal: Patient/family/caregiver demonstrates understanding of disease process, treatment plan, medications, and discharge instructions  Outcome: Progressing     Problem: Mechanical Ventilation  Goal: Patient Will Maintain Patent Airway  Outcome: Progressing  Goal: Oral health is maintained or improved  Outcome: Progressing  Goal: Tracheostomy will be managed safely  Outcome: Progressing  Goal: ET tube will be managed safely  Outcome:  Progressing  Goal: Ability to express needs and understand communication  Outcome: Progressing  Goal: Mobility/activity is maintained at optimum level for patient  Outcome: Progressing     Problem: Pain  Goal: Takes deep breaths with improved pain control throughout the shift  Outcome: Progressing  Goal: Turns in bed with improved pain control throughout the shift  Outcome: Progressing  Goal: Walks with improved pain control throughout the shift  Outcome: Progressing  Goal: Performs ADL's with improved pain control throughout shift  Outcome: Progressing  Goal: Participates in PT with improved pain control throughout the shift  Outcome: Progressing  Goal: Free from opioid side effects throughout the shift  Outcome: Progressing  Goal: Free from acute confusion related to pain meds throughout the shift  Outcome: Progressing     Problem: Nutrition  Goal: Less than 5 days NPO/clear liquids  Outcome: Progressing  Goal: Oral intake greater than 50%  Outcome: Progressing  Goal: Oral intake greater 75%  Outcome: Progressing  Goal: Consume prescribed supplement  Outcome: Progressing  Goal: Adequate PO fluid intake  Outcome: Progressing  Goal: Nutrition support goals are met within 48 hrs  Outcome: Progressing  Goal: Nutrition support is meeting 75% of nutrient needs  Outcome: Progressing  Goal: Tube feed tolerance  Outcome: Progressing  Goal: BG  mg/dL  Outcome: Progressing  Goal: Lab values WNL  Outcome: Progressing  Goal: Electrolytes WNL  Outcome: Progressing  Goal: Promote healing  Outcome: Progressing  Goal: Maintain stable weight  Outcome: Progressing  Goal: Reduce weight from edema/fluid  Outcome: Progressing  Goal: Gradual weight gain  Outcome: Progressing  Goal: Improve ostomy output  Outcome: Progressing     Problem: Fall/Injury  Goal: Not fall by end of shift  Outcome: Progressing  Goal: Be free from injury by end of the shift  Outcome: Progressing  Goal: Verbalize understanding of personal risk factors for  fall in the hospital  Outcome: Progressing  Goal: Verbalize understanding of risk factor reduction measures to prevent injury from fall in the home  Outcome: Progressing  Goal: Use assistive devices by end of the shift  Outcome: Progressing  Goal: Pace activities to prevent fatigue by end of the shift  Outcome: Progressing

## 2024-10-13 ENCOUNTER — APPOINTMENT (OUTPATIENT)
Dept: RADIOLOGY | Facility: HOSPITAL | Age: 54
DRG: 811 | End: 2024-10-13
Payer: COMMERCIAL

## 2024-10-13 VITALS
RESPIRATION RATE: 18 BRPM | HEIGHT: 65 IN | SYSTOLIC BLOOD PRESSURE: 108 MMHG | DIASTOLIC BLOOD PRESSURE: 63 MMHG | OXYGEN SATURATION: 97 % | HEART RATE: 81 BPM | TEMPERATURE: 97.3 F | BODY MASS INDEX: 41.4 KG/M2 | WEIGHT: 248.46 LBS

## 2024-10-13 LAB
ALBUMIN SERPL BCP-MCNC: 2.1 G/DL (ref 3.4–5)
ALP SERPL-CCNC: 144 U/L (ref 33–110)
ALT SERPL W P-5'-P-CCNC: 18 U/L (ref 7–45)
ANION GAP SERPL CALC-SCNC: 7 MMOL/L (ref 10–20)
AST SERPL W P-5'-P-CCNC: 34 U/L (ref 9–39)
BASOPHILS # BLD AUTO: 0.03 X10*3/UL (ref 0–0.1)
BASOPHILS NFR BLD AUTO: 0.4 %
BILIRUB SERPL-MCNC: 1.3 MG/DL (ref 0–1.2)
BUN SERPL-MCNC: 13 MG/DL (ref 6–23)
CALCIUM SERPL-MCNC: 8.1 MG/DL (ref 8.6–10.6)
CHLORIDE SERPL-SCNC: 97 MMOL/L (ref 98–107)
CO2 SERPL-SCNC: 36 MMOL/L (ref 21–32)
CREAT SERPL-MCNC: 1.08 MG/DL (ref 0.5–1.05)
EGFRCR SERPLBLD CKD-EPI 2021: 61 ML/MIN/1.73M*2
EOSINOPHIL # BLD AUTO: 0.14 X10*3/UL (ref 0–0.7)
EOSINOPHIL NFR BLD AUTO: 1.9 %
ERYTHROCYTE [DISTWIDTH] IN BLOOD BY AUTOMATED COUNT: 17.9 % (ref 11.5–14.5)
GLUCOSE SERPL-MCNC: 84 MG/DL (ref 74–99)
HCT VFR BLD AUTO: 25.3 % (ref 36–46)
HGB BLD-MCNC: 8.1 G/DL (ref 12–16)
HGB RETIC QN: 32 PG (ref 28–38)
IMM GRANULOCYTES # BLD AUTO: 0.04 X10*3/UL (ref 0–0.7)
IMM GRANULOCYTES NFR BLD AUTO: 0.6 % (ref 0–0.9)
IMMATURE RETIC FRACTION: 21.3 %
INR PPP: 1.9 (ref 0.9–1.1)
LDH SERPL L TO P-CCNC: 232 U/L (ref 84–246)
LYMPHOCYTES # BLD AUTO: 1.27 X10*3/UL (ref 1.2–4.8)
LYMPHOCYTES NFR BLD AUTO: 17.6 %
MAGNESIUM SERPL-MCNC: 1.99 MG/DL (ref 1.6–2.4)
MCH RBC QN AUTO: 29.2 PG (ref 26–34)
MCHC RBC AUTO-ENTMCNC: 32 G/DL (ref 32–36)
MCV RBC AUTO: 91 FL (ref 80–100)
MONOCYTES # BLD AUTO: 0.9 X10*3/UL (ref 0.1–1)
MONOCYTES NFR BLD AUTO: 12.5 %
NEUTROPHILS # BLD AUTO: 4.83 X10*3/UL (ref 1.2–7.7)
NEUTROPHILS NFR BLD AUTO: 67 %
NRBC BLD-RTO: 5 /100 WBCS (ref 0–0)
PLATELET # BLD AUTO: 232 X10*3/UL (ref 150–450)
POTASSIUM SERPL-SCNC: 4.4 MMOL/L (ref 3.5–5.3)
PROT SERPL-MCNC: 5.2 G/DL (ref 6.4–8.2)
PROTHROMBIN TIME: 22.1 SECONDS (ref 9.8–12.8)
RBC # BLD AUTO: 2.77 X10*6/UL (ref 4–5.2)
RETICS #: 0.2 X10*6/UL (ref 0.02–0.08)
RETICS/RBC NFR AUTO: 7.2 % (ref 0.5–2)
SODIUM SERPL-SCNC: 136 MMOL/L (ref 136–145)
WBC # BLD AUTO: 7.2 X10*3/UL (ref 4.4–11.3)

## 2024-10-13 PROCEDURE — 99233 SBSQ HOSP IP/OBS HIGH 50: CPT

## 2024-10-13 PROCEDURE — 85610 PROTHROMBIN TIME: CPT | Performed by: NURSE PRACTITIONER

## 2024-10-13 PROCEDURE — 2500000005 HC RX 250 GENERAL PHARMACY W/O HCPCS: Performed by: NURSE PRACTITIONER

## 2024-10-13 PROCEDURE — 2500000001 HC RX 250 WO HCPCS SELF ADMINISTERED DRUGS (ALT 637 FOR MEDICARE OP): Performed by: PHYSICIAN ASSISTANT

## 2024-10-13 PROCEDURE — 2550000001 HC RX 255 CONTRASTS: Performed by: INTERNAL MEDICINE

## 2024-10-13 PROCEDURE — 83615 LACTATE (LD) (LDH) ENZYME: CPT

## 2024-10-13 PROCEDURE — 85045 AUTOMATED RETICULOCYTE COUNT: CPT

## 2024-10-13 PROCEDURE — 2500000001 HC RX 250 WO HCPCS SELF ADMINISTERED DRUGS (ALT 637 FOR MEDICARE OP)

## 2024-10-13 PROCEDURE — 36415 COLL VENOUS BLD VENIPUNCTURE: CPT | Performed by: PHYSICIAN ASSISTANT

## 2024-10-13 PROCEDURE — 2500000002 HC RX 250 W HCPCS SELF ADMINISTERED DRUGS (ALT 637 FOR MEDICARE OP, ALT 636 FOR OP/ED): Performed by: NURSE PRACTITIONER

## 2024-10-13 PROCEDURE — 73701 CT LOWER EXTREMITY W/DYE: CPT | Mod: LT

## 2024-10-13 PROCEDURE — 83735 ASSAY OF MAGNESIUM: CPT | Performed by: PHYSICIAN ASSISTANT

## 2024-10-13 PROCEDURE — 1170000001 HC PRIVATE ONCOLOGY ROOM DAILY

## 2024-10-13 PROCEDURE — 2500000001 HC RX 250 WO HCPCS SELF ADMINISTERED DRUGS (ALT 637 FOR MEDICARE OP): Performed by: NURSE PRACTITIONER

## 2024-10-13 PROCEDURE — 85025 COMPLETE CBC W/AUTO DIFF WBC: CPT | Performed by: PHYSICIAN ASSISTANT

## 2024-10-13 PROCEDURE — 97535 SELF CARE MNGMENT TRAINING: CPT | Mod: GO

## 2024-10-13 PROCEDURE — 73701 CT LOWER EXTREMITY W/DYE: CPT | Mod: LEFT SIDE | Performed by: STUDENT IN AN ORGANIZED HEALTH CARE EDUCATION/TRAINING PROGRAM

## 2024-10-13 PROCEDURE — 80053 COMPREHEN METABOLIC PANEL: CPT | Performed by: NURSE PRACTITIONER

## 2024-10-13 RX ORDER — OXYCODONE HYDROCHLORIDE 10 MG/1
10 TABLET ORAL EVERY 4 HOURS PRN
Qty: 18 TABLET | Refills: 0 | Status: SHIPPED | OUTPATIENT
Start: 2024-10-13 | End: 2024-10-21

## 2024-10-13 ASSESSMENT — PAIN SCALES - GENERAL
PAINLEVEL_OUTOF10: 7
PAINLEVEL_OUTOF10: 8
PAINLEVEL_OUTOF10: 8
PAINLEVEL_OUTOF10: 7
PAINLEVEL_OUTOF10: 8
PAINLEVEL_OUTOF10: 0 - NO PAIN

## 2024-10-13 ASSESSMENT — COGNITIVE AND FUNCTIONAL STATUS - GENERAL
MOVING FROM LYING ON BACK TO SITTING ON SIDE OF FLAT BED WITH BEDRAILS: A LITTLE
HELP NEEDED FOR BATHING: A LITTLE
DRESSING REGULAR UPPER BODY CLOTHING: A LITTLE
DAILY ACTIVITIY SCORE: 16
TOILETING: A LITTLE
PERSONAL GROOMING: A LITTLE
WALKING IN HOSPITAL ROOM: A LOT
PERSONAL GROOMING: A LITTLE
HELP NEEDED FOR BATHING: A LITTLE
MOBILITY SCORE: 14
MOVING TO AND FROM BED TO CHAIR: A LOT
TURNING FROM BACK TO SIDE WHILE IN FLAT BAD: A LITTLE
CLIMB 3 TO 5 STEPS WITH RAILING: A LOT
WALKING IN HOSPITAL ROOM: A LOT
DAILY ACTIVITIY SCORE: 18
TURNING FROM BACK TO SIDE WHILE IN FLAT BAD: A LITTLE
DRESSING REGULAR UPPER BODY CLOTHING: A LITTLE
DRESSING REGULAR UPPER BODY CLOTHING: A LITTLE
HELP NEEDED FOR BATHING: A LOT
DRESSING REGULAR LOWER BODY CLOTHING: A LITTLE
STANDING UP FROM CHAIR USING ARMS: A LOT
MOVING TO AND FROM BED TO CHAIR: A LOT
STANDING UP FROM CHAIR USING ARMS: A LOT
EATING MEALS: A LITTLE
DRESSING REGULAR LOWER BODY CLOTHING: A LOT
DAILY ACTIVITIY SCORE: 18
EATING MEALS: A LITTLE
CLIMB 3 TO 5 STEPS WITH RAILING: A LOT
DRESSING REGULAR LOWER BODY CLOTHING: A LITTLE
TOILETING: A LOT
TOILETING: A LITTLE
MOBILITY SCORE: 14
MOVING FROM LYING ON BACK TO SITTING ON SIDE OF FLAT BED WITH BEDRAILS: A LITTLE
PERSONAL GROOMING: A LITTLE

## 2024-10-13 ASSESSMENT — PAIN - FUNCTIONAL ASSESSMENT
PAIN_FUNCTIONAL_ASSESSMENT: UNABLE TO SELF-REPORT
PAIN_FUNCTIONAL_ASSESSMENT: 0-10
PAIN_FUNCTIONAL_ASSESSMENT: 0-10

## 2024-10-13 ASSESSMENT — PAIN DESCRIPTION - DESCRIPTORS: DESCRIPTORS: CRAMPING

## 2024-10-13 ASSESSMENT — ACTIVITIES OF DAILY LIVING (ADL): HOME_MANAGEMENT_TIME_ENTRY: 12

## 2024-10-13 NOTE — ASSESSMENT & PLAN NOTE
Senait Narvaez is a 54 year-old female with a PMHx of Hgb SC disease (previously on exchange transfusions q4-6 weeks, last transfusion 3/1/24), hx of SVC syndrome, recurrent DVT/PE (on lifelong Coumadin), pHTN (6/2023), cauda equina with saddle numbness, hx SVT, fibromyalgia, Raynaud's disease, CKD II (baseline SCr~<2) and CAN who presented to [facility] ED for diffuse pain and lethargy, then transferred to  MICU for hemodynamic instability. Patient was subsequently intubated due to worsening lethargic and lack of respiratory compensation from significant metabolic acidosis with concern for ACS. Vancomycin and Zosyn started for empiric coverage, patient also started on pressors for BP support. Patient was transfused 2 units pRBCs and then underwent RBCEx on 9/11. Cardiology consulted for c/f NSTEMI vs demand ischemia (ST depressions and elevated troponin), rec treat as ACS. Course further c/b severe CHARLES with peak sCr of 5.4, and acute liver injury with severely elevated liver enzymes (ALT 4119, AST >10k). Liver US only remarkable for fatty infiltration, viral hepatitis panel negative. CHARLES and liver injury improved with supportive management. Pruritic rash noted 9/14, Derm consulted and took multiple biopsies 9/15. Rash then began to worsen to involve the groin, lateral thigh, and scalp with numerous tense bullae. Discontinued heparin given concerns for potential HIT, started bivalirudin. However PF4 was negative. Patient was extubated on 9/16/2024. Started stress dose steroids given continued pressor requirements, pressors Dc'd 9/17.     Patient transferred to [facility] 9/17, PT/OT rec SNF. Coumadin bridge was started 9/18. Given persistent rash with unremarkable labs, derm re-biopsied on 9/18, findings ultimately felt to be most c/w fixed drug eruption. Dermatology contacted for bx results (9/20) rec triamcinolone cream BID and interdry cloths. Leukocytosis uptrending 9/21, repeat infectious workup negative (CXR, Abd XR, blood  cultures, MRSA negative 9/21). UA with leuks. On 9/25, per nursing and patient, rash seems to be worsening. Derm re-engaged recommended to apply vasoline to all eroded/denuded areas and continue triamcinolone cream to both the black plaques as well as the red areas across the trunk and thighs. 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 Wound care following. For warfarin dosing, per pharmacy, if INR between 2-3 can restart home warfarin 5mg daily, if INR < 2 restart bival. INR 3.4 on 9/25, warfarin stopped, repeat INR 2.8 9/26 and warfarin restarted. On 9/26, increased PO oxycodone and discontinued IV dilaudid in anticipation for discharge. Overnight on 9/29, brain attack called out of concern for change in mental status, no focal deficits noted, BAT cancelled and narcan administered x3 with improvement of mental status. Mentation improved as of 10/2, small dose oxy resumed. INR supratherapeutic 10/3 at 6.7, 5mg IV Vit K x1 given (ok per pharmacy) I/s/o ?bleeding from vagina vs wound excoriation and UA with +blood. INR  down to 1.5 (10/4). Plan for RBCEx prior to dc per Dr. Whaley, planned 10/8. 10/5 ordered duplex BLE as pt c/o severe LLE pain and swelling which was negative. Left ankle x-ray (10/8) negative for acute fractures, showed soft tissue edema. 10/5 started heparin drip (per attending) given normal INR while pending apheresis line placement (previous HIT workup with negative PF4). 10/7 started bridge back to coumadin with lovenox after apheresis line. Continue therapeutic Lovenox 120mg BID with Coumadin 5mg nightly x5 days and until INR is in therapeutic range x24 hours. Home Coumadin DC to SNF likely 10/14 pending precert approval.      # Waxing and waning AMS-- resolved   - Felt to be due to opioids i/s/o worsening renal function and c/b respiratory acidosis  - All opioids on hold (9/28-10/2)  - 10/2 resumed  oxycodone 10mg q3 hours as AMS now resolved   - continue to hold extended release oxycontin   - Keep supplemental O2 on board, but titrate to maintain SPO2 of 90-92% at most  - On CPAP at home, enforce nightly use to extent possible; may need BiPAP if unresolved     # CHARLES on CKD II   - Baseline ~ SCr <2, peak 5.4 this admission. 9/21 sCr 1.58 --> 1.13 (9/25)--> 1.14 (10/3)--> 0.96 (10/6)--> 0.99 (10/7) --> 0.97 (10/8) --> 1.30 (10/10)  --> 1.08 (10/13)   - Likely pre-renal vs ATN given persistant hypotension over admission, on CKD II, now polyuric phase  - FeNA 0.4%  - Severe hyperkalemia with previous peaked T waves on EKG, resolved  - Nephrology following, now signed off   - s/p multiple K cocktails in MICU  - Encourage increased PO intake       # Purpuric rash, suspected fixed drug eruption  # Concern for possible heparin-induced thrombocytopenia (HIT) -resolved  - Purple, reticular pattern of plaques noted around the bilateral breasts, scalp and groin,  with tense bullae. Non-erythematous, no purulence  - Some initial concern for possible heparin-induced thrombocytopenia with concurrent skin findings. Discontinued heparin 9/16 and transitioned to bivalirudin  - 4 T score 0, PF4 w/ reflex BINA negative. Hematology ultimately consulted, felt there was low concern for HIT and signed off  - Differential diagnosis includes purpura secondary to infection versus coagulopathy versus vasculopathy versus small and/or medium vessel vasculitis versus calciphylaxis vs drug rash  - Dermatology consulted, work-up to date:  - Low Protein C activity  - ANCA, PR3 and MPO negative  - ISABELLE neg  - cryoglobulin labs- not enough specimen to analyze  - S/p skin punch biopsies x 2 9/15 and one on 9/18, showed SUPERFICIAL EPIDERMAL DEGENERATION WITH ERYTHROCYTE EXTRAVASATION WITH NEUTROPHILS. These findings could be seen secondary to an older trauma, or resolving erythema multiforme, a fixed drug eruption, Edgar-Christopher syndrome, or  toxic epidermal necrolysis. Favor Multifocal Fixed Drug Eruption    - Dermatology contacted (9/20) about results, recommended adding vancomycin to allergy list and starting triamcinolone  - Continue triamcinolone 0.1% cream BID (9/20-current) per derm recs  - On 9/25, concern from pt and nursing that rash is not improving. Under breasts and groin still causing pain. Bilateral upper thighs and forehead rash improving   - Wound care re-consulted 9/25, s/o 10/3 as derm gave recs below (wound pictures documented in WC note from 10/3)  - Re-engaged dermatology on 9/25 given worsening rash; recommended to apply vasoline to all eroded/denuded areas and continue triamcinolone cream to both the black plaques as well as the red areas across the trunk and thighs. Per dermatology, erythema is mild and not palpable, it is most likely part of the drug eruption the patient has previously been known to have     # Leukocytosis-- resolved   - Likely I/s/o rash vs reactive vs infectious process  - Leukocytosis noted on admission to MICU (WBC 24.8) --> 12.7 (9/25) --> 8.3 (10/3) --> 7.2 (10/13)   - Leukocytosis improved throughout hospital stay however started uptrending 9/20; now improving on 9/25   - Blood cultures 9/10, 9/12, 9/15 all NGTD  - Strep and Legionella Ag, MRSA nares negative  - CXR (9/21) largely unremarkable, again re-demonstrated perihilar prominence seen on prior XR  - Abdominal xray (9/21) unremarkable  - Repeat blood cultures x 2 (9/21) NGTD  - UA (9/21) with 500 LE  - UA (9/30) +bacteria, +leuks, urine culture (9/29) mult organisms  - UA sent 10/3 with +blood, leuks, and WBC- not reflex to cx so UCX added on to 10/3 UA and again showed multiple organisms  - Will hold off tx for UTI for now as pt asymptomatic and leukocytosis improved. 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  # ACS  - Previously managed through routine RBC exchanges q6 weeks, most recent RBCEx 3/1/24, per patient pausing  on transfusions for time being  - OARRS reviewed, no aberrant behavior noted  - LDH>12,000 (now downtrending), haptoglobin <30, fibrinogen 318, retic % 5.2 on presentation, bili 10.7  - Leukocytosis 24.8 on admission to MICU  - CXR (9/10) persistent atelectasis/scarring in the left lower lung  - CT CAP (9/11) Diffuse mosaic attenuation of the lung parenchyma, which can be seen in the setting of small airway disease, pulmonary edema or acute infection  - Intubated upon arrival to MICU, now s/p extubation 9/16  - s/p Vanc and Zosyn (finished both courses prior to floor transfer)  - Procal elevated 7.09 (9/11)  - Blood cultures 9/10, 9/12, 9/15 all NGTD  - Strep and Legionella Ag, MRSA nares negative  - Trop peak 1431, cards rec treat as ACS (see below)  - [facility]  - S/p exchange transfusion 9/11 AM for ACS  - S/p routine exchange transfusion 10/8 per Dr. Whaley   - Care plan from 12/6/23- For mild to moderate pain: Dilaudid 0.5mg IVP q3h as needed, for moderate to severe pain Dilaudid 1- 1.5mg q3h PRN  - On arrival to [facility], started on 0.6mg dilaudid IVP q3h PRN severe pain   - s/p 0.6mg dilaudid IVP q4h PRN breakthrough and 15mg PO oxycodone Q4 hrs severe pain (9/25)  - Per discussion with Dr. Whaley (9/25), okay to increase PO oxycodone to 15-20mg Q4hr to optimize pain for discharge to SNF.   - On 9/26, discontinued IV dilaudid and increased to 20mg PO oxycodone q4hrs for severe pain   - Opioids on hold as of 9/28 d/t AMS- resumed 2.5mg oxy q4hrs PRN severe pain on 10/2. ER oxy 10mg BID still held  - (10/5- current) increased pain reg as AMS resolved--> continue oxycodone 10mg q3hrs PRN severe pain  - Continue to hold ER Oxycontin indefinitely as was major contributor to AMS (was started in ICU and was not on this prior to admission)  - Bowel regimen for opioid induced constipation with Senna 2tabs BID, Miralax BID, and dulcolax suppository 10mg PRN constipation  - Zofran for opioid induced  nausea, no benadryl ordered (assumed from previous AMS but also no reports of opioid-induced pruritus)  - Continue home Folic Acid 1mg daily, continue thiamine 100mg daily     # ST depression with tropinemia, suspected 2/2 demand ischemia iso ACS  # Hx SVT  - Elevated troponins on admission, peak 1431 and now downtrending   - EKG with ST depressions, likely Type II MI due to vaso-occlusive crisis and anemia  - TTE 9/11: EF 60-65%, RV enlarged and reduced in function which appears stable when compared to last TTE (1/2024)  - Troponin leak is likely due to cardiac demand vs supply mismatch in the setting of worsening anemia and vaso-occlusive crisis  - Cardiology consulted, now signed off with indication to treat troponemia as ACS  - Lasix held in MICU 2/2 hypotension and CHARLES, can resume as able   - Metop 12.5mg BID held in MICU 2/2 hypotension- BP and HR continue to remain stable, holding off on restarting for now  - s/p 1x dose of po lasix 20mg on 10/9 and s/p IVP 20mg lasix on 10/10 for volume overload, plan to resume home dose lasix once LE edema back to patient's baseline   - Follow-up with Cardiology outpt, FUV 2/13/25     # Acute liver injury, improving  # Direct hyperbilirubinuria, improving  - Suspected 2/2 hemodynamic instability versus sickle cell crisis  - On presentation to  MICU ALT 2611, AST 4727, T bili 10.2, INR 3.0; continues to improve  - CT with hepatic venous congestion, patient lacks gallbladder  - Liver US with Doppler: Diffuse fatty infiltration, unremarkable doppler interrogation of the liver  - EBV IgG positive, IgM negative  - CMV IgG positive, PCR negative  - Acute hepatitis panel unremarkable  - Hepatology initially following, now signed off  - Will continue to monitor, trend daily CMP     # pHTN   - Initial c/f CTEPH as imaging findings with dilated PA, filling defects, and mosaicism  - VQ scan (6/13/23) with non anatomical basal defects and RUL defect due to scar--> not favoring CTEPH  per Dr. Yi and Dr. 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 On lifelong anticoagulation, DOAC discouraged due to high risk of clotting   - Home Coumadin 5mg daily initially HELD on admission to MICU 2/2 unstable course  - Restarted coumadin 9/18 PM, will plan to bridge with bival, Ok'd by heme   - 9/20 INR 3.8, bival and home warfarin held. Pharmacy rec repeat INR 4 hours after bival was held. If the INR < 2, we can restart the bival, but if the INR 2-3, we can continue with warfarin monotherapy   - 9/20 repeat INR 3.7, pharmacy rec continuing to hold both medications  - 9/21 INR 3.2, per pharmacy - get repeat INR in AM 9/22, if INR between 2-3 can restart home warfarin 5mg daily  -INR 2.6 (9/24) after restarting Warfarin 5mg, will maintain INR goal of 2-3  - INR 3.4 (9/25). Held warfarin on 9/25. Repeat INR 9/26. Plan to restart if <3   - INR 2.8 (9/26), restarted warfarin, recheck INR 9/27   - INR 4.0 (9/30), held warfarin, repeat INR 10/1 ordered, plan to restart if <3  - INR 6.8 (10/1), patient without any active bleeding, per attending and pharmacy, warfarin held   - INR 6.8 (10/2), patient without any active bleeding, per attending and pharmacy, warfarin held   - INR 6.7 (10/3), cont to hold warfarin. Ordered 5mg IV Vit K x1 (ok per pharmacy) d/t ?bleeding, vaginal vs wound excoriation and UA +blood  - 10/4 INR  normalized to 1.6, continue to hold warfarin  - 10/5 pt c/o severe LLE pain and swelling; duplex BLE negative  - 10/5 INR 1.2; started heparin drip given normal INR while pending apheresis line placement (previous HIT workup with negative PF4)--> held 10/7 at 0400 for apheresis line placement  - 10/7 INR 1.2--> started bridge back to coumadin tonight with lovenox. Started after apheresis line placement will start coumadin 5mg and start therapeutic lovenox 120mg BID x5 days AND until INR therepeutic x24 hours (regimen discussed with pharmacy)  - 10/10 INR 1.6   - Home coumadin 5mg daily dose resumed 10/12  - 10/13 INR 1.9   - Follows with Coumadin clinic outpatient  - Plan to consult vascular medicine if INR is not therapeutic on home regimen      # CAN  - Continue home CPAP and home Albuterol PRN     # H/O Cauda Equine syndrome  - Follows Dr. Delacruz as outpatient  - No current issues      DISPO:  - Full code- confirmed on admission  - NOK: Tati Salgado (mother) (#[phone])  - PT/OT rec SNF, plan for rehab at [facility]  - DC to SNF pending pre-cert approval, likely 10/14   - Derm FUV 11/7, Sickle Cell FUV 11/18, Cardiology FUV 2/13

## 2024-10-13 NOTE — CARE PLAN
The patient's goals for the shift include      The clinical goals for the shift include pt will remain injury free    Problem: Pain - Adult  Goal: Verbalizes/displays adequate comfort level or baseline comfort level  10/13/2024 1944 by Kamini Rhodes RN  Outcome: Progressing  10/13/2024 0707 by Kamini Rhodes RN  Outcome: Progressing     Problem: Safety - Adult  Goal: Free from fall injury  10/13/2024 1944 by Kamini Rhodes RN  Outcome: Progressing  10/13/2024 0707 by Kamini Rhodes RN  Outcome: Progressing     Problem: Skin  Goal: Decreased wound size/increased tissue granulation at next dressing change  10/13/2024 1944 by Kamini Rhodes RN  Outcome: Progressing  Flowsheets (Taken 10/13/2024 1944)  Decreased wound size/increased tissue granulation at next dressing change: Promote sleep for wound healing  10/13/2024 0707 by Kamini Rhodes RN  Outcome: Progressing  Flowsheets (Taken 10/13/2024 0707)  Decreased wound size/increased tissue granulation at next dressing change: Promote sleep for wound healing  Goal: Participates in plan/prevention/treatment measures  10/13/2024 1944 by Kamini Rhodes RN  Outcome: Progressing  10/13/2024 0707 by Kamini Rhodes RN  Outcome: Progressing  Flowsheets (Taken 10/13/2024 0707)  Participates in plan/prevention/treatment measures: Elevate heels  Goal: Prevent/manage excess moisture  10/13/2024 1944 by Kamini Rhodes RN  Outcome: Progressing  10/13/2024 0707 by Kamini Rhodes RN  Outcome: Progressing  Flowsheets (Taken 10/13/2024 0707)  Prevent/manage excess moisture: Moisturize dry skin  Goal: Prevent/minimize sheer/friction injuries  10/13/2024 1944 by Kamini Rhodes RN  Outcome: Progressing  10/13/2024 0707 by Kamini Rhodes RN  Outcome: Progressing  Flowsheets (Taken 10/13/2024 0707)  Prevent/minimize sheer/friction injuries: Increase activity/out of bed for meals  Goal: Promote/optimize nutrition  10/13/2024 1944 by Kamini Rhodes RN  Outcome: Progressing  10/13/2024 0707 by  Kamini Rhodes RN  Outcome: Progressing  Flowsheets (Taken 10/13/2024 0707)  Promote/optimize nutrition: Offer water/supplements/favorite foods  Goal: Promote skin healing  10/13/2024 1944 by Kamini Rhodes RN  Outcome: Progressing  10/13/2024 0707 by Kamini Rhodes RN  Outcome: Progressing  Flowsheets (Taken 10/13/2024 0707)  Promote skin healing: Assess skin/pad under line(s)/device(s)     Problem: Knowledge Deficit  Goal: Patient/family/caregiver demonstrates understanding of disease process, treatment plan, medications, and discharge instructions  10/13/2024 1944 by Kamini Rhodes RN  Outcome: Progressing  10/13/2024 0707 by Kamini Rhodes RN  Outcome: Progressing     Problem: Mechanical Ventilation  Goal: Patient Will Maintain Patent Airway  10/13/2024 1944 by Kamini Rhodes RN  Outcome: Progressing  10/13/2024 0707 by Kamini Rhodes RN  Outcome: Progressing  Goal: Oral health is maintained or improved  10/13/2024 1944 by Kamini Rhodes RN  Outcome: Progressing  10/13/2024 0707 by Kamini Rhodes RN  Outcome: Progressing  Goal: Tracheostomy will be managed safely  10/13/2024 1944 by Kamini Rhodes RN  Outcome: Progressing  10/13/2024 0707 by Kamini Rhodes RN  Outcome: Progressing  Goal: ET tube will be managed safely  10/13/2024 1944 by Kamini Rhodes RN  Outcome: Progressing  10/13/2024 0707 by Kamini Rhodes RN  Outcome: Progressing  Goal: Ability to express needs and understand communication  10/13/2024 1944 by Kamini Rhodes RN  Outcome: Progressing  10/13/2024 0707 by Kamini Rhodes RN  Outcome: Progressing  Goal: Mobility/activity is maintained at optimum level for patient  10/13/2024 1944 by Kamini Rhodes RN  Outcome: Progressing  10/13/2024 0707 by Kamini Rhodes RN  Outcome: Progressing     Problem: Pain  Goal: Takes deep breaths with improved pain control throughout the shift  10/13/2024 1944 by Kamini Rhodes RN  Outcome: Progressing  10/13/2024 0707 by Kamini Rhodes RN  Outcome: Progressing  Goal:  Turns in bed with improved pain control throughout the shift  10/13/2024 1944 by Kamini Rhodes RN  Outcome: Progressing  10/13/2024 0707 by Kamini Rhodes RN  Outcome: Progressing  Goal: Walks with improved pain control throughout the shift  10/13/2024 1944 by Kamini Rhodes RN  Outcome: Progressing  10/13/2024 0707 by Kamini Rhodes RN  Outcome: Progressing  Goal: Performs ADL's with improved pain control throughout shift  10/13/2024 1944 by Kamini Rhodes RN  Outcome: Progressing  10/13/2024 0707 by Kamini Rhodes RN  Outcome: Progressing  Goal: Participates in PT with improved pain control throughout the shift  10/13/2024 1944 by Kamini Rhodes RN  Outcome: Progressing  10/13/2024 0707 by Kamini Rhodes RN  Outcome: Progressing  Goal: Free from opioid side effects throughout the shift  10/13/2024 1944 by Kamini Rhodes RN  Outcome: Progressing  10/13/2024 0707 by Kamini Rhodes RN  Outcome: Progressing  Goal: Free from acute confusion related to pain meds throughout the shift  10/13/2024 1944 by Kamini Rhodes RN  Outcome: Progressing  10/13/2024 0707 by Kamini Rhodes RN  Outcome: Progressing     Problem: Nutrition  Goal: Less than 5 days NPO/clear liquids  10/13/2024 1944 by Kamini Rhodes RN  Outcome: Progressing  10/13/2024 0707 by Kamini Rhodes RN  Outcome: Progressing  Goal: Oral intake greater than 50%  10/13/2024 1944 by Kamini Rhodes RN  Outcome: Progressing  10/13/2024 0707 by Kamini Rhodes RN  Outcome: Progressing  Goal: Oral intake greater 75%  10/13/2024 1944 by Kamini Rhodes RN  Outcome: Progressing  10/13/2024 0707 by Kamini Rhodes RN  Outcome: Progressing  Goal: Consume prescribed supplement  10/13/2024 1944 by Kamini Rhodes RN  Outcome: Progressing  10/13/2024 0707 by Kamini Rhodes RN  Outcome: Progressing  Goal: Adequate PO fluid intake  10/13/2024 1944 by Kamini Rhodes RN  Outcome: Progressing  10/13/2024 0707 by Kamini Rhodes RN  Outcome: Progressing  Goal: Nutrition support goals  are met within 48 hrs  10/13/2024 1944 by Kamini Rhodes RN  Outcome: Progressing  10/13/2024 0707 by Kamini Rhodes RN  Outcome: Progressing  Goal: Nutrition support is meeting 75% of nutrient needs  10/13/2024 1944 by Kamini Rhodes RN  Outcome: Progressing  10/13/2024 0707 by Kamini Rhodes RN  Outcome: Progressing  Goal: Tube feed tolerance  10/13/2024 1944 by Kamini Rhodes RN  Outcome: Progressing  10/13/2024 0707 by Kamini Rhodes RN  Outcome: Progressing  Goal: BG  mg/dL  10/13/2024 1944 by Kamini Rhodes RN  Outcome: Progressing  10/13/2024 0707 by Kamini Rhodes RN  Outcome: Progressing  Goal: Lab values WNL  10/13/2024 1944 by Kamini Rhodes RN  Outcome: Progressing  10/13/2024 0707 by Kamini Rhodes RN  Outcome: Progressing  Goal: Electrolytes WNL  10/13/2024 1944 by Kamini Rhodes RN  Outcome: Progressing  10/13/2024 0707 by Kamini Rhodes RN  Outcome: Progressing  Goal: Promote healing  10/13/2024 1944 by Kamini Rhodes RN  Outcome: Progressing  10/13/2024 0707 by Kamini Rhodes RN  Outcome: Progressing  Goal: Maintain stable weight  10/13/2024 1944 by Kamini Rhodes RN  Outcome: Progressing  10/13/2024 0707 by Kamini Rhodes RN  Outcome: Progressing  Goal: Reduce weight from edema/fluid  10/13/2024 1944 by Kamini Rhodes RN  Outcome: Progressing  10/13/2024 0707 by Kamini Rhodes RN  Outcome: Progressing  Goal: Gradual weight gain  10/13/2024 1944 by Kamini Rhodes RN  Outcome: Progressing  10/13/2024 0707 by Kamini Rhodes RN  Outcome: Progressing  Goal: Improve ostomy output  10/13/2024 1944 by Kamini Rhodes RN  Outcome: Progressing  10/13/2024 0707 by Kamini Rhodes RN  Outcome: Progressing     Problem: Fall/Injury  Goal: Not fall by end of shift  10/13/2024 1944 by Kamini Rhodes RN  Outcome: Progressing  10/13/2024 0707 by Kamini Rhodes RN  Outcome: Progressing  Goal: Be free from injury by end of the shift  10/13/2024 1944 by Kamini Rhodes RN  Outcome: Progressing  10/13/2024 0707 by  Kamini Rhodes RN  Outcome: Progressing  Goal: Verbalize understanding of personal risk factors for fall in the hospital  10/13/2024 1944 by Kamini Rhodes RN  Outcome: Progressing  10/13/2024 0707 by Kamini Rhodes RN  Outcome: Progressing  Goal: Verbalize understanding of risk factor reduction measures to prevent injury from fall in the home  10/13/2024 1944 by Kamini Rhodes RN  Outcome: Progressing  10/13/2024 0707 by Kamini Rhodes RN  Outcome: Progressing  Goal: Use assistive devices by end of the shift  10/13/2024 1944 by Kamini Rhodes RN  Outcome: Progressing  10/13/2024 0707 by Kamini Rhodes RN  Outcome: Progressing  Goal: Pace activities to prevent fatigue by end of the shift  10/13/2024 1944 by Kamini Rhodes RN  Outcome: Progressing  10/13/2024 0707 by Kamini Rhodes RN  Outcome: Progressing

## 2024-10-13 NOTE — CARE PLAN
The patient's goals for the shift include      The clinical goals for the shift include pt will remain injury free    Problem: Pain - Adult  Goal: Verbalizes/displays adequate comfort level or baseline comfort level  Outcome: Progressing     Problem: Safety - Adult  Goal: Free from fall injury  Outcome: Progressing     Problem: Skin  Goal: Decreased wound size/increased tissue granulation at next dressing change  Outcome: Progressing  Flowsheets (Taken 10/12/2024 2235)  Decreased wound size/increased tissue granulation at next dressing change: Promote sleep for wound healing  Goal: Participates in plan/prevention/treatment measures  Outcome: Progressing  Goal: Prevent/manage excess moisture  Outcome: Progressing  Goal: Prevent/minimize sheer/friction injuries  Outcome: Progressing  Goal: Promote/optimize nutrition  Outcome: Progressing  Goal: Promote skin healing  Outcome: Progressing     Problem: Knowledge Deficit  Goal: Patient/family/caregiver demonstrates understanding of disease process, treatment plan, medications, and discharge instructions  Outcome: Progressing     Problem: Mechanical Ventilation  Goal: Patient Will Maintain Patent Airway  Outcome: Progressing  Goal: Oral health is maintained or improved  Outcome: Progressing  Goal: Tracheostomy will be managed safely  Outcome: Progressing  Goal: ET tube will be managed safely  Outcome: Progressing  Goal: Ability to express needs and understand communication  Outcome: Progressing  Goal: Mobility/activity is maintained at optimum level for patient  Outcome: Progressing     Problem: Pain  Goal: Takes deep breaths with improved pain control throughout the shift  Outcome: Progressing  Goal: Turns in bed with improved pain control throughout the shift  Outcome: Progressing  Goal: Walks with improved pain control throughout the shift  Outcome: Progressing  Goal: Performs ADL's with improved pain control throughout shift  Outcome: Progressing  Goal: Participates in  PT with improved pain control throughout the shift  Outcome: Progressing  Goal: Free from opioid side effects throughout the shift  Outcome: Progressing  Goal: Free from acute confusion related to pain meds throughout the shift  Outcome: Progressing     Problem: Nutrition  Goal: Less than 5 days NPO/clear liquids  Outcome: Progressing  Goal: Oral intake greater than 50%  Outcome: Progressing  Goal: Oral intake greater 75%  Outcome: Progressing  Goal: Consume prescribed supplement  Outcome: Progressing  Goal: Adequate PO fluid intake  Outcome: Progressing  Goal: Nutrition support goals are met within 48 hrs  Outcome: Progressing  Goal: Nutrition support is meeting 75% of nutrient needs  Outcome: Progressing  Goal: Tube feed tolerance  Outcome: Progressing  Goal: BG  mg/dL  Outcome: Progressing  Goal: Lab values WNL  Outcome: Progressing  Goal: Electrolytes WNL  Outcome: Progressing  Goal: Promote healing  Outcome: Progressing  Goal: Maintain stable weight  Outcome: Progressing  Goal: Reduce weight from edema/fluid  Outcome: Progressing  Goal: Gradual weight gain  Outcome: Progressing  Goal: Improve ostomy output  Outcome: Progressing     Problem: Fall/Injury  Goal: Not fall by end of shift  Outcome: Progressing  Goal: Be free from injury by end of the shift  Outcome: Progressing  Goal: Verbalize understanding of personal risk factors for fall in the hospital  Outcome: Progressing  Goal: Verbalize understanding of risk factor reduction measures to prevent injury from fall in the home  Outcome: Progressing  Goal: Use assistive devices by end of the shift  Outcome: Progressing  Goal: Pace activities to prevent fatigue by end of the shift  Outcome: Progressing

## 2024-10-13 NOTE — CARE PLAN
The patient's goals for the shift include      Problem: Pain - Adult  Goal: Verbalizes/displays adequate comfort level or baseline comfort level  10/13/2024 0707 by Kamini Rhodes RN  Outcome: Progressing  10/12/2024 2235 by Kamini Rhodes RN  Outcome: Progressing     Problem: Safety - Adult  Goal: Free from fall injury  10/13/2024 0707 by Kamini Rhodes RN  Outcome: Progressing  10/12/2024 2235 by Kamini Rhodes RN  Outcome: Progressing     Problem: Skin  Goal: Decreased wound size/increased tissue granulation at next dressing change  10/13/2024 0707 by Kamini Rhodes RN  Outcome: Progressing  Flowsheets (Taken 10/13/2024 0707)  Decreased wound size/increased tissue granulation at next dressing change: Promote sleep for wound healing  10/12/2024 2235 by Kamini Rhodes RN  Outcome: Progressing  Flowsheets (Taken 10/12/2024 2235)  Decreased wound size/increased tissue granulation at next dressing change: Promote sleep for wound healing  Goal: Participates in plan/prevention/treatment measures  10/13/2024 0707 by Kamini Rhodes RN  Outcome: Progressing  Flowsheets (Taken 10/13/2024 0707)  Participates in plan/prevention/treatment measures: Elevate heels  10/12/2024 2235 by Kamini Rhodes RN  Outcome: Progressing  Goal: Prevent/manage excess moisture  10/13/2024 0707 by Kamini Rhodes RN  Outcome: Progressing  Flowsheets (Taken 10/13/2024 0707)  Prevent/manage excess moisture: Moisturize dry skin  10/12/2024 2235 by Kamini Rhodes RN  Outcome: Progressing  Goal: Prevent/minimize sheer/friction injuries  10/13/2024 0707 by Kamini Rhodes RN  Outcome: Progressing  Flowsheets (Taken 10/13/2024 0707)  Prevent/minimize sheer/friction injuries: Increase activity/out of bed for meals  10/12/2024 2235 by Kamini Rhodes RN  Outcome: Progressing  Goal: Promote/optimize nutrition  10/13/2024 0707 by Kamini Rhodes RN  Outcome: Progressing  Flowsheets (Taken 10/13/2024 0707)  Promote/optimize nutrition: Offer  water/supplements/favorite foods  10/12/2024 2235 by Kamini Rhodes RN  Outcome: Progressing  Goal: Promote skin healing  10/13/2024 0707 by Kamini Rhodes RN  Outcome: Progressing  Flowsheets (Taken 10/13/2024 0707)  Promote skin healing: Assess skin/pad under line(s)/device(s)  10/12/2024 2235 by Kamini Rhodes RN  Outcome: Progressing     Problem: Knowledge Deficit  Goal: Patient/family/caregiver demonstrates understanding of disease process, treatment plan, medications, and discharge instructions  10/13/2024 0707 by Kamini Rhodes RN  Outcome: Progressing  10/12/2024 2235 by Kamini Rhodes RN  Outcome: Progressing     Problem: Mechanical Ventilation  Goal: Patient Will Maintain Patent Airway  10/13/2024 0707 by Kamini Rhodes RN  Outcome: Progressing  10/12/2024 2235 by Kamini Rhodes RN  Outcome: Progressing  Goal: Oral health is maintained or improved  10/13/2024 0707 by Kamini Rhodes RN  Outcome: Progressing  10/12/2024 2235 by Kamini Rhodes RN  Outcome: Progressing  Goal: Tracheostomy will be managed safely  10/13/2024 0707 by Kamini Rhodes RN  Outcome: Progressing  10/12/2024 2235 by Kamini Rhodes RN  Outcome: Progressing  Goal: ET tube will be managed safely  10/13/2024 0707 by Kamini Rhodes RN  Outcome: Progressing  10/12/2024 2235 by Kamini Rhodes RN  Outcome: Progressing  Goal: Ability to express needs and understand communication  10/13/2024 0707 by Kamini Rhodes RN  Outcome: Progressing  10/12/2024 2235 by Kamini Rhodes RN  Outcome: Progressing  Goal: Mobility/activity is maintained at optimum level for patient  10/13/2024 0707 by Kamini Rhodes RN  Outcome: Progressing  10/12/2024 2235 by Kamini Rhodes RN  Outcome: Progressing     Problem: Pain  Goal: Takes deep breaths with improved pain control throughout the shift  10/13/2024 0707 by Kamini Rhodes RN  Outcome: Progressing  10/12/2024 2235 by Kamini Rhodes RN  Outcome: Progressing  Goal: Turns in bed with improved pain control throughout  the shift  10/13/2024 0707 by Kamini Rhodes RN  Outcome: Progressing  10/12/2024 2235 by Kamini Rhodes RN  Outcome: Progressing  Goal: Walks with improved pain control throughout the shift  10/13/2024 0707 by Kamini Rhodes RN  Outcome: Progressing  10/12/2024 2235 by Kamini Rhodes RN  Outcome: Progressing  Goal: Performs ADL's with improved pain control throughout shift  10/13/2024 0707 by Kamini Rhodes RN  Outcome: Progressing  10/12/2024 2235 by Kamini Rhodes RN  Outcome: Progressing  Goal: Participates in PT with improved pain control throughout the shift  10/13/2024 0707 by Kamini Rhodes RN  Outcome: Progressing  10/12/2024 2235 by Kamini Rhodes RN  Outcome: Progressing  Goal: Free from opioid side effects throughout the shift  10/13/2024 0707 by Kamini Rhodes RN  Outcome: Progressing  10/12/2024 2235 by Kamini Rhodes RN  Outcome: Progressing  Goal: Free from acute confusion related to pain meds throughout the shift  10/13/2024 0707 by Kamini Rhodes RN  Outcome: Progressing  10/12/2024 2235 by Kamini Rhodes RN  Outcome: Progressing     Problem: Nutrition  Goal: Less than 5 days NPO/clear liquids  10/13/2024 0707 by Kamini Rhodes RN  Outcome: Progressing  10/12/2024 2235 by Kamini Rhodes RN  Outcome: Progressing  Goal: Oral intake greater than 50%  10/13/2024 0707 by Kamini Rhodes RN  Outcome: Progressing  10/12/2024 2235 by Kamini Rhodes RN  Outcome: Progressing  Goal: Oral intake greater 75%  10/13/2024 0707 by Kamini Rhodes RN  Outcome: Progressing  10/12/2024 2235 by Kamini Rhodes RN  Outcome: Progressing  Goal: Consume prescribed supplement  10/13/2024 0707 by Kamini Rhodes RN  Outcome: Progressing  10/12/2024 2235 by Kamini Rhodes RN  Outcome: Progressing  Goal: Adequate PO fluid intake  10/13/2024 0707 by Kamini Rhodes RN  Outcome: Progressing  10/12/2024 2235 by Kamini Rhodes RN  Outcome: Progressing  Goal: Nutrition support goals are met within 48 hrs  10/13/2024 0707 by Kamini  JERICA Rhodes  Outcome: Progressing  10/12/2024 2235 by Kamini Rhodes RN  Outcome: Progressing  Goal: Nutrition support is meeting 75% of nutrient needs  10/13/2024 0707 by Kamini Rhodes RN  Outcome: Progressing  10/12/2024 2235 by Kamini Rhodes RN  Outcome: Progressing  Goal: Tube feed tolerance  10/13/2024 0707 by Kamini Rhodes RN  Outcome: Progressing  10/12/2024 2235 by Kamini Rhodes RN  Outcome: Progressing  Goal: BG  mg/dL  10/13/2024 0707 by Kamini Rhodes RN  Outcome: Progressing  10/12/2024 2235 by Kamini Rhodes RN  Outcome: Progressing  Goal: Lab values WNL  10/13/2024 0707 by Kamini Rhodes RN  Outcome: Progressing  10/12/2024 2235 by Kamini Rhodes RN  Outcome: Progressing  Goal: Electrolytes WNL  10/13/2024 0707 by Kamini Rhodes RN  Outcome: Progressing  10/12/2024 2235 by Kamini Rhodes RN  Outcome: Progressing  Goal: Promote healing  10/13/2024 0707 by Kamini Rhodes RN  Outcome: Progressing  10/12/2024 2235 by Kamini Rhodes RN  Outcome: Progressing  Goal: Maintain stable weight  10/13/2024 0707 by Kamini Rhodes RN  Outcome: Progressing  10/12/2024 2235 by Kamini Rhodes RN  Outcome: Progressing  Goal: Reduce weight from edema/fluid  10/13/2024 0707 by Kamini Rhodes RN  Outcome: Progressing  10/12/2024 2235 by Kamini Rhodes RN  Outcome: Progressing  Goal: Gradual weight gain  10/13/2024 0707 by Kamini Rhodes RN  Outcome: Progressing  10/12/2024 2235 by Kamini Rhodes RN  Outcome: Progressing  Goal: Improve ostomy output  10/13/2024 0707 by Kamini Rhodes RN  Outcome: Progressing  10/12/2024 2235 by Kamini Rhodes RN  Outcome: Progressing     Problem: Fall/Injury  Goal: Not fall by end of shift  10/13/2024 0707 by Kamini Rhodes RN  Outcome: Progressing  10/12/2024 2235 by Kamini Rhodes RN  Outcome: Progressing  Goal: Be free from injury by end of the shift  10/13/2024 0707 by Kamini Rhodes RN  Outcome: Progressing  10/12/2024 2235 by Kamini Rhodes RN  Outcome: Progressing  Goal:  Verbalize understanding of personal risk factors for fall in the hospital  10/13/2024 0707 by Kamini Rhodes RN  Outcome: Progressing  10/12/2024 2235 by Kamini Rhodes RN  Outcome: Progressing  Goal: Verbalize understanding of risk factor reduction measures to prevent injury from fall in the home  10/13/2024 0707 by Kamini Rhodes RN  Outcome: Progressing  10/12/2024 2235 by Kamini Rhodes RN  Outcome: Progressing  Goal: Use assistive devices by end of the shift  10/13/2024 0707 by Kamini Rhodes RN  Outcome: Progressing  10/12/2024 2235 by Kamini Rhodes RN  Outcome: Progressing  Goal: Pace activities to prevent fatigue by end of the shift  10/13/2024 0707 by Kamini Rhodes RN  Outcome: Progressing  10/12/2024 2235 by Kamini Rhodes RN  Outcome: Progressing     The clinical goals for the shift include pt will remain injury free

## 2024-10-13 NOTE — PROGRESS NOTES
10/13/24 9702 Transitional Care Coordinator Notes:    Updated progress notes and OT note sent to [facility]. Awaiting to send PT note once uploaded. Hoping to get precert started today.                    Assessment/Plan   Assessment & Plan  Sickle cell disease with crisis and other complication               Padmini Howell RN

## 2024-10-13 NOTE — PROGRESS NOTES
"Senait Narvaez is a 54 y.o. female on day 33 of admission presenting with Sickle cell disease with crisis and other complication.    Subjective   Patient seen resting in bed. Reports continued pain in b/l LE but otherwise feels well this morning without other acute complaints. We discussed plan for walking pulse ox today to see if she qualifies for continuous oxygen at home. We discussed likely DC to SNF on Monday 10/14 pending pre-cert approval. Patient agreeable to plan. Otherwise denies shortness of breath, chest pain, abdominal pain, N/V/D, F/C, H/A.        Objective     Physical Exam  Constitutional:       Appearance: Normal appearance.   HENT:      Mouth/Throat:      Mouth: Mucous membranes are moist.   Eyes:      Pupils: Pupils are equal, round, and reactive to light.   Cardiovascular:      Rate and Rhythm: Normal rate and regular rhythm.      Pulses: Normal pulses.      Heart sounds: Normal heart sounds.   Pulmonary:      Effort: Pulmonary effort is normal.      Breath sounds: Normal breath sounds.   Abdominal:      General: Abdomen is flat. Bowel sounds are normal.      Palpations: Abdomen is soft.   Musculoskeletal:         General: Normal range of motion.   Skin:     General: Skin is warm.   Neurological:      General: No focal deficit present.      Mental Status: She is alert.   Psychiatric:         Mood and Affect: Mood normal.         Last Recorded Vitals  Blood pressure 113/65, pulse 74, temperature 36.3 °C (97.3 °F), temperature source Temporal, resp. rate 18, height 1.651 m (5' 5\"), weight 113 kg (248 lb 7.3 oz), SpO2 98%.  Intake/Output last 3 Shifts:  I/O last 3 completed shifts:  In: 804.2 (7.1 mL/kg) [P.O.:450; I.V.:100 (0.9 mL/kg); IV Piggyback:254.2]  Out: 3903 (34.6 mL/kg) [Urine:3903 (1 mL/kg/hr)]  Weight: 112.7 kg     Relevant Results                   Scheduled medications  folic acid, 1 mg, oral, Daily  furosemide, 20 mg, oral, Every other day  [Held by provider] metoprolol tartrate, 12.5 " mg, oral, BID  nystatin, 1 Application, Topical, BID  oxygen, , inhalation, Continuous - Inhalation  pantoprazole, 40 mg, oral, Daily before breakfast  polyethylene glycol, 17 g, oral, BID  sennosides, 2 tablet, oral, BID  thiamine, 100 mg, oral, Daily  triamcinolone, , Topical, BID  warfarin, 5 mg, oral, Daily  white petrolatum, , Topical, BID      Continuous medications     PRN medications  PRN medications: albuterol, alteplase, bisacodyl, dextrose, dextrose, diphenhydrAMINE, glucagon, glucagon, guaiFENesin, naloxone, ondansetron, oxyCODONE, oxygen, sodium chloride             Assessment/Plan   Assessment & Plan  Sickle cell disease with crisis and other complication  Senait Narvaez is a 54 year-old female with a PMHx of Hgb SC disease (previously on exchange transfusions q4-6 weeks, last transfusion 3/1/24), hx of SVC syndrome, 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 Patient was subsequently intubated due to worsening lethargic and lack of respiratory compensation from significant metabolic acidosis with concern for ACS. Vancomycin and Zosyn started for empiric coverage, patient also started on pressors for BP support. Patient was transfused 2 units pRBCs and then underwent RBCEx on 9/11. Cardiology consulted for c/f NSTEMI vs demand ischemia (ST depressions and elevated troponin), rec treat as ACS. Course further c/b severe CHARLES with peak sCr of 5.4, and acute liver injury with severely elevated liver enzymes (ALT 4119, AST >10k). Liver US only remarkable for fatty infiltration, viral hepatitis panel negative. CHARLES and liver injury improved with supportive management. Pruritic rash noted 9/14, Derm consulted and took multiple biopsies 9/15. Rash then began to worsen to involve the  groin, lateral thigh, and scalp with numerous tense bullae. Discontinued heparin given concerns for potential HIT, started bivalirudin. However PF4 was negative. Patient was extubated on 9/16/2024. Started stress dose steroids given continued pressor requirements, pressors Dc'd 9/17.     Patient transferred to [facility] 9/17, PT/OT rec SNF. Coumadin bridge was started 9/18. Given persistent rash with unremarkable labs, derm re-biopsied on 9/18, findings ultimately felt to be most c/w fixed drug eruption. Dermatology contacted for bx results (9/20) rec triamcinolone cream BID and interdry cloths. Leukocytosis uptrending 9/21, repeat infectious workup negative (CXR, Abd XR, blood cultures, MRSA negative 9/21). UA with leuks. On 9/25, per nursing and patient, rash seems to be worsening. Derm re-engaged recommended to apply vasoline to all eroded/denuded areas and continue triamcinolone cream to both the black plaques as well as the red areas across the trunk and thighs. 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 Wound care following. For warfarin dosing, per pharmacy, if INR between 2-3 can restart home warfarin 5mg daily, if INR < 2 restart bival. INR 3.4 on 9/25, warfarin stopped, repeat INR 2.8 9/26 and warfarin restarted. On 9/26, increased PO oxycodone and discontinued IV dilaudid in anticipation for discharge. Overnight on 9/29, brain attack called out of concern for change in mental status, no focal deficits noted, BAT cancelled and narcan administered x3 with improvement of mental status. Mentation improved as of 10/2, small dose oxy resumed. INR supratherapeutic 10/3 at 6.7, 5mg IV Vit K x1 given (ok per pharmacy) I/s/o ?bleeding from vagina vs wound excoriation and UA with +blood. INR  down to 1.5 (10/4). Plan for RBCEx prior to dc per Dr. Whaley, planned 10/8. 10/5 ordered duplex BLE as pt c/o severe LLE pain and  swelling which was negative. Left ankle x-ray (10/8) negative for acute fractures, showed soft tissue edema. 10/5 started heparin drip (per attending) given normal INR while pending apheresis line placement (previous HIT workup with negative PF4). 10/7 started bridge back to coumadin with lovenox after apheresis line. Continue therapeutic Lovenox 120mg BID with Coumadin 5mg nightly x5 days and until INR is in therapeutic range x24 hours. Home Coumadin DC to SNF likely 10/14 pending precert approval.      # Waxing and waning AMS-- resolved   - Felt to be due to opioids i/s/o worsening renal function and c/b respiratory acidosis  - All opioids on hold (9/28-10/2)  - 10/2 resumed oxycodone 10mg q3 hours as AMS now resolved   - continue to hold extended release oxycontin   - Keep supplemental O2 on board, but titrate to maintain SPO2 of 90-92% at most  - On CPAP at home, enforce nightly use to extent possible; may need BiPAP if unresolved     # CHARLES on CKD II   - Baseline ~ SCr <2, peak 5.4 this admission. 9/21 sCr 1.58 --> 1.13 (9/25)--> 1.14 (10/3)--> 0.96 (10/6)--> 0.99 (10/7) --> 0.97 (10/8) --> 1.30 (10/10)  --> 1.08 (10/13)   - Likely pre-renal vs ATN given persistant hypotension over admission, on CKD II, now polyuric phase  - FeNA 0.4%  - Severe hyperkalemia with previous peaked T waves on EKG, resolved  - Nephrology following, now signed off   - s/p multiple K cocktails in MICU  - Encourage increased PO intake       # Purpuric rash, suspected fixed drug eruption  # Concern for possible heparin-induced thrombocytopenia (HIT) -resolved  - Purple, reticular pattern of plaques noted around the bilateral breasts, scalp and groin,  with tense bullae. Non-erythematous, no purulence  - Some initial concern for possible heparin-induced thrombocytopenia with concurrent skin findings. Discontinued heparin 9/16 and transitioned to bivalirudin  - 4 T score 0, PF4 w/ reflex BINA negative. Hematology ultimately consulted, felt  there was low concern for HIT and signed off  - Differential diagnosis includes purpura secondary to infection versus coagulopathy versus vasculopathy versus small and/or medium vessel vasculitis versus calciphylaxis vs drug rash  - Dermatology consulted, work-up to date:  - Low Protein C activity  - ANCA, PR3 and MPO negative  - ISABELLE neg  - cryoglobulin labs- not enough specimen to analyze  - S/p skin punch biopsies x 2 9/15 and one on 9/18, showed SUPERFICIAL EPIDERMAL DEGENERATION WITH ERYTHROCYTE EXTRAVASATION WITH NEUTROPHILS. These findings could be seen secondary to an older trauma, or resolving erythema multiforme, a fixed drug eruption, Edgar-Christopher syndrome, or toxic epidermal necrolysis. Favor Multifocal Fixed Drug Eruption    - Dermatology contacted (9/20) about results, recommended adding vancomycin to allergy list and starting triamcinolone  - Continue triamcinolone 0.1% cream BID (9/20-current) per derm recs  - On 9/25, concern from pt and nursing that rash is not improving. Under breasts and groin still causing pain. Bilateral upper thighs and forehead rash improving   - Wound care re-consulted 9/25, s/o 10/3 as derm gave recs below (wound pictures documented in WC note from 10/3)  - Re-engaged dermatology on 9/25 given worsening rash; recommended to apply vasoline to all eroded/denuded areas and continue triamcinolone cream to both the black plaques as well as the red areas across the trunk and thighs. Per dermatology, erythema is mild and not palpable, it is most likely part of the drug eruption the patient has previously been known to have     # Leukocytosis-- resolved   - Likely I/s/o rash vs reactive vs infectious process  - Leukocytosis noted on admission to MICU (WBC 24.8) --> 12.7 (9/25) --> 8.3 (10/3) --> 7.2 (10/13)   - Leukocytosis improved throughout hospital stay however started uptrending 9/20; now improving on 9/25   - Blood cultures 9/10, 9/12, 9/15 all NGTD  - Strep and Legionella  Ag, MRSA nares negative  - CXR (9/21) largely unremarkable, again re-demonstrated perihilar prominence seen on prior XR  - Abdominal xray (9/21) unremarkable  - Repeat blood cultures x 2 (9/21) NGTD  - UA (9/21) with 500 LE  - UA (9/30) +bacteria, +leuks, urine culture (9/29) mult organisms  - UA sent 10/3 with +blood, leuks, and WBC- not reflex to cx so UCX added on to 10/3 UA and again showed multiple organisms  - Will hold off tx for UTI for now as pt asymptomatic and leukocytosis improved. 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  # ACS  - Previously managed through routine RBC exchanges q6 weeks, most recent RBCEx 3/1/24, per patient pausing on transfusions for time being  - OARRS reviewed, no aberrant behavior noted  - LDH>12,000 (now downtrending), haptoglobin <30, fibrinogen 318, retic % 5.2 on presentation, bili 10.7  - Leukocytosis 24.8 on admission to MICU  - CXR (9/10) persistent atelectasis/scarring in the left lower lung  - CT CAP (9/11) Diffuse mosaic attenuation of the lung parenchyma, which can be seen in the setting of small airway disease, pulmonary edema or acute infection  - Intubated upon arrival to MICU, now s/p extubation 9/16  - s/p Vanc and Zosyn (finished both courses prior to floor transfer)  - Procal elevated 7.09 (9/11)  - Blood cultures 9/10, 9/12, 9/15 all NGTD  - Strep and Legionella Ag, MRSA nares negative  - Trop peak 1431, cards rec treat as ACS (see below)  - [facility]  - S/p exchange transfusion 9/11 AM for ACS  - S/p routine exchange transfusion 10/8 per Dr. Whaley   - Care plan from 12/6/23- For mild to moderate pain: Dilaudid 0.5mg IVP q3h as needed, for moderate to severe pain Dilaudid 1- 1.5mg q3h PRN  - On arrival to [facility], started on 0.6mg dilaudid IVP q3h PRN severe pain   - s/p 0.6mg dilaudid IVP q4h PRN breakthrough and 15mg PO oxycodone Q4 hrs severe pain (9/25)  - Per discussion with Dr. Whaley (9/25), okay to increase  PO oxycodone to 15-20mg Q4hr to optimize pain for discharge to SNF.   - On 9/26, discontinued IV dilaudid and increased to 20mg PO oxycodone q4hrs for severe pain   - Opioids on hold as of 9/28 d/t AMS- resumed 2.5mg oxy q4hrs PRN severe pain on 10/2. ER oxy 10mg BID still held  - (10/5- current) increased pain reg as AMS resolved--> continue oxycodone 10mg q3hrs PRN severe pain  - Continue to hold ER Oxycontin indefinitely as was major contributor to AMS (was started in ICU and was not on this prior to admission)  - Bowel regimen for opioid induced constipation with Senna 2tabs BID, Miralax BID, and dulcolax suppository 10mg PRN constipation  - Zofran for opioid induced nausea, no benadryl ordered (assumed from previous AMS but also no reports of opioid-induced pruritus)  - Continue home Folic Acid 1mg daily, continue thiamine 100mg daily     # ST depression with tropinemia, suspected 2/2 demand ischemia iso ACS  # Hx SVT  - Elevated troponins on admission, peak 1431 and now downtrending   - EKG with ST depressions, likely Type II MI due to vaso-occlusive crisis and anemia  - TTE 9/11: EF 60-65%, RV enlarged and reduced in function which appears stable when compared to last TTE (1/2024)  - Troponin leak is likely due to cardiac demand vs supply mismatch in the setting of worsening anemia and vaso-occlusive crisis  - Cardiology consulted, now signed off with indication to treat troponemia as ACS  - Lasix held in MICU 2/2 hypotension and CHARLES, can resume as able   - Metop 12.5mg BID held in MICU 2/2 hypotension- BP and HR continue to remain stable, holding off on restarting for now  - s/p 1x dose of po lasix 20mg on 10/9 and s/p IVP 20mg lasix on 10/10 for volume overload, plan to resume home dose lasix once LE edema back to patient's baseline   - Follow-up with Cardiology outpt, FUV 2/13/25     # Acute liver injury, improving  # Direct hyperbilirubinuria, improving  - Suspected 2/2 hemodynamic instability versus  sickle cell crisis  - [facility], T bili 10.2, INR 3.0; continues to improve  - CT with hepatic venous congestion, patient lacks gallbladder  - Liver US with Doppler: Diffuse fatty infiltration, unremarkable doppler interrogation of the liver  - EBV IgG positive, IgM negative  - CMV IgG positive, PCR negative  - Acute hepatitis panel unremarkable  - Hepatology initially following, now signed off  - Will continue to monitor, trend daily CMP     # pHTN   - Initial c/f CTEPH as imaging findings with dilated PA, filling defects, and mosaicism  - VQ scan (6/13/23) with non anatomical basal defects and RUL defect due to scar--> not favoring CTEPH per Dr. Yi and Dr. 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 On lifelong anticoagulation, DOAC discouraged due to high risk of clotting   - Home Coumadin 5mg daily initially HELD on admission to MICU 2/2 unstable course  - Restarted coumadin 9/18 PM, will plan to bridge with bival, Ok'd by heme   - 9/20 INR 3.8, bival and home warfarin held. Pharmacy rec repeat INR 4 hours after bival was held. If the INR < 2, we can restart the bival, but if the INR 2-3, we can continue with warfarin monotherapy   - 9/20 repeat INR 3.7, pharmacy rec continuing to hold both medications  - 9/21 INR 3.2, per pharmacy - get repeat INR in AM 9/22, if INR between 2-3 can  restart home warfarin 5mg daily  -INR 2.6 (9/24) after restarting Warfarin 5mg, will maintain INR goal of 2-3  - INR 3.4 (9/25). Held warfarin on 9/25. Repeat INR 9/26. Plan to restart if <3   - INR 2.8 (9/26), restarted warfarin, recheck INR 9/27   - INR 4.0 (9/30), held warfarin, repeat INR 10/1 ordered, plan to restart if <3  - INR 6.8 (10/1), patient without any active bleeding, per attending and pharmacy, warfarin held   - INR 6.8 (10/2), patient without any active bleeding, per attending and pharmacy, warfarin held   - INR 6.7 (10/3), cont to hold warfarin. Ordered 5mg IV Vit K x1 (ok per pharmacy) d/t ?bleeding, vaginal vs wound excoriation and UA +blood  - 10/4 INR normalized to 1.6, continue to hold warfarin  - 10/5 pt c/o severe LLE pain and swelling; duplex BLE negative  - 10/5 INR 1.2; started heparin drip given normal INR while pending apheresis line placement (previous HIT workup with negative PF4)--> held 10/7 at 0400 for apheresis line placement  - 10/7 INR 1.2--> started bridge back to coumadin tonight with lovenox. Started after apheresis line placement will start coumadin 5mg and start therapeutic lovenox 120mg BID x5 days AND until INR therepeutic x24 hours (regimen discussed with pharmacy)  - 10/10 INR 1.6   - Home coumadin 5mg daily dose resumed 10/12  - 10/13 INR 1.9   - Follows with Coumadin clinic outpatient  - Plan to consult vascular medicine if INR is not therapeutic on home regimen      # CAN  - Continue home CPAP and home Albuterol PRN     # H/O Cauda Equine syndrome  - Follows Dr. Delacruz as outpatient  - No current issues      DISPO:  - Full code- confirmed on admission  - NOK: Tati Salgado (mother) (#[phone])  - PT/OT rec SNF, plan for rehab at [facility]  - DC to SNF pending pre-cert approval, likely 10/14   - Derm FUV 11/7, Sickle Cell FUV 11/18, Cardiology FUV 2/13         I spent 60 minutes in the professional and overall care of this patient.    Assessment and plan as  above discussed with attending physician, Dr. Tayo Ferrell PA-C

## 2024-10-13 NOTE — PROGRESS NOTES
Occupational Therapy    Occupational Therapy Treatment    Name: Senait Narvaez  MRN: [id number]  : 1970  Date: 10/13/24  Room: [address] [place] [address]      Time Calculation  Start Time: 1233  Stop Time: 1245  Time Calculation (min): 12 min    Assessment:  Evaluation/Treatment Tolerance: Patient limited by pain  Medical Staff Made Aware: Yes  End of Session Communication: Bedside nurse  End of Session Patient Position: Bed, 3 rail up, Alarm on  Plan:  Treatment Interventions: ADL retraining, Visual perceptual retraining, Functional transfer training, UE strengthening/ROM, Endurance training, Cognitive reorientation, Patient/family training, Neuromuscular reeducation, Fine motor coordination activities, UE splinting, Equipment evaluation/education, Compensatory technique education  OT Frequency: 3 times per week  OT Discharge Recommendations: Moderate intensity level of continued care  Equipment Recommended upon Discharge: Wheeled walker  OT Recommended Transfer Status: Assist of 2  OT - OK to Discharge: Yes    Subjective   General:  OT Last Visit  OT Received On: 10/13/24  Prior to Session Communication: Bedside nurse  Patient Position Received: Bed, 3 rail up, Alarm on  Family/Caregiver Present: No  General Comment: Pt  pleasant and agreeable to OT session   Precautions:  Medical Precautions: Fall precautions, Oxygen therapy device and L/min  Vitals:  Vital Signs (Past 2hrs)        Date/Time Vitals Session Patient Position Pulse Resp SpO2 BP MAP (mmHg)    10/13/24 1233 During OT  Lying  108  --  93 %  129/77  --     10/13/24 1243 --  --  102  18  95 %  129/77  95                   Lines/Tubes/Drains:  External Urinary Catheter Female (Active)   Number of days: 6       Hemodialysis Cath 10/07/24 Triple lumen Right Non-tunneled catheter (Active)   Number of days: 6       Cognition:  Overall Cognitive Status: Within Functional Limits  Orientation Level: Oriented X4    Pain Assessment:  Pain Assessment  Pain Assessment:  0-10  0-10 (Numeric) Pain Score: 8  Pain Type: Acute pain  Pain Location: Leg  Pain Orientation: Left  Pain Interventions: Repositioned, Warm pack  Response to Interventions: Increased when sitting EOB, improved in bed with heat pack     Objective   Activities of Daily Living:      LE Dressing  LE Dressing: Yes  LE Dressing Adaptive Equipment: Sock aide  Sock Level of Assistance: Minimum assistance  LE Dressing Where Assessed: Edge of bed  LE Dressing Comments: Education provided on use of sock aid. Demonstration provided prior to pt's attempt. She is able to don B socks with Min A for management of device. Increased time required. After donning R sock, pt moves to supine to don L sock due to pain in upright sitting.     Bed Mobility/Transfers:   Bed Mobility  Bed Mobility: Yes  Bed Mobility 1  Bed Mobility 1: Supine to sitting, Sitting to supine  Level of Assistance 1: Contact guard  Bed Mobility Comments 1: CGA for safey, use of bed rail    Transfers  Transfer: No (Denies 2/2 pain in L LE)     Balance:  Static Sitting Balance  Static Sitting-Balance Support: No upper extremity supported, Feet supported  Static Sitting-Level of Assistance: Distant supervision  Static Sitting-Comment/Number of Minutes: EOB  Therapy/Activity:      Therapeutic Activity  Therapeutic Activity 1: Education provided on improved positoning in bed for pain management. Pt provided with heat pack for L LE and LE supported on pillow     Outcome Measures:  [facility] Daily Activity  Putting on and taking off regular lower body clothing: A lot  Bathing (including washing, rinsing, drying): A lot  Putting on and taking off regular upper body clothing: A little  Toileting, which includes using toilet, bedpan or urinal: A lot  Taking care of personal grooming such as brushing teeth: A little  Eating Meals: None  Daily Activity - Total Score: 16     Education Documentation  Body Mechanics, taught by Baljinder Portillo OT at 10/13/2024  1:32 PM.  Learner:  Patient  Readiness: Acceptance  Method: Explanation, Demonstration  Response: Verbalizes Understanding    Precautions, taught by Baljinder Portillo OT at 10/13/2024  1:32 PM.  Learner: Patient  Readiness: Acceptance  Method: Explanation, Demonstration  Response: Verbalizes Understanding    ADL Training, taught by Baljinder Portillo OT at 10/13/2024  1:32 PM.  Learner: Patient  Readiness: Acceptance  Method: Explanation, Demonstration  Response: Verbalizes Understanding    Education Comments  No comments found.        Goals:  Encounter Problems       Encounter Problems (Active)       ADLs       Patient will complete toileting with SBA and min cues.   (Progressing)       Start:  09/17/24    Expected End:  10/21/24            Patient will complete LB dressing with SBA and min cues.   (Progressing)       Start:  09/17/24    Expected End:  10/21/24            Patient will complete UB dressing with set up assist.   (Progressing)       Start:  09/17/24    Expected End:  10/21/24            Patient will complete grooming with set up assist.   (Progressing)       Start:  09/17/24    Expected End:  10/21/24                   BALANCE       Patient will demo standing balance for at least 5 min with SBA in prep for standing ADLs. (Progressing)       Start:  09/17/24    Expected End:  10/21/24               COGNITION/SAFETY       Patient will score WNL on cognitive assessment. (Progressing)       Start:  09/17/24    Expected End:  10/21/24               EXERCISE/STRENGTHENING       Patient will demo UE HEP with MOD I.  (Progressing)       Start:  10/07/24    Expected End:  10/21/24               MOBILITY       Patient will complete bed mobility with SBA and min cues.    (Progressing)       Start:  09/17/24    Expected End:  10/21/24            Pt. Will demo short household distance functional mobility with SBA using LRAD.   (Progressing)       Start:  09/17/24    Expected End:  10/21/24               TRANSFERS       Pt. Will complete stand  pivot transfer with SBA assist with min cues and using LRAD.   (Progressing)       Start:  09/17/24    Expected End:  10/21/24                 10/13/24 at 1:33 PM   Baljinder Portillo, OT   [phone]

## 2024-10-14 LAB
ALBUMIN SERPL BCP-MCNC: 2.1 G/DL (ref 3.4–5)
ALP SERPL-CCNC: 140 U/L (ref 33–110)
ALT SERPL W P-5'-P-CCNC: 19 U/L (ref 7–45)
ANION GAP SERPL CALC-SCNC: 10 MMOL/L (ref 10–20)
AST SERPL W P-5'-P-CCNC: 34 U/L (ref 9–39)
BASOPHILS # BLD AUTO: 0.02 X10*3/UL (ref 0–0.1)
BASOPHILS NFR BLD AUTO: 0.3 %
BILIRUB SERPL-MCNC: 1.3 MG/DL (ref 0–1.2)
BUN SERPL-MCNC: 14 MG/DL (ref 6–23)
CALCIUM SERPL-MCNC: 8.2 MG/DL (ref 8.6–10.6)
CHLORIDE SERPL-SCNC: 96 MMOL/L (ref 98–107)
CO2 SERPL-SCNC: 36 MMOL/L (ref 21–32)
CREAT SERPL-MCNC: 1.01 MG/DL (ref 0.5–1.05)
EGFRCR SERPLBLD CKD-EPI 2021: 66 ML/MIN/1.73M*2
EOSINOPHIL # BLD AUTO: 0.23 X10*3/UL (ref 0–0.7)
EOSINOPHIL NFR BLD AUTO: 3.3 %
ERYTHROCYTE [DISTWIDTH] IN BLOOD BY AUTOMATED COUNT: 18.2 % (ref 11.5–14.5)
GLUCOSE SERPL-MCNC: 72 MG/DL (ref 74–99)
HCT VFR BLD AUTO: 25.3 % (ref 36–46)
HGB BLD-MCNC: 8.2 G/DL (ref 12–16)
HGB RETIC QN: 30 PG (ref 28–38)
IMM GRANULOCYTES # BLD AUTO: 0.02 X10*3/UL (ref 0–0.7)
IMM GRANULOCYTES NFR BLD AUTO: 0.3 % (ref 0–0.9)
IMMATURE RETIC FRACTION: 13.4 %
INR PPP: 2.1 (ref 0.9–1.1)
LDH SERPL L TO P-CCNC: 240 U/L (ref 84–246)
LYMPHOCYTES # BLD AUTO: 1.25 X10*3/UL (ref 1.2–4.8)
LYMPHOCYTES NFR BLD AUTO: 17.8 %
MAGNESIUM SERPL-MCNC: 1.8 MG/DL (ref 1.6–2.4)
MCH RBC QN AUTO: 29.6 PG (ref 26–34)
MCHC RBC AUTO-ENTMCNC: 32.4 G/DL (ref 32–36)
MCV RBC AUTO: 91 FL (ref 80–100)
MONOCYTES # BLD AUTO: 0.98 X10*3/UL (ref 0.1–1)
MONOCYTES NFR BLD AUTO: 13.9 %
NEUTROPHILS # BLD AUTO: 4.53 X10*3/UL (ref 1.2–7.7)
NEUTROPHILS NFR BLD AUTO: 64.4 %
NRBC BLD-RTO: 3.7 /100 WBCS (ref 0–0)
PLATELET # BLD AUTO: 251 X10*3/UL (ref 150–450)
POTASSIUM SERPL-SCNC: 4.5 MMOL/L (ref 3.5–5.3)
PROT SERPL-MCNC: 5.2 G/DL (ref 6.4–8.2)
PROTHROMBIN TIME: 23.4 SECONDS (ref 9.8–12.8)
RBC # BLD AUTO: 2.77 X10*6/UL (ref 4–5.2)
RETICS #: 0.19 X10*6/UL (ref 0.02–0.08)
RETICS/RBC NFR AUTO: 6.8 % (ref 0.5–2)
SODIUM SERPL-SCNC: 137 MMOL/L (ref 136–145)
URATE SERPL-MCNC: 7.9 MG/DL (ref 2.3–6.7)
WBC # BLD AUTO: 7 X10*3/UL (ref 4.4–11.3)

## 2024-10-14 PROCEDURE — 2500000004 HC RX 250 GENERAL PHARMACY W/ HCPCS (ALT 636 FOR OP/ED)

## 2024-10-14 PROCEDURE — 2500000001 HC RX 250 WO HCPCS SELF ADMINISTERED DRUGS (ALT 637 FOR MEDICARE OP): Performed by: NURSE PRACTITIONER

## 2024-10-14 PROCEDURE — 36415 COLL VENOUS BLD VENIPUNCTURE: CPT | Performed by: PHYSICIAN ASSISTANT

## 2024-10-14 PROCEDURE — 80053 COMPREHEN METABOLIC PANEL: CPT | Performed by: NURSE PRACTITIONER

## 2024-10-14 PROCEDURE — 83735 ASSAY OF MAGNESIUM: CPT | Performed by: PHYSICIAN ASSISTANT

## 2024-10-14 PROCEDURE — 99233 SBSQ HOSP IP/OBS HIGH 50: CPT

## 2024-10-14 PROCEDURE — 85045 AUTOMATED RETICULOCYTE COUNT: CPT

## 2024-10-14 PROCEDURE — 2500000002 HC RX 250 W HCPCS SELF ADMINISTERED DRUGS (ALT 637 FOR MEDICARE OP, ALT 636 FOR OP/ED): Performed by: NURSE PRACTITIONER

## 2024-10-14 PROCEDURE — 2500000001 HC RX 250 WO HCPCS SELF ADMINISTERED DRUGS (ALT 637 FOR MEDICARE OP)

## 2024-10-14 PROCEDURE — 1170000001 HC PRIVATE ONCOLOGY ROOM DAILY

## 2024-10-14 PROCEDURE — 85025 COMPLETE CBC W/AUTO DIFF WBC: CPT | Performed by: PHYSICIAN ASSISTANT

## 2024-10-14 PROCEDURE — 84550 ASSAY OF BLOOD/URIC ACID: CPT

## 2024-10-14 PROCEDURE — 85610 PROTHROMBIN TIME: CPT | Performed by: NURSE PRACTITIONER

## 2024-10-14 PROCEDURE — 97530 THERAPEUTIC ACTIVITIES: CPT | Mod: GP

## 2024-10-14 PROCEDURE — 83615 LACTATE (LD) (LDH) ENZYME: CPT

## 2024-10-14 RX ORDER — OXYCODONE HYDROCHLORIDE 5 MG/1
5 TABLET ORAL EVERY 6 HOURS PRN
Status: DISCONTINUED | OUTPATIENT
Start: 2024-10-14 | End: 2024-10-21 | Stop reason: HOSPADM

## 2024-10-14 RX ORDER — FUROSEMIDE 10 MG/ML
20 INJECTION INTRAMUSCULAR; INTRAVENOUS ONCE
Status: COMPLETED | OUTPATIENT
Start: 2024-10-14 | End: 2024-10-14

## 2024-10-14 ASSESSMENT — COGNITIVE AND FUNCTIONAL STATUS - GENERAL
TURNING FROM BACK TO SIDE WHILE IN FLAT BAD: A LITTLE
HELP NEEDED FOR BATHING: A LITTLE
MOBILITY SCORE: 13
HELP NEEDED FOR BATHING: A LITTLE
WALKING IN HOSPITAL ROOM: TOTAL
MOVING TO AND FROM BED TO CHAIR: A LOT
EATING MEALS: A LITTLE
WALKING IN HOSPITAL ROOM: A LOT
DAILY ACTIVITIY SCORE: 19
PERSONAL GROOMING: A LITTLE
MOVING FROM LYING ON BACK TO SITTING ON SIDE OF FLAT BED WITH BEDRAILS: A LITTLE
MOBILITY SCORE: 11
MOVING TO AND FROM BED TO CHAIR: A LOT
TURNING FROM BACK TO SIDE WHILE IN FLAT BAD: A LITTLE
DRESSING REGULAR UPPER BODY CLOTHING: A LITTLE
STANDING UP FROM CHAIR USING ARMS: A LOT
PERSONAL GROOMING: A LITTLE
MOBILITY SCORE: 13
DRESSING REGULAR LOWER BODY CLOTHING: A LITTLE
DRESSING REGULAR UPPER BODY CLOTHING: A LITTLE
TOILETING: A LITTLE
CLIMB 3 TO 5 STEPS WITH RAILING: TOTAL
DRESSING REGULAR LOWER BODY CLOTHING: A LITTLE
MOVING FROM LYING ON BACK TO SITTING ON SIDE OF FLAT BED WITH BEDRAILS: A LITTLE
DAILY ACTIVITIY SCORE: 18
STANDING UP FROM CHAIR USING ARMS: A LOT
MOVING FROM LYING ON BACK TO SITTING ON SIDE OF FLAT BED WITH BEDRAILS: A LITTLE
CLIMB 3 TO 5 STEPS WITH RAILING: TOTAL
CLIMB 3 TO 5 STEPS WITH RAILING: TOTAL
STANDING UP FROM CHAIR USING ARMS: A LOT
WALKING IN HOSPITAL ROOM: A LOT
MOVING TO AND FROM BED TO CHAIR: TOTAL
TURNING FROM BACK TO SIDE WHILE IN FLAT BAD: A LITTLE
TOILETING: A LITTLE

## 2024-10-14 ASSESSMENT — PAIN - FUNCTIONAL ASSESSMENT
PAIN_FUNCTIONAL_ASSESSMENT: 0-10

## 2024-10-14 ASSESSMENT — PAIN SCALES - GENERAL
PAINLEVEL_OUTOF10: 8
PAINLEVEL_OUTOF10: 7
PAINLEVEL_OUTOF10: 8
PAINLEVEL_OUTOF10: 7
PAINLEVEL_OUTOF10: 8
PAINLEVEL_OUTOF10: 7
PAINLEVEL_OUTOF10: 8

## 2024-10-14 ASSESSMENT — PAIN DESCRIPTION - DESCRIPTORS: DESCRIPTORS: THROBBING;STABBING;SHOOTING;SHARP

## 2024-10-14 ASSESSMENT — ACTIVITIES OF DAILY LIVING (ADL): EFFECT OF PAIN ON DAILY ACTIVITIES: DECREASED MOBILITY

## 2024-10-14 NOTE — PROGRESS NOTES
Senait Narvaez is a 54 y.o. female on day 34 of admission presenting with Sickle cell disease with crisis and other complication.    Subjective   Patient seen resting in bed. Feels that pain is well controlled overall but does have increase pain left ankle especially with ambulation. We discussed pending CT read that was completed on 10/13. Also discussed working with PT this morning so that pre-cert can be started for SNF. We discussed plan for extra dose of oxycodone prior to PT so that patient is able to participate. Also pan for walking pulse ox today as patient still requiring continuous O2. Patient also c/o dry throat and bloody nose overnight that she contributes to CPAP, plan to order throat lozenges today. She is eating and drinking well and having bowel movements. Otherwise denies shortness of breath, chest pain, abdominal pain, N/V/D, F/C, H/A.        Objective     Physical Exam  Constitutional:       Appearance: Normal appearance.   HENT:      Head: Normocephalic.      Mouth/Throat:      Mouth: Mucous membranes are moist.   Eyes:      Pupils: Pupils are equal, round, and reactive to light.   Cardiovascular:      Rate and Rhythm: Normal rate and regular rhythm.      Pulses: Normal pulses.      Heart sounds: Normal heart sounds.   Pulmonary:      Effort: Pulmonary effort is normal.      Breath sounds: Normal breath sounds.      Comments: 2L NC in place  Abdominal:      General: Abdomen is flat. Bowel sounds are normal.      Palpations: Abdomen is soft.   Musculoskeletal:      Cervical back: Normal range of motion and neck supple.      Right lower leg: Edema present.      Left lower leg: Edema present.      Comments: 2+ edema of b/l lower extremities   2+ strengh in b/l LE  Equal pulses      Skin:     General: Skin is warm.      Comments: Apheresis line in place without surrounding erythema or edema   Dry and resolving rash in inguinal area as well as chest wall   Neurological:      General: No focal deficit  "present.      Mental Status: She is alert.   Psychiatric:         Mood and Affect: Mood normal.         Last Recorded Vitals  Blood pressure 106/68, pulse 77, temperature 36.6 °C (97.9 °F), temperature source Temporal, resp. rate 18, height 1.651 m (5' 5\"), weight 113 kg (248 lb 7.3 oz), SpO2 98%.  Intake/Output last 3 Shifts:  I/O last 3 completed shifts:  In: 254.2 (2.3 mL/kg) [IV Piggyback:254.2]  Out: 2950 (26.2 mL/kg) [Urine:2950 (0.7 mL/kg/hr)]  Weight: 112.7 kg     Relevant Results               This patient has a central line   Reason for the central line remaining today? Hemodynamic monitoring      Scheduled medications  folic acid, 1 mg, oral, Daily  furosemide, 20 mg, oral, Every other day  [Held by provider] metoprolol tartrate, 12.5 mg, oral, BID  nystatin, 1 Application, Topical, BID  oxygen, , inhalation, Continuous - Inhalation  pantoprazole, 40 mg, oral, Daily before breakfast  polyethylene glycol, 17 g, oral, BID  sennosides, 2 tablet, oral, BID  thiamine, 100 mg, oral, Daily  triamcinolone, , Topical, BID  warfarin, 5 mg, oral, Daily  white petrolatum, , Topical, BID      Continuous medications     PRN medications  PRN medications: albuterol, alteplase, benzocaine-menthol, bisacodyl, dextrose, dextrose, diphenhydrAMINE, glucagon, glucagon, guaiFENesin, naloxone, ondansetron, oxyCODONE, oxyCODONE, oxygen, sodium chloride             Assessment/Plan   Assessment & Plan  Sickle cell disease with crisis and other complication    Senait Narvaez is a 54 year-old female with a PMHx of Hgb SC disease (previously on exchange transfusions q4-6 weeks, last transfusion 3/1/24), hx of SVC syndrome, recurrent DVT/PE (on lifelong Coumadin), pHTN (6/2023), cauda equina with saddle numbness, hx SVT, fibromyalgia, Raynaud's disease, CKD II (baseline SCr~<2) and CAN who presented to [facility] ED for diffuse pain and lethargy, then transferred to  MICU for hemodynamic instability. Patient was subsequently intubated due to " worsening lethargic and lack of respiratory compensation from significant metabolic acidosis with concern for ACS. Vancomycin and Zosyn started for empiric coverage, patient also started on pressors for BP support. Patient was transfused 2 units pRBCs and then underwent RBCEx on 9/11. Cardiology consulted for c/f NSTEMI vs demand ischemia (ST depressions and elevated troponin), rec treat as ACS. Course further c/b severe CHARLES with peak sCr of 5.4, and acute liver injury with severely elevated liver enzymes (ALT 4119, AST >10k). Liver US only remarkable for fatty infiltration, viral hepatitis panel negative. CHARLES and liver injury improved with supportive management. Pruritic rash noted 9/14, Derm consulted and took multiple biopsies 9/15. Rash then began to worsen to involve the groin, lateral thigh, and scalp with numerous tense bullae. Discontinued heparin given concerns for potential HIT, started bivalirudin. However PF4 was negative. Patient was extubated on 9/16/2024. Started stress dose steroids given continued pressor requirements, pressors Dc'd 9/17.      Patient transferred to [facility] 9/17, PT/OT rec SNF. Coumadin bridge was started 9/18. Given persistent rash with unremarkable labs, derm re-biopsied on 9/18, findings ultimately felt to be most c/w fixed drug eruption. Dermatology contacted for bx results (9/20) rec triamcinolone cream BID and interdry cloths. Leukocytosis uptrending 9/21, repeat infectious workup negative (CXR, Abd XR, blood cultures, MRSA negative 9/21). UA with leuks. On 9/25, per nursing and patient, rash seems to be worsening. Derm re-engaged recommended to apply vasoline to all eroded/denuded areas and continue triamcinolone cream to both the black plaques as well as the red areas across the trunk and thighs. 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 Wound care following. For warfarin dosing,  per pharmacy, if INR between 2-3 can restart home warfarin 5mg daily, if INR < 2 restart bival. INR 3.4 on 9/25, warfarin stopped, repeat INR 2.8 9/26 and warfarin restarted. On 9/26, increased PO oxycodone and discontinued IV dilaudid in anticipation for discharge. Overnight on 9/29, brain attack called out of concern for change in mental status, no focal deficits noted, BAT cancelled and narcan administered x3 with improvement of mental status. Mentation improved as of 10/2, small dose oxy resumed. INR supratherapeutic 10/3 at 6.7, 5mg IV Vit K x1 given (ok per pharmacy) I/s/o ?bleeding from vagina vs wound excoriation and UA with +blood. INR  down to 1.5 (10/4). Plan for RBCEx prior to dc per Dr. Whaley, planned 10/8. 10/5 ordered duplex BLE as pt c/o severe LLE pain and swelling which was negative. Left ankle x-ray (10/8) negative for acute fractures, showed soft tissue edema. 10/13 CT of left ankle ordered for continued pain, pending. 10/5 started heparin drip (per attending) given normal INR while pending apheresis line placement (previous HIT workup with negative PF4). 10/7 started bridge back to coumadin with lovenox after apheresis line. Continue therapeutic Lovenox 120mg BID with Coumadin 5mg nightly x5 days and until INR is in therapeutic range x24 hours. Home Coumadin DC to SNF pending precert approval.      # Waxing and waning AMS-- resolved   - Felt to be due to opioids i/s/o worsening renal function and c/b respiratory acidosis  - All opioids on hold (9/28-10/2)  - 10/2 resumed oxycodone 10mg q3 hours as AMS now resolved   - continue to hold extended release oxycontin   - Keep supplemental O2 on board, but titrate to maintain SPO2 of 90-92%   - On CPAP at home, enforce nightly use to extent possible; may need BiPAP if unresolved     # CHARLES on CKD II   - Baseline ~ SCr <2, peak 5.4 this admission. 9/21 sCr 1.58 --> 1.13 (9/25)--> 1.14 (10/3)--> 0.96 (10/6)--> 0.99 (10/7) --> 0.97 (10/8) --> 1.30 (10/10)   --> 1.08 (10/13) --> 1.01 (10/14)   - Likely pre-renal vs ATN given persistant hypotension over admission, on CKD II, now polyuric phase  - FeNA 0.4%  - Severe hyperkalemia with previous peaked T waves on EKG, resolved  - Nephrology following, now signed off   - s/p multiple K cocktails in MICU  - Encourage increased PO intake        # Purpuric rash, suspected fixed drug eruption  # Concern for possible heparin-induced thrombocytopenia (HIT) -resolved  - Purple, reticular pattern of plaques noted around the bilateral breasts, scalp and groin,  with tense bullae. Non-erythematous, no purulence  - Some initial concern for possible heparin-induced thrombocytopenia with concurrent skin findings. Discontinued heparin 9/16 and transitioned to bivalirudin  - 4 T score 0, PF4 w/ reflex BINA negative. Hematology ultimately consulted, felt there was low concern for HIT and signed off  - Differential diagnosis includes purpura secondary to infection versus coagulopathy versus vasculopathy versus small and/or medium vessel vasculitis versus calciphylaxis vs drug rash  - Dermatology consulted, work-up to date:  - Low Protein C activity  - ANCA, PR3 and MPO negative  - ISABELLE neg  - cryoglobulin labs- not enough specimen to analyze  - S/p skin punch biopsies x 2 9/15 and one on 9/18, showed SUPERFICIAL EPIDERMAL DEGENERATION WITH ERYTHROCYTE EXTRAVASATION WITH NEUTROPHILS. These findings could be seen secondary to an older trauma, or resolving erythema multiforme, a fixed drug eruption, Edgar-Christopher syndrome, or toxic epidermal necrolysis. Favor Multifocal Fixed Drug Eruption    - Dermatology contacted (9/20) about results, recommended adding vancomycin to allergy list and starting triamcinolone  - Continue triamcinolone 0.1% cream BID (9/20-current) per derm recs  - On 9/25, concern from pt and nursing that rash is not improving. Under breasts and groin still causing pain. Bilateral upper thighs and forehead rash improving   -  Wound care re-consulted 9/25, s/o 10/3 as derm gave recs below (wound pictures documented in WC note from 10/3)  - Re-engaged dermatology on 9/25 given worsening rash; recommended to apply vasoline to all eroded/denuded areas and continue triamcinolone cream to both the black plaques as well as the red areas across the trunk and thighs. Per dermatology, erythema is mild and not palpable, it is most likely part of the drug eruption the patient has previously been known to have     # Leukocytosis-- resolved   - Likely I/s/o rash vs reactive vs infectious process  - Leukocytosis noted on admission to MICU (WBC 24.8) --> 12.7 (9/25) --> 8.3 (10/3) --> as of 10/14, leukocytosis continues to be resolved   - Blood cultures 9/10, 9/12, 9/15 all NGTD  - Strep and Legionella Ag, MRSA nares negative  - CXR (9/21) largely unremarkable, again re-demonstrated perihilar prominence seen on prior XR  - Abdominal xray (9/21) unremarkable  - Repeat blood cultures x 2 (9/21) NGTD  - UA (9/21) with 500 LE  - UA (9/30) +bacteria, +leuks, urine culture (9/29) mult organisms  - UA sent 10/3 with +blood, leuks, and WBC- not reflex to cx so UCX added on to 10/3 UA and again showed multiple organisms  - Will hold off tx for UTI as pt asymptomatic and leukocytosis improved. 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  # ACS  - Previously managed through routine RBC exchanges q6 weeks, most recent RBCEx 3/1/24, per patient pausing on transfusions for time being  - OARRS reviewed, no aberrant behavior noted  - LDH>12,000 (now downtrending), haptoglobin <30, fibrinogen 318, retic % 5.2 on presentation, bili 10.7  - Leukocytosis 24.8 on admission to MICU  - CXR (9/10) persistent atelectasis/scarring in the left lower lung  - CT CAP (9/11) Diffuse mosaic attenuation of the lung parenchyma, which can be seen in the setting of small airway disease, pulmonary edema or acute infection  - Intubated upon arrival to MICU, now s/p  extubation 9/16  - s/p Vanc and Zosyn (finished both courses prior to floor transfer)  - Procal elevated 7.09 (9/11)  - Blood cultures 9/10, 9/12, 9/15 all NGTD  - Strep and Legionella Ag, MRSA nares negative  - Trop peak 1431, cards rec treat as ACS (see below)  - S/p 2u pRBCs 9/10 on arrival to MICU  - S/p exchange transfusion 9/11 AM for ACS  - S/p routine exchange transfusion 10/8 per Dr. Whaley   - Care plan from 12/6/23- For mild to moderate pain: Dilaudid 0.5mg IVP q3h as needed, for moderate to severe pain Dilaudid 1- 1.5mg q3h PRN  - On arrival to [facility], started on 0.6mg dilaudid IVP q3h PRN severe pain   - s/p 0.6mg dilaudid IVP q4h PRN breakthrough and 15mg PO oxycodone Q4 hrs severe pain (9/25)  - Per discussion with Dr. Whaley (9/25), okay to increase PO oxycodone to 15-20mg Q4hr to optimize pain for discharge to SNF.   - On 9/26, discontinued IV dilaudid and increased to 20mg PO oxycodone q4hrs for severe pain   - Opioids on hold as of 9/28 d/t AMS- resumed 2.5mg oxy q4hrs PRN severe pain on 10/2. ER oxy 10mg BID still held  - (10/5- current) increased pain reg as AMS resolved--> continue oxycodone 10mg q3hrs PRN severe pain  - Continue to hold ER Oxycontin indefinitely as was major contributor to AMS (was started in ICU and was not on this prior to admission)  - Bowel regimen for opioid induced constipation with Senna 2tabs BID, Miralax BID, and dulcolax suppository 10mg PRN constipation  - Zofran for opioid induced nausea, no benadryl ordered (assumed from previous AMS but also no reports of opioid-induced pruritus)  - Continue home Folic Acid 1mg daily, continue thiamine 100mg daily     # ST depression with tropinemia, suspected 2/2 demand ischemia iso ACS  # Hx SVT  - Elevated troponins on admission, peak 1431 and now downtrending   - EKG with ST depressions, likely Type II MI due to vaso-occlusive crisis and anemia  - TTE 9/11: EF 60-65%, RV enlarged and reduced in function which appears stable when  compared to last TTE (1/2024)  - Troponin leak is likely due to cardiac demand vs supply mismatch in the setting of worsening anemia and vaso-occlusive crisis  - Cardiology consulted, now signed off with indication to treat troponemia as ACS  - Lasix held in MICU 2/2 hypotension and CHARLES, can resume as able   - Metop 12.5mg BID held in MICU 2/2 hypotension- BP and HR continue to remain stable, holding off on restarting for now  - s/p 1x dose of po lasix 20mg on 10/9 and s/p IVP 20mg lasix on 10/10 for volume overload, plan to resume home dose lasix once LE edema back to patient's baseline   - home dose of laix of 20mg po resumed on 10/12  - 10/14 s/p 1x dose of IVP lasix for increased LE edema   - Follow-up with Cardiology outpt, FUV 2/13/25     # Acute liver injury, improving  # Direct hyperbilirubinuria, improving  - Suspected 2/2 hemodynamic instability versus sickle cell crisis  - On presentation to  MICU ALT 2611, AST 4727, T bili 10.2, INR 3.0; continues to improve  - CT with hepatic venous congestion, patient lacks gallbladder  - Liver US with Doppler: Diffuse fatty infiltration, unremarkable doppler interrogation of the liver  - EBV IgG positive, IgM negative  - CMV IgG positive, PCR negative  - Acute hepatitis panel unremarkable  - Hepatology initially following, now signed off  - Will continue to monitor, trend daily CMP     # pHTN   - Initial c/f CTEPH as imaging findings with dilated PA, filling defects, and mosaicism  - VQ scan (6/13/23) with non anatomical basal defects and RUL defect due to scar--> not favoring CTEPH per Dr. Yi and Dr. 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 On lifelong anticoagulation, DOAC discouraged due to high risk of clotting   - Home Coumadin 5mg daily initially HELD on admission to MICU 2/2 unstable course  - Restarted coumadin 9/18 PM, will plan to bridge with bival, Ok'd by heme   - 9/20 INR 3.8, bival and home warfarin held. Pharmacy rec repeat INR 4 hours after bival was held. If the INR < 2, we can restart the bival, but if the INR 2-3, we can continue with warfarin monotherapy   - 9/20 repeat INR 3.7, pharmacy rec continuing to hold both medications  - 9/21 INR 3.2, per pharmacy - get repeat INR in AM 9/22, if INR between 2-3 can restart home warfarin 5mg daily  -INR 2.6 (9/24) after restarting Warfarin 5mg, will maintain INR goal of 2-3  - INR 3.4 (9/25). Held warfarin on 9/25. Repeat INR 9/26. Plan to restart if <3   - INR 2.8 (9/26), restarted warfarin, recheck INR 9/27   - INR 4.0 (9/30), held warfarin, repeat INR 10/1 ordered, plan to restart if <3  - INR 6.8 (10/1), patient without any active bleeding, per attending and pharmacy, warfarin held   - INR 6.8 (10/2), patient without any active bleeding, per attending and pharmacy, warfarin held   - INR 6.7 (10/3), cont to hold warfarin. Ordered 5mg IV Vit K x1 (ok per pharmacy) d/t ?bleeding, vaginal vs wound excoriation and UA +blood  - 10/4 INR normalized to 1.6, continue to hold warfarin  - 10/5 pt c/o severe LLE pain and swelling; duplex BLE negative  - 10/5 INR 1.2; started heparin drip given normal INR while pending apheresis line placement (previous HIT workup with negative PF4)--> held 10/7 at 0400 for apheresis line placement  - 10/7 INR 1.2--> started bridge back to coumadin tonight with lovenox. Started after apheresis  line placement will start coumadin 5mg and start therapeutic lovenox 120mg BID x5 days AND until INR therepeutic x24 hours (regimen discussed with pharmacy)  - 10/10 INR 1.6   - Home coumadin 5mg daily dose resumed 10/12  - 10/13 INR 1.9   - 10/14 therepeutic at 2.1   - Follows with Coumadin clinic outpatient  - Plan to consult vascular medicine if INR is not therapeutic on home regimen      # CAN  - Continue home CPAP and home Albuterol PRN     # H/O Cauda Equine syndrome  - Follows Dr. Delacruz as outpatient  - No current issues      DISPO:  - Full code- confirmed on admission  - NOK: Tati Salgado (mother) (#[phone])  - PT/OT rec SNF, plan for rehab at [facility]  - DC to SNF pending pre-cert approval  - Derm FUV 11/7, Sickle Cell FUV 11/18, Cardiology FUV 2/13       I spent 60 minutes in the professional and overall care of this patient.    Assessment and plan as above discussed with attending physician, Dr. Shellie Ferrell, PAGualbertoC

## 2024-10-14 NOTE — PROGRESS NOTES
Physical Therapy    Physical Therapy Treatment    Patient Name: Senait Narvaez  MRN: [id number]  Department: [facility]  Room: [facility]4023-  Today's Date: 10/14/2024  Time Calculation  Start Time: 1121  Stop Time: 1154  Time Calculation (min): 33 min         Assessment/Plan   PT Assessment  PT Assessment Results: Decreased strength, Decreased endurance, Impaired balance, Decreased mobility, Orthopedic restrictions, Pain  Rehab Prognosis: Good  Barriers to Discharge: none  Evaluation/Treatment Tolerance: Patient limited by fatigue, Patient limited by pain  Medical Staff Made Aware: Yes  Strengths: Attitude of self, Coping skills  Barriers to Participation: Comorbidities  End of Session Communication: Bedside nurse  Assessment Comment: The pt presented with intolerable L LE pain in standing using the wheeled walker which limited her ability to put pressure on it.  End of Session Patient Position: Bed, 3 rail up, Alarm off, not on at start of session  PT Plan  Inpatient/Swing Bed or Outpatient: Inpatient  PT Plan  Treatment/Interventions: Bed mobility, Transfer training, Gait training, Stair training, Balance training, Strengthening, Endurance training, Therapeutic exercise, Therapeutic activity, Home exercise program  PT Plan: Ongoing PT  PT Frequency: 3 times per week  PT Discharge Recommendations: Moderate intensity level of continued care  Equipment Recommended upon Discharge: Wheeled walker  PT Recommended Transfer Status: Assist x1  PT - OK to Discharge: Yes      General Visit Information:   PT  Visit  PT Received On: 10/14/24  Response to Previous Treatment: Patient reporting fatigue but able to participate.  General  Prior to Session Communication: Bedside nurse  Patient Position Received: Bed, 3 rail up, Alarm off, not on at start of session  Preferred Learning Style: verbal, visual, written  General Comment: The pt was pleasant, cooperative and willing to participate in therapy.    Subjective    Precautions:  Precautions  Hearing/Visual Limitations: Hearing and vision WFL with glasses.  Medical Precautions: Fall precautions, Oxygen therapy device and L/min  Precautions Comment: Pt in compliance with precautions throughout PT session.    Vital Signs (Past 2hrs)        Date/Time Vitals Session Patient Position Pulse Resp SpO2 BP MAP (mmHg)    10/14/24 1121 During PT  Lying  83  --  99 %  --  --                   Vital Signs Comment: vitals stable     Objective   Pain:  Pain Assessment  Pain Assessment: 0-10  0-10 (Numeric) Pain Score: 7  Pain Type: Acute pain  Pain Location: Ankle  Pain Orientation: Left  Pain Radiating Towards: left knee  Pain Descriptors: Throbbing, Stabbing, Shooting, Sharp  Pain Frequency: Constant/continuous  Pain Onset: Ongoing  Clinical Progression: Gradually worsening (with movement and pressure)  Effect of Pain on Daily Activities: Decreased mobility  Patient's Stated Pain Goal: No pain  Pain Interventions: Therapeutic presence  Response to Interventions: Pain increased  Cognition:  Cognition  Overall Cognitive Status: Within Functional Limits  Orientation Level: Oriented X4  Following Commands: Follows all commands and directions without difficulty  Coordination:  Movements are Fluid and Coordinated: Yes  Coordination Comment: WFL  Postural Control:  Postural Control  Postural Control: Within Functional Limits  Posture Comment: Pt presented with good sitting and standing posture using a wheeled walker.  Static Sitting Balance  Static Sitting-Balance Support: Bilateral upper extremity supported, Feet supported  Static Sitting-Level of Assistance: Distant supervision  Static Sitting-Comment/Number of Minutes: Sitting EOB  Dynamic Sitting Balance  Dynamic Sitting-Balance Support: Bilateral upper extremity supported, Feet supported  Dynamic Sitting-Level of Assistance: Distant supervision  Dynamic Sitting-Comments: Sitting EOB  Static Standing Balance  Static Standing-Balance Support:  Bilateral upper extremity supported  Static Standing-Level of Assistance: Minimum assistance  Static Standing-Comment/Number of Minutes: using a wheeled walker  Dynamic Standing Balance  Dynamic Standing-Balance Support: Bilateral upper extremity supported  Dynamic Standing-Level of Assistance: Minimum assistance  Dynamic Standing-Comments: using a wheeled walker  Extremity/Trunk Assessments:     RUE   RUE : Within Functional Limits  LUE   LUE: Within Functional Limits  RLE   RLE : Exceptions to WFL  AROM RLE (degrees)  RLE AROM Comment: WFL  Strength RLE  R Hip Flexion: 3+/5  R Knee Flexion: 4-/5  R Knee Extension: 4-/5  R Ankle Dorsiflexion: 4+/5  R Ankle Plantar Flexion: 4+/5  LLE   LLE : Exceptions to WFL  Strength LLE  L Hip Flexion: 3/5  L Knee Flexion: 3/5  L Knee Extension: 3/5  L Ankle Dorsiflexion: 3+/5  L Ankle Plantar Flexion: 3+/5  Activity Tolerance:  Activity Tolerance  Endurance: Decreased tolerance for upright activites  Treatments:  Therapeutic Exercise  Therapeutic Exercise Performed: No    Therapeutic Activity  Therapeutic Activity Performed: Yes  Therapeutic Activity 1: Pt performed OOB activity using the wheeled walker.    Balance/Neuromuscular Re-Education  Balance/Neuromuscular Re-Education Activity Performed: Yes  Balance/Neuromuscular Re-Education Activity 1: Supervision assistance static sitting balance using Raul UE and LE support.  Balance/Neuromuscular Re-Education Activity 2: Supervision assistance dynamic sitting balance using Raul UE and LE support.  Balance/Neuromuscular Re-Education Activity 3: Minimal assistance static standing balance using a wheeled walker.  Balance/Neuromuscular Re-Education Activity 4: Minimal assistance dynamic standing balance using a wheeled walker.    Bed Mobility  Bed Mobility: Yes  Bed Mobility 1  Bed Mobility 1: Supine to sitting  Level of Assistance 1: Close supervision  Bed Mobility Comments 1: HOB elevated  Bed Mobility 2  Bed Mobility  2: Sitting to  supine  Level of Assistance 2: Minimum assistance  Bed Mobility Comments 2: HOB elevated    Ambulation/Gait Training  Ambulation/Gait Training Performed: No  Transfers  Transfer: Yes  Transfer 1  Transfer From 1: Sit to  Transfer to 1: Stand  Transfer Device 1: Walker  Transfer Level of Assistance 1: Moderate assistance  Trials/Comments 1: Bed elevated  Transfers 2  Transfer From 2: Stand to  Transfer to 2: Sit  Transfer Device 2: Walker  Transfer Level of Assistance 2: Contact guard  Trials/Comments 2: Bed elevated    Stairs  Stairs: No    Outcome Measures:  [facility] Basic Mobility  Turning from your back to your side while in a flat bed without using bedrails: A little  Moving from lying on your back to sitting on the side of a flat bed without using bedrails: A little  Moving to and from bed to chair (including a wheelchair): Total  Standing up from a chair using your arms (e.g. wheelchair or bedside chair): A lot  To walk in hospital room: Total  Climbing 3-5 steps with railing: Total  Basic Mobility - Total Score: 11    OP EDUCATION:  Outpatient Education  Individual(s) Educated: Patient  Education Provided: Body Mechanics, Fall Risk, Home Safety, Posture  Patient Response to Education: Patient/Caregiver Verbalized Understanding of Information, Patient/Caregiver Performed Return Demonstration of Exercises/Activities    Encounter Problems       Encounter Problems (Active)       PT Problem       Patient will complete supine to sit and sit to supine Independent  (Progressing)       Start:  09/17/24    Expected End:  10/18/24            Patient will perform sit<>stand transfer with LRAD, and Modified Independent  (Progressing)       Start:  09/17/24    Expected End:  10/18/24            Patient will ambulate >150' with LRAD and Modified Independent  (Progressing)       Start:  09/17/24    Expected End:  10/18/24            Patient will complete Tinetti Balance Assesment with a score equal to or better than 18 to  reduce falls risk.    (Progressing)       Start:  09/17/24    Expected End:  10/18/24               Pain - Adult

## 2024-10-14 NOTE — PROGRESS NOTES
10/10/24 1100   Discharge Planning   Who is requesting discharge planning? Patient   Home or Post Acute Services Post acute facilities (Rehab/SNF/etc)   Type of Post Acute Facility Services Skilled nursing   Expected Discharge Disposition SNF  ([facility])     Care Transitions Note  10/10/24  Patient had exchange recently and is now bridging coumadin. Plan to start pre-cert for SNF [facility] on Sunday. PT/OT notes are needed for pre-cert. Pre-cert team requests that PT/OT see patient Friday/Saturday so that pre-cert can begin Sunday in preparation for dc Monday. YULI updated whiteboard to reflect need for PT/OT with these instructions. PT made aware via secure chat. OT not signed in to patient today but will attempt to contact. Following for other dc needs.   YULI Zamorano     UPDATE 10/14/24  Pre-cert initiated by  pre-cert team. Current notes uploaded.   YULI Zamorano     UPDATE 3:36 pm 10/14  Precert Status: Approved.  [facility] Auth ID # [id number].  Dates: 10/14/2024 - 10/16/2024   Will set up transport when final go-ahead is given from medical team. Pt was getting additional work up for ankle pain. 7000 done in HENS. Facility made aware.  YULI Zamorano

## 2024-10-15 LAB
ALBUMIN SERPL BCP-MCNC: 2.3 G/DL (ref 3.4–5)
ALP SERPL-CCNC: 163 U/L (ref 33–110)
ALT SERPL W P-5'-P-CCNC: 19 U/L (ref 7–45)
ANION GAP SERPL CALC-SCNC: 8 MMOL/L (ref 10–20)
AST SERPL W P-5'-P-CCNC: 35 U/L (ref 9–39)
BASOPHILS # BLD AUTO: 0.03 X10*3/UL (ref 0–0.1)
BASOPHILS NFR BLD AUTO: 0.4 %
BILIRUB SERPL-MCNC: 1.5 MG/DL (ref 0–1.2)
BUN SERPL-MCNC: 15 MG/DL (ref 6–23)
CALCIUM SERPL-MCNC: 8.6 MG/DL (ref 8.6–10.6)
CHLORIDE SERPL-SCNC: 94 MMOL/L (ref 98–107)
CO2 SERPL-SCNC: 39 MMOL/L (ref 21–32)
CREAT SERPL-MCNC: 0.99 MG/DL (ref 0.5–1.05)
EGFRCR SERPLBLD CKD-EPI 2021: 67 ML/MIN/1.73M*2
EOSINOPHIL # BLD AUTO: 0.26 X10*3/UL (ref 0–0.7)
EOSINOPHIL NFR BLD AUTO: 3.4 %
ERYTHROCYTE [DISTWIDTH] IN BLOOD BY AUTOMATED COUNT: 18.2 % (ref 11.5–14.5)
GLUCOSE SERPL-MCNC: 74 MG/DL (ref 74–99)
HCT VFR BLD AUTO: 27.7 % (ref 36–46)
HGB BLD-MCNC: 9 G/DL (ref 12–16)
HGB RETIC QN: 30 PG (ref 28–38)
IMM GRANULOCYTES # BLD AUTO: 0.03 X10*3/UL (ref 0–0.7)
IMM GRANULOCYTES NFR BLD AUTO: 0.4 % (ref 0–0.9)
IMMATURE RETIC FRACTION: 13.4 %
INR PPP: 2.1 (ref 0.9–1.1)
LDH SERPL L TO P-CCNC: 246 U/L (ref 84–246)
LYMPHOCYTES # BLD AUTO: 1.44 X10*3/UL (ref 1.2–4.8)
LYMPHOCYTES NFR BLD AUTO: 18.7 %
MAGNESIUM SERPL-MCNC: 1.66 MG/DL (ref 1.6–2.4)
MCH RBC QN AUTO: 29.3 PG (ref 26–34)
MCHC RBC AUTO-ENTMCNC: 32.5 G/DL (ref 32–36)
MCV RBC AUTO: 90 FL (ref 80–100)
MONOCYTES # BLD AUTO: 0.89 X10*3/UL (ref 0.1–1)
MONOCYTES NFR BLD AUTO: 11.5 %
NEUTROPHILS # BLD AUTO: 5.06 X10*3/UL (ref 1.2–7.7)
NEUTROPHILS NFR BLD AUTO: 65.6 %
NRBC BLD-RTO: 1.9 /100 WBCS (ref 0–0)
PLATELET # BLD AUTO: 287 X10*3/UL (ref 150–450)
POTASSIUM SERPL-SCNC: 4.5 MMOL/L (ref 3.5–5.3)
PROT SERPL-MCNC: 5.8 G/DL (ref 6.4–8.2)
PROTHROMBIN TIME: 23.7 SECONDS (ref 9.8–12.8)
RBC # BLD AUTO: 3.07 X10*6/UL (ref 4–5.2)
RETICS #: 0.17 X10*6/UL (ref 0.02–0.08)
RETICS/RBC NFR AUTO: 5.5 % (ref 0.5–2)
SODIUM SERPL-SCNC: 136 MMOL/L (ref 136–145)
WBC # BLD AUTO: 7.7 X10*3/UL (ref 4.4–11.3)

## 2024-10-15 PROCEDURE — 85045 AUTOMATED RETICULOCYTE COUNT: CPT

## 2024-10-15 PROCEDURE — 2500000005 HC RX 250 GENERAL PHARMACY W/O HCPCS: Performed by: NURSE PRACTITIONER

## 2024-10-15 PROCEDURE — 2500000002 HC RX 250 W HCPCS SELF ADMINISTERED DRUGS (ALT 637 FOR MEDICARE OP, ALT 636 FOR OP/ED): Performed by: NURSE PRACTITIONER

## 2024-10-15 PROCEDURE — 36415 COLL VENOUS BLD VENIPUNCTURE: CPT | Performed by: NURSE PRACTITIONER

## 2024-10-15 PROCEDURE — 85610 PROTHROMBIN TIME: CPT | Performed by: NURSE PRACTITIONER

## 2024-10-15 PROCEDURE — 99233 SBSQ HOSP IP/OBS HIGH 50: CPT

## 2024-10-15 PROCEDURE — 80053 COMPREHEN METABOLIC PANEL: CPT | Performed by: NURSE PRACTITIONER

## 2024-10-15 PROCEDURE — 1170000001 HC PRIVATE ONCOLOGY ROOM DAILY

## 2024-10-15 PROCEDURE — 2500000001 HC RX 250 WO HCPCS SELF ADMINISTERED DRUGS (ALT 637 FOR MEDICARE OP): Performed by: PHYSICIAN ASSISTANT

## 2024-10-15 PROCEDURE — 2500000001 HC RX 250 WO HCPCS SELF ADMINISTERED DRUGS (ALT 637 FOR MEDICARE OP): Performed by: NURSE PRACTITIONER

## 2024-10-15 PROCEDURE — 83615 LACTATE (LD) (LDH) ENZYME: CPT

## 2024-10-15 PROCEDURE — 2500000004 HC RX 250 GENERAL PHARMACY W/ HCPCS (ALT 636 FOR OP/ED)

## 2024-10-15 PROCEDURE — 2500000001 HC RX 250 WO HCPCS SELF ADMINISTERED DRUGS (ALT 637 FOR MEDICARE OP)

## 2024-10-15 PROCEDURE — 85025 COMPLETE CBC W/AUTO DIFF WBC: CPT | Performed by: PHYSICIAN ASSISTANT

## 2024-10-15 PROCEDURE — 83735 ASSAY OF MAGNESIUM: CPT | Performed by: PHYSICIAN ASSISTANT

## 2024-10-15 RX ORDER — COLCHICINE 0.6 MG/1
1.2 TABLET ORAL ONCE
Status: COMPLETED | OUTPATIENT
Start: 2024-10-15 | End: 2024-10-15

## 2024-10-15 RX ORDER — COLCHICINE 0.6 MG/1
0.6 TABLET ORAL 2 TIMES DAILY
Status: DISCONTINUED | OUTPATIENT
Start: 2024-10-16 | End: 2024-10-16

## 2024-10-15 RX ORDER — FUROSEMIDE 10 MG/ML
40 INJECTION INTRAMUSCULAR; INTRAVENOUS ONCE
Status: COMPLETED | OUTPATIENT
Start: 2024-10-15 | End: 2024-10-15

## 2024-10-15 RX ORDER — COLCHICINE 0.6 MG/1
0.6 TABLET ORAL 2 TIMES DAILY
Start: 2024-10-16

## 2024-10-15 ASSESSMENT — COGNITIVE AND FUNCTIONAL STATUS - GENERAL
EATING MEALS: A LITTLE
HELP NEEDED FOR BATHING: A LITTLE
MOVING TO AND FROM BED TO CHAIR: A LOT
DAILY ACTIVITIY SCORE: 18
DRESSING REGULAR LOWER BODY CLOTHING: A LITTLE
STANDING UP FROM CHAIR USING ARMS: A LOT
TURNING FROM BACK TO SIDE WHILE IN FLAT BAD: A LITTLE
CLIMB 3 TO 5 STEPS WITH RAILING: TOTAL
EATING MEALS: A LITTLE
TURNING FROM BACK TO SIDE WHILE IN FLAT BAD: A LITTLE
CLIMB 3 TO 5 STEPS WITH RAILING: TOTAL
MOVING TO AND FROM BED TO CHAIR: A LOT
WALKING IN HOSPITAL ROOM: A LOT
HELP NEEDED FOR BATHING: A LITTLE
MOBILITY SCORE: 13
MOBILITY SCORE: 13
DRESSING REGULAR LOWER BODY CLOTHING: A LITTLE
DRESSING REGULAR UPPER BODY CLOTHING: A LITTLE
STANDING UP FROM CHAIR USING ARMS: A LOT
DAILY ACTIVITIY SCORE: 18
TOILETING: A LITTLE
WALKING IN HOSPITAL ROOM: A LOT
PERSONAL GROOMING: A LITTLE
PERSONAL GROOMING: A LITTLE
MOVING FROM LYING ON BACK TO SITTING ON SIDE OF FLAT BED WITH BEDRAILS: A LITTLE
MOVING FROM LYING ON BACK TO SITTING ON SIDE OF FLAT BED WITH BEDRAILS: A LITTLE
DRESSING REGULAR UPPER BODY CLOTHING: A LITTLE
TOILETING: A LITTLE

## 2024-10-15 ASSESSMENT — PAIN SCALES - GENERAL
PAINLEVEL_OUTOF10: 10 - WORST POSSIBLE PAIN
PAINLEVEL_OUTOF10: 10 - WORST POSSIBLE PAIN
PAINLEVEL_OUTOF10: 8
PAINLEVEL_OUTOF10: 10 - WORST POSSIBLE PAIN
PAINLEVEL_OUTOF10: 8
PAINLEVEL_OUTOF10: 10 - WORST POSSIBLE PAIN
PAINLEVEL_OUTOF10: 8
PAINLEVEL_OUTOF10: 8
PAINLEVEL_OUTOF10: 10 - WORST POSSIBLE PAIN

## 2024-10-15 ASSESSMENT — PAIN - FUNCTIONAL ASSESSMENT
PAIN_FUNCTIONAL_ASSESSMENT: 0-10
PAIN_FUNCTIONAL_ASSESSMENT: 0-10

## 2024-10-15 NOTE — PROGRESS NOTES
Senait Narvaez is a 54 y.o. female on day 35 of admission presenting with Sickle cell disease with crisis and other complication.    Subjective   Patient seen resting in bed. Feels that pain is well controlled overall aside from left ankle. Discussed that CT ankle was negative for fractures or infections. Disucssed that this is most likely a gout flare and discussed treatment options such as colchicine vs NSAIDs. She also thinks that over the past 48 hours her left breast has gotten hard and is concerned with changes. Discussed getting a BL breast US today after discussing changes with attending and Dr. Whaley. Patient is agreeable to plan. She is eating and drinking well and having bowel movements. Still on 1L O2. Otherwise denies shortness of breath, chest pain, abdominal pain, N/V/D, F/C, H/A.     Objective     Physical Exam  Vitals reviewed.   Constitutional:       Appearance: Normal appearance.   HENT:      Head: Normocephalic and atraumatic.      Nose: Nose normal.      Mouth/Throat:      Mouth: Mucous membranes are moist.      Pharynx: Oropharynx is clear.   Eyes:      Extraocular Movements: Extraocular movements intact.      Pupils: Pupils are equal, round, and reactive to light.   Cardiovascular:      Rate and Rhythm: Normal rate and regular rhythm.      Pulses: Normal pulses.      Heart sounds: Normal heart sounds.   Pulmonary:      Effort: Pulmonary effort is normal.      Breath sounds: Normal breath sounds.      Comments: 1L O2 NC   Chest:   Breasts:     Right: Skin change present.      Left: Swelling and skin change present.   Abdominal:      General: Bowel sounds are normal.      Palpations: Abdomen is soft.   Musculoskeletal:         General: Tenderness present. Normal range of motion.      Right lower leg: Edema present.      Left lower leg: Edema present.   Skin:     General: Skin is warm.      Comments: Apheresis line in place without surrounding erythema or edema   Dry and resolving rash in inguinal  "area as well as chest wall      Neurological:      General: No focal deficit present.      Mental Status: She is alert and oriented to person, place, and time. Mental status is at baseline.   Psychiatric:         Mood and Affect: Mood normal.         Behavior: Behavior normal.       Last Recorded Vitals  Blood pressure 114/58, pulse 84, temperature 36.9 °C (98.4 °F), temperature source Temporal, resp. rate 18, height 1.651 m (5' 5\"), weight 113 kg (248 lb 7.3 oz), SpO2 93%.  Intake/Output last 3 Shifts:  I/O last 3 completed shifts:  In: - (0 mL/kg)   Out: 2900 (25.7 mL/kg) [Urine:2900 (0.7 mL/kg/hr)]  Weight: 112.7 kg     Relevant Results  Scheduled medications  colchicine, 1.2 mg, oral, Once  folic acid, 1 mg, oral, Daily  furosemide, 40 mg, intravenous, Once  furosemide, 20 mg, oral, Every other day  [Held by provider] metoprolol tartrate, 12.5 mg, oral, BID  nystatin, 1 Application, Topical, BID  oxygen, , inhalation, Continuous - Inhalation  pantoprazole, 40 mg, oral, Daily before breakfast  polyethylene glycol, 17 g, oral, BID  sennosides, 2 tablet, oral, BID  thiamine, 100 mg, oral, Daily  triamcinolone, , Topical, BID  warfarin, 5 mg, oral, Daily  white petrolatum, , Topical, BID      Continuous medications     PRN medications  PRN medications: albuterol, alteplase, benzocaine-menthol, bisacodyl, dextrose, dextrose, diphenhydrAMINE, glucagon, glucagon, guaiFENesin, naloxone, ondansetron, oxyCODONE, oxyCODONE, oxygen, sodium chloride       Assessment/Plan   Assessment & Plan  Sickle cell disease with crisis and other complication  Senait Narvaez is a 54 year-old female with a PMHx of Hgb SC disease (previously on exchange transfusions q4-6 weeks, last transfusion 3/1/24), hx of SVC syndrome, 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 Patient was subsequently intubated due to worsening lethargic and lack of respiratory compensation from significant metabolic acidosis with concern for ACS. Vancomycin and Zosyn started for empiric coverage, patient also started on pressors for BP support. Patient was transfused 2 units pRBCs and then underwent RBCEx on 9/11. Cardiology consulted for c/f NSTEMI vs demand ischemia (ST depressions and elevated troponin), rec treat as ACS. Course further c/b severe CHARLES with peak sCr of 5.4, and acute liver injury with severely elevated liver enzymes (ALT 4119, AST >10k). Liver US only remarkable for fatty infiltration, viral hepatitis panel negative. CHARLES and liver injury improved with supportive management. Pruritic rash noted 9/14, Derm consulted and took multiple biopsies 9/15. Rash then began to worsen to involve the groin, lateral thigh, and scalp with numerous tense bullae. Discontinued heparin given concerns for potential HIT, started bivalirudin. However PF4 was negative. Patient was extubated on 9/16/2024. Started stress dose steroids given continued pressor requirements, pressors Dc'd 9/17.      Patient transferred to [facility] 9/17, PT/OT rec SNF. Coumadin bridge was started 9/18. Given persistent rash with unremarkable labs, derm re-biopsied on 9/18, findings ultimately felt to be most c/w fixed drug eruption. Dermatology contacted for bx results (9/20) rec triamcinolone cream BID and interdry cloths. Leukocytosis uptrending 9/21, repeat infectious workup negative (CXR, Abd XR, blood cultures, MRSA negative 9/21). UA with leuks. On 9/25, per nursing and patient, rash seems to be worsening. Derm re-engaged recommended to apply vasoline to all eroded/denuded areas and continue triamcinolone cream to both the black plaques as well as the red areas across the trunk and thighs. Per dermatology, erythema is mild and not palpable, it is most likely part of the drug  eruption the patient has previously been known to have. Wound care following. For warfarin dosing, per pharmacy, if INR between 2-3 can restart home warfarin 5mg daily, if INR < 2 restart bival. INR 3.4 on 9/25, warfarin stopped, repeat INR 2.8 9/26 and warfarin restarted. On 9/26, increased PO oxycodone and discontinued IV dilaudid in anticipation for discharge. Overnight on 9/29, brain attack called out of concern for change in mental status, no focal deficits noted, BAT cancelled and narcan administered x3 with improvement of mental status. Mentation improved as of 10/2, small dose oxy resumed. INR supratherapeutic 10/3 at 6.7, 5mg IV Vit K x1 given (ok per pharmacy) I/s/o ?bleeding from vagina vs wound excoriation and UA with +blood. INR  down to 1.5 (10/4). Plan for RBCEx prior to dc per Dr. Whaley, planned 10/8. 10/5 started heparin drip (per attending) given normal INR while pending apheresis line placement (previous HIT workup with negative PF4). 10/7 started bridge back to coumadin with lovenox after apheresis line. Continue therapeutic Lovenox 120mg BID with Coumadin 5mg nightly x5 days and until INR is in therapeutic range x24 hours. 10/5 ordered duplex BLE as pt c/o severe LLE pain and swelling which was negative. Left ankle x-ray (10/8) negative for acute fractures, showed soft tissue edema. CT of left ankle (10/13) with diffuse nonspecific edema without fluid collection, may represent vascular congestion, no acute osseous abnormalities, no AVN. Uric acid elevated to 7.9, started loading dose of colchicine 10/15 with plan for 0.6mg colchicine BID until 48 hours after gout flare. 10/15, noted hardening of left breast and skin changes, after discussion with attending and Dr. Whaley US Bl breasts ordered. DC to SNF pending precert approval with home coumadin dose.     # Left LE pain   # gout   - 10/5 ordered duplex BLE as pt c/o severe LLE pain and swelling which was negative  - Left ankle x-ray (10/8)  negative for acute fractures, showed soft tissue edema  - CT of left ankle (10/13) with diffuse nonspecific edema without fluid collection, may represent vascular congestion, no acute osseous abnormalities, no AVN  - Uric acid elevated to 7.9 (10/15)   - started 1.2 mg loading dose of colchicine 10/15 with plan for 0.6mg colchicine BID until 48 hours after gout flare     # Left breast skin changes   - Noted hardening and skin changes to left breast on 10/15 around site of previous infection. Non-erythematous, no purulence (see below)   - After discussion with Dr. Whaley on 10/15, he recommended obtaining US   - BL breast US complete pending     # Waxing and waning AMS---Resolved   - Felt to be due to opioids i/s/o worsening renal function and c/b respiratory acidosis  - All opioids on hold (9/28-10/2)  - 10/2 resumed oxycodone 10mg q3 hours as AMS now resolved   - continue to hold extended release oxycontin   - Keep supplemental O2 on board, but titrate to maintain SPO2 of 90-92%   - On CPAP at home, enforce nightly use to extent possible; may need BiPAP if unresolved    # CHARLES on CKD II, resolved   - Baseline ~ SCr <2, peak 5.4 this admission. 9/21 sCr 1.58 --> 1.13 (9/25)--> 1.14 (10/3)--> 0.96 (10/6) -->  1.01 (10/14) --> 0.99 (10/15)  - Likely pre-renal vs ATN given persistant hypotension over admission, on CKD II, now polyuric phase  - FeNA 0.4%  - Severe hyperkalemia with previous peaked T waves on EKG, resolved  - Nephrology following, now signed off   - s/p multiple K cocktails in MICU  - Encourage increased PO intake     # Purpuric rash, suspected fixed drug eruption  # Concern for possible heparin-induced thrombocytopenia (HIT) -resolved  - Purple, reticular pattern of plaques noted around the bilateral breasts, scalp and groin,  with tense bullae. Non-erythematous, no purulence  - Some initial concern for possible heparin-induced thrombocytopenia with concurrent skin findings. Discontinued heparin 9/16 and  transitioned to bivalirudin  - 4 T score 0, PF4 w/ reflex BINA negative. Hematology ultimately consulted, felt there was low concern for HIT and signed off  - Differential diagnosis includes purpura secondary to infection versus coagulopathy versus vasculopathy versus small and/or medium vessel vasculitis versus calciphylaxis vs drug rash  - Dermatology consulted, work-up to date:  - Low Protein C activity  - ANCA, PR3 and MPO negative  - ISABELLE neg  - cryoglobulin labs- not enough specimen to analyze  - S/p skin punch biopsies x 2 9/15 and one on 9/18, showed SUPERFICIAL EPIDERMAL DEGENERATION WITH ERYTHROCYTE EXTRAVASATION WITH NEUTROPHILS. These findings could be seen secondary to an older trauma, or resolving erythema multiforme, a fixed drug eruption, Edgar-Christopher syndrome, or toxic epidermal necrolysis. Favor Multifocal Fixed Drug Eruption    - Dermatology contacted (9/20) about results, recommended adding vancomycin to allergy list and starting triamcinolone  - On 9/25, concern from pt and nursing that rash is not improving. Under breasts and groin still causing pain. Bilateral upper thighs and forehead rash improving   - Wound care re-consulted 9/25   - Re-engaged dermatology on 9/25 given worsening rash; recommended to apply vasoline to all eroded/denuded areas and continue triamcinolone cream to both the black plaques as well as the red areas across the trunk and thighs. 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     # Leukocytosis-- resolved   - Likely I/s/o rash vs reactive vs infectious process  - Leukocytosis noted on admission to MICU (WBC 24.8) --> 12.7 (9/25) --> 8.3 (10/3) --> as of 10/14, leukocytosis continues to be resolved   - Blood cultures 9/10, 9/12, 9/15 all NGTD  - Strep and Legionella Ag, MRSA nares negative  - CXR (9/21) largely unremarkable, again re-demonstrated perihilar prominence seen  on prior XR  - Abdominal xray (9/21) unremarkable  - Repeat blood cultures x 2 (9/21) NGTD  - UA (9/21) with 500 LE  - UA (9/30) +bacteria, +leuks, urine culture (9/29) mult organisms  - UA sent 10/3 with +blood, leuks, and WBC- not reflex to cx so UCX added on to 10/3 UA and again showed multiple organisms  - Will hold off tx for UTI as pt asymptomatic and leukocytosis improved. 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  # ACS  - Previously managed through routine RBC exchanges q6 weeks, most recent RBCEx 3/1/24, per patient pausing on transfusions for time being  - OARRS reviewed, no aberrant behavior noted  - LDH>12,000 (now downtrending), haptoglobin <30, fibrinogen 318, retic % 5.2 on presentation, bili 10.7  - Leukocytosis 24.8 on admission to MICU  - CXR (9/10) persistent atelectasis/scarring in the left lower lung  - CT CAP (9/11) Diffuse mosaic attenuation of the lung parenchyma, which can be seen in the setting of small airway disease, pulmonary edema or acute infection  - Intubated upon arrival to MICU, now s/p extubation 9/16  - s/p Vanc and Zosyn (finished both courses prior to floor transfer)  - Procal elevated 7.09 (9/11)  - Blood cultures 9/10, 9/12, 9/15 all NGTD  - Strep and Legionella Ag, MRSA nares negative  - Trop peak 1431, cards rec treat as ACS (see below)  - S/p 2u pRBCs 9/10 on arrival to MICU  - S/p exchange transfusion 9/11 AM for ACS  - S/p routine exchange transfusion 10/8 per Dr. Whaley   - Care plan from 12/6/23- For mild to moderate pain: Dilaudid 0.5mg IVP q3h as needed, for moderate to severe pain Dilaudid 1- 1.5mg q3h PRN  - On arrival to [facility], started on 0.6mg dilaudid IVP q3h PRN severe pain   - s/p 0.6mg dilaudid IVP q4h PRN breakthrough and 15mg PO oxycodone Q4 hrs severe pain (9/25)  - Per discussion with Dr. Whaley (9/25), okay to increase PO oxycodone to 15-20mg Q4hr to optimize pain for discharge to SNF.   - On 9/26, discontinued IV dilaudid and  increased to 20mg PO oxycodone q4hrs for severe pain   - Opioids on hold as of 9/28 d/t AMS- resumed 2.5mg oxy q4hrs PRN severe pain on 10/2. ER oxy 10mg BID still held  - (10/5- current) increased pain reg as AMS resolved--> continue oxycodone 10mg q3hrs PRN severe pain  - Continue to hold ER Oxycontin indefinitely as was major contributor to AMS   - Bowel regimen for opioid induced constipation with Senna 2tabs BID, Miralax BID, and dulcolax suppository 10mg PRN constipation  - Zofran for opioid induced nausea, no benadryl ordered (assumed from previous AMS but also no reports of opioid-induced pruritus)  - Continue home Folic Acid 1mg daily, continue thiamine 100mg daily     # ST depression with tropinemia, suspected 2/2 demand ischemia iso ACS  # Hx SVT  - Elevated troponins on admission, peak 1431 and now downtrending   - EKG with ST depressions, likely Type II MI due to vaso-occlusive crisis and anemia  - TTE 9/11: EF 60-65%, RV enlarged and reduced in function which appears stable when compared to last TTE (1/2024)  - Troponin leak is likely due to cardiac demand vs supply mismatch in the setting of worsening anemia and vaso-occlusive crisis  - Cardiology consulted, now signed off with indication to treat troponemia as ACS  - Lasix held in MICU 2/2 hypotension and CHARLES, can resume as able   - Metop 12.5mg BID held in MICU 2/2 hypotension- BP and HR continue to remain stable, holding off on restarting for now  - s/p 1x dose of po lasix 20mg on 10/9 and s/p IVP 20mg lasix on 10/10 for volume overload, plan to resume home dose lasix once LE edema back to patient's baseline   - home dose of laix of 20mg po every other day resumed on 10/12  - 10/14 s/p 1x dose of IVP lasix for increased LE edema   - 10/15 s/p 1x dose of 40mg IVP lasix for increased LE edema   - Follow-up with Cardiology outpt, FUV 2/13/25     # Acute liver injury, improving  # Direct hyperbilirubinuria, improving  - Suspected 2/2 hemodynamic  instability versus sickle cell crisis  - [facility], T bili 10.2, INR 3.0; continues to improve  - CT with hepatic venous congestion, patient lacks gallbladder  - Liver US with Doppler: Diffuse fatty infiltration, unremarkable doppler interrogation of the liver  - EBV IgG positive, IgM negative  - CMV IgG positive, PCR negative  - Acute hepatitis panel unremarkable  - Hepatology initially following, now signed off  - Will continue to monitor, trend daily CMP     # pHTN   - Initial c/f CTEPH as imaging findings with dilated PA, filling defects, and mosaicism  - VQ scan (6/13/23) with non anatomical basal defects and RUL defect due to scar--> not favoring CTEPH per Dr. Yi and Dr. 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 On lifelong anticoagulation, DOAC discouraged due to high risk of clotting   - Home Coumadin 5mg daily initially HELD on admission to MICU 2/2 unstable course  - Restarted coumadin 9/18 PM, will plan to bridge with bival, Ok'd by heme   - 9/20 INR 3.8, bival and home warfarin held. Pharmacy rec repeat INR 4 hours after bival was held. If the INR < 2, we can restart the bival, but if the INR 2-3, we can continue with warfarin monotherapy   - 9/20 repeat INR 3.7, pharmacy rec continuing to hold both medications  - 9/21 INR  3.2, per pharmacy - get repeat INR in AM 9/22, if INR between 2-3 can restart home warfarin 5mg daily  -INR 2.6 (9/24) after restarting Warfarin 5mg, will maintain INR goal of 2-3  - INR 3.4 (9/25). Held warfarin on 9/25. Repeat INR 9/26. Plan to restart if <3   - INR 2.8 (9/26), restarted warfarin, recheck INR 9/27   - INR 4.0 (9/30), held warfarin, repeat INR 10/1 ordered, plan to restart if <3  - INR 6.8 (10/1), patient without any active bleeding, per attending and pharmacy, warfarin held   - INR 6.8 (10/2), patient without any active bleeding, per attending and pharmacy, warfarin held   - INR 6.7 (10/3), cont to hold warfarin. Ordered 5mg IV Vit K x1 (ok per pharmacy) d/t ?bleeding, vaginal vs wound excoriation and UA +blood  - 10/4 INR normalized to 1.6, continue to hold warfarin  - 10/5 pt c/o severe LLE pain and swelling; duplex BLE negative  - 10/5 INR 1.2; started heparin drip given normal INR while pending apheresis line placement (previous HIT workup with negative PF4)--> held 10/7 at 0400 for apheresis line placement  - 10/7 INR 1.2--> started bridge back to coumadin tonight with lovenox. Started after apheresis line placement will start coumadin 5mg and start therapeutic lovenox 120mg BID x5 days AND until INR therepeutic x24 hours (regimen discussed with pharmacy)  - 10/10 INR 1.6   - Home coumadin 5mg daily dose resumed 10/12  - 10/13 INR 1.9   - 10/14 and 10/15 therapeutic at 2.1   - Follows with Coumadin clinic outpatient  - Plan to consult vascular medicine if INR is not therapeutic on home regimen    # CAN  - cont home CPAP   - cont home Albuterol PRN     # H/O Cauda Equine syndrome  - Follows Dr. Delacruz as outpatient  - no current issues      # DISPO  - Full code- confirmed on admission  - NOK: Tati Salgado (mother) (#[phone])  - PT/OT rec SNF, plan [facility] rehab at [facility]  - DC to SNF pending pre-cert approval  - Derm FUV 11/7, Sickle Cell FUV 11/18, Cardiology FUV 2/13    I spent >60  minutes in the professional and overall care of this patient.    Assessment and plan discussed with attending physician Dr. Shellie Kerns, APRN-CNP

## 2024-10-15 NOTE — CARE PLAN
The patient's goals for the shift include      The clinical goals for the shift include pt will remain free of injury    Problem: Pain - Adult  Goal: Verbalizes/displays adequate comfort level or baseline comfort level  10/15/2024 1927 by Kamini Rhodes RN  Outcome: Progressing  10/15/2024 0736 by Kamini Rhodes RN  Outcome: Progressing     Problem: Safety - Adult  Goal: Free from fall injury  10/15/2024 1927 by Kamini Rhodes RN  Outcome: Progressing  10/15/2024 0736 by Kamini Rhodes RN  Outcome: Progressing     Problem: Skin  Goal: Decreased wound size/increased tissue granulation at next dressing change  10/15/2024 1927 by Kamini Rhodes RN  Outcome: Progressing  Flowsheets (Taken 10/15/2024 1927)  Decreased wound size/increased tissue granulation at next dressing change: Promote sleep for wound healing  10/15/2024 0736 by Kamini Rhodes RN  Outcome: Progressing  Flowsheets (Taken 10/15/2024 0736)  Decreased wound size/increased tissue granulation at next dressing change: Promote sleep for wound healing  Goal: Participates in plan/prevention/treatment measures  10/15/2024 1927 by Kamini Rhodes RN  Outcome: Progressing  10/15/2024 0736 by Kamini Rhodes RN  Outcome: Progressing  Goal: Prevent/manage excess moisture  10/15/2024 1927 by Kamini Rhodes RN  Outcome: Progressing  10/15/2024 0736 by Kamini Rhodes RN  Outcome: Progressing  Goal: Prevent/minimize sheer/friction injuries  10/15/2024 1927 by Kamini Rhodes RN  Outcome: Progressing  10/15/2024 0736 by Kamini Rhodes RN  Outcome: Progressing  Goal: Promote/optimize nutrition  10/15/2024 1927 by Kamini Rhodes RN  Outcome: Progressing  10/15/2024 0736 by Kamini Rhodes RN  Outcome: Progressing  Goal: Promote skin healing  10/15/2024 1927 by Kamini Rhodes RN  Outcome: Progressing  10/15/2024 0736 by Kamini Rhodes RN  Outcome: Progressing     Problem: Knowledge Deficit  Goal: Patient/family/caregiver demonstrates understanding of disease process, treatment  plan, medications, and discharge instructions  10/15/2024 1927 by Kamini Rhodes RN  Outcome: Progressing  10/15/2024 0736 by Kamini Rhodes RN  Outcome: Progressing     Problem: Mechanical Ventilation  Goal: Patient Will Maintain Patent Airway  10/15/2024 1927 by Kamini Rhodes RN  Outcome: Progressing  10/15/2024 0736 by Kamini Rhodes RN  Outcome: Progressing  Goal: Oral health is maintained or improved  10/15/2024 1927 by Kamini Rhodes RN  Outcome: Progressing  10/15/2024 0736 by Kamini Rhodes RN  Outcome: Progressing  Goal: Tracheostomy will be managed safely  10/15/2024 1927 by Kamini Rhodes RN  Outcome: Progressing  10/15/2024 0736 by Kamini Rhodes RN  Outcome: Progressing  Goal: ET tube will be managed safely  10/15/2024 1927 by Kamini Rhodes RN  Outcome: Progressing  10/15/2024 0736 by Kamini Rhodes RN  Outcome: Progressing  Goal: Ability to express needs and understand communication  10/15/2024 1927 by Kamini Rhodes RN  Outcome: Progressing  10/15/2024 0736 by Kamini Rhodes RN  Outcome: Progressing  Goal: Mobility/activity is maintained at optimum level for patient  10/15/2024 1927 by Kamini Rhodes RN  Outcome: Progressing  10/15/2024 0736 by Kamini Rhodes RN  Outcome: Progressing     Problem: Pain  Goal: Takes deep breaths with improved pain control throughout the shift  10/15/2024 1927 by Kamini Rhodes RN  Outcome: Progressing  10/15/2024 0736 by Kamini Rhodes RN  Outcome: Progressing  Goal: Turns in bed with improved pain control throughout the shift  10/15/2024 1927 by Kamini Rhodes RN  Outcome: Progressing  10/15/2024 0736 by Kamini Rhodes RN  Outcome: Progressing  Goal: Walks with improved pain control throughout the shift  10/15/2024 1927 by Kamini Rhodes RN  Outcome: Progressing  10/15/2024 0736 by Kamini Rhodes RN  Outcome: Progressing  Goal: Performs ADL's with improved pain control throughout shift  10/15/2024 1927 by Kamini Rhodes RN  Outcome: Progressing  10/15/2024 0736 by  Kamini Rhodes RN  Outcome: Progressing  Goal: Participates in PT with improved pain control throughout the shift  10/15/2024 1927 by Kamini Rhodes RN  Outcome: Progressing  10/15/2024 0736 by Kamini Rhodes RN  Outcome: Progressing  Goal: Free from opioid side effects throughout the shift  10/15/2024 1927 by Kamini Rhodes RN  Outcome: Progressing  10/15/2024 0736 by Kamini Rhodes RN  Outcome: Progressing  Goal: Free from acute confusion related to pain meds throughout the shift  10/15/2024 1927 by Kamini Rhodes RN  Outcome: Progressing  10/15/2024 0736 by Kamini Rhodes RN  Outcome: Progressing     Problem: Nutrition  Goal: Less than 5 days NPO/clear liquids  10/15/2024 1927 by Kamini Rhodes RN  Outcome: Progressing  10/15/2024 0736 by Kamini Rhodes RN  Outcome: Progressing  Goal: Oral intake greater than 50%  10/15/2024 1927 by Kamini Rhodes RN  Outcome: Progressing  10/15/2024 0736 by Kamini Rhodes RN  Outcome: Progressing  Goal: Oral intake greater 75%  10/15/2024 1927 by Kamini Rhodes RN  Outcome: Progressing  10/15/2024 0736 by Kamini Rhodes RN  Outcome: Progressing  Goal: Consume prescribed supplement  10/15/2024 1927 by Kamini Rhodes RN  Outcome: Progressing  10/15/2024 0736 by Kamini Rhodes RN  Outcome: Progressing  Goal: Adequate PO fluid intake  10/15/2024 1927 by Kamini Rhodes RN  Outcome: Progressing  10/15/2024 0736 by Kamini Rhodes RN  Outcome: Progressing  Goal: Nutrition support goals are met within 48 hrs  10/15/2024 1927 by Kamini Rhodes RN  Outcome: Progressing  10/15/2024 0736 by Kamini Rhodes RN  Outcome: Progressing  Goal: Nutrition support is meeting 75% of nutrient needs  10/15/2024 1927 by Kamini Rhodes RN  Outcome: Progressing  10/15/2024 0736 by Kamini Rhodes RN  Outcome: Progressing  Goal: Tube feed tolerance  10/15/2024 1927 by Kamini Rhodes RN  Outcome: Progressing  10/15/2024 0736 by Kamini Rhodes RN  Outcome: Progressing  Goal: BG  mg/dL  10/15/2024 1927 by  Kamini Rhodes RN  Outcome: Progressing  10/15/2024 0736 by Kamini Rhodes RN  Outcome: Progressing  Goal: Lab values WNL  10/15/2024 1927 by Kamini Rhodes RN  Outcome: Progressing  10/15/2024 0736 by Kamini Rhodes RN  Outcome: Progressing  Goal: Electrolytes WNL  10/15/2024 1927 by Kamini Rhodes RN  Outcome: Progressing  10/15/2024 0736 by Kamini Rhodes RN  Outcome: Progressing  Goal: Promote healing  10/15/2024 1927 by Kamini Rhodes RN  Outcome: Progressing  10/15/2024 0736 by Kamini Rhodes RN  Outcome: Progressing  Goal: Maintain stable weight  10/15/2024 1927 by Kamini Rhodes RN  Outcome: Progressing  10/15/2024 0736 by Kamini Rhodes RN  Outcome: Progressing  Goal: Reduce weight from edema/fluid  10/15/2024 1927 by Kamini Rhodes RN  Outcome: Progressing  10/15/2024 0736 by Kamini Rhodes RN  Outcome: Progressing  Goal: Gradual weight gain  10/15/2024 1927 by Kamini Rhodes RN  Outcome: Progressing  10/15/2024 0736 by Kamini Rhodes RN  Outcome: Progressing  Goal: Improve ostomy output  10/15/2024 1927 by Kamini Rhodes RN  Outcome: Progressing  10/15/2024 0736 by Kamini Rhodes RN  Outcome: Progressing     Problem: Fall/Injury  Goal: Not fall by end of shift  10/15/2024 1927 by Kamini Rhodes RN  Outcome: Progressing  10/15/2024 0736 by Kamini Rhodes RN  Outcome: Progressing  Goal: Be free from injury by end of the shift  10/15/2024 1927 by Kamini Rhodes RN  Outcome: Progressing  10/15/2024 0736 by Kamini Rhodes RN  Outcome: Progressing  Goal: Verbalize understanding of personal risk factors for fall in the hospital  10/15/2024 1927 by Kamini Rhodes RN  Outcome: Progressing  10/15/2024 0736 by Kamini Rhodes RN  Outcome: Progressing  Goal: Verbalize understanding of risk factor reduction measures to prevent injury from fall in the home  10/15/2024 1927 by Kamini Rhodes RN  Outcome: Progressing  10/15/2024 0736 by Kamini Rhodes RN  Outcome: Progressing  Goal: Use assistive devices by end of the  shift  10/15/2024 1927 by Kamini Rhodes RN  Outcome: Progressing  10/15/2024 0736 by Kamini Rhodes RN  Outcome: Progressing  Goal: Pace activities to prevent fatigue by end of the shift  10/15/2024 1927 by Kamini Rhodes RN  Outcome: Progressing  10/15/2024 0736 by Kamini Rhodes RN  Outcome: Progressing

## 2024-10-15 NOTE — PROGRESS NOTES
"Spiritual Care Visit    Clinical Encounter Type  Visited With: Patient  Routine Visit: Follow-up  Continue Visiting: Yes    Taxonomy  Intended Effects: Aligning care plan with patient's values, Build relationship of care and support, Demonstrate caring and concern, Establish rapport and connectedness  Methods: Encourage self reflection, Encourage sharing of feelings, Offer emotional support, Offer spiritual/Rastafari support  Interventions: Acknowledge current situation, Acknowledge response to difficult experience, Active listening, Discuss concerns, Discuss coping mechanisms with someone, [place], Provide spiritual/Rastafari resources     reintroduced self and role to patient Senait Narvaez. Patient shared that she has been feeling down this admission, sharing that she usually recovers more quickly from pain crises. She shared that she is having difficulties mentally due to not getting sleep and feeling discouraged, not knowing the reason for her physical symptoms and experiencing pain. Patient shared that she has coped with the support of her mother and daughter, as well as through prayer.    Patient spoke of her granddaughter with areli, speaking of the love she has for her and how proud she is of her. Patient shared that she loves cooking and that is one of the things she shares with her granddaughter.      offered a journal and worry stone, which patient accepted. She selected a stone that says \"First things first,\" and expressed \"My health comes first.\"     provided care through reflective listening, validation of feelings, supportive conversation, and prayer. Patient was appreciative and did not have any further needs at this time. Spiritual Care remains available as needed/requested.    Rev. Anita Pandya MDiv, [facility]    "

## 2024-10-15 NOTE — PROGRESS NOTES
Music Therapy Note    Senait Narvaez     Therapy Session  Referral Type: New referral this admission  Visit Type: Follow-up visit  Session Start Time: 1311  Session End Time: 1317  Intervention Delivery: In-person  Conflict of Service: None               Treatment/Interventions       Post-assessment  Total Session Time (min): 6 minutes    Narrative  Assessment Detail: Patient presented awake and alert, supine in bed with HOB raised, displaying calm affect. Patient engaged in conversation with MT, sharing that she was planning to discharge next week and did not endorse needs this day.  Follow-up: MT to follow as needed.    Education Documentation  No documentation found.

## 2024-10-15 NOTE — CARE PLAN
The patient's goals for the shift include      The clinical goals for the shift include pt will remain HDS    Problem: Pain - Adult  Goal: Verbalizes/displays adequate comfort level or baseline comfort level  Outcome: Progressing     Problem: Safety - Adult  Goal: Free from fall injury  Outcome: Progressing     Problem: Skin  Goal: Decreased wound size/increased tissue granulation at next dressing change  Outcome: Progressing  Flowsheets (Taken 10/15/2024 0736)  Decreased wound size/increased tissue granulation at next dressing change: Promote sleep for wound healing  Goal: Participates in plan/prevention/treatment measures  Outcome: Progressing  Goal: Prevent/manage excess moisture  Outcome: Progressing  Goal: Prevent/minimize sheer/friction injuries  Outcome: Progressing  Goal: Promote/optimize nutrition  Outcome: Progressing  Goal: Promote skin healing  Outcome: Progressing     Problem: Knowledge Deficit  Goal: Patient/family/caregiver demonstrates understanding of disease process, treatment plan, medications, and discharge instructions  Outcome: Progressing     Problem: Mechanical Ventilation  Goal: Patient Will Maintain Patent Airway  Outcome: Progressing  Goal: Oral health is maintained or improved  Outcome: Progressing  Goal: Tracheostomy will be managed safely  Outcome: Progressing  Goal: ET tube will be managed safely  Outcome: Progressing  Goal: Ability to express needs and understand communication  Outcome: Progressing  Goal: Mobility/activity is maintained at optimum level for patient  Outcome: Progressing     Problem: Pain  Goal: Takes deep breaths with improved pain control throughout the shift  Outcome: Progressing  Goal: Turns in bed with improved pain control throughout the shift  Outcome: Progressing  Goal: Walks with improved pain control throughout the shift  Outcome: Progressing  Goal: Performs ADL's with improved pain control throughout shift  Outcome: Progressing  Goal: Participates in PT with  improved pain control throughout the shift  Outcome: Progressing  Goal: Free from opioid side effects throughout the shift  Outcome: Progressing  Goal: Free from acute confusion related to pain meds throughout the shift  Outcome: Progressing     Problem: Nutrition  Goal: Less than 5 days NPO/clear liquids  Outcome: Progressing  Goal: Oral intake greater than 50%  Outcome: Progressing  Goal: Oral intake greater 75%  Outcome: Progressing  Goal: Consume prescribed supplement  Outcome: Progressing  Goal: Adequate PO fluid intake  Outcome: Progressing  Goal: Nutrition support goals are met within 48 hrs  Outcome: Progressing  Goal: Nutrition support is meeting 75% of nutrient needs  Outcome: Progressing  Goal: Tube feed tolerance  Outcome: Progressing  Goal: BG  mg/dL  Outcome: Progressing  Goal: Lab values WNL  Outcome: Progressing  Goal: Electrolytes WNL  Outcome: Progressing  Goal: Promote healing  Outcome: Progressing  Goal: Maintain stable weight  Outcome: Progressing  Goal: Reduce weight from edema/fluid  Outcome: Progressing  Goal: Gradual weight gain  Outcome: Progressing  Goal: Improve ostomy output  Outcome: Progressing     Problem: Fall/Injury  Goal: Not fall by end of shift  Outcome: Progressing  Goal: Be free from injury by end of the shift  Outcome: Progressing  Goal: Verbalize understanding of personal risk factors for fall in the hospital  Outcome: Progressing  Goal: Verbalize understanding of risk factor reduction measures to prevent injury from fall in the home  Outcome: Progressing  Goal: Use assistive devices by end of the shift  Outcome: Progressing  Goal: Pace activities to prevent fatigue by end of the shift  Outcome: Progressing

## 2024-10-15 NOTE — ASSESSMENT & PLAN NOTE
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 Patient was subsequently intubated due to worsening lethargic and lack of respiratory compensation from significant metabolic acidosis with concern for ACS. Vancomycin and Zosyn started for empiric coverage, patient also started on pressors for BP support. Patient was transfused 2 units pRBCs and then underwent RBCEx on 9/11. Cardiology consulted for c/f NSTEMI vs demand ischemia (ST depressions and elevated troponin), rec treat as ACS. Course further c/b severe CHARLES with peak sCr of 5.4, and acute liver injury with severely elevated liver enzymes (ALT 4119, AST >10k). Liver US only remarkable for fatty infiltration, viral hepatitis panel negative. CHARLES and liver injury improved with supportive management. Pruritic rash noted 9/14, Derm consulted and took multiple biopsies 9/15. Rash then began to worsen to involve the groin, lateral thigh, and scalp with numerous tense bullae. Discontinued heparin given concerns for potential HIT, started bivalirudin. However PF4 was negative. Patient was extubated on 9/16/2024. Started stress dose steroids given continued pressor requirements, pressors Dc'd 9/17.      Patient transferred to [facility] 9/17, PT/OT rec SNF. Coumadin bridge was started 9/18. Given persistent rash with unremarkable labs, derm re-biopsied on 9/18, findings ultimately felt to be most c/w fixed drug eruption. Dermatology contacted for bx results (9/20) rec triamcinolone cream BID and interdry cloths. Leukocytosis uptrending 9/21, repeat infectious workup negative (CXR, Abd XR, blood  cultures, MRSA negative 9/21). UA with leuks. On 9/25, per nursing and patient, rash seems to be worsening. Derm re-engaged recommended to apply vasoline to all eroded/denuded areas and continue triamcinolone cream to both the black plaques as well as the red areas across the trunk and thighs. 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 Wound care following. For warfarin dosing, per pharmacy, if INR between 2-3 can restart home warfarin 5mg daily, if INR < 2 restart bival. INR 3.4 on 9/25, warfarin stopped, repeat INR 2.8 9/26 and warfarin restarted. On 9/26, increased PO oxycodone and discontinued IV dilaudid in anticipation for discharge. Overnight on 9/29, brain attack called out of concern for change in mental status, no focal deficits noted, BAT cancelled and narcan administered x3 with improvement of mental status. Mentation improved as of 10/2, small dose oxy resumed. INR supratherapeutic 10/3 at 6.7, 5mg IV Vit K x1 given (ok per pharmacy) I/s/o ?bleeding from vagina vs wound excoriation and UA with +blood. INR  down to 1.5 (10/4). Plan for RBCEx prior to dc per Dr. Whaley, planned 10/8. 10/5 started heparin drip (per attending) given normal INR while pending apheresis line placement (previous HIT workup with negative PF4). 10/7 started bridge back to coumadin with lovenox after apheresis line. Continue therapeutic Lovenox 120mg BID with Coumadin 5mg nightly x5 days and until INR is in therapeutic range x24 hours. 10/5 ordered duplex BLE as pt c/o severe LLE pain and swelling which was negative. Left ankle x-ray (10/8) negative for acute fractures, showed soft tissue edema. CT of left ankle (10/13) with diffuse nonspecific edema without fluid collection, may represent vascular congestion, no acute osseous abnormalities, no AVN. Uric acid elevated to 7.9, started loading dose of colchicine 10/15 with plan for  0.6mg colchicine BID until 48 hours after gout flare. 10/15, noted hardening of left breast and skin changes, after discussion with attending and Dr. Whaley US Bl breasts ordered. DC to SNF pending precert approval with home coumadin dose.     # Left LE pain   # gout   - 10/5 ordered duplex BLE as pt c/o severe LLE pain and swelling which was negative  - Left ankle x-ray (10/8) negative for acute fractures, showed soft tissue edema  - CT of left ankle (10/13) with diffuse nonspecific edema without fluid collection, may represent vascular congestion, no acute osseous abnormalities, no AVN  - Uric acid elevated to 7.9 (10/15)   - started 1.2 mg loading dose of colchicine 10/15 with plan for 0.6mg colchicine BID until 48 hours after gout flare     # Left breast skin changes   - Noted hardening and skin changes to left breast on 10/15 around site of previous infection. Non-erythematous, no purulence (see below)   - After discussion with Dr. Whaley on 10/15, he recommended obtaining US   - BL breast US complete pending     # Waxing and waning AMS---Resolved   - Felt to be due to opioids i/s/o worsening renal function and c/b respiratory acidosis  - All opioids on hold (9/28-10/2)  - 10/2 resumed oxycodone 10mg q3 hours as AMS now resolved   - continue to hold extended release oxycontin   - Keep supplemental O2 on board, but titrate to maintain SPO2 of 90-92%   - On CPAP at home, enforce nightly use to extent possible; may need BiPAP if unresolved    # CHARLES on CKD II, resolved   - Baseline ~ SCr <2, peak 5.4 this admission. 9/21 sCr 1.58 --> 1.13 (9/25)--> 1.14 (10/3)--> 0.96 (10/6) -->  1.01 (10/14) --> 0.99 (10/15)  - Likely pre-renal vs ATN given persistant hypotension over admission, on CKD II, now polyuric phase  - FeNA 0.4%  - Severe hyperkalemia with previous peaked T waves on EKG, resolved  - Nephrology following, now signed off   - s/p multiple K cocktails in MICU  - Encourage increased PO intake     # Purpuric rash,  suspected fixed drug eruption  # Concern for possible heparin-induced thrombocytopenia (HIT) -resolved  - Purple, reticular pattern of plaques noted around the bilateral breasts, scalp and groin,  with tense bullae. Non-erythematous, no purulence  - Some initial concern for possible heparin-induced thrombocytopenia with concurrent skin findings. Discontinued heparin 9/16 and transitioned to bivalirudin  - 4 T score 0, PF4 w/ reflex BINA negative. Hematology ultimately consulted, felt there was low concern for HIT and signed off  - Differential diagnosis includes purpura secondary to infection versus coagulopathy versus vasculopathy versus small and/or medium vessel vasculitis versus calciphylaxis vs drug rash  - Dermatology consulted, work-up to date:  - Low Protein C activity  - ANCA, PR3 and MPO negative  - ISABELLE neg  - cryoglobulin labs- not enough specimen to analyze  - S/p skin punch biopsies x 2 9/15 and one on 9/18, showed SUPERFICIAL EPIDERMAL DEGENERATION WITH ERYTHROCYTE EXTRAVASATION WITH NEUTROPHILS. These findings could be seen secondary to an older trauma, or resolving erythema multiforme, a fixed drug eruption, Edgar-Christopher syndrome, or toxic epidermal necrolysis. Favor Multifocal Fixed Drug Eruption    - Dermatology contacted (9/20) about results, recommended adding vancomycin to allergy list and starting triamcinolone  - On 9/25, concern from pt and nursing that rash is not improving. Under breasts and groin still causing pain. Bilateral upper thighs and forehead rash improving   - Wound care re-consulted 9/25   - Re-engaged dermatology on 9/25 given worsening rash; recommended to apply vasoline to all eroded/denuded areas and continue triamcinolone cream to both the black plaques as well as the red areas across the trunk and thighs. 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     # Leukocytosis--  resolved   - Likely I/s/o rash vs reactive vs infectious process  - Leukocytosis noted on admission to MICU (WBC 24.8) --> 12.7 (9/25) --> 8.3 (10/3) --> as of 10/14, leukocytosis continues to be resolved   - Blood cultures 9/10, 9/12, 9/15 all NGTD  - Strep and Legionella Ag, MRSA nares negative  - CXR (9/21) largely unremarkable, again re-demonstrated perihilar prominence seen on prior XR  - Abdominal xray (9/21) unremarkable  - Repeat blood cultures x 2 (9/21) NGTD  - UA (9/21) with 500 LE  - UA (9/30) +bacteria, +leuks, urine culture (9/29) mult organisms  - UA sent 10/3 with +blood, leuks, and WBC- not reflex to cx so UCX added on to 10/3 UA and again showed multiple organisms  - Will hold off tx for UTI as pt asymptomatic and leukocytosis improved. 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  # ACS  - Previously managed through routine RBC exchanges q6 weeks, most recent RBCEx 3/1/24, per patient pausing on transfusions for time being  - OARRS reviewed, no aberrant behavior noted  - LDH>12,000 (now downtrending), haptoglobin <30, fibrinogen 318, retic % 5.2 on presentation, bili 10.7  - Leukocytosis 24.8 on admission to MICU  - CXR (9/10) persistent atelectasis/scarring in the left lower lung  - CT CAP (9/11) Diffuse mosaic attenuation of the lung parenchyma, which can be seen in the setting of small airway disease, pulmonary edema or acute infection  - Intubated upon arrival to MICU, now s/p extubation 9/16  - s/p Vanc and Zosyn (finished both courses prior to floor transfer)  - Procal elevated 7.09 (9/11)  - Blood cultures 9/10, 9/12, 9/15 all NGTD  - Strep and Legionella Ag, MRSA nares negative  - Trop peak 1431, cards rec treat as ACS (see below)  - S/p 2u pRBCs 9/10 on arrival to MICU  - S/p exchange transfusion 9/11 AM for ACS  - S/p routine exchange transfusion 10/8 per Dr. Whaley   - Care plan from 12/6/23- For mild to moderate pain: Dilaudid 0.5mg IVP q3h as needed, for moderate  to severe pain Dilaudid 1- 1.5mg q3h PRN  - [facility] to [facility], started on 0.6mg dilaudid IVP q3h PRN severe pain   - s/p 0.6mg dilaudid IVP q4h PRN breakthrough and 15mg PO oxycodone Q4 hrs severe pain (9/25)  - Per discussion with Dr. Whaley (9/25), okay to increase PO oxycodone to 15-20mg Q4hr to optimize pain for discharge to SNF.   - On 9/26, discontinued IV dilaudid and increased to 20mg PO oxycodone q4hrs for severe pain   - Opioids on hold as of 9/28 d/t AMS- resumed 2.5mg oxy q4hrs PRN severe pain on 10/2. ER oxy 10mg BID still held  - (10/5- current) increased pain reg as AMS resolved--> continue oxycodone 10mg q3hrs PRN severe pain  - Continue to hold ER Oxycontin indefinitely as was major contributor to AMS   - Bowel regimen for opioid induced constipation with Senna 2tabs BID, Miralax BID, and dulcolax suppository 10mg PRN constipation  - Zofran for opioid induced nausea, no benadryl ordered (assumed from previous AMS but also no reports of opioid-induced pruritus)  - Continue home Folic Acid 1mg daily, continue thiamine 100mg daily     # ST depression with tropinemia, suspected 2/2 demand ischemia iso ACS  # Hx SVT  - Elevated troponins on admission, peak 1431 and now downtrending   - EKG with ST depressions, likely Type II MI due to vaso-occlusive crisis and anemia  - TTE 9/11: EF 60-65%, RV enlarged and reduced in function which appears stable when compared to last TTE (1/2024)  - Troponin leak is likely due to cardiac demand vs supply mismatch in the setting of worsening anemia and vaso-occlusive crisis  - Cardiology consulted, now signed off with indication to treat troponemia as ACS  - Lasix held in MICU 2/2 hypotension and CHARLES, can resume as able   - Metop 12.5mg BID held in MICU 2/2 hypotension- BP and HR continue to remain stable, holding off on restarting for now  - s/p 1x dose of po lasix 20mg on 10/9 and s/p IVP 20mg lasix on 10/10 for volume overload, plan to resume home dose lasix once LE  edema back to patient's baseline   - home dose of laix of 20mg po every other day resumed on 10/12  - 10/14 s/p 1x dose of IVP lasix for increased LE edema   - 10/15 s/p 1x dose of 40mg IVP lasix for increased LE edema   - Follow-up with Cardiology outpt, FUV 2/13/25     # Acute liver injury, improving  # Direct hyperbilirubinuria, improving  - Suspected 2/2 hemodynamic instability versus sickle cell crisis  [facility] 10.2, INR 3.0; continues to improve  - CT with hepatic venous congestion, patient lacks gallbladder  - Liver US with Doppler: Diffuse fatty infiltration, unremarkable doppler interrogation of the liver  - EBV IgG positive, IgM negative  - CMV IgG positive, PCR negative  - Acute hepatitis panel unremarkable  - Hepatology initially following, now signed off  - Will continue to monitor, trend daily CMP     # pHTN   - Initial c/f CTEPH as imaging findings with dilated PA, filling defects, and mosaicism  - VQ scan (6/13/23) with non anatomical basal defects and RUL defect due to scar--> not favoring CTEPH per Dr. Yi and Dr. 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 On lifelong anticoagulation, DOAC discouraged due to high risk of clotting   - Home Coumadin 5mg daily initially HELD on admission to MICU 2/2 unstable  course  - Restarted coumadin 9/18 PM, will plan to bridge with bival, Ok'd by heme   - 9/20 INR 3.8, bival and home warfarin held. Pharmacy rec repeat INR 4 hours after bival was held. If the INR < 2, we can restart the bival, but if the INR 2-3, we can continue with warfarin monotherapy   - 9/20 repeat INR 3.7, pharmacy rec continuing to hold both medications  - 9/21 INR 3.2, per pharmacy - get repeat INR in AM 9/22, if INR between 2-3 can restart home warfarin 5mg daily  -INR 2.6 (9/24) after restarting Warfarin 5mg, will maintain INR goal of 2-3  - INR 3.4 (9/25). Held warfarin on 9/25. Repeat INR 9/26. Plan to restart if <3   - INR 2.8 (9/26), restarted warfarin, recheck INR 9/27   - INR 4.0 (9/30), held warfarin, repeat INR 10/1 ordered, plan to restart if <3  - INR 6.8 (10/1), patient without any active bleeding, per attending and pharmacy, warfarin held   - INR 6.8 (10/2), patient without any active bleeding, per attending and pharmacy, warfarin held   - INR 6.7 (10/3), cont to hold warfarin. Ordered 5mg IV Vit K x1 (ok per pharmacy) d/t ?bleeding, vaginal vs wound excoriation and UA +blood  - 10/4 INR normalized to 1.6, continue to hold warfarin  - 10/5 pt c/o severe LLE pain and swelling; duplex BLE negative  - 10/5 INR 1.2; started heparin drip given normal INR while pending apheresis line placement (previous HIT workup with negative PF4)--> held 10/7 at 0400 for apheresis line placement  - 10/7 INR 1.2--> started bridge back to coumadin tonight with lovenox. Started after apheresis line placement will start coumadin 5mg and start therapeutic lovenox 120mg BID x5 days AND until INR therepeutic x24 hours (regimen discussed with pharmacy)  - 10/10 INR 1.6   - Home coumadin 5mg daily dose resumed 10/12  - 10/13 INR 1.9   - 10/14 and 10/15 therapeutic at 2.1   - Follows with Coumadin clinic outpatient  - Plan to consult vascular medicine if INR is not therapeutic on home regimen    # CAN  - cont home CPAP   -  cont home Albuterol PRN     # H/O Cauda Equine syndrome  - Follows Dr. Delacruz as outpatient  - no current issues      # DISPO  - Full code- confirmed on admission  - NOK: Tati Salgado (mother) (#[phone])  - PT/OT rec SNF, plan [facility] rehab at [facility]  - DC to SNF pending pre-cert approval  - Derm FUV 11/7, Sickle Cell FUV 11/18, Cardiology FUV 2/13

## 2024-10-16 ENCOUNTER — APPOINTMENT (OUTPATIENT)
Dept: RADIOLOGY | Facility: HOSPITAL | Age: 54
DRG: 811 | End: 2024-10-16
Payer: COMMERCIAL

## 2024-10-16 LAB
ALBUMIN SERPL BCP-MCNC: 2.5 G/DL (ref 3.4–5)
ALP SERPL-CCNC: 159 U/L (ref 33–110)
ALT SERPL W P-5'-P-CCNC: 14 U/L (ref 7–45)
ANION GAP SERPL CALC-SCNC: 13 MMOL/L (ref 10–20)
AST SERPL W P-5'-P-CCNC: 35 U/L (ref 9–39)
BASOPHILS # BLD AUTO: 0.02 X10*3/UL (ref 0–0.1)
BASOPHILS NFR BLD AUTO: 0.2 %
BILIRUB SERPL-MCNC: 1.3 MG/DL (ref 0–1.2)
BUN SERPL-MCNC: 17 MG/DL (ref 6–23)
CALCIUM SERPL-MCNC: 9 MG/DL (ref 8.6–10.6)
CHLORIDE SERPL-SCNC: 92 MMOL/L (ref 98–107)
CO2 SERPL-SCNC: 36 MMOL/L (ref 21–32)
CREAT SERPL-MCNC: 1.07 MG/DL (ref 0.5–1.05)
EGFRCR SERPLBLD CKD-EPI 2021: 61 ML/MIN/1.73M*2
EOSINOPHIL # BLD AUTO: 0.13 X10*3/UL (ref 0–0.7)
EOSINOPHIL NFR BLD AUTO: 1.6 %
ERYTHROCYTE [DISTWIDTH] IN BLOOD BY AUTOMATED COUNT: 18 % (ref 11.5–14.5)
GLUCOSE SERPL-MCNC: 63 MG/DL (ref 74–99)
HCT VFR BLD AUTO: 30.6 % (ref 36–46)
HGB BLD-MCNC: 9.8 G/DL (ref 12–16)
HGB RETIC QN: 31 PG (ref 28–38)
IMM GRANULOCYTES # BLD AUTO: 0.03 X10*3/UL (ref 0–0.7)
IMM GRANULOCYTES NFR BLD AUTO: 0.4 % (ref 0–0.9)
IMMATURE RETIC FRACTION: 13.8 %
INR PPP: 2.5 (ref 0.9–1.1)
LDH SERPL L TO P-CCNC: 311 U/L (ref 84–246)
LYMPHOCYTES # BLD AUTO: 1.58 X10*3/UL (ref 1.2–4.8)
LYMPHOCYTES NFR BLD AUTO: 19.1 %
MAGNESIUM SERPL-MCNC: 1.75 MG/DL (ref 1.6–2.4)
MCH RBC QN AUTO: 29.4 PG (ref 26–34)
MCHC RBC AUTO-ENTMCNC: 32 G/DL (ref 32–36)
MCV RBC AUTO: 92 FL (ref 80–100)
MONOCYTES # BLD AUTO: 0.74 X10*3/UL (ref 0.1–1)
MONOCYTES NFR BLD AUTO: 8.9 %
NEUTROPHILS # BLD AUTO: 5.78 X10*3/UL (ref 1.2–7.7)
NEUTROPHILS NFR BLD AUTO: 69.8 %
NRBC BLD-RTO: 1.4 /100 WBCS (ref 0–0)
PLATELET # BLD AUTO: 297 X10*3/UL (ref 150–450)
POTASSIUM SERPL-SCNC: 4.7 MMOL/L (ref 3.5–5.3)
PROT SERPL-MCNC: 6.3 G/DL (ref 6.4–8.2)
PROTHROMBIN TIME: 28.1 SECONDS (ref 9.8–12.8)
RBC # BLD AUTO: 3.33 X10*6/UL (ref 4–5.2)
RETICS #: 0.17 X10*6/UL (ref 0.02–0.08)
RETICS/RBC NFR AUTO: 5 % (ref 0.5–2)
SODIUM SERPL-SCNC: 136 MMOL/L (ref 136–145)
WBC # BLD AUTO: 8.3 X10*3/UL (ref 4.4–11.3)

## 2024-10-16 PROCEDURE — 2500000001 HC RX 250 WO HCPCS SELF ADMINISTERED DRUGS (ALT 637 FOR MEDICARE OP)

## 2024-10-16 PROCEDURE — 99233 SBSQ HOSP IP/OBS HIGH 50: CPT

## 2024-10-16 PROCEDURE — 36415 COLL VENOUS BLD VENIPUNCTURE: CPT

## 2024-10-16 PROCEDURE — 85025 COMPLETE CBC W/AUTO DIFF WBC: CPT | Performed by: PHYSICIAN ASSISTANT

## 2024-10-16 PROCEDURE — 76642 ULTRASOUND BREAST LIMITED: CPT | Mod: LT

## 2024-10-16 PROCEDURE — 2500000001 HC RX 250 WO HCPCS SELF ADMINISTERED DRUGS (ALT 637 FOR MEDICARE OP): Performed by: NURSE PRACTITIONER

## 2024-10-16 PROCEDURE — 83735 ASSAY OF MAGNESIUM: CPT | Performed by: PHYSICIAN ASSISTANT

## 2024-10-16 PROCEDURE — 76981 USE PARENCHYMA: CPT | Mod: LT,RT

## 2024-10-16 PROCEDURE — 2500000005 HC RX 250 GENERAL PHARMACY W/O HCPCS: Performed by: NURSE PRACTITIONER

## 2024-10-16 PROCEDURE — 76642 ULTRASOUND BREAST LIMITED: CPT | Performed by: RADIOLOGY

## 2024-10-16 PROCEDURE — 82985 ASSAY OF GLYCATED PROTEIN: CPT

## 2024-10-16 PROCEDURE — 85045 AUTOMATED RETICULOCYTE COUNT: CPT

## 2024-10-16 PROCEDURE — 77062 BREAST TOMOSYNTHESIS BI: CPT | Performed by: RADIOLOGY

## 2024-10-16 PROCEDURE — 97110 THERAPEUTIC EXERCISES: CPT | Mod: GP

## 2024-10-16 PROCEDURE — 36415 COLL VENOUS BLD VENIPUNCTURE: CPT | Performed by: NURSE PRACTITIONER

## 2024-10-16 PROCEDURE — 85610 PROTHROMBIN TIME: CPT | Performed by: NURSE PRACTITIONER

## 2024-10-16 PROCEDURE — 2500000004 HC RX 250 GENERAL PHARMACY W/ HCPCS (ALT 636 FOR OP/ED)

## 2024-10-16 PROCEDURE — 1170000001 HC PRIVATE ONCOLOGY ROOM DAILY

## 2024-10-16 PROCEDURE — 2500000002 HC RX 250 W HCPCS SELF ADMINISTERED DRUGS (ALT 637 FOR MEDICARE OP, ALT 636 FOR OP/ED): Performed by: NURSE PRACTITIONER

## 2024-10-16 PROCEDURE — 83615 LACTATE (LD) (LDH) ENZYME: CPT

## 2024-10-16 PROCEDURE — 80053 COMPREHEN METABOLIC PANEL: CPT

## 2024-10-16 PROCEDURE — 77062 BREAST TOMOSYNTHESIS BI: CPT

## 2024-10-16 PROCEDURE — 77066 DX MAMMO INCL CAD BI: CPT | Performed by: RADIOLOGY

## 2024-10-16 RX ORDER — FUROSEMIDE 10 MG/ML
40 INJECTION INTRAMUSCULAR; INTRAVENOUS ONCE
Status: COMPLETED | OUTPATIENT
Start: 2024-10-16 | End: 2024-10-16

## 2024-10-16 RX ORDER — INDOMETHACIN 75 MG/1
75 CAPSULE, EXTENDED RELEASE ORAL
Status: DISCONTINUED | OUTPATIENT
Start: 2024-10-16 | End: 2024-10-21 | Stop reason: HOSPADM

## 2024-10-16 ASSESSMENT — COGNITIVE AND FUNCTIONAL STATUS - GENERAL
TURNING FROM BACK TO SIDE WHILE IN FLAT BAD: A LITTLE
DAILY ACTIVITIY SCORE: 20
CLIMB 3 TO 5 STEPS WITH RAILING: A LOT
WALKING IN HOSPITAL ROOM: TOTAL
STANDING UP FROM CHAIR USING ARMS: A LOT
TURNING FROM BACK TO SIDE WHILE IN FLAT BAD: A LITTLE
MOVING FROM LYING ON BACK TO SITTING ON SIDE OF FLAT BED WITH BEDRAILS: A LITTLE
MOBILITY SCORE: 12
WALKING IN HOSPITAL ROOM: A LOT
MOVING TO AND FROM BED TO CHAIR: A LOT
TOILETING: A LITTLE
MOVING TO AND FROM BED TO CHAIR: A LOT
DRESSING REGULAR LOWER BODY CLOTHING: A LITTLE
DRESSING REGULAR UPPER BODY CLOTHING: A LITTLE
HELP NEEDED FOR BATHING: A LITTLE
STANDING UP FROM CHAIR USING ARMS: A LOT
CLIMB 3 TO 5 STEPS WITH RAILING: TOTAL
MOBILITY SCORE: 15

## 2024-10-16 ASSESSMENT — PAIN SCALES - GENERAL
PAINLEVEL_OUTOF10: 4
PAINLEVEL_OUTOF10: 8
PAINLEVEL_OUTOF10: 9
PAINLEVEL_OUTOF10: 7
PAINLEVEL_OUTOF10: 6
PAINLEVEL_OUTOF10: 8
PAINLEVEL_OUTOF10: 7
PAINLEVEL_OUTOF10: 8
PAINLEVEL_OUTOF10: 10 - WORST POSSIBLE PAIN
PAINLEVEL_OUTOF10: 8

## 2024-10-16 ASSESSMENT — PAIN - FUNCTIONAL ASSESSMENT
PAIN_FUNCTIONAL_ASSESSMENT: 0-10

## 2024-10-16 ASSESSMENT — ACTIVITIES OF DAILY LIVING (ADL): EFFECT OF PAIN ON DAILY ACTIVITIES: DECREASED MOBILITY

## 2024-10-16 ASSESSMENT — PAIN DESCRIPTION - DESCRIPTORS: DESCRIPTORS: PRESSURE;THROBBING;ACHING

## 2024-10-16 NOTE — PROGRESS NOTES
Physical Therapy    Physical Therapy Treatment    Patient Name: Senait Narvaez  MRN: [id number]  Department: [facility]  Room: [facility]-  Today's Date: 10/16/2024  Time Calculation  Start Time: 1457  Stop Time: 1522  Time Calculation (min): 25 min         Assessment/Plan   PT Assessment  PT Assessment Results: Decreased strength, Decreased endurance, Impaired balance, Decreased mobility, Orthopedic restrictions, Pain  Rehab Prognosis: Good  Barriers to Discharge: none  Evaluation/Treatment Tolerance: Patient limited by pain  Medical Staff Made Aware: Yes  Strengths: Attitude of self, Coping skills  Barriers to Participation: Comorbidities  End of Session Communication: Bedside nurse  Assessment Comment: The pt presented with significant L LE decreased strength and movement due increased pain, which presents as a significant barrier to functional progression.  End of Session Patient Position: Bed, 3 rail up, Alarm off, not on at start of session  PT Plan  Inpatient/Swing Bed or Outpatient: Inpatient  PT Plan  Treatment/Interventions: Bed mobility, Transfer training, Gait training, Balance training, Strengthening, Endurance training, Range of motion, Therapeutic exercise, Therapeutic activity, Home exercise program  PT Plan: Ongoing PT  PT Frequency: 3 times per week  PT Discharge Recommendations: Moderate intensity level of continued care  Equipment Recommended upon Discharge: Wheeled walker  PT Recommended Transfer Status: Assist x1  PT - OK to Discharge: Yes      General Visit Information:   PT  Visit  PT Received On: 10/16/24  Response to Previous Treatment: Patient reporting fatigue but able to participate.  General  Prior to Session Communication: Bedside nurse  Patient Position Received: Bed, 3 rail up, Alarm off, not on at start of session  Preferred Learning Style: verbal, visual, written  General Comment: The pt declined EOB and OOB activities, but agreeable to supine therapeutic exercises.    Subjective    Precautions:  Precautions  Hearing/Visual Limitations: Hearing and vision WFL with glasses.  Medical Precautions: Fall precautions, Oxygen therapy device and L/min  Precautions Comment: Pt in compliance with precautions throughout PT session.    Vital Signs (Past 2hrs)        Date/Time Vitals Session Patient Position Pulse Resp SpO2 BP MAP (mmHg)    10/16/24 1457 Pre PT  Lying  75  --  98 %  --  --     10/16/24 1611 --  --  76  18  100 %  113/53  65                   Vital Signs Comment: vitals stable     Objective   Pain:  Pain Assessment  Pain Assessment: 0-10  0-10 (Numeric) Pain Score: 8  Pain Type: Acute pain  Pain Location: Ankle  Pain Orientation: Left  Pain Descriptors: Pressure, Throbbing, Aching  Pain Frequency: Constant/continuous  Pain Onset: Ongoing  Clinical Progression: Gradually worsening (with movement)  Effect of Pain on Daily Activities: Decreased mobility  Patient's Stated Pain Goal: No pain  Pain Interventions: Therapeutic presence  Response to Interventions: Pain increased  Cognition:  Cognition  Overall Cognitive Status: Within Functional Limits  Orientation Level: Oriented X4  Following Commands: Follows all commands and directions without difficulty  Coordination:  Movements are Fluid and Coordinated: Yes  Coordination Comment: WFL  Postural Control:  Postural Control  Postural Control:  (not assessed)  Static Sitting Balance  Static Sitting-Balance Support:  (not assessed)  Dynamic Sitting Balance  Dynamic Sitting-Balance Support:  (not assessed)  Static Standing Balance  Static Standing-Balance Support:  (not assessed)  Dynamic Standing Balance  Dynamic Standing-Balance Support:  (not assessed)  Extremity/Trunk Assessments:     RUE   RUE : Within Functional Limits  LUE   LUE: Within Functional Limits  RLE   RLE : Exceptions to WFL  AROM RLE (degrees)  RLE AROM Comment: WFL  Strength RLE  R Hip Flexion: 3+/5  R Knee Flexion: 4-/5  R Knee Extension: 4-/5  R Ankle Dorsiflexion: 4+/5  R  Ankle Plantar Flexion: 4+/5  LLE   LLE : Exceptions to WFL  AROM LLE (degrees)  LLE AROM Comment: Decreased hip, knee, and ankle ROM.  Strength LLE  L Hip Flexion: 3/5  L Knee Flexion: 3/5  L Knee Extension: 3/5  L Ankle Dorsiflexion: 3+/5  L Ankle Plantar Flexion: 3+/5  Activity Tolerance:  Activity Tolerance  Endurance: Tolerates 10 - 20 min exercise with multiple rests  Treatments:  Therapeutic Exercise  Therapeutic Exercise Performed: Yes  Therapeutic Exercise Activity 1: ankle pumps x 15  Therapeutic Exercise Activity 2: heel slides x 15  Therapeutic Exercise Activity 3: SAQ x 15  Therapeutic Exercise Activity 4: SLR x 15  Therapeutic Exercise Activity 5: Hip ABD x 15    Therapeutic Activity  Therapeutic Activity Performed: No    Balance/Neuromuscular Re-Education  Balance/Neuromuscular Re-Education Activity Performed: No    Bed Mobility  Bed Mobility: No    Ambulation/Gait Training  Ambulation/Gait Training Performed: No  Transfers  Transfer: No    Stairs  Stairs: No    Outcome Measures:  [facility] Basic Mobility  Turning from your back to your side while in a flat bed without using bedrails: A little  Moving from lying on your back to sitting on the side of a flat bed without using bedrails: A little  Moving to and from bed to chair (including a wheelchair): A lot  Standing up from a chair using your arms (e.g. wheelchair or bedside chair): A lot  To walk in hospital room: Total  Climbing 3-5 steps with railing: Total  Basic Mobility - Total Score: 12    OP EDUCATION:  Outpatient Education  Individual(s) Educated: Patient  Education Provided: Home Exercise Program  Patient Response to Education: Patient/Caregiver Verbalized Understanding of Information, Patient/Caregiver Performed Return Demonstration of Exercises/Activities    Encounter Problems       Encounter Problems (Active)       PT Problem       Patient will complete supine to sit and sit to supine Independent  (Not Progressing)       Start:  09/17/24     Expected End:  10/18/24            Patient will perform sit<>stand transfer with LRAD, and Modified Independent  (Not Progressing)       Start:  09/17/24    Expected End:  10/18/24            Patient will ambulate >150' with LRAD and Modified Independent  (Not Progressing)       Start:  09/17/24    Expected End:  10/18/24            Patient will complete Tinetti Balance Assesment with a score equal to or better than 18 to reduce falls risk.    (Not Progressing)       Start:  09/17/24    Expected End:  10/18/24               Pain - Adult

## 2024-10-16 NOTE — PROGRESS NOTES
"Music Therapy Note    Senait Narvaez     Therapy Session  Referral Type: New referral this admission  Visit Type: Follow-up visit  Session Start Time: 1431  Session End Time: 1434  Intervention Delivery: In-person  Conflict of Service: Declined treatment                   Post-assessment  Total Session Time (min): 3 minutes    Narrative  Assessment Detail: Patient presented awake and alert, in bed with HOB raised, displaying calm affect. Patient and MT engaged in conversation and pt shared her frustrations with her extended admission and disappointment that she was supposed to be discharged this day and was still not well enough to do so. Patient shared that this is having an impact on her mental health. Patient politely declined a session at this time.  Follow-up: MT to follow as needed.  Patient Comments: \"I know I keep telling you I'll do it, but I just haven't been in the mood.\"    Education Documentation  No documentation found.          "

## 2024-10-16 NOTE — PROGRESS NOTES
Senait Narvaez is a 54 y.o. female on day 36 of admission presenting with Sickle cell disease with crisis and other complication.    Subjective   Seen at bedside this morning. Patient states that her ankle pain has not improved and is . No other areas of pain or concern this morning. Still denies pain or tenderness around her breast. Upon further assessment, sensation on dorsal side of toes intact but she is unable to feel sensation on the plantar side of toes and arch of foot. Yesterday sensation was intact. Discussed that she is still going down to get a breast US/mammogram today and maybe more imaging with her new decreased sensation. We discussed that her lack of sensation could be due to nerve impingement from her swelling in her leg but will further evaluate.  She is eating and drinking well and having bowel movements, LBM 10/13. Still on 1L O2. Otherwise denies shortness of breath, chest pain, abdominal pain, N/V/D, F/C, H/A.     Objective     Physical Exam  Vitals reviewed.   Constitutional:       Appearance: Normal appearance.   HENT:      Head: Normocephalic and atraumatic.      Nose: Nose normal.      Mouth/Throat:      Mouth: Mucous membranes are moist.      Pharynx: Oropharynx is clear.   Eyes:      Extraocular Movements: Extraocular movements intact.      Pupils: Pupils are equal, round, and reactive to light.   Cardiovascular:      Rate and Rhythm: Normal rate and regular rhythm.      Pulses: Normal pulses.      Heart sounds: Normal heart sounds.   Pulmonary:      Effort: Pulmonary effort is normal.      Breath sounds: Normal breath sounds.      Comments: 1L NC   Chest:   Breasts:     Right: Skin change present.      Left: Skin change present.   Abdominal:      General: Bowel sounds are normal.      Palpations: Abdomen is soft.   Musculoskeletal:      Right lower le+ Edema present.      Left lower leg: Tenderness present. 1+ Edema present.      Left ankle: Decreased range of motion.  "  Skin:     General: Skin is warm.      Comments: Dry and resolving rash in inguinal area as well as chest wall    Neurological:      General: No focal deficit present.      Mental Status: She is alert and oriented to person, place, and time. Mental status is at baseline.      Sensory: Sensory deficit present.      Comments: Left plantar side of toes and arch of foot with numbness/tingling. Decreased sensation    Psychiatric:         Mood and Affect: Mood normal.         Behavior: Behavior normal.       Last Recorded Vitals  Blood pressure 130/75, pulse 72, temperature 36.2 °C (97.2 °F), temperature source Temporal, resp. rate 18, height 1.651 m (5' 5\"), weight 113 kg (248 lb 7.3 oz), SpO2 96%.  Intake/Output last 3 Shifts:  I/O last 3 completed shifts:  In: - (0 mL/kg)   Out: 2850 (25.3 mL/kg) [Urine:2850 (0.7 mL/kg/hr)]  Weight: 112.7 kg     Relevant Results  Scheduled medications  colchicine, 0.6 mg, oral, BID  folic acid, 1 mg, oral, Daily  furosemide, 20 mg, oral, Every other day  [Held by provider] metoprolol tartrate, 12.5 mg, oral, BID  nystatin, 1 Application, Topical, BID  oxygen, , inhalation, Continuous - Inhalation  pantoprazole, 40 mg, oral, Daily before breakfast  polyethylene glycol, 17 g, oral, BID  sennosides, 2 tablet, oral, BID  thiamine, 100 mg, oral, Daily  triamcinolone, , Topical, BID  warfarin, 5 mg, oral, Daily  white petrolatum, , Topical, BID    Continuous medications     PRN medications  PRN medications: albuterol, alteplase, benzocaine-menthol, bisacodyl, dextrose, dextrose, diphenhydrAMINE, glucagon, glucagon, guaiFENesin, naloxone, ondansetron, oxyCODONE, oxyCODONE, oxygen, sodium chloride       Assessment/Plan   Assessment & Plan  Sickle cell disease with crisis and other complication  Senait Narvaez is a 54 year-old female with a PMHx of Hgb SC disease (previously on exchange transfusions q4-6 weeks, last transfusion 3/1/24), hx of SVC syndrome, recurrent DVT/PE (on lifelong Coumadin), " pHTN (6/2023), cauda equina with saddle numbness, hx SVT, fibromyalgia, Raynaud's disease, CKD II (baseline SCr~<2) and CAN who presented to OSH ED for diffuse pain and lethargy, then transferred to  MICU for hemodynamic instability. Patient was subsequently intubated due to worsening lethargic and lack of respiratory compensation from significant metabolic acidosis with concern for ACS. Vancomycin and Zosyn started for empiric coverage, patient also started on pressors for BP support. Patient was transfused 2 units pRBCs and then underwent RBCEx on 9/11. Cardiology consulted for c/f NSTEMI vs demand ischemia (ST depressions and elevated troponin), rec treat as ACS. Course further c/b severe CHARLES with peak sCr of 5.4, and acute liver injury with severely elevated liver enzymes (ALT 4119, AST >10k). Liver US only remarkable for fatty infiltration, viral hepatitis panel negative. CHARLES and liver injury improved with supportive management. Pruritic rash noted 9/14, Derm consulted and took multiple biopsies 9/15. Rash then began to worsen to involve the groin, lateral thigh, and scalp with numerous tense bullae. Discontinued heparin given concerns for potential HIT, started bivalirudin. However PF4 was negative. Patient was extubated on 9/16/2024. Started stress dose steroids given continued pressor requirements, pressors Dc'd 9/17.      Patient transferred to [facility] 9/17, PT/OT rec SNF. Coumadin bridge was started 9/18. Given persistent rash with unremarkable labs, derm re-biopsied on 9/18, findings ultimately felt to be most c/w fixed drug eruption. Dermatology contacted for bx results (9/20) rec triamcinolone cream BID and interdry cloths. Leukocytosis uptrending 9/21, repeat infectious workup negative (CXR, Abd XR, blood cultures, MRSA negative 9/21). UA with leuks. On 9/25, per nursing and patient, rash seems to be worsening. Derm re-engaged recommended to apply vasoline to all eroded/denuded areas and continue  triamcinolone cream to both the black plaques as well as the red areas across the trunk and thighs. 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 Wound care following. For warfarin dosing, per pharmacy, if INR between 2-3 can restart home warfarin 5mg daily, if INR < 2 restart bival. INR 3.4 on 9/25, warfarin stopped, repeat INR 2.8 9/26 and warfarin restarted. On 9/26, increased PO oxycodone and discontinued IV dilaudid in anticipation for discharge. Overnight on 9/29, brain attack called out of concern for change in mental status, no focal deficits noted, BAT cancelled and narcan administered x3 with improvement of mental status. Mentation improved as of 10/2, small dose oxy resumed. INR supratherapeutic 10/3 at 6.7, 5mg IV Vit K x1 given (ok per pharmacy) I/s/o ?bleeding from vagina vs wound excoriation and UA with +blood. INR  down to 1.5 (10/4). Plan for RBCEx prior to dc per Dr. Whaley, planned 10/8. 10/5 started heparin drip (per attending) given normal INR while pending apheresis line placement (previous HIT workup with negative PF4). 10/7 started bridge back to coumadin with lovenox after apheresis line. Continue therapeutic Lovenox 120mg BID with Coumadin 5mg nightly x5 days and until INR is in therapeutic range x24 hours. 10/5 ordered duplex BLE as pt c/o severe LLE pain and swelling which was negative. Left ankle x-ray (10/8) negative for acute fractures, showed soft tissue edema. CT of left ankle (10/13) with diffuse nonspecific edema without fluid collection, may represent vascular congestion, no acute osseous abnormalities, no AVN. Uric acid elevated to 7.9, started colchicine 10/15 for acute gout flare. 10/15, noted hardening of left breast and skin changes, after discussion with attending and Dr. Whaley US Bl breast and mammogram ordered. Mammogram and US negative for acute abnormalities. On 10/16, pt c/o  increased numbness and decreased sensation to plantar side of left foot most likely from nerve impingement I/s/o increased LE edema with no change in ankle pain since starting colchicine. 10/16, Colchicine was discontinued and indomethacin was started BID for gout pain. DC to SNF pending improvement of ankle pain.     # Left LE pain   # left plantar toes and arch numbness   # acute gout   - 10/5 ordered duplex BLE as pt c/o severe LLE pain and swelling which was negative  - Left ankle x-ray (10/8) negative for acute fractures, showed soft tissue edema  - CT of left ankle (10/13) with diffuse nonspecific edema without fluid collection, may represent vascular congestion, no acute osseous abnormalities, no AVN  - Uric acid elevated to 7.9 (10/15)   - s/p 1.2 mg loading dose of colchicine 10/15 and 0.6mg colchicine 10/16 AM without improvement of pain, per UTD and pharmacy, best to start colchicine 24-36 hours with initial flare, since pain started earlier this week, difficult to assess if colchicine will improve symptoms   - 10/16 c/o Left plantar side of toes and arch of foot with numbness/tingling. Decreased sensation reported in AM. All other neuro assessment WNL c/f nerve impingement from increased swelling in leg earlier this week   - stopped colchicine 10/16 and started 75mg indomethacin BID for gout pain and new concerns for ?tarsal tunnel syndrome   - s/p additional 40mg IV lasix 10/16   - PT to re-evaluate 10/16 for recs     # Left breast skin changes   - Noted hardening and skin changes to left breast on 10/15 around site of previous infection. Non-erythematous, no purulence (see below)   - After discussion with Dr. Whaley on 10/15, he recommended obtaining US   - Breast US limited left and Bl mammogram complete 10/16; Marked left breast diffuse edema, suggestive of vaso-occlusive etiology. Clinical correlation and management till resolution is  recommended. No mammographic or targeted sonographic evidence of  malignancy.    # Waxing and waning AMS---Resolved   - Felt to be due to opioids i/s/o worsening renal function and c/b respiratory acidosis  - All opioids on hold (9/28-10/2)  - 10/2 resumed oxycodone 10mg q3 hours as AMS now resolved   - continue to hold extended release oxycontin   - Keep supplemental O2 on board, but titrate to maintain SPO2 of 90-92%   - On CPAP at home, enforce nightly use to extent possible; may need BiPAP if unresolved    # CHARLES on CKD II, resolved   - Baseline ~ SCr <2, peak 5.4 this admission. 9/21 sCr 1.58 --> 1.13 (9/25)--> 1.14 (10/3)--> 0.96 (10/6) -->  1.01 (10/14) --> 0.99 (10/15) --> 1.07 (10/16)   - Likely pre-renal vs ATN given persistant hypotension over admission, on CKD II, now polyuric phase  - FeNA 0.4%  - Severe hyperkalemia with previous peaked T waves on EKG, resolved  - Nephrology following, now signed off   - s/p multiple K cocktails in MICU  - Encourage increased PO intake, strict I&Os Q6hrs      # Purpuric rash, suspected fixed drug eruption  # Concern for possible heparin-induced thrombocytopenia (HIT) -resolved  - Purple, reticular pattern of plaques noted around the bilateral breasts, scalp and groin,  with tense bullae. Non-erythematous, no purulence  - Some initial concern for possible heparin-induced thrombocytopenia with concurrent skin findings. Discontinued heparin 9/16 and transitioned to bivalirudin  - 4 T score 0, PF4 w/ reflex BINA negative. Hematology ultimately consulted, felt there was low concern for HIT and signed off  - Differential diagnosis includes purpura secondary to infection versus coagulopathy versus vasculopathy versus small and/or medium vessel vasculitis versus calciphylaxis vs drug rash  - Dermatology consulted, work-up to date:  - Low Protein C activity  - ANCA, PR3 and MPO negative  - ISABELLE neg  - cryoglobulin labs- not enough specimen to analyze  - S/p skin punch biopsies x 2 9/15 and one on 9/18, showed SUPERFICIAL EPIDERMAL DEGENERATION  WITH ERYTHROCYTE EXTRAVASATION WITH NEUTROPHILS. These findings could be seen secondary to an older trauma, or resolving erythema multiforme, a fixed drug eruption, Edgar-Christopher syndrome, or toxic epidermal necrolysis. Favor Multifocal Fixed Drug Eruption    - Dermatology contacted (9/20) about results, recommended adding vancomycin to allergy list and starting triamcinolone  - On 9/25, concern from pt and nursing that rash is not improving. Under breasts and groin still causing pain. Bilateral upper thighs and forehead rash improving   - Wound care re-consulted 9/25   - Re-engaged dermatology on 9/25 given worsening rash; recommended to apply vasoline to all eroded/denuded areas and continue triamcinolone cream to both the black plaques as well as the red areas across the trunk and thighs. 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     # Leukocytosis-- resolved   - Likely I/s/o rash vs reactive vs infectious process  - Leukocytosis noted on admission to MICU (WBC 24.8) --> 12.7 (9/25) --> 8.3 (10/3) --> as of 10/14, leukocytosis continues to be resolved   - Blood cultures 9/10, 9/12, 9/15 all NGTD  - Strep and Legionella Ag, MRSA nares negative  - CXR (9/21) largely unremarkable, again re-demonstrated perihilar prominence seen on prior XR  - Abdominal xray (9/21) unremarkable  - Repeat blood cultures x 2 (9/21) NGTD  - UA (9/21) with 500 LE  - UA (9/30) +bacteria, +leuks, urine culture (9/29) mult organisms  - UA sent 10/3 with +blood, leuks, and WBC- not reflex to cx so UCX added on to 10/3 UA and again showed multiple organisms  - Will hold off tx for UTI as pt asymptomatic and leukocytosis improved. 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  # ACS  - Previously managed through routine RBC exchanges q6 weeks, most recent RBCEx 3/1/24, per patient pausing on transfusions for time being  -  OARRS reviewed, no aberrant behavior noted  - LDH>12,000 (now downtrending), haptoglobin <30, fibrinogen 318, retic % 5.2 on presentation, bili 10.7  - Leukocytosis 24.8 on admission to MICU  - CXR (9/10) persistent atelectasis/scarring in the left lower lung  - CT CAP (9/11) Diffuse mosaic attenuation of the lung parenchyma, which can be seen in the setting of small airway disease, pulmonary edema or acute infection  - Intubated upon arrival to MICU, now s/p extubation 9/16  - s/p Vanc and Zosyn (finished both courses prior to floor transfer)  - Procal elevated 7.09 (9/11)  - Blood cultures 9/10, 9/12, 9/15 all NGTD  - Strep and Legionella Ag, MRSA nares negative  - Trop peak 1431, cards rec treat as ACS (see below)  - S/p 2u pRBCs 9/10 on arrival to MICU  - S/p exchange transfusion 9/11 AM for ACS  - S/p routine exchange transfusion 10/8 per Dr. Whaley   - Care plan from 12/6/23- For mild to moderate pain: Dilaudid 0.5mg IVP q3h as needed, for moderate to severe pain Dilaudid 1- 1.5mg q3h PRN  - On arrival to [facility], started on 0.6mg dilaudid IVP q3h PRN severe pain   - s/p 0.6mg dilaudid IVP q4h PRN breakthrough and 15mg PO oxycodone Q4 hrs severe pain (9/25)  - Per discussion with Dr. Whaley (9/25), okay to increase PO oxycodone to 15-20mg Q4hr to optimize pain for discharge to SNF.   - On 9/26, discontinued IV dilaudid and increased to 20mg PO oxycodone q4hrs for severe pain   - Opioids on hold as of 9/28 d/t AMS- resumed 2.5mg oxy q4hrs PRN severe pain on 10/2. ER oxy 10mg BID still held  - (10/5- current) increased pain reg as AMS resolved--> continue oxycodone 10mg q3hrs PRN severe pain  - Continue to hold ER Oxycontin indefinitely as was major contributor to AMS   - Bowel regimen for opioid induced constipation with Senna 2tabs BID, Miralax BID, and dulcolax suppository 10mg PRN constipation  - Zofran for opioid induced nausea, no benadryl ordered (assumed from previous AMS but also no reports of opioid-induced  pruritus)  - Continue home Folic Acid 1mg daily, continue thiamine 100mg daily  - Removed femoral line 10/15      # ST depression with tropinemia, suspected 2/2 demand ischemia iso ACS  # Hx SVT  - Elevated troponins on admission, peak 1431 and now downtrending   - EKG with ST depressions, likely Type II MI due to vaso-occlusive crisis and anemia  - TTE 9/11: EF 60-65%, RV enlarged and reduced in function which appears stable when compared to last TTE (1/2024)  - Troponin leak is likely due to cardiac demand vs supply mismatch in the setting of worsening anemia and vaso-occlusive crisis  - Cardiology consulted, now signed off with indication to treat troponemia as ACS  - Lasix held in MICU 2/2 hypotension and CHARLES, can resume as able   - Metop 12.5mg BID held in MICU 2/2 hypotension- BP and HR continue to remain stable, holding off on restarting for now  - s/p 1x dose of po lasix 20mg on 10/9 and s/p IVP 20mg lasix on 10/10 for volume overload, plan to resume home dose lasix once LE edema back to patient's baseline   - home dose of laix of 20mg po every other day resumed on 10/12  - 10/14 s/p 1x dose of IVP lasix for increased LE edema   - 10/15 and 10/16 s/p 1x dose of 40mg IVP lasix for increased LE edema   - Follow-up with Cardiology outpt, FUV 2/13/25     # Acute liver injury, improving  # Direct hyperbilirubinuria, improving  - Suspected 2/2 hemodynamic instability versus sickle cell crisis  - On presentation to  MICU ALT 2611, AST 4727, T bili 10.2, INR 3.0; continues to improve  - CT with hepatic venous congestion, patient lacks gallbladder  - Liver US with Doppler: Diffuse fatty infiltration, unremarkable doppler interrogation of the liver  - EBV IgG positive, IgM negative  - CMV IgG positive, PCR negative  - Acute hepatitis panel unremarkable  - Hepatology initially following, now signed off  - Will continue to monitor, trend daily CMP     # pHTN   - Initial c/f CTEPH as imaging findings with dilated PA,  filling defects, and mosaicism  - VQ scan (6/13/23) with non anatomical basal defects and RUL defect due to scar--> not favoring CTEPH per Dr. Yi and Dr. 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 On lifelong anticoagulation, DOAC discouraged due to high risk of clotting   - Home Coumadin 5mg daily initially HELD on admission to MICU 2/2 unstable course  - Restarted coumadin 9/18 PM, will plan to bridge with bival, Ok'd by heme   - 9/20 INR 3.8, bival and home warfarin held. Pharmacy rec repeat INR 4 hours after bival was held. If the INR < 2, we can restart the bival, but if the INR 2-3, we can continue with warfarin monotherapy   - 9/20 repeat INR 3.7, pharmacy rec continuing to hold both medications  - 9/21 INR 3.2, per pharmacy - get repeat INR in AM 9/22, if INR between 2-3 can restart home warfarin 5mg daily  -INR 2.6 (9/24) after restarting Warfarin 5mg, will maintain INR goal of 2-3  - INR 3.4 (9/25). Held warfarin on 9/25. Repeat INR 9/26. Plan to restart if <3   - INR 2.8 (9/26), restarted warfarin, recheck INR 9/27   - INR 4.0 (9/30), held warfarin, repeat INR 10/1 ordered, plan to restart if <3  - INR 6.8 (10/1), patient without any active bleeding, per attending and pharmacy, warfarin held   - INR 6.8 (10/2), patient without any active bleeding, per attending  and pharmacy, warfarin held   - INR 6.7 (10/3), cont to hold warfarin. Ordered 5mg IV Vit K x1 (ok per pharmacy) d/t ?bleeding, vaginal vs wound excoriation and UA +blood  - 10/4 INR normalized to 1.6, continue to hold warfarin  - 10/5 pt c/o severe LLE pain and swelling; duplex BLE negative  - 10/5 INR 1.2; started heparin drip given normal INR while pending apheresis line placement (previous HIT workup with negative PF4)--> held 10/7 at 0400 for apheresis line placement  - 10/7 INR 1.2--> started bridge back to coumadin tonight with lovenox. Started after apheresis line placement will start coumadin 5mg and start therapeutic lovenox 120mg BID x5 days AND until INR therepeutic x24 hours (regimen discussed with pharmacy)  - 10/10 INR 1.6   - Home coumadin 5mg daily dose resumed 10/12  - 10/13 INR 1.9   - INR remains therapeutic daily since 10/14   - Follows with Coumadin clinic outpatient  - Plan to consult vascular medicine if INR is not therapeutic on home regimen    # CAN  - cont home CPAP   - cont home Albuterol PRN     # H/O Cauda Equine syndrome  - Follows Dr. Delacruz as outpatient  - no current issues      # DISPO  - Full code- confirmed on admission  - NOK: Tati Salgaod (mother) (#[phone])  - PT/OT rec SNF, plan for rehab at [facility] [place]  - DC to SNF pending pre-cert approval  - Derm FUV 11/7, Sickle Cell FUV 11/18, Cardiology FUV 2/13      I spent >60 minutes in the professional and overall care of this patient.    Assessment and plan discussed with attending physician Dr. Shellie Kerns, APRN-CNP

## 2024-10-16 NOTE — PROGRESS NOTES
10/10/24 1100   Discharge Planning   Who is requesting discharge planning? Patient   Home or Post Acute Services Post acute facilities (Rehab/SNF/etc)   Type of Post Acute Facility Services Skilled nursing   Expected Discharge Disposition SNF  ([facility] [place])     Care Transitions Note  10/10/24  Patient had exchange recently and is now bridging coumadin. Plan to start pre-cert for SNF [facility] [place] on Sunday. PT/OT notes are needed for pre-cert. Pre-cert team requests that PT/OT see patient Friday/Saturday so that pre-cert can begin Sunday in preparation for dc Monday. YULI updated whiteboard to reflect need for PT/OT with these instructions. PT made aware via secure chat. OT not signed in to patient today but will attempt to contact. Following for other dc needs.   YULI Zamorano     UPDATE 10/14/24  Pre-cert initiated by  pre-cert team. Current notes uploaded.   YULI Zamorano     UPDATE 3:36 pm 10/14  Precert Status: Approved.  [facility] Auth ID # [id number].  Dates: 10/14/2024 - 10/16/2024   Will set up transport when final go-ahead is given from medical team. Pt was getting additional work up for ankle pain. 7000 done in HENS. Facility made aware.  YULI Zamorano    UPDATE 10/16/24  Patient planned for discharge tomorrow to [facility] [place] pending improvement in ankle pain. Pain meds adjusted by team. Updates sent to [facility] [place]. LSW asked for extension of pre-cert to tomorrow. Per pre-cert team, pt can admit up to 11:59pm 10/17 on this auth. Otherwise we would need to submit a new request. Team aware.  YULI Zamorano

## 2024-10-16 NOTE — ASSESSMENT & PLAN NOTE
Senait Narvaez is a 54 year-old female with a PMHx of Hgb SC disease (previously on exchange transfusions q4-6 weeks, last transfusion 3/1/24), hx of SVC syndrome, recurrent DVT/PE (on lifelong Coumadin), pHTN (6/2023), cauda equina with saddle numbness, hx SVT, fibromyalgia, Raynaud's disease, CKD II (baseline SCr~<2) and CAN who presented to [facility] ED for diffuse pain and lethargy, then transferred to  MICU for hemodynamic instability. Patient was subsequently intubated due to worsening lethargic and lack of respiratory compensation from significant metabolic acidosis with concern for ACS. Vancomycin and Zosyn started for empiric coverage, patient also started on pressors for BP support. Patient was transfused 2 units pRBCs and then underwent RBCEx on 9/11. Cardiology consulted for c/f NSTEMI vs demand ischemia (ST depressions and elevated troponin), rec treat as ACS. Course further c/b severe CHARLES with peak sCr of 5.4, and acute liver injury with severely elevated liver enzymes (ALT 4119, AST >10k). Liver US only remarkable for fatty infiltration, viral hepatitis panel negative. CHARLES and liver injury improved with supportive management. Pruritic rash noted 9/14, Derm consulted and took multiple biopsies 9/15. Rash then began to worsen to involve the groin, lateral thigh, and scalp with numerous tense bullae. Discontinued heparin given concerns for potential HIT, started bivalirudin. However PF4 was negative. Patient was extubated on 9/16/2024. Started stress dose steroids given continued pressor requirements, pressors Dc'd 9/17.      Patient transferred to [facility] 9/17, PT/OT rec SNF. Coumadin bridge was started 9/18. Given persistent rash with unremarkable labs, derm re-biopsied on 9/18, findings ultimately felt to be most c/w fixed drug eruption. Dermatology contacted for bx results (9/20) rec triamcinolone cream BID and interdry cloths. Leukocytosis uptrending 9/21, repeat infectious workup negative (CXR, Abd XR, blood  cultures, MRSA negative 9/21). UA with leuks. On 9/25, per nursing and patient, rash seems to be worsening. Derm re-engaged recommended to apply vasoline to all eroded/denuded areas and continue triamcinolone cream to both the black plaques as well as the red areas across the trunk and thighs. 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 Wound care following. For warfarin dosing, per pharmacy, if INR between 2-3 can restart home warfarin 5mg daily, if INR < 2 restart bival. INR 3.4 on 9/25, warfarin stopped, repeat INR 2.8 9/26 and warfarin restarted. On 9/26, increased PO oxycodone and discontinued IV dilaudid in anticipation for discharge. Overnight on 9/29, brain attack called out of concern for change in mental status, no focal deficits noted, BAT cancelled and narcan administered x3 with improvement of mental status. Mentation improved as of 10/2, small dose oxy resumed. INR supratherapeutic 10/3 at 6.7, 5mg IV Vit K x1 given (ok per pharmacy) I/s/o ?bleeding from vagina vs wound excoriation and UA with +blood. INR  down to 1.5 (10/4). Plan for RBCEx prior to dc per Dr. Whaley, planned 10/8. 10/5 started heparin drip (per attending) given normal INR while pending apheresis line placement (previous HIT workup with negative PF4). 10/7 started bridge back to coumadin with lovenox after apheresis line. Continue therapeutic Lovenox 120mg BID with Coumadin 5mg nightly x5 days and until INR is in therapeutic range x24 hours. 10/5 ordered duplex BLE as pt c/o severe LLE pain and swelling which was negative. Left ankle x-ray (10/8) negative for acute fractures, showed soft tissue edema. CT of left ankle (10/13) with diffuse nonspecific edema without fluid collection, may represent vascular congestion, no acute osseous abnormalities, no AVN. Uric acid elevated to 7.9, started colchicine 10/15 for acute gout flare. 10/15, noted  hardening of left breast and skin changes, after discussion with attending and Dr. Whaley US Bl breast and mammogram ordered. Mammogram and US negative for acute abnormalities. On 10/16, pt c/o increased numbness and decreased sensation to plantar side of left foot most likely from nerve impingement I/s/o increased LE edema with no change in ankle pain since starting colchicine. 10/16, Colchicine was discontinued and indomethacin was started BID for gout pain. DC to SNF pending improvement of ankle pain.     # Left LE pain   # left plantar toes and arch numbness   # acute gout   - 10/5 ordered duplex BLE as pt c/o severe LLE pain and swelling which was negative  - Left ankle x-ray (10/8) negative for acute fractures, showed soft tissue edema  - CT of left ankle (10/13) with diffuse nonspecific edema without fluid collection, may represent vascular congestion, no acute osseous abnormalities, no AVN  - Uric acid elevated to 7.9 (10/15)   - s/p 1.2 mg loading dose of colchicine 10/15 and 0.6mg colchicine 10/16 AM without improvement of pain, per UTD and pharmacy, best to start colchicine 24-36 hours with initial flare, since pain started earlier this week, difficult to assess if colchicine will improve symptoms   - 10/16 c/o Left plantar side of toes and arch of foot with numbness/tingling. Decreased sensation reported in AM. All other neuro assessment WNL c/f nerve impingement from increased swelling in leg earlier this week   - stopped colchicine 10/16 and started 75mg indomethacin BID for gout pain and new concerns for ?tarsal tunnel syndrome   - s/p additional 40mg IV lasix 10/16   - PT to re-evaluate 10/16 for recs     # Left breast skin changes   - Noted hardening and skin changes to left breast on 10/15 around site of previous infection. Non-erythematous, no purulence (see below)   - After discussion with Dr. Whaley on 10/15, he recommended obtaining US   - Breast US limited left and Bl mammogram complete 10/16;  Marked left breast diffuse edema, suggestive of vaso-occlusive etiology. Clinical correlation and management till resolution is  recommended. No mammographic or targeted sonographic evidence of malignancy.    # Waxing and waning AMS---Resolved   - Felt to be due to opioids i/s/o worsening renal function and c/b respiratory acidosis  - All opioids on hold (9/28-10/2)  - 10/2 resumed oxycodone 10mg q3 hours as AMS now resolved   - continue to hold extended release oxycontin   - Keep supplemental O2 on board, but titrate to maintain SPO2 of 90-92%   - On CPAP at home, enforce nightly use to extent possible; may need BiPAP if unresolved    # CHARLES on CKD II, resolved   - Baseline ~ SCr <2, peak 5.4 this admission. 9/21 sCr 1.58 --> 1.13 (9/25)--> 1.14 (10/3)--> 0.96 (10/6) -->  1.01 (10/14) --> 0.99 (10/15) --> 1.07 (10/16)   - Likely pre-renal vs ATN given persistant hypotension over admission, on CKD II, now polyuric phase  - FeNA 0.4%  - Severe hyperkalemia with previous peaked T waves on EKG, resolved  - Nephrology following, now signed off   - s/p multiple K cocktails in MICU  - Encourage increased PO intake, strict I&Os Q6hrs      # Purpuric rash, suspected fixed drug eruption  # Concern for possible heparin-induced thrombocytopenia (HIT) -resolved  - Purple, reticular pattern of plaques noted around the bilateral breasts, scalp and groin,  with tense bullae. Non-erythematous, no purulence  - Some initial concern for possible heparin-induced thrombocytopenia with concurrent skin findings. Discontinued heparin 9/16 and transitioned to bivalirudin  - 4 T score 0, PF4 w/ reflex BINA negative. Hematology ultimately consulted, felt there was low concern for HIT and signed off  - Differential diagnosis includes purpura secondary to infection versus coagulopathy versus vasculopathy versus small and/or medium vessel vasculitis versus calciphylaxis vs drug rash  - Dermatology consulted, work-up to date:  - Low Protein C  activity  - ANCA, PR3 and MPO negative  - ISABELLE neg  - cryoglobulin labs- not enough specimen to analyze  - S/p skin punch biopsies x 2 9/15 and one on 9/18, showed SUPERFICIAL EPIDERMAL DEGENERATION WITH ERYTHROCYTE EXTRAVASATION WITH NEUTROPHILS. These findings could be seen secondary to an older trauma, or resolving erythema multiforme, a fixed drug eruption, Edgar-Christopher syndrome, or toxic epidermal necrolysis. Favor Multifocal Fixed Drug Eruption    - Dermatology contacted (9/20) about results, recommended adding vancomycin to allergy list and starting triamcinolone  - On 9/25, concern from pt and nursing that rash is not improving. Under breasts and groin still causing pain. Bilateral upper thighs and forehead rash improving   - Wound care re-consulted 9/25   - Re-engaged dermatology on 9/25 given worsening rash; recommended to apply vasoline to all eroded/denuded areas and continue triamcinolone cream to both the black plaques as well as the red areas across the trunk and thighs. 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     # Leukocytosis-- resolved   - Likely I/s/o rash vs reactive vs infectious process  - Leukocytosis noted on admission to MICU (WBC 24.8) --> 12.7 (9/25) --> 8.3 (10/3) --> as of 10/14, leukocytosis continues to be resolved   - Blood cultures 9/10, 9/12, 9/15 all NGTD  - Strep and Legionella Ag, MRSA nares negative  - CXR (9/21) largely unremarkable, again re-demonstrated perihilar prominence seen on prior XR  - Abdominal xray (9/21) unremarkable  - Repeat blood cultures x 2 (9/21) NGTD  - UA (9/21) with 500 LE  - UA (9/30) +bacteria, +leuks, urine culture (9/29) mult organisms  - UA sent 10/3 with +blood, leuks, and WBC- not reflex to cx so UCX added on to 10/3 UA and again showed multiple organisms  - Will hold off tx for UTI as pt asymptomatic and leukocytosis improved. Plan for repeat  UA if any  s/sx    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  # ACS  - Previously managed through routine RBC exchanges q6 weeks, most recent RBCEx 3/1/24, per patient pausing on transfusions for time being  - OARRS reviewed, no aberrant behavior noted  - LDH>12,000 (now downtrending), haptoglobin <30, fibrinogen 318, retic % 5.2 on presentation, bili 10.7  - Leukocytosis 24.8 on admission to MICU  - CXR (9/10) persistent atelectasis/scarring in the left lower lung  - CT CAP (9/11) Diffuse mosaic attenuation of the lung parenchyma, which can be seen in the setting of small airway disease, pulmonary edema or acute infection  - Intubated upon arrival to MICU, now s/p extubation 9/16  - s/p Vanc and Zosyn (finished both courses prior to floor transfer)  - Procal elevated 7.09 (9/11)  - Blood cultures 9/10, 9/12, 9/15 all NGTD  - Strep and Legionella Ag, MRSA nares negative  - Trop peak 1431, cards rec treat as ACS (see below)  - [facility]  - S/p exchange transfusion 9/11 AM for ACS  - S/p routine exchange transfusion 10/8 per Dr. Whaley   - Care plan from 12/6/23- For mild to moderate pain: Dilaudid 0.5mg IVP q3h as needed, for moderate to severe pain Dilaudid 1- 1.5mg q3h PRN  - On arrival to [facility], started on 0.6mg dilaudid IVP q3h PRN severe pain   - s/p 0.6mg dilaudid IVP q4h PRN breakthrough and 15mg PO oxycodone Q4 hrs severe pain (9/25)  - Per discussion with Dr. Whaley (9/25), okay to increase PO oxycodone to 15-20mg Q4hr to optimize pain for discharge to SNF.   - On 9/26, discontinued IV dilaudid and increased to 20mg PO oxycodone q4hrs for severe pain   - Opioids on hold as of 9/28 d/t AMS- resumed 2.5mg oxy q4hrs PRN severe pain on 10/2. ER oxy 10mg BID still held  - (10/5- current) increased pain reg as AMS resolved--> continue oxycodone 10mg q3hrs PRN severe pain  - Continue to hold ER Oxycontin indefinitely as was major contributor to AMS   - Bowel regimen for opioid induced constipation with  Senna 2tabs BID, Miralax BID, and dulcolax suppository 10mg PRN constipation  - Zofran for opioid induced nausea, no benadryl ordered (assumed from previous AMS but also no reports of opioid-induced pruritus)  - Continue home Folic Acid 1mg daily, continue thiamine 100mg daily  - Removed femoral line 10/15      # ST depression with tropinemia, suspected 2/2 demand ischemia iso ACS  # Hx SVT  - Elevated troponins on admission, peak 1431 and now downtrending   - EKG with ST depressions, likely Type II MI due to vaso-occlusive crisis and anemia  - TTE 9/11: EF 60-65%, RV enlarged and reduced in function which appears stable when compared to last TTE (1/2024)  - Troponin leak is likely due to cardiac demand vs supply mismatch in the setting of worsening anemia and vaso-occlusive crisis  - Cardiology consulted, now signed off with indication to treat troponemia as ACS  - Lasix held in MICU 2/2 hypotension and CHARLES, can resume as able   - Metop 12.5mg BID held in MICU 2/2 hypotension- BP and HR continue to remain stable, holding off on restarting for now  - s/p 1x dose of po lasix 20mg on 10/9 and s/p IVP 20mg lasix on 10/10 for volume overload, plan to resume home dose lasix once LE edema back to patient's baseline   - home dose of laix of 20mg po every other day resumed on 10/12  - 10/14 s/p 1x dose of IVP lasix for increased LE edema   - 10/15 and 10/16 s/p 1x dose of 40mg IVP lasix for increased LE edema   - Follow-up with Cardiology outpt, FUV 2/13/25     # Acute liver injury, improving  # Direct hyperbilirubinuria, improving  - Suspected 2/2 hemodynamic instability versus sickle cell crisis  - On presentation to  MICU ALT 2611, AST 4727, T bili 10.2, INR 3.0; continues to improve  - CT with hepatic venous congestion, patient lacks gallbladder  - Liver US with Doppler: Diffuse fatty infiltration, unremarkable doppler interrogation of the liver  - EBV IgG positive, IgM negative  - CMV IgG positive, PCR negative  -  Acute hepatitis panel unremarkable  - Hepatology initially following, now signed off  - Will continue to monitor, trend daily CMP     # pHTN   - Initial c/f CTEPH as imaging findings with dilated PA, filling defects, and mosaicism  - VQ scan (6/13/23) with non anatomical basal defects and RUL defect due to scar--> not favoring CTEPH per Dr. Yi and Dr. 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 On lifelong anticoagulation, DOAC discouraged due to high risk of clotting   - Home Coumadin 5mg daily initially HELD on admission to MICU 2/2 unstable course  - Restarted coumadin 9/18 PM, will plan to bridge with bival, Ok'd by heme   - 9/20 INR 3.8, bival and home warfarin held. Pharmacy rec repeat INR 4 hours after bival was held. If the INR < 2, we can restart the bival, but if the INR 2-3, we can continue with warfarin monotherapy   - 9/20 repeat INR 3.7, pharmacy rec continuing to hold both medications  - 9/21 INR 3.2, per pharmacy - get repeat INR in AM 9/22, if INR between 2-3 can restart home warfarin 5mg daily  -INR 2.6 (9/24) after restarting Warfarin 5mg, will maintain INR goal of 2-3  - INR 3.4 (9/25). Held warfarin on 9/25. Repeat INR 9/26. Plan to restart if <3   - INR 2.8 (9/26), restarted warfarin, recheck INR 9/27   - INR 4.0 (9/30), held warfarin, repeat INR 10/1  ordered, plan to restart if <3  - INR 6.8 (10/1), patient without any active bleeding, per attending and pharmacy, warfarin held   - INR 6.8 (10/2), patient without any active bleeding, per attending and pharmacy, warfarin held   - INR 6.7 (10/3), cont to hold warfarin. Ordered 5mg IV Vit K x1 (ok per pharmacy) d/t ?bleeding, vaginal vs wound excoriation and UA +blood  - 10/4 INR normalized to 1.6, continue to hold warfarin  - 10/5 pt c/o severe LLE pain and swelling; duplex BLE negative  - 10/5 INR 1.2; started heparin drip given normal INR while pending apheresis line placement (previous HIT workup with negative PF4)--> held 10/7 at 0400 for apheresis line placement  - 10/7 INR 1.2--> started bridge back to coumadin tonight with lovenox. Started after apheresis line placement will start coumadin 5mg and start therapeutic lovenox 120mg BID x5 days AND until INR therepeutic x24 hours (regimen discussed with pharmacy)  - 10/10 INR 1.6   - Home coumadin 5mg daily dose resumed 10/12  - 10/13 INR 1.9   - INR remains therapeutic daily since 10/14   - Follows with Coumadin clinic outpatient  - Plan to consult vascular medicine if INR is not therapeutic on home regimen    # CAN  - cont home CPAP   - cont home Albuterol PRN     # H/O Cauda Equine syndrome  - Follows Dr. Delacruz as outpatient  - no current issues      # DISPO  - Full code- confirmed on admission  - NOK: Tati Salgado (mother) (#[phone])  - PT/OT rec SNF, plan for rehab at [facility]  - DC to SNF pending pre-cert approval  - Derm FUV 11/7, Sickle Cell FUV 11/18, Cardiology FUV 2/13

## 2024-10-17 LAB
ALBUMIN SERPL BCP-MCNC: 2.4 G/DL (ref 3.4–5)
ALBUMIN SERPL BCP-MCNC: 2.4 G/DL (ref 3.4–5)
ALP SERPL-CCNC: 170 U/L (ref 33–110)
ALT SERPL W P-5'-P-CCNC: 15 U/L (ref 7–45)
ANION GAP SERPL CALC-SCNC: 12 MMOL/L (ref 10–20)
ANION GAP SERPL CALC-SCNC: 12 MMOL/L (ref 10–20)
AST SERPL W P-5'-P-CCNC: 36 U/L (ref 9–39)
BASOPHILS # BLD AUTO: 0.02 X10*3/UL (ref 0–0.1)
BASOPHILS NFR BLD AUTO: 0.2 %
BILIRUB SERPL-MCNC: 1.4 MG/DL (ref 0–1.2)
BUN SERPL-MCNC: 23 MG/DL (ref 6–23)
BUN SERPL-MCNC: 25 MG/DL (ref 6–23)
CALCIUM SERPL-MCNC: 8.7 MG/DL (ref 8.6–10.6)
CALCIUM SERPL-MCNC: 8.9 MG/DL (ref 8.6–10.6)
CHLORIDE SERPL-SCNC: 92 MMOL/L (ref 98–107)
CHLORIDE SERPL-SCNC: 93 MMOL/L (ref 98–107)
CO2 SERPL-SCNC: 34 MMOL/L (ref 21–32)
CO2 SERPL-SCNC: 36 MMOL/L (ref 21–32)
CREAT SERPL-MCNC: 1.39 MG/DL (ref 0.5–1.05)
CREAT SERPL-MCNC: 1.57 MG/DL (ref 0.5–1.05)
EGFRCR SERPLBLD CKD-EPI 2021: 39 ML/MIN/1.73M*2
EGFRCR SERPLBLD CKD-EPI 2021: 45 ML/MIN/1.73M*2
EOSINOPHIL # BLD AUTO: 0.26 X10*3/UL (ref 0–0.7)
EOSINOPHIL NFR BLD AUTO: 3.2 %
ERYTHROCYTE [DISTWIDTH] IN BLOOD BY AUTOMATED COUNT: 18 % (ref 11.5–14.5)
GLUCOSE SERPL-MCNC: 109 MG/DL (ref 74–99)
GLUCOSE SERPL-MCNC: 71 MG/DL (ref 74–99)
HCT VFR BLD AUTO: 30.2 % (ref 36–46)
HGB BLD-MCNC: 9.8 G/DL (ref 12–16)
HGB RETIC QN: 30 PG (ref 28–38)
IMM GRANULOCYTES # BLD AUTO: 0.03 X10*3/UL (ref 0–0.7)
IMM GRANULOCYTES NFR BLD AUTO: 0.4 % (ref 0–0.9)
IMMATURE RETIC FRACTION: 13.3 %
INR PPP: 2.9 (ref 0.9–1.1)
LDH SERPL L TO P-CCNC: 313 U/L (ref 84–246)
LYMPHOCYTES # BLD AUTO: 1.48 X10*3/UL (ref 1.2–4.8)
LYMPHOCYTES NFR BLD AUTO: 18.4 %
MAGNESIUM SERPL-MCNC: 1.84 MG/DL (ref 1.6–2.4)
MCH RBC QN AUTO: 29.2 PG (ref 26–34)
MCHC RBC AUTO-ENTMCNC: 32.5 G/DL (ref 32–36)
MCV RBC AUTO: 90 FL (ref 80–100)
MONOCYTES # BLD AUTO: 0.76 X10*3/UL (ref 0.1–1)
MONOCYTES NFR BLD AUTO: 9.4 %
NEUTROPHILS # BLD AUTO: 5.51 X10*3/UL (ref 1.2–7.7)
NEUTROPHILS NFR BLD AUTO: 68.4 %
NRBC BLD-RTO: 1 /100 WBCS (ref 0–0)
PHOSPHATE SERPL-MCNC: 4.7 MG/DL (ref 2.5–4.9)
PLATELET # BLD AUTO: 358 X10*3/UL (ref 150–450)
POTASSIUM SERPL-SCNC: 4.4 MMOL/L (ref 3.5–5.3)
POTASSIUM SERPL-SCNC: 4.6 MMOL/L (ref 3.5–5.3)
PROT SERPL-MCNC: 6 G/DL (ref 6.4–8.2)
PROTHROMBIN TIME: 32.7 SECONDS (ref 9.8–12.8)
RBC # BLD AUTO: 3.36 X10*6/UL (ref 4–5.2)
RETICS #: 0.19 X10*6/UL (ref 0.02–0.08)
RETICS/RBC NFR AUTO: 5.7 % (ref 0.5–2)
SODIUM SERPL-SCNC: 135 MMOL/L (ref 136–145)
SODIUM SERPL-SCNC: 135 MMOL/L (ref 136–145)
WBC # BLD AUTO: 8.1 X10*3/UL (ref 4.4–11.3)

## 2024-10-17 PROCEDURE — 2500000002 HC RX 250 W HCPCS SELF ADMINISTERED DRUGS (ALT 637 FOR MEDICARE OP, ALT 636 FOR OP/ED): Performed by: NURSE PRACTITIONER

## 2024-10-17 PROCEDURE — 2500000001 HC RX 250 WO HCPCS SELF ADMINISTERED DRUGS (ALT 637 FOR MEDICARE OP): Performed by: NURSE PRACTITIONER

## 2024-10-17 PROCEDURE — 2500000001 HC RX 250 WO HCPCS SELF ADMINISTERED DRUGS (ALT 637 FOR MEDICARE OP)

## 2024-10-17 PROCEDURE — 36415 COLL VENOUS BLD VENIPUNCTURE: CPT

## 2024-10-17 PROCEDURE — 83615 LACTATE (LD) (LDH) ENZYME: CPT

## 2024-10-17 PROCEDURE — 85045 AUTOMATED RETICULOCYTE COUNT: CPT

## 2024-10-17 PROCEDURE — 99233 SBSQ HOSP IP/OBS HIGH 50: CPT

## 2024-10-17 PROCEDURE — 2500000001 HC RX 250 WO HCPCS SELF ADMINISTERED DRUGS (ALT 637 FOR MEDICARE OP): Performed by: PHYSICIAN ASSISTANT

## 2024-10-17 PROCEDURE — 2500000004 HC RX 250 GENERAL PHARMACY W/ HCPCS (ALT 636 FOR OP/ED)

## 2024-10-17 PROCEDURE — 1170000001 HC PRIVATE ONCOLOGY ROOM DAILY

## 2024-10-17 PROCEDURE — 83735 ASSAY OF MAGNESIUM: CPT | Performed by: PHYSICIAN ASSISTANT

## 2024-10-17 PROCEDURE — 85610 PROTHROMBIN TIME: CPT

## 2024-10-17 PROCEDURE — 80069 RENAL FUNCTION PANEL: CPT | Mod: CCI

## 2024-10-17 PROCEDURE — 85025 COMPLETE CBC W/AUTO DIFF WBC: CPT | Performed by: PHYSICIAN ASSISTANT

## 2024-10-17 PROCEDURE — 80053 COMPREHEN METABOLIC PANEL: CPT

## 2024-10-17 RX ORDER — FUROSEMIDE 10 MG/ML
20 INJECTION INTRAMUSCULAR; INTRAVENOUS ONCE
Status: COMPLETED | OUTPATIENT
Start: 2024-10-17 | End: 2024-10-17

## 2024-10-17 ASSESSMENT — COGNITIVE AND FUNCTIONAL STATUS - GENERAL
WALKING IN HOSPITAL ROOM: A LOT
DRESSING REGULAR UPPER BODY CLOTHING: A LITTLE
TURNING FROM BACK TO SIDE WHILE IN FLAT BAD: A LITTLE
WALKING IN HOSPITAL ROOM: A LOT
MOBILITY SCORE: 16
MOVING TO AND FROM BED TO CHAIR: A LITTLE
DRESSING REGULAR UPPER BODY CLOTHING: A LITTLE
TOILETING: A LITTLE
TOILETING: A LITTLE
DAILY ACTIVITIY SCORE: 20
DRESSING REGULAR LOWER BODY CLOTHING: A LITTLE
TURNING FROM BACK TO SIDE WHILE IN FLAT BAD: A LITTLE
STANDING UP FROM CHAIR USING ARMS: A LOT
CLIMB 3 TO 5 STEPS WITH RAILING: A LOT
MOBILITY SCORE: 16
MOVING TO AND FROM BED TO CHAIR: A LITTLE
DRESSING REGULAR LOWER BODY CLOTHING: A LITTLE
CLIMB 3 TO 5 STEPS WITH RAILING: A LOT
HELP NEEDED FOR BATHING: A LITTLE
STANDING UP FROM CHAIR USING ARMS: A LOT

## 2024-10-17 ASSESSMENT — PAIN SCALES - GENERAL
PAINLEVEL_OUTOF10: 7
PAINLEVEL_OUTOF10: 7
PAINLEVEL_OUTOF10: 8
PAINLEVEL_OUTOF10: 8
PAINLEVEL_OUTOF10: 7

## 2024-10-17 ASSESSMENT — PAIN - FUNCTIONAL ASSESSMENT
PAIN_FUNCTIONAL_ASSESSMENT: 0-10
PAIN_FUNCTIONAL_ASSESSMENT: 0-10

## 2024-10-17 NOTE — CARE PLAN
The patient's goals for the shift include      The clinical goals for the shift include patient will remain hds throughout the shift 10/17/24 0700      Problem: Pain - Adult  Goal: Verbalizes/displays adequate comfort level or baseline comfort level  Outcome: Progressing     Problem: Safety - Adult  Goal: Free from fall injury  Outcome: Progressing     Problem: Skin  Goal: Decreased wound size/increased tissue granulation at next dressing change  Outcome: Progressing  Goal: Participates in plan/prevention/treatment measures  Outcome: Progressing  Goal: Prevent/manage excess moisture  Outcome: Progressing  Flowsheets (Taken 10/16/2024 2123)  Prevent/manage excess moisture: Cleanse incontinence/protect with barrier cream  Goal: Prevent/minimize sheer/friction injuries  Outcome: Progressing  Goal: Promote/optimize nutrition  Outcome: Progressing  Goal: Promote skin healing  Outcome: Progressing

## 2024-10-17 NOTE — ASSESSMENT & PLAN NOTE
Senait Narvaez is a 54 year-old female with a PMHx of Hgb SC disease (previously on exchange transfusions q4-6 weeks, last transfusion 3/1/24), hx of SVC syndrome, recurrent DVT/PE (on lifelong Coumadin), pHTN (6/2023), cauda equina with saddle numbness, hx SVT, fibromyalgia, Raynaud's disease, CKD II (baseline SCr~<2) and CAN who presented to [facility] ED for diffuse pain and lethargy, then transferred to  MICU for hemodynamic instability. Patient was subsequently intubated due to worsening lethargic and lack of respiratory compensation from significant metabolic acidosis with concern for ACS. Vancomycin and Zosyn started for empiric coverage, patient also started on pressors for BP support. Patient was transfused 2 units pRBCs and then underwent RBCEx on 9/11. Cardiology consulted for c/f NSTEMI vs demand ischemia (ST depressions and elevated troponin), rec treat as ACS. Course further c/b severe CHARLES with peak sCr of 5.4, and acute liver injury with severely elevated liver enzymes (ALT 4119, AST >10k). Liver US only remarkable for fatty infiltration, viral hepatitis panel negative. CHARLES and liver injury improved with supportive management. Pruritic rash noted 9/14, Derm consulted and took multiple biopsies 9/15. Rash then began to worsen to involve the groin, lateral thigh, and scalp with numerous tense bullae. Discontinued heparin given concerns for potential HIT, started bivalirudin. However PF4 was negative. Patient was extubated on 9/16/2024. Started stress dose steroids given continued pressor requirements, pressors Dc'd 9/17.      Patient transferred to [facility] 9/17, PT/OT rec SNF. Coumadin bridge was started 9/18. Given persistent rash with unremarkable labs, derm re-biopsied on 9/18, findings ultimately felt to be most c/w fixed drug eruption. Dermatology contacted for bx results (9/20) rec triamcinolone cream BID and interdry cloths. Leukocytosis uptrending 9/21, repeat infectious workup negative (CXR, Abd XR, blood  cultures, MRSA negative 9/21). UA with leuks. On 9/25, per nursing and patient, rash seems to be worsening. Derm re-engaged recommended to apply vasoline to all eroded/denuded areas and continue triamcinolone cream to both the black plaques as well as the red areas across the trunk and thighs. 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 Wound care following. For warfarin dosing, per pharmacy, if INR between 2-3 can restart home warfarin 5mg daily, if INR < 2 restart bival. INR 3.4 on 9/25, warfarin stopped, repeat INR 2.8 9/26 and warfarin restarted. On 9/26, increased PO oxycodone and discontinued IV dilaudid in anticipation for discharge. Overnight on 9/29, brain attack called out of concern for change in mental status, no focal deficits noted, BAT cancelled and narcan administered x3 with improvement of mental status. Mentation improved as of 10/2, small dose oxy resumed. INR supratherapeutic 10/3 at 6.7, 5mg IV Vit K x1 given (ok per pharmacy) I/s/o ?bleeding from vagina vs wound excoriation and UA with +blood. INR  down to 1.5 (10/4). Plan for RBCEx prior to dc per Dr. Whaley, planned 10/8. 10/5 started heparin drip (per attending) given normal INR while pending apheresis line placement (previous HIT workup with negative PF4). 10/7 started bridge back to coumadin with lovenox after apheresis line. Continue therapeutic Lovenox 120mg BID with Coumadin 5mg nightly x5 days and until INR is in therapeutic range x24 hours. 10/5 ordered duplex BLE as pt c/o severe LLE pain and swelling which was negative. Left ankle x-ray (10/8) negative for acute fractures, showed soft tissue edema. CT of left ankle (10/13) with diffuse nonspecific edema without fluid collection, may represent vascular congestion, no acute osseous abnormalities, no AVN. Uric acid elevated to 7.9, started colchicine 10/15 for acute gout flare. 10/15, noted  hardening of left breast and skin changes, after discussion with attending and Dr. Whaley US Bl breast and mammogram ordered. Mammogram and US negative for acute abnormalities. On 10/16, pt c/o increased numbness and decreased sensation to plantar side of left foot most likely from nerve impingement I/s/o increased LE edema with no change in ankle pain since starting colchicine. 10/16, Colchicine was discontinued and indomethacin was started BID for gout pain. DC to SNF pending improvement of ankle pain.     # Left LE pain   # left plantar toes and arch numbness   # acute gout   - 10/5 ordered duplex BLE as pt c/o severe LLE pain and swelling which was negative  - Left ankle x-ray (10/8) negative for acute fractures, showed soft tissue edema  - CT of left ankle (10/13) with diffuse nonspecific edema without fluid collection, may represent vascular congestion, no acute osseous abnormalities, no AVN  - Uric acid elevated to 7.9 (10/15)   - s/p 1.2 mg loading dose of colchicine 10/15 and 0.6mg colchicine 10/16 AM without improvement of pain, per UTD and pharmacy, best to start colchicine 24-36 hours with initial flare, since pain started earlier this week, difficult to assess if colchicine will improve symptoms   - 10/16 c/o Left plantar side of toes and arch of foot with numbness/tingling. Decreased sensation reported in AM. All other neuro assessment WNL c/f nerve impingement from increased swelling in leg earlier this week   - stopped colchicine 10/16 and started 75mg indomethacin BID for gout pain and new concerns for tarsal tunnel syndrome   - Fructosamine pending 10/16 I/s/p family history of DM and decrease sensation/ numbness on plantar side of left foot. A1C to r/o DM will not be accurate I/s/o sickle cell disease   - s/p additional 40mg IV lasix  10/15, 10/16, and additional 20mg IV lasix given 10/17 per attending physician   - 10/17, pain is improving and more localized to left lateral malleolus and bunion,  will continue treatment as above and if symptoms continue to improve can be discharged 10/18 or 10/19 pending precert   - PT to re-evaluate 10/16, OT to see pt on 10/17 for precert re-submission     # Left breast skin changes   - Noted hardening and skin changes to left breast on 10/15 around site of previous infection. Non-erythematous, no purulence (see below)   - After discussion with Dr. Whaley on 10/15, he recommended obtaining US   - Breast US limited left and Bl mammogram complete 10/16; Marked left breast diffuse edema, suggestive of vaso-occlusive etiology. Clinical correlation and management till resolution is  recommended. No mammographic or targeted sonographic evidence of malignancy.    # Waxing and waning AMS---Resolved   - Felt to be due to opioids i/s/o worsening renal function and c/b respiratory acidosis  - All opioids on hold (9/28-10/2)  - 10/2 resumed oxycodone 10mg q3 hours as AMS now resolved   - continue to hold extended release oxycontin   - Keep supplemental O2 on board, but titrate to maintain SPO2 of 90-92%   - On CPAP at home, enforce nightly use to extent possible; may need BiPAP if unresolved    # CHARLES on CKD II, improving   - Baseline ~ SCr <2, peak 5.4 this admission. 9/21 sCr 1.58 --> 1.13 (9/25)--> 1.14 (10/3)--> 0.96 (10/6) -->  1.01 (10/14) --> 0.99 (10/15) --> 1.07 (10/16) --> 1.39 (10/17)   - Recent increase in sCr most likely I/s/o IV lasix given (10/14-10/17), sCr still below BL as of 10/17   - Likely pre-renal vs ATN given persistant hypotension over admission, on CKD II, now polyuric phase  - FeNA 0.4%  - Severe hyperkalemia with previous peaked T waves on EKG, resolved  - Nephrology following while in ICU, now signed off   - s/p multiple K cocktails in MICU  - Encourage increased PO intake, strict I&Os Q6hrs      # Purpuric rash, suspected fixed drug eruption  # Concern for possible heparin-induced thrombocytopenia (HIT) -resolved  - Purple, reticular pattern of plaques noted  around the bilateral breasts, scalp and groin,  with tense bullae. Non-erythematous, no purulence  - Some initial concern for possible heparin-induced thrombocytopenia with concurrent skin findings. Discontinued heparin 9/16 and transitioned to bivalirudin  - 4 T score 0, PF4 w/ reflex BINA negative. Hematology ultimately consulted, felt there was low concern for HIT and signed off  - Differential diagnosis includes purpura secondary to infection versus coagulopathy versus vasculopathy versus small and/or medium vessel vasculitis versus calciphylaxis vs drug rash  - Dermatology consulted, work-up to date:  - Low Protein C activity  - ANCA, PR3 and MPO negative  - ISABELLE neg  - cryoglobulin labs- not enough specimen to analyze  - S/p skin punch biopsies x 2 9/15 and one on 9/18, showed SUPERFICIAL EPIDERMAL DEGENERATION WITH ERYTHROCYTE EXTRAVASATION WITH NEUTROPHILS. These findings could be seen secondary to an older trauma, or resolving erythema multiforme, a fixed drug eruption, Edgar-Christopher syndrome, or toxic epidermal necrolysis. Favor Multifocal Fixed Drug Eruption    - Dermatology contacted (9/20) about results, recommended adding vancomycin to allergy list and starting triamcinolone  - On 9/25, concern from pt and nursing that rash is not improving. Under breasts and groin still causing pain. Bilateral upper thighs and forehead rash improving   - Wound care re-consulted 9/25   - Re-engaged dermatology on 9/25 given worsening rash; recommended to apply vasoline to all eroded/denuded areas and continue triamcinolone cream to both the black plaques as well as the red areas across the trunk and thighs. 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     # Leukocytosis-- resolved   - Likely I/s/o rash vs reactive vs infectious process  - Leukocytosis noted on admission to MICU (WBC 24.8) --> 12.7 (9/25) --> 8.3 (10/3) -->  as of 10/14, leukocytosis continues to be resolved   - Blood cultures 9/10, 9/12, 9/15 all NGTD  - Strep and Legionella Ag, MRSA nares negative  - CXR (9/21) largely unremarkable, again re-demonstrated perihilar prominence seen on prior XR  - Abdominal xray (9/21) unremarkable  - Repeat blood cultures x 2 (9/21) NGTD  - UA (9/21) with 500 LE  - UA (9/30) +bacteria, +leuks, urine culture (9/29) mult organisms  - UA sent 10/3 with +blood, leuks, and WBC- not reflex to cx so UCX added on to 10/3 UA and again showed multiple organisms  - Will hold off tx for UTI as pt asymptomatic and leukocytosis improved. 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  # ACS  - Previously managed through routine RBC exchanges q6 weeks, most recent RBCEx 3/1/24, per patient pausing on transfusions for time being  - OARRS reviewed, no aberrant behavior noted  - LDH>12,000 (now downtrending), haptoglobin <30, fibrinogen 318, retic % 5.2 on presentation, bili 10.7  - Leukocytosis 24.8 on admission to MICU  - CXR (9/10) persistent atelectasis/scarring in the left lower lung  - CT CAP (9/11) Diffuse mosaic attenuation of the lung parenchyma, which can be seen in the setting of small airway disease, pulmonary edema or acute infection  - Intubated upon arrival to MICU, now s/p extubation 9/16  - s/p Vanc and Zosyn (finished both courses prior to floor transfer)  - Procal elevated 7.09 (9/11)  - Blood cultures 9/10, 9/12, 9/15 all NGTD  - Strep and Legionella Ag, MRSA nares negative  - Trop peak 1431, cards rec treat as ACS (see below)  - S/p 2u pRBCs 9/10 on arrival to MICU  - S/p exchange transfusion 9/11 AM for ACS  - S/p routine exchange transfusion 10/8 per Dr. Whaley   - Care plan from 12/6/23- For mild to moderate pain: Dilaudid 0.5mg IVP q3h as needed, for moderate to severe pain Dilaudid 1- 1.5mg q3h PRN  - On arrival to [facility], started on 0.6mg dilaudid IVP q3h PRN severe pain   - s/p 0.6mg dilaudid IVP q4h PRN  breakthrough and 15mg PO oxycodone Q4 hrs severe pain (9/25)  - Per discussion with Dr. Whaley (9/25), okay to increase PO oxycodone to 15-20mg Q4hr to optimize pain for discharge to SNF.   - On 9/26, discontinued IV dilaudid and increased to 20mg PO oxycodone q4hrs for severe pain   - Opioids on hold as of 9/28 d/t AMS- resumed 2.5mg oxy q4hrs PRN severe pain on 10/2. ER oxy 10mg BID still held  - (10/5- current) increased pain reg as AMS resolved--> continue oxycodone 10mg q3hrs PRN severe pain  - Continue to hold ER Oxycontin indefinitely as was major contributor to AMS   - Bowel regimen for opioid induced constipation with Senna 2tabs BID, Miralax BID, and dulcolax suppository 10mg PRN constipation  - Zofran for opioid induced nausea, no benadryl ordered (assumed from previous AMS but also no reports of opioid-induced pruritus)  - Continue home Folic Acid 1mg daily, continue thiamine 100mg daily  - Removed femoral line 10/15      # ST depression with tropinemia, suspected 2/2 demand ischemia iso ACS  # Hx SVT  - Elevated troponins on admission, peak 1431 and now downtrending   - EKG with ST depressions, likely Type II MI due to vaso-occlusive crisis and anemia  - TTE 9/11: EF 60-65%, RV enlarged and reduced in function which appears stable when compared to last TTE (1/2024)  - Troponin leak is likely due to cardiac demand vs supply mismatch in the setting of worsening anemia and vaso-occlusive crisis  - Cardiology consulted, now signed off with indication to treat troponemia as ACS  - Lasix held in MICU 2/2 hypotension and CHARLES, can resume as able   - Metop 12.5mg BID held in MICU 2/2 hypotension- BP and HR continue to remain stable, holding off on restarting for now  - s/p 1x dose of po lasix 20mg on 10/9 and s/p IVP 20mg lasix on 10/10 for volume overload, plan to resume home dose lasix once LE edema back to patient's baseline   - home dose of laix of 20mg po every other day resumed on 10/12  - 10/14 s/p 1x dose  of IVP lasix for increased LE edema   - 10/15 and 10/16 s/p 1x dose of 40mg IVP lasix for increased LE edema   - 10/17 s/p 20mg IV lasix per attending physician Dr. Hensley with evening RFP to assess sCr   - Follow-up with Cardiology outpt, FUV 2/13/25     # Acute liver injury, improving  # Direct hyperbilirubinuria, improving  - Suspected 2/2 hemodynamic instability versus sickle cell crisis  [facility] T bili 10.2, INR 3.0; continues to improve  - CT with hepatic venous congestion, patient lacks gallbladder  - Liver US with Doppler: Diffuse fatty infiltration, unremarkable doppler interrogation of the liver  - EBV IgG positive, IgM negative  - CMV IgG positive, PCR negative  - Acute hepatitis panel unremarkable  - Hepatology initially following, now signed off  - Will continue to monitor, trend daily CMP     # pHTN   - Initial c/f CTEPH as imaging findings with dilated PA, filling defects, and mosaicism  - VQ scan (6/13/23) with non anatomical basal defects and RUL defect due to scar--> not favoring CTEPH per Dr. Yi and Dr. 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 On lifelong anticoagulation, DOAC discouraged due to high risk of clotting   - Home Coumadin 5mg daily initially HELD on admission to MICU 2/2 unstable course  -  Restarted coumadin 9/18 PM, will plan to bridge with bival, Ok'd by heme   - 9/20 INR 3.8, bival and home warfarin held. Pharmacy rec repeat INR 4 hours after bival was held. If the INR < 2, we can restart the bival, but if the INR 2-3, we can continue with warfarin monotherapy   - 9/20 repeat INR 3.7, pharmacy rec continuing to hold both medications  - 9/21 INR 3.2, per pharmacy - get repeat INR in AM 9/22, if INR between 2-3 can restart home warfarin 5mg daily  -INR 2.6 (9/24) after restarting Warfarin 5mg, will maintain INR goal of 2-3  - INR 3.4 (9/25). Held warfarin on 9/25. Repeat INR 9/26. Plan to restart if <3   - INR 2.8 (9/26), restarted warfarin, recheck INR 9/27   - INR 4.0 (9/30), held warfarin, repeat INR 10/1 ordered, plan to restart if <3  - INR 6.8 (10/1), patient without any active bleeding, per attending and pharmacy, warfarin held   - INR 6.8 (10/2), patient without any active bleeding, per attending and pharmacy, warfarin held   - INR 6.7 (10/3), cont to hold warfarin. Ordered 5mg IV Vit K x1 (ok per pharmacy) d/t ?bleeding, vaginal vs wound excoriation and UA +blood  - 10/4 INR normalized to 1.6, continue to hold warfarin  - 10/5 pt c/o severe LLE pain and swelling; duplex BLE negative  - 10/5 INR 1.2; started heparin drip given normal INR while pending apheresis line placement (previous HIT workup with negative PF4)--> held 10/7 at 0400 for apheresis line placement  - 10/7 INR 1.2--> started bridge back to coumadin tonight with lovenox. Started after apheresis line placement will start coumadin 5mg and start therapeutic lovenox 120mg BID x5 days AND until INR therepeutic x24 hours (regimen discussed with pharmacy)  - 10/10 INR 1.6   - Home coumadin 5mg daily dose resumed 10/12  - 10/13 INR 1.9   - INR remains therapeutic daily since 10/14   - Follows with Coumadin clinic outpatient  - Plan to consult vascular medicine if INR is not therapeutic on home regimen    # CAN  - cont home CPAP   -  cont home Albuterol PRN     # H/O Cauda Equine syndrome  - Follows Dr. Delacruz as outpatient  - no current issues      # DISPO  - Full code- confirmed on admission  - NOK: Tati Salgado (mother) (#[phone]); updated @ bedside and discussed dc plan on 10/17   - PT/OT rec SNF, plan for rehab at [facility]  - DC to SNF pending pre-cert re-approval; most likely 10/18 or 10/19   - Derm FUV 11/7, Sickle Cell FUV 11/18, Cardiology FUV 2/13

## 2024-10-17 NOTE — PROGRESS NOTES
Senait Narvaez is a 54 y.o. female on day 37 of admission presenting with Sickle cell disease with crisis and other complication.    Subjective   Seen at bedside this morning. Compared to yesterday, pain in L ankle is more localized to ankle bone and bunion area. Yesterday pt has tenderness along calf and diffusely across foot. We discussed that pain regimen is working based on improvement of symptoms this morning. Right leg is still edematous but has improved with IV lasix, no longer tender or painful. Plantar numbness and tingling still present from toes to arch of foot, unchanged since yesterday but sensation is slightly better. Encouraged continued use of ice packs. Discussed results from breast US/ mammogram with patient and mom. Mom was present at bedside and was updated that discharge will most likely be ~Saturday if symptoms continue to improve. She is eating and drinking well and having bowel movements, LBM 10/13. Stated she will take her senna and miralax this morning. Still on 1L O2. Otherwise denies shortness of breath, chest pain, abdominal pain, N/V/D, F/C, H/A.     Objective     Physical Exam  Vitals reviewed.   Constitutional:       Appearance: Normal appearance. She is obese.   HENT:      Head: Normocephalic and atraumatic.      Nose: Nose normal.      Mouth/Throat:      Mouth: Mucous membranes are moist.      Pharynx: Oropharynx is clear.   Eyes:      Extraocular Movements: Extraocular movements intact.      Pupils: Pupils are equal, round, and reactive to light.   Cardiovascular:      Rate and Rhythm: Normal rate and regular rhythm.      Pulses: Normal pulses.      Heart sounds: Normal heart sounds.   Pulmonary:      Effort: Pulmonary effort is normal.      Breath sounds: Normal breath sounds.      Comments: 1-2L NC   Chest:   Breasts:     Right: Swelling and skin change present.      Left: Swelling and skin change present.   Abdominal:      General: Bowel sounds are normal.      Palpations: Abdomen  "is soft.   Musculoskeletal:         General: Normal range of motion.      Right lower le+ Pitting Edema present.      Left ankle: Tenderness present over the lateral malleolus.      Left foot: Bunion (tenderness) present.   Skin:     General: Skin is warm.      Comments: Dry and resolving rash in inguinal area as well as chest wall     Neurological:      General: No focal deficit present.      Mental Status: She is alert and oriented to person, place, and time. Mental status is at baseline.      Cranial Nerves: Cranial nerves 2-12 are intact.      Sensory: Sensory deficit present.      Motor: Motor function is intact.      Coordination: Coordination is intact.      Comments: Left plantar side of toes and arch of foot with numbness/tingling. Decreased sensation     Psychiatric:         Mood and Affect: Mood normal.         Behavior: Behavior normal.         Last Recorded Vitals  Blood pressure 111/69, pulse 80, temperature 36.3 °C (97.3 °F), temperature source Temporal, resp. rate 18, height 1.651 m (5' 5\"), weight 113 kg (248 lb 7.3 oz), SpO2 95%.  Intake/Output last 3 Shifts:  I/O last 3 completed shifts:  In: - (0 mL/kg)   Out: 2775 (24.6 mL/kg) [Urine:2775 (0.7 mL/kg/hr)]  Weight: 112.7 kg     Relevant Results  Scheduled medications  folic acid, 1 mg, oral, Daily  furosemide, 20 mg, intravenous, Once  furosemide, 20 mg, oral, Every other day  indomethacin SR, 75 mg, oral, BID  [Held by provider] metoprolol tartrate, 12.5 mg, oral, BID  nystatin, 1 Application, Topical, BID  oxygen, , inhalation, Continuous - Inhalation  pantoprazole, 40 mg, oral, Daily before breakfast  polyethylene glycol, 17 g, oral, BID  sennosides, 2 tablet, oral, BID  thiamine, 100 mg, oral, Daily  triamcinolone, , Topical, BID  warfarin, 5 mg, oral, Daily  white petrolatum, , Topical, BID      Continuous medications     PRN medications  PRN medications: albuterol, alteplase, benzocaine-menthol, bisacodyl, dextrose, dextrose, " diphenhydrAMINE, glucagon, glucagon, guaiFENesin, naloxone, ondansetron, oxyCODONE, oxyCODONE, oxygen, sodium chloride    BI mammo bilateral diagnostic tomosynthesis    Result Date: 10/16/2024  Interpreted By:  Debbie Magallon and Liller Gregory STUDY: BI MAMMO BILATERAL DIAGNOSTIC TOMOSYNTHESIS; BI US BREAST LIMITED LEFT;  10/16/2024 10:21 am; 10/16/2024 10:59 am   ACCESSION NUMBER(S): [id number]; [id number]   ORDERING CLINICIAN: ANGELA MEJIA   INDICATION: Patient presents with skin hardening and discoloration of the left breast. History of sickle cell disease   COMPARISON: Mammogram 08/16/2023, 01/18/2021, 12/30/2019, breast ultrasound 11/27/2023.   FINDINGS: MAMMOGRAPHY: 2D and tomosynthesis images were reviewed at 1 mm slice thickness.   Density:  There are scattered areas of fibroglandular density.   There is marked asymmetric diffuse left breast skin thickening and trabecular thickening. No suspicious masses or calcifications are identified in either breast. A slightly prominent lymph node is again seen in the lower left axilla, similar compared to previous mammograms.   ULTRASOUND: Targeted ultrasound was performed of the entire left breast by a registered sonographer with elastography. There is diffuse skin thickening of the left breast measuring up to 0.7 cm and diffuse parenchymal edema, correlating with mammographic findings. No focal abnormality is identified on the ultrasound examination.       1. Marked left breast diffuse edema, suggestive of vaso-occlusive etiology. Clinical correlation and management till resolution is recommended. 2. No mammographic or targeted sonographic evidence of malignancy.   BI-RADS CATEGORY: BI-RADS Category:  2 Benign. Recommendation:  Clinical follow-up. Recommended Date:  Immediate. Laterality:  Left.   For any future breast imaging appointments, please call [phone]).   I personally reviewed the images/study and I agree with the findings as  stated above by resident physician, Dr. Quan Bergman.   MACRO: None   Signed by: Debbie Magallon 10/16/2024 11:41 AM Dictation workstation:   [id number]    BI US breast limited left    Result Date: 10/16/2024  Interpreted By:  Debbie Magallon and Liller Gregory STUDY: BI MAMMO BILATERAL DIAGNOSTIC TOMOSYNTHESIS; BI US BREAST LIMITED LEFT;  10/16/2024 10:21 am; 10/16/2024 10:59 am   ACCESSION NUMBER(S): [id number]; [id number]   ORDERING CLINICIAN: ANGELA MEJIA   INDICATION: Patient presents with skin hardening and discoloration of the left breast. History of sickle cell disease   COMPARISON: Mammogram 08/16/2023, 01/18/2021, 12/30/2019, breast ultrasound 11/27/2023.   FINDINGS: MAMMOGRAPHY: 2D and tomosynthesis images were reviewed at 1 mm slice thickness.   Density:  There are scattered areas of fibroglandular density.   There is marked asymmetric diffuse left breast skin thickening and trabecular thickening. No suspicious masses or calcifications are identified in either breast. A slightly prominent lymph node is again seen in the lower left axilla, similar compared to previous mammograms.   ULTRASOUND: Targeted ultrasound was performed of the entire left breast by a registered sonographer with elastography. There is diffuse skin thickening of the left breast measuring up to 0.7 cm and diffuse parenchymal edema, correlating with mammographic findings. No focal abnormality is identified on the ultrasound examination.       1. Marked left breast diffuse edema, suggestive of vaso-occlusive etiology. Clinical correlation and management till resolution is recommended. 2. No mammographic or targeted sonographic evidence of malignancy.   BI-RADS CATEGORY: BI-RADS Category:  2 Benign. Recommendation:  Clinical follow-up. Recommended Date:  Immediate. Laterality:  Left.   For any future breast imaging appointments, please call [phone]).   I personally reviewed the images/study and I  agree with the findings as stated above by resident physician, Dr. Quan Bergman.   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 Patient was subsequently intubated due to worsening lethargic and lack of respiratory compensation from significant metabolic acidosis with concern for ACS. Vancomycin and Zosyn started for empiric coverage, patient also started on pressors for BP support. Patient was transfused 2 units pRBCs and then underwent RBCEx on 9/11. Cardiology consulted for c/f NSTEMI vs demand ischemia (ST depressions and elevated troponin), rec treat as ACS. Course further c/b severe CHARLES with peak sCr of 5.4, and acute liver injury with severely elevated liver enzymes (ALT 4119, AST >10k). Liver US only remarkable for fatty infiltration, viral hepatitis panel negative. CHARLES and liver injury improved with supportive management. Pruritic rash noted 9/14, Derm consulted and took multiple biopsies 9/15. Rash then began to worsen to involve the groin, lateral thigh, and scalp with numerous tense bullae. Discontinued heparin given concerns for potential HIT, started bivalirudin. However PF4 was negative. Patient was extubated on 9/16/2024. Started stress dose steroids given continued pressor requirements, pressors Dc'd 9/17.      Patient transferred to [facility] 9/17, PT/OT rec SNF. Coumadin bridge was started 9/18. Given persistent  rash with unremarkable labs, derm re-biopsied on 9/18, findings ultimately felt to be most c/w fixed drug eruption. Dermatology contacted for bx results (9/20) rec triamcinolone cream BID and interdry cloths. Leukocytosis uptrending 9/21, repeat infectious workup negative (CXR, Abd XR, blood cultures, MRSA negative 9/21). UA with leuks. On 9/25, per nursing and patient, rash seems to be worsening. Derm re-engaged recommended to apply vasoline to all eroded/denuded areas and continue triamcinolone cream to both the black plaques as well as the red areas across the trunk and thighs. 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 Wound care following. For warfarin dosing, per pharmacy, if INR between 2-3 can restart home warfarin 5mg daily, if INR < 2 restart bival. INR 3.4 on 9/25, warfarin stopped, repeat INR 2.8 9/26 and warfarin restarted. On 9/26, increased PO oxycodone and discontinued IV dilaudid in anticipation for discharge. Overnight on 9/29, brain attack called out of concern for change in mental status, no focal deficits noted, BAT cancelled and narcan administered x3 with improvement of mental status. Mentation improved as of 10/2, small dose oxy resumed. INR supratherapeutic 10/3 at 6.7, 5mg IV Vit K x1 given (ok per pharmacy) I/s/o ?bleeding from vagina vs wound excoriation and UA with +blood. INR  down to 1.5 (10/4). Plan for RBCEx prior to dc per Dr. Whaley, planned 10/8. 10/5 started heparin drip (per attending) given normal INR while pending apheresis line placement (previous HIT workup with negative PF4). 10/7 started bridge back to coumadin with lovenox after apheresis line. Continue therapeutic Lovenox 120mg BID with Coumadin 5mg nightly x5 days and until INR is in therapeutic range x24 hours. 10/5 ordered duplex BLE as pt c/o severe LLE pain and swelling which was negative. Left ankle x-ray (10/8)  negative for acute fractures, showed soft tissue edema. CT of left ankle (10/13) with diffuse nonspecific edema without fluid collection, may represent vascular congestion, no acute osseous abnormalities, no AVN. Uric acid elevated to 7.9, started colchicine 10/15 for acute gout flare. 10/15, noted hardening of left breast and skin changes, after discussion with attending and Dr. Whaley US Bl breast and mammogram ordered. Mammogram and US negative for acute abnormalities. On 10/16, pt c/o increased numbness and decreased sensation to plantar side of left foot most likely from nerve impingement I/s/o increased LE edema with no change in ankle pain since starting colchicine. 10/16, Colchicine was discontinued and indomethacin was started BID for gout pain. DC to SNF pending improvement of ankle pain.     # Left LE pain   # left plantar toes and arch numbness   # acute gout   - 10/5 ordered duplex BLE as pt c/o severe LLE pain and swelling which was negative  - Left ankle x-ray (10/8) negative for acute fractures, showed soft tissue edema  - CT of left ankle (10/13) with diffuse nonspecific edema without fluid collection, may represent vascular congestion, no acute osseous abnormalities, no AVN  - Uric acid elevated to 7.9 (10/15)   - s/p 1.2 mg loading dose of colchicine 10/15 and 0.6mg colchicine 10/16 AM without improvement of pain, per UTD and pharmacy, best to start colchicine 24-36 hours with initial flare, since pain started earlier this week, difficult to assess if colchicine will improve symptoms   - 10/16 c/o Left plantar side of toes and arch of foot with numbness/tingling. Decreased sensation reported in AM. All other neuro assessment WNL c/f nerve impingement from increased swelling in leg earlier this week   - stopped colchicine 10/16 and started 75mg indomethacin BID for gout pain and new concerns for tarsal tunnel syndrome   - Fructosamine pending 10/16 I/s/p family history of DM and decrease sensation/  numbness on plantar side of left foot. A1C to r/o DM will not be accurate I/s/o sickle cell disease   - s/p additional 40mg IV lasix  10/15, 10/16, and additional 20mg IV lasix given 10/17 per attending physician   - 10/17, pain is improving and more localized to left lateral malleolus and bunion, will continue treatment as above and if symptoms continue to improve can be discharged 10/18 or 10/19 pending precert   - PT to re-evaluate 10/16, OT to see pt on 10/17 for precert re-submission     # Left breast skin changes   - Noted hardening and skin changes to left breast on 10/15 around site of previous infection. Non-erythematous, no purulence (see below)   - After discussion with Dr. Whaley on 10/15, he recommended obtaining US   - Breast US limited left and Bl mammogram complete 10/16; Marked left breast diffuse edema, suggestive of vaso-occlusive etiology. Clinical correlation and management till resolution is  recommended. No mammographic or targeted sonographic evidence of malignancy.    # Waxing and waning AMS---Resolved   - Felt to be due to opioids i/s/o worsening renal function and c/b respiratory acidosis  - All opioids on hold (9/28-10/2)  - 10/2 resumed oxycodone 10mg q3 hours as AMS now resolved   - continue to hold extended release oxycontin   - Keep supplemental O2 on board, but titrate to maintain SPO2 of 90-92%   - On CPAP at home, enforce nightly use to extent possible; may need BiPAP if unresolved    # CHARLES on CKD II, improving   - Baseline ~ SCr <2, peak 5.4 this admission. 9/21 sCr 1.58 --> 1.13 (9/25)--> 1.14 (10/3)--> 0.96 (10/6) -->  1.01 (10/14) --> 0.99 (10/15) --> 1.07 (10/16) --> 1.39 (10/17)   - Recent increase in sCr most likely I/s/o IV lasix given (10/14-10/17), sCr still below BL as of 10/17   - Likely pre-renal vs ATN given persistant hypotension over admission, on CKD II, now polyuric phase  - FeNA 0.4%  - Severe hyperkalemia with previous peaked T waves on EKG, resolved  - Nephrology  following while in ICU, now signed off   - s/p multiple K cocktails in MICU  - Encourage increased PO intake, strict I&Os Q6hrs      # Purpuric rash, suspected fixed drug eruption  # Concern for possible heparin-induced thrombocytopenia (HIT) -resolved  - Purple, reticular pattern of plaques noted around the bilateral breasts, scalp and groin,  with tense bullae. Non-erythematous, no purulence  - Some initial concern for possible heparin-induced thrombocytopenia with concurrent skin findings. Discontinued heparin 9/16 and transitioned to bivalirudin  - 4 T score 0, PF4 w/ reflex BINA negative. Hematology ultimately consulted, felt there was low concern for HIT and signed off  - Differential diagnosis includes purpura secondary to infection versus coagulopathy versus vasculopathy versus small and/or medium vessel vasculitis versus calciphylaxis vs drug rash  - Dermatology consulted, work-up to date:  - Low Protein C activity  - ANCA, PR3 and MPO negative  - ISABELLE neg  - cryoglobulin labs- not enough specimen to analyze  - S/p skin punch biopsies x 2 9/15 and one on 9/18, showed SUPERFICIAL EPIDERMAL DEGENERATION WITH ERYTHROCYTE EXTRAVASATION WITH NEUTROPHILS. These findings could be seen secondary to an older trauma, or resolving erythema multiforme, a fixed drug eruption, Edgar-Christopher syndrome, or toxic epidermal necrolysis. Favor Multifocal Fixed Drug Eruption    - Dermatology contacted (9/20) about results, recommended adding vancomycin to allergy list and starting triamcinolone  - On 9/25, concern from pt and nursing that rash is not improving. Under breasts and groin still causing pain. Bilateral upper thighs and forehead rash improving   - Wound care re-consulted 9/25   - Re-engaged dermatology on 9/25 given worsening rash; recommended to apply vasoline to all eroded/denuded areas and continue triamcinolone cream to both the black plaques as well as the red areas across the trunk and thighs. Per dermatology,  erythema is mild and not palpable, it is most likely part of the drug eruption the patient has previously been known to have.     # Leukocytosis-- resolved   - Likely I/s/o rash vs reactive vs infectious process  - Leukocytosis noted on admission to MICU (WBC 24.8) --> 12.7 (9/25) --> 8.3 (10/3) --> as of 10/14, leukocytosis continues to be resolved   - Blood cultures 9/10, 9/12, 9/15 all NGTD  - Strep and Legionella Ag, MRSA nares negative  - CXR (9/21) largely unremarkable, again re-demonstrated perihilar prominence seen on prior XR  - Abdominal xray (9/21) unremarkable  - Repeat blood cultures x 2 (9/21) NGTD  - UA (9/21) with 500 LE  - UA (9/30) +bacteria, +leuks, urine culture (9/29) mult organisms  - UA sent 10/3 with +blood, leuks, and WBC- not reflex to cx so UCX added on to 10/3 UA and again showed multiple organisms  - Will hold off tx for UTI as pt asymptomatic and leukocytosis improved. 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  # ACS  - Previously managed through routine RBC exchanges q6 weeks, most recent RBCEx 3/1/24, per patient pausing on transfusions for time being  - OARRS reviewed, no aberrant behavior noted  - LDH>12,000 (now downtrending), haptoglobin <30, fibrinogen 318, retic % 5.2 on presentation, bili 10.7  - Leukocytosis 24.8 on admission to MICU  - CXR (9/10) persistent atelectasis/scarring in the left lower lung  - CT CAP (9/11) Diffuse mosaic attenuation of the lung parenchyma, which can be seen in the setting of small airway disease, pulmonary edema or acute infection  - Intubated upon arrival to MICU, now s/p extubation 9/16  - s/p Vanc and Zosyn (finished both courses prior to floor transfer)  - Procal elevated 7.09 (9/11)  - Blood cultures 9/10, 9/12, 9/15 all NGTD  - Strep and Legionella Ag, MRSA nares negative  - Trop peak 1431, cards rec treat as ACS (see below)  - S/p 2u pRBCs 9/10 on arrival to MICU  - S/p exchange transfusion 9/11 AM for ACS  - S/p  routine exchange transfusion 10/8 per Dr. Whaley   - Care plan from 12/6/23- For mild to moderate pain: Dilaudid 0.5mg IVP q3h as needed, for moderate to severe pain Dilaudid 1- 1.5mg q3h PRN  - On arrival to [facility], started on 0.6mg dilaudid IVP q3h PRN severe pain   - s/p 0.6mg dilaudid IVP q4h PRN breakthrough and 15mg PO oxycodone Q4 hrs severe pain (9/25)  - Per discussion with Dr. Whaley (9/25), okay to increase PO oxycodone to 15-20mg Q4hr to optimize pain for discharge to SNF.   - On 9/26, discontinued IV dilaudid and increased to 20mg PO oxycodone q4hrs for severe pain   - Opioids on hold as of 9/28 d/t AMS- resumed 2.5mg oxy q4hrs PRN severe pain on 10/2. ER oxy 10mg BID still held  - (10/5- current) increased pain reg as AMS resolved--> continue oxycodone 10mg q3hrs PRN severe pain  - Continue to hold ER Oxycontin indefinitely as was major contributor to AMS   - Bowel regimen for opioid induced constipation with Senna 2tabs BID, Miralax BID, and dulcolax suppository 10mg PRN constipation  - Zofran for opioid induced nausea, no benadryl ordered (assumed from previous AMS but also no reports of opioid-induced pruritus)  - Continue home Folic Acid 1mg daily, continue thiamine 100mg daily  - Removed femoral line 10/15      # ST depression with tropinemia, suspected 2/2 demand ischemia iso ACS  # Hx SVT  - Elevated troponins on admission, peak 1431 and now downtrending   - EKG with ST depressions, likely Type II MI due to vaso-occlusive crisis and anemia  - TTE 9/11: EF 60-65%, RV enlarged and reduced in function which appears stable when compared to last TTE (1/2024)  - Troponin leak is likely due to cardiac demand vs supply mismatch in the setting of worsening anemia and vaso-occlusive crisis  - Cardiology consulted, now signed off with indication to treat troponemia as ACS  - Lasix held in MICU 2/2 hypotension and CHARLES, can resume as able   - Metop 12.5mg BID held in MICU 2/2 hypotension- BP and HR continue to  remain stable, holding off on restarting for now  - s/p 1x dose of po lasix 20mg on 10/9 and s/p IVP 20mg lasix on 10/10 for volume overload, plan to resume home dose lasix once LE edema back to patient's baseline   - home dose of laix of 20mg po every other day resumed on 10/12  - 10/14 s/p 1x dose of IVP lasix for increased LE edema   - 10/15 and 10/16 s/p 1x dose of 40mg IVP lasix for increased LE edema   - 10/17 s/p 20mg IV lasix per attending physician Dr. Hensley with evening RFP to assess sCr   - Follow-up with Cardiology outpt, FUV 2/13/25     # Acute liver injury, improving  # Direct hyperbilirubinuria, improving  - Suspected 2/2 hemodynamic instability versus sickle cell crisis  - On presentation to  MICU ALT 2611, AST 4727, T bili 10.2, INR 3.0; continues to improve  - CT with hepatic venous congestion, patient lacks gallbladder  - Liver US with Doppler: Diffuse fatty infiltration, unremarkable doppler interrogation of the liver  - EBV IgG positive, IgM negative  - CMV IgG positive, PCR negative  - Acute hepatitis panel unremarkable  - Hepatology initially following, now signed off  - Will continue to monitor, trend daily CMP     # pHTN   - Initial c/f CTEPH as imaging findings with dilated PA, filling defects, and mosaicism  - VQ scan (6/13/23) with non anatomical basal defects and RUL defect due to scar--> not favoring CTEPH per Dr. Yi and Dr. 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 On lifelong anticoagulation, DOAC discouraged due to high risk of clotting   - Home Coumadin 5mg daily initially HELD on admission to MICU 2/2 unstable course  - Restarted coumadin 9/18 PM, will plan to bridge with bival, Ok'd by heme   - 9/20 INR 3.8, bival and home warfarin held. Pharmacy rec repeat INR 4 hours after bival was held. If the INR < 2, we can restart the bival, but if the INR 2-3, we can continue with warfarin monotherapy   - 9/20 repeat INR 3.7, pharmacy rec continuing to hold both medications  - 9/21 INR 3.2, per pharmacy - get repeat INR in AM 9/22, if INR between 2-3 can restart home warfarin 5mg daily  -INR 2.6 (9/24) after restarting Warfarin 5mg, will maintain INR goal of 2-3  - INR 3.4 (9/25). Held warfarin on 9/25. Repeat INR 9/26. Plan to restart if <3   - INR 2.8 (9/26), restarted warfarin, recheck INR 9/27   - INR 4.0 (9/30), held warfarin, repeat INR 10/1 ordered, plan to restart if <3  - INR 6.8 (10/1), patient without any active bleeding, per attending and pharmacy, warfarin held   - INR 6.8 (10/2), patient without any active bleeding, per attending and pharmacy, warfarin held   - INR 6.7 (10/3), cont to hold warfarin. Ordered 5mg IV Vit K x1 (ok per pharmacy) d/t ?bleeding, vaginal vs wound excoriation and UA +blood  - 10/4 INR normalized to 1.6, continue to hold warfarin  - 10/5 pt c/o severe LLE pain and swelling; duplex BLE negative  - 10/5 INR 1.2; started heparin drip given normal INR while pending apheresis line placement (previous HIT workup with negative PF4)--> held 10/7 at 0400 for apheresis line placement  - 10/7 INR 1.2--> started bridge back to coumadin tonight with lovenox. Started after apheresis line placement will start coumadin 5mg and start therapeutic lovenox 120mg BID x5 days AND until INR therepeutic x24 hours (regimen discussed with  pharmacy)  - 10/10 INR 1.6   - Home coumadin 5mg daily dose resumed 10/12  - 10/13 INR 1.9   - INR remains therapeutic daily since 10/14   - Follows with Coumadin clinic outpatient  - Plan to consult vascular medicine if INR is not therapeutic on home regimen    # CAN  - cont home CPAP   - cont home Albuterol PRN     # H/O Cauda Equine syndrome  - Follows Dr. Delacruz as outpatient  - no current issues      # DISPO  - Full code- confirmed on admission  - NOK: Tati Salgado (mother) (#[phone]); updated @ bedside and discussed dc plan on 10/17   - PT/OT rec SNF, plan for rehab at [facility]  - DC to SNF pending pre-cert re-approval; most likely 10/18 or 10/19   - Derm FUV 11/7, Sickle Cell FUV 11/18, Cardiology FUV 2/13      I spent >60 minutes in the professional and overall care of this patient.    Assessment and plan discussed with attending physician Dr. Shellie Kerns, APRN-CNP

## 2024-10-17 NOTE — PROGRESS NOTES
Art Therapy Note    Senait Narvaez     Therapy Session  Referral Type: New referral this admission  Visit Type: Follow-up visit  Session Start Time: 1442  Session End Time: 1444  Intervention Delivery: In-person  Conflict of Service: Declined treatment  Number of family members present: 0              Treatment/Interventions  Areas of Focus: Emotional support  Art Therapy Interventions: Empathic listening/validating emotions  Interruption: No  Patient Fell Asleep at End of Session: No    Post-assessment  Total Session Time (min): 2 minutes    Narrative  Assessment Detail: Pt declined servicws for the day stating she had received bad news about her health to and wnats time to processs what she heard.  ATR validated her feelings and encouraged her think about using art making to help process what she has heard about her health.  Pt agreeable to consider the suggestionadn asked to be seen at theend of the week .  Follow-up: ATR will follow up wt Pt later in the week to offer support in helping her use her art making to process her bad news told today.    Education Documentation  No documentation found.

## 2024-10-18 LAB
ALBUMIN SERPL BCP-MCNC: 2.3 G/DL (ref 3.4–5)
ALP SERPL-CCNC: 156 U/L (ref 33–110)
ALT SERPL W P-5'-P-CCNC: 14 U/L (ref 7–45)
ANION GAP SERPL CALC-SCNC: 12 MMOL/L (ref 10–20)
APPEARANCE UR: ABNORMAL
AST SERPL W P-5'-P-CCNC: 35 U/L (ref 9–39)
BACTERIA #/AREA URNS AUTO: ABNORMAL /HPF
BASOPHILS # BLD AUTO: 0.02 X10*3/UL (ref 0–0.1)
BASOPHILS NFR BLD AUTO: 0.3 %
BILIRUB SERPL-MCNC: 1.1 MG/DL (ref 0–1.2)
BILIRUB UR STRIP.AUTO-MCNC: NEGATIVE MG/DL
BUN SERPL-MCNC: 30 MG/DL (ref 6–23)
CALCIUM SERPL-MCNC: 8.9 MG/DL (ref 8.6–10.6)
CHLORIDE SERPL-SCNC: 92 MMOL/L (ref 98–107)
CO2 SERPL-SCNC: 35 MMOL/L (ref 21–32)
COLOR UR: YELLOW
CREAT SERPL-MCNC: 1.93 MG/DL (ref 0.5–1.05)
EGFRCR SERPLBLD CKD-EPI 2021: 30 ML/MIN/1.73M*2
EOSINOPHIL # BLD AUTO: 0.35 X10*3/UL (ref 0–0.7)
EOSINOPHIL NFR BLD AUTO: 4.8 %
ERYTHROCYTE [DISTWIDTH] IN BLOOD BY AUTOMATED COUNT: 18.1 % (ref 11.5–14.5)
GLUCOSE SERPL-MCNC: 67 MG/DL (ref 74–99)
GLUCOSE UR STRIP.AUTO-MCNC: NORMAL MG/DL
HCT VFR BLD AUTO: 28.3 % (ref 36–46)
HGB BLD-MCNC: 9.3 G/DL (ref 12–16)
HGB RETIC QN: 31 PG (ref 28–38)
HYALINE CASTS #/AREA URNS AUTO: ABNORMAL /LPF
IMM GRANULOCYTES # BLD AUTO: 0.05 X10*3/UL (ref 0–0.7)
IMM GRANULOCYTES NFR BLD AUTO: 0.7 % (ref 0–0.9)
IMMATURE RETIC FRACTION: 13 %
INR PPP: 3.6 (ref 0.9–1.1)
KETONES UR STRIP.AUTO-MCNC: NEGATIVE MG/DL
LDH SERPL L TO P-CCNC: 325 U/L (ref 84–246)
LEUKOCYTE ESTERASE UR QL STRIP.AUTO: NEGATIVE
LYMPHOCYTES # BLD AUTO: 1.11 X10*3/UL (ref 1.2–4.8)
LYMPHOCYTES NFR BLD AUTO: 15.1 %
MAGNESIUM SERPL-MCNC: 1.93 MG/DL (ref 1.6–2.4)
MCH RBC QN AUTO: 29.1 PG (ref 26–34)
MCHC RBC AUTO-ENTMCNC: 32.9 G/DL (ref 32–36)
MCV RBC AUTO: 88 FL (ref 80–100)
MONOCYTES # BLD AUTO: 0.66 X10*3/UL (ref 0.1–1)
MONOCYTES NFR BLD AUTO: 9 %
NEUTROPHILS # BLD AUTO: 5.14 X10*3/UL (ref 1.2–7.7)
NEUTROPHILS NFR BLD AUTO: 70.1 %
NITRITE UR QL STRIP.AUTO: NEGATIVE
NRBC BLD-RTO: 1 /100 WBCS (ref 0–0)
PH UR STRIP.AUTO: 5 [PH]
PLATELET # BLD AUTO: 375 X10*3/UL (ref 150–450)
POTASSIUM SERPL-SCNC: 4.6 MMOL/L (ref 3.5–5.3)
PROT SERPL-MCNC: 5.7 G/DL (ref 6.4–8.2)
PROT UR STRIP.AUTO-MCNC: NEGATIVE MG/DL
PROTHROMBIN TIME: 41.6 SECONDS (ref 9.8–12.8)
RBC # BLD AUTO: 3.2 X10*6/UL (ref 4–5.2)
RBC # UR STRIP.AUTO: ABNORMAL /UL
RBC #/AREA URNS AUTO: ABNORMAL /HPF
RETICS #: 0.16 X10*6/UL (ref 0.02–0.08)
RETICS/RBC NFR AUTO: 5 % (ref 0.5–2)
SODIUM SERPL-SCNC: 134 MMOL/L (ref 136–145)
SP GR UR STRIP.AUTO: 1.01
SQUAMOUS #/AREA URNS AUTO: ABNORMAL /HPF
UROBILINOGEN UR STRIP.AUTO-MCNC: NORMAL MG/DL
WBC # BLD AUTO: 7.3 X10*3/UL (ref 4.4–11.3)
WBC #/AREA URNS AUTO: ABNORMAL /HPF

## 2024-10-18 PROCEDURE — 83615 LACTATE (LD) (LDH) ENZYME: CPT

## 2024-10-18 PROCEDURE — 82436 ASSAY OF URINE CHLORIDE: CPT | Performed by: HOSPITALIST

## 2024-10-18 PROCEDURE — 81001 URINALYSIS AUTO W/SCOPE: CPT

## 2024-10-18 PROCEDURE — 99233 SBSQ HOSP IP/OBS HIGH 50: CPT

## 2024-10-18 PROCEDURE — 83735 ASSAY OF MAGNESIUM: CPT | Performed by: PHYSICIAN ASSISTANT

## 2024-10-18 PROCEDURE — 2500000004 HC RX 250 GENERAL PHARMACY W/ HCPCS (ALT 636 FOR OP/ED)

## 2024-10-18 PROCEDURE — 97530 THERAPEUTIC ACTIVITIES: CPT | Mod: GP

## 2024-10-18 PROCEDURE — 80053 COMPREHEN METABOLIC PANEL: CPT

## 2024-10-18 PROCEDURE — 2500000001 HC RX 250 WO HCPCS SELF ADMINISTERED DRUGS (ALT 637 FOR MEDICARE OP): Performed by: NURSE PRACTITIONER

## 2024-10-18 PROCEDURE — 1170000001 HC PRIVATE ONCOLOGY ROOM DAILY

## 2024-10-18 PROCEDURE — 85025 COMPLETE CBC W/AUTO DIFF WBC: CPT | Performed by: PHYSICIAN ASSISTANT

## 2024-10-18 PROCEDURE — 97530 THERAPEUTIC ACTIVITIES: CPT | Mod: GO

## 2024-10-18 PROCEDURE — 2500000005 HC RX 250 GENERAL PHARMACY W/O HCPCS: Performed by: NURSE PRACTITIONER

## 2024-10-18 PROCEDURE — 85045 AUTOMATED RETICULOCYTE COUNT: CPT

## 2024-10-18 PROCEDURE — 36415 COLL VENOUS BLD VENIPUNCTURE: CPT

## 2024-10-18 PROCEDURE — 85610 PROTHROMBIN TIME: CPT

## 2024-10-18 PROCEDURE — 2500000001 HC RX 250 WO HCPCS SELF ADMINISTERED DRUGS (ALT 637 FOR MEDICARE OP)

## 2024-10-18 PROCEDURE — 94660 CPAP INITIATION&MGMT: CPT

## 2024-10-18 RX ORDER — WARFARIN 2.5 MG/1
2.5 TABLET ORAL ONCE
Status: COMPLETED | OUTPATIENT
Start: 2024-10-19 | End: 2024-10-19

## 2024-10-18 RX ORDER — WARFARIN SODIUM 5 MG/1
5 TABLET ORAL DAILY
Status: DISCONTINUED | OUTPATIENT
Start: 2024-10-20 | End: 2024-10-21 | Stop reason: HOSPADM

## 2024-10-18 ASSESSMENT — COGNITIVE AND FUNCTIONAL STATUS - GENERAL
TURNING FROM BACK TO SIDE WHILE IN FLAT BAD: A LITTLE
TURNING FROM BACK TO SIDE WHILE IN FLAT BAD: A LITTLE
DAILY ACTIVITIY SCORE: 20
MOVING TO AND FROM BED TO CHAIR: A LOT
DRESSING REGULAR UPPER BODY CLOTHING: A LITTLE
CLIMB 3 TO 5 STEPS WITH RAILING: TOTAL
HELP NEEDED FOR BATHING: A LOT
STANDING UP FROM CHAIR USING ARMS: A LOT
HELP NEEDED FOR BATHING: A LITTLE
MOBILITY SCORE: 12
DRESSING REGULAR LOWER BODY CLOTHING: A LOT
PERSONAL GROOMING: A LITTLE
HELP NEEDED FOR BATHING: A LITTLE
MOVING FROM LYING ON BACK TO SITTING ON SIDE OF FLAT BED WITH BEDRAILS: A LITTLE
MOVING TO AND FROM BED TO CHAIR: A LITTLE
EATING MEALS: A LITTLE
DAILY ACTIVITIY SCORE: 20
MOVING TO AND FROM BED TO CHAIR: A LITTLE
DAILY ACTIVITIY SCORE: 15
TOILETING: A LOT
WALKING IN HOSPITAL ROOM: A LOT
MOBILITY SCORE: 15
TOILETING: A LITTLE
DRESSING REGULAR UPPER BODY CLOTHING: A LITTLE
CLIMB 3 TO 5 STEPS WITH RAILING: TOTAL
DRESSING REGULAR LOWER BODY CLOTHING: A LITTLE
TURNING FROM BACK TO SIDE WHILE IN FLAT BAD: A LITTLE
STANDING UP FROM CHAIR USING ARMS: A LOT
WALKING IN HOSPITAL ROOM: A LOT
DRESSING REGULAR LOWER BODY CLOTHING: A LITTLE
MOBILITY SCORE: 15
DRESSING REGULAR UPPER BODY CLOTHING: A LITTLE
WALKING IN HOSPITAL ROOM: TOTAL
STANDING UP FROM CHAIR USING ARMS: A LOT
CLIMB 3 TO 5 STEPS WITH RAILING: TOTAL
TOILETING: A LITTLE

## 2024-10-18 ASSESSMENT — PAIN SCALES - GENERAL
PAINLEVEL_OUTOF10: 7
PAINLEVEL_OUTOF10: 8
PAINLEVEL_OUTOF10: 9
PAINLEVEL_OUTOF10: 8
PAINLEVEL_OUTOF10: 8
PAINLEVEL_OUTOF10: 9
PAINLEVEL_OUTOF10: 8
PAINLEVEL_OUTOF10: 7
PAINLEVEL_OUTOF10: 9
PAINLEVEL_OUTOF10: 7
PAINLEVEL_OUTOF10: 8

## 2024-10-18 ASSESSMENT — PAIN - FUNCTIONAL ASSESSMENT
PAIN_FUNCTIONAL_ASSESSMENT: 0-10

## 2024-10-18 NOTE — PROGRESS NOTES
10/10/24 1100   Discharge Planning   Who is requesting discharge planning? Patient   Home or Post Acute Services Post acute facilities (Rehab/SNF/etc)   Type of Post Acute Facility Services Skilled nursing   Expected Discharge Disposition SNF  ([facility])     Care Transitions Note  10/10/24  Patient had exchange recently and is now bridging coumadin. Plan to start pre-cert for SNF [facility] on Sunday. PT/OT notes are needed for pre-cert. Pre-cert team requests that PT/OT see patient Friday/Saturday so that pre-cert can begin Sunday in preparation for dc Monday. [facility] updated whiteboard to reflect need for PT/OT with these instructions. PT made aware via secure chat. OT not signed in to patient today but will attempt to contact. Following for other dc needs.   YULI Zamorano     UPDATE 10/14/24  Pre-cert initiated by  pre-cert team. Current notes uploaded.   YULI Zamorano     UPDATE 3:36 pm 10/14  Precert Status: Approved.  [facility] Auth ID # [id number].  Dates: 10/14/2024 - 10/16/2024   Will set up transport when final go-ahead is given from medical team. Pt was getting additional work up for ankle pain. 7000 done in HENS. Facility made aware.  YULI Zamorano    UPDATE 10/16/24  Patient planned for discharge tomorrow to [facility] pending improvement in ankle pain. Pain meds adjusted by team. Updates sent to [facility]. LSW asked for extension of pre-cert to tomorrow. Per pre-cert team, pt can admit up to 11:59pm 10/17 on this auth. Otherwise we would need to submit a new request. Team aware.  YULI Zamorano     UPDATE 10/18/24  Pre-cert started this morning by  pre-cert team. Patient planned to admit to [facility] today, per team. [facility] following.   YULI Zmaorano

## 2024-10-18 NOTE — PROGRESS NOTES
"Senait Narvaez is a 54 y.o. female on day 38 of admission presenting with Sickle cell disease with crisis and other complication.    Subjective   Seen at bedside, looks well this AM. Feels good other than L ankle, pain is 8/10. Still wrapped. Denies N/V/D/C, chest pain, cough, shortness of breath.  Waiting for labs today.       Objective     Physical Exam  Constitutional:       General: She is not in acute distress.     Appearance: She is obese. She is not toxic-appearing.   HENT:      Head: Normocephalic and atraumatic.   Eyes:      General: No scleral icterus.     Extraocular Movements: Extraocular movements intact.   Cardiovascular:      Rate and Rhythm: Normal rate and regular rhythm.   Pulmonary:      Effort: No respiratory distress.   Abdominal:      General: Abdomen is flat.      Palpations: Abdomen is soft.      Tenderness: There is no abdominal tenderness.   Musculoskeletal:         General: Swelling (L ankle) and tenderness (L ankle) present.   Skin:     Coloration: Skin is not jaundiced.      Findings: No bruising or erythema.   Neurological:      Mental Status: She is oriented to person, place, and time. Mental status is at baseline.         Last Recorded Vitals  Blood pressure 115/61, pulse 80, temperature 36.3 °C (97.3 °F), temperature source Temporal, resp. rate 16, height 1.651 m (5' 5\"), weight 113 kg (248 lb 7.3 oz), SpO2 95%.  Intake/Output last 3 Shifts:  I/O last 3 completed shifts:  In: - (0 mL/kg)   Out: 1300 (11.5 mL/kg) [Urine:1300 (0.3 mL/kg/hr)]  Weight: 112.7 kg         Assessment/Plan   Assessment & Plan  Sickle cell disease with crisis and other complication  Senait Narvaez is a 54 year-old female with a PMHx of Hgb SC disease (previously on exchange transfusions q4-6 weeks, last transfusion 3/1/24), hx of SVC syndrome, recurrent DVT/PE (on lifelong Coumadin), pHTN (6/2023), cauda equina with saddle numbness, hx SVT, fibromyalgia, Raynaud's disease, CKD II (baseline SCr~<2) and CAN who " presented to OSH ED for diffuse pain and lethargy, then transferred to  MICU for hemodynamic instability. Patient was subsequently intubated due to worsening lethargic and lack of respiratory compensation from significant metabolic acidosis with concern for ACS. Vancomycin and Zosyn started for empiric coverage, patient also started on pressors for BP support. Patient was transfused 2 units pRBCs and then underwent RBCEx on 9/11. Cardiology consulted for c/f NSTEMI vs demand ischemia (ST depressions and elevated troponin), rec treat as ACS. Course further c/b severe CHARLES with peak sCr of 5.4, and acute liver injury with severely elevated liver enzymes (ALT 4119, AST >10k). Liver US only remarkable for fatty infiltration, viral hepatitis panel negative. CHARLES and liver injury improved with supportive management. Pruritic rash noted 9/14, Derm consulted and took multiple biopsies 9/15. Rash then began to worsen to involve the groin, lateral thigh, and scalp with numerous tense bullae. Discontinued heparin given concerns for potential HIT, started bivalirudin. However PF4 was negative. Patient was extubated on 9/16/2024. Started stress dose steroids given continued pressor requirements, pressors Dc'd 9/17.      Patient transferred to [facility] 9/17, PT/OT rec SNF. Coumadin bridge was started 9/18. Given persistent rash with unremarkable labs, derm re-biopsied on 9/18, findings ultimately felt to be most c/w fixed drug eruption. Dermatology contacted for bx results (9/20) rec triamcinolone cream BID and interdry cloths. Leukocytosis uptrending 9/21, repeat infectious workup negative (CXR, Abd XR, blood cultures, MRSA negative 9/21). UA with leuks. On 9/25, per nursing and patient, rash seems to be worsening. Derm re-engaged recommended to apply vasoline to all eroded/denuded areas and continue triamcinolone cream to both the black plaques as well as the red areas across the trunk and thighs. Per dermatology, erythema is mild  and not palpable, it is most likely part of the drug eruption the patient has previously been known to have. Wound care following. For warfarin dosing, per pharmacy, if INR between 2-3 can restart home warfarin 5mg daily, if INR < 2 restart bival. INR 3.4 on 9/25, warfarin stopped, repeat INR 2.8 9/26 and warfarin restarted. On 9/26, increased PO oxycodone and discontinued IV dilaudid in anticipation for discharge. Overnight on 9/29, brain attack called out of concern for change in mental status, no focal deficits noted, BAT cancelled and narcan administered x3 with improvement of mental status. Mentation improved as of 10/2, small dose oxy resumed. INR supratherapeutic 10/3 at 6.7, 5mg IV Vit K x1 given (ok per pharmacy) I/s/o ?bleeding from vagina vs wound excoriation and UA with +blood. INR  down to 1.5 (10/4). Plan for RBCEx prior to dc per Dr. Whaley, planned 10/8. 10/5 started heparin drip (per attending) given normal INR while pending apheresis line placement (previous HIT workup with negative PF4). 10/7 started bridge back to coumadin with lovenox after apheresis line. Continue therapeutic Lovenox 120mg BID with Coumadin 5mg nightly x5 days and until INR is in therapeutic range x24 hours. 10/5 ordered duplex BLE as pt c/o severe LLE pain and swelling which was negative. Left ankle x-ray (10/8) negative for acute fractures, showed soft tissue edema. CT of left ankle (10/13) with diffuse nonspecific edema without fluid collection, may represent vascular congestion, no acute osseous abnormalities, no AVN. Uric acid elevated to 7.9, started colchicine 10/15 for acute gout flare. 10/15, noted hardening of left breast and skin changes, after discussion with attending and Dr. Whaley US Bl breast and mammogram ordered. Mammogram and US negative for acute abnormalities. On 10/16, pt c/o increased numbness and decreased sensation to plantar side of left foot most likely from nerve impingement I/s/o increased LE edema with  no change in ankle pain since starting colchicine. 10/16, Colchicine was discontinued and indomethacin was started BID for gout pain. Indomethacin was held 10/18 2/2 CHARLES, though pain was improving. INR elevated to 3.6 10/18, dose held and planning for half dose on 10/19. DC to SNF pending improvement of CHARLES and INR.      # Left LE pain   # left plantar toes and arch numbness   # acute gout   - 10/5 ordered duplex BLE as pt c/o severe LLE pain and swelling which was negative  - Left ankle x-ray (10/8) negative for acute fractures, showed soft tissue edema  - CT of left ankle (10/13) with diffuse nonspecific edema without fluid collection, may represent vascular congestion, no acute osseous abnormalities, no AVN  - Uric acid elevated to 7.9 (10/15)   - s/p 1.2 mg loading dose of colchicine 10/15 and 0.6mg colchicine 10/16 AM without improvement of pain, per UTD and pharmacy, best to start colchicine 24-36 hours with initial flare, since pain started earlier this week, difficult to assess if colchicine will improve symptoms   - 10/16 c/o Left plantar side of toes and arch of foot with numbness/tingling. Decreased sensation reported in AM. All other neuro assessment WNL c/f nerve impingement from increased swelling in leg earlier this week   - stopped colchicine 10/16 and started 75mg indomethacin BID for gout pain and new concerns for tarsal tunnel syndrome   - Fructosamine pending 10/16 I/s/o family history of DM and decrease sensation/ numbness on plantar side of left foot. A1C to r/o DM will not be accurate I/s/o sickle cell disease   - s/p additional 40mg IV lasix 10/15, 10/16, and additional 20mg IV lasix given 10/17 per attending physician   - 10/17, pain is improving and more localized to left lateral malleolus and bunion, will continue treatment as above and if symptoms continue to improve can be discharged 10/18 or 10/19 pending precert   - PT to re-evaluate 10/16, OT to see pt on 10/17 for precert  re-submission     # CHARLES on CKD II  - Baseline ~ SCr <2, peak 5.4 this admission. 9/21 sCr 1.58 --> 1.13 (9/25)--> 1.14 (10/3)--> 0.96 (10/6) -->  1.01 (10/14) --> 0.99 (10/15) --> 1.07 (10/16) --> 1.39 (10/17)   - Recent increase in sCr most likely I/s/o IV lasix given (10/14-10/17), sCr still below BL as of 10/17   - Likely pre-renal vs ATN given persistant hypotension over admission, on CKD II, now polyuric phase  - FeNA 0.4%  - Severe hyperkalemia with previous peaked T waves on EKG, resolved  - Nephrology following while in ICU, now signed off   - s/p multiple K cocktails in MICU  - sCr elevated to 1.93 on 10/18; likely multifactorial r/t lasix 10/17 and indomethacin  - s/p 500cc LR bolus 10/18, will recheck RFP 10/18 evening   - Encourage increased PO intake, strict I&Os Q6hrs     # DVT/ PE/ SVC Thrombus  - Hx SVC stricture s/p SVC angioplasty  - B/l Upper Extremity and Facial Swelling d/t partial filling defect within the SVC and a thrombus of the SVC complicated by bilateral Mediport catheters. On lifelong anticoagulation, DOAC discouraged due to high risk of clotting   - Home Coumadin 5mg daily initially HELD on admission to MICU 2/2 unstable course  - Restarted coumadin 9/18 PM, will plan to bridge with bival, Ok'd by heme   - 9/20 INR 3.8, bival and home warfarin held. Pharmacy rec repeat INR 4 hours after bival was held. If the INR < 2, we can restart the bival, but if the INR 2-3, we can continue with warfarin monotherapy   - 9/20 repeat INR 3.7, pharmacy rec continuing to hold both medications  - 9/21 INR 3.2, per pharmacy - get repeat INR in AM 9/22, if INR between 2-3 can restart home warfarin 5mg daily  -INR 2.6 (9/24) after restarting Warfarin 5mg, will maintain INR goal of 2-3  - INR 3.4 (9/25). Held warfarin on 9/25. Repeat INR 9/26. Plan to restart if <3   - INR 2.8 (9/26), restarted warfarin, recheck INR 9/27   - INR 4.0 (9/30), held warfarin, repeat INR 10/1 ordered, plan to restart if <3  -  INR 6.8 (10/1), patient without any active bleeding, per attending and pharmacy, warfarin held   - INR 6.8 (10/2), patient without any active bleeding, per attending and pharmacy, warfarin held   - INR 6.7 (10/3), cont to hold warfarin. Ordered 5mg IV Vit K x1 (ok per pharmacy) d/t ?bleeding, vaginal vs wound excoriation and UA +blood  - 10/4 INR normalized to 1.6, continue to hold warfarin  - 10/5 pt c/o severe LLE pain and swelling; duplex BLE negative  - 10/5 INR 1.2; started heparin drip given normal INR while pending apheresis line placement (previous HIT workup with negative PF4)--> held 10/7 at 0400 for apheresis line placement  - 10/7 INR 1.2--> started bridge back to coumadin tonight with lovenox. Started after apheresis line placement will start coumadin 5mg and start therapeutic lovenox 120mg BID x5 days AND until INR therepeutic x24 hours (regimen discussed with pharmacy)  - 10/10 INR 1.6   - Home coumadin 5mg daily dose resumed 10/12  - 10/13 INR 1.9   - INR therapeutic 10/14--10/17  - INR 10/18 3.6, dose held; plan for 2.5mg dose on 10/19   - Follows with Coumadin clinic outpatient  - of note, SNF can check INR as little or as often as needed.      # Left breast skin changes - improved   - Noted hardening and skin changes to left breast on 10/15 around site of previous infection. Non-erythematous, no purulence (see below)   - After discussion with Dr. Whaley on 10/15, he recommended obtaining US   - Breast US limited left and Bl mammogram complete 10/16; Marked left breast diffuse edema, suggestive of vaso-occlusive etiology. Clinical correlation and management till resolution is  recommended. No mammographic or targeted sonographic evidence of malignancy.     # Waxing and waning AMS---Resolved   - Felt to be due to opioids i/s/o worsening renal function and c/b respiratory acidosis  - All opioids on hold (9/28-10/2)  - 10/2 resumed oxycodone 10mg q3 hours as AMS now resolved   - continue to hold  extended release oxycontin   - Keep supplemental O2 on board, but titrate to maintain SPO2 of 90-92%   - On CPAP at home, enforce nightly use to extent possible; may need BiPAP if unresolved     # Purpuric rash, suspected fixed drug eruption  # Concern for possible heparin-induced thrombocytopenia (HIT) -resolved  - Purple, reticular pattern of plaques noted around the bilateral breasts, scalp and groin,  with tense bullae. Non-erythematous, no purulence  - Some initial concern for possible heparin-induced thrombocytopenia with concurrent skin findings. Discontinued heparin 9/16 and transitioned to bivalirudin  - 4 T score 0, PF4 w/ reflex BINA negative. Hematology ultimately consulted, felt there was low concern for HIT and signed off  - Differential diagnosis includes purpura secondary to infection versus coagulopathy versus vasculopathy versus small and/or medium vessel vasculitis versus calciphylaxis vs drug rash  - Dermatology consulted, work-up to date:  - Low Protein C activity  - ANCA, PR3 and MPO negative  - ISABELLE neg  - cryoglobulin labs- not enough specimen to analyze  - S/p skin punch biopsies x 2 9/15 and one on 9/18, showed SUPERFICIAL EPIDERMAL DEGENERATION WITH ERYTHROCYTE EXTRAVASATION WITH NEUTROPHILS. These findings could be seen secondary to an older trauma, or resolving erythema multiforme, a fixed drug eruption, Edgar-Christopher syndrome, or toxic epidermal necrolysis. Favor Multifocal Fixed Drug Eruption    - Dermatology contacted (9/20) about results, recommended adding vancomycin to allergy list and starting triamcinolone  - On 9/25, concern from pt and nursing that rash is not improving. Under breasts and groin still causing pain. Bilateral upper thighs and forehead rash improving   - Wound care re-consulted 9/25   - Re-engaged dermatology on 9/25 given worsening rash; recommended to apply vasoline to all eroded/denuded areas and continue triamcinolone cream to both the black plaques as well as  the red areas across the trunk and thighs. 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     # Leukocytosis-- resolved   - Likely I/s/o rash vs reactive vs infectious process  - Leukocytosis noted on admission to MICU (WBC 24.8) --> 12.7 (9/25) --> 8.3 (10/3) --> as of 10/14, leukocytosis continues to be resolved   - Blood cultures 9/10, 9/12, 9/15 all NGTD  - Strep and Legionella Ag, MRSA nares negative  - CXR (9/21) largely unremarkable, again re-demonstrated perihilar prominence seen on prior XR  - Abdominal xray (9/21) unremarkable  - Repeat blood cultures x 2 (9/21) NGTD  - UA (9/21) with 500 LE  - UA (9/30) +bacteria, +leuks, urine culture (9/29) mult organisms  - UA sent 10/3 with +blood, leuks, and WBC- not reflex to cx so UCX added on to 10/3 UA and again showed multiple organisms  - Will hold off tx for UTI as pt asymptomatic and leukocytosis improved. 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  # ACS  - Previously managed through routine RBC exchanges q6 weeks, most recent RBCEx 3/1/24, per patient pausing on transfusions for time being  - OARRS reviewed, no aberrant behavior noted  - LDH>12,000 (now downtrending), haptoglobin <30, fibrinogen 318, retic % 5.2 on presentation, bili 10.7  - Leukocytosis 24.8 on admission to MICU  - CXR (9/10) persistent atelectasis/scarring in the left lower lung  - CT CAP (9/11) Diffuse mosaic attenuation of the lung parenchyma, which can be seen in the setting of small airway disease, pulmonary edema or acute infection  - Intubated upon arrival to MICU, now s/p extubation 9/16  - s/p Vanc and Zosyn (finished both courses prior to floor transfer)  - Procal elevated 7.09 (9/11)  - Blood cultures 9/10, 9/12, 9/15 all NGTD  - Strep and Legionella Ag, MRSA nares negative  - Trop peak 1431, cards rec treat as ACS (see below)  - S/p 2u pRBCs 9/10 on arrival  to MICU  - S/p exchange transfusion 9/11 AM for ACS  - S/p routine exchange transfusion 10/8 per Dr. Whaley   - Care plan from 12/6/23- For mild to moderate pain: Dilaudid 0.5mg IVP q3h as needed, for moderate to severe pain Dilaudid 1- 1.5mg q3h PRN  - On arrival to [facility], started on 0.6mg dilaudid IVP q3h PRN severe pain   - s/p 0.6mg dilaudid IVP q4h PRN breakthrough and 15mg PO oxycodone Q4 hrs severe pain (9/25)  - Per discussion with Dr. Whaley (9/25), okay to increase PO oxycodone to 15-20mg Q4hr to optimize pain for discharge to SNF.   - On 9/26, discontinued IV dilaudid and increased to 20mg PO oxycodone q4hrs for severe pain   - Opioids on hold as of 9/28 d/t AMS- resumed 2.5mg oxy q4hrs PRN severe pain on 10/2. ER oxy 10mg BID still held  - (10/5- current) increased pain reg as AMS resolved--> continue oxycodone 10mg q3hrs PRN severe pain  - Continue to hold ER Oxycontin indefinitely as was major contributor to AMS   - Bowel regimen for opioid induced constipation with Senna 2tabs BID, Miralax BID, and dulcolax suppository 10mg PRN constipation  - Zofran for opioid induced nausea, no benadryl ordered (assumed from previous AMS but also no reports of opioid-induced pruritus)  - Continue home Folic Acid 1mg daily, continue thiamine 100mg daily  - Removed femoral line 10/15      # ST depression with tropinemia, suspected 2/2 demand ischemia iso ACS  # Hx SVT  - Elevated troponins on admission, peak 1431 and now downtrending   - EKG with ST depressions, likely Type II MI due to vaso-occlusive crisis and anemia  - TTE 9/11: EF 60-65%, RV enlarged and reduced in function which appears stable when compared to last TTE (1/2024)  - Troponin leak is likely due to cardiac demand vs supply mismatch in the setting of worsening anemia and vaso-occlusive crisis  - Cardiology consulted, now signed off with indication to treat troponemia as ACS  - Lasix held in MICU 2/2 hypotension and CHARLES, can resume as able   - Metop 12.5mg  BID held in MICU 2/2 hypotension- BP and HR continue to remain stable, holding off on restarting for now  - s/p 1x dose of po lasix 20mg on 10/9 and s/p IVP 20mg lasix on 10/10 for volume overload, plan to resume home dose lasix once LE edema back to patient's baseline   - home dose of lasix of 20mg po every other day resumed on 10/12  - 10/14 s/p 1x dose of IVP lasix for increased LE edema   - 10/15 and 10/16 s/p 1x dose of 40mg IVP lasix for increased LE edema   - 10/17 s/p 20mg IV lasix per attending physician Dr. Hensley with evening RFP to assess sCr   - Follow-up with Cardiology outpt, FUV 2/13/25     # Acute liver injury, improving  # Direct hyperbilirubinuria, improving  - Suspected 2/2 hemodynamic instability versus sickle cell crisis  [facility] 10.2, INR 3.0; continues to improve  - CT with hepatic venous congestion, patient lacks gallbladder  - Liver US with Doppler: Diffuse fatty infiltration, unremarkable doppler interrogation of the liver  - EBV IgG positive, IgM negative  - CMV IgG positive, PCR negative  - Acute hepatitis panel unremarkable  - Hepatology initially following, now signed off  - Will continue to monitor, trend daily CMP     # pHTN   - Initial c/f CTEPH as imaging findings with dilated PA, filling defects, and mosaicism  - VQ scan (6/13/23) with non anatomical basal defects and RUL defect due to scar--> not favoring CTEPH per Dr. Yi and Dr. 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 (PFT to reevaluate for obstruction and small airway disease), and sleep apnea    - c/w home 2 L via CPAP at night   - 10/12: Restarted home Lasix 20 mg every other day  - Follows with pulmonology outpt     # CAN  - cont home CPAP   -  cont home Albuterol PRN     # H/O Cauda Equine syndrome  - Follows Dr. Delacruz as outpatient  - no current issues      # DISPO  - Full code- confirmed on admission  - NOK: Tati Salgado (mother) (#[phone]); updated @ bedside and discussed dc plan on 10/17   - PT/OT rec SNF, plan for rehab at [facility]  - DC to SNF pending pre-cert re-approval; most likely 10/18 or 10/19   - Derm FUV 11/7, Sickle Cell FUV 11/18, Cardiology FUV 2/13         I spent >60 minutes in the professional and overall care of this patient.      Jazmin Pavon, APRN-CNP

## 2024-10-18 NOTE — PROGRESS NOTES
Art Therapy Note    Senait Narvaez     Therapy Session  Referral Type: New referral this admission  Visit Type: Follow-up visit  Session Start Time: 1531  Session End Time: 1532  Intervention Delivery: In-person  Conflict of Service: Asleep  Number of family members present: 0              Treatment/Interventions  Areas of Focus: Emotional support  Art Therapy Interventions: Empathic listening/validating emotions  Interruption: No  Patient Fell Asleep at End of Session: No    Post-assessment  Total Session Time (min): 1 minutes    Narrative  Assessment Detail: FELIPE made a visit to Pt.;s room to see if she was open to having a an AT session to use her to help her work through recent bad news about her health sahred in session prior.  Pt was found comfortably sleeping.  ATR will follow up another time to offer services.  Follow-up: ATR will follow up [facility] Pt later in the week to offer support in helping her use her art making to process her bad news told today.    Education Documentation  No documentation found.

## 2024-10-18 NOTE — PROGRESS NOTES
Physical Therapy    Physical Therapy Treatment    Patient Name: Senait Narvaez  MRN: [id number]  Department: [facility]  Room: Counts include 234 beds at the [facility]  Today's Date: 10/18/2024  Time Calculation  Start Time: 1438  Stop Time: 1511  Time Calculation (min): 33 min         Assessment/Plan   PT Assessment  PT Assessment Results: Decreased strength, Decreased endurance, Impaired balance, Decreased mobility, Orthopedic restrictions, Pain  Rehab Prognosis: Good  Barriers to Discharge: unmanageable left ankle pain  Evaluation/Treatment Tolerance: Patient limited by pain  Medical Staff Made Aware: Yes  Strengths: Attitude of self, Coping skills  Barriers to Participation: Comorbidities  End of Session Communication: Bedside nurse  Assessment Comment: The pt presented with pain-limiting left ankle pain that prevents gait training.  End of Session Patient Position: Bed, 3 rail up, Alarm off, not on at start of session  PT Plan  Inpatient/Swing Bed or Outpatient: Inpatient  PT Plan  Treatment/Interventions: Bed mobility, Transfer training, Gait training, Stair training, Balance training, Strengthening, Endurance training, Range of motion, Therapeutic exercise, Therapeutic activity, Home exercise program  PT Plan: Ongoing PT  PT Frequency: 3 times per week  PT Discharge Recommendations: Moderate intensity level of continued care  Equipment Recommended upon Discharge: Wheeled walker  PT Recommended Transfer Status: Assist x1  PT - OK to Discharge: Yes      General Visit Information:   PT  Visit  PT Received On: 10/18/24  Response to Previous Treatment: Patient reporting fatigue but able to participate.  General  Prior to Session Communication: Bedside nurse  Patient Position Received: Bed, 3 rail up, Alarm off, not on at start of session  Preferred Learning Style: verbal, visual, written  General Comment: Pt was pleasant, cooperative and willing to participate in therapy.    Subjective   Precautions:  Precautions  Hearing/Visual Limitations: Hearing  and vision WFL with glasses.  Medical Precautions: Fall precautions, Oxygen therapy device and L/min  Precautions Comment: Pt in compliance with precautions throughout PT session.    Vital Signs (Past 2hrs)        Date/Time Vitals Session Patient Position Pulse Resp SpO2 BP MAP (mmHg)    10/18/24 1438 Pre PT  Lying  72  --  96 %  --  --     10/18/24 1600 --  --  70  16  98 %  121/59  72                   Vital Signs Comment: vitals stable     Objective   Pain:  Pain Assessment  Pain Assessment: 0-10  0-10 (Numeric) Pain Score: 8  Pain Type: Acute pain  Pain Location: Ankle  Pain Orientation: Left  Pain Descriptors: Throbbing, Stabbing, Pounding, Sharp  Pain Frequency: Constant/continuous  Pain Onset: Ongoing  Clinical Progression: Gradually worsening (with pressure)  Effect of Pain on Daily Activities: Disabling  Patient's Stated Pain Goal: No pain  Pain Interventions: Therapeutic presence  Response to Interventions: Pain increase  Cognition:  Cognition  Overall Cognitive Status: Within Functional Limits  Orientation Level: Oriented X4  Following Commands: Follows all commands and directions without difficulty  Coordination:  Movements are Fluid and Coordinated: Yes  Coordination Comment: WFL  Postural Control:  Postural Control  Postural Control: Within Functional Limits  Posture Comment: Pt presented with good sitting and standing posture using face-to-face HHA.  Static Sitting Balance  Static Sitting-Balance Support: Bilateral upper extremity supported, Feet supported  Static Sitting-Level of Assistance: Distant supervision  Static Sitting-Comment/Number of Minutes: Sitting EOB  Dynamic Sitting Balance  Dynamic Sitting-Balance Support: Bilateral upper extremity supported, Feet supported  Dynamic Sitting-Level of Assistance: Distant supervision  Dynamic Sitting-Comments: Sitting EOB  Static Standing Balance  Static Standing-Balance Support: Bilateral upper extremity supported  Static Standing-Level of Assistance:  Moderate assistance  Static Standing-Comment/Number of Minutes: using face-to-face HHA  Dynamic Standing Balance  Dynamic Standing-Balance Support: Bilateral upper extremity supported  Dynamic Standing-Level of Assistance: Moderate assistance  Dynamic Standing-Comments: using face-to-face HHA  Extremity/Trunk Assessments:     RUE   RUE : Within Functional Limits  LUE   LUE: Within Functional Limits  RLE   RLE : Exceptions to WFL  AROM RLE (degrees)  RLE AROM Comment: WFL  Strength RLE  R Hip Flexion: 3+/5  R Knee Flexion: 4-/5  R Knee Extension: 4-/5  R Ankle Dorsiflexion: 4+/5  R Ankle Plantar Flexion: 4+/5  LLE   LLE : Exceptions to WFL  AROM LLE (degrees)  LLE AROM Comment: Decreased hip, knee, and ankle ROM.  Strength LLE  L Hip Flexion: 3/5  L Knee Flexion: 3/5  L Knee Extension: 3/5  L Ankle Dorsiflexion: 3+/5  L Ankle Plantar Flexion: 3+/5  Activity Tolerance:  Activity Tolerance  Endurance: Decreased tolerance for upright activites  Treatments:  Therapeutic Exercise  Therapeutic Exercise Performed: No    Therapeutic Activity  Therapeutic Activity Performed: Yes  Therapeutic Activity 1: The pt performed OOB activity.    Balance/Neuromuscular Re-Education  Balance/Neuromuscular Re-Education Activity Performed: Yes  Balance/Neuromuscular Re-Education Activity 1: Supervision assistance static sitting balance sitting EOB.  Balance/Neuromuscular Re-Education Activity 2: Supervision assistance dynamic sitting balance sitting EOB.  Balance/Neuromuscular Re-Education Activity 3: Moderate assistance dynamic standing balance performing bed to chair to bed pivot transfers using face-to-face HHA.    Bed Mobility  Bed Mobility: Yes  Bed Mobility 1  Bed Mobility 1: Supine to sitting  Level of Assistance 1: Close supervision  Bed Mobility Comments 1: HOB elevated  Bed Mobility 2  Bed Mobility  2: Sitting to supine  Level of Assistance 2: Close supervision  Bed Mobility Comments 2: HOB elevated    Ambulation/Gait  Training  Ambulation/Gait Training Performed: No  Transfers  Transfer: Yes  Transfer 1  Transfer From 1: Bed to  Transfer to 1: Chair with arms  Technique 1: Stand pivot  Transfer Device 1:  (using face-to-face HHA)  Transfer Level of Assistance 1: Moderate assistance  Transfers 2  Transfer From 2: Chair with arms to  Transfer to 2: Bed  Technique 2: Stand pivot  Transfer Device 2:  (using face-to-face HHA)  Transfer Level of Assistance 2: Moderate assistance    Stairs  Stairs: No    Outcome Measures:  [facility] Basic Mobility  Turning from your back to your side while in a flat bed without using bedrails: A little  Moving from lying on your back to sitting on the side of a flat bed without using bedrails: A little  Moving to and from bed to chair (including a wheelchair): A lot  Standing up from a chair using your arms (e.g. wheelchair or bedside chair): A lot  To walk in hospital room: Total  Climbing 3-5 steps with railing: Total  Basic Mobility - Total Score: 12    OP EDUCATION:  Outpatient Education  Individual(s) Educated: Patient  Education Provided: Body Mechanics, Fall Risk, Home Safety, Posture  Patient Response to Education: Patient/Caregiver Verbalized Understanding of Information, Patient/Caregiver Performed Return Demonstration of Exercises/Activities    Encounter Problems       Encounter Problems (Active)       PT Problem       Patient will complete supine to sit and sit to supine Independent  (Not Progressing)       Start:  09/17/24    Expected End:  11/01/24            Patient will perform sit<>stand transfer with LRAD, and Modified Independent  (Not Progressing)       Start:  09/17/24    Expected End:  11/01/24            Patient will ambulate >150' with LRAD and Modified Independent  (Not Progressing)       Start:  09/17/24    Expected End:  11/01/24            Patient will complete Tinetti Balance Assesment with a score equal to or better than 18 to reduce falls risk.    (Not Progressing)        Start:  09/17/24    Expected End:  11/01/24                    no concerns

## 2024-10-18 NOTE — PROGRESS NOTES
Occupational Therapy    Occupational Therapy Treatment    Name: Senait Narvaez  MRN: [id number]  : 1970  Date: 10/18/24  Room: [address] [place], [place] [address]      Time Calculation  Start Time: 1251  Stop Time: 1306  Time Calculation (min): 15 min    Assessment:  OT Assessment: Pt continues to demonstrate decreased ADL/IADL and functional mobility independence primarily d/t pain and low endurance/fatigue. Continue to recommend MOD intensity OT.  Prognosis: Good  Evaluation/Treatment Tolerance: Patient limited by pain  Medical Staff Made Aware: Yes  End of Session Communication: Bedside nurse  End of Session Patient Position: Bed, 3 rail up, Alarm off, not on at start of session (RN aware)    Plan:  Treatment Interventions: ADL retraining, Functional transfer training, Endurance training, Patient/family training, Equipment evaluation/education, Compensatory technique education  OT Frequency: 3 times per week  OT Discharge Recommendations: Moderate intensity level of continued care  Equipment Recommended upon Discharge:  (sock aide, reacher)  OT Recommended Transfer Status: Assist of 2  OT - OK to Discharge: Yes (OT eval complete and d/c recs made)    Subjective   General:  OT Last Visit  OT Received On: 10/18/24  Reason for Referral: 53 y/o F presenting to OSH with diffuse pain 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 Pt completed UB and LB dressing ADLs as well as sit<>stand transfer. Continue to recommend MOD intensity OT.  "    Precautions:  Medical Precautions: Fall precautions, Oxygen therapy device and L/min    Vitals:  Vital Signs (Past 2hrs)        Date/Time Vitals Session Patient Position Pulse Resp SpO2 BP MAP (mmHg)    10/18/24 1153 --  --  80  16  95 %  115/61  79                   Lines/Tubes/Drains:  External Urinary Catheter Female (Active)   Number of days: 11     Cognition:  Overall Cognitive Status: Within Functional Limits  Orientation Level: Oriented X4  Following Commands: Follows all commands and directions without difficulty  Problem Solving: Within Functional Limits  Safety/Judgement: Within Functional Limits  Insight: Within function limits  Impulsive: Within functional limits  Processing Speed: Within funtional limits    RUE/LUE: WFL via ADLs and walker management    Pain Assessment:  Pain Assessment  Pain Assessment: 0-10  0-10 (Numeric) Pain Score: 9 (endorsed \"11/10\" during stand)  Pain Location: Foot  Pain Orientation: Left     Objective   Activities of Daily Living:     UE Dressing  UE Dressing Level of Assistance: Minimum assistance, Minimal verbal cues (anticipate SBA without lines)  UE Dressing Where Assessed: Edge of bed    LE Dressing  LE Dressing: Yes  Sock Level of Assistance: Dependent  LE Dressing Where Assessed: Edge of bed  LE Dressing Comments: Pt trialed bringing LEs up in \"figure 4\" position, unable to do so this date d/t pain    Functional Standing Tolerance:  Functional Mobility  Functional Mobility Performed: No    Bed Mobility/Transfers:   Bed Mobility  Bed Mobility: Yes  Bed Mobility 1  Bed Mobility 1: Supine to sitting  Level of Assistance 1: Minimum assistance, Minimal verbal cues  Bed Mobility 2  Bed Mobility  2: Sitting to supine  Level of Assistance 2: Minimum assistance, Minimal verbal cues (assist with bringing LLE into bed d/t pain)  Bed Mobility 3  Bed Mobility 3: Scooting (toward HOB)  Level of Assistance 3: Close supervision, Minimal verbal cues    Transfers  Transfer: " Yes  Transfer 1  Transfer From 1: Sit to  Transfer to 1: Stand  Transfer Device 1: Walker  Transfer Level of Assistance 1: Maximum assistance, Moderate verbal cues  Trials/Comments 1: VCs for walker management.hand placement - did not clear bed for first trial but fully stood for second  Transfers 2  Transfer From 2: Stand to  Transfer to 2: Sit  Transfer Device 2: Walker  Transfer Level of Assistance 2: Minimum assistance, Moderate verbal cues  Trials/Comments 2: VCs for walker management/hand placement (did not place hand on bed upon sitting)    Balance:  Dynamic Sitting Balance  Dynamic Sitting-Level of Assistance: Close supervision  Static Sitting Balance  Static Sitting-Level of Assistance: Close supervision  Static Sitting-Comment/Number of Minutes: supported self with BUEs  Static Standing Balance  Static Standing-Level of Assistance: Moderate assistance  Static Standing-Comment/Number of Minutes: ~20 seconds    Therapy/Activity:   Therapeutic Activity  Therapeutic Activity Performed: Yes  Therapeutic Activity 1: Challenged dynamic seated balance during UB dressing in preparation for seated ADL tasks  Therapeutic Activity 2: Education for walker management and hand placement in preparation for standing ADL/IADL tasks and functional mobility; trialed standing this date  Therapeutic Activity 3: Challenged endurance with ADL tasks and standing - pt observed to be mildly short of breath and fatigued at end of session     Outcome Measures:  [facility] Daily Activity  Putting on and taking off regular lower body clothing: A lot  Bathing (including washing, rinsing, drying): A lot  Putting on and taking off regular upper body clothing: A little  Toileting, which includes using toilet, bedpan or urinal: A lot  Taking care of personal grooming such as brushing teeth: A little  Eating Meals: A little  Daily Activity - Total Score: 15     Education Documentation  Body Mechanics, taught by Betty Joel OT at 10/18/2024   1:51 PM.  Learner: Patient  Readiness: Eager  Method: Explanation, Demonstration  Response: Demonstrated Understanding, Verbalizes Understanding    Precautions, taught by Betty Joel OT at 10/18/2024  1:51 PM.  Learner: Patient  Readiness: Eager  Method: Explanation, Demonstration  Response: Demonstrated Understanding, Verbalizes Understanding    ADL Training, taught by Betty Joel OT at 10/18/2024  1:51 PM.  Learner: Patient  Readiness: Eager  Method: Explanation, Demonstration  Response: Demonstrated Understanding, Verbalizes Understanding    Education Comments  No comments found.      Goals:  Encounter Problems       Encounter Problems (Active)       ADLs       Patient will complete toileting with SBA and min cues.   (Progressing)       Start:  09/17/24    Expected End:  10/21/24            Patient will complete LB dressing with SBA and min cues.   (Progressing)       Start:  09/17/24    Expected End:  10/21/24            Patient will complete UB dressing with set up assist.   (Progressing)       Start:  09/17/24    Expected End:  10/21/24            Patient will complete grooming with set up assist.   (Progressing)       Start:  09/17/24    Expected End:  10/21/24                   BALANCE       Patient will demo standing balance for at least 5 min with SBA in prep for standing ADLs. (Progressing)       Start:  09/17/24    Expected End:  10/21/24               COGNITION/SAFETY       Patient will score WNL on cognitive assessment. (Progressing)       Start:  09/17/24    Expected End:  10/21/24               EXERCISE/STRENGTHENING       Patient will demo UE HEP with MOD I.  (Progressing)       Start:  10/07/24    Expected End:  10/21/24               MOBILITY       Patient will complete bed mobility with SBA and min cues.    (Progressing)       Start:  09/17/24    Expected End:  10/21/24            Pt. Will demo short household distance functional mobility with SBA using LRAD.   (Progressing)       Start:   09/17/24    Expected End:  10/21/24               TRANSFERS       Pt. Will complete stand pivot transfer with SBA assist with min cues and using LRAD.   (Progressing)       Start:  09/17/24    Expected End:  10/21/24                   10/18/24 at 1:51 PM   Betty Joel, OTR/L  [phone]

## 2024-10-18 NOTE — ASSESSMENT & PLAN NOTE
Senait Narvaez is a 54 year-old female with a PMHx of Hgb SC disease (previously on exchange transfusions q4-6 weeks, last transfusion 3/1/24), hx of SVC syndrome, recurrent DVT/PE (on lifelong Coumadin), pHTN (6/2023), cauda equina with saddle numbness, hx SVT, fibromyalgia, Raynaud's disease, CKD II (baseline SCr~<2) and CAN who presented to [facility] ED for diffuse pain and lethargy, then transferred to  MICU for hemodynamic instability. Patient was subsequently intubated due to worsening lethargic and lack of respiratory compensation from significant metabolic acidosis with concern for ACS. Vancomycin and Zosyn started for empiric coverage, patient also started on pressors for BP support. Patient was transfused 2 units pRBCs and then underwent RBCEx on 9/11. Cardiology consulted for c/f NSTEMI vs demand ischemia (ST depressions and elevated troponin), rec treat as ACS. Course further c/b severe CHARLES with peak sCr of 5.4, and acute liver injury with severely elevated liver enzymes (ALT 4119, AST >10k). Liver US only remarkable for fatty infiltration, viral hepatitis panel negative. CHARLES and liver injury improved with supportive management. Pruritic rash noted 9/14, Derm consulted and took multiple biopsies 9/15. Rash then began to worsen to involve the groin, lateral thigh, and scalp with numerous tense bullae. Discontinued heparin given concerns for potential HIT, started bivalirudin. However PF4 was negative. Patient was extubated on 9/16/2024. Started stress dose steroids given continued pressor requirements, pressors Dc'd 9/17.      Patient transferred to [facility] 9/17, PT/OT rec SNF. Coumadin bridge was started 9/18. Given persistent rash with unremarkable labs, derm re-biopsied on 9/18, findings ultimately felt to be most c/w fixed drug eruption. Dermatology contacted for bx results (9/20) rec triamcinolone cream BID and interdry cloths. Leukocytosis uptrending 9/21, repeat infectious workup negative (CXR, Abd XR, blood  cultures, MRSA negative 9/21). UA with leuks. On 9/25, per nursing and patient, rash seems to be worsening. Derm re-engaged recommended to apply vasoline to all eroded/denuded areas and continue triamcinolone cream to both the black plaques as well as the red areas across the trunk and thighs. 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 Wound care following. For warfarin dosing, per pharmacy, if INR between 2-3 can restart home warfarin 5mg daily, if INR < 2 restart bival. INR 3.4 on 9/25, warfarin stopped, repeat INR 2.8 9/26 and warfarin restarted. On 9/26, increased PO oxycodone and discontinued IV dilaudid in anticipation for discharge. Overnight on 9/29, brain attack called out of concern for change in mental status, no focal deficits noted, BAT cancelled and narcan administered x3 with improvement of mental status. Mentation improved as of 10/2, small dose oxy resumed. INR supratherapeutic 10/3 at 6.7, 5mg IV Vit K x1 given (ok per pharmacy) I/s/o ?bleeding from vagina vs wound excoriation and UA with +blood. INR  down to 1.5 (10/4). Plan for RBCEx prior to dc per Dr. Whaley, planned 10/8. 10/5 started heparin drip (per attending) given normal INR while pending apheresis line placement (previous HIT workup with negative PF4). 10/7 started bridge back to coumadin with lovenox after apheresis line. Continue therapeutic Lovenox 120mg BID with Coumadin 5mg nightly x5 days and until INR is in therapeutic range x24 hours. 10/5 ordered duplex BLE as pt c/o severe LLE pain and swelling which was negative. Left ankle x-ray (10/8) negative for acute fractures, showed soft tissue edema. CT of left ankle (10/13) with diffuse nonspecific edema without fluid collection, may represent vascular congestion, no acute osseous abnormalities, no AVN. Uric acid elevated to 7.9, started colchicine 10/15 for acute gout flare. 10/15, noted  hardening of left breast and skin changes, after discussion with attending and Dr. Whaley US Bl breast and mammogram ordered. Mammogram and US negative for acute abnormalities. On 10/16, pt c/o increased numbness and decreased sensation to plantar side of left foot most likely from nerve impingement I/s/o increased LE edema with no change in ankle pain since starting colchicine. 10/16, Colchicine was discontinued and indomethacin was started BID for gout pain. Indomethacin was held 10/18 2/2 CHARLES, though pain was improving. INR elevated to 3.6 10/18, dose held and planning for half dose on 10/19. DC to SNF pending improvement of CHARLES and INR.      # Left LE pain   # left plantar toes and arch numbness   # acute gout   - 10/5 ordered duplex BLE as pt c/o severe LLE pain and swelling which was negative  - Left ankle x-ray (10/8) negative for acute fractures, showed soft tissue edema  - CT of left ankle (10/13) with diffuse nonspecific edema without fluid collection, may represent vascular congestion, no acute osseous abnormalities, no AVN  - Uric acid elevated to 7.9 (10/15)   - s/p 1.2 mg loading dose of colchicine 10/15 and 0.6mg colchicine 10/16 AM without improvement of pain, per UTD and pharmacy, best to start colchicine 24-36 hours with initial flare, since pain started earlier this week, difficult to assess if colchicine will improve symptoms   - 10/16 c/o Left plantar side of toes and arch of foot with numbness/tingling. Decreased sensation reported in AM. All other neuro assessment WNL c/f nerve impingement from increased swelling in leg earlier this week   - stopped colchicine 10/16 and started 75mg indomethacin BID for gout pain and new concerns for tarsal tunnel syndrome   - Fructosamine pending 10/16 I/s/o family history of DM and decrease sensation/ numbness on plantar side of left foot. A1C to r/o DM will not be accurate I/s/o sickle cell disease   - s/p additional 40mg IV lasix 10/15, 10/16, and additional  20mg IV lasix given 10/17 per attending physician   - 10/17, pain is improving and more localized to left lateral malleolus and bunion, will continue treatment as above and if symptoms continue to improve can be discharged 10/18 or 10/19 pending precert   - PT to re-evaluate 10/16, OT to see pt on 10/17 for precert re-submission     # CHARLES on CKD II  - Baseline ~ SCr <2, peak 5.4 this admission. 9/21 sCr 1.58 --> 1.13 (9/25)--> 1.14 (10/3)--> 0.96 (10/6) -->  1.01 (10/14) --> 0.99 (10/15) --> 1.07 (10/16) --> 1.39 (10/17)   - Recent increase in sCr most likely I/s/o IV lasix given (10/14-10/17), sCr still below BL as of 10/17   - Likely pre-renal vs ATN given persistant hypotension over admission, on CKD II, now polyuric phase  - FeNA 0.4%  - Severe hyperkalemia with previous peaked T waves on EKG, resolved  - Nephrology following while in ICU, now signed off   - s/p multiple K cocktails in MICU  - sCr elevated to 1.93 on 10/18; likely multifactorial r/t lasix 10/17 and indomethacin  - s/p 500cc LR bolus 10/18, will recheck RFP 10/18 evening   - Encourage increased PO intake, strict I&Os Q6hrs     # DVT/ PE/ SVC Thrombus  - Hx SVC stricture s/p SVC angioplasty  - B/l Upper Extremity and Facial Swelling d/t partial filling defect within the SVC and a thrombus of the SVC complicated by bilateral Mediport catheters. On lifelong anticoagulation, DOAC discouraged due to high risk of clotting   - Home Coumadin 5mg daily initially HELD on admission to MICU 2/2 unstable course  - Restarted coumadin 9/18 PM, will plan to bridge with bival, Ok'd by heme   - 9/20 INR 3.8, bival and home warfarin held. Pharmacy rec repeat INR 4 hours after bival was held. If the INR < 2, we can restart the bival, but if the INR 2-3, we can continue with warfarin monotherapy   - 9/20 repeat INR 3.7, pharmacy rec continuing to hold both medications  - 9/21 INR 3.2, per pharmacy - get repeat INR in AM 9/22, if INR between 2-3 can restart home  warfarin 5mg daily  -INR 2.6 (9/24) after restarting Warfarin 5mg, will maintain INR goal of 2-3  - INR 3.4 (9/25). Held warfarin on 9/25. Repeat INR 9/26. Plan to restart if <3   - INR 2.8 (9/26), restarted warfarin, recheck INR 9/27   - INR 4.0 (9/30), held warfarin, repeat INR 10/1 ordered, plan to restart if <3  - INR 6.8 (10/1), patient without any active bleeding, per attending and pharmacy, warfarin held   - INR 6.8 (10/2), patient without any active bleeding, per attending and pharmacy, warfarin held   - INR 6.7 (10/3), cont to hold warfarin. Ordered 5mg IV Vit K x1 (ok per pharmacy) d/t ?bleeding, vaginal vs wound excoriation and UA +blood  - 10/4 INR normalized to 1.6, continue to hold warfarin  - 10/5 pt c/o severe LLE pain and swelling; duplex BLE negative  - 10/5 INR 1.2; started heparin drip given normal INR while pending apheresis line placement (previous HIT workup with negative PF4)--> held 10/7 at 0400 for apheresis line placement  - 10/7 INR 1.2--> started bridge back to coumadin tonight with lovenox. Started after apheresis line placement will start coumadin 5mg and start therapeutic lovenox 120mg BID x5 days AND until INR therepeutic x24 hours (regimen discussed with pharmacy)  - 10/10 INR 1.6   - Home coumadin 5mg daily dose resumed 10/12  - 10/13 INR 1.9   - INR therapeutic 10/14--10/17  - INR 10/18 3.6, dose held; plan for 2.5mg dose on 10/19   - Follows with Coumadin clinic outpatient  - of note, SNF can check INR as little or as often as needed.      # Left breast skin changes - improved   - Noted hardening and skin changes to left breast on 10/15 around site of previous infection. Non-erythematous, no purulence (see below)   - After discussion with Dr. Whaley on 10/15, he recommended obtaining US   - Breast US limited left and Bl mammogram complete 10/16; Marked left breast diffuse edema, suggestive of vaso-occlusive etiology. Clinical correlation and management till resolution  is  recommended. No mammographic or targeted sonographic evidence of malignancy.     # Waxing and waning AMS---Resolved   - Felt to be due to opioids i/s/o worsening renal function and c/b respiratory acidosis  - All opioids on hold (9/28-10/2)  - 10/2 resumed oxycodone 10mg q3 hours as AMS now resolved   - continue to hold extended release oxycontin   - Keep supplemental O2 on board, but titrate to maintain SPO2 of 90-92%   - On CPAP at home, enforce nightly use to extent possible; may need BiPAP if unresolved     # Purpuric rash, suspected fixed drug eruption  # Concern for possible heparin-induced thrombocytopenia (HIT) -resolved  - Purple, reticular pattern of plaques noted around the bilateral breasts, scalp and groin,  with tense bullae. Non-erythematous, no purulence  - Some initial concern for possible heparin-induced thrombocytopenia with concurrent skin findings. Discontinued heparin 9/16 and transitioned to bivalirudin  - 4 T score 0, PF4 w/ reflex BINA negative. Hematology ultimately consulted, felt there was low concern for HIT and signed off  - Differential diagnosis includes purpura secondary to infection versus coagulopathy versus vasculopathy versus small and/or medium vessel vasculitis versus calciphylaxis vs drug rash  - Dermatology consulted, work-up to date:  - Low Protein C activity  - ANCA, PR3 and MPO negative  - ISABELLE neg  - cryoglobulin labs- not enough specimen to analyze  - S/p skin punch biopsies x 2 9/15 and one on 9/18, showed SUPERFICIAL EPIDERMAL DEGENERATION WITH ERYTHROCYTE EXTRAVASATION WITH NEUTROPHILS. These findings could be seen secondary to an older trauma, or resolving erythema multiforme, a fixed drug eruption, Edgar-Christopher syndrome, or toxic epidermal necrolysis. Favor Multifocal Fixed Drug Eruption    - Dermatology contacted (9/20) about results, recommended adding vancomycin to allergy list and starting triamcinolone  - On 9/25, concern from pt and nursing that rash is  not improving. Under breasts and groin still causing pain. Bilateral upper thighs and forehead rash improving   - Wound care re-consulted 9/25   - Re-engaged dermatology on 9/25 given worsening rash; recommended to apply vasoline to all eroded/denuded areas and continue triamcinolone cream to both the black plaques as well as the red areas across the trunk and thighs. 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     # Leukocytosis-- resolved   - Likely I/s/o rash vs reactive vs infectious process  - Leukocytosis noted on admission to MICU (WBC 24.8) --> 12.7 (9/25) --> 8.3 (10/3) --> as of 10/14, leukocytosis continues to be resolved   - Blood cultures 9/10, 9/12, 9/15 all NGTD  - Strep and Legionella Ag, MRSA nares negative  - CXR (9/21) largely unremarkable, again re-demonstrated perihilar prominence seen on prior XR  - Abdominal xray (9/21) unremarkable  - Repeat blood cultures x 2 (9/21) NGTD  - UA (9/21) with 500 LE  - UA (9/30) +bacteria, +leuks, urine culture (9/29) mult organisms  - UA sent 10/3 with +blood, leuks, and WBC- not reflex to cx so UCX added on to 10/3 UA and again showed multiple organisms  - Will hold off tx for UTI as pt asymptomatic and leukocytosis improved. 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  # ACS  - Previously managed through routine RBC exchanges q6 weeks, most recent RBCEx 3/1/24, per patient pausing on transfusions for time being  - OARRS reviewed, no aberrant behavior noted  - LDH>12,000 (now downtrending), haptoglobin <30, fibrinogen 318, retic % 5.2 on presentation, bili 10.7  - Leukocytosis 24.8 on admission to MICU  - CXR (9/10) persistent atelectasis/scarring in the left lower lung  - CT CAP (9/11) Diffuse mosaic attenuation of the lung parenchyma, which can be seen in the setting of small airway disease, pulmonary edema or acute infection  -  Intubated upon arrival to MICU, now s/p extubation 9/16  - s/p Vanc and Zosyn (finished both courses prior to floor transfer)  - Procal elevated 7.09 (9/11)  - Blood cultures 9/10, 9/12, 9/15 all NGTD  - Strep and Legionella Ag, MRSA nares negative  - Trop peak 1431, cards rec treat as ACS (see below)  - [facility]  - S/p exchange transfusion 9/11 AM for ACS  - S/p routine exchange transfusion 10/8 per Dr. Whaley   - Care plan from 12/6/23- For mild to moderate pain: Dilaudid 0.5mg IVP q3h as needed, for moderate to severe pain Dilaudid 1- 1.5mg q3h PRN  - On arrival to [facility], started on 0.6mg dilaudid IVP q3h PRN severe pain   - s/p 0.6mg dilaudid IVP q4h PRN breakthrough and 15mg PO oxycodone Q4 hrs severe pain (9/25)  - Per discussion with Dr. Whaley (9/25), okay to increase PO oxycodone to 15-20mg Q4hr to optimize pain for discharge to SNF.   - On 9/26, discontinued IV dilaudid and increased to 20mg PO oxycodone q4hrs for severe pain   - Opioids on hold as of 9/28 d/t AMS- resumed 2.5mg oxy q4hrs PRN severe pain on 10/2. ER oxy 10mg BID still held  - (10/5- current) increased pain reg as AMS resolved--> continue oxycodone 10mg q3hrs PRN severe pain  - Continue to hold ER Oxycontin indefinitely as was major contributor to AMS   - Bowel regimen for opioid induced constipation with Senna 2tabs BID, Miralax BID, and dulcolax suppository 10mg PRN constipation  - Zofran for opioid induced nausea, no benadryl ordered (assumed from previous AMS but also no reports of opioid-induced pruritus)  - Continue home Folic Acid 1mg daily, continue thiamine 100mg daily  - Removed femoral line 10/15      # ST depression with tropinemia, suspected 2/2 demand ischemia iso ACS  # Hx SVT  - Elevated troponins on admission, peak 1431 and now downtrending   - EKG with ST depressions, likely Type II MI due to vaso-occlusive crisis and anemia  - TTE 9/11: EF 60-65%, RV enlarged and reduced in function which appears  stable when compared to last TTE (1/2024)  - Troponin leak is likely due to cardiac demand vs supply mismatch in the setting of worsening anemia and vaso-occlusive crisis  - Cardiology consulted, now signed off with indication to treat troponemia as ACS  - Lasix held in MICU 2/2 hypotension and CHARLES, can resume as able   - Metop 12.5mg BID held in MICU 2/2 hypotension- BP and HR continue to remain stable, holding off on restarting for now  - s/p 1x dose of po lasix 20mg on 10/9 and s/p IVP 20mg lasix on 10/10 for volume overload, plan to resume home dose lasix once LE edema back to patient's baseline   - home dose of lasix of 20mg po every other day resumed on 10/12  - 10/14 s/p 1x dose of IVP lasix for increased LE edema   - 10/15 and 10/16 s/p 1x dose of 40mg IVP lasix for increased LE edema   - 10/17 s/p 20mg IV lasix per attending physician Dr. Hensley with evening RFP to assess sCr   - Follow-up with Cardiology outpt, FUV 2/13/25     # Acute liver injury, improving  # Direct hyperbilirubinuria, improving  - Suspected 2/2 hemodynamic instability versus sickle cell crisis  - On presentation to  MICU ALT 2611, AST 4727, T bili 10.2, INR 3.0; continues to improve  - CT with hepatic venous congestion, patient lacks gallbladder  - Liver US with Doppler: Diffuse fatty infiltration, unremarkable doppler interrogation of the liver  - EBV IgG positive, IgM negative  - CMV IgG positive, PCR negative  - Acute hepatitis panel unremarkable  - Hepatology initially following, now signed off  - Will continue to monitor, trend daily CMP     # pHTN   - Initial c/f CTEPH as imaging findings with dilated PA, filling defects, and mosaicism  - VQ scan (6/13/23) with non anatomical basal defects and RUL defect due to scar--> not favoring CTEPH per Dr. Yi and Dr. 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 Delacruz as outpatient  - no current issues      # DISPO  - Full code- confirmed on admission  - NOK: Tati Salgado (mother) (#[phone]); updated @ bedside and discussed dc plan on 10/17   - PT/OT rec SNF, plan for rehab at [facility]  - DC to SNF pending pre-cert re-approval; most likely 10/18 or 10/19   - Derm FUV 11/7, Sickle Cell FUV 11/18, Cardiology FUV 2/13

## 2024-10-19 LAB
ALBUMIN SERPL BCP-MCNC: 2.5 G/DL (ref 3.4–5)
ALP SERPL-CCNC: 175 U/L (ref 33–110)
ALT SERPL W P-5'-P-CCNC: 15 U/L (ref 7–45)
ANION GAP SERPL CALC-SCNC: 13 MMOL/L (ref 10–20)
AST SERPL W P-5'-P-CCNC: 35 U/L (ref 9–39)
BASOPHILS # BLD AUTO: 0.02 X10*3/UL (ref 0–0.1)
BASOPHILS NFR BLD AUTO: 0.2 %
BILIRUB SERPL-MCNC: 1.3 MG/DL (ref 0–1.2)
BUN SERPL-MCNC: 38 MG/DL (ref 6–23)
CALCIUM SERPL-MCNC: 8.6 MG/DL (ref 8.6–10.6)
CHLORIDE SERPL-SCNC: 90 MMOL/L (ref 98–107)
CHLORIDE UR-SCNC: <15 MMOL/L
CHLORIDE/CREATININE (MMOL/G) IN URINE: NORMAL
CO2 SERPL-SCNC: 34 MMOL/L (ref 21–32)
CREAT SERPL-MCNC: 2.14 MG/DL (ref 0.5–1.05)
CREAT UR-MCNC: 160 MG/DL (ref 20–320)
EGFRCR SERPLBLD CKD-EPI 2021: 27 ML/MIN/1.73M*2
EOSINOPHIL # BLD AUTO: 0.31 X10*3/UL (ref 0–0.7)
EOSINOPHIL NFR BLD AUTO: 3.3 %
ERYTHROCYTE [DISTWIDTH] IN BLOOD BY AUTOMATED COUNT: 18.1 % (ref 11.5–14.5)
FRUCTOSAMINE SERPL-SCNC: 202 UMOL/L (ref 205–285)
GLUCOSE SERPL-MCNC: 82 MG/DL (ref 74–99)
HCT VFR BLD AUTO: 29.7 % (ref 36–46)
HGB BLD-MCNC: 9.6 G/DL (ref 12–16)
HGB RETIC QN: 32 PG (ref 28–38)
IMM GRANULOCYTES # BLD AUTO: 0.04 X10*3/UL (ref 0–0.7)
IMM GRANULOCYTES NFR BLD AUTO: 0.4 % (ref 0–0.9)
IMMATURE RETIC FRACTION: 11.9 %
INR PPP: 3.1 (ref 0.9–1.1)
LDH SERPL L TO P-CCNC: 262 U/L (ref 84–246)
LYMPHOCYTES # BLD AUTO: 1.3 X10*3/UL (ref 1.2–4.8)
LYMPHOCYTES NFR BLD AUTO: 13.6 %
MAGNESIUM SERPL-MCNC: 1.99 MG/DL (ref 1.6–2.4)
MCH RBC QN AUTO: 28.7 PG (ref 26–34)
MCHC RBC AUTO-ENTMCNC: 32.3 G/DL (ref 32–36)
MCV RBC AUTO: 89 FL (ref 80–100)
MONOCYTES # BLD AUTO: 0.75 X10*3/UL (ref 0.1–1)
MONOCYTES NFR BLD AUTO: 7.9 %
NEUTROPHILS # BLD AUTO: 7.11 X10*3/UL (ref 1.2–7.7)
NEUTROPHILS NFR BLD AUTO: 74.6 %
NRBC BLD-RTO: 0.6 /100 WBCS (ref 0–0)
PLATELET # BLD AUTO: 416 X10*3/UL (ref 150–450)
POTASSIUM SERPL-SCNC: 4.5 MMOL/L (ref 3.5–5.3)
POTASSIUM UR-SCNC: 53 MMOL/L
POTASSIUM/CREAT UR-RTO: 33 MMOL/G CREAT
PROT SERPL-MCNC: 6.2 G/DL (ref 6.4–8.2)
PROTHROMBIN TIME: 35.5 SECONDS (ref 9.8–12.8)
RBC # BLD AUTO: 3.34 X10*6/UL (ref 4–5.2)
RETICS #: 0.16 X10*6/UL (ref 0.02–0.08)
RETICS/RBC NFR AUTO: 4.9 % (ref 0.5–2)
SODIUM SERPL-SCNC: 132 MMOL/L (ref 136–145)
SODIUM UR-SCNC: 19 MMOL/L
SODIUM/CREAT UR-RTO: 12 MMOL/G CREAT
WBC # BLD AUTO: 9.5 X10*3/UL (ref 4.4–11.3)

## 2024-10-19 PROCEDURE — 2500000005 HC RX 250 GENERAL PHARMACY W/O HCPCS

## 2024-10-19 PROCEDURE — 85045 AUTOMATED RETICULOCYTE COUNT: CPT

## 2024-10-19 PROCEDURE — 2500000001 HC RX 250 WO HCPCS SELF ADMINISTERED DRUGS (ALT 637 FOR MEDICARE OP)

## 2024-10-19 PROCEDURE — 1170000001 HC PRIVATE ONCOLOGY ROOM DAILY

## 2024-10-19 PROCEDURE — 85025 COMPLETE CBC W/AUTO DIFF WBC: CPT | Performed by: PHYSICIAN ASSISTANT

## 2024-10-19 PROCEDURE — 83735 ASSAY OF MAGNESIUM: CPT | Performed by: PHYSICIAN ASSISTANT

## 2024-10-19 PROCEDURE — 2500000002 HC RX 250 W HCPCS SELF ADMINISTERED DRUGS (ALT 637 FOR MEDICARE OP, ALT 636 FOR OP/ED)

## 2024-10-19 PROCEDURE — 36415 COLL VENOUS BLD VENIPUNCTURE: CPT | Performed by: PHYSICIAN ASSISTANT

## 2024-10-19 PROCEDURE — 85610 PROTHROMBIN TIME: CPT

## 2024-10-19 PROCEDURE — 99233 SBSQ HOSP IP/OBS HIGH 50: CPT

## 2024-10-19 PROCEDURE — 2500000005 HC RX 250 GENERAL PHARMACY W/O HCPCS: Performed by: NURSE PRACTITIONER

## 2024-10-19 PROCEDURE — 2500000001 HC RX 250 WO HCPCS SELF ADMINISTERED DRUGS (ALT 637 FOR MEDICARE OP): Performed by: NURSE PRACTITIONER

## 2024-10-19 PROCEDURE — 83615 LACTATE (LD) (LDH) ENZYME: CPT

## 2024-10-19 PROCEDURE — 80053 COMPREHEN METABOLIC PANEL: CPT

## 2024-10-19 RX ORDER — LIDOCAINE 560 MG/1
3 PATCH PERCUTANEOUS; TOPICAL; TRANSDERMAL DAILY
Status: DISCONTINUED | OUTPATIENT
Start: 2024-10-19 | End: 2024-10-21 | Stop reason: HOSPADM

## 2024-10-19 RX ORDER — ACETAMINOPHEN 325 MG/1
650 TABLET ORAL EVERY 6 HOURS PRN
Status: DISCONTINUED | OUTPATIENT
Start: 2024-10-19 | End: 2024-10-20

## 2024-10-19 ASSESSMENT — PAIN - FUNCTIONAL ASSESSMENT
PAIN_FUNCTIONAL_ASSESSMENT: 0-10

## 2024-10-19 ASSESSMENT — PAIN SCALES - GENERAL
PAINLEVEL_OUTOF10: 8
PAINLEVEL_OUTOF10: 8
PAINLEVEL_OUTOF10: 9
PAINLEVEL_OUTOF10: 9
PAINLEVEL_OUTOF10: 8
PAINLEVEL_OUTOF10: 8
PAINLEVEL_OUTOF10: 9
PAINLEVEL_OUTOF10: 8
PAINLEVEL_OUTOF10: 8
PAINLEVEL_OUTOF10: 0 - NO PAIN
PAINLEVEL_OUTOF10: 8

## 2024-10-19 ASSESSMENT — COGNITIVE AND FUNCTIONAL STATUS - GENERAL
DRESSING REGULAR UPPER BODY CLOTHING: A LITTLE
STANDING UP FROM CHAIR USING ARMS: A LOT
HELP NEEDED FOR BATHING: A LITTLE
TOILETING: A LITTLE
DRESSING REGULAR LOWER BODY CLOTHING: A LITTLE
TOILETING: A LITTLE
DRESSING REGULAR LOWER BODY CLOTHING: A LITTLE
CLIMB 3 TO 5 STEPS WITH RAILING: TOTAL
TURNING FROM BACK TO SIDE WHILE IN FLAT BAD: A LITTLE
MOVING TO AND FROM BED TO CHAIR: A LITTLE
WALKING IN HOSPITAL ROOM: A LOT
STANDING UP FROM CHAIR USING ARMS: A LOT
MOVING TO AND FROM BED TO CHAIR: A LITTLE
DRESSING REGULAR UPPER BODY CLOTHING: A LITTLE
CLIMB 3 TO 5 STEPS WITH RAILING: TOTAL
WALKING IN HOSPITAL ROOM: A LOT
TURNING FROM BACK TO SIDE WHILE IN FLAT BAD: A LITTLE
DAILY ACTIVITIY SCORE: 20
MOBILITY SCORE: 15
DAILY ACTIVITIY SCORE: 20
HELP NEEDED FOR BATHING: A LITTLE
MOBILITY SCORE: 15

## 2024-10-19 NOTE — CARE PLAN
Patient will remain HDS and VSS by 10/19/24 at 0700      Problem: Safety - Adult  Goal: Free from fall injury  10/19/2024 0604 by Olga Menendez RN  Outcome: Progressing  10/19/2024 0604 by Olga Menendez RN  Outcome: Progressing     Problem: Skin  Goal: Decreased wound size/increased tissue granulation at next dressing change  10/19/2024 0604 by Olga Menendez RN  Outcome: Progressing  Flowsheets (Taken 10/15/2024 1927 by Kamini Rhodes RN)  Decreased wound size/increased tissue granulation at next dressing change: Promote sleep for wound healing  10/19/2024 0604 by Olga Menendez RN  Outcome: Progressing  Flowsheets (Taken 10/15/2024 1927 by Kamini Rhodes RN)  Decreased wound size/increased tissue granulation at next dressing change: Promote sleep for wound healing  Goal: Participates in plan/prevention/treatment measures  10/19/2024 0604 by Olga Menendez RN  Outcome: Progressing  Flowsheets (Taken 10/13/2024 0707 by Kamini Rhodes RN)  Participates in plan/prevention/treatment measures: Elevate heels  10/19/2024 0604 by Olga Menendez RN  Outcome: Progressing  Flowsheets (Taken 10/13/2024 0707 by Kamini Rhodes RN)  Participates in plan/prevention/treatment measures: Elevate heels  Goal: Prevent/manage excess moisture  10/19/2024 0604 by Olga Menendez RN  Outcome: Progressing  Flowsheets (Taken 10/16/2024 2123 by Kelin Rivera RN)  Prevent/manage excess moisture: Cleanse incontinence/protect with barrier cream  10/19/2024 0604 by Olga Menendez RN  Outcome: Progressing  Flowsheets (Taken 10/16/2024 2123 by Kelin Rivera, RN)  Prevent/manage excess moisture: Cleanse incontinence/protect with barrier cream  Goal: Prevent/minimize sheer/friction injuries  10/19/2024 0604 by Olga Menendez RN  Outcome: Progressing  10/19/2024 0604 by Olga Menendez RN  Outcome: Progressing  Flowsheets (Taken 10/19/2024 0604)  Prevent/minimize sheer/friction injuries: Use pull sheet  Goal: Promote/optimize  nutrition  10/19/2024 0604 by Olga Menendez RN  Outcome: Progressing  Flowsheets (Taken 10/19/2024 0604)  Promote/optimize nutrition: Offer water/supplements/favorite foods  10/19/2024 0604 by Olga Menendez RN  Outcome: Progressing  Flowsheets  Taken 10/19/2024 0604 by Olga Menendez RN  Promote/optimize nutrition: Offer water/supplements/favorite foods  Taken 10/13/2024 0707 by Kamini Rhodes RN  Promote/optimize nutrition: Offer water/supplements/favorite foods  Goal: Promote skin healing  10/19/2024 0604 by Olga Menendez RN  Outcome: Progressing  Flowsheets (Taken 10/13/2024 0707 by Kamini Rhodes RN)  Promote skin healing: Assess skin/pad under line(s)/device(s)  10/19/2024 0604 by Olga Menendez RN  Outcome: Progressing  Flowsheets (Taken 10/13/2024 0707 by Kamini Rhodes RN)  Promote skin healing: Assess skin/pad under line(s)/device(s)     Problem: Knowledge Deficit  Goal: Patient/family/caregiver demonstrates understanding of disease process, treatment plan, medications, and discharge instructions  10/19/2024 0604 by Olga Menendez RN  Outcome: Progressing  10/19/2024 0604 by Olga Menendez RN  Outcome: Progressing     Problem: Pain  Goal: Takes deep breaths with improved pain control throughout the shift  10/19/2024 0604 by Olga Menendez RN  Outcome: Progressing  10/19/2024 0604 by Olga Menendez RN  Outcome: Progressing  Goal: Turns in bed with improved pain control throughout the shift  10/19/2024 0604 by Olga Menendez RN  Outcome: Progressing  10/19/2024 0604 by Olga Menendez RN  Outcome: Progressing  Goal: Walks with improved pain control throughout the shift  10/19/2024 0604 by Olga Menendez RN  Outcome: Progressing  10/19/2024 0604 by Olga Menendez RN  Outcome: Progressing  Goal: Performs ADL's with improved pain control throughout shift  10/19/2024 0604 by Olga Menendez RN  Outcome: Progressing  10/19/2024 0604 by Olga Menendez RN  Outcome: Progressing  Goal:  Participates in PT with improved pain control throughout the shift  10/19/2024 0604 by Olga Menendez RN  Outcome: Progressing  10/19/2024 0604 by Olga Menendez RN  Outcome: Progressing  Goal: Free from opioid side effects throughout the shift  10/19/2024 0604 by Olga Menendez RN  Outcome: Progressing  10/19/2024 0604 by Olga Menendez RN  Outcome: Progressing  Goal: Free from acute confusion related to pain meds throughout the shift  10/19/2024 0604 by Olga Menendez RN  Outcome: Progressing  10/19/2024 0604 by Olga Menendez RN  Outcome: Progressing     Problem: Nutrition  Goal: Less than 5 days NPO/clear liquids  10/19/2024 0604 by Olga Menendez RN  Outcome: Progressing  10/19/2024 0604 by Olag Menendez RN  Outcome: Progressing  Goal: Oral intake greater than 50%  10/19/2024 0604 by Olga Menendez RN  Outcome: Progressing  10/19/2024 0604 by Olga Menendez RN  Outcome: Progressing  Goal: Oral intake greater 75%  10/19/2024 0604 by Olga Menendez RN  Outcome: Progressing  10/19/2024 0604 by Olga Menendez RN  Outcome: Progressing  Goal: Consume prescribed supplement  10/19/2024 0604 by Olga Menendez RN  Outcome: Progressing  10/19/2024 0604 by Olga Menendez RN  Outcome: Progressing  Goal: Adequate PO fluid intake  10/19/2024 0604 by Olga Menendez RN  Outcome: Progressing  10/19/2024 0604 by Olga Menendez RN  Outcome: Progressing  Goal: Nutrition support goals are met within 48 hrs  10/19/2024 0604 by Olga Menendez RN  Outcome: Progressing  10/19/2024 0604 by Olga Menendez RN  Outcome: Progressing  Goal: Nutrition support is meeting 75% of nutrient needs  10/19/2024 0604 by Olga Menendez RN  Outcome: Progressing  10/19/2024 0604 by Olga Menendez RN  Outcome: Progressing  Goal: BG  mg/dL  10/19/2024 0604 by Olga Menendez RN  Outcome: Progressing  10/19/2024 0604 by Olga Menendez RN  Outcome: Progressing  Goal: Lab values WNL  10/19/2024 0604 by Olga  MILDRED Menendez RN  Outcome: Progressing  10/19/2024 0604 by Olga Menendez RN  Outcome: Progressing  Goal: Electrolytes WNL  10/19/2024 0604 by Olga Menendez RN  Outcome: Progressing  10/19/2024 0604 by Olga Menendez RN  Outcome: Progressing  Goal: Promote healing  10/19/2024 0604 by Olga Menendez RN  Outcome: Progressing  10/19/2024 0604 by Olga Menendez RN  Outcome: Progressing  Goal: Maintain stable weight  10/19/2024 0604 by Olga Menendez RN  Outcome: Progressing  10/19/2024 0604 by Olga Menendez RN  Outcome: Progressing  Goal: Reduce weight from edema/fluid  10/19/2024 0604 by Olga Menendez RN  Outcome: Progressing  10/19/2024 0604 by Olga Menendez RN  Outcome: Progressing  Goal: Gradual weight gain  10/19/2024 0604 by Olga Menendez RN  Outcome: Progressing  10/19/2024 0604 by Olga Menendez RN  Outcome: Progressing

## 2024-10-19 NOTE — PROGRESS NOTES
"Senait Narvaez is a 54 y.o. female on day 39 of admission presenting with Sickle cell disease with crisis and other complication.    Subjective   No acute events overnight. Patient denied, nausea, vomiting, fever, and cough. Significant lower extremity pain, tender to palpitation.         Objective     Physical Exam  Constitutional:       General: She is not in acute distress.     Appearance: She is obese. She is not toxic-appearing.   HENT:      Head: Normocephalic and atraumatic.   Eyes:      General: No scleral icterus.     Extraocular Movements: Extraocular movements intact.   Cardiovascular:      Rate and Rhythm: Normal rate and regular rhythm.   Pulmonary:      Effort: No respiratory distress.   Abdominal:      General: Abdomen is flat.      Palpations: Abdomen is soft.      Tenderness: There is no abdominal tenderness.   Musculoskeletal:         General: Swelling (L ankle) and tenderness (L ankle) present.   Skin:     Coloration: Skin is not jaundiced.      Findings: No bruising or erythema.   Neurological:      Mental Status: She is oriented to person, place, and time. Mental status is at baseline.         Last Recorded Vitals  Blood pressure 116/72, pulse 88, temperature 36.4 °C (97.5 °F), temperature source Temporal, resp. rate 18, height 1.651 m (5' 5\"), weight 113 kg (248 lb 7.3 oz), SpO2 100%.  Intake/Output last 3 Shifts:  I/O last 3 completed shifts:  In: 2985 (26.5 mL/kg) [P.O.:2485; IV Piggyback:500]  Out: 1225 (10.9 mL/kg) [Urine:1225 (0.3 mL/kg/hr)]  Weight: 112.7 kg         Assessment/Plan   Assessment & Plan  Sickle cell disease with crisis and other complication  Senait Narvaez is a 54 year-old female with a PMHx of Hgb SC disease (previously on exchange transfusions q4-6 weeks, last transfusion 3/1/24), hx of SVC syndrome, recurrent DVT/PE (on lifelong Coumadin), pHTN (6/2023), cauda equina with saddle numbness, hx SVT, fibromyalgia, Raynaud's disease, CKD II (baseline SCr~<2) and CAN who presented " to OSH ED for diffuse pain and lethargy, then transferred to  MICU for hemodynamic instability. Patient was subsequently intubated due to worsening lethargic and lack of respiratory compensation from significant metabolic acidosis with concern for ACS. Vancomycin and Zosyn started for empiric coverage, patient also started on pressors for BP support. Patient was transfused 2 units pRBCs and then underwent RBCEx on 9/11. Cardiology consulted for c/f NSTEMI vs demand ischemia (ST depressions and elevated troponin), rec treat as ACS. Course further c/b severe CHARLES with peak sCr of 5.4, and acute liver injury with severely elevated liver enzymes (ALT 4119, AST >10k). Liver US only remarkable for fatty infiltration, viral hepatitis panel negative. CHARLES and liver injury improved with supportive management. Pruritic rash noted 9/14, Derm consulted and took multiple biopsies 9/15. Rash then began to worsen to involve the groin, lateral thigh, and scalp with numerous tense bullae. Discontinued heparin given concerns for potential HIT, started bivalirudin. However PF4 was negative. Patient was extubated on 9/16/2024. Started stress dose steroids given continued pressor requirements, pressors Dc'd 9/17.      Patient transferred to [facility] 9/17, PT/OT rec SNF. Coumadin bridge was started 9/18. Given persistent rash with unremarkable labs, derm re-biopsied on 9/18, findings ultimately felt to be most c/w fixed drug eruption. Dermatology contacted for bx results (9/20) rec triamcinolone cream BID and interdry cloths. Leukocytosis uptrending 9/21, repeat infectious workup negative (CXR, Abd XR, blood cultures, MRSA negative 9/21). UA with leuks. On 9/25, per nursing and patient, rash seems to be worsening. Derm re-engaged recommended to apply vasoline to all eroded/denuded areas and continue triamcinolone cream to both the black plaques as well as the red areas across the trunk and thighs. Per dermatology, erythema is mild and not  palpable, it is most likely part of the drug eruption the patient has previously been known to have. Wound care following. For warfarin dosing, per pharmacy, if INR between 2-3 can restart home warfarin 5mg daily, if INR < 2 restart bival. INR 3.4 on 9/25, warfarin stopped, repeat INR 2.8 9/26 and warfarin restarted. On 9/26, increased PO oxycodone and discontinued IV dilaudid in anticipation for discharge. Overnight on 9/29, brain attack called out of concern for change in mental status, no focal deficits noted, BAT cancelled and narcan administered x3 with improvement of mental status. Mentation improved as of 10/2, small dose oxy resumed. INR supratherapeutic 10/3 at 6.7, 5mg IV Vit K x1 given (ok per pharmacy) I/s/o ?bleeding from vagina vs wound excoriation and UA with +blood. INR  down to 1.5 (10/4). Plan for RBCEx prior to dc per Dr. Whaley, planned 10/8. 10/5 started heparin drip (per attending) given normal INR while pending apheresis line placement (previous HIT workup with negative PF4). 10/7 started bridge back to coumadin with lovenox after apheresis line. Continue therapeutic Lovenox 120mg BID with Coumadin 5mg nightly x5 days and until INR is in therapeutic range x24 hours. 10/5 ordered duplex BLE as pt c/o severe LLE pain and swelling which was negative. Left ankle x-ray (10/8) negative for acute fractures, showed soft tissue edema. CT of left ankle (10/13) with diffuse nonspecific edema without fluid collection, may represent vascular congestion, no acute osseous abnormalities, no AVN. Uric acid elevated to 7.9, started colchicine 10/15 for acute gout flare. 10/15, noted hardening of left breast and skin changes, after discussion with attending and Dr. Whaley US Bl breast and mammogram ordered. Mammogram and US negative for acute abnormalities. On 10/16, pt c/o increased numbness and decreased sensation to plantar side of left foot most likely from nerve impingement I/s/o increased LE edema with no  change in ankle pain since starting colchicine. 10/16, Colchicine was discontinued and indomethacin was started BID for gout pain. Indomethacin was held 10/18 2/2 CHARLES, though pain was improving. INR elevated to 3.6 10/18, dose held and planning for half dose on 10/19. DC to SNF pending improvement of CHARLES and INR.     Today:  -CHARLES still not improving. Cr plateauing  -INR supratheraputic   - PVR, Urine lytes pending     # Left LE pain   # left plantar toes and arch numbness   # acute gout   - 10/5 ordered duplex BLE as pt c/o severe LLE pain and swelling which was negative  - Left ankle x-ray (10/8) negative for acute fractures, showed soft tissue edema  - CT of left ankle (10/13) with diffuse nonspecific edema without fluid collection, may represent vascular congestion, no acute osseous abnormalities, no AVN  - Uric acid elevated to 7.9 (10/15)   - s/p 1.2 mg loading dose of colchicine 10/15 and 0.6mg colchicine 10/16 AM without improvement of pain, per UTD and pharmacy, best to start colchicine 24-36 hours with initial flare, since pain started earlier this week, difficult to assess if colchicine will improve symptoms   - 10/16 c/o Left plantar side of toes and arch of foot with numbness/tingling. Decreased sensation reported in AM. All other neuro assessment WNL c/f nerve impingement from increased swelling in leg earlier this week   - stopped colchicine 10/16 and started 75mg indomethacin BID for gout pain and new concerns for tarsal tunnel syndrome   - Fructosamine pending 10/16 I/s/o family history of DM and decrease sensation/ numbness on plantar side of left foot. A1C to r/o DM will not be accurate I/s/o sickle cell disease   - s/p additional 40mg IV lasix 10/15, 10/16, and additional 20mg IV lasix given 10/17 per attending physician   - 10/17, pain is improving and more localized to left lateral malleolus and bunion, will continue treatment as above and if symptoms continue to improve can be discharged 10/18  or 10/19 pending precert   - PT to re-evaluate 10/16, OT to see pt on 10/17 for precert re-submission     # CHARLES on CKD II  - Baseline ~ SCr <2, peak 5.4 this admission. 9/21 sCr 1.58 --> 1.13 (9/25)--> 1.14 (10/3)--> 0.96 (10/6) -->  1.01 (10/14) --> 0.99 (10/15) --> 1.07 (10/16) --> 1.39 (10/17)   - Recent increase in sCr most likely I/s/o IV lasix given (10/14-10/17), sCr still below BL as of 10/17   - Likely pre-renal vs ATN given persistant hypotension over admission, on CKD II, now polyuric phase  - FeNA 0.4%  - Severe hyperkalemia with previous peaked T waves on EKG, resolved  - Nephrology following while in ICU, now signed off   - s/p multiple K cocktails in MICU  - sCr elevated to 1.93 on 10/18; likely multifactorial r/t lasix 10/17 and indomethacin  - s/p 500cc LR bolus 10/18, will recheck RFP 10/18 evening   - Encourage increased PO intake, strict I&Os Q6hrs   - PVR and urine lytes pending    # DVT/ PE/ SVC Thrombus  - Hx SVC stricture s/p SVC angioplasty  - B/l Upper Extremity and Facial Swelling d/t partial filling defect within the SVC and a thrombus of the SVC complicated by bilateral Mediport catheters. On lifelong anticoagulation, DOAC discouraged due to high risk of clotting   - Home Coumadin 5mg daily initially HELD on admission to MICU 2/2 unstable course  - Restarted coumadin 9/18 PM, will plan to bridge with bival, Ok'd by heme   - 9/20 INR 3.8, bival and home warfarin held. Pharmacy rec repeat INR 4 hours after bival was held. If the INR < 2, we can restart the bival, but if the INR 2-3, we can continue with warfarin monotherapy   - 9/20 repeat INR 3.7, pharmacy rec continuing to hold both medications  - 9/21 INR 3.2, per pharmacy - get repeat INR in AM 9/22, if INR between 2-3 can restart home warfarin 5mg daily  -INR 2.6 (9/24) after restarting Warfarin 5mg, will maintain INR goal of 2-3  - INR 3.4 (9/25). Held warfarin on 9/25. Repeat INR 9/26. Plan to restart if <3   - INR 2.8 (9/26),  restarted warfarin, recheck INR 9/27   - INR 4.0 (9/30), held warfarin, repeat INR 10/1 ordered, plan to restart if <3  - INR 6.8 (10/1), patient without any active bleeding, per attending and pharmacy, warfarin held   - INR 6.8 (10/2), patient without any active bleeding, per attending and pharmacy, warfarin held   - INR 6.7 (10/3), cont to hold warfarin. Ordered 5mg IV Vit K x1 (ok per pharmacy) d/t ?bleeding, vaginal vs wound excoriation and UA +blood  - 10/4 INR normalized to 1.6, continue to hold warfarin  - 10/5 pt c/o severe LLE pain and swelling; duplex BLE negative  - 10/5 INR 1.2; started heparin drip given normal INR while pending apheresis line placement (previous HIT workup with negative PF4)--> held 10/7 at 0400 for apheresis line placement  - 10/7 INR 1.2--> started bridge back to coumadin tonight with lovenox. Started after apheresis line placement will start coumadin 5mg and start therapeutic lovenox 120mg BID x5 days AND until INR therepeutic x24 hours (regimen discussed with pharmacy)  - 10/10 INR 1.6   - Home coumadin 5mg daily dose resumed 10/12  - 10/13 INR 1.9   - INR therapeutic 10/14--10/17  - INR 10/18 3.6, dose held; plan for 2.5mg dose on 10/19   - Follows with Coumadin clinic outpatient  - of note, SNF can check INR as little or as often as needed.      # Left breast skin changes - improved   - Noted hardening and skin changes to left breast on 10/15 around site of previous infection. Non-erythematous, no purulence (see below)   - After discussion with Dr. Whaley on 10/15, he recommended obtaining US   - Breast US limited left and Bl mammogram complete 10/16; Marked left breast diffuse edema, suggestive of vaso-occlusive etiology. Clinical correlation and management till resolution is  recommended. No mammographic or targeted sonographic evidence of malignancy.     # Waxing and waning AMS---Resolved   - Felt to be due to opioids i/s/o worsening renal function and c/b respiratory  acidosis  - All opioids on hold (9/28-10/2)  - 10/2 resumed oxycodone 10mg q3 hours as AMS now resolved   - continue to hold extended release oxycontin   - Keep supplemental O2 on board, but titrate to maintain SPO2 of 90-92%   - On CPAP at home, enforce nightly use to extent possible; may need BiPAP if unresolved     # Purpuric rash, suspected fixed drug eruption  # Concern for possible heparin-induced thrombocytopenia (HIT) -resolved  - Purple, reticular pattern of plaques noted around the bilateral breasts, scalp and groin,  with tense bullae. Non-erythematous, no purulence  - Some initial concern for possible heparin-induced thrombocytopenia with concurrent skin findings. Discontinued heparin 9/16 and transitioned to bivalirudin  - 4 T score 0, PF4 w/ reflex BINA negative. Hematology ultimately consulted, felt there was low concern for HIT and signed off  - Differential diagnosis includes purpura secondary to infection versus coagulopathy versus vasculopathy versus small and/or medium vessel vasculitis versus calciphylaxis vs drug rash  - Dermatology consulted, work-up to date:  - Low Protein C activity  - ANCA, PR3 and MPO negative  - ISABELLE neg  - cryoglobulin labs- not enough specimen to analyze  - S/p skin punch biopsies x 2 9/15 and one on 9/18, showed SUPERFICIAL EPIDERMAL DEGENERATION WITH ERYTHROCYTE EXTRAVASATION WITH NEUTROPHILS. These findings could be seen secondary to an older trauma, or resolving erythema multiforme, a fixed drug eruption, Edgar-Christopher syndrome, or toxic epidermal necrolysis. Favor Multifocal Fixed Drug Eruption    - Dermatology contacted (9/20) about results, recommended adding vancomycin to allergy list and starting triamcinolone  - On 9/25, concern from pt and nursing that rash is not improving. Under breasts and groin still causing pain. Bilateral upper thighs and forehead rash improving   - Wound care re-consulted 9/25   - Re-engaged dermatology on 9/25 given worsening rash;  recommended to apply vasoline to all eroded/denuded areas and continue triamcinolone cream to both the black plaques as well as the red areas across the trunk and thighs. 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     # Leukocytosis-- resolved   - Likely I/s/o rash vs reactive vs infectious process  - Leukocytosis noted on admission to MICU (WBC 24.8) --> 12.7 (9/25) --> 8.3 (10/3) --> as of 10/14, leukocytosis continues to be resolved   - Blood cultures 9/10, 9/12, 9/15 all NGTD  - Strep and Legionella Ag, MRSA nares negative  - CXR (9/21) largely unremarkable, again re-demonstrated perihilar prominence seen on prior XR  - Abdominal xray (9/21) unremarkable  - Repeat blood cultures x 2 (9/21) NGTD  - UA (9/21) with 500 LE  - UA (9/30) +bacteria, +leuks, urine culture (9/29) mult organisms  - UA sent 10/3 with +blood, leuks, and WBC- not reflex to cx so UCX added on to 10/3 UA and again showed multiple organisms  - Will hold off tx for UTI as pt asymptomatic and leukocytosis improved. 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  # ACS  - Previously managed through routine RBC exchanges q6 weeks, most recent RBCEx 3/1/24, per patient pausing on transfusions for time being  - OARRS reviewed, no aberrant behavior noted  - LDH>12,000 (now downtrending), haptoglobin <30, fibrinogen 318, retic % 5.2 on presentation, bili 10.7  - Leukocytosis 24.8 on admission to MICU  - CXR (9/10) persistent atelectasis/scarring in the left lower lung  - CT CAP (9/11) Diffuse mosaic attenuation of the lung parenchyma, which can be seen in the setting of small airway disease, pulmonary edema or acute infection  - Intubated upon arrival to MICU, now s/p extubation 9/16  - s/p Vanc and Zosyn (finished both courses prior to floor transfer)  - Procal elevated 7.09 (9/11)  - Blood cultures 9/10, 9/12, 9/15 all NGTD  -  Strep and Legionella Ag, MRSA nares negative  - Trop peak 1431, cards rec treat as ACS (see below)  - S/p 2u pRBCs 9/10 on arrival to MICU  - S/p exchange transfusion 9/11 AM for ACS  - S/p routine exchange transfusion 10/8 per Dr. Whaley   - Care plan from 12/6/23- For mild to moderate pain: Dilaudid 0.5mg IVP q3h as needed, for moderate to severe pain Dilaudid 1- 1.5mg q3h PRN  - On arrival to [facility], started on 0.6mg dilaudid IVP q3h PRN severe pain   - s/p 0.6mg dilaudid IVP q4h PRN breakthrough and 15mg PO oxycodone Q4 hrs severe pain (9/25)  - Per discussion with Dr. Whaley (9/25), okay to increase PO oxycodone to 15-20mg Q4hr to optimize pain for discharge to SNF.   - On 9/26, discontinued IV dilaudid and increased to 20mg PO oxycodone q4hrs for severe pain   - Opioids on hold as of 9/28 d/t AMS- resumed 2.5mg oxy q4hrs PRN severe pain on 10/2. ER oxy 10mg BID still held  - (10/5- current) increased pain reg as AMS resolved--> continue oxycodone 10mg q3hrs PRN severe pain  - Continue to hold ER Oxycontin indefinitely as was major contributor to AMS   - Bowel regimen for opioid induced constipation with Senna 2tabs BID, Miralax BID, and dulcolax suppository 10mg PRN constipation  - Zofran for opioid induced nausea, no benadryl ordered (assumed from previous AMS but also no reports of opioid-induced pruritus)  - Continue home Folic Acid 1mg daily, continue thiamine 100mg daily  - Removed femoral line 10/15      # ST depression with tropinemia, suspected 2/2 demand ischemia iso ACS  # Hx SVT  - Elevated troponins on admission, peak 1431 and now downtrending   - EKG with ST depressions, likely Type II MI due to vaso-occlusive crisis and anemia  - TTE 9/11: EF 60-65%, RV enlarged and reduced in function which appears stable when compared to last TTE (1/2024)  - Troponin leak is likely due to cardiac demand vs supply mismatch in the setting of worsening anemia and vaso-occlusive crisis  - Cardiology consulted, now signed  off with indication to treat troponemia as ACS  - Lasix held in MICU 2/2 hypotension and CHARLES, can resume as able   - Metop 12.5mg BID held in MICU 2/2 hypotension- BP and HR continue to remain stable, holding off on restarting for now  - s/p 1x dose of po lasix 20mg on 10/9 and s/p IVP 20mg lasix on 10/10 for volume overload, plan to resume home dose lasix once LE edema back to patient's baseline   - home dose of lasix of 20mg po every other day resumed on 10/12  - 10/14 s/p 1x dose of IVP lasix for increased LE edema   - 10/15 and 10/16 s/p 1x dose of 40mg IVP lasix for increased LE edema   - 10/17 s/p 20mg IV lasix per attending physician Dr. Hensley with evening RFP to assess sCr   - Follow-up with Cardiology outpt, FUV 2/13/25     # Acute liver injury, improving  # Direct hyperbilirubinuria, improving  - Suspected 2/2 hemodynamic instability versus sickle cell crisis  - On presentation to  MICU ALT 2611, AST 4727, T bili 10.2, INR 3.0; continues to improve  - CT with hepatic venous congestion, patient lacks gallbladder  - Liver US with Doppler: Diffuse fatty infiltration, unremarkable doppler interrogation of the liver  - EBV IgG positive, IgM negative  - CMV IgG positive, PCR negative  - Acute hepatitis panel unremarkable  - Hepatology initially following, now signed off  - Will continue to monitor, trend daily CMP     # pHTN   - Initial c/f CTEPH as imaging findings with dilated PA, filling defects, and mosaicism  - VQ scan (6/13/23) with non anatomical basal defects and RUL defect due to scar--> not favoring CTEPH per Dr. Yi and Dr. 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 Delacruz as outpatient  - no current issues      # DISPO  - Full code- confirmed on admission  - NOK: Tati Salgado (mother) (#[phone]); updated @ bedside and discussed dc plan on 10/17   - PT/OT rec SNF, plan for rehab at [facility]  - DC to SNF pending pre-cert re-approval; most likely 10/18 or 10/19   - Derm FUV 11/7, Sickle Cell FUV 11/18, Cardiology FUV 2/13         I spent >60 minutes in the professional and overall care of this patient.      Ruddy Cope MD

## 2024-10-19 NOTE — SIGNIFICANT EVENT
Signed out to ensure patient uses CPAP at night. Patient reported that she uses a nasal mask at home and the full face mask makes her feel anxious. Per RT, no nasal mask available. Patient requested increase in O2 at night and not to use CPAP.

## 2024-10-19 NOTE — ASSESSMENT & PLAN NOTE
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 Patient was subsequently intubated due to worsening lethargic and lack of respiratory compensation from significant metabolic acidosis with concern for ACS. Vancomycin and Zosyn started for empiric coverage, patient also started on pressors for BP support. Patient was transfused 2 units pRBCs and then underwent RBCEx on 9/11. Cardiology consulted for c/f NSTEMI vs demand ischemia (ST depressions and elevated troponin), rec treat as ACS. Course further c/b severe CHARLES with peak sCr of 5.4, and acute liver injury with severely elevated liver enzymes (ALT 4119, AST >10k). Liver US only remarkable for fatty infiltration, viral hepatitis panel negative. CHARLES and liver injury improved with supportive management. Pruritic rash noted 9/14, Derm consulted and took multiple biopsies 9/15. Rash then began to worsen to involve the groin, lateral thigh, and scalp with numerous tense bullae. Discontinued heparin given concerns for potential HIT, started bivalirudin. However PF4 was negative. Patient was extubated on 9/16/2024. Started stress dose steroids given continued pressor requirements, pressors Dc'd 9/17.      Patient transferred to [facility] 9/17, PT/OT rec SNF. Coumadin bridge was started 9/18. Given persistent rash with unremarkable labs, derm re-biopsied on 9/18, findings ultimately felt to be most c/w fixed drug eruption. Dermatology contacted for bx results (9/20) rec triamcinolone cream BID and interdry cloths. Leukocytosis uptrending 9/21, repeat infectious workup negative (CXR, Abd XR, blood  cultures, MRSA negative 9/21). UA with leuks. On 9/25, per nursing and patient, rash seems to be worsening. Derm re-engaged recommended to apply vasoline to all eroded/denuded areas and continue triamcinolone cream to both the black plaques as well as the red areas across the trunk and thighs. 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 Wound care following. For warfarin dosing, per pharmacy, if INR between 2-3 can restart home warfarin 5mg daily, if INR < 2 restart bival. INR 3.4 on 9/25, warfarin stopped, repeat INR 2.8 9/26 and warfarin restarted. On 9/26, increased PO oxycodone and discontinued IV dilaudid in anticipation for discharge. Overnight on 9/29, brain attack called out of concern for change in mental status, no focal deficits noted, BAT cancelled and narcan administered x3 with improvement of mental status. Mentation improved as of 10/2, small dose oxy resumed. INR supratherapeutic 10/3 at 6.7, 5mg IV Vit K x1 given (ok per pharmacy) I/s/o ?bleeding from vagina vs wound excoriation and UA with +blood. INR  down to 1.5 (10/4). Plan for RBCEx prior to dc per Dr. Whaley, planned 10/8. 10/5 started heparin drip (per attending) given normal INR while pending apheresis line placement (previous HIT workup with negative PF4). 10/7 started bridge back to coumadin with lovenox after apheresis line. Continue therapeutic Lovenox 120mg BID with Coumadin 5mg nightly x5 days and until INR is in therapeutic range x24 hours. 10/5 ordered duplex BLE as pt c/o severe LLE pain and swelling which was negative. Left ankle x-ray (10/8) negative for acute fractures, showed soft tissue edema. CT of left ankle (10/13) with diffuse nonspecific edema without fluid collection, may represent vascular congestion, no acute osseous abnormalities, no AVN. Uric acid elevated to 7.9, started colchicine 10/15 for acute gout flare. 10/15, noted  hardening of left breast and skin changes, after discussion with attending and Dr. Whaley US Bl breast and mammogram ordered. Mammogram and US negative for acute abnormalities. On 10/16, pt c/o increased numbness and decreased sensation to plantar side of left foot most likely from nerve impingement I/s/o increased LE edema with no change in ankle pain since starting colchicine. 10/16, Colchicine was discontinued and indomethacin was started BID for gout pain. Indomethacin was held 10/18 2/2 CHARLES, though pain was improving. INR elevated to 3.6 10/18, dose held and planning for half dose on 10/19. DC to SNF pending improvement of CHARLES and INR.     Today:  -CHARLES still not improving. Cr plateauing  -INR supratheraputic   - PVR, Urine lytes pending     # Left LE pain   # left plantar toes and arch numbness   # acute gout   - 10/5 ordered duplex BLE as pt c/o severe LLE pain and swelling which was negative  - Left ankle x-ray (10/8) negative for acute fractures, showed soft tissue edema  - CT of left ankle (10/13) with diffuse nonspecific edema without fluid collection, may represent vascular congestion, no acute osseous abnormalities, no AVN  - Uric acid elevated to 7.9 (10/15)   - s/p 1.2 mg loading dose of colchicine 10/15 and 0.6mg colchicine 10/16 AM without improvement of pain, per UTD and pharmacy, best to start colchicine 24-36 hours with initial flare, since pain started earlier this week, difficult to assess if colchicine will improve symptoms   - 10/16 c/o Left plantar side of toes and arch of foot with numbness/tingling. Decreased sensation reported in AM. All other neuro assessment WNL c/f nerve impingement from increased swelling in leg earlier this week   - stopped colchicine 10/16 and started 75mg indomethacin BID for gout pain and new concerns for tarsal tunnel syndrome   - Fructosamine pending 10/16 I/s/o family history of DM and decrease sensation/ numbness on plantar side of left foot. A1C to r/o DM will  not be accurate I/s/o sickle cell disease   - s/p additional 40mg IV lasix 10/15, 10/16, and additional 20mg IV lasix given 10/17 per attending physician   - 10/17, pain is improving and more localized to left lateral malleolus and bunion, will continue treatment as above and if symptoms continue to improve can be discharged 10/18 or 10/19 pending precert   - PT to re-evaluate 10/16, OT to see pt on 10/17 for precert re-submission     # CHARLES on CKD II  - Baseline ~ SCr <2, peak 5.4 this admission. 9/21 sCr 1.58 --> 1.13 (9/25)--> 1.14 (10/3)--> 0.96 (10/6) -->  1.01 (10/14) --> 0.99 (10/15) --> 1.07 (10/16) --> 1.39 (10/17)   - Recent increase in sCr most likely I/s/o IV lasix given (10/14-10/17), sCr still below BL as of 10/17   - Likely pre-renal vs ATN given persistant hypotension over admission, on CKD II, now polyuric phase  - FeNA 0.4%  - Severe hyperkalemia with previous peaked T waves on EKG, resolved  - Nephrology following while in ICU, now signed off   - s/p multiple K cocktails in MICU  - sCr elevated to 1.93 on 10/18; likely multifactorial r/t lasix 10/17 and indomethacin  - s/p 500cc LR bolus 10/18, will recheck RFP 10/18 evening   - Encourage increased PO intake, strict I&Os Q6hrs   - PVR and urine lytes pending    # DVT/ PE/ SVC Thrombus  - Hx SVC stricture s/p SVC angioplasty  - B/l Upper Extremity and Facial Swelling d/t partial filling defect within the SVC and a thrombus of the SVC complicated by bilateral Mediport catheters. On lifelong anticoagulation, DOAC discouraged due to high risk of clotting   - Home Coumadin 5mg daily initially HELD on admission to MICU 2/2 unstable course  - Restarted coumadin 9/18 PM, will plan to bridge with bival, Ok'd by heme   - 9/20 INR 3.8, bival and home warfarin held. Pharmacy rec repeat INR 4 hours after bival was held. If the INR < 2, we can restart the bival, but if the INR 2-3, we can continue with warfarin monotherapy   - 9/20 repeat INR 3.7, pharmacy rec  continuing to hold both medications  - 9/21 INR 3.2, per pharmacy - get repeat INR in AM 9/22, if INR between 2-3 can restart home warfarin 5mg daily  -INR 2.6 (9/24) after restarting Warfarin 5mg, will maintain INR goal of 2-3  - INR 3.4 (9/25). Held warfarin on 9/25. Repeat INR 9/26. Plan to restart if <3   - INR 2.8 (9/26), restarted warfarin, recheck INR 9/27   - INR 4.0 (9/30), held warfarin, repeat INR 10/1 ordered, plan to restart if <3  - INR 6.8 (10/1), patient without any active bleeding, per attending and pharmacy, warfarin held   - INR 6.8 (10/2), patient without any active bleeding, per attending and pharmacy, warfarin held   - INR 6.7 (10/3), cont to hold warfarin. Ordered 5mg IV Vit K x1 (ok per pharmacy) d/t ?bleeding, vaginal vs wound excoriation and UA +blood  - 10/4 INR normalized to 1.6, continue to hold warfarin  - 10/5 pt c/o severe LLE pain and swelling; duplex BLE negative  - 10/5 INR 1.2; started heparin drip given normal INR while pending apheresis line placement (previous HIT workup with negative PF4)--> held 10/7 at 0400 for apheresis line placement  - 10/7 INR 1.2--> started bridge back to coumadin tonight with lovenox. Started after apheresis line placement will start coumadin 5mg and start therapeutic lovenox 120mg BID x5 days AND until INR therepeutic x24 hours (regimen discussed with pharmacy)  - 10/10 INR 1.6   - Home coumadin 5mg daily dose resumed 10/12  - 10/13 INR 1.9   - INR therapeutic 10/14--10/17  - INR 10/18 3.6, dose held; plan for 2.5mg dose on 10/19   - Follows with Coumadin clinic outpatient  - of note, SNF can check INR as little or as often as needed.      # Left breast skin changes - improved   - Noted hardening and skin changes to left breast on 10/15 around site of previous infection. Non-erythematous, no purulence (see below)   - After discussion with Dr. Whaley on 10/15, he recommended obtaining US   - Breast US limited left and Bl mammogram complete 10/16; Marked  left breast diffuse edema, suggestive of vaso-occlusive etiology. Clinical correlation and management till resolution is  recommended. No mammographic or targeted sonographic evidence of malignancy.     # Waxing and waning AMS---Resolved   - Felt to be due to opioids i/s/o worsening renal function and c/b respiratory acidosis  - All opioids on hold (9/28-10/2)  - 10/2 resumed oxycodone 10mg q3 hours as AMS now resolved   - continue to hold extended release oxycontin   - Keep supplemental O2 on board, but titrate to maintain SPO2 of 90-92%   - On CPAP at home, enforce nightly use to extent possible; may need BiPAP if unresolved     # Purpuric rash, suspected fixed drug eruption  # Concern for possible heparin-induced thrombocytopenia (HIT) -resolved  - Purple, reticular pattern of plaques noted around the bilateral breasts, scalp and groin,  with tense bullae. Non-erythematous, no purulence  - Some initial concern for possible heparin-induced thrombocytopenia with concurrent skin findings. Discontinued heparin 9/16 and transitioned to bivalirudin  - 4 T score 0, PF4 w/ reflex BINA negative. Hematology ultimately consulted, felt there was low concern for HIT and signed off  - Differential diagnosis includes purpura secondary to infection versus coagulopathy versus vasculopathy versus small and/or medium vessel vasculitis versus calciphylaxis vs drug rash  - Dermatology consulted, work-up to date:  - Low Protein C activity  - ANCA, PR3 and MPO negative  - ISABELLE neg  - cryoglobulin labs- not enough specimen to analyze  - S/p skin punch biopsies x 2 9/15 and one on 9/18, showed SUPERFICIAL EPIDERMAL DEGENERATION WITH ERYTHROCYTE EXTRAVASATION WITH NEUTROPHILS. These findings could be seen secondary to an older trauma, or resolving erythema multiforme, a fixed drug eruption, Edgar-Christopher syndrome, or toxic epidermal necrolysis. Favor Multifocal Fixed Drug Eruption    - Dermatology contacted (9/20) about results,  recommended adding vancomycin to allergy list and starting triamcinolone  - On 9/25, concern from pt and nursing that rash is not improving. Under breasts and groin still causing pain. Bilateral upper thighs and forehead rash improving   - Wound care re-consulted 9/25   - Re-engaged dermatology on 9/25 given worsening rash; recommended to apply vasoline to all eroded/denuded areas and continue triamcinolone cream to both the black plaques as well as the red areas across the trunk and thighs. 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     # Leukocytosis-- resolved   - Likely I/s/o rash vs reactive vs infectious process  - Leukocytosis noted on admission to MICU (WBC 24.8) --> 12.7 (9/25) --> 8.3 (10/3) --> as of 10/14, leukocytosis continues to be resolved   - Blood cultures 9/10, 9/12, 9/15 all NGTD  - Strep and Legionella Ag, MRSA nares negative  - CXR (9/21) largely unremarkable, again re-demonstrated perihilar prominence seen on prior XR  - Abdominal xray (9/21) unremarkable  - Repeat blood cultures x 2 (9/21) NGTD  - UA (9/21) with 500 LE  - UA (9/30) +bacteria, +leuks, urine culture (9/29) mult organisms  - UA sent 10/3 with +blood, leuks, and WBC- not reflex to cx so UCX added on to 10/3 UA and again showed multiple organisms  - Will hold off tx for UTI as pt asymptomatic and leukocytosis improved. 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  # ACS  - Previously managed through routine RBC exchanges q6 weeks, most recent RBCEx 3/1/24, per patient pausing on transfusions for time being  - OARRS reviewed, no aberrant behavior noted  - LDH>12,000 (now downtrending), haptoglobin <30, fibrinogen 318, retic % 5.2 on presentation, bili 10.7  - Leukocytosis 24.8 on admission to MICU  - CXR (9/10) persistent atelectasis/scarring in the left lower lung  - CT CAP (9/11) Diffuse mosaic attenuation of  the lung parenchyma, which can be seen in the setting of small airway disease, pulmonary edema or acute infection  - Intubated upon arrival to MICU, now s/p extubation 9/16  - s/p Vanc and Zosyn (finished both courses prior to floor transfer)  - Procal elevated 7.09 (9/11)  - Blood cultures 9/10, 9/12, 9/15 all NGTD  - Strep and Legionella Ag, MRSA nares negative  - Trop peak 1431, cards rec treat as ACS (see below)  - S/p 2u pRBCs 9/10 on arrival to MICU  - S/p exchange transfusion 9/11 AM for ACS  - S/p routine exchange transfusion 10/8 per Dr. Whaley   - Care plan from 12/6/23- For mild to moderate pain: Dilaudid 0.5mg IVP q3h as needed, for moderate to severe pain Dilaudid 1- 1.5mg q3h PRN  - On arrival to [facility], started on 0.6mg dilaudid IVP q3h PRN severe pain   - s/p 0.6mg dilaudid IVP q4h PRN breakthrough and 15mg PO oxycodone Q4 hrs severe pain (9/25)  - Per discussion with Dr. Whaley (9/25), okay to increase PO oxycodone to 15-20mg Q4hr to optimize pain for discharge to SNF.   - On 9/26, discontinued IV dilaudid and increased to 20mg PO oxycodone q4hrs for severe pain   - Opioids on hold as of 9/28 d/t AMS- resumed 2.5mg oxy q4hrs PRN severe pain on 10/2. ER oxy 10mg BID still held  - (10/5- current) increased pain reg as AMS resolved--> continue oxycodone 10mg q3hrs PRN severe pain  - Continue to hold ER Oxycontin indefinitely as was major contributor to AMS   - Bowel regimen for opioid induced constipation with Senna 2tabs BID, Miralax BID, and dulcolax suppository 10mg PRN constipation  - Zofran for opioid induced nausea, no benadryl ordered (assumed from previous AMS but also no reports of opioid-induced pruritus)  - Continue home Folic Acid 1mg daily, continue thiamine 100mg daily  - Removed femoral line 10/15      # ST depression with tropinemia, suspected 2/2 demand ischemia iso ACS  # Hx SVT  - Elevated troponins on admission, peak 1431 and now downtrending   - EKG with ST depressions, likely Type II MI  due to vaso-occlusive crisis and anemia  - TTE 9/11: EF 60-65%, RV enlarged and reduced in function which appears stable when compared to last TTE (1/2024)  - Troponin leak is likely due to cardiac demand vs supply mismatch in the setting of worsening anemia and vaso-occlusive crisis  - Cardiology consulted, now signed off with indication to treat troponemia as ACS  - Lasix held in MICU 2/2 hypotension and CHARLES, can resume as able   - Metop 12.5mg BID held in MICU 2/2 hypotension- BP and HR continue to remain stable, holding off on restarting for now  - s/p 1x dose of po lasix 20mg on 10/9 and s/p IVP 20mg lasix on 10/10 for volume overload, plan to resume home dose lasix once LE edema back to patient's baseline   - home dose of lasix of 20mg po every other day resumed on 10/12  - 10/14 s/p 1x dose of IVP lasix for increased LE edema   - 10/15 and 10/16 s/p 1x dose of 40mg IVP lasix for increased LE edema   - 10/17 s/p 20mg IV lasix per attending physician Dr. Hensley with evening RFP to assess sCr   - Follow-up with Cardiology outpt, FUV 2/13/25     # Acute liver injury, improving  # Direct hyperbilirubinuria, improving  - Suspected 2/2 hemodynamic instability versus sickle cell crisis  - On [facility] ALT 2611, AST 4727, T bili 10.2, INR 3.0; continues to improve  - CT with hepatic venous congestion, patient lacks gallbladder  - Liver US with Doppler: Diffuse fatty infiltration, unremarkable doppler interrogation of the liver  - EBV IgG positive, IgM negative  - CMV IgG positive, PCR negative  - Acute hepatitis panel unremarkable  - Hepatology initially following, now signed off  - Will continue to monitor, trend daily CMP     # pHTN   - Initial c/f CTEPH as imaging findings with dilated PA, filling defects, and mosaicism  - VQ scan (6/13/23) with non anatomical basal defects and RUL defect due to scar--> not favoring CTEPH per Dr. Yi and Dr. Soto  - RHC 6/16/23 with mPA pressure 36, wedge 14,  CI 4.2  - Per inpatient note 6/16/23- No further work up for pulm hypertension at this time, however patient should be followed outpatient for pulmonary hypertension (with repeat interval echo), mosaicism on imaging (PFT to reevaluate for obstruction and small airway disease), and sleep apnea    - c/w home 2 L via CPAP at night   - 10/12: Restarted home Lasix 20 mg every other day  - Follows with pulmonology outpt     # CAN  - cont home CPAP   - cont home Albuterol PRN     # H/O Cauda Equine syndrome  - Follows Dr. Delacruz as outpatient  - no current issues      # DISPO  - Full code- confirmed on admission  - NOK: Tati Salgado (mother) (#[phone]); updated @ bedside and discussed dc plan on 10/17   - PT/OT rec SNF, plan for rehab at [facility]  - DC to SNF pending pre-cert re-approval; most likely 10/18 or 10/19   - Derm FUV 11/7, Sickle Cell FUV 11/18, Cardiology FUV 2/13

## 2024-10-20 VITALS
TEMPERATURE: 97.3 F | WEIGHT: 248.46 LBS | HEART RATE: 85 BPM | DIASTOLIC BLOOD PRESSURE: 68 MMHG | BODY MASS INDEX: 41.4 KG/M2 | RESPIRATION RATE: 16 BRPM | HEIGHT: 65 IN | SYSTOLIC BLOOD PRESSURE: 102 MMHG | OXYGEN SATURATION: 98 %

## 2024-10-20 LAB
ALBUMIN SERPL BCP-MCNC: 2.5 G/DL (ref 3.4–5)
ALP SERPL-CCNC: 177 U/L (ref 33–110)
ALT SERPL W P-5'-P-CCNC: 22 U/L (ref 7–45)
ANION GAP SERPL CALC-SCNC: 13 MMOL/L (ref 10–20)
AST SERPL W P-5'-P-CCNC: 41 U/L (ref 9–39)
BASOPHILS # BLD AUTO: 0.04 X10*3/UL (ref 0–0.1)
BASOPHILS NFR BLD AUTO: 0.4 %
BILIRUB SERPL-MCNC: 1.7 MG/DL (ref 0–1.2)
BUN SERPL-MCNC: 41 MG/DL (ref 6–23)
CALCIUM SERPL-MCNC: 9 MG/DL (ref 8.6–10.6)
CHLORIDE SERPL-SCNC: 94 MMOL/L (ref 98–107)
CO2 SERPL-SCNC: 32 MMOL/L (ref 21–32)
CREAT SERPL-MCNC: 2.07 MG/DL (ref 0.5–1.05)
EGFRCR SERPLBLD CKD-EPI 2021: 28 ML/MIN/1.73M*2
EOSINOPHIL # BLD AUTO: 0.45 X10*3/UL (ref 0–0.7)
EOSINOPHIL NFR BLD AUTO: 5 %
ERYTHROCYTE [DISTWIDTH] IN BLOOD BY AUTOMATED COUNT: 18.4 % (ref 11.5–14.5)
GLUCOSE SERPL-MCNC: 59 MG/DL (ref 74–99)
HCT VFR BLD AUTO: 29.5 % (ref 36–46)
HGB BLD-MCNC: 9.7 G/DL (ref 12–16)
HGB RETIC QN: 31 PG (ref 28–38)
IMM GRANULOCYTES # BLD AUTO: 0.04 X10*3/UL (ref 0–0.7)
IMM GRANULOCYTES NFR BLD AUTO: 0.4 % (ref 0–0.9)
IMMATURE RETIC FRACTION: 11.4 %
INR PPP: 2.9 (ref 0.9–1.1)
LDH SERPL L TO P-CCNC: 386 U/L (ref 84–246)
LYMPHOCYTES # BLD AUTO: 1.86 X10*3/UL (ref 1.2–4.8)
LYMPHOCYTES NFR BLD AUTO: 20.6 %
MAGNESIUM SERPL-MCNC: 2.29 MG/DL (ref 1.6–2.4)
MCH RBC QN AUTO: 29.4 PG (ref 26–34)
MCHC RBC AUTO-ENTMCNC: 32.9 G/DL (ref 32–36)
MCV RBC AUTO: 89 FL (ref 80–100)
MONOCYTES # BLD AUTO: 0.85 X10*3/UL (ref 0.1–1)
MONOCYTES NFR BLD AUTO: 9.4 %
NEUTROPHILS # BLD AUTO: 5.81 X10*3/UL (ref 1.2–7.7)
NEUTROPHILS NFR BLD AUTO: 64.2 %
NRBC BLD-RTO: 0.9 /100 WBCS (ref 0–0)
PLATELET # BLD AUTO: 366 X10*3/UL (ref 150–450)
POTASSIUM SERPL-SCNC: 4.9 MMOL/L (ref 3.5–5.3)
PROT SERPL-MCNC: 6.2 G/DL (ref 6.4–8.2)
PROTHROMBIN TIME: 33 SECONDS (ref 9.8–12.8)
RBC # BLD AUTO: 3.3 X10*6/UL (ref 4–5.2)
RETICS #: 0.19 X10*6/UL (ref 0.02–0.08)
RETICS/RBC NFR AUTO: 5.7 % (ref 0.5–2)
SODIUM SERPL-SCNC: 134 MMOL/L (ref 136–145)
WBC # BLD AUTO: 9.1 X10*3/UL (ref 4.4–11.3)

## 2024-10-20 PROCEDURE — 99233 SBSQ HOSP IP/OBS HIGH 50: CPT

## 2024-10-20 PROCEDURE — 2500000002 HC RX 250 W HCPCS SELF ADMINISTERED DRUGS (ALT 637 FOR MEDICARE OP, ALT 636 FOR OP/ED)

## 2024-10-20 PROCEDURE — 2500000001 HC RX 250 WO HCPCS SELF ADMINISTERED DRUGS (ALT 637 FOR MEDICARE OP): Performed by: NURSE PRACTITIONER

## 2024-10-20 PROCEDURE — 85025 COMPLETE CBC W/AUTO DIFF WBC: CPT | Performed by: PHYSICIAN ASSISTANT

## 2024-10-20 PROCEDURE — 85045 AUTOMATED RETICULOCYTE COUNT: CPT

## 2024-10-20 PROCEDURE — 36415 COLL VENOUS BLD VENIPUNCTURE: CPT

## 2024-10-20 PROCEDURE — 2500000005 HC RX 250 GENERAL PHARMACY W/O HCPCS: Performed by: NURSE PRACTITIONER

## 2024-10-20 PROCEDURE — 82565 ASSAY OF CREATININE: CPT

## 2024-10-20 PROCEDURE — 83615 LACTATE (LD) (LDH) ENZYME: CPT

## 2024-10-20 PROCEDURE — 1170000001 HC PRIVATE ONCOLOGY ROOM DAILY

## 2024-10-20 PROCEDURE — 83735 ASSAY OF MAGNESIUM: CPT | Performed by: PHYSICIAN ASSISTANT

## 2024-10-20 PROCEDURE — 2500000001 HC RX 250 WO HCPCS SELF ADMINISTERED DRUGS (ALT 637 FOR MEDICARE OP)

## 2024-10-20 PROCEDURE — 85610 PROTHROMBIN TIME: CPT

## 2024-10-20 RX ORDER — ACETAMINOPHEN 325 MG/1
650 TABLET ORAL EVERY 6 HOURS
Status: DISCONTINUED | OUTPATIENT
Start: 2024-10-20 | End: 2024-10-21 | Stop reason: HOSPADM

## 2024-10-20 ASSESSMENT — COGNITIVE AND FUNCTIONAL STATUS - GENERAL
TOILETING: A LITTLE
WALKING IN HOSPITAL ROOM: A LOT
DRESSING REGULAR LOWER BODY CLOTHING: A LITTLE
DAILY ACTIVITIY SCORE: 20
DRESSING REGULAR LOWER BODY CLOTHING: A LITTLE
MOBILITY SCORE: 19
CLIMB 3 TO 5 STEPS WITH RAILING: A LOT
HELP NEEDED FOR BATHING: A LITTLE
WALKING IN HOSPITAL ROOM: A LOT
STANDING UP FROM CHAIR USING ARMS: A LITTLE
DAILY ACTIVITIY SCORE: 20
MOBILITY SCORE: 19
HELP NEEDED FOR BATHING: A LITTLE
DRESSING REGULAR UPPER BODY CLOTHING: A LITTLE
CLIMB 3 TO 5 STEPS WITH RAILING: A LOT
DRESSING REGULAR UPPER BODY CLOTHING: A LITTLE
STANDING UP FROM CHAIR USING ARMS: A LITTLE
TOILETING: A LITTLE

## 2024-10-20 ASSESSMENT — PAIN - FUNCTIONAL ASSESSMENT
PAIN_FUNCTIONAL_ASSESSMENT: 0-10

## 2024-10-20 ASSESSMENT — PAIN SCALES - GENERAL
PAINLEVEL_OUTOF10: 8
PAINLEVEL_OUTOF10: 9
PAINLEVEL_OUTOF10: 8
PAINLEVEL_OUTOF10: 8
PAINLEVEL_OUTOF10: 9
PAINLEVEL_OUTOF10: 8

## 2024-10-20 NOTE — CARE PLAN
Problem: Safety - Adult  Goal: Free from fall injury  Outcome: Progressing   The patient's goals for the shift include      The clinical goals for the shift include pt will remain HDS and VSS through end of shift 10/20/24 @ 1900    Problem: Skin  Goal: Participates in plan/prevention/treatment measures  Outcome: Progressing     Problem: Skin  Goal: Decreased wound size/increased tissue granulation at next dressing change  Outcome: Progressing  Goal: Participates in plan/prevention/treatment measures  Outcome: Progressing  Goal: Prevent/manage excess moisture  Outcome: Progressing  Goal: Prevent/minimize sheer/friction injuries  Outcome: Progressing     Problem: Pain  Goal: Takes deep breaths with improved pain control throughout the shift  Outcome: Progressing  Goal: Turns in bed with improved pain control throughout the shift  Outcome: Progressing

## 2024-10-20 NOTE — CARE PLAN
Pt will remain HDS and VSS by 10/20/24 at 0700        Problem: Safety - Adult  Goal: Free from fall injury  Outcome: Progressing     Problem: Skin  Goal: Decreased wound size/increased tissue granulation at next dressing change  Outcome: Progressing  Flowsheets (Taken 10/15/2024 1927 by Kamini Rhodes, RN)  Decreased wound size/increased tissue granulation at next dressing change: Promote sleep for wound healing  Goal: Participates in plan/prevention/treatment measures  Outcome: Progressing  Flowsheets (Taken 10/13/2024 0707 by Kamini Rhodes, RN)  Participates in plan/prevention/treatment measures: Elevate heels  Goal: Prevent/manage excess moisture  Outcome: Progressing  Flowsheets (Taken 10/16/2024 2123 by Kelin Rivera RN)  Prevent/manage excess moisture: Cleanse incontinence/protect with barrier cream  Goal: Prevent/minimize sheer/friction injuries  Outcome: Progressing  Flowsheets (Taken 10/19/2024 0604)  Prevent/minimize sheer/friction injuries: Use pull sheet  Goal: Promote/optimize nutrition  Outcome: Progressing  Flowsheets (Taken 10/19/2024 0604)  Promote/optimize nutrition: Offer water/supplements/favorite foods  Goal: Promote skin healing  Outcome: Progressing  Flowsheets (Taken 10/13/2024 0707 by Kamini Rhodes RN)  Promote skin healing: Assess skin/pad under line(s)/device(s)     Problem: Pain  Goal: Takes deep breaths with improved pain control throughout the shift  Outcome: Progressing  Goal: Turns in bed with improved pain control throughout the shift  Outcome: Progressing  Goal: Walks with improved pain control throughout the shift  Outcome: Progressing  Goal: Performs ADL's with improved pain control throughout shift  Outcome: Progressing  Goal: Participates in PT with improved pain control throughout the shift  Outcome: Progressing  Goal: Free from opioid side effects throughout the shift  Outcome: Progressing  Goal: Free from acute confusion related to pain meds throughout the shift  Outcome:  Progressing     Problem: Nutrition  Goal: Less than 5 days NPO/clear liquids  Outcome: Progressing  Goal: Oral intake greater than 50%  Outcome: Progressing  Goal: Oral intake greater 75%  Outcome: Progressing  Goal: Consume prescribed supplement  Outcome: Progressing  Goal: Adequate PO fluid intake  Outcome: Progressing  Goal: Nutrition support goals are met within 48 hrs  Outcome: Progressing  Goal: Nutrition support is meeting 75% of nutrient needs  Outcome: Progressing  Goal: Lab values WNL  Outcome: Progressing  Goal: Electrolytes WNL  Outcome: Progressing  Goal: Promote healing  Outcome: Progressing  Goal: Maintain stable weight  Outcome: Progressing  Goal: Reduce weight from edema/fluid  Outcome: Progressing  Goal: Gradual weight gain  Outcome: Progressing     Problem: Fall/Injury  Goal: Not fall by end of shift  Outcome: Progressing  Goal: Be free from injury by end of the shift  Outcome: Progressing  Goal: Verbalize understanding of personal risk factors for fall in the hospital  Outcome: Progressing  Goal: Verbalize understanding of risk factor reduction measures to prevent injury from fall in the home  Outcome: Progressing  Goal: Use assistive devices by end of the shift  Outcome: Progressing  Goal: Pace activities to prevent fatigue by end of the shift  Outcome: Progressing

## 2024-10-20 NOTE — PROGRESS NOTES
"Senait Narvaez is a 54 y.o. female on day 40 of admission presenting with Sickle cell disease with crisis and other complication.    Subjective   No acute events overnight. Patient denied, nausea, vomiting, fever, and cough. still has left LE pain and tenderness to plapation.         Objective     Constitutional: Well-developed female in no acute distress.  HEENT: Normocephalic, atraumatic. PERRL. EOMI. No cervical lymphadenopathy.  Respiratory: CTA bilaterally. End respiratory wheezes bilaterally  Cardiovascular: RRR. No murmurs, gallops, or rubs.Radial pulses 2+.  Abdominal: Soft, nondistended, nontender to palpation. Bowel sounds present. No hepatosplenomegaly or masses. No CVA tenderness.  Neuro: CN II-XII intact. UE and LE strength 5/5 bilaterally and sensation intact. Normal FTN testing.  MSK: left LE tender to plapation. Right LE with 2 + edema to mid shin , non tender.  Skin: Warm, dry. No rashes or wounds.  Psych: Appropriate mood and affect.      Last Recorded Vitals  Blood pressure 131/71, pulse 100, temperature 36 °C (96.8 °F), temperature source Temporal, resp. rate 17, height 1.651 m (5' 5\"), weight 113 kg (248 lb 7.3 oz), SpO2 93%.  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 Patient was subsequently intubated due to worsening lethargic and lack of respiratory " compensation from significant metabolic acidosis with concern for ACS. Vancomycin and Zosyn started for empiric coverage, patient also started on pressors for BP support. Patient was transfused 2 units pRBCs and then underwent RBCEx on 9/11. Cardiology consulted for c/f NSTEMI vs demand ischemia (ST depressions and elevated troponin), rec treat as ACS. Course further c/b severe CHARLES with peak sCr of 5.4, and acute liver injury with severely elevated liver enzymes (ALT 4119, AST >10k). Liver US only remarkable for fatty infiltration, viral hepatitis panel negative. CHARLES and liver injury improved with supportive management. Pruritic rash noted 9/14, Derm consulted and took multiple biopsies 9/15. Rash then began to worsen to involve the groin, lateral thigh, and scalp with numerous tense bullae. Discontinued heparin given concerns for potential HIT, started bivalirudin. However PF4 was negative. Patient was extubated on 9/16/2024. Started stress dose steroids given continued pressor requirements, pressors Dc'd 9/17.      Patient transferred to [facility] 9/17, PT/OT rec SNF. Coumadin bridge was started 9/18. Given persistent rash with unremarkable labs, derm re-biopsied on 9/18, findings ultimately felt to be most c/w fixed drug eruption. Dermatology contacted for bx results (9/20) rec triamcinolone cream BID and interdry cloths. Leukocytosis uptrending 9/21, repeat infectious workup negative (CXR, Abd XR, blood cultures, MRSA negative 9/21). UA with leuks. On 9/25, per nursing and patient, rash seems to be worsening. Derm re-engaged recommended to apply vasoline to all eroded/denuded areas and continue triamcinolone cream to both the black plaques as well as the red areas across the trunk and thighs. 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 Wound care following. For warfarin dosing, per pharmacy, if INR between 2-3 can  restart home warfarin 5mg daily, if INR < 2 restart bival. INR 3.4 on 9/25, warfarin stopped, repeat INR 2.8 9/26 and warfarin restarted. On 9/26, increased PO oxycodone and discontinued IV dilaudid in anticipation for discharge. Overnight on 9/29, brain attack called out of concern for change in mental status, no focal deficits noted, BAT cancelled and narcan administered x3 with improvement of mental status. Mentation improved as of 10/2, small dose oxy resumed. INR supratherapeutic 10/3 at 6.7, 5mg IV Vit K x1 given (ok per pharmacy) I/s/o ?bleeding from vagina vs wound excoriation and UA with +blood. INR  down to 1.5 (10/4). Plan for RBCEx prior to dc per Dr. Whaley, planned 10/8. 10/5 started heparin drip (per attending) given normal INR while pending apheresis line placement (previous HIT workup with negative PF4). 10/7 started bridge back to coumadin with lovenox after apheresis line. Continue therapeutic Lovenox 120mg BID with Coumadin 5mg nightly x5 days and until INR is in therapeutic range x24 hours. 10/5 ordered duplex BLE as pt c/o severe LLE pain and swelling which was negative. Left ankle x-ray (10/8) negative for acute fractures, showed soft tissue edema. CT of left ankle (10/13) with diffuse nonspecific edema without fluid collection, may represent vascular congestion, no acute osseous abnormalities, no AVN. Uric acid elevated to 7.9, started colchicine 10/15 for acute gout flare. 10/15, noted hardening of left breast and skin changes, after discussion with attending and Dr. Whaley US Bl breast and mammogram ordered. Mammogram and US negative for acute abnormalities. On 10/16, pt c/o increased numbness and decreased sensation to plantar side of left foot most likely from nerve impingement I/s/o increased LE edema with no change in ankle pain since starting colchicine. 10/16, Colchicine was discontinued and indomethacin was started BID for gout pain. Indomethacin was held 10/18 2/2 CHARLES, though pain was  improving. INR elevated to 3.6 10/18, dose held and planning for half dose on 10/19. DC to SNF pending improvement of CHARLES and INR.     Today:  -pvr normal  -fena 0.2% --> pre-renal etiology  - plan for dc tomorrow pending improvement in CKD       # Left LE pain   # left plantar toes and arch numbness   # acute gout   - 10/5 ordered duplex BLE as pt c/o severe LLE pain and swelling which was negative  - Left ankle x-ray (10/8) negative for acute fractures, showed soft tissue edema  - CT of left ankle (10/13) with diffuse nonspecific edema without fluid collection, may represent vascular congestion, no acute osseous abnormalities, no AVN  - Uric acid elevated to 7.9 (10/15)   - s/p 1.2 mg loading dose of colchicine 10/15 and 0.6mg colchicine 10/16 AM without improvement of pain, per UTD and pharmacy, best to start colchicine 24-36 hours with initial flare, since pain started earlier this week, difficult to assess if colchicine will improve symptoms   - 10/16 c/o Left plantar side of toes and arch of foot with numbness/tingling. Decreased sensation reported in AM. All other neuro assessment WNL c/f nerve impingement from increased swelling in leg earlier this week   - stopped colchicine 10/16 and started 75mg indomethacin BID for gout pain and new concerns for tarsal tunnel syndrome   - Fructosamine pending 10/16 I/s/o family history of DM and decrease sensation/ numbness on plantar side of left foot. A1C to r/o DM will not be accurate I/s/o sickle cell disease   - s/p additional 40mg IV lasix 10/15, 10/16, and additional 20mg IV lasix given 10/17 per attending physician   - 10/17, pain is improving and more localized to left lateral malleolus and bunion, will continue treatment as above and if symptoms continue to improve can be discharged 10/18 or 10/19 pending precert   - PT to re-evaluate 10/16, OT to see pt on 10/17 for precert re-submission     # CHARLES on CKD II  - Baseline ~ SCr <2, peak 5.4 this admission. 9/21  sCr 1.58 --> 1.13 (9/25)--> 1.14 (10/3)--> 0.96 (10/6) -->  1.01 (10/14) --> 0.99 (10/15) --> 1.07 (10/16) --> 1.39 (10/17)   - Recent increase in sCr most likely I/s/o IV lasix given (10/14-10/17), sCr still below BL as of 10/17   - Likely pre-renal vs ATN given persistant hypotension over admission, on CKD II, now polyuric phase  - FeNA 0.4%  - Severe hyperkalemia with previous peaked T waves on EKG, resolved  - Nephrology following while in ICU, now signed off   - s/p multiple K cocktails in MICU  - sCr elevated to 1.93 on 10/18; likely multifactorial r/t lasix 10/17 and indomethacin  - s/p 500cc LR bolus 10/18, will recheck RFP 10/18 evening   - Encourage increased PO intake, strict I&Os Q6hrs   -pvr normal  -fena 0.2% --> pre-renal etiology  - volume status starting to trend to hypervolemic. Will need to be fluid limited and trend kidney function, course fits pre-renal etiology with expected resolution in the upcoming days.   - continue to hold home lasix    # DVT/ PE/ SVC Thrombus  - Hx SVC stricture s/p SVC angioplasty  - B/l Upper Extremity and Facial Swelling d/t partial filling defect within the SVC and a thrombus of the SVC complicated by bilateral Mediport catheters. On lifelong anticoagulation, DOAC discouraged due to high risk of clotting   - Home Coumadin 5mg daily initially HELD on admission to MICU 2/2 unstable course  - Restarted coumadin 9/18 PM, will plan to bridge with bival, Ok'd by heme   - 9/20 INR 3.8, bival and home warfarin held. Pharmacy rec repeat INR 4 hours after bival was held. If the INR < 2, we can restart the bival, but if the INR 2-3, we can continue with warfarin monotherapy   - 9/20 repeat INR 3.7, pharmacy rec continuing to hold both medications  - 9/21 INR 3.2, per pharmacy - get repeat INR in AM 9/22, if INR between 2-3 can restart home warfarin 5mg daily  -INR 2.6 (9/24) after restarting Warfarin 5mg, will maintain INR goal of 2-3  - INR 3.4 (9/25). Held warfarin on 9/25.  Repeat INR 9/26. Plan to restart if <3   - INR 2.8 (9/26), restarted warfarin, recheck INR 9/27   - INR 4.0 (9/30), held warfarin, repeat INR 10/1 ordered, plan to restart if <3  - INR 6.8 (10/1), patient without any active bleeding, per attending and pharmacy, warfarin held   - INR 6.8 (10/2), patient without any active bleeding, per attending and pharmacy, warfarin held   - INR 6.7 (10/3), cont to hold warfarin. Ordered 5mg IV Vit K x1 (ok per pharmacy) d/t ?bleeding, vaginal vs wound excoriation and UA +blood  - 10/4 INR normalized to 1.6, continue to hold warfarin  - 10/5 pt c/o severe LLE pain and swelling; duplex BLE negative  - 10/5 INR 1.2; started heparin drip given normal INR while pending apheresis line placement (previous HIT workup with negative PF4)--> held 10/7 at 0400 for apheresis line placement  - 10/7 INR 1.2--> started bridge back to coumadin tonight with lovenox. Started after apheresis line placement will start coumadin 5mg and start therapeutic lovenox 120mg BID x5 days AND until INR therepeutic x24 hours (regimen discussed with pharmacy)  - 10/10 INR 1.6   - Home coumadin 5mg daily dose resumed 10/12  - 10/13 INR 1.9   - INR therapeutic 10/14--10/17  - INR 10/18 3.6, dose held;  10/19 dose autoheld  - Follows with Coumadin clinic outpatient  - of note, SNF can check INR as little or as often as needed.      # Left breast skin changes - improved   - Noted hardening and skin changes to left breast on 10/15 around site of previous infection. Non-erythematous, no purulence (see below)   - After discussion with Dr. Whaley on 10/15, he recommended obtaining US   - Breast US limited left and Bl mammogram complete 10/16; Marked left breast diffuse edema, suggestive of vaso-occlusive etiology. Clinical correlation and management till resolution is  recommended. No mammographic or targeted sonographic evidence of malignancy.     # Waxing and waning AMS---Resolved   - Felt to be due to opioids i/s/o  worsening renal function and c/b respiratory acidosis  - All opioids on hold (9/28-10/2)  - 10/2 resumed oxycodone 10mg q3 hours as AMS now resolved   - continue to hold extended release oxycontin   - Keep supplemental O2 on board, but titrate to maintain SPO2 of 90-92%   - On CPAP at home, enforce nightly use to extent possible; may need BiPAP if unresolved     # Purpuric rash, suspected fixed drug eruption  # Concern for possible heparin-induced thrombocytopenia (HIT) -resolved  - Purple, reticular pattern of plaques noted around the bilateral breasts, scalp and groin,  with tense bullae. Non-erythematous, no purulence  - Some initial concern for possible heparin-induced thrombocytopenia with concurrent skin findings. Discontinued heparin 9/16 and transitioned to bivalirudin  - 4 T score 0, PF4 w/ reflex BINA negative. Hematology ultimately consulted, felt there was low concern for HIT and signed off  - Differential diagnosis includes purpura secondary to infection versus coagulopathy versus vasculopathy versus small and/or medium vessel vasculitis versus calciphylaxis vs drug rash  - Dermatology consulted, work-up to date:  - Low Protein C activity  - ANCA, PR3 and MPO negative  - ISABELLE neg  - cryoglobulin labs- not enough specimen to analyze  - S/p skin punch biopsies x 2 9/15 and one on 9/18, showed SUPERFICIAL EPIDERMAL DEGENERATION WITH ERYTHROCYTE EXTRAVASATION WITH NEUTROPHILS. These findings could be seen secondary to an older trauma, or resolving erythema multiforme, a fixed drug eruption, Edgar-Christopher syndrome, or toxic epidermal necrolysis. Favor Multifocal Fixed Drug Eruption    - Dermatology contacted (9/20) about results, recommended adding vancomycin to allergy list and starting triamcinolone  - On 9/25, concern from pt and nursing that rash is not improving. Under breasts and groin still causing pain. Bilateral upper thighs and forehead rash improving   - Wound care re-consulted 9/25   - Re-engaged  dermatology on 9/25 given worsening rash; recommended to apply vasoline to all eroded/denuded areas and continue triamcinolone cream to both the black plaques as well as the red areas across the trunk and thighs. 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     # Leukocytosis-- resolved   - Likely I/s/o rash vs reactive vs infectious process  - Leukocytosis noted on admission to MICU (WBC 24.8) --> 12.7 (9/25) --> 8.3 (10/3) --> as of 10/14, leukocytosis continues to be resolved   - Blood cultures 9/10, 9/12, 9/15 all NGTD  - Strep and Legionella Ag, MRSA nares negative  - CXR (9/21) largely unremarkable, again re-demonstrated perihilar prominence seen on prior XR  - Abdominal xray (9/21) unremarkable  - Repeat blood cultures x 2 (9/21) NGTD  - UA (9/21) with 500 LE  - UA (9/30) +bacteria, +leuks, urine culture (9/29) mult organisms  - UA sent 10/3 with +blood, leuks, and WBC- not reflex to cx so UCX added on to 10/3 UA and again showed multiple organisms  - Will hold off tx for UTI as pt asymptomatic and leukocytosis improved. 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  # ACS  - Previously managed through routine RBC exchanges q6 weeks, most recent RBCEx 3/1/24, per patient pausing on transfusions for time being  - OARRS reviewed, no aberrant behavior noted  - LDH>12,000 (now downtrending), haptoglobin <30, fibrinogen 318, retic % 5.2 on presentation, bili 10.7  - Leukocytosis 24.8 on admission to MICU  - CXR (9/10) persistent atelectasis/scarring in the left lower lung  - CT CAP (9/11) Diffuse mosaic attenuation of the lung parenchyma, which can be seen in the setting of small airway disease, pulmonary edema or acute infection  - Intubated upon arrival to MICU, now s/p extubation 9/16  - s/p Vanc and Zosyn (finished both courses prior to floor transfer)  - Procal elevated 7.09 (9/11)  - Blood  cultures 9/10, 9/12, 9/15 all NGTD  - Strep and Legionella Ag, MRSA nares negative  - Trop peak 1431, cards rec treat as ACS (see below)  - S/p 2u pRBCs 9/10 on arrival to MICU  - S/p exchange transfusion 9/11 AM for ACS  - S/p routine exchange transfusion 10/8 per Dr. Whaley   - Care plan from 12/6/23- For mild to moderate pain: Dilaudid 0.5mg IVP q3h as needed, for moderate to severe pain Dilaudid 1- 1.5mg q3h PRN  - On arrival to [facility], started on 0.6mg dilaudid IVP q3h PRN severe pain   - s/p 0.6mg dilaudid IVP q4h PRN breakthrough and 15mg PO oxycodone Q4 hrs severe pain (9/25)  - Per discussion with Dr. Whaley (9/25), okay to increase PO oxycodone to 15-20mg Q4hr to optimize pain for discharge to SNF.   - On 9/26, discontinued IV dilaudid and increased to 20mg PO oxycodone q4hrs for severe pain   - Opioids on hold as of 9/28 d/t AMS- resumed 2.5mg oxy q4hrs PRN severe pain on 10/2. ER oxy 10mg BID still held  - (10/5- current) increased pain reg as AMS resolved--> continue oxycodone 10mg q3hrs PRN severe pain  - Continue to hold ER Oxycontin indefinitely as was major contributor to AMS   - Bowel regimen for opioid induced constipation with Senna 2tabs BID, Miralax BID, and dulcolax suppository 10mg PRN constipation  - Zofran for opioid induced nausea, no benadryl ordered (assumed from previous AMS but also no reports of opioid-induced pruritus)  - Continue home Folic Acid 1mg daily, continue thiamine 100mg daily  - Removed femoral line 10/15      # ST depression with tropinemia, suspected 2/2 demand ischemia iso ACS  # Hx SVT  - Elevated troponins on admission, peak 1431 and now downtrending   - EKG with ST depressions, likely Type II MI due to vaso-occlusive crisis and anemia  - TTE 9/11: EF 60-65%, RV enlarged and reduced in function which appears stable when compared to last TTE (1/2024)  - Troponin leak is likely due to cardiac demand vs supply mismatch in the setting of worsening anemia and vaso-occlusive  crisis  - Cardiology consulted, now signed off with indication to treat troponemia as ACS  - Lasix held in MICU 2/2 hypotension and CHARLES, can resume as able   - Metop 12.5mg BID held in MICU 2/2 hypotension- BP and HR continue to remain stable, holding off on restarting for now  - s/p 1x dose of po lasix 20mg on 10/9 and s/p IVP 20mg lasix on 10/10 for volume overload, plan to resume home dose lasix once LE edema back to patient's baseline   - home dose of lasix of 20mg po every other day resumed on 10/12  - 10/14 s/p 1x dose of IVP lasix for increased LE edema   - 10/15 and 10/16 s/p 1x dose of 40mg IVP lasix for increased LE edema   - 10/17 s/p 20mg IV lasix per attending physician Dr. Hensley with evening RFP to assess sCr   - Follow-up with Cardiology outpt, FUV 2/13/25     # Acute liver injury, improving  # Direct hyperbilirubinuria, improving  - Suspected 2/2 hemodynamic instability versus sickle cell crisis  - On presentation to  MICU ALT 2611, AST 4727, T bili 10.2, INR 3.0; continues to improve  - CT with hepatic venous congestion, patient lacks gallbladder  - Liver US with Doppler: Diffuse fatty infiltration, unremarkable doppler interrogation of the liver  - EBV IgG positive, IgM negative  - CMV IgG positive, PCR negative  - Acute hepatitis panel unremarkable  - Hepatology initially following, now signed off  - Will continue to monitor, trend daily CMP     # pHTN   - Initial c/f CTEPH as imaging findings with dilated PA, filling defects, and mosaicism  - VQ scan (6/13/23) with non anatomical basal defects and RUL defect due to scar--> not favoring CTEPH per Dr. Yi and Dr. 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 (PFT to reevaluate for obstruction and small airway disease),  and sleep apnea    - c/w home 2 L via CPAP at night   - 10/12: Restarted home Lasix 20 mg every other day  - Follows with pulmonology outpt     # CAN  - cont home CPAP   - cont home Albuterol PRN     # H/O Cauda Equine syndrome  - Follows Dr. Delacruz as outpatient  - no current issues      # DISPO  - Full code- confirmed on admission  - NOK: Tati Salgado (mother) (#[phone]); updated @ bedside and discussed dc plan on 10/17   - PT/OT rec SNF, plan for rehab at [facility]  - DC to SNF pending pre-cert re-approval; most likely 10/18 or 10/19   - Derm FUV 11/7, Sickle Cell FUV 11/18, Cardiology FUV 2/13         I spent >60 minutes in the professional and overall care of this patient.      Ruddy Cope MD

## 2024-10-20 NOTE — ASSESSMENT & PLAN NOTE
Senait Narvaez is a 54 year-old female with a PMHx of Hgb SC disease (previously on exchange transfusions q4-6 weeks, last transfusion 3/1/24), hx of SVC syndrome, recurrent DVT/PE (on lifelong Coumadin), pHTN (6/2023), cauda equina with saddle numbness, hx SVT, fibromyalgia, Raynaud's disease, CKD II (baseline SCr~<2) and CAN who presented to [facility] ED for diffuse pain and lethargy, then transferred to  MICU for hemodynamic instability. Patient was subsequently intubated due to worsening lethargic and lack of respiratory compensation from significant metabolic acidosis with concern for ACS. Vancomycin and Zosyn started for empiric coverage, patient also started on pressors for BP support. Patient was transfused 2 units pRBCs and then underwent RBCEx on 9/11. Cardiology consulted for c/f NSTEMI vs demand ischemia (ST depressions and elevated troponin), rec treat as ACS. Course further c/b severe CHARLES with peak sCr of 5.4, and acute liver injury with severely elevated liver enzymes (ALT 4119, AST >10k). Liver US only remarkable for fatty infiltration, viral hepatitis panel negative. CHARLES and liver injury improved with supportive management. Pruritic rash noted 9/14, Derm consulted and took multiple biopsies 9/15. Rash then began to worsen to involve the groin, lateral thigh, and scalp with numerous tense bullae. Discontinued heparin given concerns for potential HIT, started bivalirudin. However PF4 was negative. Patient was extubated on 9/16/2024. Started stress dose steroids given continued pressor requirements, pressors Dc'd 9/17.      Patient transferred to [facility] 9/17, PT/OT rec SNF. Coumadin bridge was started 9/18. Given persistent rash with unremarkable labs, derm re-biopsied on 9/18, findings ultimately felt to be most c/w fixed drug eruption. Dermatology contacted for bx results (9/20) rec triamcinolone cream BID and interdry cloths. Leukocytosis uptrending 9/21, repeat infectious workup negative (CXR, Abd XR, blood  cultures, MRSA negative 9/21). UA with leuks. On 9/25, per nursing and patient, rash seems to be worsening. Derm re-engaged recommended to apply vasoline to all eroded/denuded areas and continue triamcinolone cream to both the black plaques as well as the red areas across the trunk and thighs. 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 Wound care following. For warfarin dosing, per pharmacy, if INR between 2-3 can restart home warfarin 5mg daily, if INR < 2 restart bival. INR 3.4 on 9/25, warfarin stopped, repeat INR 2.8 9/26 and warfarin restarted. On 9/26, increased PO oxycodone and discontinued IV dilaudid in anticipation for discharge. Overnight on 9/29, brain attack called out of concern for change in mental status, no focal deficits noted, BAT cancelled and narcan administered x3 with improvement of mental status. Mentation improved as of 10/2, small dose oxy resumed. INR supratherapeutic 10/3 at 6.7, 5mg IV Vit K x1 given (ok per pharmacy) I/s/o ?bleeding from vagina vs wound excoriation and UA with +blood. INR  down to 1.5 (10/4). Plan for RBCEx prior to dc per Dr. Whaley, planned 10/8. 10/5 started heparin drip (per attending) given normal INR while pending apheresis line placement (previous HIT workup with negative PF4). 10/7 started bridge back to coumadin with lovenox after apheresis line. Continue therapeutic Lovenox 120mg BID with Coumadin 5mg nightly x5 days and until INR is in therapeutic range x24 hours. 10/5 ordered duplex BLE as pt c/o severe LLE pain and swelling which was negative. Left ankle x-ray (10/8) negative for acute fractures, showed soft tissue edema. CT of left ankle (10/13) with diffuse nonspecific edema without fluid collection, may represent vascular congestion, no acute osseous abnormalities, no AVN. Uric acid elevated to 7.9, started colchicine 10/15 for acute gout flare. 10/15, noted  hardening of left breast and skin changes, after discussion with attending and Dr. Whaley US Bl breast and mammogram ordered. Mammogram and US negative for acute abnormalities. On 10/16, pt c/o increased numbness and decreased sensation to plantar side of left foot most likely from nerve impingement I/s/o increased LE edema with no change in ankle pain since starting colchicine. 10/16, Colchicine was discontinued and indomethacin was started BID for gout pain. Indomethacin was held 10/18 2/2 CHARLES, though pain was improving. INR elevated to 3.6 10/18, dose held and planning for half dose on 10/19. DC to SNF pending improvement of CHARLES and INR.     Today:  -pvr normal  -fena 0.2% --> pre-renal etiology  - plan for dc tomorrow pending improvement in CKD       # Left LE pain   # left plantar toes and arch numbness   # acute gout   - 10/5 ordered duplex BLE as pt c/o severe LLE pain and swelling which was negative  - Left ankle x-ray (10/8) negative for acute fractures, showed soft tissue edema  - CT of left ankle (10/13) with diffuse nonspecific edema without fluid collection, may represent vascular congestion, no acute osseous abnormalities, no AVN  - Uric acid elevated to 7.9 (10/15)   - s/p 1.2 mg loading dose of colchicine 10/15 and 0.6mg colchicine 10/16 AM without improvement of pain, per UTD and pharmacy, best to start colchicine 24-36 hours with initial flare, since pain started earlier this week, difficult to assess if colchicine will improve symptoms   - 10/16 c/o Left plantar side of toes and arch of foot with numbness/tingling. Decreased sensation reported in AM. All other neuro assessment WNL c/f nerve impingement from increased swelling in leg earlier this week   - stopped colchicine 10/16 and started 75mg indomethacin BID for gout pain and new concerns for tarsal tunnel syndrome   - Fructosamine pending 10/16 I/s/o family history of DM and decrease sensation/ numbness on plantar side of left foot. A1C to r/o  DM will not be accurate I/s/o sickle cell disease   - s/p additional 40mg IV lasix 10/15, 10/16, and additional 20mg IV lasix given 10/17 per attending physician   - 10/17, pain is improving and more localized to left lateral malleolus and bunion, will continue treatment as above and if symptoms continue to improve can be discharged 10/18 or 10/19 pending precert   - PT to re-evaluate 10/16, OT to see pt on 10/17 for precert re-submission     # CHARLES on CKD II  - Baseline ~ SCr <2, peak 5.4 this admission. 9/21 sCr 1.58 --> 1.13 (9/25)--> 1.14 (10/3)--> 0.96 (10/6) -->  1.01 (10/14) --> 0.99 (10/15) --> 1.07 (10/16) --> 1.39 (10/17)   - Recent increase in sCr most likely I/s/o IV lasix given (10/14-10/17), sCr still below BL as of 10/17   - Likely pre-renal vs ATN given persistant hypotension over admission, on CKD II, now polyuric phase  - FeNA 0.4%  - Severe hyperkalemia with previous peaked T waves on EKG, resolved  - Nephrology following while in ICU, now signed off   - s/p multiple K cocktails in MICU  - sCr elevated to 1.93 on 10/18; likely multifactorial r/t lasix 10/17 and indomethacin  - s/p 500cc LR bolus 10/18, will recheck RFP 10/18 evening   - Encourage increased PO intake, strict I&Os Q6hrs   -pvr normal  -fena 0.2% --> pre-renal etiology  - volume status starting to trend to hypervolemic. Will need to be fluid limited and trend kidney function, course fits pre-renal etiology with expected resolution in the upcoming days.   - continue to hold home lasix    # DVT/ PE/ SVC Thrombus  - Hx SVC stricture s/p SVC angioplasty  - B/l Upper Extremity and Facial Swelling d/t partial filling defect within the SVC and a thrombus of the SVC complicated by bilateral Mediport catheters. On lifelong anticoagulation, DOAC discouraged due to high risk of clotting   - Home Coumadin 5mg daily initially HELD on admission to MICU 2/2 unstable course  - Restarted coumadin 9/18 PM, will plan to bridge with Roberto damon'd by heme    - 9/20 INR 3.8, bival and home warfarin held. Pharmacy rec repeat INR 4 hours after bival was held. If the INR < 2, we can restart the bival, but if the INR 2-3, we can continue with warfarin monotherapy   - 9/20 repeat INR 3.7, pharmacy rec continuing to hold both medications  - 9/21 INR 3.2, per pharmacy - get repeat INR in AM 9/22, if INR between 2-3 can restart home warfarin 5mg daily  -INR 2.6 (9/24) after restarting Warfarin 5mg, will maintain INR goal of 2-3  - INR 3.4 (9/25). Held warfarin on 9/25. Repeat INR 9/26. Plan to restart if <3   - INR 2.8 (9/26), restarted warfarin, recheck INR 9/27   - INR 4.0 (9/30), held warfarin, repeat INR 10/1 ordered, plan to restart if <3  - INR 6.8 (10/1), patient without any active bleeding, per attending and pharmacy, warfarin held   - INR 6.8 (10/2), patient without any active bleeding, per attending and pharmacy, warfarin held   - INR 6.7 (10/3), cont to hold warfarin. Ordered 5mg IV Vit K x1 (ok per pharmacy) d/t ?bleeding, vaginal vs wound excoriation and UA +blood  - 10/4 INR normalized to 1.6, continue to hold warfarin  - 10/5 pt c/o severe LLE pain and swelling; duplex BLE negative  - 10/5 INR 1.2; started heparin drip given normal INR while pending apheresis line placement (previous HIT workup with negative PF4)--> held 10/7 at 0400 for apheresis line placement  - 10/7 INR 1.2--> started bridge back to coumadin tonight with lovenox. Started after apheresis line placement will start coumadin 5mg and start therapeutic lovenox 120mg BID x5 days AND until INR therepeutic x24 hours (regimen discussed with pharmacy)  - 10/10 INR 1.6   - Home coumadin 5mg daily dose resumed 10/12  - 10/13 INR 1.9   - INR therapeutic 10/14--10/17  - INR 10/18 3.6, dose held;  10/19 dose autoheld  - Follows with Coumadin clinic outpatient  - of note, SNF can check INR as little or as often as needed.      # Left breast skin changes - improved   - Noted hardening and skin changes to left  breast on 10/15 around site of previous infection. Non-erythematous, no purulence (see below)   - After discussion with Dr. Whaley on 10/15, he recommended obtaining US   - Breast US limited left and Bl mammogram complete 10/16; Marked left breast diffuse edema, suggestive of vaso-occlusive etiology. Clinical correlation and management till resolution is  recommended. No mammographic or targeted sonographic evidence of malignancy.     # Waxing and waning AMS---Resolved   - Felt to be due to opioids i/s/o worsening renal function and c/b respiratory acidosis  - All opioids on hold (9/28-10/2)  - 10/2 resumed oxycodone 10mg q3 hours as AMS now resolved   - continue to hold extended release oxycontin   - Keep supplemental O2 on board, but titrate to maintain SPO2 of 90-92%   - On CPAP at home, enforce nightly use to extent possible; may need BiPAP if unresolved     # Purpuric rash, suspected fixed drug eruption  # Concern for possible heparin-induced thrombocytopenia (HIT) -resolved  - Purple, reticular pattern of plaques noted around the bilateral breasts, scalp and groin,  with tense bullae. Non-erythematous, no purulence  - Some initial concern for possible heparin-induced thrombocytopenia with concurrent skin findings. Discontinued heparin 9/16 and transitioned to bivalirudin  - 4 T score 0, PF4 w/ reflex BINA negative. Hematology ultimately consulted, felt there was low concern for HIT and signed off  - Differential diagnosis includes purpura secondary to infection versus coagulopathy versus vasculopathy versus small and/or medium vessel vasculitis versus calciphylaxis vs drug rash  - Dermatology consulted, work-up to date:  - Low Protein C activity  - ANCA, PR3 and MPO negative  - ISABELLE neg  - cryoglobulin labs- not enough specimen to analyze  - S/p skin punch biopsies x 2 9/15 and one on 9/18, showed SUPERFICIAL EPIDERMAL DEGENERATION WITH ERYTHROCYTE EXTRAVASATION WITH NEUTROPHILS. These findings could be seen  secondary to an older trauma, or resolving erythema multiforme, a fixed drug eruption, Edgar-Christopher syndrome, or toxic epidermal necrolysis. Favor Multifocal Fixed Drug Eruption    - Dermatology contacted (9/20) about results, recommended adding vancomycin to allergy list and starting triamcinolone  - On 9/25, concern from pt and nursing that rash is not improving. Under breasts and groin still causing pain. Bilateral upper thighs and forehead rash improving   - Wound care re-consulted 9/25   - Re-engaged dermatology on 9/25 given worsening rash; recommended to apply vasoline to all eroded/denuded areas and continue triamcinolone cream to both the black plaques as well as the red areas across the trunk and thighs. 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     # Leukocytosis-- resolved   - Likely I/s/o rash vs reactive vs infectious process  - Leukocytosis noted on admission to MICU (WBC 24.8) --> 12.7 (9/25) --> 8.3 (10/3) --> as of 10/14, leukocytosis continues to be resolved   - Blood cultures 9/10, 9/12, 9/15 all NGTD  - Strep and Legionella Ag, MRSA nares negative  - CXR (9/21) largely unremarkable, again re-demonstrated perihilar prominence seen on prior XR  - Abdominal xray (9/21) unremarkable  - Repeat blood cultures x 2 (9/21) NGTD  - UA (9/21) with 500 LE  - UA (9/30) +bacteria, +leuks, urine culture (9/29) mult organisms  - UA sent 10/3 with +blood, leuks, and WBC- not reflex to cx so UCX added on to 10/3 UA and again showed multiple organisms  - Will hold off tx for UTI as pt asymptomatic and leukocytosis improved. 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  # ACS  - Previously managed through routine RBC exchanges q6 weeks, most recent RBCEx 3/1/24, per patient pausing on transfusions for time being  - OARRS reviewed, no aberrant behavior noted  - LDH>12,000 (now downtrending),  haptoglobin <30, fibrinogen 318, retic % 5.2 on presentation, bili 10.7  - Leukocytosis 24.8 on admission to MICU  - CXR (9/10) persistent atelectasis/scarring in the left lower lung  - CT CAP (9/11) Diffuse mosaic attenuation of the lung parenchyma, which can be seen in the setting of small airway disease, pulmonary edema or acute infection  - Intubated upon arrival to MICU, now s/p extubation 9/16  - s/p Vanc and Zosyn (finished both courses prior to floor transfer)  - Procal elevated 7.09 (9/11)  - Blood cultures 9/10, 9/12, 9/15 all NGTD  - Strep and Legionella Ag, MRSA nares negative  - Trop peak 1431, cards rec treat as ACS (see below)  - [facility]  - S/p exchange transfusion 9/11 AM for ACS  - S/p routine exchange transfusion 10/8 per Dr. Whaley   - Care plan from 12/6/23- For mild to moderate pain: Dilaudid 0.5mg IVP q3h as needed, for moderate to severe pain Dilaudid 1- 1.5mg q3h PRN  - On arrival to [facility], started on 0.6mg dilaudid IVP q3h PRN severe pain   - s/p 0.6mg dilaudid IVP q4h PRN breakthrough and 15mg PO oxycodone Q4 hrs severe pain (9/25)  - Per discussion with Dr. Whaley (9/25), okay to increase PO oxycodone to 15-20mg Q4hr to optimize pain for discharge to SNF.   - On 9/26, discontinued IV dilaudid and increased to 20mg PO oxycodone q4hrs for severe pain   - Opioids on hold as of 9/28 d/t AMS- resumed 2.5mg oxy q4hrs PRN severe pain on 10/2. ER oxy 10mg BID still held  - (10/5- current) increased pain reg as AMS resolved--> continue oxycodone 10mg q3hrs PRN severe pain  - Continue to hold ER Oxycontin indefinitely as was major contributor to AMS   - Bowel regimen for opioid induced constipation with Senna 2tabs BID, Miralax BID, and dulcolax suppository 10mg PRN constipation  - Zofran for opioid induced nausea, no benadryl ordered (assumed from previous AMS but also no reports of opioid-induced pruritus)  - Continue home Folic Acid 1mg daily, continue thiamine 100mg  daily  - Removed femoral line 10/15      # ST depression with tropinemia, suspected 2/2 demand ischemia iso ACS  # Hx SVT  - Elevated troponins on admission, peak 1431 and now downtrending   - EKG with ST depressions, likely Type II MI due to vaso-occlusive crisis and anemia  - TTE 9/11: EF 60-65%, RV enlarged and reduced in function which appears stable when compared to last TTE (1/2024)  - Troponin leak is likely due to cardiac demand vs supply mismatch in the setting of worsening anemia and vaso-occlusive crisis  - Cardiology consulted, now signed off with indication to treat troponemia as ACS  - Lasix held in MICU 2/2 hypotension and CHARLES, can resume as able   - Metop 12.5mg BID held in MICU 2/2 hypotension- BP and HR continue to remain stable, holding off on restarting for now  - s/p 1x dose of po lasix 20mg on 10/9 and s/p IVP 20mg lasix on 10/10 for volume overload, plan to resume home dose lasix once LE edema back to patient's baseline   - home dose of lasix of 20mg po every other day resumed on 10/12  - 10/14 s/p 1x dose of IVP lasix for increased LE edema   - 10/15 and 10/16 s/p 1x dose of 40mg IVP lasix for increased LE edema   - 10/17 s/p 20mg IV lasix per attending physician Dr. Hensley with evening RFP to assess sCr   - Follow-up with Cardiology outpt, FUV 2/13/25     # Acute liver injury, improving  # Direct hyperbilirubinuria, improving  - Suspected 2/2 hemodynamic instability versus sickle cell crisis  - On presentation to  MICU ALT 2611, AST 4727, T bili 10.2, INR 3.0; continues to improve  - CT with hepatic venous congestion, patient lacks gallbladder  - Liver US with Doppler: Diffuse fatty infiltration, unremarkable doppler interrogation of the liver  - EBV IgG positive, IgM negative  - CMV IgG positive, PCR negative  - Acute hepatitis panel unremarkable  - Hepatology initially following, now signed off  - Will continue to monitor, trend daily CMP     # pHTN   - Initial c/f CTEPH as imaging  findings with dilated PA, filling defects, and mosaicism  - VQ scan (6/13/23) with non anatomical basal defects and RUL defect due to scar--> not favoring CTEPH per Dr. Yi and Dr. 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 (PFT to reevaluate for obstruction and small airway disease), and sleep apnea    - c/w home 2 L via CPAP at night   - 10/12: Restarted home Lasix 20 mg every other day  - Follows with pulmonology outpt     # CAN  - cont home CPAP   - cont home Albuterol PRN     # H/O Cauda Equine syndrome  - Follows Dr. Delacruz as outpatient  - no current issues      # DISPO  - Full code- confirmed on admission  - NOK: Tati Salgado (mother) (#[phone]); updated @ bedside and discussed dc plan on 10/17   - PT/OT rec SNF, plan for rehab at [facility]  - DC to SNF pending pre-cert re-approval; most likely 10/18 or 10/19   - Derm FUV 11/7, Sickle Cell FUV 11/18, Cardiology FUV 2/13

## 2024-10-21 VITALS
TEMPERATURE: 97.9 F | OXYGEN SATURATION: 99 % | WEIGHT: 248.46 LBS | HEIGHT: 65 IN | BODY MASS INDEX: 41.4 KG/M2 | DIASTOLIC BLOOD PRESSURE: 67 MMHG | HEART RATE: 89 BPM | RESPIRATION RATE: 22 BRPM | SYSTOLIC BLOOD PRESSURE: 117 MMHG

## 2024-10-21 DIAGNOSIS — D57.219 SICKLE-CELL-HEMOGLOBIN C DISEASE WITH CRISIS (MULTI): Primary | ICD-10-CM

## 2024-10-21 DIAGNOSIS — G89.4 CHRONIC PAIN SYNDROME: ICD-10-CM

## 2024-10-21 DIAGNOSIS — Z92.89 HISTORY OF EXCHANGE TRANSFUSION: ICD-10-CM

## 2024-10-21 PROBLEM — D57.09: Status: RESOLVED | Noted: 2023-10-17 | Resolved: 2024-10-21

## 2024-10-21 LAB
ALBUMIN SERPL BCP-MCNC: 2.4 G/DL (ref 3.4–5)
ALP SERPL-CCNC: 175 U/L (ref 33–110)
ALT SERPL W P-5'-P-CCNC: 13 U/L (ref 7–45)
ANION GAP SERPL CALC-SCNC: 12 MMOL/L (ref 10–20)
AST SERPL W P-5'-P-CCNC: 34 U/L (ref 9–39)
BASOPHILS # BLD AUTO: 0.03 X10*3/UL (ref 0–0.1)
BASOPHILS NFR BLD AUTO: 0.4 %
BILIRUB SERPL-MCNC: 1.3 MG/DL (ref 0–1.2)
BUN SERPL-MCNC: 37 MG/DL (ref 6–23)
CALCIUM SERPL-MCNC: 8.5 MG/DL (ref 8.6–10.6)
CHLORIDE SERPL-SCNC: 95 MMOL/L (ref 98–107)
CO2 SERPL-SCNC: 35 MMOL/L (ref 21–32)
CREAT SERPL-MCNC: 1.55 MG/DL (ref 0.5–1.05)
EGFRCR SERPLBLD CKD-EPI 2021: 39 ML/MIN/1.73M*2
EOSINOPHIL # BLD AUTO: 0.45 X10*3/UL (ref 0–0.7)
EOSINOPHIL NFR BLD AUTO: 5.7 %
ERYTHROCYTE [DISTWIDTH] IN BLOOD BY AUTOMATED COUNT: 18.6 % (ref 11.5–14.5)
GLUCOSE SERPL-MCNC: 68 MG/DL (ref 74–99)
HCT VFR BLD AUTO: 27.6 % (ref 36–46)
HGB BLD-MCNC: 9.4 G/DL (ref 12–16)
HGB RETIC QN: 30 PG (ref 28–38)
IMM GRANULOCYTES # BLD AUTO: 0.05 X10*3/UL (ref 0–0.7)
IMM GRANULOCYTES NFR BLD AUTO: 0.6 % (ref 0–0.9)
IMMATURE RETIC FRACTION: 9.5 %
INR PPP: 3.2 (ref 0.9–1.1)
LDH SERPL L TO P-CCNC: 344 U/L (ref 84–246)
LYMPHOCYTES # BLD AUTO: 1.66 X10*3/UL (ref 1.2–4.8)
LYMPHOCYTES NFR BLD AUTO: 21 %
MAGNESIUM SERPL-MCNC: 2.23 MG/DL (ref 1.6–2.4)
MCH RBC QN AUTO: 30.2 PG (ref 26–34)
MCHC RBC AUTO-ENTMCNC: 34.1 G/DL (ref 32–36)
MCV RBC AUTO: 89 FL (ref 80–100)
MONOCYTES # BLD AUTO: 0.89 X10*3/UL (ref 0.1–1)
MONOCYTES NFR BLD AUTO: 11.3 %
NEUTROPHILS # BLD AUTO: 4.81 X10*3/UL (ref 1.2–7.7)
NEUTROPHILS NFR BLD AUTO: 61 %
NRBC BLD-RTO: 1.4 /100 WBCS (ref 0–0)
PLATELET # BLD AUTO: 418 X10*3/UL (ref 150–450)
POTASSIUM SERPL-SCNC: 4.5 MMOL/L (ref 3.5–5.3)
PROT SERPL-MCNC: 5.9 G/DL (ref 6.4–8.2)
PROTHROMBIN TIME: 36.6 SECONDS (ref 9.8–12.8)
RBC # BLD AUTO: 3.11 X10*6/UL (ref 4–5.2)
RETICS #: 0.16 X10*6/UL (ref 0.02–0.08)
RETICS/RBC NFR AUTO: 5.1 % (ref 0.5–2)
SODIUM SERPL-SCNC: 137 MMOL/L (ref 136–145)
WBC # BLD AUTO: 7.9 X10*3/UL (ref 4.4–11.3)

## 2024-10-21 PROCEDURE — 2500000001 HC RX 250 WO HCPCS SELF ADMINISTERED DRUGS (ALT 637 FOR MEDICARE OP): Performed by: NURSE PRACTITIONER

## 2024-10-21 PROCEDURE — 83615 LACTATE (LD) (LDH) ENZYME: CPT

## 2024-10-21 PROCEDURE — 2500000001 HC RX 250 WO HCPCS SELF ADMINISTERED DRUGS (ALT 637 FOR MEDICARE OP)

## 2024-10-21 PROCEDURE — 2500000005 HC RX 250 GENERAL PHARMACY W/O HCPCS: Performed by: NURSE PRACTITIONER

## 2024-10-21 PROCEDURE — 99239 HOSP IP/OBS DSCHRG MGMT >30: CPT

## 2024-10-21 PROCEDURE — 83735 ASSAY OF MAGNESIUM: CPT | Performed by: PHYSICIAN ASSISTANT

## 2024-10-21 PROCEDURE — 85025 COMPLETE CBC W/AUTO DIFF WBC: CPT | Performed by: PHYSICIAN ASSISTANT

## 2024-10-21 PROCEDURE — 85045 AUTOMATED RETICULOCYTE COUNT: CPT

## 2024-10-21 PROCEDURE — 36415 COLL VENOUS BLD VENIPUNCTURE: CPT | Performed by: PHYSICIAN ASSISTANT

## 2024-10-21 PROCEDURE — 85610 PROTHROMBIN TIME: CPT

## 2024-10-21 PROCEDURE — 80053 COMPREHEN METABOLIC PANEL: CPT

## 2024-10-21 PROCEDURE — 2500000002 HC RX 250 W HCPCS SELF ADMINISTERED DRUGS (ALT 637 FOR MEDICARE OP, ALT 636 FOR OP/ED)

## 2024-10-21 RX ORDER — OXYCODONE HYDROCHLORIDE 10 MG/1
10 TABLET ORAL EVERY 4 HOURS PRN
Qty: 18 TABLET | Refills: 0 | Status: SHIPPED | OUTPATIENT
Start: 2024-10-21 | End: 2024-10-24

## 2024-10-21 ASSESSMENT — COGNITIVE AND FUNCTIONAL STATUS - GENERAL
DAILY ACTIVITIY SCORE: 18
HELP NEEDED FOR BATHING: A LITTLE
TOILETING: A LITTLE
HELP NEEDED FOR BATHING: A LITTLE
PERSONAL GROOMING: A LITTLE
CLIMB 3 TO 5 STEPS WITH RAILING: A LOT
DRESSING REGULAR UPPER BODY CLOTHING: A LITTLE
MOBILITY SCORE: 19
EATING MEALS: A LITTLE
DRESSING REGULAR UPPER BODY CLOTHING: A LITTLE
PERSONAL GROOMING: A LITTLE
DAILY ACTIVITIY SCORE: 18
STANDING UP FROM CHAIR USING ARMS: A LITTLE
WALKING IN HOSPITAL ROOM: A LOT
DRESSING REGULAR LOWER BODY CLOTHING: A LITTLE
WALKING IN HOSPITAL ROOM: A LOT
MOBILITY SCORE: 19
STANDING UP FROM CHAIR USING ARMS: A LITTLE
TOILETING: A LITTLE
CLIMB 3 TO 5 STEPS WITH RAILING: A LOT
DRESSING REGULAR LOWER BODY CLOTHING: A LITTLE
EATING MEALS: A LITTLE

## 2024-10-21 ASSESSMENT — PAIN SCALES - GENERAL
PAINLEVEL_OUTOF10: 8
PAINLEVEL_OUTOF10: 7
PAINLEVEL_OUTOF10: 8

## 2024-10-21 ASSESSMENT — PAIN - FUNCTIONAL ASSESSMENT
PAIN_FUNCTIONAL_ASSESSMENT: 0-10
PAIN_FUNCTIONAL_ASSESSMENT: 0-10

## 2024-10-21 NOTE — DISCHARGE SUMMARY
Discharge Diagnosis  Sickle cell disease with crisis and other complication    Issues Requiring Follow-Up  Sickle cell disease     Test Results Pending At Discharge  Pending Labs       Order Current Status    Extra Urine Gray Tube Collected (09/10/24 6359)    Protime-INR Collected (10/21/24 0830)    CBC and Auto Differential In process    Comprehensive metabolic panel In process    Lactate dehydrogenase In process    Magnesium In process    Reticulocytes In process    Urinalysis with Reflex Culture and Microscopic In process            Hospital Course  Senait Narvaez is a 53 year-old female with a PMHx of Hgb SC disease (previously on exchange transfusions q4-6 weeks, last transfusion 3/1/24), hx of SVC syndrome, 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     Per patient's daughter, patient began feeling diffuse body pain lasting for about 1 week. The symptoms were consistent with previous sickle cell pain crises. Patient had received exchange transfusions in the past but were stopped in March 2024 after patient had worsening lethargy post transfusions. Patient then began to have worsening lethargy and aching chest pain on 9/10/2024. She was taken to [facility] ED and subsequently transferred to  MICU for further evaluation.    In the MICU, patient was hemodynamically unstable and given boluses of fluids while started on levo and vasopressin. Patient was subsequently intubated due to worsening lethargic and lack of respiratory compensation from significant metabolic acidosis. Vancomycin and Zosyn started. Patient was then transfused 2 units pRBCs.    Patient underwent exchange transfusion on 9/11. Cardiology consulted given concerns for NSTEMI versus demand ischemia with ST depressions on EKG and elevated troponins. TTE showing EF 60- 65% with  RV enlargement and reduced function that appeared stable when compared to previous TTE. Cardiology recommended no further indication to treat as ACS. Patient had severe CHARLES with peak creatinine around 5.4. No hemodialysis was required per nephrology. Finally hepatology consulted given worsening LFTs in the setting of severe acute liver injury. Liver ultrasound with Doppler only remarkable for diffuse fatty infiltration. Remainder of viral hepatitis workup returned negative. Both CHARLES and acute liver injury improved with monitoring and supportive management.    Patient began to have pruritic rash first noticed on the bilateral breasts on 9/14. Dermatology consulted, skin biopsies taken on 9/15. Rash then began to worsen to involve the groin, lateral thigh, and scalp with numerous tense bullae. Discontinued heparin given concerns for potential HIT, started bivalirudin. Patient was extubated on 9/16/2024. Started stress dose steroids given continued pressor requirements. Pressors were then weaned and discontinued on 9/17. Stress dose steroids discontinued as well. Patient stable and appropriate for transfer to the floor.    Patient transferred to [facility] 9/17, PT/OT rec SNF. Coumadin bridge was started 9/18. Given persistent rash with unremarkable labs, derm re-biopsied on 9/18, findings ultimately felt to be most c/w fixed drug eruption. Dermatology contacted for bx results (9/20) rec triamcinolone cream BID and interdry cloths. Leukocytosis uptrending 9/21, repeat infectious workup negative (CXR, Abd XR, blood cultures, MRSA negative 9/21). UA with leuks (9/21). On 9/25, per nursing and patient, rash seems to be worsening. Derm re-engaged recommended to apply vasoline to all eroded/denuded areas and continue triamcinolone cream to both the black plaques as well as the red areas across the trunk and thighs. Per dermatology, erythema is mild and not palpable, it is most likely part of the drug eruption the patient has  previously been known to have. Wound care following. For warfarin dosing, per pharmacy, if INR between 2-3 can restart home warfarin 5mg daily, if INR < 2 restart bival. INR 3.4 on , warfarin stopped, repeat INR 2.8  and warfarin restarted. On , increased PO oxycodone and discontinued IV dilaudid in anticipation for discharge.    10/21 patient discharged to SNF with Rx for oxycodone. Patient to continue Warfarin 5mg daily. Patient to follow up with dermatology 24 and Dr. Whaley 24.   On the day of discharge, the patient reported feeling well and pain was controlled. Vitals and labs were stable. On exam:  Constitutional: Awake, NAD  ENMT: mucous membranes moist, no apparent injury, no lesions seen  Head/Neck: NCAT  Respiratory/Thorax: CTAB, no wheezing  Cardiovascular: RRR, S1+S2, no murmur  Gastrointestinal: Soft, NT, nondistended, +BS  Extremities: No LE edema  Psychological: Appropriate mood and behavior  Skin: no rash noted  Attending has reviewed all labs and vitals, and discussed and agreed with the discharge plan prior to patient discharge.  Patient discharged in stable condition. > 30 minutes spent on discharge planning      Pertinent Physical Exam At Time of Discharge  Physical Exam  HENT:      Head: Normocephalic.      Nose: Nose normal.      Mouth/Throat:      Mouth: Mucous membranes are moist.   Eyes:      Pupils: Pupils are equal, round, and reactive to light.   Cardiovascular:      Rate and Rhythm: Normal rate.   Pulmonary:      Effort: Pulmonary effort is normal.   Abdominal:      Palpations: Abdomen is soft.   Musculoskeletal:         General: Normal range of motion.      Cervical back: Normal range of motion.      Right lower le+ Edema present.      Left lower le+ Edema present.   Skin:     General: Skin is dry.      Findings: Rash present.   Neurological:      General: No focal deficit present.      Mental Status: She is alert.   Psychiatric:         Mood and Affect:  Mood normal.         Home Medications     Medication List      START taking these medications     colchicine 0.6 mg tablet; Take 1 tablet (0.6 mg) by mouth 2 times a day.   Continue to take for acute gout flare. Stop 48 hours after gout flare Do   not fill before October 16, 2024.   oxygen gas therapy; Commonly known as: O2; Inhale 1 each once every 24   hours.   oxygen gas therapy; Commonly known as: O2; Inhale 1 each continuously.   triamcinolone 0.1 % cream; Commonly known as: Kenalog; Apply topically 2   times a day. Apply triamcinolone 0.1% cream to both the black plaques as   well as the red areas across the trunk and thighs   white petrolatum 41 % ointment ointment; Commonly known as: Aquaphor;   Apply 2 Applications topically 2 times a day. Apply vaseline to all   eroded/denuded areas. Mepilex transfer sheets may be used for areas of   friction     CONTINUE taking these medications     ascorbic acid 500 mg chewable tablet; Commonly known as: Vitamin C   benzonatate 100 mg capsule; Commonly known as: Tessalon   cyclobenzaprine 10 mg tablet; Commonly known as: Flexeril; Take 1 tablet   (10 mg) by mouth 3 times a day as needed for muscle spasms.   fluticasone 50 mcg/actuation nasal spray; Commonly known as: Flonase   folic acid 1 mg tablet; Commonly known as: Folvite; Take 1 tablet (1 mg)   by mouth once daily.   furosemide 20 mg tablet; Commonly known as: Lasix; Take 1 tablet (20 mg)   by mouth every other day.   naloxone 4 mg/0.1 mL nasal spray; Commonly known as: Narcan; Administer   1 spray (4 mg) into affected nostril(s) if needed for opioid reversal. May   repeat every 2-3 minutes if needed, alternating nostrils, until medical   assistance becomes available.   ondansetron 4 mg tablet; Commonly known as: Zofran; Take 1 tablet (4 mg)   by mouth every 8 hours if needed for nausea or vomiting.   oxyCODONE 10 mg immediate release tablet; Commonly known as: Roxicodone;   Take 1 tablet (10 mg) by mouth every 4  hours if needed for severe pain (7   - 10) (pain) for up to 3 days.   oxygen gas therapy; Commonly known as: O2; Inhale 1 each continuously.   sennosides-docusate sodium 8.6-50 mg tablet; Commonly known as:   Nita-Colace   warfarin 5 mg tablet; Commonly known as: Coumadin; Take as directed. If   you are unsure how to take this medication, talk to your nurse or doctor.;   Original instructions: Take 1 tablet (5 mg) by mouth once daily in the   evening.     STOP taking these medications     metoprolol tartrate 25 mg tablet; Commonly known as: Lopressor     ASK your doctor about these medications     albuterol 90 mcg/actuation inhaler; Inhale 2 puffs every 6 hours if   needed for wheezing.       Outpatient Follow-Up  Future Appointments   Date Time Provider Department Center   11/7/2024 11:00 AM Xiomara Ferguson MD [id number]   11/18/2024  2:00 PM Erich Whaley MD [id number] [facility]   2/13/2025  8:40 AM Ankur White DO [id number] [facility]       Rach Saleh, GRISEL-CNP

## 2024-10-21 NOTE — SIGNIFICANT EVENT
Rapid Response Nurse Note: RADAR alert: 6    Pager time:   Arrival time:   Event end time:   Location: [facility]  [x] Triage by phone or secure messaging    Rapid response initiated by:  [] Rapid response RN [] Family [] Nursing Supervisor [] Physician   [x] RADAR auto page [] Sepsis auto-page [] RN [] RT   [] NP/PA [] Other:     Primary reason for call:   [] BAT [] New CPAP/BiPAP [] Bleeding [] Change in mental status   [] Chest pain [] Code blue [] FiO2 >/= 50% [] HR </= 40 bpm   [] HR >/= 130 bpm [] Hyperglycemia [] Hypoglycemia [] RADAR    [] RR </= 8 bpm [] RR >/= 30 bpm [] SBP </= 90 mmHg [] SpO2 < 90%   [] Seizure [] Sepsis [] Shorness of breath  [] Staff concern: see comments     Initial VS and/or RADAR VS: T 36.5 °C; HR 88; RR 18; BP 96/54; SPO2 93%.  Providers present at bedside (if applicable):     Interventions:  [x] None [] ABG/VBG [] Assist w/ICU transfer [] BAT paged    [] Bag mask [] Blood [] Cardioversion [] Code Blue   [] Code blue for intubation [] Code status changed [] Chest x-ray [] EKG   [] IV fluid/bolus [] KUB x-ray [] Labs/cultures [] Medication   [] Nebulizer treatment [] NIPPV (CPAP/BiPAP) [] Oxygen [] Oral airway   [] Peripheral IV [] Palliative care consult [] CT/MRI [] Sepsis protocol    [] Suctioned [] Other:     Name of ICU Provider contacted (if applicable):   Outcome:  [] Coded and  [] Code blue for intubation [] Coded and transferred to ICU []  on division   [x] Remained on division (no change) [] Remained on division + additional monitoring [] Remained in ED [] Transferred to ED   [] Transferred to ICU [] Transferred to inpatient status [] Transferred for interventions (procedure) [] Transferred to ICU stepdown    [] Transferred to surgery [] Transferred to telemetry [] Sepsis protocol [] STEMI protocol   [] Stroke protocol [] Bedside nurse instructed to page rapid response for any concerns or acute change in condition/VS     Additional Comments: SONIA may  auto generated from VS -see documented VS. Message to bedside nurse and VS consistent. Pt to discharge home today- no concerns.

## 2024-10-21 NOTE — CARE PLAN
Problem: Safety - Adult  Goal: Free from fall injury  Outcome: Progressing     Problem: Skin  Goal: Decreased wound size/increased tissue granulation at next dressing change  Outcome: Progressing  Flowsheets (Taken 10/20/2024 2219)  Decreased wound size/increased tissue granulation at next dressing change: Promote sleep for wound healing  Goal: Participates in plan/prevention/treatment measures  Outcome: Progressing  Flowsheets (Taken 10/20/2024 2219)  Participates in plan/prevention/treatment measures: Elevate heels  Goal: Prevent/manage excess moisture  Outcome: Progressing  Flowsheets (Taken 10/20/2024 2219)  Prevent/manage excess moisture: Moisturize dry skin  Goal: Prevent/minimize sheer/friction injuries  Outcome: Progressing  Flowsheets (Taken 10/20/2024 2219)  Prevent/minimize sheer/friction injuries: Use pull sheet  Goal: Promote/optimize nutrition  Outcome: Progressing  Flowsheets (Taken 10/20/2024 2219)  Promote/optimize nutrition: Offer water/supplements/favorite foods  Goal: Promote skin healing  Outcome: Progressing  Flowsheets (Taken 10/20/2024 2219)  Promote skin healing: Assess skin/pad under line(s)/device(s)     Problem: Knowledge Deficit  Goal: Patient/family/caregiver demonstrates understanding of disease process, treatment plan, medications, and discharge instructions  Outcome: Progressing     Problem: Mechanical Ventilation  Goal: Patient Will Maintain Patent Airway  Outcome: Progressing  Goal: Oral health is maintained or improved  Outcome: Progressing  Goal: Tracheostomy will be managed safely  Outcome: Progressing  Goal: ET tube will be managed safely  Outcome: Progressing  Goal: Ability to express needs and understand communication  Outcome: Progressing  Goal: Mobility/activity is maintained at optimum level for patient  Outcome: Progressing     Problem: Pain  Goal: Takes deep breaths with improved pain control throughout the shift  Outcome: Progressing  Goal: Turns in bed with improved  pain control throughout the shift  Outcome: Progressing  Goal: Walks with improved pain control throughout the shift  Outcome: Progressing  Goal: Performs ADL's with improved pain control throughout shift  Outcome: Progressing  Goal: Participates in PT with improved pain control throughout the shift  Outcome: Progressing  Goal: Free from opioid side effects throughout the shift  Outcome: Progressing  Goal: Free from acute confusion related to pain meds throughout the shift  Outcome: Progressing     Problem: Nutrition  Goal: Less than 5 days NPO/clear liquids  Outcome: Progressing  Goal: Oral intake greater than 50%  Outcome: Progressing  Goal: Oral intake greater 75%  Outcome: Progressing  Goal: Consume prescribed supplement  Outcome: Progressing  Goal: Adequate PO fluid intake  Outcome: Progressing  Goal: Nutrition support goals are met within 48 hrs  Outcome: Progressing  Goal: Nutrition support is meeting 75% of nutrient needs  Outcome: Progressing  Goal: Tube feed tolerance  Outcome: Progressing  Goal: BG  mg/dL  Outcome: Progressing  Goal: Lab values WNL  Outcome: Progressing  Goal: Electrolytes WNL  Outcome: Progressing  Goal: Promote healing  Outcome: Progressing  Goal: Maintain stable weight  Outcome: Progressing  Goal: Reduce weight from edema/fluid  Outcome: Progressing  Goal: Gradual weight gain  Outcome: Progressing  Goal: Improve ostomy output  Outcome: Progressing     Problem: Fall/Injury  Goal: Not fall by end of shift  Outcome: Progressing  Goal: Be free from injury by end of the shift  Outcome: Progressing  Goal: Verbalize understanding of personal risk factors for fall in the hospital  Outcome: Progressing  Goal: Verbalize understanding of risk factor reduction measures to prevent injury from fall in the home  Outcome: Progressing  Goal: Use assistive devices by end of the shift  Outcome: Progressing  Goal: Pace activities to prevent fatigue by end of the shift  Outcome: Progressing       The  clinical goals for the shift include Pt will remain HDS and VSS throughout shift.

## 2024-10-21 NOTE — NURSING NOTE
Report called to [facility] RN. RN with no questions at this time. Given phone number to [facility] if questions arise.     Radha Giron RN

## 2024-10-21 NOTE — PROGRESS NOTES
10/10/24 1100   Discharge Planning   Who is requesting discharge planning? Patient   Home or Post Acute Services Post acute facilities (Rehab/SNF/etc)   Type of Post Acute Facility Services Skilled nursing   Expected Discharge Disposition SNF  ([facility])     Care Transitions Note  10/10/24  Patient had exchange recently and is now bridging coumadin. Plan to start pre-cert for SNF [facility] on Sunday. PT/OT notes are needed for pre-cert. Pre-cert team requests that PT/OT see patient Friday/Saturday so that pre-cert can begin Sunday in preparation for dc Monday. LSW updated whiteboard to reflect need for PT/OT with these instructions. PT made aware via secure chat. OT not signed in to patient today but will attempt to contact. Following for other dc needs.   YULI Zamorano     UPDATE 10/14/24  Pre-cert initiated by  pre-cert team. Current notes uploaded.   YULI Zamorano     UPDATE 3:36 pm 10/14  Precert Status: Approved.  [employer] Auth ID # [id number].  Dates: 10/14/2024 - 10/16/2024   Will set up transport when final go-ahead is given from medical team. Pt was getting additional work up for ankle pain. 7000 done in HENS. Facility made aware.  YULI Zamorano    UPDATE 10/16/24  Patient planned for discharge tomorrow to [facility] pending improvement in ankle pain. Pain meds adjusted by team. Updates sent to [facility]. LSW asked for extension of pre-cert to tomorrow. Per pre-cert team, pt can admit up to 11:59pm 10/17 on this auth. Otherwise we would need to submit a new request. Team aware.  YULI Zamorano     UPDATE 10/18/24  Pre-cert started this morning by  pre-cert team. Patient planned to admit to [facility] today, per team. LSW following.   YULI Zamorano     UPDATE 10/21/24  Precert Status: Approved. -- as of 10/18 4pm   [employer] Auth ID # [id number].  Dates: 10/18/2024 - 10/22/2024.   Per rounds this morning, patient is medically ready for  discharge. LSW to set up transport to [facility]. Updates sent, facility aware and ready for patient.   YULI Zamorano

## 2024-10-21 NOTE — PROGRESS NOTES
10/21/24 1100   Discharge Planning   Who is requesting discharge planning? Provider   Home or Post Acute Services Post acute facilities (Rehab/SNF/etc)   Type of Post Acute Facility Services Skilled nursing   Expected Discharge Disposition SNF  ([facility])   Does the patient need discharge transport arranged? Yes   RoundTrip coordination needed? Yes   Has discharge transport been arranged? Yes   What day is the transport expected? 10/21/24   What time is the transport expected? 1600     Care Transitions - Discharge Transfer to Facility Note  10/21/2024   Patient will discharge today to: SNF  Facility name: [facility]  Bedside nurse: Radha Giron RN  Report # given to RN: [phone]  Transport arranged: Yes  Transport company name: [email or web address]   time: 4:00 pm  7000 completed in HENS. Patient, mother, and team aware of plan.   YULI Zamorano

## 2024-10-22 NOTE — NURSING NOTE
Pt discharged safely. All belongings sent with pt. Report given to EMS transporters. All questions answered.

## 2024-10-22 NOTE — PROGRESS NOTES
Art Therapy Note    Senait Narvaez     Therapy Session  Referral Type: New referral this admission  Visit Type: Follow-up visit  Session Start Time: 1512  Session End Time: 1514  Intervention Delivery: In-person  Conflict of Service:  (getting ready for DC)  Number of family members present: 0  Family Present for Session: None  Number of staff members present: 2              Treatment/Interventions  Art Therapy Interventions: Empathic listening/validating emotions    Post-assessment  Total Session Time (min): 2 minutes    Narrative  Assessment Detail: ATR made a visit to Pt.;s room to follow up and offer an AT session to help her process diffcult news learned when ATR visited last week.  Pt declined services stating she was about to get bathed for DC at 4:00.  ATR and Pt were talking staff came in to begin the batheing proces and prep for DC.  Pt. expressed appreciation for visit  Follow-up: No follow up is needed at this time from ATR due to DC.  This ATR is signing off.    Education Documentation  No documentation found.

## 2024-10-24 ENCOUNTER — TELEPHONE (OUTPATIENT)
Dept: HEMATOLOGY/ONCOLOGY | Facility: HOSPITAL | Age: 54
End: 2024-10-24
Payer: COMMERCIAL

## 2024-10-28 ENCOUNTER — TELEPHONE (OUTPATIENT)
Dept: HEMATOLOGY/ONCOLOGY | Facility: HOSPITAL | Age: 54
End: 2024-10-28
Payer: COMMERCIAL

## 2024-10-28 DIAGNOSIS — D57.00 HB-SS DISEASE WITH CRISIS: ICD-10-CM

## 2024-10-28 DIAGNOSIS — Z92.89 HISTORY OF EXCHANGE TRANSFUSION: Primary | ICD-10-CM

## 2024-10-28 DIAGNOSIS — D57.00 SICKLE CELL CRISIS (MULTI): ICD-10-CM

## 2024-10-29 ENCOUNTER — SOCIAL WORK (OUTPATIENT)
Dept: CASE MANAGEMENT | Facility: HOSPITAL | Age: 54
End: 2024-10-29
Payer: COMMERCIAL

## 2024-10-30 ENCOUNTER — LAB (OUTPATIENT)
Dept: LAB | Facility: HOSPITAL | Age: 54
DRG: 812 | End: 2024-10-30
Payer: COMMERCIAL

## 2024-10-30 ENCOUNTER — OFFICE VISIT (OUTPATIENT)
Dept: HEMATOLOGY/ONCOLOGY | Facility: HOSPITAL | Age: 54
End: 2024-10-30
Payer: COMMERCIAL

## 2024-10-30 VITALS
DIASTOLIC BLOOD PRESSURE: 67 MMHG | SYSTOLIC BLOOD PRESSURE: 114 MMHG | OXYGEN SATURATION: 96 % | HEART RATE: 84 BPM | RESPIRATION RATE: 20 BRPM | TEMPERATURE: 97.7 F

## 2024-10-30 DIAGNOSIS — G89.4 CHRONIC PAIN SYNDROME: ICD-10-CM

## 2024-10-30 DIAGNOSIS — Z92.89 HISTORY OF EXCHANGE TRANSFUSION: ICD-10-CM

## 2024-10-30 DIAGNOSIS — D57.1 SICKLE CELL ANEMIA WITHOUT CRISIS (MULTI): ICD-10-CM

## 2024-10-30 DIAGNOSIS — D57.219 SICKLE-CELL-HEMOGLOBIN C DISEASE WITH CRISIS (MULTI): ICD-10-CM

## 2024-10-30 LAB
ABO GROUP (TYPE) IN BLOOD: NORMAL
ALBUMIN SERPL BCP-MCNC: 2.6 G/DL (ref 3.4–5)
ALP SERPL-CCNC: 165 U/L (ref 33–110)
ALT SERPL W P-5'-P-CCNC: 21 U/L (ref 7–45)
ANION GAP SERPL CALC-SCNC: 11 MMOL/L (ref 10–20)
ANTIBODY SCREEN: NORMAL
AST SERPL W P-5'-P-CCNC: 41 U/L (ref 9–39)
BASOPHILS # BLD AUTO: 0.03 X10*3/UL (ref 0–0.1)
BASOPHILS NFR BLD AUTO: 0.3 %
BILIRUB SERPL-MCNC: 1.9 MG/DL (ref 0–1.2)
BUN SERPL-MCNC: 7 MG/DL (ref 6–23)
CA-I BLD-SCNC: 1.15 MMOL/L (ref 1.1–1.33)
CALCIUM SERPL-MCNC: 8.6 MG/DL (ref 8.6–10.3)
CHLORIDE SERPL-SCNC: 101 MMOL/L (ref 98–107)
CO2 SERPL-SCNC: 28 MMOL/L (ref 21–32)
CREAT SERPL-MCNC: 0.9 MG/DL (ref 0.5–1.05)
EGFRCR SERPLBLD CKD-EPI 2021: 76 ML/MIN/1.73M*2
EOSINOPHIL # BLD AUTO: 0.18 X10*3/UL (ref 0–0.7)
EOSINOPHIL NFR BLD AUTO: 1.6 %
ERYTHROCYTE [DISTWIDTH] IN BLOOD BY AUTOMATED COUNT: 17.4 % (ref 11.5–14.5)
FERRITIN SERPL-MCNC: 94 NG/ML (ref 8–150)
GLUCOSE SERPL-MCNC: 95 MG/DL (ref 74–99)
HCT VFR BLD AUTO: 29.3 % (ref 36–46)
HGB BLD-MCNC: 10.1 G/DL (ref 12–16)
HGB RETIC QN: 30 PG (ref 28–38)
IMM GRANULOCYTES # BLD AUTO: 0.05 X10*3/UL (ref 0–0.7)
IMM GRANULOCYTES NFR BLD AUTO: 0.4 % (ref 0–0.9)
IMMATURE RETIC FRACTION: 25.7 %
LDH SERPL L TO P-CCNC: 322 U/L (ref 84–246)
LYMPHOCYTES # BLD AUTO: 2.03 X10*3/UL (ref 1.2–4.8)
LYMPHOCYTES NFR BLD AUTO: 18 %
MCH RBC QN AUTO: 29.4 PG (ref 26–34)
MCHC RBC AUTO-ENTMCNC: 34.5 G/DL (ref 32–36)
MCV RBC AUTO: 85 FL (ref 80–100)
MONOCYTES # BLD AUTO: 0.71 X10*3/UL (ref 0.1–1)
MONOCYTES NFR BLD AUTO: 6.3 %
NEUTROPHILS # BLD AUTO: 8.29 X10*3/UL (ref 1.2–7.7)
NEUTROPHILS NFR BLD AUTO: 73.4 %
NRBC BLD-RTO: 1.1 /100 WBCS (ref 0–0)
PLATELET # BLD AUTO: 398 X10*3/UL (ref 150–450)
POTASSIUM SERPL-SCNC: 3.6 MMOL/L (ref 3.5–5.3)
PROT SERPL-MCNC: 6.8 G/DL (ref 6.4–8.2)
RBC # BLD AUTO: 3.44 X10*6/UL (ref 4–5.2)
RETICS #: 0.27 X10*6/UL (ref 0.02–0.08)
RETICS/RBC NFR AUTO: 7.8 % (ref 0.5–2)
RH FACTOR (ANTIGEN D): NORMAL
SODIUM SERPL-SCNC: 136 MMOL/L (ref 136–145)
WBC # BLD AUTO: 11.3 X10*3/UL (ref 4.4–11.3)

## 2024-10-30 PROCEDURE — 99215 OFFICE O/P EST HI 40 MIN: CPT | Performed by: PEDIATRICS

## 2024-10-30 PROCEDURE — 85045 AUTOMATED RETICULOCYTE COUNT: CPT

## 2024-10-30 PROCEDURE — 83021 HEMOGLOBIN CHROMOTOGRAPHY: CPT

## 2024-10-30 PROCEDURE — 83020 HEMOGLOBIN ELECTROPHORESIS: CPT | Performed by: PATHOLOGY

## 2024-10-30 PROCEDURE — 36415 COLL VENOUS BLD VENIPUNCTURE: CPT

## 2024-10-30 PROCEDURE — 86922 COMPATIBILITY TEST ANTIGLOB: CPT

## 2024-10-30 PROCEDURE — 82728 ASSAY OF FERRITIN: CPT

## 2024-10-30 PROCEDURE — 86901 BLOOD TYPING SEROLOGIC RH(D): CPT

## 2024-10-30 PROCEDURE — 80053 COMPREHEN METABOLIC PANEL: CPT

## 2024-10-30 PROCEDURE — 85025 COMPLETE CBC W/AUTO DIFF WBC: CPT

## 2024-10-30 PROCEDURE — G2211 COMPLEX E/M VISIT ADD ON: HCPCS | Performed by: PEDIATRICS

## 2024-10-30 PROCEDURE — 83615 LACTATE (LD) (LDH) ENZYME: CPT

## 2024-10-30 PROCEDURE — 82330 ASSAY OF CALCIUM: CPT

## 2024-10-30 ASSESSMENT — PAIN SCALES - GENERAL: PAINLEVEL_OUTOF10: 8

## 2024-10-31 ENCOUNTER — OFFICE VISIT (OUTPATIENT)
Dept: HEMATOLOGY/ONCOLOGY | Facility: HOSPITAL | Age: 54
DRG: 812 | End: 2024-10-31
Payer: COMMERCIAL

## 2024-10-31 ENCOUNTER — HOSPITAL ENCOUNTER (INPATIENT)
Facility: HOSPITAL | Age: 54
End: 2024-10-31
Attending: OTOLARYNGOLOGY | Admitting: HOSPITALIST
Payer: COMMERCIAL

## 2024-10-31 ENCOUNTER — ANTICOAGULATION - WARFARIN VISIT (OUTPATIENT)
Dept: CARDIOLOGY | Facility: CLINIC | Age: 54
End: 2024-10-31

## 2024-10-31 DIAGNOSIS — R26.9 ABNORMAL GAIT: ICD-10-CM

## 2024-10-31 DIAGNOSIS — R53.81 DEBILITY: ICD-10-CM

## 2024-10-31 DIAGNOSIS — R52 GENERALIZED PAIN: ICD-10-CM

## 2024-10-31 DIAGNOSIS — D57.1 SICKLE CELL DISEASE WITHOUT CRISIS (MULTI): Primary | ICD-10-CM

## 2024-10-31 DIAGNOSIS — I82.210 THROMBOSIS OF SUPERIOR VENA CAVA (MULTI): ICD-10-CM

## 2024-10-31 DIAGNOSIS — D57.1 SICKLE-CELL DISEASE WITHOUT CRISIS (MULTI): ICD-10-CM

## 2024-10-31 DIAGNOSIS — D57.00 SICKLE CELL ANEMIA WITH PAIN (MULTI): ICD-10-CM

## 2024-10-31 DIAGNOSIS — I82.4Z9 DEEP VEIN THROMBOSIS (DVT) OF DISTAL VEIN OF LOWER EXTREMITY, UNSPECIFIED CHRONICITY, UNSPECIFIED LATERALITY (MULTI): ICD-10-CM

## 2024-10-31 DIAGNOSIS — M25.562 ARTHRALGIA OF BOTH LOWER LEGS: ICD-10-CM

## 2024-10-31 DIAGNOSIS — D57.00 SICKLE CELL CRISIS (MULTI): ICD-10-CM

## 2024-10-31 DIAGNOSIS — C56.1 MALIGNANT NEOPLASM OF RIGHT OVARY: ICD-10-CM

## 2024-10-31 DIAGNOSIS — I26.99 RECURRENT PULMONARY EMBOLISM (MULTI): ICD-10-CM

## 2024-10-31 DIAGNOSIS — M25.561 ARTHRALGIA OF BOTH LOWER LEGS: ICD-10-CM

## 2024-10-31 DIAGNOSIS — M87.00 AVN (AVASCULAR NECROSIS OF BONE) (MULTI): ICD-10-CM

## 2024-10-31 DIAGNOSIS — I82.4Y9 DEEP VEIN THROMBOSIS (DVT) OF PROXIMAL LOWER EXTREMITY, UNSPECIFIED CHRONICITY, UNSPECIFIED LATERALITY (MULTI): Primary | ICD-10-CM

## 2024-10-31 LAB
ALBUMIN SERPL BCP-MCNC: 2.9 G/DL (ref 3.4–5)
ALP SERPL-CCNC: 168 U/L (ref 33–110)
ALT SERPL W P-5'-P-CCNC: 19 U/L (ref 7–45)
ANION GAP SERPL CALC-SCNC: 12 MMOL/L (ref 10–20)
APTT PPP: 48 SECONDS (ref 27–38)
AST SERPL W P-5'-P-CCNC: 43 U/L (ref 9–39)
BASOPHILS # BLD AUTO: 0.03 X10*3/UL (ref 0–0.1)
BASOPHILS NFR BLD AUTO: 0.3 %
BILIRUB DIRECT SERPL-MCNC: 0.4 MG/DL (ref 0–0.3)
BILIRUB SERPL-MCNC: 1.9 MG/DL (ref 0–1.2)
BUN SERPL-MCNC: 8 MG/DL (ref 6–23)
CA-I BLD-SCNC: 0.98 MMOL/L (ref 1.1–1.33)
CALCIUM SERPL-MCNC: 7.7 MG/DL (ref 8.6–10.6)
CHLORIDE SERPL-SCNC: 102 MMOL/L (ref 98–107)
CO2 SERPL-SCNC: 28 MMOL/L (ref 21–32)
CREAT SERPL-MCNC: 0.96 MG/DL (ref 0.5–1.05)
EGFRCR SERPLBLD CKD-EPI 2021: 70 ML/MIN/1.73M*2
EOSINOPHIL # BLD AUTO: 0.21 X10*3/UL (ref 0–0.7)
EOSINOPHIL NFR BLD AUTO: 1.9 %
ERYTHROCYTE [DISTWIDTH] IN BLOOD BY AUTOMATED COUNT: 17.8 % (ref 11.5–14.5)
GLUCOSE SERPL-MCNC: 69 MG/DL (ref 74–99)
HCT VFR BLD AUTO: 30.8 % (ref 36–46)
HEMOGLOBIN A2: 3.1 % (ref 2–3.5)
HEMOGLOBIN A: 48.1 % (ref 95.8–98)
HEMOGLOBIN C: 24.5 %
HEMOGLOBIN F: 0.4 % (ref 0–2)
HEMOGLOBIN IDENTIFICATION INTERPRETATION: ABNORMAL
HEMOGLOBIN S: 23.9 %
HGB BLD-MCNC: 10.2 G/DL (ref 12–16)
HGB RETIC QN: 30 PG (ref 28–38)
IMM GRANULOCYTES # BLD AUTO: 0.09 X10*3/UL (ref 0–0.7)
IMM GRANULOCYTES NFR BLD AUTO: 0.8 % (ref 0–0.9)
IMMATURE RETIC FRACTION: 27.3 %
INR PPP: 4 (ref 0.9–1.1)
LDH SERPL L TO P-CCNC: 402 U/L (ref 84–246)
LYMPHOCYTES # BLD AUTO: 2.35 X10*3/UL (ref 1.2–4.8)
LYMPHOCYTES NFR BLD AUTO: 21.6 %
MAGNESIUM SERPL-MCNC: 1.91 MG/DL (ref 1.6–2.4)
MCH RBC QN AUTO: 28.6 PG (ref 26–34)
MCHC RBC AUTO-ENTMCNC: 33.1 G/DL (ref 32–36)
MCV RBC AUTO: 86 FL (ref 80–100)
MONOCYTES # BLD AUTO: 0.72 X10*3/UL (ref 0.1–1)
MONOCYTES NFR BLD AUTO: 6.6 %
NEUTROPHILS # BLD AUTO: 7.46 X10*3/UL (ref 1.2–7.7)
NEUTROPHILS NFR BLD AUTO: 68.8 %
NRBC BLD-RTO: 1 /100 WBCS (ref 0–0)
PATH REVIEW-HGB IDENTIFICATION: ABNORMAL
PHOSPHATE SERPL-MCNC: 3.3 MG/DL (ref 2.5–4.9)
PLATELET # BLD AUTO: 374 X10*3/UL (ref 150–450)
POTASSIUM SERPL-SCNC: 3.7 MMOL/L (ref 3.5–5.3)
PROT SERPL-MCNC: 6.9 G/DL (ref 6.4–8.2)
PROTHROMBIN TIME: 45.4 SECONDS (ref 9.8–12.8)
RBC # BLD AUTO: 3.57 X10*6/UL (ref 4–5.2)
RETICS #: 0.28 X10*6/UL (ref 0.02–0.08)
RETICS/RBC NFR AUTO: 7.8 % (ref 0.5–2)
SODIUM SERPL-SCNC: 138 MMOL/L (ref 136–145)
WBC # BLD AUTO: 10.9 X10*3/UL (ref 4.4–11.3)

## 2024-10-31 PROCEDURE — 83735 ASSAY OF MAGNESIUM: CPT

## 2024-10-31 PROCEDURE — 2500000004 HC RX 250 GENERAL PHARMACY W/ HCPCS (ALT 636 FOR OP/ED)

## 2024-10-31 PROCEDURE — 36415 COLL VENOUS BLD VENIPUNCTURE: CPT

## 2024-10-31 PROCEDURE — 1200000003 HC ONCOLOGY  ROOM WITH TELEMETRY DAILY

## 2024-10-31 PROCEDURE — 85660 RBC SICKLE CELL TEST: CPT

## 2024-10-31 PROCEDURE — 82248 BILIRUBIN DIRECT: CPT

## 2024-10-31 PROCEDURE — 99223 1ST HOSP IP/OBS HIGH 75: CPT

## 2024-10-31 PROCEDURE — 2500000002 HC RX 250 W HCPCS SELF ADMINISTERED DRUGS (ALT 637 FOR MEDICARE OP, ALT 636 FOR OP/ED)

## 2024-10-31 PROCEDURE — 85025 COMPLETE CBC W/AUTO DIFF WBC: CPT

## 2024-10-31 PROCEDURE — 86902 BLOOD TYPE ANTIGEN DONOR EA: CPT

## 2024-10-31 PROCEDURE — 93010 ELECTROCARDIOGRAM REPORT: CPT | Performed by: INTERNAL MEDICINE

## 2024-10-31 PROCEDURE — 83021 HEMOGLOBIN CHROMOTOGRAPHY: CPT

## 2024-10-31 PROCEDURE — 83615 LACTATE (LD) (LDH) ENZYME: CPT

## 2024-10-31 PROCEDURE — 93005 ELECTROCARDIOGRAM TRACING: CPT

## 2024-10-31 PROCEDURE — 85045 AUTOMATED RETICULOCYTE COUNT: CPT

## 2024-10-31 PROCEDURE — 83020 HEMOGLOBIN ELECTROPHORESIS: CPT | Performed by: PATHOLOGY

## 2024-10-31 PROCEDURE — 84100 ASSAY OF PHOSPHORUS: CPT

## 2024-10-31 PROCEDURE — 82330 ASSAY OF CALCIUM: CPT

## 2024-10-31 PROCEDURE — 2500000001 HC RX 250 WO HCPCS SELF ADMINISTERED DRUGS (ALT 637 FOR MEDICARE OP)

## 2024-10-31 PROCEDURE — 80053 COMPREHEN METABOLIC PANEL: CPT

## 2024-10-31 PROCEDURE — 85610 PROTHROMBIN TIME: CPT

## 2024-10-31 RX ORDER — ACETAMINOPHEN 325 MG/1
650 TABLET ORAL EVERY 6 HOURS PRN
Status: ACTIVE | OUTPATIENT
Start: 2024-10-31

## 2024-10-31 RX ORDER — PETROLATUM 420 MG/G
2 OINTMENT TOPICAL 2 TIMES DAILY
Status: DISPENSED | OUTPATIENT
Start: 2024-10-31

## 2024-10-31 RX ORDER — FUROSEMIDE 20 MG/1
20 TABLET ORAL EVERY OTHER DAY
Status: ACTIVE | OUTPATIENT
Start: 2024-11-02

## 2024-10-31 RX ORDER — AMOXICILLIN 250 MG
2 CAPSULE ORAL EVERY 12 HOURS
Status: DISPENSED | OUTPATIENT
Start: 2024-10-31

## 2024-10-31 RX ORDER — POTASSIUM CHLORIDE 20 MEQ/1
40 TABLET, EXTENDED RELEASE ORAL ONCE
Status: COMPLETED | OUTPATIENT
Start: 2024-10-31 | End: 2024-10-31

## 2024-10-31 RX ORDER — HYDROMORPHONE HYDROCHLORIDE 1 MG/ML
0.5 INJECTION, SOLUTION INTRAMUSCULAR; INTRAVENOUS; SUBCUTANEOUS ONCE
Status: COMPLETED | OUTPATIENT
Start: 2024-10-31 | End: 2024-10-31

## 2024-10-31 RX ORDER — ALBUTEROL SULFATE 90 UG/1
2 INHALANT RESPIRATORY (INHALATION) EVERY 6 HOURS PRN
Status: DISPENSED | OUTPATIENT
Start: 2024-10-31

## 2024-10-31 RX ORDER — ONDANSETRON 4 MG/1
4 TABLET, FILM COATED ORAL EVERY 8 HOURS PRN
Status: DISPENSED | OUTPATIENT
Start: 2024-10-31

## 2024-10-31 RX ORDER — OXYCODONE HYDROCHLORIDE 10 MG/1
10 TABLET ORAL EVERY 6 HOURS PRN
Status: DISCONTINUED | OUTPATIENT
Start: 2024-10-31 | End: 2024-11-02

## 2024-10-31 RX ORDER — LANOLIN ALCOHOL/MO/W.PET/CERES
400 CREAM (GRAM) TOPICAL ONCE
Status: COMPLETED | OUTPATIENT
Start: 2024-10-31 | End: 2024-10-31

## 2024-10-31 RX ORDER — POLYETHYLENE GLYCOL 3350 17 G/17G
17 POWDER, FOR SOLUTION ORAL DAILY PRN
Status: ACTIVE | OUTPATIENT
Start: 2024-10-31

## 2024-10-31 RX ORDER — CYCLOBENZAPRINE HCL 10 MG
10 TABLET ORAL 3 TIMES DAILY PRN
Status: ACTIVE | OUTPATIENT
Start: 2024-10-31

## 2024-10-31 RX ORDER — TRIAMCINOLONE ACETONIDE 1 MG/G
CREAM TOPICAL 2 TIMES DAILY
Status: DISPENSED | OUTPATIENT
Start: 2024-10-31

## 2024-10-31 RX ORDER — FLUTICASONE PROPIONATE 50 MCG
2 SPRAY, SUSPENSION (ML) NASAL AS NEEDED
Status: ACTIVE | OUTPATIENT
Start: 2024-10-31

## 2024-10-31 RX ORDER — FOLIC ACID 1 MG/1
1 TABLET ORAL DAILY
Status: DISPENSED | OUTPATIENT
Start: 2024-11-01

## 2024-10-31 RX ORDER — OXYCODONE HYDROCHLORIDE 10 MG/1
20 TABLET ORAL EVERY 6 HOURS PRN
Status: DISCONTINUED | OUTPATIENT
Start: 2024-10-31 | End: 2024-11-02

## 2024-10-31 RX ORDER — ASCORBIC ACID 500 MG
500 TABLET ORAL DAILY
Status: DISPENSED | OUTPATIENT
Start: 2024-10-31

## 2024-10-31 RX ORDER — METOPROLOL TARTRATE 25 MG/1
12.5 TABLET, FILM COATED ORAL 2 TIMES DAILY
Status: DISPENSED | OUTPATIENT
Start: 2024-10-31

## 2024-10-31 RX ORDER — MAGNESIUM SULFATE HEPTAHYDRATE 40 MG/ML
2 INJECTION, SOLUTION INTRAVENOUS ONCE
Status: DISCONTINUED | OUTPATIENT
Start: 2024-10-31 | End: 2024-10-31

## 2024-10-31 RX ORDER — COLCHICINE 0.6 MG/1
0.6 TABLET ORAL 2 TIMES DAILY
Status: DISCONTINUED | OUTPATIENT
Start: 2024-10-31 | End: 2024-11-02

## 2024-10-31 SDOH — SOCIAL STABILITY: SOCIAL INSECURITY: HAVE YOU HAD THOUGHTS OF HARMING ANYONE ELSE?: NO

## 2024-10-31 SDOH — ECONOMIC STABILITY: FOOD INSECURITY: WITHIN THE PAST 12 MONTHS, YOU WORRIED THAT YOUR FOOD WOULD RUN OUT BEFORE YOU GOT THE MONEY TO BUY MORE.: NEVER TRUE

## 2024-10-31 SDOH — SOCIAL STABILITY: SOCIAL INSECURITY: WITHIN THE LAST YEAR, HAVE YOU BEEN AFRAID OF YOUR PARTNER OR EX-PARTNER?: NO

## 2024-10-31 SDOH — ECONOMIC STABILITY: FOOD INSECURITY: WITHIN THE PAST 12 MONTHS, THE FOOD YOU BOUGHT JUST DIDN'T LAST AND YOU DIDN'T HAVE MONEY TO GET MORE.: NEVER TRUE

## 2024-10-31 SDOH — SOCIAL STABILITY: SOCIAL INSECURITY: ABUSE: ADULT

## 2024-10-31 SDOH — SOCIAL STABILITY: SOCIAL INSECURITY: WITHIN THE LAST YEAR, HAVE YOU BEEN HUMILIATED OR EMOTIONALLY ABUSED IN OTHER WAYS BY YOUR PARTNER OR EX-PARTNER?: NO

## 2024-10-31 SDOH — ECONOMIC STABILITY: INCOME INSECURITY: IN THE PAST 12 MONTHS HAS THE ELECTRIC, GAS, OIL, OR WATER COMPANY THREATENED TO SHUT OFF SERVICES IN YOUR HOME?: NO

## 2024-10-31 SDOH — SOCIAL STABILITY: SOCIAL INSECURITY: DO YOU FEEL UNSAFE GOING BACK TO THE PLACE WHERE YOU ARE LIVING?: NO

## 2024-10-31 SDOH — SOCIAL STABILITY: SOCIAL INSECURITY: HAVE YOU HAD ANY THOUGHTS OF HARMING ANYONE ELSE?: NO

## 2024-10-31 SDOH — SOCIAL STABILITY: SOCIAL INSECURITY: HAS ANYONE EVER THREATENED TO HURT YOUR FAMILY OR YOUR PETS?: NO

## 2024-10-31 SDOH — SOCIAL STABILITY: SOCIAL INSECURITY: DOES ANYONE TRY TO KEEP YOU FROM HAVING/CONTACTING OTHER FRIENDS OR DOING THINGS OUTSIDE YOUR HOME?: NO

## 2024-10-31 SDOH — SOCIAL STABILITY: SOCIAL INSECURITY: ARE YOU OR HAVE YOU BEEN THREATENED OR ABUSED PHYSICALLY, EMOTIONALLY, OR SEXUALLY BY ANYONE?: NO

## 2024-10-31 SDOH — SOCIAL STABILITY: SOCIAL INSECURITY: ARE THERE ANY APPARENT SIGNS OF INJURIES/BEHAVIORS THAT COULD BE RELATED TO ABUSE/NEGLECT?: NO

## 2024-10-31 SDOH — SOCIAL STABILITY: SOCIAL INSECURITY: WERE YOU ABLE TO COMPLETE ALL THE BEHAVIORAL HEALTH SCREENINGS?: YES

## 2024-10-31 SDOH — SOCIAL STABILITY: SOCIAL INSECURITY: DO YOU FEEL ANYONE HAS EXPLOITED OR TAKEN ADVANTAGE OF YOU FINANCIALLY OR OF YOUR PERSONAL PROPERTY?: NO

## 2024-10-31 ASSESSMENT — ENCOUNTER SYMPTOMS
ENDOCRINE NEGATIVE: 1
FEVER: 0
COUGH: 0
CHEST TIGHTNESS: 0
NEUROLOGICAL NEGATIVE: 1
GASTROINTESTINAL NEGATIVE: 1
BACK PAIN: 1
SORE THROAT: 0
NERVOUS/ANXIOUS: 0
PSYCHIATRIC NEGATIVE: 1
ALLERGIC/IMMUNOLOGIC NEGATIVE: 1
ARTHRALGIAS: 1
HEMOPTYSIS: 0
HEMATOLOGIC/LYMPHATIC NEGATIVE: 1
BLOOD IN STOOL: 0
WHEEZING: 0
DIAPHORESIS: 0
ADENOPATHY: 0
MUSCULOSKELETAL NEGATIVE: 1
LEG SWELLING: 0
FLANK PAIN: 0
MYALGIAS: 0
FATIGUE: 1
CONSTITUTIONAL NEGATIVE: 1
HEMATURIA: 0
LIGHT-HEADEDNESS: 0
NAUSEA: 0
EYES NEGATIVE: 1
WOUND: 0
BRUISES/BLEEDS EASILY: 0
ABDOMINAL PAIN: 0
VOMITING: 0
HEADACHES: 0
DYSURIA: 0
CARDIOVASCULAR NEGATIVE: 1
NUMBNESS: 0
SCLERAL ICTERUS: 0
CHILLS: 0
PALPITATIONS: 0
EYE PROBLEMS: 0
CONSTIPATION: 0
DIARRHEA: 0
DEPRESSION: 0
RESPIRATORY NEGATIVE: 1
FREQUENCY: 0
EXTREMITY WEAKNESS: 0

## 2024-10-31 ASSESSMENT — COGNITIVE AND FUNCTIONAL STATUS - GENERAL
STANDING UP FROM CHAIR USING ARMS: A LITTLE
DRESSING REGULAR LOWER BODY CLOTHING: A LITTLE
DAILY ACTIVITIY SCORE: 22
STANDING UP FROM CHAIR USING ARMS: A LITTLE
TURNING FROM BACK TO SIDE WHILE IN FLAT BAD: A LITTLE
HELP NEEDED FOR BATHING: A LITTLE
WALKING IN HOSPITAL ROOM: A LITTLE
MOBILITY SCORE: 19
DRESSING REGULAR LOWER BODY CLOTHING: A LITTLE
MOBILITY SCORE: 19
DRESSING REGULAR LOWER BODY CLOTHING: A LITTLE
WALKING IN HOSPITAL ROOM: A LITTLE
CLIMB 3 TO 5 STEPS WITH RAILING: A LITTLE
TURNING FROM BACK TO SIDE WHILE IN FLAT BAD: A LITTLE
MOVING TO AND FROM BED TO CHAIR: A LITTLE
DAILY ACTIVITIY SCORE: 22
CLIMB 3 TO 5 STEPS WITH RAILING: A LITTLE
HELP NEEDED FOR BATHING: A LITTLE
TURNING FROM BACK TO SIDE WHILE IN FLAT BAD: A LITTLE
DAILY ACTIVITIY SCORE: 22
HELP NEEDED FOR BATHING: A LITTLE
MOVING TO AND FROM BED TO CHAIR: A LITTLE
STANDING UP FROM CHAIR USING ARMS: A LITTLE
MOBILITY SCORE: 19
PATIENT BASELINE BEDBOUND: NO
MOVING TO AND FROM BED TO CHAIR: A LITTLE
CLIMB 3 TO 5 STEPS WITH RAILING: A LITTLE
WALKING IN HOSPITAL ROOM: A LITTLE

## 2024-10-31 ASSESSMENT — ACTIVITIES OF DAILY LIVING (ADL)
HEARING - LEFT EAR: FUNCTIONAL
PATIENT'S MEMORY ADEQUATE TO SAFELY COMPLETE DAILY ACTIVITIES?: YES
ADEQUATE_TO_COMPLETE_ADL: YES
WALKS IN HOME: NEEDS ASSISTANCE
LACK_OF_TRANSPORTATION: NO
JUDGMENT_ADEQUATE_SAFELY_COMPLETE_DAILY_ACTIVITIES: YES
FEEDING YOURSELF: NEEDS ASSISTANCE
GROOMING: NEEDS ASSISTANCE
BATHING: NEEDS ASSISTANCE
DRESSING YOURSELF: NEEDS ASSISTANCE
TOILETING: NEEDS ASSISTANCE
HEARING - RIGHT EAR: FUNCTIONAL
ASSISTIVE_DEVICE: WALKER

## 2024-10-31 ASSESSMENT — PAIN SCALES - GENERAL
PAINLEVEL_OUTOF10: 7
PAINLEVEL_OUTOF10: 9
PAINLEVEL_OUTOF10: 9
PAINLEVEL_OUTOF10: 7

## 2024-10-31 ASSESSMENT — PAIN - FUNCTIONAL ASSESSMENT
PAIN_FUNCTIONAL_ASSESSMENT: 0-10

## 2024-10-31 ASSESSMENT — LIFESTYLE VARIABLES
AUDIT-C TOTAL SCORE: 0
HOW MANY STANDARD DRINKS CONTAINING ALCOHOL DO YOU HAVE ON A TYPICAL DAY: PATIENT DOES NOT DRINK
SKIP TO QUESTIONS 9-10: 1
HOW OFTEN DO YOU HAVE A DRINK CONTAINING ALCOHOL: NEVER
AUDIT-C TOTAL SCORE: 0
HOW OFTEN DO YOU HAVE 6 OR MORE DRINKS ON ONE OCCASION: NEVER

## 2024-10-31 ASSESSMENT — PATIENT HEALTH QUESTIONNAIRE - PHQ9
2. FEELING DOWN, DEPRESSED OR HOPELESS: NOT AT ALL
SUM OF ALL RESPONSES TO PHQ9 QUESTIONS 1 & 2: 0
1. LITTLE INTEREST OR PLEASURE IN DOING THINGS: NOT AT ALL

## 2024-10-31 NOTE — H&P
History Of Present Illness  54 y.o. 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 She is being admitted due to her history of arrhythmia and need for tele.    Patient understands why she is being hospitalized. She has no concerns. Denies any CP/SOB, cough, F/C. She endorses some shooting pains up her L leg but otherwise is doing well.     ED COURSE:  - Vital Signs: T 36, HR 77, RR 18, /78, O2 96 RA  - Labs:  CBC: WBC 11.3, Hb 10.1 (recent baseline 8-9), Plt 374  CHEM: Na 138, K 3.7, Cl 102, Bicarb 28, BUN 8, Cr 0.96  LFTs: Alk phos 168, ALT 19, AST 43 (41 10/21), TBili 1.9 (1.3 10/21), DBili 0.4  Calcium: 8.6 (corrected)  COAGS: INR 4 (H), PT 45.4 (H)  - EKG: pending     Past Medical History  She has a past medical history of Asthma, CHF (congestive heart failure), Chronic pain disorder, Compression of vein (02/19/2020), and Hypotension, unspecified (12/10/2020).    Surgical History  She has a past surgical history that includes Appendectomy (08/05/2013); Cholecystectomy (08/05/2013); Other surgical history (05/13/2014); Other surgical history (10/09/2014); Other surgical history (06/09/2014); MR angio head wo IV contrast (8/16/2014); MR angio neck wo IV contrast (8/16/2014); IR CVC tunneled (3/13/2015); IR CVC tunneled (1/29/2016); IR CVC tunneled (1/17/2018); IR CVC tunneled (2/22/2018); IR CVC tunneled (3/22/2018); IR CVC tunneled (4/18/2018); IR CVC tunneled (5/16/2018); IR CVC tunneled (6/12/2018); US guided biopsy lymph node superficial (9/17/2019); CT guided percutaneous biopsy LYMPH node superficial (9/19/2019); IR CVC tunneled (1/21/2020); IR CVC tunneled (2/28/2020); IR CVC  tunneled (4/10/2020); IR CVC tunneled (5/22/2020); IR CVC tunneled (6/19/2020); IR CVC tunneled (7/23/2020); IR CVC tunneled (9/4/2020); IR CVC tunneled (10/9/2020); IR CVC tunneled (11/13/2020); IR CVC tunneled (12/18/2020); IR CVC tunneled (1/22/2021); IR CVC tunneled (2/26/2021); IR CVC tunneled (4/2/2021); IR CVC tunneled (5/7/2021); IR CVC tunneled (6/11/2021); IR CVC tunneled (7/16/2021); IR CVC tunneled (8/20/2021); IR CVC tunneled (9/24/2021); IR CVC tunneled (10/29/2021); IR CVC tunneled (12/3/2021); IR CVC tunneled (1/7/2022); IR CVC tunneled (2/23/2022); IR CVC tunneled (3/25/2022); IR CVC tunneled (4/22/2022); IR CVC tunneled (6/3/2022); IR CVC tunneled (9/18/2017); IR CVC tunneled (10/30/2017); IR CVC tunneled (12/19/2017); IR CVC tunneled (1/6/2023); IR CVC tunneled (2/24/2023); IR CVC tunneled (7/8/2022); IR CVC tunneled (8/12/2022); IR CVC tunneled (9/16/2022); CT angio neck (10/19/2022); IR CVC tunneled (10/20/2022); IR CVC tunneled (11/29/2022); IR CVC tunneled (3/31/2023); IR CVC tunneled (6/9/2023); IR CVC tunneled (7/20/2023); IR CVC tunneled (8/16/2023); IR CVC tunneled (9/21/2023); IR CVC nontunneled (10/26/2023); IR CVC nontunneled (12/7/2023); and Biopsy (9/15/2024).     Social History  She reports that she quit smoking about 10 years ago. Her smoking use included cigarettes. She has been exposed to tobacco smoke. She has never used smokeless tobacco. She reports that she does not currently use alcohol. She reports that she does not currently use drugs.    Family History  Family History   Problem Relation Name Age of Onset    Diabetes Father      Gout Father      Sickle cell trait Father      Seizures Sister      Diabetes Other      Uterine cancer Other      Other (congenital blindness) Cousin          Allergies  Vancomycin and Oxycontin [oxycodone]    Review of Systems   Constitutional: Negative.    HENT: Negative.     Eyes: Negative.    Respiratory: Negative.     Cardiovascular: Negative.     Gastrointestinal: Negative.    Endocrine: Negative.    Genitourinary: Negative.    Musculoskeletal: Negative.    Skin: Negative.    Allergic/Immunologic: Negative.    Neurological: Negative.    Hematological: Negative.    Psychiatric/Behavioral: Negative.       PHYSICAL EXAM:  General: awake, alert, conversant, appears stated age  HEENT: pupils equal and round, no scleral icterus or conjunctivitis  Skin: no suspect lesions or rashes noted on visible skin  Chest: ctab, normal respiratory effort, not on supplemental oxygen  Cardiac: regular rate and rhythm, normal s1, s2, no M/R/G, no JVD  Abdomen: soft, ND, NT, no involuntary guarding  : no flank pain or indwelling urinary catheter  EXT: RLE swelling  MSK: no focal joint swelling noted  Neuro: AOx4, moving all limbs spontaneously, follows commands  Psych: coherent thought process, appropriate mood and affect    Last Recorded Vitals  /78 (BP Location: Left arm, Patient Position: Lying)   Pulse 77   Temp 36 °C (96.8 °F) (Temporal)   Resp 18   Wt 108 kg (238 lb 12.8 oz)   SpO2 96%     Relevant Results  Scheduled medications  ascorbic acid, 500 mg, oral, Daily  colchicine, 0.6 mg, oral, BID  [START ON 11/1/2024] folic acid, 1 mg, oral, Daily  [START ON 11/2/2024] furosemide, 20 mg, oral, Every other day  magnesium oxide, 400 mg, oral, Once  metoprolol tartrate, 12.5 mg, oral, BID  phytonadione, 2.5 mg, intravenous, Once  potassium chloride CR, 40 mEq, oral, Once  sennosides-docusate sodium, 2 tablet, oral, q12h  triamcinolone, , Topical, BID  white petrolatum, 2 Application, Topical, BID      Continuous medications     PRN medications  PRN medications: acetaminophen, albuterol, cyclobenzaprine, fluticasone, ondansetron, oxyCODONE, oxyCODONE, polyethylene glycol    Results for orders placed or performed during the hospital encounter of 10/31/24 (from the past 24 hours)   CBC and Auto Differential   Result Value Ref Range    WBC 10.9 4.4 - 11.3 x10*3/uL     nRBC 1.0 (H) 0.0 - 0.0 /100 WBCs    RBC 3.57 (L) 4.00 - 5.20 x10*6/uL    Hemoglobin 10.2 (L) 12.0 - 16.0 g/dL    Hematocrit 30.8 (L) 36.0 - 46.0 %    MCV 86 80 - 100 fL    MCH 28.6 26.0 - 34.0 pg    MCHC 33.1 32.0 - 36.0 g/dL    RDW 17.8 (H) 11.5 - 14.5 %    Platelets 374 150 - 450 x10*3/uL    Neutrophils % 68.8 40.0 - 80.0 %    Immature Granulocytes %, Automated 0.8 0.0 - 0.9 %    Lymphocytes % 21.6 13.0 - 44.0 %    Monocytes % 6.6 2.0 - 10.0 %    Eosinophils % 1.9 0.0 - 6.0 %    Basophils % 0.3 0.0 - 2.0 %    Neutrophils Absolute 7.46 1.20 - 7.70 x10*3/uL    Immature Granulocytes Absolute, Automated 0.09 0.00 - 0.70 x10*3/uL    Lymphocytes Absolute 2.35 1.20 - 4.80 x10*3/uL    Monocytes Absolute 0.72 0.10 - 1.00 x10*3/uL    Eosinophils Absolute 0.21 0.00 - 0.70 x10*3/uL    Basophils Absolute 0.03 0.00 - 0.10 x10*3/uL   Hepatic function panel   Result Value Ref Range    Albumin 2.9 (L) 3.4 - 5.0 g/dL    Bilirubin, Total 1.9 (H) 0.0 - 1.2 mg/dL    Bilirubin, Direct 0.4 (H) 0.0 - 0.3 mg/dL    Alkaline Phosphatase 168 (H) 33 - 110 U/L    ALT 19 7 - 45 U/L    AST 43 (H) 9 - 39 U/L    Total Protein 6.9 6.4 - 8.2 g/dL   Magnesium   Result Value Ref Range    Magnesium 1.91 1.60 - 2.40 mg/dL   Lactate dehydrogenase   Result Value Ref Range     (H) 84 - 246 U/L   Reticulocytes   Result Value Ref Range    Retic % 7.8 (H) 0.5 - 2.0 %    Retic Absolute 0.279 (H) 0.018 - 0.083 x10*6/uL    Reticulocyte Hemoglobin 30 28 - 38 pg    Immature Retic fraction 27.3 (H) <=16.0 %   Coagulation Screen   Result Value Ref Range    Protime 45.4 (H) 9.8 - 12.8 seconds    INR 4.0 (H) 0.9 - 1.1    aPTT 48 (H) 27 - 38 seconds   Phosphorus   Result Value Ref Range    Phosphorus 3.3 2.5 - 4.9 mg/dL   Basic Metabolic Panel   Result Value Ref Range    Glucose 69 (L) 74 - 99 mg/dL    Sodium 138 136 - 145 mmol/L    Potassium 3.7 3.5 - 5.3 mmol/L    Chloride 102 98 - 107 mmol/L    Bicarbonate 28 21 - 32 mmol/L    Anion Gap 12 10 - 20 mmol/L     Urea Nitrogen 8 6 - 23 mg/dL    Creatinine 0.96 0.50 - 1.05 mg/dL    eGFR 70 >60 mL/min/1.73m*2    Calcium 7.7 (L) 8.6 - 10.6 mg/dL   Prepare RBC: 3 Units   Result Value Ref Range    PRODUCT CODE Q2946D16     Unit Number [id number]     Unit ABO B     Unit RH POS     XM INTEP COMP     Dispense Status XM     Blood Expiration Date 11/21/2024 11:59:00 PM EST     PRODUCT BLOOD TYPE 7300     UNIT VOLUME 350     PRODUCT CODE N6091L94     Unit Number [id number]     Unit ABO B     Unit RH POS     XM INTEP COMP     Dispense Status XM     Blood Expiration Date 11/22/2024 11:59:00 PM EST     PRODUCT BLOOD TYPE 7300     UNIT VOLUME 350     PRODUCT CODE Y7037W12     Unit Number [id number]     Unit ABO B     Unit RH POS     XM INTEP COMP     Dispense Status XM     Blood Expiration Date 11/23/2024 11:59:00 PM EST     PRODUCT BLOOD TYPE 7300     UNIT VOLUME 350      *Note: Due to a large number of results and/or encounters for the requested time period, some results have not been displayed. A complete set of results can be found in Results Review.     BI mammo bilateral diagnostic tomosynthesis    Result Date: 10/16/2024  Interpreted By:  Debibe Magallon and Liller Gregory STUDY: BI MAMMO BILATERAL DIAGNOSTIC TOMOSYNTHESIS; BI US BREAST LIMITED LEFT;  10/16/2024 10:21 am; 10/16/2024 10:59 am   ACCESSION NUMBER(S): [id number]; [id number]   ORDERING CLINICIAN: ANGELA MEJIA   INDICATION: Patient presents with skin hardening and discoloration of the left breast. History of sickle cell disease   COMPARISON: Mammogram 08/16/2023, 01/18/2021, 12/30/2019, breast ultrasound 11/27/2023.   FINDINGS: MAMMOGRAPHY: 2D and tomosynthesis images were reviewed at 1 mm slice thickness.   Density:  There are scattered areas of fibroglandular density.   There is marked asymmetric diffuse left breast skin thickening and trabecular thickening. No suspicious masses or calcifications are identified in either breast. A  slightly prominent lymph node is again seen in the lower left axilla, similar compared to previous mammograms.   ULTRASOUND: Targeted ultrasound was performed of the entire left breast by a registered sonographer with elastography. There is diffuse skin thickening of the left breast measuring up to 0.7 cm and diffuse parenchymal edema, correlating with mammographic findings. No focal abnormality is identified on the ultrasound examination.       1. Marked left breast diffuse edema, suggestive of vaso-occlusive etiology. Clinical correlation and management till resolution is recommended. 2. No mammographic or targeted sonographic evidence of malignancy.   BI-RADS CATEGORY: BI-RADS Category:  2 Benign. Recommendation:  Clinical follow-up. Recommended Date:  Immediate. Laterality:  Left.   For any future breast imaging appointments, please call [phone]).   I personally reviewed the images/study and I agree with the findings as stated above by resident physician, Dr. Quan Bergman.   MACRO: None   Signed by: Debbie Magallon 10/16/2024 11:41 AM Dictation workstation:   [id number]    BI US breast limited left    Result Date: 10/16/2024  Interpreted By:  Debbie Magallon and Liller Gregory STUDY: BI MAMMO BILATERAL DIAGNOSTIC TOMOSYNTHESIS; BI US BREAST LIMITED LEFT;  10/16/2024 10:21 am; 10/16/2024 10:59 am   ACCESSION NUMBER(S): [id number]; [id number]   ORDERING CLINICIAN: ANGELA MEJIA   INDICATION: Patient presents with skin hardening and discoloration of the left breast. History of sickle cell disease   COMPARISON: Mammogram 08/16/2023, 01/18/2021, 12/30/2019, breast ultrasound 11/27/2023.   FINDINGS: MAMMOGRAPHY: 2D and tomosynthesis images were reviewed at 1 mm slice thickness.   Density:  There are scattered areas of fibroglandular density.   There is marked asymmetric diffuse left breast skin thickening and trabecular thickening. No suspicious masses or calcifications are  identified in either breast. A slightly prominent lymph node is again seen in the lower left axilla, similar compared to previous mammograms.   ULTRASOUND: Targeted ultrasound was performed of the entire left breast by a registered sonographer with elastography. There is diffuse skin thickening of the left breast measuring up to 0.7 cm and diffuse parenchymal edema, correlating with mammographic findings. No focal abnormality is identified on the ultrasound examination.       1. Marked left breast diffuse edema, suggestive of vaso-occlusive etiology. Clinical correlation and management till resolution is recommended. 2. No mammographic or targeted sonographic evidence of malignancy.   BI-RADS CATEGORY: BI-RADS Category:  2 Benign. Recommendation:  Clinical follow-up. Recommended Date:  Immediate. Laterality:  Left.   For any future breast imaging appointments, please call [phone]).   I personally reviewed the images/study and I agree with the findings as stated above by resident physician, Dr. Quan Bergman.   MACRO: None   Signed by: Debbie Magallon 10/16/2024 11:41 AM Dictation workstation:   [id number]    CT ankle left with IV contrast    Result Date: 10/14/2024  Interpreted By:  Vivek Ji and Lawrence Austen STUDY: CT ANKLE LEFT W IV CONTRAST;  10/13/2024 4:00 pm   INDICATION: Signs/Symptoms:sickle cell, increased ankle pain.   COMPARISON: Radiographs 10/08/2024.   ACCESSION NUMBER(S): [id number]   ORDERING CLINICIAN: DARIN ANTONIO   TECHNIQUE: CT imaging of the  left ankle was obtained  with administration of intravenous contrast medium. Coronal and sagittal reformatted images were performed.   FINDINGS: OSSEOUS STRUCTURES: No acute fractures. Remote fracture deformity of the distal fibula metaphysis. Joint alignment is maintained. Mild subchondral sclerotic changes and joint space narrowing of the midfoot. There is mild sclerotic and subchondral cystic changes of the tibiotalar  joint. No evidence of serpiginous sclerosis or lucency to definitively suggest avascular necrosis.   SOFT TISSUES: Diffuse soft tissue edema of the distal lower extremity and ankle without focal fluid collection or abnormal enhancement. The visualized ligamentous and tendinous structures are intact.       1. Diffuse nonspecific edema of the distal lower extremity and ankle without focal fluid collection or abnormal enhancement. Findings may represent vascular congestion, and cellulitis is felt less likely with the lack of enhancement but is not entirely excluded. No acute osseous findings. 2. No osseous manifestations of avascular necrosis within the limitations of CT modality. 3. Mild degenerative changes of the mid and hindfoot.     MACRO: None   Signed by: Vivek Ji 10/14/2024 11:09 AM Dictation workstation:   [id number]    XR ankle left 3+ views    Result Date: 10/9/2024  Interpreted By:  Srinivasa Akhtar, STUDY: XR ANKLE LEFT 3+ VIEWS; ;  10/8/2024 6:50 pm   INDICATION: Signs/Symptoms:sickle cell, increased left ankle pain.     COMPARISON: None.   ACCESSION NUMBER(S): [id number]   ORDERING CLINICIAN: DARIN ANTONIO   FINDINGS: Three views of the left ankle.   No acute fracture. Joint alignment is maintained. Soft tissue edema of the ankle.       No acute fractures. Soft tissue edema of the ankle.     MACRO: None   Signed by: Srinivasa Akhtar 10/9/2024 8:48 AM Dictation workstation:   [id number]    IR CVC nontunneled    Result Date: 10/8/2024  Interpreted By:  Harjinder Swan and Stevens Alex STUDY: IR CVC NONTUNNELED;  10/7/2024 10:59 am   INDICATION: Signs/Symptoms:apheresis line placement for RBCEx (typically femoral for her).   COMPARISON: None.   ACCESSION NUMBER(S): [id number]   ORDERING CLINICIAN: ALEXANDRA ARRIAGA   TECHNIQUE: INTERVENTIONALIST(S): BLAYNE Red MD   CONSENT: The patient/patient's POA/next of kin was informed of the nature of the proposed procedure. The  purposes, alternatives, risks, and benefits were explained and discussed. All questions were answered and consent was obtained.   RADIATION EXPOSURE: Fluoroscopy time: 0.1 min Dose: 3.34 mGy Dose Area Product (DAP): 714 mGy*cm^2   SEDATION: Moderate conscious IV sedation services (supervision of administration, induction, and maintenance) were provided by the physician performing the procedure with intravenous fentanyl 50mcg and versed 1mg. The physician was assisted by an independent trained observer, an interventional radiology nurse, in the continuous monitoring of patient level of consciousness and physiologic status.   MEDICATION: None.   TIME OUT: A time out was performed immediately prior to procedure start with the interventional team, correctly identifying the patient name, date of birth, MRN, procedure, anatomy (including marking of site and side), patient position, procedure consent form, relevant laboratory and imaging test results, antibiotic administration, safety precautions, and procedure-specific equipment needs.   COMPLICATIONS: No immediate adverse events identified.   FINDINGS: The patient was positioned supine on the angiography table. The right groin cutaneous tissues were prepared and draped in sterile manner. 1% lidocaine local anesthesia was instilled into the subcutaneous soft tissues at the selected access site for local anesthesia. Ultrasound images demonstrate a patent and compressible  right femoral vein. Utilizing direct ultrasound guidance and micropuncture/Seldinger technique, the  right  femoral vein was accessed. An ultrasound digital spot image was acquired and stored on the  PACS. After confirmation of location, a 018 [place]-Mandrel guidewire was introduced and advanced into the inferior vena cava utilizing intermittent fluoroscopy.   The needle was removed with the guidewire left in place and exchanged for a 5-[place] coaxial dilator.  The inner dilator and wire were removed and  a J-tipped 035 guidewire was introduced through the access sheath.  The skin tract was dilated with successive increase in size of vascular dilators under direct fluoroscopic visualization.   Subsequently, a temporary 13 [place] x 24 cm catheter was advanced with its tip overlying the cavoatrial junction under direct fluoroscopic guidance.  The catheter ports were aspirated and flushed with normal saline and charged with heparin. The external portions of the catheter were secured in place with sterile suture dressings.   The patient tolerated the procedure without complication.       1. Technically successful and uncomplicated placement of a right femoral temporary Trialysis catheter under direct ultrasound and fluoroscopic guidance - optimal catheter tip position at the inferior vena cava and the catheter is ready for immediate use.   I was present for and/or performed the critical portions of the procedure and immediately available throughout the entire procedure.   I personally reviewed the image(s) / study and resident interpretation. I agree with the findings as stated.   Performed and dictated at [facility].   MACRO: None.   Signed by: Harjinder Swan 10/8/2024 8:04 AM Dictation workstation:   [id number]    Lower extremity venous duplex bilateral    Result Date: 10/5/2024  Interpreted By:  Alejandro Mendes,  Zoe White STUDY: [facility] US LOWER EXTREMITY VENOUS DUPLEX BILATERAL;  10/5/2024 10:30 am   INDICATION: Signs/Symptoms:LLE pain and edema c/f DVT.   ,R60.0 Localized edema   COMPARISON: None.   ACCESSION NUMBER(S): [id number]   ORDERING CLINICIAN: ALEXANDRA ARRIAGA   TECHNIQUE: Vascular ultrasound of the bilateral lower extremities was performed. Real-time compression views as well as Gray scale, color Doppler and spectral Doppler waveform analysis was performed.   FINDINGS: Evaluation of the visualized portions of the bilateral common femoral vein,  proximal, mid, and distal femoral vein, and popliteal vein were performed.  Evaluation of the visualized portions of the  posterior tibial and peroneal veins were also performed.   Limitations:  Patient body habitus and lower extremity edema. All findings are reported within these limitations.   The evaluated veins demonstrate normal compressibility. There is intact venous flow demonstrating normal respiratory variability and normal augmentation of flow with calf compression. Therefore, there is no ultrasonographic evidence for deep vein thrombosis within the evaluated veins.   Respiratory variation and augmentation to calf pressure was noted.       No sonographic evidence for deep vein thrombosis within the evaluated veins of the bilateral lower extremity.   I personally reviewed the images/study and I agree with the findings as stated by Christopher Soriano MD. This study was interpreted at [facility], [place], [place]   MACRO: None   Signed by: Alejandro Mendes 10/5/2024 2:35 PM Dictation workstation:   [id number]      Assessment/Plan   Assessment & Plan  Sickle cell anemia with pain (Multi)    Sickle cell disease without crisis (Multi)    ASSESSMENT: 54 y.o. 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 No c/f for sickle cell pain crisis at this time. NPO @ [facility]. Giving x1 dose vitamin K to help improve INR. Daily hemolysis labs. Will c/w all home pain meds.    PLAN:    #HbSC SCD  - receives exchange transfusions q6 weeks  -daily LDH, hapto, fibrinogen, retic, bili   -no leukocytosis on admission  -HbS pending  - Plan for IR placement of CVC for exchange  transfusion tomorrow 11/1 AM. Admission coags with INR 4. Over the phone, tentatively OK with INR 4 but prefer it be closer to 3. Will give x1 IV vitamin K 2.5 mg today and repeat coags in AM  - NPO @ [facility]  - No changes in her current home oral regimen and this is with   Oral tylenol 650 mg every 6 hours prn for mild to moderate pain   Oral oxycodone 10-20 mg every 4-6 hours as needed for moderate to severe and breakthrough pain    Non pharmacologic methods of pain control   Flexeril 10 mg q3h prn as needed as a muscle relaxant  Reviewed [place]AR[place]  Avoid ER oxycontin as contributes to AMS  - HOLD home warfarin 5 mg daily until after procedure  - Compression stockings for swelling of RLE   - Continue 2L night time oxygen   - Daily folic acid 1 mg   - Doc senna 2 tab BID and miralax prn for chronic constipation   - zofran prn for nausea    #DVT, PE  #SVC thrombus  -holding home coumadin given INR 4 and plan for IR procedure tomorrow  -will give x1 IV vit K 2.5 mg  -daily coags    #SVT  #pHTN  -metop 12.5 BID  -lasix 20 every other day     F: prn  E: K>4, Mg>2  N: regular, NPO @ [facility]  A: pIV  DVT PPx: holding coumadin given INR of 4  GI PPx: no indication  - Derm FUV 11/7, Sickle Cell FUV 11/18, Cardiology FUV 2/13    CODE STATUS: FULL (confirmed on admission)   SURROGATE DECISION MAKER: Tati (mom) [phone]         Lisa Giles MD

## 2024-11-01 ENCOUNTER — APPOINTMENT (OUTPATIENT)
Dept: OTHER | Facility: HOSPITAL | Age: 54
DRG: 812 | End: 2024-11-01
Payer: COMMERCIAL

## 2024-11-01 ENCOUNTER — HOSPITAL ENCOUNTER (OUTPATIENT)
Dept: RADIOLOGY | Facility: HOSPITAL | Age: 54
Discharge: HOME | End: 2024-11-01
Payer: COMMERCIAL

## 2024-11-01 VITALS
SYSTOLIC BLOOD PRESSURE: 97 MMHG | HEART RATE: 83 BPM | DIASTOLIC BLOOD PRESSURE: 55 MMHG | OXYGEN SATURATION: 95 % | RESPIRATION RATE: 16 BRPM

## 2024-11-01 DIAGNOSIS — D57.219 SICKLE-CELL-HEMOGLOBIN C DISEASE WITH CRISIS (MULTI): ICD-10-CM

## 2024-11-01 DIAGNOSIS — Z92.89 HISTORY OF EXCHANGE TRANSFUSION: ICD-10-CM

## 2024-11-01 DIAGNOSIS — G89.4 CHRONIC PAIN SYNDROME: ICD-10-CM

## 2024-11-01 LAB
ALBUMIN SERPL BCP-MCNC: 2.5 G/DL (ref 3.4–5)
ALP SERPL-CCNC: 159 U/L (ref 33–110)
ALT SERPL W P-5'-P-CCNC: 16 U/L (ref 7–45)
ANION GAP SERPL CALC-SCNC: 13 MMOL/L (ref 10–20)
APTT PPP: 39 SECONDS (ref 27–38)
AST SERPL W P-5'-P-CCNC: 43 U/L (ref 9–39)
BASOPHILS # BLD AUTO: 0.05 X10*3/UL (ref 0–0.1)
BASOPHILS NFR BLD AUTO: 0.4 %
BILIRUB DIRECT SERPL-MCNC: 0.3 MG/DL (ref 0–0.3)
BILIRUB SERPL-MCNC: 1.6 MG/DL (ref 0–1.2)
BLOOD EXPIRATION DATE: NORMAL
BUN SERPL-MCNC: 7 MG/DL (ref 6–23)
CA-I BLD-SCNC: 1.04 MMOL/L (ref 1.1–1.33)
CALCIUM SERPL-MCNC: 8.4 MG/DL (ref 8.6–10.6)
CHLORIDE SERPL-SCNC: 102 MMOL/L (ref 98–107)
CO2 SERPL-SCNC: 25 MMOL/L (ref 21–32)
CREAT SERPL-MCNC: 0.94 MG/DL (ref 0.5–1.05)
DISPENSE STATUS: NORMAL
EGFRCR SERPLBLD CKD-EPI 2021: 72 ML/MIN/1.73M*2
EOSINOPHIL # BLD AUTO: 0.42 X10*3/UL (ref 0–0.7)
EOSINOPHIL NFR BLD AUTO: 3.7 %
ERYTHROCYTE [DISTWIDTH] IN BLOOD BY AUTOMATED COUNT: 16.9 % (ref 11.5–14.5)
ERYTHROCYTE [DISTWIDTH] IN BLOOD BY AUTOMATED COUNT: 18.9 % (ref 11.5–14.5)
GLUCOSE SERPL-MCNC: 70 MG/DL (ref 74–99)
HCT VFR BLD AUTO: 28 % (ref 36–46)
HCT VFR BLD AUTO: 29.6 % (ref 36–46)
HEMOGLOBIN A2: ABNORMAL
HEMOGLOBIN A: ABNORMAL
HEMOGLOBIN C: ABNORMAL
HEMOGLOBIN F: ABNORMAL
HEMOGLOBIN IDENTIFICATION INTERPRETATION: ABNORMAL
HEMOGLOBIN S: 24.3 %
HGB BLD-MCNC: 9.5 G/DL (ref 12–16)
HGB BLD-MCNC: 9.7 G/DL (ref 12–16)
HGB RETIC QN: 29 PG (ref 28–38)
IMM GRANULOCYTES # BLD AUTO: 0.15 X10*3/UL (ref 0–0.7)
IMM GRANULOCYTES NFR BLD AUTO: 1.3 % (ref 0–0.9)
IMMATURE RETIC FRACTION: 22.4 %
INR PPP: 2.9 (ref 0.9–1.1)
LDH SERPL L TO P-CCNC: 366 U/L (ref 84–246)
LYMPHOCYTES # BLD AUTO: 3.39 X10*3/UL (ref 1.2–4.8)
LYMPHOCYTES NFR BLD AUTO: 29.8 %
MAGNESIUM SERPL-MCNC: 1.89 MG/DL (ref 1.6–2.4)
MCH RBC QN AUTO: 28.9 PG (ref 26–34)
MCH RBC QN AUTO: 29.7 PG (ref 26–34)
MCHC RBC AUTO-ENTMCNC: 32.8 G/DL (ref 32–36)
MCHC RBC AUTO-ENTMCNC: 33.9 G/DL (ref 32–36)
MCV RBC AUTO: 88 FL (ref 80–100)
MCV RBC AUTO: 88 FL (ref 80–100)
MONOCYTES # BLD AUTO: 1.28 X10*3/UL (ref 0.1–1)
MONOCYTES NFR BLD AUTO: 11.2 %
NEUTROPHILS # BLD AUTO: 6.09 X10*3/UL (ref 1.2–7.7)
NEUTROPHILS NFR BLD AUTO: 53.6 %
NRBC BLD-RTO: 0.6 /100 WBCS (ref 0–0)
NRBC BLD-RTO: 0.7 /100 WBCS (ref 0–0)
PATH REVIEW-HGB IDENTIFICATION: ABNORMAL
PHOSPHATE SERPL-MCNC: 3.6 MG/DL (ref 2.5–4.9)
PLATELET # BLD AUTO: 227 X10*3/UL (ref 150–450)
PLATELET # BLD AUTO: 277 X10*3/UL (ref 150–450)
POTASSIUM SERPL-SCNC: 3.7 MMOL/L (ref 3.5–5.3)
PRODUCT BLOOD TYPE: 7300
PRODUCT CODE: NORMAL
PROT SERPL-MCNC: 6.1 G/DL (ref 6.4–8.2)
PROTHROMBIN TIME: 33.5 SECONDS (ref 9.8–12.8)
RBC # BLD AUTO: 3.2 X10*6/UL (ref 4–5.2)
RBC # BLD AUTO: 3.36 X10*6/UL (ref 4–5.2)
RETICS #: 0.31 X10*6/UL (ref 0.02–0.08)
RETICS/RBC NFR AUTO: 9.3 % (ref 0.5–2)
SODIUM SERPL-SCNC: 136 MMOL/L (ref 136–145)
UNIT ABO: NORMAL
UNIT NUMBER: NORMAL
UNIT RH: NORMAL
UNIT VOLUME: 350
WBC # BLD AUTO: 11.4 X10*3/UL (ref 4.4–11.3)
WBC # BLD AUTO: 9 X10*3/UL (ref 4.4–11.3)
XM INTEP: NORMAL

## 2024-11-01 PROCEDURE — 2500000004 HC RX 250 GENERAL PHARMACY W/ HCPCS (ALT 636 FOR OP/ED): Mod: JZ

## 2024-11-01 PROCEDURE — 2780000003 HC OR 278 NO HCPCS

## 2024-11-01 PROCEDURE — C1769 GUIDE WIRE: HCPCS

## 2024-11-01 PROCEDURE — 36430 TRANSFUSION BLD/BLD COMPNT: CPT

## 2024-11-01 PROCEDURE — 36556 INSERT NON-TUNNEL CV CATH: CPT | Performed by: RADIOLOGY

## 2024-11-01 PROCEDURE — 7100000009 HC PHASE TWO TIME - INITIAL BASE CHARGE

## 2024-11-01 PROCEDURE — 7100000010 HC PHASE TWO TIME - EACH INCREMENTAL 1 MINUTE

## 2024-11-01 PROCEDURE — 99233 SBSQ HOSP IP/OBS HIGH 50: CPT

## 2024-11-01 PROCEDURE — 2500000001 HC RX 250 WO HCPCS SELF ADMINISTERED DRUGS (ALT 637 FOR MEDICARE OP)

## 2024-11-01 PROCEDURE — 85025 COMPLETE CBC W/AUTO DIFF WBC: CPT

## 2024-11-01 PROCEDURE — 2500000005 HC RX 250 GENERAL PHARMACY W/O HCPCS

## 2024-11-01 PROCEDURE — 82248 BILIRUBIN DIRECT: CPT

## 2024-11-01 PROCEDURE — 97165 OT EVAL LOW COMPLEX 30 MIN: CPT | Mod: GO | Performed by: OCCUPATIONAL THERAPIST

## 2024-11-01 PROCEDURE — P9016 RBC LEUKOCYTES REDUCED: HCPCS

## 2024-11-01 PROCEDURE — 2720000007 HC OR 272 NO HCPCS

## 2024-11-01 PROCEDURE — C1752 CATH,HEMODIALYSIS,SHORT-TERM: HCPCS

## 2024-11-01 PROCEDURE — 83021 HEMOGLOBIN CHROMOTOGRAPHY: CPT

## 2024-11-01 PROCEDURE — 1200000003 HC ONCOLOGY  ROOM WITH TELEMETRY DAILY

## 2024-11-01 PROCEDURE — 84100 ASSAY OF PHOSPHORUS: CPT

## 2024-11-01 PROCEDURE — 36415 COLL VENOUS BLD VENIPUNCTURE: CPT

## 2024-11-01 PROCEDURE — C1894 INTRO/SHEATH, NON-LASER: HCPCS

## 2024-11-01 PROCEDURE — 36512 APHERESIS RBC: CPT | Performed by: STUDENT IN AN ORGANIZED HEALTH CARE EDUCATION/TRAINING PROGRAM

## 2024-11-01 PROCEDURE — 30243H1 TRANSFUSION OF NONAUTOLOGOUS WHOLE BLOOD INTO CENTRAL VEIN, PERCUTANEOUS APPROACH: ICD-10-PCS | Performed by: HOSPITALIST

## 2024-11-01 PROCEDURE — 2500000004 HC RX 250 GENERAL PHARMACY W/ HCPCS (ALT 636 FOR OP/ED): Mod: SE | Performed by: RADIOLOGY

## 2024-11-01 PROCEDURE — 99152 MOD SED SAME PHYS/QHP 5/>YRS: CPT

## 2024-11-01 PROCEDURE — 97161 PT EVAL LOW COMPLEX 20 MIN: CPT | Mod: GP

## 2024-11-01 PROCEDURE — 82330 ASSAY OF CALCIUM: CPT

## 2024-11-01 PROCEDURE — 85027 COMPLETE CBC AUTOMATED: CPT

## 2024-11-01 PROCEDURE — 2500000002 HC RX 250 W HCPCS SELF ADMINISTERED DRUGS (ALT 637 FOR MEDICARE OP, ALT 636 FOR OP/ED)

## 2024-11-01 PROCEDURE — 36512 APHERESIS RBC: CPT

## 2024-11-01 PROCEDURE — 85610 PROTHROMBIN TIME: CPT

## 2024-11-01 PROCEDURE — 83735 ASSAY OF MAGNESIUM: CPT

## 2024-11-01 PROCEDURE — 2500000004 HC RX 250 GENERAL PHARMACY W/ HCPCS (ALT 636 FOR OP/ED)

## 2024-11-01 PROCEDURE — 99152 MOD SED SAME PHYS/QHP 5/>YRS: CPT | Performed by: RADIOLOGY

## 2024-11-01 PROCEDURE — 85045 AUTOMATED RETICULOCYTE COUNT: CPT

## 2024-11-01 PROCEDURE — 76937 US GUIDE VASCULAR ACCESS: CPT | Performed by: RADIOLOGY

## 2024-11-01 PROCEDURE — 83615 LACTATE (LD) (LDH) ENZYME: CPT

## 2024-11-01 PROCEDURE — 02HV33Z INSERTION OF INFUSION DEVICE INTO SUPERIOR VENA CAVA, PERCUTANEOUS APPROACH: ICD-10-PCS | Performed by: HOSPITALIST

## 2024-11-01 RX ORDER — POTASSIUM CHLORIDE 20 MEQ/1
40 TABLET, EXTENDED RELEASE ORAL ONCE
Status: COMPLETED | OUTPATIENT
Start: 2024-11-01 | End: 2024-11-01

## 2024-11-01 RX ORDER — WARFARIN 4 MG/1
4 TABLET ORAL ONCE
Status: COMPLETED | OUTPATIENT
Start: 2024-11-01 | End: 2024-11-01

## 2024-11-01 RX ORDER — CALCIUM CARBONATE 160(400)MG
1 TABLET,CHEWABLE ORAL ONCE
Qty: 1 EACH | Refills: 0 | Status: SHIPPED | OUTPATIENT
Start: 2024-11-01 | End: 2024-11-01

## 2024-11-01 RX ORDER — FENTANYL CITRATE 50 UG/ML
INJECTION, SOLUTION INTRAMUSCULAR; INTRAVENOUS
Status: COMPLETED | OUTPATIENT
Start: 2024-11-01 | End: 2024-11-01

## 2024-11-01 RX ORDER — TRAZODONE HYDROCHLORIDE 50 MG/1
50 TABLET ORAL NIGHTLY PRN
Qty: 30 TABLET | Refills: 0 | Status: SHIPPED | OUTPATIENT
Start: 2024-11-01 | End: 2024-12-01

## 2024-11-01 RX ORDER — MIDAZOLAM HYDROCHLORIDE 1 MG/ML
INJECTION INTRAMUSCULAR; INTRAVENOUS
Status: COMPLETED | OUTPATIENT
Start: 2024-11-01 | End: 2024-11-01

## 2024-11-01 RX ORDER — DIPHENHYDRAMINE HCL 25 MG
25 CAPSULE ORAL ONCE
Status: COMPLETED | OUTPATIENT
Start: 2024-11-01 | End: 2024-11-01

## 2024-11-01 RX ORDER — CALCIUM CARBONATE 200(500)MG
500 TABLET,CHEWABLE ORAL 4 TIMES DAILY PRN
Status: DISPENSED | OUTPATIENT
Start: 2024-11-01

## 2024-11-01 RX ORDER — CALCIUM GLUCONATE 20 MG/ML
1 INJECTION, SOLUTION INTRAVENOUS EVERY 4 HOURS
Status: DISCONTINUED | OUTPATIENT
Start: 2024-11-01 | End: 2024-11-01

## 2024-11-01 RX ORDER — CALCIUM CARBONATE 200(500)MG
1500 TABLET,CHEWABLE ORAL EVERY 5 MIN PRN
Status: DISCONTINUED | OUTPATIENT
Start: 2024-11-01 | End: 2024-11-01 | Stop reason: HOSPADM

## 2024-11-01 RX ORDER — FOLIC ACID 1 MG/1
1 TABLET ORAL DAILY
Qty: 30 TABLET | Refills: 0 | Status: SHIPPED | OUTPATIENT
Start: 2024-11-01 | End: 2024-12-01

## 2024-11-01 RX ORDER — METOPROLOL TARTRATE 25 MG/1
12.5 TABLET, FILM COATED ORAL 2 TIMES DAILY
Status: ON HOLD | COMMUNITY

## 2024-11-01 RX ORDER — CALCIUM CARBONATE 200(500)MG
500 TABLET,CHEWABLE ORAL DAILY
Status: DISCONTINUED | OUTPATIENT
Start: 2024-11-02 | End: 2024-11-01

## 2024-11-01 RX ORDER — OXYCODONE HYDROCHLORIDE 10 MG/1
10 TABLET ORAL EVERY 4 HOURS PRN
Qty: 75 TABLET | Refills: 0 | Status: SHIPPED | OUTPATIENT
Start: 2024-11-01

## 2024-11-01 RX ORDER — ONDANSETRON 4 MG/1
4 TABLET, FILM COATED ORAL EVERY 8 HOURS PRN
Qty: 20 TABLET | Refills: 2 | Status: SHIPPED | OUTPATIENT
Start: 2024-11-01 | End: 2024-12-01

## 2024-11-01 RX ORDER — HEPARIN SODIUM 1000 [USP'U]/ML
1600 INJECTION, SOLUTION INTRAVENOUS; SUBCUTANEOUS ONCE
Status: COMPLETED | OUTPATIENT
Start: 2024-11-01 | End: 2024-11-01

## 2024-11-01 RX ORDER — LANOLIN ALCOHOL/MO/W.PET/CERES
400 CREAM (GRAM) TOPICAL ONCE
Status: COMPLETED | OUTPATIENT
Start: 2024-11-01 | End: 2024-11-01

## 2024-11-01 RX ORDER — WARFARIN SODIUM 5 MG/1
5 TABLET ORAL EVERY EVENING
Qty: 30 TABLET | Refills: 0 | Status: SHIPPED | OUTPATIENT
Start: 2024-11-01 | End: 2024-12-01

## 2024-11-01 RX ORDER — DIPHENHYDRAMINE HYDROCHLORIDE 50 MG/ML
25 INJECTION INTRAMUSCULAR; INTRAVENOUS EVERY 5 MIN PRN
Status: DISCONTINUED | OUTPATIENT
Start: 2024-11-01 | End: 2024-11-01 | Stop reason: HOSPADM

## 2024-11-01 RX ORDER — TRAZODONE HYDROCHLORIDE 50 MG/1
50 TABLET ORAL NIGHTLY PRN
Status: ON HOLD | COMMUNITY
End: 2024-11-01

## 2024-11-01 RX ORDER — ACETAMINOPHEN 325 MG/1
650 TABLET ORAL ONCE
Status: COMPLETED | OUTPATIENT
Start: 2024-11-01 | End: 2024-11-01

## 2024-11-01 RX ORDER — MAGNESIUM SULFATE HEPTAHYDRATE 40 MG/ML
2 INJECTION, SOLUTION INTRAVENOUS ONCE
Status: DISCONTINUED | OUTPATIENT
Start: 2024-11-01 | End: 2024-11-01

## 2024-11-01 ASSESSMENT — PAIN SCALES - GENERAL
PAINLEVEL_OUTOF10: 0 - NO PAIN
PAINLEVEL_OUTOF10: 7
PAINLEVEL_OUTOF10: 0 - NO PAIN
PAINLEVEL_OUTOF10: 7
PAINLEVEL_OUTOF10: 6
PAINLEVEL_OUTOF10: 8
PAINLEVEL_OUTOF10: 8
PAINLEVEL_OUTOF10: 7
PAINLEVEL_OUTOF10: 8
PAINLEVEL_OUTOF10: 8
PAINLEVEL_OUTOF10: 0 - NO PAIN
PAINLEVEL_OUTOF10: 6
PAINLEVEL_OUTOF10: 8
PAINLEVEL_OUTOF10: 0 - NO PAIN
PAINLEVEL_OUTOF10: 7
PAINLEVEL_OUTOF10: 5 - MODERATE PAIN
PAINLEVEL_OUTOF10: 8

## 2024-11-01 ASSESSMENT — ACTIVITIES OF DAILY LIVING (ADL)
ADL_ASSISTANCE: INDEPENDENT
EFFECT OF PAIN ON DAILY ACTIVITIES: DECREASED MOBILITY
BATHING_ASSISTANCE: STAND BY
ADL_ASSISTANCE: INDEPENDENT

## 2024-11-01 ASSESSMENT — PAIN - FUNCTIONAL ASSESSMENT

## 2024-11-01 ASSESSMENT — COGNITIVE AND FUNCTIONAL STATUS - GENERAL
DRESSING REGULAR LOWER BODY CLOTHING: A LITTLE
CLIMB 3 TO 5 STEPS WITH RAILING: A LITTLE
PERSONAL GROOMING: A LITTLE
HELP NEEDED FOR BATHING: A LITTLE
STANDING UP FROM CHAIR USING ARMS: A LITTLE
TURNING FROM BACK TO SIDE WHILE IN FLAT BAD: A LITTLE
DRESSING REGULAR LOWER BODY CLOTHING: A LITTLE
MOBILITY SCORE: 23
DAILY ACTIVITIY SCORE: 19
DRESSING REGULAR LOWER BODY CLOTHING: A LITTLE
DAILY ACTIVITIY SCORE: 22
CLIMB 3 TO 5 STEPS WITH RAILING: A LITTLE
MOBILITY SCORE: 23
DAILY ACTIVITIY SCORE: 22
HELP NEEDED FOR BATHING: A LITTLE
WALKING IN HOSPITAL ROOM: A LITTLE
DRESSING REGULAR UPPER BODY CLOTHING: A LITTLE
MOVING TO AND FROM BED TO CHAIR: A LITTLE
HELP NEEDED FOR BATHING: A LITTLE
CLIMB 3 TO 5 STEPS WITH RAILING: A LITTLE
MOVING FROM LYING ON BACK TO SITTING ON SIDE OF FLAT BED WITH BEDRAILS: A LITTLE
TOILETING: A LITTLE
MOBILITY SCORE: 18

## 2024-11-01 ASSESSMENT — PAIN DESCRIPTION - DESCRIPTORS: DESCRIPTORS: ACHING

## 2024-11-01 NOTE — POST-PROCEDURE NOTE
This is a 54 y.o. 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     I saw and evaluated the patient during the RCE.  The patient was resting in bed.  Vascular access functioned well.    WBC   Date/Time Value Ref Range Status   11/01/2024 06:40 AM 11.4 (H) 4.4 - 11.3 x10*3/uL Final     Hemoglobin   Date/Time Value Ref Range Status   11/01/2024 06:40 AM 9.7 (L) 12.0 - 16.0 g/dL Final     Hematocrit   Date/Time Value Ref Range Status   11/01/2024 06:40 AM 29.6 (L) 36.0 - 46.0 % Final     Platelets   Date/Time Value Ref Range Status   11/01/2024 06:40  150 - 450 x10*3/uL Final        POCT Calcium, Ionized   Date/Time Value Ref Range Status   10/31/2024 03:29 PM 0.98 (L) 1.1 - 1.33 mmol/L Final     Comment:     The performance characteristics of ionized calcium tested  in heparinized plasma or serum have been validated by the  individual  laboratory site where testing is performed.   Testing on heparinized plasma or serum is not approved by   the FDA; however, such approval is not necessary.        Hemoglobin S   Date/Time Value Ref Range Status   10/31/2024 03:29 PM 24.3 (H) <=0.0 % Preliminary        Ferritin   Date/Time Value Ref Range Status   10/30/2024 01:40 PM 94 8 - 150 ng/mL Final        Pre-procedure vital signs at 1305:  T: 36.2 C, HR: 68, RR: 16, BP: 101/63  Pulse oximetry: 96% on room air    Post-procedure vital signs at 1447:  T: 36.6 C, HR: 68, RR: 18, BP: 125/72  Pulse oximetry: 94% on room air    Outcome: RCE completed.  Adverse Reaction: No    Assessment and Plan:  54 y.o. 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    Draw post-procedure labs  Vascular access to be managed by the clinical team.  No further RCE planned for this admission

## 2024-11-01 NOTE — CARE PLAN
The clinical goals for the shift include Pt will remain HDS and VSS throughout shift 11/1/24 by 0700.      Problem: Pain - Adult  Goal: Verbalizes/displays adequate comfort level or baseline comfort level  Outcome: Progressing     Problem: Safety - Adult  Goal: Free from fall injury  Outcome: Progressing     Problem: Discharge Planning  Goal: Discharge to home or other facility with appropriate resources  Outcome: Progressing     Problem: Chronic Conditions and Co-morbidities  Goal: Patient's chronic conditions and co-morbidity symptoms are monitored and maintained or improved  Outcome: Progressing     Problem: Fall/Injury  Goal: Not fall by end of shift  Outcome: Progressing  Goal: Be free from injury by end of the shift  Outcome: Progressing  Goal: Verbalize understanding of personal risk factors for fall in the hospital  Outcome: Progressing  Goal: Verbalize understanding of risk factor reduction measures to prevent injury from fall in the home  Outcome: Progressing  Goal: Use assistive devices by end of the shift  Outcome: Progressing  Goal: Pace activities to prevent fatigue by end of the shift  Outcome: Progressing

## 2024-11-01 NOTE — POST-PROCEDURE NOTE
Senait [place] [id number]       Procedure type: Red cell Exchange    Replacement Fluids:  3units of PRBC used for procedure (RBCX)     Procedure Completed Without complications.    Attending notified of completion status. See flow sheet(s) for additional details.  Post-Vitals listed below.    Post-procedure vitals@1447:  BP: 125/72  Temp: 36.6 °C (97.9 °F)  Temp Source: Temporal [Temporal]  Heart Rate: 68  Resp: 18  SpO2: 94 %        Lizbeth Babb RN

## 2024-11-01 NOTE — PROGRESS NOTES
Physical Therapy    Physical Therapy Evaluation    Patient Name: Senait Narvaez  MRN: [id number]  Department: [facility]  Room: [address] [place] [address]  Today's Date: 11/1/2024   Time Calculation  Start Time: 1159  Stop Time: 1218  Time Calculation (min): 19 min    Assessment/Plan   PT Assessment  PT Assessment Results: Decreased endurance, Impaired balance, Decreased mobility, Pain  Rehab Prognosis: Good  Barriers to Discharge: none  Evaluation/Treatment Tolerance: Patient limited by fatigue, Patient limited by pain  Medical Staff Made Aware: Yes  Strengths: Attitude of self, Coping skills  Barriers to Participation: Comorbidities  End of Session Communication: Bedside nurse  Assessment Comment: The pt demonstrated a slightly unsteady gait using a standard walker, but safe and appropriate for low intensity level therapy after d/c.  End of Session Patient Position: Bed, 3 rail up, Alarm off, not on at start of session  IP OR SWING BED PT PLAN  Inpatient or Swing Bed: Inpatient  PT Plan  Treatment/Interventions: Bed mobility, Transfer training, Gait training, Balance training, Strengthening, Endurance training, Therapeutic exercise, Therapeutic activity, Home exercise program  PT Plan: Ongoing PT  PT Frequency: 3 times per week  PT Discharge Recommendations: Low intensity level of continued care  Equipment Recommended upon Discharge: Other (comment) (Rollator)  PT Recommended Transfer Status: Stand by assist  PT - OK to Discharge: Yes    Subjective   General Visit Information:  General  Reason for Referral: Scheduled red cell exchange transfusion requiring a CVC placement with IR and history of arrhythmia and need for tele  Past Medical History Relevant to Rehab: Multisystem organ failure requiring intubation, NSTEMI, septic shock), 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  Home Living:  Home Living  Type of Home: Apartment  Lives With: Alone  Home Adaptive Equipment: Walker rolling or standard  Home Layout: One level  Home Access: Elevator  Bathroom Shower/Tub: Tub/shower unit  Bathroom Toilet: Handicapped height  Bathroom Equipment: Grab bars in shower, Shower chair with back  Prior Level of Function:  Prior Function Per Pt/Caregiver Report  Level of [place]: Independent with ADLs and functional transfers, Independent with homemaking with ambulation  Receives Help From: Family, Friends  ADL Assistance: Independent  Homemaking Assistance: Independent  Ambulatory Assistance: Independent  Vocational: On disability  Leisure: Socialize with family and friends, cook, read, and watch movies  Hand Dominance: Right  Prior Function Comments: Pt was independent with all mobility without an assistive device in the household and in the community.  Precautions:  Precautions  Hearing/Visual Limitations: Hearing and vision WFL with glasses.  Medical Precautions: Fall precautions  Precautions Comment: Pt in compliance with precaution throughout PT session.     Vital Signs (Past 2hrs)        Date/Time Vitals Session Patient Position Pulse Resp SpO2 BP MAP (mmHg)    11/01/24 1159 During PT  Sitting  83  --  95 %  --  --                   Vital Signs Comment: vitals stable     Objective   Pain:  Pain Assessment  Pain Assessment: 0-10  0-10 (Numeric) Pain Score: 5 - Moderate pain  Pain Type: Acute pain  Pain Location: Ankle  Pain Orientation: Left  Pain Radiating Towards: left knee  Pain Descriptors: Aching  Pain Frequency: Constant/continuous  Pain Onset: Ongoing  Clinical Progression: Not changed  Effect of Pain on Daily Activities: Decreased mobility  Patient's Stated Pain Goal: No pain  Pain Interventions:  Ambulation/increased activity, Therapeutic presence  Response to Interventions: Pain stable  Cognition:  Cognition  Overall Cognitive Status: Within Functional Limits  Orientation Level: Oriented X4  Following Commands: Follows all commands and directions without difficulty    General Assessments:  General Observation  General Observation: The pt presented with a slightly unsteady gait using a standard walker.     Activity Tolerance  Endurance: Decreased tolerance for upright activites    Sensation  Light Touch: No apparent deficits    Strength  Strength Comments: WFL  Perception  Inattention/Neglect: Appears intact    Coordination  Movements are Fluid and Coordinated: Yes  Coordination Comment: WFL    Postural Control  Postural Control: Within Functional Limits  Posture Comment: Pt presented with good sitting and standing posture using a standard walker.    Static Sitting Balance  Static Sitting-Balance Support: Bilateral upper extremity supported, Feet supported  Static Sitting-Level of Assistance: Independent  Static Sitting-Comment/Number of Minutes: Sitting EOB  Dynamic Sitting Balance  Dynamic Sitting-Balance Support: Bilateral upper extremity supported, Feet supported  Dynamic Sitting-Level of Assistance: Independent  Dynamic Sitting-Comments: Sitting EOB    Static Standing Balance  Static Standing-Balance Support: Bilateral upper extremity supported  Static Standing-Level of Assistance: Close supervision  Static Standing-Comment/Number of Minutes: using a standard walker  Dynamic Standing Balance  Dynamic Standing-Balance Support: Bilateral upper extremity supported  Dynamic Standing-Level of Assistance: Close supervision  Dynamic Standing-Comments: using a standard walker  Functional Assessments:  Bed Mobility  Bed Mobility: Yes  Bed Mobility 1  Bed Mobility 1: Supine to sitting  Level of Assistance 1: Distant supervision  Bed Mobility Comments 1: HOB slightly elevated  Bed Mobility 2  Bed Mobility  2:  Sitting to supine  Level of Assistance 2: Distant supervision  Bed Mobility Comments 2: HOB slightly elevated    Transfers  Transfer: Yes  Transfer 1  Transfer From 1: Sit to  Transfer to 1: Stand  Transfer Device 1: Walker  Transfer Level of Assistance 1: Close supervision  Transfers 2  Transfer From 2: Stand to  Transfer to 2: Sit  Transfer Device 2: Walker  Transfer Level of Assistance 2: Close supervision    Ambulation/Gait Training  Ambulation/Gait Training Performed: Yes  Ambulation/Gait Training 1  Surface 1: Level tile  Device 1: Standard walker  Assistance 1: Close supervision  Quality of Gait 1: Decreased step length, Antalgic (slightly unsteady, decreased efrain, decreased endurance)  Comments/Distance (ft) 1: 25ft    Stairs  Stairs: No  Extremity/Trunk Assessments:  Cervical Spine   Cervical Spine: Within Functional Limits  Lumbar Spine   Lumbar Spine : Within Functional Limits    RUE   RUE : Within Functional Limits  LUE   LUE: Within Functional Limits  RLE   RLE : Within Functional Limits  LLE   LLE : Within Functional Limits  Outcome Measures:  [facility] Basic Mobility  Turning from your back to your side while in a flat bed without using bedrails: A little  Moving from lying on your back to sitting on the side of a flat bed without using bedrails: A little  Moving to and from bed to chair (including a wheelchair): A little  Standing up from a chair using your arms (e.g. wheelchair or bedside chair): A little  To walk in hospital room: A little  Climbing 3-5 steps with railing: A little  Basic Mobility - Total Score: 18    Encounter Problems       Encounter Problems (Active)       Balance       STG - Maintains independent dynamic standing balance with upper extremity support using a standard walker. (Progressing)       Start:  11/01/24    Expected End:  11/15/24            STG - Maintains independent static standing balance with upper extremity support using a standard walker. (Progressing)       Start:   11/01/24    Expected End:  11/15/24               Mobility       STG - Patient will ambulate 125ft, independently using a standard walker. (Progressing)       Start:  11/01/24    Expected End:  11/15/24               PT Transfers       STG - Transfer from bed to chair, independently using a standard walker. (Progressing)       Start:  11/01/24    Expected End:  11/15/24            STG - Patient to transfer to and from sit to supine, independently. (Progressing)       Start:  11/01/24    Expected End:  11/15/24            STG - Patient will transfer sit to and from stand, independently using a standard walker. (Progressing)       Start:  11/01/24    Expected End:  11/15/24                   Education Documentation  Home Exercise Program, taught by Bob Joseph PT at 11/1/2024  1:06 PM.  Learner: Patient  Readiness: Acceptance  Method: Explanation, Demonstration  Response: Verbalizes Understanding, Demonstrated Understanding  Comment: The pt demonstrated safe mobility with learned techniques.    Body Mechanics, taught by Bob Joseph PT at 11/1/2024  1:06 PM.  Learner: Patient  Readiness: Acceptance  Method: Explanation, Demonstration  Response: Verbalizes Understanding, Demonstrated Understanding  Comment: The pt demonstrated safe mobility with learned techniques.    Mobility Training, taught by Bob Joseph PT at 11/1/2024  1:06 PM.  Learner: Patient  Readiness: Acceptance  Method: Explanation, Demonstration  Response: Verbalizes Understanding, Demonstrated Understanding  Comment: The pt demonstrated safe mobility with learned techniques.    Education Comments  No comments found.

## 2024-11-01 NOTE — PROGRESS NOTES
Occupational Therapy    Occupational Therapy    Evaluation    Patient Name: Senait Narvaez  MRN: [id number]  Today's Date: 11/1/2024  Room: [address] [place] [address]A  Time Calculation  Start Time: 1230  Stop Time: 1241  Time Calculation (min): 11 min    Assessment  IP OT Assessment  OT Assessment: Patient is a 54 year old female admitted to hospital with sickle cell disease pain for planned red cell exchange transfusion. Patient presents with impaired ADL, functional mobilty, functional transfer, bed mobility and would benefit from skilled OT services while in hospital and post discharge. Patient participated in OT evaluation this date and performed ADL and mobility with primarily SBA level of assistance.  Prognosis: Good  Barriers to Discharge: None  Evaluation/Treatment Tolerance:  (evaluation tolerated well)  Medical Staff Made Aware: Yes  End of Session Communication: Bedside nurse  End of Session Patient Position: Bed, 3 rail up, Alarm off, not on at start of session  Plan:  Inpatient Plan  Treatment Interventions: ADL retraining, Functional transfer training, Endurance training, Patient/family training, Equipment evaluation/education, Compensatory technique education  OT Frequency: 3 times per week  OT Discharge Recommendations: Low intensity level of continued care  OT Assessment  Prognosis: Good  Barriers to Discharge: None  Evaluation/Treatment Tolerance:  (evaluation tolerated well)  Medical Staff Made Aware: Yes  Strengths: Ability to acquire knowledge, Attitude of self  Barriers to Participation: Comorbidities    Subjective   Current Problem:  1. Sickle cell disease without crisis (Multi)        2. Sickle cell crisis (Multi)  folic acid (Folvite) 1 mg tablet      3. 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), CAN and nocturnal hypoxia, chronic constipation   Precautions:  Medical Precautions: Fall precautions, Oxygen therapy device and L/min  Vital Signs:  Vital Signs (Past 2hrs)        Date/Time Vitals Session Patient Position Pulse Resp SpO2 BP MAP (mmHg)    11/01/24 1159 During PT  Sitting  83  --  95 %  --  --                   Pain:  Pain Assessment  Pain Assessment: 0-10  0-10 (Numeric) Pain Score: 7  Pain Type: Acute pain  Pain Location:  (patient reported overall pain)  Lines/Tubes/Drains:  CVC 11/01/24 Triple lumen Non-tunneled Right Femoral (Active)   Number of days: 0       External Urinary Catheter Female (Active)   Number of days: 25         Objective   Cognition:  Overall Cognitive Status: Within Functional Limits  Orientation Level: Oriented X4  Attention: Within Functional Limits           Home Living:  Type of Home: Apartment  Lives With: Alone  Home Adaptive Equipment: None  Home Layout: One level  Home Access: Level entry  Bathroom Shower/Tub: Tub/shower unit  Bathroom Toilet: Handicapped height  Bathroom Equipment: Shower chair with back, Grab bars in shower   Prior Function:  Level of [place]: Independent with ADLs and functional transfers, Independent with homemaking with ambulation  ADL Assistance: Independent  Homemaking Assistance: Independent  Ambulatory Assistance: Independent  Vocational: Retired  Leisure: enjoys time with grandchild, friends, enjoys cooking, watching movies  Hand Dominance: Right       ADL:  Eating Assistance: Independent  Eating Deficit: Setup  Grooming Assistance: Stand by  Grooming Deficit: Setup  Bathing Assistance: Stand by  Bathing Deficit:  Setup  UE Dressing Assistance: Minimal  UE Dressing Deficit: Setup  LE Dressing Assistance: Stand by (for socks)  LE Dressing Deficit: Setup  Toileting Assistance with Device: Stand by  Toileting Deficit: Setup  Activity Tolerance:  Endurance: Tolerates less than 10 min exercise, no significant change in vital signs  Balance:  Dynamic Sitting Balance  Dynamic Sitting-Level of Assistance: Close supervision  Dynamic Standing Balance  Dynamic Standing-Level of Assistance: Close supervision  Static Sitting Balance  Static Sitting-Level of Assistance: Distant supervision  Static Standing Balance  Static Standing-Level of Assistance: Close supervision  Bed Mobility/Transfers: Bed Mobility  Bed Mobility: Yes  Bed Mobility 1  Bed Mobility 1: Supine to sitting, Sitting to supine  Level of Assistance 1: Close supervision  Functional Mobility  Functional Mobility Performed: Yes  Functional Mobility 1  Surface 1: Level tile  Device 1:  (walker)  Assistance 1: Close supervision   and Transfers  Transfer: Yes  Transfer 1  Transfer From 1: Sit to, Stand to  Transfer to 1: Stand, Sit  Transfer Device 1: Walker  Transfer Level of Assistance 1: Close supervision       Vision: Vision - Basic Assessment  Current Vision: Wears glasses all the time       Coordination:  Movements are Fluid and Coordinated: Yes   Hand Function:  Hand Function  Gross Grasp: Functional  Extremities: RUE   RUE : Within Functional Limits, LUE   LUE: Within Functional Limits, RLE   RLE : Within Functional Limits, and LLE   LLE : Within Functional Limits    Outcome Measures: [facility] Daily Activity  Putting on and taking off regular lower body clothing: A little  Bathing (including washing, rinsing, drying): A little  Putting on and taking off regular upper body clothing: A little  Toileting, which includes using toilet, bedpan or urinal: A little  Taking care of personal grooming such as brushing teeth: A little  Eating Meals: None  Daily Activity - Total Score:  19         ,     OT Adult Other Outcome Measures  4AT: 0- negative    Education Documentation  Precautions, taught by Araceli Krause OT at 11/1/2024  1:08 PM.  Learner: Patient  Readiness: Acceptance  Method: Explanation  Response: Verbalizes Understanding    Education Comments  No comments found.        Goals:     Encounter Problems       Encounter Problems (Active)       ADLs       Patient will perform UB and LB bathing seated EOB with modified independent level of assistance and long-handled sponge. (Progressing)       Start:  11/01/24    Expected End:  11/15/24            Patient with complete upper body dressing with independent level of assistance donning and doffing all UE clothes with no adaptive equipment while edge of bed  (Progressing)       Start:  11/01/24    Expected End:  11/15/24            Patient with complete lower body dressing with modified independent level of assistance donning and doffing all LE clothes  with PRN adaptive equipment while edge of bed  (Progressing)       Start:  11/01/24    Expected End:  11/15/24            Patient will complete daily grooming tasks  with independent level of assistance and no adaptive equipment while edge of bed . (Progressing)       Start:  11/01/24    Expected End:  11/15/24            Patient will complete toileting including hygiene clothing management/hygiene with modified independent level of assistance and grab bars. (Progressing)       Start:  11/01/24    Expected End:  11/15/24               MOBILITY       Patient will perform Functional mobility mod  Household distances with modified independent level of assistance and front wheeled walker in order to improve safety and functional mobility. (Progressing)       Start:  11/01/24    Expected End:  11/15/24               TRANSFERS       Patient will perform bed mobility modified independent level of assistance and bed rails in order to improve safety and independence with mobility (Progressing)        Start:  11/01/24    Expected End:  11/15/24            Patient will complete functional transfer to all surfaces with front wheeled walker with modified independent level of assistance. (Progressing)       Start:  11/01/24    Expected End:  11/15/24                   11/01/24 at 1:09 PM   Araceli Krause OT   Rehab Office: [phone]

## 2024-11-01 NOTE — PROGRESS NOTES
Senait Narvaez is a 54 y.o. female on day 1 of admission presenting with Sickle cell anemia with pain (Multi).    Subjective   NAEON. Patient was back in her room after the procedure this morning. Says it went well, endorsing some mild pain in this area. No CP/SOB. Understands she is scheduled for her exchange transfusion at 1 PM today. Excited to work with PT/OT as well.       Objective     Last Recorded Vitals  /67 (BP Location: Left arm, Patient Position: Lying)   Pulse 76   Temp 36.7 °C (98.1 °F) (Temporal)   Resp 16   Wt 108 kg (238 lb 12.8 oz)   SpO2 95%   Intake/Output last 3 Shifts:    Intake/Output Summary (Last 24 hours) at 11/1/2024 0835  Last data filed at 11/1/2024 0208  Gross per 24 hour   Intake 300.25 ml   Output --   Net 300.25 ml       Admission Weight  Weight: 108 kg (238 lb 12.8 oz) (10/31/24 1100)    Daily Weight  10/31/24 : 108 kg (238 lb 12.8 oz)    Image Results  BI mammo bilateral diagnostic tomosynthesis, BI US breast limited left  Narrative: Interpreted By:  Debbie Magallon and Liller Gregory   STUDY:  BI MAMMO BILATERAL DIAGNOSTIC TOMOSYNTHESIS; BI US BREAST LIMITED  LEFT;  10/16/2024 10:21 am; 10/16/2024 10:59 am      ACCESSION NUMBER(S):  [id number]; [id number]      ORDERING CLINICIAN:  ANGELA MEJIA      INDICATION:  Patient presents with skin hardening and discoloration of the left  breast. History of sickle cell disease      COMPARISON:  Mammogram 08/16/2023, 01/18/2021, 12/30/2019, breast ultrasound  11/27/2023.      FINDINGS:  MAMMOGRAPHY: 2D and tomosynthesis images were reviewed at 1 mm slice  thickness.      Density:  There are scattered areas of fibroglandular density.      There is marked asymmetric diffuse left breast skin thickening and  trabecular thickening. No suspicious masses or calcifications are  identified in either breast. A slightly prominent lymph node is again  seen in the lower left axilla, similar compared to previous  mammograms.       ULTRASOUND: Targeted ultrasound was performed of the entire left  breast by a registered sonographer with elastography. There is  diffuse skin thickening of the left breast measuring up to 0.7 cm and  diffuse parenchymal edema, correlating with mammographic findings. No  focal abnormality is identified on the ultrasound examination.      Impression: 1. Marked left breast diffuse edema, suggestive of vaso-occlusive  etiology. Clinical correlation and management till resolution is  recommended.  2. No mammographic or targeted sonographic evidence of malignancy.      BI-RADS CATEGORY:  BI-RADS Category:  2 Benign.  Recommendation:  Clinical follow-up.  Recommended Date:  Immediate.  Laterality:  Left.      For any future breast imaging appointments, please call [phone]).      I personally reviewed the images/study and I agree with the findings  as stated above by resident physician, Dr. Quan Bergman.      MACRO:  None      Signed by: Debbie Magallon 10/16/2024 11:41 AM  Dictation workstation:   [id number]    PHYSICAL EXAM:  General: awake, alert, conversant, appears stated age  HEENT: pupils equal and round, no scleral icterus or conjunctivitis  Skin: no suspect lesions or rashes noted on visible skin  Chest: ctab, normal respiratory effort, not on supplemental oxygen  Cardiac: regular rate and rhythm, normal s1, s2, no M/R/G, no JVD  Abdomen: soft, ND, NT, no involuntary guarding  EXT: RLE wrapped, LLE with scratch marks but without swelling. +R groin non-tunneled CVC, no evidence of hematoma, erythema or swelling  MSK: no focal joint swelling noted  Neuro: AOx4, moving all limbs spontaneously, follows commands  Psych: coherent thought process, appropriate mood and affect    Relevant Results  Scheduled medications  ascorbic acid, 500 mg, oral, Daily  colchicine, 0.6 mg, oral, BID  folic acid, 1 mg, oral, Daily  [START ON 11/2/2024] furosemide, 20 mg, oral, Every other day  metoprolol tartrate,  12.5 mg, oral, BID  sennosides-docusate sodium, 2 tablet, oral, q12h  triamcinolone, , Topical, BID  white petrolatum, 2 Application, Topical, BID      Continuous medications     PRN medications  PRN medications: acetaminophen, albuterol, cyclobenzaprine, fluticasone, ondansetron, oxyCODONE, oxyCODONE, polyethylene glycol    Results for orders placed or performed during the hospital encounter of 10/31/24 (from the past 24 hours)   CBC and Auto Differential   Result Value Ref Range    WBC 10.9 4.4 - 11.3 x10*3/uL    nRBC 1.0 (H) 0.0 - 0.0 /100 WBCs    RBC 3.57 (L) 4.00 - 5.20 x10*6/uL    Hemoglobin 10.2 (L) 12.0 - 16.0 g/dL    Hematocrit 30.8 (L) 36.0 - 46.0 %    MCV 86 80 - 100 fL    MCH 28.6 26.0 - 34.0 pg    MCHC 33.1 32.0 - 36.0 g/dL    RDW 17.8 (H) 11.5 - 14.5 %    Platelets 374 150 - 450 x10*3/uL    Neutrophils % 68.8 40.0 - 80.0 %    Immature Granulocytes %, Automated 0.8 0.0 - 0.9 %    Lymphocytes % 21.6 13.0 - 44.0 %    Monocytes % 6.6 2.0 - 10.0 %    Eosinophils % 1.9 0.0 - 6.0 %    Basophils % 0.3 0.0 - 2.0 %    Neutrophils Absolute 7.46 1.20 - 7.70 x10*3/uL    Immature Granulocytes Absolute, Automated 0.09 0.00 - 0.70 x10*3/uL    Lymphocytes Absolute 2.35 1.20 - 4.80 x10*3/uL    Monocytes Absolute 0.72 0.10 - 1.00 x10*3/uL    Eosinophils Absolute 0.21 0.00 - 0.70 x10*3/uL    Basophils Absolute 0.03 0.00 - 0.10 x10*3/uL   Hepatic function panel   Result Value Ref Range    Albumin 2.9 (L) 3.4 - 5.0 g/dL    Bilirubin, Total 1.9 (H) 0.0 - 1.2 mg/dL    Bilirubin, Direct 0.4 (H) 0.0 - 0.3 mg/dL    Alkaline Phosphatase 168 (H) 33 - 110 U/L    ALT 19 7 - 45 U/L    AST 43 (H) 9 - 39 U/L    Total Protein 6.9 6.4 - 8.2 g/dL   Magnesium   Result Value Ref Range    Magnesium 1.91 1.60 - 2.40 mg/dL   Lactate dehydrogenase   Result Value Ref Range     (H) 84 - 246 U/L   Reticulocytes   Result Value Ref Range    Retic % 7.8 (H) 0.5 - 2.0 %    Retic Absolute 0.279 (H) 0.018 - 0.083 x10*6/uL    Reticulocyte  Hemoglobin 30 28 - 38 pg    Immature Retic fraction 27.3 (H) <=16.0 %   Coagulation Screen   Result Value Ref Range    Protime 45.4 (H) 9.8 - 12.8 seconds    INR 4.0 (H) 0.9 - 1.1    aPTT 48 (H) 27 - 38 seconds   Phosphorus   Result Value Ref Range    Phosphorus 3.3 2.5 - 4.9 mg/dL   Basic Metabolic Panel   Result Value Ref Range    Glucose 69 (L) 74 - 99 mg/dL    Sodium 138 136 - 145 mmol/L    Potassium 3.7 3.5 - 5.3 mmol/L    Chloride 102 98 - 107 mmol/L    Bicarbonate 28 21 - 32 mmol/L    Anion Gap 12 10 - 20 mmol/L    Urea Nitrogen 8 6 - 23 mg/dL    Creatinine 0.96 0.50 - 1.05 mg/dL    eGFR 70 >60 mL/min/1.73m*2    Calcium 7.7 (L) 8.6 - 10.6 mg/dL   Prepare RBC: 3 Units   Result Value Ref Range    PRODUCT CODE F5543B14     Unit Number [id number]     Unit ABO B     Unit RH POS     XM INTEP COMP     Dispense Status XM     Blood Expiration Date 11/21/2024 11:59:00 PM EST     PRODUCT BLOOD TYPE 7300     UNIT VOLUME 350     PRODUCT CODE J8859G30     Unit Number [id number]     Unit ABO B     Unit RH POS     XM INTEP COMP     Dispense Status XM     Blood Expiration Date 11/22/2024 11:59:00 PM EST     PRODUCT BLOOD TYPE 7300     UNIT VOLUME 350     PRODUCT CODE E9302A37     Unit Number [id number]     Unit ABO B     Unit RH POS     XM INTEP COMP     Dispense Status XM     Blood Expiration Date 11/23/2024 11:59:00 PM EST     PRODUCT BLOOD TYPE 7300     UNIT VOLUME 350    Calcium, ionized   Result Value Ref Range    POCT Calcium, Ionized 0.98 (L) 1.1 - 1.33 mmol/L   CBC and Auto Differential   Result Value Ref Range    WBC 11.4 (H) 4.4 - 11.3 x10*3/uL    nRBC 0.7 (H) 0.0 - 0.0 /100 WBCs    RBC 3.36 (L) 4.00 - 5.20 x10*6/uL    Hemoglobin 9.7 (L) 12.0 - 16.0 g/dL    Hematocrit 29.6 (L) 36.0 - 46.0 %    MCV 88 80 - 100 fL    MCH 28.9 26.0 - 34.0 pg    MCHC 32.8 32.0 - 36.0 g/dL    RDW 18.9 (H) 11.5 - 14.5 %    Platelets 277 150 - 450 x10*3/uL    Neutrophils % 53.6 40.0 - 80.0 %    Immature Granulocytes %,  Automated 1.3 (H) 0.0 - 0.9 %    Lymphocytes % 29.8 13.0 - 44.0 %    Monocytes % 11.2 2.0 - 10.0 %    Eosinophils % 3.7 0.0 - 6.0 %    Basophils % 0.4 0.0 - 2.0 %    Neutrophils Absolute 6.09 1.20 - 7.70 x10*3/uL    Immature Granulocytes Absolute, Automated 0.15 0.00 - 0.70 x10*3/uL    Lymphocytes Absolute 3.39 1.20 - 4.80 x10*3/uL    Monocytes Absolute 1.28 (H) 0.10 - 1.00 x10*3/uL    Eosinophils Absolute 0.42 0.00 - 0.70 x10*3/uL    Basophils Absolute 0.05 0.00 - 0.10 x10*3/uL   Reticulocytes   Result Value Ref Range    Retic % 9.3 (H) 0.5 - 2.0 %    Retic Absolute 0.314 (H) 0.018 - 0.083 x10*6/uL    Reticulocyte Hemoglobin 29 28 - 38 pg    Immature Retic fraction 22.4 (H) <=16.0 %   Coagulation Screen   Result Value Ref Range    Protime 33.5 (H) 9.8 - 12.8 seconds    INR 2.9 (H) 0.9 - 1.1    aPTT 39 (H) 27 - 38 seconds     *Note: Due to a large number of results and/or encounters for the requested time period, some results have not been displayed. A complete set of results can be found in Results Review.     BI mammo bilateral diagnostic tomosynthesis    Result Date: 10/16/2024  Interpreted By:  Debbie Magallon and Liller Gregory STUDY: BI MAMMO BILATERAL DIAGNOSTIC TOMOSYNTHESIS; BI US BREAST LIMITED LEFT;  10/16/2024 10:21 am; 10/16/2024 10:59 am   ACCESSION NUMBER(S): [id number]; [id number]   ORDERING CLINICIAN: ANGELA MEJIA   INDICATION: Patient presents with skin hardening and discoloration of the left breast. History of sickle cell disease   COMPARISON: Mammogram 08/16/2023, 01/18/2021, 12/30/2019, breast ultrasound 11/27/2023.   FINDINGS: MAMMOGRAPHY: 2D and tomosynthesis images were reviewed at 1 mm slice thickness.   Density:  There are scattered areas of fibroglandular density.   There is marked asymmetric diffuse left breast skin thickening and trabecular thickening. No suspicious masses or calcifications are identified in either breast. A slightly prominent lymph node is again seen in  the lower left axilla, similar compared to previous mammograms.   ULTRASOUND: Targeted ultrasound was performed of the entire left breast by a registered sonographer with elastography. There is diffuse skin thickening of the left breast measuring up to 0.7 cm and diffuse parenchymal edema, correlating with mammographic findings. No focal abnormality is identified on the ultrasound examination.       1. Marked left breast diffuse edema, suggestive of vaso-occlusive etiology. Clinical correlation and management till resolution is recommended. 2. No mammographic or targeted sonographic evidence of malignancy.   BI-RADS CATEGORY: BI-RADS Category:  2 Benign. Recommendation:  Clinical follow-up. Recommended Date:  Immediate. Laterality:  Left.   For any future breast imaging appointments, please call [phone]).   I personally reviewed the images/study and I agree with the findings as stated above by resident physician, Dr. Quan Bergman.   MACRO: None   Signed by: Debbie Magallon 10/16/2024 11:41 AM Dictation workstation:   [id number]    BI US breast limited left    Result Date: 10/16/2024  Interpreted By:  Debbie Magallon and Liller Gregory STUDY: BI MAMMO BILATERAL DIAGNOSTIC TOMOSYNTHESIS; BI US BREAST LIMITED LEFT;  10/16/2024 10:21 am; 10/16/2024 10:59 am   ACCESSION NUMBER(S): [id number]; [id number]   ORDERING CLINICIAN: ANGELA MEJIA   INDICATION: Patient presents with skin hardening and discoloration of the left breast. History of sickle cell disease   COMPARISON: Mammogram 08/16/2023, 01/18/2021, 12/30/2019, breast ultrasound 11/27/2023.   FINDINGS: MAMMOGRAPHY: 2D and tomosynthesis images were reviewed at 1 mm slice thickness.   Density:  There are scattered areas of fibroglandular density.   There is marked asymmetric diffuse left breast skin thickening and trabecular thickening. No suspicious masses or calcifications are identified in either breast. A slightly prominent  lymph node is again seen in the lower left axilla, similar compared to previous mammograms.   ULTRASOUND: Targeted ultrasound was performed of the entire left breast by a registered sonographer with elastography. There is diffuse skin thickening of the left breast measuring up to 0.7 cm and diffuse parenchymal edema, correlating with mammographic findings. No focal abnormality is identified on the ultrasound examination.       1. Marked left breast diffuse edema, suggestive of vaso-occlusive etiology. Clinical correlation and management till resolution is recommended. 2. No mammographic or targeted sonographic evidence of malignancy.   BI-RADS CATEGORY: BI-RADS Category:  2 Benign. Recommendation:  Clinical follow-up. Recommended Date:  Immediate. Laterality:  Left.   For any future breast imaging appointments, please call [phone]).   I personally reviewed the images/study and I agree with the findings as stated above by resident physician, Dr. Quan Bergman.   MACRO: None   Signed by: Debbie Magallon 10/16/2024 11:41 AM Dictation workstation:   [id number]    CT ankle left with IV contrast    Result Date: 10/14/2024  Interpreted By:  Vivek Ji and Lawrence Austen STUDY: CT ANKLE LEFT W IV CONTRAST;  10/13/2024 4:00 pm   INDICATION: Signs/Symptoms:sickle cell, increased ankle pain.   COMPARISON: Radiographs 10/08/2024.   ACCESSION NUMBER(S): [id number]   ORDERING CLINICIAN: DARIN ANTONIO   TECHNIQUE: CT imaging of the  left ankle was obtained  with administration of intravenous contrast medium. Coronal and sagittal reformatted images were performed.   FINDINGS: OSSEOUS STRUCTURES: No acute fractures. Remote fracture deformity of the distal fibula metaphysis. Joint alignment is maintained. Mild subchondral sclerotic changes and joint space narrowing of the midfoot. There is mild sclerotic and subchondral cystic changes of the tibiotalar joint. No evidence of serpiginous sclerosis or  lucency to definitively suggest avascular necrosis.   SOFT TISSUES: Diffuse soft tissue edema of the distal lower extremity and ankle without focal fluid collection or abnormal enhancement. The visualized ligamentous and tendinous structures are intact.       1. Diffuse nonspecific edema of the distal lower extremity and ankle without focal fluid collection or abnormal enhancement. Findings may represent vascular congestion, and cellulitis is felt less likely with the lack of enhancement but is not entirely excluded. No acute osseous findings. 2. No osseous manifestations of avascular necrosis within the limitations of CT modality. 3. Mild degenerative changes of the mid and hindfoot.     MACRO: None   Signed by: Vivek Ji 10/14/2024 11:09 AM Dictation workstation:   [id number]    XR ankle left 3+ views    Result Date: 10/9/2024  Interpreted By:  Srinivasa Akhtar, STUDY: XR ANKLE LEFT 3+ VIEWS; ;  10/8/2024 6:50 pm   INDICATION: Signs/Symptoms:sickle cell, increased left ankle pain.     COMPARISON: None.   ACCESSION NUMBER(S): [id number]   ORDERING CLINICIAN: DARIN ANTONIO   FINDINGS: Three views of the left ankle.   No acute fracture. Joint alignment is maintained. Soft tissue edema of the ankle.       No acute fractures. Soft tissue edema of the ankle.     MACRO: None   Signed by: Srinivasa Akhtar 10/9/2024 8:48 AM Dictation workstation:   [id number]    IR CVC nontunneled    Result Date: 10/8/2024  Interpreted By:  Harjinder Swan and Stevens Alex STUDY: IR CVC NONTUNNELED;  10/7/2024 10:59 am   INDICATION: Signs/Symptoms:apheresis line placement for RBCEx (typically femoral for her).   COMPARISON: None.   ACCESSION NUMBER(S): [id number]   ORDERING CLINICIAN: ALEXANDRA ARRIAGA   TECHNIQUE: INTERVENTIONALIST(S): BLAYNE Red MD   CONSENT: The patient/patient's POA/next of kin was informed of the nature of the proposed procedure. The purposes, alternatives, risks, and benefits  were explained and discussed. All questions were answered and consent was obtained.   RADIATION EXPOSURE: Fluoroscopy time: 0.1 min Dose: 3.34 mGy Dose Area Product (DAP): 714 mGy*cm^2   SEDATION: Moderate conscious IV sedation services (supervision of administration, induction, and maintenance) were provided by the physician performing the procedure with intravenous fentanyl 50mcg and versed 1mg. The physician was assisted by an independent trained observer, an interventional radiology nurse, in the continuous monitoring of patient level of consciousness and physiologic status.   MEDICATION: None.   TIME OUT: A time out was performed immediately prior to procedure start with the interventional team, correctly identifying the patient name, date of birth, MRN, procedure, anatomy (including marking of site and side), patient position, procedure consent form, relevant laboratory and imaging test results, antibiotic administration, safety precautions, and procedure-specific equipment needs.   COMPLICATIONS: No immediate adverse events identified.   FINDINGS: The patient was positioned supine on the angiography table. The right groin cutaneous tissues were prepared and draped in sterile manner. 1% lidocaine local anesthesia was instilled into the subcutaneous soft tissues at the selected access site for local anesthesia. Ultrasound images demonstrate a patent and compressible  right femoral vein. Utilizing direct ultrasound guidance and micropuncture/Seldinger technique, the  right  femoral vein was accessed. An ultrasound digital spot image was acquired and stored on the  PACS. After confirmation of location, a 018 [place]-Mandrel guidewire was introduced and advanced into the inferior vena cava utilizing intermittent fluoroscopy.   The needle was removed with the guidewire left in place and exchanged for a 5-Palestinian coaxial dilator.  The inner dilator and wire were removed and a J-tipped 035 guidewire was introduced  through the access sheath.  The skin tract was dilated with successive increase in size of vascular dilators under direct fluoroscopic visualization.   Subsequently, a temporary 13 Barbadian x 24 cm catheter was advanced with its tip overlying the cavoatrial junction under direct fluoroscopic guidance.  The catheter ports were aspirated and flushed with normal saline and charged with heparin. The external portions of the catheter were secured in place with sterile suture dressings.   The patient tolerated the procedure without complication.       1. Technically successful and uncomplicated placement of a right femoral temporary Trialysis catheter under direct ultrasound and fluoroscopic guidance - optimal catheter tip position at the inferior vena cava and the catheter is ready for immediate use.   I was present for and/or performed the critical portions of the procedure and immediately available throughout the entire procedure.   I personally reviewed the image(s) / study and resident interpretation. I agree with the findings as stated.   Performed and dictated at [facility].   MACRO: None.   Signed by: Harjinder Swan 10/8/2024 8:04 AM Dictation workstation:   [id number]    Lower extremity venous duplex bilateral    Result Date: 10/5/2024  Interpreted By:  Alejandro Mendes,  Zoe White STUDY: [facility] US LOWER EXTREMITY VENOUS DUPLEX BILATERAL;  10/5/2024 10:30 am   INDICATION: Signs/Symptoms:LLE pain and edema c/f DVT.   ,R60.0 Localized edema   COMPARISON: None.   ACCESSION NUMBER(S): [id number]   ORDERING CLINICIAN: ALEXANDRA ARRIAGA   TECHNIQUE: Vascular ultrasound of the bilateral lower extremities was performed. Real-time compression views as well as Gray scale, color Doppler and spectral Doppler waveform analysis was performed.   FINDINGS: Evaluation of the visualized portions of the bilateral common femoral vein, proximal, mid, and distal femoral vein, and  popliteal vein were performed.  Evaluation of the visualized portions of the  posterior tibial and peroneal veins were also performed.   Limitations:  Patient body habitus and lower extremity edema. All findings are reported within these limitations.   The evaluated veins demonstrate normal compressibility. There is intact venous flow demonstrating normal respiratory variability and normal augmentation of flow with calf compression. Therefore, there is no ultrasonographic evidence for deep vein thrombosis within the evaluated veins.   Respiratory variation and augmentation to calf pressure was noted.       No sonographic evidence for deep vein thrombosis within the evaluated veins of the bilateral lower extremity.   I personally reviewed the images/study and I agree with the findings as stated by Christopher Soriano MD. This study was interpreted at [facility], [place], [place]   MACRO: None   Signed by: Alejandro Mendes 10/5/2024 2:35 PM Dictation workstation:   [id number]     Assessment/Plan        This patient has a central line   Reason for the central line remaining today? Dialysis/Hemapheresis  Assessment & Plan  Sickle cell anemia with pain (Multi)    Sickle cell disease without crisis (Multi)    ASSESSMENT: 54 y.o. 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 Received R groin non-tunneled CVC 11/1 with IR. Will undergo exchange today at 1 PM. Hemolysis labs are stable, no c/f sickle cell crisis. Will c/w all home pain meds. PT/OT, plan for discharge tomorrow.     PLAN:     #HbSC SCD  - receives exchange transfusions q6 weeks  - 11/1 hemolysis labs: retic 9.3 (7.8), A phos 159  (168), Tbili 1.6 (1.9),  (402), trend daily   - no leukocytosis on admission  - HbS 24.3 (H)  - s/p IR guided R groin non-tunneled CVC 11/1  - will undergo scheduled RBC transfusion today 11/1, plan to pull line after  - pain regimen per Dr. Whaley's recent note:  Oral tylenol 650 mg every 6 hours prn for mild to moderate pain   Oral oxycodone 10-20 mg every 4-6 hours as needed for moderate to severe and breakthrough pain    Non pharmacologic methods of pain control   Flexeril 10 mg q3h prn as needed as a muscle relaxant  Avoid ER oxycontin as contributes to AMS  - Compression stockings for swelling of RLE   - Continue 2L night time oxygen   - Daily folic acid 1 mg   - Doc senna 2 tab BID and miralax prn for chronic constipation   - zofran prn for nausea     #DVT, PE  #SVC thrombus  - on no home O2, has RLE swelling but this is chronic. No LLE swelling  - on home coumadin 5 mg, held 10/31 dose given supratherapeutic INR on admission at 4. Also gave x1 IV vit K 2.5 mg (improved to 2.9)  - will restart coumadin at 4 mg today per pharmacy recs and then resume home dosing on discharge  - daily coags     #SVT  #pHTN  -metop 12.5 BID  -lasix 20 every other day      F: prn  E: K>4, Mg>2  N: regular  A: pIV. R groin CVC (11/1-)  DVT PPx: coumadin  GI PPx: no indication  - Derm FUV 11/7, Sickle Cell FUV 11/18, Cardiology FUV 2/13     CODE STATUS: FULL (confirmed on admission)   SURROGATE DECISION MAKER: Tati (mom) [phone]              Lisa Giles MD

## 2024-11-01 NOTE — PROGRESS NOTES
Pharmacy Medication History Review    Senait Narvaez is a 54 y.o. female admitted for Sickle cell anemia with pain (Multi). Pharmacy reviewed the patient's [id number] medications for accuracy.    Medications ADDED:  Metoprolol, trazodone  Medications CHANGED:  none  Medications REMOVED:   none     The list below reflects the updated PTA list.   Prior to Admission Medications   Prescriptions Last Dose Informant   albuterol 90 mcg/actuation inhaler 10/31/2024 Self   Sig: Inhale 2 puffs every 6 hours if needed for wheezing.   ascorbic acid (Vitamin C) 500 mg chewable tablet 10/31/2024 Self   Sig: Chew 1 tablet (500 mg) once daily. As needed   colchicine 0.6 mg tablet 10/31/2024 Self   Sig: Take 1 tablet (0.6 mg) by mouth 2 times a day. Continue to take for acute gout flare. Stop 48 hours after gout flare Do not fill before October 16, 2024.   cyclobenzaprine (Flexeril) 10 mg tablet 10/31/2024 Self   Sig: Take 1 tablet (10 mg) by mouth 3 times a day as needed for muscle spasms.   fluticasone (Flonase) 50 mcg/actuation nasal spray 10/31/2024 Self   Sig: Administer 2 sprays into each nostril once daily as needed for rhinitis or allergies.   folic acid (Folvite) 1 mg tablet 10/31/2024 Self   Sig: Take 1 tablet (1 mg) by mouth once daily.   furosemide (Lasix) 20 mg tablet 10/31/2024 Self   Sig: Take 1 tablet (20 mg) by mouth every other day.   metoprolol tartrate (Lopressor) 25 mg tablet  Self   Sig: Take 0.5 tablets (12.5 mg) by mouth 2 times a day.   naloxone (Narcan) 4 mg/0.1 mL nasal spray 10/31/2024 Self   Sig: Administer 1 spray (4 mg) into affected nostril(s) if needed for opioid reversal. May repeat every 2-3 minutes if needed, alternating nostrils, until medical assistance becomes available.   ondansetron (Zofran) 4 mg tablet 10/31/2024 Self   Sig: Take 1 tablet (4 mg) by mouth every 8 hours if needed for nausea or vomiting.   oxyCODONE (Roxicodone) 10 mg immediate release tablet     Sig: Take 1 tablet (10 mg)  "by mouth every 4 hours if needed for severe pain (7 - 10) (pain) for up to 3 days.   oxygen (O2) gas therapy 10/31/2024 Self   Sig: Inhale 1 each continuously.   oxygen (O2) gas therapy 10/31/2024 Self   Sig: Inhale 1 each once every 24 hours.   oxygen (O2) gas therapy 10/31/2024 Self   Sig: Inhale 1 each continuously.   traZODone (Desyrel) 50 mg tablet  Self   Sig: Take 1 tablet (50 mg) by mouth as needed at bedtime for sleep.   triamcinolone (Kenalog) 0.1 % cream 10/31/2024 Self   Sig: Apply topically 2 times a day. Apply triamcinolone 0.1% cream to both the black plaques as well as the red areas across the trunk and thighs   warfarin (Coumadin) 5 mg tablet  Self   Sig: Take 1 tablet (5 mg) by mouth once daily in the evening.   white petrolatum (Aquaphor) 41 % ointment ointment 10/31/2024 Self   Sig: Apply 2 Applications topically 2 times a day. Apply vaseline to all eroded/denuded areas. Mepilex transfer sheets may be used for areas of friction      Facility-Administered Medications: None        Patient declines M2B at discharge.     Sources:   [facility]  Pharmacy dispense history  Patient interview Good historian  Chart Review  Care Everywhere  [facility] office visit note 10/30/24  Anticoagulation office visit notes 8/29 and 9/12    Additional Comments:  Trazodone 50mg at bedtime as needed-  On 11/1/24 patient reports she ran out and needs a refill.  Last filled at pharmacy on 2/12/24  Colchicine 0.6mg-  On 11/1/24 patient reports this is a new medication she has not started at home yet (script sent to pharmacy on 10/16/24)      Zack Concepcion, PharmD  Transitions of Care Pharmacist  11/01/24     Secure Chat preferred   If no response call [phone] or iDiDiD \"Med Rec\"    "

## 2024-11-01 NOTE — CARE PLAN
The patient's goals for the shift include      The clinical goals for the shift include Pt will remain HDS and VSS throughout shift end on 11/2/24 @1900      Problem: Safety - Adult  Goal: Free from fall injury  Outcome: Progressing     Problem: Discharge Planning  Goal: Discharge to home or other facility with appropriate resources  Outcome: Progressing     Problem: Chronic Conditions and Co-morbidities  Goal: Patient's chronic conditions and co-morbidity symptoms are monitored and maintained or improved  Outcome: Progressing     Problem: Fall/Injury  Goal: Not fall by end of shift  Outcome: Progressing  Goal: Be free from injury by end of the shift  Outcome: Progressing  Goal: Verbalize understanding of personal risk factors for fall in the hospital  Outcome: Progressing  Goal: Verbalize understanding of risk factor reduction measures to prevent injury from fall in the home  Outcome: Progressing  Goal: Use assistive devices by end of the shift  Outcome: Progressing  Goal: Pace activities to prevent fatigue by end of the shift  Outcome: Progressing

## 2024-11-01 NOTE — PROGRESS NOTES
11/01/24 1200   Discharge Planning   Living Arrangements Alone   Support Systems Parent;Children   Assistance Needed walker/rollator   Home or Post Acute Services In home services   Type of Home Care Services Home PT;Home OT   Expected Discharge Disposition Home     Care Transitions Note  11/1/24  LSW met with patient at bedside. This LSW is familiar with Senait. Pt admitted for routine exchange for sickle cell management. Pt came from [facility]. She is doing much better and would like to go home with [facility]-PT/OT if possible. Patient was working with PT when LSW left, and plan is for Senait to return home. Senait asked for a rollator. She has no other identified needs at this time. LSW following for discharge planning. IMM needed and signed by patient. LSW asked team to place an order for a rollator.    Order in for rollator and [facility]-PT/OT. Sent referral in [facility]Port to [place] for rollator, will follow.  Bouchra Stout, MSW, LSW

## 2024-11-02 ENCOUNTER — OFFICE VISIT (OUTPATIENT)
Dept: HEMATOLOGY/ONCOLOGY | Facility: HOSPITAL | Age: 54
DRG: 812 | End: 2024-11-02
Payer: COMMERCIAL

## 2024-11-02 ENCOUNTER — APPOINTMENT (OUTPATIENT)
Dept: RADIOLOGY | Facility: HOSPITAL | Age: 54
DRG: 812 | End: 2024-11-02
Payer: COMMERCIAL

## 2024-11-02 ENCOUNTER — HOME HEALTH ADMISSION (OUTPATIENT)
Dept: HOME HEALTH SERVICES | Facility: HOME HEALTH | Age: 54
End: 2024-11-02
Payer: COMMERCIAL

## 2024-11-02 ENCOUNTER — DOCUMENTATION (OUTPATIENT)
Dept: HOME HEALTH SERVICES | Facility: HOME HEALTH | Age: 54
End: 2024-11-02
Payer: COMMERCIAL

## 2024-11-02 LAB
ALBUMIN SERPL BCP-MCNC: 2.5 G/DL (ref 3.4–5)
ALP SERPL-CCNC: 153 U/L (ref 33–110)
ALT SERPL W P-5'-P-CCNC: 22 U/L (ref 7–45)
ANION GAP SERPL CALC-SCNC: 9 MMOL/L (ref 10–20)
APPEARANCE UR: ABNORMAL
APTT PPP: 34 SECONDS (ref 27–38)
AST SERPL W P-5'-P-CCNC: 39 U/L (ref 9–39)
BACTERIA #/AREA URNS AUTO: ABNORMAL /HPF
BASOPHILS # BLD AUTO: 0.04 X10*3/UL (ref 0–0.1)
BASOPHILS NFR BLD AUTO: 0.3 %
BILIRUB DIRECT SERPL-MCNC: 0.7 MG/DL (ref 0–0.3)
BILIRUB SERPL-MCNC: 2.2 MG/DL (ref 0–1.2)
BILIRUB UR STRIP.AUTO-MCNC: NEGATIVE MG/DL
BUN SERPL-MCNC: 11 MG/DL (ref 6–23)
CALCIUM SERPL-MCNC: 8 MG/DL (ref 8.6–10.6)
CARDIAC TROPONIN I PNL SERPL HS: <3 NG/L (ref 0–34)
CHLORIDE SERPL-SCNC: 98 MMOL/L (ref 98–107)
CO2 SERPL-SCNC: 31 MMOL/L (ref 21–32)
COLOR UR: YELLOW
CREAT SERPL-MCNC: 1.48 MG/DL (ref 0.5–1.05)
EGFRCR SERPLBLD CKD-EPI 2021: 42 ML/MIN/1.73M*2
EOSINOPHIL # BLD AUTO: 0.63 X10*3/UL (ref 0–0.7)
EOSINOPHIL NFR BLD AUTO: 4.3 %
ERYTHROCYTE [DISTWIDTH] IN BLOOD BY AUTOMATED COUNT: 17.5 % (ref 11.5–14.5)
GLUCOSE SERPL-MCNC: 89 MG/DL (ref 74–99)
GLUCOSE UR STRIP.AUTO-MCNC: NORMAL MG/DL
HCT VFR BLD AUTO: 26.9 % (ref 36–46)
HGB BLD-MCNC: 9 G/DL (ref 12–16)
HGB RETIC QN: 29 PG (ref 28–38)
HYALINE CASTS #/AREA URNS AUTO: ABNORMAL /LPF
IMM GRANULOCYTES # BLD AUTO: 0.14 X10*3/UL (ref 0–0.7)
IMM GRANULOCYTES NFR BLD AUTO: 0.9 % (ref 0–0.9)
IMMATURE RETIC FRACTION: 26.6 %
INR PPP: 1.7 (ref 0.9–1.1)
KETONES UR STRIP.AUTO-MCNC: NEGATIVE MG/DL
LDH SERPL L TO P-CCNC: 285 U/L (ref 84–246)
LEUKOCYTE ESTERASE UR QL STRIP.AUTO: NEGATIVE
LYMPHOCYTES # BLD AUTO: 3.88 X10*3/UL (ref 1.2–4.8)
LYMPHOCYTES NFR BLD AUTO: 26.2 %
MAGNESIUM SERPL-MCNC: 1.85 MG/DL (ref 1.6–2.4)
MCH RBC QN AUTO: 29.3 PG (ref 26–34)
MCHC RBC AUTO-ENTMCNC: 33.5 G/DL (ref 32–36)
MCV RBC AUTO: 88 FL (ref 80–100)
MONOCYTES # BLD AUTO: 1.15 X10*3/UL (ref 0.1–1)
MONOCYTES NFR BLD AUTO: 7.8 %
MUCOUS THREADS #/AREA URNS AUTO: ABNORMAL /LPF
NEUTROPHILS # BLD AUTO: 8.97 X10*3/UL (ref 1.2–7.7)
NEUTROPHILS NFR BLD AUTO: 60.5 %
NITRITE UR QL STRIP.AUTO: NEGATIVE
NRBC BLD-RTO: 0.6 /100 WBCS (ref 0–0)
PH UR STRIP.AUTO: 5.5 [PH]
PHOSPHATE SERPL-MCNC: 4.6 MG/DL (ref 2.5–4.9)
PLATELET # BLD AUTO: 207 X10*3/UL (ref 150–450)
POTASSIUM SERPL-SCNC: 4.4 MMOL/L (ref 3.5–5.3)
PROT SERPL-MCNC: 6.2 G/DL (ref 6.4–8.2)
PROT UR STRIP.AUTO-MCNC: ABNORMAL MG/DL
PROTHROMBIN TIME: 19.2 SECONDS (ref 9.8–12.8)
RBC # BLD AUTO: 3.07 X10*6/UL (ref 4–5.2)
RBC # UR STRIP.AUTO: NEGATIVE /UL
RBC #/AREA URNS AUTO: ABNORMAL /HPF
RETICS #: 0.21 X10*6/UL (ref 0.02–0.08)
RETICS/RBC NFR AUTO: 6.8 % (ref 0.5–2)
SODIUM SERPL-SCNC: 134 MMOL/L (ref 136–145)
SP GR UR STRIP.AUTO: 1.02
SQUAMOUS #/AREA URNS AUTO: ABNORMAL /HPF
UROBILINOGEN UR STRIP.AUTO-MCNC: ABNORMAL MG/DL
WBC # BLD AUTO: 14.8 X10*3/UL (ref 4.4–11.3)
WBC #/AREA URNS AUTO: ABNORMAL /HPF

## 2024-11-02 PROCEDURE — 93005 ELECTROCARDIOGRAM TRACING: CPT

## 2024-11-02 PROCEDURE — 99232 SBSQ HOSP IP/OBS MODERATE 35: CPT

## 2024-11-02 PROCEDURE — 81001 URINALYSIS AUTO W/SCOPE: CPT

## 2024-11-02 PROCEDURE — 71045 X-RAY EXAM CHEST 1 VIEW: CPT | Performed by: RADIOLOGY

## 2024-11-02 PROCEDURE — 2500000001 HC RX 250 WO HCPCS SELF ADMINISTERED DRUGS (ALT 637 FOR MEDICARE OP)

## 2024-11-02 PROCEDURE — 83735 ASSAY OF MAGNESIUM: CPT

## 2024-11-02 PROCEDURE — 85045 AUTOMATED RETICULOCYTE COUNT: CPT

## 2024-11-02 PROCEDURE — 83615 LACTATE (LD) (LDH) ENZYME: CPT

## 2024-11-02 PROCEDURE — 85610 PROTHROMBIN TIME: CPT

## 2024-11-02 PROCEDURE — 2500000001 HC RX 250 WO HCPCS SELF ADMINISTERED DRUGS (ALT 637 FOR MEDICARE OP): Performed by: INTERNAL MEDICINE

## 2024-11-02 PROCEDURE — 84075 ASSAY ALKALINE PHOSPHATASE: CPT

## 2024-11-02 PROCEDURE — 2500000004 HC RX 250 GENERAL PHARMACY W/ HCPCS (ALT 636 FOR OP/ED)

## 2024-11-02 PROCEDURE — 1200000003 HC ONCOLOGY  ROOM WITH TELEMETRY DAILY

## 2024-11-02 PROCEDURE — 80048 BASIC METABOLIC PNL TOTAL CA: CPT

## 2024-11-02 PROCEDURE — 93010 ELECTROCARDIOGRAM REPORT: CPT | Performed by: INTERNAL MEDICINE

## 2024-11-02 PROCEDURE — 84100 ASSAY OF PHOSPHORUS: CPT

## 2024-11-02 PROCEDURE — 2500000002 HC RX 250 W HCPCS SELF ADMINISTERED DRUGS (ALT 637 FOR MEDICARE OP, ALT 636 FOR OP/ED)

## 2024-11-02 PROCEDURE — 85025 COMPLETE CBC W/AUTO DIFF WBC: CPT

## 2024-11-02 PROCEDURE — 71045 X-RAY EXAM CHEST 1 VIEW: CPT

## 2024-11-02 PROCEDURE — 84484 ASSAY OF TROPONIN QUANT: CPT

## 2024-11-02 PROCEDURE — 85730 THROMBOPLASTIN TIME PARTIAL: CPT

## 2024-11-02 PROCEDURE — 80053 COMPREHEN METABOLIC PANEL: CPT

## 2024-11-02 RX ORDER — ALUMINUM HYDROXIDE, MAGNESIUM HYDROXIDE, AND SIMETHICONE 1200; 120; 1200 MG/30ML; MG/30ML; MG/30ML
20 SUSPENSION ORAL 4 TIMES DAILY
Status: DISPENSED | OUTPATIENT
Start: 2024-11-02

## 2024-11-02 RX ORDER — HYDROMORPHONE HYDROCHLORIDE 1 MG/ML
0.5 INJECTION, SOLUTION INTRAMUSCULAR; INTRAVENOUS; SUBCUTANEOUS ONCE
Status: COMPLETED | OUTPATIENT
Start: 2024-11-02 | End: 2024-11-02

## 2024-11-02 RX ORDER — WARFARIN SODIUM 5 MG/1
5 TABLET ORAL DAILY
Status: DISPENSED | OUTPATIENT
Start: 2024-11-02

## 2024-11-02 RX ORDER — OXYCODONE HYDROCHLORIDE 10 MG/1
30 TABLET ORAL EVERY 6 HOURS PRN
Status: DISCONTINUED | OUTPATIENT
Start: 2024-11-02 | End: 2024-11-03

## 2024-11-02 RX ORDER — LANOLIN ALCOHOL/MO/W.PET/CERES
800 CREAM (GRAM) TOPICAL ONCE
Status: COMPLETED | OUTPATIENT
Start: 2024-11-02 | End: 2024-11-02

## 2024-11-02 ASSESSMENT — COGNITIVE AND FUNCTIONAL STATUS - GENERAL
MOBILITY SCORE: 22
CLIMB 3 TO 5 STEPS WITH RAILING: A LITTLE
WALKING IN HOSPITAL ROOM: A LITTLE
DRESSING REGULAR LOWER BODY CLOTHING: A LITTLE
HELP NEEDED FOR BATHING: A LITTLE
MOBILITY SCORE: 22
WALKING IN HOSPITAL ROOM: A LITTLE
DAILY ACTIVITIY SCORE: 22
DRESSING REGULAR LOWER BODY CLOTHING: A LITTLE
CLIMB 3 TO 5 STEPS WITH RAILING: A LITTLE
DAILY ACTIVITIY SCORE: 22
HELP NEEDED FOR BATHING: A LITTLE

## 2024-11-02 ASSESSMENT — PAIN SCALES - GENERAL
PAINLEVEL_OUTOF10: 8
PAINLEVEL_OUTOF10: 8
PAINLEVEL_OUTOF10: 7
PAINLEVEL_OUTOF10: 5 - MODERATE PAIN
PAINLEVEL_OUTOF10: 8
PAINLEVEL_OUTOF10: 7
PAINLEVEL_OUTOF10: 8

## 2024-11-02 ASSESSMENT — PAIN - FUNCTIONAL ASSESSMENT
PAIN_FUNCTIONAL_ASSESSMENT: 0-10

## 2024-11-02 NOTE — PROGRESS NOTES
Senait Narvaez is a 54 y.o. female on day 2 of admission presenting with Sickle cell anemia with pain (Multi).    Transitional Care Coordinator Note: Met with patient to discuss discharge planning s/p admission.  Patient will discharge home today with [facility]( SOC 11/4). Patient endorses having transport and states she is in need of a rollator. TCC review of careport updated script needed. TCC provided updated script to Brianna and updated patient. No other needs at this time.   Josephine Santiago RN TCC via Epic.

## 2024-11-02 NOTE — PROGRESS NOTES
Senait Narvaez is a 54 y.o. female on day 2 of admission presenting with Sickle cell anemia with pain (Multi).    Subjective   NAEON. Received her exchange transfusion yesterday which she thinks went well. After, started endorsing chest pain with nausea which she has not had previously. EKG wnl. Trop < 3. Given Tums without improvement. Describes as sharp pains in her upper chest, 10/10, non-radiating, lasts for a few seconds-minute then self-resolves without intervention. No associated SOB, cough, sour taste. Otherwise denies HA, vision changes, abdominal pain. Otherwise reports same L ankle pain that is chronic.       Objective     Last Recorded Vitals  /72 (BP Location: Left arm, Patient Position: Lying)   Pulse 97   Temp 36.5 °C (97.7 °F) (Temporal)   Resp 20   Wt 108 kg (238 lb 12.8 oz)   SpO2 94%   Intake/Output last 3 Shifts:    Intake/Output Summary (Last 24 hours) at 11/2/2024 1007  Last data filed at 11/2/2024 0958  Gross per 24 hour   Intake 1401 ml   Output 1150 ml   Net 251 ml       Admission Weight  Weight: 108 kg (238 lb 12.8 oz) (10/31/24 1100)    Daily Weight  10/31/24 : 108 kg (238 lb 12.8 oz)    Image Results  XR chest 1 view  Narrative: STUDY:  XR CHEST 1 VIEW;  11/2/2024 9:16 am      INDICATION:  Signs/Symptoms:Chest pain.      COMPARISON:  Chest radiograph 09/27/2024      ACCESSION NUMBER(S):  [id number]      ORDERING CLINICIAN:  JOSE G PINEDA      FINDINGS:  AP radiograph of the chest was provided.          CARDIOMEDIASTINAL SILHOUETTE:  Cardiomediastinal silhouette is stable in size and configuration.      LUNGS:  Similar appearance of prominent perihilar lung markings. Persistent  hazy opacity in the left lower lung with blunting of the left  costophrenic angle. No evidence of pneumothorax.      ABDOMEN:  No remarkable upper abdominal findings.      BONES:  No acute osseous changes.      Impression: 1.  Similar appearance of mild interstitial pulmonary edema.  2. Stable  appearance of small left pleural effusion with adjacent  atelectasis.      I personally reviewed the images/study and resident's interpretation  and I agree with the findings as stated by Toya Coreas MD (resident  radiologist). This study was analyzed and interpreted at [facility], [place], [place].      MACRO:  None          Dictation workstation:   [id number]    PHYSICAL EXAM:  General: conversant, appears stated age, laying in bed in NAD  HEENT: PERRL, no scleral icterus or conjunctivitis  Chest: ctab, not on supp oxygen, no tenderness to palpation of upper chest  Cardiac: RRR, normal s1, s2, no JVD  Abdomen: soft, ND, NT  EXT: RLE with trace swelling, LLE without swelling. +R groin non-tunneled CVC, no evidence of hematoma, erythema or swelling  Neuro: Aox3, follows commands  Psych: coherent thought process, appropriate mood and affect    Relevant Results  Scheduled medications  alum-mag hydroxide-simeth, 20 mL, oral, 4x daily  ascorbic acid, 500 mg, oral, Daily  colchicine, 0.6 mg, oral, BID  folic acid, 1 mg, oral, Daily  furosemide, 20 mg, oral, Every other day  metoprolol tartrate, 12.5 mg, oral, BID  sennosides-docusate sodium, 2 tablet, oral, q12h  triamcinolone, , Topical, BID  warfarin, 5 mg, oral, Daily  white petrolatum, 2 Application, Topical, BID      Continuous medications     PRN medications  PRN medications: acetaminophen, albuterol, calcium carbonate, cyclobenzaprine, fluticasone, ondansetron, oxyCODONE, oxyCODONE, polyethylene glycol    Results for orders placed or performed during the hospital encounter of 10/31/24 (from the past 24 hours)   CBC   Result Value Ref Range    WBC 9.0 4.4 - 11.3 x10*3/uL    nRBC 0.6 (H) 0.0 - 0.0 /100 WBCs    RBC 3.20 (L) 4.00 - 5.20 x10*6/uL    Hemoglobin 9.5 (L) 12.0 - 16.0 g/dL    Hematocrit 28.0 (L) 36.0 - 46.0 %    MCV 88 80 - 100 fL    MCH 29.7 26.0 - 34.0 pg    MCHC 33.9 32.0 - 36.0 g/dL    RDW 16.9 (H) 11.5 - 14.5 %     Platelets 227 150 - 450 x10*3/uL   Calcium, Ionized   Result Value Ref Range    POCT Calcium, Ionized 1.04 (L) 1.1 - 1.33 mmol/L   CBC and Auto Differential   Result Value Ref Range    WBC 14.8 (H) 4.4 - 11.3 x10*3/uL    nRBC 0.6 (H) 0.0 - 0.0 /100 WBCs    RBC 3.07 (L) 4.00 - 5.20 x10*6/uL    Hemoglobin 9.0 (L) 12.0 - 16.0 g/dL    Hematocrit 26.9 (L) 36.0 - 46.0 %    MCV 88 80 - 100 fL    MCH 29.3 26.0 - 34.0 pg    MCHC 33.5 32.0 - 36.0 g/dL    RDW 17.5 (H) 11.5 - 14.5 %    Platelets 207 150 - 450 x10*3/uL    Neutrophils % 60.5 40.0 - 80.0 %    Immature Granulocytes %, Automated 0.9 0.0 - 0.9 %    Lymphocytes % 26.2 13.0 - 44.0 %    Monocytes % 7.8 2.0 - 10.0 %    Eosinophils % 4.3 0.0 - 6.0 %    Basophils % 0.3 0.0 - 2.0 %    Neutrophils Absolute 8.97 (H) 1.20 - 7.70 x10*3/uL    Immature Granulocytes Absolute, Automated 0.14 0.00 - 0.70 x10*3/uL    Lymphocytes Absolute 3.88 1.20 - 4.80 x10*3/uL    Monocytes Absolute 1.15 (H) 0.10 - 1.00 x10*3/uL    Eosinophils Absolute 0.63 0.00 - 0.70 x10*3/uL    Basophils Absolute 0.04 0.00 - 0.10 x10*3/uL   Magnesium   Result Value Ref Range    Magnesium 1.85 1.60 - 2.40 mg/dL   Hepatic Function Panel   Result Value Ref Range    Albumin 2.5 (L) 3.4 - 5.0 g/dL    Bilirubin, Total 2.2 (H) 0.0 - 1.2 mg/dL    Bilirubin, Direct 0.7 (H) 0.0 - 0.3 mg/dL    Alkaline Phosphatase 153 (H) 33 - 110 U/L    ALT 22 7 - 45 U/L    AST 39 9 - 39 U/L    Total Protein 6.2 (L) 6.4 - 8.2 g/dL   Reticulocytes   Result Value Ref Range    Retic % 6.8 (H) 0.5 - 2.0 %    Retic Absolute 0.210 (H) 0.018 - 0.083 x10*6/uL    Reticulocyte Hemoglobin 29 28 - 38 pg    Immature Retic fraction 26.6 (H) <=16.0 %   Lactate dehydrogenase   Result Value Ref Range     (H) 84 - 246 U/L   Coagulation Screen   Result Value Ref Range    Protime 19.2 (H) 9.8 - 12.8 seconds    INR 1.7 (H) 0.9 - 1.1    aPTT 34 27 - 38 seconds   Phosphorus   Result Value Ref Range    Phosphorus 4.6 2.5 - 4.9 mg/dL   Basic Metabolic  Panel   Result Value Ref Range    Glucose 89 74 - 99 mg/dL    Sodium 134 (L) 136 - 145 mmol/L    Potassium 4.4 3.5 - 5.3 mmol/L    Chloride 98 98 - 107 mmol/L    Bicarbonate 31 21 - 32 mmol/L    Anion Gap 9 (L) 10 - 20 mmol/L    Urea Nitrogen 11 6 - 23 mg/dL    Creatinine 1.48 (H) 0.50 - 1.05 mg/dL    eGFR 42 (L) >60 mL/min/1.73m*2    Calcium 8.0 (L) 8.6 - 10.6 mg/dL   Troponin I, High Sensitivity   Result Value Ref Range    Troponin I, High Sensitivity (CMC) <3 0 - 34 ng/L     *Note: Due to a large number of results and/or encounters for the requested time period, some results have not been displayed. A complete set of results can be found in Results Review.     XR chest 1 view    Result Date: 11/2/2024  STUDY: XR CHEST 1 VIEW;  11/2/2024 9:16 am   INDICATION: Signs/Symptoms:Chest pain.   COMPARISON: Chest radiograph 09/27/2024   ACCESSION NUMBER(S): [id number]   ORDERING CLINICIAN: JOSE G PINEDA   FINDINGS: AP radiograph of the chest was provided.     CARDIOMEDIASTINAL SILHOUETTE: Cardiomediastinal silhouette is stable in size and configuration.   LUNGS: Similar appearance of prominent perihilar lung markings. Persistent hazy opacity in the left lower lung with blunting of the left costophrenic angle. No evidence of pneumothorax.   ABDOMEN: No remarkable upper abdominal findings.   BONES: No acute osseous changes.       1.  Similar appearance of mild interstitial pulmonary edema. 2. Stable appearance of small left pleural effusion with adjacent atelectasis.   I personally reviewed the images/study and resident's interpretation and I agree with the findings as stated by Toya Coreas MD (resident radiologist). This study was analyzed and interpreted at [facility], [place], [place].   MACRO: None     Dictation workstation:   [id number]    IR CVC nontunneled    Result Date: 11/1/2024  Interpreted By:  Arnoldo Noonan  and Frances Hopkins STUDY: IR CVC NONTUNNELED; 11/1/2024 8:36  am   INDICATION: Signs/Symptoms:History of sickle cell disease on chronic red cell exchange transfusion.   COMPARISON: IR CVC non tunneled 10/07/2024   ACCESSION NUMBER(S): [id number]   ORDERING CLINICIAN: MARIYA TRAN   TECHNIQUE: INTERVENTIONALIST(S): BAL DEL ROSARIO MD   CONSENT: The patient/patient's POA/next of kin was informed of the nature of the proposed procedure. The purposes, alternatives, risks, and benefits were explained and discussed. All questions were answered and consent was obtained.   RADIATION EXPOSURE: Fluoroscopy time: 0.1 min. Dose: 11.08 mGy. Dose Area Product (DAP): 4101   SEDATION: No sedation was given.   MEDICATION/CONTRAST: No additional   TIME OUT: A time out was performed immediately prior to procedure start with the interventional team, correctly identifying the patient name, date of birth, MRN, procedure, anatomy (including marking of site and side), patient position, procedure consent form, relevant laboratory and imaging test results, antibiotic administration, safety precautions, and procedure-specific equipment needs.   COMPLICATIONS: No immediate adverse events identified.   FINDINGS: The patient was positioned supine on the angiography table. The right  proximal thigh cutaneous tissues were prepared and draped in sterile manner.  1% lidocaine local anesthesia was instilled into the subcutaneous soft tissues at the selected access site for local anesthesia. Ultrasound images demonstrate a patent and compressible right  common femoral vein. Utilizing direct ultrasound guidance and micropuncture/Seldinger technique, the  right  common femoral vein was accessed. An ultrasound digital spot image was acquired and stored on the  PACS. After confirmation of location, a 018 [place]-Mandrel guidewire was introduced and advanced into the inferior vena cava utilizing intermittent fluoroscopy.   The needle was removed with the guidewire left in place and exchanged for a 5-Japanese coaxial  dilator.  The inner dilator and wire were removed and a J-tipped 035 guidewire was introduced through the access sheath.  The skin tract was dilated with successive increase in size of vascular dilators under direct fluoroscopic visualization.   Subsequently, a temporary 13 [place] x 24 cm Trialysis catheter was advanced with its tip overlying the cavoatrial junction under direct fluoroscopic guidance.  The catheter ports were aspirated and flushed with normal saline and charged with heparin.  The external portions of the catheter were secured in place with sterile suture dressings.   The patient tolerated the procedure without complication.       1.  Technically uneventful placement of a   right  common femoral temporary Trialysis catheter under direct ultrasound and fluoroscopic guidance - optimal catheter tip position at the right atrial inferior vena caval junction and the catheter is ready for utilization.   I was present for and/or performed the critical portions of the procedure and immediately available throughout the entire procedure.   I personally reviewed the image(s) / study and resident interpretation. I agree with the findings as stated.   Performed and dictated at [facility].   Signed by: Arnoldo Noonan 11/1/2024 11:08 AM Dictation workstation:   [id number]    BI mammo bilateral diagnostic tomosynthesis    Result Date: 10/16/2024  Interpreted By:  Debbie Magallon and Liller Gregory STUDY: BI MAMMO BILATERAL DIAGNOSTIC TOMOSYNTHESIS; BI US BREAST LIMITED LEFT;  10/16/2024 10:21 am; 10/16/2024 10:59 am   ACCESSION NUMBER(S): [id number]; [id number]   ORDERING CLINICIAN: ANGELA MEJIA   INDICATION: Patient presents with skin hardening and discoloration of the left breast. History of sickle cell disease   COMPARISON: Mammogram 08/16/2023, 01/18/2021, 12/30/2019, breast ultrasound 11/27/2023.   FINDINGS: MAMMOGRAPHY: 2D and tomosynthesis images were  reviewed at 1 mm slice thickness.   Density:  There are scattered areas of fibroglandular density.   There is marked asymmetric diffuse left breast skin thickening and trabecular thickening. No suspicious masses or calcifications are identified in either breast. A slightly prominent lymph node is again seen in the lower left axilla, similar compared to previous mammograms.   ULTRASOUND: Targeted ultrasound was performed of the entire left breast by a registered sonographer with elastography. There is diffuse skin thickening of the left breast measuring up to 0.7 cm and diffuse parenchymal edema, correlating with mammographic findings. No focal abnormality is identified on the ultrasound examination.       1. Marked left breast diffuse edema, suggestive of vaso-occlusive etiology. Clinical correlation and management till resolution is recommended. 2. No mammographic or targeted sonographic evidence of malignancy.   BI-RADS CATEGORY: BI-RADS Category:  2 Benign. Recommendation:  Clinical follow-up. Recommended Date:  Immediate. Laterality:  Left.   For any future breast imaging appointments, please call [phone]).   I personally reviewed the images/study and I agree with the findings as stated above by resident physician, Dr. Quan Bergman.   MACRO: None   Signed by: Debbie Magallon 10/16/2024 11:41 AM Dictation workstation:   [id number]    BI US breast limited left    Result Date: 10/16/2024  Interpreted By:  Debbie Magallon and Liller Gregory STUDY: BI MAMMO BILATERAL DIAGNOSTIC TOMOSYNTHESIS; BI US BREAST LIMITED LEFT;  10/16/2024 10:21 am; 10/16/2024 10:59 am   ACCESSION NUMBER(S): [id number]; [id number]   ORDERING CLINICIAN: ANGELA MEJIA   INDICATION: Patient presents with skin hardening and discoloration of the left breast. History of sickle cell disease   COMPARISON: Mammogram 08/16/2023, 01/18/2021, 12/30/2019, breast ultrasound 11/27/2023.   FINDINGS: MAMMOGRAPHY: 2D and  tomosynthesis images were reviewed at 1 mm slice thickness.   Density:  There are scattered areas of fibroglandular density.   There is marked asymmetric diffuse left breast skin thickening and trabecular thickening. No suspicious masses or calcifications are identified in either breast. A slightly prominent lymph node is again seen in the lower left axilla, similar compared to previous mammograms.   ULTRASOUND: Targeted ultrasound was performed of the entire left breast by a registered sonographer with elastography. There is diffuse skin thickening of the left breast measuring up to 0.7 cm and diffuse parenchymal edema, correlating with mammographic findings. No focal abnormality is identified on the ultrasound examination.       1. Marked left breast diffuse edema, suggestive of vaso-occlusive etiology. Clinical correlation and management till resolution is recommended. 2. No mammographic or targeted sonographic evidence of malignancy.   BI-RADS CATEGORY: BI-RADS Category:  2 Benign. Recommendation:  Clinical follow-up. Recommended Date:  Immediate. Laterality:  Left.   For any future breast imaging appointments, please call [phone]).   I personally reviewed the images/study and I agree with the findings as stated above by resident physician, Dr. Quan Bergman.   MACRO: None   Signed by: Debbie Magallon 10/16/2024 11:41 AM Dictation workstation:   [id number]    CT ankle left with IV contrast    Result Date: 10/14/2024  Interpreted By:  Vivek Ji and Lawrence Austen STUDY: CT ANKLE LEFT W IV CONTRAST;  10/13/2024 4:00 pm   INDICATION: Signs/Symptoms:sickle cell, increased ankle pain.   COMPARISON: Radiographs 10/08/2024.   ACCESSION NUMBER(S): [id number]   ORDERING CLINICIAN: DARIN ANTONIO   TECHNIQUE: CT imaging of the  left ankle was obtained  with administration of intravenous contrast medium. Coronal and sagittal reformatted images were performed.   FINDINGS: OSSEOUS STRUCTURES: No  acute fractures. Remote fracture deformity of the distal fibula metaphysis. Joint alignment is maintained. Mild subchondral sclerotic changes and joint space narrowing of the midfoot. There is mild sclerotic and subchondral cystic changes of the tibiotalar joint. No evidence of serpiginous sclerosis or lucency to definitively suggest avascular necrosis.   SOFT TISSUES: Diffuse soft tissue edema of the distal lower extremity and ankle without focal fluid collection or abnormal enhancement. The visualized ligamentous and tendinous structures are intact.       1. Diffuse nonspecific edema of the distal lower extremity and ankle without focal fluid collection or abnormal enhancement. Findings may represent vascular congestion, and cellulitis is felt less likely with the lack of enhancement but is not entirely excluded. No acute osseous findings. 2. No osseous manifestations of avascular necrosis within the limitations of CT modality. 3. Mild degenerative changes of the mid and hindfoot.     MACRO: None   Signed by: Vivek Ji 10/14/2024 11:09 AM Dictation workstation:   [id number]    XR ankle left 3+ views    Result Date: 10/9/2024  Interpreted By:  Srinivasa Akhtar, STUDY: XR ANKLE LEFT 3+ VIEWS; ;  10/8/2024 6:50 pm   INDICATION: Signs/Symptoms:sickle cell, increased left ankle pain.     COMPARISON: None.   ACCESSION NUMBER(S): [id number]   ORDERING CLINICIAN: DARIN ANTONIO   FINDINGS: Three views of the left ankle.   No acute fracture. Joint alignment is maintained. Soft tissue edema of the ankle.       No acute fractures. Soft tissue edema of the ankle.     MACRO: None   Signed by: Srinivasa Akhtar 10/9/2024 8:48 AM Dictation workstation:   [id number]    IR CVC nontunneled    Result Date: 10/8/2024  Interpreted By:  Harjinder Swan and Stevens Alex STUDY: IR CVC NONTUNNELED;  10/7/2024 10:59 am   INDICATION: Signs/Symptoms:apheresis line placement for RBCEx (typically femoral for her).   COMPARISON:  None.   ACCESSION NUMBER(S): [id number]   ORDERING CLINICIAN: ALEXANDRA ARRIAGA   TECHNIQUE: INTERVENTIONALIST(S): BLAYNE Red MD   CONSENT: The patient/patient's POA/next of kin was informed of the nature of the proposed procedure. The purposes, alternatives, risks, and benefits were explained and discussed. All questions were answered and consent was obtained.   RADIATION EXPOSURE: Fluoroscopy time: 0.1 min Dose: 3.34 mGy Dose Area Product (DAP): 714 mGy*cm^2   SEDATION: Moderate conscious IV sedation services (supervision of administration, induction, and maintenance) were provided by the physician performing the procedure with intravenous fentanyl 50mcg and versed 1mg. The physician was assisted by an independent trained observer, an interventional radiology nurse, in the continuous monitoring of patient level of consciousness and physiologic status.   MEDICATION: None.   TIME OUT: A time out was performed immediately prior to procedure start with the interventional team, correctly identifying the patient name, date of birth, MRN, procedure, anatomy (including marking of site and side), patient position, procedure consent form, relevant laboratory and imaging test results, antibiotic administration, safety precautions, and procedure-specific equipment needs.   COMPLICATIONS: No immediate adverse events identified.   FINDINGS: The patient was positioned supine on the angiography table. The right groin cutaneous tissues were prepared and draped in sterile manner. 1% lidocaine local anesthesia was instilled into the subcutaneous soft tissues at the selected access site for local anesthesia. Ultrasound images demonstrate a patent and compressible  right femoral vein. Utilizing direct ultrasound guidance and micropuncture/Seldinger technique, the  right  femoral vein was accessed. An ultrasound digital spot image was acquired and stored on the  PACS. After confirmation of location, a 018  [place]-Mandrel guidewire was introduced and advanced into the inferior vena cava utilizing intermittent fluoroscopy.   The needle was removed with the guidewire left in place and exchanged for a 5-Sinhala coaxial dilator.  The inner dilator and wire were removed and a J-tipped 035 guidewire was introduced through the access sheath.  The skin tract was dilated with successive increase in size of vascular dilators under direct fluoroscopic visualization.   Subsequently, a temporary 13 Sinhala x 24 cm catheter was advanced with its tip overlying the cavoatrial junction under direct fluoroscopic guidance.  The catheter ports were aspirated and flushed with normal saline and charged with heparin. The external portions of the catheter were secured in place with sterile suture dressings.   The patient tolerated the procedure without complication.       1. Technically successful and uncomplicated placement of a right femoral temporary Trialysis catheter under direct ultrasound and fluoroscopic guidance - optimal catheter tip position at the inferior vena cava and the catheter is ready for immediate use.   I was present for and/or performed the critical portions of the procedure and immediately available throughout the entire procedure.   I personally reviewed the image(s) / study and resident interpretation. I agree with the findings as stated.   Performed and dictated at [facility].   MACRO: None.   Signed by: Harjinder Swan 10/8/2024 8:04 AM Dictation workstation:   [id number]    Lower extremity venous duplex bilateral    Result Date: 10/5/2024  Interpreted By:  Alejandro Mendes and Barbat Antonio STUDY: [facility] US LOWER EXTREMITY VENOUS DUPLEX BILATERAL;  10/5/2024 10:30 am   INDICATION: Signs/Symptoms:LLE pain and edema c/f DVT.   ,R60.0 Localized edema   COMPARISON: None.   ACCESSION NUMBER(S): [id number]   ORDERING CLINICIAN: ALEXANDRA ARRIAGA   TECHNIQUE: Vascular ultrasound  of the bilateral lower extremities was performed. Real-time compression views as well as Gray scale, color Doppler and spectral Doppler waveform analysis was performed.   FINDINGS: Evaluation of the visualized portions of the bilateral common femoral vein, proximal, mid, and distal femoral vein, and popliteal vein were performed.  Evaluation of the visualized portions of the  posterior tibial and peroneal veins were also performed.   Limitations:  Patient body habitus and lower extremity edema. All findings are reported within these limitations.   The evaluated veins demonstrate normal compressibility. There is intact venous flow demonstrating normal respiratory variability and normal augmentation of flow with calf compression. Therefore, there is no ultrasonographic evidence for deep vein thrombosis within the evaluated veins.   Respiratory variation and augmentation to calf pressure was noted.       No sonographic evidence for deep vein thrombosis within the evaluated veins of the bilateral lower extremity.   I personally reviewed the images/study and I agree with the findings as stated by Christopher Soriano MD. This study was interpreted at [facility], [place], [place]   MACRO: None   Signed by: Alejandro Mendes 10/5/2024 2:35 PM Dictation workstation:   [id number]       Assessment/Plan        Assessment & Plan  Sickle cell anemia with pain (Multi)    Sickle cell disease without crisis (Multi)    ASSESSMENT: 54 y.o. 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 Received R groin non-tunneled CVC 11/1 with IR, s/p exchange transfusion 11/1 that went well. Endorsing some sharp chest pain  after the transfusion. Patient's sickle cell crisis pain usually manifests in shoulders and chest, perhaps experiencing mild pain crisis with the exchange transfusion as the trigger. This is supported by her slightly elevated Tbili/Dbili thought LDH and retic are normal. Will increase home PO oxy dosing for now. Course also c/b CHARLES, will hold lasix and stop colchicine (was not to be continued on prior admission), believe likely pre-renal iso exchange transfusion. PT/OT recommending home care. Will discharge with resolution of CHARLES, leukocytosis (likely iso transfusion), sub-therapeutic INR, improvement in chest pain.     PLAN:     #HbSC SCD  #?Mild pain crisis after exchange transfusion  #Chest pain  - receives exchange transfusions q6 weeks, most recently 11/1 via IR guided R groin non-tunneled CVC (11/1)  - 11/2 hemolysis labs: retic 6.8 (9.3), Tbili 2.2 (1.6),  (366), trend daily. Overall stable but Tbili slightly worse after transfusion   - no leukocytosis on admission. WBC 14.8 11/2 from 9. Likely iso transfusion yesterday, will ctm. No other infectious symptoms at this time  - HbS 24.3 (H)  - chest pain: started endorsing after exchange transfusion. EKG NSR, Trop < 3, CXR without acute process. Tried Tums without improvement. 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 Whaley's recent note:  Oral tylenol 650 mg every 6 hours prn for mild to moderate pain   Oral oxycodone 10-20 mg every 4-6 hours as needed for moderate to severe and breakthrough pain -> increased oxy to 15 mg for moderate/severe pain and oxy to 30 mg for breakthrough pain to treat ?mild sickle cell crisis that is likely being manifested with as chest pain (11/2-)  Non pharmacologic methods of pain control   Flexeril 10 mg q3h prn as needed as a muscle relaxant  Avoid ER oxycontin as contributes to AMS  -  Compression stockings for swelling of RLE   - Continue 2L night time oxygen   - Daily folic acid 1 mg   - Doc senna 2 tab BID and miralax prn for chronic constipation   - zofran prn for nausea     #DVT, PE  #SVC thrombus  - on no home O2, has RLE swelling but this is chronic. No LLE swelling  - on home coumadin 5 mg, held 10/31 dose given supratherapeutic INR on admission at 4. Also gave x1 IV vit K 2.5 mg (improved to 2.9)  - s/p coumadin 4 mg -> INR 11/2 1.7 -> will resume coumadin 5 mg home dosing 11/2  - daily coags     #SVT  #pHTN  -EKG 11/2 NSR  -metop 12.5 BID  -lasix 20 every other day (holding iso CHARLES below)    #CHARLES  -Cr bump .94 -> 1.48 after exchange transfusion, likely pre-renal  -bladder scan, UA  -HOLD home lasix  -discontinued colchicine, which should be stopped on discharge, not supposed to be on this chronically     F: prn  E: K>4, Mg>2  N: regular  A: pIV. R groin CVC (11/1- and will plan to pull today)  DVT PPx: coumadin  GI PPx: no indication  - Derm FUV 11/7, Sickle Cell FUV 11/18, Cardiology FUV 2/13     CODE STATUS: FULL (confirmed on admission)   SURROGATE DECISION MAKER: Tati (mom) [phone]              Lisa Giles MD

## 2024-11-02 NOTE — CARE PLAN
The clinical goals for the shift include Pt will remain HDS and VSS throughout shift 11/2/24 by 0700.      Problem: Safety - Adult  Goal: Free from fall injury  Outcome: Progressing     Problem: Discharge Planning  Goal: Discharge to home or other facility with appropriate resources  Outcome: Progressing     Problem: Chronic Conditions and Co-morbidities  Goal: Patient's chronic conditions and co-morbidity symptoms are monitored and maintained or improved  Outcome: Progressing     Problem: Fall/Injury  Goal: Not fall by end of shift  Outcome: Progressing  Goal: Be free from injury by end of the shift  Outcome: Progressing  Goal: Verbalize understanding of personal risk factors for fall in the hospital  Outcome: Progressing  Goal: Verbalize understanding of risk factor reduction measures to prevent injury from fall in the home  Outcome: Progressing  Goal: Use assistive devices by end of the shift  Outcome: Progressing  Goal: Pace activities to prevent fatigue by end of the shift  Outcome: Progressing

## 2024-11-03 ENCOUNTER — APPOINTMENT (OUTPATIENT)
Dept: RADIOLOGY | Facility: HOSPITAL | Age: 54
DRG: 812 | End: 2024-11-03
Payer: COMMERCIAL

## 2024-11-03 ENCOUNTER — OFFICE VISIT (OUTPATIENT)
Dept: HEMATOLOGY/ONCOLOGY | Facility: HOSPITAL | Age: 54
DRG: 812 | End: 2024-11-03
Payer: COMMERCIAL

## 2024-11-03 VITALS
DIASTOLIC BLOOD PRESSURE: 64 MMHG | TEMPERATURE: 97.3 F | SYSTOLIC BLOOD PRESSURE: 100 MMHG | HEART RATE: 84 BPM | HEIGHT: 65 IN | WEIGHT: 238.8 LBS | OXYGEN SATURATION: 95 % | BODY MASS INDEX: 39.79 KG/M2 | RESPIRATION RATE: 18 BRPM

## 2024-11-03 LAB
ALBUMIN SERPL BCP-MCNC: 2.6 G/DL (ref 3.4–5)
ALP SERPL-CCNC: 175 U/L (ref 33–110)
ALT SERPL W P-5'-P-CCNC: 15 U/L (ref 7–45)
ANION GAP SERPL CALC-SCNC: 11 MMOL/L (ref 10–20)
APTT PPP: 32 SECONDS (ref 27–38)
AST SERPL W P-5'-P-CCNC: 31 U/L (ref 9–39)
BASOPHILS # BLD AUTO: 0.08 X10*3/UL (ref 0–0.1)
BASOPHILS NFR BLD AUTO: 0.4 %
BILIRUB DIRECT SERPL-MCNC: 0.8 MG/DL (ref 0–0.3)
BILIRUB SERPL-MCNC: 1.6 MG/DL (ref 0–1.2)
BUN SERPL-MCNC: 18 MG/DL (ref 6–23)
CALCIUM SERPL-MCNC: 8.4 MG/DL (ref 8.6–10.6)
CARDIAC TROPONIN I PNL SERPL HS: 3 NG/L (ref 0–34)
CHLORIDE SERPL-SCNC: 97 MMOL/L (ref 98–107)
CHLORIDE UR-SCNC: <15 MMOL/L
CHLORIDE/CREATININE (MMOL/G) IN URINE: NORMAL
CO2 SERPL-SCNC: 28 MMOL/L (ref 21–32)
CREAT SERPL-MCNC: 2.37 MG/DL (ref 0.5–1.05)
CREAT UR-MCNC: 283 MG/DL (ref 20–320)
EGFRCR SERPLBLD CKD-EPI 2021: 24 ML/MIN/1.73M*2
EOSINOPHIL # BLD AUTO: 1.09 X10*3/UL (ref 0–0.7)
EOSINOPHIL NFR BLD AUTO: 6.1 %
ERYTHROCYTE [DISTWIDTH] IN BLOOD BY AUTOMATED COUNT: 18.9 % (ref 11.5–14.5)
GLUCOSE SERPL-MCNC: 69 MG/DL (ref 74–99)
HCT VFR BLD AUTO: 27.2 % (ref 36–46)
HGB BLD-MCNC: 9.3 G/DL (ref 12–16)
HGB RETIC QN: 28 PG (ref 28–38)
HOLD SPECIMEN: NORMAL
IMM GRANULOCYTES # BLD AUTO: 0.11 X10*3/UL (ref 0–0.7)
IMM GRANULOCYTES NFR BLD AUTO: 0.6 % (ref 0–0.9)
IMMATURE RETIC FRACTION: 28.9 %
INR PPP: 1.5 (ref 0.9–1.1)
LDH SERPL L TO P-CCNC: 305 U/L (ref 84–246)
LYMPHOCYTES # BLD AUTO: 3.82 X10*3/UL (ref 1.2–4.8)
LYMPHOCYTES NFR BLD AUTO: 21.4 %
MAGNESIUM SERPL-MCNC: 2.05 MG/DL (ref 1.6–2.4)
MCH RBC QN AUTO: 30.2 PG (ref 26–34)
MCHC RBC AUTO-ENTMCNC: 34.2 G/DL (ref 32–36)
MCV RBC AUTO: 88 FL (ref 80–100)
MONOCYTES # BLD AUTO: 1.63 X10*3/UL (ref 0.1–1)
MONOCYTES NFR BLD AUTO: 9.1 %
NEUTROPHILS # BLD AUTO: 11.16 X10*3/UL (ref 1.2–7.7)
NEUTROPHILS NFR BLD AUTO: 62.4 %
NRBC BLD-RTO: 1.3 /100 WBCS (ref 0–0)
PHOSPHATE SERPL-MCNC: 4.4 MG/DL (ref 2.5–4.9)
PLATELET # BLD AUTO: 225 X10*3/UL (ref 150–450)
POTASSIUM SERPL-SCNC: 4.5 MMOL/L (ref 3.5–5.3)
POTASSIUM UR-SCNC: 34 MMOL/L
POTASSIUM/CREAT UR-RTO: 12 MMOL/G CREAT
PROT SERPL-MCNC: 6.4 G/DL (ref 6.4–8.2)
PROTHROMBIN TIME: 16.9 SECONDS (ref 9.8–12.8)
RBC # BLD AUTO: 3.08 X10*6/UL (ref 4–5.2)
RETICS #: 0.29 X10*6/UL (ref 0.02–0.08)
RETICS/RBC NFR AUTO: 9.3 % (ref 0.5–2)
SODIUM SERPL-SCNC: 131 MMOL/L (ref 136–145)
SODIUM UR-SCNC: 13 MMOL/L
SODIUM/CREAT UR-RTO: 5 MMOL/G CREAT
WBC # BLD AUTO: 17.9 X10*3/UL (ref 4.4–11.3)

## 2024-11-03 PROCEDURE — 82248 BILIRUBIN DIRECT: CPT

## 2024-11-03 PROCEDURE — 85025 COMPLETE CBC W/AUTO DIFF WBC: CPT

## 2024-11-03 PROCEDURE — 84100 ASSAY OF PHOSPHORUS: CPT

## 2024-11-03 PROCEDURE — 2500000001 HC RX 250 WO HCPCS SELF ADMINISTERED DRUGS (ALT 637 FOR MEDICARE OP)

## 2024-11-03 PROCEDURE — 36415 COLL VENOUS BLD VENIPUNCTURE: CPT

## 2024-11-03 PROCEDURE — 84300 ASSAY OF URINE SODIUM: CPT

## 2024-11-03 PROCEDURE — 83615 LACTATE (LD) (LDH) ENZYME: CPT

## 2024-11-03 PROCEDURE — 93005 ELECTROCARDIOGRAM TRACING: CPT

## 2024-11-03 PROCEDURE — 85610 PROTHROMBIN TIME: CPT

## 2024-11-03 PROCEDURE — 83735 ASSAY OF MAGNESIUM: CPT

## 2024-11-03 PROCEDURE — 82436 ASSAY OF URINE CHLORIDE: CPT

## 2024-11-03 PROCEDURE — 2500000004 HC RX 250 GENERAL PHARMACY W/ HCPCS (ALT 636 FOR OP/ED): Performed by: INTERNAL MEDICINE

## 2024-11-03 PROCEDURE — 2500000001 HC RX 250 WO HCPCS SELF ADMINISTERED DRUGS (ALT 637 FOR MEDICARE OP): Performed by: INTERNAL MEDICINE

## 2024-11-03 PROCEDURE — 71045 X-RAY EXAM CHEST 1 VIEW: CPT

## 2024-11-03 PROCEDURE — 85045 AUTOMATED RETICULOCYTE COUNT: CPT

## 2024-11-03 PROCEDURE — 85730 THROMBOPLASTIN TIME PARTIAL: CPT

## 2024-11-03 PROCEDURE — 84484 ASSAY OF TROPONIN QUANT: CPT | Performed by: INTERNAL MEDICINE

## 2024-11-03 PROCEDURE — 80048 BASIC METABOLIC PNL TOTAL CA: CPT

## 2024-11-03 PROCEDURE — 2500000004 HC RX 250 GENERAL PHARMACY W/ HCPCS (ALT 636 FOR OP/ED)

## 2024-11-03 PROCEDURE — 71045 X-RAY EXAM CHEST 1 VIEW: CPT | Performed by: RADIOLOGY

## 2024-11-03 PROCEDURE — 1200000003 HC ONCOLOGY  ROOM WITH TELEMETRY DAILY

## 2024-11-03 PROCEDURE — 93010 ELECTROCARDIOGRAM REPORT: CPT | Performed by: INTERNAL MEDICINE

## 2024-11-03 PROCEDURE — 2500000002 HC RX 250 W HCPCS SELF ADMINISTERED DRUGS (ALT 637 FOR MEDICARE OP, ALT 636 FOR OP/ED)

## 2024-11-03 PROCEDURE — 99233 SBSQ HOSP IP/OBS HIGH 50: CPT

## 2024-11-03 RX ORDER — OXYCODONE HYDROCHLORIDE 5 MG/1
5 TABLET ORAL ONCE
Status: ACTIVE | OUTPATIENT
Start: 2024-11-04

## 2024-11-03 RX ORDER — HYDROMORPHONE HYDROCHLORIDE 1 MG/ML
0.4 INJECTION, SOLUTION INTRAMUSCULAR; INTRAVENOUS; SUBCUTANEOUS ONCE
Status: COMPLETED | OUTPATIENT
Start: 2024-11-03 | End: 2024-11-03

## 2024-11-03 RX ORDER — OXYCODONE AND ACETAMINOPHEN 5; 325 MG/1; MG/1
1 TABLET ORAL ONCE
Status: DISCONTINUED | OUTPATIENT
Start: 2024-11-04 | End: 2024-11-03

## 2024-11-03 RX ORDER — HYDROXYZINE HYDROCHLORIDE 25 MG/1
25 TABLET, FILM COATED ORAL ONCE
Status: COMPLETED | OUTPATIENT
Start: 2024-11-03 | End: 2024-11-03

## 2024-11-03 ASSESSMENT — COGNITIVE AND FUNCTIONAL STATUS - GENERAL
DRESSING REGULAR LOWER BODY CLOTHING: A LITTLE
CLIMB 3 TO 5 STEPS WITH RAILING: A LITTLE
CLIMB 3 TO 5 STEPS WITH RAILING: A LITTLE
WALKING IN HOSPITAL ROOM: A LITTLE
WALKING IN HOSPITAL ROOM: A LITTLE
DAILY ACTIVITIY SCORE: 24
MOBILITY SCORE: 22
DAILY ACTIVITIY SCORE: 22
MOBILITY SCORE: 22
HELP NEEDED FOR BATHING: A LITTLE

## 2024-11-03 ASSESSMENT — PAIN - FUNCTIONAL ASSESSMENT
PAIN_FUNCTIONAL_ASSESSMENT: 0-10

## 2024-11-03 ASSESSMENT — PAIN SCALES - GENERAL
PAINLEVEL_OUTOF10: 6
PAINLEVEL_OUTOF10: 6
PAINLEVEL_OUTOF10: 8
PAINLEVEL_OUTOF10: 6
PAINLEVEL_OUTOF10: 8
PAINLEVEL_OUTOF10: 9

## 2024-11-03 NOTE — PROGRESS NOTES
Senait Narvaez is a 54 y.o. female on day 3 of admission presenting with Sickle cell anemia with pain (Multi).    Subjective   No acute events overnight. Patient denied, nausea, vomiting, fever, and cough.   Chest pain resolved. Left ankle pain is stable. Chest pain is resolved.  One episode of SVT, self resolved.  Transient hypoxia overnight iso of no CPAP       Objective     Last Recorded Vitals  /71 (BP Location: Left arm)   Pulse 84   Temp 36 °C (96.8 °F) (Temporal)   Resp 18   Wt 108 kg (238 lb 12.8 oz)   SpO2 100%   Intake/Output last 3 Shifts:    Intake/Output Summary (Last 24 hours) at 11/3/2024 0957  Last data filed at 11/3/2024 0851  Gross per 24 hour   Intake 980 ml   Output 350 ml   Net 630 ml       Admission Weight  Weight: 108 kg (238 lb 12.8 oz) (10/31/24 1100)    Daily Weight  10/31/24 : 108 kg (238 lb 12.8 oz)    Image Results  XR chest 1 view  Narrative: Interpreted By:  Carlos White and Hofer Lindsay   STUDY:  XR CHEST 1 VIEW;  11/2/2024 9:16 am      INDICATION:  Signs/Symptoms:Chest pain.      COMPARISON:  Chest radiograph 09/27/2024      ACCESSION NUMBER(S):  [id number]      ORDERING CLINICIAN:  JOSE G PINEDA      FINDINGS:  AP radiograph of the chest was provided.          CARDIOMEDIASTINAL SILHOUETTE:  Cardiomediastinal silhouette is stable in size and configuration.      LUNGS:  No focal consolidation. Persistent hazy opacity in the left lower  lung with blunting of the left costophrenic angle. No evidence of  pneumothorax.      ABDOMEN:  No remarkable upper abdominal findings.      BONES:  No acute osseous changes.      Impression: 1. Hazy opacity projecting over the left lower lung and costophrenic  angle is similar to prior exam and may be projectional though small  left pleural effusion with left basilar atelectasis/consolidation is  not excluded.  2. Remaining lungs are clear.      I personally reviewed the images/study and resident's interpretation  and I agree with the  findings as stated by Toya Coreas MD (resident  radiologist). This study was analyzed and interpreted at [facility], [place], [place].      MACRO:  None      Signed by: Carlos White 11/2/2024 2:07 PM  Dictation workstation:   [id number]    PHYSICAL EXAM:  General: conversant, appears stated age, laying in bed in NAD  HEENT: PERRL, no scleral icterus or conjunctivitis  Chest: ctab, not on supp oxygen, no tenderness to palpation of upper chest  Cardiac: RRR, normal s1, s2, no JVD  Abdomen: soft, ND, NT  EXT: RLE with trace swelling, LLE without swelling. , no evidence of hematoma, erythema or swelling  Neuro: Aox3, follows commands  Psych: coherent thought process, appropriate mood and affect    Relevant Results  Scheduled medications  alum-mag hydroxide-simeth, 20 mL, oral, 4x daily  ascorbic acid, 500 mg, oral, Daily  folic acid, 1 mg, oral, Daily  [Held by provider] furosemide, 20 mg, oral, Every other day  metoprolol tartrate, 12.5 mg, oral, BID  sennosides-docusate sodium, 2 tablet, oral, q12h  triamcinolone, , Topical, BID  warfarin, 5 mg, oral, Daily  white petrolatum, 2 Application, Topical, BID      Continuous medications     PRN medications  PRN medications: acetaminophen, albuterol, calcium carbonate, cyclobenzaprine, fluticasone, ondansetron, oxyCODONE, oxyCODONE, polyethylene glycol    Results for orders placed or performed during the hospital encounter of 10/31/24 (from the past 24 hours)   Urinalysis with Reflex Culture and Microscopic   Result Value Ref Range    Color, Urine Yellow Light-Yellow, Yellow, Dark-Yellow    Appearance, Urine Turbid (N) Clear    Specific Gravity, Urine 1.022 1.005 - 1.035    pH, Urine 5.5 5.0, 5.5, 6.0, 6.5, 7.0, 7.5, 8.0    Protein, Urine 30 (1+) (A) NEGATIVE, 10 (TRACE), 20 (TRACE) mg/dL    Glucose, Urine Normal Normal mg/dL    Blood, Urine NEGATIVE NEGATIVE    Ketones, Urine NEGATIVE NEGATIVE mg/dL    Bilirubin, Urine NEGATIVE  NEGATIVE    Urobilinogen, Urine 2 (1+) (A) Normal mg/dL    Nitrite, Urine NEGATIVE NEGATIVE    Leukocyte Esterase, Urine NEGATIVE NEGATIVE   Extra Urine Gray Tube   Result Value Ref Range    Extra Tube Hold for add-ons.    Urinalysis Microscopic   Result Value Ref Range    WBC, Urine 1-5 1-5, NONE /HPF    RBC, Urine 1-2 NONE, 1-2, 3-5 /HPF    Squamous Epithelial Cells, Urine 10-25 (FEW) Reference range not established. /HPF    Bacteria, Urine 1+ (A) NONE SEEN /HPF    Mucus, Urine FEW Reference range not established. /LPF    Hyaline Casts, Urine 2+ (A) NONE /LPF   CBC and Auto Differential   Result Value Ref Range    WBC 17.9 (H) 4.4 - 11.3 x10*3/uL    nRBC 1.3 (H) 0.0 - 0.0 /100 WBCs    RBC 3.08 (L) 4.00 - 5.20 x10*6/uL    Hemoglobin 9.3 (L) 12.0 - 16.0 g/dL    Hematocrit 27.2 (L) 36.0 - 46.0 %    MCV 88 80 - 100 fL    MCH 30.2 26.0 - 34.0 pg    MCHC 34.2 32.0 - 36.0 g/dL    RDW 18.9 (H) 11.5 - 14.5 %    Platelets 225 150 - 450 x10*3/uL    Neutrophils % 62.4 40.0 - 80.0 %    Immature Granulocytes %, Automated 0.6 0.0 - 0.9 %    Lymphocytes % 21.4 13.0 - 44.0 %    Monocytes % 9.1 2.0 - 10.0 %    Eosinophils % 6.1 0.0 - 6.0 %    Basophils % 0.4 0.0 - 2.0 %    Neutrophils Absolute 11.16 (H) 1.20 - 7.70 x10*3/uL    Immature Granulocytes Absolute, Automated 0.11 0.00 - 0.70 x10*3/uL    Lymphocytes Absolute 3.82 1.20 - 4.80 x10*3/uL    Monocytes Absolute 1.63 (H) 0.10 - 1.00 x10*3/uL    Eosinophils Absolute 1.09 (H) 0.00 - 0.70 x10*3/uL    Basophils Absolute 0.08 0.00 - 0.10 x10*3/uL   Hepatic Function Panel   Result Value Ref Range    Albumin 2.6 (L) 3.4 - 5.0 g/dL    Bilirubin, Total 1.6 (H) 0.0 - 1.2 mg/dL    Bilirubin, Direct 0.8 (H) 0.0 - 0.3 mg/dL    Alkaline Phosphatase 175 (H) 33 - 110 U/L    ALT 15 7 - 45 U/L    AST 31 9 - 39 U/L    Total Protein 6.4 6.4 - 8.2 g/dL   Magnesium   Result Value Ref Range    Magnesium 2.05 1.60 - 2.40 mg/dL   Reticulocytes   Result Value Ref Range    Retic % 9.3 (H) 0.5 - 2.0 %     Retic Absolute 0.287 (H) 0.018 - 0.083 x10*6/uL    Reticulocyte Hemoglobin 28 28 - 38 pg    Immature Retic fraction 28.9 (H) <=16.0 %   Lactate dehydrogenase   Result Value Ref Range     (H) 84 - 246 U/L   Coagulation Screen   Result Value Ref Range    Protime 16.9 (H) 9.8 - 12.8 seconds    INR 1.5 (H) 0.9 - 1.1    aPTT 32 27 - 38 seconds   Phosphorus   Result Value Ref Range    Phosphorus 4.4 2.5 - 4.9 mg/dL   Basic Metabolic Panel   Result Value Ref Range    Glucose 69 (L) 74 - 99 mg/dL    Sodium 131 (L) 136 - 145 mmol/L    Potassium 4.5 3.5 - 5.3 mmol/L    Chloride 97 (L) 98 - 107 mmol/L    Bicarbonate 28 21 - 32 mmol/L    Anion Gap 11 10 - 20 mmol/L    Urea Nitrogen 18 6 - 23 mg/dL    Creatinine 2.37 (H) 0.50 - 1.05 mg/dL    eGFR 24 (L) >60 mL/min/1.73m*2    Calcium 8.4 (L) 8.6 - 10.6 mg/dL   Troponin I, High Sensitivity   Result Value Ref Range    Troponin I, High Sensitivity (CMC) 3 0 - 34 ng/L     *Note: Due to a large number of results and/or encounters for the requested time period, some results have not been displayed. A complete set of results can be found in Results Review.     XR chest 1 view    Result Date: 11/2/2024  STUDY: XR CHEST 1 VIEW;  11/2/2024 9:16 am   INDICATION: Signs/Symptoms:Chest pain.   COMPARISON: Chest radiograph 09/27/2024   ACCESSION NUMBER(S): [id number]   ORDERING CLINICIAN: JOSE G PINEDA   FINDINGS: AP radiograph of the chest was provided.     CARDIOMEDIASTINAL SILHOUETTE: Cardiomediastinal silhouette is stable in size and configuration.   LUNGS: Similar appearance of prominent perihilar lung markings. Persistent hazy opacity in the left lower lung with blunting of the left costophrenic angle. No evidence of pneumothorax.   ABDOMEN: No remarkable upper abdominal findings.   BONES: No acute osseous changes.       1.  Similar appearance of mild interstitial pulmonary edema. 2. Stable appearance of small left pleural effusion with adjacent atelectasis.   I personally  reviewed the images/study and resident's interpretation and I agree with the findings as stated by Toya Coreas MD (resident radiologist). This study was analyzed and interpreted at [facility], [place], [place].   MACRO: None     Dictation workstation:   [id number]    IR CVC nontunneled    Result Date: 11/1/2024  Interpreted By:  Bal Del Rosario,  and Frances Hopkins STUDY: IR CVC NONTUNNELED; 11/1/2024 8:36 am   INDICATION: Signs/Symptoms:History of sickle cell disease on chronic red cell exchange transfusion.   COMPARISON: IR CVC non tunneled 10/07/2024   ACCESSION NUMBER(S): [id number]   ORDERING CLINICIAN: MARIYA TRAN   TECHNIQUE: INTERVENTIONALIST(S): BAL DEL ROSARIO MD   CONSENT: The patient/patient's POA/next of kin was informed of the nature of the proposed procedure. The purposes, alternatives, risks, and benefits were explained and discussed. All questions were answered and consent was obtained.   RADIATION EXPOSURE: Fluoroscopy time: 0.1 min. Dose: 11.08 mGy. Dose Area Product (DAP): 4101   SEDATION: No sedation was given.   MEDICATION/CONTRAST: No additional   TIME OUT: A time out was performed immediately prior to procedure start with the interventional team, correctly identifying the patient name, date of birth, MRN, procedure, anatomy (including marking of site and side), patient position, procedure consent form, relevant laboratory and imaging test results, antibiotic administration, safety precautions, and procedure-specific equipment needs.   COMPLICATIONS: No immediate adverse events identified.   FINDINGS: The patient was positioned supine on the angiography table. The right  proximal thigh cutaneous tissues were prepared and draped in sterile manner.  1% lidocaine local anesthesia was instilled into the subcutaneous soft tissues at the selected access site for local anesthesia. Ultrasound images demonstrate a patent and compressible right  common femoral vein.  Utilizing direct ultrasound guidance and micropuncture/Seldinger technique, the  right  common femoral vein was accessed. An ultrasound digital spot image was acquired and stored on the  PACS. After confirmation of location, a 018 [place]-Mandrel guidewire was introduced and advanced into the inferior vena cava utilizing intermittent fluoroscopy.   The needle was removed with the guidewire left in place and exchanged for a 5-[place] coaxial dilator.  The inner dilator and wire were removed and a J-tipped 035 guidewire was introduced through the access sheath.  The skin tract was dilated with successive increase in size of vascular dilators under direct fluoroscopic visualization.   Subsequently, a temporary 13 [place] x 24 cm Trialysis catheter was advanced with its tip overlying the cavoatrial junction under direct fluoroscopic guidance.  The catheter ports were aspirated and flushed with normal saline and charged with heparin.  The external portions of the catheter were secured in place with sterile suture dressings.   The patient tolerated the procedure without complication.       1.  Technically uneventful placement of a   right  common femoral temporary Trialysis catheter under direct ultrasound and fluoroscopic guidance - optimal catheter tip position at the right atrial inferior vena caval junction and the catheter is ready for utilization.   I was present for and/or performed the critical portions of the procedure and immediately available throughout the entire procedure.   I personally reviewed the image(s) / study and resident interpretation. I agree with the findings as stated.   Performed and dictated at [facility].   Signed by: Arnoldo Noonan 11/1/2024 11:08 AM Dictation workstation:   [id number]    BI mammo bilateral diagnostic tomosynthesis    Result Date: 10/16/2024  Interpreted By:  Debbie Magallon and Liller Gregory STUDY: BI MAMMO BILATERAL DIAGNOSTIC  TOMOSYNTHESIS; BI US BREAST LIMITED LEFT;  10/16/2024 10:21 am; 10/16/2024 10:59 am   ACCESSION NUMBER(S): [id number]; [id number]   ORDERING CLINICIAN: ANGELA MEJIA   INDICATION: Patient presents with skin hardening and discoloration of the left breast. History of sickle cell disease   COMPARISON: Mammogram 08/16/2023, 01/18/2021, 12/30/2019, breast ultrasound 11/27/2023.   FINDINGS: MAMMOGRAPHY: 2D and tomosynthesis images were reviewed at 1 mm slice thickness.   Density:  There are scattered areas of fibroglandular density.   There is marked asymmetric diffuse left breast skin thickening and trabecular thickening. No suspicious masses or calcifications are identified in either breast. A slightly prominent lymph node is again seen in the lower left axilla, similar compared to previous mammograms.   ULTRASOUND: Targeted ultrasound was performed of the entire left breast by a registered sonographer with elastography. There is diffuse skin thickening of the left breast measuring up to 0.7 cm and diffuse parenchymal edema, correlating with mammographic findings. No focal abnormality is identified on the ultrasound examination.       1. Marked left breast diffuse edema, suggestive of vaso-occlusive etiology. Clinical correlation and management till resolution is recommended. 2. No mammographic or targeted sonographic evidence of malignancy.   BI-RADS CATEGORY: BI-RADS Category:  2 Benign. Recommendation:  Clinical follow-up. Recommended Date:  Immediate. Laterality:  Left.   For any future breast imaging appointments, please call [phone]).   I personally reviewed the images/study and I agree with the findings as stated above by resident physician, Dr. Quan Bergman.   MACRO: None   Signed by: Debbie Magallon 10/16/2024 11:41 AM Dictation workstation:   [id number]    BI US breast limited left    Result Date: 10/16/2024  Interpreted By:  Debbie Magallon and Liller Gregory STUDY: BI  MAMMO BILATERAL DIAGNOSTIC TOMOSYNTHESIS; BI US BREAST LIMITED LEFT;  10/16/2024 10:21 am; 10/16/2024 10:59 am   ACCESSION NUMBER(S): [id number]; [id number]   ORDERING CLINICIAN: ANGELA MEJIA   INDICATION: Patient presents with skin hardening and discoloration of the left breast. History of sickle cell disease   COMPARISON: Mammogram 08/16/2023, 01/18/2021, 12/30/2019, breast ultrasound 11/27/2023.   FINDINGS: MAMMOGRAPHY: 2D and tomosynthesis images were reviewed at 1 mm slice thickness.   Density:  There are scattered areas of fibroglandular density.   There is marked asymmetric diffuse left breast skin thickening and trabecular thickening. No suspicious masses or calcifications are identified in either breast. A slightly prominent lymph node is again seen in the lower left axilla, similar compared to previous mammograms.   ULTRASOUND: Targeted ultrasound was performed of the entire left breast by a registered sonographer with elastography. There is diffuse skin thickening of the left breast measuring up to 0.7 cm and diffuse parenchymal edema, correlating with mammographic findings. No focal abnormality is identified on the ultrasound examination.       1. Marked left breast diffuse edema, suggestive of vaso-occlusive etiology. Clinical correlation and management till resolution is recommended. 2. No mammographic or targeted sonographic evidence of malignancy.   BI-RADS CATEGORY: BI-RADS Category:  2 Benign. Recommendation:  Clinical follow-up. Recommended Date:  Immediate. Laterality:  Left.   For any future breast imaging appointments, please call [phone]).   I personally reviewed the images/study and I agree with the findings as stated above by resident physician, Dr. Quan Bergman.   MACRO: None   Signed by: Debbie Magallon 10/16/2024 11:41 AM Dictation workstation:   [id number]    CT ankle left with IV contrast    Result Date: 10/14/2024  Interpreted By:  Vivek Ji,  and  Hermilo Bernabe STUDY: CT ANKLE LEFT W IV CONTRAST;  10/13/2024 4:00 pm   INDICATION: Signs/Symptoms:sickle cell, increased ankle pain.   COMPARISON: Radiographs 10/08/2024.   ACCESSION NUMBER(S): [id number]   ORDERING CLINICIAN: DARIN ANTONIO   TECHNIQUE: CT imaging of the  left ankle was obtained  with administration of intravenous contrast medium. Coronal and sagittal reformatted images were performed.   FINDINGS: OSSEOUS STRUCTURES: No acute fractures. Remote fracture deformity of the distal fibula metaphysis. Joint alignment is maintained. Mild subchondral sclerotic changes and joint space narrowing of the midfoot. There is mild sclerotic and subchondral cystic changes of the tibiotalar joint. No evidence of serpiginous sclerosis or lucency to definitively suggest avascular necrosis.   SOFT TISSUES: Diffuse soft tissue edema of the distal lower extremity and ankle without focal fluid collection or abnormal enhancement. The visualized ligamentous and tendinous structures are intact.       1. Diffuse nonspecific edema of the distal lower extremity and ankle without focal fluid collection or abnormal enhancement. Findings may represent vascular congestion, and cellulitis is felt less likely with the lack of enhancement but is not entirely excluded. No acute osseous findings. 2. No osseous manifestations of avascular necrosis within the limitations of CT modality. 3. Mild degenerative changes of the mid and hindfoot.     MACRO: None   Signed by: Vivek Ji 10/14/2024 11:09 AM Dictation workstation:   [id number]    XR ankle left 3+ views    Result Date: 10/9/2024  Interpreted By:  Srinivasa Akhtar, STUDY: XR ANKLE LEFT 3+ VIEWS; ;  10/8/2024 6:50 pm   INDICATION: Signs/Symptoms:sickle cell, increased left ankle pain.     COMPARISON: None.   ACCESSION NUMBER(S): [id number]   ORDERING CLINICIAN: DARIN ANTONIO   FINDINGS: Three views of the left ankle.   No acute fracture. Joint alignment is maintained. Soft  tissue edema of the ankle.       No acute fractures. Soft tissue edema of the ankle.     MACRO: None   Signed by: Srinivasa Akhtar 10/9/2024 8:48 AM Dictation workstation:   [id number]    IR CVC nontunneled    Result Date: 10/8/2024  Interpreted By:  Harjinder Swan and Stevens Alex STUDY: IR CVC NONTUNNELED;  10/7/2024 10:59 am   INDICATION: Signs/Symptoms:apheresis line placement for RBCEx (typically femoral for her).   COMPARISON: None.   ACCESSION NUMBER(S): [id number]   ORDERING CLINICIAN: ALEXANDRA ARRIAGA   TECHNIQUE: INTERVENTIONALIST(S): BLAYNE Red MD   CONSENT: The patient/patient's POA/next of kin was informed of the nature of the proposed procedure. The purposes, alternatives, risks, and benefits were explained and discussed. All questions were answered and consent was obtained.   RADIATION EXPOSURE: Fluoroscopy time: 0.1 min Dose: 3.34 mGy Dose Area Product (DAP): 714 mGy*cm^2   SEDATION: Moderate conscious IV sedation services (supervision of administration, induction, and maintenance) were provided by the physician performing the procedure with intravenous fentanyl 50mcg and versed 1mg. The physician was assisted by an independent trained observer, an interventional radiology nurse, in the continuous monitoring of patient level of consciousness and physiologic status.   MEDICATION: None.   TIME OUT: A time out was performed immediately prior to procedure start with the interventional team, correctly identifying the patient name, date of birth, MRN, procedure, anatomy (including marking of site and side), patient position, procedure consent form, relevant laboratory and imaging test results, antibiotic administration, safety precautions, and procedure-specific equipment needs.   COMPLICATIONS: No immediate adverse events identified.   FINDINGS: The patient was positioned supine on the angiography table. The right groin cutaneous tissues were prepared and draped in sterile  manner. 1% lidocaine local anesthesia was instilled into the subcutaneous soft tissues at the selected access site for local anesthesia. Ultrasound images demonstrate a patent and compressible  right femoral vein. Utilizing direct ultrasound guidance and micropuncture/Seldinger technique, the  right  femoral vein was accessed. An ultrasound digital spot image was acquired and stored on the  PACS. After confirmation of location, a 018 [place]-Mandrel guidewire was introduced and advanced into the inferior vena cava utilizing intermittent fluoroscopy.   The needle was removed with the guidewire left in place and exchanged for a 5-[place] coaxial dilator.  The inner dilator and wire were removed and a J-tipped 035 guidewire was introduced through the access sheath.  The skin tract was dilated with successive increase in size of vascular dilators under direct fluoroscopic visualization.   Subsequently, a temporary 13 [place] x 24 cm catheter was advanced with its tip overlying the cavoatrial junction under direct fluoroscopic guidance.  The catheter ports were aspirated and flushed with normal saline and charged with heparin. The external portions of the catheter were secured in place with sterile suture dressings.   The patient tolerated the procedure without complication.       1. Technically successful and uncomplicated placement of a right femoral temporary Trialysis catheter under direct ultrasound and fluoroscopic guidance - optimal catheter tip position at the inferior vena cava and the catheter is ready for immediate use.   I was present for and/or performed the critical portions of the procedure and immediately available throughout the entire procedure.   I personally reviewed the image(s) / study and resident interpretation. I agree with the findings as stated.   Performed and dictated at [facility].   MACRO: None.   Signed by: Harjinder Swan 10/8/2024 8:04 AM Dictation  workstation:   [id number]    Lower extremity venous duplex bilateral    Result Date: 10/5/2024  Interpreted By:  Alejandro Mendes and Barbat Antonio STUDY: [facility] LOWER EXTREMITY VENOUS DUPLEX BILATERAL;  10/5/2024 10:30 am   INDICATION: Signs/Symptoms:LLE pain and edema c/f DVT.   ,R60.0 Localized edema   COMPARISON: None.   ACCESSION NUMBER(S): [id number]   ORDERING CLINICIAN: ALEXANDRA ARRIAGA   TECHNIQUE: Vascular ultrasound of the bilateral lower extremities was performed. Real-time compression views as well as Gray scale, color Doppler and spectral Doppler waveform analysis was performed.   FINDINGS: Evaluation of the visualized portions of the bilateral common femoral vein, proximal, mid, and distal femoral vein, and popliteal vein were performed.  Evaluation of the visualized portions of the  posterior tibial and peroneal veins were also performed.   Limitations:  Patient body habitus and lower extremity edema. All findings are reported within these limitations.   The evaluated veins demonstrate normal compressibility. There is intact venous flow demonstrating normal respiratory variability and normal augmentation of flow with calf compression. Therefore, there is no ultrasonographic evidence for deep vein thrombosis within the evaluated veins.   Respiratory variation and augmentation to calf pressure was noted.       No sonographic evidence for deep vein thrombosis within the evaluated veins of the bilateral lower extremity.   I personally reviewed the images/study and I agree with the findings as stated by Christopher Soriano MD. This study was interpreted at [facility], [place], [place]   MACRO: None   Signed by: Alejandro Mendes 10/5/2024 2:35 PM Dictation workstation:   [id number]       Assessment/Plan        Assessment & Plan  Sickle cell anemia with pain (Multi)    Sickle cell disease without crisis (Multi)    ASSESSMENT: 54 y.o. 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 Received R groin non-tunneled CVC 11/1 with IR, s/p exchange transfusion 11/1 that went well. Endorsing some sharp chest pain after the transfusion. Patient's sickle cell crisis pain usually manifests in shoulders and chest, perhaps experiencing mild pain crisis with the exchange transfusion as the trigger. This is supported by her slightly elevated Tbili/Dbili thought LDH and retic are normal. Will increase home PO oxy dosing for now. Course also c/b CHARLES, will hold lasix and stop colchicine (was not to be continued on prior admission), believe likely pre-renal iso exchange transfusion. PT/OT recommending home care. Will discharge with resolution of CHARLES, leukocytosis (likely iso transfusion), sub-therapeutic INR, improvement in chest pain.     PLAN:  - holding lasix iso of CHARLES  - urine lytes ordered  - INR 1.5 today, will continue coumadin 5mg, will not increase dose at this time. IF inr still subtheraputic tomorrow might consider increasing dose to 7.5mg.  - LR 1L today for CHARLES     #HbSC SCD  #?Mild pain crisis after exchange transfusion  #Chest pain  - receives exchange transfusions q6 weeks, most recently 11/1 via IR guided R groin non-tunneled CVC (11/1)  - 11/2 hemolysis labs: retic 6.8 (9.3), Tbili 2.2 (1.6),  (366), trend daily. Overall stable but Tbili slightly worse after transfusion   - no leukocytosis on admission. WBC 14.8 11/2 from 9. Likely iso transfusion yesterday, will ctm. No other infectious symptoms at this time  - HbS 24.3 (H)  - chest pain: started endorsing after exchange transfusion. EKG NSR, Trop < 3, CXR without acute process. Tried Tums without improvement. Suspect this is  mild manifestation of sickle cell crisis (pt usually manifests with pain in shoulders and chest), will increase oxy dosing per below  - pain regimen per Dr. Whaley's recent note:  Oral tylenol 650 mg every 6 hours prn for mild to moderate pain   Oral oxycodone 10-20 mg every 4-6 hours as needed for moderate to severe and breakthrough pain -> increased oxy to 15 mg for moderate/severe pain and oxy to 30 mg for breakthrough pain to treat ?mild sickle cell crisis that is likely being manifested with as chest pain (11/2-)  Non pharmacologic methods of pain control   Flexeril 10 mg q3h prn as needed as a muscle relaxant  Avoid ER oxycontin as contributes to AMS  - Compression stockings for swelling of RLE   - Continue 2L night time oxygen   - Daily folic acid 1 mg   - Doc senna 2 tab BID and miralax prn for chronic constipation   - zofran prn for nausea     #DVT, PE  #SVC thrombus  - on no home O2, has RLE swelling but this is chronic. No LLE swelling  - on home coumadin 5 mg, held 10/31 dose given supratherapeutic INR on admission at 4. Also gave x1 IV vit K 2.5 mg (improved to 2.9)  - s/p coumadin 4 mg -> INR 11/2 1.7 -> will resume coumadin 5 mg home dosing 11/2  - daily coags  - INR 1.5 today, will continue coumadin 5mg, will not increase dose at this time. IF inr still subtheraputic tomorrow might consider increasing dose to 7.5mg.     #SVT  #pHTN  -EKG 11/2 NSR  -metop 12.5 BID  -lasix 20 every other day (holding iso CHARLES below)    #CHARLES  -Cr bump .94 -> 1.48-> >2 after exchange transfusion, likely pre-renal  -bladder scan, UA, are unremarkable  - urine lytes ordered  - will bolus LR today  -HOLD home lasix  -discontinued colchicine, which should be stopped on discharge, not supposed to be on this chronically     F: prn  E: K>4, Mg>2  N: regular  A: pIV. R groin CVC (11/1- and will plan to pull today)  DVT PPx: coumadin  GI PPx: no indication  - Derm FUV 11/7, Sickle Cell FUV 11/18, Cardiology FUV 2/13     CODE STATUS:  FULL (confirmed on admission)   SURROGATE DECISION MAKER: Tati (mom) [phone]              Ruddy Cope MD

## 2024-11-03 NOTE — CARE PLAN
Problem: Safety - Adult  Goal: Free from fall injury  Outcome: Progressing     Problem: Discharge Planning  Goal: Discharge to home or other facility with appropriate resources  Outcome: Progressing     Problem: Chronic Conditions and Co-morbidities  Goal: Patient's chronic conditions and co-morbidity symptoms are monitored and maintained or improved  Outcome: Progressing     Problem: Fall/Injury  Goal: Not fall by end of shift  Outcome: Progressing  Goal: Be free from injury by end of the shift  Outcome: Progressing  Goal: Verbalize understanding of personal risk factors for fall in the hospital  Outcome: Progressing  Goal: Verbalize understanding of risk factor reduction measures to prevent injury from fall in the home  Outcome: Progressing  Goal: Use assistive devices by end of the shift  Outcome: Progressing  Goal: Pace activities to prevent fatigue by end of the shift  Outcome: Progressing       The clinical goals for the shift include pt will rate pain below 7/10 throughout shift    Over the shift, the patient remained safe and free from injury. Had ongoing chest pain throughout shift, medicated PRN. Had one short run of SVT to 170s that self resolved, team aware and no new interventions placed. Vitals remained stable otherwise.

## 2024-11-04 VITALS
SYSTOLIC BLOOD PRESSURE: 113 MMHG | BODY MASS INDEX: 39.79 KG/M2 | WEIGHT: 238.8 LBS | RESPIRATION RATE: 18 BRPM | DIASTOLIC BLOOD PRESSURE: 62 MMHG | OXYGEN SATURATION: 97 % | HEART RATE: 89 BPM | TEMPERATURE: 98.2 F | HEIGHT: 65 IN

## 2024-11-04 PROBLEM — D57.1 SICKLE CELL DISEASE WITHOUT CRISIS (MULTI): Status: RESOLVED | Noted: 2024-10-31 | Resolved: 2024-11-04

## 2024-11-04 PROBLEM — D57.00 SICKLE CELL ANEMIA WITH PAIN (MULTI): Status: RESOLVED | Noted: 2024-06-10 | Resolved: 2024-11-04

## 2024-11-04 LAB
HEMOGLOBIN A2: 3.2 % (ref 2–3.5)
HEMOGLOBIN A: 48.1 % (ref 95.8–98)
HEMOGLOBIN C: 23.9 %
HEMOGLOBIN F: 0.5 % (ref 0–2)
HEMOGLOBIN IDENTIFICATION INTERPRETATION: ABNORMAL
HEMOGLOBIN S: 24.3 %
PATH REVIEW-HGB IDENTIFICATION: ABNORMAL

## 2024-11-04 PROCEDURE — 2500000001 HC RX 250 WO HCPCS SELF ADMINISTERED DRUGS (ALT 637 FOR MEDICARE OP): Performed by: INTERNAL MEDICINE

## 2024-11-04 PROCEDURE — 2500000004 HC RX 250 GENERAL PHARMACY W/ HCPCS (ALT 636 FOR OP/ED): Performed by: INTERNAL MEDICINE

## 2024-11-04 PROCEDURE — 99239 HOSP IP/OBS DSCHRG MGMT >30: CPT

## 2024-11-04 PROCEDURE — 2500000001 HC RX 250 WO HCPCS SELF ADMINISTERED DRUGS (ALT 637 FOR MEDICARE OP)

## 2024-11-04 RX ORDER — HYDROMORPHONE HYDROCHLORIDE 1 MG/ML
0.4 INJECTION, SOLUTION INTRAMUSCULAR; INTRAVENOUS; SUBCUTANEOUS ONCE
Status: COMPLETED | OUTPATIENT
Start: 2024-11-04 | End: 2024-11-04

## 2024-11-04 ASSESSMENT — COGNITIVE AND FUNCTIONAL STATUS - GENERAL
DAILY ACTIVITIY SCORE: 24
MOBILITY SCORE: 22
CLIMB 3 TO 5 STEPS WITH RAILING: A LITTLE
WALKING IN HOSPITAL ROOM: A LITTLE

## 2024-11-04 ASSESSMENT — PAIN - FUNCTIONAL ASSESSMENT
PAIN_FUNCTIONAL_ASSESSMENT: 0-10

## 2024-11-04 ASSESSMENT — PAIN SCALES - GENERAL
PAINLEVEL_OUTOF10: 7
PAINLEVEL_OUTOF10: 8
PAINLEVEL_OUTOF10: 7
PAINLEVEL_OUTOF10: 9
PAINLEVEL_OUTOF10: 6
PAINLEVEL_OUTOF10: 7

## 2024-11-04 ASSESSMENT — PAIN DESCRIPTION - LOCATION: LOCATION: CHEST

## 2024-11-04 NOTE — PROGRESS NOTES
11/01/24 1200   Discharge Planning   Living Arrangements Alone   Support Systems Parent;Children   Assistance Needed walker/rollator   Home or Post Acute Services In home services   Type of Home Care Services Home PT;Home OT   Expected Discharge Disposition Home     Care Transitions Note  11/1/24  LSW met with patient at bedside. This LSW is familiar with [facility]. Pt admitted for routine exchange for sickle cell management. [facility]. She is doing much better and would like to go home with [facility]-PT/OT if possible. Patient was working with PT when LSW left, and plan is for [facility] to return home. [facility] asked for a rollator. She has no other identified needs at this time. LSW following for discharge planning. IMM needed and signed by patient. LSW asked team to place an order for a rollator.    Order in for rollator and [facility]-PT/OT. Sent referral in [facility] to [place] for rollator, will follow.  VIRIDIANA Zamorano, LSW     11/4/24  Rollator to be delivered bedside today by [place]. [facility]-PT/OT SOC 24-48 hours. Wheelchair ride set up through [employer] to take [facility] home, address [place] [id number]. Ride accepted by [employer] - brokered ride through [employer]. Patient aware.    Per email: [employer] will be arranging transport for this ride and has assigned [employer] for 3:36pm EST. [employer] can be reached at [phone]. For any updates or changes to the ride you can reach out [employer] at [phone].   Bouchra Stout, VIRIDIANA, LSW

## 2024-11-04 NOTE — DISCHARGE SUMMARY
Discharge Diagnosis  Sickle cell anemia with pain (Multi)    Issues Requiring Follow-Up  Sickle cell disease on routine exchanges     Test Results Pending At Discharge  Pending Labs       Order Current Status    HEMOGLOBIN IDENTIFICATION WITH PATH REVIEW Preliminary result    Hemoglobin Identification with Path Review Preliminary result            Hospital Course  54 y.o. 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 Received R groin non-tunneled CVC 11/1 with IR, s/p exchange transfusion 11/1 that went well. Endorsing some sharp chest pain after the transfusion. Patient's sickle cell crisis pain usually manifests in shoulders and chest, perhaps experiencing mild pain crisis with the exchange transfusion as the trigger. This is supported by her slightly elevated Tbili/Dbili thought LDH and retic are normal. Will increase home PO oxy dosing for now. Course also c/b CHARLES, will hold lasix and stop colchicine (was not to be continued on prior admission), believe likely pre-renal iso exchange transfusion. PT/OT recommending home care. 11/4 patient discharged home with home health. No new Rx sent as patient states that she does not need any refilled  Patient to follow up with Dr. Whaley outpatient to discuss scheduling her next exchange transfusion.     Pertinent Physical Exam At Time of Discharge  Physical Exam  HENT:      Head: Normocephalic.      Right Ear: Tympanic membrane normal.      Nose: Nose normal.      Mouth/Throat:      Mouth: Mucous membranes are dry.   Eyes:      Pupils: Pupils are equal, round, and reactive to light.   Cardiovascular:      Rate and Rhythm: Normal rate.   Pulmonary:      Effort: Pulmonary effort is  normal.   Abdominal:      Palpations: Abdomen is soft.   Musculoskeletal:         General: Normal range of motion.      Cervical back: Normal range of motion.   Skin:     General: Skin is warm.      Capillary Refill: Capillary refill takes less than 2 seconds.   Neurological:      General: No focal deficit present.      Mental Status: She is alert.   Psychiatric:         Mood and Affect: Mood normal.         Behavior: Behavior normal.         Home Medications     Medication List      CONTINUE taking these medications     albuterol 90 mcg/actuation inhaler; Inhale 2 puffs every 6 hours if   needed for wheezing.   ascorbic acid 500 mg chewable tablet; Commonly known as: Vitamin C   colchicine 0.6 mg tablet; Take 1 tablet (0.6 mg) by mouth 2 times a day.   Continue to take for acute gout flare. Stop 48 hours after gout flare Do   not fill before October 16, 2024.   cyclobenzaprine 10 mg tablet; Commonly known as: Flexeril; Take 1 tablet   (10 mg) by mouth 3 times a day as needed for muscle spasms.   fluticasone 50 mcg/actuation nasal spray; Commonly known as: Flonase   folic acid 1 mg tablet; Commonly known as: Folvite; Take 1 tablet (1 mg)   by mouth once daily.   furosemide 20 mg tablet; Commonly known as: Lasix; Take 1 tablet (20 mg)   by mouth every other day.   metoprolol tartrate 25 mg tablet; Commonly known as: Lopressor   naloxone 4 mg/0.1 mL nasal spray; Commonly known as: Narcan; Administer   1 spray (4 mg) into affected nostril(s) if needed for opioid reversal. May   repeat every 2-3 minutes if needed, alternating nostrils, until medical   assistance becomes available.   ondansetron 4 mg tablet; Commonly known as: Zofran; Take 1 tablet (4 mg)   by mouth every 8 hours if needed for nausea or vomiting.   oxygen gas therapy; Commonly known as: O2; Inhale 1 each continuously.   oxygen gas therapy; Commonly known as: O2; Inhale 1 each once every 24   hours.   oxygen gas therapy; Commonly known as: O2; Inhale 1  each continuously.   traZODone 50 mg tablet; Commonly known as: Desyrel; Take 1 tablet (50   mg) by mouth as needed at bedtime for sleep.   triamcinolone 0.1 % cream; Commonly known as: Kenalog; Apply topically 2   times a day. Apply triamcinolone 0.1% cream to both the black plaques as   well as the red areas across the trunk and thighs   warfarin 5 mg tablet; Commonly known as: Coumadin; Take as directed. If   you are unsure how to take this medication, talk to your nurse or doctor.;   Original instructions: Take 1 tablet (5 mg) by mouth once daily in the   evening.   white petrolatum 41 % ointment ointment; Commonly known as: Aquaphor;   Apply 2 Applications topically 2 times a day. Apply vaseline to all   eroded/denuded areas. Mepilex transfer sheets may be used for areas of   friction     ASK your doctor about these medications     oxyCODONE 10 mg immediate release tablet; Commonly known as: Roxicodone;   Take 1 tablet (10 mg) by mouth every 4 hours if needed for severe pain (7   - 10) (pain).; Ask about: Which instructions should I use?   Ultra-Light Rollator misc; Generic drug: walker; 1 Device 1 time for 1   dose.; Ask about: Should I take this medication?       Outpatient Follow-Up  Future Appointments   Date Time Provider Department Center   11/7/2024 11:00 AM Xiomara Ferguson MD [id number]   2/13/2025  8:40 AM Ankur White DO [id number] [facility]       GRISEL Gomes-CNP

## 2024-11-04 NOTE — SIGNIFICANT EVENT
Rapid Response Nurse Note: RADAR alert: 6    Pager time:   Arrival time:   Event end time:   Location: S4  [x] Triage by phone or secure messaging    Rapid response initiated by:  [] Rapid response RN [] Family [] Nursing Supervisor [] Physician   [x] RADAR auto page [] Sepsis auto-page [] RN [] RT   [] NP/PA [] Other:         RADAR VS: T 35.8 °C; HR 79; RR 18; BP 99/64; SPO2 95% 3L via NC        Interventions:  [x] None [] ABG/VBG [] Assist w/ICU transfer [] BAT paged    [] Bag mask [] Blood [] Cardioversion [] Code Blue   [] Code blue for intubation [] Code status changed [] Chest x-ray [] EKG   [] IV fluid/bolus [] KUB x-ray [] Labs/cultures [] Medication   [] Nebulizer treatment [] NIPPV (CPAP/BiPAP) [] Oxygen [] Oral airway   [] Peripheral IV [] Palliative care consult [] CT/MRI [] Sepsis protocol    [] Suctioned [] Other:     Outcome:  [] Coded and  [] Code blue for intubation [] Coded and transferred to ICU []  on division   [x] Remained on division (no change) [] Remained on division + additional monitoring [] Remained in ED [] Transferred to ED   [] Transferred to ICU [] Transferred to inpatient status [] Transferred for interventions (procedure) [] Transferred to ICU stepdown    [] Transferred to surgery [] Transferred to telemetry [] Sepsis protocol [] STEMI protocol   [] Stroke protocol [x] Bedside nurse instructed to page rapid response for any concerns or acute change in condition/VS     Additional Comments: Division nurse denies any concerns at this time and declines Rapid RN assistance. Patient on 3L via NC.

## 2024-11-04 NOTE — CARE PLAN
Problem: Safety - Adult  Goal: Free from fall injury  Outcome: Progressing     Problem: Discharge Planning  Goal: Discharge to home or other facility with appropriate resources  Outcome: Progressing     Problem: Chronic Conditions and Co-morbidities  Goal: Patient's chronic conditions and co-morbidity symptoms are monitored and maintained or improved  Outcome: Progressing     Problem: Fall/Injury  Goal: Not fall by end of shift  Outcome: Progressing  Goal: Be free from injury by end of the shift  Outcome: Progressing  Goal: Verbalize understanding of personal risk factors for fall in the hospital  Outcome: Progressing  Goal: Verbalize understanding of risk factor reduction measures to prevent injury from fall in the home  Outcome: Progressing  Goal: Use assistive devices by end of the shift  Outcome: Progressing  Goal: Pace activities to prevent fatigue by end of the shift  Outcome: Progressing       The clinical goals for the shift include pt will rate pain below 8/10 throughout shift    Over the shift, the patient remained safe and free from injury. Had increased chest pain throughout shift, medicated PRN. Remained VSS throughout shift. No acute events overnight

## 2024-11-04 NOTE — CARE PLAN
Pt will remain HDS and VSS by 11/4/24 at 1900        Problem: Safety - Adult  Goal: Free from fall injury  Outcome: Progressing     Problem: Discharge Planning  Goal: Discharge to home or other facility with appropriate resources  Outcome: Progressing     Problem: Chronic Conditions and Co-morbidities  Goal: Patient's chronic conditions and co-morbidity symptoms are monitored and maintained or improved  Outcome: Progressing     Problem: Fall/Injury  Goal: Not fall by end of shift  Outcome: Progressing  Goal: Be free from injury by end of the shift  Outcome: Progressing  Goal: Verbalize understanding of personal risk factors for fall in the hospital  Outcome: Progressing  Goal: Verbalize understanding of risk factor reduction measures to prevent injury from fall in the home  Outcome: Progressing  Goal: Use assistive devices by end of the shift  Outcome: Progressing  Goal: Pace activities to prevent fatigue by end of the shift  Outcome: Progressing

## 2024-11-05 LAB
ATRIAL RATE: 81 BPM
ATRIAL RATE: 90 BPM
ATRIAL RATE: 97 BPM
HEMOGLOBIN A2: 2.9 % (ref 2–3.5)
HEMOGLOBIN A: 66.5 % (ref 95.8–98)
HEMOGLOBIN C: 15.7 %
HEMOGLOBIN F: 0.5 % (ref 0–2)
HEMOGLOBIN IDENTIFICATION INTERPRETATION: ABNORMAL
HEMOGLOBIN S: 14.4 %
P AXIS: 53 DEGREES
P AXIS: 63 DEGREES
P AXIS: 67 DEGREES
P OFFSET: 181 MS
P OFFSET: 198 MS
P OFFSET: 202 MS
P ONSET: 126 MS
P ONSET: 148 MS
P ONSET: 149 MS
PATH REVIEW-HGB IDENTIFICATION: ABNORMAL
PR INTERVAL: 160 MS
PR INTERVAL: 162 MS
PR INTERVAL: 172 MS
Q ONSET: 212 MS
Q ONSET: 229 MS
Q ONSET: 229 MS
QRS COUNT: 13 BEATS
QRS COUNT: 15 BEATS
QRS COUNT: 16 BEATS
QRS DURATION: 78 MS
QRS DURATION: 82 MS
QRS DURATION: 84 MS
QT INTERVAL: 338 MS
QT INTERVAL: 358 MS
QT INTERVAL: 376 MS
QTC CALCULATION(BAZETT): 429 MS
QTC CALCULATION(BAZETT): 435 MS
QTC CALCULATION(BAZETT): 436 MS
QTC FREDERICIA: 396 MS
QTC FREDERICIA: 408 MS
QTC FREDERICIA: 415 MS
R AXIS: 71 DEGREES
R AXIS: 76 DEGREES
R AXIS: 87 DEGREES
T AXIS: 57 DEGREES
T AXIS: 67 DEGREES
T AXIS: 78 DEGREES
T OFFSET: 398 MS
T OFFSET: 400 MS
T OFFSET: 408 MS
VENTRICULAR RATE: 81 BPM
VENTRICULAR RATE: 89 BPM
VENTRICULAR RATE: 97 BPM

## 2024-11-07 ENCOUNTER — APPOINTMENT (OUTPATIENT)
Dept: DERMATOLOGY | Facility: CLINIC | Age: 54
End: 2024-11-07
Payer: COMMERCIAL

## 2024-11-07 ENCOUNTER — APPOINTMENT (OUTPATIENT)
Dept: RADIOLOGY | Facility: HOSPITAL | Age: 54
End: 2024-11-07
Payer: COMMERCIAL

## 2024-11-07 ENCOUNTER — HOME CARE VISIT (OUTPATIENT)
Dept: HOME HEALTH SERVICES | Facility: HOME HEALTH | Age: 54
End: 2024-11-07
Payer: COMMERCIAL

## 2024-11-07 VITALS
HEART RATE: 102 BPM | SYSTOLIC BLOOD PRESSURE: 93 MMHG | WEIGHT: 237 LBS | HEIGHT: 65 IN | BODY MASS INDEX: 39.49 KG/M2 | TEMPERATURE: 97.9 F | DIASTOLIC BLOOD PRESSURE: 70 MMHG

## 2024-11-07 LAB
INR IN PPP BY COAGULATION ASSAY EXTERNAL: 1.9
PROTHROMBIN TIME (PT) IN PPP BY COAGULATION ASSAY EXTERNAL: NORMAL

## 2024-11-07 PROCEDURE — 0023 HH SOC

## 2024-11-07 PROCEDURE — G0151 HHCP-SERV OF PT,EA 15 MIN: HCPCS

## 2024-11-07 ASSESSMENT — ENCOUNTER SYMPTOMS
HIGHEST PAIN SEVERITY IN PAST 24 HOURS: 9/10
LOWEST PAIN SEVERITY IN PAST 24 HOURS: 6/10
PAIN: 1
PERSON REPORTING PAIN: PATIENT

## 2024-11-07 NOTE — PROGRESS NOTES
Patient identification verified with 2 identifiers.    Location: [facility] Patient Self-Testing Program [phone]    Referring Physician: Dr. Erich Whaley   Enrollment/ Re-enrollment date: 2025   INR Goal: 2.0-3.0  INR monitoring is per [facility] protocol.  Anticoagulation Medication: warfarin  Indication: Deep Vein Thrombosis (DVT) and Pulmonary Embolism (PE)    Subjective   Bleeding signs/symptoms: No    Bruising: No   Major bleeding event: No  Thrombosis signs/symptoms: No  Thromboembolic event: No  Missed doses: No  Extra doses: No  Medication changes: No  Dietary changes: No  Change in health: No  Change in activity: No  Alcohol: No  Other concerns: No    Upcoming Procedures:  Does the Patient Have any upcoming procedures that require interruption in anticoagulation therapy? no  Does the patient require bridging? no      Anticoagulation Summary  As of 10/31/2024      INR goal:  2.0-3.0   TTR:  88.4% (8.4 mo)   INR used for dosin.90 (2024)   Weekly warfarin total:  37.5 mg               Assessment/Plan   Subtherapeutic     1. New dose: Patient was discharged yesterday after a lengthy stay in the hospital and then rehab.  Patient's INR's are usually therapeutic, so patient will maintain dose and retest in 4 days.  Patient reports that she was discharged home with 5mg daily of Coumadin.    2. Next INR:  4 days      Education provided to patient during the visit:  Patient instructed to call in interim with questions, concerns and changes.

## 2024-11-08 ENCOUNTER — HOME CARE VISIT (OUTPATIENT)
Dept: HOME HEALTH SERVICES | Facility: HOME HEALTH | Age: 54
End: 2024-11-08
Payer: COMMERCIAL

## 2024-11-08 PROCEDURE — G0152 HHCP-SERV OF OT,EA 15 MIN: HCPCS

## 2024-11-08 ASSESSMENT — ACTIVITIES OF DAILY LIVING (ADL)
BATHING_CURRENT_FUNCTION: SUPERVISION
TOILETING: 1
BATHING ASSESSED: 1
DRESSING_LB_CURRENT_FUNCTION: SUPERVISION
DRESSING_UB_CURRENT_FUNCTION: SUPERVISION
BATHING EQUIPMENT USED: SPONGE BATHING
PREPARING MEALS: INDEPENDENT
TOILETING: INDEPENDENT

## 2024-11-08 ASSESSMENT — ENCOUNTER SYMPTOMS
PERSON REPORTING PAIN: PATIENT
HIGHEST PAIN SEVERITY IN PAST 24 HOURS: 10/10
PAIN LOCATION: LEFT LEG
PAIN: 1

## 2024-11-10 ASSESSMENT — ENCOUNTER SYMPTOMS
LIMITED RANGE OF MOTION: 1
OCCASIONAL FEELINGS OF UNSTEADINESS: 1
MUSCLE WEAKNESS: 1

## 2024-11-10 ASSESSMENT — ACTIVITIES OF DAILY LIVING (ADL)
ENTERING_EXITING_HOME: MODERATE ASSIST
OASIS_M1830: 03

## 2024-11-11 ENCOUNTER — APPOINTMENT (OUTPATIENT)
Dept: HEMATOLOGY/ONCOLOGY | Facility: HOSPITAL | Age: 54
End: 2024-11-11
Payer: COMMERCIAL

## 2024-11-11 ENCOUNTER — ANTICOAGULATION - WARFARIN VISIT (OUTPATIENT)
Dept: CARDIOLOGY | Facility: CLINIC | Age: 54
End: 2024-11-11
Payer: COMMERCIAL

## 2024-11-11 DIAGNOSIS — I26.99 RECURRENT PULMONARY EMBOLISM (MULTI): ICD-10-CM

## 2024-11-11 DIAGNOSIS — I82.4Z9 DEEP VEIN THROMBOSIS (DVT) OF DISTAL VEIN OF LOWER EXTREMITY, UNSPECIFIED CHRONICITY, UNSPECIFIED LATERALITY (MULTI): ICD-10-CM

## 2024-11-11 DIAGNOSIS — I82.210 THROMBOSIS OF SUPERIOR VENA CAVA (MULTI): ICD-10-CM

## 2024-11-11 DIAGNOSIS — I82.4Y9 DEEP VEIN THROMBOSIS (DVT) OF PROXIMAL LOWER EXTREMITY, UNSPECIFIED CHRONICITY, UNSPECIFIED LATERALITY (MULTI): Primary | ICD-10-CM

## 2024-11-11 NOTE — PROGRESS NOTES
Patient identification verified with 2 identifiers.    Location: [facility] Patient Self-Testing Program [phone]    Referring Physician: MARIYA TRAN  Enrollment/ Re-enrollment date: 2025   INR Goal: 2.0-3.0  INR monitoring is per [facility] protocol.  Anticoagulation Medication: warfarin  Indication: Deep Vein Thrombosis (DVT) and Pulmonary Embolism (PE)    Subjective   Bleeding signs/symptoms: No    Bruising: No   Major bleeding event: No  Thrombosis signs/symptoms: No  Thromboembolic event: No  Missed doses: No  Extra doses: No  Medication changes: No  Dietary changes: No  Change in health: No  Change in activity: No  Alcohol: No  Other concerns: No    Upcoming Procedures:  Does the Patient Have any upcoming procedures that require interruption in anticoagulation therapy? no  Does the patient require bridging? no      Anticoagulation Summary  As of 2024      INR goal:  2.0-3.0   TTR:  88.3% (8.5 mo)   INR used for dosin.60 (2024)   Weekly warfarin total:  37.5 mg               Assessment/Plan   Therapeutic     1. New dose: no change    2. Next INR: 1 week      Education provided to patient during the visit:  Patient instructed to call in interim with questions, concerns and changes.   Patient educated on interactions between medications and warfarin.   Patient educated on dietary consistency in vitamin k consumption.   Patient educated on affects of alcohol consumption while taking warfarin.   Patient educated on signs of bleeding/clotting.   Patient educated on compliance with dosing, follow up appointments, and prescribed plan of care.

## 2024-11-12 ENCOUNTER — HOME CARE VISIT (OUTPATIENT)
Dept: HOME HEALTH SERVICES | Facility: HOME HEALTH | Age: 54
End: 2024-11-12
Payer: COMMERCIAL

## 2024-11-12 VITALS — RESPIRATION RATE: 18 BRPM | HEART RATE: 82 BPM | SYSTOLIC BLOOD PRESSURE: 128 MMHG | DIASTOLIC BLOOD PRESSURE: 66 MMHG

## 2024-11-12 PROCEDURE — G0157 HHC PT ASSISTANT EA 15: HCPCS | Mod: CQ

## 2024-11-12 ASSESSMENT — PAIN DESCRIPTION - PAIN TYPE: TYPE: ACUTE PAIN

## 2024-11-14 ENCOUNTER — TELEPHONE (OUTPATIENT)
Dept: ADMISSION | Facility: HOSPITAL | Age: 54
End: 2024-11-14
Payer: COMMERCIAL

## 2024-11-14 DIAGNOSIS — D57.00 SICKLE CELL CRISIS (MULTI): ICD-10-CM

## 2024-11-14 RX ORDER — OXYCODONE HYDROCHLORIDE 10 MG/1
10 TABLET ORAL EVERY 4 HOURS PRN
Qty: 75 TABLET | Refills: 0 | Status: SHIPPED | OUTPATIENT
Start: 2024-11-14

## 2024-11-14 NOTE — TELEPHONE ENCOUNTER
Pt is requesting a refill of oxycodone 10mg q4 prn, #75.  Last prescription written 11/1/24.  Preferred pharmacy is [facility] at [address]

## 2024-11-15 ENCOUNTER — HOME CARE VISIT (OUTPATIENT)
Dept: HOME HEALTH SERVICES | Facility: HOME HEALTH | Age: 54
End: 2024-11-15
Payer: COMMERCIAL

## 2024-11-17 PROCEDURE — G0180 MD CERTIFICATION HHA PATIENT: HCPCS | Performed by: PEDIATRICS

## 2024-11-18 ENCOUNTER — HOME CARE VISIT (OUTPATIENT)
Dept: HOME HEALTH SERVICES | Facility: HOME HEALTH | Age: 54
End: 2024-11-18
Payer: COMMERCIAL

## 2024-11-18 ENCOUNTER — APPOINTMENT (OUTPATIENT)
Dept: HEMATOLOGY/ONCOLOGY | Facility: HOSPITAL | Age: 54
End: 2024-11-18
Payer: COMMERCIAL

## 2024-11-18 ENCOUNTER — ANTICOAGULATION - WARFARIN VISIT (OUTPATIENT)
Dept: CARDIOLOGY | Facility: CLINIC | Age: 54
End: 2024-11-18
Payer: COMMERCIAL

## 2024-11-18 VITALS — RESPIRATION RATE: 18 BRPM | SYSTOLIC BLOOD PRESSURE: 126 MMHG | HEART RATE: 80 BPM | DIASTOLIC BLOOD PRESSURE: 70 MMHG

## 2024-11-18 DIAGNOSIS — I26.99 RECURRENT PULMONARY EMBOLISM (MULTI): Primary | ICD-10-CM

## 2024-11-18 DIAGNOSIS — I82.210 THROMBOSIS OF SUPERIOR VENA CAVA (MULTI): ICD-10-CM

## 2024-11-18 DIAGNOSIS — D57.219 SICKLE-CELL-HEMOGLOBIN C DISEASE WITH CRISIS (MULTI): Primary | ICD-10-CM

## 2024-11-18 DIAGNOSIS — I82.4Z9 DEEP VEIN THROMBOSIS (DVT) OF DISTAL VEIN OF LOWER EXTREMITY, UNSPECIFIED CHRONICITY, UNSPECIFIED LATERALITY (MULTI): ICD-10-CM

## 2024-11-18 DIAGNOSIS — Z92.89 HISTORY OF EXCHANGE TRANSFUSION: ICD-10-CM

## 2024-11-18 LAB
INR IN PPP BY COAGULATION ASSAY EXTERNAL: 2.7
PROTHROMBIN TIME (PT) IN PPP BY COAGULATION ASSAY EXTERNAL: NORMAL

## 2024-11-18 PROCEDURE — G0157 HHC PT ASSISTANT EA 15: HCPCS | Mod: CQ

## 2024-11-18 ASSESSMENT — PAIN DESCRIPTION - PAIN TYPE: TYPE: ACUTE PAIN

## 2024-11-18 NOTE — PROGRESS NOTES
Patient identification verified with 2 identifiers.    Location: [facility] Patient Self-Testing Program [phone]    Referring Physician: DR. MARIYA TRAN  Enrollment/ Re-enrollment date: 2025   INR Goal: 2.0-3.0  INR monitoring is per [facility] protocol.  Anticoagulation Medication: warfarin  Indication: Pulmonary Embolism (PE)    Subjective   Bleeding signs/symptoms: No    Bruising: No   Major bleeding event: No  Thrombosis signs/symptoms: No  Thromboembolic event: No  Missed doses: No  Extra doses: No  Medication changes: No  Dietary changes: No  Change in health: No  Change in activity: No  Alcohol: No  Other concerns: No    Upcoming Procedures:  Does the Patient Have any upcoming procedures that require interruption in anticoagulation therapy? no  Does the patient require bridging? no      Anticoagulation Summary  As of 2024      INR goal:  2.0-3.0   TTR:  88.6% (8.8 mo)   INR used for dosin.70 (2024)   Weekly warfarin total:  37.5 mg               Assessment/Plan   Therapeutic     1. New dose: no change  SPOKE TO PT, CONFIRMED CURRENT DOSING INSTRUCTIONS  2. Next INR: 2 weeks      Education provided to patient during the visit:  Patient instructed to call in interim with questions, concerns and changes.   Patient educated on compliance with dosing, follow up appointments, and prescribed plan of care.

## 2024-11-20 ENCOUNTER — HOME CARE VISIT (OUTPATIENT)
Dept: HOME HEALTH SERVICES | Facility: HOME HEALTH | Age: 54
End: 2024-11-20
Payer: COMMERCIAL

## 2024-11-20 PROCEDURE — G0157 HHC PT ASSISTANT EA 15: HCPCS | Mod: CQ

## 2024-11-20 ASSESSMENT — ACTIVITIES OF DAILY LIVING (ADL): EFFECT OF PAIN ON DAILY ACTIVITIES: ANTALGIC GAIT

## 2024-11-20 ASSESSMENT — PAIN DESCRIPTION - PAIN TYPE: TYPE: ACUTE PAIN

## 2024-11-26 ENCOUNTER — HOME CARE VISIT (OUTPATIENT)
Dept: HOME HEALTH SERVICES | Facility: HOME HEALTH | Age: 54
End: 2024-11-26
Payer: COMMERCIAL

## 2024-11-26 VITALS — RESPIRATION RATE: 18 BRPM | DIASTOLIC BLOOD PRESSURE: 74 MMHG | HEART RATE: 78 BPM | SYSTOLIC BLOOD PRESSURE: 122 MMHG

## 2024-11-26 PROCEDURE — G0157 HHC PT ASSISTANT EA 15: HCPCS | Mod: CQ

## 2024-11-26 ASSESSMENT — PAIN DESCRIPTION - PAIN TYPE: TYPE: ACUTE PAIN

## 2024-11-26 ASSESSMENT — ACTIVITIES OF DAILY LIVING (ADL): EFFECT OF PAIN ON DAILY ACTIVITIES: MILD ANTALGIC GAIT

## 2024-11-29 ENCOUNTER — HOME CARE VISIT (OUTPATIENT)
Dept: HOME HEALTH SERVICES | Facility: HOME HEALTH | Age: 54
End: 2024-11-29
Payer: COMMERCIAL

## 2024-11-29 VITALS — HEART RATE: 80 BPM | SYSTOLIC BLOOD PRESSURE: 126 MMHG | RESPIRATION RATE: 18 BRPM | DIASTOLIC BLOOD PRESSURE: 78 MMHG

## 2024-11-29 PROCEDURE — G0157 HHC PT ASSISTANT EA 15: HCPCS | Mod: CQ

## 2024-11-29 ASSESSMENT — PAIN DESCRIPTION - PAIN TYPE: TYPE: ACUTE PAIN

## 2024-11-29 ASSESSMENT — ACTIVITIES OF DAILY LIVING (ADL): EFFECT OF PAIN ON DAILY ACTIVITIES: MILD ANTALGIC GAIT

## 2024-12-02 ENCOUNTER — ANTICOAGULATION - WARFARIN VISIT (OUTPATIENT)
Dept: CARDIOLOGY | Facility: CLINIC | Age: 54
End: 2024-12-02
Payer: COMMERCIAL

## 2024-12-02 DIAGNOSIS — I26.99 RECURRENT PULMONARY EMBOLISM (MULTI): Primary | ICD-10-CM

## 2024-12-02 DIAGNOSIS — I82.4Z9 DEEP VEIN THROMBOSIS (DVT) OF DISTAL VEIN OF LOWER EXTREMITY, UNSPECIFIED CHRONICITY, UNSPECIFIED LATERALITY (MULTI): ICD-10-CM

## 2024-12-02 DIAGNOSIS — I82.210 THROMBOSIS OF SUPERIOR VENA CAVA (MULTI): ICD-10-CM

## 2024-12-02 NOTE — PROGRESS NOTES
Patient identification verified with 2 identifiers.    Location: [facility] Patient Self-Testing Program [phone]    Referring Physician: DR. MARIYA TRAN  Enrollment/ Re-enrollment date: 2025   INR Goal: 2.0-3.0  INR monitoring is per [facility] protocol.  Anticoagulation Medication: warfarin  Indication: Pulmonary Embolism (PE)    Subjective   Bleeding signs/symptoms: No    Bruising: No   Major bleeding event: No  Thrombosis signs/symptoms: No  Thromboembolic event: No  Missed doses: No  Extra doses: No  Medication changes: No  Dietary changes: No  Change in health: No  Change in activity: No  Alcohol: No  Other concerns: No    Upcoming Procedures:  Does the Patient Have any upcoming procedures that require interruption in anticoagulation therapy? no  Does the patient require bridging? no      Anticoagulation Summary  As of 2024      INR goal:  2.0-3.0   TTR:  89.2% (9.2 mo)   INR used for dosin.60 (2024)   Weekly warfarin total:  37.5 mg               Assessment/Plan   Therapeutic     1. New dose: no change  SPOKE TO PT. CONFIRMED CURRENT DOSING INSTRUCTIONS  2. Next INR: 2 weeks      Education provided to patient during the visit:  Patient instructed to call in interim with questions, concerns and changes.   Patient educated on compliance with dosing, follow up appointments, and prescribed plan of care.

## 2024-12-03 ENCOUNTER — HOME CARE VISIT (OUTPATIENT)
Dept: HOME HEALTH SERVICES | Facility: HOME HEALTH | Age: 54
End: 2024-12-03
Payer: COMMERCIAL

## 2024-12-03 ENCOUNTER — TELEPHONE (OUTPATIENT)
Dept: ADMISSION | Facility: HOSPITAL | Age: 54
End: 2024-12-03
Payer: COMMERCIAL

## 2024-12-03 DIAGNOSIS — D57.00 SICKLE CELL CRISIS (MULTI): ICD-10-CM

## 2024-12-03 PROCEDURE — G0157 HHC PT ASSISTANT EA 15: HCPCS | Mod: CQ

## 2024-12-03 RX ORDER — OXYCODONE HYDROCHLORIDE 10 MG/1
10 TABLET ORAL EVERY 4 HOURS PRN
Qty: 75 TABLET | Refills: 0 | Status: SHIPPED | OUTPATIENT
Start: 2024-12-03

## 2024-12-03 NOTE — TELEPHONE ENCOUNTER
Pt is requesting a [facility]. Message sent to the team.

## 2024-12-05 ENCOUNTER — HOME CARE VISIT (OUTPATIENT)
Dept: HOME HEALTH SERVICES | Facility: HOME HEALTH | Age: 54
End: 2024-12-05
Payer: COMMERCIAL

## 2024-12-05 VITALS — DIASTOLIC BLOOD PRESSURE: 80 MMHG | HEART RATE: 86 BPM | SYSTOLIC BLOOD PRESSURE: 120 MMHG

## 2024-12-05 PROCEDURE — G0151 HHCP-SERV OF PT,EA 15 MIN: HCPCS

## 2024-12-05 ASSESSMENT — ENCOUNTER SYMPTOMS
PAIN: 1
PERSON REPORTING PAIN: PATIENT
OCCASIONAL FEELINGS OF UNSTEADINESS: 0
HIGHEST PAIN SEVERITY IN PAST 24 HOURS: 5/10

## 2024-12-05 ASSESSMENT — ACTIVITIES OF DAILY LIVING (ADL)
OASIS_M1830: 01
HOME_HEALTH_OASIS: 00
PHYSICAL TRANSFERS ASSESSED: 1
AMBULATION ASSISTANCE: INDEPENDENT
CURRENT_FUNCTION: INDEPENDENT
AMBULATION ASSISTANCE: 1

## 2024-12-11 ENCOUNTER — LAB (OUTPATIENT)
Dept: LAB | Facility: HOSPITAL | Age: 54
DRG: 812 | End: 2024-12-11
Payer: COMMERCIAL

## 2024-12-11 DIAGNOSIS — Z92.89 HISTORY OF EXCHANGE TRANSFUSION: ICD-10-CM

## 2024-12-11 DIAGNOSIS — Z79.01 WARFARIN ANTICOAGULATION: ICD-10-CM

## 2024-12-11 DIAGNOSIS — I82.4Z9 DEEP VEIN THROMBOSIS (DVT) OF DISTAL VEIN OF LOWER EXTREMITY, UNSPECIFIED CHRONICITY, UNSPECIFIED LATERALITY (MULTI): ICD-10-CM

## 2024-12-11 DIAGNOSIS — Z92.89 HISTORY OF EXCHANGE TRANSFUSION: Primary | ICD-10-CM

## 2024-12-11 DIAGNOSIS — Z86.718 PERSONAL HISTORY OF OTHER VENOUS THROMBOSIS AND EMBOLISM: ICD-10-CM

## 2024-12-11 DIAGNOSIS — D57.219 SICKLE-CELL-HEMOGLOBIN C DISEASE WITH CRISIS (MULTI): ICD-10-CM

## 2024-12-11 LAB
ABO GROUP (TYPE) IN BLOOD: NORMAL
ACANTHOCYTES BLD QL SMEAR: NORMAL
ALBUMIN SERPL BCP-MCNC: 3 G/DL (ref 3.4–5)
ALP SERPL-CCNC: 131 U/L (ref 33–110)
ALT SERPL W P-5'-P-CCNC: 7 U/L (ref 7–45)
ANION GAP SERPL CALC-SCNC: 11 MMOL/L (ref 10–20)
ANTIBODY SCREEN: NORMAL
AST SERPL W P-5'-P-CCNC: 19 U/L (ref 9–39)
BASOPHILS # BLD AUTO: 0.03 X10*3/UL (ref 0–0.1)
BASOPHILS NFR BLD AUTO: 0.4 %
BILIRUB SERPL-MCNC: 1.2 MG/DL (ref 0–1.2)
BUN SERPL-MCNC: 5 MG/DL (ref 6–23)
BURR CELLS BLD QL SMEAR: NORMAL
CA-I BLD-SCNC: 1.21 MMOL/L (ref 1.1–1.33)
CALCIUM SERPL-MCNC: 9.1 MG/DL (ref 8.6–10.3)
CHLORIDE SERPL-SCNC: 104 MMOL/L (ref 98–107)
CO2 SERPL-SCNC: 27 MMOL/L (ref 21–32)
CREAT SERPL-MCNC: 0.65 MG/DL (ref 0.5–1.05)
DACRYOCYTES BLD QL SMEAR: NORMAL
EGFRCR SERPLBLD CKD-EPI 2021: >90 ML/MIN/1.73M*2
EOSINOPHIL # BLD AUTO: 0.31 X10*3/UL (ref 0–0.7)
EOSINOPHIL NFR BLD AUTO: 4.4 %
ERYTHROCYTE [DISTWIDTH] IN BLOOD BY AUTOMATED COUNT: 21.2 % (ref 11.5–14.5)
GIANT PLATELETS BLD QL SMEAR: NORMAL
GLUCOSE SERPL-MCNC: 105 MG/DL (ref 74–99)
HCT VFR BLD AUTO: 32.4 % (ref 36–46)
HGB BLD-MCNC: 11.3 G/DL (ref 12–16)
HGB RETIC QN: 25 PG (ref 28–38)
HOWELL-JOLLY BOD BLD QL SMEAR: PRESENT
HYPOCHROMIA BLD QL SMEAR: NORMAL
IMM GRANULOCYTES # BLD AUTO: 0.01 X10*3/UL (ref 0–0.7)
IMM GRANULOCYTES NFR BLD AUTO: 0.1 % (ref 0–0.9)
IMMATURE RETIC FRACTION: 42.2 %
INR PPP: 1.3 (ref 0.9–1.1)
LDH SERPL L TO P-CCNC: 208 U/L (ref 84–246)
LYMPHOCYTES # BLD AUTO: 2.06 X10*3/UL (ref 1.2–4.8)
LYMPHOCYTES NFR BLD AUTO: 29.3 %
MCH RBC QN AUTO: 25.8 PG (ref 26–34)
MCHC RBC AUTO-ENTMCNC: 34.9 G/DL (ref 32–36)
MCV RBC AUTO: 74 FL (ref 80–100)
MONOCYTES # BLD AUTO: 0.57 X10*3/UL (ref 0.1–1)
MONOCYTES NFR BLD AUTO: 8.1 %
NEUTROPHILS # BLD AUTO: 4.05 X10*3/UL (ref 1.2–7.7)
NEUTROPHILS NFR BLD AUTO: 57.7 %
NRBC BLD-RTO: 5 /100 WBCS (ref 0–0)
PAPPENHEIMER BOD BLD QL SMEAR: PRESENT
PLATELET # BLD AUTO: 245 X10*3/UL (ref 150–450)
PLATELET CLUMP BLD QL SMEAR: PRESENT
POLYCHROMASIA BLD QL SMEAR: NORMAL
POTASSIUM SERPL-SCNC: 3.8 MMOL/L (ref 3.5–5.3)
PROT SERPL-MCNC: 7.3 G/DL (ref 6.4–8.2)
PROTHROMBIN TIME: 14.9 SECONDS (ref 9.8–12.8)
RBC # BLD AUTO: 4.38 X10*6/UL (ref 4–5.2)
RBC MORPH BLD: NORMAL
RETICS #: 0.06 X10*6/UL (ref 0.02–0.08)
RETICS/RBC NFR AUTO: 1.3 % (ref 0.5–2)
RH FACTOR (ANTIGEN D): NORMAL
SCHISTOCYTES BLD QL SMEAR: NORMAL
SODIUM SERPL-SCNC: 138 MMOL/L (ref 136–145)
TARGETS BLD QL SMEAR: NORMAL
WBC # BLD AUTO: 7 X10*3/UL (ref 4.4–11.3)

## 2024-12-11 PROCEDURE — 85045 AUTOMATED RETICULOCYTE COUNT: CPT

## 2024-12-11 PROCEDURE — 36415 COLL VENOUS BLD VENIPUNCTURE: CPT

## 2024-12-11 PROCEDURE — 83615 LACTATE (LD) (LDH) ENZYME: CPT

## 2024-12-11 PROCEDURE — 82330 ASSAY OF CALCIUM: CPT

## 2024-12-11 PROCEDURE — 86901 BLOOD TYPING SEROLOGIC RH(D): CPT

## 2024-12-11 PROCEDURE — 80053 COMPREHEN METABOLIC PANEL: CPT

## 2024-12-11 PROCEDURE — 83021 HEMOGLOBIN CHROMOTOGRAPHY: CPT

## 2024-12-11 PROCEDURE — 85660 RBC SICKLE CELL TEST: CPT

## 2024-12-11 PROCEDURE — 85610 PROTHROMBIN TIME: CPT

## 2024-12-11 PROCEDURE — 86902 BLOOD TYPE ANTIGEN DONOR EA: CPT

## 2024-12-11 PROCEDURE — 86922 COMPATIBILITY TEST ANTIGLOB: CPT

## 2024-12-11 PROCEDURE — 85025 COMPLETE CBC W/AUTO DIFF WBC: CPT

## 2024-12-12 ENCOUNTER — OFFICE VISIT (OUTPATIENT)
Dept: HEMATOLOGY/ONCOLOGY | Facility: HOSPITAL | Age: 54
DRG: 812 | End: 2024-12-12
Payer: COMMERCIAL

## 2024-12-12 ENCOUNTER — HOSPITAL ENCOUNTER (INPATIENT)
Facility: HOSPITAL | Age: 54
DRG: 812 | End: 2024-12-12
Attending: INTERNAL MEDICINE
Payer: COMMERCIAL

## 2024-12-12 DIAGNOSIS — N64.89 BREAST EDEMA: ICD-10-CM

## 2024-12-12 DIAGNOSIS — D57.00 SICKLE CELL ANEMIA WITH PAIN (MULTI): Primary | ICD-10-CM

## 2024-12-12 LAB
HEMOGLOBIN A2: 3.6 % (ref 2–3.5)
HEMOGLOBIN A: 25.1 % (ref 95.8–98)
HEMOGLOBIN C: 38 %
HEMOGLOBIN F: 0.5 % (ref 0–2)
HEMOGLOBIN IDENTIFICATION INTERPRETATION: ABNORMAL
HEMOGLOBIN S: 32.8 %
PATH REVIEW-HGB IDENTIFICATION: ABNORMAL

## 2024-12-12 PROCEDURE — 93005 ELECTROCARDIOGRAM TRACING: CPT

## 2024-12-12 PROCEDURE — 2500000004 HC RX 250 GENERAL PHARMACY W/ HCPCS (ALT 636 FOR OP/ED)

## 2024-12-12 PROCEDURE — 99223 1ST HOSP IP/OBS HIGH 75: CPT

## 2024-12-12 PROCEDURE — 1200000003 HC ONCOLOGY  ROOM WITH TELEMETRY DAILY

## 2024-12-12 PROCEDURE — 93010 ELECTROCARDIOGRAM REPORT: CPT | Performed by: INTERNAL MEDICINE

## 2024-12-12 PROCEDURE — 2500000005 HC RX 250 GENERAL PHARMACY W/O HCPCS

## 2024-12-12 PROCEDURE — 2500000001 HC RX 250 WO HCPCS SELF ADMINISTERED DRUGS (ALT 637 FOR MEDICARE OP)

## 2024-12-12 PROCEDURE — 80346 BENZODIAZEPINES1-12: CPT

## 2024-12-12 PROCEDURE — 80307 DRUG TEST PRSMV CHEM ANLYZR: CPT

## 2024-12-12 RX ORDER — TRAZODONE HYDROCHLORIDE 50 MG/1
50 TABLET ORAL NIGHTLY PRN
Status: DISCONTINUED | OUTPATIENT
Start: 2024-12-12 | End: 2024-12-16 | Stop reason: HOSPADM

## 2024-12-12 RX ORDER — CYCLOBENZAPRINE HCL 10 MG
10 TABLET ORAL 3 TIMES DAILY PRN
Status: DISCONTINUED | OUTPATIENT
Start: 2024-12-12 | End: 2024-12-16 | Stop reason: HOSPADM

## 2024-12-12 RX ORDER — COLCHICINE 0.6 MG/1
0.6 TABLET ORAL 2 TIMES DAILY
Status: DISCONTINUED | OUTPATIENT
Start: 2024-12-12 | End: 2024-12-12

## 2024-12-12 RX ORDER — DOCUSATE SODIUM 100 MG/1
100 CAPSULE, LIQUID FILLED ORAL 2 TIMES DAILY PRN
Status: DISCONTINUED | OUTPATIENT
Start: 2024-12-12 | End: 2024-12-16 | Stop reason: HOSPADM

## 2024-12-12 RX ORDER — ONDANSETRON HYDROCHLORIDE 8 MG/1
8 TABLET, FILM COATED ORAL EVERY 8 HOURS PRN
Status: DISCONTINUED | OUTPATIENT
Start: 2024-12-12 | End: 2024-12-16 | Stop reason: HOSPADM

## 2024-12-12 RX ORDER — HYDROMORPHONE HYDROCHLORIDE 1 MG/ML
1 INJECTION, SOLUTION INTRAMUSCULAR; INTRAVENOUS; SUBCUTANEOUS EVERY 2 HOUR PRN
Status: DISCONTINUED | OUTPATIENT
Start: 2024-12-12 | End: 2024-12-16 | Stop reason: HOSPADM

## 2024-12-12 RX ORDER — FLUTICASONE PROPIONATE 50 MCG
2 SPRAY, SUSPENSION (ML) NASAL DAILY PRN
Status: DISCONTINUED | OUTPATIENT
Start: 2024-12-12 | End: 2024-12-16 | Stop reason: HOSPADM

## 2024-12-12 RX ORDER — METOPROLOL TARTRATE 25 MG/1
12.5 TABLET, FILM COATED ORAL 2 TIMES DAILY
Status: DISCONTINUED | OUTPATIENT
Start: 2024-12-12 | End: 2024-12-16 | Stop reason: HOSPADM

## 2024-12-12 RX ORDER — BISMUTH SUBSALICYLATE 262 MG
1 TABLET,CHEWABLE ORAL DAILY
COMMUNITY

## 2024-12-12 RX ORDER — POLYETHYLENE GLYCOL 3350 17 G/17G
17 POWDER, FOR SOLUTION ORAL DAILY
Status: DISCONTINUED | OUTPATIENT
Start: 2024-12-12 | End: 2024-12-16 | Stop reason: HOSPADM

## 2024-12-12 RX ORDER — AMOXICILLIN 250 MG
1 CAPSULE ORAL NIGHTLY
Status: DISCONTINUED | OUTPATIENT
Start: 2024-12-12 | End: 2024-12-16 | Stop reason: HOSPADM

## 2024-12-12 RX ORDER — FOLIC ACID 1 MG/1
1 TABLET ORAL DAILY
COMMUNITY

## 2024-12-12 RX ORDER — POLYETHYLENE GLYCOL 3350 17 G/17G
17 POWDER, FOR SOLUTION ORAL 2 TIMES DAILY PRN
Status: DISCONTINUED | OUTPATIENT
Start: 2024-12-12 | End: 2024-12-16 | Stop reason: HOSPADM

## 2024-12-12 RX ORDER — DIPHENHYDRAMINE HCL 25 MG
25 CAPSULE ORAL EVERY 6 HOURS PRN
Status: DISCONTINUED | OUTPATIENT
Start: 2024-12-12 | End: 2024-12-16 | Stop reason: HOSPADM

## 2024-12-12 RX ORDER — FUROSEMIDE 20 MG/1
20 TABLET ORAL EVERY OTHER DAY
Status: DISCONTINUED | OUTPATIENT
Start: 2024-12-13 | End: 2024-12-16 | Stop reason: HOSPADM

## 2024-12-12 RX ORDER — WARFARIN SODIUM 5 MG/1
5 TABLET ORAL DAILY
Status: DISCONTINUED | OUTPATIENT
Start: 2024-12-12 | End: 2024-12-13

## 2024-12-12 RX ORDER — PETROLATUM 420 MG/G
2 OINTMENT TOPICAL 2 TIMES DAILY
Status: DISCONTINUED | OUTPATIENT
Start: 2024-12-12 | End: 2024-12-16 | Stop reason: HOSPADM

## 2024-12-12 RX ORDER — ALBUTEROL SULFATE 90 UG/1
2 INHALANT RESPIRATORY (INHALATION) EVERY 6 HOURS PRN
Status: DISCONTINUED | OUTPATIENT
Start: 2024-12-12 | End: 2024-12-16 | Stop reason: HOSPADM

## 2024-12-12 SDOH — ECONOMIC STABILITY: INCOME INSECURITY: IN THE PAST 12 MONTHS HAS THE ELECTRIC, GAS, OIL, OR WATER COMPANY THREATENED TO SHUT OFF SERVICES IN YOUR HOME?: NO

## 2024-12-12 SDOH — ECONOMIC STABILITY: FOOD INSECURITY: WITHIN THE PAST 12 MONTHS, YOU WORRIED THAT YOUR FOOD WOULD RUN OUT BEFORE YOU GOT THE MONEY TO BUY MORE.: NEVER TRUE

## 2024-12-12 SDOH — HEALTH STABILITY: MENTAL HEALTH: HOW OFTEN DO YOU HAVE A DRINK CONTAINING ALCOHOL?: NEVER

## 2024-12-12 SDOH — SOCIAL STABILITY: SOCIAL INSECURITY: ABUSE: ADULT

## 2024-12-12 SDOH — SOCIAL STABILITY: SOCIAL INSECURITY: DO YOU FEEL UNSAFE GOING BACK TO THE PLACE WHERE YOU ARE LIVING?: NO

## 2024-12-12 SDOH — ECONOMIC STABILITY: FOOD INSECURITY: WITHIN THE PAST 12 MONTHS, THE FOOD YOU BOUGHT JUST DIDN'T LAST AND YOU DIDN'T HAVE MONEY TO GET MORE.: NEVER TRUE

## 2024-12-12 SDOH — HEALTH STABILITY: MENTAL HEALTH: HOW OFTEN DO YOU HAVE SIX OR MORE DRINKS ON ONE OCCASION?: NEVER

## 2024-12-12 SDOH — SOCIAL STABILITY: SOCIAL INSECURITY: WITHIN THE LAST YEAR, HAVE YOU BEEN AFRAID OF YOUR PARTNER OR EX-PARTNER?: NO

## 2024-12-12 SDOH — SOCIAL STABILITY: SOCIAL INSECURITY: WITHIN THE LAST YEAR, HAVE YOU BEEN HUMILIATED OR EMOTIONALLY ABUSED IN OTHER WAYS BY YOUR PARTNER OR EX-PARTNER?: NO

## 2024-12-12 SDOH — SOCIAL STABILITY: SOCIAL INSECURITY: DOES ANYONE TRY TO KEEP YOU FROM HAVING/CONTACTING OTHER FRIENDS OR DOING THINGS OUTSIDE YOUR HOME?: NO

## 2024-12-12 SDOH — HEALTH STABILITY: MENTAL HEALTH: HOW MANY DRINKS CONTAINING ALCOHOL DO YOU HAVE ON A TYPICAL DAY WHEN YOU ARE DRINKING?: PATIENT DOES NOT DRINK

## 2024-12-12 SDOH — SOCIAL STABILITY: SOCIAL INSECURITY: ARE THERE ANY APPARENT SIGNS OF INJURIES/BEHAVIORS THAT COULD BE RELATED TO ABUSE/NEGLECT?: NO

## 2024-12-12 SDOH — SOCIAL STABILITY: SOCIAL INSECURITY: ARE YOU OR HAVE YOU BEEN THREATENED OR ABUSED PHYSICALLY, EMOTIONALLY, OR SEXUALLY BY ANYONE?: NO

## 2024-12-12 SDOH — SOCIAL STABILITY: SOCIAL INSECURITY: HAVE YOU HAD THOUGHTS OF HARMING ANYONE ELSE?: NO

## 2024-12-12 SDOH — SOCIAL STABILITY: SOCIAL INSECURITY: WERE YOU ABLE TO COMPLETE ALL THE BEHAVIORAL HEALTH SCREENINGS?: YES

## 2024-12-12 SDOH — SOCIAL STABILITY: SOCIAL INSECURITY: DO YOU FEEL ANYONE HAS EXPLOITED OR TAKEN ADVANTAGE OF YOU FINANCIALLY OR OF YOUR PERSONAL PROPERTY?: NO

## 2024-12-12 SDOH — SOCIAL STABILITY: SOCIAL INSECURITY: HAS ANYONE EVER THREATENED TO HURT YOUR FAMILY OR YOUR PETS?: NO

## 2024-12-12 ASSESSMENT — ENCOUNTER SYMPTOMS
HEMATOLOGIC/LYMPHATIC NEGATIVE: 1
SHORTNESS OF BREATH: 1
NEUROLOGICAL NEGATIVE: 1
PSYCHIATRIC NEGATIVE: 1
CARDIOVASCULAR NEGATIVE: 1
MUSCULOSKELETAL NEGATIVE: 1
APPETITE CHANGE: 1
ROS SKIN COMMENTS: BREAST MASS
GASTROINTESTINAL NEGATIVE: 1
ALLERGIC/IMMUNOLOGIC NEGATIVE: 1
EYES NEGATIVE: 1
ENDOCRINE NEGATIVE: 1

## 2024-12-12 ASSESSMENT — COGNITIVE AND FUNCTIONAL STATUS - GENERAL
PATIENT BASELINE BEDBOUND: NO
CLIMB 3 TO 5 STEPS WITH RAILING: A LITTLE
WALKING IN HOSPITAL ROOM: A LITTLE
DAILY ACTIVITIY SCORE: 24
MOBILITY SCORE: 22

## 2024-12-12 ASSESSMENT — LIFESTYLE VARIABLES
AUDIT-C TOTAL SCORE: 0
SKIP TO QUESTIONS 9-10: 1

## 2024-12-12 ASSESSMENT — PAIN SCALES - GENERAL
PAINLEVEL_OUTOF10: 7
PAINLEVEL_OUTOF10: 8
PAINLEVEL_OUTOF10: 7
PAINLEVEL_OUTOF10: 8
PAINLEVEL_OUTOF10: 7

## 2024-12-12 ASSESSMENT — ACTIVITIES OF DAILY LIVING (ADL)
PATIENT'S MEMORY ADEQUATE TO SAFELY COMPLETE DAILY ACTIVITIES?: YES
ASSISTIVE_DEVICE: WALKER
WALKS IN HOME: INDEPENDENT
DRESSING YOURSELF: INDEPENDENT
TOILETING: INDEPENDENT
HEARING - RIGHT EAR: FUNCTIONAL
ADEQUATE_TO_COMPLETE_ADL: YES
FEEDING YOURSELF: INDEPENDENT
HEARING - LEFT EAR: FUNCTIONAL
LACK_OF_TRANSPORTATION: NO
GROOMING: INDEPENDENT
JUDGMENT_ADEQUATE_SAFELY_COMPLETE_DAILY_ACTIVITIES: YES
BATHING: INDEPENDENT

## 2024-12-12 ASSESSMENT — PAIN DESCRIPTION - LOCATION
LOCATION: GENERALIZED
LOCATION: GENERALIZED

## 2024-12-12 ASSESSMENT — PAIN - FUNCTIONAL ASSESSMENT
PAIN_FUNCTIONAL_ASSESSMENT: 0-10

## 2024-12-12 ASSESSMENT — PAIN SCALES - PAIN ASSESSMENT IN ADVANCED DEMENTIA (PAINAD): TOTALSCORE: MEDICATION (SEE MAR)

## 2024-12-12 NOTE — H&P
History Of Present Illness  Senait Narvaez is a 54 y.o. female 54 y.o. 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 She is being admitted due to her history of arrhythmia and need for tele. On arrival patient tachycardic to 178 on EKG. Patient told to bear down and converted to NSR with HR 90s on EKG. Patient rates generalized pain 8/10. States that she has been generally feeling unwell since yesterday including feeling SOB. Denies fever/chills, N/V/D/C, headache dizziness or vision changes      Past Medical History  She has a past medical history of Asthma, CHF (congestive heart failure), Chronic pain disorder, Compression of vein (02/19/2020), and Hypotension, unspecified (12/10/2020).    Surgical History  She has a past surgical history that includes Appendectomy (08/05/2013); Cholecystectomy (08/05/2013); Other surgical history (05/13/2014); Other surgical history (10/09/2014); Other surgical history (06/09/2014); MR angio head wo IV contrast (8/16/2014); MR angio neck wo IV contrast (8/16/2014); IR CVC tunneled (3/13/2015); IR CVC tunneled (1/29/2016); IR CVC tunneled (1/17/2018); IR CVC tunneled (2/22/2018); IR CVC tunneled (3/22/2018); IR CVC tunneled (4/18/2018); IR CVC tunneled (5/16/2018); IR CVC tunneled (6/12/2018); US guided biopsy lymph node superficial (9/17/2019); CT guided percutaneous biopsy LYMPH node superficial (9/19/2019); IR CVC tunneled (1/21/2020); IR CVC tunneled (2/28/2020); IR CVC tunneled (4/10/2020); IR CVC tunneled (5/22/2020); IR CVC tunneled (6/19/2020); IR CVC tunneled (7/23/2020); IR CVC tunneled (9/4/2020); IR CVC tunneled (10/9/2020); IR CVC tunneled  (11/13/2020); IR CVC tunneled (12/18/2020); IR CVC tunneled (1/22/2021); IR CVC tunneled (2/26/2021); IR CVC tunneled (4/2/2021); IR CVC tunneled (5/7/2021); IR CVC tunneled (6/11/2021); IR CVC tunneled (7/16/2021); IR CVC tunneled (8/20/2021); IR CVC tunneled (9/24/2021); IR CVC tunneled (10/29/2021); IR CVC tunneled (12/3/2021); IR CVC tunneled (1/7/2022); IR CVC tunneled (2/23/2022); IR CVC tunneled (3/25/2022); IR CVC tunneled (4/22/2022); IR CVC tunneled (6/3/2022); IR CVC tunneled (9/18/2017); IR CVC tunneled (10/30/2017); IR CVC tunneled (12/19/2017); IR CVC tunneled (1/6/2023); IR CVC tunneled (2/24/2023); IR CVC tunneled (7/8/2022); IR CVC tunneled (8/12/2022); IR CVC tunneled (9/16/2022); CT angio neck (10/19/2022); IR CVC tunneled (10/20/2022); IR CVC tunneled (11/29/2022); IR CVC tunneled (3/31/2023); IR CVC tunneled (6/9/2023); IR CVC tunneled (7/20/2023); IR CVC tunneled (8/16/2023); IR CVC tunneled (9/21/2023); IR CVC nontunneled (10/26/2023); IR CVC nontunneled (12/7/2023); and Biopsy (9/15/2024).    Oncology History    No history exists.        Social History  She reports that she quit smoking about 10 years ago. Her smoking use included cigarettes. She has been exposed to tobacco smoke. She has never used smokeless tobacco. She reports that she does not currently use alcohol. She reports that she does not currently use drugs.     Allergies  Vancomycin and Oxycontin [oxycodone]    Review of Systems   Constitutional:  Positive for appetite change.   HENT: Negative.     Eyes: Negative.    Respiratory:  Positive for shortness of breath.    Cardiovascular: Negative.    Gastrointestinal: Negative.    Endocrine: Negative.    Genitourinary: Negative.    Musculoskeletal: Negative.    Skin:         Breast mass    Allergic/Immunologic: Negative.    Neurological: Negative.    Hematological: Negative.    Psychiatric/Behavioral: Negative.          Physical Exam     Last Recorded Vitals  Blood pressure 96/73,  pulse 91, temperature 36.6 °C (97.9 °F), temperature source Temporal, resp. rate 18, SpO2 93%.    Relevant Results         Assessment/Plan   Assessment & Plan  Cardiomyopathy    Sickle cell anemia with pain (Multi)    Senait Narvaez is a 54 y.o. female 54 y.o. 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 She is being admitted due to her history of arrhythmia and need for tele. On arrival patient tachycardic to 178 on EKG. Patient told to bear down and converted to NSR with HR 90s on EKG. Started on Dilaudid IV 1mg per carepth on admit. Plan for IR for apheresis line and exchange transfusion 12/13     #HbSC SCD  - receives exchange transfusions q6 weeks (last 11/1)  - [facility] last updated 7/25/24  - OARRS reviewed   - Exchange labs completed 12/11  - Plan for IR placement of CVC for exchange transfusion tomorrow 12/13 AM. Admission coags with INR 1.3 (12/11)  - NPO @ [facility]  - Started on Dilaudid 1mg q2 hours for severe pain   - Continue 2L night time oxygen   - Daily folic acid 1 mg   - Doc senna daily and miralax daily   - zofran prn for nausea  - Benadryl PRN for itching   - Flexeril 10mg TID PRN     #Breast Mass  - Initially noted a couple of months ago   - Mammogram 10/16 showing edema likely vaso-occlusive etiology      #DVT, PE  #SVC thrombus  - INR 1.3 on 12/11  - Held Warfarin on 12/12 for apheresis line   - After apheresis line placed continue warfarin 5mg daily   - Daily coags     #SVT  #pHTN  - Tachycardic to 178 on admit-asked to bear down and converted to NSR HR 90s   - Continue metop 12.5 BID  - Lasix 20 every other day      F: prn  E: K>4, Mg>2  N: regular, NPO @ [facility]  DVT PPx:  Warfarin 5mg daily   GI PPx: no indication    Dispo   - Discharge after exchange if pain controlled   - Full code   - SURROGATE DECISION MAKER: Tati (mom) [phone]  - FUV sickle cell 12/30/24, cardiology 3/13     I spent 60 minutes in the professional and overall care of this patient.      Rach Saleh, APRN-CNP

## 2024-12-12 NOTE — PROGRESS NOTES
Pharmacy Medication History Review    Senait Narvaez is a 54 y.o. female admitted for Cardiomyopathy. Pharmacy reviewed the patient's hqxcj-[id number] medications and allergies for accuracy.    The list below reflects the updated PTA list.   Prior to Admission Medications   Prescriptions Last Dose Informant   albuterol 90 mcg/actuation inhaler  Self   Sig: Inhale 2 puffs every 6 hours if needed for wheezing.   ascorbic acid (Vitamin C) 500 mg chewable tablet 12/11/2024 Self   Sig: Chew 1 tablet (500 mg) once daily. As needed   colchicine 0.6 mg tablet  Self   Sig: Take 1 tablet (0.6 mg) by mouth 2 times a day. Continue to take for acute gout flare. Stop 48 hours after gout flare Do not fill before October 16, 2024.   cyclobenzaprine (Flexeril) 10 mg tablet 12/11/2024 Self   Sig: Take 1 tablet (10 mg) by mouth 3 times a day as needed for muscle spasms.   fluticasone (Flonase) 50 mcg/actuation nasal spray  Self   Sig: Administer 2 sprays into each nostril once daily as needed for rhinitis or allergies.   folic acid (Folvite) 1 mg tablet  Self   Sig: Take 1 tablet (1 mg) by mouth once daily.   furosemide (Lasix) 20 mg tablet 12/11/2024 Self   Sig: Take 1 tablet (20 mg) by mouth every other day.   loratadine (Claritin) 10 mg tablet  Self   Sig: Take 1 tablet (10 mg) by mouth once daily. 1 tab as needed   metoprolol tartrate (Lopressor) 25 mg tablet  Self   Sig: Take 0.5 tablets (12.5 mg) by mouth 2 times a day.  Pt takes differently: 1 tab in the morning and 0.5 tab at night.    multivitamin tablet  Self   Sig: Take 1 tablet by mouth once daily.   naloxone (Narcan) 4 mg/0.1 mL nasal spray  Self   Sig: Administer 1 spray (4 mg) into affected nostril(s) if needed for opioid reversal. May repeat every 2-3 minutes if needed, alternating nostrils, until medical assistance becomes available.   oxyCODONE (Roxicodone) 10 mg immediate release tablet  Self   Sig: Take 1 tablet (10 mg) by mouth every 4 hours if needed for severe  pain (7 - 10) (pain).   oxygen (O2) gas therapy  Self   Sig: Inhale 1 each continuously.   Patient taking differently: Inhale 1 L/min continuously.   oxygen (O2) gas therapy  Self   Sig: Inhale 1 each once every 24 hours.   oxygen (O2) gas therapy  Self   Sig: Inhale 1 each continuously.   traZODone (Desyrel) 50 mg tablet Past Week Self   Sig: Take 1 tablet (50 mg) by mouth as needed at bedtime for sleep.   triamcinolone (Kenalog) 0.1 % cream  Self   Sig: Apply topically 2 times a day. Apply triamcinolone 0.1% cream to both the black plaques as well as the red areas across the trunk and thighs   warfarin (Coumadin) 5 mg tablet 12/11/2024 Self   Sig: Take 1 tablet (5 mg) by mouth once daily in the evening.   white petrolatum (Aquaphor) 41 % ointment ointment  Self   Sig: Apply 2 Applications topically 2 times a day. Apply vaseline to all eroded/denuded areas. Mepilex transfer sheets may be used for areas of friction      Facility-Administered Medications: None        The list below reflects the updated allergy list. Please review each documented allergy for additional clarification and justification.  Allergies  Reviewed by Radha Giron RN on 12/12/2024        Severity Reactions Comments    Vancomycin High Rash     Oxycontin [oxycodone] Not Specified Drowsiness             Patient accepts M2B at discharge. Pharmacy has been updated to [facility].    Sources used to complete the med history include:    [facility]  Pharmacy dispense history  Patient interview Good historian  Chart Review  Care Everywhere   Hem-Onc visit on 12/5  Pharmacy note on 11/1    Below are additional concerns with the patient's PTA list.  Patient reports taking Metoprolol 25 mg differently: 1 tab in the am and 0.5 a tab at night.  Patient has not started taking Colchicine yet.    Medications ADDED:  Folic acid  multivitamin  Medications CHANGED:  none  Medications REMOVED:   none    Darius Little PharmD  Transitions of Care Pharmacist    Meds Ambulatory [facility] Retail Services  Please reach out via Secure Chat for questions, or if no response call [employer] or [facility]

## 2024-12-13 ENCOUNTER — APPOINTMENT (OUTPATIENT)
Dept: OTHER | Facility: HOSPITAL | Age: 54
DRG: 812 | End: 2024-12-13
Payer: COMMERCIAL

## 2024-12-13 ENCOUNTER — APPOINTMENT (OUTPATIENT)
Dept: RADIOLOGY | Facility: HOSPITAL | Age: 54
DRG: 812 | End: 2024-12-13
Payer: COMMERCIAL

## 2024-12-13 LAB
ALBUMIN SERPL BCP-MCNC: 3 G/DL (ref 3.4–5)
ALP SERPL-CCNC: 132 U/L (ref 33–110)
ALT SERPL W P-5'-P-CCNC: 8 U/L (ref 7–45)
AMPHETAMINES UR QL SCN: NORMAL
ANION GAP SERPL CALC-SCNC: 10 MMOL/L (ref 10–20)
AST SERPL W P-5'-P-CCNC: 22 U/L (ref 9–39)
BARBITURATES UR QL SCN: NORMAL
BASOPHILS # BLD AUTO: 0.03 X10*3/UL (ref 0–0.1)
BASOPHILS NFR BLD AUTO: 0.4 %
BILIRUB SERPL-MCNC: 1 MG/DL (ref 0–1.2)
BLOOD EXPIRATION DATE: NORMAL
BUN SERPL-MCNC: 9 MG/DL (ref 6–23)
BZE UR QL SCN: NORMAL
CA-I BLD-SCNC: 0.97 MMOL/L (ref 1.1–1.33)
CALCIUM SERPL-MCNC: 8.5 MG/DL (ref 8.6–10.6)
CANNABINOIDS UR QL SCN: NORMAL
CHLORIDE SERPL-SCNC: 103 MMOL/L (ref 98–107)
CO2 SERPL-SCNC: 29 MMOL/L (ref 21–32)
CREAT SERPL-MCNC: 0.98 MG/DL (ref 0.5–1.05)
CREAT UR-MCNC: 130.6 MG/DL (ref 20–320)
DISPENSE STATUS: NORMAL
EGFRCR SERPLBLD CKD-EPI 2021: 68 ML/MIN/1.73M*2
EOSINOPHIL # BLD AUTO: 0.49 X10*3/UL (ref 0–0.7)
EOSINOPHIL NFR BLD AUTO: 6.3 %
ERYTHROCYTE [DISTWIDTH] IN BLOOD BY AUTOMATED COUNT: 19.4 % (ref 11.5–14.5)
ERYTHROCYTE [DISTWIDTH] IN BLOOD BY AUTOMATED COUNT: 21.4 % (ref 11.5–14.5)
GLUCOSE SERPL-MCNC: 96 MG/DL (ref 74–99)
HCT VFR BLD AUTO: 30.8 % (ref 36–46)
HCT VFR BLD AUTO: 34.6 % (ref 36–46)
HGB BLD-MCNC: 10.4 G/DL (ref 12–16)
HGB BLD-MCNC: 11.2 G/DL (ref 12–16)
HGB RETIC QN: 23 PG (ref 28–38)
HYPOCHROMIA BLD QL SMEAR: NORMAL
IMM GRANULOCYTES # BLD AUTO: 0.02 X10*3/UL (ref 0–0.7)
IMM GRANULOCYTES NFR BLD AUTO: 0.3 % (ref 0–0.9)
IMMATURE RETIC FRACTION: 43.5 %
INR PPP: 1.5 (ref 0.9–1.1)
LDH SERPL L TO P-CCNC: 228 U/L (ref 84–246)
LYMPHOCYTES # BLD AUTO: 3.13 X10*3/UL (ref 1.2–4.8)
LYMPHOCYTES NFR BLD AUTO: 40.4 %
MCH RBC QN AUTO: 25.5 PG (ref 26–34)
MCH RBC QN AUTO: 27.6 PG (ref 26–34)
MCHC RBC AUTO-ENTMCNC: 32.4 G/DL (ref 32–36)
MCHC RBC AUTO-ENTMCNC: 33.8 G/DL (ref 32–36)
MCV RBC AUTO: 76 FL (ref 80–100)
MCV RBC AUTO: 85 FL (ref 80–100)
MONOCYTES # BLD AUTO: 0.74 X10*3/UL (ref 0.1–1)
MONOCYTES NFR BLD AUTO: 9.5 %
NEUTROPHILS # BLD AUTO: 3.34 X10*3/UL (ref 1.2–7.7)
NEUTROPHILS NFR BLD AUTO: 43.1 %
NRBC BLD-RTO: 3.1 /100 WBCS (ref 0–0)
NRBC BLD-RTO: 5.4 /100 WBCS (ref 0–0)
PCP UR QL SCN: NORMAL
PLATELET # BLD AUTO: 141 X10*3/UL (ref 150–450)
PLATELET # BLD AUTO: 362 X10*3/UL (ref 150–450)
POLYCHROMASIA BLD QL SMEAR: NORMAL
POTASSIUM SERPL-SCNC: 4.1 MMOL/L (ref 3.5–5.3)
PRODUCT BLOOD TYPE: 1700
PRODUCT BLOOD TYPE: 1700
PRODUCT BLOOD TYPE: 5100
PRODUCT BLOOD TYPE: 9500
PRODUCT CODE: NORMAL
PROT SERPL-MCNC: 7.1 G/DL (ref 6.4–8.2)
PROTHROMBIN TIME: 17.3 SECONDS (ref 9.8–12.8)
RBC # BLD AUTO: 4.06 X10*6/UL (ref 4–5.2)
RBC # BLD AUTO: 4.08 X10*6/UL (ref 4–5.2)
RBC MORPH BLD: NORMAL
RETICS #: 0.05 X10*6/UL (ref 0.02–0.08)
RETICS/RBC NFR AUTO: 1.2 % (ref 0.5–2)
SODIUM SERPL-SCNC: 138 MMOL/L (ref 136–145)
TARGETS BLD QL SMEAR: NORMAL
UNIT ABO: NORMAL
UNIT NUMBER: NORMAL
UNIT RH: NORMAL
UNIT VOLUME: 282
UNIT VOLUME: 288
UNIT VOLUME: 350
WBC # BLD AUTO: 6.5 X10*3/UL (ref 4.4–11.3)
WBC # BLD AUTO: 7.8 X10*3/UL (ref 4.4–11.3)
XM INTEP: NORMAL

## 2024-12-13 PROCEDURE — 6A550Z0 PHERESIS OF ERYTHROCYTES, SINGLE: ICD-10-PCS | Performed by: HOSPITALIST

## 2024-12-13 PROCEDURE — 2500000004 HC RX 250 GENERAL PHARMACY W/ HCPCS (ALT 636 FOR OP/ED)

## 2024-12-13 PROCEDURE — 85027 COMPLETE CBC AUTOMATED: CPT

## 2024-12-13 PROCEDURE — 1200000003 HC ONCOLOGY  ROOM WITH TELEMETRY DAILY

## 2024-12-13 PROCEDURE — 83615 LACTATE (LD) (LDH) ENZYME: CPT

## 2024-12-13 PROCEDURE — C1769 GUIDE WIRE: HCPCS

## 2024-12-13 PROCEDURE — 2720000007 HC OR 272 NO HCPCS

## 2024-12-13 PROCEDURE — 7100000010 HC PHASE TWO TIME - EACH INCREMENTAL 1 MINUTE

## 2024-12-13 PROCEDURE — 80053 COMPREHEN METABOLIC PANEL: CPT

## 2024-12-13 PROCEDURE — 85025 COMPLETE CBC W/AUTO DIFF WBC: CPT

## 2024-12-13 PROCEDURE — C1894 INTRO/SHEATH, NON-LASER: HCPCS

## 2024-12-13 PROCEDURE — 2500000002 HC RX 250 W HCPCS SELF ADMINISTERED DRUGS (ALT 637 FOR MEDICARE OP, ALT 636 FOR OP/ED)

## 2024-12-13 PROCEDURE — 36010 PLACE CATHETER IN VEIN: CPT | Performed by: RADIOLOGY

## 2024-12-13 PROCEDURE — 83021 HEMOGLOBIN CHROMOTOGRAPHY: CPT

## 2024-12-13 PROCEDURE — 85610 PROTHROMBIN TIME: CPT

## 2024-12-13 PROCEDURE — 77001 FLUOROGUIDE FOR VEIN DEVICE: CPT | Performed by: RADIOLOGY

## 2024-12-13 PROCEDURE — C1752 CATH,HEMODIALYSIS,SHORT-TERM: HCPCS

## 2024-12-13 PROCEDURE — 76937 US GUIDE VASCULAR ACCESS: CPT | Performed by: RADIOLOGY

## 2024-12-13 PROCEDURE — P9016 RBC LEUKOCYTES REDUCED: HCPCS

## 2024-12-13 PROCEDURE — 36512 APHERESIS RBC: CPT | Performed by: PATHOLOGY

## 2024-12-13 PROCEDURE — 2500000004 HC RX 250 GENERAL PHARMACY W/ HCPCS (ALT 636 FOR OP/ED): Performed by: RADIOLOGY

## 2024-12-13 PROCEDURE — 7100000009 HC PHASE TWO TIME - INITIAL BASE CHARGE

## 2024-12-13 PROCEDURE — 2500000001 HC RX 250 WO HCPCS SELF ADMINISTERED DRUGS (ALT 637 FOR MEDICARE OP)

## 2024-12-13 PROCEDURE — 36556 INSERT NON-TUNNEL CV CATH: CPT | Performed by: RADIOLOGY

## 2024-12-13 PROCEDURE — 99233 SBSQ HOSP IP/OBS HIGH 50: CPT | Performed by: HOSPITALIST

## 2024-12-13 PROCEDURE — 82330 ASSAY OF CALCIUM: CPT

## 2024-12-13 PROCEDURE — 36430 TRANSFUSION BLD/BLD COMPNT: CPT

## 2024-12-13 PROCEDURE — 85045 AUTOMATED RETICULOCYTE COUNT: CPT

## 2024-12-13 PROCEDURE — 06H033Z INSERTION OF INFUSION DEVICE INTO INFERIOR VENA CAVA, PERCUTANEOUS APPROACH: ICD-10-PCS | Performed by: RADIOLOGY

## 2024-12-13 PROCEDURE — 2500000005 HC RX 250 GENERAL PHARMACY W/O HCPCS

## 2024-12-13 PROCEDURE — 2780000003 HC OR 278 NO HCPCS

## 2024-12-13 PROCEDURE — 36512 APHERESIS RBC: CPT

## 2024-12-13 RX ORDER — CALCIUM CARBONATE 200(500)MG
1500 TABLET,CHEWABLE ORAL EVERY 5 MIN PRN
Status: DISCONTINUED | OUTPATIENT
Start: 2024-12-13 | End: 2024-12-13 | Stop reason: HOSPADM

## 2024-12-13 RX ORDER — DIPHENHYDRAMINE HCL 25 MG
25 CAPSULE ORAL ONCE
Status: COMPLETED | OUTPATIENT
Start: 2024-12-13 | End: 2024-12-13

## 2024-12-13 RX ORDER — DIPHENHYDRAMINE HYDROCHLORIDE 50 MG/ML
25 INJECTION INTRAMUSCULAR; INTRAVENOUS EVERY 5 MIN PRN
Status: DISCONTINUED | OUTPATIENT
Start: 2024-12-13 | End: 2024-12-13 | Stop reason: HOSPADM

## 2024-12-13 RX ORDER — HEPARIN SODIUM 1000 [USP'U]/ML
1000 INJECTION, SOLUTION INTRAVENOUS; SUBCUTANEOUS ONCE
Status: COMPLETED | OUTPATIENT
Start: 2024-12-13 | End: 2024-12-13

## 2024-12-13 RX ORDER — ENOXAPARIN SODIUM 150 MG/ML
1 INJECTION SUBCUTANEOUS EVERY 12 HOURS SCHEDULED
Status: DISCONTINUED | OUTPATIENT
Start: 2024-12-13 | End: 2024-12-14

## 2024-12-13 RX ORDER — FENTANYL CITRATE 50 UG/ML
INJECTION, SOLUTION INTRAMUSCULAR; INTRAVENOUS
Status: COMPLETED | OUTPATIENT
Start: 2024-12-13 | End: 2024-12-13

## 2024-12-13 RX ORDER — ACETAMINOPHEN 325 MG/1
650 TABLET ORAL ONCE
Status: COMPLETED | OUTPATIENT
Start: 2024-12-13 | End: 2024-12-13

## 2024-12-13 RX ORDER — WARFARIN SODIUM 5 MG/1
5 TABLET ORAL DAILY
Status: DISCONTINUED | OUTPATIENT
Start: 2024-12-13 | End: 2024-12-16 | Stop reason: HOSPADM

## 2024-12-13 ASSESSMENT — PAIN SCALES - GENERAL
PAINLEVEL_OUTOF10: 8
PAINLEVEL_OUTOF10: 5 - MODERATE PAIN
PAINLEVEL_OUTOF10: 6
PAINLEVEL_OUTOF10: 8
PAINLEVEL_OUTOF10: 6
PAINLEVEL_OUTOF10: 0 - NO PAIN
PAINLEVEL_OUTOF10: 7
PAINLEVEL_OUTOF10: 0 - NO PAIN
PAINLEVEL_OUTOF10: 0 - NO PAIN
PAINLEVEL_OUTOF10: 8
PAINLEVEL_OUTOF10: 7
PAINLEVEL_OUTOF10: 7
PAINLEVEL_OUTOF10: 8
PAINLEVEL_OUTOF10: 7
PAINLEVEL_OUTOF10: 7
PAINLEVEL_OUTOF10: 8
PAINLEVEL_OUTOF10: 8
PAINLEVEL_OUTOF10: 7

## 2024-12-13 ASSESSMENT — PAIN - FUNCTIONAL ASSESSMENT
PAIN_FUNCTIONAL_ASSESSMENT: 0-10

## 2024-12-13 ASSESSMENT — COGNITIVE AND FUNCTIONAL STATUS - GENERAL
CLIMB 3 TO 5 STEPS WITH RAILING: A LITTLE
DAILY ACTIVITIY SCORE: 24
MOBILITY SCORE: 23
DAILY ACTIVITIY SCORE: 24
MOBILITY SCORE: 24

## 2024-12-13 NOTE — CARE PLAN
The patient's goals for the shift include      The clinical goals for the shift include Pt will have decreased pain through end of shift 12/13/2024 1900      Problem: Pain  Goal: Takes deep breaths with improved pain control throughout the shift  Outcome: Progressing  Goal: Turns in bed with improved pain control throughout the shift  Outcome: Progressing  Goal: Walks with improved pain control throughout the shift  Outcome: Progressing     Problem: Safety - Adult  Goal: Free from fall injury  Outcome: Progressing     Problem: Discharge Planning  Goal: Discharge to home or other facility with appropriate resources  Outcome: Progressing     Problem: Chronic Conditions and Co-morbidities  Goal: Patient's chronic conditions and co-morbidity symptoms are monitored and maintained or improved  Outcome: Progressing

## 2024-12-13 NOTE — POST-PROCEDURE NOTE
Interventional Radiology Brief Postprocedure Note    Attending: Dr Radames Ibarra    Assistant: Dr Carline Vickers    Diagnosis: Sickle Cell Disease    Description of procedure:  Under ultrasound and fluoroscopic guidance, a  was placed in the right common femoral vein. The patient tolerated the procedure well without complication. Please refer to PACs for detailed report.      Anesthesia:  MAC Moderate    Complications: None    Estimated Blood Loss: minimal    Medications  As of 12/13/24 1042      metoprolol tartrate (Lopressor) tablet 12.5 mg (mg) Total dose:  12.5 mg      Date/Time Rate/Dose/Volume Action       12/12/24  2031 12.5 mg Given               warfarin (Coumadin) tablet 5 mg (mg) Total dose:  Cannot be calculated*   *Administration dose not documented     Date/Time Rate/Dose/Volume Action       12/12/24  1722 *5 mg Missed     12/13/24  0738 *Not included in total Held by provider               white petrolatum (Aquaphor) ointment 2 Application (Application) Total dose:  2 Application      Date/Time Rate/Dose/Volume Action       12/13/24  0049 2 Application Given               oxygen (O2) therapy (L/min) Total volume:  Not documented*   *Total volume has not been documented. View each administration to see the amount administered.     Date/Time Rate/Dose/Volume Action       12/12/24  2030 2 L/min Start               HYDROmorphone (Dilaudid) injection 1 mg (mg) Total dose:  9 mg      Date/Time Rate/Dose/Volume Action       12/12/24  1550 1 mg Given      1806 1 mg Given      2030 1 mg Given      2239 1 mg Given     12/13/24  0043 1 mg Given      0323 1 mg Given      0524 1 mg Given      0812 1 mg Given      1031 1 mg Given               diphenhydrAMINE (BENADryl) capsule 25 mg (mg) Total dose:  25 mg      Date/Time Rate/Dose/Volume Action       12/12/24  2243 25 mg Given               polyethylene glycol (Glycolax, Miralax) packet 17 g (g) Total dose:  0 g*   *Administration not included in total     Date/Time  Rate/Dose/Volume Action       12/12/24  1608 *17 g Missed     12/13/24  1037 *17 g Missed               sennosides-docusate sodium (Nita-Colace) 8.6-50 mg per tablet 1 tablet (tablet) Total dose:  0 tablet*   *Administration not included in total     Date/Time Rate/Dose/Volume Action       12/12/24 2033 *1 tablet Missed               fentaNYL PF (Sublimaze) injection (mcg) Total dose:  100 mcg      Date/Time Rate/Dose/Volume Action       12/13/24  0945 100 mcg Given                   No specimens collected      See detailed result report with images in PACS.    The patient tolerated the procedure well without incident or complication and is in stable condition.

## 2024-12-13 NOTE — POST-PROCEDURE NOTE
This is a 54 y.o. female with sickle cell disease currently on the chronic exchange program who underwent automated red blood cell exchange (RCE) with 9 RBC units with target HgbS/C 29% and target HCT 32%.     I saw and evaluated the patient during the RCE.  The patient was resting in bed.  Vascular access functioned well.    WBC   Date/Time Value Ref Range Status   12/13/2024 10:36 AM 7.8 4.4 - 11.3 x10*3/uL Final     Hemoglobin   Date/Time Value Ref Range Status   12/13/2024 10:36 AM 10.4 (L) 12.0 - 16.0 g/dL Final     Hematocrit   Date/Time Value Ref Range Status   12/13/2024 10:36 AM 30.8 (L) 36.0 - 46.0 % Final     Platelets   Date/Time Value Ref Range Status   12/13/2024 10:36  150 - 450 x10*3/uL Final        POCT Calcium, Ionized   Date/Time Value Ref Range Status   12/11/2024 09:09 AM 1.21 1.1 - 1.33 mmol/L Final     Comment:     The performance characteristics of ionized calcium tested  in heparinized plasma or serum have been validated by the  individual  laboratory site where testing is performed.   Testing on heparinized plasma or serum is not approved by   the FDA; however, such approval is not necessary.        Hemoglobin S   Date/Time Value Ref Range Status   12/11/2024 09:09 AM 32.8 (H) <=0.0 % Final        Ferritin   Date/Time Value Ref Range Status   10/30/2024 01:40 PM 94 8 - 150 ng/mL Final        Pre-procedure vital signs at 1120:  T: 35.9 C, HR: 68, RR: 18, BP: 105/83  Pulse oximetry: 96% on 2 L nasal cannula O2    Post-procedure vital signs at 1327:  T: 36.1 C, HR: 71, RR: 18, BP: 101/76  Pulse oximetry: 97% on 2 L nasal cannula O2    Outcome: RCE completed.  Adverse Reaction: No      Assessment and Plan:  54 y.o. female with sickle cell disease who underwent RCE as part of the chronic exchange program.    Draw post-procedure labs  Vascular access to be managed by the clinical team.  Next procedure to be scheduled by the apheresis center in coordination with primary outpatient  hematology team.  Tentative next RCE in 4-6 weeks.

## 2024-12-13 NOTE — PROGRESS NOTES
Senait Narvaez is a 54 y.o. female on day 1 of admission presenting with Cardiomyopathy.    Subjective   Pt seen at bedside this AM. Reports doing okay, she is concerned over her lack of improvement in L breast. We discussed repeating imaging. Plan for apheresis line and RBC exchange today, she is agreeable.     Denies nausea, vomiting, fever, chills, chest pain, SOB, edema, constipation, diarrhea, or headaches.        Objective     Physical Exam  Constitutional:       Appearance: Normal appearance. She is obese.   HENT:      Head: Normocephalic.      Right Ear: External ear normal.      Left Ear: External ear normal.      Nose: Nose normal.   Eyes:      Extraocular Movements: Extraocular movements intact.      Conjunctiva/sclera: Conjunctivae normal.   Cardiovascular:      Rate and Rhythm: Regular rhythm.   Pulmonary:      Effort: Pulmonary effort is normal.      Breath sounds: Normal breath sounds.   Abdominal:      General: Bowel sounds are normal.   Musculoskeletal:      Cervical back: Normal range of motion and neck supple.   Skin:     General: Skin is warm and dry.   Neurological:      Mental Status: She is alert and oriented to person, place, and time. Mental status is at baseline.   Psychiatric:         Mood and Affect: Mood normal.         Behavior: Behavior normal.         Thought Content: Thought content normal.       Last Recorded Vitals  Blood pressure 109/76, pulse 71, temperature 36 °C (96.8 °F), temperature source Temporal, resp. rate 16, weight 107 kg (236 lb), SpO2 98%.  Intake/Output last 3 Shifts:  I/O last 3 completed shifts:  In: - (0 mL/kg)   Out: 300 (2.8 mL/kg) [Urine:300 (0.1 mL/kg/hr)]  Weight: 107 kg     Relevant Results    .Scheduled medications  furosemide, 20 mg, oral, Every other day  heparin, 1,000 Units, intravenous, Once  heparin, 1,000 Units, intravenous, Once  metoprolol tartrate, 12.5 mg, oral, BID  oxygen, , inhalation, Continuous - Inhalation  polyethylene glycol, 17 g, oral,  Daily  sennosides-docusate sodium, 1 tablet, oral, Nightly  [Held by provider] warfarin, 5 mg, oral, Daily  white petrolatum, 2 Application, Topical, BID      Continuous medications  calcium gluconate 2 g in 100 mL infusion, 25 mL/hr      PRN medications  PRN medications: albuterol, calcium carbonate, cyclobenzaprine, diphenhydrAMINE, diphenhydrAMINE, docusate sodium **OR** polyethylene glycol, fluticasone, HYDROmorphone, ondansetron, sodium chloride, traZODone    .  Results for orders placed or performed during the hospital encounter of 12/12/24 (from the past 24 hours)   Prepare RBC: 9 Units   Result Value Ref Range    PRODUCT CODE A0022M34     Unit Number [id number]     Unit ABO O     Unit RH POS     XM INTEP COMP     Dispense Status IS     Blood Expiration Date 12/27/2024 11:59:00 PM EST     PRODUCT BLOOD TYPE 5100     UNIT VOLUME 350     PRODUCT CODE M3516R22     Unit Number [id number]     Unit ABO O     Unit RH NEG     XM INTEP COMP     Dispense Status IS     Blood Expiration Date 12/26/2024 11:59:00 PM EST     PRODUCT BLOOD TYPE 9500     UNIT VOLUME 282     PRODUCT CODE H6148J43     Unit Number [id number]     Unit ABO O     Unit RH NEG     XM INTEP COMP     Dispense Status IS     Blood Expiration Date 12/27/2024 11:59:00 PM EST     PRODUCT BLOOD TYPE 9500     UNIT VOLUME 288    Screen Opiate/Opioid/Benzo Prescription Compliance   Result Value Ref Range    Creatinine, Urine Random 130.6 20.0 - 320.0 mg/dL    Amphetamine Screen, Urine Presumptive Negative Presumptive Negative    Barbiturate Screen, Urine Presumptive Negative Presumptive Negative    Cannabinoid Screen, Urine Presumptive Negative Presumptive Negative    Cocaine Metabolite Screen, Urine Presumptive Negative Presumptive Negative    PCP Screen, Urine Presumptive Negative Presumptive Negative     *Note: Due to a large number of results and/or encounters for the requested time period, some results have not been displayed. A complete set  of results can be found in Results Review.             Assessment/Plan   Assessment & Plan  Cardiomyopathy    Sickle cell anemia with pain (Multi)    Senait Narvaez is a 55 y.o. female 54 y.o. 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 On arrival patient tachycardic to 178 on EKG (resolved and converted to NS s/p bearing down). 12/13 s/p IR for apheresis line and exchange transfusion. Of note, pt has c/o L breast swelling x3 months (imaging in Oct 2024 unremarkable), repeat L breast US pending in setting of no improvement. DC home resumed O2 pending RBCex completion w/o acute events and pain control.     #HbSC disease   - currently on q6 week exchange transfusions (last 11/1)  - [facility] last updated 7/25/24  - OARRS reviewed, no aberrant behavior noted   - Hemolysis labs are at baseline, 12/13 AM labs pending   - 12/11 Hg S 32%   - 12/13 s/p  IR placement of CVC and RBC exchange   - Started on Dilaudid 1mg q2 hours for severe pain   - Continue 2L night time oxygen   - c/w home folic acid 1 mg daily  - bowel regimen for opioid induced constipation, zofran for opioid induced nausea, and benadrly for opioid induced pruritis      # L Breast Mass  - Initially noted a couple of months ago   - Mammogram 10/16 showing edema likely vaso-occlusive etiology, no evidence of malignancy   - in setting of no improvement since then and continues edema and pain, plan for repeat US per Dr. Whaley  - 12/13 L breast US pending      # DVT, PE  # SVC thrombus  - INR 1.3 2/11  - Held Warfarin on 12/12 for apheresis line   - After apheresis line  placed, will plan for lovenox bridge s/p discussion with pharmacy per attending  - Daily coags     # SVT  # pHTN  - Tachycardic to 178 on admit-asked to bear down and converted to NSR HR 90s   - c/w home metop 12.5 BID and Lasix 20 every other day   - 12/13 started on tele      # dispo   - Full code   - DC home resumed noc O2 post-exchange and pain controlled  - SURROGATE DECISION MAKER: Tati (mom) [phone]  - FUV sickle cell 12/30/24, cardiology 3/13      I spent > 60 minutes in the professional and overall care of this patient.      Marisa Brizuela, APRN-CNP

## 2024-12-13 NOTE — PRE-PROCEDURE NOTE
Interventional Radiology Preprocedure Note    Indication for procedure: The encounter diagnosis was Sickle cell anemia with pain (Multi).    Relevant review of systems: NA    Relevant Labs:   Lab Results   Component Value Date    CREATININE 0.65 12/11/2024    EGFR >90 12/11/2024    INR 1.3 (H) 12/11/2024    PROTIME 14.9 (H) 12/11/2024       Planned Sedation/Anesthesia: Moderate    Airway assessment: normal    Directed physical examination:    Aaox3  Normal Chest Rise    Mallampati: III (soft and hard palate and base of uvula visible)    ASA Score: ASA 3 - Patient with moderate systemic disease with functional limitations    Benefits, risks and alternatives of procedure and planned sedation have been discussed with the patient and/or their representative. All questions answered and they agree to proceed.

## 2024-12-13 NOTE — PROGRESS NOTES
Senait Mitchell [id number]       Procedure type: Red cell Exchange    Replacement Fluids:  9   units of PRBC used for procedure (RBCX)     Procedure Completed Without complications.    Attending notified of completion status. See flow sheet(s) for additional details.  Post-Vitals listed below.    Post-procedure vitals:  1327  BP: (P) 101/76  Temp: (P) 36.1 °C (97 °F)  Heart Rate: (P) 71  Resp: (P) 18  SpO2: (P) 97 % 2L NC       DIAMOND MCALLISTER RN

## 2024-12-14 LAB
ALBUMIN SERPL BCP-MCNC: 2.8 G/DL (ref 3.4–5)
ALP SERPL-CCNC: 113 U/L (ref 33–110)
ALT SERPL W P-5'-P-CCNC: 8 U/L (ref 7–45)
ANION GAP SERPL CALC-SCNC: 15 MMOL/L (ref 10–20)
APTT PPP: 38 SECONDS (ref 27–38)
AST SERPL W P-5'-P-CCNC: 19 U/L (ref 9–39)
BASOPHILS # BLD MANUAL: 0 X10*3/UL (ref 0–0.1)
BASOPHILS NFR BLD MANUAL: 0 %
BILIRUB SERPL-MCNC: 1.6 MG/DL (ref 0–1.2)
BUN SERPL-MCNC: 18 MG/DL (ref 6–23)
CALCIUM SERPL-MCNC: 7.9 MG/DL (ref 8.6–10.6)
CHLORIDE SERPL-SCNC: 102 MMOL/L (ref 98–107)
CO2 SERPL-SCNC: 25 MMOL/L (ref 21–32)
CREAT SERPL-MCNC: 2.46 MG/DL (ref 0.5–1.05)
EGFRCR SERPLBLD CKD-EPI 2021: 23 ML/MIN/1.73M*2
EOSINOPHIL # BLD MANUAL: 0.47 X10*3/UL (ref 0–0.7)
EOSINOPHIL NFR BLD MANUAL: 3.5 %
ERYTHROCYTE [DISTWIDTH] IN BLOOD BY AUTOMATED COUNT: 20.6 % (ref 11.5–14.5)
GLUCOSE SERPL-MCNC: 90 MG/DL (ref 74–99)
HCT VFR BLD AUTO: 31 % (ref 36–46)
HGB BLD-MCNC: 10.2 G/DL (ref 12–16)
HGB RETIC QN: 26 PG (ref 28–38)
HYPOCHROMIA BLD QL SMEAR: ABNORMAL
IMM GRANULOCYTES # BLD AUTO: 0.08 X10*3/UL (ref 0–0.7)
IMM GRANULOCYTES NFR BLD AUTO: 0.6 % (ref 0–0.9)
IMMATURE RETIC FRACTION: 17.9 %
INR PPP: 1.8 (ref 0.9–1.1)
LDH SERPL L TO P-CCNC: 232 U/L (ref 84–246)
LYMPHOCYTES # BLD MANUAL: 0.93 X10*3/UL (ref 1.2–4.8)
LYMPHOCYTES NFR BLD MANUAL: 6.9 %
MCH RBC QN AUTO: 27.7 PG (ref 26–34)
MCHC RBC AUTO-ENTMCNC: 32.9 G/DL (ref 32–36)
MCV RBC AUTO: 84 FL (ref 80–100)
MONOCYTES # BLD MANUAL: 0.81 X10*3/UL (ref 0.1–1)
MONOCYTES NFR BLD MANUAL: 6 %
NEUTS SEG # BLD MANUAL: 11.06 X10*3/UL (ref 1.2–7)
NEUTS SEG NFR BLD MANUAL: 81.9 %
NRBC BLD-RTO: 3.2 /100 WBCS (ref 0–0)
PLATELET # BLD AUTO: 148 X10*3/UL (ref 150–450)
POLYCHROMASIA BLD QL SMEAR: ABNORMAL
POTASSIUM SERPL-SCNC: 5.3 MMOL/L (ref 3.5–5.3)
PROT SERPL-MCNC: 6.2 G/DL (ref 6.4–8.2)
PROTHROMBIN TIME: 20 SECONDS (ref 9.8–12.8)
RBC # BLD AUTO: 3.68 X10*6/UL (ref 4–5.2)
RBC MORPH BLD: ABNORMAL
RETICS #: 0.13 X10*6/UL (ref 0.02–0.08)
RETICS/RBC NFR AUTO: 3.6 % (ref 0.5–2)
SODIUM SERPL-SCNC: 137 MMOL/L (ref 136–145)
TARGETS BLD QL SMEAR: ABNORMAL
TOTAL CELLS COUNTED BLD: 116
VARIANT LYMPHS # BLD MANUAL: 0.23 X10*3/UL (ref 0–0.5)
VARIANT LYMPHS NFR BLD: 1.7 %
WBC # BLD AUTO: 13.5 X10*3/UL (ref 4.4–11.3)

## 2024-12-14 PROCEDURE — 2500000005 HC RX 250 GENERAL PHARMACY W/O HCPCS

## 2024-12-14 PROCEDURE — 2500000001 HC RX 250 WO HCPCS SELF ADMINISTERED DRUGS (ALT 637 FOR MEDICARE OP)

## 2024-12-14 PROCEDURE — 85610 PROTHROMBIN TIME: CPT

## 2024-12-14 PROCEDURE — 2500000004 HC RX 250 GENERAL PHARMACY W/ HCPCS (ALT 636 FOR OP/ED)

## 2024-12-14 PROCEDURE — 85007 BL SMEAR W/DIFF WBC COUNT: CPT

## 2024-12-14 PROCEDURE — 2500000002 HC RX 250 W HCPCS SELF ADMINISTERED DRUGS (ALT 637 FOR MEDICARE OP, ALT 636 FOR OP/ED)

## 2024-12-14 PROCEDURE — 85027 COMPLETE CBC AUTOMATED: CPT

## 2024-12-14 PROCEDURE — 99233 SBSQ HOSP IP/OBS HIGH 50: CPT

## 2024-12-14 PROCEDURE — 1200000003 HC ONCOLOGY  ROOM WITH TELEMETRY DAILY

## 2024-12-14 PROCEDURE — 85045 AUTOMATED RETICULOCYTE COUNT: CPT

## 2024-12-14 PROCEDURE — 80053 COMPREHEN METABOLIC PANEL: CPT

## 2024-12-14 PROCEDURE — 83615 LACTATE (LD) (LDH) ENZYME: CPT

## 2024-12-14 ASSESSMENT — PAIN SCALES - GENERAL
PAINLEVEL_OUTOF10: 8
PAINLEVEL_OUTOF10: 7
PAINLEVEL_OUTOF10: 8
PAINLEVEL_OUTOF10: 7
PAINLEVEL_OUTOF10: 7
PAINLEVEL_OUTOF10: 8
PAINLEVEL_OUTOF10: 7
PAINLEVEL_OUTOF10: 8
PAINLEVEL_OUTOF10: 8
PAINLEVEL_OUTOF10: 7
PAINLEVEL_OUTOF10: 8
PAINLEVEL_OUTOF10: 8
PAINLEVEL_OUTOF10: 7
PAINLEVEL_OUTOF10: 7

## 2024-12-14 ASSESSMENT — PAIN - FUNCTIONAL ASSESSMENT
PAIN_FUNCTIONAL_ASSESSMENT: 0-10

## 2024-12-14 ASSESSMENT — COGNITIVE AND FUNCTIONAL STATUS - GENERAL
MOBILITY SCORE: 24
DAILY ACTIVITIY SCORE: 24
MOBILITY SCORE: 24
MOBILITY SCORE: 24
DAILY ACTIVITIY SCORE: 24
DAILY ACTIVITIY SCORE: 24

## 2024-12-14 ASSESSMENT — PAIN DESCRIPTION - LOCATION: LOCATION: GENERALIZED

## 2024-12-14 NOTE — PROGRESS NOTES
Senait Narvaez is a 54 y.o. female on day 2 of admission presenting with Cardiomyopathy.    Subjective   Pt seen at bedside this AM. Reports tolerated exchange well but feeling more generalized pain all over which is typical for her after an exchange. We discussed plan for discharge likely Monday and pending L breast US prior to homegoing after discussion with her outpatient hematologist, she is agreeable.     Denies nausea, vomiting, fever, chills, chest pain, SOB, diarrhea, constipation, edema, and bleeding.        Objective     Physical Exam  Constitutional:       Appearance: She is obese.   HENT:      Head: Normocephalic.      Right Ear: External ear normal.      Left Ear: External ear normal.      Nose: Nose normal.   Eyes:      Extraocular Movements: Extraocular movements intact.      Conjunctiva/sclera: Conjunctivae normal.   Cardiovascular:      Rate and Rhythm: Normal rate and regular rhythm.   Pulmonary:      Effort: Pulmonary effort is normal.      Comments: Diminished throughout all lung fields  Abdominal:      General: Bowel sounds are normal.   Musculoskeletal:         General: Normal range of motion.      Cervical back: Normal range of motion.   Skin:     Comments: L breast edematous, no drainage or erythema noted   Neurological:      Mental Status: She is alert and oriented to person, place, and time. Mental status is at baseline.   Psychiatric:         Mood and Affect: Mood normal.         Behavior: Behavior normal.         Thought Content: Thought content normal.         Last Recorded Vitals  Blood pressure 107/70, pulse 82, temperature 36 °C (96.8 °F), temperature source Temporal, resp. rate 16, weight 107 kg (236 lb), SpO2 93%.  Intake/Output last 3 Shifts:  I/O last 3 completed shifts:  In: 2745 (25.6 mL/kg) [Other:2745]  Out: 3372 (31.5 mL/kg) [Urine:650 (0.2 mL/kg/hr); Other:2722]  Weight: 107 kg     Relevant Results  This patient has a central line   Reason for the central line remaining today?  Parenteral medication    .Scheduled medications  calcium chloride, 0.5 g, intravenous, Once  enoxaparin, 1 mg/kg, subcutaneous, q12h BEATA  furosemide, 20 mg, oral, Every other day  metoprolol tartrate, 12.5 mg, oral, BID  oxygen, , inhalation, Continuous - Inhalation  polyethylene glycol, 17 g, oral, Daily  sennosides-docusate sodium, 1 tablet, oral, Nightly  warfarin, 5 mg, oral, Daily  white petrolatum, 2 Application, Topical, BID      Continuous medications     PRN medications  PRN medications: albuterol, cyclobenzaprine, diphenhydrAMINE, docusate sodium **OR** polyethylene glycol, fluticasone, HYDROmorphone, ondansetron, traZODone    .  Results for orders placed or performed during the hospital encounter of 12/12/24 (from the past 24 hours)   CBC and Auto Differential   Result Value Ref Range    WBC 7.8 4.4 - 11.3 x10*3/uL    nRBC 5.4 (H) 0.0 - 0.0 /100 WBCs    RBC 4.08 4.00 - 5.20 x10*6/uL    Hemoglobin 10.4 (L) 12.0 - 16.0 g/dL    Hematocrit 30.8 (L) 36.0 - 46.0 %    MCV 76 (L) 80 - 100 fL    MCH 25.5 (L) 26.0 - 34.0 pg    MCHC 33.8 32.0 - 36.0 g/dL    RDW 21.4 (H) 11.5 - 14.5 %    Platelets 362 150 - 450 x10*3/uL    Neutrophils % 43.1 40.0 - 80.0 %    Immature Granulocytes %, Automated 0.3 0.0 - 0.9 %    Lymphocytes % 40.4 13.0 - 44.0 %    Monocytes % 9.5 2.0 - 10.0 %    Eosinophils % 6.3 0.0 - 6.0 %    Basophils % 0.4 0.0 - 2.0 %    Neutrophils Absolute 3.34 1.20 - 7.70 x10*3/uL    Immature Granulocytes Absolute, Automated 0.02 0.00 - 0.70 x10*3/uL    Lymphocytes Absolute 3.13 1.20 - 4.80 x10*3/uL    Monocytes Absolute 0.74 0.10 - 1.00 x10*3/uL    Eosinophils Absolute 0.49 0.00 - 0.70 x10*3/uL    Basophils Absolute 0.03 0.00 - 0.10 x10*3/uL   Comprehensive Metabolic Panel   Result Value Ref Range    Glucose 96 74 - 99 mg/dL    Sodium 138 136 - 145 mmol/L    Potassium 4.1 3.5 - 5.3 mmol/L    Chloride 103 98 - 107 mmol/L    Bicarbonate 29 21 - 32 mmol/L    Anion Gap 10 10 - 20 mmol/L    Urea Nitrogen 9 6 - 23  mg/dL    Creatinine 0.98 0.50 - 1.05 mg/dL    eGFR 68 >60 mL/min/1.73m*2    Calcium 8.5 (L) 8.6 - 10.6 mg/dL    Albumin 3.0 (L) 3.4 - 5.0 g/dL    Alkaline Phosphatase 132 (H) 33 - 110 U/L    Total Protein 7.1 6.4 - 8.2 g/dL    AST 22 9 - 39 U/L    Bilirubin, Total 1.0 0.0 - 1.2 mg/dL    ALT 8 7 - 45 U/L   Lactate Dehydrogenase   Result Value Ref Range     84 - 246 U/L   Reticulocytes   Result Value Ref Range    Retic % 1.2 0.5 - 2.0 %    Retic Absolute 0.049 0.018 - 0.083 x10*6/uL    Reticulocyte Hemoglobin 23 (L) 28 - 38 pg    Immature Retic fraction 43.5 (H) <=16.0 %   Protime-INR   Result Value Ref Range    Protime 17.3 (H) 9.8 - 12.8 seconds    INR 1.5 (H) 0.9 - 1.1   Morphology   Result Value Ref Range    RBC Morphology See Below     Polychromasia Mild     Hypochromia Mild     Target Cells Many    CBC   Result Value Ref Range    WBC 6.5 4.4 - 11.3 x10*3/uL    nRBC 3.1 (H) 0.0 - 0.0 /100 WBCs    RBC 4.06 4.00 - 5.20 x10*6/uL    Hemoglobin 11.2 (L) 12.0 - 16.0 g/dL    Hematocrit 34.6 (L) 36.0 - 46.0 %    MCV 85 80 - 100 fL    MCH 27.6 26.0 - 34.0 pg    MCHC 32.4 32.0 - 36.0 g/dL    RDW 19.4 (H) 11.5 - 14.5 %    Platelets 141 (L) 150 - 450 x10*3/uL   Calcium, Ionized   Result Value Ref Range    POCT Calcium, Ionized 0.97 (L) 1.1 - 1.33 mmol/L   CBC and Auto Differential   Result Value Ref Range    WBC 13.5 (H) 4.4 - 11.3 x10*3/uL    nRBC 3.2 (H) 0.0 - 0.0 /100 WBCs    RBC 3.68 (L) 4.00 - 5.20 x10*6/uL    Hemoglobin 10.2 (L) 12.0 - 16.0 g/dL    Hematocrit 31.0 (L) 36.0 - 46.0 %    MCV 84 80 - 100 fL    MCH 27.7 26.0 - 34.0 pg    MCHC 32.9 32.0 - 36.0 g/dL    RDW 20.6 (H) 11.5 - 14.5 %    Platelets      Neutrophils %      Immature Granulocytes %, Automated      Lymphocytes %      Monocytes %      Eosinophils %      Basophils %      Neutrophils Absolute      Lymphocytes Absolute      Monocytes Absolute      Eosinophils Absolute      Basophils Absolute     Reticulocytes   Result Value Ref Range    Retic % 3.6  (H) 0.5 - 2.0 %    Retic Absolute 0.134 (H) 0.018 - 0.083 x10*6/uL    Reticulocyte Hemoglobin 26 (L) 28 - 38 pg    Immature Retic fraction 17.9 (H) <=16.0 %     *Note: Due to a large number of results and/or encounters for the requested time period, some results have not been displayed. A complete set of results can be found in Results Review.                  Assessment/Plan   Assessment & Plan  Cardiomyopathy    Sickle cell anemia with pain (Multi)    Senait Narvaez is a 54 y.o. 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 On arrival patient tachycardic to 178 on EKG (resolved and converted to NS s/p bearing down). 12/13 s/p IR for apheresis line and RBCex. Of note, pt has c/o L breast swelling x3 months (imaging in Oct 2024 unremarkable), repeat L breast US pending in setting of no improvement and after d/w outpatient hematology. (12/13-current) started bridging with therapeutic lovenox back to coumadin per attending I/s/o subtherapeutic INR. DC home resumed O2 pending RBCex completion w/o acute events and pain control.      #HbSC disease acute on chronic pain   - currently on q6 week exchange transfusions (last 11/1)  - [facility] last updated 7/25/24  - [place]AR[place] reviewed, no aberrant behavior noted   - Hemolysis labs at baseline, Hg (12/14) 10.2   - 12/11 Hg S 32%, post exchange S pending 12/13   - 12/14 s/p Calcium replacement once for ionized Ca 0.97   - Leukocytosis noted12/14 13k, likely 2/2 reactive cont monitor   - 12/13 s/p IR placement of CVC and RBC exchange   - Started on Dilaudid 1mg q2 hours for severe pain on admit  (12/12-current)   - Continue 2L night time oxygen   - c/w home folic acid 1 mg daily  - 12/12 Utox negative   - bowel regimen for opioid induced constipation, zofran for opioid induced nausea, and benadrly for opioid induced pruritis      # L Breast Mass  - Initially noted a couple of months ago   - Mammogram 10/16 showing edema likely vaso-occlusive etiology, no evidence of malignancy   - in setting of no improvement since then and continued edema and pain, plan for repeat US per Dr. Whaley 12/13   - 12/13 L breast US pending      # DVT, PE  # SVC thrombus  - INR 1.5 12/13 --> 1.8 12/14  - Goal INR 2-3   - Held Warfarin on 12/12 for apheresis line   - After apheresis line placed, will plan for lovenox bridge per attending Dr. Hensley   - 12/14 started 1mg/kg Lovenox and resumed home warfarin 5mg daily after discussion with pharmacy (rec ~3 days for bridging)  - Daily coags     # SVT  # pHTN  - Tachycardic to 178 on admit-asked to bear down and converted to NSR HR 90s   - c/w home metop 12.5 BID and Lasix 20 every other day   - (12/13-current) started on tele monitoring      # dispo   - Full code   - DC home resumed noc O2 post-exchange and pain controlled, likely 12/16   - SURROGATE DECISION MAKER: Tati (mom) [phone]  - FUV sickle cell 12/30/24, cardiology 3/13      I spent > 60 minutes in the professional and overall care of this patient.     Assessment and plan as above discussed with attending physician Dr. Shellie Brizuela, APRN-CNP

## 2024-12-14 NOTE — SIGNIFICANT EVENT
Rapid Response Nurse Note: RADAR alert: 6    Pager time:   Arrival time:   Event end time:   Location: [facility]  [x] Triage by phone or secure messaging    Rapid response initiated by:  [] Rapid response RN [] Family [] Nursing Supervisor [] Physician   [x] RADAR auto page [] Sepsis auto-page [] RN [] RT   [] NP/PA [] Other:         Initial VS and/or RADAR VS: T 36 °C; HR 86; RR 16; /75; SPO2 95%.      Interventions:  [x] None [] ABG/VBG [] Assist w/ICU transfer [] BAT paged    [] Bag mask [] Blood [] Cardioversion [] Code Blue   [] Code blue for intubation [] Code status changed [] Chest x-ray [] EKG   [] IV fluid/bolus [] KUB x-ray [] Labs/cultures [] Medication   [] Nebulizer treatment [] NIPPV (CPAP/BiPAP) [] Oxygen [] Oral airway   [] Peripheral IV [] Palliative care consult [] CT/MRI [] Sepsis protocol    [] Suctioned [] Other:     Outcome:  [] Coded and  [] Code blue for intubation [] Coded and transferred to ICU []  on division   [x] Remained on division (no change) [] Remained on division + additional monitoring [] Remained in ED [] Transferred to ED   [] Transferred to ICU [] Transferred to inpatient status [] Transferred for interventions (procedure) [] Transferred to ICU stepdown    [] Transferred to surgery [] Transferred to telemetry [] Sepsis protocol [] STEMI protocol   [] Stroke protocol [x] Bedside nurse instructed to page rapid response for any concerns or acute change in condition/VS

## 2024-12-15 VITALS
DIASTOLIC BLOOD PRESSURE: 77 MMHG | WEIGHT: 236 LBS | TEMPERATURE: 96.8 F | OXYGEN SATURATION: 96 % | HEART RATE: 96 BPM | RESPIRATION RATE: 18 BRPM | BODY MASS INDEX: 39.27 KG/M2 | SYSTOLIC BLOOD PRESSURE: 113 MMHG

## 2024-12-15 LAB
ALBUMIN SERPL BCP-MCNC: 2.9 G/DL (ref 3.4–5)
ALP SERPL-CCNC: 109 U/L (ref 33–110)
ALT SERPL W P-5'-P-CCNC: 6 U/L (ref 7–45)
ANION GAP SERPL CALC-SCNC: 14 MMOL/L (ref 10–20)
APPEARANCE UR: ABNORMAL
APTT PPP: 42 SECONDS (ref 27–38)
AST SERPL W P-5'-P-CCNC: 20 U/L (ref 9–39)
BACTERIA #/AREA URNS AUTO: ABNORMAL /HPF
BASOPHILS # BLD MANUAL: 0 X10*3/UL (ref 0–0.1)
BASOPHILS NFR BLD MANUAL: 0 %
BILIRUB SERPL-MCNC: 1.6 MG/DL (ref 0–1.2)
BILIRUB UR STRIP.AUTO-MCNC: NEGATIVE MG/DL
BUN SERPL-MCNC: 26 MG/DL (ref 6–23)
CALCIUM SERPL-MCNC: 8 MG/DL (ref 8.6–10.6)
CHLORIDE SERPL-SCNC: 99 MMOL/L (ref 98–107)
CO2 SERPL-SCNC: 26 MMOL/L (ref 21–32)
COLOR UR: YELLOW
CREAT SERPL-MCNC: 3.04 MG/DL (ref 0.5–1.05)
EGFRCR SERPLBLD CKD-EPI 2021: 18 ML/MIN/1.73M*2
EOSINOPHIL # BLD MANUAL: 0.63 X10*3/UL (ref 0–0.7)
EOSINOPHIL NFR BLD MANUAL: 4.3 %
ERYTHROCYTE [DISTWIDTH] IN BLOOD BY AUTOMATED COUNT: 22.1 % (ref 11.5–14.5)
GLUCOSE SERPL-MCNC: 85 MG/DL (ref 74–99)
GLUCOSE UR STRIP.AUTO-MCNC: NORMAL MG/DL
HCT VFR BLD AUTO: 28.2 % (ref 36–46)
HGB BLD-MCNC: 9.2 G/DL (ref 12–16)
HGB RETIC QN: 23 PG (ref 28–38)
HYALINE CASTS #/AREA URNS AUTO: ABNORMAL /LPF
HYPOCHROMIA BLD QL SMEAR: ABNORMAL
IMM GRANULOCYTES # BLD AUTO: 0.07 X10*3/UL (ref 0–0.7)
IMM GRANULOCYTES NFR BLD AUTO: 0.5 % (ref 0–0.9)
IMMATURE RETIC FRACTION: 18.9 %
INR PPP: 2.6 (ref 0.9–1.1)
KETONES UR STRIP.AUTO-MCNC: NEGATIVE MG/DL
LDH SERPL L TO P-CCNC: 251 U/L (ref 84–246)
LEUKOCYTE ESTERASE UR QL STRIP.AUTO: NEGATIVE
LYMPHOCYTES # BLD MANUAL: 1.4 X10*3/UL (ref 1.2–4.8)
LYMPHOCYTES NFR BLD MANUAL: 9.6 %
MCH RBC QN AUTO: 27.6 PG (ref 26–34)
MCHC RBC AUTO-ENTMCNC: 32.6 G/DL (ref 32–36)
MCV RBC AUTO: 85 FL (ref 80–100)
MONOCYTES # BLD MANUAL: 0.76 X10*3/UL (ref 0.1–1)
MONOCYTES NFR BLD MANUAL: 5.2 %
MUCOUS THREADS #/AREA URNS AUTO: ABNORMAL /LPF
NEUTS SEG # BLD MANUAL: 11.43 X10*3/UL (ref 1.2–7)
NEUTS SEG NFR BLD MANUAL: 78.3 %
NITRITE UR QL STRIP.AUTO: NEGATIVE
NRBC BLD-RTO: 5.8 /100 WBCS (ref 0–0)
PH UR STRIP.AUTO: 5 [PH]
PLATELET # BLD AUTO: 198 X10*3/UL (ref 150–450)
POTASSIUM SERPL-SCNC: 4.7 MMOL/L (ref 3.5–5.3)
PROT SERPL-MCNC: 6.7 G/DL (ref 6.4–8.2)
PROT UR STRIP.AUTO-MCNC: ABNORMAL MG/DL
PROTHROMBIN TIME: 29.6 SECONDS (ref 9.8–12.8)
RBC # BLD AUTO: 3.33 X10*6/UL (ref 4–5.2)
RBC # UR STRIP.AUTO: NEGATIVE /UL
RBC #/AREA URNS AUTO: ABNORMAL /HPF
RBC MORPH BLD: ABNORMAL
RETICS #: 0.16 X10*6/UL (ref 0.02–0.08)
RETICS/RBC NFR AUTO: 4.8 % (ref 0.5–2)
SODIUM SERPL-SCNC: 134 MMOL/L (ref 136–145)
SP GR UR STRIP.AUTO: 1.02
SQUAMOUS #/AREA URNS AUTO: ABNORMAL /HPF
TARGETS BLD QL SMEAR: ABNORMAL
TOTAL CELLS COUNTED BLD: 115
UROBILINOGEN UR STRIP.AUTO-MCNC: ABNORMAL MG/DL
VARIANT LYMPHS # BLD MANUAL: 0.38 X10*3/UL (ref 0–0.5)
VARIANT LYMPHS NFR BLD: 2.6 %
WBC # BLD AUTO: 14.6 X10*3/UL (ref 4.4–11.3)
WBC #/AREA URNS AUTO: ABNORMAL /HPF

## 2024-12-15 PROCEDURE — 85007 BL SMEAR W/DIFF WBC COUNT: CPT

## 2024-12-15 PROCEDURE — 2500000005 HC RX 250 GENERAL PHARMACY W/O HCPCS

## 2024-12-15 PROCEDURE — 80053 COMPREHEN METABOLIC PANEL: CPT

## 2024-12-15 PROCEDURE — 81001 URINALYSIS AUTO W/SCOPE: CPT

## 2024-12-15 PROCEDURE — 82436 ASSAY OF URINE CHLORIDE: CPT

## 2024-12-15 PROCEDURE — 99233 SBSQ HOSP IP/OBS HIGH 50: CPT

## 2024-12-15 PROCEDURE — 83615 LACTATE (LD) (LDH) ENZYME: CPT

## 2024-12-15 PROCEDURE — 85027 COMPLETE CBC AUTOMATED: CPT

## 2024-12-15 PROCEDURE — 1200000003 HC ONCOLOGY  ROOM WITH TELEMETRY DAILY

## 2024-12-15 PROCEDURE — 2500000004 HC RX 250 GENERAL PHARMACY W/ HCPCS (ALT 636 FOR OP/ED)

## 2024-12-15 PROCEDURE — 2500000001 HC RX 250 WO HCPCS SELF ADMINISTERED DRUGS (ALT 637 FOR MEDICARE OP)

## 2024-12-15 PROCEDURE — 85610 PROTHROMBIN TIME: CPT

## 2024-12-15 PROCEDURE — 85045 AUTOMATED RETICULOCYTE COUNT: CPT

## 2024-12-15 RX ORDER — ENOXAPARIN SODIUM 150 MG/ML
1 INJECTION SUBCUTANEOUS DAILY
Status: DISCONTINUED | OUTPATIENT
Start: 2024-12-15 | End: 2024-12-16 | Stop reason: HOSPADM

## 2024-12-15 ASSESSMENT — COGNITIVE AND FUNCTIONAL STATUS - GENERAL
MOBILITY SCORE: 24
MOBILITY SCORE: 24
DAILY ACTIVITIY SCORE: 24
DAILY ACTIVITIY SCORE: 24

## 2024-12-15 ASSESSMENT — PAIN - FUNCTIONAL ASSESSMENT
PAIN_FUNCTIONAL_ASSESSMENT: 0-10

## 2024-12-15 ASSESSMENT — PAIN SCALES - GENERAL
PAINLEVEL_OUTOF10: 8
PAINLEVEL_OUTOF10: 6
PAINLEVEL_OUTOF10: 8
PAINLEVEL_OUTOF10: 6

## 2024-12-15 ASSESSMENT — PAIN DESCRIPTION - LOCATION
LOCATION: GENERALIZED

## 2024-12-15 NOTE — SIGNIFICANT EVENT
Rapid Response Nurse Note:  [x] RADAR alert/Score 7    Pager time: [phone]  Arrival time: 613  Event end time: 615  Location: [facility] 4010  [x] Phone triage     Rapid response initiated by:  [] Rapid Response RN [] Family [] Nursing Supervisor [] Physician   [x] RADAR auto-page [] Sepsis auto-page [] RN [] RT   [] NP/PA [] Other:     Primary reason for call:   [] BAT [] New CPAP/BiPAP [] Bleeding [] Change in mental status   [] Chest pain [] Code blue [] FiO2 >/= 50% [] HR </= 40 bpm   [] HR >/= 130 bpm [] Hyperglycemia [] Hypoglycemia [x] RADAR    [] RR </= 8 bpm [] RR >/= 30 bpm [] SBP </= 90 mmHg [] SpO2 < 90%   [] Seizure [] Sepsis [] Staff concern:     Initial VS and/or RADAR VS:  radar vitals below in bold    Vitals:    24 1335 24 1713 24 1933 12/15/24 0557   BP: 126/70 95/59 93/56 99/66   BP Location:  Right arm Right arm Left arm   Patient Position:  Lying Lying Lying   Pulse: 97 96 88 102   Resp:  16 16 14   Temp:  36.1 °C (97 °F) 36.2 °C (97.2 °F) 36.2 °C (97.2 °F)   TempSrc:  Temporal Temporal Temporal   SpO2:  96% 93% 92%   Weight:            Interventions:  [x] None [] ABG [] Assist w/ICU transfer [] BAT paged    [] Bag mask [] Blood [] Cardioversion [] Code Blue   [] Code blue for intubation [] Code status changed [] Chest x-ray [] EKG   [] IV fluid/bolus [] KUB x-ray [] Labs/cultures [] Medication   [] Nebulizer treatment [] NIPPV (CPAP/BiPAP) [] Oxygen [] Oral airway   [] Peripheral IV [] Palliative care consult [] CT/MRI [] Sepsis protocol    [] Suctioned [] Other:       Outcome:  [] Coded and  [] Code blue for intubation [] Coded and transferred to ICU []  on division   [x] Remained on division (no change) [] Remained on division + additional monitoring [] Remained in ED [] Transferred to ED   [] Transferred to ICU [] Transferred to inpatient status [] Transferred for interventions (procedure) [] Transferred to ICU stepdown    [] Transferred to surgery [] Transferred to  telemetry [] Sepsis protocol [] STEMI protocol   [] Stroke protocol [x] Bedside nurse instructed to page rapid response for any concerns or acute change in condition/VS     Additional Comments: Spoke to RN regarding vital signs.  No concerns from RN at this time.

## 2024-12-15 NOTE — PROGRESS NOTES
Senait Narvaez is a 54 y.o. female on day 3 of admission presenting with Cardiomyopathy.    Subjective   Patient seen at bedside. States that she has been experiencing severe pain in her right shoulder. Rates pain 7/10. Denies any SOB, CP, heart palpitations, N/V/D/C, headache dizziness or vision changes changes, fever or chills        Objective     Physical Exam  HENT:      Head: Normocephalic.      Nose: Nose normal.      Mouth/Throat:      Mouth: Mucous membranes are moist.   Eyes:      Pupils: Pupils are equal, round, and reactive to light.   Cardiovascular:      Rate and Rhythm: Normal rate.   Pulmonary:      Effort: Pulmonary effort is normal.   Abdominal:      Palpations: Abdomen is soft.   Musculoskeletal:         General: Normal range of motion.      Cervical back: Normal range of motion.   Skin:     General: Skin is warm.      Capillary Refill: Capillary refill takes less than 2 seconds.   Neurological:      General: No focal deficit present.      Mental Status: She is alert.   Psychiatric:         Mood and Affect: Mood normal.         Behavior: Behavior normal.         Last Recorded Vitals  Blood pressure 109/70, pulse 90, temperature 36 °C (96.8 °F), temperature source Temporal, resp. rate 18, weight 107 kg (236 lb), SpO2 94%.  Intake/Output last 3 Shifts:  I/O last 3 completed shifts:  In: 550 (5.1 mL/kg) [IV Piggyback:550]  Out: 170 (1.6 mL/kg) [Urine:170 (0 mL/kg/hr)]  Weight: 107 kg     Relevant Results                              Assessment/Plan   Assessment & Plan  Cardiomyopathy    Sickle cell anemia with pain (Multi)    Senait Narvaez is a 54 y.o. 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 On arrival patient tachycardic to 178 on EKG (resolved and converted to NS s/p bearing down). 12/13 s/p IR for apheresis line and RBCex. Of note, pt has c/o L breast swelling x3 months (imaging in Oct 2024 unremarkable), repeat L breast US pending in setting of no improvement and after d/w outpatient hematology. (12/13-current) started bridging with therapeutic lovenox back to coumadin per attending I/s/o subtherapeutic INR. DC home resumed O2 pending RBCex completion w/o acute events and pain control.      #HbSC disease acute on chronic pain   - currently on q6 week exchange transfusions (last 11/1)  - [facility] last updated 7/25/24  - OARRS reviewed, no aberrant behavior noted   - Hemolysis labs at baseline, Hg (12/14) 10.2   - 12/11 Hg S 32%, post exchange S pending 12/13   - 12/14 s/p Calcium replacement once for ionized Ca 0.97   - Leukocytosis noted12/14 13k, likely 2/2 reactive cont monitor   - 12/13 s/p IR placement of CVC and RBC exchange   - Started on Dilaudid 1mg q2 hours for severe pain on admit (12/12-current)   - Continue 2L night time oxygen   - c/w home folic acid 1 mg daily  - 12/12 Utox negative   - bowel regimen for opioid induced constipation, zofran for opioid induced nausea, and benadrly for opioid induced pruritis     # CHARLES  - Creat increased during admission up to 3.04 on 12/15  - Started gave NS bolus 500mL 12/14 and 12/15  - Sent urine electrolytes=pending  - Sent UA-pending   - Continue to monitor CMP daily   - Avoid nephrotoxic medications      # L Breast Mass  - Initially noted a couple of months ago   - Mammogram 10/16 showing edema likely vaso-occlusive etiology, no evidence of malignancy   - in setting of no improvement since then and continued edema and pain, plan for repeat US per Dr. Whaley 12/13   - 12/13 L breast US pending      # DVT, PE  # SVC thrombus  - INR 1.5 12/13 --> 1.8 12/14  -  Goal INR 2-3   - Held Warfarin on 12/12 for apheresis line   - After apheresis line placed, will plan for lovenox bridge per attending Dr. Hensley   - 12/14 started 1mg/kg Lovenox and resumed home warfarin 5mg daily after discussion with pharmacy (rec ~3 days for bridging)  - Lovenox 12/14, 12/15 and plan for last dose 12/15 and then restart warfarin   - Daily coags     # SVT  # pHTN  - Tachycardic to 178 on admit-asked to bear down and converted to NSR HR 90s   - c/w home metop 12.5 BID and Lasix 20 every other day   - (12/13-current) started on tele monitoring      # dispo   - Full code   - DC home resumed noc O2 post-exchange and pain controlled, likely 12/16   - SURROGATE DECISION MAKER: Tati (mom) [phone]  - FUV sickle cell 12/30/24, cardiology 3/13           I spent 60 minutes in the professional and overall care of this patient.      GRISEL Gomes-CNP  Patient discussed with Dr. Hensley

## 2024-12-15 NOTE — CARE PLAN
Problem: Pain  Goal: Takes deep breaths with improved pain control throughout the shift  Outcome: Progressing  Goal: Turns in bed with improved pain control throughout the shift  Outcome: Progressing  Goal: Walks with improved pain control throughout the shift  Outcome: Progressing     Problem: Safety - Adult  Goal: Free from fall injury  Outcome: Progressing     Problem: Discharge Planning  Goal: Discharge to home or other facility with appropriate resources  Outcome: Progressing     Problem: Chronic Conditions and Co-morbidities  Goal: Patient's chronic conditions and co-morbidity symptoms are monitored and maintained or improved  Outcome: Progressing

## 2024-12-15 NOTE — CARE PLAN
The patient's goals for the shift include      The clinical goals for the shift include pt will remain injury free    Problem: Pain  Goal: Takes deep breaths with improved pain control throughout the shift  Outcome: Progressing  Goal: Turns in bed with improved pain control throughout the shift  Outcome: Progressing  Goal: Walks with improved pain control throughout the shift  Outcome: Progressing     Problem: Safety - Adult  Goal: Free from fall injury  Outcome: Progressing     Problem: Discharge Planning  Goal: Discharge to home or other facility with appropriate resources  Outcome: Progressing     Problem: Chronic Conditions and Co-morbidities  Goal: Patient's chronic conditions and co-morbidity symptoms are monitored and maintained or improved  Outcome: Progressing

## 2024-12-16 ENCOUNTER — ANTICOAGULATION - WARFARIN VISIT (OUTPATIENT)
Dept: CARDIOLOGY | Facility: CLINIC | Age: 54
End: 2024-12-16
Payer: COMMERCIAL

## 2024-12-16 VITALS
SYSTOLIC BLOOD PRESSURE: 112 MMHG | BODY MASS INDEX: 39.27 KG/M2 | RESPIRATION RATE: 18 BRPM | HEART RATE: 80 BPM | OXYGEN SATURATION: 100 % | TEMPERATURE: 96.6 F | DIASTOLIC BLOOD PRESSURE: 76 MMHG | WEIGHT: 236 LBS

## 2024-12-16 DIAGNOSIS — I82.4Z9 DEEP VEIN THROMBOSIS (DVT) OF DISTAL VEIN OF LOWER EXTREMITY, UNSPECIFIED CHRONICITY, UNSPECIFIED LATERALITY (MULTI): ICD-10-CM

## 2024-12-16 DIAGNOSIS — I26.99 RECURRENT PULMONARY EMBOLISM (MULTI): Primary | ICD-10-CM

## 2024-12-16 DIAGNOSIS — I82.210 THROMBOSIS OF SUPERIOR VENA CAVA (MULTI): ICD-10-CM

## 2024-12-16 PROBLEM — D57.00 SICKLE CELL ANEMIA WITH PAIN (MULTI): Status: RESOLVED | Noted: 2024-12-12 | Resolved: 2024-12-16

## 2024-12-16 PROBLEM — I42.9 CARDIOMYOPATHY: Status: RESOLVED | Noted: 2023-09-10 | Resolved: 2024-12-16

## 2024-12-16 LAB
ALBUMIN SERPL BCP-MCNC: 3 G/DL (ref 3.4–5)
ALP SERPL-CCNC: 132 U/L (ref 33–110)
ALT SERPL W P-5'-P-CCNC: 7 U/L (ref 7–45)
ANION GAP SERPL CALC-SCNC: 15 MMOL/L (ref 10–20)
APTT PPP: 50 SECONDS (ref 27–38)
AST SERPL W P-5'-P-CCNC: 20 U/L (ref 9–39)
BASOPHILS # BLD MANUAL: 0 X10*3/UL (ref 0–0.1)
BASOPHILS NFR BLD MANUAL: 0 %
BILIRUB SERPL-MCNC: 1.8 MG/DL (ref 0–1.2)
BUN SERPL-MCNC: 26 MG/DL (ref 6–23)
BURR CELLS BLD QL SMEAR: ABNORMAL
CALCIUM SERPL-MCNC: 8 MG/DL (ref 8.6–10.6)
CHLORIDE SERPL-SCNC: 98 MMOL/L (ref 98–107)
CHLORIDE UR-SCNC: <15 MMOL/L
CHLORIDE/CREATININE (MMOL/G) IN URINE: NORMAL
CO2 SERPL-SCNC: 26 MMOL/L (ref 21–32)
CREAT SERPL-MCNC: 1.96 MG/DL (ref 0.5–1.05)
CREAT UR-MCNC: 269.4 MG/DL (ref 20–320)
EGFRCR SERPLBLD CKD-EPI 2021: 30 ML/MIN/1.73M*2
EOSINOPHIL # BLD MANUAL: 1.06 X10*3/UL (ref 0–0.7)
EOSINOPHIL NFR BLD MANUAL: 9.7 %
ERYTHROCYTE [DISTWIDTH] IN BLOOD BY AUTOMATED COUNT: 22.7 % (ref 11.5–14.5)
GLUCOSE SERPL-MCNC: 80 MG/DL (ref 74–99)
HCT VFR BLD AUTO: 28.6 % (ref 36–46)
HEMOGLOBIN A2: 2.7 % (ref 2–3.5)
HEMOGLOBIN A: 76 % (ref 95.8–98)
HEMOGLOBIN C: 10.3 %
HEMOGLOBIN F: 0.4 % (ref 0–2)
HEMOGLOBIN IDENTIFICATION INTERPRETATION: ABNORMAL
HEMOGLOBIN S: 10.6 %
HGB BLD-MCNC: 9.3 G/DL (ref 12–16)
HGB RETIC QN: 25 PG (ref 28–38)
HYPOCHROMIA BLD QL SMEAR: ABNORMAL
IMM GRANULOCYTES # BLD AUTO: 0.04 X10*3/UL (ref 0–0.7)
IMM GRANULOCYTES NFR BLD AUTO: 0.4 % (ref 0–0.9)
IMMATURE RETIC FRACTION: 19.9 %
INR PPP: 3.2 (ref 0.9–1.1)
LDH SERPL L TO P-CCNC: 261 U/L (ref 84–246)
LYMPHOCYTES # BLD MANUAL: 1.16 X10*3/UL (ref 1.2–4.8)
LYMPHOCYTES NFR BLD MANUAL: 10.6 %
MCH RBC QN AUTO: 27.8 PG (ref 26–34)
MCHC RBC AUTO-ENTMCNC: 32.5 G/DL (ref 32–36)
MCV RBC AUTO: 86 FL (ref 80–100)
MONOCYTES # BLD MANUAL: 1.45 X10*3/UL (ref 0.1–1)
MONOCYTES NFR BLD MANUAL: 13.3 %
NEUTS SEG # BLD MANUAL: 7.24 X10*3/UL (ref 1.2–7)
NEUTS SEG NFR BLD MANUAL: 66.4 %
NRBC BLD-RTO: 21 /100 WBCS (ref 0–0)
PATH REVIEW-HGB IDENTIFICATION: ABNORMAL
PLATELET # BLD AUTO: 283 X10*3/UL (ref 150–450)
POLYCHROMASIA BLD QL SMEAR: ABNORMAL
POTASSIUM SERPL-SCNC: 4.6 MMOL/L (ref 3.5–5.3)
POTASSIUM UR-SCNC: 43 MMOL/L
POTASSIUM/CREAT UR-RTO: 16 MMOL/G CREAT
PROT SERPL-MCNC: 6.9 G/DL (ref 6.4–8.2)
PROTHROMBIN TIME: 36.1 SECONDS (ref 9.8–12.8)
RBC # BLD AUTO: 3.34 X10*6/UL (ref 4–5.2)
RBC MORPH BLD: ABNORMAL
RETICS #: 0.2 X10*6/UL (ref 0.02–0.08)
RETICS/RBC NFR AUTO: 6.1 % (ref 0.5–2)
SODIUM SERPL-SCNC: 134 MMOL/L (ref 136–145)
SODIUM UR-SCNC: 25 MMOL/L
SODIUM/CREAT UR-RTO: 9 MMOL/G CREAT
TARGETS BLD QL SMEAR: ABNORMAL
TOTAL CELLS COUNTED BLD: 113
WBC # BLD AUTO: 10.9 X10*3/UL (ref 4.4–11.3)

## 2024-12-16 PROCEDURE — 2500000004 HC RX 250 GENERAL PHARMACY W/ HCPCS (ALT 636 FOR OP/ED)

## 2024-12-16 PROCEDURE — 83615 LACTATE (LD) (LDH) ENZYME: CPT

## 2024-12-16 PROCEDURE — 85610 PROTHROMBIN TIME: CPT

## 2024-12-16 PROCEDURE — 85007 BL SMEAR W/DIFF WBC COUNT: CPT

## 2024-12-16 PROCEDURE — 99239 HOSP IP/OBS DSCHRG MGMT >30: CPT

## 2024-12-16 PROCEDURE — 85027 COMPLETE CBC AUTOMATED: CPT

## 2024-12-16 PROCEDURE — 80053 COMPREHEN METABOLIC PANEL: CPT

## 2024-12-16 PROCEDURE — 2500000001 HC RX 250 WO HCPCS SELF ADMINISTERED DRUGS (ALT 637 FOR MEDICARE OP)

## 2024-12-16 PROCEDURE — 85045 AUTOMATED RETICULOCYTE COUNT: CPT

## 2024-12-16 ASSESSMENT — PAIN - FUNCTIONAL ASSESSMENT
PAIN_FUNCTIONAL_ASSESSMENT: 0-10

## 2024-12-16 ASSESSMENT — COGNITIVE AND FUNCTIONAL STATUS - GENERAL
DAILY ACTIVITIY SCORE: 24
MOBILITY SCORE: 24

## 2024-12-16 ASSESSMENT — PAIN SCALES - GENERAL
PAINLEVEL_OUTOF10: 8
PAINLEVEL_OUTOF10: 6
PAINLEVEL_OUTOF10: 6
PAINLEVEL_OUTOF10: 7
PAINLEVEL_OUTOF10: 8
PAINLEVEL_OUTOF10: 5 - MODERATE PAIN
PAINLEVEL_OUTOF10: 10 - WORST POSSIBLE PAIN

## 2024-12-16 NOTE — NURSING NOTE
12/16/24 at 0856  Pt discharged home via wheelchair and private vehicle. Pt HDS and VSS. Pts CVC removed by provider prior to discharge and pt tolerated well. Pt provided with discharge paperwork and denies questions. No medications to return to patient, pt belongings sent with pt home.   Sonia Day RN

## 2024-12-16 NOTE — PROGRESS NOTES
Patient identification verified with 2 identifiers.    Location: [facility] Patient Self-Testing Program [phone]    Referring Physician: TOMAS Albert   Enrollment/ Re-enrollment date: 2/15/25   INR Goal: 2.0-3.0  INR monitoring is per [facility] protocol.  Anticoagulation Medication: warfarin  Indication: Deep Vein Thrombosis (DVT) and Pulmonary Embolism (PE)    Subjective   Bleeding signs/symptoms: No    Bruising: No   Major bleeding event: No  Thrombosis signs/symptoms: No  Thromboembolic event: No  Missed doses: No  Extra doses: No  Medication changes: No  Dietary changes: No  Change in health: No  Change in activity: No  Alcohol: No  Other concerns: No    Upcoming Procedures:  Does the Patient Have any upcoming procedures that require interruption in anticoagulation therapy? no  Does the patient require bridging? no      Anticoagulation Summary  As of 12/16/2024      INR goal:  2.0-3.0   TTR:  87.8% (9.6 mo)   INR used for dosing:  3.2 (12/16/2024)   Weekly warfarin total:  37.5 mg               Assessment/Plan   Supratherapeutic     1. New dose:  will take reduced dose tonight      2. Next INR:  retest tomorrow Tuesday 12/17/24      Education provided to patient during the visit:  Patient educated on signs of bleeding/clotting.

## 2024-12-16 NOTE — SIGNIFICANT EVENT
Rapid Response Nurse Note: RADAR alert: 6    Pager time:   Arrival time:   Event end time:   Location: [facility] 4  [] Triage by phone or secure messaging    Rapid response initiated by:  [] Rapid response RN [] Family [] Nursing Supervisor [] Physician   [x] RADAR auto page [] Sepsis auto-page [] RN [] RT   [] NP/PA [] Other:     Primary reason for call:   [] BAT [] New CPAP/BiPAP [] Bleeding [] Change in mental status   [] Chest pain [] Code blue [] FiO2 >/= 50% [] HR </= 40 bpm   [] HR >/= 130 bpm [] Hyperglycemia [] Hypoglycemia [x] RADAR    [] RR </= 8 bpm [] RR >/= 30 bpm [] SBP </= 90 mmHg [] SpO2 < 90%   [] Seizure [] Sepsis [] Shortness of breath  [] Staff concern: see comments     Initial VS and/or RADAR VS: T 35.9 °C; HR 92; RR 16; BP 88/63; SPO2 95%.    Providers present at bedside (if applicable):     Name of ICU Provider contacted (if applicable):     Interventions:  [x] None [] ABG/VBG [] Assist w/ICU transfer [] BAT paged    [] Bag mask [] Blood [] Cardioversion [] Code Blue   [] Code blue for intubation [] Code status changed [] Chest x-ray [] EKG   [] IV fluid/bolus [] KUB x-ray [] Labs/cultures [] Medication   [] Nebulizer treatment [] NIPPV (CPAP/BiPAP) [] Oxygen [] Oral airway   [] Peripheral IV [] Palliative care consult [] CT/MRI [] Sepsis protocol    [] Suctioned [] Other:     Outcome:  [] Coded and  [] Code blue for intubation [] Coded and transferred to ICU []  on division   [x] Remained on division (no change) [] Remained on division + additional monitoring [] Remained in ED [] Transferred to ED   [] Transferred to ICU [] Transferred to inpatient status [] Transferred for interventions (procedure) [] Transferred to ICU stepdown    [] Transferred to surgery [] Transferred to telemetry [] Sepsis protocol [] STEMI protocol   [] Stroke protocol [x] Bedside nurse instructed to page rapid response for any concerns or acute change in condition/VS     Additional Comments: SONIA  "alert- score 6. Upon arrival pt sitting on the side of the bed- feet on the floor watching tv. No complaints and states she \"feels okay\". Recheck of VS and BP 99/69. Spoke with bedside nurse and updated- no immediate concerns.     "

## 2024-12-16 NOTE — CARE PLAN
The patient's goals for the shift include      The clinical goals for the shift include pt will remain safe and free from injury through shift    Problem: Pain  Goal: Takes deep breaths with improved pain control throughout the shift  Outcome: Progressing     Problem: Safety - Adult  Goal: Free from fall injury  Outcome: Progressing     Problem: Chronic Conditions and Co-morbidities  Goal: Patient's chronic conditions and co-morbidity symptoms are monitored and maintained or improved  Outcome: Progressing

## 2024-12-16 NOTE — CARE PLAN
The clinical goals for the shift include Pt will remain HDS and VSS throughout shift 12/16/24 by 1900.      Problem: Pain  Goal: Takes deep breaths with improved pain control throughout the shift  Outcome: Met  Goal: Turns in bed with improved pain control throughout the shift  Outcome: Met  Goal: Walks with improved pain control throughout the shift  Outcome: Met     Problem: Safety - Adult  Goal: Free from fall injury  Outcome: Met     Problem: Discharge Planning  Goal: Discharge to home or other facility with appropriate resources  Outcome: Met     Problem: Chronic Conditions and Co-morbidities  Goal: Patient's chronic conditions and co-morbidity symptoms are monitored and maintained or improved  Outcome: Met     Problem: Fall/Injury  Goal: Not fall by end of shift  Outcome: Met  Goal: Be free from injury by end of the shift  Outcome: Met  Goal: Verbalize understanding of personal risk factors for fall in the hospital  Outcome: Met  Goal: Verbalize understanding of risk factor reduction measures to prevent injury from fall in the home  Outcome: Met  Goal: Use assistive devices by end of the shift  Outcome: Met  Goal: Pace activities to prevent fatigue by end of the shift  Outcome: Met

## 2024-12-17 ENCOUNTER — TELEPHONE (OUTPATIENT)
Dept: ADMISSION | Facility: HOSPITAL | Age: 54
End: 2024-12-17
Payer: COMMERCIAL

## 2024-12-17 ENCOUNTER — ANTICOAGULATION - WARFARIN VISIT (OUTPATIENT)
Dept: CARDIOLOGY | Facility: CLINIC | Age: 54
End: 2024-12-17
Payer: COMMERCIAL

## 2024-12-17 DIAGNOSIS — D57.00 SICKLE CELL CRISIS (MULTI): ICD-10-CM

## 2024-12-17 DIAGNOSIS — I82.4Z9 DEEP VEIN THROMBOSIS (DVT) OF DISTAL VEIN OF LOWER EXTREMITY, UNSPECIFIED CHRONICITY, UNSPECIFIED LATERALITY (MULTI): ICD-10-CM

## 2024-12-17 DIAGNOSIS — I82.210 THROMBOSIS OF SUPERIOR VENA CAVA (MULTI): ICD-10-CM

## 2024-12-17 DIAGNOSIS — D57.1 SICKLE CELL DISEASE WITHOUT CRISIS (MULTI): Primary | ICD-10-CM

## 2024-12-17 DIAGNOSIS — I26.99 RECURRENT PULMONARY EMBOLISM (MULTI): Primary | ICD-10-CM

## 2024-12-17 LAB
1OH-MIDAZOLAM UR CFM-MCNC: <25 NG/ML
6MAM UR CFM-MCNC: <25 NG/ML
7AMINOCLONAZEPAM UR CFM-MCNC: <25 NG/ML
A-OH ALPRAZ UR CFM-MCNC: <25 NG/ML
ALPRAZ UR CFM-MCNC: <25 NG/ML
CHLORDIAZEP UR CFM-MCNC: <25 NG/ML
CLONAZEPAM UR CFM-MCNC: <25 NG/ML
CODEINE UR CFM-MCNC: <50 NG/ML
DIAZEPAM UR CFM-MCNC: <25 NG/ML
EDDP UR CFM-MCNC: <25 NG/ML
FENTANYL UR CFM-MCNC: <2.5 NG/ML
HYDROCODONE CTO UR CFM-MCNC: <25 NG/ML
HYDROMORPHONE UR CFM-MCNC: 2227 NG/ML
INR IN PPP BY COAGULATION ASSAY EXTERNAL: 2.7
LORAZEPAM UR CFM-MCNC: <25 NG/ML
METHADONE UR CFM-MCNC: <25 NG/ML
MIDAZOLAM UR CFM-MCNC: <25 NG/ML
MORPHINE UR CFM-MCNC: <50 NG/ML
NORDIAZEPAM UR CFM-MCNC: <25 NG/ML
NORFENTANYL UR CFM-MCNC: <2.5 NG/ML
NORHYDROCODONE UR CFM-MCNC: <25 NG/ML
NOROXYCODONE UR CFM-MCNC: >1000 NG/ML
NORTRAMADOL UR-MCNC: <50 NG/ML
OXAZEPAM UR CFM-MCNC: <25 NG/ML
OXYCODONE UR CFM-MCNC: 336 NG/ML
OXYMORPHONE UR CFM-MCNC: 1328 NG/ML
PROTHROMBIN TIME (PT) IN PPP BY COAGULATION ASSAY EXTERNAL: NORMAL
TEMAZEPAM UR CFM-MCNC: <25 NG/ML
TRAMADOL UR CFM-MCNC: <50 NG/ML
ZOLPIDEM UR CFM-MCNC: <25 NG/ML
ZOLPIDEM UR-MCNC: <25 NG/ML

## 2024-12-17 RX ORDER — OXYCODONE HYDROCHLORIDE 10 MG/1
10 TABLET ORAL EVERY 4 HOURS PRN
Qty: 75 TABLET | Refills: 0 | Status: SHIPPED | OUTPATIENT
Start: 2024-12-17

## 2024-12-17 NOTE — PROGRESS NOTES
Patient identification verified with 2 identifiers.    Location: [facility] Patient Self-Testing Program [phone]    Referring Physician: TOMAS Albert   Enrollment/ Re-enrollment date: 2/15/25   INR Goal: 2.0-3.0  INR monitoring is per [facility] protocol.  Anticoagulation Medication: warfarin  Indication: Deep Vein Thrombosis (DVT) and Pulmonary Embolism (PE)    Subjective   Bleeding signs/symptoms: No    Bruising: No   Major bleeding event: No  Thrombosis signs/symptoms: No  Thromboembolic event: No  Missed doses: No  Extra doses: No  Medication changes: No  Dietary changes: No  Change in health: No  Change in activity: No  Alcohol: No  Other concerns: No    Upcoming Procedures:  Does the Patient Have any upcoming procedures that require interruption in anticoagulation therapy? no  Does the patient require bridging? no      Anticoagulation Summary  As of 2024      INR goal:  2.0-3.0   TTR:  87.8% (9.6 mo)   INR used for dosin.70 (2024)   Weekly warfarin total:  37.5 mg               Assessment/Plan   Therapeutic  Maintain TWD  Repeat INR in 6 days.      Education provided to patient during the visit:  Patient educated on signs of bleeding/clotting.

## 2024-12-18 LAB
ATRIAL RATE: 99 BPM
P AXIS: 60 DEGREES
P OFFSET: 185 MS
P ONSET: 124 MS
PR INTERVAL: 182 MS
Q ONSET: 215 MS
QRS COUNT: 16 BEATS
QRS DURATION: 80 MS
QT INTERVAL: 330 MS
QTC CALCULATION(BAZETT): 423 MS
QTC FREDERICIA: 389 MS
R AXIS: 153 DEGREES
T AXIS: 73 DEGREES
T OFFSET: 380 MS
VENTRICULAR RATE: 99 BPM

## 2024-12-18 NOTE — CONSULTS
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 She was transferred to [facility] 9/10 with evidence of multi-organ failure likely secondary to vaso-occlusive sickle cell crisis vs sepsis.    She is currently intubated, sedated on Precedex, but responsive, on pressors and Zosyn day 6 of 7 w/negative blood cx thus far. S/p RBC exchange transfusion 9/11.    On admission day 4 (yesterday), pt's mother noticed a new rash across pt's breasts and dermatology was consulted.     Past Medical History  She has a past medical history of Asthma ([facility]), CHF (congestive heart failure) ([facility]), Chronic pain disorder, Compression of vein (02/19/2020), and Hypotension, unspecified (12/10/2020).    Surgical History  She has a past surgical history that includes Appendectomy (08/05/2013); Cholecystectomy (08/05/2013); Other surgical history (05/13/2014); Other surgical history (10/09/2014); Other surgical history (06/09/2014); MR angio head wo IV contrast (8/16/2014); MR angio neck wo IV contrast (8/16/2014); IR CVC tunneled (3/13/2015); IR CVC tunneled (1/29/2016); IR CVC tunneled (1/17/2018); IR CVC tunneled (2/22/2018); IR CVC tunneled (3/22/2018); IR CVC tunneled (4/18/2018); IR CVC tunneled (5/16/2018); IR CVC tunneled (6/12/2018); US guided biopsy lymph node superficial (9/17/2019); CT guided percutaneous biopsy LYMPH node superficial (9/19/2019); IR CVC tunneled (1/21/2020); IR CVC tunneled (2/28/2020); IR CVC tunneled (4/10/2020);  Voicemail full.     My Mihaela msg sent to patient with results.    IR CVC tunneled (5/22/2020); IR CVC tunneled (6/19/2020); IR CVC tunneled (7/23/2020); IR CVC tunneled (9/4/2020); IR CVC tunneled (10/9/2020); IR CVC tunneled (11/13/2020); IR CVC tunneled (12/18/2020); IR CVC tunneled (1/22/2021); IR CVC tunneled (2/26/2021); IR CVC tunneled (4/2/2021); IR CVC tunneled (5/7/2021); IR CVC tunneled (6/11/2021); IR CVC tunneled (7/16/2021); IR CVC tunneled (8/20/2021); IR CVC tunneled (9/24/2021); IR CVC tunneled (10/29/2021); IR CVC tunneled (12/3/2021); IR CVC tunneled (1/7/2022); IR CVC tunneled (2/23/2022); IR CVC tunneled (3/25/2022); IR CVC tunneled (4/22/2022); IR CVC tunneled (6/3/2022); IR CVC tunneled (9/18/2017); IR CVC tunneled (10/30/2017); IR CVC tunneled (12/19/2017); IR CVC tunneled (1/6/2023); IR CVC tunneled (2/24/2023); IR CVC tunneled (7/8/2022); IR CVC tunneled (8/12/2022); IR CVC tunneled (9/16/2022); CT angio neck (10/19/2022); IR CVC tunneled (10/20/2022); IR CVC tunneled (11/29/2022); IR CVC tunneled (3/31/2023); IR CVC tunneled (6/9/2023); IR CVC tunneled (7/20/2023); IR CVC tunneled (8/16/2023); IR CVC tunneled (9/21/2023); IR CVC nontunneled (10/26/2023); and IR CVC nontunneled (12/7/2023).     Social History  Quit smoking 10+yrs ago, no illicit drug use. Does drink alcohol.    Family History  Family History   Problem Relation Name Age of Onset    Diabetes Father      Gout Father      Sickle cell trait Father      Seizures Sister      Diabetes Other      Uterine cancer Other      Other (congenital blindness) Cousin          Allergies  Patient has no known allergies.    Home Medications  Prior to Admission medications    Medication Sig Start Date End Date Taking? Authorizing Provider   albuterol 90 mcg/actuation inhaler Inhale 2 puffs every 6 hours if needed for wheezing.  Patient not taking: Reported on 9/11/2024 1/21/24 1/20/25  Shayla Kaur PA-C   ascorbic acid (Vitamin C) 500 mg chewable tablet Chew 1 tablet (500 mg) once daily. As needed     Historical Provider, MD   benzonatate (Tessalon) 100 mg capsule Take 1 capsule (100 mg) by mouth 2 times a day as needed for cough. Do not crush or chew.    Historical Provider, MD   cyclobenzaprine (Flexeril) 10 mg tablet Take 1 tablet (10 mg) by mouth 3 times a day as needed for muscle spasms. 7/17/24   Erich hWaley MD   fluticasone (Flonase) 50 mcg/actuation nasal spray Administer 2 sprays into each nostril if needed. 10/26/23   Historical Provider, MD   folic acid (Folvite) 1 mg tablet Take 1 tablet (1 mg) by mouth once daily. 8/12/24   Erich Whaley MD   furosemide (Lasix) 20 mg tablet Take 1 tablet (20 mg) by mouth every other day. 8/13/24 8/13/25  Ankur White DO   metoprolol tartrate (Lopressor) 25 mg tablet Take 0.5 tablets (12.5 mg) by mouth 2 times a day. 8/13/24 8/13/25  Ankur White DO   naloxone (Narcan) 4 mg/0.1 mL nasal spray Administer 1 spray (4 mg) into affected nostril(s) if needed for opioid reversal. May repeat every 2-3 minutes if needed, alternating nostrils, until medical assistance becomes available. 1/4/24 1/3/25  GRISEL Mendez-CNP   ondansetron (Zofran) 4 mg tablet Take 1 tablet (4 mg) by mouth every 8 hours if needed for nausea or vomiting. 5/3/24   GRISEL Albert-CNP   oxyCODONE (Roxicodone) 10 mg immediate release tablet Take 1 tablet (10 mg) by mouth every 4 hours if needed for severe pain (7 - 10) (pain). 9/3/24   Erich Whaley MD   oxygen (O2) gas therapy Inhale 1 each continuously. 2/27/24   Marlene Harmon APRN-CNP   sennosides-docusate sodium (Nita-Colace) 8.6-50 mg tablet Take 2 tablets by mouth every 12 hours. 12/7/23   Historical Provider, MD   warfarin (Coumadin) 5 mg tablet Take 1 tablet (5 mg) by mouth once daily in the evening. 7/17/24 8/16/24  Erich Whaley MD   elderberry fruit 350 mg capsule Take 1 capsule by mouth once daily.  9/11/24  Historical Provider, MD   loratadine (Claritin) 10 mg tablet Take 1 tablet (10 mg) by mouth once  "daily as needed for allergies. 1/4/23 9/11/24  Historical Provider, MD   multivitamin with minerals tablet Take 1 tablet by mouth once daily. 12/7/23 9/11/24  Historical Provider, MD       Review of Systems   Eyes:  Negative for discharge and redness.   Gastrointestinal:  Positive for abdominal pain. Negative for blood in stool and constipation.   Genitourinary:  Negative for decreased urine volume, difficulty urinating and hematuria.   Musculoskeletal:  Positive for arthralgias.   Skin:  Positive for rash.   Pt sedated, full ROS not able to be performed.    Physical Exam  Constitutional:       Appearance: She is obese.   HENT:      Head: Normocephalic.      Nose: Nose normal.   Abdominal:      Palpations: Abdomen is soft.   Skin:     Coloration: Skin is not jaundiced or pale.      Comments: Sharply defined dark purple retiform patches across bilateral breasts superiorly but not inferiorly, upper anterior thighs, upper lateral trunk and bilateral flanks. Later examination revealed new purpura near these patches on the breasts as well as new retiform purple patches on the b/l dorsal distal toes/feet.    Few small bullae inferior lateral L breast, easily ruptured with clear fluid. Few small bullae lateral upper thighs, upper lateral R flank.     Last Recorded Vitals  Blood pressure 130/55, pulse 59, temperature 36.9 °C (98.4 °F), resp. rate 13, height 1.651 m (5' 5\"), weight 122 kg (268 lb 15.4 oz), SpO2 97%.    Relevant Results  Results from last 7 days   Lab Units 09/15/24  0457 09/12/24  1257 09/12/24  0441 09/11/24  2353 09/11/24  1754   WBC AUTO x10*3/uL 22.7*   < > 21.6*   < > 16.8*   HEMOGLOBIN g/dL 9.2*   < > 10.4*   < > 10.8*   HEMATOCRIT % 25.9*   < > 28.3*   < > 29.5*   PLATELETS AUTO x10*3/uL 63*   < > 79*   < > 83*   NEUTROS PCT AUTO %  --   --   --   --  89.8   LYMPHO PCT MAN %  --   --  0.0   < >  --    LYMPHS PCT AUTO %  --   --   --   --  1.8   MONO PCT MAN %  --   --  6.1   < >  --    MONOS PCT AUTO " %  --   --   --   --  6.7   EOSINO PCT MAN %  --   --  0.0   < >  --    EOS PCT AUTO %  --   --   --   --  0.2    < > = values in this interval not displayed.     Results from last 7 days   Lab Units 09/15/24  0457   SODIUM mmol/L 130*   POTASSIUM mmol/L 4.2   CHLORIDE mmol/L 93*   CO2 mmol/L 21   BUN mg/dL 64*   CREATININE mg/dL 4.80*   CALCIUM mg/dL 7.2*   PROTEIN TOTAL g/dL 4.6*   BILIRUBIN TOTAL mg/dL 16.2*   ALK PHOS U/L 249*   ALT U/L 583*   AST U/L 138*   GLUCOSE mg/dL 67*     Scheduled medications  diphenhydramine-zinc acetate, , Topical, TID  folic acid, 1 mg, oral, Daily  oxyCODONE, 10 mg, oral, q6h  pantoprazole, 40 mg, intravenous, Daily  piperacillin-tazobactam, 2.25 g, intravenous, q6h  polyethylene glycol, 17 g, oral, BID  sennosides, 2 tablet, oral, BID  thiamine, 100 mg, intravenous, Daily      Continuous medications  dexmedeTOMIDine, 0.1-1.5 mcg/kg/hr, Last Rate: 0.2 mcg/kg/hr (09/15/24 1321)  fentaNYL, 0-300 mcg/hr, Last Rate: 25 mcg/hr (09/15/24 0725)  heparin, 0-4,000 Units/hr, Last Rate: 1,400 Units/hr (09/15/24 0725)  norepinephrine, 0.01-1 mcg/kg/min, Last Rate: 0.15 mcg/kg/min (09/15/24 1416)  propofol, 5-50 mcg/kg/min, Last Rate: Stopped (09/12/24 1005)  vasopressin, 0.03 Units/min, Last Rate: 0.03 Units/min (09/15/24 1416)      PRN medications  PRN medications: albuterol, alteplase, dextrose, dextrose, fentaNYL, glucagon, glucagon, oxygen     Assessment/Plan   Senait Narvaez is a 53 year-old female currently in the MICU for multi-organ failure likely secondary to vaso-occlusive crisis related to her Hb SC disease vs sepsis. She has a significant hematologic history including regular exchange transfusions, recurrent DVT/PE on lifelong warfarin, pulmonary HTN, cauda equina, Raynaud's disease, and CKD II.    She is currently intubated, sedated on Precedex, but responsive, on pressors and Zosyn day 6 of 7 w/negative blood cx thus far. S/p RBC exchange transfusion 9/11.    DDx: Purpura caused by  infection vs coagulopathy vs vasculopathy vs small and/or medium-vessel vasculitis vs calciphylaxis  Given pt's hypercoagulable state with RBC dysfunction and acutely spreading rash as of day 2 today, above ddx likely. Fatty area involvement such as skin or thighs points more to heparin necrosis (less likely warfarin-induced given rx in hospital despite use of warfarin outpt). Distal extremity involvement points to embolic or cold-related etiology.  - possibilities range from microscopic polyangiitis, PAN, to calciphylaxis or drug-induced vasculitis  - less likely cryoglobulinemic vasculitis, heparin necrosis (vasculopathy)    Recommendations:  - pt's mother Tati CUMMINGS) agreed to skin punch biopsies. Two 4mm punch biopsy from the left breast were performed for H&E and direct immunofluorescence. She tolerated the procedures without complications. See procedure note.  - careful gauze covering placed over biopsy sites due to skin fragility in the area. Suture removal in 10-12 days.  - can contact derm to remove sutures if pt still admitted in 10-12 days or let us know to arrange outpt follow up for this  - derm consult resident to notify primary team of biopsy results. No need for derm outpt f/u at this time.  - agree continuing to r/o sepsis/infectious cause  - recommend labs: ANCAs (PR3, MPO), cryoglobulins     The patient was seen and discussed with attending physician Dr. Ratliff. The assessment and plan was verbally discussed with resident on Blue Team (primary) Dr. Rosario.    Patience White MD  Dermatology PGY2  Dermatology consult pager: [phone]    I saw and evaluated the patient. I personally obtained the key and critical portions of the history and physical exam or was physically present for key and critical portions performed by the resident/fellow. I reviewed the resident/fellow's documentation and discussed the patient with the resident/fellow. I agree with the resident/fellow's medical decision making as  documented in the note and made changes where appropriate.    Devi Ratliff MD

## 2024-12-23 ENCOUNTER — ANTICOAGULATION - WARFARIN VISIT (OUTPATIENT)
Dept: CARDIOLOGY | Facility: CLINIC | Age: 54
End: 2024-12-23
Payer: COMMERCIAL

## 2024-12-23 DIAGNOSIS — I82.4Z9 DEEP VEIN THROMBOSIS (DVT) OF DISTAL VEIN OF LOWER EXTREMITY, UNSPECIFIED CHRONICITY, UNSPECIFIED LATERALITY (MULTI): ICD-10-CM

## 2024-12-23 DIAGNOSIS — I26.99 RECURRENT PULMONARY EMBOLISM (MULTI): Primary | ICD-10-CM

## 2024-12-23 DIAGNOSIS — I82.210 THROMBOSIS OF SUPERIOR VENA CAVA (MULTI): ICD-10-CM

## 2024-12-24 LAB
INR IN PPP BY COAGULATION ASSAY EXTERNAL: 2
PROTHROMBIN TIME (PT) IN PPP BY COAGULATION ASSAY EXTERNAL: NORMAL

## 2024-12-24 NOTE — PROGRESS NOTES
Patient identification verified with 2 identifiers.    Location: [facility] Patient Self-Testing Program [phone]    Referring Physician: TOMAS Albert   Enrollment/ Re-enrollment date: 2/15/2025   INR Goal: 2.0-3.0  INR monitoring is per [facility] protocol.  Anticoagulation Medication: warfarin  Indication: Pulmonary Embolism (PE)    Subjective   Bleeding signs/symptoms: No    Bruising: No   Major bleeding event: No  Thrombosis signs/symptoms: No  Thromboembolic event: No  Missed doses: No  Extra doses: No  Medication changes: No  Dietary changes: No  Change in health: No  Change in activity: No  Alcohol: No  Other concerns: No    Upcoming Procedures:  Does the Patient Have any upcoming procedures that require interruption in anticoagulation therapy? no  Does the patient require bridging? no      Anticoagulation Summary  As of 2024      INR goal:  2.0-3.0   TTR:  88.1% (9.8 mo)   INR used for dosin.00 (2024)   Weekly warfarin total:  37.5 mg               Assessment/Plan   Therapeutic     1. New dose:  NO CHANGE     2. Next INR:  25      Education provided to patient during the visit:  Patient instructed to call in interim with questions, concerns and changes.

## 2024-12-30 ENCOUNTER — LAB (OUTPATIENT)
Dept: LAB | Facility: HOSPITAL | Age: 54
End: 2024-12-30
Payer: COMMERCIAL

## 2024-12-30 ENCOUNTER — OFFICE VISIT (OUTPATIENT)
Dept: HEMATOLOGY/ONCOLOGY | Facility: HOSPITAL | Age: 54
End: 2024-12-30
Payer: COMMERCIAL

## 2024-12-30 ENCOUNTER — ANTICOAGULATION - WARFARIN VISIT (OUTPATIENT)
Dept: CARDIOLOGY | Facility: CLINIC | Age: 54
End: 2024-12-30
Payer: COMMERCIAL

## 2024-12-30 VITALS
OXYGEN SATURATION: 95 % | HEART RATE: 90 BPM | TEMPERATURE: 96.8 F | WEIGHT: 205.4 LBS | SYSTOLIC BLOOD PRESSURE: 149 MMHG | BODY MASS INDEX: 34.18 KG/M2 | DIASTOLIC BLOOD PRESSURE: 76 MMHG

## 2024-12-30 DIAGNOSIS — I82.4Z9 DEEP VEIN THROMBOSIS (DVT) OF DISTAL VEIN OF LOWER EXTREMITY, UNSPECIFIED CHRONICITY, UNSPECIFIED LATERALITY (MULTI): ICD-10-CM

## 2024-12-30 DIAGNOSIS — I82.210 THROMBOSIS OF SUPERIOR VENA CAVA (MULTI): ICD-10-CM

## 2024-12-30 DIAGNOSIS — R06.02 SHORTNESS OF BREATH: ICD-10-CM

## 2024-12-30 DIAGNOSIS — N18.9 CHRONIC KIDNEY DISEASE, UNSPECIFIED CKD STAGE: Primary | ICD-10-CM

## 2024-12-30 DIAGNOSIS — D57.00 SICKLE CELL CRISIS (MULTI): ICD-10-CM

## 2024-12-30 DIAGNOSIS — D57.219 SICKLE-CELL-HEMOGLOBIN C DISEASE WITH CRISIS (MULTI): ICD-10-CM

## 2024-12-30 DIAGNOSIS — N18.9 CHRONIC KIDNEY DISEASE, UNSPECIFIED CKD STAGE: ICD-10-CM

## 2024-12-30 DIAGNOSIS — Z92.89 HISTORY OF EXCHANGE TRANSFUSION: ICD-10-CM

## 2024-12-30 DIAGNOSIS — I26.99 RECURRENT PULMONARY EMBOLISM (MULTI): Primary | ICD-10-CM

## 2024-12-30 LAB
ALBUMIN SERPL BCP-MCNC: 3.4 G/DL (ref 3.4–5)
ANION GAP SERPL CALC-SCNC: 14 MMOL/L (ref 10–20)
BUN SERPL-MCNC: 11 MG/DL (ref 6–23)
CALCIUM SERPL-MCNC: 9.1 MG/DL (ref 8.6–10.6)
CHLORIDE SERPL-SCNC: 104 MMOL/L (ref 98–107)
CO2 SERPL-SCNC: 23 MMOL/L (ref 21–32)
CREAT SERPL-MCNC: 0.76 MG/DL (ref 0.5–1.05)
EGFRCR SERPLBLD CKD-EPI 2021: >90 ML/MIN/1.73M*2
GLUCOSE SERPL-MCNC: 97 MG/DL (ref 74–99)
INR IN PPP BY COAGULATION ASSAY EXTERNAL: 3.3 (ref 2–3)
PHOSPHATE SERPL-MCNC: 3.8 MG/DL (ref 2.5–4.9)
POTASSIUM SERPL-SCNC: 4 MMOL/L (ref 3.5–5.3)
PROTHROMBIN TIME (PT) IN PPP BY COAGULATION ASSAY EXTERNAL: ABNORMAL
SODIUM SERPL-SCNC: 137 MMOL/L (ref 136–145)

## 2024-12-30 PROCEDURE — 36415 COLL VENOUS BLD VENIPUNCTURE: CPT

## 2024-12-30 PROCEDURE — G2211 COMPLEX E/M VISIT ADD ON: HCPCS | Performed by: PEDIATRICS

## 2024-12-30 PROCEDURE — 80069 RENAL FUNCTION PANEL: CPT

## 2024-12-30 PROCEDURE — 99215 OFFICE O/P EST HI 40 MIN: CPT | Performed by: PEDIATRICS

## 2024-12-30 RX ORDER — ALBUTEROL SULFATE 90 UG/1
2 INHALANT RESPIRATORY (INHALATION) EVERY 6 HOURS PRN
Qty: 18 G | Refills: 3 | Status: SHIPPED | OUTPATIENT
Start: 2024-12-30 | End: 2025-12-30

## 2024-12-30 RX ORDER — WARFARIN SODIUM 5 MG/1
5 TABLET ORAL EVERY EVENING
Qty: 30 TABLET | Refills: 3 | Status: SHIPPED | OUTPATIENT
Start: 2024-12-30 | End: 2025-01-29

## 2024-12-30 RX ORDER — OXYCODONE HYDROCHLORIDE 10 MG/1
10 TABLET ORAL EVERY 4 HOURS PRN
Qty: 75 TABLET | Refills: 0 | Status: SHIPPED | OUTPATIENT
Start: 2024-12-30

## 2024-12-30 ASSESSMENT — PAIN SCALES - GENERAL: PAINLEVEL_OUTOF10: 7

## 2024-12-30 NOTE — PROGRESS NOTES
Patient identification verified with 2 identifiers.    Location: [facility] Patient Self-Testing Program [phone]    Referring Physician: TOMAS Albert   Enrollment/ Re-enrollment date: 2/15/2025   INR Goal: 2.0-3.0  INR monitoring is per [facility] protocol.  Anticoagulation Medication: warfarin  Indication: Pulmonary Embolism (PE)    Subjective   Bleeding signs/symptoms: No    Bruising: No   Major bleeding event: No  Thrombosis signs/symptoms: No  Thromboembolic event: No  Missed doses: No  Extra doses: No  Medication changes: No  Dietary changes: Yes  Change in health: No  Change in activity: No  Alcohol: Yes  Other concerns: No    Upcoming Procedures:  Does the Patient Have any upcoming procedures that require interruption in anticoagulation therapy? no  Does the patient require bridging? no      Anticoagulation Summary  As of 12/30/2024      INR goal:  2.0-3.0   TTR:  87.9% (10 mo)   INR used for dosing:  3.30 (12/30/2024)   Weekly warfarin total:  37.5 mg               Assessment/Plan   Supratherapeutic     1. New dose: no change  Spoke with pt and confirmed dosing schedule.  Will continue unchanged d/t identified cause of supratherapeutic result.  2. Next INR: 1 week      Education provided to patient during the visit:  Patient instructed to call in interim with questions, concerns and changes.   Patient educated on interactions between medications and warfarin.   Patient educated on dietary consistency in vitamin k consumption.   Patient educated on affects of alcohol consumption while taking warfarin.   Patient educated on signs of bleeding/clotting.   Patient educated on compliance with dosing, follow up appointments, and prescribed plan of care.

## 2024-12-31 ENCOUNTER — APPOINTMENT (OUTPATIENT)
Dept: RADIOLOGY | Facility: HOSPITAL | Age: 54
End: 2024-12-31
Payer: COMMERCIAL

## 2024-12-31 ASSESSMENT — ENCOUNTER SYMPTOMS
EXTREMITY WEAKNESS: 0
WHEEZING: 0
EYE PROBLEMS: 0
MYALGIAS: 0
WOUND: 0
HEMOPTYSIS: 0
NAUSEA: 0
BACK PAIN: 1
DEPRESSION: 0
CONSTIPATION: 0
NERVOUS/ANXIOUS: 0
FATIGUE: 1
SORE THROAT: 0
VOMITING: 0
FREQUENCY: 0
LIGHT-HEADEDNESS: 0
BRUISES/BLEEDS EASILY: 0
ARTHRALGIAS: 1
ADENOPATHY: 0
PALPITATIONS: 0
FLANK PAIN: 0
NUMBNESS: 0
SCLERAL ICTERUS: 0
LEG SWELLING: 1
BLOOD IN STOOL: 0
CHEST TIGHTNESS: 0
DYSURIA: 0
DIAPHORESIS: 0
HEADACHES: 0
HEMATURIA: 0
CHILLS: 0
DIARRHEA: 0
COUGH: 0
ABDOMINAL PAIN: 0
FEVER: 0

## 2024-12-31 NOTE — PROGRESS NOTES
SICKLE CELL OUTPATIENT NOTE  Patient ID: Senait Narvaez   Visit Type: Follow up visit     ASSESSMENT AND PLAN  Senait Narvaez is 54 y.o. 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 She is on exchange transfusion every 5-6 weeks    Recurrent arrhythmia with recent SVT, managed with valsalva maneuver. She last had this about 5 days ago whilst at home   Recurrent VTE/PE with SVC syndrome, on extended duration anticoagulation with warfarin  Enlarged left breast with palpable mass. She is scheduled for an ultrasound of the breast later this week   History of CAN and nocturnal hypoxia on night time supplemental oxygen.   Chronic constipation  Acute kidney injury during recent admission. This has resolved on repeat renal panel today      PLAN   - No changes in her current home oral regimen and this is with tylenol 650 mg every 6 hours prn for mild to moderate pain   Oral oxycodone 10-20 mg every 4-6 hours as needed for moderate to severe and breakthrough pain    Non pharmacologic methods of pain control   Flexeril as needed as a muscle relaxant  - Reviewed OARRS  - Will reach out to her cardiologist to request for an earlier follow up in the setting of recurrent SVT.    - She will continue warfarin 5 mg daily with target INR of 2-3    - Continue 2L night time oxygen   - Daily folic acid 1 mg   - Colace and miralax prn for chronic constipation   - Continue metoprolol 12.5 mg BID  - Planned Ultrasound of the left breast as scheduled     Chief Complaint: Follow up for HB SC SCD, chronic pain and management of chronic red cell exchange transfusion      Interval History: Senait is present with her mother for follow up of HB SC SCD on chronic red cell exchange transfusion. She is overall stable  and her last red cell exchange transfusion went well. She had another episode of SVT about 5 days ago which resolved with valsalva maneuver. This is in addition to one she had during her recent admission to hospital. She denies chest pain or palpitations in clinic today. Chronic pain is unchanged from prior and rates pain at her baseline of 6-7/10. This is still generalized and worse in her lower back and extremities. Facial and neck swelling are unchanged from prior. Left breast pain, swelling and mass is still present, but mass feels softer. She she continues to tolerate warfarin anticoagulation for recurrent VTE without any major complications. She denies chest pain or chest tightness, headaches, dizziness, nausea or vomiting. She is afebrile, stooling well with prn laxatives. She denies nausea or vomiting, focal neurologic deficits or leg ulcers. She has been compliant with supplemental oxygen 2L at night.   Review of System:   Review of Systems   Constitutional:  Positive for fatigue. Negative for chills, diaphoresis and fever.   HENT:   Negative for mouth sores, nosebleeds and sore throat.    Eyes:  Negative for eye problems and icterus.   Respiratory:  Negative for chest tightness, cough, hemoptysis and wheezing.    Cardiovascular:  Positive for leg swelling. Negative for chest pain and palpitations.   Gastrointestinal:  Negative for abdominal pain, blood in stool, constipation, diarrhea, nausea and vomiting.   Genitourinary:  Negative for dysuria, frequency and hematuria.    Musculoskeletal:  Positive for arthralgias and back pain. Negative for flank pain, gait problem and myalgias.   Skin:  Negative for itching, rash and wound.   Neurological:  Negative for extremity weakness, gait problem, headaches, light-headedness and numbness.   Hematological:  Negative for adenopathy. Does not bruise/bleed easily.   Psychiatric/Behavioral:  Negative for depression. The patient is not nervous/anxious.       Allergies:    Allergies   Allergen Reactions    Vancomycin Rash    Oxycontin [Oxycodone] Drowsiness     Current Medications:   Medication Documentation Review Audit       Reviewed by Harjinder Ritter RN (Registered Nurse) on 12/13/24 at 0901      Medication Order Taking? Sig Documenting Provider Last Dose Status   albuterol 90 mcg/actuation inhaler [id number]  Inhale 2 puffs every 6 hours if needed for wheezing. Shayla Kaur PA-C  Active   ascorbic acid (Vitamin C) 500 mg chewable tablet [id number] Yes Chew 1 tablet (500 mg) once daily. As needed Medardo Wright MD 12/11/2024 Active   colchicine 0.6 mg tablet [id number]  Take 1 tablet (0.6 mg) by mouth 2 times a day. Continue to take for acute gout flare. Stop 48 hours after gout flare Do not fill before October 16, 2024. Sonia Kerns, APRN-CNP  Active   cyclobenzaprine (Flexeril) 10 mg tablet [id number] Yes Take 1 tablet (10 mg) by mouth 3 times a day as needed for muscle spasms. Erich Whaley MD 12/11/2024 Active   fluticasone (Flonase) 50 mcg/actuation nasal spray [id number]  Administer 2 sprays into each nostril once daily as needed for rhinitis or allergies. Historical Provider, MD  Active   folic acid (Folvite) 1 mg tablet [id number] Yes Take 1 tablet (1 mg) by mouth once daily. Medardo Provider, MD 12/12/2024 Active   furosemide (Lasix) 20 mg tablet [id number] Yes Take 1 tablet (20 mg) by mouth every other day. Ankur White,  12/11/2024 Active   loratadine (Claritin) 10 mg tablet [id number]  Take 1 tablet (10 mg) by mouth once daily. 1 tab as needed Historical Provider, MD  Active   metoprolol tartrate (Lopressor) 25 mg tablet [id number]  Take 0.5 tablets (12.5 mg) by mouth 2 times a day. Medardo Provider, MD  Active   multivitamin tablet [id number] Yes Take 1 tablet by mouth once daily. Medardo Wright MD 12/11/2024 Active   naloxone (Narcan) 4 mg/0.1 mL nasal spray [id number]  Administer 1 spray (4 mg) into affected nostril(s) if needed for  opioid reversal. May repeat every 2-3 minutes if needed, alternating nostrils, until medical assistance becomes available. TOMAS Mendez  Active   oxyCODONE (Roxicodone) 10 mg immediate release tablet [id number]  Take 1 tablet (10 mg) by mouth every 4 hours if needed for severe pain (7 - 10) (pain). Erich Whaley MD  Active   oxygen (O2) gas therapy [id number]  Inhale 1 each continuously.   Patient taking differently: Inhale 1 L/min continuously.    TOMAS Contreras  Active   oxygen (O2) gas therapy [id number]  Inhale 1 each once every 24 hours. Radha Ferrell PA-C  Active   oxygen (O2) gas therapy [id number]  Inhale 1 each continuously. Radha Ferrell PA-C  Active   traZODone (Desyrel) 50 mg tablet [id number] Yes Take 1 tablet (50 mg) by mouth as needed at bedtime for sleep. Lisa Giles MD Past Week  24 7927   triamcinolone (Kenalog) 0.1 % cream [id number]  Apply topically 2 times a day. Apply triamcinolone 0.1% cream to both the black plaques as well as the red areas across the trunk and thighs TOMAS De  Active   warfarin (Coumadin) 5 mg tablet [id number] Yes Take 1 tablet (5 mg) by mouth once daily in the evening. Lisa Giles MD 2024 Active   white petrolatum (Aquaphor) 41 % ointment ointment [id number]  Apply 2 Applications topically 2 times a day. Apply vaseline to all eroded/denuded areas. 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   History of retinal detachment, likely  secondary to sickle cell disease.   Obstructive Sleep and significant nocturnal hemoglobin desaturation    Bilateral lower extremity edema, recurrent  SVC syndrome S/P balloon angioplasty of SVC/left brachiocephalic vein, removal of left sided Mediport catheter (1/21/20)  Syncopal episodes   Balloon removed via IR on April 2023.   SVT event with conversion with 1 dose 6mg of adenosine on 5/5/23  Admission June 2023 underwent RHC/LHC on 6/16 with mPA pressure 36, wedge 14, CI 4.2 and her coronary angiogram revealed no angiographic evidence of obstructive CAD. Dx of pulm HTN.     Family History:  Family History   Problem Relation    Diabetes Father    Gout Father    Sickle cell trait Father    Seizures Sister    Diabetes Other    Uterine cancer Other    Other (congenital blindness) Cousin     Social History:   Senait Narvaez  reports that she quit smoking about 11 years ago. Her smoking use included cigarettes. She has been exposed to tobacco smoke. She has never used smokeless tobacco.  reports that she does not currently use drugs.    EXAMINATION FINDINGS   /76 (BP Location: Left arm, Patient Position: Sitting, BP Cuff Size: Adult)   Pulse 90   Temp 36 °C (96.8 °F) (Temporal)   Wt 93.2 kg (205 lb 6.4 oz)   SpO2 95%   BMI 34.18 kg/m²   2.07 meters squared    Physical Exam  Vitals and nursing note reviewed.   Constitutional:       General: She is not in acute distress.     Appearance: She is not toxic-appearing.   Neck:      Vascular: No carotid bruit.      Comments: SVC syndrome with distension of her neck veins  Cardiovascular:      Rate and Rhythm: Normal rate and regular rhythm.      Pulses: Normal pulses.      Heart sounds: No murmur heard.     No gallop.   Pulmonary:      Effort: Pulmonary effort is normal. No respiratory distress.      Breath sounds: Normal breath sounds. No wheezing.   Chest:      Comments: Left breast swollen with palpable mass. Right breast is normal.   Abdominal:      General: Bowel  sounds are normal.      Palpations: Abdomen is soft.      Tenderness: There is no abdominal tenderness. There is no guarding.   Musculoskeletal:         General: No swelling or deformity.      Cervical back: No rigidity or tenderness.      Right lower leg: No edema.      Left lower leg: No edema.      Comments: Swelling of RLE. Peripheral pulses are present and normal bilaterally    Lymphadenopathy:      Cervical: No cervical adenopathy.   Skin:     General: Skin is warm.      Capillary Refill: Capillary refill takes less than 2 seconds.      Findings: No lesion or rash.   Neurological:      General: No focal deficit present.      Mental Status: She is alert and oriented to person, place, and time. Mental status is at baseline.      Cranial Nerves: No cranial nerve deficit.      Motor: No weakness.      Gait: Gait normal.   Psychiatric:         Mood and Affect: Mood normal.         Behavior: Behavior normal.       LABS   Latest Reference Range & Units 12/30/24 10:53   GLUCOSE 74 - 99 mg/dL 97   SODIUM 136 - 145 mmol/L 137   POTASSIUM 3.5 - 5.3 mmol/L 4.0   CHLORIDE 98 - 107 mmol/L 104   Bicarbonate 21 - 32 mmol/L 23   Anion Gap 10 - 20 mmol/L 14   Blood Urea Nitrogen 6 - 23 mg/dL 11   Creatinine 0.50 - 1.05 mg/dL 0.76   EGFR >60 mL/min/1.73m*2 >90   Calcium 8.6 - 10.6 mg/dL 9.1   PHOSPHORUS 2.5 - 4.9 mg/dL 3.8   Albumin 3.4 - 5.0 g/dL 3.4        Erich Whaley MD

## 2025-01-02 ENCOUNTER — TELEPHONE (OUTPATIENT)
Dept: HEMATOLOGY/ONCOLOGY | Facility: HOSPITAL | Age: 55
End: 2025-01-02
Payer: COMMERCIAL

## 2025-01-02 NOTE — TELEPHONE ENCOUNTER
Call made to pt to confirm full exchange date. Senait informed to come to [facility] on 1/17 for lab work. Pt will be admitted for full exchange on 1/20. Pt acknowledged understanding.

## 2025-01-03 ENCOUNTER — HOSPITAL ENCOUNTER (OUTPATIENT)
Dept: RADIOLOGY | Facility: HOSPITAL | Age: 55
Discharge: HOME | End: 2025-01-03
Payer: COMMERCIAL

## 2025-01-03 DIAGNOSIS — N64.89 BREAST EDEMA: ICD-10-CM

## 2025-01-03 PROCEDURE — 76983 USE EA ADDL TARGET LESION: CPT | Mod: LT

## 2025-01-03 PROCEDURE — 76641 ULTRASOUND BREAST COMPLETE: CPT | Mod: LT

## 2025-01-06 ENCOUNTER — ANTICOAGULATION - WARFARIN VISIT (OUTPATIENT)
Dept: CARDIOLOGY | Facility: CLINIC | Age: 55
End: 2025-01-06
Payer: COMMERCIAL

## 2025-01-06 ENCOUNTER — TELEPHONE (OUTPATIENT)
Dept: ADMISSION | Facility: HOSPITAL | Age: 55
End: 2025-01-06
Payer: COMMERCIAL

## 2025-01-06 DIAGNOSIS — I82.210 THROMBOSIS OF SUPERIOR VENA CAVA (MULTI): ICD-10-CM

## 2025-01-06 DIAGNOSIS — I82.4Z9 DEEP VEIN THROMBOSIS (DVT) OF DISTAL VEIN OF LOWER EXTREMITY, UNSPECIFIED CHRONICITY, UNSPECIFIED LATERALITY (MULTI): ICD-10-CM

## 2025-01-06 DIAGNOSIS — I26.99 RECURRENT PULMONARY EMBOLISM (MULTI): Primary | ICD-10-CM

## 2025-01-06 NOTE — TELEPHONE ENCOUNTER
Pt is requesting are return call from Meredith to discuss 1/20 IR appt.   Pt is under impression she is to be admitted to have her port placed.   Please reach out to pt to discuss.

## 2025-01-07 NOTE — TELEPHONE ENCOUNTER
[facility] called to schedule transportation for pt's upcoming appts. Phone number to call the office left with the  who states she will give the message to the nurse.    Medicare Annual Wellness Visit    Benny Benson Jr is here for Medicare AWV, Hyperlipidemia, Hypertension, and Diabetes    Assessment & Plan   Medicare annual wellness visit, subsequent  Type 2 diabetes mellitus with other specified complication, without long-term current use of insulin (HCC)  -     POCT glycosylated hemoglobin (Hb A1C)  -     POCT microalbumin  -     POCT Glucose  Essential hypertension  Paroxysmal atrial fibrillation (HCC)  H/O lung transplant (HCC)  Stage 3 chronic kidney disease, unspecified whether stage 3a or 3b CKD (HCC)  History of DVT and Pulmonary Emboli (deep vein thrombosis)       Return in about 3 months (around 4/7/2025) for DM.     Subjective   The following acute and/or chronic problems were also addressed today:  DM type II      Patient's complete Health Risk Assessment and screening values have been reviewed and are found in Flowsheets. The following problems were reviewed today and where indicated follow up appointments were made and/or referrals ordered.    Positive Risk Factor Screenings with Interventions:    Fall Risk:  Do you feel unsteady or are you worried about falling? : (!) yes  2 or more falls in past year?: no  Fall with injury in past year?: no     Interventions:    Patient comments: still gets dizzy with standing x 10 seconds  Reviewed medications, home hazards, visual acuity, and co-morbidities that can increase risk for falls         Controlled Medication Review:    Today's Pain Level: No data recorded   Opioid Risk: (Low risk score <55) Opioid risk score: 17    Patient is low risk for opioid use disorder or overdose.    Last PDMP [place] as Reviewed:  Review User Review Instant Review Result   HUSSEIN NICOLE 10/17/2024  4:16 PM     Reviewed PDMP [1]     Last Controlled Substance Monitoring Documentation      Flowsheet Row Refill from 7/15/2024 in [facility]   Periodic Controlled Substance Monitoring No signs of potential drug abuse or

## 2025-01-08 ENCOUNTER — TELEPHONE (OUTPATIENT)
Dept: HEMATOLOGY/ONCOLOGY | Facility: HOSPITAL | Age: 55
End: 2025-01-08
Payer: COMMERCIAL

## 2025-01-08 DIAGNOSIS — R92.8 OTHER ABNORMAL AND INCONCLUSIVE FINDINGS ON DIAGNOSTIC IMAGING OF BREAST: Primary | ICD-10-CM

## 2025-01-08 DIAGNOSIS — R92.8 ABNORMAL FINDING ON BREAST IMAGING: ICD-10-CM

## 2025-01-08 LAB
INR IN PPP BY COAGULATION ASSAY EXTERNAL: 3.2
PROTHROMBIN TIME (PT) IN PPP BY COAGULATION ASSAY EXTERNAL: NORMAL

## 2025-01-08 NOTE — PROGRESS NOTES
Patient identification verified with 2 identifiers.    Location: [facility] Patient Self-Testing Program [phone]    Referring Physician: TOMAS Albert   Enrollment/ Re-enrollment date: 2/15/2025   INR Goal: 2.0-3.0  INR monitoring is per [facility] protocol.  Anticoagulation Medication: warfarin  Indication: Pulmonary Embolism (PE)    Subjective   Bleeding signs/symptoms: No    Bruising: No   Major bleeding event: No  Thrombosis signs/symptoms: No  Thromboembolic event: No  Missed doses: No  Extra doses: No  Medication changes: No  Dietary changes: No  Change in health: No  Change in activity: No  Alcohol: No  Other concerns: No    Upcoming Procedures:  Does the Patient Have any upcoming procedures that require interruption in anticoagulation therapy? no  Does the patient require bridging? no      Anticoagulation Summary  As of 1/6/2025      INR goal:  2.0-3.0   TTR:  87.9% (10 mo)   INR used for dosing:  3.20 (1/8/2025)   Weekly warfarin total:  35 mg               Assessment/Plan   Supratherapeutic     1. New dose:  Decreased TWD     2. Next INR: 1 week  - Spoke to patient and confirmed new dosing instructions      Education provided to patient during the visit:  Patient instructed to call in interim with questions, concerns and changes.   Patient educated on interactions between medications and warfarin.   Patient educated on dietary consistency in vitamin k consumption.   Patient educated on affects of alcohol consumption while taking warfarin.   Patient educated on signs of bleeding/clotting.

## 2025-01-08 NOTE — TELEPHONE ENCOUNTER
----- Message from Ankur White sent at 12/31/2024 12:18 PM EST -----  Her metoprolol can be increased from 12.5 BID --> 25 mg BID  ----- Message -----  From: Erich Whaley MD  Sent: 12/31/2024  12:15 PM EST  To: Ankur White DO; #    Hello Dr. White     Happy new year to you. Wanted to reach out to you on Senait. She has had 2 episodes of SVT in the past 2 weeks which were reversed with valsalva maneuver and continues to have intermittent palpitations. She is still on metoprolol and lasix. She is scheduled to see you in feb, 2025 and wanted to be update you on the new SVT and if you will like to see her sooner.     Thanks in advance for your help. Please reply to all.     OLIVERIO Whaley

## 2025-01-08 NOTE — TELEPHONE ENCOUNTER
Called Zehra Sapp and advised to increase metoprolol to 25 mg daily per recommendation of cardiology on account of SVT. She will follow up with cardiology as scheduled.     Also informed her about my request to postpone breast biopsy to after her red cell exchange transfusion. She will however follow up with the breast team on 1/17 as scheduled

## 2025-01-09 DIAGNOSIS — Z92.89 HISTORY OF EXCHANGE TRANSFUSION: ICD-10-CM

## 2025-01-09 DIAGNOSIS — D57.00 SICKLE CELL DISEASE WITH CRISIS (MULTI): Primary | ICD-10-CM

## 2025-01-09 DIAGNOSIS — I21.4 NSTEMI (NON-ST ELEVATED MYOCARDIAL INFARCTION) (MULTI): ICD-10-CM

## 2025-01-14 ENCOUNTER — TELEPHONE (OUTPATIENT)
Dept: ADMISSION | Facility: HOSPITAL | Age: 55
End: 2025-01-14
Payer: COMMERCIAL

## 2025-01-14 DIAGNOSIS — D57.00 SICKLE CELL CRISIS (MULTI): ICD-10-CM

## 2025-01-14 RX ORDER — OXYCODONE HYDROCHLORIDE 10 MG/1
10 TABLET ORAL EVERY 4 HOURS PRN
Qty: 75 TABLET | Refills: 0 | Status: ON HOLD | OUTPATIENT
Start: 2025-01-14

## 2025-01-14 RX ORDER — NALOXONE HYDROCHLORIDE 4 MG/.1ML
1 SPRAY NASAL AS NEEDED
Qty: 2 EACH | Refills: 0 | Status: ON HOLD | OUTPATIENT
Start: 2025-01-14

## 2025-01-15 ENCOUNTER — ANTICOAGULATION - WARFARIN VISIT (OUTPATIENT)
Dept: CARDIOLOGY | Facility: CLINIC | Age: 55
End: 2025-01-15
Payer: COMMERCIAL

## 2025-01-15 DIAGNOSIS — I26.99 RECURRENT PULMONARY EMBOLISM (MULTI): Primary | ICD-10-CM

## 2025-01-15 DIAGNOSIS — I82.4Z9 DEEP VEIN THROMBOSIS (DVT) OF DISTAL VEIN OF LOWER EXTREMITY, UNSPECIFIED CHRONICITY, UNSPECIFIED LATERALITY (MULTI): ICD-10-CM

## 2025-01-15 DIAGNOSIS — I82.210 THROMBOSIS OF SUPERIOR VENA CAVA (MULTI): ICD-10-CM

## 2025-01-15 LAB
INR IN PPP BY COAGULATION ASSAY EXTERNAL: 2.8
PROTHROMBIN TIME (PT) IN PPP BY COAGULATION ASSAY EXTERNAL: NORMAL

## 2025-01-15 NOTE — PROGRESS NOTES
Patient identification verified with 2 identifiers.    Location: [facility] Patient Self-Testing Program [phone]    Referring Physician: Erich Whaley MD   Enrollment/ Re-enrollment date: 2/15/2025   INR Goal: 2.0-3.0  INR monitoring is per [facility] protocol.  Anticoagulation Medication: warfarin  Indication: Deep Vein Thrombosis (DVT) and Pulmonary Embolism (PE)    Subjective   Bleeding signs/symptoms: No    Bruising: No   Major bleeding event: No  Thrombosis signs/symptoms: No  Thromboembolic event: No  Missed doses: No  Extra doses: No  Medication changes: No  Dietary changes: No  Change in health: No  Change in activity: No  Alcohol: No  Other concerns: No    Upcoming Procedures:  Does the Patient Have any upcoming procedures that require interruption in anticoagulation therapy? yes  Does the patient require bridging? no      Anticoagulation Summary  As of 1/15/2025      INR goal:  2.0-3.0   TTR:  84.5% (10.5 mo)   INR used for dosin.80 (1/15/2025)   Weekly warfarin total:  35 mg               Assessment/Plan   Therapeutic     1. New dose: no change    2. Next INR:  25      Education provided to patient during the visit:  Patient instructed to call in interim with questions, concerns and changes.

## 2025-01-16 ENCOUNTER — DOCUMENTATION (OUTPATIENT)
Dept: HEMATOLOGY/ONCOLOGY | Facility: HOSPITAL | Age: 55
End: 2025-01-16
Payer: COMMERCIAL

## 2025-01-16 NOTE — PROGRESS NOTES
CHW spoke with patient to arrange transportation via roundtrip for appointment 1/17.  No further services requested at this time.

## 2025-01-16 NOTE — PROGRESS NOTES
Patient ID: Senait Narvaez is a 54 y.o. female.    The patient presents to clinic today for H & P prior to breast biopsy.     History of Present Illness (HPI)/Interval History:  Ms. Senait Narvaez is a 54 y.o. female [facility] consultation.     She was admitted in Oct 2024 for sickle cell pain crisis. Found to have swelling and skin changes of the left breast and skin changes of the right breast. Underwent a diagnostic mammogram and left breast US. This showed diffuse skin thickening of the left breast with lightly prominent left axillary LN that has been stable since prior exams. The US showed diffuse skin thickening and parenchymal edema. No focal masses or lesions. Close follow up recommended.     She was admitted again 12/12/2024 - 12/16/2024 due to cardiomyopathy and arrhythmia. Noted again to have diffuse left breast edema. A follow up US was done 1/3/2025:   - 12:00 11 cm from nipple shows anechoic parallel benign cyst 1.3 x 0.7 x 0.4 cm, avascular and soft on elastography.   - 11:00 10 cm from nipple shows hypoechoic mass 1.5 x 1.8 x 0.4 cm with soft to indeterminate stiffness on elastography   - 3:00 10 cm from nipple shows a benign prominent intramammary LN 1.0 x 1.1 x 0.5 cm  with a vascular fatty hilum and soft on elastography   - 2:00 9 cm from nipple shows intramammary LN measuring 1.0 x 1.1 x 0.5 cm with a vascular fatty hilum and soft on elastography  - 1:00 3 cm from nipple vague heterogenous mass measuring up to 7 cm and hard on elastography - has been recommended for bx   - LN #3 evaluated showed a persevered  vascular fatty hilum with increased cortical thickening measuring up to 5 cm. Remaining LN 1-2 and 4-7 were all normal appearing. LN #3 has been recommended for bx     She admits to skin changes and swelling of the left breast.  She denies any prior breast surgery or biopsies. She denies any family hx of breast or ovarian cancer.     PMH: cardiomyopathy.  Sickle cell disease - requiring transfusion exchange about every 5-6 weeks, Recurrent PE/DVTs with SVC syndrome, recurrent arrhythmia with recent SVT, hx of multi-system organ failure, CAN  Family hx: father passed of cancer unk type, sister had lung cancer passed at 24 y/o. Maternal aunt passed from unk cancer   Social hx: Tobacco use x ~ 30 years, quite 10 years ago. Denies any alcohol or other drug use   Surgical hx: Right oophorostomy at 15 with appendectomy, eye surgery at [age]  Medications: see med list   Allergies: oxycodone and vancomycin     Review of Systems:  14-point ROS otherwise negative, as per HPI.    Past Medical History:   Diagnosis Date    Asthma     CHF (congestive heart failure)     Chronic pain disorder     Compression of vein 02/19/2020    Superior vena cava syndrome    Hypotension, unspecified 12/10/2020       Past Surgical History:   Procedure Laterality Date    APPENDECTOMY  08/05/2013    Appendectomy    BIOPSY  9/15/2024    CHOLECYSTECTOMY  08/05/2013    Cholecystectomy    CT ANGIO NECK  10/19/2022    CT NECK ANGIO W AND WO IV CONTRAST 10/19/2022 [facility] CLINICAL LEGACY    CT GUIDED PERCUTANEOUS BIOPSY LYMPH NODE SUPERFICIAL  9/19/2019    CT GUIDED PERCUTANEOUS BIOPSY LYMPH NODE SUPERFICIAL 9/19/2019 [facility] INPATIENT LEGACY    IR CVC NONTUNNELED  10/26/2023    IR CVC NONTUNNELED 10/26/2023 [facility] ANGIO    IR CVC NONTUNNELED  12/7/2023    IR CVC NONTUNNELED 12/7/2023 Marcos Moser MD [facility] ANGIO    IR CVC TUNNELED  3/13/2015    IR CVC TUNNELED 3/13/2015 [facility] CLINICAL LEGACY    IR CVC TUNNELED  1/29/2016    IR CVC TUNNELED 1/29/2016 [facility] AIB LEGACY    IR CVC TUNNELED  1/17/2018    IR CVC TUNNELED 1/17/2018 [facility] AIB LEGACY    IR CVC TUNNELED  2/22/2018    IR CVC TUNNELED 2/22/2018 [facility] AIB LEGACY    IR CVC TUNNELED  3/22/2018    IR CVC TUNNELED 3/22/2018 [facility] CLINICAL LEGACY    IR CVC TUNNELED  4/18/2018    IR CVC TUNNELED 4/18/2018 [facility] AIB LEGACY    IR CVC TUNNELED  5/16/2018    IR CVC TUNNELED  5/16/2018 [facility] AIB LEGACY    IR CVC TUNNELED  6/12/2018    IR CVC TUNNELED 6/12/2018 [facility] AIB LEGACY    IR CVC TUNNELED  1/21/2020    IR CVC TUNNELED 1/21/2020 [facility] INPATIENT LEGACY    IR CVC TUNNELED  2/28/2020    IR CVC TUNNELED 2/28/2020 [facility] ANCILLARY LEGACY    IR CVC TUNNELED  4/10/2020    IR CVC TUNNELED 4/10/2020 [facility] AIB LEGACY    IR CVC TUNNELED  5/22/2020    IR CVC TUNNELED 5/22/2020 [facility] ANCILLARY LEGACY    IR CVC TUNNELED  6/19/2020    IR CVC TUNNELED 6/19/2020 [facility] AIB LEGACY    IR CVC TUNNELED  7/23/2020    IR CVC TUNNELED 7/23/2020 [facility] AIB LEGACY    IR CVC TUNNELED  9/4/2020    IR CVC TUNNELED 9/4/2020 [facility] AIB LEGACY    IR CVC TUNNELED  10/9/2020    IR CVC TUNNELED 10/9/2020 [facility] ANCILLARY LEGACY    IR CVC TUNNELED  11/13/2020    IR CVC TUNNELED 11/13/2020 [facility] AIB LEGACY    IR CVC TUNNELED  12/18/2020    IR CVC TUNNELED 12/18/2020 [facility] AIB LEGACY    IR CVC TUNNELED  1/22/2021    IR CVC TUNNELED 1/22/2021 [facility] AIB LEGACY    IR CVC TUNNELED  2/26/2021    IR CVC TUNNELED 2/26/2021 [facility] CLINICAL LEGACY    IR CVC TUNNELED  4/2/2021    IR CVC TUNNELED 4/2/2021 [facility] AIB LEGACY    IR CVC TUNNELED  5/7/2021    IR CVC TUNNELED 5/7/2021 [facility] ANCILLARY LEGACY    IR CVC TUNNELED  6/11/2021    IR CVC TUNNELED 6/11/2021 [facility] AIB LEGACY    IR CVC TUNNELED  7/16/2021    IR CVC TUNNELED 7/16/2021 [facility] ANCILLARY LEGACY    IR CVC TUNNELED  8/20/2021    IR CVC TUNNELED 8/20/2021 [facility] AIB LEGACY    IR CVC TUNNELED  9/24/2021    IR CVC TUNNELED 9/24/2021 [facility] AIB LEGACY    IR CVC TUNNELED  10/29/2021    IR CVC TUNNELED 10/29/2021 [facility] AIB LEGACY    IR CVC TUNNELED  12/3/2021    IR CVC TUNNELED 12/3/2021 [facility] AIB LEGACY    IR CVC TUNNELED  1/7/2022    IR CVC TUNNELED 1/7/2022 [facility] ANCILLARY LEGACY    IR CVC TUNNELED  2/23/2022    IR CVC TUNNELED 2/23/2022 [facility] CLINICAL LEGACY    IR CVC TUNNELED  3/25/2022    IR CVC TUNNELED 3/25/2022 [facility] ANCILLARY LEGACY    IR CVC TUNNELED  4/22/2022    IR CVC TUNNELED 4/22/2022 [facility] ANCILLARY LEGACY    IR CVC  TUNNELED  6/3/2022    IR CVC TUNNELED 6/3/2022 [facility] ANCILLARY LEGACY    IR CVC TUNNELED  9/18/2017    IR CVC TUNNELED 9/18/2017 [facility] AIB LEGACY    IR CVC TUNNELED  10/30/2017    IR CVC TUNNELED 10/30/2017 [facility] AIB LEGACY    IR CVC TUNNELED  12/19/2017    IR CVC TUNNELED 12/19/2017 [facility] AIB LEGACY    IR CVC TUNNELED  1/6/2023    IR CVC TUNNELED 1/6/2023 [facility] LEGAC[facility]    IR CVC TUNNELED  2/24/2023    IR CVC TUNNELED [facility] ANGIO    IR CVC TUNNELED  7/8/2022    IR CVC TUNNELED 7/8/2022 [facility] ANCILLARY LEGACY    IR CVC TUNNELED  8/12/2022    IR CVC TUNNELED 8/12/2022 [email or web address] CLINICAL LEGACY    IR CVC TUNNELED  9/16/2022    IR CVC TUNNELED 9/16/2022 [facility] ANCILLARY LEGACY    IR CVC TUNNELED  10/20/2022    IR CVC TUNNELED 10/20/2022 [facility] [[email or web address] CLINICAL LEGACY    IR CVC TUNNELED  11/29/2022    IR CVC TUNNELED 11/29/2022 [facility] ANCILLARY LEGACY    IR CVC TUNNELED  3/31/2023    IR CVC TUNNELED [facility] ANGIO    IR CVC TUNNELED  6/9/2023    IR CVC TUNNELED 6/9/2023 [facility] ANGIO    IR CVC TUNNELED  7/20/2023    IR CVC TUNNELED 7/20/2023 [facility] ANGIO    IR CVC TUNNELED  8/16/2023    IR CVC TUNNELED 8/16/2023 [facility] ANGIO    IR CVC TUNNELED  9/21/2023    IR CVC TUNNELED 9/21/2023 [facility] ANGIO    MR HEAD ANGIO WO IV CONTRAST  8/16/2014    MR HEAD ANGIO WO IV CONTRAST 8/16/2014 [facility] ANCILLARY LEGACY    MR NECK ANGIO WO IV CONTRAST  8/16/2014    MR NECK ANGIO WO IV CONTRAST 8/16/2014 [facility] ANCILLARY LEGACY    OTHER SURGICAL HISTORY  05/13/2014    Fluid Drained, Retina Inspected: Breaks Supported By Buckle    OTHER SURGICAL HISTORY  10/09/2014    Closed Treatment Of Fracture Of The Lateral Malleolus    OTHER SURGICAL HISTORY  06/09/2014    Salpingo-oophorectomy Right Side    US GUIDED BIOPSY LYMPH NODE SUPERFICIAL  9/17/2019    US GUIDED BIOPSY LYMPH NODE SUPERFICIAL 9/17/2019 [facility] INPATIENT LEGACY       Social History     Socioeconomic History    Marital status: Single   Tobacco Use    Smoking status: Former     Current packs/day: 0.00     Types: Cigarettes     Quit  date:      Years since quittin.0     Passive exposure: Past    Smokeless tobacco: Never   Substance and Sexual Activity    Alcohol use: Not Currently    Drug use: Not Currently     Social Drivers of Health     Financial Resource Strain: Low Risk  (2024)    Overall Financial Resource Strain (CARDIA)     Difficulty of Paying Living Expenses: Not hard at all   Food Insecurity: No Food Insecurity (2024)    Hunger Vital Sign     Worried About Running Out of Food in the Last Year: Never true     Ran Out of Food in the Last Year: Never true   Transportation Needs: No Transportation Needs (2024)    PRAPARE - Transportation     Lack of Transportation (Medical): No     Lack of Transportation (Non-Medical): No   Physical Activity: Patient Declined (10/26/2023)    Exercise Vital Sign     Days of Exercise per Week: Patient declined     Minutes of Exercise per Session: Patient declined   Social Connections: Feeling Socially Integrated (2024)    OASIS : Social Isolation     Frequency of experiencing loneliness or isolation: Never   Intimate Partner Violence: Not At Risk (2024)    Humiliation, Afraid, Rape, and Kick questionnaire     Fear of Current or Ex-Partner: No     Emotionally Abused: No     Physically Abused: No     Sexually Abused: No   Housing Stability: Low Risk  (2024)    Housing Stability Vital Sign     Unable to Pay for Housing in the Last Year: No     Number of Times Moved in the Last Year: 0     Homeless in the Last Year: No       Allergies   Allergen Reactions    Vancomycin Rash    Oxycontin [Oxycodone] Drowsiness         Current Outpatient Medications:     albuterol 90 mcg/actuation inhaler, Inhale 2 puffs every 6 hours if needed for wheezing., Disp: 18 g, Rfl: 3    ascorbic acid (Vitamin C) 500 mg chewable tablet, Chew 1 tablet (500 mg) once daily. As needed, Disp: , Rfl:     colchicine 0.6 mg tablet, Take 1 tablet (0.6 mg) by mouth 2 times a day. Continue to take  for acute gout flare. Stop 48 hours after gout flare Do not fill before October 16, 2024., Disp: , Rfl:     cyclobenzaprine (Flexeril) 10 mg tablet, Take 1 tablet (10 mg) by mouth 3 times a day as needed for muscle spasms., Disp: 30 tablet, Rfl: 3    fluticasone (Flonase) 50 mcg/actuation nasal spray, Administer 2 sprays into each nostril once daily as needed for rhinitis or allergies., Disp: , Rfl:     folic acid (Folvite) 1 mg tablet, Take 1 tablet (1 mg) by mouth once daily., Disp: , Rfl:     furosemide (Lasix) 20 mg tablet, Take 1 tablet (20 mg) by mouth every other day., Disp: 45 tablet, Rfl: 3    loratadine (Claritin) 10 mg tablet, Take 1 tablet (10 mg) by mouth once daily. 1 tab as needed, Disp: , Rfl:     metoprolol tartrate (Lopressor) 25 mg tablet, Take 0.5 tablets (12.5 mg) by mouth 2 times a day., Disp: , Rfl:     multivitamin tablet, Take 1 tablet by mouth once daily., Disp: , Rfl:     naloxone (Narcan) 4 mg/0.1 mL nasal spray, Administer 1 spray (4 mg) into affected nostril(s) if needed for opioid reversal. May repeat every 2-3 minutes if needed, alternating nostrils, until medical assistance becomes available., Disp: 2 each, Rfl: 0    oxyCODONE (Roxicodone) 10 mg immediate release tablet, Take 1 tablet (10 mg) by mouth every 4 hours if needed for severe pain (7 - 10) (pain)., Disp: 75 tablet, Rfl: 0    oxygen (O2) gas therapy, Inhale 1 each continuously. (Patient taking differently: Inhale 1 L/min continuously.), Disp: 1 each, Rfl: 0    traZODone (Desyrel) 50 mg tablet, Take 1 tablet (50 mg) by mouth as needed at bedtime for sleep., Disp: 30 tablet, Rfl: 0    triamcinolone (Kenalog) 0.1 % cream, Apply topically 2 times a day. Apply triamcinolone 0.1% cream to both the black plaques as well as the red areas across the trunk and thighs, Disp: , Rfl:     warfarin (Coumadin) 5 mg tablet, Take 1 tablet (5 mg) by mouth once daily in the evening., Disp: 30 tablet, Rfl: 3    white petrolatum (Aquaphor) 41 %  ointment ointment, Apply 2 Applications topically 2 times a day. Apply vaseline to all eroded/denuded areas. Mepilex transfer sheets may be used for areas of friction, Disp: , Rfl:      Objective    BSA: There is no height or weight on file to calculate BSA.  There were no vitals taken for this visit.    Performance Status:  The ECOG performance scale today is ECO- Fully active, able to carry on all pre-disease performance w/o restriction.    Physical Exam  Vitals reviewed.   Constitutional:       General: She is awake. She is not in acute distress.     Appearance: Normal appearance. She is not toxic-appearing.   HENT:      Head: Normocephalic and atraumatic.      Mouth/Throat:      Mouth: Mucous membranes are moist.      Pharynx: Oropharynx is clear.   Eyes:      Conjunctiva/sclera: Conjunctivae normal.      Pupils: Pupils are equal, round, and reactive to light.   Neck:      Trachea: Trachea and phonation normal. No tracheal tenderness.   Cardiovascular:      Rate and Rhythm: Normal rate and regular rhythm.      Pulses: Normal pulses.      Heart sounds: Normal heart sounds. No murmur heard.  Pulmonary:      Effort: Pulmonary effort is normal. No respiratory distress.      Breath sounds: Normal breath sounds.   Chest:      Chest wall: No mass or deformity.   Breasts:     Right: Mass (Centrally located breast density) present. No swelling, nipple discharge, skin change or tenderness.      Left: Mass (does have a large centrally to upper outer quadrant mass - difficult to distinguish between cysts and index mass) and skin change (hypopigmented lesions 2/2 reaction from vancomycin) present. No swelling, nipple discharge or tenderness.      Comments:     Abdominal:      General: Bowel sounds are normal. There is no distension.      Palpations: Abdomen is soft. There is no mass.      Tenderness: There is no abdominal tenderness. There is no guarding.   Musculoskeletal:         General: Normal range of motion.       Cervical back: Normal range of motion.   Lymphadenopathy:      Upper Body:      Right upper body: No supraclavicular, axillary or pectoral adenopathy.      Left upper body: No supraclavicular, axillary (unable to palpate any enlagred LN) or pectoral adenopathy.   Skin:     General: Skin is warm and dry.      Capillary Refill: Capillary refill takes less than 2 seconds.      Findings: No rash.   Neurological:      General: No focal deficit present.      Mental Status: She is alert and oriented to person, place, and time.      Motor: No weakness.   Psychiatric:         Mood and Affect: Mood normal.         Behavior: Behavior normal.         Thought Content: Thought content normal.         Judgment: Judgment normal.         Laboratory Data:  Lab Results   Component Value Date    WBC 10.9 12/16/2024    HGB 9.3 (L) 12/16/2024    HCT 28.6 (L) 12/16/2024    MCV 86 12/16/2024     12/16/2024    ANC 11.05 (H) 09/25/2024       Chemistry    Lab Results   Component Value Date/Time     12/30/2024 1053    K 4.0 12/30/2024 1053     12/30/2024 1053    CO2 23 12/30/2024 1053    BUN 11 12/30/2024 1053    CREATININE 0.76 12/30/2024 1053    Lab Results   Component Value Date/Time    CALCIUM 9.1 12/30/2024 1053    ALKPHOS 132 (H) 12/16/2024 0148    AST 20 12/16/2024 0148    ALT 7 12/16/2024 0148    BILITOT 1.8 (H) 12/16/2024 0148             Radiology:  BI US breast complete left  Narrative: Interpreted By:  Debbie Magallon and Marshall Colin   STUDY:  BI US BREAST COMPLETE LEFT;  1/3/2025 11:52 am      ACCESSION NUMBER(S):  [id number]      ORDERING CLINICIAN:  SANDRINE CASAS      INDICATION:  The patient returns for continued left breast swelling which has not  resolved since prior diagnostic mammogram and ultrasound dated  10/16/2024.      COMPARISON:  Mammogram and ultrasound dated 10/16/2024. Mammogram dated 08/16/2023  and 01/18/2021.      FINDINGS:  Targeted ultrasound was performed of the left breast  and left axilla  by a registered sonographer with elastography.      At 12 o'clock 11 cm from the nipple, there is an oval circumscribed  parallel benign anechoic cyst measuring 1.3 x 0.7 x 0.4 cm which is  avascular and soft on elastography.      At 11 o'clock 10 cm from nipple, there is an oval circumscribed  hypoechoic mass measuring 1.5 x 1.8 x 0.4 cm which is avascular and  soft to intermediate stiffness on elastography.      There is diffuse heterogeneity of the remaining breast parenchyma  scanned in the superomedial and inferior medial quadrants without a  discrete abnormality visualized. The tissue was intermediate  stiffness on elastography. There is also diffuse heterogeneity of the  remaining breast parenchyma scant in the superolateral and  inferolateral quadrants without discrete lesions identified. The  tissue is soft on elastography.      At 3 o'clock 10 cm from the nipple, there is a prominent benign  intramammary lymph node measuring 0.8 x 1.0 x 0.4 cm which is soft on  elastography and demonstrates a vascular fatty hilum.      At 2 o'clock 9 cm from the nipple, there is an additional benign  intramammary lymph node measuring 1.0 x 1.1 x 0.5 cm which  demonstrates a vascular fatty hilum and is soft on elastography.      At 1 o'clock 3 cm from the nipple and extending into the subareolar  region, there is a vague heterogeneous mass measuring up to 7 cm in  diameter which is hard on elastography.      The lymph node labeled #3 demonstrates a preserved vascular fatty  hilum with increased cortical thickness measuring up to 5 cm in  diameter. The remainder of the visualized left axillary lymph nodes  labeled #1-2 and #4-7 demonstrate preserved vascular fatty nir and  normal cortical thickness.      Impression: 1. Indeterminate left breast mass at 1 o'clock extending to the  subareolar region and indeterminate left breast mass at 11 o'clock.  Further evaluation with surgical consultation and  ultrasound-guided  biopsy is recommended. Dr. Drew Ash and Dr. Debbie Magallon  explained the findings and recommendations to the patient at the time  of exam. Additionally, the patient was unable to stay for the  remainder of the exam including a left breast mammogram and right  axillary ultrasound. Therefore, these exams should be completed on  the same day of the scheduled biopsy.  2. Indeterminate left axillary lymph nodes labeled #3. Current  recommendation is for an ultrasound-guided biopsy but this may be  modified pending right axillary ultrasound result.  3. Benign left breast cyst at 12 o'clock and normal left breast  intramammary lymph nodes at 2 o'clock and 3 o'clock.      Method of Detection: Category Pat - Patient Reported Self-examination  Finding      BI-RADS CATEGORY:  BI-RADS Category:  4 Suspicious.  Recommendation:  Surgical Consultation and Biopsy.  Recommended Date:  Immediate.  Laterality:  Left.      For any future breast imaging appointments, please call [phone]).      I personally reviewed the images/study and I agree with the breast  imaging fellow, Drew Ash D.O., findings as stated. This study  was interpreted at [place], [place].      MACRO:  Critical Finding:  See findings. Notification was initiated on  1/3/2025 at 3:19 pm by  Debbie Magallon.  (**-YCF-**)  Instructions:  Surgical Consultation and Imaging Guided Biopsy.      Signed by: Debbie Magallon 1/3/2025 3:19 PM  Dictation workstation:   [id number]       BI US breast complete left 01/03/2025    Narrative  Interpreted By:  Debbie Magallon and Marshall Colin  STUDY:  BI US BREAST COMPLETE LEFT;  1/3/2025 11:52 am    ACCESSION NUMBER(S):  [id number]    ORDERING CLINICIAN:  SANDRINE CASAS    INDICATION:  The patient returns for continued left breast swelling which has not  resolved since prior diagnostic mammogram and ultrasound  dated  10/16/2024.    COMPARISON:  Mammogram and ultrasound dated 10/16/2024. Mammogram dated 08/16/2023  and 01/18/2021.    FINDINGS:  Targeted ultrasound was performed of the left breast and left axilla  by a registered sonographer with elastography.    At 12 o'clock 11 cm from the nipple, there is an oval circumscribed  parallel benign anechoic cyst measuring 1.3 x 0.7 x 0.4 cm which is  avascular and soft on elastography.    At 11 o'clock 10 cm from nipple, there is an oval circumscribed  hypoechoic mass measuring 1.5 x 1.8 x 0.4 cm which is avascular and  soft to intermediate stiffness on elastography.    There is diffuse heterogeneity of the remaining breast parenchyma  scanned in the superomedial and inferior medial quadrants without a  discrete abnormality visualized. The tissue was intermediate  stiffness on elastography. There is also diffuse heterogeneity of the  remaining breast parenchyma scant in the superolateral and  inferolateral quadrants without discrete lesions identified. The  tissue is soft on elastography.    At 3 o'clock 10 cm from the nipple, there is a prominent benign  intramammary lymph node measuring 0.8 x 1.0 x 0.4 cm which is soft on  elastography and demonstrates a vascular fatty hilum.    At 2 o'clock 9 cm from the nipple, there is an additional benign  intramammary lymph node measuring 1.0 x 1.1 x 0.5 cm which  demonstrates a vascular fatty hilum and is soft on elastography.    At 1 o'clock 3 cm from the nipple and extending into the subareolar  region, there is a vague heterogeneous mass measuring up to 7 cm in  diameter which is hard on elastography.    The lymph node labeled #3 demonstrates a preserved vascular fatty  hilum with increased cortical thickness measuring up to 5 cm in  diameter. The remainder of the visualized left axillary lymph nodes  labeled #1-2 and #4-7 demonstrate preserved vascular fatty nir and  normal cortical thickness.    Impression  1. Indeterminate left  breast mass at 1 o'clock extending to the  subareolar region and indeterminate left breast mass at 11 o'clock.  Further evaluation with surgical consultation and ultrasound-guided  biopsy is recommended. Dr. Drew Ash and Dr. Debbie Magallon  explained the findings and recommendations to the patient at the time  of exam. Additionally, the patient was unable to stay for the  remainder of the exam including a left breast mammogram and right  axillary ultrasound. Therefore, these exams should be completed on  the same day of the scheduled biopsy.  2. Indeterminate left axillary lymph nodes labeled #3. Current  recommendation is for an ultrasound-guided biopsy but this may be  modified pending right axillary ultrasound result.  3. Benign left breast cyst at 12 o'clock and normal left breast  intramammary lymph nodes at 2 o'clock and 3 o'clock.    Method of Detection: Category Pat - Patient Reported Self-examination  Finding    BI-RADS CATEGORY:  BI-RADS Category:  4 Suspicious.  Recommendation:  Surgical Consultation and Biopsy.  Recommended Date:  Immediate.  Laterality:  Left.    For any future breast imaging appointments, please call [phone]  [phone]).    I personally reviewed the images/study and I agree with the breast  imaging fellow, Drew Ash D.O., findings as stated. This study  was interpreted at [facility],  [place], [place].    MACRO:  Critical Finding:  See findings. Notification was initiated on  1/3/2025 at 3:19 pm by  Debbie Magallon.  (**-YCF-**)  Instructions:  Surgical Consultation and Imaging Guided Biopsy.    Signed by: Debbie Magallon 1/3/2025 3:19 PM  Dictation workstation:   [id number]         Assessment/Plan: left mass with associated LAD in 1 of 7 LN      PLAN:  Thank you for coming to see me today at [facility]. I recommend left breast and left axillary LN ultrasound guided biopsy. A breast radiology physician  will perform the biopsy. Possible diagnoses include benign, atypical, or cancer as we discussed.  Bruising and mild discomfort after the biopsy is normal and will improve. I normally have results in 3-5 business days. I will call you with results, please have your phone handy to take my call.    - Will also complete imaging of right breast mammogram and right axillary US same day     IMPORTANT INFORMATION REGARDING YOUR RESULTS  If you receive medical information from [facility] Personal Health Record (online chart) your results will be immediately released into your chart. This means you may view or see results of your biopsy or procedure before I contact you directly. If this occurs, please call the office and we can discuss your results over the phone.     You can see your health information, review clinical summaries from office visits & test results online when you follow your health with MY  Care, a personal health record. To sign up go to [email or web address]. If you need assistance with signing up or trouble getting into your account call [phone] from 8am-8pm.    Should you have any questions or concerns after biopsy, please do not hesitate to call my office at [phone]. If it has been more than a week since your biopsy was performed and you have not been given the results, please call my office [phone]. Thank you for choosing [facility] and trusting me as your healthcare provider. I am honored to be a provider on your health care team and I remain dedicated to helping you achieve your health goals.    Disposition.  - RTC pending biopsy results   - She was given our contact information and instructed to call with concerns/questions in the interim.    Gracy Barr PA-C  Hematology and Medical Oncology  [facility]

## 2025-01-17 ENCOUNTER — LAB (OUTPATIENT)
Dept: LAB | Facility: HOSPITAL | Age: 55
DRG: 812 | End: 2025-01-17
Payer: COMMERCIAL

## 2025-01-17 ENCOUNTER — OFFICE VISIT (OUTPATIENT)
Dept: HEMATOLOGY/ONCOLOGY | Facility: HOSPITAL | Age: 55
End: 2025-01-17
Payer: COMMERCIAL

## 2025-01-17 ENCOUNTER — TELEPHONE (OUTPATIENT)
Dept: HEMATOLOGY/ONCOLOGY | Facility: HOSPITAL | Age: 55
End: 2025-01-17

## 2025-01-17 VITALS
SYSTOLIC BLOOD PRESSURE: 117 MMHG | RESPIRATION RATE: 20 BRPM | HEIGHT: 65 IN | DIASTOLIC BLOOD PRESSURE: 75 MMHG | BODY MASS INDEX: 35.74 KG/M2 | TEMPERATURE: 97.7 F | OXYGEN SATURATION: 96 % | HEART RATE: 88 BPM | WEIGHT: 214.51 LBS

## 2025-01-17 DIAGNOSIS — D57.219 SICKLE-CELL-HEMOGLOBIN C DISEASE WITH CRISIS (MULTI): ICD-10-CM

## 2025-01-17 DIAGNOSIS — D57.219 SICKLE-CELL-HEMOGLOBIN C DISEASE WITH CRISIS (MULTI): Primary | ICD-10-CM

## 2025-01-17 DIAGNOSIS — R92.8 ABNORMAL FINDING ON BREAST IMAGING: ICD-10-CM

## 2025-01-17 DIAGNOSIS — N63.25 MASS OVERLAPPING MULTIPLE QUADRANTS OF LEFT BREAST: Primary | ICD-10-CM

## 2025-01-17 LAB
ALBUMIN SERPL BCP-MCNC: 3.4 G/DL (ref 3.4–5)
ALP SERPL-CCNC: 147 U/L (ref 33–110)
ALT SERPL W P-5'-P-CCNC: 14 U/L (ref 7–45)
ANION GAP SERPL CALC-SCNC: 10 MMOL/L (ref 10–20)
AST SERPL W P-5'-P-CCNC: 24 U/L (ref 9–39)
BASOPHILS # BLD AUTO: 0.02 X10*3/UL (ref 0–0.1)
BASOPHILS NFR BLD AUTO: 0.2 %
BILIRUB SERPL-MCNC: 1 MG/DL (ref 0–1.2)
BUN SERPL-MCNC: 12 MG/DL (ref 6–23)
BURR CELLS BLD QL SMEAR: NORMAL
CA-I BLD-SCNC: 1.21 MMOL/L (ref 1.1–1.33)
CALCIUM SERPL-MCNC: 9.2 MG/DL (ref 8.6–10.3)
CHLORIDE SERPL-SCNC: 100 MMOL/L (ref 98–107)
CO2 SERPL-SCNC: 30 MMOL/L (ref 21–32)
CREAT SERPL-MCNC: 0.95 MG/DL (ref 0.5–1.05)
DACRYOCYTES BLD QL SMEAR: NORMAL
EGFRCR SERPLBLD CKD-EPI 2021: 71 ML/MIN/1.73M*2
EOSINOPHIL # BLD AUTO: 0.36 X10*3/UL (ref 0–0.7)
EOSINOPHIL NFR BLD AUTO: 4.2 %
ERYTHROCYTE [DISTWIDTH] IN BLOOD BY AUTOMATED COUNT: 26.1 % (ref 11.5–14.5)
FERRITIN SERPL-MCNC: 42 NG/ML (ref 8–150)
GLUCOSE SERPL-MCNC: 101 MG/DL (ref 74–99)
HCT VFR BLD AUTO: 33.6 % (ref 36–46)
HGB BLD-MCNC: 11.4 G/DL (ref 12–16)
HGB RETIC QN: 24 PG (ref 28–38)
HOWELL-JOLLY BOD BLD QL SMEAR: PRESENT
IMM GRANULOCYTES # BLD AUTO: 0.02 X10*3/UL (ref 0–0.7)
IMM GRANULOCYTES NFR BLD AUTO: 0.2 % (ref 0–0.9)
IMMATURE RETIC FRACTION: 9.3 %
LDH SERPL L TO P-CCNC: 181 U/L (ref 84–246)
LYMPHOCYTES # BLD AUTO: 2.18 X10*3/UL (ref 1.2–4.8)
LYMPHOCYTES NFR BLD AUTO: 25.6 %
MCH RBC QN AUTO: 25.8 PG (ref 26–34)
MCHC RBC AUTO-ENTMCNC: 33.9 G/DL (ref 32–36)
MCV RBC AUTO: 76 FL (ref 80–100)
MONOCYTES # BLD AUTO: 0.65 X10*3/UL (ref 0.1–1)
MONOCYTES NFR BLD AUTO: 7.6 %
NEUTROPHILS # BLD AUTO: 5.3 X10*3/UL (ref 1.2–7.7)
NEUTROPHILS NFR BLD AUTO: 62.2 %
NRBC BLD-RTO: 2.1 /100 WBCS (ref 0–0)
OVALOCYTES BLD QL SMEAR: NORMAL
PAPPENHEIMER BOD BLD QL SMEAR: PRESENT
PLATELET # BLD AUTO: 370 X10*3/UL (ref 150–450)
PLATELET CLUMP BLD QL SMEAR: PRESENT
POLYCHROMASIA BLD QL SMEAR: NORMAL
POTASSIUM SERPL-SCNC: 4.3 MMOL/L (ref 3.5–5.3)
PROT SERPL-MCNC: 7.5 G/DL (ref 6.4–8.2)
RBC # BLD AUTO: 4.42 X10*6/UL (ref 4–5.2)
RBC MORPH BLD: NORMAL
RETICS #: 0.29 X10*6/UL (ref 0.02–0.08)
RETICS/RBC NFR AUTO: 6.7 % (ref 0.5–2)
SCHISTOCYTES BLD QL SMEAR: NORMAL
SODIUM SERPL-SCNC: 136 MMOL/L (ref 136–145)
TARGETS BLD QL SMEAR: NORMAL
WBC # BLD AUTO: 8.5 X10*3/UL (ref 4.4–11.3)

## 2025-01-17 PROCEDURE — 83615 LACTATE (LD) (LDH) ENZYME: CPT

## 2025-01-17 PROCEDURE — 85025 COMPLETE CBC W/AUTO DIFF WBC: CPT

## 2025-01-17 PROCEDURE — 83021 HEMOGLOBIN CHROMOTOGRAPHY: CPT

## 2025-01-17 PROCEDURE — 85045 AUTOMATED RETICULOCYTE COUNT: CPT

## 2025-01-17 PROCEDURE — 82330 ASSAY OF CALCIUM: CPT

## 2025-01-17 PROCEDURE — 82728 ASSAY OF FERRITIN: CPT

## 2025-01-17 PROCEDURE — 83020 HEMOGLOBIN ELECTROPHORESIS: CPT | Performed by: PATHOLOGY

## 2025-01-17 PROCEDURE — 99214 OFFICE O/P EST MOD 30 MIN: CPT

## 2025-01-17 PROCEDURE — 36415 COLL VENOUS BLD VENIPUNCTURE: CPT

## 2025-01-17 PROCEDURE — 3008F BODY MASS INDEX DOCD: CPT

## 2025-01-17 PROCEDURE — 84075 ASSAY ALKALINE PHOSPHATASE: CPT

## 2025-01-17 ASSESSMENT — PAIN SCALES - GENERAL: PAINLEVEL_OUTOF10: 8

## 2025-01-18 LAB
HEMOGLOBIN A2: 3 % (ref 2–3.5)
HEMOGLOBIN A: 48.7 % (ref 95.8–98)
HEMOGLOBIN C: 23.7 %
HEMOGLOBIN F: 0.4 % (ref 0–2)
HEMOGLOBIN IDENTIFICATION INTERPRETATION: ABNORMAL
HEMOGLOBIN S: 24.2 %
PATH REVIEW-HGB IDENTIFICATION: ABNORMAL

## 2025-01-19 ENCOUNTER — HOSPITAL ENCOUNTER (INPATIENT)
Facility: HOSPITAL | Age: 55
DRG: 812 | End: 2025-01-19
Attending: INTERNAL MEDICINE
Payer: COMMERCIAL

## 2025-01-19 VITALS
DIASTOLIC BLOOD PRESSURE: 77 MMHG | HEIGHT: 65 IN | HEART RATE: 89 BPM | BODY MASS INDEX: 35.65 KG/M2 | TEMPERATURE: 97.5 F | OXYGEN SATURATION: 93 % | RESPIRATION RATE: 18 BRPM | WEIGHT: 214 LBS | SYSTOLIC BLOOD PRESSURE: 112 MMHG

## 2025-01-19 DIAGNOSIS — G47.34 NOCTURNAL HYPOXIA: ICD-10-CM

## 2025-01-19 DIAGNOSIS — D57.00 SICKLE CELL ANEMIA WITH PAIN (MULTI): ICD-10-CM

## 2025-01-19 DIAGNOSIS — D57.1 SICKLE CELL ANEMIA WITHOUT CRISIS (MULTI): ICD-10-CM

## 2025-01-19 DIAGNOSIS — I82.4Z9 DEEP VEIN THROMBOSIS (DVT) OF DISTAL VEIN OF LOWER EXTREMITY, UNSPECIFIED CHRONICITY, UNSPECIFIED LATERALITY (MULTI): Primary | ICD-10-CM

## 2025-01-19 LAB
FLUAV RNA RESP QL NAA+PROBE: NOT DETECTED
FLUBV RNA RESP QL NAA+PROBE: NOT DETECTED
SARS-COV-2 RNA RESP QL NAA+PROBE: NOT DETECTED

## 2025-01-19 PROCEDURE — 87636 SARSCOV2 & INF A&B AMP PRB: CPT

## 2025-01-19 PROCEDURE — 2500000004 HC RX 250 GENERAL PHARMACY W/ HCPCS (ALT 636 FOR OP/ED)

## 2025-01-19 PROCEDURE — 2500000001 HC RX 250 WO HCPCS SELF ADMINISTERED DRUGS (ALT 637 FOR MEDICARE OP)

## 2025-01-19 PROCEDURE — 1170000001 HC PRIVATE ONCOLOGY ROOM DAILY

## 2025-01-19 PROCEDURE — 99223 1ST HOSP IP/OBS HIGH 75: CPT

## 2025-01-19 RX ORDER — DOCUSATE SODIUM 100 MG/1
100 CAPSULE, LIQUID FILLED ORAL 2 TIMES DAILY PRN
Status: DISCONTINUED | OUTPATIENT
Start: 2025-01-19 | End: 2025-01-19

## 2025-01-19 RX ORDER — METOPROLOL TARTRATE 25 MG/1
12.5 TABLET, FILM COATED ORAL 2 TIMES DAILY
Status: DISCONTINUED | OUTPATIENT
Start: 2025-01-19 | End: 2025-01-20

## 2025-01-19 RX ORDER — ONDANSETRON 4 MG/1
4 TABLET, FILM COATED ORAL EVERY 8 HOURS PRN
Status: DISCONTINUED | OUTPATIENT
Start: 2025-01-19 | End: 2025-01-24 | Stop reason: HOSPADM

## 2025-01-19 RX ORDER — COLCHICINE 0.6 MG/1
0.6 TABLET ORAL 2 TIMES DAILY
Status: CANCELLED | OUTPATIENT
Start: 2025-01-19

## 2025-01-19 RX ORDER — DIPHENHYDRAMINE HCL 25 MG
25 CAPSULE ORAL EVERY 6 HOURS PRN
Status: DISCONTINUED | OUTPATIENT
Start: 2025-01-19 | End: 2025-01-24 | Stop reason: HOSPADM

## 2025-01-19 RX ORDER — CETIRIZINE HYDROCHLORIDE 10 MG/1
10 TABLET ORAL DAILY
Status: CANCELLED | OUTPATIENT
Start: 2025-01-19

## 2025-01-19 RX ORDER — NALOXONE HYDROCHLORIDE 0.4 MG/ML
0.2 INJECTION, SOLUTION INTRAMUSCULAR; INTRAVENOUS; SUBCUTANEOUS AS NEEDED
Status: DISCONTINUED | OUTPATIENT
Start: 2025-01-19 | End: 2025-01-24 | Stop reason: HOSPADM

## 2025-01-19 RX ORDER — NALOXONE HYDROCHLORIDE 4 MG/.1ML
4 SPRAY NASAL AS NEEDED
Status: DISCONTINUED | OUTPATIENT
Start: 2025-01-19 | End: 2025-01-19

## 2025-01-19 RX ORDER — OXYCODONE HYDROCHLORIDE 10 MG/1
10 TABLET ORAL EVERY 4 HOURS PRN
Status: DISCONTINUED | OUTPATIENT
Start: 2025-01-19 | End: 2025-01-23

## 2025-01-19 RX ORDER — HYDROMORPHONE HYDROCHLORIDE 1 MG/ML
1 INJECTION, SOLUTION INTRAMUSCULAR; INTRAVENOUS; SUBCUTANEOUS EVERY 4 HOURS PRN
Status: DISCONTINUED | OUTPATIENT
Start: 2025-01-19 | End: 2025-01-21

## 2025-01-19 RX ORDER — FLUTICASONE PROPIONATE 50 MCG
2 SPRAY, SUSPENSION (ML) NASAL DAILY PRN
Status: CANCELLED | OUTPATIENT
Start: 2025-01-19

## 2025-01-19 RX ORDER — FUROSEMIDE 20 MG/1
20 TABLET ORAL EVERY OTHER DAY
Status: DISCONTINUED | OUTPATIENT
Start: 2025-01-20 | End: 2025-01-24 | Stop reason: HOSPADM

## 2025-01-19 RX ORDER — WARFARIN SODIUM 5 MG/1
5 TABLET ORAL DAILY
Status: DISCONTINUED | OUTPATIENT
Start: 2025-01-19 | End: 2025-01-24 | Stop reason: HOSPADM

## 2025-01-19 RX ORDER — CYCLOBENZAPRINE HCL 10 MG
10 TABLET ORAL 3 TIMES DAILY PRN
Status: DISCONTINUED | OUTPATIENT
Start: 2025-01-19 | End: 2025-01-24 | Stop reason: HOSPADM

## 2025-01-19 RX ORDER — FOLIC ACID 1 MG/1
1 TABLET ORAL DAILY
Status: CANCELLED | OUTPATIENT
Start: 2025-01-19

## 2025-01-19 RX ORDER — POLYETHYLENE GLYCOL 3350 17 G/17G
17 POWDER, FOR SOLUTION ORAL 2 TIMES DAILY PRN
Status: DISCONTINUED | OUTPATIENT
Start: 2025-01-19 | End: 2025-01-19

## 2025-01-19 RX ORDER — ALBUTEROL SULFATE 90 UG/1
2 INHALANT RESPIRATORY (INHALATION) EVERY 6 HOURS PRN
Status: DISCONTINUED | OUTPATIENT
Start: 2025-01-19 | End: 2025-01-24 | Stop reason: HOSPADM

## 2025-01-19 RX ORDER — CETIRIZINE HYDROCHLORIDE 10 MG/1
10 TABLET ORAL DAILY
Status: DISCONTINUED | OUTPATIENT
Start: 2025-01-19 | End: 2025-01-24 | Stop reason: HOSPADM

## 2025-01-19 RX ORDER — ALBUTEROL SULFATE 90 UG/1
2 INHALANT RESPIRATORY (INHALATION) EVERY 6 HOURS PRN
Status: CANCELLED | OUTPATIENT
Start: 2025-01-19

## 2025-01-19 RX ORDER — FUROSEMIDE 20 MG/1
20 TABLET ORAL EVERY OTHER DAY
Status: CANCELLED | OUTPATIENT
Start: 2025-01-20

## 2025-01-19 RX ORDER — CYCLOBENZAPRINE HCL 10 MG
10 TABLET ORAL 3 TIMES DAILY PRN
Status: DISCONTINUED | OUTPATIENT
Start: 2025-01-19 | End: 2025-01-19 | Stop reason: SDUPTHER

## 2025-01-19 RX ORDER — PETROLATUM 420 MG/G
OINTMENT TOPICAL
Status: DISCONTINUED | OUTPATIENT
Start: 2025-01-19 | End: 2025-01-24 | Stop reason: HOSPADM

## 2025-01-19 RX ORDER — CYCLOBENZAPRINE HCL 10 MG
10 TABLET ORAL 3 TIMES DAILY PRN
Status: CANCELLED | OUTPATIENT
Start: 2025-01-19

## 2025-01-19 RX ORDER — POLYETHYLENE GLYCOL 3350 17 G/17G
17 POWDER, FOR SOLUTION ORAL DAILY PRN
Status: DISCONTINUED | OUTPATIENT
Start: 2025-01-19 | End: 2025-01-24 | Stop reason: HOSPADM

## 2025-01-19 RX ORDER — FOLIC ACID 1 MG/1
1 TABLET ORAL DAILY
Status: DISCONTINUED | OUTPATIENT
Start: 2025-01-19 | End: 2025-01-24 | Stop reason: HOSPADM

## 2025-01-19 RX ORDER — FLUTICASONE PROPIONATE 50 MCG
2 SPRAY, SUSPENSION (ML) NASAL DAILY PRN
Status: DISCONTINUED | OUTPATIENT
Start: 2025-01-19 | End: 2025-01-24 | Stop reason: HOSPADM

## 2025-01-19 RX ADMIN — METOPROLOL TARTRATE 12.5 MG: 25 TABLET, FILM COATED ORAL at 21:45

## 2025-01-19 RX ADMIN — HYDROMORPHONE HYDROCHLORIDE 1 MG: 1 INJECTION, SOLUTION INTRAMUSCULAR; INTRAVENOUS; SUBCUTANEOUS at 22:49

## 2025-01-19 RX ADMIN — OXYCODONE HYDROCHLORIDE 10 MG: 10 TABLET ORAL at 21:45

## 2025-01-19 RX ADMIN — OXYCODONE HYDROCHLORIDE 10 MG: 10 TABLET ORAL at 17:41

## 2025-01-19 RX ADMIN — HYDROMORPHONE HYDROCHLORIDE 1 MG: 1 INJECTION, SOLUTION INTRAMUSCULAR; INTRAVENOUS; SUBCUTANEOUS at 18:49

## 2025-01-19 SDOH — HEALTH STABILITY: MENTAL HEALTH: HOW OFTEN DO YOU HAVE SIX OR MORE DRINKS ON ONE OCCASION?: NEVER

## 2025-01-19 SDOH — SOCIAL STABILITY: SOCIAL INSECURITY: DO YOU FEEL ANYONE HAS EXPLOITED OR TAKEN ADVANTAGE OF YOU FINANCIALLY OR OF YOUR PERSONAL PROPERTY?: NO

## 2025-01-19 SDOH — ECONOMIC STABILITY: INCOME INSECURITY: IN THE PAST 12 MONTHS HAS THE ELECTRIC, GAS, OIL, OR WATER COMPANY THREATENED TO SHUT OFF SERVICES IN YOUR HOME?: NO

## 2025-01-19 SDOH — SOCIAL STABILITY: SOCIAL INSECURITY: HAVE YOU HAD ANY THOUGHTS OF HARMING ANYONE ELSE?: NO

## 2025-01-19 SDOH — SOCIAL STABILITY: SOCIAL INSECURITY: WITHIN THE LAST YEAR, HAVE YOU BEEN AFRAID OF YOUR PARTNER OR EX-PARTNER?: NO

## 2025-01-19 SDOH — SOCIAL STABILITY: SOCIAL INSECURITY: HAS ANYONE EVER THREATENED TO HURT YOUR FAMILY OR YOUR PETS?: NO

## 2025-01-19 SDOH — ECONOMIC STABILITY: FOOD INSECURITY: WITHIN THE PAST 12 MONTHS, YOU WORRIED THAT YOUR FOOD WOULD RUN OUT BEFORE YOU GOT THE MONEY TO BUY MORE.: NEVER TRUE

## 2025-01-19 SDOH — SOCIAL STABILITY: SOCIAL INSECURITY: WITHIN THE LAST YEAR, HAVE YOU BEEN HUMILIATED OR EMOTIONALLY ABUSED IN OTHER WAYS BY YOUR PARTNER OR EX-PARTNER?: NO

## 2025-01-19 SDOH — HEALTH STABILITY: MENTAL HEALTH: HOW OFTEN DO YOU HAVE A DRINK CONTAINING ALCOHOL?: NEVER

## 2025-01-19 SDOH — SOCIAL STABILITY: SOCIAL INSECURITY: HAVE YOU HAD THOUGHTS OF HARMING ANYONE ELSE?: NO

## 2025-01-19 SDOH — SOCIAL STABILITY: SOCIAL INSECURITY: DO YOU FEEL UNSAFE GOING BACK TO THE PLACE WHERE YOU ARE LIVING?: NO

## 2025-01-19 SDOH — SOCIAL STABILITY: SOCIAL INSECURITY: ARE YOU OR HAVE YOU BEEN THREATENED OR ABUSED PHYSICALLY, EMOTIONALLY, OR SEXUALLY BY ANYONE?: NO

## 2025-01-19 SDOH — ECONOMIC STABILITY: FOOD INSECURITY: WITHIN THE PAST 12 MONTHS, THE FOOD YOU BOUGHT JUST DIDN'T LAST AND YOU DIDN'T HAVE MONEY TO GET MORE.: NEVER TRUE

## 2025-01-19 SDOH — SOCIAL STABILITY: SOCIAL INSECURITY: WERE YOU ABLE TO COMPLETE ALL THE BEHAVIORAL HEALTH SCREENINGS?: YES

## 2025-01-19 SDOH — SOCIAL STABILITY: SOCIAL INSECURITY: DOES ANYONE TRY TO KEEP YOU FROM HAVING/CONTACTING OTHER FRIENDS OR DOING THINGS OUTSIDE YOUR HOME?: NO

## 2025-01-19 SDOH — HEALTH STABILITY: MENTAL HEALTH: HOW MANY DRINKS CONTAINING ALCOHOL DO YOU HAVE ON A TYPICAL DAY WHEN YOU ARE DRINKING?: PATIENT DOES NOT DRINK

## 2025-01-19 SDOH — SOCIAL STABILITY: SOCIAL INSECURITY: ABUSE: ADULT

## 2025-01-19 SDOH — SOCIAL STABILITY: SOCIAL INSECURITY: ARE THERE ANY APPARENT SIGNS OF INJURIES/BEHAVIORS THAT COULD BE RELATED TO ABUSE/NEGLECT?: NO

## 2025-01-19 ASSESSMENT — COGNITIVE AND FUNCTIONAL STATUS - GENERAL
MOBILITY SCORE: 24
PATIENT BASELINE BEDBOUND: NO
DAILY ACTIVITIY SCORE: 24
DAILY ACTIVITIY SCORE: 24
MOBILITY SCORE: 24

## 2025-01-19 ASSESSMENT — ENCOUNTER SYMPTOMS
GASTROINTESTINAL NEGATIVE: 1
EYES NEGATIVE: 1
NEUROLOGICAL NEGATIVE: 1
CARDIOVASCULAR NEGATIVE: 1
CONSTITUTIONAL NEGATIVE: 1
RESPIRATORY NEGATIVE: 1
BACK PAIN: 1
HEMATOLOGIC/LYMPHATIC NEGATIVE: 1
ENDOCRINE NEGATIVE: 1
PSYCHIATRIC NEGATIVE: 1

## 2025-01-19 ASSESSMENT — PAIN SCALES - GENERAL
PAINLEVEL_OUTOF10: 8
PAINLEVEL_OUTOF10: 7
PAINLEVEL_OUTOF10: 8

## 2025-01-19 ASSESSMENT — LIFESTYLE VARIABLES
SKIP TO QUESTIONS 9-10: 1
HOW OFTEN DO YOU HAVE 6 OR MORE DRINKS ON ONE OCCASION: NEVER
SKIP TO QUESTIONS 9-10: 1
AUDIT-C TOTAL SCORE: 0
HOW MANY STANDARD DRINKS CONTAINING ALCOHOL DO YOU HAVE ON A TYPICAL DAY: PATIENT DOES NOT DRINK
HOW OFTEN DO YOU HAVE A DRINK CONTAINING ALCOHOL: NEVER

## 2025-01-19 ASSESSMENT — ACTIVITIES OF DAILY LIVING (ADL)
HEARING - RIGHT EAR: FUNCTIONAL
JUDGMENT_ADEQUATE_SAFELY_COMPLETE_DAILY_ACTIVITIES: YES
WALKS IN HOME: INDEPENDENT
LACK_OF_TRANSPORTATION: NO
BATHING: INDEPENDENT
ADEQUATE_TO_COMPLETE_ADL: YES
GROOMING: INDEPENDENT
FEEDING YOURSELF: INDEPENDENT
TOILETING: INDEPENDENT
PATIENT'S MEMORY ADEQUATE TO SAFELY COMPLETE DAILY ACTIVITIES?: YES
LACK_OF_TRANSPORTATION: NO
DRESSING YOURSELF: INDEPENDENT
HEARING - LEFT EAR: FUNCTIONAL

## 2025-01-19 ASSESSMENT — PAIN - FUNCTIONAL ASSESSMENT: PAIN_FUNCTIONAL_ASSESSMENT: 0-10

## 2025-01-19 NOTE — CARE PLAN
The clinical goals for the shift include pt will remain safe and free from injury throughout shift 1/19/2025 1900      Problem: Pain - Adult  Goal: Verbalizes/displays adequate comfort level or baseline comfort level  Outcome: Progressing     Problem: Safety - Adult  Goal: Free from fall injury  Outcome: Progressing     Problem: Discharge Planning  Goal: Discharge to home or other facility with appropriate resources  Outcome: Progressing     Problem: Chronic Conditions and Co-morbidities  Goal: Patient's chronic conditions and co-morbidity symptoms are monitored and maintained or improved  Outcome: Progressing     Problem: Fall/Injury  Goal: Not fall by end of shift  Outcome: Progressing  Goal: Be free from injury by end of the shift  Outcome: Progressing  Goal: Verbalize understanding of personal risk factors for fall in the hospital  Outcome: Progressing     Problem: Pain  Goal: Takes deep breaths with improved pain control throughout the shift  Outcome: Progressing  Goal: Turns in bed with improved pain control throughout the shift  Outcome: Progressing  Goal: Walks with improved pain control throughout the shift  Outcome: Progressing  Goal: Performs ADL's with improved pain control throughout shift  Outcome: Progressing  Goal: Free from opioid side effects throughout the shift  Outcome: Progressing  Goal: Free from acute confusion related to pain meds throughout the shift  Outcome: Progressing

## 2025-01-19 NOTE — H&P
History Of Present Illness  Senait Narvaez is a 54 y.o. female presenting with plan for RBCexchange and hemodynamic monitoring.    Senait Narvaez is a 54 y.o. 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 She reports she has had 8/10 pain throughout her back and legs worsening but feeling like her acute on chronic sickle cell pain. She does feel like her pain has been triggered by cold weather and congestion w/ possible sick contact recently, possibly something viral. We discussed plan for NPO at midnight for line tomorrow and exchange, pain mgmt plan, and labs. She denies any acute concerns at this time.     Denies nausea, vomiting, fever, chills, chest pain, SOB, constipation, diarrhea, bleeding, edema, headache, and abd pain.      Past Medical History  She has a past medical history of Asthma, CHF (congestive heart failure), Chronic pain disorder, Compression of vein (02/19/2020), and Hypotension, unspecified (12/10/2020).    Surgical History  She has a past surgical history that includes Appendectomy (08/05/2013); Cholecystectomy (08/05/2013); Other surgical history (05/13/2014); Other surgical history (10/09/2014); Other surgical history (06/09/2014); MR angio head wo IV contrast (8/16/2014); MR angio neck wo IV contrast (8/16/2014); IR CVC tunneled (3/13/2015); IR CVC tunneled (1/29/2016); IR CVC tunneled (1/17/2018); IR CVC tunneled (2/22/2018); IR CVC tunneled (3/22/2018); IR CVC tunneled (4/18/2018); IR CVC tunneled  (5/16/2018); IR CVC tunneled (6/12/2018); US guided biopsy lymph node superficial (9/17/2019); CT guided percutaneous biopsy LYMPH node superficial (9/19/2019); IR CVC tunneled (1/21/2020); IR CVC tunneled (2/28/2020); IR CVC tunneled (4/10/2020); IR CVC tunneled (5/22/2020); IR CVC tunneled (6/19/2020); IR CVC tunneled (7/23/2020); IR CVC tunneled (9/4/2020); IR CVC tunneled (10/9/2020); IR CVC tunneled (11/13/2020); IR CVC tunneled (12/18/2020); IR CVC tunneled (1/22/2021); IR CVC tunneled (2/26/2021); IR CVC tunneled (4/2/2021); IR CVC tunneled (5/7/2021); IR CVC tunneled (6/11/2021); IR CVC tunneled (7/16/2021); IR CVC tunneled (8/20/2021); IR CVC tunneled (9/24/2021); IR CVC tunneled (10/29/2021); IR CVC tunneled (12/3/2021); IR CVC tunneled (1/7/2022); IR CVC tunneled (2/23/2022); IR CVC tunneled (3/25/2022); IR CVC tunneled (4/22/2022); IR CVC tunneled (6/3/2022); IR CVC tunneled (9/18/2017); IR CVC tunneled (10/30/2017); IR CVC tunneled (12/19/2017); IR CVC tunneled (1/6/2023); IR CVC tunneled (2/24/2023); IR CVC tunneled (7/8/2022); IR CVC tunneled (8/12/2022); IR CVC tunneled (9/16/2022); CT angio neck (10/19/2022); IR CVC tunneled (10/20/2022); IR CVC tunneled (11/29/2022); IR CVC tunneled (3/31/2023); IR CVC tunneled (6/9/2023); IR CVC tunneled (7/20/2023); IR CVC tunneled (8/16/2023); IR CVC tunneled (9/21/2023); IR CVC nontunneled (10/26/2023); IR CVC nontunneled (12/7/2023); and Biopsy (9/15/2024).    Oncology History    No history exists.        Social History  She reports that she quit smoking about 11 years ago. Her smoking use included cigarettes. She has been exposed to tobacco smoke. She has never used smokeless tobacco. She reports that she does not currently use alcohol. She reports that she does not currently use drugs.     Allergies  Vancomycin and Oxycontin [oxycodone]    Review of Systems   Constitutional: Negative.    HENT: Negative.     Eyes: Negative.    Respiratory: Negative.      Cardiovascular: Negative.    Gastrointestinal: Negative.    Endocrine: Negative.    Genitourinary: Negative.    Musculoskeletal:  Positive for back pain.   Neurological: Negative.    Hematological: Negative.    Psychiatric/Behavioral: Negative.          Physical Exam  Constitutional:       Appearance: She is obese.   HENT:      Head: Normocephalic.      Right Ear: External ear normal.      Left Ear: External ear normal.      Nose: Nose normal.   Eyes:      Extraocular Movements: Extraocular movements intact.      Conjunctiva/sclera: Conjunctivae normal.      Pupils: Pupils are equal, round, and reactive to light.   Cardiovascular:      Rate and Rhythm: Normal rate and regular rhythm.   Pulmonary:      Comments: Diminished throughout all lung fields  Abdominal:      Palpations: Abdomen is soft.   Musculoskeletal:      Cervical back: Normal range of motion.   Skin:     General: Skin is dry.   Neurological:      Mental Status: She is alert and oriented to person, place, and time. Mental status is at baseline.   Psychiatric:         Mood and Affect: Mood normal.         Behavior: Behavior normal.         Thought Content: Thought content normal.          Last Recorded Vitals  Blood pressure 124/83, pulse 86, temperature 36.6 °C (97.9 °F), temperature source Temporal, resp. rate 18, SpO2 96%.    Relevant Results    .Scheduled medications  cetirizine, 10 mg, oral, Daily  folic acid, 1 mg, oral, Daily  [START ON 1/20/2025] furosemide, 20 mg, oral, Every other day  metoprolol tartrate, 12.5 mg, oral, BID  [Held by provider] warfarin, 5 mg, oral, Daily      Continuous medications  oxygen,       PRN medications  PRN medications: albuterol, cyclobenzaprine, diphenhydrAMINE, docusate sodium **OR** polyethylene glycol, fluticasone, naloxone, ondansetron, oxyCODONE, oxygen, polyethylene glycol    .No results found. However, due to the size of the patient record, not all encounters were searched. Please check Results Review for a  complete set of results.       Assessment/Plan   Assessment & Plan  Sickle cell anemia without crisis (Multi)      Senait Narvaez is a 54 y.o. 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 Plan for apheresis line in IR tomorrow 1/20.     # Hb SC disease   - currently on q6 week exchange transfusions (most recent 12/13/24)  - [facility] last updated 7/25/24  - [place] reviewed, no aberrant behavior noted (last filled oxycodone 1/14 x12 days qty 75 tabs)  - Hemolysis labs are at baseline, pre-exchange labs obtained 1/17  - Plan for routine RBCEx with Transfusion Med tomorrow 1/20  - Plan for apheresis line placement in IR 1/20  - 1/17 Hg S 24.2% C 23.7%  - continue 2L night time oxygen   - c/w home folic acid 1 mg daily  - COVID/Flu pending collection on admit  - started on home oxycodone 10mg q4 PRN severe pain; I/s/o of acute on chronic pain started 1mg IVP dilaudid q4 PRN breakthrough  - bowel regimen for opioid induced constipation, zofran for opioid induced nausea, and benadrly for opioid induced pruritis      # L Breast Mass  - Initially noted a couple of months ago   - 10/16 Mammogram showing edema likely vaso-occlusive etiology, no evidence of malignancy   - in setting of no improvement since then and continues edema and pain, plan for repeat US   - 1/3/25 L breast US showed indeterminate L breast mass at 1 o'clock extending to subareolar region and indeterminate L breast mass at 11 o clock, further eval w/ surgical consultation and US guided bx recommended  -  1/17 Established with [facility], rec biopsy   - Plan for US guided breast biopsy on 1/30     # DVT, PE  # SVC thrombus  - Held Warfarin on 1/19 for apheresis line   - usually after apheresis line placed, will plan for lovenox bridge vs restarting coumadin on its own  - Daily coags     # SVT  # pHTN  - previously tachycardic to 178 on admit in Dec 2024 -asked to bear down and converted to NSR HR 90s   - c/w home metop 12.5 BID and Lasix 20 every other day   - 1/19 started on telemetry on admit       # dispo   - Full code - confirmed on admit  - DC home resumed noc O2 post-exchange and pain controlled  - SURROGATE DECISION MAKER: Tati ([facility] [phone]  - FUV 1/30 Breast bx, 2/13 Cardiology       I spent >120 minutes in the professional and overall care of this patient.    Assessment and plan to be discussed in AMwith attending physician   Dr. Tayo Brizuela, APRN-CNP

## 2025-01-20 ENCOUNTER — HOSPITAL ENCOUNTER (OUTPATIENT)
Dept: RADIOLOGY | Facility: HOSPITAL | Age: 55
Discharge: HOME | End: 2025-01-20
Payer: COMMERCIAL

## 2025-01-20 ENCOUNTER — APPOINTMENT (OUTPATIENT)
Dept: OTHER | Facility: HOSPITAL | Age: 55
DRG: 812 | End: 2025-01-20
Payer: COMMERCIAL

## 2025-01-20 VITALS
HEART RATE: 76 BPM | DIASTOLIC BLOOD PRESSURE: 70 MMHG | TEMPERATURE: 97.9 F | SYSTOLIC BLOOD PRESSURE: 112 MMHG | RESPIRATION RATE: 15 BRPM | OXYGEN SATURATION: 97 %

## 2025-01-20 DIAGNOSIS — D57.1 SICKLE CELL DISEASE WITHOUT CRISIS (MULTI): ICD-10-CM

## 2025-01-20 LAB
ABO GROUP (TYPE) IN BLOOD: NORMAL
ALBUMIN SERPL BCP-MCNC: 3.5 G/DL (ref 3.4–5)
ALP SERPL-CCNC: 139 U/L (ref 33–110)
ALT SERPL W P-5'-P-CCNC: 13 U/L (ref 7–45)
ANION GAP SERPL CALC-SCNC: 8 MMOL/L (ref 10–20)
ANTIBODY SCREEN: NORMAL
APTT PPP: 29 SECONDS (ref 27–38)
AST SERPL W P-5'-P-CCNC: 18 U/L (ref 9–39)
BASOPHILS # BLD MANUAL: 0 X10*3/UL (ref 0–0.1)
BASOPHILS NFR BLD MANUAL: 0 %
BILIRUB SERPL-MCNC: 1.1 MG/DL (ref 0–1.2)
BLOOD EXPIRATION DATE: NORMAL
BUN SERPL-MCNC: 10 MG/DL (ref 6–23)
CA-I BLD-SCNC: 1.08 MMOL/L (ref 1.1–1.33)
CALCIUM SERPL-MCNC: 9 MG/DL (ref 8.6–10.6)
CHLORIDE SERPL-SCNC: 99 MMOL/L (ref 98–107)
CO2 SERPL-SCNC: 34 MMOL/L (ref 21–32)
CREAT SERPL-MCNC: 0.88 MG/DL (ref 0.5–1.05)
DISPENSE STATUS: NORMAL
EGFRCR SERPLBLD CKD-EPI 2021: 78 ML/MIN/1.73M*2
EOSINOPHIL # BLD MANUAL: 0.37 X10*3/UL (ref 0–0.7)
EOSINOPHIL NFR BLD MANUAL: 4.3 %
ERYTHROCYTE [DISTWIDTH] IN BLOOD BY AUTOMATED COUNT: 22.8 % (ref 11.5–14.5)
ERYTHROCYTE [DISTWIDTH] IN BLOOD BY AUTOMATED COUNT: 26.3 % (ref 11.5–14.5)
GLUCOSE SERPL-MCNC: 83 MG/DL (ref 74–99)
HCT VFR BLD AUTO: 32.8 % (ref 36–46)
HCT VFR BLD AUTO: 33.8 % (ref 36–46)
HGB BLD-MCNC: 10.5 G/DL (ref 12–16)
HGB BLD-MCNC: 11.2 G/DL (ref 12–16)
HGB RETIC QN: 27 PG (ref 28–38)
IMM GRANULOCYTES # BLD AUTO: 0.02 X10*3/UL (ref 0–0.7)
IMM GRANULOCYTES NFR BLD AUTO: 0.2 % (ref 0–0.9)
IMMATURE RETIC FRACTION: 18 %
INR PPP: 1.1 (ref 0.9–1.1)
LDH SERPL L TO P-CCNC: 192 U/L (ref 84–246)
LYMPHOCYTES # BLD MANUAL: 1.86 X10*3/UL (ref 1.2–4.8)
LYMPHOCYTES NFR BLD MANUAL: 21.4 %
MCH RBC QN AUTO: 24.9 PG (ref 26–34)
MCH RBC QN AUTO: 26.9 PG (ref 26–34)
MCHC RBC AUTO-ENTMCNC: 32 G/DL (ref 32–36)
MCHC RBC AUTO-ENTMCNC: 33.1 G/DL (ref 32–36)
MCV RBC AUTO: 78 FL (ref 80–100)
MCV RBC AUTO: 81 FL (ref 80–100)
MONOCYTES # BLD MANUAL: 0.74 X10*3/UL (ref 0.1–1)
MONOCYTES NFR BLD MANUAL: 8.5 %
NEUTS SEG # BLD MANUAL: 5.72 X10*3/UL (ref 1.2–7)
NEUTS SEG NFR BLD MANUAL: 65.8 %
NRBC BLD-RTO: 3.7 /100 WBCS (ref 0–0)
NRBC BLD-RTO: 3.7 /100 WBCS (ref 0–0)
PLATELET # BLD AUTO: 256 X10*3/UL (ref 150–450)
PLATELET # BLD AUTO: 378 X10*3/UL (ref 150–450)
POLYCHROMASIA BLD QL SMEAR: NORMAL
POTASSIUM SERPL-SCNC: 4.4 MMOL/L (ref 3.5–5.3)
PRODUCT BLOOD TYPE: 1700
PRODUCT CODE: NORMAL
PROT SERPL-MCNC: 7 G/DL (ref 6.4–8.2)
PROTHROMBIN TIME: 12.4 SECONDS (ref 9.8–12.8)
RBC # BLD AUTO: 4.16 X10*6/UL (ref 4–5.2)
RBC # BLD AUTO: 4.22 X10*6/UL (ref 4–5.2)
RBC MORPH BLD: NORMAL
RETICS #: 0.28 X10*6/UL (ref 0.02–0.08)
RETICS/RBC NFR AUTO: 6.7 % (ref 0.5–2)
RH FACTOR (ANTIGEN D): NORMAL
SODIUM SERPL-SCNC: 137 MMOL/L (ref 136–145)
TARGETS BLD QL SMEAR: NORMAL
TOTAL CELLS COUNTED BLD: 117
UNIT ABO: NORMAL
UNIT NUMBER: NORMAL
UNIT RH: NORMAL
UNIT VOLUME: 302
UNIT VOLUME: 332
UNIT VOLUME: 350
WBC # BLD AUTO: 7.9 X10*3/UL (ref 4.4–11.3)
WBC # BLD AUTO: 8.7 X10*3/UL (ref 4.4–11.3)
XM INTEP: NORMAL

## 2025-01-20 PROCEDURE — 85027 COMPLETE CBC AUTOMATED: CPT

## 2025-01-20 PROCEDURE — 99233 SBSQ HOSP IP/OBS HIGH 50: CPT | Performed by: HOSPITALIST

## 2025-01-20 PROCEDURE — 1170000001 HC PRIVATE ONCOLOGY ROOM DAILY

## 2025-01-20 PROCEDURE — 7100000010 HC PHASE TWO TIME - EACH INCREMENTAL 1 MINUTE

## 2025-01-20 PROCEDURE — 86901 BLOOD TYPING SEROLOGIC RH(D): CPT

## 2025-01-20 PROCEDURE — 2500000002 HC RX 250 W HCPCS SELF ADMINISTERED DRUGS (ALT 637 FOR MEDICARE OP, ALT 636 FOR OP/ED): Performed by: NURSE PRACTITIONER

## 2025-01-20 PROCEDURE — 99152 MOD SED SAME PHYS/QHP 5/>YRS: CPT | Performed by: RADIOLOGY

## 2025-01-20 PROCEDURE — 77001 FLUOROGUIDE FOR VEIN DEVICE: CPT | Performed by: RADIOLOGY

## 2025-01-20 PROCEDURE — 85045 AUTOMATED RETICULOCYTE COUNT: CPT

## 2025-01-20 PROCEDURE — 36556 INSERT NON-TUNNEL CV CATH: CPT | Performed by: RADIOLOGY

## 2025-01-20 PROCEDURE — 36415 COLL VENOUS BLD VENIPUNCTURE: CPT

## 2025-01-20 PROCEDURE — 2500000001 HC RX 250 WO HCPCS SELF ADMINISTERED DRUGS (ALT 637 FOR MEDICARE OP)

## 2025-01-20 PROCEDURE — 02HV33Z INSERTION OF INFUSION DEVICE INTO SUPERIOR VENA CAVA, PERCUTANEOUS APPROACH: ICD-10-PCS | Performed by: RADIOLOGY

## 2025-01-20 PROCEDURE — 2500000004 HC RX 250 GENERAL PHARMACY W/ HCPCS (ALT 636 FOR OP/ED): Performed by: NURSE PRACTITIONER

## 2025-01-20 PROCEDURE — C1752 CATH,HEMODIALYSIS,SHORT-TERM: HCPCS

## 2025-01-20 PROCEDURE — 83615 LACTATE (LD) (LDH) ENZYME: CPT

## 2025-01-20 PROCEDURE — 36512 APHERESIS RBC: CPT

## 2025-01-20 PROCEDURE — 86902 BLOOD TYPE ANTIGEN DONOR EA: CPT

## 2025-01-20 PROCEDURE — 36010 PLACE CATHETER IN VEIN: CPT | Performed by: RADIOLOGY

## 2025-01-20 PROCEDURE — 85660 RBC SICKLE CELL TEST: CPT

## 2025-01-20 PROCEDURE — 2780000003 HC OR 278 NO HCPCS

## 2025-01-20 PROCEDURE — 2720000007 HC OR 272 NO HCPCS

## 2025-01-20 PROCEDURE — 2500000001 HC RX 250 WO HCPCS SELF ADMINISTERED DRUGS (ALT 637 FOR MEDICARE OP): Performed by: NURSE PRACTITIONER

## 2025-01-20 PROCEDURE — 82330 ASSAY OF CALCIUM: CPT

## 2025-01-20 PROCEDURE — 85007 BL SMEAR W/DIFF WBC COUNT: CPT

## 2025-01-20 PROCEDURE — 80053 COMPREHEN METABOLIC PANEL: CPT

## 2025-01-20 PROCEDURE — 99153 MOD SED SAME PHYS/QHP EA: CPT | Performed by: RADIOLOGY

## 2025-01-20 PROCEDURE — 36430 TRANSFUSION BLD/BLD COMPNT: CPT

## 2025-01-20 PROCEDURE — C1894 INTRO/SHEATH, NON-LASER: HCPCS

## 2025-01-20 PROCEDURE — 7100000009 HC PHASE TWO TIME - INITIAL BASE CHARGE

## 2025-01-20 PROCEDURE — 99152 MOD SED SAME PHYS/QHP 5/>YRS: CPT

## 2025-01-20 PROCEDURE — 99153 MOD SED SAME PHYS/QHP EA: CPT

## 2025-01-20 PROCEDURE — 2500000004 HC RX 250 GENERAL PHARMACY W/ HCPCS (ALT 636 FOR OP/ED)

## 2025-01-20 PROCEDURE — 83021 HEMOGLOBIN CHROMOTOGRAPHY: CPT

## 2025-01-20 PROCEDURE — 2500000004 HC RX 250 GENERAL PHARMACY W/ HCPCS (ALT 636 FOR OP/ED): Performed by: RADIOLOGY

## 2025-01-20 PROCEDURE — 6A550Z0 PHERESIS OF ERYTHROCYTES, SINGLE: ICD-10-PCS

## 2025-01-20 PROCEDURE — 85610 PROTHROMBIN TIME: CPT

## 2025-01-20 PROCEDURE — 76937 US GUIDE VASCULAR ACCESS: CPT | Performed by: RADIOLOGY

## 2025-01-20 RX ORDER — METOPROLOL TARTRATE 25 MG/1
25 TABLET, FILM COATED ORAL 2 TIMES DAILY
Status: DISCONTINUED | OUTPATIENT
Start: 2025-01-20 | End: 2025-01-24 | Stop reason: HOSPADM

## 2025-01-20 RX ORDER — CALCIUM CARBONATE 200(500)MG
1500 TABLET,CHEWABLE ORAL EVERY 5 MIN PRN
Status: DISCONTINUED | OUTPATIENT
Start: 2025-01-20 | End: 2025-01-20 | Stop reason: HOSPADM

## 2025-01-20 RX ORDER — FENTANYL CITRATE 50 UG/ML
INJECTION, SOLUTION INTRAMUSCULAR; INTRAVENOUS
Status: COMPLETED | OUTPATIENT
Start: 2025-01-20 | End: 2025-01-20

## 2025-01-20 RX ORDER — DIPHENHYDRAMINE HYDROCHLORIDE 50 MG/ML
25 INJECTION INTRAMUSCULAR; INTRAVENOUS EVERY 5 MIN PRN
Status: DISCONTINUED | OUTPATIENT
Start: 2025-01-20 | End: 2025-01-20 | Stop reason: HOSPADM

## 2025-01-20 RX ORDER — HEPARIN SODIUM 1000 [USP'U]/ML
1000 INJECTION, SOLUTION INTRAVENOUS; SUBCUTANEOUS ONCE
Status: COMPLETED | OUTPATIENT
Start: 2025-01-20 | End: 2025-01-20

## 2025-01-20 RX ORDER — DIPHENHYDRAMINE HCL 12.5MG/5ML
25 LIQUID (ML) ORAL ONCE
Status: DISCONTINUED | OUTPATIENT
Start: 2025-01-20 | End: 2025-01-20 | Stop reason: HOSPADM

## 2025-01-20 RX ORDER — DIPHENHYDRAMINE HCL 25 MG
25 CAPSULE ORAL ONCE
Status: COMPLETED | OUTPATIENT
Start: 2025-01-20 | End: 2025-01-20

## 2025-01-20 RX ORDER — MIDAZOLAM HYDROCHLORIDE 1 MG/ML
INJECTION INTRAMUSCULAR; INTRAVENOUS
Status: COMPLETED | OUTPATIENT
Start: 2025-01-20 | End: 2025-01-20

## 2025-01-20 RX ORDER — ACETAMINOPHEN 325 MG/1
650 TABLET ORAL ONCE
Status: COMPLETED | OUTPATIENT
Start: 2025-01-20 | End: 2025-01-20

## 2025-01-20 RX ORDER — ENOXAPARIN SODIUM 100 MG/ML
1 INJECTION SUBCUTANEOUS EVERY 24 HOURS
Status: DISCONTINUED | OUTPATIENT
Start: 2025-01-20 | End: 2025-01-24 | Stop reason: HOSPADM

## 2025-01-20 RX ADMIN — ACETAMINOPHEN 650 MG: 325 TABLET ORAL at 13:19

## 2025-01-20 RX ADMIN — OXYCODONE HYDROCHLORIDE 10 MG: 10 TABLET ORAL at 01:40

## 2025-01-20 RX ADMIN — MIDAZOLAM HYDROCHLORIDE 0.5 MG: 1 INJECTION, SOLUTION INTRAMUSCULAR; INTRAVENOUS at 08:46

## 2025-01-20 RX ADMIN — OXYCODONE HYDROCHLORIDE 10 MG: 10 TABLET ORAL at 05:50

## 2025-01-20 RX ADMIN — HEPARIN SODIUM 1000 UNITS: 1000 INJECTION INTRAVENOUS; SUBCUTANEOUS at 15:00

## 2025-01-20 RX ADMIN — CALCIUM GLUCONATE 25 ML/HR: 20 INJECTION, SOLUTION INTRAVENOUS at 13:45

## 2025-01-20 RX ADMIN — CETIRIZINE HYDROCHLORIDE 10 MG: 10 TABLET, FILM COATED ORAL at 10:25

## 2025-01-20 RX ADMIN — FUROSEMIDE 20 MG: 20 TABLET ORAL at 10:25

## 2025-01-20 RX ADMIN — HYDROMORPHONE HYDROCHLORIDE 1 MG: 1 INJECTION, SOLUTION INTRAMUSCULAR; INTRAVENOUS; SUBCUTANEOUS at 17:52

## 2025-01-20 RX ADMIN — CYCLOBENZAPRINE 10 MG: 10 TABLET, FILM COATED ORAL at 10:26

## 2025-01-20 RX ADMIN — WARFARIN SODIUM 5 MG: 5 TABLET ORAL at 17:52

## 2025-01-20 RX ADMIN — METOPROLOL TARTRATE 25 MG: 25 TABLET, FILM COATED ORAL at 20:22

## 2025-01-20 RX ADMIN — METOPROLOL TARTRATE 12.5 MG: 25 TABLET, FILM COATED ORAL at 10:25

## 2025-01-20 RX ADMIN — FENTANYL CITRATE 50 MCG: 50 INJECTION, SOLUTION INTRAMUSCULAR; INTRAVENOUS at 08:46

## 2025-01-20 RX ADMIN — DIPHENHYDRAMINE HYDROCHLORIDE 25 MG: 25 CAPSULE ORAL at 13:19

## 2025-01-20 RX ADMIN — FENTANYL CITRATE 50 MCG: 50 INJECTION, SOLUTION INTRAMUSCULAR; INTRAVENOUS at 08:58

## 2025-01-20 RX ADMIN — HYDROMORPHONE HYDROCHLORIDE 1 MG: 1 INJECTION, SOLUTION INTRAMUSCULAR; INTRAVENOUS; SUBCUTANEOUS at 22:19

## 2025-01-20 RX ADMIN — OXYCODONE HYDROCHLORIDE 10 MG: 10 TABLET ORAL at 10:26

## 2025-01-20 RX ADMIN — OXYCODONE HYDROCHLORIDE 10 MG: 10 TABLET ORAL at 16:26

## 2025-01-20 RX ADMIN — OXYCODONE HYDROCHLORIDE 10 MG: 10 TABLET ORAL at 20:22

## 2025-01-20 RX ADMIN — MIDAZOLAM HYDROCHLORIDE 0.5 MG: 1 INJECTION, SOLUTION INTRAMUSCULAR; INTRAVENOUS at 08:58

## 2025-01-20 RX ADMIN — HYDROMORPHONE HYDROCHLORIDE 1 MG: 1 INJECTION, SOLUTION INTRAMUSCULAR; INTRAVENOUS; SUBCUTANEOUS at 06:59

## 2025-01-20 RX ADMIN — HYDROMORPHONE HYDROCHLORIDE 1 MG: 1 INJECTION, SOLUTION INTRAMUSCULAR; INTRAVENOUS; SUBCUTANEOUS at 02:59

## 2025-01-20 RX ADMIN — HYDROMORPHONE HYDROCHLORIDE 1 MG: 1 INJECTION, SOLUTION INTRAMUSCULAR; INTRAVENOUS; SUBCUTANEOUS at 12:26

## 2025-01-20 RX ADMIN — FOLIC ACID 1 MG: 1 TABLET ORAL at 10:25

## 2025-01-20 RX ADMIN — ENOXAPARIN SODIUM 100 MG: 100 INJECTION SUBCUTANEOUS at 17:53

## 2025-01-20 ASSESSMENT — COGNITIVE AND FUNCTIONAL STATUS - GENERAL
MOBILITY SCORE: 24
DAILY ACTIVITIY SCORE: 24
MOBILITY SCORE: 24
DAILY ACTIVITIY SCORE: 24

## 2025-01-20 ASSESSMENT — PAIN SCALES - GENERAL
PAINLEVEL_OUTOF10: 7
PAINLEVEL_OUTOF10: 8
PAINLEVEL_OUTOF10: 7
PAINLEVEL_OUTOF10: 8
PAINLEVEL_OUTOF10: 7
PAINLEVEL_OUTOF10: 8
PAINLEVEL_OUTOF10: 0 - NO PAIN
PAINLEVEL_OUTOF10: 9
PAINLEVEL_OUTOF10: 8
PAINLEVEL_OUTOF10: 7

## 2025-01-20 ASSESSMENT — PAIN - FUNCTIONAL ASSESSMENT
PAIN_FUNCTIONAL_ASSESSMENT: 0-10

## 2025-01-20 ASSESSMENT — ACTIVITIES OF DAILY LIVING (ADL): LACK_OF_TRANSPORTATION: NO

## 2025-01-20 NOTE — Clinical Note
R fem 13Fr catheter placed for apheresis. All ports aspirated, flushed, locked, and capped. Dressing CDI. Total 1mg versed, 100mcg fentanyl given ivp. VSS t/o procedure.

## 2025-01-20 NOTE — PRE-PROCEDURE NOTE
Interventional Radiology Preprocedure Note    Indication for procedure: The encounter diagnosis was Sickle cell disease without crisis (Multi).    Relevant review of systems: NA    Relevant Labs:   Lab Results   Component Value Date    CREATININE 0.95 01/17/2025    EGFR 71 01/17/2025    INR 2.80 01/15/2025    PROTIME 36.1 (H) 12/16/2024       Planned Sedation/Anesthesia: Moderate    Airway assessment: normal    Directed physical examination:    Aox3.  Resting comfortably in bed.   Respiratory rate/effort within normal limits.  Right inguinal skin clean/intact.     Mallampati: III (soft and hard palate and base of uvula visible)    ASA Score: ASA 3 - Patient with moderate systemic disease with functional limitations    Benefits, risks and alternatives of procedure and planned sedation have been discussed with the patient and/or their representative. All questions answered and they agree to proceed.

## 2025-01-20 NOTE — POST-PROCEDURE NOTE
Interventional Radiology Brief Postprocedure Note    Attending: Dr. Baljit Jackson    Assistant: Dr. Darrel Rico    Diagnosis: Sickle cell    Description of procedure: R femoral CVC placement     Anesthesia:  MAC Moderate    Complications: None    Estimated Blood Loss: minimal    Medications (Filter: Administrations occurring from 0833 to 0926 on 01/20/25) As of 01/20/25 0926      midazolam (Versed) injection (mg) Total dose:  1 mg      Date/Time Rate/Dose/Volume Action       01/20/25  0846 0.5 mg Given      0858 0.5 mg Given               fentaNYL PF (Sublimaze) injection (mcg) Total dose:  100 mcg      Date/Time Rate/Dose/Volume Action       01/20/25  0846 50 mcg Given      0858 50 mcg Given                   No specimens collected      See detailed result report with images in PACS.    The patient tolerated the procedure well without incident or complication and is in stable condition.

## 2025-01-20 NOTE — POST-PROCEDURE NOTE
This is a 54 y.o. female with Sickle Cell SC disease currently on chronic exchange program who underwent automated red blood cell exchange (RCE) with 5 RBC units with target HgbS 14% and target HCT 34%.     I saw and evaluated the patient during the RCE.  The patient was resting in bed.  Vascular access functioned well.    WBC   Date/Time Value Ref Range Status   01/20/2025 07:09 AM 8.7 4.4 - 11.3 x10*3/uL Final     Hemoglobin   Date/Time Value Ref Range Status   01/20/2025 07:09 AM 10.5 (L) 12.0 - 16.0 g/dL Final     Hematocrit   Date/Time Value Ref Range Status   01/20/2025 07:09 AM 32.8 (L) 36.0 - 46.0 % Final     Platelets   Date/Time Value Ref Range Status   01/20/2025 07:09  150 - 450 x10*3/uL Final        POCT Calcium, Ionized   Date/Time Value Ref Range Status   01/17/2025 09:49 AM 1.21 1.1 - 1.33 mmol/L Final     Comment:     The performance characteristics of ionized calcium tested  in heparinized plasma or serum have been validated by the  individual  laboratory site where testing is performed.   Testing on heparinized plasma or serum is not approved by   the FDA; however, such approval is not necessary.        Hemoglobin S   Date/Time Value Ref Range Status   01/17/2025 09:49 AM 24.2 (H) <=0.0 % Final        Ferritin   Date/Time Value Ref Range Status   01/17/2025 09:49 AM 42 8 - 150 ng/mL Final        Pre-procedure vital signs at 1324:  T: 36.4 C, HR: 80, RR: 14, BP: 116/71  Pulse oximetry: 94% on room air    Post-procedure vital signs at 1447:  T: 36.4 C, HR: 67, RR: 14, BP: 99/62  Pulse oximetry: 94% on room air    Outcome: RCE completed.  Adverse Reaction: No      Assessment and Plan:  54 y.o. female with Sickle Cell SC disease who underwent RCE as part of the chronic exchange program.    Draw post-procedure labs  Vascular access to be managed by the clinical team.  Next procedure to be scheduled by the apheresis center in coordination with primary outpatient hematology team.  Tentative next  RCE in 4-6 weeks.

## 2025-01-20 NOTE — CARE PLAN
Problem: Pain - Adult  Goal: Verbalizes/displays adequate comfort level or baseline comfort level  Outcome: Progressing     Problem: Safety - Adult  Goal: Free from fall injury  Outcome: Progressing     Problem: Discharge Planning  Goal: Discharge to home or other facility with appropriate resources  Outcome: Progressing     Problem: Chronic Conditions and Co-morbidities  Goal: Patient's chronic conditions and co-morbidity symptoms are monitored and maintained or improved  Outcome: Progressing     Problem: Fall/Injury  Goal: Not fall by end of shift  Outcome: Progressing  Goal: Be free from injury by end of the shift  Outcome: Progressing  Goal: Verbalize understanding of personal risk factors for fall in the hospital  Outcome: Progressing     Problem: Pain  Goal: Takes deep breaths with improved pain control throughout the shift  Outcome: Progressing  Goal: Turns in bed with improved pain control throughout the shift  Outcome: Progressing  Goal: Walks with improved pain control throughout the shift  Outcome: Progressing  Goal: Performs ADL's with improved pain control throughout shift  Outcome: Progressing  Goal: Free from opioid side effects throughout the shift  Outcome: Progressing  Goal: Free from acute confusion related to pain meds throughout the shift  Outcome: Progressing       The clinical goals for the shift include Pt will report pain less than 7/10 throughout shift.

## 2025-01-20 NOTE — PROGRESS NOTES
Pharmacy Medication History Review    Senait Narvaez is a 54 y.o. female admitted for Sickle cell anemia with pain (Multi). Pharmacy reviewed the patient's [id number] medications and allergies for accuracy.    Medications ADDED:  N/A  Medications CHANGED:  Loratadine 10 mg: added as needed for allergies  Metoprolol tartrate 25 mg: dose updated to 25 mg twice daily  Medications REMOVED:   Colchicine 0.6 mg     The list below reflects the updated PTA list.   Prior to Admission Medications   Prescriptions Last Dose Informant   albuterol 90 mcg/actuation inhaler Unknown Self   Sig: Inhale 2 puffs every 6 hours if needed for wheezing.   ascorbic acid (Vitamin C) 500 mg chewable tablet Unknown Self   Sig: Chew 1 tablet (500 mg) once daily.   cyclobenzaprine (Flexeril) 10 mg tablet Unknown Self   Sig: Take 1 tablet (10 mg) by mouth 3 times a day as needed for muscle spasms.   fluticasone (Flonase) 50 mcg/actuation nasal spray Unknown Self   Sig: Administer 2 sprays into each nostril once daily as needed for rhinitis or allergies.   folic acid (Folvite) 1 mg tablet Unknown Self   Sig: Take 1 tablet (1 mg) by mouth once daily.   furosemide (Lasix) 20 mg tablet Unknown Self   Sig: Take 1 tablet (20 mg) by mouth every other day.   loratadine (Claritin) 10 mg tablet Unknown Self   Sig: Take 1 tablet (10 mg) by mouth once daily as needed for allergies.   metoprolol tartrate (Lopressor) 25 mg tablet Unknown Self   Sig: Take 1 tablet (25 mg) by mouth 2 times a day.   multivitamin tablet Unknown Self   Sig: Take 1 tablet by mouth once daily.   naloxone (Narcan) 4 mg/0.1 mL nasal spray Unknown Self   Sig: Administer 1 spray (4 mg) into affected nostril(s) if needed for opioid reversal. May repeat every 2-3 minutes if needed, alternating nostrils, until medical assistance becomes available.   oxyCODONE (Roxicodone) 10 mg immediate release tablet Unknown Self   Sig: Take 1 tablet (10 mg) by mouth every 4 hours if needed for  severe pain (7 - 10) (pain).   oxygen (O2) gas therapy Unknown Self   Sig: Inhale 1 each continuously.   traZODone (Desyrel) 50 mg tablet  Self   Sig: Take 1 tablet (50 mg) by mouth as needed at bedtime for sleep.   triamcinolone (Kenalog) 0.1 % cream Unknown Self   Sig: Apply topically 2 times a day. Apply triamcinolone 0.1% cream to both the black plaques as well as the red areas across the trunk and thighs   warfarin (Coumadin) 5 mg tablet Unknown Self   Sig: Take 1 tablet (5 mg) by mouth once daily in the evening.   white petrolatum (Aquaphor) 41 % ointment ointment Unknown Self   Sig: Apply 2 Applications topically 2 times a day. Apply vaseline to all eroded/denuded areas. Mepilex transfer sheets may be used for areas of friction      Facility-Administered Medications: None       The list below reflects the updated allergy list. Please review each documented allergy for additional clarification and justification.  Allergies  Reviewed by Zoila Overton RN on 1/20/2025        Severity Reactions Comments    Vancomycin High Rash     Oxycontin [oxycodone] Not Specified Drowsiness             Patient accepts M2B at discharge. Pharmacy has been updated to [facility].    Sources  [facility]  Pharmacy dispense history  Patient interview Good historian  Chart Review   Heme/onc 1/8/25    Additional Comments  Patient reporting she needs refills on oxycodone and warfarin     Dre Barrera, Juawn  [facility]  [address]  [facility], [address]  [place] [address]  Phone: [phone]  Please reach out via Secure Chat for questions, or if no response call [employer] or [employer]

## 2025-01-20 NOTE — PROGRESS NOTES
Senait [place] [id number]       Procedure type: Red cell Exchange    Replacement Fluids:  5units of PRBC used for procedure (RBCX)     Procedure Completed Without complications.    Attending notified of completion status. See flow sheet(s) for additional details.  Post-Vitals listed below.    Post-procedure vitals: @ 1447  Vitals  BP: (P) 99/62  Temp: (P) 36.4 °C (97.5 °F)  Heart Rate: (P) 67  Resp: (P) 14  SpO2: (P) 97 % ALYSSA NOLASCO RN

## 2025-01-20 NOTE — PROGRESS NOTES
"Senait Narvaez is a 54 y.o. female on day 1 of admission presenting with Sickle cell anemia with pain (Multi).    Subjective   Feeling ok this morning, just got apheresis line placed for sched routine today. Has been in pain x 1 week, taking oxycodone and dilaudid for it. Discussed re- starting home coumadin today. Ordering lunch now.     Denies any HA, dizziness/lightheadedness, fevers/chills, cough, congestion, rhinorrhea, sore throat, SOB, CP, palpitations, abdominal pain, n/v/d/c, melena, dysuria, urgency/frequency, hematuria, numbness/tingling, bruising/bleeding or rashes. Denies any sick contacts. ROS otherwise negative.       Objective     Physical Exam  Vitals reviewed.   Constitutional:       Appearance: Normal appearance.   HENT:      Head: Normocephalic and atraumatic.      Nose: Nose normal.      Mouth/Throat:      Mouth: Mucous membranes are moist.      Pharynx: Oropharynx is clear.   Eyes:      Extraocular Movements: Extraocular movements intact.      Pupils: Pupils are equal, round, and reactive to light.   Cardiovascular:      Rate and Rhythm: Normal rate and regular rhythm.      Pulses: Normal pulses.      Heart sounds: Normal heart sounds.   Pulmonary:      Effort: Pulmonary effort is normal.      Breath sounds: Normal breath sounds.   Abdominal:      General: Bowel sounds are normal.      Palpations: Abdomen is soft.   Musculoskeletal:         General: Normal range of motion.   Skin:     General: Skin is warm.   Neurological:      General: No focal deficit present.      Mental Status: She is alert and oriented to person, place, and time. Mental status is at baseline.   Psychiatric:         Mood and Affect: Mood normal.         Behavior: Behavior normal.     Last Recorded Vitals  Blood pressure 112/74, pulse 74, temperature 36.5 °C (97.7 °F), temperature source Temporal, resp. rate 18, height 1.655 m (5' 5.16\"), weight 97.1 kg (214 lb), SpO2 96%.  Intake/Output last 3 Shifts:  No intake/output data " recorded.       This patient has a central line   Reason for the central line remaining today? Dialysis/Hemapheresis       Assessment/Plan   Assessment & Plan  Sickle cell anemia without crisis (Multi)    Senait Narvaez is a 54 y.o. 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 Apheresis line placed 1/20, and now plan for RBC exchange.       # Hb SC disease   - currently on q6 week RBC exchange transfusions (most recent 12/13/24); planned for 1/20/25  - [facility] last updated 7/25/24  - OARRS reviewed, no aberrant behavior noted (last filled oxycodone 1/14 x12 days qty 75 tabs)  - Hemolysis labs are at baseline, pre-exchange labs obtained 1/17  - apheresis line placed in IR 1/20  - 1/17 Hg S 24.2% C 23.7%  - continue 2L night time oxygen   - c/w home folic acid 1 mg daily  - COVID/Flu neg 1/29  - (1/19- current) continue home oxycodone 10mg q4 PRN severe pain + I/s/o of acute on chronic pain started 1mg IVP dilaudid q4 PRN breakthrough  - bowel regimen for opioid induced constipation, zofran for opioid induced nausea, and benadryl for opioid induced pruritis      # L Breast Mass  - Initially noted a couple of months ago   - 10/16 Mammogram showing edema likely vaso-occlusive etiology, no evidence of malignancy   - in setting of no improvement since then and continues edema and pain, plan for repeat US   - 1/3/25 L breast US showed indeterminate L breast mass at 1 o'clock extending to subareolar region and indeterminate L breast mass at 11 o clock, further eval w/  surgical consultation and US guided bx recommended  - 1/17 [facility]ished with [facility], rec biopsy   - Plan for US guided breast biopsy on 1/30     # DVT, PE  # SVC thrombus  - Held Warfarin on 1/19 for apheresis line - plan to restart 1/20 PM  - usually after apheresis line placed, pt started on lovenox bridge - start lovenox 1mg/kg (100mg daily) 1/20 PM with resumed coumadin 5mg daily dose  - goal INR 2-3  - Daily coags     # SVT  # pHTN  - previously tachycardic to 178 on admit in Dec 2024 -asked to bear down and converted to NSR HR 90s   - c/w home metop 12.5 BID and Lasix 20 every other day   - 1/19 started on telemetry on admit       # dispo   - Full code - confirmed on admit  - DC home resumed noc O2 post-exchange and pain controlled  - SURROGATE DECISION MAKER: Tati ([facility]) [phone]  - FUV 1/30 Breast bx, 2/13 Cardiology     I spent 60 minutes in the professional and overall care of this patient.    Vero Razo, APRN-CNP

## 2025-01-20 NOTE — PROGRESS NOTES
01/20/25 1300   Discharge Planning   Living Arrangements Alone   Support Systems Children;Parent   Type of Residence Private residence   Home or Post Acute Services None   Expected Discharge Disposition Home   Financial Resource Strain   How hard is it for you to pay for the very basics like food, housing, medical care, and heating? Not hard   Housing Stability   In the last 12 months, was there a time when you were not able to pay the mortgage or rent on time? N   In the past 12 months, how many times have you moved where you were living? 0   At any time in the past 12 months, were you homeless or living in a shelter (including now)? N   Transportation Needs   In the past 12 months, has lack of transportation kept you from medical appointments or from getting medications? no   In the past 12 months, has lack of transportation kept you from meetings, work, or from getting things needed for daily living? No     01/20/2025: TCC spoke with patient at bedside regarding discharge plan. Currently lives with children in an apartment in [place], [place]. Currently has no skilled needs at this moment. TCC to continue to follow for any additional needs. JERICA Espinoza, TCC

## 2025-01-21 LAB
ALBUMIN SERPL BCP-MCNC: 2.9 G/DL (ref 3.4–5)
ALP SERPL-CCNC: 118 U/L (ref 33–110)
ALT SERPL W P-5'-P-CCNC: 13 U/L (ref 7–45)
ANION GAP SERPL CALC-SCNC: 15 MMOL/L (ref 10–20)
APTT PPP: 28 SECONDS (ref 27–38)
APTT PPP: 31 SECONDS (ref 27–38)
AST SERPL W P-5'-P-CCNC: 20 U/L (ref 9–39)
BASOPHILS # BLD MANUAL: 0.18 X10*3/UL (ref 0–0.1)
BASOPHILS NFR BLD MANUAL: 1.9 %
BILIRUB SERPL-MCNC: 1.4 MG/DL (ref 0–1.2)
BUN SERPL-MCNC: 15 MG/DL (ref 6–23)
BURR CELLS BLD QL SMEAR: ABNORMAL
CALCIUM SERPL-MCNC: 9.1 MG/DL (ref 8.6–10.6)
CHLORIDE SERPL-SCNC: 96 MMOL/L (ref 98–107)
CO2 SERPL-SCNC: 31 MMOL/L (ref 21–32)
CREAT SERPL-MCNC: 1.03 MG/DL (ref 0.5–1.05)
EGFRCR SERPLBLD CKD-EPI 2021: 64 ML/MIN/1.73M*2
EOSINOPHIL # BLD MANUAL: 0.26 X10*3/UL (ref 0–0.7)
EOSINOPHIL NFR BLD MANUAL: 2.8 %
ERYTHROCYTE [DISTWIDTH] IN BLOOD BY AUTOMATED COUNT: 23.5 % (ref 11.5–14.5)
GLUCOSE BLD MANUAL STRIP-MCNC: 110 MG/DL (ref 74–99)
GLUCOSE SERPL-MCNC: 22 MG/DL (ref 74–99)
HCT VFR BLD AUTO: 31.7 % (ref 36–46)
HEMOGLOBIN A2: 2.6 % (ref 2–3.5)
HEMOGLOBIN A: 63.7 % (ref 95.8–98)
HEMOGLOBIN C: 16.5 %
HEMOGLOBIN F: 0.5 % (ref 0–2)
HEMOGLOBIN IDENTIFICATION INTERPRETATION: ABNORMAL
HEMOGLOBIN S: 16.7 %
HGB BLD-MCNC: 10.4 G/DL (ref 12–16)
HGB RETIC QN: 31 PG (ref 28–38)
HYPOCHROMIA BLD QL SMEAR: ABNORMAL
IMM GRANULOCYTES # BLD AUTO: 0.03 X10*3/UL (ref 0–0.7)
IMM GRANULOCYTES NFR BLD AUTO: 0.3 % (ref 0–0.9)
IMMATURE RETIC FRACTION: 28.2 %
INR PPP: 1.1 (ref 0.9–1.1)
INR PPP: 1.2 (ref 0.9–1.1)
LDH SERPL L TO P-CCNC: 230 U/L (ref 84–246)
LYMPHOCYTES # BLD MANUAL: 1.78 X10*3/UL (ref 1.2–4.8)
LYMPHOCYTES NFR BLD MANUAL: 18.9 %
MCH RBC QN AUTO: 26.8 PG (ref 26–34)
MCHC RBC AUTO-ENTMCNC: 32.8 G/DL (ref 32–36)
MCV RBC AUTO: 82 FL (ref 80–100)
MONOCYTES # BLD MANUAL: 0.54 X10*3/UL (ref 0.1–1)
MONOCYTES NFR BLD MANUAL: 5.7 %
NEUTS SEG # BLD MANUAL: 5.58 X10*3/UL (ref 1.2–7)
NEUTS SEG NFR BLD MANUAL: 59.4 %
NRBC BLD-RTO: 5.9 /100 WBCS (ref 0–0)
PATH REVIEW-HGB IDENTIFICATION: ABNORMAL
PLATELET # BLD AUTO: 223 X10*3/UL (ref 150–450)
POTASSIUM SERPL-SCNC: 4.5 MMOL/L (ref 3.5–5.3)
PROT SERPL-MCNC: 6.5 G/DL (ref 6.4–8.2)
PROTHROMBIN TIME: 12.5 SECONDS (ref 9.8–12.8)
PROTHROMBIN TIME: 13.4 SECONDS (ref 9.8–12.8)
RBC # BLD AUTO: 3.88 X10*6/UL (ref 4–5.2)
RBC MORPH BLD: ABNORMAL
RETICS #: 0.18 X10*6/UL (ref 0.02–0.08)
RETICS/RBC NFR AUTO: 4.5 % (ref 0.5–2)
SODIUM SERPL-SCNC: 137 MMOL/L (ref 136–145)
TARGETS BLD QL SMEAR: ABNORMAL
TOTAL CELLS COUNTED BLD: 106
VARIANT LYMPHS # BLD MANUAL: 1.06 X10*3/UL (ref 0–0.5)
VARIANT LYMPHS NFR BLD: 11.3 %
WBC # BLD AUTO: 9.4 X10*3/UL (ref 4.4–11.3)

## 2025-01-21 PROCEDURE — 2500000001 HC RX 250 WO HCPCS SELF ADMINISTERED DRUGS (ALT 637 FOR MEDICARE OP)

## 2025-01-21 PROCEDURE — 83615 LACTATE (LD) (LDH) ENZYME: CPT

## 2025-01-21 PROCEDURE — 2500000004 HC RX 250 GENERAL PHARMACY W/ HCPCS (ALT 636 FOR OP/ED): Performed by: NURSE PRACTITIONER

## 2025-01-21 PROCEDURE — 80053 COMPREHEN METABOLIC PANEL: CPT

## 2025-01-21 PROCEDURE — 2500000001 HC RX 250 WO HCPCS SELF ADMINISTERED DRUGS (ALT 637 FOR MEDICARE OP): Performed by: NURSE PRACTITIONER

## 2025-01-21 PROCEDURE — 2500000004 HC RX 250 GENERAL PHARMACY W/ HCPCS (ALT 636 FOR OP/ED)

## 2025-01-21 PROCEDURE — 85610 PROTHROMBIN TIME: CPT

## 2025-01-21 PROCEDURE — 99233 SBSQ HOSP IP/OBS HIGH 50: CPT

## 2025-01-21 PROCEDURE — 85025 COMPLETE CBC W/AUTO DIFF WBC: CPT

## 2025-01-21 PROCEDURE — 1170000001 HC PRIVATE ONCOLOGY ROOM DAILY

## 2025-01-21 PROCEDURE — 82947 ASSAY GLUCOSE BLOOD QUANT: CPT

## 2025-01-21 PROCEDURE — 85027 COMPLETE CBC AUTOMATED: CPT

## 2025-01-21 PROCEDURE — 85045 AUTOMATED RETICULOCYTE COUNT: CPT

## 2025-01-21 PROCEDURE — 2500000002 HC RX 250 W HCPCS SELF ADMINISTERED DRUGS (ALT 637 FOR MEDICARE OP, ALT 636 FOR OP/ED): Performed by: NURSE PRACTITIONER

## 2025-01-21 PROCEDURE — 85007 BL SMEAR W/DIFF WBC COUNT: CPT

## 2025-01-21 RX ORDER — HYDROMORPHONE HYDROCHLORIDE 1 MG/ML
1 INJECTION, SOLUTION INTRAMUSCULAR; INTRAVENOUS; SUBCUTANEOUS EVERY 2 HOUR PRN
Status: DISCONTINUED | OUTPATIENT
Start: 2025-01-21 | End: 2025-01-21

## 2025-01-21 RX ORDER — HYDROMORPHONE HYDROCHLORIDE 1 MG/ML
1 INJECTION, SOLUTION INTRAMUSCULAR; INTRAVENOUS; SUBCUTANEOUS EVERY 2 HOUR PRN
Status: DISCONTINUED | OUTPATIENT
Start: 2025-01-21 | End: 2025-01-23

## 2025-01-21 RX ADMIN — HYDROMORPHONE HYDROCHLORIDE 1 MG: 1 INJECTION, SOLUTION INTRAMUSCULAR; INTRAVENOUS; SUBCUTANEOUS at 17:04

## 2025-01-21 RX ADMIN — OXYCODONE HYDROCHLORIDE 10 MG: 10 TABLET ORAL at 09:18

## 2025-01-21 RX ADMIN — OXYCODONE HYDROCHLORIDE 10 MG: 10 TABLET ORAL at 05:12

## 2025-01-21 RX ADMIN — HYDROMORPHONE HYDROCHLORIDE 1 MG: 1 INJECTION, SOLUTION INTRAMUSCULAR; INTRAVENOUS; SUBCUTANEOUS at 22:03

## 2025-01-21 RX ADMIN — HYDROMORPHONE HYDROCHLORIDE 1 MG: 1 INJECTION, SOLUTION INTRAMUSCULAR; INTRAVENOUS; SUBCUTANEOUS at 02:25

## 2025-01-21 RX ADMIN — HYDROMORPHONE HYDROCHLORIDE 1 MG: 1 INJECTION, SOLUTION INTRAMUSCULAR; INTRAVENOUS; SUBCUTANEOUS at 06:30

## 2025-01-21 RX ADMIN — OXYCODONE HYDROCHLORIDE 10 MG: 10 TABLET ORAL at 00:59

## 2025-01-21 RX ADMIN — HYDROMORPHONE HYDROCHLORIDE 1 MG: 1 INJECTION, SOLUTION INTRAMUSCULAR; INTRAVENOUS; SUBCUTANEOUS at 10:36

## 2025-01-21 RX ADMIN — WARFARIN SODIUM 5 MG: 5 TABLET ORAL at 22:06

## 2025-01-21 RX ADMIN — ENOXAPARIN SODIUM 100 MG: 100 INJECTION SUBCUTANEOUS at 22:06

## 2025-01-21 RX ADMIN — FOLIC ACID 1 MG: 1 TABLET ORAL at 09:18

## 2025-01-21 RX ADMIN — HYDROMORPHONE HYDROCHLORIDE 1 MG: 1 INJECTION, SOLUTION INTRAMUSCULAR; INTRAVENOUS; SUBCUTANEOUS at 19:09

## 2025-01-21 RX ADMIN — CETIRIZINE HYDROCHLORIDE 10 MG: 10 TABLET, FILM COATED ORAL at 09:18

## 2025-01-21 RX ADMIN — METOPROLOL TARTRATE 25 MG: 25 TABLET, FILM COATED ORAL at 09:18

## 2025-01-21 RX ADMIN — HYDROMORPHONE HYDROCHLORIDE 1 MG: 1 INJECTION, SOLUTION INTRAMUSCULAR; INTRAVENOUS; SUBCUTANEOUS at 14:59

## 2025-01-21 RX ADMIN — HYDROMORPHONE HYDROCHLORIDE 1 MG: 1 INJECTION, SOLUTION INTRAMUSCULAR; INTRAVENOUS; SUBCUTANEOUS at 12:51

## 2025-01-21 RX ADMIN — METOPROLOL TARTRATE 25 MG: 25 TABLET, FILM COATED ORAL at 22:03

## 2025-01-21 ASSESSMENT — COGNITIVE AND FUNCTIONAL STATUS - GENERAL
DAILY ACTIVITIY SCORE: 24
MOBILITY SCORE: 24
MOBILITY SCORE: 24
DAILY ACTIVITIY SCORE: 24

## 2025-01-21 ASSESSMENT — PAIN SCALES - GENERAL
PAINLEVEL_OUTOF10: 8
PAINLEVEL_OUTOF10: 5 - MODERATE PAIN
PAINLEVEL_OUTOF10: 8
PAINLEVEL_OUTOF10: 8
PAINLEVEL_OUTOF10: 7
PAINLEVEL_OUTOF10: 8
PAINLEVEL_OUTOF10: 8
PAINLEVEL_OUTOF10: 7
PAINLEVEL_OUTOF10: 8
PAINLEVEL_OUTOF10: 7
PAINLEVEL_OUTOF10: 6
PAINLEVEL_OUTOF10: 6
PAINLEVEL_OUTOF10: 7

## 2025-01-21 ASSESSMENT — PAIN - FUNCTIONAL ASSESSMENT
PAIN_FUNCTIONAL_ASSESSMENT: 0-10

## 2025-01-21 NOTE — PROGRESS NOTES
"Senait Narvaez is a 54 y.o. female on day 2 of admission presenting with Sickle cell anemia with pain (Multi).    Subjective   Seen this AM at bedside. Pt c/o increased pain, rates pain 8/10, located in her BL legs. This pain has increased since her scheduled exchange yesterday. Discussed switching up her pain regimen today to try to catch up to the pain. Reports no issues with voiding; last BM 1/21. Denies any HA, dizziness/lightheadedness, fevers/chills, cough, congestion, rhinorrhea, sore throat, SOB, CP, palpitations, abdominal pain, n/v/d/c, melena, dysuria, urgency/frequency, hematuria, numbness/tingling, bruising/bleeding or rashes. Denies any sick contacts. ROS otherwise negative.       Objective     Physical Exam  Vitals reviewed.   Constitutional:       Appearance: Normal appearance.   HENT:      Head: Normocephalic and atraumatic.      Nose: Nose normal.      Mouth/Throat:      Mouth: Mucous membranes are moist.      Pharynx: Oropharynx is clear.   Eyes:      Extraocular Movements: Extraocular movements intact.      Pupils: Pupils are equal, round, and reactive to light.   Cardiovascular:      Rate and Rhythm: Normal rate and regular rhythm.      Pulses: Normal pulses.      Heart sounds: Normal heart sounds.   Pulmonary:      Effort: Pulmonary effort is normal.      Breath sounds: Normal breath sounds.   Abdominal:      General: Bowel sounds are normal.      Palpations: Abdomen is soft.   Musculoskeletal:         General: Normal range of motion.   Skin:     General: Skin is warm.   Neurological:      General: No focal deficit present.      Mental Status: She is alert and oriented to person, place, and time. Mental status is at baseline.   Psychiatric:         Mood and Affect: Mood normal.         Behavior: Behavior normal.   Last Recorded Vitals  Blood pressure 104/70, pulse 96, temperature 36.3 °C (97.3 °F), temperature source Temporal, resp. rate 18, height 1.655 m (5' 5.16\"), weight 97.1 kg (214 lb), SpO2 " 92%.  Intake/Output last 3 Shifts:  I/O last 3 completed shifts:  In: 1592 (16.4 mL/kg) [Other:1592]  Out: 1590 (16.4 mL/kg) [Other:1590]  Weight: 97.1 kg        This patient has a central line   Reason for the central line remaining today? Dialysis/Hemapheresis       Assessment/Plan   Assessment & Plan  Sickle cell anemia without crisis (Multi)    Senait Narvaez is a 54 y.o. 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 Apheresis line placed 1/20, s/p RBC exchange on 1/20. Pt reporting increase in sickle cell pain post-exchange located in BL legs; pain regimen switched to IV Dilaudid for pain control (1/21- ). DC home with resumed noc O2 pending pain improvement.      # Hb SC disease   - currently on q6 week RBC exchange transfusions (most recent 12/13/24); planned for 1/20/25  - [facility] last updated 7/25/24  - OARRS reviewed, no aberrant behavior noted (last filled oxycodone 1/14 x12 days qty 75 tabs)  - Hemolysis labs are at baseline, pre-exchange labs obtained 1/17  - apheresis line placed in IR 1/20  - 1/17 Hg S 24.2% post exchange hgb S 16.7% (1/20)  - continue 2L night time oxygen   - c/w home folic acid 1 mg daily  - COVID/Flu neg 1/29  - (1/19-1/21) continue home oxycodone 10mg q4 PRN severe pain + i/s/o of acute on chronic pain started 1mg IVP dilaudid q4 PRN breakthrough  - start IV Dilaudid 1mg Q2 PRN for severe pain (1/21- )  - bowel regimen for opioid induced constipation, zofran for opioid induced nausea, and benadryl for opioid induced pruritis       # L Breast Mass  - Initially noted a couple of months ago   - 10/16 Mammogram showing edema likely vaso-occlusive etiology, no evidence of malignancy   - in setting of no improvement since then and continues edema and pain, plan for repeat US   - 1/3/25 L breast US showed indeterminate L breast mass at 1 o'clock extending to subareolar region and indeterminate L breast mass at 11 o clock, further eval w/ surgical consultation and US guided bx recommended  - 1/17 [facility]ished with [facility], rec biopsy   - plan for US guided breast biopsy on 1/30     # DVT, PE  # SVC thrombus  - Warfarin held 1/19 for apheresis line, restarted post line placement (1/19)  - pt started on lovenox bridge post line placement - start lovenox 1mg/kg (100mg daily) 1/20 PM with resumed coumadin 5mg daily, dose until reach INR goal  - goal INR 2-3  - Daily coags     # SVT  # pHTN  - previously tachycardic to 178 on admit in Dec 2024 -asked to bear down and converted to NSR HR 90s   - c/w home metop 12.5 BID and Lasix 20 every other day   - 1/19 started on telemetry on admit       # dispo   - Full code - confirmed on admit  - DC home resumed noc O2 post-exchange and pain controlled  - SURROGATE DECISION MAKER: Tati ([facility]) [phone]  - FUV 1/30 Breast bx, 2/13 Cardiology     I spent 60 minutes in the professional and overall care of this patient.    Assessment and plan as above discussed with attending physician Dr. Hensley.    Leanna Steven, APRN-CNP

## 2025-01-21 NOTE — PROGRESS NOTES
Spiritual Care Visit     Reason for Visit:  Routine Visit: Follow-up  Continue Visiting: Yes     Focus of Care:  Visited With: Patient         Spiritual Care Annotation    Annotation:   visited patient Senait Narvaez. Patient and  known to one another from previous admissions. Patient welcomed visit from facility Cora rodriguez. Hand  was provided to patient before and after visit.     Patient shared that she has been doing well and enjoyed peaceful holidays with her mom, daughter, and granddaughter. Patient shared she is going to add a Yorkie puppy to her family later this week that she is looking forward to. Patient was appreciative of visit and did not have any needs at this time. Spiritual Care remains available as needed/requested.    Rev. Anita Pandya MDiv, BCC

## 2025-01-21 NOTE — CARE PLAN
Problem: Pain - Adult  Goal: Verbalizes/displays adequate comfort level or baseline comfort level  Outcome: Progressing     Problem: Safety - Adult  Goal: Free from fall injury  Outcome: Progressing     Problem: Discharge Planning  Goal: Discharge to home or other facility with appropriate resources  Outcome: Progressing     Problem: Chronic Conditions and Co-morbidities  Goal: Patient's chronic conditions and co-morbidity symptoms are monitored and maintained or improved  Outcome: Progressing     Problem: Pain  Goal: Takes deep breaths with improved pain control throughout the shift  Outcome: Progressing  Goal: Turns in bed with improved pain control throughout the shift  Outcome: Progressing  Goal: Walks with improved pain control throughout the shift  Outcome: Progressing  Goal: Performs ADL's with improved pain control throughout shift  Outcome: Progressing  Goal: Free from opioid side effects throughout the shift  Outcome: Progressing  Goal: Free from acute confusion related to pain meds throughout the shift  Outcome: Progressing       The clinical goals for the shift include Pt will report pain less than 7/10 throughout shift.

## 2025-01-22 LAB
ALBUMIN SERPL BCP-MCNC: 3.3 G/DL (ref 3.4–5)
ALBUMIN SERPL BCP-MCNC: 3.3 G/DL (ref 3.4–5)
ALP SERPL-CCNC: 146 U/L (ref 33–110)
ALP SERPL-CCNC: 148 U/L (ref 33–110)
ALT SERPL W P-5'-P-CCNC: 11 U/L (ref 7–45)
ALT SERPL W P-5'-P-CCNC: 12 U/L (ref 7–45)
ANION GAP SERPL CALC-SCNC: 11 MMOL/L (ref 10–20)
ANION GAP SERPL CALC-SCNC: 9 MMOL/L (ref 10–20)
APTT PPP: 28 SECONDS (ref 27–38)
AST SERPL W P-5'-P-CCNC: 24 U/L (ref 9–39)
AST SERPL W P-5'-P-CCNC: 36 U/L (ref 9–39)
BASOPHILS # BLD AUTO: 0.02 X10*3/UL (ref 0–0.1)
BASOPHILS # BLD MANUAL: 0 X10*3/UL (ref 0–0.1)
BASOPHILS NFR BLD AUTO: 0.2 %
BASOPHILS NFR BLD MANUAL: 0 %
BILIRUB SERPL-MCNC: 1.3 MG/DL (ref 0–1.2)
BILIRUB SERPL-MCNC: 1.4 MG/DL (ref 0–1.2)
BUN SERPL-MCNC: 15 MG/DL (ref 6–23)
BUN SERPL-MCNC: 15 MG/DL (ref 6–23)
CALCIUM SERPL-MCNC: 9.1 MG/DL (ref 8.6–10.6)
CALCIUM SERPL-MCNC: 9.3 MG/DL (ref 8.6–10.6)
CHLORIDE SERPL-SCNC: 98 MMOL/L (ref 98–107)
CHLORIDE SERPL-SCNC: 99 MMOL/L (ref 98–107)
CO2 SERPL-SCNC: 29 MMOL/L (ref 21–32)
CO2 SERPL-SCNC: 31 MMOL/L (ref 21–32)
CREAT SERPL-MCNC: 0.88 MG/DL (ref 0.5–1.05)
CREAT SERPL-MCNC: 0.97 MG/DL (ref 0.5–1.05)
EGFRCR SERPLBLD CKD-EPI 2021: 69 ML/MIN/1.73M*2
EGFRCR SERPLBLD CKD-EPI 2021: 78 ML/MIN/1.73M*2
EOSINOPHIL # BLD AUTO: 0.46 X10*3/UL (ref 0–0.7)
EOSINOPHIL # BLD MANUAL: 0.55 X10*3/UL (ref 0–0.7)
EOSINOPHIL NFR BLD AUTO: 5.7 %
EOSINOPHIL NFR BLD MANUAL: 7.1 %
ERYTHROCYTE [DISTWIDTH] IN BLOOD BY AUTOMATED COUNT: 23.4 % (ref 11.5–14.5)
ERYTHROCYTE [DISTWIDTH] IN BLOOD BY AUTOMATED COUNT: 24 % (ref 11.5–14.5)
GLUCOSE SERPL-MCNC: 84 MG/DL (ref 74–99)
GLUCOSE SERPL-MCNC: 86 MG/DL (ref 74–99)
HCT VFR BLD AUTO: 31.8 % (ref 36–46)
HCT VFR BLD AUTO: 32.6 % (ref 36–46)
HGB BLD-MCNC: 10.6 G/DL (ref 12–16)
HGB BLD-MCNC: 11 G/DL (ref 12–16)
HGB RETIC QN: 28 PG (ref 28–38)
HYPOCHROMIA BLD QL SMEAR: NORMAL
IMM GRANULOCYTES # BLD AUTO: 0.02 X10*3/UL (ref 0–0.7)
IMM GRANULOCYTES # BLD AUTO: 0.06 X10*3/UL (ref 0–0.7)
IMM GRANULOCYTES NFR BLD AUTO: 0.2 % (ref 0–0.9)
IMM GRANULOCYTES NFR BLD AUTO: 0.8 % (ref 0–0.9)
IMMATURE RETIC FRACTION: 31.9 %
INR PPP: 1.1 (ref 0.9–1.1)
LDH SERPL L TO P-CCNC: 215 U/L (ref 84–246)
LYMPHOCYTES # BLD AUTO: 1.86 X10*3/UL (ref 1.2–4.8)
LYMPHOCYTES # BLD MANUAL: 1.56 X10*3/UL (ref 1.2–4.8)
LYMPHOCYTES NFR BLD AUTO: 23.1 %
LYMPHOCYTES NFR BLD MANUAL: 20.3 %
MCH RBC QN AUTO: 26.7 PG (ref 26–34)
MCH RBC QN AUTO: 27.2 PG (ref 26–34)
MCHC RBC AUTO-ENTMCNC: 33.3 G/DL (ref 32–36)
MCHC RBC AUTO-ENTMCNC: 33.7 G/DL (ref 32–36)
MCV RBC AUTO: 80 FL (ref 80–100)
MCV RBC AUTO: 81 FL (ref 80–100)
MONOCYTES # BLD AUTO: 0.62 X10*3/UL (ref 0.1–1)
MONOCYTES # BLD MANUAL: 0.2 X10*3/UL (ref 0.1–1)
MONOCYTES NFR BLD AUTO: 7.7 %
MONOCYTES NFR BLD MANUAL: 2.6 %
NEUTROPHILS # BLD AUTO: 5.08 X10*3/UL (ref 1.2–7.7)
NEUTROPHILS NFR BLD AUTO: 63.1 %
NEUTS SEG # BLD MANUAL: 5.18 X10*3/UL (ref 1.2–7)
NEUTS SEG NFR BLD MANUAL: 67.3 %
NRBC BLD-RTO: 9.4 /100 WBCS (ref 0–0)
NRBC BLD-RTO: 9.6 /100 WBCS (ref 0–0)
PLATELET # BLD AUTO: 217 X10*3/UL (ref 150–450)
PLATELET # BLD AUTO: 269 X10*3/UL (ref 150–450)
POLYCHROMASIA BLD QL SMEAR: NORMAL
POTASSIUM SERPL-SCNC: 4.1 MMOL/L (ref 3.5–5.3)
POTASSIUM SERPL-SCNC: 4.7 MMOL/L (ref 3.5–5.3)
PROT SERPL-MCNC: 6.9 G/DL (ref 6.4–8.2)
PROT SERPL-MCNC: 7.4 G/DL (ref 6.4–8.2)
PROTHROMBIN TIME: 12.6 SECONDS (ref 9.8–12.8)
RBC # BLD AUTO: 3.97 X10*6/UL (ref 4–5.2)
RBC # BLD AUTO: 4.04 X10*6/UL (ref 4–5.2)
RBC MORPH BLD: NORMAL
RBC MORPH BLD: NORMAL
RETICS #: 0.25 X10*6/UL (ref 0.02–0.08)
RETICS/RBC NFR AUTO: 6.2 % (ref 0.5–2)
SODIUM SERPL-SCNC: 133 MMOL/L (ref 136–145)
SODIUM SERPL-SCNC: 135 MMOL/L (ref 136–145)
TARGETS BLD QL SMEAR: NORMAL
TARGETS BLD QL SMEAR: NORMAL
TOTAL CELLS COUNTED BLD: 113
VARIANT LYMPHS # BLD MANUAL: 0.21 X10*3/UL (ref 0–0.5)
VARIANT LYMPHS NFR BLD: 2.7 %
WBC # BLD AUTO: 7.7 X10*3/UL (ref 4.4–11.3)
WBC # BLD AUTO: 8.1 X10*3/UL (ref 4.4–11.3)

## 2025-01-22 PROCEDURE — 85610 PROTHROMBIN TIME: CPT

## 2025-01-22 PROCEDURE — 83615 LACTATE (LD) (LDH) ENZYME: CPT

## 2025-01-22 PROCEDURE — 2500000001 HC RX 250 WO HCPCS SELF ADMINISTERED DRUGS (ALT 637 FOR MEDICARE OP)

## 2025-01-22 PROCEDURE — 85027 COMPLETE CBC AUTOMATED: CPT

## 2025-01-22 PROCEDURE — 2500000004 HC RX 250 GENERAL PHARMACY W/ HCPCS (ALT 636 FOR OP/ED): Performed by: NURSE PRACTITIONER

## 2025-01-22 PROCEDURE — 85007 BL SMEAR W/DIFF WBC COUNT: CPT

## 2025-01-22 PROCEDURE — 1170000001 HC PRIVATE ONCOLOGY ROOM DAILY

## 2025-01-22 PROCEDURE — 85045 AUTOMATED RETICULOCYTE COUNT: CPT

## 2025-01-22 PROCEDURE — 2500000004 HC RX 250 GENERAL PHARMACY W/ HCPCS (ALT 636 FOR OP/ED)

## 2025-01-22 PROCEDURE — 80053 COMPREHEN METABOLIC PANEL: CPT

## 2025-01-22 PROCEDURE — 2500000001 HC RX 250 WO HCPCS SELF ADMINISTERED DRUGS (ALT 637 FOR MEDICARE OP): Performed by: NURSE PRACTITIONER

## 2025-01-22 PROCEDURE — 99233 SBSQ HOSP IP/OBS HIGH 50: CPT

## 2025-01-22 PROCEDURE — 2500000002 HC RX 250 W HCPCS SELF ADMINISTERED DRUGS (ALT 637 FOR MEDICARE OP, ALT 636 FOR OP/ED): Performed by: NURSE PRACTITIONER

## 2025-01-22 RX ADMIN — HYDROMORPHONE HYDROCHLORIDE 1 MG: 1 INJECTION, SOLUTION INTRAMUSCULAR; INTRAVENOUS; SUBCUTANEOUS at 15:12

## 2025-01-22 RX ADMIN — HYDROMORPHONE HYDROCHLORIDE 1 MG: 1 INJECTION, SOLUTION INTRAMUSCULAR; INTRAVENOUS; SUBCUTANEOUS at 12:54

## 2025-01-22 RX ADMIN — HYDROMORPHONE HYDROCHLORIDE 1 MG: 1 INJECTION, SOLUTION INTRAMUSCULAR; INTRAVENOUS; SUBCUTANEOUS at 10:39

## 2025-01-22 RX ADMIN — METOPROLOL TARTRATE 25 MG: 25 TABLET, FILM COATED ORAL at 19:58

## 2025-01-22 RX ADMIN — HYDROMORPHONE HYDROCHLORIDE 1 MG: 1 INJECTION, SOLUTION INTRAMUSCULAR; INTRAVENOUS; SUBCUTANEOUS at 05:53

## 2025-01-22 RX ADMIN — HYDROMORPHONE HYDROCHLORIDE 1 MG: 1 INJECTION, SOLUTION INTRAMUSCULAR; INTRAVENOUS; SUBCUTANEOUS at 00:26

## 2025-01-22 RX ADMIN — HYDROMORPHONE HYDROCHLORIDE 1 MG: 1 INJECTION, SOLUTION INTRAMUSCULAR; INTRAVENOUS; SUBCUTANEOUS at 17:20

## 2025-01-22 RX ADMIN — HYDROMORPHONE HYDROCHLORIDE 1 MG: 1 INJECTION, SOLUTION INTRAMUSCULAR; INTRAVENOUS; SUBCUTANEOUS at 19:54

## 2025-01-22 RX ADMIN — WARFARIN SODIUM 5 MG: 5 TABLET ORAL at 17:20

## 2025-01-22 RX ADMIN — ENOXAPARIN SODIUM 100 MG: 100 INJECTION SUBCUTANEOUS at 17:20

## 2025-01-22 RX ADMIN — HYDROMORPHONE HYDROCHLORIDE 1 MG: 1 INJECTION, SOLUTION INTRAMUSCULAR; INTRAVENOUS; SUBCUTANEOUS at 22:02

## 2025-01-22 RX ADMIN — HYDROMORPHONE HYDROCHLORIDE 1 MG: 1 INJECTION, SOLUTION INTRAMUSCULAR; INTRAVENOUS; SUBCUTANEOUS at 03:02

## 2025-01-22 RX ADMIN — CETIRIZINE HYDROCHLORIDE 10 MG: 10 TABLET, FILM COATED ORAL at 08:22

## 2025-01-22 RX ADMIN — HYDROMORPHONE HYDROCHLORIDE 1 MG: 1 INJECTION, SOLUTION INTRAMUSCULAR; INTRAVENOUS; SUBCUTANEOUS at 08:22

## 2025-01-22 RX ADMIN — FOLIC ACID 1 MG: 1 TABLET ORAL at 08:22

## 2025-01-22 RX ADMIN — FUROSEMIDE 20 MG: 20 TABLET ORAL at 08:22

## 2025-01-22 RX ADMIN — METOPROLOL TARTRATE 25 MG: 25 TABLET, FILM COATED ORAL at 08:22

## 2025-01-22 ASSESSMENT — COGNITIVE AND FUNCTIONAL STATUS - GENERAL
DAILY ACTIVITIY SCORE: 24
DAILY ACTIVITIY SCORE: 24
MOBILITY SCORE: 24
MOBILITY SCORE: 24

## 2025-01-22 ASSESSMENT — PAIN - FUNCTIONAL ASSESSMENT
PAIN_FUNCTIONAL_ASSESSMENT: 0-10
PAIN_FUNCTIONAL_ASSESSMENT: UNABLE TO SELF-REPORT
PAIN_FUNCTIONAL_ASSESSMENT: 0-10

## 2025-01-22 ASSESSMENT — PAIN SCALES - GENERAL
PAINLEVEL_OUTOF10: 7
PAINLEVEL_OUTOF10: 7
PAINLEVEL_OUTOF10: 8
PAINLEVEL_OUTOF10: 8
PAINLEVEL_OUTOF10: 7
PAINLEVEL_OUTOF10: 8
PAINLEVEL_OUTOF10: 7
PAINLEVEL_OUTOF10: 8
PAINLEVEL_OUTOF10: 8
PAINLEVEL_OUTOF10: 6
PAINLEVEL_OUTOF10: 7
PAINLEVEL_OUTOF10: 7
PAINLEVEL_OUTOF10: 8
PAINLEVEL_OUTOF10: 8
PAINLEVEL_OUTOF10: 7
PAINLEVEL_OUTOF10: 8

## 2025-01-22 NOTE — PROGRESS NOTES
Senait Narvaez is a 54 y.o. female on day 3 of admission presenting with Sickle cell anemia with pain (Multi).    Subjective   Seen this AM at bedside. Pt reports 8/10 pain, located in BL legs and back. She states that she noticed increased swelling in BL hands and L leg overnight; on assessment, L leg +1 edema. She states that her change in pain medication has helped with her pain, discussed continuing pain regimen today. Reports no issues with voiding; last BM 1/21. Denies any HA, dizziness/lightheadedness, fevers/chills, cough, congestion, rhinorrhea, sore throat, SOB, CP, palpitations, abdominal pain, n/v/d/c, melena, dysuria, urgency/frequency, hematuria, numbness/tingling, bruising/bleeding or rashes. Denies any sick contacts. ROS otherwise negative.       Objective     Physical Exam  Vitals reviewed.   Constitutional:       Appearance: Normal appearance.   HENT:      Head: Normocephalic and atraumatic.      Nose: Nose normal.      Mouth/Throat:      Mouth: Mucous membranes are moist.      Pharynx: Oropharynx is clear.   Eyes:      Extraocular Movements: Extraocular movements intact.      Pupils: Pupils are equal, round, and reactive to light.   Cardiovascular:      Rate and Rhythm: Normal rate and regular rhythm.      Pulses: Normal pulses.      Heart sounds: Normal heart sounds.   Pulmonary:      Effort: Pulmonary effort is normal.      Breath sounds: Normal breath sounds.   Abdominal:      General: Bowel sounds are normal.      Palpations: Abdomen is soft.   Musculoskeletal:         General: Normal range of motion.   Skin:     General: Skin is warm.   Neurological:      General: No focal deficit present.      Mental Status: She is alert and oriented to person, place, and time. Mental status is at baseline.   Psychiatric:         Mood and Affect: Mood normal.         Behavior: Behavior normal.     Last Recorded Vitals  Blood pressure 109/57, pulse 67, temperature 36.3 °C (97.3 °F), temperature source Temporal,  "resp. rate 14, height 1.655 m (5' 5.16\"), weight 97.1 kg (214 lb), SpO2 96%.  Intake/Output last 3 Shifts:  No intake/output data recorded.       This patient has a central line   Reason for the central line remaining today? Dialysis/Hemapheresis       Assessment/Plan   Assessment & Plan  Sickle cell anemia without crisis (Multi)    Senait Narvaez is a 54 y.o. 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 Apheresis line placed 1/20, s/p RBC exchange on 1/20. Pt reporting increase in sickle cell pain post-exchange located in BL legs and back; pain regimen switched to IV Dilaudid for pain control (1/21- ). DC home with resumed noc O2 pending pain improvement.     Updates 1/22:  - keep same pain regimen today (switched from rotating yesterday to only IV for increased pain post transfusion)  - INR 1.1 today, continue with Warfarin and Lovenox admin until INR in therapeutic range     # Hb SC disease   - currently on q6 week RBC exchange transfusions (most recent 12/13/24); planned for 1/20/25  - [facility] last updated 7/25/24  - OARRS reviewed, no aberrant behavior noted (last filled oxycodone 1/14 x12 days qty 75 tabs)  - Hemolysis labs are at baseline, pre-exchange labs obtained 1/17  - apheresis line placed in IR 1/20  - 1/17 Hg S 24.2% post exchange hgb S 16.7% (1/20)  - continue 2L night time oxygen   - c/w home folic acid 1 mg daily  - COVID/Flu neg 1/29  - (1/19-1/21) continue home oxycodone 10mg q4 PRN severe pain + i/s/o of acute on chronic pain started 1mg IVP " dilaudid q4 PRN breakthrough  - start IV Dilaudid 1mg Q2 PRN for severe pain (1/21- )  - bowel regimen for opioid induced constipation, zofran for opioid induced nausea, and benadryl for opioid induced pruritis      # L Breast Mass  - Initially noted a couple of months ago   - 10/16 Mammogram showing edema likely vaso-occlusive etiology, no evidence of malignancy   - in setting of no improvement since then and continues edema and pain, plan for repeat US   - 1/3/25 L breast US showed indeterminate L breast mass at 1 o'clock extending to subareolar region and indeterminate L breast mass at 11 o clock, further eval w/ surgical consultation and US guided bx recommended  - 1/17 Established with [facility], rec biopsy   - plan for US guided breast biopsy on 1/30     # DVT, PE  # SVC thrombus  - Warfarin held 1/19 for apheresis line, restarted post line placement (1/19)  - pt started on lovenox bridge post line placement - start lovenox 1mg/kg (100mg daily) 1/20 PM with resumed coumadin 5mg daily, dose until reach INR goal  - goal INR 2-3  - Daily coags     # SVT  # pHTN  - previously tachycardic to 178 on admit in Dec 2024 -asked to bear down and converted to NSR HR 90s   - c/w home metop 12.5 BID and Lasix 20 every other day   - 1/19 started on telemetry on admit       #DISPO  - Full code - confirmed on admit  - DC home resumed noc O2 post-exchange and pain controlled  - SURROGATE DECISION MAKER: Tati (mom) [phone]  - FUV 1/30 Breast bx, 2/13 Cardiology     I spent 60 minutes in the professional and overall care of this patient.    Assessment and plan as above discussed with attending physician Dr. Hensley.    Leanna Steven, APRN-CNP

## 2025-01-22 NOTE — CARE PLAN
The clinical goals for the shift include pt will remain HDS and VSS throughout shift 1/22/25 by 0700.      Problem: Pain - Adult  Goal: Verbalizes/displays adequate comfort level or baseline comfort level  Outcome: Progressing     Problem: Safety - Adult  Goal: Free from fall injury  Outcome: Progressing     Problem: Discharge Planning  Goal: Discharge to home or other facility with appropriate resources  Outcome: Progressing     Problem: Chronic Conditions and Co-morbidities  Goal: Patient's chronic conditions and co-morbidity symptoms are monitored and maintained or improved  Outcome: Progressing     Problem: Fall/Injury  Goal: Not fall by end of shift  Outcome: Progressing  Goal: Be free from injury by end of the shift  Outcome: Progressing  Goal: Verbalize understanding of personal risk factors for fall in the hospital  Outcome: Progressing     Problem: Pain  Goal: Takes deep breaths with improved pain control throughout the shift  Outcome: Progressing  Goal: Turns in bed with improved pain control throughout the shift  Outcome: Progressing  Goal: Walks with improved pain control throughout the shift  Outcome: Progressing  Goal: Performs ADL's with improved pain control throughout shift  Outcome: Progressing  Goal: Free from opioid side effects throughout the shift  Outcome: Progressing  Goal: Free from acute confusion related to pain meds throughout the shift  Outcome: Progressing

## 2025-01-22 NOTE — CARE PLAN
The clinical goals for the shift include pt will remain safe and free from injury throughout shift 1/22/2025 1900      Problem: Pain - Adult  Goal: Verbalizes/displays adequate comfort level or baseline comfort level  Outcome: Progressing     Problem: Safety - Adult  Goal: Free from fall injury  Outcome: Progressing     Problem: Discharge Planning  Goal: Discharge to home or other facility with appropriate resources  Outcome: Progressing     Problem: Chronic Conditions and Co-morbidities  Goal: Patient's chronic conditions and co-morbidity symptoms are monitored and maintained or improved  Outcome: Progressing     Problem: Fall/Injury  Goal: Not fall by end of shift  Outcome: Progressing  Goal: Be free from injury by end of the shift  Outcome: Progressing  Goal: Verbalize understanding of personal risk factors for fall in the hospital  Outcome: Progressing     Problem: Pain  Goal: Takes deep breaths with improved pain control throughout the shift  Outcome: Progressing  Goal: Turns in bed with improved pain control throughout the shift  Outcome: Progressing  Goal: Walks with improved pain control throughout the shift  Outcome: Progressing  Goal: Performs ADL's with improved pain control throughout shift  Outcome: Progressing  Goal: Free from opioid side effects throughout the shift  Outcome: Progressing  Goal: Free from acute confusion related to pain meds throughout the shift  Outcome: Progressing        The nasogastric tube (see size above) was inserted via the anatomic location.

## 2025-01-23 LAB
ALBUMIN SERPL BCP-MCNC: 3.4 G/DL (ref 3.4–5)
ALP SERPL-CCNC: 151 U/L (ref 33–110)
ALT SERPL W P-5'-P-CCNC: 16 U/L (ref 7–45)
ANION GAP SERPL CALC-SCNC: 10 MMOL/L (ref 10–20)
APTT PPP: 35 SECONDS (ref 27–38)
AST SERPL W P-5'-P-CCNC: 31 U/L (ref 9–39)
BASOPHILS # BLD MANUAL: 0 X10*3/UL (ref 0–0.1)
BASOPHILS # BLD MANUAL: 0.07 X10*3/UL (ref 0–0.1)
BASOPHILS NFR BLD MANUAL: 0 %
BASOPHILS NFR BLD MANUAL: 0.9 %
BILIRUB SERPL-MCNC: 1.4 MG/DL (ref 0–1.2)
BUN SERPL-MCNC: 15 MG/DL (ref 6–23)
CALCIUM SERPL-MCNC: 9.3 MG/DL (ref 8.6–10.6)
CHLORIDE SERPL-SCNC: 101 MMOL/L (ref 98–107)
CO2 SERPL-SCNC: 30 MMOL/L (ref 21–32)
CREAT SERPL-MCNC: 0.77 MG/DL (ref 0.5–1.05)
EGFRCR SERPLBLD CKD-EPI 2021: >90 ML/MIN/1.73M*2
EOSINOPHIL # BLD MANUAL: 0.24 X10*3/UL (ref 0–0.7)
EOSINOPHIL # BLD MANUAL: 0.39 X10*3/UL (ref 0–0.7)
EOSINOPHIL NFR BLD MANUAL: 3.4 %
EOSINOPHIL NFR BLD MANUAL: 5.4 %
ERYTHROCYTE [DISTWIDTH] IN BLOOD BY AUTOMATED COUNT: 23.7 % (ref 11.5–14.5)
ERYTHROCYTE [DISTWIDTH] IN BLOOD BY AUTOMATED COUNT: 23.7 % (ref 11.5–14.5)
GLUCOSE SERPL-MCNC: 74 MG/DL (ref 74–99)
HCT VFR BLD AUTO: 31.4 % (ref 36–46)
HCT VFR BLD AUTO: 32.9 % (ref 36–46)
HGB BLD-MCNC: 10.3 G/DL (ref 12–16)
HGB BLD-MCNC: 10.8 G/DL (ref 12–16)
HGB RETIC QN: 28 PG (ref 28–38)
HYPOCHROMIA BLD QL SMEAR: NORMAL
IMM GRANULOCYTES # BLD AUTO: 0.02 X10*3/UL (ref 0–0.7)
IMM GRANULOCYTES # BLD AUTO: 0.04 X10*3/UL (ref 0–0.7)
IMM GRANULOCYTES NFR BLD AUTO: 0.3 % (ref 0–0.9)
IMM GRANULOCYTES NFR BLD AUTO: 0.6 % (ref 0–0.9)
IMMATURE RETIC FRACTION: 20.8 %
INR PPP: 1.3 (ref 0.9–1.1)
LDH SERPL L TO P-CCNC: 233 U/L (ref 84–246)
LYMPHOCYTES # BLD MANUAL: 1.56 X10*3/UL (ref 1.2–4.8)
LYMPHOCYTES # BLD MANUAL: 1.71 X10*3/UL (ref 1.2–4.8)
LYMPHOCYTES NFR BLD MANUAL: 21.4 %
LYMPHOCYTES NFR BLD MANUAL: 24.1 %
MCH RBC QN AUTO: 26.6 PG (ref 26–34)
MCH RBC QN AUTO: 26.7 PG (ref 26–34)
MCHC RBC AUTO-ENTMCNC: 32.8 G/DL (ref 32–36)
MCHC RBC AUTO-ENTMCNC: 32.8 G/DL (ref 32–36)
MCV RBC AUTO: 81 FL (ref 80–100)
MCV RBC AUTO: 81 FL (ref 80–100)
MONOCYTES # BLD MANUAL: 0.18 X10*3/UL (ref 0.1–1)
MONOCYTES # BLD MANUAL: 0.78 X10*3/UL (ref 0.1–1)
MONOCYTES NFR BLD MANUAL: 10.7 %
MONOCYTES NFR BLD MANUAL: 2.6 %
NEUTS SEG # BLD MANUAL: 4.43 X10*3/UL (ref 1.2–7)
NEUTS SEG # BLD MANUAL: 4.9 X10*3/UL (ref 1.2–7)
NEUTS SEG NFR BLD MANUAL: 60.7 %
NEUTS SEG NFR BLD MANUAL: 69 %
NRBC BLD-RTO: 8.5 /100 WBCS (ref 0–0)
NRBC BLD-RTO: 8.5 /100 WBCS (ref 0–0)
PLATELET # BLD AUTO: 256 X10*3/UL (ref 150–450)
PLATELET # BLD AUTO: 268 X10*3/UL (ref 150–450)
POLYCHROMASIA BLD QL SMEAR: NORMAL
POLYCHROMASIA BLD QL SMEAR: NORMAL
POTASSIUM SERPL-SCNC: 4 MMOL/L (ref 3.5–5.3)
PROT SERPL-MCNC: 6.9 G/DL (ref 6.4–8.2)
PROTHROMBIN TIME: 14.7 SECONDS (ref 9.8–12.8)
RBC # BLD AUTO: 3.86 X10*6/UL (ref 4–5.2)
RBC # BLD AUTO: 4.06 X10*6/UL (ref 4–5.2)
RBC MORPH BLD: NORMAL
RBC MORPH BLD: NORMAL
RETICS #: 0.27 X10*6/UL (ref 0.02–0.08)
RETICS/RBC NFR AUTO: 6.5 % (ref 0.5–2)
SODIUM SERPL-SCNC: 137 MMOL/L (ref 136–145)
TARGETS BLD QL SMEAR: NORMAL
TARGETS BLD QL SMEAR: NORMAL
TOTAL CELLS COUNTED BLD: 112
TOTAL CELLS COUNTED BLD: 116
VARIANT LYMPHS # BLD MANUAL: 0.06 X10*3/UL (ref 0–0.5)
VARIANT LYMPHS # BLD MANUAL: 0.07 X10*3/UL (ref 0–0.5)
VARIANT LYMPHS NFR BLD: 0.9 %
VARIANT LYMPHS NFR BLD: 0.9 %
WBC # BLD AUTO: 7.1 X10*3/UL (ref 4.4–11.3)
WBC # BLD AUTO: 7.3 X10*3/UL (ref 4.4–11.3)

## 2025-01-23 PROCEDURE — 2500000004 HC RX 250 GENERAL PHARMACY W/ HCPCS (ALT 636 FOR OP/ED)

## 2025-01-23 PROCEDURE — 85007 BL SMEAR W/DIFF WBC COUNT: CPT

## 2025-01-23 PROCEDURE — 84075 ASSAY ALKALINE PHOSPHATASE: CPT

## 2025-01-23 PROCEDURE — 85027 COMPLETE CBC AUTOMATED: CPT

## 2025-01-23 PROCEDURE — 2500000001 HC RX 250 WO HCPCS SELF ADMINISTERED DRUGS (ALT 637 FOR MEDICARE OP): Performed by: NURSE PRACTITIONER

## 2025-01-23 PROCEDURE — 2500000001 HC RX 250 WO HCPCS SELF ADMINISTERED DRUGS (ALT 637 FOR MEDICARE OP)

## 2025-01-23 PROCEDURE — 1170000001 HC PRIVATE ONCOLOGY ROOM DAILY

## 2025-01-23 PROCEDURE — 99233 SBSQ HOSP IP/OBS HIGH 50: CPT

## 2025-01-23 PROCEDURE — 2500000004 HC RX 250 GENERAL PHARMACY W/ HCPCS (ALT 636 FOR OP/ED): Performed by: NURSE PRACTITIONER

## 2025-01-23 PROCEDURE — 2500000002 HC RX 250 W HCPCS SELF ADMINISTERED DRUGS (ALT 637 FOR MEDICARE OP, ALT 636 FOR OP/ED): Performed by: NURSE PRACTITIONER

## 2025-01-23 PROCEDURE — 36415 COLL VENOUS BLD VENIPUNCTURE: CPT

## 2025-01-23 PROCEDURE — 83615 LACTATE (LD) (LDH) ENZYME: CPT

## 2025-01-23 PROCEDURE — 85045 AUTOMATED RETICULOCYTE COUNT: CPT

## 2025-01-23 PROCEDURE — 85610 PROTHROMBIN TIME: CPT

## 2025-01-23 RX ORDER — HYDROMORPHONE HYDROCHLORIDE 1 MG/ML
1 INJECTION, SOLUTION INTRAMUSCULAR; INTRAVENOUS; SUBCUTANEOUS EVERY 4 HOURS PRN
Status: DISCONTINUED | OUTPATIENT
Start: 2025-01-23 | End: 2025-01-24 | Stop reason: HOSPADM

## 2025-01-23 RX ADMIN — OXYCODONE HYDROCHLORIDE 15 MG: 10 TABLET ORAL at 13:58

## 2025-01-23 RX ADMIN — OXYCODONE HYDROCHLORIDE 15 MG: 10 TABLET ORAL at 18:05

## 2025-01-23 RX ADMIN — HYDROMORPHONE HYDROCHLORIDE 1 MG: 1 INJECTION, SOLUTION INTRAMUSCULAR; INTRAVENOUS; SUBCUTANEOUS at 00:46

## 2025-01-23 RX ADMIN — WARFARIN SODIUM 5 MG: 5 TABLET ORAL at 18:05

## 2025-01-23 RX ADMIN — HYDROMORPHONE HYDROCHLORIDE 1 MG: 1 INJECTION, SOLUTION INTRAMUSCULAR; INTRAVENOUS; SUBCUTANEOUS at 16:03

## 2025-01-23 RX ADMIN — FOLIC ACID 1 MG: 1 TABLET ORAL at 08:14

## 2025-01-23 RX ADMIN — HYDROMORPHONE HYDROCHLORIDE 1 MG: 1 INJECTION, SOLUTION INTRAMUSCULAR; INTRAVENOUS; SUBCUTANEOUS at 08:13

## 2025-01-23 RX ADMIN — CETIRIZINE HYDROCHLORIDE 10 MG: 10 TABLET, FILM COATED ORAL at 08:13

## 2025-01-23 RX ADMIN — HYDROMORPHONE HYDROCHLORIDE 1 MG: 1 INJECTION, SOLUTION INTRAMUSCULAR; INTRAVENOUS; SUBCUTANEOUS at 05:23

## 2025-01-23 RX ADMIN — HYDROMORPHONE HYDROCHLORIDE 1 MG: 1 INJECTION, SOLUTION INTRAMUSCULAR; INTRAVENOUS; SUBCUTANEOUS at 03:13

## 2025-01-23 RX ADMIN — HYDROMORPHONE HYDROCHLORIDE 1 MG: 1 INJECTION, SOLUTION INTRAMUSCULAR; INTRAVENOUS; SUBCUTANEOUS at 10:20

## 2025-01-23 RX ADMIN — HYDROMORPHONE HYDROCHLORIDE 1 MG: 1 INJECTION, SOLUTION INTRAMUSCULAR; INTRAVENOUS; SUBCUTANEOUS at 20:19

## 2025-01-23 RX ADMIN — ENOXAPARIN SODIUM 100 MG: 100 INJECTION SUBCUTANEOUS at 18:05

## 2025-01-23 RX ADMIN — METOPROLOL TARTRATE 25 MG: 25 TABLET, FILM COATED ORAL at 08:13

## 2025-01-23 RX ADMIN — OXYCODONE HYDROCHLORIDE 15 MG: 10 TABLET ORAL at 22:15

## 2025-01-23 RX ADMIN — DIPHENHYDRAMINE HYDROCHLORIDE 25 MG: 25 CAPSULE ORAL at 16:06

## 2025-01-23 RX ADMIN — METOPROLOL TARTRATE 25 MG: 25 TABLET, FILM COATED ORAL at 20:19

## 2025-01-23 ASSESSMENT — COGNITIVE AND FUNCTIONAL STATUS - GENERAL
MOBILITY SCORE: 24
DAILY ACTIVITIY SCORE: 24
MOBILITY SCORE: 24
DAILY ACTIVITIY SCORE: 24

## 2025-01-23 ASSESSMENT — PAIN - FUNCTIONAL ASSESSMENT
PAIN_FUNCTIONAL_ASSESSMENT: 0-10
PAIN_FUNCTIONAL_ASSESSMENT: UNABLE TO SELF-REPORT
PAIN_FUNCTIONAL_ASSESSMENT: 0-10
PAIN_FUNCTIONAL_ASSESSMENT: 0-10

## 2025-01-23 ASSESSMENT — PAIN SCALES - GENERAL
PAINLEVEL_OUTOF10: 8
PAINLEVEL_OUTOF10: 7
PAINLEVEL_OUTOF10: 6
PAINLEVEL_OUTOF10: 6
PAINLEVEL_OUTOF10: 7
PAINLEVEL_OUTOF10: 8
PAINLEVEL_OUTOF10: 9
PAINLEVEL_OUTOF10: 8
PAINLEVEL_OUTOF10: 7
PAINLEVEL_OUTOF10: 8
PAINLEVEL_OUTOF10: 7
PAINLEVEL_OUTOF10: 6
PAINLEVEL_OUTOF10: 6
PAINLEVEL_OUTOF10: 7
PAINLEVEL_OUTOF10: 8
PAINLEVEL_OUTOF10: 7

## 2025-01-23 NOTE — CARE PLAN
The clinical goals for the shift include pt will remain safe and free from injury throughout shift 1/22/2025 1900      Problem: Pain - Adult  Goal: Verbalizes/displays adequate comfort level or baseline comfort level  Outcome: Progressing     Problem: Safety - Adult  Goal: Free from fall injury  Outcome: Progressing     Problem: Discharge Planning  Goal: Discharge to home or other facility with appropriate resources  Outcome: Progressing     Problem: Chronic Conditions and Co-morbidities  Goal: Patient's chronic conditions and co-morbidity symptoms are monitored and maintained or improved  Outcome: Progressing     Problem: Fall/Injury  Goal: Not fall by end of shift  Outcome: Progressing  Goal: Be free from injury by end of the shift  Outcome: Progressing  Goal: Verbalize understanding of personal risk factors for fall in the hospital  Outcome: Progressing     Problem: Pain  Goal: Takes deep breaths with improved pain control throughout the shift  Outcome: Progressing  Goal: Turns in bed with improved pain control throughout the shift  Outcome: Progressing  Goal: Walks with improved pain control throughout the shift  Outcome: Progressing  Goal: Performs ADL's with improved pain control throughout shift  Outcome: Progressing  Goal: Free from opioid side effects throughout the shift  Outcome: Progressing  Goal: Free from acute confusion related to pain meds throughout the shift  Outcome: Progressing

## 2025-01-23 NOTE — CARE PLAN
The clinical goals for the shift include pt will remain safe and free from injury throughout shift 1/23/2025 1900      Problem: Pain - Adult  Goal: Verbalizes/displays adequate comfort level or baseline comfort level  Outcome: Progressing     Problem: Safety - Adult  Goal: Free from fall injury  Outcome: Progressing     Problem: Discharge Planning  Goal: Discharge to home or other facility with appropriate resources  Outcome: Progressing     Problem: Chronic Conditions and Co-morbidities  Goal: Patient's chronic conditions and co-morbidity symptoms are monitored and maintained or improved  Outcome: Progressing     Problem: Fall/Injury  Goal: Not fall by end of shift  Outcome: Progressing  Goal: Be free from injury by end of the shift  Outcome: Progressing  Goal: Verbalize understanding of personal risk factors for fall in the hospital  Outcome: Progressing     Problem: Pain  Goal: Takes deep breaths with improved pain control throughout the shift  Outcome: Progressing  Goal: Turns in bed with improved pain control throughout the shift  Outcome: Progressing  Goal: Walks with improved pain control throughout the shift  Outcome: Progressing  Goal: Performs ADL's with improved pain control throughout shift  Outcome: Progressing  Goal: Free from opioid side effects throughout the shift  Outcome: Progressing  Goal: Free from acute confusion related to pain meds throughout the shift  Outcome: Progressing

## 2025-01-23 NOTE — PROGRESS NOTES
"Senait Narvaez is a 54 y.o. female on day 4 of admission presenting with Sickle cell anemia with pain (Multi).    Subjective   Seen this AM at bedside. Pt still reports pain 8/10 pain, located in BL legs and back. She is also endorsing that her right leg is swollen. Discussed starting to rotate PO medication with IV today; pt agreeable. Denies issues with voiding, last BM 1/22. Denies any HA, dizziness/lightheadedness, fevers/chills, cough, congestion, rhinorrhea, sore throat, SOB, CP, palpitations, abdominal pain, n/v/d/c, melena, dysuria, urgency/frequency, hematuria, numbness/tingling, bruising/bleeding or rashes. Denies any sick contacts. ROS otherwise negative.     Objective     Physical Exam  Vitals reviewed.   Constitutional:       Appearance: Normal appearance.   HENT:      Head: Normocephalic and atraumatic.      Nose: Nose normal.      Mouth/Throat:      Mouth: Mucous membranes are moist.      Pharynx: Oropharynx is clear.   Eyes:      Extraocular Movements: Extraocular movements intact.      Pupils: Pupils are equal, round, and reactive to light.   Cardiovascular:      Rate and Rhythm: Normal rate and regular rhythm.      Pulses: Normal pulses.      Heart sounds: Normal heart sounds.   Pulmonary:      Effort: Pulmonary effort is normal.      Breath sounds: Normal breath sounds.   Abdominal:      General: Bowel sounds are normal.      Palpations: Abdomen is soft.   Musculoskeletal:         General: Normal range of motion.   Skin:     General: Skin is warm.   Neurological:      General: No focal deficit present.      Mental Status: She is alert and oriented to person, place, and time. Mental status is at baseline.   Psychiatric:         Mood and Affect: Mood normal.         Behavior: Behavior normal.   Last Recorded Vitals  Blood pressure 108/73, pulse 65, temperature 36.7 °C (98.1 °F), temperature source Temporal, resp. rate 18, height 1.655 m (5' 5.16\"), weight 97.1 kg (214 lb), SpO2 94%.  Intake/Output last " 3 Shifts:  I/O last 3 completed shifts:  In: 500 (5.2 mL/kg) [P.O.:500]  Out: 0 (0 mL/kg)   Weight: 97.1 kg       Assessment/Plan   Assessment & Plan  Sickle cell anemia without crisis (Multi)    Senait Narvaez is a 54 y.o. 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 Apheresis line placed 1/20, s/p RBC exchange on 1/20. Pt reporting increase in sickle cell pain post-exchange located in BL legs and back; pain regimen switched to IV Dilaudid for pain control (1/21-1/23) and then transitioned back to rotating PO with IV pain medication. DC home with resumed noc O2 pending pain improvement, possibly tomorrow 1/23.    Updates 1/23:  - starting rotating PO oxycodone 15mg Q4 PRN with IV Dilaudid 1mg Q4 PRN  - INR 1.4 today, continue with Warfarin and Lovenox admin until INR in therapeutic range  - apheresis line pulled     # Hb SC disease   - currently on q6 week RBC exchange transfusions (most recent 12/13/24); planned for 1/20/25  - [facility] last updated 7/25/24  - OARRS reviewed, no aberrant behavior noted (last filled oxycodone 1/14 x12 days qty 75 tabs)  - Hemolysis labs are at baseline, pre-exchange labs obtained 1/17  - apheresis line placed in IR 1/20  - 1/17 Hg S 24.2% post exchange hgb S 16.7% (1/20)  - continue 2L night time oxygen   - c/w home folic acid 1 mg daily  - COVID/Flu neg 1/29  - (1/19-1/21) continue home oxycodone 10mg q4 PRN severe pain + i/s/o of acute on chronic pain started 1mg IVP dilaudid q4 PRN breakthrough  - s/p IV Dilaudid 1mg Q2 PRN  for severe pain (1/21-1/23)  - start rotating PO oxycodone 15mg Q4 PRN with IV Dilaudid 1mg Q4 PRN for severe pain  - bowel regimen for opioid induced constipation, zofran for opioid induced nausea, and benadryl for opioid induced pruritis      # L Breast Mass  - Initially noted a couple of months ago   - 10/16 Mammogram showing edema likely vaso-occlusive etiology, no evidence of malignancy   - in setting of no improvement since then and continues edema and pain, plan for repeat US   - 1/3/25 L breast US showed indeterminate L breast mass at 1 o'clock extending to subareolar region and indeterminate L breast mass at 11 o clock, further eval w/ surgical consultation and US guided bx recommended  - 1/17 [facility], rec biopsy   - plan for US guided breast biopsy on 1/30     # DVT, PE  # SVC thrombus  - Warfarin held 1/19 for apheresis line, restarted post line placement (1/19)  - pt started on lovenox bridge post line placement - start lovenox 1mg/kg (100mg daily) 1/20 PM with resumed coumadin 5mg daily, dose until reach INR goal  - goal INR 2-3  - Daily coags     # SVT  # pHTN  - previously tachycardic to 178 on admit in Dec 2024 -asked to bear down and converted to NSR HR 90s   - c/w home metop 12.5 BID and Lasix 20 every other day   - 1/19 started on telemetry on admit       #DISPO  - Full code - confirmed on admit  - DC home resumed noc O2 post-exchange and pain controlled  - SURROGATE DECISION MAKER[place] [phone]  - FUV 1/30 Breast bx, 2/13 Cardiology, sickle cell FUV requested     I spent 60 minutes in the professional and overall care of this patient.    Assessment and plan as above discussed with attending physician Dr. Hensley.    Leanna Steven, GRISEL-CNP

## 2025-01-23 NOTE — NURSING NOTE
"1/23/2025 @1100    CTA found this RN and stated \"the pt said she's bleeding really bad\". TOMAS Trujillo had pulled right femoral line approximately one hour before and held pressure for about 30 mins. RN entered room to pt standing in the bathroom with blood saturated over sheets, floor, bathroom, and pt. Pt assisted back into bed by this RN and additional staff presented into room. Pressure was held on right femoral site while TOMAS Trujillo was called to bedside. CNP arrived and continued to hold pressure while this RN assisted in cleaning pt. Site stopped bleeding and was redressed with 4x4 gauze, abd pad, and pressure tape. Pt instructed to not get out of bed for approximately one hour ([id number]) and she verbalized understanding. VSS see flowsheets, pt states she \"feels perfectly fine!\".     ZIGGY Eaton RN  "

## 2025-01-24 ENCOUNTER — PHARMACY VISIT (OUTPATIENT)
Dept: PHARMACY | Facility: CLINIC | Age: 55
End: 2025-01-24
Payer: COMMERCIAL

## 2025-01-24 ENCOUNTER — ANTICOAGULATION - WARFARIN VISIT (OUTPATIENT)
Dept: CARDIOLOGY | Facility: CLINIC | Age: 55
End: 2025-01-24
Payer: COMMERCIAL

## 2025-01-24 VITALS
TEMPERATURE: 97.3 F | HEART RATE: 57 BPM | BODY MASS INDEX: 35.65 KG/M2 | DIASTOLIC BLOOD PRESSURE: 66 MMHG | OXYGEN SATURATION: 95 % | RESPIRATION RATE: 18 BRPM | WEIGHT: 214 LBS | SYSTOLIC BLOOD PRESSURE: 108 MMHG | HEIGHT: 65 IN

## 2025-01-24 DIAGNOSIS — I82.210 THROMBOSIS OF SUPERIOR VENA CAVA (MULTI): ICD-10-CM

## 2025-01-24 DIAGNOSIS — I26.99 RECURRENT PULMONARY EMBOLISM (MULTI): Primary | ICD-10-CM

## 2025-01-24 DIAGNOSIS — I82.4Z9 DEEP VEIN THROMBOSIS (DVT) OF DISTAL VEIN OF LOWER EXTREMITY, UNSPECIFIED CHRONICITY, UNSPECIFIED LATERALITY (MULTI): ICD-10-CM

## 2025-01-24 PROBLEM — D57.1 SICKLE CELL ANEMIA WITHOUT CRISIS (MULTI): Status: RESOLVED | Noted: 2023-10-26 | Resolved: 2025-01-24

## 2025-01-24 LAB
ALBUMIN SERPL BCP-MCNC: 3.5 G/DL (ref 3.4–5)
ALP SERPL-CCNC: 156 U/L (ref 33–110)
ALT SERPL W P-5'-P-CCNC: 19 U/L (ref 7–45)
ANION GAP SERPL CALC-SCNC: 12 MMOL/L (ref 10–20)
APTT PPP: 40 SECONDS (ref 27–38)
AST SERPL W P-5'-P-CCNC: 32 U/L (ref 9–39)
BASOPHILS # BLD MANUAL: 0 X10*3/UL (ref 0–0.1)
BASOPHILS NFR BLD MANUAL: 0 %
BILIRUB SERPL-MCNC: 1.3 MG/DL (ref 0–1.2)
BUN SERPL-MCNC: 15 MG/DL (ref 6–23)
CALCIUM SERPL-MCNC: 9.2 MG/DL (ref 8.6–10.6)
CHLORIDE SERPL-SCNC: 99 MMOL/L (ref 98–107)
CO2 SERPL-SCNC: 28 MMOL/L (ref 21–32)
CREAT SERPL-MCNC: 0.85 MG/DL (ref 0.5–1.05)
EGFRCR SERPLBLD CKD-EPI 2021: 81 ML/MIN/1.73M*2
EOSINOPHIL # BLD MANUAL: 0.13 X10*3/UL (ref 0–0.7)
EOSINOPHIL NFR BLD MANUAL: 1.7 %
ERYTHROCYTE [DISTWIDTH] IN BLOOD BY AUTOMATED COUNT: 24.1 % (ref 11.5–14.5)
GLUCOSE SERPL-MCNC: 94 MG/DL (ref 74–99)
HCT VFR BLD AUTO: 31.2 % (ref 36–46)
HGB BLD-MCNC: 10.2 G/DL (ref 12–16)
HGB RETIC QN: 27 PG (ref 28–38)
HYPOCHROMIA BLD QL SMEAR: NORMAL
IMM GRANULOCYTES # BLD AUTO: 0.03 X10*3/UL (ref 0–0.7)
IMM GRANULOCYTES NFR BLD AUTO: 0.4 % (ref 0–0.9)
IMMATURE RETIC FRACTION: 16.4 %
INR PPP: 1.5 (ref 0.9–1.1)
LDH SERPL L TO P-CCNC: 213 U/L (ref 84–246)
LYMPHOCYTES # BLD MANUAL: 1.73 X10*3/UL (ref 1.2–4.8)
LYMPHOCYTES NFR BLD MANUAL: 21.9 %
MCH RBC QN AUTO: 27 PG (ref 26–34)
MCHC RBC AUTO-ENTMCNC: 32.7 G/DL (ref 32–36)
MCV RBC AUTO: 83 FL (ref 80–100)
MONOCYTES # BLD MANUAL: 0.7 X10*3/UL (ref 0.1–1)
MONOCYTES NFR BLD MANUAL: 8.8 %
NEUTS SEG # BLD MANUAL: 5.27 X10*3/UL (ref 1.2–7)
NEUTS SEG NFR BLD MANUAL: 66.7 %
NRBC BLD-RTO: 6.6 /100 WBCS (ref 0–0)
PLATELET # BLD AUTO: 276 X10*3/UL (ref 150–450)
POLYCHROMASIA BLD QL SMEAR: NORMAL
POTASSIUM SERPL-SCNC: 3.8 MMOL/L (ref 3.5–5.3)
PROT SERPL-MCNC: 7.2 G/DL (ref 6.4–8.2)
PROTHROMBIN TIME: 16.7 SECONDS (ref 9.8–12.8)
RBC # BLD AUTO: 3.78 X10*6/UL (ref 4–5.2)
RBC MORPH BLD: NORMAL
RETICS #: 0.26 X10*6/UL (ref 0.02–0.08)
RETICS/RBC NFR AUTO: 6.9 % (ref 0.5–2)
SODIUM SERPL-SCNC: 135 MMOL/L (ref 136–145)
TARGETS BLD QL SMEAR: NORMAL
TOTAL CELLS COUNTED BLD: 114
VARIANT LYMPHS # BLD MANUAL: 0.07 X10*3/UL (ref 0–0.5)
VARIANT LYMPHS NFR BLD: 0.9 %
WBC # BLD AUTO: 7.9 X10*3/UL (ref 4.4–11.3)

## 2025-01-24 PROCEDURE — 2500000001 HC RX 250 WO HCPCS SELF ADMINISTERED DRUGS (ALT 637 FOR MEDICARE OP)

## 2025-01-24 PROCEDURE — 80053 COMPREHEN METABOLIC PANEL: CPT

## 2025-01-24 PROCEDURE — 85027 COMPLETE CBC AUTOMATED: CPT

## 2025-01-24 PROCEDURE — 2500000004 HC RX 250 GENERAL PHARMACY W/ HCPCS (ALT 636 FOR OP/ED)

## 2025-01-24 PROCEDURE — 2500000001 HC RX 250 WO HCPCS SELF ADMINISTERED DRUGS (ALT 637 FOR MEDICARE OP): Performed by: NURSE PRACTITIONER

## 2025-01-24 PROCEDURE — 85610 PROTHROMBIN TIME: CPT

## 2025-01-24 PROCEDURE — 85045 AUTOMATED RETICULOCYTE COUNT: CPT

## 2025-01-24 PROCEDURE — RXMED WILLOW AMBULATORY MEDICATION CHARGE

## 2025-01-24 PROCEDURE — 85007 BL SMEAR W/DIFF WBC COUNT: CPT

## 2025-01-24 PROCEDURE — 36415 COLL VENOUS BLD VENIPUNCTURE: CPT

## 2025-01-24 PROCEDURE — 99239 HOSP IP/OBS DSCHRG MGMT >30: CPT

## 2025-01-24 PROCEDURE — 83615 LACTATE (LD) (LDH) ENZYME: CPT

## 2025-01-24 RX ORDER — HYDROMORPHONE HYDROCHLORIDE 1 MG/ML
1 INJECTION, SOLUTION INTRAMUSCULAR; INTRAVENOUS; SUBCUTANEOUS ONCE
Status: COMPLETED | OUTPATIENT
Start: 2025-01-24 | End: 2025-01-24

## 2025-01-24 RX ORDER — ENOXAPARIN SODIUM 100 MG/ML
1 INJECTION SUBCUTANEOUS ONCE
Qty: 1 ML | Refills: 0 | Status: SHIPPED | OUTPATIENT
Start: 2025-01-25 | End: 2025-01-25

## 2025-01-24 RX ADMIN — FUROSEMIDE 20 MG: 20 TABLET ORAL at 08:49

## 2025-01-24 RX ADMIN — OXYCODONE HYDROCHLORIDE 15 MG: 10 TABLET ORAL at 08:49

## 2025-01-24 RX ADMIN — METOPROLOL TARTRATE 25 MG: 25 TABLET, FILM COATED ORAL at 08:49

## 2025-01-24 RX ADMIN — CETIRIZINE HYDROCHLORIDE 10 MG: 10 TABLET, FILM COATED ORAL at 08:49

## 2025-01-24 RX ADMIN — HYDROMORPHONE HYDROCHLORIDE 1 MG: 1 INJECTION, SOLUTION INTRAMUSCULAR; INTRAVENOUS; SUBCUTANEOUS at 14:15

## 2025-01-24 RX ADMIN — HYDROMORPHONE HYDROCHLORIDE 1 MG: 1 INJECTION, SOLUTION INTRAMUSCULAR; INTRAVENOUS; SUBCUTANEOUS at 06:31

## 2025-01-24 RX ADMIN — OXYCODONE HYDROCHLORIDE 15 MG: 10 TABLET ORAL at 12:56

## 2025-01-24 RX ADMIN — HYDROMORPHONE HYDROCHLORIDE 1 MG: 1 INJECTION, SOLUTION INTRAMUSCULAR; INTRAVENOUS; SUBCUTANEOUS at 10:30

## 2025-01-24 RX ADMIN — OXYCODONE HYDROCHLORIDE 15 MG: 10 TABLET ORAL at 04:25

## 2025-01-24 RX ADMIN — HYDROMORPHONE HYDROCHLORIDE 1 MG: 1 INJECTION, SOLUTION INTRAMUSCULAR; INTRAVENOUS; SUBCUTANEOUS at 00:45

## 2025-01-24 RX ADMIN — FOLIC ACID 1 MG: 1 TABLET ORAL at 08:49

## 2025-01-24 ASSESSMENT — PAIN SCALES - GENERAL
PAINLEVEL_OUTOF10: 8
PAINLEVEL_OUTOF10: 8
PAINLEVEL_OUTOF10: 5 - MODERATE PAIN
PAINLEVEL_OUTOF10: 7
PAINLEVEL_OUTOF10: 8
PAINLEVEL_OUTOF10: 6
PAINLEVEL_OUTOF10: 8
PAINLEVEL_OUTOF10: 6
PAINLEVEL_OUTOF10: 8
PAINLEVEL_OUTOF10: 8
PAINLEVEL_OUTOF10: 6
PAINLEVEL_OUTOF10: 8
PAINLEVEL_OUTOF10: 6
PAINLEVEL_OUTOF10: 6
PAINLEVEL_OUTOF10: 8
PAINLEVEL_OUTOF10: 8

## 2025-01-24 NOTE — DISCHARGE SUMMARY
Discharge Diagnosis  Sickle cell anemia with pain (Multi)    Issues Requiring Follow-Up  Sickle cell disease with routine exchanges     Test Results Pending At Discharge  Pending Labs       Order Current Status    Comprehensive Metabolic Panel In process    Lactate Dehydrogenase In process            Hospital Course   Senait Narvaez is a 54 y.o. 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 Apheresis line placed 1/20, s/p RBC exchange on 1/20. Pt reporting increase in sickle cell pain post-exchange located in BL legs and back; pain regimen switched to IV Dilaudid for pain control (1/21-1/23) and then transitioned back to rotating PO with IV pain medication. Patient began lovenox bringe back to warfarin and will get last dose of outpatient lovenox for bridge 1/25. Patient to follow up with breast ultrasound and biopsy 1/30, cardiology 2/13, and sickle cell team 3/10.   On the day of discharge, the patient reported feeling well and pain was controlled. Vitals and labs were stable. On exam:  Constitutional: Awake, NAD  ENMT: mucous membranes moist, no apparent injury, no lesions seen  Head/Neck: NCAT  Respiratory/Thorax: CTAB, no wheezing  Cardiovascular: RRR, S1+S2, no murmur  Gastrointestinal: Soft, NT, nondistended, +BS  Extremities: No LE edema  Psychological: Appropriate mood and behavior  Skin: no rash noted  Attending has reviewed all labs and vitals, and discussed and agreed with the discharge plan prior to patient discharge.  Patient discharged in stable  condition. > 30 minutes spent on discharge planning      Pertinent Physical Exam At Time of Discharge  Physical Exam  HENT:      Head: Normocephalic.      Nose: Nose normal.      Mouth/Throat:      Mouth: Mucous membranes are moist.   Eyes:      Pupils: Pupils are equal, round, and reactive to light.   Cardiovascular:      Rate and Rhythm: Normal rate.   Pulmonary:      Effort: Pulmonary effort is normal.   Abdominal:      Palpations: Abdomen is soft.   Musculoskeletal:         General: Normal range of motion.      Cervical back: Normal range of motion.   Skin:     General: Skin is warm.      Capillary Refill: Capillary refill takes less than 2 seconds.   Neurological:      General: No focal deficit present.      Mental Status: She is alert.   Psychiatric:         Mood and Affect: Mood normal.         Behavior: Behavior normal.         Home Medications     Medication List      START taking these medications     enoxaparin 100 mg/mL syringe; Commonly known as: Lovenox; Inject 1 mL   (100 mg) under the skin 1 time for 1 dose. Do not start before January 25, 2025.; Start taking on: January 25, 2025     CONTINUE taking these medications     albuterol 90 mcg/actuation inhaler; Inhale 2 puffs every 6 hours if   needed for wheezing.   ascorbic acid 500 mg chewable tablet; Commonly known as: Vitamin C   cyclobenzaprine 10 mg tablet; Commonly known as: Flexeril; Take 1 tablet   (10 mg) by mouth 3 times a day as needed for muscle spasms.   fluticasone 50 mcg/actuation nasal spray; Commonly known as: Flonase   folic acid 1 mg tablet; Commonly known as: Folvite   furosemide 20 mg tablet; Commonly known as: Lasix; Take 1 tablet (20 mg)   by mouth every other day.   loratadine 10 mg tablet; Commonly known as: Claritin   metoprolol tartrate 25 mg tablet; Commonly known as: Lopressor   multivitamin tablet   naloxone 4 mg/0.1 mL nasal spray; Commonly known as: Narcan; Administer   1 spray (4 mg) into affected nostril(s) if needed  for opioid reversal. May   repeat every 2-3 minutes if needed, alternating nostrils, until medical   assistance becomes available.   oxyCODONE 10 mg immediate release tablet; Commonly known as: Roxicodone;   Take 1 tablet (10 mg) by mouth every 4 hours if needed for severe pain (7   - 10) (pain).   oxygen gas therapy; Commonly known as: O2; Inhale 1 each continuously.   traZODone 50 mg tablet; Commonly known as: Desyrel; Take 1 tablet (50   mg) by mouth as needed at bedtime for sleep.   triamcinolone 0.1 % cream; Commonly known as: Kenalog; Apply topically 2   times a day. Apply triamcinolone 0.1% cream to both the black plaques as   well as the red areas across the trunk and thighs   warfarin 5 mg tablet; Commonly known as: Coumadin; Take as directed. If   you are unsure how to take this medication, talk to your nurse or doctor.;   Original instructions: Take 1 tablet (5 mg) by mouth once daily in the   evening.   white petrolatum 41 % ointment ointment; Commonly known as: Aquaphor;   Apply 2 Applications topically 2 times a day. Apply vaseline to all   eroded/denuded areas. Mepilex transfer sheets may be used for areas of   friction       Outpatient Follow-Up  Future Appointments   Date Time Provider Department Center   1/30/2025 10:00 AM [facility] [place] 2 [facility]   1/30/2025 10:00 AM [facility] [place] [facility]   1/30/2025 10:00 AM [facility] [place]TRASOUND 1 [facility]   1/30/2025 10:00 AM [facility] [place]TRASOUND 2 [facility]   1/30/2025 10:30 AM [facility] [place]TRASOUND 2 [facility]   1/30/2025 11:00 AM [facility] [place]TRASOUND 2 [facility]   2/13/2025  8:40 AM DO [id number]   3/10/2025 10:30 AM Erich Whaley MD [id number]       Rach Saleh, APRN-CNP

## 2025-01-24 NOTE — PROGRESS NOTES
Patient states she just got discharged and was not home yet.  Patient stated she was there for her blood transfusion she receives ever 6 weeks.  Patient agreed to test on 1/27.

## 2025-01-24 NOTE — CARE PLAN
The clinical goals for the shift include Pt will remain HDS and VSS throughout shift 1/24/25 by 0700.      Problem: Pain - Adult  Goal: Verbalizes/displays adequate comfort level or baseline comfort level  Outcome: Progressing     Problem: Safety - Adult  Goal: Free from fall injury  Outcome: Progressing     Problem: Discharge Planning  Goal: Discharge to home or other facility with appropriate resources  Outcome: Progressing     Problem: Chronic Conditions and Co-morbidities  Goal: Patient's chronic conditions and co-morbidity symptoms are monitored and maintained or improved  Outcome: Progressing     Problem: Fall/Injury  Goal: Not fall by end of shift  Outcome: Progressing  Goal: Be free from injury by end of the shift  Outcome: Progressing  Goal: Verbalize understanding of personal risk factors for fall in the hospital  Outcome: Progressing     Problem: Pain  Goal: Takes deep breaths with improved pain control throughout the shift  Outcome: Progressing  Goal: Turns in bed with improved pain control throughout the shift  Outcome: Progressing  Goal: Walks with improved pain control throughout the shift  Outcome: Progressing  Goal: Performs ADL's with improved pain control throughout shift  Outcome: Progressing  Goal: Free from opioid side effects throughout the shift  Outcome: Progressing  Goal: Free from acute confusion related to pain meds throughout the shift  Outcome: Progressing

## 2025-01-27 ENCOUNTER — ANTICOAGULATION - WARFARIN VISIT (OUTPATIENT)
Dept: CARDIOLOGY | Facility: CLINIC | Age: 55
End: 2025-01-27
Payer: COMMERCIAL

## 2025-01-27 DIAGNOSIS — I26.99 RECURRENT PULMONARY EMBOLISM (MULTI): Primary | ICD-10-CM

## 2025-01-27 DIAGNOSIS — I82.4Z9 DEEP VEIN THROMBOSIS (DVT) OF DISTAL VEIN OF LOWER EXTREMITY, UNSPECIFIED CHRONICITY, UNSPECIFIED LATERALITY (MULTI): ICD-10-CM

## 2025-01-27 DIAGNOSIS — I82.210 THROMBOSIS OF SUPERIOR VENA CAVA (MULTI): ICD-10-CM

## 2025-01-27 LAB
INR IN PPP BY COAGULATION ASSAY EXTERNAL: 2.5
PROTHROMBIN TIME (PT) IN PPP BY COAGULATION ASSAY EXTERNAL: NORMAL

## 2025-01-27 NOTE — PROGRESS NOTES
Patient identification verified with 2 identifiers.    Location: [facility] Patient Self-Testing Program [phone]    Referring Physician: DR. TRAN  Enrollment/ Re-enrollment date: 25   INR Goal: 2.0-3.0  INR monitoring is per [facility] protocol.  Anticoagulation Medication: warfarin  Indication: Pulmonary Embolism (PE)    Subjective   Bleeding signs/symptoms: No    Bruising: No   Major bleeding event: No  Thrombosis signs/symptoms: No  Thromboembolic event: No  Missed doses: No  Extra doses: No  Medication changes: No  Dietary changes: No  Change in health: No  Change in activity: No  Alcohol: No  Other concerns: No    Upcoming Procedures:  Does the Patient Have any upcoming procedures that require interruption in anticoagulation therapy? yes  Does the patient require bridging? no PT STATES THAT DR. TRAN AWARE OF STOPPAGE OF WARFARIN AND DID NOT ORDER BRIDGING.      Anticoagulation Summary  As of 2025      INR goal:  2.0-3.0   TTR:  84.5% (10.5 mo)   INR used for dosin.50 (2025)   Weekly warfarin total:  35 mg               Assessment/Plan   Therapeutic     1. New dose: no change    2. Next INR: 1 week    I SPOKE TO PT CONFIRMING CURRENT WARFARIN DOSING.   PT WILL RESTART CURRENT  WEEKLY DOSE SCHEDULE AFTER 25 BREAST BIOPSY.   PT VERBALIZED UNDERSTANDING OF THESE DOSING INSTRUCTIONS.   Education provided to patient during the visit:  Patient instructed to call in interim with questions, concerns and changes.   Patient educated on signs of bleeding/clotting.

## 2025-01-28 DIAGNOSIS — I82.210 THROMBOSIS OF SUPERIOR VENA CAVA (MULTI): ICD-10-CM

## 2025-01-28 DIAGNOSIS — D57.00 SICKLE CELL CRISIS (MULTI): ICD-10-CM

## 2025-01-28 RX ORDER — WARFARIN SODIUM 5 MG/1
5 TABLET ORAL EVERY EVENING
Qty: 90 TABLET | Refills: 3 | Status: SHIPPED | OUTPATIENT
Start: 2025-01-28 | End: 2026-01-28

## 2025-01-28 RX ORDER — OXYCODONE HYDROCHLORIDE 10 MG/1
10 TABLET ORAL EVERY 4 HOURS PRN
Qty: 75 TABLET | Refills: 0 | Status: SHIPPED | OUTPATIENT
Start: 2025-01-28

## 2025-01-30 ENCOUNTER — HOSPITAL ENCOUNTER (OUTPATIENT)
Dept: RADIOLOGY | Facility: HOSPITAL | Age: 55
Discharge: HOME | End: 2025-01-30
Payer: COMMERCIAL

## 2025-01-30 DIAGNOSIS — R92.8 OTHER ABNORMAL AND INCONCLUSIVE FINDINGS ON DIAGNOSTIC IMAGING OF BREAST: ICD-10-CM

## 2025-01-30 DIAGNOSIS — R92.8 ABNORMAL FINDING ON BREAST IMAGING: ICD-10-CM

## 2025-01-30 DIAGNOSIS — N63.20 BREAST MASS, LEFT: ICD-10-CM

## 2025-01-30 DIAGNOSIS — R92.8 OTHER ABNORMAL AND INCONCLUSIVE FINDINGS ON DIAGNOSTIC IMAGING OF BREAST: Primary | ICD-10-CM

## 2025-01-30 PROCEDURE — 77061 BREAST TOMOSYNTHESIS UNI: CPT | Mod: LT

## 2025-01-30 PROCEDURE — 76982 USE 1ST TARGET LESION: CPT | Mod: 50

## 2025-01-30 PROCEDURE — 76642 ULTRASOUND BREAST LIMITED: CPT | Mod: 50

## 2025-01-31 ENCOUNTER — APPOINTMENT (OUTPATIENT)
Dept: RADIOLOGY | Facility: HOSPITAL | Age: 55
End: 2025-01-31
Payer: COMMERCIAL

## 2025-01-31 ENCOUNTER — TELEPHONE (OUTPATIENT)
Dept: HEMATOLOGY/ONCOLOGY | Facility: HOSPITAL | Age: 55
End: 2025-01-31
Payer: COMMERCIAL

## 2025-01-31 DIAGNOSIS — R92.8 ABNORMAL FINDING ON BREAST IMAGING: ICD-10-CM

## 2025-01-31 DIAGNOSIS — N63.25 MASS OVERLAPPING MULTIPLE QUADRANTS OF LEFT BREAST: Primary | ICD-10-CM

## 2025-01-31 NOTE — TELEPHONE ENCOUNTER
Called to discuss results from yesterdays planned biopsy showing less breast suspicious findings - plan to do a 6 month follow up imaging with surgical NP follow up. No further questions at this time.

## 2025-02-03 ENCOUNTER — ANTICOAGULATION - WARFARIN VISIT (OUTPATIENT)
Dept: CARDIOLOGY | Facility: CLINIC | Age: 55
End: 2025-02-03
Payer: COMMERCIAL

## 2025-02-03 DIAGNOSIS — I82.210 THROMBOSIS OF SUPERIOR VENA CAVA (MULTI): ICD-10-CM

## 2025-02-03 DIAGNOSIS — I26.99 RECURRENT PULMONARY EMBOLISM (MULTI): Primary | ICD-10-CM

## 2025-02-03 DIAGNOSIS — I82.4Z9 DEEP VEIN THROMBOSIS (DVT) OF DISTAL VEIN OF LOWER EXTREMITY, UNSPECIFIED CHRONICITY, UNSPECIFIED LATERALITY (MULTI): ICD-10-CM

## 2025-02-04 LAB
INR IN PPP BY COAGULATION ASSAY EXTERNAL: 1.6 (ref 2–3)
PROTHROMBIN TIME (PT) IN PPP BY COAGULATION ASSAY EXTERNAL: ABNORMAL

## 2025-02-04 NOTE — PROGRESS NOTES
Patient identification verified with 2 identifiers.    Location: [facility] Patient Self-Testing Program [phone]    Referring Physician: DR. WHALEY  Enrollment/ Re-enrollment date: 25   INR Goal: 2.0-3.0  INR monitoring is per [facility] protocol.  Anticoagulation Medication: warfarin  Indication: Pulmonary Embolism (PE)    Subjective   Bleeding signs/symptoms: No    Bruising: No   Major bleeding event: No  Thrombosis signs/symptoms: No  Thromboembolic event: No  Missed doses: No  Extra doses: No  Medication changes: Yes  Warfarin held while inpt and for procedure   Dietary changes: No  Change in health: No  Change in activity: No  Alcohol: No  Other concerns: No    Upcoming Procedures:  Does the Patient Have any upcoming procedures that require interruption in anticoagulation therapy? no  Does the patient require bridging? no       Anticoagulation Summary  As of 2/3/2025      INR goal:  2.0-3.0   TTR:  83.8% (10.8 mo)   INR used for dosin.60 (2025)   Weekly warfarin total:  37.5 mg               Assessment/Plan   Dr. Whaley notified of plan via secure chat.  Agrees with plan.  Subtherapeutic     1. New dose:  TWD increased per protocol.  Pt had been off warfarin for multiple days, resumed on .     2. Next INR:  3 days.  Spoke with pt who resumed warfarin on  after holding for procedure which was cancelled.  Pt restarted at previous TWD.  Increase made today d/t subtherapeutic INR.  If there is a significant increase may consider resuming 5mg daily.      Education provided to patient during the visit:  Patient instructed to call in interim with questions, concerns and changes.   Patient educated on interactions between medications and warfarin.   Patient educated on signs of bleeding/clotting.   Patient educated on compliance with dosing, follow up appointments, and prescribed plan of care.

## 2025-02-07 ENCOUNTER — ANTICOAGULATION - WARFARIN VISIT (OUTPATIENT)
Dept: CARDIOLOGY | Facility: CLINIC | Age: 55
End: 2025-02-07
Payer: COMMERCIAL

## 2025-02-07 DIAGNOSIS — I82.4Z9 DEEP VEIN THROMBOSIS (DVT) OF DISTAL VEIN OF LOWER EXTREMITY, UNSPECIFIED CHRONICITY, UNSPECIFIED LATERALITY (MULTI): ICD-10-CM

## 2025-02-07 DIAGNOSIS — I26.99 RECURRENT PULMONARY EMBOLISM (MULTI): Primary | ICD-10-CM

## 2025-02-07 DIAGNOSIS — I82.210 THROMBOSIS OF SUPERIOR VENA CAVA (MULTI): ICD-10-CM

## 2025-02-07 NOTE — PROGRESS NOTES
Patient identification verified with 2 identifiers.    Location: [facility] Patient Self-Testing Program [phone]    Referring Physician: DR. TRAN  Enrollment/ Re-enrollment date: 25   INR Goal: 2.0-3.0  INR monitoring is per [facility] protocol.  Anticoagulation Medication: warfarin  Indication: Pulmonary Embolism (PE)    Subjective   Bleeding signs/symptoms: No    Bruising: No   Major bleeding event: No  Thrombosis signs/symptoms: No  Thromboembolic event: No  Missed doses: No  Extra doses: No  Medication changes: No  Dietary changes: No  Change in health: No  Change in activity: No  Alcohol: No  Other concerns: No    Upcoming Procedures:  Does the Patient Have any upcoming procedures that require interruption in anticoagulation therapy? no  Does the patient require bridging? no      Anticoagulation Summary  As of 2025      INR goal:  2.0-3.0   TTR:  83.0% (10.9 mo)   INR used for dosin.00 (2025)   Weekly warfarin total:  37.5 mg               Assessment/Plan   Therapeutic     1. New dose: no change    2. Next INR:  5 DAYS  I SPOKE TO PT CONFIRMING CURRENT WARFARIN DOSING.   PT WILL  MAINTAIN WEEKLY DOSE SCHEDULE.  PT VERBALIZED UNDERSTANDING OF THESE DOSING INSTRUCTIONS.      Education provided to patient during the visit:  Patient instructed to call in interim with questions, concerns and changes.   Patient educated on signs of bleeding/clotting.

## 2025-02-11 ENCOUNTER — TELEPHONE (OUTPATIENT)
Dept: ADMISSION | Facility: HOSPITAL | Age: 55
End: 2025-02-11
Payer: COMMERCIAL

## 2025-02-11 DIAGNOSIS — D57.00 SICKLE CELL CRISIS (MULTI): ICD-10-CM

## 2025-02-11 DIAGNOSIS — I82.210 THROMBOSIS OF SUPERIOR VENA CAVA (MULTI): ICD-10-CM

## 2025-02-11 RX ORDER — OXYCODONE HYDROCHLORIDE 10 MG/1
10 TABLET ORAL EVERY 4 HOURS PRN
Qty: 75 TABLET | Refills: 0 | Status: SHIPPED | OUTPATIENT
Start: 2025-02-11

## 2025-02-11 RX ORDER — CYCLOBENZAPRINE HCL 10 MG
10 TABLET ORAL 3 TIMES DAILY PRN
Qty: 30 TABLET | Refills: 3 | Status: SHIPPED | OUTPATIENT
Start: 2025-02-11

## 2025-02-11 NOTE — TELEPHONE ENCOUNTER
Refill Request  Oxycodone 10mg every 4 hrs PRN  Cyclobenzaprine (Flexeril) 10mg 3times daily PRN    Preferred Pharmacy  [facility] #[id number] [place]

## 2025-02-12 ENCOUNTER — ANTICOAGULATION - WARFARIN VISIT (OUTPATIENT)
Dept: CARDIOLOGY | Facility: CLINIC | Age: 55
End: 2025-02-12
Payer: COMMERCIAL

## 2025-02-12 DIAGNOSIS — I82.210 THROMBOSIS OF SUPERIOR VENA CAVA (MULTI): ICD-10-CM

## 2025-02-12 DIAGNOSIS — I26.99 RECURRENT PULMONARY EMBOLISM (MULTI): Primary | ICD-10-CM

## 2025-02-12 DIAGNOSIS — I82.4Z9 DEEP VEIN THROMBOSIS (DVT) OF DISTAL VEIN OF LOWER EXTREMITY, UNSPECIFIED CHRONICITY, UNSPECIFIED LATERALITY (MULTI): ICD-10-CM

## 2025-02-12 LAB
INR IN PPP BY COAGULATION ASSAY EXTERNAL: 1.6
PROTHROMBIN TIME (PT) IN PPP BY COAGULATION ASSAY EXTERNAL: NORMAL

## 2025-02-12 NOTE — PROGRESS NOTES
Patient identification verified with 2 identifiers.    Location: [facility] Patient Self-Testing Program [phone]    Referring Physician: DR. MARIYA TRAN  Enrollment/ Re-enrollment date: 2025   INR Goal: 2.0-3.0  INR monitoring is per [facility] protocol.  Anticoagulation Medication: warfarin  Indication: Pulmonary Embolism (PE)    Subjective   Bleeding signs/symptoms: No    Bruising: No   Major bleeding event: No  Thrombosis signs/symptoms: No  Thromboembolic event: No  Missed doses: Yes  PT MISSED 2.5MG  Extra doses: No  Medication changes: No  Dietary changes: No  Change in health: No  Change in activity: No  Alcohol: No  Other concerns: No    Upcoming Procedures:  Does the Patient Have any upcoming procedures that require interruption in anticoagulation therapy? no  Does the patient require bridging? no      Anticoagulation Summary  As of 2025      INR goal:  2.0-3.0   TTR:  81.8% (11.1 mo)   INR used for dosin.60 (2025)   Weekly warfarin total:  37.5 mg               Assessment/Plan   Subtherapeutic     1. New dose:  SPOKE TO PT. SHE MISSED 2.5MG WARFARIN. WILL HAVE HER TAKE 7.5MG TODAY AND RETEST 25. PT VERBALIZED INSTRUCTIONS CORRECTLY     2. Next INR:  2025      Education provided to patient during the visit:  Patient instructed to call in interim with questions, concerns and changes.   Patient educated on compliance with dosing, follow up appointments, and prescribed plan of care.
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
## 2025-02-13 ENCOUNTER — APPOINTMENT (OUTPATIENT)
Dept: CARDIOLOGY | Facility: CLINIC | Age: 55
End: 2025-02-13
Payer: COMMERCIAL

## 2025-02-14 DIAGNOSIS — Z92.89 HISTORY OF EXCHANGE TRANSFUSION: ICD-10-CM

## 2025-02-14 DIAGNOSIS — G89.4 CHRONIC PAIN SYNDROME: ICD-10-CM

## 2025-02-14 DIAGNOSIS — D57.219 SICKLE-CELL-HEMOGLOBIN C DISEASE WITH CRISIS (MULTI): Primary | ICD-10-CM

## 2025-02-17 ENCOUNTER — ANTICOAGULATION - WARFARIN VISIT (OUTPATIENT)
Dept: CARDIOLOGY | Facility: CLINIC | Age: 55
End: 2025-02-17
Payer: COMMERCIAL

## 2025-02-17 DIAGNOSIS — I82.210 THROMBOSIS OF SUPERIOR VENA CAVA (MULTI): ICD-10-CM

## 2025-02-17 DIAGNOSIS — I26.99 RECURRENT PULMONARY EMBOLISM (MULTI): Primary | ICD-10-CM

## 2025-02-17 DIAGNOSIS — I82.4Z9 DEEP VEIN THROMBOSIS (DVT) OF DISTAL VEIN OF LOWER EXTREMITY, UNSPECIFIED CHRONICITY, UNSPECIFIED LATERALITY (MULTI): ICD-10-CM

## 2025-02-18 ENCOUNTER — ANTICOAGULATION - WARFARIN VISIT (OUTPATIENT)
Dept: CARDIOLOGY | Facility: CLINIC | Age: 55
End: 2025-02-18
Payer: COMMERCIAL

## 2025-02-18 DIAGNOSIS — I82.210 THROMBOSIS OF SUPERIOR VENA CAVA (MULTI): ICD-10-CM

## 2025-02-18 DIAGNOSIS — I26.99 RECURRENT PULMONARY EMBOLISM (MULTI): Primary | ICD-10-CM

## 2025-02-18 DIAGNOSIS — I82.4Z9 DEEP VEIN THROMBOSIS (DVT) OF DISTAL VEIN OF LOWER EXTREMITY, UNSPECIFIED CHRONICITY, UNSPECIFIED LATERALITY (MULTI): ICD-10-CM

## 2025-02-18 NOTE — PROGRESS NOTES
Patient identification verified with 2 identifiers.    Location: [facility] Patient Self-Testing Program [phone]    Referring Physician: DR. MARIYA TRAN  Enrollment/ Re-enrollment date: 2025   INR Goal: 2.0-3.0  INR monitoring is per [facility] protocol.  Anticoagulation Medication: warfarin  Indication: Pulmonary Embolism (PE)    Subjective   Bleeding signs/symptoms: No    Bruising: No   Major bleeding event: No  Thrombosis signs/symptoms: No  Thromboembolic event: No  Missed doses: No  Extra doses: No  Medication changes: No  Dietary changes: No  Change in health: No  Change in activity: No  Alcohol: No  Other concerns: No    Upcoming Procedures:  Does the Patient Have any upcoming procedures that require interruption in anticoagulation therapy? no  Does the patient require bridging? no      Anticoagulation Summary  As of 2025      INR goal:  2.0-3.0   TTR:  80.3% (11.3 mo)   INR used for dosin.00 (2025)   Weekly warfarin total:  37.5 mg               Assessment/Plan   THERAPEUTIC  MAINTAIN TWD  REPEAT INR IN 1 WEEK  PT PREFERS TO TEST ON .      Education provided to patient during the visit:  Patient instructed to call in interim with questions, concerns and changes.   Patient educated on compliance with dosing, follow up appointments, and prescribed plan of care.

## 2025-02-24 ENCOUNTER — OFFICE VISIT (OUTPATIENT)
Dept: CARDIOLOGY | Facility: CLINIC | Age: 55
End: 2025-02-24
Payer: COMMERCIAL

## 2025-02-24 VITALS
BODY MASS INDEX: 35.16 KG/M2 | DIASTOLIC BLOOD PRESSURE: 77 MMHG | RESPIRATION RATE: 18 BRPM | WEIGHT: 211 LBS | SYSTOLIC BLOOD PRESSURE: 114 MMHG | HEIGHT: 65 IN | HEART RATE: 77 BPM | OXYGEN SATURATION: 96 %

## 2025-02-24 DIAGNOSIS — I11.9 HYPERTENSIVE HEART DISEASE WITHOUT HEART FAILURE: ICD-10-CM

## 2025-02-24 DIAGNOSIS — I27.20 PULMONARY HYPERTENSION (MULTI): ICD-10-CM

## 2025-02-24 DIAGNOSIS — I47.10 PAROXYSMAL SUPRAVENTRICULAR TACHYCARDIA (CMS-HCC): Primary | ICD-10-CM

## 2025-02-24 DIAGNOSIS — I42.9 CARDIOMYOPATHY, UNSPECIFIED TYPE (MULTI): ICD-10-CM

## 2025-02-24 DIAGNOSIS — I50.9 CONGESTIVE HEART FAILURE, UNSPECIFIED HF CHRONICITY, UNSPECIFIED HEART FAILURE TYPE: ICD-10-CM

## 2025-02-24 DIAGNOSIS — E78.01 FAMILIAL HYPERCHOLESTEROLEMIA: ICD-10-CM

## 2025-02-24 LAB
ATRIAL RATE: 77 BPM
P AXIS: 67 DEGREES
P OFFSET: 182 MS
P ONSET: 130 MS
PR INTERVAL: 168 MS
Q ONSET: 214 MS
QRS COUNT: 13 BEATS
QRS DURATION: 74 MS
QT INTERVAL: 396 MS
QTC CALCULATION(BAZETT): 448 MS
QTC FREDERICIA: 430 MS
R AXIS: 89 DEGREES
T AXIS: 62 DEGREES
T OFFSET: 412 MS
VENTRICULAR RATE: 77 BPM

## 2025-02-24 PROCEDURE — 99213 OFFICE O/P EST LOW 20 MIN: CPT | Performed by: INTERNAL MEDICINE

## 2025-02-24 PROCEDURE — 1036F TOBACCO NON-USER: CPT | Performed by: INTERNAL MEDICINE

## 2025-02-24 PROCEDURE — G2211 COMPLEX E/M VISIT ADD ON: HCPCS | Performed by: INTERNAL MEDICINE

## 2025-02-24 PROCEDURE — 93005 ELECTROCARDIOGRAM TRACING: CPT | Performed by: INTERNAL MEDICINE

## 2025-02-24 PROCEDURE — 3008F BODY MASS INDEX DOCD: CPT | Performed by: INTERNAL MEDICINE

## 2025-02-24 RX ORDER — METOPROLOL TARTRATE 25 MG/1
25 TABLET, FILM COATED ORAL 2 TIMES DAILY
Qty: 180 TABLET | Refills: 3 | Status: ON HOLD | OUTPATIENT
Start: 2025-02-24 | End: 2026-02-24

## 2025-02-24 RX ORDER — FUROSEMIDE 20 MG/1
20 TABLET ORAL EVERY OTHER DAY
Qty: 45 TABLET | Refills: 3 | Status: ON HOLD | OUTPATIENT
Start: 2025-02-24 | End: 2026-02-24

## 2025-02-24 ASSESSMENT — PAIN SCALES - GENERAL: PAINLEVEL_OUTOF10: 0-NO PAIN
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
## 2025-02-25 ENCOUNTER — TELEPHONE (OUTPATIENT)
Dept: HEMATOLOGY/ONCOLOGY | Facility: HOSPITAL | Age: 55
End: 2025-02-25

## 2025-02-25 ENCOUNTER — TELEPHONE (OUTPATIENT)
Dept: ADMISSION | Facility: HOSPITAL | Age: 55
End: 2025-02-25
Payer: COMMERCIAL

## 2025-02-25 DIAGNOSIS — D57.00 SICKLE CELL CRISIS (MULTI): ICD-10-CM

## 2025-02-25 RX ORDER — OXYCODONE HYDROCHLORIDE 10 MG/1
10 TABLET ORAL EVERY 4 HOURS PRN
Qty: 75 TABLET | Refills: 0 | Status: ON HOLD | OUTPATIENT
Start: 2025-02-25

## 2025-02-25 NOTE — TELEPHONE ENCOUNTER
Pt requesting refill   Oxycodone 10mg. 1 tablet every 4 hrs prn  Pharmacy: [facility] on [address] [place]  Last FUV 12/30, next FUV 3/10

## 2025-02-26 ENCOUNTER — ANTICOAGULATION - WARFARIN VISIT (OUTPATIENT)
Dept: CARDIOLOGY | Facility: CLINIC | Age: 55
End: 2025-02-26
Payer: COMMERCIAL

## 2025-02-26 DIAGNOSIS — I82.210 THROMBOSIS OF SUPERIOR VENA CAVA (MULTI): ICD-10-CM

## 2025-02-26 DIAGNOSIS — I82.4Z9 DEEP VEIN THROMBOSIS (DVT) OF DISTAL VEIN OF LOWER EXTREMITY, UNSPECIFIED CHRONICITY, UNSPECIFIED LATERALITY (MULTI): ICD-10-CM

## 2025-02-26 DIAGNOSIS — I26.99 RECURRENT PULMONARY EMBOLISM (MULTI): Primary | ICD-10-CM

## 2025-02-26 LAB
INR IN PPP BY COAGULATION ASSAY EXTERNAL: 2.3
PROTHROMBIN TIME (PT) IN PPP BY COAGULATION ASSAY EXTERNAL: NORMAL

## 2025-02-26 NOTE — PROGRESS NOTES
Patient identification verified with 2 identifiers.    Location: [facility] Patient Self-Testing Program [phone]    Referring Physician: DR. MARIYA TRAN  Enrollment/ Re-enrollment date: 7/1/2025   INR Goal: 2.0-3.0  INR monitoring is per [facility] protocol.  Anticoagulation Medication: warfarin  Indication: Pulmonary Embolism (PE)    Subjective   Bleeding signs/symptoms: No  Bruising: No   Major bleeding event: No  Thrombosis signs/symptoms: No  Thromboembolic event: No  Missed doses: No  Extra doses: No  Medication changes: No  Dietary changes: No  Change in health: No  Change in activity: No  Alcohol: No  Other concerns: No    Upcoming Procedures:  Does the Patient Have any upcoming procedures that require interruption in anticoagulation therapy? no  Does the patient require bridging? no      Anticoagulation Summary  As of 2/26/2025      INR goal:  2.0-3.0   TTR:  80.3% (11.3 mo)   INR used for dosing:  --   Weekly warfarin total:  37.5 mg               Assessment/Plan   Therapeutic     1. New dose: no change    2. Next INR: 2 weeks      Education provided to patient during the visit:  Patient instructed to call in interim with questions, concerns and changes.

## 2025-02-27 DIAGNOSIS — D57.219 SICKLE-CELL-HEMOGLOBIN C DISEASE WITH CRISIS (MULTI): ICD-10-CM

## 2025-02-27 DIAGNOSIS — G89.4 CHRONIC PAIN SYNDROME: ICD-10-CM

## 2025-02-27 DIAGNOSIS — Z92.89 HISTORY OF EXCHANGE TRANSFUSION: Primary | ICD-10-CM

## 2025-02-27 PROBLEM — I25.5 CARDIOMYOPATHY, ISCHEMIC: Status: ACTIVE | Noted: 2025-02-27

## 2025-02-28 ENCOUNTER — LAB (OUTPATIENT)
Dept: LAB | Facility: HOSPITAL | Age: 55
DRG: 987 | End: 2025-02-28
Payer: COMMERCIAL

## 2025-02-28 DIAGNOSIS — Z79.01 WARFARIN ANTICOAGULATION: ICD-10-CM

## 2025-02-28 DIAGNOSIS — G89.4 CHRONIC PAIN SYNDROME: ICD-10-CM

## 2025-02-28 DIAGNOSIS — Z92.89 HISTORY OF EXCHANGE TRANSFUSION: ICD-10-CM

## 2025-02-28 DIAGNOSIS — D57.219 SICKLE-CELL-HEMOGLOBIN C DISEASE WITH CRISIS (MULTI): ICD-10-CM

## 2025-02-28 DIAGNOSIS — I82.4Z9 DEEP VEIN THROMBOSIS (DVT) OF DISTAL VEIN OF LOWER EXTREMITY, UNSPECIFIED CHRONICITY, UNSPECIFIED LATERALITY (MULTI): ICD-10-CM

## 2025-02-28 DIAGNOSIS — Z86.718 PERSONAL HISTORY OF OTHER VENOUS THROMBOSIS AND EMBOLISM: ICD-10-CM

## 2025-02-28 DIAGNOSIS — D57.1 SICKLE CELL DISEASE WITHOUT CRISIS (MULTI): ICD-10-CM

## 2025-02-28 LAB
ABO GROUP (TYPE) IN BLOOD: NORMAL
ALBUMIN SERPL BCP-MCNC: 3.6 G/DL (ref 3.4–5)
ALP SERPL-CCNC: 124 U/L (ref 33–110)
ALT SERPL W P-5'-P-CCNC: 8 U/L (ref 7–45)
ANION GAP SERPL CALC-SCNC: 8 MMOL/L (ref 10–20)
ANTIBODY SCREEN: NORMAL
AST SERPL W P-5'-P-CCNC: 14 U/L (ref 9–39)
BASOPHILS # BLD AUTO: 0.01 X10*3/UL (ref 0–0.1)
BASOPHILS NFR BLD AUTO: 0.1 %
BILIRUB SERPL-MCNC: 1.5 MG/DL (ref 0–1.2)
BUN SERPL-MCNC: 11 MG/DL (ref 6–23)
CA-I BLD-SCNC: 1.23 MMOL/L (ref 1.1–1.33)
CALCIUM SERPL-MCNC: 8.7 MG/DL (ref 8.6–10.3)
CHLORIDE SERPL-SCNC: 101 MMOL/L (ref 98–107)
CO2 SERPL-SCNC: 32 MMOL/L (ref 21–32)
CREAT SERPL-MCNC: 1.11 MG/DL (ref 0.5–1.05)
EGFRCR SERPLBLD CKD-EPI 2021: 58 ML/MIN/1.73M*2
EOSINOPHIL # BLD AUTO: 0.27 X10*3/UL (ref 0–0.7)
EOSINOPHIL NFR BLD AUTO: 2.4 %
ERYTHROCYTE [DISTWIDTH] IN BLOOD BY AUTOMATED COUNT: 26.5 % (ref 11.5–14.5)
FERRITIN SERPL-MCNC: 31 NG/ML (ref 8–150)
GLUCOSE SERPL-MCNC: 85 MG/DL (ref 74–99)
HCT VFR BLD AUTO: 31.1 % (ref 36–46)
HEMOGLOBIN A2: 3.2 % (ref 2–3.5)
HEMOGLOBIN A: 29.9 % (ref 95.8–98)
HEMOGLOBIN C: 30.3 %
HEMOGLOBIN F: 0.5 % (ref 0–2)
HEMOGLOBIN IDENTIFICATION INTERPRETATION: ABNORMAL
HEMOGLOBIN S: 36.1 %
HGB BLD-MCNC: 10.6 G/DL (ref 12–16)
HGB RETIC QN: 25 PG (ref 28–38)
IMM GRANULOCYTES # BLD AUTO: 0.03 X10*3/UL (ref 0–0.7)
IMM GRANULOCYTES NFR BLD AUTO: 0.3 % (ref 0–0.9)
IMMATURE RETIC FRACTION: 62.6 %
INR PPP: 1.7 (ref 0.9–1.1)
LDH SERPL L TO P-CCNC: 211 U/L (ref 84–246)
LYMPHOCYTES # BLD AUTO: 2.53 X10*3/UL (ref 1.2–4.8)
LYMPHOCYTES NFR BLD AUTO: 22.9 %
MCH RBC QN AUTO: 26.3 PG (ref 26–34)
MCHC RBC AUTO-ENTMCNC: 34.1 G/DL (ref 32–36)
MCV RBC AUTO: 77 FL (ref 80–100)
MONOCYTES # BLD AUTO: 0.65 X10*3/UL (ref 0.1–1)
MONOCYTES NFR BLD AUTO: 5.9 %
NEUTROPHILS # BLD AUTO: 7.58 X10*3/UL (ref 1.2–7.7)
NEUTROPHILS NFR BLD AUTO: 68.4 %
NRBC BLD-RTO: 0.4 /100 WBCS (ref 0–0)
PAPPENHEIMER BOD BLD QL SMEAR: PRESENT
PATH REVIEW-HGB IDENTIFICATION: ABNORMAL
PLATELET # BLD AUTO: 309 X10*3/UL (ref 150–450)
PLATELET CLUMP BLD QL SMEAR: PRESENT
POLYCHROMASIA BLD QL SMEAR: NORMAL
POTASSIUM SERPL-SCNC: 4.7 MMOL/L (ref 3.5–5.3)
PROT SERPL-MCNC: 7.1 G/DL (ref 6.4–8.2)
PROTHROMBIN TIME: 18.5 SECONDS (ref 9.8–12.4)
RBC # BLD AUTO: 4.03 X10*6/UL (ref 4–5.2)
RBC MORPH BLD: NORMAL
RETICS #: 0.06 X10*6/UL (ref 0.02–0.08)
RETICS/RBC NFR AUTO: 1.5 % (ref 0.5–2)
RH FACTOR (ANTIGEN D): NORMAL
SCHISTOCYTES BLD QL SMEAR: NORMAL
SICKLE CELLS BLD QL SMEAR: NORMAL
SODIUM SERPL-SCNC: 136 MMOL/L (ref 136–145)
TARGETS BLD QL SMEAR: NORMAL
WBC # BLD AUTO: 11.1 X10*3/UL (ref 4.4–11.3)

## 2025-02-28 PROCEDURE — 86901 BLOOD TYPING SEROLOGIC RH(D): CPT

## 2025-02-28 PROCEDURE — 84075 ASSAY ALKALINE PHOSPHATASE: CPT

## 2025-02-28 PROCEDURE — 83020 HEMOGLOBIN ELECTROPHORESIS: CPT | Performed by: PATHOLOGY

## 2025-02-28 PROCEDURE — 86850 RBC ANTIBODY SCREEN: CPT

## 2025-02-28 PROCEDURE — 85045 AUTOMATED RETICULOCYTE COUNT: CPT

## 2025-02-28 PROCEDURE — 82330 ASSAY OF CALCIUM: CPT

## 2025-02-28 PROCEDURE — 85025 COMPLETE CBC W/AUTO DIFF WBC: CPT

## 2025-02-28 PROCEDURE — 83615 LACTATE (LD) (LDH) ENZYME: CPT

## 2025-02-28 PROCEDURE — 36415 COLL VENOUS BLD VENIPUNCTURE: CPT

## 2025-02-28 PROCEDURE — 83021 HEMOGLOBIN CHROMOTOGRAPHY: CPT

## 2025-02-28 PROCEDURE — 82728 ASSAY OF FERRITIN: CPT

## 2025-02-28 PROCEDURE — 85610 PROTHROMBIN TIME: CPT

## 2025-02-28 PROCEDURE — 86922 COMPATIBILITY TEST ANTIGLOB: CPT

## 2025-02-28 RX ORDER — DIPHENHYDRAMINE HCL 25 MG
25 CAPSULE ORAL ONCE
Status: CANCELLED | OUTPATIENT
Start: 2025-03-03 | End: 2025-03-03

## 2025-02-28 RX ORDER — CALCIUM CARBONATE 200(500)MG
1500 TABLET,CHEWABLE ORAL EVERY 5 MIN PRN
Status: CANCELLED | OUTPATIENT
Start: 2025-03-03

## 2025-02-28 RX ORDER — CALCIUM GLUCONATE 20 MG/ML
2 INJECTION, SOLUTION INTRAVENOUS ONCE
Status: CANCELLED | OUTPATIENT
Start: 2025-03-03 | End: 2025-03-03

## 2025-02-28 RX ORDER — ACETAMINOPHEN 325 MG/1
650 TABLET ORAL ONCE
Status: CANCELLED | OUTPATIENT
Start: 2025-03-03 | End: 2025-03-03

## 2025-02-28 RX ORDER — DIPHENHYDRAMINE HYDROCHLORIDE 50 MG/ML
25 INJECTION, SOLUTION INTRAMUSCULAR; INTRAVENOUS EVERY 5 MIN PRN
Status: CANCELLED | OUTPATIENT
Start: 2025-03-03

## 2025-03-02 ENCOUNTER — APPOINTMENT (OUTPATIENT)
Dept: RADIOLOGY | Facility: HOSPITAL | Age: 55
End: 2025-03-02
Payer: COMMERCIAL

## 2025-03-02 ENCOUNTER — HOSPITAL ENCOUNTER (INPATIENT)
Facility: HOSPITAL | Age: 55
End: 2025-03-02
Attending: INTERNAL MEDICINE | Admitting: INTERNAL MEDICINE
Payer: COMMERCIAL

## 2025-03-02 ENCOUNTER — APPOINTMENT (OUTPATIENT)
Dept: HEMATOLOGY/ONCOLOGY | Facility: HOSPITAL | Age: 55
DRG: 987 | End: 2025-03-02
Payer: COMMERCIAL

## 2025-03-02 ENCOUNTER — APPOINTMENT (OUTPATIENT)
Dept: RADIOLOGY | Facility: HOSPITAL | Age: 55
DRG: 987 | End: 2025-03-02
Payer: COMMERCIAL

## 2025-03-02 VITALS
HEART RATE: 77 BPM | OXYGEN SATURATION: 93 % | HEIGHT: 65 IN | SYSTOLIC BLOOD PRESSURE: 100 MMHG | DIASTOLIC BLOOD PRESSURE: 62 MMHG | BODY MASS INDEX: 35.15 KG/M2 | WEIGHT: 210.98 LBS | TEMPERATURE: 97.2 F | RESPIRATION RATE: 18 BRPM

## 2025-03-02 DIAGNOSIS — R60.0 LOCALIZED EDEMA: ICD-10-CM

## 2025-03-02 DIAGNOSIS — M79.89 SWELLING OF LOWER EXTREMITY: ICD-10-CM

## 2025-03-02 DIAGNOSIS — I82.4Y9 DEEP VEIN THROMBOSIS (DVT) OF PROXIMAL LOWER EXTREMITY, UNSPECIFIED CHRONICITY, UNSPECIFIED LATERALITY (MULTI): ICD-10-CM

## 2025-03-02 DIAGNOSIS — D57.00 SICKLE CELL ANEMIA WITH PAIN (MULTI): Primary | ICD-10-CM

## 2025-03-02 DIAGNOSIS — H43.12 VITREOUS HEMORRHAGE OF LEFT EYE (MULTI): ICD-10-CM

## 2025-03-02 DIAGNOSIS — Z79.01 WARFARIN ANTICOAGULATION: ICD-10-CM

## 2025-03-02 DIAGNOSIS — T82.868A: ICD-10-CM

## 2025-03-02 LAB
ALBUMIN SERPL BCP-MCNC: 3.6 G/DL (ref 3.4–5)
ALP SERPL-CCNC: 115 U/L (ref 33–110)
ALT SERPL W P-5'-P-CCNC: 7 U/L (ref 7–45)
ANION GAP SERPL CALC-SCNC: 11 MMOL/L (ref 10–20)
APTT PPP: 19 SECONDS (ref 26–36)
APTT PPP: 70 SECONDS (ref 26–36)
AST SERPL W P-5'-P-CCNC: 13 U/L (ref 9–39)
BASOPHILS # BLD AUTO: 0.05 X10*3/UL (ref 0–0.1)
BASOPHILS NFR BLD AUTO: 0.5 %
BILIRUB SERPL-MCNC: 1.8 MG/DL (ref 0–1.2)
BLOOD EXPIRATION DATE: NORMAL
BUN SERPL-MCNC: 10 MG/DL (ref 6–23)
CALCIUM SERPL-MCNC: 9.2 MG/DL (ref 8.6–10.6)
CHLORIDE SERPL-SCNC: 100 MMOL/L (ref 98–107)
CO2 SERPL-SCNC: 30 MMOL/L (ref 21–32)
CREAT SERPL-MCNC: 1.17 MG/DL (ref 0.5–1.05)
DISPENSE STATUS: NORMAL
EGFRCR SERPLBLD CKD-EPI 2021: 55 ML/MIN/1.73M*2
EOSINOPHIL # BLD AUTO: 0.23 X10*3/UL (ref 0–0.7)
EOSINOPHIL NFR BLD AUTO: 2.3 %
ERYTHROCYTE [DISTWIDTH] IN BLOOD BY AUTOMATED COUNT: 26.2 % (ref 11.5–14.5)
GLUCOSE SERPL-MCNC: 120 MG/DL (ref 74–99)
HCT VFR BLD AUTO: 30 % (ref 36–46)
HGB BLD-MCNC: 10.2 G/DL (ref 12–16)
HGB RETIC QN: 30 PG (ref 28–38)
HYPOCHROMIA BLD QL SMEAR: NORMAL
IMM GRANULOCYTES # BLD AUTO: 0.04 X10*3/UL (ref 0–0.7)
IMM GRANULOCYTES NFR BLD AUTO: 0.4 % (ref 0–0.9)
IMMATURE RETIC FRACTION: 61 %
INR PPP: 1.3 (ref 0.9–1.1)
LDH SERPL L TO P-CCNC: 208 U/L (ref 84–246)
LYMPHOCYTES # BLD AUTO: 2.6 X10*3/UL (ref 1.2–4.8)
LYMPHOCYTES NFR BLD AUTO: 26.4 %
MCH RBC QN AUTO: 26.1 PG (ref 26–34)
MCHC RBC AUTO-ENTMCNC: 34 G/DL (ref 32–36)
MCV RBC AUTO: 77 FL (ref 80–100)
MONOCYTES # BLD AUTO: 0.78 X10*3/UL (ref 0.1–1)
MONOCYTES NFR BLD AUTO: 7.9 %
NEUTROPHILS # BLD AUTO: 6.16 X10*3/UL (ref 1.2–7.7)
NEUTROPHILS NFR BLD AUTO: 62.5 %
NRBC BLD-RTO: 6.3 /100 WBCS (ref 0–0)
PLATELET # BLD AUTO: 329 X10*3/UL (ref 150–450)
POLYCHROMASIA BLD QL SMEAR: NORMAL
POTASSIUM SERPL-SCNC: 3.9 MMOL/L (ref 3.5–5.3)
PRODUCT BLOOD TYPE: 5100
PRODUCT BLOOD TYPE: 9500
PRODUCT CODE: NORMAL
PROT SERPL-MCNC: 7.3 G/DL (ref 6.4–8.2)
PROTHROMBIN TIME: 14.8 SECONDS (ref 9.8–12.4)
RBC # BLD AUTO: 3.91 X10*6/UL (ref 4–5.2)
RBC MORPH BLD: NORMAL
RETICS #: 0.08 X10*6/UL (ref 0.02–0.08)
RETICS/RBC NFR AUTO: 2.2 % (ref 0.5–2)
SCHISTOCYTES BLD QL SMEAR: NORMAL
SODIUM SERPL-SCNC: 137 MMOL/L (ref 136–145)
STOMATOCYTES BLD QL SMEAR: NORMAL
TARGETS BLD QL SMEAR: NORMAL
UFH PPP CHRO-ACNC: 0.4 IU/ML
UNIT ABO: NORMAL
UNIT NUMBER: NORMAL
UNIT RH: NORMAL
UNIT VOLUME: 282
UNIT VOLUME: 283
UNIT VOLUME: 350
WBC # BLD AUTO: 9.9 X10*3/UL (ref 4.4–11.3)
XM INTEP: NORMAL

## 2025-03-02 PROCEDURE — 1200000003 HC ONCOLOGY  ROOM WITH TELEMETRY DAILY

## 2025-03-02 PROCEDURE — 99223 1ST HOSP IP/OBS HIGH 75: CPT

## 2025-03-02 PROCEDURE — 85660 RBC SICKLE CELL TEST: CPT

## 2025-03-02 PROCEDURE — 2500000001 HC RX 250 WO HCPCS SELF ADMINISTERED DRUGS (ALT 637 FOR MEDICARE OP)

## 2025-03-02 PROCEDURE — 71045 X-RAY EXAM CHEST 1 VIEW: CPT

## 2025-03-02 PROCEDURE — 36415 COLL VENOUS BLD VENIPUNCTURE: CPT

## 2025-03-02 PROCEDURE — 85610 PROTHROMBIN TIME: CPT

## 2025-03-02 PROCEDURE — 2500000004 HC RX 250 GENERAL PHARMACY W/ HCPCS (ALT 636 FOR OP/ED)

## 2025-03-02 PROCEDURE — 93005 ELECTROCARDIOGRAM TRACING: CPT

## 2025-03-02 PROCEDURE — 85025 COMPLETE CBC W/AUTO DIFF WBC: CPT

## 2025-03-02 PROCEDURE — 71045 X-RAY EXAM CHEST 1 VIEW: CPT | Performed by: RADIOLOGY

## 2025-03-02 PROCEDURE — 70450 CT HEAD/BRAIN W/O DYE: CPT | Performed by: RADIOLOGY

## 2025-03-02 PROCEDURE — 85520 HEPARIN ASSAY: CPT

## 2025-03-02 PROCEDURE — 85045 AUTOMATED RETICULOCYTE COUNT: CPT

## 2025-03-02 PROCEDURE — 86902 BLOOD TYPE ANTIGEN DONOR EA: CPT

## 2025-03-02 PROCEDURE — 85730 THROMBOPLASTIN TIME PARTIAL: CPT

## 2025-03-02 PROCEDURE — 70450 CT HEAD/BRAIN W/O DYE: CPT

## 2025-03-02 PROCEDURE — 83615 LACTATE (LD) (LDH) ENZYME: CPT

## 2025-03-02 PROCEDURE — 93010 ELECTROCARDIOGRAM REPORT: CPT | Performed by: INTERNAL MEDICINE

## 2025-03-02 PROCEDURE — 80053 COMPREHEN METABOLIC PANEL: CPT

## 2025-03-02 PROCEDURE — 2500000005 HC RX 250 GENERAL PHARMACY W/O HCPCS

## 2025-03-02 RX ORDER — FUROSEMIDE 20 MG/1
20 TABLET ORAL EVERY OTHER DAY
Status: DISCONTINUED | OUTPATIENT
Start: 2025-03-03 | End: 2025-03-09 | Stop reason: HOSPADM

## 2025-03-02 RX ORDER — CYCLOBENZAPRINE HCL 10 MG
10 TABLET ORAL 3 TIMES DAILY PRN
Status: DISCONTINUED | OUTPATIENT
Start: 2025-03-02 | End: 2025-03-09 | Stop reason: HOSPADM

## 2025-03-02 RX ORDER — ONDANSETRON 4 MG/1
4 TABLET, FILM COATED ORAL EVERY 8 HOURS PRN
COMMUNITY

## 2025-03-02 RX ORDER — POLYETHYLENE GLYCOL 3350 17 G/17G
17 POWDER, FOR SOLUTION ORAL DAILY
Status: DISCONTINUED | OUTPATIENT
Start: 2025-03-02 | End: 2025-03-09 | Stop reason: HOSPADM

## 2025-03-02 RX ORDER — FOLIC ACID 1 MG/1
1 TABLET ORAL DAILY
Status: DISCONTINUED | OUTPATIENT
Start: 2025-03-02 | End: 2025-03-09 | Stop reason: HOSPADM

## 2025-03-02 RX ORDER — HEPARIN SODIUM 10000 [USP'U]/100ML
0-4000 INJECTION, SOLUTION INTRAVENOUS CONTINUOUS
Status: DISCONTINUED | OUTPATIENT
Start: 2025-03-02 | End: 2025-03-03

## 2025-03-02 RX ORDER — METOPROLOL TARTRATE 25 MG/1
25 TABLET, FILM COATED ORAL 2 TIMES DAILY
Status: DISCONTINUED | OUTPATIENT
Start: 2025-03-02 | End: 2025-03-09 | Stop reason: HOSPADM

## 2025-03-02 RX ORDER — TRIAMCINOLONE ACETONIDE 1 MG/G
CREAM TOPICAL 2 TIMES DAILY
Status: DISCONTINUED | OUTPATIENT
Start: 2025-03-02 | End: 2025-03-09 | Stop reason: HOSPADM

## 2025-03-02 RX ORDER — OXYCODONE HYDROCHLORIDE 10 MG/1
10 TABLET ORAL EVERY 4 HOURS PRN
Status: DISCONTINUED | OUTPATIENT
Start: 2025-03-02 | End: 2025-03-02

## 2025-03-02 RX ORDER — TRAZODONE HYDROCHLORIDE 50 MG/1
50 TABLET ORAL NIGHTLY PRN
Status: DISCONTINUED | OUTPATIENT
Start: 2025-03-02 | End: 2025-03-09 | Stop reason: HOSPADM

## 2025-03-02 RX ORDER — PETROLATUM 420 MG/G
2 OINTMENT TOPICAL 2 TIMES DAILY
Status: ACTIVE | OUTPATIENT
Start: 2025-03-02 | End: 2025-03-03

## 2025-03-02 RX ORDER — ATROPINE SULFATE 10 MG/ML
1 SOLUTION/ DROPS OPHTHALMIC NIGHTLY
Status: DISCONTINUED | OUTPATIENT
Start: 2025-03-02 | End: 2025-03-09 | Stop reason: HOSPADM

## 2025-03-02 RX ORDER — HYDROMORPHONE HYDROCHLORIDE 1 MG/ML
1 INJECTION, SOLUTION INTRAMUSCULAR; INTRAVENOUS; SUBCUTANEOUS
Status: DISCONTINUED | OUTPATIENT
Start: 2025-03-02 | End: 2025-03-04

## 2025-03-02 RX ORDER — ONDANSETRON HYDROCHLORIDE 8 MG/1
8 TABLET, FILM COATED ORAL EVERY 8 HOURS PRN
Status: DISCONTINUED | OUTPATIENT
Start: 2025-03-02 | End: 2025-03-09 | Stop reason: HOSPADM

## 2025-03-02 RX ORDER — CETIRIZINE HYDROCHLORIDE 10 MG/1
10 TABLET ORAL DAILY
Status: DISCONTINUED | OUTPATIENT
Start: 2025-03-02 | End: 2025-03-09 | Stop reason: HOSPADM

## 2025-03-02 RX ORDER — ALBUTEROL SULFATE 90 UG/1
2 INHALANT RESPIRATORY (INHALATION) EVERY 6 HOURS PRN
Status: DISCONTINUED | OUTPATIENT
Start: 2025-03-02 | End: 2025-03-09 | Stop reason: HOSPADM

## 2025-03-02 RX ORDER — SENNOSIDES 8.6 MG/1
2 TABLET ORAL 2 TIMES DAILY
Status: DISCONTINUED | OUTPATIENT
Start: 2025-03-02 | End: 2025-03-09 | Stop reason: HOSPADM

## 2025-03-02 RX ORDER — SENNOSIDES 8.6 MG/1
1 TABLET ORAL 2 TIMES DAILY
Status: DISCONTINUED | OUTPATIENT
Start: 2025-03-02 | End: 2025-03-02

## 2025-03-02 RX ORDER — FLUTICASONE PROPIONATE 50 MCG
2 SPRAY, SUSPENSION (ML) NASAL DAILY PRN
Status: DISCONTINUED | OUTPATIENT
Start: 2025-03-02 | End: 2025-03-09 | Stop reason: HOSPADM

## 2025-03-02 RX ADMIN — HYDROMORPHONE HYDROCHLORIDE 1 MG: 1 INJECTION, SOLUTION INTRAMUSCULAR; INTRAVENOUS; SUBCUTANEOUS at 18:32

## 2025-03-02 RX ADMIN — HYDROMORPHONE HYDROCHLORIDE 1 MG: 1 INJECTION, SOLUTION INTRAMUSCULAR; INTRAVENOUS; SUBCUTANEOUS at 14:44

## 2025-03-02 RX ADMIN — METOPROLOL TARTRATE 25 MG: 25 TABLET, FILM COATED ORAL at 20:14

## 2025-03-02 RX ADMIN — CETIRIZINE HYDROCHLORIDE 10 MG: 10 TABLET, FILM COATED ORAL at 14:45

## 2025-03-02 RX ADMIN — HEPARIN SODIUM 1148 UNITS/HR: 10000 INJECTION, SOLUTION INTRAVENOUS at 17:15

## 2025-03-02 RX ADMIN — HYDROMORPHONE HYDROCHLORIDE 0.5 MG: 1 INJECTION, SOLUTION INTRAMUSCULAR; INTRAVENOUS; SUBCUTANEOUS at 23:17

## 2025-03-02 RX ADMIN — HYDROMORPHONE HYDROCHLORIDE 1 MG: 1 INJECTION, SOLUTION INTRAMUSCULAR; INTRAVENOUS; SUBCUTANEOUS at 21:33

## 2025-03-02 RX ADMIN — HYDROMORPHONE HYDROCHLORIDE 0.5 MG: 1 INJECTION, SOLUTION INTRAMUSCULAR; INTRAVENOUS; SUBCUTANEOUS at 16:32

## 2025-03-02 RX ADMIN — ATROPINE SULFATE 1 DROP: 10 SOLUTION/ DROPS OPHTHALMIC at 21:48

## 2025-03-02 SDOH — ECONOMIC STABILITY: FOOD INSECURITY: WITHIN THE PAST 12 MONTHS, YOU WORRIED THAT YOUR FOOD WOULD RUN OUT BEFORE YOU GOT THE MONEY TO BUY MORE.: NEVER TRUE

## 2025-03-02 SDOH — ECONOMIC STABILITY: INCOME INSECURITY: IN THE PAST 12 MONTHS HAS THE ELECTRIC, GAS, OIL, OR WATER COMPANY THREATENED TO SHUT OFF SERVICES IN YOUR HOME?: NO

## 2025-03-02 SDOH — SOCIAL STABILITY: SOCIAL INSECURITY: WITHIN THE LAST YEAR, HAVE YOU BEEN HUMILIATED OR EMOTIONALLY ABUSED IN OTHER WAYS BY YOUR PARTNER OR EX-PARTNER?: NO

## 2025-03-02 SDOH — SOCIAL STABILITY: SOCIAL INSECURITY: ARE THERE ANY APPARENT SIGNS OF INJURIES/BEHAVIORS THAT COULD BE RELATED TO ABUSE/NEGLECT?: NO

## 2025-03-02 SDOH — SOCIAL STABILITY: SOCIAL INSECURITY: ARE YOU OR HAVE YOU BEEN THREATENED OR ABUSED PHYSICALLY, EMOTIONALLY, OR SEXUALLY BY ANYONE?: NO

## 2025-03-02 SDOH — SOCIAL STABILITY: SOCIAL INSECURITY: DOES ANYONE TRY TO KEEP YOU FROM HAVING/CONTACTING OTHER FRIENDS OR DOING THINGS OUTSIDE YOUR HOME?: NO

## 2025-03-02 SDOH — SOCIAL STABILITY: SOCIAL INSECURITY: DO YOU FEEL ANYONE HAS EXPLOITED OR TAKEN ADVANTAGE OF YOU FINANCIALLY OR OF YOUR PERSONAL PROPERTY?: NO

## 2025-03-02 SDOH — SOCIAL STABILITY: SOCIAL INSECURITY: HAVE YOU HAD THOUGHTS OF HARMING ANYONE ELSE?: NO

## 2025-03-02 SDOH — SOCIAL STABILITY: SOCIAL INSECURITY: WERE YOU ABLE TO COMPLETE ALL THE BEHAVIORAL HEALTH SCREENINGS?: YES

## 2025-03-02 SDOH — SOCIAL STABILITY: SOCIAL INSECURITY: ABUSE: ADULT

## 2025-03-02 SDOH — ECONOMIC STABILITY: FOOD INSECURITY: WITHIN THE PAST 12 MONTHS, THE FOOD YOU BOUGHT JUST DIDN'T LAST AND YOU DIDN'T HAVE MONEY TO GET MORE.: NEVER TRUE

## 2025-03-02 SDOH — SOCIAL STABILITY: SOCIAL INSECURITY: WITHIN THE LAST YEAR, HAVE YOU BEEN AFRAID OF YOUR PARTNER OR EX-PARTNER?: NO

## 2025-03-02 SDOH — SOCIAL STABILITY: SOCIAL INSECURITY: HAS ANYONE EVER THREATENED TO HURT YOUR FAMILY OR YOUR PETS?: NO

## 2025-03-02 SDOH — SOCIAL STABILITY: SOCIAL INSECURITY: HAVE YOU HAD ANY THOUGHTS OF HARMING ANYONE ELSE?: NO

## 2025-03-02 SDOH — SOCIAL STABILITY: SOCIAL INSECURITY: DO YOU FEEL UNSAFE GOING BACK TO THE PLACE WHERE YOU ARE LIVING?: NO

## 2025-03-02 ASSESSMENT — COGNITIVE AND FUNCTIONAL STATUS - GENERAL
DAILY ACTIVITIY SCORE: 24
DAILY ACTIVITIY SCORE: 24
MOBILITY SCORE: 24
PATIENT BASELINE BEDBOUND: NO
MOBILITY SCORE: 24

## 2025-03-02 ASSESSMENT — COLUMBIA-SUICIDE SEVERITY RATING SCALE - C-SSRS
2. HAVE YOU ACTUALLY HAD ANY THOUGHTS OF KILLING YOURSELF?: NO
6. HAVE YOU EVER DONE ANYTHING, STARTED TO DO ANYTHING, OR PREPARED TO DO ANYTHING TO END YOUR LIFE?: NO
1. IN THE PAST MONTH, HAVE YOU WISHED YOU WERE DEAD OR WISHED YOU COULD GO TO SLEEP AND NOT WAKE UP?: NO

## 2025-03-02 ASSESSMENT — PAIN SCALES - GENERAL
PAINLEVEL_OUTOF10: 8
PAINLEVEL_OUTOF10: 9
PAINLEVEL_OUTOF10: 8
PAINLEVEL_OUTOF10: 8
PAINLEVEL_OUTOF10: 9
PAINLEVEL_OUTOF10: 8
PAINLEVEL_OUTOF10: 7
PAINLEVEL_OUTOF10: 6

## 2025-03-02 ASSESSMENT — ACTIVITIES OF DAILY LIVING (ADL)
WALKS IN HOME: INDEPENDENT
HEARING - LEFT EAR: FUNCTIONAL
ADEQUATE_TO_COMPLETE_ADL: YES
HEARING - RIGHT EAR: FUNCTIONAL
FEEDING YOURSELF: INDEPENDENT
PATIENT'S MEMORY ADEQUATE TO SAFELY COMPLETE DAILY ACTIVITIES?: YES
LACK_OF_TRANSPORTATION: NO
DRESSING YOURSELF: INDEPENDENT
GROOMING: INDEPENDENT
JUDGMENT_ADEQUATE_SAFELY_COMPLETE_DAILY_ACTIVITIES: YES
TOILETING: INDEPENDENT
BATHING: INDEPENDENT

## 2025-03-02 ASSESSMENT — PAIN SCALES - PAIN ASSESSMENT IN ADVANCED DEMENTIA (PAINAD)
TOTALSCORE: MEDICATION (SEE MAR)
TOTALSCORE: MEDICATION (SEE MAR)

## 2025-03-02 ASSESSMENT — LIFESTYLE VARIABLES
AUDIT-C TOTAL SCORE: 0
AUDIT-C TOTAL SCORE: 0
HOW OFTEN DO YOU HAVE A DRINK CONTAINING ALCOHOL: NEVER
HOW MANY STANDARD DRINKS CONTAINING ALCOHOL DO YOU HAVE ON A TYPICAL DAY: PATIENT DOES NOT DRINK
SKIP TO QUESTIONS 9-10: 1
HOW OFTEN DO YOU HAVE 6 OR MORE DRINKS ON ONE OCCASION: NEVER

## 2025-03-02 ASSESSMENT — PAIN DESCRIPTION - LOCATION
LOCATION: GENERALIZED
LOCATION: GENERALIZED

## 2025-03-02 ASSESSMENT — PAIN - FUNCTIONAL ASSESSMENT
PAIN_FUNCTIONAL_ASSESSMENT: 0-10

## 2025-03-02 ASSESSMENT — PATIENT HEALTH QUESTIONNAIRE - PHQ9
SUM OF ALL RESPONSES TO PHQ9 QUESTIONS 1 & 2: 0
2. FEELING DOWN, DEPRESSED OR HOPELESS: NOT AT ALL
1. LITTLE INTEREST OR PLEASURE IN DOING THINGS: NOT AT ALL

## 2025-03-02 NOTE — Clinical Note
Right femoral 13 fr trialysis placed. Catheter aspirated, flushed, capped, and locked. No hematoma. Stat lock and chg dressing placed. Dressing clean, dry, and intact. Pt received 2 mg versed and 100 mcg fentanyl IVP. Pt transferred to RPCU RPCU RN received report.

## 2025-03-02 NOTE — CARE PLAN
The patient's goals for the shift include      The clinical goals for the shift include pt remain vss and hds through EOS      Problem: Pain - Adult  Goal: Verbalizes/displays adequate comfort level or baseline comfort level  Outcome: Progressing     Problem: Safety - Adult  Goal: Free from fall injury  Outcome: Progressing     Problem: Discharge Planning  Goal: Discharge to home or other facility with appropriate resources  Outcome: Progressing     Problem: Chronic Conditions and Co-morbidities  Goal: Patient's chronic conditions and co-morbidity symptoms are monitored and maintained or improved  Outcome: Progressing     Problem: Nutrition  Goal: Nutrient intake appropriate for maintaining nutritional needs  Outcome: Progressing

## 2025-03-02 NOTE — PROGRESS NOTES
Pharmacy Medication History Review    Senait Narvaez is a 55 y.o. female admitted for Cardiomyopathy, ischemic. Pharmacy reviewed the patient's [id number] medications and allergies for accuracy.    The list below reflects the updated PTA list.   Prior to Admission Medications   Prescriptions Last Dose Informant   albuterol 90 mcg/actuation inhaler  Self   Sig: Inhale 2 puffs every 6 hours if needed for wheezing.   ascorbic acid (Vitamin C) 500 mg chewable tablet  Self   Sig: Chew 1 tablet (500 mg) once daily.   cyclobenzaprine (Flexeril) 10 mg tablet  Self   Sig: Take 1 tablet (10 mg) by mouth 3 times a day as needed for muscle spasms.   fluticasone (Flonase) 50 mcg/actuation nasal spray  Self   Sig: Administer 2 sprays into each nostril once daily as needed for rhinitis or allergies.   folic acid (Folvite) 1 mg tablet  Self   Sig: Take 1 tablet (1 mg) by mouth once daily.   furosemide (Lasix) 20 mg tablet  Self   Sig: Take 1 tablet (20 mg) by mouth every other day.   loratadine (Claritin) 10 mg tablet  Self   Sig: Take 1 tablet (10 mg) by mouth once daily as needed for allergies.   metoprolol tartrate (Lopressor) 25 mg tablet  Self   Sig: Take 1 tablet (25 mg) by mouth 2 times a day.   multivitamin tablet  Self   Sig: Take 1 tablet by mouth once daily.   naloxone (Narcan) 4 mg/0.1 mL nasal spray  Self   Sig: Administer 1 spray (4 mg) into affected nostril(s) if needed for opioid reversal. May repeat every 2-3 minutes if needed, alternating nostrils, until medical assistance becomes available.   ondansetron (Zofran) 4 mg tablet  Self   Sig: Take 1 tablet (4 mg) by mouth every 8 hours if needed for nausea or vomiting.   oxyCODONE (Roxicodone) 10 mg immediate release tablet  Self   Sig: Take 1 tablet (10 mg) by mouth every 4 hours if needed for severe pain (7 - 10) (pain).   oxygen (O2) gas therapy  Self   Sig: Inhale 1 each continuously.   traZODone (Desyrel) 50 mg tablet  Self   Sig: Take 1 tablet (50 mg) by  mouth as needed at bedtime for sleep.   triamcinolone (Kenalog) 0.1 % cream  Self   Sig: Apply topically 2 times a day. Apply triamcinolone 0.1% cream to both the black plaques as well as the red areas across the trunk and thighs   warfarin (Coumadin) 5 mg tablet  Self   Sig: Take 1 tablet (5 mg) by mouth once daily in the evening. Patient takes 7.5 mg every Tuesday     white petrolatum (Aquaphor) 41 % ointment ointment  Self   Sig: Apply 2 Applications topically 2 times a day. Apply vaseline to all eroded/denuded areas. Mepilex transfer sheets may be used for areas of friction      Facility-Administered Medications: None        The list below reflects the updated allergy list. Please review each documented allergy for additional clarification and justification.  Allergies  Reviewed by Darius Little PharmD on 3/2/2025        Severity Reactions Comments    Vancomycin High Rash     Oxycontin [oxycodone] Not Specified Drowsiness             Patient accepts M2B at discharge.     Sources used to complete the med history include:    [facility], [place]  Pharmacy dispense history  Patient interview Good historian  Chart Review  Care Everywhere  Cardiology-Anticoagulant Office visit on 2/26      Below are additional concerns with the patient's PTA list.  Patient is a good historian and recalls most medications from memory. When prompted with drug names and indications, she can confirm if she is taking them.  Patient reports taking Warfarin 5 mg daily, except on Tuesdays when she takes 7.5 mg.      Medications ADDED:  Zofran  Medications CHANGED:  Warfarin dosing   Medications REMOVED:   none    Darius Little PharmD  Transitions of Care Pharmacist  [facility]  Please reach out via Secure Chat for questions, or if no response call [place] or [facility]

## 2025-03-02 NOTE — H&P
Admission note   [facility]  March 2, 2025   Patient: Senait Narvaez    Medical Record: [id number]    SUBJECTIVE     Senait Narvaez is a 55 y.o. 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    Patient reports that she woke up at 8 AM this morning with new onset sudden painless vision loss in left eye. She says she sees floaters and her vision is blurry. She denies complete vision loss. She has a history of retinal detachment of the R eye approximately in 1986 s/p repair with scleral bucking . She reports partial blindness at baseline in R eye and denies worsening vision. She denies eye discharge, headache, dizziness, lightheadedness, weakness, confusion, dysphagia, dysarthria.    Patient also reports pain typical of her sickle cell pain crisis including pain in chest, back, arms, and legs. Ongoing for the past month. She avoid being medical evaluated because she did not want ot have to be admitted. She last took home oxycodone 10 mg last night with minimal relief. She denies flu-like symptoms, dyspnea, fever, cough, worsening chest pain.    Patient last took warfarin 5 mg on Friday 2/28. Her typical dosing schedule is 5 mg nightly except on Tuesdays she will take 7.5 mg. She does not take warfarin on Saturday and Sundays. She takes the rest of her medications including metoprolol and lasix as prescribed. Last seen by cardiology in clinic 2/24.    She was recently hospitalized on 1/19 as a direct admission for close hemodynamic monitoring (d/t hx arrhythmia) post-scheduled RBC exchange transfusion 1/20. Her most recent  transfusion was notable for increased pain which required a longer stay.     12 point ROS otherwise negative, unless indicated above.     Upon arrival:  Vital signs: Afebrile. HR 78. /79. O2 94% RA RR 18  Labs: Hb/hemolysis labs 2/28 at baseline; pre-exchange  Imaging: CXR, CTH, MRI brain ordered  EKG: ordered    Past Medical History:    Past Medical History:   Diagnosis Date    Asthma     CHF (congestive heart failure)     Chronic pain disorder     Compression of vein 02/19/2020    Superior vena cava syndrome    Hypotension, unspecified 12/10/2020       Home Medications  Medications Prior to Admission   Medication Sig Dispense Refill Last Dose/Taking    albuterol 90 mcg/actuation inhaler Inhale 2 puffs every 6 hours if needed for wheezing. 18 g 3     ascorbic acid (Vitamin C) 500 mg chewable tablet Chew 1 tablet (500 mg) once daily.       cyclobenzaprine (Flexeril) 10 mg tablet Take 1 tablet (10 mg) by mouth 3 times a day as needed for muscle spasms. 30 tablet 3     fluticasone (Flonase) 50 mcg/actuation nasal spray Administer 2 sprays into each nostril once daily as needed for rhinitis or allergies.       folic acid (Folvite) 1 mg tablet Take 1 tablet (1 mg) by mouth once daily.       furosemide (Lasix) 20 mg tablet Take 1 tablet (20 mg) by mouth every other day. 45 tablet 3     loratadine (Claritin) 10 mg tablet Take 1 tablet (10 mg) by mouth once daily as needed for allergies.       metoprolol tartrate (Lopressor) 25 mg tablet Take 1 tablet (25 mg) by mouth 2 times a day. 180 tablet 3     multivitamin tablet Take 1 tablet by mouth once daily.       naloxone (Narcan) 4 mg/0.1 mL nasal spray Administer 1 spray (4 mg) into affected nostril(s) if needed for opioid reversal. May repeat every 2-3 minutes if needed, alternating nostrils, until medical assistance becomes available. 2 each 0     oxyCODONE (Roxicodone) 10 mg immediate release tablet Take 1 tablet (10 mg) by mouth every 4 hours if needed for severe  pain (7 - 10) (pain). 75 tablet 0     oxygen (O2) gas therapy Inhale 1 each continuously. 1 each 0     traZODone (Desyrel) 50 mg tablet Take 1 tablet (50 mg) by mouth as needed at bedtime for sleep. 30 tablet 0     triamcinolone (Kenalog) 0.1 % cream Apply topically 2 times a day. Apply triamcinolone 0.1% cream to both the black plaques as well as the red areas across the trunk and thighs       warfarin (Coumadin) 5 mg tablet Take 1 tablet (5 mg) by mouth once daily in the evening. 90 tablet 3     white petrolatum (Aquaphor) 41 % ointment ointment Apply 2 Applications topically 2 times a day. Apply vaseline to all eroded/denuded areas. Mepilex transfer sheets may be used for areas of friction          Scheduled Medications:  PRN Medications:    cetirizine, 10 mg, oral, Daily  folic acid, 1 mg, oral, Daily  [START ON 3/3/2025] furosemide, 20 mg, oral, Every other day  heparin, 4,000 Units, intravenous, Once  metoprolol tartrate, 25 mg, oral, BID  sennosides, 1 tablet, oral, BID  triamcinolone, , Topical, BID  white petrolatum, 2 Application, Topical, BID     PRN medications: albuterol, cyclobenzaprine, fluticasone, HYDROmorphone, HYDROmorphone, ondansetron, oxygen, traZODone       Surgical History:    Past Surgical History:   Procedure Laterality Date    APPENDECTOMY  08/05/2013    Appendectomy    BIOPSY  9/15/2024    CHOLECYSTECTOMY  08/05/2013    Cholecystectomy    CT ANGIO NECK  10/19/2022    CT NECK ANGIO W AND WO IV CONTRAST 10/19/2022 [facility] CLINICAL LEGACY    CT GUIDED PERCUTANEOUS BIOPSY LYMPH NODE SUPERFICIAL  9/19/2019    CT GUIDED PERCUTANEOUS BIOPSY LYMPH NODE SUPERFICIAL 9/19/2019 [facility] INPATIENT LEGACY    IR CVC NONTUNNELED  10/26/2023    IR CVC NONTUNNELED 10/26/2023 [facility] ANGIO    IR CVC NONTUNNELED  12/7/2023    IR CVC NONTUNNELED 12/7/2023 Marcos Moser MD [facility] ANGIO    IR CVC TUNNELED  3/13/2015    IR CVC TUNNELED 3/13/2015 [facility] CLINICAL LEGACY    IR CVC TUNNELED  1/29/2016    IR CVC TUNNELED  1/29/2016 [facility] AIB LEGACY    IR CVC TUNNELED  1/17/2018    IR CVC TUNNELED 1/17/2018 [facility] AIB LEGACY    IR CVC TUNNELED  2/22/2018    IR CVC TUNNELED 2/22/2018 [facility] AIB LEGACY    IR CVC TUNNELED  3/22/2018    IR CVC TUNNELED 3/22/2018 [facility] CLINICAL LEGACY    IR CVC TUNNELED  4/18/2018    IR CVC TUNNELED 4/18/2018 [facility] AIB LEGACY    IR CVC TUNNELED  5/16/2018    IR CVC TUNNELED 5/16/2018 [facility] AIB LEGACY    IR CVC TUNNELED  6/12/2018    IR CVC TUNNELED 6/12/2018 [facility] AIB LEGACY    IR CVC TUNNELED  1/21/2020    IR CVC TUNNELED 1/21/2020 [facility] INPATIENT LEGACY    IR CVC TUNNELED  2/28/2020    IR CVC TUNNELED 2/28/2020 [facility] ANCILLARY LEGACY    IR CVC TUNNELED  4/10/2020    IR CVC TUNNELED 4/10/2020 [facility] AIB LEGACY    IR CVC TUNNELED  5/22/2020    IR CVC TUNNELED 5/22/2020 [facility] ANCILLARY LEGACY    IR CVC TUNNELED  6/19/2020    IR CVC TUNNELED 6/19/2020 [facility] AIB LEGACY    IR CVC TUNNELED  7/23/2020    IR CVC TUNNELED 7/23/2020 [facility] AIB LEGACY    IR CVC TUNNELED  9/4/2020    IR CVC TUNNELED 9/4/2020 [facility] AIB LEGACY    IR CVC TUNNELED  10/9/2020    IR CVC TUNNELED 10/9/2020 [facility] ANCILLARY LEGACY    IR CVC TUNNELED  11/13/2020    IR CVC TUNNELED 11/13/2020 [facility] AIB LEGACY    IR CVC TUNNELED  12/18/2020    IR CVC TUNNELED 12/18/2020 [facility] AIB LEGACY    IR CVC TUNNELED  1/22/2021    IR CVC TUNNELED 1/22/2021 [facility] AIB LEGACY    IR CVC TUNNELED  2/26/2021    IR CVC TUNNELED 2/26/2021 [facility] CLINICAL LEGACY    IR CVC TUNNELED  4/2/2021    IR CVC TUNNELED 4/2/2021 [facility] AIB LEGACY    IR CVC TUNNELED  5/7/2021    IR CVC TUNNELED 5/7/2021 [facility] ANCILLARY LEGACY    IR CVC TUNNELED  6/11/2021    IR CVC TUNNELED 6/11/2021 [facility] AIB LEGACY    IR CVC TUNNELED  7/16/2021    IR CVC TUNNELED 7/16/2021 [facility] ANCILLARY LEGACY    IR CVC TUNNELED  8/20/2021    IR CVC TUNNELED 8/20/2021 [facility] AIB LEGACY    IR CVC TUNNELED  9/24/2021    IR CVC TUNNELED 9/24/2021 [facility] AIB LEGACY    IR CVC TUNNELED  10/29/2021    IR CVC TUNNELED 10/29/2021 [facility] AIB LEGACY    IR CVC TUNNELED   12/3/2021    IR CVC TUNNELED 12/3/2021 [facility] AIB LEGACY    IR CVC TUNNELED  1/7/2022    IR CVC TUNNELED 1/7/2022 [facility] ANCILLARY LEGACY    IR CVC TUNNELED  2/23/2022    IR CVC TUNNELED 2/23/2022 [facility] CLINICAL LEGACY    IR CVC TUNNELED  3/25/2022    IR CVC TUNNELED 3/25/2022 [facility] ANCILLARY LEGACY    IR CVC TUNNELED  4/22/2022    IR CVC TUNNELED 4/22/2022 [facility] ANCILLARY LEGACY    IR CVC TUNNELED  6/3/2022    IR CVC TUNNELED 6/3/2022 [facility] ANCILLARY LEGACY    IR CVC TUNNELED  9/18/2017    IR CVC TUNNELED 9/18/2017 [facility] AIB LEGACY    IR CVC TUNNELED  10/30/2017    IR CVC TUNNELED 10/30/2017 [facility]IB LEGACY    IR CVC TUNNELED  12/19/2017    IR CVC TUNNELED 12/19/2017 [facility] [place]    IR CVC TUNNELED  1/6/2023    IR CVC TUNNELED 1/6/2023 [facility] OFFICE LEGACY    IR CVC TUNNELED  2/24/2023    IR CVC TUNNELED [facility] ANGIO    IR CVC TUNNELED  7/8/2022    IR CVC TUNNELED 7/8/2022 [facility] ANCILLARY LEGACY    IR CVC TUNNELED  8/12/2022    IR CVC TUNNELED 8/12/2022 [facility] CLINICAL LEGACY    IR CVC TUNNELED  9/16/2022    IR CVC TUNNELED 9/16/2022 [facility] ANCILLARY LEGACY    IR CVC TUNNELED  10/20/2022    IR CVC TUNNELED 10/20/2022 [facility] CLINICAL LEGACY    IR CVC TUNNELED  11/29/2022    IR CVC TUNNELED 11/29/2022 [facility] ANCILLARY LEGACY    IR CVC TUNNELED  3/31/2023    IR CVC TUNNELED [facility] ANGIO    IR CVC TUNNELED  6/9/2023    IR CVC TUNNELED 6/9/2023 [facility] ANGIO    IR CVC TUNNELED  7/20/2023    IR CVC TUNNELED 7/20/2023 [facility] ANGIO    IR CVC TUNNELED  8/16/2023    IR CVC TUNNELED 8/16/2023 [facility] ANGIO    IR CVC TUNNELED  9/21/2023    IR CVC TUNNELED 9/21/2023 [facility] ANGIO    MR HEAD ANGIO WO IV CONTRAST  8/16/2014    MR HEAD ANGIO WO IV CONTRAST 8/16/2014 [facility] ANCILLARY LEGACY    MR NECK ANGIO WO IV CONTRAST  8/16/2014    MR NECK ANGIO WO IV CONTRAST 8/16/2014 [facility] ANCILLARY LEGACY    OTHER SURGICAL HISTORY  05/13/2014    Fluid Drained, Retina Inspected: Breaks Supported By Buckle    OTHER SURGICAL HISTORY  10/09/2014    Closed Treatment Of Fracture Of The Lateral  Malleolus    OTHER SURGICAL HISTORY  06/09/2014    Salpingo-oophorectomy Right Side    US GUIDED BIOPSY LYMPH NODE SUPERFICIAL  9/17/2019    US GUIDED BIOPSY LYMPH NODE SUPERFICIAL 9/17/2019 [facility] [place]       Allergies:    Allergies   Allergen Reactions    Vancomycin Rash    Oxycontin [Oxycodone] Drowsiness       Social History  Living Situation: home with daughter  Alcohol: denies  Alcohol Use: Not At Risk (3/2/2025)    AUDIT-C     Frequency of Alcohol Consumption: Never     Average Number of Drinks: Patient does not drink     Frequency of Binge Drinking: Never     Tobacco:   Tobacco Use: Medium Risk (2/24/2025)    Patient History     Smoking Tobacco Use: Former     Smokeless Tobacco Use: Never     Passive Exposure: Past      Illicit Drugs:   Social History     Substance and Sexual Activity   Drug Use Not Currently         Family History:    Family History   Problem Relation Name Age of Onset    Diabetes Father      Gout Father      Sickle cell trait Father      Seizures Sister      Diabetes Other      Uterine cancer Other      Other (congenital blindness) Cousin         OBJECTIVE     Vitals: I/O:   There were no vitals filed for this visit.     24hr Min/Max:  No data recorded No intake or output data in the 24 hours ending 03/02/25 1442     Net IO Since Admission: No IO data has been entered for this period [03/02/25 1442]      Physical Exam  General: well-appearing, NAD, sitting upright  Neuro: AOx4, cooperative, moves all extremities spontaneously. CN II-XII intact. UE and LE strength 5/5 bilaterally and sensation intact.  HEENT: NC/AT. PERRL. EOMI. MMM. No oral exudate.  Respiratory: CTA bilaterally. Normal respiratory effort. On RA  CV: RRR. No murmurs. Radial pulses 2+. Trace LE edema bilaterally.  Abdomen: Soft, ND, mild tenderness to palpation in b/l lower quadrants.   Skin: Warm, dry. No obvious rashes or wounds.  Psych: Appropriate mood and affect.      LABS:  CBC RFP   Lab Results   Component  Value Date    WBC 11.1 02/28/2025    HGB 10.6 (L) 02/28/2025    HCT 31.1 (L) 02/28/2025    MCV 77 (L) 02/28/2025     02/28/2025    NEUTROABS 7.58 02/28/2025    Lab Results   Component Value Date     02/28/2025    K 4.7 02/28/2025     02/28/2025    CO2 32 02/28/2025    BUN 11 02/28/2025    CREATININE 1.11 (H) 02/28/2025    CREATININE 0.85 01/24/2025     Lab Results   Component Value Date    MG 2.05 11/03/2024    PHOS 3.8 12/30/2024    CALCIUM 8.7 02/28/2025    ALBUMINELP 3.9 02/16/2022         Hepatic Function ABG/VBG   Lab Results   Component Value Date    ALT 8 02/28/2025    AST 14 02/28/2025    ALKPHOS 124 (H) 02/28/2025     Lab Results   Component Value Date    BILIDIR 0.8 (H) 11/03/2024      Lab Results   Component Value Date    PROTIME 18.5 (H) 02/28/2025    APTT 40 (H) 01/24/2025    INR 1.7 (H) 02/28/2025    ALBUMINELP 3.9 02/16/2022    Lab Results   Component Value Date    LACTATE 0.9 10/02/2024            Imaging:   === 01/30/25 ===    BI US BREAST LIMITED BILATERAL    - Impression -  1. Interval resolution of left breast diffuse skin and parenchymal  edema, consistent with history of interval sickle cell treatment.  2. Left breast probably benign large mass at 1:00 position, most  likely representing fat necrosis. Short-term mammographic follow-up  in 6 months is recommended. Biopsy is no longer indicated.  3. Left breast probably benign small mass at 11:00 position 10 cm  from the nipple, which may represent a complicated cyst or  fibroadenoma. Short-term ultrasound follow-up in 6 months is  recommend. Biopsy is no longer indicated.  4. Bilateral probably benign prominent lymph nodes, likely reactive  in nature. Clinical correlation is recommended. Ultrasound follow-up  in 6 months is recommended. Biopsy is no longer indicated at this  point.    Findings and recommendations were discussed with the patient.    BI-RADS CATEGORY:    BI-RADS Category:  3 Probably Benign.  Recommendation:   Short-term Interval Follow-up Imaging.  Recommended Date:  6 Months.  Laterality:  Bilateral.    For any future breast imaging appointments, please call [phone]).      MACRO:  None    Signed by: Debbie Magallon 1/30/2025 7:35 PM  Dictation workstation:   [id number]  === 09/10/24 ===    CT ANKLE LEFT W IV CONTRAST    - Impression -  1. Diffuse nonspecific edema of the distal lower extremity and ankle  without focal fluid collection or abnormal enhancement. Findings may  represent vascular congestion, and cellulitis is felt less likely  with the lack of enhancement but is not entirely excluded. No acute  osseous findings.  2. No osseous manifestations of avascular necrosis within the  limitations of CT modality.  3. Mild degenerative changes of the mid and hindfoot.      MACRO:  None    Signed by: Vivek Ji 10/14/2024 11:09 AM  Dictation workstation:   [id number]  === 07/17/21 ===    - Impression -  Partial tear origin medial collateral ligament.    Mild medial compartment osteoarthritis.      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 IR consulted placed for apheresis line placement tomorrow. Started on heparin ggt; warfarin not restarted on admission. Confirmed 3/3 exchange appointment with transfusion medicine. She presents with pain consistent with her sickle cell pain crisis over the past month. Admitted for further management. Started on IV dilaudid for pain management. Patient also presents with painless L eye vision loss w/floaters and blurriness is most concerning for a  retinal detachment and acute retinopathy in the setting of SCD. Ophthalmology emergently consulted for evaluation, appreciate recs. CTH wo con ordered to rule out hemorrhagic stroke. MRI brain ordered to evaluate for acute ischemic stroke. She is outside the window for tpa therapy (>4.5 hrs).  Optimize vascular risk factors.    # Painless vision loss in left eye, c/f acute retinal detachment, SCD-related retinopathy  -STAT consulted ophthalmology, appreciate recs  -STAT CTH wo con ordered to rule out hemorrhagic stroke  -MRI brain w/wo con ordered to evaluate for acute ischemic stroke    # Hb SC disease   #Acute on chronic SCD related pain crisis  - currently on q6 week RBC exchange transfusions (most recent 1/20/25); planned for 3/3/25  - [facility] reviewed, last updated 7/25/24  - [place] reviewed, no aberrant behavior noted (last filled oxycodone 2/25 x12 days qty 75 tabs)  - Hemolysis labs are at baseline, pre-exchange labs obtained 2/28/25  - Hgb baseline ~10  - LDH baseline ~200  - Tbili baseline ~1.4  - Hgb S: pre-exchange 36.1%  -Refractory to home oxycodone 10 mg  PLAN  - Planned for exchange therapy tomorrow 3/3; confirmed w/transfusion medicine  - IR consulted for line placement prior to exchange therapy  - start IV dilaudid 1 mg q3h prn for severe pain  - start IV dilaudid 0.5 mg q2h for moderate pain  - Cyclobenzaprine 10 mg every 8 hours prn   - Bowel regimen for opioid induced constipation with Senna 2tabs BID and Miralax daily  - Can consider PO Benadryl 25 mg q6h PRN for opioid-induced pruritus  - PO Zofran 8 mg q8h PRN for opioid-induced nausea  - continue 2L night time oxygen   - c/w home folic acid 1 mg daily  - Utox: ordered     # L Breast Mass, s/p bx 1/31, low suspicion for malignancy  - Established with [facility], s/p bx, follow up in 6 months (scheduled for 8/2025)     #DVT, PE  # SVC thrombus  - Start heparin ggt perioperatively for line placement  - Once line placement is completed, can  bridge to home warfarin w/goal INR 2-3     #hx SVT  # pHTN  - c/w home metop 25 mg BID and Lasix 20 every other day   - telemetry   - EKG ordered      #DISPO  - Full code - confirmed on admit  - DC home pending completed hb exchange and pain control and L eye vision evaluation  - SURROGATE DECISION MAKER: Daughter Tati (mom) [phone]; pt also has a daughter.  -Sickle cell FUV 3/10 w/ Dr. Whaley, Cardiology 3/12    Not yet staffed with the attending.    Jorge Wells  PGY-2 Internal Medicine

## 2025-03-03 ENCOUNTER — APPOINTMENT (OUTPATIENT)
Dept: RADIOLOGY | Facility: HOSPITAL | Age: 55
End: 2025-03-03
Payer: COMMERCIAL

## 2025-03-03 ENCOUNTER — APPOINTMENT (OUTPATIENT)
Dept: OTHER | Facility: HOSPITAL | Age: 55
DRG: 987 | End: 2025-03-03
Payer: COMMERCIAL

## 2025-03-03 ENCOUNTER — HOSPITAL ENCOUNTER (OUTPATIENT)
Dept: RADIOLOGY | Facility: HOSPITAL | Age: 55
Discharge: HOME | End: 2025-03-03
Payer: COMMERCIAL

## 2025-03-03 ENCOUNTER — APPOINTMENT (OUTPATIENT)
Dept: RADIOLOGY | Facility: HOSPITAL | Age: 55
DRG: 987 | End: 2025-03-03
Payer: COMMERCIAL

## 2025-03-03 DIAGNOSIS — Z92.89 HISTORY OF EXCHANGE TRANSFUSION: ICD-10-CM

## 2025-03-03 DIAGNOSIS — G89.4 CHRONIC PAIN SYNDROME: ICD-10-CM

## 2025-03-03 DIAGNOSIS — D57.219 SICKLE-CELL-HEMOGLOBIN C DISEASE WITH CRISIS (MULTI): ICD-10-CM

## 2025-03-03 LAB
ABO GROUP (TYPE) IN BLOOD: NORMAL
ALBUMIN SERPL BCP-MCNC: 3.6 G/DL (ref 3.4–5)
ALP SERPL-CCNC: 130 U/L (ref 33–110)
ALT SERPL W P-5'-P-CCNC: 11 U/L (ref 7–45)
ANION GAP SERPL CALC-SCNC: 12 MMOL/L (ref 10–20)
ANTIBODY SCREEN: NORMAL
AST SERPL W P-5'-P-CCNC: 15 U/L (ref 9–39)
BASOPHILS # BLD MANUAL: 0.16 X10*3/UL (ref 0–0.1)
BASOPHILS NFR BLD MANUAL: 1.7 %
BILIRUB SERPL-MCNC: 2.1 MG/DL (ref 0–1.2)
BLOOD EXPIRATION DATE: NORMAL
BUN SERPL-MCNC: 9 MG/DL (ref 6–23)
CALCIUM SERPL-MCNC: 8.8 MG/DL (ref 8.6–10.6)
CHLORIDE SERPL-SCNC: 99 MMOL/L (ref 98–107)
CO2 SERPL-SCNC: 31 MMOL/L (ref 21–32)
CREAT SERPL-MCNC: 1.03 MG/DL (ref 0.5–1.05)
DISPENSE STATUS: NORMAL
EGFRCR SERPLBLD CKD-EPI 2021: 64 ML/MIN/1.73M*2
EOSINOPHIL # BLD MANUAL: 0.25 X10*3/UL (ref 0–0.7)
EOSINOPHIL NFR BLD MANUAL: 2.6 %
ERYTHROCYTE [DISTWIDTH] IN BLOOD BY AUTOMATED COUNT: 26.5 % (ref 11.5–14.5)
GLUCOSE SERPL-MCNC: 88 MG/DL (ref 74–99)
HCT VFR BLD AUTO: 31.7 % (ref 36–46)
HGB BLD-MCNC: 10.3 G/DL (ref 12–16)
HGB RETIC QN: 30 PG (ref 28–38)
HYPOCHROMIA BLD QL SMEAR: ABNORMAL
IMM GRANULOCYTES # BLD AUTO: 0.04 X10*3/UL (ref 0–0.7)
IMM GRANULOCYTES NFR BLD AUTO: 0.4 % (ref 0–0.9)
IMMATURE RETIC FRACTION: 46.1 %
LDH SERPL L TO P-CCNC: 244 U/L (ref 84–246)
LYMPHOCYTES # BLD MANUAL: 2.26 X10*3/UL (ref 1.2–4.8)
LYMPHOCYTES NFR BLD MANUAL: 23.5 %
MCH RBC QN AUTO: 25.8 PG (ref 26–34)
MCHC RBC AUTO-ENTMCNC: 32.5 G/DL (ref 32–36)
MCV RBC AUTO: 79 FL (ref 80–100)
MONOCYTES # BLD MANUAL: 1.33 X10*3/UL (ref 0.1–1)
MONOCYTES NFR BLD MANUAL: 13.9 %
NEUTS SEG # BLD MANUAL: 5.6 X10*3/UL (ref 1.2–7)
NEUTS SEG NFR BLD MANUAL: 58.3 %
NRBC BLD-RTO: 1 /100 WBCS (ref 0–0)
PLATELET # BLD AUTO: 332 X10*3/UL (ref 150–450)
POLYCHROMASIA BLD QL SMEAR: ABNORMAL
POTASSIUM SERPL-SCNC: 4.7 MMOL/L (ref 3.5–5.3)
PRODUCT BLOOD TYPE: 5100
PRODUCT BLOOD TYPE: 9500
PRODUCT CODE: NORMAL
PROT SERPL-MCNC: 7.1 G/DL (ref 6.4–8.2)
RBC # BLD AUTO: 3.99 X10*6/UL (ref 4–5.2)
RBC MORPH BLD: ABNORMAL
RETICS #: 0.04 X10*6/UL (ref 0.02–0.08)
RETICS/RBC NFR AUTO: 1 % (ref 0.5–2)
RH FACTOR (ANTIGEN D): NORMAL
SODIUM SERPL-SCNC: 137 MMOL/L (ref 136–145)
TARGETS BLD QL SMEAR: ABNORMAL
TOTAL CELLS COUNTED BLD: 115
UFH PPP CHRO-ACNC: 0.4 IU/ML
UFH PPP CHRO-ACNC: 0.4 IU/ML
UNIT ABO: NORMAL
UNIT NUMBER: NORMAL
UNIT RH: NORMAL
UNIT VOLUME: 282
UNIT VOLUME: 283
UNIT VOLUME: 350
WBC # BLD AUTO: 9.6 X10*3/UL (ref 4.4–11.3)
XM INTEP: NORMAL

## 2025-03-03 PROCEDURE — 2500000004 HC RX 250 GENERAL PHARMACY W/ HCPCS (ALT 636 FOR OP/ED): Performed by: RADIOLOGY

## 2025-03-03 PROCEDURE — 2500000004 HC RX 250 GENERAL PHARMACY W/ HCPCS (ALT 636 FOR OP/ED)

## 2025-03-03 PROCEDURE — 99152 MOD SED SAME PHYS/QHP 5/>YRS: CPT | Performed by: RADIOLOGY

## 2025-03-03 PROCEDURE — 86901 BLOOD TYPING SEROLOGIC RH(D): CPT

## 2025-03-03 PROCEDURE — 7100000009 HC PHASE TWO TIME - INITIAL BASE CHARGE

## 2025-03-03 PROCEDURE — 70551 MRI BRAIN STEM W/O DYE: CPT

## 2025-03-03 PROCEDURE — 77001 FLUOROGUIDE FOR VEIN DEVICE: CPT | Performed by: RADIOLOGY

## 2025-03-03 PROCEDURE — 36512 APHERESIS RBC: CPT

## 2025-03-03 PROCEDURE — 7100000010 HC PHASE TWO TIME - EACH INCREMENTAL 1 MINUTE

## 2025-03-03 PROCEDURE — C1752 CATH,HEMODIALYSIS,SHORT-TERM: HCPCS

## 2025-03-03 PROCEDURE — C1894 INTRO/SHEATH, NON-LASER: HCPCS

## 2025-03-03 PROCEDURE — 83615 LACTATE (LD) (LDH) ENZYME: CPT

## 2025-03-03 PROCEDURE — 2720000007 HC OR 272 NO HCPCS

## 2025-03-03 PROCEDURE — 84075 ASSAY ALKALINE PHOSPHATASE: CPT

## 2025-03-03 PROCEDURE — 2500000004 HC RX 250 GENERAL PHARMACY W/ HCPCS (ALT 636 FOR OP/ED): Mod: JZ,TB

## 2025-03-03 PROCEDURE — 1200000003 HC ONCOLOGY  ROOM WITH TELEMETRY DAILY

## 2025-03-03 PROCEDURE — 36556 INSERT NON-TUNNEL CV CATH: CPT | Performed by: RADIOLOGY

## 2025-03-03 PROCEDURE — 85045 AUTOMATED RETICULOCYTE COUNT: CPT

## 2025-03-03 PROCEDURE — 36415 COLL VENOUS BLD VENIPUNCTURE: CPT

## 2025-03-03 PROCEDURE — 36514 APHERESIS PLASMA: CPT

## 2025-03-03 PROCEDURE — 99233 SBSQ HOSP IP/OBS HIGH 50: CPT

## 2025-03-03 PROCEDURE — 99152 MOD SED SAME PHYS/QHP 5/>YRS: CPT

## 2025-03-03 PROCEDURE — 70551 MRI BRAIN STEM W/O DYE: CPT | Performed by: RADIOLOGY

## 2025-03-03 PROCEDURE — P9040 RBC LEUKOREDUCED IRRADIATED: HCPCS

## 2025-03-03 PROCEDURE — 02HV33Z INSERTION OF INFUSION DEVICE INTO SUPERIOR VENA CAVA, PERCUTANEOUS APPROACH: ICD-10-PCS | Performed by: RADIOLOGY

## 2025-03-03 PROCEDURE — 76937 US GUIDE VASCULAR ACCESS: CPT | Performed by: RADIOLOGY

## 2025-03-03 PROCEDURE — 85027 COMPLETE CBC AUTOMATED: CPT

## 2025-03-03 PROCEDURE — 2780000003 HC OR 278 NO HCPCS

## 2025-03-03 PROCEDURE — 85520 HEPARIN ASSAY: CPT

## 2025-03-03 PROCEDURE — 36512 APHERESIS RBC: CPT | Performed by: STUDENT IN AN ORGANIZED HEALTH CARE EDUCATION/TRAINING PROGRAM

## 2025-03-03 PROCEDURE — 2500000001 HC RX 250 WO HCPCS SELF ADMINISTERED DRUGS (ALT 637 FOR MEDICARE OP)

## 2025-03-03 PROCEDURE — 85007 BL SMEAR W/DIFF WBC COUNT: CPT

## 2025-03-03 PROCEDURE — C1769 GUIDE WIRE: HCPCS

## 2025-03-03 RX ORDER — DIPHENHYDRAMINE HCL 25 MG
25 CAPSULE ORAL ONCE
Status: COMPLETED | OUTPATIENT
Start: 2025-03-03 | End: 2025-03-03

## 2025-03-03 RX ORDER — HEPARIN SODIUM 1000 [USP'U]/ML
1800 INJECTION, SOLUTION INTRAVENOUS; SUBCUTANEOUS ONCE
Status: COMPLETED | OUTPATIENT
Start: 2025-03-03 | End: 2025-03-03

## 2025-03-03 RX ORDER — FENTANYL CITRATE 50 UG/ML
INJECTION, SOLUTION INTRAMUSCULAR; INTRAVENOUS
Status: COMPLETED | OUTPATIENT
Start: 2025-03-03 | End: 2025-03-03

## 2025-03-03 RX ORDER — DIPHENHYDRAMINE HYDROCHLORIDE 50 MG/ML
25 INJECTION, SOLUTION INTRAMUSCULAR; INTRAVENOUS EVERY 5 MIN PRN
Status: DISCONTINUED | OUTPATIENT
Start: 2025-03-03 | End: 2025-03-03 | Stop reason: HOSPADM

## 2025-03-03 RX ORDER — CALCIUM CARBONATE 200(500)MG
1500 TABLET,CHEWABLE ORAL EVERY 5 MIN PRN
Status: DISCONTINUED | OUTPATIENT
Start: 2025-03-03 | End: 2025-03-03 | Stop reason: HOSPADM

## 2025-03-03 RX ORDER — CALCIUM GLUCONATE 20 MG/ML
INJECTION, SOLUTION INTRAVENOUS ONCE
Status: CANCELLED | OUTPATIENT
Start: 2025-03-03 | End: 2025-03-03

## 2025-03-03 RX ORDER — HEPARIN SODIUM 10000 [USP'U]/100ML
0-4500 INJECTION, SOLUTION INTRAVENOUS CONTINUOUS
Status: DISCONTINUED | OUTPATIENT
Start: 2025-03-03 | End: 2025-03-03

## 2025-03-03 RX ORDER — MIDAZOLAM HYDROCHLORIDE 1 MG/ML
INJECTION INTRAMUSCULAR; INTRAVENOUS
Status: COMPLETED | OUTPATIENT
Start: 2025-03-03 | End: 2025-03-03

## 2025-03-03 RX ORDER — HEPARIN SODIUM 10000 [USP'U]/100ML
0-4000 INJECTION, SOLUTION INTRAVENOUS CONTINUOUS
Status: DISPENSED | OUTPATIENT
Start: 2025-03-03 | End: 2025-03-04

## 2025-03-03 RX ORDER — ACETAMINOPHEN 325 MG/1
650 TABLET ORAL ONCE
Status: COMPLETED | OUTPATIENT
Start: 2025-03-03 | End: 2025-03-03

## 2025-03-03 RX ADMIN — HYDROMORPHONE HYDROCHLORIDE 1 MG: 1 INJECTION, SOLUTION INTRAMUSCULAR; INTRAVENOUS; SUBCUTANEOUS at 02:21

## 2025-03-03 RX ADMIN — ACETAMINOPHEN 650 MG: 325 TABLET ORAL at 14:25

## 2025-03-03 RX ADMIN — FOLIC ACID 1 MG: 1 TABLET ORAL at 08:23

## 2025-03-03 RX ADMIN — CALCIUM GLUCONATE 25 ML/HR: 20 INJECTION, SOLUTION INTRAVENOUS at 14:35

## 2025-03-03 RX ADMIN — HYDROMORPHONE HYDROCHLORIDE 1 MG: 1 INJECTION, SOLUTION INTRAMUSCULAR; INTRAVENOUS; SUBCUTANEOUS at 09:26

## 2025-03-03 RX ADMIN — HYDROMORPHONE HYDROCHLORIDE 0.5 MG: 1 INJECTION, SOLUTION INTRAMUSCULAR; INTRAVENOUS; SUBCUTANEOUS at 11:53

## 2025-03-03 RX ADMIN — CETIRIZINE HYDROCHLORIDE 10 MG: 10 TABLET, FILM COATED ORAL at 08:23

## 2025-03-03 RX ADMIN — HYDROMORPHONE HYDROCHLORIDE 1 MG: 1 INJECTION, SOLUTION INTRAMUSCULAR; INTRAVENOUS; SUBCUTANEOUS at 05:54

## 2025-03-03 RX ADMIN — DIPHENHYDRAMINE HYDROCHLORIDE 25 MG: 25 CAPSULE ORAL at 14:25

## 2025-03-03 RX ADMIN — HYDROMORPHONE HYDROCHLORIDE 1 MG: 1 INJECTION, SOLUTION INTRAMUSCULAR; INTRAVENOUS; SUBCUTANEOUS at 12:51

## 2025-03-03 RX ADMIN — METOPROLOL TARTRATE 25 MG: 25 TABLET, FILM COATED ORAL at 20:03

## 2025-03-03 RX ADMIN — HEPARIN SODIUM 1150 UNITS/HR: 10000 INJECTION, SOLUTION INTRAVENOUS at 13:01

## 2025-03-03 RX ADMIN — HEPARIN SODIUM 1800 UNITS: 1000 INJECTION INTRAVENOUS; SUBCUTANEOUS at 16:35

## 2025-03-03 RX ADMIN — HEPARIN SODIUM 1150 UNITS/HR: 10000 INJECTION, SOLUTION INTRAVENOUS at 23:48

## 2025-03-03 RX ADMIN — FENTANYL CITRATE 50 MCG: 50 INJECTION, SOLUTION INTRAMUSCULAR; INTRAVENOUS at 11:00

## 2025-03-03 RX ADMIN — MIDAZOLAM HYDROCHLORIDE 1 MG: 1 INJECTION, SOLUTION INTRAMUSCULAR; INTRAVENOUS at 11:00

## 2025-03-03 RX ADMIN — HYDROMORPHONE HYDROCHLORIDE 1 MG: 1 INJECTION, SOLUTION INTRAMUSCULAR; INTRAVENOUS; SUBCUTANEOUS at 20:00

## 2025-03-03 RX ADMIN — ATROPINE SULFATE 1 DROP: 10 SOLUTION/ DROPS OPHTHALMIC at 20:03

## 2025-03-03 RX ADMIN — HYDROMORPHONE HYDROCHLORIDE 0.5 MG: 1 INJECTION, SOLUTION INTRAMUSCULAR; INTRAVENOUS; SUBCUTANEOUS at 08:22

## 2025-03-03 RX ADMIN — HYDROMORPHONE HYDROCHLORIDE 0.5 MG: 1 INJECTION, SOLUTION INTRAMUSCULAR; INTRAVENOUS; SUBCUTANEOUS at 15:25

## 2025-03-03 RX ADMIN — HYDROMORPHONE HYDROCHLORIDE 0.5 MG: 1 INJECTION, SOLUTION INTRAMUSCULAR; INTRAVENOUS; SUBCUTANEOUS at 21:35

## 2025-03-03 RX ADMIN — HYDROMORPHONE HYDROCHLORIDE 1 MG: 1 INJECTION, SOLUTION INTRAMUSCULAR; INTRAVENOUS; SUBCUTANEOUS at 23:05

## 2025-03-03 RX ADMIN — METOPROLOL TARTRATE 25 MG: 25 TABLET, FILM COATED ORAL at 08:22

## 2025-03-03 RX ADMIN — MIDAZOLAM HYDROCHLORIDE 1 MG: 1 INJECTION, SOLUTION INTRAMUSCULAR; INTRAVENOUS at 10:55

## 2025-03-03 RX ADMIN — FENTANYL CITRATE 50 MCG: 50 INJECTION, SOLUTION INTRAMUSCULAR; INTRAVENOUS at 10:55

## 2025-03-03 RX ADMIN — FUROSEMIDE 20 MG: 20 TABLET ORAL at 08:23

## 2025-03-03 RX ADMIN — HYDROMORPHONE HYDROCHLORIDE 1 MG: 1 INJECTION, SOLUTION INTRAMUSCULAR; INTRAVENOUS; SUBCUTANEOUS at 16:59

## 2025-03-03 ASSESSMENT — PAIN - FUNCTIONAL ASSESSMENT

## 2025-03-03 ASSESSMENT — PAIN SCALES - GENERAL
PAINLEVEL_OUTOF10: 8
PAINLEVEL_OUTOF10: 6
PAINLEVEL_OUTOF10: 8
PAINLEVEL_OUTOF10: 8
PAINLEVEL_OUTOF10: 0 - NO PAIN
PAINLEVEL_OUTOF10: 5 - MODERATE PAIN
PAINLEVEL_OUTOF10: 7
PAINLEVEL_OUTOF10: 8
PAINLEVEL_OUTOF10: 7
PAINLEVEL_OUTOF10: 8
PAINLEVEL_OUTOF10: 7
PAINLEVEL_OUTOF10: 9
PAINLEVEL_OUTOF10: 7
PAINLEVEL_OUTOF10: 7
PAINLEVEL_OUTOF10: 6
PAINLEVEL_OUTOF10: 8
PAINLEVEL_OUTOF10: 0 - NO PAIN
PAINLEVEL_OUTOF10: 5 - MODERATE PAIN
PAINLEVEL_OUTOF10: 8
PAINLEVEL_OUTOF10: 8
PAINLEVEL_OUTOF10: 0 - NO PAIN
PAINLEVEL_OUTOF10: 7

## 2025-03-03 ASSESSMENT — COGNITIVE AND FUNCTIONAL STATUS - GENERAL
MOBILITY SCORE: 24
DAILY ACTIVITIY SCORE: 24

## 2025-03-03 ASSESSMENT — PAIN DESCRIPTION - LOCATION
LOCATION: GENERALIZED
LOCATION: GENERALIZED

## 2025-03-03 ASSESSMENT — PAIN SCALES - PAIN ASSESSMENT IN ADVANCED DEMENTIA (PAINAD)
TOTALSCORE: MEDICATION (SEE MAR)
TOTALSCORE: MEDICATION (SEE MAR)

## 2025-03-03 NOTE — CONSULTS
Reason For Consult  Acute vision loss left eye    History Of Present Illness  Senait Narvaez is a 55 y.o. female with  PMHx of Hgb SC with prior plasma exchanges (now admitted for plasma exchange) and POHx  of PSR with prior panretinal photocoagulation (PRP) OU, hx of RRD OD status post (s/p) scleral buckling procedure (SB) (1980s), ophthalmology consulted due to concern for acute vision loss OS.      Patient reports she woke up this morning with floaters and very low vision OS. She feels like something is moving around in her eye. No flashes. Vision was normal when she went to bed last night. No eye pain. Also reports hx of prior lasers and injections to eyes, says it has been quite awhile since she has seen eye doctor (per chart review, last note from 2013).     Past Medical History  She has a past medical history of Asthma, CHF (congestive heart failure), Chronic pain disorder, Compression of vein (02/19/2020), and Hypotension, unspecified (12/10/2020).    Physical Exam  Base Eye Exam       Visual Acuity (Snellen - Linear)         Right Left    Near cc 20/25-2 PHNI 20/400 PHNI      Correction: Glasses              Tonometry (Tonopen, 3:24 PM)         Right Left    Pressure 17 18              Pupils         Dark Light Shape React APD    Right 4 3 Round Slow -    Left 4 3 Round Slow -              Visual Fields (Counting fingers)         Left Right      Full                                Extraocular Movement         Right Left     Full, Ortho Full, Ortho              Neuro/Psych       Oriented x3: Yes    Mood/Affect: Normal              Dilation       Both eyes: 1% Mydriacyl & 2.5% Raffaele  @ 3:25 PM                  Additional Tests       Color         Right Left    Ishihara 5/12 0/12                  Slit Lamp and Fundus Exam       External Exam         Right Left    External Normal Normal              Slit Lamp Exam         Right Left    Lids/Lashes Good Position Good Position    Conjunctiva/Sclera White and quiet,  "button hole at 2:00 White and quiet    Cornea Clear Clear    Anterior Chamber Deep and quiet Deep and quiet    Iris Round and reactive Round and reactive    Lens Clear Clear    Anterior Vitreous Normal VH              Fundus Exam         Right Left    Disc Pink and sharp, no active NVD, residual fibrotic remnant attached to nerve     C/D Ratio .3     Macula Normal     Vessels Normal     Periphery 360 PRP     Poor view OS 2/2 vitreous heme, both scattered and focal areas. Temporally there is area concerning for potential tear/detachment, although again poor view. Vessels and disc not well visualized. Faint views of panretinal photocoagulation (PRP) through VH.                      Last Recorded Vitals  Blood pressure 136/69, pulse 78, temperature 36.8 °C (98.2 °F), temperature source Temporal, resp. rate 18, height 1.651 m (5' 5\"), weight 95.7 kg (210 lb 15.7 oz), SpO2 95%.    Relevant Results  Results for orders placed or performed during the hospital encounter of 03/02/25 (from the past 24 hours)   aPTT - baseline   Result Value Ref Range    aPTT 19 (L) 26 - 36 seconds     *Note: Due to a large number of results and/or encounters for the requested time period, some results have not been displayed. A complete set of results can be found in Results Review.         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   - Recommend elevated head of bed, avoid heavy bending or lifting  - Avoid NSAIDs if able. Continue medically necessary blood thinners.   - START atropine eye drops at bedtime left eye  - Ophthalmology will re-evaluate daily as blood settles.    Patient discussed with retina attending Dr. Biggs.    Aramis Peraza MD  PGY2 - Ophthalmology     Ophthalmology Adult Pager - [phone]  Ophthalmology Pediatrics Pager - [phone]    For adult follow-up appointments, call: [phone]  For pediatric follow-up " appointments, call: [phone]    NOTE: This note is not finalized until attending reviews and signs.

## 2025-03-03 NOTE — CARE PLAN
Problem: Pain - Adult  Goal: Verbalizes/displays adequate comfort level or baseline comfort level  3/2/2025 2258 by Gloria Angeles RN  Outcome: Progressing  3/2/2025 2257 by Gloria Angeles RN  Outcome: Progressing     Problem: Safety - Adult  Goal: Free from fall injury  3/2/2025 2258 by Gloria Angeles RN  Outcome: Progressing  3/2/2025 2257 by Gloria Angeles RN  Outcome: Progressing     Problem: Discharge Planning  Goal: Discharge to home or other facility with appropriate resources  3/2/2025 2258 by Gloria Angeles RN  Outcome: Progressing  3/2/2025 2257 by Gloria Angeles RN  Outcome: Progressing     Problem: Chronic Conditions and Co-morbidities  Goal: Patient's chronic conditions and co-morbidity symptoms are monitored and maintained or improved  3/2/2025 2258 by Gloria Angeles RN  Outcome: Progressing  3/2/2025 2257 by Gloria Angeles RN  Outcome: Progressing   The patient's goals for the shift include      The clinical goals for the shift include Patient free from fall    Over the shift, the patient did not make progress toward the following goals. Barriers to progression include ***. Recommendations to address these barriers include ***.

## 2025-03-03 NOTE — PROGRESS NOTES
"Senait Narvaez is a 55 y.o. female on day 1 of admission presenting with Cardiomyopathy, ischemic.    Subjective   Patient seen at bedside. States that her vision is unchanged from yesterday. States that she continues to have blurred vision/floaters to the point that she has almost no vision in her left eye. Discussed plan for MRI brain. States that her pain is about 7/10 and generalized. Denies any new SOB, CP, heart palpitations, N/V/D/C, headache or dizziness. Denies any other neurological symptoms.        Objective     Physical Exam  HENT:      Head: Normocephalic.      Nose: Nose normal.      Mouth/Throat:      Mouth: Mucous membranes are moist.   Eyes:      Pupils: Pupils are equal, round, and reactive to light.      Comments: Decreased vision     Cardiovascular:      Rate and Rhythm: Normal rate.   Pulmonary:      Effort: Pulmonary effort is normal.   Abdominal:      Palpations: Abdomen is soft.   Musculoskeletal:         General: Normal range of motion.      Cervical back: Normal range of motion.   Skin:     General: Skin is warm.      Capillary Refill: Capillary refill takes less than 2 seconds.   Neurological:      General: No focal deficit present.      Mental Status: She is alert.   Psychiatric:         Mood and Affect: Mood normal.         Behavior: Behavior normal.         Last Recorded Vitals  Blood pressure 136/74, pulse 64, temperature 36.6 °C (97.9 °F), temperature source Temporal, resp. rate 18, height 1.651 m (5' 5\"), weight 95.7 kg (210 lb 15.7 oz), SpO2 95%.  Intake/Output last 3 Shifts:  No intake/output data recorded.    Relevant Results               This patient has a central line   Reason for the central line remaining today? 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 IR consulted and placed apheresis line 3/3. Started on heparin ggt; warfarin not restarted on admission. Confirmed 3/3 exchange appointment with transfusion medicine. She presents with pain consistent with her sickle cell pain crisis over the past month. Admitted for further management. Started on IV dilaudid for pain management. Patient also presents with painless L eye vision loss w/floaters and blurriness is most concerning for a retinal detachment and acute retinopathy in the setting of SCD. Ophthalmology emergently consulted for evaluation concerned for vitreous hemorrhage possible retinol detachment from sickle retinopathy. Started on atropine eye drops. Awaiting final recs from ophtho. CTH wo con ordered to rule out hemorrhagic stroke. No acute findings. MRI brain ordered to evaluate for acute ischemic stroke. Chronic sickle cell changes present but no acute findings. 3/3 apheresis      # Painless vision loss in left eye, c/f acute retinal detachment, SCD-related retinopathy  -STAT consulted ophthalmology likely vitreous hemorrhage possible retinol detachment from sickle retinopathy.   -Started on atropine eye drops 3/2  -STAT CTH wo neg for acute findings   -MRI brain w/wo con showing chronic changes with no acute findings   -Continue heparin gtt until final ophtho plan for possible interventions      # Hb SC disease   #Acute on chronic SCD related pain crisis  - currently on q6 week RBC exchange transfusions (most recent 1/20/25); planned for 3/3/25  - [facility] reviewed, last updated 7/25/24  - [place] reviewed, no aberrant behavior noted (last filled oxycodone 2/25 x12 days qty 75 tabs)  - Hemolysis labs are at  baseline, pre-exchange labs obtained 2/28/25  - Hgb baseline ~10  - LDH baseline ~200  - Tbili baseline ~1.4  - Hgb S: pre-exchange 36.1%  -Refractory to home oxycodone 10 mg  PLAN  - Planned for exchange therapy 3/3; confirmed w/transfusion medicine  - Apheresis line placed 3/3  - start IV dilaudid 1 mg q3h prn for severe pain  - start IV dilaudid 0.5 mg q2h for moderate pain  - Cyclobenzaprine 10 mg every 8 hours prn   - Bowel regimen for opioid induced constipation with Senna 2tabs BID and Miralax daily  - Can consider PO Benadryl 25 mg q6h PRN for opioid-induced pruritus  - PO Zofran 8 mg q8h PRN for opioid-induced nausea  - continue 2L night time oxygen   - c/w home folic acid 1 mg daily  - Utox: ordered     # L Breast Mass, s/p bx 1/31, low suspicion for malignancy  - Established with [facility], s/p bx, follow up in 6 months (scheduled for 8/2025)     #DVT, PE  # SVC thrombus  - Start heparin ggt perioperatively for line placement  - Once final ophtho plan complete can bridge to home warfarin w/goal INR 2-3     #hx SVT  # pHTN  - c/w home metop 25 mg BID and Lasix 20 every other day   - telemetry   - EKG ordered      #DISPO  - Full code - confirmed on admit  - DC home pending completed hb exchange and pain control and L eye vision evaluation  - SURROGATE DECISION MAKER: Daughter Tati (mom) [phone]; pt also has a daughter.  -Sickle cell FUV 3/10 w/ Dr. Whaley, Cardiology 3/12       I spent 60 minutes in the professional and overall care of this patient.      Rach Saleh, APRN-CNP  Patient discussed with Dr. Hensley

## 2025-03-03 NOTE — POST-PROCEDURE NOTE
This is a 55 y.o. female with Sickle Cell Disease currently on the chronic exchange program who underwent automated red blood cell exchange (RCE) with 7 RBC units with target HgbS 29% and target HCT 28%.     I saw and evaluated the patient during the RCE.  The patient was resting in bed.  Vascular access functioned well.    WBC   Date/Time Value Ref Range Status   03/03/2025 01:05 PM 9.6 4.4 - 11.3 x10*3/uL Preliminary     Hemoglobin   Date/Time Value Ref Range Status   03/03/2025 01:05 PM 10.3 (L) 12.0 - 16.0 g/dL Preliminary     Hematocrit   Date/Time Value Ref Range Status   03/03/2025 01:05 PM 31.7 (L) 36.0 - 46.0 % Preliminary     Platelets   Date/Time Value Ref Range Status   03/03/2025 01:05  150 - 450 x10*3/uL Preliminary      POCT Calcium, Ionized   Date/Time Value Ref Range Status   02/28/2025 09:05 AM 1.23 1.1 - 1.33 mmol/L Final     Comment:     The performance characteristics of ionized calcium tested  in heparinized plasma or serum have been validated by the  individual  laboratory site where testing is performed.   Testing on heparinized plasma or serum is not approved by   the FDA; however, such approval is not necessary.      Hemoglobin S   Date/Time Value Ref Range Status   02/28/2025 09:05 AM 36.1 (H) <=0.0 % Final      Ferritin   Date/Time Value Ref Range Status   02/28/2025 09:05 AM 31 8 - 150 ng/mL Final      Pre-procedure vital signs at 1432:  T: 36.4 C, HR: 59, RR: 18, BP: 119/68  Pulse oximetry: 96% on room air    Post-procedure vital signs at 1629:  T: 36.3 C, HR: 61, RR: 16, BP: 106/64    Outcome: RCE completed.  Adverse Reaction: No    Assessment and Plan:  55 y.o. female with Sickle Cell Disease who underwent RCE as part of the chronic exchange program.    Draw post-procedure labs.  Vascular access to be managed by the clinical team.  Next procedure to be scheduled by the apheresis center in coordination with primary outpatient hematology team.  Tentative next RCE in 4-6  weeks.

## 2025-03-03 NOTE — PROGRESS NOTES
Senait Narvaez [id number]       Procedure type: Red cell Exchange    Replacement Fluids:  7units of PRBC used for procedure (RBCX)     Procedure Completed Without complications.    Attending notified of completion status. See flow sheet(s) for additional details.  Post-Vitals listed below.    Post-procedure vitals:  Vitals  BP: 106/64  Temp: 36.3 °C (97.3 °F)  Heart Rate: 61  Resp: 16  SpO2: 100 % on 2L O2 via nasal cannula         Victor Manuel Escalante RN

## 2025-03-03 NOTE — CARE PLAN
Problem: Pain - Adult  Goal: Verbalizes/displays adequate comfort level or baseline comfort level  Outcome: Progressing     Problem: Safety - Adult  Goal: Free from fall injury  Outcome: Progressing     Problem: Discharge Planning  Goal: Discharge to home or other facility with appropriate resources  Outcome: Progressing     Problem: Chronic Conditions and Co-morbidities  Goal: Patient's chronic conditions and co-morbidity symptoms are monitored and maintained or improved  Outcome: Progressing   The patient's goals for the shift include      The clinical goals for the shift include Patient free from fall

## 2025-03-03 NOTE — PRE-PROCEDURE NOTE
Interventional Radiology Preprocedure Note    Indication for procedure: The encounter diagnosis was Sickle cell anemia with pain (Multi). Need for nontunneled central venous catheter for red cell exchange transfusion.    Relevant review of systems: NA    Relevant Labs:   Lab Results   Component Value Date    CREATININE 1.17 (H) 03/02/2025    EGFR 55 (L) 03/02/2025    INR 1.3 (H) 03/02/2025    PROTIME 14.8 (H) 03/02/2025       Planned Sedation/Anesthesia: Moderate    Airway assessment: normal    Directed physical examination:    Physical Exam  HENT:      Head: Normocephalic.   Eyes:      Pupils: Pupils are equal, round, and reactive to light.   Pulmonary:      Effort: Pulmonary effort is normal.   Musculoskeletal:      Cervical back: Normal range of motion.   Skin:     General: Skin is warm and dry.   Neurological:      Mental Status: She is alert.         ASA Score: ASA 2 - Patient with mild systemic disease with no functional limitations    Benefits, risks and alternatives of procedure and planned sedation have been discussed with the patient and/or their representative. All questions answered and they agree to proceed.

## 2025-03-03 NOTE — PROGRESS NOTES
Spiritual Care Visit  Spiritual Care Request    Reason for Visit:  Routine Visit: Follow-up  Continue Visiting: Yes     Focus of Care:  Visited With: Patient and family together         Spiritual Care Assessment    Care Provided:  Intended Effects: Build relationship of care and support, Demonstrate caring and concern, Tanvi affirmation  Methods: Offer support  Interventions: Acknowledge current situation, Active listening, Discuss spirituality/Orthodoxy with someone, [place]    Sense of Community and or Moravian Affiliation:  Zoroastrian      Spiritual Care Annotation    Annotation:   reintroduced self and role to patient Senait Narvaez and her mother. They welcomed visit from facility [facility]. Hand  was provided to them before and after visit. Mother updated  on how patient is doing and what's going on this admission. She reflected on patient's difficult admission in September, and expressed how her tanvi supported her during that time. Patient expressed the deep tanvi she has and the strength prayer provides her.  provided care through reflective listening, tanvi affirmation, supportive conversation, and prayer. Patient and mother were appreciative of visit and did not have any further needs at this time. Spiritual Care remains available as needed/requested.    Rev. Anita Pandya MDiv, BCC

## 2025-03-03 NOTE — CARE PLAN
Problem: Pain - Adult  Goal: Verbalizes/displays adequate comfort level or baseline comfort level  3/2/2025 2258 by Gloria Angeles RN  Outcome: Progressing  3/2/2025 2257 by Gloria Angeles RN  Outcome: Progressing     Problem: Safety - Adult  Goal: Free from fall injury  3/2/2025 2258 by Gloria Angeles RN  Outcome: Progressing  3/2/2025 2257 by Gloria Angeles RN  Outcome: Progressing     Problem: Discharge Planning  Goal: Discharge to home or other facility with appropriate resources  3/2/2025 2258 by Gloria Angeles RN  Outcome: Progressing  3/2/2025 2257 by Gloria Angeles RN  Outcome: Progressing     Problem: Chronic Conditions and Co-morbidities  Goal: Patient's chronic conditions and co-morbidity symptoms are monitored and maintained or improved  3/2/2025 2258 by Gloria Angeles RN  Outcome: Progressing  3/2/2025 2257 by Gloria Angeles RN  Outcome: Progressing   The patient's goals for the shift include      The clinical goals for the shift include Patient free from fall

## 2025-03-03 NOTE — PROGRESS NOTES
03/03/25 1400   Discharge Planning   Living Arrangements Alone   Support Systems Parent   Type of Residence Private residence   Home or Post Acute Services None   Expected Discharge Disposition Home     Care Transitions Note  03/03/25  Met with patient and mother at bedside. Patient states she has been independent and is able to complete ADLs at home. She lives in an apartment. She had home care in the past and does not feel she needs their services at this time. She has a rollator at home. Pt may need transport home. Will follow for additional dc needs.   Bouchra Stout, MSW, LSW

## 2025-03-03 NOTE — POST-PROCEDURE NOTE
Interventional Radiology Brief Postprocedure Note    Attending: Dr. Arnoldo Noonan MD    Assistant: Dr. Jolie Phillips MD    Diagnosis: The encounter diagnosis was Sickle cell anemia with pain (Multi). Need for nontunneled central venous catheter for red cell exchange transfusion.     Description of procedure: A time out was performed identifying the correct procedure, the correct location with the nursing staff.  The right groin was prepped with 2% chlorhexidine and draped with a full length sterile sheet in the usual fashion.  1% lidocaine was administered subcutaneously and down to the level outside of the right common femoral vein for local anesthesia.  The right common femoral vein was subsequently accessed under ultrasound guidance with an 21 gauge needle.  A catheter was inserted via the seldinger technique under fluoroscopic examination that confirmed inferior vena cava tip location.  Dark, nonpulsatile blood was withdrawn and each port was locked with heparin.  The catheter was sterilely dressed over the site prior to removal of drapes.      The patient tolerated the procedure well and there were no complications.      Anesthesia:  Local, Sedation    Complications: None    Estimated Blood Loss: minimal    Medications  As of 03/03/25 1119      sennosides (Senokot) tablet 8.6 mg (tablet) Total dose:  0 tablet* Dosing weight:  95.7   *Administration not included in total     Date/Time Rate/Dose/Volume Action       03/02/25  1412 *8.6 mg Missed               sennosides (Senokot) tablet 17.2 mg (tablet) Total dose:  0 tablet* Dosing weight:  95.7   *Administration not included in total     Date/Time Rate/Dose/Volume Action       03/02/25  2014 *17.2 mg Missed     03/03/25  0831 *17.2 mg Missed               heparin bolus from bag 4,000 Units (Units) Total dose:  4,000 Units Dosing weight:  95.7      Date/Time Rate/Dose/Volume Action       03/02/25  1716 4,000 Units Bolus from Bag               heparin 25,000 Units  in dextrose 5% 250 mL (100 Units/mL) infusion (premix) (Units/hr) Total dose:  Not documented* Dosing weight:  95.7   *Total volume has not been documented. View each administration to see the amount administered.     Date/Time Rate/Dose/Volume Action       03/02/25  1715 1,148 Units/hr - 11.5 mL/hr New Bag      1846 *Not included in total Missed      1908 *Not included in total Handoff     03/03/25  0117 1,148 Units/hr - 11.5 mL/hr Rate Verify      0539  Stopped               folic acid (Folvite) tablet 1 mg (mg) Total dose:  1 mg*   *Administration not included in total     Date/Time Rate/Dose/Volume Action       03/02/25  1445 *1 mg Missed     03/03/25  0823 1 mg Given               furosemide (Lasix) tablet 20 mg (mg) Total dose:  20 mg      Date/Time Rate/Dose/Volume Action       03/03/25  0823 20 mg Given               cetirizine (ZyrTEC) tablet 10 mg (mg) Total dose:  20 mg Dosing weight:  95.7      Date/Time Rate/Dose/Volume Action       03/02/25  1445 10 mg Given     03/03/25  0823 10 mg Given               metoprolol tartrate (Lopressor) tablet 25 mg (mg) Total dose:  50 mg* Dosing weight:  95.7   *Administration not included in total     Date/Time Rate/Dose/Volume Action       03/02/25  1446 *25 mg Missed      2014 25 mg Given     03/03/25  0822 25 mg Given               white petrolatum (Aquaphor) ointment 2 Application (Application) Total dose:  0 Application*   *Administration not included in total     Date/Time Rate/Dose/Volume Action       03/02/25  1503 *2 Application Missed      2015 *2 Application Missed               triamcinolone (Kenalog) 0.1 % cream Total dose:  Cannot be calculated*   *Administration dose not documented     Date/Time Rate/Dose/Volume Action       03/02/25  1503 *Not included in total Missed      2015 *Not included in total Missed     03/03/25  0832 *Not included in total Missed               cyclobenzaprine (Flexeril) tablet 10 mg (mg) Total dose:  0 mg* Dosing weight:  95.7    *Administration not included in total     Date/Time Rate/Dose/Volume Action       03/02/25  1445 *10 mg Missed               HYDROmorphone (Dilaudid) injection 1 mg (mg) Total dose:  6 mg Dosing weight:  95.7      Date/Time Rate/Dose/Volume Action       03/02/25  1444 1 mg Given      1832 1 mg Given      2133 1 mg Given     03/03/25  0221 1 mg Given      0554 1 mg Given      0926 1 mg Given               HYDROmorphone (Dilaudid) injection 0.5 mg (mg) Total dose:  1.5 mg Dosing weight:  95.7      Date/Time Rate/Dose/Volume Action       03/02/25  1632 0.5 mg Given      2317 0.5 mg Given     03/03/25  0822 0.5 mg Given               polyethylene glycol (Glycolax, Miralax) packet 17 g (g) Total dose:  0 g* Dosing weight:  95.7   *Administration not included in total     Date/Time Rate/Dose/Volume Action       03/02/25  1515 *17 g Missed     03/03/25  0831 *17 g Missed               atropine 1 % ophthalmic solution 1 drop (drop) Total dose:  1 drop Dosing weight:  95.7      Date/Time Rate/Dose/Volume Action       03/02/25  2148 1 drop Given               fentaNYL PF (Sublimaze) injection (mcg) Total dose:  100 mcg      Date/Time Rate/Dose/Volume Action       03/03/25  1055 50 mcg Given      1100 50 mcg Given               midazolam (Versed) injection (mg) Total dose:  2 mg      Date/Time Rate/Dose/Volume Action       03/03/25  1055 1 mg Given      1100 1 mg Given                   No specimens collected      See detailed result report with images in PACS.    The patient tolerated the procedure well without incident or complication and is in stable condition.

## 2025-03-04 ENCOUNTER — APPOINTMENT (OUTPATIENT)
Dept: VASCULAR MEDICINE | Facility: HOSPITAL | Age: 55
End: 2025-03-04
Payer: COMMERCIAL

## 2025-03-04 LAB
ALBUMIN SERPL BCP-MCNC: 3.3 G/DL (ref 3.4–5)
ALP SERPL-CCNC: 112 U/L (ref 33–110)
ALT SERPL W P-5'-P-CCNC: 9 U/L (ref 7–45)
AMPHETAMINES UR QL SCN: ABNORMAL
ANION GAP SERPL CALC-SCNC: 13 MMOL/L (ref 10–20)
APPEARANCE UR: CLEAR
AST SERPL W P-5'-P-CCNC: 19 U/L (ref 9–39)
BARBITURATES UR QL SCN: ABNORMAL
BASOPHILS # BLD MANUAL: 0 X10*3/UL (ref 0–0.1)
BASOPHILS NFR BLD MANUAL: 0 %
BILIRUB SERPL-MCNC: 2.5 MG/DL (ref 0–1.2)
BILIRUB UR STRIP.AUTO-MCNC: NEGATIVE MG/DL
BUN SERPL-MCNC: 15 MG/DL (ref 6–23)
BZE UR QL SCN: ABNORMAL
CA-I BLD-SCNC: 1.1 MMOL/L (ref 1.1–1.33)
CALCIUM SERPL-MCNC: 8.5 MG/DL (ref 8.6–10.6)
CANNABINOIDS UR QL SCN: ABNORMAL
CHLORIDE SERPL-SCNC: 99 MMOL/L (ref 98–107)
CO2 SERPL-SCNC: 30 MMOL/L (ref 21–32)
COLOR UR: YELLOW
CREAT SERPL-MCNC: 1.38 MG/DL (ref 0.5–1.05)
CREAT UR-MCNC: 177.7 MG/DL (ref 20–320)
EGFRCR SERPLBLD CKD-EPI 2021: 45 ML/MIN/1.73M*2
EOSINOPHIL # BLD MANUAL: 0.08 X10*3/UL (ref 0–0.7)
EOSINOPHIL NFR BLD MANUAL: 0.9 %
ERYTHROCYTE [DISTWIDTH] IN BLOOD BY AUTOMATED COUNT: 19.9 % (ref 11.5–14.5)
ERYTHROCYTE [DISTWIDTH] IN BLOOD BY AUTOMATED COUNT: 20.5 % (ref 11.5–14.5)
GLUCOSE SERPL-MCNC: 109 MG/DL (ref 74–99)
GLUCOSE UR STRIP.AUTO-MCNC: NORMAL MG/DL
HCT VFR BLD AUTO: 29 % (ref 36–46)
HCT VFR BLD AUTO: 29.4 % (ref 36–46)
HEMOGLOBIN A2: 2.6 % (ref 2–3.5)
HEMOGLOBIN A: 69.8 % (ref 95.8–98)
HEMOGLOBIN C: 13.6 %
HEMOGLOBIN F: 0.5 % (ref 0–2)
HEMOGLOBIN IDENTIFICATION INTERPRETATION: ABNORMAL
HEMOGLOBIN S: 13.5 %
HGB BLD-MCNC: 9.5 G/DL (ref 12–16)
HGB BLD-MCNC: 9.7 G/DL (ref 12–16)
HGB RETIC QN: 29 PG (ref 28–38)
IMM GRANULOCYTES # BLD AUTO: 0.06 X10*3/UL (ref 0–0.7)
IMM GRANULOCYTES NFR BLD AUTO: 0.6 % (ref 0–0.9)
IMMATURE RETIC FRACTION: 34.8 %
INR PPP: 1.2 (ref 0.9–1.1)
KETONES UR STRIP.AUTO-MCNC: NEGATIVE MG/DL
LDH SERPL L TO P-CCNC: 256 U/L (ref 84–246)
LEUKOCYTE ESTERASE UR QL STRIP.AUTO: NEGATIVE
LYMPHOCYTES # BLD MANUAL: 1.97 X10*3/UL (ref 1.2–4.8)
LYMPHOCYTES NFR BLD MANUAL: 21.2 %
MCH RBC QN AUTO: 27.8 PG (ref 26–34)
MCH RBC QN AUTO: 28.4 PG (ref 26–34)
MCHC RBC AUTO-ENTMCNC: 32.8 G/DL (ref 32–36)
MCHC RBC AUTO-ENTMCNC: 33 G/DL (ref 32–36)
MCV RBC AUTO: 84 FL (ref 80–100)
MCV RBC AUTO: 87 FL (ref 80–100)
METAMYELOCYTES # BLD MANUAL: 0.07 X10*3/UL
METAMYELOCYTES NFR BLD MANUAL: 0.8 %
MONOCYTES # BLD MANUAL: 0.55 X10*3/UL (ref 0.1–1)
MONOCYTES NFR BLD MANUAL: 5.9 %
MUCOUS THREADS #/AREA URNS AUTO: NORMAL /LPF
NEUTS SEG # BLD MANUAL: 6.54 X10*3/UL (ref 1.2–7)
NEUTS SEG NFR BLD MANUAL: 70.3 %
NITRITE UR QL STRIP.AUTO: NEGATIVE
NRBC BLD-RTO: 10.4 /100 WBCS (ref 0–0)
NRBC BLD-RTO: 8.6 /100 WBCS (ref 0–0)
PATH REVIEW-HGB IDENTIFICATION: ABNORMAL
PCP UR QL SCN: ABNORMAL
PH UR STRIP.AUTO: 6.5 [PH]
PLATELET # BLD AUTO: 144 X10*3/UL (ref 150–450)
PLATELET # BLD AUTO: 158 X10*3/UL (ref 150–450)
POLYCHROMASIA BLD QL SMEAR: NORMAL
POTASSIUM SERPL-SCNC: 5 MMOL/L (ref 3.5–5.3)
PROT SERPL-MCNC: 6.1 G/DL (ref 6.4–8.2)
PROT UR STRIP.AUTO-MCNC: ABNORMAL MG/DL
PROTHROMBIN TIME: 13.2 SECONDS (ref 9.8–12.4)
RBC # BLD AUTO: 3.35 X10*6/UL (ref 4–5.2)
RBC # BLD AUTO: 3.49 X10*6/UL (ref 4–5.2)
RBC # UR STRIP.AUTO: NEGATIVE MG/DL
RBC #/AREA URNS AUTO: NORMAL /HPF
RBC MORPH BLD: NORMAL
RETICS #: 0.18 X10*6/UL (ref 0.02–0.08)
RETICS/RBC NFR AUTO: 5.3 % (ref 0.5–2)
SODIUM SERPL-SCNC: 137 MMOL/L (ref 136–145)
SP GR UR STRIP.AUTO: 1.02
SQUAMOUS #/AREA URNS AUTO: NORMAL /HPF
TARGETS BLD QL SMEAR: NORMAL
TOTAL CELLS COUNTED BLD: 118
UFH PPP CHRO-ACNC: 0.4 IU/ML
UFH PPP CHRO-ACNC: 0.5 IU/ML
UROBILINOGEN UR STRIP.AUTO-MCNC: ABNORMAL MG/DL
VARIANT LYMPHS # BLD MANUAL: 0.08 X10*3/UL (ref 0–0.5)
VARIANT LYMPHS NFR BLD: 0.9 %
WBC # BLD AUTO: 8.3 X10*3/UL (ref 4.4–11.3)
WBC # BLD AUTO: 9.3 X10*3/UL (ref 4.4–11.3)
WBC #/AREA URNS AUTO: NORMAL /HPF

## 2025-03-04 PROCEDURE — 85610 PROTHROMBIN TIME: CPT

## 2025-03-04 PROCEDURE — 2500000001 HC RX 250 WO HCPCS SELF ADMINISTERED DRUGS (ALT 637 FOR MEDICARE OP)

## 2025-03-04 PROCEDURE — 2500000004 HC RX 250 GENERAL PHARMACY W/ HCPCS (ALT 636 FOR OP/ED)

## 2025-03-04 PROCEDURE — 85520 HEPARIN ASSAY: CPT

## 2025-03-04 PROCEDURE — 81001 URINALYSIS AUTO W/SCOPE: CPT

## 2025-03-04 PROCEDURE — 80053 COMPREHEN METABOLIC PANEL: CPT

## 2025-03-04 PROCEDURE — 83021 HEMOGLOBIN CHROMOTOGRAPHY: CPT

## 2025-03-04 PROCEDURE — 99233 SBSQ HOSP IP/OBS HIGH 50: CPT

## 2025-03-04 PROCEDURE — 85007 BL SMEAR W/DIFF WBC COUNT: CPT

## 2025-03-04 PROCEDURE — 36415 COLL VENOUS BLD VENIPUNCTURE: CPT

## 2025-03-04 PROCEDURE — 85027 COMPLETE CBC AUTOMATED: CPT

## 2025-03-04 PROCEDURE — 85045 AUTOMATED RETICULOCYTE COUNT: CPT

## 2025-03-04 PROCEDURE — 2500000002 HC RX 250 W HCPCS SELF ADMINISTERED DRUGS (ALT 637 FOR MEDICARE OP, ALT 636 FOR OP/ED)

## 2025-03-04 PROCEDURE — 82570 ASSAY OF URINE CREATININE: CPT

## 2025-03-04 PROCEDURE — 1200000003 HC ONCOLOGY  ROOM WITH TELEMETRY DAILY

## 2025-03-04 PROCEDURE — 2500000004 HC RX 250 GENERAL PHARMACY W/ HCPCS (ALT 636 FOR OP/ED): Mod: JZ,TB

## 2025-03-04 PROCEDURE — 83020 HEMOGLOBIN ELECTROPHORESIS: CPT | Performed by: PATHOLOGY

## 2025-03-04 PROCEDURE — 93971 EXTREMITY STUDY: CPT

## 2025-03-04 PROCEDURE — 82330 ASSAY OF CALCIUM: CPT

## 2025-03-04 PROCEDURE — 80349 CANNABINOIDS NATURAL: CPT

## 2025-03-04 PROCEDURE — 93971 EXTREMITY STUDY: CPT | Performed by: SURGERY

## 2025-03-04 PROCEDURE — 83615 LACTATE (LD) (LDH) ENZYME: CPT

## 2025-03-04 PROCEDURE — 80346 BENZODIAZEPINES1-12: CPT

## 2025-03-04 RX ORDER — DEXTROSE MONOHYDRATE AND SODIUM CHLORIDE 5; .45 G/100ML; G/100ML
500 INJECTION, SOLUTION INTRAVENOUS ONCE
Status: COMPLETED | OUTPATIENT
Start: 2025-03-04 | End: 2025-03-04

## 2025-03-04 RX ORDER — ENOXAPARIN SODIUM 100 MG/ML
100 INJECTION SUBCUTANEOUS 2 TIMES DAILY
Status: DISCONTINUED | OUTPATIENT
Start: 2025-03-04 | End: 2025-03-09 | Stop reason: HOSPADM

## 2025-03-04 RX ORDER — WARFARIN SODIUM 5 MG/1
5 TABLET ORAL ONCE
Status: COMPLETED | OUTPATIENT
Start: 2025-03-04 | End: 2025-03-04

## 2025-03-04 RX ORDER — HYDROMORPHONE HYDROCHLORIDE 1 MG/ML
1 INJECTION, SOLUTION INTRAMUSCULAR; INTRAVENOUS; SUBCUTANEOUS EVERY 2 HOUR PRN
Status: DISCONTINUED | OUTPATIENT
Start: 2025-03-04 | End: 2025-03-06

## 2025-03-04 RX ADMIN — HYDROMORPHONE HYDROCHLORIDE 1 MG: 1 INJECTION, SOLUTION INTRAMUSCULAR; INTRAVENOUS; SUBCUTANEOUS at 06:42

## 2025-03-04 RX ADMIN — HYDROMORPHONE HYDROCHLORIDE 1 MG: 1 INJECTION, SOLUTION INTRAMUSCULAR; INTRAVENOUS; SUBCUTANEOUS at 03:13

## 2025-03-04 RX ADMIN — HYDROMORPHONE HYDROCHLORIDE 1 MG: 1 INJECTION, SOLUTION INTRAMUSCULAR; INTRAVENOUS; SUBCUTANEOUS at 13:33

## 2025-03-04 RX ADMIN — HYDROMORPHONE HYDROCHLORIDE 1 MG: 1 INJECTION, SOLUTION INTRAMUSCULAR; INTRAVENOUS; SUBCUTANEOUS at 21:53

## 2025-03-04 RX ADMIN — HYDROMORPHONE HYDROCHLORIDE 0.5 MG: 1 INJECTION, SOLUTION INTRAMUSCULAR; INTRAVENOUS; SUBCUTANEOUS at 07:52

## 2025-03-04 RX ADMIN — METOPROLOL TARTRATE 25 MG: 25 TABLET, FILM COATED ORAL at 09:59

## 2025-03-04 RX ADMIN — HYDROMORPHONE HYDROCHLORIDE 0.5 MG: 1 INJECTION, SOLUTION INTRAMUSCULAR; INTRAVENOUS; SUBCUTANEOUS at 12:12

## 2025-03-04 RX ADMIN — DEXTROSE AND SODIUM CHLORIDE 500 ML: 5; 450 INJECTION, SOLUTION INTRAVENOUS at 13:10

## 2025-03-04 RX ADMIN — FOLIC ACID 1 MG: 1 TABLET ORAL at 09:59

## 2025-03-04 RX ADMIN — CETIRIZINE HYDROCHLORIDE 10 MG: 10 TABLET, FILM COATED ORAL at 09:59

## 2025-03-04 RX ADMIN — METOPROLOL TARTRATE 25 MG: 25 TABLET, FILM COATED ORAL at 19:54

## 2025-03-04 RX ADMIN — HYDROMORPHONE HYDROCHLORIDE 0.5 MG: 1 INJECTION, SOLUTION INTRAMUSCULAR; INTRAVENOUS; SUBCUTANEOUS at 04:22

## 2025-03-04 RX ADMIN — ENOXAPARIN SODIUM 100 MG: 100 INJECTION SUBCUTANEOUS at 21:53

## 2025-03-04 RX ADMIN — WARFARIN SODIUM 5 MG: 5 TABLET ORAL at 19:54

## 2025-03-04 RX ADMIN — HYDROMORPHONE HYDROCHLORIDE 1 MG: 1 INJECTION, SOLUTION INTRAMUSCULAR; INTRAVENOUS; SUBCUTANEOUS at 10:32

## 2025-03-04 RX ADMIN — HYDROMORPHONE HYDROCHLORIDE 1 MG: 1 INJECTION, SOLUTION INTRAMUSCULAR; INTRAVENOUS; SUBCUTANEOUS at 15:36

## 2025-03-04 RX ADMIN — HYDROMORPHONE HYDROCHLORIDE 0.5 MG: 1 INJECTION, SOLUTION INTRAMUSCULAR; INTRAVENOUS; SUBCUTANEOUS at 01:09

## 2025-03-04 RX ADMIN — HYDROMORPHONE HYDROCHLORIDE 1 MG: 1 INJECTION, SOLUTION INTRAMUSCULAR; INTRAVENOUS; SUBCUTANEOUS at 17:21

## 2025-03-04 RX ADMIN — HYDROMORPHONE HYDROCHLORIDE 1 MG: 1 INJECTION, SOLUTION INTRAMUSCULAR; INTRAVENOUS; SUBCUTANEOUS at 19:52

## 2025-03-04 RX ADMIN — ATROPINE SULFATE 1 DROP: 10 SOLUTION/ DROPS OPHTHALMIC at 21:53

## 2025-03-04 RX ADMIN — HEPARIN SODIUM 1150 UNITS/HR: 10000 INJECTION, SOLUTION INTRAVENOUS at 20:54

## 2025-03-04 ASSESSMENT — COGNITIVE AND FUNCTIONAL STATUS - GENERAL
DAILY ACTIVITIY SCORE: 24
MOBILITY SCORE: 24
MOBILITY SCORE: 24
DAILY ACTIVITIY SCORE: 24

## 2025-03-04 ASSESSMENT — PAIN - FUNCTIONAL ASSESSMENT

## 2025-03-04 ASSESSMENT — PAIN SCALES - GENERAL
PAINLEVEL_OUTOF10: 8
PAINLEVEL_OUTOF10: 8
PAINLEVEL_OUTOF10: 9
PAINLEVEL_OUTOF10: 8
PAINLEVEL_OUTOF10: 7
PAINLEVEL_OUTOF10: 8
PAINLEVEL_OUTOF10: 8
PAINLEVEL_OUTOF10: 7
PAINLEVEL_OUTOF10: 8
PAINLEVEL_OUTOF10: 7
PAINLEVEL_OUTOF10: 8
PAINLEVEL_OUTOF10: 7
PAINLEVEL_OUTOF10: 8

## 2025-03-04 NOTE — DISCHARGE INSTRUCTIONS
Keep the head of your bed elevated; avoid heavy bending or lifting  Avoid NSAIDs (ibuprofen, naproxen) if able. Continue medically necessary blood thinners.     For adult ophthalmology follow-up appointments, call: [phone] (ophthalmology to call you to arrange your outpt laser apt)

## 2025-03-04 NOTE — NURSING NOTE
Patient's telemetry batteries changed.  Telemetry monitor turned back on and functioning appropriately. Telemetry strip printed, read, and documented. Signed by off going and on coming nurses.

## 2025-03-04 NOTE — NURSING NOTE
Unable to complete tele handoff with dayshift nurse, molly craig RN. Tele monitors are down at the moment. Handoff was completed and dayshift and nightshift nurse went to check on patient.

## 2025-03-04 NOTE — PROGRESS NOTES
"Senait Narvaez is a 55 y.o. female on day 1 of admission presenting with Cardiomyopathy, ischemic.      Subjective   She feels her vision is overall stable. No pain or new flashes/floaters.       Objective     Last Recorded Vitals  Blood pressure 103/61, pulse 71, temperature 36.4 °C (97.5 °F), temperature source Temporal, resp. rate 16, height 1.651 m (5' 5\"), weight 95.7 kg (210 lb 15.7 oz), SpO2 100%.    Physical Exam  Base Eye Exam       Visual Acuity (Snellen - Linear)         Right Left    Near cc 20/30 phni 20/200 ph 20/70-1              Tonometry (Tonopen, 9:32 PM)         Right Left    Pressure 8 11              Pupils         Dark Light Shape React APD    Right 5 3 Round Brisk None    Left 7 7 Round Pharm dilated none by reverse              Visual Fields (Counting fingers)         Left Right      Full                                Extraocular Movement         Right Left     Full Full              Neuro/Psych       Oriented x3: Yes                        Assessment/Plan   Assessment & Plan  Cardiomyopathy, ischemic    Sickle cell anemia with pain (Multi)    Update 3/03: Improving visual acuity OS likely from settling vitreous hemorrhage    #Vitreous Hemorrhage left eye  #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   - Recommend elevated head of bed, avoid heavy bending or lifting  - Avoid NSAIDs if able. Continue medically necessary blood thinners.   - Continue atropine eye drops at bedtime left eye  - Ophthalmology will re-evaluate daily as blood settles.     Patient discussed with retina attending Dr. Biggs.     Narendra Ly PGY3     Ophthalmology Adult Pager - [phone]  Ophthalmology Pediatrics Pager - [phone]     For adult follow-up appointments, call: [phone]  For pediatric follow-up appointments, call: [phone]     NOTE: This note is not finalized until attending reviews and signs.        "

## 2025-03-04 NOTE — CARE PLAN
Problem: Pain - Adult  Goal: Verbalizes/displays adequate comfort level or baseline comfort level  Outcome: Progressing     Problem: Safety - Adult  Goal: Free from fall injury  Outcome: Progressing     Problem: Discharge Planning  Goal: Discharge to home or other facility with appropriate resources  Outcome: Progressing     Problem: Chronic Conditions and Co-morbidities  Goal: Patient's chronic conditions and co-morbidity symptoms are monitored and maintained or improved  Outcome: Progressing     Problem: Nutrition  Goal: Nutrient intake appropriate for maintaining nutritional needs  Outcome: Progressing   The patient's goals for the shift include      The clinical goals for the shift include Pt will remain safe in room and use call light during shift on 3/4/25 @ 1930    Pt had central line change twice r/t drainage. Hep gtt prepared to stop this evening and bridge to coumadin. Pt increased pain medication frequency.

## 2025-03-04 NOTE — PROGRESS NOTES
"Senait Narvaez is a 55 y.o. female on day 2 of admission presenting with Cardiomyopathy, ischemic.    Subjective   Patient resting in bed, states pain slightly worse today especially in legs and arms, 8/10. Some mild dizziness/lightheadedness, not new for her. States left eye vision unchanged from admission, unable to see objects clearly, endorses floaters and red spots in vision, no eye pain. Denies chest pain, SOB, abdominal pain, n/v/c/d, headache, fever, chills.        Objective     Physical Exam  Constitutional:       Appearance: Normal appearance.   HENT:      Head: Normocephalic.      Nose: Nose normal.      Mouth/Throat:      Mouth: Mucous membranes are moist.   Eyes:      General: Lids are normal. Visual field deficit present.      Extraocular Movements: Extraocular movements intact.      Conjunctiva/sclera: Conjunctivae normal.   Cardiovascular:      Rate and Rhythm: Normal rate and regular rhythm.      Pulses: Normal pulses.      Heart sounds: Normal heart sounds.   Pulmonary:      Effort: Pulmonary effort is normal.      Breath sounds: Normal breath sounds.   Abdominal:      General: Abdomen is flat. Bowel sounds are normal.      Palpations: Abdomen is soft.   Musculoskeletal:         General: Normal range of motion.      Cervical back: Normal range of motion.   Skin:     General: Skin is warm.   Neurological:      General: No focal deficit present.      Mental Status: She is alert and oriented to person, place, and time.   Psychiatric:         Mood and Affect: Mood normal.         Behavior: Behavior normal.       Last Recorded Vitals  Blood pressure 101/68, pulse 67, temperature 36.5 °C (97.7 °F), temperature source Temporal, resp. rate 16, height 1.651 m (5' 5\"), weight 95.7 kg (210 lb 15.7 oz), SpO2 97%.  Intake/Output last 3 Shifts:  I/O last 3 completed shifts:  In: 2250 (23.5 mL/kg) [Other:2250]  Out: 2783 (29.1 mL/kg) [Urine:550 (0.2 mL/kg/hr); Other:2233]  Weight: 95.7 kg     Relevant Results       "    Scheduled medications  atropine, 1 drop, Left Eye, Nightly  cetirizine, 10 mg, oral, Daily  dextrose 5%-0.45 % sodium chloride, 500 mL, intravenous, Once  folic acid, 1 mg, oral, Daily  furosemide, 20 mg, oral, Every other day  metoprolol tartrate, 25 mg, oral, BID  polyethylene glycol, 17 g, oral, Daily  sennosides, 2 tablet, oral, BID  triamcinolone, , Topical, BID      Continuous medications  heparin, 0-4,000 Units/hr, Last Rate: 1,150 Units/hr (03/03/25 8740)      PRN medications  PRN medications: albuterol, cyclobenzaprine, fluticasone, HYDROmorphone, ondansetron, oxygen, traZODone  Results for orders placed or performed during the hospital encounter of 03/02/25 (from the past 24 hours)   Heparin Assay, UFH   Result Value Ref Range    Heparin Unfractionated 0.4 See Comment Below for Therapeutic Ranges IU/mL   CBC   Result Value Ref Range    WBC 8.3 4.4 - 11.3 x10*3/uL    nRBC 8.6 (H) 0.0 - 0.0 /100 WBCs    RBC 3.49 (L) 4.00 - 5.20 x10*6/uL    Hemoglobin 9.7 (L) 12.0 - 16.0 g/dL    Hematocrit 29.4 (L) 36.0 - 46.0 %    MCV 84 80 - 100 fL    MCH 27.8 26.0 - 34.0 pg    MCHC 33.0 32.0 - 36.0 g/dL    RDW 19.9 (H) 11.5 - 14.5 %    Platelets 144 (L) 150 - 450 x10*3/uL   Heparin Assay, UFH   Result Value Ref Range    Heparin Unfractionated 0.4 See Comment Below for Therapeutic Ranges IU/mL   CBC and Auto Differential   Result Value Ref Range    WBC 9.3 4.4 - 11.3 x10*3/uL    nRBC 10.4 (H) 0.0 - 0.0 /100 WBCs    RBC 3.35 (L) 4.00 - 5.20 x10*6/uL    Hemoglobin 9.5 (L) 12.0 - 16.0 g/dL    Hematocrit 29.0 (L) 36.0 - 46.0 %    MCV 87 80 - 100 fL    MCH 28.4 26.0 - 34.0 pg    MCHC 32.8 32.0 - 36.0 g/dL    RDW 20.5 (H) 11.5 - 14.5 %    Platelets 158 150 - 450 x10*3/uL    Immature Granulocytes %, Automated 0.6 0.0 - 0.9 %    Immature Granulocytes Absolute, Automated 0.06 0.00 - 0.70 x10*3/uL   Comprehensive Metabolic Panel   Result Value Ref Range    Glucose 109 (H) 74 - 99 mg/dL    Sodium 137 136 - 145 mmol/L    Potassium  5.0 3.5 - 5.3 mmol/L    Chloride 99 98 - 107 mmol/L    Bicarbonate 30 21 - 32 mmol/L    Anion Gap 13 10 - 20 mmol/L    Urea Nitrogen 15 6 - 23 mg/dL    Creatinine 1.38 (H) 0.50 - 1.05 mg/dL    eGFR 45 (L) >60 mL/min/1.73m*2    Calcium 8.5 (L) 8.6 - 10.6 mg/dL    Albumin 3.3 (L) 3.4 - 5.0 g/dL    Alkaline Phosphatase 112 (H) 33 - 110 U/L    Total Protein 6.1 (L) 6.4 - 8.2 g/dL    AST 19 9 - 39 U/L    Bilirubin, Total 2.5 (H) 0.0 - 1.2 mg/dL    ALT 9 7 - 45 U/L   Lactate Dehydrogenase   Result Value Ref Range     (H) 84 - 246 U/L   Reticulocytes   Result Value Ref Range    Retic % 5.3 (H) 0.5 - 2.0 %    Retic Absolute 0.176 (H) 0.018 - 0.083 x10*6/uL    Reticulocyte Hemoglobin 29 28 - 38 pg    Immature Retic fraction 34.8 (H) <=16.0 %   Manual Differential   Result Value Ref Range    Neutrophils %, Manual 70.3 40.0 - 80.0 %    Lymphocytes %, Manual 21.2 13.0 - 44.0 %    Monocytes %, Manual 5.9 2.0 - 10.0 %    Eosinophils %, Manual 0.9 0.0 - 6.0 %    Basophils %, Manual 0.0 0.0 - 2.0 %    Atypical Lymphocytes %, Manual 0.9 0.0 - 2.0 %    Metamyelocytes %, Manual 0.8 0.0 - 0.0 %    Seg Neutrophils Absolute, Manual 6.54 1.20 - 7.00 x10*3/uL    Lymphocytes Absolute, Manual 1.97 1.20 - 4.80 x10*3/uL    Monocytes Absolute, Manual 0.55 0.10 - 1.00 x10*3/uL    Eosinophils Absolute, Manual 0.08 0.00 - 0.70 x10*3/uL    Basophils Absolute, Manual 0.00 0.00 - 0.10 x10*3/uL    Atypical Lymphs Absolute, Manual 0.08 0.00 - 0.50 x10*3/uL    Metamyelocytes Absolute, Manual 0.07 0.00 - 0.00 x10*3/uL    Total Cells Counted 118     RBC Morphology See Below     Polychromasia Mild     Target Cells Few    Hemoglobin Identification with Path Review   Result Value Ref Range    Hemoglobin A      Hemoglobin F      Hemoglobin S 13.5 (H) <=0.0 %    Hemoglobin C 13.6 (H) <=0.0 %    Hemoglobin A2      Hemoglobin Identification Interpretation      Pathologist Review-Hemoglobin Identification     Heparin Assay, UFH   Result Value Ref Range     Heparin Unfractionated 0.5 See Comment Below for Therapeutic Ranges IU/mL   Lower extremity venous duplex left   Result Value Ref Range    BSA 2.09 m2     *Note: Due to a large number of results and/or encounters for the requested time period, some results have not been displayed. A complete set of results can be found in Results Review.          This patient has a central line   Reason for the central line remaining today? 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 Patient also presents with painless L eye vision loss w/floaters and blurriness is most concerning for a retinal detachment and acute retinopathy in the setting of SCD. IR consulted and placed apheresis line 3/3, s/p routine exchange 3/3. Ophthalmology emergently consulted for evaluation concerned for vitreous hemorrhage possible retinol detachment from sickle retinopathy. Started on atropine eye drops. Ophthalmology recommend panretinal photocoagulation 3/5 or 3/6. Started on low-intensity heparin ggt; warfarin not restarted on admission while waiting for ophtho recs.  Started bridge to home warfarin 3/4 with lovenox per pharmacy recs (ok'd by opho). CTH wo con 3/2 to rule out hemorrhagic stroke, no acute findings. MRI brain 3/3 to evaluate for acute ischemic stroke, no evidence of acute infarct, intracranial mass effect or midline shift. Chronic sickle cell changes present but no acute findings. Discharge home  pending pain control and ophthalmology intervention.    Updates 3/4:  - Ultrasound BLE 3/4 preliminary findings no acute DVT visualized   - Ok from ophthalmology perspective to resume home warfarin, discontinue heparin gtt  - Ophthalmology plans panretinal photocoagulation 3/5 or 3/6, done in ophthalmology clinic, does not need to be NPO  - s/p exchange 3/3 with worsening pain 3/4, dilaudid 1 mg IV frequency increased to q 2 hours  - 500 mL D51/2NS given for CHARLES, UA pending  - Per pharmacy, resumed warfarin at last dose (5 mg), continue heparin gtt until first lovenox 100 mg BID given at 2100 on 3/4, discontinue heparin gtt at 2200; monitor daily PT/INR for warfarin adjustments until INR goal 2-3     # Painless vision loss in left eye, c/f acute retinal detachment, SCD-related retinopathy  -STAT consulted ophthalmology likely vitreous hemorrhage possible retinol detachment from sickle retinopathy.   -STAT CTH (3/2) wo neg for acute findings   -MRI brain w/wo con (3/3) showing chronic changes with no acute findings   -Started on atropine eye drops 3/2-current  - Ophthalmology plans panretinal photocoagulation 3/5 or 3/6, done in ophthalmology clinic, does not need to be NPO  - Ok from ophthalmology perspective to resume home warfarin, discontinue heparin gtt    # Hb SC disease   #Acute on chronic SCD related pain crisis  - currently on q6 week RBC exchange transfusions (most recent 1/20/25); done 3/3/25  - [facility] reviewed, last updated 7/25/24  - [facility] reviewed, no aberrant behavior noted (last filled oxycodone 2/25 x12 days qty 75 tabs)  - Hemolysis labs are at baseline, pre-exchange labs obtained 2/28/25  - Hgb baseline ~10; 9.5 (3/4)  - Hgb S: pre-exchange 36.1%; post-exchange pending (3/4)  - s/p routine exchange 3/3  - s/p dilaudid 1 mg q3h prn for severe pain; IV dilaudid 0.5 mg q2h for moderate pain  - Increased dilaudid frequency to 1 mg IV q 2 hours for increased pain on 3/4  - Cyclobenzaprine 10 mg every  8 hours prn   - Bowel regimen for opioid induced constipation with Senna 2tabs BID and Miralax daily  - Can consider PO Benadryl 25 mg q6h PRN for opioid-induced pruritus  - PO Zofran 8 mg q8h PRN for opioid-induced nausea  - continue 2L night time oxygen   - c/w home folic acid 1 mg daily  - Utox: ordered     # CHARLES  - Etiology likely pre-renal secondary to exchange 3/3, no new medications initiated this admission  - CT and MRI done 3/2 without contrast  - s/p 500 mL bolus D51/2NS  - UA (3/4) pending    # L Breast Mass, s/p bx 1/31, low suspicion for malignancy  - [facility]ished with [facility], s/p bx, follow up in 6 months (scheduled for 8/2025)     #DVT, PE  # SVC thrombus  - On warfarin at home, most recent schedule was 5 mg nightly except on Tuesdays she will take 7.5 mg. She does not take warfarin on Saturday and Sundays.   - S/p low-intensity heparin gtt for line placement (3/3-planned dc 3/4 evening)  [facility]  - Per pharmacy, resumed warfarin at last dose (5 mg), continue heparin gtt until first lovenox 100 mg BID given at 2100 on 3/4, discontinue heparin gtt at 2200; monitor daily PT/INR for warfarin adjustments until INR goal 2-3  - s/p ultrasound BLE for swelling (3/4), preliminary results show no acute DVT     #hx SVT  # pHTN  - c/w home metop 25 mg BID and Lasix 20 every other day   - telemetry   - EKG ordered      #DISPO  - Full code - confirmed on admit  - DC home pending completed hb exchange and pain control and L eye intervention   - SURROGATE DECISION MAKER: Daughter Tati (mom) [phone]; pt also has a daughter.  - Sickle cell FUV 3/10 w/ Dr. Whaley, Cardiology 3/12     Assessment and plan as above discussed with attending physician Dr. Hensley         I spent 60 minutes in the professional and overall care of this patient.      Genevieve Pierre, APRN-CNP

## 2025-03-04 NOTE — PROGRESS NOTES
"Senait Narvaez is a 55 y.o. female on day 2 of admission presenting with Cardiomyopathy, ischemic.      Subjective   She reports stable vision in both eyes, left still blurry. Denies any flashes, floaters, sudden vision loss.        Objective     Last Recorded Vitals  Blood pressure 101/68, pulse 67, temperature 36.5 °C (97.7 °F), temperature source Temporal, resp. rate 16, height 1.651 m (5' 5\"), weight 95.7 kg (210 lb 15.7 oz), SpO2 97%.    Physical Exam  Base Eye Exam       Visual Acuity (Snellen - Linear)         Right Left    Near sc 20/50 ph 20/30-2 20/200 ph 20/70-1              Tonometry (Tonopen, 12:59 PM)         Right Left    Pressure 13 9              Pupils         Dark Light Shape React APD    Right 3 2 Round Brisk None    Left 7 7 Round Pharm dilated None              Visual Fields (Counting fingers)         Left Right     Full Full              Extraocular Movement         Right Left     Full, Ortho Full, Ortho              Neuro/Psych       Oriented x3: Yes    Mood/Affect: Normal                  Slit Lamp and Fundus Exam       External Exam         Right Left    External Normal Normal              Slit Lamp Exam         Right Left    Lids/Lashes Good Position Good Position    Conjunctiva/Sclera White and quiet, button hole at 2:00 White and quiet    Cornea Clear Clear    Anterior Chamber Deep and quiet Deep and quiet    Iris Round and reactive Round and reactive    Lens Clear Clear    Anterior Vitreous Normal               Fundus Exam         Right Left    Posterior Vitreous  vitreous hemorrhage with dehemaglobanized appearance, layering inferiorly    Disc Pink and sharp, no active NVD, residual fibrotic remnant attached to nerve Normal    C/D Ratio .3 0.3    Macula Normal Normal    Vessels Normal Normal    Periphery 360 PRP Superior and nasal clearing. 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   - Recommend elevated head of bed, avoid heavy bending or lifting  - Avoid NSAIDs if able. Continue medically necessary blood thinners.   - Continue atropine eye drops at bedtime left eye  - Ophthalmology will re-evaluate daily as blood settles.     Patient examined and discussed with retina attending Dr. Biggs.     Narendra Ly PGY3     Ophthalmology Adult Pager - [phone]  Ophthalmology Pediatrics Pager - [phone]     For adult follow-up appointments, call: [phone]  For pediatric follow-up appointments, call: [phone]     NOTE: This note is not finalized until attending reviews and signs.

## 2025-03-05 LAB
ALBUMIN SERPL BCP-MCNC: 2.9 G/DL (ref 3.4–5)
ALP SERPL-CCNC: 104 U/L (ref 33–110)
ALT SERPL W P-5'-P-CCNC: 8 U/L (ref 7–45)
ANION GAP SERPL CALC-SCNC: 11 MMOL/L (ref 10–20)
AST SERPL W P-5'-P-CCNC: 13 U/L (ref 9–39)
BASOPHILS # BLD MANUAL: 0 X10*3/UL (ref 0–0.1)
BASOPHILS NFR BLD MANUAL: 0 %
BILIRUB SERPL-MCNC: 1.5 MG/DL (ref 0–1.2)
BUN SERPL-MCNC: 15 MG/DL (ref 6–23)
BURR CELLS BLD QL SMEAR: NORMAL
CALCIUM SERPL-MCNC: 7.7 MG/DL (ref 8.6–10.6)
CHLORIDE SERPL-SCNC: 101 MMOL/L (ref 98–107)
CO2 SERPL-SCNC: 29 MMOL/L (ref 21–32)
CREAT SERPL-MCNC: 1.17 MG/DL (ref 0.5–1.05)
EGFRCR SERPLBLD CKD-EPI 2021: 55 ML/MIN/1.73M*2
EOSINOPHIL # BLD MANUAL: 0.32 X10*3/UL (ref 0–0.7)
EOSINOPHIL NFR BLD MANUAL: 3.6 %
ERYTHROCYTE [DISTWIDTH] IN BLOOD BY AUTOMATED COUNT: 21 % (ref 11.5–14.5)
GLUCOSE SERPL-MCNC: 100 MG/DL (ref 74–99)
HCT VFR BLD AUTO: 25.7 % (ref 36–46)
HGB BLD-MCNC: 8.5 G/DL (ref 12–16)
HGB RETIC QN: 26 PG (ref 28–38)
HOLD SPECIMEN: NORMAL
HYPOCHROMIA BLD QL SMEAR: NORMAL
IMM GRANULOCYTES # BLD AUTO: 0.03 X10*3/UL (ref 0–0.7)
IMM GRANULOCYTES NFR BLD AUTO: 0.3 % (ref 0–0.9)
IMMATURE RETIC FRACTION: 38.9 %
INR PPP: 1 (ref 0.9–1.1)
LDH SERPL L TO P-CCNC: 204 U/L (ref 84–246)
LYMPHOCYTES # BLD MANUAL: 2 X10*3/UL (ref 1.2–4.8)
LYMPHOCYTES NFR BLD MANUAL: 22.5 %
MCH RBC QN AUTO: 28.8 PG (ref 26–34)
MCHC RBC AUTO-ENTMCNC: 33.1 G/DL (ref 32–36)
MCV RBC AUTO: 87 FL (ref 80–100)
MONOCYTES # BLD MANUAL: 0.16 X10*3/UL (ref 0.1–1)
MONOCYTES NFR BLD MANUAL: 1.8 %
NEUTS SEG # BLD MANUAL: 6.26 X10*3/UL (ref 1.2–7)
NEUTS SEG NFR BLD MANUAL: 70.3 %
NRBC BLD-RTO: 21.2 /100 WBCS (ref 0–0)
PLATELET # BLD AUTO: 148 X10*3/UL (ref 150–450)
POLYCHROMASIA BLD QL SMEAR: NORMAL
POTASSIUM SERPL-SCNC: 4.3 MMOL/L (ref 3.5–5.3)
PROT SERPL-MCNC: 5.5 G/DL (ref 6.4–8.2)
PROTHROMBIN TIME: 11.3 SECONDS (ref 9.8–12.4)
RBC # BLD AUTO: 2.95 X10*6/UL (ref 4–5.2)
RBC MORPH BLD: NORMAL
RETICS #: 0.24 X10*6/UL (ref 0.02–0.08)
RETICS/RBC NFR AUTO: 8.1 % (ref 0.5–2)
SODIUM SERPL-SCNC: 137 MMOL/L (ref 136–145)
TARGETS BLD QL SMEAR: NORMAL
TOTAL CELLS COUNTED BLD: 111
VARIANT LYMPHS # BLD MANUAL: 0.16 X10*3/UL (ref 0–0.5)
VARIANT LYMPHS NFR BLD: 1.8 %
WBC # BLD AUTO: 8.9 X10*3/UL (ref 4.4–11.3)

## 2025-03-05 PROCEDURE — 1200000003 HC ONCOLOGY  ROOM WITH TELEMETRY DAILY

## 2025-03-05 PROCEDURE — 85027 COMPLETE CBC AUTOMATED: CPT

## 2025-03-05 PROCEDURE — 36415 COLL VENOUS BLD VENIPUNCTURE: CPT

## 2025-03-05 PROCEDURE — 2500000004 HC RX 250 GENERAL PHARMACY W/ HCPCS (ALT 636 FOR OP/ED)

## 2025-03-05 PROCEDURE — 99233 SBSQ HOSP IP/OBS HIGH 50: CPT

## 2025-03-05 PROCEDURE — 85007 BL SMEAR W/DIFF WBC COUNT: CPT

## 2025-03-05 PROCEDURE — 85610 PROTHROMBIN TIME: CPT

## 2025-03-05 PROCEDURE — 85045 AUTOMATED RETICULOCYTE COUNT: CPT

## 2025-03-05 PROCEDURE — 83615 LACTATE (LD) (LDH) ENZYME: CPT

## 2025-03-05 PROCEDURE — 2500000001 HC RX 250 WO HCPCS SELF ADMINISTERED DRUGS (ALT 637 FOR MEDICARE OP)

## 2025-03-05 PROCEDURE — 08QF3ZZ REPAIR LEFT RETINA, PERCUTANEOUS APPROACH: ICD-10-PCS

## 2025-03-05 PROCEDURE — 80053 COMPREHEN METABOLIC PANEL: CPT

## 2025-03-05 RX ADMIN — FOLIC ACID 1 MG: 1 TABLET ORAL at 08:43

## 2025-03-05 RX ADMIN — HYDROMORPHONE HYDROCHLORIDE 1 MG: 1 INJECTION, SOLUTION INTRAMUSCULAR; INTRAVENOUS; SUBCUTANEOUS at 06:45

## 2025-03-05 RX ADMIN — ENOXAPARIN SODIUM 100 MG: 100 INJECTION SUBCUTANEOUS at 08:43

## 2025-03-05 RX ADMIN — METOPROLOL TARTRATE 25 MG: 25 TABLET, FILM COATED ORAL at 20:51

## 2025-03-05 RX ADMIN — HYDROMORPHONE HYDROCHLORIDE 1 MG: 1 INJECTION, SOLUTION INTRAMUSCULAR; INTRAVENOUS; SUBCUTANEOUS at 08:45

## 2025-03-05 RX ADMIN — HYDROMORPHONE HYDROCHLORIDE 1 MG: 1 INJECTION, SOLUTION INTRAMUSCULAR; INTRAVENOUS; SUBCUTANEOUS at 21:29

## 2025-03-05 RX ADMIN — HYDROMORPHONE HYDROCHLORIDE 1 MG: 1 INJECTION, SOLUTION INTRAMUSCULAR; INTRAVENOUS; SUBCUTANEOUS at 10:53

## 2025-03-05 RX ADMIN — HYDROMORPHONE HYDROCHLORIDE 1 MG: 1 INJECTION, SOLUTION INTRAMUSCULAR; INTRAVENOUS; SUBCUTANEOUS at 02:15

## 2025-03-05 RX ADMIN — HYDROMORPHONE HYDROCHLORIDE 1 MG: 1 INJECTION, SOLUTION INTRAMUSCULAR; INTRAVENOUS; SUBCUTANEOUS at 12:53

## 2025-03-05 RX ADMIN — CETIRIZINE HYDROCHLORIDE 10 MG: 10 TABLET, FILM COATED ORAL at 08:43

## 2025-03-05 RX ADMIN — HYDROMORPHONE HYDROCHLORIDE 1 MG: 1 INJECTION, SOLUTION INTRAMUSCULAR; INTRAVENOUS; SUBCUTANEOUS at 04:17

## 2025-03-05 RX ADMIN — HYDROMORPHONE HYDROCHLORIDE 1 MG: 1 INJECTION, SOLUTION INTRAMUSCULAR; INTRAVENOUS; SUBCUTANEOUS at 17:06

## 2025-03-05 RX ADMIN — HYDROMORPHONE HYDROCHLORIDE 1 MG: 1 INJECTION, SOLUTION INTRAMUSCULAR; INTRAVENOUS; SUBCUTANEOUS at 15:06

## 2025-03-05 RX ADMIN — METOPROLOL TARTRATE 25 MG: 25 TABLET, FILM COATED ORAL at 08:43

## 2025-03-05 RX ADMIN — ATROPINE SULFATE 1 DROP: 10 SOLUTION/ DROPS OPHTHALMIC at 20:52

## 2025-03-05 RX ADMIN — FUROSEMIDE 20 MG: 20 TABLET ORAL at 08:43

## 2025-03-05 RX ADMIN — CYCLOBENZAPRINE 10 MG: 10 TABLET, FILM COATED ORAL at 20:51

## 2025-03-05 RX ADMIN — TRIAMCINOLONE ACETONIDE: 1 CREAM TOPICAL at 08:45

## 2025-03-05 RX ADMIN — HYDROMORPHONE HYDROCHLORIDE 1 MG: 1 INJECTION, SOLUTION INTRAMUSCULAR; INTRAVENOUS; SUBCUTANEOUS at 00:08

## 2025-03-05 RX ADMIN — HYDROMORPHONE HYDROCHLORIDE 1 MG: 1 INJECTION, SOLUTION INTRAMUSCULAR; INTRAVENOUS; SUBCUTANEOUS at 19:27

## 2025-03-05 RX ADMIN — ENOXAPARIN SODIUM 100 MG: 100 INJECTION SUBCUTANEOUS at 20:51

## 2025-03-05 ASSESSMENT — PAIN SCALES - GENERAL
PAINLEVEL_OUTOF10: 7
PAINLEVEL_OUTOF10: 7
PAINLEVEL_OUTOF10: 8
PAINLEVEL_OUTOF10: 7
PAINLEVEL_OUTOF10: 8
PAINLEVEL_OUTOF10: 8
PAINLEVEL_OUTOF10: 7
PAINLEVEL_OUTOF10: 8
PAINLEVEL_OUTOF10: 7
PAINLEVEL_OUTOF10: 8
PAINLEVEL_OUTOF10: 7
PAINLEVEL_OUTOF10: 8
PAINLEVEL_OUTOF10: 8
PAINLEVEL_OUTOF10: 7

## 2025-03-05 ASSESSMENT — COGNITIVE AND FUNCTIONAL STATUS - GENERAL
MOBILITY SCORE: 24
DAILY ACTIVITIY SCORE: 24

## 2025-03-05 ASSESSMENT — PAIN - FUNCTIONAL ASSESSMENT
PAIN_FUNCTIONAL_ASSESSMENT: 0-10

## 2025-03-05 ASSESSMENT — PAIN SCALES - PAIN ASSESSMENT IN ADVANCED DEMENTIA (PAINAD): TOTALSCORE: MEDICATION (SEE MAR)

## 2025-03-05 ASSESSMENT — PAIN DESCRIPTION - LOCATION: LOCATION: GENERALIZED

## 2025-03-05 NOTE — PROCEDURES
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    Risks, Benefits, Alternatives of therapy discussed with patient, who wishes to proceed, documented informed consent. Discussed goal and procedure.    Topical anesthesia with tetracaine. Dilation with tropicamide 1% and phenylephrine 10%    Laser: VITA. Panretinal Lens used.    Settings: Power: 300-350 mJ, duration 40-50 ms, Spots: [id number]    Patient was very pain sensitive when power titrated to 350 mJ. At lower power takes were not as effective. Superior and nasal periphery only lasered due to persistent vitreous hemorrhage. Low amount of uptake overall with hazy view, possibly that vitreous hemorrhage has not cleared enough for effective laser.    No complications. No post-procedure drops

## 2025-03-05 NOTE — PROGRESS NOTES
"Senait Narvaez is a 55 y.o. female on day 3 of admission presenting with Cardiomyopathy, ischemic.    Subjective   Seen at bedside this morning. States that pain is still heightened from yesterday, generalized but mostly in arms and legs. She does not feel ready to start weaning dilaudid today, agreeable to start tomorrow pending improvement in pain. States left eye vision unchanged from admission, unable to see objects clearly, endorses floaters and red spots in vision, no eye pain. Discussed plan with ophthalmology intervention today or tomorrow but unsure of time and discussed warfarin bridge today. Denies chest pain, SOB, abdominal pain, n/v/c/d, headache, fever, chills.        Objective     Physical Exam  Constitutional:       Appearance: Normal appearance.   HENT:      Head: Normocephalic.      Nose: Nose normal.      Mouth/Throat:      Mouth: Mucous membranes are moist.   Eyes:      General: Lids are normal. Visual field deficit present.      Extraocular Movements: Extraocular movements intact.      Conjunctiva/sclera: Conjunctivae normal.   Cardiovascular:      Rate and Rhythm: Normal rate and regular rhythm.      Pulses: Normal pulses.      Heart sounds: Normal heart sounds.   Pulmonary:      Effort: Pulmonary effort is normal.      Breath sounds: Normal breath sounds.   Abdominal:      General: Abdomen is flat. Bowel sounds are normal.      Palpations: Abdomen is soft.   Musculoskeletal:         General: Normal range of motion.      Cervical back: Normal range of motion.   Skin:     General: Skin is warm.   Neurological:      General: No focal deficit present.      Mental Status: She is alert and oriented to person, place, and time.   Psychiatric:         Mood and Affect: Mood normal.         Behavior: Behavior normal.         Last Recorded Vitals  Blood pressure 101/66, pulse 72, temperature 36.6 °C (97.9 °F), temperature source Temporal, resp. rate 16, height 1.651 m (5' 5\"), weight 95.7 kg (210 lb 15.7 " oz), SpO2 94%.  Intake/Output last 3 Shifts:  I/O last 3 completed shifts:  In: - (0 mL/kg)   Out: 200 (2.1 mL/kg) [Urine:200 (0.1 mL/kg/hr)]  Weight: 95.7 kg     Relevant Results  Lower extremity venous duplex left    Result Date: 3/4/2025            [place] [id number]   Tel [phone] and Fax [phone]  Vascular Lab Report [facility] US LOWER EXTREMITY VENOUS DUPLEX LEFT  Patient Name:      KIRSTYRANDELL Saenz Physician:  [id number] Raiza Brown MD Study Date:        3/4/2025             Ordering Physician: [id number] RENETTA HOFFMAN MRN/PID:           [id number]             Technologist:       Matheus Bender RVT Accession#:        [id number]         Technologist 2: Date of Birth/Age: 1970 / 55 years Encounter#:         [id number] Gender:            F Admission Status:  Inpatient            Location Performed: [facility]  Diagnosis/ICD: Localized (leg) edema-R60.0 Indication:    Limb swelling CPT Codes:     72387 Peripheral venous duplex scan for DVT Limited  CONCLUSIONS: Right Lower Venous: The right common femoral vein demonstrates normal spontaneous and respirophasic flow. The right common femoral vein was visualized in segments due to IV/bandage. Left Lower Venous: No evidence of acute deep vein thrombus visualized in the left lower extremity. Lymph nodes noted in the left groin. Technically difficult study due to patient intolerance to compressions.  Imaging & Doppler Findings:  Right        Flow CFV   Spontaneous/Phasic  Left                  Compress Thrombus        Flow Distal External Iliac   Yes      None       Pulsatile CFV                     Yes      None   Spontaneous/Phasic PFV                     Yes      None FV Proximal             Yes      None   Spontaneous/Phasic FV Mid                  Yes      None FV  Distal               Yes      None Popliteal               Yes      None   Spontaneous/Phasic Peroneal                Yes      None PTV                     Yes      None  [id number] Raiza Brown MD Electronically signed by [id number] Raiza Brown MD on 3/4/2025 at 10:50:37 PM  ** Final **     IR CVC nontunneled    Result Date: 3/3/2025  Interpreted By:  Arnoldo Noonan  and Jacqueline Dave STUDY: IR CVC NONTUNNELED;  3/3/2025 11:20 am   INDICATION: Signs/Symptoms:apheresis line placement for hb exchange therapy scheduled for 3/3.     COMPARISON: None.   ACCESSION NUMBER(S): [id number]   ORDERING CLINICIAN: BILL LEONARD   TECHNIQUE: INTERVENTIONALIST(S): Arnoldo Noonan MD   RADIOLOGY RESIDENT: Jolie Phillips MD   CONSENT: The patient/patient's POA/next of kin was informed of the nature of the proposed procedure. The purposes, alternatives, risks, and benefits were explained and discussed. All questions were answered and consent was obtained.   RADIATION EXPOSURE: Fluoroscopy time: 0.6 min. Dose: 17.88 mGy.   SEDATION: Moderate conscious IV sedation services (supervision of administration, induction, and maintenance) were provided by the physician performing the procedure with intravenous fentanyl 100 mcg and versed 2 mg for 20 minutes. The physician was assisted by an independent trained observer, an interventional radiology nurse, in the continuous monitoring of patient level of consciousness and physiologic status.   MEDICATION/CONTRAST: No additional   TIME OUT: A time out was performed immediately prior to procedure start with the interventional team, correctly identifying the patient name, date of birth, MRN, procedure, anatomy (including marking of site and side), patient position, procedure consent form, relevant laboratory and imaging test results, antibiotic administration, safety precautions, and procedure-specific equipment needs.   COMPLICATIONS: No immediate adverse events identified.   FINDINGS: The  patient was positioned supine on the angiography table. The right  groin cutaneous tissues were prepared and draped in sterile manner.  1% lidocaine local anesthesia was instilled into the subcutaneous soft tissues at the selected access site for local anesthesia. Ultrasound images demonstrate a patent  right  common femoral vein. Utilizing direct ultrasound guidance and micropuncture/Seldinger technique, the  right  common femoral vein was accessed. An ultrasound digital spot image was acquired and stored on the  PACS. After confirmation of location, a 018 [place]-Mandrel guidewire was introduced and advanced into the inferior vena cava utilizing intermittent fluoroscopy.   The needle was removed with the guidewire left in place and exchanged for a 5-Beninese coaxial dilator.  The inner dilator and wire were removed and a Amplatz 035 guidewire was introduced through the access sheath.  The skin tract was dilated with successive increase in size of vascular dilators under direct fluoroscopic visualization.   Subsequently, a temporary 13 Beninese x 30 cm catheter was advanced with its tip overlying the inferior vena cava under direct fluoroscopic guidance.  The catheter ports were charged with heparin. The external portions of the catheter were secured in place with sterile dressings.   The patient tolerated the procedure without complication.       1.  Technically uneventful placement of a   right  common femoral vein temporary trialysis catheter under direct ultrasound and fluoroscopic guidance - optimal catheter tip position in the inferior vena cava and the catheter is ready for utilization.   I was present for and/or performed the critical portions of the procedure and immediately available throughout the entire procedure.   I personally reviewed the images/study and I agree with the findings as stated by Resident Dr. Jolie Phillips MD. This study was interpreted at [facility],  [place], [place].   MACRO: None   Signed by: Arnoldo Noonan 3/3/2025 11:39 AM Dictation workstation:   [id number]     Scheduled medications  atropine, 1 drop, Left Eye, Nightly  cetirizine, 10 mg, oral, Daily  enoxaparin, 100 mg, subcutaneous, BID  folic acid, 1 mg, oral, Daily  furosemide, 20 mg, oral, Every other day  metoprolol tartrate, 25 mg, oral, BID  polyethylene glycol, 17 g, oral, Daily  sennosides, 2 tablet, oral, BID  triamcinolone, , Topical, BID      Continuous medications       PRN medications  PRN medications: albuterol, cyclobenzaprine, fluticasone, HYDROmorphone, ondansetron, oxygen, traZODone  Results for orders placed or performed during the hospital encounter of 03/02/25 (from the past 24 hours)   Screen Opiate/Opioid/Benzo Prescription Compliance   Result Value Ref Range    Creatinine, Urine Random 177.7 20.0 - 320.0 mg/dL    Amphetamine Screen, Urine Presumptive Negative Presumptive Negative    Barbiturate Screen, Urine Presumptive Negative Presumptive Negative    Cannabinoid Screen, Urine Presumptive Positive (A) Presumptive Negative    Cocaine Metabolite Screen, Urine Presumptive Negative Presumptive Negative    PCP Screen, Urine Presumptive Negative Presumptive Negative   Urinalysis with Reflex Culture and Microscopic   Result Value Ref Range    Color, Urine Yellow Light-Yellow, Yellow, Dark-Yellow    Appearance, Urine Clear Clear    Specific Gravity, Urine 1.017 1.005 - 1.035    pH, Urine 6.5 5.0, 5.5, 6.0, 6.5, 7.0, 7.5, 8.0    Protein, Urine 10 (TRACE) NEGATIVE, 10 (TRACE), 20 (TRACE) mg/dL    Glucose, Urine Normal Normal mg/dL    Blood, Urine NEGATIVE NEGATIVE mg/dL    Ketones, Urine NEGATIVE NEGATIVE mg/dL    Bilirubin, Urine NEGATIVE NEGATIVE mg/dL    Urobilinogen, Urine 2 (1+) (A) Normal mg/dL    Nitrite, Urine NEGATIVE NEGATIVE    Leukocyte Esterase, Urine NEGATIVE NEGATIVE   Extra Urine Gray Tube   Result Value Ref Range    Extra Tube Hold for add-ons.    Urinalysis Microscopic    Result Value Ref Range    WBC, Urine NONE 1-5, NONE /HPF    RBC, Urine NONE NONE, 1-2, 3-5 /HPF    Squamous Epithelial Cells, Urine 1-9 (SPARSE) Reference range not established. /HPF    Mucus, Urine FEW Reference range not established. /LPF   Calcium, ionized   Result Value Ref Range    POCT Calcium, Ionized 1.10 1.1 - 1.33 mmol/L   CBC and Auto Differential   Result Value Ref Range    WBC 8.9 4.4 - 11.3 x10*3/uL    nRBC 21.2 (H) 0.0 - 0.0 /100 WBCs    RBC 2.95 (L) 4.00 - 5.20 x10*6/uL    Hemoglobin 8.5 (L) 12.0 - 16.0 g/dL    Hematocrit 25.7 (L) 36.0 - 46.0 %    MCV 87 80 - 100 fL    MCH 28.8 26.0 - 34.0 pg    MCHC 33.1 32.0 - 36.0 g/dL    RDW 21.0 (H) 11.5 - 14.5 %    Platelets 148 (L) 150 - 450 x10*3/uL    Immature Granulocytes %, Automated 0.3 0.0 - 0.9 %    Immature Granulocytes Absolute, Automated 0.03 0.00 - 0.70 x10*3/uL   Comprehensive Metabolic Panel   Result Value Ref Range    Glucose 100 (H) 74 - 99 mg/dL    Sodium 137 136 - 145 mmol/L    Potassium 4.3 3.5 - 5.3 mmol/L    Chloride 101 98 - 107 mmol/L    Bicarbonate 29 21 - 32 mmol/L    Anion Gap 11 10 - 20 mmol/L    Urea Nitrogen 15 6 - 23 mg/dL    Creatinine 1.17 (H) 0.50 - 1.05 mg/dL    eGFR 55 (L) >60 mL/min/1.73m*2    Calcium 7.7 (L) 8.6 - 10.6 mg/dL    Albumin 2.9 (L) 3.4 - 5.0 g/dL    Alkaline Phosphatase 104 33 - 110 U/L    Total Protein 5.5 (L) 6.4 - 8.2 g/dL    AST 13 9 - 39 U/L    Bilirubin, Total 1.5 (H) 0.0 - 1.2 mg/dL    ALT 8 7 - 45 U/L   Lactate Dehydrogenase   Result Value Ref Range     84 - 246 U/L   Reticulocytes   Result Value Ref Range    Retic % 8.1 (H) 0.5 - 2.0 %    Retic Absolute 0.240 (H) 0.018 - 0.083 x10*6/uL    Reticulocyte Hemoglobin 26 (L) 28 - 38 pg    Immature Retic fraction 38.9 (H) <=16.0 %   Protime-INR   Result Value Ref Range    Protime 11.3 9.8 - 12.4 seconds    INR 1.0 0.9 - 1.1   Manual Differential   Result Value Ref Range    Neutrophils %, Manual 70.3 40.0 - 80.0 %    Lymphocytes %, Manual 22.5 13.0 -  44.0 %    Monocytes %, Manual 1.8 2.0 - 10.0 %    Eosinophils %, Manual 3.6 0.0 - 6.0 %    Basophils %, Manual 0.0 0.0 - 2.0 %    Atypical Lymphocytes %, Manual 1.8 0.0 - 2.0 %    Seg Neutrophils Absolute, Manual 6.26 1.20 - 7.00 x10*3/uL    Lymphocytes Absolute, Manual 2.00 1.20 - 4.80 x10*3/uL    Monocytes Absolute, Manual 0.16 0.10 - 1.00 x10*3/uL    Eosinophils Absolute, Manual 0.32 0.00 - 0.70 x10*3/uL    Basophils Absolute, Manual 0.00 0.00 - 0.10 x10*3/uL    Atypical Lymphs Absolute, Manual 0.16 0.00 - 0.50 x10*3/uL    Total Cells Counted 111     RBC Morphology See Below     Polychromasia Mild     Hypochromia Mild     Target Cells Few     Sandra Cells Few      *Note: Due to a large number of results and/or encounters for the requested time period, some results have not been displayed. A complete set of results can be found in Results Review.     This patient has a central line   Reason for the central line remaining today? 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 Patient also presents with painless L eye vision loss w/floaters and blurriness is most concerning for a retinal detachment and acute retinopathy in the setting of SCD. IR consulted and placed apheresis line 3/3, s/p routine exchange 3/3. Ophthalmology emergently consulted for evaluation concerned for vitreous hemorrhage possible retinol detachment from sickle retinopathy. Started on atropine eye drops. Ophthalmology  recommend panretinal photocoagulation 3/5 or 3/6. Started on low-intensity heparin ggt; warfarin not restarted on admission while waiting for ophtho recs.  Started bridge to home warfarin 3/4 with lovenox per pharmacy recs (ok'd by ophtho). CTH wo con 3/2 to rule out hemorrhagic stroke, no acute findings. MRI brain 3/3 to evaluate for acute ischemic stroke, no evidence of acute infarct, intracranial mass effect or midline shift. Chronic sickle cell changes present but no acute findings. Discharge home pending pain control and ophthalmology intervention.    Updates 3/5:  - Ok from ophthalmology perspective to resume home warfarin, discontinue heparin gtt  - Ophthalmology plans panretinal photocoagulation 3/5 or 3/6, done in ophthalmology clinic, does not need to be NPO  - s/p exchange 3/3 with worsening pain 3/4, dilaudid 1 mg IV frequency increased to q 2 hours; pain still elevated on 3/5, will attempt to wean 3/6   - started warfarin + lovenox per pharmacy evening of 3/4 with plan to dc lovenox once INR 2-3; daily PT/INR   - will pull apheresis line this afternoon 3/5      # Painless vision loss in left eye, c/f acute retinal detachment, SCD-related retinopathy  -STAT consulted ophthalmology likely vitreous hemorrhage possible retinol detachment from sickle retinopathy.   -STAT CTH (3/2) wo neg for acute findings   -MRI brain w/wo con (3/3) showing chronic changes with no acute findings   -Started on atropine eye drops 3/2-current  - Ophthalmology plans panretinal photocoagulation 3/5 or 3/6, done in ophthalmology clinic, does not need to be NPO  - Ok from ophthalmology perspective to resume home warfarin, discontinue heparin gtt    # Hb SC disease   #Acute on chronic SCD related pain crisis  - currently on q6 week RBC exchange transfusions (most recent 1/20/25); done 3/3/25  - [facility] reviewed, last updated 7/25/24  - [place] reviewed, no aberrant behavior noted (last filled oxycodone 2/25 x12 days qty 75 tabs)  -  Hemolysis labs are at baseline, pre-exchange labs obtained 2/28/25  - Hgb baseline ~9-10; 9.5 (3/4) --> 8.5 (3/5); CTM    - Hgb S: pre-exchange 36.1%; post-exchange 13.5% (3/4)   - s/p routine exchange 3/3  - s/p dilaudid 1 mg q3h prn for severe pain; IV dilaudid 0.5 mg q2h for moderate pain  - Increased dilaudid frequency to 1 mg IV q 2 hours for increased pain (3/4 - ); will attempt to wean on 3/6   - Cyclobenzaprine 10 mg every 8 hours prn   - Bowel regimen for opioid induced constipation with Senna 2tabs BID and Miralax daily, PO Benadryl 25 mg q6h PRN for opioid-induced pruritus, PO Zofran 8 mg q8h PRN for opioid-induced nausea  - continue 2L night time oxygen   - c/w home folic acid 1 mg daily  - Utox: + cannabinoid (3/4)   - IS encouraged      # CHARLES  - Etiology likely pre-renal secondary to exchange 3/3, no new medications initiated this admission  - CT and MRI done 3/2 without contrast  - sCR 1.38 (3/4) --> 1.17 (3/5)   - s/p 500 mL bolus D51/2NS 3/4   - UA (3/4) negative     # L Breast Mass, s/p bx 1/31, low suspicion for malignancy  - Established with [facility], s/p bx, follow up in 6 months (scheduled for 8/2025)     #DVT, PE  # SVC thrombus  - On warfarin at home, most recent schedule was 5 mg nightly except on Tuesdays she will take 7.5 mg. She does not take warfarin on Saturday and Sundays.   - S/p low-intensity heparin gtt for line placement (3/3-planned dc 3/4 evening)  - Per [facility] ok to bridge to home warfarin on 3/4  - Per pharmacy 3/4, resumed warfarin at last dose (5 mg), continue heparin gtt until first lovenox 100 mg BID given at 2100 on 3/4, discontinue heparin gtt at 2200; monitor daily PT/INR for warfarin/lovenox adjustments until INR goal 2-3  - s/p ultrasound BLE for swelling (3/4), preliminary results show no acute DVT     # hx SVT  # pHTN  - c/w home metop 25 mg BID and Lasix 20 every other day   - telemetry       #DISPO  - Full code - confirmed on admit  - DC home pending  completed hb exchange and pain control and L eye intervention   - SURROGATE DECISION MAKER: Daughter Tati (mom) [phone]; pt also has a daughter.  - Sickle cell FUV 3/10 w/ Dr. Whaley, Cardiology 3/12       Assessment and plan as above discussed with attending physician Dr. Shellie MULLINS spent 60 minutes in the professional and overall care of this patient.    Sonia Kerns, APRN-CNP

## 2025-03-05 NOTE — PROGRESS NOTES
"Senait Narvaez is a 55 y.o. female on day 3 of admission presenting with Cardiomyopathy, ischemic.      Subjective   Today she feels her vision is stable, maybe clearing up in the left eye near the top of her vision. She is nervous about pain related to panretinal photocoagulation (PRP) but is willing to move forward with this procedure today.       Objective     Last Recorded Vitals  Blood pressure 96/59, pulse 64, temperature 36.4 °C (97.5 °F), temperature source Temporal, resp. rate 16, height 1.651 m (5' 5\"), weight 95.7 kg (210 lb 15.7 oz), SpO2 95%.    Physical Exam  Base Eye Exam       Visual Acuity (Snellen - Linear)         Right Left    Near cc 20/30 20/200 ph 20/50+2              Tonometry (Tonopen, 12:47 PM)         Right Left    Pressure 13 9              Pupils         Dark Light Shape React APD    Right 3 2 Round Brisk None    Left 7 7 Round Pharm dilated None              Visual Fields (Counting fingers)         Left Right     Full Full              Extraocular Movement         Right Left     Full, Ortho Full, Ortho              Neuro/Psych       Oriented x3: Yes    Mood/Affect: Normal                  Slit Lamp and Fundus Exam       External Exam         Right Left    External Normal Normal              Slit Lamp Exam         Right Left    Lids/Lashes Good Position Good Position    Conjunctiva/Sclera White and quiet, button hole at 2:00 White and quiet    Cornea Clear Clear    Anterior Chamber Deep and quiet Deep and quiet    Iris Round and reactive Round and reactive    Lens Clear Clear    Anterior Vitreous Normal               Fundus Exam         Right Left    Posterior Vitreous  vitreous hemorrhage with dehemaglobanized appearance, layering inferiorly    Disc  Normal    C/D Ratio  0.3    Macula  Normal    Vessels  Normal    Periphery  Superior and nasal clearing. 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   - Recommend elevated head of bed, avoid heavy bending or lifting  - Avoid NSAIDs if able. Continue medically necessary blood thinners.   - Recommend panretinal photocoagulation (PRP) left eye today in clinic. Note to follow  - Continue atropine eye drops at bedtime left eye  - Ophthalmology will re-evaluate daily as blood settles.     Patient discussed with retina attending Dr. Biggs.     Narendra Ly PGY3     Ophthalmology Adult Pager - [phone]  Ophthalmology Pediatrics Pager - [phone]     For adult follow-up appointments, call: [phone]  For pediatric follow-up appointments, call: [phone]     NOTE: This note is not finalized until attending reviews and signs.

## 2025-03-05 NOTE — CARE PLAN
The patient's goals for the shift include      The clinical goals for the shift include Pt will have decreased pain through end of shift 3/5/2025 1900      Problem: Pain - Adult  Goal: Verbalizes/displays adequate comfort level or baseline comfort level  Outcome: Progressing     Problem: Safety - Adult  Goal: Free from fall injury  Outcome: Progressing     Problem: Discharge Planning  Goal: Discharge to home or other facility with appropriate resources  Outcome: Progressing     Problem: Chronic Conditions and Co-morbidities  Goal: Patient's chronic conditions and co-morbidity symptoms are monitored and maintained or improved  Outcome: Progressing     Problem: Nutrition  Goal: Nutrient intake appropriate for maintaining nutritional needs  Outcome: Progressing

## 2025-03-06 LAB
1OH-MIDAZOLAM UR CFM-MCNC: 866 NG/ML
6MAM UR CFM-MCNC: <25 NG/ML
7AMINOCLONAZEPAM UR CFM-MCNC: <25 NG/ML
A-OH ALPRAZ UR CFM-MCNC: <25 NG/ML
ALBUMIN SERPL BCP-MCNC: 3.1 G/DL (ref 3.4–5)
ALP SERPL-CCNC: 110 U/L (ref 33–110)
ALPRAZ UR CFM-MCNC: <25 NG/ML
ALT SERPL W P-5'-P-CCNC: 9 U/L (ref 7–45)
ANION GAP SERPL CALC-SCNC: 8 MMOL/L (ref 10–20)
AST SERPL W P-5'-P-CCNC: 19 U/L (ref 9–39)
ATRIAL RATE: 74 BPM
BASOPHILS # BLD MANUAL: 0 X10*3/UL (ref 0–0.1)
BASOPHILS NFR BLD MANUAL: 0 %
BILIRUB SERPL-MCNC: 1.3 MG/DL (ref 0–1.2)
BUN SERPL-MCNC: 12 MG/DL (ref 6–23)
CALCIUM SERPL-MCNC: 8.6 MG/DL (ref 8.6–10.6)
CHLORDIAZEP UR CFM-MCNC: <25 NG/ML
CHLORIDE SERPL-SCNC: 102 MMOL/L (ref 98–107)
CLONAZEPAM UR CFM-MCNC: <25 NG/ML
CO2 SERPL-SCNC: 33 MMOL/L (ref 21–32)
CODEINE UR CFM-MCNC: <50 NG/ML
CREAT SERPL-MCNC: 1.01 MG/DL (ref 0.5–1.05)
DIAZEPAM UR CFM-MCNC: <25 NG/ML
EDDP UR CFM-MCNC: <25 NG/ML
EGFRCR SERPLBLD CKD-EPI 2021: 65 ML/MIN/1.73M*2
EOSINOPHIL # BLD MANUAL: 0.4 X10*3/UL (ref 0–0.7)
EOSINOPHIL NFR BLD MANUAL: 5.2 %
ERYTHROCYTE [DISTWIDTH] IN BLOOD BY AUTOMATED COUNT: 21.2 % (ref 11.5–14.5)
FENTANYL UR CFM-MCNC: <2.5 NG/ML
GLUCOSE SERPL-MCNC: 82 MG/DL (ref 74–99)
HCT VFR BLD AUTO: 25.8 % (ref 36–46)
HGB BLD-MCNC: 8.5 G/DL (ref 12–16)
HGB RETIC QN: 26 PG (ref 28–38)
HYDROCODONE CTO UR CFM-MCNC: <25 NG/ML
HYDROMORPHONE UR CFM-MCNC: >2500 NG/ML
HYPOCHROMIA BLD QL SMEAR: NORMAL
IMM GRANULOCYTES # BLD AUTO: 0.03 X10*3/UL (ref 0–0.7)
IMM GRANULOCYTES NFR BLD AUTO: 0.4 % (ref 0–0.9)
IMMATURE RETIC FRACTION: 24.2 %
INR PPP: 1.1 (ref 0.9–1.1)
LDH SERPL L TO P-CCNC: 194 U/L (ref 84–246)
LORAZEPAM UR CFM-MCNC: <25 NG/ML
LYMPHOCYTES # BLD MANUAL: 1.84 X10*3/UL (ref 1.2–4.8)
LYMPHOCYTES NFR BLD MANUAL: 24.2 %
MCH RBC QN AUTO: 28.1 PG (ref 26–34)
MCHC RBC AUTO-ENTMCNC: 32.9 G/DL (ref 32–36)
MCV RBC AUTO: 85 FL (ref 80–100)
METHADONE UR CFM-MCNC: <25 NG/ML
MIDAZOLAM UR CFM-MCNC: 61 NG/ML
MONOCYTES # BLD MANUAL: 0.46 X10*3/UL (ref 0.1–1)
MONOCYTES NFR BLD MANUAL: 6 %
MORPHINE UR CFM-MCNC: <50 NG/ML
NEUTS SEG # BLD MANUAL: 4.45 X10*3/UL (ref 1.2–7)
NEUTS SEG NFR BLD MANUAL: 58.6 %
NORDIAZEPAM UR CFM-MCNC: <25 NG/ML
NORFENTANYL UR CFM-MCNC: ABNORMAL NG/ML
NORHYDROCODONE UR CFM-MCNC: <25 NG/ML
NOROXYCODONE UR CFM-MCNC: 391 NG/ML
NORTRAMADOL UR-MCNC: <50 NG/ML
NRBC BLD-RTO: 26.4 /100 WBCS (ref 0–0)
OXAZEPAM UR CFM-MCNC: <25 NG/ML
OXYCODONE UR CFM-MCNC: 96 NG/ML
OXYMORPHONE UR CFM-MCNC: 737 NG/ML
P AXIS: 70 DEGREES
P OFFSET: 182 MS
P ONSET: 133 MS
PLATELET # BLD AUTO: 188 X10*3/UL (ref 150–450)
POLYCHROMASIA BLD QL SMEAR: NORMAL
POTASSIUM SERPL-SCNC: 4.4 MMOL/L (ref 3.5–5.3)
PR INTERVAL: 162 MS
PROT SERPL-MCNC: 6.1 G/DL (ref 6.4–8.2)
PROTHROMBIN TIME: 12 SECONDS (ref 9.8–12.4)
Q ONSET: 214 MS
QRS COUNT: 12 BEATS
QRS DURATION: 74 MS
QT INTERVAL: 418 MS
QTC CALCULATION(BAZETT): 463 MS
QTC FREDERICIA: 448 MS
R AXIS: 177 DEGREES
RBC # BLD AUTO: 3.02 X10*6/UL (ref 4–5.2)
RBC MORPH BLD: NORMAL
RETICS #: 0.3 X10*6/UL (ref 0.02–0.08)
RETICS/RBC NFR AUTO: 9.9 % (ref 0.5–2)
SODIUM SERPL-SCNC: 139 MMOL/L (ref 136–145)
T AXIS: 88 DEGREES
T OFFSET: 423 MS
TARGETS BLD QL SMEAR: NORMAL
TEMAZEPAM UR CFM-MCNC: <25 NG/ML
TOTAL CELLS COUNTED BLD: 116
TRAMADOL UR CFM-MCNC: <50 NG/ML
VARIANT LYMPHS # BLD MANUAL: 0.46 X10*3/UL (ref 0–0.5)
VARIANT LYMPHS NFR BLD: 6 %
VENTRICULAR RATE: 74 BPM
WBC # BLD AUTO: 7.6 X10*3/UL (ref 4.4–11.3)
ZOLPIDEM UR CFM-MCNC: <25 NG/ML
ZOLPIDEM UR-MCNC: <25 NG/ML

## 2025-03-06 PROCEDURE — 36415 COLL VENOUS BLD VENIPUNCTURE: CPT

## 2025-03-06 PROCEDURE — G0008 ADMIN INFLUENZA VIRUS VAC: HCPCS

## 2025-03-06 PROCEDURE — 85027 COMPLETE CBC AUTOMATED: CPT

## 2025-03-06 PROCEDURE — 80053 COMPREHEN METABOLIC PANEL: CPT

## 2025-03-06 PROCEDURE — 2500000004 HC RX 250 GENERAL PHARMACY W/ HCPCS (ALT 636 FOR OP/ED)

## 2025-03-06 PROCEDURE — 99233 SBSQ HOSP IP/OBS HIGH 50: CPT

## 2025-03-06 PROCEDURE — 83615 LACTATE (LD) (LDH) ENZYME: CPT

## 2025-03-06 PROCEDURE — 1170000001 HC PRIVATE ONCOLOGY ROOM DAILY

## 2025-03-06 PROCEDURE — 85610 PROTHROMBIN TIME: CPT

## 2025-03-06 PROCEDURE — 85007 BL SMEAR W/DIFF WBC COUNT: CPT

## 2025-03-06 PROCEDURE — 2500000001 HC RX 250 WO HCPCS SELF ADMINISTERED DRUGS (ALT 637 FOR MEDICARE OP)

## 2025-03-06 PROCEDURE — 2500000002 HC RX 250 W HCPCS SELF ADMINISTERED DRUGS (ALT 637 FOR MEDICARE OP, ALT 636 FOR OP/ED)

## 2025-03-06 PROCEDURE — 2500000005 HC RX 250 GENERAL PHARMACY W/O HCPCS

## 2025-03-06 PROCEDURE — 90656 IIV3 VACC NO PRSV 0.5 ML IM: CPT

## 2025-03-06 PROCEDURE — 85045 AUTOMATED RETICULOCYTE COUNT: CPT

## 2025-03-06 RX ORDER — WARFARIN SODIUM 5 MG/1
5 TABLET ORAL DAILY
Status: DISCONTINUED | OUTPATIENT
Start: 2025-03-06 | End: 2025-03-09 | Stop reason: HOSPADM

## 2025-03-06 RX ORDER — LIDOCAINE 560 MG/1
2 PATCH PERCUTANEOUS; TOPICAL; TRANSDERMAL DAILY
Status: DISCONTINUED | OUTPATIENT
Start: 2025-03-06 | End: 2025-03-09 | Stop reason: HOSPADM

## 2025-03-06 RX ORDER — FUROSEMIDE 10 MG/ML
20 INJECTION INTRAMUSCULAR; INTRAVENOUS ONCE
Status: COMPLETED | OUTPATIENT
Start: 2025-03-06 | End: 2025-03-06

## 2025-03-06 RX ADMIN — HYDROMORPHONE HYDROCHLORIDE 1 MG: 1 INJECTION, SOLUTION INTRAMUSCULAR; INTRAVENOUS; SUBCUTANEOUS at 00:05

## 2025-03-06 RX ADMIN — FUROSEMIDE 20 MG: 10 INJECTION, SOLUTION INTRAVENOUS at 10:29

## 2025-03-06 RX ADMIN — ATROPINE SULFATE 1 DROP: 10 SOLUTION/ DROPS OPHTHALMIC at 20:13

## 2025-03-06 RX ADMIN — ENOXAPARIN SODIUM 100 MG: 100 INJECTION SUBCUTANEOUS at 08:33

## 2025-03-06 RX ADMIN — ENOXAPARIN SODIUM 100 MG: 100 INJECTION SUBCUTANEOUS at 20:13

## 2025-03-06 RX ADMIN — TRIAMCINOLONE ACETONIDE: 1 CREAM TOPICAL at 08:37

## 2025-03-06 RX ADMIN — HYDROMORPHONE HYDROCHLORIDE 1 MG: 1 INJECTION, SOLUTION INTRAMUSCULAR; INTRAVENOUS; SUBCUTANEOUS at 06:28

## 2025-03-06 RX ADMIN — LIDOCAINE 4% 2 PATCH: 40 PATCH TOPICAL at 10:27

## 2025-03-06 RX ADMIN — HYDROMORPHONE HYDROCHLORIDE 1.5 MG: 2 INJECTION, SOLUTION INTRAMUSCULAR; INTRAVENOUS; SUBCUTANEOUS at 13:38

## 2025-03-06 RX ADMIN — HYDROMORPHONE HYDROCHLORIDE 1.5 MG: 2 INJECTION, SOLUTION INTRAMUSCULAR; INTRAVENOUS; SUBCUTANEOUS at 20:10

## 2025-03-06 RX ADMIN — HYDROMORPHONE HYDROCHLORIDE 1 MG: 1 INJECTION, SOLUTION INTRAMUSCULAR; INTRAVENOUS; SUBCUTANEOUS at 04:24

## 2025-03-06 RX ADMIN — HYDROMORPHONE HYDROCHLORIDE 1 MG: 1 INJECTION, SOLUTION INTRAMUSCULAR; INTRAVENOUS; SUBCUTANEOUS at 02:05

## 2025-03-06 RX ADMIN — HYDROMORPHONE HYDROCHLORIDE 1.5 MG: 2 INJECTION, SOLUTION INTRAMUSCULAR; INTRAVENOUS; SUBCUTANEOUS at 23:15

## 2025-03-06 RX ADMIN — HYDROMORPHONE HYDROCHLORIDE 1.5 MG: 2 INJECTION, SOLUTION INTRAMUSCULAR; INTRAVENOUS; SUBCUTANEOUS at 16:55

## 2025-03-06 RX ADMIN — CETIRIZINE HYDROCHLORIDE 10 MG: 10 TABLET, FILM COATED ORAL at 08:33

## 2025-03-06 RX ADMIN — HYDROMORPHONE HYDROCHLORIDE 1.5 MG: 2 INJECTION, SOLUTION INTRAMUSCULAR; INTRAVENOUS; SUBCUTANEOUS at 10:28

## 2025-03-06 RX ADMIN — FOLIC ACID 1 MG: 1 TABLET ORAL at 08:33

## 2025-03-06 RX ADMIN — WARFARIN SODIUM 5 MG: 5 TABLET ORAL at 16:56

## 2025-03-06 RX ADMIN — INFLUENZA VIRUS VACCINE 0.5 ML: 15; 15; 15 SUSPENSION INTRAMUSCULAR at 10:28

## 2025-03-06 RX ADMIN — HYDROMORPHONE HYDROCHLORIDE 1 MG: 1 INJECTION, SOLUTION INTRAMUSCULAR; INTRAVENOUS; SUBCUTANEOUS at 08:33

## 2025-03-06 RX ADMIN — METOPROLOL TARTRATE 25 MG: 25 TABLET, FILM COATED ORAL at 20:11

## 2025-03-06 RX ADMIN — METOPROLOL TARTRATE 25 MG: 25 TABLET, FILM COATED ORAL at 08:33

## 2025-03-06 ASSESSMENT — PAIN - FUNCTIONAL ASSESSMENT

## 2025-03-06 ASSESSMENT — PAIN SCALES - GENERAL
PAINLEVEL_OUTOF10: 8
PAINLEVEL_OUTOF10: 8
PAINLEVEL_OUTOF10: 6
PAINLEVEL_OUTOF10: 7
PAINLEVEL_OUTOF10: 7
PAINLEVEL_OUTOF10: 9
PAINLEVEL_OUTOF10: 7
PAINLEVEL_OUTOF10: 6
PAINLEVEL_OUTOF10: 8
PAINLEVEL_OUTOF10: 8
PAINLEVEL_OUTOF10: 7
PAINLEVEL_OUTOF10: 8
PAINLEVEL_OUTOF10: 7
PAINLEVEL_OUTOF10: 8
PAINLEVEL_OUTOF10: 8
PAINLEVEL_OUTOF10: 7
PAINLEVEL_OUTOF10: 8
PAINLEVEL_OUTOF10: 7
PAINLEVEL_OUTOF10: 6
PAINLEVEL_OUTOF10: 8

## 2025-03-06 ASSESSMENT — COGNITIVE AND FUNCTIONAL STATUS - GENERAL
DAILY ACTIVITIY SCORE: 24
MOBILITY SCORE: 24

## 2025-03-06 ASSESSMENT — PAIN DESCRIPTION - LOCATION
LOCATION: GENERALIZED

## 2025-03-06 ASSESSMENT — PAIN SCALES - PAIN ASSESSMENT IN ADVANCED DEMENTIA (PAINAD)
TOTALSCORE: MEDICATION (SEE MAR)

## 2025-03-06 NOTE — PROGRESS NOTES
Senait Narvaez is a 55 y.o. female on day 4 of admission presenting with Cardiomyopathy, ischemic.    Subjective   Seen at bedside this morning. States that pain is slowly improving, still generalized but mostly in b/l arms and legs. States pain in left leg is worse this AM and she feels like her bl ankles are mildly swollen. States pain is down her hip-ankle and radiates to her lower back. Lower back pain is unchanged since admission. Also states she has some numbness down legs that is not new. Sensation intact. Discussed dose of IV lasix today since kidney function is improving. Agreeable to start weaning IV dilaudid with anticipation to rotate with home oxycodone tomorrow, 3/7 vs discharge.     States left eye vision unchanged from admission, unable to see objects clearly, endorses floaters and red spots in vision, no eye pain. Ophthalmology already at bedside this AM and made her aware they will re-attempt panretinal photocoagulation (PRP) left eye in the next 1-2 weeks to allow for vitreous hemorrhage to settle. Denies chest pain, SOB, abdominal pain, n/v/c/d, headache, fever, chills.     Objective     Physical Exam  Constitutional:       Appearance: Normal appearance.   HENT:      Head: Normocephalic.      Nose: Nose normal.      Mouth/Throat:      Mouth: Mucous membranes are moist.   Eyes:      General: Lids are normal. Visual field deficit present.      Extraocular Movements: Extraocular movements intact.      Conjunctiva/sclera: Conjunctivae normal.   Cardiovascular:      Rate and Rhythm: Normal rate and regular rhythm.      Pulses: Normal pulses.      Heart sounds: Normal heart sounds.   Pulmonary:      Effort: Pulmonary effort is normal.      Breath sounds: Normal breath sounds.   Abdominal:      General: Abdomen is flat. Bowel sounds are normal.      Palpations: Abdomen is soft.   Musculoskeletal:         General: Normal range of motion.      Cervical back: Normal range of motion.      Comments: Mild trace  "edema in BLE    Skin:     General: Skin is warm.   Neurological:      General: No focal deficit present.      Mental Status: She is alert and oriented to person, place, and time.   Psychiatric:         Mood and Affect: Mood normal.         Behavior: Behavior normal.       Last Recorded Vitals  Blood pressure 108/67, pulse 74, temperature 36.8 °C (98.2 °F), temperature source Temporal, resp. rate 18, height 1.651 m (5' 5\"), weight 95.7 kg (210 lb 15.7 oz), SpO2 99%.  Intake/Output last 3 Shifts:  I/O last 3 completed shifts:  In: 499.3 (5.2 mL/kg) [I.V.:499.3 (5.2 mL/kg)]  Out: - (0 mL/kg)   Weight: 95.7 kg     Relevant Results  Lower extremity venous duplex left    Result Date: 3/4/2025            [place] [address]   Tel [phone] and Fax [phone]  Vascular Lab Report [facility] LOWER EXTREMITY VENOUS DUPLEX LEFT  Patient Name:      KIRSTY Saenz Physician:  [id number] Raiza Brown MD Study Date:        3/4/2025             Ordering Physician: [id number] RENETTA HOFFMAN MRN/PID:           [id number]             Technologist:       Matheus Bender JIM Accession#:        [id number]         Technologist 2: Date of Birth/Age: 1970 / 55 years Encounter#:         [id number] Gender:            F Admission Status:  Inpatient            Location Performed: [facility]  Diagnosis/ICD: Localized (leg) edema-R60.0 Indication:    Limb swelling CPT Codes:     21089 Peripheral venous duplex scan for DVT Limited  CONCLUSIONS: Right Lower Venous: The right common femoral vein demonstrates normal spontaneous and respirophasic flow. The right common femoral vein was visualized in segments due to IV/bandage. Left Lower Venous: No evidence of acute deep vein thrombus visualized in the left lower extremity. Lymph nodes noted in the left " groin. Technically difficult study due to patient intolerance to compressions.  Imaging & Doppler Findings:  Right        Flow CFV   Spontaneous/Phasic  Left                  Compress Thrombus        Flow Distal External Iliac   Yes      None       Pulsatile CFV                     Yes      None   Spontaneous/Phasic PFV                     Yes      None FV Proximal             Yes      None   Spontaneous/Phasic FV Mid                  Yes      None FV Distal               Yes      None Popliteal               Yes      None   Spontaneous/Phasic Peroneal                Yes      None PTV                     Yes      None  [id number] Raiza Brown MD Electronically signed by [id number] Raiza Brown MD on 3/4/2025 at 10:50:37 PM  ** Final **      Scheduled medications  atropine, 1 drop, Left Eye, Nightly  cetirizine, 10 mg, oral, Daily  enoxaparin, 100 mg, subcutaneous, BID  folic acid, 1 mg, oral, Daily  furosemide, 20 mg, oral, Every other day  metoprolol tartrate, 25 mg, oral, BID  polyethylene glycol, 17 g, oral, Daily  sennosides, 2 tablet, oral, BID  triamcinolone, , Topical, BID      Continuous medications       PRN medications  PRN medications: albuterol, cyclobenzaprine, fluticasone, HYDROmorphone, ondansetron, oxygen, traZODone  No results found. However, due to the size of the patient record, not all encounters were searched. Please check Results Review for a complete set of results.    This patient has a central line   Reason for the central line remaining today? 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 Patient also presents with painless L eye vision loss w/floaters and blurriness is most concerning for a retinal detachment and acute retinopathy in the setting of SCD. IR consulted and placed apheresis line 3/3, s/p routine exchange 3/3. Ophthalmology emergently consulted for evaluation concerned for vitreous hemorrhage possible retinol detachment from sickle retinopathy. Started on atropine eye drops. Ophthalmology recommend panretinal photocoagulation. Started on low-intensity heparin ggt; warfarin not restarted on admission while waiting for ophtho recs. Started bridge to home warfarin 3/4 with lovenox per pharmacy recs (ok'd by ophtho). CTH wo con 3/2 to rule out hemorrhagic stroke, no acute findings. MRI brain 3/3 to evaluate for acute ischemic stroke, no evidence of acute infarct, intracranial mass effect or midline shift. s/p panretinal photocoagulation on 3/5 was partially successful d/t amount of vitreous hemorrhage, not able to apply adequate laser treatment. Recommended re-attempt panretinal photocoagulation (PRP) left eye in the next 1-2 weeks to allow for vitreous hemorrhage to settle. Chronic sickle cell changes present but no acute findings. Continue atropine eye drops on discharge until seen at outpt clinic. Started weaning IV dilaudid on 3/6. Discharge home pending pain control and ophthalmology intervention.    Updates 3/6:  - Did not feel ready to rotate today so weaned dilaudid from 1mg Q2hrs to 1.5mg Q3hrs; attempt to rotate 3/7 with PO oxycodone 15mg Q4 PRN with IV Dilaudid 1mg Q4 PRN for severe pain vs discharge   - Ophthalmology: s/p panretinal photocoagulation 3/5 was partially successful d/t amount of vitreous hemorrhage, not able to apply adequate laser treatment. Recommended re-attempt panretinal photocoagulation (PRP) left eye in the next 1-2 weeks to allow for  vitreous hemorrhage to settle. Ophtho to arrange FUV   - Per ophthalmology, 3/6, continue atropine drops at discharge until seen at outpt clinic   - worsening left leg pain with mild bilateral ankle edema; s/p 20mg IVP lasix   - started warfarin + lovenox per pharmacy evening of 3/4 with plan to dc lovenox once INR 2-3; daily PT/INR (INR 1.1 3/6)   - s/p exchange 3/3; pulled apheresis line 3/5     # Painless vision loss in left eye, c/f acute retinal detachment, SCD-related retinopathy- stable   - STAT consulted ophthalmology likely vitreous hemorrhage possible retinol detachment from sickle retinopathy.   - STAT CTH (3/2) wo neg for acute findings   - MRI brain w/wo con (3/3) showing chronic changes with no acute findings   - Started on atropine eye drops 3/2-current   - Ophthalmology consulted   - s/p panretinal photocoagulation on 3/5 was partially successful d/t amount of vitreous hemorrhage, not able to apply adequate laser treatment. Recommended re-attempt panretinal photocoagulation (PRP) left eye in the next 1-2 weeks to allow for vitreous hemorrhage to settle.   - Ok from ophthalmology perspective to resume home warfarin, discontinue heparin gtt 3/4   - Ophtho to arrange FUV   - Per ophthalmology, 3/6, continue atropine drops at discharge until seen at outpt clinic     # Hb SC disease   #Acute on chronic SCD related pain crisis  - currently on q6 week RBC exchange transfusions (most recent 1/20/25); done 3/3/25  - [facility] reviewed, last updated 7/25/24  - [place] reviewed, no aberrant behavior noted (last filled oxycodone 2/25 x12 days qty 75 tabs)  - Hemolysis labs are at baseline, pre-exchange labs obtained 2/28/25  - Hgb baseline ~9-10; 9.5 (3/4) --> 8.5 (3/5); CTM    - Hgb S: pre-exchange 36.1%; post-exchange 13.5% (3/4)   - s/p routine exchange 3/3; pulled apheresis line 3/5   - s/p dilaudid 1 mg q3h prn for severe pain; IV dilaudid 0.5 mg q2h for moderate pain  - s/p Increased dilaudid frequency to 1  mg IV q 2 hours for increased pain (3/4 -3/6)  - Did not feel ready to rotate 3/6 so weaned dilaudid from 1mg Q2hrs to 1.5mg Q3hrs; attempt to rotate 3/7 with PO oxycodone 15mg Q4 PRN with IV Dilaudid 1mg Q4 PRN for severe pain vs discharge   - Cyclobenzaprine 10 mg every 8 hours prn   - Bowel regimen for opioid induced constipation with Senna 2tabs BID and Miralax daily, PO Benadryl 25 mg q6h PRN for opioid-induced pruritus, PO Zofran 8 mg q8h PRN for opioid-induced nausea  - continue 2L night time oxygen   - c/w home folic acid 1 mg daily  - Utox: + cannabinoid (3/4)   - IS encouraged      # CHARLES - improved   - Etiology likely pre-renal secondary to exchange 3/3, no new medications initiated this admission  - CT and MRI done 3/2 without contrast   - sCR 1.38 (3/4) --> 1.17 (3/5) --> 1.01 (3/6)   - s/p 500 mL bolus D51/2NS 3/4   - UA (3/4) negative     # L Breast Mass, s/p bx 1/31, low suspicion for malignancy  - Established with [facility], s/p bx, follow up in 6 months (scheduled for 8/2025)     # DVT, PE  # SVC thrombus  - On warfarin at home, most recent schedule was 5 mg nightly except on Tuesdays she will take 7.5 mg. She does not take warfarin on Saturday and Sundays.   - S/p low-intensity heparin gtt for line placement (3/3-planned dc 3/4 evening)  - Per [facility], ok to bridge to home warfarin on 3/4  - Per pharmacy 3/4, resumed warfarin at last dose (5 mg), continue heparin gtt until first lovenox 100 mg BID given at 2100 on 3/4, discontinue heparin gtt at 2200; monitor daily PT/INR for warfarin/lovenox adjustments until INR goal 2-3  - s/p ultrasound BLE for swelling (3/4), preliminary results show no acute DVT     # hx SVT  # pHTN  - c/w home metop 25 mg BID and Lasix 20 every other day   - telemetry   - s/p 20mg IVP lasix 3/6       #DISPO  - Full code - confirmed on admit  - DC home pending completed hb exchange and pain control and L eye intervention; most likely 3/6 vs 3/7   - SURROGATE DECISION  MAKER: Daughter Tati (mom) [phone]; pt also has a daughter.  - Sickle cell FUV 3/10 w/ Dr. Whaley, Cardiology 3/12       Assessment and plan as above discussed with attending physician Dr. Hensley    I spent 60 minutes in the professional and overall care of this patient.    Sonia Kerns, GRISEL-CNP

## 2025-03-06 NOTE — CARE PLAN
Problem: Pain - Adult  Goal: Verbalizes/displays adequate comfort level or baseline comfort level  Outcome: Progressing     Problem: Safety - Adult  Goal: Free from fall injury  Outcome: Progressing     Problem: Discharge Planning  Goal: Discharge to home or other facility with appropriate resources  Outcome: Progressing     Problem: Nutrition  Goal: Nutrient intake appropriate for maintaining nutritional needs  Outcome: Progressing   The patient's goals for the shift include      The clinical goals for the shift include Pain management    Pt is alert and oriented at bedside. Pt shows no s/s of distress placed on 2L NC at HS. Bed in lowest position and call light within reach. Will continue to monitor, assess, and update pt on plan of care.

## 2025-03-06 NOTE — SIGNIFICANT EVENT
Panretinal photocoagulation (PRP) attempted left eye yesterday for proliferative sickle cell retinopathy and new vitreous hemorrhage. Due to amount of vitreous hemorrhage, not able to apply adequate laser treatment. Discussed with attending physician Dr. John Biggs, who recommends re-attempt panretinal photocoagulation (PRP) left eye in the next 1-2 weeks to allow for vitreous hemorrhage to settle. I discussed this plan with the patient, and ophthalmology will call to arrange follow up outpatient for laser in 1-2 weeks.     Ophthalmology to sign off. Please contact us with further questions or concerns.      Pager: [phone]    Narendra Ly MD  Department of Ophthalmology, PGY-3

## 2025-03-06 NOTE — CARE PLAN
The patient's goals for the shift include      The clinical goals for the shift include patient will have controlled pain throughout this shift 3/6/25 1900      Problem: Pain - Adult  Goal: Verbalizes/displays adequate comfort level or baseline comfort level  Outcome: Progressing     Problem: Safety - Adult  Goal: Free from fall injury  Outcome: Progressing     Problem: Discharge Planning  Goal: Discharge to home or other facility with appropriate resources  Outcome: Progressing

## 2025-03-06 NOTE — SIGNIFICANT EVENT
Patient's telemetry batteries changed.  Telemetry monitor turned back on and functioning appropriately.    Patient's telemetry strip completed w/ oncoming/off-going RN.   Strip reflected in the flowsheets and hard copy placed in patient's chart.     Kelin Rivera RN

## 2025-03-07 LAB
ALBUMIN SERPL BCP-MCNC: 3.1 G/DL (ref 3.4–5)
ALP SERPL-CCNC: 113 U/L (ref 33–110)
ALT SERPL W P-5'-P-CCNC: 12 U/L (ref 7–45)
ANION GAP SERPL CALC-SCNC: 10 MMOL/L (ref 10–20)
AST SERPL W P-5'-P-CCNC: 25 U/L (ref 9–39)
BASOPHILS # BLD MANUAL: 0 X10*3/UL (ref 0–0.1)
BASOPHILS NFR BLD MANUAL: 0 %
BILIRUB SERPL-MCNC: 1.1 MG/DL (ref 0–1.2)
BUN SERPL-MCNC: 13 MG/DL (ref 6–23)
CALCIUM SERPL-MCNC: 8.5 MG/DL (ref 8.6–10.6)
CARBOXYTHC UR-MCNC: 35 NG/ML
CHLORIDE SERPL-SCNC: 100 MMOL/L (ref 98–107)
CO2 SERPL-SCNC: 32 MMOL/L (ref 21–32)
CREAT SERPL-MCNC: 1.1 MG/DL (ref 0.5–1.05)
EGFRCR SERPLBLD CKD-EPI 2021: 59 ML/MIN/1.73M*2
EOSINOPHIL # BLD MANUAL: 0.36 X10*3/UL (ref 0–0.7)
EOSINOPHIL NFR BLD MANUAL: 4.3 %
ERYTHROCYTE [DISTWIDTH] IN BLOOD BY AUTOMATED COUNT: 21.9 % (ref 11.5–14.5)
GLUCOSE SERPL-MCNC: 83 MG/DL (ref 74–99)
HCT VFR BLD AUTO: 26.6 % (ref 36–46)
HGB BLD-MCNC: 8.5 G/DL (ref 12–16)
HGB RETIC QN: 25 PG (ref 28–38)
HYPOCHROMIA BLD QL SMEAR: NORMAL
IMM GRANULOCYTES # BLD AUTO: 0.02 X10*3/UL (ref 0–0.7)
IMM GRANULOCYTES NFR BLD AUTO: 0.2 % (ref 0–0.9)
IMMATURE RETIC FRACTION: 22.6 %
INR PPP: 1.1 (ref 0.9–1.1)
LDH SERPL L TO P-CCNC: 246 U/L (ref 84–246)
LYMPHOCYTES # BLD MANUAL: 2.42 X10*3/UL (ref 1.2–4.8)
LYMPHOCYTES NFR BLD MANUAL: 29.1 %
MCH RBC QN AUTO: 28.2 PG (ref 26–34)
MCHC RBC AUTO-ENTMCNC: 32 G/DL (ref 32–36)
MCV RBC AUTO: 88 FL (ref 80–100)
MONOCYTES # BLD MANUAL: 0.71 X10*3/UL (ref 0.1–1)
MONOCYTES NFR BLD MANUAL: 8.5 %
NEUTS SEG # BLD MANUAL: 4.61 X10*3/UL (ref 1.2–7)
NEUTS SEG NFR BLD MANUAL: 55.5 %
NRBC BLD-RTO: 19.2 /100 WBCS (ref 0–0)
PLATELET # BLD AUTO: 204 X10*3/UL (ref 150–450)
POLYCHROMASIA BLD QL SMEAR: NORMAL
POTASSIUM SERPL-SCNC: 4.6 MMOL/L (ref 3.5–5.3)
PROT SERPL-MCNC: 6.2 G/DL (ref 6.4–8.2)
PROTHROMBIN TIME: 12.4 SECONDS (ref 9.8–12.4)
RBC # BLD AUTO: 3.01 X10*6/UL (ref 4–5.2)
RBC MORPH BLD: NORMAL
RETICS #: 0.3 X10*6/UL (ref 0.02–0.08)
RETICS/RBC NFR AUTO: 10.1 % (ref 0.5–2)
SODIUM SERPL-SCNC: 137 MMOL/L (ref 136–145)
TARGETS BLD QL SMEAR: NORMAL
TOTAL CELLS COUNTED BLD: 117
VARIANT LYMPHS # BLD MANUAL: 0.22 X10*3/UL (ref 0–0.5)
VARIANT LYMPHS NFR BLD: 2.6 %
WBC # BLD AUTO: 8.3 X10*3/UL (ref 4.4–11.3)

## 2025-03-07 PROCEDURE — 2500000004 HC RX 250 GENERAL PHARMACY W/ HCPCS (ALT 636 FOR OP/ED)

## 2025-03-07 PROCEDURE — 85007 BL SMEAR W/DIFF WBC COUNT: CPT

## 2025-03-07 PROCEDURE — 2500000001 HC RX 250 WO HCPCS SELF ADMINISTERED DRUGS (ALT 637 FOR MEDICARE OP)

## 2025-03-07 PROCEDURE — 83615 LACTATE (LD) (LDH) ENZYME: CPT

## 2025-03-07 PROCEDURE — 85027 COMPLETE CBC AUTOMATED: CPT

## 2025-03-07 PROCEDURE — 85610 PROTHROMBIN TIME: CPT

## 2025-03-07 PROCEDURE — 2500000004 HC RX 250 GENERAL PHARMACY W/ HCPCS (ALT 636 FOR OP/ED): Mod: JZ,TB

## 2025-03-07 PROCEDURE — 99233 SBSQ HOSP IP/OBS HIGH 50: CPT

## 2025-03-07 PROCEDURE — 2500000004 HC RX 250 GENERAL PHARMACY W/ HCPCS (ALT 636 FOR OP/ED): Mod: TB

## 2025-03-07 PROCEDURE — 80053 COMPREHEN METABOLIC PANEL: CPT

## 2025-03-07 PROCEDURE — 1170000001 HC PRIVATE ONCOLOGY ROOM DAILY

## 2025-03-07 PROCEDURE — 85045 AUTOMATED RETICULOCYTE COUNT: CPT

## 2025-03-07 PROCEDURE — 2500000002 HC RX 250 W HCPCS SELF ADMINISTERED DRUGS (ALT 637 FOR MEDICARE OP, ALT 636 FOR OP/ED)

## 2025-03-07 PROCEDURE — 36415 COLL VENOUS BLD VENIPUNCTURE: CPT

## 2025-03-07 RX ADMIN — ATROPINE SULFATE 1 DROP: 10 SOLUTION/ DROPS OPHTHALMIC at 21:28

## 2025-03-07 RX ADMIN — TRIAMCINOLONE ACETONIDE: 1 CREAM TOPICAL at 08:39

## 2025-03-07 RX ADMIN — HYDROMORPHONE HYDROCHLORIDE 0.5 MG: 1 INJECTION, SOLUTION INTRAMUSCULAR; INTRAVENOUS; SUBCUTANEOUS at 03:38

## 2025-03-07 RX ADMIN — HYDROMORPHONE HYDROCHLORIDE 1.5 MG: 2 INJECTION, SOLUTION INTRAMUSCULAR; INTRAVENOUS; SUBCUTANEOUS at 15:04

## 2025-03-07 RX ADMIN — WARFARIN SODIUM 5 MG: 5 TABLET ORAL at 18:05

## 2025-03-07 RX ADMIN — HYDROMORPHONE HYDROCHLORIDE 1.5 MG: 2 INJECTION, SOLUTION INTRAMUSCULAR; INTRAVENOUS; SUBCUTANEOUS at 18:05

## 2025-03-07 RX ADMIN — METOPROLOL TARTRATE 25 MG: 25 TABLET, FILM COATED ORAL at 21:27

## 2025-03-07 RX ADMIN — METOPROLOL TARTRATE 25 MG: 25 TABLET, FILM COATED ORAL at 08:39

## 2025-03-07 RX ADMIN — HYDROMORPHONE HYDROCHLORIDE 1.5 MG: 2 INJECTION, SOLUTION INTRAMUSCULAR; INTRAVENOUS; SUBCUTANEOUS at 21:25

## 2025-03-07 RX ADMIN — CETIRIZINE HYDROCHLORIDE 10 MG: 10 TABLET, FILM COATED ORAL at 08:39

## 2025-03-07 RX ADMIN — ENOXAPARIN SODIUM 100 MG: 100 INJECTION SUBCUTANEOUS at 08:42

## 2025-03-07 RX ADMIN — ENOXAPARIN SODIUM 100 MG: 100 INJECTION SUBCUTANEOUS at 21:27

## 2025-03-07 RX ADMIN — HYDROMORPHONE HYDROCHLORIDE 1.5 MG: 2 INJECTION, SOLUTION INTRAMUSCULAR; INTRAVENOUS; SUBCUTANEOUS at 05:38

## 2025-03-07 RX ADMIN — HYDROMORPHONE HYDROCHLORIDE 1.5 MG: 2 INJECTION, SOLUTION INTRAMUSCULAR; INTRAVENOUS; SUBCUTANEOUS at 08:38

## 2025-03-07 RX ADMIN — FOLIC ACID 1 MG: 1 TABLET ORAL at 08:39

## 2025-03-07 RX ADMIN — HYDROMORPHONE HYDROCHLORIDE 1.5 MG: 2 INJECTION, SOLUTION INTRAMUSCULAR; INTRAVENOUS; SUBCUTANEOUS at 02:24

## 2025-03-07 RX ADMIN — FUROSEMIDE 20 MG: 20 TABLET ORAL at 08:39

## 2025-03-07 RX ADMIN — HYDROMORPHONE HYDROCHLORIDE 1.5 MG: 2 INJECTION, SOLUTION INTRAMUSCULAR; INTRAVENOUS; SUBCUTANEOUS at 11:46

## 2025-03-07 ASSESSMENT — PAIN - FUNCTIONAL ASSESSMENT
PAIN_FUNCTIONAL_ASSESSMENT: 0-10

## 2025-03-07 ASSESSMENT — PAIN SCALES - GENERAL
PAINLEVEL_OUTOF10: 9
PAINLEVEL_OUTOF10: 9
PAINLEVEL_OUTOF10: 8
PAINLEVEL_OUTOF10: 5 - MODERATE PAIN
PAINLEVEL_OUTOF10: 9
PAINLEVEL_OUTOF10: 8
PAINLEVEL_OUTOF10: 10 - WORST POSSIBLE PAIN
PAINLEVEL_OUTOF10: 7
PAINLEVEL_OUTOF10: 9
PAINLEVEL_OUTOF10: 9
PAINLEVEL_OUTOF10: 8
PAINLEVEL_OUTOF10: 10 - WORST POSSIBLE PAIN

## 2025-03-07 ASSESSMENT — COGNITIVE AND FUNCTIONAL STATUS - GENERAL
DAILY ACTIVITIY SCORE: 24
MOBILITY SCORE: 24

## 2025-03-07 NOTE — CARE PLAN
The patient's goals for the shift include      The clinical goals for the shift include Pt will have decreased pain through end of shift 3/7/2025 1900      Problem: Pain - Adult  Goal: Verbalizes/displays adequate comfort level or baseline comfort level  Outcome: Progressing     Problem: Safety - Adult  Goal: Free from fall injury  Outcome: Progressing     Problem: Discharge Planning  Goal: Discharge to home or other facility with appropriate resources  Outcome: Progressing     Problem: Chronic Conditions and Co-morbidities  Goal: Patient's chronic conditions and co-morbidity symptoms are monitored and maintained or improved  Outcome: Progressing     Problem: Nutrition  Goal: Nutrient intake appropriate for maintaining nutritional needs  Outcome: Progressing     Problem: Pain  Goal: Takes deep breaths with improved pain control throughout the shift  Outcome: Progressing  Goal: Turns in bed with improved pain control throughout the shift  Outcome: Progressing  Goal: Walks with improved pain control throughout the shift  Outcome: Progressing  Goal: Performs ADL's with improved pain control throughout shift  Outcome: Progressing  Goal: Participates in PT with improved pain control throughout the shift  Outcome: Progressing  Goal: Free from opioid side effects throughout the shift  Outcome: Progressing  Goal: Free from acute confusion related to pain meds throughout the shift  Outcome: Progressing     Problem: Fall/Injury  Goal: Not fall by end of shift  Outcome: Progressing  Goal: Be free from injury by end of the shift  Outcome: Progressing  Goal: Verbalize understanding of personal risk factors for fall in the hospital  Outcome: Progressing  Goal: Verbalize understanding of risk factor reduction measures to prevent injury from fall in the home  Outcome: Progressing  Goal: Use assistive devices by end of the shift  Outcome: Progressing  Goal: Pace activities to prevent fatigue by end of the shift  Outcome:  Progressing

## 2025-03-07 NOTE — PROGRESS NOTES
Senait Narvaez is a 55 y.o. female on day 5 of admission presenting with Cardiomyopathy, ischemic.    Subjective   Seen at bedside this morning. States that pain was severe overnight in left leg, left shoulder and left knee. States right ankle pain is improved since yesterday. States lasix helped yesterday. She thinks the dilaudid is helping, does not feel ready to rotate today but is aware that the plan is to rotate tomorrow. She states that this is the type of pain she experiences after an exchange and does not feel any different. Last BM 3/3.     States left eye vision unchanged from admission, unable to see objects clearly, endorses floaters and red spots in vision, no eye pain. Ophthalmology already at bedside this AM and made her aware they will re-attempt panretinal photocoagulation (PRP) left eye in the next 1-2 weeks to allow for vitreous hemorrhage to settle. Denies chest pain, SOB, abdominal pain, n/v/c/d, headache, fever, chills.       Objective     Physical Exam  Constitutional:       Appearance: Normal appearance.   HENT:      Head: Normocephalic.      Nose: Nose normal.      Mouth/Throat:      Mouth: Mucous membranes are moist.   Eyes:      General: Lids are normal. Visual field deficit present.      Extraocular Movements: Extraocular movements intact.      Conjunctiva/sclera: Conjunctivae normal.   Cardiovascular:      Rate and Rhythm: Normal rate and regular rhythm.      Pulses: Normal pulses.      Heart sounds: Normal heart sounds.   Pulmonary:      Effort: Pulmonary effort is normal.      Breath sounds: Normal breath sounds.   Abdominal:      General: Abdomen is flat. Bowel sounds are normal.      Palpations: Abdomen is soft.   Musculoskeletal:         General: Normal range of motion.      Cervical back: Normal range of motion.      Comments: Mild trace edema in BLE    Skin:     General: Skin is warm.   Neurological:      General: No focal deficit present.      Mental Status: She is alert and  "oriented to person, place, and time.   Psychiatric:         Mood and Affect: Mood normal.         Behavior: Behavior normal.       Last Recorded Vitals  Blood pressure 96/57, pulse 72, temperature 36.4 °C (97.5 °F), temperature source Temporal, resp. rate 16, height 1.651 m (5' 5\"), weight 95.7 kg (210 lb 15.7 oz), SpO2 98%.  Intake/Output last 3 Shifts:  No intake/output data recorded.    Relevant Results  No results found.    Scheduled medications  atropine, 1 drop, Left Eye, Nightly  cetirizine, 10 mg, oral, Daily  enoxaparin, 100 mg, subcutaneous, BID  folic acid, 1 mg, oral, Daily  furosemide, 20 mg, oral, Every other day  lidocaine, 2 patch, transdermal, Daily  metoprolol tartrate, 25 mg, oral, BID  polyethylene glycol, 17 g, oral, Daily  sennosides, 2 tablet, oral, BID  triamcinolone, , Topical, BID  warfarin, 5 mg, oral, Daily      Continuous medications       PRN medications  PRN medications: albuterol, cyclobenzaprine, fluticasone, HYDROmorphone, ondansetron, oxygen, traZODone  Results for orders placed or performed during the hospital encounter of 03/02/25 (from the past 24 hours)   CBC and Auto Differential   Result Value Ref Range    WBC 7.6 4.4 - 11.3 x10*3/uL    nRBC 26.4 (H) 0.0 - 0.0 /100 WBCs    RBC 3.02 (L) 4.00 - 5.20 x10*6/uL    Hemoglobin 8.5 (L) 12.0 - 16.0 g/dL    Hematocrit 25.8 (L) 36.0 - 46.0 %    MCV 85 80 - 100 fL    MCH 28.1 26.0 - 34.0 pg    MCHC 32.9 32.0 - 36.0 g/dL    RDW 21.2 (H) 11.5 - 14.5 %    Platelets 188 150 - 450 x10*3/uL    Immature Granulocytes %, Automated 0.4 0.0 - 0.9 %    Immature Granulocytes Absolute, Automated 0.03 0.00 - 0.70 x10*3/uL   Comprehensive Metabolic Panel   Result Value Ref Range    Glucose 82 74 - 99 mg/dL    Sodium 139 136 - 145 mmol/L    Potassium 4.4 3.5 - 5.3 mmol/L    Chloride 102 98 - 107 mmol/L    Bicarbonate 33 (H) 21 - 32 mmol/L    Anion Gap 8 (L) 10 - 20 mmol/L    Urea Nitrogen 12 6 - 23 mg/dL    Creatinine 1.01 0.50 - 1.05 mg/dL    eGFR 65 " >60 mL/min/1.73m*2    Calcium 8.6 8.6 - 10.6 mg/dL    Albumin 3.1 (L) 3.4 - 5.0 g/dL    Alkaline Phosphatase 110 33 - 110 U/L    Total Protein 6.1 (L) 6.4 - 8.2 g/dL    AST 19 9 - 39 U/L    Bilirubin, Total 1.3 (H) 0.0 - 1.2 mg/dL    ALT 9 7 - 45 U/L   Lactate Dehydrogenase   Result Value Ref Range     84 - 246 U/L   Reticulocytes   Result Value Ref Range    Retic % 9.9 (H) 0.5 - 2.0 %    Retic Absolute 0.298 (H) 0.018 - 0.083 x10*6/uL    Reticulocyte Hemoglobin 26 (L) 28 - 38 pg    Immature Retic fraction 24.2 (H) <=16.0 %   Protime-INR   Result Value Ref Range    Protime 12.0 9.8 - 12.4 seconds    INR 1.1 0.9 - 1.1   Manual Differential   Result Value Ref Range    Neutrophils %, Manual 58.6 40.0 - 80.0 %    Lymphocytes %, Manual 24.2 13.0 - 44.0 %    Monocytes %, Manual 6.0 2.0 - 10.0 %    Eosinophils %, Manual 5.2 0.0 - 6.0 %    Basophils %, Manual 0.0 0.0 - 2.0 %    Atypical Lymphocytes %, Manual 6.0 0.0 - 2.0 %    Seg Neutrophils Absolute, Manual 4.45 1.20 - 7.00 x10*3/uL    Lymphocytes Absolute, Manual 1.84 1.20 - 4.80 x10*3/uL    Monocytes Absolute, Manual 0.46 0.10 - 1.00 x10*3/uL    Eosinophils Absolute, Manual 0.40 0.00 - 0.70 x10*3/uL    Basophils Absolute, Manual 0.00 0.00 - 0.10 x10*3/uL    Atypical Lymphs Absolute, Manual 0.46 0.00 - 0.50 x10*3/uL    Total Cells Counted 116     RBC Morphology See Below     Polychromasia Mild     Hypochromia Mild     Target Cells Many      *Note: Due to a large number of results and/or encounters for the requested time period, some results have not been displayed. A complete set of results can be found in Results Review.       This patient has a central line   Reason for the central line remaining today? 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 Patient also presents with painless L eye vision loss w/floaters and blurriness is most concerning for a retinal detachment and acute retinopathy in the setting of SCD. IR consulted and placed apheresis line 3/3, s/p routine exchange 3/3. Ophthalmology emergently consulted for evaluation concerned for vitreous hemorrhage possible retinol detachment from sickle retinopathy. Started on atropine eye drops. Ophthalmology recommend panretinal photocoagulation. Started on low-intensity heparin ggt; warfarin not restarted on admission while waiting for ophtho recs. Started bridge to home warfarin 3/4 with lovenox per pharmacy recs (ok'd by ophtho). CTH wo con 3/2 to rule out hemorrhagic stroke, no acute findings. MRI brain 3/3 to evaluate for acute ischemic stroke, no evidence of acute infarct, intracranial mass effect or midline shift. s/p panretinal photocoagulation on 3/5 was partially successful d/t amount of vitreous hemorrhage, not able to apply adequate laser treatment. Recommended re-attempt panretinal photocoagulation (PRP) left eye in the next 1-2 weeks to allow for vitreous hemorrhage to settle. Chronic sickle cell changes present but no acute findings. Continue atropine eye drops on discharge until seen at outpt clinic. Started weaning IV dilaudid on 3/6. Discharge home pending pain control and ophthalmology intervention.    Updates 3/7:  - Refused to rotate today d/t pain overnight; attempt to rotate 3/8 with PO oxycodone 10-15mg Q4 PRN with IV Dilaudid 1mg Q4 PRN for severe pain vs discharge   - Ophthalmology: s/p panretinal photocoagulation 3/5 was partially successful d/t amount of vitreous  hemorrhage, not able to apply adequate laser treatment. Recommended re-attempt panretinal photocoagulation (PRP) left eye in the next 1-2 weeks to allow for vitreous hemorrhage to settle. Ophtho to arrange FUV. No changes in vision on exam 3/7   - Per ophthalmology, 3/6, continue atropine drops at discharge until seen at outpt clinic   - started warfarin + lovenox per pharmacy evening of 3/4 with plan to dc lovenox once INR 2-3; daily PT/INR (INR 1.1 3/7); per pharmacy no changes to be made to warfarin/ Lovenox doses. INR should be reflexive of warfarin dosing within 3 days      # Painless vision loss in left eye, c/f acute retinal detachment, SCD-related retinopathy- stable   - STAT consulted ophthalmology likely vitreous hemorrhage possible retinol detachment from sickle retinopathy.   - STAT CTH (3/2) wo neg for acute findings   - MRI brain w/wo con (3/3) showing chronic changes with no acute findings   - Started on atropine eye drops 3/2-current   - Ophthalmology consulted   - s/p panretinal photocoagulation on 3/5 was partially successful d/t amount of vitreous hemorrhage, not able to apply adequate laser treatment. Recommended re-attempt panretinal photocoagulation (PRP) left eye in the next 1-2 weeks to allow for vitreous hemorrhage to settle.   - Ok from ophthalmology perspective to resume home warfarin, discontinue heparin gtt 3/4   - Ophtho to arrange FUV   - Per ophthalmology, 3/6, continue atropine drops at discharge until seen at outpt clinic     # Hb SC disease   #Acute on chronic SCD related pain crisis  - currently on q6 week RBC exchange transfusions (most recent 1/20/25); done 3/3/25  - [facility] reviewed, last updated 7/25/24  - [facility] reviewed, no aberrant behavior noted (last filled oxycodone 2/25 x12 days qty 75 tabs)  - Hemolysis labs are at baseline, pre-exchange labs obtained 2/28/25  - Hgb baseline ~9-10; 9.5 (3/4) --> 8.5 (3/5); CTM    - Hgb S: pre-exchange 36.1%; post-exchange 13.5% (3/4)    - s/p routine exchange 3/3; pulled apheresis line 3/5   - s/p dilaudid 1 mg q3h prn for severe pain; IV dilaudid 0.5 mg q2h for moderate pain  - s/p Increased dilaudid frequency to 1 mg IV q 2 hours for increased pain (3/4 -3/5)  - Started 1.5mg IVP dilaudid Q3hrs (3/6- )  - Refused to rotate 3/7 d/t pain overnight; attempt to rotate 3/8 with PO oxycodone 10-15mg Q4 PRN with IV Dilaudid 1mg Q4 PRN for severe pain vs discharge  - Cyclobenzaprine 10 mg every 8 hours prn   - Bowel regimen for opioid induced constipation with Senna 2tabs BID and Miralax daily, PO Benadryl 25 mg q6h PRN for opioid-induced pruritus, PO Zofran 8 mg q8h PRN for opioid-induced nausea  - continue 2L night time oxygen   - c/w home folic acid 1 mg daily  - Utox: + cannabinoid (3/4)   - IS encouraged      # CHARLES - improved   - Etiology likely pre-renal secondary to exchange 3/3, no new medications initiated this admission  - CT and MRI done 3/2 without contrast   - sCR 1.38 (3/4) --> 1.17 (3/5) --> 1.01 (3/6) --> 1.1 (gave dose of IV lasix on 3/6); CTM   - s/p 500 mL bolus D51/2NS 3/4   - UA (3/4) negative     # L Breast Mass, s/p bx 1/31, low suspicion for malignancy  - Established with [facility], s/p bx, follow up in 6 months (scheduled for 8/2025)     # DVT, PE  # SVC thrombus  - On warfarin at home, most recent schedule was 5 mg nightly except on Tuesdays she will take 7.5 mg. She does not take warfarin on Saturday and Sundays.   - S/p low-intensity heparin gtt for line placement (3/3-planned dc 3/4 evening)  - Per ophthelia, ok to bridge to home warfarin on 3/4  - Per pharmacy 3/4, resumed warfarin at last dose (5 mg), continue heparin gtt until first lovenox 100 mg BID given at 2100 on 3/4, discontinue heparin gtt at 2200  - monitor daily PT/INR for warfarin/lovenox adjustments until INR goal 2-3 (INR 1.1 3/7); per pharmacy no changes to be made to warfarin/ Lovenox doses. INR should be reflexive of warfarin dosing within 3 days. If  discharged before therapeutic can follow with established coumadin clinic and bridge with Lovenox at home per attending, Dr. Hensley   - s/p ultrasound BLE for swelling (3/4) negative for DVT     # hx SVT  # pHTN  - c/w home metop 25 mg BID and Lasix 20 every other day   - telemetry   - s/p 20mg IVP lasix 3/6       #DISPO  - Full code - confirmed on admit  - DC home pending completed hb exchange and pain control and L eye intervention; most likely 3/6 vs 3/7   - SURROGATE DECISION MAKER: Daughter Tati (mom) [phone]; pt also has a daughter.  - Sickle cell FUV 3/10 w/ Dr. Whaley, Cardiology 3/12       Assessment and plan as above discussed with attending physician Dr. Hensley    I spent 60 minutes in the professional and overall care of this patient.    Sonia Kerns, APRN-CNP

## 2025-03-08 LAB
ALBUMIN SERPL BCP-MCNC: 3.1 G/DL (ref 3.4–5)
ALP SERPL-CCNC: 118 U/L (ref 33–110)
ALT SERPL W P-5'-P-CCNC: 14 U/L (ref 7–45)
ANION GAP SERPL CALC-SCNC: 9 MMOL/L (ref 10–20)
APTT PPP: 36 SECONDS (ref 26–36)
AST SERPL W P-5'-P-CCNC: 27 U/L (ref 9–39)
BASOPHILS # BLD MANUAL: 0 X10*3/UL (ref 0–0.1)
BASOPHILS NFR BLD MANUAL: 0 %
BILIRUB SERPL-MCNC: 1.1 MG/DL (ref 0–1.2)
BUN SERPL-MCNC: 14 MG/DL (ref 6–23)
CALCIUM SERPL-MCNC: 8.7 MG/DL (ref 8.6–10.6)
CHLORIDE SERPL-SCNC: 100 MMOL/L (ref 98–107)
CO2 SERPL-SCNC: 33 MMOL/L (ref 21–32)
CREAT SERPL-MCNC: 1.02 MG/DL (ref 0.5–1.05)
EGFRCR SERPLBLD CKD-EPI 2021: 65 ML/MIN/1.73M*2
EOSINOPHIL # BLD MANUAL: 0.68 X10*3/UL (ref 0–0.7)
EOSINOPHIL NFR BLD MANUAL: 7.9 %
ERYTHROCYTE [DISTWIDTH] IN BLOOD BY AUTOMATED COUNT: 22 % (ref 11.5–14.5)
GLUCOSE SERPL-MCNC: 86 MG/DL (ref 74–99)
HCT VFR BLD AUTO: 27.1 % (ref 36–46)
HGB BLD-MCNC: 8.9 G/DL (ref 12–16)
HGB RETIC QN: 24 PG (ref 28–38)
HYPOCHROMIA BLD QL SMEAR: NORMAL
IMM GRANULOCYTES # BLD AUTO: 0.03 X10*3/UL (ref 0–0.7)
IMM GRANULOCYTES NFR BLD AUTO: 0.4 % (ref 0–0.9)
IMMATURE RETIC FRACTION: 16.7 %
INR PPP: 1.1 (ref 0.9–1.1)
LDH SERPL L TO P-CCNC: 220 U/L (ref 84–246)
LYMPHOCYTES # BLD MANUAL: 2.56 X10*3/UL (ref 1.2–4.8)
LYMPHOCYTES NFR BLD MANUAL: 29.8 %
MCH RBC QN AUTO: 28.7 PG (ref 26–34)
MCHC RBC AUTO-ENTMCNC: 32.8 G/DL (ref 32–36)
MCV RBC AUTO: 87 FL (ref 80–100)
MONOCYTES # BLD MANUAL: 0.22 X10*3/UL (ref 0.1–1)
MONOCYTES NFR BLD MANUAL: 2.6 %
NEUTS SEG # BLD MANUAL: 5.13 X10*3/UL (ref 1.2–7)
NEUTS SEG NFR BLD MANUAL: 59.7 %
NRBC BLD-RTO: 10.7 /100 WBCS (ref 0–0)
OVALOCYTES BLD QL SMEAR: NORMAL
PLATELET # BLD AUTO: 221 X10*3/UL (ref 150–450)
POTASSIUM SERPL-SCNC: 4.2 MMOL/L (ref 3.5–5.3)
PROT SERPL-MCNC: 6.2 G/DL (ref 6.4–8.2)
PROTHROMBIN TIME: 12.6 SECONDS (ref 9.8–12.4)
RBC # BLD AUTO: 3.1 X10*6/UL (ref 4–5.2)
RBC MORPH BLD: NORMAL
RETICS #: 0.32 X10*6/UL (ref 0.02–0.08)
RETICS/RBC NFR AUTO: 10.2 % (ref 0.5–2)
SODIUM SERPL-SCNC: 138 MMOL/L (ref 136–145)
TARGETS BLD QL SMEAR: NORMAL
TOTAL CELLS COUNTED BLD: 114
WBC # BLD AUTO: 8.6 X10*3/UL (ref 4.4–11.3)

## 2025-03-08 PROCEDURE — 2500000001 HC RX 250 WO HCPCS SELF ADMINISTERED DRUGS (ALT 637 FOR MEDICARE OP)

## 2025-03-08 PROCEDURE — 36415 COLL VENOUS BLD VENIPUNCTURE: CPT

## 2025-03-08 PROCEDURE — 85007 BL SMEAR W/DIFF WBC COUNT: CPT

## 2025-03-08 PROCEDURE — 83615 LACTATE (LD) (LDH) ENZYME: CPT

## 2025-03-08 PROCEDURE — 85730 THROMBOPLASTIN TIME PARTIAL: CPT

## 2025-03-08 PROCEDURE — 84075 ASSAY ALKALINE PHOSPHATASE: CPT

## 2025-03-08 PROCEDURE — 2500000004 HC RX 250 GENERAL PHARMACY W/ HCPCS (ALT 636 FOR OP/ED): Mod: TB

## 2025-03-08 PROCEDURE — 85027 COMPLETE CBC AUTOMATED: CPT

## 2025-03-08 PROCEDURE — 2500000002 HC RX 250 W HCPCS SELF ADMINISTERED DRUGS (ALT 637 FOR MEDICARE OP, ALT 636 FOR OP/ED)

## 2025-03-08 PROCEDURE — 99233 SBSQ HOSP IP/OBS HIGH 50: CPT

## 2025-03-08 PROCEDURE — 2500000004 HC RX 250 GENERAL PHARMACY W/ HCPCS (ALT 636 FOR OP/ED)

## 2025-03-08 PROCEDURE — 85610 PROTHROMBIN TIME: CPT

## 2025-03-08 PROCEDURE — RXMED WILLOW AMBULATORY MEDICATION CHARGE

## 2025-03-08 PROCEDURE — 85260 CLOT FACTOR X STUART-POWER: CPT

## 2025-03-08 PROCEDURE — 85045 AUTOMATED RETICULOCYTE COUNT: CPT

## 2025-03-08 PROCEDURE — 1170000001 HC PRIVATE ONCOLOGY ROOM DAILY

## 2025-03-08 RX ORDER — HYDROMORPHONE HYDROCHLORIDE 1 MG/ML
1 INJECTION, SOLUTION INTRAMUSCULAR; INTRAVENOUS; SUBCUTANEOUS EVERY 4 HOURS PRN
Status: DISCONTINUED | OUTPATIENT
Start: 2025-03-08 | End: 2025-03-09 | Stop reason: HOSPADM

## 2025-03-08 RX ORDER — ATROPINE SULFATE 10 MG/ML
1 SOLUTION/ DROPS OPHTHALMIC NIGHTLY
Qty: 15 ML | Refills: 0 | Status: SHIPPED | OUTPATIENT
Start: 2025-03-08

## 2025-03-08 RX ORDER — ENOXAPARIN SODIUM 100 MG/ML
100 INJECTION SUBCUTANEOUS 2 TIMES DAILY
Qty: 8 EACH | Refills: 0 | Status: SHIPPED | OUTPATIENT
Start: 2025-03-08

## 2025-03-08 RX ORDER — OXYCODONE HYDROCHLORIDE 10 MG/1
10 TABLET ORAL EVERY 4 HOURS PRN
Status: DISCONTINUED | OUTPATIENT
Start: 2025-03-08 | End: 2025-03-09 | Stop reason: HOSPADM

## 2025-03-08 RX ADMIN — OXYCODONE HYDROCHLORIDE 10 MG: 10 TABLET ORAL at 20:10

## 2025-03-08 RX ADMIN — OXYCODONE HYDROCHLORIDE 10 MG: 10 TABLET ORAL at 16:08

## 2025-03-08 RX ADMIN — WARFARIN SODIUM 5 MG: 5 TABLET ORAL at 18:05

## 2025-03-08 RX ADMIN — ENOXAPARIN SODIUM 100 MG: 100 INJECTION SUBCUTANEOUS at 09:40

## 2025-03-08 RX ADMIN — HYDROMORPHONE HYDROCHLORIDE 1.5 MG: 2 INJECTION, SOLUTION INTRAMUSCULAR; INTRAVENOUS; SUBCUTANEOUS at 09:40

## 2025-03-08 RX ADMIN — CETIRIZINE HYDROCHLORIDE 10 MG: 10 TABLET, FILM COATED ORAL at 09:40

## 2025-03-08 RX ADMIN — HYDROMORPHONE HYDROCHLORIDE 1 MG: 1 INJECTION, SOLUTION INTRAMUSCULAR; INTRAVENOUS; SUBCUTANEOUS at 13:59

## 2025-03-08 RX ADMIN — HYDROMORPHONE HYDROCHLORIDE 1 MG: 1 INJECTION, SOLUTION INTRAMUSCULAR; INTRAVENOUS; SUBCUTANEOUS at 18:05

## 2025-03-08 RX ADMIN — HYDROMORPHONE HYDROCHLORIDE 1.5 MG: 2 INJECTION, SOLUTION INTRAMUSCULAR; INTRAVENOUS; SUBCUTANEOUS at 03:27

## 2025-03-08 RX ADMIN — METOPROLOL TARTRATE 25 MG: 25 TABLET, FILM COATED ORAL at 09:40

## 2025-03-08 RX ADMIN — HYDROMORPHONE HYDROCHLORIDE 1.5 MG: 2 INJECTION, SOLUTION INTRAMUSCULAR; INTRAVENOUS; SUBCUTANEOUS at 06:30

## 2025-03-08 RX ADMIN — ENOXAPARIN SODIUM 100 MG: 100 INJECTION SUBCUTANEOUS at 20:10

## 2025-03-08 RX ADMIN — ATROPINE SULFATE 1 DROP: 10 SOLUTION/ DROPS OPHTHALMIC at 20:10

## 2025-03-08 RX ADMIN — OXYCODONE HYDROCHLORIDE 10 MG: 10 TABLET ORAL at 12:03

## 2025-03-08 RX ADMIN — FOLIC ACID 1 MG: 1 TABLET ORAL at 09:40

## 2025-03-08 RX ADMIN — HYDROMORPHONE HYDROCHLORIDE 1 MG: 1 INJECTION, SOLUTION INTRAMUSCULAR; INTRAVENOUS; SUBCUTANEOUS at 22:24

## 2025-03-08 RX ADMIN — HYDROMORPHONE HYDROCHLORIDE 1.5 MG: 2 INJECTION, SOLUTION INTRAMUSCULAR; INTRAVENOUS; SUBCUTANEOUS at 00:25

## 2025-03-08 RX ADMIN — METOPROLOL TARTRATE 25 MG: 25 TABLET, FILM COATED ORAL at 20:10

## 2025-03-08 ASSESSMENT — COGNITIVE AND FUNCTIONAL STATUS - GENERAL
MOBILITY SCORE: 24
DAILY ACTIVITIY SCORE: 24
DAILY ACTIVITIY SCORE: 24
MOBILITY SCORE: 24

## 2025-03-08 ASSESSMENT — PAIN SCALES - GENERAL
PAINLEVEL_OUTOF10: 8
PAINLEVEL_OUTOF10: 8
PAINLEVEL_OUTOF10: 6
PAINLEVEL_OUTOF10: 8
PAINLEVEL_OUTOF10: 4
PAINLEVEL_OUTOF10: 7
PAINLEVEL_OUTOF10: 8
PAINLEVEL_OUTOF10: 8
PAINLEVEL_OUTOF10: 7
PAINLEVEL_OUTOF10: 8
PAINLEVEL_OUTOF10: 8
PAINLEVEL_OUTOF10: 7
PAINLEVEL_OUTOF10: 7

## 2025-03-08 ASSESSMENT — PAIN DESCRIPTION - LOCATION: LOCATION: GENERALIZED

## 2025-03-08 NOTE — CARE PLAN
Problem: Pain - Adult  Goal: Verbalizes/displays adequate comfort level or baseline comfort level  Outcome: Progressing     Problem: Safety - Adult  Goal: Free from fall injury  Outcome: Progressing     Problem: Discharge Planning  Goal: Discharge to home or other facility with appropriate resources  Outcome: Progressing     Problem: Chronic Conditions and Co-morbidities  Goal: Patient's chronic conditions and co-morbidity symptoms are monitored and maintained or improved  Outcome: Progressing     Problem: Nutrition  Goal: Nutrient intake appropriate for maintaining nutritional needs  Outcome: Progressing     Problem: Pain  Goal: Takes deep breaths with improved pain control throughout the shift  Outcome: Progressing  Goal: Turns in bed with improved pain control throughout the shift  Outcome: Progressing  Goal: Walks with improved pain control throughout the shift  Outcome: Progressing  Goal: Performs ADL's with improved pain control throughout shift  Outcome: Progressing  Goal: Participates in PT with improved pain control throughout the shift  Outcome: Progressing  Goal: Free from opioid side effects throughout the shift  Outcome: Progressing  Goal: Free from acute confusion related to pain meds throughout the shift  Outcome: Progressing     Problem: Fall/Injury  Goal: Not fall by end of shift  Outcome: Progressing  Goal: Be free from injury by end of the shift  Outcome: Progressing  Goal: Verbalize understanding of personal risk factors for fall in the hospital  Outcome: Progressing  Goal: Verbalize understanding of risk factor reduction measures to prevent injury from fall in the home  Outcome: Progressing  Goal: Use assistive devices by end of the shift  Outcome: Progressing  Goal: Pace activities to prevent fatigue by end of the shift  Outcome: Progressing   The patient's goals for the shift include      The clinical goals for the shift include pt will report pain score of <7/10 throughout shift

## 2025-03-08 NOTE — PROGRESS NOTES
Senait Narvaez is a 55 y.o. female on day 6 of admission presenting with Cardiomyopathy, ischemic.    Subjective   Seen at bedside this morning up eating breakfast. Pain has improved since yesterday. Continues in left ankle. States ankle feels tight. Able to bear wt without any swelling. Otherwise left should and left knee pain improved. We discussed starting to rotate with home oxycodone today and she is agreeable. We also discussed discharge tomorrow pending improvement in pain and she is agreeable to plan.     She is aware that her INR is still not therapeutic. We discussed sending Lovenox home to help her bridge. She has done Lovenox injections before and feels comfortable with plan. Checks her INR at home that is sent to the coumadin clinic and has a FUV on 3/12.     States left eye vision unchanged from admission, unable to see objects clearly, endorses floaters and red spots in vision, no eye pain. Ophthalmology is to follow outpt in 1 week. She was told she would get a phone call on Monday to schedule follow up visit.     Otherwise, denies chest pain, SOB, abdominal pain, n/v/c/d, headache, fever, chills.       Objective     Physical Exam  Constitutional:       Appearance: Normal appearance.   HENT:      Head: Normocephalic.      Nose: Nose normal.      Mouth/Throat:      Mouth: Mucous membranes are moist.   Eyes:      General: Lids are normal. Visual field deficit present.      Extraocular Movements: Extraocular movements intact.      Conjunctiva/sclera: Conjunctivae normal.   Cardiovascular:      Rate and Rhythm: Normal rate and regular rhythm.      Pulses: Normal pulses.      Heart sounds: Normal heart sounds.   Pulmonary:      Effort: Pulmonary effort is normal.      Breath sounds: Normal breath sounds.   Abdominal:      General: Abdomen is flat. Bowel sounds are normal.      Palpations: Abdomen is soft.   Musculoskeletal:         General: Normal range of motion.      Cervical back: Normal range of motion.  "  Skin:     General: Skin is warm.   Neurological:      General: No focal deficit present.      Mental Status: She is alert and oriented to person, place, and time.   Psychiatric:         Mood and Affect: Mood normal.         Behavior: Behavior normal.       Last Recorded Vitals  Blood pressure 107/69, pulse 65, temperature 36.7 °C (98.1 °F), temperature source Temporal, resp. rate 18, height 1.651 m (5' 5\"), weight 95.7 kg (210 lb 15.7 oz), SpO2 93%.  Intake/Output last 3 Shifts:  No intake/output data recorded.    Relevant Results  No results found.    Scheduled medications  atropine, 1 drop, Left Eye, Nightly  cetirizine, 10 mg, oral, Daily  enoxaparin, 100 mg, subcutaneous, BID  folic acid, 1 mg, oral, Daily  furosemide, 20 mg, oral, Every other day  lidocaine, 2 patch, transdermal, Daily  metoprolol tartrate, 25 mg, oral, BID  polyethylene glycol, 17 g, oral, Daily  sennosides, 2 tablet, oral, BID  triamcinolone, , Topical, BID  warfarin, 5 mg, oral, Daily      Continuous medications       PRN medications  PRN medications: albuterol, cyclobenzaprine, fluticasone, HYDROmorphone, ondansetron, oxyCODONE, oxygen, traZODone  Results for orders placed or performed during the hospital encounter of 03/02/25 (from the past 24 hours)   CBC and Auto Differential   Result Value Ref Range    WBC 8.6 4.4 - 11.3 x10*3/uL    nRBC 10.7 (H) 0.0 - 0.0 /100 WBCs    RBC 3.10 (L) 4.00 - 5.20 x10*6/uL    Hemoglobin 8.9 (L) 12.0 - 16.0 g/dL    Hematocrit 27.1 (L) 36.0 - 46.0 %    MCV 87 80 - 100 fL    MCH 28.7 26.0 - 34.0 pg    MCHC 32.8 32.0 - 36.0 g/dL    RDW 22.0 (H) 11.5 - 14.5 %    Platelets 221 150 - 450 x10*3/uL    Immature Granulocytes %, Automated 0.4 0.0 - 0.9 %    Immature Granulocytes Absolute, Automated 0.03 0.00 - 0.70 x10*3/uL   Comprehensive Metabolic Panel   Result Value Ref Range    Glucose 86 74 - 99 mg/dL    Sodium 138 136 - 145 mmol/L    Potassium 4.2 3.5 - 5.3 mmol/L    Chloride 100 98 - 107 mmol/L    Bicarbonate " 33 (H) 21 - 32 mmol/L    Anion Gap 9 (L) 10 - 20 mmol/L    Urea Nitrogen 14 6 - 23 mg/dL    Creatinine 1.02 0.50 - 1.05 mg/dL    eGFR 65 >60 mL/min/1.73m*2    Calcium 8.7 8.6 - 10.6 mg/dL    Albumin 3.1 (L) 3.4 - 5.0 g/dL    Alkaline Phosphatase 118 (H) 33 - 110 U/L    Total Protein 6.2 (L) 6.4 - 8.2 g/dL    AST 27 9 - 39 U/L    Bilirubin, Total 1.1 0.0 - 1.2 mg/dL    ALT 14 7 - 45 U/L   Lactate Dehydrogenase   Result Value Ref Range     84 - 246 U/L   Reticulocytes   Result Value Ref Range    Retic % 10.2 (H) 0.5 - 2.0 %    Retic Absolute 0.315 (H) 0.018 - 0.083 x10*6/uL    Reticulocyte Hemoglobin 24 (L) 28 - 38 pg    Immature Retic fraction 16.7 (H) <=16.0 %   Protime-INR   Result Value Ref Range    Protime 12.6 (H) 9.8 - 12.4 seconds    INR 1.1 0.9 - 1.1   Manual Differential   Result Value Ref Range    Neutrophils %, Manual 59.7 40.0 - 80.0 %    Lymphocytes %, Manual 29.8 13.0 - 44.0 %    Monocytes %, Manual 2.6 2.0 - 10.0 %    Eosinophils %, Manual 7.9 0.0 - 6.0 %    Basophils %, Manual 0.0 0.0 - 2.0 %    Seg Neutrophils Absolute, Manual 5.13 1.20 - 7.00 x10*3/uL    Lymphocytes Absolute, Manual 2.56 1.20 - 4.80 x10*3/uL    Monocytes Absolute, Manual 0.22 0.10 - 1.00 x10*3/uL    Eosinophils Absolute, Manual 0.68 0.00 - 0.70 x10*3/uL    Basophils Absolute, Manual 0.00 0.00 - 0.10 x10*3/uL    Total Cells Counted 114     RBC Morphology See Below     Hypochromia Mild     Target Cells Many     Ovalocytes Few      *Note: Due to a large number of results and/or encounters for the requested time period, some results have not been displayed. A complete set of results can be found in Results Review.       This patient has a central line   Reason for the central line remaining today? 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 Patient also presents with painless L eye vision loss w/floaters and blurriness is most concerning for a retinal detachment and acute retinopathy in the setting of SCD. IR consulted and placed apheresis line 3/3, s/p routine exchange 3/3. Ophthalmology emergently consulted for evaluation concerned for vitreous hemorrhage possible retinol detachment from sickle retinopathy. Started on atropine eye drops. Ophthalmology recommend panretinal photocoagulation. Started on low-intensity heparin ggt; warfarin not restarted on admission while waiting for ophtho recs. Started bridge to home warfarin 3/4 with lovenox per pharmacy recs (ok'd by ophtho). CTH wo con 3/2 to rule out hemorrhagic stroke, no acute findings. MRI brain 3/3 to evaluate for acute ischemic stroke, no evidence of acute infarct, intracranial mass effect or midline shift. s/p panretinal photocoagulation on 3/5 was partially successful d/t amount of vitreous hemorrhage, not able to apply adequate laser treatment. Recommended re-attempt panretinal photocoagulation (PRP) left eye in the next 1-2 weeks to allow for vitreous hemorrhage to settle. Chronic sickle cell changes present but no acute findings. Continue atropine eye drops on discharge until seen at outpt clinic. Started weaning IV dilaudid on 3/6 and rotated with home pain medications on 3/8. Discharge home pending pain control and ophthalmology intervention.    Updates 3/8:  - Started rotating 3/8 with PO home oxycodone 10mg Q4 PRN with IV Dilaudid 1mg Q4 PRN for severe pain; plan dc on 3/9   - Ophthalmology: s/p panretinal photocoagulation 3/5 was partially  successful d/t amount of vitreous hemorrhage, not able to apply adequate laser treatment. Recommended re-attempt panretinal photocoagulation (PRP) left eye in the next 1-2 weeks to allow for vitreous hemorrhage to settle. Ophtho to arrange FUV; to call patient on Monday to schedule FU visit. No changes in vision on exam 3/8  - Per ophthalmology, 3/6, continue atropine drops at discharge until seen at outpt clinic   - started warfarin + lovenox per pharmacy evening of 3/4 with plan to dc lovenox once INR 2-3; daily PT/INR (INR 1.1 3/8); per pharmacy no changes to be made to warfarin/ Lovenox doses. INR should be reflexive of warfarin dosing within 3-5 days   - For discharge, discussed sending Lovenox to [facility] bridge. She has done Lovenox injections before and feels comfortable with plan. Checks her INR at home that is sent to the coumadin clinic and has a FUV on 3/12.      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   - STAT CTH (3/2) wo neg for acute findings   - MRI brain w/wo con (3/3) showing chronic changes with no acute findings   - Started on atropine eye drops 3/2-current   - Ophthalmology consulted   - s/p panretinal photocoagulation on 3/5 was partially successful d/t amount of vitreous hemorrhage, not able to apply adequate laser treatment. Recommended re-attempt panretinal photocoagulation (PRP) left eye in the next 1-2 weeks to allow for vitreous hemorrhage to settle.   - Ok from ophthalmology perspective to resume home warfarin, discontinue heparin gtt 3/4   - Ophtho to arrange FUV; office will call patient on Monday to schedule FUV   - Per ophthalmology, 3/6, continue atropine drops at discharge until seen at outpt clinic     # Hb SC disease   # Acute on chronic SCD related pain crisis  - currently on q6 week RBC exchange transfusions (most  recent 1/20/25); done 3/3/25  - [facility] reviewed, last updated 7/25/24  - [facility] reviewed, no aberrant behavior noted (last filled oxycodone 2/25 x12 days qty 75 tabs)  - Hemolysis labs are at baseline, pre-exchange labs obtained 2/28/25  - Hgb baseline ~9-10; 9.5 (3/4) --> 8.5 (3/5); CTM    - Hgb S: pre-exchange 36.1%; post-exchange 13.5% (3/4)   - s/p routine exchange 3/3; pulled apheresis line 3/5   - s/p dilaudid 1 mg q3h prn for severe pain; IV dilaudid 0.5 mg q2h for moderate pain  - s/p Increased dilaudid frequency to 1 mg IV q 2 hours for increased pain (3/4 -3/5)  - s/p 1.5mg IVP dilaudid Q3hrs (3/6-8)  - 3/8, Started rotating 3/8 with PO home oxycodone 10mg Q4 PRN with IV Dilaudid 1mg Q4 PRN for severe pain; plan dc on 3/9   - Cyclobenzaprine 10 mg every 8 hours prn   - Bowel regimen for opioid induced constipation with Senna 2tabs BID and Miralax daily, PO Benadryl 25 mg q6h PRN for opioid-induced pruritus, PO Zofran 8 mg q8h PRN for opioid-induced nausea  - continue 2L night time oxygen   - c/w home folic acid 1 mg daily  - Utox: + cannabinoid (3/4)   - IS encouraged      # CHARLES - improved   - Etiology likely pre-renal secondary to exchange 3/3, no new medications initiated this admission  - CT and MRI done 3/2 without contrast   - sCR 1.38 (3/4) --> 1.17 (3/5) --> 1.01 (3/6) --> 1.1 (3/7, gave dose of IV lasix on 3/6) --> 1.02 (3/8)   - s/p 500 mL bolus D51/2NS 3/4   - UA (3/4) negative     # L Breast Mass, s/p bx 1/31, low suspicion for malignancy  - [facility]ished with [facility], s/p bx, follow up in 6 months (scheduled for 8/2025)     # DVT, PE  # SVC thrombus  - On warfarin at home, most recent schedule was 5 mg nightly except on Tuesdays she will take 7.5 mg. She does not take warfarin on Saturday and Sundays.   - S/p low-intensity heparin gtt for line placement (3/3-planned dc 3/4 evening)  - Per lady rodríguez to bridge to home warfarin on 3/4  - Per pharmacy 3/4, resumed warfarin at last dose (5  mg), continue heparin gtt until first lovenox 100 mg BID given at 2100 on 3/4, discontinue heparin gtt at 2200  - monitor daily PT/INR for warfarin/lovenox adjustments until INR goal 2-3 (INR 1.1 3/7); per pharmacy no changes to be made to warfarin/ Lovenox doses. INR should be reflexive of warfarin dosing within 3 days. If discharged before therapeutic can follow with established coumadin clinic and bridge with Lovenox at home per attending, Dr. Hensley   - s/p ultrasound BLE for swelling (3/4) negative for DVT  - For discharge, discussed sending Lovenox to [facility] bridge. She has done Lovenox injections before and feels comfortable with plan. Checks her INR at home that is sent to the coumadin clinic and has a FUV on 3/12      # hx SVT  # pHTN  - c/w home metop 25 mg BID and Lasix 20 every other day   - telemetry   - s/p 20mg IVP lasix 3/6       # DISPO  - Full code - confirmed on admit  - DC home most likely 3/9   - SURROGATE DECISION MAKER: Daughter Tati (mom) [phone]; pt also has a daughter.  - Sickle cell FUV 3/10 w/ Dr. Whaley, Cardiology 3/12       Assessment and plan as above discussed with attending physician Dr. Hensley    I spent 60 minutes in the professional and overall care of this patient.    Sonia Kerns, APRN-CNP

## 2025-03-08 NOTE — CARE PLAN
The patient's goals for the shift include      The clinical goals for the shift include pt will remain HDS and VSS throughout shift end on 3/8/25 @1900      Problem: Pain - Adult  Goal: Verbalizes/displays adequate comfort level or baseline comfort level  Outcome: Progressing     Problem: Safety - Adult  Goal: Free from fall injury  Outcome: Progressing     Problem: Discharge Planning  Goal: Discharge to home or other facility with appropriate resources  Outcome: Progressing     Problem: Chronic Conditions and Co-morbidities  Goal: Patient's chronic conditions and co-morbidity symptoms are monitored and maintained or improved  Outcome: Progressing     Problem: Nutrition  Goal: Nutrient intake appropriate for maintaining nutritional needs  Outcome: Progressing     Problem: Pain  Goal: Takes deep breaths with improved pain control throughout the shift  Outcome: Progressing  Goal: Turns in bed with improved pain control throughout the shift  Outcome: Progressing  Goal: Walks with improved pain control throughout the shift  Outcome: Progressing  Goal: Performs ADL's with improved pain control throughout shift  Outcome: Progressing  Goal: Participates in PT with improved pain control throughout the shift  Outcome: Progressing  Goal: Free from opioid side effects throughout the shift  Outcome: Progressing  Goal: Free from acute confusion related to pain meds throughout the shift  Outcome: Progressing     Problem: Fall/Injury  Goal: Not fall by end of shift  Outcome: Progressing  Goal: Be free from injury by end of the shift  Outcome: Progressing  Goal: Verbalize understanding of personal risk factors for fall in the hospital  Outcome: Progressing  Goal: Verbalize understanding of risk factor reduction measures to prevent injury from fall in the home  Outcome: Progressing  Goal: Use assistive devices by end of the shift  Outcome: Progressing  Goal: Pace activities to prevent fatigue by end of the shift  Outcome:  Progressing

## 2025-03-09 ENCOUNTER — PHARMACY VISIT (OUTPATIENT)
Dept: PHARMACY | Facility: CLINIC | Age: 55
End: 2025-03-09
Payer: COMMERCIAL

## 2025-03-09 VITALS
TEMPERATURE: 97.7 F | OXYGEN SATURATION: 93 % | HEART RATE: 63 BPM | RESPIRATION RATE: 18 BRPM | HEIGHT: 65 IN | WEIGHT: 210.98 LBS | BODY MASS INDEX: 35.15 KG/M2 | DIASTOLIC BLOOD PRESSURE: 69 MMHG | SYSTOLIC BLOOD PRESSURE: 108 MMHG

## 2025-03-09 PROBLEM — D57.00 SICKLE CELL ANEMIA WITH PAIN (MULTI): Status: RESOLVED | Noted: 2025-01-09 | Resolved: 2025-03-09

## 2025-03-09 LAB
ALBUMIN SERPL BCP-MCNC: 3.1 G/DL (ref 3.4–5)
ALP SERPL-CCNC: 123 U/L (ref 33–110)
ALT SERPL W P-5'-P-CCNC: 19 U/L (ref 7–45)
ANION GAP SERPL CALC-SCNC: 8 MMOL/L (ref 10–20)
AST SERPL W P-5'-P-CCNC: 34 U/L (ref 9–39)
BASOPHILS # BLD MANUAL: 0.07 X10*3/UL (ref 0–0.1)
BASOPHILS NFR BLD MANUAL: 0.9 %
BILIRUB SERPL-MCNC: 1.3 MG/DL (ref 0–1.2)
BUN SERPL-MCNC: 13 MG/DL (ref 6–23)
CALCIUM SERPL-MCNC: 8.8 MG/DL (ref 8.6–10.6)
CHLORIDE SERPL-SCNC: 99 MMOL/L (ref 98–107)
CO2 SERPL-SCNC: 35 MMOL/L (ref 21–32)
CREAT SERPL-MCNC: 0.95 MG/DL (ref 0.5–1.05)
EGFRCR SERPLBLD CKD-EPI 2021: 70 ML/MIN/1.73M*2
EOSINOPHIL # BLD MANUAL: 0.42 X10*3/UL (ref 0–0.7)
EOSINOPHIL NFR BLD MANUAL: 5.2 %
ERYTHROCYTE [DISTWIDTH] IN BLOOD BY AUTOMATED COUNT: 22.1 % (ref 11.5–14.5)
GLUCOSE SERPL-MCNC: 85 MG/DL (ref 74–99)
HCT VFR BLD AUTO: 27.8 % (ref 36–46)
HGB BLD-MCNC: 8.9 G/DL (ref 12–16)
HGB RETIC QN: 24 PG (ref 28–38)
HYPOCHROMIA BLD QL SMEAR: NORMAL
IMM GRANULOCYTES # BLD AUTO: 0.03 X10*3/UL (ref 0–0.7)
IMM GRANULOCYTES NFR BLD AUTO: 0.4 % (ref 0–0.9)
IMMATURE RETIC FRACTION: 14.7 %
INR PPP: 1.3 (ref 0.9–1.1)
LDH SERPL L TO P-CCNC: 225 U/L (ref 84–246)
LYMPHOCYTES # BLD MANUAL: 1.62 X10*3/UL (ref 1.2–4.8)
LYMPHOCYTES NFR BLD MANUAL: 20 %
MCH RBC QN AUTO: 28 PG (ref 26–34)
MCHC RBC AUTO-ENTMCNC: 32 G/DL (ref 32–36)
MCV RBC AUTO: 87 FL (ref 80–100)
MONOCYTES # BLD MANUAL: 0.7 X10*3/UL (ref 0.1–1)
MONOCYTES NFR BLD MANUAL: 8.7 %
NEUTS SEG # BLD MANUAL: 4.93 X10*3/UL (ref 1.2–7)
NEUTS SEG NFR BLD MANUAL: 60.9 %
NRBC BLD-RTO: 8.3 /100 WBCS (ref 0–0)
PLATELET # BLD AUTO: 249 X10*3/UL (ref 150–450)
POLYCHROMASIA BLD QL SMEAR: NORMAL
POTASSIUM SERPL-SCNC: 4 MMOL/L (ref 3.5–5.3)
PROT SERPL-MCNC: 6.2 G/DL (ref 6.4–8.2)
PROTHROMBIN TIME: 14.4 SECONDS (ref 9.8–12.4)
RBC # BLD AUTO: 3.18 X10*6/UL (ref 4–5.2)
RBC MORPH BLD: NORMAL
RETICS #: 0.33 X10*6/UL (ref 0.02–0.08)
RETICS/RBC NFR AUTO: 10.4 % (ref 0.5–2)
SODIUM SERPL-SCNC: 138 MMOL/L (ref 136–145)
TARGETS BLD QL SMEAR: NORMAL
TOTAL CELLS COUNTED BLD: 115
VARIANT LYMPHS # BLD MANUAL: 0.35 X10*3/UL (ref 0–0.5)
VARIANT LYMPHS NFR BLD: 4.3 %
WBC # BLD AUTO: 8.1 X10*3/UL (ref 4.4–11.3)

## 2025-03-09 PROCEDURE — 85045 AUTOMATED RETICULOCYTE COUNT: CPT

## 2025-03-09 PROCEDURE — 83615 LACTATE (LD) (LDH) ENZYME: CPT

## 2025-03-09 PROCEDURE — 85027 COMPLETE CBC AUTOMATED: CPT

## 2025-03-09 PROCEDURE — 85007 BL SMEAR W/DIFF WBC COUNT: CPT

## 2025-03-09 PROCEDURE — 80053 COMPREHEN METABOLIC PANEL: CPT

## 2025-03-09 PROCEDURE — 99239 HOSP IP/OBS DSCHRG MGMT >30: CPT | Performed by: NURSE PRACTITIONER

## 2025-03-09 PROCEDURE — 85610 PROTHROMBIN TIME: CPT

## 2025-03-09 PROCEDURE — 2500000001 HC RX 250 WO HCPCS SELF ADMINISTERED DRUGS (ALT 637 FOR MEDICARE OP)

## 2025-03-09 PROCEDURE — 2500000004 HC RX 250 GENERAL PHARMACY W/ HCPCS (ALT 636 FOR OP/ED): Mod: JZ,TB

## 2025-03-09 PROCEDURE — 36415 COLL VENOUS BLD VENIPUNCTURE: CPT

## 2025-03-09 RX ADMIN — OXYCODONE HYDROCHLORIDE 10 MG: 10 TABLET ORAL at 06:32

## 2025-03-09 RX ADMIN — METOPROLOL TARTRATE 25 MG: 25 TABLET, FILM COATED ORAL at 08:37

## 2025-03-09 RX ADMIN — HYDROMORPHONE HYDROCHLORIDE 1 MG: 1 INJECTION, SOLUTION INTRAMUSCULAR; INTRAVENOUS; SUBCUTANEOUS at 03:29

## 2025-03-09 RX ADMIN — OXYCODONE HYDROCHLORIDE 10 MG: 10 TABLET ORAL at 00:10

## 2025-03-09 RX ADMIN — FOLIC ACID 1 MG: 1 TABLET ORAL at 08:36

## 2025-03-09 RX ADMIN — HYDROMORPHONE HYDROCHLORIDE 1 MG: 1 INJECTION, SOLUTION INTRAMUSCULAR; INTRAVENOUS; SUBCUTANEOUS at 08:38

## 2025-03-09 ASSESSMENT — COGNITIVE AND FUNCTIONAL STATUS - GENERAL
MOBILITY SCORE: 24
DAILY ACTIVITIY SCORE: 24

## 2025-03-09 ASSESSMENT — PAIN - FUNCTIONAL ASSESSMENT
PAIN_FUNCTIONAL_ASSESSMENT: 0-10

## 2025-03-09 ASSESSMENT — PAIN SCALES - GENERAL
PAINLEVEL_OUTOF10: 7
PAINLEVEL_OUTOF10: 7
PAINLEVEL_OUTOF10: 8
PAINLEVEL_OUTOF10: 7
PAINLEVEL_OUTOF10: 7
PAINLEVEL_OUTOF10: 8

## 2025-03-09 NOTE — DISCHARGE SUMMARY
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 Patient also presents with painless L eye vision loss w/floaters and blurriness is most concerning for a retinal detachment and acute retinopathy in the setting of SCD. IR consulted and placed apheresis line 3/3, s/p routine exchange 3/3. Ophthalmology emergently consulted for evaluation concerned for vitreous hemorrhage possible retinol detachment from sickle retinopathy. Started on atropine eye drops. Ophthalmology recommend panretinal photocoagulation. Started on low-intensity heparin ggt; warfarin not restarted on admission while waiting for ophtho recs. Started bridge to home warfarin 3/4 with lovenox per pharmacy recs (ok'd by ophtho). CTH wo con 3/2 to rule out hemorrhagic stroke, no acute findings. MRI brain 3/3 to evaluate for acute ischemic stroke, no evidence of acute infarct, intracranial mass effect or midline shift. s/p panretinal photocoagulation on 3/5 was partially successful d/t amount of vitreous hemorrhage, not able to apply adequate laser treatment. Recommended  re-attempt panretinal photocoagulation (PRP) left eye in the next 1-2 weeks to allow for vitreous hemorrhage to settle. Chronic sickle cell changes present but no acute findings. Continue atropine eye drops on discharge until seen at outpt clinic. Started weaning IV dilaudid on 3/6 and rotated with home pain medications on 3/8. On 3/9 she requested dc. She was dc in stable condition with Rx for Lovenox for bridging, and atropine eye drops sent to [facility] and delivered to bedside prior to dc. Bridging instructions discussed in detail with pt and all questions answered on day of discharge. Pt to check daily INR at home.    Sickle Cell FUV 3/10, Coag clinic 3/12, Breast imaging 7/30, Surg Onc FUV 8/1, Cardiology FUV 8/25    Per ophthalmology, optho team to call pt to arrange follow up for outpt laser in 1-2 weeks    Pertinent Physical Exam At Time of Discharge  On the day of discharge, the patient reported feeling well and pain was controlled. Vitals and labs were stable.   On exam:  Constitutional: Awake, NAD,  ENMT: mucous membranes moist, no apparent injury, no lesions seen  Head/Neck: NCAT  Respiratory/Thorax: CTAB, no wheezing  Cardiovascular: RRR, S1+S2, no murmur  Gastrointestinal: Soft, NT, nondistended, +BS  Extremities: No LE edema  Psychological: Appropriate mood and behavior  Skin: No rash noted      Home Medications     Medication List      START taking these medications     atropine 1 % ophthalmic solution; Administer 1 drop into the left eye   once daily at bedtime. Please continue to administer to Left eye until   seen at outpt clinic with ophthalmology this upcoming week   enoxaparin 100 mg/mL syringe; Commonly known as: Lovenox; Inject 1 mL   (100 mg) under the skin 2 times a day. Continue to check INR daily at   home. Once INR is 2-3 STOP lovenox and continue warfarin daily     CONTINUE taking these medications     albuterol 90 mcg/actuation inhaler; Inhale 2 puffs every 6 hours if   needed  for wheezing.   ascorbic acid 500 mg chewable tablet; Commonly known as: Vitamin C   cyclobenzaprine 10 mg tablet; Commonly known as: Flexeril; Take 1 tablet   (10 mg) by mouth 3 times a day as needed for muscle spasms.   fluticasone 50 mcg/actuation nasal spray; Commonly known as: Flonase   folic acid 1 mg tablet; Commonly known as: Folvite   furosemide 20 mg tablet; Commonly known as: Lasix; Take 1 tablet (20 mg)   by mouth every other day.   loratadine 10 mg tablet; Commonly known as: Claritin   metoprolol tartrate 25 mg tablet; Commonly known as: Lopressor; Take 1   tablet (25 mg) by mouth 2 times a day.   multivitamin tablet   naloxone 4 mg/0.1 mL nasal spray; Commonly known as: Narcan; Administer   1 spray (4 mg) into affected nostril(s) if needed for opioid reversal. May   repeat every 2-3 minutes if needed, alternating nostrils, until medical   assistance becomes available.   ondansetron 4 mg tablet; Commonly known as: Zofran   oxyCODONE 10 mg immediate release tablet; Commonly known as: Roxicodone;   Take 1 tablet (10 mg) by mouth every 4 hours if needed for severe pain (7   - 10) (pain).   oxygen gas therapy; Commonly known as: O2; Inhale 1 each continuously.   traZODone 50 mg tablet; Commonly known as: Desyrel; Take 1 tablet (50   mg) by mouth as needed at bedtime for sleep.   triamcinolone 0.1 % cream; Commonly known as: Kenalog; Apply topically 2   times a day. Apply triamcinolone 0.1% cream to both the black plaques as   well as the red areas across the trunk and thighs   warfarin 5 mg tablet; Commonly known as: Coumadin; Take as directed. If   you are unsure how to take this medication, talk to your nurse or doctor.;   Original instructions: Take 1 tablet (5 mg) by mouth once daily in the   evening.   white petrolatum 41 % ointment ointment; Commonly known as: Aquaphor;   Apply 2 Applications topically 2 times a day. Apply vaseline to all   eroded/denuded areas. Mepilex transfer sheets may be used for  areas of   friction       Outpatient Follow-Up  Future Appointments   Date Time Provider Department Center   3/10/2025 10:30 AM Erich Whaley MD [id number] [facility]   3/12/2025 11:00 [place] [facility] [id number]   7/30/2025 10:30 [place] [facility]   7/30/2025 11:00 [place] [facility]   8/1/2025 10:00 AM GRISEL Ely-CNP [facility]   8/25/2025 11:20 AM Ankur White DO [address]     >30 minutes was spent on the assessment/discharge plan of this patient    Assessment and plan as above discussed with attending physician Dr. Shala Hunt APRN-CNP

## 2025-03-09 NOTE — CARE PLAN
The patient's goals for the shift include      The clinical goals for the shift include Patient will report decrease pain level > 8 by the end of the shift 3/9/25 @ 0700      Problem: Pain - Adult  Goal: Verbalizes/displays adequate comfort level or baseline comfort level  Outcome: Progressing     Problem: Safety - Adult  Goal: Free from fall injury  Outcome: Progressing     Problem: Discharge Planning  Goal: Discharge to home or other facility with appropriate resources  Outcome: Progressing     Problem: Chronic Conditions and Co-morbidities  Goal: Patient's chronic conditions and co-morbidity symptoms are monitored and maintained or improved  Outcome: Progressing     Problem: Nutrition  Goal: Nutrient intake appropriate for maintaining nutritional needs  Outcome: Progressing     Problem: Pain  Goal: Takes deep breaths with improved pain control throughout the shift  Outcome: Progressing  Goal: Turns in bed with improved pain control throughout the shift  Outcome: Progressing  Goal: Walks with improved pain control throughout the shift  Outcome: Progressing  Goal: Performs ADL's with improved pain control throughout shift  Outcome: Progressing  Goal: Participates in PT with improved pain control throughout the shift  Outcome: Progressing  Goal: Free from opioid side effects throughout the shift  Outcome: Progressing  Goal: Free from acute confusion related to pain meds throughout the shift  Outcome: Progressing     Problem: Fall/Injury  Goal: Not fall by end of shift  Outcome: Progressing  Goal: Be free from injury by end of the shift  Outcome: Progressing  Goal: Verbalize understanding of personal risk factors for fall in the hospital  Outcome: Progressing  Goal: Verbalize understanding of risk factor reduction measures to prevent injury from fall in the home  Outcome: Progressing  Goal: Use assistive devices by end of the shift  Outcome: Progressing  Goal: Pace activities to prevent fatigue by end of the  shift  Outcome: Progressing

## 2025-03-10 ENCOUNTER — OFFICE VISIT (OUTPATIENT)
Dept: HEMATOLOGY/ONCOLOGY | Facility: HOSPITAL | Age: 55
End: 2025-03-10
Payer: COMMERCIAL

## 2025-03-10 VITALS
WEIGHT: 214.1 LBS | HEART RATE: 99 BPM | TEMPERATURE: 97.5 F | BODY MASS INDEX: 35.63 KG/M2 | DIASTOLIC BLOOD PRESSURE: 50 MMHG | OXYGEN SATURATION: 90 % | RESPIRATION RATE: 17 BRPM | SYSTOLIC BLOOD PRESSURE: 110 MMHG

## 2025-03-10 DIAGNOSIS — D57.00 SICKLE CELL ANEMIA WITH PAIN: ICD-10-CM

## 2025-03-10 DIAGNOSIS — D57.00 SICKLE CELL CRISIS (MULTI): ICD-10-CM

## 2025-03-10 DIAGNOSIS — I82.210 THROMBOSIS OF SUPERIOR VENA CAVA (MULTI): ICD-10-CM

## 2025-03-10 LAB
1OH-MIDAZOLAM UR CFM-MCNC: 866 NG/ML
6MAM UR CFM-MCNC: <25 NG/ML
7AMINOCLONAZEPAM UR CFM-MCNC: <25 NG/ML
A-OH ALPRAZ UR CFM-MCNC: <25 NG/ML
ALPRAZ UR CFM-MCNC: <25 NG/ML
CHLORDIAZEP UR CFM-MCNC: <25 NG/ML
CLONAZEPAM UR CFM-MCNC: <25 NG/ML
CODEINE UR CFM-MCNC: <50 NG/ML
DIAZEPAM UR CFM-MCNC: <25 NG/ML
EDDP UR CFM-MCNC: <25 NG/ML
FACT X ACT/NOR PPP: 49 % (ref 75–130)
FENTANYL UR CFM-MCNC: <2.5 NG/ML
HYDROCODONE CTO UR CFM-MCNC: <25 NG/ML
HYDROMORPHONE UR CFM-MCNC: >2500 NG/ML
LORAZEPAM UR CFM-MCNC: <25 NG/ML
METHADONE UR CFM-MCNC: <25 NG/ML
MIDAZOLAM UR CFM-MCNC: 61 NG/ML
MORPHINE UR CFM-MCNC: <50 NG/ML
NORDIAZEPAM UR CFM-MCNC: <25 NG/ML
NORFENTANYL UR CFM-MCNC: 4.4 NG/ML
NORHYDROCODONE UR CFM-MCNC: <25 NG/ML
NOROXYCODONE UR CFM-MCNC: 391 NG/ML
NORTRAMADOL UR-MCNC: <50 NG/ML
OXAZEPAM UR CFM-MCNC: <25 NG/ML
OXYCODONE UR CFM-MCNC: 96 NG/ML
OXYMORPHONE UR CFM-MCNC: 737 NG/ML
TEMAZEPAM UR CFM-MCNC: <25 NG/ML
TRAMADOL UR CFM-MCNC: <50 NG/ML
ZOLPIDEM UR CFM-MCNC: <25 NG/ML
ZOLPIDEM UR-MCNC: <25 NG/ML

## 2025-03-10 PROCEDURE — 99215 OFFICE O/P EST HI 40 MIN: CPT | Performed by: PEDIATRICS

## 2025-03-10 PROCEDURE — G2211 COMPLEX E/M VISIT ADD ON: HCPCS | Performed by: PEDIATRICS

## 2025-03-10 RX ORDER — CYCLOBENZAPRINE HCL 10 MG
10 TABLET ORAL 3 TIMES DAILY PRN
Qty: 30 TABLET | Refills: 3 | Status: SHIPPED | OUTPATIENT
Start: 2025-03-10

## 2025-03-10 RX ORDER — OXYCODONE HYDROCHLORIDE 10 MG/1
10 TABLET ORAL EVERY 4 HOURS PRN
Qty: 75 TABLET | Refills: 0 | Status: SHIPPED | OUTPATIENT
Start: 2025-03-10 | End: 2025-03-24 | Stop reason: SDUPTHER

## 2025-03-10 ASSESSMENT — PAIN SCALES - GENERAL: PAINLEVEL_OUTOF10: 6

## 2025-03-12 ENCOUNTER — ANTICOAGULATION - WARFARIN VISIT (OUTPATIENT)
Dept: CARDIOLOGY | Facility: CLINIC | Age: 55
End: 2025-03-12
Payer: COMMERCIAL

## 2025-03-12 DIAGNOSIS — I26.99 RECURRENT PULMONARY EMBOLISM (MULTI): ICD-10-CM

## 2025-03-12 DIAGNOSIS — I82.210 THROMBOSIS OF SUPERIOR VENA CAVA (MULTI): ICD-10-CM

## 2025-03-12 DIAGNOSIS — I82.4Z9 DEEP VEIN THROMBOSIS (DVT) OF DISTAL VEIN OF LOWER EXTREMITY, UNSPECIFIED CHRONICITY, UNSPECIFIED LATERALITY (MULTI): ICD-10-CM

## 2025-03-12 LAB
POC INR: 1.7
POC INR: 1.7
POC PROTHROMBIN TIME: NORMAL
POC PROTHROMBIN TIME: NORMAL

## 2025-03-12 PROCEDURE — 85610 PROTHROMBIN TIME: CPT | Mod: QW

## 2025-03-12 PROCEDURE — 99211 OFF/OP EST MAY X REQ PHY/QHP: CPT

## 2025-03-12 NOTE — PROGRESS NOTES
Patient identification verified with 2 identifiers.    Location: [facility] Patient Self-Testing Program [phone] Yearly monitor review      Referring Physician: Dr Whaley  Enrollment/ Re-enrollment date: 25   INR Goal: 2.0-3.0  INR monitoring is per [facility] protocol.  Anticoagulation Medication: warfarin  Indication: Deep Vein Thrombosis (DVT)    Subjective   Bleeding signs/symptoms: No    Bruising: No   Major bleeding event: No  Thrombosis signs/symptoms: No  Thromboembolic event: No  Missed doses: No  Extra doses: No  Medication changes: No  Dietary changes: No  Change in health: Yes  pt was off coumadin for 2-3 days for a Blood procedure. She was in the hospital from  - . Pt has been back on coumadin since .  Change in activity: No  Alcohol: No  Other concerns: NoPt was off coumadin for 2-3 days for a Blood procedure. She was in the hospital from  - . Pt has been back on coumadin since .    Upcoming Procedures:  Does the Patient Have any upcoming procedures that require interruption in anticoagulation therapy? no  Does the patient require bridging? yes pt has only been taking Lovenox once per day. Instructed pt to follow the directions and take it twice per day      Anticoagulation Summary  As of 3/12/2025      INR goal:  2.0-3.0   TTR:  80.6% (11.6 mo)   INR used for dosin.70 (3/12/2025)   Weekly warfarin total:  37.5 mg               Assessment/Plan   Subtherapeutic     1. New dose:  will have pt take an extra 2.5mg today     2. Next INR:  pt instructed to check INR on  in the AM      Education provided to patient during the visit:  Patient instructed to call in interim with questions, concerns and changes.   Patient educated on interactions between medications and warfarin.   Patient educated on dietary consistency in vitamin k consumption.   Patient educated on affects of alcohol consumption while taking warfarin.   Patient educated on  signs of bleeding/clotting.   Patient educated on compliance with dosing, follow up appointments, and prescribed plan of care.

## 2025-03-14 ENCOUNTER — ANTICOAGULATION - WARFARIN VISIT (OUTPATIENT)
Dept: CARDIOLOGY | Facility: CLINIC | Age: 55
End: 2025-03-14
Payer: COMMERCIAL

## 2025-03-14 DIAGNOSIS — I82.210 THROMBOSIS OF SUPERIOR VENA CAVA (MULTI): ICD-10-CM

## 2025-03-14 DIAGNOSIS — I82.4Z9 DEEP VEIN THROMBOSIS (DVT) OF DISTAL VEIN OF LOWER EXTREMITY, UNSPECIFIED CHRONICITY, UNSPECIFIED LATERALITY (MULTI): ICD-10-CM

## 2025-03-14 DIAGNOSIS — I26.99 RECURRENT PULMONARY EMBOLISM (MULTI): Primary | ICD-10-CM

## 2025-03-14 LAB
INR IN PPP BY COAGULATION ASSAY EXTERNAL: 2 (ref 2–3)
PROTHROMBIN TIME (PT) IN PPP BY COAGULATION ASSAY EXTERNAL: NORMAL

## 2025-03-14 NOTE — PROGRESS NOTES
Patient identification verified with 2 identifiers.    Location: [facility] Patient Self-Testing Program [phone] Yearly monitor review      Referring Physician:  Erich Whaley MD  Enrollment/ Re-enrollment date: 2025   INR Goal: 2.0-3.0  INR monitoring is per [facility] protocol.  Anticoagulation Medication: warfarin  Indication: Deep Vein Thrombosis (DVT)    Subjective   Received faxed INR result from [facility].  I called and spoke to Pt.  Pt identified using two Pt identifiers, name and .  INR result of 2.0 from 3/14 discussed.   Bleeding signs/symptoms: No.  Pt denies.    Bruising: No.  Pt denies.  Major bleeding event: No  Thrombosis signs/symptoms: No  Thromboembolic event: No  Missed doses: No.  Pt denies.  Extra doses: Yes.  Pt states that she took One time extra dose of 2.5mg on 3/12/25 as instructed.  Medication changes: Yes.  Pt is off Lovenox Bridge.  Dietary changes: No.  Pt denies.  Change in health: No.  Pt denies.  Change in activity: No  Alcohol: No  Other concerns: No.  Pt was off coumadin for 2-3 days for a Blood procedure. She was in the hospital from  - . Pt has been back on coumadin since .    Upcoming Procedures:  Does the Patient Have any upcoming procedures that require interruption in anticoagulation therapy? no  Does the patient require bridging? no     Pt had INR done at [facility] on 3/12/25 and result was 1.7.  Pt was given a One time extra dose of 2.5mg (7.5mg on 3/12/25 instead of 5mg).  Pt is taking 37.5mg of warfarin weekly.  Anticoagulation Summary  As of 3/14/2025      INR goal:  2.0-3.0   TTR:  80.6% (11.6 mo)   INR used for dosing:  --   Weekly warfarin total:  37.5 mg               Assessment/Plan   Therapeutic     1. New dose:  Per Pt she is only been taking 5mg of warfarin daily and not 7.5mg on . By the schedule, it appears only on 3/11/25 was a reduced dose per her physician bu Pt insists its 5mg daily.  Dosing schedule corrected to what Pt  states is her dosing,  not a dose adjustment.     2. Next INR: 1 week.  Pt will self test INR in 1 week on 3/21/25.  3. Current and new weekly warfarin dosing reviewed and verified with Pt. Pt read back correctly to me.   4. Pt states she is off Lovenox.    Education provided to patient during the visit:  Patient instructed to call in interim with questions, concerns and changes.   Patient educated on interactions between medications and warfarin.   Patient educated on dietary consistency in vitamin k consumption.   Patient educated on affects of alcohol consumption while taking warfarin.   Patient educated on signs of bleeding/clotting.   Patient educated on compliance with dosing, follow up appointments, and prescribed plan of care.

## 2025-03-19 ENCOUNTER — APPOINTMENT (OUTPATIENT)
Dept: OPHTHALMOLOGY | Facility: CLINIC | Age: 55
End: 2025-03-19
Payer: COMMERCIAL

## 2025-03-19 DIAGNOSIS — H36.822: ICD-10-CM

## 2025-03-19 DIAGNOSIS — H53.9 VISUAL DISTURBANCES: Primary | ICD-10-CM

## 2025-03-19 DIAGNOSIS — H53.15 VISUAL DISTORTIONS OF SHAPE AND SIZE: ICD-10-CM

## 2025-03-19 PROCEDURE — 99214 OFFICE O/P EST MOD 30 MIN: CPT | Performed by: OPHTHALMOLOGY

## 2025-03-19 PROCEDURE — 92134 CPTRZ OPH DX IMG PST SGM RTA: CPT | Performed by: OPHTHALMOLOGY

## 2025-03-19 PROCEDURE — 67228 TREATMENT X10SV RETINOPATHY: CPT | Mod: LEFT SIDE | Performed by: OPHTHALMOLOGY

## 2025-03-19 ASSESSMENT — EXTERNAL EXAM - RIGHT EYE: OD_EXAM: NORMAL

## 2025-03-19 ASSESSMENT — VISUAL ACUITY
OD_CC: 20/30
METHOD: SNELLEN - LINEAR
CORRECTION_TYPE: GLASSES
OS_PH_CC: 20/30
OS_CC: 20/40
OD_CC+: -2

## 2025-03-19 ASSESSMENT — SLIT LAMP EXAM - LIDS
COMMENTS: GOOD POSITION
COMMENTS: GOOD POSITION

## 2025-03-19 ASSESSMENT — TONOMETRY
OS_IOP_MMHG: 9
OD_IOP_MMHG: 12
IOP_METHOD: GOLDMANN APPLANATION

## 2025-03-19 ASSESSMENT — CUP TO DISC RATIO: OS_RATIO: 0.3

## 2025-03-19 ASSESSMENT — EXTERNAL EXAM - LEFT EYE: OS_EXAM: NORMAL

## 2025-03-19 ASSESSMENT — ENCOUNTER SYMPTOMS: EYES NEGATIVE: 1

## 2025-03-19 NOTE — PROGRESS NOTES
#Proliferative sickle cell retinopathy OS  #Vitreous Hemorrhage left eye  #S/p PRP OS   - B-scan OS 3/02/25: Possible area of tear / TRD temporally with diffuse VH   - Peripheral appearance is consistent with involuted sea fans OS  - No evidence of TRD OS today 3/19/25, but recommend fill in PRP OS as vitreous hemorrhage appears to have cleared somewhat allowing additional laser application  -Discussed r/b/a of PRP OS, patient amenable to PRP OS 3/19/25, informed consent signed, see procedure note      Follow up in one month

## 2025-03-21 ENCOUNTER — ANTICOAGULATION - WARFARIN VISIT (OUTPATIENT)
Dept: CARDIOLOGY | Facility: CLINIC | Age: 55
End: 2025-03-21
Payer: COMMERCIAL

## 2025-03-21 DIAGNOSIS — G89.4 CHRONIC PAIN SYNDROME: ICD-10-CM

## 2025-03-21 DIAGNOSIS — D57.219 SICKLE-CELL-HEMOGLOBIN C DISEASE WITH CRISIS (MULTI): Primary | ICD-10-CM

## 2025-03-21 DIAGNOSIS — I82.210 THROMBOSIS OF SUPERIOR VENA CAVA (MULTI): ICD-10-CM

## 2025-03-21 DIAGNOSIS — I26.99 RECURRENT PULMONARY EMBOLISM (MULTI): Primary | ICD-10-CM

## 2025-03-21 DIAGNOSIS — I82.4Z9 DEEP VEIN THROMBOSIS (DVT) OF DISTAL VEIN OF LOWER EXTREMITY, UNSPECIFIED CHRONICITY, UNSPECIFIED LATERALITY (MULTI): ICD-10-CM

## 2025-03-21 NOTE — PROGRESS NOTES
Patient identification verified with 2 identifiers.    Location: [facility] Patient Self-Testing Program [phone]    Referring Physician:  Erich Whaley MD  Enrollment/ Re-enrollment date: 2025   INR Goal: 2.0-3.0  INR monitoring is per [facility] protocol.  Anticoagulation Medication: warfarin  Indication: Deep Vein Thrombosis (DVT)    Subjective   Bleeding signs/symptoms: No  Bruising: No   Major bleeding event: No  Thrombosis signs/symptoms: No  Thromboembolic event: No  Missed doses: No  Extra doses: No  Medication changes: No  Dietary changes: No  Change in health: No  Change in activity: No  Alcohol: No  Other concerns: No    Upcoming Procedures:  Does the Patient Have any upcoming procedures that require interruption in anticoagulation therapy? no  Does the patient require bridging? no      Anticoagulation Summary  As of 3/21/2025      INR goal:  2.0-3.0   TTR:  80.6% (11.9 mo)   INR used for dosin.50 (3/21/2025)   Weekly warfarin total:  35 mg               Assessment/Plan   Therapeutic     1. New dose: no change    2. Next INR: 2 weeks    Received faxed INR self test result and called patient. Pt identification verified with 2 pt identifiers. Previous INR was therapeutic at 2. Today, self test reveals INR of 2.5 which is therapeutic for range of 2-3. Current incoming dose schedule reviewed with pt and read back correctly to me. Confirmed with pt that she is NOT taking Lovenox shots. Pt denies complications related to ACT therapy. Pt denies changes in diet, medications, social habits or general health. Will maintain dose and retest in 2 weeks. Outgoing dose schedule reviewed with pt and read back correctly to me. Pt instructed to call in interim with questions, concerns and changes.    Education provided to patient during the visit:  Patient instructed to call in interim with questions, concerns and changes.   Patient educated on interactions between medications and warfarin.   Patient educated on dietary  consistency in vitamin k consumption.   Patient educated on affects of alcohol consumption while taking warfarin.   Patient educated on signs of bleeding/clotting.   Patient educated on compliance with dosing, follow up appointments, and prescribed plan of care.

## 2025-03-24 ENCOUNTER — TELEPHONE (OUTPATIENT)
Dept: ADMISSION | Facility: HOSPITAL | Age: 55
End: 2025-03-24
Payer: COMMERCIAL

## 2025-03-24 DIAGNOSIS — D57.00 SICKLE CELL CRISIS (MULTI): ICD-10-CM

## 2025-03-24 RX ORDER — OXYCODONE HYDROCHLORIDE 10 MG/1
10 TABLET ORAL EVERY 4 HOURS PRN
Qty: 75 TABLET | Refills: 0 | Status: SHIPPED | OUTPATIENT
Start: 2025-03-24

## 2025-03-24 RX ORDER — ONDANSETRON 4 MG/1
4 TABLET, FILM COATED ORAL EVERY 8 HOURS PRN
Qty: 20 TABLET | Refills: 1 | Status: SHIPPED | OUTPATIENT
Start: 2025-03-24

## 2025-03-24 NOTE — TELEPHONE ENCOUNTER
Medication Refill-  oxycodone  [facility]    Pharmacy-  [facility] #[id number]

## 2025-03-27 ENCOUNTER — TELEPHONE (OUTPATIENT)
Dept: HEMATOLOGY/ONCOLOGY | Facility: HOSPITAL | Age: 55
End: 2025-03-27
Payer: COMMERCIAL

## 2025-03-27 NOTE — TELEPHONE ENCOUNTER
Call made to pt requesting to have lab work collected at [facility] on Monday 3/31. Pt also notified based on lab results, pt maybe scheduled for a modified partial exchange. Pt acknowledged understanding.

## 2025-03-31 ENCOUNTER — LAB (OUTPATIENT)
Dept: LAB | Facility: HOSPITAL | Age: 55
End: 2025-03-31
Payer: COMMERCIAL

## 2025-03-31 DIAGNOSIS — I82.4Z9 DEEP VEIN THROMBOSIS (DVT) OF DISTAL VEIN OF LOWER EXTREMITY, UNSPECIFIED CHRONICITY, UNSPECIFIED LATERALITY: ICD-10-CM

## 2025-03-31 DIAGNOSIS — Z86.718 PERSONAL HISTORY OF OTHER VENOUS THROMBOSIS AND EMBOLISM: ICD-10-CM

## 2025-03-31 DIAGNOSIS — Z92.89 HISTORY OF EXCHANGE TRANSFUSION: ICD-10-CM

## 2025-03-31 DIAGNOSIS — Z79.01 WARFARIN ANTICOAGULATION: ICD-10-CM

## 2025-03-31 DIAGNOSIS — D57.219 SICKLE-CELL-HEMOGLOBIN C DISEASE WITH CRISIS (MULTI): ICD-10-CM

## 2025-03-31 DIAGNOSIS — D57.1 SICKLE CELL DISEASE WITHOUT CRISIS (MULTI): ICD-10-CM

## 2025-03-31 LAB
ABO GROUP (TYPE) IN BLOOD: NORMAL
ACANTHOCYTES BLD QL SMEAR: NORMAL
ALBUMIN SERPL BCP-MCNC: 3.7 G/DL (ref 3.4–5)
ALP SERPL-CCNC: 119 U/L (ref 33–110)
ALT SERPL W P-5'-P-CCNC: 5 U/L (ref 7–45)
ANION GAP SERPL CALC-SCNC: 13 MMOL/L (ref 10–20)
ANTIBODY SCREEN: NORMAL
AST SERPL W P-5'-P-CCNC: 11 U/L (ref 9–39)
BASOPHILS # BLD AUTO: 0.04 X10*3/UL (ref 0–0.1)
BASOPHILS NFR BLD AUTO: 0.3 %
BILIRUB SERPL-MCNC: 1.4 MG/DL (ref 0–1.2)
BUN SERPL-MCNC: 13 MG/DL (ref 6–23)
BURR CELLS BLD QL SMEAR: NORMAL
CA-I BLD-SCNC: 1.16 MMOL/L (ref 1.1–1.33)
CALCIUM SERPL-MCNC: 9.2 MG/DL (ref 8.6–10.3)
CHLORIDE SERPL-SCNC: 101 MMOL/L (ref 98–107)
CO2 SERPL-SCNC: 27 MMOL/L (ref 21–32)
CREAT SERPL-MCNC: 1.18 MG/DL (ref 0.5–1.05)
DACRYOCYTES BLD QL SMEAR: NORMAL
EGFRCR SERPLBLD CKD-EPI 2021: 54 ML/MIN/1.73M*2
EOSINOPHIL # BLD AUTO: 0.23 X10*3/UL (ref 0–0.7)
EOSINOPHIL NFR BLD AUTO: 1.6 %
ERYTHROCYTE [DISTWIDTH] IN BLOOD BY AUTOMATED COUNT: 26.7 % (ref 11.5–14.5)
FERRITIN SERPL-MCNC: 17 NG/ML (ref 8–150)
GIANT PLATELETS BLD QL SMEAR: NORMAL
GLUCOSE SERPL-MCNC: 120 MG/DL (ref 74–99)
HCT VFR BLD AUTO: 33.4 % (ref 36–46)
HEMOGLOBIN A2: 3.2 % (ref 2–3.5)
HEMOGLOBIN A: 43.2 % (ref 95.8–98)
HEMOGLOBIN C: 26.3 %
HEMOGLOBIN F: 0.4 % (ref 0–2)
HEMOGLOBIN IDENTIFICATION INTERPRETATION: ABNORMAL
HEMOGLOBIN S: 26.9 %
HGB BLD-MCNC: 10.9 G/DL (ref 12–16)
HGB RETIC QN: 23 PG (ref 28–38)
HOLD SPECIMEN: NORMAL
HYPOCHROMIA BLD QL SMEAR: NORMAL
IMM GRANULOCYTES # BLD AUTO: 0.05 X10*3/UL (ref 0–0.7)
IMM GRANULOCYTES NFR BLD AUTO: 0.4 % (ref 0–0.9)
IMMATURE RETIC FRACTION: 17.5 %
LDH SERPL L TO P-CCNC: 167 U/L (ref 84–246)
LYMPHOCYTES # BLD AUTO: 2.04 X10*3/UL (ref 1.2–4.8)
LYMPHOCYTES NFR BLD AUTO: 14.4 %
MCH RBC QN AUTO: 25 PG (ref 26–34)
MCHC RBC AUTO-ENTMCNC: 32.6 G/DL (ref 32–36)
MCV RBC AUTO: 77 FL (ref 80–100)
MONOCYTES # BLD AUTO: 0.79 X10*3/UL (ref 0.1–1)
MONOCYTES NFR BLD AUTO: 5.6 %
NEUTROPHILS # BLD AUTO: 11.05 X10*3/UL (ref 1.2–7.7)
NEUTROPHILS NFR BLD AUTO: 77.7 %
NEUTS VAC BLD QL SMEAR: PRESENT
NRBC BLD-RTO: 5.7 /100 WBCS (ref 0–0)
OVALOCYTES BLD QL SMEAR: NORMAL
PATH REVIEW-HGB IDENTIFICATION: ABNORMAL
PLATELET # BLD AUTO: 360 X10*3/UL (ref 150–450)
POLYCHROMASIA BLD QL SMEAR: NORMAL
POTASSIUM SERPL-SCNC: 3.9 MMOL/L (ref 3.5–5.3)
PROT SERPL-MCNC: 7.5 G/DL (ref 6.4–8.2)
RBC # BLD AUTO: 4.36 X10*6/UL (ref 4–5.2)
RBC MORPH BLD: NORMAL
RETICS #: 0.35 X10*6/UL (ref 0.02–0.08)
RETICS/RBC NFR AUTO: 8 % (ref 0.5–2)
RH FACTOR (ANTIGEN D): NORMAL
SCHISTOCYTES BLD QL SMEAR: NORMAL
SODIUM SERPL-SCNC: 137 MMOL/L (ref 136–145)
TARGETS BLD QL SMEAR: NORMAL
WBC # BLD AUTO: 14.2 X10*3/UL (ref 4.4–11.3)

## 2025-03-31 PROCEDURE — 82330 ASSAY OF CALCIUM: CPT

## 2025-03-31 PROCEDURE — 82728 ASSAY OF FERRITIN: CPT

## 2025-03-31 PROCEDURE — 87075 CULTR BACTERIA EXCEPT BLOOD: CPT

## 2025-03-31 PROCEDURE — 83615 LACTATE (LD) (LDH) ENZYME: CPT

## 2025-03-31 PROCEDURE — 36415 COLL VENOUS BLD VENIPUNCTURE: CPT

## 2025-03-31 PROCEDURE — 85025 COMPLETE CBC W/AUTO DIFF WBC: CPT

## 2025-03-31 PROCEDURE — 85045 AUTOMATED RETICULOCYTE COUNT: CPT

## 2025-03-31 PROCEDURE — 80053 COMPREHEN METABOLIC PANEL: CPT

## 2025-03-31 PROCEDURE — 83021 HEMOGLOBIN CHROMOTOGRAPHY: CPT

## 2025-03-31 PROCEDURE — 86900 BLOOD TYPING SEROLOGIC ABO: CPT

## 2025-03-31 PROCEDURE — 86901 BLOOD TYPING SEROLOGIC RH(D): CPT

## 2025-03-31 PROCEDURE — 83020 HEMOGLOBIN ELECTROPHORESIS: CPT | Performed by: PATHOLOGY

## 2025-04-03 ASSESSMENT — ENCOUNTER SYMPTOMS
ABDOMINAL PAIN: 0
SORE THROAT: 0
DIAPHORESIS: 0
CONSTIPATION: 0
CHEST TIGHTNESS: 0
COUGH: 0
HEMOPTYSIS: 0
ADENOPATHY: 0
DEPRESSION: 0
DYSURIA: 0
FLANK PAIN: 0
BRUISES/BLEEDS EASILY: 0
BLOOD IN STOOL: 0
VOMITING: 0
DIARRHEA: 0
MYALGIAS: 0
WOUND: 0
HEADACHES: 0
LIGHT-HEADEDNESS: 0
FEVER: 0
WHEEZING: 0
FATIGUE: 1
SCLERAL ICTERUS: 0
PALPITATIONS: 0
EYE PROBLEMS: 0
NUMBNESS: 0
HEMATURIA: 0
LEG SWELLING: 0
EXTREMITY WEAKNESS: 0
FREQUENCY: 0
ARTHRALGIAS: 1
NERVOUS/ANXIOUS: 0
CHILLS: 0
BACK PAIN: 1
NAUSEA: 0

## 2025-04-03 NOTE — PROGRESS NOTES
SICKLE CELL OUTPATIENT NOTE  Patient ID: Senait Narvaez   Visit Type: Follow up visit     ASSESSMENT AND PLAN  Senait Narvaez is 55 y.o. 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  She has continued blurry vision in the left eye, and also has a history of sickle cell proliferative retinopathy.  She still has bilateral blurry vision which is unchanged from before.  Encounter for DMT with chronic full automated red cell exchange transfusion, indication being multisystem organ failure (renal, liver, respiratory failure, and NSTEMI)   History of recurrent arrhythmia    Recurrent VTE/PE with SVC syndrome, on lifelong anticoagulation with warfarin   CAN and nocturnal hypoxia on night time supplemental oxygen.   Chronic constipation  Weight loss of about of about 10 kg over the past year.  Currently weighs 97 kg, and weight about 110 kg about a year ago.     PLAN   -I have contemplated, transitioning patient to manual red blood cell exchange transfusions, versus automated Red cell exchange transfusion as outpatient, on account of recent prolonged admissions following Red cell exchange inpatient.  Will repeat blood work in a couple of weeks time and depending on her hemoglobin S and C levels we will make a decision.  If her combined hemoglobin S and C levels are more than 50% we will proceed with a full automated exchange transfusion as an outpatient.  Well between transfusions will remain every 6 weeks  -Follow up with ophthalmology as scheduled   - No changes in her current home oral regimen and this is with tylenol 650 mg every 6 hours prn for mild to moderate pain   Oral oxycodone 10-20 mg every 4-6 hours as needed for moderate to severe and breakthrough pain    Non pharmacologic methods of pain control   Flexeril as needed as a muscle relaxant  - Reviewed OARRS  -Continue follow-up with cardiology for  management of arrhythmia and also continue metoprolol 12.5 mg BID  -Continue warfarin 5 mg daily with target INR of 2-3    - Continue 2L night time oxygen   - Daily folic acid 1 mg   - Colace and miralax prn for chronic constipation  -Observe weight loss for now-patient encouraged to eat healthy  -Will call with follow-up.    Chief Complaint: Follow up for HB SC SCD, chronic pain and management of chronic red cell exchange transfusion      Interval History: Senait is present for follow-up of hemoglobin SC sickle cell disease.  She remains on chronic Red cell transfusion, which has been complicated by prolonged admission to hospital for about a week after each transfusion.  Red cell transfusion is being done inpatient on account of recurrent arrhythmia including SVT.  She has not had arrhythmia recently.  Recent admission was complicated by blurry vision secondary to vitreous hemorrhage and had left eye laser surgery, which was unsuccessful because of pain.  Patient is still present and unchanged.  Breast mass has since resolved and not recurred.  She continues to have chronic pain from a sickle cell disease and has been taking her home oral pain medication as prescribed.  Pain in clinic today is about 6-7 out of 10.  She denies chest pain, chest tightness, or shortness of breath.  Her appetite and energy levels have been low.  She continues to tolerate anticoagulation with warfarin, and denies increased bruising or bleeding.  She remains on supplemental nighttime oxygen. She denies chest pain or palpitations. Facial and neck swelling are unchanged from prior. She denies chest pain or chest tightness, headaches, dizziness, nausea or vomiting. She is afebrile, stooling well with prn laxatives. She denies focal neurologic deficits or leg ulcers.      Review of System:   Review of Systems   Constitutional:  Positive for fatigue. Negative for chills, diaphoresis and fever.   HENT:   Negative for mouth sores, nosebleeds and  sore throat.    Eyes:  Negative for eye problems and icterus.   Respiratory:  Negative for chest tightness, cough, hemoptysis and wheezing.    Cardiovascular:  Negative for chest pain, leg swelling and palpitations.   Gastrointestinal:  Negative for abdominal pain, blood in stool, constipation, diarrhea, nausea and vomiting.   Genitourinary:  Negative for dysuria, frequency and hematuria.    Musculoskeletal:  Positive for arthralgias and back pain. Negative for flank pain, gait problem and myalgias.   Skin:  Negative for itching, rash and wound.   Neurological:  Negative for extremity weakness, gait problem, headaches, light-headedness and numbness.   Hematological:  Negative for adenopathy. Does not bruise/bleed easily.   Psychiatric/Behavioral:  Negative for depression. The patient is not nervous/anxious.       Allergies:   Allergies   Allergen Reactions    Vancomycin Rash    Oxycontin [Oxycodone] Drowsiness     Current Medications:   Medication Documentation Review Audit       Reviewed by John Biggs MD (Physician) on 03/19/25 at 1043      Medication Order Taking? Sig Documenting Provider Last Dose Status   albuterol 90 mcg/actuation inhaler [id number] Yes Inhale 2 puffs every 6 hours if needed for wheezing. Erich Whaley MD  Active   ascorbic acid (Vitamin C) 500 mg chewable tablet [id number] Yes Chew 1 tablet (500 mg) once daily. Historical Provider, MD  Active   atropine 1 % ophthalmic solution [id number] Yes Administer 1 drop into the left eye once daily at bedtime. Please continue to administer to Left eye until seen at outpt clinic with ophthalmology this upcoming week Sonia Kerns, APRN-CNP  Active   cyclobenzaprine (Flexeril) 10 mg tablet [id number] Yes Take 1 tablet (10 mg) by mouth 3 times a day as needed for muscle spasms. Erich Whaley MD  Active   enoxaparin (Lovenox) 100 mg/mL syringe [id number] Yes Inject 1 mL (100 mg) under the skin 2 times a day. Continue to check INR daily at home. Once  INR is 2-3 STOP lovenox and continue warfarin daily Sonia Kerns APRN-CNP  Active   fluticasone (Flonase) 50 mcg/actuation nasal spray [id number] Yes Administer 2 sprays into each nostril once daily as needed for rhinitis or allergies. Historical Provider, MD  Active   folic acid (Folvite) 1 mg tablet [id number] Yes Take 1 tablet (1 mg) by mouth once daily. Historical Provider, MD  Active   furosemide (Lasix) 20 mg tablet [id number] Yes Take 1 tablet (20 mg) by mouth every other day. Ankur White DO  Active   loratadine (Claritin) 10 mg tablet [id number] Yes Take 1 tablet (10 mg) by mouth once daily as needed for allergies. Historical Provider, MD  Active   metoprolol tartrate (Lopressor) 25 mg tablet [id number] Yes Take 1 tablet (25 mg) by mouth 2 times a day. Ankur White DO  Active   multivitamin tablet [id number] Yes Take 1 tablet by mouth once daily. Historical Provider, MD  Active   naloxone (Narcan) 4 mg/0.1 mL nasal spray [id number] Yes Administer 1 spray (4 mg) into affected nostril(s) if needed for opioid reversal. May repeat every 2-3 minutes if needed, alternating nostrils, until medical assistance becomes available. Erich Whaley MD  Active   ondansetron (Zofran) 4 mg tablet [id number] Yes Take 1 tablet (4 mg) by mouth every 8 hours if needed for nausea or vomiting. Historical Provider, MD  Active   oxyCODONE (Roxicodone) 10 mg immediate release tablet [id number] Yes Take 1 tablet (10 mg) by mouth every 4 hours if needed for severe pain (7 - 10) (pain). Erich Whaley MD  Active   oxygen (O2) gas therapy [id number] Yes Inhale 1 each continuously. GRISEL Contreras-CNP  Active   traZODone (Desyrel) 50 mg tablet [id number] No Take 1 tablet (50 mg) by mouth as needed at bedtime for sleep. Lisa Giles MD Past Week  [phone]   triamcinolone (Kenalog) 0.1 % cream [id number] Yes Apply topically 2 times a day. Apply triamcinolone 0.1% cream to both the black plaques as well as the red  areas across the trunk and thighs TOMAS De  Active   warfarin (Coumadin) 5 mg tablet [id number] Yes Take 1 tablet (5 mg) by mouth once daily in the evening. Erich Whaley MD  Active   white petrolatum (Aquaphor) 41 % ointment ointment [id number] Yes Apply 2 Applications topically 2 times a day. Apply vaseline to all eroded/denuded areas. 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   Obstructive Sleep and significant nocturnal hemoglobin desaturation    Bilateral lower extremity edema, recurrent  SVC syndrome S/P balloon angioplasty of SVC/left brachiocephalic vein, removal of left sided Mediport catheter (1/21/20)  Syncopal episodes   Balloon removed via IR on April 2023.   SVT event with conversion with 1 dose 6mg of adenosine on 5/5/23  Admission June 2023 underwent RHC/LHC on 6/16 with mPA pressure 36, wedge 14, CI 4.2 and her coronary angiogram revealed no angiographic evidence of obstructive CAD. Dx of pulm HTN.     Family History:  Family History   Problem Relation    Diabetes Father    Gout Father    Sickle cell trait Father    Seizures Sister    Diabetes Other    Uterine cancer Other    Other (congenital blindness) Cousin     Social History:   Senait Narvaez  reports that she quit smoking about 11 years ago. Her smoking use included cigarettes. She has been exposed to tobacco smoke. She has never used smokeless tobacco.  reports that she does not currently  use drugs.    EXAMINATION FINDINGS   /50 (BP Location: Right arm, Patient Position: Sitting, BP Cuff Size: Large adult)   Pulse 99   Temp 36.4 °C (97.5 °F) (Skin)   Resp 17   Wt 97.1 kg (214 lb 1.6 oz)   SpO2 90%   BMI 35.63 kg/m²   2.11 meters squared    Physical Exam  Vitals and nursing note reviewed.   Constitutional:       General: She is not in acute distress.     Appearance: She is not toxic-appearing.   Neck:      Vascular: No carotid bruit.      Comments: SVC syndrome with distension of her neck veins  Cardiovascular:      Rate and Rhythm: Normal rate and regular rhythm.      Pulses: Normal pulses.      Heart sounds: No murmur heard.     No gallop.   Pulmonary:      Effort: Pulmonary effort is normal. No respiratory distress.      Breath sounds: Normal breath sounds. No wheezing.   Chest:      Comments: Left breast swollen with palpable mass. Right breast is normal.   Abdominal:      General: Bowel sounds are normal.      Palpations: Abdomen is soft.      Tenderness: There is no abdominal tenderness. There is no guarding.   Musculoskeletal:         General: No swelling or deformity.      Cervical back: No rigidity or tenderness.      Right lower leg: No edema.      Left lower leg: No edema.   Lymphadenopathy:      Cervical: No cervical adenopathy.   Skin:     General: Skin is warm.      Capillary Refill: Capillary refill takes less than 2 seconds.      Findings: No lesion or rash.   Neurological:      General: No focal deficit present.      Mental Status: She is alert and oriented to person, place, and time. Mental status is at baseline.      Cranial Nerves: No cranial nerve deficit.      Motor: No weakness.      Gait: Gait normal.   Psychiatric:         Mood and Affect: Mood normal.         Behavior: Behavior normal.       LABS   Latest Reference Range & Units 12/30/24 10:53   GLUCOSE 74 - 99 mg/dL 97   SODIUM 136 - 145 mmol/L 137   POTASSIUM 3.5 - 5.3 mmol/L 4.0   CHLORIDE 98 - 107 mmol/L 104    Bicarbonate 21 - 32 mmol/L 23   Anion Gap 10 - 20 mmol/L 14   Blood Urea Nitrogen 6 - 23 mg/dL 11   Creatinine 0.50 - 1.05 mg/dL 0.76   EGFR >60 mL/min/1.73m*2 >90   Calcium 8.6 - 10.6 mg/dL 9.1   PHOSPHORUS 2.5 - 4.9 mg/dL 3.8   Albumin 3.4 - 5.0 g/dL 3.4        Erich Whaley MD

## 2025-04-04 ENCOUNTER — ANTICOAGULATION - WARFARIN VISIT (OUTPATIENT)
Dept: CARDIOLOGY | Facility: CLINIC | Age: 55
End: 2025-04-04
Payer: COMMERCIAL

## 2025-04-04 DIAGNOSIS — I82.4Z9 DEEP VEIN THROMBOSIS (DVT) OF DISTAL VEIN OF LOWER EXTREMITY, UNSPECIFIED CHRONICITY, UNSPECIFIED LATERALITY: ICD-10-CM

## 2025-04-04 DIAGNOSIS — I26.99 RECURRENT PULMONARY EMBOLISM (MULTI): Primary | ICD-10-CM

## 2025-04-04 DIAGNOSIS — I82.210 THROMBOSIS OF SUPERIOR VENA CAVA (MULTI): ICD-10-CM

## 2025-04-04 LAB
BACTERIA BLD CULT: NORMAL
INR IN PPP BY COAGULATION ASSAY EXTERNAL: 2.3
PROTHROMBIN TIME (PT) IN PPP BY COAGULATION ASSAY EXTERNAL: NORMAL

## 2025-04-04 NOTE — PROGRESS NOTES
Patient identification verified with 2 identifiers.    Location: [facility] Patient Self-Testing Program [phone]    Referring Physician:  Erich Whaley MD  Enrollment/ Re-enrollment date: 2025   INR Goal: 2.0-3.0  INR monitoring is per [facility] protocol.  Anticoagulation Medication: warfarin  Indication: Deep Vein Thrombosis (DVT)    Subjective   Bleeding signs/symptoms: No  Bruising: No   Major bleeding event: No  Thrombosis signs/symptoms: No  Thromboembolic event: No  Missed doses: No  Extra doses: No  Medication changes: No  Dietary changes: No  Change in health: No  Change in activity: No  Alcohol: No  Other concerns: No    Upcoming Procedures:  Does the Patient Have any upcoming procedures that require interruption in anticoagulation therapy? no  Does the patient require bridging? no      Anticoagulation Summary  As of 2025      INR goal:  2.0-3.0   TTR:  81.4% (1 y)   INR used for dosin.30 (2025)   Weekly warfarin total:  35 mg               Assessment/Plan   Therapeutic     1. New dose: no change    2. Next INR: 2 weeks      Education provided to patient during the visit:  Patient instructed to call in interim with questions, concerns and changes.   Patient educated on compliance with dosing, follow up appointments, and prescribed plan of care.

## 2025-04-07 ENCOUNTER — TELEPHONE (OUTPATIENT)
Dept: HEMATOLOGY/ONCOLOGY | Facility: HOSPITAL | Age: 55
End: 2025-04-07

## 2025-04-07 ENCOUNTER — TELEPHONE (OUTPATIENT)
Dept: ADMISSION | Facility: HOSPITAL | Age: 55
End: 2025-04-07
Payer: COMMERCIAL

## 2025-04-07 ENCOUNTER — OFFICE VISIT (OUTPATIENT)
Dept: HEMATOLOGY/ONCOLOGY | Facility: HOSPITAL | Age: 55
End: 2025-04-07
Payer: COMMERCIAL

## 2025-04-07 VITALS
HEART RATE: 84 BPM | BODY MASS INDEX: 35.44 KG/M2 | RESPIRATION RATE: 18 BRPM | TEMPERATURE: 97.2 F | OXYGEN SATURATION: 98 % | SYSTOLIC BLOOD PRESSURE: 129 MMHG | DIASTOLIC BLOOD PRESSURE: 64 MMHG | WEIGHT: 212.96 LBS

## 2025-04-07 DIAGNOSIS — Z86.711 HISTORY OF PULMONARY EMBOLUS (PE): ICD-10-CM

## 2025-04-07 DIAGNOSIS — D57.00 SICKLE CELL CRISIS (MULTI): ICD-10-CM

## 2025-04-07 DIAGNOSIS — D57.00 SICKLE CELL CRISIS (MULTI): Primary | ICD-10-CM

## 2025-04-07 LAB
ALBUMIN SERPL BCP-MCNC: 3.7 G/DL (ref 3.4–5)
ALP SERPL-CCNC: 120 U/L (ref 33–110)
ALT SERPL W P-5'-P-CCNC: 7 U/L (ref 7–45)
ANION GAP SERPL CALC-SCNC: 10 MMOL/L (ref 10–20)
AST SERPL W P-5'-P-CCNC: 12 U/L (ref 9–39)
BASOPHILS # BLD AUTO: 0.04 X10*3/UL (ref 0–0.1)
BASOPHILS NFR BLD AUTO: 0.4 %
BILIRUB SERPL-MCNC: 1.3 MG/DL (ref 0–1.2)
BUN SERPL-MCNC: 10 MG/DL (ref 6–23)
BURR CELLS BLD QL SMEAR: NORMAL
CALCIUM SERPL-MCNC: 9 MG/DL (ref 8.6–10.3)
CHLORIDE SERPL-SCNC: 102 MMOL/L (ref 98–107)
CO2 SERPL-SCNC: 29 MMOL/L (ref 21–32)
CREAT SERPL-MCNC: 0.95 MG/DL (ref 0.5–1.05)
DACRYOCYTES BLD QL SMEAR: NORMAL
EGFRCR SERPLBLD CKD-EPI 2021: 70 ML/MIN/1.73M*2
EOSINOPHIL # BLD AUTO: 0.25 X10*3/UL (ref 0–0.7)
EOSINOPHIL NFR BLD AUTO: 2.2 %
ERYTHROCYTE [DISTWIDTH] IN BLOOD BY AUTOMATED COUNT: 26.9 % (ref 11.5–14.5)
GLUCOSE SERPL-MCNC: 100 MG/DL (ref 74–99)
HCT VFR BLD AUTO: 31.3 % (ref 36–46)
HGB BLD-MCNC: 10.3 G/DL (ref 12–16)
HGB RETIC QN: 23 PG (ref 28–38)
IMM GRANULOCYTES # BLD AUTO: 0.05 X10*3/UL (ref 0–0.7)
IMM GRANULOCYTES NFR BLD AUTO: 0.4 % (ref 0–0.9)
IMMATURE RETIC FRACTION: 45.5 %
INR PPP: 1.3 (ref 0.9–1.1)
LDH SERPL L TO P-CCNC: 177 U/L (ref 84–246)
LYMPHOCYTES # BLD AUTO: 1.24 X10*3/UL (ref 1.2–4.8)
LYMPHOCYTES NFR BLD AUTO: 11 %
MCH RBC QN AUTO: 24.3 PG (ref 26–34)
MCHC RBC AUTO-ENTMCNC: 32.9 G/DL (ref 32–36)
MCV RBC AUTO: 74 FL (ref 80–100)
MONOCYTES # BLD AUTO: 0.72 X10*3/UL (ref 0.1–1)
MONOCYTES NFR BLD AUTO: 6.4 %
NEUTROPHILS # BLD AUTO: 8.94 X10*3/UL (ref 1.2–7.7)
NEUTROPHILS NFR BLD AUTO: 79.6 %
NRBC BLD-RTO: 6.6 /100 WBCS (ref 0–0)
PLATELET # BLD AUTO: 371 X10*3/UL (ref 150–450)
PLATELET CLUMP BLD QL SMEAR: PRESENT
POLYCHROMASIA BLD QL SMEAR: NORMAL
POTASSIUM SERPL-SCNC: 4.1 MMOL/L (ref 3.5–5.3)
PROT SERPL-MCNC: 7.4 G/DL (ref 6.4–8.2)
PROTHROMBIN TIME: 14.6 SECONDS (ref 9.8–12.4)
RBC # BLD AUTO: 4.24 X10*6/UL (ref 4–5.2)
RBC MORPH BLD: NORMAL
RETICS #: 0.01 X10*6/UL (ref 0.02–0.08)
RETICS/RBC NFR AUTO: 0.3 % (ref 0.5–2)
SCHISTOCYTES BLD QL SMEAR: NORMAL
SICKLE CELLS BLD QL SMEAR: NORMAL
SODIUM SERPL-SCNC: 137 MMOL/L (ref 136–145)
TARGETS BLD QL SMEAR: NORMAL
WBC # BLD AUTO: 11.2 X10*3/UL (ref 4.4–11.3)

## 2025-04-07 PROCEDURE — 85610 PROTHROMBIN TIME: CPT | Performed by: NURSE PRACTITIONER

## 2025-04-07 PROCEDURE — 2500000001 HC RX 250 WO HCPCS SELF ADMINISTERED DRUGS (ALT 637 FOR MEDICARE OP): Performed by: NURSE PRACTITIONER

## 2025-04-07 PROCEDURE — 80053 COMPREHEN METABOLIC PANEL: CPT

## 2025-04-07 PROCEDURE — 99215 OFFICE O/P EST HI 40 MIN: CPT | Performed by: NURSE PRACTITIONER

## 2025-04-07 PROCEDURE — 1036F TOBACCO NON-USER: CPT | Performed by: NURSE PRACTITIONER

## 2025-04-07 PROCEDURE — 83615 LACTATE (LD) (LDH) ENZYME: CPT

## 2025-04-07 PROCEDURE — 2500000004 HC RX 250 GENERAL PHARMACY W/ HCPCS (ALT 636 FOR OP/ED): Mod: TB | Performed by: NURSE PRACTITIONER

## 2025-04-07 PROCEDURE — 85045 AUTOMATED RETICULOCYTE COUNT: CPT

## 2025-04-07 PROCEDURE — 85025 COMPLETE CBC W/AUTO DIFF WBC: CPT

## 2025-04-07 RX ORDER — ONDANSETRON 8 MG/1
8 TABLET, ORALLY DISINTEGRATING ORAL ONCE
Status: COMPLETED | OUTPATIENT
Start: 2025-04-07 | End: 2025-04-07

## 2025-04-07 RX ORDER — NALOXONE HYDROCHLORIDE 0.4 MG/ML
0.4 INJECTION, SOLUTION INTRAMUSCULAR; INTRAVENOUS; SUBCUTANEOUS
Status: DISCONTINUED | OUTPATIENT
Start: 2025-04-07 | End: 2025-04-07 | Stop reason: HOSPADM

## 2025-04-07 RX ORDER — OXYCODONE HYDROCHLORIDE 10 MG/1
10 TABLET ORAL EVERY 4 HOURS PRN
Qty: 75 TABLET | Refills: 0 | Status: SHIPPED | OUTPATIENT
Start: 2025-04-07

## 2025-04-07 RX ORDER — HYDROMORPHONE HYDROCHLORIDE 1 MG/ML
0.5 INJECTION, SOLUTION INTRAMUSCULAR; INTRAVENOUS; SUBCUTANEOUS
Status: COMPLETED | OUTPATIENT
Start: 2025-04-07 | End: 2025-04-07

## 2025-04-07 RX ORDER — DIPHENHYDRAMINE HCL 25 MG
25 CAPSULE ORAL ONCE
Status: COMPLETED | OUTPATIENT
Start: 2025-04-07 | End: 2025-04-07

## 2025-04-07 RX ORDER — CYCLOBENZAPRINE HCL 10 MG
10 TABLET ORAL ONCE
Status: COMPLETED | OUTPATIENT
Start: 2025-04-07 | End: 2025-04-07

## 2025-04-07 RX ADMIN — ONDANSETRON 8 MG: 8 TABLET, ORALLY DISINTEGRATING ORAL at 10:03

## 2025-04-07 RX ADMIN — DIPHENHYDRAMINE HYDROCHLORIDE 25 MG: 25 CAPSULE ORAL at 10:03

## 2025-04-07 RX ADMIN — HYDROMORPHONE HYDROCHLORIDE 0.5 MG: 1 INJECTION, SOLUTION INTRAMUSCULAR; INTRAVENOUS; SUBCUTANEOUS at 11:13

## 2025-04-07 RX ADMIN — HYDROMORPHONE HYDROCHLORIDE 0.5 MG: 1 INJECTION, SOLUTION INTRAMUSCULAR; INTRAVENOUS; SUBCUTANEOUS at 10:03

## 2025-04-07 RX ADMIN — HYDROMORPHONE HYDROCHLORIDE 0.5 MG: 1 INJECTION, SOLUTION INTRAMUSCULAR; INTRAVENOUS; SUBCUTANEOUS at 12:27

## 2025-04-07 RX ADMIN — CYCLOBENZAPRINE 10 MG: 10 TABLET, FILM COATED ORAL at 10:03

## 2025-04-07 ASSESSMENT — PAIN SCALES - GENERAL
PAINLEVEL_OUTOF10: 10-WORST PAIN EVER
PAINLEVEL_OUTOF10: 8
PAINLEVEL_OUTOF10: 10 - WORST POSSIBLE PAIN
PAINLEVEL_OUTOF10: 9

## 2025-04-07 NOTE — PROGRESS NOTES
Patient ID:  Senait Narvaez is a 55 y.o. female.  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 Took home oxycodone without relief. Also endorses left foot pain and reported swelling x 1 day, taking coumadin as rx'd. Says she can bear weight and swelling is not impacted by leg elevation.     Of note, is usually [facility] ineligible, discussed with outpatient team and ok for one time exception to be seen.     Denies any HA, dizziness/lightheadedness, fevers/chills, cough, congestion, rhinorrhea, sore throat, SOB, CP, palpitations, abdominal pain, n/v/d/c, melena, dysuria, urgency/frequency, hematuria, numbness/tingling, bruising/bleeding or rashes. Denies any sick contacts. ROS otherwise negative.     Allergies  Allergies   Allergen Reactions    Vancomycin Rash    Oxycontin [Oxycodone] Drowsiness        Medications  Current Outpatient Medications   Medication Instructions    albuterol 90 mcg/actuation inhaler 2 puffs, inhalation, Every 6 hours PRN    ascorbic acid (VITAMIN C) 500 mg, Daily    atropine 1 % ophthalmic solution 1 drop, Left Eye, Nightly, Please continue to administer to Left eye until seen at outpt clinic with ophthalmology this upcoming week    cyclobenzaprine (FLEXERIL) 10 mg, oral, 3 times daily PRN    enoxaparin (LOVENOX) 100 mg, subcutaneous, 2 times daily, Continue to check INR daily at home. Once INR is 2-3 STOP lovenox and continue warfarin daily     fluticasone (Flonase) 50 mcg/actuation nasal spray 2 sprays, Daily PRN    folic acid (FOLVITE) 1 mg, Daily    furosemide (LASIX) 20 mg, oral, Every other day    loratadine (CLARITIN) 10 mg, Daily PRN    metoprolol tartrate (LOPRESSOR) 25 mg, oral, 2 times daily    multivitamin tablet 1 tablet, Daily    naloxone (NARCAN) 4 mg, nasal, As needed, May repeat every 2-3 minutes if needed, alternating nostrils, until medical assistance becomes available.    ondansetron (ZOFRAN) 4 mg, oral, Every 8 hours PRN    oxyCODONE (ROXICODONE) 10 mg, oral, Every 4 hours PRN    oxygen (O2) gas therapy 1 each, inhalation, Continuous    traZODone (DESYREL) 50 mg, oral, Nightly PRN    triamcinolone (Kenalog) 0.1 % cream Topical, 2 times daily, Apply triamcinolone 0.1% cream to both the black plaques as well as the red areas across the trunk and thighs    warfarin (COUMADIN) 5 mg, oral, Every evening    white petrolatum (Aquaphor) 41 % ointment ointment 2 Applications, Topical, 2 times daily, Apply vaseline to all eroded/denuded areas. Mepilex transfer sheets may be used for areas of friction        Past Medical History:   Past Medical History:  No date: Asthma  No date: CHF (congestive heart failure)  No date: Chronic pain disorder  02/19/2020: Compression of vein      Comment:  Superior vena cava syndrome  12/10/2020: Hypotension, unspecified   Surgical History:    Past Surgical History:   Procedure Laterality Date    APPENDECTOMY  08/05/2013    Appendectomy    BIOPSY  9/15/2024    CHOLECYSTECTOMY  08/05/2013    Cholecystectomy    CT ANGIO NECK  10/19/2022    CT NECK ANGIO W AND WO IV CONTRAST 10/19/2022 [facility] CLINICAL LEGACY    CT GUIDED PERCUTANEOUS BIOPSY LYMPH NODE SUPERFICIAL  9/19/2019    CT GUIDED PERCUTANEOUS BIOPSY LYMPH NODE SUPERFICIAL 9/19/2019 [facility] INPATIENT LEGACY    IR CVC NONTUNNELED  10/26/2023    IR CVC NONTUNNELED 10/26/2023 [facility] ANGIO    IR CVC NONTUNNELED  12/7/2023    IR CVC NONTUNNELED 12/7/2023 Marcos Moser,  MD [facility] ANGIO    IR CVC TUNNELED  3/13/2015    IR CVC TUNNELED 3/13/2015 [facility] CLINICAL LEGACY    IR CVC TUNNELED  1/29/2016    IR CVC TUNNELED 1/29/2016 [facility] AIB LEGACY    IR CVC TUNNELED  1/17/2018    IR CVC TUNNELED 1/17/2018 [facility] AIB LEGACY    IR CVC TUNNELED  2/22/2018    IR CVC TUNNELED 2/22/2018 [facility] AIB LEGACY    IR CVC TUNNELED  3/22/2018    IR CVC TUNNELED 3/22/2018 [facility] CLINICAL LEGACY    IR CVC TUNNELED  4/18/2018    IR CVC TUNNELED 4/18/2018 [facility] AIB LEGACY    IR CVC TUNNELED  5/16/2018    IR CVC TUNNELED 5/16/2018 [facility] AIB LEGACY    IR CVC TUNNELED  6/12/2018    IR CVC TUNNELED 6/12/2018 [facility] AIB LEGACY    IR CVC TUNNELED  1/21/2020    IR CVC TUNNELED 1/21/2020 [facility] INPATIENT LEGACY    IR CVC TUNNELED  2/28/2020    IR CVC TUNNELED 2/28/2020 [facility] ANCILLARY LEGACY    IR CVC TUNNELED  4/10/2020    IR CVC TUNNELED 4/10/2020 [facility] AIB LEGACY    IR CVC TUNNELED  5/22/2020    IR CVC TUNNELED 5/22/2020 [facility] ANCILLARY LEGACY    IR CVC TUNNELED  6/19/2020    IR CVC TUNNELED 6/19/2020 [facility] AIB LEGACY    IR CVC TUNNELED  7/23/2020    IR CVC TUNNELED 7/23/2020 [facility] AIB LEGACY    IR CVC TUNNELED  9/4/2020    IR CVC TUNNELED 9/4/2020 [facility] AIB LEGACY    IR CVC TUNNELED  10/9/2020    IR CVC TUNNELED 10/9/2020 [facility] ANCILLARY LEGACY    IR CVC TUNNELED  11/13/2020    IR CVC TUNNELED 11/13/2020 [facility] AIB LEGACY    IR CVC TUNNELED  12/18/2020    IR CVC TUNNELED 12/18/2020 [facility] AIB LEGACY    IR CVC TUNNELED  1/22/2021    IR CVC TUNNELED 1/22/2021 [facility] AIB LEGACY    IR CVC TUNNELED  2/26/2021    IR CVC TUNNELED 2/26/2021 [facility] CLINICAL LEGACY    IR CVC TUNNELED  4/2/2021    IR CVC TUNNELED 4/2/2021 [facility] AIB LEGACY    IR CVC TUNNELED  5/7/2021    IR CVC TUNNELED 5/7/2021 [facility] ANCILLARY LEGACY    IR CVC TUNNELED  6/11/2021    IR CVC TUNNELED 6/11/2021 [facility] AIB LEGACY    IR CVC TUNNELED  7/16/2021    IR CVC TUNNELED 7/16/2021 [facility] ANCILLARY LEGACY    IR CVC TUNNELED  8/20/2021    IR CVC TUNNELED 8/20/2021 [facility] AIB LEGACY    IR CVC TUNNELED  9/24/2021     IR CVC TUNNELED 9/24/2021 [facility] AIB LEGACY    IR CVC TUNNELED  10/29/2021    IR CVC TUNNELED 10/29/2021 [facility] AIB LEGACY    IR CVC TUNNELED  12/3/2021    IR CVC TUNNELED 12/3/2021 [facility] AIB LEGACY    IR CVC TUNNELED  1/7/2022    IR CVC TUNNELED 1/7/2022 [facility] ANCILLARY LEGACY    IR CVC TUNNELED  2/23/2022    IR CVC TUNNELED 2/23/2022 [facility] CLINICAL LEGACY    IR CVC TUNNELED  3/25/2022    IR CVC TUNNELED 3/25/2022 [facility] ANCILLARY LEGACY    IR CVC TUNNELED  4/22/2022    IR CVC TUNNELED 4/22/2022 [facility] ANCILLARY LEGACY    IR CVC TUNNELED  6/3/2022    IR CVC TUNNELED 6/3/2022 [facility] ANCILLARY LEGACY    IR CVC TUNNELED  9/18/2017    IR CVC TUNNELED 9/18/2017 [facility] AIB LEGACY    IR CVC TUNNELED  10/30/2017    IR CVC TUNNELED 10/30/2017 [facility]IB LEGACY    IR CVC TUNNELED  12/19/2017    IR CVC TUNNELED 12/19/2017 [facility] [place]    IR CVC TUNNELED  1/6/2023    IR CVC TUNNELED 1/6/2023 [facility] OFFICE LEGACY    IR CVC TUNNELED  2/24/2023    IR CVC TUNNELED [facility] ANGIO    IR CVC TUNNELED  7/8/2022    IR CVC TUNNELED 7/8/2022 [facility] ANCILLARY LEGACY    IR CVC TUNNELED  8/12/2022    IR CVC TUNNELED 8/12/2022 [facility] CLINICAL LEGACY    IR CVC TUNNELED  9/16/2022    IR CVC TUNNELED 9/16/2022 [facility] ANCILLARY LEGACY    IR CVC TUNNELED  10/20/2022    IR CVC TUNNELED 10/20/2022 [facility] CLINICAL LEGACY    IR CVC TUNNELED  11/29/2022    IR CVC TUNNELED 11/29/2022 [facility] ANCILLARY LEGACY    IR CVC TUNNELED  3/31/2023    IR CVC TUNNELED [facility] ANGIO    IR CVC TUNNELED  6/9/2023    IR CVC TUNNELED 6/9/2023 [facility] ANGIO    IR CVC TUNNELED  7/20/2023    IR CVC TUNNELED 7/20/2023 [facility] ANGIO    IR CVC TUNNELED  8/16/2023    IR CVC TUNNELED 8/16/2023 [facility] ANGIO    IR CVC TUNNELED  9/21/2023    IR CVC TUNNELED 9/21/2023 [facility] ANGIO    MR HEAD ANGIO WO IV CONTRAST  8/16/2014    MR HEAD ANGIO WO IV CONTRAST 8/16/2014 [facility] ANCILLARY LEGACY    MR NECK ANGIO WO IV CONTRAST  8/16/2014    MR NECK ANGIO WO IV CONTRAST 8/16/2014 [facility] ANCILLARY LEGACY    OTHER SURGICAL HISTORY  05/13/2014    Fluid  Drained, Retina Inspected: Breaks Supported By Buckle    OTHER SURGICAL HISTORY  10/09/2014    Closed Treatment Of Fracture Of The Lateral Malleolus    OTHER SURGICAL HISTORY  2014    Salpingo-oophorectomy Right Side    US GUIDED BIOPSY LYMPH NODE SUPERFICIAL  2019    US GUIDED BIOPSY LYMPH NODE SUPERFICIAL 2019 [facility] INPATIENT LEGACY      Family History:    Family History   Problem Relation Name Age of Onset    Diabetes Father      Gout Father      Sickle cell trait Father      Seizures Sister      Diabetes Other      Uterine cancer Other      Other (congenital blindness) Cousin       Family Oncology History:    Cancer-related family history includes Uterine cancer in an other family member.  Social History:    Social History     Tobacco Use    Smoking status: Former     Current packs/day: 0.00     Types: Cigarettes     Quit date:      Years since quittin.2     Passive exposure: Past    Smokeless tobacco: Never   Substance Use Topics    Alcohol use: Not Currently    Drug use: Not Currently        Objective   Vitals: There were no vitals taken for this visit.  Weight: There were no vitals filed for this visit.    Physical Exam  Vitals reviewed.   Constitutional:       Appearance: Normal appearance.   HENT:      Head: Normocephalic and atraumatic.      Nose: Nose normal.      Mouth/Throat:      Mouth: Mucous membranes are moist.      Pharynx: Oropharynx is clear.   Eyes:      Extraocular Movements: Extraocular movements intact.      Pupils: Pupils are equal, round, and reactive to light.   Cardiovascular:      Rate and Rhythm: Normal rate and regular rhythm.      Pulses: Normal pulses.      Heart sounds: Normal heart sounds.   Pulmonary:      Effort: Pulmonary effort is normal.      Breath sounds: Normal breath sounds.   Abdominal:      General: Bowel sounds are normal.      Palpations: Abdomen is soft.   Musculoskeletal:         General: Normal range of motion.      Right lower leg: No edema.       Left lower leg: No edema.      Comments: No swelling noted bilaterally BLE   Skin:     General: Skin is warm.   Neurological:      General: No focal deficit present.      Mental Status: She is alert and oriented to person, place, and time. Mental status is at baseline.   Psychiatric:         Mood and Affect: Mood normal.         Behavior: Behavior normal.       Diagnostic Results     Labs  Results from last 7 days   Lab Units 03/31/25  0911   WBC AUTO x10*3/uL 14.2*   HEMOGLOBIN g/dL 10.9*   HEMATOCRIT % 33.4*   PLATELETS AUTO x10*3/uL 360   NEUTROS ABS x10*3/uL 11.05*   LYMPHS ABS AUTO x10*3/uL 2.04   MONOS ABS AUTO x10*3/uL 0.79   EOS ABS AUTO x10*3/uL 0.23   NEUTROS PCT AUTO % 77.7   LYMPHS PCT AUTO % 14.4   MONOS PCT AUTO % 5.6   EOS PCT AUTO % 1.6      Results from last 7 days   Lab Units 03/31/25  0911   GLUCOSE mg/dL 120*   SODIUM mmol/L 137   POTASSIUM mmol/L 3.9   CHLORIDE mmol/L 101   CO2 mmol/L 27   BUN mg/dL 13   CREATININE mg/dL 1.18*   EGFR mL/min/1.73m*2 54*   CALCIUM mg/dL 9.2   ALBUMIN g/dL 3.7   PROTEIN TOTAL g/dL 7.5     Results from last 7 days   Lab Units 03/31/25  0911   BILIRUBIN TOTAL mg/dL 1.4*   ALK PHOS U/L 119*   ALT U/L 5*   AST U/L 11         Results from last 7 days   Lab Units 03/31/25  0911   RETIC CT PCT % 8.0*   RETIC CT ABS x10*6/uL 0.348*   IMMATURE RETIC FRACTION % 17.5*   RETIC HGB pg 23*     Results from last 7 days   Lab Units 03/31/25  0911   LD U/L 167   FERRITIN ng/mL 17         Lab Results   Component Value Date    HGB 10.9 (L) 03/31/2025    HGB 8.9 (L) 03/09/2025    HGB 8.9 (L) 03/08/2025     03/31/2025     03/09/2025     03/08/2025     03/31/2025     03/09/2025     03/08/2025       Images  === 03/02/25 ===    XR CHEST 1 VIEW    - Impression -  1.  Similar hazy opacity of the left lung base compared to last chest  x-ray on 11/03/2024, again favored to represent overlying epicardial  fat pad, with a possible atelectatic  component.  2. Otherwise no focal consolidation, pleural effusion, or  pneumothorax.    I personally reviewed the image(s)/study and resident interpretation.  I agree with the findings as stated by resident Diomedes Coleman.  Data analyzed and images interpreted at [facility], [place], [place].    MACRO:  None    Signed by: Sharda Kate 3/2/2025 6:15 PM  Dictation workstation:   [id number]   === 03/02/25 ===    CT HEAD WO IV CONTRAST    - Impression -  No acute intracranial abnormality.    Signed by: Marycarmen Finney 3/2/2025 5:12 PM  Dictation workstation:   [id number]     Transthoracic Echo (TTE) Complete    Result Date: 9/11/2024   [facility], [address] [place] [address]                Tel [phone] and Fax [phone] TRANSTHORACIC ECHOCARDIOGRAM REPORT  Patient Name:      KIRSTY Saenz Physician:    [id number] Fatou Gant MD Study Date:        9/11/2024            Ordering Provider:    [id number] KWAKU IVERSON MRN/PID:           [id number]             Fellow: Accession#:        [id number]         Nurse: Date of Birth/Age: 1970 / 54 years Sonographer:          Marino Chen RDCS, RVT Gender:            F                    Additional Staff: Height:            165.10 cm            Admit Date:           9/10/2024 Weight:            109.77 kg            Admission Status:     Inpatient - STAT BSA / BMI:         2.15 m2 / 40.27      Encounter#:           [id number]                    kg/m2 Blood Pressure:    98/49 mmHg           Department Location:  [address] Study Type:    TRANSTHORACIC ECHO (TTE) COMPLETE Diagnosis/ICD: Non ST elevation (NSTEMI) myocardial infarction-I21.4 Indication:    elevated troponin, ST depression CPT Code:      Echo  Complete w Full Doppler-[id number] Patient History: Pertinent History: NSTEMI, cardiom,yopathy, DVT, HLD, HTN, tachycardia. Study Detail: The following Echo studies were performed: 2D, M-Mode, Doppler and               color flow. Technically challenging study due to body habitus.               Definity used as a contrast agent for endocardial border               definition. Total contrast used for this procedure was 2 mL via IV               push.  PHYSICIAN INTERPRETATION: Left Ventricle: Left ventricular ejection fraction is normal, by visual estimate at 60-65%. There are no regional left ventricular wall motion abnormalities. The left ventricular cavity size is decreased. Left ventricular diastolic filling was indeterminate. Technically difficult despite the use of echocontrast, but overall normal LV systolic function without obvoius regional wall motion abnormalities. Left Atrium: The left atrium was not well visualized. Right Ventricle: The right ventricle was not well visualized. There is reduced right ventricular systolic function. Echocontrast views of the RV suggest dilated RV with reduced systolic function. Cannot exclude possible McConel's sign. Right Atrium: The right atrium was not well visualized. Aortic Valve: The aortic valve was not well visualized. The aortic valve dimensionless index is 0.81. There is no evidence of aortic valve regurgitation. The peak instantaneous gradient of the aortic valve is 11.0 mmHg. The mean gradient of the aortic valve is 6.0 mmHg. Mitral Valve: The mitral valve is normal in structure. There is trace mitral valve regurgitation. Tricuspid Valve: The tricuspid valve was not well visualized. There is trace tricuspid regurgitation. The right ventricular systolic pressure is unable to be estimated. Pulmonic Valve: The pulmonic valve is not well visualized. The pulmonic valve regurgitation was not well visualized. Pericardium: There is a trivial pericardial effusion. Aorta: The  aortic root is normal. Systemic Veins: The inferior vena cava appears to be of normal size, IVC inspiratory collapse greater than 50%. In comparison to the previous echocardiogram(s): Note that the prior exam from 1/23/2024 the prior study demonstrated modeately enlarged RV with reduced RV systolic function. The LV systolic funciton was normal on the prior study without regional wall motion abnormalities. That study was also technically difficult.  CONCLUSIONS:  1. Poorly visualized anatomical structures due to suboptimal image quality.  2. Left ventricular ejection fraction is normal, by visual estimate at 60-65%.  3. Left ventricular diastolic filling was indeterminate.  4. Left ventricular cavity size is decreased.  5. Technically difficult despite the use of echocontrast, but overall normal LV systolic function without obvoius regional wall motion abnormalities.  6. There is reduced right ventricular systolic function.  7. Echocontrast views of the RV suggest dilated RV with reduced systolic function. Cannot exclude possible McConel's sign.  8. Note that the prior exam from 1/23/2024 the prior study demonstrated modeately enlarged RV with reduced RV systolic function. The LV systolic funciton was normal on the prior study without regional wall motion abnormalities. That study was also technically difficult. QUANTITATIVE DATA SUMMARY:  2D MEASUREMENTS:          Normal Ranges: Ao Root d:       2.90 cm  (2.0-3.7cm) IVSd:            1.00 cm  (0.6-1.1cm) LVPWd:           0.90 cm  (0.6-1.1cm) LVIDd:           3.70 cm  (3.9-5.9cm) LVIDs:           2.40 cm LV Mass Index:   49 g/m2 LVEDV Index:     32 ml/m2 LV % FS          35.1 %  LA VOLUME:                    Normal Ranges: LA Vol A4C:        24.5 ml    (22+/-6mL/m2) LA Vol A2C:        18.6 ml LA Vol BP:         21.6 ml LA Vol Index A4C:  11.4ml/m2 LA Vol Index A2C:  8.6 ml/m2 LA Vol Index BP:   10.1 ml/m2 LA Area A4C:       11.4 cm2 LA Area A2C:       9.8 cm2 LA Major  Axis A4C: 4.5 cm LA Major Axis A2C: 4.4 cm  RA VOLUME BY A/L METHOD:          Normal Ranges: RA Area A4C:             10.8 cm2  AORTA MEASUREMENTS:         Normal Ranges: Ao Sinus, d:        2.90 cm (2.1-3.5cm) Asc Ao, d:          2.70 cm (2.1-3.4cm)  LV SYSTOLIC FUNCTION BY 2D PLANIMETRY (MOD):                      Normal Ranges: EF-A4C View:    78 % (>=55%) EF-A2C View:    66 % EF-Biplane:     73 % EF-Visual:      63 % LV EF Reported: 63 %  LV DIASTOLIC FUNCTION:           Normal Ranges: MV Peak E:             0.71 m/s  (0.7-1.2 m/s) MV Peak A:             0.69 m/s  (0.42-0.7 m/s) E/A Ratio:             1.02      (1.0-2.2) MV e'                  0.073 m/s (>8.0) MV lateral e'          0.07 m/s MV medial e'           0.08 m/s E/e' Ratio:            9.63      (<8.0)  AORTIC VALVE:                      Normal Ranges: AoV Vmax:                1.66 m/s  (<=1.7m/s) AoV Peak P.0 mmHg (<20mmHg) AoV Mean P.0 mmHg  (1.7-11.5mmHg) LVOT Max Jeremy:            1.44 m/s  (<=1.1m/s) AoV VTI:                 19.00 cm  (18-25cm) LVOT VTI:                15.40 cm LVOT Diameter:           2.00 cm   (1.8-2.4cm) AoV Area, VTI:           2.55 cm2  (2.5-5.5cm2) AoV Area,Vmax:           2.73 cm2  (2.5-4.5cm2) AoV Dimensionless Index: 0.81  RIGHT VENTRICLE: RV Major 7.6 cm TAPSE:   13.4 mm RV s'    0.09 m/s  TRICUSPID VALVE/RVSP:         Normal Ranges: IVC Diam:             1.40 cm  PULMONIC VALVE:          Normal Ranges: PV Accel Time:  74 msec  (>120ms) PV Max Jeremy:     1.1 m/s  (0.6-0.9m/s) PV Max P.6 mmHg PV Mean P.0 mmHg PV VTI:         15.80 cm  [id number] Fatou Gant MD Electronically signed on 2024 at 10:20:31 AM  ** Final **     Transthoracic Echo (TTE) Limited    Result Date: 2024   [facility], [address] [place] [address]                Tel [phone] and Fax [phone] TRANSTHORACIC ECHOCARDIOGRAM REPORT  Patient Name:      KIRSTY MARSHALL           Reading Physician: [id number] Fatou Gant MD Study Date:        9/11/2024            Ordering Provider: [id number] KENDRICK LEMON MRN/PID:           [id number]             Fellow:            [id number] Kendrick Lemon MD Accession#:        [id number]         Nurse: Date of Birth/Age: 1970 / 54 years Sonographer:       Marco Lemon MD Gender:            F                    Additional Staff: BSA / BMI:         m2 / kg/m2           Encounter#:        [id number] Study Type:    TRANSTHORACIC ECHO (TTE) LIMITED Diagnosis/ICD: Non ST elevation (NSTEMI) myocardial infarction-I21.4 Indication:    NSTEMI CPT Code:      Echo Limited-34693; Doppler Limited-08437; Color Doppler-18630  Study Detail: The following Echo studies were performed: 2D, M-Mode, Doppler and               color flow. Definity used as a contrast agent for endocardial               border definition.  PHYSICIAN INTERPRETATION: Left Ventricle: The left ventricular systolic function is normal, with a visually estimated ejection fraction of 65-70%. There are no regional left ventricular wall motion abnormalities. The left ventricular cavity size is normal. Left ventricular diastolic filling was not assessed. Technically difficult study, however appears to have grossly normal LV systolic function without obvious regional wall motion abnormalities though not all segments were visualized. Left Atrium: The left atrium is normal in size. Left atrial appendage was not assessed. Right Ventricle: The right ventricle was not well visualized. Right ventricular systolic function not assessed. Right Atrium: The right atrium was not well visualized. Aortic Valve: The aortic valve was not well visualized. The aortic valve dimensionless index is 1.22. There is trace aortic valve  regurgitation. The peak instantaneous gradient of the aortic valve is 11.4 mmHg. The mean gradient of the aortic valve is 5.0 mmHg. Mitral Valve: The mitral valve is normal in structure. There is no evidence of mitral valve regurgitation. Tricuspid Valve: The tricuspid valve was not well visualized. There is trace tricuspid regurgitation. The right ventricular systolic pressure is unable to be estimated. Pulmonic Valve: The pulmonic valve was not assessed. Pulmonic valve regurgitation was not assessed. Pericardium: There is no pericardial effusion noted. Aorta: The aortic root was not well visualized. Systemic Veins: The inferior vena cava appears to be of normal size, absent inspiratory collapse of the IVC. In comparison to the previous echocardiogram(s): Compared withthe prior exam from 1/24/2024 this study is only a limited and technically difficult exam, but the prior study showed normal LV systolic function as well ith LVEF 60-65% and no significant valvular pathology at that time. Nota that the RV function was reduced previously and not assessed today.  CONCLUSIONS:  1. Poorly visualized anatomical structures due to suboptimal image quality.  2. The left ventricular systolic function is normal, with a visually estimated ejection fraction of 65-70%.  3. Technically difficult study, however appears to have grossly normal LV systolic function without obvious regional wall motion abnormalities though not all segments were visualized.  4. Limited fellow bedside exam.  5. Compared withthe prior exam from 1/24/2024 this study is only a limited and technically difficult exam, but the prior study showed normal LV systolic function as well ith LVEF 60-65% and no significant valvular pathology at that time. Nota that the RV function was reduced previously and not assessed today. QUANTITATIVE DATA SUMMARY:  2D MEASUREMENTS:         Normal Ranges: LAs:             3.40 cm (2.7-4.0cm) IVSd:            1.15 cm (0.6-1.1cm)  LVPWd:           1.15 cm (0.6-1.1cm) LVIDd:           4.15 cm (3.9-5.9cm) LVIDs:           2.45 cm LV % FS          41.0 %  LV SYSTOLIC FUNCTION BY 2D PLANIMETRY (MOD):                      Normal Ranges: EF-A4C View:    71 % (>=55%) EF-Visual:      68 % LV EF Reported: 68 %  LV DIASTOLIC FUNCTION:          Normal Ranges: MV Peak E:             0.82 m/s (0.7-1.2 m/s) MV Peak A:             0.54 m/s (0.42-0.7 m/s) E/A Ratio:             1.54     (1.0-2.2) MV medial e'           0.13 m/s  MITRAL VALVE:          Normal Ranges: MV DT:        187 msec (150-240msec)  AORTIC VALVE:                      Normal Ranges: AoV Vmax:                1.69 m/s  (<=1.7m/s) AoV Peak P.4 mmHg (<20mmHg) AoV Mean P.0 mmHg  (1.7-11.5mmHg) LVOT Max Jeremy:            1.39 m/s  (<=1.1m/s) AoV VTI:                 20.30 cm  (18-25cm) LVOT VTI:                24.70 cm LVOT Diameter:           2.20 cm   (1.8-2.4cm) AoV Area, VTI:           4.42 cm2  (2.5-5.5cm2) AoV Area,Vmax:           2.99 cm2  (2.5-4.5cm2) AoV Dimensionless Index: 1.22  RIGHT VENTRICLE: TAPSE: 19.1 mm  TRICUSPID VALVE/RVSP:         Normal Ranges: IVC Diam:             1.90 cm  [id number] Fatou Gant MD Electronically signed on 2024 at 8:23:31 AM  ** Final **         Assessment/Plan   Senait Narvaez 54 yo female with 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      ACC Course  - VSS  - Hemolysis labs near baseline, no indication of acute vaso-occlusive crisis or indication for blood  transfusion   - Flexeril 10mg PO, given for AVN  - SQ/IV Dilaudid  0.5 mg every 60 mins x 3 doses, given for sickle cell related pain  - Zofran 8mg PO once for opioid induced nausea/vomiting  - Benadryl 25mg PO once for opioid induced pruritus   - no foot or leg swelling noted on exam, pt educated on elevation of LE if re- occurs and s/s to report to ED with  - INR 1.3, pt instructed to call Warfarin monitoring service to discuss result. Pt agreeable and to call today    Disposition  - Patient discharged home with no further needs following ACC Course  - Return to clinic/ED instructions given    Vero Razo, APRN-CNP

## 2025-04-07 NOTE — TELEPHONE ENCOUNTER
Pt requesting ACC appt today.   She is having pain throughout entire body since yesterday. Pain medication not helping.   Secure chat sent to ACC team

## 2025-04-07 NOTE — TELEPHONE ENCOUNTER
Refill request received for Oxycodone 10mg.  Preferred pharmacy is [facility] at [address] [place].  Message sent to Sickle Cell team.

## 2025-04-07 NOTE — TELEPHONE ENCOUNTER
Patient having pain all over x 2 days, requesting to be seen. Discussed with Dr. Whaley, exception made for one time ACC apt. Apt 4/7 @ 930, does not need a ride.

## 2025-04-16 ENCOUNTER — OFFICE VISIT (OUTPATIENT)
Dept: HEMATOLOGY/ONCOLOGY | Facility: HOSPITAL | Age: 55
End: 2025-04-16
Payer: COMMERCIAL

## 2025-04-16 ENCOUNTER — LAB (OUTPATIENT)
Dept: LAB | Facility: HOSPITAL | Age: 55
End: 2025-04-16
Payer: COMMERCIAL

## 2025-04-16 VITALS
OXYGEN SATURATION: 95 % | RESPIRATION RATE: 18 BRPM | DIASTOLIC BLOOD PRESSURE: 58 MMHG | TEMPERATURE: 96.4 F | SYSTOLIC BLOOD PRESSURE: 113 MMHG | BODY MASS INDEX: 36.32 KG/M2 | WEIGHT: 218.26 LBS | HEART RATE: 81 BPM

## 2025-04-16 DIAGNOSIS — G89.4 CHRONIC PAIN SYNDROME: ICD-10-CM

## 2025-04-16 DIAGNOSIS — D57.219 SICKLE-CELL-HEMOGLOBIN C DISEASE WITH CRISIS (MULTI): Primary | ICD-10-CM

## 2025-04-16 DIAGNOSIS — D57.219 SICKLE-CELL-HEMOGLOBIN C DISEASE WITH CRISIS (MULTI): ICD-10-CM

## 2025-04-16 LAB
ABO GROUP (TYPE) IN BLOOD: NORMAL
ALBUMIN SERPL BCP-MCNC: 3.7 G/DL (ref 3.4–5)
ALP SERPL-CCNC: 131 U/L (ref 33–110)
ALT SERPL W P-5'-P-CCNC: 9 U/L (ref 7–45)
ANION GAP SERPL CALC-SCNC: 8 MMOL/L (ref 10–20)
ANTIBODY SCREEN: NORMAL
AST SERPL W P-5'-P-CCNC: 16 U/L (ref 9–39)
BASOPHILS # BLD AUTO: 0.02 X10*3/UL (ref 0–0.1)
BASOPHILS NFR BLD AUTO: 0.2 %
BILIRUB SERPL-MCNC: 1.7 MG/DL (ref 0–1.2)
BUN SERPL-MCNC: 12 MG/DL (ref 6–23)
CA-I BLD-SCNC: 1.13 MMOL/L (ref 1.1–1.33)
CALCIUM SERPL-MCNC: 9 MG/DL (ref 8.6–10.3)
CHLORIDE SERPL-SCNC: 99 MMOL/L (ref 98–107)
CO2 SERPL-SCNC: 32 MMOL/L (ref 21–32)
CREAT SERPL-MCNC: 0.95 MG/DL (ref 0.5–1.05)
EGFRCR SERPLBLD CKD-EPI 2021: 70 ML/MIN/1.73M*2
EOSINOPHIL # BLD AUTO: 0.21 X10*3/UL (ref 0–0.7)
EOSINOPHIL NFR BLD AUTO: 2 %
ERYTHROCYTE [DISTWIDTH] IN BLOOD BY AUTOMATED COUNT: 27.2 % (ref 11.5–14.5)
GIANT PLATELETS BLD QL SMEAR: NORMAL
GLUCOSE SERPL-MCNC: 103 MG/DL (ref 74–99)
HCT VFR BLD AUTO: 31.8 % (ref 36–46)
HGB BLD-MCNC: 10.4 G/DL (ref 12–16)
HGB RETIC QN: 23 PG (ref 28–38)
IMM GRANULOCYTES # BLD AUTO: 0.04 X10*3/UL (ref 0–0.7)
IMM GRANULOCYTES NFR BLD AUTO: 0.4 % (ref 0–0.9)
IMMATURE RETIC FRACTION: 54.8 %
INR PPP: 2.4 (ref 0.9–1.1)
LDH SERPL L TO P-CCNC: 179 U/L (ref 84–246)
LYMPHOCYTES # BLD AUTO: 2.28 X10*3/UL (ref 1.2–4.8)
LYMPHOCYTES NFR BLD AUTO: 21.3 %
MCH RBC QN AUTO: 23.6 PG (ref 26–34)
MCHC RBC AUTO-ENTMCNC: 32.7 G/DL (ref 32–36)
MCV RBC AUTO: 72 FL (ref 80–100)
MONOCYTES # BLD AUTO: 0.59 X10*3/UL (ref 0.1–1)
MONOCYTES NFR BLD AUTO: 5.5 %
NEUTROPHILS # BLD AUTO: 7.57 X10*3/UL (ref 1.2–7.7)
NEUTROPHILS NFR BLD AUTO: 70.6 %
NRBC BLD-RTO: 0.7 /100 WBCS (ref 0–0)
PLATELET # BLD AUTO: 357 X10*3/UL (ref 150–450)
PLATELET CLUMP BLD QL SMEAR: PRESENT
POLYCHROMASIA BLD QL SMEAR: NORMAL
POTASSIUM SERPL-SCNC: 4.3 MMOL/L (ref 3.5–5.3)
PROT SERPL-MCNC: 7.3 G/DL (ref 6.4–8.2)
PROTHROMBIN TIME: 26.3 SECONDS (ref 9.8–12.4)
RBC # BLD AUTO: 4.41 X10*6/UL (ref 4–5.2)
RBC MORPH BLD: NORMAL
RETICS #: 0.02 X10*6/UL (ref 0.02–0.08)
RETICS/RBC NFR AUTO: 0.5 % (ref 0.5–2)
RH FACTOR (ANTIGEN D): NORMAL
SCHISTOCYTES BLD QL SMEAR: NORMAL
SICKLE CELLS BLD QL SMEAR: NORMAL
SODIUM SERPL-SCNC: 135 MMOL/L (ref 136–145)
TARGETS BLD QL SMEAR: NORMAL
WBC # BLD AUTO: 10.7 X10*3/UL (ref 4.4–11.3)

## 2025-04-16 PROCEDURE — 80053 COMPREHEN METABOLIC PANEL: CPT

## 2025-04-16 PROCEDURE — 86901 BLOOD TYPING SEROLOGIC RH(D): CPT

## 2025-04-16 PROCEDURE — 82330 ASSAY OF CALCIUM: CPT

## 2025-04-16 PROCEDURE — G2211 COMPLEX E/M VISIT ADD ON: HCPCS | Performed by: PEDIATRICS

## 2025-04-16 PROCEDURE — 99215 OFFICE O/P EST HI 40 MIN: CPT | Performed by: PEDIATRICS

## 2025-04-16 PROCEDURE — 85025 COMPLETE CBC W/AUTO DIFF WBC: CPT

## 2025-04-16 PROCEDURE — 85610 PROTHROMBIN TIME: CPT

## 2025-04-16 PROCEDURE — 85045 AUTOMATED RETICULOCYTE COUNT: CPT

## 2025-04-16 PROCEDURE — 83021 HEMOGLOBIN CHROMOTOGRAPHY: CPT

## 2025-04-16 PROCEDURE — 86922 COMPATIBILITY TEST ANTIGLOB: CPT

## 2025-04-16 PROCEDURE — 83615 LACTATE (LD) (LDH) ENZYME: CPT

## 2025-04-16 ASSESSMENT — PAIN SCALES - GENERAL: PAINLEVEL_OUTOF10: 7

## 2025-04-17 DIAGNOSIS — D57.1 SICKLE CELL DISEASE WITHOUT CRISIS (MULTI): Primary | ICD-10-CM

## 2025-04-17 LAB
HEMOGLOBIN A2: 3.6 % (ref 2–3.5)
HEMOGLOBIN A: 33 % (ref 95.8–98)
HEMOGLOBIN C: 31 %
HEMOGLOBIN F: 0.4 % (ref 0–2)
HEMOGLOBIN IDENTIFICATION INTERPRETATION: ABNORMAL
HEMOGLOBIN S: 32 %
PATH REVIEW-HGB IDENTIFICATION: ABNORMAL

## 2025-04-18 ENCOUNTER — HOSPITAL ENCOUNTER (OUTPATIENT)
Dept: RADIOLOGY | Facility: HOSPITAL | Age: 55
Discharge: HOME | End: 2025-04-18
Payer: COMMERCIAL

## 2025-04-18 ENCOUNTER — HOSPITAL ENCOUNTER (OUTPATIENT)
Dept: OTHER | Facility: HOSPITAL | Age: 55
Discharge: HOME | End: 2025-04-18
Payer: COMMERCIAL

## 2025-04-18 ENCOUNTER — ANTICOAGULATION - WARFARIN VISIT (OUTPATIENT)
Dept: CARDIOLOGY | Facility: CLINIC | Age: 55
End: 2025-04-18

## 2025-04-18 VITALS
TEMPERATURE: 97.5 F | SYSTOLIC BLOOD PRESSURE: 110 MMHG | RESPIRATION RATE: 20 BRPM | HEART RATE: 73 BPM | OXYGEN SATURATION: 97 % | DIASTOLIC BLOOD PRESSURE: 58 MMHG

## 2025-04-18 VITALS
RESPIRATION RATE: 16 BRPM | DIASTOLIC BLOOD PRESSURE: 82 MMHG | OXYGEN SATURATION: 97 % | TEMPERATURE: 97.5 F | SYSTOLIC BLOOD PRESSURE: 128 MMHG | HEART RATE: 79 BPM

## 2025-04-18 DIAGNOSIS — I26.99 RECURRENT PULMONARY EMBOLISM (MULTI): Primary | ICD-10-CM

## 2025-04-18 DIAGNOSIS — I82.4Z9 DEEP VEIN THROMBOSIS (DVT) OF DISTAL VEIN OF LOWER EXTREMITY, UNSPECIFIED CHRONICITY, UNSPECIFIED LATERALITY: ICD-10-CM

## 2025-04-18 DIAGNOSIS — G89.4 CHRONIC PAIN SYNDROME: ICD-10-CM

## 2025-04-18 DIAGNOSIS — I82.210 THROMBOSIS OF SUPERIOR VENA CAVA (MULTI): ICD-10-CM

## 2025-04-18 DIAGNOSIS — D57.219 SICKLE-CELL-HEMOGLOBIN C DISEASE WITH CRISIS (MULTI): ICD-10-CM

## 2025-04-18 LAB
BLOOD EXPIRATION DATE: NORMAL
CA-I BLD-SCNC: 0.99 MMOL/L (ref 1.1–1.33)
DISPENSE STATUS: NORMAL
ERYTHROCYTE [DISTWIDTH] IN BLOOD BY AUTOMATED COUNT: 22.6 % (ref 11.5–14.5)
HCT VFR BLD AUTO: 28.7 % (ref 36–46)
HGB BLD-MCNC: 9.7 G/DL (ref 12–16)
MCH RBC QN AUTO: 26.6 PG (ref 26–34)
MCHC RBC AUTO-ENTMCNC: 33.8 G/DL (ref 32–36)
MCV RBC AUTO: 79 FL (ref 80–100)
NRBC BLD-RTO: 7.7 /100 WBCS (ref 0–0)
PLATELET # BLD AUTO: 168 X10*3/UL (ref 150–450)
PRODUCT BLOOD TYPE: 1700
PRODUCT BLOOD TYPE: 7300
PRODUCT CODE: NORMAL
RBC # BLD AUTO: 3.65 X10*6/UL (ref 4–5.2)
UNIT ABO: NORMAL
UNIT NUMBER: NORMAL
UNIT RH: NORMAL
UNIT VOLUME: 277
UNIT VOLUME: 350
WBC # BLD AUTO: 8.6 X10*3/UL (ref 4.4–11.3)
XM INTEP: NORMAL

## 2025-04-18 PROCEDURE — 85660 RBC SICKLE CELL TEST: CPT

## 2025-04-18 PROCEDURE — 77001 FLUOROGUIDE FOR VEIN DEVICE: CPT | Performed by: RADIOLOGY

## 2025-04-18 PROCEDURE — C1894 INTRO/SHEATH, NON-LASER: HCPCS

## 2025-04-18 PROCEDURE — 2500000001 HC RX 250 WO HCPCS SELF ADMINISTERED DRUGS (ALT 637 FOR MEDICARE OP)

## 2025-04-18 PROCEDURE — 2500000004 HC RX 250 GENERAL PHARMACY W/ HCPCS (ALT 636 FOR OP/ED)

## 2025-04-18 PROCEDURE — 2720000007 HC OR 272 NO HCPCS

## 2025-04-18 PROCEDURE — C1752 CATH,HEMODIALYSIS,SHORT-TERM: HCPCS

## 2025-04-18 PROCEDURE — P9040 RBC LEUKOREDUCED IRRADIATED: HCPCS

## 2025-04-18 PROCEDURE — 2780000003 HC OR 278 NO HCPCS

## 2025-04-18 PROCEDURE — C1769 GUIDE WIRE: HCPCS

## 2025-04-18 PROCEDURE — 99153 MOD SED SAME PHYS/QHP EA: CPT

## 2025-04-18 PROCEDURE — 36512 APHERESIS RBC: CPT

## 2025-04-18 PROCEDURE — 76937 US GUIDE VASCULAR ACCESS: CPT | Performed by: RADIOLOGY

## 2025-04-18 PROCEDURE — 36556 INSERT NON-TUNNEL CV CATH: CPT | Performed by: RADIOLOGY

## 2025-04-18 PROCEDURE — 86902 BLOOD TYPE ANTIGEN DONOR EA: CPT

## 2025-04-18 PROCEDURE — 99152 MOD SED SAME PHYS/QHP 5/>YRS: CPT

## 2025-04-18 PROCEDURE — 7100000010 HC PHASE TWO TIME - EACH INCREMENTAL 1 MINUTE

## 2025-04-18 PROCEDURE — 2500000004 HC RX 250 GENERAL PHARMACY W/ HCPCS (ALT 636 FOR OP/ED): Mod: JZ | Performed by: RADIOLOGY

## 2025-04-18 PROCEDURE — 7100000009 HC PHASE TWO TIME - INITIAL BASE CHARGE

## 2025-04-18 RX ORDER — FENTANYL CITRATE 50 UG/ML
INJECTION, SOLUTION INTRAMUSCULAR; INTRAVENOUS
Status: COMPLETED | OUTPATIENT
Start: 2025-04-18 | End: 2025-04-18

## 2025-04-18 RX ORDER — ACETAMINOPHEN 325 MG/1
TABLET ORAL
Status: COMPLETED
Start: 2025-04-18 | End: 2025-04-18

## 2025-04-18 RX ORDER — ACETAMINOPHEN 325 MG/1
650 TABLET ORAL ONCE
Status: COMPLETED | OUTPATIENT
Start: 2025-04-18 | End: 2025-04-18

## 2025-04-18 RX ORDER — CALCIUM CARBONATE 200(500)MG
1500 TABLET,CHEWABLE ORAL EVERY 5 MIN PRN
Status: DISCONTINUED | OUTPATIENT
Start: 2025-04-18 | End: 2025-04-18 | Stop reason: HOSPADM

## 2025-04-18 RX ORDER — SODIUM CHLORIDE 0.9 % (FLUSH) 0.9 %
10 SYRINGE (ML) INJECTION ONCE
Status: COMPLETED | OUTPATIENT
Start: 2025-04-18 | End: 2025-04-18

## 2025-04-18 RX ORDER — DIPHENHYDRAMINE HCL 25 MG
CAPSULE ORAL
Status: COMPLETED
Start: 2025-04-18 | End: 2025-04-18

## 2025-04-18 RX ORDER — DIPHENHYDRAMINE HYDROCHLORIDE 50 MG/ML
25 INJECTION, SOLUTION INTRAMUSCULAR; INTRAVENOUS EVERY 5 MIN PRN
Status: DISCONTINUED | OUTPATIENT
Start: 2025-04-18 | End: 2025-04-18 | Stop reason: HOSPADM

## 2025-04-18 RX ORDER — DIPHENHYDRAMINE HCL 25 MG
25 CAPSULE ORAL ONCE
Status: COMPLETED | OUTPATIENT
Start: 2025-04-18 | End: 2025-04-18

## 2025-04-18 RX ORDER — MIDAZOLAM HYDROCHLORIDE 1 MG/ML
INJECTION INTRAMUSCULAR; INTRAVENOUS
Status: COMPLETED | OUTPATIENT
Start: 2025-04-18 | End: 2025-04-18

## 2025-04-18 RX ADMIN — Medication 10 ML: at 13:34

## 2025-04-18 RX ADMIN — DIPHENHYDRAMINE HYDROCHLORIDE 25 MG: 25 CAPSULE ORAL at 11:27

## 2025-04-18 RX ADMIN — CALCIUM GLUCONATE 25 ML/HR: 20 INJECTION, SOLUTION INTRAVENOUS at 11:55

## 2025-04-18 RX ADMIN — ACETAMINOPHEN 650 MG: 325 TABLET ORAL at 11:27

## 2025-04-18 RX ADMIN — FENTANYL CITRATE 50 MCG: 50 INJECTION, SOLUTION INTRAMUSCULAR; INTRAVENOUS at 10:09

## 2025-04-18 RX ADMIN — Medication 10 ML: at 13:33

## 2025-04-18 RX ADMIN — MIDAZOLAM HYDROCHLORIDE 0.5 MG: 2 INJECTION, SOLUTION INTRAMUSCULAR; INTRAVENOUS at 10:09

## 2025-04-18 RX ADMIN — Medication 25 MG: at 11:27

## 2025-04-18 RX ADMIN — ACETAMINOPHEN 650 MG: 325 TABLET, FILM COATED ORAL at 11:27

## 2025-04-18 ASSESSMENT — PAIN SCALES - GENERAL
PAINLEVEL_OUTOF10: 0 - NO PAIN
PAINLEVEL_OUTOF10: 6
PAINLEVEL_OUTOF10: 0 - NO PAIN

## 2025-04-18 ASSESSMENT — PAIN - FUNCTIONAL ASSESSMENT
PAIN_FUNCTIONAL_ASSESSMENT: 0-10

## 2025-04-18 ASSESSMENT — ENCOUNTER SYMPTOMS
OCCASIONAL FEELINGS OF UNSTEADINESS: 0
DEPRESSION: 0
LOSS OF SENSATION IN FEET: 0

## 2025-04-18 ASSESSMENT — PAIN DESCRIPTION - DESCRIPTORS: DESCRIPTORS: ACHING;THROBBING

## 2025-04-18 NOTE — Clinical Note
13F R femoral apheresis catheter placed; wire removed, caps placed. Pt received 0.5mg versed and 50mcg fentanyl for sedation; tolerated well. VSS throughout procedure. Dressing is clean, dry, and intact. Pt to RPCU; report to RPCU RN.

## 2025-04-18 NOTE — POST-PROCEDURE NOTE
This is a 55 y.o. female with Sickle Cell Disease currently on the chronic exchange program who underwent automated red blood cell exchange (RCE) with 6 RBC units with target HgbS 16% and target HCT 28%.     I saw and evaluated the patient during the RCE.  The patient was resting in bed.  Vascular access functioned well.    WBC   Date/Time Value Ref Range Status   04/16/2025 02:25 PM 10.7 4.4 - 11.3 x10*3/uL Final     Hemoglobin   Date/Time Value Ref Range Status   04/16/2025 02:25 PM 10.4 (L) 12.0 - 16.0 g/dL Final     Hematocrit   Date/Time Value Ref Range Status   04/16/2025 02:25 PM 31.8 (L) 36.0 - 46.0 % Final     Platelets   Date/Time Value Ref Range Status   04/16/2025 02:25  150 - 450 x10*3/uL Final        POCT Calcium, Ionized   Date/Time Value Ref Range Status   04/16/2025 02:25 PM 1.13 1.1 - 1.33 mmol/L Corrected     Comment:     The performance characteristics of ionized calcium tested  in heparinized plasma or serum have been validated by the  individual  laboratory site where testing is performed.   Testing on heparinized plasma or serum is not approved by   the FDA; however, such approval is not necessary.  NO RESULT  This is an appended report.  These results have been appended to a previously final verified report.        Hemoglobin S   Date/Time Value Ref Range Status   04/16/2025 02:25 PM 32.0 (H) <=0.0 % Final        Ferritin   Date/Time Value Ref Range Status   03/31/2025 09:11 AM 17 8 - 150 ng/mL Final        Pre-procedure vital signs at 1117:  T: 36.2 C, HR: 67, RR: 18, BP: 121/60  Pulse oximetry: 98% on room air    Post-procedure vital signs at 1335:  T: 36.4 C, HR: 73, RR: 20, BP: 110/58  Pulse oximetry: 97% on room air    Outcome: RCE completed.  Adverse Reaction: No    Assessment and Plan:  55 y.o. female with Sickle Cell Disease who underwent RCE as part of the chronic exchange program.    Draw post-procedure labs  Vascular access to be managed by the clinical team.  Next  procedure to be scheduled by the apheresis center in coordination with primary outpatient hematology team.  Tentative next RCE in 4-6 weeks.

## 2025-04-18 NOTE — POST-PROCEDURE NOTE
Interventional Radiology Post-Procedure Note    Attending:   Gerry Recio MD    Assistant:   MD Julio Downs DO    Diagnosis:   1. Sickle-cell-hemoglobin C disease with crisis (Multi)  IR CVC nontunneled    IR CVC nontunneled      2. Chronic pain syndrome  IR CVC nontunneled    IR CVC nontunneled           Description of procedure:   Using ultrasound and fluoroscopic guidance, a 13Fr apheresis catheter was placed in the right common femoral vein. No immediate complications. Primed with heparin and ready for use. See radiology report for full details.     Anesthesia:  MAC    Complications: None    Estimated Blood Loss: minimal    Medications (Filter: Administrations occurring from 0945 to 1038 on 04/18/25) As of 04/18/25 1038      fentaNYL PF (Sublimaze) injection (mcg) Total dose:  50 mcg      Date/Time Rate/Dose/Volume Action       04/18/25  1009 50 mcg Given               midazolam (Versed) injection (mg) Total dose:  0.5 mg      Date/Time Rate/Dose/Volume Action       04/18/25  1009 0.5 mg Given                     See detailed result report with images in PACS.    The patient tolerated the procedure well without incident or complication and is in stable condition.     Julio Edgar DO  Interventional Radiology  Nursing Quarterback: [id number], IR pager: [phone]     NON-Urgent on call weekends and after hours weekdays (5pm - 5am) IR pager: [phone]  Urgent & emergent on call weekends and after hours weekdays (5pm-7am) IR pager: [phone]

## 2025-04-18 NOTE — POST-PROCEDURE NOTE
Senait D'Hanis [id number]       Procedure type: Red cell Exchange    Replacement Fluids:  6units of PRBC used for procedure (RBCX)     Procedure Completed Without complications.    Attending notified of completion status. See flow sheet(s) for additional details.  Post-Vitals listed below.    Post-procedure vitals:  Vitals @1335  BP: 110/58  Temp: 36.4 °C (97.5 °F)  Temp Source: Temporal [Temporal]  Heart Rate: 73  Resp: 20  SpO2: 97 % on RA       Lizbeth Babb RN

## 2025-04-18 NOTE — PRE-PROCEDURE NOTE
Interventional Radiology Preprocedure Note    Indication for procedure: Diagnoses of Sickle-cell-hemoglobin C disease with crisis (Multi) and Chronic pain syndrome were pertinent to this visit.    Relevant review of systems: NA    Relevant Labs:   Lab Results   Component Value Date    CREATININE 0.95 04/16/2025    EGFR 70 04/16/2025    INR 2.4 (H) 04/16/2025    PROTIME 26.3 (H) 04/16/2025       Planned Sedation/Anesthesia: Moderate    Airway assessment: normal    Directed physical examination:    Alert and oriented  Symmetric chest rise  Non-tender abdomen    Mallampati: II (hard and soft palate, upper portion of tonsils and uvula visible)    ASA Score: ASA 2 - Patient with mild systemic disease with no functional limitations    Benefits, risks and alternatives of procedure and planned sedation have been discussed with the patient and/or their representative. All questions answered and they agree to proceed.     Vargas Da Silva MD   R-2, Diagnostic Radiology

## 2025-04-20 ASSESSMENT — ENCOUNTER SYMPTOMS
WOUND: 0
BACK PAIN: 1
LIGHT-HEADEDNESS: 0
NAUSEA: 0
HEADACHES: 0
ADENOPATHY: 0
MYALGIAS: 1
DIARRHEA: 0
HEMATURIA: 0
CHILLS: 0
BRUISES/BLEEDS EASILY: 0
EXTREMITY WEAKNESS: 0
VOMITING: 0
NERVOUS/ANXIOUS: 0
FEVER: 0
CONSTIPATION: 0
BLOOD IN STOOL: 0
NUMBNESS: 0
COUGH: 0
FATIGUE: 1
CHEST TIGHTNESS: 0
PALPITATIONS: 0
LEG SWELLING: 0
DEPRESSION: 0
SCLERAL ICTERUS: 0
ABDOMINAL PAIN: 0
SHORTNESS OF BREATH: 1
HEMOPTYSIS: 0
ARTHRALGIAS: 1
EYE PROBLEMS: 0
FLANK PAIN: 0
WHEEZING: 0

## 2025-04-20 NOTE — PROGRESS NOTES
SICKLE CELL OUTPATIENT NOTE  Patient ID: Senait Narvaez   Visit Type: Follow up visit     ASSESSMENT AND PLAN  Senait Narvaez is 55 y.o. female with     Hb SC SCD   Chronic sickle cell pain from SCD/AVN.  She is on chronic opioid therapy.  Encounter for DMT with chronic full automated red cell exchange transfusion, indication being multisystem organ failure (renal, liver, respiratory failure, and NSTEMI).  Exchange transfusion is every 6 weeks  History of recurrent arrhythmia including SVT, managed with valsalva maneuvers.  This has been well-controlled  Recurrent VTE/PE with SVC syndrome, on lifelong anticoagulation with warfarin   CAN and nocturnal hypoxia on night time supplemental oxygen.   Chronic constipation      PLAN   - We will proceed with planned red blood cell exchange transfusion as scheduled on Friday, 4/18/2025.  We will perform this outpatient, with plan to discharge home after procedure.  Patient advised to bring along her home oral pain regimen, and also take her antihypertensive medication including metoprolol prior to appointment.  - Consult IR service for placement of central venous catheter for planned procedure  - No changes in her current home oral regimen   Oral tylenol 650 mg every 6 hours prn for mild to moderate pain   Oral oxycodone 10-20 mg every 4-6 hours as needed for moderate to severe and breakthrough pain    Non pharmacologic methods of pain control   Flexeril as needed as a muscle relaxant  - Reviewed OARRS  - Continue metoprolol 12.5 mg BID  - Continue warfarin 5 mg daily with target INR of 2-3    - Continue 2L night time oxygen   - Daily folic acid 1 mg   -Next office visit will be in 6 weeks time prior to next exchange transfusion    Chief Complaint: Follow up for HB SC SCD, chronic pain and management of chronic red cell exchange transfusion      Interval History: Senait is present alone for follow-up of hemoglobin SC sickle cell disease.  Since her last office visit encounter, she  has followed up with ophthalmology and has completed laser surgery for vitreous hemorrhage.  She denies worsening of her vision including no blurriness no floaters, or double vision.  She continues to have chronic pain which is usually generalized in her case, as well as worse in her extremities.  She describes pain as feeling more achy and throbbing in nature.  She does get some relief from her current home oral pain regimen including oxycodone. Pain in clinic today is about 6-7 out of 10.  She denies chest pain, chest tightness, or shortness of breath.    She continues to tolerate anticoagulation with warfarin, and denies increased bruising or bleeding.  She remains on supplemental nighttime oxygen. Facial and neck swelling are unchanged from prior. She denies chest pain or chest tightness, headaches, dizziness, nausea or vomiting. She is afebrile, stooling well with prn laxatives. She denies focal neurologic deficits or leg ulcers.      Review of System:   Review of Systems   Constitutional:  Positive for fatigue. Negative for chills and fever.   HENT:   Negative for mouth sores and nosebleeds.    Eyes:  Negative for eye problems and icterus.   Respiratory:  Positive for shortness of breath. Negative for chest tightness, cough, hemoptysis and wheezing.    Cardiovascular:  Negative for chest pain, leg swelling and palpitations.   Gastrointestinal:  Negative for abdominal pain, blood in stool, constipation, diarrhea, nausea and vomiting.   Genitourinary:  Negative for hematuria.    Musculoskeletal:  Positive for arthralgias, back pain and myalgias. Negative for flank pain and gait problem.   Skin:  Negative for wound.   Neurological:  Negative for extremity weakness, gait problem, headaches, light-headedness and numbness.   Hematological:  Negative for adenopathy. Does not bruise/bleed easily.   Psychiatric/Behavioral:  Negative for depression. The patient is not nervous/anxious.       Allergies:   Allergies    Allergen Reactions    Vancomycin Rash    Oxycontin [Oxycodone] Drowsiness     Current Medications:   Medication Documentation Review Audit       Reviewed by Lizbeth Babb RN (Registered Nurse) on 04/18/25 at 1202      Medication Order Taking? Sig Documenting Provider Last Dose Status   albuterol 90 mcg/actuation inhaler [id number]  Inhale 2 puffs every 6 hours if needed for wheezing. Erich Whaley MD  Active   ascorbic acid (Vitamin C) 500 mg chewable tablet [id number]  Chew 1 tablet (500 mg) once daily. Historical Provider, MD  Active   atropine 1 % ophthalmic solution [id number]  Administer 1 drop into the left eye once daily at bedtime. Please continue to administer to Left eye until seen at outpt clinic with ophthalmology this upcoming week TOMAS De  Active   cyclobenzaprine (Flexeril) 10 mg tablet [id number]  Take 1 tablet (10 mg) by mouth 3 times a day as needed for muscle spasms. Erich Whaley MD  Active   enoxaparin (Lovenox) 100 mg/mL syringe [id number]  Inject 1 mL (100 mg) under the skin 2 times a day. Continue to check INR daily at home. Once INR is 2-3 STOP lovenox and continue warfarin daily TOMAS De  Active   fluticasone (Flonase) 50 mcg/actuation nasal spray [id number]  Administer 2 sprays into each nostril once daily as needed for rhinitis or allergies. Historical Provider, MD  Active   folic acid (Folvite) 1 mg tablet [id number]  Take 1 tablet (1 mg) by mouth once daily. Historical Provider, MD  Active   furosemide (Lasix) 20 mg tablet [id number]  Take 1 tablet (20 mg) by mouth every other day. Ankur White DO  Active   loratadine (Claritin) 10 mg tablet [id number]  Take 1 tablet (10 mg) by mouth once daily as needed for allergies. Historical Provider, MD  Active   metoprolol tartrate (Lopressor) 25 mg tablet [id number]  Take 1 tablet (25 mg) by mouth 2 times a day. Ankur White DO  Active   multivitamin tablet [id number]  Take 1 tablet by mouth  once daily. Medardo Wright MD  Active   naloxone (Narcan) 4 mg/0.1 mL nasal spray [id number]  Administer 1 spray (4 mg) into affected nostril(s) if needed for opioid reversal. May repeat every 2-3 minutes if needed, alternating nostrils, until medical assistance becomes available. Erich Whaley MD  Active   ondansetron (Zofran) 4 mg tablet [id number]  Take 1 tablet (4 mg) by mouth every 8 hours if needed for nausea or vomiting. Erich Whaley MD  Active   oxyCODONE (Roxicodone) 10 mg immediate release tablet [id number]  Take 1 tablet (10 mg) by mouth every 4 hours if needed for severe pain (7 - 10) (pain). TOMAS Mendez  Active   oxygen (O2) gas therapy [id number]  Inhale 1 each continuously. TOMAS Contreras  Active   traZODone (Desyrel) 50 mg tablet [id number]  Take 1 tablet (50 mg) by mouth as needed at bedtime for sleep. Lisa Giles MD   25 2359   triamcinolone (Kenalog) 0.1 % cream [id number]  Apply topically 2 times a day. Apply triamcinolone 0.1% cream to both the black plaques as well as the red areas across the trunk and thighs TOMAS De  Active   warfarin (Coumadin) 5 mg tablet [id number]  Take 1 tablet (5 mg) by mouth once daily in the evening. Erich Whaley MD  Active   white petrolatum (Aquaphor) 41 % ointment ointment [id number]  Apply 2 Applications topically 2 times a day. Apply vaseline to all eroded/denuded areas. 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   History of  retinal detachment, likely secondary to sickle cell disease.   Obstructive Sleep and significant nocturnal hemoglobin desaturation    Bilateral lower extremity edema, recurrent  SVC syndrome S/P balloon angioplasty of SVC/left brachiocephalic vein, removal of left sided Mediport catheter (1/21/20)  Syncopal episodes   Balloon removed via IR on April 2023.   SVT event with conversion with 1 dose 6mg of adenosine on 5/5/23  Admission June 2023 underwent RHC/LHC on 6/16 with mPA pressure 36, wedge 14, CI 4.2 and her coronary angiogram revealed no angiographic evidence of obstructive CAD. Dx of pulm HTN.     Family History:  Family History   Problem Relation    Diabetes Father    Gout Father    Sickle cell trait Father    Seizures Sister    Diabetes Other    Uterine cancer Other    Other (congenital blindness) Cousin     Social History:   Senait Narvaez  reports that she quit smoking about 11 years ago. Her smoking use included cigarettes. She has been exposed to tobacco smoke. She has never used smokeless tobacco.  reports that she does not currently use drugs.    EXAMINATION FINDINGS   /58 (BP Location: Left arm, Patient Position: Sitting, BP Cuff Size: Large adult)   Pulse 81   Temp 35.8 °C (96.4 °F) (Skin)   Resp 18   Wt 99 kg (218 lb 4.1 oz)   SpO2 95%   BMI 36.32 kg/m²   2.13 meters squared    Physical Exam  Vitals and nursing note reviewed.   Constitutional:       General: She is not in acute distress.     Appearance: She is not toxic-appearing.   Neck:      Vascular: No carotid bruit.      Comments: SVC syndrome with distension of her neck veins  Cardiovascular:      Rate and Rhythm: Normal rate and regular rhythm.      Pulses: Normal pulses.      Heart sounds: No murmur heard.     No gallop.   Pulmonary:      Effort: Pulmonary effort is normal. No respiratory distress.      Breath sounds: Normal breath sounds. No wheezing.   Abdominal:      General: Bowel sounds are normal.      Palpations: Abdomen is  soft.      Tenderness: There is no abdominal tenderness. There is no guarding.   Musculoskeletal:         General: No swelling or deformity.      Cervical back: No rigidity or tenderness.      Right lower leg: No edema.      Left lower leg: No edema.   Lymphadenopathy:      Cervical: No cervical adenopathy.   Skin:     General: Skin is warm.      Capillary Refill: Capillary refill takes less than 2 seconds.      Findings: No lesion or rash.   Neurological:      General: No focal deficit present.      Mental Status: She is alert and oriented to person, place, and time. Mental status is at baseline.      Cranial Nerves: No cranial nerve deficit.      Motor: No weakness.      Gait: Gait normal.   Psychiatric:         Mood and Affect: Mood normal.         Behavior: Behavior normal.     LABS   Latest Reference Range & Units 12/30/24 10:53   GLUCOSE 74 - 99 mg/dL 97   SODIUM 136 - 145 mmol/L 137   POTASSIUM 3.5 - 5.3 mmol/L 4.0   CHLORIDE 98 - 107 mmol/L 104   Bicarbonate 21 - 32 mmol/L 23   Anion Gap 10 - 20 mmol/L 14   Blood Urea Nitrogen 6 - 23 mg/dL 11   Creatinine 0.50 - 1.05 mg/dL 0.76   EGFR >60 mL/min/1.73m*2 >90   Calcium 8.6 - 10.6 mg/dL 9.1   PHOSPHORUS 2.5 - 4.9 mg/dL 3.8   Albumin 3.4 - 5.0 g/dL 3.4        Erich Whaley MD

## 2025-04-21 ENCOUNTER — TELEPHONE (OUTPATIENT)
Dept: ADMISSION | Facility: HOSPITAL | Age: 55
End: 2025-04-21
Payer: COMMERCIAL

## 2025-04-21 DIAGNOSIS — D57.00 SICKLE CELL CRISIS (MULTI): ICD-10-CM

## 2025-04-21 RX ORDER — OXYCODONE HYDROCHLORIDE 10 MG/1
10 TABLET ORAL EVERY 4 HOURS PRN
Qty: 75 TABLET | Refills: 0 | Status: SHIPPED | OUTPATIENT
Start: 2025-04-21

## 2025-04-21 NOTE — TELEPHONE ENCOUNTER
Senait Narvaez called the refill line for Oxycodone 10mg. Requesting refills be sent to [facility] pharmacy; message sent to Sickle Cell team to submit.

## 2025-04-22 LAB
HEMOGLOBIN A2: 2.9 % (ref 2–3.5)
HEMOGLOBIN A: 70 % (ref 95.8–98)
HEMOGLOBIN C: 13.2 %
HEMOGLOBIN F: 0.4 % (ref 0–2)
HEMOGLOBIN IDENTIFICATION INTERPRETATION: ABNORMAL
HEMOGLOBIN S: 13.5 %
PATH REVIEW-HGB IDENTIFICATION: ABNORMAL

## 2025-04-22 NOTE — PROGRESS NOTES
Called and spoke with pt.  She has not tested as scheduled due to holding her warfarin for blood transfusion.  Pt states she restarted warfarin on 4/20/25.  She will test and report her INR tomorrow 4/23/25.

## 2025-04-23 ENCOUNTER — ANTICOAGULATION - WARFARIN VISIT (OUTPATIENT)
Dept: CARDIOLOGY | Facility: CLINIC | Age: 55
End: 2025-04-23
Payer: COMMERCIAL

## 2025-04-23 DIAGNOSIS — I26.99 RECURRENT PULMONARY EMBOLISM (MULTI): Primary | ICD-10-CM

## 2025-04-23 DIAGNOSIS — I82.4Z9 DEEP VEIN THROMBOSIS (DVT) OF DISTAL VEIN OF LOWER EXTREMITY, UNSPECIFIED CHRONICITY, UNSPECIFIED LATERALITY: ICD-10-CM

## 2025-04-23 DIAGNOSIS — I82.210 THROMBOSIS OF SUPERIOR VENA CAVA (MULTI): ICD-10-CM

## 2025-04-23 NOTE — PROGRESS NOTES
Patient identification verified with 2 identifiers.    Location: [facility] Patient Self-Testing Program [phone]    Referring Physician:  Erich Whaley MD  Enrollment/ Re-enrollment date: 2025   INR Goal: 2.0-3.0  INR monitoring is per [facility] protocol.  Anticoagulation Medication: warfarin  Indication: Deep Vein Thrombosis (DVT)    Subjective   Bleeding signs/symptoms: No    Bruising: No   Major bleeding event: No  Thrombosis signs/symptoms: No  Thromboembolic event: No  Missed doses: No  Extra doses: No  Medication changes: No  Dietary changes: No  Change in health: No  Change in activity: No  Alcohol: No  Other concerns: No    Upcoming Procedures:  Does the Patient Have any upcoming procedures that require interruption in anticoagulation therapy? no  Does the patient require bridging? no      Anticoagulation Summary  As of 2025      INR goal:  2.0-3.0   TTR:  80.2% (1.1 y)   INR used for dosin.50 (2025)   Weekly warfarin total:  35 mg               Assessment/Plan   Therapeutic     1. New dose: Patient usually tests on .  Will maintain dose and patient will retest on 25.    2. Next INR: 1 week      Education provided to patient during the visit:  Patient instructed to call in interim with questions, concerns and changes.

## 2025-05-02 ENCOUNTER — ANTICOAGULATION - WARFARIN VISIT (OUTPATIENT)
Dept: CARDIOLOGY | Facility: CLINIC | Age: 55
End: 2025-05-02
Payer: COMMERCIAL

## 2025-05-02 DIAGNOSIS — I82.210 THROMBOSIS OF SUPERIOR VENA CAVA (MULTI): ICD-10-CM

## 2025-05-02 DIAGNOSIS — I82.4Z9 DEEP VEIN THROMBOSIS (DVT) OF DISTAL VEIN OF LOWER EXTREMITY, UNSPECIFIED CHRONICITY, UNSPECIFIED LATERALITY: ICD-10-CM

## 2025-05-02 DIAGNOSIS — I26.99 RECURRENT PULMONARY EMBOLISM (MULTI): Primary | ICD-10-CM

## 2025-05-02 LAB
INR IN PPP BY COAGULATION ASSAY EXTERNAL: 2.2
PROTHROMBIN TIME (PT) IN PPP BY COAGULATION ASSAY EXTERNAL: NORMAL

## 2025-05-02 NOTE — PROGRESS NOTES
Patient identification verified with 2 identifiers.    Location: [facility] Patient Self-Testing Program [phone]    Referring Physician:  Erich Whaley MD  Enrollment/ Re-enrollment date: 2025   INR Goal: 2.0-3.0  INR monitoring is per [facility] protocol.  Anticoagulation Medication: warfarin  Indication: Deep Vein Thrombosis (DVT)    Subjective   Bleeding signs/symptoms: No    Bruising: No   Major bleeding event: No  Thrombosis signs/symptoms: No  Thromboembolic event: No  Missed doses: No  Extra doses: No  Medication changes: No  Dietary changes: No  Change in health: No  Change in activity: No  Alcohol: No  Other concerns: No    Upcoming Procedures:  Does the Patient Have any upcoming procedures that require interruption in anticoagulation therapy? no  Does the patient require bridging? no      Anticoagulation Summary  As of 2025      INR goal:  2.0-3.0   TTR:  80.6% (1.1 y)   INR used for dosin.20 (2025)   Weekly warfarin total:  35 mg               Assessment/Plan   Therapeutic     1. New dose: no change    2. Next INR: 2 weeks      Education provided to patient during the visit:  Patient instructed to call in interim with questions, concerns and changes.   Patient educated on compliance with dosing, follow up appointments, and prescribed plan of care.

## 2025-05-05 ENCOUNTER — TELEPHONE (OUTPATIENT)
Dept: ADMISSION | Facility: HOSPITAL | Age: 55
End: 2025-05-05
Payer: COMMERCIAL

## 2025-05-05 DIAGNOSIS — D57.00 SICKLE CELL CRISIS (MULTI): ICD-10-CM

## 2025-05-05 RX ORDER — OXYCODONE HYDROCHLORIDE 10 MG/1
10 TABLET ORAL EVERY 4 HOURS PRN
Qty: 75 TABLET | Refills: 0 | Status: ON HOLD | OUTPATIENT
Start: 2025-05-05

## 2025-05-07 ENCOUNTER — HOSPITAL ENCOUNTER (OUTPATIENT)
Dept: RADIOLOGY | Facility: HOSPITAL | Age: 55
Discharge: HOME | End: 2025-05-07
Payer: COMMERCIAL

## 2025-05-07 ENCOUNTER — HOSPITAL ENCOUNTER (INPATIENT)
Facility: HOSPITAL | Age: 55
DRG: 871 | End: 2025-05-07
Attending: INTERNAL MEDICINE | Admitting: INTERNAL MEDICINE
Payer: COMMERCIAL

## 2025-05-07 ENCOUNTER — APPOINTMENT (OUTPATIENT)
Dept: ORTHOPEDIC SURGERY | Facility: CLINIC | Age: 55
End: 2025-05-07
Payer: COMMERCIAL

## 2025-05-07 ENCOUNTER — OFFICE VISIT (OUTPATIENT)
Dept: HEMATOLOGY/ONCOLOGY | Facility: HOSPITAL | Age: 55
End: 2025-05-07
Payer: COMMERCIAL

## 2025-05-07 VITALS
OXYGEN SATURATION: 93 % | RESPIRATION RATE: 14 BRPM | TEMPERATURE: 97.9 F | DIASTOLIC BLOOD PRESSURE: 49 MMHG | SYSTOLIC BLOOD PRESSURE: 130 MMHG | HEART RATE: 78 BPM

## 2025-05-07 DIAGNOSIS — J18.9 PNEUMONIA DUE TO INFECTIOUS ORGANISM, UNSPECIFIED LATERALITY, UNSPECIFIED PART OF LUNG: ICD-10-CM

## 2025-05-07 DIAGNOSIS — I82.4Z9 DEEP VEIN THROMBOSIS (DVT) OF DISTAL VEIN OF LOWER EXTREMITY, UNSPECIFIED CHRONICITY, UNSPECIFIED LATERALITY: ICD-10-CM

## 2025-05-07 DIAGNOSIS — D57.01 SICKLE CELL CRISIS ACUTE CHEST SYNDROME (MULTI): ICD-10-CM

## 2025-05-07 DIAGNOSIS — J18.9 PNEUMONIA DUE TO INFECTIOUS ORGANISM, UNSPECIFIED LATERALITY, UNSPECIFIED PART OF LUNG: Primary | ICD-10-CM

## 2025-05-07 DIAGNOSIS — D57.00 SICKLE CELL CRISIS (MULTI): ICD-10-CM

## 2025-05-07 DIAGNOSIS — R52 ACUTE PAIN: Primary | ICD-10-CM

## 2025-05-07 DIAGNOSIS — M79.89 SWELLING OF RIGHT LOWER EXTREMITY: ICD-10-CM

## 2025-05-07 DIAGNOSIS — I25.5 CARDIOMYOPATHY, ISCHEMIC: ICD-10-CM

## 2025-05-07 DIAGNOSIS — D57.00: ICD-10-CM

## 2025-05-07 DIAGNOSIS — R60.0 LOCALIZED EDEMA: ICD-10-CM

## 2025-05-07 DIAGNOSIS — I95.9 HYPOTENSION, UNSPECIFIED HYPOTENSION TYPE: ICD-10-CM

## 2025-05-07 DIAGNOSIS — Z86.718 PERSONAL HISTORY OF OTHER VENOUS THROMBOSIS AND EMBOLISM: ICD-10-CM

## 2025-05-07 LAB
ALBUMIN SERPL BCP-MCNC: 3.6 G/DL (ref 3.4–5)
ALBUMIN SERPL BCP-MCNC: 3.6 G/DL (ref 3.4–5)
ALP SERPL-CCNC: 127 U/L (ref 33–110)
ALT SERPL W P-5'-P-CCNC: 9 U/L (ref 7–45)
ANION GAP SERPL CALC-SCNC: 11 MMOL/L (ref 10–20)
ANION GAP SERPL CALC-SCNC: 12 MMOL/L (ref 10–20)
APTT PPP: 30 SECONDS (ref 26–36)
AST SERPL W P-5'-P-CCNC: 15 U/L (ref 9–39)
BASOPHILS # BLD AUTO: 0.02 X10*3/UL (ref 0–0.1)
BASOPHILS NFR BLD AUTO: 0.2 %
BILIRUB SERPL-MCNC: 2.2 MG/DL (ref 0–1.2)
BUN SERPL-MCNC: 23 MG/DL (ref 6–23)
BUN SERPL-MCNC: 25 MG/DL (ref 6–23)
BURR CELLS BLD QL SMEAR: NORMAL
CALCIUM SERPL-MCNC: 8.5 MG/DL (ref 8.6–10.6)
CALCIUM SERPL-MCNC: 8.8 MG/DL (ref 8.6–10.3)
CHLORIDE SERPL-SCNC: 100 MMOL/L (ref 98–107)
CHLORIDE SERPL-SCNC: 101 MMOL/L (ref 98–107)
CO2 SERPL-SCNC: 26 MMOL/L (ref 21–32)
CO2 SERPL-SCNC: 30 MMOL/L (ref 21–32)
CREAT SERPL-MCNC: 2.75 MG/DL (ref 0.5–1.05)
CREAT SERPL-MCNC: 3.49 MG/DL (ref 0.5–1.05)
DACRYOCYTES BLD QL SMEAR: NORMAL
EGFRCR SERPLBLD CKD-EPI 2021: 15 ML/MIN/1.73M*2
EGFRCR SERPLBLD CKD-EPI 2021: 20 ML/MIN/1.73M*2
EOSINOPHIL # BLD AUTO: 0.03 X10*3/UL (ref 0–0.7)
EOSINOPHIL NFR BLD AUTO: 0.3 %
ERYTHROCYTE [DISTWIDTH] IN BLOOD BY AUTOMATED COUNT: 29.3 % (ref 11.5–14.5)
FLUAV RNA RESP QL NAA+PROBE: NOT DETECTED
FLUBV RNA RESP QL NAA+PROBE: NOT DETECTED
GIANT PLATELETS BLD QL SMEAR: NORMAL
GLUCOSE SERPL-MCNC: 84 MG/DL (ref 74–99)
GLUCOSE SERPL-MCNC: 89 MG/DL (ref 74–99)
HCT VFR BLD AUTO: 29.5 % (ref 36–46)
HGB BLD-MCNC: 9.2 G/DL (ref 12–16)
HGB RETIC QN: 19 PG (ref 28–38)
IMM GRANULOCYTES # BLD AUTO: 0.04 X10*3/UL (ref 0–0.7)
IMM GRANULOCYTES NFR BLD AUTO: 0.3 % (ref 0–0.9)
IMMATURE RETIC FRACTION: 10.2 %
INR PPP: 1.5 (ref 0.9–1.1)
LDH SERPL L TO P-CCNC: 180 U/L (ref 84–246)
LYMPHOCYTES # BLD AUTO: 2 X10*3/UL (ref 1.2–4.8)
LYMPHOCYTES NFR BLD AUTO: 17 %
MCH RBC QN AUTO: 24.6 PG (ref 26–34)
MCHC RBC AUTO-ENTMCNC: 31.2 G/DL (ref 32–36)
MCV RBC AUTO: 79 FL (ref 80–100)
MONOCYTES # BLD AUTO: 0.79 X10*3/UL (ref 0.1–1)
MONOCYTES NFR BLD AUTO: 6.7 %
NEUTROPHILS # BLD AUTO: 8.86 X10*3/UL (ref 1.2–7.7)
NEUTROPHILS NFR BLD AUTO: 75.5 %
NRBC BLD-RTO: 0.8 /100 WBCS (ref 0–0)
OVALOCYTES BLD QL SMEAR: NORMAL
PAPPENHEIMER BOD BLD QL SMEAR: PRESENT
PHOSPHATE SERPL-MCNC: 5.6 MG/DL (ref 2.5–4.9)
PLATELET # BLD AUTO: 336 X10*3/UL (ref 150–450)
POLYCHROMASIA BLD QL SMEAR: NORMAL
POTASSIUM SERPL-SCNC: 4.4 MMOL/L (ref 3.5–5.3)
POTASSIUM SERPL-SCNC: 4.7 MMOL/L (ref 3.5–5.3)
PROCALCITONIN SERPL-MCNC: 0.73 NG/ML
PROT SERPL-MCNC: 7.1 G/DL (ref 6.4–8.2)
PROTHROMBIN TIME: 16.8 SECONDS (ref 9.8–12.4)
RBC # BLD AUTO: 3.74 X10*6/UL (ref 4–5.2)
RBC MORPH BLD: NORMAL
RETICS #: 0.36 X10*6/UL (ref 0.02–0.08)
RETICS/RBC NFR AUTO: 9.5 % (ref 0.5–2)
SARS-COV-2 RNA RESP QL NAA+PROBE: NOT DETECTED
SCHISTOCYTES BLD QL SMEAR: NORMAL
SICKLE CELLS BLD QL SMEAR: NORMAL
SODIUM SERPL-SCNC: 134 MMOL/L (ref 136–145)
SODIUM SERPL-SCNC: 137 MMOL/L (ref 136–145)
SPHEROCYTES BLD QL SMEAR: NORMAL
TARGETS BLD QL SMEAR: NORMAL
WBC # BLD AUTO: 11.7 X10*3/UL (ref 4.4–11.3)

## 2025-05-07 PROCEDURE — 71045 X-RAY EXAM CHEST 1 VIEW: CPT

## 2025-05-07 PROCEDURE — 84145 PROCALCITONIN (PCT): CPT

## 2025-05-07 PROCEDURE — 36415 COLL VENOUS BLD VENIPUNCTURE: CPT

## 2025-05-07 PROCEDURE — 87040 BLOOD CULTURE FOR BACTERIA: CPT

## 2025-05-07 PROCEDURE — 2500000001 HC RX 250 WO HCPCS SELF ADMINISTERED DRUGS (ALT 637 FOR MEDICARE OP): Performed by: PHYSICIAN ASSISTANT

## 2025-05-07 PROCEDURE — 96365 THER/PROPH/DIAG IV INF INIT: CPT | Mod: INF

## 2025-05-07 PROCEDURE — 87636 SARSCOV2 & INF A&B AMP PRB: CPT | Performed by: PHYSICIAN ASSISTANT

## 2025-05-07 PROCEDURE — 2500000005 HC RX 250 GENERAL PHARMACY W/O HCPCS

## 2025-05-07 PROCEDURE — 99291 CRITICAL CARE FIRST HOUR: CPT

## 2025-05-07 PROCEDURE — 99215 OFFICE O/P EST HI 40 MIN: CPT | Performed by: PHYSICIAN ASSISTANT

## 2025-05-07 PROCEDURE — 80069 RENAL FUNCTION PANEL: CPT

## 2025-05-07 PROCEDURE — 83020 HEMOGLOBIN ELECTROPHORESIS: CPT | Performed by: PATHOLOGY

## 2025-05-07 PROCEDURE — 2500000004 HC RX 250 GENERAL PHARMACY W/ HCPCS (ALT 636 FOR OP/ED): Mod: JZ

## 2025-05-07 PROCEDURE — 2500000004 HC RX 250 GENERAL PHARMACY W/ HCPCS (ALT 636 FOR OP/ED): Mod: JW | Performed by: PHYSICIAN ASSISTANT

## 2025-05-07 PROCEDURE — 2500000004 HC RX 250 GENERAL PHARMACY W/ HCPCS (ALT 636 FOR OP/ED): Mod: JZ,TB

## 2025-05-07 PROCEDURE — 96372 THER/PROPH/DIAG INJ SC/IM: CPT

## 2025-05-07 PROCEDURE — 85025 COMPLETE CBC W/AUTO DIFF WBC: CPT

## 2025-05-07 PROCEDURE — 83021 HEMOGLOBIN CHROMOTOGRAPHY: CPT

## 2025-05-07 PROCEDURE — 99417 PROLNG OP E/M EACH 15 MIN: CPT | Performed by: PHYSICIAN ASSISTANT

## 2025-05-07 PROCEDURE — 2500000004 HC RX 250 GENERAL PHARMACY W/ HCPCS (ALT 636 FOR OP/ED): Mod: JZ,TB | Performed by: PHYSICIAN ASSISTANT

## 2025-05-07 PROCEDURE — 83615 LACTATE (LD) (LDH) ENZYME: CPT

## 2025-05-07 PROCEDURE — 1200000003 HC ONCOLOGY  ROOM WITH TELEMETRY DAILY

## 2025-05-07 PROCEDURE — 85045 AUTOMATED RETICULOCYTE COUNT: CPT

## 2025-05-07 PROCEDURE — 84075 ASSAY ALKALINE PHOSPHATASE: CPT

## 2025-05-07 PROCEDURE — 96375 TX/PRO/DX INJ NEW DRUG ADDON: CPT | Mod: INF

## 2025-05-07 PROCEDURE — 2500000001 HC RX 250 WO HCPCS SELF ADMINISTERED DRUGS (ALT 637 FOR MEDICARE OP)

## 2025-05-07 PROCEDURE — 85610 PROTHROMBIN TIME: CPT

## 2025-05-07 PROCEDURE — 99215 OFFICE O/P EST HI 40 MIN: CPT | Mod: 25 | Performed by: PHYSICIAN ASSISTANT

## 2025-05-07 RX ORDER — AZITHROMYCIN MONOHYDRATE 500 MG/5ML
500 INJECTION, POWDER, LYOPHILIZED, FOR SOLUTION INTRAVENOUS DAILY
Status: DISCONTINUED | OUTPATIENT
Start: 2025-05-08 | End: 2025-05-07

## 2025-05-07 RX ORDER — ATROPINE SULFATE 10 MG/ML
1 SOLUTION/ DROPS OPHTHALMIC NIGHTLY
Status: DISCONTINUED | OUTPATIENT
Start: 2025-05-07 | End: 2025-05-15 | Stop reason: HOSPADM

## 2025-05-07 RX ORDER — AMOXICILLIN 250 MG
2 CAPSULE ORAL 2 TIMES DAILY
Status: DISCONTINUED | OUTPATIENT
Start: 2025-05-07 | End: 2025-05-12

## 2025-05-07 RX ORDER — CEFTRIAXONE 1 G/50ML
1 INJECTION, SOLUTION INTRAVENOUS EVERY 24 HOURS
Status: CANCELLED | OUTPATIENT
Start: 2025-05-08

## 2025-05-07 RX ORDER — HYDROMORPHONE HYDROCHLORIDE 1 MG/ML
1 INJECTION, SOLUTION INTRAMUSCULAR; INTRAVENOUS; SUBCUTANEOUS
Status: COMPLETED | OUTPATIENT
Start: 2025-05-07 | End: 2025-05-07

## 2025-05-07 RX ORDER — WARFARIN SODIUM 5 MG/1
5 TABLET ORAL
Status: DISCONTINUED | OUTPATIENT
Start: 2025-05-08 | End: 2025-05-15 | Stop reason: HOSPADM

## 2025-05-07 RX ORDER — DOCUSATE SODIUM 100 MG/1
100 CAPSULE, LIQUID FILLED ORAL 2 TIMES DAILY PRN
Status: CANCELLED | OUTPATIENT
Start: 2025-05-07

## 2025-05-07 RX ORDER — HYDROMORPHONE HYDROCHLORIDE 1 MG/ML
1 INJECTION, SOLUTION INTRAMUSCULAR; INTRAVENOUS; SUBCUTANEOUS
Status: DISCONTINUED | OUTPATIENT
Start: 2025-05-07 | End: 2025-05-08

## 2025-05-07 RX ORDER — FLUTICASONE PROPIONATE 50 MCG
2 SPRAY, SUSPENSION (ML) NASAL DAILY PRN
Status: DISCONTINUED | OUTPATIENT
Start: 2025-05-07 | End: 2025-05-15 | Stop reason: HOSPADM

## 2025-05-07 RX ORDER — OXYCODONE HYDROCHLORIDE 10 MG/1
10 TABLET ORAL EVERY 4 HOURS PRN
Refills: 0 | Status: DISCONTINUED | OUTPATIENT
Start: 2025-05-07 | End: 2025-05-12

## 2025-05-07 RX ORDER — NALOXONE HYDROCHLORIDE 0.4 MG/ML
0.2 INJECTION, SOLUTION INTRAMUSCULAR; INTRAVENOUS; SUBCUTANEOUS EVERY 5 MIN PRN
Status: DISCONTINUED | OUTPATIENT
Start: 2025-05-07 | End: 2025-05-09

## 2025-05-07 RX ORDER — FOLIC ACID 1 MG/1
1 TABLET ORAL DAILY
Status: DISCONTINUED | OUTPATIENT
Start: 2025-05-07 | End: 2025-05-15 | Stop reason: HOSPADM

## 2025-05-07 RX ORDER — HYDROMORPHONE HYDROCHLORIDE 1 MG/ML
1 INJECTION, SOLUTION INTRAMUSCULAR; INTRAVENOUS; SUBCUTANEOUS
Status: CANCELLED | OUTPATIENT
Start: 2025-05-07

## 2025-05-07 RX ORDER — FUROSEMIDE 20 MG/1
20 TABLET ORAL EVERY OTHER DAY
Status: DISCONTINUED | OUTPATIENT
Start: 2025-05-08 | End: 2025-05-15 | Stop reason: HOSPADM

## 2025-05-07 RX ORDER — WARFARIN 7.5 MG/1
7.5 TABLET ORAL
Status: DISCONTINUED | OUTPATIENT
Start: 2025-05-13 | End: 2025-05-15 | Stop reason: HOSPADM

## 2025-05-07 RX ORDER — HYDROMORPHONE HYDROCHLORIDE 1 MG/ML
0.5 INJECTION, SOLUTION INTRAMUSCULAR; INTRAVENOUS; SUBCUTANEOUS
Status: CANCELLED | OUTPATIENT
Start: 2025-05-07

## 2025-05-07 RX ORDER — POLYETHYLENE GLYCOL 3350 17 G/17G
17 POWDER, FOR SOLUTION ORAL 2 TIMES DAILY PRN
Status: CANCELLED | OUTPATIENT
Start: 2025-05-07

## 2025-05-07 RX ORDER — DIPHENHYDRAMINE HCL 25 MG
25 CAPSULE ORAL ONCE
Status: COMPLETED | OUTPATIENT
Start: 2025-05-07 | End: 2025-05-07

## 2025-05-07 RX ORDER — CYCLOBENZAPRINE HCL 10 MG
10 TABLET ORAL 3 TIMES DAILY PRN
Status: DISCONTINUED | OUTPATIENT
Start: 2025-05-07 | End: 2025-05-15 | Stop reason: HOSPADM

## 2025-05-07 RX ORDER — HYDROMORPHONE HYDROCHLORIDE 1 MG/ML
INJECTION, SOLUTION INTRAMUSCULAR; INTRAVENOUS; SUBCUTANEOUS
Status: COMPLETED
Start: 2025-05-07 | End: 2025-05-07

## 2025-05-07 RX ORDER — CETIRIZINE HYDROCHLORIDE 10 MG/1
10 TABLET ORAL DAILY
Status: DISCONTINUED | OUTPATIENT
Start: 2025-05-07 | End: 2025-05-15 | Stop reason: HOSPADM

## 2025-05-07 RX ORDER — ONDANSETRON 4 MG/1
4 TABLET, FILM COATED ORAL EVERY 8 HOURS PRN
Status: DISCONTINUED | OUTPATIENT
Start: 2025-05-07 | End: 2025-05-15 | Stop reason: HOSPADM

## 2025-05-07 RX ORDER — AZITHROMYCIN 500 MG/1
500 TABLET, FILM COATED ORAL
Status: DISCONTINUED | OUTPATIENT
Start: 2025-05-08 | End: 2025-05-08

## 2025-05-07 RX ORDER — CYCLOBENZAPRINE HCL 10 MG
10 TABLET ORAL ONCE
Status: COMPLETED | OUTPATIENT
Start: 2025-05-07 | End: 2025-05-07

## 2025-05-07 RX ORDER — SODIUM CHLORIDE, SODIUM LACTATE, POTASSIUM CHLORIDE, CALCIUM CHLORIDE 600; 310; 30; 20 MG/100ML; MG/100ML; MG/100ML; MG/100ML
500 INJECTION, SOLUTION INTRAVENOUS ONCE
Status: COMPLETED | OUTPATIENT
Start: 2025-05-07 | End: 2025-05-07

## 2025-05-07 RX ORDER — AZITHROMYCIN 500 MG/1
500 TABLET, FILM COATED ORAL
Status: DISCONTINUED | OUTPATIENT
Start: 2025-05-08 | End: 2025-05-07

## 2025-05-07 RX ORDER — HYDROMORPHONE HYDROCHLORIDE 1 MG/ML
1 INJECTION, SOLUTION INTRAMUSCULAR; INTRAVENOUS; SUBCUTANEOUS ONCE
Status: COMPLETED | OUTPATIENT
Start: 2025-05-07 | End: 2025-05-07

## 2025-05-07 RX ORDER — ASCORBIC ACID 500 MG
500 TABLET ORAL DAILY
Status: DISCONTINUED | OUTPATIENT
Start: 2025-05-07 | End: 2025-05-15 | Stop reason: HOSPADM

## 2025-05-07 RX ORDER — ACETAMINOPHEN 325 MG/1
975 TABLET ORAL EVERY 8 HOURS
Status: DISCONTINUED | OUTPATIENT
Start: 2025-05-07 | End: 2025-05-09

## 2025-05-07 RX ORDER — CEFTRIAXONE 1 G/50ML
1 INJECTION, SOLUTION INTRAVENOUS EVERY 24 HOURS
Status: DISCONTINUED | OUTPATIENT
Start: 2025-05-08 | End: 2025-05-08

## 2025-05-07 RX ORDER — OXYCODONE HYDROCHLORIDE 5 MG/1
5 TABLET ORAL EVERY 4 HOURS PRN
Refills: 0 | Status: DISCONTINUED | OUTPATIENT
Start: 2025-05-07 | End: 2025-05-12

## 2025-05-07 RX ORDER — ALBUTEROL SULFATE 90 UG/1
2 INHALANT RESPIRATORY (INHALATION) EVERY 6 HOURS PRN
Status: DISCONTINUED | OUTPATIENT
Start: 2025-05-07 | End: 2025-05-15 | Stop reason: HOSPADM

## 2025-05-07 RX ORDER — ONDANSETRON HYDROCHLORIDE 8 MG/1
8 TABLET, FILM COATED ORAL ONCE
Status: COMPLETED | OUTPATIENT
Start: 2025-05-07 | End: 2025-05-07

## 2025-05-07 RX ORDER — AZITHROMYCIN MONOHYDRATE 500 MG/5ML
500 INJECTION, POWDER, LYOPHILIZED, FOR SOLUTION INTRAVENOUS ONCE
Status: DISCONTINUED | OUTPATIENT
Start: 2025-05-07 | End: 2025-05-07

## 2025-05-07 RX ORDER — METOPROLOL TARTRATE 25 MG/1
25 TABLET, FILM COATED ORAL 2 TIMES DAILY
Status: DISCONTINUED | OUTPATIENT
Start: 2025-05-07 | End: 2025-05-15 | Stop reason: HOSPADM

## 2025-05-07 RX ORDER — DIPHENHYDRAMINE HCL 25 MG
25 CAPSULE ORAL EVERY 6 HOURS PRN
Status: CANCELLED | OUTPATIENT
Start: 2025-05-07

## 2025-05-07 RX ADMIN — HYDROMORPHONE HYDROCHLORIDE 1 MG: 1 INJECTION, SOLUTION INTRAMUSCULAR; INTRAVENOUS; SUBCUTANEOUS at 12:57

## 2025-05-07 RX ADMIN — CETIRIZINE HYDROCHLORIDE 10 MG: 10 TABLET, FILM COATED ORAL at 21:03

## 2025-05-07 RX ADMIN — CYCLOBENZAPRINE 10 MG: 10 TABLET, FILM COATED ORAL at 11:02

## 2025-05-07 RX ADMIN — HYDROMORPHONE HYDROCHLORIDE 1 MG: 1 INJECTION, SOLUTION INTRAMUSCULAR; INTRAVENOUS; SUBCUTANEOUS at 18:32

## 2025-05-07 RX ADMIN — DIPHENHYDRAMINE HYDROCHLORIDE 25 MG: 25 CAPSULE ORAL at 11:02

## 2025-05-07 RX ADMIN — HYDROMORPHONE HYDROCHLORIDE 1 MG: 1 INJECTION, SOLUTION INTRAMUSCULAR; INTRAVENOUS; SUBCUTANEOUS at 11:02

## 2025-05-07 RX ADMIN — HYDROMORPHONE HYDROCHLORIDE 1 MG: 1 INJECTION, SOLUTION INTRAMUSCULAR; INTRAVENOUS; SUBCUTANEOUS at 12:02

## 2025-05-07 RX ADMIN — SODIUM CHLORIDE, SODIUM LACTATE, POTASSIUM CHLORIDE, AND CALCIUM CHLORIDE 500 ML: .6; .31; .03; .02 INJECTION, SOLUTION INTRAVENOUS at 21:04

## 2025-05-07 RX ADMIN — HYDROMORPHONE HYDROCHLORIDE 1 MG: 1 INJECTION, SOLUTION INTRAMUSCULAR; INTRAVENOUS; SUBCUTANEOUS at 22:07

## 2025-05-07 RX ADMIN — ATROPINE SULFATE 1 DROP: 10 SOLUTION/ DROPS OPHTHALMIC at 22:07

## 2025-05-07 RX ADMIN — Medication 3 L/MIN: at 21:07

## 2025-05-07 RX ADMIN — CEFTRIAXONE SODIUM 1 G: 2 INJECTION, POWDER, FOR SOLUTION INTRAMUSCULAR; INTRAVENOUS at 15:24

## 2025-05-07 RX ADMIN — METOPROLOL TARTRATE 25 MG: 25 TABLET, FILM COATED ORAL at 21:03

## 2025-05-07 RX ADMIN — ONDANSETRON HYDROCHLORIDE 8 MG: 8 TABLET, FILM COATED ORAL at 11:02

## 2025-05-07 SDOH — ECONOMIC STABILITY: HOUSING INSECURITY: IN THE PAST 12 MONTHS, HOW MANY TIMES HAVE YOU MOVED WHERE YOU WERE LIVING?: 0

## 2025-05-07 SDOH — SOCIAL STABILITY: SOCIAL INSECURITY: DOES ANYONE TRY TO KEEP YOU FROM HAVING/CONTACTING OTHER FRIENDS OR DOING THINGS OUTSIDE YOUR HOME?: NO

## 2025-05-07 SDOH — HEALTH STABILITY: PHYSICAL HEALTH: ON AVERAGE, HOW MANY MINUTES DO YOU ENGAGE IN EXERCISE AT THIS LEVEL?: PATIENT DECLINED

## 2025-05-07 SDOH — ECONOMIC STABILITY: FOOD INSECURITY: WITHIN THE PAST 12 MONTHS, THE FOOD YOU BOUGHT JUST DIDN'T LAST AND YOU DIDN'T HAVE MONEY TO GET MORE.: NEVER TRUE

## 2025-05-07 SDOH — ECONOMIC STABILITY: HOUSING INSECURITY: IN THE LAST 12 MONTHS, WAS THERE A TIME WHEN YOU WERE NOT ABLE TO PAY THE MORTGAGE OR RENT ON TIME?: NO

## 2025-05-07 SDOH — SOCIAL STABILITY: SOCIAL INSECURITY: WERE YOU ABLE TO COMPLETE ALL THE BEHAVIORAL HEALTH SCREENINGS?: YES

## 2025-05-07 SDOH — SOCIAL STABILITY: SOCIAL INSECURITY: WITHIN THE LAST YEAR, HAVE YOU BEEN HUMILIATED OR EMOTIONALLY ABUSED IN OTHER WAYS BY YOUR PARTNER OR EX-PARTNER?: NO

## 2025-05-07 SDOH — SOCIAL STABILITY: SOCIAL INSECURITY: WITHIN THE LAST YEAR, HAVE YOU BEEN AFRAID OF YOUR PARTNER OR EX-PARTNER?: NO

## 2025-05-07 SDOH — ECONOMIC STABILITY: INCOME INSECURITY: IN THE PAST 12 MONTHS HAS THE ELECTRIC, GAS, OIL, OR WATER COMPANY THREATENED TO SHUT OFF SERVICES IN YOUR HOME?: NO

## 2025-05-07 SDOH — ECONOMIC STABILITY: FOOD INSECURITY: WITHIN THE PAST 12 MONTHS, YOU WORRIED THAT YOUR FOOD WOULD RUN OUT BEFORE YOU GOT THE MONEY TO BUY MORE.: NEVER TRUE

## 2025-05-07 SDOH — ECONOMIC STABILITY: FOOD INSECURITY: HOW HARD IS IT FOR YOU TO PAY FOR THE VERY BASICS LIKE FOOD, HOUSING, MEDICAL CARE, AND HEATING?: NOT HARD AT ALL

## 2025-05-07 SDOH — SOCIAL STABILITY: SOCIAL INSECURITY: DO YOU FEEL UNSAFE GOING BACK TO THE PLACE WHERE YOU ARE LIVING?: NO

## 2025-05-07 SDOH — SOCIAL STABILITY: SOCIAL INSECURITY: DO YOU FEEL ANYONE HAS EXPLOITED OR TAKEN ADVANTAGE OF YOU FINANCIALLY OR OF YOUR PERSONAL PROPERTY?: NO

## 2025-05-07 SDOH — SOCIAL STABILITY: SOCIAL INSECURITY: HAVE YOU HAD THOUGHTS OF HARMING ANYONE ELSE?: NO

## 2025-05-07 SDOH — SOCIAL STABILITY: SOCIAL INSECURITY: HAS ANYONE EVER THREATENED TO HURT YOUR FAMILY OR YOUR PETS?: NO

## 2025-05-07 SDOH — SOCIAL STABILITY: SOCIAL INSECURITY: ARE THERE ANY APPARENT SIGNS OF INJURIES/BEHAVIORS THAT COULD BE RELATED TO ABUSE/NEGLECT?: NO

## 2025-05-07 SDOH — SOCIAL STABILITY: SOCIAL INSECURITY: ABUSE: ADULT

## 2025-05-07 SDOH — SOCIAL STABILITY: SOCIAL INSECURITY: ARE YOU OR HAVE YOU BEEN THREATENED OR ABUSED PHYSICALLY, EMOTIONALLY, OR SEXUALLY BY ANYONE?: NO

## 2025-05-07 SDOH — SOCIAL STABILITY: SOCIAL INSECURITY: HAVE YOU HAD ANY THOUGHTS OF HARMING ANYONE ELSE?: NO

## 2025-05-07 ASSESSMENT — ENCOUNTER SYMPTOMS
CHILLS: 0
LIGHT-HEADEDNESS: 0
CHEST TIGHTNESS: 0
HEMATURIA: 0
NUMBNESS: 0
FEVER: 0
COUGH: 0
CHILLS: 0
FEVER: 0
WHEEZING: 0
DYSURIA: 0
HEADACHES: 0
NECK PAIN: 1
ABDOMINAL PAIN: 0
COUGH: 0
DIZZINESS: 1
NAUSEA: 0
DIARRHEA: 0
BACK PAIN: 1
HEADACHES: 0
VOMITING: 0
PALPITATIONS: 0
MYALGIAS: 1
SHORTNESS OF BREATH: 0

## 2025-05-07 ASSESSMENT — COGNITIVE AND FUNCTIONAL STATUS - GENERAL
DAILY ACTIVITIY SCORE: 24
WALKING IN HOSPITAL ROOM: A LITTLE
WALKING IN HOSPITAL ROOM: A LITTLE
CLIMB 3 TO 5 STEPS WITH RAILING: A LITTLE
DAILY ACTIVITIY SCORE: 24
CLIMB 3 TO 5 STEPS WITH RAILING: A LITTLE
PATIENT BASELINE BEDBOUND: NO
MOBILITY SCORE: 22
MOBILITY SCORE: 22

## 2025-05-07 ASSESSMENT — ACTIVITIES OF DAILY LIVING (ADL)
FEEDING YOURSELF: INDEPENDENT
ADEQUATE_TO_COMPLETE_ADL: YES
GROOMING: INDEPENDENT
DRESSING YOURSELF: INDEPENDENT
TOILETING: INDEPENDENT
PATIENT'S MEMORY ADEQUATE TO SAFELY COMPLETE DAILY ACTIVITIES?: YES
WALKS IN HOME: NEEDS ASSISTANCE
JUDGMENT_ADEQUATE_SAFELY_COMPLETE_DAILY_ACTIVITIES: YES
BATHING: INDEPENDENT
HEARING - LEFT EAR: FUNCTIONAL
LACK_OF_TRANSPORTATION: NO
HEARING - RIGHT EAR: FUNCTIONAL

## 2025-05-07 ASSESSMENT — COLUMBIA-SUICIDE SEVERITY RATING SCALE - C-SSRS
1. IN THE PAST MONTH, HAVE YOU WISHED YOU WERE DEAD OR WISHED YOU COULD GO TO SLEEP AND NOT WAKE UP?: NO
6. HAVE YOU EVER DONE ANYTHING, STARTED TO DO ANYTHING, OR PREPARED TO DO ANYTHING TO END YOUR LIFE?: NO
2. HAVE YOU ACTUALLY HAD ANY THOUGHTS OF KILLING YOURSELF?: NO

## 2025-05-07 ASSESSMENT — LIFESTYLE VARIABLES
AUDIT-C TOTAL SCORE: 0
AUDIT-C TOTAL SCORE: 0
HOW OFTEN DO YOU HAVE 6 OR MORE DRINKS ON ONE OCCASION: NEVER
SKIP TO QUESTIONS 9-10: 1
HOW OFTEN DO YOU HAVE A DRINK CONTAINING ALCOHOL: NEVER
HOW MANY STANDARD DRINKS CONTAINING ALCOHOL DO YOU HAVE ON A TYPICAL DAY: PATIENT DOES NOT DRINK

## 2025-05-07 ASSESSMENT — PAIN SCALES - GENERAL
PAINLEVEL_OUTOF10: 3
PAINLEVEL_OUTOF10: 10 - WORST POSSIBLE PAIN
PAINLEVEL_OUTOF10: 7
PAINLEVEL_OUTOF10: 8
PAINLEVEL_OUTOF10: 10-WORST PAIN EVER

## 2025-05-07 ASSESSMENT — PAIN - FUNCTIONAL ASSESSMENT
PAIN_FUNCTIONAL_ASSESSMENT: 0-10

## 2025-05-07 NOTE — PROGRESS NOTES
"Patient ID:  Senait Narvaez is a 55 y.o. female.  Subjective   Chief Complaint:   Senait Narvaez 54 yo female     HPI  Senait is a 55 y.o. 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 Also states that she feels like her face is swollen. No sick contact at home, had a cough and runny nose 1 week ago which has resolved. Denies any N/V/D, fever/chills, abd pain, SOB, palpitations.       ROS  Review of Systems   Constitutional:  Negative for chills and fever.   Respiratory:  Negative for chest tightness, cough, shortness of breath and wheezing.    Cardiovascular:  Negative for chest pain.   Musculoskeletal:  Positive for myalgias.   Neurological:  Negative for headaches and light-headedness.        Allergies  RX Allergies[1]     Medications  Current Outpatient Medications   Medication Instructions    albuterol 90 mcg/actuation inhaler 2 puffs, inhalation, Every 6 hours PRN    ascorbic acid (VITAMIN C) 500 mg, Daily    atropine 1 % ophthalmic solution 1 drop, Left Eye, Nightly, Please continue to administer to Left eye until seen at outpt clinic with ophthalmology this upcoming week    cyclobenzaprine (FLEXERIL) 10 mg, oral, 3 times daily PRN    enoxaparin (LOVENOX) 100 mg, subcutaneous, 2 times daily, Continue to check INR daily at home. Once INR is 2-3 STOP lovenox and continue warfarin daily    fluticasone (Flonase) 50 mcg/actuation nasal spray 2 sprays, Daily PRN    folic acid (FOLVITE) 1 mg, Daily    furosemide (LASIX) 20 mg, oral, Every other day    loratadine (CLARITIN) 10 mg, Daily PRN    " metoprolol tartrate (LOPRESSOR) 25 mg, oral, 2 times daily    multivitamin tablet 1 tablet, Daily    naloxone (NARCAN) 4 mg, nasal, As needed, May repeat every 2-3 minutes if needed, alternating nostrils, until medical assistance becomes available.    ondansetron (ZOFRAN) 4 mg, oral, Every 8 hours PRN    oxyCODONE (ROXICODONE) 10 mg, oral, Every 4 hours PRN    oxygen (O2) gas therapy 1 each, inhalation, Continuous    traZODone (DESYREL) 50 mg, oral, Nightly PRN    triamcinolone (Kenalog) 0.1 % cream Topical, 2 times daily, Apply triamcinolone 0.1% cream to both the black plaques as well as the red areas across the trunk and thighs    warfarin (COUMADIN) 5 mg, oral, Every evening    white petrolatum (Aquaphor) 41 % ointment ointment 2 Applications, Topical, 2 times daily, Apply vaseline to all eroded/denuded areas. Mepilex transfer sheets may be used for areas of friction        Past Medical History:   Past Medical History:  No date: Asthma  No date: CHF (congestive heart failure)  No date: Chronic pain disorder  02/19/2020: Compression of vein      Comment:  Superior vena cava syndrome  12/10/2020: Hypotension, unspecified   Surgical History:    Surgical History[2]   Family History:    Family History[3]  Family Oncology History:    Cancer-related family history includes Uterine cancer in an other family member.  Social History:    Social History[4]     Objective   Vitals: BP (!) 100/47   Pulse 84   Temp 36.5 °C (97.7 °F)   Resp 20   SpO2 95%   Weight: There were no vitals filed for this visit.    Physical Exam  Constitutional:       Appearance: Normal appearance.   HENT:      Head: Normocephalic and atraumatic.   Cardiovascular:      Rate and Rhythm: Normal rate and regular rhythm.   Pulmonary:      Effort: Pulmonary effort is normal.      Comments: Coarse breath sounds throughout all lung fields, no wheezing  Abdominal:      General: Abdomen is flat. Bowel sounds are normal.      Palpations: Abdomen is soft.    Musculoskeletal:         General: Normal range of motion.   Skin:     General: Skin is warm and dry.   Neurological:      General: No focal deficit present.      Mental Status: She is alert and oriented to person, place, and time.   Psychiatric:         Behavior: Behavior normal.         Diagnostic Results     Labs        Lab Results   Component Value Date    HGB 9.2 (L) 05/07/2025    HGB 9.7 (L) 04/18/2025    HGB 10.4 (L) 04/16/2025     05/07/2025     04/18/2025     04/16/2025     05/07/2025     04/16/2025     04/07/2025       Images  === 03/02/25 ===    XR CHEST 1 VIEW    - Impression -  1.  Similar hazy opacity of the left lung base compared to last chest  x-ray on 11/03/2024, again favored to represent overlying epicardial  fat pad, with a possible atelectatic component.  2. Otherwise no focal consolidation, pleural effusion, or  pneumothorax.    I personally reviewed the image(s)/study and resident interpretation.  I agree with the findings as stated by resident Diomedes Coleman.  Data analyzed and images interpreted at [facility], [place], [place].    MACRO:  None    Signed by: Sharda Kate 3/2/2025 6:15 PM  Dictation workstation:   [id number]   === 03/02/25 ===    CT HEAD WO IV CONTRAST    - Impression -  No acute intracranial abnormality.    Signed by: Marycarmen Finney 3/2/2025 5:12 PM  Dictation workstation:   [id number]     Transthoracic Echo (TTE) Complete  Result Date: 9/11/2024   [facility], [address] [place] [address]                Tel [phone] and Fax [phone] TRANSTHORACIC ECHOCARDIOGRAM REPORT  Patient Name:      KIRSTY Saenz Physician:    [id number] aFtou Gant MD Study Date:        9/11/2024            Ordering Provider:    [id number] KWAKU IVERSON  MRN/PID:           [id number]             Fellow: Accession#:        [id number]         Nurse: Date of Birth/Age: 1970 / 54 years Sonographer:          Marino Chen RDCS, RVT Gender:            F                    Additional Staff: Height:            165.10 cm            Admit Date:           9/10/2024 Weight:            109.77 kg            Admission Status:     Inpatient - STAT BSA / BMI:         2.15 m2 / 40.27      Encounter#:           [id number]                    kg/m2 Blood Pressure:    98/49 mmHg           Department Location:  [address] Study Type:    TRANSTHORACIC ECHO (TTE) COMPLETE Diagnosis/ICD: Non ST elevation (NSTEMI) myocardial infarction-I21.4 Indication:    elevated troponin, ST depression CPT Code:      Echo Complete w Full Doppler-22368 Patient History: Pertinent History: NSTEMI, cardiom,yopathy, DVT, HLD, HTN, tachycardia. Study Detail: The following Echo studies were performed: 2D, M-Mode, Doppler and               color flow. Technically challenging study due to body habitus.               Definity used as a contrast agent for endocardial border               definition. Total contrast used for this procedure was 2 mL via IV               push.  PHYSICIAN INTERPRETATION: Left Ventricle: Left ventricular ejection fraction is normal, by visual estimate at 60-65%. There are no regional left ventricular wall motion abnormalities. The left ventricular cavity size is decreased. Left ventricular diastolic filling was indeterminate. Technically difficult despite the use of echocontrast, but overall normal LV systolic function without obvoius regional wall motion abnormalities. Left Atrium: The left atrium was not well visualized. Right Ventricle: The right ventricle was not well visualized. There is reduced right ventricular systolic function. Echocontrast views of the RV suggest dilated RV with reduced systolic function.  Cannot exclude possible McConel's sign. Right Atrium: The right atrium was not well visualized. Aortic Valve: The aortic valve was not well visualized. The aortic valve dimensionless index is 0.81. There is no evidence of aortic valve regurgitation. The peak instantaneous gradient of the aortic valve is 11.0 mmHg. The mean gradient of the aortic valve is 6.0 mmHg. Mitral Valve: The mitral valve is normal in structure. There is trace mitral valve regurgitation. Tricuspid Valve: The tricuspid valve was not well visualized. There is trace tricuspid regurgitation. The right ventricular systolic pressure is unable to be estimated. Pulmonic Valve: The pulmonic valve is not well visualized. The pulmonic valve regurgitation was not well visualized. Pericardium: There is a trivial pericardial effusion. Aorta: The aortic root is normal. Systemic Veins: The inferior vena cava appears to be of normal size, IVC inspiratory collapse greater than 50%. In comparison to the previous echocardiogram(s): Note that the prior exam from 1/23/2024 the prior study demonstrated modeately enlarged RV with reduced RV systolic function. The LV systolic funciton was normal on the prior study without regional wall motion abnormalities. That study was also technically difficult.  CONCLUSIONS:  1. Poorly visualized anatomical structures due to suboptimal image quality.  2. Left ventricular ejection fraction is normal, by visual estimate at 60-65%.  3. Left ventricular diastolic filling was indeterminate.  4. Left ventricular cavity size is decreased.  5. Technically difficult despite the use of echocontrast, but overall normal LV systolic function without obvoius regional wall motion abnormalities.  6. There is reduced right ventricular systolic function.  7. Echocontrast views of the RV suggest dilated RV with reduced systolic function. Cannot exclude possible McConel's sign.  8. Note that the prior exam from 1/23/2024 the prior study demonstrated  modeately enlarged RV with reduced RV systolic function. The LV systolic funciton was normal on the prior study without regional wall motion abnormalities. That study was also technically difficult. QUANTITATIVE DATA SUMMARY:  2D MEASUREMENTS:          Normal Ranges: Ao Root d:       2.90 cm  (2.0-3.7cm) IVSd:            1.00 cm  (0.6-1.1cm) LVPWd:           0.90 cm  (0.6-1.1cm) LVIDd:           3.70 cm  (3.9-5.9cm) LVIDs:           2.40 cm LV Mass Index:   49 g/m2 LVEDV Index:     32 ml/m2 LV % FS          35.1 %  LA VOLUME:                    Normal Ranges: LA Vol A4C:        24.5 ml    (22+/-6mL/m2) LA Vol A2C:        18.6 ml LA Vol BP:         21.6 ml LA Vol Index A4C:  11.4ml/m2 LA Vol Index A2C:  8.6 ml/m2 LA Vol Index BP:   10.1 ml/m2 LA Area A4C:       11.4 cm2 LA Area A2C:       9.8 cm2 LA Major Axis A4C: 4.5 cm LA Major Axis A2C: 4.4 cm  RA VOLUME BY A/L METHOD:          Normal Ranges: RA Area A4C:             10.8 cm2  AORTA MEASUREMENTS:         Normal Ranges: Ao Sinus, d:        2.90 cm (2.1-3.5cm) Asc Ao, d:          2.70 cm (2.1-3.4cm)  LV SYSTOLIC FUNCTION BY 2D PLANIMETRY (MOD):                      Normal Ranges: EF-A4C View:    78 % (>=55%) EF-A2C View:    66 % EF-Biplane:     73 % EF-Visual:      63 % LV EF Reported: 63 %  LV DIASTOLIC FUNCTION:           Normal Ranges: MV Peak E:             0.71 m/s  (0.7-1.2 m/s) MV Peak A:             0.69 m/s  (0.42-0.7 m/s) E/A Ratio:             1.02      (1.0-2.2) MV e'                  0.073 m/s (>8.0) MV lateral e'          0.07 m/s MV medial e'           0.08 m/s E/e' Ratio:            9.63      (<8.0)  AORTIC VALVE:                      Normal Ranges: AoV Vmax:                1.66 m/s  (<=1.7m/s) AoV Peak P.0 mmHg (<20mmHg) AoV Mean P.0 mmHg  (1.7-11.5mmHg) LVOT Max Jeremy:            1.44 m/s  (<=1.1m/s) AoV VTI:                 19.00 cm  (18-25cm) LVOT VTI:                15.40 cm LVOT Diameter:           2.00 cm    (1.8-2.4cm) AoV Area, VTI:           2.55 cm2  (2.5-5.5cm2) AoV Area,Vmax:           2.73 cm2  (2.5-4.5cm2) AoV Dimensionless Index: 0.81  RIGHT VENTRICLE: RV Major 7.6 cm TAPSE:   13.4 mm RV s'    0.09 m/s  TRICUSPID VALVE/RVSP:         Normal Ranges: IVC Diam:             1.40 cm  PULMONIC VALVE:          Normal Ranges: PV Accel Time:  74 msec  (>120ms) PV Max Jeremy:     1.1 m/s  (0.6-0.9m/s) PV Max P.6 mmHg PV Mean P.0 mmHg PV VTI:         15.80 cm  [id number] Fatou Gant MD Electronically signed on 2024 at 10:20:31 AM  ** Final **     Transthoracic Echo (TTE) Limited  Result Date: 2024   [facility], [address] [place] [address]                Tel [phone] and Fax [phone] TRANSTHORACIC ECHOCARDIOGRAM REPORT  Patient Name:      KIRSTY GRAFFILL          Letty Physician: [id number] Fatou Gant MD Study Date:        2024            Ordering Provider: [id number] LEMON MRN/PID:           [id number]             Fellow:            Marco Lemon MD Accession#:        [id number]         Nurse: Date of Birth/Age: 1970 / 54 years Sonographer:       Marco Lemon MD Gender:            F                    Additional Staff: BSA / BMI:         m2 / kg/m2           Encounter#:        [id number] Study Type:    TRANSTHORACIC ECHO (TTE) LIMITED Diagnosis/ICD: Non ST elevation (NSTEMI) myocardial infarction-I21.4 Indication:    NSTEMI CPT Code:      Echo Limited-23841; Doppler Limited-93718; Color Doppler-14305  Study Detail: The following Echo studies were performed: 2D, M-Mode, Doppler and               color flow. Definity used as a contrast agent for endocardial               border definition.  PHYSICIAN INTERPRETATION: Left Ventricle: The left ventricular  systolic function is normal, with a visually estimated ejection fraction of 65-70%. There are no regional left ventricular wall motion abnormalities. The left ventricular cavity size is normal. Left ventricular diastolic filling was not assessed. Technically difficult study, however appears to have grossly normal LV systolic function without obvious regional wall motion abnormalities though not all segments were visualized. Left Atrium: The left atrium is normal in size. Left atrial appendage was not assessed. Right Ventricle: The right ventricle was not well visualized. Right ventricular systolic function not assessed. Right Atrium: The right atrium was not well visualized. Aortic Valve: The aortic valve was not well visualized. The aortic valve dimensionless index is 1.22. There is trace aortic valve regurgitation. The peak instantaneous gradient of the aortic valve is 11.4 mmHg. The mean gradient of the aortic valve is 5.0 mmHg. Mitral Valve: The mitral valve is normal in structure. There is no evidence of mitral valve regurgitation. Tricuspid Valve: The tricuspid valve was not well visualized. There is trace tricuspid regurgitation. The right ventricular systolic pressure is unable to be estimated. Pulmonic Valve: The pulmonic valve was not assessed. Pulmonic valve regurgitation was not assessed. Pericardium: There is no pericardial effusion noted. Aorta: The aortic root was not well visualized. Systemic Veins: The inferior vena cava appears to be of normal size, absent inspiratory collapse of the IVC. In comparison to the previous echocardiogram(s): Compared withthe prior exam from 1/24/2024 this study is only a limited and technically difficult exam, but the prior study showed normal LV systolic function as well ith LVEF 60-65% and no significant valvular pathology at that time. Nota that the RV function was reduced previously and not assessed today.  CONCLUSIONS:  1. Poorly visualized anatomical structures  due to suboptimal image quality.  2. The left ventricular systolic function is normal, with a visually estimated ejection fraction of 65-70%.  3. Technically difficult study, however appears to have grossly normal LV systolic function without obvious regional wall motion abnormalities though not all segments were visualized.  4. Limited fellow bedside exam.  5. Compared withthe prior exam from 2024 this study is only a limited and technically difficult exam, but the prior study showed normal LV systolic function as well ith LVEF 60-65% and no significant valvular pathology at that time. Nota that the RV function was reduced previously and not assessed today. QUANTITATIVE DATA SUMMARY:  2D MEASUREMENTS:         Normal Ranges: LAs:             3.40 cm (2.7-4.0cm) IVSd:            1.15 cm (0.6-1.1cm) LVPWd:           1.15 cm (0.6-1.1cm) LVIDd:           4.15 cm (3.9-5.9cm) LVIDs:           2.45 cm LV % FS          41.0 %  LV SYSTOLIC FUNCTION BY 2D PLANIMETRY (MOD):                      Normal Ranges: EF-A4C View:    71 % (>=55%) EF-Visual:      68 % LV EF Reported: 68 %  LV DIASTOLIC FUNCTION:          Normal Ranges: MV Peak E:             0.82 m/s (0.7-1.2 m/s) MV Peak A:             0.54 m/s (0.42-0.7 m/s) E/A Ratio:             1.54     (1.0-2.2) MV medial e'           0.13 m/s  MITRAL VALVE:          Normal Ranges: MV DT:        187 msec (150-240msec)  AORTIC VALVE:                      Normal Ranges: AoV Vmax:                1.69 m/s  (<=1.7m/s) AoV Peak P.4 mmHg (<20mmHg) AoV Mean P.0 mmHg  (1.7-11.5mmHg) LVOT Max Jeremy:            1.39 m/s  (<=1.1m/s) AoV VTI:                 20.30 cm  (18-25cm) LVOT VTI:                24.70 cm LVOT Diameter:           2.20 cm   (1.8-2.4cm) AoV Area, VTI:           4.42 cm2  (2.5-5.5cm2) AoV Area,Vmax:           2.99 cm2  (2.5-4.5cm2) AoV Dimensionless Index: 1.22  RIGHT VENTRICLE: TAPSE: 19.1 mm  TRICUSPID VALVE/RVSP:         Normal Ranges:  "IVC Diam:             1.90 cm  [id number] Fatou Gant MD Electronically signed on 9/11/2024 at 8:23:31 AM  ** Final **          Assessment/Plan   Senait Narvaez is a 55 y.o. 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 with c/o pain \"all over\" she took pain medications last night without much relief and presented to [facility] for pain management. On arrival found to be hypoxic to 82% on RA, placed on 2L O2 via NC. She was traeted with pain meds without much relief and was then admitted for pain crisis, CHARLES and hypoxia.    ACC Course  - VSS  - Hemolysis labs concerning for pain crisis  - Cr 2.75, indicating CHARLES   - Flexeril 10mg, given for AVN  - subcutaneous Dilaudid 1mg q1 hr PRX X3 doses given for sickle cell related pain  - Zofran 8mg once for opioid induced nausea/vomiting  - Benadryl 25mg once for opioid induced pruritus   - CXR shows small pleural effusion and concerns for developing pneumonia   - Ceftriaxone 1g given   - Influenza A+B/Covid negative   - Supplemental O2 2L via NC     Disposition  - Patient admitted for pain crisis, hypoxia and CHARLES, possible PNA     Leanne Gonzales PA-C         [1]   Allergies  Allergen Reactions    Vancomycin Rash    Oxycontin [Oxycodone] Drowsiness   [2]   Past Surgical History:  Procedure Laterality Date    APPENDECTOMY  08/05/2013    Appendectomy    BIOPSY  9/15/2024    CHOLECYSTECTOMY  08/05/2013    Cholecystectomy    CT ANGIO NECK  10/19/2022    CT NECK ANGIO W AND WO IV CONTRAST 10/19/2022 [facility] CLINICAL LEGACY    CT GUIDED PERCUTANEOUS BIOPSY LYMPH NODE SUPERFICIAL  9/19/2019    CT GUIDED PERCUTANEOUS BIOPSY LYMPH NODE SUPERFICIAL 9/19/2019 [facility] INPATIENT LEGACY    IR CVC " NONTUNNELED  10/26/2023    IR CVC NONTUNNELED 10/26/2023 [facility] ANGIO    IR CVC NONTUNNELED  12/7/2023    IR CVC NONTUNNELED 12/7/2023 Marcos Moser MD [facility] ANGIO    IR CVC TUNNELED  3/13/2015    IR CVC TUNNELED 3/13/2015 [facility] CLINICAL LEGACY    IR CVC TUNNELED  1/29/2016    IR CVC TUNNELED 1/29/2016 [facility] AIB LEGACY    IR CVC TUNNELED  1/17/2018    IR CVC TUNNELED 1/17/2018 [facility] AIB LEGACY    IR CVC TUNNELED  2/22/2018    IR CVC TUNNELED 2/22/2018 [facility] AIB LEGACY    IR CVC TUNNELED  3/22/2018    IR CVC TUNNELED 3/22/2018 [facility] CLINICAL LEGACY    IR CVC TUNNELED  4/18/2018    IR CVC TUNNELED 4/18/2018 [facility] AIB LEGACY    IR CVC TUNNELED  5/16/2018    IR CVC TUNNELED 5/16/2018 [facility] AIB LEGACY    IR CVC TUNNELED  6/12/2018    IR CVC TUNNELED 6/12/2018 [facility] AIB LEGACY    IR CVC TUNNELED  1/21/2020    IR CVC TUNNELED 1/21/2020 [facility] INPATIENT LEGACY    IR CVC TUNNELED  2/28/2020    IR CVC TUNNELED 2/28/2020 [facility] ANCILLARY LEGACY    IR CVC TUNNELED  4/10/2020    IR CVC TUNNELED 4/10/2020 [facility] AIB LEGACY    IR CVC TUNNELED  5/22/2020    IR CVC TUNNELED 5/22/2020 [facility] ANCILLARY LEGACY    IR CVC TUNNELED  6/19/2020    IR CVC TUNNELED 6/19/2020 [facility] AIB LEGACY    IR CVC TUNNELED  7/23/2020    IR CVC TUNNELED 7/23/2020 [facility] AIB LEGACY    IR CVC TUNNELED  9/4/2020    IR CVC TUNNELED 9/4/2020 [facility] AIB LEGACY    IR CVC TUNNELED  10/9/2020    IR CVC TUNNELED 10/9/2020 [facility] ANCILLARY LEGACY    IR CVC TUNNELED  11/13/2020    IR CVC TUNNELED 11/13/2020 [facility] AIB LEGACY    IR CVC TUNNELED  12/18/2020    IR CVC TUNNELED 12/18/2020 [facility] AIB LEGACY    IR CVC TUNNELED  1/22/2021    IR CVC TUNNELED 1/22/2021 [facility] AIB LEGACY    IR CVC TUNNELED  2/26/2021    IR CVC TUNNELED 2/26/2021 [facility] CLINICAL LEGACY    IR CVC TUNNELED  4/2/2021    IR CVC TUNNELED 4/2/2021 [facility] AIB LEGACY    IR CVC TUNNELED  5/7/2021    IR CVC TUNNELED 5/7/2021 [facility] ANCILLARY LEGACY    IR CVC TUNNELED  6/11/2021    IR CVC TUNNELED 6/11/2021 [facility] AIB LEGACY    IR CVC TUNNELED  7/16/2021     IR CVC TUNNELED 7/16/2021 [facility] ANCILLARY LEGACY    IR CVC TUNNELED  8/20/2021    IR CVC TUNNELED 8/20/2021 [facility] AIB LEGACY    IR CVC TUNNELED  9/24/2021    IR CVC TUNNELED 9/24/2021 [facility] AIB LEGACY    IR CVC TUNNELED  10/29/2021    IR CVC TUNNELED 10/29/2021 [facility] AIB LEGACY    IR CVC TUNNELED  12/3/2021    IR CVC TUNNELED 12/3/2021 [facility] AIB LEGACY    IR CVC TUNNELED  1/7/2022    IR CVC TUNNELED 1/7/2022 [facility] ANCILLARY LEGACY    IR CVC TUNNELED  2/23/2022    IR CVC TUNNELED 2/23/2022 [facility] CLINICAL LEGACY    IR CVC TUNNELED  3/25/2022    IR CVC TUNNELED 3/25/2022 [facility] ANCILLARY LEGACY    IR CVC TUNNELED  4/22/2022    IR CVC TUNNELED 4/22/2022 [facility] ANCILLARY LEGACY    IR CVC TUNNELED  6/3/2022    IR CVC TUNNELED 6/3/2022 [facility] ANCILLARY LEGACY    IR CVC TUNNELED  9/18/2017    IR CVC TUNNELED 9/18/2017 [facility] AIB LEGACY    IR CVC TUNNELED  10/30/2017    IR CVC TUNNELED 10/30/2017 [facility] AIB LEGACY    IR CVC TUNNELED  12/19/2017    IR CVC TUNNELED 12/19/2017 [facility] AIB LEGACY    IR CVC TUNNELED  1/6/2023    IR CVC TUNNELED 1/6/2023 [facility] OFFICE LEGACY    IR CVC TUNNELED  2/24/2023    IR CVC TUNNELED [facility] ANGIO    IR CVC TUNNELED  7/8/2022    IR CVC TUNNELED 7/8/2022 [facility] ANCILLARY LEGACY    IR CVC TUNNELED  8/12/2022    IR CVC TUNNELED 8/12/2022 [facility] CLINICAL LEGACY    IR CVC TUNNELED  9/16/2022    IR CVC TUNNELED 9/16/2022 [facility] ANCILLARY LEGACY    IR CVC TUNNELED  10/20/2022    IR CVC TUNNELED 10/20/2022 [facility] CLINICAL LEGACY    IR CVC TUNNELED  11/29/2022    IR CVC TUNNELED 11/29/2022 [facility] ANCILLARY LEGACY    IR CVC TUNNELED  3/31/2023    IR CVC TUNNELED [facility] ANGIO    IR CVC TUNNELED  6/9/2023    IR CVC TUNNELED 6/9/2023 [facility] ANGIO    IR CVC TUNNELED  7/20/2023    IR CVC TUNNELED 7/20/2023 [facility] ANGIO    IR CVC TUNNELED  8/16/2023    IR CVC TUNNELED 8/16/2023 [facility] ANGIO    IR CVC TUNNELED  9/21/2023    IR CVC TUNNELED 9/21/2023 [facility] ANGIO    MR HEAD ANGIO WO IV CONTRAST  8/16/2014    MR HEAD ANGIO WO IV CONTRAST 8/16/2014 [facility] ANCILLARY LEGACY     MR NECK ANGIO WO IV CONTRAST  2014    MR NECK ANGIO WO IV CONTRAST 2014 [facility] ANCILLARY LEGACY    OTHER SURGICAL HISTORY  2014    Fluid Drained, Retina Inspected: Breaks Supported By Buckle    OTHER SURGICAL HISTORY  10/09/2014    Closed Treatment Of Fracture Of The Lateral Malleolus    OTHER SURGICAL HISTORY  2014    Salpingo-oophorectomy Right Side    US GUIDED BIOPSY LYMPH NODE SUPERFICIAL  2019    US GUIDED BIOPSY LYMPH NODE SUPERFICIAL 2019 [facility] INPATIENT LEGACY   [3]   Family History  Problem Relation Name Age of Onset    Diabetes Father      Gout Father      Sickle cell trait Father      Seizures Sister      Diabetes Other      Uterine cancer Other      Other (congenital blindness) Cousin     [4]   Social History  Tobacco Use    Smoking status: Former     Current packs/day: 0.00     Types: Cigarettes     Quit date:      Years since quittin.3     Passive exposure: Past    Smokeless tobacco: Never   Substance Use Topics    Alcohol use: Not Currently    Drug use: Not Currently

## 2025-05-07 NOTE — H&P
Internal Medicine Admission Note  ==========================================================  History Of Present Illness  Senait Narvaez is a 55 y.o. 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    Patient reported developing dull-aching pain starting at her neck since last night and later getting worsened and spread to all over the body. Pain persisted until this morning. She took pain medications without much relief. Pain 8-9 out of 10. She also mentioned left-sided facial swelling. Denied chest pain, shortness of breath, fever, productive cough or palpitations. She reported no dizziness. She mentioned that she was making less urine. No blood or discomfort during urination. She visited [facility] today where she received Flexeril and dilaudid x3 doses without improvement in pain. Was found to have desaturation to 82% on RA, which improved to 93-95% on 2 L of NC. Upon arrival to general floor, she was drowsy and not visibly in pain.    Past Medical History  Medical History[1]    Surgical History  Surgical History[2]     Social History  She reports that she quit smoking about 11 years ago. Her smoking use included cigarettes. She has been exposed to tobacco smoke. She has never used smokeless tobacco. She reports that she does not currently use alcohol. She reports that she does not currently use drugs.    Family History  Family History[3]     Allergies  Vancomycin and Oxycontin [oxycodone]    Review of Systems   Constitutional:  Negative for chills and fever.   Respiratory:  Negative for cough.    Cardiovascular:  Negative  for chest pain, palpitations and leg swelling.   Gastrointestinal:  Negative for abdominal pain, diarrhea, nausea and vomiting.   Genitourinary:  Positive for decreased urine volume. Negative for dysuria and hematuria.   Musculoskeletal:  Positive for back pain and neck pain.   Neurological:  Positive for dizziness. Negative for numbness and headaches.     Physical Exam  Constitutional:       Appearance: She is obese.   Cardiovascular:      Rate and Rhythm: Normal rate and regular rhythm.      Pulses: Normal pulses.      Heart sounds: Normal heart sounds.   Pulmonary:      Effort: Pulmonary effort is normal.      Breath sounds: Normal breath sounds.   Abdominal:      General: Bowel sounds are normal.      Palpations: Abdomen is soft.   Musculoskeletal:      Right lower leg: Edema present.      Left lower leg: Edema present.   Skin:     General: Skin is warm.      Capillary Refill: Capillary refill takes less than 2 seconds.      Coloration: Skin is not jaundiced or pale.   Neurological:      General: No focal deficit present.      Mental Status: She is alert and oriented to person, place, and time.     Last Recorded Vitals  Blood pressure 102/61, pulse 78, temperature 36.3 °C (97.3 °F), temperature source Temporal, resp. rate 16, SpO2 94%.    Relevant Results   04/16/25 14:25 05/07/25 10:59   GLUCOSE 103 (H) 84   SODIUM 135 (L) 134 (L)   POTASSIUM 4.3 4.4   CHLORIDE 99 100   Bicarbonate 32 26   Anion Gap 8 (L) 12   Blood Urea Nitrogen 12 23   Creatinine 0.95 2.75 (H)   EGFR 70 20 (L)   Calcium 9.0 8.8   Albumin 3.7 3.6   Alkaline Phosphatase 131 (H) 127 (H)   ALT 9 9   AST 16 15   Bilirubin Total 1.7 (H) 2.2 (H)   Total Protein 7.3 7.1    180      04/18/25 13:34 05/07/25 10:59   WBC 8.6 11.7 (H)   nRBC 7.7 (H) 0.8 (H)   RBC 3.65 (L) 3.74 (L)   HEMOGLOBIN 9.7 (L) 9.2 (L)   HEMATOCRIT 28.7 (L) 29.5 (L)   MCV 79 (L) 79 (L)   MCH 26.6 24.6 (L)   MCHC 33.8 31.2 (L)   RED CELL DISTRIBUTION WIDTH 22.6 (H) 29.3 (H)    Platelets 168 336   Neutrophils %  75.5   Immature Granulocytes %, Automated  0.3   Lymphocytes %  17.0   Monocytes %  6.7   Eosinophils %  0.3   Basophils %  0.2   Neutrophils Absolute  8.86 (H)   RBC Morphology  See Below   Polychromasia  Mild   RBC Fragments  Few   Sickle Cells  Few   Spherocytes  Few   Target Cells  Many   Ovalocytes  Few   Teardrop Cells  Few   Hankamer Cells  Few   Pappenheimer Bodies  Present   Giant Platelets  Few   Retic %  9.5 (H)   Retic Absolute  0.356 (H)   Reticulocyte Hemoglobin  19 (L)   Immature Retic fraction  10.2   Hemoglobin A 70.0 (L)    Hemoglobin F 0.4    Hemoglobin S 13.5 (H)    Hemoglobin C 13.2 (H)    Hemoglobin A2 2.9    Hemoglobin Identification Interpretation See Below !       05/07/25 10:59   Flu A Result Not Detected   Flu B Result Not Detected   Coronavirus 2019, PCR Not Detected     XR chest 1 view  Result Date: 5/7/2025  Interpreted By:  Momo Oconnell and Liu Scott STUDY: XR CHEST 1 VIEW;  5/7/2025 12:49 pm   INDICATION: Signs/Symptoms:Hypoxic and SOB r/o PNA.     COMPARISON: Chest x-ray 03/02/2025   ACCESSION NUMBER(S): [id number]   ORDERING CLINICIAN: PENG REARDON   FINDINGS: AP radiograph of the chest was provided.       CARDIOMEDIASTINAL SILHOUETTE: Cardiomediastinal silhouette is stable in size and configuration.   LUNGS: Slight interval increase in bibasilar opacities, particularly at the right lower lung. Blunting of left costophrenic angle, unchanged from prior. No definite pneumothorax.   ABDOMEN: No remarkable upper abdominal findings.   BONES: No acute osseous changes.       1.  Bibasilar atelectasis with right basilar opacity may represent early developing consolidation versus atelectasis. 2. Small left pleural effusion, similar to prior.   I personally reviewed the images/study and I agree with the findings as stated by resident physician Main Piper MD. This study was interpreted at [facility], [place], [place].    MACRO: None   Signed by: Momo Oconnell 5/7/2025 1:24 PM Dictation workstation:   [id number]      Assessment & Plan  Senait Narvaez is a 55 y.o. 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 Endorses left facial swelling and decreased urine output. At [facility], SpO2 82% on RA with improvement on 2 L of NC. S/P IV dilaudid 1 mg x3 doses. CBC mildly leukocytosis. Cr elevated (2.75 from baseline 0.95). CXR showing new right lower lung field consolidation c/f community-acquired pneumonia.    #Acute on chronic sickle cell pain  #HgbSC SCD  #Rt femoral head AVN  :: Hgb 9.2, stable, Tbili 2.2,   :: S/P subcutaneous dilaudid 1 mg x3 doses, Flexeril 10 mg at [facility]  :: Pain score 7-8 out of 10  Plan    - Continue with pain control medications            - oxy 5 prn q 4h for moderate pain, 10 prn q4h for severe pain            - dilaudid 1 mg prn q3hr for breakthrough pain    - Continue empiric antibiotics treatment for CAP coverage  - PO Benadryl 25 mg Q 6H PRN pruritus  - Continue home Zofran PRN  - Flexeril 10 mg every 8 hours PRN  - Continue home Folate and MVI on admit  - Nita-colace/miralax for constipation prevention    #Desaturation  #New RLL opacity, c/f community-acquired pneumonia  #History of pulmonary HTN 2/2 SCD  :: Patient desaturation on RA, improving to 93-95% on 2L NC  :: Flu-A/B, COVID-19 negative  :: CBC: WBC 11.7, CXR new RLL opacity  :: Hgb stable, , Tbili 2.2, less concern of acute chest syndrome (CXR unilateral lesion)  :: S/P Ceftriaxone 1 g IV daily and azithromycin 500 mg IV at [facility]  Plan    - Continue ceftriaxone 1 g IV daily  x5 days and azithromycin 500 mg IV x3 days    - On oxygen support 2 L NC, continue pulse oximetry    - Pending urine legionella, Strep pneumoniae antigen    - Incentive spirometry    - Consider CT R/O PE once Cr improves    - Will follow INR following possible drug-drug interaction (warfarin-azithromycin)    #CHARLES, mild hyponatremia  :: Cr 2.75 from baseline 0.95, BUN 23, Na 134  :: Reported decreasing urine over past few days, still making urine, trace pedal edema bilaterally  :: Euvolemic on initial evaluation  Plan    - IV  mL, repeat RFP this evening, will continue to follow    - Send UA, urine electrolytes & random Cr on admission for FENa    - Consider fluids in the morning if hypovolemic    - Avoid nephrotoxic agents, keep euvolemia    - Renal U/S ordered to exclude post-renal CHARLES daytime    #History of recurrent VTE/PE  #Upper extremity edema  - C/F SVC given h/o SVC with clot  Plan    - Continue warfarin, will follow INR given possible drug-drug interaction with azithromycin    - Consider CTA neck once Cr improves    #HFpEF  #pSVT  :: Echo 9/11/24 LVEF 60-65%, LV cavity size decreased, reduced RV function  Plan  - Will resume every other day Lasix pending fluid/volume status and consider Lasix PRN  - Continue home Metoprolol    F: replete as needed  E: replete prn, keep K>4, Mg >2, P>3  N: regular diet  A: PIVx1    DVT ppx: on warfarin 5 mg/day (current meds)  GI ppx: -  Bowel regimen: miralax/pericolace    Full code  NOK: Tati Salgado (Mother) [phone]    Nikki Avila MD  PGY-1, Internal Medicine/Medical Genetics         [1]   Past Medical History:  Diagnosis Date    Asthma     CHF (congestive heart failure)     Chronic pain disorder     Compression of vein 02/19/2020    Superior vena cava syndrome    Hypotension, unspecified 12/10/2020   [2]   Past Surgical History:  Procedure Laterality Date    APPENDECTOMY  08/05/2013    Appendectomy    BIOPSY  9/15/2024    CHOLECYSTECTOMY   08/05/2013    Cholecystectomy    CT ANGIO NECK  10/19/2022    CT NECK ANGIO W AND WO IV CONTRAST 10/19/2022 [facility] CLINICAL LEGACY    CT GUIDED PERCUTANEOUS BIOPSY LYMPH NODE SUPERFICIAL  9/19/2019    CT GUIDED PERCUTANEOUS BIOPSY LYMPH NODE SUPERFICIAL 9/19/2019 [facility] INPATIENT LEGACY    IR CVC NONTUNNELED  10/26/2023    IR CVC NONTUNNELED 10/26/2023 [facility] ANGIO    IR CVC NONTUNNELED  12/7/2023    IR CVC NONTUNNELED 12/7/2023 Marcos Moser MD [facility] ANGIO    IR CVC TUNNELED  3/13/2015    IR CVC TUNNELED 3/13/2015 [facility] CLINICAL LEGACY    IR CVC TUNNELED  1/29/2016    IR CVC TUNNELED 1/29/2016 [facility] AIB LEGACY    IR CVC TUNNELED  1/17/2018    IR CVC TUNNELED 1/17/2018 [facility] AIB LEGACY    IR CVC TUNNELED  2/22/2018    IR CVC TUNNELED 2/22/2018 [facility] AIB LEGACY    IR CVC TUNNELED  3/22/2018    IR CVC TUNNELED 3/22/2018 [facility] CLINICAL LEGACY    IR CVC TUNNELED  4/18/2018    IR CVC TUNNELED 4/18/2018 [facility] AIB LEGACY    IR CVC TUNNELED  5/16/2018    IR CVC TUNNELED 5/16/2018 [facility] AIB LEGACY    IR CVC TUNNELED  6/12/2018    IR CVC TUNNELED 6/12/2018 [facility] AIB LEGACY    IR CVC TUNNELED  1/21/2020    IR CVC TUNNELED 1/21/2020 [facility] INPATIENT LEGACY    IR CVC TUNNELED  2/28/2020    IR CVC TUNNELED 2/28/2020 [facility] ANCILLARY LEGACY    IR CVC TUNNELED  4/10/2020    IR CVC TUNNELED 4/10/2020 [facility] AIB LEGACY    IR CVC TUNNELED  5/22/2020    IR CVC TUNNELED 5/22/2020 [facility] ANCILLARY LEGACY    IR CVC TUNNELED  6/19/2020    IR CVC TUNNELED 6/19/2020 [facility] AIB LEGACY    IR CVC TUNNELED  7/23/2020    IR CVC TUNNELED 7/23/2020 [facility] AIB LEGACY    IR CVC TUNNELED  9/4/2020    IR CVC TUNNELED 9/4/2020 [facility] AIB LEGACY    IR CVC TUNNELED  10/9/2020    IR CVC TUNNELED 10/9/2020 [facility] ANCILLARY LEGACY    IR CVC TUNNELED  11/13/2020    IR CVC TUNNELED 11/13/2020 [facility] AIB LEGACY    IR CVC TUNNELED  12/18/2020    IR CVC TUNNELED 12/18/2020 [facility] AIB LEGACY    IR CVC TUNNELED  1/22/2021    IR CVC TUNNELED 1/22/2021 [facility] AIB LEGACY    IR CVC TUNNELED  2/26/2021    IR CVC  TUNNELED 2/26/2021 [facility] CLINICAL LEGACY    IR CVC TUNNELED  4/2/2021    IR CVC TUNNELED 4/2/2021 [facility] AIB LEGACY    IR CVC TUNNELED  5/7/2021    IR CVC TUNNELED 5/7/2021 [facility] ANCILLARY LEGACY    IR CVC TUNNELED  6/11/2021    IR CVC TUNNELED 6/11/2021 [facility] AIB LEGACY    IR CVC TUNNELED  7/16/2021    IR CVC TUNNELED 7/16/2021 [facility] ANCILLARY LEGACY    IR CVC TUNNELED  8/20/2021    IR CVC TUNNELED 8/20/2021 [facility] AIB LEGACY    IR CVC TUNNELED  9/24/2021    IR CVC TUNNELED 9/24/2021 [facility] AIB LEGACY    IR CVC TUNNELED  10/29/2021    IR CVC TUNNELED 10/29/2021 [facility] AIB LEGACY    IR CVC TUNNELED  12/3/2021    IR CVC TUNNELED 12/3/2021 [facility]IB LEGACY    IR CVC TUNNELED  1/7/2022    IR CVC TUNNELED 1/7/2022 [facility] ANCILLARY LEGACY    IR CVC TUNNELED  2/23/2022    IR CVC TUNNELED 2/23/2022 [facility] CLINICAL LEGACY    IR CVC TUNNELED  3/25/2022    IR CVC TUNNELED 3/25/2022 [facility] ANCILLARY LEGACY    IR CVC TUNNELED  4/22/2022    IR CVC TUNNELED 4/22/2022 [facility] ANCILLARY LEGACY    IR CVC TUNNELED  6/3/2022    IR CVC TUNNELED 6/3/2022 [facility] ANCILLARY LEGACY    IR CVC TUNNELED  9/18/2017    IR CVC TUNNELED 9/18/2017 [facility] AIB LEGACY    IR CVC TUNNELED  10/30/2017    IR CVC TUNNELED 10/30/2017 [facility] AIB LEGACY    IR CVC TUNNELED  12/19/2017    IR CVC TUNNELED 12/19/2017 [facility] AIB LEGACY    IR CVC TUNNELED  1/6/2023    IR CVC TUNNELED 1/6/2023 [facility] OFFICE LEGACY    IR CVC TUNNELED  2/24/2023    IR CVC TUNNELED [facility] ANGIO    IR CVC TUNNELED  7/8/2022    IR CVC TUNNELED 7/8/2022 [facility] ANCILLARY LEGACY    IR CVC TUNNELED  8/12/2022    IR CVC TUNNELED 8/12/2022 [facility] CLINICAL LEGACY    IR CVC TUNNELED  9/16/2022    IR CVC TUNNELED 9/16/2022 [facility] ANCILLARY LEGACY    IR CVC TUNNELED  10/20/2022    IR CVC TUNNELED 10/20/2022 [facility] CLINICAL LEGACY    IR CVC TUNNELED  11/29/2022    IR CVC TUNNELED 11/29/2022 [facility]CILLARY LEGACY    IR CVC TUNNELED  3/31/2023    IR CVC TUNNELED [facility]IO    IR CVC TUNNELED  6/9/2023    IR CVC TUNNELED 6/9/2023 [facility]    IR CVC TUNNELED   7/20/2023    IR CVC TUNNELED 7/20/2023 [facility]IO    IR CVC TUNNELED  8/16/2023    IR CVC TUNNELED 8/16/2023 [facility]IO    IR CVC TUNNELED  9/21/2023    IR CVC TUNNELED 9/21/2023 [facility]IO    MR HEAD ANGIO WO IV CONTRAST  8/16/2014    MR HEAD ANGIO WO IV CONTRAST 8/16/2014 [facility] [place] [place] ANCILLARY LEGACY    MR NECK ANGIO WO IV CONTRAST  8/16/2014    MR NECK ANGIO WO IV CONTRAST 8/16/2014 [facility] [place] [place] ANCILLARY LEGACY    OTHER SURGICAL HISTORY  05/13/2014    Fluid Drained, Retina Inspected: Breaks Supported By Buckle    OTHER SURGICAL HISTORY  10/09/2014    Closed Treatment Of Fracture Of The Lateral Malleolus    OTHER SURGICAL HISTORY  06/09/2014    Salpingo-oophorectomy Right Side    US GUIDED BIOPSY LYMPH NODE SUPERFICIAL  9/17/2019    US GUIDED BIOPSY LYMPH NODE SUPERFICIAL 9/17/2019 [facility] [place] [place] INPATIENT LEGACY   [3]   Family History  Problem Relation Name Age of Onset    Diabetes Father      Gout Father      Sickle cell trait Father      Seizures Sister      Diabetes Other      Uterine cancer Other      Other (congenital blindness) Cousin

## 2025-05-07 NOTE — PROGRESS NOTES
Report called at 1825 to Lina PIZANO on SCC4.  Pt admitted for sickle cell pain crisis and pnemonia.  Pt was given 1 mg IV dilaudid prior to her admission for a pain score of 10.  Pt alert at time of admission.  On 2l O2 NC.  Pt taken to floor in stable condition.  Lina at bedside when pt arrived to the floor.

## 2025-05-07 NOTE — SIGNIFICANT EVENT
PMHx of Hb SCD (SC variant) w/ regular exchanges txt (most recent exchange 4/18) , chronic pain 2/2 SCD/AVN (femoral head), cardiomyopathy, pulmonary HTN iso SCD, CKD II, recurrent VTE/PE with SVC syndrome (on warfarin), CAN, nocturnal hypoxia, chronic constipation, and known L breast mass (likely benign s/p bx 1/31; follows breast clinic).    Presents as a direct admission following evaluation at [facility] where she reported pain starting at right doral “neck then spread everywhere” c/f new pain crisis. She was treated with Dilaudid 1mg x4 with improvement of her pain prior to arrival to [facility]. She also presented with a new O2 requirement (2L NC) and reported recent URI symptoms (cough/rhinorrhea) that were starting to improve. CXR revealed new RLL infiltrates with small pleural effusion. Labs notable for new leukocytosis 11. 7 and CHARLES (Cr 2.75/BUN 23). Other relevant labs (LDH wnl at 180, T bili 2.2, Retic of 9.5%).  Now S/p Zofran x1, Dilaudid, Flexeril, and Ceftriaxone/Azithro x1 given c/f PNA. Covid/Flu negative. Given Benadryl for noted swollen face.    On exam she is tired following Dilaudid administration but fully cooperative/oriented with humor. States that she does not feel dypenic and that her pain has improved. Denies any present S/S c/f local or systemic infection. Reports that she has not urinated today and has had recent decline in urination in recent days.    A/P:  54yo F with known SCD presents with generalized pain nearly resolved with IV dilaudid and new oxygen requirement iso new RLL infiltrates on CXR. PE is on the differential though less likely on active AC at home, ACS is unlikely, considered acute chest based in radiographic findings though it is reassuring patient declines recent subjective dyspnea, CP, fevers. CHARLES with c/f urinary retention; will assess with bladder scan and follow up renal US. Reported facial swelling raises c/f SVC/Jugular VTE, though contrasted study deferred given  CHARLES.    #RLL infiltrate (C/f CAP)  #AHRF (New O2 requirement)  Leukocytosis 11.7  -c/w Ceft/Azithro for now.   -Procal, sputum if able, legion/strep  -fu Blood Ctx  -Cont to wean O2 as tolerated  -c/w Folate     #SCD Pain   #Femoral Head AVN  -Tylenol scheduled, Oxy 5 and 10 q4 for moderate/severe pain, Dilaudid 1mg q3 for breakthrough  -c/w flexeril TID PRN  -Narcan PRN ordered   -Zofran PRN    #CHARLES  C/f retention as patient has not urinated in >12 hours  -Bladder scan  -Urine e-lytes/Cr  -Renal US to r/o infarct if Bladder scan unremarkable though less likely based on exam  -Fluids resuscitation based on clinical volume assessment, will likely trial 500cc bolus if above unremarkable with follow up RFP.    #Prior VTE/PE  #Facial swelling  On warfarin at home, remains adherent  non-acute on examination, no oropharyngeal compromise  -c/w warfarin, INR trend  -Consider follow up CTA neck pending CHARLES resolution. If progression of SVC or Jugular VTE would consider fu CTA PE.    #MISC  -fluticasone  -Loratidine  -Trazadone 50 QHS PRN  -Chronic/recurrent VTE (c/w home warfarin 5mg; INR goal 2-3)  -Doc/senna; Miralax for constipation  -HFpEF (gentle fluid repletion) Lasix PRN while inpatient; appears euvolemic presently. Can consider restarting home dosing every other day.      Senior Staffing Note: remainder per excellent Intern H&P.    To be formally staffed in the morning.

## 2025-05-08 ENCOUNTER — APPOINTMENT (OUTPATIENT)
Dept: RADIOLOGY | Facility: HOSPITAL | Age: 55
DRG: 871 | End: 2025-05-08
Payer: COMMERCIAL

## 2025-05-08 ENCOUNTER — APPOINTMENT (OUTPATIENT)
Dept: OTHER | Facility: HOSPITAL | Age: 55
DRG: 871 | End: 2025-05-08
Payer: COMMERCIAL

## 2025-05-08 ENCOUNTER — APPOINTMENT (OUTPATIENT)
Dept: HEMATOLOGY/ONCOLOGY | Facility: HOSPITAL | Age: 55
DRG: 871 | End: 2025-05-08
Payer: COMMERCIAL

## 2025-05-08 ENCOUNTER — APPOINTMENT (OUTPATIENT)
Dept: CARDIOLOGY | Facility: HOSPITAL | Age: 55
DRG: 871 | End: 2025-05-08
Payer: COMMERCIAL

## 2025-05-08 LAB
ABO GROUP (TYPE) IN BLOOD: NORMAL
ALBUMIN SERPL BCP-MCNC: 3 G/DL (ref 3.4–5)
ALBUMIN SERPL BCP-MCNC: 3.5 G/DL (ref 3.4–5)
ALBUMIN SERPL BCP-MCNC: 3.6 G/DL (ref 3.4–5)
ALP SERPL-CCNC: 120 U/L (ref 33–110)
ALP SERPL-CCNC: 131 U/L (ref 33–110)
ALT SERPL W P-5'-P-CCNC: 11 U/L (ref 7–45)
ALT SERPL W P-5'-P-CCNC: 11 U/L (ref 7–45)
ANION GAP BLDV CALCULATED.4IONS-SCNC: 5 MMOL/L (ref 10–25)
ANION GAP BLDV CALCULATED.4IONS-SCNC: 7 MMOL/L (ref 10–25)
ANION GAP BLDV CALCULATED.4IONS-SCNC: 9 MMOL/L (ref 10–25)
ANION GAP BLDV CALCULATED.4IONS-SCNC: ABNORMAL MMOL/L
ANION GAP SERPL CALC-SCNC: 10 MMOL/L (ref 10–20)
ANION GAP SERPL CALC-SCNC: 12 MMOL/L (ref 10–20)
ANION GAP SERPL CALC-SCNC: 12 MMOL/L (ref 10–20)
ANTIBODY SCREEN: NORMAL
AST SERPL W P-5'-P-CCNC: 17 U/L (ref 9–39)
AST SERPL W P-5'-P-CCNC: 20 U/L (ref 9–39)
BASE EXCESS BLDV CALC-SCNC: 0.1 MMOL/L (ref -2–3)
BASE EXCESS BLDV CALC-SCNC: 0.5 MMOL/L (ref -2–3)
BASE EXCESS BLDV CALC-SCNC: 1.2 MMOL/L (ref -2–3)
BASE EXCESS BLDV CALC-SCNC: 1.3 MMOL/L (ref -2–3)
BASE EXCESS BLDV CALC-SCNC: 1.6 MMOL/L (ref -2–3)
BASE EXCESS BLDV CALC-SCNC: ABNORMAL MMOL/L
BASOPHILS # BLD MANUAL: 0.1 X10*3/UL (ref 0–0.1)
BASOPHILS NFR BLD MANUAL: 0.8 %
BILIRUB DIRECT SERPL-MCNC: 0.2 MG/DL (ref 0–0.3)
BILIRUB SERPL-MCNC: 1.1 MG/DL (ref 0–1.2)
BILIRUB SERPL-MCNC: 1.2 MG/DL (ref 0–1.2)
BLOOD EXPIRATION DATE: NORMAL
BNP SERPL-MCNC: 1329 PG/ML (ref 0–99)
BODY TEMPERATURE: 37 DEGREES CELSIUS
BUN SERPL-MCNC: 26 MG/DL (ref 6–23)
BUN SERPL-MCNC: 29 MG/DL (ref 6–23)
BUN SERPL-MCNC: 30 MG/DL (ref 6–23)
CA-I BLD-SCNC: 0.99 MMOL/L (ref 1.1–1.33)
CA-I BLDV-SCNC: 1.13 MMOL/L (ref 1.1–1.33)
CA-I BLDV-SCNC: 1.14 MMOL/L (ref 1.1–1.33)
CA-I BLDV-SCNC: 1.16 MMOL/L (ref 1.1–1.33)
CA-I BLDV-SCNC: ABNORMAL MMOL/L
CALCIUM SERPL-MCNC: 7.8 MG/DL (ref 8.6–10.6)
CALCIUM SERPL-MCNC: 7.9 MG/DL (ref 8.6–10.6)
CALCIUM SERPL-MCNC: 8.3 MG/DL (ref 8.6–10.6)
CARDIAC TROPONIN I PNL SERPL HS: 310 NG/L (ref 0–34)
CARDIAC TROPONIN I PNL SERPL HS: 320 NG/L (ref 0–34)
CHLORIDE BLDV-SCNC: 100 MMOL/L (ref 98–107)
CHLORIDE BLDV-SCNC: 101 MMOL/L (ref 98–107)
CHLORIDE BLDV-SCNC: 101 MMOL/L (ref 98–107)
CHLORIDE BLDV-SCNC: ABNORMAL MMOL/L
CHLORIDE SERPL-SCNC: 100 MMOL/L (ref 98–107)
CO2 SERPL-SCNC: 28 MMOL/L (ref 21–32)
CO2 SERPL-SCNC: 28 MMOL/L (ref 21–32)
CO2 SERPL-SCNC: 30 MMOL/L (ref 21–32)
CREAT SERPL-MCNC: 2.9 MG/DL (ref 0.5–1.05)
CREAT SERPL-MCNC: 3.76 MG/DL (ref 0.5–1.05)
CREAT SERPL-MCNC: 3.86 MG/DL (ref 0.5–1.05)
DISPENSE STATUS: NORMAL
EGFRCR SERPLBLD CKD-EPI 2021: 13 ML/MIN/1.73M*2
EGFRCR SERPLBLD CKD-EPI 2021: 14 ML/MIN/1.73M*2
EGFRCR SERPLBLD CKD-EPI 2021: 18 ML/MIN/1.73M*2
EJECTION FRACTION: 63 %
EOSINOPHIL # BLD MANUAL: 0.21 X10*3/UL (ref 0–0.7)
EOSINOPHIL NFR BLD MANUAL: 1.7 %
ERYTHROCYTE [DISTWIDTH] IN BLOOD BY AUTOMATED COUNT: 21.6 % (ref 11.5–14.5)
ERYTHROCYTE [DISTWIDTH] IN BLOOD BY AUTOMATED COUNT: 29.7 % (ref 11.5–14.5)
ERYTHROCYTE [DISTWIDTH] IN BLOOD BY AUTOMATED COUNT: 30.4 % (ref 11.5–14.5)
GLUCOSE BLD MANUAL STRIP-MCNC: 77 MG/DL (ref 74–99)
GLUCOSE BLDV-MCNC: 74 MG/DL (ref 74–99)
GLUCOSE BLDV-MCNC: 85 MG/DL (ref 74–99)
GLUCOSE BLDV-MCNC: 91 MG/DL (ref 74–99)
GLUCOSE BLDV-MCNC: 97 MG/DL (ref 74–99)
GLUCOSE BLDV-MCNC: 99 MG/DL (ref 74–99)
GLUCOSE BLDV-MCNC: ABNORMAL MG/DL
GLUCOSE SERPL-MCNC: 81 MG/DL (ref 74–99)
GLUCOSE SERPL-MCNC: 88 MG/DL (ref 74–99)
GLUCOSE SERPL-MCNC: 90 MG/DL (ref 74–99)
HCO3 BLDV-SCNC: 29.3 MMOL/L (ref 22–26)
HCO3 BLDV-SCNC: 29.7 MMOL/L (ref 22–26)
HCO3 BLDV-SCNC: 30 MMOL/L (ref 22–26)
HCO3 BLDV-SCNC: 30.9 MMOL/L (ref 22–26)
HCO3 BLDV-SCNC: 31 MMOL/L (ref 22–26)
HCO3 BLDV-SCNC: ABNORMAL MMOL/L
HCT VFR BLD AUTO: 27.1 % (ref 36–46)
HCT VFR BLD AUTO: 29.9 % (ref 36–46)
HCT VFR BLD AUTO: 30.2 % (ref 36–46)
HCT VFR BLD EST: 26 % (ref 36–46)
HCT VFR BLD EST: 27 % (ref 36–46)
HCT VFR BLD EST: 27 % (ref 36–46)
HCT VFR BLD EST: 29 % (ref 36–46)
HEMOGLOBIN A2: ABNORMAL
HEMOGLOBIN A: ABNORMAL
HEMOGLOBIN C: 16 %
HEMOGLOBIN C: 22.3 %
HEMOGLOBIN C: 22.4 %
HEMOGLOBIN F: ABNORMAL
HEMOGLOBIN IDENTIFICATION INTERPRETATION: ABNORMAL
HEMOGLOBIN S: 22.6 %
HEMOGLOBIN S: 23 %
HEMOGLOBIN S: 23.5 %
HGB BLD-MCNC: 8.4 G/DL (ref 12–16)
HGB BLD-MCNC: 9.1 G/DL (ref 12–16)
HGB BLD-MCNC: 9.2 G/DL (ref 12–16)
HGB BLDV-MCNC: 8.5 G/DL (ref 12–16)
HGB BLDV-MCNC: 8.8 G/DL (ref 12–16)
HGB BLDV-MCNC: 8.8 G/DL (ref 12–16)
HGB BLDV-MCNC: 8.9 G/DL (ref 12–16)
HGB BLDV-MCNC: 8.9 G/DL (ref 12–16)
HGB BLDV-MCNC: 9.8 G/DL (ref 12–16)
HGB RETIC QN: 19 PG (ref 28–38)
HYPOCHROMIA BLD QL SMEAR: ABNORMAL
IMM GRANULOCYTES # BLD AUTO: 0.06 X10*3/UL (ref 0–0.7)
IMM GRANULOCYTES NFR BLD AUTO: 0.5 % (ref 0–0.9)
IMMATURE RETIC FRACTION: 10.6 %
INHALED O2 CONCENTRATION: 0 %
INHALED O2 CONCENTRATION: 0 %
INHALED O2 CONCENTRATION: 32 %
INHALED O2 CONCENTRATION: 36 %
INHALED O2 CONCENTRATION: 40 %
INHALED O2 CONCENTRATION: 40 %
INR PPP: 1.6 (ref 0.9–1.1)
LACTATE BLDV-SCNC: 0.5 MMOL/L (ref 0.4–2)
LACTATE BLDV-SCNC: 0.6 MMOL/L (ref 0.4–2)
LACTATE BLDV-SCNC: 0.8 MMOL/L (ref 0.4–2)
LACTATE BLDV-SCNC: 0.8 MMOL/L (ref 0.4–2)
LACTATE BLDV-SCNC: 0.9 MMOL/L (ref 0.4–2)
LACTATE BLDV-SCNC: ABNORMAL MMOL/L
LDH SERPL L TO P-CCNC: 232 U/L (ref 84–246)
LYMPHOCYTES # BLD MANUAL: 1.22 X10*3/UL (ref 1.2–4.8)
LYMPHOCYTES NFR BLD MANUAL: 10 %
MAGNESIUM SERPL-MCNC: 2.56 MG/DL (ref 1.6–2.4)
MCH RBC QN AUTO: 24 PG (ref 26–34)
MCH RBC QN AUTO: 24.3 PG (ref 26–34)
MCH RBC QN AUTO: 26.7 PG (ref 26–34)
MCHC RBC AUTO-ENTMCNC: 30.1 G/DL (ref 32–36)
MCHC RBC AUTO-ENTMCNC: 30.8 G/DL (ref 32–36)
MCHC RBC AUTO-ENTMCNC: 31 G/DL (ref 32–36)
MCV RBC AUTO: 77 FL (ref 80–100)
MCV RBC AUTO: 81 FL (ref 80–100)
MCV RBC AUTO: 87 FL (ref 80–100)
MONOCYTES # BLD MANUAL: 0.21 X10*3/UL (ref 0.1–1)
MONOCYTES NFR BLD MANUAL: 1.7 %
NEUTS SEG # BLD MANUAL: 10.47 X10*3/UL (ref 1.2–7)
NEUTS SEG NFR BLD MANUAL: 85.8 %
NRBC BLD-RTO: 0.2 /100 WBCS (ref 0–0)
NRBC BLD-RTO: 0.3 /100 WBCS (ref 0–0)
NRBC BLD-RTO: 9.2 /100 WBCS (ref 0–0)
OXYHGB MFR BLDV: 26.2 % (ref 45–75)
OXYHGB MFR BLDV: 30.6 % (ref 45–75)
OXYHGB MFR BLDV: 30.7 % (ref 45–75)
OXYHGB MFR BLDV: 45.9 % (ref 45–75)
OXYHGB MFR BLDV: 56.1 % (ref 45–75)
OXYHGB MFR BLDV: 69.5 % (ref 45–75)
PATH REVIEW-HGB IDENTIFICATION: ABNORMAL
PCO2 BLDV: 71 MM HG (ref 41–51)
PCO2 BLDV: 75 MM HG (ref 41–51)
PCO2 BLDV: 81 MM HG (ref 41–51)
PCO2 BLDV: 83 MM HG (ref 41–51)
PCO2 BLDV: 86 MM HG (ref 41–51)
PCO2 BLDV: ABNORMAL MM[HG]
PH BLDV: 7.15 PH (ref 7.33–7.43)
PH BLDV: 7.18 PH (ref 7.33–7.43)
PH BLDV: 7.19 PH (ref 7.33–7.43)
PH BLDV: 7.2 PH (ref 7.33–7.43)
PH BLDV: 7.23 PH (ref 7.33–7.43)
PH BLDV: ABNORMAL [PH]
PHOSPHATE SERPL-MCNC: 5.6 MG/DL (ref 2.5–4.9)
PHOSPHATE SERPL-MCNC: 5.8 MG/DL (ref 2.5–4.9)
PHOSPHATE SERPL-MCNC: 6.3 MG/DL (ref 2.5–4.9)
PLATELET # BLD AUTO: 153 X10*3/UL (ref 150–450)
PLATELET # BLD AUTO: 324 X10*3/UL (ref 150–450)
PLATELET # BLD AUTO: 329 X10*3/UL (ref 150–450)
PO2 BLDV: 25 MM HG (ref 35–45)
PO2 BLDV: 26 MM HG (ref 35–45)
PO2 BLDV: 26 MM HG (ref 35–45)
PO2 BLDV: 32 MM HG (ref 35–45)
PO2 BLDV: 39 MM HG (ref 35–45)
PO2 BLDV: ABNORMAL MM[HG]
POLYCHROMASIA BLD QL SMEAR: ABNORMAL
POTASSIUM BLDV-SCNC: 4.7 MMOL/L (ref 3.5–5.3)
POTASSIUM BLDV-SCNC: 4.7 MMOL/L (ref 3.5–5.3)
POTASSIUM BLDV-SCNC: 5.1 MMOL/L (ref 3.5–5.3)
POTASSIUM BLDV-SCNC: 5.2 MMOL/L (ref 3.5–5.3)
POTASSIUM BLDV-SCNC: 5.5 MMOL/L (ref 3.5–5.3)
POTASSIUM BLDV-SCNC: ABNORMAL MMOL/L
POTASSIUM SERPL-SCNC: 5.1 MMOL/L (ref 3.5–5.3)
POTASSIUM SERPL-SCNC: 5.1 MMOL/L (ref 3.5–5.3)
POTASSIUM SERPL-SCNC: 5.6 MMOL/L (ref 3.5–5.3)
PRODUCT BLOOD TYPE: 9500
PRODUCT CODE: NORMAL
PROT SERPL-MCNC: 6.8 G/DL (ref 6.4–8.2)
PROT SERPL-MCNC: 7.3 G/DL (ref 6.4–8.2)
PROTHROMBIN TIME: 17.9 SECONDS (ref 9.8–12.4)
RBC # BLD AUTO: 3.45 X10*6/UL (ref 4–5.2)
RBC # BLD AUTO: 3.5 X10*6/UL (ref 4–5.2)
RBC # BLD AUTO: 3.74 X10*6/UL (ref 4–5.2)
RBC MORPH BLD: ABNORMAL
RETICS #: 0.34 X10*6/UL (ref 0.02–0.08)
RETICS/RBC NFR AUTO: 9.6 % (ref 0.5–2)
RH FACTOR (ANTIGEN D): NORMAL
SAO2 % BLDV: 27 % (ref 45–75)
SAO2 % BLDV: 32 % (ref 45–75)
SAO2 % BLDV: 32 % (ref 45–75)
SAO2 % BLDV: 48 % (ref 45–75)
SAO2 % BLDV: 58 % (ref 45–75)
SAO2 % BLDV: 74 % (ref 45–75)
SODIUM BLDV-SCNC: 131 MMOL/L (ref 136–145)
SODIUM BLDV-SCNC: 132 MMOL/L (ref 136–145)
SODIUM BLDV-SCNC: 133 MMOL/L (ref 136–145)
SODIUM BLDV-SCNC: 133 MMOL/L (ref 136–145)
SODIUM BLDV-SCNC: 134 MMOL/L (ref 136–145)
SODIUM BLDV-SCNC: ABNORMAL MMOL/L
SODIUM SERPL-SCNC: 134 MMOL/L (ref 136–145)
SODIUM SERPL-SCNC: 135 MMOL/L (ref 136–145)
SODIUM SERPL-SCNC: 135 MMOL/L (ref 136–145)
STOMATOCYTES BLD QL SMEAR: ABNORMAL
TARGETS BLD QL SMEAR: ABNORMAL
TOTAL CELLS COUNTED BLD: 120
UFH PPP CHRO-ACNC: 0.6 IU/ML (ref ?–1.1)
UFH PPP CHRO-ACNC: 0.7 IU/ML (ref ?–1.1)
UFH PPP CHRO-ACNC: 1.5 IU/ML (ref ?–1.1)
UNIT ABO: NORMAL
UNIT NUMBER: NORMAL
UNIT RH: NORMAL
UNIT VOLUME: 282
UNIT VOLUME: 284
UNIT VOLUME: 350
WBC # BLD AUTO: 12.2 X10*3/UL (ref 4.4–11.3)
WBC # BLD AUTO: 13.8 X10*3/UL (ref 4.4–11.3)
WBC # BLD AUTO: 6.9 X10*3/UL (ref 4.4–11.3)
XM INTEP: NORMAL

## 2025-05-08 PROCEDURE — 93325 DOPPLER ECHO COLOR FLOW MAPG: CPT | Performed by: INTERNAL MEDICINE

## 2025-05-08 PROCEDURE — 82435 ASSAY OF BLOOD CHLORIDE: CPT

## 2025-05-08 PROCEDURE — 85520 HEPARIN ASSAY: CPT

## 2025-05-08 PROCEDURE — 84100 ASSAY OF PHOSPHORUS: CPT

## 2025-05-08 PROCEDURE — 36415 COLL VENOUS BLD VENIPUNCTURE: CPT

## 2025-05-08 PROCEDURE — 83615 LACTATE (LD) (LDH) ENZYME: CPT

## 2025-05-08 PROCEDURE — 93308 TTE F-UP OR LMTD: CPT | Performed by: INTERNAL MEDICINE

## 2025-05-08 PROCEDURE — 83020 HEMOGLOBIN ELECTROPHORESIS: CPT | Performed by: PATHOLOGY

## 2025-05-08 PROCEDURE — 83880 ASSAY OF NATRIURETIC PEPTIDE: CPT

## 2025-05-08 PROCEDURE — 2500000004 HC RX 250 GENERAL PHARMACY W/ HCPCS (ALT 636 FOR OP/ED)

## 2025-05-08 PROCEDURE — 85007 BL SMEAR W/DIFF WBC COUNT: CPT

## 2025-05-08 PROCEDURE — 02HV33Z INSERTION OF INFUSION DEVICE INTO SUPERIOR VENA CAVA, PERCUTANEOUS APPROACH: ICD-10-PCS | Performed by: INTERNAL MEDICINE

## 2025-05-08 PROCEDURE — 85027 COMPLETE CBC AUTOMATED: CPT

## 2025-05-08 PROCEDURE — 2500000001 HC RX 250 WO HCPCS SELF ADMINISTERED DRUGS (ALT 637 FOR MEDICARE OP)

## 2025-05-08 PROCEDURE — 36556 INSERT NON-TUNNEL CV CATH: CPT | Mod: GC | Performed by: STUDENT IN AN ORGANIZED HEALTH CARE EDUCATION/TRAINING PROGRAM

## 2025-05-08 PROCEDURE — XXE2X19 MEASUREMENT OF CARDIAC OUTPUT, COMPUTER-AIDED ASSESSMENT, NEW TECHNOLOGY GROUP 9: ICD-10-PCS | Performed by: INTERNAL MEDICINE

## 2025-05-08 PROCEDURE — 2500000005 HC RX 250 GENERAL PHARMACY W/O HCPCS: Performed by: STUDENT IN AN ORGANIZED HEALTH CARE EDUCATION/TRAINING PROGRAM

## 2025-05-08 PROCEDURE — 80069 RENAL FUNCTION PANEL: CPT | Mod: CCI

## 2025-05-08 PROCEDURE — 99221 1ST HOSP IP/OBS SF/LOW 40: CPT

## 2025-05-08 PROCEDURE — 2500000005 HC RX 250 GENERAL PHARMACY W/O HCPCS

## 2025-05-08 PROCEDURE — 36556 INSERT NON-TUNNEL CV CATH: CPT | Mod: GC

## 2025-05-08 PROCEDURE — 36512 APHERESIS RBC: CPT

## 2025-05-08 PROCEDURE — 85045 AUTOMATED RETICULOCYTE COUNT: CPT

## 2025-05-08 PROCEDURE — P9040 RBC LEUKOREDUCED IRRADIATED: HCPCS

## 2025-05-08 PROCEDURE — 99232 SBSQ HOSP IP/OBS MODERATE 35: CPT

## 2025-05-08 PROCEDURE — 2500000004 HC RX 250 GENERAL PHARMACY W/ HCPCS (ALT 636 FOR OP/ED): Mod: JZ

## 2025-05-08 PROCEDURE — 86922 COMPATIBILITY TEST ANTIGLOB: CPT

## 2025-05-08 PROCEDURE — 93325 DOPPLER ECHO COLOR FLOW MAPG: CPT

## 2025-05-08 PROCEDURE — 83021 HEMOGLOBIN CHROMOTOGRAPHY: CPT

## 2025-05-08 PROCEDURE — 82805 BLOOD GASES W/O2 SATURATION: CPT

## 2025-05-08 PROCEDURE — 2500000002 HC RX 250 W HCPCS SELF ADMINISTERED DRUGS (ALT 637 FOR MEDICARE OP, ALT 636 FOR OP/ED)

## 2025-05-08 PROCEDURE — 36556 INSERT NON-TUNNEL CV CATH: CPT

## 2025-05-08 PROCEDURE — 82947 ASSAY GLUCOSE BLOOD QUANT: CPT

## 2025-05-08 PROCEDURE — 99222 1ST HOSP IP/OBS MODERATE 55: CPT | Performed by: STUDENT IN AN ORGANIZED HEALTH CARE EDUCATION/TRAINING PROGRAM

## 2025-05-08 PROCEDURE — 6A550Z0 PHERESIS OF ERYTHROCYTES, SINGLE: ICD-10-PCS | Performed by: INTERNAL MEDICINE

## 2025-05-08 PROCEDURE — 99291 CRITICAL CARE FIRST HOUR: CPT

## 2025-05-08 PROCEDURE — 0932T N-INVS DET HRT FAIL AUG ECHO: CPT

## 2025-05-08 PROCEDURE — 93005 ELECTROCARDIOGRAM TRACING: CPT

## 2025-05-08 PROCEDURE — 93321 DOPPLER ECHO F-UP/LMTD STD: CPT | Performed by: INTERNAL MEDICINE

## 2025-05-08 PROCEDURE — 85610 PROTHROMBIN TIME: CPT

## 2025-05-08 PROCEDURE — 93010 ELECTROCARDIOGRAM REPORT: CPT | Performed by: INTERNAL MEDICINE

## 2025-05-08 PROCEDURE — 82330 ASSAY OF CALCIUM: CPT

## 2025-05-08 PROCEDURE — 94660 CPAP INITIATION&MGMT: CPT

## 2025-05-08 PROCEDURE — 82248 BILIRUBIN DIRECT: CPT

## 2025-05-08 PROCEDURE — 2020000001 HC ICU ROOM DAILY

## 2025-05-08 PROCEDURE — 86901 BLOOD TYPING SEROLOGIC RH(D): CPT

## 2025-05-08 PROCEDURE — 84484 ASSAY OF TROPONIN QUANT: CPT

## 2025-05-08 PROCEDURE — 5A09357 ASSISTANCE WITH RESPIRATORY VENTILATION, LESS THAN 24 CONSECUTIVE HOURS, CONTINUOUS POSITIVE AIRWAY PRESSURE: ICD-10-PCS | Performed by: INTERNAL MEDICINE

## 2025-05-08 PROCEDURE — 80048 BASIC METABOLIC PNL TOTAL CA: CPT

## 2025-05-08 PROCEDURE — 83735 ASSAY OF MAGNESIUM: CPT

## 2025-05-08 RX ORDER — DIPHENHYDRAMINE HCL 25 MG
25 CAPSULE ORAL ONCE
Status: COMPLETED | OUTPATIENT
Start: 2025-05-08 | End: 2025-05-08

## 2025-05-08 RX ORDER — ACETAMINOPHEN 325 MG/1
650 TABLET ORAL ONCE
Status: COMPLETED | OUTPATIENT
Start: 2025-05-08 | End: 2025-05-08

## 2025-05-08 RX ORDER — DIPHENHYDRAMINE HYDROCHLORIDE 50 MG/ML
25 INJECTION, SOLUTION INTRAMUSCULAR; INTRAVENOUS EVERY 5 MIN PRN
Status: DISCONTINUED | OUTPATIENT
Start: 2025-05-08 | End: 2025-05-08 | Stop reason: HOSPADM

## 2025-05-08 RX ORDER — CEFTRIAXONE 2 G/50ML
2 INJECTION, SOLUTION INTRAVENOUS EVERY 24 HOURS
Status: COMPLETED | OUTPATIENT
Start: 2025-05-08 | End: 2025-05-13

## 2025-05-08 RX ORDER — HEPARIN SODIUM 1000 [USP'U]/ML
1000 INJECTION, SOLUTION INTRAVENOUS; SUBCUTANEOUS ONCE
Status: COMPLETED | OUTPATIENT
Start: 2025-05-08 | End: 2025-05-08

## 2025-05-08 RX ORDER — CALCIUM GLUCONATE 20 MG/ML
2 INJECTION, SOLUTION INTRAVENOUS ONCE
Status: DISCONTINUED | OUTPATIENT
Start: 2025-05-08 | End: 2025-05-08

## 2025-05-08 RX ORDER — CALCIUM CARBONATE 200(500)MG
1500 TABLET,CHEWABLE ORAL EVERY 5 MIN PRN
Status: DISCONTINUED | OUTPATIENT
Start: 2025-05-08 | End: 2025-05-08 | Stop reason: HOSPADM

## 2025-05-08 RX ORDER — AZITHROMYCIN 500 MG/1
500 TABLET, FILM COATED ORAL
Status: COMPLETED | OUTPATIENT
Start: 2025-05-08 | End: 2025-05-10

## 2025-05-08 RX ORDER — LIDOCAINE HYDROCHLORIDE 10 MG/ML
INJECTION, SOLUTION INFILTRATION; PERINEURAL
Status: DISPENSED
Start: 2025-05-08 | End: 2025-05-09

## 2025-05-08 RX ORDER — HEPARIN SODIUM 10000 [USP'U]/100ML
0-4500 INJECTION, SOLUTION INTRAVENOUS CONTINUOUS
Status: DISCONTINUED | OUTPATIENT
Start: 2025-05-08 | End: 2025-05-14

## 2025-05-08 RX ORDER — HYDROMORPHONE HYDROCHLORIDE 1 MG/ML
1 INJECTION, SOLUTION INTRAMUSCULAR; INTRAVENOUS; SUBCUTANEOUS ONCE
Status: COMPLETED | OUTPATIENT
Start: 2025-05-08 | End: 2025-05-08

## 2025-05-08 RX ADMIN — HEPARIN SODIUM 1500 UNITS/HR: 10000 INJECTION, SOLUTION INTRAVENOUS at 17:35

## 2025-05-08 RX ADMIN — FOLIC ACID 1 MG: 1 TABLET ORAL at 08:42

## 2025-05-08 RX ADMIN — OXYCODONE 10 MG: 5 TABLET ORAL at 15:05

## 2025-05-08 RX ADMIN — SODIUM ZIRCONIUM CYCLOSILICATE 10 G: 10 POWDER, FOR SUSPENSION ORAL at 21:17

## 2025-05-08 RX ADMIN — AZITHROMYCIN DIHYDRATE 500 MG: 500 TABLET ORAL at 10:43

## 2025-05-08 RX ADMIN — CETIRIZINE HYDROCHLORIDE 10 MG: 10 TABLET, FILM COATED ORAL at 08:43

## 2025-05-08 RX ADMIN — SODIUM ZIRCONIUM CYCLOSILICATE 10 G: 10 POWDER, FOR SUSPENSION ORAL at 03:53

## 2025-05-08 RX ADMIN — Medication 5 L/MIN: at 20:18

## 2025-05-08 RX ADMIN — DIPHENHYDRAMINE HYDROCHLORIDE 25 MG: 25 CAPSULE ORAL at 14:56

## 2025-05-08 RX ADMIN — HEPARIN SODIUM 1800 UNITS/HR: 10000 INJECTION, SOLUTION INTRAVENOUS at 01:44

## 2025-05-08 RX ADMIN — ACETAMINOPHEN 650 MG: 325 TABLET ORAL at 14:55

## 2025-05-08 RX ADMIN — SODIUM ZIRCONIUM CYCLOSILICATE 10 G: 10 POWDER, FOR SUSPENSION ORAL at 10:43

## 2025-05-08 RX ADMIN — PERFLUTREN 1 ML OF DILUTION: 6.52 INJECTION, SUSPENSION INTRAVENOUS at 08:07

## 2025-05-08 RX ADMIN — HEPARIN SODIUM 1000 UNITS: 1000 INJECTION INTRAVENOUS; SUBCUTANEOUS at 17:18

## 2025-05-08 RX ADMIN — HYDROMORPHONE HYDROCHLORIDE 1 MG: 1 INJECTION, SOLUTION INTRAMUSCULAR; INTRAVENOUS; SUBCUTANEOUS at 12:46

## 2025-05-08 RX ADMIN — OXYCODONE 10 MG: 5 TABLET ORAL at 08:43

## 2025-05-08 RX ADMIN — OXYCODONE HYDROCHLORIDE AND ACETAMINOPHEN 500 MG: 500 TABLET ORAL at 08:43

## 2025-05-08 RX ADMIN — SENNOSIDES AND DOCUSATE SODIUM 2 TABLET: 50; 8.6 TABLET ORAL at 08:43

## 2025-05-08 RX ADMIN — CEFTRIAXONE SODIUM 2 G: 2 INJECTION, SOLUTION INTRAVENOUS at 20:10

## 2025-05-08 RX ADMIN — CALCIUM GLUCONATE 25 ML/HR: 20 INJECTION, SOLUTION INTRAVENOUS at 15:45

## 2025-05-08 RX ADMIN — ATROPINE SULFATE 1 DROP: 10 SOLUTION/ DROPS OPHTHALMIC at 21:17

## 2025-05-08 ASSESSMENT — PAIN SCALES - GENERAL
PAINLEVEL_OUTOF10: 8
PAINLEVEL_OUTOF10: 5 - MODERATE PAIN
PAINLEVEL_OUTOF10: 0 - NO PAIN
PAINLEVEL_OUTOF10: 0 - NO PAIN
PAINLEVEL_OUTOF10: 8

## 2025-05-08 ASSESSMENT — PAIN - FUNCTIONAL ASSESSMENT
PAIN_FUNCTIONAL_ASSESSMENT: 0-10

## 2025-05-08 ASSESSMENT — PAIN DESCRIPTION - LOCATION
LOCATION: GENERALIZED
LOCATION: GENERALIZED

## 2025-05-08 ASSESSMENT — ACTIVITIES OF DAILY LIVING (ADL): LACK_OF_TRANSPORTATION: NO

## 2025-05-08 NOTE — CONSULTS
Name: Senait Narvaez  MRN: [id number]  Encounter Date: 5/8/2025  PCP: No Assigned PCP Generic Provider, MD  Heme-Onc: Anim    Reason for consult: sickle cell c/f ACS  Attending Provider: Gilberto Gastelum    Hematology/ Oncology Consult Note      History of Present Illness   Senait Narvaez is a 55 y.o. 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 On presentation was found to be hypoxic to 82% on RA and was placed on 2L NC. CXR shows consolidation area in RLL. She was admitted and started on Abx for CAP. Labs also notable for CHARLES. A RR was called overnight for hypotension and subsequent transfer to MICU. Labs notable for hypercapnia and worsening renal injury.     On evaluation this morning patient is on 3L NC. She states that she has generalized body pain. Reports pain in her back with deep breathing. Denies overt chest pain or shortness of breath. Has mild cough productive of clear/white sputum.      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  She is on chronic opioid therapy.  Encounter for DMT with chronic full automated red cell exchange transfusion, indication being multisystem organ failure (renal, liver, respiratory failure, and NSTEMI).  Exchange transfusion is every 6 weeks  History of recurrent arrhythmia including SVT, managed with valsalva maneuvers.  This has been well-controlled  Recurrent VTE/PE with SVC syndrome, on lifelong anticoagulation with warfarin   CAN and nocturnal hypoxia on night time supplemental oxygen.   Chronic constipation    Past Medical history   Medical History[1]      Past Surgical History   Surgical History[2]      Family History    Family History[3]      Social History   Social History[4]      Allergies   Allergies[5]    Medications   acetaminophen, 650 mg, Once  acetaminophen, 975 mg, q8h  ascorbic  "acid, 500 mg, Daily  atropine, 1 drop, Nightly  azithromycin, 500 mg, q24h BEATA  cefTRIAXone, 2 g, q24h  cetirizine, 10 mg, Daily  diphenhydrAMINE, 25 mg, Once  folic acid, 1 mg, Daily  [Held by provider] furosemide, 20 mg, Every other day  heparin, 1,000 Units, Once  heparin, 1,000 Units, Once  lidocaine, ,   [Held by provider] metoprolol tartrate, 25 mg, BID  oxygen, , Continuous - Inhalation  sennosides-docusate sodium, 2 tablet, BID  sodium zirconium cyclosilicate, 10 g, q8h  [Held by provider] warfarin, 5 mg, Once per day on Sunday Monday Wednesday Thursday Friday Saturday  [Held by provider] warfarin, 7.5 mg, Every Tuesday      calcium gluconate 2 g in 100 mL infusion  heparin, Last Rate: Stopped (05/08/25 1248)      albuterol, 2 puff, q6h PRN  alteplase, 2 mg, PRN  calcium carbonate, 1,500 mg, q5 min PRN  cyclobenzaprine, 10 mg, TID PRN  diphenhydrAMINE, 25 mg, q5 min PRN  fluticasone, 2 spray, Daily PRN  heparin, 3,000-6,000 Units, q4h PRN  lidocaine, ,   naloxone, 0.2 mg, q5 min PRN  ondansetron, 4 mg, q8h PRN  oxyCODONE, 10 mg, q4h PRN  oxyCODONE, 5 mg, q4h PRN  oxygen, , Continuous PRN - O2/gases  sodium chloride, 500 mL, Once PRN        Review of Systems   Review of Systems   10 pt ROS reviewed and negative aside from above    Physical Exam   Blood pressure 91/50, pulse 75, temperature 36.7 °C (98.1 °F), resp. rate 14, height 1.651 m (5' 5\"), weight 99 kg (218 lb 4.1 oz), SpO2 93%.    Gen: awake, alert, in no acute distress  HEENT: AT/NC, PEERL, EOMI  CV: RRR, no m/r/g  Pulm: +wheeze  Abd: soft, NT/ND  Ext: no LE edema  Skin: warm and dry  Neuro: A&Ox4, moves all 4 extremities spontaneously     Labs     Lab Results   Component Value Date    GLUCOSE 90 05/08/2025    CALCIUM 7.9 (L) 05/08/2025     (L) 05/08/2025    K 5.1 05/08/2025    CO2 28 05/08/2025     05/08/2025    BUN 29 (H) 05/08/2025    CREATININE 3.86 (H) 05/08/2025       Lab Results   Component Value Date    WBC 12.2 (H) 05/08/2025    " HGB 8.4 (L) 05/08/2025    HCT 27.1 (L) 05/08/2025    MCV 77 (L) 05/08/2025     05/08/2025       Lab Results   Component Value Date    ALT 11 05/08/2025    AST 17 05/08/2025    ALKPHOS 120 (H) 05/08/2025    BILITOT 1.1 05/08/2025       Imaging   XR CHEST 1 VIEW; 5/7/2025 12:49 pm   IMPRESSION:  1.  Bibasilar atelectasis with right basilar opacity may represent  early developing consolidation versus atelectasis.  2. Small left pleural effusion, similar to prior.    Assessment/Plan     Senait Narvaez is a 55 y.o. 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 Concern for ACS given hypoxia requiring supplemental oxygen and infiltrate seen on CXR. There is also concern for ongoing multiorgan failure - currently with worsening renal injury. Red cell exchange transfusion is warranted.    Recommendations  -transfusion medicine consulted and will proceed with RCEx today while in MICU  -daily sickle cell labs: CBC, CMP, LDH & reticulocyte count  -recommend cardiology consult for assistance with cardiomyopathy      Thank you for this consult, we will continue to follow. Patient seen and discussed with attending physician, Dr. Whaley, who agrees with the above.     Ute Miller MD  Hematology-Oncology Fellow, PGY5  Hematology Consult Pager: [phone]  Oncology Consult Pager: [phone]                  [1]   Past Medical History:  Diagnosis Date    Asthma     CHF (congestive heart failure)     Chronic pain disorder     Compression of vein 02/19/2020    Superior vena cava syndrome    Hypotension, unspecified 12/10/2020   [2]   Past Surgical History:  Procedure Laterality Date    APPENDECTOMY  08/05/2013    Appendectomy    BIOPSY  9/15/2024    CHOLECYSTECTOMY  08/05/2013    Cholecystectomy    CT ANGIO NECK  10/19/2022    CT NECK ANGIO W AND WO IV CONTRAST 10/19/2022 [facility]  CLINICAL LEGACY    CT GUIDED PERCUTANEOUS BIOPSY LYMPH NODE SUPERFICIAL  9/19/2019    CT GUIDED PERCUTANEOUS BIOPSY LYMPH NODE SUPERFICIAL 9/19/2019 [facility] [place]    IR CVC NONTUNNELED  10/26/2023    IR CVC NONTUNNELED 10/26/2023 [facility] ANGIO    IR CVC NONTUNNELED  12/7/2023    IR CVC NONTUNNELED 12/7/2023 Marcos Moser MD [facility] ANGIO    IR CVC TUNNELED  3/13/2015    IR CVC TUNNELED 3/13/2015 [facility] CLINICAL LEGACY    IR CVC TUNNELED  1/29/2016    IR CVC TUNNELED 1/29/2016 [facility] AIB LEGACY    IR CVC TUNNELED  1/17/2018    IR CVC TUNNELED 1/17/2018 [facility] AIB LEGACY    IR CVC TUNNELED  2/22/2018    IR CVC TUNNELED 2/22/2018 [facility] AIB LEGACY    IR CVC TUNNELED  3/22/2018    IR CVC TUNNELED 3/22/2018 [facility] CLINICAL LEGACY    IR CVC TUNNELED  4/18/2018    IR CVC TUNNELED 4/18/2018 [facility] AIB LEGACY    IR CVC TUNNELED  5/16/2018    IR CVC TUNNELED 5/16/2018 [facility] AIB LEGACY    IR CVC TUNNELED  6/12/2018    IR CVC TUNNELED 6/12/2018 [facility] AIB LEGACY    IR CVC TUNNELED  1/21/2020    IR CVC TUNNELED 1/21/2020 [facility] INPATIENT LEGACY    IR CVC TUNNELED  2/28/2020    IR CVC TUNNELED 2/28/2020 [facility] ANCILLARY LEGACY    IR CVC TUNNELED  4/10/2020    IR CVC TUNNELED 4/10/2020 [facility] AIB LEGACY    IR CVC TUNNELED  5/22/2020    IR CVC TUNNELED 5/22/2020 [facility] ANCILLARY LEGACY    IR CVC TUNNELED  6/19/2020    IR CVC TUNNELED 6/19/2020 [facility] AIB LEGACY    IR CVC TUNNELED  7/23/2020    IR CVC TUNNELED 7/23/2020 [facility] AIB LEGACY    IR CVC TUNNELED  9/4/2020    IR CVC TUNNELED 9/4/2020 [facility] AIB LEGACY    IR CVC TUNNELED  10/9/2020    IR CVC TUNNELED 10/9/2020 [facility] ANCILLARY LEGACY    IR CVC TUNNELED  11/13/2020    IR CVC TUNNELED 11/13/2020 [facility] AIB LEGACY    IR CVC TUNNELED  12/18/2020    IR CVC TUNNELED 12/18/2020 [facility] AIB LEGACY    IR CVC TUNNELED  1/22/2021    IR CVC TUNNELED 1/22/2021 [facility] AIB LEGACY    IR CVC TUNNELED  2/26/2021    IR CVC TUNNELED 2/26/2021 [facility] CLINICAL LEGACY    IR CVC TUNNELED  4/2/2021    IR CVC TUNNELED 4/2/2021 [facility] AIB LEGACY    IR  CVC TUNNELED  5/7/2021    IR CVC TUNNELED 5/7/2021 [facility] ANCILLARY LEGACY    IR CVC TUNNELED  6/11/2021    IR CVC TUNNELED 6/11/2021 [facility] AIB LEGACY    IR CVC TUNNELED  7/16/2021    IR CVC TUNNELED 7/16/2021 [facility] ANCILLARY LEGACY    IR CVC TUNNELED  8/20/2021    IR CVC TUNNELED 8/20/2021 [facility] AIB LEGACY    IR CVC TUNNELED  9/24/2021    IR CVC TUNNELED 9/24/2021 [facility] AIB LEGACY    IR CVC TUNNELED  10/29/2021    IR CVC TUNNELED 10/29/2021 [facility] AIB LEGACY    IR CVC TUNNELED  12/3/2021    IR CVC TUNNELED 12/3/2021 [facility] AIB LEGACY    IR CVC TUNNELED  1/7/2022    IR CVC TUNNELED 1/7/2022 [facility] ANCILLARY LEGACY    IR CVC TUNNELED  2/23/2022    IR CVC TUNNELED 2/23/2022 [facility] CLINICAL LEGACY    IR CVC TUNNELED  3/25/2022    IR CVC TUNNELED 3/25/2022 [facility] ANCILLARY LEGACY    IR CVC TUNNELED  4/22/2022    IR CVC TUNNELED 4/22/2022 [facility] ANCILLARY LEGACY    IR CVC TUNNELED  6/3/2022    IR CVC TUNNELED 6/3/2022 [facility] ANCILLARY LEGACY    IR CVC TUNNELED  9/18/2017    IR CVC TUNNELED 9/18/2017 [facility] AIB LEGACY    IR CVC TUNNELED  10/30/2017    IR CVC TUNNELED 10/30/2017 [facility] AIB LEGACY    IR CVC TUNNELED  12/19/2017    IR CVC TUNNELED 12/19/2017 [facility] AIB LEGACY    IR CVC TUNNELED  1/6/2023    IR CVC TUNNELED 1/6/2023 [facility] OFFICE LEGACY    IR CVC TUNNELED  2/24/2023    IR CVC TUNNELED [facility] ANGIO    IR CVC TUNNELED  7/8/2022    IR CVC TUNNELED 7/8/2022 [facility] ANCILLARY LEGACY    IR CVC TUNNELED  8/12/2022    IR CVC TUNNELED 8/12/2022 [facility] [place] CLINICAL LEGACY    IR CVC TUNNELED  9/16/2022    IR CVC TUNNELED 9/16/2022 [facility] ANCILLARY LEGACY    IR CVC TUNNELED  10/20/2022    IR CVC TUNNELED 10/20/2022 [facility] [place] CLINICAL LEGACY    IR CVC TUNNELED  11/29/2022    IR CVC TUNNELED 11/29/2022 [facility] ANCILLARY LEGACY    IR CVC TUNNELED  3/31/2023    IR CVC TUNNELED [facility] ANGIO    IR CVC TUNNELED  6/9/2023    IR CVC TUNNELED 6/9/2023 [facility] ANGIO    IR CVC TUNNELED  7/20/2023    IR CVC TUNNELED 7/20/2023 [facility] ANGIO    IR CVC TUNNELED  8/16/2023    IR CVC TUNNELED 8/16/2023 [facility] ANGIO     IR CVC TUNNELED  2023    IR CVC TUNNELED 2023 [facility] ANGIO    MR HEAD ANGIO WO IV CONTRAST  2014    MR HEAD ANGIO WO IV CONTRAST 2014 [facility] ANCILLARY LEGACY    MR NECK ANGIO WO IV CONTRAST  2014    MR NECK ANGIO WO IV CONTRAST 2014 [facility] ANCILLARY LEGACY    OTHER SURGICAL HISTORY  2014    Fluid Drained, Retina Inspected: Breaks Supported By Buckle    OTHER SURGICAL HISTORY  10/09/2014    Closed Treatment Of Fracture Of The Lateral Malleolus    OTHER SURGICAL HISTORY  2014    Salpingo-oophorectomy Right Side    US GUIDED BIOPSY LYMPH NODE SUPERFICIAL  2019    US GUIDED BIOPSY LYMPH NODE SUPERFICIAL 2019 [facility] INPATIENT LEGACY   [3]   Family History  Problem Relation Name Age of Onset    Diabetes Father      Gout Father      Sickle cell trait Father      Seizures Sister      Diabetes Other      Uterine cancer Other      Other (congenital blindness) Cousin     [4]   Social History  Socioeconomic History    Marital status: Single   Tobacco Use    Smoking status: Former     Current packs/day: 0.00     Types: Cigarettes     Quit date:      Years since quittin.3     Passive exposure: Past    Smokeless tobacco: Never   Substance and Sexual Activity    Alcohol use: Not Currently    Drug use: Not Currently     Social Drivers of Health     Financial Resource Strain: Low Risk  (2025)    Overall Financial Resource Strain (CARDIA)     Difficulty of Paying Living Expenses: Not hard at all   Food Insecurity: No Food Insecurity (2025)    Hunger Vital Sign     Worried About Running Out of Food in the Last Year: Never true     Ran Out of Food in the Last Year: Never true   Transportation Needs: No Transportation Needs (2025)    PRAPARE - Transportation     Lack of Transportation (Medical): No     Lack of Transportation (Non-Medical): No   Physical Activity: Patient Declined (2025)    Exercise Vital Sign     Days of Exercise per Week: Patient declined     Minutes of  Exercise per Session: Patient declined   Social Connections: Feeling Socially Integrated (12/5/2024)    OASIS : Social Isolation     Frequency of experiencing loneliness or isolation: Never   Intimate Partner Violence: Not At Risk (5/7/2025)    Humiliation, Afraid, Rape, and Kick questionnaire     Fear of Current or Ex-Partner: No     Emotionally Abused: No     Physically Abused: No     Sexually Abused: No   Housing Stability: Low Risk  (5/8/2025)    Housing Stability Vital Sign     Unable to Pay for Housing in the Last Year: No     Number of Times Moved in the Last Year: 0     Homeless in the Last Year: No   [5]   Allergies  Allergen Reactions    Vancomycin Rash    Oxycontin [Oxycodone] Drowsiness

## 2025-05-08 NOTE — HOSPITAL COURSE
Senait Narvaez is a 55-year-old female with complex medical history including HbSC sickle cell disease (on regular exchange transfusions), cardiomyopathy, pulmonary hypertension, CKD II, SVC syndrome with history of VTE/PE on warfarin, and CAN, nocturnal hypoxia, chronic constipation, and known L breast mass (likely benign s/p bx 1/31; follows breast clinic) , who was admitted for generalized pain and desaturation.    She initially presented with progressive neck and body pain, facial swelling, and hypoxia (sat 82% on RA). Workup in the ED revealed a new right lower lobe infiltrate and leukocytosis concerning for community-acquired pneumonia; ceftriaxone and azithromycin were started. She was also found to have acute kidney injury (Cr 2.75 from baseline 1.2), subtherapeutic INR of 1.5, and presumed pain crisis.    Overnight of 5/8, a rapid response was called for hypotension (BP 76/52). VBG showed severe respiratory acidosis (pH 7.21, pCO? 69) with lactate 1.0. She had received metoprolol and opioids prior to the event. Given history of thromboembolism, a heparin drip was started, and a DVT ultrasound and stat TTE were ordered. She was transferred to the MICU for further management.    In the MICU, she remained hemodynamically stable and alert. She continued to report diffuse pain, and pleuritic chest discomfort. Imaging and clinical status were consistent with acute chest syndrome. She developed progressive hypercapnia with respiratory acidosis (pH lynn 7.15, CO? 86), which improved with BiPAP therapy. She remains on BiPAP as needed for ventilatory support. Exchange transfusion was indicated and transfusion medicine was consulted. A femoral catheter was placed and RCE was performed. Nephrology was consulted for worsening renal function.

## 2025-05-08 NOTE — SIGNIFICANT EVENT
A Trialysis line placed on the right common femoral by Concepcion Schaffer MD and is ready to use. Please see post procedure note.    Leonid Whiting MD

## 2025-05-08 NOTE — CARE PLAN
Problem: Pain - Adult  Goal: Verbalizes/displays adequate comfort level or baseline comfort level  Outcome: Progressing     Problem: Safety - Adult  Goal: Free from fall injury  Outcome: Progressing     Problem: Discharge Planning  Goal: Discharge to home or other facility with appropriate resources  Outcome: Progressing     Problem: Chronic Conditions and Co-morbidities  Goal: Patient's chronic conditions and co-morbidity symptoms are monitored and maintained or improved  Outcome: Progressing     Problem: Nutrition  Goal: Nutrient intake appropriate for maintaining nutritional needs  Outcome: Progressing     Problem: Fall/Injury  Goal: Be free from injury by end of the shift  Outcome: Progressing     Problem: Pain  Goal: Free from opioid side effects throughout the shift  Outcome: Progressing     Problem: Skin  Goal: Participates in plan/prevention/treatment measures  Outcome: Progressing  Flowsheets (Taken 5/8/2025 1959)  Participates in plan/prevention/treatment measures: Elevate heels  Goal: Prevent/manage excess moisture  Outcome: Progressing  Flowsheets (Taken 5/8/2025 1959)  Prevent/manage excess moisture: Monitor for/manage infection if present  Goal: Prevent/minimize sheer/friction injuries  Outcome: Progressing  Flowsheets (Taken 5/8/2025 1959)  Prevent/minimize sheer/friction injuries:   Use pull sheet   Turn/reposition every 2 hours/use positioning/transfer devices  Goal: Promote/optimize nutrition  Outcome: Progressing  Flowsheets (Taken 5/8/2025 1959)  Promote/optimize nutrition: Monitor/record intake including meals  Goal: Promote skin healing  Outcome: Progressing  Flowsheets (Taken 5/8/2025 1959)  Promote skin healing:   Turn/reposition every 2 hours/use positioning/transfer devices   Protective dressings over bony prominences

## 2025-05-08 NOTE — PROGRESS NOTES
Senait [place] [id number]       Procedure type: Red cell Exchange    Replacement Fluids: 6   units of PRBC used for procedure (RBCX)     Procedure Completed Without complications.    Attending notified of completion status. See flow sheet(s) for additional details.  Post-Vitals listed below.    Post-procedure vitals:  1718  BP: (P) 106/54  Temp: (P) 36.6 °C (97.9 °F)  Heart Rate: (P) 76  Resp: (P) 16  SpO2: (P) 99 % 4L NC       DIAMOND MCALLISTER RN

## 2025-05-08 NOTE — SIGNIFICANT EVENT
Rapid Response Note    Rapid Response CC: hypotension    Senait Narvaez is a 55 y.o. female with PMH including sickle cell, Hx VTE/PE, pHTN, CAN, L eye vitreous hemorrhage (s/p panretinal photocoagulation x2) who presented originally for sickle cell pain crisis and pneumonia, currently being managed with antibiotics and IV pain meds. Also found to have severe CHARLES on admission s/p IV fluids. Rapid response called for hypotension.    On approach patient is somnolent, Aox2 (person and place). She rouses to voice but falls asleep after answering questions. She denies pain currently, as well as n/v/f/c, chest pain, abdominal pain. No acute concerns at this time. Of note she did receive metoprolol, 500 ml LR, and IV dilaudid roughly 3 hours prior to rapid.     Vitals:  HR 66, BP 76/52 L arm, 83/57 R arm satting 100 % on 3L     Physical Exam   Constitutional: No acute distress, somnolent but rouses to voice  HEENT: No conjunctival icterus, EOMI grossly intact, L eye cloudy and with dilated pupil  Cardiovascular: RRR, no murmurs noted  Pulmonary: Diminished in bilateral bases, no increased work of breathing on 3L  GI: Soft, non-tender, non-distended  Lower extremities: Warm and well perfused, no lower extremity edema  Neuro: A&O x2, able to move all 4 extremities spontaneously    Assessment/Plan:  Briefly, this is a 56 y/o F with PMH including sickle cell, pHTN, Hx VTE/PE admitted for pain crisis and pneumonia. Rapid called for hypotension.    #Hypotension  -BP trend during rapid response with MAPs 60 -> 65, checked in both arms  -BP appears to have downtrended shortly after receiving PO metoprolol, IV fluids, and dilaudid  -of note she also has pHTN, potential concern for acute chest syndrome, she also has a history of PE on warfarin but has subtherapeutic INR of 1.5.  -VBG during rapid with pH 7.21 / pCO2 69 / pO2 49 with lactate 1.0    Plan:  -start heparin gtt given her history of Dvt/PE and subtherapeutic INR  -sending  additional labs including BNP, troponin, T+S, CBC, RFP, hepatic panel  -echo  -DVT US  -discussed with MICU, will continue to monitor on the floor  -holding off on additional fluids for now    Disposition: remain on floor    Plan discussed with RR team and bedside nurse.     Addendum: Reviewed rapid response labs, on reassessment patient remains somnolent. Discussed with MICU fellow and plan to transfer to MICU for further management.

## 2025-05-08 NOTE — PROGRESS NOTES
Occupational Therapy                 Therapy Communication Note    Patient Name: Senait Narvaez  MRN: [id number]  Department: [facility]  Room: 02/02-A  Today's Date: 5/8/2025     Discipline: Occupational Therapy    OT Missed Visit: Yes     Missed Visit Reason: Missed Visit Reason:  (Patient pending transfusion RCE. Hold OT at this time.)    Missed Time: Attempt 1342    Comment:

## 2025-05-08 NOTE — CARE PLAN
Problem: Pain - Adult  Goal: Verbalizes/displays adequate comfort level or baseline comfort level  5/8/2025 0552 by Darrel Pearson RN  Outcome: Progressing  5/8/2025 0552 by Darrel Pearson RN  Outcome: Progressing     Problem: Safety - Adult  Goal: Free from fall injury  5/8/2025 0552 by Darrel Pearson RN  Outcome: Progressing  5/8/2025 0552 by Darrel Pearson RN  Outcome: Progressing     Problem: Discharge Planning  Goal: Discharge to home or other facility with appropriate resources  5/8/2025 0552 by Darrel Pearson RN  Outcome: Progressing  5/8/2025 0552 by Darrel Pearson RN  Outcome: Progressing     Problem: Chronic Conditions and Co-morbidities  Goal: Patient's chronic conditions and co-morbidity symptoms are monitored and maintained or improved  5/8/2025 0552 by Darrle Pearson RN  Outcome: Progressing  5/8/2025 0552 by Darrel Pearson RN  Outcome: Progressing     Problem: Nutrition  Goal: Nutrient intake appropriate for maintaining nutritional needs  5/8/2025 0552 by Darrel Pearson RN  Outcome: Progressing  5/8/2025 0552 by Darrel Pearson RN  Outcome: Progressing     Problem: Fall/Injury  Goal: Not fall by end of shift  5/8/2025 0552 by Darrel Pearson RN  Outcome: Progressing  5/8/2025 0552 by Darrel Pearson RN  Outcome: Progressing     Problem: Pain  Goal: Free from opioid side effects throughout the shift  Outcome: Progressing     Problem: Skin  Goal: Participates in plan/prevention/treatment measures  Outcome: Progressing  Flowsheets (Taken 5/8/2025 0552)  Participates in plan/prevention/treatment measures: Elevate heels  Goal: Prevent/manage excess moisture  Outcome: Progressing  Flowsheets (Taken 5/8/2025 0552)  Prevent/manage excess moisture: Monitor for/manage infection if present  Goal: Prevent/minimize sheer/friction injuries  Outcome: Progressing  Flowsheets (Taken 5/8/2025 0552)  Prevent/minimize sheer/friction injuries:   Use pull sheet   Turn/reposition every 2  hours/use positioning/transfer devices  Goal: Promote/optimize nutrition  Outcome: Progressing  Flowsheets (Taken 5/8/2025 0552)  Promote/optimize nutrition: Monitor/record intake including meals  Goal: Promote skin healing  Outcome: Progressing  Flowsheets (Taken 5/8/2025 0552)  Promote skin healing:   Turn/reposition every 2 hours/use positioning/transfer devices   Protective dressings over bony prominences

## 2025-05-08 NOTE — PROGRESS NOTES
Communication Note    Patient Name: Senait Narvaez  MRN: [id number]  Today's Date: 5/8/2025   Room: 02/02-A    Discipline: Physical Therapy      PT Missed Visit: Yes  Missed Visit Reason:  (PT evaluation received and reviewed, patient pending transfusion RCE.  Will monitor and follow up as medically appropriate.)      05/08/25 at 1:42 PM   Mahendra Cook, PT   Rehab Office: [phone]

## 2025-05-08 NOTE — PROCEDURES
Central Line    Date/Time: 5/8/2025 3:24 PM    Performed by: Erika Martinez MD  Authorized by: Erika Martinez MD    Consent:     Consent obtained:  Written    Consent given by:  Patient    Risks, benefits, and alternatives were discussed: yes      Risks discussed:  Arterial puncture, incorrect placement, nerve damage, infection and bleeding    Alternatives discussed:  No treatment  Universal protocol:     Procedure explained and questions answered to patient or proxy's satisfaction: yes      Relevant documents present and verified: yes      Test results available: yes      Imaging studies available: yes      Required blood products, implants, devices, and special equipment available: yes      Site/side marked: yes      Immediately prior to procedure, a time out was called: yes      Patient identity confirmed:  Arm band and hospital-assigned identification number  Pre-procedure details:     Indication(s): central venous access      Hand hygiene: Hand hygiene performed prior to insertion      Sterile barrier technique: All elements of maximal sterile technique followed      Skin preparation:  Chlorhexidine  Sedation:     Sedation type:  None  Anesthesia:     Anesthesia method:  Local infiltration    Local anesthetic:  Lidocaine 1% w/o epi  Procedure details:     Location:  R femoral    Patient position:  Supine    Procedural supplies: Trialysis.    Ultrasound guidance: yes      Number of attempts:  2    Successful placement: yes    Post-procedure details:     Post-procedure:  Line sutured and dressing applied    Assessment:  Blood return through all ports    Procedure completion:  Tolerated

## 2025-05-08 NOTE — SIGNIFICANT EVENT
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 On presentation was found to be hypoxic to 82% on RA and was placed on 2L NC. CXR shows consolidation area in RLL. She was admitted and started on Abx for CAP. Labs also notable for CHARLES.     Rapid response overnight for hypotension and subsequent transfer to MICU. Labs notable for hypercapnia and worsening renal injury.     Patient meets criteria for ACS given hypoxia and RLL infiltrate. She also has developed significant CHARLES - although etiology is unclear this may be vaso-occlusion mediated.       -please consult transfusion medicine for red cell exchange  -obtain hemoglobin identification      Discussed with Dr. Petersen. Full consult note to follow later today.

## 2025-05-08 NOTE — SIGNIFICANT EVENT
Rapid Response 5/8/25 0115    Pt BP at 0015 79/51. MICHELE Mejia MD notified of pt BP. MD at bedside 0030. Pt had no c/o dizziness, lightheadedness. Pt drowsy, but arousable to voice. Pt had been unable to void since 5/6 (per pt on admission). LR bolus given at 2104 over 2 hours. Last pain med given at 2207.  Labs drawn and sent including VBG. Heparin gtt started at 0145.   0115 Rapid response called for low BP. RRT, charge RN, rapid rn, NACR, and MICU fellow at bedside.   0317 EKG complete and sent to MICHELE Mejia MD. Bedside echoe completed 0319.   0353 Pt a&ox4, VSS, and lokelma given for hyperkalemia.   0435 Pt transferred to MICU. Besdide report given to Babar Kay, RN

## 2025-05-08 NOTE — PROGRESS NOTES
SOCIAL WORK NOTE      05/08/25 1240   Discharge Planning   Living Arrangements Alone   Support Systems Children;Parent   Assistance Needed independent   Type of Residence Private residence   Number of Stairs to Enter Residence 0   Number of Stairs Within Residence 0   Do you have animals or pets at home? No   Who is requesting discharge planning? Patient   Home or Post Acute Services None   Expected Discharge Disposition Home   Financial Resource Strain   How hard is it for you to pay for the very basics like food, housing, medical care, and heating? Not hard   Housing Stability   In the last 12 months, was there a time when you were not able to pay the mortgage or rent on time? N   In the past 12 months, how many times have you moved where you were living? 0   At any time in the past 12 months, were you homeless or living in a shelter (including now)? N   Transportation Needs   In the past 12 months, has lack of transportation kept you from medical appointments or from getting medications? no   In the past 12 months, has lack of transportation kept you from meetings, work, or from getting things needed for daily living? No     NOK: Mother, POA papers scanned but not for healthcare   DME: rollator and CPAP +O2 (details unknown)  Home Care: denied  HD: denied   PCP: Michelle(sickle cell clinic)  Additional information: SW met with patient and mother at bedside for assessment. Patient normally lives at home alone, but daughter has been staying with her recently. She is typically independent at baseline. No reported issues with SDOH when prompted. Currently denied needs for social work. Social work to follow.   Brooklyn Unger, MSW, LISW-S ([id number])

## 2025-05-08 NOTE — SIGNIFICANT EVENT
Rapid Response Nurse Note: Rapid Response    Pager time: 115  Arrival time: 120  Event end time: 210  Location:   [] Triage by phone or secure messaging    Rapid response initiated by:  [] Rapid response RN [] Family [] Nursing Supervisor [] Physician   [] RADAR auto page [] Sepsis auto-page [x] RN [] RT   [] NP/PA [] Other:     Primary reason for call:   [] BAT [] New CPAP/BiPAP [] Bleeding [] Change in mental status   [] Chest pain [] Code blue [] FiO2 >/= 50% [] HR </= 40 bpm   [] HR >/= 130 bpm [] Hyperglycemia [] Hypoglycemia [] RADAR    [] RR </= 8 bpm [] RR >/= 30 bpm [] SBP </= 90 mmHg [] SpO2 < 90%   [] Seizure [] Sepsis [] Shortness of breath  [] Staff concern: see comments     Initial VS and/or RADAR VS: T 35.8 °C; HR 63; RR 16; BP 79/51; SPO2 98%.    Providers present at bedside (if applicable): MD Roberto PhD (Med Flex & NACR)    Name of ICU Provider contacted (if applicable): MD Robby (MICU Fellow)    Interventions:  [] None [] ABG/VBG [] Assist w/ICU transfer [] BAT paged    [] Bag mask [] Blood [] Cardioversion [] Code Blue   [] Code blue for intubation [] Code status changed [] Chest x-ray [] EKG   [] IV fluid/bolus [] KUB x-ray [] Labs/cultures [] Medication   [] Nebulizer treatment [] NIPPV (CPAP/BiPAP) [] Oxygen [] Oral airway   [x] Peripheral IV [] Palliative care consult [] CT/MRI [] Sepsis protocol    [] Suctioned [] Other:     Outcome:  [] Coded and  [] Code blue for intubation [] Coded and transferred to ICU []  on division   [x] Remained on division (no change) [] Remained on division + additional monitoring [] Remained in ED [] Transferred to ED   [] Transferred to ICU [] Transferred to inpatient status [] Transferred for interventions (procedure) [] Transferred to ICU stepdown    [] Transferred to surgery [] Transferred to telemetry [] Sepsis protocol [] STEMI protocol   [] Stroke protocol [x] Bedside nurse instructed to page rapid response for any concerns or  acute change in condition/VS     Additional Comments:   Rapid Response team at bedside for hypotension. Of note, pt has Hx of sickle cell disease with regular exchange transfusions and pulmonary HTN, currently admitted for PNA. Prior to arrival, labs drawn including VBG (See Media for photo; pH: 7.21 CO2: 69 Lactate: 1). Upon arrival, pt drowsy but arousable, having received dilaudid at 2207. Bedside RN, NACR(Primary) and MICU fellow at bedside. USGPIV placed. BP improved during event. Plan to monitor on division. Ending VS: HR: 66 R: 16 BP: 87/53 SpO2: 100% on 3L NC. No additional interventions by rapid response team indicated at this time.  Staff to page rapid response for any concerns or acute change in condition/VS.    0420 Pt accepted to MICU bed 2 by MICU fellow. Pt safely transported to MICU bed 2.

## 2025-05-08 NOTE — H&P
MEDICAL INTENSIVE CARE UNIT  ADMISSION H&P    Subjective   HPI:  Senait Narvaez is a 55 y.o. 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     She was admitted yesterday evening for dull neck pain which was progressive and became a generalized pain. She also endorsed left side facial swelling. She visited [facility] and was found to be satting 82% on RA. CXR showed new RLL infiltrate and leukocytosis of 11/7 and Cr of 2.75. INR 1.5 on admission. She was started on CTX/azithro for cf CAP. She was admitted for possible pain crisis, CHARLES and hypoxia.     Rapid called morning of 5/8 for hypotension to 76/52. VBG 7.21/69 lactate 1.0. BP improved slightly. Of note patient received metoprolol and dilaudid earlier in the day. Heparin gtt was started given hx of DVT/PE. DVT US ordered and stat TTE ordered.     Of note she has had many recent admissions for exchange transfusion. She was recently admitted for exchange transfusion 3/25 complicated by vitreous hemorrhage. She receives exchange transfusion (q6 weeks per hematologist) last 4/18 outpatient. She had long admission in 9-10/24 for exchange transfusion where she had concern for ACS and severe CHARLES. She was intubated at one point during this admission. It was also complicated by a drug rash.    On admission to CICU patient is Aox4 and HDS. She states she has been having full body pain for the past 2 days. She also endorses low urine output. She endorses chest pain in the center of her chest worse with a deep breath. Denies f/c. Denies cough. Denies any SOB. Says her breathing feels  "like it is at baseline. No other complaints.    Results from last 7 days   Lab Units 05/07/25  1059   WBC AUTO x10*3/uL 11.7*   HEMOGLOBIN g/dL 9.2*   HEMATOCRIT % 29.5*   PLATELETS AUTO x10*3/uL 336     Results from last 7 days   Lab Units 05/07/25 2057 05/07/25  1059   CO2 mmol/L 30 26   ANION GAP mmol/L 11 12   BUN mg/dL 25* 23   CREATININE mg/dL 3.49* 2.75*   GLUCOSE mg/dL 89 84   CALCIUM mg/dL 8.5* 8.8   PHOSPHORUS mg/dL 5.6*  --       Results from last 7 days   Lab Units 05/07/25  1059   ALT U/L 9   AST U/L 15   ALK PHOS U/L 127*      Results from last 7 days   Lab Units 05/07/25 2057 05/02/25  0000   INR EXT   --  2.20   INR  1.5*  --                    No lab exists for component: \"BNPRESU\", \"CPKT\"            Medical History:  PMH: Per HPI    Allergies: RX Allergies[1]     PSH: Per HPI  FamHx: Non contributory    SocHx:None contributory    ROS: 10-point ROS negative unless otherwise mentioned in HPI            Objective   Vitals:    05/08/25 0035   BP: 76/52   Pulse:    Resp:    Temp:    SpO2:         Physical Exam:  Physical Exam  Constitutional:       General: She is not in acute distress.     Appearance: Normal appearance.   HENT:      Head: Normocephalic and atraumatic.      Mouth/Throat:      Mouth: Mucous membranes are moist.      Pharynx: Oropharynx is clear.   Eyes:      General: No scleral icterus.     Extraocular Movements: Extraocular movements intact.      Conjunctiva/sclera: Conjunctivae normal.      Pupils: Pupils are equal, round, and reactive to light.   Cardiovascular:      Rate and Rhythm: Normal rate and regular rhythm.      Pulses: Normal pulses.      Heart sounds: Normal heart sounds. No murmur heard.     No friction rub. No gallop.   Pulmonary:      Effort: Pulmonary effort is normal.      Breath sounds: Wheezing present. No rhonchi or rales.   Abdominal:      General: Abdomen is flat. Bowel sounds are normal. There is no distension.      Palpations: Abdomen is soft.      Tenderness: " "There is no abdominal tenderness. There is no right CVA tenderness, left CVA tenderness, guarding or rebound.   Musculoskeletal:      Cervical back: Normal range of motion and neck supple.      Right lower leg: No edema.      Left lower leg: No edema.   Skin:     General: Skin is warm and dry.      Findings: No rash.   Neurological:      General: No focal deficit present.      Mental Status: She is alert and oriented to person, place, and time. Mental status is at baseline.   Psychiatric:         Mood and Affect: Mood normal.         Behavior: Behavior normal.         Thought Content: Thought content normal.         Judgment: Judgment normal.          Labs:    Lab Results   Component Value Date    WBC 11.7 (H) 05/07/2025    HGB 9.2 (L) 05/07/2025    HCT 29.5 (L) 05/07/2025    MCV 79 (L) 05/07/2025     05/07/2025      Hemoglobin   Date Value Ref Range Status   05/07/2025 9.2 (L) 12.0 - 16.0 g/dL Final   04/18/2025 9.7 (L) 12.0 - 16.0 g/dL Final   04/16/2025 10.4 (L) 12.0 - 16.0 g/dL Final   04/07/2025 10.3 (L) 12.0 - 16.0 g/dL Final   03/31/2025 10.9 (L) 12.0 - 16.0 g/dL Final        Lab Results   Component Value Date    CREATININE 3.49 (H) 05/07/2025    BUN 25 (H) 05/07/2025     05/07/2025    K 4.7 05/07/2025     05/07/2025    CO2 30 05/07/2025      Glucose   Date Value Ref Range Status   05/07/2025 89 74 - 99 mg/dL Final      Creatinine   Date Value Ref Range Status   05/07/2025 3.49 (H) 0.50 - 1.05 mg/dL Final   05/07/2025 2.75 (H) 0.50 - 1.05 mg/dL Final   04/16/2025 0.95 0.50 - 1.05 mg/dL Final   04/07/2025 0.95 0.50 - 1.05 mg/dL Final   03/31/2025 1.18 (H) 0.50 - 1.05 mg/dL Final      Lab Results   Component Value Date    CALCIUM 8.5 (L) 05/07/2025    PHOS 5.6 (H) 05/07/2025      No results found for: \"MG\"   Lab Results   Component Value Date    ALT 9 05/07/2025    AST 15 05/07/2025    ALKPHOS 127 (H) 05/07/2025    BILITOT 2.2 (H) 05/07/2025      Lab Results   Component Value Date    INR 1.5 " (H) 05/07/2025    INR 2.20 05/02/2025    INR 2.50 04/23/2025    PROTIME 16.8 (H) 05/07/2025    PROTIME 26.3 (H) 04/16/2025    PROTIME 14.6 (H) 04/07/2025        Imaging:  === Results for orders placed in visit on 05/07/25 ===    XR chest 1 view [[id number]] 05/07/2025    Status: Normal  1.  Bibasilar atelectasis with right basilar opacity may represent  early developing consolidation versus atelectasis.  2. Small left pleural effusion, similar to prior.    I personally reviewed the images/study and I agree with the findings  as stated by resident physician Main Piper MD. This study was  interpreted at [facility],  [place], [place].    MACRO:  None    Signed by: Momo Oconnell 5/7/2025 1:24 PM  Dictation workstation:   [id number]      === Results for orders placed during the hospital encounter of 04/18/25 ===    IR CVC nontunneled [[id number]] 04/18/2025    Status: Normal  1.  Uncomplicated and technically successful placement of an  indwelling  right   common femoral vein 30 cm cm 13 [place] Trialysis  catheter.    I was present for and/or performed the critical portions of the  procedure and immediately available throughout the entire procedure.    I personally reviewed the images/study and I agree with the findings  as stated. This study was interpreted at [place], [place].    MACRO:  None    Signed by: Gerry Recio 4/23/2025 12:06 PM  Dictation workstation:   [id number]      === Results for orders placed in visit on 03/19/25 ===    Panretinal Photocoagulation - OS - Left Eye [[id number]] 03/19/2025    Status: Normal    ___________________________________________________________________________    OCT, Retina - OU - Both Eyes [[id number]] 03/19/2025    Status: Normal      === Results for orders placed during the hospital encounter of 03/02/25 ===    Lower extremity venous duplex left [VAS12] 03/04/2025    Status:  Normal    ___________________________________________________________________________    ECG 12 lead [ECG1] 03/02/2025    Status: Normal    ___________________________________________________________________________    MR brain wo IV contrast [[id number]] 03/03/2025    Status: Normal  1. No evidence of acute infarct, intracranial mass effect or midline  shift.  2. Chronic lacunar infarcts within the right and possibly left  internal watershed territories, possibly secondary to previous  hypoperfusion. These findings are similar to previous MRI 09/06/2019.  Consider vascular imaging if indicated.    I personally reviewed the images/study and I agree with the findings  as stated by Dr. Darrel Judge M.D. This study was interpreted at  [facility], [place], [place].    MACRO:  None    Signed by: Marycarmen Finney 3/3/2025 12:35 PM  Dictation workstation:   [id number]    ___________________________________________________________________________    IR CVC nontunneled [[id number]] 03/03/2025    Status: Normal  1.  Technically uneventful placement of a   right  common femoral  vein temporary trialysis catheter under direct ultrasound and  fluoroscopic guidance - optimal catheter tip position in the inferior  vena cava and the catheter is ready for utilization.    I was present for and/or performed the critical portions of the  procedure and immediately available throughout the entire procedure.    I personally reviewed the images/study and I agree with the findings  as stated by Resident Dr. Jolie Phillips MD. This study was  interpreted at [facility],  [place], [place].    MACRO:  None    Signed by: Arnoldo Nonoan 3/3/2025 11:39 AM  Dictation workstation:   [id number]    ___________________________________________________________________________    XR chest 1 view [[id number]] 03/02/2025    Status: Normal  1.  Similar hazy opacity of the left lung base compared to  last chest  x-ray on 11/03/2024, again favored to represent overlying epicardial  fat pad, with a possible atelectatic component.  2. Otherwise no focal consolidation, pleural effusion, or  pneumothorax.    I personally reviewed the image(s)/study and resident interpretation.  I agree with the findings as stated by resident Diomedes Coleman.  Data analyzed and images interpreted at [facility], [place], [place].    MACRO:  None    Signed by: Sharda Kate 3/2/2025 6:15 PM  Dictation workstation:   [id number]    ___________________________________________________________________________    CT head wo IV contrast [[id number]] 03/02/2025    Status: Normal  No acute intracranial abnormality.    Signed by: Marycarmen Finney 3/2/2025 5:12 PM  Dictation workstation:   [id number]     Micro:  No results found for the last 90 days.            Assessment/Plan   Senait Narvaez is a 55 y.o. 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 Treating for CAP with CTX/azithro and pain control for pain crisis, undergoing CHARLES workup. Now transferred to MICU for hypotension with unclear etiology. Given pro thrombotic history will workup for thrombus with US, holding off on CT given severe CHARLES. Will also consult heme for possible exchange transfusion given sickle cell crisis with signs of end organ damage.    CNS  #somnolence - improved  ::Likely 2/2 hypercapnia vs hypotension  -treatment as below    PULM  #AHRF - improved  #acute hypercapnic respiratory failure -improved  #cf acute chest syndrome  #cf  CAP  #pulmonary HTN  ::possibly iso opioid use  -BiPAP if needed, will follow VBGs  -CAP treatment as below      CV  #hypotension -improved  ::sepsis vs obstructive vs cardiogenic iso R heart failure  ::Lactate normal during rapid 5/8  ::HDS on arrival to MICU  -Stat TTE  -Stat UE/LE DVT US    #HFpEF  #pHTN  #troponinemia   ::Last TTE 9/24 ED 60-65% reduced RV systolic function dilated RV possible McConels sign  ::troponin leak likely iso demand  -on lasix 20 every other day at home  -trend trop  -hold diuresis for hypotension  -repeat TTE    #hx of SVT  -hold metoprolol while hypotensive  -tele      GI  [facility]O    RENAL/  #CHARLES  #hyperkalemia  ::Baseline Cr ~1.0, 2.75>3.76 on admission  ::Some concern for kidney involvement of sickle cell crisis  -lokelma 10g q8  -stat RBUS with doppler  -urine electrolytes  -avoid nephrotoxins  -sickle cell crisis management as below  -nephrology consult in AM    ID  #cf CAP  -cw CTX/azithro  -fu blood cultures  -fu urine antigens      HEME/ONC  #sickle cell crisis  #HgbSC SCD  #R femoral head AVN  -holding off on fluids given pulmonary hypertension/right heart failure  -hemoglobin identification lab in process  -heme consult for necessity of exchange transfusion  -oxy 5 prn q 4h for moderate pain, 10 prn q4h for severe pain  -dilaudid 1 mg prn q3hr for breakthrough pain   -PO Benadryl 25 mg Q 6H PRN pruritus  -Continue home Zofran PRN  -Flexeril 10 mg every 8 hours PRN  -Continue home Folate and MVI on admit  -Nita-colace/miralax for constipation prevention    #hx of DVT/PE  #hx of SVC syndrome  ::On warfarin at home, INR 1.5 on admission  -heparin gtt  -UE/LE DVT US  -TTE as above    ENDO  CHOLO    MSK/Skin  CHOLO      F: avoiding iso R heart failure  E: K>4 Phos>3 Mag>2  N: NPO  A: PIV  O2: NC  Drips: heparin  Abx: CTX/azithro  DVT PPx: heparin gtt  GI PPx: none    CODE STATUS: FULL (confirmed with patient on admission)  SURROGATE DECISION MAKER: Mother Tati Narvaez [phone]      Aramis Renee MD  Resident, PGY-2           [1]   Allergies  Allergen Reactions    Vancomycin Rash    Oxycontin [Oxycodone] Drowsiness

## 2025-05-08 NOTE — POST-PROCEDURE NOTE
This is a 55 y.o. female with Hemoglobin SC Disease currently on the chronic exchange program who underwent automated red blood cell exchange (RCE) with 6 RBC units with target HgbS 19% and target HCT 28%.     I saw and evaluated the patient during the RCE.  The patient was resting in bed.  Vascular access functioned well.    WBC   Date/Time Value Ref Range Status   05/08/2025 05:07 AM 12.2 (H) 4.4 - 11.3 x10*3/uL Final     Hemoglobin   Date/Time Value Ref Range Status   05/08/2025 05:07 AM 8.4 (L) 12.0 - 16.0 g/dL Final     Hematocrit   Date/Time Value Ref Range Status   05/08/2025 05:07 AM 27.1 (L) 36.0 - 46.0 % Final     Platelets   Date/Time Value Ref Range Status   05/08/2025 05:07  150 - 450 x10*3/uL Final        POCT Calcium, Ionized   Date/Time Value Ref Range Status   04/18/2025 01:34 PM 0.99 (L) 1.1 - 1.33 mmol/L Final     Comment:     The performance characteristics of ionized calcium tested  in heparinized plasma or serum have been validated by the  individual  laboratory site where testing is performed.   Testing on heparinized plasma or serum is not approved by   the FDA; however, such approval is not necessary.        Hemoglobin S   Date/Time Value Ref Range Status   05/08/2025 05:07 AM 22.6 (H) <=0.0 % Preliminary        Ferritin   Date/Time Value Ref Range Status   03/31/2025 09:11 AM 17 8 - 150 ng/mL Final        Pre-procedure vital signs at 1537:  T: 36.4 C, HR: 70, RR: 16, BP: 98/59  Pulse oximetry: 94% on 4 L nasal cannula O2    Post-procedure vital signs at 1718:  T: 36.6 C, HR: 76, RR: 16, BP: 106/54  Pulse oximetry: 99% on 4 L nasal cannula O2    Outcome: RCE completed.  Adverse Reaction: No      Assessment and Plan:  55 y.o. female with Hemoglobin SC Disease who underwent RCE as part of the chronic exchange program.    Draw post-procedure labs  Vascular access to be managed by the clinical team.  No further RCE planned for this admission  Tentative next RCE in 4-6 weeks.

## 2025-05-08 NOTE — SIGNIFICANT EVENT
MICU Fellow Code Josephine Evaluation     Situation: Code josephine called for Senait Narvaez for hypotension    Background: Senait Narvaez is a 55 y.o. female admitted on 5/7/2025 for sickle cell pain, specifically in the back of her neck. 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 She reports coming in due to the pain, she was also being treated for PNA given RLL infiltrate.    Vital signs:   Vitals:    05/08/25 0310   BP: 92/58   Pulse: 74   Resp:    Temp:    SpO2:        Pertinent exam:   Alert intermittently oriented but sleepy  No increased work of breathing, hypoxia new at admission saturating well at 3L  Belly soft non tender  Extremities warm and well perfused    Pertinent labs:   CBC            9.1     13.8>---------<324              30.2     BMP  134     100    26                  ---------------------<88     5.6       28     3.76            Ca 8.3 Phos 6.3 Mg 2.56         ALT 11 AST 20 AlkPhos 131 Total bili 1.2         ABG/VBG   7.21 / 69 / 49 with lactate 1.0     Pertinent imaging:   RLL infiltrate on CXR    Assessment:  Senait Narvaez is a 55 y.o. female admitted on 5/7/2025 for sickle cell pain, specifically in the back of her neck. 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 She reports coming in due to the pain, she was also being treated for PNA given RLL infiltrate.    Currently, her hypotension is relatively stable and her mental status is slightly improved, and there is concern this is in the setting of recent pain medications. She does not have increased work of breathing. Acutely hemodynamics are stable.    However, her  clinical history and course are concerning given her sickle cell history with known multisystem organ dysfunction, and signs of end organ damage on lab studies. Her renal function is newly consistent with acute renal injury, and her CXR could be suggestive of acute chest   Initially, we deemed her hemodynamics do not warrant transfer to crash bed, but agree that close follow up and immediate escalation of care once needed are appropriate.  - Discussed with primary regarding STAT TTE for eval of both wall motion abnormality and RV dysfunction, and cardiac enzymes with trop BNP  - Start heparin gtt in the setting of subtherapeutic INR  - Holding on fluid given cardiopulm hx   - Consider reaching out to heme/transfusion medicine regarding cf need for exchange transfusion while we work on dispo      Disposition: Patient to stay on the floors pending further assessment  Will plan to bring to ICU if imminent change or once bed availability allows    Discussed with MICU attending Dr. Ayala    If patient does not improve after the above interventions or clinical status deteriorates, please recall code white/MICU fellow ([id number]).      05/08/25 at 3:35 AM - Sandrine Bustamante MD

## 2025-05-08 NOTE — PROGRESS NOTES
"Medical Intensive Care - Daily Progress Note   Subjective    Senait Narvaez is a 55 y.o. year old female patient admitted on 5/7/2025 with following ICU needs: acute hypoxic respiratory failure require BiPAP    Interval History:    Pt si drowsy and sleepy, can't keep the conversation with myself and feels sleep during the conversation. But she is alert and oriented x4. Endorses pain all over the body specifically on her neck and spine. No N/V. No chest pain.   Overnight placed on  BiPAP, now off BiPAP and 3 liter oxygen NC. Not feeling any SOB at all.    Meds    Scheduled medications  Scheduled Medications[1]  Continuous medications  Continuous Medications[2]  PRN medications  PRN Medications[3]     Objective    Blood pressure 120/52, pulse 72, temperature 36.5 °C (97.7 °F), resp. rate 14, height 1.651 m (5' 5\"), weight 99 kg (218 lb 4.1 oz), SpO2 94%.     Physical Exam  Constitutional:       Appearance: Normal appearance. She is obese.      Comments: Drowsy and sleepy   HENT:      Mouth/Throat:      Mouth: Mucous membranes are moist.      Pharynx: Oropharynx is clear.   Eyes:      General: Gaze aligned appropriately.   Neck:      Trachea: Trachea normal.   Cardiovascular:      Rate and Rhythm: Normal rate and regular rhythm.      Pulses:           Radial pulses are 2+ on the right side.        Dorsalis pedis pulses are 2+ on the right side.        Posterior tibial pulses are 2+ on the right side.      Heart sounds: Normal heart sounds, S1 normal and S2 normal.   Pulmonary:      Effort: Prolonged expiration present.      Breath sounds: Wheezing and rhonchi present.   Abdominal:      General: Bowel sounds are normal.      Palpations: Abdomen is soft.   Musculoskeletal:         General: Normal range of motion.      Cervical back: Neck supple. Muscular tenderness present.   Skin:     General: Skin is warm.   Neurological:      General: No focal deficit present.      Mental Status: She is alert and oriented to person, " place, and time.   Psychiatric:         Mood and Affect: Mood normal.         Behavior: Behavior normal.         Thought Content: Thought content normal.          Intake/Output Summary (Last 24 hours) at 5/8/2025 0949  Last data filed at 5/8/2025 0654  Gross per 24 hour   Intake 93 ml   Output 0 ml   Net 93 ml     Labs:   Results from last 72 hours   Lab Units 05/08/25  0507 05/08/25  0114 05/07/25  2057   SODIUM mmol/L 135* 134* 137   POTASSIUM mmol/L 5.1 5.6* 4.7   CHLORIDE mmol/L 100 100 101   CO2 mmol/L 28 28 30   BUN mg/dL 29* 26* 25*   CREATININE mg/dL 3.86* 3.76* 3.49*   GLUCOSE mg/dL 90 88 89   CALCIUM mg/dL 7.9* 8.3* 8.5*   ANION GAP mmol/L 12 12 11   EGFR mL/min/1.73m*2 13* 14* 15*   PHOSPHORUS mg/dL 5.6* 6.3* 5.6*      Results from last 72 hours   Lab Units 05/08/25  0507 05/08/25  0114 05/07/25  1059   WBC AUTO x10*3/uL 12.2* 13.8* 11.7*   HEMOGLOBIN g/dL 8.4* 9.1* 9.2*   HEMATOCRIT % 27.1* 30.2* 29.5*   PLATELETS AUTO x10*3/uL 329 324 336   NEUTROS PCT AUTO %  --   --  75.5   LYMPHO PCT MAN % 10.0  --   --    LYMPHS PCT AUTO %  --   --  17.0   MONO PCT MAN % 1.7  --   --    MONOS PCT AUTO %  --   --  6.7   EOSINO PCT MAN % 1.7  --   --    EOS PCT AUTO %  --   --  0.3          Results from last 72 hours   Lab Units 05/08/25  0806 05/08/25  0507 05/08/25  0407   POCT PH, VENOUS pH 7.20* 7.19* 7.15*   POCT PCO2, VENOUS mm Hg 75* 81* 86*   POCT PO2, VENOUS mm Hg 39 26* 25*        Micro/ID:     Lab Results   Component Value Date    URINECULTURE (A) 10/03/2024     Multiple organisms present, probable contamination. Repeat culture if clinically indicated.    BLOODCULT Loaded on Instrument - Culture in progress 05/07/2025    BLOODCULT Loaded on Instrument - Culture in progress 05/07/2025       Assessment and Plan     Assessment:Senait Narvaez is a 55 y.o. 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 Treating for CAP with CTX/azithro and pain control for pain crisis, undergoing CHARLES workup. Now transferred to MICU for hypotension with unclear etiology. Imaging and clinical state are consistent with acute chest syndrome, and the patient is experiencing additional acute kidney injury. Given pro thrombotic history will workup for thrombus with US, holding off on CT given severe CHARLES. Planned for blood exchange transfusion given sickle cell crisis with signs of end organ damage.        Updates 5/08/2025:  -  Imaging and clinical state are consistent with acute chest syndrome, and the patient is experiencing additional acute kidney injury.   - transfusion medicine consulted, planned for RCE procedure today   - Femoral catheter placed   - renal consulted and recs appreciated.   - VBG showed PH:7.15->7.20->7.23 , Co2 of 86->75->71 with bicarb of 28, pt hypercapnia improved on BiPAP, will continue with BiPAP prn for her respiratory status management        CNS  #somnolence - improved  ::Likely 2/2 hypercapnia vs hypotension  -treatment as below     PULM  #AHRF - improved  #acute hypercapnic respiratory failure -improved  #cf acute chest syndrome  #cf CAP  #pulmonary HTN  :: VBG showed PH:7.15->7.20->7.23 , Co2 of 86->75->71 with bicarb of 28, respiratory acidosis   ::possibly iso opioid use   -BiPAP if needed, will follow VBGs  -CAP treatment as below        CV  #hypotension -improved  ::sepsis vs obstructive vs cardiogenic iso R heart failure  ::Lactate normal during rapid 5/8  ::HDS on arrival to MICU  :: TTE: HFpEF with EF of 67% right ventricular overloaded , severely enlarged RV   -Stat UE/LE DVT US pending       #HFpEF  #pHTN  #troponinemia   ::Last TTE today: HFpEF with EF of 67% right ventricular overloaded , severely enlarged RV   ::troponin leak likely iso demand  -on lasix 20 every other day at home  - trop 320 ->310   -hold diuresis for hypotension       #hx of SVT  -hold metoprolol while hypotensive  -tele        GI  CHOLO     RENAL/  #CHARLES  #hyperkalemia  ::Baseline Cr ~1.0, 2.75>3.76->386 today   ::Some concern for kidney involvement of sickle cell crisis  -lokelma 10g q8  -stat RBUS with doppler  -urine electrolytes  -avoid nephrotoxins  -sickle cell crisis management as below  -nephrology consulted recs appreciated      ID  #cf CAP  -cw CTX/azithro  -fu blood cultures  -fu urine antigens        HEME/ONC  #sickle cell crisis  #HgbSC SCD  #R femoral head AVN  -holding off on fluids given pulmonary hypertension/right heart failure  -hemoglobin identification lab showed HbS 22% and HbC of 16%  -oxy 5 prn q 4h for moderate pain, 10 prn q4h for severe pain  -dilaudid 1 mg prn q3hr for breakthrough pain   -PO Benadryl 25 mg Q 6H PRN pruritus  -Continue home Zofran PRN  -Flexeril 10 mg every 8 hours PRN  -Continue home Folate and MVI on admit  -Nita-colace/miralax for constipation prevention  -planned for RCE based on worsening kidney function and acute chest syndrome        #hx of DVT/PE  #hx of SVC syndrome  ::On warfarin at home, INR 1.5 on admission  -heparin gtt  -UE/LE DVT US  -TTE as above     ENDO  CHOLO     MSK/Skin  CHOLO       ICU Check List       ICU Liberation: Intervention:   Assess, Prevent, Manage Pain 8 Oxycodone 5 mg for mild/ mod   Oxycodone 10 mg for severe pain   Both SAT and SBT [] SAT  [] SBT 30-60 min [] Extubate to NC  [] Extubate to NIV  [] Discuss Trach   Choice of analgesia and sedation Love Agitation Sedation Scale (RASS): Alert and calm none     Delirium: Assess, prevent and manage  CAM ICU Negative    Early Mobility and Exercise Proceed with mobilization - No exclusion criteria met [] PT  /OT consult   Family Engagement and Empowerment [x]Family updated []SW consult     FEN  F: avoiding iso R heart failure  E: K>4 Phos>3 Mag>2  N: NPO  A: PIV  O2: NC  Drips: heparin  Abx: CTX/azithro  DVT PPx: heparin gtt  GI PPx: none              Social:  Code: Full Code    HPOA: Mother Tati Narvaez [phone]   Disposition: ICU         Leonid Whiting MD   05/08/25 at 9:49 AM     Disclaimer: Documentation completed with the information available at the time of input. The times in the chart may not be reflective of actual patient care times, interventions, or procedures. Documentation occurs after the physical care of the patient.          [1] acetaminophen, 975 mg, oral, q8h  ascorbic acid, 500 mg, oral, Daily  atropine, 1 drop, Left Eye, Nightly  azithromycin, 500 mg, oral, q24h BEATA  cefTRIAXone, 2 g, intravenous, q24h  cetirizine, 10 mg, oral, Daily  folic acid, 1 mg, oral, Daily  [Held by provider] furosemide, 20 mg, oral, Every other day  [Held by provider] metoprolol tartrate, 25 mg, oral, BID  oxygen, , inhalation, Continuous - Inhalation  sennosides-docusate sodium, 2 tablet, oral, BID  sodium zirconium cyclosilicate, 10 g, oral, q8h  [Held by provider] warfarin, 5 mg, oral, Once per day on Sunday Monday Wednesday Thursday Friday Saturday  [Held by provider] warfarin, 7.5 mg, oral, Every Tuesday  [2] heparin, 0-4,500 Units/hr, Last Rate: Stopped (05/08/25 0654)  [3] PRN medications: albuterol, alteplase, cyclobenzaprine, fluticasone, heparin, naloxone, ondansetron, oxyCODONE, oxyCODONE, oxygen

## 2025-05-08 NOTE — PROGRESS NOTES
Pharmacy Medication History Review    Senait Narvaez is a 55 y.o. female admitted for Pneumonia due to infectious organism, unspecified laterality, unspecified part of lung. Pharmacy reviewed the patient's vorrf-db-bzhfdfzgh medications and allergies for accuracy.    Medications ADDED  N/A  Medications CHANGED  Warfarin 5 mg --> take 1 tablet once daily in the evening  Medications REMOVED/NOT TAKING   Lovenox 100 mg/mL syringe     The list below reflects the updated PTA list.   Prior to Admission Medications   Prescriptions Last Dose Informant   albuterol 90 mcg/actuation inhaler  Self   Sig: Inhale 2 puffs every 6 hours if needed for wheezing.   ascorbic acid (Vitamin C) 500 mg chewable tablet  Self   Sig: Chew 1 tablet (500 mg) once daily.   atropine 1 % ophthalmic solution  Self   Sig: Administer 1 drop into the left eye once daily at bedtime. Please continue to administer to Left eye until seen at outpt clinic with ophthalmology this upcoming week   cyclobenzaprine (Flexeril) 10 mg tablet  Self   Sig: Take 1 tablet (10 mg) by mouth 3 times a day as needed for muscle spasms.   enoxaparin (Lovenox) 100 mg/mL syringe Not Taking Self   Sig: Inject 1 mL (100 mg) under the skin 2 times a day. Continue to check INR daily at home. Once INR is 2-3 STOP lovenox and continue warfarin daily   Patient not taking: Reported on 5/8/2025   fluticasone (Flonase) 50 mcg/actuation nasal spray  Self   Sig: Administer 2 sprays into each nostril once daily as needed for rhinitis or allergies.   folic acid (Folvite) 1 mg tablet  Self   Sig: Take 1 tablet (1 mg) by mouth once daily.  Last viewable dispense history on 2/11/25 for 30 day supply     furosemide (Lasix) 20 mg tablet  Self   Sig: Take 1 tablet (20 mg) by mouth every other day.   loratadine (Claritin) 10 mg tablet  Self   Sig: Take 1 tablet (10 mg) by mouth once daily as needed for allergies.   metoprolol tartrate (Lopressor) 25 mg tablet  Self   Sig: Take 1 tablet (25 mg) by  mouth 2 times a day.   multivitamin tablet  Self   Sig: Take 1 tablet by mouth once daily.   naloxone (Narcan) 4 mg/0.1 mL nasal spray  Self   Sig: Administer 1 spray (4 mg) into affected nostril(s) if needed for opioid reversal. May repeat every 2-3 minutes if needed, alternating nostrils, until medical assistance becomes available.   ondansetron (Zofran) 4 mg tablet  Self   Sig: Take 1 tablet (4 mg) by mouth every 8 hours if needed for nausea or vomiting.   oxyCODONE (Roxicodone) 10 mg immediate release tablet  Self   Sig: Take 1 tablet (10 mg) by mouth every 4 hours if needed for severe pain (7 - 10) (pain).   oxygen (O2) gas therapy  Self   Sig: Inhale 1 each continuously.   traZODone (Desyrel) 50 mg tablet  Self   Sig: Take 1 tablet (50 mg) by mouth as needed at bedtime for sleep.  Last viewable dispense history on 11/3/24 for 30 day supply     triamcinolone (Kenalog) 0.1 % cream  Self   Sig: Apply topically 2 times a day. Apply triamcinolone 0.1% cream to both the black plaques as well as the red areas across the trunk and thighs   warfarin (Coumadin) 5 mg tablet  Self   Sig: Take 1 tablet (5 mg) by mouth once daily in the evening.   white petrolatum (Aquaphor) 41 % ointment ointment  Self   Sig: Apply 2 Applications topically 2 times a day. Apply vaseline to all eroded/denuded areas. Mepilex transfer sheets may be used for areas of friction      Facility-Administered Medications: None       The list below reflects the updated allergy list. Please review each documented allergy for additional clarification and justification.  Allergies  Reviewed by Zev Borges PharmD on 5/7/2025        Severity Reactions Comments    Vancomycin High Rash     Oxycontin [oxycodone] Not Specified Drowsiness             Patient accepts M2B at discharge. Pharmacy has been updated to [facility].    Sources  [facility]  Pharmacy dispense history  Patient Interview Good historian  Chart Review   Anticoagulation note 5/2/25    Additional  Comments  See highlighted sections in above table     Dre Barrera, PharmD  [facility]  [id number]  [facility], [address]  [place] [id number]  Phone: [phone]  Please reach out via Secure Chat for questions, or if no response call [facility] or [facility]

## 2025-05-08 NOTE — CONSULTS
"Inpatient consult to Nephrology  Consult performed by: Celso Rader DO  Consult ordered by: Kash Ayala MD          NEPHROLOGY NEW CONSULT NOTE   Senait Narvaez   55 y.o.    @WT@  MRN/Room: [id number]/-A    Reason for consult: CHARLES    HPI:  Senait Narvaez is a 55 y.o. 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 In MICU for AHRF reawuing BiPAP iso acute chest syndrome.,             In The ER: BP 96/65   Pulse 82   Temp 36.7 °C (98.1 °F)   Resp 16   Ht 1.651 m (5' 5\")   Wt 99 kg (218 lb 4.1 oz)   SpO2 97%   BMI 36.32 kg/m²      Medical History[1]   Surgical History[2]   Family History[3]  Social History     Socioeconomic History    Marital status: Single     Spouse name: Not on file    Number of children: Not on file    Years of education: Not on file    Highest education level: Not on file   Occupational History    Not on file   Tobacco Use    Smoking status: Former     Current packs/day: 0.00     Types: Cigarettes     Quit date:      Years since quittin.3     Passive exposure: Past    Smokeless tobacco: Never   Substance and Sexual Activity    Alcohol use: Not Currently    Drug use: Not Currently    Sexual activity: Not on file   Other Topics Concern    Not on file   Social History Narrative    Not on file     Social Drivers of Health     Financial Resource Strain: Low Risk  (2025)    Overall Financial Resource Strain (CARDIA)     Difficulty of Paying Living Expenses: Not hard at all   Food Insecurity: No Food Insecurity (2025)    Hunger Vital Sign     Worried About Running Out of Food in the Last Year: Never true     " Ran Out of Food in the Last Year: Never true   Transportation Needs: No Transportation Needs (5/8/2025)    PRAPARE - Transportation     Lack of Transportation (Medical): No     Lack of Transportation (Non-Medical): No   Physical Activity: Patient Declined (5/7/2025)    Exercise Vital Sign     Days of Exercise per Week: Patient declined     Minutes of Exercise per Session: Patient declined   Stress: Not on file   Social Connections: Feeling Socially Integrated (12/5/2024)    OASIS : Social Isolation     Frequency of experiencing loneliness or isolation: Never   Intimate Partner Violence: Not At Risk (5/7/2025)    Humiliation, Afraid, Rape, and Kick questionnaire     Fear of Current or Ex-Partner: No     Emotionally Abused: No     Physically Abused: No     Sexually Abused: No   Housing Stability: Low Risk  (5/8/2025)    Housing Stability Vital Sign     Unable to Pay for Housing in the Last Year: No     Number of Times Moved in the Last Year: 0     Homeless in the Last Year: No       Allergies[4]     Prescriptions Prior to Admission[5]     Meds:   acetaminophen, 650 mg, Once  acetaminophen, 975 mg, q8h  ascorbic acid, 500 mg, Daily  atropine, 1 drop, Nightly  azithromycin, 500 mg, q24h BEATA  cefTRIAXone, 2 g, q24h  cetirizine, 10 mg, Daily  diphenhydrAMINE, 25 mg, Once  folic acid, 1 mg, Daily  [Held by provider] furosemide, 20 mg, Every other day  heparin, 1,000 Units, Once  heparin, 1,000 Units, Once  [Held by provider] metoprolol tartrate, 25 mg, BID  oxygen, , Continuous - Inhalation  sennosides-docusate sodium, 2 tablet, BID  sodium zirconium cyclosilicate, 10 g, q8h  [Held by provider] warfarin, 5 mg, Once per day on Sunday Monday Wednesday Thursday Friday Saturday  [Held by provider] warfarin, 7.5 mg, Every Tuesday      calcium gluconate 2 g in 100 mL infusion  heparin, Last Rate: Stopped (05/08/25 1248)      albuterol, 2 puff, q6h PRN  alteplase, 2 mg, PRN  calcium carbonate, 1,500 mg, q5 min  PRN  cyclobenzaprine, 10 mg, TID PRN  diphenhydrAMINE, 25 mg, q5 min PRN  fluticasone, 2 spray, Daily PRN  heparin, 3,000-6,000 Units, q4h PRN  naloxone, 0.2 mg, q5 min PRN  ondansetron, 4 mg, q8h PRN  oxyCODONE, 10 mg, q4h PRN  oxyCODONE, 5 mg, q4h PRN  oxygen, , Continuous PRN - O2/gases  sodium chloride, 500 mL, Once PRN        Vitals:    05/08/25 1300   BP: 96/65   Pulse: 82   Resp: 16   Temp:    SpO2: 97%        05/06 1900 - 05/08 0659  In: 93 [I.V.:93]  Out: 0    Weight change:      General appearance: AAOx3. No distress  Eyes: non-icteric  Skin: no apparent rash  Heart: regular. no rub  Lungs: CTA bilat.  no wheezing/crackles  Abdomen: soft, nt/nd  Extremities: no edema bilat  : no Slater  Neuro: No FND, no asterixis   ACCESS: PIV, Trialysis line       ASSESSMENT:  Senait Narvaez is a  55 y.o.  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    Patient does not have underlying CKD, but has had multiple significant CHARLES during prior hospitalization (Cr peak 5.4 iso septic shock in 9/2024).  Nephrology engaged during that time, patient did not require dialysis and was supported with exchange transfusions for underlying sickle cell.  Patient had repeat CHARLES after hospitalization in December, but had returned to baseline afterwards.  Baseline creatinine at 1, EGFR greater than 60.  When admitted on 5/7 creatinine was 2.7 with uptrend to 3.9.  Given patient's history of good renal recovery, hopeful that she will recover from this CHARLES as she is being treated for acute chest.        #CHARLES on no CKD, oliguric, Stage III  - Baseline creatinine: 1.0   - Etiology: Sepsis,  hypovolemia  - UA showed:  none yet   - Clinical volume status: Euvolemic  - Electrolytes (Na, K, Ca, Phos) now stable, prior hyper K requiring    - Acid base status: respiratory acidosis        Recommendations:  - Indication for dialysis:  No   - Agree with Renal US  - Ensure UA, urine electrolytes, albumin creatinine ratio, urine protein, spot are collected.  - Avoid nephrotoxins, contrast if possible  - strict Is/Os  - Renal dosing for medications for latest eGFR, follow medication trough as appropriate  - Avoid hypotension/rapid fluctuations in BPs    Celso Rader DO  Nephrology Fellow  24 hour Renal Pager - [phone]    Discussed with attending nephrologist           [1]   Past Medical History:  Diagnosis Date    Asthma     CHF (congestive heart failure)     Chronic pain disorder     Compression of vein 02/19/2020    Superior vena cava syndrome    Hypotension, unspecified 12/10/2020   [2]   Past Surgical History:  Procedure Laterality Date    APPENDECTOMY  08/05/2013    Appendectomy    BIOPSY  9/15/2024    CHOLECYSTECTOMY  08/05/2013    Cholecystectomy    CT ANGIO NECK  10/19/2022    CT NECK ANGIO W AND WO IV CONTRAST 10/19/2022 [facility] CLINICAL LEGACY    CT GUIDED PERCUTANEOUS BIOPSY LYMPH NODE SUPERFICIAL  9/19/2019    CT GUIDED PERCUTANEOUS BIOPSY LYMPH NODE SUPERFICIAL 9/19/2019 [facility] INPATIENT LEGACY    IR CVC NONTUNNELED  10/26/2023    IR CVC NONTUNNELED 10/26/2023 [facility] ANGIO    IR CVC NONTUNNELED  12/7/2023    IR CVC NONTUNNELED 12/7/2023 Marcos Moser MD [facility] ANGIO    IR CVC TUNNELED  3/13/2015    IR CVC TUNNELED 3/13/2015 [facility] CLINICAL LEGACY    IR CVC TUNNELED  1/29/2016    IR CVC TUNNELED 1/29/2016 [facility] AIB LEGACY    IR CVC TUNNELED  1/17/2018    IR CVC TUNNELED 1/17/2018 [facility] AIB LEGACY    IR CVC TUNNELED  2/22/2018    IR CVC TUNNELED 2/22/2018 [facility] AIB LEGACY    IR CVC TUNNELED  3/22/2018    IR CVC TUNNELED 3/22/2018 [facility] CLINICAL LEGACY    IR CVC TUNNELED  4/18/2018    IR CVC TUNNELED 4/18/2018  [facility] AIB LEGACY    IR CVC TUNNELED  5/16/2018    IR CVC TUNNELED 5/16/2018 [facility] AIB LEGACY    IR CVC TUNNELED  6/12/2018    IR CVC TUNNELED 6/12/2018 [facility] AIB LEGACY    IR CVC TUNNELED  1/21/2020    IR CVC TUNNELED 1/21/2020 [facility] INPATIENT LEGACY    IR CVC TUNNELED  2/28/2020    IR CVC TUNNELED 2/28/2020 [facility] ANCILLARY LEGACY    IR CVC TUNNELED  4/10/2020    IR CVC TUNNELED 4/10/2020 [facility] AIB LEGACY    IR CVC TUNNELED  5/22/2020    IR CVC TUNNELED 5/22/2020 [facility] ANCILLARY LEGACY    IR CVC TUNNELED  6/19/2020    IR CVC TUNNELED 6/19/2020 [facility] AIB LEGACY    IR CVC TUNNELED  7/23/2020    IR CVC TUNNELED 7/23/2020 [facility] AIB LEGACY    IR CVC TUNNELED  9/4/2020    IR CVC TUNNELED 9/4/2020 [facility] AIB LEGACY    IR CVC TUNNELED  10/9/2020    IR CVC TUNNELED 10/9/2020 [facility] ANCILLARY LEGACY    IR CVC TUNNELED  11/13/2020    IR CVC TUNNELED 11/13/2020 [facility] AIB LEGACY    IR CVC TUNNELED  12/18/2020    IR CVC TUNNELED 12/18/2020 [facility] AIB LEGACY    IR CVC TUNNELED  1/22/2021    IR CVC TUNNELED 1/22/2021 [facility] AIB LEGACY    IR CVC TUNNELED  2/26/2021    IR CVC TUNNELED 2/26/2021 [facility] CLINICAL LEGACY    IR CVC TUNNELED  4/2/2021    IR CVC TUNNELED 4/2/2021 [facility] AIB LEGACY    IR CVC TUNNELED  5/7/2021    IR CVC TUNNELED 5/7/2021 [facility] ANCILLARY LEGACY    IR CVC TUNNELED  6/11/2021    IR CVC TUNNELED 6/11/2021 [facility] AIB LEGACY    IR CVC TUNNELED  7/16/2021    IR CVC TUNNELED 7/16/2021 [facility] ANCILLARY LEGACY    IR CVC TUNNELED  8/20/2021    IR CVC TUNNELED 8/20/2021 [facility] AIB LEGACY    IR CVC TUNNELED  9/24/2021    IR CVC TUNNELED 9/24/2021 [facility] AIB LEGACY    IR CVC TUNNELED  10/29/2021    IR CVC TUNNELED 10/29/2021 [facility] AIB LEGACY    IR CVC TUNNELED  12/3/2021    IR CVC TUNNELED 12/3/2021 [facility] AIB LEGACY    IR CVC TUNNELED  1/7/2022    IR CVC TUNNELED 1/7/2022 [facility] ANCILLARY LEGACY    IR CVC TUNNELED  2/23/2022    IR CVC TUNNELED 2/23/2022 [facility] CLINICAL LEGACY    IR CVC TUNNELED  3/25/2022    IR CVC TUNNELED 3/25/2022 [facility] ANCILLARY LEGACY    IR CVC TUNNELED   4/22/2022    IR CVC TUNNELED 4/22/2022 [facility] ANCILLARY LEGACY    IR CVC TUNNELED  6/3/2022    IR CVC TUNNELED 6/3/2022 [facility] ANCILLARY LEGACY    IR CVC TUNNELED  9/18/2017    IR CVC TUNNELED 9/18/2017 [facility] AIB LEGACY    IR CVC TUNNELED  10/30/2017    IR CVC TUNNELED 10/30/2017 [facility] AIB LEGACY    IR CVC TUNNELED  12/19/2017    IR CVC TUNNELED 12/19/2017 [facility] AIB LEGACY    IR CVC TUNNELED  1/6/2023    IR CVC TUNNELED 1/6/2023 [facility] OFFICE LEGACY    IR CVC TUNNELED  2/24/2023    IR CVC TUNNELED [facility] ANGIO    IR CVC TUNNELED  7/8/2022    IR CVC TUNNELED 7/8/2022 [facility] ANCILLARY LEGACY    IR CVC TUNNELED  8/12/2022    IR CVC TUNNELED 8/12/2022 [facility] CLINICAL LEGACY    IR CVC TUNNELED  9/16/2022    IR CVC TUNNELED 9/16/2022 [facility] ANCILLARY LEGACY    IR CVC TUNNELED  10/20/2022    IR CVC TUNNELED 10/20/2022 [facility] CLINICAL LEGACY    IR CVC TUNNELED  11/29/2022    IR CVC TUNNELED 11/29/2022 [facility] ANCILLARY LEGACY    IR CVC TUNNELED  3/31/2023    IR CVC TUNNELED [facility] ANGIO    IR CVC TUNNELED  6/9/2023    IR CVC TUNNELED 6/9/2023 [facility] ANGIO    IR CVC TUNNELED  7/20/2023    IR CVC TUNNELED 7/20/2023 [facility] ANGIO    IR CVC TUNNELED  8/16/2023    IR CVC TUNNELED 8/16/2023 [facility] ANGIO    IR CVC TUNNELED  9/21/2023    IR CVC TUNNELED 9/21/2023 [facility] ANGIO    MR HEAD ANGIO WO IV CONTRAST  8/16/2014    MR HEAD ANGIO WO IV CONTRAST 8/16/2014 [facility] ANCILLARY LEGACY    MR NECK ANGIO WO IV CONTRAST  8/16/2014    MR NECK ANGIO WO IV CONTRAST 8/16/2014 [facility] ANCILLARY LEGACY    OTHER SURGICAL HISTORY  05/13/2014    Fluid Drained, Retina Inspected: Breaks Supported By Buckle    OTHER SURGICAL HISTORY  10/09/2014    Closed Treatment Of Fracture Of The Lateral Malleolus    OTHER SURGICAL HISTORY  06/09/2014    Salpingo-oophorectomy Right Side    US GUIDED BIOPSY LYMPH NODE SUPERFICIAL  9/17/2019    US GUIDED BIOPSY LYMPH NODE SUPERFICIAL 9/17/2019 [facility] INPATIENT LEGACY   [3]   Family History  Problem Relation Name Age of Onset    Diabetes Father      Gout Father       Sickle cell trait Father      Seizures Sister      Diabetes Other      Uterine cancer Other      Other (congenital blindness) Cousin     [4]   Allergies  Allergen Reactions    Vancomycin Rash    Oxycontin [Oxycodone] Drowsiness   [5]   Medications Prior to Admission   Medication Sig Dispense Refill Last Dose/Taking    albuterol 90 mcg/actuation inhaler Inhale 2 puffs every 6 hours if needed for wheezing. 18 g 3     ascorbic acid (Vitamin C) 500 mg chewable tablet Chew 1 tablet (500 mg) once daily.       atropine 1 % ophthalmic solution Administer 1 drop into the left eye once daily at bedtime. Please continue to administer to Left eye until seen at outpt clinic with ophthalmology this upcoming week 15 mL 0     cyclobenzaprine (Flexeril) 10 mg tablet Take 1 tablet (10 mg) by mouth 3 times a day as needed for muscle spasms. 30 tablet 3     enoxaparin (Lovenox) 100 mg/mL syringe Inject 1 mL (100 mg) under the skin 2 times a day. Continue to check INR daily at home. Once INR is 2-3 STOP lovenox and continue warfarin daily (Patient not taking: Reported on 5/8/2025) 8 each 0 Not Taking    fluticasone (Flonase) 50 mcg/actuation nasal spray Administer 2 sprays into each nostril once daily as needed for rhinitis or allergies.       folic acid (Folvite) 1 mg tablet Take 1 tablet (1 mg) by mouth once daily.       furosemide (Lasix) 20 mg tablet Take 1 tablet (20 mg) by mouth every other day. 45 tablet 3     loratadine (Claritin) 10 mg tablet Take 1 tablet (10 mg) by mouth once daily as needed for allergies.       metoprolol tartrate (Lopressor) 25 mg tablet Take 1 tablet (25 mg) by mouth 2 times a day. 180 tablet 3     multivitamin tablet Take 1 tablet by mouth once daily.       naloxone (Narcan) 4 mg/0.1 mL nasal spray Administer 1 spray (4 mg) into affected nostril(s) if needed for opioid reversal. May repeat every 2-3 minutes if needed, alternating nostrils, until medical assistance becomes available. 2 each 0      ondansetron (Zofran) 4 mg tablet Take 1 tablet (4 mg) by mouth every 8 hours if needed for nausea or vomiting. 20 tablet 1     oxyCODONE (Roxicodone) 10 mg immediate release tablet Take 1 tablet (10 mg) by mouth every 4 hours if needed for severe pain (7 - 10) (pain). 75 tablet 0     oxygen (O2) gas therapy Inhale 1 each continuously. 1 each 0     traZODone (Desyrel) 50 mg tablet Take 1 tablet (50 mg) by mouth as needed at bedtime for sleep. 30 tablet 0     triamcinolone (Kenalog) 0.1 % cream Apply topically 2 times a day. Apply triamcinolone 0.1% cream to both the black plaques as well as the red areas across the trunk and thighs       warfarin (Coumadin) 5 mg tablet Take 1 tablet (5 mg) by mouth once daily in the evening. 90 tablet 3     white petrolatum (Aquaphor) 41 % ointment ointment Apply 2 Applications topically 2 times a day. Apply vaseline to all eroded/denuded areas. Mepilex transfer sheets may be used for areas of friction

## 2025-05-08 NOTE — CONSULTS
"Reason for consult:   Red Cell Exchange (RCE) for Sickle cell disease, acute: Acute Chest Syndrome, Severe (ASFA 2023: Category II, Grade 1C).    HPI:   Senait Narvaez is a 55 y.o. female with hemoglobin S-C disease, admitted with pain crisis. Imaging and clinical state are consistent with acute chest syndrome, and the patient is experiencing additional acute kidney injury.     Transfusion Medicine was consulted for a RCE procedure to help with further management.    Past medical history:   Medical History[1]     Past surgical history:  Surgical History[2]     Allergies:   RX Allergies[3]    ROS (limited):  ROS per chart.    Medications:  Current Medications[4]     Vitals:  Visit Vitals  BP 89/63   Pulse 75   Temp 36.7 °C (98.1 °F)   Resp 16   Ht 1.651 m (5' 5\")   Wt 99 kg (218 lb 4.1 oz)   SpO2 100%   BMI 36.32 kg/m²   OB Status Postmenopausal   Smoking Status Former   BSA 2.13 m²        Labs:  WBC   Date/Time Value Ref Range Status   05/08/2025 05:07 AM 12.2 (H) 4.4 - 11.3 x10*3/uL Final     Hemoglobin   Date/Time Value Ref Range Status   05/08/2025 05:07 AM 8.4 (L) 12.0 - 16.0 g/dL Final     Hematocrit   Date/Time Value Ref Range Status   05/08/2025 05:07 AM 27.1 (L) 36.0 - 46.0 % Final     Platelets   Date/Time Value Ref Range Status   05/08/2025 05:07  150 - 450 x10*3/uL Final        POCT Calcium, Ionized   Date/Time Value Ref Range Status   04/18/2025 01:34 PM 0.99 (L) 1.1 - 1.33 mmol/L Final     Comment:     The performance characteristics of ionized calcium tested  in heparinized plasma or serum have been validated by the  individual  laboratory site where testing is performed.   Testing on heparinized plasma or serum is not approved by   the FDA; however, such approval is not necessary.        Hemoglobin S   Date/Time Value Ref Range Status   05/08/2025 05:07 AM 22.6 (H) <=0.0 % Preliminary        Ferritin   Date/Time Value Ref Range Status   03/31/2025 09:11 AM 17 8 - 150 ng/mL Final    "     Assessment/Plan:  55 y.o. female with Sickle cell disease, acute: Acute Chest Syndrome, Severe (ASFA 2023: Category II, Grade 1C)  1. Explained the procedure and obtained consent from the patient.  2. Vascular access to be maintained by clinical team.  3. RCE scheduled tentatively for 5/8/2025, possibly 5/9/2025.          [1]   Past Medical History:  Diagnosis Date    Asthma     CHF (congestive heart failure)     Chronic pain disorder     Compression of vein 02/19/2020    Superior vena cava syndrome    Hypotension, unspecified 12/10/2020   [2]   Past Surgical History:  Procedure Laterality Date    APPENDECTOMY  08/05/2013    Appendectomy    BIOPSY  9/15/2024    CHOLECYSTECTOMY  08/05/2013    Cholecystectomy    CT ANGIO NECK  10/19/2022    CT NECK ANGIO W AND WO IV CONTRAST 10/19/2022 [facility] CLINICAL LEGACY    CT GUIDED PERCUTANEOUS BIOPSY LYMPH NODE SUPERFICIAL  9/19/2019    CT GUIDED PERCUTANEOUS BIOPSY LYMPH NODE SUPERFICIAL 9/19/2019 [facility] [place]    IR CVC NONTUNNELED  10/26/2023    IR CVC NONTUNNELED 10/26/2023 [facility] [place]    IR CVC NONTUNNELED  12/7/2023    IR CVC NONTUNNELED 12/7/2023 Marcos Moser MD [facility] [place]    IR CVC TUNNELED  3/13/2015    IR CVC TUNNELED 3/13/2015 [facility] CLINICAL LEGACY    IR CVC TUNNELED  1/29/2016    IR CVC TUNNELED 1/29/2016 [facility] [place] [place]    IR CVC TUNNELED  1/17/2018    IR CVC TUNNELED 1/17/2018 [facility] [place] [facility]    IR CVC TUNNELED  2/22/2018    IR CVC TUNNELED 2/22/2018 [facility] [place] [facility]    IR CVC TUNNELED  3/22/2018    IR CVC TUNNELED 3/22/2018 [facility] CLINICAL LEGACY    IR CVC TUNNELED  4/18/2018    IR CVC TUNNELED 4/18/2018 [facility] [place] [facility]    IR CVC TUNNELED  5/16/2018    IR CVC TUNNELED 5/16/2018 [facility] [place] [facility]    IR CVC TUNNELED  6/12/2018    IR CVC TUNNELED 6/12/2018 [facility] [place] [facility]    IR CVC TUNNELED  1/21/2020    IR CVC TUNNELED 1/21/2020 [facility] INPATIENT LEGACY    IR CVC TUNNELED  2/28/2020    IR CVC TUNNELED 2/28/2020 [facility] ANCILLARY LEGACY    IR CVC TUNNELED  4/10/2020     IR CVC TUNNELED 4/10/2020 [facility] [place]    IR CVC TUNNELED  5/22/2020    IR CVC TUNNELED 5/22/2020 [facility] [place] [facility] [place]    IR CVC TUNNELED  6/19/2020    IR CVC TUNNELED 6/19/2020 [facility] [place] [facility]    IR CVC TUNNELED  7/23/2020    IR CVC TUNNELED 7/23/2020 [facility] [place] [facility]    IR CVC TUNNELED  9/4/2020    IR CVC TUNNELED 9/4/2020 [facility] [place] [facility]    IR CVC TUNNELED  10/9/2020    IR CVC TUNNELED 10/9/2020 [facility] [place] [facility]    IR CVC TUNNELED  11/13/2020    IR CVC TUNNELED 11/13/2020 [facility] [place] [facility]    IR CVC TUNNELED  12/18/2020    IR CVC TUNNELED 12/18/2020 [facility] [place] [facility]    IR CVC TUNNELED  1/22/2021    IR CVC TUNNELED 1/22/2021 [facility] [place] [facility]    IR CVC TUNNELED  2/26/2021    IR CVC TUNNELED 2/26/2021 [facility] CLINICAL LEGACY    IR CVC TUNNELED  4/2/2021    IR CVC TUNNELED 4/2/2021 [facility] [place] [facility]    IR CVC TUNNELED  5/7/2021    IR CVC TUNNELED 5/7/2021 [facility] [place] [facility]    IR CVC TUNNELED  6/11/2021    IR CVC TUNNELED 6/11/2021 [facility] [place] [facility]    IR CVC TUNNELED  7/16/2021    IR CVC TUNNELED 7/16/2021 [facility] [place] [facility]    IR CVC TUNNELED  8/20/2021    IR CVC TUNNELED 8/20/2021 [facility] [place] [facility]    IR CVC TUNNELED  9/24/2021    IR CVC TUNNELED 9/24/2021 [facility] [place] [facility]    IR CVC TUNNELED  10/29/2021    IR CVC TUNNELED 10/29/2021 [facility] [place] [facility]    IR CVC TUNNELED  12/3/2021    IR CVC TUNNELED 12/3/2021 [facility] [place] [facility]    IR CVC TUNNELED  1/7/2022    IR CVC TUNNELED 1/7/2022 [facility] [place] [facility]GAC[facility]    IR CVC TUNNELED  2/23/2022    IR CVC TUNNELED 2/23/2022 [facility] CLINICAL LEGACY    IR CVC TUNNELED  3/25/2022    IR CVC TUNNELED 3/25/2022 [facility] ANCILLARY LEGACY    IR CVC TUNNELED  4/22/2022    IR CVC TUNNELED 4/22/2022 [facility] ANCILLARY LEGACY    IR CVC TUNNELED  6/3/2022    IR CVC TUNNELED 6/3/2022 [facility] [place] [facility]    IR CVC TUNNELED  9/18/2017    IR CVC TUNNELED 9/18/2017 [facility] AIB LEGACY    IR CVC TUNNELED  10/30/2017    IR CVC TUNNELED 10/30/2017 [facility] AIB LEGACY    IR CVC TUNNELED  12/19/2017    IR CVC TUNNELED 12/19/2017 [facility] AIB  LEGACY    IR CVC TUNNELED  1/6/2023    IR CVC TUNNELED 1/6/2023 [facility] LEGAC[facility]    IR CVC TUNNELED  2/24/2023    IR CVC TUNNELED [facility] ANGIO    IR CVC TUNNELED  7/8/2022    IR CVC TUNNELED 7/8/2022 [facility] ANCILLARY LEGACY    IR CVC TUNNELED  8/12/2022    IR CVC TUNNELED 8/12/2022 [facility] CLINICAL LEGACY    IR CVC TUNNELED  9/16/2022    IR CVC TUNNELED 9/16/2022 [facility] ANCILLARY LEGACY    IR CVC TUNNELED  10/20/2022    IR CVC TUNNELED 10/20/2022 [facility] CLINICAL LEGACY    IR CVC TUNNELED  11/29/2022    IR CVC TUNNELED 11/29/2022 [facility] ANCILLARY LEGACY    IR CVC TUNNELED  3/31/2023    IR CVC TUNNELED [facility] ANGIO    IR CVC TUNNELED  6/9/2023    IR CVC TUNNELED 6/9/2023 [facility] ANGIO    IR CVC TUNNELED  7/20/2023    IR CVC TUNNELED 7/20/2023 [facility] ANGIO    IR CVC TUNNELED  8/16/2023    IR CVC TUNNELED 8/16/2023 [facility] ANGIO    IR CVC TUNNELED  9/21/2023    IR CVC TUNNELED 9/21/2023 [facility]GIO    MR HEAD ANGIO WO IV CONTRAST  8/16/2014    MR HEAD ANGIO WO IV CONTRAST 8/16/2014 [facility] [place] LEGAC[facility]    MR NECK ANGIO WO IV CONTRAST  8/16/2014    MR NECK ANGIO WO IV CONTRAST 8/16/2014 [facility] [place] [facility]CILL[facility] LEGAC[facility]    OTHER SURGICAL HISTORY  05/13/2014    Fluid Drained, Retina Inspected: Breaks Supported By Buckle    OTHER SURGICAL HISTORY  10/09/2014    Closed Treatment Of Fracture Of The Lateral Malleolus    OTHER SURGICAL HISTORY  06/09/2014    Salpingo-oophorectomy Right Side    US GUIDED BIOPSY LYMPH NODE SUPERFICIAL  9/17/2019    US GUIDED BIOPSY LYMPH NODE SUPERFICIAL 9/17/2019 [facility] [place] [place]   [3]   Allergies  Allergen Reactions    Vancomycin Rash    Oxycontin [Oxycodone] Drowsiness   [4]   Current Facility-Administered Medications:     acetaminophen (Tylenol) tablet 975 mg, 975 mg, oral, q8h, Aramis Renee MD    albuterol 90 mcg/actuation inhaler 2 puff, 2 puff, inhalation, q6h PRN, Aramis Renee MD    alteplase (Cathflo Activase) injection 2 mg, 2 mg, intra-catheter, PRN, Aramis Renee MD    ascorbic acid (Vitamin C) tablet 500 mg, 500  mg, oral, Daily, Aramis Renee MD, 500 mg at 05/08/25 0843    atropine 1 % ophthalmic solution 1 drop, 1 drop, Left Eye, Nightly, Aramis Renee MD, 1 drop at 05/07/25 2207    azithromycin (Zithromax) tablet 500 mg, 500 mg, oral, q24h BEATA, Leonid Whiting MD, 500 mg at 05/08/25 1043    cefTRIAXone (Rocephin) 2 g in dextrose (iso) IV 50 mL, 2 g, intravenous, q24h, Leonid Whiting MD    cetirizine (ZyrTEC) tablet 10 mg, 10 mg, oral, Daily, Aramis Renee MD, 10 mg at 05/08/25 0843    cyclobenzaprine (Flexeril) tablet 10 mg, 10 mg, oral, TID PRN, Aramis Renee MD    fluticasone (Flonase) nasal spray 2 spray, 2 spray, Each Nostril, Daily PRN, Aramis Renee MD    folic acid (Folvite) tablet 1 mg, 1 mg, oral, Daily, Aramis Renee MD, 1 mg at 05/08/25 0842    [Held by provider] furosemide (Lasix) tablet 20 mg, 20 mg, oral, Every other day, Aramis Renee MD    heparin 25,000 Units in dextrose 5% 250 mL (100 Units/mL) infusion (premix), 0-4,500 Units/hr, intravenous, Continuous, Aramis Renee MD, Last Rate: 15 mL/hr at 05/08/25 1100, 1,500 Units/hr at 05/08/25 1100    heparin bolus from bag 3,000-6,000 Units, 3,000-6,000 Units, intravenous, q4h PRN, Aramis Renee MD    [Held by provider] metoprolol tartrate (Lopressor) tablet 25 mg, 25 mg, oral, BID, Aramis Renee MD, 25 mg at 05/07/25 2103    naloxone (Narcan) injection 0.2 mg, 0.2 mg, intravenous, q5 min PRN, Aramis Renee MD    ondansetron (Zofran) tablet 4 mg, 4 mg, oral, q8h PRN, Aramis Renee MD    oxyCODONE (Roxicodone) immediate release tablet 10 mg, 10 mg, oral, q4h PRN, Aramis Renee MD, 10 mg at 05/08/25 0843    oxyCODONE (Roxicodone) immediate release tablet 5 mg, 5 mg, oral, q4h PRN, Aramis Renee MD    oxygen (O2) therapy, , inhalation, Continuous PRN - O2/gases, Aramis Renee MD    oxygen (O2) therapy, , inhalation, Continuous - Inhalation, Sandrine Bustamante MD    sennosides-docusate sodium (Nita-Colace) 8.6-50 mg per tablet 2  tablet, 2 tablet, oral, BID, Aramis Renee MD, 2 tablet at 05/08/25 0843    sodium zirconium cyclosilicate (Lokelma) packet 10 g, 10 g, oral, q8h, Aramis Renee MD, 10 g at 05/08/25 1043    [Held by provider] warfarin (Coumadin) tablet 5 mg, 5 mg, oral, Once per day on Sunday Monday Wednesday Thursday Friday Saturday, Aramis Renee MD    [Held by provider] warfarin (Coumadin) tablet 7.5 mg, 7.5 mg, oral, Every Tuesday, Aramis Renee MD

## 2025-05-09 ENCOUNTER — APPOINTMENT (OUTPATIENT)
Dept: RADIOLOGY | Facility: HOSPITAL | Age: 55
DRG: 871 | End: 2025-05-09
Payer: COMMERCIAL

## 2025-05-09 LAB
ALBUMIN SERPL BCP-MCNC: 2.8 G/DL (ref 3.4–5)
ALP SERPL-CCNC: 94 U/L (ref 33–110)
ALT SERPL W P-5'-P-CCNC: 9 U/L (ref 7–45)
ANION GAP BLDV CALCULATED.4IONS-SCNC: 6 MMOL/L (ref 10–25)
ANION GAP SERPL CALC-SCNC: 12 MMOL/L (ref 10–20)
APPEARANCE UR: CLEAR
AST SERPL W P-5'-P-CCNC: 19 U/L (ref 9–39)
BACTERIA #/AREA URNS AUTO: ABNORMAL /HPF
BASE EXCESS BLDV CALC-SCNC: 1 MMOL/L (ref -2–3)
BASE EXCESS BLDV CALC-SCNC: 3 MMOL/L (ref -2–3)
BASOPHILS # BLD AUTO: 0.02 X10*3/UL (ref 0–0.1)
BASOPHILS NFR BLD AUTO: 0.2 %
BILIRUB SERPL-MCNC: 2.1 MG/DL (ref 0–1.2)
BILIRUB UR STRIP.AUTO-MCNC: NEGATIVE MG/DL
BODY SURFACE AREA: 2.23 M2
BODY TEMPERATURE: 37 DEGREES CELSIUS
BODY TEMPERATURE: 37 DEGREES CELSIUS
BUN SERPL-MCNC: 29 MG/DL (ref 6–23)
CA-I BLDV-SCNC: 1.2 MMOL/L (ref 1.1–1.33)
CALCIUM SERPL-MCNC: 7.7 MG/DL (ref 8.6–10.6)
CHLORIDE BLDV-SCNC: 101 MMOL/L (ref 98–107)
CHLORIDE SERPL-SCNC: 101 MMOL/L (ref 98–107)
CHLORIDE UR-SCNC: <15 MMOL/L
CHLORIDE/CREATININE (MMOL/G) IN URINE: NORMAL
CO2 SERPL-SCNC: 27 MMOL/L (ref 21–32)
COLOR UR: YELLOW
CREAT SERPL-MCNC: 2.42 MG/DL (ref 0.5–1.05)
CREAT UR-MCNC: 193.6 MG/DL (ref 20–320)
EGFRCR SERPLBLD CKD-EPI 2021: 23 ML/MIN/1.73M*2
EJECTION FRACTION APICAL 4 CHAMBER: 68.9
EJECTION FRACTION: 67 %
EOSINOPHIL # BLD AUTO: 0.26 X10*3/UL (ref 0–0.7)
EOSINOPHIL NFR BLD AUTO: 2.4 %
ERYTHROCYTE [DISTWIDTH] IN BLOOD BY AUTOMATED COUNT: 21.2 % (ref 11.5–14.5)
GLUCOSE BLDV-MCNC: 89 MG/DL (ref 74–99)
GLUCOSE SERPL-MCNC: 135 MG/DL (ref 74–99)
GLUCOSE UR STRIP.AUTO-MCNC: NORMAL MG/DL
HCO3 BLDV-SCNC: 30.6 MMOL/L (ref 22–26)
HCO3 BLDV-SCNC: 31.7 MMOL/L (ref 22–26)
HCT VFR BLD AUTO: 25 % (ref 36–46)
HCT VFR BLD EST: 29 % (ref 36–46)
HEMOGLOBIN A2: ABNORMAL
HEMOGLOBIN A2: ABNORMAL
HEMOGLOBIN A: ABNORMAL
HEMOGLOBIN A: ABNORMAL
HEMOGLOBIN C: 9.3 %
HEMOGLOBIN C: 9.4 %
HEMOGLOBIN F: ABNORMAL
HEMOGLOBIN F: ABNORMAL
HEMOGLOBIN IDENTIFICATION INTERPRETATION: ABNORMAL
HEMOGLOBIN IDENTIFICATION INTERPRETATION: ABNORMAL
HEMOGLOBIN S: 9.7 %
HEMOGLOBIN S: 9.8 %
HGB BLD-MCNC: 8.6 G/DL (ref 12–16)
HGB BLDV-MCNC: 9.7 G/DL (ref 12–16)
HGB RETIC QN: 21 PG (ref 28–38)
HOLD SPECIMEN: 293
HYPOCHROMIA BLD QL SMEAR: NORMAL
IMM GRANULOCYTES # BLD AUTO: 0.04 X10*3/UL (ref 0–0.7)
IMM GRANULOCYTES NFR BLD AUTO: 0.4 % (ref 0–0.9)
IMMATURE RETIC FRACTION: 18.1 %
INHALED O2 CONCENTRATION: 0 %
INHALED O2 CONCENTRATION: 40 %
INR PPP: 1.3 (ref 0.9–1.1)
KETONES UR STRIP.AUTO-MCNC: NEGATIVE MG/DL
LACTATE BLDV-SCNC: 0.6 MMOL/L (ref 0.4–2)
LDH SERPL L TO P-CCNC: 255 U/L (ref 84–246)
LEFT ATRIUM VOLUME AREA LENGTH INDEX BSA: 30.5 ML/M2
LEFT VENTRICLE INTERNAL DIMENSION DIASTOLE: 4.4 CM (ref 3.5–6)
LEUKOCYTE ESTERASE UR QL STRIP.AUTO: NEGATIVE
LYMPHOCYTES # BLD AUTO: 1.2 X10*3/UL (ref 1.2–4.8)
LYMPHOCYTES NFR BLD AUTO: 10.9 %
MAGNESIUM SERPL-MCNC: 2.33 MG/DL (ref 1.6–2.4)
MCH RBC QN AUTO: 27.7 PG (ref 26–34)
MCHC RBC AUTO-ENTMCNC: 34.4 G/DL (ref 32–36)
MCV RBC AUTO: 80 FL (ref 80–100)
MICROALBUMIN UR-MCNC: 17.6 MG/L
MICROALBUMIN/CREAT UR: 9.1 UG/MG CREAT
MITRAL VALVE E/A RATIO: 1.14
MONOCYTES # BLD AUTO: 0.66 X10*3/UL (ref 0.1–1)
MONOCYTES NFR BLD AUTO: 6 %
NEUTROPHILS # BLD AUTO: 8.79 X10*3/UL (ref 1.2–7.7)
NEUTROPHILS NFR BLD AUTO: 80.1 %
NITRITE UR QL STRIP.AUTO: NEGATIVE
NRBC BLD-RTO: 10.1 /100 WBCS (ref 0–0)
OXYHGB MFR BLDV: 71.4 % (ref 45–75)
OXYHGB MFR BLDV: 75.1 % (ref 45–75)
PATH REVIEW-HGB IDENTIFICATION: ABNORMAL
PATH REVIEW-HGB IDENTIFICATION: ABNORMAL
PCO2 BLDV: 73 MM HG (ref 41–51)
PCO2 BLDV: 74 MM HG (ref 41–51)
PH BLDV: 7.23 PH (ref 7.33–7.43)
PH BLDV: 7.24 PH (ref 7.33–7.43)
PH UR STRIP.AUTO: 5.5 [PH]
PHOSPHATE SERPL-MCNC: 4.3 MG/DL (ref 2.5–4.9)
PLATELET # BLD AUTO: 156 X10*3/UL (ref 150–450)
PO2 BLDV: 45 MM HG (ref 35–45)
PO2 BLDV: 47 MM HG (ref 35–45)
POTASSIUM BLDV-SCNC: 4.8 MMOL/L (ref 3.5–5.3)
POTASSIUM SERPL-SCNC: 4.5 MMOL/L (ref 3.5–5.3)
POTASSIUM UR-SCNC: 27 MMOL/L
POTASSIUM/CREAT UR-RTO: 14 MMOL/G CREAT
PROT SERPL-MCNC: 5.7 G/DL (ref 6.4–8.2)
PROT UR STRIP.AUTO-MCNC: ABNORMAL MG/DL
PROT UR-ACNC: 29 MG/DL (ref 5–24)
PROT/CREAT UR: 0.15 MG/MG CREAT (ref 0–0.17)
PROTHROMBIN TIME: 14.8 SECONDS (ref 9.8–12.4)
RBC # BLD AUTO: 3.11 X10*6/UL (ref 4–5.2)
RBC # UR STRIP.AUTO: ABNORMAL MG/DL
RBC #/AREA URNS AUTO: ABNORMAL /HPF
RBC MORPH BLD: NORMAL
RETICS #: 0.13 X10*6/UL (ref 0.02–0.08)
RETICS/RBC NFR AUTO: 4 % (ref 0.5–2)
RIGHT VENTRICLE FREE WALL PEAK S': 11 CM/S
SAO2 % BLDV: 75 % (ref 45–75)
SAO2 % BLDV: 79 % (ref 45–75)
SODIUM BLDV-SCNC: 134 MMOL/L (ref 136–145)
SODIUM SERPL-SCNC: 135 MMOL/L (ref 136–145)
SODIUM UR-SCNC: 19 MMOL/L
SODIUM/CREAT UR-RTO: 10 MMOL/G CREAT
SP GR UR STRIP.AUTO: 1.01
SQUAMOUS #/AREA URNS AUTO: ABNORMAL /HPF
TARGETS BLD QL SMEAR: NORMAL
UFH PPP CHRO-ACNC: 0.4 IU/ML (ref ?–1.1)
UFH PPP CHRO-ACNC: 0.5 IU/ML (ref ?–1.1)
UFH PPP CHRO-ACNC: 0.8 IU/ML (ref ?–1.1)
UFH PPP CHRO-ACNC: 0.9 IU/ML (ref ?–1.1)
UFH PPP CHRO-ACNC: 0.9 IU/ML (ref ?–1.1)
UROBILINOGEN UR STRIP.AUTO-MCNC: NORMAL MG/DL
WBC # BLD AUTO: 11 X10*3/UL (ref 4.4–11.3)
WBC #/AREA URNS AUTO: ABNORMAL /HPF

## 2025-05-09 PROCEDURE — 85520 HEPARIN ASSAY: CPT

## 2025-05-09 PROCEDURE — 2500000004 HC RX 250 GENERAL PHARMACY W/ HCPCS (ALT 636 FOR OP/ED): Mod: JZ

## 2025-05-09 PROCEDURE — 81001 URINALYSIS AUTO W/SCOPE: CPT

## 2025-05-09 PROCEDURE — 99233 SBSQ HOSP IP/OBS HIGH 50: CPT

## 2025-05-09 PROCEDURE — 94660 CPAP INITIATION&MGMT: CPT

## 2025-05-09 PROCEDURE — 83615 LACTATE (LD) (LDH) ENZYME: CPT

## 2025-05-09 PROCEDURE — 97165 OT EVAL LOW COMPLEX 30 MIN: CPT | Mod: GO

## 2025-05-09 PROCEDURE — 71045 X-RAY EXAM CHEST 1 VIEW: CPT | Performed by: STUDENT IN AN ORGANIZED HEALTH CARE EDUCATION/TRAINING PROGRAM

## 2025-05-09 PROCEDURE — 36415 COLL VENOUS BLD VENIPUNCTURE: CPT

## 2025-05-09 PROCEDURE — 2500000001 HC RX 250 WO HCPCS SELF ADMINISTERED DRUGS (ALT 637 FOR MEDICARE OP)

## 2025-05-09 PROCEDURE — 2500000002 HC RX 250 W HCPCS SELF ADMINISTERED DRUGS (ALT 637 FOR MEDICARE OP, ALT 636 FOR OP/ED)

## 2025-05-09 PROCEDURE — 2500000004 HC RX 250 GENERAL PHARMACY W/ HCPCS (ALT 636 FOR OP/ED): Mod: JZ,TB

## 2025-05-09 PROCEDURE — 82043 UR ALBUMIN QUANTITATIVE: CPT

## 2025-05-09 PROCEDURE — 85025 COMPLETE CBC W/AUTO DIFF WBC: CPT

## 2025-05-09 PROCEDURE — 71045 X-RAY EXAM CHEST 1 VIEW: CPT

## 2025-05-09 PROCEDURE — 83021 HEMOGLOBIN CHROMOTOGRAPHY: CPT

## 2025-05-09 PROCEDURE — 36512 APHERESIS RBC: CPT | Performed by: PATHOLOGY

## 2025-05-09 PROCEDURE — 1200000003 HC ONCOLOGY  ROOM WITH TELEMETRY DAILY

## 2025-05-09 PROCEDURE — 82436 ASSAY OF URINE CHLORIDE: CPT

## 2025-05-09 PROCEDURE — 84100 ASSAY OF PHOSPHORUS: CPT

## 2025-05-09 PROCEDURE — 82435 ASSAY OF BLOOD CHLORIDE: CPT

## 2025-05-09 PROCEDURE — 82805 BLOOD GASES W/O2 SATURATION: CPT

## 2025-05-09 PROCEDURE — 84156 ASSAY OF PROTEIN URINE: CPT

## 2025-05-09 PROCEDURE — 80053 COMPREHEN METABOLIC PANEL: CPT

## 2025-05-09 PROCEDURE — 97161 PT EVAL LOW COMPLEX 20 MIN: CPT | Mod: GP | Performed by: STUDENT IN AN ORGANIZED HEALTH CARE EDUCATION/TRAINING PROGRAM

## 2025-05-09 PROCEDURE — 85045 AUTOMATED RETICULOCYTE COUNT: CPT

## 2025-05-09 PROCEDURE — 83020 HEMOGLOBIN ELECTROPHORESIS: CPT | Performed by: PATHOLOGY

## 2025-05-09 PROCEDURE — 85610 PROTHROMBIN TIME: CPT

## 2025-05-09 PROCEDURE — 83735 ASSAY OF MAGNESIUM: CPT

## 2025-05-09 PROCEDURE — 2500000005 HC RX 250 GENERAL PHARMACY W/O HCPCS

## 2025-05-09 RX ORDER — DIPHENHYDRAMINE HCL 25 MG
25 CAPSULE ORAL EVERY 6 HOURS PRN
Status: DISCONTINUED | OUTPATIENT
Start: 2025-05-09 | End: 2025-05-15 | Stop reason: HOSPADM

## 2025-05-09 RX ORDER — CYCLOBENZAPRINE HCL 10 MG
5 TABLET ORAL 3 TIMES DAILY PRN
Status: DISCONTINUED | OUTPATIENT
Start: 2025-05-09 | End: 2025-05-09

## 2025-05-09 RX ORDER — HYDROMORPHONE HYDROCHLORIDE 1 MG/ML
1 INJECTION, SOLUTION INTRAMUSCULAR; INTRAVENOUS; SUBCUTANEOUS EVERY 4 HOURS PRN
Status: DISCONTINUED | OUTPATIENT
Start: 2025-05-09 | End: 2025-05-13

## 2025-05-09 RX ADMIN — WARFARIN SODIUM 5 MG: 5 TABLET ORAL at 17:18

## 2025-05-09 RX ADMIN — SODIUM ZIRCONIUM CYCLOSILICATE 10 G: 10 POWDER, FOR SUSPENSION ORAL at 18:30

## 2025-05-09 RX ADMIN — AZITHROMYCIN DIHYDRATE 500 MG: 500 TABLET ORAL at 14:09

## 2025-05-09 RX ADMIN — OXYCODONE 10 MG: 5 TABLET ORAL at 17:17

## 2025-05-09 RX ADMIN — HYDROMORPHONE HYDROCHLORIDE 1 MG: 1 INJECTION, SOLUTION INTRAMUSCULAR; INTRAVENOUS; SUBCUTANEOUS at 18:34

## 2025-05-09 RX ADMIN — CYCLOBENZAPRINE 10 MG: 10 TABLET, FILM COATED ORAL at 21:26

## 2025-05-09 RX ADMIN — CETIRIZINE HYDROCHLORIDE 10 MG: 10 TABLET, FILM COATED ORAL at 08:39

## 2025-05-09 RX ADMIN — ATROPINE SULFATE 1 DROP: 10 SOLUTION/ DROPS OPHTHALMIC at 21:26

## 2025-05-09 RX ADMIN — FOLIC ACID 1 MG: 1 TABLET ORAL at 08:38

## 2025-05-09 RX ADMIN — OXYCODONE HYDROCHLORIDE AND ACETAMINOPHEN 500 MG: 500 TABLET ORAL at 08:38

## 2025-05-09 RX ADMIN — SENNOSIDES AND DOCUSATE SODIUM 1 TABLET: 50; 8.6 TABLET ORAL at 08:38

## 2025-05-09 RX ADMIN — DIPHENHYDRAMINE HYDROCHLORIDE 25 MG: 25 CAPSULE ORAL at 21:26

## 2025-05-09 RX ADMIN — OXYCODONE 10 MG: 5 TABLET ORAL at 13:57

## 2025-05-09 RX ADMIN — CYCLOBENZAPRINE 10 MG: 10 TABLET, FILM COATED ORAL at 13:57

## 2025-05-09 RX ADMIN — SODIUM ZIRCONIUM CYCLOSILICATE 10 G: 10 POWDER, FOR SUSPENSION ORAL at 10:48

## 2025-05-09 RX ADMIN — Medication 4 L/MIN: at 21:27

## 2025-05-09 RX ADMIN — CEFTRIAXONE SODIUM 2 G: 2 INJECTION, SOLUTION INTRAVENOUS at 21:27

## 2025-05-09 RX ADMIN — HEPARIN SODIUM 1100 UNITS/HR: 10000 INJECTION, SOLUTION INTRAVENOUS at 10:34

## 2025-05-09 RX ADMIN — OXYCODONE 10 MG: 5 TABLET ORAL at 08:39

## 2025-05-09 RX ADMIN — OXYCODONE 10 MG: 5 TABLET ORAL at 21:26

## 2025-05-09 RX ADMIN — OXYCODONE 5 MG: 5 TABLET ORAL at 02:05

## 2025-05-09 ASSESSMENT — COGNITIVE AND FUNCTIONAL STATUS - GENERAL
TURNING FROM BACK TO SIDE WHILE IN FLAT BAD: A LITTLE
MOBILITY SCORE: 18
STANDING UP FROM CHAIR USING ARMS: A LITTLE
DAILY ACTIVITIY SCORE: 22
TOILETING: A LITTLE
HELP NEEDED FOR BATHING: A LITTLE
MOVING TO AND FROM BED TO CHAIR: A LITTLE
CLIMB 3 TO 5 STEPS WITH RAILING: A LITTLE
MOVING FROM LYING ON BACK TO SITTING ON SIDE OF FLAT BED WITH BEDRAILS: A LITTLE
WALKING IN HOSPITAL ROOM: A LITTLE

## 2025-05-09 ASSESSMENT — PAIN - FUNCTIONAL ASSESSMENT
PAIN_FUNCTIONAL_ASSESSMENT: 0-10

## 2025-05-09 ASSESSMENT — PAIN SCALES - GENERAL
PAINLEVEL_OUTOF10: 7
PAINLEVEL_OUTOF10: 0 - NO PAIN
PAINLEVEL_OUTOF10: 8
PAINLEVEL_OUTOF10: 0 - NO PAIN
PAINLEVEL_OUTOF10: 8
PAINLEVEL_OUTOF10: 7
PAINLEVEL_OUTOF10: 8
PAINLEVEL_OUTOF10: 6
PAINLEVEL_OUTOF10: 5 - MODERATE PAIN
PAINLEVEL_OUTOF10: 10 - WORST POSSIBLE PAIN
PAINLEVEL_OUTOF10: 6
PAINLEVEL_OUTOF10: 0 - NO PAIN
PAINLEVEL_OUTOF10: 0 - NO PAIN

## 2025-05-09 ASSESSMENT — ACTIVITIES OF DAILY LIVING (ADL)
BATHING_ASSISTANCE: STAND BY
ADL_ASSISTANCE: INDEPENDENT
ADL_ASSISTANCE: INDEPENDENT

## 2025-05-09 ASSESSMENT — PAIN DESCRIPTION - LOCATION
LOCATION: OTHER (COMMENT)
LOCATION: GENERALIZED

## 2025-05-09 NOTE — PROGRESS NOTES
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    She initially presented with progressive neck and body pain, facial swelling, and hypoxia (sat 82% on RA). Workup in the ED revealed a new right lower lobe infiltrate and leukocytosis concerning for community-acquired pneumonia; ceftriaxone and azithromycin were started. She was also found to have acute kidney injury (Cr 2.75 from baseline 1.2), subtherapeutic INR of 1.5, and presumed pain crisis.    Overnight of 5/8, a rapid response was called for hypotension (BP 76/52). VBG showed severe respiratory acidosis (pH 7.21, pCO? 69) with lactate 1.0. She had received metoprolol and opioids prior to the event. Given history of thromboembolism, a heparin drip was started, and a DVT ultrasound and stat TTE were ordered. She was transferred to the MICU for further management.    In the MICU, she remained hemodynamically stable and alert. She continued to report diffuse pain, and pleuritic chest discomfort. Imaging and clinical status were consistent with acute chest syndrome. She had hypercapnia with respiratory acidosis (pH lynn 7.15, CO? 86), which improved with BiPAP therapy. She remains on BiPAP as needed for ventilatory support whenever she takes a nap or sleeping. Exchange transfusion was indicated and transfusion medicine was consulted. A femoral catheter was placed and RCE was performed. S/p RCE on 5/8. Kidney function improved.     C: Code Status/DPOA Info/Goals of Care/ACP Note    Full Code  DPOA/Contact Number: Mother  Tati Narvaez [phone]     U: Unprescribing & Pertinent High-Risk Medications    Changes to home meds: none     Anticoagulation: yes on heparin gtt     Antibiotics:   [] N/A - no current planned antimicrobioals  [x]  Ceftriaxone and azithromycin indication CAP start date 5/8/25  planned duration 5    P: Pending Tests at the Time of Transfer   DVT ultrasound lower and upper ext pending       A: Active consultants, including Rehab:   [x]  Subspecialty Consultants: nephrology signed out   []  PT  []  OT  []  SLP  []  Wound Care    U: Uncertainty Measure/Diagnostic Pause:    Working diagnosis at the time of transfer acute chest syndrome though ddx includes CAP and pain crisis      Diagnosis Degree of Certainty: 1. High degree of certainty about the clinical diagnosis.     S: Summary of Major Problems and To-Dos:   Senait Narvaez is a 55 y.o. 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 Treating for CAP with CTX/azithro and pain control for pain crisis, undergoing CHARLES workup. On 5/8 transferred to MICU for acute chest syndrome, and the patient is experiencing additional acute kidney injury. S/p blood exchange transfusion given sickle cell crisis.         Follow ups and to dos:  - finish AB course for total 5 days started on ceftriaxone and azithromycin on 5/8 for CAP showed consolidation in the CXR   - pt should be put on CPAP nightly and whenever she takes nap with setting of 16/8   - nephrology outpatient follow up.    - has soft BP   CNS  #somnolence - improved  ::Likely 2/2 hypercapnia vs hypotension  -treatment as  below     PULM  #AHRF - improved  #acute hypercapnic respiratory failure -improved  #cf acute chest syndrome  #cf CAP  #pulmonary HTN  :: VBG showed PH:7.15->7.20->7.23 , Co2 of 86->75->71 with bicarb of 28, respiratory acidosis   ::possibly iso opioid use   -BiPAP if needed, will follow VBGs  -CAP treatment         CV  #hypotension -improved  ::sepsis vs obstructive vs cardiogenic iso R heart failure  ::Lactate normal during rapid 5/8  ::HDS on arrival to MICU  :: TTE: HFpEF with EF of 67% right ventricular overloaded , severely enlarged RV   -Stat UE/LE DVT US pending      #HFpEF  #pHTN  #troponinemia   ::Last TTE today: HFpEF with EF of 67% right ventricular overloaded , severely enlarged RV   ::troponin leak likely iso demand  -on lasix 20 every other day at home  - trop 320 ->310   -hold diuresis for hypotension        #hx of SVT  -hold metoprolol while hypotensive  -tele        GI  CHOLO     RENAL/  #CHARLES  #hyperkalemia  ::Baseline Cr ~1.0, 2.75>3.76->386 today   ::Some concern for kidney involvement of sickle cell crisis  -lokelma 10g q8  -stat RBUS with doppler  -urine electrolytes  -avoid nephrotoxins  -sickle cell crisis management as below  -nephrology signed off and will follow up outpt   ID  #cf CAP  -cw CTX/azithro  -fu blood cultures negative to date  -fu urine antigens negative         HEME/ONC  #sickle cell crisis  #HgbSC SCD  #R femoral head AVN  -holding off on fluids given pulmonary hypertension/right heart failure  -hemoglobin identification lab showed HbS 22% and HbC of 16%  -oxy 5 prn q 4h for moderate pain, 10 prn q4h for severe pain  -dilaudid 1 mg prn q3hr for breakthrough pain   -PO Benadryl 25 mg Q 6H PRN pruritus  -Continue home Zofran PRN  -Flexeril 10 mg every 8 hours PRN  -Continue home Folate and MVI on admit  -Nita-colace/miralax for constipation prevention  -s/p RCE based on worsening kidney function and acute chest syndrome         #hx of DVT/PE  #hx of SVC syndrome  ::On warfarin  at home, INR 1.5 on admission  -heparin gtt  -UE/LE DVT US  -TTE as above     ENDO  CHOLO     MSK/Skin  CHOLO  E: Exam, including Lines/Drains/Airways & Data Review:   Physical exam:  Constitutional:       Appearance: Normal appearance. She is obese.     HENT:      Mouth/Throat:      Mouth: Mucous membranes are moist.      Pharynx: Oropharynx is clear.   Eyes:      General: Gaze aligned appropriately.   Neck:      Trachea: Trachea normal.   Cardiovascular:      Rate and Rhythm: Normal rate and regular rhythm.      Pulses:           Radial pulses are 2+ on the right side.        Dorsalis pedis pulses are 2+ on the right side.        Posterior tibial pulses are 2+ on the right side.      Heart sounds: Normal heart sounds, S1 normal and S2 normal.   Pulmonary:      Effort: Prolonged expiration present.      Breath sounds: Wheezing and rhonchi present.   Abdominal:      General: Bowel sounds are normal.      Palpations: Abdomen is soft.   Musculoskeletal:         General: Normal range of motion.      Cervical back: Neck supple. Muscular tenderness present.   Skin:     General: Skin is warm.   Neurological:      General: No focal deficit present.      Mental Status: She is alert and oriented to person, place, and time.   Psychiatric:         Mood and Affect: Mood normal.         Behavior: Behavior normal.         Thought Content: Thought content normal.      -----------------------------------------------------------------------------   Difficult airway? No  Lines/drains assessed for removal? Yes, describe:  hemodialysis line on right groin area keep it to the last day of hospitalization     Within 30 minutes of the patient physically leaving the floor, a Floor Readiness Note needs to be placed with updated vitals.

## 2025-05-09 NOTE — PROCEDURES
Trialysis Line Placement    A time out was performed. The patient was placed in correct position.  The RIGHT GROIN region was prepped and draped in sterile fashion using chlorhexidine scrub. Anesthesia was achieved with 1% lidocaine. Ultrasound guidance was used throughout the procedure to identify the correct vein. The introducer needle was inserted with ultrasound guidance into the RIGHT FEMORAL vein. Venous blood was withdrawn. Syringe was removed and a guidewire was advanced into the introducer needle. Guidewire position was confirmed in the vein with ultrasound guidance. A small incision was made at the skin surface with a scalpel and the introducer needle was exchanged for a dilator over the guidewire. Given the multiple prior line insertion, it  was difficult to to advance the dilator at least three times despite appropriate dilation but after further dilation was done, the dilator was exchanged over the wire for a triple lumen, central venous catheter. The wire was removed and the catheter was sutured in place.        Dr grace was present throughout the whole procedure

## 2025-05-09 NOTE — PROGRESS NOTES
Physical Therapy    Physical Therapy Evaluation    Patient Name: Senait Narvaez  MRN: [id number]  Room: 02/02  Today's Date: 5/9/2025   Time Calculation  Start Time: 0935  Stop Time: 0955  Time Calculation (min): 20 min    Assessment/Plan   PT Assessment  Barriers to Discharge Home: No anticipated barriers  End of Session Communication: Bedside nurse  End of Session Patient Position: Bed, 3 rail up, Alarm off, not on at start of session  IP OR SWING BED PT PLAN  Inpatient or Swing Bed: Inpatient  PT Plan: PT Eval only  PT Eval Only Reason: No acute PT needs identified  PT Frequency: PT eval only  PT Recommended Transfer Status: Stand by assist    Subjective      General Visit Information:  Subjective: Patient is alert, agreeable to PT Feels close to baseline but still has some residual pain.  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), CAN, nocturnal hypoxia, chronic constipation, and known L breast mass (likely benign s/p bx 1/31; follows breast clinic)  Prior to Session Communication: Bedside nurse  Patient Position Received: Bed, 3 rail up, Alarm off, not on at start of session  Family/Caregiver Present: No   Home Living:  Home Living  Type of Home: Apartment  Lives With: Adult children  Home Adaptive Equipment: Walker rolling or standard  Home Layout: One level  Home Access: Elevator  Prior Level of Function:  Prior Function Per Pt/Caregiver Report  Level of Central: Independent with ADLs and functional transfers  ADL Assistance: Independent  Homemaking Assistance: Independent  Ambulatory Assistance: Independent  Vocational: Retired  Precautions:  Precautions  Medical Precautions: Fall precautions, Oxygen  therapy device and L/min  Vital Signs:  Pre Vitals  Vital Signs  Heart Rate: 85  SpO2: 100 %  BP: 94/53   Post Vitals  Vital Signs  Heart Rate: 88  SpO2: 100 %  BP: 84/54   Lines/Tubes/Drains:  Hemodialysis Cath 05/08/25 Right Femoral (Active)   Number of days: 0     Medical Gas Therapy: Supplemental oxygen  Medical Gas Delivery Method: CPAP/Bi-PAP mask  Continuous Medications/Drips:  Continuous Medications[1]    Objective   Pain:  Pain Assessment  0-10 (Numeric) Pain Score: 5 - Moderate pain (5 post)  Pain Type: Chronic pain  Pain Location: Generalized    Cognition:  Cognition  Overall Cognitive Status: Within Functional Limits  Orientation Level: Oriented X4    General Assessments:  Extremity/Trunk Assessments:  Tone: No abnormalities noted  Sensation  Light Touch: No apparent deficits  Coordination  Movements are Fluid and Coordinated: Yes  Upper Extremity  ROM: WNL  Strength:WFL  Lower Extremity  ROM: WNL  Strength: WFL   Sitting Static Balance Normal  Sitting Dynamic Balance Normal  Standing Static Balance Normal  Standing Dynamic Balance Normal    Functional Assessments:  Bed Mobility  Bed Mobility: Yes  Bed Mobility 1  Bed Mobility 1: Supine to sitting  Level of Assistance 1: Close supervision    Transfers  Transfer: Yes  Transfer 1  Transfer From 1: Sit to  Transfer to 1: Stand  Transfer Level of Assistance 1: Close supervision  Transfers 2  Transfer From 2: Stand to  Transfer to 2: Sit  Transfer Level of Assistance 2: Close supervision  Transfers 3  Transfer From 3: Bed to  Transfer to 3: Chair with arms  Transfer Level of Assistance 3: Close supervision  Transfers 4  Transfer From 4: Chair with arms to  Transfer to 4: Chair with arms  Transfer Level of Assistance 4: Close supervision    Ambulation/Gait Training  Ambulation/Gait Training Performed: Yes  Ambulation/Gait Training 1  Surface 1: Level tile  Device 1: No device  Assistance 1: Close supervision  Quality of Gait 1:  (WFL)  Comments/Distance (ft)  1: 20' x 3, 60              Outcome Measures:  [facility] Basic Mobility  Turning from your back to your side while in a flat bed without using bedrails: A little  Moving from lying on your back to sitting on the side of a flat bed without using bedrails: A little  Moving to and from bed to chair (including a wheelchair): A little  Standing up from a chair using your arms (e.g. wheelchair or bedside chair): A little  To walk in hospital room: A little  Climbing 3-5 steps with railing: A little  Basic Mobility - Total Score: 18      FSS-ICU  Ambulation: Walks >/ or equal to 50 feet with any assistance x1  Rolling: Supervision or set-up only  Sitting: Supervision or set-up only  Transfer Sit-to-Stand: Supervision or set-up only  Transfer Supine-to-Sit: Supervision or set-up only  Total Score: 22  ICU Mobility Screen  ICU Mobility Scale: Walking with assistance of 1 person  [place]  Sitting Balance: Steady, safe  Arises: Able, uses arms to help  Attempts to Arise: Able to arise, one attempt  Immediate Standing Balance (First 5 Seconds): Steady without walker or other support  Standing Balance: Narrow stance without support  Nudged: Steady without walker or other support  Eyes Closed: Steady  Turned 360 Degrees: Steadiness: Steady  Turned 360 Degrees: Continuity of Steps: Continuous  Sitting Down: Safe, smooth motion  Balance Score: 15  Initiation of Gait: No hesitancy  Step Height: R Swing Foot: Right foot complete clears floor  Step Length: R Swing Foot: Passes left stance foot  Step Height: L Swing Foot: Left foot complete clears floor  Step Length: L Swing Foot: Passes right stance foot  Step Symmetry: Right and left step appear equal  Step Continuity: Steps appear continuous  Path: Straight without walking aid  Trunk: No sway, no flexion, no use of arms, no walking aid  Walking Time: Heels almost touching while walking  Gait Score: 12  Total Score: 27          Encounter Problems       Encounter Problems (Active)        Pain - Adult            Assessment: Patient presents 2/2 SCC.  Currently supervision for mobility with low falls risk based on gait and TUG score of 11s.  At preadmission functional baseline.  No further acute PT warranted at this time.  Please continue mobility during acute stay with nursing staff.  PT to sign off with no further needs.      Education Documentation  Precautions, taught by Mahendra Cook PT at 5/9/2025 11:30 AM.  Learner: Patient  Readiness: Acceptance  Method: Explanation  Response: Demonstrated Understanding, Verbalizes Understanding    Body Mechanics, taught by Mahendra Cook PT at 5/9/2025 11:30 AM.  Learner: Patient  Readiness: Acceptance  Method: Explanation  Response: Demonstrated Understanding, Verbalizes Understanding    Mobility Training, taught by Mahendra Cook PT at 5/9/2025 11:30 AM.  Learner: Patient  Readiness: Acceptance  Method: Explanation  Response: Demonstrated Understanding, Verbalizes Understanding    Education Comments  No comments found.          05/09/25 at 11:31 AM   Mahendra Cook, OCTAVIO   Rehab Office: [phone]                 [1] heparin, 0-4,500 Units/hr, Last Rate: 1,100 Units/hr (05/09/25 1100)

## 2025-05-09 NOTE — PROGRESS NOTES
Occupational Therapy    Evaluation    Patient Name: Senait Narvaez  MRN: [id number]  Today's Date: 5/9/2025  Room: 02/02  Time Calculation  Start Time: 1215  Stop Time: 1232  Time Calculation (min): 17 min    Assessment  IP OT Assessment  OT Assessment: Pt appears near functional baseline from OT standpoint. No OT needs identifed.  Prognosis: Excellent  Barriers to Discharge Home: No anticipated barriers  Evaluation/Treatment Tolerance: Patient tolerated treatment well  Medical Staff Made Aware: Yes  End of Session Communication: Bedside nurse  End of Session Patient Position: Bed, 3 rail up, Alarm off, not on at start of session  Plan:  Inpatient Plan  No Skilled OT: No acute OT goals identified  OT Frequency: OT eval only  OT Discharge Recommendations: No OT needed after discharge  OT Recommended Transfer Status: Independent  OT - OK to Discharge: Yes  OT Assessment  Prognosis: Excellent  Evaluation/Treatment Tolerance: Patient tolerated treatment well  Medical Staff Made Aware: Yes    Subjective   Current Problem:  1. Pneumonia due to infectious organism, unspecified laterality, unspecified part of lung  DISCONTINUED: azithromycin (Zithromax) injection 500 mg    DISCONTINUED: azithromycin (Zithromax) injection 500 mg      2. Cardiomyopathy, ischemic  Transthoracic Echo (TTE) Limited    Transthoracic Echo (TTE) Limited      3. Deep vein thrombosis (DVT) of distal vein of lower extremity, unspecified chronicity, unspecified laterality        4. Personal history of other venous thrombosis and embolism  Lower extremity venous duplex bilateral    Upper extremity venous duplex bilateral    Lower extremity venous duplex bilateral    Upper extremity venous duplex bilateral      5. Hypotension, unspecified hypotension type  Transthoracic Echo (TTE) Limited    Transthoracic Echo (TTE) Limited      6. Acute sickle cell crisis (Multi)  Central Line    Central Line        General:  Reason for Referral: 54 y/o female admitted for  generalized pain and desaturation.  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 She is able to complete all tasks asked of her.   Precautions:  Medical Precautions: Fall precautions, Oxygen therapy device and L/min (4LO2)  Vital Signs:   Date/Time Vitals Session Patient Position Pulse Resp SpO2 BP MAP (mmHg)    05/09/25 1100 --  --  84  22  98 %  84/54  61     05/09/25 1123 --  --  --  21  --  --  --     05/09/25 1200 --  --  72  17  98 %  --  --     05/09/25 1215 --  Sitting  75  14  95 %  137/67  84           Pain:  Pain Assessment  Pain Assessment: 0-10  0-10 (Numeric) Pain Score: 0 - No pain  Lines/Tubes/Drains:  Hemodialysis Cath 05/08/25 Right Femoral (Active)   Number of days: 0         Objective   Cognition:  Overall Cognitive Status: Within Functional Limits  Orientation Level: Oriented X4     Confusion Assessment Method (CAM)  Acute Onset and Fluctuating Course (1A): No  Love Agitation Sedation Scale  Love Agitation Sedation Scale (RASS): Alert and calm  Home Living:  Type of Home: Apartment  Lives With: Adult children  Home Adaptive Equipment: Walker rolling or standard  Home Layout: One level  Home Access: Elevator   Prior Function:  Level of [place]: Independent with ADLs and functional transfers  Receives Help From: Family, Friends  ADL Assistance:  Independent  Homemaking Assistance: Independent  Ambulatory Assistance: Independent  Vocational: Retired  IADL History:     ADL:  Grooming Assistance: Independent  Bathing Assistance: Stand by  UE Dressing Assistance: Independent  LE Dressing Assistance: Stand by  Toileting Assistance with Device: Stand by  Activity Tolerance:  Endurance: Endurance does not limit participation in activity  Early Mobility/Exercise Safety Screen: Proceed with mobilization - No exclusion criteria met  Balance:  Static Sitting Balance  Static Sitting-Level of Assistance: Independent  Bed Mobility/Transfers: Bed Mobility  Bed Mobility: Yes  Bed Mobility 1  Bed Mobility 1: Supine to sitting  Level of Assistance 1: Distant supervision  Functional Mobility  Functional Mobility Performed: Yes  Functional Mobility 1  Device 1: No device  Assistance 1: Close supervision  Comments 1: Pt ambulated through room and hallway >100' pushing IV pole. No unsteadiness or LOB.   and Transfers  Transfer: Yes  Transfer 1  Transfer From 1: Sit to  Transfer to 1: Stand  Technique 1: Sit to stand, Stand to sit  Transfer Level of Assistance 1: Distant supervision  IADL's:      Vision:     and Vision - Complex Assessment  Ocular Range of Motion: Within Functional Limits  Sensation:  Light Touch: No apparent deficits  Strength:  Strength Comments: BUE strength WFL  Perception:  Inattention/Neglect: Appears intact  Coordination:  Movements are Fluid and Coordinated: Yes   Hand Function:  Hand Function  Gross Grasp: Functional  Coordination: Functional  Extremities:  ,  ,  , and      Outcome Measures: [facility] Daily Activity  Putting on and taking off regular lower body clothing: None  Bathing (including washing, rinsing, drying): A little  Putting on and taking off regular upper body clothing: None  Toileting, which includes using toilet, bedpan or urinal: A little  Taking care of personal grooming such as brushing teeth: None  Eating Meals: None  Daily Activity -  Total Score: 22    Confusion Assessment Method-ICU (CAM-ICU)  Feature 1: Acute Onset or Fluctuating Course: Negative  Feature 2: Inattention: Negative  Feature 3: Altered Level of Consciousness: Negative  Overall CAM-ICU: Negative   ICU Mobility Screen  Early Mobility/Exercise Safety Screen: Proceed with mobilization - No exclusion criteria met,   Love Agitation Sedation Scale  Love Agitation Sedation Scale (RASS): Alert and calm      Education Documentation  Body Mechanics, taught by Marlene Lizarraga OT at 5/9/2025 12:52 PM.  Learner: Patient  Readiness: Acceptance  Method: Explanation  Response: Verbalizes Understanding    Precautions, taught by Marlene Lizarraga OT at 5/9/2025 12:52 PM.  Learner: Patient  Readiness: Acceptance  Method: Explanation  Response: Verbalizes Understanding    ADL Training, taught by Marlene Lizraraga OT at 5/9/2025 12:52 PM.  Learner: Patient  Readiness: Acceptance  Method: Explanation  Response: Verbalizes Understanding    Education Comments  No comments found.          05/09/25 at 12:55 PM   Marlene Lizarraga OT   Rehab Office: [phone]

## 2025-05-09 NOTE — PROGRESS NOTES
"Senait Narvaez is a 55 y.o. female on day 2 of admission presenting with Pneumonia due to infectious organism, unspecified laterality, unspecified part of lung.    Subjective   Patient transferred down from MICU and seen at bedside with her family present. Patient states that she is feeling well overall. States that she is having generalized pain which she rates 7/10. Denies any N/V/D/C, fever/chills, SOB, CP or heart palpitations.         Objective     Physical Exam  Constitutional:       Appearance: She is obese.   HENT:      Head: Normocephalic.      Right Ear: Tympanic membrane normal.      Nose: Nose normal.      Mouth/Throat:      Mouth: Mucous membranes are moist.   Eyes:      Pupils: Pupils are equal, round, and reactive to light.   Cardiovascular:      Rate and Rhythm: Normal rate.      Pulses: Normal pulses.   Pulmonary:      Effort: Pulmonary effort is normal.   Abdominal:      Palpations: Abdomen is soft.   Musculoskeletal:         General: Normal range of motion.      Cervical back: Normal range of motion.   Skin:     General: Skin is warm.      Capillary Refill: Capillary refill takes less than 2 seconds.   Neurological:      General: No focal deficit present.      Mental Status: She is alert.   Psychiatric:         Mood and Affect: Mood normal.         Behavior: Behavior normal.         Last Recorded Vitals  Blood pressure 146/71, pulse 74, temperature 36.1 °C (97 °F), temperature source Temporal, resp. rate 12, height 1.651 m (5' 5\"), weight 109 kg (239 lb 13.8 oz), SpO2 100%.  Intake/Output last 3 Shifts:  I/O last 3 completed shifts:  In: 2298.8 (21.1 mL/kg) [I.V.:288.8 (2.7 mL/kg); Other:2010]  Out: 2891 (26.6 mL/kg) [Urine:900 (0.2 mL/kg/hr); Other:1991]  Weight: 108.8 kg     Relevant Results               This patient has a central line   Reason for the central line remaining today? Dialysis/Hemapheresis    Assessment & Plan  Pneumonia due to infectious organism, unspecified laterality, unspecified " "part of lung    Senait Narvaez is a 55 y.o. 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 5[facility]7  with c/o pain \"all over\"  despite home pain regimen. On arrival found to be hypoxic to 82% on RA, placed on 2L O2 via NC. Patient admitted for pain control. CXR on admit showing consolidation vs atelectasis. Started on Azithromycin and Ceftriaxone (5/7-current). Later in the night patient became hypotensive and was transferred to MICU. Patient also having CHARLES on CKD at the time. Patient received exchange transfusion 5/8. Nephrology consult-appreciate recs, On 5/9 hypotension improved and patients mental status improved so she transferred to [place] [id number] and was transitioned to [facility] service. Discharge pending improvement in CHARLES and pain     # Hb SC disease   # Acute on chronic SCD related pain crisis  - currently on q6 week RBC exchange transfusions (most recent outpatient 4/18 and inpatient 5/8)  - [facility] reviewed, last updated 4/16/25  - OARRS reviewed, no aberrant behavior noted   - Apheresis line in place from exchange   - Continue oxycodone 5mg PRN for moderate pain, and 10mg PRN for severe pain.  dilaudid 1 mg IV q4h PRN for breakthrough pain  - Cyclobenzaprine 10 mg every 8 hours prn   - Bowel regimen for opioid induced constipation with Senna 2tabs BID and Miralax daily, PO Benadryl 25 mg q6h PRN for opioid-induced pruritus, PO Zofran 8 mg q8h PRN for opioid-induced nausea  - continue 2L night time oxygen   - c/w home folic acid 1 mg daily  - Utox: + cannabinoid (3/4)   - IS encouraged     #AHRF   #acute hypercapnic respiratory failure  #cf acute chest syndrome  #cf CAP  #pulmonary HTN  :: VBG showed " PH:7.15->7.20->7.23 , Co2 of 86->75->71 with bicarb of 28, respiratory acidosis   ::possibly iso opioid use   -CPAP ordered HS  -CAP treatment as above     #HFpEF  #pHTN  #troponinemia   ::Last TTE today: HFpEF with EF of 67% right ventricular overloaded , severely enlarged RV   ::troponin leak likely iso demand  - on lasix 20 every other day at home  - trop 320 ->310     #hx of SVT  -hold metoprolol while hypotensive  -tele    #CHARLES  #hyperkalemia  ::Baseline Cr ~1.0, 2.75>3.76->386 today   ::Some concern for kidney involvement of sickle cell crisis  -lokelma 10g q8  - urine electrolytes  - avoid nephrotoxins  - sickle cell crisis management as below  - nephrology consulted recs appreciated    #hx of DVT/PE  #hx of SVC syndrome  ::On warfarin at home, INR 1.5 on admission  - heparin gtt  - Warfarin restarted 5/9 will need to bridge with heparin gtt     Dispo   - Discharge after warfarin bridge and improvement in CHARLES and pain   - FUV ophtho 5/21, requested closer FUV with Dr. Whaley, cards 8/25  - Emergency contact Damian Narvaez [phone]        I spent 60 minutes in the professional and overall care of this patient.      Rach Saleh, APRN-CNP

## 2025-05-09 NOTE — SIGNIFICANT EVENT
Floor Readiness Note       I, personally, evaluated Senait Narvaez prior to transfer to the floor, including reviewing all current laboratory and imaging studies. The patient remains appropriate for transfer to the floor. Bedside nurse and respiratory therapy are also in agreement of patient's readiness for the floor.     Brief summary:  Senait Narvaez is a 55 y.o. female who was admitted to the MICU on 5/8/25 for acute chest syndrome. They have been treated with RCE.    Updated focused Physical Exam:    Constitutional:       Appearance: Normal appearance. She is obese.   Cardiovascular:      Rate and Rhythm: Normal rate and regular rhythm.   Pulmonary:      Effort: Prolonged expiration present.      Breath sounds:  rhonchi present.   Abdominal:      General: Bowel sounds are normal.      Palpations: Abdomen is soft.       Current Vital Signs:  Heart Rate: 72 (05/09/25 1200 : User, System Default)  BP: 84/54 (05/09/25 1100 : Samantha Maya RN)  Temp: 36 °C (96.8 °F) (05/09/25 1200 : Diane Mills)  Resp: 17 (05/09/25 1200 : User, System Default)  SpO2: 98 % (05/09/25 1200 : User, System Default)    Relevant updates since rounds:  NONE    Accepting team, TATI, received verbal sign out and the Provider Care team/Attending has been updated. Bedside nurse will now call accepting nurse for report and patient will be transferred to [facility] Bjorn Whiting MD

## 2025-05-09 NOTE — PROGRESS NOTES
NEPHROLOGY FOLLOW UP NOTE    Senait Narvaez   55 y.o.      MRN/Room: [id number]/02/[id number]    Subjective: Patient reports improving chest pain and breathing.  Denies fever, chills, abdominal pain.  Reports making good urine    Objective:     Meds:   ascorbic acid, 500 mg, Daily  atropine, 1 drop, Nightly  azithromycin, 500 mg, q24h BEATA  cefTRIAXone, 2 g, q24h  cetirizine, 10 mg, Daily  folic acid, 1 mg, Daily  [Held by provider] furosemide, 20 mg, Every other day  [Held by provider] metoprolol tartrate, 25 mg, BID  oxygen, , Continuous - Inhalation  sennosides-docusate sodium, 2 tablet, BID  sodium zirconium cyclosilicate, 10 g, q8h  [Held by provider] warfarin, 5 mg, Once per day on Sunday Monday Wednesday Thursday Friday Saturday  [Held by provider] warfarin, 7.5 mg, Every Tuesday      heparin, Last Rate: 1,300 Units/hr (05/09/25 0800)      albuterol, 2 puff, q6h PRN  alteplase, 2 mg, PRN  cyclobenzaprine, 10 mg, TID PRN  fluticasone, 2 spray, Daily PRN  heparin, 3,000-6,000 Units, q4h PRN  naloxone, 0.2 mg, q5 min PRN  ondansetron, 4 mg, q8h PRN  oxyCODONE, 10 mg, q4h PRN  oxyCODONE, 5 mg, q4h PRN  oxygen, , Continuous PRN - O2/gases        Vitals:    05/09/25 0900   BP:    Pulse: 83   Resp: 17   Temp:    SpO2: 100%          Intake/Output Summary (Last 24 hours) at 5/9/2025 0926  Last data filed at 5/9/2025 0800  Gross per 24 hour   Intake 2256.67 ml   Output 3191 ml   Net -934.33 ml     General appearance: AAOx3. No distress  Eyes: non-icteric  Skin: no apparent rash  Heart: regular. no rub  Lungs: CTA bilat.  no wheezing/crackles  Abdomen: soft, nt/nd  Extremities: no edema bilat  : no Slater  Neuro: No FND, no asterixis   ACCESS: PIV, Trialysis line     Blood Labs:  Results for orders placed or performed during the hospital encounter of 05/07/25 (from the past 24 hours)   Blood Gas Venous Full Panel   Result Value Ref Range    POCT pH, Venous 7.23 (LL) 7.33 - 7.43 pH    POCT pCO2, Venous 71 (HH) 41 - 51 mm Hg    POCT  pO2, Venous 26 (L) 35 - 45 mm Hg    POCT SO2, Venous 32 (L) 45 - 75 %    POCT Oxy Hemoglobin, Venous 30.6 (L) 45.0 - 75.0 %    POCT Hematocrit Calculated, Venous 26.0 (L) 36.0 - 46.0 %    POCT Sodium, Venous 132 (L) 136 - 145 mmol/L    POCT Potassium, Venous 5.1 3.5 - 5.3 mmol/L    POCT Chloride, Venous 100 98 - 107 mmol/L    POCT Ionized Calicum, Venous 1.14 1.10 - 1.33 mmol/L    POCT Glucose, Venous 99 74 - 99 mg/dL    POCT Lactate, Venous 0.6 0.4 - 2.0 mmol/L    POCT Base Excess, Venous 1.3 -2.0 - 3.0 mmol/L    POCT HCO3 Calculated, Venous 29.7 (H) 22.0 - 26.0 mmol/L    POCT Hemoglobin, Venous 8.8 (L) 12.0 - 16.0 g/dL    POCT Anion Gap, Venous 7.0 (L) 10.0 - 25.0 mmol/L    Patient Temperature 37.0 degrees Celsius    FiO2 0 %   Prepare RBC: 6 Units   Result Value Ref Range    PRODUCT CODE Y9893K56     Unit Number [id number]     Unit ABO O     Unit RH NEG     XM INTEP COMP     Dispense Status TR     Blood Expiration Date 5/20/2025 11:59:00 PM EDT     PRODUCT BLOOD TYPE 9500     UNIT VOLUME 350     PRODUCT CODE J2042I76     Unit Number [id number]     Unit ABO O     Unit RH NEG     XM INTEP COMP     Dispense Status TR     Blood Expiration Date 5/24/2025 11:59:00 PM EDT     PRODUCT BLOOD TYPE 9500     UNIT VOLUME 350     PRODUCT CODE Z5318N05     Unit Number [id number]     Unit ABO O     Unit RH NEG     XM INTEP COMP     Dispense Status TR     Blood Expiration Date 5/26/2025 11:59:00 PM EDT     PRODUCT BLOOD TYPE 9500     UNIT VOLUME 350     PRODUCT CODE K0785D44     Unit Number [id number]     Unit ABO O     Unit RH NEG     XM INTEP COMP     Dispense Status TR     Blood Expiration Date 5/26/2025 11:59:00 PM EDT     PRODUCT BLOOD TYPE 9500     UNIT VOLUME 350     PRODUCT CODE X8628N10     Unit Number [id number]     Unit ABO O     Unit RH NEG     XM INTEP COMP     Dispense Status TR     Blood Expiration Date 5/31/2025 11:59:00 PM EDT     PRODUCT BLOOD TYPE 9500     UNIT VOLUME 282     PRODUCT  CODE Y8635K28     Unit Number [id number]     Unit ABO O     Unit RH NEG     XM INTEP COMP     Dispense Status TR     Blood Expiration Date 6/2/2025 11:59:00 PM EDT     PRODUCT BLOOD TYPE 9500     UNIT VOLUME 284    CBC   Result Value Ref Range    WBC 6.9 4.4 - 11.3 x10*3/uL    nRBC 0.3 (H) 0.0 - 0.0 /100 WBCs    RBC 3.45 (L) 4.00 - 5.20 x10*6/uL    Hemoglobin 9.2 (L) 12.0 - 16.0 g/dL    Hematocrit 29.9 (L) 36.0 - 46.0 %    MCV 87 80 - 100 fL    MCH 26.7 26.0 - 34.0 pg    MCHC 30.8 (L) 32.0 - 36.0 g/dL    RDW 21.6 (H) 11.5 - 14.5 %    Platelets 153 150 - 450 x10*3/uL   Calcium, Ionized   Result Value Ref Range    POCT Calcium, Ionized 0.99 (L) 1.1 - 1.33 mmol/L   Renal function panel   Result Value Ref Range    Glucose 81 74 - 99 mg/dL    Sodium 135 (L) 136 - 145 mmol/L    Potassium 5.1 3.5 - 5.3 mmol/L    Chloride 100 98 - 107 mmol/L    Bicarbonate 30 21 - 32 mmol/L    Anion Gap 10 10 - 20 mmol/L    Urea Nitrogen 30 (H) 6 - 23 mg/dL    Creatinine 2.90 (H) 0.50 - 1.05 mg/dL    eGFR 18 (L) >60 mL/min/1.73m*2    Calcium 7.8 (L) 8.6 - 10.6 mg/dL    Phosphorus 5.8 (H) 2.5 - 4.9 mg/dL    Albumin 3.0 (L) 3.4 - 5.0 g/dL   Blood Gas Venous Full Panel   Result Value Ref Range    POCT pH, Venous 7.18 (LL) 7.33 - 7.43 pH    POCT pCO2, Venous 83 (HH) 41 - 51 mm Hg    POCT pO2, Venous 32 (L) 35 - 45 mm Hg    POCT SO2, Venous 48 45 - 75 %    POCT Oxy Hemoglobin, Venous 45.9 45.0 - 75.0 %    POCT Hematocrit Calculated, Venous 29.0 (L) 36.0 - 46.0 %    POCT Sodium, Venous 133 (L) 136 - 145 mmol/L    POCT Potassium, Venous 4.7 3.5 - 5.3 mmol/L    POCT Chloride, Venous 100 98 - 107 mmol/L    POCT Ionized Calicum, Venous 1.13 1.10 - 1.33 mmol/L    POCT Glucose, Venous 74 74 - 99 mg/dL    POCT Lactate, Venous 0.5 0.4 - 2.0 mmol/L    POCT Base Excess, Venous 1.2 -2.0 - 3.0 mmol/L    POCT HCO3 Calculated, Venous 31.0 (H) 22.0 - 26.0 mmol/L    POCT Hemoglobin, Venous 9.8 (L) 12.0 - 16.0 g/dL    POCT Anion Gap, Venous 7.0 (L) 10.0 -  25.0 mmol/L    Patient Temperature 37.0 degrees Celsius    FiO2 40 %   Heparin Assay, UFH   Result Value Ref Range    Heparin Unfractionated 0.6 See Comment Below for Therapeutic Ranges IU/mL   Blood Gas Venous   Result Value Ref Range    POCT pH, Venous 7.23 (LL) 7.33 - 7.43 pH    POCT pCO2, Venous 73 (HH) 41 - 51 mm Hg    POCT pO2, Venous 45 35 - 45 mm Hg    POCT SO2, Venous 75 45 - 75 %    POCT Oxy Hemoglobin, Venous 71.4 45.0 - 75.0 %    POCT Base Excess, Venous 1.0 -2.0 - 3.0 mmol/L    POCT HCO3 Calculated, Venous 30.6 (H) 22.0 - 26.0 mmol/L    Patient Temperature 37.0 degrees Celsius    FiO2 40 %   Urine electrolytes   Result Value Ref Range    Sodium, Urine Random 19 mmol/L    Sodium/Creatinine Ratio 10 Not established. mmol/g Creat    Potassium, Urine Random 27 mmol/L    Potassium/Creatinine Ratio 14 Not established mmol/g Creat    Chloride, Urine Random <15 mmol/L    Chloride/Creatinine Ratio      Creatinine, Urine Random 193.6 20.0 - 320.0 mg/dL   Albumin-Creatinine Ratio, Urine Random   Result Value Ref Range    Albumin, Urine Random 17.6 Not established mg/L    Creatinine, Urine Random 193.6 20.0 - 320.0 mg/dL    Albumin/Creatinine Ratio 9.1 <30.0 ug/mg Creat   Protein, Urine Random   Result Value Ref Range    Total Protein, Urine Random 29 (H) 5 - 24 mg/dL    Creatinine, Urine Random 193.6 20.0 - 320.0 mg/dL    T. Protein/Creatinine Ratio 0.15 0.00 - 0.17 mg/mg Creat   Urinalysis with Reflex Culture and Microscopic   Result Value Ref Range    Color, Urine Yellow Light-Yellow, Yellow, Dark-Yellow    Appearance, Urine Clear Clear    Specific Gravity, Urine 1.015 1.005 - 1.035    pH, Urine 5.5 5.0, 5.5, 6.0, 6.5, 7.0, 7.5, 8.0    Protein, Urine 20 (TRACE) NEGATIVE, 10 (TRACE), 20 (TRACE) mg/dL    Glucose, Urine Normal Normal mg/dL    Blood, Urine 1.0 (3+) (A) NEGATIVE mg/dL    Ketones, Urine NEGATIVE NEGATIVE mg/dL    Bilirubin, Urine NEGATIVE NEGATIVE mg/dL    Urobilinogen, Urine Normal Normal mg/dL     Nitrite, Urine NEGATIVE NEGATIVE    Leukocyte Esterase, Urine NEGATIVE NEGATIVE   Extra Urine Gray Tube   Result Value Ref Range    Extra Tube 293    Urinalysis Microscopic   Result Value Ref Range    WBC, Urine 1-5 1-5, NONE /HPF    RBC, Urine NONE NONE, 1-2, 3-5 /HPF    Squamous Epithelial Cells, Urine 1-9 (SPARSE) Reference range not established. /HPF    Bacteria, Urine 1+ (A) NONE SEEN /HPF   Magnesium   Result Value Ref Range    Magnesium 2.33 1.60 - 2.40 mg/dL   CBC and Auto Differential   Result Value Ref Range    WBC 11.0 4.4 - 11.3 x10*3/uL    nRBC 10.1 (H) 0.0 - 0.0 /100 WBCs    RBC 3.11 (L) 4.00 - 5.20 x10*6/uL    Hemoglobin 8.6 (L) 12.0 - 16.0 g/dL    Hematocrit 25.0 (L) 36.0 - 46.0 %    MCV 80 80 - 100 fL    MCH 27.7 26.0 - 34.0 pg    MCHC 34.4 32.0 - 36.0 g/dL    RDW 21.2 (H) 11.5 - 14.5 %    Platelets 156 150 - 450 x10*3/uL    Neutrophils % 80.1 40.0 - 80.0 %    Immature Granulocytes %, Automated 0.4 0.0 - 0.9 %    Lymphocytes % 10.9 13.0 - 44.0 %    Monocytes % 6.0 2.0 - 10.0 %    Eosinophils % 2.4 0.0 - 6.0 %    Basophils % 0.2 0.0 - 2.0 %    Neutrophils Absolute 8.79 (H) 1.20 - 7.70 x10*3/uL    Immature Granulocytes Absolute, Automated 0.04 0.00 - 0.70 x10*3/uL    Lymphocytes Absolute 1.20 1.20 - 4.80 x10*3/uL    Monocytes Absolute 0.66 0.10 - 1.00 x10*3/uL    Eosinophils Absolute 0.26 0.00 - 0.70 x10*3/uL    Basophils Absolute 0.02 0.00 - 0.10 x10*3/uL   Comprehensive Metabolic Panel   Result Value Ref Range    Glucose 135 (H) 74 - 99 mg/dL    Sodium 135 (L) 136 - 145 mmol/L    Potassium 4.5 3.5 - 5.3 mmol/L    Chloride 101 98 - 107 mmol/L    Bicarbonate 27 21 - 32 mmol/L    Anion Gap 12 10 - 20 mmol/L    Urea Nitrogen 29 (H) 6 - 23 mg/dL    Creatinine 2.42 (H) 0.50 - 1.05 mg/dL    eGFR 23 (L) >60 mL/min/1.73m*2    Calcium 7.7 (L) 8.6 - 10.6 mg/dL    Albumin 2.8 (L) 3.4 - 5.0 g/dL    Alkaline Phosphatase 94 33 - 110 U/L    Total Protein 5.7 (L) 6.4 - 8.2 g/dL    AST 19 9 - 39 U/L    Bilirubin,  Total 2.1 (H) 0.0 - 1.2 mg/dL    ALT 9 7 - 45 U/L   Lactate Dehydrogenase   Result Value Ref Range     (H) 84 - 246 U/L   Reticulocytes   Result Value Ref Range    Retic % 4.0 (H) 0.5 - 2.0 %    Retic Absolute 0.126 (H) 0.018 - 0.083 x10*6/uL    Reticulocyte Hemoglobin 21 (L) 28 - 38 pg    Immature Retic fraction 18.1 (H) <=16.0 %   Phosphorus   Result Value Ref Range    Phosphorus 4.3 2.5 - 4.9 mg/dL   Morphology   Result Value Ref Range    RBC Morphology See Below     Hypochromia Mild     Target Cells Few    Heparin Assay, UFH   Result Value Ref Range    Heparin Unfractionated 0.9 See Comment Below for Therapeutic Ranges IU/mL   Heparin Assay, UFH   Result Value Ref Range    Heparin Unfractionated 0.9 See Comment Below for Therapeutic Ranges IU/mL   Heparin Assay, UFH   Result Value Ref Range    Heparin Unfractionated 0.8 See Comment Below for Therapeutic Ranges IU/mL   Blood Gas Venous Full Panel   Result Value Ref Range    POCT pH, Venous 7.24 (LL) 7.33 - 7.43 pH    POCT pCO2, Venous 74 (HH) 41 - 51 mm Hg    POCT pO2, Venous 47 (H) 35 - 45 mm Hg    POCT SO2, Venous 79 (H) 45 - 75 %    POCT Oxy Hemoglobin, Venous 75.1 (H) 45.0 - 75.0 %    POCT Hematocrit Calculated, Venous 29.0 (L) 36.0 - 46.0 %    POCT Sodium, Venous 134 (L) 136 - 145 mmol/L    POCT Potassium, Venous 4.8 3.5 - 5.3 mmol/L    POCT Chloride, Venous 101 98 - 107 mmol/L    POCT Ionized Calicum, Venous 1.20 1.10 - 1.33 mmol/L    POCT Glucose, Venous 89 74 - 99 mg/dL    POCT Lactate, Venous 0.6 0.4 - 2.0 mmol/L    POCT Base Excess, Venous 3.0 -2.0 - 3.0 mmol/L    POCT HCO3 Calculated, Venous 31.7 (H) 22.0 - 26.0 mmol/L    POCT Hemoglobin, Venous 9.7 (L) 12.0 - 16.0 g/dL    POCT Anion Gap, Venous 6.0 (L) 10.0 - 25.0 mmol/L    Patient Temperature 37.0 degrees Celsius    FiO2 0 %     *Note: Due to a large number of results and/or encounters for the requested time period, some results have not been displayed. A complete set of results can be found  in Results Review.          ASSESSMENT:  Senait Narvaez is a  55 y.o.  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     Patient does not have underlying CKD, but has had multiple significant CHARLES during prior hospitalization (Cr peak 5.4 iso septic shock in 9/2024).  Nephrology engaged during that time, patient did not require dialysis and was supported with exchange transfusions for underlying sickle cell.  Patient had repeat CHARLES after hospitalization in December, but had returned to baseline afterwards.  Baseline creatinine at 1, EGFR greater than 60.  When admitted on 5/7 creatinine was 2.7 with uptrend to 3.9.  Given patient's history of good renal recovery, hopeful that she will recover from this CHARLES as she is being treated for acute chest.    Patient is now s/p RBC exchange transfusion.  With improvements in serum creatinine.            #CHARLES on no CKD, oliguric, Stage III  - Baseline creatinine: 1.0   - Etiology: Sepsis, hypovolemia  - UA showed: See below  - Clinical volume status: Euvolemic  - Electrolytes (Na, K, Ca, Phos) now stable, prior hyper K requiring    - Acid base status: respiratory acidosis    Work Up:  FENA (5/9): 0.2% (unclear last jim patient was diuresed, possibly affect FENa calculation)  Alb/Cr: WNL  UA: Trace protein, +3 blood, no ketones, negative nitrates/LE  TPCR: WNL  Total protein, urine random: Slightly elevated at 29        Recommendations:  - Indication for dialysis:  No, Cr starting to downtrend   - Agree with Renal US (ordered) to rule out obstruction  -Please ensure patient is set up with outpatient nephrology given long-term risk of CKD  from the multiple stage III CHARLES's  - Appreciate excellent care by MICU team  - Avoid nephrotoxins, contrast if possible  - strict Is/Os  - Renal dosing for medications for latest eGFR, follow medication trough as appropriate  - Avoid hypotension/rapid fluctuations in BPs       Nephrology will sign off now, please reach out with any questions.    Celso Rader DO  Nephrology Resident  24 hour Renal Pager - [phone]

## 2025-05-10 LAB
ALBUMIN SERPL BCP-MCNC: 3.1 G/DL (ref 3.4–5)
ALBUMIN SERPL BCP-MCNC: 3.2 G/DL (ref 3.4–5)
ALP SERPL-CCNC: 100 U/L (ref 33–110)
ALP SERPL-CCNC: 101 U/L (ref 33–110)
ALT SERPL W P-5'-P-CCNC: 8 U/L (ref 7–45)
ALT SERPL W P-5'-P-CCNC: 9 U/L (ref 7–45)
ANION GAP SERPL CALC-SCNC: 10 MMOL/L
ANION GAP SERPL CALC-SCNC: 8 MMOL/L (ref 10–20)
AST SERPL W P-5'-P-CCNC: 10 U/L (ref 9–39)
AST SERPL W P-5'-P-CCNC: 12 U/L (ref 9–39)
BASE EXCESS BLDV CALC-SCNC: 3.4 MMOL/L (ref -2–3)
BASOPHILS # BLD MANUAL: 0 X10*3/UL (ref 0–0.1)
BASOPHILS NFR BLD MANUAL: 0 %
BILIRUB SERPL-MCNC: 1 MG/DL (ref 0–1.2)
BILIRUB SERPL-MCNC: 1 MG/DL (ref 0–1.2)
BODY TEMPERATURE: 37 DEGREES CELSIUS
BUN SERPL-MCNC: 24 MG/DL (ref 6–23)
BUN SERPL-MCNC: 25 MG/DL (ref 6–23)
CALCIUM SERPL-MCNC: 8.3 MG/DL (ref 8.6–10.6)
CALCIUM SERPL-MCNC: 8.4 MG/DL (ref 8.6–10.6)
CHLORIDE SERPL-SCNC: 100 MMOL/L (ref 98–107)
CHLORIDE SERPL-SCNC: 99 MMOL/L (ref 98–107)
CO2 SERPL-SCNC: 31 MMOL/L (ref 21–32)
CO2 SERPL-SCNC: 34 MMOL/L (ref 21–32)
CREAT SERPL-MCNC: 1.56 MG/DL (ref 0.5–1.05)
CREAT SERPL-MCNC: 1.63 MG/DL (ref 0.5–1.05)
EGFRCR SERPLBLD CKD-EPI 2021: 37 ML/MIN/1.73M*2
EGFRCR SERPLBLD CKD-EPI 2021: 39 ML/MIN/1.73M*2
EOSINOPHIL # BLD MANUAL: 0.37 X10*3/UL (ref 0–0.7)
EOSINOPHIL NFR BLD MANUAL: 3.4 %
ERYTHROCYTE [DISTWIDTH] IN BLOOD BY AUTOMATED COUNT: 22.9 % (ref 11.5–14.5)
GLUCOSE SERPL-MCNC: 82 MG/DL (ref 74–99)
GLUCOSE SERPL-MCNC: 86 MG/DL (ref 74–99)
HCO3 BLDV-SCNC: 32.8 MMOL/L (ref 22–26)
HCT VFR BLD AUTO: 24.7 % (ref 36–46)
HGB BLD-MCNC: 8 G/DL (ref 12–16)
HGB RETIC QN: 24 PG (ref 28–38)
HGB RETIC QN: 24 PG (ref 28–38)
HYPOCHROMIA BLD QL SMEAR: NORMAL
IMM GRANULOCYTES # BLD AUTO: 0.04 X10*3/UL (ref 0–0.7)
IMM GRANULOCYTES NFR BLD AUTO: 0.4 % (ref 0–0.9)
IMMATURE RETIC FRACTION: 30.1 %
IMMATURE RETIC FRACTION: 30.7 %
INHALED O2 CONCENTRATION: 36 %
INR PPP: 1.4 (ref 0.9–1.1)
LDH SERPL L TO P-CCNC: 164 U/L (ref 84–246)
LDH SERPL L TO P-CCNC: 175 U/L (ref 84–246)
LYMPHOCYTES # BLD MANUAL: 3.23 X10*3/UL (ref 1.2–4.8)
LYMPHOCYTES NFR BLD MANUAL: 29.9 %
MAGNESIUM SERPL-MCNC: 2.37 MG/DL (ref 1.6–2.4)
MCH RBC QN AUTO: 27 PG (ref 26–34)
MCHC RBC AUTO-ENTMCNC: 32.4 G/DL (ref 32–36)
MCV RBC AUTO: 83 FL (ref 80–100)
MONOCYTES # BLD MANUAL: 0.93 X10*3/UL (ref 0.1–1)
MONOCYTES NFR BLD MANUAL: 8.6 %
NEUTS SEG # BLD MANUAL: 6.27 X10*3/UL (ref 1.2–7)
NEUTS SEG NFR BLD MANUAL: 58.1 %
NRBC BLD-RTO: 20.4 /100 WBCS (ref 0–0)
OXYHGB MFR BLDV: 71.2 % (ref 45–75)
PCO2 BLDV: 73 MM HG (ref 41–51)
PH BLDV: 7.26 PH (ref 7.33–7.43)
PHOSPHATE SERPL-MCNC: 3.6 MG/DL (ref 2.5–4.9)
PLATELET # BLD AUTO: 212 X10*3/UL (ref 150–450)
PO2 BLDV: 45 MM HG (ref 35–45)
POTASSIUM SERPL-SCNC: 4.3 MMOL/L (ref 3.5–5.3)
POTASSIUM SERPL-SCNC: 4.4 MMOL/L (ref 3.5–5.3)
PROT SERPL-MCNC: 6 G/DL (ref 6.4–8.2)
PROT SERPL-MCNC: 6.2 G/DL (ref 6.4–8.2)
PROTHROMBIN TIME: 15 SECONDS (ref 9.8–12.4)
RBC # BLD AUTO: 2.96 X10*6/UL (ref 4–5.2)
RBC MORPH BLD: NORMAL
RETICS #: 0.15 X10*6/UL (ref 0.02–0.08)
RETICS #: 0.16 X10*6/UL (ref 0.02–0.08)
RETICS/RBC NFR AUTO: 5.2 % (ref 0.5–2)
RETICS/RBC NFR AUTO: 5.5 % (ref 0.5–2)
SAO2 % BLDV: 73 % (ref 45–75)
SCHISTOCYTES BLD QL SMEAR: NORMAL
SODIUM SERPL-SCNC: 136 MMOL/L (ref 136–145)
SODIUM SERPL-SCNC: 138 MMOL/L (ref 136–145)
TARGETS BLD QL SMEAR: NORMAL
TOTAL CELLS COUNTED BLD: 117
UFH PPP CHRO-ACNC: 0.4 IU/ML (ref ?–1.1)
WBC # BLD AUTO: 10.8 X10*3/UL (ref 4.4–11.3)

## 2025-05-10 PROCEDURE — 2500000004 HC RX 250 GENERAL PHARMACY W/ HCPCS (ALT 636 FOR OP/ED): Mod: JZ,TB

## 2025-05-10 PROCEDURE — 85045 AUTOMATED RETICULOCYTE COUNT: CPT

## 2025-05-10 PROCEDURE — 85610 PROTHROMBIN TIME: CPT

## 2025-05-10 PROCEDURE — 85027 COMPLETE CBC AUTOMATED: CPT

## 2025-05-10 PROCEDURE — 2500000002 HC RX 250 W HCPCS SELF ADMINISTERED DRUGS (ALT 637 FOR MEDICARE OP, ALT 636 FOR OP/ED)

## 2025-05-10 PROCEDURE — 84075 ASSAY ALKALINE PHOSPHATASE: CPT

## 2025-05-10 PROCEDURE — 85520 HEPARIN ASSAY: CPT

## 2025-05-10 PROCEDURE — 85007 BL SMEAR W/DIFF WBC COUNT: CPT

## 2025-05-10 PROCEDURE — 2500000001 HC RX 250 WO HCPCS SELF ADMINISTERED DRUGS (ALT 637 FOR MEDICARE OP)

## 2025-05-10 PROCEDURE — 99233 SBSQ HOSP IP/OBS HIGH 50: CPT | Performed by: INTERNAL MEDICINE

## 2025-05-10 PROCEDURE — 83735 ASSAY OF MAGNESIUM: CPT

## 2025-05-10 PROCEDURE — 83615 LACTATE (LD) (LDH) ENZYME: CPT

## 2025-05-10 PROCEDURE — 36415 COLL VENOUS BLD VENIPUNCTURE: CPT

## 2025-05-10 PROCEDURE — 80053 COMPREHEN METABOLIC PANEL: CPT

## 2025-05-10 PROCEDURE — 1200000003 HC ONCOLOGY  ROOM WITH TELEMETRY DAILY

## 2025-05-10 PROCEDURE — 84100 ASSAY OF PHOSPHORUS: CPT

## 2025-05-10 PROCEDURE — 2500000005 HC RX 250 GENERAL PHARMACY W/O HCPCS

## 2025-05-10 PROCEDURE — 82805 BLOOD GASES W/O2 SATURATION: CPT

## 2025-05-10 RX ADMIN — DIPHENHYDRAMINE HYDROCHLORIDE 25 MG: 25 CAPSULE ORAL at 20:25

## 2025-05-10 RX ADMIN — OXYCODONE HYDROCHLORIDE AND ACETAMINOPHEN 500 MG: 500 TABLET ORAL at 09:00

## 2025-05-10 RX ADMIN — HYDROMORPHONE HYDROCHLORIDE 1 MG: 1 INJECTION, SOLUTION INTRAMUSCULAR; INTRAVENOUS; SUBCUTANEOUS at 23:25

## 2025-05-10 RX ADMIN — CYCLOBENZAPRINE 10 MG: 10 TABLET, FILM COATED ORAL at 20:25

## 2025-05-10 RX ADMIN — OXYCODONE 10 MG: 5 TABLET ORAL at 20:25

## 2025-05-10 RX ADMIN — SODIUM ZIRCONIUM CYCLOSILICATE 10 G: 10 POWDER, FOR SUSPENSION ORAL at 11:18

## 2025-05-10 RX ADMIN — FUROSEMIDE 20 MG: 20 TABLET ORAL at 09:00

## 2025-05-10 RX ADMIN — Medication 4 L/MIN: at 20:26

## 2025-05-10 RX ADMIN — WARFARIN SODIUM 5 MG: 5 TABLET ORAL at 18:27

## 2025-05-10 RX ADMIN — ATROPINE SULFATE 1 DROP: 10 SOLUTION/ DROPS OPHTHALMIC at 20:25

## 2025-05-10 RX ADMIN — DIPHENHYDRAMINE HYDROCHLORIDE 25 MG: 25 CAPSULE ORAL at 05:10

## 2025-05-10 RX ADMIN — CETIRIZINE HYDROCHLORIDE 10 MG: 10 TABLET, FILM COATED ORAL at 09:00

## 2025-05-10 RX ADMIN — OXYCODONE 10 MG: 5 TABLET ORAL at 05:10

## 2025-05-10 RX ADMIN — SENNOSIDES AND DOCUSATE SODIUM 2 TABLET: 50; 8.6 TABLET ORAL at 09:00

## 2025-05-10 RX ADMIN — HYDROMORPHONE HYDROCHLORIDE 1 MG: 1 INJECTION, SOLUTION INTRAMUSCULAR; INTRAVENOUS; SUBCUTANEOUS at 16:35

## 2025-05-10 RX ADMIN — OXYCODONE 10 MG: 5 TABLET ORAL at 13:11

## 2025-05-10 RX ADMIN — Medication 4 L/MIN: at 09:05

## 2025-05-10 RX ADMIN — FOLIC ACID 1 MG: 1 TABLET ORAL at 09:00

## 2025-05-10 RX ADMIN — HYDROMORPHONE HYDROCHLORIDE 1 MG: 1 INJECTION, SOLUTION INTRAMUSCULAR; INTRAVENOUS; SUBCUTANEOUS at 08:59

## 2025-05-10 RX ADMIN — AZITHROMYCIN DIHYDRATE 500 MG: 500 TABLET ORAL at 15:05

## 2025-05-10 RX ADMIN — METOPROLOL TARTRATE 25 MG: 25 TABLET, FILM COATED ORAL at 20:25

## 2025-05-10 ASSESSMENT — PAIN DESCRIPTION - LOCATION
LOCATION: OTHER (COMMENT)

## 2025-05-10 ASSESSMENT — COGNITIVE AND FUNCTIONAL STATUS - GENERAL
MOBILITY SCORE: 23
DAILY ACTIVITIY SCORE: 24
CLIMB 3 TO 5 STEPS WITH RAILING: A LITTLE

## 2025-05-10 ASSESSMENT — PAIN - FUNCTIONAL ASSESSMENT
PAIN_FUNCTIONAL_ASSESSMENT: 0-10

## 2025-05-10 ASSESSMENT — PAIN SCALES - GENERAL
PAINLEVEL_OUTOF10: 5 - MODERATE PAIN
PAINLEVEL_OUTOF10: 8
PAINLEVEL_OUTOF10: 4
PAINLEVEL_OUTOF10: 7
PAINLEVEL_OUTOF10: 6
PAINLEVEL_OUTOF10: 9
PAINLEVEL_OUTOF10: 7

## 2025-05-10 ASSESSMENT — ACTIVITIES OF DAILY LIVING (ADL): LACK_OF_TRANSPORTATION: NO

## 2025-05-10 NOTE — PROGRESS NOTES
"Senait Narvaez is a 55 y.o. female on day 3 of admission presenting with Pneumonia due to infectious organism, unspecified laterality, unspecified part of lung.    Subjective   Patient seen at bedside. States that she is feeling well overall. States that her pain has somewhat improved overall. Denies any SOB, CP, heart palpitations, fever/chills. Discussed improved kidney function and overall labs.        Objective     Physical Exam  Constitutional:       Appearance: She is obese.   HENT:      Head: Normocephalic.      Nose: Nose normal.      Mouth/Throat:      Mouth: Mucous membranes are moist.   Eyes:      Pupils: Pupils are equal, round, and reactive to light.   Cardiovascular:      Rate and Rhythm: Normal rate.      Pulses: Normal pulses.   Pulmonary:      Effort: Pulmonary effort is normal.   Abdominal:      Palpations: Abdomen is soft.   Musculoskeletal:         General: Normal range of motion.   Skin:     General: Skin is warm.      Capillary Refill: Capillary refill takes less than 2 seconds.   Neurological:      General: No focal deficit present.      Mental Status: She is alert.   Psychiatric:         Mood and Affect: Mood normal.         Behavior: Behavior normal.         Last Recorded Vitals  Blood pressure 130/76, pulse 69, temperature 36.3 °C (97.3 °F), temperature source Temporal, resp. rate 18, height 1.651 m (5' 5\"), weight 109 kg (239 lb 13.8 oz), SpO2 99%.  Intake/Output last 3 Shifts:  I/O last 3 completed shifts:  In: 371.1 (3.4 mL/kg) [I.V.:271.1 (2.5 mL/kg); IV Piggyback:100]  Out: 1000 (9.2 mL/kg) [Urine:1000 (0.3 mL/kg/hr)]  Weight: 108.8 kg     Relevant Results             Assessment & Plan  Pneumonia due to infectious organism, unspecified laterality, unspecified part of lung    Senait Narvaez is a 55 y.o. 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 On arrival found to be hypoxic to 82% on RA, placed on 2L O2 via NC. Patient admitted for pain control. CXR on admit showing consolidation vs atelectasis. Started on Azithromycin and Ceftriaxone (5/7-current). Later in the night patient became hypotensive and was transferred to MICU. Patient also having CHARLES on CKD at the time. Patient received exchange transfusion 5/8. Nephrology consult-appreciate recs, On 5/9 hypotension improved and patients mental status improved so she transferred to [place] [id number] and was transitioned to [facility]. Discharge pending improvement in CHARLES and pain      # Hb SC disease   # Acute on chronic SCD related pain crisis  - currently on q6 week RBC exchange transfusions (most recent outpatient 4/18 and inpatient 5/8)  - [facility] reviewed, last updated 4/16/25  - OARRS reviewed, no aberrant behavior noted   - Apheresis line in place from exchange   - Continue oxycodone 5mg PRN for moderate pain, and 10mg PRN for severe pain.  dilaudid 1 mg IV q4h PRN for breakthrough pain  - Cyclobenzaprine 10 mg every 8 hours prn   - Bowel regimen for opioid induced constipation with Senna 2tabs BID and Miralax daily, PO Benadryl 25 mg q6h PRN for opioid-induced pruritus, PO Zofran 8 mg q8h PRN for opioid-induced nausea  - continue 2L night time oxygen   - c/w home folic acid 1 mg daily  - Utox: + cannabinoid (3/4)   - IS encouraged      #AHRF   #acute hypercapnic respiratory failure  #cf acute chest syndrome  #cf CAP  #pulmonary HTN  :: VBG showed PH:7.15->7.20->7.23 , Co2 of 86->75->71 with bicarb of 28, respiratory acidosis   ::possibly iso opioid use   -CPAP ordered HS  -CAP treatment as above     #HFpEF  #pHTN  #troponinemia   ::Last TTE today: HFpEF with EF of 67% right " ventricular overloaded , severely enlarged RV   ::troponin leak likely iso demand  - on lasix 20 every other day at home  - trop 320 ->310      #hx of SVT  -hold metoprolol while hypotensive  -tele     #CHARLES-improving   #hyperkalemia  ::Baseline Cr ~1.0, 2.75>3.76->386 today   ::Some concern for kidney involvement of sickle cell crisis  -lokelma 10g q8  - urine electrolytes  - avoid nephrotoxins  - sickle cell crisis management as below  - nephrology consulted recs appreciated     #hx of DVT/PE  #hx of SVC syndrome  ::On warfarin at home, INR 1.5 on admission  - heparin gtt  - Warfarin restarted 5/9 will need to bridge with heparin gtt      Dispo   - Discharge after warfarin bridge and improvement in CHARLES and pain   - FUV ophtho 5/21, requested closer FUV with Dr. Whaley, cards 8/25  - Emergency contact Salgado Tati Narvaez [phone]      I spent 60 minutes in the professional and overall care of this patient.      Rach Saleh, APRN-CNP  Patient discussed with Dr. Cr

## 2025-05-10 NOTE — CARE PLAN
Problem: Pain - Adult  Goal: Verbalizes/displays adequate comfort level or baseline comfort level  Outcome: Progressing     Problem: Safety - Adult  Goal: Free from fall injury  Outcome: Progressing     Problem: Discharge Planning  Goal: Discharge to home or other facility with appropriate resources  Outcome: Progressing     Problem: Chronic Conditions and Co-morbidities  Goal: Patient's chronic conditions and co-morbidity symptoms are monitored and maintained or improved  Outcome: Progressing     Problem: Nutrition  Goal: Nutrient intake appropriate for maintaining nutritional needs  Outcome: Progressing     Problem: Fall/Injury  Goal: Not fall by end of shift  Outcome: Progressing  Goal: Be free from injury by end of the shift  Outcome: Progressing  Goal: Verbalize understanding of personal risk factors for fall in the hospital  Outcome: Progressing  Goal: Verbalize understanding of risk factor reduction measures to prevent injury from fall in the home  Outcome: Progressing  Goal: Use assistive devices by end of the shift  Outcome: Progressing  Goal: Pace activities to prevent fatigue by end of the shift  Outcome: Progressing     Problem: Pain  Goal: Takes deep breaths with improved pain control throughout the shift  Outcome: Progressing  Goal: Turns in bed with improved pain control throughout the shift  Outcome: Progressing  Goal: Walks with improved pain control throughout the shift  Outcome: Progressing  Goal: Performs ADL's with improved pain control throughout shift  Outcome: Progressing  Goal: Participates in PT with improved pain control throughout the shift  Outcome: Progressing  Goal: Free from opioid side effects throughout the shift  Outcome: Progressing  Goal: Free from acute confusion related to pain meds throughout the shift  Outcome: Progressing     Problem: Skin  Goal: Participates in plan/prevention/treatment measures  Outcome: Progressing  Goal: Prevent/manage excess moisture  Outcome:  Progressing  Goal: Prevent/minimize sheer/friction injuries  Outcome: Progressing  Goal: Promote/optimize nutrition  Outcome: Progressing  Goal: Promote skin healing  Outcome: Progressing   The patient's goals for the shift include  rest and comfort    The clinical goals for the shift include Pain will be managed, safety maintained this shift [id number].    Over the shift, the patient did not make progress toward the following goals. Barriers to progression include safety. Recommendations to address these barriers include reorient to room.

## 2025-05-10 NOTE — PROGRESS NOTES
05/10/25 0800   Discharge Planning   Living Arrangements Alone   Support Systems Children;Parent   Assistance Needed none   Type of Residence Private residence   Number of Stairs to Enter Residence 0   Number of Stairs Within Residence 0   Do you have animals or pets at home? No   Who is requesting discharge planning? Patient   Home or Post Acute Services None   Expected Discharge Disposition Home   Does the patient need discharge transport arranged? No   Financial Resource Strain   How hard is it for you to pay for the very basics like food, housing, medical care, and heating? Not hard   Housing Stability   In the last 12 months, was there a time when you were not able to pay the mortgage or rent on time? N   In the past 12 months, how many times have you moved where you were living? 0   At any time in the past 12 months, were you homeless or living in a shelter (including now)? N   Transportation Needs   In the past 12 months, has lack of transportation kept you from medical appointments or from getting medications? no   In the past 12 months, has lack of transportation kept you from meetings, work, or from getting things needed for daily living? No   Patient Choice   Provider Choice list and CMS website (https://medicare.gov/care-compare#search) for post-acute Quality and Resource Measure Data were provided and reviewed with: Patient   Patient / Family choosing to utilize agency / facility established prior to hospitalization No   Stroke Family Assessment   Stroke Family Assessment Needed No   Intensity of Service   Intensity of Service 0-30 min     Care Transitions Note    Plan per Medical/Surgical Team: pneumonia due to infectious organism  Status: inpatient   Payor Source: united healthcare dual  Discharge disposition: home   Expected date of discharge: TBD  Barriers: none  PCP / Primary Oncologist: [facility]  Preferred Pharmacy:   [id number] - [place], [place] - [address] [place] [address] AT  [facility] [address] [place]   [address] [place] [address] [place] [id number]   Preferred home care agency: none    TCC met with patient at bedside to discuss anticipated discharge needs. Demographics and insurance verifed with patient. Patient lives at home with support from daughter. Patient is independent with ADLs prior to admission. Is not active with home care or DME supplier. Has a rollator at home. Will continue to follow for any discharge planning needs.   Nery Garcia RN TCC

## 2025-05-10 NOTE — CARE PLAN
The patient's goals for the shift include      The clinical goals for the shift include pt will not fall throughout shift      Problem: Pain - Adult  Goal: Verbalizes/displays adequate comfort level or baseline comfort level  Outcome: Progressing     Problem: Safety - Adult  Goal: Free from fall injury  Outcome: Progressing     Problem: Discharge Planning  Goal: Discharge to home or other facility with appropriate resources  Outcome: Progressing     Problem: Chronic Conditions and Co-morbidities  Goal: Patient's chronic conditions and co-morbidity symptoms are monitored and maintained or improved  Outcome: Progressing     Problem: Nutrition  Goal: Nutrient intake appropriate for maintaining nutritional needs  Outcome: Progressing

## 2025-05-11 VITALS
WEIGHT: 235.01 LBS | RESPIRATION RATE: 16 BRPM | HEIGHT: 65 IN | DIASTOLIC BLOOD PRESSURE: 67 MMHG | OXYGEN SATURATION: 100 % | BODY MASS INDEX: 39.16 KG/M2 | SYSTOLIC BLOOD PRESSURE: 102 MMHG | HEART RATE: 60 BPM | TEMPERATURE: 97.3 F

## 2025-05-11 LAB
ALBUMIN SERPL BCP-MCNC: 3.2 G/DL (ref 3.4–5)
ALP SERPL-CCNC: 101 U/L (ref 33–110)
ALT SERPL W P-5'-P-CCNC: 8 U/L (ref 7–45)
ANION GAP SERPL CALC-SCNC: 10 MMOL/L (ref 10–20)
AST SERPL W P-5'-P-CCNC: 10 U/L (ref 9–39)
BACTERIA BLD CULT: NORMAL
BACTERIA BLD CULT: NORMAL
BASOPHILS # BLD AUTO: 0.02 X10*3/UL (ref 0–0.1)
BASOPHILS NFR BLD AUTO: 0.2 %
BILIRUB SERPL-MCNC: 0.9 MG/DL (ref 0–1.2)
BUN SERPL-MCNC: 21 MG/DL (ref 6–23)
CALCIUM SERPL-MCNC: 8.4 MG/DL (ref 8.6–10.6)
CHLORIDE SERPL-SCNC: 98 MMOL/L (ref 98–107)
CO2 SERPL-SCNC: 34 MMOL/L (ref 21–32)
CREAT SERPL-MCNC: 1.32 MG/DL (ref 0.5–1.05)
EGFRCR SERPLBLD CKD-EPI 2021: 47 ML/MIN/1.73M*2
EOSINOPHIL # BLD AUTO: 0.43 X10*3/UL (ref 0–0.7)
EOSINOPHIL NFR BLD AUTO: 3.9 %
ERYTHROCYTE [DISTWIDTH] IN BLOOD BY AUTOMATED COUNT: 23.6 % (ref 11.5–14.5)
GLUCOSE SERPL-MCNC: 102 MG/DL (ref 74–99)
HCT VFR BLD AUTO: 25.8 % (ref 36–46)
HGB BLD-MCNC: 8.1 G/DL (ref 12–16)
HGB RETIC QN: 23 PG (ref 28–38)
HYPOCHROMIA BLD QL SMEAR: NORMAL
IMM GRANULOCYTES # BLD AUTO: 0.04 X10*3/UL (ref 0–0.7)
IMM GRANULOCYTES NFR BLD AUTO: 0.4 % (ref 0–0.9)
IMMATURE RETIC FRACTION: 30.4 %
INR PPP: 1.4 (ref 0.9–1.1)
LDH SERPL L TO P-CCNC: 182 U/L (ref 84–246)
LYMPHOCYTES # BLD AUTO: 2.17 X10*3/UL (ref 1.2–4.8)
LYMPHOCYTES NFR BLD AUTO: 19.7 %
MAGNESIUM SERPL-MCNC: 2.25 MG/DL (ref 1.6–2.4)
MCH RBC QN AUTO: 27.4 PG (ref 26–34)
MCHC RBC AUTO-ENTMCNC: 31.4 G/DL (ref 32–36)
MCV RBC AUTO: 87 FL (ref 80–100)
MONOCYTES # BLD AUTO: 0.95 X10*3/UL (ref 0.1–1)
MONOCYTES NFR BLD AUTO: 8.6 %
NEUTROPHILS # BLD AUTO: 7.43 X10*3/UL (ref 1.2–7.7)
NEUTROPHILS NFR BLD AUTO: 67.2 %
NRBC BLD-RTO: 31.1 /100 WBCS (ref 0–0)
PHOSPHATE SERPL-MCNC: 2.9 MG/DL (ref 2.5–4.9)
PLATELET # BLD AUTO: 225 X10*3/UL (ref 150–450)
POLYCHROMASIA BLD QL SMEAR: NORMAL
POTASSIUM SERPL-SCNC: 4.1 MMOL/L (ref 3.5–5.3)
PROT SERPL-MCNC: 6.4 G/DL (ref 6.4–8.2)
PROTHROMBIN TIME: 15.7 SECONDS (ref 9.8–12.4)
RBC # BLD AUTO: 2.96 X10*6/UL (ref 4–5.2)
RBC MORPH BLD: NORMAL
RETICS #: 0.2 X10*6/UL (ref 0.02–0.08)
RETICS/RBC NFR AUTO: 6.7 % (ref 0.5–2)
SODIUM SERPL-SCNC: 138 MMOL/L (ref 136–145)
TARGETS BLD QL SMEAR: NORMAL
UFH PPP CHRO-ACNC: 0.3 IU/ML (ref ?–1.1)
WBC # BLD AUTO: 11 X10*3/UL (ref 4.4–11.3)

## 2025-05-11 PROCEDURE — 80053 COMPREHEN METABOLIC PANEL: CPT

## 2025-05-11 PROCEDURE — 83735 ASSAY OF MAGNESIUM: CPT

## 2025-05-11 PROCEDURE — 83615 LACTATE (LD) (LDH) ENZYME: CPT

## 2025-05-11 PROCEDURE — 36415 COLL VENOUS BLD VENIPUNCTURE: CPT

## 2025-05-11 PROCEDURE — 2500000001 HC RX 250 WO HCPCS SELF ADMINISTERED DRUGS (ALT 637 FOR MEDICARE OP)

## 2025-05-11 PROCEDURE — 2500000004 HC RX 250 GENERAL PHARMACY W/ HCPCS (ALT 636 FOR OP/ED): Mod: JZ

## 2025-05-11 PROCEDURE — 1170000001 HC PRIVATE ONCOLOGY ROOM DAILY

## 2025-05-11 PROCEDURE — 2500000005 HC RX 250 GENERAL PHARMACY W/O HCPCS

## 2025-05-11 PROCEDURE — 84100 ASSAY OF PHOSPHORUS: CPT

## 2025-05-11 PROCEDURE — 85045 AUTOMATED RETICULOCYTE COUNT: CPT

## 2025-05-11 PROCEDURE — 99233 SBSQ HOSP IP/OBS HIGH 50: CPT | Performed by: INTERNAL MEDICINE

## 2025-05-11 PROCEDURE — 2500000002 HC RX 250 W HCPCS SELF ADMINISTERED DRUGS (ALT 637 FOR MEDICARE OP, ALT 636 FOR OP/ED)

## 2025-05-11 PROCEDURE — 85610 PROTHROMBIN TIME: CPT

## 2025-05-11 PROCEDURE — 85025 COMPLETE CBC W/AUTO DIFF WBC: CPT

## 2025-05-11 PROCEDURE — 2500000004 HC RX 250 GENERAL PHARMACY W/ HCPCS (ALT 636 FOR OP/ED): Mod: JZ,TB

## 2025-05-11 PROCEDURE — 85520 HEPARIN ASSAY: CPT

## 2025-05-11 RX ORDER — LIDOCAINE 560 MG/1
1 PATCH PERCUTANEOUS; TOPICAL; TRANSDERMAL DAILY
Status: DISCONTINUED | OUTPATIENT
Start: 2025-05-11 | End: 2025-05-15 | Stop reason: HOSPADM

## 2025-05-11 RX ADMIN — HYDROMORPHONE HYDROCHLORIDE 1 MG: 1 INJECTION, SOLUTION INTRAMUSCULAR; INTRAVENOUS; SUBCUTANEOUS at 10:39

## 2025-05-11 RX ADMIN — HYDROMORPHONE HYDROCHLORIDE 1 MG: 1 INJECTION, SOLUTION INTRAMUSCULAR; INTRAVENOUS; SUBCUTANEOUS at 06:15

## 2025-05-11 RX ADMIN — SENNOSIDES AND DOCUSATE SODIUM 2 TABLET: 50; 8.6 TABLET ORAL at 21:24

## 2025-05-11 RX ADMIN — ATROPINE SULFATE 1 DROP: 10 SOLUTION/ DROPS OPHTHALMIC at 23:01

## 2025-05-11 RX ADMIN — OXYCODONE 10 MG: 5 TABLET ORAL at 13:54

## 2025-05-11 RX ADMIN — OXYCODONE 10 MG: 5 TABLET ORAL at 03:19

## 2025-05-11 RX ADMIN — Medication 4 L/MIN: at 08:24

## 2025-05-11 RX ADMIN — CETIRIZINE HYDROCHLORIDE 10 MG: 10 TABLET, FILM COATED ORAL at 08:23

## 2025-05-11 RX ADMIN — HYDROMORPHONE HYDROCHLORIDE 1 MG: 1 INJECTION, SOLUTION INTRAMUSCULAR; INTRAVENOUS; SUBCUTANEOUS at 15:45

## 2025-05-11 RX ADMIN — HYDROMORPHONE HYDROCHLORIDE 1 MG: 1 INJECTION, SOLUTION INTRAMUSCULAR; INTRAVENOUS; SUBCUTANEOUS at 19:51

## 2025-05-11 RX ADMIN — CYCLOBENZAPRINE 10 MG: 10 TABLET, FILM COATED ORAL at 08:24

## 2025-05-11 RX ADMIN — CEFTRIAXONE SODIUM 2 G: 2 INJECTION, SOLUTION INTRAVENOUS at 19:51

## 2025-05-11 RX ADMIN — HEPARIN SODIUM 1100 UNITS/HR: 10000 INJECTION, SOLUTION INTRAVENOUS at 10:46

## 2025-05-11 RX ADMIN — CEFTRIAXONE SODIUM 2 G: 2 INJECTION, SOLUTION INTRAVENOUS at 02:38

## 2025-05-11 RX ADMIN — OXYCODONE 10 MG: 5 TABLET ORAL at 23:00

## 2025-05-11 RX ADMIN — Medication 4 L/MIN: at 19:52

## 2025-05-11 RX ADMIN — FOLIC ACID 1 MG: 1 TABLET ORAL at 08:24

## 2025-05-11 RX ADMIN — METOPROLOL TARTRATE 25 MG: 25 TABLET, FILM COATED ORAL at 08:24

## 2025-05-11 RX ADMIN — OXYCODONE HYDROCHLORIDE AND ACETAMINOPHEN 500 MG: 500 TABLET ORAL at 08:24

## 2025-05-11 RX ADMIN — OXYCODONE 10 MG: 5 TABLET ORAL at 18:03

## 2025-05-11 RX ADMIN — METOPROLOL TARTRATE 25 MG: 25 TABLET, FILM COATED ORAL at 21:24

## 2025-05-11 RX ADMIN — OXYCODONE 10 MG: 5 TABLET ORAL at 08:24

## 2025-05-11 RX ADMIN — CYCLOBENZAPRINE 10 MG: 10 TABLET, FILM COATED ORAL at 21:24

## 2025-05-11 RX ADMIN — WARFARIN SODIUM 5 MG: 5 TABLET ORAL at 21:24

## 2025-05-11 ASSESSMENT — PAIN SCALES - GENERAL
PAINLEVEL_OUTOF10: 7
PAINLEVEL_OUTOF10: 9
PAINLEVEL_OUTOF10: 8
PAINLEVEL_OUTOF10: 9
PAINLEVEL_OUTOF10: 9
PAINLEVEL_OUTOF10: 8
PAINLEVEL_OUTOF10: 8
PAINLEVEL_OUTOF10: 7
PAINLEVEL_OUTOF10: 8
PAINLEVEL_OUTOF10: 7
PAINLEVEL_OUTOF10: 9
PAINLEVEL_OUTOF10: 7
PAINLEVEL_OUTOF10: 7
PAINLEVEL_OUTOF10: 9
PAINLEVEL_OUTOF10: 9
PAINLEVEL_OUTOF10: 7
PAINLEVEL_OUTOF10: 9
PAINLEVEL_OUTOF10: 8
PAINLEVEL_OUTOF10: 8
PAINLEVEL_OUTOF10: 9
PAINLEVEL_OUTOF10: 8

## 2025-05-11 ASSESSMENT — PAIN - FUNCTIONAL ASSESSMENT

## 2025-05-11 ASSESSMENT — COGNITIVE AND FUNCTIONAL STATUS - GENERAL
MOBILITY SCORE: 22
CLIMB 3 TO 5 STEPS WITH RAILING: A LITTLE
MOBILITY SCORE: 23
CLIMB 3 TO 5 STEPS WITH RAILING: A LITTLE
DAILY ACTIVITIY SCORE: 24
DAILY ACTIVITIY SCORE: 24
WALKING IN HOSPITAL ROOM: A LITTLE

## 2025-05-11 ASSESSMENT — ACTIVITIES OF DAILY LIVING (ADL): EFFECT OF PAIN ON DAILY ACTIVITIES: 36.8

## 2025-05-11 ASSESSMENT — PAIN DESCRIPTION - LOCATION
LOCATION: OTHER (COMMENT)
LOCATION: OTHER (COMMENT)

## 2025-05-11 ASSESSMENT — PAIN DESCRIPTION - DESCRIPTORS
DESCRIPTORS: ACHING
DESCRIPTORS: ACHING

## 2025-05-11 NOTE — PROGRESS NOTES
"Senait Narvaez is a 55 y.o. female on day 4 of admission presenting with Pneumonia due to infectious organism, unspecified laterality, unspecified part of lung.    Subjective   Patient seen at bedside. Complaining of pain in back and shoulders, otherwise doing well. Says she did have a bowel movement yesterday.        Objective     Physical Exam  Constitutional:       Appearance: She is obese.   HENT:      Head: Normocephalic.      Nose: Nose normal.      Mouth/Throat:      Mouth: Mucous membranes are moist.   Eyes:      Pupils: Pupils are equal, round, and reactive to light.   Cardiovascular:      Rate and Rhythm: Normal rate.      Pulses: Normal pulses.   Pulmonary:      Effort: Pulmonary effort is normal.   Abdominal:      Palpations: Abdomen is soft.   Musculoskeletal:         General: Normal range of motion.      Right lower leg: Edema present.   Skin:     General: Skin is warm.      Capillary Refill: Capillary refill takes less than 2 seconds.   Neurological:      General: No focal deficit present.      Mental Status: She is alert.   Psychiatric:         Mood and Affect: Mood normal.         Behavior: Behavior normal.         Last Recorded Vitals  Blood pressure 102/66, pulse 58, temperature 36.2 °C (97.2 °F), temperature source Temporal, resp. rate 18, height 1.651 m (5' 5\"), weight 107 kg (235 lb 0.2 oz), SpO2 100%.  Intake/Output last 3 Shifts:  I/O last 3 completed shifts:  In: 1030 (9.7 mL/kg) [P.O.:860; I.V.:20 (0.2 mL/kg); IV Piggyback:150]  Out: 0 (0 mL/kg)   Weight: 106.6 kg     Relevant Results             Assessment & Plan  Pneumonia due to infectious organism, unspecified laterality, unspecified part of lung    Senait Narvaez is a 55 y.o. 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 On arrival found to be hypoxic to 82% on RA, placed on 2L O2 via NC. Patient admitted for pain control. CXR on admit showing consolidation vs atelectasis. Started on Azithromycin and Ceftriaxone (5/7-current). Later, transferred to the MICU  5/8-5/9 for hypotension and hypercanpic respiratory failure that inproved with BIPAP, s/p exchange transfusion 5/8, and transferred back to [place] on 5/9.  She remains inpatient while bridging back to warfarin and managing pain, INR still not at goal and still requiring IV opioids, will continue with pain control and abx.     5/11 updates  - INR 1.4 today, continue to monitor  - Lidocaine patch added for pain control  - Continue CTX, end date 5/13  - Cr continuing to improve  - Still on 4 L oxygen (b/l 4L at night per patietn)     # Hb SC disease   # Acute on chronic SCD related pain crisis  - currently on q6 week RBC exchange transfusions (most recent outpatient 4/18 and inpatient 5/8)  - Carepath reviewed, last updated 4/16/25  - OARRS reviewed, no aberrant behavior noted   - Apheresis line in place from exchange   - Continue oxycodone 5mg PRN for moderate pain, and 10mg PRN for severe pain.  dilaudid 1 mg IV q4h PRN for breakthrough pain  - Cyclobenzaprine 10 mg every 8 hours prn   - Bowel regimen for opioid induced constipation with Senna 2tabs BID and Miralax daily, PO Benadryl 25 mg q6h PRN for opioid-induced pruritus, PO Zofran 8 mg q8h PRN for opioid-induced nausea  - c/w home folic acid 1 mg daily  - Utox: + cannabinoid (3/4)   - IS encouraged      #AHRF   #acute hypercapnic respiratory failure - resolved  #cf acute chest syndrome  #cf CAP  #pulmonary HTN  -CPAP ordered qHS  -Continue CTX til 5/13     #HFpEF  #pHTN  #troponinemia   ::Last TTE today: HFpEF with EF of 67% right ventricular overloaded , severely enlarged RV "   ::troponin leak likely iso demand  - on lasix 20 every other day at home  - trop 320 ->310      #hx of SVT  -Metoprolol tartrate 25 mg BID  -tele     #CHARLES-improving   #hyperkalemia - resolved  ::Baseline Cr ~1.0, 1.32 today  ::Some concern for kidney involvement of sickle cell crisis  -lokelma 10g q8  - urine electrolytes  - avoid nephrotoxins  - sickle cell crisis management as below  - nephrology consulted recs appreciated, no nidication for dialysis     #hx of DVT/PE  #hx of SVC syndrome  ::On warfarin at home, INR 1.5 on admission  - heparin gtt  - Warfarin restarted 5/9 will need to bridge with heparin gtt      Dispo   - Discharge after warfarin bridge and improvement in CHARLES and pain   - FUV ophtho 5/21, requested closer FUV with Dr. Whaley, cards 8/25  - Emergency contact Damian Duffy [place] [phone]      I spent 60 minutes in the professional and overall care of this patient.    Patient discussed with Dr. Cr.     Emily Krishnamurthy MD

## 2025-05-11 NOTE — CARE PLAN
The patient's goals for the shift include      The clinical goals for the shift include patient will remain safe and free from injury this shift 5/11/25 1900      Problem: Pain - Adult  Goal: Verbalizes/displays adequate comfort level or baseline comfort level  Outcome: Progressing     Problem: Safety - Adult  Goal: Free from fall injury  Outcome: Progressing     Problem: Fall/Injury  Goal: Not fall by end of shift  Outcome: Progressing  Goal: Be free from injury by end of the shift  Outcome: Progressing  Goal: Verbalize understanding of personal risk factors for fall in the hospital  Outcome: Progressing  Goal: Verbalize understanding of risk factor reduction measures to prevent injury from fall in the home  Outcome: Progressing  Goal: Use assistive devices by end of the shift  Outcome: Progressing  Goal: Pace activities to prevent fatigue by end of the shift  Outcome: Progressing

## 2025-05-11 NOTE — CARE PLAN
Problem: Pain - Adult  Goal: Verbalizes/displays adequate comfort level or baseline comfort level  Outcome: Progressing     Problem: Safety - Adult  Goal: Free from fall injury  Outcome: Progressing     Problem: Discharge Planning  Goal: Discharge to home or other facility with appropriate resources  Outcome: Progressing     Problem: Chronic Conditions and Co-morbidities  Goal: Patient's chronic conditions and co-morbidity symptoms are monitored and maintained or improved  Outcome: Progressing     Problem: Nutrition  Goal: Nutrient intake appropriate for maintaining nutritional needs  Outcome: Progressing     Problem: Fall/Injury  Goal: Not fall by end of shift  Outcome: Progressing  Goal: Be free from injury by end of the shift  Outcome: Progressing  Goal: Verbalize understanding of personal risk factors for fall in the hospital  Outcome: Progressing  Goal: Verbalize understanding of risk factor reduction measures to prevent injury from fall in the home  Outcome: Progressing  Goal: Use assistive devices by end of the shift  Outcome: Progressing  Goal: Pace activities to prevent fatigue by end of the shift  Outcome: Progressing     Problem: Pain  Goal: Takes deep breaths with improved pain control throughout the shift  Outcome: Progressing  Goal: Turns in bed with improved pain control throughout the shift  Outcome: Progressing  Goal: Walks with improved pain control throughout the shift  Outcome: Progressing  Goal: Performs ADL's with improved pain control throughout shift  Outcome: Progressing  Goal: Participates in PT with improved pain control throughout the shift  Outcome: Progressing  Goal: Free from opioid side effects throughout the shift  Outcome: Progressing  Goal: Free from acute confusion related to pain meds throughout the shift  Outcome: Progressing     Problem: Skin  Goal: Participates in plan/prevention/treatment measures  Outcome: Progressing  Goal: Prevent/manage excess moisture  Outcome:  Progressing  Goal: Prevent/minimize sheer/friction injuries  Outcome: Progressing  Goal: Promote/optimize nutrition  Outcome: Progressing  Goal: Promote skin healing  Outcome: Progressing   The patient's goals for the shift include  rest and comfort    The clinical goals for the shift include pt will not fall throughout shift    Over the shift, the patient did not make progress toward the following goals. Barriers to progression include pain. Recommendations to address these barriers include prn meds.

## 2025-05-11 NOTE — NURSING NOTE
Charles consulted by bedside RN for PIV placement. Vast RN to bedside to assess. Scanned patient's bilateral upper extremities via ultrasound, no vessels noted for IV cannulation. Bedside RN made aware, alternative access needed.

## 2025-05-12 LAB
ALBUMIN SERPL BCP-MCNC: 3.3 G/DL (ref 3.4–5)
ALP SERPL-CCNC: 98 U/L (ref 33–110)
ALT SERPL W P-5'-P-CCNC: 9 U/L (ref 7–45)
ANION GAP SERPL CALC-SCNC: 11 MMOL/L (ref 10–20)
AST SERPL W P-5'-P-CCNC: 13 U/L (ref 9–39)
BASOPHILS # BLD AUTO: 0.03 X10*3/UL (ref 0–0.1)
BASOPHILS NFR BLD AUTO: 0.3 %
BILIRUB SERPL-MCNC: 0.7 MG/DL (ref 0–1.2)
BUN SERPL-MCNC: 18 MG/DL (ref 6–23)
CALCIUM SERPL-MCNC: 8.6 MG/DL (ref 8.6–10.6)
CHLORIDE SERPL-SCNC: 96 MMOL/L (ref 98–107)
CO2 SERPL-SCNC: 34 MMOL/L (ref 21–32)
CREAT SERPL-MCNC: 1.24 MG/DL (ref 0.5–1.05)
EGFRCR SERPLBLD CKD-EPI 2021: 51 ML/MIN/1.73M*2
EOSINOPHIL # BLD AUTO: 0.44 X10*3/UL (ref 0–0.7)
EOSINOPHIL NFR BLD AUTO: 4.7 %
ERYTHROCYTE [DISTWIDTH] IN BLOOD BY AUTOMATED COUNT: 23.9 % (ref 11.5–14.5)
GLUCOSE SERPL-MCNC: 94 MG/DL (ref 74–99)
HCT VFR BLD AUTO: 27.1 % (ref 36–46)
HEMOGLOBIN A2: 2.8 % (ref 2–3.5)
HEMOGLOBIN A2: 2.9 % (ref 2–3.5)
HEMOGLOBIN A: 50.9 % (ref 95.8–98)
HEMOGLOBIN A: 51.6 % (ref 95.8–98)
HEMOGLOBIN A: 77.6 % (ref 95.8–98)
HEMOGLOBIN A: 77.8 % (ref 95.8–98)
HEMOGLOBIN C: 22.3 %
HEMOGLOBIN C: 22.4 %
HEMOGLOBIN C: 9.3 %
HEMOGLOBIN C: 9.4 %
HEMOGLOBIN F: 0.3 % (ref 0–2)
HEMOGLOBIN IDENTIFICATION INTERPRETATION: ABNORMAL
HEMOGLOBIN S: 23 %
HEMOGLOBIN S: 23.5 %
HEMOGLOBIN S: 9.7 %
HEMOGLOBIN S: 9.8 %
HGB BLD-MCNC: 8.2 G/DL (ref 12–16)
HGB RETIC QN: 26 PG (ref 28–38)
HYPOCHROMIA BLD QL SMEAR: NORMAL
IMM GRANULOCYTES # BLD AUTO: 0.04 X10*3/UL (ref 0–0.7)
IMM GRANULOCYTES NFR BLD AUTO: 0.4 % (ref 0–0.9)
IMMATURE RETIC FRACTION: 30.4 %
INR PPP: 1.5 (ref 0.9–1.1)
LDH SERPL L TO P-CCNC: 194 U/L (ref 84–246)
LYMPHOCYTES # BLD AUTO: 1.96 X10*3/UL (ref 1.2–4.8)
LYMPHOCYTES NFR BLD AUTO: 20.9 %
MAGNESIUM SERPL-MCNC: 2.24 MG/DL (ref 1.6–2.4)
MCH RBC QN AUTO: 27.3 PG (ref 26–34)
MCHC RBC AUTO-ENTMCNC: 30.3 G/DL (ref 32–36)
MCV RBC AUTO: 90 FL (ref 80–100)
MONOCYTES # BLD AUTO: 0.84 X10*3/UL (ref 0.1–1)
MONOCYTES NFR BLD AUTO: 8.9 %
NEUTROPHILS # BLD AUTO: 6.09 X10*3/UL (ref 1.2–7.7)
NEUTROPHILS NFR BLD AUTO: 64.8 %
NRBC BLD-RTO: 34.3 /100 WBCS (ref 0–0)
PATH REVIEW-HGB IDENTIFICATION: ABNORMAL
PHOSPHATE SERPL-MCNC: 4 MG/DL (ref 2.5–4.9)
PLATELET # BLD AUTO: 233 X10*3/UL (ref 150–450)
POLYCHROMASIA BLD QL SMEAR: NORMAL
POTASSIUM SERPL-SCNC: 4.5 MMOL/L (ref 3.5–5.3)
PROT SERPL-MCNC: 6.3 G/DL (ref 6.4–8.2)
PROTHROMBIN TIME: 16.6 SECONDS (ref 9.8–12.4)
RBC # BLD AUTO: 3 X10*6/UL (ref 4–5.2)
RBC MORPH BLD: NORMAL
RETICS #: 0.23 X10*6/UL (ref 0.02–0.08)
RETICS/RBC NFR AUTO: 7.6 % (ref 0.5–2)
SODIUM SERPL-SCNC: 136 MMOL/L (ref 136–145)
TARGETS BLD QL SMEAR: NORMAL
UFH PPP CHRO-ACNC: 0.3 IU/ML (ref ?–1.1)
WBC # BLD AUTO: 9.4 X10*3/UL (ref 4.4–11.3)

## 2025-05-12 PROCEDURE — 2500000004 HC RX 250 GENERAL PHARMACY W/ HCPCS (ALT 636 FOR OP/ED): Mod: JZ

## 2025-05-12 PROCEDURE — 99233 SBSQ HOSP IP/OBS HIGH 50: CPT

## 2025-05-12 PROCEDURE — 99221 1ST HOSP IP/OBS SF/LOW 40: CPT

## 2025-05-12 PROCEDURE — 2500000001 HC RX 250 WO HCPCS SELF ADMINISTERED DRUGS (ALT 637 FOR MEDICARE OP)

## 2025-05-12 PROCEDURE — 80069 RENAL FUNCTION PANEL: CPT

## 2025-05-12 PROCEDURE — 2500000004 HC RX 250 GENERAL PHARMACY W/ HCPCS (ALT 636 FOR OP/ED)

## 2025-05-12 PROCEDURE — 2500000005 HC RX 250 GENERAL PHARMACY W/O HCPCS

## 2025-05-12 PROCEDURE — 1200000003 HC ONCOLOGY  ROOM WITH TELEMETRY DAILY

## 2025-05-12 PROCEDURE — 83735 ASSAY OF MAGNESIUM: CPT

## 2025-05-12 PROCEDURE — 36415 COLL VENOUS BLD VENIPUNCTURE: CPT

## 2025-05-12 PROCEDURE — 85045 AUTOMATED RETICULOCYTE COUNT: CPT

## 2025-05-12 PROCEDURE — 83615 LACTATE (LD) (LDH) ENZYME: CPT

## 2025-05-12 PROCEDURE — 84100 ASSAY OF PHOSPHORUS: CPT

## 2025-05-12 PROCEDURE — 85025 COMPLETE CBC W/AUTO DIFF WBC: CPT

## 2025-05-12 PROCEDURE — 2500000004 HC RX 250 GENERAL PHARMACY W/ HCPCS (ALT 636 FOR OP/ED): Mod: JZ,TB

## 2025-05-12 PROCEDURE — 2500000002 HC RX 250 W HCPCS SELF ADMINISTERED DRUGS (ALT 637 FOR MEDICARE OP, ALT 636 FOR OP/ED)

## 2025-05-12 PROCEDURE — 85610 PROTHROMBIN TIME: CPT

## 2025-05-12 PROCEDURE — 85520 HEPARIN ASSAY: CPT

## 2025-05-12 RX ORDER — OXYCODONE HYDROCHLORIDE 10 MG/1
10 TABLET ORAL EVERY 4 HOURS PRN
Refills: 0 | Status: DISCONTINUED | OUTPATIENT
Start: 2025-05-12 | End: 2025-05-13

## 2025-05-12 RX ORDER — AMOXICILLIN 250 MG
1 CAPSULE ORAL 2 TIMES DAILY
Status: DISCONTINUED | OUTPATIENT
Start: 2025-05-12 | End: 2025-05-15 | Stop reason: HOSPADM

## 2025-05-12 RX ORDER — GUAIFENESIN 600 MG/1
600 TABLET, EXTENDED RELEASE ORAL 2 TIMES DAILY
Status: DISCONTINUED | OUTPATIENT
Start: 2025-05-12 | End: 2025-05-15 | Stop reason: HOSPADM

## 2025-05-12 RX ORDER — OXYCODONE HYDROCHLORIDE 5 MG/1
5 TABLET ORAL EVERY 4 HOURS PRN
Refills: 0 | Status: DISCONTINUED | OUTPATIENT
Start: 2025-05-12 | End: 2025-05-13

## 2025-05-12 RX ADMIN — METOPROLOL TARTRATE 25 MG: 25 TABLET, FILM COATED ORAL at 20:53

## 2025-05-12 RX ADMIN — HYDROMORPHONE HYDROCHLORIDE 1 MG: 1 INJECTION, SOLUTION INTRAMUSCULAR; INTRAVENOUS; SUBCUTANEOUS at 17:50

## 2025-05-12 RX ADMIN — HYDROMORPHONE HYDROCHLORIDE 1 MG: 1 INJECTION, SOLUTION INTRAMUSCULAR; INTRAVENOUS; SUBCUTANEOUS at 13:30

## 2025-05-12 RX ADMIN — FUROSEMIDE 20 MG: 20 TABLET ORAL at 09:29

## 2025-05-12 RX ADMIN — GUAIFENESIN 600 MG: 600 TABLET, EXTENDED RELEASE ORAL at 11:29

## 2025-05-12 RX ADMIN — CETIRIZINE HYDROCHLORIDE 10 MG: 10 TABLET, FILM COATED ORAL at 09:29

## 2025-05-12 RX ADMIN — Medication 4 L/MIN: at 08:00

## 2025-05-12 RX ADMIN — FOLIC ACID 1 MG: 1 TABLET ORAL at 09:29

## 2025-05-12 RX ADMIN — CEFTRIAXONE SODIUM 2 G: 2 INJECTION, SOLUTION INTRAVENOUS at 20:53

## 2025-05-12 RX ADMIN — METOPROLOL TARTRATE 25 MG: 25 TABLET, FILM COATED ORAL at 09:29

## 2025-05-12 RX ADMIN — ATROPINE SULFATE 1 DROP: 10 SOLUTION/ DROPS OPHTHALMIC at 20:54

## 2025-05-12 RX ADMIN — ONDANSETRON HYDROCHLORIDE 4 MG: 4 TABLET, FILM COATED ORAL at 17:50

## 2025-05-12 RX ADMIN — HYDROMORPHONE HYDROCHLORIDE 1 MG: 1 INJECTION, SOLUTION INTRAMUSCULAR; INTRAVENOUS; SUBCUTANEOUS at 09:29

## 2025-05-12 RX ADMIN — HYDROMORPHONE HYDROCHLORIDE 1 MG: 1 INJECTION, SOLUTION INTRAMUSCULAR; INTRAVENOUS; SUBCUTANEOUS at 00:16

## 2025-05-12 RX ADMIN — HYDROMORPHONE HYDROCHLORIDE 1 MG: 1 INJECTION, SOLUTION INTRAMUSCULAR; INTRAVENOUS; SUBCUTANEOUS at 04:57

## 2025-05-12 RX ADMIN — ONDANSETRON HYDROCHLORIDE 4 MG: 4 TABLET, FILM COATED ORAL at 08:47

## 2025-05-12 RX ADMIN — HEPARIN SODIUM 1100 UNITS/HR: 10000 INJECTION, SOLUTION INTRAVENOUS at 15:26

## 2025-05-12 RX ADMIN — HYDROMORPHONE HYDROCHLORIDE 1 MG: 1 INJECTION, SOLUTION INTRAMUSCULAR; INTRAVENOUS; SUBCUTANEOUS at 21:50

## 2025-05-12 RX ADMIN — WARFARIN SODIUM 5 MG: 5 TABLET ORAL at 18:20

## 2025-05-12 RX ADMIN — OXYCODONE 10 MG: 5 TABLET ORAL at 11:35

## 2025-05-12 RX ADMIN — OXYCODONE HYDROCHLORIDE AND ACETAMINOPHEN 500 MG: 500 TABLET ORAL at 09:29

## 2025-05-12 RX ADMIN — Medication 2 L/MIN: at 20:53

## 2025-05-12 RX ADMIN — OXYCODONE 10 MG: 5 TABLET ORAL at 03:10

## 2025-05-12 RX ADMIN — SENNOSIDES AND DOCUSATE SODIUM 1 TABLET: 50; 8.6 TABLET ORAL at 09:29

## 2025-05-12 RX ADMIN — GUAIFENESIN 600 MG: 600 TABLET, EXTENDED RELEASE ORAL at 20:52

## 2025-05-12 RX ADMIN — CYCLOBENZAPRINE 10 MG: 10 TABLET, FILM COATED ORAL at 21:50

## 2025-05-12 RX ADMIN — SENNOSIDES AND DOCUSATE SODIUM 1 TABLET: 50; 8.6 TABLET ORAL at 20:52

## 2025-05-12 ASSESSMENT — ENCOUNTER SYMPTOMS
MYALGIAS: 1
ACTIVITY CHANGE: 1
FATIGUE: 1
BACK PAIN: 1
DYSPHORIC MOOD: 1
NAUSEA: 1
NERVOUS/ANXIOUS: 1
SLEEP DISTURBANCE: 1

## 2025-05-12 ASSESSMENT — COGNITIVE AND FUNCTIONAL STATUS - GENERAL
MOBILITY SCORE: 23
DAILY ACTIVITIY SCORE: 24
CLIMB 3 TO 5 STEPS WITH RAILING: A LITTLE

## 2025-05-12 ASSESSMENT — PAIN SCALES - GENERAL
PAINLEVEL_OUTOF10: 7
PAINLEVEL_OUTOF10: 7
PAINLEVEL_OUTOF10: 8
PAINLEVEL_OUTOF10: 8
PAINLEVEL_OUTOF10: 7
PAINLEVEL_OUTOF10: 7
PAINLEVEL_OUTOF10: 9
PAINLEVEL_OUTOF10: 8
PAINLEVEL_OUTOF10: 7
PAINLEVEL_OUTOF10: 7
PAINLEVEL_OUTOF10: 8
PAINLEVEL_OUTOF10: 7
PAINLEVEL_OUTOF10: 8

## 2025-05-12 ASSESSMENT — PAIN - FUNCTIONAL ASSESSMENT
PAIN_FUNCTIONAL_ASSESSMENT: 0-10
PAIN_FUNCTIONAL_ASSESSMENT: UNABLE TO SELF-REPORT
PAIN_FUNCTIONAL_ASSESSMENT: 0-10
PAIN_FUNCTIONAL_ASSESSMENT: UNABLE TO SELF-REPORT
PAIN_FUNCTIONAL_ASSESSMENT: 0-10
PAIN_FUNCTIONAL_ASSESSMENT: 0-10

## 2025-05-12 ASSESSMENT — PAIN DESCRIPTION - LOCATION: LOCATION: GENERALIZED

## 2025-05-12 NOTE — PROGRESS NOTES
"Seniat Narvaez is a 55 y.o. female on day 5 of admission presenting with Pneumonia due to infectious organism, unspecified laterality, unspecified part of lung.    Subjective   Patient seen at bedside. Complaining of pain in back and shoulders, which she rates 7/10. Discussed starting to rotate tomorrow. She does still endorse productive cough, sore throat, and mild congestion although improving since admission. Will order more supportive medications today for symptom management. Says she did have a bowel movement yesterday. States that RLE is more swollen, discussed US today. She is currently wearing her oxygen, discussed taking it off today to see how her levels are, patient agreeable. Denies any N/V/D/C, fever/chills, SOB, CP or heart palpitations.      Objective     Physical Exam  Constitutional:       Appearance: She is obese.   HENT:      Head: Normocephalic.      Nose: Nose normal.      Mouth/Throat:      Mouth: Mucous membranes are moist.   Eyes:      Pupils: Pupils are equal, round, and reactive to light.   Cardiovascular:      Rate and Rhythm: Normal rate.      Pulses: Normal pulses.   Pulmonary:      Effort: Pulmonary effort is normal.   Abdominal:      Palpations: Abdomen is soft.   Musculoskeletal:         General: Normal range of motion.      Right lower leg: Edema present.   Skin:     General: Skin is warm.      Capillary Refill: Capillary refill takes less than 2 seconds.   Neurological:      General: No focal deficit present.      Mental Status: She is alert.   Psychiatric:         Mood and Affect: Mood normal.         Behavior: Behavior normal.         Last Recorded Vitals  Blood pressure 95/62, pulse 63, temperature 36 °C (96.8 °F), temperature source Temporal, resp. rate 18, height 1.651 m (5' 5\"), weight 107 kg (235 lb 0.2 oz), SpO2 97%.  Intake/Output last 3 Shifts:  I/O last 3 completed shifts:  In: 430 (4 mL/kg) [P.O.:360; I.V.:20 (0.2 mL/kg); IV Piggyback:50]  Out: 0 (0 mL/kg)   Weight: 106.6 " "kg     Relevant Results  Scheduled medications  Scheduled Medications[1]  Continuous medications  Continuous Medications[2]  PRN medications  PRN Medications[3]   Assessment & Plan  Pneumonia due to infectious organism, unspecified laterality, unspecified part of lung    Senait Narvaez is a 55 y.o. 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 On arrival found to be hypoxic to 82% on RA, placed on 2L O2 via NC. Patient admitted for pain control. CXR on admit showing consolidation vs atelectasis. Started on Azithromycin (5/8-5/10) and Ceftriaxone (5/8-end date 5/13). Later that evening, transferred to the MICU 5/8-5/9 for hypotension and hypercanpic hypoxic respiratory failure that inproved with BIPAP, s/p exchange transfusion 5/8, and transferred back to [place] on 5/9. She remains inpatient while bridging back to warfarin and managing pain, INR still not at goal and still requiring IV opioids. RLE with mild swelling, US duplex RLE pending 5/12. Discharge pending therapeutic INR and improvement in pain.     Updates 5/12:   - INR 1.5 today, continue to monitor; spoke with pharmacy about changing to lovenox with warfarin bridge like previous admission given improvement in CHARLES. Pharmacy did not recommend switching to lovenox as it would take longer to become therapeutic. Continue heparin gtt until INR >2   - US duplex BL lower ext pending given swelling and mild right calf pain   - Continue CTX, end date 5/13 for ACS/ CAP   - Cr continuing to improve  - weaned off oxygen this AM; CPAP at night   - VBG ordered for AM to re-assess acidosis      # acute " hypercapnic hypoxic respiratory failure - improved   # acute chest syndrome  # CAP  # pulmonary HTN  - on admission hypoxic to 82% on RA, placed on 2L O2 via NC  - CXR on admit (5/8) showing consolidation vs atelectasis  - transferred to the MICU 5/8-5/9 for hypotension and hypercanpic hypoxic respiratory failure that inproved with BIPAP  - lactate WNL   - VBG showed PH:7.15->7.20->7.23 , Co2 of 86->75->71 with bicarb of 28, respiratory acidosis   - Hgb S 22.6% (5/8)   - s/p exchange transfusion 5/8  - Repeat Hgb S 9.8% (5/9)   - Azithromycin (5/8-5/10) and Ceftriaxone (5/8-end date 5/13)  - CPAP ordered qHS  - VBG ordered 5/12 to re-assess resp status   - Was previous on 4L, weaned to RA 5/12 with O2/ CPAP at night   - pt should be put on CPAP nightly and whenever she takes nap with setting of 16/8   - cont pulse ox   - apheresis line remains in place for hep gtt and labs; will need removed prior to DC     # Hb SC disease   # Acute on chronic SCD related pain crisis  - currently on q6 week RBC exchange transfusions (most recent outpatient 4/18 and inpatient 5/8)  - Carepath reviewed, last updated 4/16/25  - OARRS reviewed, no aberrant behavior noted   - Apheresis line in place from exchange (kept in place for labs and hep gtt)   - Continue oxycodone 5mg PRN for moderate pain, and 10mg PRN for severe pain.  dilaudid 1 mg IV q4h PRN for breakthrough pain  - Cyclobenzaprine 10 mg every 8 hours prn   - Bowel regimen for opioid induced constipation with Senna 2tabs BID and Miralax daily, PO Benadryl 25 mg q6h PRN for opioid-induced pruritus, PO Zofran 8 mg q8h PRN for opioid-induced nausea  - c/w home folic acid 1 mg daily  - Utox: + cannabinoid (3/4)   - IS encouraged      # CHARLES-improving   # hyperkalemia - resolved  - Etiology likely pre-renal secondary to exchange, no new medications initiated this admission   - Baseline Cr ~1.0, 1.32 (5/11) --> 1.24 (5/12)   - s/p lokelma 10g q8  - urine electrolytes (5/9); FeNA  0.1% most likely indicating pre-renal   - PO hydration encouraged, held off on IV fluids as pt is slightly volume overloaded   - avoid nephrotoxins  - nephrology consulted recs appreciated while in MICU, no nidication for dialysis     # hx of DVT/PE  # hx of SVC syndrome  # Mild RLE swelling   - On warfarin at home, INR 1.5 on admission  - heparin gtt  - Warfarin restarted 5/9 will need to bridge with heparin gtt   - INR 1.5 (5/12), continue to monitor; spoke with pharmacy about changing to lovenox with warfarin bridge like previous admission given improvement in CHARLES. Pharmacy did not recommend switching to lovenox as it would take longer to become therapeutic. Continue heparin gtt until INR >2   - US duplex BL lower ext pending (5/12) given swelling and mild right calf pain     # HFpEF  # pHTN  # troponinemia   - Last TTE 5/9/25: HFpEF with EF of 67% right ventricular overloaded , severely enlarged RV   - troponin leak likely iso demand  - on lasix 20 every other day at home  - trop 320 ->310      # hx of SVT  - Metoprolol tartrate 25 mg BID  - tele    # Prophy   - Hep gtt, OOB as able, SCDs while in bed      Dispo   - Discharge after warfarin bridge and improvement in CHARLES and pain   - FUV ophtho 5/21, requested closer FUV with Dr. Whaley, cards 8/25  - Emergency contact Damian Narvaez [phone]      I spent 60 minutes in the professional and overall care of this patient.    Assessment and plan as above discussed with attending physician Dr. Shellie Kerns, APRN-CNP       [1] ascorbic acid, 500 mg, oral, Daily  atropine, 1 drop, Left Eye, Nightly  cefTRIAXone, 2 g, intravenous, q24h  cetirizine, 10 mg, oral, Daily  folic acid, 1 mg, oral, Daily  furosemide, 20 mg, oral, Every other day  guaiFENesin, 600 mg, oral, BID  lidocaine, 1 patch, transdermal, Daily  metoprolol tartrate, 25 mg, oral, BID  oxygen, , inhalation, Continuous - Inhalation  sennosides-docusate sodium, 1 tablet, oral,  BID  warfarin, 5 mg, oral, Once per day on Sunday Monday Wednesday Thursday Friday Saturday  [START ON 5/13/2025] warfarin, 7.5 mg, oral, Every Tuesday  [2] heparin, 0-4,500 Units/hr, Last Rate: 1,100 Units/hr (05/11/25 1046)  [3] PRN medications: albuterol, alteplase, benzocaine-menthol, cyclobenzaprine, diphenhydrAMINE, fluticasone, heparin, HYDROmorphone, ondansetron, oxyCODONE, oxyCODONE, oxygen, phenoL, sodium chloride

## 2025-05-12 NOTE — CONSULTS
"Inpatient consult to Integrative Hem/Onc  Consult performed by: GRISEL Leslie-CNP  Consult ordered by: TOMAS De          Reason For Consult  Symptom management     History Of Present Illness  Senait Narvaez is a 55 y.o. 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 On arrival found to be hypoxic to 82% on RA, placed on 2L O2 via NC. Patient admitted for pain control of acute sickle cell pain crisis. Integrative hematology/oncology consulted by [facility] team for non-pharmacological symptom management of acute sickle cell pain crisis.     Pt resting in bed, intermittently falling asleep during conversation. No family at bedside.        Social Hx: lives in an apartment alone. No children      Spiritual Hx: spiritual     Joys: music - R&B, hip hop, TV         Information obtained from chart review, discussion with patient/family, and discussion with primary team.       Past Medical History  She has a past medical history of Asthma, CHF (congestive heart failure), Chronic pain disorder, Compression of vein (02/19/2020), and Hypotension, unspecified (12/10/2020).    Surgical History  She has a past surgical history that includes Appendectomy (08/05/2013); Cholecystectomy (08/05/2013); Other surgical history (05/13/2014); Other surgical history (10/09/2014); Other surgical history (06/09/2014); MR angio head wo IV contrast (8/16/2014); MR angio neck wo IV contrast (8/16/2014); IR CVC tunneled (3/13/2015); IR CVC tunneled (1/29/2016); IR CVC tunneled (1/17/2018); IR CVC tunneled (2/22/2018); IR CVC tunneled (3/22/2018); IR CVC " tunneled (4/18/2018); IR CVC tunneled (5/16/2018); IR CVC tunneled (6/12/2018); US guided biopsy lymph node superficial (9/17/2019); CT guided percutaneous biopsy LYMPH node superficial (9/19/2019); IR CVC tunneled (1/21/2020); IR CVC tunneled (2/28/2020); IR CVC tunneled (4/10/2020); IR CVC tunneled (5/22/2020); IR CVC tunneled (6/19/2020); IR CVC tunneled (7/23/2020); IR CVC tunneled (9/4/2020); IR CVC tunneled (10/9/2020); IR CVC tunneled (11/13/2020); IR CVC tunneled (12/18/2020); IR CVC tunneled (1/22/2021); IR CVC tunneled (2/26/2021); IR CVC tunneled (4/2/2021); IR CVC tunneled (5/7/2021); IR CVC tunneled (6/11/2021); IR CVC tunneled (7/16/2021); IR CVC tunneled (8/20/2021); IR CVC tunneled (9/24/2021); IR CVC tunneled (10/29/2021); IR CVC tunneled (12/3/2021); IR CVC tunneled (1/7/2022); IR CVC tunneled (2/23/2022); IR CVC tunneled (3/25/2022); IR CVC tunneled (4/22/2022); IR CVC tunneled (6/3/2022); IR CVC tunneled (9/18/2017); IR CVC tunneled (10/30/2017); IR CVC tunneled (12/19/2017); IR CVC tunneled (1/6/2023); IR CVC tunneled (2/24/2023); IR CVC tunneled (7/8/2022); IR CVC tunneled (8/12/2022); IR CVC tunneled (9/16/2022); CT angio neck (10/19/2022); IR CVC tunneled (10/20/2022); IR CVC tunneled (11/29/2022); IR CVC tunneled (3/31/2023); IR CVC tunneled (6/9/2023); IR CVC tunneled (7/20/2023); IR CVC tunneled (8/16/2023); IR CVC tunneled (9/21/2023); IR CVC nontunneled (10/26/2023); IR CVC nontunneled (12/7/2023); and Biopsy (9/15/2024).     Social History  She reports that she quit smoking about 11 years ago. Her smoking use included cigarettes. She has been exposed to tobacco smoke. She has never used smokeless tobacco. She reports that she does not currently use alcohol. She reports that she does not currently use drugs.    Family History  Family History[1]     Allergies  Vancomycin and Oxycontin [oxycodone]    Review of Systems   Constitutional:  Positive for activity change and fatigue.  "  Gastrointestinal:  Positive for nausea.   Musculoskeletal:  Positive for back pain and myalgias.   Psychiatric/Behavioral:  Positive for dysphoric mood and sleep disturbance. The patient is nervous/anxious.         Physical Exam  Vitals reviewed.   Constitutional:       General: She is not in acute distress.     Appearance: Normal appearance. She is obese. She is ill-appearing.      Interventions: Nasal cannula in place.   HENT:      Head: Normocephalic and atraumatic.      Nose: Nose normal.      Mouth/Throat:      Mouth: Mucous membranes are moist.   Eyes:      Extraocular Movements: Extraocular movements intact.      Pupils: Pupils are equal, round, and reactive to light.   Cardiovascular:      Rate and Rhythm: Normal rate.   Pulmonary:      Effort: Pulmonary effort is normal.   Abdominal:      General: Abdomen is flat.      Palpations: Abdomen is soft.   Skin:     General: Skin is warm and dry.   Neurological:      General: No focal deficit present.      Mental Status: She is oriented to person, place, and time and easily aroused. Mental status is at baseline. She is lethargic.   Psychiatric:         Mood and Affect: Mood normal.         Behavior: Behavior normal.          Last Recorded Vitals  Blood pressure 95/62, pulse 63, temperature 36 °C (96.8 °F), temperature source Temporal, resp. rate 18, height 1.651 m (5' 5\"), weight 107 kg (235 lb 0.2 oz), SpO2 97%.    Relevant Results  Scheduled medications  Scheduled Medications[2]  Continuous medications  Continuous Medications[3]  PRN medications  PRN Medications[4]    Results for orders placed or performed during the hospital encounter of 05/07/25 (from the past 24 hours)   Magnesium   Result Value Ref Range    Magnesium 2.24 1.60 - 2.40 mg/dL   CBC and Auto Differential   Result Value Ref Range    WBC 9.4 4.4 - 11.3 x10*3/uL    nRBC 34.3 (H) 0.0 - 0.0 /100 WBCs    RBC 3.00 (L) 4.00 - 5.20 x10*6/uL    Hemoglobin 8.2 (L) 12.0 - 16.0 g/dL    Hematocrit 27.1 (L) " 36.0 - 46.0 %    MCV 90 80 - 100 fL    MCH 27.3 26.0 - 34.0 pg    MCHC 30.3 (L) 32.0 - 36.0 g/dL    RDW 23.9 (H) 11.5 - 14.5 %    Platelets 233 150 - 450 x10*3/uL    Neutrophils % 64.8 40.0 - 80.0 %    Immature Granulocytes %, Automated 0.4 0.0 - 0.9 %    Lymphocytes % 20.9 13.0 - 44.0 %    Monocytes % 8.9 2.0 - 10.0 %    Eosinophils % 4.7 0.0 - 6.0 %    Basophils % 0.3 0.0 - 2.0 %    Neutrophils Absolute 6.09 1.20 - 7.70 x10*3/uL    Immature Granulocytes Absolute, Automated 0.04 0.00 - 0.70 x10*3/uL    Lymphocytes Absolute 1.96 1.20 - 4.80 x10*3/uL    Monocytes Absolute 0.84 0.10 - 1.00 x10*3/uL    Eosinophils Absolute 0.44 0.00 - 0.70 x10*3/uL    Basophils Absolute 0.03 0.00 - 0.10 x10*3/uL   Comprehensive Metabolic Panel   Result Value Ref Range    Glucose 94 74 - 99 mg/dL    Sodium 136 136 - 145 mmol/L    Potassium 4.5 3.5 - 5.3 mmol/L    Chloride 96 (L) 98 - 107 mmol/L    Bicarbonate 34 (H) 21 - 32 mmol/L    Anion Gap 11 10 - 20 mmol/L    Urea Nitrogen 18 6 - 23 mg/dL    Creatinine 1.24 (H) 0.50 - 1.05 mg/dL    eGFR 51 (L) >60 mL/min/1.73m*2    Calcium 8.6 8.6 - 10.6 mg/dL    Albumin 3.3 (L) 3.4 - 5.0 g/dL    Alkaline Phosphatase 98 33 - 110 U/L    Total Protein 6.3 (L) 6.4 - 8.2 g/dL    AST 13 9 - 39 U/L    Bilirubin, Total 0.7 0.0 - 1.2 mg/dL    ALT 9 7 - 45 U/L   Lactate Dehydrogenase   Result Value Ref Range     84 - 246 U/L   Reticulocytes   Result Value Ref Range    Retic % 7.6 (H) 0.5 - 2.0 %    Retic Absolute 0.227 (H) 0.018 - 0.083 x10*6/uL    Reticulocyte Hemoglobin 26 (L) 28 - 38 pg    Immature Retic fraction 30.4 (H) <=16.0 %   Protime-INR   Result Value Ref Range    Protime 16.6 (H) 9.8 - 12.4 seconds    INR 1.5 (H) 0.9 - 1.1   Phosphorus   Result Value Ref Range    Phosphorus 4.0 2.5 - 4.9 mg/dL   Morphology   Result Value Ref Range    RBC Morphology See Below     Polychromasia Mild     Hypochromia Mild     Target Cells Few    Heparin Assay, UFH   Result Value Ref Range    Heparin  Unfractionated 0.3 See Comment Below for Therapeutic Ranges IU/mL     *Note: Due to a large number of results and/or encounters for the requested time period, some results have not been displayed. A complete set of results can be found in Results Review.     Transthoracic Echo (TTE) Limited  Result Date: 5/9/2025   [facility], [address] [place] [address]                Tel [phone] and Fax [phone] TRANSTHORACIC ECHOCARDIOGRAM REPORT  Patient Name:       KIRSTY Saenz Physician:    [id number] Smith Abdalla MD Study Date:         5/8/2025            Ordering Provider:    [id number] YURY DELA CRUZ MRN/PID:            [id number]            Fellow: Accession#:         [id number]        Nurse: Date of Birth/Age:  1970 / 55      Sonographer:          Ene Santos RDCS                     years Gender assigned at                     Additional Staff: Birth: Height:             165.10 cm           Admit Date:           5/7/2025 Weight:             98.88 kg            Admission Status:     Inpatient - STAT BSA / BMI:          2.05 m2 / 36.28     Encounter#:           [id number]                     kg/m2 Blood Pressure:     104/61 mmHg         Department Location:  [address] Study Type:    TRANSTHORACIC ECHO (TTE) LIMITED Diagnosis/ICD: Ischemic cardiomyopathy-I25.5 Indication:    ICM, Hypotension, c/f RV overload CPT Code:      Echo Limited-12086; Doppler Limited-72199; Color Doppler-29395 Patient History: Pertinent History: Chest Pain, Previous DVT, Hyperlipidemia and Cardiomyopathy.                    NSTEMI, Thrombosis in peripherally inserted central catheter,                    Hepatic infarction, SVC syndrome, Sickle cell, Morbid                    obesity, Thromosis of SVC. Study Detail: The following Echo studies were performed: 2D,  M-Mode, Doppler and               color flow. Technically challenging study due to prominent lung               artifact, patient lying in supine position and body habitus.               Definity used as a contrast agent for endocardial border               definition and agitated saline used as a contrast agent for               intraseptal flow evaluation. Total contrast used for this               procedure was 1 mL via IV push. Unable to obtain suprasternal               notch view.  PHYSICIAN INTERPRETATION: Left Ventricle: Left ventricular ejection fraction is normal, calculated by Nazario's biplane at 67%. There are no regional left ventricular wall motion abnormalities. The left ventricular cavity size is decreased. There is mildly increased septal and normal posterior left ventricular wall thickness. The interventricular septum is flattened in systole and diastole, consistent with right ventricular pressure and volume overload. Left ventricular diastolic filling was not assessed. Left Atrium: The left atrial size was not well visualized. A bubble study using agitated saline was performed. Bubble study is negative. Right Ventricle: The right ventricle is severely enlarged. There is reduced right ventricular systolic function. Right Atrium: The right Enlarged. Aortic Valve: The aortic valve was not well visualized. Aortic valve regurgitation was not assessed. Mitral Valve: The mitral valve is normal in structure. Mitral valve regurgitation was not assessed. Tricuspid Valve: The tricuspid valve is structurally normal. There is trace tricuspid regurgitation. Pulmonic Valve: The pulmonic valve is not well visualized. The pulmonic valve regurgitation was not well visualized. Pericardium: There is no pericardial effusion noted. Aorta: The aortic root is normal. Systemic Veins: The inferior vena cava appears mildly dilated, with IVC inspiratory collapse less than 50%. In comparison to the previous  echocardiogram(s): Compared with study dated 5/8/2025, no significant change.  CONCLUSIONS:  1. Poorly visualized anatomical structures due to suboptimal image quality.  2. Left ventricular ejection fraction is normal, calculated by Nazario's biplane at 67%.  3. Left ventricular cavity size is decreased.  4. Right ventricular volume and pressure overload.  5. There is reduced right ventricular systolic function.  6. Severely enlarged right ventricle.  7. The inferior vena cava appears mildly dilated, with IVC inspiratory collapse less than 50%. RECOMMENDATIONS: Utilizing an FDA cleared automated machine learning algorithm (EchoGo Heart Failure by [employer]), the analysis of the apical 4-chamber echocardiogram suggests the presence of heart failure with preserved ejection fraction (HFpEF)*. Clinical correlation looking for additional heart failure signs and symptoms is recommended, as a definite diagnosis of heart failure cannot be made by imaging alone. *Per ACC/AHA/HFSA universal diagnosis of heart failure, HFpEF is defined as 1) signs and symptoms leading to clinical diagnosis of heart failure, 2) an ejection fraction of at least 50%, and 3) evidence of elevated intra-cardiac filling pressures by echocardiography, BNP elevation, or catheterization.  QUANTITATIVE DATA SUMMARY:  2D MEASUREMENTS:          Normal Ranges: LAs:             3.80 cm  (2.7-4.0cm) IVSd:            1.10 cm  (0.6-1.1cm) LVPWd:           0.70 cm  (0.6-1.1cm) LVIDd:           4.40 cm  (3.9-5.9cm) LVIDs:           1.80 cm LV Mass Index:   62 g/m2 LVEDV Index:     62 ml/m2 LV % FS          59.1 %  LEFT ATRIUM:                  Normal Ranges: LA Vol A4C:        53.9 ml    (22+/-6mL/m2) LA Vol A2C:        70.5 ml LA Vol BP:         62.6 ml LA Vol Index A4C:  26.3ml/m2 LA Vol Index A2C:  34.4 ml/m2 LA Vol Index BP:   30.5 ml/m2 LA Area A4C:       19.4 cm2 LA Area A2C:       22.5 cm2 LA Major Axis A4C: 5.9 cm LA Major Axis A2C: 6.1 cm  LV SYSTOLIC  FUNCTION:                      Normal Ranges: EF-A4C View:    69 % (>=55%) EF-A2C View:    68 % EF-Biplane:     67 % LV EF Reported: 67 %  LV DIASTOLIC FUNCTION:             Normal Ranges: MV Peak E:             0.83 m/s    (0.7-1.2 m/s) MV Peak A:             0.73 m/s    (0.42-0.7 m/s) E/A Ratio:             1.14        (1.0-2.2) MV e'                  0.108 m/s   (>8.0) MV lateral e'          0.10 m/s MV medial e'           0.11 m/s MV A Dur:              135.00 msec E/e' Ratio:            7.69        (<8.0) MV DT:                 230 msec    (150-240 msec) PulmV Sys Jeremy:         58.30 cm/s PulmV Jose Jeremy:        56.10 cm/s PulmV S/D Jeremy:         1.00 PulmV A Revs Jeremy:      24.00 cm/s PulmV A Revs Dur:      127.00 msec  MITRAL VALVE:          Normal Ranges: MV DT:        230 msec (150-240msec)  RIGHT VENTRICLE: RV s' 0.11 m/s  TRICUSPID VALVE/RVSP:         Normal Ranges: IVC Diam:             2.12 cm  PULMONIC VALVE:          Normal Ranges: PV Accel Time:  112 msec (>120ms) PV Max Jeremy:     0.9 m/s  (0.6-0.9m/s) PV Max PG:      3.2 mmHg  PULMONARY VEINS: PulmV A Revs Dur: 127.00 msec PulmV A Revs Jeremy: 24.00 cm/s PulmV Jose Jeremy:   56.10 cm/s PulmV S/D Jeremy:    1.00 PulmV Sys Jeremy:    58.30 cm/s  [id number] Smith Abdalla MD Electronically signed on 5/8/2025 at 10:24:04 AM  ** Final **     XR chest 1 view  Result Date: 5/9/2025  Interpreted By:  Osman Granados  and Feliz Quach STUDY: XR CHEST 1 VIEW;  5/9/2025 9:33 am   INDICATION: Signs/Symptoms:hypoxia.   COMPARISON: Chest x-ray 05/07/2025 and chest, abdomen and pelvis CT scan 09/11/2024.   ACCESSION NUMBER(S): [id number]   ORDERING CLINICIAN: SHANNON TERRAZAS   FINDINGS: AP radiograph of the chest was provided.     CARDIOMEDIASTINAL SILHOUETTE: Cardiomediastinal silhouette is stable in size and configuration.   LUNGS: Interval resolution of focal right basilar opacity. Mild left basilar atelectasis with or without small left pleural effusion, similar to prior. No new focal  consolidation. No definite pneumothorax visualized.   ABDOMEN: No remarkable upper abdominal findings.   BONES: No acute osseous changes.       1.  Interval resolution of focal right basilar airspace opacity. 2. Unchanged mild left basilar atelectasis with or without small left pleural effusion.   I personally reviewed the images/study and I agree with the findings as stated by resident physician Main Piper MD. This study was interpreted at [facility], [place], [place].   MACRO: None   Signed by: Osman Granados 5/9/2025 12:28 PM Dictation workstation:   [id number]    Transthoracic Echo (TTE) Limited  Result Date: 5/8/2025   [facility], [address] [place] [address]                Tel [phone] and Fax [phone] TRANSTHORACIC ECHOCARDIOGRAM REPORT  Patient Name:        KIRSTY Saenz Physician: [id number] Smith Abdalla MD Study Date:          5/8/2025             Ordering Provider: [id number] MEJIAS                                                              HOMEIDA MRN/PID:             [id number]             Fellow: Accession#:          [id number]         Nurse: Date of Birth/Age:   1970 / 55 years Sonographer:       Fellow Exam Gender assigned at   F                    Additional Staff: Birth: Height:                                   Admit Date:        5/7/2025 Weight:                                   Admission Status:  Inpatient - Routine BSA / BMI:           m2 / kg/m2           Encounter#:        [id number] Study Type:    TRANSTHORACIC ECHO (TTE) LIMITED Diagnosis/ICD: Hypotension, unspecified-I95.9 Indication:    Hypotension CPT Code:      Echo Limited-48802; Color Doppler-63153  Study Detail: The following Echo studies were performed: 2D, M-Mode and color               flow.  PHYSICIAN INTERPRETATION: Left Ventricle: The left ventricle was not well visualized. The left  ventricular ejection fraction could not be measured. The left ventricular cavity size is decreased. The interventricular septum is flattened in systole and diastole, consistent with right ventricular pressure and volume overload. Left ventricular diastolic filling was not assessed. Left Atrium: The left atrial size was not well visualized. Right Ventricle: The right ventricle is severely enlarged. There is reduced right ventricular systolic function. Right Atrium: The right atrial size was not well visualized. Aortic Valve: The aortic valve was not well visualized. Aortic valve regurgitation was not assessed. Mitral Valve: The mitral valve is normal in structure. There is trace mitral valve regurgitation. Tricuspid Valve: The tricuspid valve was not well visualized. Tricuspid regurgitation was not assessed. Pulmonic Valve: The pulmonic valve was not assessed. Pulmonic valve regurgitation was not assessed. Pericardium: Trivial pericardial effusion. Aorta: The aortic root was not well visualized. Systemic Veins: The inferior vena cava appears mildly dilated, with IVC inspiratory collapse less than 50%. In comparison to the previous echocardiogram(s): Compared with study dated 9/11/2024, Poor technical quality limits comaprison, RV size and function may have worsened since prior exam, IVC is now dilated.  CONCLUSIONS:  1. The left ventricle was not well visualized. The left ventricular ejection fraction could not be measured.  2. Left ventricular diastolic filling was not assessed.  3. Left ventricular cavity size is decreased.  4. Right ventricular volume and pressure overload.  5. There is reduced right ventricular systolic function.  6. Severely enlarged right ventricle.  7. The inferior vena cava appears mildly dilated, with IVC inspiratory collapse less than 50%.  8. Compared with study dated 9/11/2024, Poor technical quality limits comaprison, RV size and function may have worsened since prior exam, IVC is now  dilated. QUANTITATIVE DATA SUMMARY:  LV SYSTOLIC FUNCTION:                      Normal Ranges: LV EF Reported: 63 %  [id number] Smith Abdalla MD Electronically signed on 5/8/2025 at 10:19:31 AM  ** Final **     XR chest 1 view  Result Date: 5/7/2025  Interpreted By:  Momo Oconnell  and Feliz Quach STUDY: XR CHEST 1 VIEW;  5/7/2025 12:49 pm   INDICATION: Signs/Symptoms:Hypoxic and SOB r/o PNA.     COMPARISON: Chest x-ray 03/02/2025   ACCESSION NUMBER(S): [id number]   ORDERING CLINICIAN: PENG REARDON   FINDINGS: AP radiograph of the chest was provided.       CARDIOMEDIASTINAL SILHOUETTE: Cardiomediastinal silhouette is stable in size and configuration.   LUNGS: Slight interval increase in bibasilar opacities, particularly at the right lower lung. Blunting of left costophrenic angle, unchanged from prior. No definite pneumothorax.   ABDOMEN: No remarkable upper abdominal findings.   BONES: No acute osseous changes.       1.  Bibasilar atelectasis with right basilar opacity may represent early developing consolidation versus atelectasis. 2. Small left pleural effusion, similar to prior.   I personally reviewed the images/study and I agree with the findings as stated by resident physician Main Piper MD. This study was interpreted at [facility], [place], [place].   MACRO: None   Signed by: Momo Oconnell 5/7/2025 1:24 PM Dictation workstation:   [id number]    IR CVC nontunneled  Result Date: 4/23/2025  Interpreted By:  Gerry Recio, STUDY: IR CVC NONTUNNELED;  4/18/2025 10:35 am   INDICATION: Signs/Symptoms:History of sickle cell disease on chronic red cell exchange transfusion and need for central venous access.   ,D57.219 Sickle-cell/Hb-C disease with crisis, unspecified (Multi),G89.4 Chronic pain syndrome   COMPARISON: None.   ACCESSION NUMBER(S): [id number]   ORDERING CLINICIAN: MARIYA TRAN   TECHNIQUE: INTERVENTIONALIST(S): Gerry Recio MD   The history and physical exam pertinent to the  "procedure were reviewed and no updates were made.\"   CONSENT: The patient/patient's POA/next of kin was informed of the nature of the proposed procedure. The purposes, alternatives, risks, and benefits were explained and discussed. All questions were answered and consent was obtained.   RADIATION EXPOSURE: Fluoroscopy time: 0.3 min. Dose: 8.0 mGy.     SEDATION: Moderate conscious IV sedation services (supervision of administration, induction, and maintenance) were provided by the physician performing the procedure with intravenous fentanyl 50 mcg and versed 1 mg for 30 minutes. The physician was assisted by an independent trained observer, an interventional radiology nurse, in the continuous monitoring of patient level of consciousness and physiologic status.   MEDICATION/CONTRAST: No additional.   TIME OUT: A time out was performed immediately prior to procedure start with the interventional team, correctly identifying the patient name, date of birth, MRN, procedure, anatomy (including marking of site and side), patient position, procedure consent form, relevant laboratory and imaging test results, antibiotic administration, safety precautions, and procedure-specific equipment needs.   COMPLICATIONS: No immediate adverse events identified.   FINDINGS: Sonographic evaluation of the right groin demonstrated patent right common femoral vein. This was targeted for access. Site prepped and draped in usual sterile fashion. 1% lidocaine for anesthesia. Small skin nick made followed by soft tissue dissection. 21 gauge ultrasound-guided micropuncture access to the right common femoral vein. Ultrasound image saved. Upsized using Seldinger technique to an 035 Amplatz wire followed by serial soft tissue dilatation followed by placement of a 30 cm 13 [place] Trialysis catheter. Catheter secured using non resorbable suture and adhesive dressing. Heparin dwell 1000 units/ml       1.  Uncomplicated and technically successful " placement of an indwelling  right   common femoral vein 30 cm cm 13 [place] Trialysis catheter.   I was present for and/or performed the critical portions of the procedure and immediately available throughout the entire procedure.   I personally reviewed the images/study and I agree with the findings as stated. This study was interpreted at [facility], [place], [place].   MACRO: None   Signed by: Gerry Recio 4/23/2025 12:06 PM Dictation workstation:   [id number]         Assessment/Plan   Introduction to Integrative Medicine:  Spoke with pt at bedside. Patient seemed to appreciate the extra layer of support.   Integrative Medicine was introduced as a service for patients with serious illness to help with symptoms through non-pharmacological management, such as mindfulness, acupuncture, and gentle bodywork. Such interventions can assist with symptoms such as anxiety, fatigue, nausea, depression and pain.     The [facility] Integrative Medicine Symptom Management program offers multi-disciplinary supervised care of cancer patients using Integrative Modalities billed to insurance using NCCN and SIO/ASCO guideline-driven practices.  ESAS is obtained prior to and after each treatment by the practitioner       Pre- Post-   Wellbeing   6  NA - no treatment today   Coping  6     Pain  10 - generalized     Fatigue  6     Anxiety   6     Depression  2     Stress  2     Nausea  4              Sickle cell disease with acute on chronic pain:   pain related to vaso-occlusive crisis  Defer to primary team for adequate PO/IV pain regimen   Recommend integrative therapy modalities as pt allows:  -Acupuncture; provided pt education today, handouts provided. Pt requesting follow up when next available (Tuesday)  -Acupressure, gua sha   -Gentle bodywork and stretching as tolerated     -Art therapy - pt declined   -Music therapy - consult placed   -Chaplaincy - pt declined   -Pet therapy -  pt declined          Nausea/Vomiting:  Intermittent nausea and vomiting related to opioids  -Defer to primary team recs for pharmacological management  -Recommend integrative medicine modalities as listed above        Altered Mood:  Anxiety and/or depression r/t health concerns  History of anxiety/depression  -Recommend integrative medicine modalities as listed above  Mindfulness   Gama: AMDtx, Calm, Headspace, Insight Timer  Guided Imagery  Meditation (15 min in the morning) - consider mindfulness (Mindfulness based Stress Reduction)   Apps such as CALM or Headspace  Deep breathing: Alternate nostril breathing and Deep abdominal breathing (5 min) in the morning  [facility] - Guided Meditation       Thank you for allowing us to participate in the care of this patient. Integrative Medicine Team will continue to follow as needed.  Please contact team with any questions or concerns.           GRISEL Palacios-CNP (available by Wedia)  [facility]  Inpatient Integrative Medicine      I spent 45 minutes in the care of this patient which included chart review, interviewing patient/family, discussion with primary team, coordination of care, and documentation.     Medical Decision Making was high level due to high complexity of problems, extensive data review, and high risk of management/treatment.                    [1]   Family History  Problem Relation Name Age of Onset    Diabetes Father      Gout Father      Sickle cell trait Father      Seizures Sister      Diabetes Other      Uterine cancer Other      Other (congenital blindness) Cousin     [2] ascorbic acid, 500 mg, oral, Daily  atropine, 1 drop, Left Eye, Nightly  cefTRIAXone, 2 g, intravenous, q24h  cetirizine, 10 mg, oral, Daily  folic acid, 1 mg, oral, Daily  furosemide, 20 mg, oral, Every other day  guaiFENesin, 600 mg, oral, BID  lidocaine, 1 patch, transdermal, Daily  metoprolol tartrate, 25 mg, oral, BID  oxygen, ,  inhalation, Continuous - Inhalation  sennosides-docusate sodium, 1 tablet, oral, BID  warfarin, 5 mg, oral, Once per day on Sunday Monday Wednesday Thursday Friday Saturday  [START ON 5/13/2025] warfarin, 7.5 mg, oral, Every Tuesday  [3] heparin, 0-4,500 Units/hr, Last Rate: 1,100 Units/hr (05/11/25 1046)  [4] PRN medications: albuterol, alteplase, benzocaine-menthol, cyclobenzaprine, diphenhydrAMINE, fluticasone, heparin, HYDROmorphone, ondansetron, oxyCODONE, oxyCODONE, oxygen, phenoL, sodium chloride

## 2025-05-12 NOTE — PROGRESS NOTES
Spiritual Care Visit  Spiritual Care Request    Reason for Visit:  Emotional support             Care Provided:    Patient reports nausea and fatigue.   Provide presence and prayer in keeping with patient Islam Anglican komal tradition.  Listen.       Sense of Community and or Gnosticist Affiliation:  Islam

## 2025-05-12 NOTE — PROGRESS NOTES
Acupuncture Visit:     Senait Narvaez was referred by Dora Hopper CNP  .  Session Information  Discipline: Acupuncture  Session Start Time: 0245  Session End Time: 0250  Visit Type: New patient  Conflict of Service : Declined service  Medical History Reviewed: I have reviewed pertinent medical history in EHR, and no contraindications are present to provide treatment  Description of Present Complaint: Wellbeing challenges, Chronic pain  History of Present Illness: Seeking support for wellbeing challenge  Additional Assessment Detail: Patient met at bedside with guest visiting.  She was in a waning state of sleep and wakefulness but able to participate in IO program education.  She received program flyer detailing availability of service line but in and out of her [facility] admission.  She verbally expressed interest in receiving IO intervention during next availability of service line.    Pre-treatment Assessment  Unable to Assess Reason: Patient declined to answer, Cognitive limitation    Pain Assessment  Pain Type: Acute pain  Pain Location: Generalized  Pain Interventions: Medication (See MAR)         Provider reviewed plan for the acupuncture session, precautions and contraindications. Patient/guardian/hospital staff has given consent to treat with full understanding of what to expect during thesession. Before acupuncture began, provider explained to the patient to communicate at any time if the procedure was causing discomfort past their tolerance level. Patient agreed to advise acupuncturist. The acupuncturist counseled the patient on the risks of acupuncture treatment including pain, infection, bleeding, and no relief of pain. The patient was positioned comfortably. There was no evidence of infection at the site of needle insertions.       No annotated images are attached to the encounter.         Post-treatment Assessment  Unable to Assess Reason: Did not provide intervention, Cognitive limitation, Patient  sleeping, Patient declined to answer  0-10 (Numeric) Pain Score: 8    Evaluation/Recommendation/Follow-up  Total Session Time (min): 5 minutes      Massage Therapy / Acupuncture Note:

## 2025-05-13 ENCOUNTER — APPOINTMENT (OUTPATIENT)
Dept: VASCULAR MEDICINE | Facility: HOSPITAL | Age: 55
DRG: 871 | End: 2025-05-13
Payer: COMMERCIAL

## 2025-05-13 LAB
ALBUMIN SERPL BCP-MCNC: 3.4 G/DL (ref 3.4–5)
ALP SERPL-CCNC: 113 U/L (ref 33–110)
ALT SERPL W P-5'-P-CCNC: 9 U/L (ref 7–45)
ANION GAP BLDV CALCULATED.4IONS-SCNC: 5 MMOL/L (ref 10–25)
ANION GAP BLDV CALCULATED.4IONS-SCNC: 5 MMOL/L (ref 10–25)
ANION GAP SERPL CALC-SCNC: 10 MMOL/L (ref 10–20)
AST SERPL W P-5'-P-CCNC: 14 U/L (ref 9–39)
BASE EXCESS BLDV CALC-SCNC: 8.6 MMOL/L (ref -2–3)
BASE EXCESS BLDV CALC-SCNC: 9.1 MMOL/L (ref -2–3)
BASOPHILS # BLD AUTO: 0.03 X10*3/UL (ref 0–0.1)
BASOPHILS NFR BLD AUTO: 0.3 %
BILIRUB SERPL-MCNC: 0.6 MG/DL (ref 0–1.2)
BODY TEMPERATURE: 37 DEGREES CELSIUS
BODY TEMPERATURE: 37 DEGREES CELSIUS
BUN SERPL-MCNC: 16 MG/DL (ref 6–23)
CA-I BLDV-SCNC: 1.2 MMOL/L (ref 1.1–1.33)
CA-I BLDV-SCNC: 1.23 MMOL/L (ref 1.1–1.33)
CALCIUM SERPL-MCNC: 8.7 MG/DL (ref 8.6–10.6)
CHLORIDE BLDV-SCNC: 98 MMOL/L (ref 98–107)
CHLORIDE BLDV-SCNC: 99 MMOL/L (ref 98–107)
CHLORIDE SERPL-SCNC: 96 MMOL/L (ref 98–107)
CO2 SERPL-SCNC: 36 MMOL/L (ref 21–32)
CREAT SERPL-MCNC: 1.32 MG/DL (ref 0.5–1.05)
EGFRCR SERPLBLD CKD-EPI 2021: 47 ML/MIN/1.73M*2
EOSINOPHIL # BLD AUTO: 0.51 X10*3/UL (ref 0–0.7)
EOSINOPHIL NFR BLD AUTO: 5.3 %
ERYTHROCYTE [DISTWIDTH] IN BLOOD BY AUTOMATED COUNT: 23.9 % (ref 11.5–14.5)
GLUCOSE BLDV-MCNC: 103 MG/DL (ref 74–99)
GLUCOSE BLDV-MCNC: 97 MG/DL (ref 74–99)
GLUCOSE SERPL-MCNC: 96 MG/DL (ref 74–99)
HCO3 BLDV-SCNC: 36.1 MMOL/L (ref 22–26)
HCO3 BLDV-SCNC: 36.2 MMOL/L (ref 22–26)
HCT VFR BLD AUTO: 27.8 % (ref 36–46)
HCT VFR BLD EST: 19 % (ref 36–46)
HCT VFR BLD EST: 19 % (ref 36–46)
HEMOGLOBIN A2: 3.1 % (ref 2–3.5)
HEMOGLOBIN A: 50.3 % (ref 95.8–98)
HEMOGLOBIN C: 22.9 %
HEMOGLOBIN F: 0.3 % (ref 0–2)
HEMOGLOBIN IDENTIFICATION INTERPRETATION: ABNORMAL
HEMOGLOBIN S: 23.4 %
HGB BLD-MCNC: 8.5 G/DL (ref 12–16)
HGB BLDV-MCNC: 6.2 G/DL (ref 12–16)
HGB BLDV-MCNC: 6.4 G/DL (ref 12–16)
HGB RETIC QN: 24 PG (ref 28–38)
HYPOCHROMIA BLD QL SMEAR: NORMAL
IMM GRANULOCYTES # BLD AUTO: 0.05 X10*3/UL (ref 0–0.7)
IMM GRANULOCYTES NFR BLD AUTO: 0.5 % (ref 0–0.9)
IMMATURE RETIC FRACTION: 24.1 %
INHALED O2 CONCENTRATION: 28 %
INHALED O2 CONCENTRATION: 28 %
INR PPP: 2 (ref 0.9–1.1)
LACTATE BLDV-SCNC: 0.9 MMOL/L (ref 0.4–2)
LACTATE BLDV-SCNC: 0.9 MMOL/L (ref 0.4–2)
LDH SERPL L TO P-CCNC: 206 U/L (ref 84–246)
LYMPHOCYTES # BLD AUTO: 1.68 X10*3/UL (ref 1.2–4.8)
LYMPHOCYTES NFR BLD AUTO: 17.3 %
MAGNESIUM SERPL-MCNC: 2.3 MG/DL (ref 1.6–2.4)
MCH RBC QN AUTO: 27.8 PG (ref 26–34)
MCHC RBC AUTO-ENTMCNC: 30.6 G/DL (ref 32–36)
MCV RBC AUTO: 91 FL (ref 80–100)
MONOCYTES # BLD AUTO: 0.8 X10*3/UL (ref 0.1–1)
MONOCYTES NFR BLD AUTO: 8.3 %
NEUTROPHILS # BLD AUTO: 6.62 X10*3/UL (ref 1.2–7.7)
NEUTROPHILS NFR BLD AUTO: 68.3 %
NRBC BLD-RTO: 28.2 /100 WBCS (ref 0–0)
OXYHGB MFR BLDV: 59.4 % (ref 45–75)
OXYHGB MFR BLDV: 86.2 % (ref 45–75)
PATH REVIEW-HGB IDENTIFICATION: ABNORMAL
PCO2 BLDV: 70 MM HG (ref 41–51)
PCO2 BLDV: 77 MM HG (ref 41–51)
PH BLDV: 7.28 PH (ref 7.33–7.43)
PH BLDV: 7.32 PH (ref 7.33–7.43)
PLATELET # BLD AUTO: 239 X10*3/UL (ref 150–450)
PO2 BLDV: 35 MM HG (ref 35–45)
PO2 BLDV: 54 MM HG (ref 35–45)
POLYCHROMASIA BLD QL SMEAR: NORMAL
POTASSIUM BLDV-SCNC: 4.7 MMOL/L (ref 3.5–5.3)
POTASSIUM BLDV-SCNC: 4.7 MMOL/L (ref 3.5–5.3)
POTASSIUM SERPL-SCNC: 4.7 MMOL/L (ref 3.5–5.3)
PROT SERPL-MCNC: 7 G/DL (ref 6.4–8.2)
PROTHROMBIN TIME: 22.2 SECONDS (ref 9.8–12.4)
RBC # BLD AUTO: 3.06 X10*6/UL (ref 4–5.2)
RBC MORPH BLD: NORMAL
RETICS #: 0.24 X10*6/UL (ref 0.02–0.08)
RETICS/RBC NFR AUTO: 7.7 % (ref 0.5–2)
SAO2 % BLDV: 61 % (ref 45–75)
SAO2 % BLDV: 88 % (ref 45–75)
SODIUM BLDV-SCNC: 134 MMOL/L (ref 136–145)
SODIUM BLDV-SCNC: 135 MMOL/L (ref 136–145)
SODIUM SERPL-SCNC: 137 MMOL/L (ref 136–145)
TARGETS BLD QL SMEAR: NORMAL
UFH PPP CHRO-ACNC: 0.4 IU/ML (ref ?–1.1)
WBC # BLD AUTO: 9.7 X10*3/UL (ref 4.4–11.3)

## 2025-05-13 PROCEDURE — 2500000004 HC RX 250 GENERAL PHARMACY W/ HCPCS (ALT 636 FOR OP/ED)

## 2025-05-13 PROCEDURE — 85610 PROTHROMBIN TIME: CPT

## 2025-05-13 PROCEDURE — 2500000001 HC RX 250 WO HCPCS SELF ADMINISTERED DRUGS (ALT 637 FOR MEDICARE OP)

## 2025-05-13 PROCEDURE — 2500000004 HC RX 250 GENERAL PHARMACY W/ HCPCS (ALT 636 FOR OP/ED): Mod: JZ

## 2025-05-13 PROCEDURE — 85025 COMPLETE CBC W/AUTO DIFF WBC: CPT

## 2025-05-13 PROCEDURE — 2500000004 HC RX 250 GENERAL PHARMACY W/ HCPCS (ALT 636 FOR OP/ED): Mod: JZ,TB

## 2025-05-13 PROCEDURE — 36415 COLL VENOUS BLD VENIPUNCTURE: CPT

## 2025-05-13 PROCEDURE — 80053 COMPREHEN METABOLIC PANEL: CPT

## 2025-05-13 PROCEDURE — 93970 EXTREMITY STUDY: CPT

## 2025-05-13 PROCEDURE — 82435 ASSAY OF BLOOD CHLORIDE: CPT

## 2025-05-13 PROCEDURE — 82805 BLOOD GASES W/O2 SATURATION: CPT

## 2025-05-13 PROCEDURE — 93970 EXTREMITY STUDY: CPT | Performed by: SURGERY

## 2025-05-13 PROCEDURE — 99233 SBSQ HOSP IP/OBS HIGH 50: CPT

## 2025-05-13 PROCEDURE — 2500000002 HC RX 250 W HCPCS SELF ADMINISTERED DRUGS (ALT 637 FOR MEDICARE OP, ALT 636 FOR OP/ED)

## 2025-05-13 PROCEDURE — 83735 ASSAY OF MAGNESIUM: CPT

## 2025-05-13 PROCEDURE — 2500000005 HC RX 250 GENERAL PHARMACY W/O HCPCS

## 2025-05-13 PROCEDURE — 85520 HEPARIN ASSAY: CPT

## 2025-05-13 PROCEDURE — 85045 AUTOMATED RETICULOCYTE COUNT: CPT

## 2025-05-13 PROCEDURE — 83615 LACTATE (LD) (LDH) ENZYME: CPT

## 2025-05-13 PROCEDURE — 1200000003 HC ONCOLOGY  ROOM WITH TELEMETRY DAILY

## 2025-05-13 RX ORDER — OXYCODONE HYDROCHLORIDE 10 MG/1
10 TABLET ORAL EVERY 4 HOURS PRN
Refills: 0 | Status: DISCONTINUED | OUTPATIENT
Start: 2025-05-13 | End: 2025-05-15 | Stop reason: HOSPADM

## 2025-05-13 RX ORDER — HYDROMORPHONE HYDROCHLORIDE 1 MG/ML
1 INJECTION, SOLUTION INTRAMUSCULAR; INTRAVENOUS; SUBCUTANEOUS EVERY 4 HOURS PRN
Status: DISCONTINUED | OUTPATIENT
Start: 2025-05-13 | End: 2025-05-15 | Stop reason: HOSPADM

## 2025-05-13 RX ADMIN — CEFTRIAXONE SODIUM 2 G: 2 INJECTION, SOLUTION INTRAVENOUS at 20:21

## 2025-05-13 RX ADMIN — SENNOSIDES AND DOCUSATE SODIUM 1 TABLET: 50; 8.6 TABLET ORAL at 20:21

## 2025-05-13 RX ADMIN — HYDROMORPHONE HYDROCHLORIDE 1 MG: 1 INJECTION, SOLUTION INTRAMUSCULAR; INTRAVENOUS; SUBCUTANEOUS at 21:49

## 2025-05-13 RX ADMIN — Medication 2 L/MIN: at 20:28

## 2025-05-13 RX ADMIN — BENZOCAINE AND MENTHOL 1 LOZENGE: 15; 3.6 LOZENGE ORAL at 13:56

## 2025-05-13 RX ADMIN — OXYCODONE HYDROCHLORIDE 10 MG: 10 TABLET ORAL at 15:46

## 2025-05-13 RX ADMIN — OXYCODONE HYDROCHLORIDE 10 MG: 10 TABLET ORAL at 20:20

## 2025-05-13 RX ADMIN — HYDROMORPHONE HYDROCHLORIDE 1 MG: 1 INJECTION, SOLUTION INTRAMUSCULAR; INTRAVENOUS; SUBCUTANEOUS at 17:49

## 2025-05-13 RX ADMIN — HYDROMORPHONE HYDROCHLORIDE 1 MG: 1 INJECTION, SOLUTION INTRAMUSCULAR; INTRAVENOUS; SUBCUTANEOUS at 06:10

## 2025-05-13 RX ADMIN — OXYCODONE HYDROCHLORIDE 10 MG: 10 TABLET ORAL at 04:27

## 2025-05-13 RX ADMIN — GUAIFENESIN 600 MG: 600 TABLET, EXTENDED RELEASE ORAL at 10:33

## 2025-05-13 RX ADMIN — FOLIC ACID 1 MG: 1 TABLET ORAL at 10:33

## 2025-05-13 RX ADMIN — METOPROLOL TARTRATE 25 MG: 25 TABLET, FILM COATED ORAL at 10:33

## 2025-05-13 RX ADMIN — CETIRIZINE HYDROCHLORIDE 10 MG: 10 TABLET, FILM COATED ORAL at 10:33

## 2025-05-13 RX ADMIN — DIPHENHYDRAMINE HYDROCHLORIDE 25 MG: 25 CAPSULE ORAL at 02:33

## 2025-05-13 RX ADMIN — GUAIFENESIN 600 MG: 600 TABLET, EXTENDED RELEASE ORAL at 20:21

## 2025-05-13 RX ADMIN — WARFARIN SODIUM 7.5 MG: 7.5 TABLET ORAL at 17:49

## 2025-05-13 RX ADMIN — HYDROMORPHONE HYDROCHLORIDE 1 MG: 1 INJECTION, SOLUTION INTRAMUSCULAR; INTRAVENOUS; SUBCUTANEOUS at 01:50

## 2025-05-13 RX ADMIN — Medication 2 L/MIN: at 10:36

## 2025-05-13 RX ADMIN — ATROPINE SULFATE 1 DROP: 10 SOLUTION/ DROPS OPHTHALMIC at 20:21

## 2025-05-13 RX ADMIN — ONDANSETRON HYDROCHLORIDE 4 MG: 4 TABLET, FILM COATED ORAL at 10:33

## 2025-05-13 RX ADMIN — HEPARIN SODIUM 1100 UNITS/HR: 10000 INJECTION, SOLUTION INTRAVENOUS at 13:57

## 2025-05-13 RX ADMIN — OXYCODONE HYDROCHLORIDE AND ACETAMINOPHEN 500 MG: 500 TABLET ORAL at 10:33

## 2025-05-13 RX ADMIN — CYCLOBENZAPRINE 10 MG: 10 TABLET, FILM COATED ORAL at 10:33

## 2025-05-13 RX ADMIN — HYDROMORPHONE HYDROCHLORIDE 1 MG: 1 INJECTION, SOLUTION INTRAMUSCULAR; INTRAVENOUS; SUBCUTANEOUS at 12:36

## 2025-05-13 RX ADMIN — OXYCODONE HYDROCHLORIDE 10 MG: 10 TABLET ORAL at 10:33

## 2025-05-13 RX ADMIN — Medication 1 SPRAY: at 13:56

## 2025-05-13 RX ADMIN — METOPROLOL TARTRATE 25 MG: 25 TABLET, FILM COATED ORAL at 20:21

## 2025-05-13 ASSESSMENT — PAIN - FUNCTIONAL ASSESSMENT
PAIN_FUNCTIONAL_ASSESSMENT: 0-10
PAIN_FUNCTIONAL_ASSESSMENT: UNABLE TO SELF-REPORT
PAIN_FUNCTIONAL_ASSESSMENT: 0-10
PAIN_FUNCTIONAL_ASSESSMENT: UNABLE TO SELF-REPORT
PAIN_FUNCTIONAL_ASSESSMENT: 0-10

## 2025-05-13 ASSESSMENT — PAIN SCALES - GENERAL
PAINLEVEL_OUTOF10: 8
PAINLEVEL_OUTOF10: 9
PAINLEVEL_OUTOF10: 8
PAINLEVEL_OUTOF10: 8
PAINLEVEL_OUTOF10: 6
PAINLEVEL_OUTOF10: 8
PAINLEVEL_OUTOF10: 8
PAINLEVEL_OUTOF10: 6
PAINLEVEL_OUTOF10: 8
PAINLEVEL_OUTOF10: 8
PAINLEVEL_OUTOF10: 7
PAINLEVEL_OUTOF10: 7
PAINLEVEL_OUTOF10: 8
PAINLEVEL_OUTOF10: 8
PAINLEVEL_OUTOF10: 7
PAINLEVEL_OUTOF10: 8

## 2025-05-13 NOTE — CARE PLAN
(P) Pain control, patient will remain safe throughout shift 5/13/25 @ 1900      Problem: Pain - Adult  Goal: Verbalizes/displays adequate comfort level or baseline comfort level  Outcome: Progressing     Problem: Safety - Adult  Goal: Free from fall injury  Outcome: Progressing     Problem: Discharge Planning  Goal: Discharge to home or other facility with appropriate resources  Outcome: Progressing     Problem: Chronic Conditions and Co-morbidities  Goal: Patient's chronic conditions and co-morbidity symptoms are monitored and maintained or improved  Outcome: Progressing     Problem: Nutrition  Goal: Nutrient intake appropriate for maintaining nutritional needs  Outcome: Progressing     Problem: Fall/Injury  Goal: Not fall by end of shift  Outcome: Progressing  Goal: Be free from injury by end of the shift  Outcome: Progressing  Goal: Verbalize understanding of personal risk factors for fall in the hospital  Outcome: Progressing  Goal: Verbalize understanding of risk factor reduction measures to prevent injury from fall in the home  Outcome: Progressing  Goal: Use assistive devices by end of the shift  Outcome: Progressing  Goal: Pace activities to prevent fatigue by end of the shift  Outcome: Progressing     Problem: Pain  Goal: Takes deep breaths with improved pain control throughout the shift  Outcome: Progressing  Goal: Turns in bed with improved pain control throughout the shift  Outcome: Progressing  Goal: Walks with improved pain control throughout the shift  Outcome: Progressing  Goal: Performs ADL's with improved pain control throughout shift  Outcome: Progressing  Goal: Participates in PT with improved pain control throughout the shift  Outcome: Progressing  Goal: Free from opioid side effects throughout the shift  Outcome: Progressing  Goal: Free from acute confusion related to pain meds throughout the shift  Outcome: Progressing     Problem: Skin  Goal: Participates in plan/prevention/treatment  measures  Outcome: Progressing  Goal: Prevent/manage excess moisture  Outcome: Progressing  Goal: Prevent/minimize sheer/friction injuries  Outcome: Progressing  Goal: Promote/optimize nutrition  Outcome: Progressing  Goal: Promote skin healing  Outcome: Progressing

## 2025-05-13 NOTE — PROGRESS NOTES
Acupuncture Visit:     Senait Narvaez was referred by Dora Hopper  .  Session Information  Discipline: Acupuncture  Session Start Time: 1000  Session End Time: 1015  Visit Type: New patient  Medical History Reviewed: I have reviewed pertinent medical history in EHR, and no contraindications are present to provide treatment  History of Present Illness: Seeking support for wellbeing  Additional Assessment Detail: Pt met at bedside.  Time spent educating patient on possible therapeutic IO interventions and availability of service line.  Declined at time of interaction as she wanted more time to think about it         Pain Assessment  Pain Type: Acute pain  Pain Location: Generalized  Clinical Progression: Gradually improving  Pain Interventions: Medication (See MAR)         Provider reviewed plan for the acupuncture session, precautions and contraindications. Patient/guardian/hospital staff has given consent to treat with full understanding of what to expect during thesession. Before acupuncture began, provider explained to the patient to communicate at any time if the procedure was causing discomfort past their tolerance level. Patient agreed to advise acupuncturist. The acupuncturist counseled the patient on the risks of acupuncture treatment including pain, infection, bleeding, and no relief of pain. The patient was positioned comfortably. There was no evidence of infection at the site of needle insertions.       No annotated images are attached to the encounter.         Post-treatment Assessment  0-10 (Numeric) Pain Score: 8    Evaluation/Recommendation/Follow-up  Total Session Time (min): 15 minutes      Massage Therapy / Acupuncture Note:

## 2025-05-13 NOTE — PROGRESS NOTES
Senait Narvaez is a 55 y.o. female on day 6 of admission presenting with Pneumonia due to infectious organism, unspecified laterality, unspecified part of lung.    Subjective   Patient seen at bedside. States pain continues in back and shoulders, rating pain 6-7/10 this morning. Discussed rotating pain medications today, patient agreeable. Discussed results of US. Discussed utilizing CPAP tonight, patient agreeable. Still endorsing productive cough and mild sore throat, slightly improved since admission. Encouraged PRN supportive medications. She is currently on 2L O2. Denies any N/V/D/C, fever/chills, SOB, CP or heart palpitations.      Objective     Physical Exam  Vitals reviewed.   Constitutional:       Appearance: Normal appearance. She is obese.   HENT:      Head: Normocephalic and atraumatic.      Nose: Nose normal.      Mouth/Throat:      Mouth: Mucous membranes are moist.      Pharynx: Oropharynx is clear.   Eyes:      Extraocular Movements: Extraocular movements intact.      Pupils: Pupils are equal, round, and reactive to light.   Cardiovascular:      Rate and Rhythm: Normal rate and regular rhythm.      Pulses: Normal pulses.      Heart sounds: Normal heart sounds.   Pulmonary:      Effort: Pulmonary effort is normal.      Breath sounds: Normal breath sounds.      Comments: 2L NC   Abdominal:      General: Bowel sounds are normal.      Palpations: Abdomen is soft.   Musculoskeletal:         General: Normal range of motion.      Right lower leg: Edema present.   Skin:     General: Skin is warm.      Capillary Refill: Capillary refill takes less than 2 seconds.   Neurological:      General: No focal deficit present.      Mental Status: She is alert and oriented to person, place, and time. Mental status is at baseline.   Psychiatric:         Mood and Affect: Mood normal.         Behavior: Behavior normal.         Last Recorded Vitals  Blood pressure 102/68, pulse 62, temperature 36.3 °C (97.3 °F), temperature  "source Temporal, resp. rate 18, height 1.651 m (5' 5\"), weight 107 kg (235 lb 0.2 oz), SpO2 97%.  Intake/Output last 3 Shifts:  I/O last 3 completed shifts:  In: 705 (6.6 mL/kg) [P.O.:550; I.V.:55 (0.5 mL/kg); IV Piggyback:100]  Out: - (0 mL/kg)   Weight: 106.6 kg     Relevant Results  Scheduled medications  Scheduled Medications[1]  Continuous medications  Continuous Medications[2]  PRN medications  PRN Medications[3]   Assessment & Plan  Pneumonia due to infectious organism, unspecified laterality, unspecified part of lung    Senait Narvaez is a 55 y.o. 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 On arrival found to be hypoxic to 82% on RA, placed on 2L O2 via NC. Patient admitted for pain control. CXR on admit showing consolidation vs atelectasis. Started on Azithromycin (5/8-5/10) and Ceftriaxone (5/8-end date 5/13). Later that evening, transferred to the MICU 5/8-5/9 for hypotension and hypercanpic hypoxic respiratory failure that inproved with BIPAP, s/p exchange transfusion 5/8, and transferred back to [place] on 5/9. She remains inpatient while bridging back to warfarin and managing pain, INR still not at goal and still requiring IV opioids. RLE with mild swelling, BLE duplex negative for DVT 5/13. Discharge pending therapeutic INR and improvement in pain.     Updates 5/12:   - INR 2.0 today (goal 2-3), spoke with pharmacy; hep gtt to continue today and can plan to be discontinued tomorrow evening pending INR level on 5/14   - BLE duplex negative for DVT with incidental finding of enlarged lymph node measuring 5.55 mm x 7.95 mm  5/13   - " Continue CTX, end date 5/13 for ACS/ CAP   - VBG improved compared to MICU   - Started rotating 10mg PO oxycodone Q4hrs for severe pain and 1mg IVP dilaudid Q4hrs for breakthrough pain     # acute hypercapnic hypoxic respiratory failure - improved   # acute chest syndrome  # CAP  # pulmonary HTN  - on admission hypoxic to 82% on RA, placed on 2L O2 via NC  - CXR on admit (5/8) showing consolidation vs atelectasis  - transferred to the MICU 5/8-5/9 for hypotension and hypercanpic hypoxic respiratory failure that inproved with BIPAP  - lactate WNL   - VBG showed PH:7.15->7.20->7.23-> 7.32 (5/13), Co2 of 86->75->71 with bicarb of 28, respiratory acidosis   - Hgb S 22.6% (5/8)   - s/p exchange transfusion 5/8  - Repeat Hgb S 9.8% (5/9)   - Azithromycin (5/8-5/10) and Ceftriaxone (5/8-end date 5/13)  - VBG 5/13 improved   - Was previous on 4L, weaned to RA 5/12 with O2/ CPAP at night   - pt should be put on CPAP nightly and whenever she takes nap with setting of 16/8 (patient has been refusing)   - cont pulse ox   - apheresis line remains in place for hep gtt and labs; will need removed prior to DC; consider removal 5/14 if heparin gtt can be discontinued     # Hb SC disease   # Acute on chronic SCD related pain crisis  - currently on q6 week RBC exchange transfusions (most recent outpatient 4/18 and inpatient 5/8)  - [facility] reviewed, last updated 4/16/25  - OARRS reviewed, no aberrant behavior noted   - Apheresis line in place from exchange (kept in place for labs and hep gtt)   - Continue oxycodone 5mg PRN for moderate pain, and 10mg PRN for severe pain.  dilaudid 1 mg IV q4h PRN for breakthrough pain (was not rotated appropriately since admission)   - Started rotating 10mg PO oxycodone Q4hrs for severe pain and 1mg IVP dilaudid Q4hrs for breakthrough pain (5/13- )   - Cyclobenzaprine 10 mg every 8 hours prn   - Bowel regimen for opioid induced constipation with Senna 2tabs BID and Miralax daily, PO Benadryl 25 mg  q6h PRN for opioid-induced pruritus, PO Zofran 8 mg q8h PRN for opioid-induced nausea  - c/w home folic acid 1 mg daily  - Utox: + cannabinoid (3/4)   - IS encouraged      # CHARLES-improving   # hyperkalemia - resolved  - Etiology likely pre-renal secondary to exchange, no new medications initiated this admission   - Baseline Cr ~1.0, 1.32 (5/11) --> 1.24 (5/12)   - s/p lokelma 10g q8  - urine electrolytes (5/9); FeNA 0.1% most likely indicating pre-renal   - PO hydration encouraged, held off on IV fluids as pt is slightly volume overloaded   - avoid nephrotoxins  - nephrology consulted recs appreciated while in MICU, no nidication for dialysis     # hx of DVT/PE  # hx of SVC syndrome  # Mild RLE swelling   - On warfarin at home, INR 1.5 on admission  - heparin gtt  - Warfarin restarted 5/9 will need to bridge with heparin gtt   - INR 2.0 today (goal 2-3), spoke with pharmacy; hep gtt to continue 5/13 and can plan to be discontinued tomorrow evening pending INR level on 5/14   - BLE duplex negative for DVT 5/13 given mild RLE edema     # HFpEF  # pHTN  # troponinemia   - Last TTE 5/9/25: HFpEF with EF of 67% right ventricular overloaded , severely enlarged RV   - troponin leak likely iso demand  - on lasix 20 every other day at home  - trop 320 ->310      # hx of SVT  - Metoprolol tartrate 25 mg BID  - tele    # Prophy   - Hep gtt, OOB as able, SCDs while in bed      Dispo   - Discharge after warfarin bridge and improvement in CHARLES and pain   - FUV ophtho 5/21, requested closer FUV with Dr. Whaley, cards 8/25  - Emergency contact Damian Narvaez [phone]      I spent 60 minutes in the professional and overall care of this patient.    Assessment and plan as above discussed with attending physician Dr. Shellie Kerns, APRN-CNP         [1] ascorbic acid, 500 mg, oral, Daily  atropine, 1 drop, Left Eye, Nightly  cefTRIAXone, 2 g, intravenous, q24h  cetirizine, 10 mg, oral, Daily  folic acid, 1 mg,  oral, Daily  furosemide, 20 mg, oral, Every other day  guaiFENesin, 600 mg, oral, BID  lidocaine, 1 patch, transdermal, Daily  metoprolol tartrate, 25 mg, oral, BID  oxygen, , inhalation, Continuous - Inhalation  sennosides-docusate sodium, 1 tablet, oral, BID  warfarin, 5 mg, oral, Once per day on Sunday Monday Wednesday Thursday Friday Saturday  warfarin, 7.5 mg, oral, Every Tuesday     [2] heparin, 0-4,500 Units/hr, Last Rate: 1,100 Units/hr (05/12/25 1526)     [3] PRN medications: albuterol, alteplase, benzocaine-menthol, cyclobenzaprine, diphenhydrAMINE, fluticasone, heparin, HYDROmorphone, ondansetron, oxyCODONE, oxygen, phenoL, sodium chloride

## 2025-05-13 NOTE — CARE PLAN
Pain/nausea control      Problem: Pain - Adult  Goal: Verbalizes/displays adequate comfort level or baseline comfort level  Outcome: Progressing     Problem: Safety - Adult  Goal: Free from fall injury  Outcome: Progressing     Problem: Discharge Planning  Goal: Discharge to home or other facility with appropriate resources  Outcome: Progressing     Problem: Chronic Conditions and Co-morbidities  Goal: Patient's chronic conditions and co-morbidity symptoms are monitored and maintained or improved  Outcome: Progressing     Problem: Nutrition  Goal: Nutrient intake appropriate for maintaining nutritional needs  Outcome: Progressing     Problem: Fall/Injury  Goal: Not fall by end of shift  Outcome: Progressing  Goal: Be free from injury by end of the shift  Outcome: Progressing  Goal: Verbalize understanding of personal risk factors for fall in the hospital  Outcome: Progressing  Goal: Verbalize understanding of risk factor reduction measures to prevent injury from fall in the home  Outcome: Progressing  Goal: Use assistive devices by end of the shift  Outcome: Progressing  Goal: Pace activities to prevent fatigue by end of the shift  Outcome: Progressing     Problem: Pain  Goal: Takes deep breaths with improved pain control throughout the shift  Outcome: Progressing  Goal: Turns in bed with improved pain control throughout the shift  Outcome: Progressing  Goal: Walks with improved pain control throughout the shift  Outcome: Progressing  Goal: Performs ADL's with improved pain control throughout shift  Outcome: Progressing  Goal: Participates in PT with improved pain control throughout the shift  Outcome: Progressing  Goal: Free from opioid side effects throughout the shift  Outcome: Progressing  Goal: Free from acute confusion related to pain meds throughout the shift  Outcome: Progressing     Problem: Skin  Goal: Participates in plan/prevention/treatment measures  Outcome: Progressing  Goal: Prevent/manage excess  moisture  Outcome: Progressing  Goal: Prevent/minimize sheer/friction injuries  Outcome: Progressing  Goal: Promote/optimize nutrition  Outcome: Progressing  Goal: Promote skin healing  Outcome: Progressing

## 2025-05-14 LAB
ALBUMIN SERPL BCP-MCNC: 3.7 G/DL (ref 3.4–5)
ALP SERPL-CCNC: 116 U/L (ref 33–110)
ALT SERPL W P-5'-P-CCNC: 7 U/L (ref 7–45)
ANION GAP SERPL CALC-SCNC: 10 MMOL/L (ref 10–20)
AST SERPL W P-5'-P-CCNC: 12 U/L (ref 9–39)
BASOPHILS # BLD AUTO: 0.02 X10*3/UL (ref 0–0.1)
BASOPHILS NFR BLD AUTO: 0.2 %
BILIRUB SERPL-MCNC: 0.6 MG/DL (ref 0–1.2)
BUN SERPL-MCNC: 14 MG/DL (ref 6–23)
CALCIUM SERPL-MCNC: 8.9 MG/DL (ref 8.6–10.6)
CHLORIDE SERPL-SCNC: 96 MMOL/L (ref 98–107)
CO2 SERPL-SCNC: 36 MMOL/L (ref 21–32)
CREAT SERPL-MCNC: 1.17 MG/DL (ref 0.5–1.05)
EGFRCR SERPLBLD CKD-EPI 2021: 55 ML/MIN/1.73M*2
EOSINOPHIL # BLD AUTO: 0.46 X10*3/UL (ref 0–0.7)
EOSINOPHIL NFR BLD AUTO: 4.8 %
ERYTHROCYTE [DISTWIDTH] IN BLOOD BY AUTOMATED COUNT: 23.8 % (ref 11.5–14.5)
GLUCOSE SERPL-MCNC: 98 MG/DL (ref 74–99)
HCT VFR BLD AUTO: 29.6 % (ref 36–46)
HGB BLD-MCNC: 8.7 G/DL (ref 12–16)
HGB RETIC QN: 23 PG (ref 28–38)
IMM GRANULOCYTES # BLD AUTO: 0.03 X10*3/UL (ref 0–0.7)
IMM GRANULOCYTES NFR BLD AUTO: 0.3 % (ref 0–0.9)
IMMATURE RETIC FRACTION: 17.7 %
INR PPP: 2.3 (ref 0.9–1.1)
LDH SERPL L TO P-CCNC: 186 U/L (ref 84–246)
LYMPHOCYTES # BLD AUTO: 1.93 X10*3/UL (ref 1.2–4.8)
LYMPHOCYTES NFR BLD AUTO: 19.9 %
MAGNESIUM SERPL-MCNC: 2.11 MG/DL (ref 1.6–2.4)
MCH RBC QN AUTO: 26.9 PG (ref 26–34)
MCHC RBC AUTO-ENTMCNC: 29.4 G/DL (ref 32–36)
MCV RBC AUTO: 92 FL (ref 80–100)
MONOCYTES # BLD AUTO: 0.82 X10*3/UL (ref 0.1–1)
MONOCYTES NFR BLD AUTO: 8.5 %
NEUTROPHILS # BLD AUTO: 6.42 X10*3/UL (ref 1.2–7.7)
NEUTROPHILS NFR BLD AUTO: 66.3 %
NRBC BLD-RTO: 27.8 /100 WBCS (ref 0–0)
PLATELET # BLD AUTO: 250 X10*3/UL (ref 150–450)
POTASSIUM SERPL-SCNC: 4.6 MMOL/L (ref 3.5–5.3)
PROT SERPL-MCNC: 7.1 G/DL (ref 6.4–8.2)
PROTHROMBIN TIME: 25 SECONDS (ref 9.8–12.4)
RBC # BLD AUTO: 3.23 X10*6/UL (ref 4–5.2)
RETICS #: 0.28 X10*6/UL (ref 0.02–0.08)
RETICS/RBC NFR AUTO: 8.6 % (ref 0.5–2)
SODIUM SERPL-SCNC: 137 MMOL/L (ref 136–145)
UFH PPP CHRO-ACNC: 0.5 IU/ML (ref ?–1.1)
WBC # BLD AUTO: 9.7 X10*3/UL (ref 4.4–11.3)

## 2025-05-14 PROCEDURE — 1200000003 HC ONCOLOGY  ROOM WITH TELEMETRY DAILY

## 2025-05-14 PROCEDURE — 36415 COLL VENOUS BLD VENIPUNCTURE: CPT

## 2025-05-14 PROCEDURE — 2500000004 HC RX 250 GENERAL PHARMACY W/ HCPCS (ALT 636 FOR OP/ED)

## 2025-05-14 PROCEDURE — 2500000001 HC RX 250 WO HCPCS SELF ADMINISTERED DRUGS (ALT 637 FOR MEDICARE OP)

## 2025-05-14 PROCEDURE — 85045 AUTOMATED RETICULOCYTE COUNT: CPT

## 2025-05-14 PROCEDURE — 83615 LACTATE (LD) (LDH) ENZYME: CPT

## 2025-05-14 PROCEDURE — 80053 COMPREHEN METABOLIC PANEL: CPT

## 2025-05-14 PROCEDURE — 85520 HEPARIN ASSAY: CPT

## 2025-05-14 PROCEDURE — 2500000002 HC RX 250 W HCPCS SELF ADMINISTERED DRUGS (ALT 637 FOR MEDICARE OP, ALT 636 FOR OP/ED)

## 2025-05-14 PROCEDURE — 2500000004 HC RX 250 GENERAL PHARMACY W/ HCPCS (ALT 636 FOR OP/ED): Mod: TB

## 2025-05-14 PROCEDURE — 85025 COMPLETE CBC W/AUTO DIFF WBC: CPT

## 2025-05-14 PROCEDURE — 83735 ASSAY OF MAGNESIUM: CPT

## 2025-05-14 PROCEDURE — 85610 PROTHROMBIN TIME: CPT

## 2025-05-14 PROCEDURE — 99232 SBSQ HOSP IP/OBS MODERATE 35: CPT

## 2025-05-14 RX ORDER — HYDROMORPHONE HYDROCHLORIDE 1 MG/ML
0.5 INJECTION, SOLUTION INTRAMUSCULAR; INTRAVENOUS; SUBCUTANEOUS ONCE
Status: DISCONTINUED | OUTPATIENT
Start: 2025-05-14 | End: 2025-05-14

## 2025-05-14 RX ORDER — POLYETHYLENE GLYCOL 3350 17 G/17G
17 POWDER, FOR SOLUTION ORAL DAILY
Status: DISCONTINUED | OUTPATIENT
Start: 2025-05-14 | End: 2025-05-15 | Stop reason: HOSPADM

## 2025-05-14 RX ADMIN — OXYCODONE HYDROCHLORIDE 10 MG: 10 TABLET ORAL at 04:32

## 2025-05-14 RX ADMIN — GUAIFENESIN 600 MG: 600 TABLET, EXTENDED RELEASE ORAL at 10:01

## 2025-05-14 RX ADMIN — HYDROMORPHONE HYDROCHLORIDE 1 MG: 1 INJECTION, SOLUTION INTRAMUSCULAR; INTRAVENOUS; SUBCUTANEOUS at 15:59

## 2025-05-14 RX ADMIN — HYDROMORPHONE HYDROCHLORIDE 1 MG: 1 INJECTION, SOLUTION INTRAMUSCULAR; INTRAVENOUS; SUBCUTANEOUS at 06:39

## 2025-05-14 RX ADMIN — FUROSEMIDE 20 MG: 20 TABLET ORAL at 10:01

## 2025-05-14 RX ADMIN — OXYCODONE HYDROCHLORIDE 10 MG: 10 TABLET ORAL at 23:24

## 2025-05-14 RX ADMIN — BENZOCAINE AND MENTHOL 1 LOZENGE: 15; 3.6 LOZENGE ORAL at 10:01

## 2025-05-14 RX ADMIN — SENNOSIDES AND DOCUSATE SODIUM 1 TABLET: 50; 8.6 TABLET ORAL at 10:02

## 2025-05-14 RX ADMIN — HYDROMORPHONE HYDROCHLORIDE 1 MG: 1 INJECTION, SOLUTION INTRAMUSCULAR; INTRAVENOUS; SUBCUTANEOUS at 02:06

## 2025-05-14 RX ADMIN — CETIRIZINE HYDROCHLORIDE 10 MG: 10 TABLET, FILM COATED ORAL at 10:01

## 2025-05-14 RX ADMIN — HYDROMORPHONE HYDROCHLORIDE 1 MG: 1 INJECTION, SOLUTION INTRAMUSCULAR; INTRAVENOUS; SUBCUTANEOUS at 21:15

## 2025-05-14 RX ADMIN — GUAIFENESIN 600 MG: 600 TABLET, EXTENDED RELEASE ORAL at 21:15

## 2025-05-14 RX ADMIN — METOPROLOL TARTRATE 25 MG: 25 TABLET, FILM COATED ORAL at 21:15

## 2025-05-14 RX ADMIN — FOLIC ACID 1 MG: 1 TABLET ORAL at 10:01

## 2025-05-14 RX ADMIN — METOPROLOL TARTRATE 25 MG: 25 TABLET, FILM COATED ORAL at 10:02

## 2025-05-14 RX ADMIN — OXYCODONE HYDROCHLORIDE 10 MG: 10 TABLET ORAL at 13:59

## 2025-05-14 RX ADMIN — HYDROMORPHONE HYDROCHLORIDE 1.5 MG: 2 INJECTION, SOLUTION INTRAMUSCULAR; INTRAVENOUS; SUBCUTANEOUS at 10:35

## 2025-05-14 RX ADMIN — SENNOSIDES AND DOCUSATE SODIUM 1 TABLET: 50; 8.6 TABLET ORAL at 21:15

## 2025-05-14 RX ADMIN — ATROPINE SULFATE 1 DROP: 10 SOLUTION/ DROPS OPHTHALMIC at 21:20

## 2025-05-14 RX ADMIN — OXYCODONE HYDROCHLORIDE 10 MG: 10 TABLET ORAL at 18:36

## 2025-05-14 RX ADMIN — OXYCODONE HYDROCHLORIDE 10 MG: 10 TABLET ORAL at 00:29

## 2025-05-14 RX ADMIN — WARFARIN SODIUM 5 MG: 5 TABLET ORAL at 18:59

## 2025-05-14 RX ADMIN — OXYCODONE HYDROCHLORIDE AND ACETAMINOPHEN 500 MG: 500 TABLET ORAL at 10:01

## 2025-05-14 ASSESSMENT — PAIN - FUNCTIONAL ASSESSMENT
PAIN_FUNCTIONAL_ASSESSMENT: 0-10
PAIN_FUNCTIONAL_ASSESSMENT: 0-10
PAIN_FUNCTIONAL_ASSESSMENT: UNABLE TO SELF-REPORT
PAIN_FUNCTIONAL_ASSESSMENT: 0-10

## 2025-05-14 ASSESSMENT — PAIN SCALES - GENERAL
PAINLEVEL_OUTOF10: 8
PAINLEVEL_OUTOF10: 9
PAINLEVEL_OUTOF10: 8
PAINLEVEL_OUTOF10: 8
PAINLEVEL_OUTOF10: 9
PAINLEVEL_OUTOF10: 9
PAINLEVEL_OUTOF10: 8
PAINLEVEL_OUTOF10: 7
PAINLEVEL_OUTOF10: 9
PAINLEVEL_OUTOF10: 8
PAINLEVEL_OUTOF10: 8
PAINLEVEL_OUTOF10: 9

## 2025-05-14 ASSESSMENT — PAIN DESCRIPTION - LOCATION: LOCATION: GENERALIZED

## 2025-05-14 ASSESSMENT — COGNITIVE AND FUNCTIONAL STATUS - GENERAL
MOBILITY SCORE: 23
CLIMB 3 TO 5 STEPS WITH RAILING: A LITTLE
DAILY ACTIVITIY SCORE: 24

## 2025-05-14 NOTE — SIGNIFICANT EVENT
Patient's telemetry batteries changed.  Telemetry monitor turned back on and functioning appropriately.    Marlene Adames RN

## 2025-05-14 NOTE — CARE PLAN
The patient's goals for the shift include      The clinical goals for the shift include pt will remain HDS and VSS throughout shift end on 5/14/25 @0700      Problem: Pain - Adult  Goal: Verbalizes/displays adequate comfort level or baseline comfort level  Outcome: Progressing     Problem: Safety - Adult  Goal: Free from fall injury  Outcome: Progressing     Problem: Discharge Planning  Goal: Discharge to home or other facility with appropriate resources  Outcome: Progressing     Problem: Chronic Conditions and Co-morbidities  Goal: Patient's chronic conditions and co-morbidity symptoms are monitored and maintained or improved  Outcome: Progressing     Problem: Nutrition  Goal: Nutrient intake appropriate for maintaining nutritional needs  Outcome: Progressing     Problem: Fall/Injury  Goal: Not fall by end of shift  Outcome: Progressing  Goal: Be free from injury by end of the shift  Outcome: Progressing  Goal: Verbalize understanding of personal risk factors for fall in the hospital  Outcome: Progressing  Goal: Verbalize understanding of risk factor reduction measures to prevent injury from fall in the home  Outcome: Progressing  Goal: Use assistive devices by end of the shift  Outcome: Progressing  Goal: Pace activities to prevent fatigue by end of the shift  Outcome: Progressing     Problem: Pain  Goal: Takes deep breaths with improved pain control throughout the shift  Outcome: Progressing  Goal: Turns in bed with improved pain control throughout the shift  Outcome: Progressing  Goal: Walks with improved pain control throughout the shift  Outcome: Progressing  Goal: Performs ADL's with improved pain control throughout shift  Outcome: Progressing  Goal: Participates in PT with improved pain control throughout the shift  Outcome: Progressing  Goal: Free from opioid side effects throughout the shift  Outcome: Progressing  Goal: Free from acute confusion related to pain meds throughout the shift  Outcome:  Progressing     Problem: Skin  Goal: Participates in plan/prevention/treatment measures  Outcome: Progressing  Goal: Prevent/manage excess moisture  Outcome: Progressing  Goal: Prevent/minimize sheer/friction injuries  Outcome: Progressing  Goal: Promote/optimize nutrition  Outcome: Progressing  Goal: Promote skin healing  Outcome: Progressing

## 2025-05-14 NOTE — PROGRESS NOTES
Senait Narvaez is a 55 y.o. female on day 7 of admission presenting with Pneumonia due to infectious organism, unspecified laterality, unspecified part of lung.    Subjective   Patient resting in bed, states pain 6/10 in left leg and lower back, is slightly worse this morning as has not had pain medications while sleeping. Discussed CPAP use with patient, states has difficulty with hospital machine as it makes her claustrophobic, uses home machine without issue. Discussed discharge tomorrow pending pain, patient in agreement. Plan to discontinue heparin drip today as INR is therapeutic. Discussed pulling apheresis line today, however, patient is also using for IV access and blood draws, plan to pull in AM prior to discharge. Has continued swelling in right leg, no pain, redness, tenderness. Denies chest pain, SOB, n/v/c/d, headache, dizziness, lightheadedness, vision changes.       Objective     Physical Exam  Constitutional:       Appearance: Normal appearance.      Interventions: Nasal cannula in place.   HENT:      Head: Normocephalic.   Eyes:      Extraocular Movements: Extraocular movements intact.      Conjunctiva/sclera: Conjunctivae normal.      Pupils: Pupils are equal, round, and reactive to light.   Cardiovascular:      Rate and Rhythm: Normal rate and regular rhythm.      Pulses: Normal pulses.      Heart sounds: Normal heart sounds.   Pulmonary:      Effort: Pulmonary effort is normal.      Breath sounds: Normal breath sounds.   Abdominal:      General: Abdomen is flat. Bowel sounds are normal.      Palpations: Abdomen is soft.   Musculoskeletal:      Cervical back: Normal range of motion.      Right lower le+ Edema present.   Skin:     General: Skin is warm and dry.   Neurological:      General: No focal deficit present.      Mental Status: She is alert and oriented to person, place, and time.   Psychiatric:         Mood and Affect: Mood normal.         Behavior: Behavior normal.         Last Recorded  "Vitals  Blood pressure 134/58, pulse 77, temperature 36.3 °C (97.3 °F), temperature source Temporal, resp. rate 18, height 1.651 m (5' 5\"), weight 107 kg (235 lb 0.2 oz), SpO2 94%.  Intake/Output last 3 Shifts:  I/O last 3 completed shifts:  In: 808.5 (7.6 mL/kg) [P.O.:450; I.V.:208.5 (2 mL/kg); IV Piggyback:150]  Out: - (0 mL/kg)   Weight: 106.6 kg     Relevant Results               This patient has a central line   Reason for the central line remaining today? Parenteral medication    Scheduled medications  Scheduled Medications[1]  Continuous medications  Continuous Medications[2]  PRN medications  PRN Medications[3]  Results for orders placed or performed during the hospital encounter of 05/07/25 (from the past 24 hours)   Magnesium   Result Value Ref Range    Magnesium 2.11 1.60 - 2.40 mg/dL   CBC and Auto Differential   Result Value Ref Range    WBC 9.7 4.4 - 11.3 x10*3/uL    nRBC 27.8 (H) 0.0 - 0.0 /100 WBCs    RBC 3.23 (L) 4.00 - 5.20 x10*6/uL    Hemoglobin 8.7 (L) 12.0 - 16.0 g/dL    Hematocrit 29.6 (L) 36.0 - 46.0 %    MCV 92 80 - 100 fL    MCH 26.9 26.0 - 34.0 pg    MCHC 29.4 (L) 32.0 - 36.0 g/dL    RDW 23.8 (H) 11.5 - 14.5 %    Platelets 250 150 - 450 x10*3/uL    Neutrophils % 66.3 40.0 - 80.0 %    Immature Granulocytes %, Automated 0.3 0.0 - 0.9 %    Lymphocytes % 19.9 13.0 - 44.0 %    Monocytes % 8.5 2.0 - 10.0 %    Eosinophils % 4.8 0.0 - 6.0 %    Basophils % 0.2 0.0 - 2.0 %    Neutrophils Absolute 6.42 1.20 - 7.70 x10*3/uL    Immature Granulocytes Absolute, Automated 0.03 0.00 - 0.70 x10*3/uL    Lymphocytes Absolute 1.93 1.20 - 4.80 x10*3/uL    Monocytes Absolute 0.82 0.10 - 1.00 x10*3/uL    Eosinophils Absolute 0.46 0.00 - 0.70 x10*3/uL    Basophils Absolute 0.02 0.00 - 0.10 x10*3/uL   Comprehensive Metabolic Panel   Result Value Ref Range    Glucose 98 74 - 99 mg/dL    Sodium 137 136 - 145 mmol/L    Potassium 4.6 3.5 - 5.3 mmol/L    Chloride 96 (L) 98 - 107 mmol/L    Bicarbonate 36 (H) 21 - 32 " mmol/L    Anion Gap 10 10 - 20 mmol/L    Urea Nitrogen 14 6 - 23 mg/dL    Creatinine 1.17 (H) 0.50 - 1.05 mg/dL    eGFR 55 (L) >60 mL/min/1.73m*2    Calcium 8.9 8.6 - 10.6 mg/dL    Albumin 3.7 3.4 - 5.0 g/dL    Alkaline Phosphatase 116 (H) 33 - 110 U/L    Total Protein 7.1 6.4 - 8.2 g/dL    AST 12 9 - 39 U/L    Bilirubin, Total 0.6 0.0 - 1.2 mg/dL    ALT 7 7 - 45 U/L   Lactate Dehydrogenase   Result Value Ref Range     84 - 246 U/L   Reticulocytes   Result Value Ref Range    Retic % 8.6 (H) 0.5 - 2.0 %    Retic Absolute 0.278 (H) 0.018 - 0.083 x10*6/uL    Reticulocyte Hemoglobin 23 (L) 28 - 38 pg    Immature Retic fraction 17.7 (H) <=16.0 %   Protime-INR   Result Value Ref Range    Protime 25.0 (H) 9.8 - 12.4 seconds    INR 2.3 (H) 0.9 - 1.1   Heparin Assay, UFH   Result Value Ref Range    Heparin Unfractionated 0.5 See Comment Below for Therapeutic Ranges IU/mL     *Note: Due to a large number of results and/or encounters for the requested time period, some results have not been displayed. A complete set of results can be found in Results Review.     Lower extremity venous duplex bilateral  Result Date: 5/13/2025            [place] [address]   Tel [phone] and Fax [phone]  Vascular Lab Report [facility] US LOWER EXTREMITY VENOUS DUPLEX BILATERAL  Patient Name:      KIRSTY Saenz Physician:  [id number] Raiza Brown MD Study Date:        5/13/2025            Ordering Physician: [id number] MEJIA MRN/PID:           [id number]             Technologist:       Steffi Sánchez RVT Accession#:        [id number]         Technologist 2: Date of Birth/Age: 1970 / 55 years Encounter#:         [id number] Gender:            F Admission Status:  Inpatient            Location Performed: [facility]  Diagnosis/ICD:  Localized (leg) edema-R60.0 CPT Codes:     42957 Peripheral venous duplex scan for DVT complete  Patient History Acute sickle cell crisis.  CONCLUSIONS: Right Lower Venous: No evidence of acute deep vein thrombus visualized in the right lower extremity. Cannot rule out thrombus in non-visualized proximal femoral vein due to Line in prox thigh/groin.Dressing covers prox thigh. Additional Findings; Lymph nodes and Line in CFV. Lymph node measures 5.55 mm x 7.95 mm. Left Lower Venous: No evidence of acute deep vein thrombus visualized in the left lower extremity.  Additional Findings: Difficult study due to patient respirations. Holds breath while sleeping.  Imaging & Doppler Findings:  Right                 Compressible Thrombus        Flow Distal External Iliac                None   Spontaneous/Phasic CFV                       Yes        None   Spontaneous/Phasic FV Proximal                          None FV Mid                    Yes        None FV Distal                 Yes        None Popliteal                 Yes        None   Spontaneous/Phasic Peroneal                  Yes        None PTV                       Yes        None  Left                  Compress Thrombus        Flow Distal External Iliac            None   Spontaneous/Phasic CFV                     Yes      None   Spontaneous/Phasic PFV                     Yes      None FV Proximal             Yes      None   Spontaneous/Phasic FV Mid                  Yes      None FV Distal               Yes      None Popliteal               Yes      None   Spontaneous/Phasic Peroneal                Yes      None PTV                     Yes      None  [id number] Raiza Brown MD Electronically signed by [id number] Raiza Brown MD on 5/13/2025 at 4:18:15 PM  ** Final **                  Assessment & Plan  Pneumonia due to infectious organism, unspecified laterality, unspecified part of lung    Senait Narvaez is a 55 y.o. 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 5/7  with c/o pain \"all over\"  despite home pain regimen. On arrival found to be hypoxic to 82% on RA, placed on 2L O2 via NC. Patient admitted for pain control. CXR on admit showing consolidation vs atelectasis. Started on Azithromycin (5/8-5/10) and Ceftriaxone (5/8-end date 5/13). Later that evening, transferred to the MICU 5/8-5/9 for hypotension and hypercanpic hypoxic respiratory failure that inproved with BIPAP, s/p exchange transfusion 5/8, and transferred back to [place] on 5/9. She remains inpatient while bridging back to warfarin and managing pain. INR at goal 5/14, heparin drip discontinued, continue Warfarin. RLE with mild swelling, BLE duplex negative for DVT 5/13. Discharge home with resumed CPAP pending improvement in pain, likely 5/15.     Updates 5/14:   - INR 2.3, spoke to pharmacy, discontinue heparin drip today  - Left apheresis line in place today as patient is using for IV pain medication and blood draws, plan to pull at discharge likely tomorrow  -  having difficulty with hospital CPAP as makes her feel claustrophobic, states wears home CPAP without issue     # acute hypercapnic hypoxic respiratory failure - improved   # acute chest syndrome  # CAP  # pulmonary HTN  - on admission hypoxic to 82% on RA, placed on 2L O2 via NC  - CXR on admit (5/8) showing consolidation vs atelectasis  - transferred to the MICU 5/8-5/9 for hypotension and hypercanpic hypoxic respiratory failure that inproved with BIPAP  - lactate WNL   - VBG showed PH:7.15->7.20->7.23-> 7.32 (5/13), Co2 of 86->75->71 with bicarb of 28, respiratory acidosis   - Hgb S 22.6% (5/8)   - s/p exchange transfusion 5/8  - Repeat Hgb " S 9.8% (5/9)   - Azithromycin (5/8-5/10) and Ceftriaxone (5/8-end date 5/13)  - VBG 5/13 improved   - Was previous on 4L, weaned to RA 5/12 with O2/ CPAP at night   - pt should be put on CPAP nightly and whenever she takes nap with setting of 16/8 (patient has been refusing due to feelings of claustrophobia with face mask)  - cont pulse ox   - apheresis line remains in place for IV pain meds labs; will need removed prior to DC; consider removal 5/15 prior to discharge     # Hb SC disease   # Acute on chronic SCD related pain crisis  - currently on q6 week RBC exchange transfusions (most recent outpatient 4/18 and inpatient 5/8)  - [facility] reviewed, last updated 4/16/25  - OARRS reviewed, no aberrant behavior noted   - Apheresis line in place from exchange (kept in place for labs and hep gtt)   - Continue oxycodone 5mg PRN for moderate pain, and 10mg PRN for severe pain.  dilaudid 1 mg IV q4h PRN for breakthrough pain (was not rotated appropriately since admission)   - Started rotating 10mg PO oxycodone Q4hrs for severe pain and 1mg IVP dilaudid Q4hrs for breakthrough pain (5/13- )   - Cyclobenzaprine 10 mg every 8 hours prn   - Bowel regimen for opioid induced constipation with Senna 2tabs BID and Miralax daily, PO Benadryl 25 mg q6h PRN for opioid-induced pruritus, PO Zofran 8 mg q8h PRN for opioid-induced nausea  - c/w home folic acid 1 mg daily  - Utox: + cannabinoid (3/4)   - IS encouraged      # CHARLES-improving   # hyperkalemia - resolved  - Etiology likely pre-renal secondary to exchange, no new medications initiated this admission   - Baseline Cr ~1.0, 1.32 (5/11) --> 1.24 (5/12)   - s/p lokelma 10g q8  - urine electrolytes (5/9); FeNA 0.1% most likely indicating pre-renal   - PO hydration encouraged, held off on IV fluids as pt is slightly volume overloaded   - avoid nephrotoxins  - nephrology consulted recs appreciated while in MICU, no nidication for dialysis     # hx of DVT/PE  # hx of SVC syndrome  # Mild  RLE swelling   - On warfarin at home, INR 1.5 on admission  - heparin gtt  - Warfarin restarted 5/9 will need to bridge with heparin gtt   - INR 2.3 5/14, heparin drip discontinued, warfarin continued  - BLE duplex negative for DVT 5/13 given mild RLE edema      # HFpEF  # pHTN  # troponinemia   - Last TTE 5/9/25: HFpEF with EF of 67% right ventricular overloaded , severely enlarged RV   - troponin leak likely iso demand  - on lasix 20 mg every other day at home, continue on admission  - trop 320 ->310      # hx of SVT  - Metoprolol tartrate 25 mg BID  - tele     # Prophy   - Warfarin, OOB as able, SCDs while in bed      Dispo   - Discharge after  improvement in  pain, likely 5/15  - FUV ophtho 5/21, Sickle cell 5/21, surg onc 8/1, cards 8/25  - Emergency contact Damian Narvaez [phone]    Assessment and plan as above discussed with attending physician Dr. Hensley        I spent 60 minutes in the professional and overall care of this patient.      Genevieve Pierre, APRN-CNP         [1] ascorbic acid, 500 mg, oral, Daily  atropine, 1 drop, Left Eye, Nightly  cetirizine, 10 mg, oral, Daily  folic acid, 1 mg, oral, Daily  furosemide, 20 mg, oral, Every other day  guaiFENesin, 600 mg, oral, BID  lidocaine, 1 patch, transdermal, Daily  metoprolol tartrate, 25 mg, oral, BID  oxygen, , inhalation, Continuous - Inhalation  sennosides-docusate sodium, 1 tablet, oral, BID  warfarin, 5 mg, oral, Once per day on Sunday Monday Wednesday Thursday Friday Saturday  warfarin, 7.5 mg, oral, Every Tuesday  [2]    [3] PRN medications: albuterol, alteplase, benzocaine-menthol, cyclobenzaprine, diphenhydrAMINE, fluticasone, HYDROmorphone, ondansetron, oxyCODONE, oxygen, phenoL, sodium chloride

## 2025-05-15 ENCOUNTER — PHARMACY VISIT (OUTPATIENT)
Dept: PHARMACY | Facility: CLINIC | Age: 55
End: 2025-05-15
Payer: COMMERCIAL

## 2025-05-15 VITALS
DIASTOLIC BLOOD PRESSURE: 65 MMHG | RESPIRATION RATE: 18 BRPM | HEIGHT: 65 IN | OXYGEN SATURATION: 95 % | SYSTOLIC BLOOD PRESSURE: 100 MMHG | TEMPERATURE: 97.7 F | WEIGHT: 235.01 LBS | HEART RATE: 74 BPM | BODY MASS INDEX: 39.16 KG/M2

## 2025-05-15 LAB
ALBUMIN SERPL BCP-MCNC: 3.3 G/DL (ref 3.4–5)
ALP SERPL-CCNC: 99 U/L (ref 33–110)
ALT SERPL W P-5'-P-CCNC: 7 U/L (ref 7–45)
ANION GAP SERPL CALC-SCNC: 8 MMOL/L (ref 10–20)
AST SERPL W P-5'-P-CCNC: 11 U/L (ref 9–39)
ATRIAL RATE: 66 BPM
BASOPHILS # BLD AUTO: 0.02 X10*3/UL (ref 0–0.1)
BASOPHILS NFR BLD AUTO: 0.3 %
BILIRUB SERPL-MCNC: 0.7 MG/DL (ref 0–1.2)
BUN SERPL-MCNC: 14 MG/DL (ref 6–23)
CALCIUM SERPL-MCNC: 8.8 MG/DL (ref 8.6–10.6)
CHLORIDE SERPL-SCNC: 96 MMOL/L (ref 98–107)
CO2 SERPL-SCNC: 37 MMOL/L (ref 21–32)
CREAT SERPL-MCNC: 1 MG/DL (ref 0.5–1.05)
EGFRCR SERPLBLD CKD-EPI 2021: 66 ML/MIN/1.73M*2
EOSINOPHIL # BLD AUTO: 0.43 X10*3/UL (ref 0–0.7)
EOSINOPHIL NFR BLD AUTO: 5.4 %
ERYTHROCYTE [DISTWIDTH] IN BLOOD BY AUTOMATED COUNT: 24.7 % (ref 11.5–14.5)
GLUCOSE SERPL-MCNC: 101 MG/DL (ref 74–99)
HCT VFR BLD AUTO: 28.5 % (ref 36–46)
HGB BLD-MCNC: 8.4 G/DL (ref 12–16)
HGB RETIC QN: 21 PG (ref 28–38)
IMM GRANULOCYTES # BLD AUTO: 0.03 X10*3/UL (ref 0–0.7)
IMM GRANULOCYTES NFR BLD AUTO: 0.4 % (ref 0–0.9)
IMMATURE RETIC FRACTION: 13.4 %
INR PPP: 2.8 (ref 0.9–1.1)
LDH SERPL L TO P-CCNC: 190 U/L (ref 84–246)
LYMPHOCYTES # BLD AUTO: 1.29 X10*3/UL (ref 1.2–4.8)
LYMPHOCYTES NFR BLD AUTO: 16.3 %
MAGNESIUM SERPL-MCNC: 2 MG/DL (ref 1.6–2.4)
MCH RBC QN AUTO: 27.3 PG (ref 26–34)
MCHC RBC AUTO-ENTMCNC: 29.5 G/DL (ref 32–36)
MCV RBC AUTO: 93 FL (ref 80–100)
MONOCYTES # BLD AUTO: 0.66 X10*3/UL (ref 0.1–1)
MONOCYTES NFR BLD AUTO: 8.3 %
NEUTROPHILS # BLD AUTO: 5.5 X10*3/UL (ref 1.2–7.7)
NEUTROPHILS NFR BLD AUTO: 69.3 %
NRBC BLD-RTO: 29.9 /100 WBCS (ref 0–0)
P AXIS: 62 DEGREES
P OFFSET: 191 MS
P ONSET: 141 MS
PLATELET # BLD AUTO: 254 X10*3/UL (ref 150–450)
POTASSIUM SERPL-SCNC: 4.9 MMOL/L (ref 3.5–5.3)
PR INTERVAL: 176 MS
PROT SERPL-MCNC: 6.4 G/DL (ref 6.4–8.2)
PROTHROMBIN TIME: 30.8 SECONDS (ref 9.8–12.4)
Q ONSET: 229 MS
QRS COUNT: 11 BEATS
QRS DURATION: 84 MS
QT INTERVAL: 446 MS
QTC CALCULATION(BAZETT): 467 MS
QTC FREDERICIA: 460 MS
R AXIS: 127 DEGREES
RBC # BLD AUTO: 3.08 X10*6/UL (ref 4–5.2)
RETICS #: 0.27 X10*6/UL (ref 0.02–0.08)
RETICS/RBC NFR AUTO: 8.9 % (ref 0.5–2)
SODIUM SERPL-SCNC: 136 MMOL/L (ref 136–145)
T AXIS: 133 DEGREES
T OFFSET: 452 MS
VENTRICULAR RATE: 66 BPM
WBC # BLD AUTO: 7.9 X10*3/UL (ref 4.4–11.3)

## 2025-05-15 PROCEDURE — 2500000001 HC RX 250 WO HCPCS SELF ADMINISTERED DRUGS (ALT 637 FOR MEDICARE OP)

## 2025-05-15 PROCEDURE — 36415 COLL VENOUS BLD VENIPUNCTURE: CPT

## 2025-05-15 PROCEDURE — 2500000005 HC RX 250 GENERAL PHARMACY W/O HCPCS

## 2025-05-15 PROCEDURE — 85045 AUTOMATED RETICULOCYTE COUNT: CPT

## 2025-05-15 PROCEDURE — 83615 LACTATE (LD) (LDH) ENZYME: CPT

## 2025-05-15 PROCEDURE — 85610 PROTHROMBIN TIME: CPT

## 2025-05-15 PROCEDURE — RXMED WILLOW AMBULATORY MEDICATION CHARGE

## 2025-05-15 PROCEDURE — 85025 COMPLETE CBC W/AUTO DIFF WBC: CPT

## 2025-05-15 PROCEDURE — 2500000004 HC RX 250 GENERAL PHARMACY W/ HCPCS (ALT 636 FOR OP/ED): Mod: JZ,TB

## 2025-05-15 PROCEDURE — 80053 COMPREHEN METABOLIC PANEL: CPT

## 2025-05-15 PROCEDURE — 99232 SBSQ HOSP IP/OBS MODERATE 35: CPT

## 2025-05-15 PROCEDURE — 99239 HOSP IP/OBS DSCHRG MGMT >30: CPT

## 2025-05-15 PROCEDURE — 83735 ASSAY OF MAGNESIUM: CPT

## 2025-05-15 RX ADMIN — FOLIC ACID 1 MG: 1 TABLET ORAL at 08:44

## 2025-05-15 RX ADMIN — OXYCODONE HYDROCHLORIDE 10 MG: 10 TABLET ORAL at 11:21

## 2025-05-15 RX ADMIN — OXYCODONE HYDROCHLORIDE 10 MG: 10 TABLET ORAL at 04:04

## 2025-05-15 RX ADMIN — METOPROLOL TARTRATE 25 MG: 25 TABLET, FILM COATED ORAL at 08:44

## 2025-05-15 RX ADMIN — CETIRIZINE HYDROCHLORIDE 10 MG: 10 TABLET, FILM COATED ORAL at 08:44

## 2025-05-15 RX ADMIN — HYDROMORPHONE HYDROCHLORIDE 1 MG: 1 INJECTION, SOLUTION INTRAMUSCULAR; INTRAVENOUS; SUBCUTANEOUS at 01:42

## 2025-05-15 RX ADMIN — OXYCODONE HYDROCHLORIDE AND ACETAMINOPHEN 500 MG: 500 TABLET ORAL at 08:44

## 2025-05-15 RX ADMIN — HYDROMORPHONE HYDROCHLORIDE 1 MG: 1 INJECTION, SOLUTION INTRAMUSCULAR; INTRAVENOUS; SUBCUTANEOUS at 08:44

## 2025-05-15 RX ADMIN — GUAIFENESIN 600 MG: 600 TABLET, EXTENDED RELEASE ORAL at 08:44

## 2025-05-15 RX ADMIN — Medication 2 L/MIN: at 08:44

## 2025-05-15 ASSESSMENT — COGNITIVE AND FUNCTIONAL STATUS - GENERAL
DAILY ACTIVITIY SCORE: 24
MOBILITY SCORE: 24

## 2025-05-15 ASSESSMENT — PAIN - FUNCTIONAL ASSESSMENT
PAIN_FUNCTIONAL_ASSESSMENT: 0-10

## 2025-05-15 NOTE — CARE PLAN
Problem: Pain - Adult  Goal: Verbalizes/displays adequate comfort level or baseline comfort level  Outcome: Adequate for Discharge     Problem: Safety - Adult  Goal: Free from fall injury  Outcome: Adequate for Discharge     Problem: Discharge Planning  Goal: Discharge to home or other facility with appropriate resources  Outcome: Adequate for Discharge     Problem: Chronic Conditions and Co-morbidities  Goal: Patient's chronic conditions and co-morbidity symptoms are monitored and maintained or improved  Outcome: Adequate for Discharge     Problem: Nutrition  Goal: Nutrient intake appropriate for maintaining nutritional needs  Outcome: Adequate for Discharge     Problem: Fall/Injury  Goal: Not fall by end of shift  Outcome: Adequate for Discharge  Goal: Be free from injury by end of the shift  Outcome: Adequate for Discharge  Goal: Verbalize understanding of personal risk factors for fall in the hospital  Outcome: Adequate for Discharge  Goal: Verbalize understanding of risk factor reduction measures to prevent injury from fall in the home  Outcome: Adequate for Discharge  Goal: Use assistive devices by end of the shift  Outcome: Adequate for Discharge  Goal: Pace activities to prevent fatigue by end of the shift  Outcome: Adequate for Discharge     Problem: Pain  Goal: Takes deep breaths with improved pain control throughout the shift  Outcome: Adequate for Discharge  Goal: Turns in bed with improved pain control throughout the shift  Outcome: Adequate for Discharge  Goal: Walks with improved pain control throughout the shift  Outcome: Adequate for Discharge  Goal: Performs ADL's with improved pain control throughout shift  Outcome: Adequate for Discharge  Goal: Participates in PT with improved pain control throughout the shift  Outcome: Adequate for Discharge  Goal: Free from opioid side effects throughout the shift  Outcome: Adequate for Discharge  Goal: Free from acute confusion related to pain meds  throughout the shift  Outcome: Adequate for Discharge     Problem: Skin  Goal: Participates in plan/prevention/treatment measures  Outcome: Adequate for Discharge  Goal: Prevent/manage excess moisture  Outcome: Adequate for Discharge  Goal: Prevent/minimize sheer/friction injuries  Outcome: Adequate for Discharge  Goal: Promote/optimize nutrition  Outcome: Adequate for Discharge  Goal: Promote skin healing  Outcome: Adequate for Discharge   The patient's goals for the shift include      The clinical goals for the shift include Pt will remain safe in room and use call light during shift on 5/15/25 @ 1930    Pt was VSS upon discharge. Pt reviewed discharge paperwork including medications, discharge instructions, and follow up appointments. Pt verbalized understanding and denied questions at the time. Central line access was removed by provider prior to discharge. Pt received meds to beds. Pt collected belongings and brought them home. Pt was transported by mother. Discharge complete.   Shelley Saldivar RN

## 2025-05-15 NOTE — PROGRESS NOTES
"Senait Narvaez is a 55 y.o. female on day 8 of admission presenting with Pneumonia due to infectious organism, unspecified laterality, unspecified part of lung.    Subjective   Pt resting in bed, completed bodywork today for fatigue and back/leg pain.    Objective     Physical Exam  Vitals and nursing note reviewed.   Constitutional:       General: She is not in acute distress.     Appearance: Normal appearance. She is obese. She is ill-appearing.   HENT:      Head: Normocephalic and atraumatic.      Nose: Nose normal.      Mouth/Throat:      Mouth: Mucous membranes are moist.   Eyes:      Extraocular Movements: Extraocular movements intact.      Pupils: Pupils are equal, round, and reactive to light.   Cardiovascular:      Rate and Rhythm: Normal rate.   Pulmonary:      Effort: Pulmonary effort is normal.   Abdominal:      General: Abdomen is flat.      Palpations: Abdomen is soft.   Skin:     General: Skin is warm and dry.   Neurological:      General: No focal deficit present.      Mental Status: She is alert and oriented to person, place, and time. Mental status is at baseline.   Psychiatric:         Mood and Affect: Mood normal.         Behavior: Behavior normal.         Thought Content: Thought content normal.         Judgment: Judgment normal.         Last Recorded Vitals  Blood pressure 100/65, pulse 74, temperature 36.5 °C (97.7 °F), temperature source Temporal, resp. rate 18, height 1.651 m (5' 5\"), weight 107 kg (235 lb 0.2 oz), SpO2 95%.  Intake/Output last 3 Shifts:  I/O last 3 completed shifts:  In: 326.1 (3.1 mL/kg) [P.O.:50; I.V.:226.1 (2.1 mL/kg); IV Piggyback:50]  Out: - (0 mL/kg)   Weight: 106.6 kg     Relevant Results  Scheduled medications  Scheduled Medications[1]  Continuous medications  Continuous Medications[2]  PRN medications  PRN Medications[3]    Results for orders placed or performed during the hospital encounter of 05/07/25 (from the past 24 hours)   Magnesium   Result Value Ref Range    " Magnesium 2.00 1.60 - 2.40 mg/dL   CBC and Auto Differential   Result Value Ref Range    WBC 7.9 4.4 - 11.3 x10*3/uL    nRBC 29.9 (H) 0.0 - 0.0 /100 WBCs    RBC 3.08 (L) 4.00 - 5.20 x10*6/uL    Hemoglobin 8.4 (L) 12.0 - 16.0 g/dL    Hematocrit 28.5 (L) 36.0 - 46.0 %    MCV 93 80 - 100 fL    MCH 27.3 26.0 - 34.0 pg    MCHC 29.5 (L) 32.0 - 36.0 g/dL    RDW 24.7 (H) 11.5 - 14.5 %    Platelets 254 150 - 450 x10*3/uL    Neutrophils % 69.3 40.0 - 80.0 %    Immature Granulocytes %, Automated 0.4 0.0 - 0.9 %    Lymphocytes % 16.3 13.0 - 44.0 %    Monocytes % 8.3 2.0 - 10.0 %    Eosinophils % 5.4 0.0 - 6.0 %    Basophils % 0.3 0.0 - 2.0 %    Neutrophils Absolute 5.50 1.20 - 7.70 x10*3/uL    Immature Granulocytes Absolute, Automated 0.03 0.00 - 0.70 x10*3/uL    Lymphocytes Absolute 1.29 1.20 - 4.80 x10*3/uL    Monocytes Absolute 0.66 0.10 - 1.00 x10*3/uL    Eosinophils Absolute 0.43 0.00 - 0.70 x10*3/uL    Basophils Absolute 0.02 0.00 - 0.10 x10*3/uL   Comprehensive Metabolic Panel   Result Value Ref Range    Glucose 101 (H) 74 - 99 mg/dL    Sodium 136 136 - 145 mmol/L    Potassium 4.9 3.5 - 5.3 mmol/L    Chloride 96 (L) 98 - 107 mmol/L    Bicarbonate 37 (H) 21 - 32 mmol/L    Anion Gap 8 (L) 10 - 20 mmol/L    Urea Nitrogen 14 6 - 23 mg/dL    Creatinine 1.00 0.50 - 1.05 mg/dL    eGFR 66 >60 mL/min/1.73m*2    Calcium 8.8 8.6 - 10.6 mg/dL    Albumin 3.3 (L) 3.4 - 5.0 g/dL    Alkaline Phosphatase 99 33 - 110 U/L    Total Protein 6.4 6.4 - 8.2 g/dL    AST 11 9 - 39 U/L    Bilirubin, Total 0.7 0.0 - 1.2 mg/dL    ALT 7 7 - 45 U/L   Lactate Dehydrogenase   Result Value Ref Range     84 - 246 U/L   Reticulocytes   Result Value Ref Range    Retic % 8.9 (H) 0.5 - 2.0 %    Retic Absolute 0.273 (H) 0.018 - 0.083 x10*6/uL    Reticulocyte Hemoglobin 21 (L) 28 - 38 pg    Immature Retic fraction 13.4 <=16.0 %   Protime-INR   Result Value Ref Range    Protime 30.8 (H) 9.8 - 12.4 seconds    INR 2.8 (H) 0.9 - 1.1     *Note: Due to a large  number of results and/or encounters for the requested time period, some results have not been displayed. A complete set of results can be found in Results Review.     Electrocardiogram, 12-lead PRN ACS symptoms  Result Date: 5/15/2025  Normal sinus rhythm Right axis deviation Possible Right ventricular hypertrophy Septal infarct (cited on or before 02-MAR-2025) ST & T wave abnormality, consider anterolateral ischemia Abnormal ECG When compared with ECG of 08-MAY-2025 03:14, (unconfirmed) No significant change was found Confirmed by Low Roper ([id number]) on 5/15/2025 12:30:58 PM    Lower extremity venous duplex bilateral  Result Date: 5/13/2025            [place] [id number]   Tel [phone] and Fax [phone]  Vascular Lab Report [facility] US LOWER EXTREMITY VENOUS DUPLEX BILATERAL  Patient Name:      KIRSTY Saenz Physician:  [id number] Raiza Brown MD Study Date:        5/13/2025            Ordering Physician: [id number] MEJIA MRN/PID:           [id number]             Technologist:       Steffi Sánchez T Accession#:        [id number]         Technologist 2: Date of Birth/Age: 1970 / 55 years Encounter#:         [id number] Gender:            F Admission Status:  Inpatient            Location Performed: [facility]  Diagnosis/ICD: Localized (leg) edema-R60.0 CPT Codes:     52803 Peripheral venous duplex scan for DVT complete  Patient History Acute sickle cell crisis.  CONCLUSIONS: Right Lower Venous: No evidence of acute deep vein thrombus visualized in the right lower extremity. Cannot rule out thrombus in non-visualized proximal femoral vein due to Line in prox thigh/groin.Dressing covers prox thigh. Additional Findings; Lymph nodes and Line in CFV. Lymph node measures 5.55 mm x 7.95 mm. Left Lower Venous: No  evidence of acute deep vein thrombus visualized in the left lower extremity.  Additional Findings: Difficult study due to patient respirations. Holds breath while sleeping.  Imaging & Doppler Findings:  Right                 Compressible Thrombus        Flow Distal External Iliac                None   Spontaneous/Phasic CFV                       Yes        None   Spontaneous/Phasic FV Proximal                          None FV Mid                    Yes        None FV Distal                 Yes        None Popliteal                 Yes        None   Spontaneous/Phasic Peroneal                  Yes        None PTV                       Yes        None  Left                  Compress Thrombus        Flow Distal External Iliac            None   Spontaneous/Phasic CFV                     Yes      None   Spontaneous/Phasic PFV                     Yes      None FV Proximal             Yes      None   Spontaneous/Phasic FV Mid                  Yes      None FV Distal               Yes      None Popliteal               Yes      None   Spontaneous/Phasic Peroneal                Yes      None PTV                     Yes      None  [id number] Raiza Brown MD Electronically signed by [id number] Raiza Brown MD on 5/13/2025 at 4:18:15 PM  ** Final **     Transthoracic Echo (TTE) Limited  Result Date: 5/9/2025   [facility], [address] [place] [address]                Tel [phone] and Fax [phone] TRANSTHORACIC ECHOCARDIOGRAM REPORT  Patient Name:       KIRSTY Saenz Physician:    [id number] Smith Abdalal MD Study Date:         5/8/2025            Ordering Provider:    [id number] YURY DELA CRUZ MRN/PID:            [id number]            Fellow: Accession#:         [id number]        Nurse: Date of Birth/Age:  1970 / 55      Sonographer:          Ene Santos  RDCS                     years Gender assigned at  F                   Additional Staff: Birth: Height:             165.10 cm           Admit Date:           5/7/2025 Weight:             98.88 kg            Admission Status:     Inpatient - STAT BSA / BMI:          2.05 m2 / 36.28     Encounter#:           [id number]                     kg/m2 Blood Pressure:     104/61 mmHg         [address] Study Type:    TRANSTHORACIC ECHO (TTE) LIMITED Diagnosis/ICD: Ischemic cardiomyopathy-I25.5 Indication:    ICM, Hypotension, c/f RV overload CPT Code:      Echo Limited-51671; Doppler Limited-64775; Color Doppler-35526 Patient History: Pertinent History: Chest Pain, Previous DVT, Hyperlipidemia and Cardiomyopathy.                    NSTEMI, Thrombosis in peripherally inserted central catheter,                    Hepatic infarction, SVC syndrome, Sickle cell, Morbid                    obesity, Thromosis of SVC. Study Detail: The following Echo studies were performed: 2D, M-Mode, Doppler and               color flow. Technically challenging study due to prominent lung               artifact, patient lying in supine position and body habitus.               Definity used as a contrast agent for endocardial border               definition and agitated saline used as a contrast agent for               intraseptal flow evaluation. Total contrast used for this               procedure was 1 mL via IV push. Unable to obtain suprasternal               notch view.  PHYSICIAN INTERPRETATION: Left Ventricle: Left ventricular ejection fraction is normal, calculated by Nazario's biplane at 67%. There are no regional left ventricular wall motion abnormalities. The left ventricular cavity size is decreased. There is mildly increased septal and normal posterior left ventricular wall thickness. The interventricular septum is flattened in systole and diastole, consistent with right ventricular pressure and volume  overload. Left ventricular diastolic filling was not assessed. Left Atrium: The left atrial size was not well visualized. A bubble study using agitated saline was performed. Bubble study is negative. Right Ventricle: The right ventricle is severely enlarged. There is reduced right ventricular systolic function. Right Atrium: The right Enlarged. Aortic Valve: The aortic valve was not well visualized. Aortic valve regurgitation was not assessed. Mitral Valve: The mitral valve is normal in structure. Mitral valve regurgitation was not assessed. Tricuspid Valve: The tricuspid valve is structurally normal. There is trace tricuspid regurgitation. Pulmonic Valve: The pulmonic valve is not well visualized. The pulmonic valve regurgitation was not well visualized. Pericardium: There is no pericardial effusion noted. Aorta: The aortic root is normal. Systemic Veins: The inferior vena cava appears mildly dilated, with IVC inspiratory collapse less than 50%. In comparison to the previous echocardiogram(s): Compared with study dated 5/8/2025, no significant change.  CONCLUSIONS:  1. Poorly visualized anatomical structures due to suboptimal image quality.  2. Left ventricular ejection fraction is normal, calculated by Nazario's biplane at 67%.  3. Left ventricular cavity size is decreased.  4. Right ventricular volume and pressure overload.  5. There is reduced right ventricular systolic function.  6. Severely enlarged right ventricle.  7. The inferior vena cava appears mildly dilated, with IVC inspiratory collapse less than 50%. RECOMMENDATIONS: Utilizing an FDA cleared automated machine learning algorithm (EchoGo Heart Failure by [employer]), the analysis of the apical 4-chamber echocardiogram suggests the presence of heart failure with preserved ejection fraction (HFpEF)*. Clinical correlation looking for additional heart failure signs and symptoms is recommended, as a definite diagnosis of heart failure cannot be made by  imaging alone. *Per ACC/AHA/HFSA universal diagnosis of heart failure, HFpEF is defined as 1) signs and symptoms leading to clinical diagnosis of heart failure, 2) an ejection fraction of at least 50%, and 3) evidence of elevated intra-cardiac filling pressures by echocardiography, BNP elevation, or catheterization.  QUANTITATIVE DATA SUMMARY:  2D MEASUREMENTS:          Normal Ranges: LAs:             3.80 cm  (2.7-4.0cm) IVSd:            1.10 cm  (0.6-1.1cm) LVPWd:           0.70 cm  (0.6-1.1cm) LVIDd:           4.40 cm  (3.9-5.9cm) LVIDs:           1.80 cm LV Mass Index:   62 g/m2 LVEDV Index:     62 ml/m2 LV % FS          59.1 %  LEFT ATRIUM:                  Normal Ranges: LA Vol A4C:        53.9 ml    (22+/-6mL/m2) LA Vol A2C:        70.5 ml LA Vol BP:         62.6 ml LA Vol Index A4C:  26.3ml/m2 LA Vol Index A2C:  34.4 ml/m2 LA Vol Index BP:   30.5 ml/m2 LA Area A4C:       19.4 cm2 LA Area A2C:       22.5 cm2 LA Major Axis A4C: 5.9 cm LA Major Axis A2C: 6.1 cm  LV SYSTOLIC FUNCTION:                      Normal Ranges: EF-A4C View:    69 % (>=55%) EF-A2C View:    68 % EF-Biplane:     67 % LV EF Reported: 67 %  LV DIASTOLIC FUNCTION:             Normal Ranges: MV Peak E:             0.83 m/s    (0.7-1.2 m/s) MV Peak A:             0.73 m/s    (0.42-0.7 m/s) E/A Ratio:             1.14        (1.0-2.2) MV e'                  0.108 m/s   (>8.0) MV lateral e'          0.10 m/s MV medial e'           0.11 m/s MV A Dur:              135.00 msec E/e' Ratio:            7.69        (<8.0) MV DT:                 230 msec    (150-240 msec) PulmV Sys Jeremy:         58.30 cm/s PulmV Jose Jeremy:        56.10 cm/s PulmV S/D Jeremy:         1.00 PulmV A Revs Jeremy:      24.00 cm/s PulmV A Revs Dur:      127.00 msec  MITRAL VALVE:          Normal Ranges: MV DT:        230 msec (150-240msec)  RIGHT VENTRICLE: RV s' 0.11 m/s  TRICUSPID VALVE/RVSP:         Normal Ranges: IVC Diam:             2.12 cm  PULMONIC VALVE:          Normal  Ranges: PV Accel Time:  112 msec (>120ms) PV Max Jeremy:     0.9 m/s  (0.6-0.9m/s) PV Max PG:      3.2 mmHg  PULMONARY VEINS: PulmV A Revs Dur: 127.00 msec PulmV A Revs Jeremy: 24.00 cm/s PulmV Jose Jeremy:   56.10 cm/s PulmV S/D Jeremy:    1.00 PulmV Sys Jeremy:    58.30 cm/s  [id number] Smith Abdalla MD Electronically signed on 5/8/2025 at 10:24:04 AM  ** Final **     XR chest 1 view  Result Date: 5/9/2025  Interpreted By:  Osman Granados,  and Feliz Quach STUDY: XR CHEST 1 VIEW;  5/9/2025 9:33 am   INDICATION: Signs/Symptoms:hypoxia.   COMPARISON: Chest x-ray 05/07/2025 and chest, abdomen and pelvis CT scan 09/11/2024.   ACCESSION NUMBER(S): [id number]   ORDERING CLINICIAN: SHANNON TERRAZAS   FINDINGS: AP radiograph of the chest was provided.     CARDIOMEDIASTINAL SILHOUETTE: Cardiomediastinal silhouette is stable in size and configuration.   LUNGS: Interval resolution of focal right basilar opacity. Mild left basilar atelectasis with or without small left pleural effusion, similar to prior. No new focal consolidation. No definite pneumothorax visualized.   ABDOMEN: No remarkable upper abdominal findings.   BONES: No acute osseous changes.       1.  Interval resolution of focal right basilar airspace opacity. 2. Unchanged mild left basilar atelectasis with or without small left pleural effusion.   I personally reviewed the images/study and I agree with the findings as stated by resident physician Main Piper MD. This study was interpreted at [facility], [place], [place].   MACRO: None   Signed by: Osman Granados 5/9/2025 12:28 PM Dictation workstation:   [id number]    Transthoracic Echo (TTE) Limited  Result Date: 5/8/2025   [facility], [address] [place] [address]                Tel [phone] and Fax [phone] TRANSTHORACIC ECHOCARDIOGRAM REPORT  Patient Name:        [facility]          Letty Physician: Chely Abdalla                                                               MD Study Date:          5/8/2025             Ordering Provider: [id number] BILL MEJIAS                                                              HOMEIDA MRN/PID:             [id number]             Fellow: Accession#:          [id number]         Nurse: Date of Birth/Age:   1970 / 55 years Sonographer:       Fellow Exam Gender assigned at   F                    Additional Staff: Birth: Height:                                   Admit Date:        5/7/2025 Weight:                                   Admission Status:  Inpatient - Routine BSA / BMI:           m2 / kg/m2           Encounter#:        [id number] Study Type:    TRANSTHORACIC ECHO (TTE) LIMITED Diagnosis/ICD: Hypotension, unspecified-I95.9 Indication:    Hypotension CPT Code:      Echo Limited-45948; Color Doppler-07543  Study Detail: The following Echo studies were performed: 2D, M-Mode and color               flow.  PHYSICIAN INTERPRETATION: Left Ventricle: The left ventricle was not well visualized. The left ventricular ejection fraction could not be measured. The left ventricular cavity size is decreased. The interventricular septum is flattened in systole and diastole, consistent with right ventricular pressure and volume overload. Left ventricular diastolic filling was not assessed. Left Atrium: The left atrial size was not well visualized. Right Ventricle: The right ventricle is severely enlarged. There is reduced right ventricular systolic function. Right Atrium: The right atrial size was not well visualized. Aortic Valve: The aortic valve was not well visualized. Aortic valve regurgitation was not assessed. Mitral Valve: The mitral valve is normal in structure. There is trace mitral valve regurgitation. Tricuspid Valve: The tricuspid valve was not well visualized. Tricuspid regurgitation was not assessed. Pulmonic Valve: The pulmonic valve was not assessed. Pulmonic valve regurgitation was not assessed. Pericardium: Trivial pericardial  effusion. Aorta: The aortic root was not well visualized. Systemic Veins: The inferior vena cava appears mildly dilated, with IVC inspiratory collapse less than 50%. In comparison to the previous echocardiogram(s): Compared with study dated 9/11/2024, Poor technical quality limits comaprison, RV size and function may have worsened since prior exam, IVC is now dilated.  CONCLUSIONS:  1. The left ventricle was not well visualized. The left ventricular ejection fraction could not be measured.  2. Left ventricular diastolic filling was not assessed.  3. Left ventricular cavity size is decreased.  4. Right ventricular volume and pressure overload.  5. There is reduced right ventricular systolic function.  6. Severely enlarged right ventricle.  7. The inferior vena cava appears mildly dilated, with IVC inspiratory collapse less than 50%.  8. Compared with study dated 9/11/2024, Poor technical quality limits comaprison, RV size and function may have worsened since prior exam, IVC is now dilated. QUANTITATIVE DATA SUMMARY:  LV SYSTOLIC FUNCTION:                      Normal Ranges: LV EF Reported: 63 %  [id number] Smith Abdalla MD Electronically signed on 5/8/2025 at 10:19:31 AM  ** Final **     XR chest 1 view  Result Date: 5/7/2025  Interpreted By:  Momo Oconnell and Liu Scott STUDY: XR CHEST 1 VIEW;  5/7/2025 12:49 pm   INDICATION: Signs/Symptoms:Hypoxic and SOB r/o PNA.     COMPARISON: Chest x-ray 03/02/2025   ACCESSION NUMBER(S): [id number]   ORDERING CLINICIAN: PENG REARDON   FINDINGS: AP radiograph of the chest was provided.       CARDIOMEDIASTINAL SILHOUETTE: Cardiomediastinal silhouette is stable in size and configuration.   LUNGS: Slight interval increase in bibasilar opacities, particularly at the right lower lung. Blunting of left costophrenic angle, unchanged from prior. No definite pneumothorax.   ABDOMEN: No remarkable upper abdominal findings.   BONES: No acute osseous changes.       1.  Bibasilar atelectasis  "with right basilar opacity may represent early developing consolidation versus atelectasis. 2. Small left pleural effusion, similar to prior.   I personally reviewed the images/study and I agree with the findings as stated by resident physician Main Piper MD. This study was interpreted at [facility], [place], [place].   MACRO: None   Signed by: Momo Oconnell 5/7/2025 1:24 PM Dictation workstation:   [id number]    IR CVC nontunneled  Result Date: 4/23/2025  Interpreted By:  Gerry Recio, STUDY: IR CVC NONTUNNELED;  4/18/2025 10:35 am   INDICATION: Signs/Symptoms:History of sickle cell disease on chronic red cell exchange transfusion and need for central venous access.   ,D57.219 Sickle-cell/Hb-C disease with crisis, unspecified (Multi),G89.4 Chronic pain syndrome   COMPARISON: None.   ACCESSION NUMBER(S): [id number]   ORDERING CLINICIAN: MARIYA TRAN   TECHNIQUE: INTERVENTIONALIST(S): Gerry Recio MD   The history and physical exam pertinent to the procedure were reviewed and no updates were made.\"   CONSENT: The patient/patient's POA/next of kin was informed of the nature of the proposed procedure. The purposes, alternatives, risks, and benefits were explained and discussed. All questions were answered and consent was obtained.   RADIATION EXPOSURE: Fluoroscopy time: 0.3 min. Dose: 8.0 mGy.     SEDATION: Moderate conscious IV sedation services (supervision of administration, induction, and maintenance) were provided by the physician performing the procedure with intravenous fentanyl 50 mcg and versed 1 mg for 30 minutes. The physician was assisted by an independent trained observer, an interventional radiology nurse, in the continuous monitoring of patient level of consciousness and physiologic status.   MEDICATION/CONTRAST: No additional.   TIME OUT: A time out was performed immediately prior to procedure start with the interventional team, correctly identifying the patient name, " date of birth, MRN, procedure, anatomy (including marking of site and side), patient position, procedure consent form, relevant laboratory and imaging test results, antibiotic administration, safety precautions, and procedure-specific equipment needs.   COMPLICATIONS: No immediate adverse events identified.   FINDINGS: Sonographic evaluation of the right groin demonstrated patent right common femoral vein. This was targeted for access. Site prepped and draped in usual sterile fashion. 1% lidocaine for anesthesia. Small skin nick made followed by soft tissue dissection. 21 gauge ultrasound-guided micropuncture access to the right common femoral vein. Ultrasound image saved. Upsized using Seldinger technique to an 035 Amplatz wire followed by serial soft tissue dilatation followed by placement of a 30 cm 13 Moroc[place] Trialysis catheter. Catheter secured using non resorbable suture and adhesive dressing. Heparin dwell 1000 units/ml       1.  Uncomplicated and technically successful placement of an indwelling  right   common femoral vein 30 cm cm 13 Moroccan Trialysis catheter.   I was present for and/or performed the critical portions of the procedure and immediately available throughout the entire procedure.   I personally reviewed the images/study and I agree with the findings as stated. This study was interpreted at [facility], [place], [place].   MACRO: None   Signed by: Gerry Recio 4/23/2025 12:06 PM Dictation workstation:   [id number]          Assessment & Plan  Pneumonia due to infectious organism, unspecified laterality, unspecified part of lung    [facility] Integrative Medicine Symptom Management program offers multi-disciplinary supervised care of cancer patients using Integrative Modalities billed to insurance using NCCN and SIO/ASCO guideline-driven practices.  ESAS is obtained prior to and after each treatment by the practitioner       Pre- Post-   Wellbeing    10 10   Coping 10 10   Pain 8 6   Fatigue 8 7   Anxiety  10 7   Depression 8 8   Stress 9 7   Nausea 7 0            Sickle cell disease with acute on chronic pain:   pain related to vaso-occlusive crisis  Defer to primary team for adequate PO/IV pain regimen   Recommend integrative therapy modalities as pt allows:  -Acupuncture  -Acupressure, gua sha   -Gentle bodywork and stretching as tolerated -- completed with acupuncturist today, consent obtained and placed in pt's chart     -Art therapy - pt declined   -Music therapy - consult placed   -Chaplaincy - pt declined   -Pet therapy - pt declined            Nausea/Vomiting:  Intermittent nausea and vomiting related to opioids  -Defer to primary team recs for pharmacological management  -Recommend integrative medicine modalities as listed above        Altered Mood:  Anxiety and/or depression r/t health concerns  History of anxiety/depression  -Recommend integrative medicine modalities as listed above  Mindfulness              [place]: AMDtx, Calm, Headspace, Insight Timer  Guided Imagery  Meditation (15 min in the morning) - consider mindfulness (Mindfulness based Stress Reduction)   Apps such as CALM or Headspace  Deep breathing: Alternate nostril breathing and Deep abdominal breathing (5 min) in the morning  [facility] - Guided Meditation        Thank you for allowing us to participate in the care of this patient. Integrative Medicine Team will continue to follow as needed.  Please contact team with any questions or concerns.           GRISEL Palacios-CNP (available by [place])  [facility]  Inpatient Integrative Medicine      I spent 30 minutes in the care of this patient which included chart review, interviewing patient/family, discussion with primary team, coordination of care, and documentation.     Medical Decision Making was high level due to high complexity of problems, extensive data review, and high risk of  management/treatment.        [1] ascorbic acid, 500 mg, oral, Daily  atropine, 1 drop, Left Eye, Nightly  cetirizine, 10 mg, oral, Daily  folic acid, 1 mg, oral, Daily  furosemide, 20 mg, oral, Every other day  guaiFENesin, 600 mg, oral, BID  lidocaine, 1 patch, transdermal, Daily  metoprolol tartrate, 25 mg, oral, BID  oxygen, , inhalation, Continuous - Inhalation  polyethylene glycol, 17 g, oral, Daily  sennosides-docusate sodium, 1 tablet, oral, BID  warfarin, 5 mg, oral, Once per day on Sunday Monday Wednesday Thursday Friday Saturday  warfarin, 7.5 mg, oral, Every Tuesday  [2]    [3] PRN medications: albuterol, alteplase, benzocaine-menthol, cyclobenzaprine, diphenhydrAMINE, fluticasone, HYDROmorphone, ondansetron, oxyCODONE, oxygen, phenoL, sodium chloride

## 2025-05-15 NOTE — DISCHARGE SUMMARY
"Discharge Diagnosis  Pneumonia due to infectious organism, unspecified laterality, unspecified part of lung      Test Results Pending At Discharge  Pending Labs       No current pending labs.            Hospital Course  Senait Narvaez is a 55 y.o. 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 On arrival found to be hypoxic to 82% on RA, placed on 2L O2 via NC. Patient admitted for pain control. CXR on admit showing consolidation vs atelectasis. Started on Azithromycin (5/8-5/10) and Ceftriaxone (5/8-end date 5/13). Later that evening, transferred to the MICU 5/8-5/9 for hypotension and hypercanpic hypoxic respiratory failure that inproved with BIPAP, s/p exchange transfusion 5/8, and transferred back to [place] on 5/9. She remained inpatient while bridging back to warfarin and managing pain. INR at goal 5/14, heparin drip discontinued, continue Warfarin. RLE with mild swelling, BLE duplex negative for DVT 5/13. Kidney function also improved throughout hospital stay, likely pre-renal secondary to RBC exchange. On 5/15, patient stated that her pain was improved and she was agreeable to discharge. On the day of discharge, the patient reported feeling well and pain was controlled. Vitals and labs were stable. Apheresis line pulled prior to discharge.     FUV listed below     Cough drops and chloraseptic throat spray delivered to bedside prior to discharge       Pertinent Physical Exam At Time of Discharge  Physical Exam  Vitals reviewed.   Constitutional:       Appearance: Normal appearance.   HENT:      Head: Normocephalic and atraumatic.      Nose: Nose " normal.      Mouth/Throat:      Mouth: Mucous membranes are moist.      Pharynx: Oropharynx is clear.   Eyes:      Extraocular Movements: Extraocular movements intact.      Pupils: Pupils are equal, round, and reactive to light.   Cardiovascular:      Rate and Rhythm: Normal rate and regular rhythm.      Pulses: Normal pulses.      Heart sounds: Normal heart sounds.   Pulmonary:      Effort: Pulmonary effort is normal.      Breath sounds: Normal breath sounds.   Abdominal:      General: Bowel sounds are normal.      Palpations: Abdomen is soft.   Musculoskeletal:         General: Normal range of motion.   Skin:     General: Skin is warm.   Neurological:      General: No focal deficit present.      Mental Status: She is alert and oriented to person, place, and time. Mental status is at baseline.   Psychiatric:         Mood and Affect: Mood normal.         Behavior: Behavior normal.         Home Medications     Medication List      START taking these medications     benzocaine-menthol lozenge; Commonly known as: Cepastat Sore Throat;   Dissolve 1 lozenge in the mouth every 2 hours if needed for sore throat.   phenoL 1.4 % aerosol,spray; Commonly known as: Chloraseptic; Use 1 spray   in the mouth or throat every 2 hours if needed for sore throat.     CONTINUE taking these medications     albuterol 90 mcg/actuation inhaler; Inhale 2 puffs every 6 hours if   needed for wheezing.   ascorbic acid 500 mg chewable tablet; Commonly known as: Vitamin C   atropine 1 % ophthalmic solution; Administer 1 drop into the left eye   once daily at bedtime. Please continue to administer to Left eye until   seen at outpt clinic with ophthalmology this upcoming week   cyclobenzaprine 10 mg tablet; Commonly known as: Flexeril; Take 1 tablet   (10 mg) by mouth 3 times a day as needed for muscle spasms.   fluticasone 50 mcg/actuation nasal spray; Commonly known as: Flonase   folic acid 1 mg tablet; Commonly known as: Folvite   furosemide 20  mg tablet; Commonly known as: Lasix; Take 1 tablet (20 mg)   by mouth every other day.   loratadine 10 mg tablet; Commonly known as: Claritin   metoprolol tartrate 25 mg tablet; Commonly known as: Lopressor; Take 1   tablet (25 mg) by mouth 2 times a day.   multivitamin tablet   naloxone 4 mg/0.1 mL nasal spray; Commonly known as: Narcan; Administer   1 spray (4 mg) into affected nostril(s) if needed for opioid reversal. May   repeat every 2-3 minutes if needed, alternating nostrils, until medical   assistance becomes available.   ondansetron 4 mg tablet; Commonly known as: Zofran; Take 1 tablet (4 mg)   by mouth every 8 hours if needed for nausea or vomiting.   oxyCODONE 10 mg immediate release tablet; Commonly known as: Roxicodone;   Take 1 tablet (10 mg) by mouth every 4 hours if needed for severe pain (7   - 10) (pain).   oxygen gas therapy; Commonly known as: O2; Inhale 1 each continuously.   warfarin 5 mg tablet; Commonly known as: Coumadin; Take as directed. If   you are unsure how to take this medication, talk to your nurse or doctor.;   Original instructions: Take 1 tablet (5 mg) by mouth once daily in the   evening.   white petrolatum 41 % ointment; Commonly known as: Aquaphor; Apply 2   Applications topically 2 times a day. Apply vaseline to all eroded/denuded   areas. Mepilex transfer sheets may be used for areas of friction     STOP taking these medications     enoxaparin 100 mg/mL syringe; Commonly known as: Lovenox   traZODone 50 mg tablet; Commonly known as: Desyrel   triamcinolone 0.1 % cream; Commonly known as: Kenalog       Outpatient Follow-Up  Future Appointments   Date Time Provider Department [place]   5/21/2025 10:00 AM John Biggs MD [id number] [facility]   5/21/2025  1:30 PM GRISEL Mendez-CNP [id number] [facility]   5/30/2025 10:00 AM [facility] IR 1 [facility]   5/30/2025 11:00 AM APHERESIS CHAIR 15 [facility]   7/30/2025 10:30 AM [facility] MAMMO 4 CM[facility]AM [facility]    7/30/2025 11:00 AM [facility]   8/1/2025 10:00 AM Yocasta Oliva APRN-CNP SCCBrnON [facility]   8/25/2025 11:20 AM DO ANKUSH Nguy[address] [place] [address]       Assessment and plan as above discussed with attending physician Dr. Shellie Kerns, APRN-CNP

## 2025-05-15 NOTE — PROGRESS NOTES
Music Therapy Note    Senait Narvaez     Therapy Session  Referral Type: New referral this admission  Visit Type: Follow-up visit  Session Start Time: 1536  Session End Time: 1538  Intervention Delivery: In-person  Conflict of Service: Declined treatment               Treatment/Interventions       Post-assessment  Total Session Time (min): 2 minutes    Narrative  Assessment Detail: Patient presented awake and alert, sitting at EOB, displaying flat affect. Patient declined music intervention at this time.  Follow-up: MT to follow as needed/requested.    Education Documentation  No documentation found.

## 2025-05-19 ENCOUNTER — TELEPHONE (OUTPATIENT)
Dept: ADMISSION | Facility: HOSPITAL | Age: 55
End: 2025-05-19
Payer: COMMERCIAL

## 2025-05-19 ENCOUNTER — ANTICOAGULATION - WARFARIN VISIT (OUTPATIENT)
Dept: CARDIOLOGY | Facility: CLINIC | Age: 55
End: 2025-05-19
Payer: COMMERCIAL

## 2025-05-19 DIAGNOSIS — D57.00 SICKLE CELL CRISIS (MULTI): ICD-10-CM

## 2025-05-19 DIAGNOSIS — I26.99 RECURRENT PULMONARY EMBOLISM (MULTI): Primary | ICD-10-CM

## 2025-05-19 DIAGNOSIS — I82.4Z9 DEEP VEIN THROMBOSIS (DVT) OF DISTAL VEIN OF LOWER EXTREMITY, UNSPECIFIED CHRONICITY, UNSPECIFIED LATERALITY: ICD-10-CM

## 2025-05-19 DIAGNOSIS — I82.210 THROMBOSIS OF SUPERIOR VENA CAVA (MULTI): ICD-10-CM

## 2025-05-19 RX ORDER — OXYCODONE HYDROCHLORIDE 10 MG/1
10 TABLET ORAL EVERY 4 HOURS PRN
Qty: 75 TABLET | Refills: 0 | Status: SHIPPED | OUTPATIENT
Start: 2025-05-19

## 2025-05-19 NOTE — PROGRESS NOTES
Patient identification verified with 2 identifiers.    Location: [facility] Patient Self-Testing Program [phone]    Referring Physician:  Erich Whaley MD  Enrollment/ Re-enrollment date: 2025   INR Goal: 2.0-3.0  INR monitoring is per [facility] protocol.  Anticoagulation Medication: warfarin  Indication: Deep Vein Thrombosis (DVT)    Subjective   Bleeding signs/symptoms: No    Bruising: No   Major bleeding event: No  Thrombosis signs/symptoms: No  Thromboembolic event: No  Missed doses: No  Extra doses: No  Medication changes: No  Dietary changes: No  Change in health: No  Change in activity: No  Alcohol: No  Other concerns: No    Upcoming Procedures:  Does the Patient Have any upcoming procedures that require interruption in anticoagulation therapy? no  Does the patient require bridging? no      Anticoagulation Summary  As of 2025      INR goal:  2.0-3.0   TTR:  80.6% (1.1 y)   INR used for dosin.80 (2025)   Weekly warfarin total:  35 mg               Assessment/Plan   Therapeutic     1. New dose: no change    2. Next INR: 4 days which will be 1 week since last INR.    Education provided to patient during the visit:  Patient instructed to call in interim with questions, concerns and changes.   Patient educated on compliance with dosing, follow up appointments, and prescribed plan of care.

## 2025-05-19 NOTE — TELEPHONE ENCOUNTER
Senait Narvaez called the refill line for Oxycodone and Ondansetron . Requesting refills be sent to [facility] pharmacy; message sent to Sickle Cell team to submit.

## 2025-05-21 ENCOUNTER — APPOINTMENT (OUTPATIENT)
Dept: OPHTHALMOLOGY | Facility: CLINIC | Age: 55
End: 2025-05-21
Payer: COMMERCIAL

## 2025-05-21 ENCOUNTER — TELEPHONE (OUTPATIENT)
Dept: HEMATOLOGY/ONCOLOGY | Facility: HOSPITAL | Age: 55
End: 2025-05-21

## 2025-05-21 NOTE — TELEPHONE ENCOUNTER
Patient would like a call back with questions on why she is scheduled for Cell Exchange on 05/30/25 and she just completed an exchange during recent inpatient stay

## 2025-05-23 ENCOUNTER — ANTICOAGULATION - WARFARIN VISIT (OUTPATIENT)
Dept: CARDIOLOGY | Facility: CLINIC | Age: 55
End: 2025-05-23
Payer: COMMERCIAL

## 2025-05-23 DIAGNOSIS — I82.4Z9 DEEP VEIN THROMBOSIS (DVT) OF DISTAL VEIN OF LOWER EXTREMITY, UNSPECIFIED CHRONICITY, UNSPECIFIED LATERALITY: ICD-10-CM

## 2025-05-23 DIAGNOSIS — I26.99 RECURRENT PULMONARY EMBOLISM (MULTI): Primary | ICD-10-CM

## 2025-05-23 DIAGNOSIS — I82.210 THROMBOSIS OF SUPERIOR VENA CAVA (MULTI): ICD-10-CM

## 2025-05-23 NOTE — PROGRESS NOTES
Patient identification verified with 2 identifiers.    Location: [facility] Patient Self-Testing Program [phone]    Referring Physician: Erich Whaley MD  Enrollment/ Re-enrollment date: 2025   INR Goal: 2.0-3.0  INR monitoring is per [facility] protocol.  Anticoagulation Medication: warfarin  Indication: Deep Vein Thrombosis (DVT)    Subjective   Bleeding signs/symptoms: No  Bruising: No   Major bleeding event: No  Thrombosis signs/symptoms: No  Thromboembolic event: No  Missed doses: No  Extra doses: No  Medication changes: No  Dietary changes: No  Change in health: No  Change in activity: No  Alcohol: No  Other concerns: No    Upcoming Procedures:  Does the Patient Have any upcoming procedures that require interruption in anticoagulation therapy? no  Does the patient require bridging? no      Anticoagulation Summary  As of 2025      INR goal:  2.0-3.0   TTR:  80.9% (1.1 y)   INR used for dosin.60 (2025)   Weekly warfarin total:  35 mg               Assessment/Plan   Therapeutic     1. New dose: no change    2. Next INR: 1 week    Pt due to self-test today but no faxed INR result yet received. Called patient and pt identification verified with 2 pt identifiers. Pt states she did self-test today but had not yet called it in to [employer] but would do so right away. Previous INR was therapeutic at 2.8. Today, self test reveals INR of 2.6 which is therapeutic for range of 2-3. Current incoming dose schedule reviewed with pt and read back correctly to me. Pt denies complications related to ACT therapy. Pt denies changes in diet, medications, social habits or general health. Will maintain dose and retest in 1 week. Outgoing dose schedule reviewed with pt and read back correctly to me. Pt instructed to call in interim with questions, concerns and changes.    Education provided to patient during the visit:  Patient instructed to call in interim with questions, concerns and changes.   Patient educated on  interactions between medications and warfarin.   Patient educated on dietary consistency in vitamin k consumption.   Patient educated on affects of alcohol consumption while taking warfarin.   Patient educated on signs of bleeding/clotting.   Patient educated on compliance with dosing, follow up appointments, and prescribed plan of care.

## 2025-05-27 NOTE — DOCUMENTATION CLARIFICATION NOTE
"    PATIENT:               KIRSTY MARSHALL  ACCT #:                  [id number]  MRN:                       [id number]  :                       1970  ADMIT DATE:       2025 6:41 PM  DISCH DATE:        5/15/2025 1:58 PM  RESPONDING PROVIDER #:        [id number]          PROVIDER RESPONSE TEXT:    Hypotension only without shock    CDI QUERY TEXT:    Clarification      Instruction:    Based on your assessment of the patient and the clinical information, please provide the requested documentation by clicking on the appropriate radio button and enter any additional information if prompted.    Question: Is there a diagnosis associated with the clinical information    When answering this query, please exercise your independent professional judgment. The fact that a question is being asked, does not imply that any particular answer is desired or expected.    The patient's clinical indicators include:  Clinical Information: From discharge summary- PT is a 55 y.o. female with  PMH Hb SC SCD w/ regular exchanges who presented to [facility]  with c/o pain \"all over\" despite home pain regimen. On arrival found to be hypoxic to 82% on RA, placed on 2L O2 via NC. Patient admitted for pain control.    Clinical Indicators:  From Significant Event note on 25- \"Rapid Response team at bedside for hypotension. Of note, pt has Hx of sickle cell disease with regular exchange transfusions and pulmonary HTN, currently admitted for PNA. Prior to arrival, labs drawn including VBG (See Media for photo; pH: 7.21 CO2: 69 Lactate: 1)\"    From H&P-  \"hypotension -improved  sepsis vs obstructive vs cardiogenic iso R heart failure  Lactate normal during rapid  \"    From consult on 25-  \"CHARLES on no CKD, oliguric, Stage III  Baseline creatinine: 1.0  Etiology: Sepsis, hypovolemia \"    From discharge summary- \"transferred to the MICU - for hypotension and hypercanpic hypoxic respiratory failure that inproved with BIPAP, s/p exchange " "transfusion 5/8, and transferred back to [place] on 5/9\"    WBC's on 5/7/25 at 1059-11.7  5/8/25 at 014-13.8    Vitals on 5/7/25 at 1848- /61, Temp 36.3, HR 78. RR 16, Pulse ox 94%  Vitals on 5/8/25 at 0015- BP 79/51, Temp 35.8, HR 63, RR 16, Pulse ox 98 %        Treatment: 500 mL Sodium Chloride Bolus, Bipap, exchange transfusion      Risk Factors: Hb SC, hypercanpic hypoxic respiratory failure, R heart failure, HFpEF, pHTN, Pneumonia  Options provided:  -- Cardiogenic shock  -- Septic shock  -- Hypovolemic shock  -- Distributive shock  -- Hypotension only without shock  -- Other - I will add my own diagnosis  -- Refer to Clinical Documentation Reviewer    Query created by: Nohemy Hernandez on 5/20/2025 2:21 PM      Electronically signed by:  ANGELA RECINOS-CNP 5/27/2025 7:08 AM          "

## 2025-05-28 ENCOUNTER — TELEPHONE (OUTPATIENT)
Dept: HEMATOLOGY/ONCOLOGY | Facility: HOSPITAL | Age: 55
End: 2025-05-28
Payer: COMMERCIAL

## 2025-05-28 NOTE — TELEPHONE ENCOUNTER
Call made to pt to inform of full exchange appt being 3 week instead of 4 weeks. Pt understanding of change.

## 2025-05-29 ENCOUNTER — LAB (OUTPATIENT)
Dept: LAB | Facility: HOSPITAL | Age: 55
End: 2025-05-29
Payer: COMMERCIAL

## 2025-05-29 DIAGNOSIS — D57.1 SICKLE CELL DISEASE WITHOUT CRISIS (MULTI): ICD-10-CM

## 2025-05-29 DIAGNOSIS — D57.00 SICKLE CELL CRISIS (MULTI): ICD-10-CM

## 2025-05-29 DIAGNOSIS — D57.1 SICKLE CELL DISEASE WITHOUT CRISIS (MULTI): Primary | ICD-10-CM

## 2025-05-29 LAB
ABO GROUP (TYPE) IN BLOOD: NORMAL
ALBUMIN SERPL BCP-MCNC: 3.4 G/DL (ref 3.4–5)
ALP SERPL-CCNC: 131 U/L (ref 33–110)
ALT SERPL W P-5'-P-CCNC: 6 U/L (ref 7–45)
ANION GAP SERPL CALC-SCNC: 10 MMOL/L (ref 10–20)
ANTIBODY SCREEN: NORMAL
AST SERPL W P-5'-P-CCNC: 10 U/L (ref 9–39)
BASOPHILS # BLD AUTO: 0.02 X10*3/UL (ref 0–0.1)
BASOPHILS NFR BLD AUTO: 0.2 %
BILIRUB SERPL-MCNC: 1.4 MG/DL (ref 0–1.2)
BUN SERPL-MCNC: 12 MG/DL (ref 6–23)
CA-I BLD-SCNC: 1.16 MMOL/L (ref 1.1–1.33)
CALCIUM SERPL-MCNC: 8.9 MG/DL (ref 8.6–10.3)
CHLORIDE SERPL-SCNC: 101 MMOL/L (ref 98–107)
CO2 SERPL-SCNC: 30 MMOL/L (ref 21–32)
CREAT SERPL-MCNC: 1.01 MG/DL (ref 0.5–1.05)
EGFRCR SERPLBLD CKD-EPI 2021: 65 ML/MIN/1.73M*2
EOSINOPHIL # BLD AUTO: 0.17 X10*3/UL (ref 0–0.7)
EOSINOPHIL NFR BLD AUTO: 1.8 %
ERYTHROCYTE [DISTWIDTH] IN BLOOD BY AUTOMATED COUNT: 29 % (ref 11.5–14.5)
FERRITIN SERPL-MCNC: 49 NG/ML (ref 8–150)
GIANT PLATELETS BLD QL SMEAR: NORMAL
GLUCOSE SERPL-MCNC: 96 MG/DL (ref 74–99)
HCT VFR BLD AUTO: 28.2 % (ref 36–46)
HGB BLD-MCNC: 9 G/DL (ref 12–16)
HGB RETIC QN: 19 PG (ref 28–38)
HOWELL-JOLLY BOD BLD QL SMEAR: PRESENT
HYPOCHROMIA BLD QL SMEAR: NORMAL
IMM GRANULOCYTES # BLD AUTO: 0.03 X10*3/UL (ref 0–0.7)
IMM GRANULOCYTES NFR BLD AUTO: 0.3 % (ref 0–0.9)
IMMATURE RETIC FRACTION: 12.8 %
LDH SERPL L TO P-CCNC: 164 U/L (ref 84–246)
LYMPHOCYTES # BLD AUTO: 0.78 X10*3/UL (ref 1.2–4.8)
LYMPHOCYTES NFR BLD AUTO: 8.3 %
MCH RBC QN AUTO: 25.3 PG (ref 26–34)
MCHC RBC AUTO-ENTMCNC: 31.9 G/DL (ref 32–36)
MCV RBC AUTO: 79 FL (ref 80–100)
MONOCYTES # BLD AUTO: 0.58 X10*3/UL (ref 0.1–1)
MONOCYTES NFR BLD AUTO: 6.2 %
NEUTROPHILS # BLD AUTO: 7.85 X10*3/UL (ref 1.2–7.7)
NEUTROPHILS NFR BLD AUTO: 83.2 %
NRBC BLD-RTO: 10.6 /100 WBCS (ref 0–0)
PAPPENHEIMER BOD BLD QL SMEAR: PRESENT
PLATELET # BLD AUTO: 391 X10*3/UL (ref 150–450)
POTASSIUM SERPL-SCNC: 4.2 MMOL/L (ref 3.5–5.3)
PROT SERPL-MCNC: 7.2 G/DL (ref 6.4–8.2)
RBC # BLD AUTO: 3.56 X10*6/UL (ref 4–5.2)
RBC MORPH BLD: NORMAL
RETICS #: 0.27 X10*6/UL (ref 0.02–0.08)
RETICS/RBC NFR AUTO: 7.6 % (ref 0.5–2)
RH FACTOR (ANTIGEN D): NORMAL
SCHISTOCYTES BLD QL SMEAR: NORMAL
SODIUM SERPL-SCNC: 137 MMOL/L (ref 136–145)
TARGETS BLD QL SMEAR: NORMAL
WBC # BLD AUTO: 9.4 X10*3/UL (ref 4.4–11.3)

## 2025-05-29 PROCEDURE — 83021 HEMOGLOBIN CHROMOTOGRAPHY: CPT

## 2025-05-29 PROCEDURE — 86901 BLOOD TYPING SEROLOGIC RH(D): CPT

## 2025-05-29 PROCEDURE — 83020 HEMOGLOBIN ELECTROPHORESIS: CPT | Performed by: PATHOLOGY

## 2025-05-29 PROCEDURE — 85045 AUTOMATED RETICULOCYTE COUNT: CPT

## 2025-05-29 PROCEDURE — 86902 BLOOD TYPE ANTIGEN DONOR EA: CPT

## 2025-05-29 PROCEDURE — 84075 ASSAY ALKALINE PHOSPHATASE: CPT

## 2025-05-29 PROCEDURE — 82330 ASSAY OF CALCIUM: CPT

## 2025-05-29 PROCEDURE — 86922 COMPATIBILITY TEST ANTIGLOB: CPT

## 2025-05-29 PROCEDURE — 36415 COLL VENOUS BLD VENIPUNCTURE: CPT

## 2025-05-29 PROCEDURE — 85025 COMPLETE CBC W/AUTO DIFF WBC: CPT

## 2025-05-29 PROCEDURE — 82728 ASSAY OF FERRITIN: CPT

## 2025-05-29 PROCEDURE — 83615 LACTATE (LD) (LDH) ENZYME: CPT

## 2025-05-29 PROCEDURE — 85660 RBC SICKLE CELL TEST: CPT

## 2025-05-30 ENCOUNTER — HOSPITAL ENCOUNTER (OUTPATIENT)
Dept: OTHER | Facility: HOSPITAL | Age: 55
Discharge: HOME | End: 2025-05-30
Payer: COMMERCIAL

## 2025-05-30 ENCOUNTER — APPOINTMENT (OUTPATIENT)
Dept: RADIOLOGY | Facility: HOSPITAL | Age: 55
End: 2025-05-30
Payer: COMMERCIAL

## 2025-05-30 ENCOUNTER — ANTICOAGULATION - WARFARIN VISIT (OUTPATIENT)
Dept: CARDIOLOGY | Facility: CLINIC | Age: 55
End: 2025-05-30
Payer: COMMERCIAL

## 2025-05-30 DIAGNOSIS — I82.4Z9 DEEP VEIN THROMBOSIS (DVT) OF DISTAL VEIN OF LOWER EXTREMITY, UNSPECIFIED CHRONICITY, UNSPECIFIED LATERALITY: ICD-10-CM

## 2025-05-30 DIAGNOSIS — I26.99 RECURRENT PULMONARY EMBOLISM (MULTI): Primary | ICD-10-CM

## 2025-05-30 DIAGNOSIS — I82.210 THROMBOSIS OF SUPERIOR VENA CAVA (MULTI): ICD-10-CM

## 2025-05-30 LAB
BLOOD EXPIRATION DATE: NORMAL
DISPENSE STATUS: NORMAL
HEMOGLOBIN A2: 3.2 % (ref 2–3.5)
HEMOGLOBIN A: 54.4 % (ref 95.8–98)
HEMOGLOBIN C: 21 %
HEMOGLOBIN F: 0.3 % (ref 0–2)
HEMOGLOBIN IDENTIFICATION INTERPRETATION: ABNORMAL
HEMOGLOBIN S: 21.1 %
PATH REVIEW-HGB IDENTIFICATION: ABNORMAL
PRODUCT BLOOD TYPE: 1700
PRODUCT CODE: NORMAL
UNIT ABO: NORMAL
UNIT NUMBER: NORMAL
UNIT RH: NORMAL
UNIT VOLUME: 272
UNIT VOLUME: 277
UNIT VOLUME: 285
UNIT VOLUME: 297
XM INTEP: NORMAL

## 2025-05-30 RX ORDER — DIPHENHYDRAMINE HCL 25 MG
25 CAPSULE ORAL ONCE
Status: DISCONTINUED | OUTPATIENT
Start: 2025-05-30 | End: 2025-05-31 | Stop reason: HOSPADM

## 2025-05-30 RX ORDER — ACETAMINOPHEN 325 MG/1
650 TABLET ORAL ONCE
Status: DISCONTINUED | OUTPATIENT
Start: 2025-05-30 | End: 2025-05-31 | Stop reason: HOSPADM

## 2025-05-30 RX ORDER — SODIUM CHLORIDE 0.9 % (FLUSH) 0.9 %
10 SYRINGE (ML) INJECTION ONCE
Status: DISCONTINUED | OUTPATIENT
Start: 2025-05-30 | End: 2025-05-31 | Stop reason: HOSPADM

## 2025-05-30 RX ORDER — DIPHENHYDRAMINE HYDROCHLORIDE 50 MG/ML
25 INJECTION, SOLUTION INTRAMUSCULAR; INTRAVENOUS EVERY 5 MIN PRN
Status: DISCONTINUED | OUTPATIENT
Start: 2025-05-30 | End: 2025-05-31 | Stop reason: HOSPADM

## 2025-05-30 RX ORDER — CALCIUM CARBONATE 200(500)MG
1500 TABLET,CHEWABLE ORAL EVERY 5 MIN PRN
Status: DISCONTINUED | OUTPATIENT
Start: 2025-05-30 | End: 2025-05-31 | Stop reason: HOSPADM

## 2025-06-02 ENCOUNTER — TELEPHONE (OUTPATIENT)
Dept: ADMISSION | Facility: HOSPITAL | Age: 55
End: 2025-06-02
Payer: COMMERCIAL

## 2025-06-02 RX ORDER — ONDANSETRON 4 MG/1
4 TABLET, FILM COATED ORAL EVERY 8 HOURS PRN
Qty: 20 TABLET | Refills: 1 | Status: SHIPPED | OUTPATIENT
Start: 2025-06-02

## 2025-06-02 NOTE — TELEPHONE ENCOUNTER
Pt is requesting refills of oxycodone 10mg q4 prn, #75 and ondansetron 4mg q8 prn, #20.  Preferred pharmacy is [facility] in [place].

## 2025-06-03 ENCOUNTER — TELEPHONE (OUTPATIENT)
Dept: HEMATOLOGY/ONCOLOGY | Facility: HOSPITAL | Age: 55
End: 2025-06-03

## 2025-06-04 ENCOUNTER — ANTICOAGULATION - WARFARIN VISIT (OUTPATIENT)
Dept: CARDIOLOGY | Facility: HOSPITAL | Age: 55
End: 2025-06-04
Payer: COMMERCIAL

## 2025-06-04 DIAGNOSIS — I82.4Z9 DEEP VEIN THROMBOSIS (DVT) OF DISTAL VEIN OF LOWER EXTREMITY, UNSPECIFIED CHRONICITY, UNSPECIFIED LATERALITY: ICD-10-CM

## 2025-06-04 DIAGNOSIS — I26.99 RECURRENT PULMONARY EMBOLISM (MULTI): Primary | ICD-10-CM

## 2025-06-04 DIAGNOSIS — I82.210 THROMBOSIS OF SUPERIOR VENA CAVA (MULTI): ICD-10-CM

## 2025-06-04 RX ORDER — OXYCODONE HYDROCHLORIDE 10 MG/1
10 TABLET ORAL EVERY 4 HOURS PRN
Qty: 75 TABLET | Refills: 0 | Status: SHIPPED | OUTPATIENT
Start: 2025-06-04

## 2025-06-04 NOTE — PROGRESS NOTES
Patient identification verified with 2 identifiers.    Location: [facility] Patient Self-Testing Program [phone]     Referring Physician: Erich Whaley MD  Enrollment/ Re-enrollment date: 2025   INR Goal: 2.0-3.0  INR monitoring is per [facility] protocol.  Anticoagulation Medication: warfarin  Indication: Deep Vein Thrombosis (DVT)    Subjective   Bleeding signs/symptoms: No    Bruising: No   Major bleeding event: No  Thrombosis signs/symptoms: No  Thromboembolic event: No  Missed doses: No  Extra doses: No  Medication changes: No  Dietary changes: No  Change in health: Yes  pt will be getting apharesis every 3 weeks. SHe thinks she will be holding warfarin for a few days prior but she doesnt know when it is going to be scheduled  Change in activity: No  Alcohol: No  Other concerns: No    Upcoming Procedures:  Does the Patient Have any upcoming procedures that require interruption in anticoagulation therapy? yes  Does the patient require bridging? no      Anticoagulation Summary  As of 2025      INR goal:  2.0-3.0   TTR:  81.5% (1.1 y)   INR used for dosin.00 (2025)   Weekly warfarin total:  35 mg               Assessment/Plan   Therapeutic     1. New dose: no change    2. Next INR: 2 weeks      Education provided to patient during the visit:  Patient instructed to call in interim with questions, concerns and changes.   Patient educated on compliance with dosing, follow up appointments, and prescribed plan of care.

## 2025-06-10 ENCOUNTER — LAB (OUTPATIENT)
Dept: LAB | Facility: HOSPITAL | Age: 55
End: 2025-06-10
Payer: COMMERCIAL

## 2025-06-10 DIAGNOSIS — D57.00: Primary | ICD-10-CM

## 2025-06-10 DIAGNOSIS — D57.01 SICKLE CELL CRISIS ACUTE CHEST SYNDROME (MULTI): ICD-10-CM

## 2025-06-10 DIAGNOSIS — D57.1 SICKLE CELL DISEASE WITHOUT CRISIS (MULTI): ICD-10-CM

## 2025-06-10 LAB
ABO GROUP (TYPE) IN BLOOD: NORMAL
ALBUMIN SERPL BCP-MCNC: 3.5 G/DL (ref 3.4–5)
ALP SERPL-CCNC: 116 U/L (ref 33–110)
ALT SERPL W P-5'-P-CCNC: 6 U/L (ref 7–45)
ANION GAP SERPL CALC-SCNC: 9 MMOL/L (ref 10–20)
ANTIBODY SCREEN: NORMAL
AST SERPL W P-5'-P-CCNC: 9 U/L (ref 9–39)
BASOPHILS # BLD AUTO: 0.03 X10*3/UL (ref 0–0.1)
BASOPHILS NFR BLD AUTO: 0.3 %
BILIRUB SERPL-MCNC: 1.3 MG/DL (ref 0–1.2)
BUN SERPL-MCNC: 11 MG/DL (ref 6–23)
CA-I BLD-SCNC: 1.18 MMOL/L (ref 1.1–1.33)
CALCIUM SERPL-MCNC: 9.1 MG/DL (ref 8.6–10.3)
CHLORIDE SERPL-SCNC: 104 MMOL/L (ref 98–107)
CO2 SERPL-SCNC: 31 MMOL/L (ref 21–32)
CREAT SERPL-MCNC: 1.22 MG/DL (ref 0.5–1.05)
EGFRCR SERPLBLD CKD-EPI 2021: 52 ML/MIN/1.73M*2
EOSINOPHIL # BLD AUTO: 0.05 X10*3/UL (ref 0–0.7)
EOSINOPHIL NFR BLD AUTO: 0.4 %
ERYTHROCYTE [DISTWIDTH] IN BLOOD BY AUTOMATED COUNT: 31.4 % (ref 11.5–14.5)
FERRITIN SERPL-MCNC: 23 NG/ML (ref 8–150)
GIANT PLATELETS BLD QL SMEAR: NORMAL
GLUCOSE SERPL-MCNC: 93 MG/DL (ref 74–99)
HCT VFR BLD AUTO: 27.9 % (ref 36–46)
HGB BLD-MCNC: 8.9 G/DL (ref 12–16)
HGB RETIC QN: 18 PG (ref 28–38)
HYPOCHROMIA BLD QL SMEAR: NORMAL
IMM GRANULOCYTES # BLD AUTO: 0.03 X10*3/UL (ref 0–0.7)
IMM GRANULOCYTES NFR BLD AUTO: 0.3 % (ref 0–0.9)
IMMATURE RETIC FRACTION: 9.5 %
LDH SERPL L TO P-CCNC: 137 U/L (ref 84–246)
LYMPHOCYTES # BLD AUTO: 1.33 X10*3/UL (ref 1.2–4.8)
LYMPHOCYTES NFR BLD AUTO: 11.5 %
MCH RBC QN AUTO: 23.8 PG (ref 26–34)
MCHC RBC AUTO-ENTMCNC: 31.9 G/DL (ref 32–36)
MCV RBC AUTO: 75 FL (ref 80–100)
MONOCYTES # BLD AUTO: 0.74 X10*3/UL (ref 0.1–1)
MONOCYTES NFR BLD AUTO: 6.4 %
NEUTROPHILS # BLD AUTO: 9.41 X10*3/UL (ref 1.2–7.7)
NEUTROPHILS NFR BLD AUTO: 81.1 %
NRBC BLD-RTO: 7.8 /100 WBCS (ref 0–0)
PAPPENHEIMER BOD BLD QL SMEAR: PRESENT
PLATELET # BLD AUTO: 320 X10*3/UL (ref 150–450)
PLATELET CLUMP BLD QL SMEAR: PRESENT
POLYCHROMASIA BLD QL SMEAR: NORMAL
POTASSIUM SERPL-SCNC: 3.9 MMOL/L (ref 3.5–5.3)
PROT SERPL-MCNC: 6.9 G/DL (ref 6.4–8.2)
RBC # BLD AUTO: 3.74 X10*6/UL (ref 4–5.2)
RBC MORPH BLD: NORMAL
RETICS #: 0.28 X10*6/UL (ref 0.02–0.08)
RETICS/RBC NFR AUTO: 7.6 % (ref 0.5–2)
RH FACTOR (ANTIGEN D): NORMAL
SCHISTOCYTES BLD QL SMEAR: NORMAL
SODIUM SERPL-SCNC: 140 MMOL/L (ref 136–145)
TARGETS BLD QL SMEAR: NORMAL
WBC # BLD AUTO: 11.6 X10*3/UL (ref 4.4–11.3)

## 2025-06-10 PROCEDURE — 82330 ASSAY OF CALCIUM: CPT

## 2025-06-10 PROCEDURE — 83020 HEMOGLOBIN ELECTROPHORESIS: CPT | Performed by: PATHOLOGY

## 2025-06-10 PROCEDURE — 85045 AUTOMATED RETICULOCYTE COUNT: CPT

## 2025-06-10 PROCEDURE — 83021 HEMOGLOBIN CHROMOTOGRAPHY: CPT

## 2025-06-10 PROCEDURE — 83615 LACTATE (LD) (LDH) ENZYME: CPT

## 2025-06-10 PROCEDURE — 85025 COMPLETE CBC W/AUTO DIFF WBC: CPT

## 2025-06-10 PROCEDURE — 86901 BLOOD TYPING SEROLOGIC RH(D): CPT

## 2025-06-10 PROCEDURE — 36415 COLL VENOUS BLD VENIPUNCTURE: CPT

## 2025-06-10 PROCEDURE — 80053 COMPREHEN METABOLIC PANEL: CPT

## 2025-06-10 PROCEDURE — 82728 ASSAY OF FERRITIN: CPT

## 2025-06-10 RX ORDER — ACETAMINOPHEN 325 MG/1
650 TABLET ORAL ONCE
OUTPATIENT
Start: 2025-06-11

## 2025-06-10 RX ORDER — DIPHENHYDRAMINE HYDROCHLORIDE 50 MG/ML
50 INJECTION, SOLUTION INTRAMUSCULAR; INTRAVENOUS AS NEEDED
OUTPATIENT
Start: 2025-06-11

## 2025-06-10 RX ORDER — ALBUTEROL SULFATE 0.83 MG/ML
3 SOLUTION RESPIRATORY (INHALATION) AS NEEDED
OUTPATIENT
Start: 2025-06-11

## 2025-06-10 RX ORDER — FAMOTIDINE 10 MG/ML
20 INJECTION, SOLUTION INTRAVENOUS ONCE AS NEEDED
OUTPATIENT
Start: 2025-06-11

## 2025-06-10 RX ORDER — EPINEPHRINE 0.3 MG/.3ML
0.3 INJECTION SUBCUTANEOUS EVERY 5 MIN PRN
OUTPATIENT
Start: 2025-06-11

## 2025-06-10 RX ORDER — DIPHENHYDRAMINE HCL 25 MG
25 CAPSULE ORAL ONCE
OUTPATIENT
Start: 2025-06-11

## 2025-06-11 ENCOUNTER — HOSPITAL ENCOUNTER (OUTPATIENT)
Dept: RADIOLOGY | Facility: HOSPITAL | Age: 55
Discharge: HOME | End: 2025-06-11
Payer: COMMERCIAL

## 2025-06-11 ENCOUNTER — APPOINTMENT (OUTPATIENT)
Dept: OPHTHALMOLOGY | Facility: CLINIC | Age: 55
End: 2025-06-11
Payer: COMMERCIAL

## 2025-06-11 DIAGNOSIS — D57.1 SICKLE CELL DISEASE WITHOUT CRISIS (MULTI): ICD-10-CM

## 2025-06-11 DIAGNOSIS — D57.1 SICKLE CELL DISEASE WITHOUT CRISIS (MULTI): Primary | ICD-10-CM

## 2025-06-13 LAB
HEMOGLOBIN A2: 3.1 % (ref 2–3.5)
HEMOGLOBIN A: 47.1 % (ref 95.8–98)
HEMOGLOBIN C: 24.4 %
HEMOGLOBIN F: 0.3 % (ref 0–2)
HEMOGLOBIN IDENTIFICATION INTERPRETATION: ABNORMAL
HEMOGLOBIN S: 25.1 %
PATH REVIEW-HGB IDENTIFICATION: ABNORMAL

## 2025-06-16 ENCOUNTER — NURSE TRIAGE (OUTPATIENT)
Dept: ADMISSION | Facility: HOSPITAL | Age: 55
End: 2025-06-16
Payer: COMMERCIAL

## 2025-06-16 ENCOUNTER — TELEPHONE (OUTPATIENT)
Dept: HEMATOLOGY/ONCOLOGY | Facility: HOSPITAL | Age: 55
End: 2025-06-16
Payer: COMMERCIAL

## 2025-06-16 DIAGNOSIS — D57.00 SICKLE CELL CRISIS (MULTI): ICD-10-CM

## 2025-06-16 NOTE — TELEPHONE ENCOUNTER
Called patient to discuss exchange transfusion. She is feeling unwell and dizzy. Recommended that she comes to the ED to be evaluated and we will plan on exchange transfusion when she is admitted to hospital.

## 2025-06-16 NOTE — TELEPHONE ENCOUNTER
Pt called reporting intermittent dizziness.  Dizzy spells come on gradually and last for about a minute.  She does endorse a fall a few weeks ago but no falls or syncope since.  She denies melena, stroke symptoms or concern for dehydration.  She is not having fevers, chest pain or difficulty breathing.  She believes dizziness is because she needs an exchange transfusion and asks about plan to reschedule appt from last week?    She also requests a refill of oxycodone 10mg q4 prn, #75.  Preferred pharmacy is [facility] at [address]

## 2025-06-17 ENCOUNTER — APPOINTMENT (OUTPATIENT)
Dept: RADIOLOGY | Facility: HOSPITAL | Age: 55
DRG: 812 | End: 2025-06-17
Payer: COMMERCIAL

## 2025-06-17 ENCOUNTER — CLINICAL SUPPORT (OUTPATIENT)
Dept: EMERGENCY MEDICINE | Facility: HOSPITAL | Age: 55
DRG: 812 | End: 2025-06-17
Payer: COMMERCIAL

## 2025-06-17 ENCOUNTER — HOSPITAL ENCOUNTER (INPATIENT)
Facility: HOSPITAL | Age: 55
DRG: 812 | End: 2025-06-17
Attending: STUDENT IN AN ORGANIZED HEALTH CARE EDUCATION/TRAINING PROGRAM
Payer: COMMERCIAL

## 2025-06-17 ENCOUNTER — TELEPHONE (OUTPATIENT)
Dept: HEMATOLOGY/ONCOLOGY | Facility: HOSPITAL | Age: 55
End: 2025-06-17
Payer: COMMERCIAL

## 2025-06-17 ENCOUNTER — TELEPHONE (OUTPATIENT)
Dept: ADMISSION | Facility: HOSPITAL | Age: 55
End: 2025-06-17
Payer: COMMERCIAL

## 2025-06-17 DIAGNOSIS — R06.02 SHORTNESS OF BREATH: ICD-10-CM

## 2025-06-17 DIAGNOSIS — D57.00 SICKLE CELL PAIN CRISIS (MULTI): Primary | ICD-10-CM

## 2025-06-17 DIAGNOSIS — I49.1 PAC (PREMATURE ATRIAL CONTRACTION): ICD-10-CM

## 2025-06-17 LAB
ABO GROUP (TYPE) IN BLOOD: NORMAL
ALBUMIN SERPL BCP-MCNC: 3.6 G/DL (ref 3.4–5)
ALP SERPL-CCNC: 132 U/L (ref 33–110)
ALT SERPL W P-5'-P-CCNC: 8 U/L (ref 7–45)
ANION GAP SERPL CALC-SCNC: 10 MMOL/L (ref 10–20)
ANTIBODY SCREEN: NORMAL
APTT PPP: 26 SECONDS (ref 26–36)
AST SERPL W P-5'-P-CCNC: 12 U/L (ref 9–39)
BASOPHILS # BLD MANUAL: 0.09 X10*3/UL (ref 0–0.1)
BASOPHILS NFR BLD MANUAL: 0.9 %
BILIRUB SERPL-MCNC: 1.7 MG/DL (ref 0–1.2)
BLASTS # BLD MANUAL: 0 X10*3/UL
BLASTS NFR BLD MANUAL: 0 %
BUN SERPL-MCNC: 7 MG/DL (ref 6–23)
CA-I BLD-SCNC: 1.09 MMOL/L (ref 1.1–1.33)
CALCIUM SERPL-MCNC: 9.1 MG/DL (ref 8.6–10.6)
CARDIAC TROPONIN I PNL SERPL HS: 4 NG/L (ref 0–34)
CHLORIDE SERPL-SCNC: 100 MMOL/L (ref 98–107)
CO2 SERPL-SCNC: 30 MMOL/L (ref 21–32)
CREAT SERPL-MCNC: 0.98 MG/DL (ref 0.5–1.05)
CRP SERPL-MCNC: 1.18 MG/DL
EGFRCR SERPLBLD CKD-EPI 2021: 68 ML/MIN/1.73M*2
EOSINOPHIL # BLD MANUAL: 0.09 X10*3/UL (ref 0–0.7)
EOSINOPHIL NFR BLD MANUAL: 0.9 %
ERYTHROCYTE [DISTWIDTH] IN BLOOD BY AUTOMATED COUNT: 29.2 % (ref 11.5–14.5)
ERYTHROCYTE [SEDIMENTATION RATE] IN BLOOD BY WESTERGREN METHOD: 14 MM/H (ref 0–30)
FLUAV RNA RESP QL NAA+PROBE: NOT DETECTED
FLUBV RNA RESP QL NAA+PROBE: NOT DETECTED
GLUCOSE SERPL-MCNC: 97 MG/DL (ref 74–99)
HCT VFR BLD AUTO: 28.6 % (ref 36–46)
HGB BLD-MCNC: 9.4 G/DL (ref 12–16)
HGB RETIC QN: 20 PG (ref 28–38)
HYPOCHROMIA BLD QL SMEAR: ABNORMAL
IMM GRANULOCYTES # BLD AUTO: 0.03 X10*3/UL (ref 0–0.7)
IMM GRANULOCYTES NFR BLD AUTO: 0.3 % (ref 0–0.9)
IMMATURE RETIC FRACTION: 51.7 %
INR PPP: 1.3 (ref 0.9–1.1)
LDH SERPL L TO P-CCNC: 179 U/L (ref 84–246)
LYMPHOCYTES # BLD MANUAL: 1.1 X10*3/UL (ref 1.2–4.8)
LYMPHOCYTES NFR BLD MANUAL: 10.5 %
MCH RBC QN AUTO: 22.9 PG (ref 26–34)
MCHC RBC AUTO-ENTMCNC: 32.9 G/DL (ref 32–36)
MCV RBC AUTO: 70 FL (ref 80–100)
METAMYELOCYTES # BLD MANUAL: 0 X10*3/UL
METAMYELOCYTES NFR BLD MANUAL: 0 %
MONOCYTES # BLD MANUAL: 0.37 X10*3/UL (ref 0.1–1)
MONOCYTES NFR BLD MANUAL: 3.5 %
MYELOCYTES # BLD MANUAL: 0 X10*3/UL
MYELOCYTES NFR BLD MANUAL: 0 %
NEUTROPHILS # BLD MANUAL: 8.38 X10*3/UL (ref 1.2–7.7)
NEUTS BAND # BLD MANUAL: 0 X10*3/UL (ref 0–0.7)
NEUTS BAND NFR BLD MANUAL: 0 %
NEUTS SEG # BLD MANUAL: 8.38 X10*3/UL (ref 1.2–7)
NEUTS SEG NFR BLD MANUAL: 79.8 %
NEUTS VAC BLD QL SMEAR: PRESENT
NRBC BLD MANUAL-RTO: 0 % (ref 0–0)
NRBC BLD-RTO: 5.6 /100 WBCS (ref 0–0)
PLASMA CELLS # BLD MANUAL: 0 X10*3/UL
PLASMA CELLS NFR BLD MANUAL: 0 %
PLATELET # BLD AUTO: 330 X10*3/UL (ref 150–450)
POLYCHROMASIA BLD QL SMEAR: ABNORMAL
POTASSIUM SERPL-SCNC: 3.7 MMOL/L (ref 3.5–5.3)
PROMYELOCYTES # BLD MANUAL: 0 X10*3/UL
PROMYELOCYTES NFR BLD MANUAL: 0 %
PROT SERPL-MCNC: 7.6 G/DL (ref 6.4–8.2)
PROTHROMBIN TIME: 14.6 SECONDS (ref 9.8–12.4)
RBC # BLD AUTO: 4.11 X10*6/UL (ref 4–5.2)
RBC MORPH BLD: ABNORMAL
RETICS #: 0.01 X10*6/UL (ref 0.02–0.08)
RETICS/RBC NFR AUTO: 0.2 % (ref 0.5–2)
RH FACTOR (ANTIGEN D): NORMAL
SARS-COV-2 RNA RESP QL NAA+PROBE: NOT DETECTED
SICKLE CELLS BLD QL SMEAR: ABNORMAL
SODIUM SERPL-SCNC: 136 MMOL/L (ref 136–145)
TARGETS BLD QL SMEAR: ABNORMAL
TOTAL CELLS COUNTED BLD: 114
VARIANT LYMPHS # BLD MANUAL: 0.46 X10*3/UL (ref 0–0.5)
VARIANT LYMPHS NFR BLD: 4.4 %
WBC # BLD AUTO: 10.5 X10*3/UL (ref 4.4–11.3)

## 2025-06-17 PROCEDURE — 86140 C-REACTIVE PROTEIN: CPT

## 2025-06-17 PROCEDURE — 85652 RBC SED RATE AUTOMATED: CPT

## 2025-06-17 PROCEDURE — 2500000004 HC RX 250 GENERAL PHARMACY W/ HCPCS (ALT 636 FOR OP/ED): Mod: JZ,TB

## 2025-06-17 PROCEDURE — 86901 BLOOD TYPING SEROLOGIC RH(D): CPT

## 2025-06-17 PROCEDURE — 82330 ASSAY OF CALCIUM: CPT

## 2025-06-17 PROCEDURE — 2500000001 HC RX 250 WO HCPCS SELF ADMINISTERED DRUGS (ALT 637 FOR MEDICARE OP)

## 2025-06-17 PROCEDURE — 96374 THER/PROPH/DIAG INJ IV PUSH: CPT | Mod: 59

## 2025-06-17 PROCEDURE — 71046 X-RAY EXAM CHEST 2 VIEWS: CPT | Mod: FOREIGN READ | Performed by: RADIOLOGY

## 2025-06-17 PROCEDURE — 85007 BL SMEAR W/DIFF WBC COUNT: CPT | Performed by: PHYSICIAN ASSISTANT

## 2025-06-17 PROCEDURE — 2500000004 HC RX 250 GENERAL PHARMACY W/ HCPCS (ALT 636 FOR OP/ED): Mod: JZ,TB | Performed by: PHYSICIAN ASSISTANT

## 2025-06-17 PROCEDURE — 84484 ASSAY OF TROPONIN QUANT: CPT | Performed by: STUDENT IN AN ORGANIZED HEALTH CARE EDUCATION/TRAINING PROGRAM

## 2025-06-17 PROCEDURE — 93005 ELECTROCARDIOGRAM TRACING: CPT

## 2025-06-17 PROCEDURE — 2500000001 HC RX 250 WO HCPCS SELF ADMINISTERED DRUGS (ALT 637 FOR MEDICARE OP): Performed by: PHYSICIAN ASSISTANT

## 2025-06-17 PROCEDURE — 85610 PROTHROMBIN TIME: CPT

## 2025-06-17 PROCEDURE — 73030 X-RAY EXAM OF SHOULDER: CPT | Mod: RIGHT SIDE | Performed by: RADIOLOGY

## 2025-06-17 PROCEDURE — 80053 COMPREHEN METABOLIC PANEL: CPT | Performed by: PHYSICIAN ASSISTANT

## 2025-06-17 PROCEDURE — 83615 LACTATE (LD) (LDH) ENZYME: CPT | Performed by: PHYSICIAN ASSISTANT

## 2025-06-17 PROCEDURE — 96376 TX/PRO/DX INJ SAME DRUG ADON: CPT

## 2025-06-17 PROCEDURE — 99285 EMERGENCY DEPT VISIT HI MDM: CPT | Performed by: PHYSICIAN ASSISTANT

## 2025-06-17 PROCEDURE — 99285 EMERGENCY DEPT VISIT HI MDM: CPT | Mod: 25 | Performed by: STUDENT IN AN ORGANIZED HEALTH CARE EDUCATION/TRAINING PROGRAM

## 2025-06-17 PROCEDURE — 2500000004 HC RX 250 GENERAL PHARMACY W/ HCPCS (ALT 636 FOR OP/ED): Mod: JZ,TB | Performed by: STUDENT IN AN ORGANIZED HEALTH CARE EDUCATION/TRAINING PROGRAM

## 2025-06-17 PROCEDURE — 36415 COLL VENOUS BLD VENIPUNCTURE: CPT

## 2025-06-17 PROCEDURE — 83021 HEMOGLOBIN CHROMOTOGRAPHY: CPT

## 2025-06-17 PROCEDURE — 86922 COMPATIBILITY TEST ANTIGLOB: CPT

## 2025-06-17 PROCEDURE — 2500000005 HC RX 250 GENERAL PHARMACY W/O HCPCS

## 2025-06-17 PROCEDURE — 36415 COLL VENOUS BLD VENIPUNCTURE: CPT | Performed by: PHYSICIAN ASSISTANT

## 2025-06-17 PROCEDURE — 1200000003 HC ONCOLOGY  ROOM WITH TELEMETRY DAILY

## 2025-06-17 PROCEDURE — 87636 SARSCOV2 & INF A&B AMP PRB: CPT | Performed by: STUDENT IN AN ORGANIZED HEALTH CARE EDUCATION/TRAINING PROGRAM

## 2025-06-17 PROCEDURE — 71046 X-RAY EXAM CHEST 2 VIEWS: CPT

## 2025-06-17 PROCEDURE — 93010 ELECTROCARDIOGRAM REPORT: CPT | Performed by: STUDENT IN AN ORGANIZED HEALTH CARE EDUCATION/TRAINING PROGRAM

## 2025-06-17 PROCEDURE — 73030 X-RAY EXAM OF SHOULDER: CPT | Mod: RT

## 2025-06-17 PROCEDURE — 85027 COMPLETE CBC AUTOMATED: CPT | Performed by: PHYSICIAN ASSISTANT

## 2025-06-17 PROCEDURE — 85045 AUTOMATED RETICULOCYTE COUNT: CPT | Performed by: PHYSICIAN ASSISTANT

## 2025-06-17 RX ORDER — ONDANSETRON 8 MG/1
8 TABLET, ORALLY DISINTEGRATING ORAL EVERY 8 HOURS PRN
Status: DISCONTINUED | OUTPATIENT
Start: 2025-06-17 | End: 2025-06-23 | Stop reason: HOSPADM

## 2025-06-17 RX ORDER — CYCLOBENZAPRINE HCL 10 MG
10 TABLET ORAL 3 TIMES DAILY PRN
Status: DISCONTINUED | OUTPATIENT
Start: 2025-06-17 | End: 2025-06-23 | Stop reason: HOSPADM

## 2025-06-17 RX ORDER — ASCORBIC ACID 500 MG
500 TABLET ORAL DAILY
Status: DISCONTINUED | OUTPATIENT
Start: 2025-06-17 | End: 2025-06-23 | Stop reason: HOSPADM

## 2025-06-17 RX ORDER — FOLIC ACID 1 MG/1
1 TABLET ORAL DAILY
Status: DISCONTINUED | OUTPATIENT
Start: 2025-06-17 | End: 2025-06-23 | Stop reason: HOSPADM

## 2025-06-17 RX ORDER — ATROPINE SULFATE 10 MG/ML
1 SOLUTION/ DROPS OPHTHALMIC NIGHTLY
Status: DISCONTINUED | OUTPATIENT
Start: 2025-06-17 | End: 2025-06-23 | Stop reason: HOSPADM

## 2025-06-17 RX ORDER — PETROLATUM 420 MG/G
2 OINTMENT TOPICAL 2 TIMES DAILY
Status: DISCONTINUED | OUTPATIENT
Start: 2025-06-17 | End: 2025-06-23 | Stop reason: HOSPADM

## 2025-06-17 RX ORDER — HYDROMORPHONE HYDROCHLORIDE 1 MG/ML
1 INJECTION, SOLUTION INTRAMUSCULAR; INTRAVENOUS; SUBCUTANEOUS
Status: DISCONTINUED | OUTPATIENT
Start: 2025-06-17 | End: 2025-06-18

## 2025-06-17 RX ORDER — POLYETHYLENE GLYCOL 3350 17 G/17G
17 POWDER, FOR SOLUTION ORAL DAILY
Status: DISCONTINUED | OUTPATIENT
Start: 2025-06-17 | End: 2025-06-23 | Stop reason: HOSPADM

## 2025-06-17 RX ORDER — HYDROMORPHONE HYDROCHLORIDE 1 MG/ML
1 INJECTION, SOLUTION INTRAMUSCULAR; INTRAVENOUS; SUBCUTANEOUS
Status: DISCONTINUED | OUTPATIENT
Start: 2025-06-17 | End: 2025-06-17

## 2025-06-17 RX ORDER — METOPROLOL TARTRATE 25 MG/1
25 TABLET, FILM COATED ORAL 2 TIMES DAILY
Status: DISCONTINUED | OUTPATIENT
Start: 2025-06-17 | End: 2025-06-19

## 2025-06-17 RX ORDER — FLUTICASONE PROPIONATE 50 MCG
2 SPRAY, SUSPENSION (ML) NASAL DAILY PRN
Status: DISCONTINUED | OUTPATIENT
Start: 2025-06-17 | End: 2025-06-23 | Stop reason: HOSPADM

## 2025-06-17 RX ORDER — DIPHENHYDRAMINE HCL 25 MG
25 CAPSULE ORAL EVERY 6 HOURS PRN
Status: DISCONTINUED | OUTPATIENT
Start: 2025-06-17 | End: 2025-06-23 | Stop reason: HOSPADM

## 2025-06-17 RX ORDER — FUROSEMIDE 20 MG/1
20 TABLET ORAL EVERY OTHER DAY
Status: DISCONTINUED | OUTPATIENT
Start: 2025-06-18 | End: 2025-06-23 | Stop reason: HOSPADM

## 2025-06-17 RX ORDER — ALBUTEROL SULFATE 90 UG/1
2 INHALANT RESPIRATORY (INHALATION) EVERY 6 HOURS PRN
Qty: 18 G | Refills: 3 | Status: ON HOLD | OUTPATIENT
Start: 2025-06-17 | End: 2026-06-17

## 2025-06-17 RX ORDER — ALBUTEROL SULFATE 90 UG/1
2 INHALANT RESPIRATORY (INHALATION) EVERY 6 HOURS PRN
Status: DISCONTINUED | OUTPATIENT
Start: 2025-06-17 | End: 2025-06-23 | Stop reason: HOSPADM

## 2025-06-17 RX ORDER — CETIRIZINE HYDROCHLORIDE 10 MG/1
10 TABLET ORAL DAILY
Status: DISCONTINUED | OUTPATIENT
Start: 2025-06-17 | End: 2025-06-23 | Stop reason: HOSPADM

## 2025-06-17 RX ORDER — AMOXICILLIN 250 MG
2 CAPSULE ORAL NIGHTLY
Status: DISCONTINUED | OUTPATIENT
Start: 2025-06-17 | End: 2025-06-22

## 2025-06-17 RX ADMIN — HYDROMORPHONE HYDROCHLORIDE 1 MG: 1 INJECTION, SOLUTION INTRAMUSCULAR; INTRAVENOUS; SUBCUTANEOUS at 15:35

## 2025-06-17 RX ADMIN — ONDANSETRON 8 MG: 8 TABLET, ORALLY DISINTEGRATING ORAL at 11:07

## 2025-06-17 RX ADMIN — CYCLOBENZAPRINE 10 MG: 10 TABLET, FILM COATED ORAL at 11:07

## 2025-06-17 RX ADMIN — HYDROMORPHONE HYDROCHLORIDE 1 MG: 1 INJECTION, SOLUTION INTRAMUSCULAR; INTRAVENOUS; SUBCUTANEOUS at 11:06

## 2025-06-17 RX ADMIN — Medication 2 L/MIN: at 23:35

## 2025-06-17 RX ADMIN — FOLIC ACID 1 MG: 1 TABLET ORAL at 15:36

## 2025-06-17 RX ADMIN — DIPHENHYDRAMINE HYDROCHLORIDE 25 MG: 25 CAPSULE ORAL at 11:07

## 2025-06-17 RX ADMIN — OXYCODONE HYDROCHLORIDE AND ACETAMINOPHEN 500 MG: 500 TABLET ORAL at 15:36

## 2025-06-17 RX ADMIN — HYDROMORPHONE HYDROCHLORIDE 1 MG: 1 INJECTION, SOLUTION INTRAMUSCULAR; INTRAVENOUS; SUBCUTANEOUS at 20:59

## 2025-06-17 RX ADMIN — ATROPINE SULFATE 1 DROP: 10 SOLUTION/ DROPS OPHTHALMIC at 22:26

## 2025-06-17 RX ADMIN — CETIRIZINE HYDROCHLORIDE 10 MG: 10 TABLET, FILM COATED ORAL at 15:36

## 2025-06-17 RX ADMIN — METOPROLOL TARTRATE 25 MG: 50 TABLET, FILM COATED ORAL at 15:36

## 2025-06-17 RX ADMIN — HYDROMORPHONE HYDROCHLORIDE 1 MG: 1 INJECTION, SOLUTION INTRAMUSCULAR; INTRAVENOUS; SUBCUTANEOUS at 14:16

## 2025-06-17 SDOH — ECONOMIC STABILITY: FOOD INSECURITY: WITHIN THE PAST 12 MONTHS, THE FOOD YOU BOUGHT JUST DIDN'T LAST AND YOU DIDN'T HAVE MONEY TO GET MORE.: NEVER TRUE

## 2025-06-17 SDOH — SOCIAL STABILITY: SOCIAL INSECURITY: WITHIN THE LAST YEAR, HAVE YOU BEEN HUMILIATED OR EMOTIONALLY ABUSED IN OTHER WAYS BY YOUR PARTNER OR EX-PARTNER?: NO

## 2025-06-17 SDOH — ECONOMIC STABILITY: FOOD INSECURITY: WITHIN THE PAST 12 MONTHS, YOU WORRIED THAT YOUR FOOD WOULD RUN OUT BEFORE YOU GOT THE MONEY TO BUY MORE.: NEVER TRUE

## 2025-06-17 SDOH — HEALTH STABILITY: PHYSICAL HEALTH: ON AVERAGE, HOW MANY DAYS PER WEEK DO YOU ENGAGE IN MODERATE TO STRENUOUS EXERCISE (LIKE A BRISK WALK)?: 3 DAYS

## 2025-06-17 SDOH — SOCIAL STABILITY: SOCIAL INSECURITY: DO YOU FEEL UNSAFE GOING BACK TO THE PLACE WHERE YOU ARE LIVING?: NO

## 2025-06-17 SDOH — SOCIAL STABILITY: SOCIAL INSECURITY: WITHIN THE LAST YEAR, HAVE YOU BEEN AFRAID OF YOUR PARTNER OR EX-PARTNER?: NO

## 2025-06-17 SDOH — SOCIAL STABILITY: SOCIAL INSECURITY: DO YOU FEEL ANYONE HAS EXPLOITED OR TAKEN ADVANTAGE OF YOU FINANCIALLY OR OF YOUR PERSONAL PROPERTY?: NO

## 2025-06-17 SDOH — ECONOMIC STABILITY: INCOME INSECURITY: IN THE PAST 12 MONTHS HAS THE ELECTRIC, GAS, OIL, OR WATER COMPANY THREATENED TO SHUT OFF SERVICES IN YOUR HOME?: NO

## 2025-06-17 SDOH — SOCIAL STABILITY: SOCIAL INSECURITY: ABUSE: ADULT

## 2025-06-17 SDOH — HEALTH STABILITY: PHYSICAL HEALTH: ON AVERAGE, HOW MANY MINUTES DO YOU ENGAGE IN EXERCISE AT THIS LEVEL?: 90 MIN

## 2025-06-17 SDOH — SOCIAL STABILITY: SOCIAL INSECURITY: ARE THERE ANY APPARENT SIGNS OF INJURIES/BEHAVIORS THAT COULD BE RELATED TO ABUSE/NEGLECT?: NO

## 2025-06-17 SDOH — SOCIAL STABILITY: SOCIAL INSECURITY: WERE YOU ABLE TO COMPLETE ALL THE BEHAVIORAL HEALTH SCREENINGS?: YES

## 2025-06-17 SDOH — SOCIAL STABILITY: SOCIAL INSECURITY: DOES ANYONE TRY TO KEEP YOU FROM HAVING/CONTACTING OTHER FRIENDS OR DOING THINGS OUTSIDE YOUR HOME?: NO

## 2025-06-17 SDOH — SOCIAL STABILITY: SOCIAL INSECURITY: HAS ANYONE EVER THREATENED TO HURT YOUR FAMILY OR YOUR PETS?: NO

## 2025-06-17 SDOH — SOCIAL STABILITY: SOCIAL INSECURITY: ARE YOU OR HAVE YOU BEEN THREATENED OR ABUSED PHYSICALLY, EMOTIONALLY, OR SEXUALLY BY ANYONE?: NO

## 2025-06-17 SDOH — SOCIAL STABILITY: SOCIAL INSECURITY: HAVE YOU HAD THOUGHTS OF HARMING ANYONE ELSE?: NO

## 2025-06-17 SDOH — SOCIAL STABILITY: SOCIAL INSECURITY: HAVE YOU HAD ANY THOUGHTS OF HARMING ANYONE ELSE?: NO

## 2025-06-17 ASSESSMENT — ACTIVITIES OF DAILY LIVING (ADL)
LACK_OF_TRANSPORTATION: YES
BATHING: INDEPENDENT
ADEQUATE_TO_COMPLETE_ADL: YES
HEARING - RIGHT EAR: DIFFICULTY WITH NOISE
FEEDING YOURSELF: INDEPENDENT
DRESSING YOURSELF: INDEPENDENT
JUDGMENT_ADEQUATE_SAFELY_COMPLETE_DAILY_ACTIVITIES: YES
GROOMING: INDEPENDENT
WALKS IN HOME: INDEPENDENT
HEARING - LEFT EAR: DIFFICULTY WITH NOISE
PATIENT'S MEMORY ADEQUATE TO SAFELY COMPLETE DAILY ACTIVITIES?: YES
TOILETING: INDEPENDENT

## 2025-06-17 ASSESSMENT — COGNITIVE AND FUNCTIONAL STATUS - GENERAL
MOBILITY SCORE: 24
PATIENT BASELINE BEDBOUND: NO
DAILY ACTIVITIY SCORE: 24

## 2025-06-17 ASSESSMENT — LIFESTYLE VARIABLES
HOW OFTEN DO YOU HAVE 6 OR MORE DRINKS ON ONE OCCASION: NEVER
HOW OFTEN DO YOU HAVE A DRINK CONTAINING ALCOHOL: NEVER
AUDIT-C TOTAL SCORE: 0
HOW MANY STANDARD DRINKS CONTAINING ALCOHOL DO YOU HAVE ON A TYPICAL DAY: PATIENT DOES NOT DRINK
SKIP TO QUESTIONS 9-10: 1
AUDIT-C TOTAL SCORE: 0

## 2025-06-17 ASSESSMENT — PAIN - FUNCTIONAL ASSESSMENT
PAIN_FUNCTIONAL_ASSESSMENT: 0-10
PAIN_FUNCTIONAL_ASSESSMENT: 0-10

## 2025-06-17 ASSESSMENT — PAIN SCALES - GENERAL
PAINLEVEL_OUTOF10: 9
PAINLEVEL_OUTOF10: 8
PAINLEVEL_OUTOF10: 9
PAINLEVEL_OUTOF10: 10 - WORST POSSIBLE PAIN

## 2025-06-17 ASSESSMENT — PAIN DESCRIPTION - LOCATION: LOCATION: GENERALIZED

## 2025-06-17 NOTE — TELEPHONE ENCOUNTER
PT called Triage line for sickle cell pain crisis. Reports 10/10 pain. Secure chat sent to [facility] NP to return PT's call and schedule.

## 2025-06-17 NOTE — PROGRESS NOTES
Pharmacy Medication History Review    Senait Narvaez is a 55 y.o. female admitted for Sickle cell pain crisis (Multi). Pharmacy reviewed the patient's [id number] medications and allergies for accuracy.    Medications ADDED:  None  Medications CHANGED:  None  Medications REMOVED:   Flonase     The list below reflects the updated PTA list.   Prior to Admission Medications   Prescriptions Last Dose Informant   albuterol 90 mcg/actuation inhaler More than a month Self   Sig: Inhale 2 puffs every 6 hours if needed for wheezing.   ascorbic acid (Vitamin C) 500 mg chewable tablet 6/16/2025 Morning Self   Sig: Chew 1 tablet (500 mg) once daily.   atropine 1 % ophthalmic solution 6/16/2025 Bedtime Self   Sig: Administer 1 drop into the left eye once daily at bedtime. Please continue to administer to Left eye until seen at outpt clinic with ophthalmology this upcoming week   benzocaine-menthol (Cepastat Sore Throat) lozenge Past Week Self   Sig: Dissolve 1 lozenge in the mouth every 2 hours if needed for sore throat.   cyclobenzaprine (Flexeril) 10 mg tablet 6/14/2025 Self   Sig: Take 1 tablet (10 mg) by mouth 3 times a day as needed for muscle spasms.  No recent fill history for patient but patient says they are currently taking.    folic acid (Folvite) 1 mg tablet 6/16/2025 Morning Self   Sig: Take 1 tablet (1 mg) by mouth once daily.  No recent fill history for patient but patient says they are currently taking.    furosemide (Lasix) 20 mg tablet 6/15/2025 Self   Sig: Take 1 tablet (20 mg) by mouth every other day.   loratadine (Claritin) 10 mg tablet 6/14/2025 Self   Sig: Take 1 tablet (10 mg) by mouth once daily as needed for allergies.   metoprolol tartrate (Lopressor) 25 mg tablet 6/16/2025 Evening Self   Sig: Take 1 tablet (25 mg) by mouth 2 times a day.   multivitamin tablet 6/16/2025 Self   Sig: Take 1 tablet by mouth once daily.   naloxone (Narcan) 4 mg/0.1 mL nasal spray Not Taking Self   Sig: Administer 1  "spray (4 mg) into affected nostril(s) if needed for opioid reversal. May repeat every 2-3 minutes if needed, alternating nostrils, until medical assistance becomes available.   Patient not taking: Reported on 6/17/2025   ondansetron (Zofran) 4 mg tablet Past Month Self   Sig: Take 1 tablet (4 mg) by mouth every 8 hours if needed for nausea or vomiting.   oxyCODONE (Roxicodone) 10 mg immediate release tablet 6/15/2025 Self   Sig: Take 1 tablet (10 mg) by mouth every 4 hours if needed for severe pain (7 - 10) (pain).   oxygen (O2) gas therapy Unknown Self   Sig: Inhale 1 each continuously.   phenoL (Chloraseptic) 1.4 % aerosol,spray Not Taking Self   Sig: Use 1 spray in the mouth or throat every 2 hours if needed for sore throat.   Patient not taking: Reported on 6/17/2025   warfarin (Coumadin) 5 mg tablet 6/16/2025 Self   Sig: Take 1 tablet (5 mg) by mouth once daily in the evening.   white petrolatum (Aquaphor) 41 % ointment ointment Not Taking Self   Sig: Apply 2 Applications topically 2 times a day. Apply vaseline to all eroded/denuded areas. Mepilex transfer sheets may be used for areas of friction   Patient not taking: Reported on 6/17/2025      Facility-Administered Medications: None        The list below reflects the updated allergy list. Please review each documented allergy for additional clarification and justification.  Allergies  Reviewed by Kevyn Madrigal on 6/17/2025        Severity Reactions Comments    Vancomycin High Rash     Oxycontin [oxycodone] Not Specified Drowsiness             Patient accepts M2B at discharge.     Sources:   Pharmacy dispense history  Patient Interview Moderate historian  Chart Review  Care Everywhere     Additional Comments:  Patient was able to recall some medication names as well as frequency and some doses       KEVYN MADRIGAL  Pharmacy Technician  06/17/25     Secure Chat preferred   If no response call [phone] or [employer] \"Med Rec\"    "

## 2025-06-17 NOTE — TELEPHONE ENCOUNTER
[facility] pharmacy also sent request for ventolin HFA inhaler refill- last fill was 5/11/25.

## 2025-06-17 NOTE — Clinical Note
50mcg fent given. 500mL LR bolus due to low BP. R Fem apheresis placement. Dressing CDI. PT stable throughout procedure. Report given to RPCU RN, PT placed in transport.

## 2025-06-17 NOTE — H&P
History Of Present Illness  Senait Narvaez is a 55 y.o. 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 Pt states she has felt dizzy for the past three weeks. States she passed out about three weeks ago and hit her R shoulder on the wall. Pt was sent to the ED by Dr. Whaley to be evaluated for pain, dizziness and plan for inpatient exchange. Admits to some CP and dizziness, denies SOB, N/V/D/C, fever, chills, abdominal pain, vision changes, HA. ROS: A complete review of systems was performed and is negative except for as mentioned above in the HPI     ED course:  VSS  Orthos pending  Labs: WBC 10.5, hgb 9.4, plt 330, retic 0.2%, , tbili 1.7  Troponin pending  EKG pending  Covid/Flu pending  CXR (6/17) no acute process  S/p IV dilaudid 1mg x 3 doses     Past Medical History  Medical History[1]    Surgical History  Surgical History[2]     Social History  She reports that she quit smoking about 11 years ago. Her smoking use included cigarettes. She has been exposed to tobacco smoke. She has never used smokeless tobacco. She reports that she does not currently use alcohol. She reports that she does not currently use drugs.    Family History  Family History[3]     Allergies  Vancomycin and Oxycontin [oxycodone]     Physical Exam  HENT:      Nose: Nose normal.      Mouth/Throat:      Mouth: Mucous membranes are moist.   Eyes:      Pupils: Pupils are equal, round, and reactive to light.   Cardiovascular:      Rate and Rhythm: Normal rate.   Pulmonary:      Effort: Pulmonary effort is normal.   Abdominal:      General: Abdomen  "is flat.   Musculoskeletal:         General: Normal range of motion.      Cervical back: Normal range of motion.   Neurological:      General: No focal deficit present.      Mental Status: She is alert.   Psychiatric:         Mood and Affect: Mood normal.          Last Recorded Vitals  Blood pressure 135/77, pulse 78, temperature 36.5 °C (97.7 °F), resp. rate 16, height 1.651 m (5' 5\"), weight 103 kg (226 lb), SpO2 95%.    Relevant Results  XR chest 2 views  Result Date: 6/17/2025  STUDY: Chest Radiographs;  06/17/2025 11:20 a.m. INDICATION: Chest pain, sickle cell pain. COMPARISON: XR Chest 08/25/2023, 08/14/2023;  CT Chest 10/18/2016. ACCESSION NUMBER(S): [id number] ORDERING CLINICIAN: SUMA SMITH TECHNIQUE:  Frontal and lateral chest. FINDINGS: CARDIOMEDIASTINAL SILHOUETTE: Cardiomediastinal silhouette is normal in size and configuration.  LUNGS: Lungs are clear.  There is no pneumothorax or pleural effusion.  ABDOMEN: No remarkable upper abdominal findings.  BONES: No acute osseous changes.    No acute cardiopulmonary disease.. Signed by Eric Lazo, DO       Assessment & Plan  Sickle cell pain crisis (Multi)      Senait Narvaez is a 55 y.o. 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 CXR (6/17) neg for acute process. Admitted for further management. Transfusion med consulted for routine inpatient exchange transfusion. Exchange labs ordered. Plan femoral apheresis line placement tomorrow 6/18. R shoulder xray pending (6/17). DC pending exchange transfusion and pain control.     # Hb SC disease   # Acute on " chronic SCD related pain crisis  # routine inpatient exchange  - currently on q6 week RBC exchange transfusions (most recent outpatient 4/18 and inpatient 5/8)  - [facility] reviewed, last updated 4/16/25: IV Dilaudid 1-1.5 mg every 2-3 hours prn for severe pain   - OARRS reviewed, no aberrant behavior noted   - CXR (6/17) neg for acute process  - EKG pending on admission (6/17)  - Ortho BP pending on admission (6/17)  - Covid/Flu pending (6/17)  - Hgb BL ~ 8, Hgb 9.4 on admission (6/17) - no indication for simple transfusion  - tbili BL ~ 1.0, 1.7 on admission (6/17)  - LDH ~150-200,  on admission (6/17)  - Start IV dilaudid 1mg q3h PRN for severe pain (6/17-current)  - Cyclobenzaprine 10 mg TID PRN   - Bowel regimen for opioid induced constipation with Senna 2tabs nightly and Miralax daily, PO Benadryl 25 mg q6h PRN for opioid-induced pruritus, PO Zofran 8 mg q8h PRN for opioid-induced nausea  - c/w home folic acid 1 mg daily  - Utox pending (6/17)  - Hgb S pending (6/17)  - Exchange labs ordered 6/17  - Transfusion med consulted per Dr. Whaley (6/17), plan routine inpatient exchange 6/19  - Femoral apheresis line placement ordered for 6/18  - IS encouraged   - R shoulder xray pending (6/17)  - ESR/CRP pending (6/17)    # hx of DVT/PE  # hx of SVC syndrome  - home warfarin held on admission for apheresis line placement, plan to restart after placement  - SCDs  - Daily Coags to monitor INR    # HFpEF  # pHTN  # hx of troponinemia   - Last TTE 5/9/25: HFpEF with EF of 67% right ventricular overloaded, severely enlarged RV   - troponin leak likely iso demand  - on lasix 20 every other day at home  - troponin pending on admission (6/17)    # hx of SVT  - Metoprolol tartrate 25 mg BID  - tele    #Dispo  - Full code, confirmed on admit  - DC pending routine exchange and pain control    I spent 90 minutes in the professional and overall care of this patient.    Assessment and plan as above to be discussed in AM with  attending physician Dr. Shellie Kruger PA-C         [1]   Past Medical History:  Diagnosis Date    Asthma     CHF (congestive heart failure)     Chronic pain disorder     Compression of vein 02/19/2020    Superior vena cava syndrome    Hypotension, unspecified 12/10/2020   [2]   Past Surgical History:  Procedure Laterality Date    APPENDECTOMY  08/05/2013    Appendectomy    BIOPSY  9/15/2024    CHOLECYSTECTOMY  08/05/2013    Cholecystectomy    CT ANGIO NECK  10/19/2022    CT NECK ANGIO W AND WO IV CONTRAST 10/19/2022 [facility] CLINICAL LEGACY    CT GUIDED PERCUTANEOUS BIOPSY LYMPH NODE SUPERFICIAL  9/19/2019    CT GUIDED PERCUTANEOUS BIOPSY LYMPH NODE SUPERFICIAL 9/19/2019 [facility] INPATIENT LEGACY    IR CVC NONTUNNELED  10/26/2023    IR CVC NONTUNNELED 10/26/2023 [facility] ANGIO    IR CVC NONTUNNELED  12/7/2023    IR CVC NONTUNNELED 12/7/2023 Marcos Moser MD [facility] ANGIO    IR CVC TUNNELED  3/13/2015    IR CVC TUNNELED 3/13/2015 [facility] CLINICAL LEGACY    IR CVC TUNNELED  1/29/2016    IR CVC TUNNELED 1/29/2016 [facility] AIB LEGACY    IR CVC TUNNELED  1/17/2018    IR CVC TUNNELED 1/17/2018 [facility] AIB LEGACY    IR CVC TUNNELED  2/22/2018    IR CVC TUNNELED 2/22/2018 [facility] AIB LEGACY    IR CVC TUNNELED  3/22/2018    IR CVC TUNNELED 3/22/2018 [facility] CLINICAL LEGACY    IR CVC TUNNELED  4/18/2018    IR CVC TUNNELED 4/18/2018 [facility] AIB LEGACY    IR CVC TUNNELED  5/16/2018    IR CVC TUNNELED 5/16/2018 [facility] AIB LEGACY    IR CVC TUNNELED  6/12/2018    IR CVC TUNNELED 6/12/2018 [facility] AIB LEGACY    IR CVC TUNNELED  1/21/2020    IR CVC TUNNELED 1/21/2020 [facility] INPATIENT LEGACY    IR CVC TUNNELED  2/28/2020    IR CVC TUNNELED 2/28/2020 [facility] ANCILLARY LEGACY    IR CVC TUNNELED  4/10/2020    IR CVC TUNNELED 4/10/2020 [facility] AIB LEGACY    IR CVC TUNNELED  5/22/2020    IR CVC TUNNELED 5/22/2020 [facility] ANCILLARY LEGACY    IR CVC TUNNELED  6/19/2020    IR CVC TUNNELED 6/19/2020 [facility] AIB LEGACY    IR CVC TUNNELED  7/23/2020    IR CVC TUNNELED 7/23/2020 [facility]  AIB LEGACY    IR CVC TUNNELED  9/4/2020    IR CVC TUNNELED 9/4/2020 [facility] [place] [place]    IR CVC TUNNELED  10/9/2020    IR CVC TUNNELED 10/9/2020 [facility]    IR CVC TUNNELED  11/13/2020    IR CVC TUNNELED 11/13/2020 [facility]    IR CVC TUNNELED  12/18/2020    IR CVC TUNNELED 12/18/2020 [facility]GAC[facility]    IR CVC TUNNELED  1/22/2021    IR CVC TUNNELED 1/22/2021 [facility] LEGAC[facility]    IR CVC TUNNELED  2/26/2021    IR CVC TUNNELED 2/26/2021 [facility] CLINICAL LEGACY    IR CVC TUNNELED  4/2/2021    IR CVC TUNNELED 4/2/2021 [facility]    IR CVC TUNNELED  5/7/2021    IR CVC TUNNELED 5/7/2021 [facility]GAC[facility]    IR CVC TUNNELED  6/11/2021    IR CVC TUNNELED 6/11/2021 [facility]    IR CVC TUNNELED  7/16/2021    IR CVC TUNNELED 7/16/2021 [facility]GAC[facility]    IR CVC TUNNELED  8/20/2021    IR CVC TUNNELED 8/20/2021 [facility]GAC[facility]    IR CVC TUNNELED  9/24/2021    IR CVC TUNNELED 9/24/2021 [facility]GAC[facility]    IR CVC TUNNELED  10/29/2021    IR CVC TUNNELED 10/29/2021 [facility] LEGAC[facility]    IR CVC TUNNELED  12/3/2021    IR CVC TUNNELED 12/3/2021 [facility]GAC[facility]    IR CVC TUNNELED  1/7/2022    IR CVC TUNNELED 1/7/2022 [facility]GAC[facility]    IR CVC TUNNELED  2/23/2022    IR CVC TUNNELED 2/23/2022 [facility] CLINICAL LEGACY    IR CVC TUNNELED  3/25/2022    IR CVC TUNNELED 3/25/2022 [facility]GAC[facility]    IR CVC TUNNELED  4/22/2022    IR CVC TUNNELED 4/22/2022 [facility]GAC[facility]    IR CVC TUNNELED  6/3/2022    IR CVC TUNNELED 6/3/2022 [facility]GAC[facility]    IR CVC TUNNELED  9/18/2017    IR CVC TUNNELED 9/18/2017 [facility] AIB LEGACY    IR CVC TUNNELED  10/30/2017    IR CVC TUNNELED 10/30/2017 [facility] AIB LEGACY    IR CVC TUNNELED  12/19/2017    IR CVC TUNNELED 12/19/2017 [facility] AIB LEGACY    IR CVC TUNNELED  1/6/2023    IR CVC TUNNELED 1/6/2023 [facility] LEGACY    IR CVC TUNNELED  2/24/2023    IR CVC TUNNELED [facility] ANGIO    IR CVC TUNNELED  7/8/2022    IR CVC TUNNELED 7/8/2022 [facility] ANCILLARY LEGACY    IR CVC TUNNELED  8/12/2022    IR CVC  TUNNELED 8/12/2022 [facility] CLINICAL LEGACY    IR CVC TUNNELED  9/16/2022    IR CVC TUNNELED 9/16/2022 [facility] ANCILLARY LEGACY    IR CVC TUNNELED  10/20/2022    IR CVC TUNNELED 10/20/2022 [facility] CLINICAL LEGACY    IR CVC TUNNELED  11/29/2022    IR CVC TUNNELED 11/29/2022 [facility] ANCILLARY LEGACY    IR CVC TUNNELED  3/31/2023    IR CVC TUNNELED [facility] ANGIO    IR CVC TUNNELED  6/9/2023    IR CVC TUNNELED 6/9/2023 [facility] ANGIO    IR CVC TUNNELED  7/20/2023    IR CVC TUNNELED 7/20/2023 [facility] ANGIO    IR CVC TUNNELED  8/16/2023    IR CVC TUNNELED 8/16/2023 [facility] ANGIO    IR CVC TUNNELED  9/21/2023    IR CVC TUNNELED 9/21/2023 [facility] ANGIO    MR HEAD ANGIO WO IV CONTRAST  8/16/2014    MR HEAD ANGIO WO IV CONTRAST 8/16/2014 [facility] ANCILLARY LEGACY    MR NECK ANGIO WO IV CONTRAST  8/16/2014    MR NECK ANGIO WO IV CONTRAST 8/16/2014 [facility] ANCILLARY LEGACY    OTHER SURGICAL HISTORY  05/13/2014    Fluid Drained, Retina Inspected: Breaks Supported By Buckle    OTHER SURGICAL HISTORY  10/09/2014    Closed Treatment Of Fracture Of The Lateral Malleolus    OTHER SURGICAL HISTORY  06/09/2014    Salpingo-oophorectomy Right Side    US GUIDED BIOPSY LYMPH NODE SUPERFICIAL  9/17/2019    US GUIDED BIOPSY LYMPH NODE SUPERFICIAL 9/17/2019 [facility] [place]   [3]   Family History  Problem Relation Name Age of Onset    Diabetes Father      Gout Father      Sickle cell trait Father      Seizures Sister      Diabetes Other      Uterine cancer Other      Other (congenital blindness) Cousin

## 2025-06-17 NOTE — TELEPHONE ENCOUNTER
Pt requesting refill   Oxycodone 10mg. 1 tablet every 4 hrs prn  Pharmacy:[facility] [address] [place]  Last FUV 4/16, next FUV 6/23

## 2025-06-17 NOTE — ED PROVIDER NOTES
History of Present Illness     History provided by: Patient  Limitations to History: None  External Records Reviewed with Brief Summary: Telephone messages with hematologist regarding admission for pain control and exchange transfusion.     HPI:  Senait Narvaez is a 55 y.o. female with PMHx HTN, sickle cell disease, DVT/PE on coumadin who is presenting for diffuse pain.  She states that has been going on for about a week.  Pain is diffuse, worse in bilateral arms and shoulders.  No trauma.  No numbness, tingling, weakness.  Also reports an intermittent cough of clear sputum and diarrhea.  No chest pain, shortness of breath, fevers, abdominal pain, nausea or vomiting.  No lower extremity tenderness or edema.  She is compliant with her warfarin.  She would feel to have an exchange transfusion this week however it was canceled, and she has been communicating with her hematologist about admission and getting transfusion inpatient.  She also reports that she has had lightheadedness.  No syncope or room spinning sensation.    Physical Exam   Triage vitals:  T 36.5 °C (97.7 °F)  HR 78  /77  RR 16  O2 95 % None (Room air)    GEN: Uncomfortable but nontoxic appearing female, no acute distress  HEENT: Normocephalic and atraumatic. Moist mucous membranes.  NECK: Normal ROM. No midline cervical tenderness.  CARDIAC: Regular rate and rhythm. No reproducible chest tenderness.  RESP: No increased work of breathing. Clear lungs bilaterally.  ABD: Soft, nondistended, nontender.  EXTREMITIES: Normal ROM. No edema or tenderness.   BACK: No midline vertebral tenderness. Mild bilateral lumbar paraspinal tenderness.  SKIN: Warm, dry.   NEURO: Alert and oriented x4. Moving all extremities symmetrically and spontaneously. 5/5 strength in all 4 extremities, sensation intact to light touch.   PSYCH: Calm, cooperative.    Medical Decision Making & ED Course   Medical Decision Makin y.o. female with PMHx HTN, sickle cell disease,  DVT/PE on coumadin who is presenting for diffuse pain.  Vitals unremarkable.  On exam, she is uncomfortable but nontoxic appearing, in no respiratory distress with clear lungs, soft nontender abdomen, no lower extremity edema, and grossly normal neurological exam.    Lower suspicion for acute chest however with her report of cough, chest x-ray obtained in triage.  Basic labs obtained as well. No chest pain, dyspnea, or abnormal vitals concerning for ACS, PE or other cardiopulmonary pathology. Will add viral swabs. Will follow her care plan, and anticipate admission to [facility] for pain control and exchange transfusion.  ----     Social Determinants of Health which Significantly Impact Care: None identified     EKG Independent Interpretation: EKG interpreted by myself. Please see ED Course for full interpretation.    Independent Result Review and Interpretation: Relevant laboratory and radiographic results were reviewed and independently interpreted by myself.  As necessary, they are commented on in the ED Course.    The patient was discussed with the following consultants/services: None    ED Course:  ED Course as of 06/18/25 1640   Tue Jun 17, 2025   1323 XR chest 2 views  On my independent interpretation, no infiltrates or consolidations concerning for pneumonia. [SS]   1324 On my independent interpretation, normal sinus rhythm with heart rate 74, right axis, normal intervals, T wave inversions in leads V2 through 5 that are unchanged compared to prior EKG.  No ST segment changes. [SS]   1420 XR chest 2 views  IMPRESSION:  No acute cardiopulmonary disease..   [SS]   1420 On my independent review of labs, no leukocytosis, hemoglobin at baseline, does have low reticulocyte count today, normal CMP.  Awaiting troponin and INR, and added viral swabs. [SS]   1439 Admitted to onc [SS]      ED Course User Index  [SS] Funmilayo Dean MD         Diagnoses as of 06/18/25 1640   Sickle cell pain crisis (Multi)     Disposition    Admit    Procedures   Procedures    Funmilayo Dean MD  Emergency Medicine       Funmilayo Dean MD  06/18/25 1640

## 2025-06-17 NOTE — ED TRIAGE NOTES
Emergency Department Encounter  [facility] EMERGENCY MEDICINE    Patient: Senait Narvaez  MRN: [id number]  : 1970  Date of Evaluation: 2025  Triage Provider: Luis Bass PA-C      Chief Complaint       Chief Complaint   Patient presents with    Sickle Cell Pain Crisis       HPI       Senait Narvaez is a 55 y.o. female with PmHx of sickle cell who presents to the emergency department complaining of sickle cell related pain. CP, all over pain, cough. Pain x1week. Oxy 10mg at home not helping (q4hr) last dose 2 days ago, now out of meds.   Supposed to have exchange last Wednesday, now not scheduled, sent by miguel a (Dr Whaley) for admission and exchange transfusion.        Physical Exam       ED Triage Vitals [25 [id number]]   Temperature Heart Rate Respirations BP   36.5 °C (97.7 °F) 78 16 135/77      Pulse Ox Temp src Heart Rate Source Patient Position   95 % -- -- --      BP Location FiO2 (%)     -- --         Focused PE    GENERAL:  Vital signs as documented.   PULMONARY:  CTABL, no respiratory distress. Able to speak full sentences, no accessory muscle use  CARDIAC:   Normal rate.   ABDOMEN:  Exam is limited by patient positioning in triage, soft, non distended, non tender, NABS, No guarding, no peritoneal signs,   MUSCULOSKELETAL:   No obvious deformities. Diffusely tender  SKIN:   Good color, with no significant rashes.  No pallor.  NEURO:  Alert and oriented, speech clear and coherent    Plan     Cbc, cmp, retic, ldh, iv, pain meds per care plan, EKG, CXR          Pain management in the Emergency Department (ED) or Acute Care Clinic (ACC)                1.          Ask the patient: What pain medicine have you taken? When did you last take it, what was the dose, and how many/much did you take?   2.          Treat patient's pain first. This can be done while labs are pending. Reassess their pain 30 minutes after treatment. This will help you decide sooner how to triage the patient.   3.Hold  opioid dose if patient is sedated  4.Individualized pain management plan for ED or ACC:                             For sickle cell pain crisis:   - SQ/IV Dilaudid 1 mg every 60 mins x 3 doses   - PO Benadryl 25mg every 6 hours prn for opioid induced pruritus  - PO Zofran 8 mg every 8 hours prn for nausea or vomiting  - Can consider cyclobenzaprine 10 mg every 8 hours prn      May admit for severe pain not controlled with above regimen or other objective findings:                          Pain crisis - if this care plan states patient has h/o chronic pain, admit at your discretion                          Fever of 38°C or higher with concern for infection                         Respiratory symptoms                          Stroke                         Non-SCD related health problems (e.g. acute abdomen, ARF, uncontrolled BP, cholecystitis)                          Greater than 1.0 g/dL drop from baseline hemoglobin                         Unexpected rise in LDH              For the remainder of the patient's workup and ED course, please see the main ED provider note.  We discussed need for diagnostic testing including lab studies and imaging.  We also discussed that they may be asked to wait in the waiting room while these test are pending.  They understand that if they choose to leave without having the testing completed or resulted that we cannot rule out acute life-threatening illnesses and the risks involved to lead to worsening condition, permanent disability or even death.        Comment: Please note this report has been produced using speech recognition software and may contain errors related to that system including errors in grammar, punctuation, and spelling, as well as words and phrases that may be inappropriate.  If there are any questions or concerns please feel free to contact the dictating provider for clarification.    Luis Bass PA-C

## 2025-06-17 NOTE — ED TRIAGE NOTES
Endorsing full body SCC pain. Also endorsing CP. States this does not always happen with her SCC. Dizziness that started 3 weeks ago. Wants to be admitted

## 2025-06-18 ENCOUNTER — APPOINTMENT (OUTPATIENT)
Dept: RADIOLOGY | Facility: HOSPITAL | Age: 55
DRG: 812 | End: 2025-06-18
Payer: COMMERCIAL

## 2025-06-18 ENCOUNTER — ANTICOAGULATION - WARFARIN VISIT (OUTPATIENT)
Dept: CARDIOLOGY | Facility: CLINIC | Age: 55
End: 2025-06-18

## 2025-06-18 DIAGNOSIS — I26.99 RECURRENT PULMONARY EMBOLISM (MULTI): Primary | ICD-10-CM

## 2025-06-18 DIAGNOSIS — I82.4Z9 DEEP VEIN THROMBOSIS (DVT) OF DISTAL VEIN OF LOWER EXTREMITY, UNSPECIFIED CHRONICITY, UNSPECIFIED LATERALITY: ICD-10-CM

## 2025-06-18 DIAGNOSIS — I82.210 THROMBOSIS OF SUPERIOR VENA CAVA (MULTI): ICD-10-CM

## 2025-06-18 LAB
ALBUMIN SERPL BCP-MCNC: 3.3 G/DL (ref 3.4–5)
ALP SERPL-CCNC: 118 U/L (ref 33–110)
ALT SERPL W P-5'-P-CCNC: 8 U/L (ref 7–45)
ANION GAP SERPL CALC-SCNC: 9 MMOL/L (ref 10–20)
APTT PPP: 29 SECONDS (ref 26–36)
AST SERPL W P-5'-P-CCNC: 13 U/L (ref 9–39)
ATRIAL RATE: 74 BPM
BASOPHILS # BLD AUTO: 0.02 X10*3/UL (ref 0–0.1)
BASOPHILS NFR BLD AUTO: 0.2 %
BILIRUB SERPL-MCNC: 1.6 MG/DL (ref 0–1.2)
BNP SERPL-MCNC: 118 PG/ML (ref 0–99)
BUN SERPL-MCNC: 13 MG/DL (ref 6–23)
CA-I BLD-SCNC: 1.18 MMOL/L (ref 1.1–1.33)
CALCIUM SERPL-MCNC: 8.6 MG/DL (ref 8.6–10.6)
CHLORIDE SERPL-SCNC: 101 MMOL/L (ref 98–107)
CO2 SERPL-SCNC: 31 MMOL/L (ref 21–32)
CREAT SERPL-MCNC: 1.15 MG/DL (ref 0.5–1.05)
EGFRCR SERPLBLD CKD-EPI 2021: 56 ML/MIN/1.73M*2
EOSINOPHIL # BLD AUTO: 0.16 X10*3/UL (ref 0–0.7)
EOSINOPHIL NFR BLD AUTO: 1.8 %
ERYTHROCYTE [DISTWIDTH] IN BLOOD BY AUTOMATED COUNT: 30.9 % (ref 11.5–14.5)
GLUCOSE SERPL-MCNC: 89 MG/DL (ref 74–99)
HCT VFR BLD AUTO: 26.2 % (ref 36–46)
HEMOGLOBIN A2: 3.1 % (ref 2–3.5)
HEMOGLOBIN A: 40.9 % (ref 95.8–98)
HEMOGLOBIN C: 27.1 %
HEMOGLOBIN F: 0.3 % (ref 0–2)
HEMOGLOBIN IDENTIFICATION INTERPRETATION: ABNORMAL
HEMOGLOBIN S: 28.6 %
HGB BLD-MCNC: 8.2 G/DL (ref 12–16)
HGB RETIC QN: 22 PG (ref 28–38)
IMM GRANULOCYTES # BLD AUTO: 0.04 X10*3/UL (ref 0–0.7)
IMM GRANULOCYTES NFR BLD AUTO: 0.4 % (ref 0–0.9)
IMMATURE RETIC FRACTION: 55.4 %
INR PPP: 1.4 (ref 0.9–1.1)
LDH SERPL L TO P-CCNC: 179 U/L (ref 84–246)
LYMPHOCYTES # BLD AUTO: 2 X10*3/UL (ref 1.2–4.8)
LYMPHOCYTES NFR BLD AUTO: 22.1 %
MCH RBC QN AUTO: 22.8 PG (ref 26–34)
MCHC RBC AUTO-ENTMCNC: 31.3 G/DL (ref 32–36)
MCV RBC AUTO: 73 FL (ref 80–100)
MONOCYTES # BLD AUTO: 0.64 X10*3/UL (ref 0.1–1)
MONOCYTES NFR BLD AUTO: 7.1 %
NEUTROPHILS # BLD AUTO: 6.2 X10*3/UL (ref 1.2–7.7)
NEUTROPHILS NFR BLD AUTO: 68.4 %
NRBC BLD-RTO: 0 /100 WBCS (ref 0–0)
P AXIS: 75 DEGREES
P OFFSET: 181 MS
P ONSET: 130 MS
PATH REVIEW-HGB IDENTIFICATION: ABNORMAL
PLATELET # BLD AUTO: 283 X10*3/UL (ref 150–450)
POTASSIUM SERPL-SCNC: 4.2 MMOL/L (ref 3.5–5.3)
PR INTERVAL: 168 MS
PROT SERPL-MCNC: 6.8 G/DL (ref 6.4–8.2)
PROTHROMBIN TIME: 15.5 SECONDS (ref 9.8–12.4)
Q ONSET: 214 MS
QRS COUNT: 13 BEATS
QRS DURATION: 76 MS
QT INTERVAL: 398 MS
QTC CALCULATION(BAZETT): 441 MS
QTC FREDERICIA: 427 MS
R AXIS: 128 DEGREES
RBC # BLD AUTO: 3.59 X10*6/UL (ref 4–5.2)
RETICS #: 0 X10*6/UL (ref 0.02–0.08)
RETICS/RBC NFR AUTO: 0.1 % (ref 0.5–2)
SODIUM SERPL-SCNC: 137 MMOL/L (ref 136–145)
T AXIS: 119 DEGREES
T OFFSET: 413 MS
VENTRICULAR RATE: 74 BPM
WBC # BLD AUTO: 9.1 X10*3/UL (ref 4.4–11.3)

## 2025-06-18 PROCEDURE — 2500000001 HC RX 250 WO HCPCS SELF ADMINISTERED DRUGS (ALT 637 FOR MEDICARE OP)

## 2025-06-18 PROCEDURE — 86902 BLOOD TYPE ANTIGEN DONOR EA: CPT

## 2025-06-18 PROCEDURE — C1769 GUIDE WIRE: HCPCS

## 2025-06-18 PROCEDURE — 85045 AUTOMATED RETICULOCYTE COUNT: CPT

## 2025-06-18 PROCEDURE — 2500000004 HC RX 250 GENERAL PHARMACY W/ HCPCS (ALT 636 FOR OP/ED): Mod: JZ,TB

## 2025-06-18 PROCEDURE — 7100000009 HC PHASE TWO TIME - INITIAL BASE CHARGE

## 2025-06-18 PROCEDURE — C1894 INTRO/SHEATH, NON-LASER: HCPCS

## 2025-06-18 PROCEDURE — 2500000004 HC RX 250 GENERAL PHARMACY W/ HCPCS (ALT 636 FOR OP/ED): Performed by: RADIOLOGY

## 2025-06-18 PROCEDURE — 85025 COMPLETE CBC W/AUTO DIFF WBC: CPT

## 2025-06-18 PROCEDURE — 99233 SBSQ HOSP IP/OBS HIGH 50: CPT | Performed by: HOSPITALIST

## 2025-06-18 PROCEDURE — 2500000005 HC RX 250 GENERAL PHARMACY W/O HCPCS

## 2025-06-18 PROCEDURE — 83615 LACTATE (LD) (LDH) ENZYME: CPT

## 2025-06-18 PROCEDURE — 1200000003 HC ONCOLOGY  ROOM WITH TELEMETRY DAILY

## 2025-06-18 PROCEDURE — 06H033Z INSERTION OF INFUSION DEVICE INTO INFERIOR VENA CAVA, PERCUTANEOUS APPROACH: ICD-10-PCS | Performed by: RADIOLOGY

## 2025-06-18 PROCEDURE — 2500000004 HC RX 250 GENERAL PHARMACY W/ HCPCS (ALT 636 FOR OP/ED): Performed by: PHYSICIAN ASSISTANT

## 2025-06-18 PROCEDURE — 2500000001 HC RX 250 WO HCPCS SELF ADMINISTERED DRUGS (ALT 637 FOR MEDICARE OP): Performed by: PHYSICIAN ASSISTANT

## 2025-06-18 PROCEDURE — 36415 COLL VENOUS BLD VENIPUNCTURE: CPT

## 2025-06-18 PROCEDURE — 77001 FLUOROGUIDE FOR VEIN DEVICE: CPT | Performed by: RADIOLOGY

## 2025-06-18 PROCEDURE — 99152 MOD SED SAME PHYS/QHP 5/>YRS: CPT

## 2025-06-18 PROCEDURE — 84075 ASSAY ALKALINE PHOSPHATASE: CPT

## 2025-06-18 PROCEDURE — 83880 ASSAY OF NATRIURETIC PEPTIDE: CPT

## 2025-06-18 PROCEDURE — 36556 INSERT NON-TUNNEL CV CATH: CPT | Performed by: RADIOLOGY

## 2025-06-18 PROCEDURE — C1752 CATH,HEMODIALYSIS,SHORT-TERM: HCPCS

## 2025-06-18 PROCEDURE — 2500000002 HC RX 250 W HCPCS SELF ADMINISTERED DRUGS (ALT 637 FOR MEDICARE OP, ALT 636 FOR OP/ED)

## 2025-06-18 PROCEDURE — 76937 US GUIDE VASCULAR ACCESS: CPT | Performed by: RADIOLOGY

## 2025-06-18 PROCEDURE — 85730 THROMBOPLASTIN TIME PARTIAL: CPT

## 2025-06-18 PROCEDURE — 2780000003 HC OR 278 NO HCPCS

## 2025-06-18 PROCEDURE — 7100000010 HC PHASE TWO TIME - EACH INCREMENTAL 1 MINUTE

## 2025-06-18 PROCEDURE — 82330 ASSAY OF CALCIUM: CPT

## 2025-06-18 PROCEDURE — 2720000007 HC OR 272 NO HCPCS

## 2025-06-18 PROCEDURE — 2500000004 HC RX 250 GENERAL PHARMACY W/ HCPCS (ALT 636 FOR OP/ED)

## 2025-06-18 RX ORDER — SODIUM CHLORIDE, SODIUM LACTATE, POTASSIUM CHLORIDE, CALCIUM CHLORIDE 600; 310; 30; 20 MG/100ML; MG/100ML; MG/100ML; MG/100ML
INJECTION, SOLUTION INTRAVENOUS CONTINUOUS PRN
Status: COMPLETED | OUTPATIENT
Start: 2025-06-18 | End: 2025-06-18

## 2025-06-18 RX ORDER — HYDROMORPHONE HYDROCHLORIDE 1 MG/ML
1 INJECTION, SOLUTION INTRAMUSCULAR; INTRAVENOUS; SUBCUTANEOUS EVERY 2 HOUR PRN
Status: DISCONTINUED | OUTPATIENT
Start: 2025-06-18 | End: 2025-06-22

## 2025-06-18 RX ORDER — FUROSEMIDE 20 MG/1
20 TABLET ORAL EVERY OTHER DAY
Status: CANCELLED | OUTPATIENT
Start: 2025-06-20

## 2025-06-18 RX ORDER — WARFARIN SODIUM 5 MG/1
5 TABLET ORAL DAILY
Status: DISCONTINUED | OUTPATIENT
Start: 2025-06-18 | End: 2025-06-23 | Stop reason: HOSPADM

## 2025-06-18 RX ORDER — FENTANYL CITRATE 50 UG/ML
INJECTION, SOLUTION INTRAMUSCULAR; INTRAVENOUS
Status: COMPLETED | OUTPATIENT
Start: 2025-06-18 | End: 2025-06-18

## 2025-06-18 RX ADMIN — METOPROLOL TARTRATE 25 MG: 50 TABLET, FILM COATED ORAL at 21:13

## 2025-06-18 RX ADMIN — WARFARIN SODIUM 5 MG: 5 TABLET ORAL at 17:43

## 2025-06-18 RX ADMIN — CETIRIZINE HYDROCHLORIDE 10 MG: 10 TABLET, FILM COATED ORAL at 08:53

## 2025-06-18 RX ADMIN — OXYCODONE HYDROCHLORIDE AND ACETAMINOPHEN 500 MG: 500 TABLET ORAL at 08:54

## 2025-06-18 RX ADMIN — SODIUM CHLORIDE, POTASSIUM CHLORIDE, SODIUM LACTATE AND CALCIUM CHLORIDE 500 ML: 600; 310; 30; 20 INJECTION, SOLUTION INTRAVENOUS at 12:06

## 2025-06-18 RX ADMIN — HYDROMORPHONE HYDROCHLORIDE 1 MG: 1 INJECTION, SOLUTION INTRAMUSCULAR; INTRAVENOUS; SUBCUTANEOUS at 20:08

## 2025-06-18 RX ADMIN — HYDROMORPHONE HYDROCHLORIDE 1 MG: 1 INJECTION, SOLUTION INTRAMUSCULAR; INTRAVENOUS; SUBCUTANEOUS at 17:43

## 2025-06-18 RX ADMIN — HYDROMORPHONE HYDROCHLORIDE 1 MG: 1 INJECTION, SOLUTION INTRAMUSCULAR; INTRAVENOUS; SUBCUTANEOUS at 02:47

## 2025-06-18 RX ADMIN — FUROSEMIDE 20 MG: 20 TABLET ORAL at 08:54

## 2025-06-18 RX ADMIN — DIPHENHYDRAMINE HYDROCHLORIDE 25 MG: 25 CAPSULE ORAL at 02:51

## 2025-06-18 RX ADMIN — HYDROMORPHONE HYDROCHLORIDE 1 MG: 1 INJECTION, SOLUTION INTRAMUSCULAR; INTRAVENOUS; SUBCUTANEOUS at 00:04

## 2025-06-18 RX ADMIN — HYDROMORPHONE HYDROCHLORIDE 1 MG: 1 INJECTION, SOLUTION INTRAMUSCULAR; INTRAVENOUS; SUBCUTANEOUS at 08:50

## 2025-06-18 RX ADMIN — METOPROLOL TARTRATE 25 MG: 50 TABLET, FILM COATED ORAL at 08:53

## 2025-06-18 RX ADMIN — HYDROMORPHONE HYDROCHLORIDE 1 MG: 1 INJECTION, SOLUTION INTRAMUSCULAR; INTRAVENOUS; SUBCUTANEOUS at 15:32

## 2025-06-18 RX ADMIN — CYCLOBENZAPRINE 10 MG: 10 TABLET, FILM COATED ORAL at 00:04

## 2025-06-18 RX ADMIN — ONDANSETRON 8 MG: 8 TABLET, ORALLY DISINTEGRATING ORAL at 14:27

## 2025-06-18 RX ADMIN — HYDROMORPHONE HYDROCHLORIDE 1 MG: 1 INJECTION, SOLUTION INTRAMUSCULAR; INTRAVENOUS; SUBCUTANEOUS at 12:48

## 2025-06-18 RX ADMIN — FOLIC ACID 1 MG: 1 TABLET ORAL at 08:54

## 2025-06-18 RX ADMIN — CALCIUM CHLORIDE 0.5 G: 100 INJECTION INTRAVENOUS; INTRAVENTRICULAR at 12:50

## 2025-06-18 RX ADMIN — HYDROMORPHONE HYDROCHLORIDE 1 MG: 1 INJECTION, SOLUTION INTRAMUSCULAR; INTRAVENOUS; SUBCUTANEOUS at 06:26

## 2025-06-18 RX ADMIN — HYDROMORPHONE HYDROCHLORIDE 1 MG: 1 INJECTION, SOLUTION INTRAMUSCULAR; INTRAVENOUS; SUBCUTANEOUS at 23:16

## 2025-06-18 RX ADMIN — FENTANYL CITRATE 50 MCG: 50 INJECTION, SOLUTION INTRAMUSCULAR; INTRAVENOUS at 12:06

## 2025-06-18 ASSESSMENT — COGNITIVE AND FUNCTIONAL STATUS - GENERAL
MOBILITY SCORE: 24
DAILY ACTIVITIY SCORE: 24

## 2025-06-18 ASSESSMENT — PAIN SCALES - GENERAL
PAINLEVEL_OUTOF10: 8
PAINLEVEL_OUTOF10: 10 - WORST POSSIBLE PAIN
PAINLEVEL_OUTOF10: 9
PAINLEVEL_OUTOF10: 7
PAINLEVEL_OUTOF10: 8
PAINLEVEL_OUTOF10: 8
PAINLEVEL_OUTOF10: 10 - WORST POSSIBLE PAIN
PAINLEVEL_OUTOF10: 9
PAINLEVEL_OUTOF10: 9
PAINLEVEL_OUTOF10: 8
PAINLEVEL_OUTOF10: 9
PAINLEVEL_OUTOF10: 6
PAINLEVEL_OUTOF10: 6
PAINLEVEL_OUTOF10: 9
PAINLEVEL_OUTOF10: 8
PAINLEVEL_OUTOF10: 9
PAINLEVEL_OUTOF10: 7
PAINLEVEL_OUTOF10: 6
PAINLEVEL_OUTOF10: 10 - WORST POSSIBLE PAIN

## 2025-06-18 ASSESSMENT — PAIN DESCRIPTION - LOCATION
LOCATION: GENERALIZED

## 2025-06-18 ASSESSMENT — ACTIVITIES OF DAILY LIVING (ADL): LACK_OF_TRANSPORTATION: YES

## 2025-06-18 NOTE — POST-PROCEDURE NOTE
Interventional Radiology Brief Postprocedure Note    Attending: David Ibarra    Diagnosis: Exchange transfusion (Sickle Cell Disease)    Description of procedure:  Image guided right common femoral approach CVC line placement    Anesthesia:  MAC Moderate    Complications: None    Estimated Blood Loss: minimal    Medications  As of 06/18/25 1241      diphenhydrAMINE (BENADryl) capsule 25 mg (mg) Total dose:  25 mg Dosing weight:  103      Date/Time Rate/Dose/Volume Action       06/18/25  0251 25 mg Given               HYDROmorphone (Dilaudid) injection 1 mg (mg) Total dose:  7 mg* Dosing weight:  103   *Administration not included in total     Date/Time Rate/Dose/Volume Action       06/17/25  1416 1 mg Given      1454 *1 mg Missed      1455 *1 mg Missed      1535 1 mg Given      2059 1 mg Given     06/18/25  0004 1 mg Given      0247 1 mg Given      0626 1 mg Given      0850 1 mg Given               cyclobenzaprine (Flexeril) tablet 10 mg (mg) Total dose:  10 mg Dosing weight:  103      Date/Time Rate/Dose/Volume Action       06/18/25  0004 10 mg Given               ascorbic acid (Vitamin C) tablet 500 mg (mg) Total dose:  1,000 mg Dosing weight:  103      Date/Time Rate/Dose/Volume Action       06/17/25  1536 500 mg Given     06/18/25  0854 500 mg Given               atropine 1 % ophthalmic solution 1 drop (drop) Total dose:  1 drop Dosing weight:  103      Date/Time Rate/Dose/Volume Action       06/17/25  2226 1 drop Given               folic acid (Folvite) tablet 1 mg (mg) Total dose:  2 mg      Date/Time Rate/Dose/Volume Action       06/17/25  1536 1 mg Given     06/18/25  0854 1 mg Given               furosemide (Lasix) tablet 20 mg (mg) Total dose:  20 mg      Date/Time Rate/Dose/Volume Action       06/18/25  0854 20 mg Given               cetirizine (ZyrTEC) tablet 10 mg (mg) Total dose:  20 mg Dosing weight:  103      Date/Time Rate/Dose/Volume Action       06/17/25  1536 10 mg Given     06/18/25  0853 10 mg  Given               metoprolol tartrate (Lopressor) tablet 25 mg (mg) Total dose:  50 mg* Dosing weight:  103   *Administration not included in total     Date/Time Rate/Dose/Volume Action       06/17/25  1536 25 mg Given      2101 *25 mg Missed     06/18/25  0853 25 mg Given               oxygen (O2) therapy (L/min) Total volume:  Not documented*   *Total volume has not been documented. View each administration to see the amount administered.     Date/Time Rate/Dose/Volume Action       06/17/25  1534  Stopped      2335 2 L/min - 120,000 mL/hr Start               white petrolatum (Aquaphor) ointment 2 Application (Application) Total dose:  0 Application*   *Administration not included in total     Date/Time Rate/Dose/Volume Action       06/17/25 2101 *2 Application Missed     06/18/25  0859 *2 Application Missed               polyethylene glycol (Glycolax, Miralax) packet 17 g (g) Total dose:  0 g* Dosing weight:  103   *Administration not included in total     Date/Time Rate/Dose/Volume Action       06/17/25  1534 *17 g Missed     06/18/25  0900 *17 g Missed               sennosides-docusate sodium (Nita-Colace) 8.6-50 mg per tablet 2 tablet (tablet) Total dose:  0 tablet* Dosing weight:  103   *Administration not included in total     Date/Time Rate/Dose/Volume Action       06/17/25 2101 *2 tablet Missed               fentaNYL PF (Sublimaze) injection (mcg) Total dose:  50 mcg      Date/Time Rate/Dose/Volume Action       06/18/25  1206 50 mcg Given               lactated Ringer's infusion (mL/hr) Total volume:  Not documented*   *Total volume has not been documented. View each administration to see the amount administered.     Date/Time Rate/Dose/Volume Action       06/18/25  1206 500 mL - 1,000 mL/hr New Bag                   No specimens collected      See detailed result report with images in PACS.    The patient tolerated the procedure well without incident or complication and is in stable condition.

## 2025-06-18 NOTE — PROGRESS NOTES
"Senait Narvaez is a 55 y.o. female on day 1 of admission presenting with Sickle cell pain crisis (Multi).    Subjective   Seen at bedside this AM. Pt reports some slight improvement in her pain from yesterday, she reports severe pain located in R shoulder. She reports no dizziness overnight. Denies urinary issues or constipation. Discussed XR results from yesterday. Also discussed plans for line placement today and exchange tomorrow. Denies fever/chills, N/V/C/D, bleeding, bruising, SOB, CP, BUNDY, swelling, H/A or vision changes.        Objective     Physical Exam  Vitals reviewed.   Constitutional:       Appearance: Normal appearance. She is obese.   HENT:      Head: Normocephalic and atraumatic.      Nose: Nose normal.      Mouth/Throat:      Mouth: Mucous membranes are moist.      Pharynx: Oropharynx is clear.   Eyes:      Extraocular Movements: Extraocular movements intact.      Pupils: Pupils are equal, round, and reactive to light.   Cardiovascular:      Rate and Rhythm: Normal rate and regular rhythm.      Pulses: Normal pulses.      Heart sounds: Normal heart sounds.   Pulmonary:      Effort: Pulmonary effort is normal.      Breath sounds: Normal breath sounds.   Abdominal:      General: Bowel sounds are normal.      Palpations: Abdomen is soft.   Musculoskeletal:         General: Normal range of motion.   Skin:     General: Skin is warm.   Neurological:      General: No focal deficit present.      Mental Status: She is alert and oriented to person, place, and time. Mental status is at baseline.   Psychiatric:         Mood and Affect: Mood normal.         Behavior: Behavior normal.         Last Recorded Vitals  Blood pressure 96/62, pulse 72, temperature 36.3 °C (97.3 °F), temperature source Temporal, resp. rate 18, height 1.651 m (5' 5\"), weight 96.5 kg (212 lb 11.9 oz), SpO2 94%.  Intake/Output last 3 Shifts:  No intake/output data recorded.    Relevant Results  Scheduled medications  Scheduled " Medications[1]  Continuous medications  Continuous Medications[2]  PRN medications  PRN Medications[3]     Assessment & Plan  Sickle cell pain crisis (Multi)    Senait Narvaez is a 55 y.o. 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 CXR (6/17) neg for acute process. Hgb and lysis labs near baseline on admit. Started on IV Dilaudid per care path. Transfusion med consulted for routine inpatient exchange transfusion. Exchange labs ordered. S/p femoral apheresis line placement 6/18, plan for RBCex tomorrow, 6/19. R shoulder xray (6/17) negative. DC pending exchange transfusion and pain control.     Updates 6/18:  - xray R shoulder (6/17) negative  - calcium chloride 0.5mg given for ionized CA 1.09, repeat calcium ordered for evening draw  - BNP (6/18) 118  - orthostatic vitals pending  - parvo ordered, pending  - lactate ordered, pending  - plan to restart warfarin after line placement    # Dizziness  # Syncope  - likely 2/2 cardiogenic causes vs dehydration   - pt reports dizziness (started ~3 weeks ago) with one episode of syncope 3 weeks ago  - EKG on admit (6/17) NSR with R axis deviation with R ventricular hypertrophy  - Last TTE 5/9/25: HFpEF with EF of 67%, severely enlarged RV with decreased function, elevated RVSP  - troponin on admit (6/17) 4, repeat troponin pending (6/18)  - orthostatic vitals ordered (6/18) pending  - BNP (6/18) 118  - lactate (6/18) pending  - can consider RHC if symptoms continue  - telemetry ordered     # Hb SC disease   # Acute on chronic SCD related pain crisis  # routine inpatient  exchange  - currently on q6 week RBC exchange transfusions (most recent outpatient 4/18 and inpatient 5/8)  - Carepath reviewed, last updated 4/16/25: IV Dilaudid 1-1.5 mg every 2-3 hours prn for severe pain   - OARRS reviewed, no aberrant behavior noted   - CXR (6/17) neg for acute process  - EKG (6/17) NSR with R axis deviation with R ventricular hypertrophy  - Ortho BP pending (6/18)  - Covid/Flu negative (6/17)  - Hgb BL ~ 8, Hgb 9.4 on admission (6/17) - no indication for simple transfusion  - tbili BL ~ 1.0, 1.7 on admission (6/17)--> 1.6 (6/18)  - LDH ~150-200,  on admission (6/17)--> 179 (6/18)  - s/p IV Dilaudid 1mg q3h PRN for severe pain (6/17-6/18)  - start IV Dilaudid 1mg Q2 PRN for severe pain (6/18-current)  - Cyclobenzaprine 10 mg TID PRN   - Bowel regimen for opioid induced constipation with Senna 2tabs nightly and Miralax daily, PO Benadryl 25 mg q6h PRN for opioid-induced pruritus, PO Zofran 8 mg q8h PRN for opioid-induced nausea  - c/w home folic acid 1 mg daily  - Utox pending (6/17)  - Hgb S pending (6/17)  - Exchange labs ordered 6/17  - Transfusion med consulted per Dr. Whaley (6/17), plan routine inpatient exchange 6/19  - s/p femoral apheresis line placement 6/18  - ionized CA 1.09 (6/17), IV calcium chloride 0.5mg ordered, repeat RNP ordered for evening draw  - Parvo ordered (6/18) pending  - R shoulder xray (6/17) negative  - ESR 1.18/CRP 14 (6/17)  - IS encouraged    # HFpEF  # hx pHTN  # hx of troponinemia   - Last TTE 5/9/25: HFpEF with EF of 67% right ventricular overloaded, severely enlarged RV with decreased function, elevated RVSP  - hx of elevated troponins on admissions, likely leak i/s/o demand  - on lasix 20 every other day at home  - troponin (6/17) negative, repeat troponin (6/18) pending     # hx of DVT/PE  # hx of SVC syndrome  - home warfarin held on admission for apheresis line placement, RESTARTED post fem line placement for exchange (6/18)  - SCDs  - Daily Coags to  monitor INR    # hx of SVT  - continue home metoprolol tartrate 25 mg BID  - tele     #Dispo  - Full code, confirmed on admit  - DC pending routine exchange and pain control     I spent 90 minutes in the professional and overall care of this patient.     Assessment and plan as above discussed with attending physician Dr. Hensley.    Leanna Steven, APRN-CNP         [1] ascorbic acid, 500 mg, oral, Daily  atropine, 1 drop, Left Eye, Nightly  calcium chloride, 0.5 g, intravenous, Once  cetirizine, 10 mg, oral, Daily  folic acid, 1 mg, oral, Daily  furosemide, 20 mg, oral, Every other day  metoprolol tartrate, 25 mg, oral, BID  polyethylene glycol, 17 g, oral, Daily  sennosides-docusate sodium, 2 tablet, oral, Nightly  white petrolatum, 2 Application, Topical, BID  [2] oxygen, , Last Rate: 2 L/min (06/17/25 2335)  [3] PRN medications: albuterol, benzocaine-menthol, cyclobenzaprine, diphenhydrAMINE, fluticasone, HYDROmorphone, ondansetron ODT, phenoL

## 2025-06-18 NOTE — CARE PLAN
The patient's goals for the shift include    Problem: Pain - Adult  Goal: Verbalizes/displays adequate comfort level or baseline comfort level  Outcome: Progressing     Problem: Safety - Adult  Goal: Free from fall injury  Outcome: Progressing     Problem: Discharge Planning  Goal: Discharge to home or other facility with appropriate resources  Outcome: Progressing     Problem: Chronic Conditions and Co-morbidities  Goal: Patient's chronic conditions and co-morbidity symptoms are monitored and maintained or improved  Outcome: Progressing     Problem: Nutrition  Goal: Nutrient intake appropriate for maintaining nutritional needs  Outcome: Progressing       The clinical goals for the shift include To have adequate pain control

## 2025-06-18 NOTE — PRE-PROCEDURE NOTE
Interventional Radiology Preprocedure Note    Indication for procedure: The encounter diagnosis was Sickle cell pain crisis (Multi).    Relevant review of systems: NA    Relevant Labs:   Lab Results   Component Value Date    CREATININE 1.15 (H) 06/18/2025    EGFR 56 (L) 06/18/2025    INR 1.4 (H) 06/18/2025    PROTIME 15.5 (H) 06/18/2025       Planned Sedation/Anesthesia: Minimal    Airway assessment: normal    Directed physical examination:    AOX3    Mallampati: II (hard and soft palate, upper portion of tonsils and uvula visible)    ASA Score: ASA 3 - Patient with moderate systemic disease with functional limitations    Benefits, risks and alternatives of procedure and planned sedation have been discussed with the patient and/or their representative. All questions answered and they agree to proceed.

## 2025-06-18 NOTE — CONSULTS
"Nutrition Initial Assessment:   Nutrition Assessment    --->Reason for Assessment: Admission nursing screening    Patient is a 55 y.o. female with h/o Sickle Cell Disease, CKD2, admitted d/t Sickle Cell pains.    Nutrition History:  This writer met with pt earlier today, was sitting up in bed at time of visit with her.    Tells appetite has been variable x a very long time. When she is in pain she does not eat, finds liquids are easier to take in v solids. When not in pain, feels she eats better.    Recently bought some Ensure supplements for at home. Oral nutrition supplements are something she has done in the past but not consistently.    Since admit, PO decreased d/t pain. Was able to eat a few bites of her eggs and yogurt along with 2 slices of moody this am for breakfast meal.    Denied any issues with n/v/d or trouble chewing/swallowing.    --Vitamin/Herbal Supplement Use: MVI, C, Folic Acid--per pt and review of home meds in EMR  --Food Allergy: pt denies       Anthropometrics:  Height: 165.1 cm (5' 5\")   Weight: 96.5 kg (212 lb 11.9 oz)   BMI (Calculated): 35.4  IBW/kg (Dietitian Calculated): 56.8 kg  Percent of IBW: 170 %    Weight History:   --pt reports she does have a h/o ~50 lb wt loss ~6-8 mos ago, though does feel her wt has been stabilizing x the last couple of mos    Wt Readings per review of EMR:   06/18/25 96.5 kg (212 lb 11.9 oz) (current wt)   05/11/25 107 kg (235 lb 0.2 oz)--?   04/16/25 99 kg (218 lb 4.1 oz)   04/07/25 96.6 kg (212 lb 15.4 oz)   03/10/25 97.1 kg (214 lb 1.6 oz)   03/02/25 95.7 kg (210 lb 15.7 oz)   02/24/25 95.7 kg (211 lb)   01/19/25 97.1 kg (214 lb)   01/17/25 97.3 kg (214 lb 8.1 oz)   12/30/24 93.2 kg (205 lb 6.4 oz)   12/12/24 107 kg (236 lb)--->10% wt loss from this date to present (significant)   11/07/24 108 kg (237 lb)   10/31/24 108 kg (238 lb 12.8 oz)   10/05/24 113 kg (248 lb 7.3 oz)   09/10/24 104 kg (229 lb 4.5 oz)   08/13/24 104 kg (229 lb 3.2 oz)   08/12/24 106 " kg (233 lb 8 oz)   07/17/24 103 kg (226 lb)   06/19/24 108 kg (237 lb 10.5 oz)   06/10/24 109 kg (240 lb)   06/10/24 110 kg (243 lb 6.2 oz)--->12% wt loss from this date to present (not significant)   05/08/24 108 kg (237 lb 1.6 oz)   04/10/24 110 kg (242 lb 9.6 oz)   02/28/24 109 kg (240 lb 4.8 oz)   02/28/24 109 kg (240 lb 8.4 oz)   02/27/24 109 kg (239 lb 3.2 oz)   02/13/24 110 kg (242 lb)   01/16/24 108 kg (237 lb 14.4 oz)   01/15/24 110 kg (242 lb)   01/11/24 110 kg (242 lb)   --in review of weights above, it appears pt wt has been stable x ~5 mos    Nutrition Focused Physical Exam Findings:  Subcutaneous Fat Loss:   Orbital Fat Pads: Well nourished (slightly bulging fat pads)  Buccal Fat Pads: Well nourished (full, rounded cheeks)  Triceps: Well nourished (ample fat tissue)  Ribs: Defer  Muscle Wasting:  Temporalis: Well nourished (well-defined muscle)  Pectoralis (Clavicular Region): Well nourished (clavicle not visible)  Deltoid/Trapezius: Well nourished (rounded appearance at arm, shoulder, neck)  Interosseous: Well nourished (muscle bulges)  Trapezius/Infraspinatus/Supraspinatus (Scapular Region): Defer  Quadriceps: Defer  Gastrocnemius: Defer  Edema:  Edema: none  Physical Findings:  Hair: Negative  Skin: Negative    Nutrition Significant Labs:  CBC Trend:   Results from last 7 days   Lab Units 06/18/25  0708 06/17/25  1101   WBC AUTO x10*3/uL 9.1 10.5   RBC AUTO x10*6/uL 3.59* 4.11   HEMOGLOBIN g/dL 8.2* 9.4*   HEMATOCRIT % 26.2* 28.6*   MCV fL 73* 70*   PLATELETS AUTO x10*3/uL 283 330   BMP Trend:   Results from last 7 days   Lab Units 06/18/25  0708 06/17/25  1101   GLUCOSE mg/dL 89 97   CALCIUM mg/dL 8.6 9.1   SODIUM mmol/L 137 136   POTASSIUM mmol/L 4.2 3.7   CO2 mmol/L 31 30   CHLORIDE mmol/L 101 100   BUN mg/dL 13 7   CREATININE mg/dL 1.15* 0.98   Liver Function Trend:   Results from last 7 days   Lab Units 06/18/25  0708 06/17/25  1101   ALK PHOS U/L 118* 132*   AST U/L 13 12   ALT U/L 8 8    BILIRUBIN TOTAL mg/dL 1.6* 1.7*   Renal Lab Trend:   Results from last 7 days   Lab Units 06/18/25  0708 06/17/25  1101   POTASSIUM mmol/L 4.2 3.7   SODIUM mmol/L 137 136   EGFR mL/min/1.73m*2 56* 68   BUN mg/dL 13 7   CREATININE mg/dL 1.15* 0.98   Vit D:   Lab Results   Component Value Date    VITD25 8 (L) 09/11/2024   Vit B12:   Lab Results   Component Value Date    HCWCAQQT87 341 02/22/2023   Folate:   Lab Results   Component Value Date    FOLATE 11.2 02/22/2023      Medications:  Scheduled medications  Scheduled Medications[1]  Continuous medications  Continuous Medications[2]  PRN medications  PRN Medications[3]    I/O:   Last BM Date: 06/17/25    Dietary Orders (From admission, onward)       Start     Ordered    06/18/25 1310  Oral nutritional supplements  Until discontinued        Comments: pt may have Ensure Plus more than 2 times/day, if requested   Question Answer Comment   Deliver with Breakfast    Deliver with Dinner    Select supplement: Ensure Plus        06/18/25 1310    06/17/25 2236  May Participate in Room Service  ( ROOM SERVICE MAY PARTICIPATE)  Once        Question:  .  Answer:  Yes    06/17/25 2235 06/17/25 1443  Adult diet Regular  Diet effective now        Question:  Diet type  Answer:  Regular    06/17/25 1442                Estimated Needs:   Total Energy Estimated Needs in 24 hours (kCal): ~9663-2550  Method for Estimating Needs: MSJ (1559) x ~1.1-1.2 (using 96.5 kg)  Total Protein Estimated Needs in 24 Hours (g): 75-90  Method for Estimating 24 Hour Protein Needs: 57 (IBW) x ~1.3-1.5 g/kg  Total Fluid Estimated Needs in 24 Hours (mL): 1ml/kcal or per MD/team        Nutrition Diagnosis   Malnutrition Diagnosis  Patient has Malnutrition Diagnosis: Yes  Diagnosis Status: New  Malnutrition Diagnosis: Moderate malnutrition related to chronic disease or condition  Related to: sickle cell disease  As Evidenced by: pt reports of variable PO x many months with 10% wt loss in ~6  mos  Additional Assessment Information: Overall nutritional status may improve, if weight remains stable. Considering malnutrition, did offer to provide oral nutrition supplements in-house; pt agreeable to Ensure Plus BID.       Nutrition Interventions/Recommendations   Nutrition prescription for oral nutrition    Nutrition Recommendations:  1. Continue Regular Diet, only as tolerated  --RDN placed order for Ensure Plus BID for added nutrition    2. Check updated 25(OH)-D level and supplement, if needed    3. Please weigh pt at least once/week (standing preferred, if feasible)    Nutrition Interventions/Goals:   Meals and Snacks: General healthful diet  Goal: Regular Diet  Medical Food Supplement: Commercial beverage medical food supplement therapy  Goal: Ensure Plus BID (350 kcals, 13g pro each)    Education:  Education Documentation  No documentation found.    Encouraged ongoing PO + protein. Reviewed oral nutrition supplements are a good way to get nutrition in, especially when she does feel like she is able to eat too much during the day. Pt denied any questions for RDN at this time.       Nutrition Monitoring and Evaluation   Intake / Amount of food: Consumes at least 50% or more of meals/snacks/supplements    Body Weight: Body weight - Maintain stable weight    Electrolyte and Renal Panel: Electrolytes within normal limits  Glucose/Endocrine Profile: Glucose within normal limits ( mg/dL)      Goal Status: New goal(s) identified          Time Spent (min): 45 minutes            [1] ascorbic acid, 500 mg, oral, Daily  atropine, 1 drop, Left Eye, Nightly  cetirizine, 10 mg, oral, Daily  folic acid, 1 mg, oral, Daily  furosemide, 20 mg, oral, Every other day  metoprolol tartrate, 25 mg, oral, BID  polyethylene glycol, 17 g, oral, Daily  sennosides-docusate sodium, 2 tablet, oral, Nightly  warfarin, 5 mg, oral, Daily  white petrolatum, 2 Application, Topical, BID     [2] oxygen, , Last Rate: 2 L/min (06/17/25  6281)     [3] PRN medications: albuterol, benzocaine-menthol, cyclobenzaprine, diphenhydrAMINE, fluticasone, HYDROmorphone, ondansetron ODT, phenoL

## 2025-06-18 NOTE — CARE PLAN
The clinical goals for the shift include pt will remain HDS and free from falls      Problem: Pain - Adult  Goal: Verbalizes/displays adequate comfort level or baseline comfort level  Outcome: Progressing     Problem: Safety - Adult  Goal: Free from fall injury  Outcome: Progressing     Problem: Discharge Planning  Goal: Discharge to home or other facility with appropriate resources  Outcome: Progressing     Problem: Chronic Conditions and Co-morbidities  Goal: Patient's chronic conditions and co-morbidity symptoms are monitored and maintained or improved  Outcome: Progressing     Problem: Nutrition  Goal: Nutrient intake appropriate for maintaining nutritional needs  Outcome: Progressing

## 2025-06-18 NOTE — PROGRESS NOTES
06/18/25 1600   Discharge Planning   Living Arrangements Alone   Support Systems Children;Family members;Parent;Friends/neighbors   Assistance Needed has home noc O2   Type of Residence Private residence   Number of Stairs to Enter Residence 0   Number of Stairs Within Residence 0   Do you have animals or pets at home? No   Who is requesting discharge planning? Patient   Home or Post Acute Services None   Expected Discharge Disposition Home   Does the patient need discharge transport arranged? No   Financial Resource Strain   How hard is it for you to pay for the very basics like food, housing, medical care, and heating? Not very   Housing Stability   In the last 12 months, was there a time when you were not able to pay the mortgage or rent on time? N   In the past 12 months, how many times have you moved where you were living? 0   At any time in the past 12 months, were you homeless or living in a shelter (including now)? N   Transportation Needs   In the past 12 months, has lack of transportation kept you from medical appointments or from getting medications? yes   In the past 12 months, has lack of transportation kept you from meetings, work, or from getting things needed for daily living? Yes     Care Transitions Assessment 6/18    Plan per Medical/Surgical Team: admitted for sickle cell pain management, rbc exchange  Status: inpatient  Payor Source: [facility]  Discharge disposition: [facility]  Expected date of discharge: 6/21  Barriers: none at this time  PCP / Primary Oncologist: Montez OSBORN  Preferred Pharmacy: [facility]  Preferred home care agency: N/A    Met with patient at bedside, introduced self and role. Demographics and insurance verifed with patient. Pt plans to return home alone with assistance from supportive family including her mom who usually drives her to appts as needed. Pt reports she sometimes misses appts if her mom isn't feeling well and can't bring her. LSW suggested options of provide  a ride through her insurance or paratransit as an alternative. Pt reports she knows how to use provide a ride, but they haven't shown up when she has tried to use them, but expressed interest in paratransit. LSW provided information and application. Pt reports she has home nocturnal O2, declines any additional HHC/DME use or need at this time. Will continue to follow for any discharge planning needs.

## 2025-06-18 NOTE — PROGRESS NOTES
Spiritual Care Visit  Spiritual Care Request    Reason for Visit:  Routine Visit: Follow-up  Continue Visiting: Yes     Focus of Care:  Visited With: Patient       Spiritual Care Annotation    Annotation:   visited patient Senait Narvaez. Patient shared she was experiencing pain and expecting her blood exchange tomorrow. Patient shared she felt blessed and remains as joyful as possible due to her komal. Patient reflected on her plans for the summer, including travel and spending time with loved ones.  provided care through hospitality and supportive conversation. Patient was appreciative and did not have any needs at this time. Spiritual Care remains available as needed/requested.    Rev. Anita Pandya MDiv, BCC

## 2025-06-18 NOTE — PROGRESS NOTES
attempted to visit patient Senait Narvaez. Patient was not in room at time of visit.  will follow up at another time and Spiritual Care remains available as needed/requested.    Rev. Anita Pandya MDiv, BCC

## 2025-06-18 NOTE — PROGRESS NOTES
I assumed care of pt this am. Attempted to see x 2, but off floor.     55 yOF w/ PMH notable for SCDx (c/b AVN, ACS; on routine exchanges), HFpEF, pHTN (on b/l 3L NC), SVC thrombus (on coumadin) presenting 6/17 for ~3 weeks dizziness/?syncopal episode and acute/chronic R shoulder pain. Currently afebrile/HDS/on baseline 3L NC.    #Syncope  #Dizziness  -Unclear etiology; concerning for cardiogenic causes (ADHF, worsening pHTN, arrhythmia; low suspicion of PE) vs dehydration/poor po intake vs neurogenic (vertigo vs BPPV)  -Currently HDS (slightly more hypotensive) on b/l 3L; per report denies any further dizziness/syncope, CP, SOB, palpitations, LE edema, h/a, n/v, f/c; warm/euvolemic  -EKG with worsening RAD, deepening TWI in V2-V5; prior TTE 5/2025 with stable EF, decreased RV function with elevated RVSP   -CXR with stable cardiomegaly, no acute findings; hold off on CTPE for now  -Tele, orthostatics, ambulatory O2s, add on BNP/trop  -Pending workup consider cards consult and potential RHC  -IVF bolus; hold diuretics   -Cut home metop to 12.5mg BID (half dose)     #SCDx  #Uncomplicated Pain Crisis  -Hgb/lysis labs stable; retic count noted to be 0  -Check parvo  -Plan for line placement and routine exchange inpt     #SVC Thrombus  -Hold home coumadin for IR line placement  -Will need to be bridged with lovenox back to home coumadin dose

## 2025-06-19 ENCOUNTER — APPOINTMENT (OUTPATIENT)
Dept: OTHER | Facility: HOSPITAL | Age: 55
End: 2025-06-19
Payer: COMMERCIAL

## 2025-06-19 ENCOUNTER — APPOINTMENT (OUTPATIENT)
Dept: OTHER | Facility: HOSPITAL | Age: 55
DRG: 812 | End: 2025-06-19
Payer: COMMERCIAL

## 2025-06-19 LAB
ALBUMIN SERPL BCP-MCNC: 3.4 G/DL (ref 3.4–5)
ALP SERPL-CCNC: 127 U/L (ref 33–110)
ALT SERPL W P-5'-P-CCNC: 8 U/L (ref 7–45)
ANION GAP SERPL CALC-SCNC: 10 MMOL/L (ref 10–20)
APTT PPP: 31 SECONDS (ref 26–36)
AST SERPL W P-5'-P-CCNC: 13 U/L (ref 9–39)
BASOPHILS # BLD AUTO: 0.02 X10*3/UL (ref 0–0.1)
BASOPHILS NFR BLD AUTO: 0.2 %
BILIRUB SERPL-MCNC: 1.4 MG/DL (ref 0–1.2)
BLOOD EXPIRATION DATE: NORMAL
BUN SERPL-MCNC: 21 MG/DL (ref 6–23)
CA-I BLD-SCNC: 1.06 MMOL/L (ref 1.1–1.33)
CA-I BLD-SCNC: 1.2 MMOL/L (ref 1.1–1.33)
CALCIUM SERPL-MCNC: 8.9 MG/DL (ref 8.6–10.6)
CHLORIDE SERPL-SCNC: 98 MMOL/L (ref 98–107)
CO2 SERPL-SCNC: 31 MMOL/L (ref 21–32)
CREAT SERPL-MCNC: 1.39 MG/DL (ref 0.5–1.05)
DISPENSE STATUS: NORMAL
EGFRCR SERPLBLD CKD-EPI 2021: 45 ML/MIN/1.73M*2
EOSINOPHIL # BLD AUTO: 0.41 X10*3/UL (ref 0–0.7)
EOSINOPHIL NFR BLD AUTO: 4.2 %
ERYTHROCYTE [DISTWIDTH] IN BLOOD BY AUTOMATED COUNT: 25.6 % (ref 11.5–14.5)
ERYTHROCYTE [DISTWIDTH] IN BLOOD BY AUTOMATED COUNT: 31.3 % (ref 11.5–14.5)
GLUCOSE SERPL-MCNC: 85 MG/DL (ref 74–99)
HCT VFR BLD AUTO: 26.5 % (ref 36–46)
HCT VFR BLD AUTO: 29.4 % (ref 36–46)
HGB BLD-MCNC: 8.2 G/DL (ref 12–16)
HGB BLD-MCNC: 9.4 G/DL (ref 12–16)
HGB RETIC QN: 22 PG (ref 28–38)
IMM GRANULOCYTES # BLD AUTO: 0.05 X10*3/UL (ref 0–0.7)
IMM GRANULOCYTES NFR BLD AUTO: 0.5 % (ref 0–0.9)
IMMATURE RETIC FRACTION: 63.8 %
INR PPP: 1.4 (ref 0.9–1.1)
LACTATE SERPL-SCNC: 0.6 MMOL/L (ref 0.4–2)
LDH SERPL L TO P-CCNC: 169 U/L (ref 84–246)
LYMPHOCYTES # BLD AUTO: 1.49 X10*3/UL (ref 1.2–4.8)
LYMPHOCYTES NFR BLD AUTO: 15.1 %
MCH RBC QN AUTO: 22.8 PG (ref 26–34)
MCH RBC QN AUTO: 24.7 PG (ref 26–34)
MCHC RBC AUTO-ENTMCNC: 30.9 G/DL (ref 32–36)
MCHC RBC AUTO-ENTMCNC: 32 G/DL (ref 32–36)
MCV RBC AUTO: 74 FL (ref 80–100)
MCV RBC AUTO: 77 FL (ref 80–100)
MONOCYTES # BLD AUTO: 0.63 X10*3/UL (ref 0.1–1)
MONOCYTES NFR BLD AUTO: 6.4 %
NEUTROPHILS # BLD AUTO: 7.27 X10*3/UL (ref 1.2–7.7)
NEUTROPHILS NFR BLD AUTO: 73.6 %
NRBC BLD-RTO: 0 /100 WBCS (ref 0–0)
NRBC BLD-RTO: 5.7 /100 WBCS (ref 0–0)
PLATELET # BLD AUTO: 155 X10*3/UL (ref 150–450)
PLATELET # BLD AUTO: 283 X10*3/UL (ref 150–450)
POTASSIUM SERPL-SCNC: 4.5 MMOL/L (ref 3.5–5.3)
PRODUCT BLOOD TYPE: 1700
PRODUCT BLOOD TYPE: 1700
PRODUCT BLOOD TYPE: 7300
PRODUCT CODE: NORMAL
PROT SERPL-MCNC: 7.1 G/DL (ref 6.4–8.2)
PROTHROMBIN TIME: 15.2 SECONDS (ref 9.8–12.4)
RBC # BLD AUTO: 3.59 X10*6/UL (ref 4–5.2)
RBC # BLD AUTO: 3.8 X10*6/UL (ref 4–5.2)
RETICS #: 0 X10*6/UL (ref 0.02–0.08)
RETICS/RBC NFR AUTO: 0.1 % (ref 0.5–2)
SODIUM SERPL-SCNC: 134 MMOL/L (ref 136–145)
UNIT ABO: NORMAL
UNIT NUMBER: NORMAL
UNIT RH: NORMAL
UNIT VOLUME: 350
WBC # BLD AUTO: 6.7 X10*3/UL (ref 4.4–11.3)
WBC # BLD AUTO: 9.9 X10*3/UL (ref 4.4–11.3)
XM INTEP: NORMAL

## 2025-06-19 PROCEDURE — 99233 SBSQ HOSP IP/OBS HIGH 50: CPT

## 2025-06-19 PROCEDURE — 36415 COLL VENOUS BLD VENIPUNCTURE: CPT

## 2025-06-19 PROCEDURE — 2500000001 HC RX 250 WO HCPCS SELF ADMINISTERED DRUGS (ALT 637 FOR MEDICARE OP)

## 2025-06-19 PROCEDURE — 80053 COMPREHEN METABOLIC PANEL: CPT

## 2025-06-19 PROCEDURE — 36512 APHERESIS RBC: CPT

## 2025-06-19 PROCEDURE — 82330 ASSAY OF CALCIUM: CPT

## 2025-06-19 PROCEDURE — 2500000001 HC RX 250 WO HCPCS SELF ADMINISTERED DRUGS (ALT 637 FOR MEDICARE OP): Performed by: PHYSICIAN ASSISTANT

## 2025-06-19 PROCEDURE — 2500000004 HC RX 250 GENERAL PHARMACY W/ HCPCS (ALT 636 FOR OP/ED): Mod: JZ,TB

## 2025-06-19 PROCEDURE — 2500000004 HC RX 250 GENERAL PHARMACY W/ HCPCS (ALT 636 FOR OP/ED)

## 2025-06-19 PROCEDURE — 85045 AUTOMATED RETICULOCYTE COUNT: CPT

## 2025-06-19 PROCEDURE — 83605 ASSAY OF LACTIC ACID: CPT

## 2025-06-19 PROCEDURE — 1200000003 HC ONCOLOGY  ROOM WITH TELEMETRY DAILY

## 2025-06-19 PROCEDURE — 85027 COMPLETE CBC AUTOMATED: CPT

## 2025-06-19 PROCEDURE — 83021 HEMOGLOBIN CHROMOTOGRAPHY: CPT

## 2025-06-19 PROCEDURE — P9040 RBC LEUKOREDUCED IRRADIATED: HCPCS

## 2025-06-19 PROCEDURE — 86747 PARVOVIRUS ANTIBODY: CPT

## 2025-06-19 PROCEDURE — 87081 CULTURE SCREEN ONLY: CPT

## 2025-06-19 PROCEDURE — 2500000005 HC RX 250 GENERAL PHARMACY W/O HCPCS

## 2025-06-19 PROCEDURE — 2500000002 HC RX 250 W HCPCS SELF ADMINISTERED DRUGS (ALT 637 FOR MEDICARE OP, ALT 636 FOR OP/ED)

## 2025-06-19 PROCEDURE — 83615 LACTATE (LD) (LDH) ENZYME: CPT

## 2025-06-19 PROCEDURE — 85610 PROTHROMBIN TIME: CPT

## 2025-06-19 PROCEDURE — 85025 COMPLETE CBC W/AUTO DIFF WBC: CPT

## 2025-06-19 RX ORDER — HEPARIN SODIUM 1000 [USP'U]/ML
1000 INJECTION, SOLUTION INTRAVENOUS; SUBCUTANEOUS ONCE
Status: COMPLETED | OUTPATIENT
Start: 2025-06-19 | End: 2025-06-19

## 2025-06-19 RX ORDER — CALCIUM CARBONATE 200(500)MG
1500 TABLET,CHEWABLE ORAL EVERY 5 MIN PRN
Status: DISCONTINUED | OUTPATIENT
Start: 2025-06-19 | End: 2025-06-19 | Stop reason: HOSPADM

## 2025-06-19 RX ORDER — DIPHENHYDRAMINE HCL 25 MG
25 CAPSULE ORAL ONCE
Status: COMPLETED | OUTPATIENT
Start: 2025-06-19 | End: 2025-06-19

## 2025-06-19 RX ORDER — HEPARIN SODIUM,PORCINE/PF 10 UNIT/ML
50 SYRINGE (ML) INTRAVENOUS EVERY 8 HOURS
Status: DISCONTINUED | OUTPATIENT
Start: 2025-06-19 | End: 2025-06-23 | Stop reason: HOSPADM

## 2025-06-19 RX ORDER — DIPHENHYDRAMINE HYDROCHLORIDE 50 MG/ML
25 INJECTION, SOLUTION INTRAMUSCULAR; INTRAVENOUS EVERY 5 MIN PRN
Status: DISCONTINUED | OUTPATIENT
Start: 2025-06-19 | End: 2025-06-19 | Stop reason: HOSPADM

## 2025-06-19 RX ORDER — ACETAMINOPHEN 325 MG/1
650 TABLET ORAL ONCE
Status: COMPLETED | OUTPATIENT
Start: 2025-06-19 | End: 2025-06-19

## 2025-06-19 RX ORDER — METOPROLOL TARTRATE 25 MG/1
12.5 TABLET, FILM COATED ORAL 2 TIMES DAILY
Status: DISCONTINUED | OUTPATIENT
Start: 2025-06-19 | End: 2025-06-20

## 2025-06-19 RX ORDER — HEPARIN SODIUM,PORCINE/PF 10 UNIT/ML
50 SYRINGE (ML) INTRAVENOUS AS NEEDED
Status: DISCONTINUED | OUTPATIENT
Start: 2025-06-19 | End: 2025-06-23 | Stop reason: HOSPADM

## 2025-06-19 RX ADMIN — ATROPINE SULFATE 1 DROP: 10 SOLUTION/ DROPS OPHTHALMIC at 21:44

## 2025-06-19 RX ADMIN — HYDROMORPHONE HYDROCHLORIDE 1 MG: 1 INJECTION, SOLUTION INTRAMUSCULAR; INTRAVENOUS; SUBCUTANEOUS at 18:56

## 2025-06-19 RX ADMIN — ACETAMINOPHEN 650 MG: 325 TABLET ORAL at 10:32

## 2025-06-19 RX ADMIN — FOLIC ACID 1 MG: 1 TABLET ORAL at 08:53

## 2025-06-19 RX ADMIN — HYDROMORPHONE HYDROCHLORIDE 1 MG: 1 INJECTION, SOLUTION INTRAMUSCULAR; INTRAVENOUS; SUBCUTANEOUS at 12:22

## 2025-06-19 RX ADMIN — CALCIUM GLUCONATE 0.5 G: 98 INJECTION, SOLUTION INTRAVENOUS at 16:23

## 2025-06-19 RX ADMIN — HYDROMORPHONE HYDROCHLORIDE 1 MG: 1 INJECTION, SOLUTION INTRAMUSCULAR; INTRAVENOUS; SUBCUTANEOUS at 04:56

## 2025-06-19 RX ADMIN — METOPROLOL TARTRATE 12.5 MG: 25 TABLET, FILM COATED ORAL at 08:53

## 2025-06-19 RX ADMIN — HYDROMORPHONE HYDROCHLORIDE 1 MG: 1 INJECTION, SOLUTION INTRAMUSCULAR; INTRAVENOUS; SUBCUTANEOUS at 10:21

## 2025-06-19 RX ADMIN — CALCIUM GLUCONATE 25 ML/HR: 20 INJECTION, SOLUTION INTRAVENOUS at 11:15

## 2025-06-19 RX ADMIN — OXYCODONE HYDROCHLORIDE AND ACETAMINOPHEN 500 MG: 500 TABLET ORAL at 08:53

## 2025-06-19 RX ADMIN — HEPARIN SODIUM 1000 UNITS: 1000 INJECTION INTRAVENOUS; SUBCUTANEOUS at 12:40

## 2025-06-19 RX ADMIN — CETIRIZINE HYDROCHLORIDE 10 MG: 10 TABLET, FILM COATED ORAL at 08:53

## 2025-06-19 RX ADMIN — METOPROLOL TARTRATE 12.5 MG: 25 TABLET, FILM COATED ORAL at 21:10

## 2025-06-19 RX ADMIN — HYDROMORPHONE HYDROCHLORIDE 1 MG: 1 INJECTION, SOLUTION INTRAMUSCULAR; INTRAVENOUS; SUBCUTANEOUS at 14:41

## 2025-06-19 RX ADMIN — HYDROMORPHONE HYDROCHLORIDE 1 MG: 1 INJECTION, SOLUTION INTRAMUSCULAR; INTRAVENOUS; SUBCUTANEOUS at 16:57

## 2025-06-19 RX ADMIN — HYDROMORPHONE HYDROCHLORIDE 1 MG: 1 INJECTION, SOLUTION INTRAMUSCULAR; INTRAVENOUS; SUBCUTANEOUS at 07:43

## 2025-06-19 RX ADMIN — HYDROMORPHONE HYDROCHLORIDE 1 MG: 1 INJECTION, SOLUTION INTRAMUSCULAR; INTRAVENOUS; SUBCUTANEOUS at 21:07

## 2025-06-19 RX ADMIN — WARFARIN SODIUM 5 MG: 5 TABLET ORAL at 17:03

## 2025-06-19 RX ADMIN — HYDROMORPHONE HYDROCHLORIDE 1 MG: 1 INJECTION, SOLUTION INTRAMUSCULAR; INTRAVENOUS; SUBCUTANEOUS at 02:49

## 2025-06-19 RX ADMIN — DIPHENHYDRAMINE HYDROCHLORIDE 25 MG: 25 CAPSULE ORAL at 10:30

## 2025-06-19 RX ADMIN — DIPHENHYDRAMINE HYDROCHLORIDE 25 MG: 25 CAPSULE ORAL at 17:07

## 2025-06-19 ASSESSMENT — COGNITIVE AND FUNCTIONAL STATUS - GENERAL
DAILY ACTIVITIY SCORE: 24
MOBILITY SCORE: 22
MOBILITY SCORE: 23
DAILY ACTIVITIY SCORE: 24
CLIMB 3 TO 5 STEPS WITH RAILING: A LITTLE
CLIMB 3 TO 5 STEPS WITH RAILING: A LITTLE
WALKING IN HOSPITAL ROOM: A LITTLE

## 2025-06-19 ASSESSMENT — PAIN SCALES - GENERAL
PAINLEVEL_OUTOF10: 8
PAINLEVEL_OUTOF10: 10 - WORST POSSIBLE PAIN
PAINLEVEL_OUTOF10: 8
PAINLEVEL_OUTOF10: 5 - MODERATE PAIN
PAINLEVEL_OUTOF10: 8
PAINLEVEL_OUTOF10: 9
PAINLEVEL_OUTOF10: 7
PAINLEVEL_OUTOF10: 8
PAINLEVEL_OUTOF10: 8

## 2025-06-19 ASSESSMENT — PAIN DESCRIPTION - LOCATION: LOCATION: GENERALIZED

## 2025-06-19 NOTE — CARE PLAN
The patient's goals for the shift include      The clinical goals for the shift include Pt will remain HDS and VSS throughout shift end on 6/19/25 @1900      Problem: Pain - Adult  Goal: Verbalizes/displays adequate comfort level or baseline comfort level  Outcome: Progressing     Problem: Safety - Adult  Goal: Free from fall injury  Outcome: Progressing     Problem: Discharge Planning  Goal: Discharge to home or other facility with appropriate resources  Outcome: Progressing     Problem: Chronic Conditions and Co-morbidities  Goal: Patient's chronic conditions and co-morbidity symptoms are monitored and maintained or improved  Outcome: Progressing     Problem: Nutrition  Goal: Nutrient intake appropriate for maintaining nutritional needs  Outcome: Progressing     Problem: Fall/Injury  Goal: Not fall by end of shift  Outcome: Progressing  Goal: Be free from injury by end of the shift  Outcome: Progressing  Goal: Verbalize understanding of personal risk factors for fall in the hospital  Outcome: Progressing  Goal: Verbalize understanding of risk factor reduction measures to prevent injury from fall in the home  Outcome: Progressing  Goal: Use assistive devices by end of the shift  Outcome: Progressing  Goal: Pace activities to prevent fatigue by end of the shift  Outcome: Progressing     Problem: Pain  Goal: Takes deep breaths with improved pain control throughout the shift  Outcome: Progressing  Goal: Turns in bed with improved pain control throughout the shift  Outcome: Progressing  Goal: Walks with improved pain control throughout the shift  Outcome: Progressing  Goal: Performs ADL's with improved pain control throughout shift  Outcome: Progressing  Goal: Participates in PT with improved pain control throughout the shift  Outcome: Progressing  Goal: Free from opioid side effects throughout the shift  Outcome: Progressing  Goal: Free from acute confusion related to pain meds throughout the shift  Outcome:  Progressing

## 2025-06-19 NOTE — POST-PROCEDURE NOTE
This is a 55 y.o. female with Hemoglobin SC Disease with pain crisis (on the chronic exchange program, last exchange on 5/8/2025 in MICU) who underwent automated red blood cell exchange (RCE) with 5 RBC units with target HgbS 15% (target HgbS and HgbC combined 30%) and target HCT 28%.     I saw and evaluated the patient during the RCE.  The patient was resting in bed.  Vascular access functioned well.    WBC   Date/Time Value Ref Range Status   06/19/2025 02:55 AM 9.9 4.4 - 11.3 x10*3/uL Final     Hemoglobin   Date/Time Value Ref Range Status   06/19/2025 02:55 AM 8.2 (L) 12.0 - 16.0 g/dL Final     Hematocrit   Date/Time Value Ref Range Status   06/19/2025 02:55 AM 26.5 (L) 36.0 - 46.0 % Final     Platelets   Date/Time Value Ref Range Status   06/19/2025 02:55  150 - 450 x10*3/uL Final        POCT Calcium, Ionized   Date/Time Value Ref Range Status   06/19/2025 04:56 AM 1.20 1.1 - 1.33 mmol/L Final     Comment:     The performance characteristics of ionized calcium tested  in heparinized plasma or serum have been validated by the  individual  laboratory site where testing is performed.   Testing on heparinized plasma or serum is not approved by   the FDA; however, such approval is not necessary.        Hemoglobin S   Date/Time Value Ref Range Status   06/17/2025 11:01 AM 28.6 (H) <=0.0 % Final      Hemoglobin C  Date/Time  06/17/2025 11:01 AM    Value: 27.1% (H)  Ref Range: <=0.0 %    Ferritin   Date/Time Value Ref Range Status   06/10/2025 02:49 PM 23 8 - 150 ng/mL Final        Pre-procedure vital signs at 10:55:  T: 36.2 C, HR: 62, RR: 20, BP: 90/68  Pulse oximetry: 99% on 2L O2 via nasal cannula    Post-procedure vital signs at 12:28:  T: 36.3 C, HR: 71, RR: 20, BP: 98/57  Pulse oximetry: 95% on 2L O2 via nasal cannula    Outcome: RCE completed.  Adverse Reaction: No    Assessment and Plan:  55 y.o. 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    Draw post-procedure labs  Vascular  access to be managed by the clinical team.  No further RCE planned this admission.  Tentative next outpatient RCE in 4-6 weeks, to be scheduled by the apheresis center in coordination with primary outpatient hematology team.

## 2025-06-19 NOTE — POST-PROCEDURE NOTE
Senait Narvaez [id number]       Procedure type: Red cell Exchange    Replacement Fluids: 5 units of PRBC used for procedure (RBCX)     Procedure Completed Without complications.    Attending notified of completion status. See flow sheet(s) for additional details.  Post-Vitals listed below.    T 36.3  P  71  R  20  BP  98/57  Pulse ox 95% on 2L O2             Freddy Tovar RN

## 2025-06-19 NOTE — PROGRESS NOTES
"Senait Narvaez is a 55 y.o. female on day 2 of admission presenting with Sickle cell pain crisis (Multi).    Subjective   Pt seen at bedside this AM. Reports generalized pain continues but improved since change yesterday. Plan for RBCexchange today. Endorses some nausea relieved by zofran, otherwise no acute concerns at this time. Reports appetite has been lower than usually of late, plans on ordering breakfast soon. Reports generalized pruritus from opioid use, encouraged po benadryl use at this time.        Objective     Physical Exam  Constitutional:       Appearance: She is obese.   HENT:      Head: Normocephalic.      Right Ear: External ear normal.      Left Ear: External ear normal.      Nose: Nose normal.   Eyes:      Extraocular Movements: Extraocular movements intact.   Cardiovascular:      Rate and Rhythm: Normal rate and regular rhythm.   Pulmonary:      Effort: Pulmonary effort is normal.      Breath sounds: Normal breath sounds.   Abdominal:      General: Bowel sounds are normal.      Palpations: Abdomen is soft.   Musculoskeletal:      Cervical back: Normal range of motion.   Skin:     General: Skin is warm and dry.      Comments: L breast mass   Neurological:      Mental Status: She is alert.   Psychiatric:         Mood and Affect: Mood normal.         Behavior: Behavior normal.         Thought Content: Thought content normal.         Last Recorded Vitals  Blood pressure 96/58, pulse 63, temperature 36.4 °C (97.5 °F), resp. rate 20, height 1.651 m (5' 5\"), weight 96.5 kg (212 lb 11.9 oz), SpO2 99%.  Intake/Output last 3 Shifts:  I/O last 3 completed shifts:  In: 1055 (10.9 mL/kg) [I.V.:1000 (10.4 mL/kg); IV Piggyback:55]  Out: - (0 mL/kg)   Weight: 96.5 kg     Relevant Results               This patient has a central line   Reason for the central line remaining today? RBC exchange    .Scheduled medications  Scheduled Medications[1]  Continuous medications  Continuous Medications[2]  PRN medications  PRN " Medications[3]    .  Results for orders placed or performed during the hospital encounter of 06/17/25 (from the past 24 hours)   Prepare RBC: 5 Units   Result Value Ref Range    PRODUCT CODE L5435R56     Unit Number [id number]     Unit ABO B     Unit RH POS     XM INTEP COMP     Dispense Status TR     Blood Expiration Date 7/16/2025 11:59:00 PM EDT     PRODUCT BLOOD TYPE 7300     UNIT VOLUME 350     PRODUCT CODE W1357L05     Unit Number [id number]     Unit ABO B     Unit RH POS     XM INTEP COMP     Dispense Status IS     Blood Expiration Date 7/16/2025 11:59:00 PM EDT     PRODUCT BLOOD TYPE 7300     UNIT VOLUME 350     PRODUCT CODE U4209N65     Unit Number [id number]     Unit ABO B     Unit RH POS     XM INTEP COMP     Dispense Status TR     Blood Expiration Date 7/16/2025 11:59:00 PM EDT     PRODUCT BLOOD TYPE 7300     UNIT VOLUME 350     PRODUCT CODE U0663D91     Unit Number [id number]     Unit ABO B     Unit RH NEG     XM INTEP COMP     Dispense Status TR     Blood Expiration Date 7/15/2025 11:59:00 PM EDT     PRODUCT BLOOD TYPE 1700     UNIT VOLUME 350     PRODUCT CODE N1002I85     Unit Number [id number]     Unit ABO B     Unit RH NEG     XM INTEP COMP     Dispense Status TR     Blood Expiration Date 7/15/2025 11:59:00 PM EDT     PRODUCT BLOOD TYPE 1700     UNIT VOLUME 350    Calcium, Ionized   Result Value Ref Range    POCT Calcium, Ionized 1.18 1.1 - 1.33 mmol/L   Coagulation Screen   Result Value Ref Range    Protime 15.2 (H) 9.8 - 12.4 seconds    INR 1.4 (H) 0.9 - 1.1    aPTT 31 26 - 36 seconds   Lactate   Result Value Ref Range    Lactate 0.6 0.4 - 2.0 mmol/L   CBC and Auto Differential   Result Value Ref Range    WBC 9.9 4.4 - 11.3 x10*3/uL    nRBC 5.7 (H) 0.0 - 0.0 /100 WBCs    RBC 3.59 (L) 4.00 - 5.20 x10*6/uL    Hemoglobin 8.2 (L) 12.0 - 16.0 g/dL    Hematocrit 26.5 (L) 36.0 - 46.0 %    MCV 74 (L) 80 - 100 fL    MCH 22.8 (L) 26.0 - 34.0 pg    MCHC 30.9 (L) 32.0 - 36.0 g/dL     RDW 31.3 (H) 11.5 - 14.5 %    Platelets 283 150 - 450 x10*3/uL    Neutrophils % 73.6 40.0 - 80.0 %    Immature Granulocytes %, Automated 0.5 0.0 - 0.9 %    Lymphocytes % 15.1 13.0 - 44.0 %    Monocytes % 6.4 2.0 - 10.0 %    Eosinophils % 4.2 0.0 - 6.0 %    Basophils % 0.2 0.0 - 2.0 %    Neutrophils Absolute 7.27 1.20 - 7.70 x10*3/uL    Immature Granulocytes Absolute, Automated 0.05 0.00 - 0.70 x10*3/uL    Lymphocytes Absolute 1.49 1.20 - 4.80 x10*3/uL    Monocytes Absolute 0.63 0.10 - 1.00 x10*3/uL    Eosinophils Absolute 0.41 0.00 - 0.70 x10*3/uL    Basophils Absolute 0.02 0.00 - 0.10 x10*3/uL   Comprehensive metabolic panel   Result Value Ref Range    Glucose 85 74 - 99 mg/dL    Sodium 134 (L) 136 - 145 mmol/L    Potassium 4.5 3.5 - 5.3 mmol/L    Chloride 98 98 - 107 mmol/L    Bicarbonate 31 21 - 32 mmol/L    Anion Gap 10 10 - 20 mmol/L    Urea Nitrogen 21 6 - 23 mg/dL    Creatinine 1.39 (H) 0.50 - 1.05 mg/dL    eGFR 45 (L) >60 mL/min/1.73m*2    Calcium 8.9 8.6 - 10.6 mg/dL    Albumin 3.4 3.4 - 5.0 g/dL    Alkaline Phosphatase 127 (H) 33 - 110 U/L    Total Protein 7.1 6.4 - 8.2 g/dL    AST 13 9 - 39 U/L    Bilirubin, Total 1.4 (H) 0.0 - 1.2 mg/dL    ALT 8 7 - 45 U/L   Lactate dehydrogenase   Result Value Ref Range     84 - 246 U/L   Reticulocytes   Result Value Ref Range    Retic % 0.1 (L) 0.5 - 2.0 %    Retic Absolute 0.004 (L) 0.018 - 0.083 x10*6/uL    Reticulocyte Hemoglobin 22 (L) 28 - 38 pg    Immature Retic fraction 63.8 (H) <=16.0 %   Calcium, Ionized   Result Value Ref Range    POCT Calcium, Ionized 1.20 1.1 - 1.33 mmol/L     *Note: Due to a large number of results and/or encounters for the requested time period, some results have not been displayed. A complete set of results can be found in Results Review.           Assessment & Plan  Sickle cell pain crisis (Multi)    Senait Narvaez is a 55 y.o. 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 Orthos negative. CXR (6/17) neg for acute process. Hgb and lysis labs near baseline on admit. Started on IV Dilaudid per care path. Transfusion med consulted for routine inpatient exchange transfusion. s/p femoral apheresis line placement 6/18, s/p RBCex 6/19. R shoulder xray (6/17) negative. DC home pending exchange transfusion and pain improvement.      Updates 6/19:   - s/p RBC exchange today, post exchange labs pending   - cont current pain regimen  - soft Bps 90s/50s, home metop dose 25mg decreased to 12.5mg BID w/ close monitoring and held home lasix per attending      # Dizziness, improved  # Syncope, improved  - likely 2/2 cardiogenic causes vs dehydration   - pt reports dizziness (started ~3 weeks ago) with one episode of syncope 3 weeks ago  - EKG on admit (6/17) NSR with R axis deviation with R ventricular hypertrophy  - Last TTE 5/9/25: HFpEF with EF of 67%, severely enlarged RV with decreased function, elevated RVSP  - troponin on admit (6/17) 4, repeat troponin pending (6/18)  - orthostatic vitals ordered (6/18) pending  - BNP (6/18) 118  - lactate (6/18) 0.6  - can consider RHC if symptoms continue  - telemetry ordered     # Hb SC disease   # Acute on chronic SCD related pain crisis  # routine inpatient exchange  - currently on q6 week RBC exchange transfusions (most recent outpatient 4/18 and inpatient 5/8)  - [facility] reviewed, last updated 4/16/25: IV Dilaudid 1-1.5 mg every 2-3 hours prn for severe pain   - OARRS reviewed, no aberrant behavior noted   - CXR (6/17) neg for acute process  - EKG (6/17)  NSR with R axis deviation with R ventricular hypertrophy  - Ortho BP pending (6/18)  - Covid/Flu negative (6/17)  - Hgb BL ~ 8, Hgb 9.4 on admission (6/17) - no indication for simple transfusion  - tbili BL ~ 1.0, 1.7 on admission (6/17)--> 1.6 (6/18)  - LDH ~150-200,  on admission (6/17)--> 179 (6/18)  - s/p IV Dilaudid 1mg q3h PRN for severe pain (6/17-6/18)  - start IV Dilaudid 1mg Q2 PRN for severe pain (6/18-current)  - Cyclobenzaprine 10 mg TID PRN   - Bowel regimen for opioid induced constipation with Senna 2tabs nightly and Miralax daily, PO Benadryl 25 mg q6h PRN for opioid-induced pruritus, PO Zofran 8 mg q8h PRN for opioid-induced nausea  - c/w home folic acid 1 mg daily  - Utox pending (6/17)  - Hgb S pending (6/17)  - Lactate negative 0.6 6/18  - Exchange labs ordered 6/17  - Transfusion med consulted per Dr. Whaley (6/17), plan routine inpatient exchange 6/19  - s/p femoral apheresis line placement 6/18  - ionized CA 1.09 (6/17), IV calcium chloride 0.5mg ordered, repeat RNP ordered for evening draw  - Parvo ordered (6/18) results pending  - R shoulder xray (6/17) negative  - ESR 1.18/CRP 14 (6/17)  - IS encouraged     # HFpEF  # hx pHTN  # hx of troponinemia   - Last TTE 5/9/25: HFpEF with EF of 67% right ventricular overloaded, severely enlarged RV with decreased function, elevated RVSP  - hx of elevated troponins on admissions, likely leak i/s/o demand  - on lasix 20 every other day at home, held for 6/20 per Dr. Hensley   - troponin (6/17) negative, repeat troponin (6/18) 4     # hx of DVT/PE  # hx of SVC syndrome  - home warfarin held on admission for apheresis line placement, RESTARTED post fem line placement for exchange (6/18)  - SCDs  - Daily Coags to monitor INR     # hx of SVT  - continue home metoprolol tartrate 25 mg BID --> 6/19 dose adjusted   - c/w tele     # Dispo  - Full code, confirmed on admit  - DC home resumed noc O2 pending routine exchange and pain control  - FUV 6/23  Dr. Jovana MULLINS spent > 90 minutes in the professional and overall care of this patient.     Assessment and plan as above discussed with attending physician Dr. Hensley.      Marisa Brizuela, APRN-CNP         [1] ascorbic acid, 500 mg, oral, Daily  atropine, 1 drop, Left Eye, Nightly  cetirizine, 10 mg, oral, Daily  folic acid, 1 mg, oral, Daily  [Held by provider] furosemide, 20 mg, oral, Every other day  heparin, 1,000 Units, intravenous, Once  heparin, 1,000 Units, intravenous, Once  metoprolol tartrate, 12.5 mg, oral, BID  polyethylene glycol, 17 g, oral, Daily  sennosides-docusate sodium, 2 tablet, oral, Nightly  warfarin, 5 mg, oral, Daily  white petrolatum, 2 Application, Topical, BID  [2] calcium gluconate 2 g in 100 mL infusion, 25 mL/hr, Last Rate: 25 mL/hr (06/19/25 1115)  oxygen, , Last Rate: 2 L/min (06/17/25 2335)  [3] PRN medications: albuterol, benzocaine-menthol, calcium carbonate, cyclobenzaprine, diphenhydrAMINE, diphenhydrAMINE, fluticasone, HYDROmorphone, ondansetron ODT, phenoL, sodium chloride

## 2025-06-20 LAB
ALBUMIN SERPL BCP-MCNC: 3.2 G/DL (ref 3.4–5)
ALP SERPL-CCNC: 109 U/L (ref 33–110)
ALT SERPL W P-5'-P-CCNC: 7 U/L (ref 7–45)
ANION GAP SERPL CALC-SCNC: 10 MMOL/L (ref 10–20)
APTT PPP: 33 SECONDS (ref 26–36)
AST SERPL W P-5'-P-CCNC: 12 U/L (ref 9–39)
BASOPHILS # BLD AUTO: 0.01 X10*3/UL (ref 0–0.1)
BASOPHILS NFR BLD AUTO: 0.1 %
BILIRUB SERPL-MCNC: 1.6 MG/DL (ref 0–1.2)
BUN SERPL-MCNC: 29 MG/DL (ref 6–23)
CALCIUM SERPL-MCNC: 8.6 MG/DL (ref 8.6–10.6)
CHLORIDE SERPL-SCNC: 101 MMOL/L (ref 98–107)
CO2 SERPL-SCNC: 31 MMOL/L (ref 21–32)
CREAT SERPL-MCNC: 1.26 MG/DL (ref 0.5–1.05)
EGFRCR SERPLBLD CKD-EPI 2021: 50 ML/MIN/1.73M*2
EOSINOPHIL # BLD AUTO: 0.34 X10*3/UL (ref 0–0.7)
EOSINOPHIL NFR BLD AUTO: 3.6 %
ERYTHROCYTE [DISTWIDTH] IN BLOOD BY AUTOMATED COUNT: 25.6 % (ref 11.5–14.5)
GLUCOSE SERPL-MCNC: 84 MG/DL (ref 74–99)
HCT VFR BLD AUTO: 27.5 % (ref 36–46)
HEMOGLOBIN A2: 3.1 % (ref 2–3.5)
HEMOGLOBIN A: 71.6 % (ref 95.8–98)
HEMOGLOBIN C: 12.2 %
HEMOGLOBIN F: 0.6 % (ref 0–2)
HEMOGLOBIN IDENTIFICATION INTERPRETATION: ABNORMAL
HEMOGLOBIN S: 12.5 %
HGB BLD-MCNC: 8.8 G/DL (ref 12–16)
HGB RETIC QN: 21 PG (ref 28–38)
IMM GRANULOCYTES # BLD AUTO: 0.03 X10*3/UL (ref 0–0.7)
IMM GRANULOCYTES NFR BLD AUTO: 0.3 % (ref 0–0.9)
IMMATURE RETIC FRACTION: 13 %
INR PPP: 1.5 (ref 0.9–1.1)
LDH SERPL L TO P-CCNC: 152 U/L (ref 84–246)
LYMPHOCYTES # BLD AUTO: 1.11 X10*3/UL (ref 1.2–4.8)
LYMPHOCYTES NFR BLD AUTO: 11.9 %
MCH RBC QN AUTO: 24.9 PG (ref 26–34)
MCHC RBC AUTO-ENTMCNC: 32 G/DL (ref 32–36)
MCV RBC AUTO: 78 FL (ref 80–100)
MONOCYTES # BLD AUTO: 0.82 X10*3/UL (ref 0.1–1)
MONOCYTES NFR BLD AUTO: 8.8 %
NEUTROPHILS # BLD AUTO: 7.03 X10*3/UL (ref 1.2–7.7)
NEUTROPHILS NFR BLD AUTO: 75.3 %
NRBC BLD-RTO: 6.5 /100 WBCS (ref 0–0)
PATH REVIEW-HGB IDENTIFICATION: ABNORMAL
PLATELET # BLD AUTO: 184 X10*3/UL (ref 150–450)
POTASSIUM SERPL-SCNC: 4.8 MMOL/L (ref 3.5–5.3)
PROT SERPL-MCNC: 6.4 G/DL (ref 6.4–8.2)
PROTHROMBIN TIME: 16.6 SECONDS (ref 9.8–12.4)
RBC # BLD AUTO: 3.54 X10*6/UL (ref 4–5.2)
RETICS #: 0.11 X10*6/UL (ref 0.02–0.08)
RETICS/RBC NFR AUTO: 3.1 % (ref 0.5–2)
SODIUM SERPL-SCNC: 137 MMOL/L (ref 136–145)
WBC # BLD AUTO: 9.3 X10*3/UL (ref 4.4–11.3)

## 2025-06-20 PROCEDURE — 36415 COLL VENOUS BLD VENIPUNCTURE: CPT

## 2025-06-20 PROCEDURE — 85025 COMPLETE CBC W/AUTO DIFF WBC: CPT

## 2025-06-20 PROCEDURE — 2500000004 HC RX 250 GENERAL PHARMACY W/ HCPCS (ALT 636 FOR OP/ED): Mod: JZ,TB

## 2025-06-20 PROCEDURE — 85610 PROTHROMBIN TIME: CPT

## 2025-06-20 PROCEDURE — 99233 SBSQ HOSP IP/OBS HIGH 50: CPT

## 2025-06-20 PROCEDURE — 2500000001 HC RX 250 WO HCPCS SELF ADMINISTERED DRUGS (ALT 637 FOR MEDICARE OP)

## 2025-06-20 PROCEDURE — 2500000002 HC RX 250 W HCPCS SELF ADMINISTERED DRUGS (ALT 637 FOR MEDICARE OP, ALT 636 FOR OP/ED)

## 2025-06-20 PROCEDURE — 85045 AUTOMATED RETICULOCYTE COUNT: CPT

## 2025-06-20 PROCEDURE — 1200000003 HC ONCOLOGY  ROOM WITH TELEMETRY DAILY

## 2025-06-20 PROCEDURE — 2500000005 HC RX 250 GENERAL PHARMACY W/O HCPCS

## 2025-06-20 PROCEDURE — 2500000001 HC RX 250 WO HCPCS SELF ADMINISTERED DRUGS (ALT 637 FOR MEDICARE OP): Performed by: PHYSICIAN ASSISTANT

## 2025-06-20 PROCEDURE — 83615 LACTATE (LD) (LDH) ENZYME: CPT

## 2025-06-20 PROCEDURE — 80053 COMPREHEN METABOLIC PANEL: CPT

## 2025-06-20 RX ORDER — METOPROLOL TARTRATE 25 MG/1
25 TABLET, FILM COATED ORAL 2 TIMES DAILY
Status: DISCONTINUED | OUTPATIENT
Start: 2025-06-20 | End: 2025-06-23 | Stop reason: HOSPADM

## 2025-06-20 RX ADMIN — HYDROMORPHONE HYDROCHLORIDE 1 MG: 1 INJECTION, SOLUTION INTRAMUSCULAR; INTRAVENOUS; SUBCUTANEOUS at 21:08

## 2025-06-20 RX ADMIN — HYDROMORPHONE HYDROCHLORIDE 1 MG: 1 INJECTION, SOLUTION INTRAMUSCULAR; INTRAVENOUS; SUBCUTANEOUS at 00:06

## 2025-06-20 RX ADMIN — ATROPINE SULFATE 1 DROP: 10 SOLUTION/ DROPS OPHTHALMIC at 21:08

## 2025-06-20 RX ADMIN — HYDROMORPHONE HYDROCHLORIDE 1 MG: 1 INJECTION, SOLUTION INTRAMUSCULAR; INTRAVENOUS; SUBCUTANEOUS at 23:30

## 2025-06-20 RX ADMIN — HYDROMORPHONE HYDROCHLORIDE 1 MG: 1 INJECTION, SOLUTION INTRAMUSCULAR; INTRAVENOUS; SUBCUTANEOUS at 10:33

## 2025-06-20 RX ADMIN — METOPROLOL TARTRATE 12.5 MG: 25 TABLET, FILM COATED ORAL at 08:30

## 2025-06-20 RX ADMIN — HYDROMORPHONE HYDROCHLORIDE 1 MG: 1 INJECTION, SOLUTION INTRAMUSCULAR; INTRAVENOUS; SUBCUTANEOUS at 03:03

## 2025-06-20 RX ADMIN — HYDROMORPHONE HYDROCHLORIDE 1 MG: 1 INJECTION, SOLUTION INTRAMUSCULAR; INTRAVENOUS; SUBCUTANEOUS at 16:18

## 2025-06-20 RX ADMIN — HYDROMORPHONE HYDROCHLORIDE 1 MG: 1 INJECTION, SOLUTION INTRAMUSCULAR; INTRAVENOUS; SUBCUTANEOUS at 18:20

## 2025-06-20 RX ADMIN — OXYCODONE HYDROCHLORIDE AND ACETAMINOPHEN 500 MG: 500 TABLET ORAL at 08:30

## 2025-06-20 RX ADMIN — HYDROMORPHONE HYDROCHLORIDE 1 MG: 1 INJECTION, SOLUTION INTRAMUSCULAR; INTRAVENOUS; SUBCUTANEOUS at 05:08

## 2025-06-20 RX ADMIN — HYDROMORPHONE HYDROCHLORIDE 1 MG: 1 INJECTION, SOLUTION INTRAMUSCULAR; INTRAVENOUS; SUBCUTANEOUS at 13:20

## 2025-06-20 RX ADMIN — HYDROMORPHONE HYDROCHLORIDE 1 MG: 1 INJECTION, SOLUTION INTRAMUSCULAR; INTRAVENOUS; SUBCUTANEOUS at 08:29

## 2025-06-20 RX ADMIN — DIPHENHYDRAMINE HYDROCHLORIDE 25 MG: 25 CAPSULE ORAL at 00:06

## 2025-06-20 RX ADMIN — CETIRIZINE HYDROCHLORIDE 10 MG: 10 TABLET, FILM COATED ORAL at 08:30

## 2025-06-20 RX ADMIN — FOLIC ACID 1 MG: 1 TABLET ORAL at 08:30

## 2025-06-20 RX ADMIN — WARFARIN SODIUM 5 MG: 5 TABLET ORAL at 17:48

## 2025-06-20 ASSESSMENT — COGNITIVE AND FUNCTIONAL STATUS - GENERAL
MOBILITY SCORE: 23
DAILY ACTIVITIY SCORE: 24
CLIMB 3 TO 5 STEPS WITH RAILING: A LITTLE

## 2025-06-20 ASSESSMENT — PAIN SCALES - GENERAL
PAINLEVEL_OUTOF10: 9
PAINLEVEL_OUTOF10: 8
PAINLEVEL_OUTOF10: 8
PAINLEVEL_OUTOF10: 5 - MODERATE PAIN
PAINLEVEL_OUTOF10: 9
PAINLEVEL_OUTOF10: 8
PAINLEVEL_OUTOF10: 8
PAINLEVEL_OUTOF10: 9
PAINLEVEL_OUTOF10: 9
PAINLEVEL_OUTOF10: 5 - MODERATE PAIN
PAINLEVEL_OUTOF10: 6
PAINLEVEL_OUTOF10: 5 - MODERATE PAIN
PAINLEVEL_OUTOF10: 9
PAINLEVEL_OUTOF10: 8
PAINLEVEL_OUTOF10: 9

## 2025-06-20 ASSESSMENT — PAIN DESCRIPTION - LOCATION: LOCATION: GENERALIZED

## 2025-06-20 NOTE — PROGRESS NOTES
"Senait Narvaez is a 55 y.o. female on day 3 of admission presenting with Sickle cell pain crisis (Multi).    Subjective   Pt seen at bedside this AM. Reports pain generalized but getting better. We will continue current regimen and plan to rotate tomorrow for homegoing and discharge Sund, pt agreeable to plan.     Denies vomiting, fever, chills, chest pain, SOB, constipation, diarrhea, bleeding, edema, falls.        Objective     Physical Exam  Constitutional:       Appearance: She is obese.   HENT:      Head: Normocephalic.      Right Ear: External ear normal.      Left Ear: External ear normal.      Nose: Nose normal.   Eyes:      Extraocular Movements: Extraocular movements intact.      Conjunctiva/sclera: Conjunctivae normal.   Cardiovascular:      Rate and Rhythm: Normal rate and regular rhythm.   Pulmonary:      Effort: Pulmonary effort is normal.      Breath sounds: Normal breath sounds.   Abdominal:      General: Bowel sounds are normal.      Palpations: Abdomen is soft.   Musculoskeletal:         General: Normal range of motion.      Cervical back: Normal range of motion.   Skin:     General: Skin is warm and dry.   Neurological:      Mental Status: She is alert and oriented to person, place, and time. Mental status is at baseline.   Psychiatric:         Mood and Affect: Mood normal.         Behavior: Behavior normal.         Thought Content: Thought content normal.         Last Recorded Vitals  Blood pressure 140/80, pulse 85, temperature 36.2 °C (97.1 °F), temperature source Temporal, resp. rate 17, height 1.651 m (5' 5\"), weight 96.5 kg (212 lb 11.9 oz), SpO2 94%.  Intake/Output last 3 Shifts:  I/O last 3 completed shifts:  In: 1751 (18.1 mL/kg) [Other:1701; IV Piggyback:50]  Out: 1691 (17.5 mL/kg) [Other:1691]  Weight: 96.5 kg     Relevant Results     .Scheduled medications  Scheduled Medications[1]  Continuous medications  Continuous Medications[2]  PRN medications  PRN Medications[3]    .  Results for " orders placed or performed during the hospital encounter of 06/17/25 (from the past 24 hours)   CBC   Result Value Ref Range    WBC 6.7 4.4 - 11.3 x10*3/uL    nRBC 0.0 0.0 - 0.0 /100 WBCs    RBC 3.80 (L) 4.00 - 5.20 x10*6/uL    Hemoglobin 9.4 (L) 12.0 - 16.0 g/dL    Hematocrit 29.4 (L) 36.0 - 46.0 %    MCV 77 (L) 80 - 100 fL    MCH 24.7 (L) 26.0 - 34.0 pg    MCHC 32.0 32.0 - 36.0 g/dL    RDW 25.6 (H) 11.5 - 14.5 %    Platelets 155 150 - 450 x10*3/uL   Calcium, Ionized   Result Value Ref Range    POCT Calcium, Ionized 1.06 (L) 1.1 - 1.33 mmol/L   Coagulation Screen   Result Value Ref Range    Protime 16.6 (H) 9.8 - 12.4 seconds    INR 1.5 (H) 0.9 - 1.1    aPTT 33 26 - 36 seconds   CBC and Auto Differential   Result Value Ref Range    WBC 9.3 4.4 - 11.3 x10*3/uL    nRBC 6.5 (H) 0.0 - 0.0 /100 WBCs    RBC 3.54 (L) 4.00 - 5.20 x10*6/uL    Hemoglobin 8.8 (L) 12.0 - 16.0 g/dL    Hematocrit 27.5 (L) 36.0 - 46.0 %    MCV 78 (L) 80 - 100 fL    MCH 24.9 (L) 26.0 - 34.0 pg    MCHC 32.0 32.0 - 36.0 g/dL    RDW 25.6 (H) 11.5 - 14.5 %    Platelets 184 150 - 450 x10*3/uL    Neutrophils % 75.3 40.0 - 80.0 %    Immature Granulocytes %, Automated 0.3 0.0 - 0.9 %    Lymphocytes % 11.9 13.0 - 44.0 %    Monocytes % 8.8 2.0 - 10.0 %    Eosinophils % 3.6 0.0 - 6.0 %    Basophils % 0.1 0.0 - 2.0 %    Neutrophils Absolute 7.03 1.20 - 7.70 x10*3/uL    Immature Granulocytes Absolute, Automated 0.03 0.00 - 0.70 x10*3/uL    Lymphocytes Absolute 1.11 (L) 1.20 - 4.80 x10*3/uL    Monocytes Absolute 0.82 0.10 - 1.00 x10*3/uL    Eosinophils Absolute 0.34 0.00 - 0.70 x10*3/uL    Basophils Absolute 0.01 0.00 - 0.10 x10*3/uL   Comprehensive metabolic panel   Result Value Ref Range    Glucose 84 74 - 99 mg/dL    Sodium 137 136 - 145 mmol/L    Potassium 4.8 3.5 - 5.3 mmol/L    Chloride 101 98 - 107 mmol/L    Bicarbonate 31 21 - 32 mmol/L    Anion Gap 10 10 - 20 mmol/L    Urea Nitrogen 29 (H) 6 - 23 mg/dL    Creatinine 1.26 (H) 0.50 - 1.05 mg/dL    eGFR  50 (L) >60 mL/min/1.73m*2    Calcium 8.6 8.6 - 10.6 mg/dL    Albumin 3.2 (L) 3.4 - 5.0 g/dL    Alkaline Phosphatase 109 33 - 110 U/L    Total Protein 6.4 6.4 - 8.2 g/dL    AST 12 9 - 39 U/L    Bilirubin, Total 1.6 (H) 0.0 - 1.2 mg/dL    ALT 7 7 - 45 U/L   Lactate dehydrogenase   Result Value Ref Range     84 - 246 U/L   Reticulocytes   Result Value Ref Range    Retic % 3.1 (H) 0.5 - 2.0 %    Retic Absolute 0.111 (H) 0.018 - 0.083 x10*6/uL    Reticulocyte Hemoglobin 21 (L) 28 - 38 pg    Immature Retic fraction 13.0 <=16.0 %     *Note: Due to a large number of results and/or encounters for the requested time period, some results have not been displayed. A complete set of results can be found in Results Review.               Malnutrition Diagnosis Status: New  Malnutrition Diagnosis: Moderate malnutrition related to chronic disease or condition  Related to: sickle cell disease  As Evidenced by: pt reports of variable PO x many months with 10% wt loss in ~6 mos  I agree with the dietitian's malnutrition diagnosis.      Assessment & Plan  Sickle cell pain crisis (Multi)    Senait Narvaez is a 55 y.o. 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 Orthos negative. CXR (6/17) neg for acute process. Hgb and lysis labs near baseline on admit. Started on IV Dilaudid per care path. Transfusion med consulted for routine inpatient exchange transfusion. s/p femoral apheresis line placement 6/18, s/p RBCex 6/19. R shoulder xray (6/17) negative. DC home resumed noc O2  pending pain improvement.      Updates 6/20:   - s/p RBC exchange today, post Hg S 12.5%  - cont current pain regimen, plan to rotate tomorrow with home oxycodone 10mg q4 prn  - 6/19 soft Bps 90s/50s, home metop dose 25mg decreased to 12.5mg BID w/ close monitoring and held home lasix per attending, per attending now increase back to home dose 25mg BID but continue to hold off on lasix for now     # Dizziness, resolved  # Syncope, resolved  - likely 2/2 cardiogenic causes vs dehydration   - pt reports dizziness (started ~3 weeks ago) with one episode of syncope 3 weeks ago  - EKG on admit (6/17) NSR with R axis deviation with R ventricular hypertrophy  - Last TTE 5/9/25: HFpEF with EF of 67%, severely enlarged RV with decreased function, elevated RVSP  - troponin on admit (6/17) 4, repeat troponin pending (6/18)  - orthostatic vitals ordered (6/18) negative  - BNP (6/18) 118  - lactate (6/18) 0.6  - can consider RHC if symptoms continue  - telemetry ordered     # Hb SC disease   # Acute on chronic SCD related pain   # routine inpatient exchange  - currently on q6 week RBC exchange transfusions (most recent outpatient 4/18 and inpatient 5/8)  - Carepath reviewed, last updated 4/16/25: IV Dilaudid 1-1.5 mg every 2-3 hours prn for severe pain   - OARRS reviewed, no aberrant behavior noted   - CXR (6/17) neg for acute process  - EKG (6/17) NSR with R axis deviation with R ventricular hypertrophy  - Ortho BP negative (6/18)  - Covid/Flu negative (6/17)  - Hgb BL ~ 8, Hgb 9.4 on admission (6/17) - no indication for simple transfusion  - tbili BL ~ 1.0, 1.7 on admission (6/17)--> 1.6 (6/18)  - LDH ~150-200,  on admission (6/17)--> 179 (6/18)  - s/p IV Dilaudid 1mg q3h PRN for severe pain (6/17-6/18)  - start IV Dilaudid 1mg Q2 PRN for severe pain (6/18-current), plan to rotate tomorrow 6/21 oxycodone 10mg/IVP dilaudid   - c/w Cyclobenzaprine 10 mg TID PRN   - Bowel regimen for opioid induced constipation with Senna  2tabs nightly and Miralax daily (LBM 6/19), PO Benadryl 25 mg q6h PRN for opioid-induced pruritus, PO Zofran 8 mg q8h PRN for opioid-induced nausea  - c/w home folic acid 1 mg daily  - Utox pending collection (6/17)  - Hgb S 28.6% (6/17)  - Lactate negative 0.6 6/18  - Transfusion med consulted per Dr. Whaley (6/17), plan routine inpatient exchange 6/19  - s/p femoral apheresis line placement 6/18, plan for removal prior to discharge  - ionized CA 1.09 (6/17), IV calcium chloride 0.5mg ordered,   - ionized Ca post exchange 1.06, s/p 0.5mg ca gluconate once   - Parvo ordered (6/18) results pending --- retic improved 0 --> 3% post exchange  - R shoulder xray (6/17) negative  - ESR 1.18/CRP 14 (6/17)  - IS encouraged     # HFpEF  # hx pHTN  # hx of troponinemia   - Last TTE 5/9/25: HFpEF with EF of 67% right ventricular overloaded, severely enlarged RV with decreased function, elevated RVSP  - hx of elevated troponins on admissions, likely leak i/s/o demand  - on lasix 20 every other day at home, held for 6/20 per Dr. Hensley   - troponin (6/17) negative, repeat troponin (6/18) 4     # hx of DVT/PE  # hx of SVC syndrome  - home warfarin held on admission for apheresis line placement, RESTARTED post fem line placement for exchange (6/18) after d/w pharmacy   - SCDs, encouraged ambulation   - Daily Coags to monitor INR, 6/20 subtherapeutic inr 1.5 (goal 2-3) -- per attending, will keep warfarin on for now (pt has been previously bridged with lovenox but was restarted back on warfarin d/t prior conversation with pharmacy on 6/18)     # hx of SVT  - continue home metoprolol tartrate 25 mg BID --> 6/19 dose adjusted 12.5mg BID --> 6/20 per Dr. Hensley increase back to home dose but keep lasix off for now   - c/w tele, noteable for PVCs    # CKD II  - BL~ 1.1-2  - avoid nephrotoxic meds and IVF     # Dispo  - Full code, confirmed on admit  - DC home resumed noc O2 pending routine exchange and pain control, likely 6/22  Lauren   JAY 6/23 Dr. Jovana MULLINS spent > 60 minutes in the professional and overall care of this patient.     Assessment and plan as above discussed with attending physician Dr. Hensley.      Marisa Brizuela, APRN-CNP         [1] ascorbic acid, 500 mg, oral, Daily  atropine, 1 drop, Left Eye, Nightly  cetirizine, 10 mg, oral, Daily  folic acid, 1 mg, oral, Daily  [Held by provider] furosemide, 20 mg, oral, Every other day  heparin flush, 50 Units, intra-catheter, q8h  metoprolol tartrate, 12.5 mg, oral, BID  polyethylene glycol, 17 g, oral, Daily  sennosides-docusate sodium, 2 tablet, oral, Nightly  warfarin, 5 mg, oral, Daily  white petrolatum, 2 Application, Topical, BID  [2] oxygen, , Last Rate: 2 L/min (06/17/25 2335)  [3] PRN medications: albuterol, alteplase, benzocaine-menthol, cyclobenzaprine, diphenhydrAMINE, fluticasone, heparin flush, HYDROmorphone, ondansetron ODT, phenoL

## 2025-06-20 NOTE — NURSING NOTE
Patient's telemetry batteries changed.  Telemetry monitor turned back on and functioning appropriately        Jaqueline Montero RN

## 2025-06-20 NOTE — CARE PLAN
Problem: Pain - Adult  Goal: Verbalizes/displays adequate comfort level or baseline comfort level  Outcome: Progressing     Problem: Safety - Adult  Goal: Free from fall injury  Outcome: Progressing     Problem: Discharge Planning  Goal: Discharge to home or other facility with appropriate resources  Outcome: Progressing     Problem: Chronic Conditions and Co-morbidities  Goal: Patient's chronic conditions and co-morbidity symptoms are monitored and maintained or improved  Outcome: Progressing     Problem: Nutrition  Goal: Nutrient intake appropriate for maintaining nutritional needs  Outcome: Progressing     Problem: Fall/Injury  Goal: Not fall by end of shift  Outcome: Progressing  Goal: Be free from injury by end of the shift  Outcome: Progressing  Goal: Verbalize understanding of personal risk factors for fall in the hospital  Outcome: Progressing  Goal: Verbalize understanding of risk factor reduction measures to prevent injury from fall in the home  Outcome: Progressing  Goal: Use assistive devices by end of the shift  Outcome: Progressing  Goal: Pace activities to prevent fatigue by end of the shift  Outcome: Progressing     Problem: Pain  Goal: Takes deep breaths with improved pain control throughout the shift  Outcome: Progressing  Goal: Turns in bed with improved pain control throughout the shift  Outcome: Progressing  Goal: Walks with improved pain control throughout the shift  Outcome: Progressing  Goal: Performs ADL's with improved pain control throughout shift  Outcome: Progressing  Goal: Participates in PT with improved pain control throughout the shift  Outcome: Progressing  Goal: Free from opioid side effects throughout the shift  Outcome: Progressing  Goal: Free from acute confusion related to pain meds throughout the shift  Outcome: Progressing   The patient's goals for the shift include      The clinical goals for the shift include pt will remain HDS and VSS throughout shift    Pt remained safe  and free from injury throughout shift. Patient given pain meds throughout shift to keep pain at a manageable level. Pt still having pain and plan to continue with pain regimen.

## 2025-06-21 LAB
ALBUMIN SERPL BCP-MCNC: 1.6 G/DL (ref 3.4–5)
ALBUMIN SERPL BCP-MCNC: 3.4 G/DL (ref 3.4–5)
ALP SERPL-CCNC: 118 U/L (ref 33–110)
ALP SERPL-CCNC: 56 U/L (ref 33–110)
ALT SERPL W P-5'-P-CCNC: 4 U/L (ref 7–45)
ALT SERPL W P-5'-P-CCNC: 9 U/L (ref 7–45)
ANION GAP SERPL CALC-SCNC: 10 MMOL/L (ref 10–20)
ANION GAP SERPL CALC-SCNC: 35 MMOL/L (ref 10–20)
APTT PPP: 25 SECONDS (ref 26–36)
AST SERPL W P-5'-P-CCNC: 15 U/L (ref 9–39)
AST SERPL W P-5'-P-CCNC: 16 U/L (ref 9–39)
BASOPHILS # BLD AUTO: 0.02 X10*3/UL (ref 0–0.1)
BASOPHILS NFR BLD AUTO: 0.2 %
BILIRUB SERPL-MCNC: 0.7 MG/DL (ref 0–1.2)
BILIRUB SERPL-MCNC: 1.3 MG/DL (ref 0–1.2)
BUN SERPL-MCNC: 14 MG/DL (ref 6–23)
BUN SERPL-MCNC: 21 MG/DL (ref 6–23)
CALCIUM SERPL-MCNC: 4 MG/DL (ref 8.6–10.6)
CALCIUM SERPL-MCNC: 8.8 MG/DL (ref 8.6–10.6)
CHLORIDE SERPL-SCNC: 102 MMOL/L (ref 98–107)
CHLORIDE SERPL-SCNC: 107 MMOL/L (ref 98–107)
CO2 SERPL-SCNC: 14 MMOL/L (ref 21–32)
CO2 SERPL-SCNC: 31 MMOL/L (ref 21–32)
CREAT SERPL-MCNC: 0.5 MG/DL (ref 0.5–1.05)
CREAT SERPL-MCNC: 0.94 MG/DL (ref 0.5–1.05)
EGFRCR SERPLBLD CKD-EPI 2021: 71 ML/MIN/1.73M*2
EGFRCR SERPLBLD CKD-EPI 2021: >90 ML/MIN/1.73M*2
EOSINOPHIL # BLD AUTO: 0.38 X10*3/UL (ref 0–0.7)
EOSINOPHIL NFR BLD AUTO: 4 %
ERYTHROCYTE [DISTWIDTH] IN BLOOD BY AUTOMATED COUNT: 27.7 % (ref 11.5–14.5)
GLUCOSE SERPL-MCNC: 58 MG/DL (ref 74–99)
GLUCOSE SERPL-MCNC: 81 MG/DL (ref 74–99)
HCT VFR BLD AUTO: 25.9 % (ref 36–46)
HGB BLD-MCNC: 8.2 G/DL (ref 12–16)
HGB RETIC QN: 20 PG (ref 28–38)
IMM GRANULOCYTES # BLD AUTO: 0.03 X10*3/UL (ref 0–0.7)
IMM GRANULOCYTES NFR BLD AUTO: 0.3 % (ref 0–0.9)
IMMATURE RETIC FRACTION: 26 %
INR PPP: 2.3 (ref 0.9–1.1)
LDH SERPL L TO P-CCNC: 236 U/L (ref 84–246)
LYMPHOCYTES # BLD AUTO: 2.31 X10*3/UL (ref 1.2–4.8)
LYMPHOCYTES NFR BLD AUTO: 24.2 %
MCH RBC QN AUTO: 24.6 PG (ref 26–34)
MCHC RBC AUTO-ENTMCNC: 31.7 G/DL (ref 32–36)
MCV RBC AUTO: 78 FL (ref 80–100)
MONOCYTES # BLD AUTO: 0.83 X10*3/UL (ref 0.1–1)
MONOCYTES NFR BLD AUTO: 8.7 %
NEUTROPHILS # BLD AUTO: 5.98 X10*3/UL (ref 1.2–7.7)
NEUTROPHILS NFR BLD AUTO: 62.6 %
NRBC BLD-RTO: 0.3 /100 WBCS (ref 0–0)
PLATELET # BLD AUTO: 220 X10*3/UL (ref 150–450)
POTASSIUM SERPL-SCNC: 3 MMOL/L (ref 3.5–5.3)
POTASSIUM SERPL-SCNC: 4.5 MMOL/L (ref 3.5–5.3)
PROT SERPL-MCNC: 3.2 G/DL (ref 6.4–8.2)
PROT SERPL-MCNC: 6.7 G/DL (ref 6.4–8.2)
PROTHROMBIN TIME: 25.4 SECONDS (ref 9.8–12.4)
RBC # BLD AUTO: 3.33 X10*6/UL (ref 4–5.2)
RETICS #: 0.14 X10*6/UL (ref 0.02–0.08)
RETICS/RBC NFR AUTO: 4.2 % (ref 0.5–2)
SODIUM SERPL-SCNC: 138 MMOL/L (ref 136–145)
SODIUM SERPL-SCNC: 153 MMOL/L (ref 136–145)
STAPHYLOCOCCUS SPEC CULT: ABNORMAL
WBC # BLD AUTO: 9.6 X10*3/UL (ref 4.4–11.3)

## 2025-06-21 PROCEDURE — 99233 SBSQ HOSP IP/OBS HIGH 50: CPT | Performed by: NURSE PRACTITIONER

## 2025-06-21 PROCEDURE — 1170000001 HC PRIVATE ONCOLOGY ROOM DAILY

## 2025-06-21 PROCEDURE — 2500000004 HC RX 250 GENERAL PHARMACY W/ HCPCS (ALT 636 FOR OP/ED): Mod: JZ,TB

## 2025-06-21 PROCEDURE — 36415 COLL VENOUS BLD VENIPUNCTURE: CPT

## 2025-06-21 PROCEDURE — 80053 COMPREHEN METABOLIC PANEL: CPT

## 2025-06-21 PROCEDURE — 2500000001 HC RX 250 WO HCPCS SELF ADMINISTERED DRUGS (ALT 637 FOR MEDICARE OP)

## 2025-06-21 PROCEDURE — 83615 LACTATE (LD) (LDH) ENZYME: CPT

## 2025-06-21 PROCEDURE — 2500000001 HC RX 250 WO HCPCS SELF ADMINISTERED DRUGS (ALT 637 FOR MEDICARE OP): Performed by: NURSE PRACTITIONER

## 2025-06-21 PROCEDURE — 85045 AUTOMATED RETICULOCYTE COUNT: CPT

## 2025-06-21 PROCEDURE — 80053 COMPREHEN METABOLIC PANEL: CPT | Performed by: NURSE PRACTITIONER

## 2025-06-21 PROCEDURE — 85730 THROMBOPLASTIN TIME PARTIAL: CPT

## 2025-06-21 PROCEDURE — 2500000002 HC RX 250 W HCPCS SELF ADMINISTERED DRUGS (ALT 637 FOR MEDICARE OP, ALT 636 FOR OP/ED)

## 2025-06-21 PROCEDURE — 85025 COMPLETE CBC W/AUTO DIFF WBC: CPT

## 2025-06-21 RX ORDER — BISACODYL 10 MG/1
10 SUPPOSITORY RECTAL DAILY PRN
Status: DISCONTINUED | OUTPATIENT
Start: 2025-06-21 | End: 2025-06-23 | Stop reason: HOSPADM

## 2025-06-21 RX ORDER — OXYCODONE HYDROCHLORIDE 10 MG/1
10 TABLET ORAL ONCE
Refills: 0 | Status: COMPLETED | OUTPATIENT
Start: 2025-06-21 | End: 2025-06-21

## 2025-06-21 RX ORDER — OXYCODONE HYDROCHLORIDE 10 MG/1
10 TABLET ORAL EVERY 4 HOURS PRN
Refills: 0 | Status: DISCONTINUED | OUTPATIENT
Start: 2025-06-21 | End: 2025-06-22

## 2025-06-21 RX ADMIN — OXYCODONE HYDROCHLORIDE AND ACETAMINOPHEN 500 MG: 500 TABLET ORAL at 09:33

## 2025-06-21 RX ADMIN — HYDROMORPHONE HYDROCHLORIDE 1 MG: 1 INJECTION, SOLUTION INTRAMUSCULAR; INTRAVENOUS; SUBCUTANEOUS at 12:30

## 2025-06-21 RX ADMIN — HYDROMORPHONE HYDROCHLORIDE 1 MG: 1 INJECTION, SOLUTION INTRAMUSCULAR; INTRAVENOUS; SUBCUTANEOUS at 17:13

## 2025-06-21 RX ADMIN — HYDROMORPHONE HYDROCHLORIDE 1 MG: 1 INJECTION, SOLUTION INTRAMUSCULAR; INTRAVENOUS; SUBCUTANEOUS at 07:11

## 2025-06-21 RX ADMIN — WARFARIN SODIUM 5 MG: 5 TABLET ORAL at 17:13

## 2025-06-21 RX ADMIN — METOPROLOL TARTRATE 25 MG: 25 TABLET, FILM COATED ORAL at 20:45

## 2025-06-21 RX ADMIN — HYDROMORPHONE HYDROCHLORIDE 1 MG: 1 INJECTION, SOLUTION INTRAMUSCULAR; INTRAVENOUS; SUBCUTANEOUS at 02:55

## 2025-06-21 RX ADMIN — ATROPINE SULFATE 1 DROP: 10 SOLUTION/ DROPS OPHTHALMIC at 20:45

## 2025-06-21 RX ADMIN — HYDROMORPHONE HYDROCHLORIDE 1 MG: 1 INJECTION, SOLUTION INTRAMUSCULAR; INTRAVENOUS; SUBCUTANEOUS at 04:59

## 2025-06-21 RX ADMIN — FOLIC ACID 1 MG: 1 TABLET ORAL at 09:33

## 2025-06-21 RX ADMIN — HYDROMORPHONE HYDROCHLORIDE 1 MG: 1 INJECTION, SOLUTION INTRAMUSCULAR; INTRAVENOUS; SUBCUTANEOUS at 23:09

## 2025-06-21 RX ADMIN — OXYCODONE HYDROCHLORIDE 10 MG: 10 TABLET ORAL at 11:02

## 2025-06-21 RX ADMIN — HYDROMORPHONE HYDROCHLORIDE 1 MG: 1 INJECTION, SOLUTION INTRAMUSCULAR; INTRAVENOUS; SUBCUTANEOUS at 20:45

## 2025-06-21 RX ADMIN — OXYCODONE HYDROCHLORIDE 10 MG: 10 TABLET ORAL at 19:10

## 2025-06-21 RX ADMIN — CETIRIZINE HYDROCHLORIDE 10 MG: 10 TABLET, FILM COATED ORAL at 09:33

## 2025-06-21 RX ADMIN — HYDROMORPHONE HYDROCHLORIDE 1 MG: 1 INJECTION, SOLUTION INTRAMUSCULAR; INTRAVENOUS; SUBCUTANEOUS at 09:32

## 2025-06-21 RX ADMIN — OXYCODONE HYDROCHLORIDE 10 MG: 10 TABLET ORAL at 15:00

## 2025-06-21 ASSESSMENT — PAIN - FUNCTIONAL ASSESSMENT

## 2025-06-21 ASSESSMENT — PAIN SCALES - GENERAL
PAINLEVEL_OUTOF10: 7
PAINLEVEL_OUTOF10: 6
PAINLEVEL_OUTOF10: 5 - MODERATE PAIN
PAINLEVEL_OUTOF10: 9
PAINLEVEL_OUTOF10: 5 - MODERATE PAIN
PAINLEVEL_OUTOF10: 4
PAINLEVEL_OUTOF10: 5 - MODERATE PAIN
PAINLEVEL_OUTOF10: 6
PAINLEVEL_OUTOF10: 6
PAINLEVEL_OUTOF10: 9
PAINLEVEL_OUTOF10: 6
PAINLEVEL_OUTOF10: 9
PAINLEVEL_OUTOF10: 6
PAINLEVEL_OUTOF10: 6
PAINLEVEL_OUTOF10: 8
PAINLEVEL_OUTOF10: 9
PAINLEVEL_OUTOF10: 6
PAINLEVEL_OUTOF10: 5 - MODERATE PAIN

## 2025-06-21 ASSESSMENT — COGNITIVE AND FUNCTIONAL STATUS - GENERAL
CLIMB 3 TO 5 STEPS WITH RAILING: A LITTLE
CLIMB 3 TO 5 STEPS WITH RAILING: A LITTLE
MOBILITY SCORE: 23
DAILY ACTIVITIY SCORE: 24
MOBILITY SCORE: 23
DAILY ACTIVITIY SCORE: 24

## 2025-06-21 NOTE — CARE PLAN
Problem: Pain - Adult  Goal: Verbalizes/displays adequate comfort level or baseline comfort level  Outcome: Progressing     Problem: Safety - Adult  Goal: Free from fall injury  Outcome: Progressing     Problem: Discharge Planning  Goal: Discharge to home or other facility with appropriate resources  Outcome: Progressing     Problem: Chronic Conditions and Co-morbidities  Goal: Patient's chronic conditions and co-morbidity symptoms are monitored and maintained or improved  Outcome: Progressing     Problem: Nutrition  Goal: Nutrient intake appropriate for maintaining nutritional needs  Outcome: Progressing     Problem: Fall/Injury  Goal: Not fall by end of shift  Outcome: Progressing  Goal: Be free from injury by end of the shift  Outcome: Progressing  Goal: Verbalize understanding of personal risk factors for fall in the hospital  Outcome: Progressing  Goal: Verbalize understanding of risk factor reduction measures to prevent injury from fall in the home  Outcome: Progressing  Goal: Use assistive devices by end of the shift  Outcome: Progressing  Goal: Pace activities to prevent fatigue by end of the shift  Outcome: Progressing     Problem: Pain  Goal: Takes deep breaths with improved pain control throughout the shift  Outcome: Progressing  Goal: Turns in bed with improved pain control throughout the shift  Outcome: Progressing  Goal: Walks with improved pain control throughout the shift  Outcome: Progressing  Goal: Performs ADL's with improved pain control throughout shift  Outcome: Progressing  Goal: Participates in PT with improved pain control throughout the shift  Outcome: Progressing  Goal: Free from opioid side effects throughout the shift  Outcome: Progressing  Goal: Free from acute confusion related to pain meds throughout the shift  Outcome: Progressing    The clinical goals for the shift include Pt will remain HDS and VSS while remaining free from injury during the shift on 6/20 and 6/21    Patient has  remained stable. Her dressing on her femoral line was changed. She has been able to ambulate to the bathroom and has received her normal medications. She has been receiving dilaudid for her generalized pain.

## 2025-06-21 NOTE — CARE PLAN
Problem: Pain - Adult  Goal: Verbalizes/displays adequate comfort level or baseline comfort level  Outcome: Progressing     Problem: Safety - Adult  Goal: Free from fall injury  Outcome: Progressing     Problem: Discharge Planning  Goal: Discharge to home or other facility with appropriate resources  Outcome: Progressing     Problem: Chronic Conditions and Co-morbidities  Goal: Patient's chronic conditions and co-morbidity symptoms are monitored and maintained or improved  Outcome: Progressing     Problem: Nutrition  Goal: Nutrient intake appropriate for maintaining nutritional needs  Outcome: Progressing     Problem: Fall/Injury  Goal: Not fall by end of shift  Outcome: Progressing  Goal: Be free from injury by end of the shift  Outcome: Progressing  Goal: Verbalize understanding of personal risk factors for fall in the hospital  Outcome: Progressing  Goal: Verbalize understanding of risk factor reduction measures to prevent injury from fall in the home  Outcome: Progressing  Goal: Use assistive devices by end of the shift  Outcome: Progressing  Goal: Pace activities to prevent fatigue by end of the shift  Outcome: Progressing     Problem: Pain  Goal: Takes deep breaths with improved pain control throughout the shift  Outcome: Progressing  Goal: Turns in bed with improved pain control throughout the shift  Outcome: Progressing  Goal: Walks with improved pain control throughout the shift  Outcome: Progressing  Goal: Performs ADL's with improved pain control throughout shift  Outcome: Progressing  Goal: Participates in PT with improved pain control throughout the shift  Outcome: Progressing  Goal: Free from opioid side effects throughout the shift  Outcome: Progressing  Goal: Free from acute confusion related to pain meds throughout the shift  Outcome: Progressing   The patient's goals for the shift include      The clinical goals for the shift include pt will remain HDS and VSS throughout shift    Patient remained  HDS and VSS throughout shift. Patient began rotating oral and IV pain meds which were given throughout the day for pain control. Patient is still having pain and plan is to continue with pain regimen.

## 2025-06-21 NOTE — NURSING NOTE
Patient's telemetry batteries changed.  Telemetry monitor turned back on and functioning appropriately.    Patient's telemetry strip completed w/ oncoming/off-going RN.   Strip reflected in the flowsheets and hard copy placed in patient's chart.     Elham Ford RN

## 2025-06-21 NOTE — PROGRESS NOTES
"Senait Narvaez is a 55 y.o. female on day 4 of admission presenting with Sickle cell pain crisis (Multi).    Subjective   Pt denies c/o dizziness, headache, CP/SOB. Reports improved pain overall from admission, but persistent pain at legs/back. Open to plan to begin oral/IV pain med rotation today.     Objective     Physical Exam  Vitals reviewed.   Constitutional:       Appearance: She is obese.   HENT:      Head: Normocephalic and atraumatic.      Mouth/Throat:      Mouth: Mucous membranes are moist.      Pharynx: Oropharynx is clear.   Eyes:      Pupils: Pupils are equal, round, and reactive to light.   Cardiovascular:      Rate and Rhythm: Normal rate and regular rhythm.      Pulses: Normal pulses.      Heart sounds: Normal heart sounds.   Pulmonary:      Effort: Pulmonary effort is normal.      Breath sounds: Normal breath sounds.   Abdominal:      General: Bowel sounds are normal.      Palpations: Abdomen is soft.   Musculoskeletal:         General: Normal range of motion.      Cervical back: Normal range of motion and neck supple.   Skin:     General: Skin is warm and dry.      Capillary Refill: Capillary refill takes less than 2 seconds.   Neurological:      General: No focal deficit present.      Mental Status: She is alert and oriented to person, place, and time.   Psychiatric:         Mood and Affect: Mood normal.         Last Recorded Vitals  Blood pressure 116/72, pulse 78, temperature 35.9 °C (96.6 °F), temperature source Temporal, resp. rate 16, height 1.651 m (5' 5\"), weight 96.5 kg (212 lb 11.9 oz), SpO2 95%.  Intake/Output last 3 Shifts:  I/O last 3 completed shifts:  In: 1040 (10.8 mL/kg) [P.O.:1040]  Out: - (0 mL/kg)   Weight: 96.5 kg     Relevant Results  Labs reviewed   Scheduled medications  Scheduled Medications[1]  Continuous medications  Continuous Medications[2]  PRN medications  PRN Medications[3]    This patient has a central line   Reason for the central line remaining today? Parenteral " medication      Assessment & Plan  Sickle cell pain crisis (Multi)    Senait Narvaez is a 55 y.o. 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 Orthos negative. CXR (6/17) neg for acute process. Hgb and lysis labs near baseline on admit. Started on IV Dilaudid per care path. Transfusion med consulted for routine inpatient exchange transfusion, s/p femoral apheresis line placement 6/18, s/p RBCex 6/19. R shoulder xray (6/17) negative. DC home resumed noc O2 pending pain improvement.      Updates 6/21:   - begin Oxycodone/IV Dilaudid pain med rotation today  - Continue to hold home lasix dose     # Dizziness, resolved  # Syncope, resolved  - likely 2/2 cardiogenic causes vs dehydration   - pt reports dizziness (started ~3 weeks ago) with one episode of syncope 3 weeks ago  - EKG on admit (6/17) NSR with R axis deviation with R ventricular hypertrophy  - Last TTE 5/9/25: HFpEF with EF of 67%, severely enlarged RV with decreased function, elevated RVSP  - troponin on admit (6/17) 4  - orthostatic vitals ordered (6/18) negative  - BNP (6/18) 118  - lactate (6/18) 0.6  - can consider RHC if symptoms continue  - telemetry ordered  - Will plan to discharge patient with Ziopatch and outpatient cardiology f/u     # Hb SC disease   # Acute on chronic SCD related pain   # routine inpatient exchange  - currently on q6 week RBC exchange transfusions (most recent outpatient 4/18 and inpatient 5/8)  - [facility]path reviewed, last updated 4/16/25: IV Dilaudid 1-1.5 mg every 2-3 hours  prn for severe pain   - OARRS reviewed, no aberrant behavior noted   - CXR (6/17) neg for acute process  - EKG (6/17) NSR with R axis deviation with R ventricular hypertrophy  - Ortho BP negative (6/18)  - Covid/Flu negative (6/17)  - 6/19: Colonization swab positive for MSSA  - Hgb BL ~ 8, Hgb 9.4 on admission (6/17) - no indication for simple transfusion  - tbili BL ~ 1.0, 1.7 on admission (6/17)--> 1.6 (6/18)  - LDH ~150-200,  on admission (6/17)--> 179 (6/18)  - s/p IV Dilaudid 1mg q3h PRN for severe pain (6/17-6/18)  - start IV Dilaudid 1mg Q2 PRN for severe pain (6/18-current),  - Will add oxycodone 10,g every 4hrs PRN today and rotate with Dilaudid IV dosing  - c/w Cyclobenzaprine 10 mg TID PRN   - Bowel regimen for opioid induced constipation with Senna 2tabs nightly and Miralax daily (LBM 6/19), PO Benadryl 25 mg q6h PRN for opioid-induced pruritus, PO Zofran 8 mg q8h PRN for opioid-induced nausea  - c/w home folic acid 1 mg daily  - Utox pending collection (6/17)  - Hgb S 28.6% (6/17)  - Lactate negative 0.6 6/18  - Transfusion med consulted per Dr. Whaley (6/17), s/p routine inpatient exchange 6/19, post Hgb S= 12.5%  - s/p femoral apheresis line placement 6/18, plan for removal prior to discharge  - ionized CA 1.09 (6/17), IV calcium chloride 0.5mg ordered,   - ionized Ca post exchange 1.06, s/p 0.5mg ca gluconate once   - Parvo ordered (6/18) results pending --- retic improved 0 --> 3% post exchange  - R shoulder xray (6/17) negative  - ESR 1.18/CRP 14 (6/17)  - IS encouraged     # HFpEF  # hx pHTN  # hx of troponinemia   - Last TTE 5/9/25: HFpEF with EF of 67% right ventricular overloaded, severely enlarged RV with decreased function, elevated RVSP  - hx of elevated troponins on admissions, likely leak i/s/o demand  - on lasix 20 every other day at home, held for 6/20 per Dr. Hensley   - Metoprolol dose decreased for hypotension, back to home dose of 25 mg BID on 6/20     # hx of DVT/PE  # hx  of SVC syndrome  - home warfarin held on admission for apheresis line placement, RESTARTED post fem line placement for exchange (6/18)  - SCDs, encouraged ambulation   - Daily Coags to monitor INR, 6/20 = 1.5  (goal 2-3) -- per attending, will keep warfarin on for now (pt has been previously bridged with lovenox but was restarted back on warfarin d/t prior conversation with pharmacy on 6/18)     # hx of SVT  - continue home metoprolol tartrate 25 mg BID --> 6/19 dose adjusted 12.5mg BID --> 6/20 per Dr. Hensley increase back to home dose but keep lasix off for now   - c/w tele, noteable for PVCs     # CKD II  - BL~ 1.1-2  - avoid nephrotoxic meds and IVF     # Dispo  - Full code, confirmed on admit  - DC home resumed noc O2 pending routine exchange and pain control, likely 6/22 Sun  - FUV 6/23 Dr. Whaley      I spent >45 minutes in the professional and overall care of this patient.      Taylor Patel, APRN-CNP         [1] ascorbic acid, 500 mg, oral, Daily  atropine, 1 drop, Left Eye, Nightly  cetirizine, 10 mg, oral, Daily  folic acid, 1 mg, oral, Daily  [Held by provider] furosemide, 20 mg, oral, Every other day  heparin flush, 50 Units, intra-catheter, q8h  metoprolol tartrate, 25 mg, oral, BID  polyethylene glycol, 17 g, oral, Daily  sennosides-docusate sodium, 2 tablet, oral, Nightly  warfarin, 5 mg, oral, Daily  white petrolatum, 2 Application, Topical, BID  [2] oxygen, , Last Rate: 2 L/min (06/17/25 9705)  [3] PRN medications: albuterol, alteplase, benzocaine-menthol, bisacodyl, cyclobenzaprine, diphenhydrAMINE, fluticasone, heparin flush, HYDROmorphone, ondansetron ODT, oxyCODONE, phenoL

## 2025-06-21 NOTE — SIGNIFICANT EVENT
Rapid Response Nurse Note: RADAR alert: 7    Pager time: [phone]  Arrival time: 825  Event end time: 830  Location: [place] [address]  [x] Triage by phone or secure messaging    Rapid response initiated by:  [] Rapid response RN [] Family [] Nursing Supervisor [] Physician   [x] RADAR auto page [] Sepsis auto-page [] RN [] RT   [] NP/PA [] Other:     Primary reason for call:   [] BAT [] New CPAP/BiPAP [] Bleeding [] Change in mental status   [] Chest pain [] Code blue [] FiO2 >/= 50% [] HR </= 40 bpm   [] HR >/= 130 bpm [] Hyperglycemia [] Hypoglycemia [x] RADAR    [] RR </= 8 bpm [] RR >/= 30 bpm [] SBP </= 90 mmHg [] SpO2 < 90%   [] Seizure [] Sepsis [] Shortness of breath  [] Staff concern: see comments     Initial VS and/or RADAR VS: T 36.0 °C; HR 77; RR 16; BP 99/63; SPO2 92%.    Providers present at bedside (if applicable):     Name of ICU Provider contacted (if applicable):     Interventions:  [x] None [] ABG/VBG [] Assist w/ICU transfer [] BAT paged    [] Bag mask [] Blood [] Cardioversion [] Code Blue   [] Code blue for intubation [] Code status changed [] Chest x-ray [] EKG   [] IV fluid/bolus [] KUB x-ray [] Labs/cultures [] Medication   [] Nebulizer treatment [] NIPPV (CPAP/BiPAP) [] Oxygen [] Oral airway   [] Peripheral IV [] Palliative care consult [] CT/MRI [] Sepsis protocol    [] Suctioned [] Other:     Outcome:  [] Coded and  [] Code blue for intubation [] Coded and transferred to ICU []  on division   [x] Remained on division (no change) [] Remained on division + additional monitoring [] Remained in ED [] Transferred to ED   [] Transferred to ICU [] Transferred to inpatient status [] Transferred for interventions (procedure) [] Transferred to ICU stepdown    [] Transferred to surgery [] Transferred to telemetry [] Sepsis protocol [] STEMI protocol   [] Stroke protocol [x] Bedside nurse instructed to page rapid response for any concerns or acute change in condition/VS     Additional Comments:      Radar auto-page received for a radar score of 7 with the above listed vital signs.  Vital signs were confirmed and reviewed with the primary RN.  She is at her current baseline and the primary RN has no present concerns.  There are no indications for interventions by Rapid Response at this time.  RN to contact Rapid Response with any future concerns or signs of clinical decompensation.

## 2025-06-22 VITALS
RESPIRATION RATE: 18 BRPM | HEART RATE: 60 BPM | DIASTOLIC BLOOD PRESSURE: 57 MMHG | BODY MASS INDEX: 35.45 KG/M2 | TEMPERATURE: 97.2 F | SYSTOLIC BLOOD PRESSURE: 91 MMHG | HEIGHT: 65 IN | WEIGHT: 212.74 LBS | OXYGEN SATURATION: 95 %

## 2025-06-22 LAB
ALBUMIN SERPL BCP-MCNC: 3.3 G/DL (ref 3.4–5)
ALP SERPL-CCNC: 119 U/L (ref 33–110)
ALT SERPL W P-5'-P-CCNC: 7 U/L (ref 7–45)
ANION GAP SERPL CALC-SCNC: 10 MMOL/L (ref 10–20)
APTT PPP: 34 SECONDS (ref 26–36)
AST SERPL W P-5'-P-CCNC: 15 U/L (ref 9–39)
B19V IGG SER IA-ACNC: 0.48 IV
B19V IGM SER IA-ACNC: 0.5 IV
BASOPHILS # BLD AUTO: 0.03 X10*3/UL (ref 0–0.1)
BASOPHILS NFR BLD AUTO: 0.3 %
BILIRUB SERPL-MCNC: 1.1 MG/DL (ref 0–1.2)
BUN SERPL-MCNC: 18 MG/DL (ref 6–23)
CALCIUM SERPL-MCNC: 8.9 MG/DL (ref 8.6–10.6)
CHLORIDE SERPL-SCNC: 101 MMOL/L (ref 98–107)
CO2 SERPL-SCNC: 31 MMOL/L (ref 21–32)
CREAT SERPL-MCNC: 0.89 MG/DL (ref 0.5–1.05)
EGFRCR SERPLBLD CKD-EPI 2021: 76 ML/MIN/1.73M*2
EOSINOPHIL # BLD AUTO: 0.42 X10*3/UL (ref 0–0.7)
EOSINOPHIL NFR BLD AUTO: 4.8 %
ERYTHROCYTE [DISTWIDTH] IN BLOOD BY AUTOMATED COUNT: 27.8 % (ref 11.5–14.5)
GLUCOSE SERPL-MCNC: 91 MG/DL (ref 74–99)
HCT VFR BLD AUTO: 27.1 % (ref 36–46)
HGB BLD-MCNC: 8.3 G/DL (ref 12–16)
HGB RETIC QN: 24 PG (ref 28–38)
IMM GRANULOCYTES # BLD AUTO: 0.04 X10*3/UL (ref 0–0.7)
IMM GRANULOCYTES NFR BLD AUTO: 0.5 % (ref 0–0.9)
IMMATURE RETIC FRACTION: 30.5 %
INR PPP: 2 (ref 0.9–1.1)
LDH SERPL L TO P-CCNC: 202 U/L (ref 84–246)
LYMPHOCYTES # BLD AUTO: 1.1 X10*3/UL (ref 1.2–4.8)
LYMPHOCYTES NFR BLD AUTO: 12.5 %
MCH RBC QN AUTO: 24.5 PG (ref 26–34)
MCHC RBC AUTO-ENTMCNC: 30.6 G/DL (ref 32–36)
MCV RBC AUTO: 80 FL (ref 80–100)
MONOCYTES # BLD AUTO: 0.84 X10*3/UL (ref 0.1–1)
MONOCYTES NFR BLD AUTO: 9.5 %
NEUTROPHILS # BLD AUTO: 6.4 X10*3/UL (ref 1.2–7.7)
NEUTROPHILS NFR BLD AUTO: 72.4 %
NRBC BLD-RTO: 11.1 /100 WBCS (ref 0–0)
PLATELET # BLD AUTO: 264 X10*3/UL (ref 150–450)
POTASSIUM SERPL-SCNC: 4.5 MMOL/L (ref 3.5–5.3)
PROT SERPL-MCNC: 6.5 G/DL (ref 6.4–8.2)
PROTHROMBIN TIME: 22.1 SECONDS (ref 9.8–12.4)
RBC # BLD AUTO: 3.39 X10*6/UL (ref 4–5.2)
RETICS #: 0.14 X10*6/UL (ref 0.02–0.08)
RETICS/RBC NFR AUTO: 4.2 % (ref 0.5–2)
SODIUM SERPL-SCNC: 137 MMOL/L (ref 136–145)
WBC # BLD AUTO: 8.8 X10*3/UL (ref 4.4–11.3)

## 2025-06-22 PROCEDURE — 80053 COMPREHEN METABOLIC PANEL: CPT

## 2025-06-22 PROCEDURE — 2500000001 HC RX 250 WO HCPCS SELF ADMINISTERED DRUGS (ALT 637 FOR MEDICARE OP): Performed by: NURSE PRACTITIONER

## 2025-06-22 PROCEDURE — 2500000001 HC RX 250 WO HCPCS SELF ADMINISTERED DRUGS (ALT 637 FOR MEDICARE OP)

## 2025-06-22 PROCEDURE — 36415 COLL VENOUS BLD VENIPUNCTURE: CPT

## 2025-06-22 PROCEDURE — 2500000001 HC RX 250 WO HCPCS SELF ADMINISTERED DRUGS (ALT 637 FOR MEDICARE OP): Performed by: PHYSICIAN ASSISTANT

## 2025-06-22 PROCEDURE — 83615 LACTATE (LD) (LDH) ENZYME: CPT

## 2025-06-22 PROCEDURE — 2500000004 HC RX 250 GENERAL PHARMACY W/ HCPCS (ALT 636 FOR OP/ED)

## 2025-06-22 PROCEDURE — 2500000002 HC RX 250 W HCPCS SELF ADMINISTERED DRUGS (ALT 637 FOR MEDICARE OP, ALT 636 FOR OP/ED)

## 2025-06-22 PROCEDURE — 1170000001 HC PRIVATE ONCOLOGY ROOM DAILY

## 2025-06-22 PROCEDURE — 2500000004 HC RX 250 GENERAL PHARMACY W/ HCPCS (ALT 636 FOR OP/ED): Mod: JZ,TB | Performed by: NURSE PRACTITIONER

## 2025-06-22 PROCEDURE — 85045 AUTOMATED RETICULOCYTE COUNT: CPT

## 2025-06-22 PROCEDURE — 99233 SBSQ HOSP IP/OBS HIGH 50: CPT | Performed by: NURSE PRACTITIONER

## 2025-06-22 PROCEDURE — 85025 COMPLETE CBC W/AUTO DIFF WBC: CPT

## 2025-06-22 PROCEDURE — 85730 THROMBOPLASTIN TIME PARTIAL: CPT

## 2025-06-22 PROCEDURE — 2500000004 HC RX 250 GENERAL PHARMACY W/ HCPCS (ALT 636 FOR OP/ED): Mod: JZ,TB

## 2025-06-22 RX ORDER — HYDROMORPHONE HYDROCHLORIDE 1 MG/ML
1 INJECTION, SOLUTION INTRAMUSCULAR; INTRAVENOUS; SUBCUTANEOUS EVERY 4 HOURS PRN
Status: DISCONTINUED | OUTPATIENT
Start: 2025-06-22 | End: 2025-06-23 | Stop reason: HOSPADM

## 2025-06-22 RX ORDER — OXYCODONE HYDROCHLORIDE 10 MG/1
10 TABLET ORAL EVERY 4 HOURS PRN
Refills: 0 | Status: DISCONTINUED | OUTPATIENT
Start: 2025-06-22 | End: 2025-06-23 | Stop reason: HOSPADM

## 2025-06-22 RX ORDER — AMOXICILLIN 250 MG
2 CAPSULE ORAL 2 TIMES DAILY
Status: DISCONTINUED | OUTPATIENT
Start: 2025-06-22 | End: 2025-06-23 | Stop reason: HOSPADM

## 2025-06-22 RX ADMIN — METOPROLOL TARTRATE 25 MG: 25 TABLET, FILM COATED ORAL at 20:40

## 2025-06-22 RX ADMIN — OXYCODONE HYDROCHLORIDE 10 MG: 10 TABLET ORAL at 20:41

## 2025-06-22 RX ADMIN — HEPARIN, PORCINE (PF) 10 UNIT/ML INTRAVENOUS SYRINGE 50 UNITS: at 18:29

## 2025-06-22 RX ADMIN — FOLIC ACID 1 MG: 1 TABLET ORAL at 09:03

## 2025-06-22 RX ADMIN — HEPARIN, PORCINE (PF) 10 UNIT/ML INTRAVENOUS SYRINGE 50 UNITS: at 11:22

## 2025-06-22 RX ADMIN — CYCLOBENZAPRINE 10 MG: 10 TABLET, FILM COATED ORAL at 09:06

## 2025-06-22 RX ADMIN — OXYCODONE HYDROCHLORIDE AND ACETAMINOPHEN 500 MG: 500 TABLET ORAL at 09:01

## 2025-06-22 RX ADMIN — HYDROMORPHONE HYDROCHLORIDE 1 MG: 1 INJECTION, SOLUTION INTRAMUSCULAR; INTRAVENOUS; SUBCUTANEOUS at 01:55

## 2025-06-22 RX ADMIN — OXYCODONE HYDROCHLORIDE 10 MG: 10 TABLET ORAL at 11:17

## 2025-06-22 RX ADMIN — METOPROLOL TARTRATE 25 MG: 25 TABLET, FILM COATED ORAL at 09:02

## 2025-06-22 RX ADMIN — CETIRIZINE HYDROCHLORIDE 10 MG: 10 TABLET, FILM COATED ORAL at 09:01

## 2025-06-22 RX ADMIN — HYDROMORPHONE HYDROCHLORIDE 1 MG: 1 INJECTION, SOLUTION INTRAMUSCULAR; INTRAVENOUS; SUBCUTANEOUS at 13:37

## 2025-06-22 RX ADMIN — OXYCODONE HYDROCHLORIDE 10 MG: 10 TABLET ORAL at 16:42

## 2025-06-22 RX ADMIN — WARFARIN SODIUM 5 MG: 5 TABLET ORAL at 18:24

## 2025-06-22 RX ADMIN — HYDROMORPHONE HYDROCHLORIDE 1 MG: 1 INJECTION, SOLUTION INTRAMUSCULAR; INTRAVENOUS; SUBCUTANEOUS at 05:19

## 2025-06-22 RX ADMIN — HYDROMORPHONE HYDROCHLORIDE 1 MG: 1 INJECTION, SOLUTION INTRAMUSCULAR; INTRAVENOUS; SUBCUTANEOUS at 22:55

## 2025-06-22 RX ADMIN — ATROPINE SULFATE 1 DROP: 10 SOLUTION/ DROPS OPHTHALMIC at 20:40

## 2025-06-22 RX ADMIN — HYDROMORPHONE HYDROCHLORIDE 1 MG: 1 INJECTION, SOLUTION INTRAMUSCULAR; INTRAVENOUS; SUBCUTANEOUS at 09:01

## 2025-06-22 RX ADMIN — HYDROMORPHONE HYDROCHLORIDE 1 MG: 1 INJECTION, SOLUTION INTRAMUSCULAR; INTRAVENOUS; SUBCUTANEOUS at 18:28

## 2025-06-22 ASSESSMENT — COGNITIVE AND FUNCTIONAL STATUS - GENERAL
DAILY ACTIVITIY SCORE: 24
DAILY ACTIVITIY SCORE: 24
MOBILITY SCORE: 24
MOBILITY SCORE: 24

## 2025-06-22 ASSESSMENT — PAIN SCALES - GENERAL
PAINLEVEL_OUTOF10: 8
PAINLEVEL_OUTOF10: 9
PAINLEVEL_OUTOF10: 7
PAINLEVEL_OUTOF10: 8
PAINLEVEL_OUTOF10: 7
PAINLEVEL_OUTOF10: 8

## 2025-06-22 ASSESSMENT — PAIN DESCRIPTION - LOCATION
LOCATION: GENERALIZED
LOCATION: GENERALIZED

## 2025-06-22 NOTE — ASSESSMENT & PLAN NOTE
Senait Narvaez is a 55 y.o. 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 Orthos negative. CXR (6/17) neg for acute process. Hgb and lysis labs near baseline on admit. Started on IV Dilaudid per care path. Transfusion med consulted for routine inpatient exchange transfusion, s/p femoral apheresis line placement 6/18, s/p RBCex 6/19. R shoulder xray (6/17) negative. Continues on IV dilaudid and Oxycodone for pain management. DC with home resumed noc O2 pending improvement in pain.     Updates 6/22:   - Continue current pain regimen      # Dizziness, resolved  # Syncope, resolved  - Likely 2/2 cardiogenic causes vs dehydration   - Pt reports dizziness (started ~3 weeks ago) with one episode of syncope 3 weeks ago  - EKG on admit (6/17) NSR with R axis deviation with R ventricular hypertrophy  - Last TTE 5/9/25: HFpEF with EF of 67%, severely enlarged RV with decreased function, elevated RVSP  - Troponin on admit (6/17) 4  - Orthostatic vitals (6/18) negative  - BNP (6/18) 118, lactate (6/18) 0.6  - Telemetry ordered  - Will plan to discharge patient with Ziopatch and outpatient cardiology f/u     # Hb SC disease   # Acute on chronic SCD related pain   - Currently on q6 week RBC exchange transfusions (most recent outpatient 4/18 and inpatient 5/8)  - Carepath reviewed, last updated 4/16/25: IV Dilaudid 1-1.5 mg every 2-3 hours prn for severe pain   - OARRS reviewed, no aberrant behavior noted   - CXR (6/17) neg for acute process  - EKG (6/17) NSR with R axis  deviation with R ventricular hypertrophy  - Covid/Flu negative (6/17)  - 6/19: Colonization swab positive for MSSA  - Hgb BL ~ 8, Hgb 9.4 on admission (6/17)--> 8.3 (6/22)) - no indication for simple transfusion  - Tbili BL ~ 1.0, 1.7 on admission (6/17)--> 1.6 (6/18)--> 1.1 (6/22)  - LDH ~150-200,  on admission (6/17)--> 179 (6/18)--> 202 (6/22)  - s/p IV Dilaudid 1mg q3h PRN for severe pain (6/17-6/18)  - s/p IV Dilaudid 1mg Q2 PRN for severe pain (6/18-6/21)  - Started rotating pain regimen (6/21-current) with oxycodone 10mg q4hrs PRN severe pain and IV dilaudid 1mg q4hrs PRN severe pain to rotate for q2hr pain meds if needed  - Continue home Cyclobenzaprine 10mg TID PRN muscle spasms and folic acid 1mg daily  - Bowel regimen for opioid induced constipation with DocuSenna 2tabs BID and Miralax daily; LBM 6/21  - PO Benadryl PRN for opioid-induced pruritus, PO Zofran PRN for opioid-induced nausea  - Utox pending collection (6/17)  - Hgb S (6/17): 28.6% --> 12.5% (6/19)  - Lactate negative 0.6 6/18  - Transfusion med consulted per Dr. Whaley (6/17), s/p routine inpatient exchange 6/19, post Hgb S 12.5%  - s/p femoral apheresis line placement 6/18, plan for removal prior to discharge  - Ionized CA 1.09 (6/17), IV calcium chloride 0.5mg ordered  - Ionized Ca post exchange 1.06, s/p 0.5mg ca gluconate once   - Parvo ordered (6/18) results pending --- retic improved 0 --> 3% post exchange  - R shoulder xray (6/17) negative  - ESR 1.18/CRP 14 (6/17)  - IS encouraged     # HFpEF  # Hx pHTN  # Hx of troponinemia   - Last TTE 5/9/25: HFpEF with EF of 67% right ventricular overloaded, severely enlarged RV with decreased function, elevated RVSP  - Hx of elevated troponins on admissions, likely leak i/s/o demand  - On lasix 20 every other day at home, held for 6/20 per Dr. Hensley   - Metoprolol dose decreased for hypotension, back to home dose of 25mg BID on 6/20     # Hx of DVT/PE  # Hx of SVC syndrome  - Home  warfarin held on admission for apheresis line placement, RESTARTED post fem line placement for exchange (6/18)  - SCDs, encouraged ambulation   - Daily Coags to monitor INR, 6/22 = 2.0  (goal 2-3) -- per attending, will keep warfarin on for now (pt has been previously bridged with lovenox but was restarted back on warfarin d/t prior conversation with pharmacy on 6/18)     # Hx of SVT  - Continue home metoprolol tartrate 25 mg BID --> 6/19 dose adjusted 12.5mg BID --> 6/20 per Dr. Hensley increase back to home dose but keep lasix off for now   - Continue tele, noteable for PVCs     # CKD II  - BL~ 1.1-2; sCr 0.89 (6/22)  - Avoid nephrotoxic meds and IVF     DISPO:  - Full code, confirmed on admit  - DC home with resumed noc O2 pending improvement in pain, likely 6/23  - Sickle Cell FUV 6/23 (requested reschedule), Breast imaging 7/30, RBCEx 7/31, Surg Onc FUV 8/1, Ophthalmology FUV 8/6, Cardiology FUV 8/25

## 2025-06-22 NOTE — PROGRESS NOTES
"Senait Narvaez is a 55 y.o. female on day 5 of admission presenting with Sickle cell pain crisis (Multi).    Subjective   Seen this AM at the bedside. Pt states she has continued pain in her arms and legs but it is slowly improving. She would like to keep her pain regimen the same today. She denies any other concerns. LBM 6/21. She denies any fevers/chills, SOB, or CP.    Objective     Physical Exam  Vitals reviewed.   Constitutional:       Appearance: She is obese.   HENT:      Head: Normocephalic and atraumatic.      Nose: Nose normal.      Mouth/Throat:      Mouth: Mucous membranes are moist.      Pharynx: Oropharynx is clear.   Eyes:      Extraocular Movements: Extraocular movements intact.      Pupils: Pupils are equal, round, and reactive to light.   Cardiovascular:      Rate and Rhythm: Normal rate and regular rhythm.      Pulses: Normal pulses.      Heart sounds: Normal heart sounds.   Pulmonary:      Effort: Pulmonary effort is normal.      Breath sounds: Normal breath sounds.   Abdominal:      General: Bowel sounds are normal.      Palpations: Abdomen is soft.   Musculoskeletal:         General: Normal range of motion.   Skin:     General: Skin is warm.   Neurological:      General: No focal deficit present.      Mental Status: She is alert and oriented to person, place, and time. Mental status is at baseline.   Psychiatric:         Mood and Affect: Mood normal.         Behavior: Behavior normal.       Last Recorded Vitals  Blood pressure 112/72, pulse 72, temperature 36.8 °C (98.2 °F), temperature source Temporal, resp. rate 18, height 1.651 m (5' 5\"), weight 96.5 kg (212 lb 11.9 oz), SpO2 99%.  Intake/Output last 3 Shifts:  I/O last 3 completed shifts:  In: 1760 (18.2 mL/kg) [P.O.:1760]  Out: - (0 mL/kg)   Weight: 96.5 kg     Relevant Results  Results for orders placed or performed during the hospital encounter of 06/17/25 (from the past 24 hours)   Coagulation Screen   Result Value Ref Range    Protime " 22.1 (H) 9.8 - 12.4 seconds    INR 2.0 (H) 0.9 - 1.1    aPTT 34 26 - 36 seconds   CBC and Auto Differential   Result Value Ref Range    WBC 8.8 4.4 - 11.3 x10*3/uL    nRBC 11.1 (H) 0.0 - 0.0 /100 WBCs    RBC 3.39 (L) 4.00 - 5.20 x10*6/uL    Hemoglobin 8.3 (L) 12.0 - 16.0 g/dL    Hematocrit 27.1 (L) 36.0 - 46.0 %    MCV 80 80 - 100 fL    MCH 24.5 (L) 26.0 - 34.0 pg    MCHC 30.6 (L) 32.0 - 36.0 g/dL    RDW 27.8 (H) 11.5 - 14.5 %    Platelets 264 150 - 450 x10*3/uL    Neutrophils % 72.4 40.0 - 80.0 %    Immature Granulocytes %, Automated 0.5 0.0 - 0.9 %    Lymphocytes % 12.5 13.0 - 44.0 %    Monocytes % 9.5 2.0 - 10.0 %    Eosinophils % 4.8 0.0 - 6.0 %    Basophils % 0.3 0.0 - 2.0 %    Neutrophils Absolute 6.40 1.20 - 7.70 x10*3/uL    Immature Granulocytes Absolute, Automated 0.04 0.00 - 0.70 x10*3/uL    Lymphocytes Absolute 1.10 (L) 1.20 - 4.80 x10*3/uL    Monocytes Absolute 0.84 0.10 - 1.00 x10*3/uL    Eosinophils Absolute 0.42 0.00 - 0.70 x10*3/uL    Basophils Absolute 0.03 0.00 - 0.10 x10*3/uL   Comprehensive metabolic panel   Result Value Ref Range    Glucose 91 74 - 99 mg/dL    Sodium 137 136 - 145 mmol/L    Potassium 4.5 3.5 - 5.3 mmol/L    Chloride 101 98 - 107 mmol/L    Bicarbonate 31 21 - 32 mmol/L    Anion Gap 10 10 - 20 mmol/L    Urea Nitrogen 18 6 - 23 mg/dL    Creatinine 0.89 0.50 - 1.05 mg/dL    eGFR 76 >60 mL/min/1.73m*2    Calcium 8.9 8.6 - 10.6 mg/dL    Albumin 3.3 (L) 3.4 - 5.0 g/dL    Alkaline Phosphatase 119 (H) 33 - 110 U/L    Total Protein 6.5 6.4 - 8.2 g/dL    AST 15 9 - 39 U/L    Bilirubin, Total 1.1 0.0 - 1.2 mg/dL    ALT 7 7 - 45 U/L   Lactate dehydrogenase   Result Value Ref Range     84 - 246 U/L   Reticulocytes   Result Value Ref Range    Retic % 4.2 (H) 0.5 - 2.0 %    Retic Absolute 0.143 (H) 0.018 - 0.083 x10*6/uL    Reticulocyte Hemoglobin 24 (L) 28 - 38 pg    Immature Retic fraction 30.5 (H) <=16.0 %     *Note: Due to a large number of results and/or encounters for the requested  time period, some results have not been displayed. A complete set of results can be found in Results Review.       Scheduled medications  Scheduled Medications[1]  Continuous medications  Continuous Medications[2]  PRN medications  PRN Medications[3]    Assessment & Plan  Sickle cell pain crisis (Multi)  Senait Narvaez is a 55 y.o. 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 Orthos negative. CXR (6/17) neg for acute process. Hgb and lysis labs near baseline on admit. Started on IV Dilaudid per care path. Transfusion med consulted for routine inpatient exchange transfusion, s/p femoral apheresis line placement 6/18, s/p RBCex 6/19. R shoulder xray (6/17) negative. Continues on IV dilaudid and Oxycodone for pain management. DC with home resumed noc O2 pending improvement in pain.     Updates 6/22:   - Continue current pain regimen      # Dizziness, resolved  # Syncope, resolved  - Likely 2/2 cardiogenic causes vs dehydration   - Pt reports dizziness (started ~3 weeks ago) with one episode of syncope 3 weeks ago  - EKG on admit (6/17) NSR with R axis deviation with R ventricular hypertrophy  - Last TTE 5/9/25: HFpEF with EF of 67%, severely enlarged RV with decreased function, elevated RVSP  - Troponin on admit (6/17) 4  - Orthostatic vitals (6/18) negative  - BNP (6/18) 118, lactate (6/18) 0.6  - Telemetry ordered  - Will plan to discharge patient with opatch and outpatient cardiology f/u     # Hb SC disease   # Acute on chronic SCD related pain   - Currently on  q6 week RBC exchange transfusions (most recent outpatient 4/18 and inpatient 5/8)  - Carepath reviewed, last updated 4/16/25: IV Dilaudid 1-1.5 mg every 2-3 hours prn for severe pain   - OARRS reviewed, no aberrant behavior noted   - CXR (6/17) neg for acute process  - EKG (6/17) NSR with R axis deviation with R ventricular hypertrophy  - Covid/Flu negative (6/17)  - 6/19: Colonization swab positive for MSSA  - Hgb BL ~ 8, Hgb 9.4 on admission (6/17)--> 8.3 (6/22)) - no indication for simple transfusion  - Tbili BL ~ 1.0, 1.7 on admission (6/17)--> 1.6 (6/18)--> 1.1 (6/22)  - LDH ~150-200,  on admission (6/17)--> 179 (6/18)--> 202 (6/22)  - s/p IV Dilaudid 1mg q3h PRN for severe pain (6/17-6/18)  - s/p IV Dilaudid 1mg Q2 PRN for severe pain (6/18-6/21)  - Started rotating pain regimen (6/21-current) with oxycodone 10mg q4hrs PRN severe pain and IV dilaudid 1mg q4hrs PRN severe pain to rotate for q2hr pain meds if needed  - Continue home Cyclobenzaprine 10mg TID PRN muscle spasms and folic acid 1mg daily  - Bowel regimen for opioid induced constipation with DocuSenna 2tabs BID and Miralax daily; LBM 6/21  - PO Benadryl PRN for opioid-induced pruritus, PO Zofran PRN for opioid-induced nausea  - Utox pending collection (6/17)  - Hgb S (6/17): 28.6% --> 12.5% (6/19)  - Lactate negative 0.6 6/18  - Transfusion med consulted per Dr. Whaley (6/17), s/p routine inpatient exchange 6/19, post Hgb S 12.5%  - s/p femoral apheresis line placement 6/18, plan for removal prior to discharge  - Ionized CA 1.09 (6/17), IV calcium chloride 0.5mg ordered  - Ionized Ca post exchange 1.06, s/p 0.5mg ca gluconate once   - Parvo ordered (6/18) results pending --- retic improved 0 --> 3% post exchange  - R shoulder xray (6/17) negative  - ESR 1.18/CRP 14 (6/17)  - IS encouraged     # HFpEF  # Hx pHTN  # Hx of troponinemia   - Last TTE 5/9/25: HFpEF with EF of 67% right ventricular overloaded, severely enlarged RV with decreased  function, elevated RVSP  - Hx of elevated troponins on admissions, likely leak i/s/o demand  - On lasix 20 every other day at home, held for 6/20 per Dr. Hensley   - Metoprolol dose decreased for hypotension, back to home dose of 25mg BID on 6/20     # Hx of DVT/PE  # Hx of SVC syndrome  - Home warfarin held on admission for apheresis line placement, RESTARTED post fem line placement for exchange (6/18)  - SCDs, encouraged ambulation   - Daily Coags to monitor INR, 6/22 = 2.0  (goal 2-3) -- per attending, will keep warfarin on for now (pt has been previously bridged with lovenox but was restarted back on warfarin d/t prior conversation with pharmacy on 6/18)     # Hx of SVT  - Continue home metoprolol tartrate 25 mg BID --> 6/19 dose adjusted 12.5mg BID --> 6/20 per Dr. Hensley increase back to home dose but keep lasix off for now   - Continue tele, noteable for PVCs     # CKD II  - BL~ 1.1-2; sCr 0.89 (6/22)  - Avoid nephrotoxic meds and IVF     DISPO:  - Full code, confirmed on admit  - DC home with resumed noc O2 pending improvement in pain, likely 6/23  - Sickle Cell FUV 6/23 (requested reschedule), Breast imaging 7/30, RBCEx 7/31, Surg Onc FUV 8/1, Ophthalmology FUV 8/6, Cardiology FUV 8/25    I spent >60 minutes in the professional and overall care of this patient    Assessment and plan as above discussed with attending physician Dr. Luis Angel Hunt, APRN-CNP       [1] ascorbic acid, 500 mg, oral, Daily  atropine, 1 drop, Left Eye, Nightly  cetirizine, 10 mg, oral, Daily  folic acid, 1 mg, oral, Daily  [Held by provider] furosemide, 20 mg, oral, Every other day  heparin flush, 50 Units, intra-catheter, q8h  metoprolol tartrate, 25 mg, oral, BID  polyethylene glycol, 17 g, oral, Daily  sennosides-docusate sodium, 2 tablet, oral, Nightly  warfarin, 5 mg, oral, Daily  white petrolatum, 2 Application, Topical, BID  [2] oxygen, , Last Rate: 2 L/min (06/17/25 9235)  [3] PRN medications:  albuterol, alteplase, benzocaine-menthol, bisacodyl, cyclobenzaprine, diphenhydrAMINE, fluticasone, heparin flush, HYDROmorphone, ondansetron ODT, oxyCODONE, phenoL

## 2025-06-22 NOTE — CARE PLAN
The patient's goals for the shift include  pain control    The clinical goals for the shift include pt will remain VSS this shift    Over the shift, the patient did not make progress toward the following goals. Pt reporting >5/10 pain rating. Pain meds adjusted to rotate oral and IV. Continue regular pain assessments

## 2025-06-23 ENCOUNTER — APPOINTMENT (OUTPATIENT)
Dept: CARDIOLOGY | Facility: HOSPITAL | Age: 55
DRG: 812 | End: 2025-06-23
Payer: COMMERCIAL

## 2025-06-23 ENCOUNTER — PHARMACY VISIT (OUTPATIENT)
Dept: PHARMACY | Facility: CLINIC | Age: 55
End: 2025-06-23
Payer: COMMERCIAL

## 2025-06-23 ENCOUNTER — APPOINTMENT (OUTPATIENT)
Dept: HEMATOLOGY/ONCOLOGY | Facility: HOSPITAL | Age: 55
End: 2025-06-23
Payer: COMMERCIAL

## 2025-06-23 VITALS
DIASTOLIC BLOOD PRESSURE: 72 MMHG | BODY MASS INDEX: 35.45 KG/M2 | HEIGHT: 65 IN | HEART RATE: 72 BPM | RESPIRATION RATE: 20 BRPM | OXYGEN SATURATION: 97 % | TEMPERATURE: 98.8 F | WEIGHT: 212.74 LBS | SYSTOLIC BLOOD PRESSURE: 119 MMHG

## 2025-06-23 PROBLEM — D57.00 SICKLE CELL PAIN CRISIS (MULTI): Status: RESOLVED | Noted: 2025-06-17 | Resolved: 2025-06-23

## 2025-06-23 LAB
ALBUMIN SERPL BCP-MCNC: 3.4 G/DL (ref 3.4–5)
ALP SERPL-CCNC: 122 U/L (ref 33–110)
ALT SERPL W P-5'-P-CCNC: 8 U/L (ref 7–45)
ANION GAP SERPL CALC-SCNC: 9 MMOL/L (ref 10–20)
AST SERPL W P-5'-P-CCNC: 17 U/L (ref 9–39)
BASOPHILS # BLD AUTO: 0.03 X10*3/UL (ref 0–0.1)
BASOPHILS NFR BLD AUTO: 0.4 %
BILIRUB SERPL-MCNC: 1 MG/DL (ref 0–1.2)
BODY SURFACE AREA: 2.1 M2
BUN SERPL-MCNC: 16 MG/DL (ref 6–23)
CALCIUM SERPL-MCNC: 9.1 MG/DL (ref 8.6–10.6)
CHLORIDE SERPL-SCNC: 100 MMOL/L (ref 98–107)
CO2 SERPL-SCNC: 34 MMOL/L (ref 21–32)
CREAT SERPL-MCNC: 0.98 MG/DL (ref 0.5–1.05)
EGFRCR SERPLBLD CKD-EPI 2021: 68 ML/MIN/1.73M*2
EOSINOPHIL # BLD AUTO: 0.36 X10*3/UL (ref 0–0.7)
EOSINOPHIL NFR BLD AUTO: 4.3 %
ERYTHROCYTE [DISTWIDTH] IN BLOOD BY AUTOMATED COUNT: 27.8 % (ref 11.5–14.5)
GLUCOSE SERPL-MCNC: 94 MG/DL (ref 74–99)
HCT VFR BLD AUTO: 27.2 % (ref 36–46)
HGB BLD-MCNC: 8.5 G/DL (ref 12–16)
HGB RETIC QN: 24 PG (ref 28–38)
IMM GRANULOCYTES # BLD AUTO: 0.04 X10*3/UL (ref 0–0.7)
IMM GRANULOCYTES NFR BLD AUTO: 0.5 % (ref 0–0.9)
IMMATURE RETIC FRACTION: 24.5 %
INR PPP: 2.2 (ref 0.9–1.1)
LDH SERPL L TO P-CCNC: 206 U/L (ref 84–246)
LYMPHOCYTES # BLD AUTO: 1.43 X10*3/UL (ref 1.2–4.8)
LYMPHOCYTES NFR BLD AUTO: 17.1 %
MCH RBC QN AUTO: 25 PG (ref 26–34)
MCHC RBC AUTO-ENTMCNC: 31.3 G/DL (ref 32–36)
MCV RBC AUTO: 80 FL (ref 80–100)
MONOCYTES # BLD AUTO: 0.84 X10*3/UL (ref 0.1–1)
MONOCYTES NFR BLD AUTO: 10.1 %
NEUTROPHILS # BLD AUTO: 5.64 X10*3/UL (ref 1.2–7.7)
NEUTROPHILS NFR BLD AUTO: 67.6 %
NRBC BLD-RTO: 9.2 /100 WBCS (ref 0–0)
PLATELET # BLD AUTO: 294 X10*3/UL (ref 150–450)
POTASSIUM SERPL-SCNC: 4.5 MMOL/L (ref 3.5–5.3)
PROT SERPL-MCNC: 6.7 G/DL (ref 6.4–8.2)
PROTHROMBIN TIME: 24.3 SECONDS (ref 9.8–12.4)
RBC # BLD AUTO: 3.4 X10*6/UL (ref 4–5.2)
RETICS #: 0.17 X10*6/UL (ref 0.02–0.08)
RETICS/RBC NFR AUTO: 5 % (ref 0.5–2)
SODIUM SERPL-SCNC: 138 MMOL/L (ref 136–145)
WBC # BLD AUTO: 8.3 X10*3/UL (ref 4.4–11.3)

## 2025-06-23 PROCEDURE — 2500000001 HC RX 250 WO HCPCS SELF ADMINISTERED DRUGS (ALT 637 FOR MEDICARE OP)

## 2025-06-23 PROCEDURE — 2500000001 HC RX 250 WO HCPCS SELF ADMINISTERED DRUGS (ALT 637 FOR MEDICARE OP): Performed by: NURSE PRACTITIONER

## 2025-06-23 PROCEDURE — 99239 HOSP IP/OBS DSCHRG MGMT >30: CPT | Performed by: NURSE PRACTITIONER

## 2025-06-23 PROCEDURE — 80053 COMPREHEN METABOLIC PANEL: CPT

## 2025-06-23 PROCEDURE — 83615 LACTATE (LD) (LDH) ENZYME: CPT

## 2025-06-23 PROCEDURE — 36415 COLL VENOUS BLD VENIPUNCTURE: CPT

## 2025-06-23 PROCEDURE — 85045 AUTOMATED RETICULOCYTE COUNT: CPT

## 2025-06-23 PROCEDURE — RXMED WILLOW AMBULATORY MEDICATION CHARGE

## 2025-06-23 PROCEDURE — 85610 PROTHROMBIN TIME: CPT | Performed by: NURSE PRACTITIONER

## 2025-06-23 PROCEDURE — 2500000004 HC RX 250 GENERAL PHARMACY W/ HCPCS (ALT 636 FOR OP/ED): Mod: JZ,TB | Performed by: NURSE PRACTITIONER

## 2025-06-23 PROCEDURE — 93246 EXT ECG>7D<15D RECORDING: CPT

## 2025-06-23 PROCEDURE — 85025 COMPLETE CBC W/AUTO DIFF WBC: CPT

## 2025-06-23 RX ORDER — OXYCODONE HYDROCHLORIDE 10 MG/1
10 TABLET ORAL EVERY 4 HOURS PRN
Qty: 75 TABLET | Refills: 0 | Status: SHIPPED | OUTPATIENT
Start: 2025-06-23

## 2025-06-23 RX ADMIN — CETIRIZINE HYDROCHLORIDE 10 MG: 10 TABLET, FILM COATED ORAL at 08:01

## 2025-06-23 RX ADMIN — FOLIC ACID 1 MG: 1 TABLET ORAL at 08:01

## 2025-06-23 RX ADMIN — HYDROMORPHONE HYDROCHLORIDE 1 MG: 1 INJECTION, SOLUTION INTRAMUSCULAR; INTRAVENOUS; SUBCUTANEOUS at 07:58

## 2025-06-23 RX ADMIN — OXYCODONE HYDROCHLORIDE 10 MG: 10 TABLET ORAL at 01:51

## 2025-06-23 RX ADMIN — HYDROMORPHONE HYDROCHLORIDE 1 MG: 1 INJECTION, SOLUTION INTRAMUSCULAR; INTRAVENOUS; SUBCUTANEOUS at 03:28

## 2025-06-23 RX ADMIN — METOPROLOL TARTRATE 25 MG: 25 TABLET, FILM COATED ORAL at 08:01

## 2025-06-23 RX ADMIN — OXYCODONE HYDROCHLORIDE AND ACETAMINOPHEN 500 MG: 500 TABLET ORAL at 08:01

## 2025-06-23 ASSESSMENT — PAIN - FUNCTIONAL ASSESSMENT
PAIN_FUNCTIONAL_ASSESSMENT: 0-10

## 2025-06-23 ASSESSMENT — PAIN SCALES - GENERAL
PAINLEVEL_OUTOF10: 9
PAINLEVEL_OUTOF10: 5 - MODERATE PAIN
PAINLEVEL_OUTOF10: 6
PAINLEVEL_OUTOF10: 8
PAINLEVEL_OUTOF10: 8
PAINLEVEL_OUTOF10: 7

## 2025-06-23 ASSESSMENT — PAIN DESCRIPTION - LOCATION: LOCATION: GENERALIZED

## 2025-06-23 NOTE — DISCHARGE SUMMARY
Discharge Diagnosis  Sickle cell pain crisis (Multi)    Test Results Pending At Discharge  Pending Labs       No current pending labs.          Hospital Course  Senait Narvaez is a 55 y.o. 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 Orthos negative. CXR (6/17) neg for acute process. Hgb and lysis labs near baseline. Started on IV Dilaudid per care path. Transfusion med consulted for routine inpatient exchange transfusion, s/p femoral apheresis line placement 6/18, s/p RBCex 6/19. R shoulder xray (6/17) negative. Apheresis line removed 6/23. Pain managed with IV dilaudid and Oxycodone. On 6/23 she reported improved pain and requested dc. Zio patch placed on day of dc. She was dc in stable condition. Rx for Oxycodone sent to [facility] by Dr. Whaley.    Sickle Cell FUV 6/25, Breast imaging 7/30, RBCEx 7/31, Surg Onc FUV 8/1, Ophthalmology FUV 8/6, Cardiology FUV 8/25     Visit Vitals  /72 (BP Location: Left arm, Patient Position: Lying)   Pulse 72   Temp 37.1 °C (98.8 °F) (Temporal)   Resp 20     Vitals:    06/18/25 0900   Weight: 96.5 kg (212 lb 11.9 oz)       Immunization History   Administered Date(s) Administered    COVID-19, mRNA, LNP-S, PF, 30 mcg/0.3 mL dose 12/04/2021    Flu vaccine (IIV4), preservative free *Check age/dose* 10/17/2017, 11/07/2018, 10/07/2019, 09/24/2020, 10/27/2021, 10/22/2022    Flu vaccine, trivalent, preservative free, age 6 months and greater (Fluarix/Fluzone/Flulaval) 01/11/2014, 03/06/2025    Hep A / Hep B  08/11/2016, 12/08/2016    HiB, unspecified 04/07/2015    Influenza, Unspecified 02/22/2001, 10/11/2002, 10/20/2003, 10/18/2006, 11/26/2012, 09/17/2014, 10/22/2022    Influenza, seasonal, injectable 10/11/2002, 10/20/2003, 10/18/2006, 11/26/2012, 09/18/2014, 09/29/2015, 09/27/2016    Meningococcal ACWY-D (Menactra) 4-valent conjugate vaccine 04/07/2015, 07/28/2015    Meningococcal, Unknown Serogroups 07/28/2015    PPD Test 07/01/2003    Pfizer Gray Cap SARS-CoV-2 04/20/2021, 05/11/2021    Pfizer Purple Cap SARS-CoV-2 04/20/2021, 05/11/2021    Pneumococcal Conjugate PCV 7 11/17/2014    Pneumococcal conjugate vaccine, 13-valent (PREVNAR 13) 07/28/2015, 10/17/2017    Pneumococcal polysaccharide vaccine, 23-valent, age 2 years and older (PNEUMOVAX 23) 11/12/2007, 10/01/2008, 04/17/2014    Tdap vaccine, age 7 year and older (BOOSTRIX, ADACEL) 04/24/2018       Results  Results for orders placed or performed during the hospital encounter of 06/17/25 (from the past 24 hours)   CBC and Auto Differential   Result Value Ref Range    WBC 8.3 4.4 - 11.3 x10*3/uL    nRBC 9.2 (H) 0.0 - 0.0 /100 WBCs    RBC 3.40 (L) 4.00 - 5.20 x10*6/uL    Hemoglobin 8.5 (L) 12.0 - 16.0 g/dL    Hematocrit 27.2 (L) 36.0 - 46.0 %    MCV 80 80 - 100 fL    MCH 25.0 (L) 26.0 - 34.0 pg    MCHC 31.3 (L) 32.0 - 36.0 g/dL    RDW 27.8 (H) 11.5 - 14.5 %    Platelets 294 150 - 450 x10*3/uL    Neutrophils % 67.6 40.0 - 80.0 %    Immature Granulocytes %, Automated 0.5 0.0 - 0.9 %    Lymphocytes % 17.1 13.0 - 44.0 %    Monocytes % 10.1 2.0 - 10.0 %    Eosinophils % 4.3 0.0 - 6.0 %    Basophils % 0.4 0.0 - 2.0 %    Neutrophils Absolute 5.64 1.20 - 7.70 x10*3/uL    Immature Granulocytes Absolute, Automated 0.04 0.00 - 0.70 x10*3/uL    Lymphocytes Absolute 1.43 1.20 - 4.80 x10*3/uL    Monocytes Absolute 0.84 0.10 - 1.00 x10*3/uL    Eosinophils Absolute 0.36 0.00 - 0.70 x10*3/uL    Basophils Absolute 0.03 0.00 - 0.10 x10*3/uL   Comprehensive metabolic panel   Result  Value Ref Range    Glucose 94 74 - 99 mg/dL    Sodium 138 136 - 145 mmol/L    Potassium 4.5 3.5 - 5.3 mmol/L    Chloride 100 98 - 107 mmol/L    Bicarbonate 34 (H) 21 - 32 mmol/L    Anion Gap 9 (L) 10 - 20 mmol/L    Urea Nitrogen 16 6 - 23 mg/dL    Creatinine 0.98 0.50 - 1.05 mg/dL    eGFR 68 >60 mL/min/1.73m*2    Calcium 9.1 8.6 - 10.6 mg/dL    Albumin 3.4 3.4 - 5.0 g/dL    Alkaline Phosphatase 122 (H) 33 - 110 U/L    Total Protein 6.7 6.4 - 8.2 g/dL    AST 17 9 - 39 U/L    Bilirubin, Total 1.0 0.0 - 1.2 mg/dL    ALT 8 7 - 45 U/L   Lactate dehydrogenase   Result Value Ref Range     84 - 246 U/L   Reticulocytes   Result Value Ref Range    Retic % 5.0 (H) 0.5 - 2.0 %    Retic Absolute 0.169 (H) 0.018 - 0.083 x10*6/uL    Reticulocyte Hemoglobin 24 (L) 28 - 38 pg    Immature Retic fraction 24.5 (H) <=16.0 %   Protime-INR   Result Value Ref Range    Protime 24.3 (H) 9.8 - 12.4 seconds    INR 2.2 (H) 0.9 - 1.1     *Note: Due to a large number of results and/or encounters for the requested time period, some results have not been displayed. A complete set of results can be found in Results Review.     Scheduled medications  Scheduled Medications[1]  Continuous medications  Continuous Medications[2]  PRN medications  PRN Medications[3]    Pertinent Physical Exam At Time of Discharge  On the day of discharge, the patient reported feeling well and pain was controlled. Vitals and labs were stable.   On exam:  Constitutional: Awake, NAD,  ENMT: mucous membranes moist, no apparent injury, no lesions seen  Head/Neck: NCAT  Respiratory/Thorax: CTAB, no wheezing  Cardiovascular: RRR, S1+S2, no murmur  Gastrointestinal: Soft, NT, nondistended, +BS  Extremities: No LE edema  Psychological: Appropriate mood and behavior  Skin: No rash noted      Home Medications     Medication List      CONTINUE taking these medications     albuterol 90 mcg/actuation inhaler; Inhale 2 puffs every 6 hours if   needed for wheezing.   ascorbic acid  500 mg chewable tablet; Commonly known as: Vitamin C   atropine 1 % ophthalmic solution; Administer 1 drop into the left eye   once daily at bedtime. Please continue to administer to Left eye until   seen at outpt clinic with ophthalmology this upcoming week   cyclobenzaprine 10 mg tablet; Commonly known as: Flexeril; Take 1 tablet   (10 mg) by mouth 3 times a day as needed for muscle spasms.   folic acid 1 mg tablet; Commonly known as: Folvite   furosemide 20 mg tablet; Commonly known as: Lasix; Take 1 tablet (20 mg)   by mouth every other day.   loratadine 10 mg tablet; Commonly known as: Claritin   metoprolol tartrate 25 mg tablet; Commonly known as: Lopressor; Take 1   tablet (25 mg) by mouth 2 times a day.   multivitamin tablet   naloxone 4 mg/0.1 mL nasal spray; Commonly known as: Narcan; Administer   1 spray (4 mg) into affected nostril(s) if needed for opioid reversal. May   repeat every 2-3 minutes if needed, alternating nostrils, until medical   assistance becomes available.   ondansetron 4 mg tablet; Commonly known as: Zofran; Take 1 tablet (4 mg)   by mouth every 8 hours if needed for nausea or vomiting.   oxyCODONE 10 mg immediate release tablet; Commonly known as: Roxicodone;   Take 1 tablet (10 mg) by mouth every 4 hours if needed for severe pain (7   - 10) (pain).   oxygen gas therapy; Commonly known as: O2; Inhale 1 each continuously.   Sore Throat (benzocaine-menth) 15-3.6 mg lozenge; Generic drug:   benzocaine-menthol; Dissolve 1 lozenge in the mouth every 2 hours if   needed for sore throat.   warfarin 5 mg tablet; Commonly known as: Coumadin; Take as directed. If   you are unsure how to take this medication, talk to your nurse or doctor.;   Original instructions: Take 1 tablet (5 mg) by mouth once daily in the   evening.     ASK your doctor about these medications     Chloraseptic Throat Spray 1.4 % aerosol,spray; Generic drug: phenoL; Use   1 spray in the mouth or throat every 2 hours if needed  for sore throat.   white petrolatum 41 % ointment; Commonly known as: Aquaphor; Apply 2   Applications topically 2 times a day. Apply vaseline to all eroded/denuded   areas. Mepilex transfer sheets may be used for areas of friction       Outpatient Follow-Up  Future Appointments   Date Time Provider Department Center   6/25/2025 11:30 AM Erich Whaley MD [id number] [facility]   7/30/2025 10:30 AM [facility]   7/30/2025 11:00 AM [facility] BREAST ULTRAS[facility] 1 [facility]   7/31/2025  9:00 AM [facility] 1 [facility]   7/31/2025 11:00 AM [facility]   8/1/2025 10:00 [facility]   8/6/2025  8:30 AM John Biggs MD [id number] [facility]   8/25/2025 11:20 AM Ankur White DO [address]     >30 minutes was spent on the assessment/discharge plan of this patient    Assessment and plan as above discussed with attending physician Dr. Shala Hunt, APRN-CNP         [1] ascorbic acid, 500 mg, oral, Daily  atropine, 1 drop, Left Eye, Nightly  cetirizine, 10 mg, oral, Daily  folic acid, 1 mg, oral, Daily  [Held by provider] furosemide, 20 mg, oral, Every other day  heparin flush, 50 Units, intra-catheter, q8h  metoprolol tartrate, 25 mg, oral, BID  polyethylene glycol, 17 g, oral, Daily  sennosides-docusate sodium, 2 tablet, oral, BID  warfarin, 5 mg, oral, Daily  white petrolatum, 2 Application, Topical, BID  [2] oxygen, , Last Rate: 2 L/min (06/17/25 0695)  [3] PRN medications: albuterol, alteplase, benzocaine-menthol, bisacodyl, cyclobenzaprine, diphenhydrAMINE, fluticasone, heparin flush, HYDROmorphone, ondansetron ODT, oxyCODONE, phenoL

## 2025-06-23 NOTE — NURSING NOTE
Patient discharged home with no homecare. Meds to beds handed to patient. Fem line removed prior to discharge. Patient left floor on own to go home with mom. Patient alert and oriented at discharge

## 2025-06-23 NOTE — CARE PLAN
The patient's goals for the shift include      The clinical goals for the shift include patient will be discharged home with no problems 6/23/25 1900      Problem: Pain - Adult  Goal: Verbalizes/displays adequate comfort level or baseline comfort level  Outcome: Progressing     Problem: Safety - Adult  Goal: Free from fall injury  Outcome: Progressing     Problem: Fall/Injury  Goal: Not fall by end of shift  Outcome: Progressing  Goal: Be free from injury by end of the shift  Outcome: Progressing  Goal: Verbalize understanding of personal risk factors for fall in the hospital  Outcome: Progressing  Goal: Verbalize understanding of risk factor reduction measures to prevent injury from fall in the home  Outcome: Progressing  Goal: Use assistive devices by end of the shift  Outcome: Progressing  Goal: Pace activities to prevent fatigue by end of the shift  Outcome: Progressing     Problem: Pain  Goal: Takes deep breaths with improved pain control throughout the shift  Outcome: Progressing  Goal: Turns in bed with improved pain control throughout the shift  Outcome: Progressing  Goal: Walks with improved pain control throughout the shift  Outcome: Progressing  Goal: Performs ADL's with improved pain control throughout shift  Outcome: Progressing  Goal: Participates in PT with improved pain control throughout the shift  Outcome: Progressing  Goal: Free from opioid side effects throughout the shift  Outcome: Progressing  Goal: Free from acute confusion related to pain meds throughout the shift  Outcome: Progressing

## 2025-06-25 ENCOUNTER — OFFICE VISIT (OUTPATIENT)
Dept: HEMATOLOGY/ONCOLOGY | Facility: HOSPITAL | Age: 55
End: 2025-06-25
Payer: COMMERCIAL

## 2025-06-25 VITALS
DIASTOLIC BLOOD PRESSURE: 60 MMHG | BODY MASS INDEX: 35.72 KG/M2 | WEIGHT: 214.64 LBS | SYSTOLIC BLOOD PRESSURE: 125 MMHG | HEART RATE: 88 BPM | TEMPERATURE: 97.7 F | RESPIRATION RATE: 18 BRPM | OXYGEN SATURATION: 93 %

## 2025-06-25 DIAGNOSIS — D57.00 SICKLE CELL CRISIS (MULTI): ICD-10-CM

## 2025-06-25 DIAGNOSIS — D57.01 SICKLE CELL CRISIS ACUTE CHEST SYNDROME (MULTI): ICD-10-CM

## 2025-06-25 DIAGNOSIS — T78.40XD ALLERGY, SUBSEQUENT ENCOUNTER: Primary | ICD-10-CM

## 2025-06-25 PROCEDURE — 99214 OFFICE O/P EST MOD 30 MIN: CPT | Performed by: PEDIATRICS

## 2025-06-25 PROCEDURE — G2211 COMPLEX E/M VISIT ADD ON: HCPCS | Performed by: PEDIATRICS

## 2025-06-25 RX ORDER — NALOXONE HYDROCHLORIDE 4 MG/.1ML
1 SPRAY NASAL AS NEEDED
Qty: 2 EACH | Refills: 0 | Status: SHIPPED | OUTPATIENT
Start: 2025-06-25

## 2025-06-25 RX ORDER — LORATADINE 10 MG/1
10 TABLET ORAL DAILY PRN
Qty: 30 TABLET | Refills: 1 | Status: SHIPPED | OUTPATIENT
Start: 2025-06-25 | End: 2025-07-25

## 2025-06-25 ASSESSMENT — PAIN SCALES - GENERAL: PAINLEVEL_OUTOF10: 7

## 2025-06-26 DIAGNOSIS — I82.210 THROMBOSIS OF SUPERIOR VENA CAVA (MULTI): ICD-10-CM

## 2025-06-26 DIAGNOSIS — I82.4Z9 DEEP VEIN THROMBOSIS (DVT) OF DISTAL VEIN OF LOWER EXTREMITY, UNSPECIFIED CHRONICITY, UNSPECIFIED LATERALITY: ICD-10-CM

## 2025-06-26 DIAGNOSIS — I26.99 RECURRENT PULMONARY EMBOLISM (MULTI): Primary | ICD-10-CM

## 2025-06-26 LAB
INR IN PPP BY COAGULATION ASSAY EXTERNAL: 3
PROTHROMBIN TIME (PT) IN PPP BY COAGULATION ASSAY EXTERNAL: NORMAL

## 2025-06-26 NOTE — PROGRESS NOTES
Patient identification verified with 2 identifiers.    Location: [facility] Patient Self-Testing Program [phone]     Referring Physician: Erich Whaley MD (RENEWAL SENT ON 6/26/25)  Enrollment/ Re-enrollment date: 07/01/2025   INR Goal: 2.0-3.0  INR monitoring is per [facility] protocol.  Anticoagulation Medication: warfarin  Indication: Deep Vein Thrombosis (DVT)    Subjective   Bleeding signs/symptoms: No    Bruising: No   Major bleeding event: No  Thrombosis signs/symptoms: No  Thromboembolic event: No  Missed doses: No  Extra doses: No  Medication changes: No  Dietary changes: No  Change in health: No  Change in activity: No  Alcohol: No  Other concerns: No    Upcoming Procedures:  Does the Patient Have any upcoming procedures that require interruption in anticoagulation therapy? no  Does the patient require bridging? no      Anticoagulation Summary  As of 6/18/2025      INR goal:  2.0-3.0   TTR:  81.5% (1.1 y)   INR used for dosing:  3.00 (6/26/2025)   Weekly warfarin total:  35 mg               Assessment/Plan   Therapeutic     1. New dose: Spoke to patient with dosing instructions and next testing date.    2. Next INR: 2 weeks      Education provided to patient during the visit:  Patient instructed to call in interim with questions, concerns and changes.

## 2025-06-30 ASSESSMENT — ENCOUNTER SYMPTOMS
SHORTNESS OF BREATH: 1
FEVER: 0
SCLERAL ICTERUS: 0
BRUISES/BLEEDS EASILY: 0
LEG SWELLING: 0
PALPITATIONS: 0
DEPRESSION: 0
MYALGIAS: 1
HEMATURIA: 0
NAUSEA: 0
DIARRHEA: 0
CONSTIPATION: 1
NERVOUS/ANXIOUS: 0
ABDOMINAL PAIN: 0
BLOOD IN STOOL: 0
HEMOPTYSIS: 0
FLANK PAIN: 0
EXTREMITY WEAKNESS: 0
NUMBNESS: 0
EYE PROBLEMS: 0
ARTHRALGIAS: 1
CHEST TIGHTNESS: 0
VOMITING: 0
HEADACHES: 0
COUGH: 0
WHEEZING: 0
LIGHT-HEADEDNESS: 0
BACK PAIN: 1
WOUND: 0
FATIGUE: 1

## 2025-06-30 NOTE — PROGRESS NOTES
SICKLE CELL OUTPATIENT NOTE  Patient ID: Senait Narvaez   Visit Type: Follow up visit     ASSESSMENT AND PLAN  Senait Narvaez is 55 y.o. 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  Encounter for DMT with chronic full automated red cell exchange transfusion every 6 weeks, indication being multisystem organ failure (renal, liver, respiratory failure, and NSTEMI).  She is not scheduled on 7/31/2025   Recurrent arrhythmia including SVT, managed with valsalva maneuvers.  She is currently wearing a Holter monitor and has an appointment with cardiology coming up   Recurrent VTE/PE with SVC syndrome, on lifelong anticoagulation with warfarin.  Unfortunately patient developed a blood clot while was on Xarelto, and as such a DOAC is not an option at this point.  CAN and nocturnal hypoxia on night time supplemental oxygen.   Chronic constipation, currently well-controlled      PLAN   - No changes in current regimen of exchange transfusions.  Unfortunately because of her difficult IV access we are unable to place a PICC line for manual red blood cell exchange transfusions and as such we will continue with full automated exchange transfusions as scheduled next month on 7/31/2025.  Target combined hemoglobin S and C level will be less than 30%, and posttransfusion hemoglobin of around 9 to 10 g/dL.    - No changes in her current home oral regimen   Oral tylenol 650 mg every 6 hours prn for mild to moderate pain   Oral oxycodone 10-20 mg every 4-6 hours as needed for moderate to severe and breakthrough pain    Non pharmacologic methods of pain control   Flexeril as needed as a muscle relaxant  Reviewed OARRS  - Continue metoprolol 12.5 mg BID, and encourage patient to follow-up with cardiology after she turns in Holter monitor.  - Continue warfarin 5 mg daily with target INR of 2-3.  Unfortunately I would not transition her to a DOAC  - Continue 2L night time  oxygen   - Daily folic acid 1 mg   - Next office visit will be in 6 weeks time prior to next exchange transfusion    Chief Complaint: Follow up for HB SC SCD, chronic pain and management of chronic red cell exchange transfusion      Interval History: Senait is present with her mother in clinic today alone for follow-up of hemoglobin SC sickle cell disease.  She is currently on chronic full automated red blood cell exchange transfusion, and last received this on 6/19/2025, during her admission to hospital for pain crisis on 6/17/2025.  She was discharged from hospital on 6/23/2025, and has otherwise been doing well, except for chronic sickle cell pain which she rates at 6-7/10.  Pain is generalized, but worse in her extremities, as well as large joints.  She remains on chronic opioid therapy and takes her oral oxycodone almost every day.  She had a fall about a few weeks ago after she had felt a bit dizzy and still believes that she has quite a bit of pain from this fall.  She is currently wearing a Holter monitor and continues to experience palpitations as well as symptoms of SVT which has been controlled with vagal maneuvers.    Denies worsening of her vision, focal neurologic deficits, leg ulcers.  Bilateral mild intermittent lower extremity edema still present but denies any in clinic today.  She remains on warfarin and her INR has been within therapeutic range.  She denies increased bruising or bleeding.  Supplemental nighttime oxygen is used almost every night and rarely misses a treatment.      Senait denies chest pain or chest tightness, headaches, dizziness, nausea or vomiting. She is afebrile, stooling well with prn laxatives.  Bronchial asthma, has been well-controlled without recent acute exacerbation.     Review of System:   Review of Systems   Constitutional:  Positive for fatigue. Negative for fever.   HENT:   Negative for nosebleeds.    Eyes:  Negative for eye problems and icterus.   Respiratory:   Positive for shortness of breath. Negative for chest tightness, cough, hemoptysis and wheezing.    Cardiovascular:  Negative for chest pain, leg swelling and palpitations.   Gastrointestinal:  Positive for constipation. Negative for abdominal pain, blood in stool, diarrhea, nausea and vomiting.   Genitourinary:  Negative for hematuria.    Musculoskeletal:  Positive for arthralgias, back pain and myalgias. Negative for flank pain and gait problem.   Skin:  Negative for wound.   Neurological:  Negative for extremity weakness, gait problem, headaches, light-headedness and numbness.   Hematological:  Does not bruise/bleed easily.   Psychiatric/Behavioral:  Negative for depression. The patient is not nervous/anxious.       Allergies:   Allergies   Allergen Reactions    Vancomycin Rash    Oxycontin [Oxycodone] Drowsiness     Current Medications:           Medication Order Taking? Sig Documenting Provider Last Dose Status   albuterol 90 mcg/actuation inhaler [id number] Yes Inhale 2 puffs every 6 hours if needed for wheezing. Erich Whaley MD More than a month Active   ascorbic acid (Vitamin C) 500 mg chewable tablet [id number] Yes Chew 1 tablet (500 mg) once daily. Historical MD Adriana 6/16/2025 Morning Active   atropine 1 % ophthalmic solution [id number] Yes Administer 1 drop into the left eye once daily at bedtime. Please continue to administer to Left eye until seen at outpt clinic with ophthalmology this upcoming week TOMAS De 6/16/2025 Bedtime Active   benzocaine-menthol (Cepastat Sore Throat) lozenge [id number] Yes Dissolve 1 lozenge in the mouth every 2 hours if needed for sore throat. TOMAS De Past Week Active   cyclobenzaprine (Flexeril) 10 mg tablet [id number] No Take 1 tablet (10 mg) by mouth 3 times a day as needed for muscle spasms. Erich Whaley MD 6/14/2025 Active   folic acid (Folvite) 1 mg tablet [id number] Yes Take 1 tablet (1 mg) by mouth once daily. Historical  Provider, MD 6/16/2025 Morning Active   furosemide (Lasix) 20 mg tablet [id number] No Take 1 tablet (20 mg) by mouth every other day. Ankur White DO 6/15/2025 Active   loratadine (Claritin) 10 mg tablet [id number] No Take 1 tablet (10 mg) by mouth once daily as needed for allergies. Historical Provider, MD 6/14/2025 Active   metoprolol tartrate (Lopressor) 25 mg tablet [id number] Yes Take 1 tablet (25 mg) by mouth 2 times a day. Ankur White DO 6/16/2025 Evening Active   multivitamin tablet [id number] Yes Take 1 tablet by mouth once daily. Historical Provider, MD 6/16/2025 Active   naloxone (Narcan) 4 mg/0.1 mL nasal spray [id number] No Administer 1 spray (4 mg) into affected nostril(s) if needed for opioid reversal. May repeat every 2-3 minutes if needed, alternating nostrils, until medical assistance becomes available.   Patient not taking: Reported on 6/17/2025    Erich Whaley MD Not Taking Active   ondansetron (Zofran) 4 mg tablet [id number] Yes Take 1 tablet (4 mg) by mouth every 8 hours if needed for nausea or vomiting. GRISEL Mendez-CNP Past Month Active   oxyCODONE (Roxicodone) 10 mg immediate release tablet [id number] Yes Take 1 tablet (10 mg) by mouth every 4 hours if needed for severe pain (7 - 10) (pain). TOMAS Mendez 6/15/2025 Active   oxygen (O2) gas therapy [id number] No Inhale 1 each continuously. TOMAS Contreras Unknown Active   phenoL (Chloraseptic) 1.4 % aerosol,spray [id number] No Use 1 spray in the mouth or throat every 2 hours if needed for sore throat.   Patient not taking: Reported on 6/17/2025    TOMAS De Not Taking Active   warfarin (Coumadin) 5 mg tablet [id number] Yes Take 1 tablet (5 mg) by mouth once daily in the evening. Erich Whaley MD 6/16/2025 Active   white petrolatum (Aquaphor) 41 % ointment ointment [id number] No Apply 2 Applications topically 2 times a day. Apply vaseline to all eroded/denuded areas. Mepilex  transfer sheets may be used for areas of friction   Patient not taking: Reported on 6/17/2025    Sonia Kerns, GRISEL-CNP Not Taking Active                Past medical and Surgical History:   Hemoglobin SC disease with recurrent multiorgan failure (pulmonary infiltrate, hepatopathy, Acute Kidney Injury) on chronic red cell exchange transfusion   Recurrent DVT/PE with lifelong anticoagulation on coumadin  Cauda equina with saddle numbness, history of bowel/bladder incontinence  Chronic right hip pain, secondary to AVN    History of acute chest syndrome.   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   Obstructive Sleep and significant nocturnal hemoglobin desaturation    Bilateral lower extremity edema, recurrent  SVC syndrome S/P balloon angioplasty of SVC/left brachiocephalic vein, removal of left sided Mediport catheter (1/21/20)  Syncopal episodes   Balloon removed via IR on April 2023.   SVT event with conversion with 1 dose 6mg of adenosine on 5/5/23  Admission June 2023 underwent RHC/LHC on 6/16 with mPA pressure 36, wedge 14, CI 4.2 and her coronary angiogram revealed no angiographic evidence of obstructive CAD. Dx of pulm HTN.     Family History:  Family History   Problem Relation    Diabetes Father    Gout Father    Sickle cell trait Father    Seizures Sister    Diabetes Other    Uterine cancer Other    Other (congenital blindness) Cousin     Social History:   Senait Narvaez  reports that she quit smoking about 11 years ago. Her smoking use included cigarettes. She has been exposed to tobacco smoke. She has never used smokeless tobacco.  reports that she does not currently use drugs.    EXAMINATION FINDINGS   /60 (BP Location: Left arm, Patient Position: Sitting, BP Cuff Size: Large adult)   Pulse 88   Temp 36.5 °C (97.7 °F) (Temporal)   Resp 18   Wt 97.4 kg (214 lb 10.2 oz)   SpO2 93%   BMI 35.72 kg/m²   2.11 meters squared    Physical Exam  Vitals and nursing note  reviewed.   Constitutional:       General: She is not in acute distress.     Appearance: She is not toxic-appearing.   Neck:      Vascular: No carotid bruit.      Comments: SVC syndrome with distension of her neck veins  Cardiovascular:      Rate and Rhythm: Normal rate and regular rhythm.      Pulses: Normal pulses.      Heart sounds: No murmur heard.     No gallop.   Pulmonary:      Effort: Pulmonary effort is normal. No respiratory distress.      Breath sounds: Normal breath sounds. No wheezing.   Abdominal:      General: Bowel sounds are normal.      Palpations: Abdomen is soft.      Tenderness: There is no abdominal tenderness. There is no guarding.   Musculoskeletal:         General: No swelling or deformity.      Cervical back: No rigidity or tenderness.      Right lower leg: No edema.      Left lower leg: No edema.   Lymphadenopathy:      Cervical: No cervical adenopathy.   Skin:     General: Skin is warm.      Capillary Refill: Capillary refill takes less than 2 seconds.      Findings: No lesion or rash.   Neurological:      General: No focal deficit present.      Mental Status: She is alert and oriented to person, place, and time. Mental status is at baseline.      Cranial Nerves: No cranial nerve deficit.      Motor: No weakness.      Gait: Gait normal.   Psychiatric:         Mood and Affect: Mood normal.         Behavior: Behavior normal.       LABS     Latest Reference Range & Units 06/23/25 03:30   WBC 4.4 - 11.3 x10*3/uL 8.3   nRBC 0.0 - 0.0 /100 WBCs 9.2 (H)   RBC 4.00 - 5.20 x10*6/uL 3.40 (L)   HEMOGLOBIN 12.0 - 16.0 g/dL 8.5 (L)   HEMATOCRIT 36.0 - 46.0 % 27.2 (L)   MCV 80 - 100 fL 80   MCH 26.0 - 34.0 pg 25.0 (L)   MCHC 32.0 - 36.0 g/dL 31.3 (L)   RED CELL DISTRIBUTION WIDTH 11.5 - 14.5 % 27.8 (H)   Platelets 150 - 450 x10*3/uL 294   Neutrophils % 40.0 - 80.0 % 67.6   Immature Granulocytes %, Automated 0.0 - 0.9 % 0.5   Lymphocytes % 13.0 - 44.0 % 17.1   Monocytes % 2.0 - 10.0 % 10.1   Eosinophils  % 0.0 - 6.0 % 4.3   Basophils % 0.0 - 2.0 % 0.4   Neutrophils Absolute 1.20 - 7.70 x10*3/uL 5.64   Immature Granulocytes Absolute, Automated 0.00 - 0.70 x10*3/uL 0.04   Lymphocytes Absolute 1.20 - 4.80 x10*3/uL 1.43   Monocytes Absolute 0.10 - 1.00 x10*3/uL 0.84   Eosinophils Absolute 0.00 - 0.70 x10*3/uL 0.36   Basophils Absolute 0.00 - 0.10 x10*3/uL 0.03   Retic % 0.5 - 2.0 % 5.0 (H)   Retic Absolute 0.018 - 0.083 x10*6/uL 0.169 (H)   Reticulocyte Hemoglobin 28 - 38 pg 24 (L)   Immature Retic fraction <=16.0 % 24.5 (H)        [facility] Reference Range & Units 06/23/25 03:30   GLUCOSE 74 - 99 mg/dL 94   SODIUM 136 - 145 mmol/L 138   POTASSIUM 3.5 - 5.3 mmol/L 4.5   CHLORIDE 98 - 107 mmol/L 100   Bicarbonate 21 - 32 mmol/L 34 (H)   Anion Gap 10 - 20 mmol/L 9 (L)   Blood Urea Nitrogen 6 - 23 mg/dL 16   Creatinine 0.50 - 1.05 mg/dL 0.98   EGFR >60 mL/min/1.73m*2 68   Calcium 8.6 - 10.6 mg/dL 9.1   Albumin 3.4 - 5.0 g/dL 3.4   Alkaline Phosphatase 33 - 110 U/L 122 (H)   ALT 7 - 45 U/L 8   AST 9 - 39 U/L 17   Bilirubin Total 0.0 - 1.2 mg/dL 1.0   Total Protein 6.4 - 8.2 g/dL 6.7   LDH 84 - 246 U/L 206       Erich Whaley MD

## 2025-07-01 ENCOUNTER — TELEPHONE (OUTPATIENT)
Dept: ADMISSION | Facility: HOSPITAL | Age: 55
End: 2025-07-01
Payer: COMMERCIAL

## 2025-07-01 NOTE — TELEPHONE ENCOUNTER
Senait Narvaez called the refill line for Oxycodone 10mg. Requesting refills be sent to [facility] pharmacy; message sent to Sickle Cell team to submit.

## 2025-07-02 DIAGNOSIS — D57.00 SICKLE CELL CRISIS (MULTI): ICD-10-CM

## 2025-07-02 RX ORDER — OXYCODONE HYDROCHLORIDE 10 MG/1
10 TABLET ORAL EVERY 4 HOURS PRN
Qty: 75 TABLET | Refills: 0 | Status: SHIPPED | OUTPATIENT
Start: 2025-07-06

## 2025-07-10 ENCOUNTER — APPOINTMENT (OUTPATIENT)
Dept: HEMATOLOGY/ONCOLOGY | Facility: HOSPITAL | Age: 55
End: 2025-07-10
Payer: COMMERCIAL

## 2025-07-10 ENCOUNTER — ANTICOAGULATION - WARFARIN VISIT (OUTPATIENT)
Dept: CARDIOLOGY | Facility: CLINIC | Age: 55
End: 2025-07-10
Payer: COMMERCIAL

## 2025-07-10 DIAGNOSIS — I26.99 RECURRENT PULMONARY EMBOLISM (MULTI): Primary | ICD-10-CM

## 2025-07-10 DIAGNOSIS — I82.4Z9 DEEP VEIN THROMBOSIS (DVT) OF DISTAL VEIN OF LOWER EXTREMITY, UNSPECIFIED CHRONICITY, UNSPECIFIED LATERALITY: ICD-10-CM

## 2025-07-10 DIAGNOSIS — I82.210 THROMBOSIS OF SUPERIOR VENA CAVA (MULTI): ICD-10-CM

## 2025-07-10 NOTE — PROGRESS NOTES
Patient identification verified with 2 identifiers.    Location: [facility] Patient Self-Testing Program [phone]     Referring Physician: Erich Whaley MD   Enrollment/ Re-enrollment date: 2026  INR Goal: 2.0-3.0  INR monitoring is per [facility] protocol.  Anticoagulation Medication: warfarin  Indication: Deep Vein Thrombosis (DVT)    Subjective   Bleeding signs/symptoms: No    Bruising: No   Major bleeding event: No  Thrombosis signs/symptoms: No  Thromboembolic event: No  Missed doses: No  Extra doses: No  Medication changes: No  Dietary changes: No  Change in health: No  Change in activity: No  Alcohol: No  Other concerns: No    Upcoming Procedures:  Does the Patient Have any upcoming procedures that require interruption in anticoagulation therapy? no  Does the patient require bridging? no      Anticoagulation Summary  As of 7/10/2025      INR goal:  2.0-3.0   TTR:  82.1% (1.2 y)   INR used for dosin.20 (7/10/2025)   Weekly warfarin total:  35 mg               Assessment/Plan   Therapeutic     1. New dose: Spoke to patient with dosing instructions and next testing date.    2. Next INR: 2 weeks      Education provided to patient during the visit:  Patient instructed to call in interim with questions, concerns and changes.

## 2025-07-11 ENCOUNTER — APPOINTMENT (OUTPATIENT)
Dept: RADIOLOGY | Facility: HOSPITAL | Age: 55
End: 2025-07-11
Payer: COMMERCIAL

## 2025-07-11 NOTE — TELEPHONE ENCOUNTER
I spoke with patieints mother Tati Salgado who stated Senait experienced a sickle cell crisis and became unable to walk.   EMS was called, patients taken to ED at [facility].   She is being admitted to the ICU.  Mother states she was told her daughter had an abnormal EKG and low potassium level.   She is requesting to speak directly to Dr. Whaley.   Message forwarded to him.  
[Negative] : Genitourinary
[Negative] : Genitourinary

## 2025-07-21 ENCOUNTER — TELEPHONE (OUTPATIENT)
Dept: ADMISSION | Facility: HOSPITAL | Age: 55
End: 2025-07-21
Payer: COMMERCIAL

## 2025-07-21 DIAGNOSIS — D57.00 SICKLE CELL CRISIS (MULTI): ICD-10-CM

## 2025-07-21 RX ORDER — OXYCODONE HYDROCHLORIDE 10 MG/1
10 TABLET ORAL EVERY 4 HOURS PRN
Qty: 75 TABLET | Refills: 0 | Status: ON HOLD | OUTPATIENT
Start: 2025-07-21 | End: 2025-08-02

## 2025-07-24 ENCOUNTER — ANTICOAGULATION - WARFARIN VISIT (OUTPATIENT)
Dept: CARDIOLOGY | Facility: CLINIC | Age: 55
End: 2025-07-24
Payer: COMMERCIAL

## 2025-07-24 DIAGNOSIS — I82.210 THROMBOSIS OF SUPERIOR VENA CAVA (MULTI): ICD-10-CM

## 2025-07-24 DIAGNOSIS — I82.4Z9 DEEP VEIN THROMBOSIS (DVT) OF DISTAL VEIN OF LOWER EXTREMITY, UNSPECIFIED CHRONICITY, UNSPECIFIED LATERALITY: ICD-10-CM

## 2025-07-24 DIAGNOSIS — I26.99 RECURRENT PULMONARY EMBOLISM (MULTI): Primary | ICD-10-CM

## 2025-07-28 ENCOUNTER — APPOINTMENT (OUTPATIENT)
Dept: RADIOLOGY | Facility: HOSPITAL | Age: 55
DRG: 811 | End: 2025-07-28
Payer: COMMERCIAL

## 2025-07-28 ENCOUNTER — CLINICAL SUPPORT (OUTPATIENT)
Dept: EMERGENCY MEDICINE | Facility: HOSPITAL | Age: 55
DRG: 811 | End: 2025-07-28
Payer: COMMERCIAL

## 2025-07-28 ENCOUNTER — TELEPHONE (OUTPATIENT)
Dept: HEMATOLOGY/ONCOLOGY | Facility: HOSPITAL | Age: 55
End: 2025-07-28
Payer: COMMERCIAL

## 2025-07-28 ENCOUNTER — LAB (OUTPATIENT)
Dept: LAB | Facility: HOSPITAL | Age: 55
DRG: 811 | End: 2025-07-28
Payer: COMMERCIAL

## 2025-07-28 ENCOUNTER — OFFICE VISIT (OUTPATIENT)
Dept: HEMATOLOGY/ONCOLOGY | Facility: HOSPITAL | Age: 55
DRG: 811 | End: 2025-07-28
Payer: COMMERCIAL

## 2025-07-28 ENCOUNTER — HOSPITAL ENCOUNTER (INPATIENT)
Facility: HOSPITAL | Age: 55
Discharge: HOME | DRG: 811 | End: 2025-07-28
Attending: EMERGENCY MEDICINE
Payer: COMMERCIAL

## 2025-07-28 VITALS
OXYGEN SATURATION: 83 % | HEART RATE: 93 BPM | TEMPERATURE: 96.8 F | BODY MASS INDEX: 36.39 KG/M2 | DIASTOLIC BLOOD PRESSURE: 57 MMHG | RESPIRATION RATE: 14 BRPM | WEIGHT: 218.7 LBS | SYSTOLIC BLOOD PRESSURE: 120 MMHG

## 2025-07-28 DIAGNOSIS — H33.21 RETINAL DETACHMENT, RIGHT: ICD-10-CM

## 2025-07-28 DIAGNOSIS — D57.00 SICKLE CELL PAIN CRISIS (MULTI): ICD-10-CM

## 2025-07-28 DIAGNOSIS — D57.01 SICKLE CELL CRISIS ACUTE CHEST SYNDROME (MULTI): ICD-10-CM

## 2025-07-28 DIAGNOSIS — Z92.89 HISTORY OF EXCHANGE TRANSFUSION: ICD-10-CM

## 2025-07-28 DIAGNOSIS — D57.00 SICKLE CELL ANEMIA WITH PAIN: Primary | ICD-10-CM

## 2025-07-28 DIAGNOSIS — I47.10 SVT (SUPRAVENTRICULAR TACHYCARDIA): Primary | ICD-10-CM

## 2025-07-28 DIAGNOSIS — M79.89 SWELLING OF RIGHT LOWER EXTREMITY: ICD-10-CM

## 2025-07-28 LAB
ABO GROUP (TYPE) IN BLOOD: NORMAL
ALBUMIN SERPL BCP-MCNC: 3.5 G/DL (ref 3.4–5)
ALP SERPL-CCNC: 171 U/L (ref 33–110)
ALT SERPL W P-5'-P-CCNC: 12 U/L (ref 7–45)
ANION GAP SERPL CALC-SCNC: 10 MMOL/L (ref 10–20)
ANTIBODY SCREEN: NORMAL
AST SERPL W P-5'-P-CCNC: 17 U/L (ref 9–39)
ATRIAL RATE: 77 BPM
BASOPHILS # BLD AUTO: 0.03 X10*3/UL (ref 0–0.1)
BASOPHILS NFR BLD AUTO: 0.3 %
BILIRUB SERPL-MCNC: 4.4 MG/DL (ref 0–1.2)
BUN SERPL-MCNC: 20 MG/DL (ref 6–23)
CA-I BLD-SCNC: 1.09 MMOL/L (ref 1.1–1.33)
CALCIUM SERPL-MCNC: 8.6 MG/DL (ref 8.6–10.3)
CHLORIDE SERPL-SCNC: 100 MMOL/L (ref 98–107)
CO2 SERPL-SCNC: 30 MMOL/L (ref 21–32)
CREAT SERPL-MCNC: 1.51 MG/DL (ref 0.5–1.05)
DACRYOCYTES BLD QL SMEAR: NORMAL
EGFRCR SERPLBLD CKD-EPI 2021: 40 ML/MIN/1.73M*2
EOSINOPHIL # BLD AUTO: 0.11 X10*3/UL (ref 0–0.7)
EOSINOPHIL NFR BLD AUTO: 1 %
ERYTHROCYTE [DISTWIDTH] IN BLOOD BY AUTOMATED COUNT: 30.3 % (ref 11.5–14.5)
GLUCOSE SERPL-MCNC: 88 MG/DL (ref 74–99)
HCT VFR BLD AUTO: 29.3 % (ref 36–46)
HGB BLD-MCNC: 9.5 G/DL (ref 12–16)
HGB RETIC QN: 23 PG (ref 28–38)
HGB RETIC QN: 26 PG (ref 28–38)
HOWELL-JOLLY BOD BLD QL SMEAR: PRESENT
HYPOCHROMIA BLD QL SMEAR: NORMAL
IMM GRANULOCYTES # BLD AUTO: 0.05 X10*3/UL (ref 0–0.7)
IMM GRANULOCYTES NFR BLD AUTO: 0.5 % (ref 0–0.9)
IMMATURE RETIC FRACTION: 22.4 %
IMMATURE RETIC FRACTION: 24.3 %
LDH SERPL L TO P-CCNC: 219 U/L (ref 84–246)
LYMPHOCYTES # BLD AUTO: 1.76 X10*3/UL (ref 1.2–4.8)
LYMPHOCYTES NFR BLD AUTO: 16.3 %
MCH RBC QN AUTO: 23.9 PG (ref 26–34)
MCHC RBC AUTO-ENTMCNC: 32.4 G/DL (ref 32–36)
MCV RBC AUTO: 74 FL (ref 80–100)
MONOCYTES # BLD AUTO: 0.62 X10*3/UL (ref 0.1–1)
MONOCYTES NFR BLD AUTO: 5.7 %
NEUTROPHILS # BLD AUTO: 8.24 X10*3/UL (ref 1.2–7.7)
NEUTROPHILS NFR BLD AUTO: 76.2 %
NRBC BLD-RTO: 50.4 /100 WBCS (ref 0–0)
P AXIS: 79 DEGREES
P OFFSET: 170 MS
P ONSET: 137 MS
PAPPENHEIMER BOD BLD QL SMEAR: PRESENT
PLATELET # BLD AUTO: 264 X10*3/UL (ref 150–450)
POLYCHROMASIA BLD QL SMEAR: NORMAL
POTASSIUM SERPL-SCNC: 4.2 MMOL/L (ref 3.5–5.3)
PR INTERVAL: 150 MS
PROT SERPL-MCNC: 7.1 G/DL (ref 6.4–8.2)
Q ONSET: 212 MS
QRS COUNT: 13 BEATS
QRS DURATION: 78 MS
QT INTERVAL: 400 MS
QTC CALCULATION(BAZETT): 452 MS
QTC FREDERICIA: 434 MS
R AXIS: 138 DEGREES
RBC # BLD AUTO: 3.98 X10*6/UL (ref 4–5.2)
RBC MORPH BLD: NORMAL
RETICS #: 0.34 X10*6/UL (ref 0.02–0.08)
RETICS #: 0.38 X10*6/UL (ref 0.02–0.08)
RETICS/RBC NFR AUTO: 8.6 % (ref 0.5–2)
RETICS/RBC NFR AUTO: 9.3 % (ref 0.5–2)
RH FACTOR (ANTIGEN D): NORMAL
SICKLE CELLS BLD QL SMEAR: NORMAL
SODIUM SERPL-SCNC: 136 MMOL/L (ref 136–145)
T AXIS: 113 DEGREES
T OFFSET: 412 MS
TARGETS BLD QL SMEAR: NORMAL
VENTRICULAR RATE: 77 BPM
WBC # BLD AUTO: 10.8 X10*3/UL (ref 4.4–11.3)

## 2025-07-28 PROCEDURE — 93005 ELECTROCARDIOGRAM TRACING: CPT

## 2025-07-28 PROCEDURE — 2500000004 HC RX 250 GENERAL PHARMACY W/ HCPCS (ALT 636 FOR OP/ED): Mod: JZ,TB

## 2025-07-28 PROCEDURE — 85025 COMPLETE CBC W/AUTO DIFF WBC: CPT

## 2025-07-28 PROCEDURE — 96376 TX/PRO/DX INJ SAME DRUG ADON: CPT

## 2025-07-28 PROCEDURE — 83615 LACTATE (LD) (LDH) ENZYME: CPT

## 2025-07-28 PROCEDURE — 2500000001 HC RX 250 WO HCPCS SELF ADMINISTERED DRUGS (ALT 637 FOR MEDICARE OP): Performed by: COUNSELOR

## 2025-07-28 PROCEDURE — 99223 1ST HOSP IP/OBS HIGH 75: CPT

## 2025-07-28 PROCEDURE — 71046 X-RAY EXAM CHEST 2 VIEWS: CPT

## 2025-07-28 PROCEDURE — 96375 TX/PRO/DX INJ NEW DRUG ADDON: CPT

## 2025-07-28 PROCEDURE — 86922 COMPATIBILITY TEST ANTIGLOB: CPT

## 2025-07-28 PROCEDURE — 80053 COMPREHEN METABOLIC PANEL: CPT

## 2025-07-28 PROCEDURE — 70450 CT HEAD/BRAIN W/O DYE: CPT | Performed by: RADIOLOGY

## 2025-07-28 PROCEDURE — 82330 ASSAY OF CALCIUM: CPT

## 2025-07-28 PROCEDURE — 86850 RBC ANTIBODY SCREEN: CPT

## 2025-07-28 PROCEDURE — 85045 AUTOMATED RETICULOCYTE COUNT: CPT | Performed by: EMERGENCY MEDICINE

## 2025-07-28 PROCEDURE — 99285 EMERGENCY DEPT VISIT HI MDM: CPT | Mod: 25 | Performed by: EMERGENCY MEDICINE

## 2025-07-28 PROCEDURE — 71046 X-RAY EXAM CHEST 2 VIEWS: CPT | Performed by: RADIOLOGY

## 2025-07-28 PROCEDURE — 99214 OFFICE O/P EST MOD 30 MIN: CPT | Performed by: PEDIATRICS

## 2025-07-28 PROCEDURE — 85045 AUTOMATED RETICULOCYTE COUNT: CPT

## 2025-07-28 PROCEDURE — 96374 THER/PROPH/DIAG INJ IV PUSH: CPT

## 2025-07-28 PROCEDURE — 93010 ELECTROCARDIOGRAM REPORT: CPT | Performed by: INTERNAL MEDICINE

## 2025-07-28 PROCEDURE — 83021 HEMOGLOBIN CHROMOTOGRAPHY: CPT

## 2025-07-28 PROCEDURE — 36415 COLL VENOUS BLD VENIPUNCTURE: CPT

## 2025-07-28 PROCEDURE — 1200000003 HC ONCOLOGY  ROOM WITH TELEMETRY DAILY

## 2025-07-28 PROCEDURE — 70450 CT HEAD/BRAIN W/O DYE: CPT

## 2025-07-28 PROCEDURE — 2500000004 HC RX 250 GENERAL PHARMACY W/ HCPCS (ALT 636 FOR OP/ED)

## 2025-07-28 PROCEDURE — G2211 COMPLEX E/M VISIT ADD ON: HCPCS | Performed by: PEDIATRICS

## 2025-07-28 RX ORDER — CETIRIZINE HYDROCHLORIDE 1 MG/ML
10 SOLUTION ORAL DAILY
Status: DISCONTINUED | OUTPATIENT
Start: 2025-07-29 | End: 2025-07-28 | Stop reason: CLARIF

## 2025-07-28 RX ORDER — ONDANSETRON HYDROCHLORIDE 2 MG/ML
4 INJECTION, SOLUTION INTRAVENOUS ONCE
Status: COMPLETED | OUTPATIENT
Start: 2025-07-28 | End: 2025-07-28

## 2025-07-28 RX ORDER — FUROSEMIDE 20 MG/1
20 TABLET ORAL EVERY OTHER DAY
Status: DISCONTINUED | OUTPATIENT
Start: 2025-07-29 | End: 2025-08-05 | Stop reason: HOSPADM

## 2025-07-28 RX ORDER — DIPHENHYDRAMINE HCL 25 MG
25 CAPSULE ORAL EVERY 6 HOURS PRN
Status: DISCONTINUED | OUTPATIENT
Start: 2025-07-29 | End: 2025-08-05 | Stop reason: HOSPADM

## 2025-07-28 RX ORDER — FOLIC ACID 1 MG/1
1 TABLET ORAL DAILY
Status: DISCONTINUED | OUTPATIENT
Start: 2025-07-29 | End: 2025-08-05 | Stop reason: HOSPADM

## 2025-07-28 RX ORDER — FUROSEMIDE 10 MG/ML
20 INJECTION INTRAMUSCULAR; INTRAVENOUS ONCE
Status: COMPLETED | OUTPATIENT
Start: 2025-07-28 | End: 2025-07-28

## 2025-07-28 RX ORDER — CETIRIZINE HYDROCHLORIDE 10 MG/1
10 TABLET ORAL DAILY
Status: DISCONTINUED | OUTPATIENT
Start: 2025-07-29 | End: 2025-08-05 | Stop reason: HOSPADM

## 2025-07-28 RX ORDER — AMOXICILLIN 250 MG
2 CAPSULE ORAL 2 TIMES DAILY PRN
Status: DISCONTINUED | OUTPATIENT
Start: 2025-07-28 | End: 2025-07-29

## 2025-07-28 RX ORDER — METOPROLOL TARTRATE 25 MG/1
25 TABLET, FILM COATED ORAL 2 TIMES DAILY
Status: DISCONTINUED | OUTPATIENT
Start: 2025-07-29 | End: 2025-08-05 | Stop reason: HOSPADM

## 2025-07-28 RX ORDER — PETROLATUM 420 MG/G
2 OINTMENT TOPICAL 2 TIMES DAILY
Status: DISCONTINUED | OUTPATIENT
Start: 2025-07-29 | End: 2025-08-05 | Stop reason: HOSPADM

## 2025-07-28 RX ORDER — DIPHENHYDRAMINE HCL 25 MG
25 CAPSULE ORAL ONCE
Status: COMPLETED | OUTPATIENT
Start: 2025-07-28 | End: 2025-07-28

## 2025-07-28 RX ORDER — PANTOPRAZOLE SODIUM 40 MG/1
40 TABLET, DELAYED RELEASE ORAL
Status: DISCONTINUED | OUTPATIENT
Start: 2025-07-29 | End: 2025-08-05 | Stop reason: HOSPADM

## 2025-07-28 RX ORDER — ALBUTEROL SULFATE 90 UG/1
2 INHALANT RESPIRATORY (INHALATION) EVERY 6 HOURS PRN
Status: DISCONTINUED | OUTPATIENT
Start: 2025-07-28 | End: 2025-08-05 | Stop reason: HOSPADM

## 2025-07-28 RX ORDER — CYCLOBENZAPRINE HCL 10 MG
10 TABLET ORAL 3 TIMES DAILY PRN
Status: DISCONTINUED | OUTPATIENT
Start: 2025-07-28 | End: 2025-08-05 | Stop reason: HOSPADM

## 2025-07-28 RX ORDER — WARFARIN SODIUM 5 MG/1
5 TABLET ORAL DAILY
Status: DISCONTINUED | OUTPATIENT
Start: 2025-07-28 | End: 2025-08-05 | Stop reason: HOSPADM

## 2025-07-28 RX ORDER — ONDANSETRON 8 MG/1
8 TABLET, FILM COATED ORAL EVERY 8 HOURS PRN
Status: DISCONTINUED | OUTPATIENT
Start: 2025-07-28 | End: 2025-08-05 | Stop reason: HOSPADM

## 2025-07-28 RX ORDER — POLYETHYLENE GLYCOL 3350 17 G/17G
17 POWDER, FOR SOLUTION ORAL 2 TIMES DAILY PRN
Status: DISCONTINUED | OUTPATIENT
Start: 2025-07-28 | End: 2025-07-29

## 2025-07-28 RX ADMIN — HYDROMORPHONE HYDROCHLORIDE 2 MG: 2 INJECTION, SOLUTION INTRAMUSCULAR; INTRAVENOUS; SUBCUTANEOUS at 16:36

## 2025-07-28 RX ADMIN — HYDROMORPHONE HYDROCHLORIDE 2 MG: 2 INJECTION, SOLUTION INTRAMUSCULAR; INTRAVENOUS; SUBCUTANEOUS at 23:18

## 2025-07-28 RX ADMIN — HYDROMORPHONE HYDROCHLORIDE 2 MG: 2 INJECTION, SOLUTION INTRAMUSCULAR; INTRAVENOUS; SUBCUTANEOUS at 17:56

## 2025-07-28 RX ADMIN — HYDROMORPHONE HYDROCHLORIDE 2 MG: 2 INJECTION, SOLUTION INTRAMUSCULAR; INTRAVENOUS; SUBCUTANEOUS at 19:43

## 2025-07-28 RX ADMIN — ONDANSETRON 4 MG: 2 INJECTION INTRAMUSCULAR; INTRAVENOUS at 16:36

## 2025-07-28 RX ADMIN — DIPHENHYDRAMINE HYDROCHLORIDE 25 MG: 25 CAPSULE ORAL at 19:43

## 2025-07-28 RX ADMIN — FUROSEMIDE 20 MG: 10 INJECTION, SOLUTION INTRAMUSCULAR; INTRAVENOUS at 17:55

## 2025-07-28 ASSESSMENT — ENCOUNTER SYMPTOMS
VOMITING: 0
APPETITE CHANGE: 0
DIZZINESS: 0
COUGH: 0
CONFUSION: 0
APNEA: 0
SHORTNESS OF BREATH: 0
EYES NEGATIVE: 1
DIFFICULTY URINATING: 0
RHINORRHEA: 0
BRUISES/BLEEDS EASILY: 0
CHEST TIGHTNESS: 0
ACTIVITY CHANGE: 0
ABDOMINAL PAIN: 0
ADENOPATHY: 0
ABDOMINAL DISTENTION: 0
AGITATION: 0
PALPITATIONS: 0

## 2025-07-28 ASSESSMENT — PAIN - FUNCTIONAL ASSESSMENT
PAIN_FUNCTIONAL_ASSESSMENT: 0-10

## 2025-07-28 ASSESSMENT — PAIN DESCRIPTION - LOCATION
LOCATION: GENERALIZED
LOCATION: OTHER (COMMENT)
LOCATION: GENERALIZED
LOCATION: GENERALIZED

## 2025-07-28 ASSESSMENT — PAIN SCALES - GENERAL
PAINLEVEL_OUTOF10: 9
PAINLEVEL_OUTOF10: 10 - WORST POSSIBLE PAIN
PAINLEVEL_OUTOF10: 10-WORST PAIN EVER
PAINLEVEL_OUTOF10: 10 - WORST POSSIBLE PAIN
PAINLEVEL_OUTOF10: 10 - WORST POSSIBLE PAIN
PAINLEVEL_OUTOF10: 9

## 2025-07-28 NOTE — ED PROVIDER NOTES
Emergency Department Provider Note       History of Present Illness     History provided by: Patient  Limitations to History: None  External Records Reviewed with Brief Summary: None    I assumed care of patient from Dr. Garcia. Labs and imaging were reviewed and remarkable for Hgb 8.3, reticulocytosis, and pulmonary edema. Patient requiring opioids for pain and supplemental oxygen. To be admitted.     HPI:  Senait Narvaez is a 55 y.o. 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 She states that she went in for her regular sickle cell appointment in [facility] and was sent to the ED by her doctor after sats were in the 70's on room air. She has been feeling increasingly poor for the past 3 days. She endorses headache and nausea. Patient is experiencing a posterior headache which is new for her. She says her overall pain is a 20/10. Patient denies fever, vomiting, SOB, and CP. She says her current pain is similar to her other sickle cell crises, but the posterior headache is new. Patient is stable and satting 98 on 2 L of oxygen. Patient is not on oxygen at home besides CPAP at night.     Physical Exam   Triage vitals:  T 36.6 °C (97.8 °F)  HR 79  /84  RR 16  O2 (!) 90 % None (Room air)    General: Awake, moderately drowsy, in pain   Eyes: Gaze conjugate.  No scleral icterus or injection  HENT: Normo-cephalic, atraumatic. No stridor  CV: Regular rate, regular rhythm. Radial pulses 2+ bilaterally  Resp: Breathing non-labored, speaking in full sentences.    GI: Soft, non-distended, non-tender. No rebound or guarding.  MSK/Extremities: No gross bony deformities. Moving all  extremities  Skin: Warm. Appropriate color  Neuro: Alert. Oriented. Face symmetric. Speech is fluent.  Gross strength and sensation intact in b/l UE and LEs  Psych: Appropriate mood and affect      Medical Decision Making & ED Course   Medical Decision Makin y.o. female with sickle cell presenting with her typical sickle cell pain crises. At bedside, patient was hemodynamically stable but in severe pain. Lungs are CTAB. Heart RRR. Plan to control patients pain and admit for further monitoring given new oxygen requirement.     Patient has received dilaudid 2mg IV x3, 2L O2 nc, diphenhydramine 25mg po while in the ED.     Heme attending consulted to make recommendation for admission to medicine vs MICU (if concern for acute chest syndrome) -- hematology recommended admission to medicine.       ----      Differential diagnoses considered include but are not limited to: sickle cell pain crisis, CVA, acute chest syndrome     Social Determinants of Health which Significantly Impact Care: Social Determinants of Health which Significantly Impact Care: None identified     EKG Independent Interpretation: interpreted by attending    Independent Result Review and Interpretation: None obtained    Chronic conditions affecting the patient's care: As documented above in [facility]    The patient was discussed with the following consultants/services: Dr. Whaley(Hematology)    Care Considerations: As documented above in [facility]    ED Course:  Diagnoses as of 25   Sickle cell anemia with pain     55 year old female presenting to the ED with sickle cell crisis. Patient appears uncomfortable at bedside and says this is typical for her sickle cell pain crises. The patients pain has been controlled with Dilaudid 2 mg IV q 1hr PRN (3 doses given in total). Patient on supplemental O2 2L nc. Zofran 4 mg IV given for nausea. Benadryl 25mg po given for opioid-induced pruritus. CT head non contrast ordered to further assess new onset  posterior headache and was unremarkable. Chest XRAY to further assess hypoxia. Chest XRAY showed findings compatible with a degree of pulmonary edema. A component of the fullness to the nir could be related to dilated pulmonary arterial tree secondary to pulmonary arterial hypertension. Lasix 20mg po administered.  EKG ordered - see ED attending interpretation.     Disposition   As a result of their workup, the patient will require admission to the hospital.  The patient was informed of her diagnosis.  The patient was given the opportunity to ask questions and I answered them. The patient agreed to be admitted to the hospital.    Procedures   Procedures    Patient seen and discussed with ED attending physician.    Ciarra Boss DO  Psychiatry Resident, PGY1                                                       Ciarra Boss DO  Resident  07/28/25 [phone]       Ciarra Boss DO  Resident  07/29/25 [id number]

## 2025-07-28 NOTE — ED TRIAGE NOTES
Pt presents to ED from [facility] Cell [facility] in [facility] for unrelieved pain all over and admission. Pt placed on 2 litters of oxygen via canula in triage, pt denies SOB

## 2025-07-28 NOTE — ED PROVIDER NOTES
Emergency Department Provider Note       History of Present Illness     History provided by: Patient  Limitations to History: None  External Records Reviewed with Brief Summary: None    HPI:  Senait Narvaez is a 55 y.o. 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 She states that she went in for her regular sickle cell appointment in [facility] and was sent to the ED by her doctor after sats were in the 70's on room air. She has been feeling increasingly poor for the past 3 days. She endorses headache and nausea. Patient is experiencing a posterior headache which is new for her. She says her overall pain is a 20/10. Patient denies fever, vomiting, SOB, and CP. She says her current pain is similar to her other sickle cell crises, but the posterior headache is new. Patient is stable and satting 98 on 2 L of oxygen. Patient is not on oxygen at home besides CPAP at night.   Physical Exam   Triage vitals:  T 36.6 °C (97.8 °F)  HR 79  /84  RR 16  O2 (!) 90 % None (Room air)    General: Awake, alert, in no acute distress  Eyes: Gaze conjugate.  No scleral icterus or injection  HENT: Normo-cephalic, atraumatic. No stridor  CV: Regular rate, regular rhythm. Radial pulses 2+ bilaterally  Resp: Breathing non-labored, speaking in full sentences.  Clear to auscultation bilaterally  GI: Soft, non-distended, non-tender. No rebound or guarding.  MSK/Extremities: No gross bony deformities. Moving all extremities  Skin: Warm. Appropriate color  Neuro: Alert. Oriented. Face symmetric. Speech is fluent.  Gross strength and sensation intact in b/l UE and LEs  Psych: Appropriate mood and  affect      Medical Decision Making & ED Course   Medical Decision Makin y.o. female with sickle cell presenting with her typical sickle cell pain crises. At bedside, patient was hemodynamically stable but in severe pain. Lungs are CTAB. Heart RRR. Plan to control patients pain and admit for further monitoring given new oxygen requirement.   ----     Differential diagnoses considered include but are not limited to: sickle cell crisis    Social Determinants of Health which Significantly Impact Care: Social Determinants of Health which Significantly Impact Care: None identified The following actions were taken to address these social determinants : none    Independent Result Review and Interpretation:   Chest XRAY: Findings compatible with a degree of pulmonary edema. A component of  the fullness to the nir could be related to dilated pulmonary arterial tree secondary to pulmonary arterial hypertension.    EKG Findings: Normal sinus rhythm, Right ventricular hypertrophy, ST & T wave abnormality, consider anterolateral ischemia    CT Head: No evidence acute intracranial hemorrhage or of large territorial  infarct.     Chronic conditions affecting the patient's care: As documented above in MDM    The patient was discussed with the following consultants/services: Admission Coordinator who accepted the patient for admission    Care Considerations: None    ED Course:  Diagnoses as of 25 1255   Sickle cell anemia with pain   55 year old female presenting to the ED with sickle cell crisis. Patient appears uncomfortable at bedside and says this is typical for her sickle cell pain crises. The patients pain has been controlled with Dilaudid 2 mg IV q 1hr PRN. Zofran 4 mg IV given for nausea. CT head non contrast ordered to further assess new onset posterior headache. Chest XRAY to further assess hypoxia. Chest XRAY showed findings compatible with a degree of pulmonary edema. A component of the fullness to the nir  could be related to dilated pulmonary arterial tree secondary to pulmonary arterial hypertension. EKG ordered.     Disposition   As a result of their workup, the patient will require admission to the hospital.  The patient was informed of her diagnosis.  The patient was given the opportunity to ask questions and I answered them. The patient agreed to be admitted to the hospital.    Procedures   Procedures        Rebeca Garcia,   Emergency Medicine                                                       Rebeca Garcia DO  Resident  07/28/25 [id number]       Rebeca Garcia DO  Resident  07/31/25 [id number]

## 2025-07-28 NOTE — TELEPHONE ENCOUNTER
Call returned to pt in regard to wait time in the ED without pain medication. Per message pt stated she had been sitting in the waiting room without pain medication. This nurse spoke to the pt who states she is now in the back to be seen.

## 2025-07-29 ENCOUNTER — APPOINTMENT (OUTPATIENT)
Dept: RADIOLOGY | Facility: HOSPITAL | Age: 55
DRG: 811 | End: 2025-07-29
Payer: COMMERCIAL

## 2025-07-29 ENCOUNTER — APPOINTMENT (OUTPATIENT)
Dept: OTHER | Facility: HOSPITAL | Age: 55
DRG: 811 | End: 2025-07-29
Payer: COMMERCIAL

## 2025-07-29 LAB
ALBUMIN SERPL BCP-MCNC: 4.2 G/DL (ref 3.4–5)
ALP SERPL-CCNC: 181 U/L (ref 33–110)
ALT SERPL W P-5'-P-CCNC: 10 U/L (ref 7–45)
AMPHETAMINES UR QL SCN: NORMAL
ANION GAP SERPL CALC-SCNC: 14 MMOL/L (ref 10–20)
APPEARANCE UR: ABNORMAL
APTT PPP: 30 SECONDS (ref 26–36)
AST SERPL W P-5'-P-CCNC: 21 U/L (ref 9–39)
ATRIAL RATE: 88 BPM
BACTERIA #/AREA URNS AUTO: ABNORMAL /HPF
BARBITURATES UR QL SCN: NORMAL
BASOPHILS # BLD MANUAL: 0 X10*3/UL (ref 0–0.1)
BASOPHILS NFR BLD MANUAL: 0 %
BILIRUB SERPL-MCNC: 3 MG/DL (ref 0–1.2)
BILIRUB UR STRIP.AUTO-MCNC: NEGATIVE MG/DL
BLASTS # BLD MANUAL: 0 X10*3/UL
BLASTS NFR BLD MANUAL: 0 %
BLOOD EXPIRATION DATE: NORMAL
BUN SERPL-MCNC: 24 MG/DL (ref 6–23)
BZE UR QL SCN: NORMAL
CA-I BLD-SCNC: 0.95 MMOL/L (ref 1.1–1.33)
CALCIUM SERPL-MCNC: 9 MG/DL (ref 8.6–10.6)
CANNABINOIDS UR QL SCN: NORMAL
CHLORIDE SERPL-SCNC: 99 MMOL/L (ref 98–107)
CHLORIDE UR-SCNC: 61 MMOL/L
CHLORIDE/CREATININE (MMOL/G) IN URINE: 30 MMOL/G CREAT (ref 38–318)
CO2 SERPL-SCNC: 28 MMOL/L (ref 21–32)
COLOR UR: YELLOW
CREAT SERPL-MCNC: 1.73 MG/DL (ref 0.5–1.05)
CREAT UR-MCNC: 202.4 MG/DL (ref 20–320)
CREAT UR-MCNC: 203.2 MG/DL (ref 20–320)
CRP SERPL-MCNC: 4.87 MG/DL
DISPENSE STATUS: NORMAL
EGFRCR SERPLBLD CKD-EPI 2021: 34 ML/MIN/1.73M*2
EOSINOPHIL # BLD MANUAL: 0 X10*3/UL (ref 0–0.7)
EOSINOPHIL NFR BLD MANUAL: 0 %
ERYTHROCYTE [DISTWIDTH] IN BLOOD BY AUTOMATED COUNT: 23 % (ref 11.5–14.5)
ERYTHROCYTE [DISTWIDTH] IN BLOOD BY AUTOMATED COUNT: 30.4 % (ref 11.5–14.5)
ERYTHROCYTE [SEDIMENTATION RATE] IN BLOOD BY WESTERGREN METHOD: 20 MM/H (ref 0–30)
GLUCOSE SERPL-MCNC: 90 MG/DL (ref 74–99)
GLUCOSE UR STRIP.AUTO-MCNC: NORMAL MG/DL
HCT VFR BLD AUTO: 29 % (ref 36–46)
HCT VFR BLD AUTO: 32.1 % (ref 36–46)
HEMOGLOBIN A2: 3.5 % (ref 2–3.5)
HEMOGLOBIN A2: 3.5 % (ref 2–3.5)
HEMOGLOBIN A: 42.3 % (ref 95.8–98)
HEMOGLOBIN A: 42.4 % (ref 95.8–98)
HEMOGLOBIN C: 26.6 %
HEMOGLOBIN C: 26.6 %
HEMOGLOBIN F: 0.5 % (ref 0–2)
HEMOGLOBIN F: 0.5 % (ref 0–2)
HEMOGLOBIN IDENTIFICATION INTERPRETATION: ABNORMAL
HEMOGLOBIN IDENTIFICATION INTERPRETATION: ABNORMAL
HEMOGLOBIN S: 27 %
HEMOGLOBIN S: 27.1 %
HGB BLD-MCNC: 10.1 G/DL (ref 12–16)
HGB BLD-MCNC: 9 G/DL (ref 12–16)
HGB RETIC QN: 28 PG (ref 28–38)
HYALINE CASTS #/AREA URNS AUTO: ABNORMAL /LPF
HYPOCHROMIA BLD QL SMEAR: ABNORMAL
IMM GRANULOCYTES # BLD AUTO: 0.1 X10*3/UL (ref 0–0.7)
IMM GRANULOCYTES NFR BLD AUTO: 0.8 % (ref 0–0.9)
IMMATURE RETIC FRACTION: 24.8 %
INR PPP: 2.8 (ref 0.9–1.1)
INR PPP: 2.9 (ref 0.9–1.1)
KETONES UR STRIP.AUTO-MCNC: NEGATIVE MG/DL
LDH SERPL L TO P-CCNC: 323 U/L (ref 84–246)
LEUKOCYTE ESTERASE UR QL STRIP.AUTO: NEGATIVE
LYMPHOCYTES # BLD MANUAL: 0.76 X10*3/UL (ref 1.2–4.8)
LYMPHOCYTES NFR BLD MANUAL: 6.1 %
MCH RBC QN AUTO: 23.9 PG (ref 26–34)
MCH RBC QN AUTO: 26.1 PG (ref 26–34)
MCHC RBC AUTO-ENTMCNC: 31 G/DL (ref 32–36)
MCHC RBC AUTO-ENTMCNC: 31.5 G/DL (ref 32–36)
MCV RBC AUTO: 76 FL (ref 80–100)
MCV RBC AUTO: 84 FL (ref 80–100)
METAMYELOCYTES # BLD MANUAL: 0 X10*3/UL
METAMYELOCYTES NFR BLD MANUAL: 0 %
MONOCYTES # BLD MANUAL: 0.43 X10*3/UL (ref 0.1–1)
MONOCYTES NFR BLD MANUAL: 3.5 %
MUCOUS THREADS #/AREA URNS AUTO: ABNORMAL /LPF
MYELOCYTES # BLD MANUAL: 0 X10*3/UL
MYELOCYTES NFR BLD MANUAL: 0 %
NEUTROPHILS # BLD MANUAL: 11.21 X10*3/UL (ref 1.2–7.7)
NEUTS BAND # BLD MANUAL: 0 X10*3/UL (ref 0–0.7)
NEUTS BAND NFR BLD MANUAL: 0 %
NEUTS SEG # BLD MANUAL: 11.21 X10*3/UL (ref 1.2–7)
NEUTS SEG NFR BLD MANUAL: 90.4 %
NITRITE UR QL STRIP.AUTO: NEGATIVE
NRBC BLD MANUAL-RTO: 0 % (ref 0–0)
NRBC BLD-RTO: 42.5 /100 WBCS (ref 0–0)
NRBC BLD-RTO: 49.9 /100 WBCS (ref 0–0)
P AXIS: 67 DEGREES
P OFFSET: 181 MS
P ONSET: 137 MS
PATH REVIEW-HGB IDENTIFICATION: ABNORMAL
PATH REVIEW-HGB IDENTIFICATION: ABNORMAL
PCP UR QL SCN: NORMAL
PH UR STRIP.AUTO: 6 [PH]
PLASMA CELLS # BLD MANUAL: 0 X10*3/UL
PLASMA CELLS NFR BLD MANUAL: 0 %
PLATELET # BLD AUTO: 268 X10*3/UL (ref 150–450)
PLATELET # BLD AUTO: ABNORMAL 10*3/UL
POLYCHROMASIA BLD QL SMEAR: ABNORMAL
POTASSIUM SERPL-SCNC: 5 MMOL/L (ref 3.5–5.3)
POTASSIUM UR-SCNC: 25 MMOL/L
POTASSIUM/CREAT UR-RTO: 12 MMOL/G CREAT
PR INTERVAL: 154 MS
PRODUCT BLOOD TYPE: 1700
PRODUCT CODE: NORMAL
PROMYELOCYTES # BLD MANUAL: 0 X10*3/UL
PROMYELOCYTES NFR BLD MANUAL: 0 %
PROT SERPL-MCNC: 7.9 G/DL (ref 6.4–8.2)
PROT UR STRIP.AUTO-MCNC: ABNORMAL MG/DL
PROTHROMBIN TIME: 31 SECONDS (ref 9.8–12.4)
PROTHROMBIN TIME: 32.2 SECONDS (ref 9.8–12.4)
Q ONSET: 214 MS
QRS COUNT: 14 BEATS
QRS DURATION: 82 MS
QT INTERVAL: 370 MS
QTC CALCULATION(BAZETT): 447 MS
QTC FREDERICIA: 420 MS
R AXIS: 144 DEGREES
RBC # BLD AUTO: 3.45 X10*6/UL (ref 4–5.2)
RBC # BLD AUTO: 4.22 X10*6/UL (ref 4–5.2)
RBC # UR STRIP.AUTO: NEGATIVE MG/DL
RBC #/AREA URNS AUTO: ABNORMAL /HPF
RBC MORPH BLD: ABNORMAL
RETICS #: 0.37 X10*6/UL (ref 0.02–0.08)
RETICS/RBC NFR AUTO: 8.8 % (ref 0.5–2)
SCHISTOCYTES BLD QL SMEAR: ABNORMAL
SODIUM SERPL-SCNC: 136 MMOL/L (ref 136–145)
SODIUM UR-SCNC: 56 MMOL/L
SODIUM/CREAT UR-RTO: 28 MMOL/G CREAT
SP GR UR STRIP.AUTO: 1.01
SQUAMOUS #/AREA URNS AUTO: ABNORMAL /HPF
T AXIS: 121 DEGREES
T OFFSET: 399 MS
TARGETS BLD QL SMEAR: ABNORMAL
TOTAL CELLS COUNTED BLD: 115
UNIT ABO: NORMAL
UNIT NUMBER: NORMAL
UNIT RH: NORMAL
UNIT VOLUME: 267
UNIT VOLUME: 270
UNIT VOLUME: 275
UNIT VOLUME: 277
UNIT VOLUME: 350
UNIT VOLUME: 350
UROBILINOGEN UR STRIP.AUTO-MCNC: ABNORMAL MG/DL
VARIANT LYMPHS # BLD MANUAL: 0 X10*3/UL (ref 0–0.5)
VARIANT LYMPHS NFR BLD: 0 %
VENTRICULAR RATE: 88 BPM
WBC # BLD AUTO: 12.4 X10*3/UL (ref 4.4–11.3)
WBC # BLD AUTO: 6.8 X10*3/UL (ref 4.4–11.3)
WBC #/AREA URNS AUTO: ABNORMAL /HPF
WBC OTHER # BLD MANUAL: 0 X10*3/UL
WBC OTHER NFR BLD MANUAL: 0 %
XM INTEP: NORMAL

## 2025-07-29 PROCEDURE — 86902 BLOOD TYPE ANTIGEN DONOR EA: CPT

## 2025-07-29 PROCEDURE — 7100000010 HC PHASE TWO TIME - EACH INCREMENTAL 1 MINUTE

## 2025-07-29 PROCEDURE — C1752 CATH,HEMODIALYSIS,SHORT-TERM: HCPCS

## 2025-07-29 PROCEDURE — 83615 LACTATE (LD) (LDH) ENZYME: CPT

## 2025-07-29 PROCEDURE — 36415 COLL VENOUS BLD VENIPUNCTURE: CPT

## 2025-07-29 PROCEDURE — 85027 COMPLETE CBC AUTOMATED: CPT

## 2025-07-29 PROCEDURE — 2500000002 HC RX 250 W HCPCS SELF ADMINISTERED DRUGS (ALT 637 FOR MEDICARE OP, ALT 636 FOR OP/ED): Performed by: NURSE PRACTITIONER

## 2025-07-29 PROCEDURE — 85610 PROTHROMBIN TIME: CPT | Performed by: NURSE PRACTITIONER

## 2025-07-29 PROCEDURE — 02HV33Z INSERTION OF INFUSION DEVICE INTO SUPERIOR VENA CAVA, PERCUTANEOUS APPROACH: ICD-10-PCS | Performed by: RADIOLOGY

## 2025-07-29 PROCEDURE — C1894 INTRO/SHEATH, NON-LASER: HCPCS

## 2025-07-29 PROCEDURE — 85045 AUTOMATED RETICULOCYTE COUNT: CPT

## 2025-07-29 PROCEDURE — 80346 BENZODIAZEPINES1-12: CPT

## 2025-07-29 PROCEDURE — 2500000001 HC RX 250 WO HCPCS SELF ADMINISTERED DRUGS (ALT 637 FOR MEDICARE OP)

## 2025-07-29 PROCEDURE — 5A09357 ASSISTANCE WITH RESPIRATORY VENTILATION, LESS THAN 24 CONSECUTIVE HOURS, CONTINUOUS POSITIVE AIRWAY PRESSURE: ICD-10-PCS

## 2025-07-29 PROCEDURE — 82436 ASSAY OF URINE CHLORIDE: CPT | Performed by: NURSE PRACTITIONER

## 2025-07-29 PROCEDURE — 36415 COLL VENOUS BLD VENIPUNCTURE: CPT | Performed by: NURSE PRACTITIONER

## 2025-07-29 PROCEDURE — 1200000003 HC ONCOLOGY  ROOM WITH TELEMETRY DAILY

## 2025-07-29 PROCEDURE — 85007 BL SMEAR W/DIFF WBC COUNT: CPT

## 2025-07-29 PROCEDURE — P9040 RBC LEUKOREDUCED IRRADIATED: HCPCS

## 2025-07-29 PROCEDURE — 80053 COMPREHEN METABOLIC PANEL: CPT

## 2025-07-29 PROCEDURE — 36556 INSERT NON-TUNNEL CV CATH: CPT | Performed by: RADIOLOGY

## 2025-07-29 PROCEDURE — 2780000003 HC OR 278 NO HCPCS

## 2025-07-29 PROCEDURE — 85730 THROMBOPLASTIN TIME PARTIAL: CPT

## 2025-07-29 PROCEDURE — C1769 GUIDE WIRE: HCPCS

## 2025-07-29 PROCEDURE — 99233 SBSQ HOSP IP/OBS HIGH 50: CPT | Performed by: NURSE PRACTITIONER

## 2025-07-29 PROCEDURE — 87086 URINE CULTURE/COLONY COUNT: CPT | Performed by: NURSE PRACTITIONER

## 2025-07-29 PROCEDURE — 2500000004 HC RX 250 GENERAL PHARMACY W/ HCPCS (ALT 636 FOR OP/ED)

## 2025-07-29 PROCEDURE — 2500000001 HC RX 250 WO HCPCS SELF ADMINISTERED DRUGS (ALT 637 FOR MEDICARE OP): Performed by: NURSE PRACTITIONER

## 2025-07-29 PROCEDURE — 2720000007 HC OR 272 NO HCPCS

## 2025-07-29 PROCEDURE — 76942 ECHO GUIDE FOR BIOPSY: CPT | Performed by: RADIOLOGY

## 2025-07-29 PROCEDURE — 77001 FLUOROGUIDE FOR VEIN DEVICE: CPT | Performed by: RADIOLOGY

## 2025-07-29 PROCEDURE — 82330 ASSAY OF CALCIUM: CPT

## 2025-07-29 PROCEDURE — 82570 ASSAY OF URINE CREATININE: CPT

## 2025-07-29 PROCEDURE — 99233 SBSQ HOSP IP/OBS HIGH 50: CPT | Performed by: INTERNAL MEDICINE

## 2025-07-29 PROCEDURE — 86140 C-REACTIVE PROTEIN: CPT

## 2025-07-29 PROCEDURE — 2500000004 HC RX 250 GENERAL PHARMACY W/ HCPCS (ALT 636 FOR OP/ED): Performed by: RADIOLOGY

## 2025-07-29 PROCEDURE — 99152 MOD SED SAME PHYS/QHP 5/>YRS: CPT

## 2025-07-29 PROCEDURE — 81001 URINALYSIS AUTO W/SCOPE: CPT | Performed by: NURSE PRACTITIONER

## 2025-07-29 PROCEDURE — 36512 APHERESIS RBC: CPT

## 2025-07-29 PROCEDURE — 2500000004 HC RX 250 GENERAL PHARMACY W/ HCPCS (ALT 636 FOR OP/ED): Mod: JZ,TB

## 2025-07-29 PROCEDURE — 2500000002 HC RX 250 W HCPCS SELF ADMINISTERED DRUGS (ALT 637 FOR MEDICARE OP, ALT 636 FOR OP/ED)

## 2025-07-29 PROCEDURE — 83021 HEMOGLOBIN CHROMOTOGRAPHY: CPT

## 2025-07-29 PROCEDURE — 7100000009 HC PHASE TWO TIME - INITIAL BASE CHARGE

## 2025-07-29 PROCEDURE — 85652 RBC SED RATE AUTOMATED: CPT

## 2025-07-29 RX ORDER — FENTANYL CITRATE 50 UG/ML
INJECTION, SOLUTION INTRAMUSCULAR; INTRAVENOUS
Status: COMPLETED | OUTPATIENT
Start: 2025-07-29 | End: 2025-07-29

## 2025-07-29 RX ORDER — ACETAMINOPHEN 325 MG/1
650 TABLET ORAL ONCE
Status: COMPLETED | OUTPATIENT
Start: 2025-07-29 | End: 2025-07-29

## 2025-07-29 RX ORDER — CALCIUM CARBONATE 200(500)MG
1500 TABLET,CHEWABLE ORAL EVERY 5 MIN PRN
Status: DISCONTINUED | OUTPATIENT
Start: 2025-07-29 | End: 2025-07-29 | Stop reason: HOSPADM

## 2025-07-29 RX ORDER — HEPARIN SODIUM 1000 [USP'U]/ML
1000 INJECTION, SOLUTION INTRAVENOUS; SUBCUTANEOUS ONCE
Status: COMPLETED | OUTPATIENT
Start: 2025-07-29 | End: 2025-07-29

## 2025-07-29 RX ORDER — DIPHENHYDRAMINE HCL 25 MG
25 CAPSULE ORAL ONCE
Status: COMPLETED | OUTPATIENT
Start: 2025-07-29 | End: 2025-07-29

## 2025-07-29 RX ORDER — DIPHENHYDRAMINE HYDROCHLORIDE 50 MG/ML
25 INJECTION, SOLUTION INTRAMUSCULAR; INTRAVENOUS EVERY 5 MIN PRN
Status: DISCONTINUED | OUTPATIENT
Start: 2025-07-29 | End: 2025-07-29 | Stop reason: HOSPADM

## 2025-07-29 RX ORDER — POLYETHYLENE GLYCOL 3350 17 G/17G
17 POWDER, FOR SOLUTION ORAL DAILY
Status: DISCONTINUED | OUTPATIENT
Start: 2025-07-29 | End: 2025-08-05 | Stop reason: HOSPADM

## 2025-07-29 RX ORDER — MIDAZOLAM HYDROCHLORIDE 2 MG/2ML
INJECTION, SOLUTION INTRAMUSCULAR; INTRAVENOUS
Status: COMPLETED | OUTPATIENT
Start: 2025-07-29 | End: 2025-07-29

## 2025-07-29 RX ORDER — POLYETHYLENE GLYCOL 3350 17 G/17G
17 POWDER, FOR SOLUTION ORAL DAILY
Status: DISCONTINUED | OUTPATIENT
Start: 2025-07-29 | End: 2025-07-29

## 2025-07-29 RX ORDER — AMOXICILLIN 250 MG
2 CAPSULE ORAL 2 TIMES DAILY
Status: DISCONTINUED | OUTPATIENT
Start: 2025-07-29 | End: 2025-08-05 | Stop reason: HOSPADM

## 2025-07-29 RX ORDER — HYDROMORPHONE HYDROCHLORIDE 1 MG/ML
1 INJECTION, SOLUTION INTRAMUSCULAR; INTRAVENOUS; SUBCUTANEOUS EVERY 2 HOUR PRN
Status: DISCONTINUED | OUTPATIENT
Start: 2025-07-29 | End: 2025-07-31

## 2025-07-29 RX ADMIN — Medication: at 18:00

## 2025-07-29 RX ADMIN — DIPHENHYDRAMINE HYDROCHLORIDE 25 MG: 25 CAPSULE ORAL at 12:40

## 2025-07-29 RX ADMIN — HYDROMORPHONE HYDROCHLORIDE 2 MG: 2 INJECTION, SOLUTION INTRAMUSCULAR; INTRAVENOUS; SUBCUTANEOUS at 01:16

## 2025-07-29 RX ADMIN — HYDROMORPHONE HYDROCHLORIDE 1 MG: 1 INJECTION, SOLUTION INTRAMUSCULAR; INTRAVENOUS; SUBCUTANEOUS at 11:46

## 2025-07-29 RX ADMIN — Medication: at 20:25

## 2025-07-29 RX ADMIN — HYDROMORPHONE HYDROCHLORIDE 1 MG: 1 INJECTION, SOLUTION INTRAMUSCULAR; INTRAVENOUS; SUBCUTANEOUS at 06:52

## 2025-07-29 RX ADMIN — DIPHENHYDRAMINE HYDROCHLORIDE 25 MG: 25 CAPSULE ORAL at 21:52

## 2025-07-29 RX ADMIN — HYDROMORPHONE HYDROCHLORIDE 1 MG: 1 INJECTION, SOLUTION INTRAMUSCULAR; INTRAVENOUS; SUBCUTANEOUS at 16:20

## 2025-07-29 RX ADMIN — HYDROMORPHONE HYDROCHLORIDE 1 MG: 1 INJECTION, SOLUTION INTRAMUSCULAR; INTRAVENOUS; SUBCUTANEOUS at 21:36

## 2025-07-29 RX ADMIN — HYDROMORPHONE HYDROCHLORIDE 1 MG: 1 INJECTION, SOLUTION INTRAMUSCULAR; INTRAVENOUS; SUBCUTANEOUS at 09:26

## 2025-07-29 RX ADMIN — SENNOSIDES, DOCUSATE SODIUM 2 TABLET: 50; 8.6 TABLET, FILM COATED ORAL at 09:27

## 2025-07-29 RX ADMIN — METOPROLOL TARTRATE 25 MG: 25 TABLET, FILM COATED ORAL at 09:26

## 2025-07-29 RX ADMIN — ONDANSETRON HYDROCHLORIDE 8 MG: 8 TABLET, FILM COATED ORAL at 04:43

## 2025-07-29 RX ADMIN — HEPARIN SODIUM 1000 UNITS: 1000 INJECTION INTRAVENOUS; SUBCUTANEOUS at 14:43

## 2025-07-29 RX ADMIN — WARFARIN SODIUM 5 MG: 5 TABLET ORAL at 18:28

## 2025-07-29 RX ADMIN — HYDROMORPHONE HYDROCHLORIDE 1 MG: 1 INJECTION, SOLUTION INTRAMUSCULAR; INTRAVENOUS; SUBCUTANEOUS at 18:28

## 2025-07-29 RX ADMIN — Medication 2 APPLICATION: at 09:27

## 2025-07-29 RX ADMIN — HYDROMORPHONE HYDROCHLORIDE 1 MG: 1 INJECTION, SOLUTION INTRAMUSCULAR; INTRAVENOUS; SUBCUTANEOUS at 04:43

## 2025-07-29 RX ADMIN — PANTOPRAZOLE SODIUM 40 MG: 40 TABLET, DELAYED RELEASE ORAL at 06:51

## 2025-07-29 RX ADMIN — HYDROMORPHONE HYDROCHLORIDE 1 MG: 1 INJECTION, SOLUTION INTRAMUSCULAR; INTRAVENOUS; SUBCUTANEOUS at 14:08

## 2025-07-29 RX ADMIN — CALCIUM GLUCONATE 25 ML/HR: 20 INJECTION, SOLUTION INTRAVENOUS at 13:05

## 2025-07-29 RX ADMIN — FENTANYL CITRATE 50 MCG: 50 INJECTION, SOLUTION INTRAMUSCULAR; INTRAVENOUS at 11:02

## 2025-07-29 RX ADMIN — WARFARIN SODIUM 5 MG: 5 TABLET ORAL at 01:16

## 2025-07-29 RX ADMIN — ACETAMINOPHEN 650 MG: 325 TABLET ORAL at 12:40

## 2025-07-29 RX ADMIN — HEPARIN SODIUM 1000 UNITS: 1000 INJECTION INTRAVENOUS; SUBCUTANEOUS at 14:44

## 2025-07-29 RX ADMIN — MIDAZOLAM HYDROCHLORIDE 0.5 MG: 1 INJECTION, SOLUTION INTRAMUSCULAR; INTRAVENOUS at 11:02

## 2025-07-29 RX ADMIN — ONDANSETRON HYDROCHLORIDE 8 MG: 8 TABLET, FILM COATED ORAL at 14:04

## 2025-07-29 RX ADMIN — HYDROMORPHONE HYDROCHLORIDE 2 MG: 2 INJECTION, SOLUTION INTRAMUSCULAR; INTRAVENOUS; SUBCUTANEOUS at 23:38

## 2025-07-29 RX ADMIN — FUROSEMIDE 20 MG: 20 TABLET ORAL at 09:26

## 2025-07-29 RX ADMIN — METOPROLOL TARTRATE 25 MG: 25 TABLET, FILM COATED ORAL at 21:36

## 2025-07-29 RX ADMIN — FOLIC ACID 1 MG: 1 TABLET ORAL at 09:26

## 2025-07-29 RX ADMIN — CETIRIZINE HYDROCHLORIDE 10 MG: 10 TABLET ORAL at 09:26

## 2025-07-29 SDOH — SOCIAL STABILITY: SOCIAL INSECURITY: WITHIN THE LAST YEAR, HAVE YOU BEEN AFRAID OF YOUR PARTNER OR EX-PARTNER?: NO

## 2025-07-29 SDOH — ECONOMIC STABILITY: HOUSING INSECURITY: IN THE LAST 12 MONTHS, WAS THERE A TIME WHEN YOU WERE NOT ABLE TO PAY THE MORTGAGE OR RENT ON TIME?: NO

## 2025-07-29 SDOH — SOCIAL STABILITY: SOCIAL INSECURITY: ARE THERE ANY APPARENT SIGNS OF INJURIES/BEHAVIORS THAT COULD BE RELATED TO ABUSE/NEGLECT?: NO

## 2025-07-29 SDOH — SOCIAL STABILITY: SOCIAL NETWORK
DO YOU BELONG TO ANY CLUBS OR ORGANIZATIONS SUCH AS CHURCH GROUPS, UNIONS, FRATERNAL OR ATHLETIC GROUPS, OR SCHOOL GROUPS?: NO

## 2025-07-29 SDOH — HEALTH STABILITY: PHYSICAL HEALTH
HOW OFTEN DO YOU NEED TO HAVE SOMEONE HELP YOU WHEN YOU READ INSTRUCTIONS, PAMPHLETS, OR OTHER WRITTEN MATERIAL FROM YOUR DOCTOR OR PHARMACY?: NEVER

## 2025-07-29 SDOH — SOCIAL STABILITY: SOCIAL INSECURITY: WITHIN THE LAST YEAR, HAVE YOU BEEN HUMILIATED OR EMOTIONALLY ABUSED IN OTHER WAYS BY YOUR PARTNER OR EX-PARTNER?: NO

## 2025-07-29 SDOH — SOCIAL STABILITY: SOCIAL NETWORK
IN A TYPICAL WEEK, HOW MANY TIMES DO YOU TALK ON THE PHONE WITH FAMILY, FRIENDS, OR NEIGHBORS?: MORE THAN THREE TIMES A WEEK

## 2025-07-29 SDOH — SOCIAL STABILITY: SOCIAL INSECURITY: HAVE YOU HAD ANY THOUGHTS OF HARMING ANYONE ELSE?: NO

## 2025-07-29 SDOH — ECONOMIC STABILITY: FOOD INSECURITY: WITHIN THE PAST 12 MONTHS, YOU WORRIED THAT YOUR FOOD WOULD RUN OUT BEFORE YOU GOT THE MONEY TO BUY MORE.: NEVER TRUE

## 2025-07-29 SDOH — ECONOMIC STABILITY: FOOD INSECURITY: WITHIN THE PAST 12 MONTHS, THE FOOD YOU BOUGHT JUST DIDN'T LAST AND YOU DIDN'T HAVE MONEY TO GET MORE.: NEVER TRUE

## 2025-07-29 SDOH — SOCIAL STABILITY: SOCIAL NETWORK: HOW OFTEN DO YOU ATTEND MEETINGS OF THE CLUBS OR ORGANIZATIONS YOU BELONG TO?: NEVER

## 2025-07-29 SDOH — ECONOMIC STABILITY: INCOME INSECURITY: IN THE PAST 12 MONTHS HAS THE ELECTRIC, GAS, OIL, OR WATER COMPANY THREATENED TO SHUT OFF SERVICES IN YOUR HOME?: NO

## 2025-07-29 SDOH — SOCIAL STABILITY: SOCIAL INSECURITY: DO YOU FEEL ANYONE HAS EXPLOITED OR TAKEN ADVANTAGE OF YOU FINANCIALLY OR OF YOUR PERSONAL PROPERTY?: NO

## 2025-07-29 SDOH — ECONOMIC STABILITY: HOUSING INSECURITY: AT ANY TIME IN THE PAST 12 MONTHS, WERE YOU HOMELESS OR LIVING IN A SHELTER (INCLUDING NOW)?: NO

## 2025-07-29 SDOH — ECONOMIC STABILITY: FOOD INSECURITY: HOW HARD IS IT FOR YOU TO PAY FOR THE VERY BASICS LIKE FOOD, HOUSING, MEDICAL CARE, AND HEATING?: NOT VERY HARD

## 2025-07-29 SDOH — SOCIAL STABILITY: SOCIAL NETWORK: HOW OFTEN DO YOU GET TOGETHER WITH FRIENDS OR RELATIVES?: MORE THAN THREE TIMES A WEEK

## 2025-07-29 SDOH — HEALTH STABILITY: PHYSICAL HEALTH: ON AVERAGE, HOW MANY MINUTES DO YOU ENGAGE IN EXERCISE AT THIS LEVEL?: 80 MIN

## 2025-07-29 SDOH — SOCIAL STABILITY: SOCIAL INSECURITY: ARE YOU OR HAVE YOU BEEN THREATENED OR ABUSED PHYSICALLY, EMOTIONALLY, OR SEXUALLY BY ANYONE?: NO

## 2025-07-29 SDOH — ECONOMIC STABILITY: TRANSPORTATION INSECURITY: IN THE PAST 12 MONTHS, HAS LACK OF TRANSPORTATION KEPT YOU FROM MEDICAL APPOINTMENTS OR FROM GETTING MEDICATIONS?: NO

## 2025-07-29 SDOH — HEALTH STABILITY: MENTAL HEALTH
DO YOU FEEL STRESS - TENSE, RESTLESS, NERVOUS, OR ANXIOUS, OR UNABLE TO SLEEP AT NIGHT BECAUSE YOUR MIND IS TROUBLED ALL THE TIME - THESE DAYS?: ONLY A LITTLE

## 2025-07-29 SDOH — SOCIAL STABILITY: SOCIAL INSECURITY: DOES ANYONE TRY TO KEEP YOU FROM HAVING/CONTACTING OTHER FRIENDS OR DOING THINGS OUTSIDE YOUR HOME?: NO

## 2025-07-29 SDOH — SOCIAL STABILITY: SOCIAL INSECURITY: WERE YOU ABLE TO COMPLETE ALL THE BEHAVIORAL HEALTH SCREENINGS?: YES

## 2025-07-29 SDOH — SOCIAL STABILITY: SOCIAL NETWORK: HOW OFTEN DO YOU ATTEND CHURCH OR RELIGIOUS SERVICES?: 1 TO 4 TIMES PER YEAR

## 2025-07-29 SDOH — ECONOMIC STABILITY: HOUSING INSECURITY: IN THE PAST 12 MONTHS, HOW MANY TIMES HAVE YOU MOVED WHERE YOU WERE LIVING?: 0

## 2025-07-29 SDOH — SOCIAL STABILITY: SOCIAL INSECURITY: HAVE YOU HAD THOUGHTS OF HARMING ANYONE ELSE?: NO

## 2025-07-29 SDOH — SOCIAL STABILITY: SOCIAL INSECURITY: ABUSE: ADULT

## 2025-07-29 SDOH — SOCIAL STABILITY: SOCIAL INSECURITY: ARE YOU MARRIED, WIDOWED, DIVORCED, SEPARATED, NEVER MARRIED, OR LIVING WITH A PARTNER?: PATIENT DECLINED

## 2025-07-29 SDOH — HEALTH STABILITY: PHYSICAL HEALTH: ON AVERAGE, HOW MANY DAYS PER WEEK DO YOU ENGAGE IN MODERATE TO STRENUOUS EXERCISE (LIKE A BRISK WALK)?: 2 DAYS

## 2025-07-29 SDOH — SOCIAL STABILITY: SOCIAL INSECURITY: HAS ANYONE EVER THREATENED TO HURT YOUR FAMILY OR YOUR PETS?: NO

## 2025-07-29 SDOH — SOCIAL STABILITY: SOCIAL INSECURITY: DO YOU FEEL UNSAFE GOING BACK TO THE PLACE WHERE YOU ARE LIVING?: NO

## 2025-07-29 ASSESSMENT — PAIN SCALES - GENERAL
PAINLEVEL_OUTOF10: 0 - NO PAIN
PAINLEVEL_OUTOF10: 8
PAINLEVEL_OUTOF10: 0 - NO PAIN
PAINLEVEL_OUTOF10: 7
PAINLEVEL_OUTOF10: 6
PAINLEVEL_OUTOF10: 6
PAINLEVEL_OUTOF10: 5 - MODERATE PAIN
PAINLEVEL_OUTOF10: 6
PAINLEVEL_OUTOF10: 8
PAINLEVEL_OUTOF10: 5 - MODERATE PAIN
PAINLEVEL_OUTOF10: 8
PAINLEVEL_OUTOF10: 0 - NO PAIN
PAINLEVEL_OUTOF10: 8
PAINLEVEL_OUTOF10: 0 - NO PAIN
PAINLEVEL_OUTOF10: 0 - NO PAIN
PAINLEVEL_OUTOF10: 6
PAINLEVEL_OUTOF10: 7
PAINLEVEL_OUTOF10: 9
PAINLEVEL_OUTOF10: 8

## 2025-07-29 ASSESSMENT — PAIN - FUNCTIONAL ASSESSMENT

## 2025-07-29 ASSESSMENT — LIFESTYLE VARIABLES
SKIP TO QUESTIONS 9-10: 1
AUDIT-C TOTAL SCORE: 0
PRESCIPTION_ABUSE_PAST_12_MONTHS: NO
HOW OFTEN DO YOU HAVE A DRINK CONTAINING ALCOHOL: NEVER
HOW OFTEN DO YOU HAVE 6 OR MORE DRINKS ON ONE OCCASION: NEVER
SUBSTANCE_ABUSE_PAST_12_MONTHS: NO
AUDIT-C TOTAL SCORE: 0
HOW MANY STANDARD DRINKS CONTAINING ALCOHOL DO YOU HAVE ON A TYPICAL DAY: PATIENT DOES NOT DRINK

## 2025-07-29 ASSESSMENT — COGNITIVE AND FUNCTIONAL STATUS - GENERAL
DAILY ACTIVITIY SCORE: 24
MOBILITY SCORE: 24
DAILY ACTIVITIY SCORE: 24
MOBILITY SCORE: 24
DAILY ACTIVITIY SCORE: 24
MOBILITY SCORE: 24
PATIENT BASELINE BEDBOUND: NO

## 2025-07-29 ASSESSMENT — ACTIVITIES OF DAILY LIVING (ADL)
ADEQUATE_TO_COMPLETE_ADL: YES
LACK_OF_TRANSPORTATION: NO
LACK_OF_TRANSPORTATION: NO
LACK_OF_TRANSPORTATION: YES
DRESSING YOURSELF: INDEPENDENT
PATIENT'S MEMORY ADEQUATE TO SAFELY COMPLETE DAILY ACTIVITIES?: YES
FEEDING YOURSELF: INDEPENDENT
BATHING: INDEPENDENT
JUDGMENT_ADEQUATE_SAFELY_COMPLETE_DAILY_ACTIVITIES: YES
HEARING - RIGHT EAR: FUNCTIONAL
HEARING - LEFT EAR: FUNCTIONAL
LACK_OF_TRANSPORTATION: YES
GROOMING: INDEPENDENT
TOILETING: INDEPENDENT
WALKS IN HOME: INDEPENDENT

## 2025-07-29 ASSESSMENT — ENCOUNTER SYMPTOMS
WOUND: 0
MYALGIAS: 1
DIARRHEA: 0
CONSTIPATION: 0
ARTHRALGIAS: 1

## 2025-07-29 ASSESSMENT — PAIN DESCRIPTION - DESCRIPTORS: DESCRIPTORS: ACHING

## 2025-07-29 ASSESSMENT — PAIN DESCRIPTION - LOCATION
LOCATION: GENERALIZED
LOCATION: GENERALIZED

## 2025-07-29 NOTE — POST-PROCEDURE NOTE
This is a 55 y.o. female with Hemoglobin SC Disease currently on the chronic exchange program  (Last RCE on 06/19/2025) who underwent inpatient automated red blood cell exchange (RCE with 6 RBC units with target HgbS an HgbC combined of 26% and target HCT 28%.     I saw and evaluated the patient during the RCE.  The patient was resting in bed.  Vascular access functioned well.    WBC   Date/Time Value Ref Range Status   07/29/2025 08:13 AM 12.4 (H) 4.4 - 11.3 x10*3/uL Final     Hemoglobin   Date/Time Value Ref Range Status   07/29/2025 08:13 AM 10.1 (L) 12.0 - 16.0 g/dL Final     Hematocrit   Date/Time Value Ref Range Status   07/29/2025 08:13 AM 32.1 (L) 36.0 - 46.0 % Final     Platelets   Date/Time Value Ref Range Status   07/29/2025 08:13  150 - 450 x10*3/uL Final        POCT Calcium, Ionized   Date/Time Value Ref Range Status   07/28/2025 12:32 PM 1.09 (L) 1.1 - 1.33 mmol/L Final     Comment:     The performance characteristics of ionized calcium tested  in heparinized plasma or serum have been validated by the  individual  laboratory site where testing is performed.   Testing on heparinized plasma or serum is not approved by   the FDA; however, such approval is not necessary.        Hemoglobin S   Date/Time Value Ref Range Status   07/28/2025 12:32 PM 27.0 (H) <=0.0 % Final   07/28/2025 12:32 PM 27.1 (H) <=0.0 % Final        Ferritin   Date/Time Value Ref Range Status   06/10/2025 02:49 PM 23 8 - 150 ng/mL Final        Pre-procedure vital signs at 1240:  T: 36.1 C, HR: 58, RR: 16, BP: 125/69  Pulse oximetry: 98% on 3L nasal cannula    Post-procedure vital signs at 1444:  T: 36 C, HR: 67, RR: 16, BP: 105/62  Pulse oximetry: 95% on 3L nasal cannula    Outcome: RCE completed without complications.  Adverse Reaction: No      Assessment and Plan:  55 y.o. female with Hemoglobin SC Disease who underwent inpatient RCE for recurrent vaso-occlusive Crises    Draw post-procedure labs  Vascular access to be  managed by the clinical team.  Next procedure to be scheduled by the apheresis center in coordination with primary outpatient hematology team.  Tentative next RCE in 4-6 weeks.

## 2025-07-29 NOTE — CARE PLAN
Problem: Pain - Adult  Goal: Verbalizes/displays adequate comfort level or baseline comfort level  Outcome: Progressing     Problem: Safety - Adult  Goal: Free from fall injury  Outcome: Progressing     Problem: Discharge Planning  Goal: Discharge to home or other facility with appropriate resources  Outcome: Progressing     Problem: Chronic Conditions and Co-morbidities  Goal: Patient's chronic conditions and co-morbidity symptoms are monitored and maintained or improved  Outcome: Progressing     Problem: Nutrition  Goal: Nutrient intake appropriate for maintaining nutritional needs  Outcome: Progressing   The patient's goals for the shift include      The clinical goals for the shift include Pt will remain safe in room and use call light during shift on 7/29/25@ 1930    Pt had line placed in IR and transfusion. Pt remains Q2 pain medication. Pt had one episode of emesis. VSS

## 2025-07-29 NOTE — H&P
History Of Present Illness  Senait Narvaez is a 55 y.o. 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 Pt was sent to the ED by her Doc to be evaluated for desatting in 70's on RA at her regular sickle cell appointment. She reports feeling increasingly poor for the past 3 days. CT head in ED is neg for anything acute. Admitted to the [facility] for sickle cell pain management and desaturation.  On admission pt is complaining of generalized typical sickle cell pain. Denies CP,dizziness,SOB, N/V/D/C, fever, chills, abdominal pain, vision changes, HA.     ED Course  T 36.6 °C (97.8 °F)  HR 79  /84  RR 16  O2 (!) 90 % None (Room air)   sCr 1.51  T Bili 4.4  Hgb 9.5  S/p IV dilaudid x3 , IV zofran, IV lasix 20 mg and benadryl  CXR shows pulmonary edema  CT head neg for any acute findings  Past Medical History  She has a past medical history of Asthma, CHF (congestive heart failure), Chronic pain disorder, Compression of vein (02/19/2020), and Hypotension, unspecified (12/10/2020).    Surgical History  She has a past surgical history that includes Appendectomy (08/05/2013); Cholecystectomy (08/05/2013); Other surgical history (05/13/2014); Other surgical history (10/09/2014); Other surgical history (06/09/2014); MR angio head wo IV contrast (8/16/2014); MR angio neck wo IV contrast (8/16/2014); IR CVC tunneled (3/13/2015); IR CVC tunneled (1/29/2016); IR CVC tunneled (1/17/2018); IR CVC tunneled (2/22/2018); IR CVC tunneled (3/22/2018); IR CVC tunneled (4/18/2018); IR CVC tunneled (5/16/2018); IR CVC tunneled (6/12/2018); US guided  biopsy lymph node superficial (9/17/2019); CT guided percutaneous biopsy LYMPH node superficial (9/19/2019); IR CVC tunneled (1/21/2020); IR CVC tunneled (2/28/2020); IR CVC tunneled (4/10/2020); IR CVC tunneled (5/22/2020); IR CVC tunneled (6/19/2020); IR CVC tunneled (7/23/2020); IR CVC tunneled (9/4/2020); IR CVC tunneled (10/9/2020); IR CVC tunneled (11/13/2020); IR CVC tunneled (12/18/2020); IR CVC tunneled (1/22/2021); IR CVC tunneled (2/26/2021); IR CVC tunneled (4/2/2021); IR CVC tunneled (5/7/2021); IR CVC tunneled (6/11/2021); IR CVC tunneled (7/16/2021); IR CVC tunneled (8/20/2021); IR CVC tunneled (9/24/2021); IR CVC tunneled (10/29/2021); IR CVC tunneled (12/3/2021); IR CVC tunneled (1/7/2022); IR CVC tunneled (2/23/2022); IR CVC tunneled (3/25/2022); IR CVC tunneled (4/22/2022); IR CVC tunneled (6/3/2022); IR CVC tunneled (9/18/2017); IR CVC tunneled (10/30/2017); IR CVC tunneled (12/19/2017); IR CVC tunneled (1/6/2023); IR CVC tunneled (2/24/2023); IR CVC tunneled (7/8/2022); IR CVC tunneled (8/12/2022); IR CVC tunneled (9/16/2022); CT angio neck (10/19/2022); IR CVC tunneled (10/20/2022); IR CVC tunneled (11/29/2022); IR CVC tunneled (3/31/2023); IR CVC tunneled (6/9/2023); IR CVC tunneled (7/20/2023); IR CVC tunneled (8/16/2023); IR CVC tunneled (9/21/2023); IR CVC nontunneled (10/26/2023); IR CVC nontunneled (12/7/2023); and Biopsy (9/15/2024).    Oncology History    No history exists.        Social History  She reports that she quit smoking about 11 years ago. Her smoking use included cigarettes. She has been exposed to tobacco smoke. She has never used smokeless tobacco. She reports that she does not currently use alcohol. She reports that she does not currently use drugs.     Allergies  Vancomycin and Oxycontin [oxycodone]    Review of Systems   Constitutional:  Negative for activity change and appetite change.   HENT:  Negative for congestion and rhinorrhea.    Eyes: Negative.    Respiratory:   "Negative for apnea, cough, chest tightness and shortness of breath.    Cardiovascular:  Positive for leg swelling. Negative for palpitations.   Gastrointestinal:  Negative for abdominal distention, abdominal pain, constipation, diarrhea and vomiting.   Genitourinary:  Negative for difficulty urinating and urgency.   Musculoskeletal:  Positive for arthralgias and myalgias.   Skin:  Negative for rash and wound.   Neurological:  Negative for dizziness.   Hematological:  Negative for adenopathy. Does not bruise/bleed easily.   Psychiatric/Behavioral:  Negative for agitation, behavioral problems and confusion.         Physical Exam  Constitutional:       Appearance: She is not ill-appearing.   HENT:      Nose: Nose normal. No congestion or rhinorrhea.      Mouth/Throat:      Mouth: Mucous membranes are moist.     Cardiovascular:      Pulses: Normal pulses.      Heart sounds: Normal heart sounds.   Pulmonary:      Effort: Pulmonary effort is normal. No respiratory distress.      Breath sounds: No rales.   Chest:      Chest wall: No tenderness.   Abdominal:      General: There is no distension.      Tenderness: There is no right CVA tenderness or left CVA tenderness.     Musculoskeletal:      Cervical back: Normal range of motion.      Right lower leg: No edema.      Left lower leg: No edema.     Skin:     General: Skin is warm.     Neurological:      General: No focal deficit present.      Mental Status: She is alert and oriented to person, place, and time. Mental status is at baseline.     Psychiatric:         Mood and Affect: Mood normal.         Behavior: Behavior normal.         Thought Content: Thought content normal.          Last Recorded Vitals  Blood pressure 106/71, pulse 70, temperature 36.7 °C (98 °F), temperature source Oral, resp. rate 12, height 1.651 m (5' 5\"), weight 98.9 kg (218 lb), SpO2 (!) 93%.    Relevant Results  Scheduled medications  Scheduled Medications[1]  Continuous medications  Continuous " Medications[2]  PRN medications  PRN Medications[3]  Results for orders placed or performed during the hospital encounter of 07/28/25 (from the past 24 hours)   Reticulocyte Count   Result Value Ref Range    Retic % 9.3 (H) 0.5 - 2.0 %    Retic Absolute 0.377 (H) 0.018 - 0.083 x10*6/uL    Reticulocyte Hemoglobin 23 (L) 28 - 38 pg    Immature Retic fraction 22.4 (H) <=16.0 %     *Note: Due to a large number of results and/or encounters for the requested time period, some results have not been displayed. A complete set of results can be found in Results Review.     CT head wo IV contrast  Result Date: 7/28/2025  Interpreted By:  Eric Sears and Dulla Kireeti STUDY: CT HEAD WO IV CONTRAST;  7/28/2025 5:20 pm   INDICATION: Signs/Symptoms:new posterior headache.     COMPARISON: CT head from 03/02/2025   ACCESSION NUMBER(S): [id number]   ORDERING CLINICIAN: TINY TURNER   TECHNIQUE: Noncontrast axial CT images of head were obtained with coronal and sagittal reconstructed images.   FINDINGS: BRAIN PARENCHYMA:  No acute intraparenchymal hemorrhage or parenchymal evidence of acute large territory ischemic infarct. Gray-white matter distinction is preserved. No mass-effect.   VENTRICLES and EXTRA-AXIAL SPACES:  No acute extra-axial or intraventricular hemorrhage. No effacement of cerebral sulci. The ventricles and sulci are age-concordant.   PARANASAL SINUSES/MASTOIDS:  No hemorrhage or air-fluid levels within the visualized paranasal sinuses. The mastoids are well aerated.   CALVARIUM/ORBITS:  No skull fracture.  The orbits and globes are intact to the extent visualized. A right-sided glaucoma valve is noted.   EXTRACRANIAL SOFT TISSUES: No discernible acute abnormality.       No evidence acute intracranial hemorrhage or of large territorial infarct.   I personally reviewed the images/study and I agree with the findings as stated by Carline Vickers MD, PGY-2 this study was interpreted at [facility]  [place], [place].   MACRO: None.   Signed by: Eric Sears 7/28/2025 5:39 PM Dictation workstation:   [id number]    XR chest 2 views  Result Date: 7/28/2025  Interpreted By:  Sai Joshi, STUDY: Chest dated  7/28/2025.   INDICATION: Signs/Symptoms:sickle cell crisis   COMPARISON: Chest dated 06/17/2025.   ACCESSION NUMBER(S): [id number]   ORDERING CLINICIAN: TINY TURNER   TECHNIQUE: Two views of the chest.   FINDINGS: There is fullness to the nir and mild prominence of the pulmonary interstitium.  No pneumothorax or effusion is evident. The cardiomediastinal silhouette is  not enlarged.The soft tissues are grossly unremarkable.       Findings compatible with a degree of pulmonary edema. A component of the fullness to the nir could be related to dilated pulmonary arterial tree secondary to pulmonary arterial hypertension.   MACRO: None   Signed by: Sai Joshi 7/28/2025 4:47 PM Dictation workstation:   [id number]         Assessment/Plan   Assessment & Plan  Sickle cell anemia with pain  Senait Narvaez is a 55 y.o. 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 Pt was sent to the ED by her Doc to be evaluated for desatting in 70's on RA at her regular sickle cell appointment. She reports feeling increasingly poor for the past 3 days. CT head in ED is neg for anything acute. Admitted to the [facility] for sickle cell pain management and desaturation.    #Hb SC disease   #Acute on chronic SCD related pain crisis   -Presented to ED with uncontrolled typical sickle cell pain and desaturation and  was placed on 3L of O2.  -Currently on q6 week RBC exchange transfusions.Recent last transfusion was on 6/19. Next exchange transfusions as scheduled  on 7/31/2025.   -[facility] reviewed, last updated 7/28/25: IV Dilaudid 1-1.5 mg every 2-3 hours prn for severe pain  and 0.75-1 mg every 2-3 hrs for moderate pain.  -OARRS reviewed, no aberrant behavior noted    -Hgb BL ~ 8, Hgb 9.5 on admission (7/28) - no indication for simple transfusion   -tbili BL ~ 1.0, 4.4 on admission (7/28)   -LDH ~150-200,  on admission (7/28)   -Start IV dilaudid 2mg q2h PRN for severe pain (7/28-current) and 1 mg IV dilaudid for moderate pain.  -Cyclobenzaprine 10 mg TID PRN    -Bowel regimen for opioid induced constipation with Senna 2tabs nightly and Miralax daily, PO Benadryl 25 mg q6h PRN for opioid-induced pruritus, PO Zofran 8 mg q8h PRN for opioid-induced nausea   -c/w home folic acid 1 mg daily   -Utox pending (7/28)   -Hgb S pending (7/28)   -Exchange labs ordered 7/28  -s/p RBCex on 6/19   -IS encouraged    -ESR/CRP pending   -#New Posterior HA  -CT head is neg in ED    #hx of DVT/PE   #hx of SVC syndrom  -cont home warfarin       #HFpEF   #pHTN   #hx of troponinemia   -Last TTE 5/9/25: HFpEF with EF of 67% right ventricular overloaded, severely enlarged RV    -troponin leak likely iso demand   -on lasix 20 every other day at home   -CXR (7/28)shows pulmonary edema  -s/p 20 mg IV lasix in ED    #hx of SVT   -c/w home Metoprolol tartrate 25 mg BID   -RR was called on the arrival to the floor for BP 80/60 and HR of 170-180. Pt was instructed to bear down and hold and quick response of HR coming down and BP getting much better.  -She was discharged from last hospital admission with holter monitor for palpitations and SVT, which has been controlled with vagal maneuvers.  -s/p EKG showing SVT  -Tele on admit    #Night time hypoxia  -Pt wears 2 L of O2 and uses CPAP at night  -Pt had a episode of significant desaturation while  sleeping overnight on 7/29 am.  Respiratory therapy got her on an Auto Cpap at 4-20 with a 5L bleed in and still having periods of apnea. As per RT, pt might benefit to follow up with sleep and see about switching to BiPap for more support. As per her, she don't know if this is something that can be set up in house. She may just have to follow up when being discharged.     #PPX  -SCD's and Protonix    #Dispo   -Full code  -DC pending on pain control      I [id number] in the professional and overall care of this patient.      Aleja Burch, APRN-CNP         [1] [START ON 7/29/2025] cetirizine, 10 mg, oral, Daily  [START ON 7/29/2025] folic acid, 1 mg, oral, Daily  [START ON 7/29/2025] furosemide, 20 mg, oral, Every other day  [START ON 7/29/2025] metoprolol tartrate, 25 mg, oral, BID  [START ON 7/29/2025] pantoprazole, 40 mg, oral, Daily before breakfast  white petrolatum, 2 Application, Topical, BID  [2]    [3] PRN medications: albuterol, [START ON 7/29/2025] diphenhydrAMINE, HYDROmorphone, ondansetron, sennosides-docusate sodium **OR** polyethylene glycol

## 2025-07-29 NOTE — POST-PROCEDURE NOTE
Interventional Radiology Brief Postprocedure Note    Attending: Baljit Jackson MD    Assistant:   Staff Role   Harjinder Ritter, RN Radiology Nurse   Baljit Jackson MD Radiologist   Silas Ram Radiology Technologist       Diagnosis:   1. Sickle cell anemia with pain            Description of procedure:  Right common femoral venous trialysis catheter placement.    Timeout:  Yes    Procedure Area: Procedure Area     Anesthesia:   Conscious Sedation    Complications: None    Estimated Blood Loss: minimal    Medications (Filter: Administrations occurring from 1125 to 1125 on 07/29/25) As of 07/29/25 1125      None          No specimens collected      See detailed result report with images in PACS.    The patient tolerated the procedure well without incident or complication and is in stable condition.

## 2025-07-29 NOTE — SIGNIFICANT EVENT
Rapid Response Nurse Note: Rapid Response    Pager time:   Arrival time:   Event end time:   Location: [facility]  [] Triage by phone or secure messaging    Rapid response initiated by:  [] Rapid response RN [] Family [] Nursing Supervisor [] Physician   [] RADAR auto page [] Sepsis auto-page [x] RN [] RT   [] NP/PA [] Other:     Primary reason for call:   [] BAT [] New CPAP/BiPAP [] Bleeding [] Change in mental status   [] Chest pain [] Code blue [] FiO2 >/= 50% [] HR </= 40 bpm   [x] HR >/= 130 bpm [] Hyperglycemia [] Hypoglycemia [] RADAR    [] RR </= 8 bpm [] RR >/= 30 bpm [] SBP </= 90 mmHg [] SpO2 < 90%   [] Seizure [] Sepsis [] Shortness of breath  [] Staff concern: see comments     Initial VS and/or RADAR VS: T 35.4 °C; HR 88; RR 18; /73; SPO2 98% on 3 lpm NC    Providers present at bedside (if applicable): BOOGIE Burch-APRN and MD Mahendra (NACR)    Name of ICU Provider contacted (if applicable):     Interventions:  [] None [] ABG/VBG [] Assist w/ICU transfer [] BAT paged    [] Bag mask [] Blood [] Cardioversion [] Code Blue   [] Code blue for intubation [] Code status changed [] Chest x-ray [x] EKG   [] IV fluid/bolus [] KUB x-ray [] Labs/cultures [] Medication   [] Nebulizer treatment [] NIPPV (CPAP/BiPAP) [] Oxygen [] Oral airway   [] Peripheral IV [] Palliative care consult [] CT/MRI [] Sepsis protocol    [] Suctioned [x] Other:  Placed on TELE     Outcome:  [] Coded and  [] Code blue for intubation [] Coded and transferred to ICU []  on division   [x] Remained on division (no change) [] Remained on division + additional monitoring [] Remained in ED [] Transferred to ED   [] Transferred to ICU [] Transferred to inpatient status [] Transferred for interventions (procedure) [] Transferred to ICU stepdown    [] Transferred to surgery [] Transferred to telemetry [] Sepsis protocol [] STEMI protocol   [] Stroke protocol [x] Bedside nurse instructed to page rapid response for any  concerns or acute change in condition/VS     Additional Comments: Rapid response paged by bedside RN for svt with a rate of 188 bpm.  Prior to my arrival patient was asked to bear down by bedside RN with conversion to NSR with a rate down to 88 bpm with the above documented vitals.  Patient is A&Ox3 with a complaint of pain everywhere due to sickle cell.  Team to address pain at this time.  Patient was placed on telemetry monitoring by bedside RN.  No further interventions necessary by rapid response at this time. Bedside RN encouraged to call rapid response with any further concerns.

## 2025-07-29 NOTE — PROGRESS NOTES
Pharmacy Medication History Review    Senait Narvaez is a 55 y.o. female admitted for Sickle cell anemia with pain. Pharmacy reviewed the patient's [id number] medications and allergies for accuracy.    Medications ADDED:  none  Medications CHANGED:  none  Medications REMOVED:   none     The list below reflects the updated PTA list.   Prior to Admission Medications   Prescriptions Informant   albuterol 90 mcg/actuation inhaler Self   Sig: Inhale 2 puffs every 6 hours if needed for wheezing.   ascorbic acid (Vitamin C) 500 mg chewable tablet Self   Sig: Chew 1 tablet (500 mg) once daily.   atropine 1 % ophthalmic solution Self   Sig: Administer 1 drop into the left eye once daily at bedtime. Please continue to administer to Left eye until seen at outpt clinic with ophthalmology this upcoming week   Patient not taking: Reported on 7/29/2025   cyclobenzaprine (Flexeril) 10 mg tablet Self   Sig: Take 1 tablet (10 mg) by mouth 3 times a day as needed for muscle spasms.   Patient not taking: Reported on 7/29/2025   folic acid (Folvite) 1 mg tablet Self   Sig: Take 1 tablet (1 mg) by mouth once daily.   PT states she now gets OTC.    furosemide (Lasix) 20 mg tablet Self   Sig: Take 1 tablet (20 mg) by mouth every other day.   loratadine (Claritin) 10 mg tablet Self   Sig: Take 1 tablet (10 mg) by mouth once daily as needed for allergies.   metoprolol tartrate (Lopressor) 25 mg tablet Self   Sig: Take 1 tablet (25 mg) by mouth 2 times a day.   multivitamin tablet Self   Sig: Take 1 tablet by mouth once daily.   naloxone (Narcan) 4 mg/0.1 mL nasal spray Self   Sig: Administer 1 spray (4 mg) into affected nostril(s) if needed for opioid reversal. May repeat every 2-3 minutes if needed, alternating nostrils, until medical assistance becomes available.   ondansetron (Zofran) 4 mg tablet Self   Sig: Take 1 tablet (4 mg) by mouth every 8 hours if needed for nausea or vomiting.   oxyCODONE (Roxicodone) 10 mg immediate release  "tablet Self   Sig: Take 1 tablet (10 mg) by mouth every 4 hours if needed for severe pain (7 - 10) (pain) for up to 12 days.   oxygen (O2) gas therapy Self   Sig: Inhale 1 each continuously.   phenoL (Chloraseptic) 1.4 % aerosol,spray Self   Sig: Use 1 spray in the mouth or throat every 2 hours if needed for sore throat.   Patient not taking: Reported on 7/29/2025   warfarin (Coumadin) 5 mg tablet Self   Sig: Take 1 tablet (5 mg) by mouth once daily in the evening.   white petrolatum (Aquaphor) 41 % ointment ointment Self   Sig: Apply 2 Applications topically 2 times a day. Apply vaseline to all eroded/denuded areas. Mepilex transfer sheets may be used for areas of friction   Patient not taking: Reported on 7/29/2025      Facility-Administered Medications: None        The list below reflects the updated allergy list. Please review each documented allergy for additional clarification and justification.  Allergies  Reviewed by Yasemin Bryant on 7/29/2025        Severity Reactions Comments    Vancomycin High Rash     Oxycontin [oxycodone] Not Specified Drowsiness             Patient accepts M2B at discharge.     Sources:   Pharmacy dispense history  Patient Interview Moderate historian  Chart Review  Care Everywhere   7/28/25 Office Visit  6/17/25 Previous med rec by Fadi Meza  5/8/25 Previous med rec by Ciaran Betancourt    Additional Comments:  PT was able to confirm PTA list when prompted.      YASEMIN BRYANT  Pharmacy Technician  07/29/25     Secure Chat preferred   If no response call [phone] or [facility] \"Med Rec\"   "

## 2025-07-29 NOTE — ASSESSMENT & PLAN NOTE
Senait Narvaez is a 55 y.o. 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 Pt was sent to the ED by her Doc to be evaluated for desatting in 70's on RA at her regular sickle cell appointment. She reports feeling increasingly poor for the past 3 days. CT head in ED is neg for anything acute. Admitted to the  [facility] for sickle cell pain management and desaturation.

## 2025-07-29 NOTE — SIGNIFICANT EVENT
RAPID RESPONSE NOTE    Reason for Rapid Response:   []    BAT []  New CPAP/BiPAP []  Bleeding []  Change in mental status   []  Chest pain []  Code blue []  FiO2 >/= 50% []  HR </= 40 bpm   [x]  HR >/= 130 bpm []  Hyperglycemia []  Hypoglycemia []  SpO2 < 90%    []  RR </= 8 bpm []  RR >/= 30 bpm []  SBP </= 90 mmHg []  Seizure   []  Staff concern:         Vital Signs on Arrival:   Afebrile, HR 80s, RR 18, /73, 97% on 2L NC    HR noted to be in SVT up to 180s prior to my arrival. Patient instructed to bear down and HR spontaneously improved down to 80s.    Focused Physical Exam:   General: In no acute distress  Cardiac: Regular rate and rhythm  Pulm: on 2L NC, no wheezes, rhonchi, rales appreciated  Abdomen: Nondistended  Extremities: Mild edema of lower extremities  Neuro: A&Ox4    Workup:  EKG performed initially demonstrating SVT, subsequent EKGs demonstrated improved HR to 80s  Prior CXR demonstrating pulmonary edema    Intervention:  - No further intervention at this time as HR has improved. Patient to be placed on telemetry for further monitoring    Working Diagnosis/Impression: SVT    Conclusion:   [x] Patient improved (pertinent vitals HR 80s), will continue to monitor  [] Patient failed to improve (pertinent vitals ), will notify ICU fellow    ICU Notified:   [] Yes  [x] No    Disposition: [RNF] -- VS stable at this time    If staying on floor: Plan for follow up - primary team    Mickey Mccray MD  PGY-3 Internal Medicine/NACR

## 2025-07-29 NOTE — PRE-PROCEDURE NOTE
Interventional Radiology Preprocedure Note    Indication for procedure: The encounter diagnosis was Sickle cell anemia with pain.    Relevant review of systems: NA    Relevant Labs:   Lab Results   Component Value Date    CREATININE 1.51 (H) 07/28/2025    EGFR 40 (L) 07/28/2025    INR 2.9 (H) 07/29/2025    PROTIME 32.2 (H) 07/29/2025       Planned Sedation/Anesthesia: Moderate    Airway assessment: normal    Directed physical examination:    Normal WOB on room air  No acute distress  Alert and oriented    Mallampati: II (hard and soft palate, upper portion of tonsils and uvula visible)    ASA Score: ASA 2 - Patient with mild systemic disease with no functional limitations    Benefits, risks and alternatives of procedure and planned sedation have been discussed with the patient and/or their representative. All questions answered and they agree to proceed.       Reviewed and approved by JEAN HELMS on 7/29/25 at 10:41 AM.

## 2025-07-29 NOTE — SIGNIFICANT EVENT
Rapid Response Respiratory Therapy Note: Rapid Response    Start Time: 2309  End Time: 2320  Location: [facility]    Initial Vital Signs and O2: HR: 88    RR: 20     Type of O2: 3L NC    SpO2: 98%  Breath Sounds: Clear/Diminished     Respiratory Concerns:  [x]  None []  Increased WOB []  Shallow respirations []  Irregular Respirations   []  Tachypnea []  Bradypnea []  Oxygen desaturation []  Cyanosis   []  Poor Secretion Clearance []  Impaired/Weak Cough []  Copious Secretions []  Thick Secretions   []  Inability to Protect Airway []  Apnea []  Respiratory Arrest []  Shortness of Breath   [] Hemodynamic Instability []  Asymmetric Chest Rise []  Adventitious Breath Sounds []  Abnormal CXR   []  Aspiration []  Other:       Interventions:  [x]  None []  ABG/VBG  []  Assist w/ICU transfer []  Bag-mask ventilation   []  Bronchial Hygiene []  Trach care/Suction []  ETCO2 []  CPR   []  Assist Intubation []  Venti Mask/NRB []  Chest x-ray []  CT/MRI transport    []  High Flow Therapy []  Igel  []  Nasal Airway []  NT Suctioning   []  Nebulizer treatment []  NIPPV (CPAP/BiPAP) []  Oxygen  Type:  FiO2:  Liter Flow:  SpO2:  []  Oral Airway   []  Oral Suctioning []  Other:      Outcome:  [x]  Maintain on Division []  Transferred to ICU []  Transferred to ED [x]  Bedside nurse instructed to page Rapid Response for any concerns or acute change in condition/VS     Final Vital Signs and O2: HR:    RR:    Type of O2:     SpO2:    Breath Sounds:     Additional Comments:  Pt with run of SVT that reverted to NSR when pt was asked to bear down. Pt is sickle cell and has complaints of all over body pain. Team to address. Pt placed on telemetry and RN encouraged to reach out to RR with any further concerns.

## 2025-07-29 NOTE — CONSULTS
"Reason for consult:   Red Cell Exchange (RCE) for suspected Sickle cell disease, non-acute: Recurrent Vaso-occlusive Crises (ASFA 2023: Category II, Grade 2B).    HPI:   Senait Narvaez is a 55 y.o. female with HbSC disease on routine exchange transfusions. Her most recent transfusion was on 6/19. Patient reported to the emergency department on 7/28 with diffuse body pain and desaturations on room air (70's). CT head was negative for acute injury/stroke. She was admitted to [facility] for pain management and desaturation.     Transfusion Medicine was consulted for a RCE procedure to help with further management.    Past medical history:   Medical History[1]     Past surgical history:  Surgical History[2]     Allergies:   RX Allergies[3]    ROS (limited):  ROS per chart.    Medications:  Current Medications[4]     Vitals:  Visit Vitals  /77 (BP Location: Left arm, Patient Position: Lying)   Pulse 81   Temp 36.1 °C (97 °F) (Temporal)   Resp 16   Ht 1.651 m (5' 5\")   Wt 98.9 kg (218 lb)   SpO2 91%   BMI 36.28 kg/m²   OB Status Postmenopausal   Smoking Status Former   BSA 2.13 m²        Labs:  WBC   Date/Time Value Ref Range Status   07/28/2025 12:32 PM 10.8 4.4 - 11.3 x10*3/uL Final     Hemoglobin   Date/Time Value Ref Range Status   07/28/2025 12:32 PM 9.5 (L) 12.0 - 16.0 g/dL Final     Hematocrit   Date/Time Value Ref Range Status   07/28/2025 12:32 PM 29.3 (L) 36.0 - 46.0 % Final     Platelets   Date/Time Value Ref Range Status   07/28/2025 12:32  150 - 450 x10*3/uL Final        POCT Calcium, Ionized   Date/Time Value Ref Range Status   07/28/2025 12:32 PM 1.09 (L) 1.1 - 1.33 mmol/L Final     Comment:     The performance characteristics of ionized calcium tested  in heparinized plasma or serum have been validated by the  individual  laboratory site where testing is performed.   Testing on heparinized plasma or serum is not approved by   the FDA; however, such approval is not necessary.        Hemoglobin S "   Date/Time Value Ref Range Status   07/28/2025 12:32 PM 27.0 (H) <=0.0 % Final   07/28/2025 12:32 PM 27.1 (H) <=0.0 % Final        Ferritin   Date/Time Value Ref Range Status   06/10/2025 02:49 PM 23 8 - 150 ng/mL Final        Assessment/Plan:  55 y.o. female with suspected Sickle cell disease, non-acute: Recurrent Vaso-occlusive Crises (ASFA 2023: Category II, Grade 2B)  1. Explained the procedure and obtained consent from the patient.  2. Vascular access to be maintained by clinical team.  3. RCE scheduled for 7/29/2025.          [1]   Past Medical History:  Diagnosis Date    Asthma     CHF (congestive heart failure)     Chronic pain disorder     Compression of vein 02/19/2020    Superior vena cava syndrome    Hypotension, unspecified 12/10/2020   [2]   Past Surgical History:  Procedure Laterality Date    APPENDECTOMY  08/05/2013    Appendectomy    BIOPSY  9/15/2024    CHOLECYSTECTOMY  08/05/2013    Cholecystectomy    CT ANGIO NECK  10/19/2022    CT NECK ANGIO W AND WO IV CONTRAST 10/19/2022 [facility] [place] CLINICAL LEGACY    CT GUIDED PERCUTANEOUS BIOPSY LYMPH NODE SUPERFICIAL  9/19/2019    CT GUIDED PERCUTANEOUS BIOPSY LYMPH NODE SUPERFICIAL 9/19/2019 [facility] [place]    IR CVC NONTUNNELED  10/26/2023    IR CVC NONTUNNELED 10/26/2023 [facility] [place] ANGIO    IR CVC NONTUNNELED  12/7/2023    IR CVC NONTUNNELED 12/7/2023 Marcos Moser MD [facility] [place] ANGIO    IR CVC TUNNELED  3/13/2015    IR CVC TUNNELED 3/13/2015 [facility] [place] CLINICAL LEGACY    IR CVC TUNNELED  1/29/2016    IR CVC TUNNELED 1/29/2016 [facility] [place]GAC[facility]    IR CVC TUNNELED  1/17/2018    IR CVC TUNNELED 1/17/2018 [facility] [place] AIB LEGACY    IR CVC TUNNELED  2/22/2018    IR CVC TUNNELED 2/22/2018 [facility] [place] AIB LEGACY    IR CVC TUNNELED  3/22/2018    IR CVC TUNNELED 3/22/2018 [facility] [place] CLINICAL LEGACY    IR CVC TUNNELED  4/18/2018    IR CVC TUNNELED 4/18/2018 [facility] [place]    IR CVC TUNNELED  5/16/2018    IR CVC TUNNELED 5/16/2018 [facility] AIB LEGACY    IR CVC TUNNELED  6/12/2018    IR CVC TUNNELED  6/12/2018 [facility] [place] AIB LEGACY    IR CVC TUNNELED  1/21/2020    IR CVC TUNNELED 1/21/2020 [facility] INPATIENT LEGACY    IR CVC TUNNELED  2/28/2020    IR CVC TUNNELED 2/28/2020 [facility] [place] ANCILLARY LEGACY    IR CVC TUNNELED  4/10/2020    IR CVC TUNNELED 4/10/2020 [facility] [place] AIB LEGACY    IR CVC TUNNELED  5/22/2020    IR CVC TUNNELED 5/22/2020 [facility] [place] ANCILLARY LEGACY    IR CVC TUNNELED  6/19/2020    IR CVC TUNNELED 6/19/2020 [facility] [place] AIB LEGACY    IR CVC TUNNELED  7/23/2020    IR CVC TUNNELED 7/23/2020 [facility] [place] AIB LEGACY    IR CVC TUNNELED  9/4/2020    IR CVC TUNNELED 9/4/2020 [facility] [place] AIB LEGACY    IR CVC TUNNELED  10/9/2020    IR CVC TUNNELED 10/9/2020 [facility] [place] ANCILLARY LEGACY    IR CVC TUNNELED  11/13/2020    IR CVC TUNNELED 11/13/2020 [facility] [place] AIB LEGACY    IR CVC TUNNELED  12/18/2020    IR CVC TUNNELED 12/18/2020 [facility] [place] AIB LEGACY    IR CVC TUNNELED  1/22/2021    IR CVC TUNNELED 1/22/2021 [facility] [place] AIB LEGACY    IR CVC TUNNELED  2/26/2021    IR CVC TUNNELED 2/26/2021 [facility] CLINICAL LEGACY    IR CVC TUNNELED  4/2/2021    IR CVC TUNNELED 4/2/2021 [facility] [place] AIB LEGACY    IR CVC TUNNELED  5/7/2021    IR CVC TUNNELED 5/7/2021 [facility] [place] ANCILLARY LEGACY    IR CVC TUNNELED  6/11/2021    IR CVC TUNNELED 6/11/2021 [facility] [place] AIB LEGACY    IR CVC TUNNELED  7/16/2021    IR CVC TUNNELED 7/16/2021 [facility] [place] ANCILLARY LEGACY    IR CVC TUNNELED  8/20/2021    IR CVC TUNNELED 8/20/2021 [facility] [place] AIB LEGACY    IR CVC TUNNELED  9/24/2021    IR CVC TUNNELED 9/24/2021 [facility] [place] AIB LEGACY    IR CVC TUNNELED  10/29/2021    IR CVC TUNNELED 10/29/2021 [facility] [place] AIB LEGACY    IR CVC TUNNELED  12/3/2021    IR CVC TUNNELED 12/3/2021 [facility] AIB LEGACY    IR CVC TUNNELED  1/7/2022    IR CVC TUNNELED 1/7/2022 [facility] ANCILLARY LEGACY    IR CVC TUNNELED  2/23/2022    IR CVC TUNNELED 2/23/2022 [facility] CLINICAL LEGACY    IR CVC TUNNELED  3/25/2022    IR CVC TUNNELED 3/25/2022 [facility] ANCILLARY LEGACY    IR CVC TUNNELED  4/22/2022    IR CVC TUNNELED 4/22/2022 [facility] ANCILLARY LEGACY    IR CVC TUNNELED  6/3/2022    IR CVC TUNNELED 6/3/2022 [facility] ANCILLARY LEGACY    IR  CVC TUNNELED  9/18/2017    IR CVC TUNNELED 9/18/2017 [facility] AIB LEGACY    IR CVC TUNNELED  10/30/2017    IR CVC TUNNELED 10/30/2017 [facility] AIB LEGACY    IR CVC TUNNELED  12/19/2017    IR CVC TUNNELED 12/19/2017 [facility] AIB LEGACY    IR CVC TUNNELED  1/6/2023    IR CVC TUNNELED 1/6/2023 [facility]ICE LEGACY    IR CVC TUNNELED  2/24/2023    IR CVC TUNNELED [facility] ANGIO    IR CVC TUNNELED  7/8/2022    IR CVC TUNNELED 7/8/2022 [facility] ANCILLARY LEGACY    IR CVC TUNNELED  8/12/2022    IR CVC TUNNELED 8/12/2022 [facility] CLINICAL LEGACY    IR CVC TUNNELED  9/16/2022    IR CVC TUNNELED 9/16/2022 [facility] ANCILLARY LEGACY    IR CVC TUNNELED  10/20/2022    IR CVC TUNNELED 10/20/2022 [facility] CLINICAL LEGACY    IR CVC TUNNELED  11/29/2022    IR CVC TUNNELED 11/29/2022 [facility] ANCILLARY LEGACY    IR CVC TUNNELED  3/31/2023    IR CVC TUNNELED [facility] ANGIO    IR CVC TUNNELED  6/9/2023    IR CVC TUNNELED 6/9/2023 [facility] ANGIO    IR CVC TUNNELED  7/20/2023    IR CVC TUNNELED 7/20/2023 [facility] ANGIO    IR CVC TUNNELED  8/16/2023    IR CVC TUNNELED 8/16/2023 [facility] ANGIO    IR CVC TUNNELED  9/21/2023    IR CVC TUNNELED 9/21/2023 [facility] ANGIO    MR HEAD ANGIO WO IV CONTRAST  8/16/2014    MR HEAD ANGIO WO IV CONTRAST 8/16/2014 [facility] ANCILLARY LEGACY    MR NECK ANGIO WO IV CONTRAST  8/16/2014    MR NECK ANGIO WO IV CONTRAST 8/16/2014 [facility] ANCILLARY LEGACY    OTHER SURGICAL HISTORY  05/13/2014    Fluid Drained, Retina Inspected: Breaks Supported By Buckle    OTHER SURGICAL HISTORY  10/09/2014    Closed Treatment Of Fracture Of The Lateral Malleolus    OTHER SURGICAL HISTORY  06/09/2014    Salpingo-oophorectomy Right Side    US GUIDED BIOPSY LYMPH NODE SUPERFICIAL  9/17/2019    US GUIDED BIOPSY LYMPH NODE SUPERFICIAL 9/17/2019 [facility] [place] INPATIENT LEGACY   [3]   Allergies  Allergen Reactions    Vancomycin Rash    Oxycontin [Oxycodone] Drowsiness   [4]   Current Facility-Administered Medications:     albuterol 90 mcg/actuation inhaler 2 puff, 2 puff, inhalation, q6h PRN, Aleja Burch, APRN-CNP     cetirizine (ZyrTEC) tablet 10 mg, 10 mg, oral, Daily, Aleja N Uddin, APRN-CNP, 10 mg at 07/29/25 0926    cyclobenzaprine (Flexeril) tablet 10 mg, 10 mg, oral, TID PRN, Aleja N Uddin, APRN-CNP    diphenhydrAMINE (BENADryl) capsule 25 mg, 25 mg, oral, q6h PRN, Aleja N Uddin, APRN-CNP    folic acid (Folvite) tablet 1 mg, 1 mg, oral, Daily, Aleja N Uddin, APRN-CNP, 1 mg at 07/29/25 0926    furosemide (Lasix) tablet 20 mg, 20 mg, oral, Every other day, Aleja N Uddin, APRN-CNP, 20 mg at 07/29/25 0926    HYDROmorphone (Dilaudid) injection 1 mg, 1 mg, intravenous, q2h PRN, Aleja N Uddin, APRN-CNP, 1 mg at 07/29/25 0926    HYDROmorphone PF (Dilaudid) injection 2 mg, 2 mg, intravenous, q2h PRN, Aleja N Uddin, APRN-CNP, 2 mg at 07/29/25 0116    metoprolol tartrate (Lopressor) tablet 25 mg, 25 mg, oral, BID, Aleja N Uddin, APRN-CNP, 25 mg at 07/29/25 0926    ondansetron (Zofran) tablet 8 mg, 8 mg, oral, q8h PRN, Aleja N Uddin, APRN-CNP, 8 mg at 07/29/25 0443    oxygen (O2) therapy, 1 Dose, inhalation, Continuous - O2/gases, Aleja N Uddin, APRN-CNP    pantoprazole (ProtoNix) EC tablet 40 mg, 40 mg, oral, Daily before breakfast, Aleja N Uddin, APRN-CNP, 40 mg at 07/29/25 0651    polyethylene glycol (Glycolax, Miralax) packet 17 g, 17 g, oral, Daily, Renetta Hunt, APRN-CNP    sennosides-docusate sodium (Nita-Colace) 8.6-50 mg per tablet 2 tablet, 2 tablet, oral, BID, Renetta Hunt, APRN-CNP, 2 tablet at 07/29/25 0927    [Held by provider] warfarin (Coumadin) tablet 5 mg, 5 mg, oral, Daily, Aleja Burch APRN-CNP, 5 mg at 07/29/25 0116    white petrolatum (Aquaphor) ointment 2 Application, 2 Application, Topical, BID, Aleja Burch APRN-CNP, 2 Application at 07/29/25 0927

## 2025-07-29 NOTE — PROGRESS NOTES
Senait Patten [id number]       Procedure type: Red cell Exchange    Replacement Fluids: 6  units of PRBC used for procedure (RBCX)     Procedure Completed Without complications.    Attending notified of completion status. See flow sheet(s) for additional details.  Post-Vitals listed below.    Post-procedure vitals:  1444  BP: 105/62  Temp: 36 °C (96.8 °F)  Heart Rate: 67  Resp: 16  SpO2: 95 % 3LNC       DIAMOND MCALLISTER RN

## 2025-07-29 NOTE — ASSESSMENT & PLAN NOTE
Senait Narvaez is a 55 y.o. 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 She was placed on 3L NC in ED. She reported sickle cell pain and HA--> CT head (7/28) negative. CXR (7/28) pulm edema, fullness to the hilia could be r/t dilated pulm arterial tree 2/2 pulm HTN. She was admitted for further management of hypoxia and pain. Hgb and lysis labs stable. Started on IV dilaudid for pain management. Pt went into symptomatic (hypotensive) SVT over night 7/28-7/29, improved with vagal maneuvers. She was due for outpt RBCEx 7/31, now planning for today 7/29; IR consulted with plan for apheresis line placement and RBCEx today 7/29. DC pending improvement in pain and RBCEx.    Updates 7/29:  - IR consulted, plan for apheresis line placement today and RBCEx with TM  - Symptomatic (hypotensive) SVT over night 7/28-7/29, improved with vagal maneuvers  - Continue current pain reg    # Hb SC disease with acute on chronic pain  - Presented to ED with uncontrolled typical sickle cell pain and desaturation and was placed on 3L of O2  -Currently on q6 week RBC exchange transfusions. Last transfusion was on 6/19. Next exchange transfusions as scheduled  on 7/31/2025--> IR consulted, plan for apheresis line placement and routine RBCEx today 7/29 while inpt  - Carepath reviewed, last updated 7/28/25: IV Dilaudid 1-1.5 mg every 2-3 hours prn for severe pain  and 0.75-1 mg every 2-3 hrs for moderate pain.  - OARRS reviewed, no aberrant behavior noted    - Hgb BL ~8, Hgb 9.5 on admission (7/28)--> 10.1 (7/29)- no indication for simple transfusion   - Tbili  BL ~ 1.0,Tbili 4.4 on admission (7/28)--> 3.0 (7/29)  - LDH ~150-200,  on admission (7/28)--> 323 (7/29)  - CXR (7/28): pulm edema, fullness to the hilia could be r/t dilated pulm arterial tree 2/2 pulm HTN  - CT head (7/28): negative for acute process  - Started IV dilaudid 1mg q2hrs PRN moderate pain and IV dilaudid 2mg q2hrs PRN for severe pain (7/28- current) per care plan  - Continue Folic Acid 1mg daily and Cyclobenzaprine 10mg TID PRN muscle spasms  - Bowel regimen for opioid induced constipation with DocuSenna 2tabs BID and Miralax daily  - PO Benadryl 25 mg q6h PRN for opioid-induced pruritus, PO Zofran 8 mg q8h PRN for opioid-induced nausea   - Utox (7/28): pending  - Hgb S (7/28): 27.1%  - Exchange labs ordered 7/28  - CRP 4.87/ESR 20 (7/29)  - IS encouraged      # Acute SVT with hx SVT  - Over night 7/28-7/29 pt with episode of SVT HR 180s, improved spontaneously with vagal maneuvers  - EKG (7/28): showing SVT  - Repeat EKG pending 7/29  - Pt with hx of SVT and follows with cardiology  - Continue home Metop Tartrate 25mg BID  - She was discharged from last hospital admission with holter monitor for palpitations and SVT, which has been controlled with vagal maneuvers  - Continue telemetry monitoring  - Cardiology FUV 8/25     # HFpEF   # pHTN   # Hx of troponinemia   - Last TTE 5/9/25: HFpEF with EF of 67% right ventricular overloaded, severely enlarged RV    - Troponin leak likely iso demand   - Continue Lasix 20mg every other day  - CXR (7/28): Pulm edema, fullness to the hilia could be r/t dilated pulm arterial tree 2/2 pulm HTN  - s/p 20 mg IV lasix in ED  - Will give IV Lasix PRN in addition to home PO dosing every other day    # Hx of DVT/PE   # Hx of SVC syndrom  - Continue home Warfarin 5mg daily  - INR 2.9 (7/29)  - Daily INRs    # CKD II  - sCr BL fluctuates but ~1; sCr 1.73 (7/29)--> will monitor today as pt gets over loaded very quickly  - UA/Urine lytes ordered/pending 7/29    # Known  L Breast Mass  - Saw Gracy Barr PA-C outpt on 1/17/25  - Imaging showing likely benign L breast mass with plan for repeat b/l mammogram around 7/29/25  - Planned for repeat imaging 7/30 with surg onc FUV 8/1  -  for breast center to see if she can go down for imaging while admitted or if it needs to be rescheduled    # CAN  # Supplemental O2 use  - Continue home 2L NC and CPAP at night  - Currently on 3L NC as of 7/29     DVT Prophy: Therapeutic Warfarin, SCDs, encourage safe ambulation     DISPO:  - Full code  - DC pending improvement in pain and RBCEx  - Diagnostic breast imaging 7/30 (may need to be rescheduled if they can't take her down while she's admitted), Surg Onc FUV 8/1 (may need to be rescheduled if she's still admitted), Ophthalmology FUV 8/6, Cardiology FUV 8/25

## 2025-07-29 NOTE — ED NOTES
Report called to [facility], given to JERICA.      Nicolás Ho, JERICA  07/28/25 0431

## 2025-07-29 NOTE — PROGRESS NOTES
"Senait Narvaez is a 55 y.o. female on day 1 of admission presenting with Sickle cell anemia with pain.    Subjective   Seen this AM at the bedside. Pt states that her pain is \"all over\". Doesn't note any specific place that is worse than another. Reports her pain as a 9/10. Pain meds are somewhat helpful. We discussed plan for RBCEx while inpt. She had an episode of SVT over night that resolved with vagal maneuvers. No further episodes. She otherwise denies any fevers/chills, SOB, or CP.    Objective     Physical Exam  Vitals reviewed.   Constitutional:       Appearance: Normal appearance. She is obese.   HENT:      Head: Normocephalic and atraumatic.      Nose: Nose normal.      Mouth/Throat:      Mouth: Mucous membranes are moist.      Pharynx: Oropharynx is clear.     Eyes:      Extraocular Movements: Extraocular movements intact.      Pupils: Pupils are equal, round, and reactive to light.       Cardiovascular:      Rate and Rhythm: Normal rate and regular rhythm.      Pulses: Normal pulses.      Heart sounds: Normal heart sounds.   Pulmonary:      Effort: Pulmonary effort is normal.      Breath sounds: Normal breath sounds.   Abdominal:      General: Bowel sounds are normal.      Palpations: Abdomen is soft.     Musculoskeletal:         General: Normal range of motion.     Skin:     General: Skin is warm.     Neurological:      General: No focal deficit present.      Mental Status: She is alert and oriented to person, place, and time. Mental status is at baseline.     Psychiatric:         Mood and Affect: Mood normal.         Behavior: Behavior normal.         Last Recorded Vitals  Blood pressure 118/77, pulse 81, temperature 36.1 °C (97 °F), temperature source Temporal, resp. rate 16, height 1.651 m (5' 5\"), weight 98.9 kg (218 lb), SpO2 91%.  Intake/Output last 3 Shifts:  No intake/output data recorded.    Relevant Results  Results for orders placed or performed during the hospital encounter of 07/28/25 (from " the past 24 hours)   Reticulocyte Count   Result Value Ref Range    Retic % 9.3 (H) 0.5 - 2.0 %    Retic Absolute 0.377 (H) 0.018 - 0.083 x10*6/uL    Reticulocyte Hemoglobin 23 (L) 28 - 38 pg    Immature Retic fraction 22.4 (H) <=16.0 %   Hemoglobin Identification with Path Review   Result Value Ref Range    Hemoglobin A 42.3 (L) 95.8 - 98.0 %    Hemoglobin F 0.5 0.0 - 2.0 %    Hemoglobin S 27.1 (H) <=0.0 %    Hemoglobin C 26.6 (H) <=0.0 %    Hemoglobin A2 3.5 2.0 - 3.5 %    Hemoglobin Identification Interpretation See Below (A) Normal    Pathologist Review-Hemoglobin Identification       Electronically signed out by Luis Hernandez DO on 7/29/25 at 9:29 AM.  By the signature on this report, the individual or group listed as making the Final Interpretation/Diagnosis certifies that they have reviewed this case.   ECG 12 lead   Result Value Ref Range    Ventricular Rate 77 BPM    Atrial Rate 77 BPM    MN Interval 150 ms    QRS Duration 78 ms    QT Interval 400 ms    QTC Calculation(Bazett) 452 ms    P Axis 79 degrees    R Axis 138 degrees    T Axis 113 degrees    QRS Count 13 beats    Q Onset 212 ms    P Onset 137 ms    P Offset 170 ms    T Offset 412 ms    QTC Fredericia 434 ms   ECG 12 lead   Result Value Ref Range    Ventricular Rate 88 BPM    Atrial Rate 88 BPM    MN Interval 154 ms    QRS Duration 82 ms    QT Interval 370 ms    QTC Calculation(Bazett) 447 ms    P Axis 67 degrees    R Axis 144 degrees    T Axis 121 degrees    QRS Count 14 beats    Q Onset 214 ms    P Onset 137 ms    P Offset 181 ms    T Offset 399 ms    QTC Fredericia 420 ms     *Note: Due to a large number of results and/or encounters for the requested time period, some results have not been displayed. A complete set of results can be found in Results Review.     Scheduled medications  Scheduled Medications[1]  Continuous medications  Continuous Medications[2]  PRN medications  PRN Medications[3]  Assessment & Plan  Sickle cell anemia with  lukas Narvaez is a 55 y.o. 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 She was placed on 3L NC in ED. She reported sickle cell pain and HA--> CT head (7/28) negative. CXR (7/28) pulm edema, fullness to the hilia could be r/t dilated pulm arterial tree 2/2 pulm HTN. She was admitted for further management of hypoxia and pain. Hgb and lysis labs stable. Started on IV dilaudid for pain management. Pt went into symptomatic (hypotensive) SVT over night 7/28-7/29, improved with vagal maneuvers. She was due for outpt RBCEx 7/31, now planning for today 7/29; IR consulted with plan for apheresis line placement and RBCEx today 7/29. DC pending improvement in pain and RBCEx.    Updates 7/29:  - IR consulted, plan for apheresis line placement today and RBCEx with TM  - Symptomatic (hypotensive) SVT over night 7/28-7/29, improved with vagal maneuvers  - Continue current pain reg    # Hb SC disease with acute on chronic pain  - Presented to ED with uncontrolled typical sickle cell pain and desaturation and was placed on 3L of O2  -Currently on q6 week RBC exchange transfusions. Last transfusion was on 6/19. Next exchange transfusions as scheduled  on 7/31/2025--> IR consulted, plan for apheresis line placement and routine RBCEx today 7/29 while inpt  - Carepath reviewed, last updated 7/28/25: IV Dilaudid 1-1.5 mg every 2-3 hours prn for severe pain  and 0.75-1 mg every 2-3 hrs for moderate pain.  - OARRS reviewed, no aberrant behavior noted    - Hgb BL ~8, Hgb 9.5 on admission (7/28)--> 10.1 (7/29)- no indication for simple transfusion   -  Tbili BL ~ 1.0,Tbili 4.4 on admission (7/28)--> 3.0 (7/29)  - LDH ~150-200,  on admission (7/28)--> 323 (7/29)  - CXR (7/28): pulm edema, fullness to the hilia could be r/t dilated pulm arterial tree 2/2 pulm HTN  - CT head (7/28): negative for acute process  - Started IV dilaudid 1mg q2hrs PRN moderate pain and IV dilaudid 2mg q2hrs PRN for severe pain (7/28- current) per care plan  - Continue Folic Acid 1mg daily and Cyclobenzaprine 10mg TID PRN muscle spasms  - Bowel regimen for opioid induced constipation with DocuSenna 2tabs BID and Miralax daily  - PO Benadryl 25 mg q6h PRN for opioid-induced pruritus, PO Zofran 8 mg q8h PRN for opioid-induced nausea   - Utox (7/28): pending  - Hgb S (7/28): 27.1%  - Exchange labs ordered 7/28  - CRP 4.87/ESR 20 (7/29)  - IS encouraged      # Acute SVT with hx SVT  - Over night 7/28-7/29 pt with episode of SVT HR 180s, improved spontaneously with vagal maneuvers  - EKG (7/28): showing SVT  - Repeat EKG pending 7/29  - Pt with hx of SVT and follows with cardiology  - Continue home Metop Tartrate 25mg BID  - She was discharged from last hospital admission with holter monitor for palpitations and SVT, which has been controlled with vagal maneuvers  - Continue telemetry monitoring  - Cardiology FUV 8/25     # HFpEF   # pHTN   # Hx of troponinemia   - Last TTE 5/9/25: HFpEF with EF of 67% right ventricular overloaded, severely enlarged RV    - Troponin leak likely iso demand   - Continue Lasix 20mg every other day  - CXR (7/28): Pulm edema, fullness to the hilia could be r/t dilated pulm arterial tree 2/2 pulm HTN  - s/p 20 mg IV lasix in ED  - Will give IV Lasix PRN in addition to home PO dosing every other day    # Hx of DVT/PE   # Hx of SVC syndrom  - Continue home Warfarin 5mg daily  - INR 2.9 (7/29)  - Daily INRs    # CKD II  - sCr BL fluctuates but ~1; sCr 1.73 (7/29)--> will monitor today as pt gets over loaded very quickly  - UA/Urine lytes ordered/pending 7/29    #  Known L Breast Mass  - Saw Gracy Barr PA-C outpt on 1/17/25  - Imaging showing likely benign L breast mass with plan for repeat b/l mammogram around 7/29/25  - Planned for repeat imaging 7/30 with surg onc FUV 8/1  -  for breast center to see if she can go down for imaging while admitted or if it needs to be rescheduled    # CAN  # Supplemental O2 use  - Continue home 2L NC and CPAP at night  - Currently on 3L NC as of 7/29     DVT Prophy: Therapeutic Warfarin, SCDs, encourage safe ambulation     DISPO:  - Full code  - DC pending improvement in pain and RBCEx  - Diagnostic breast imaging 7/30 (may need to be rescheduled if they can't take her down while she's admitted), Surg Onc FUV 8/1 (may need to be rescheduled if she's still admitted), Ophthalmology FUV 8/6, Cardiology FUV 8/25    I spent >60 minutes in the professional and overall care of this patient    Assessment and plan as above discussed with attending physician Dr. Shala Hunt, APRN-CNP         [1] cetirizine, 10 mg, oral, Daily  folic acid, 1 mg, oral, Daily  furosemide, 20 mg, oral, Every other day  metoprolol tartrate, 25 mg, oral, BID  pantoprazole, 40 mg, oral, Daily before breakfast  polyethylene glycol, 17 g, oral, Daily  sennosides-docusate sodium, 2 tablet, oral, BID  [Held by provider] warfarin, 5 mg, oral, Daily  white petrolatum, 2 Application, Topical, BID  [2]    [3] PRN medications: albuterol, cyclobenzaprine, diphenhydrAMINE, HYDROmorphone, HYDROmorphone, ondansetron, oxygen

## 2025-07-29 NOTE — CARE PLAN
The patient's goals for the shift include  rest and comfort    The clinical goals for the shift include patient will remain hds

## 2025-07-29 NOTE — PROGRESS NOTES
07/29/25 0900   Discharge Planning   Living Arrangements Alone   Support Systems Children;Parent;Family members   Type of Residence Private residence   Home or Post Acute Services In home services   Type of Home Care Services DME or oxygen   Expected Discharge Disposition Home   Does the patient need discharge transport arranged? Yes   [place] coordination needed? Yes   Has discharge transport been arranged? No   Transportation Needs   In the past 12 months, has lack of transportation kept you from medical appointments or from getting medications? yes   In the past 12 months, has lack of transportation kept you from meetings, work, or from getting things needed for daily living? Yes   Patient Choice   Patient / Family choosing to utilize agency / facility established prior to hospitalization Yes  (pt active with [facility] for nocturnal O2)     Care Transitions Note  07/29/25  Plan per Medical/Surgical Team: sickle cell pain crisis, SBVT, labs okay, poss exchange?  Status: Inpatient  Payor Source: [employer] Dual Complete primary and secondary  Discharge disposition: Home  Expected date of discharge: end of week?  Barriers: pain mgmt  PCP / Primary Oncologist: Anim  DME: rollator PRN  O2: active with [facility] nocturnal O2    Patient admitted for sickle cell pain crisis. Pt is independent with care at baseline. Normally lives by herself in an apartment. She is well supported by family, including her mother and daughter. She is not active with HC at this time but she had [facility] in the past. No anticipated dc needs at this time. SW to follow.   Bouchra Stout, MSW, LSW

## 2025-07-29 NOTE — PROGRESS NOTES
Senait Narvaez female   1970 55 y.o.   [id number]      Chief Complaint  ***    History Of Present Illness  Senait Narvaez is a 55 y.o. female presenting with ***. She denies breast biopsy or surgery. She denies family history breast cancer ***.    BREAST IMAGING:    REPRODUCTIVE HISTORY: menarche age, GP, first birth age, , OCP's, premenopausal, LMP,  no HRT, breast tissue                                   FAMILY CANCER HISTORY:      Surgical History  She has a past surgical history that includes Appendectomy (08/05/2013); Cholecystectomy (08/05/2013); Other surgical history (05/13/2014); Other surgical history (10/09/2014); Other surgical history (06/09/2014); MR angio head wo IV contrast (8/16/2014); MR angio neck wo IV contrast (8/16/2014); IR CVC tunneled (3/13/2015); IR CVC tunneled (1/29/2016); IR CVC tunneled (1/17/2018); IR CVC tunneled (2/22/2018); IR CVC tunneled (3/22/2018); IR CVC tunneled (4/18/2018); IR CVC tunneled (5/16/2018); IR CVC tunneled (6/12/2018); US guided biopsy lymph node superficial (9/17/2019); CT guided percutaneous biopsy LYMPH node superficial (9/19/2019); IR CVC tunneled (1/21/2020); IR CVC tunneled (2/28/2020); IR CVC tunneled (4/10/2020); IR CVC tunneled (5/22/2020); IR CVC tunneled (6/19/2020); IR CVC tunneled (7/23/2020); IR CVC tunneled (9/4/2020); IR CVC tunneled (10/9/2020); IR CVC tunneled (11/13/2020); IR CVC tunneled (12/18/2020); IR CVC tunneled (1/22/2021); IR CVC tunneled (2/26/2021); IR CVC tunneled (4/2/2021); IR CVC tunneled (5/7/2021); IR CVC tunneled (6/11/2021); IR CVC tunneled (7/16/2021); IR CVC tunneled (8/20/2021); IR CVC tunneled (9/24/2021); IR CVC tunneled (10/29/2021); IR CVC tunneled (12/3/2021); IR CVC tunneled (1/7/2022); IR CVC tunneled (2/23/2022); IR CVC tunneled (3/25/2022); IR CVC tunneled (4/22/2022); IR CVC tunneled (6/3/2022); IR CVC tunneled (9/18/2017); IR CVC tunneled (10/30/2017); IR CVC tunneled (12/19/2017); IR CVC tunneled  (1/6/2023); IR CVC tunneled (2/24/2023); IR CVC tunneled (7/8/2022); IR CVC tunneled (8/12/2022); IR CVC tunneled (9/16/2022); CT angio neck (10/19/2022); IR CVC tunneled (10/20/2022); IR CVC tunneled (11/29/2022); IR CVC tunneled (3/31/2023); IR CVC tunneled (6/9/2023); IR CVC tunneled (7/20/2023); IR CVC tunneled (8/16/2023); IR CVC tunneled (9/21/2023); IR CVC nontunneled (10/26/2023); IR CVC nontunneled (12/7/2023); and Biopsy (9/15/2024).     Social History  She reports that she quit smoking about 11 years ago. Her smoking use included cigarettes. She has been exposed to tobacco smoke. She has never used smokeless tobacco. She reports that she does not currently use alcohol. She reports that she does not currently use drugs.    Family History  Family History[1]     Allergies  Vancomycin and Oxycontin [oxycodone]    Medications  Current Outpatient Medications   Medication Instructions   • albuterol 90 mcg/actuation inhaler 2 puffs, inhalation, Every 6 hours PRN   • ascorbic acid (VITAMIN C) 500 mg, Daily   • atropine 1 % ophthalmic solution 1 drop, Left Eye, Nightly, Please continue to administer to Left eye until seen at outpt clinic with ophthalmology this upcoming week   • cyclobenzaprine (FLEXERIL) 10 mg, oral, 3 times daily PRN   • folic acid (FOLVITE) 1 mg, Daily   • furosemide (LASIX) 20 mg, oral, Every other day   • loratadine (CLARITIN) 10 mg, oral, Daily PRN   • metoprolol tartrate (LOPRESSOR) 25 mg, oral, 2 times daily   • multivitamin tablet 1 tablet, Daily   • naloxone (NARCAN) 4 mg, nasal, As needed, May repeat every 2-3 minutes if needed, alternating nostrils, until medical assistance becomes available.   • ondansetron (ZOFRAN) 4 mg, oral, Every 8 hours PRN   • oxyCODONE (ROXICODONE) 10 mg, oral, Every 4 hours PRN   • oxygen (O2) gas therapy 1 each, inhalation, Continuous   • phenoL (Chloraseptic) 1.4 % aerosol,spray 1 spray, Mouth/Throat, Every 2 hour PRN   • warfarin (COUMADIN) 5 mg, oral, Every  evening   • white petrolatum (Aquaphor) 41 % ointment ointment 2 Applications, Topical, 2 times daily, Apply vaseline to all eroded/denuded areas. Mepilex transfer sheets may be used for areas of friction         REVIEW OF SYSTEMS  Review of Systems - Oncology         Past Medical History  She has a past medical history of Asthma, CHF (congestive heart failure), Chronic pain disorder, Compression of vein (02/19/2020), and Hypotension, unspecified (12/10/2020).     Physical Exam  Patient is alert and oriented x3 and in a relaxed and appropriate mood. Her gait is steady and hand grasps are equal. Sclera is clear. The breasts are nearly symmetrical. The tissue is soft without palpable abnormalities, discrete nodules or masses. The skin and nipples appear normal. There is no cervical, supraclavicular or axillary lymphadenopathy. Heart rate and rhythm normal, S1 and S2 appreciated. The lungs are clear to auscultation bilaterally. Abdomen is soft and non-tender.       Physical Exam     Last Recorded Vitals  There were no vitals filed for this visit.    Relevant Results   Time was spent viewing digital images of the radiology testing with the patient. I explained the results in depth, along with suggested explanation for follow up recommendations based on the testing results. BI-RADS Category ***    Imaging  No results found for this or any previous visit from the past 365 days.       Assessment/Plan       PLAN:  ***    Patient Discussion/Summary  Your clinical examination and imaging are normal. Please return in one year for bilateral screening mammogram and office visit or sooner if you have any problems or concerns.     You can see your health information, review clinical summaries from office visits & test results online when you follow your health with MY  Chart, a personal health record. To sign up go to [email or web address]. If you need assistance with signing up or trouble getting into your account call  [facility] Patient Line 24/7 at [phone].    My office phone number is [phone] if you need to get in touch with me or have additional questions or concerns. Thank you for choosing [facility] and trusting me as your healthcare provider. I look forward to seeing you again at your next office visit. I am honored to be a provider on your health care team and I remain dedicated to helping you achieve your health goals.      Yocasta Oliva, APRN-CNP             [1]  Family History  Problem Relation Name Age of Onset   • Diabetes Father     • Gout Father     • Sickle cell trait Father     • Seizures Sister     • Diabetes Other     • Uterine cancer Other     • Other (congenital blindness) Cousin

## 2025-07-30 LAB
ALBUMIN SERPL BCP-MCNC: 3.4 G/DL (ref 3.4–5)
ALP SERPL-CCNC: 152 U/L (ref 33–110)
ALT SERPL W P-5'-P-CCNC: 10 U/L (ref 7–45)
ANION GAP SERPL CALC-SCNC: 11 MMOL/L (ref 10–20)
AST SERPL W P-5'-P-CCNC: 12 U/L (ref 9–39)
BACTERIA UR CULT: NORMAL
BASOPHILS # BLD AUTO: 0.02 X10*3/UL (ref 0–0.1)
BASOPHILS NFR BLD AUTO: 0.2 %
BILIRUB SERPL-MCNC: 3.2 MG/DL (ref 0–1.2)
BUN SERPL-MCNC: 25 MG/DL (ref 6–23)
CALCIUM SERPL-MCNC: 8.1 MG/DL (ref 8.6–10.6)
CHLORIDE SERPL-SCNC: 97 MMOL/L (ref 98–107)
CO2 SERPL-SCNC: 33 MMOL/L (ref 21–32)
CREAT SERPL-MCNC: 2.1 MG/DL (ref 0.5–1.05)
EGFRCR SERPLBLD CKD-EPI 2021: 27 ML/MIN/1.73M*2
EOSINOPHIL # BLD AUTO: 0.22 X10*3/UL (ref 0–0.7)
EOSINOPHIL NFR BLD AUTO: 2.7 %
ERYTHROCYTE [DISTWIDTH] IN BLOOD BY AUTOMATED COUNT: 23.5 % (ref 11.5–14.5)
GLUCOSE SERPL-MCNC: 110 MG/DL (ref 74–99)
HCT VFR BLD AUTO: 27.7 % (ref 36–46)
HGB BLD-MCNC: 9.1 G/DL (ref 12–16)
HGB RETIC QN: 27 PG (ref 28–38)
HOLD SPECIMEN: NORMAL
IMM GRANULOCYTES # BLD AUTO: 0.03 X10*3/UL (ref 0–0.7)
IMM GRANULOCYTES NFR BLD AUTO: 0.4 % (ref 0–0.9)
IMMATURE RETIC FRACTION: 31.6 %
INR PPP: 3.5 (ref 0.9–1.1)
LDH SERPL L TO P-CCNC: 193 U/L (ref 84–246)
LYMPHOCYTES # BLD AUTO: 1.57 X10*3/UL (ref 1.2–4.8)
LYMPHOCYTES NFR BLD AUTO: 19.3 %
MAGNESIUM SERPL-MCNC: 2.17 MG/DL (ref 1.6–2.4)
MCH RBC QN AUTO: 27 PG (ref 26–34)
MCHC RBC AUTO-ENTMCNC: 32.9 G/DL (ref 32–36)
MCV RBC AUTO: 82 FL (ref 80–100)
MONOCYTES # BLD AUTO: 0.7 X10*3/UL (ref 0.1–1)
MONOCYTES NFR BLD AUTO: 8.6 %
NEUTROPHILS # BLD AUTO: 5.59 X10*3/UL (ref 1.2–7.7)
NEUTROPHILS NFR BLD AUTO: 68.8 %
NRBC BLD-RTO: 50.1 /100 WBCS (ref 0–0)
PLATELET # BLD AUTO: 166 X10*3/UL (ref 150–450)
POTASSIUM SERPL-SCNC: 4.8 MMOL/L (ref 3.5–5.3)
PROT SERPL-MCNC: 6.4 G/DL (ref 6.4–8.2)
PROTHROMBIN TIME: 38.5 SECONDS (ref 9.8–12.4)
RBC # BLD AUTO: 3.37 X10*6/UL (ref 4–5.2)
RETICS #: 0.2 X10*6/UL (ref 0.02–0.08)
RETICS/RBC NFR AUTO: 6 % (ref 0.5–2)
SODIUM SERPL-SCNC: 136 MMOL/L (ref 136–145)
WBC # BLD AUTO: 8.1 X10*3/UL (ref 4.4–11.3)

## 2025-07-30 PROCEDURE — 85045 AUTOMATED RETICULOCYTE COUNT: CPT | Performed by: NURSE PRACTITIONER

## 2025-07-30 PROCEDURE — 99233 SBSQ HOSP IP/OBS HIGH 50: CPT

## 2025-07-30 PROCEDURE — 83615 LACTATE (LD) (LDH) ENZYME: CPT | Performed by: NURSE PRACTITIONER

## 2025-07-30 PROCEDURE — 2500000004 HC RX 250 GENERAL PHARMACY W/ HCPCS (ALT 636 FOR OP/ED)

## 2025-07-30 PROCEDURE — 83735 ASSAY OF MAGNESIUM: CPT | Performed by: NURSE PRACTITIONER

## 2025-07-30 PROCEDURE — 2500000001 HC RX 250 WO HCPCS SELF ADMINISTERED DRUGS (ALT 637 FOR MEDICARE OP)

## 2025-07-30 PROCEDURE — 85025 COMPLETE CBC W/AUTO DIFF WBC: CPT | Performed by: NURSE PRACTITIONER

## 2025-07-30 PROCEDURE — 85610 PROTHROMBIN TIME: CPT | Performed by: NURSE PRACTITIONER

## 2025-07-30 PROCEDURE — 1200000003 HC ONCOLOGY  ROOM WITH TELEMETRY DAILY

## 2025-07-30 PROCEDURE — 84075 ASSAY ALKALINE PHOSPHATASE: CPT | Performed by: NURSE PRACTITIONER

## 2025-07-30 PROCEDURE — 2500000002 HC RX 250 W HCPCS SELF ADMINISTERED DRUGS (ALT 637 FOR MEDICARE OP, ALT 636 FOR OP/ED): Performed by: NURSE PRACTITIONER

## 2025-07-30 PROCEDURE — 99223 1ST HOSP IP/OBS HIGH 75: CPT

## 2025-07-30 PROCEDURE — 94660 CPAP INITIATION&MGMT: CPT

## 2025-07-30 PROCEDURE — 36415 COLL VENOUS BLD VENIPUNCTURE: CPT | Performed by: NURSE PRACTITIONER

## 2025-07-30 RX ORDER — DEXTROSE MONOHYDRATE AND SODIUM CHLORIDE 5; .45 G/100ML; G/100ML
250 INJECTION, SOLUTION INTRAVENOUS ONCE
Status: COMPLETED | OUTPATIENT
Start: 2025-07-30 | End: 2025-07-30

## 2025-07-30 RX ADMIN — HYDROMORPHONE HYDROCHLORIDE 2 MG: 2 INJECTION, SOLUTION INTRAMUSCULAR; INTRAVENOUS; SUBCUTANEOUS at 09:44

## 2025-07-30 RX ADMIN — WARFARIN SODIUM 5 MG: 5 TABLET ORAL at 17:48

## 2025-07-30 RX ADMIN — PANTOPRAZOLE SODIUM 40 MG: 40 TABLET, DELAYED RELEASE ORAL at 06:15

## 2025-07-30 RX ADMIN — Medication: at 07:42

## 2025-07-30 RX ADMIN — HYDROMORPHONE HYDROCHLORIDE 2 MG: 2 INJECTION, SOLUTION INTRAMUSCULAR; INTRAVENOUS; SUBCUTANEOUS at 02:52

## 2025-07-30 RX ADMIN — HYDROMORPHONE HYDROCHLORIDE 2 MG: 2 INJECTION, SOLUTION INTRAMUSCULAR; INTRAVENOUS; SUBCUTANEOUS at 06:15

## 2025-07-30 RX ADMIN — HYDROMORPHONE HYDROCHLORIDE 2 MG: 2 INJECTION, SOLUTION INTRAMUSCULAR; INTRAVENOUS; SUBCUTANEOUS at 15:50

## 2025-07-30 RX ADMIN — METOPROLOL TARTRATE 25 MG: 25 TABLET, FILM COATED ORAL at 09:44

## 2025-07-30 RX ADMIN — CETIRIZINE HYDROCHLORIDE 10 MG: 10 TABLET ORAL at 09:44

## 2025-07-30 RX ADMIN — HYDROMORPHONE HYDROCHLORIDE 2 MG: 2 INJECTION, SOLUTION INTRAMUSCULAR; INTRAVENOUS; SUBCUTANEOUS at 23:18

## 2025-07-30 RX ADMIN — DEXTROSE AND SODIUM CHLORIDE 250 ML: 5; .45 INJECTION, SOLUTION INTRAVENOUS at 11:10

## 2025-07-30 RX ADMIN — HYDROMORPHONE HYDROCHLORIDE 2 MG: 2 INJECTION, SOLUTION INTRAMUSCULAR; INTRAVENOUS; SUBCUTANEOUS at 11:57

## 2025-07-30 RX ADMIN — FOLIC ACID 1 MG: 1 TABLET ORAL at 09:44

## 2025-07-30 RX ADMIN — ONDANSETRON HYDROCHLORIDE 8 MG: 8 TABLET, FILM COATED ORAL at 23:19

## 2025-07-30 RX ADMIN — METOPROLOL TARTRATE 25 MG: 25 TABLET, FILM COATED ORAL at 20:54

## 2025-07-30 RX ADMIN — HYDROMORPHONE HYDROCHLORIDE 2 MG: 2 INJECTION, SOLUTION INTRAMUSCULAR; INTRAVENOUS; SUBCUTANEOUS at 19:52

## 2025-07-30 RX ADMIN — HYDROMORPHONE HYDROCHLORIDE 2 MG: 2 INJECTION, SOLUTION INTRAMUSCULAR; INTRAVENOUS; SUBCUTANEOUS at 17:48

## 2025-07-30 RX ADMIN — Medication: at 19:24

## 2025-07-30 ASSESSMENT — PAIN - FUNCTIONAL ASSESSMENT
PAIN_FUNCTIONAL_ASSESSMENT: UNABLE TO SELF-REPORT
PAIN_FUNCTIONAL_ASSESSMENT: 0-10
PAIN_FUNCTIONAL_ASSESSMENT: UNABLE TO SELF-REPORT
PAIN_FUNCTIONAL_ASSESSMENT: 0-10

## 2025-07-30 ASSESSMENT — COGNITIVE AND FUNCTIONAL STATUS - GENERAL
CLIMB 3 TO 5 STEPS WITH RAILING: A LITTLE
DAILY ACTIVITIY SCORE: 24
MOBILITY SCORE: 23
MOBILITY SCORE: 24
DAILY ACTIVITIY SCORE: 24

## 2025-07-30 ASSESSMENT — PAIN SCALES - GENERAL
PAINLEVEL_OUTOF10: 4
PAINLEVEL_OUTOF10: 8
PAINLEVEL_OUTOF10: 9
PAINLEVEL_OUTOF10: 9
PAINLEVEL_OUTOF10: 8
PAINLEVEL_OUTOF10: 8
PAINLEVEL_OUTOF10: 9
PAINLEVEL_OUTOF10: 10 - WORST POSSIBLE PAIN
PAINLEVEL_OUTOF10: 7
PAINLEVEL_OUTOF10: 7
PAINLEVEL_OUTOF10: 9
PAINLEVEL_OUTOF10: 8

## 2025-07-30 NOTE — CONSULTS
Name: Senait Narvaez  MRN: [id number]  Encounter Date: 7/30/2025  PCP: No Assigned PCP Generic Provider, MD  Heme-Onc: Dr. Erich Whaley    Reason for consult: Sickle cell crisis  Attending Provider: Dr. Shala Cr    Hematology/ Oncology Consult Note      History of Present Illness   Senait Narvaez is a 55 y.o. 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 Pt's mother states this type of pain occurs frequently before pt is due for exchange transfusion and last occurred prior to her last exchange 6/19. Pt endorses chest pain, SOB, nausea, dizziness, and weakness, states these sx have been ongoing since last Wednesday and are typical of her pain crises. Pt states she expects these sx to continue for ~7 days. Denies fever, chills, and vision changes.    Heme/Onc History    HgbSC    Past Medical history     Diagnosis Date    Asthma      Breast mass (likely benign)     L breast bx 1/31/25      CHF (congestive heart failure)      Chronic constipation     Chronic pain disorder      Compression of vein 02/19/2020     Superior vena cava syndrome    HgbSC     Obstructive sleep apnea     Pulmonary hypertension     Recurrent VTE/PE        Past Surgical History   Surgical History[1]      Family History    Family History[2]      Social History   Social History[3]      Allergies   Allergies[4]    Medications   cetirizine, 10 mg, Daily  folic acid, 1 mg, Daily  furosemide, 20 mg, Every other day  metoprolol tartrate, 25 mg, BID  pantoprazole, 40 mg, Daily before breakfast  polyethylene glycol, 17 g, Daily  sennosides-docusate sodium, 2 tablet, BID  warfarin, 5 mg, Daily  white petrolatum, 2 Application, BID         albuterol, 2 puff, q6h PRN  cyclobenzaprine, 10 mg, TID PRN  diphenhydrAMINE,  "25 mg, q6h PRN  HYDROmorphone, 1 mg, q2h PRN  HYDROmorphone, 2 mg, q2h PRN  ondansetron, 8 mg, q8h PRN  oxygen, 1 Dose, Continuous - O2/gases        Review of Systems   Review of Systems   10 pt ROS reviewed and negative aside from above    Physical Exam   Blood pressure 112/72, pulse 77, temperature 36.5 °C (97.7 °F), temperature source Temporal, resp. rate 18, height 1.651 m (5' 5\"), weight 98.9 kg (218 lb), SpO2 99%.    ECOG= 1    Gen: awake, alert, in distress  HEENT: AT/NC, EOMI  Skin: warm and dry  Neuro: A&Ox4, moves all 4 extremities spontaneously     Labs     Lab Results   Component Value Date    GLUCOSE 110 (H) 07/30/2025    CALCIUM 8.1 (L) 07/30/2025     07/30/2025    K 4.8 07/30/2025    CO2 33 (H) 07/30/2025    CL 97 (L) 07/30/2025    BUN 25 (H) 07/30/2025    CREATININE 2.10 (H) 07/30/2025       Lab Results   Component Value Date    WBC 8.1 07/30/2025    HGB 9.1 (L) 07/30/2025    HCT 27.7 (L) 07/30/2025    MCV 82 07/30/2025    PLT  07/29/2025      Comment:      Platelet clumps preclude quantitation. Platelet estimate appears adequate       Lab Results   Component Value Date    ALT 10 07/30/2025    AST 12 07/30/2025    ALKPHOS 152 (H) 07/30/2025    BILITOT 3.2 (H) 07/30/2025       Imaging   ECG 12 lead  Result Date: 7/29/2025  Normal sinus rhythm Possible Left atrial enlargement Right axis deviation T wave abnormality, consider lateral ischemia Abnormal ECG When compared with ECG of 28-JUL-2025 23:11, (unconfirmed) No significant change was found Confirmed by Jonathan Rajput ([id number]) on 7/29/2025 4:21:36 PM    IR CVC nontunneled  Result Date: 7/29/2025  Interpreted By:  Baljit Jackson, STUDY: IR CVC NONTUNNELED;  7/29/2025 11:21 am   INDICATION: Signs/Symptoms:apheresis line for RBCEx (typically needs fem line).   COMPARISON: None.   ACCESSION NUMBER(S): [id number]   ORDERING CLINICIAN: ALEXANDRA ARRIAGA   TECHNIQUE: INTERVENTIONALIST(S): Baljit Jackson MD   CONSENT: The patient/patient's POA/next of " kin was informed of the nature of the proposed procedure. The purposes, alternatives, risks, and benefits were explained and discussed. All questions were answered and consent was obtained.   RADIATION EXPOSURE: Fluoroscopy time: 0.9 min Dose: 21.06 mGy Dose Area Product (DAP): 3799 mGy*cm^2   SEDATION: Moderate conscious IV sedation services (supervision of administration, induction, and maintenance) were provided by the physician performing the procedure with intravenous fentanyl 50mcg and versed 0.5mg for a total of 20 minutes of sedation time. The physician was assisted by an independent trained observer, an interventional radiology nurse, in the continuous monitoring of patient level of consciousness and physiologic status.   MEDICATION: None.   TIME OUT: A time out was performed immediately prior to procedure start with the interventional team, correctly identifying the patient name, date of birth, MRN, procedure, anatomy (including marking of site and side), patient position, procedure consent form, relevant laboratory and imaging test results, antibiotic administration, safety precautions, and procedure-specific equipment needs.   COMPLICATIONS: No immediate adverse events identified.   FINDINGS: The patient was positioned supine on the angiography table. The right groin cutaneous tissues were prepared and draped in sterile manner. 1% lidocaine local anesthesia was instilled into the subcutaneous soft tissues at the selected access site for local anesthesia. Ultrasound images demonstrate a patent and compressible  right common femoral vein. Utilizing direct ultrasound guidance and micropuncture/Seldinger technique, the  right  common femoral vein was accessed. An ultrasound digital spot image was acquired and stored on the  PACS. After confirmation of location, a 018 Elgin-Mandrel guidewire was introduced and advanced into the inferior vena cava utilizing intermittent fluoroscopy.   The needle was removed  with the guidewire left in place and exchanged for a 5-Moldovan coaxial dilator.  The inner dilator and wire were removed and a J-tipped 035 guidewire was introduced through the access sheath.  The skin tract was dilated with successive increase in size of vascular dilators under direct fluoroscopic visualization.   Subsequently, a temporary 13 Moldovan x 30 cm catheter was advanced with its tip overlying the cavoatrial junction under direct fluoroscopic guidance.  The catheter ports were aspirated and flushed with normal saline and charged with heparin. The external portions of the catheter were secured in place with sterile suture dressings.   The patient tolerated the procedure without complication.       1. Technically successful and uncomplicated placement of a right common femoral temporary dialysis catheter under direct ultrasound and fluoroscopic guidance - optimal catheter tip position at the right atrial superior vena caval junction and the catheter is ready for immediate use.   I was present for and/or performed the critical portions of the procedure and immediately available throughout the entire procedure.   I personally reviewed the image(s) / study and resident interpretation. I agree with the findings as stated.   Performed and dictated at [facility].   MACRO: None.   Signed by: Baljit Jackson 7/29/2025 2:20 PM Dictation workstation:   [id number]    ECG 12 lead  Result Date: 7/28/2025  Normal sinus rhythm Right ventricular hypertrophy ST & T wave abnormality, consider anterolateral ischemia Abnormal ECG When compared with ECG of 17-JUN-2025 09:55, T wave inversion more evident in Lateral leads See ED provider note for full interpretation and clinical correlation Confirmed by Fatou Cano ([id number]) on 7/28/2025 10:09:48 PM    CT head wo IV contrast  Result Date: 7/28/2025  Interpreted By:  Eric Sears and Dulla Kireeti STUDY: CT HEAD WO IV CONTRAST;  7/28/2025  5:20 pm   INDICATION: Signs/Symptoms:new posterior headache.     COMPARISON: CT head from 03/02/2025   ACCESSION NUMBER(S): [id number]   ORDERING CLINICIAN: TINY TURNER   TECHNIQUE: Noncontrast axial CT images of head were obtained with coronal and sagittal reconstructed images.   FINDINGS: BRAIN PARENCHYMA:  No acute intraparenchymal hemorrhage or parenchymal evidence of acute large territory ischemic infarct. Gray-white matter distinction is preserved. No mass-effect.   VENTRICLES and EXTRA-AXIAL SPACES:  No acute extra-axial or intraventricular hemorrhage. No effacement of cerebral sulci. The ventricles and sulci are age-concordant.   PARANASAL SINUSES/MASTOIDS:  No hemorrhage or air-fluid levels within the visualized paranasal sinuses. The mastoids are well aerated.   CALVARIUM/ORBITS:  No skull fracture.  The orbits and globes are intact to the extent visualized. A right-sided glaucoma valve is noted.   EXTRACRANIAL SOFT TISSUES: No discernible acute abnormality.       No evidence acute intracranial hemorrhage or of large territorial infarct.   I personally reviewed the images/study and I agree with the findings as stated by Carline Vickers MD, PGY-2 this study was interpreted at [place], [place].   MACRO: None.   Signed by: Eric Sears 7/28/2025 5:39 PM Dictation workstation:   [id number]    XR chest 2 views  Result Date: 7/28/2025  Interpreted By:  Sai Joshi, STUDY: Chest dated  7/28/2025.   INDICATION: Signs/Symptoms:sickle cell crisis   COMPARISON: Chest dated 06/17/2025.   ACCESSION NUMBER(S): [id number]   ORDERING CLINICIAN: TINY TURNER   TECHNIQUE: Two views of the chest.   FINDINGS: There is fullness to the nir and mild prominence of the pulmonary interstitium.  No pneumothorax or effusion is evident. The cardiomediastinal silhouette is  not enlarged.The soft tissues are grossly unremarkable.       Findings compatible with a degree of  pulmonary edema. A component of the fullness to the nir could be related to dilated pulmonary arterial tree secondary to pulmonary arterial hypertension.   MACRO: None   Signed by: Sai Joshi 7/28/2025 4:47 PM Dictation workstation:   [id number]       Assessment/Plan     Senait Narvaez is a 55 y.o. female w/ a past medical history of HgbSC who presents due to desatting to 70s on room air and diffuse body pain primarily in the bilateral lower extremities and lower back. Pt also endorses chest pain and SOB since last Wednesday and states this pain is typical of her sickle pain crises. Last ECG 7/29 showing NSR, pt currently on telemetry. CXR on admission 7/28 showing fullness to the nir and mild prominence of the pulmonary interstitium c/f pulmonary edema, may be secondary to pulmonary HTN 2/2 SCD. Likely contributing to CP and SOB. Previous CXR in 6/17/25 during last pain crisis did not show signs of edema but may have worsening hypoxic vasoconstriction during this crisis. Latest TTE in May 2025 showing reduced RV systolic function and RV volume and pressure overload. Decreased LV cavity size and mildly increased septal thickness, not likely to contribute to pulm edema. Pt with improving respiratory status today, satting at 99 on room air.    S/p transfusion exchange 6 units RBCs yesterday 7/30. Hgb stable at 9.1 from 9.0, LDH decreasing at 193 from 323. Patient also endorsing persistent pain, nausea, itching, and constipation- encouraged patient to utilize PRNs.    Plan  C/w Dilaudid IV q2h PRN 1mg moderate/2mg severe  C/w PO Zofran PRN  C/w PO Benadryl PRN, avoid IV if possible  C/w cyclobenzaprine PRN  C/w folic acid  C/w scheduled Miralax and Nita-Colace    Thank you for this consult, we will continue to follow. Patient seen and discussed with attending physician, Dr. Vail, who agrees with the above.     Alpa Del Angel MD  Internal Medicine PGY-1  Hematology Consult Pager: [phone]  Oncology Consult Pager:  [phone]          [1]   Past Surgical History:  Procedure Laterality Date    APPENDECTOMY  08/05/2013    Appendectomy    BIOPSY  9/15/2024    CHOLECYSTECTOMY  08/05/2013    Cholecystectomy    CT ANGIO NECK  10/19/2022    CT NECK ANGIO W AND WO IV CONTRAST 10/19/2022 [facility] CLINICAL LEGACY    CT GUIDED PERCUTANEOUS BIOPSY LYMPH NODE SUPERFICIAL  9/19/2019    CT GUIDED PERCUTANEOUS BIOPSY LYMPH NODE SUPERFICIAL 9/19/2019 [facility] [place]    IR CVC NONTUNNELED  10/26/2023    IR CVC NONTUNNELED 10/26/2023 [facility] ANGIO    IR CVC NONTUNNELED  12/7/2023    IR CVC NONTUNNELED 12/7/2023 Marcos Moser MD [facility]    IR CVC TUNNELED  3/13/2015    IR CVC TUNNELED 3/13/2015 [facility] CLINICAL LEGACY    IR CVC TUNNELED  1/29/2016    IR CVC TUNNELED 1/29/2016 [facility] AIB LEGACY    IR CVC TUNNELED  1/17/2018    IR CVC TUNNELED 1/17/2018 [facility] AIB LEGACY    IR CVC TUNNELED  2/22/2018    IR CVC TUNNELED 2/22/2018 [facility] AIB LEGACY    IR CVC TUNNELED  3/22/2018    IR CVC TUNNELED 3/22/2018 [facility] CLINICAL LEGACY    IR CVC TUNNELED  4/18/2018    IR CVC TUNNELED 4/18/2018 [facility] AIB LEGACY    IR CVC TUNNELED  5/16/2018    IR CVC TUNNELED 5/16/2018 [facility] AIB LEGACY    IR CVC TUNNELED  6/12/2018    IR CVC TUNNELED 6/12/2018 [facility]IB LEGACY    IR CVC TUNNELED  1/21/2020    IR CVC TUNNELED 1/21/2020 [facility] INPATIENT LEGACY    IR CVC TUNNELED  2/28/2020    IR CVC TUNNELED 2/28/2020 [facility] ANCILLARY LEGACY    IR CVC TUNNELED  4/10/2020    IR CVC TUNNELED 4/10/2020 [facility] AIB LEGACY    IR CVC TUNNELED  5/22/2020    IR CVC TUNNELED 5/22/2020 [facility] ANCILLARY LEGACY    IR CVC TUNNELED  6/19/2020    IR CVC TUNNELED 6/19/2020 [facility]    IR CVC TUNNELED  7/23/2020    IR CVC TUNNELED 7/23/2020 [facility]IB LEGACY    IR CVC TUNNELED  9/4/2020    IR CVC TUNNELED 9/4/2020 [facility]IB LEGACY    IR CVC TUNNELED  10/9/2020    IR CVC TUNNELED 10/9/2020 [facility] ANCILLARY LEGACY    IR CVC TUNNELED  11/13/2020    IR CVC TUNNELED 11/13/2020 [facility]IB LEGACY    IR CVC TUNNELED  12/18/2020    IR  CVC TUNNELED 12/18/2020 [facility]IB LEGACY    IR CVC TUNNELED  1/22/2021    IR CVC TUNNELED 1/22/2021 [facility]IB LEGACY    IR CVC TUNNELED  2/26/2021    IR CVC TUNNELED 2/26/2021 [facility] CLINICAL LEGACY    IR CVC TUNNELED  4/2/2021    IR CVC TUNNELED 4/2/2021 [facility]IB LEGAC[facility]    IR CVC TUNNELED  5/7/2021    IR CVC TUNNELED 5/7/2021 [facility] ANCILLARY LEGACY    IR CVC TUNNELED  6/11/2021    IR CVC TUNNELED 6/11/2021 [facility] [place] [facility]IB LEGACY    IR CVC TUNNELED  7/16/2021    IR CVC TUNNELED 7/16/2021 [facility] ANCILLARY LEGACY    IR CVC TUNNELED  8/20/2021    IR CVC TUNNELED 8/20/2021 [facility] [place] AIB LEGACY    IR CVC TUNNELED  9/24/2021    IR CVC TUNNELED 9/24/2021 [facility] AIB LEGACY    IR CVC TUNNELED  10/29/2021    IR CVC TUNNELED 10/29/2021 [facility] [place] AIB LEGACY    IR CVC TUNNELED  12/3/2021    IR CVC TUNNELED 12/3/2021 [facility] [place] [facility]IB LEGACY    IR CVC TUNNELED  1/7/2022    IR CVC TUNNELED 1/7/2022 [facility] [place] ANCILLARY LEGACY    IR CVC TUNNELED  2/23/2022    IR CVC TUNNELED 2/23/2022 [facility] CLINICAL LEGACY    IR CVC TUNNELED  3/25/2022    IR CVC TUNNELED 3/25/2022 [facility] [place] [facility]CILLARY LEGACY    IR CVC TUNNELED  4/22/2022    IR CVC TUNNELED 4/22/2022 [facility]LL[facility] LEGAC[facility]    IR CVC TUNNELED  6/3/2022    IR CVC TUNNELED 6/3/2022 [facility]CILLARY LEGACY    IR CVC TUNNELED  9/18/2017    IR CVC TUNNELED 9/18/2017 [facility] LEGACY    IR CVC TUNNELED  10/30/2017    IR CVC TUNNELED 10/30/2017 [facility]IB LEGACY    IR CVC TUNNELED  12/19/2017    IR CVC TUNNELED 12/19/2017 [facility]IB LEGACY    IR CVC TUNNELED  1/6/2023    IR CVC TUNNELED 1/6/2023 [facility] LEGAC[facility]    IR CVC TUNNELED  2/24/2023    IR CVC TUNNELED [facility]    IR CVC TUNNELED  7/8/2022    IR CVC TUNNELED 7/8/2022 [facility]CILLARY LEGACY    IR CVC TUNNELED  8/12/2022    IR CVC TUNNELED 8/12/2022 [facility] CLINICAL LEGACY    IR CVC TUNNELED  9/16/2022    IR CVC TUNNELED 9/16/2022 [facility]CILLARY LEGAC[facility]    IR CVC TUNNELED  10/20/2022    IR CVC TUNNELED 10/20/2022 [facility] CLINICAL LEGACY    IR CVC TUNNELED  11/29/2022    IR CVC TUNNELED 11/29/2022 [facility] ANCILLARY  LEGACY    IR CVC TUNNELED  3/31/2023    IR CVC TUNNELED [facility]    IR CVC TUNNELED  2023    IR CVC TUNNELED 2023 [facility]    IR CVC TUNNELED  2023    IR CVC TUNNELED 2023 [facility]    IR CVC TUNNELED  2023    IR CVC TUNNELED 2023 [facility]    IR CVC TUNNELED  2023    IR CVC TUNNELED 2023 [facility]    MR HEAD ANGIO WO IV CONTRAST  2014    MR HEAD ANGIO WO IV CONTRAST 2014 [facility] ANCILLARY LEGACY    MR NECK ANGIO WO IV CONTRAST  2014    MR NECK ANGIO WO IV CONTRAST [place] [facility] [place]    OTHER SURGICAL HISTORY  2014    Fluid Drained, Retina Inspected: Breaks Supported By Buckle    OTHER SURGICAL HISTORY  10/09/2014    Closed Treatment Of Fracture Of The Lateral Malleolus    OTHER SURGICAL HISTORY  2014    Salpingo-oophorectomy Right Side    US GUIDED BIOPSY LYMPH NODE SUPERFICIAL  2019    US GUIDED BIOPSY LYMPH NODE SUPERFICIAL [place] [facility] [place]   [2]   Family History  Problem Relation Name Age of Onset    Diabetes Father      Gout Father      Sickle cell trait Father      Seizures Sister      Diabetes Other      Uterine cancer Other      Other (congenital blindness) Cousin     [3]   Social History  Socioeconomic History    Marital status: Single   Tobacco Use    Smoking status: Former     Current packs/day: 0.00     Types: Cigarettes     Quit date:      Years since quittin.5     Passive exposure: Past    Smokeless tobacco: Never   Substance and Sexual Activity    Alcohol use: Not Currently    Drug use: Not Currently     Social Drivers of Health     Financial Resource Strain: Low Risk  (2025)    Overall Financial Resource Strain (CARDIA)     Difficulty of Paying Living Expenses: Not very hard   Food Insecurity: No Food Insecurity (2025)    Hunger Vital Sign     Worried About Running Out of Food in the Last Year: Never true     Ran Out of Food in the Last Year: Never true   Transportation Needs: Unmet  Transportation Needs (7/29/2025)    PRAPARE - Transportation     Lack of Transportation (Medical): Yes     Lack of Transportation (Non-Medical): Yes   Physical Activity: Sufficiently Active (7/29/2025)    Exercise Vital Sign     Days of Exercise per Week: 2 days     Minutes of Exercise per Session: 80 min   Stress: No Stress Concern Present (7/29/2025)    [employer] - Occupational Stress Questionnaire     Feeling of Stress: Only a little   Social Connections: Unknown (7/29/2025)    Social Connection and Isolation Panel     Frequency of Communication with Friends and Family: More than three times a week     Frequency of Social Gatherings with Friends and Family: More than three times a week     Attends [employer] Services: 1 to 4 times per year     Active Member of Clubs or Organizations: No     Attends Club or Organization Meetings: Never     Marital Status: Patient declined   Intimate Partner Violence: Not At Risk (7/29/2025)    Humiliation, Afraid, Rape, and Kick questionnaire     Fear of Current or Ex-Partner: No     Emotionally Abused: No     Physically Abused: No     Sexually Abused: No   Housing Stability: Low Risk  (7/29/2025)    Housing Stability Vital Sign     Unable to Pay for Housing in the Last Year: No     Number of Times Moved in the Last Year: 0     Homeless in the Last Year: No   [4]   Allergies  Allergen Reactions    Vancomycin Rash    Oxycontin [Oxycodone] Drowsiness

## 2025-07-30 NOTE — PROGRESS NOTES
"Senait Narvaez is a 55 y.o. female on day 2 of admission presenting with Sickle cell anemia with pain.    Subjective   Patient seen resting in bed. Reports increased pain overnight and this morning. Pain is primarily located to her lower back and extends down bilateral lower extremities. Eating and drinking well having bowel movements. Continues to report shortness of breath, although feels similar to her baseline. Denies any chest pain.        Objective     Physical Exam  Constitutional:       Appearance: Normal appearance. She is obese.   HENT:      Head: Normocephalic.      Mouth/Throat:      Mouth: Mucous membranes are moist.     Eyes:      Pupils: Pupils are equal, round, and reactive to light.       Cardiovascular:      Rate and Rhythm: Normal rate and regular rhythm.      Pulses: Normal pulses.      Heart sounds: Normal heart sounds.   Pulmonary:      Effort: Pulmonary effort is normal.      Breath sounds: Normal breath sounds.      Comments: 2L NC in place  Abdominal:      General: Abdomen is flat. There is no distension.      Palpations: Abdomen is soft.      Tenderness: There is no abdominal tenderness. There is no guarding.     Musculoskeletal:         General: Normal range of motion.      Cervical back: Normal range of motion and neck supple.      Right lower leg: No edema.      Left lower leg: No edema.     Skin:     General: Skin is warm.      Comments: Femoral apharesis line in place      Neurological:      General: No focal deficit present.      Mental Status: She is alert.         Last Recorded Vitals  Blood pressure 112/72, pulse 77, temperature 36.5 °C (97.7 °F), temperature source Temporal, resp. rate 18, height 1.651 m (5' 5\"), weight 98.9 kg (218 lb), SpO2 99%.  Intake/Output last 3 Shifts:  I/O last 3 completed shifts:  In: 2430 (24.6 mL/kg) [P.O.:450; Other:1880; IV Piggyback:100]  Out: 1851 (18.7 mL/kg) [Other:1851]  Weight: 98.9 kg     Relevant Results             This patient has a central " line   Reason for the central line remaining today? RBCex    Scheduled medications  Scheduled Medications[1]  Continuous medications  Continuous Medications[2]  PRN medications  PRN Medications[3]    Results for orders placed or performed during the hospital encounter of 07/28/25 (from the past 24 hours)   Screen Opiate/Opioid/Benzo Prescription Compliance   Result Value Ref Range    Creatinine, Urine Random 203.2 20.0 - 320.0 mg/dL    Amphetamine Screen, Urine Presumptive Negative Presumptive Negative    Barbiturate Screen, Urine Presumptive Negative Presumptive Negative    Cannabinoid Screen, Urine Presumptive Negative Presumptive Negative    Cocaine Metabolite Screen, Urine Presumptive Negative Presumptive Negative    PCP Screen, Urine Presumptive Negative Presumptive Negative   Urine electrolytes   Result Value Ref Range    Sodium, Urine Random 56 mmol/L    Sodium/Creatinine Ratio 28 Not established. mmol/g Creat    Potassium, Urine Random 25 mmol/L    Potassium/Creatinine Ratio 12 Not established mmol/g Creat    Chloride, Urine Random 61 mmol/L    Chloride/Creatinine Ratio 30 (L) 38 - 318 mmol/g creat    Creatinine, Urine Random 202.4 20.0 - 320.0 mg/dL   Urinalysis with Reflex Culture and Microscopic   Result Value Ref Range    Color, Urine Yellow Light-Yellow, Yellow, Dark-Yellow    Appearance, Urine Turbid (N) Clear    Specific Gravity, Urine 1.014 1.005 - 1.035    pH, Urine 6.0 5.0, 5.5, 6.0, 6.5, 7.0, 7.5, 8.0    Protein, Urine 100 (2+) (A) NEGATIVE, 10 (TRACE), 20 (TRACE) mg/dL    Glucose, Urine Normal Normal mg/dL    Blood, Urine NEGATIVE NEGATIVE mg/dL    Ketones, Urine NEGATIVE NEGATIVE mg/dL    Bilirubin, Urine NEGATIVE NEGATIVE mg/dL    Urobilinogen, Urine 2 (1+) (A) Normal mg/dL    Nitrite, Urine NEGATIVE NEGATIVE    Leukocyte Esterase, Urine NEGATIVE NEGATIVE   Extra Urine Gray Tube   Result Value Ref Range    Extra Tube     Urinalysis Microscopic   Result Value Ref Range    WBC, Urine 11-20 (A)  1-5, NONE /HPF    RBC, Urine 3-5 NONE, 1-2, 3-5 /HPF    Squamous Epithelial Cells, Urine 1-9 (SPARSE) Reference range not established. /HPF    Bacteria, Urine 2+ (A) NONE SEEN /HPF    Mucus, Urine FEW Reference range not established. /LPF    Hyaline Casts, Urine 3+ (A) NONE /LPF   Urine Culture    Specimen: Clean Catch/Voided; Urine   Result Value Ref Range    Urine Culture       Growth indicates contamination with mixed bacterial jake. Repeat culture if clinically indicated.   CBC   Result Value Ref Range    WBC 6.8 4.4 - 11.3 x10*3/uL    nRBC 42.5 (H) 0.0 - 0.0 /100 WBCs    RBC 3.45 (L) 4.00 - 5.20 x10*6/uL    Hemoglobin 9.0 (L) 12.0 - 16.0 g/dL    Hematocrit 29.0 (L) 36.0 - 46.0 %    MCV 84 80 - 100 fL    MCH 26.1 26.0 - 34.0 pg    MCHC 31.0 (L) 32.0 - 36.0 g/dL    RDW 23.0 (H) 11.5 - 14.5 %    Platelets     Calcium, Ionized   Result Value Ref Range    POCT Calcium, Ionized 0.95 (L) 1.1 - 1.33 mmol/L   HEMOGLOBIN IDENTIFICATION WITH PATH REVIEW   Result Value Ref Range    Hemoglobin A      Hemoglobin F      Hemoglobin S 12.0 (H) <=0.0 %    Hemoglobin C 11.7 (H) <=0.0 %    Hemoglobin A2      Hemoglobin Identification Interpretation      Pathologist Review-Hemoglobin Identification     Protime-INR   Result Value Ref Range    Protime 38.5 (H) 9.8 - 12.4 seconds    INR 3.5 (H) 0.9 - 1.1   CBC and Auto Differential   Result Value Ref Range    WBC 8.1 4.4 - 11.3 x10*3/uL    nRBC 50.1 (H) 0.0 - 0.0 /100 WBCs    RBC 3.37 (L) 4.00 - 5.20 x10*6/uL    Hemoglobin 9.1 (L) 12.0 - 16.0 g/dL    Hematocrit 27.7 (L) 36.0 - 46.0 %    MCV 82 80 - 100 fL    MCH 27.0 26.0 - 34.0 pg    MCHC 32.9 32.0 - 36.0 g/dL    RDW 23.5 (H) 11.5 - 14.5 %    Platelets      Neutrophils %      Immature Granulocytes %, Automated      Lymphocytes %      Monocytes %      Eosinophils %      Basophils %      Neutrophils Absolute      Lymphocytes Absolute      Monocytes Absolute      Eosinophils Absolute      Basophils Absolute     Comprehensive Metabolic  Panel   Result Value Ref Range    Glucose 110 (H) 74 - 99 mg/dL    Sodium 136 136 - 145 mmol/L    Potassium 4.8 3.5 - 5.3 mmol/L    Chloride 97 (L) 98 - 107 mmol/L    Bicarbonate 33 (H) 21 - 32 mmol/L    Anion Gap 11 10 - 20 mmol/L    Urea Nitrogen 25 (H) 6 - 23 mg/dL    Creatinine 2.10 (H) 0.50 - 1.05 mg/dL    eGFR 27 (L) >60 mL/min/1.73m*2    Calcium 8.1 (L) 8.6 - 10.6 mg/dL    Albumin 3.4 3.4 - 5.0 g/dL    Alkaline Phosphatase 152 (H) 33 - 110 U/L    Total Protein 6.4 6.4 - 8.2 g/dL    AST 12 9 - 39 U/L    Bilirubin, Total 3.2 (H) 0.0 - 1.2 mg/dL    ALT 10 7 - 45 U/L   Reticulocytes   Result Value Ref Range    Retic % 6.0 (H) 0.5 - 2.0 %    Retic Absolute 0.201 (H) 0.018 - 0.083 x10*6/uL    Reticulocyte Hemoglobin 27 (L) 28 - 38 pg    Immature Retic fraction 31.6 (H) <=16.0 %   Lactate dehydrogenase   Result Value Ref Range     84 - 246 U/L   Magnesium   Result Value Ref Range    Magnesium 2.17 1.60 - 2.40 mg/dL     *Note: Due to a large number of results and/or encounters for the requested time period, some results have not been displayed. A complete set of results can be found in Results Review.                  Assessment & Plan  Sickle cell anemia with pain  Senait Narvaez is a 55 y.o. 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 She was placed on 3L NC in ED. She reported sickle cell pain and HA--> CT head (7/28) negative. CXR (7/28) pulm edema, fullness to the hilia could be r/t dilated pulm arterial tree 2/2 pulm HTN. She was admitted for further management of hypoxia and pain. Hgb and lysis labs initially stable on  admit, elevate on 7/30. Started on IV dilaudid for pain management. Pt went into symptomatic (hypotensive) SVT over night 7/28-7/29, improved with vagal maneuvers. She was due for outpt RBCEx 7/31, completed inpatient on 7/29. DC pending improvement in pain.     Updates 7/30:   - S/p RBCex on 7/29 with increased post-exchange pain today   - up trending kidney function; sCr 2.10. Urine lytes 0.4% pre-renal on 7/29. Plan 250mL bolus today per attending (pt with h/o of becoming fluid overload)  - Left breast US ordered -- was scheduled today as outpt  - Continue current pain regimen     # Hb SC disease with acute on chronic pain  - Presented to ED with uncontrolled typical sickle cell pain and desaturation and was placed on 3L of O2  -Currently on q6 week RBC exchange transfusions. Last transfusion was on 6/19. Next exchange transfusions as scheduled  on 7/31/2025--> s/p RBCex on 7/29  - Carepath reviewed, last updated 7/28/25: IV Dilaudid 1-1.5 mg every 2-3 hours prn for severe pain  and 0.75-1 mg every 2-3 hrs for moderate pain.  - OARRS reviewed, no aberrant behavior noted    - Hgb BL ~8, Hgb 9.5 on admission (7/28)--> 9.1 (7/30)- no indication for simple transfusion   - Tbili BL ~ 1.0,Tbili 4.4 on admission (7/28)--> 3.2 (7/30)   - LDH ~150-200,  on admission (7/28)--> 193 (7/30)   - CXR (7/28): pulm edema, fullness to the hilia could be r/t dilated pulm arterial tree 2/2 pulm HTN  - CT head (7/28): negative for acute process  - Continue IV dilaudid 1mg q2hrs PRN moderate pain and IV dilaudid 2mg q2hrs PRN for severe pain (7/28- current) per care plan  - Continue Folic Acid 1mg daily and Cyclobenzaprine 10mg TID PRN muscle spasms  - Bowel regimen for opioid induced constipation with DocuSenna 2tabs BID and Miralax daily  - PO Benadryl 25 mg q6h PRN for opioid-induced pruritus, PO Zofran 8 mg q8h PRN for opioid-induced nausea   - Utox (7/28): pending  - Hgb S (7/28): 27.1%  - Exchange labs ordered 7/28  - CRP  4.87/ESR 20 (7/29)  - IS encouraged      # CKD II  - sCr BL fluctuates but ~1; sCr 1.73 (7/29)--> 2.10 (7/30)   - UA - 2+ bacteria (7/29), culture pending   - Urine electrolytes on 7/29 indication pre-renal etiology   - per attending plan for 250mL bolus on 7/30 as pt with history of fluid overload and pulmonary edema noted on CXR 7/28    # Acute SVT with hx SVT  - Over night 7/28-7/29 pt with episode of SVT HR 180s, improved spontaneously with vagal maneuvers  - EKG (7/28): showing SVT  - Repeat EKG pending 7/29  - Pt with hx of SVT and follows with cardiology  - Continue home Metop Tartrate 25mg BID  - She was discharged from last hospital admission with holter monitor for palpitations and SVT, which has been controlled with vagal maneuvers  - Continue telemetry monitoring  - Cardiology FUV 8/25    # Known L Breast Mass  - Saw Gracy Barr PA-C outpt on 1/17/25  - Imaging showing likely benign L breast mass with plan for repeat b/l mammogram around 7/29/25  - Planned for repeat imaging 7/30, ordered as inpatient. Patient will follow-up with surg onc FUV 8/1     # HFpEF   # pHTN   # Hx of troponinemia   - Last TTE 5/9/25: HFpEF with EF of 67% right ventricular overloaded, severely enlarged RV    - Troponin leak likely iso demand   - Continue Lasix 20mg every other day  - CXR (7/28): Pulm edema, fullness to the hilia could be r/t dilated pulm arterial tree 2/2 pulm HTN  - s/p 20 mg IV lasix in ED  - Will give IV Lasix PRN in addition to home PO dosing every other day    # Hx of DVT/PE   # Hx of SVC syndrom  - Continue home Warfarin 5mg daily  - INR 2.9 (7/29) --> 3.5 (7/30)   - Daily INRs    # CAN  # Supplemental O2 use  - Continue home 2L NC and CPAP at night  - Currently on 3L NC as of 7/29     DVT Prophy: Therapeutic Warfarin, SCDs, encourage safe ambulation     DISPO:  - Full code  - DC pending improvement in pain, improvement of sCr   - Surg Onc FUV 8/1 (may need to be rescheduled if she's still admitted),  Ophthalmology FUV 8/6, Cardiology FUV 8/25, SS FUV requested and pending as of 7/30         I spent 60 minutes in the professional and overall care of this patient.    Assessment and plan as above discussed with attending physician, Dr. Tayo Ferrell, PAGualbertoC         [1] cetirizine, 10 mg, oral, Daily  dextrose 5 % and sodium chloride 0.45 % bolus, 250 mL, intravenous, Once  folic acid, 1 mg, oral, Daily  furosemide, 20 mg, oral, Every other day  metoprolol tartrate, 25 mg, oral, BID  pantoprazole, 40 mg, oral, Daily before breakfast  polyethylene glycol, 17 g, oral, Daily  sennosides-docusate sodium, 2 tablet, oral, BID  warfarin, 5 mg, oral, Daily  white petrolatum, 2 Application, Topical, BID  [2]    [3] PRN medications: albuterol, cyclobenzaprine, diphenhydrAMINE, HYDROmorphone, HYDROmorphone, ondansetron, oxygen

## 2025-07-30 NOTE — CARE PLAN
The patient's goals for the shift include      The clinical goals for the shift include Pt will remain HDS and VSS throughout shift end on 7/30/25 @0700      Problem: Pain - Adult  Goal: Verbalizes/displays adequate comfort level or baseline comfort level  Outcome: Progressing     Problem: Safety - Adult  Goal: Free from fall injury  Outcome: Progressing     Problem: Discharge Planning  Goal: Discharge to home or other facility with appropriate resources  Outcome: Progressing     Problem: Chronic Conditions and Co-morbidities  Goal: Patient's chronic conditions and co-morbidity symptoms are monitored and maintained or improved  Outcome: Progressing     Problem: Nutrition  Goal: Nutrient intake appropriate for maintaining nutritional needs  Outcome: Progressing     Problem: Pain  Goal: Takes deep breaths with improved pain control throughout the shift  Outcome: Progressing  Goal: Turns in bed with improved pain control throughout the shift  Outcome: Progressing  Goal: Walks with improved pain control throughout the shift  Outcome: Progressing  Goal: Performs ADL's with improved pain control throughout shift  Outcome: Progressing  Goal: Participates in PT with improved pain control throughout the shift  Outcome: Progressing  Goal: Free from opioid side effects throughout the shift  Outcome: Progressing  Goal: Free from acute confusion related to pain meds throughout the shift  Outcome: Progressing     Problem: Fall/Injury  Goal: Not fall by end of shift  Outcome: Progressing  Goal: Be free from injury by end of the shift  Outcome: Progressing  Goal: Verbalize understanding of personal risk factors for fall in the hospital  Outcome: Progressing  Goal: Verbalize understanding of risk factor reduction measures to prevent injury from fall in the home  Outcome: Progressing  Goal: Use assistive devices by end of the shift  Outcome: Progressing  Goal: Pace activities to prevent fatigue by end of the shift  Outcome:  Progressing

## 2025-07-30 NOTE — ASSESSMENT & PLAN NOTE
Senait Narvaez is a 55 y.o. 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 She was placed on 3L NC in ED. She reported sickle cell pain and HA--> CT head (7/28) negative. CXR (7/28) pulm edema, fullness to the hilia could be r/t dilated pulm arterial tree 2/2 pulm HTN. She was admitted for further management of hypoxia and pain. Hgb and lysis labs initially stable on admit, elevate on 7/30. Started on IV dilaudid for pain management. Pt went into symptomatic (hypotensive) SVT over night 7/28-7/29, improved with vagal maneuvers. She was due for outpt RBCEx 7/31, completed inpatient on 7/29. DC pending improvement in pain.     Updates 7/30:   - S/p RBCex on 7/29 with increased post-exchange pain today   - up trending kidney function; sCr 2.10. Urine lytes 0.4% pre-renal on 7/29. Plan 250mL bolus today per attending (pt with h/o of becoming fluid overload)  - Left breast US ordered -- was scheduled today as outpt  - Continue current pain regimen     # Hb SC disease with acute on chronic pain  - Presented to ED with uncontrolled typical sickle cell pain and desaturation and was placed on 3L of O2  -Currently on q6 week RBC exchange transfusions. Last transfusion was on 6/19. Next exchange transfusions as scheduled  on 7/31/2025--> s/p RBCex on 7/29  - Carepath reviewed, last updated 7/28/25: IV Dilaudid 1-1.5 mg every 2-3 hours prn for severe pain  and 0.75-1 mg every 2-3 hrs for moderate pain.  - OARRS reviewed, no aberrant behavior noted    - Hgb BL ~8, Hgb 9.5 on admission (7/28)--> 9.1 (7/30)- no indication for simple transfusion   -  Tbili BL ~ 1.0,Tbili 4.4 on admission (7/28)--> 3.2 (7/30)   - LDH ~150-200,  on admission (7/28)--> 193 (7/30)   - CXR (7/28): pulm edema, fullness to the hilia could be r/t dilated pulm arterial tree 2/2 pulm HTN  - CT head (7/28): negative for acute process  - Continue IV dilaudid 1mg q2hrs PRN moderate pain and IV dilaudid 2mg q2hrs PRN for severe pain (7/28- current) per care plan  - Continue Folic Acid 1mg daily and Cyclobenzaprine 10mg TID PRN muscle spasms  - Bowel regimen for opioid induced constipation with DocuSenna 2tabs BID and Miralax daily  - PO Benadryl 25 mg q6h PRN for opioid-induced pruritus, PO Zofran 8 mg q8h PRN for opioid-induced nausea   - Utox (7/28): pending  - Hgb S (7/28): 27.1%  - Exchange labs ordered 7/28  - CRP 4.87/ESR 20 (7/29)  - IS encouraged      # CKD II  - sCr BL fluctuates but ~1; sCr 1.73 (7/29)--> 2.10 (7/30)   - UA - 2+ bacteria (7/29), culture pending   - Urine electrolytes on 7/29 indication pre-renal etiology   - per attending plan for 250mL bolus on 7/30 as pt with history of fluid overload and pulmonary edema noted on CXR 7/28    # Acute SVT with hx SVT  - Over night 7/28-7/29 pt with episode of SVT HR 180s, improved spontaneously with vagal maneuvers  - EKG (7/28): showing SVT  - Repeat EKG pending 7/29  - Pt with hx of SVT and follows with cardiology  - Continue home Metop Tartrate 25mg BID  - She was discharged from last hospital admission with holter monitor for palpitations and SVT, which has been controlled with vagal maneuvers  - Continue telemetry monitoring  - Cardiology FUV 8/25    # Known L Breast Mass  - Saw Gracy Barr PA-C outpt on 1/17/25  - Imaging showing likely benign L breast mass with plan for repeat b/l mammogram around 7/29/25  - Planned for repeat imaging 7/30, ordered as inpatient. Patient will follow-up with surg onc FUV 8/1     # HFpEF   # pHTN   # Hx of troponinemia   - Last TTE 5/9/25: HFpEF with EF of 67% right ventricular  overloaded, severely enlarged RV    - Troponin leak likely iso demand   - Continue Lasix 20mg every other day  - CXR (7/28): Pulm edema, fullness to the hilia could be r/t dilated pulm arterial tree 2/2 pulm HTN  - s/p 20 mg IV lasix in ED  - Will give IV Lasix PRN in addition to home PO dosing every other day    # Hx of DVT/PE   # Hx of SVC syndrom  - Continue home Warfarin 5mg daily  - INR 2.9 (7/29) --> 3.5 (7/30)   - Daily INRs    # CAN  # Supplemental O2 use  - Continue home 2L NC and CPAP at night  - Currently on 3L NC as of 7/29     DVT Prophy: Therapeutic Warfarin, SCDs, encourage safe ambulation     DISPO:  - Full code  - DC pending improvement in pain, improvement of sCr   - Surg Onc FUV 8/1 (may need to be rescheduled if she's still admitted), Ophthalmology FUV 8/6, Cardiology FUV 8/25, SS FUV requested and pending as of 7/30

## 2025-07-31 ENCOUNTER — APPOINTMENT (OUTPATIENT)
Dept: OTHER | Facility: HOSPITAL | Age: 55
End: 2025-07-31
Payer: COMMERCIAL

## 2025-07-31 ENCOUNTER — APPOINTMENT (OUTPATIENT)
Dept: RADIOLOGY | Facility: HOSPITAL | Age: 55
End: 2025-07-31
Payer: COMMERCIAL

## 2025-07-31 PROBLEM — D57.00 SICKLE CELL ANEMIA WITH PAIN: Status: RESOLVED | Noted: 2025-07-28 | Resolved: 2025-07-31

## 2025-07-31 LAB
1OH-MIDAZOLAM UR CFM-MCNC: <25 NG/ML
6MAM UR CFM-MCNC: <25 NG/ML
7AMINOCLONAZEPAM UR CFM-MCNC: <25 NG/ML
A-OH ALPRAZ UR CFM-MCNC: <25 NG/ML
ALBUMIN SERPL BCP-MCNC: 3.3 G/DL (ref 3.4–5)
ALP SERPL-CCNC: 140 U/L (ref 33–110)
ALPRAZ UR CFM-MCNC: <25 NG/ML
ALT SERPL W P-5'-P-CCNC: 9 U/L (ref 7–45)
ANION GAP SERPL CALC-SCNC: 12 MMOL/L (ref 10–20)
AST SERPL W P-5'-P-CCNC: 12 U/L (ref 9–39)
BASOPHILS # BLD AUTO: 0.01 X10*3/UL (ref 0–0.1)
BASOPHILS NFR BLD AUTO: 0.1 %
BILIRUB SERPL-MCNC: 2.7 MG/DL (ref 0–1.2)
BUN SERPL-MCNC: 30 MG/DL (ref 6–23)
CALCIUM SERPL-MCNC: 8.2 MG/DL (ref 8.6–10.6)
CHLORDIAZEP UR CFM-MCNC: <25 NG/ML
CHLORIDE SERPL-SCNC: 98 MMOL/L (ref 98–107)
CLONAZEPAM UR CFM-MCNC: <25 NG/ML
CO2 SERPL-SCNC: 32 MMOL/L (ref 21–32)
CODEINE UR CFM-MCNC: <50 NG/ML
CREAT SERPL-MCNC: 2.74 MG/DL (ref 0.5–1.05)
DIAZEPAM UR CFM-MCNC: <25 NG/ML
EDDP UR CFM-MCNC: <25 NG/ML
EGFRCR SERPLBLD CKD-EPI 2021: 20 ML/MIN/1.73M*2
EOSINOPHIL # BLD AUTO: 0.3 X10*3/UL (ref 0–0.7)
EOSINOPHIL NFR BLD AUTO: 3.3 %
ERYTHROCYTE [DISTWIDTH] IN BLOOD BY AUTOMATED COUNT: 25 % (ref 11.5–14.5)
FENTANYL UR CFM-MCNC: <2.5 NG/ML
GLUCOSE SERPL-MCNC: 110 MG/DL (ref 74–99)
HCT VFR BLD AUTO: 26.9 % (ref 36–46)
HEMOGLOBIN A2: 2.8 % (ref 2–3.5)
HEMOGLOBIN A: 73 % (ref 95.8–98)
HEMOGLOBIN C: 11.7 %
HEMOGLOBIN F: 0.5 % (ref 0–2)
HEMOGLOBIN IDENTIFICATION INTERPRETATION: ABNORMAL
HEMOGLOBIN S: 12 %
HGB BLD-MCNC: 8.5 G/DL (ref 12–16)
HGB RETIC QN: 25 PG (ref 28–38)
HYDROCODONE CTO UR CFM-MCNC: <25 NG/ML
HYDROMORPHONE UR CFM-MCNC: >2500 NG/ML
IMM GRANULOCYTES # BLD AUTO: 0.05 X10*3/UL (ref 0–0.7)
IMM GRANULOCYTES NFR BLD AUTO: 0.5 % (ref 0–0.9)
IMMATURE RETIC FRACTION: 32.1 %
INR PPP: 5.3 (ref 0.9–1.1)
LDH SERPL L TO P-CCNC: 192 U/L (ref 84–246)
LORAZEPAM UR CFM-MCNC: <25 NG/ML
LYMPHOCYTES # BLD AUTO: 1.24 X10*3/UL (ref 1.2–4.8)
LYMPHOCYTES NFR BLD AUTO: 13.5 %
MAGNESIUM SERPL-MCNC: 2.28 MG/DL (ref 1.6–2.4)
MCH RBC QN AUTO: 26.5 PG (ref 26–34)
MCHC RBC AUTO-ENTMCNC: 31.6 G/DL (ref 32–36)
MCV RBC AUTO: 84 FL (ref 80–100)
METHADONE UR CFM-MCNC: <25 NG/ML
MIDAZOLAM UR CFM-MCNC: <25 NG/ML
MONOCYTES # BLD AUTO: 0.7 X10*3/UL (ref 0.1–1)
MONOCYTES NFR BLD AUTO: 7.6 %
MORPHINE UR CFM-MCNC: <50 NG/ML
NEUTROPHILS # BLD AUTO: 6.87 X10*3/UL (ref 1.2–7.7)
NEUTROPHILS NFR BLD AUTO: 75 %
NORDIAZEPAM UR CFM-MCNC: <25 NG/ML
NORFENTANYL UR CFM-MCNC: <2.5 NG/ML
NORHYDROCODONE UR CFM-MCNC: <25 NG/ML
NOROXYCODONE UR CFM-MCNC: >1000 NG/ML
NORTRAMADOL UR-MCNC: <50 NG/ML
NRBC BLD-RTO: 60.6 /100 WBCS (ref 0–0)
OXAZEPAM UR CFM-MCNC: <25 NG/ML
OXYCODONE UR CFM-MCNC: 825 NG/ML
OXYMORPHONE UR CFM-MCNC: >2500 NG/ML
PATH REVIEW-HGB IDENTIFICATION: ABNORMAL
PLATELET # BLD AUTO: 178 X10*3/UL (ref 150–450)
POTASSIUM SERPL-SCNC: 5.5 MMOL/L (ref 3.5–5.3)
PROT SERPL-MCNC: 6.5 G/DL (ref 6.4–8.2)
PROTHROMBIN TIME: 58.8 SECONDS (ref 9.8–12.4)
RBC # BLD AUTO: 3.21 X10*6/UL (ref 4–5.2)
RETICS #: 0.29 X10*6/UL (ref 0.02–0.08)
RETICS/RBC NFR AUTO: 9.1 % (ref 0.5–2)
SODIUM SERPL-SCNC: 136 MMOL/L (ref 136–145)
TEMAZEPAM UR CFM-MCNC: <25 NG/ML
TRAMADOL UR CFM-MCNC: <50 NG/ML
WBC # BLD AUTO: 9.2 X10*3/UL (ref 4.4–11.3)
ZOLPIDEM UR CFM-MCNC: <25 NG/ML
ZOLPIDEM UR-MCNC: <25 NG/ML

## 2025-07-31 PROCEDURE — 2500000001 HC RX 250 WO HCPCS SELF ADMINISTERED DRUGS (ALT 637 FOR MEDICARE OP): Performed by: PHYSICIAN ASSISTANT

## 2025-07-31 PROCEDURE — 83735 ASSAY OF MAGNESIUM: CPT | Performed by: NURSE PRACTITIONER

## 2025-07-31 PROCEDURE — 85610 PROTHROMBIN TIME: CPT | Performed by: PHYSICIAN ASSISTANT

## 2025-07-31 PROCEDURE — 36415 COLL VENOUS BLD VENIPUNCTURE: CPT | Performed by: PHYSICIAN ASSISTANT

## 2025-07-31 PROCEDURE — 2500000004 HC RX 250 GENERAL PHARMACY W/ HCPCS (ALT 636 FOR OP/ED): Mod: JZ,TB

## 2025-07-31 PROCEDURE — 2500000001 HC RX 250 WO HCPCS SELF ADMINISTERED DRUGS (ALT 637 FOR MEDICARE OP)

## 2025-07-31 PROCEDURE — 1200000003 HC ONCOLOGY  ROOM WITH TELEMETRY DAILY

## 2025-07-31 PROCEDURE — 99233 SBSQ HOSP IP/OBS HIGH 50: CPT | Performed by: PHYSICIAN ASSISTANT

## 2025-07-31 PROCEDURE — 2500000004 HC RX 250 GENERAL PHARMACY W/ HCPCS (ALT 636 FOR OP/ED): Performed by: NURSE PRACTITIONER

## 2025-07-31 PROCEDURE — 80053 COMPREHEN METABOLIC PANEL: CPT | Performed by: NURSE PRACTITIONER

## 2025-07-31 PROCEDURE — 85025 COMPLETE CBC W/AUTO DIFF WBC: CPT | Performed by: NURSE PRACTITIONER

## 2025-07-31 PROCEDURE — 83615 LACTATE (LD) (LDH) ENZYME: CPT | Performed by: NURSE PRACTITIONER

## 2025-07-31 PROCEDURE — 2500000004 HC RX 250 GENERAL PHARMACY W/ HCPCS (ALT 636 FOR OP/ED): Mod: JZ,TB | Performed by: PHYSICIAN ASSISTANT

## 2025-07-31 PROCEDURE — 36415 COLL VENOUS BLD VENIPUNCTURE: CPT | Performed by: NURSE PRACTITIONER

## 2025-07-31 PROCEDURE — 85045 AUTOMATED RETICULOCYTE COUNT: CPT | Performed by: NURSE PRACTITIONER

## 2025-07-31 RX ORDER — HYDROMORPHONE HYDROCHLORIDE 1 MG/ML
1 INJECTION, SOLUTION INTRAMUSCULAR; INTRAVENOUS; SUBCUTANEOUS
Status: DISCONTINUED | OUTPATIENT
Start: 2025-07-31 | End: 2025-07-31

## 2025-07-31 RX ORDER — OXYCODONE HYDROCHLORIDE 10 MG/1
10 TABLET ORAL EVERY 4 HOURS PRN
Refills: 0 | Status: DISCONTINUED | OUTPATIENT
Start: 2025-07-31 | End: 2025-08-03

## 2025-07-31 RX ORDER — OXYCODONE HYDROCHLORIDE 10 MG/1
10 TABLET ORAL
Refills: 0 | Status: DISCONTINUED | OUTPATIENT
Start: 2025-07-31 | End: 2025-07-31

## 2025-07-31 RX ADMIN — Medication: at 07:59

## 2025-07-31 RX ADMIN — CYCLOBENZAPRINE 10 MG: 10 TABLET, FILM COATED ORAL at 22:35

## 2025-07-31 RX ADMIN — METOPROLOL TARTRATE 25 MG: 25 TABLET, FILM COATED ORAL at 10:22

## 2025-07-31 RX ADMIN — SODIUM CHLORIDE 250 ML: 9 INJECTION, SOLUTION INTRAVENOUS at 17:09

## 2025-07-31 RX ADMIN — POLYETHYLENE GLYCOL 3350 17 G: 17 POWDER, FOR SOLUTION ORAL at 10:22

## 2025-07-31 RX ADMIN — DIPHENHYDRAMINE HYDROCHLORIDE 25 MG: 25 CAPSULE ORAL at 04:34

## 2025-07-31 RX ADMIN — HYDROMORPHONE HYDROCHLORIDE 2 MG: 2 INJECTION, SOLUTION INTRAMUSCULAR; INTRAVENOUS; SUBCUTANEOUS at 06:37

## 2025-07-31 RX ADMIN — HYDROMORPHONE HYDROCHLORIDE 1 MG: 1 INJECTION, SOLUTION INTRAMUSCULAR; INTRAVENOUS; SUBCUTANEOUS at 10:17

## 2025-07-31 RX ADMIN — CETIRIZINE HYDROCHLORIDE 10 MG: 10 TABLET ORAL at 10:22

## 2025-07-31 RX ADMIN — Medication: at 20:00

## 2025-07-31 RX ADMIN — HYDROMORPHONE HYDROCHLORIDE 2 MG: 2 INJECTION, SOLUTION INTRAMUSCULAR; INTRAVENOUS; SUBCUTANEOUS at 03:30

## 2025-07-31 RX ADMIN — PANTOPRAZOLE SODIUM 40 MG: 40 TABLET, DELAYED RELEASE ORAL at 06:38

## 2025-07-31 RX ADMIN — HYDROMORPHONE HYDROCHLORIDE 1 MG: 1 INJECTION, SOLUTION INTRAMUSCULAR; INTRAVENOUS; SUBCUTANEOUS at 15:31

## 2025-07-31 RX ADMIN — OXYCODONE HYDROCHLORIDE 10 MG: 10 TABLET ORAL at 12:30

## 2025-07-31 RX ADMIN — ONDANSETRON HYDROCHLORIDE 8 MG: 8 TABLET, FILM COATED ORAL at 10:22

## 2025-07-31 RX ADMIN — FOLIC ACID 1 MG: 1 TABLET ORAL at 10:22

## 2025-07-31 RX ADMIN — OXYCODONE HYDROCHLORIDE 10 MG: 10 TABLET ORAL at 20:22

## 2025-07-31 ASSESSMENT — COGNITIVE AND FUNCTIONAL STATUS - GENERAL
MOBILITY SCORE: 23
DAILY ACTIVITIY SCORE: 24
CLIMB 3 TO 5 STEPS WITH RAILING: A LITTLE

## 2025-07-31 ASSESSMENT — PAIN SCALES - GENERAL
PAINLEVEL_OUTOF10: 8
PAINLEVEL_OUTOF10: 8
PAINLEVEL_OUTOF10: 9
PAINLEVEL_OUTOF10: 8
PAINLEVEL_OUTOF10: 9
PAINLEVEL_OUTOF10: 9
PAINLEVEL_OUTOF10: 8
PAINLEVEL_OUTOF10: 8
PAINLEVEL_OUTOF10: 0 - NO PAIN
PAINLEVEL_OUTOF10: 8
PAINLEVEL_OUTOF10: 9

## 2025-07-31 ASSESSMENT — PAIN DESCRIPTION - ORIENTATION
ORIENTATION: RIGHT;LEFT

## 2025-07-31 ASSESSMENT — PAIN DESCRIPTION - LOCATION
LOCATION: LEG

## 2025-07-31 NOTE — SIGNIFICANT EVENT
Rapid Response Nurse Note: RADAR alert: 7    Pager time: [phone]  Arrival time: 817  Event end time: 840  Location: [place] [id number]  [] Triage by phone or secure messaging    Rapid response initiated by:  [] Rapid response RN [] Family [] Nursing Supervisor [] Physician   [x] RADAR auto page [] Sepsis auto-page [] RN [] RT   [] NP/PA [] Other:     Primary reason for call:   [] BAT [] New CPAP/BiPAP [] Bleeding [] Change in mental status   [] Chest pain [] Code blue [] FiO2 >/= 50% [] HR </= 40 bpm   [] HR >/= 130 bpm [] Hyperglycemia [] Hypoglycemia [x] RADAR    [] RR </= 8 bpm [] RR >/= 30 bpm [] SBP </= 90 mmHg [] SpO2 < 90%   [] Seizure [] Sepsis [] Shortness of breath  [] Staff concern: see comments     Initial VS and/or RADAR VS: T 35.7 °C; HR 69; RR 16; BP 70/49; SPO2 94%.    Providers present at bedside (if applicable): Leanne Gonzales PA-C    Interventions:  [] None [] ABG/VBG [] Assist w/ICU transfer [] BAT paged    [] Bag mask [] Blood [] Cardioversion [] Code Blue   [] Code blue for intubation [] Code status changed [] Chest x-ray [] EKG   [] IV fluid/bolus [] KUB x-ray [] Labs/cultures [] Medication   [] Nebulizer treatment [] NIPPV (CPAP/BiPAP) [] Oxygen [] Oral airway   [] Peripheral IV [] Palliative care consult [] CT/MRI [] Sepsis protocol    [] Suctioned [x] Other: adjusted pain medication; lasix held     Outcome:  [] Coded and  [] Code blue for intubation [] Coded and transferred to ICU []  on division   [x] Remained on division (no change) [] Remained on division + additional monitoring [] Remained in ED [] Transferred to ED   [] Transferred to ICU [] Transferred to inpatient status [] Transferred for interventions (procedure) [] Transferred to ICU stepdown    [] Transferred to surgery [] Transferred to telemetry [] Sepsis protocol [] STEMI protocol   [] Stroke protocol [x] Bedside nurse instructed to page rapid response for any concerns or acute change in condition/VS     Additional  Comments: RADAR auto generated page received by Rapid Response Team.  Upon arrival to division, spoke with Bedside RN MICHELLE Lozano.  VS reviewed.  Repeat manual BP taken by this here Rapid Response Team RN & Bedside RN from left upper arm; BP noted to be 78/52.  Patient sleepy, but appropriately responsive.  Patient denies dizziness and lightheadedness.  Patient requesting pain medication and aware pain medication will be paused until BP returns to baseline.  SLIM Gonzales at bedside and patient assessed per VENU.  Pain medications adjusted and lasix held.      1030 - follow up via EPIC secure messaging with bedside RN MICHELLE Lozano.  Patient with improved VS - BP returning to baseline at 96/67.  Patient more awake.  No further interventions required of Rapid Response RN at present time.  RN encouraged to page Rapid Response if further concerns arise in patient condition.

## 2025-07-31 NOTE — CARE PLAN
Patient afebrile. Bp low. Henrietta sleepy. Holding metop and lasix. Stopped iv pain meds. Only on oxy. Got one small bolus. Will continue to monitor.

## 2025-07-31 NOTE — CARE PLAN
The patient's goals for the shift include      The clinical goals for the shift include pt will remain HDS and VSS throughout shift end on 7/31/25 @0700      Problem: Pain - Adult  Goal: Verbalizes/displays adequate comfort level or baseline comfort level  Outcome: Progressing     Problem: Safety - Adult  Goal: Free from fall injury  Outcome: Progressing     Problem: Discharge Planning  Goal: Discharge to home or other facility with appropriate resources  Outcome: Progressing     Problem: Chronic Conditions and Co-morbidities  Goal: Patient's chronic conditions and co-morbidity symptoms are monitored and maintained or improved  Outcome: Progressing     Problem: Nutrition  Goal: Nutrient intake appropriate for maintaining nutritional needs  Outcome: Progressing     Problem: Pain  Goal: Takes deep breaths with improved pain control throughout the shift  Outcome: Progressing  Goal: Turns in bed with improved pain control throughout the shift  Outcome: Progressing  Goal: Walks with improved pain control throughout the shift  Outcome: Progressing  Goal: Performs ADL's with improved pain control throughout shift  Outcome: Progressing  Goal: Participates in PT with improved pain control throughout the shift  Outcome: Progressing  Goal: Free from opioid side effects throughout the shift  Outcome: Progressing  Goal: Free from acute confusion related to pain meds throughout the shift  Outcome: Progressing     Problem: Fall/Injury  Goal: Not fall by end of shift  Outcome: Progressing  Goal: Be free from injury by end of the shift  Outcome: Progressing  Goal: Verbalize understanding of personal risk factors for fall in the hospital  Outcome: Progressing  Goal: Verbalize understanding of risk factor reduction measures to prevent injury from fall in the home  Outcome: Progressing  Goal: Use assistive devices by end of the shift  Outcome: Progressing  Goal: Pace activities to prevent fatigue by end of the shift  Outcome:  Progressing

## 2025-07-31 NOTE — DOCUMENTATION CLARIFICATION NOTE
"    PATIENT:               KIRSTY MARSHALL  ACCT #:                  [id number]  MRN:                       [id number]  :                       1970  ADMIT DATE:       2025 3:38 PM  DISCH DATE:  RESPONDING PROVIDER #:        [id number]          PROVIDER RESPONSE TEXT:    Acute on Chronic Diastolic Congestive Heart Failure    CDI QUERY TEXT:    Clarification        Instruction:    Based on your assessment of the patient and the clinical information, please provide the requested documentation by clicking on the appropriate radio button and enter any additional information if prompted.    Question: Please further clarify the type and acuity of congestive heart failure    When answering this query, please exercise your independent professional judgment. The fact that a question is being asked, does not imply that any particular answer is desired or expected.    The patient's clinical indicators include:  Clinical Information:  56 y/o female admitted with sickle cell crisis.      Clinical Indicators:  The  Hematology Progress Note, MATIAS Hunt CNP:  \"...presented to outpt sickle cell apt  and found to be hypoxic with spo2 80s and sent to the ED. She was placed on 3L NC in ED.\"  \"CXR () pulm edema, fullness to the hilia could be r/t dilated pulm arterial tree 2/2 pulm HTN.\"  \"...admitted for further management of hypoxia and pain.\"  \"HFpEF\"  \"-Last TTE 25: HFpEF with EF of 67% right ventricular overloaded, severely enlarged RV  - Troponin leak likely iso demand  - Continue Lasix 20mg every other day  - CXR (): Pulm edema, fullness to the hilia could be r/t dilated pulm arterial tree 2/2 pulm HTN  - s/p 20 mg IV lasix in ED  - Will give IV Lasix PRN in addition to home PO dosing every other day\"  \"Supplemental O2 use  - Continue home 2L NC and CPAP at night  - Currently on 3L NC as of \"      Treatment:  -CXR  -IV Lasix 20mg in ED  -Lasix 20mg every other day  -IV Lasix PRN  -3L O2 via NC      Risk " Factors:  -Cardiomyopathy  -Pulmonary HTN  -Pulmonary edema  -CAN  Options provided:  -- Acute on Chronic Diastolic Congestive Heart Failure  -- Chronic Diastolic Congestive Heart Failure  -- Other - I will add my own diagnosis  -- Refer to Clinical Documentation Reviewer    Query created by: Leda Thao on 7/29/2025 4:54 PM      Electronically signed by:  ALEXANDRA MARIN 7/31/2025 2:53 PM

## 2025-07-31 NOTE — SIGNIFICANT EVENT
Patient does not meet criteria for inpatient mammogram.  Primary team will reschedule outpatient.

## 2025-07-31 NOTE — SIGNIFICANT EVENT
.Rapid Response Nurse Note: RADAR alert: 7    Pager time:   Arrival time:   Event end time:   Location: [place] [id number]  [] Triage by phone or secure messaging    Rapid response initiated by:  [] Rapid response RN [] Family [] Nursing Supervisor [] Physician   [x] RADAR auto page [] Sepsis auto-page [] RN [] RT   [] NP/PA [] Other:     Primary reason for call:   [] BAT [] New CPAP/BiPAP [] Bleeding [] Change in mental status   [] Chest pain [] Code blue [] FiO2 >/= 50% [] HR </= 40 bpm   [] HR >/= 130 bpm [] Hyperglycemia [] Hypoglycemia [] RADAR    [] RR </= 8 bpm [] RR >/= 30 bpm [] SBP </= 90 mmHg [] SpO2 < 90%   [] Seizure [] Sepsis [] Shortness of breath  [x] Staff concern: see comments     Initial VS and/or RADAR VS: T 36 °C; HR 62; RR 18; BP 89/60; SPO2 94%.        Interventions:  [x] None [] ABG/VBG [] Assist w/ICU transfer [] BAT paged    [] Bag mask [] Blood [] Cardioversion [] Code Blue   [] Code blue for intubation [] Code status changed [] Chest x-ray [] EKG   [] IV fluid/bolus [] KUB x-ray [] Labs/cultures [] Medication   [] Nebulizer treatment [] NIPPV (CPAP/BiPAP) [] Oxygen [] Oral airway   [] Peripheral IV [] Palliative care consult [] CT/MRI [] Sepsis protocol    [] Suctioned [] Other:     Outcome:  [] Coded and  [] Code blue for intubation [] Coded and transferred to ICU []  on division   [x] Remained on division (no change) [] Remained on division + additional monitoring [] Remained in ED [] Transferred to ED   [] Transferred to ICU [] Transferred to inpatient status [] Transferred for interventions (procedure) [] Transferred to ICU stepdown    [] Transferred to surgery [] Transferred to telemetry [] Sepsis protocol [] STEMI protocol   [] Stroke protocol [x] Bedside nurse instructed to page rapid response for any concerns or acute change in condition/VS     Additional Comments: Upon arrival to room patient found sleeping in bed-patient awakens to voice, acknowledges that she is  tired.  Primary Team notified of soft BP - IV fluid bolus 250 mls to be given over one hour.  Nursing to contact Rapid Response Team with any further issues or patient deterioration.

## 2025-07-31 NOTE — PROGRESS NOTES
"Senait Narvaez is a 55 y.o. female on day 3 of admission presenting with Sickle cell anemia with pain.    Subjective   Seen this morning at bedside, per nursing staff noted to have low BP, BP measurement done  at bedside shows SBP 97, patient asymptomatic. Still c/o pain in her back, discussed rotating pain regimen today and plan discharge tomorrow pending INR.  Eating and drinking well having bowel movements. Patient is still on O2, plan to wean off. Denies chest pain, SOB, abd pain.       Objective     Physical Exam  Constitutional:       Appearance: Normal appearance. She is obese.   HENT:      Head: Normocephalic.      Mouth/Throat:      Mouth: Mucous membranes are moist.     Eyes:      Pupils: Pupils are equal, round, and reactive to light.       Cardiovascular:      Rate and Rhythm: Normal rate and regular rhythm.      Pulses: Normal pulses.      Heart sounds: Normal heart sounds.   Pulmonary:      Effort: Pulmonary effort is normal.      Breath sounds: Normal breath sounds.      Comments: 2L NC in place  Abdominal:      General: Abdomen is flat. There is no distension.      Palpations: Abdomen is soft.      Tenderness: There is no abdominal tenderness. There is no guarding.     Musculoskeletal:         General: Normal range of motion.      Cervical back: Normal range of motion and neck supple.      Right lower leg: No edema.      Left lower leg: No edema.     Skin:     General: Skin is warm.      Comments: Femoral apharesis line in place      Neurological:      General: No focal deficit present.      Mental Status: She is alert.         Last Recorded Vitals  Blood pressure (!) 82/49, pulse 72, temperature 35.3 °C (95.5 °F), temperature source Temporal, resp. rate 16, height 1.651 m (5' 5\"), weight 98.9 kg (218 lb), SpO2 95%.  Intake/Output last 3 Shifts:  I/O last 3 completed shifts:  In: - (0 mL/kg)   Out: 300 (3 mL/kg) [Urine:300 (0.1 mL/kg/hr)]  Weight: 98.9 kg     Relevant Results  ECG 12 lead  Result Date: " 7/29/2025  Normal sinus rhythm Possible Left atrial enlargement Right axis deviation T wave abnormality, consider lateral ischemia Abnormal ECG When compared with ECG of 28-JUL-2025 23:11, (unconfirmed) No significant change was found Confirmed by Jonathan Rajput ([id number]) on 7/29/2025 4:21:36 PM    IR CVC nontunneled  Result Date: 7/29/2025    1. Technically successful and uncomplicated placement of a right common femoral temporary dialysis catheter under direct ultrasound and fluoroscopic guidance - optimal catheter tip position at the right atrial superior vena caval junction and the catheter is ready for immediate use.   I was present for and/or performed the critical portions of the procedure and immediately available throughout the entire procedure.   I personally reviewed the image(s) / study and resident interpretation. I agree with the findings as stated.   Performed and dictated at [facility].   MACRO: None.   Signed by: Baljit Jackson 7/29/2025 2:20 PM Dictation workstation:   [id number]    ECG 12 lead  Result Date: 7/28/2025  Normal sinus rhythm Right ventricular hypertrophy ST & T wave abnormality, consider anterolateral ischemia Abnormal ECG When compared with ECG of 17-JUN-2025 09:55, T wave inversion more evident in Lateral leads See ED provider note for full interpretation and clinical correlation Confirmed by Fatou Cano ([id number]) on 7/28/2025 10:09:48 PM    CT head wo IV contrast  Result Date: 7/28/2025   No evidence acute intracranial hemorrhage or of large territorial infarct.   I personally reviewed the images/study and I agree with the findings as stated by Carline Vickers MD, PGY-2 this study was interpreted at [facility], [place], [place].   MACRO: None.   Signed by: Eric Sears 7/28/2025 5:39 PM Dictation workstation:   [id number]    XR chest 2 views  Result Date: 7/28/2025  Findings compatible with a degree of pulmonary  edema. A component of the fullness to the nir could be related to dilated pulmonary arterial tree secondary to pulmonary arterial hypertension.   MACRO: None   Signed by: Sai Joshi 7/28/2025 4:47 PM Dictation workstation:   [id number]    This patient has a central line   Reason for the central line remaining today? RBCex    Assessment & Plan  Sickle cell anemia with pain    Senait Narvaez is a 55 y.o. 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 She was placed on 3L NC in ED. She reported sickle cell pain and HA--> CT head (7/28) negative. CXR (7/28) pulm edema, fullness to the hilia could be r/t dilated pulm arterial tree 2/2 pulm HTN. She was admitted for further management of hypoxia and pain. Hgb and lysis labs initially stable on admit, elevate on 7/30. Started on IV dilaudid for pain management. Pt went into symptomatic (hypotensive) SVT over night 7/28-7/29, improved with vagal maneuvers. She was due for outpt RBCEx 7/31, completed inpatient on 7/29. DC pending improvement in pain and INR.      Updates 7/31:   - S/p RBCex on 7/29, still with lingering pain, plan to rotate meds today   - Cr 2.74, increased from baseline, no additional fluids today, encourage PO hydration      # Hb SC disease with acute on chronic pain  - Presented to ED with uncontrolled typical sickle cell pain and desaturation and was placed on 3L of O2  -Currently on q6 week RBC exchange transfusions. Last transfusion was on 6/19. Next exchange transfusions as scheduled  on 7/31/2025--> s/p RBCex on 7/29  - [facility] reviewed, last updated 7/28/25: IV  Dilaudid 1-1.5 mg every 2-3 hours prn for severe pain  and 0.75-1 mg every 2-3 hrs for moderate pain.  - OARRS reviewed, no aberrant behavior noted    - Hgb BL ~8, Hgb 9.5 on admission (7/28)--> 9.1 (7/30)- no indication for simple transfusion   - Tbili BL ~ 1.0,Tbili 4.4 on admission (7/28)--> 3.2 (7/30)   - LDH ~150-200,  on admission (7/28)--> 193 (7/30)   - CXR (7/28): pulm edema, fullness to the hilia could be r/t dilated pulm arterial tree 2/2 pulm HTN  - CT head (7/28): negative for acute process  - For pain management will rotate IV Dilaudid 1mg q3hrs PRN with Oxycodone 10mg q3hrs PRN   - Continue Folic Acid 1mg daily and Cyclobenzaprine 10mg TID PRN muscle spasms  - Bowel regimen for opioid induced constipation with DocuSenna 2tabs BID and Miralax daily  - PO Benadryl 25 mg q6h PRN for opioid-induced pruritus, PO Zofran 8 mg q8h PRN for opioid-induced nausea   - Utox (7/28): pending  - Hgb S (7/28): 27.1%, Post-exchange Hgb S -12%  - Exchange labs ordered 7/28  - CRP 4.87/ESR 20 (7/29)  - IS encouraged       # CKD II  - sCr BL fluctuates but ~1; sCr 1.73 (7/29)--> 2.10 (7/30) --> 2.74 (7/31)   - UA - 2+ bacteria (7/29), culture contaminated   - Urine electrolytes on 7/29 indication pre-renal etiology   - per attending plan for 250mL bolus on 7/30 as pt with history of fluid overload and pulmonary edema noted on CXR 7/28     # Acute SVT with hx SVT  - Over night 7/28-7/29 pt with episode of SVT HR 180s, improved spontaneously with vagal maneuvers  - EKG (7/28): showing SVT  - Repeat EKG pending 7/29  - Pt with hx of SVT and follows with cardiology  - Continue home Metop Tartrate 25mg BID  - She was discharged from last hospital admission with holter monitor for palpitations and SVT, which has been controlled with vagal maneuvers  - Continue telemetry monitoring  - Cardiology FUV 8/25     # Known L Breast Mass  - Saw Gracy Barr PA-C outpt on 1/17/25  - Imaging showing likely benign L breast mass  with plan for repeat b/l mammogram around 7/29/25  - Planned for repeat imaging outpatient. Patient will follow-up with surg onc FUV 8/1     # HFpEF   # pHTN   # Hx of troponinemia   - Last TTE 5/9/25: HFpEF with EF of 67% right ventricular overloaded, severely enlarged RV    - Troponin leak likely iso demand   - Continue Lasix 20mg every other day  - CXR (7/28): Pulm edema, fullness to the hilia could be r/t dilated pulm arterial tree 2/2 pulm HTN  - s/p 20 mg IV lasix in ED  - Will give IV Lasix PRN in addition to home PO dosing every other day     # Hx of DVT/PE   # Hx of SVC syndrom  - Continue home Warfarin 5mg daily  - INR 2.9 (7/29) --> 3.5 (7/30)  - INR today pending   - Daily INRs     # CAN  # Supplemental O2 use  - Continue home 2L NC and CPAP at night  - Currently on 3L NC as of 7/29     # DVT Prophy:   - Therapeutic Warfarin, SCDs, encourage safe ambulation     # DISPO:  - Full code  - DC pending improvement in pain, improvement of sCr likely 8/1  - Surg Onc FUV 8/1 (may need to be rescheduled if she's still admitted), Ophthalmology FUV 8/6, Cardiology FUV 8/25, SS FUV requested and pending as of 7/30         I spent 60 minutes in the professional and overall care of this patient.    Assessment and plan as above discussed with attending physician, Dr. Tayo Gonzales PA-C

## 2025-08-01 ENCOUNTER — APPOINTMENT (OUTPATIENT)
Dept: SURGICAL ONCOLOGY | Facility: HOSPITAL | Age: 55
End: 2025-08-01
Payer: COMMERCIAL

## 2025-08-01 LAB
ALBUMIN SERPL BCP-MCNC: 3.2 G/DL (ref 3.4–5)
ALP SERPL-CCNC: 135 U/L (ref 33–110)
ALT SERPL W P-5'-P-CCNC: 7 U/L (ref 7–45)
ANION GAP SERPL CALC-SCNC: 10 MMOL/L (ref 10–20)
AST SERPL W P-5'-P-CCNC: 10 U/L (ref 9–39)
BASOPHILS # BLD MANUAL: 0 X10*3/UL (ref 0–0.1)
BASOPHILS NFR BLD MANUAL: 0 %
BILIRUB SERPL-MCNC: 2.5 MG/DL (ref 0–1.2)
BLASTS # BLD MANUAL: 0 X10*3/UL
BLASTS NFR BLD MANUAL: 0 %
BUN SERPL-MCNC: 30 MG/DL (ref 6–23)
CALCIUM SERPL-MCNC: 8.3 MG/DL (ref 8.6–10.6)
CHLORIDE SERPL-SCNC: 98 MMOL/L (ref 98–107)
CO2 SERPL-SCNC: 32 MMOL/L (ref 21–32)
CREAT SERPL-MCNC: 2.15 MG/DL (ref 0.5–1.05)
EGFRCR SERPLBLD CKD-EPI 2021: 26 ML/MIN/1.73M*2
EOSINOPHIL # BLD MANUAL: 0.34 X10*3/UL (ref 0–0.7)
EOSINOPHIL NFR BLD MANUAL: 4.4 %
ERYTHROCYTE [DISTWIDTH] IN BLOOD BY AUTOMATED COUNT: 25.6 % (ref 11.5–14.5)
GLUCOSE SERPL-MCNC: 87 MG/DL (ref 74–99)
HCT VFR BLD AUTO: 27 % (ref 36–46)
HGB BLD-MCNC: 8.6 G/DL (ref 12–16)
HGB RETIC QN: 23 PG (ref 28–38)
HYPOCHROMIA BLD QL SMEAR: NORMAL
IMM GRANULOCYTES # BLD AUTO: 0.02 X10*3/UL (ref 0–0.7)
IMM GRANULOCYTES NFR BLD AUTO: 0.3 % (ref 0–0.9)
IMMATURE RETIC FRACTION: 23.7 %
INR PPP: 4.8 (ref 0.9–1.1)
LDH SERPL L TO P-CCNC: 180 U/L (ref 84–246)
LYMPHOCYTES # BLD MANUAL: 1.23 X10*3/UL (ref 1.2–4.8)
LYMPHOCYTES NFR BLD MANUAL: 15.8 %
MCH RBC QN AUTO: 27 PG (ref 26–34)
MCHC RBC AUTO-ENTMCNC: 31.9 G/DL (ref 32–36)
MCV RBC AUTO: 85 FL (ref 80–100)
METAMYELOCYTES # BLD MANUAL: 0 X10*3/UL
METAMYELOCYTES NFR BLD MANUAL: 0 %
MONOCYTES # BLD MANUAL: 0.27 X10*3/UL (ref 0.1–1)
MONOCYTES NFR BLD MANUAL: 3.5 %
MYELOCYTES # BLD MANUAL: 0 X10*3/UL
MYELOCYTES NFR BLD MANUAL: 0 %
NEUTROPHILS # BLD MANUAL: 5.95 X10*3/UL (ref 1.2–7.7)
NEUTS BAND # BLD MANUAL: 0 X10*3/UL (ref 0–0.7)
NEUTS BAND NFR BLD MANUAL: 0 %
NEUTS SEG # BLD MANUAL: 5.95 X10*3/UL (ref 1.2–7)
NEUTS SEG NFR BLD MANUAL: 76.3 %
NRBC BLD MANUAL-RTO: 0 % (ref 0–0)
NRBC BLD-RTO: 4.6 /100 WBCS (ref 0–0)
PLASMA CELLS # BLD MANUAL: 0 X10*3/UL
PLASMA CELLS NFR BLD MANUAL: 0 %
PLATELET # BLD AUTO: 216 X10*3/UL (ref 150–450)
POLYCHROMASIA BLD QL SMEAR: NORMAL
POTASSIUM SERPL-SCNC: 4.7 MMOL/L (ref 3.5–5.3)
PROMYELOCYTES # BLD MANUAL: 0 X10*3/UL
PROMYELOCYTES NFR BLD MANUAL: 0 %
PROT SERPL-MCNC: 6.3 G/DL (ref 6.4–8.2)
PROTHROMBIN TIME: 52.8 SECONDS (ref 9.8–12.4)
RBC # BLD AUTO: 3.18 X10*6/UL (ref 4–5.2)
RBC MORPH BLD: NORMAL
RETICS #: 0.37 X10*6/UL (ref 0.02–0.08)
RETICS/RBC NFR AUTO: 11.7 % (ref 0.5–2)
SICKLE CELLS BLD QL SMEAR: NORMAL
SODIUM SERPL-SCNC: 135 MMOL/L (ref 136–145)
TARGETS BLD QL SMEAR: NORMAL
TOTAL CELLS COUNTED BLD: 114
VARIANT LYMPHS # BLD MANUAL: 0 X10*3/UL (ref 0–0.5)
VARIANT LYMPHS NFR BLD: 0 %
WBC # BLD AUTO: 7.8 X10*3/UL (ref 4.4–11.3)
WBC OTHER # BLD MANUAL: 0 X10*3/UL
WBC OTHER NFR BLD MANUAL: 0 %

## 2025-08-01 PROCEDURE — 84075 ASSAY ALKALINE PHOSPHATASE: CPT | Performed by: PHYSICIAN ASSISTANT

## 2025-08-01 PROCEDURE — 85007 BL SMEAR W/DIFF WBC COUNT: CPT | Performed by: PHYSICIAN ASSISTANT

## 2025-08-01 PROCEDURE — 2500000005 HC RX 250 GENERAL PHARMACY W/O HCPCS

## 2025-08-01 PROCEDURE — 85610 PROTHROMBIN TIME: CPT | Performed by: PHYSICIAN ASSISTANT

## 2025-08-01 PROCEDURE — 1170000001 HC PRIVATE ONCOLOGY ROOM DAILY

## 2025-08-01 PROCEDURE — 2500000004 HC RX 250 GENERAL PHARMACY W/ HCPCS (ALT 636 FOR OP/ED): Mod: JZ,TB | Performed by: PHYSICIAN ASSISTANT

## 2025-08-01 PROCEDURE — 85045 AUTOMATED RETICULOCYTE COUNT: CPT | Performed by: PHYSICIAN ASSISTANT

## 2025-08-01 PROCEDURE — 2500000001 HC RX 250 WO HCPCS SELF ADMINISTERED DRUGS (ALT 637 FOR MEDICARE OP): Performed by: PHYSICIAN ASSISTANT

## 2025-08-01 PROCEDURE — 36415 COLL VENOUS BLD VENIPUNCTURE: CPT | Performed by: PHYSICIAN ASSISTANT

## 2025-08-01 PROCEDURE — 83615 LACTATE (LD) (LDH) ENZYME: CPT | Performed by: PHYSICIAN ASSISTANT

## 2025-08-01 PROCEDURE — 85027 COMPLETE CBC AUTOMATED: CPT | Performed by: PHYSICIAN ASSISTANT

## 2025-08-01 PROCEDURE — 99233 SBSQ HOSP IP/OBS HIGH 50: CPT | Performed by: PHYSICIAN ASSISTANT

## 2025-08-01 PROCEDURE — 2500000001 HC RX 250 WO HCPCS SELF ADMINISTERED DRUGS (ALT 637 FOR MEDICARE OP)

## 2025-08-01 RX ORDER — HYDROMORPHONE HYDROCHLORIDE 1 MG/ML
1 INJECTION, SOLUTION INTRAMUSCULAR; INTRAVENOUS; SUBCUTANEOUS EVERY 6 HOURS PRN
Status: DISCONTINUED | OUTPATIENT
Start: 2025-08-01 | End: 2025-08-04

## 2025-08-01 RX ADMIN — CETIRIZINE HYDROCHLORIDE 10 MG: 10 TABLET ORAL at 09:59

## 2025-08-01 RX ADMIN — Medication 1 DOSE: at 19:12

## 2025-08-01 RX ADMIN — OXYCODONE HYDROCHLORIDE 10 MG: 10 TABLET ORAL at 09:58

## 2025-08-01 RX ADMIN — Medication 2 APPLICATION: at 20:17

## 2025-08-01 RX ADMIN — FOLIC ACID 1 MG: 1 TABLET ORAL at 09:58

## 2025-08-01 RX ADMIN — HYDROMORPHONE HYDROCHLORIDE 1 MG: 1 INJECTION, SOLUTION INTRAMUSCULAR; INTRAVENOUS; SUBCUTANEOUS at 12:07

## 2025-08-01 RX ADMIN — OXYCODONE HYDROCHLORIDE 10 MG: 10 TABLET ORAL at 05:09

## 2025-08-01 RX ADMIN — DIPHENHYDRAMINE HYDROCHLORIDE 25 MG: 25 CAPSULE ORAL at 18:26

## 2025-08-01 RX ADMIN — PANTOPRAZOLE SODIUM 40 MG: 40 TABLET, DELAYED RELEASE ORAL at 05:09

## 2025-08-01 RX ADMIN — HYDROMORPHONE HYDROCHLORIDE 1 MG: 1 INJECTION, SOLUTION INTRAMUSCULAR; INTRAVENOUS; SUBCUTANEOUS at 18:18

## 2025-08-01 RX ADMIN — OXYCODONE HYDROCHLORIDE 10 MG: 10 TABLET ORAL at 16:26

## 2025-08-01 RX ADMIN — OXYCODONE HYDROCHLORIDE 10 MG: 10 TABLET ORAL at 00:22

## 2025-08-01 RX ADMIN — METOPROLOL TARTRATE 25 MG: 25 TABLET, FILM COATED ORAL at 20:17

## 2025-08-01 RX ADMIN — OXYCODONE HYDROCHLORIDE 10 MG: 10 TABLET ORAL at 20:23

## 2025-08-01 ASSESSMENT — COGNITIVE AND FUNCTIONAL STATUS - GENERAL
DAILY ACTIVITIY SCORE: 24
MOBILITY SCORE: 24

## 2025-08-01 ASSESSMENT — PAIN SCALES - GENERAL
PAINLEVEL_OUTOF10: 7
PAINLEVEL_OUTOF10: 8
PAINLEVEL_OUTOF10: 5 - MODERATE PAIN
PAINLEVEL_OUTOF10: 5 - MODERATE PAIN
PAINLEVEL_OUTOF10: 8
PAINLEVEL_OUTOF10: 8
PAINLEVEL_OUTOF10: 5 - MODERATE PAIN
PAINLEVEL_OUTOF10: 8

## 2025-08-01 ASSESSMENT — PAIN - FUNCTIONAL ASSESSMENT
PAIN_FUNCTIONAL_ASSESSMENT: 0-10
PAIN_FUNCTIONAL_ASSESSMENT: UNABLE TO SELF-REPORT
PAIN_FUNCTIONAL_ASSESSMENT: 0-10
PAIN_FUNCTIONAL_ASSESSMENT: UNABLE TO SELF-REPORT
PAIN_FUNCTIONAL_ASSESSMENT: 0-10
PAIN_FUNCTIONAL_ASSESSMENT: UNABLE TO SELF-REPORT
PAIN_FUNCTIONAL_ASSESSMENT: 0-10

## 2025-08-01 NOTE — CARE PLAN
The patient's goals for the shift include      The clinical goals for the shift include pt will remain free of injury    Problem: Pain - Adult  Goal: Verbalizes/displays adequate comfort level or baseline comfort level  Outcome: Progressing     Problem: Safety - Adult  Goal: Free from fall injury  Outcome: Progressing     Problem: Discharge Planning  Goal: Discharge to home or other facility with appropriate resources  Outcome: Progressing     Problem: Chronic Conditions and Co-morbidities  Goal: Patient's chronic conditions and co-morbidity symptoms are monitored and maintained or improved  Outcome: Progressing     Problem: Nutrition  Goal: Nutrient intake appropriate for maintaining nutritional needs  Outcome: Progressing     Problem: Pain  Goal: Takes deep breaths with improved pain control throughout the shift  Outcome: Progressing  Goal: Turns in bed with improved pain control throughout the shift  Outcome: Progressing  Goal: Walks with improved pain control throughout the shift  Outcome: Progressing  Goal: Performs ADL's with improved pain control throughout shift  Outcome: Progressing  Goal: Participates in PT with improved pain control throughout the shift  Outcome: Progressing  Goal: Free from opioid side effects throughout the shift  Outcome: Progressing  Goal: Free from acute confusion related to pain meds throughout the shift  Outcome: Progressing     Problem: Fall/Injury  Goal: Not fall by end of shift  Outcome: Progressing  Goal: Be free from injury by end of the shift  Outcome: Progressing  Goal: Verbalize understanding of personal risk factors for fall in the hospital  Outcome: Progressing  Goal: Verbalize understanding of risk factor reduction measures to prevent injury from fall in the home  Outcome: Progressing  Goal: Use assistive devices by end of the shift  Outcome: Progressing  Goal: Pace activities to prevent fatigue by end of the shift  Outcome: Progressing

## 2025-08-01 NOTE — CARE PLAN
The clinical goals for the shift include patient will rate a pain a 7 or less throughout shift 8/1 at 1900      Problem: Pain - Adult  Goal: Verbalizes/displays adequate comfort level or baseline comfort level  Outcome: Progressing     Problem: Safety - Adult  Goal: Free from fall injury  Outcome: Progressing     Problem: Chronic Conditions and Co-morbidities  Goal: Patient's chronic conditions and co-morbidity symptoms are monitored and maintained or improved  Outcome: Progressing     Problem: Pain  Goal: Takes deep breaths with improved pain control throughout the shift  Outcome: Progressing     Problem: Pain  Goal: Walks with improved pain control throughout the shift  Outcome: Progressing     Problem: Pain  Goal: Performs ADL's with improved pain control throughout shift  Outcome: Progressing     Problem: Fall/Injury  Goal: Not fall by end of shift  Outcome: Progressing

## 2025-08-01 NOTE — CARE PLAN
The patient's goals for the shift include      The clinical goals for the shift include Pt will remain HDS and VSS throughout shift end on 8/1/25 @0700      Problem: Pain - Adult  Goal: Verbalizes/displays adequate comfort level or baseline comfort level  Outcome: Progressing     Problem: Safety - Adult  Goal: Free from fall injury  Outcome: Progressing     Problem: Discharge Planning  Goal: Discharge to home or other facility with appropriate resources  Outcome: Progressing     Problem: Chronic Conditions and Co-morbidities  Goal: Patient's chronic conditions and co-morbidity symptoms are monitored and maintained or improved  Outcome: Progressing     Problem: Nutrition  Goal: Nutrient intake appropriate for maintaining nutritional needs  Outcome: Progressing     Problem: Pain  Goal: Takes deep breaths with improved pain control throughout the shift  Outcome: Progressing  Goal: Turns in bed with improved pain control throughout the shift  Outcome: Progressing  Goal: Walks with improved pain control throughout the shift  Outcome: Progressing  Goal: Performs ADL's with improved pain control throughout shift  Outcome: Progressing  Goal: Participates in PT with improved pain control throughout the shift  Outcome: Progressing  Goal: Free from opioid side effects throughout the shift  Outcome: Progressing  Goal: Free from acute confusion related to pain meds throughout the shift  Outcome: Progressing     Problem: Fall/Injury  Goal: Not fall by end of shift  Outcome: Progressing  Goal: Be free from injury by end of the shift  Outcome: Progressing  Goal: Verbalize understanding of personal risk factors for fall in the hospital  Outcome: Progressing  Goal: Verbalize understanding of risk factor reduction measures to prevent injury from fall in the home  Outcome: Progressing  Goal: Use assistive devices by end of the shift  Outcome: Progressing  Goal: Pace activities to prevent fatigue by end of the shift  Outcome:  Progressing

## 2025-08-01 NOTE — PROGRESS NOTES
"Senait Narvaez is a 55 y.o. female on day 4 of admission presenting with Sickle cell anemia with pain.    Subjective   Seen this morning at bedside, per nursing staff noted to have low BP, BP measurement done  at bedside shows SBP 97, patient asymptomatic. Still c/o pain in her back, discussed rotating pain regimen today and plan discharge tomorrow pending INR.  Eating and drinking well having bowel movements. Patient is still on O2, plan to wean off. Denies chest pain, SOB, abd pain.       Objective     Physical Exam  Constitutional:       Appearance: Normal appearance. She is obese.   HENT:      Head: Normocephalic.      Mouth/Throat:      Mouth: Mucous membranes are moist.     Eyes:      Pupils: Pupils are equal, round, and reactive to light.       Cardiovascular:      Rate and Rhythm: Normal rate and regular rhythm.      Pulses: Normal pulses.      Heart sounds: Normal heart sounds.   Pulmonary:      Effort: Pulmonary effort is normal.      Breath sounds: Normal breath sounds.      Comments: 1.5 L NC in place  Abdominal:      General: Abdomen is flat. There is no distension.      Palpations: Abdomen is soft.      Tenderness: There is no abdominal tenderness. There is no guarding.     Musculoskeletal:         General: Normal range of motion.      Cervical back: Normal range of motion and neck supple.      Right lower leg: No edema.      Left lower leg: No edema.     Skin:     General: Skin is warm.      Comments: Femoral apharesis line in place      Neurological:      General: No focal deficit present.      Mental Status: She is alert.         Last Recorded Vitals  Blood pressure 96/55, pulse 66, temperature 35.9 °C (96.6 °F), temperature source Temporal, resp. rate 16, height 1.651 m (5' 5\"), weight 98.9 kg (218 lb), SpO2 100%.  Intake/Output last 3 Shifts:  I/O last 3 completed shifts:  In: 300 (3 mL/kg) [IV Piggyback:300]  Out: 300 (3 mL/kg) [Urine:300 (0.1 mL/kg/hr)]  Weight: 98.9 kg     Relevant Results  ECG 12 " lead  Result Date: 7/29/2025  Normal sinus rhythm Possible Left atrial enlargement Right axis deviation T wave abnormality, consider lateral ischemia Abnormal ECG When compared with ECG of 28-JUL-2025 23:11, (unconfirmed) No significant change was found Confirmed by Jonathan Rajput ([id number]) on 7/29/2025 4:21:36 PM    IR CVC nontunneled  Result Date: 7/29/2025    1. Technically successful and uncomplicated placement of a right common femoral temporary dialysis catheter under direct ultrasound and fluoroscopic guidance - optimal catheter tip position at the right atrial superior vena caval junction and the catheter is ready for immediate use.   I was present for and/or performed the critical portions of the procedure and immediately available throughout the entire procedure.   I personally reviewed the image(s) / study and resident interpretation. I agree with the findings as stated.   Performed and dictated at [facility].   MACRO: None.   Signed by: Baljit Jackson 7/29/2025 2:20 PM Dictation workstation:   [id number]    ECG 12 lead  Result Date: 7/28/2025  Normal sinus rhythm Right ventricular hypertrophy ST & T wave abnormality, consider anterolateral ischemia Abnormal ECG When compared with ECG of 17-JUN-2025 09:55, T wave inversion more evident in Lateral leads See ED provider note for full interpretation and clinical correlation Confirmed by Fatou Cano ([id number]) on 7/28/2025 10:09:48 PM    CT head wo IV contrast  Result Date: 7/28/2025   No evidence acute intracranial hemorrhage or of large territorial infarct.   I personally reviewed the images/study and I agree with the findings as stated by Carline Vickers MD, PGY-2 this study was interpreted at [facility], [place], [place].   MACRO: None.   Signed by: Eric Sears 7/28/2025 5:39 PM Dictation workstation:   [id number]    XR chest 2 views  Result Date: 7/28/2025  Findings compatible with a  degree of pulmonary edema. A component of the fullness to the nir could be related to dilated pulmonary arterial tree secondary to pulmonary arterial hypertension.   MACRO: None   Signed by: Sai Joshi 7/28/2025 4:47 PM Dictation workstation:   [id number]    This patient has a central line   Reason for the central line remaining today? RBCex    Assessment & Plan  Iron deficiency anemia    Senait Narvaez is a 55 y.o. 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 She was placed on 3L NC in ED. She reported sickle cell pain and HA--> CT head (7/28) negative. CXR (7/28) pulm edema, fullness to the hilia could be r/t dilated pulm arterial tree 2/2 pulm HTN. She was admitted for further management of hypoxia and pain. Hgb and lysis labs initially stable on admit, elevate on 7/30. Started on IV dilaudid for pain management. Pt went into symptomatic (hypotensive) SVT over night 7/28-7/29, improved with vagal maneuvers. She was due for outpt RBCEx 7/31, completed inpatient on 7/29. DC pending improvement in pain, hypoxia and INR.      Updates 8/1:   - S/p RBCex on 7/29, line still in place, plan to remove prior to discharge   - For pain management will cont Oxycodone 10mg q4 hrs PRN with IV Dilaudid 1mg q6 hrs PRN for breakthrough pain   - Coumadin was held yesterday for INR of 5.3, INR today is 4.8, plan to hold another day and check INR tomorrow. If INR is 3 or less will plan DC  home to continuing monitoring with home device and calling Coumadin clinic for dosing    - Plan walking pulse ox, unable to wean off oxygen, chronic hypoxia  but only uses nocturnal oxygen       # Hb SC disease with acute on chronic pain  - Presented to ED with uncontrolled typical sickle cell pain and desaturation and was placed on 3L of O2  - Currently on q6 week RBC exchange transfusions. Last transfusion was on 6/19. Next exchange transfusions as scheduled  on 7/31/2025--> s/p RBCex on 7/29  - Carepath reviewed, last updated 7/28/25: IV Dilaudid 1-1.5 mg every 2-3 hours prn for severe pain  and 0.75-1 mg every 2-3 hrs for moderate pain.  - OARRS reviewed, no aberrant behavior noted    - Hgb BL ~8, Hgb 9.5 on admission (7/28)--> 9.1 (7/30)- no indication for simple transfusion   - Tbili BL ~ 1.0,Tbili 4.4 on admission (7/28)--> 3.2 (7/30)   - LDH ~150-200,  on admission (7/28)--> 193 (7/30)   - CXR (7/28): pulm edema, fullness to the hilia could be r/t dilated pulm arterial tree 2/2 pulm HTN  - CT head (7/28): negative for acute process  - For pain management will rotate IV Dilaudid 1mg q3hrs PRN with Oxycodone 10mg q3hrs PRN   - Continue Folic Acid 1mg daily and Cyclobenzaprine 10mg TID PRN muscle spasms  - Bowel regimen for opioid induced constipation with DocuSenna 2tabs BID and Miralax daily  - PO Benadryl 25 mg q6h PRN for opioid-induced pruritus, PO Zofran 8 mg q8h PRN for opioid-induced nausea   - Utox (7/28): appropriately positive  - Hgb S (7/28): 27.1%, Post-exchange Hgb S -12%, hgb C 11.7%  - CRP 4.87/ESR 20 (7/29)  - IS encouraged       # CKD II  - sCr BL fluctuates but ~1; sCr 1.73 (7/29)--> 2.10 (7/30) --> 2.74 (7/31) ---> 2.15 (8/1)  - UA - 2+ bacteria (7/29), culture contaminated   - Urine electrolytes on 7/29 indication pre-renal etiology   - per attending plan for 250mL bolus on 7/30 as pt with history of fluid overload and pulmonary edema noted on CXR 7/28     # Acute SVT with hx SVT  - Over night 7/28-7/29 pt with episode of SVT HR 180s, improved spontaneously with vagal maneuvers  - EKG (7/28): showing SVT  - Pt with hx of SVT and follows  with cardiology  - Continue home Metop Tartrate 25mg BID  - She was discharged from last hospital admission with holter monitor for palpitations and SVT, which has been controlled with vagal maneuvers  - Continue telemetry monitoring  - Cardiology FUV 8/25     # Known L Breast Mass  - Saw Gracy Barr PA-C outpt on 1/17/25  - Imaging showing likely benign L breast mass with plan for repeat b/l mammogram around 7/29/25  - Planned for repeat imaging outpatient. FUV with surg onc FUV 8/1, patient will need to reschedule      # HFpEF   # pHTN   # Hx of troponinemia   - Last TTE 5/9/25: HFpEF with EF of 67% right ventricular overloaded, severely enlarged RV    - Troponin leak likely iso demand   - Continue Lasix 20mg every other day  - CXR (7/28): Pulm edema, fullness to the hilia could be r/t dilated pulm arterial tree 2/2 pulm HTN  - s/p 20 mg IV lasix in ED  - Will give IV Lasix PRN in addition to home PO dosing every other day     # Hx of DVT/PE   # Hx of SVC syndrom  - Continue home Warfarin 5mg daily  - INR 2.9 (7/29) --> 3.5 (7/30)  - INR 5.3 (7/31), Coumadin was held  - INR 4.8, Coumadin is on hold,   - Check INR 8/2, If INR is 3 or less will plan DC  home to continuing monitoring with home device and calling Coumadin clinic for dosing       # CAN  # Supplemental O2 use  - Continue home 2L NC and CPAP at night  - Currently weaned to 1.5L NC as of 8/1, previously 3L NC, plan to titrate off  - Walking pulse ox pending, may need continuous oxygen for home going      # DVT Prophy:   - Therapeutic Warfarin, SCDs, encourage safe ambulation     # DISPO:  - Full code  - DC pending improvement in pain, walking pulse ox, and INR likely 8/2 or 8/3  - Surg Onc FUV 8/1 (patient will need to reschedule), Ophthalmology FUV 8/6, Cardiology FUV 8/25, SS FUV requested and pending as of 7/30         I spent 60 minutes in the professional and overall care of this patient.    Assessment and plan as above discussed with attending  physician, Dr. Tayo Gonzales PA-C

## 2025-08-02 ENCOUNTER — APPOINTMENT (OUTPATIENT)
Dept: RADIOLOGY | Facility: HOSPITAL | Age: 55
DRG: 811 | End: 2025-08-02
Payer: COMMERCIAL

## 2025-08-02 LAB
ALBUMIN SERPL BCP-MCNC: 3.1 G/DL (ref 3.4–5)
ALP SERPL-CCNC: 115 U/L (ref 33–110)
ALT SERPL W P-5'-P-CCNC: 6 U/L (ref 7–45)
ANION GAP SERPL CALC-SCNC: 9 MMOL/L (ref 10–20)
AST SERPL W P-5'-P-CCNC: 10 U/L (ref 9–39)
BASOPHILS # BLD MANUAL: 0.06 X10*3/UL (ref 0–0.1)
BASOPHILS NFR BLD MANUAL: 0.8 %
BILIRUB SERPL-MCNC: 2.2 MG/DL (ref 0–1.2)
BLASTS # BLD MANUAL: 0 X10*3/UL
BLASTS NFR BLD MANUAL: 0 %
BUN SERPL-MCNC: 25 MG/DL (ref 6–23)
CALCIUM SERPL-MCNC: 8.4 MG/DL (ref 8.6–10.6)
CHLORIDE SERPL-SCNC: 100 MMOL/L (ref 98–107)
CO2 SERPL-SCNC: 33 MMOL/L (ref 21–32)
CREAT SERPL-MCNC: 1.6 MG/DL (ref 0.5–1.05)
EGFRCR SERPLBLD CKD-EPI 2021: 38 ML/MIN/1.73M*2
EOSINOPHIL # BLD MANUAL: 0.33 X10*3/UL (ref 0–0.7)
EOSINOPHIL NFR BLD MANUAL: 4.3 %
ERYTHROCYTE [DISTWIDTH] IN BLOOD BY AUTOMATED COUNT: 25.5 % (ref 11.5–14.5)
GLUCOSE SERPL-MCNC: 89 MG/DL (ref 74–99)
HCT VFR BLD AUTO: 26 % (ref 36–46)
HGB BLD-MCNC: 8 G/DL (ref 12–16)
HGB RETIC QN: 21 PG (ref 28–38)
HYPOCHROMIA BLD QL SMEAR: NORMAL
IMM GRANULOCYTES # BLD AUTO: 0.03 X10*3/UL (ref 0–0.7)
IMM GRANULOCYTES NFR BLD AUTO: 0.4 % (ref 0–0.9)
IMMATURE RETIC FRACTION: 15.7 %
INR PPP: 3.6 (ref 0.9–1.1)
LDH SERPL L TO P-CCNC: 180 U/L (ref 84–246)
LYMPHOCYTES # BLD MANUAL: 1.36 X10*3/UL (ref 1.2–4.8)
LYMPHOCYTES NFR BLD MANUAL: 17.9 %
MCH RBC QN AUTO: 26.4 PG (ref 26–34)
MCHC RBC AUTO-ENTMCNC: 30.8 G/DL (ref 32–36)
MCV RBC AUTO: 86 FL (ref 80–100)
METAMYELOCYTES # BLD MANUAL: 0 X10*3/UL
METAMYELOCYTES NFR BLD MANUAL: 0 %
MONOCYTES # BLD MANUAL: 0.33 X10*3/UL (ref 0.1–1)
MONOCYTES NFR BLD MANUAL: 4.3 %
MYELOCYTES # BLD MANUAL: 0.07 X10*3/UL
MYELOCYTES NFR BLD MANUAL: 0.9 %
NEUTROPHILS # BLD MANUAL: 5.46 X10*3/UL (ref 1.2–7.7)
NEUTS BAND # BLD MANUAL: 0.13 X10*3/UL (ref 0–0.7)
NEUTS BAND NFR BLD MANUAL: 1.7 %
NEUTS SEG # BLD MANUAL: 5.33 X10*3/UL (ref 1.2–7)
NEUTS SEG NFR BLD MANUAL: 70.1 %
NRBC BLD MANUAL-RTO: 0 % (ref 0–0)
NRBC BLD-RTO: 59.8 /100 WBCS (ref 0–0)
OVALOCYTES BLD QL SMEAR: NORMAL
PLASMA CELLS # BLD MANUAL: 0 X10*3/UL
PLASMA CELLS NFR BLD MANUAL: 0 %
PLATELET # BLD AUTO: 226 X10*3/UL (ref 150–450)
POLYCHROMASIA BLD QL SMEAR: NORMAL
POTASSIUM SERPL-SCNC: 4.5 MMOL/L (ref 3.5–5.3)
PROMYELOCYTES # BLD MANUAL: 0 X10*3/UL
PROMYELOCYTES NFR BLD MANUAL: 0 %
PROT SERPL-MCNC: 6.3 G/DL (ref 6.4–8.2)
PROTHROMBIN TIME: 40.1 SECONDS (ref 9.8–12.4)
RBC # BLD AUTO: 3.03 X10*6/UL (ref 4–5.2)
RBC MORPH BLD: NORMAL
RETICS #: 0.41 X10*6/UL (ref 0.02–0.08)
RETICS/RBC NFR AUTO: 13.4 % (ref 0.5–2)
SCHISTOCYTES BLD QL SMEAR: NORMAL
SODIUM SERPL-SCNC: 137 MMOL/L (ref 136–145)
TARGETS BLD QL SMEAR: NORMAL
TOTAL CELLS COUNTED BLD: 117
VARIANT LYMPHS # BLD MANUAL: 0 X10*3/UL (ref 0–0.5)
VARIANT LYMPHS NFR BLD: 0 %
WBC # BLD AUTO: 7.6 X10*3/UL (ref 4.4–11.3)
WBC OTHER # BLD MANUAL: 0 X10*3/UL
WBC OTHER NFR BLD MANUAL: 0 %

## 2025-08-02 PROCEDURE — 2500000004 HC RX 250 GENERAL PHARMACY W/ HCPCS (ALT 636 FOR OP/ED): Mod: JZ,TB

## 2025-08-02 PROCEDURE — 85027 COMPLETE CBC AUTOMATED: CPT | Performed by: PHYSICIAN ASSISTANT

## 2025-08-02 PROCEDURE — 2500000001 HC RX 250 WO HCPCS SELF ADMINISTERED DRUGS (ALT 637 FOR MEDICARE OP): Performed by: PHYSICIAN ASSISTANT

## 2025-08-02 PROCEDURE — 85610 PROTHROMBIN TIME: CPT | Performed by: PHYSICIAN ASSISTANT

## 2025-08-02 PROCEDURE — 2500000004 HC RX 250 GENERAL PHARMACY W/ HCPCS (ALT 636 FOR OP/ED)

## 2025-08-02 PROCEDURE — 99233 SBSQ HOSP IP/OBS HIGH 50: CPT

## 2025-08-02 PROCEDURE — 84075 ASSAY ALKALINE PHOSPHATASE: CPT | Performed by: PHYSICIAN ASSISTANT

## 2025-08-02 PROCEDURE — 93970 EXTREMITY STUDY: CPT

## 2025-08-02 PROCEDURE — 85007 BL SMEAR W/DIFF WBC COUNT: CPT | Performed by: PHYSICIAN ASSISTANT

## 2025-08-02 PROCEDURE — 1170000001 HC PRIVATE ONCOLOGY ROOM DAILY

## 2025-08-02 PROCEDURE — 93971 EXTREMITY STUDY: CPT | Performed by: RADIOLOGY

## 2025-08-02 PROCEDURE — 2500000004 HC RX 250 GENERAL PHARMACY W/ HCPCS (ALT 636 FOR OP/ED): Mod: JZ,TB | Performed by: PHYSICIAN ASSISTANT

## 2025-08-02 PROCEDURE — 2500000001 HC RX 250 WO HCPCS SELF ADMINISTERED DRUGS (ALT 637 FOR MEDICARE OP)

## 2025-08-02 PROCEDURE — 85045 AUTOMATED RETICULOCYTE COUNT: CPT | Performed by: PHYSICIAN ASSISTANT

## 2025-08-02 PROCEDURE — 36415 COLL VENOUS BLD VENIPUNCTURE: CPT | Performed by: PHYSICIAN ASSISTANT

## 2025-08-02 PROCEDURE — 83615 LACTATE (LD) (LDH) ENZYME: CPT | Performed by: PHYSICIAN ASSISTANT

## 2025-08-02 RX ADMIN — HYDROMORPHONE HYDROCHLORIDE 1 MG: 1 INJECTION, SOLUTION INTRAMUSCULAR; INTRAVENOUS; SUBCUTANEOUS at 12:22

## 2025-08-02 RX ADMIN — PANTOPRAZOLE SODIUM 40 MG: 40 TABLET, DELAYED RELEASE ORAL at 06:22

## 2025-08-02 RX ADMIN — HYDROMORPHONE HYDROCHLORIDE 0.5 MG: 1 INJECTION, SOLUTION INTRAMUSCULAR; INTRAVENOUS; SUBCUTANEOUS at 22:21

## 2025-08-02 RX ADMIN — FUROSEMIDE 20 MG: 20 TABLET ORAL at 08:44

## 2025-08-02 RX ADMIN — FOLIC ACID 1 MG: 1 TABLET ORAL at 08:44

## 2025-08-02 RX ADMIN — OXYCODONE HYDROCHLORIDE 10 MG: 10 TABLET ORAL at 20:49

## 2025-08-02 RX ADMIN — ONDANSETRON HYDROCHLORIDE 8 MG: 8 TABLET, FILM COATED ORAL at 08:48

## 2025-08-02 RX ADMIN — DIPHENHYDRAMINE HYDROCHLORIDE 25 MG: 25 CAPSULE ORAL at 06:26

## 2025-08-02 RX ADMIN — METOPROLOL TARTRATE 25 MG: 25 TABLET, FILM COATED ORAL at 08:44

## 2025-08-02 RX ADMIN — HYDROMORPHONE HYDROCHLORIDE 1 MG: 1 INJECTION, SOLUTION INTRAMUSCULAR; INTRAVENOUS; SUBCUTANEOUS at 06:22

## 2025-08-02 RX ADMIN — OXYCODONE HYDROCHLORIDE 10 MG: 10 TABLET ORAL at 03:20

## 2025-08-02 RX ADMIN — OXYCODONE HYDROCHLORIDE 10 MG: 10 TABLET ORAL at 10:01

## 2025-08-02 RX ADMIN — CETIRIZINE HYDROCHLORIDE 10 MG: 10 TABLET ORAL at 08:44

## 2025-08-02 RX ADMIN — HYDROMORPHONE HYDROCHLORIDE 1 MG: 1 INJECTION, SOLUTION INTRAMUSCULAR; INTRAVENOUS; SUBCUTANEOUS at 18:27

## 2025-08-02 RX ADMIN — HYDROMORPHONE HYDROCHLORIDE 1 MG: 1 INJECTION, SOLUTION INTRAMUSCULAR; INTRAVENOUS; SUBCUTANEOUS at 00:16

## 2025-08-02 RX ADMIN — OXYCODONE HYDROCHLORIDE 10 MG: 10 TABLET ORAL at 14:33

## 2025-08-02 RX ADMIN — METOPROLOL TARTRATE 25 MG: 25 TABLET, FILM COATED ORAL at 20:45

## 2025-08-02 ASSESSMENT — COGNITIVE AND FUNCTIONAL STATUS - GENERAL
MOBILITY SCORE: 24
DAILY ACTIVITIY SCORE: 24
DAILY ACTIVITIY SCORE: 24
MOBILITY SCORE: 24

## 2025-08-02 ASSESSMENT — PAIN SCALES - GENERAL
PAINLEVEL_OUTOF10: 6
PAINLEVEL_OUTOF10: 8
PAINLEVEL_OUTOF10: 7
PAINLEVEL_OUTOF10: 8
PAINLEVEL_OUTOF10: 7
PAINLEVEL_OUTOF10: 7
PAINLEVEL_OUTOF10: 8
PAINLEVEL_OUTOF10: 7
PAINLEVEL_OUTOF10: 8
PAINLEVEL_OUTOF10: 7
PAINLEVEL_OUTOF10: 7

## 2025-08-02 ASSESSMENT — PAIN - FUNCTIONAL ASSESSMENT

## 2025-08-02 NOTE — CARE PLAN
The clinical goals for the shift include patient will rate pain a 7 or less throughout shift 8/2 at 1900      Problem: Pain - Adult  Goal: Verbalizes/displays adequate comfort level or baseline comfort level  Outcome: Progressing     Problem: Safety - Adult  Goal: Free from fall injury  Outcome: Progressing     Problem: Nutrition  Goal: Nutrient intake appropriate for maintaining nutritional needs  Outcome: Progressing     Problem: Pain  Goal: Takes deep breaths with improved pain control throughout the shift  Outcome: Progressing     Problem: Pain  Goal: Turns in bed with improved pain control throughout the shift  Outcome: Progressing

## 2025-08-02 NOTE — NURSING NOTE
Walking pulse ox:   O2 on RA sittin%   O2 while standing on RA: 92%  O2 on exertion on RA: 90-92%   Sitting and recovering on RA: 93%     Hilton PIZANO

## 2025-08-02 NOTE — PROGRESS NOTES
"Senait Narvaez is a 55 y.o. female on day 5 of admission presenting with Sickle cell anemia with pain.    Subjective   No acute events overnight.  This morning patient states she continues to have 9 out of 10 pain in her back, arms and legs.  Denied headache or chest pain.  She reports she thinks her medication is too spaced out which is why she continues to have pain.  Reports having intermittent nausea that resolved with medication.  Continues to be on minimal oxygen 0.5 L nasal cannula.  She reports she was able to get up with nursing yesterday and walk in the jones without feeling short of breath.  Reports last BM last night.    Objective     Physical Exam  Constitutional:       Appearance: Normal appearance. She is obese.   HENT:      Head: Normocephalic.     Cardiovascular:      Rate and Rhythm: Normal rate and regular rhythm.      Heart sounds: Normal heart sounds.   Pulmonary:      Effort: Pulmonary effort is normal.      Breath sounds: Normal breath sounds.      Comments: 0.5L NC in place  Abdominal:      General: Abdomen is flat. There is no distension.      Palpations: Abdomen is soft.      Tenderness: There is no abdominal tenderness. There is no guarding.     Musculoskeletal:      Right lower leg: Edema present.      Left lower leg: No edema.      Comments: Pain on palpation of bilateral lower extremity  L>R     Skin:     General: Skin is warm.      Comments: Femoral apharesis line in place on right leg, clean and dry.      Neurological:      Mental Status: She is alert.       Last Recorded Vitals  Blood pressure 110/64, pulse 74, temperature 36.6 °C (97.9 °F), resp. rate 16, height 1.651 m (5' 5\"), weight 98.9 kg (218 lb), SpO2 96%.  Intake/Output last 3 Shifts:  No intake/output data recorded.    Relevant Results  ECG 12 lead  Result Date: 7/29/2025  Normal sinus rhythm Possible Left atrial enlargement Right axis deviation T wave abnormality, consider lateral ischemia Abnormal ECG When compared with ECG " of 28-JUL-2025 23:11, (unconfirmed) No significant change was found Confirmed by Jonathan Rajput ([id number]) on 7/29/2025 4:21:36 PM    IR CVC nontunneled  Result Date: 7/29/2025    1. Technically successful and uncomplicated placement of a right common femoral temporary dialysis catheter under direct ultrasound and fluoroscopic guidance - optimal catheter tip position at the right atrial superior vena caval junction and the catheter is ready for immediate use.   I was present for and/or performed the critical portions of the procedure and immediately available throughout the entire procedure.   I personally reviewed the image(s) / study and resident interpretation. I agree with the findings as stated.   Performed and dictated at [facility].   MACRO: None.   Signed by: Baljit Jackson 7/29/2025 2:20 PM Dictation workstation:   [id number]    ECG 12 lead  Result Date: 7/28/2025  Normal sinus rhythm Right ventricular hypertrophy ST & T wave abnormality, consider anterolateral ischemia Abnormal ECG When compared with ECG of 17-JUN-2025 09:55, T wave inversion more evident in Lateral leads See ED provider note for full interpretation and clinical correlation Confirmed by Fatou Cano ([id number]) on 7/28/2025 10:09:48 PM    CT head wo IV contrast  Result Date: 7/28/2025   No evidence acute intracranial hemorrhage or of large territorial infarct.   I personally reviewed the images/study and I agree with the findings as stated by Carline Vickers MD, PGY-2 this study was interpreted at [facility], [place], [place].   MACRO: None.   Signed by: Eric Sears 7/28/2025 5:39 PM Dictation workstation:   [id number]    XR chest 2 views  Result Date: 7/28/2025  Findings compatible with a degree of pulmonary edema. A component of the fullness to the nir could be related to dilated pulmonary arterial tree secondary to pulmonary arterial hypertension.   MACRO: None   Signed  by: Sai Joshi 7/28/2025 4:47 PM Dictation workstation:   [id number]    This patient has a central line   Reason for the central line remaining today? RBCex    Assessment & Plan  Iron deficiency anemia    Senait Narvaez is a 55 y.o. 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 She was placed on 3L NC in ED. She reported sickle cell pain and HA--> CT head (7/28) negative. CXR (7/28) pulm edema, fullness to the hilia could be r/t dilated pulm arterial tree 2/2 pulm HTN. She was admitted for further management of hypoxia and pain. Hgb and lysis labs initially stable on admit, elevate on 7/30. Started on IV dilaudid for pain management. Pt went into symptomatic (hypotensive) SVT over night 7/28-7/29, improved with vagal maneuvers. She was due for outpt RBCEx 7/31, completed inpatient on 7/29. DC pending improvement in pain, hypoxia and INR.      Updates 8/2:   - S/p RBCex on 7/29, line still in place, plan to remove prior to discharge   - Per chart review, patient received 40mg Oxy total and 4mg dilaudid 8/1. Patient able to receive ox 10mg q4.   - For pain management will cont Oxycodone 10mg q4 hrs PRN with IV Dilaudid 1mg q6 hrs PRN for breakthrough pain   - Visible swelling or RLE > LLE , plan for DVT ultrasound.  - Coumadin was held yesterday for INR of 4.8, INR today is 3.6 plan to hold another day and check INR tomorrow. If INR is 3 or less will plan DC  home to continuing monitoring with home device and calling Coumadin clinic for dosing    - Plan walking pulse ox today, chronic hypoxia but only uses nocturnal oxygen       #  Hb SC disease with acute on chronic pain  - Presented to ED with uncontrolled typical sickle cell pain and desaturation and was placed on 3L of O2  - Currently on q6 week RBC exchange transfusions. Last transfusion was on 6/19. Next exchange transfusions as scheduled  on 7/31/2025--> s/p RBCex on 7/29  - Carepath reviewed, last updated 7/28/25: IV Dilaudid 1-1.5 mg every 2-3 hours prn for severe pain  and 0.75-1 mg every 2-3 hrs for moderate pain.  - OARRS reviewed, no aberrant behavior noted    - Hgb BL ~8, Hgb 9.5 on admission (7/28)--> 9.1 (7/30)- no indication for simple transfusion   - Tbili BL ~ 1.0,Tbili 4.4 on admission (7/28)--> 3.2 (7/30)   - LDH ~150-200,  on admission (7/28)--> 193 (7/30)   - CXR (7/28): pulm edema, fullness to the hilia could be r/t dilated pulm arterial tree 2/2 pulm HTN  - CT head (7/28): negative for acute process  - Continue Folic Acid 1mg daily and Cyclobenzaprine 10mg TID PRN muscle spasms  - Bowel regimen for opioid induced constipation with DocuSenna 2tabs BID and Miralax daily  - PO Benadryl 25 mg q6h PRN for opioid-induced pruritus, PO Zofran 8 mg q8h PRN for opioid-induced nausea   - Utox (7/28): appropriately positive  - Hgb S (7/28): 27.1%, Post-exchange Hgb S -12%, hgb C 11.7%  - CRP 4.87/ESR 20 (7/29)  - IS encouraged       # CKD II  - sCr BL fluctuates but ~1; sCr 1.73 (7/29)--> 2.10 (7/30) --> 2.74 (7/31) ---> 2.15 (8/1) --> 8/2 1.60  - UA - 2+ bacteria (7/29), culture contaminated   - Urine electrolytes on 7/29 indication pre-renal etiology   - per attending plan for 250mL bolus on 7/30 as pt with history of fluid overload and pulmonary edema noted on CXR 7/28  - Plan: CTM as downtrending      # Acute SVT with hx SVT  - Over night 7/28-7/29 pt with episode of SVT HR 180s, improved spontaneously with vagal maneuvers  - EKG (7/28): showing SVT  - Pt with hx of SVT and follows with cardiology  - Continue home Metop Tartrate 25mg BID  - She was discharged from last  hospital admission with holter monitor for palpitations and SVT, which has been controlled with vagal maneuvers  - Continue telemetry monitoring  - Cardiology FUV 8/25     # Known L Breast Mass  - Saw Gracy Barr PA-C outpt on 1/17/25  - Imaging showing likely benign L breast mass with plan for repeat b/l mammogram around 7/29/25  - Planned for repeat imaging outpatient. FUV with surg onc FUV 8/1, patient will need to reschedule      # HFpEF   # pHTN   # Hx of troponinemia   - Last TTE 5/9/25: HFpEF with EF of 67% right ventricular overloaded, severely enlarged RV    - Troponin leak likely iso demand   - Continue Lasix 20mg every other day  - CXR (7/28): Pulm edema, fullness to the hilia could be r/t dilated pulm arterial tree 2/2 pulm HTN  - Will give IV Lasix PRN in addition to home PO dosing every other day     # Hx of DVT/PE   # Hx of SVC syndrome  - Continue home Warfarin 5mg daily  - INR 2.9 (7/29) --> 3.5 (7/30)  - INR 5.3 (7/31), Coumadin was held  - INR 4.8, Coumadin is on hold,   - INR 3.6 8/2, Coumadin on hold  - Check INR 8/3, If INR is 3 or less will plan DC  home to continuing monitoring with home device and calling Coumadin clinic for dosing       # CAN  # Supplemental O2 use  - Continue home 2L NC and CPAP at night  - Currently weaned to 0.5L NC as of 8/2, previously 3L NC, plan to titrate off  - Walking pulse ox pending, may need continuous oxygen for home going      # DVT Prophy:   - Therapeutic Warfarin, SCDs, encourage safe ambulation     # DISPO:  - Full code  - DC pending improvement in pain, walking pulse ox, and INR likely 8/3 or 8/4  - Surg Onc FUV 8/1 (patient will need to reschedule), Ophthalmology FUV 8/6, Cardiology FUV 8/25, SS FUV requested and pending as of 7/30       Assessment and plan not finalized until discussed with Attending Physician.    Saravanan Mcnulty MD  Internal Medicine Resident, PGY3

## 2025-08-03 VITALS
RESPIRATION RATE: 18 BRPM | HEIGHT: 65 IN | OXYGEN SATURATION: 97 % | SYSTOLIC BLOOD PRESSURE: 112 MMHG | WEIGHT: 218 LBS | BODY MASS INDEX: 36.32 KG/M2 | TEMPERATURE: 96.8 F | HEART RATE: 72 BPM | DIASTOLIC BLOOD PRESSURE: 56 MMHG

## 2025-08-03 LAB
ALBUMIN SERPL BCP-MCNC: 3.4 G/DL (ref 3.4–5)
ALP SERPL-CCNC: 115 U/L (ref 33–110)
ALT SERPL W P-5'-P-CCNC: 5 U/L (ref 7–45)
ANION GAP SERPL CALC-SCNC: 10 MMOL/L (ref 10–20)
AST SERPL W P-5'-P-CCNC: 11 U/L (ref 9–39)
BASOPHILS # BLD AUTO: 0.02 X10*3/UL (ref 0–0.1)
BASOPHILS NFR BLD AUTO: 0.3 %
BILIRUB SERPL-MCNC: 2.8 MG/DL (ref 0–1.2)
BUN SERPL-MCNC: 17 MG/DL (ref 6–23)
CALCIUM SERPL-MCNC: 8.7 MG/DL (ref 8.6–10.6)
CHLORIDE SERPL-SCNC: 97 MMOL/L (ref 98–107)
CO2 SERPL-SCNC: 34 MMOL/L (ref 21–32)
CREAT SERPL-MCNC: 1.22 MG/DL (ref 0.5–1.05)
EGFRCR SERPLBLD CKD-EPI 2021: 52 ML/MIN/1.73M*2
EOSINOPHIL # BLD AUTO: 0.34 X10*3/UL (ref 0–0.7)
EOSINOPHIL NFR BLD AUTO: 4.3 %
ERYTHROCYTE [DISTWIDTH] IN BLOOD BY AUTOMATED COUNT: 24.1 % (ref 11.5–14.5)
GLUCOSE SERPL-MCNC: 122 MG/DL (ref 74–99)
HCT VFR BLD AUTO: 26.4 % (ref 36–46)
HGB BLD-MCNC: 8.5 G/DL (ref 12–16)
HGB RETIC QN: 20 PG (ref 28–38)
IMM GRANULOCYTES # BLD AUTO: 0.02 X10*3/UL (ref 0–0.7)
IMM GRANULOCYTES NFR BLD AUTO: 0.3 % (ref 0–0.9)
IMMATURE RETIC FRACTION: 12.7 %
INR PPP: 2.6 (ref 0.9–1.1)
LDH SERPL L TO P-CCNC: 197 U/L (ref 84–246)
LYMPHOCYTES # BLD AUTO: 1.11 X10*3/UL (ref 1.2–4.8)
LYMPHOCYTES NFR BLD AUTO: 14 %
MCH RBC QN AUTO: 26.2 PG (ref 26–34)
MCHC RBC AUTO-ENTMCNC: 32.2 G/DL (ref 32–36)
MCV RBC AUTO: 82 FL (ref 80–100)
MONOCYTES # BLD AUTO: 0.7 X10*3/UL (ref 0.1–1)
MONOCYTES NFR BLD AUTO: 8.8 %
NEUTROPHILS # BLD AUTO: 5.76 X10*3/UL (ref 1.2–7.7)
NEUTROPHILS NFR BLD AUTO: 72.3 %
NRBC BLD-RTO: 36.6 /100 WBCS (ref 0–0)
PLATELET # BLD AUTO: 259 X10*3/UL (ref 150–450)
POTASSIUM SERPL-SCNC: 4.4 MMOL/L (ref 3.5–5.3)
PROT SERPL-MCNC: 6.5 G/DL (ref 6.4–8.2)
PROTHROMBIN TIME: 29.3 SECONDS (ref 9.8–12.4)
RBC # BLD AUTO: 3.24 X10*6/UL (ref 4–5.2)
RETICS #: 0.41 X10*6/UL (ref 0.02–0.08)
RETICS/RBC NFR AUTO: 12.6 % (ref 0.5–2)
SODIUM SERPL-SCNC: 137 MMOL/L (ref 136–145)
WBC # BLD AUTO: 8 X10*3/UL (ref 4.4–11.3)

## 2025-08-03 PROCEDURE — 99233 SBSQ HOSP IP/OBS HIGH 50: CPT

## 2025-08-03 PROCEDURE — 2500000001 HC RX 250 WO HCPCS SELF ADMINISTERED DRUGS (ALT 637 FOR MEDICARE OP)

## 2025-08-03 PROCEDURE — 2500000001 HC RX 250 WO HCPCS SELF ADMINISTERED DRUGS (ALT 637 FOR MEDICARE OP): Performed by: PHYSICIAN ASSISTANT

## 2025-08-03 PROCEDURE — 83615 LACTATE (LD) (LDH) ENZYME: CPT | Performed by: PHYSICIAN ASSISTANT

## 2025-08-03 PROCEDURE — 85025 COMPLETE CBC W/AUTO DIFF WBC: CPT | Performed by: PHYSICIAN ASSISTANT

## 2025-08-03 PROCEDURE — 85045 AUTOMATED RETICULOCYTE COUNT: CPT | Performed by: PHYSICIAN ASSISTANT

## 2025-08-03 PROCEDURE — 1170000001 HC PRIVATE ONCOLOGY ROOM DAILY

## 2025-08-03 PROCEDURE — 36415 COLL VENOUS BLD VENIPUNCTURE: CPT | Performed by: PHYSICIAN ASSISTANT

## 2025-08-03 PROCEDURE — 2500000004 HC RX 250 GENERAL PHARMACY W/ HCPCS (ALT 636 FOR OP/ED): Mod: JZ,TB | Performed by: PHYSICIAN ASSISTANT

## 2025-08-03 PROCEDURE — 85610 PROTHROMBIN TIME: CPT | Performed by: PHYSICIAN ASSISTANT

## 2025-08-03 PROCEDURE — 84075 ASSAY ALKALINE PHOSPHATASE: CPT | Performed by: PHYSICIAN ASSISTANT

## 2025-08-03 RX ORDER — OXYCODONE HYDROCHLORIDE 10 MG/1
10 TABLET ORAL
Refills: 0 | Status: DISCONTINUED | OUTPATIENT
Start: 2025-08-03 | End: 2025-08-04

## 2025-08-03 RX ADMIN — METOPROLOL TARTRATE 25 MG: 25 TABLET, FILM COATED ORAL at 20:43

## 2025-08-03 RX ADMIN — OXYCODONE HYDROCHLORIDE 10 MG: 10 TABLET ORAL at 10:24

## 2025-08-03 RX ADMIN — PANTOPRAZOLE SODIUM 40 MG: 40 TABLET, DELAYED RELEASE ORAL at 06:10

## 2025-08-03 RX ADMIN — OXYCODONE HYDROCHLORIDE 10 MG: 10 TABLET ORAL at 00:49

## 2025-08-03 RX ADMIN — DIPHENHYDRAMINE HYDROCHLORIDE 25 MG: 25 CAPSULE ORAL at 00:50

## 2025-08-03 RX ADMIN — OXYCODONE HYDROCHLORIDE 10 MG: 10 TABLET ORAL at 06:10

## 2025-08-03 RX ADMIN — HYDROMORPHONE HYDROCHLORIDE 1 MG: 1 INJECTION, SOLUTION INTRAMUSCULAR; INTRAVENOUS; SUBCUTANEOUS at 08:33

## 2025-08-03 RX ADMIN — OXYCODONE HYDROCHLORIDE 10 MG: 10 TABLET ORAL at 17:37

## 2025-08-03 RX ADMIN — HYDROMORPHONE HYDROCHLORIDE 1 MG: 1 INJECTION, SOLUTION INTRAMUSCULAR; INTRAVENOUS; SUBCUTANEOUS at 14:33

## 2025-08-03 RX ADMIN — METOPROLOL TARTRATE 25 MG: 25 TABLET, FILM COATED ORAL at 08:40

## 2025-08-03 RX ADMIN — CETIRIZINE HYDROCHLORIDE 10 MG: 10 TABLET ORAL at 08:34

## 2025-08-03 RX ADMIN — HYDROMORPHONE HYDROCHLORIDE 1 MG: 1 INJECTION, SOLUTION INTRAMUSCULAR; INTRAVENOUS; SUBCUTANEOUS at 02:31

## 2025-08-03 RX ADMIN — Medication: at 08:34

## 2025-08-03 RX ADMIN — Medication: at 20:42

## 2025-08-03 RX ADMIN — FOLIC ACID 1 MG: 1 TABLET ORAL at 08:34

## 2025-08-03 RX ADMIN — HYDROMORPHONE HYDROCHLORIDE 1 MG: 1 INJECTION, SOLUTION INTRAMUSCULAR; INTRAVENOUS; SUBCUTANEOUS at 20:36

## 2025-08-03 ASSESSMENT — COGNITIVE AND FUNCTIONAL STATUS - GENERAL
MOBILITY SCORE: 24
DAILY ACTIVITIY SCORE: 24
DAILY ACTIVITIY SCORE: 24
MOBILITY SCORE: 24

## 2025-08-03 ASSESSMENT — PAIN SCALES - GENERAL
PAINLEVEL_OUTOF10: 8
PAINLEVEL_OUTOF10: 7
PAINLEVEL_OUTOF10: 8
PAINLEVEL_OUTOF10: 7
PAINLEVEL_OUTOF10: 8
PAINLEVEL_OUTOF10: 9
PAINLEVEL_OUTOF10: 8

## 2025-08-03 NOTE — CARE PLAN
The patient's goals for the shift include      The clinical goals for the shift include Pt will have decreased pain through end of shift 8/3/2025 0700      Problem: Pain - Adult  Goal: Verbalizes/displays adequate comfort level or baseline comfort level  Outcome: Progressing     Problem: Safety - Adult  Goal: Free from fall injury  Outcome: Progressing     Problem: Discharge Planning  Goal: Discharge to home or other facility with appropriate resources  Outcome: Progressing     Problem: Chronic Conditions and Co-morbidities  Goal: Patient's chronic conditions and co-morbidity symptoms are monitored and maintained or improved  Outcome: Progressing     Problem: Nutrition  Goal: Nutrient intake appropriate for maintaining nutritional needs  Outcome: Progressing     Problem: Pain  Goal: Takes deep breaths with improved pain control throughout the shift  Outcome: Progressing  Goal: Turns in bed with improved pain control throughout the shift  Outcome: Progressing  Goal: Walks with improved pain control throughout the shift  Outcome: Progressing  Goal: Performs ADL's with improved pain control throughout shift  Outcome: Progressing  Goal: Participates in PT with improved pain control throughout the shift  Outcome: Progressing  Goal: Free from opioid side effects throughout the shift  Outcome: Progressing  Goal: Free from acute confusion related to pain meds throughout the shift  Outcome: Progressing     Problem: Fall/Injury  Goal: Not fall by end of shift  Outcome: Progressing  Goal: Be free from injury by end of the shift  Outcome: Progressing  Goal: Verbalize understanding of personal risk factors for fall in the hospital  Outcome: Progressing  Goal: Verbalize understanding of risk factor reduction measures to prevent injury from fall in the home  Outcome: Progressing  Goal: Use assistive devices by end of the shift  Outcome: Progressing  Goal: Pace activities to prevent fatigue by end of the shift  Outcome:  Progressing

## 2025-08-03 NOTE — PROGRESS NOTES
"Senait Narvaez is a 55 y.o. female on day 6 of admission presenting with Sickle cell anemia with pain.    Subjective   Seen at bedside. ORTIZ. Reports pain has not improved since admission in a significant way. We discussed that oxycodone frequency can be adjusted to q3 today but otherwise continue current regimen. Awaiting AM labs. Eating and drinking well.        Objective     Physical Exam  Constitutional:       Appearance: She is obese.   HENT:      Head: Normocephalic.      Right Ear: External ear normal.      Left Ear: External ear normal.      Nose: Nose normal.     Eyes:      General: Scleral icterus present.      Extraocular Movements: Extraocular movements intact.       Cardiovascular:      Rate and Rhythm: Normal rate and regular rhythm.   Pulmonary:      Effort: Pulmonary effort is normal.   Abdominal:      General: Abdomen is flat.     Musculoskeletal:         General: Normal range of motion.      Cervical back: Normal range of motion.     Skin:     General: Skin is warm and dry.     Neurological:      Mental Status: She is alert and oriented to person, place, and time. Mental status is at baseline.     Psychiatric:         Mood and Affect: Mood normal.         Behavior: Behavior normal.         Thought Content: Thought content normal.         Judgment: Judgment normal.         Last Recorded Vitals  Blood pressure 101/65, pulse 67, temperature 36.1 °C (97 °F), temperature source Temporal, resp. rate 16, height 1.651 m (5' 5\"), weight 98.9 kg (218 lb), SpO2 95%.  Intake/Output last 3 Shifts:  I/O last 3 completed shifts:  In: 500 (5.1 mL/kg) [P.O.:500]  Out: - (0 mL/kg)   Weight: 98.9 kg     Relevant Results    .Scheduled medications  Scheduled Medications[1]  Continuous medications  Continuous Medications[2]  PRN medications  PRN Medications[3]    .  Results for orders placed or performed during the hospital encounter of 07/28/25 (from the past 24 hours)   Protime-INR   Result Value Ref Range    Protime " 29.3 (H) 9.8 - 12.4 seconds    INR 2.6 (H) 0.9 - 1.1   Reticulocytes   Result Value Ref Range    Retic % 12.6 (H) 0.5 - 2.0 %    Retic Absolute 0.407 (H) 0.018 - 0.083 x10*6/uL    Reticulocyte Hemoglobin 20 (L) 28 - 38 pg    Immature Retic fraction 12.7 <=16.0 %   CBC and Auto Differential   Result Value Ref Range    WBC 8.0 4.4 - 11.3 x10*3/uL    nRBC 36.6 (H) 0.0 - 0.0 /100 WBCs    RBC 3.24 (L) 4.00 - 5.20 x10*6/uL    Hemoglobin 8.5 (L) 12.0 - 16.0 g/dL    Hematocrit 26.4 (L) 36.0 - 46.0 %    MCV 82 80 - 100 fL    MCH 26.2 26.0 - 34.0 pg    MCHC 32.2 32.0 - 36.0 g/dL    RDW 24.1 (H) 11.5 - 14.5 %    Platelets 259 150 - 450 x10*3/uL    Neutrophils % 72.3 40.0 - 80.0 %    Immature Granulocytes %, Automated 0.3 0.0 - 0.9 %    Lymphocytes % 14.0 13.0 - 44.0 %    Monocytes % 8.8 2.0 - 10.0 %    Eosinophils % 4.3 0.0 - 6.0 %    Basophils % 0.3 0.0 - 2.0 %    Neutrophils Absolute 5.76 1.20 - 7.70 x10*3/uL    Immature Granulocytes Absolute, Automated 0.02 0.00 - 0.70 x10*3/uL    Lymphocytes Absolute 1.11 (L) 1.20 - 4.80 x10*3/uL    Monocytes Absolute 0.70 0.10 - 1.00 x10*3/uL    Eosinophils Absolute 0.34 0.00 - 0.70 x10*3/uL    Basophils Absolute 0.02 0.00 - 0.10 x10*3/uL     *Note: Due to a large number of results and/or encounters for the requested time period, some results have not been displayed. A complete set of results can be found in Results Review.              Assessment & Plan  Iron deficiency anemia    Senait Narvaez is a 55 y.o. 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 She reported sickle cell pain and HA-->  CT head (7/28) negative. CXR (7/28) pulm edema, fullness to the hilia could be r/t dilated pulm arterial tree 2/2 pulm HTN. Hgb and lysis labs initially stable on admit, elevated on 7/30 now returned to baseline. Started on IV dilaudid and oxy for pain management. Pt went into symptomatic (hypotensive) SVT (known hx, on home metop) over night 7/28-7/29, resolved with vagal maneuvers. She was due for outpt RBCEx 7/31, completed inpatient on 7/29. 8/2 BLE lower US w/o evidence of acute process. S/p walking pulse ox test 8/2 w/o needs for supplemental O2, remained >90% on RA. DC home pending improvement in pain.      # Hb SC disease with acute on chronic pain  - Presented to ED with uncontrolled typical sickle cell pain and desaturation and was placed on 3L of O2  - Currently on q6 week RBC exchange transfusions. Last transfusion was on 6/19. Next exchange transfusions as scheduled  on 7/31/2025--> s/p RBCex on 7/29, line removed 8/2  - [facility] reviewed, last updated 7/28/25: IV Dilaudid 1-1.5 mg every 2-3 hours prn for severe pain  and 0.75-1 mg every 2-3 hrs for moderate pain.  - OARRS reviewed, no aberrant behavior noted    - Hgb BL ~8, Hgb 9.5 on admission (7/28)--> 8.5 (8/2)- no indication for simple transfusion   - Tbili BL ~ 1.0,Tbili 4.4 on admission (7/28)--> 2.2 (8/2)   - LDH ~150-200,  on admission (7/28)--> 180 (8/2)   - CXR (7/28): pulm edema, fullness to the hilia could be r/t dilated pulm arterial tree 2/2 pulm HTN  - CT head (7/28): negative for acute process  - Continue Folic Acid 1mg daily and Cyclobenzaprine 10mg TID PRN muscle spasms  - Bowel regimen for opioid induced constipation with DocuSenna 2tabs BID and Miralax daily  - c/w oxycodone 10mg q3 PRN severe pain and 1mg IVP dilaudid q6 PRN breakthrough   - PO Benadryl 25 mg q6h PRN for opioid-induced pruritus, PO Zofran 8 mg q8h PRN for opioid-induced nausea   - Utox (7/28): appropriately positive  - Hgb S (7/28): 27.1%, Post-exchange Hgb  S -12%, hgb C 11.7%  - CRP 4.87/ESR 20 (7/29)  - IS encouraged    - BLE lower US duplex negative for DVT 8/2     # CKD II  - sCr BL fluctuates but ~1; sCr 1.73 (7/29)--> 2.10 (7/30) --> 2.74 (7/31) ---> 2.15 (8/1) --> 8/2 1.60  - UA - 2+ bacteria (7/29), culture contaminated   - Urine electrolytes on 7/29 indication pre-renal etiology   - per attending plan for 250mL bolus on 7/30 as pt with history of fluid overload and pulmonary edema noted on CXR 7/28  - Plan: CTM as downtrending      # Acute SVT with hx SVT  - Over night 7/28-7/29 pt with episode of SVT HR 180s, improved spontaneously with vagal maneuvers  - EKG (7/28): showing SVT  - Pt with hx of SVT and follows with cardiology  - Continue home Metop Tartrate 25mg BID  - She was discharged from last hospital admission with holter monitor for palpitations and SVT, which has been controlled with vagal maneuvers  - Continue telemetry monitoring  - Cardiology FUV 8/25     # Known L Breast Mass  - Saw Gracy Barr PA-C outpt on 1/17/25  - Imaging showing likely benign L breast mass with plan for repeat b/l mammogram around 7/29/25  - Planned for repeat imaging outpatient. FUV with surg onc FUV 8/1, patient will need to reschedule      # HFpEF   # pHTN   # Hx of troponinemia   - Last TTE 5/9/25: HFpEF with EF of 67% right ventricular overloaded, severely enlarged RV    - Troponin leak likely iso demand   - Continue Lasix 20mg every other day  - CXR (7/28): Pulm edema, fullness to the hilia could be r/t dilated pulm arterial tree 2/2 pulm HTN  - Will give IV Lasix PRN in addition to home PO dosing every other day     # Hx of DVT/PE   # Hx of SVC syndrome  - on home Warfarin 5mg daily  - INR 2.9 (7/29) --> 3.5 (7/30)  - INR 5.3 (7/31), Coumadin was held (7/31- current)  - INR 4.8, Coumadin is on hold,   - INR 3.6 8/2, Coumadin on hold  - INR 2.6 (8/2)   - continuing monitoring with home device and calling Coumadin clinic for dosing       # CAN  # Supplemental O2  use  - Continue home 2L NC and CPAP at night  - Currently weaned to 0.5L NC as of 8/2, previously 3L NC, plan to titrate off  - Walking pulse ox pending, may need continuous oxygen for home going   - 8/3 passed walking pulse ox test      # DVT Prophy   - Therapeutic Warfarin, SCDs, encourage safe ambulation     # DISPO:  - Full code  - DC home resumed nox O2 pending improvement in pain and INR stability  - Surg Onc FUV 8/1 (patient will need to reschedule), Ophthalmology 8/6, Cardiology 8/25, 9/2 RBC exchange, will request sickle cell clinic prior to discharge    Assessment and plan not finalized until discussed with Attending Physician Dr. Darby.    I spent >60 minutes in the professional and overall care of this patient.      GRISEL Vieira-CNP         [1] cetirizine, 10 mg, oral, Daily  folic acid, 1 mg, oral, Daily  furosemide, 20 mg, oral, Every other day  metoprolol tartrate, 25 mg, oral, BID  pantoprazole, 40 mg, oral, Daily before breakfast  polyethylene glycol, 17 g, oral, Daily  sennosides-docusate sodium, 2 tablet, oral, BID  [Held by provider] warfarin, 5 mg, oral, Daily  white petrolatum, 2 Application, Topical, BID  [2]    [3] PRN medications: albuterol, cyclobenzaprine, diphenhydrAMINE, HYDROmorphone, ondansetron, oxyCODONE, oxygen

## 2025-08-03 NOTE — CARE PLAN
Problem: Pain - Adult  Goal: Verbalizes/displays adequate comfort level or baseline comfort level  Outcome: Progressing     Problem: Safety - Adult  Goal: Free from fall injury  Outcome: Progressing     Problem: Discharge Planning  Goal: Discharge to home or other facility with appropriate resources  Outcome: Progressing     Problem: Chronic Conditions and Co-morbidities  Goal: Patient's chronic conditions and co-morbidity symptoms are monitored and maintained or improved  Outcome: Progressing     Problem: Nutrition  Goal: Nutrient intake appropriate for maintaining nutritional needs  Outcome: Progressing     Problem: Pain  Goal: Takes deep breaths with improved pain control throughout the shift  Outcome: Progressing  Goal: Turns in bed with improved pain control throughout the shift  Outcome: Progressing  Goal: Walks with improved pain control throughout the shift  Outcome: Progressing  Goal: Performs ADL's with improved pain control throughout shift  Outcome: Progressing  Goal: Participates in PT with improved pain control throughout the shift  Outcome: Progressing  Goal: Free from opioid side effects throughout the shift  Outcome: Progressing  Goal: Free from acute confusion related to pain meds throughout the shift  Outcome: Progressing     Problem: Fall/Injury  Goal: Not fall by end of shift  Outcome: Progressing  Goal: Be free from injury by end of the shift  Outcome: Progressing  Goal: Verbalize understanding of personal risk factors for fall in the hospital  Outcome: Progressing  Goal: Verbalize understanding of risk factor reduction measures to prevent injury from fall in the home  Outcome: Progressing  Goal: Use assistive devices by end of the shift  Outcome: Progressing  Goal: Pace activities to prevent fatigue by end of the shift  Outcome: Progressing   The patient's goals for the shift include      The clinical goals for the shift include Pt will remain safe in room and use call light during shift on  8/3/25 @ 1930    Pt continued pain medication. VSS

## 2025-08-04 ENCOUNTER — TELEPHONE (OUTPATIENT)
Dept: ADMISSION | Facility: HOSPITAL | Age: 55
End: 2025-08-04
Payer: COMMERCIAL

## 2025-08-04 DIAGNOSIS — D57.00 SICKLE CELL CRISIS (MULTI): ICD-10-CM

## 2025-08-04 LAB
ALBUMIN SERPL BCP-MCNC: 3.2 G/DL (ref 3.4–5)
ALP SERPL-CCNC: 100 U/L (ref 33–110)
ALT SERPL W P-5'-P-CCNC: 4 U/L (ref 7–45)
ANION GAP SERPL CALC-SCNC: 8 MMOL/L (ref 10–20)
AST SERPL W P-5'-P-CCNC: 12 U/L (ref 9–39)
BASOPHILS # BLD AUTO: 0.02 X10*3/UL (ref 0–0.1)
BASOPHILS NFR BLD AUTO: 0.3 %
BILIRUB SERPL-MCNC: 1.9 MG/DL (ref 0–1.2)
BUN SERPL-MCNC: 16 MG/DL (ref 6–23)
CALCIUM SERPL-MCNC: 8.6 MG/DL (ref 8.6–10.6)
CHLORIDE SERPL-SCNC: 98 MMOL/L (ref 98–107)
CO2 SERPL-SCNC: 35 MMOL/L (ref 21–32)
CREAT SERPL-MCNC: 1.11 MG/DL (ref 0.5–1.05)
EGFRCR SERPLBLD CKD-EPI 2021: 58 ML/MIN/1.73M*2
EOSINOPHIL # BLD AUTO: 0.35 X10*3/UL (ref 0–0.7)
EOSINOPHIL NFR BLD AUTO: 4.5 %
ERYTHROCYTE [DISTWIDTH] IN BLOOD BY AUTOMATED COUNT: 26.3 % (ref 11.5–14.5)
GLUCOSE SERPL-MCNC: 85 MG/DL (ref 74–99)
HCT VFR BLD AUTO: 27.2 % (ref 36–46)
HGB BLD-MCNC: 8.2 G/DL (ref 12–16)
HGB RETIC QN: 19 PG (ref 28–38)
HYPOCHROMIA BLD QL SMEAR: NORMAL
IMM GRANULOCYTES # BLD AUTO: 0.02 X10*3/UL (ref 0–0.7)
IMM GRANULOCYTES NFR BLD AUTO: 0.3 % (ref 0–0.9)
IMMATURE RETIC FRACTION: 12.6 %
INR PPP: 1.9 (ref 0.9–1.1)
LDH SERPL L TO P-CCNC: 202 U/L (ref 84–246)
LYMPHOCYTES # BLD AUTO: 1.5 X10*3/UL (ref 1.2–4.8)
LYMPHOCYTES NFR BLD AUTO: 19.2 %
MCH RBC QN AUTO: 26.7 PG (ref 26–34)
MCHC RBC AUTO-ENTMCNC: 30.1 G/DL (ref 32–36)
MCV RBC AUTO: 89 FL (ref 80–100)
MONOCYTES # BLD AUTO: 0.72 X10*3/UL (ref 0.1–1)
MONOCYTES NFR BLD AUTO: 9.2 %
NEUTROPHILS # BLD AUTO: 5.22 X10*3/UL (ref 1.2–7.7)
NEUTROPHILS NFR BLD AUTO: 66.5 %
NRBC BLD-RTO: 26.1 /100 WBCS (ref 0–0)
OVALOCYTES BLD QL SMEAR: NORMAL
PLATELET # BLD AUTO: 211 X10*3/UL (ref 150–450)
POLYCHROMASIA BLD QL SMEAR: NORMAL
POTASSIUM SERPL-SCNC: 4.8 MMOL/L (ref 3.5–5.3)
PROT SERPL-MCNC: 6.1 G/DL (ref 6.4–8.2)
PROTHROMBIN TIME: 20.7 SECONDS (ref 9.8–12.4)
RBC # BLD AUTO: 3.07 X10*6/UL (ref 4–5.2)
RBC MORPH BLD: NORMAL
RETICS #: 0.46 X10*6/UL (ref 0.02–0.08)
RETICS/RBC NFR AUTO: 15 % (ref 0.5–2)
SODIUM SERPL-SCNC: 136 MMOL/L (ref 136–145)
TARGETS BLD QL SMEAR: NORMAL
WBC # BLD AUTO: 7.8 X10*3/UL (ref 4.4–11.3)

## 2025-08-04 PROCEDURE — 36415 COLL VENOUS BLD VENIPUNCTURE: CPT | Performed by: PHYSICIAN ASSISTANT

## 2025-08-04 PROCEDURE — 83880 ASSAY OF NATRIURETIC PEPTIDE: CPT

## 2025-08-04 PROCEDURE — 2500000004 HC RX 250 GENERAL PHARMACY W/ HCPCS (ALT 636 FOR OP/ED): Mod: JZ,TB | Performed by: PHYSICIAN ASSISTANT

## 2025-08-04 PROCEDURE — 2500000004 HC RX 250 GENERAL PHARMACY W/ HCPCS (ALT 636 FOR OP/ED): Mod: JZ,TB

## 2025-08-04 PROCEDURE — 2500000001 HC RX 250 WO HCPCS SELF ADMINISTERED DRUGS (ALT 637 FOR MEDICARE OP)

## 2025-08-04 PROCEDURE — 1170000001 HC PRIVATE ONCOLOGY ROOM DAILY

## 2025-08-04 PROCEDURE — 83615 LACTATE (LD) (LDH) ENZYME: CPT | Performed by: PHYSICIAN ASSISTANT

## 2025-08-04 PROCEDURE — 85610 PROTHROMBIN TIME: CPT | Performed by: PHYSICIAN ASSISTANT

## 2025-08-04 PROCEDURE — 85025 COMPLETE CBC W/AUTO DIFF WBC: CPT | Performed by: PHYSICIAN ASSISTANT

## 2025-08-04 PROCEDURE — 99233 SBSQ HOSP IP/OBS HIGH 50: CPT

## 2025-08-04 PROCEDURE — 2500000002 HC RX 250 W HCPCS SELF ADMINISTERED DRUGS (ALT 637 FOR MEDICARE OP, ALT 636 FOR OP/ED)

## 2025-08-04 PROCEDURE — 2500000001 HC RX 250 WO HCPCS SELF ADMINISTERED DRUGS (ALT 637 FOR MEDICARE OP): Performed by: PHYSICIAN ASSISTANT

## 2025-08-04 PROCEDURE — 85045 AUTOMATED RETICULOCYTE COUNT: CPT | Performed by: PHYSICIAN ASSISTANT

## 2025-08-04 PROCEDURE — 80053 COMPREHEN METABOLIC PANEL: CPT | Performed by: PHYSICIAN ASSISTANT

## 2025-08-04 RX ORDER — OXYCODONE HYDROCHLORIDE 10 MG/1
10 TABLET ORAL
Refills: 0 | Status: DISCONTINUED | OUTPATIENT
Start: 2025-08-04 | End: 2025-08-05 | Stop reason: HOSPADM

## 2025-08-04 RX ORDER — OXYCODONE HYDROCHLORIDE 10 MG/1
10 TABLET ORAL EVERY 4 HOURS PRN
Qty: 75 TABLET | Refills: 0 | Status: SHIPPED | OUTPATIENT
Start: 2025-08-04 | End: 2025-08-16

## 2025-08-04 RX ORDER — HYDROMORPHONE HYDROCHLORIDE 1 MG/ML
1 INJECTION, SOLUTION INTRAMUSCULAR; INTRAVENOUS; SUBCUTANEOUS EVERY 6 HOURS PRN
Status: COMPLETED | OUTPATIENT
Start: 2025-08-04 | End: 2025-08-04

## 2025-08-04 RX ADMIN — FUROSEMIDE 20 MG: 20 TABLET ORAL at 09:01

## 2025-08-04 RX ADMIN — HYDROMORPHONE HYDROCHLORIDE 1 MG: 1 INJECTION, SOLUTION INTRAMUSCULAR; INTRAVENOUS; SUBCUTANEOUS at 17:46

## 2025-08-04 RX ADMIN — WARFARIN SODIUM 5 MG: 5 TABLET ORAL at 17:46

## 2025-08-04 RX ADMIN — CETIRIZINE HYDROCHLORIDE 10 MG: 10 TABLET ORAL at 09:01

## 2025-08-04 RX ADMIN — OXYCODONE HYDROCHLORIDE 10 MG: 10 TABLET ORAL at 20:51

## 2025-08-04 RX ADMIN — OXYCODONE HYDROCHLORIDE 10 MG: 10 TABLET ORAL at 14:24

## 2025-08-04 RX ADMIN — FOLIC ACID 1 MG: 1 TABLET ORAL at 09:01

## 2025-08-04 RX ADMIN — HYDROMORPHONE HYDROCHLORIDE 1 MG: 1 INJECTION, SOLUTION INTRAMUSCULAR; INTRAVENOUS; SUBCUTANEOUS at 09:01

## 2025-08-04 RX ADMIN — HYDROMORPHONE HYDROCHLORIDE 1 MG: 1 INJECTION, SOLUTION INTRAMUSCULAR; INTRAVENOUS; SUBCUTANEOUS at 02:38

## 2025-08-04 RX ADMIN — OXYCODONE HYDROCHLORIDE 10 MG: 10 TABLET ORAL at 10:57

## 2025-08-04 RX ADMIN — DIPHENHYDRAMINE HYDROCHLORIDE 25 MG: 25 CAPSULE ORAL at 00:15

## 2025-08-04 RX ADMIN — METOPROLOL TARTRATE 25 MG: 25 TABLET, FILM COATED ORAL at 20:51

## 2025-08-04 RX ADMIN — OXYCODONE HYDROCHLORIDE 10 MG: 10 TABLET ORAL at 00:04

## 2025-08-04 RX ADMIN — Medication: at 20:59

## 2025-08-04 RX ADMIN — METOPROLOL TARTRATE 25 MG: 25 TABLET, FILM COATED ORAL at 09:01

## 2025-08-04 RX ADMIN — PANTOPRAZOLE SODIUM 40 MG: 40 TABLET, DELAYED RELEASE ORAL at 05:40

## 2025-08-04 RX ADMIN — HYDROMORPHONE HYDROCHLORIDE 1 MG: 1 INJECTION, SOLUTION INTRAMUSCULAR; INTRAVENOUS; SUBCUTANEOUS at 23:53

## 2025-08-04 RX ADMIN — OXYCODONE HYDROCHLORIDE 10 MG: 10 TABLET ORAL at 05:40

## 2025-08-04 ASSESSMENT — PAIN - FUNCTIONAL ASSESSMENT
PAIN_FUNCTIONAL_ASSESSMENT: 0-10

## 2025-08-04 ASSESSMENT — PAIN SCALES - GENERAL
PAINLEVEL_OUTOF10: 8
PAINLEVEL_OUTOF10: 8
PAINLEVEL_OUTOF10: 7
PAINLEVEL_OUTOF10: 7
PAINLEVEL_OUTOF10: 8
PAINLEVEL_OUTOF10: 8
PAINLEVEL_OUTOF10: 7
PAINLEVEL_OUTOF10: 8
PAINLEVEL_OUTOF10: 7
PAINLEVEL_OUTOF10: 8

## 2025-08-04 ASSESSMENT — COGNITIVE AND FUNCTIONAL STATUS - GENERAL
MOBILITY SCORE: 24
MOBILITY SCORE: 24
DAILY ACTIVITIY SCORE: 24
DAILY ACTIVITIY SCORE: 24

## 2025-08-04 NOTE — TELEPHONE ENCOUNTER
Pt requesting refill   Oxycodone 10mg. 1 tablet every 4 hrs prn  Pharmacy: [facility] [address] in [place]  Pt is currently hospitalized with plan DC tomorrow.

## 2025-08-04 NOTE — CARE PLAN
The patient's goals for the shift include      The clinical goals for the shift include Pt will have decreased pain through end of shift 8/4/2025 0700      Problem: Pain - Adult  Goal: Verbalizes/displays adequate comfort level or baseline comfort level  Outcome: Progressing     Problem: Safety - Adult  Goal: Free from fall injury  Outcome: Progressing     Problem: Discharge Planning  Goal: Discharge to home or other facility with appropriate resources  Outcome: Progressing     Problem: Chronic Conditions and Co-morbidities  Goal: Patient's chronic conditions and co-morbidity symptoms are monitored and maintained or improved  Outcome: Progressing     Problem: Nutrition  Goal: Nutrient intake appropriate for maintaining nutritional needs  Outcome: Progressing     Problem: Pain  Goal: Takes deep breaths with improved pain control throughout the shift  Outcome: Progressing  Goal: Turns in bed with improved pain control throughout the shift  Outcome: Progressing  Goal: Walks with improved pain control throughout the shift  Outcome: Progressing  Goal: Performs ADL's with improved pain control throughout shift  Outcome: Progressing  Goal: Participates in PT with improved pain control throughout the shift  Outcome: Progressing  Goal: Free from opioid side effects throughout the shift  Outcome: Progressing  Goal: Free from acute confusion related to pain meds throughout the shift  Outcome: Progressing     Problem: Fall/Injury  Goal: Not fall by end of shift  Outcome: Progressing  Goal: Be free from injury by end of the shift  Outcome: Progressing  Goal: Verbalize understanding of personal risk factors for fall in the hospital  Outcome: Progressing  Goal: Verbalize understanding of risk factor reduction measures to prevent injury from fall in the home  Outcome: Progressing  Goal: Use assistive devices by end of the shift  Outcome: Progressing  Goal: Pace activities to prevent fatigue by end of the shift  Outcome:  Progressing

## 2025-08-04 NOTE — PROGRESS NOTES
"Name: Senait Narvaez  MRN: [id number]  Encounter Date: 2025  PCP: No Assigned PCP Generic Provider, MD  Heme-Onc: Dr. Erich Dos Santos    Reason for consult: Sickle cell crisis  Attending Provider Dr. Shala Cr     Hematology/ Oncology Consult Daily Progress Note    Overnight Events   Patient removed from NC this AM    Oncologic History   HgbSC     Review of Systems   12 Point ROS negative except HPI     Allergies   Allergies[1]    Medications     cetirizine, 10 mg, Daily  folic acid, 1 mg, Daily  furosemide, 20 mg, Every other day  metoprolol tartrate, 25 mg, BID  pantoprazole, 40 mg, Daily before breakfast  polyethylene glycol, 17 g, Daily  sennosides-docusate sodium, 2 tablet, BID  warfarin, 5 mg, Daily  white petrolatum, 2 Application, BID         albuterol, 2 puff, q6h PRN  cyclobenzaprine, 10 mg, TID PRN  diphenhydrAMINE, 25 mg, q6h PRN  HYDROmorphone, 1 mg, q6h PRN  ondansetron, 8 mg, q8h PRN  oxyCODONE, 10 mg, q3h PRN  oxygen, 1 Dose, Continuous - O2/gases        Physical Exam   Blood pressure 120/58, pulse 66, temperature 36.6 °C (97.9 °F), temperature source Temporal, resp. rate 18, height 1.651 m (5' 5\"), weight 98.9 kg (218 lb), SpO2 97%.    ECO    Gen: awake, alert, +distress  HEENT: AT/NC, EOMI  CV: RRR  LUNGS: +bibasilar crackles  Skin: warm and dry  Neuro: A&Ox4, moves all 4 extremities spontaneously     Labs     Lab Results   Component Value Date    GLUCOSE 85 2025    CALCIUM 8.6 2025     2025    K 4.8 2025    CO2 35 (H) 2025    CL 98 2025    BUN 16 2025    CREATININE 1.11 (H) 2025       Lab Results   Component Value Date    WBC 7.8 2025    HGB 8.2 (L) 2025    HCT 27.2 (L) 2025    MCV 89 2025     2025       Lab Results   Component Value Date    ALT 4 (L) 2025    AST 12 2025    ALKPHOS 100 2025    BILITOT 1.9 (H) 2025         Imaging   No results found.     Assessment/Plan     Senait " Solange is a 55 y.o. female w/ a PMH of HgbSC who presented due to desatting to 70s on room air and diffuse body pain primarily in the bilateral lower extremities and lower back. Hematology consulted due to sickle cell crisis. Pt with improving respiratory status today, satting at 93 on room air. Hgb stable at 8.2, LDH at 202. Retic % increasing at 15.0% from 12.6% yesterday 8/3, admitted at 9.3%. Patient continues to endorse persistent pain; currently on oxycodone 10 mg PO q4h PRN and Dilaudid 1 mg IV q6h PRN and has utilized the latter during each available dose.    PLAN:  Encourage pt to utilize PO medications, wean patient from IV to PO pain meds before discharge  C/w oxycodone 10 mg PO q3h PRN  C/w Zofran 8 mg q8h PRN  C/w Benadryl 25 mg q6h PRN, avoid IV if possible  C/w cyclobenzaprine 10 mg q8h PRN  C/w folic acid  C/w scheduled Miralax and Nita-Colace    Thank you for this consult, we will sign off. Patient seen and discussed with attending physician, Dr. Avelar.    Alpa Del Angle MD  Internal Medicine PGY-1  Hematology Consult Pager: [phone]  Oncology Consult Pager: [phone]       [1]   Allergies  Allergen Reactions    Vancomycin Rash    Oxycontin [Oxycodone] Drowsiness

## 2025-08-04 NOTE — CARE PLAN
The patient's goals for the shift include      The clinical goals for the shift include Pt will remain HDS and VSS throughout shift end on 8/4/25 @1900      Problem: Pain - Adult  Goal: Verbalizes/displays adequate comfort level or baseline comfort level  Outcome: Progressing     Problem: Safety - Adult  Goal: Free from fall injury  Outcome: Progressing     Problem: Discharge Planning  Goal: Discharge to home or other facility with appropriate resources  Outcome: Progressing     Problem: Chronic Conditions and Co-morbidities  Goal: Patient's chronic conditions and co-morbidity symptoms are monitored and maintained or improved  Outcome: Progressing     Problem: Nutrition  Goal: Nutrient intake appropriate for maintaining nutritional needs  Outcome: Progressing     Problem: Pain  Goal: Takes deep breaths with improved pain control throughout the shift  Outcome: Progressing  Goal: Turns in bed with improved pain control throughout the shift  Outcome: Progressing  Goal: Walks with improved pain control throughout the shift  Outcome: Progressing  Goal: Performs ADL's with improved pain control throughout shift  Outcome: Progressing  Goal: Participates in PT with improved pain control throughout the shift  Outcome: Progressing  Goal: Free from opioid side effects throughout the shift  Outcome: Progressing  Goal: Free from acute confusion related to pain meds throughout the shift  Outcome: Progressing     Problem: Fall/Injury  Goal: Not fall by end of shift  Outcome: Progressing  Goal: Be free from injury by end of the shift  Outcome: Progressing  Goal: Verbalize understanding of personal risk factors for fall in the hospital  Outcome: Progressing  Goal: Verbalize understanding of risk factor reduction measures to prevent injury from fall in the home  Outcome: Progressing  Goal: Use assistive devices by end of the shift  Outcome: Progressing  Goal: Pace activities to prevent fatigue by end of the shift  Outcome:  Progressing

## 2025-08-04 NOTE — SIGNIFICANT EVENT
Hematology Sign Off  Senait Narvaez is a 55 y.o. female with PMH s/f HgbSC on routine exchange transfusion q6wks (last exchange 6/19), recurrent VTE/PE w/ SVC syndrome (on warfarin), and pulmonary hypertension who presented on 7/3 due to hypoxia on room air and diffuse body pain primarily in the bilateral lower extremities and lower back.  Now status post exchange transfusion, with improving pain on mostly oral medications. As she planned for discharge tomorrow, hematology will sign off. Please reach out if new issues.

## 2025-08-04 NOTE — PROGRESS NOTES
Spiritual Care Visit  Spiritual Care Request    Reason for Visit:  Spiritual and emotional support          Care Provided:    Patient reports she had a history of putting off hospitalization when experiencing a sickle cell crisis.   The last time she did this, she ended up in the hospital for 41 days.   She related to a saying from [employer] - How do you know what the will of God is?  When you keep running into a brick wall -turn left.     She reported she did that with this hospital stay and is listening to her family about exercising improved self care.   Patient has a grand daughter that fills her life with meaning and purpose.   Patient experiences a range of emotion during visit and thanks  for visiting.        Sense of Community and or Restorationism Affiliation:  Darling

## 2025-08-04 NOTE — PROGRESS NOTES
"Senait Narvaez is a 55 y.o. female on day 7 of admission presenting with Sickle cell anemia with pain.    Subjective   Seen at bedside, reports pain continues in b/l leg.We discussed improvement in labs, hemolysis at baseline, utilizing home oxycodone more (per chart review only used 1 dose yesterday), and weaning off IV pain medicine (3 doses left to be used) for discharge home tomorrow morning, she is agreeable.     Denies nausea, vomiting, fever, chills, chest pain, SOB, bleeding, edema, headaches, abd pain. Eating and drinking well.        Objective     Physical Exam  Constitutional:       Appearance: She is obese.   HENT:      Head: Normocephalic.      Right Ear: External ear normal.      Left Ear: External ear normal.      Nose: Nose normal.     Eyes:      General: Scleral icterus present.      Extraocular Movements: Extraocular movements intact.       Cardiovascular:      Rate and Rhythm: Normal rate and regular rhythm.   Pulmonary:      Effort: Pulmonary effort is normal.      Breath sounds: Normal breath sounds.   Abdominal:      Palpations: Abdomen is soft.     Musculoskeletal:         General: Normal range of motion.      Cervical back: Normal range of motion and neck supple.     Skin:     General: Skin is warm and dry.     Neurological:      General: No focal deficit present.      Mental Status: She is alert and oriented to person, place, and time. Mental status is at baseline.     Psychiatric:         Mood and Affect: Mood normal.         Behavior: Behavior normal.         Thought Content: Thought content normal.         Judgment: Judgment normal.         Last Recorded Vitals  Blood pressure 120/58, pulse 66, temperature 36.6 °C (97.9 °F), temperature source Temporal, resp. rate 18, height 1.651 m (5' 5\"), weight 98.9 kg (218 lb), SpO2 97%.  Intake/Output last 3 Shifts:  I/O last 3 completed shifts:  In: 1000 (10.1 mL/kg) [P.O.:1000]  Out: - (0 mL/kg)   Weight: 98.9 kg     Relevant Results           "   .Scheduled medications  Scheduled Medications[1]  Continuous medications  Continuous Medications[2]  PRN medications  PRN Medications[3]    .  Results for orders placed or performed during the hospital encounter of 07/28/25 (from the past 24 hours)   Protime-INR   Result Value Ref Range    Protime 20.7 (H) 9.8 - 12.4 seconds    INR 1.9 (H) 0.9 - 1.1   Reticulocytes   Result Value Ref Range    Retic % 15.0 (H) 0.5 - 2.0 %    Retic Absolute 0.460 (H) 0.018 - 0.083 x10*6/uL    Reticulocyte Hemoglobin 19 (L) 28 - 38 pg    Immature Retic fraction 12.6 <=16.0 %   Lactate dehydrogenase   Result Value Ref Range     84 - 246 U/L   CBC and Auto Differential   Result Value Ref Range    WBC 7.8 4.4 - 11.3 x10*3/uL    nRBC 26.1 (H) 0.0 - 0.0 /100 WBCs    RBC 3.07 (L) 4.00 - 5.20 x10*6/uL    Hemoglobin 8.2 (L) 12.0 - 16.0 g/dL    Hematocrit 27.2 (L) 36.0 - 46.0 %    MCV 89 80 - 100 fL    MCH 26.7 26.0 - 34.0 pg    MCHC 30.1 (L) 32.0 - 36.0 g/dL    RDW 26.3 (H) 11.5 - 14.5 %    Platelets 211 150 - 450 x10*3/uL    Neutrophils % 66.5 40.0 - 80.0 %    Immature Granulocytes %, Automated 0.3 0.0 - 0.9 %    Lymphocytes % 19.2 13.0 - 44.0 %    Monocytes % 9.2 2.0 - 10.0 %    Eosinophils % 4.5 0.0 - 6.0 %    Basophils % 0.3 0.0 - 2.0 %    Neutrophils Absolute 5.22 1.20 - 7.70 x10*3/uL    Immature Granulocytes Absolute, Automated 0.02 0.00 - 0.70 x10*3/uL    Lymphocytes Absolute 1.50 1.20 - 4.80 x10*3/uL    Monocytes Absolute 0.72 0.10 - 1.00 x10*3/uL    Eosinophils Absolute 0.35 0.00 - 0.70 x10*3/uL    Basophils Absolute 0.02 0.00 - 0.10 x10*3/uL   Comprehensive Metabolic Panel   Result Value Ref Range    Glucose 85 74 - 99 mg/dL    Sodium 136 136 - 145 mmol/L    Potassium 4.8 3.5 - 5.3 mmol/L    Chloride 98 98 - 107 mmol/L    Bicarbonate 35 (H) 21 - 32 mmol/L    Anion Gap 8 (L) 10 - 20 mmol/L    Urea Nitrogen 16 6 - 23 mg/dL    Creatinine 1.11 (H) 0.50 - 1.05 mg/dL    eGFR 58 (L) >60 mL/min/1.73m*2    Calcium 8.6 8.6 - 10.6  mg/dL    Albumin 3.2 (L) 3.4 - 5.0 g/dL    Alkaline Phosphatase 100 33 - 110 U/L    Total Protein 6.1 (L) 6.4 - 8.2 g/dL    AST 12 9 - 39 U/L    Bilirubin, Total 1.9 (H) 0.0 - 1.2 mg/dL    ALT 4 (L) 7 - 45 U/L   Morphology   Result Value Ref Range    RBC Morphology See Below     Polychromasia Mild     Hypochromia Mild     Target Cells Few     Ovalocytes Few      *Note: Due to a large number of results and/or encounters for the requested time period, some results have not been displayed. A complete set of results can be found in Results Review.             Assessment & Plan  Iron deficiency anemia    Senait Narvaez is a 55 y.o. 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 She reported sickle cell pain and HA, CTH (7/28) negative. CXR (7/28) pulm edema, fullness to the hilia could be r/t dilated pulm arterial tree 2/2 pulm HTN. Hgb and lysis labs initially stable on admit, elevated on 7/30 now returned to baseline. Started on IV dilaudid and oxy for pain management. Pt went into symptomatic (hypotensive) SVT (known hx, on home metop) over night 7/28-7/29, resolved with vagal maneuvers. She was due for outpt RBCEx 7/31, completed inpatient on 7/29. 8/2 BLE lower US w/o evidence of acute process. S/p walking pulse ox test 8/2 w/o needs for supplemental O2, remained >90% on RA. Home coumadin restarted 8/4 d/t fluctuating INR stability. DC home pending improvement in pain tomorrow likely 8/5.      # Hb SC disease with acute on chronic pain  - Presented to ED with uncontrolled typical sickle cell pain and desaturation and was  placed on 3L of O2  - Currently on q6 week RBC exchange transfusions. Last transfusion was on 6/19. Next exchange transfusions as scheduled  on 7/31/2025--> s/p RBCex on 7/29, line removed 8/2  - [facility] reviewed, last updated 7/28/25: IV Dilaudid 1-1.5 mg every 2-3 hours prn for severe pain  and 0.75-1 mg every 2-3 hrs for moderate pain.  - OARRS reviewed, no aberrant behavior noted    - Hgb BL ~8, Hgb 9.5 on admission (7/28)--> 8.5 (8/2)- no indication for simple transfusion   - Tbili BL ~ 1.0,Tbili 4.4 on admission (7/28)--> 2.2 (8/2)   - LDH ~150-200,  on admission (7/28)--> 180 (8/2)   - CXR (7/28): pulm edema, fullness to the hilia could be r/t dilated pulm arterial tree 2/2 pulm HTN  - CT head (7/28): negative for acute process  - Continue Folic Acid 1mg daily and Cyclobenzaprine 10mg TID PRN muscle spasms  - Bowel regimen for opioid induced constipation with DocuSenna 2tabs BID and Miralax daily  - c/w oxycodone 10mg q3 PRN severe pain and 1mg IVP dilaudid q6 PRN breakthrough, leaving x3 doses of IV left 8/4  - PO Benadryl 25 mg q6h PRN for opioid-induced pruritus, PO Zofran 8 mg q8h PRN for opioid-induced nausea   - Utox (7/28): appropriately positive  - Hgb S (7/28): 27.1%, Post-exchange Hgb S -12%, hgb C 11.7%  - CRP 4.87/ESR 20 (7/29)  - IS encouraged    - BLE lower US duplex negative for DVT 8/2     # CKD II  - sCr BL fluctuates but ~1; sCr 1.73 (7/29)--> 2.10 (7/30) --> 2.74 (7/31) ---> 2.15 (8/1) --> 8/2 1.60  - UA - 2+ bacteria (7/29), culture contaminated   - Urine electrolytes on 7/29 indication pre-renal etiology   - per attending plan for 250mL bolus on 7/30 as pt with history of fluid overload and pulmonary edema noted on CXR 7/28  - Plan: CTM as downtrending      # Acute SVT with hx SVT  - Over night 7/28-7/29 pt with episode of SVT HR 180s, improved spontaneously with vagal maneuvers  - EKG (7/28): showing SVT  - Pt with hx of SVT and follows with cardiology  - Continue home Metop  Tartrate 25mg BID  - She was discharged from last hospital admission with holter monitor for palpitations and SVT, which has been controlled with vagal maneuvers  - Continue telemetry monitoring  - Cardiology FUV 8/25     # Known L Breast Mass  - Saw Gracy Barr PA-C outpt on 1/17/25  - Imaging showing likely benign L breast mass with plan for repeat b/l mammogram around 7/29/25  - Planned for repeat imaging outpatient. FUV with surg onc FUV 8/1, patient will need to reschedule      # HFpEF   # pHTN   # Hx of troponinemia   - Last TTE 5/9/25: HFpEF with EF of 67% right ventricular overloaded, severely enlarged RV    - Troponin leak likely iso demand   - Continue Lasix 20mg every other day  - CXR (7/28): Pulm edema, fullness to the hilia could be r/t dilated pulm arterial tree 2/2 pulm HTN  - Will give IV Lasix PRN in addition to home PO dosing every other day     # Hx of DVT/PE   # Hx of SVC syndrome  - on home Warfarin 5mg daily  - INR 2.9 (7/29) --> 3.5 (7/30)  - INR 5.3 (7/31), Coumadin was held (7/31- current)  - INR 4.8, Coumadin is on hold,   - INR 3.6 8/2, Coumadin on hold  - INR 2.6 (8/2) --> (8/4) 1.9 -- restarted home coumadin 8/4  - follows with coumadin clinic     # CAN  # Supplemental O2 use  - Continue home 2L NC and CPAP at night  - Currently weaned to 0.5L NC as of 8/2, previously 3L NC, plan to titrate off  - Walking pulse ox pending, may need continuous oxygen for home going   - 8/3 passed walking pulse ox test      # DVT Prophy   - Therapeutic Warfarin, SCDs, encourage safe ambulation     # DISPO:  - Full code  - DC home resumed nox O2 pending improvement in pain and INR stability, 8/5 Tues  - Surg Onc FUV 8/1 (patient will need to reschedule), Ophthalmology 8/6, Cardiology 8/25, 9/2 RBC exchange, will request sickle cell clinic prior to discharge     Assessment and plan not finalized until discussed with attending physician Dr. Homeida     I spent >60 minutes in the professional and overall  care of this patient.      Marisa Brizuela, APRN-CNP         [1] cetirizine, 10 mg, oral, Daily  folic acid, 1 mg, oral, Daily  furosemide, 20 mg, oral, Every other day  metoprolol tartrate, 25 mg, oral, BID  pantoprazole, 40 mg, oral, Daily before breakfast  polyethylene glycol, 17 g, oral, Daily  sennosides-docusate sodium, 2 tablet, oral, BID  warfarin, 5 mg, oral, Daily  white petrolatum, 2 Application, Topical, BID  [2]    [3] PRN medications: albuterol, cyclobenzaprine, diphenhydrAMINE, HYDROmorphone, ondansetron, oxyCODONE, oxygen

## 2025-08-04 NOTE — PROGRESS NOTES
07/29/25 0900   Discharge Planning   Living Arrangements Alone   Support Systems Children;Parent;Family members   Type of Residence Private residence   Home or Post Acute Services In home services   Type of Home Care Services DME or oxygen   Expected Discharge Disposition Home   Does the patient need discharge transport arranged? Yes   [facility] coordination needed? Yes   Has discharge transport been arranged? No   Transportation Needs   In the past 12 months, has lack of transportation kept you from medical appointments or from getting medications? yes   In the past 12 months, has lack of transportation kept you from meetings, work, or from getting things needed for daily living? Yes   Patient Choice   Patient / Family choosing to utilize agency / facility established prior to hospitalization Yes  (pt active with [facility] for nocturnal O2)     Care Transitions Note  07/29/25  Plan per Medical/Surgical Team: sickle cell pain crisis, SBVT, labs okay, poss exchange?  Status: Inpatient  Payor Source: [employer] Dual Complete primary and secondary  Discharge disposition: Home  Expected date of discharge: end of week?  Barriers: pain mgmt  PCP / Primary Oncologist: Jovana  DME: rollator PRN  O2: active with [facility] nocturnal O2    Patient admitted for sickle cell pain crisis. Pt is independent with care at baseline. Normally lives by herself in an apartment. She is well supported by family, including her mother and daughter. She is not active with HC at this time but she had [facility] in the past. No anticipated dc needs at this time. SW to follow.   VIRIDIANA Zamorano, YULI     08/04/25  Patient planned for dc today vs tomorrow. She does not qualify for continuous O2 at this time. SW to follow for any dc needs.   VIRIDIANA Zamorano, LSW

## 2025-08-05 VITALS
RESPIRATION RATE: 18 BRPM | TEMPERATURE: 98.6 F | WEIGHT: 218 LBS | HEIGHT: 65 IN | SYSTOLIC BLOOD PRESSURE: 123 MMHG | OXYGEN SATURATION: 94 % | BODY MASS INDEX: 36.32 KG/M2 | HEART RATE: 65 BPM | DIASTOLIC BLOOD PRESSURE: 78 MMHG

## 2025-08-05 LAB
ALBUMIN SERPL BCP-MCNC: 3.1 G/DL (ref 3.4–5)
ALP SERPL-CCNC: 91 U/L (ref 33–110)
ALT SERPL W P-5'-P-CCNC: 4 U/L (ref 7–45)
ANION GAP SERPL CALC-SCNC: <7 MMOL/L (ref 10–20)
AST SERPL W P-5'-P-CCNC: 14 U/L (ref 9–39)
BASOPHILS # BLD AUTO: 0.02 X10*3/UL (ref 0–0.1)
BASOPHILS NFR BLD AUTO: 0.3 %
BILIRUB SERPL-MCNC: 1.5 MG/DL (ref 0–1.2)
BNP SERPL-MCNC: 628 PG/ML (ref 0–99)
BUN SERPL-MCNC: 15 MG/DL (ref 6–23)
CALCIUM SERPL-MCNC: 8.6 MG/DL (ref 8.6–10.6)
CHLORIDE SERPL-SCNC: 97 MMOL/L (ref 98–107)
CO2 SERPL-SCNC: 41 MMOL/L (ref 21–32)
CREAT SERPL-MCNC: 1.13 MG/DL (ref 0.5–1.05)
EGFRCR SERPLBLD CKD-EPI 2021: 57 ML/MIN/1.73M*2
EOSINOPHIL # BLD AUTO: 0.3 X10*3/UL (ref 0–0.7)
EOSINOPHIL NFR BLD AUTO: 4.4 %
ERYTHROCYTE [DISTWIDTH] IN BLOOD BY AUTOMATED COUNT: 26.7 % (ref 11.5–14.5)
GLUCOSE SERPL-MCNC: 80 MG/DL (ref 74–99)
HCT VFR BLD AUTO: 25.6 % (ref 36–46)
HGB BLD-MCNC: 7.8 G/DL (ref 12–16)
HGB RETIC QN: 20 PG (ref 28–38)
IMM GRANULOCYTES # BLD AUTO: 0.03 X10*3/UL (ref 0–0.7)
IMM GRANULOCYTES NFR BLD AUTO: 0.4 % (ref 0–0.9)
IMMATURE RETIC FRACTION: 11.2 %
INR PPP: 1.7 (ref 0.9–1.1)
LDH SERPL L TO P-CCNC: 218 U/L (ref 84–246)
LYMPHOCYTES # BLD AUTO: 1.55 X10*3/UL (ref 1.2–4.8)
LYMPHOCYTES NFR BLD AUTO: 22.5 %
MCH RBC QN AUTO: 26.1 PG (ref 26–34)
MCHC RBC AUTO-ENTMCNC: 30.5 G/DL (ref 32–36)
MCV RBC AUTO: 86 FL (ref 80–100)
MONOCYTES # BLD AUTO: 0.65 X10*3/UL (ref 0.1–1)
MONOCYTES NFR BLD AUTO: 9.4 %
NEUTROPHILS # BLD AUTO: 4.34 X10*3/UL (ref 1.2–7.7)
NEUTROPHILS NFR BLD AUTO: 63 %
NRBC BLD-RTO: 26.7 /100 WBCS (ref 0–0)
PLATELET # BLD AUTO: 290 X10*3/UL (ref 150–450)
POTASSIUM SERPL-SCNC: 4.6 MMOL/L (ref 3.5–5.3)
PROT SERPL-MCNC: 6.1 G/DL (ref 6.4–8.2)
PROTHROMBIN TIME: 18.4 SECONDS (ref 9.8–12.4)
RBC # BLD AUTO: 2.99 X10*6/UL (ref 4–5.2)
RETICS #: 0.39 X10*6/UL (ref 0.02–0.08)
RETICS/RBC NFR AUTO: 13.2 % (ref 0.5–2)
SODIUM SERPL-SCNC: 136 MMOL/L (ref 136–145)
WBC # BLD AUTO: 6.9 X10*3/UL (ref 4.4–11.3)

## 2025-08-05 PROCEDURE — 85610 PROTHROMBIN TIME: CPT

## 2025-08-05 PROCEDURE — 36415 COLL VENOUS BLD VENIPUNCTURE: CPT

## 2025-08-05 PROCEDURE — 85045 AUTOMATED RETICULOCYTE COUNT: CPT

## 2025-08-05 PROCEDURE — 83615 LACTATE (LD) (LDH) ENZYME: CPT

## 2025-08-05 PROCEDURE — 80053 COMPREHEN METABOLIC PANEL: CPT

## 2025-08-05 PROCEDURE — 85025 COMPLETE CBC W/AUTO DIFF WBC: CPT

## 2025-08-05 PROCEDURE — 2500000001 HC RX 250 WO HCPCS SELF ADMINISTERED DRUGS (ALT 637 FOR MEDICARE OP): Performed by: PHYSICIAN ASSISTANT

## 2025-08-05 PROCEDURE — 2500000004 HC RX 250 GENERAL PHARMACY W/ HCPCS (ALT 636 FOR OP/ED): Mod: JZ,TB

## 2025-08-05 PROCEDURE — 2500000001 HC RX 250 WO HCPCS SELF ADMINISTERED DRUGS (ALT 637 FOR MEDICARE OP)

## 2025-08-05 PROCEDURE — 99239 HOSP IP/OBS DSCHRG MGMT >30: CPT | Performed by: INTERNAL MEDICINE

## 2025-08-05 RX ORDER — HYDROMORPHONE HYDROCHLORIDE 1 MG/ML
1 INJECTION, SOLUTION INTRAMUSCULAR; INTRAVENOUS; SUBCUTANEOUS ONCE
Status: COMPLETED | OUTPATIENT
Start: 2025-08-05 | End: 2025-08-05

## 2025-08-05 RX ADMIN — OXYCODONE HYDROCHLORIDE 10 MG: 10 TABLET ORAL at 03:15

## 2025-08-05 RX ADMIN — CETIRIZINE HYDROCHLORIDE 10 MG: 10 TABLET ORAL at 08:45

## 2025-08-05 RX ADMIN — HYDROMORPHONE HYDROCHLORIDE 0.5 MG: 1 INJECTION, SOLUTION INTRAMUSCULAR; INTRAVENOUS; SUBCUTANEOUS at 05:37

## 2025-08-05 RX ADMIN — HYDROMORPHONE HYDROCHLORIDE 1 MG: 1 INJECTION, SOLUTION INTRAMUSCULAR; INTRAVENOUS; SUBCUTANEOUS at 10:17

## 2025-08-05 RX ADMIN — OXYCODONE HYDROCHLORIDE 10 MG: 10 TABLET ORAL at 08:45

## 2025-08-05 RX ADMIN — FOLIC ACID 1 MG: 1 TABLET ORAL at 08:46

## 2025-08-05 RX ADMIN — PANTOPRAZOLE SODIUM 40 MG: 40 TABLET, DELAYED RELEASE ORAL at 05:37

## 2025-08-05 RX ADMIN — METOPROLOL TARTRATE 25 MG: 25 TABLET, FILM COATED ORAL at 08:45

## 2025-08-05 ASSESSMENT — ENCOUNTER SYMPTOMS
VOMITING: 0
LEG SWELLING: 0
BACK PAIN: 1
NUMBNESS: 0
CHEST TIGHTNESS: 0
NAUSEA: 0
ARTHRALGIAS: 1
PALPITATIONS: 0
HEMOPTYSIS: 0
WOUND: 0
MYALGIAS: 1
CONSTIPATION: 1
HEADACHES: 0
FEVER: 0
HEMATURIA: 0
COUGH: 0
LIGHT-HEADEDNESS: 0
BLOOD IN STOOL: 0
DEPRESSION: 0
SHORTNESS OF BREATH: 1
DIARRHEA: 0
FLANK PAIN: 0
WHEEZING: 0
EXTREMITY WEAKNESS: 0
FATIGUE: 1
ABDOMINAL PAIN: 0
SCLERAL ICTERUS: 0
BRUISES/BLEEDS EASILY: 0
NERVOUS/ANXIOUS: 0

## 2025-08-05 ASSESSMENT — COGNITIVE AND FUNCTIONAL STATUS - GENERAL
DAILY ACTIVITIY SCORE: 24
MOBILITY SCORE: 24

## 2025-08-05 ASSESSMENT — PAIN - FUNCTIONAL ASSESSMENT
PAIN_FUNCTIONAL_ASSESSMENT: 0-10

## 2025-08-05 ASSESSMENT — PAIN SCALES - GENERAL
PAINLEVEL_OUTOF10: 7
PAINLEVEL_OUTOF10: 8

## 2025-08-05 NOTE — DISCHARGE SUMMARY
Discharge Diagnosis  Sickle cell anemia with pain    Issues Requiring Follow-Up      Test Results Pending At Discharge  Pending Labs       No current pending labs.            Hospital Course    Senait Narvaez is a 55 y.o. 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 80s on room air), and typical sickle cell pain symptoms. She was placed on 3L NC oxygen and admitted for management.  Initial labs were stable; hemoglobin and hemolysis markers aamir on 7/30 but returned to baseline prior to discharge. Her scheduled exchange transfusion (originally planned for 7/31) was completed inpatient on 7/29. She developed a transient symptomatic episode of SVT overnight 7/28-7/29, which resolved with vagal maneuvers; this is consistent with her known history of paroxysmal SVT. She remained hemodynamically stable without further episodes. Pain was managed with IV dilaudid (1 mg q6h PRN) and resumed home oxycodone 10 mg q3h PRN, MS Contin, and adjunctive medications. Her hemoglobin S% decreased from 27% to 12% post-exchange. Renal function initially worsened (Scr peak 2.74 on 7/31) but improved to 1.6 by discharge with conservative fluid resuscitation. UA was notable for bacteria but urine culture was contaminated. No antibiotics were indicated. she had chronic home warfarin use for DVT/PE/SVC syndrome; INR peaked at 5.3 and was held, then restarted on 8/4 once INR trended back to 1.9. Oxygen requirements decreased over admission. She passed a walking oxygen test on 8/3 and was off supplemental oxygen at discharge. No new neurologic or respiratory concerns.    Visit Vitals  /78 (BP Location:  Left arm, Patient Position: Lying)   Pulse 65   Temp 37 °C (98.6 °F) (Temporal)   Resp 18     Vitals:    07/28/25 1307   Weight: 98.9 kg (218 lb)       Immunization History   Administered Date(s) Administered    COVID-19, mRNA, LNP-S, PF, 30 mcg/0.3 mL dose 12/04/2021    Flu vaccine (IIV4), preservative free *Check age/dose* 10/17/2017, 11/07/2018, 10/07/2019, 09/24/2020, 10/27/2021, 10/22/2022    Flu vaccine, trivalent, preservative free, age 6 months and greater (Fluarix/Fluzone/Flulaval) 01/11/2014, 03/06/2025    Hep A / Hep B 08/11/2016, 12/08/2016    HiB, unspecified 04/07/2015    Influenza, Unspecified 02/22/2001, 10/11/2002, 10/20/2003, 10/18/2006, 11/26/2012, 09/17/2014, 10/22/2022    Influenza, seasonal, injectable 10/11/2002, 10/20/2003, 10/18/2006, 11/26/2012, 09/18/2014, 09/29/2015, 09/27/2016    Meningococcal ACWY-D (Menactra) 4-valent conjugate vaccine 04/07/2015, 07/28/2015    Meningococcal, Unknown Serogroups 07/28/2015    PPD Test 07/01/2003    Pfizer Gray Cap SARS-CoV-2 04/20/2021, 05/11/2021    Pfizer Purple Cap SARS-CoV-2 04/20/2021, 05/11/2021    Pneumococcal Conjugate PCV 7 11/17/2014    Pneumococcal conjugate vaccine, 13-valent (PREVNAR 13) 07/28/2015, 10/17/2017    Pneumococcal polysaccharide vaccine, 23-valent, age 2 years and older (PNEUMOVAX 23) 11/12/2007, 10/01/2008, 04/17/2014    Tdap vaccine, age 7 year and older (BOOSTRIX, ADACEL) 04/24/2018       Results      Pertinent Physical Exam At Time of Discharge      Home Medications     Medication List      CONTINUE taking these medications     albuterol 90 mcg/actuation inhaler; Inhale 2 puffs every 6 hours if   needed for wheezing.   ascorbic acid 500 mg chewable tablet; Commonly known as: Vitamin C   atropine 1 % ophthalmic solution; Administer 1 drop into the left eye   once daily at bedtime. Please continue to administer to Left eye until   seen at outpt clinic with ophthalmology this upcoming week   cyclobenzaprine 10 mg tablet;  Commonly known as: Flexeril; Take 1 tablet   (10 mg) by mouth 3 times a day as needed for muscle spasms.   folic acid 1 mg tablet; Commonly known as: Folvite   furosemide 20 mg tablet; Commonly known as: Lasix; Take 1 tablet (20 mg)   by mouth every other day.   loratadine 10 mg tablet; Commonly known as: Claritin; Take 1 tablet (10   mg) by mouth once daily as needed for allergies.   metoprolol tartrate 25 mg tablet; Commonly known as: Lopressor; Take 1   tablet (25 mg) by mouth 2 times a day.   multivitamin tablet   naloxone 4 mg/0.1 mL nasal spray; Commonly known as: Narcan; Administer   1 spray (4 mg) into affected nostril(s) if needed for opioid reversal. May   repeat every 2-3 minutes if needed, alternating nostrils, until medical   assistance becomes available.   ondansetron 4 mg tablet; Commonly known as: Zofran; Take 1 tablet (4 mg)   by mouth every 8 hours if needed for nausea or vomiting.   oxyCODONE 10 mg immediate release tablet; Commonly known as: Roxicodone;   Take 1 tablet (10 mg) by mouth every 4 hours if needed for severe pain (7   - 10) (pain) for up to 12 days.   oxygen gas therapy; Commonly known as: O2; Inhale 1 each continuously.   warfarin 5 mg tablet; Commonly known as: Coumadin; Take as directed. If   you are unsure how to take this medication, talk to your nurse or doctor.;   Original instructions: Take 1 tablet (5 mg) by mouth once daily in the   evening.     STOP taking these medications     Chloraseptic Throat Spray 1.4 % aerosol,spray; Generic drug: phenoL   white petrolatum 41 % ointment; Commonly known as: Aquaphor       Outpatient Follow-Up  Future Appointments   Date Time Provider Department Center   8/6/2025  8:30 AM John Biggs MD [id number] [facility]   8/25/2025 11:20 AM Ankur White DO [id number] [facility]   9/2/2025  2:00 PM INF 07 [facility]       Shala Cr MD

## 2025-08-05 NOTE — CARE PLAN
The patient's goals for the shift include      The clinical goals for the shift include Pt will have decreased pain through end of shift 8/5/2025 0700      Problem: Pain - Adult  Goal: Verbalizes/displays adequate comfort level or baseline comfort level  Outcome: Progressing     Problem: Safety - Adult  Goal: Free from fall injury  Outcome: Progressing     Problem: Discharge Planning  Goal: Discharge to home or other facility with appropriate resources  Outcome: Progressing     Problem: Chronic Conditions and Co-morbidities  Goal: Patient's chronic conditions and co-morbidity symptoms are monitored and maintained or improved  Outcome: Progressing     Problem: Nutrition  Goal: Nutrient intake appropriate for maintaining nutritional needs  Outcome: Progressing     Problem: Pain  Goal: Takes deep breaths with improved pain control throughout the shift  Outcome: Progressing  Goal: Turns in bed with improved pain control throughout the shift  Outcome: Progressing  Goal: Walks with improved pain control throughout the shift  Outcome: Progressing  Goal: Performs ADL's with improved pain control throughout shift  Outcome: Progressing  Goal: Participates in PT with improved pain control throughout the shift  Outcome: Progressing  Goal: Free from opioid side effects throughout the shift  Outcome: Progressing  Goal: Free from acute confusion related to pain meds throughout the shift  Outcome: Progressing     Problem: Fall/Injury  Goal: Not fall by end of shift  Outcome: Progressing  Goal: Be free from injury by end of the shift  Outcome: Progressing  Goal: Verbalize understanding of personal risk factors for fall in the hospital  Outcome: Progressing  Goal: Verbalize understanding of risk factor reduction measures to prevent injury from fall in the home  Outcome: Progressing  Goal: Use assistive devices by end of the shift  Outcome: Progressing  Goal: Pace activities to prevent fatigue by end of the shift  Outcome:  Progressing

## 2025-08-05 NOTE — PROGRESS NOTES
SICKLE CELL OUTPATIENT NOTE  Patient ID: Senait Narvaez   Visit Type: Follow up visit     ASSESSMENT AND PLAN  Senait Narvaez is 55 y.o. female with history of hemoglobin SC sickle cell disease, presenting with severe acute on chronic sickle cell pain, not well-controlled with her home oral regimen.  She has a history of cardiac arrhythmia and has had increased episodes over the last couple of hours.  She is on chronic red blood cell exchange transfusion, and is scheduled for this later this week.    Plan  - Discussed patient with provider in emergency department and patient sent to the ED for further evaluation and treatment of pain, as well as arrhythmia  - We will make arrangements for full exchange transfusion, if patient is admitted to hospital.  - No planned changes in the interim in her current home oral pain regimen  - Continue anticoagulation with warfarin    Chief Complaint: Increased sickle cell pain, not controlled with his current      Interval History: Senait is present with her mother, in clinic today, and complains of severe acute sickle cell pain, which is generalized and has not been controlled with her home oral pain regimen.  She has had multiple episodes of SVT symptoms this morning and would like to be seen in the ED.  She feels dizzy, and denies any headaches, nausea or vomiting.  She denies chest pain, or chest tightness but does have some palpitations.  Swelling of her lower extremities is still present and thinks it is getting worse. She is afebrile, and denies cough.  She has not had any focal neurologic deficits.       Review of System:   Review of Systems   Constitutional:  Positive for fatigue. Negative for fever.   HENT:   Negative for nosebleeds.    Eyes:  Negative for icterus.   Respiratory:  Positive for shortness of breath. Negative for chest tightness, cough, hemoptysis and wheezing.    Cardiovascular:  Negative for chest pain, leg swelling and palpitations.   Gastrointestinal:  Positive  for constipation. Negative for abdominal pain, blood in stool, diarrhea, nausea and vomiting.   Genitourinary:  Negative for hematuria.    Musculoskeletal:  Positive for arthralgias, back pain and myalgias. Negative for flank pain and gait problem.   Skin:  Negative for wound.   Neurological:  Negative for extremity weakness, gait problem, headaches, light-headedness and numbness.   Hematological:  Does not bruise/bleed easily.   Psychiatric/Behavioral:  Negative for depression. The patient is not nervous/anxious.       Allergies:   Allergies   Allergen Reactions    Vancomycin Rash    Oxycontin [Oxycodone] Drowsiness     Current Medications:           Medication Order Taking? Sig Documenting Provider Last Dose Status   albuterol 90 mcg/actuation inhaler [id number] Yes Inhale 2 puffs every 6 hours if needed for wheezing. Erich Whaley MD More than a month Active   ascorbic acid (Vitamin C) 500 mg chewable tablet [id number] Yes Chew 1 tablet (500 mg) once daily. Historical Provider, MD 6/16/2025 Morning Active   atropine 1 % ophthalmic solution [id number] Yes Administer 1 drop into the left eye once daily at bedtime. Please continue to administer to Left eye until seen at outpt clinic with ophthalmology this upcoming week TOMAS De 6/16/2025 Bedtime Active   benzocaine-menthol (Cepastat Sore Throat) lozenge [id number] Yes Dissolve 1 lozenge in the mouth every 2 hours if needed for sore throat. GRISEL De-CNP Past Week Active   cyclobenzaprine (Flexeril) 10 mg tablet [id number] No Take 1 tablet (10 mg) by mouth 3 times a day as needed for muscle spasms. Erich Whaley MD 6/14/2025 Active   folic acid (Folvite) 1 mg tablet [id number] Yes Take 1 tablet (1 mg) by mouth once daily. Historical Provider, MD 6/16/2025 Morning Active   furosemide (Lasix) 20 mg tablet [id number] No Take 1 tablet (20 mg) by mouth every other day. Ankur White DO 6/15/2025 Active   loratadine (Claritin) 10 mg  tablet [id number] No Take 1 tablet (10 mg) by mouth once daily as needed for allergies. Historical Provider, MD 6/14/2025 Active   metoprolol tartrate (Lopressor) 25 mg tablet [id number] Yes Take 1 tablet (25 mg) by mouth 2 times a day. Ankur White,  6/16/2025 Evening Active   multivitamin tablet [id number] Yes Take 1 tablet by mouth once daily. Historical Provider, MD 6/16/2025 Active   naloxone (Narcan) 4 mg/0.1 mL nasal spray [id number] No Administer 1 spray (4 mg) into affected nostril(s) if needed for opioid reversal. May repeat every 2-3 minutes if needed, alternating nostrils, until medical assistance becomes available.   Patient not taking: Reported on 6/17/2025    Erich Whaley MD Not Taking Active   ondansetron (Zofran) 4 mg tablet [id number] Yes Take 1 tablet (4 mg) by mouth every 8 hours if needed for nausea or vomiting. GRISEL Mendez-CNP Past Month Active   oxyCODONE (Roxicodone) 10 mg immediate release tablet [id number] Yes Take 1 tablet (10 mg) by mouth every 4 hours if needed for severe pain (7 - 10) (pain). GRISEL Mendez-CNP 6/15/2025 Active   oxygen (O2) gas therapy [id number] No Inhale 1 each continuously. GRISEL Contreras-CNP Unknown Active   phenoL (Chloraseptic) 1.4 % aerosol,spray [id number] No Use 1 spray in the mouth or throat every 2 hours if needed for sore throat.   Patient not taking: Reported on 6/17/2025    GRISEL De-CNP Not Taking Active   warfarin (Coumadin) 5 mg tablet [id number] Yes Take 1 tablet (5 mg) by mouth once daily in the evening. Erich Whaley MD 6/16/2025 Active   white petrolatum (Aquaphor) 41 % ointment ointment [id number] No Apply 2 Applications topically 2 times a day. Apply vaseline to all eroded/denuded areas. 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   Obstructive Sleep and significant nocturnal hemoglobin desaturation    Bilateral lower extremity edema, recurrent  SVC syndrome S/P balloon angioplasty of SVC/left brachiocephalic vein, removal of left sided Mediport catheter (1/21/20)  Syncopal episodes   Balloon removed via IR on April 2023.   SVT event with conversion with 1 dose 6mg of adenosine on 5/5/23  Admission June 2023 underwent RHC/LHC on 6/16 with mPA pressure 36, wedge 14, CI 4.2 and her coronary angiogram revealed no angiographic evidence of obstructive CAD. Dx of pulm HTN.     Family History:  Family History   Problem Relation    Diabetes Father    Gout Father    Sickle cell trait Father    Seizures Sister    Diabetes Other    Uterine cancer Other    Other (congenital blindness) Cousin     Social History:   Senait Narvaez  reports that she quit smoking about 11 years ago. Her smoking use included cigarettes. She has been exposed to tobacco smoke. She has never used smokeless tobacco.  reports that she does not currently use drugs.    EXAMINATION FINDINGS   /57 (BP Location: Left arm, Patient Position: Sitting, BP Cuff Size: Large adult)   Pulse 93   Temp 36 °C (96.8 °F) (Skin)   Resp 14   Wt 99.2 kg (218 lb 11.2 oz)   SpO2 (!) 83%   BMI 36.39 kg/m²   2.13 meters squared    Physical Exam  Vitals and nursing note reviewed.   Constitutional:       General: She is in acute distress.      Appearance: She is ill-appearing. She is not toxic-appearing.   Neck:      Comments: SVC syndrome with distension of her neck  veins  Cardiovascular:      Rate and Rhythm: Regular rhythm. Tachycardia present.      Pulses: Normal pulses.      Heart sounds: Murmur heard.   Pulmonary:      Effort: Pulmonary effort is normal. No respiratory distress.      Breath sounds: Normal breath sounds. No wheezing.   Abdominal:      General: Bowel sounds are normal.      Palpations: Abdomen is soft.      Tenderness: There is no abdominal tenderness. There is no guarding.     Musculoskeletal:         General: No swelling or deformity.      Cervical back: Normal range of motion and neck supple.      Right lower leg: Edema present.      Left lower leg: Edema present.     Skin:     General: Skin is warm.      Capillary Refill: Capillary refill takes less than 2 seconds.      Findings: No lesion or rash.     Neurological:      General: No focal deficit present.      Mental Status: She is alert and oriented to person, place, and time. Mental status is at baseline.      Cranial Nerves: No cranial nerve deficit.       LABS        Erich Whaley MD

## 2025-08-05 NOTE — NURSING NOTE
Patient discharged home. Patient received discharge papers and did not have any questions. Patient instructed to  meds at pharmacy and confirmed with patient. patient was HDS and VSS at time of discharge. Peripheral IV was removed and all belongings sent with patient.

## 2025-08-06 ENCOUNTER — APPOINTMENT (OUTPATIENT)
Dept: OPHTHALMOLOGY | Facility: CLINIC | Age: 55
End: 2025-08-06
Payer: COMMERCIAL

## 2025-08-08 LAB
POC INR: 2.8 (ref 0.9–1.1)
POC PROTHROMBIN TIME: ABNORMAL (ref 9.3–12.5)

## 2025-08-08 NOTE — PROGRESS NOTES
Patient identification verified with 2 identifiers.    Location: [facility] Patient Self-Testing Program [phone]     Referring Physician: Erich Whaley MD   Enrollment/ Re-enrollment date: 2026  INR Goal: 2.0-3.0  INR monitoring is per [facility] protocol.  Anticoagulation Medication: warfarin  Indication: Deep Vein Thrombosis (DVT)    Subjective   Bleeding signs/symptoms: No    Bruising: No   Major bleeding event: No  Thrombosis signs/symptoms: No  Thromboembolic event: No  Missed doses: No  Extra doses: No  Medication changes: No  Dietary changes: No  Change in health: No  Change in activity: No  Alcohol: No  Other concerns: No    Upcoming Procedures:  Does the Patient Have any upcoming procedures that require interruption in anticoagulation therapy? no  Does the patient require bridging? no      Anticoagulation Summary  As of 2025      INR goal:  2.0-3.0   TTR:  82.1% (1.2 y)   INR used for dosin.80 (2025)   Weekly warfarin total:  35 mg               Assessment/Plan   Therapeutic     1. New dose: Spoke to patient with dosing instructions and next testing date.  Patient just came home from rehab today.  Patient reports no changes in medications.    2. Next INR: 2 weeks      Education provided to patient during the visit:  Patient instructed to call in interim with questions, concerns and changes.

## 2025-08-13 ENCOUNTER — TELEPHONE (OUTPATIENT)
Dept: HEMATOLOGY/ONCOLOGY | Facility: HOSPITAL | Age: 55
End: 2025-08-13
Payer: COMMERCIAL

## 2025-08-13 DIAGNOSIS — D57.00 SICKLE CELL CRISIS (MULTI): ICD-10-CM

## 2025-08-13 RX ORDER — OXYCODONE HYDROCHLORIDE 10 MG/1
10 TABLET ORAL EVERY 4 HOURS PRN
Qty: 75 TABLET | Refills: 0 | Status: SHIPPED | OUTPATIENT
Start: 2025-08-13 | End: 2025-08-25

## 2025-08-13 RX ORDER — ONDANSETRON 4 MG/1
4 TABLET, FILM COATED ORAL EVERY 8 HOURS PRN
Qty: 20 TABLET | Refills: 1 | Status: SHIPPED | OUTPATIENT
Start: 2025-08-13

## 2025-08-20 ENCOUNTER — APPOINTMENT (OUTPATIENT)
Dept: RADIOLOGY | Facility: HOSPITAL | Age: 55
End: 2025-08-20
Payer: COMMERCIAL

## 2025-08-20 DIAGNOSIS — Z12.31 SCREENING MAMMOGRAM FOR BREAST CANCER: ICD-10-CM

## 2025-08-21 DIAGNOSIS — D57.219 SICKLE-CELL-HEMOGLOBIN C DISEASE WITH CRISIS (MULTI): Primary | ICD-10-CM

## 2025-08-21 DIAGNOSIS — Z92.89 HISTORY OF EXCHANGE TRANSFUSION: ICD-10-CM

## 2025-08-25 ENCOUNTER — ANTICOAGULATION - WARFARIN VISIT (OUTPATIENT)
Dept: CARDIOLOGY | Facility: CLINIC | Age: 55
End: 2025-08-25
Payer: COMMERCIAL

## 2025-08-25 ENCOUNTER — OFFICE VISIT (OUTPATIENT)
Dept: CARDIOLOGY | Facility: CLINIC | Age: 55
End: 2025-08-25
Payer: COMMERCIAL

## 2025-08-25 VITALS
WEIGHT: 210 LBS | RESPIRATION RATE: 18 BRPM | DIASTOLIC BLOOD PRESSURE: 62 MMHG | HEIGHT: 65 IN | OXYGEN SATURATION: 98 % | HEART RATE: 83 BPM | SYSTOLIC BLOOD PRESSURE: 90 MMHG | BODY MASS INDEX: 34.99 KG/M2

## 2025-08-25 DIAGNOSIS — I50.9 CONGESTIVE HEART FAILURE, UNSPECIFIED HF CHRONICITY, UNSPECIFIED HEART FAILURE TYPE: ICD-10-CM

## 2025-08-25 DIAGNOSIS — I11.9 HYPERTENSIVE HEART DISEASE WITHOUT HEART FAILURE: ICD-10-CM

## 2025-08-25 DIAGNOSIS — I25.5 CARDIOMYOPATHY, ISCHEMIC: Primary | ICD-10-CM

## 2025-08-25 DIAGNOSIS — I82.4Z9 DEEP VEIN THROMBOSIS (DVT) OF DISTAL VEIN OF LOWER EXTREMITY, UNSPECIFIED CHRONICITY, UNSPECIFIED LATERALITY: ICD-10-CM

## 2025-08-25 DIAGNOSIS — I47.10 PAROXYSMAL SUPRAVENTRICULAR TACHYCARDIA: ICD-10-CM

## 2025-08-25 DIAGNOSIS — I26.99 RECURRENT PULMONARY EMBOLISM (MULTI): Primary | ICD-10-CM

## 2025-08-25 DIAGNOSIS — I82.210 THROMBOSIS OF SUPERIOR VENA CAVA (MULTI): ICD-10-CM

## 2025-08-25 LAB
ATRIAL RATE: 83 BPM
BODY SURFACE AREA: 2.1 M2
INR IN PPP BY COAGULATION ASSAY EXTERNAL: 2.6
P AXIS: 69 DEGREES
P OFFSET: 181 MS
P ONSET: 128 MS
PR INTERVAL: 170 MS
PROTHROMBIN TIME (PT) IN PPP BY COAGULATION ASSAY EXTERNAL: NORMAL
Q ONSET: 213 MS
QRS COUNT: 13 BEATS
QRS DURATION: 78 MS
QT INTERVAL: 362 MS
QTC CALCULATION(BAZETT): 425 MS
QTC FREDERICIA: 403 MS
R AXIS: 136 DEGREES
T AXIS: 101 DEGREES
T OFFSET: 394 MS
VENTRICULAR RATE: 83 BPM

## 2025-08-25 PROCEDURE — 93005 ELECTROCARDIOGRAM TRACING: CPT | Performed by: INTERNAL MEDICINE

## 2025-08-25 PROCEDURE — 93248 EXT ECG>7D<15D REV&INTERPJ: CPT | Performed by: INTERNAL MEDICINE

## 2025-08-25 PROCEDURE — 99212 OFFICE O/P EST SF 10 MIN: CPT

## 2025-08-25 RX ORDER — METOPROLOL TARTRATE 25 MG/1
25 TABLET, FILM COATED ORAL 3 TIMES DAILY
Qty: 270 TABLET | Refills: 3 | Status: SHIPPED | OUTPATIENT
Start: 2025-08-25 | End: 2026-08-25

## 2025-08-25 ASSESSMENT — ENCOUNTER SYMPTOMS
DEPRESSION: 0
OCCASIONAL FEELINGS OF UNSTEADINESS: 0
LOSS OF SENSATION IN FEET: 0

## 2025-08-25 ASSESSMENT — PAIN SCALES - GENERAL: PAINLEVEL_OUTOF10: 0-NO PAIN

## 2025-09-02 ENCOUNTER — APPOINTMENT (OUTPATIENT)
Dept: HEMATOLOGY/ONCOLOGY | Facility: HOSPITAL | Age: 55
End: 2025-09-02
Payer: COMMERCIAL

## 2025-09-02 ENCOUNTER — TELEPHONE (OUTPATIENT)
Dept: ADMISSION | Facility: HOSPITAL | Age: 55
End: 2025-09-02
Payer: COMMERCIAL

## 2025-09-02 DIAGNOSIS — D57.00 SICKLE CELL DISEASE WITH CRISIS (MULTI): Primary | ICD-10-CM

## 2025-09-02 RX ORDER — FOLIC ACID 1 MG/1
1 TABLET ORAL DAILY
Qty: 90 TABLET | Refills: 3 | Status: SHIPPED | OUTPATIENT
Start: 2025-09-02

## 2025-09-02 RX ORDER — OXYCODONE HYDROCHLORIDE 10 MG/1
10 TABLET ORAL EVERY 4 HOURS PRN
Qty: 75 TABLET | Refills: 0 | Status: SHIPPED | OUTPATIENT
Start: 2025-09-02

## 2025-09-02 RX ORDER — OXYCODONE HYDROCHLORIDE 10 MG/1
10 TABLET ORAL EVERY 4 HOURS PRN
COMMUNITY
End: 2025-09-02 | Stop reason: SDUPTHER

## 2025-09-03 ENCOUNTER — ANTICOAGULATION - WARFARIN VISIT (OUTPATIENT)
Dept: CARDIOLOGY | Facility: CLINIC | Age: 55
End: 2025-09-03
Payer: COMMERCIAL

## 2025-09-03 DIAGNOSIS — I82.4Z9 DEEP VEIN THROMBOSIS (DVT) OF DISTAL VEIN OF LOWER EXTREMITY, UNSPECIFIED CHRONICITY, UNSPECIFIED LATERALITY: ICD-10-CM

## 2025-09-03 DIAGNOSIS — I26.99 RECURRENT PULMONARY EMBOLISM (MULTI): Primary | ICD-10-CM

## 2025-09-03 DIAGNOSIS — I82.210 THROMBOSIS OF SUPERIOR VENA CAVA (MULTI): ICD-10-CM

## 2025-09-03 LAB
INR IN PPP BY COAGULATION ASSAY EXTERNAL: 2.4
PROTHROMBIN TIME (PT) IN PPP BY COAGULATION ASSAY EXTERNAL: NORMAL

## (undated) PROCEDURE — 02HV33Z INSERTION OF INFUSION DEVICE INTO SUPERIOR VENA CAVA, PERCUTANEOUS APPROACH: ICD-10-PCS

## (undated) PROCEDURE — 08QF3ZZ REPAIR LEFT RETINA, PERCUTANEOUS APPROACH: ICD-10-PCS